# Patient Record
Sex: FEMALE | Race: WHITE | Employment: UNEMPLOYED | URBAN - METROPOLITAN AREA
[De-identification: names, ages, dates, MRNs, and addresses within clinical notes are randomized per-mention and may not be internally consistent; named-entity substitution may affect disease eponyms.]

---

## 2020-08-10 ENCOUNTER — TELEPHONE (OUTPATIENT)
Dept: ONCOLOGY | Age: 55
End: 2020-08-10

## 2020-08-11 ENCOUNTER — TELEPHONE (OUTPATIENT)
Dept: ONCOLOGY | Age: 55
End: 2020-08-11

## 2020-08-11 NOTE — TELEPHONE ENCOUNTER
Name: Mohsen Lock [facility]  : 1965  MRN: [id number]    Oncology Navigation- Initial Note:    Intake-  Contact Type: Telephone  Notes: Alexandra Hartmann [facility] , alerted writer received pt's records,, verified medical insurance, & scheduled Dr. Soy Ramos consult 2020 @ 0830. Spoke with pt updated on appt details & requested pt bring photo id/insurance card/medication list.  Pt verbalized understanding. Discussed potential barriers to care, pt denied any. Discussed community resources, offered referral to [facility], pt agreeable. Secure e-mail sent to [facility], notified of referral.  [facility] financial assistance application completed with pt, e-mailed to [employer]. [facility] written materials, [facility] post card, [facility] brochure, & writer's business card mailed to pt. Will continue to follow.     Electronically signed by Angelica Porter RN on 2020 at 8:41 AM

## 2020-08-14 ENCOUNTER — INITIAL CONSULT (OUTPATIENT)
Dept: ONCOLOGY | Age: 55
End: 2020-08-14
Payer: COMMERCIAL

## 2020-08-14 ENCOUNTER — TELEPHONE (OUTPATIENT)
Dept: ONCOLOGY | Age: 55
End: 2020-08-14

## 2020-08-14 VITALS
SYSTOLIC BLOOD PRESSURE: 139 MMHG | WEIGHT: 187.9 LBS | HEIGHT: 65 IN | HEART RATE: 89 BPM | DIASTOLIC BLOOD PRESSURE: 74 MMHG | TEMPERATURE: 97.7 F | BODY MASS INDEX: 31.31 KG/M2

## 2020-08-14 PROCEDURE — 99245 OFF/OP CONSLTJ NEW/EST HI 55: CPT | Performed by: INTERNAL MEDICINE

## 2020-08-14 PROCEDURE — 99201 HC NEW PT, E/M LEVEL 1: CPT | Performed by: INTERNAL MEDICINE

## 2020-08-14 RX ORDER — METFORMIN HYDROCHLORIDE 500 MG/1
1000 TABLET, EXTENDED RELEASE ORAL 2 TIMES DAILY
COMMUNITY
End: 2020-10-05 | Stop reason: SDUPTHER

## 2020-08-14 RX ORDER — NICOTINE POLACRILEX 4 MG/1
20 GUM, CHEWING ORAL NIGHTLY
COMMUNITY
End: 2020-10-05 | Stop reason: CLARIF

## 2020-08-14 RX ORDER — SERTRALINE HYDROCHLORIDE 100 MG/1
50 TABLET, FILM COATED ORAL DAILY
COMMUNITY
Start: 2020-08-08 | End: 2021-02-05 | Stop reason: SDUPTHER

## 2020-08-14 RX ORDER — ASPIRIN 81 MG/1
81 TABLET, CHEWABLE ORAL NIGHTLY
Status: ON HOLD | COMMUNITY
End: 2021-11-01 | Stop reason: HOSPADM

## 2020-08-14 RX ORDER — SIMVASTATIN 40 MG
40 TABLET ORAL NIGHTLY
COMMUNITY
End: 2021-01-27 | Stop reason: ALTCHOICE

## 2020-08-14 RX ORDER — HYDROCODONE BITARTRATE AND ACETAMINOPHEN 5; 325 MG/1; MG/1
1 TABLET ORAL EVERY 6 HOURS PRN
Qty: 56 TABLET | Refills: 0 | Status: SHIPPED | OUTPATIENT
Start: 2020-08-14 | End: 2020-08-28

## 2020-08-14 NOTE — PROGRESS NOTES
_           Ms. Rogelio Cedeño is a very pleasant 60 y.o. female with history of multiple co morbidities as listed. The patient seen in consultation for ovarian cancer with multiple recurrences. 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 She had surgical debulking and she had systemic treatment with Taxol and carboplatin for 6 cycles. Patient was in remission for about 2 years. She had a relapse in 2007 and treated with topotecan with good results. Patient relapsed again in 2008 and was treated with Taxol carboplatin. After 3 cycles of systemic chemotherapy she had problems with carboplatin so she finished 3 more cycles with Taxol. She did well again for 2 to 3 years until she had a relapse in 2012 and she had intraperitoneal chemotherapy treatment with Taxol. Patient was last seen by her oncologist in 2014 at which time she had relatively stable disease with normal tumor marker and no significant abnormal images. Patient moved to [place] after that and she was lost for oncology follow-ups. Patient was recently evaluated again here in [address] because of abdominal discomfort. Re imaging confirmed metastatic relapse. Biopsy confirmed ovarian cancer recurrence. Scan showed extensive intraabdominal and splenic involvement and lung mets. She has no symptoms at the present time. Patient denies smoking or alcohol drinking.l      PAST MEDICAL HISTORY: has a past medical history of Anemia, Cervical cancer ([facility] [address] Depression, Diabetes mellitus ([address] and GERD (gastroesophageal reflux disease). PAST SURGICAL HISTORY: has a past surgical history that includes Hysterectomy, total abdominal; Port Surgery; and Tonsillectomy.      CURRENT MEDICATIONS:  has a current medication list which includes the following prescription(s): metformin, simvastatin, sertraline, Vital signs: Blood pressure 139/74, pulse 89, temperature 97.7 °F (36.5 °C), temperature source Oral, height 5' 5\" (1.651 m), weight 187 lb 14.4 oz (85.2 kg). General appearance - well appearing, not in pain or distress  Mental status - good mood, alert and oriented  Eyes - pupils equal and reactive, extraocular eye movements intact  Ears - bilateral TM's and external ear canals normal  Nose - normal and patent, no erythema, discharge or polyps  Mouth - mucous membranes moist, pharynx normal without lesions  Neck - supple, no significant adenopathy  Lymphatics - no palpable lymphadenopathy, no hepatosplenomegaly  Chest - clear to auscultation, no wheezes, rales or rhonchi, symmetric air entry  Heart - normal rate, regular rhythm, normal S1, S2, no murmurs, rubs, clicks or gallops  Abdomen - soft, nontender, nondistended, no masses or organomegaly  Neurological - alert, oriented, normal speech, no focal findings or movement disorder noted  Musculoskeletal - no joint tenderness, deformity or swelling  Extremities - peripheral pulses normal, no pedal edema, no clubbing or cyanosis  Skin - normal coloration and turgor, no rashes, no suspicious skin lesions noted     Review of Diagnostic data:   No results found for: WBC, HGB, HCT, MCV, PLT    Chemistry    No results found for: NA, K, CL, CO2, BUN, CREATININE No results found for: CALCIUM, ALKPHOS, AST, ALT, BILITOT         IMPRESSION:   Recurrent ovarian cancer. Original diagnosis 2005 with multiple recurrences. Diffuse metastasis. PLAN: records from outside facilities were reviewed and discussed. For more than 60 minutes of face to face discussion, I explained to the patient the nature of ovarian cancer, staging, prognosis and treatment options. She had multiple recurrences over the last 15 years. We will do PET/CT scan staging evaluation. Discussed palliative treatment. Different regimens were discussed.   We will check tumor for microsatellite instability and will do genetic testing for BRCA1, 2. In the interim we will start treatment with Doxil. Benefits and side effects explained. We will do an Echo. Mediport by IR. Discussed diagnoses, medications, treatment, alternatives, potential side effects with the patient who indicated an understanding.

## 2020-08-14 NOTE — LETTER
[address] [facility] [address]  Phone: [phone]  Fax: [phone]    Alecia Felder MD        August 14, 2020     Patient: Jose Juan Hnuter [facility]   YOB: 1965   Date of Visit: 8/14/2020       To Whom It May Concern:    Ms Marisa Encinas was evaluated in my office and she can go back to work with no restrictions. If you have any questions or concerns, please don't hesitate to call.     Sincerely,        Alecia Felder MD

## 2020-08-14 NOTE — TELEPHONE ENCOUNTER
gia by IR-IR to contact pt for appt-pt informed, understood. Pet/CT scan-scheduled at [facility] on 8/20/2020, arrive at 1030.   Records from Dr Addis To, [facility] with [facility] (medical rec #[phone]), she stated the records will be faxed to [facility] within 24-48 hours once med release is obtained-release form faxed to [phone], confirmation rec'd  Further recommendations will follow.

## 2020-08-14 NOTE — PATIENT INSTRUCTIONS
mediport by IR  Pet/CT scan  Records from Dr Nicol Harp, [facility]   Further recommendations will follow

## 2020-08-17 PROBLEM — C56.9 MALIGNANT NEOPLASM OF OVARY (HCC): Status: ACTIVE | Noted: 2020-08-17

## 2020-08-20 ENCOUNTER — HOSPITAL ENCOUNTER (OUTPATIENT)
Dept: NUCLEAR MEDICINE | Age: 55
Discharge: HOME | End: 2020-08-22
Payer: COMMERCIAL

## 2020-08-20 VITALS — WEIGHT: 187 LBS | BODY MASS INDEX: 31.16 KG/M2 | HEIGHT: 65 IN

## 2020-08-20 LAB — GLUCOSE BLD-MCNC: 139 MG/DL (ref 65–105)

## 2020-08-20 PROCEDURE — 82947 ASSAY GLUCOSE BLOOD QUANT: CPT

## 2020-08-20 PROCEDURE — A9552 F18 FDG: HCPCS | Performed by: INTERNAL MEDICINE

## 2020-08-20 PROCEDURE — 78815 PET IMAGE W/CT SKULL-THIGH: CPT

## 2020-08-20 PROCEDURE — 3430000000 HC RX DIAGNOSTIC RADIOPHARMACEUTICAL: Performed by: INTERNAL MEDICINE

## 2020-08-20 PROCEDURE — 2580000003 HC RX 258: Performed by: INTERNAL MEDICINE

## 2020-08-20 RX ORDER — SODIUM CHLORIDE 0.9 % (FLUSH) 0.9 %
10 SYRINGE (ML) INJECTION PRN
Status: DISCONTINUED | OUTPATIENT
Start: 2020-08-20 | End: 2020-08-23 | Stop reason: HOSPADM

## 2020-08-20 RX ORDER — FLUDEOXYGLUCOSE F 18 200 MCI/ML
10 INJECTION, SOLUTION INTRAVENOUS
Status: COMPLETED | OUTPATIENT
Start: 2020-08-20 | End: 2020-08-20

## 2020-08-20 RX ADMIN — FLUDEOXYGLUCOSE F 18 11.49 MILLICURIE: 200 INJECTION, SOLUTION INTRAVENOUS at 10:58

## 2020-08-20 RX ADMIN — Medication 10 ML: at 10:58

## 2020-08-21 ENCOUNTER — PREP FOR PROCEDURE (OUTPATIENT)
Dept: GENERAL RADIOLOGY | Age: 55
End: 2020-08-21

## 2020-08-21 ENCOUNTER — TELEPHONE (OUTPATIENT)
Dept: ONCOLOGY | Age: 55
End: 2020-08-21

## 2020-08-21 ENCOUNTER — HOSPITAL ENCOUNTER (OUTPATIENT)
Dept: PREADMISSION TESTING | Age: 55
Setting detail: SPECIMEN
Discharge: HOME | End: 2020-08-25
Payer: COMMERCIAL

## 2020-08-21 PROBLEM — Z51.11 ENCOUNTER FOR ANTINEOPLASTIC CHEMOTHERAPY: Status: ACTIVE | Noted: 2020-08-21

## 2020-08-21 PROCEDURE — U0003 INFECTIOUS AGENT DETECTION BY NUCLEIC ACID (DNA OR RNA); SEVERE ACUTE RESPIRATORY SYNDROME CORONAVIRUS 2 (SARS-COV-2) (CORONAVIRUS DISEASE [COVID-19]), AMPLIFIED PROBE TECHNIQUE, MAKING USE OF HIGH THROUGHPUT TECHNOLOGIES AS DESCRIBED BY CMS-2020-01-R: HCPCS

## 2020-08-21 RX ORDER — SODIUM CHLORIDE 0.9 % (FLUSH) 0.9 %
10 SYRINGE (ML) INJECTION PRN
Status: CANCELLED | OUTPATIENT
Start: 2020-09-18

## 2020-08-21 RX ORDER — DIPHENHYDRAMINE HYDROCHLORIDE 50 MG/ML
50 INJECTION INTRAMUSCULAR; INTRAVENOUS ONCE
Status: CANCELLED | OUTPATIENT
Start: 2020-09-18

## 2020-08-21 RX ORDER — SODIUM CHLORIDE 0.9 % (FLUSH) 0.9 %
5 SYRINGE (ML) INJECTION PRN
Status: CANCELLED | OUTPATIENT
Start: 2020-09-18

## 2020-08-21 RX ORDER — SODIUM CHLORIDE 9 MG/ML
INJECTION, SOLUTION INTRAVENOUS CONTINUOUS
Status: CANCELLED | OUTPATIENT
Start: 2020-09-18

## 2020-08-21 RX ORDER — HEPARIN SODIUM (PORCINE) LOCK FLUSH IV SOLN 100 UNIT/ML 100 UNIT/ML
500 SOLUTION INTRAVENOUS PRN
Status: CANCELLED | OUTPATIENT
Start: 2020-09-18

## 2020-08-21 RX ORDER — METHYLPREDNISOLONE SODIUM SUCCINATE 125 MG/2ML
125 INJECTION, POWDER, LYOPHILIZED, FOR SOLUTION INTRAMUSCULAR; INTRAVENOUS ONCE
Status: CANCELLED | OUTPATIENT
Start: 2020-09-18

## 2020-08-21 RX ORDER — SODIUM CHLORIDE 9 MG/ML
INJECTION, SOLUTION INTRAVENOUS CONTINUOUS
Status: CANCELLED | OUTPATIENT
Start: 2020-08-21

## 2020-08-21 RX ORDER — DEXTROSE MONOHYDRATE 50 MG/ML
INJECTION, SOLUTION INTRAVENOUS ONCE
Status: CANCELLED | OUTPATIENT
Start: 2020-09-18

## 2020-08-21 RX ORDER — EPINEPHRINE 1 MG/ML
0.3 INJECTION, SOLUTION, CONCENTRATE INTRAVENOUS PRN
Status: CANCELLED | OUTPATIENT
Start: 2020-09-18

## 2020-08-21 NOTE — TELEPHONE ENCOUNTER
Name: Elvin Devries [facility]  : 1965  MRN: [id number]    Oncology Navigation Follow-Up Note    Contact Type:  Telephone  Notes: 2020 PET/CT scan results obtained, Dr. Yaquelin Pires updated on results. Dr. Yaquelin Pires stated will place orders for 2D echo, doxil, & genetic referral.  Dr. Yaquelin Pires stated already spoke with Malika Monae, [facility] genetic counselor, & alerted to pt's case. Dr. Yaquelin Pires requested writer update pt & may notify PET/CT scan results showing known cancer with two areas in spine with questionable uptake, no further testing r/t pt asymptomatic. Spoke with pt, updated on PET/CT scan results & tx plan. Instructed pt schedulers will call to coordinate 2D echo & once chemotherapy PA approved will schedule Dr. Yaquelin Pires f/u with C1 doxil. Instructed pt will receive call from Malika Monae to coordinate genetic eval/testing. Pt verbalized understanding & thanked writer. Inquired on [address] referrals, pt stated started receiving assistance from both community resources. Instructed pt may contact writer prn. Will continue to follow.     Electronically signed by Tom Vanegas RN on 2020 at 3:12 PM

## 2020-08-22 LAB — SARS-COV-2, NAA: NOT DETECTED

## 2020-08-24 ENCOUNTER — HOSPITAL ENCOUNTER (OUTPATIENT)
Dept: INTERVENTIONAL RADIOLOGY/VASCULAR | Age: 55
Discharge: HOME | End: 2020-08-26
Payer: COMMERCIAL

## 2020-08-24 VITALS
BODY MASS INDEX: 31.16 KG/M2 | HEIGHT: 65 IN | DIASTOLIC BLOOD PRESSURE: 73 MMHG | OXYGEN SATURATION: 97 % | SYSTOLIC BLOOD PRESSURE: 129 MMHG | WEIGHT: 187 LBS | RESPIRATION RATE: 18 BRPM | HEART RATE: 94 BPM | TEMPERATURE: 98.6 F

## 2020-08-24 LAB
INR BLD: 0.9
PARTIAL THROMBOPLASTIN TIME: 23.1 SEC (ref 20.5–30.5)
PLATELET # BLD: 550 K/UL (ref 138–453)
PROTHROMBIN TIME: 9.9 SEC (ref 9–12)

## 2020-08-24 PROCEDURE — 85049 AUTOMATED PLATELET COUNT: CPT

## 2020-08-24 PROCEDURE — 2709999900 HC NON-CHARGEABLE SUPPLY

## 2020-08-24 PROCEDURE — 6360000002 HC RX W HCPCS: Performed by: PHYSICIAN ASSISTANT

## 2020-08-24 PROCEDURE — 85730 THROMBOPLASTIN TIME PARTIAL: CPT

## 2020-08-24 PROCEDURE — 85610 PROTHROMBIN TIME: CPT

## 2020-08-24 PROCEDURE — 6360000002 HC RX W HCPCS: Performed by: RADIOLOGY

## 2020-08-24 PROCEDURE — 2580000003 HC RX 258: Performed by: PHYSICIAN ASSISTANT

## 2020-08-24 PROCEDURE — C1894 INTRO/SHEATH, NON-LASER: HCPCS

## 2020-08-24 PROCEDURE — 36582 REPLACE TUNNELED CV CATH: CPT | Performed by: RADIOLOGY

## 2020-08-24 PROCEDURE — 77001 FLUOROGUIDE FOR VEIN DEVICE: CPT | Performed by: RADIOLOGY

## 2020-08-24 PROCEDURE — 7100000010 HC PHASE II RECOVERY - FIRST 15 MIN

## 2020-08-24 PROCEDURE — C1769 GUIDE WIRE: HCPCS

## 2020-08-24 PROCEDURE — 7100000011 HC PHASE II RECOVERY - ADDTL 15 MIN

## 2020-08-24 RX ORDER — ACETAMINOPHEN 325 MG/1
650 TABLET ORAL EVERY 4 HOURS PRN
Status: DISCONTINUED | OUTPATIENT
Start: 2020-08-24 | End: 2020-08-27 | Stop reason: HOSPADM

## 2020-08-24 RX ORDER — FENTANYL CITRATE 50 UG/ML
INJECTION, SOLUTION INTRAMUSCULAR; INTRAVENOUS
Status: COMPLETED | OUTPATIENT
Start: 2020-08-24 | End: 2020-08-24

## 2020-08-24 RX ORDER — MIDAZOLAM HYDROCHLORIDE 1 MG/ML
INJECTION INTRAMUSCULAR; INTRAVENOUS
Status: COMPLETED | OUTPATIENT
Start: 2020-08-24 | End: 2020-08-24

## 2020-08-24 RX ORDER — SODIUM CHLORIDE 9 MG/ML
INJECTION, SOLUTION INTRAVENOUS CONTINUOUS
Status: DISCONTINUED | OUTPATIENT
Start: 2020-08-24 | End: 2020-08-27 | Stop reason: HOSPADM

## 2020-08-24 RX ORDER — HEPARIN SODIUM (PORCINE) LOCK FLUSH IV SOLN 100 UNIT/ML 100 UNIT/ML
SOLUTION INTRAVENOUS
Status: COMPLETED | OUTPATIENT
Start: 2020-08-24 | End: 2020-08-24

## 2020-08-24 RX ORDER — CEFAZOLIN SODIUM 1 G/50ML
1 INJECTION, SOLUTION INTRAVENOUS ONCE
Status: COMPLETED | OUTPATIENT
Start: 2020-08-24 | End: 2020-08-24

## 2020-08-24 RX ORDER — CEFAZOLIN SODIUM 1 G/50ML
1 INJECTION, SOLUTION INTRAVENOUS
Status: COMPLETED | OUTPATIENT
Start: 2020-08-24 | End: 2020-08-24

## 2020-08-24 RX ADMIN — FENTANYL CITRATE 50 MCG: 50 INJECTION INTRAMUSCULAR; INTRAVENOUS at 14:48

## 2020-08-24 RX ADMIN — SODIUM CHLORIDE, PRESERVATIVE FREE 5 ML: 5 INJECTION INTRAVENOUS at 14:12

## 2020-08-24 RX ADMIN — FENTANYL CITRATE 25 MCG: 50 INJECTION INTRAMUSCULAR; INTRAVENOUS at 14:22

## 2020-08-24 RX ADMIN — MIDAZOLAM HYDROCHLORIDE 0.5 MG: 1 INJECTION, SOLUTION INTRAMUSCULAR; INTRAVENOUS at 13:56

## 2020-08-24 RX ADMIN — FENTANYL CITRATE 50 MCG: 50 INJECTION INTRAMUSCULAR; INTRAVENOUS at 13:35

## 2020-08-24 RX ADMIN — CEFAZOLIN SODIUM 1 G: 1 INJECTION, SOLUTION INTRAVENOUS at 12:38

## 2020-08-24 RX ADMIN — CEFAZOLIN SODIUM 1 G: 1 INJECTION, SOLUTION INTRAVENOUS at 14:04

## 2020-08-24 RX ADMIN — MIDAZOLAM HYDROCHLORIDE 0.5 MG: 1 INJECTION, SOLUTION INTRAMUSCULAR; INTRAVENOUS at 14:23

## 2020-08-24 RX ADMIN — SODIUM CHLORIDE: 9 INJECTION, SOLUTION INTRAVENOUS at 12:38

## 2020-08-24 RX ADMIN — FENTANYL CITRATE 25 MCG: 50 INJECTION INTRAMUSCULAR; INTRAVENOUS at 13:56

## 2020-08-24 ASSESSMENT — PAIN SCALES - GENERAL
PAINLEVEL_OUTOF10: 0

## 2020-08-24 ASSESSMENT — PAIN - FUNCTIONAL ASSESSMENT: PAIN_FUNCTIONAL_ASSESSMENT: 0-10

## 2020-08-24 NOTE — PLAN OF CARE
Right chest port with skin glue noted. Pulses positive, skin pink , warm, and dry. No hematoma or drainage noted.

## 2020-08-24 NOTE — H&P
mellitus ([facility] [address] and GERD (gastroesophageal reflux disease). Past Surgical History   has a past surgical history that includes Hysterectomy, total abdominal; Port Surgery; and Tonsillectomy. Medications   Scheduled Meds:   ceFAZolin  1 g Intravenous On Call to OR     Current Outpatient Rx   Medication Sig Dispense Refill    aspirin 81 MG chewable tablet Take 81 mg by mouth nightly      omeprazole 20 MG EC tablet Take 20 mg by mouth nightly      metFORMIN (GLUCOPHAGE-XR) 500 MG extended release tablet Take 1,000 mg by mouth 2 times daily      simvastatin (ZOCOR) 40 MG tablet Take 40 mg by mouth nightly      sertraline (ZOLOFT) 100 MG tablet Take 50 mg by mouth daily       HYDROcodone-acetaminophen (NORCO) 5-325 MG per tablet Take 1 tablet by mouth every 6 hours as needed for Pain for up to 14 days. 56 tablet 0     Continuous Infusions:   sodium chloride       PRN Meds:. Allergies  has No Known Allergies. Family History    family history is not on file.     Family Status   Relation Name Status    Mother      Father           Social History  Social History     Socioeconomic History    Marital status: Single     Spouse name: Not on file    Number of children: Not on file    Years of education: Not on file    Highest education level: Not on file   Occupational History    Not on file   Social Needs    Financial resource strain: Not on file    Food insecurity     Worry: Not on file     Inability: Not on file    Transportation needs     Medical: Not on file     Non-medical: Not on file   Tobacco Use    Smoking status: Current Every Day Smoker     Packs/day: 1.00    Smokeless tobacco: Current User   Substance and Sexual Activity    Alcohol use: Not Currently    Drug use: Never    Sexual activity: Not on file   Lifestyle    Physical activity     Days per week: Not on file     Minutes per session: Not on file    Stress: Not on file   Relationships    Social connections Talks on phone: Not on file     Gets together: Not on file     Attends Tenriism service: Not on file     Active member of club or organization: Not on file     Attends meetings of clubs or organizations: Not on file     Relationship status: Not on file    Intimate partner violence     Fear of current or ex partner: Not on file     Emotionally abused: Not on file     Physically abused: Not on file     Forced sexual activity: Not on file   Other Topics Concern    Not on file   Social History Narrative    Not on file                 Hobbies:  Watch TV ,     OBJECTIVE:   VITALS:  vitals were not taken for this visit. CONSTITUTIONAL: Alert and oriented times 3, no acute distress and cooperative to examination. friendly and pleasant     SKIN: rash No    HEENT: Head is normocephalic, atraumatic. EOMI, PERRLA    Oral air way :slightly narrow Yes    NECK: neck supple, no lymphadenopathy noted, trachea midline and straight       2+ carotid, no bruit    LUNGS: Chest expands equally bilaterally upon respiration, no accessory muscle used. Ausculation reveals no adventitious breath sounds. CARDIOVASCULAR: \"Heart sounds are normal.  Regular rate and rhythm without murmur,    ABDOMEN: Bowel sounds are present in all four quadrants  , over weight     GENATALIA:Deferred. NEUROLOGIC: \"CN II-XII are grossly intact. EXTREMITIES: Pitting edema:  No,  Varicose veins: Yes     Dorsal pedal/posterior tibial pulses palpable: Yes         Strength:    Not tested      Patient Active Problem List   Diagnosis    Malignant neoplasm of ovary ([address]    Encounter for antineoplastic chemotherapy               IMPRESSIONS:   1.  ovarian cancer with multiple recurrences  2. does not have any pertinent problems on file.     [facility]  Electronically signed 8/24/2020 at 12:24 PM       Scheduled for:   Port placement    For chemo

## 2020-08-24 NOTE — BRIEF OP NOTE
Brief Postoperative Note for [facility]  YOB: 1965  [id number]    Pre-operative Diagnosis: Ovarian ca      Post-operative Diagnosis: Same    Procedure: Annika Ket 8 Fr Port Placement    Medication Given: fentanyl and midazolam    Anesthesia: 1 %Lidocaine, 1% Lidocaine w/ Epi    Surgeons/Assistants: Mirian Osman MD    Estimated Blood Loss: Minimal    Complications:retained cath fragment. Patient declined attempt for removal at this time. Previous port removed. New Xcela 8 Fr Port placement into the right IJ. Port flushed with heparin. Incision site closed with Vicryl sutures. Dressing applied. Vital signs were reviewed and were stable after the procedure. Patient tolerated the procedure well.     Electronically signed by Dev Gomez PA-C on 8/24/2020 at 3:05 PM

## 2020-08-24 NOTE — H&P (VIEW-ONLY)
History and Physical    Pt Name: Link Ponce  MRN: [id number]  YOB: 1965  Date of evaluation: 8/24/2020  Primary Care Physician: No primary care provider on file. Patient evaluated at the request of  Dr. Ritchie Lambert    Reason for evaluation:  ovarian cancer with multiple recurrences  SUBJECTIVE:   History of Chief Complaint:   According to oncology note in Aug/2020    Brent Santos is a 60 y.o. female,       Ms. Madhav Fagan is a very pleasant 60 y.o. female with history of multiple co morbidities as listed. The patient seen in consultation for ovarian cancer with multiple recurrences. 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 She had surgical debulking and she had systemic treatment with Taxol and carboplatin for 6 cycles. Patient was in remission for about 2 years. She had a relapse in 2007 and treated with topotecan with good results. Patient relapsed again in 2008 and was treated with Taxol carboplatin. After 3 cycles of systemic chemotherapy she had problems with carboplatin so she finished 3 more cycles with Taxol. She did well again for 2 to 3 years until she had a relapse in 2012 and she had intraperitoneal chemotherapy treatment with Taxol. Patient was last seen by her oncologist in 2014 at which time she had relatively stable disease with normal tumor marker and no significant abnormal images. Patient moved to [place] after that and she was lost for oncology follow-ups. Patient was recently evaluated again here in [facility] because of abdominal discomfort. Re imaging confirmed metastatic relapse. Biopsy confirmed ovarian cancer recurrence. Scan showed extensive intraabdominal and splenic involvement and lung mets. She has no symptoms at the present time.    Patient denies smoking or alcohol drinking.l          Past Medical History      has a past medical history of Anemia, Cervical cancer ([address] Depression, Diabetes Talks on phone: Not on file     Gets together: Not on file     Attends Episcopal service: Not on file     Active member of club or organization: Not on file     Attends meetings of clubs or organizations: Not on file     Relationship status: Not on file    Intimate partner violence     Fear of current or ex partner: Not on file     Emotionally abused: Not on file     Physically abused: Not on file     Forced sexual activity: Not on file   Other Topics Concern    Not on file   Social History Narrative    Not on file                 Hobbies:  Watch TV ,     OBJECTIVE:   VITALS:  vitals were not taken for this visit. CONSTITUTIONAL: Alert and oriented times 3, no acute distress and cooperative to examination. friendly and pleasant     SKIN: rash No    HEENT: Head is normocephalic, atraumatic. EOMI, PERRLA    Oral air way :slightly narrow Yes    NECK: neck supple, no lymphadenopathy noted, trachea midline and straight       2+ carotid, no bruit    LUNGS: Chest expands equally bilaterally upon respiration, no accessory muscle used. Ausculation reveals no adventitious breath sounds. CARDIOVASCULAR: \"Heart sounds are normal.  Regular rate and rhythm without murmur,    ABDOMEN: Bowel sounds are present in all four quadrants  , over weight     GENATALIA:Deferred. NEUROLOGIC: \"CN II-XII are grossly intact. EXTREMITIES: Pitting edema:  No,  Varicose veins: Yes     Dorsal pedal/posterior tibial pulses palpable: Yes         Strength:    Not tested      Patient Active Problem List   Diagnosis    Malignant neoplasm of ovary ([address]    Encounter for antineoplastic chemotherapy               IMPRESSIONS:   1.  ovarian cancer with multiple recurrences  2. does not have any pertinent problems on file.     [facility]  Electronically signed 8/24/2020 at 12:24 PM       Scheduled for:   Port placement    For chemo

## 2020-08-24 NOTE — POST SEDATION
Sedation Post Procedure Note    Patient Name: Yaniv Fleming   YOB: 1965  Room/Bed: Room/bed info not found  Medical Record Number: [id number]  Date: 8/24/2020   Time: 3:05 PM         Physicians/Assistants: Brittney Lamar MD    Procedure Performed:  Port    Post-Sedation Vital Signs:  Vitals:    08/24/20 1451   BP: (!) 141/65   Pulse: 74   Resp: 15   SpO2: 95%      Vital signs were reviewed and were stable after the procedure (see flow sheet for vitals)            Post-Sedation Exam: Pt remains stable           Complications: none    Electronically signed by JOANN Jones on 8/24/2020 at 3:05 PM

## 2020-08-24 NOTE — PRE SEDATION
Sedation Pre-Procedure Note    Patient Name: Jerry Gomez [facility]   YOB: 1965  Room/Bed: Room/bed info not found  Medical Record Number: [id number]  Date: 8/24/2020   Time: 1:28 PM       Indication:  Port    Consent: I have discussed with the patient and/or the patient representative the indication, alternatives, and the possible risks and/or complications of the planned procedure and the anesthesia methods. The patient and/or patient representative appear to understand and agree to proceed. Vital Signs:   Vitals:    08/24/20 1225   BP: 115/62   Pulse: 79   Resp: 18   SpO2: 96%       Past Medical History:   has a past medical history of Anemia, Cervical cancer ([facility] [address] Depression, Diabetes mellitus ([facility] [address] and GERD (gastroesophageal reflux disease). Past Surgical History:   has a past surgical history that includes Hysterectomy, total abdominal; Port Surgery; and Tonsillectomy. Medications:   Scheduled Meds:   Continuous Infusions:    sodium chloride 20 mL/hr at 08/24/20 1238     PRN Meds:   Home Meds:   Prior to Admission medications    Medication Sig Start Date End Date Taking? Authorizing Provider   aspirin 81 MG chewable tablet Take 81 mg by mouth nightly   Yes Historical Provider, MD   metFORMIN (GLUCOPHAGE-XR) 500 MG extended release tablet Take 1,000 mg by mouth 2 times daily   Yes Historical Provider, MD   simvastatin (ZOCOR) 40 MG tablet Take 40 mg by mouth nightly   Yes Historical Provider, MD   sertraline (ZOLOFT) 100 MG tablet Take 50 mg by mouth daily  8/8/20  Yes Historical Provider, MD   HYDROcodone-acetaminophen (NORCO) 5-325 MG per tablet Take 1 tablet by mouth every 6 hours as needed for Pain for up to 14 days.  8/14/20 8/28/20 Yes Nataly Hughes MD   omeprazole 20 MG EC tablet Take 20 mg by mouth nightly    Historical Provider, MD     Coumadin Use Last 7 Days:  no  Antiplatelet drug therapy use last 7 days: no  Other anticoagulant use last 7 days: no  Additional Medication Information:  See [facility]      Pre-Sedation Documentation and Exam:   I have personally completed a history, physical exam & review of systems for this patient (see notes).     Mallampati Airway Assessment:  Mallampati Class II - (soft palate, fauces & uvula are visible)    Prior History of Anesthesia Complications:   none    ASA Classification:  Class 2 - A normal healthy patient with mild systemic disease    Sedation/ Anesthesia Plan:   intravenous sedation    Medications Planned:   midazolam (Versed) intravenously and fentanyl intravenously    Patient is an appropriate candidate for plan of sedation: yes    Electronically signed by JOANN Bell on 8/24/2020 at 1:28 PM

## 2020-08-25 ENCOUNTER — TELEPHONE (OUTPATIENT)
Dept: ONCOLOGY | Age: 55
End: 2020-08-25

## 2020-08-25 NOTE — TELEPHONE ENCOUNTER
Name: Michael Song [facility]  : 1965  MRN: [id number]    Oncology Navigation Follow-Up Note    Contact Type:  Telephone  Notes: Jerry Sánchez, [facility] IR , alerted writer pt completed port removal & new port insertion yesterday. Jerry Sánchez stated piece of old port in vein, pt declined to stay for removal, scheduled 2020 for removal, & Dr. Biswas Citizen spoke with Dr. Nahun Forrest yesterday. Will continue to follow.     Electronically signed by Brady Nugent RN on 2020 at 1:36 PM

## 2020-08-28 ENCOUNTER — HOSPITAL ENCOUNTER (OUTPATIENT)
Dept: INTERVENTIONAL RADIOLOGY/VASCULAR | Age: 55
Discharge: HOME | End: 2020-08-30
Payer: COMMERCIAL

## 2020-08-28 VITALS
HEART RATE: 78 BPM | DIASTOLIC BLOOD PRESSURE: 62 MMHG | WEIGHT: 187 LBS | BODY MASS INDEX: 31.16 KG/M2 | TEMPERATURE: 97.6 F | SYSTOLIC BLOOD PRESSURE: 123 MMHG | OXYGEN SATURATION: 96 % | RESPIRATION RATE: 14 BRPM | HEIGHT: 65 IN

## 2020-08-28 PROCEDURE — C1894 INTRO/SHEATH, NON-LASER: HCPCS

## 2020-08-28 PROCEDURE — 76937 US GUIDE VASCULAR ACCESS: CPT

## 2020-08-28 PROCEDURE — 7100000010 HC PHASE II RECOVERY - FIRST 15 MIN

## 2020-08-28 PROCEDURE — 7100000011 HC PHASE II RECOVERY - ADDTL 15 MIN

## 2020-08-28 PROCEDURE — 6360000002 HC RX W HCPCS: Performed by: RADIOLOGY

## 2020-08-28 PROCEDURE — 36012 PLACE CATHETER IN VEIN: CPT | Performed by: RADIOLOGY

## 2020-08-28 PROCEDURE — C1773 RET DEV, INSERTABLE: HCPCS

## 2020-08-28 PROCEDURE — C1725 CATH, TRANSLUMIN NON-LASER: HCPCS

## 2020-08-28 PROCEDURE — 75827 VEIN X-RAY CHEST: CPT | Performed by: RADIOLOGY

## 2020-08-28 PROCEDURE — C1769 GUIDE WIRE: HCPCS

## 2020-08-28 PROCEDURE — 2709999900 HC NON-CHARGEABLE SUPPLY

## 2020-08-28 PROCEDURE — 37197 REMOVE INTRVAS FOREIGN BODY: CPT | Performed by: RADIOLOGY

## 2020-08-28 RX ORDER — FENTANYL CITRATE 50 UG/ML
INJECTION, SOLUTION INTRAMUSCULAR; INTRAVENOUS
Status: COMPLETED | OUTPATIENT
Start: 2020-08-28 | End: 2020-08-28

## 2020-08-28 RX ORDER — SODIUM CHLORIDE 9 MG/ML
INJECTION, SOLUTION INTRAVENOUS CONTINUOUS
Status: DISCONTINUED | OUTPATIENT
Start: 2020-08-28 | End: 2020-08-31 | Stop reason: HOSPADM

## 2020-08-28 RX ORDER — ACETAMINOPHEN 325 MG/1
650 TABLET ORAL EVERY 4 HOURS PRN
Status: DISCONTINUED | OUTPATIENT
Start: 2020-08-28 | End: 2020-08-31 | Stop reason: HOSPADM

## 2020-08-28 RX ADMIN — FENTANYL CITRATE 50 MCG: 50 INJECTION INTRAMUSCULAR; INTRAVENOUS at 08:50

## 2020-08-28 ASSESSMENT — PAIN SCALES - GENERAL
PAINLEVEL_OUTOF10: 3
PAINLEVEL_OUTOF10: 3
PAINLEVEL_OUTOF10: 6
PAINLEVEL_OUTOF10: 3

## 2020-08-28 ASSESSMENT — PAIN DESCRIPTION - LOCATION
LOCATION: BACK

## 2020-08-28 ASSESSMENT — PAIN DESCRIPTION - PAIN TYPE
TYPE: ACUTE PAIN
TYPE: CHRONIC PAIN
TYPE: CHRONIC PAIN

## 2020-08-28 ASSESSMENT — PAIN - FUNCTIONAL ASSESSMENT: PAIN_FUNCTIONAL_ASSESSMENT: 0-10

## 2020-08-28 NOTE — PROGRESS NOTES
Dime size amount red blood noted to right groin dressing; no hematoma or discoloration noted; right dp and pt pulse palpable.

## 2020-08-28 NOTE — PRE SEDATION
Sedation Pre-Procedure Note    Patient Name: Richard Monzon [facility]   YOB: 1965  Room/Bed: Room/bed info not found  Medical Record Number: [id number]  Date: 8/28/2020   Time: 9:12 AM       Indication:  Foreign body retrieval     Consent: I have discussed with the patient and/or the patient representative the indication, alternatives, and the possible risks and/or complications of the planned procedure and the anesthesia methods. The patient and/or patient representative appear to understand and agree to proceed. Vital Signs:   Vitals:    08/28/20 0905   BP: 139/83   Pulse: 73   Resp: 16   Temp:    SpO2: 99%       Past Medical History:   has a past medical history of Anemia, Cervical cancer ([facility] [address] Depression, Diabetes mellitus ([facility] [address] and GERD (gastroesophageal reflux disease). Past Surgical History:   has a past surgical history that includes Hysterectomy, total abdominal; Port Surgery; Tonsillectomy; and IR PORT PLACEMENT > 5 YEARS (8/24/2020). Medications:   Scheduled Meds:   Continuous Infusions:   PRN Meds:   Home Meds:   Prior to Admission medications    Medication Sig Start Date End Date Taking? Authorizing Provider   omeprazole 20 MG EC tablet Take 20 mg by mouth nightly   Yes Historical Provider, MD   metFORMIN (GLUCOPHAGE-XR) 500 MG extended release tablet Take 1,000 mg by mouth 2 times daily   Yes Historical Provider, MD   simvastatin (ZOCOR) 40 MG tablet Take 40 mg by mouth nightly   Yes Historical Provider, MD   sertraline (ZOLOFT) 100 MG tablet Take 50 mg by mouth daily  8/8/20  Yes Historical Provider, MD   HYDROcodone-acetaminophen (NORCO) 5-325 MG per tablet Take 1 tablet by mouth every 6 hours as needed for Pain for up to 14 days.  8/14/20 8/28/20 Yes Corrinne Mulders, MD   aspirin 81 MG chewable tablet Take 81 mg by mouth nightly    Historical Provider, MD     Coumadin Use Last 7 Days:  no  Antiplatelet drug therapy use last 7 days: no  Other anticoagulant use last 7 days: no  Additional Medication Information:  See [facility] rec      Pre-Sedation Documentation and Exam:   I have personally completed a history, physical exam & review of systems for this patient (see notes).     Mallampati Airway Assessment:  Mallampati Class II - (soft palate, fauces & uvula are visible)    Prior History of Anesthesia Complications:   none    ASA Classification:  Class 2 - A normal healthy patient with mild systemic disease    Sedation/ Anesthesia Plan:   intravenous sedation    Medications Planned:   midazolam (Versed) intravenously and fentanyl intravenously    Patient is an appropriate candidate for plan of sedation: yes    Electronically signed by JOANN Cortez on 8/28/2020 at 9:12 AM

## 2020-08-28 NOTE — BRIEF OP NOTE
Brief Postoperative Note    [facility]  YOB: 1965  [id number]    Pre-operative Diagnosis: Foreign body right IJ    Post-operative Diagnosis: Same    Procedure: Foreign body retrieval     Anesthesia: Local and Moderate Sedation    Surgeons/Assistants: Sue Tamayo MD    Estimated Blood Loss: less than 50     Complications: None    Specimens: Was Obtained:     Findings: Successful removal of right IJ foreign body. Approximately a 9 cm piece of catheter was removed in entirety from the right IJ. Pt tolerated well.     Electronically signed by JOANN Carlton on 8/28/2020 at 9:13 AM

## 2020-08-28 NOTE — POST SEDATION
Sedation Post Procedure Note    Patient Name: Maryana Guzman   YOB: 1965  Room/Bed: Room/bed info not found  Medical Record Number: [id number]  Date: 8/28/2020   Time: 9:13 AM         Physicians/Assistants:  Radha Beatty MD    Procedure Performed:  Foreign body retrieval     Post-Sedation Vital Signs:  Vitals:    08/28/20 0905   BP: 139/83   Pulse: 73   Resp: 16   Temp:    SpO2: 99%      Vital signs were reviewed and were stable after the procedure (see flow sheet for vitals)            Post-Sedation Exam: Pt remains stable           Complications: none    Electronically signed by JOANN Montgomery on 8/28/2020 at 9:13 AM

## 2020-08-31 ENCOUNTER — HOSPITAL ENCOUNTER (OUTPATIENT)
Dept: NON INVASIVE DIAGNOSTICS | Age: 55
Discharge: HOME | End: 2020-09-02
Payer: COMMERCIAL

## 2020-08-31 LAB
LV EF: 63 %
LVEF MODALITY: NORMAL

## 2020-08-31 PROCEDURE — 93306 TTE W/DOPPLER COMPLETE: CPT

## 2020-09-01 ENCOUNTER — TELEPHONE (OUTPATIENT)
Dept: ONCOLOGY | Age: 55
End: 2020-09-01

## 2020-09-01 NOTE — TELEPHONE ENCOUNTER
Name: Elvin Devries [facility]  : 1965  MRN: [id number]    Oncology Navigation Follow-Up Note    Contact Type:  Telephone  Notes: Upon review of Epic noted pt completed port exchange & 2D echo. Noted chemo PA pending. Archetypes message sent to [facility] , updated on pt, inquired on pending PA, & requested pre cert contacted. Will continue to follow.       Electronically signed by Tom Vanegas RN on 2020 at 8:21 AM

## 2020-09-03 ENCOUNTER — TELEPHONE (OUTPATIENT)
Dept: ONCOLOGY | Age: 55
End: 2020-09-03

## 2020-09-03 NOTE — TELEPHONE ENCOUNTER
Name: Charlie Moya [facility]  : 1965  MRN: [id number]    Oncology Navigation Follow-Up Note    Contact Type:  Telephone  Notes: Pt left VM inquiring on chemotherapy schedule. Spoke with Imer Brown, [facility] infusion , updated on VM & inquired on chemotherapy PA. Imer Brown stated PA pending & will contact [facility] pre cert now. Spoke with pt updated on conversation with Imer Brown. Will continue to follow.     Electronically signed by Luly Barriga RN on 9/3/2020 at 10:38 AM

## 2020-09-09 ENCOUNTER — TELEPHONE (OUTPATIENT)
Dept: ONCOLOGY | Age: 55
End: 2020-09-09

## 2020-09-09 RX ORDER — HEPARIN SODIUM (PORCINE) LOCK FLUSH IV SOLN 100 UNIT/ML 100 UNIT/ML
500 SOLUTION INTRAVENOUS PRN
Status: CANCELLED | OUTPATIENT
Start: 2020-11-13

## 2020-09-09 RX ORDER — DIPHENHYDRAMINE HYDROCHLORIDE 50 MG/ML
50 INJECTION INTRAMUSCULAR; INTRAVENOUS ONCE
Status: CANCELLED | OUTPATIENT
Start: 2020-11-13

## 2020-09-09 RX ORDER — SODIUM CHLORIDE 0.9 % (FLUSH) 0.9 %
5 SYRINGE (ML) INJECTION PRN
Status: CANCELLED | OUTPATIENT
Start: 2020-11-13

## 2020-09-09 RX ORDER — DEXTROSE MONOHYDRATE 50 MG/ML
INJECTION, SOLUTION INTRAVENOUS ONCE
Status: CANCELLED | OUTPATIENT
Start: 2020-11-13

## 2020-09-09 RX ORDER — EPINEPHRINE 1 MG/ML
0.3 INJECTION, SOLUTION, CONCENTRATE INTRAVENOUS PRN
Status: CANCELLED | OUTPATIENT
Start: 2020-11-13

## 2020-09-09 RX ORDER — SODIUM CHLORIDE 9 MG/ML
INJECTION, SOLUTION INTRAVENOUS CONTINUOUS
Status: CANCELLED | OUTPATIENT
Start: 2020-11-13

## 2020-09-09 RX ORDER — SODIUM CHLORIDE 0.9 % (FLUSH) 0.9 %
10 SYRINGE (ML) INJECTION PRN
Status: CANCELLED | OUTPATIENT
Start: 2020-11-13

## 2020-09-09 RX ORDER — METHYLPREDNISOLONE SODIUM SUCCINATE 125 MG/2ML
125 INJECTION, POWDER, LYOPHILIZED, FOR SOLUTION INTRAMUSCULAR; INTRAVENOUS ONCE
Status: CANCELLED | OUTPATIENT
Start: 2020-11-13

## 2020-09-09 NOTE — TELEPHONE ENCOUNTER
Fax received from Sreekanth Carmona, pre-cert, stating patient's Doxil is denied and requires using in combination with Avastin in members who haven't previously received Avastin. Writer informed Dr Arabella Choi of above to see if he wanted to perform an expedited appeal. Dr Arabella Choi stated he will place orders for Avastin with Doxil. Writer informed Sreekanth Carmona, pre-cert, of above and Sreekanth Carmona is to resubmit orders once Avastin is added.  Austyn Lloyd

## 2020-09-10 ENCOUNTER — TELEPHONE (OUTPATIENT)
Dept: ONCOLOGY | Age: 55
End: 2020-09-10

## 2020-09-10 NOTE — TELEPHONE ENCOUNTER
Name: Hortensia Sanchez [facility]  : 1965  MRN: [id number]    Oncology Navigation Follow-Up Note    Contact Type:  Telephone  Notes: Pt left  inquiring on chemotherapy start. Upon review of Epic chemotherapy PA denied r/t requires using doxil in combination w/Avastin, Dr. Shanda Molina updated on denial, may appeal, or may add avastin. Per documentation Dr. Shanda Molina stated will place orders for avastin. Pt updated on findings. Pt verbalized frustrations with insurance company. Support given & instructed pt writer will follow closely. Pt verbalized understanding & thanked writer. Will continue to follow.     Electronically signed by Jazlyn Medina RN on 9/10/2020 at 10:49 AM

## 2020-09-11 ENCOUNTER — TELEPHONE (OUTPATIENT)
Dept: ONCOLOGY | Age: 55
End: 2020-09-11

## 2020-09-14 ENCOUNTER — TELEPHONE (OUTPATIENT)
Dept: ONCOLOGY | Age: 55
End: 2020-09-14

## 2020-09-15 ENCOUNTER — TELEPHONE (OUTPATIENT)
Dept: ONCOLOGY | Age: 55
End: 2020-09-15

## 2020-09-15 NOTE — TELEPHONE ENCOUNTER
Zayda Leslie, pre-cert, stating tx is still pending and stated she will give us an update in the morning. Writer instructed Zayda Leslie to assure tx is marked as URGENT. Shay Florentino, nurse navigator, informed of above.  Dexter Baez

## 2020-09-15 NOTE — TELEPHONE ENCOUNTER
Name: Michaela Ramirez [facility]  : 1965  MRN: [id number]    Oncology Navigation Follow-Up Note    Contact Type:  Telephone  Notes: Spoke with pt notified chemotherapy PA pending, more information submitted to insurance company urgently, & hopeful for decision by end of day. Pt verbalized understanding. Will continue to follow.     Electronically signed by Dandre Munoz RN on 9/15/2020 at 3:17 PM

## 2020-09-16 ENCOUNTER — TELEPHONE (OUTPATIENT)
Dept: INFUSION THERAPY | Age: 55
End: 2020-09-16

## 2020-09-16 ENCOUNTER — TELEPHONE (OUTPATIENT)
Dept: ONCOLOGY | Age: 55
End: 2020-09-16

## 2020-09-16 NOTE — TELEPHONE ENCOUNTER
Late entry. At the end of the day on 9/15/20. Call received from Allen GRADY, stating that Doxil and Avastin need a peer review and stated to call [phone] to perform an expedited appeal with physician reviewer with episode of coverage ID [id number]. YSABEL also stated that updated information, with fax cover sheet including patient's name, , address, [employer] ID, Dr Arvizu Eastern name, NPI, both drugs along with dosages/method and duration of administration, copy of front and back of insurance card and diagnosis codes. Above information faxed to [phone] and marked URGENT. Confirmation fax received and information placed in pile to be scanned.  Aníbal Domingo

## 2020-09-16 NOTE — TELEPHONE ENCOUNTER
Chemotherapy orders received:    Ht=65 inches  Gn=597 lbs  BSA=1.97  Chemotherapy doses verified:  Avastin 15mg/kg = 1260 mg  Doxil 40 mg/m2 = 78 mg  Papi Rosas RN

## 2020-09-16 NOTE — TELEPHONE ENCOUNTER
Name: Tanisha Menendez [facility]  : 1965  MRN: [id number]    Oncology Navigation Follow-Up Note    Contact Type:  Telephone  Notes: Shari [address] [place] [address] triage nurse, alerted writer chemotherapy PA approved. Spoke with pt updated chemotherapy approved & will receive call from [facility] , with appt times for chemotherapy along with Dr. Loretta Julio f/u on 2020. Instructed pt may contact writer prn. Vostu message sent to [place] updated writer notified chemotherapy PA approved & conversation with pt. Will continue to follow.     Electronically signed by Tracy Deluca RN on 2020 at 8:46 AM

## 2020-09-16 NOTE — TELEPHONE ENCOUNTER
Chemo orders received, ht 65\", wt 187lbs, and bsa 1.97 verified. Dose of chemotherapy verified;   Avastin 15mg/kg= 1272mg  Doxil 40mg/m2= 78mg

## 2020-09-16 NOTE — TELEPHONE ENCOUNTER
Sarah Carbone, pre-cert, sent skype message stating's patient's chemo tx is approved. Writer informed Renee Corbin, nurse navigator, of above. [facility] to call patient to inform her of above and Alivia Tai, , to call patient to schedule f/u appt with Dr Zee Kenney this Friday and C1 tx following appt this Friday. Writer informed [facility], infusion charge RN, of above.  Vel Crenshaw

## 2020-09-18 ENCOUNTER — TELEPHONE (OUTPATIENT)
Dept: ONCOLOGY | Age: 55
End: 2020-09-18

## 2020-09-18 ENCOUNTER — HOSPITAL ENCOUNTER (OUTPATIENT)
Dept: INFUSION THERAPY | Age: 55
Discharge: HOME | End: 2020-09-18
Payer: COMMERCIAL

## 2020-09-18 ENCOUNTER — OFFICE VISIT (OUTPATIENT)
Dept: ONCOLOGY | Age: 55
End: 2020-09-18
Payer: COMMERCIAL

## 2020-09-18 VITALS
SYSTOLIC BLOOD PRESSURE: 103 MMHG | TEMPERATURE: 99 F | HEART RATE: 112 BPM | BODY MASS INDEX: 30.85 KG/M2 | WEIGHT: 185.4 LBS | DIASTOLIC BLOOD PRESSURE: 68 MMHG | RESPIRATION RATE: 18 BRPM

## 2020-09-18 DIAGNOSIS — C56.9 MALIGNANT NEOPLASM OF OVARY, UNSPECIFIED LATERALITY (HCC): Primary | ICD-10-CM

## 2020-09-18 LAB
ABSOLUTE EOS #: 0.11 K/UL (ref 0–0.4)
ABSOLUTE IMMATURE GRANULOCYTE: ABNORMAL K/UL (ref 0–0.3)
ABSOLUTE LYMPH #: 0.97 K/UL (ref 1–4.8)
ABSOLUTE MONO #: 0.54 K/UL (ref 0.1–0.8)
ALBUMIN SERPL-MCNC: 4.3 G/DL (ref 3.5–5.2)
ALBUMIN/GLOBULIN RATIO: 1.4 (ref 1–2.5)
ALP BLD-CCNC: 83 U/L (ref 35–104)
ALT SERPL-CCNC: 6 U/L (ref 5–33)
ANION GAP SERPL CALCULATED.3IONS-SCNC: 17 MMOL/L (ref 9–17)
AST SERPL-CCNC: 9 U/L
BASOPHILS # BLD: 0 % (ref 0–2)
BASOPHILS ABSOLUTE: 0 K/UL (ref 0–0.2)
BILIRUB SERPL-MCNC: 0.16 MG/DL (ref 0.3–1.2)
BUN BLDV-MCNC: 11 MG/DL (ref 6–20)
BUN/CREAT BLD: ABNORMAL (ref 9–20)
CA 125: 295 U/ML
CALCIUM SERPL-MCNC: 9.6 MG/DL (ref 8.6–10.4)
CHLORIDE BLD-SCNC: 101 MMOL/L (ref 98–107)
CO2: 21 MMOL/L (ref 20–31)
CREAT SERPL-MCNC: 0.52 MG/DL (ref 0.5–0.9)
DIFFERENTIAL TYPE: ABNORMAL
EOSINOPHILS RELATIVE PERCENT: 1 % (ref 1–4)
GFR AFRICAN AMERICAN: >60 ML/MIN
GFR NON-AFRICAN AMERICAN: >60 ML/MIN
GFR SERPL CREATININE-BSD FRML MDRD: ABNORMAL ML/MIN/{1.73_M2}
GFR SERPL CREATININE-BSD FRML MDRD: ABNORMAL ML/MIN/{1.73_M2}
GLUCOSE BLD-MCNC: 179 MG/DL (ref 70–99)
HCT VFR BLD CALC: 28.8 % (ref 36–46)
HEMOGLOBIN: 9 G/DL (ref 12–16)
IMMATURE GRANULOCYTES: ABNORMAL %
LYMPHOCYTES # BLD: 9 % (ref 24–44)
MCH RBC QN AUTO: 24.4 PG (ref 26–34)
MCHC RBC AUTO-ENTMCNC: 31.2 G/DL (ref 31–37)
MCV RBC AUTO: 78.1 FL (ref 80–100)
MONOCYTES # BLD: 5 % (ref 1–7)
MORPHOLOGY: ABNORMAL
MORPHOLOGY: ABNORMAL
NRBC AUTOMATED: ABNORMAL PER 100 WBC
PDW BLD-RTO: 23.5 % (ref 12.5–15.4)
PLATELET # BLD: 591 K/UL (ref 140–450)
PLATELET ESTIMATE: ABNORMAL
PMV BLD AUTO: 6.7 FL (ref 6–12)
POTASSIUM SERPL-SCNC: 4.2 MMOL/L (ref 3.7–5.3)
PROTEIN UA: NEGATIVE
RBC # BLD: 3.69 M/UL (ref 4–5.2)
RBC # BLD: ABNORMAL 10*6/UL
SEG NEUTROPHILS: 85 % (ref 36–66)
SEGMENTED NEUTROPHILS ABSOLUTE COUNT: 9.18 K/UL (ref 1.8–7.7)
SODIUM BLD-SCNC: 139 MMOL/L (ref 135–144)
TOTAL PROTEIN: 7.3 G/DL (ref 6.4–8.3)
WBC # BLD: 10.8 K/UL (ref 3.5–11)
WBC # BLD: ABNORMAL 10*3/UL

## 2020-09-18 PROCEDURE — 36591 DRAW BLOOD OFF VENOUS DEVICE: CPT

## 2020-09-18 PROCEDURE — 99211 OFF/OP EST MAY X REQ PHY/QHP: CPT | Performed by: INTERNAL MEDICINE

## 2020-09-18 PROCEDURE — 96417 CHEMO IV INFUS EACH ADDL SEQ: CPT

## 2020-09-18 PROCEDURE — 85025 COMPLETE CBC W/AUTO DIFF WBC: CPT

## 2020-09-18 PROCEDURE — 99214 OFFICE O/P EST MOD 30 MIN: CPT | Performed by: INTERNAL MEDICINE

## 2020-09-18 PROCEDURE — 96415 CHEMO IV INFUSION ADDL HR: CPT

## 2020-09-18 PROCEDURE — 6360000002 HC RX W HCPCS: Performed by: INTERNAL MEDICINE

## 2020-09-18 PROCEDURE — 96375 TX/PRO/DX INJ NEW DRUG ADDON: CPT

## 2020-09-18 PROCEDURE — 86304 IMMUNOASSAY TUMOR CA 125: CPT

## 2020-09-18 PROCEDURE — 81003 URINALYSIS AUTO W/O SCOPE: CPT

## 2020-09-18 PROCEDURE — 2580000003 HC RX 258: Performed by: INTERNAL MEDICINE

## 2020-09-18 PROCEDURE — 96413 CHEMO IV INFUSION 1 HR: CPT

## 2020-09-18 PROCEDURE — 80053 COMPREHEN METABOLIC PANEL: CPT

## 2020-09-18 RX ORDER — SODIUM CHLORIDE 0.9 % (FLUSH) 0.9 %
10 SYRINGE (ML) INJECTION PRN
Status: DISCONTINUED | OUTPATIENT
Start: 2020-09-18 | End: 2020-09-19 | Stop reason: HOSPADM

## 2020-09-18 RX ORDER — HEPARIN SODIUM (PORCINE) LOCK FLUSH IV SOLN 100 UNIT/ML 100 UNIT/ML
500 SOLUTION INTRAVENOUS PRN
Status: DISCONTINUED | OUTPATIENT
Start: 2020-09-18 | End: 2020-09-19 | Stop reason: HOSPADM

## 2020-09-18 RX ORDER — DEXAMETHASONE SODIUM PHOSPHATE 10 MG/ML
8 INJECTION INTRAMUSCULAR; INTRAVENOUS ONCE
Status: COMPLETED | OUTPATIENT
Start: 2020-09-18 | End: 2020-09-18

## 2020-09-18 RX ORDER — DEXTROSE MONOHYDRATE 50 MG/ML
INJECTION, SOLUTION INTRAVENOUS ONCE
Status: COMPLETED | OUTPATIENT
Start: 2020-09-18 | End: 2020-09-18

## 2020-09-18 RX ORDER — FERROUS SULFATE 325(65) MG
325 TABLET ORAL
Qty: 60 TABLET | Refills: 5 | Status: SHIPPED | OUTPATIENT
Start: 2020-09-18 | End: 2021-01-27 | Stop reason: ALTCHOICE

## 2020-09-18 RX ADMIN — Medication 8 MG: at 10:11

## 2020-09-18 RX ADMIN — BEVACIZUMAB 1300 MG: 400 INJECTION, SOLUTION INTRAVENOUS at 10:55

## 2020-09-18 RX ADMIN — Medication 10 ML: at 13:43

## 2020-09-18 RX ADMIN — DOXORUBICIN HYDROCHLORIDE 78 MG: 2 INJECTABLE, LIPOSOMAL INTRAVENOUS at 12:30

## 2020-09-18 RX ADMIN — DEXTROSE MONOHYDRATE: 50 INJECTION, SOLUTION INTRAVENOUS at 10:10

## 2020-09-18 RX ADMIN — HEPARIN 500 UNITS: 100 SYRINGE at 13:43

## 2020-09-18 RX ADMIN — Medication 10 ML: at 10:10

## 2020-09-18 NOTE — FLOWSHEET NOTE
Situation:  Writer encountered Ms. Garcia and Son, Will, who is also an oncology patient, in the waiting area of the medical oncology clinic. Later, writer briefly visited with Patient and Son in the infusion clinic, upon learning from the nurse that this is Patient's first treatment. Assessment:  Ms. Clearnce Jeans and Son were in the waiting area of the medical oncology clinic. Son, whom writer has met, greeted writer and introduced Patient, who smiled and acknowledged writer. Son acknowledged the support of his mother and gave thanks. Later in the infusion clinic, Pt and Son were together in the treatment cubicle. They were eating lunch. Pt was looking at her lap top. She acknowledged writer and reported that she was doing well. She responded briefly to writer's questions and smiled. Son smiled but did not engage in conversation. Intervention:  Writer provided supportive presence; affirmed Pt and Son; introduced herself and informed Pt that she is available or support; offered words of encouragement and support. Outcome:  Ms. Clearnce Jeans and Son acknowledged writer's words of support and expressed thanks. 09/18/20 1207   Encounter Summary   Services provided to: Patient and family together   Referral/Consult From: Rounding;Nurse   Support System Children   Continue Visiting   (9/18/20)   Complexity of Encounter Low   Length of Encounter 15 minutes   Routine   Type Initial   Assessment Calm; Approachable   Intervention Provided reading materials/devotional materials;Sustaining presence/ Ministry of presence   Outcome Coping;Expressed gratitude     Electronically signed by Anjali Hoff, Oncology Outpatient [facility], [address] [facility]  9/18/2020  12:08 PM

## 2020-09-18 NOTE — TELEPHONE ENCOUNTER
Pt was seen today by Dr. Millie Edge. Per AVS, pt is scheduled for f/u on 10-16-20 with tx to follow.  will be added on to lab work per [facility] in lab. Schedule to be given to pt by Guerita Rodriguez spoke to RN.

## 2020-09-18 NOTE — PROGRESS NOTES
Patient here for C1D1 Avastin Doxil. Labs reviewed. [facility] information given consent signed. She saw Dr. Navdeep Kuo today see his dictation. She tolerated treatment well and was discharged home with son in stable condition. She is due to return 10/16 for C2D1.

## 2020-09-20 PROBLEM — Z51.11 ENCOUNTER FOR ANTINEOPLASTIC CHEMOTHERAPY: Status: RESOLVED | Noted: 2020-08-21 | Resolved: 2020-09-20

## 2020-09-27 NOTE — PROGRESS NOTES
CP. No new complaints. She is to start palliative chemotherapy using Doxil/ Avastin. PAST MEDICAL HISTORY: has a past medical history of Anemia, Cervical cancer ([facility] [address] Depression, Diabetes mellitus ([facility] and GERD (gastroesophageal reflux disease). PAST SURGICAL HISTORY: has a past surgical history that includes Hysterectomy, total abdominal; Port Surgery; Tonsillectomy; and IR PORT PLACEMENT > 5 YEARS (8/24/2020). CURRENT MEDICATIONS:  has a current medication list which includes the following prescription(s): ferrous sulfate, aspirin, omeprazole, metformin, simvastatin, and sertraline. ALLERGIES:  has No Known Allergies. FAMILY HISTORY: Negative for any hematological or oncological conditions. SOCIAL HISTORY:  reports that she has been smoking. She has been smoking about 1.00 pack per day. She uses smokeless tobacco. She reports previous alcohol use. She reports that she does not use drugs. REVIEW OF SYSTEMS:     · General: No weakness or fatigue. No unanticipated weight loss or decreased appetite. No fever or chills. · Eyes: No blurred vision, eye pain or double vision. · Ears: No hearing problems or drainage. No tinnitus. · Throat: No sore throat, problems with swallowing or dysphagia. · Respiratory: No cough, sputum or hemoptysis. No shortness of breath. No pleuritic chest pain. · Cardiovascular: No chest pain, orthopnea or PND. No lower extremity edema. No palpitation. · Gastrointestinal: No problems with swallowing. No abdominal pain or bloating. No nausea or vomiting. No diarrhea or constipation. No GI bleeding. · Genitourinary: No dysuria, hematuria, frequency or urgency. · Musculoskeletal: No muscle aches or pains. No limitation of movement. No back pain. No gait disturbance, No joint complaints. · Dermatologic: No skin rashes or pruritus. No skin lesions or discolorations. · Psychiatric: No depression, anxiety, or stress or signs of schizophrenia.  No change in mood or affect. · Hematologic: No history of bleeding tendency. No bruises or ecchymosis. No history of clotting problems. · Infectious disease: No fever, chills or frequent infections. · Endocrine: No polydipsia or polyuria. No temperature intolerance. · Neurologic: No headaches or dizziness. No weakness or numbness of the extremities. No changes in balance, coordination,  memory, mentation, behavior. · Allergic/Immunologic: No nasal congestion or hives. No repeated infections. PHYSICAL EXAM:  The patient is not in acute distress. Vital signs: Blood pressure 103/68, pulse 112, temperature 99 °F (37.2 °C), temperature source Oral, resp. rate 18, weight 185 lb 6.4 oz (84.1 kg).      General appearance - well appearing, not in pain or distress  Mental status - good mood, alert and oriented  Eyes - pupils equal and reactive, extraocular eye movements intact  Ears - bilateral TM's and external ear canals normal  Nose - normal and patent, no erythema, discharge or polyps  Mouth - mucous membranes moist, pharynx normal without lesions  Neck - supple, no significant adenopathy  Lymphatics - no palpable lymphadenopathy, no hepatosplenomegaly  Chest - clear to auscultation, no wheezes, rales or rhonchi, symmetric air entry  Heart - normal rate, regular rhythm, normal S1, S2, no murmurs, rubs, clicks or gallops  Abdomen - soft, nontender, nondistended, no masses or organomegaly  Neurological - alert, oriented, normal speech, no focal findings or movement disorder noted  Musculoskeletal - no joint tenderness, deformity or swelling  Extremities - peripheral pulses normal, no pedal edema, no clubbing or cyanosis  Skin - normal coloration and turgor, no rashes, no suspicious skin lesions noted     Review of Diagnostic data:   Lab Results   Component Value Date    WBC 10.8 09/18/2020    HGB 9.0 (L) 09/18/2020    HCT 28.8 (L) 09/18/2020    MCV 78.1 (L) 09/18/2020     (H) 09/18/2020       Chemistry Component Value Date/Time     09/18/2020 0845    K 4.2 09/18/2020 0845     09/18/2020 0845    CO2 21 09/18/2020 0845    BUN 11 09/18/2020 0845    CREATININE 0.52 09/18/2020 0845        Component Value Date/Time    CALCIUM 9.6 09/18/2020 0845    ALKPHOS 83 09/18/2020 0845    AST 9 09/18/2020 0845    ALT 6 09/18/2020 0845    BILITOT 0.16 (L) 09/18/2020 0845            IMPRESSION:   Recurrent ovarian cancer. Original diagnosis 2005 with multiple recurrences. Diffuse metastasis. PLAN: records from outside facilities were reviewed and discussed. I explained to the patient the nature of ovarian cancer, staging, prognosis and treatment options. She had multiple recurrences over the last 15 years. We explained results of PET/CT scan staging evaluation. Discussed palliative treatment. Different regimens were discussed. We will start Doxil / Avastin. I explained the benefits and possible side effects related to chemotherapy, which may include but not limited to nausea, vomiting, hair loss, mouth sores, allergy, neuropathy, fever infection sepsis, anemia and thrombocytopenia. Monitor toxicity and response to treatment  Patient's questions were answered to the best of her satisfaction and she verbalized full understanding and agreement.

## 2020-09-29 ENCOUNTER — TELEPHONE (OUTPATIENT)
Dept: ONCOLOGY | Age: 55
End: 2020-09-29

## 2020-09-29 NOTE — TELEPHONE ENCOUNTER
Call received from patient's son, Christa Florence, stating that the patient fell this morning and was gray/pale/dusky in color. Christa Florence stated that the patient could not move and was puking up bright red blood. Christa Florence called 911 and the EMS transferred patient to [facility] stated he requested patient to go to a different hospital; however, EMS stated she had to go to [facility]. Writer informed Dr Flor Shaver of above.  Rosario Leiva

## 2020-09-29 NOTE — TELEPHONE ENCOUNTER
Call received from patient's son requesting records to be sent to [facility]. Writer called [facility] and spoke with Rosalio Flowers RN. Casandra informed of patient's history, last chemo tx details, recent lab results, etc. Progress note, chemo flow sheet, PET scan results and lab results faxed to [facility] at [phone]. Confirmation fax received.  Robert Zarco

## 2020-09-30 NOTE — TELEPHONE ENCOUNTER
Call received from [facility], [place] hem onc NP, stating that she is rounding on patient. [facility] stated that she wants to talk to Dr Kaycee Sosa about having a discussion with patient regarding referring her to Hospice. [facility] stated that the patient has a mass on her spleen and stated patient had an EGD that confirmed she has gastric varices. [facility] stated that the patient has received \"quite a few\" blood transfusions and stated that the patient's HGB is not increasing after the blood transfusions. Writer provided [facility] with Dr Salinas's cell phone number and instructed her to call him prior to having a Hospice discussion with patient. Writer called Dr Kaycee Sosa and informed him of above. Dr Kaycee Sosa stated that the patient is not Hospice appropriate and stated he will discuss this with [facility].  Marly Brink

## 2020-09-30 NOTE — TELEPHONE ENCOUNTER
Call received from patient's son, Bryson Figueroa, requesting Dr Bill Beckford to call him on his cell phone at [phone]. Writer informed Dr Bill Beckford of above.  Sofia Cargo

## 2020-10-02 RX ORDER — ALPRAZOLAM 0.5 MG/1
0.5 TABLET ORAL NIGHTLY PRN
Qty: 5 TABLET | Refills: 0 | Status: SHIPPED | OUTPATIENT
Start: 2020-10-02 | End: 2020-10-07

## 2020-10-05 ENCOUNTER — HOSPITAL ENCOUNTER (OUTPATIENT)
Age: 55
Discharge: HOME | End: 2020-10-05
Payer: COMMERCIAL

## 2020-10-05 ENCOUNTER — TELEPHONE (OUTPATIENT)
Dept: ONCOLOGY | Age: 55
End: 2020-10-05

## 2020-10-05 ENCOUNTER — OFFICE VISIT (OUTPATIENT)
Dept: ONCOLOGY | Age: 55
End: 2020-10-05
Payer: COMMERCIAL

## 2020-10-05 VITALS
HEART RATE: 103 BPM | BODY MASS INDEX: 31.27 KG/M2 | TEMPERATURE: 98.7 F | WEIGHT: 187.9 LBS | RESPIRATION RATE: 18 BRPM | DIASTOLIC BLOOD PRESSURE: 80 MMHG | SYSTOLIC BLOOD PRESSURE: 133 MMHG

## 2020-10-05 DIAGNOSIS — C56.9 MALIGNANT NEOPLASM OF OVARY, UNSPECIFIED LATERALITY (HCC): ICD-10-CM

## 2020-10-05 LAB
ABSOLUTE EOS #: 0.16 K/UL (ref 0–0.4)
ABSOLUTE IMMATURE GRANULOCYTE: ABNORMAL K/UL (ref 0–0.3)
ABSOLUTE LYMPH #: 1.22 K/UL (ref 1–4.8)
ABSOLUTE MONO #: 0.57 K/UL (ref 0.1–0.8)
ALBUMIN SERPL-MCNC: 4.2 G/DL (ref 3.5–5.2)
ALBUMIN/GLOBULIN RATIO: 1.6 (ref 1–2.5)
ALP BLD-CCNC: 95 U/L (ref 35–104)
ALT SERPL-CCNC: 6 U/L (ref 5–33)
ANION GAP SERPL CALCULATED.3IONS-SCNC: 13 MMOL/L (ref 9–17)
AST SERPL-CCNC: 10 U/L
BASOPHILS # BLD: 0 % (ref 0–2)
BASOPHILS ABSOLUTE: 0 K/UL (ref 0–0.2)
BILIRUB SERPL-MCNC: 0.15 MG/DL (ref 0.3–1.2)
BUN BLDV-MCNC: 10 MG/DL (ref 6–20)
BUN/CREAT BLD: ABNORMAL (ref 9–20)
CA 125: 223 U/ML
CALCIUM SERPL-MCNC: 9.6 MG/DL (ref 8.6–10.4)
CHLORIDE BLD-SCNC: 102 MMOL/L (ref 98–107)
CO2: 25 MMOL/L (ref 20–31)
CREAT SERPL-MCNC: 0.49 MG/DL (ref 0.5–0.9)
DIFFERENTIAL TYPE: ABNORMAL
EOSINOPHILS RELATIVE PERCENT: 2 % (ref 1–4)
GFR AFRICAN AMERICAN: >60 ML/MIN
GFR NON-AFRICAN AMERICAN: >60 ML/MIN
GFR SERPL CREATININE-BSD FRML MDRD: ABNORMAL ML/MIN/{1.73_M2}
GFR SERPL CREATININE-BSD FRML MDRD: ABNORMAL ML/MIN/{1.73_M2}
GLUCOSE BLD-MCNC: 176 MG/DL (ref 70–99)
HCT VFR BLD CALC: 31.7 % (ref 36–46)
HEMOGLOBIN: 9.8 G/DL (ref 12–16)
IMMATURE GRANULOCYTES: ABNORMAL %
LYMPHOCYTES # BLD: 15 % (ref 24–44)
MCH RBC QN AUTO: 27.9 PG (ref 26–34)
MCHC RBC AUTO-ENTMCNC: 30.9 G/DL (ref 31–37)
MCV RBC AUTO: 90.5 FL (ref 80–100)
MONOCYTES # BLD: 7 % (ref 1–7)
MORPHOLOGY: ABNORMAL
MORPHOLOGY: ABNORMAL
NRBC AUTOMATED: ABNORMAL PER 100 WBC
PDW BLD-RTO: 23.2 % (ref 12.5–15.4)
PLATELET # BLD: 422 K/UL (ref 140–450)
PLATELET ESTIMATE: ABNORMAL
PMV BLD AUTO: 8 FL (ref 6–12)
POTASSIUM SERPL-SCNC: 4.5 MMOL/L (ref 3.7–5.3)
RBC # BLD: 3.51 M/UL (ref 4–5.2)
RBC # BLD: ABNORMAL 10*6/UL
SEG NEUTROPHILS: 76 % (ref 36–66)
SEGMENTED NEUTROPHILS ABSOLUTE COUNT: 6.15 K/UL (ref 1.8–7.7)
SODIUM BLD-SCNC: 140 MMOL/L (ref 135–144)
TOTAL PROTEIN: 6.8 G/DL (ref 6.4–8.3)
WBC # BLD: 8.1 K/UL (ref 3.5–11)
WBC # BLD: ABNORMAL 10*3/UL

## 2020-10-05 PROCEDURE — 85025 COMPLETE CBC W/AUTO DIFF WBC: CPT

## 2020-10-05 PROCEDURE — 99211 OFF/OP EST MAY X REQ PHY/QHP: CPT | Performed by: INTERNAL MEDICINE

## 2020-10-05 PROCEDURE — 36415 COLL VENOUS BLD VENIPUNCTURE: CPT

## 2020-10-05 PROCEDURE — 99214 OFFICE O/P EST MOD 30 MIN: CPT | Performed by: INTERNAL MEDICINE

## 2020-10-05 PROCEDURE — 86304 IMMUNOASSAY TUMOR CA 125: CPT

## 2020-10-05 PROCEDURE — 80053 COMPREHEN METABOLIC PANEL: CPT

## 2020-10-05 RX ORDER — METFORMIN HYDROCHLORIDE 500 MG/1
1000 TABLET, EXTENDED RELEASE ORAL 2 TIMES DAILY
Qty: 30 TABLET | Refills: 2 | Status: SHIPPED | OUTPATIENT
Start: 2020-10-05 | End: 2021-01-04

## 2020-10-05 RX ORDER — PANTOPRAZOLE SODIUM 40 MG/1
40 TABLET, DELAYED RELEASE ORAL 2 TIMES DAILY
COMMUNITY
End: 2021-04-08 | Stop reason: SDUPTHER

## 2020-10-05 NOTE — TELEPHONE ENCOUNTER
Call received from patient stating she was recently d/c from [facility] and stated she went back to ER over the weekend. Patient stated ER wanted repeat CBC drawn today. Writer spoke with Dr Terry Pringle and he stated he wanted to see patient today for hospital f/u and lab work. Dr Terry Pringle stated he wants a cbc, cmp ca 125. Patient added to schedule today and stated she wants her labs drawn peripherally from her arm. Heather Rivera, informed of above.  Monica Bermudez

## 2020-10-05 NOTE — TELEPHONE ENCOUNTER
AVS from 10/5/20     RV 10/16/2020, MD visit and Doxil treatment     RV already scheduled for 10/16/20 @ 9:30am with chemo to follow    Pt was given AVS and appt schedule

## 2020-10-06 ENCOUNTER — TELEPHONE (OUTPATIENT)
Dept: ONCOLOGY | Age: 55
End: 2020-10-06

## 2020-10-06 NOTE — TELEPHONE ENCOUNTER
Name: Napoleon Rodriguez [facility]  : 1965  MRN: [id number]    Oncology Navigation Follow-Up Note    Contact Type:  Telephone  Notes: Spoke with pt for f/u call. Pt stated scheduled for [facility] tomorrow & will be admitted after procedure. Pt denied questions/concerns/issues. Instructed pt may contact writer prn. Will continue to follow.     Electronically signed by Sabina Chaudhry RN on 10/6/2020 at 11:46 AM

## 2020-10-06 NOTE — PROGRESS NOTES
_      Chief Complaint   Patient presents with    Follow-up     review status of disease    Other     feels off, but thinks from changes in medications    Numbness     feet     DIAGNOSIS:       Recurrent ovarian cancer. Original diagnosis 2005 with multiple recurrences. Diffuse metastasis. CURRENT THERAPY:         Doxil/ Avastin started 9/18/2020. Avastin will be continued due to recent GI bleeding. BRIEF CASE HISTORY:      Ms. Dinah Berumen is a very pleasant 60 y.o. female with history of multiple co morbidities as listed. The patient seen in consultation for ovarian cancer with multiple recurrences. 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 She had surgical debulking and she had systemic treatment with Taxol and carboplatin for 6 cycles. Patient was in remission for about 2 years. She had a relapse in 2007 and treated with topotecan with good results. Patient relapsed again in 2008 and was treated with Taxol carboplatin. After 3 cycles of systemic chemotherapy she had problems with carboplatin so she finished 3 more cycles with Taxol. She did well again for 2 to 3 years until she had a relapse in 2012 and she had intraperitoneal chemotherapy treatment with Taxol. Patient was last seen by her oncologist in 2014 at which time she had relatively stable disease with normal tumor marker and no significant abnormal images. Patient moved to [place] after that and she was lost for oncology follow-ups. Patient was recently evaluated again here in [facility] [place] because of abdominal discomfort. Re imaging confirmed metastatic relapse. Biopsy confirmed ovarian cancer recurrence. Scan showed extensive intraabdominal and splenic involvement and lung mets. She has no symptoms at the present time.    Patient denies smoking or alcohol drinking.l    INTERIM HISTORY: patient is seen for follow up recurrent ovarian cancer. Patient received 1 cycle of palliative chemotherapy using Doxil/ Avastin. Patient was hospitalized at [facility] last week because of severe GI bleeding. She had multiple blood transfusions. She had GI and vascular evaluation. She had evidence of intra-abdominal mass at the splenic area with GI infiltration. Plan for the patient to have embolization by vascular. Currently she feels better. No clear active bleeding. No melena or hematochezia. No hematemesis. PAST MEDICAL HISTORY: has a past medical history of Anemia, Cervical cancer ([facility] [address] Depression, Diabetes mellitus ([facility] [address] and GERD (gastroesophageal reflux disease). PAST SURGICAL HISTORY: has a past surgical history that includes Hysterectomy, total abdominal; Port Surgery; Tonsillectomy; and IR PORT PLACEMENT > 5 YEARS (8/24/2020). CURRENT MEDICATIONS:  has a current medication list which includes the following prescription(s): pantoprazole, metformin, alprazolam, ferrous sulfate, sertraline, aspirin, and simvastatin. ALLERGIES:  has No Known Allergies. FAMILY HISTORY: Negative for any hematological or oncological conditions. SOCIAL HISTORY:  reports that she has been smoking. She has been smoking about 1.00 pack per day. She uses smokeless tobacco. She reports previous alcohol use. She reports that she does not use drugs. REVIEW OF SYSTEMS:     · General: No weakness or fatigue. No unanticipated weight loss or decreased appetite. No fever or chills. · Eyes: No blurred vision, eye pain or double vision. · Ears: No hearing problems or drainage. No tinnitus. · Throat: No sore throat, problems with swallowing or dysphagia. · Respiratory: No cough, sputum or hemoptysis. No shortness of breath. No pleuritic chest pain. · Cardiovascular: No chest pain, orthopnea or PND. No lower extremity edema. No palpitation. · Gastrointestinal: As above. · Genitourinary: No dysuria, hematuria, frequency or urgency. · Musculoskeletal: No muscle aches or pains. No limitation of movement. No back pain. No gait disturbance, No joint complaints. · Dermatologic: No skin rashes or pruritus. No skin lesions or discolorations. · Psychiatric: No depression, anxiety, or stress or signs of schizophrenia. No change in mood or affect. · Hematologic: No history of bleeding tendency. No bruises or ecchymosis. No history of clotting problems. · Infectious disease: No fever, chills or frequent infections. · Endocrine: No polydipsia or polyuria. No temperature intolerance. · Neurologic: No headaches or dizziness. No weakness or numbness of the extremities. No changes in balance, coordination,  memory, mentation, behavior. · Allergic/Immunologic: No nasal congestion or hives. No repeated infections. PHYSICAL EXAM:  The patient is not in acute distress. Vital signs: Blood pressure 133/80, pulse 103, temperature 98.7 °F (37.1 °C), temperature source Oral, resp. rate 18, weight 187 lb 14.4 oz (85.2 kg).      General appearance - well appearing, not in pain or distress  Mental status - good mood, alert and oriented  Eyes - pupils equal and reactive, extraocular eye movements intact  Ears - bilateral TM's and external ear canals normal  Nose - normal and patent, no erythema, discharge or polyps  Mouth - mucous membranes moist, pharynx normal without lesions  Neck - supple, no significant adenopathy  Lymphatics - no palpable lymphadenopathy, no hepatosplenomegaly  Chest - clear to auscultation, no wheezes, rales or rhonchi, symmetric air entry  Heart - normal rate, regular rhythm, normal S1, S2, no murmurs, rubs, clicks or gallops  Abdomen - soft, nontender, nondistended, no masses or organomegaly  Neurological - alert, oriented, normal speech, no focal findings or movement disorder noted  Musculoskeletal - no joint tenderness, deformity or swelling  Extremities -

## 2020-10-16 ENCOUNTER — TELEPHONE (OUTPATIENT)
Dept: ONCOLOGY | Age: 55
End: 2020-10-16

## 2020-10-16 ENCOUNTER — OFFICE VISIT (OUTPATIENT)
Dept: ONCOLOGY | Age: 55
End: 2020-10-16
Payer: COMMERCIAL

## 2020-10-16 ENCOUNTER — HOSPITAL ENCOUNTER (OUTPATIENT)
Dept: INFUSION THERAPY | Age: 55
Discharge: HOME | End: 2020-10-16
Payer: COMMERCIAL

## 2020-10-16 VITALS
DIASTOLIC BLOOD PRESSURE: 87 MMHG | HEART RATE: 116 BPM | TEMPERATURE: 98.1 F | WEIGHT: 181.4 LBS | RESPIRATION RATE: 18 BRPM | SYSTOLIC BLOOD PRESSURE: 150 MMHG | BODY MASS INDEX: 30.19 KG/M2

## 2020-10-16 DIAGNOSIS — C56.9 MALIGNANT NEOPLASM OF OVARY, UNSPECIFIED LATERALITY (HCC): Primary | ICD-10-CM

## 2020-10-16 LAB
ABSOLUTE EOS #: 0.15 K/UL (ref 0–0.4)
ABSOLUTE IMMATURE GRANULOCYTE: ABNORMAL K/UL (ref 0–0.3)
ABSOLUTE LYMPH #: 1.18 K/UL (ref 1–4.8)
ABSOLUTE MONO #: 1.62 K/UL (ref 0.1–1.2)
ALBUMIN SERPL-MCNC: 3.4 G/DL (ref 3.5–5.2)
ALBUMIN/GLOBULIN RATIO: 0.9 (ref 1–2.5)
ALP BLD-CCNC: 272 U/L (ref 35–104)
ALT SERPL-CCNC: 11 U/L (ref 5–33)
ANION GAP SERPL CALCULATED.3IONS-SCNC: 15 MMOL/L (ref 9–17)
AST SERPL-CCNC: 8 U/L
BASOPHILS # BLD: 1 % (ref 0–2)
BASOPHILS ABSOLUTE: 0.15 K/UL (ref 0–0.2)
BILIRUB SERPL-MCNC: 0.14 MG/DL (ref 0.3–1.2)
BUN BLDV-MCNC: 6 MG/DL (ref 6–20)
BUN/CREAT BLD: ABNORMAL (ref 9–20)
CALCIUM SERPL-MCNC: 9.4 MG/DL (ref 8.6–10.4)
CHLORIDE BLD-SCNC: 101 MMOL/L (ref 98–107)
CO2: 24 MMOL/L (ref 20–31)
CREAT SERPL-MCNC: 0.48 MG/DL (ref 0.5–0.9)
DIFFERENTIAL TYPE: ABNORMAL
EOSINOPHILS RELATIVE PERCENT: 1 % (ref 1–4)
GFR AFRICAN AMERICAN: >60 ML/MIN
GFR NON-AFRICAN AMERICAN: >60 ML/MIN
GFR SERPL CREATININE-BSD FRML MDRD: ABNORMAL ML/MIN/{1.73_M2}
GFR SERPL CREATININE-BSD FRML MDRD: ABNORMAL ML/MIN/{1.73_M2}
GLUCOSE BLD-MCNC: 169 MG/DL (ref 70–99)
HCT VFR BLD CALC: 32.1 % (ref 36–46)
HEMOGLOBIN: 10.2 G/DL (ref 12–16)
IMMATURE GRANULOCYTES: ABNORMAL %
LYMPHOCYTES # BLD: 8 % (ref 24–44)
MCH RBC QN AUTO: 26.5 PG (ref 26–34)
MCHC RBC AUTO-ENTMCNC: 31.7 G/DL (ref 31–37)
MCV RBC AUTO: 83.6 FL (ref 80–100)
MONOCYTES # BLD: 11 % (ref 2–11)
MORPHOLOGY: ABNORMAL
NRBC AUTOMATED: ABNORMAL PER 100 WBC
PDW BLD-RTO: 22.9 % (ref 12.5–15.4)
PLATELET # BLD: 1384 K/UL (ref 140–450)
PLATELET ESTIMATE: ABNORMAL
PMV BLD AUTO: 7.1 FL (ref 6–12)
POTASSIUM SERPL-SCNC: 3.8 MMOL/L (ref 3.7–5.3)
RBC # BLD: 3.85 M/UL (ref 4–5.2)
RBC # BLD: ABNORMAL 10*6/UL
SEG NEUTROPHILS: 79 % (ref 36–66)
SEGMENTED NEUTROPHILS ABSOLUTE COUNT: 11.6 K/UL (ref 1.8–7.7)
SODIUM BLD-SCNC: 140 MMOL/L (ref 135–144)
TOTAL PROTEIN: 7.2 G/DL (ref 6.4–8.3)
WBC # BLD: 14.7 K/UL (ref 3.5–11)
WBC # BLD: ABNORMAL 10*3/UL

## 2020-10-16 PROCEDURE — 80053 COMPREHEN METABOLIC PANEL: CPT

## 2020-10-16 PROCEDURE — 99211 OFF/OP EST MAY X REQ PHY/QHP: CPT | Performed by: INTERNAL MEDICINE

## 2020-10-16 PROCEDURE — 6360000002 HC RX W HCPCS: Performed by: INTERNAL MEDICINE

## 2020-10-16 PROCEDURE — 99214 OFFICE O/P EST MOD 30 MIN: CPT | Performed by: INTERNAL MEDICINE

## 2020-10-16 PROCEDURE — 2580000003 HC RX 258: Performed by: INTERNAL MEDICINE

## 2020-10-16 PROCEDURE — 36591 DRAW BLOOD OFF VENOUS DEVICE: CPT

## 2020-10-16 PROCEDURE — 85025 COMPLETE CBC W/AUTO DIFF WBC: CPT

## 2020-10-16 RX ORDER — DIPHENHYDRAMINE HYDROCHLORIDE 50 MG/ML
50 INJECTION INTRAMUSCULAR; INTRAVENOUS ONCE
Status: CANCELLED | OUTPATIENT
Start: 2020-12-11

## 2020-10-16 RX ORDER — SODIUM CHLORIDE 0.9 % (FLUSH) 0.9 %
10 SYRINGE (ML) INJECTION PRN
Status: CANCELLED | OUTPATIENT
Start: 2020-10-16

## 2020-10-16 RX ORDER — SODIUM CHLORIDE 9 MG/ML
INJECTION, SOLUTION INTRAVENOUS CONTINUOUS
Status: CANCELLED | OUTPATIENT
Start: 2020-12-11

## 2020-10-16 RX ORDER — SODIUM CHLORIDE 0.9 % (FLUSH) 0.9 %
20 SYRINGE (ML) INJECTION PRN
Status: DISCONTINUED | OUTPATIENT
Start: 2020-10-16 | End: 2020-10-17 | Stop reason: HOSPADM

## 2020-10-16 RX ORDER — SODIUM CHLORIDE 0.9 % (FLUSH) 0.9 %
5 SYRINGE (ML) INJECTION PRN
Status: CANCELLED | OUTPATIENT
Start: 2020-12-11

## 2020-10-16 RX ORDER — HEPARIN SODIUM (PORCINE) LOCK FLUSH IV SOLN 100 UNIT/ML 100 UNIT/ML
500 SOLUTION INTRAVENOUS PRN
Status: DISCONTINUED | OUTPATIENT
Start: 2020-10-16 | End: 2020-10-17 | Stop reason: HOSPADM

## 2020-10-16 RX ORDER — SODIUM CHLORIDE 0.9 % (FLUSH) 0.9 %
20 SYRINGE (ML) INJECTION PRN
Status: CANCELLED | OUTPATIENT
Start: 2020-10-16

## 2020-10-16 RX ORDER — DEXTROSE MONOHYDRATE 50 MG/ML
INJECTION, SOLUTION INTRAVENOUS ONCE
Status: CANCELLED | OUTPATIENT
Start: 2020-12-11

## 2020-10-16 RX ORDER — SODIUM CHLORIDE 0.9 % (FLUSH) 0.9 %
10 SYRINGE (ML) INJECTION PRN
Status: CANCELLED | OUTPATIENT
Start: 2020-12-11

## 2020-10-16 RX ORDER — METHYLPREDNISOLONE SODIUM SUCCINATE 125 MG/2ML
125 INJECTION, POWDER, LYOPHILIZED, FOR SOLUTION INTRAMUSCULAR; INTRAVENOUS ONCE
Status: CANCELLED | OUTPATIENT
Start: 2020-12-11

## 2020-10-16 RX ORDER — HEPARIN SODIUM (PORCINE) LOCK FLUSH IV SOLN 100 UNIT/ML 100 UNIT/ML
500 SOLUTION INTRAVENOUS PRN
Status: CANCELLED | OUTPATIENT
Start: 2020-10-16

## 2020-10-16 RX ORDER — HEPARIN SODIUM (PORCINE) LOCK FLUSH IV SOLN 100 UNIT/ML 100 UNIT/ML
500 SOLUTION INTRAVENOUS PRN
Status: CANCELLED | OUTPATIENT
Start: 2020-12-11

## 2020-10-16 RX ORDER — OXYCODONE AND ACETAMINOPHEN 10; 325 MG/1; MG/1
1-2 TABLET ORAL EVERY 4 HOURS
COMMUNITY
Start: 2020-10-12 | End: 2020-12-30 | Stop reason: SDUPTHER

## 2020-10-16 RX ORDER — EPINEPHRINE 1 MG/ML
0.3 INJECTION, SOLUTION, CONCENTRATE INTRAVENOUS PRN
Status: CANCELLED | OUTPATIENT
Start: 2020-12-11

## 2020-10-16 RX ADMIN — HEPARIN 500 UNITS: 100 SYRINGE at 11:45

## 2020-10-16 RX ADMIN — SODIUM CHLORIDE, PRESERVATIVE FREE 20 ML: 5 INJECTION INTRAVENOUS at 11:45

## 2020-10-16 RX ADMIN — SODIUM CHLORIDE, PRESERVATIVE FREE 20 ML: 5 INJECTION INTRAVENOUS at 09:55

## 2020-10-16 NOTE — PROGRESS NOTES
Patient to be seen today 10/16/20 during treatment. Patient's treatment held and r/s for next Friday 10/23/20. I gave patient option to stay today and complete eval today or see her next week. She states she needs to go to work and would prefer to see her next week. I will submit blood work drawn today to [employer] and request to be held until patient's eval and consent is obtained next week.

## 2020-10-16 NOTE — TELEPHONE ENCOUNTER
Per avs, Please hold treatment today/infusion RN aware. Chemo next week/scheduled for 10/23/2020 @ 1000/Genetics to see pt during tx. RV 5 weeks, MD visit and chemo/Appt scheduled for 11/20/2020 @ 0930 with tx to follow @ 1000.

## 2020-10-16 NOTE — PROGRESS NOTES
_      Chief Complaint   Patient presents with    Follow-up     review status of disease    Nausea    Emesis    Diarrhea    Constipation    Fatigue    Numbness     feet    Anorexia    Pain     from procedure     DIAGNOSIS:       Recurrent ovarian cancer. Original diagnosis 2005 with multiple recurrences. Diffuse metastasis. CURRENT THERAPY:         Doxil/ Avastin started 9/18/2020. Avastin will be discontinued due to recent GI bleeding. Status post recent vascular embolization    BRIEF CASE HISTORY:      Ms. Leo Velásquez is a very pleasant 60 y.o. female with history of multiple co morbidities as listed. The patient seen in consultation for ovarian cancer with multiple recurrences. 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 She had surgical debulking and she had systemic treatment with Taxol and carboplatin for 6 cycles. Patient was in remission for about 2 years. She had a relapse in 2007 and treated with topotecan with good results. Patient relapsed again in 2008 and was treated with Taxol carboplatin. After 3 cycles of systemic chemotherapy she had problems with carboplatin so she finished 3 more cycles with Taxol. She did well again for 2 to 3 years until she had a relapse in 2012 and she had intraperitoneal chemotherapy treatment with Taxol. Patient was last seen by her oncologist in 2014 at which time she had relatively stable disease with normal tumor marker and no significant abnormal images. Patient moved to [place] after that and she was lost for oncology follow-ups. Patient was recently evaluated again here in [facility] because of abdominal discomfort. Re imaging confirmed metastatic relapse. Biopsy confirmed ovarian cancer recurrence. Scan showed extensive intraabdominal and splenic involvement and lung mets.  She has no symptoms at the present time. Patient denies smoking or alcohol drinking.l    INTERIM HISTORY:    patient is seen for follow up recurrent ovarian cancer. Patient received 1 cycle of palliative chemotherapy using Doxil/ Avastin. Patient was hospitalized at [facility] last week because of severe GI bleeding. She had multiple blood transfusions. She had GI and vascular evaluation. She had evidence of intra-abdominal mass at the splenic area with GI infiltration. Patient had embolization by vascular. Currently she feels better. No clear active bleeding. No melena or hematochezia. No hematemesis. PAST MEDICAL HISTORY: has a past medical history of Anemia, Cervical cancer ([facility] [address] Depression, Diabetes mellitus ([facility] [address] and GERD (gastroesophageal reflux disease). PAST SURGICAL HISTORY: has a past surgical history that includes Hysterectomy, total abdominal; Port Surgery; Tonsillectomy; and IR PORT PLACEMENT > 5 YEARS (8/24/2020). CURRENT MEDICATIONS:  has a current medication list which includes the following prescription(s): oxycodone-acetaminophen, pantoprazole, metformin, sertraline, ferrous sulfate, aspirin, and simvastatin, and the following Facility-Administered Medications: sodium chloride flush and heparin flush. ALLERGIES:  has No Known Allergies. FAMILY HISTORY: Negative for any hematological or oncological conditions. SOCIAL HISTORY:  reports that she has been smoking. She has been smoking about 1.00 pack per day. She uses smokeless tobacco. She reports previous alcohol use. She reports that she does not use drugs. REVIEW OF SYSTEMS:     · General: No weakness or fatigue. No unanticipated weight loss or decreased appetite. No fever or chills. · Eyes: No blurred vision, eye pain or double vision. · Ears: No hearing problems or drainage. No tinnitus. · Throat: No sore throat, problems with swallowing or dysphagia. · Respiratory: No cough, sputum or hemoptysis.  No shortness of breath. No pleuritic chest pain. · Cardiovascular: No chest pain, orthopnea or PND. No lower extremity edema. No palpitation. · Gastrointestinal: As above. · Genitourinary: No dysuria, hematuria, frequency or urgency. · Musculoskeletal: No muscle aches or pains. No limitation of movement. No back pain. No gait disturbance, No joint complaints. · Dermatologic: No skin rashes or pruritus. No skin lesions or discolorations. · Psychiatric: No depression, anxiety, or stress or signs of schizophrenia. No change in mood or affect. · Hematologic: No history of bleeding tendency. No bruises or ecchymosis. No history of clotting problems. · Infectious disease: No fever, chills or frequent infections. · Endocrine: No polydipsia or polyuria. No temperature intolerance. · Neurologic: No headaches or dizziness. No weakness or numbness of the extremities. No changes in balance, coordination,  memory, mentation, behavior. · Allergic/Immunologic: No nasal congestion or hives. No repeated infections. PHYSICAL EXAM:  The patient is not in acute distress. Vital signs: Blood pressure (!) 150/87, pulse 116, temperature 98.1 °F (36.7 °C), temperature source Oral, resp. rate 18, weight 181 lb 6.4 oz (82.3 kg).      General appearance - well appearing, not in pain or distress  Mental status - good mood, alert and oriented  Eyes - pupils equal and reactive, extraocular eye movements intact  Ears - bilateral TM's and external ear canals normal  Nose - normal and patent, no erythema, discharge or polyps  Mouth - mucous membranes moist, pharynx normal without lesions  Neck - supple, no significant adenopathy  Lymphatics - no palpable lymphadenopathy, no hepatosplenomegaly  Chest - clear to auscultation, no wheezes, rales or rhonchi, symmetric air entry  Heart - normal rate, regular rhythm, normal S1, S2, no murmurs, rubs, clicks or gallops  Abdomen - soft, nontender, nondistended, no masses or organomegaly  Neurological - alert, oriented, normal speech, no focal findings or movement disorder noted  Musculoskeletal - no joint tenderness, deformity or swelling  Extremities - peripheral pulses normal, no pedal edema, no clubbing or cyanosis  Skin - normal coloration and turgor, no rashes, no suspicious skin lesions noted     Review of Diagnostic data:   Lab Results   Component Value Date    WBC 14.7 (H) 10/16/2020    HGB 10.2 (L) 10/16/2020    HCT 32.1 (L) 10/16/2020    MCV 83.6 10/16/2020    PLT 1,384 (HH) 10/16/2020       Chemistry        Component Value Date/Time     10/16/2020 0955    K 3.8 10/16/2020 0955     10/16/2020 0955    CO2 24 10/16/2020 0955    BUN 6 10/16/2020 0955    CREATININE 0.48 (L) 10/16/2020 0955        Component Value Date/Time    CALCIUM 9.4 10/16/2020 0955    ALKPHOS 272 (H) 10/16/2020 0955    AST 8 10/16/2020 0955    ALT 11 10/16/2020 0955    BILITOT 0.14 (L) 10/16/2020 0955          Lab Results   Component Value Date     223 (H) 10/05/2020         IMPRESSION:   Recurrent ovarian cancer. Original diagnosis 2005 with multiple recurrences. Diffuse metastasis. Recent active intra-abdominal bleeding due to splenic mass with GI infiltration. Status post embolization    PLAN: records from outside facilities were reviewed and discussed. I explained to the patient the nature of ovarian cancer, staging, prognosis and treatment options. She had multiple recurrences over the last 15 years. We explained results of PET/CT scan staging evaluation. Discussed palliative treatment. Different regimens were discussed. We prescribed Doxil as single agent but insurance declined. Patient was started on Doxil and Avastin. She tolerated treatment fairly well. However due to recent episode of intra-abdominal bleeding we will discontinue Avastin. Next treatment will be with the use of Doxil. Patient had embolization by vascular last week. We discussed resuming treatment.   We will not resume Avastin but we will go ahead with Doxil. We will resume treatment next week. Monitor closely for any signs of bleeding. Will transfuse blood as needed. Monitor toxicity and response to treatment  Patient's questions were answered to the best of her satisfaction and she verbalized full understanding and agreement.

## 2020-10-16 NOTE — TELEPHONE ENCOUNTER
Name: Aneta Aguayo [facility]  : 1965  MRN: [id number]    Oncology Navigation Follow-Up Note    Contact Type:  Medical Oncology  Notes: Pt @ [facility] for Dr. Junior Sandoval f/u & tx. Met with pt & pt's son prior to f/u. Pt tearful, voiced anxiety r/t dx & prognosis, support given. Pt's son inquired on financial assistance for pt. Inquired on [facility] referrals. Pt stated only received 1 gas gift card from [facility] & using [facility] assistance. Instructed pt writer will contact Rayna, [facility], to update on appts & encouraged pt to contact [facility] in future with appts. Instructed pt & pt's son Hubert Leiva will contact Ori Mcintosh, [facility] , & request contact pt. Pt verbalized understanding, denied further questions/concerns, & thanked writer. Instructed pt may contact writer prn. Dr. Junior Sandoval completed f/u, tx held today & RS 10/23/2020. Attempted to contact Rayna, no answer, VM left updated on pt. Attempted to contact Ori Mcintosh, no answer, VM left updated on pt. Will continue to follow.     Electronically signed by Johann Tran RN on 10/16/2020 at 10:53 AM

## 2020-10-20 ENCOUNTER — TELEPHONE (OUTPATIENT)
Dept: ONCOLOGY | Age: 55
End: 2020-10-20

## 2020-10-20 NOTE — TELEPHONE ENCOUNTER
Call received from Damion Braswell at [facility] stating patient had a pedicure recently and has 3 swollen toes. Damion Braswell stated that the patient is requesting a rx for an antibiotic. Jonatan called patient and she stated she had a pedicure approximately 3 weeks ago and stated she thinks she might have infected toe nails. Writer instructed patient to contact PCP for further assessment and management. Patient stated she does not have a PCP. Writer instructed patient to go to an urgent care for further management. Writer spoke with Dr Elodia Rush and informed him of above. Dr Elodia Rush stated to have patient obtain a PCP and stated if patient cannot make it to urgent care by this Friday, to schedule a f/u appt with further assessment with him. Writer transferred call to Bill, , to schedule appt by this Friday as patient refused to go to urgent care. Patient hung up the phone and did not talk to Bill.  Shasta Espinoza

## 2020-10-21 ENCOUNTER — TELEPHONE (OUTPATIENT)
Dept: ONCOLOGY | Age: 55
End: 2020-10-21

## 2020-10-21 PROBLEM — R56.9 NEW ONSET SEIZURE (HCC): Status: ACTIVE | Noted: 2020-10-21

## 2020-10-22 ENCOUNTER — APPOINTMENT (OUTPATIENT)
Dept: INTERVENTIONAL RADIOLOGY/VASCULAR | Age: 55
DRG: 071 | End: 2020-10-22
Attending: INTERNAL MEDICINE
Payer: COMMERCIAL

## 2020-10-22 ENCOUNTER — TELEPHONE (OUTPATIENT)
Dept: ONCOLOGY | Age: 55
End: 2020-10-22

## 2020-10-22 ENCOUNTER — APPOINTMENT (OUTPATIENT)
Dept: MRI IMAGING | Age: 55
DRG: 071 | End: 2020-10-22
Attending: INTERNAL MEDICINE
Payer: COMMERCIAL

## 2020-10-22 ENCOUNTER — HOSPITAL ENCOUNTER (INPATIENT)
Age: 55
LOS: 7 days | Discharge: HOME HEALTH | DRG: 071 | End: 2020-10-29
Attending: INTERNAL MEDICINE | Admitting: INTERNAL MEDICINE
Payer: COMMERCIAL

## 2020-10-22 LAB
ABSOLUTE EOS #: 0 K/UL (ref 0–0.4)
ABSOLUTE IMMATURE GRANULOCYTE: 0.24 K/UL (ref 0–0.3)
ABSOLUTE LYMPH #: 2.42 K/UL (ref 1–4.8)
ABSOLUTE MONO #: 1.21 K/UL (ref 0.1–0.8)
ALBUMIN CSF: 129 MG/L (ref 70–350)
ALBUMIN INDEX: 39.1
ALBUMIN SERPL-MCNC: 3.1 G/DL (ref 3.5–5.2)
ALBUMIN SERPL-MCNC: 3.3 G/DL (ref 3.5–5.2)
ALBUMIN/GLOBULIN RATIO: 0.9 (ref 1–2.5)
ALP BLD-CCNC: 134 U/L (ref 35–104)
ALT SERPL-CCNC: 5 U/L (ref 5–33)
AMMONIA: 63 UMOL/L (ref 11–51)
ANION GAP SERPL CALCULATED.3IONS-SCNC: 12 MMOL/L (ref 9–17)
APPEARANCE CSF: CLEAR
AST SERPL-CCNC: 9 U/L
BASOPHILS # BLD: 0 % (ref 0–2)
BASOPHILS ABSOLUTE: 0 K/UL (ref 0–0.2)
BILIRUB SERPL-MCNC: <0.1 MG/DL (ref 0.3–1.2)
BUN BLDV-MCNC: 4 MG/DL (ref 6–20)
BUN/CREAT BLD: ABNORMAL (ref 9–20)
C-REACTIVE PROTEIN: 52.2 MG/L (ref 0–5)
CALCIUM SERPL-MCNC: 8.7 MG/DL (ref 8.6–10.4)
CHLORIDE BLD-SCNC: 100 MMOL/L (ref 98–107)
CO2: 20 MMOL/L (ref 20–31)
CREAT SERPL-MCNC: 0.4 MG/DL (ref 0.5–0.9)
DIFFERENTIAL TYPE: ABNORMAL
DIRECT EXAM: ABNORMAL
EOSINOPHILS RELATIVE PERCENT: 0 % (ref 1–4)
FOLATE: 11.6 NG/ML
GFR AFRICAN AMERICAN: >60 ML/MIN
GFR NON-AFRICAN AMERICAN: >60 ML/MIN
GFR SERPL CREATININE-BSD FRML MDRD: ABNORMAL ML/MIN/{1.73_M2}
GFR SERPL CREATININE-BSD FRML MDRD: ABNORMAL ML/MIN/{1.73_M2}
GLUCOSE BLD-MCNC: 107 MG/DL (ref 70–99)
GLUCOSE BLD-MCNC: 144 MG/DL (ref 65–105)
GLUCOSE, CSF: 78 MG/DL (ref 40–70)
HCT VFR BLD CALC: 32.4 % (ref 36.3–47.1)
HEMOGLOBIN: 10 G/DL (ref 11.9–15.1)
IGG CSF: 1.7 MG/DL (ref 0.5–7)
IGG INDEX CSF: 0.59
IGG SYNTHESIS RATE CSF: < 3.3 MG/24 H
IGG: 742 MG/DL (ref 700–1600)
IMMATURE GRANULOCYTES: 1 %
INR BLD: 1
LYMPHOCYTES # BLD: 10 % (ref 24–44)
Lab: ABNORMAL
MCH RBC QN AUTO: 25.8 PG (ref 25.2–33.5)
MCHC RBC AUTO-ENTMCNC: 30.9 G/DL (ref 28.4–34.8)
MCV RBC AUTO: 83.5 FL (ref 82.6–102.9)
MONOCYTES # BLD: 5 % (ref 1–7)
MORPHOLOGY: ABNORMAL
NRBC AUTOMATED: 0 PER 100 WBC
OLIGOCLONAL BANDS: ABNORMAL
PDW BLD-RTO: 21.2 % (ref 11.8–14.4)
PLATELET # BLD: 691 K/UL (ref 138–453)
PLATELET ESTIMATE: ABNORMAL
PMV BLD AUTO: 8.8 FL (ref 8.1–13.5)
POTASSIUM SERPL-SCNC: 3.4 MMOL/L (ref 3.7–5.3)
PROCALCITONIN: 0.06 NG/ML
PROTEIN CSF: 25.4 MG/DL (ref 15–45)
PROTHROMBIN TIME: 10.8 SEC (ref 9–12)
RBC # BLD: 3.88 M/UL (ref 3.95–5.11)
RBC # BLD: ABNORMAL 10*6/UL
RBC CSF: 0 /MM3
SEG NEUTROPHILS: 84 % (ref 36–66)
SEGMENTED NEUTROPHILS ABSOLUTE COUNT: 20.33 K/UL (ref 1.8–7.7)
SODIUM BLD-SCNC: 132 MMOL/L (ref 135–144)
SPECIMEN DESCRIPTION: ABNORMAL
SUPERNAT COLOR CSF: NORMAL
TOTAL PROTEIN: 6.5 G/DL (ref 6.4–8.3)
TSH SERPL DL<=0.05 MIU/L-ACNC: 0.93 MIU/L (ref 0.3–5)
TUBE NUMBER CSF: 3
VITAMIN B-12: 405 PG/ML (ref 232–1245)
VOLUME CSF: 5
WBC # BLD: 24.2 K/UL (ref 3.5–11.3)
WBC # BLD: ABNORMAL 10*3/UL
WBC CSF: 0 /MM3
XANTHOCHROMIA: NORMAL

## 2020-10-22 PROCEDURE — 99223 1ST HOSP IP/OBS HIGH 75: CPT | Performed by: INTERNAL MEDICINE

## 2020-10-22 PROCEDURE — 82140 ASSAY OF AMMONIA: CPT

## 2020-10-22 PROCEDURE — 2060000000 HC ICU INTERMEDIATE R&B

## 2020-10-22 PROCEDURE — 2580000003 HC RX 258: Performed by: NURSE PRACTITIONER

## 2020-10-22 PROCEDURE — APPSS60 APP SPLIT SHARED TIME 46-60 MINUTES: Performed by: NURSE PRACTITIONER

## 2020-10-22 PROCEDURE — 85610 PROTHROMBIN TIME: CPT

## 2020-10-22 PROCEDURE — 82042 OTHER SOURCE ALBUMIN QUAN EA: CPT

## 2020-10-22 PROCEDURE — 82040 ASSAY OF SERUM ALBUMIN: CPT

## 2020-10-22 PROCEDURE — 6360000002 HC RX W HCPCS: Performed by: FAMILY MEDICINE

## 2020-10-22 PROCEDURE — 94761 N-INVAS EAR/PLS OXIMETRY MLT: CPT

## 2020-10-22 PROCEDURE — 99223 1ST HOSP IP/OBS HIGH 75: CPT | Performed by: PSYCHIATRY & NEUROLOGY

## 2020-10-22 PROCEDURE — 83916 OLIGOCLONAL BANDS: CPT

## 2020-10-22 PROCEDURE — 2709999900 HC NON-CHARGEABLE SUPPLY

## 2020-10-22 PROCEDURE — 83873 ASSAY OF CSF PROTEIN: CPT

## 2020-10-22 PROCEDURE — 6370000000 HC RX 637 (ALT 250 FOR IP): Performed by: NURSE PRACTITIONER

## 2020-10-22 PROCEDURE — 009U3ZX DRAINAGE OF SPINAL CANAL, PERCUTANEOUS APPROACH, DIAGNOSTIC: ICD-10-PCS | Performed by: RADIOLOGY

## 2020-10-22 PROCEDURE — 85025 COMPLETE CBC W/AUTO DIFF WBC: CPT

## 2020-10-22 PROCEDURE — 95819 EEG AWAKE AND ASLEEP: CPT

## 2020-10-22 PROCEDURE — 82947 ASSAY GLUCOSE BLOOD QUANT: CPT

## 2020-10-22 PROCEDURE — 84157 ASSAY OF PROTEIN OTHER: CPT

## 2020-10-22 PROCEDURE — 36415 COLL VENOUS BLD VENIPUNCTURE: CPT

## 2020-10-22 PROCEDURE — 6360000002 HC RX W HCPCS: Performed by: NURSE PRACTITIONER

## 2020-10-22 PROCEDURE — 80053 COMPREHEN METABOLIC PANEL: CPT

## 2020-10-22 PROCEDURE — 82607 VITAMIN B-12: CPT

## 2020-10-22 PROCEDURE — 86592 SYPHILIS TEST NON-TREP QUAL: CPT

## 2020-10-22 PROCEDURE — 86255 FLUORESCENT ANTIBODY SCREEN: CPT

## 2020-10-22 PROCEDURE — 82945 GLUCOSE OTHER FLUID: CPT

## 2020-10-22 PROCEDURE — 89050 BODY FLUID CELL COUNT: CPT

## 2020-10-22 PROCEDURE — 80307 DRUG TEST PRSMV CHEM ANLYZR: CPT

## 2020-10-22 PROCEDURE — 86140 C-REACTIVE PROTEIN: CPT

## 2020-10-22 PROCEDURE — 2580000003 HC RX 258: Performed by: INTERNAL MEDICINE

## 2020-10-22 PROCEDURE — 82784 ASSAY IGA/IGD/IGG/IGM EACH: CPT

## 2020-10-22 PROCEDURE — 62328 DX LMBR SPI PNXR W/FLUOR/CT: CPT

## 2020-10-22 PROCEDURE — 84145 PROCALCITONIN (PCT): CPT

## 2020-10-22 PROCEDURE — 6360000002 HC RX W HCPCS: Performed by: INTERNAL MEDICINE

## 2020-10-22 PROCEDURE — 84443 ASSAY THYROID STIM HORMONE: CPT

## 2020-10-22 PROCEDURE — 82746 ASSAY OF FOLIC ACID SERUM: CPT

## 2020-10-22 PROCEDURE — 86341 ISLET CELL ANTIBODY: CPT

## 2020-10-22 PROCEDURE — 87327 CRYPTOCOCCUS NEOFORM AG IA: CPT

## 2020-10-22 PROCEDURE — 87205 SMEAR GRAM STAIN: CPT

## 2020-10-22 RX ORDER — LIDOCAINE HYDROCHLORIDE 20 MG/ML
5 INJECTION, SOLUTION INFILTRATION; PERINEURAL ONCE
Status: DISCONTINUED | OUTPATIENT
Start: 2020-10-22 | End: 2020-10-22 | Stop reason: SDUPTHER

## 2020-10-22 RX ORDER — ONDANSETRON 2 MG/ML
4 INJECTION INTRAMUSCULAR; INTRAVENOUS EVERY 6 HOURS PRN
Status: DISCONTINUED | OUTPATIENT
Start: 2020-10-22 | End: 2020-10-29 | Stop reason: HOSPADM

## 2020-10-22 RX ORDER — HYDROCODONE BITARTRATE AND ACETAMINOPHEN 5; 325 MG/1; MG/1
1 TABLET ORAL EVERY 6 HOURS PRN
COMMUNITY
End: 2021-01-15 | Stop reason: SDUPTHER

## 2020-10-22 RX ORDER — SODIUM CHLORIDE 0.9 % (FLUSH) 0.9 %
10 SYRINGE (ML) INJECTION PRN
Status: DISCONTINUED | OUTPATIENT
Start: 2020-10-22 | End: 2020-10-29 | Stop reason: HOSPADM

## 2020-10-22 RX ORDER — OXYCODONE HCL 10 MG/1
10 TABLET, FILM COATED, EXTENDED RELEASE ORAL EVERY 12 HOURS
COMMUNITY
End: 2020-12-30 | Stop reason: SDUPTHER

## 2020-10-22 RX ORDER — HALOPERIDOL 5 MG/ML
5 INJECTION INTRAMUSCULAR ONCE
Status: COMPLETED | OUTPATIENT
Start: 2020-10-22 | End: 2020-10-22

## 2020-10-22 RX ORDER — PROCHLORPERAZINE MALEATE 10 MG
10 TABLET ORAL EVERY 6 HOURS PRN
COMMUNITY
End: 2021-02-17 | Stop reason: ALTCHOICE

## 2020-10-22 RX ORDER — LORAZEPAM 2 MG/ML
2 INJECTION INTRAMUSCULAR
Status: ACTIVE | OUTPATIENT
Start: 2020-10-22 | End: 2020-10-22

## 2020-10-22 RX ORDER — ACETAMINOPHEN 325 MG/1
650 TABLET ORAL EVERY 4 HOURS PRN
Status: DISCONTINUED | OUTPATIENT
Start: 2020-10-22 | End: 2020-10-29 | Stop reason: HOSPADM

## 2020-10-22 RX ORDER — POLYETHYLENE GLYCOL 3350 17 G/17G
17 POWDER, FOR SOLUTION ORAL DAILY PRN
Status: DISCONTINUED | OUTPATIENT
Start: 2020-10-22 | End: 2020-10-29 | Stop reason: HOSPADM

## 2020-10-22 RX ORDER — LORAZEPAM 2 MG/ML
2 INJECTION INTRAMUSCULAR EVERY 4 HOURS PRN
Status: DISCONTINUED | OUTPATIENT
Start: 2020-10-22 | End: 2020-10-29 | Stop reason: HOSPADM

## 2020-10-22 RX ORDER — SODIUM CHLORIDE 9 MG/ML
INJECTION, SOLUTION INTRAVENOUS CONTINUOUS
Status: DISCONTINUED | OUTPATIENT
Start: 2020-10-22 | End: 2020-10-25

## 2020-10-22 RX ORDER — NICOTINE 21 MG/24HR
1 PATCH, TRANSDERMAL 24 HOURS TRANSDERMAL DAILY PRN
Status: DISCONTINUED | OUTPATIENT
Start: 2020-10-22 | End: 2020-10-29 | Stop reason: HOSPADM

## 2020-10-22 RX ORDER — MIDAZOLAM HYDROCHLORIDE 1 MG/ML
2 INJECTION INTRAMUSCULAR; INTRAVENOUS
Status: DISCONTINUED | OUTPATIENT
Start: 2020-10-22 | End: 2020-10-22

## 2020-10-22 RX ORDER — LORAZEPAM 2 MG/ML
2 INJECTION INTRAMUSCULAR ONCE
Status: COMPLETED | OUTPATIENT
Start: 2020-10-22 | End: 2020-10-22

## 2020-10-22 RX ORDER — ACETAMINOPHEN 650 MG/1
650 SUPPOSITORY RECTAL EVERY 6 HOURS PRN
Status: DISCONTINUED | OUTPATIENT
Start: 2020-10-22 | End: 2020-10-29 | Stop reason: HOSPADM

## 2020-10-22 RX ORDER — PROMETHAZINE HYDROCHLORIDE 12.5 MG/1
12.5 TABLET ORAL EVERY 6 HOURS PRN
Status: DISCONTINUED | OUTPATIENT
Start: 2020-10-22 | End: 2020-10-29 | Stop reason: HOSPADM

## 2020-10-22 RX ORDER — LIDOCAINE HYDROCHLORIDE 20 MG/ML
5 INJECTION, SOLUTION EPIDURAL; INFILTRATION; INTRACAUDAL; PERINEURAL ONCE
Status: DISCONTINUED | OUTPATIENT
Start: 2020-10-22 | End: 2020-10-29 | Stop reason: HOSPADM

## 2020-10-22 RX ORDER — TIZANIDINE 4 MG/1
4 TABLET ORAL EVERY 6 HOURS PRN
COMMUNITY
End: 2021-02-17 | Stop reason: ALTCHOICE

## 2020-10-22 RX ORDER — LEVETIRACETAM 5 MG/ML
500 INJECTION INTRAVASCULAR EVERY 12 HOURS
Status: DISCONTINUED | OUTPATIENT
Start: 2020-10-22 | End: 2020-10-23

## 2020-10-22 RX ORDER — HALOPERIDOL 5 MG/ML
5 INJECTION INTRAMUSCULAR ONCE
Status: DISCONTINUED | OUTPATIENT
Start: 2020-10-22 | End: 2020-10-22

## 2020-10-22 RX ORDER — ACETAMINOPHEN 325 MG/1
650 TABLET ORAL EVERY 6 HOURS PRN
Status: DISCONTINUED | OUTPATIENT
Start: 2020-10-22 | End: 2020-10-22 | Stop reason: DRUGHIGH

## 2020-10-22 RX ORDER — LORAZEPAM 2 MG/ML
2 INJECTION INTRAMUSCULAR ONCE
Status: DISCONTINUED | OUTPATIENT
Start: 2020-10-22 | End: 2020-10-25

## 2020-10-22 RX ORDER — OMEPRAZOLE 20 MG/1
20 CAPSULE, DELAYED RELEASE ORAL DAILY
COMMUNITY
End: 2021-01-27 | Stop reason: ALTCHOICE

## 2020-10-22 RX ORDER — SODIUM CHLORIDE 0.9 % (FLUSH) 0.9 %
10 SYRINGE (ML) INJECTION EVERY 12 HOURS SCHEDULED
Status: DISCONTINUED | OUTPATIENT
Start: 2020-10-22 | End: 2020-10-29 | Stop reason: HOSPADM

## 2020-10-22 RX ORDER — IBUPROFEN 200 MG
200 TABLET ORAL EVERY 6 HOURS PRN
COMMUNITY
End: 2021-02-05 | Stop reason: ALTCHOICE

## 2020-10-22 RX ADMIN — VANCOMYCIN HYDROCHLORIDE 1500 MG: 10 INJECTION, POWDER, LYOPHILIZED, FOR SOLUTION INTRAVENOUS at 17:02

## 2020-10-22 RX ADMIN — SODIUM CHLORIDE, PRESERVATIVE FREE 10 ML: 5 INJECTION INTRAVENOUS at 23:16

## 2020-10-22 RX ADMIN — CEFTRIAXONE SODIUM 2 G: 2 INJECTION, POWDER, FOR SOLUTION INTRAMUSCULAR; INTRAVENOUS at 14:59

## 2020-10-22 RX ADMIN — HALOPERIDOL LACTATE 5 MG: 5 INJECTION, SOLUTION INTRAMUSCULAR at 14:06

## 2020-10-22 RX ADMIN — LORAZEPAM 2 MG: 2 INJECTION INTRAMUSCULAR; INTRAVENOUS at 12:19

## 2020-10-22 RX ADMIN — SODIUM CHLORIDE: 9 INJECTION, SOLUTION INTRAVENOUS at 02:00

## 2020-10-22 RX ADMIN — SODIUM CHLORIDE: 9 INJECTION, SOLUTION INTRAVENOUS at 23:24

## 2020-10-22 RX ADMIN — ACYCLOVIR SODIUM 600 MG: 50 INJECTION, SOLUTION INTRAVENOUS at 03:00

## 2020-10-22 RX ADMIN — LEVETIRACETAM 500 MG: 5 INJECTION INTRAVENOUS at 02:00

## 2020-10-22 RX ADMIN — ACYCLOVIR SODIUM 600 MG: 50 INJECTION, SOLUTION INTRAVENOUS at 10:58

## 2020-10-22 RX ADMIN — ACYCLOVIR SODIUM 600 MG: 50 INJECTION, SOLUTION INTRAVENOUS at 18:47

## 2020-10-22 RX ADMIN — LEVETIRACETAM 500 MG: 5 INJECTION INTRAVENOUS at 16:18

## 2020-10-22 RX ADMIN — CEFTRIAXONE SODIUM 2 G: 2 INJECTION, POWDER, FOR SOLUTION INTRAMUSCULAR; INTRAVENOUS at 02:31

## 2020-10-22 RX ADMIN — ACETAMINOPHEN 650 MG: 650 SUPPOSITORY RECTAL at 03:53

## 2020-10-22 RX ADMIN — VANCOMYCIN HYDROCHLORIDE 1500 MG: 10 INJECTION, POWDER, LYOPHILIZED, FOR SOLUTION INTRAVENOUS at 04:45

## 2020-10-22 ASSESSMENT — PAIN SCALES - GENERAL
PAINLEVEL_OUTOF10: 0
PAINLEVEL_OUTOF10: 0

## 2020-10-22 ASSESSMENT — PAIN SCALES - WONG BAKER: WONGBAKER_NUMERICALRESPONSE: 0

## 2020-10-22 NOTE — TELEPHONE ENCOUNTER
Call received from Chanda Alejandra, patient's son, stating patient received pneumonia and flu vaccine she had a seizure and is now unconscious and admitted @ [address] [place] [address]. Writer called and informed Dr Tracy Sharp of above. Dr Tracy Sharp stated to inform Dr Cirilo Fitzpatrick of above as Dr Cirilo Fitzpatrick is covering [facility] today. Dr Cirilo Fitzpatrick informed of above and communicated understanding.  Breanna Campoverde

## 2020-10-22 NOTE — PROGRESS NOTES
Physical Therapy  DATE: 10/22/2020    NAME: Indu Jordan  MRN: [id number]   : 1965    Patient not seen this date for Physical Therapy due to:  [] Blood transfusion in progress  [] Hemodialysis  []  Patient Declined  [] Spine Precautions   [] Strict Bedrest  [] Surgery/ Procedure  [] Testing      [x] Other- RN states pt is not appropriate for therapy this date. Pt with pending MRI ordered which notes state is now a stat MRI.         [] PT being discontinued at this time. Patient independent. No further needs. [] PT being discontinued at this time as the patient has been transferred to palliative care. No further needs.     Omari Noguera, PT

## 2020-10-22 NOTE — CONSULTS
[facility] Neurology   IN-PATIENT SERVICE      NEUROLOGY CONSULT  NOTE            Date:   10/22/2020  Patient name:  Cesar Bates [facility]  Date of admission:  10/22/2020  YOB: 1965      Chief Complaint:     No chief complaint on file. Reason for Consult:      AMS and new onset seizure     History of Present Illness: The patient is a 60 y.o. female who presents with No chief complaint on file. and she is admitted to the hospital for the management of  AMS and new onset seizure. The patient was seen and examined and the chart was reviewed. History obtained from nursing staff, EMR, no family present at the bedside      58-year-old female with past medical history of malignant neoplasm of the ovary, DM, new onset seizure, anemia presented to [facility] facility 10/21/2020 with chief complaint of confusion after receiving pneumococcal influenza vaccine    Patient had episode of seizure in the ED, was loaded with Keppra 1000 mg.  CT head: Negative for acute intracranial abnormality  CTA head and neck: No LVO  Labs: WBC 26.9, hemoglobin 7.6, platelet 2582(BX)    Patient was started on acyclovir 10 mg/kg every 8, vancomycin, ceftriaxone and transferred to [facility] for further management. Of note:  Patient diagnosed with colon cancer in 2005 with intraperitoneal carcinomatosis patient follows hematology oncology Dr. Lien Huber as outpatient. Patient was recently seen in heme-onc clinic on 10/16/2020. He is currently receiving chemotherapy  Patient had a recent active intra-abdominal bleeding due to splenic mass with GI infiltration just post embolization. Past Medical History:     Past Medical History:   Diagnosis Date    Anemia     Bleeding 10/2020    intra-abdominal bleeding -due to splenic mass with GI infiltration.   Status post embolization    Cervical cancer ([facility] [address]     Depression     Diabetes mellitus (HCC)     GERD (gastroesophageal reflux disease)     Metastatic cancer ([facility] [address] 10/2020    extensive intraabdominal and splenic involvement and lung mets.  Ovarian cancer ([facility] [address]     low grade serous ovarian carcinoma    Post chemo evaluation     2007: Chemo via med onc (Dr. Larry Driscoll), 2008: Valeriano Harris due to rising CA-125, 2013: intraperitoneal chemo,12/2015: Ca125 - 25     Splenic lesion         Past Surgical History:     Past Surgical History:   Procedure Laterality Date    ABSCESS DRAINAGE  2013    Franca rectal    ANUS SURGERY      ANAL FISSURECTOMY    COLECTOMY  03/2013    ex lap, tumor debulking, transverse colectomy w reanastamosis, subgastric omentectomy, intraperitoneal port placement    HYSTERECTOMY, TOTAL ABDOMINAL      IR EMBOLIZATION HEMORRHAGE  10/05/2020    intra-abdominal bleeding -due to splenic mass with GI infiltration. Status post embolization    IR PORT PLACEMENT EQUAL OR GREATER THAN 5 YEARS  8/24/2020    IR PORT PLACEMENT EQUAL OR GREATER THAN 5 YEARS 8/24/2020 Yaniv Morgan MD [facility]    PORT SURGERY      IP Port    TONSILLECTOMY          Medications Prior to Admission:     Prior to Admission medications    Medication Sig Start Date End Date Taking? Authorizing Provider   oxyCODONE-acetaminophen (PERCOCET)  MG per tablet Take 1-2 tablets by mouth every 4 hours.  10/12/20   Historical Provider, MD   pantoprazole (PROTONIX) 40 MG tablet Take 40 mg by mouth 2 times daily    Historical Provider, MD   metFORMIN (GLUCOPHAGE-XR) 500 MG extended release tablet Take 2 tablets by mouth 2 times daily 10/5/20   Kiarra Hoyos MD   ferrous sulfate (IRON 325) 325 (65 Fe) MG tablet Take 1 tablet by mouth daily (with breakfast)  Patient not taking: Reported on 10/16/2020 9/18/20   Kiarra Hoyos MD   aspirin 81 MG chewable tablet Take 81 mg by mouth nightly    Historical Provider, MD   simvastatin (ZOCOR) 40 MG tablet Take 40 mg by mouth nightly    Historical Provider, MD   sertraline (ZOLOFT) 100 MG tablet Take 50 mg by mouth daily  8/8/20 Historical Provider, MD        Allergies:     Patient has no known allergies. Social History:     Tobacco:    reports that she has been smoking. She has been smoking about 1.00 pack per day. She uses smokeless tobacco.  Alcohol:      reports previous alcohol use. Drug Use:  reports no history of drug use. Family History:     Family History   Problem Relation Age of Onset    Alcohol Abuse Mother     Cirrhosis Mother        Review of Systems:       Unable to obtain review of systems     Physical Exam:   BP (!) 151/52   Pulse 96   Temp 99.4 °F (37.4 °C) (Oral)   Resp 23   Ht 5' 6\" (1.676 m)   Wt 181 lb 14.1 oz (82.5 kg)   SpO2 98%   BMI 29.36 kg/m²   Temp (24hrs), Av.4 °F (37.4 °C), Min:99.4 °F (37.4 °C), Max:99.4 °F (37.4 °C)        General examination:      General Appearance: Obtunded,  well appearing, and in no acute distress  HEENT: Normocephalic, atraumatic, moist mucus membranes  Neck: supple, no carotid bruits, (-) nuchal rigidity  Lungs:  Respirations unlabored, chest wall no deformity, BS normal  Cardiovascular: normal rate, regular rhythm  Abdomen: Soft, nontender, nondistended, normal bowel sounds  Skin: No gross lesions, rashes, bruising or bleeding on exposed skin area  Extremities:  peripheral pulses palpable, clubbing or edema  Psych: normal affect    NEUROLOGIC EXAMINATION    Mental status   Patient is obtunded, not following command,     Cranial nerves    II-XII grossly intact      Motor function  Moving all extremities      Sensory function withdraws to pain in b/l upper and lower extermites      Cerebellar  Not able to assess      Reflex function 2/4 symmetric throughout . Downgoing plantar response bilaterally. (-)Spear's sign bilaterally      Gait                   Not myles to assess              Diagnostics:      Laboratory Testing:  CBC: No results for input(s): WBC, HGB, PLT in the last 72 hours.   BMP:  No results for input(s): NA, K, CL, CO2, BUN, CREATININE, GLUCOSE in the last 72 hours. Lab Results   Component Value Date    ALT 11 10/16/2020    AST 8 10/16/2020    INR 0.9 08/24/2020       No results found for: PHENYTOIN, PHENYTOIN, VALPROATE, CBMZ      Imaging/Diagnostics:        EEG : Pending      CT head at [facility] facility: Negative for any acute intracranial abnormality      CTA head and neck at [facility]: No LVO      MRI Brain with and without contrast: Pending          Impression:         New onset seizure   Meningitis   Metabolic encephalopathy   Anemia   Diabetes mellitus   Ovarian cancer    Plan:    70-year-old female with past medical history of malignant neoplasm of the ovary, DM, new onset seizure, anemia presented to [facility] facility 10/21/2020 with chief complaint of confusion after receiving pneumococcal influenza vaccine       EEG   Keppra 500 mg twice daily   Acyclovir 10 mg/kg every 8   Ceftriaxone 2 g every 12   Vancomycin pharmacy dosing   CBC, BMP, ESR, lactate, pro-Spike, ammonia, UA   Seizure precaution   Fall precaution   Ativan 2 mg as needed for seizure lasting more than 3 minutes given repeat after 3 minutes please contact neuro if seizure persist.   Possible LP in a.m. IR guided.  MRI brain with without contrast   We will hold overnight Lovenox, EPC cuff for DVT prophylaxis, patient has low hemoglobin, low platelets.               Electronically signed by Luiz Humphrey MD on 10/22/2020 at 2:14 AM      Rubina Alvarado MD   PGY 2 Neurology Resident  10/22/2020 at 2:14 AM

## 2020-10-22 NOTE — PROGRESS NOTES
Primary and Neurology came to bedside to assess patient. Pt became febrile, both groups were notified. Temperature resolved after interventions. At this time, pt is tachypneic HR elevated does not respond to command per baseline assessment.

## 2020-10-22 NOTE — BRIEF OP NOTE
Brief Postoperative Note Lumbar Puncture    [facility]  YOB: 1965  [id number]    Pre-operative Diagnosis: New onset seizure    Post-operative Diagnosis: Same    Procedure: Fluoro guided Lumbar Puncture    Anesthesia: 1% Lidocaine    Surgeons/Assistants: Carolina High PA-C    Complications: None    EBL: Minimal    Specimens: Obtained and sent to lab    Fluoroscopy guided lumbar puncture. 20 gauge spinal needle. L2-L3. 8 ml clear CSF obtained. Dressing applied. Instructions given.     Electronically signed by JOANN Roche on 10/22/2020 at 2:44 PM

## 2020-10-22 NOTE — PROGRESS NOTES
Pt arrives to room for LP with Rico Ruggiero  Consent obtained via phone from alexandra [facility] and [place] RT and JM RT to bedside  Site prepped and draped  Access obtained and 8 mls of clear csf removed  Access removed and site covered with band aid    Return to floor with Osmin Sandra RN

## 2020-10-22 NOTE — H&P
Attending Supervising Physicians Attestation Statement  I have seen and examined [facility] and the key elements of all parts of the encounter have been performed by me. I agree with the assessment, plan and orders as documented by the Advanced Practice Provider. I discussed the findings and plans with the nurse practitioner and agree as documented in her note . See note of Sophia Mcconnell for more details. Additional Comments:   60 F presented with seizure episode. Patient currently lethargic, not cooperating with questions. Answering intermittently, disoriented to place. On chart review initially presented to [address] [place] [address] with confusion. Noted to have seizure episode, no prior history of seizures. Started on antibiotics, transferred to [facility] for further neurological workup.      On exam: responding intermittently, lethargic, no photophobia, no meningeal signs    Impression: New onset seizure, metabolic encephalopathy, DM, ovarian cancer    Plan:  - Neurology consult  - Check MRI  - Check EEG  - LP per IR  - Follow up CSF analysis  - Continue empiric antibiotics for now  - Seizure precautions   - Neuro checks  - Monitor glucose, continue insulin correction       Electronically signed by Nacho Claudio MD on 10/22/2020 at 9:17 AM

## 2020-10-22 NOTE — PROGRESS NOTES
Pt arrived on unit from [facility] . Report taken from [facility].

## 2020-10-22 NOTE — PLAN OF CARE
Problem: Falls - Risk of:  Goal: Will remain free from falls  Outcome: Met This Shift     Problem: Coping:  Goal: Ability to cope will improve  Outcome: Met This Shift  Goal: Ability to identify appropriate support needs will improve  Outcome: Met This Shift     No seizure like activity this shift. Remains free from injury. Safety precautions in place. Fall precatiouns in place. Pt assessed as a fall risk this shift. Remains free from falls and accidental injury at this time. Fall precautions in place, including falling star sign. Floor free from obstacles, and bed is locked and in lowest position. Adequate lighting provided. Pt encouraged to call before getting Out Of Bed for any need. Will continue to monitor needs during hourly rounding, and reinforce education on use of call light.

## 2020-10-22 NOTE — FLOWSHEET NOTE
10/22/20 1547   Encounter Summary   Services provided to: Family   Referral/Consult From: [address] [facility] [employer] None   Continue Visiting   (10/22/20)   Complexity of Encounter Low   Length of Encounter 15 minutes   Spiritual Assessment Completed Yes   Routine   Type Initial   Assessment Sleeping   Intervention Active listening;Explored feelings, thoughts, concerns;Nurtured hope;Prayer;Sustaining presence/ Ministry of presence; Discussed belief system/Worship practices/bernardo   Outcome Acceptance;Comfort;Expressed gratitude

## 2020-10-22 NOTE — PROGRESS NOTES
Pharmacy Note  Vancomycin Consult    López Pittman is a 60 y.o. female started on Vancomycin for possible CNS infection; consult received from Dr. Priscila Krueger to manage therapy. Also receiving the following antibiotics: ceftriaxone, acyclovir. Patient Active Problem List   Diagnosis    Malignant neoplasm of ovary ([facility]    [facility]    Type 2 diabetes mellitus without complication, without long-term current use of insulin ([facility]    Anemia    Splenic lesion    Ovarian cancer ([facility]       Allergies:  Patient has no known allergies. Temp max: 99.4 F  (37.4 C)    No results for input(s): BUN in the last 72 hours. No results for input(s): CREATININE in the last 72 hours. No results for input(s): WBC in the last 72 hours. No intake or output data in the 24 hours ending 10/22/20 0132    Culture Date      Source                       Results       Ht Readings from Last 1 Encounters:   10/22/20 5' 6\" (1.676 m)        Wt Readings from Last 1 Encounters:   10/22/20 181 lb 14.1 oz (82.5 kg)         Body mass index is 29.36 kg/m². Estimated Creatinine Clearance: 140 mL/min (A) (based on SCr of 0.48 mg/dL (L)). Goal Trough Level: 15-20 mcg/mL    Assessment/Plan:  Will initiate Vancomycin  1500 mg IV every 12 hours. Timing of trough level will be determined based on culture results, renal function, and clinical response. Thank you for the consult. Will continue to follow.

## 2020-10-22 NOTE — PROGRESS NOTES
Occupational Therapy Not Seen Note    DATE: 10/22/2020  Name: Akila Heaton [facility]  : 1965  MRN: [id number]    Patient not available for Occupational Therapy due to: Other: Not appropriate for OOB activity per RN.  Stat MRI ordered\    Next Scheduled Treatment: 10/23/2020    Electronically signed by SHRADDHA Abreu on 10/22/2020 at 2:13 PM

## 2020-10-22 NOTE — H&P
[facility]  Office: [address], DO, Miller Shirley, DO, Lelejose Mclean, DO, Leigh Braga, DO, Patricia Valladares MD, Holger Chance MD, Maryjane Paul MD, Nate Cuellar MD, Chantelle Muhammad MD, Juaquin Pérez MD, Dane Fairchild MD, Kristyn Higgins MD, Mena Dela Cruz MD, Sabiha Baez, DO, Gracie Goodman MD, Moira Arnold MD, Ishan Gould DO, Kayla Paul MD,  Quirino De Los Santos DO, Navjot Scanlon MD, Nabor Finn MD, Iván Murphy, CNP, [facility] OCALA DelGrosso, CNP, Lugene Nose, CNP, Aleksey Clark, CNS, Annabel Ellis, CNP, Alpha Grain, CNP, Peg Afsaneh, CNP, Cecilia Gotti, CNP, Andrew Douglas, CNP, Marsha De La Cruz PA-C, Garth Valdez, SAILAJA, Thiago Rm, CNP, Mary Alice Velzo, CNP, Jcarlos Farooq, CNP, Sung Jamison, CNP, Nikolas Man, CNP         [facility]    HISTORY AND PHYSICAL EXAMINATION            Date:   10/22/2020  Patient name:  [facility]  Date of admission:  10/22/2020 12:20 AM  MRN:   [id number]  Account:  [de-identified]  YOB: 1965  PCP:    No primary care provider on file. Room:   [address] [place] [address]  Code Status:    Full Code    Chief Complaint:   New onset seizure    History Obtained From:     electronic medical record, reason patient could not give history:  altered mental status    History of Present Illness:     Shara Mccain is a 60 y.o. Non-/non  female who presents with new onset seizure and is admitted to the hospital for the management of New onset seizure ([facility] Patient presented to [facility] with the concern of having confusion after receiving influenza and pneumonia pneumococcal vaccine. Per nursing report patient had an episode of a seizure in the emergency room at that time she was loaded with Keppra and given Ativan started on acyclovir 10 mg/kg every 8, vancomycin, ceftriaxone and transferred for further evaluation.     Upon arrival to [address] [facility] [address] [facility] she is difficult to arouse but responsive to noxious stimuli. She does not follow commands but moves all 4 extremities. She does not partake in the review of systems or HPI and there is no family present for communication. In review of the chart and care everywhere patient has had sent hospital stay at [place] [address] for an upper GI bleed and underwent embolization with interventional radiology for intra-abdominal bleeding due to a splenic mass with GI infiltration. She received approximately 5 units of blood for marked anemia. Past Medical History:     Past Medical History:   Diagnosis Date    Anemia     Bleeding 10/2020    intra-abdominal bleeding -due to splenic mass with GI infiltration. Status post embolization    Cervical cancer ([facility]     Depression     Diabetes mellitus ([facility]     GERD (gastroesophageal reflux disease)     Metastatic cancer ([facility] 10/2020    extensive intraabdominal and splenic involvement and lung mets.  Ovarian cancer ([facility]     low grade serous ovarian carcinoma    Post chemo evaluation     2007: Chemo via med onc (Dr. Lui Burrell), 2008: Marilou Laurenan due to rising CA-125, 2013: intraperitoneal chemo,12/2015: Ca125 - 25     Splenic lesion         Past Surgical History:     Past Surgical History:   Procedure Laterality Date    ABSCESS DRAINAGE  2013    Franca rectal    ANUS SURGERY      ANAL FISSURECTOMY    COLECTOMY  03/2013    ex lap, tumor debulking, transverse colectomy w reanastamosis, subgastric omentectomy, intraperitoneal port placement    HYSTERECTOMY, TOTAL ABDOMINAL      IR EMBOLIZATION HEMORRHAGE  10/05/2020    intra-abdominal bleeding -due to splenic mass with GI infiltration.   Status post embolization    IR PORT PLACEMENT EQUAL OR GREATER THAN 5 YEARS  8/24/2020    IR PORT PLACEMENT EQUAL OR GREATER THAN 5 YEARS 8/24/2020 Allison Heaton MD [facility]    PORT SURGERY      IP Port    TONSILLECTOMY          Medications Prior to Admission: Prior to Admission medications    Medication Sig Start Date End Date Taking? Authorizing Provider   oxyCODONE-acetaminophen (PERCOCET)  MG per tablet Take 1-2 tablets by mouth every 4 hours. 10/12/20   Historical Provider, MD   pantoprazole (PROTONIX) 40 MG tablet Take 40 mg by mouth 2 times daily    Historical Provider, MD   metFORMIN (GLUCOPHAGE-XR) 500 MG extended release tablet Take 2 tablets by mouth 2 times daily 10/5/20   Dmitry Hunter MD   ferrous sulfate (IRON 325) 325 (65 Fe) MG tablet Take 1 tablet by mouth daily (with breakfast)  Patient not taking: Reported on 10/16/2020 9/18/20   Dmitry Hunter MD   aspirin 81 MG chewable tablet Take 81 mg by mouth nightly    Historical Provider, MD   simvastatin (ZOCOR) 40 MG tablet Take 40 mg by mouth nightly    Historical Provider, MD   sertraline (ZOLOFT) 100 MG tablet Take 50 mg by mouth daily  20   Historical Provider, MD        Allergies:     Patient has no known allergies. Social History:     Tobacco:    reports that she has been smoking. She has been smoking about 1.00 pack per day. She uses smokeless tobacco.  Alcohol:      reports previous alcohol use. Drug Use:  reports no history of drug use. Family History:     Family History   Problem Relation Age of Onset    Alcohol Abuse Mother     Cirrhosis Mother        Review of Systems:     Positive and Negative as described in HPI. Unable to obtain secondary to patient's mentation    Physical Exam:   BP (!) 151/52   Pulse 96   Temp 99.4 °F (37.4 °C) (Oral)   Resp 23   Ht 5' 6\" (1.676 m)   Wt 181 lb 14.1 oz (82.5 kg)   SpO2 98%   BMI 29.36 kg/m²   Temp (24hrs), Av.4 °F (37.4 °C), Min:99.4 °F (37.4 °C), Max:99.4 °F (37.4 °C)    Recent Labs     10/22/20  0041   POCGLU 144*     No intake or output data in the 24 hours ending 10/22/20 0116    Physical Exam  Vitals signs and nursing note reviewed. Constitutional:       General: She is not in acute distress. Appearance: She is well-developed. She is not diaphoretic. HENT:      Head: Normocephalic and atraumatic. Comments: To the best of my exam with patient's poor cooperation     Right Ear: Hearing normal.      Left Ear: Hearing normal.      Nose: Nose normal. No rhinorrhea. Eyes:      General: Lids are normal.      Conjunctiva/sclera: Conjunctivae normal.      Right eye: Right conjunctiva is not injected. Left eye: Left conjunctiva is not injected. Pupils: Pupils are equal, round, and reactive to light. Pupils are equal.      Right eye: Pupil is reactive. Left eye: Pupil is reactive. Comments: Unable to assess EOM visual fields   Neck:      Musculoskeletal: Neck supple. Thyroid: No thyromegaly. Vascular: No carotid bruit. Trachea: Trachea normal. No tracheal deviation. Comments: Would not lie flat to assess meningeal signs  Cardiovascular:      Rate and Rhythm: Regular rhythm. Tachycardia present. Chest Wall: PMI is not displaced. Pulses: Normal pulses. Heart sounds: Normal heart sounds. No murmur. Pulmonary:      Effort: Pulmonary effort is normal. No respiratory distress. Breath sounds: No stridor. Examination of the right-middle field reveals wheezing and rhonchi. Examination of the right-lower field reveals wheezing and rhonchi. Wheezing and rhonchi present. No decreased breath sounds. Chest:      Comments: Very guarded unable to fully examine chest anatomy secondary to lying in a position  Abdominal:      General: Bowel sounds are normal. There is no distension. Palpations: Abdomen is soft. There is no mass. Tenderness: There is no abdominal tenderness. There is no guarding. Musculoskeletal:         General: No tenderness. Comments: She moves all 4 extremities spontaneously but not to command   Skin:     General: Skin is warm and dry. Findings: No erythema, lesion or rash.       Comments: No issues on exposed skin Neurological:      Mental Status: She is not disoriented. GCS: GCS eye subscore is 2. GCS verbal subscore is 2. GCS motor subscore is 3. Comments: Neurological status difficult to assess due to cooperation and lethargy  Left upper arm was difficult to get motor response or sensory response to pain however she moves it left lower extremity responsive with flexion to pain  Right upper and lower extremity responsive to pain   Psychiatric:         Behavior: Behavior is uncooperative and slowed.       Comments: Unable to assess           Investigations:      Laboratory Testing:    CBC auto differential (10/21/2020 5:25 PM EDT)  Only the most recent of 4 results within the time period is included.    CBC auto differential (10/21/2020 5:25 PM EDT)   Component Value Ref Range Performed At Pathologist Signature   White Blood Cells 26.9 (H) 4.0 - 11.0 X10E9/L [facility]     RBC count 2.91 (L) 3.80 - 5.20 X10E12/L [facility]     Hemoglobin 7.6 (L) 11.7 - 15.5 g/dL [facility]     Hematocrit 24.1 (L) 35 - 47 % [facility]     MCV 83 80 - 100 fL [facility]     MCH 26.0 (L) 27 - 34 pg [facility]     MCHC 31.3 (L) 32 - 36 g/dL [facility]     RDW 22.9 (H) 11.5 - 15.0 % [facility]     Platelets 9,357 (HH) 150 - 450 X10E9/L [facility]     MPV 6.6 (L) 7 - 12 fL [facility]     % neutrophils 87.0 % [facility]     % lymphocytes 7.1 % [facility]     % monocytes 4.7 % [facility]     % eosinophils 0.3 % [facility]     % Basophils 0.9 % [facility]     Neutrophils Absolute (A) 23.4 (H) 1.5 - 6.6 X10E9/L [facility]     Lymphocytes Absolute 1.9 1.0 - 3.5 X10E9/L [facility]     Monocytes Absolute 1.3 (H) 0 - 0.9 X10E9/L [facility]     Eosinophils Absolute 0.1 0.0 - 0.4 X10E9/L [facility]     Basophils Absolute 0.2 0.0 - 0.2 X10E9/L [facility]     Anisocytosis 2+ (A) NONE^NONE [facility]     Hypochromia 1+ (A) NONE^NONE [facility]     Polychromasia 1+ (A) NONE^NONE [facility]         Comprehensive metabolic panel (92/41/5212 5:25 PM EDT)  Only the most recent of 5 results within the time period is included.    Comprehensive metabolic panel (97/95/9940 5:25 PM EDT)   Component Value Ref Range Performed At Pathologist Signature   Sodium 143 134 - 146 mmol/L [address] [place] [address] LAB     Potassium, Bld 3.9 3.5 - 5.0 mmol/L [facility] MAIN LAB     Chloride 107 98 - 109 mmol/L [facility] LAB     CO2 24 22 - 32 mmol/L [facility] LAB     Anion gap 12 5 - 15 mmol/L [facility] LAB     BUN 7 5 - 23 mg/dL [facility] LAB     Creatinine 0.43Comment: METHOD TRACEABLE TO IDMS STANDARD 0.40 - 1.00 mg/dL [facility] LAB     Glucose 133 (H) 65 - 99 mg/dL [facility] LAB     Calcium 9.2 8.5 - 10.5 mg/dL [facility] LAB     Total Protein 7.2 6.0 - 8.0 g/dL [facility] LAB     Albumin 3.2 3.2 - 5.3 g/dL [facility] LAB     Alkaline Phosphatase 132 (H) 39 - 130 U/L [facility] LAB     AST 11 0 - 41 U/L [facility] LAB     ALT 9 0 - 31 U/L [address] [place] [address] LAB     Total bilirubin 0.4 0.3 - 1.2 mg/dL [address] LAB     GFR MDRD Non Af Amer >60 >59 ml/min/1.73sq.m [address] LAB     GFR MDRD Af Amer >60 >59 ml/min/1.73sq.m [address] LAB                  Troponin I (10/21/2020 5:25 PM EDT)  Troponin I (10/21/2020 5:25 PM EDT)   Component Value Ref Range Performed At Pathologist Signature   Troponin I <0.01 0.00 - 0.04 ng/mL [address] LAB       Troponin I (10/21/2020 5:25 PM EDT)   Specimen   Blood - Serum     Troponin I (10/21/2020 5:25 PM EDT)   Performing Organization Address City/State/ZIP Code Phone Number   [facility]   [address] [facility]  [address]  [facility], [address]       Back to top of Results       Magnesium (10/21/2020 5:25 PM EDT)  Only the most recent of 5 results within the time period is included.    Magnesium (10/21/2020 5:25 PM EDT)   Component Value Ref Range Performed At Pathologist Signature   Magnesium 1.7 (L) 1.8 - 2.6 mg/dL [address] LAB       Magnesium (10/21/2020 5:25 PM EDT)   Specimen   Blood - Serum     Magnesium (10/21/2020 5:25 PM EDT)   Performing Organization Address City/[facility]/ZIP Code Phone Number   [id number]       [facility] MAIN LAB   [address] [facility], [facility] [address]       Back to top of Results       Ammonia (10/21/2020 5:25 PM EDT)  Ammonia (10/21/2020 5:25 PM EDT)   Component Value Ref Range Performed At Pathologist Signature   Ammonia 15 11 - 35 umol/L [address] LAB       Ammonia (10/21/2020 5:25 PM EDT)   Specimen   Blood - Serum     Ammonia (10/21/2020 5:25 PM EDT)   Performing Organization Address City/[facility] Code Phone Number   [address] [facility] MAIN LAB   [address] [facility], [address] [place] [address] St       Back to top of Results       Ethanol (10/21/2020 5:25 PM EDT)  Ethanol (10/21/2020 5:25 PM EDT)   Component Value Ref Range Performed At Pathologist Signature   Ethanol Lvl <0.01  Comment:        This report is intended for use in clinical  monitoring or management of patients.  It is  not for use in legal or employment-related  testing. 0.00 - 0.08 g/dL [address]                              Imaging/Diagnostics:    CT angiogram of the brain ([place])  Findings:  CT ANGIOGRAM OF THE [place] [facility] [place]. There is no aneurysm or major vessel occlusion of the [place]. Flow is demonstrated within the vertebral arteries, basilar artery, and bilateral posterior cerebral arteries, middle cerebral arteries and anterior cerebral arteries. IMPRESSION:        1. Negative CT angiogram [place]. CTA carotids    Findings:  Within the limitations of CT angiography, the vessels are normal in course and caliber with no aneurysm, dissection, or occlusion. No stenosis of the internal carotid artery relative to the distal internal carotid artery diameter utilizing NASCET criteria.         Flow is demonstrated within bilateral vertebral arteries, and bilateral internal, external, and common carotid arteries. Bovine arch. Minimal atherosclerotic plaque the carotid bulbs. IMPRESSION:        1. Essentially negative CT angiogram cervical portion of the carotid arteries. Brain CT without contrast, no prior studies. FINDINGS: There is no hemorrhage. There is no focal swelling, edema, mass or mass effect. There is no hydrocephalus or extra-axial fluid accumulation and there is no evidence of an acute infarction. The calvarium is intact and the visualized paranasal sinuses mastoids and middle ears are clear. IMPRESSION: Negative brain CT. Assessment :      Hospital Problems           Last Modified POA    * (Principal) New onset seizure ([address] 10/22/2020 Yes    Malignant neoplasm of ovary ([address] 10/22/2020 Yes    Type 2 diabetes mellitus without complication, without long-term current use of insulin ([address] 10/22/2020 Yes    Anemia 10/22/2020 Yes    Splenic lesion 10/22/2020 Yes    Ovarian cancer ([address] 10/22/2020 Yes    Overview Signed 10/22/2020  1:05 AM by ESTRELLITA Germain CNP     low grade serous ovarian carcinoma               Plan:     Patient status inpatient in the Progressive Unit/Step down    1. Consultation to neurology,   2. Consideration for lumbar puncture with interventional radiology  3. Obtain MRI of the brain  4. Consider consultation to heme-onc  5. Consider consultation to palliative care  6. Obtain EEG  7. Continue with Keppra  8. Continue with acyclovir and ceftriaxone for the concern of meningitis  9. Seizure precautions  10. IV Ativan as needed for seizure lasting more than 3 minutes  11. Insulin sliding scale, hypoglycemia protocol  12. Monitor hemoglobin  13. GI prophylaxis  14.  DVT prophylaxis with EPC cuffs holding chemical anticoagulation at this time for the potential procedure of a lumbar puncture, will resume when neurology clears for chemical anticoagulation however will need close monitoring of platelets and H&H    Consultations:   IP CONSULT TO NEUROLOGY  PHARMACY TO DOSE VANCOMYCIN     Patient is admitted as inpatient status because of co-morbidities listed above, severity of signs and symptoms as outlined, requirement for current medical therapies and most importantly because of direct risk to patient if care not provided in a hospital setting. Expected length of stay > 48 hours. ESTRELLITA Paniagua - CNP  10/22/2020  1:16 AM    Copy sent to Dr. Ramos primary care provider on file.

## 2020-10-23 ENCOUNTER — INITIAL CONSULT (OUTPATIENT)
Dept: ONCOLOGY | Age: 55
End: 2020-10-23
Payer: COMMERCIAL

## 2020-10-23 ENCOUNTER — HOSPITAL ENCOUNTER (OUTPATIENT)
Dept: INFUSION THERAPY | Age: 55
Discharge: HOME | End: 2020-10-23
Payer: COMMERCIAL

## 2020-10-23 ENCOUNTER — APPOINTMENT (OUTPATIENT)
Dept: MRI IMAGING | Age: 55
DRG: 071 | End: 2020-10-23
Attending: INTERNAL MEDICINE
Payer: COMMERCIAL

## 2020-10-23 LAB
ALBUMIN SERPL-MCNC: 3.1 G/DL (ref 3.5–5.2)
ALBUMIN/GLOBULIN RATIO: 0.9 (ref 1–2.5)
ALP BLD-CCNC: 126 U/L (ref 35–104)
ALT SERPL-CCNC: 5 U/L (ref 5–33)
ANION GAP SERPL CALCULATED.3IONS-SCNC: 14 MMOL/L (ref 9–17)
AST SERPL-CCNC: 10 U/L
BILIRUB SERPL-MCNC: <0.1 MG/DL (ref 0.3–1.2)
BUN BLDV-MCNC: 4 MG/DL (ref 6–20)
BUN/CREAT BLD: ABNORMAL (ref 9–20)
CALCIUM SERPL-MCNC: 8.8 MG/DL (ref 8.6–10.4)
CHLORIDE BLD-SCNC: 100 MMOL/L (ref 98–107)
CO2: 22 MMOL/L (ref 20–31)
CREAT SERPL-MCNC: 0.4 MG/DL (ref 0.5–0.9)
CRYPTOCOCCAL ANTIGEN CSF: NEGATIVE
CRYPTOCOCCUS NEOFORMANS/GATTI CSF FILM ARR.: NOT DETECTED
CYTOMEGALOVIRUS (CMV) CSF FILM ARRAY: NOT DETECTED
ENTEROVIRUS CSF FILM ARRAY: NOT DETECTED
ESCHERICHIA COLI K1 CSF FILM ARRAY: NOT DETECTED
GFR AFRICAN AMERICAN: >60 ML/MIN
GFR NON-AFRICAN AMERICAN: >60 ML/MIN
GFR SERPL CREATININE-BSD FRML MDRD: ABNORMAL ML/MIN/{1.73_M2}
GFR SERPL CREATININE-BSD FRML MDRD: ABNORMAL ML/MIN/{1.73_M2}
GLUCOSE BLD-MCNC: 153 MG/DL (ref 70–99)
HAEMOPHILUS INFLUENZA CSF FILM ARRAY: NOT DETECTED
HCT VFR BLD CALC: 33.7 % (ref 36.3–47.1)
HEMOGLOBIN: 9.8 G/DL (ref 11.9–15.1)
HHV-6 (HERPESVIRUS 6) CSF FILM ARRAY: NOT DETECTED
HSV-1 CSF FILM ARRAY: NOT DETECTED
HSV-2 CSF FILM ARRAY: NOT DETECTED
INR BLD: 1
LISTERIA MONOCYTOGENES CSF FILM ARRAY: NOT DETECTED
MCH RBC QN AUTO: 25.3 PG (ref 25.2–33.5)
MCHC RBC AUTO-ENTMCNC: 29.1 G/DL (ref 28.4–34.8)
MCV RBC AUTO: 86.9 FL (ref 82.6–102.9)
NEISSERIA MENIGITIDIS CSF FILM ARRAY: NOT DETECTED
NRBC AUTOMATED: 0 PER 100 WBC
PARECHOVIRUS CSF FILM ARRAY: NOT DETECTED
PDW BLD-RTO: 21.1 % (ref 11.8–14.4)
PLATELET # BLD: 741 K/UL (ref 138–453)
PMV BLD AUTO: 9.1 FL (ref 8.1–13.5)
POTASSIUM SERPL-SCNC: 3.7 MMOL/L (ref 3.7–5.3)
PROTHROMBIN TIME: 11 SEC (ref 9–12)
RBC # BLD: 3.88 M/UL (ref 3.95–5.11)
SODIUM BLD-SCNC: 136 MMOL/L (ref 135–144)
SPECIMEN DESCRIPTION: NORMAL
STREPTOCOCCUS AGALACTIAE CSF FILM ARRAY: NOT DETECTED
STREPTOCOCCUS PNEUMONIAE CSF FILM ARRAY: NOT DETECTED
TOTAL PROTEIN: 6.7 G/DL (ref 6.4–8.3)
VANCOMYCIN TROUGH DATE LAST DOSE: NORMAL
VANCOMYCIN TROUGH DOSE AMOUNT: NORMAL
VANCOMYCIN TROUGH TIME LAST DOSE: NORMAL
VANCOMYCIN TROUGH: 11.9 UG/ML (ref 10–20)
VARICELLA-ZOSTER CSF FILM ARRAY: NOT DETECTED
WBC # BLD: 22 K/UL (ref 3.5–11.3)

## 2020-10-23 PROCEDURE — 6370000000 HC RX 637 (ALT 250 FOR IP): Performed by: PHYSICIAN ASSISTANT

## 2020-10-23 PROCEDURE — 6370000000 HC RX 637 (ALT 250 FOR IP): Performed by: FAMILY MEDICINE

## 2020-10-23 PROCEDURE — 36415 COLL VENOUS BLD VENIPUNCTURE: CPT

## 2020-10-23 PROCEDURE — 6370000000 HC RX 637 (ALT 250 FOR IP): Performed by: NURSE PRACTITIONER

## 2020-10-23 PROCEDURE — 95720 EEG PHY/QHP EA INCR W/VEEG: CPT | Performed by: PSYCHIATRY & NEUROLOGY

## 2020-10-23 PROCEDURE — 94761 N-INVAS EAR/PLS OXIMETRY MLT: CPT

## 2020-10-23 PROCEDURE — 2580000003 HC RX 258: Performed by: INTERNAL MEDICINE

## 2020-10-23 PROCEDURE — 87040 BLOOD CULTURE FOR BACTERIA: CPT

## 2020-10-23 PROCEDURE — 99232 SBSQ HOSP IP/OBS MODERATE 35: CPT | Performed by: INTERNAL MEDICINE

## 2020-10-23 PROCEDURE — 2580000003 HC RX 258: Performed by: NURSE PRACTITIONER

## 2020-10-23 PROCEDURE — 6360000002 HC RX W HCPCS: Performed by: NURSE PRACTITIONER

## 2020-10-23 PROCEDURE — 70551 MRI BRAIN STEM W/O DYE: CPT

## 2020-10-23 PROCEDURE — 87483 CNS DNA AMP PROBE TYPE 12-25: CPT

## 2020-10-23 PROCEDURE — 6360000002 HC RX W HCPCS: Performed by: FAMILY MEDICINE

## 2020-10-23 PROCEDURE — 6360000002 HC RX W HCPCS: Performed by: INTERNAL MEDICINE

## 2020-10-23 PROCEDURE — 99233 SBSQ HOSP IP/OBS HIGH 50: CPT | Performed by: PSYCHIATRY & NEUROLOGY

## 2020-10-23 PROCEDURE — 85610 PROTHROMBIN TIME: CPT

## 2020-10-23 PROCEDURE — 2060000000 HC ICU INTERMEDIATE R&B

## 2020-10-23 PROCEDURE — 6360000002 HC RX W HCPCS: Performed by: STUDENT IN AN ORGANIZED HEALTH CARE EDUCATION/TRAINING PROGRAM

## 2020-10-23 PROCEDURE — 99254 IP/OBS CNSLTJ NEW/EST MOD 60: CPT | Performed by: INTERNAL MEDICINE

## 2020-10-23 PROCEDURE — 80202 ASSAY OF VANCOMYCIN: CPT

## 2020-10-23 PROCEDURE — 85027 COMPLETE CBC AUTOMATED: CPT

## 2020-10-23 PROCEDURE — 80053 COMPREHEN METABOLIC PANEL: CPT

## 2020-10-23 RX ORDER — LORAZEPAM 2 MG/ML
0.5 INJECTION INTRAMUSCULAR ONCE
Status: DISCONTINUED | OUTPATIENT
Start: 2020-10-23 | End: 2020-10-25

## 2020-10-23 RX ORDER — LORAZEPAM 2 MG/ML
2 INJECTION INTRAMUSCULAR ONCE
Status: DISCONTINUED | OUTPATIENT
Start: 2020-10-23 | End: 2020-10-29 | Stop reason: HOSPADM

## 2020-10-23 RX ORDER — AMLODIPINE BESYLATE 5 MG/1
5 TABLET ORAL DAILY
Status: DISCONTINUED | OUTPATIENT
Start: 2020-10-23 | End: 2020-10-25

## 2020-10-23 RX ORDER — HALOPERIDOL 5 MG/ML
5 INJECTION INTRAMUSCULAR
Status: ACTIVE | OUTPATIENT
Start: 2020-10-23 | End: 2020-10-23

## 2020-10-23 RX ORDER — MIDAZOLAM HYDROCHLORIDE 1 MG/ML
2 INJECTION INTRAMUSCULAR; INTRAVENOUS
Status: COMPLETED | OUTPATIENT
Start: 2020-10-23 | End: 2020-10-23

## 2020-10-23 RX ORDER — LEVETIRACETAM 10 MG/ML
1000 INJECTION INTRAVASCULAR EVERY 12 HOURS
Status: DISCONTINUED | OUTPATIENT
Start: 2020-10-23 | End: 2020-10-25

## 2020-10-23 RX ORDER — LEVETIRACETAM 10 MG/ML
1000 INJECTION INTRAVASCULAR EVERY 12 HOURS
Status: DISCONTINUED | OUTPATIENT
Start: 2020-10-23 | End: 2020-10-23

## 2020-10-23 RX ORDER — LORAZEPAM 0.5 MG/1
0.5 TABLET ORAL ONCE
Status: COMPLETED | OUTPATIENT
Start: 2020-10-23 | End: 2020-10-23

## 2020-10-23 RX ORDER — LABETALOL HYDROCHLORIDE 5 MG/ML
5 INJECTION, SOLUTION INTRAVENOUS EVERY 6 HOURS PRN
Status: DISCONTINUED | OUTPATIENT
Start: 2020-10-23 | End: 2020-10-29 | Stop reason: HOSPADM

## 2020-10-23 RX ORDER — LORAZEPAM 2 MG/ML
0.5 INJECTION INTRAMUSCULAR ONCE
Status: COMPLETED | OUTPATIENT
Start: 2020-10-23 | End: 2020-10-23

## 2020-10-23 RX ORDER — KETOROLAC TROMETHAMINE 30 MG/ML
15 INJECTION, SOLUTION INTRAMUSCULAR; INTRAVENOUS ONCE
Status: COMPLETED | OUTPATIENT
Start: 2020-10-23 | End: 2020-10-23

## 2020-10-23 RX ADMIN — CEFTRIAXONE SODIUM 2 G: 2 INJECTION, POWDER, FOR SOLUTION INTRAMUSCULAR; INTRAVENOUS at 13:21

## 2020-10-23 RX ADMIN — CEFTRIAXONE SODIUM 2 G: 2 INJECTION, POWDER, FOR SOLUTION INTRAMUSCULAR; INTRAVENOUS at 01:30

## 2020-10-23 RX ADMIN — LEVETIRACETAM 1000 MG: 10 INJECTION INTRAVENOUS at 05:13

## 2020-10-23 RX ADMIN — LORAZEPAM 0.5 MG: 2 INJECTION INTRAMUSCULAR; INTRAVENOUS at 07:06

## 2020-10-23 RX ADMIN — MIDAZOLAM HYDROCHLORIDE 1 MG: 1 INJECTION, SOLUTION INTRAMUSCULAR; INTRAVENOUS at 10:13

## 2020-10-23 RX ADMIN — VANCOMYCIN HYDROCHLORIDE 1500 MG: 10 INJECTION, POWDER, LYOPHILIZED, FOR SOLUTION INTRAVENOUS at 15:30

## 2020-10-23 RX ADMIN — LORAZEPAM 0.5 MG: 0.5 TABLET ORAL at 21:20

## 2020-10-23 RX ADMIN — VANCOMYCIN HYDROCHLORIDE 1500 MG: 10 INJECTION, POWDER, LYOPHILIZED, FOR SOLUTION INTRAVENOUS at 02:02

## 2020-10-23 RX ADMIN — ACYCLOVIR SODIUM 600 MG: 50 INJECTION, SOLUTION INTRAVENOUS at 04:06

## 2020-10-23 RX ADMIN — ACYCLOVIR SODIUM 600 MG: 50 INJECTION, SOLUTION INTRAVENOUS at 11:53

## 2020-10-23 RX ADMIN — AMLODIPINE BESYLATE 5 MG: 5 TABLET ORAL at 17:47

## 2020-10-23 RX ADMIN — ACETAMINOPHEN 650 MG: 650 SUPPOSITORY RECTAL at 01:45

## 2020-10-23 RX ADMIN — ENOXAPARIN SODIUM 40 MG: 40 INJECTION SUBCUTANEOUS at 11:53

## 2020-10-23 RX ADMIN — LEVETIRACETAM 1000 MG: 10 INJECTION INTRAVENOUS at 17:25

## 2020-10-23 RX ADMIN — ACYCLOVIR SODIUM 600 MG: 50 INJECTION, SOLUTION INTRAVENOUS at 19:52

## 2020-10-23 RX ADMIN — KETOROLAC TROMETHAMINE 15 MG: 30 INJECTION, SOLUTION INTRAMUSCULAR; INTRAVENOUS at 05:38

## 2020-10-23 RX ADMIN — ACETAMINOPHEN 650 MG: 325 TABLET ORAL at 20:56

## 2020-10-23 RX ADMIN — SODIUM CHLORIDE, PRESERVATIVE FREE 10 ML: 5 INJECTION INTRAVENOUS at 11:54

## 2020-10-23 ASSESSMENT — PAIN SCALES - GENERAL: PAINLEVEL_OUTOF10: 3

## 2020-10-23 NOTE — PROGRESS NOTES
[facility]  Office: [address], DO, Ramiro Blackr, DO, Damion Narrow, DO, Rajan Greenfield Blood, DO, Jolanta Clark MD, Davis Vernon MD, Julio Jack MD, Rupinder Travis MD, Della Gonzalez MD, Ivett Toure MD, Phillip Romero MD, Bridget Cooper MD, Mena Aquino MD, Cari Beck, DO, Cristo Osei MD, Henry Morelos MD, Chester Rehman, DO, Ion Padilla MD,  Omkar Gusman, DO, Sonia Armas MD, Terry Crocker MD, Arabella Hill, [facility], [facility] Lamb, CNP, Jim Hendrix, CNP, Laura Councilman, CNS, Tyler Lr, CNP, Nichole Guaman, CNP, Leticia Lepe, CNP, Anny Cox, CNP, Dereje Govea, CNP, Delmar Reis PA-C, Opal Harrison, [facility] ([place], [place]), Sukumar Donnelly, CNP, Cheryle Child, CNP, Negro Starch, CNP, Belgica Copper, CNP, Blanka Patel, CNP         [facility]   [address] [facility] [facility]    Progress Note    10/23/2020    10:26 AM    Name:   Izzy Mohan  MRN:     [id number]     Acct:      [de-identified]   Room:   4478/6970-58   Day:  1  Admit Date:  10/22/2020 12:20 AM    PCP:   No primary care provider on file. Code Status:  Full Code    Subjective:     C/C: AMS     Interval History Status: improved. No issues overnight  Patient more awake/alert today  Oriented to person/place  Daughter at bedside, patient able to recognize daughter  MRI demonstrating CVA  Discussed with patient/family      Brief History:     60 F presented with seizure episode. Patient currently lethargic, not cooperating with questions. Answering intermittently, disoriented to place. On chart review initially presented to [facility] with confusion. Noted to have seizure episode, no prior history of seizures. Started on antibiotics, transferred to [facility] for further neurological workup.      Review of Systems:     Constitutional:  negative for chills, fevers, sweats  Respiratory:  negative for cough, dyspnea on exertion, shortness of breath  Cardiovascular:  negative for chest pain, chest pressure/discomfort  Gastrointestinal:  negative for abdominal pain, constipation, diarrhea, nausea, vomiting  Neurological:  negative for dizziness, headache    Medications: Allergies:  No Known Allergies    Current Meds:   Scheduled Meds:    levetiracetam  1,000 mg Intravenous Q12H    LORazepam  0.5 mg Intravenous Once    cefTRIAXone (ROCEPHIN) IV  2 g Intravenous Q12H    enoxaparin  40 mg Subcutaneous Daily    sodium chloride flush  10 mL Intravenous 2 times per day    vancomycin (VANCOCIN) intermittent dosing (placeholder)   Other RX Placeholder    vancomycin  1,500 mg Intravenous Q12H    acyclovir  10 mg/kg (Ideal) Intravenous Q8H    lidocaine PF  5 mL Intradermal Once    LORazepam  2 mg Intravenous Once     Continuous Infusions:    sodium chloride 75 mL/hr at 10/22/20 7864     PRN Meds: haloperidol lactate, [DISCONTINUED] acetaminophen **OR** acetaminophen, LORazepam, nicotine, polyethylene glycol, promethazine **OR** ondansetron, sodium chloride flush, acetaminophen    Data:     Past Medical History:   has a past medical history of Anemia, Bleeding, Cervical cancer ([facility] Depression, Diabetes mellitus ([facility] GERD (gastroesophageal reflux disease), Metastatic cancer ([facility] Ovarian cancer ([facility] Post chemo evaluation, and Splenic lesion. Social History:   reports that she has been smoking. She has been smoking about 1.00 pack per day. She uses smokeless tobacco. She reports previous alcohol use. She reports that she does not use drugs. Family History:   Family History   Problem Relation Age of Onset    Alcohol Abuse Mother     Cirrhosis Mother        Vitals:  BP (!) 137/55   Pulse 80   Temp 100.4 °F (38 °C) (Rectal)   Resp 18   Ht 5' 6\" (1.676 m)   Wt 192 lb 10.9 oz (87.4 kg)   SpO2 93%   BMI 31.10 kg/m²   Temp (24hrs), Av.4 °F (38 °C), Min:99.1 °F (37.3 °C), Max:101.7 °F (38.7 °C)    Recent Labs     10/22/20  0041   POCGLU 144*       I/O (24Hr):     Intake/Output Summary (Last 24 hours) at 10/23/2020 1026  Last data filed at 10/23/2020 0655  Gross per 24 hour   Intake 3257 ml   Output --   Net 3257 ml       Labs:  Hematology:  Recent Labs     10/22/20  0939 10/23/20  0559   WBC 24.2* 22.0*   RBC 3.88* 3.88*   HGB 10.0* 9.8*   HCT 32.4* 33.7*   MCV 83.5 86.9   MCH 25.8 25.3   MCHC 30.9 29.1   RDW 21.2* 21.1*   * 741*   MPV 8.8 9.1   CRP 52.2*  --    INR 1.0 1.0     Chemistry:  Recent Labs     10/22/20  0939 10/23/20  0559   * 136   K 3.4* 3.7    100   CO2 20 22   GLUCOSE 107* 153*   BUN 4* 4*   CREATININE 0.40* 0.40*   ANIONGAP 12 14   LABGLOM >60 >60   GFRAA >60 >60   CALCIUM 8.7 8.8     Recent Labs     10/22/20  0041 10/22/20  0939 10/22/20  1605 10/23/20  0559   PROT  --  6.5  --  6.7   LABALBU  --  3.1* 3.3* 3.1*   TSH  --  0.93  --   --    AST  --  9  --  10   ALT  --  5  --  5   ALKPHOS  --  134*  --  126*   BILITOT  --  <0.10*  --  <0.10*   AMMONIA  --  63*  --   --    POCGLU 144*  --   --   --      ABG:No results found for: POCPH, PHART, PH, POCPCO2, VPZ6ZUY, PCO2, POCPO2, PO2ART, PO2, POCHCO3, WMF2CYZ, HCO3, NBEA, PBEA, BEART, BE, THGBART, THB, HOJ8QEC, TJUD3MYW, K8MCOGAZ, O2SAT, FIO2  Lab Results   Component Value Date/Time    SPECIAL RIGHT HAND AMOUNT UNKNOWN 10/23/2020 01:08 AM    SPECIAL LT HAND AMOUNT UNKNOWN 10/23/2020 01:08 AM     Lab Results   Component Value Date/Time    CULTURE NO GROWTH 4 HOURS 10/23/2020 01:08 AM    CULTURE NO GROWTH 4 HOURS 10/23/2020 01:08 AM       Radiology:  Ir Lumbar Puncture For Diagnosis    Result Date: 10/22/2020  Successful fluoroscopic-guided lumbar puncture.      Mri Limited Brain    Result Date: 10/23/2020  Limited MRI of the brain demonstrates moderate acute ischemia involving both occipital lobes and left parietal lobe involving the cortices Small acute punctate infarct within the posterior left frontal lobe near the convexity Hyperintense FLAIR signal mildly within the cortices of both frontal lobes and posterior left temporal lobe may represent subacute ischemia or inflammatory process such as vasculitis.   Posterior reversible leukoencephalopathy is a possibility       Physical Examination:        General appearance:  alert, cooperative and no distress  Mental Status:  oriented to person, intermittently confused   Lungs:  clear to auscultation bilaterally, normal effort  Heart:  regular rate and rhythm  Abdomen:  soft, nontender, nondistended, normal bowel sounds  Extremities:  no edema, redness, tenderness in the calves  Skin:  no gross lesions, rashes, induration    Assessment:        Hospital Problems           Last Modified POA    * (Principal) New onset seizure ([address] 10/22/2020 Yes    Malignant neoplasm of ovary ([address] 10/22/2020 Yes    Type 2 diabetes mellitus without complication, without long-term current use of insulin ([address] 10/22/2020 Yes    Anemia 10/22/2020 Yes    Splenic lesion 10/22/2020 Yes    Ovarian cancer ([address] 10/22/2020 Yes    Overview Signed 10/22/2020  1:05 AM by ESTRELLITA Juan - CNP     low grade serous ovarian carcinoma         Acute encephalopathy 10/22/2020 Yes          Plan:        - Neurology consulted   - Check MRI - demonstrating CVA  - LTME per neurology  - LP per IR - negative   - Continue empiric antibiotics for now  - Seizure precautions   - Neuro checks  - Monitor glucose, continue insulin correction   - Discussed with daughter at bedside   - Discussed with Dr Brinton Landau, recommending oncology/ID evaluation       Roger Kenney MD  10/23/2020  10:26 AM

## 2020-10-23 NOTE — PROGRESS NOTES
[address]  Occupational Therapy Not Seen Note     Patient not available for Occupational Therapy due to:     [] Testing:     [] Hemodialysis     [] Blood Transfusion in Progress     []Refusal by Patient:      [] Surgery/Procedure:     [] Strict Bedrest     [] Sedation     [] Spine Precautions      [] Pt being transferred to palliative care at this time. Spoke with pt/family and OT services to be defered.     [] Pt independent with functional mobility and functional tasks. Pt with no OT acute care needs at this time, will defer OT eval.     [x] Other- cx, just given ativan and not appropriate for OT evaluation at this time.     Next Scheduled Treatment: 10/24/2020     Signature: ARTI More/JOAQUÍN

## 2020-10-23 NOTE — PROGRESS NOTES
Patient agitated and moving around a lot, patient removed pete needle from chest port and PIV. Patient currently has no IV access, Marshal Chapa CNP was notified of this and switched IV ativan to IM ativan 1x dose for agitation. Pt has also removed quite a few electrodes for LTME throughout the night. EEG was notified of this and replied for RN to wait to see if Neurology still wants it on patient before they reattach electrodes.

## 2020-10-23 NOTE — PLAN OF CARE
Problem: Skin Integrity:  Goal: Absence of new skin breakdown  Description: Absence of new skin breakdown  Outcome: Met This Shift     Problem: Falls - Risk of:  Goal: Will remain free from falls  Description: Will remain free from falls  Outcome: Met This Shift  Goal: Absence of physical injury  Description: Absence of physical injury  Outcome: Met This Shift     Problem: Physical Regulation:  Goal: Signs of adequate cerebral perfusion will increase  Description: Signs of adequate cerebral perfusion will increase  Outcome: Met This Shift     Problem: Safety:  Goal: Ability to remain free from injury will improve  Description: Ability to remain free from injury will improve  Outcome: Met This Shift     Problem: Coping:  Goal: Ability to cope will improve  Description: Ability to cope will improve  Outcome: Ongoing  Goal: Ability to identify appropriate support needs will improve  Description: Ability to identify appropriate support needs will improve  Outcome: Ongoing     Problem: Health Behavior:  Goal: Ability to manage health-related needs will improve  Description: Ability to manage health-related needs will improve  Outcome: Ongoing     Problem: Physical Regulation:  Goal: Ability to maintain a stable neurologic state will improve  Description: Ability to maintain a stable neurologic state will improve  Outcome: Ongoing     Problem: Self-Concept:  Goal: Level of anxiety will decrease  Description: Level of anxiety will decrease  Outcome: Ongoing     Problem: Skin Integrity:  Goal: Will show no infection signs and symptoms  Description: Will show no infection signs and symptoms  Outcome: Not Met This Shift     Problem: Self-Concept:  Goal: Ability to verbalize feelings about condition will improve  Description: Ability to verbalize feelings about condition will improve  Outcome: Not Met This Shift

## 2020-10-23 NOTE — CARE COORDINATION
Case Management Initial Discharge Plan  [facility],             Met with:patient to discuss discharge plans. Information verified: address, contacts, phone number, , insurance Yes    Emergency Contact/Next of Kin name & number: Daughter Ki Fernandez [phone]    PCP: No primary care provider on file. Date of last visit: Sees  [address]  Regularly     Insurance Provider: vashti    Discharge Planning    Living Arrangements:  Children   Support Systems:  [id number] has 1 stories  few stairs to climb to get into front door, 0stairs to climb to reach second floor  Location of bedroom/bathroom in home main    Patient able to perform ADL's:Independent    Current Services (outpatient & in home) none   DME equipment: none   DME provider: n/a    Receiving oral anticoagulation therapy? No    If indicated:   Physician managing anticoagulation treatment:   Where does patient obtain lab work for ATC treatment? Potential Assistance Needed:  N/A    Patient agreeable to home care: unsure  [place] of choice provided:  n/a    Prior SNF/Rehab Placement and Facility:   Agreeable to SNF/Rehab: No  [place] of choice provided: n/a     Evaluation: no    Expected Discharge date:       Patient expects to be discharged to: Follow Up Appointment: Best Day/ Time: Monday PM    Transportation provider: family  Transportation arrangements needed for discharge: Yes    Readmission Risk              Risk of Unplanned Readmission:        12             Does patient have a readmission risk score greater than 14?: No  If yes, follow-up appointment must be made within 7 days of discharge.      Goals of Care: safety       Discharge Plan: home          Electronically signed by Pinto RN on 10/23/20 at 5:13 PM EDT

## 2020-10-23 NOTE — CONSULTS
[facility] - Initial Consult Note  Today's Date and Time: 10/23/2020, 5:31 PM    Impression :   · New onset seizure  · Metabolic encephalopathy  · Hyponatremia  · Diabetes mellitus type 2  · Anemia  · Splenic lesion s/p IR embolization   · Ovarian cancer (2005) with multiple recurrences   · Metastatic ovarian cancer to abdomen, liver, lungs, spleen  · Leukocytosis   · No bacterial meningitis or viral encephalitis    Recommendations:   · Monitor off antibiotics  · D/c acyclovir    Medical Decision Making/Summary/Discussion:10/23/2020     · Patient with metastatic ovarian carcinoma  · Admitted post seizure  · Hyponatremic at that time. Hyponatremia since corrected  · Concern raised for infection because of leucocytosis, abnormalities in MRI. · The latter are probably vascular in nature, perhaps related to seizure. · CSF and clinical exam do not support bacterial meningitis or viral/parasitic encephalitis. · Will Monitor off antibiotics   Infection Control Recommendations   · [place] Precautions    Antimicrobial Stewardship Recommendations     · Discontinuation of therapy  Coordination of Outpatient Care:   · Estimated Length of IV antimicrobials:10-23-20  · Patient will need Midline Catheter Insertion: no  · Patient will need PICC line Insertion:no  · Patient will need: Home IV , [place],  SNF,  LTAC: TBD  · Patient will need outpatient wound care:No    Chief complaint/reason for consultation:   · Fever, leukocytosis     History of Present Illness:   Carmen Raymundo is a 60y.o.-year-old  female who was initially admitted on 10/22/2020. Patient seen at the request of Dr. Sima Hunter. INITIAL HISTORY:     Patient presented to [address] [place] [address] with concerns of confusion after receiving influenza and pneumonia pneumococcal vaccines. Per nursing report patient had an episode of a seizure in the emergency room at [facility].  At that time she was loaded with file     Minutes per session: Not on file    Stress: Not on file   Relationships    Social connections     Talks on phone: Not on file     Gets together: Not on file     Attends Church service: Not on file     Active member of club or organization: Not on file     Attends meetings of clubs or organizations: Not on file     Relationship status: Not on file    Intimate partner violence     Fear of current or ex partner: Not on file     Emotionally abused: Not on file     Physically abused: Not on file     Forced sexual activity: Not on file   Other Topics Concern    Not on file   Social History Narrative    Not on file       Family History:     Family History   Problem Relation Age of Onset    Alcohol Abuse Mother     Cirrhosis Mother         Allergies:   Patient has no known allergies. Review of Systems:   Constitutional: No fevers or chills. No systemic complaints  Head: No headaches  Eyes: No double vision or blurry vision. No conjunctival inflammation. ENT: No sore throat or runny nose. No hearing loss, tinnitus or vertigo. Cardiovascular: No chest pain or palpitations. No shortness of breath. No CONTRERAS  Lung: No shortness of breath or cough. No sputum production  Abdomen: No nausea, vomiting, diarrhea, or abdominal pain. No cramps. Genitourinary: No increased urinary frequency, or dysuria. No hematuria. No suprapubic or CVA pain  Musculoskeletal: No muscle aches or pains. No joint effusions, swelling or deformities  Hematologic: No bleeding or bruising. Neurologic: No headache, weakness, numbness, or tingling. Seizure x 1  Integument: No rash, no ulcers. Psychiatric: No depression. Endocrine: No polyuria, no polydipsia, no polyphagia.     Physical Examination :     Patient Vitals for the past 8 hrs:   BP Temp Temp src Pulse Resp   10/23/20 1156 (!) 163/70 98.2 °F (36.8 °C) Oral 87 19     General Appearance: Awake, alert, and in no apparent distress  Head:  Normocephalic, no trauma  Eyes: Pupils equal, round, reactive to light and accommodation; extraocular movements intact; sclera anicteric; conjunctivae pink. No embolic phenomena. ENT: Oropharynx clear, without erythema, exudate, or thrush. No tenderness of sinuses. Mouth/throat: mucosa pink and moist. No lesions. Dentition in good repair. Neck:Supple, without lymphadenopathy. Thyroid normal, No bruits. Pulmonary/Chest: Clear to auscultation, without wheezes, rales, or rhonchi. No dullness to percussion. Cardiovascular: Regular rate and rhythm without murmurs, rubs, or gallops. Abdomen: Soft, non tender. Bowel sounds normal. No organomegaly  All four Extremities: No cyanosis, clubbing, edema, or effusions. Neurologic: Able to move extremities, follows all commands  Skin: Warm and dry with good turgor. No signs of peripheral arterial or venous insufficiency. No ulcerations. No open wounds. Medical Decision Making -Laboratory:   I have independently reviewed/ordered the following labs:    CBC with Differential:   Recent Labs     10/22/20  0939 10/23/20  0559   WBC 24.2* 22.0*   HGB 10.0* 9.8*   HCT 32.4* 33.7*   * 741*   LYMPHOPCT 10*  --    MONOPCT 5  --      BMP:   Recent Labs     10/22/20  0939 10/23/20  0559   * 136   K 3.4* 3.7    100   CO2 20 22   BUN 4* 4*   CREATININE 0.40* 0.40*     Hepatic Function Panel:   Recent Labs     10/22/20  0939 10/22/20  1605 10/23/20  0559   PROT 6.5  --  6.7   LABALBU 3.1* 3.3* 3.1*   BILITOT <0.10*  --  <0.10*   ALKPHOS 134*  --  126*   ALT 5  --  5   AST 9  --  10     No results for input(s): RPR in the last 72 hours. No results for input(s): HIV in the last 72 hours. No results for input(s): BC in the last 72 hours.   Lab Results   Component Value Date    RBC 3.88 10/23/2020    WBC 22.0 10/23/2020     Lab Results   Component Value Date    CREATININE 0.40 10/23/2020    GLUCOSE 153 10/23/2020       Medical Decision Making-Imaging:     MRI OF THE BRAIN WITHOUT CONTRAST  10/22/2020 1:06 pm      TECHNIQUE:    Multiplanar multisequence MRI of the brain was performed without the    administration of intravenous contrast.         COMPARISON:    None.         HISTORY:    ORDERING SYSTEM PROVIDED HISTORY: new onset seizures, ovarian CA with diffuse    mets    TECHNOLOGIST PROVIDED HISTORY:    Pt given multiple doses of meds. Keeps moving. Best study possible at this    time. new onset seizures, ovarian CA with diffuse mets         FINDINGS:    INTRACRANIAL STRUCTURES/VENTRICLES: There is restricted diffusion identified    within the cortex of both occipital lobes and left parietal lobe compatible    with acute ischemia.  Small additional punctate infarct is identified within    the left frontal lobe near the convexity.         High FLAIR signal abnormality is identified within the cortices of both    frontal lobes and posterior left temporal lobe which could represent subacute    ischemia.  Mild chronic microvascular disease is noted within the    periventricular white matter.         No mass effect or midline shift. No evidence of an acute intracranial    hemorrhage.  The ventricles and sulci are normal in size and configuration.     The sellar/suprasellar regions appear unremarkable.  The normal signal voids    within the major intracranial vessels appear maintained.         ORBITS: The visualized portion of the orbits demonstrate no acute abnormality.         SINUSES: The visualized paranasal sinuses and mastoid air cells are well    aerated.         BONES/SOFT TISSUES: The bone marrow signal intensity appears normal. The soft    tissues demonstrate no acute abnormality.              Impression    Limited MRI of the brain demonstrates moderate acute ischemia involving both    occipital lobes and left parietal lobe involving the cortices.         Small acute punctate infarct within the posterior left frontal lobe near the    convexity.         Hyperintense FLAIR signal abnormality within the cortices of both frontal    lobes and posterior left temporal lobe may represent subacute ischemia or    inflammatory process such as vasculitis.  Posterior reversible    leukoencephalopathy is a possibility. Medical Decision [id number]:     Results for Jluis Knox (MRN [id number]) as of 10/23/2020 21:07   Ref. Range 10/22/2020 16:05   Albumin Index Latest Ref Range: <9.0  39.1 (H)   Albumin, CSF Latest Ref Range: 70 - 350 mg/L 129   Appearance, CSF Unknown CLEAR   Glucose, CSF Latest Ref Range: 40 - 70 mg/dL 78 (H)   IgG Index, CSF Latest Ref Range: <0.70  0.59   IgG Synthesis Rate, CSF Latest Ref Range: <3.3 mg/24 h < 3.3   IgG, CSF Latest Ref Range: 0.5 - 7.0 mg/dL 1.7   Oligo Bands Unknown Oligoclonal bands are performed at [facility] using isoelectric focusing and immunofixation. OLIGOCLONAL BANDING Unknown Rpt (A)   Protein, CSF Latest Ref Range: 15.0 - 45.0 mg/dL 25.4   Supernatant Color, CSF Unknown NOT REPORTED   Volume, CSF Unknown 5   RBC, CSF Latest Ref Range: 0 /mm3 0   WBC, CSF Latest Ref Range: <5 /mm3 0   Tube Number, CSF Unknown 3     Results for Jluis nKox (MRN [id number]) as of 10/23/2020 21:07   Ref. Range 10/5/2020 10:46    Latest Ref Range: <38 U/mL 223 (H)     Results for Jluis Knox (MRN [id number]) as of 10/23/2020 21:07   Ref.  Range 10/23/2020 01:26   HAEMOPHILUS INFLUENZA CSF FILM ARRAY Latest Ref Range: Not Detected  Not Detected   HHV-6 (HERPESVIRUS 6) CSF FILM ARRAY Latest Ref Range: Not Detected  Not Detected   HSV-1 CSF FILM ARRAY Latest Ref Range: Not Detected  Not Detected   HSV-2 CSF FILM ARRAY Latest Ref Range: Not Detected  Not Detected   PARECHOVIRUS CSF FILM ARRAY Latest Ref Range: Not Detected  Not Detected   ESCHERICHIA COLI K1 CSF FILM ARRAY Latest Ref Range: Not Detected  Not Detected   LISTERIA MONOCYTOGENES CSF FILM ARRAY Latest Ref Range: Not Detected  Not Detected   NEISSERIA MENIGITIDIS CSF FILM ARRAY Latest Ref Range: Not Detected  Not Detected   STREPTOCOCCUS AGALACTIAE CSF FILM ARRAY Latest Ref Range: Not Detected  Not Detected   STREPTOCOCCUS PNEUMONIAE CSF FILM ARRAY Latest Ref Range: Not Detected  Not Detected   CRYPTOCOCCUS NEOFORMANS/CARMEN CSF FILM ARR. Latest Ref Range: Not Detected  Not Detected       Medical Decision Making-Other:     Note:  · Labs, medications, radiologic studies were reviewed with personal review of films  · Large amounts of data were reviewed  · Discussed with nursing Staff, Discharge planner  · Infection Control and Prevention measures reviewed  · All prior entries were reviewed  · Administer medications as ordered  · Prognosis: Fair  · Discharge planning reviewed  · Follow up as outpatient. Thank you for allowing us to participate in the care of this patient. Please call with questions. [address]:    I have discussed the case, including pertinent history and exam findings with the residents and students. I have seen and examined the patient and the key elements of the encounter have been performed by me. I was present when the student obtained his information or examined the patient. I have reviewed the laboratory data, other diagnostic studies and discussed them with the residents. I have updated the medical record where necessary. I agree with the assessment, plan and orders as documented by the resident/ student.     Irish Nageotte, MD.      Pager: [phone] - Office: [phone]

## 2020-10-23 NOTE — PROCEDURES
LONG-TERM EEG-VIDEO 5656 [address]    Patient: Shaggy Burnette [facility]  Age: 60 y.o. MRN: [id number]    Referring Physician: Prem Humphries  History: The patient is a 60 y.o. female who presented breakthrough seizure/encephalopathy. This long-term video-EEG monitoring study was performed to determine the nature of the patient's clinical events. The patient is on neuroactive medications.    Shaggy Garcia   Current Facility-Administered Medications   Medication Dose Route Frequency Provider Last Rate Last Dose    levetiracetam (KEPPRA) 1000 mg/100 mL IVPB  1,000 mg Intravenous Q12H Betzy Bishop MD   Stopped at 10/23/20 0537    LORazepam (ATIVAN) injection 0.5 mg  0.5 mg Intramuscular Once Threnitesh Garcia APRN - CNP        0.9 % sodium chloride infusion   Intravenous Continuous Thresea Radha APRN - CNP 75 mL/hr at 10/22/20 2324      acetaminophen (TYLENOL) suppository 650 mg  650 mg Rectal Q6H PRN Orianasea Radha, APRN - CNP   650 mg at 10/23/20 0145    cefTRIAXone (ROCEPHIN) 2 g IVPB in D5W 50ml minibag  2 g Intravenous Q12H Thresemelyssa Garcia, APRN - CNP   Stopped at 10/23/20 0202    enoxaparin (LOVENOX) injection 40 mg  40 mg Subcutaneous Daily Erasmoa Radha, APRN - CNP   Stopped at 10/22/20 0900    LORazepam (ATIVAN) injection 2 mg  2 mg Intravenous Q4H PRN Mignon Garcia, APRN - CNP        nicotine (NICODERM CQ) 21 MG/24HR 1 patch  1 patch Transdermal Daily PRN Thresea Radha, APRN - CNP        polyethylene glycol (GLYCOLAX) packet 17 g  17 g Oral Daily PRN Erasmoa Radha, APRN - CNP        promethazine (PHENERGAN) tablet 12.5 mg  12.5 mg Oral Q6H PRN Orianasea Radha, APRN - CNP        Or    ondansetron [facility]) injection 4 mg  4 mg Intravenous Q6H PRN Orianasea Radha, APRN - CNP        sodium chloride flush 0.9 % injection 10 mL  10 mL Intravenous 2 times per day Thresea Radha, APRN - CNP   10 mL at 10/22/20 4142    activity conferred increased risk for focal onset seizures. Monitoring was continued in order to record the patient's typical events. The EKG channel revealed no abnormalities. Day 2 - 10/23/20, reviewed through 7:30 am    Interictal EEG Samples: Interictal EEG was unchanged from yesterday. Ictal EEG Recording / Patient Events: During this period the patient had no events or seizures. Summary: During this day of recording no events were recorded. The interictal EEG was abnormal due to diffuse polymorphic delta and theta slowing suggesting moderate to severe encephalopathy. Near continuous left central, parietal and temporal sharp wave discharges with fast activity conferred increased risk for focal onset seizures. Monitoring was continued in order to record the patient's typical events. The EKG channel revealed no abnormalities. Rema Grey MD  Diplomate, American Board of Psychiatry and Neurology  Diplomate, American Board of Clinical Neurophysiology  Diplomate, American Board of Epilepsy     Please note this is a preliminary report and updated daily. The final report will have a summary of behavior and electrographic findings with clinical correlation.

## 2020-10-23 NOTE — PROGRESS NOTES
NEUROLOGY INPATIENT PROGRESS NOTE    10/23/2020         Subjective: Juli Nolen is a  60 y.o. female admitted on 10/22/2020 with New onset seizure [facility]) [R56.9]    Briefly, this is a  60 y.o. female admitted on 10/22/2020 with past medical history of malignant neoplasm of ovary, diabetes, early onset seizure, anemia with acute encephalopathy and [facility] where he had a new onset generalized tonic-clonic seizure. And was given 4 mg Ativan and was loaded with 2 g of Keppra. She had leukocytosis of 26 and was febrile. She was given Vanco and Rocephin by ER. Patient was transferred to [facility] for further evaluation and management. Started on Keppra 500 mg twice daily . Patient had LP done. CA 9/18/20 295-->223  10/5/20  COVID-19 neg  Echo 1/20, EF 60-65%, concentric left ventricular hypertrophy. No significant valvular regurg or stenosis seen. Patient was hooked up to LTME that was consistent with left central, parietal and temporal sharp wave discharges with fast activity conferring an increased risk for focal onset seizures. Keppra was increased to 1 g twice daily. Today patient was seen and examined, patient is a total of 1 mg Ativan overnight for agitation, MRI brain limited was obtained due to patient's agitation, consistent with press . No current facility-administered medications on file prior to encounter. Current Outpatient Medications on File Prior to Encounter   Medication Sig Dispense Refill    omeprazole (PRILOSEC) 20 MG delayed release capsule Take 20 mg by mouth daily      HYDROcodone-acetaminophen (NORCO) 5-325 MG per tablet Take 1 tablet by mouth every 6 hours as needed for Pain.  oxyCODONE (OXYCONTIN) 10 MG extended release tablet Take 10 mg by mouth every 12 hours.       prochlorperazine (COMPAZINE) 10 MG tablet Take 10 mg by mouth every 6 hours as needed      tiZANidine (ZANAFLEX) 4 MG tablet Take 4 mg by mouth every 6 hours as needed      ibuprofen (ADVIL;MOTRIN) 200 MG tablet Take 200 mg by mouth every 6 hours as needed for Pain      oxyCODONE-acetaminophen (PERCOCET)  MG per tablet Take 1-2 tablets by mouth every 4 hours.  pantoprazole (PROTONIX) 40 MG tablet Take 40 mg by mouth 2 times daily      metFORMIN (GLUCOPHAGE-XR) 500 MG extended release tablet Take 2 tablets by mouth 2 times daily 30 tablet 2    ferrous sulfate (IRON 325) 325 (65 Fe) MG tablet Take 1 tablet by mouth daily (with breakfast) (Patient not taking: Reported on 10/16/2020) 60 tablet 5    aspirin 81 MG chewable tablet Take 81 mg by mouth nightly      simvastatin (ZOCOR) 40 MG tablet Take 40 mg by mouth nightly      sertraline (ZOLOFT) 100 MG tablet Take 50 mg by mouth daily          Allergies: Janelle Spivey has No Known Allergies. Past Medical History:   Diagnosis Date    Anemia     Bleeding 10/2020    intra-abdominal bleeding -due to splenic mass with GI infiltration. Status post embolization    [facility] [address]     Depression     Diabetes mellitus ([facility] [facility]     GERD (gastroesophageal reflux disease)     Metastatic cancer ([address] 10/2020    extensive intraabdominal and splenic involvement and lung mets.  Ovarian cancer ([facility] [address]     low grade serous ovarian carcinoma    Post chemo evaluation     2007: Chemo via med onc (Dr. Gilford Coats), 2008: Blase Shone due to rising CA-125, 2013: intraperitoneal chemo,12/2015: Ca125 - 25     Splenic lesion        Past Surgical History:   Procedure Laterality Date    ABSCESS DRAINAGE  2013    Franca rectal    ANUS SURGERY      ANAL FISSURECTOMY    COLECTOMY  03/2013    ex lap, tumor debulking, transverse colectomy w reanastamosis, subgastric omentectomy, intraperitoneal port placement    HYSTERECTOMY, TOTAL ABDOMINAL      IR EMBOLIZATION HEMORRHAGE  10/05/2020    intra-abdominal bleeding -due to splenic mass with GI infiltration.   Status post embolization [employer] interlock coils x7. mri condtional 3t ok, safe immediately post implant.     IR PORT PLACEMENT EQUAL OR GREATER THAN 5 YEARS  8/24/2020    IR PORT PLACEMENT EQUAL OR GREATER THAN 5 YEARS 8/24/2020 Paola Jaffe MD [facility]    PORT SURGERY      IP Port    TONSILLECTOMY         Medications:     levetiracetam  1,000 mg Intravenous Q12H    LORazepam  0.5 mg Intravenous Once    cefTRIAXone (ROCEPHIN) IV  2 g Intravenous Q12H    enoxaparin  40 mg Subcutaneous Daily    sodium chloride flush  10 mL Intravenous 2 times per day    vancomycin (VANCOCIN) intermittent dosing (placeholder)   Other RX Placeholder    vancomycin  1,500 mg Intravenous Q12H    acyclovir  10 mg/kg (Ideal) Intravenous Q8H    lidocaine PF  5 mL Intradermal Once    LORazepam  2 mg Intravenous Once     PRN Meds include: midazolam, [DISCONTINUED] acetaminophen **OR** acetaminophen, LORazepam, nicotine, polyethylene glycol, promethazine **OR** ondansetron, sodium chloride flush, acetaminophen    Objective:   BP (!) 137/55   Pulse 80   Temp 100.4 °F (38 °C) (Rectal)   Resp 18   Ht 5' 6\" (1.676 m)   Wt 192 lb 10.9 oz (87.4 kg)   SpO2 93%   BMI 31.10 kg/m²     Blood pressure range: Systolic (75RYC), GXX:912 , Min:134 , THD:405   ; Diastolic (36IZB), ELVIRA:74, Min:51, Max:99      ROS:  CONSTITUTIONAL: negative for fatigue and malaise   EYES: negative for double vision and photophobia    HEENT: negative for tinnitus and sore throat   RESPIRATORY: negative for cough, shortness of breath   CARDIOVASCULAR: negative for chest pain, palpitations, or syncope   GASTROINTESTINAL: negative for abdominal pain, nausea, vomiting, diarrhea, or constipation    GENITOURINARY: negative for incontinence or retention    MUSCULOSKELETAL: negative for neck or back pain, negative for extremity pain   NEUROLOGICAL: Negative for seizures, headaches, weakness, numbness, confusion, aphasia, dysarthria   PSYCHIATRIC: negative for agitation, hallucination, SI/HI   SKIN Negative for spontaneous contusions, rashes, or lesions        NEUROLOGIC EXAMINATION  GENERAL  Appears comfortable and in no distress, more awake and alert today   HEENT  NC/ AT   HEART  S1 and S2 heard; palpation of pulses: radial pulse    NECK  Supple and no bruits heard   MENTAL STATUS:  Alert, oriented, intact memory, no confusion, normal speech, normal language, no hallucination or delusion, more awake and alert today   CRANIAL NERVES: II     -      blurred vision  III,IV,VI -  PERR, EOMs full, no ptosis  V     -     Normal facial sensation   VII    -     Normal facial symmetry  VIII   -     Intact hearing   IX,X -     Symmetrical palate  XI    -     Symmetrical shoulder shrug  XII   -     Midline tongue, no atrophy    MOTOR FUNCTION: RUE: Significant for good strength of grade 5/5 in proximal and distal muscle groups   LUE: Significant for good strength of grade 5/5 in proximal and distal muscle groups   RLE: Significant for good strength of grade 5/5 in proximal and distal muscle groups   LLE: Significant for good strength of grade 5/5 in proximal and distal muscle groups      Normal bulk, normal tone and no involuntary movements, no tremor   SENSORY FUNCTION:  Normal touch, normal pin, normal vibration, normal proprioception   CEREBELLAR FUNCTION:  Intact fine motor control over upper limbs and lower limbs   REFLEX FUNCTION:  Symmetric in upper and lower extremities, no Babinski sign   STATION and GAIT Not tested     Data:    Lab Results:   CBC:   Recent Labs     10/22/20  0939 10/23/20  0559   WBC 24.2* 22.0*   HGB 10.0* 9.8*   * 741*     BMP:    Recent Labs     10/22/20  0939 10/23/20  0559   * 136   K 3.4* 3.7    100   CO2 20 22   BUN 4* 4*   CREATININE 0.40* 0.40*   GLUCOSE 107* 153*         Lab Results   Component Value Date    ALT 5 10/23/2020    AST 10 10/23/2020    TSH 0.93 10/22/2020    INR 1.0 10/23/2020    [id number] 405 10/22/2020       No results found for: PHENYTOIN, PHENYTOIN, VALPROATE, CBMZ    IMAGING  As above    Assessment and Plan  55-year-old female with acute encephalopathy likely PRESS initial SBP at outlying facility was 189. Follow-up MRI brain with contrast  Patient currently on Keppra 1 g twice daily,  Blood pressure 130s to 150s. Goal SBP less than 140. Started on Norvasc 5 mg. Labetalol 5 mg as needed for SBP greater than 140 hold for SBP less than 100. Please have been unremarkable. Currently on acyclovir, ceftriaxone and vancomycin. CSF paraneoplastic/encephalitis panel has been sent. Seizures precautions. PT OT. Thank you for this interesting consult.     Lacy Mendosa MD PGY-4 Neurology  10/23/2020  9:18 AM

## 2020-10-23 NOTE — PROGRESS NOTES
Pharmacy Vancomycin Consult     Vancomycin Day: 2  Current Dosin mg q 12 hrs. Temp max:  101.7    Recent Labs     10/22/20  0939 10/23/20  0559   BUN 4* 4*       Recent Labs     10/22/20  0939 10/23/20  0559   CREATININE 0.40* 0.40*       Recent Labs     10/22/20  0939 10/23/20  0559   WBC 24.2* 22.0*         Intake/Output Summary (Last 24 hours) at 10/23/2020 1557  Last data filed at 10/23/2020 1050  Gross per 24 hour   Intake 3257 ml   Output 600 ml   Net 2657 ml       Culture Date      Source                       Results  10/23/20              Blood x 2                    Pending    Ht Readings from Last 1 Encounters:   10/22/20 5' 6\" (1.676 m)        Wt Readings from Last 1 Encounters:   10/23/20 192 lb 10.9 oz (87.4 kg)         Body mass index is 31.1 kg/m². Estimated Creatinine Clearance: 173 mL/min (A) (based on SCr of 0.4 mg/dL (L)). Trough: 11.9  (drawn today Friday 10/23/20 at 14:06)    Assessment/Plan: Vancomycin trough is therapeutic. Keep same dose and frequency. Will continue to monitor and follow.   Matty Smith [facility]  10/23/2020  3:59 PM

## 2020-10-23 NOTE — PROGRESS NOTES
Physical Therapy  DATE: 10/23/2020    NAME: Vandana Quinn  MRN: [id number]   : 1965    Patient not seen this date for Physical Therapy due to:  [] Blood transfusion in progress  [] Hemodialysis  []  Patient Declined  [] Spine Precautions   [] Strict Bedrest  [] Surgery/ Procedure  [] Testing      [x] Other--per RN, not appropriate for PT eval. Pt was just given Ativan this AM.        [] PT being discontinued at this time. Patient independent. No further needs. [] PT being discontinued at this time as the patient has been transferred to palliative care. No further needs.     Dusty Hernandez, Student Physical Therapist

## 2020-10-24 ENCOUNTER — APPOINTMENT (OUTPATIENT)
Dept: GENERAL RADIOLOGY | Age: 55
DRG: 071 | End: 2020-10-24
Attending: INTERNAL MEDICINE
Payer: COMMERCIAL

## 2020-10-24 LAB
-: NORMAL
ABSOLUTE EOS #: 0.35 K/UL (ref 0–0.4)
ABSOLUTE IMMATURE GRANULOCYTE: 0 K/UL (ref 0–0.3)
ABSOLUTE LYMPH #: 1.23 K/UL (ref 1–4.8)
ABSOLUTE MONO #: 1.06 K/UL (ref 0.1–0.8)
AMORPHOUS: NORMAL
ANION GAP SERPL CALCULATED.3IONS-SCNC: 14 MMOL/L (ref 9–17)
BACTERIA: NORMAL
BASOPHILS # BLD: 1 % (ref 0–2)
BASOPHILS ABSOLUTE: 0.18 K/UL (ref 0–0.2)
BILIRUBIN URINE: NEGATIVE
BUN BLDV-MCNC: 6 MG/DL (ref 6–20)
BUN/CREAT BLD: ABNORMAL (ref 9–20)
CA 125: 237 U/ML
CALCIUM SERPL-MCNC: 9.2 MG/DL (ref 8.6–10.4)
CASTS UA: NORMAL /LPF (ref 0–8)
CHLORIDE BLD-SCNC: 104 MMOL/L (ref 98–107)
CO2: 22 MMOL/L (ref 20–31)
COLOR: YELLOW
COMMENT UA: ABNORMAL
CREAT SERPL-MCNC: 0.35 MG/DL (ref 0.5–0.9)
CRYSTALS, UA: NORMAL /HPF
DIFFERENTIAL TYPE: ABNORMAL
EOSINOPHILS RELATIVE PERCENT: 2 % (ref 1–4)
EPITHELIAL CELLS UA: NORMAL /HPF (ref 0–5)
GFR AFRICAN AMERICAN: >60 ML/MIN
GFR NON-AFRICAN AMERICAN: >60 ML/MIN
GFR SERPL CREATININE-BSD FRML MDRD: ABNORMAL ML/MIN/{1.73_M2}
GFR SERPL CREATININE-BSD FRML MDRD: ABNORMAL ML/MIN/{1.73_M2}
GLUCOSE BLD-MCNC: 124 MG/DL (ref 70–99)
GLUCOSE URINE: NEGATIVE
HCT VFR BLD CALC: 35.6 % (ref 36.3–47.1)
HEMOGLOBIN: 10.1 G/DL (ref 11.9–15.1)
IMMATURE GRANULOCYTES: 0 %
IRON SATURATION: 9 % (ref 20–55)
IRON: 20 UG/DL (ref 37–145)
KETONES, URINE: ABNORMAL
LEUKOCYTE ESTERASE, URINE: NEGATIVE
LYMPHOCYTES # BLD: 7 % (ref 24–44)
MCH RBC QN AUTO: 25 PG (ref 25.2–33.5)
MCHC RBC AUTO-ENTMCNC: 28.4 G/DL (ref 28.4–34.8)
MCV RBC AUTO: 88.1 FL (ref 82.6–102.9)
MONOCYTES # BLD: 6 % (ref 1–7)
MORPHOLOGY: ABNORMAL
MORPHOLOGY: ABNORMAL
MUCUS: NORMAL
NITRITE, URINE: NEGATIVE
NRBC AUTOMATED: 0 PER 100 WBC
OTHER OBSERVATIONS UA: NORMAL
PDW BLD-RTO: 21.2 % (ref 11.8–14.4)
PH UA: 6.5 (ref 5–8)
PLATELET # BLD: 876 K/UL (ref 138–453)
PLATELET ESTIMATE: ABNORMAL
PMV BLD AUTO: 9.3 FL (ref 8.1–13.5)
POTASSIUM SERPL-SCNC: 3.5 MMOL/L (ref 3.7–5.3)
PROTEIN UA: NEGATIVE
RBC # BLD: 4.04 M/UL (ref 3.95–5.11)
RBC # BLD: ABNORMAL 10*6/UL
RBC UA: NORMAL /HPF (ref 0–4)
RENAL EPITHELIAL, UA: NORMAL /HPF
SEG NEUTROPHILS: 84 % (ref 36–66)
SEGMENTED NEUTROPHILS ABSOLUTE COUNT: 14.78 K/UL (ref 1.8–7.7)
SODIUM BLD-SCNC: 140 MMOL/L (ref 135–144)
SPECIFIC GRAVITY UA: 1.03 (ref 1–1.03)
TOTAL IRON BINDING CAPACITY: 216 UG/DL (ref 250–450)
TRICHOMONAS: NORMAL
TURBIDITY: ABNORMAL
UNSATURATED IRON BINDING CAPACITY: 196 UG/DL (ref 112–347)
URINE HGB: NEGATIVE
UROBILINOGEN, URINE: NORMAL
WBC # BLD: 17.6 K/UL (ref 3.5–11.3)
WBC # BLD: ABNORMAL 10*3/UL
WBC UA: NORMAL /HPF (ref 0–5)
YEAST: NORMAL

## 2020-10-24 PROCEDURE — 2060000000 HC ICU INTERMEDIATE R&B

## 2020-10-24 PROCEDURE — 36415 COLL VENOUS BLD VENIPUNCTURE: CPT

## 2020-10-24 PROCEDURE — 80048 BASIC METABOLIC PNL TOTAL CA: CPT

## 2020-10-24 PROCEDURE — 95720 EEG PHY/QHP EA INCR W/VEEG: CPT | Performed by: PSYCHIATRY & NEUROLOGY

## 2020-10-24 PROCEDURE — 99232 SBSQ HOSP IP/OBS MODERATE 35: CPT | Performed by: INTERNAL MEDICINE

## 2020-10-24 PROCEDURE — 6360000002 HC RX W HCPCS: Performed by: NURSE PRACTITIONER

## 2020-10-24 PROCEDURE — 6360000002 HC RX W HCPCS: Performed by: STUDENT IN AN ORGANIZED HEALTH CARE EDUCATION/TRAINING PROGRAM

## 2020-10-24 PROCEDURE — 83540 ASSAY OF IRON: CPT

## 2020-10-24 PROCEDURE — 97530 THERAPEUTIC ACTIVITIES: CPT

## 2020-10-24 PROCEDURE — 85025 COMPLETE CBC W/AUTO DIFF WBC: CPT

## 2020-10-24 PROCEDURE — 71045 X-RAY EXAM CHEST 1 VIEW: CPT

## 2020-10-24 PROCEDURE — 81001 URINALYSIS AUTO W/SCOPE: CPT

## 2020-10-24 PROCEDURE — 94761 N-INVAS EAR/PLS OXIMETRY MLT: CPT

## 2020-10-24 PROCEDURE — 6370000000 HC RX 637 (ALT 250 FOR IP): Performed by: FAMILY MEDICINE

## 2020-10-24 PROCEDURE — 97163 PT EVAL HIGH COMPLEX 45 MIN: CPT

## 2020-10-24 PROCEDURE — 86304 IMMUNOASSAY TUMOR CA 125: CPT

## 2020-10-24 PROCEDURE — 83550 IRON BINDING TEST: CPT

## 2020-10-24 PROCEDURE — 6370000000 HC RX 637 (ALT 250 FOR IP): Performed by: NURSE PRACTITIONER

## 2020-10-24 PROCEDURE — 2580000003 HC RX 258: Performed by: STUDENT IN AN ORGANIZED HEALTH CARE EDUCATION/TRAINING PROGRAM

## 2020-10-24 PROCEDURE — 99254 IP/OBS CNSLTJ NEW/EST MOD 60: CPT | Performed by: INTERNAL MEDICINE

## 2020-10-24 PROCEDURE — 99233 SBSQ HOSP IP/OBS HIGH 50: CPT | Performed by: PSYCHIATRY & NEUROLOGY

## 2020-10-24 PROCEDURE — C9254 INJECTION, LACOSAMIDE: HCPCS | Performed by: STUDENT IN AN ORGANIZED HEALTH CARE EDUCATION/TRAINING PROGRAM

## 2020-10-24 PROCEDURE — 2580000003 HC RX 258: Performed by: NURSE PRACTITIONER

## 2020-10-24 PROCEDURE — 6370000000 HC RX 637 (ALT 250 FOR IP): Performed by: PHYSICIAN ASSISTANT

## 2020-10-24 PROCEDURE — 95708 EEG WO VID EA 12-26HR UNMNTR: CPT

## 2020-10-24 RX ORDER — HALOPERIDOL 5 MG/ML
1 INJECTION INTRAMUSCULAR
Status: ACTIVE | OUTPATIENT
Start: 2020-10-24 | End: 2020-10-24

## 2020-10-24 RX ORDER — LORAZEPAM 2 MG/ML
1 INJECTION INTRAMUSCULAR
Status: ACTIVE | OUTPATIENT
Start: 2020-10-24 | End: 2020-10-24

## 2020-10-24 RX ORDER — LOPERAMIDE HYDROCHLORIDE 2 MG/1
2 CAPSULE ORAL 4 TIMES DAILY PRN
Status: DISCONTINUED | OUTPATIENT
Start: 2020-10-24 | End: 2020-10-29 | Stop reason: HOSPADM

## 2020-10-24 RX ADMIN — ACETAMINOPHEN 650 MG: 325 TABLET ORAL at 20:52

## 2020-10-24 RX ADMIN — SODIUM CHLORIDE, PRESERVATIVE FREE 10 ML: 5 INJECTION INTRAVENOUS at 08:40

## 2020-10-24 RX ADMIN — LEVETIRACETAM 1000 MG: 10 INJECTION INTRAVENOUS at 16:35

## 2020-10-24 RX ADMIN — LOPERAMIDE HYDROCHLORIDE 2 MG: 2 CAPSULE ORAL at 21:50

## 2020-10-24 RX ADMIN — ENOXAPARIN SODIUM 40 MG: 40 INJECTION SUBCUTANEOUS at 08:38

## 2020-10-24 RX ADMIN — AMLODIPINE BESYLATE 5 MG: 5 TABLET ORAL at 08:39

## 2020-10-24 RX ADMIN — DEXTROSE MONOHYDRATE 100 MG: 50 INJECTION, SOLUTION INTRAVENOUS at 20:43

## 2020-10-24 RX ADMIN — DEXTROSE 200 MG: 50 INJECTION, SOLUTION INTRAVENOUS at 09:17

## 2020-10-24 RX ADMIN — SODIUM CHLORIDE, PRESERVATIVE FREE 10 ML: 5 INJECTION INTRAVENOUS at 20:43

## 2020-10-24 RX ADMIN — LEVETIRACETAM 1000 MG: 10 INJECTION INTRAVENOUS at 04:01

## 2020-10-24 RX ADMIN — ACETAMINOPHEN 650 MG: 325 TABLET ORAL at 08:38

## 2020-10-24 ASSESSMENT — PAIN SCALES - GENERAL
PAINLEVEL_OUTOF10: 5
PAINLEVEL_OUTOF10: 3

## 2020-10-24 ASSESSMENT — ENCOUNTER SYMPTOMS
ABDOMINAL DISTENTION: 0
COLOR CHANGE: 0
EYE DISCHARGE: 0
EYE ITCHING: 0
APNEA: 0

## 2020-10-24 NOTE — PROGRESS NOTES
[facility]   IN-PATIENT SERVICE      NEUROLOGY PROGRESS  NOTE            Date:   10/24/2020  Patient name:  Akila Heaton [facility]  Date of admission:  10/22/2020  YOB: 1965      Interval History:     Clinically patient appears improved. She is calm, awake, resting comfortably. Complaining of mild headache. There is some slight confusion but overall appropriate. LTME showed several focal subclinical seizures arising from the left posterior quadrant. Patient did not appear to have any clinical correlate. Started Vimpat this morning with 200 mg load followed by 100 mg twice daily. Blood pressures are controlled, -140s. Discussed with son, Will at bedside at length. History of Present Illness: The patient is a 60 y.o. female who presents with confusion, new onset seizure. The patient was seen and examined and the chart was reviewed. Patient was transferred here from [facility] ED for further work-up of new onset seizure. Patient had presented after abrupt episode of confusion that afternoon followed by generalized clonic seizure. She was given Ativan and loaded on Keppra and transferred here. Patient was covered with antibiotics for possible suspicion of CNS infection. Underwent lumbar puncture which did not reveal any evidence of infection. MRI of the brain showed suspicion for posterior reversible encephalopathy syndrome. LTM EEG showed lateralized periodic discharges coming from the left central, parietal, temporal regions. Patient's mentation began to improve 24 to 48 hours after admission. Past Medical History:     Past Medical History:   Diagnosis Date    Anemia     Bleeding 10/2020    intra-abdominal bleeding -due to splenic mass with GI infiltration.   Status post embolization    Cervical cancer ([address]     Depression     Diabetes mellitus ([address]     GERD (gastroesophageal reflux disease)     Metastatic cancer ([address] 10/2020    extensive intraabdominal hemisphere. The interictal EEG was abnormal due to diffuse polymorphic delta and theta slowing suggesting moderate to severe encephalopathy. Near continuous left central, parietal and temporal sharp wave discharges with fast activity c/w LPDs+F+R conferred increased risk for focal onset seizures. Monitoring was continued in order to record the patient's typical events. The EKG channel revealed no abnormalities. CT head: No acute process    CTA head and neck: No significant stenosis or occlusion    MRI brain: T2 flair hyperintensities in the bilateral occipital regions, left parietal and left frontal lobe. Questionable restricted diffusion associated with these lesions. Likely suggestive of posterior reversible encephalopathy syndrome (PRES). 2D echo (8/2020): EF 60 to 65%. LA normal size. I personally reviewed all of the above medications, clinical laboratory, imaging and other diagnostic tests. Impression:      1. Posterior reversible encephalopathy syndrome (PRES); etiology underlying immunosuppression from breast CA, in addition to recent spike in blood pressures with last hospitalization. Possible adverse effect of Doxol (current), Avastin (Discotinued). 2. Focal onset seizures arising from left posterior quadrant secondary to above  3. Bitemporal hemianopsia secondary to above  4. Leukocytosis; downtrending. Fever; resolved. No clear source of infection. 5. Ovarian cancer with metastasis    Plan:      Added Vimpat 200 mg IV load followed by 100 mg twice daily maintenance with findings of subclinical focal seizures. Maintain Keppra 1000 mg twice daily.    Continue LTME   Strict blood pressure control goal SBP < 140   Hold off MRI brain with contrast at this time   Will discuss with oncology regarding potential chemotherapy induced PRES and potential other options   Discussed at length with the son in addition to reviewing all images and treatment plan this morning.    Discussed with primary team, Dr. Alyse Schmitt We will follow        Electronically signed by Vaishali Smith DO on 10/24/2020 at 1:03 PM      Vaishali Smith, [facility]

## 2020-10-24 NOTE — PROGRESS NOTES
Physical Therapy    Facility/Department: [facility] NEURO  Initial Assessment    NAME: Dmitriy Reyes  : 1965  MRN: [id number]    Date of Service: 10/24/2020    Discharge Recommendations:  Patient would benefit from continued therapy after discharge      Assessment   Assessment: Pt is 61 y/o female with general weakness and decrease balance due to recent medical issues; will benefit from PT. Treatment Diagnosis: general weakness  Prognosis: Good;Fair  Decision Making: High Complexity  Patient Education: PT eval and POC  REQUIRES PT FOLLOW UP: Yes  Activity Tolerance  Activity Tolerance: Patient Tolerated treatment well       Patient Diagnosis(es): There were no encounter diagnoses. has a past medical history of Anemia, Bleeding, C[facility]ical [facility] ([facility] [address] Depression, Diabetes mellitus ([facility] [address] GERD (gastroesophageal reflux disease), Metastatic cancer ([facility] [address] Ovarian cancer ([facility] [address] Post chemo evaluation, and Splenic lesion. has a past surgical history that includes Hysterectomy, total abdominal; Port Surgery; Tonsillectomy; IR PORT PLACEMENT > 5 YEARS (2020); Anus surgery; Abscess Drainage (); colectomy (2013); and IR EMBOLIZATION HEMORRHAGE (10/05/2020). Restrictions  Restrictions/Precautions  Restrictions/Precautions: General Precautions, Fall Risk, Seizure     Vision/Hearing  Vision: Within Functional Limits  Hearing: Within functional limits       Subjective  General  Chart Reviewed: Yes  Response To Previous Treatment: Not applicable  Family / Caregiver Present: Yes  Follows Commands: Within Functional Limits  Subjective  Subjective: Feeling a bit better today than she had been.     Orientation  Orientation  Overall Orientation Status: Within Functional Limits     Social/Functional History  Social/Functional History  Lives With: Son(adult son)  Type of Home: Trailer  Home Layout: One level  Home Access: Stairs to enter with rails  Entrance Stairs - Number of Steps: 3  Entrance Stairs - Rails: Left  Bathroom Equipment: Grab bars in shower  Receives Help From: Family  ADL Assistance: Independent  Ambulation Assistance: Independent  Transfer Assistance: Independent  Active : Yes  Mode of Transportation: Car  Additional Comments: Pt states she has had about 3-4 falls this year; no DME. Works full time at [employer]. Cognition   Cognition  Overall Cognitive Status: WFL  Cognition Comment: Pt occasionally with increase confusion with multi-step commands and orientation questions    Objective  AROM RLE (degrees)  RLE AROM: WFL  AROM LLE (degrees)  LLE AROM : WFL     Strength RLE  Comment: grossly 4/5  Strength LLE  Comment: grossly 4/5     Bed mobility  Supine to Sit: Minimal assistance  Sit to Supine: Contact guard assistance     Transfers  Sit to Stand: Contact guard assistance  Stand to sit: Stand by assistance     Ambulation 1  Surface: level tile  Device: Hand-Held Assist  Assistance: Contact guard assistance  Distance: Pt able to side step to head of bed and march in place. Requires min A with feet together eyes closed and mod UE assist for SLS. Balance  Sitting - Static: Good  Sitting - Dynamic: Good  Standing - Static: Good  Standing - Dynamic: [address]  Times per week: 5-6 x week  Times per day: Daily  Current Treatment Recommendations: Strengthening, Neuromuscular Re-education, Home Exercise Program, Safety Education & Training, Balance Training, Endurance Training, Functional Mobility Training, Transfer Training, Gait Training, Stair training  Safety Devices  Type of devices:  All fall risk precautions in place      AM-PAC Score  AM-PAC Inpatient Mobility without Stair Climbing Raw Score : 15 (10/24/20 1430)  AM-PAC Inpatient without Stair Climbing T-Scale Score : 43.03 (10/24/20 1430)  Mobility Inpatient CMS 0-100% Score: 47.43 (10/24/20 1430)  Mobility Inpatient without Stair CMS G-Code Modifier : CK (10/24/20 1430)       Goals  Short term goals  Time Frame for Short term goals: 10 days  Short term goal 1: Pt to ambulate 150ft without AD and no LOB. Short term goal 2: Pt to tolerate 20-30 mins ther ex/act for improved strength and endurance with ADL's. Short term goal 3: Pt to demonstrate good dynamic standing balance for decrease fall risk. Short term goal 4: Pt to perform all bed mob, transfers, and gait independently.   Patient Goals   Patient goals : get better       Therapy Time   Individual Concurrent Group Co-treatment   Time In 1330         Time Out 1409         Minutes 44                 Lisa Gonzáles Neither, PT, DPT, CMPT

## 2020-10-24 NOTE — PROGRESS NOTES
[facility]  Office: [address], DO, Susi Hunter DO, Angela Beltran, DO, Kin Braga, DO, Sebastien Garcia MD, Laya Acevedo MD, Vilma Reid MD, Yas Orourke MD, Luiz Spencer MD, Jaylon Lamb MD, Zackary Venegas MD, Elizabeth Chaudhary MD, Mena Crocker MD, Alexandrea King DO, Paloma Powers MD, Lay Mcintosh MD, Tabatha Sykes DO, Jaren Jimenez MD,  Blane Hernandes DO, Pj Garcia MD, Sumi Lilly MD, Yoni Deleon, [facility], [facility], CNP, Nick Jimenez, CNP, Bill De Los Santos, CNS, Jhoana Kurtz, CNP, Claude Leep, CNP, Dorothy Barker, CNP, Betty Yates, CNP, Katharina Dickson, CNP, Sebas Rivera PA-C, Herminio Billingsley, [facility], Pooja Shukla, CNP, Marjorie Hewitt, CNP, Misbah Guthrie, CNP, Edgar Montilla, CNP, Paul Da Silva, [facility]   [address] [facility]    Progress Note    10/24/2020    9:26 AM    Name:   Alexandra Perez  MRN:     [id number]     Acct:      [de-identified]   Room:   [address] [place] [address] Day:  2  Admit Date:  10/22/2020 12:20 AM    PCP:   No primary care provider on file. Code Status:  Full Code    Subjective:     C/C: AMS     Interval History Status: improved. No issues overnight  More awake/alert today  Knows day/month born, does not know year  Oriented to person/place  Still intermittently slow to respond  No other complaints   Discussed with son at bedside  Discussed with Dr Kaleigh Gaspar     Brief History:     60 F presented with seizure episode. Patient currently lethargic, not cooperating with questions. Answering intermittently, disoriented to place. On chart review initially presented to [facility] with confusion. Noted to have seizure episode, no prior history of seizures. Started on antibiotics, transferred to [facility] for further neurological workup.      Review of Systems:     Constitutional:  negative for chills, fevers, sweats  Respiratory:  negative for cough, dyspnea on exertion, shortness of breath  Cardiovascular:  negative for chest pain, chest pressure/discomfort  Gastrointestinal:  negative for abdominal pain, constipation, diarrhea, nausea, vomiting  Neurological:  negative for dizziness, headache    Medications: Allergies:  No Known Allergies    Current Meds:   Scheduled Meds:    lacosamide (VIMPAT) IVPB  100 mg Intravenous BID    lacosamide (VIMPAT) IVPB  200 mg Intravenous Once    levetiracetam  1,000 mg Intravenous Q12H    LORazepam  0.5 mg Intravenous Once    amLODIPine  5 mg Oral Daily    LORazepam  2 mg Intravenous Once    enoxaparin  40 mg Subcutaneous Daily    sodium chloride flush  10 mL Intravenous 2 times per day    lidocaine PF  5 mL Intradermal Once    LORazepam  2 mg Intravenous Once     Continuous Infusions:    sodium chloride 75 mL/hr at 10/22/20 3544     PRN Meds: haloperidol lactate, LORazepam, labetalol, [DISCONTINUED] acetaminophen **OR** acetaminophen, LORazepam, nicotine, polyethylene glycol, promethazine **OR** ondansetron, sodium chloride flush, acetaminophen    Data:     Past Medical History:   has a past medical history of Anemia, Bleeding, Cervical cancer ([facility] Depression, Diabetes mellitus ([facility] [address] GERD (gastroesophageal reflux disease), Metastatic cancer ([facility] [address] [facility] Post chemo evaluation, and Splenic lesion. Social History:   reports that she has been smoking. She has been smoking about 1.00 pack per day. She uses smokeless tobacco. She reports previous alcohol use. She reports that she does not use drugs.      Family History:   Family History   Problem Relation Age of Onset    Alcohol Abuse Mother     Cirrhosis Mother        Vitals:  /68   Pulse 98   Temp 97.7 °F (36.5 °C) (Oral)   Resp 19   Ht 5' 6\" (1.676 m)   Wt 192 lb 10.9 oz (87.4 kg)   SpO2 95%   BMI 31.10 kg/m²   Temp (24hrs), Av.5 °F (36.9 °C), Min:97.7 °F (36.5 °C), Max:98.9 °F (37.2 °C)    Recent Labs     10/22/20  0041   POCGLU 144*       I/O (24Hr): Intake/Output Summary (Last 24 hours) at 10/24/2020 0926  Last data filed at 10/23/2020 1050  Gross per 24 hour   Intake --   Output 600 ml   Net -600 ml       Labs:  Hematology:  Recent Labs     10/22/20  0939 10/23/20  0559   WBC 24.2* 22.0*   RBC 3.88* 3.88*   HGB 10.0* 9.8*   HCT 32.4* 33.7*   MCV 83.5 86.9   MCH 25.8 25.3   MCHC 30.9 29.1   RDW 21.2* 21.1*   * 741*   MPV 8.8 9.1   CRP 52.2*  --    INR 1.0 1.0     Chemistry:  Recent Labs     10/22/20  0939 10/23/20  0559   * 136   K 3.4* 3.7    100   CO2 20 22   GLUCOSE 107* 153*   BUN 4* 4*   CREATININE 0.40* 0.40*   ANIONGAP 12 14   LABGLOM >60 >60   GFRAA >60 >60   CALCIUM 8.7 8.8     Recent Labs     10/22/20  0041 10/22/20  0939 10/22/20  1605 10/23/20  0559   PROT  --  6.5  --  6.7   LABALBU  --  3.1* 3.3* 3.1*   TSH  --  0.93  --   --    AST  --  9  --  10   ALT  --  5  --  5   ALKPHOS  --  134*  --  126*   BILITOT  --  <0.10*  --  <0.10*   AMMONIA  --  63*  --   --    POCGLU 144*  --   --   --      ABG:No results found for: POCPH, PHART, PH, POCPCO2, IXF9YBF, PCO2, POCPO2, PO2ART, PO2, POCHCO3, HYI5KAW, HCO3, NBEA, PBEA, BEART, BE, THGBART, THB, RVH3DAC, IEJX3VYM, R6FJWNLR, O2SAT, FIO2  Lab Results   Component Value Date/Time    SPECIAL RIGHT HAND AMOUNT UNKNOWN 10/23/2020 01:08 AM    SPECIAL LT HAND AMOUNT UNKNOWN 10/23/2020 01:08 AM     Lab Results   Component Value Date/Time    CULTURE NO GROWTH 1 DAY 10/23/2020 01:08 AM    CULTURE NO GROWTH 1 DAY 10/23/2020 01:08 AM       Radiology:  Ir Lumbar Puncture For Diagnosis    Result Date: 10/22/2020  Successful fluoroscopic-guided lumbar puncture.      Mri Limited Brain    Result Date: 10/23/2020  Limited MRI of the brain demonstrates moderate acute ischemia involving both occipital lobes and left parietal lobe involving the cortices Small acute punctate infarct within the posterior left frontal lobe near the convexity Hyperintense FLAIR signal mildly within the cortices of both frontal lobes and posterior left temporal lobe may represent subacute ischemia or inflammatory process such as vasculitis.   Posterior reversible leukoencephalopathy is a possibility       Physical Examination:        General appearance:  alert, cooperative and no distress  Mental Status:  oriented to person, intermittently confused   Lungs:  clear to auscultation bilaterally, normal effort  Heart:  regular rate and rhythm  Abdomen:  soft, nontender, nondistended, normal bowel sounds  Extremities:  no edema, redness, tenderness in the calves  Skin:  no gross lesions, rashes, induration    Assessment:        Hospital Problems           Last Modified POA    * (Principal) New onset seizure ([address] 10/22/2020 Yes    Malignant neoplasm of ovary ([address] 10/22/2020 Yes    Type 2 diabetes mellitus without complication, without long-term current use of insulin ([address] 10/22/2020 Yes    Anemia 10/22/2020 Yes    Splenic lesion 10/22/2020 Yes    Ovarian cancer ([address] 10/22/2020 Yes    Overview Signed 10/22/2020  1:05 AM by ESTRELLITA Mock - CNP     low grade serous ovarian carcinoma         Acute encephalopathy 10/22/2020 Yes    PRES (posterior reversible encephalopathy syndrome) 10/23/2020 Yes    Hemianopia, bitemporal 10/23/2020 Yes          Plan:        - Neurology consulted - continue LTME, added vimpat  - Check MRI - demonstrating CVA  - LTME per neurology  - LP per IR - negative   - Continue empiric antibiotics for now  - Seizure precautions   - Neuro checks  - Monitor glucose, continue insulin correction   - Discussed with Dr Lavonne Carlton, continue LTME, AED adjustments in progress  - Discussed with son at bedside       Echo Angel MD  10/24/2020  9:26 AM

## 2020-10-24 NOTE — PROCEDURES
LONG-TERM EEG-VIDEO [id number] [address]    Patient: [facility]  Age: 60 y.o. MRN: [id number]    Referring Physician: Dev Jones  History: The patient is a 60 y.o. female who presented breakthrough seizure/encephalopathy. This long-term video-EEG monitoring study was performed to determine the nature of the patient's clinical events. The patient is on neuroactive medications.    Soco Garcia   Current Facility-Administered Medications   Medication Dose Route Frequency Provider Last Rate Last Dose    haloperidol lactate (HALDOL) injection 1 mg  1 mg Intramuscular Once PRN Monserrat Berrios MD        LORazepam (ATIVAN) injection 1 mg  1 mg Intravenous Once PRN Monserrat Berrios MD        levetiracetam (KEPPRA) 1000 mg/100 mL IVPB  1,000 mg Intravenous Q12H Janak Arceo MD   Stopped at 10/24/20 0416    LORazepam (ATIVAN) injection 0.5 mg  0.5 mg Intravenous Once Rik Matamoros APRN - CNP        amLODIPine (NORVASC) tablet 5 mg  5 mg Oral Daily Tori Spann MD   5 mg at 10/23/20 1747    labetalol (NORMODYNE;TRANDATE) injection 5 mg  5 mg Intravenous Q6H PRN Tori Spann MD        LORazepam (ATIVAN) injection 2 mg  2 mg Intravenous Once Delonte Sullivan DO        0.9 % sodium chloride infusion   Intravenous Continuous Rik Matamoros APRN - CNP 75 mL/hr at 10/22/20 2324      acetaminophen (TYLENOL) suppository 650 mg  650 mg Rectal Q6H PRN Rik Shiloh, APRN - CNP   650 mg at 10/23/20 0145    enoxaparin (LOVENOX) injection 40 mg  40 mg Subcutaneous Daily Rik Shiloh, APRN - CNP   40 mg at 10/23/20 1153    LORazepam (ATIVAN) injection 2 mg  2 mg Intravenous Q4H PRN Rik Matamoros, APRN - CNP        nicotine (NICODERM CQ) 21 MG/24HR 1 patch  1 patch Transdermal Daily PRN Rik Shiloh, APRN - CNP        polyethylene glycol (GLYCOLAX) packet 17 g  17 g Oral Daily PRN Rik Shiloh, APRN - CNP        promethazine (PHENERGAN) tablet 12.5 mg  12.5 mg Oral Q6H PRN Carlena Pain, APRN - CNP        Or    ondansetron [facility]) injection 4 mg  4 mg Intravenous Q6H PRN Carlena Pain, APRN - CNP        sodium chloride flush 0.9 % injection 10 mL  10 mL Intravenous 2 times per day Carlena Pain, APRN - CNP   10 mL at 10/23/20 1154    sodium chloride flush 0.9 % injection 10 mL  10 mL Intravenous PRN Carlena Pain, APRN - CNP        lidocaine PF 2 % injection 5 mL  5 mL Intradermal Once Modesto Jordan MD        LORazepam (ATIVAN) injection 2 mg  2 mg Intravenous Once Julio Forrest MD        acetaminophen (TYLENOL) tablet 650 mg  650 mg Oral Q4H PRN JOANN Webb   650 mg at 10/23/20 2056     Technical Description: This is a 21-channel digital EEG recording with time-locked video. Electrodes were placed in accordance with the 10-20 International System of Electrode Placement. Single lead EKG monitoring was included. Baseline EEG Recording:  A formal baseline EEG recording was not obtained. Day 1 - 10/22/20, starting at 18:57    Interictal EEG Samples: The background activity of the right hemisphere consisted of 4 to 5 Hz of polymorphic delta and theta activity of 30 to 40 µV. The activity over the left hemisphere was slower consisted of 3 to 4 Hz of polymorphic delta activity of high amplitude. Frequent and near continuous left hemispheric sharp waves were seen maximal at C3, P3 and T5, often occurring in a periodic pattern with superimposed fast activity. The EKG channel revealed no abnormalities. Ictal EEG Recording / Patient Events: During this period the patient had no events or seizures. Summary: During this day of recording no events were recorded. The interictal EEG was abnormal due to diffuse polymorphic delta and theta slowing suggesting moderate to severe encephalopathy.   Near continuous left central, parietal and temporal sharp wave discharges with fast activity conferred increased risk for focal onset encephalopathy. Near continuous left central, parietal and temporal sharp wave discharges with fast activity c/w LPDs+F+R conferred increased risk for focal onset seizures. Monitoring was continued in order to record the patient's typical events. The EKG channel revealed no abnormalities. Kat Schaefer MD  Diplomate, American Board of Psychiatry and Neurology  Diplomate, American Board of Clinical Neurophysiology  Diplomate, American Board of Epilepsy     Please note this is a preliminary report and updated daily. The final report will have a summary of behavior and electrographic findings with clinical correlation.

## 2020-10-24 NOTE — FLOWSHEET NOTE
Esteban Ponce wrote up a referral for a  to come in between 9 AM and 11 AM Tomorrow 10-23-20, to discuses POA paperwork.         10/23/20 9859   Encounter Summary   Referral/Consult From: Multi-disciplinary team   Support System Children   Continue Visiting   (10/23/20)   Complexity of Encounter Low   Length of Encounter 15 minutes

## 2020-10-24 NOTE — PROGRESS NOTES
Yovani Kurtz, at bedside. Writer, son, and patient had a thorough conversation about the care plan that is established. Son and patient expressed that they would like a meeting tomorrow with hem/onc, primary, and neurology at the same time. The son would like to be present for this meeting and stated he would be here tomorrow between [address]. m. and 11a.m. In that time the patient would like to name the sonYovani, as her POA. Spiritual care is notified of the request.   Neuro, hem/onc, and primary were notified of the request for a meeting and given a time frame of the son's presence tomorrow. Son would like to be called for any change in care plan and bedside visit from doctor. Yovani Kurtz, is listed in emergency contacts and phone number is verified as [phone].

## 2020-10-24 NOTE — CONSULTS
Today's Date: 10/24/2020  Patient Name: Pavel Pinon  Date of admission: 10/22/2020 12:20 AM  Patient's age: 60 y.o., 1965  Admission Dx: New onset seizure ([facility] [address] [R56.9]    Reason for Consult: ovarian cancer   Requesting Physician: Treva Priest MD    CHIEF COMPLAINT:  No chief complaint on file. History Obtained From: Patient family and chart    HISTORY OF PRESENT ILLNESS:    Pavel Pinon is a 60 y.o.  female who is admitted to the hospital for altered mental status when she presented to [facility] and then subsequently transferred to [facility] for further care. She had limited MRI which showed possible acute stroke versus [place] ES and has been evaluated by neurology. Patient has been receiving chemotherapy for recurrent ovarian cancer with Dr. Ramírez Stephens as outpatient. She recently was started on Doxil and Avastin on 9/18. She received only 1 dose of Avastin. Brief oncologic history:  Patient follows with Dr. Ramírez Stephens as outpatient. DIAGNOSIS:       Recurrent ovarian cancer. Original diagnosis 2005 with multiple recurrences. Diffuse metastasis. CURRENT THERAPY:         Doxil/ Avastin started 9/18/2020. Avastin will be discontinued due to recent GI bleeding. Status post recent vascular embolization    Past Medical History:   has a past medical history of Anemia, Bleeding, Cervical cancer ([address] Depression, Diabetes mellitus ([address] GERD (gastroesophageal reflux disease), Metastatic cancer ([address] Ovarian cancer ([address] Post chemo evaluation, and Splenic lesion. Past Surgical History:   has a past surgical history that includes Hysterectomy, total abdominal; Port Surgery; Tonsillectomy; IR PORT PLACEMENT > 5 YEARS (8/24/2020); Anus surgery; Abscess Drainage (2013); colectomy (03/2013); and IR EMBOLIZATION HEMORRHAGE (10/05/2020). Medications:    Prior to Admission medications    Medication Sig Start Date End Date Taking?  Authorizing Provider   omeprazole (PRILOSEC) 20 MG delayed release capsule Take 20 mg by mouth daily   Yes Historical Provider, MD   HYDROcodone-acetaminophen (NORCO) 5-325 MG per tablet Take 1 tablet by mouth every 6 hours as needed for Pain. Yes Historical Provider, MD   oxyCODONE (OXYCONTIN) 10 MG extended release tablet Take 10 mg by mouth every 12 hours. Yes Historical Provider, MD   prochlorperazine (COMPAZINE) 10 MG tablet Take 10 mg by mouth every 6 hours as needed   Yes Historical Provider, MD   tiZANidine (ZANAFLEX) 4 MG tablet Take 4 mg by mouth every 6 hours as needed   Yes Historical Provider, MD   ibuprofen (ADVIL;MOTRIN) 200 MG tablet Take 200 mg by mouth every 6 hours as needed for Pain   Yes Historical Provider, MD   oxyCODONE-acetaminophen (PERCOCET)  MG per tablet Take 1-2 tablets by mouth every 4 hours.  10/12/20   Historical Provider, MD   pantoprazole (PROTONIX) 40 MG tablet Take 40 mg by mouth 2 times daily    Historical Provider, MD   metFORMIN (GLUCOPHAGE-XR) 500 MG extended release tablet Take 2 tablets by mouth 2 times daily 10/5/20   Bijal Quiñonez MD   ferrous sulfate (IRON 325) 325 (65 Fe) MG tablet Take 1 tablet by mouth daily (with breakfast)  Patient not taking: Reported on 10/16/2020 9/18/20   Bijal Quiñonez MD   aspirin 81 MG chewable tablet Take 81 mg by mouth nightly    Historical Provider, MD   simvastatin (ZOCOR) 40 MG tablet Take 40 mg by mouth nightly    Historical Provider, MD   sertraline (ZOLOFT) 100 MG tablet Take 50 mg by mouth daily  8/8/20   Historical Provider, MD     Current Facility-Administered Medications   Medication Dose Route Frequency Provider Last Rate Last Dose    haloperidol lactate (HALDOL) injection 1 mg  1 mg Intramuscular Once PRN Fay Barbosa MD        LORazepam (ATIVAN) injection 1 mg  1 mg Intravenous Once PRN Fay Barbosa MD        lacosamide (VIMPAT) 100 mg in dextrose 5 % 60 mL IVPB  100 mg Intravenous BID Paul Sexton MD        levetiracetam (KEPPRA) 1000 mg/100 mL IVPB  1,000 mg Intravenous Q12H Ashley Lemos MD   Stopped at 10/24/20 0416    LORazepam (ATIVAN) injection 0.5 mg  0.5 mg Intravenous Once Dallis Postin, APRN - CNP        amLODIPine (NORVASC) tablet 5 mg  5 mg Oral Daily Kev Peñaloza MD   5 mg at 10/24/20 0839    labetalol (NORMODYNE;TRANDATE) injection 5 mg  5 mg Intravenous Q6H PRN Kev Peñaloza MD        LORazepam (ATIVAN) injection 2 mg  2 mg Intravenous Once Delonte Sullivan DO        0.9 % sodium chloride infusion   Intravenous Continuous Dallis Postin, APRN - CNP 75 mL/hr at 10/22/20 2324      acetaminophen (TYLENOL) suppository 650 mg  650 mg Rectal Q6H PRN Dallis Postin, APRN - CNP   650 mg at 10/23/20 0145    enoxaparin (LOVENOX) injection 40 mg  40 mg Subcutaneous Daily Dallis Postin, APRN - CNP   40 mg at 10/24/20 8713    LORazepam (ATIVAN) injection 2 mg  2 mg Intravenous Q4H PRN Dallis Postin, APRN - CNP        nicotine (NICODERM CQ) 21 MG/24HR 1 patch  1 patch Transdermal Daily PRN Dallis Postin, APRN - CNP        polyethylene glycol (GLYCOLAX) packet 17 g  17 g Oral Daily PRN Dallis Postin, APRN - CNP        promethazine (PHENERGAN) tablet 12.5 mg  12.5 mg Oral Q6H PRN Dallis Postin, APRN - CNP        Or    ondansetron [facility]) injection 4 mg  4 mg Intravenous Q6H PRN Dallis Postin, APRN - CNP        sodium chloride flush 0.9 % injection 10 mL  10 mL Intravenous 2 times per day Dallis Postin, APRN - CNP   10 mL at 10/24/20 0840    sodium chloride flush 0.9 % injection 10 mL  10 mL Intravenous PRN Dallis Postin, APRN - CNP        lidocaine PF 2 % injection 5 mL  5 mL Intradermal Once Kev Peñaloza MD        LORazepam (ATIVAN) injection 2 mg  2 mg Intravenous Once Jonna Urbina MD        acetaminophen (TYLENOL) tablet 650 mg  650 mg Oral Q4H PRN Kate High, PA   650 mg at 10/24/20 4851       Allergies:  Patient has no known allergies. Social History:   reports that she has been smoking.  She has been smoking about 1.00 pack per day. She uses smokeless tobacco. She reports previous alcohol use. She reports that she does not use drugs. Family History: family history includes Alcohol Abuse in her mother; Cirrhosis in her mother. REVIEW OF SYSTEMS:    Constitutional: No fever or chills. No night sweats, no weight loss   Eyes: No eye discharge, double vision, or eye pain   HEENT: negative for sore mouth, sore throat, hoarseness and voice change   Respiratory: negative for cough , sputum, dyspnea, wheezing, hemoptysis, chest pain   Cardiovascular: negative for chest pain, dyspnea, palpitations, orthopnea, PND   Gastrointestinal: negative for nausea, vomiting, diarrhea, constipation, abdominal pain, Dysphagia, hematemesis and hematochezia   Genitourinary: negative for frequency, dysuria, nocturia, urinary incontinence, and hematuria   Integument: negative for rash, skin lesions, bruises.    Hematologic/Lymphatic: negative for easy bruising, bleeding, lymphadenopathy, or petechiae   Endocrine: negative for heat or cold intolerance,weight changes, change in bowel habits and hair loss   Musculoskeletal: negative for myalgias, arthralgias, pain, joint swelling,and bone pain   Neurological: negative for headaches, dizziness, seizures, weakness, numbness    PHYSICAL EXAM:      /62   Pulse 90   Temp 97.8 °F (36.6 °C) (Oral)   Resp 21   Ht 5' 6\" (1.676 m)   Wt 192 lb 10.9 oz (87.4 kg)   SpO2 96%   BMI 31.10 kg/m²    Temp (24hrs), Av.4 °F (36.9 °C), Min:97.7 °F (36.5 °C), Max:98.9 °F (37.2 °C)    General appearance - well appearing, no in pain or distress   Eyes - pupils equal and reactive, extraocular eye movements intact   Ears - bilateral TM's and external ear canals normal   Mouth - mucous membranes moist, pharynx normal without lesions   Neck - supple, no significant adenopathy   Lymphatics - no palpable lymphadenopathy, no hepatosplenomegaly   Chest - clear to auscultation, no wheezes, rales or rhonchi, symmetric air entry Heart - normal rate, regular rhythm, normal S1, S2, no murmurs  Abdomen - soft, nontender, nondistended, no masses or organomegaly   Neurological -awake slightly confused,, normal speech, no focal findings or movement disorder noted   Musculoskeletal - no joint tenderness, deformity or swelling   Extremities - peripheral pulses normal, no pedal edema, no clubbing or cyanosis   Skin - normal coloration and turgor, no rashes, no suspicious skin lesions noted ,    DATA:    Labs:   CBC:   Recent Labs     10/23/20  0559 10/24/20  0844   WBC 22.0* 17.6*   HGB 9.8* 10.1*   HCT 33.7* 35.6*   * 876*     BMP:   Recent Labs     10/23/20  0559 10/24/20  0844    140   K 3.7 3.5*   CO2 22 22   BUN 4* 6   CREATININE 0.40* 0.35*   LABGLOM >60 >60   GLUCOSE 153* 124*     PT/INR:   Recent Labs     10/22/20  0939 10/23/20  0559   PROTIME 10.8 11.0   INR 1.0 1.0       IMAGING DATA:  @IMG@   XR CHEST PORTABLE   Final Result   No acute findings. MRI LIMITED BRAIN   Final Result   Limited MRI of the brain demonstrates moderate acute ischemia involving both   occipital lobes and left parietal lobe involving the cortices. Small acute punctate infarct within the posterior left frontal lobe near the   convexity. Hyperintense FLAIR signal abnormality within the cortices of both frontal   lobes and posterior left temporal lobe may represent subacute ischemia or   inflammatory process such as vasculitis. Posterior reversible   leukoencephalopathy is a possibility. IR LUMBAR PUNCTURE FOR DIAGNOSIS   Final Result   Successful fluoroscopic-guided lumbar puncture.          MRI BRAIN W CONTRAST    (Results Pending)        Mri Limited Brain     Result Date: 10/23/2020  Limited MRI of the brain demonstrates moderate acute ischemia involving both occipital lobes and left parietal lobe involving the cortices Small acute punctate infarct within the posterior left frontal lobe near the convexity Hyperintense FLAIR signal mildly within the cortices of both frontal lobes and posterior left temporal lobe may represent subacute ischemia or inflammatory process such as vasculitis. Posterior reversible leukoencephalopathy is a possibility      Primary Problem  New onset seizure [facility])    Active Hospital Problems    Diagnosis Date Noted    PRES (posterior reversible encephalopathy syndrome) [I67.83]     Hemianopia, bitemporal [H53.47]     Type 2 diabetes mellitus without complication, without long-term current use of insulin ([address] [E11.9]     Anemia [D64.9]     Splenic lesion [D73.89]     Ovarian cancer ([address] [C56.9]     Acute encephalopathy [G93.40]     New onset seizure ([facility] [address] [R56.9] 10/21/2020    Malignant neoplasm of ovary ([facility] [C56.9] 08/17/2020     IMPRESSION:   1. AMS  2. Posterior reversible encephalopathy syndrome (PRES);   3. Recurrent ovarian cancer started on chemotherapy with single agent Doxil and Avastin on 9/18/2020. Patient has received only 1 cycle. Because of the recent GI bleed and vascular embolization Avastin was discontinued and second cycle of chemotherapy was scheduled on 10/23  4. Leukocytosis  5. Anemia thrombocytosis  6. RECOMMENDATIONS:  1. I reviewed the laboratory data, diagnosis, prognosis and treatment options  2. I discussed with patient and family  3. Avastin can be associated with press syndrome. This is less likely secondary to Doxil. Regardless there was a plan to hold off Avastin down the road  4. Once acute issues resolve she will resume therapy with Doxil as outpatient  5. She would need repeat MRI brain for follow-up in few weeks  6. No acute oncologic interventions indicated at this point  7. Patient will follow up with Dr. Al Karimi as outpatient after discharge  8. I had discussion with patient's family who were present with a video conference in patient's room      Discussed with patient and Nurse. Thank you for asking us to see this patient. Aryan Arreola, MD  Hematologist/Medical Oncologist    Cell: [phone]      This note is created with the assistance of a speech recognition program.  While intending to generate a document that actually reflects the content of the visit, the document can still have some errors including those of syntax and sound a like substitutions which may escape proof reading. It such instances, actual meaning can be extrapolated by contextual diversion.

## 2020-10-24 NOTE — PLAN OF CARE
Problem: Skin Integrity:  Goal: Absence of new skin breakdown  Description: Absence of new skin breakdown  Outcome: Met This Shift     Problem: Falls - Risk of:  Goal: Will remain free from falls  Description: Will remain free from falls  Outcome: Met This Shift  Goal: Absence of physical injury  Description: Absence of physical injury  Outcome: Met This Shift     Problem: Skin Integrity:  Goal: Will show no infection signs and symptoms  Description: Will show no infection signs and symptoms  Outcome: Ongoing

## 2020-10-25 LAB
ABSOLUTE EOS #: 0.18 K/UL (ref 0–0.44)
ABSOLUTE IMMATURE GRANULOCYTE: 0.18 K/UL (ref 0–0.3)
ABSOLUTE LYMPH #: 1.42 K/UL (ref 1.1–3.7)
ABSOLUTE MONO #: 1.42 K/UL (ref 0.1–1.2)
ANION GAP SERPL CALCULATED.3IONS-SCNC: 12 MMOL/L (ref 9–17)
BASOPHILS # BLD: 1 % (ref 0–2)
BASOPHILS ABSOLUTE: 0.18 K/UL (ref 0–0.2)
BUN BLDV-MCNC: 6 MG/DL (ref 6–20)
BUN/CREAT BLD: ABNORMAL (ref 9–20)
CALCIUM SERPL-MCNC: 8.4 MG/DL (ref 8.6–10.4)
CHLORIDE BLD-SCNC: 103 MMOL/L (ref 98–107)
CO2: 23 MMOL/L (ref 20–31)
CREAT SERPL-MCNC: 0.31 MG/DL (ref 0.5–0.9)
DIFFERENTIAL TYPE: ABNORMAL
EOSINOPHILS RELATIVE PERCENT: 1 % (ref 1–4)
GFR AFRICAN AMERICAN: >60 ML/MIN
GFR NON-AFRICAN AMERICAN: >60 ML/MIN
GFR SERPL CREATININE-BSD FRML MDRD: ABNORMAL ML/MIN/{1.73_M2}
GFR SERPL CREATININE-BSD FRML MDRD: ABNORMAL ML/MIN/{1.73_M2}
GLUCOSE BLD-MCNC: 128 MG/DL (ref 70–99)
HCT VFR BLD CALC: 33.3 % (ref 36.3–47.1)
HEMOGLOBIN: 9.5 G/DL (ref 11.9–15.1)
IMMATURE GRANULOCYTES: 1 %
LYMPHOCYTES # BLD: 8 % (ref 24–43)
MCH RBC QN AUTO: 25.1 PG (ref 25.2–33.5)
MCHC RBC AUTO-ENTMCNC: 28.5 G/DL (ref 28.4–34.8)
MCV RBC AUTO: 88.1 FL (ref 82.6–102.9)
MONOCYTES # BLD: 8 % (ref 3–12)
MORPHOLOGY: ABNORMAL
NRBC AUTOMATED: 0 PER 100 WBC
PDW BLD-RTO: 21 % (ref 11.8–14.4)
PLATELET # BLD: 887 K/UL (ref 138–453)
PLATELET ESTIMATE: ABNORMAL
PMV BLD AUTO: 8.9 FL (ref 8.1–13.5)
POTASSIUM SERPL-SCNC: 3.4 MMOL/L (ref 3.7–5.3)
RBC # BLD: 3.78 M/UL (ref 3.95–5.11)
RBC # BLD: ABNORMAL 10*6/UL
SEG NEUTROPHILS: 81 % (ref 36–65)
SEGMENTED NEUTROPHILS ABSOLUTE COUNT: 14.42 K/UL (ref 1.5–8.1)
SODIUM BLD-SCNC: 138 MMOL/L (ref 135–144)
WBC # BLD: 17.8 K/UL (ref 3.5–11.3)
WBC # BLD: ABNORMAL 10*3/UL

## 2020-10-25 PROCEDURE — 6370000000 HC RX 637 (ALT 250 FOR IP): Performed by: PHYSICIAN ASSISTANT

## 2020-10-25 PROCEDURE — 2580000003 HC RX 258: Performed by: STUDENT IN AN ORGANIZED HEALTH CARE EDUCATION/TRAINING PROGRAM

## 2020-10-25 PROCEDURE — 2060000000 HC ICU INTERMEDIATE R&B

## 2020-10-25 PROCEDURE — 2580000003 HC RX 258: Performed by: NURSE PRACTITIONER

## 2020-10-25 PROCEDURE — 99232 SBSQ HOSP IP/OBS MODERATE 35: CPT | Performed by: INTERNAL MEDICINE

## 2020-10-25 PROCEDURE — 80048 BASIC METABOLIC PNL TOTAL CA: CPT

## 2020-10-25 PROCEDURE — 6370000000 HC RX 637 (ALT 250 FOR IP): Performed by: FAMILY MEDICINE

## 2020-10-25 PROCEDURE — 95720 EEG PHY/QHP EA INCR W/VEEG: CPT | Performed by: PSYCHIATRY & NEUROLOGY

## 2020-10-25 PROCEDURE — 99232 SBSQ HOSP IP/OBS MODERATE 35: CPT | Performed by: PSYCHIATRY & NEUROLOGY

## 2020-10-25 PROCEDURE — C9254 INJECTION, LACOSAMIDE: HCPCS | Performed by: STUDENT IN AN ORGANIZED HEALTH CARE EDUCATION/TRAINING PROGRAM

## 2020-10-25 PROCEDURE — 6370000000 HC RX 637 (ALT 250 FOR IP): Performed by: NURSE PRACTITIONER

## 2020-10-25 PROCEDURE — 6360000002 HC RX W HCPCS: Performed by: STUDENT IN AN ORGANIZED HEALTH CARE EDUCATION/TRAINING PROGRAM

## 2020-10-25 PROCEDURE — 95708 EEG WO VID EA 12-26HR UNMNTR: CPT

## 2020-10-25 PROCEDURE — 36415 COLL VENOUS BLD VENIPUNCTURE: CPT

## 2020-10-25 PROCEDURE — 85025 COMPLETE CBC W/AUTO DIFF WBC: CPT

## 2020-10-25 PROCEDURE — 6360000002 HC RX W HCPCS: Performed by: NURSE PRACTITIONER

## 2020-10-25 PROCEDURE — 6370000000 HC RX 637 (ALT 250 FOR IP): Performed by: INTERNAL MEDICINE

## 2020-10-25 PROCEDURE — 94761 N-INVAS EAR/PLS OXIMETRY MLT: CPT

## 2020-10-25 RX ORDER — LORAZEPAM 0.5 MG/1
0.5 TABLET ORAL ONCE
Status: COMPLETED | OUTPATIENT
Start: 2020-10-25 | End: 2020-10-25

## 2020-10-25 RX ORDER — AMLODIPINE BESYLATE 5 MG/1
5 TABLET ORAL ONCE
Status: COMPLETED | OUTPATIENT
Start: 2020-10-25 | End: 2020-10-25

## 2020-10-25 RX ORDER — LACOSAMIDE 100 MG/1
200 TABLET ORAL 2 TIMES DAILY
Status: DISCONTINUED | OUTPATIENT
Start: 2020-10-25 | End: 2020-10-29 | Stop reason: HOSPADM

## 2020-10-25 RX ORDER — LEVETIRACETAM 500 MG/1
1500 TABLET ORAL 2 TIMES DAILY
Status: DISCONTINUED | OUTPATIENT
Start: 2020-10-25 | End: 2020-10-29 | Stop reason: HOSPADM

## 2020-10-25 RX ORDER — LACOSAMIDE 100 MG/1
100 TABLET ORAL ONCE
Status: COMPLETED | OUTPATIENT
Start: 2020-10-25 | End: 2020-10-25

## 2020-10-25 RX ORDER — LEVETIRACETAM 500 MG/1
500 TABLET ORAL ONCE
Status: COMPLETED | OUTPATIENT
Start: 2020-10-25 | End: 2020-10-25

## 2020-10-25 RX ORDER — AMLODIPINE BESYLATE 10 MG/1
10 TABLET ORAL DAILY
Status: DISCONTINUED | OUTPATIENT
Start: 2020-10-26 | End: 2020-10-29 | Stop reason: HOSPADM

## 2020-10-25 RX ORDER — POTASSIUM CHLORIDE 20 MEQ/1
40 TABLET, EXTENDED RELEASE ORAL PRN
Status: DISCONTINUED | OUTPATIENT
Start: 2020-10-25 | End: 2020-10-29 | Stop reason: HOSPADM

## 2020-10-25 RX ORDER — POTASSIUM CHLORIDE 7.45 MG/ML
10 INJECTION INTRAVENOUS PRN
Status: DISCONTINUED | OUTPATIENT
Start: 2020-10-25 | End: 2020-10-29 | Stop reason: HOSPADM

## 2020-10-25 RX ADMIN — ENOXAPARIN SODIUM 40 MG: 40 INJECTION SUBCUTANEOUS at 08:36

## 2020-10-25 RX ADMIN — DEXTROSE MONOHYDRATE 100 MG: 50 INJECTION, SOLUTION INTRAVENOUS at 08:36

## 2020-10-25 RX ADMIN — LEVETIRACETAM 500 MG: 500 TABLET, FILM COATED ORAL at 11:35

## 2020-10-25 RX ADMIN — LACOSAMIDE 200 MG: 100 TABLET, FILM COATED ORAL at 20:03

## 2020-10-25 RX ADMIN — ACETAMINOPHEN 650 MG: 325 TABLET ORAL at 10:59

## 2020-10-25 RX ADMIN — LORAZEPAM 0.5 MG: 0.5 TABLET ORAL at 20:03

## 2020-10-25 RX ADMIN — LEVETIRACETAM 1000 MG: 10 INJECTION INTRAVENOUS at 04:05

## 2020-10-25 RX ADMIN — LEVETIRACETAM 1500 MG: 500 TABLET, FILM COATED ORAL at 20:03

## 2020-10-25 RX ADMIN — LORAZEPAM 0.5 MG: 0.5 TABLET ORAL at 01:48

## 2020-10-25 RX ADMIN — AMLODIPINE BESYLATE 5 MG: 5 TABLET ORAL at 11:34

## 2020-10-25 RX ADMIN — LACOSAMIDE 100 MG: 100 TABLET, FILM COATED ORAL at 11:34

## 2020-10-25 RX ADMIN — AMLODIPINE BESYLATE 5 MG: 5 TABLET ORAL at 08:36

## 2020-10-25 RX ADMIN — SODIUM CHLORIDE, PRESERVATIVE FREE 10 ML: 5 INJECTION INTRAVENOUS at 20:06

## 2020-10-25 ASSESSMENT — PAIN SCALES - GENERAL: PAINLEVEL_OUTOF10: 5

## 2020-10-25 NOTE — PROGRESS NOTES
[facility]  Office: [address], DO, Julia Pérezer, DO, Gabby Manifold, DO, Pritesh Luna Blood, DO, Maddi Rangel MD, Dean Hinojosa MD, Moshe Nguyen MD, Casey Neff MD, Hany Aburto MD, Sarah Baig MD, Alison Phan MD, Erasmo Galvin MD, Mena Rodgers MD, Sonido Matthews, DO, Hedy Pinon MD, Geoff Bermudez MD, Patricia August DO, Evan Mccarthy MD,  Jaquan White DO, Norris Holter, MD, Annelise Lacy MD, Consuelo Dumont, CNP, [facility] Sadia, CNP, Neris Live, CNP, Ben Robles, CNS, Adis Stark, CNP, Saad Carmona, CNP, Liana Guzman, CNP, Chuy Byers, CNP, Sami Elena, CNP, Cristiano Fernández PA-C, Stan Becker DNP, Fanny Arias, CNP, Anu Cano, CNP, Lilian Chaudhary, CNP, Hunter Dewitt, CNP, Becky Reyes, CNP         [facility]   [address] [facility]    Progress Note    10/25/2020    8:51 AM    Name:   Erika Wade  MRN:     [id number]     Acct:      [de-identified]   Room:   4199/2365-50   Day:  3  Admit Date:  10/22/2020 12:20 AM    PCP:   No primary care provider on file. Code Status:  Full Code    Subjective:     C/C: AMS     Interval History Status: improved. No issues overnight  Mentation improved/stable  BP slightly elevated  Increased norvasc  No allergies per patient  No other complaints  Discussed with RN    Brief History:     60 F presented with seizure episode. Patient currently lethargic, not cooperating with questions. Answering intermittently, disoriented to place. On chart review initially presented to [address] [place] [address] with confusion. Noted to have seizure episode, no prior history of seizures. Started on antibiotics, transferred to [facility] for further neurological workup.      Review of Systems:     Constitutional:  negative for chills, fevers, sweats  Respiratory:  negative for cough, dyspnea on exertion, shortness of breath  Cardiovascular:  negative for chest pain, chest pressure/discomfort  Gastrointestinal:  negative for abdominal pain, constipation, diarrhea, nausea, vomiting  Neurological:  negative for dizziness, headache    Medications: Allergies:  No Known Allergies    Current Meds:   Scheduled Meds:    lacosamide (VIMPAT) IVPB  100 mg Intravenous BID    levetiracetam  1,000 mg Intravenous Q12H    amLODIPine  5 mg Oral Daily    LORazepam  2 mg Intravenous Once    enoxaparin  40 mg Subcutaneous Daily    sodium chloride flush  10 mL Intravenous 2 times per day    lidocaine PF  5 mL Intradermal Once     Continuous Infusions:    sodium chloride 75 mL/hr at 10/22/20 2324     PRN Meds: loperamide, labetalol, [DISCONTINUED] acetaminophen **OR** acetaminophen, LORazepam, nicotine, polyethylene glycol, promethazine **OR** ondansetron, sodium chloride flush, acetaminophen    Data:     Past Medical History:   has a past medical history of Anemia, Bleeding, Cervical cancer ([facility] [address] Depression, Diabetes mellitus ([facility] GERD (gastroesophageal reflux disease), Metastatic cancer ([facility] Ovarian cancer ([facility] Post chemo evaluation, and Splenic lesion. Social History:   reports that she has been smoking. She has been smoking about 1.00 pack per day. She uses smokeless tobacco. She reports previous alcohol use. She reports that she does not use drugs. Family History:   Family History   Problem Relation Age of Onset    Alcohol Abuse Mother     Cirrhosis Mother        Vitals:  BP (!) 166/74   Pulse 89   Temp 98.3 °F (36.8 °C) (Oral)   Resp 23   Ht 5' 6\" (1.676 m)   Wt 192 lb 10.9 oz (87.4 kg)   SpO2 96%   BMI 31.10 kg/m²   Temp (24hrs), Av °F (36.7 °C), Min:97.8 °F (36.6 °C), Max:98.3 °F (36.8 °C)    No results for input(s): POCGLU in the last 72 hours. I/O (24Hr):   No intake or output data in the 24 hours ending 10/25/20 0853    Labs:  Hematology:  Recent Labs     10/22/20  0939 10/23/20  0559 10/24/20  0844   WBC 24.2* 22.0* 17.6*   RBC 3.88* 3.88* 4.04   HGB 10.0* 9.8* 10.1*   HCT 32.4* 33.7* 35.6*   MCV 83.5 86.9 88.1   MCH 25.8 25.3 25.0*   MCHC 30.9 29.1 28.4   RDW 21.2* 21.1* 21.2*   * 741* 876*   MPV 8.8 9.1 9.3   CRP 52.2*  --   --    INR 1.0 1.0  --      Chemistry:  Recent Labs     10/22/20  0939 10/23/20  0559 10/24/20  0844   * 136 140   K 3.4* 3.7 3.5*    100 104   CO2 20 22 22   GLUCOSE 107* 153* 124*   BUN 4* 4* 6   CREATININE 0.40* 0.40* 0.35*   ANIONGAP 12 14 14   LABGLOM >60 >60 >60   GFRAA >60 >60 >60   CALCIUM 8.7 8.8 9.2     Recent Labs     10/22/20  0939 10/22/20  1605 10/23/20  0559   PROT 6.5  --  6.7   LABALBU 3.1* 3.3* 3.1*   TSH 0.93  --   --    AST 9  --  10   ALT 5  --  5   ALKPHOS 134*  --  126*   BILITOT <0.10*  --  <0.10*   AMMONIA 63*  --   --      ABG:No results found for: POCPH, PHART, PH, POCPCO2, FSA9GDC, PCO2, POCPO2, PO2ART, PO2, POCHCO3, UWQ8SAR, HCO3, NBEA, PBEA, BEART, BE, THGBART, THB, PZN0XBO, ZZAT9ENH, G6ZZWEOF, O2SAT, FIO2  Lab Results   Component Value Date/Time    SPECIAL RIGHT HAND AMOUNT UNKNOWN 10/23/2020 01:08 AM    SPECIAL LT HAND AMOUNT UNKNOWN 10/23/2020 01:08 AM     Lab Results   Component Value Date/Time    CULTURE NO GROWTH 2 DAYS 10/23/2020 01:08 AM    CULTURE NO GROWTH 2 DAYS 10/23/2020 01:08 AM       Radiology:  Ir Lumbar Puncture For Diagnosis    Result Date: 10/22/2020  Successful fluoroscopic-guided lumbar puncture. Mri Limited Brain    Result Date: 10/23/2020  Limited MRI of the brain demonstrates moderate acute ischemia involving both occipital lobes and left parietal lobe involving the cortices Small acute punctate infarct within the posterior left frontal lobe near the convexity Hyperintense FLAIR signal mildly within the cortices of both frontal lobes and posterior left temporal lobe may represent subacute ischemia or inflammatory process such as vasculitis.   Posterior reversible leukoencephalopathy is a possibility       Physical Examination:        General appearance:  alert, cooperative and no distress  Mental Status:  oriented to person, intermittently confused   Lungs:  clear to auscultation bilaterally, normal effort  Heart:  regular rate and rhythm  Abdomen:  soft, nontender, nondistended, normal bowel sounds  Extremities:  no edema, redness, tenderness in the calves  Skin:  no gross lesions, rashes, induration    Assessment:        Hospital Problems           Last Modified POA    * (Principal) New onset seizure ([address] 10/22/2020 Yes    Malignant neoplasm of ovary ([address] 10/22/2020 Yes    Type 2 diabetes mellitus without complication, without long-term current use of insulin ([address] 10/22/2020 Yes    Anemia 10/22/2020 Yes    Splenic lesion 10/22/2020 Yes    Ovarian cancer ([address] 10/22/2020 Yes    Overview Signed 10/22/2020  1:05 AM by ESTRELLITA Silva - CNP     low grade serous ovarian carcinoma         Acute encephalopathy 10/22/2020 Yes    PRES (posterior reversible encephalopathy syndrome) 10/23/2020 Yes    Hemianopia, bitemporal 10/23/2020 Yes          Plan:        - Neurology consulted - continue LTME, added vimpat  - Check MRI - demonstrating CVA - discussed with Dr Vahe Ch - more concerning for PRES  - LTME per neurology  - LP per IR - negative   - Continue empiric antibiotics for now  - Seizure precautions   - Neuro checks  - Monitor glucose, continue insulin correction   - Increased norvasc given elevated BP  - DC fluids  - Further AED adjustments, LTME per neurology       Maira Manzo MD  10/25/2020  8:51 AM

## 2020-10-25 NOTE — PROGRESS NOTES
NEUROLOGY INPATIENT PROGRESS NOTE    10/25/2020         Subjective: Janelle Spivey is a  60 y.o. female admitted on 10/22/2020 with New onset seizure [facility]) [R56.9]    Briefly, this is a  60 y.o. female admitted on 10/22/2020 as a transfer from [facility].  She initially presented there with concerns of confusion after receiving vaccinations (influenza and pneumococcal). While at [facility] she reportedly had an episode of seizure, generalized clonic in nature. She was given Ativan and loaded with Keppra. Initial lab work showed marked leukocytosis and patient was given antibiotics for suspected CNS infection. Upon arrival at [facility], patient continued to be lethargic and somnolent. No nuchal rigidity was initially noted however patient did have low-grade fever requiring Tylenol. Patient underwent an lumbar puncture which did not reveal any CNS infection. MRI brain had findings suspicious for PRES. Patient was started on LTME. ID and heme-onc consult obtained. Of note, patient has a history of ovarian cancer with initial diagnosis in 2005 with multiple recurrence. Follows with Dr. Mirna Menjivar as outpatient. She was recently started on Doxil and Avastin on 9/18/2020. She received 1 dose of Avastin. This was discontinued due to recent GI bleeding at [facility] for which patient underwent embolization by IR for intra-abdominal bleeding due to splenic mass with GI infiltration. She received approximately 5 units of PRBC. Per heme-onc consult note, Avastin can be associated with PRES less likely secondary to Doxil. Avastin was discontinued anyhow and plan at this time is to resume with Doxil once able as outpatient. Today, she reports no acute issues overnight. Slept more comfortably. Denies any seizure activity. Mentation has improved compared to initial presentation. No current facility-administered medications on file prior to encounter.       Current Outpatient Medications Splenic lesion        Past Surgical History:   Procedure Laterality Date    ABSCESS DRAINAGE  2013    Franca rectal    ANUS SURGERY      ANAL FISSURECTOMY    COLECTOMY  03/2013    ex lap, tumor debulking, transverse colectomy w reanastamosis, subgastric omentectomy, intraperitoneal port placement    HYSTERECTOMY, TOTAL ABDOMINAL      IR EMBOLIZATION HEMORRHAGE  10/05/2020    intra-abdominal bleeding -due to splenic mass with GI infiltration. Status post embolization [employer] interlock coils x7. mri condtional 3t ok, safe immediately post implant.  IR PORT PLACEMENT EQUAL OR GREATER THAN 5 YEARS  8/24/2020    IR PORT PLACEMENT EQUAL OR GREATER THAN 5 YEARS 8/24/2020 Gabby Leon MD [facility]    PORT SURGERY      IP Port    TONSILLECTOMY         Medications:     lacosamide (VIMPAT) IVPB  100 mg Intravenous BID    levetiracetam  1,000 mg Intravenous Q12H    amLODIPine  5 mg Oral Daily    LORazepam  2 mg Intravenous Once    enoxaparin  40 mg Subcutaneous Daily    sodium chloride flush  10 mL Intravenous 2 times per day    lidocaine PF  5 mL Intradermal Once     PRN Meds include: loperamide, labetalol, [DISCONTINUED] acetaminophen **OR** acetaminophen, LORazepam, nicotine, polyethylene glycol, promethazine **OR** ondansetron, sodium chloride flush, acetaminophen    Objective:   /62   Pulse 86   Temp 98.1 °F (36.7 °C) (Oral)   Resp 26   Ht 5' 6\" (1.676 m)   Wt 192 lb 10.9 oz (87.4 kg)   SpO2 96%   BMI 31.10 kg/m²     Blood pressure range: Systolic (84AFE), HCJ:689 , Min:127 , NTC:385   ; Diastolic (98BUL), NMP:59, Min:62, Max:67      ROS:  CONSTITUTIONAL: negative for fatigue and malaise   EYES: Positive for vision changes.  Negative for double vision and photophobia    HEENT: negative for tinnitus and sore throat   RESPIRATORY: negative for cough, shortness of breath   CARDIOVASCULAR: negative for chest pain, palpitations, or syncope   GASTROINTESTINAL: negative for abdominal pain, nausea, vomiting, diarrhea, or constipation    GENITOURINARY: negative for incontinence or retention    MUSCULOSKELETAL: negative for neck or back pain, negative for extremity pain   NEUROLOGICAL: Negative for seizures, headaches, weakness, numbness, confusion, aphasia, dysarthria   PSYCHIATRIC: negative for agitation, hallucination, SI/HI   SKIN Negative for spontaneous contusions, rashes, or lesions        NEUROLOGIC EXAMINATION  GENERAL  Appears comfortable and in no distress   HEENT  NC/ AT   HEART  S1 and S2 heard; palpation of pulses: radial pulse    NECK  Supple and no bruits heard   MENTAL STATUS:  Alert, oriented, intact memory, no confusion, normal speech, normal language, no hallucination or delusion   CRANIAL NERVES: II     -      Bitemporal hemianopsia  III,IV,VI -  PERR, EOMs full, no ptosis  V     -     Normal facial sensation   VII    -     Normal facial symmetry  VIII   -     Intact hearing   IX,X -     Symmetrical palate  XI    -     Symmetrical shoulder shrug  XII   -     Midline tongue, no atrophy    MOTOR FUNCTION: RUE: Significant for good strength of grade 5/5 in proximal and distal muscle groups   LUE: Significant for good strength of grade 5/5 in proximal and distal muscle groups   RLE: Significant for good strength of grade 5/5 in proximal and distal muscle groups   LLE: Significant for good strength of grade 5/5 in proximal and distal muscle groups      Normal bulk, normal tone and no involuntary movements, no tremor   SENSORY FUNCTION:  Normal touch, normal pin, normal vibration, normal proprioception   CEREBELLAR FUNCTION:  Intact fine motor control over upper limbs and lower limbs   REFLEX FUNCTION:  Symmetric in upper and lower extremities, no Babinski sign   STATION and GAIT  Not tested, connected with vEEG     Data:    Lab Results:   CBC:   Recent Labs     10/22/20  0939 10/23/20  0559 10/24/20  0844   WBC 24.2* 22.0* 17.6*   HGB 10.0* 9.8* 10.1*   * 741* 876* BMP:    Recent Labs     10/22/20  0939 10/23/20  0559 10/24/20  0844   * 136 140   K 3.4* 3.7 3.5*    100 104   CO2 20 22 22   BUN 4* 4* 6   CREATININE 0.40* 0.40* 0.35*   GLUCOSE 107* 153* 124*         Lab Results   Component Value Date    ALT 5 10/23/2020    AST 10 10/23/2020    TSH 0.93 10/22/2020    INR 1.0 10/23/2020    [id number] 405 10/22/2020       No results found for: PHENYTOIN, PHENYTOIN, VALPROATE, CBMZ    IMAGING:  \"Care Everywhere Tab\"  CT Head [facility]) - \"Negative brain CT. \"  CTA Head and Neck with contrast [facility]) -   \"Negative CT angiogram [place]. \"  \"Essentially negative CT angiogram cervical portion of the carotid arteries. \"    XR CHEST PORTABLE   Final Result   No acute findings. MRI LIMITED BRAIN   Final Result   Limited MRI of the brain demonstrates moderate acute ischemia involving both   occipital lobes and left parietal lobe involving the cortices. Small acute punctate infarct within the posterior left frontal lobe near the   convexity. Hyperintense FLAIR signal abnormality within the cortices of both frontal   lobes and posterior left temporal lobe may represent subacute ischemia or   inflammatory process such as vasculitis. Posterior reversible   leukoencephalopathy is a possibility. IR LUMBAR PUNCTURE FOR DIAGNOSIS   Final Result   Successful fluoroscopic-guided lumbar puncture. LTME started 10/22/20 @ 18:57 reviewed through 10/25/20 @ 07:30  See full report for complete details  Following is report from Day 4  Summary: During this day of recording no events were recorded. The interictal EEG was abnormal due to diffuse polymorphic delta and theta slowing suggesting moderate to severe encephalopathy. Near continuous left central, parietal and temporal sharp wave discharges with fast activity c/w LPDs+F+R conferred increased risk for focal onset seizures.  Monitoring was continued in order to record the patient's typical events. The EKG channel revealed no abnormalities.        DEVON DUBOSE MD  Diplomate, American Board of Psychiatry and Neurology  Diplomate, American Board of Clinical Neurophysiology  Diplomate, American Board of Epilepsy     Assessment and Plan  78-year-old female with recurrent ovarian cancer initially admitted for confusion and new onset seizures. Likely PRES (posterior reversible encephalopathy syndrome), secondary to immunosuppression, with a possible adverse effect more likely due to Avastin which has been already discontinued due to GI bleed per oncology note, with a lesser possibility secondary to Doxil. Heme/onc recommendations noted and appreciated. PRES also could be secondary to recent blood pressure elevations. Presently on amlodipine 5mg  Focal onset seizures secondary to PRES -continue LTME. Increase Keppra to 1.5 g twice daily and Vimpat to 200 mg twice daily. Changed to PO dosing. Seizure precautions  Leukocytosis with no identifiable source at this time. ID recommendations noted and appreciated. Will monitor off antibiotics at this time  DVT prophylaxis    Further recommendations may follow after discussing with attending physician.       Adelina Yanez MD   PGY-2 Neurology  10/25/2020  7:54 AM

## 2020-10-25 NOTE — PROGRESS NOTES
infiltration. She received approximately 5 units of blood for marked anemia. CURRENT EVALUATION: 10/24/2020    No fever or chills - no nausea, vomiting,   2 diarrhea episodes tonight not foul     abd spft   No events over night  WBC better 17  [employer] neg      Patient currently receiving Vancomycin, ceftriaxone, and acyclovir. She has no symptoms of bacterial meningitis or herpes encephalitis. Antimicrobials D/C. Labs, X rays reviewed: 10/24/2020    BUN: 4  Cr: 0.4    WBC: 24.2-->22 - 17  Hb: 10-->9.8  Plat: 691-->741     Cultures:  Urine:  ·   Blood:  · 10-23-20: no growth   Sputum :  ·   Wound:  ·   CSF:  · 10-23-20:  No growth   · Molecular/PCR of CSF 10-23-20: negative     Cryptococcal Antigen, CSF negative       I have personally reviewed the past medical history, past surgical history, medications, social history, and family history, and I have updated the database accordingly. Past Medical History:     Past Medical History:   Diagnosis Date    Anemia     Bleeding 10/2020    intra-abdominal bleeding -due to splenic mass with GI infiltration. Status post embolization    [facility]     Depression     Diabetes mellitus ([facility]     GERD (gastroesophageal reflux disease)     Metastatic cancer ([facility] 10/2020    extensive intraabdominal and splenic involvement and lung mets.     Ovarian cancer ([facility]     low grade serous ovarian carcinoma    Post chemo evaluation     2007: Chemo via med onc (Dr. Kendrick Wade), 2008: Ruby Osler due to rising CA-125, 2013: intraperitoneal chemo,12/2015: Ca125 - 25     Splenic lesion        Past Surgical  History:     Past Surgical History:   Procedure Laterality Date    ABSCESS DRAINAGE  2013    Franca rectal    ANUS SURGERY      ANAL FISSURECTOMY    COLECTOMY  03/2013    ex lap, tumor debulking, transverse colectomy w reanastamosis, subgastric omentectomy, intraperitoneal port placement    HYSTERECTOMY, TOTAL ABDOMINAL      IR EMBOLIZATION HEMORRHAGE  10/05/2020 intra-abdominal bleeding -due to splenic mass with GI infiltration. Status post embolization [employer] interlock coils x7. mri condtional 3t ok, safe immediately post implant.     IR PORT PLACEMENT EQUAL OR GREATER THAN 5 YEARS  8/24/2020    IR PORT PLACEMENT EQUAL OR GREATER THAN 5 YEARS 8/24/2020 Vilma Lazar MD [facility]    PORT SURGERY      IP Port    TONSILLECTOMY         Medications:      lacosamide (VIMPAT) IVPB  100 mg Intravenous BID    levetiracetam  1,000 mg Intravenous Q12H    LORazepam  0.5 mg Intravenous Once    amLODIPine  5 mg Oral Daily    LORazepam  2 mg Intravenous Once    enoxaparin  40 mg Subcutaneous Daily    sodium chloride flush  10 mL Intravenous 2 times per day    lidocaine PF  5 mL Intradermal Once    LORazepam  2 mg Intravenous Once       Social History:     Social History     Socioeconomic History    Marital status: Single     Spouse name: Not on file    Number of children: Not on file    Years of education: Not on file    Highest education level: Not on file   Occupational History    Not on file   Social Needs    Financial resource strain: Not on file    Food insecurity     Worry: Not on file     Inability: Not on file    Transportation needs     Medical: Not on file     Non-medical: Not on file   Tobacco Use    Smoking status: Current Every Day Smoker     Packs/day: 1.00    Smokeless tobacco: Current User   Substance and Sexual Activity    Alcohol use: Not Currently    Drug use: Never    Sexual activity: Not on file   Lifestyle    Physical activity     Days per week: Not on file     Minutes per session: Not on file    Stress: Not on file   Relationships    Social connections     Talks on phone: Not on file     Gets together: Not on file     Attends Congregation service: Not on file     Active member of club or organization: Not on file     Attends meetings of clubs or organizations: Not on file     Relationship status: Not on file   Abner Andres Intimate partner violence     Fear of current or ex partner: Not on file     Emotionally abused: Not on file     Physically abused: Not on file     Forced sexual activity: Not on file   Other Topics Concern    Not on file   Social History Narrative    Not on file       Family History:     Family History   Problem Relation Age of Onset    Alcohol Abuse Mother     Cirrhosis Mother         Allergies:   Patient has no known allergies. Review of Systems:   Review of Systems   Constitutional: Negative for activity change and appetite change. HENT: Negative for congestion. Eyes: Negative for discharge and itching. Respiratory: Negative for apnea. Cardiovascular: Negative for chest pain. Gastrointestinal: Negative for abdominal distention. Endocrine: Negative for polyuria. Genitourinary: Negative for difficulty urinating. Musculoskeletal: Negative for arthralgias. Skin: Negative for color change. Allergic/Immunologic: Negative for immunocompromised state. Neurological: Negative for dizziness. Hematological: Negative for adenopathy. Psychiatric/Behavioral: Negative for agitation. Physical Examination :     Patient Vitals for the past 8 hrs:   BP Temp Temp src Pulse Resp SpO2   10/24/20 2020 -- 97.9 °F (36.6 °C) Oral -- -- --   10/24/20 1727 130/67 97.8 °F (36.6 °C) Oral 90 21 93 %     Physical Exam  Constitutional:       General: She is not in acute distress. Appearance: Normal appearance. She is not ill-appearing. HENT:      Head: Normocephalic and atraumatic. Nose: Nose normal. No congestion. Mouth/Throat:      Mouth: Mucous membranes are moist.   Eyes:      General: No scleral icterus. Conjunctiva/sclera: Conjunctivae normal.      Pupils: Pupils are equal, round, and reactive to light. Neck:      Musculoskeletal: Neck supple. No neck rigidity. Cardiovascular:      Rate and Rhythm: Normal rate and regular rhythm. Heart sounds: Normal heart sounds.  No murmur. Pulmonary:      Effort: No respiratory distress. Breath sounds: Normal breath sounds. No wheezing. Abdominal:      General: There is no distension. Palpations: Abdomen is soft. Tenderness: There is no abdominal tenderness. Genitourinary:     Comments: Urine cristiano  Musculoskeletal:         General: No swelling or deformity. Skin:     General: Skin is dry. Coloration: Skin is not jaundiced. Neurological:      General: No focal deficit present. Mental Status: She is alert and oriented to person, place, and time. Cranial Nerves: No cranial nerve deficit. Psychiatric:         Mood and Affect: Mood normal.         Thought Content: Thought content normal.          Medical Decision Making -Laboratory:   I have independently reviewed/ordered the following labs:    CBC with Differential:   Recent Labs     10/22/20  0939 10/23/20  0559 10/24/20  0844   WBC 24.2* 22.0* 17.6*   HGB 10.0* 9.8* 10.1*   HCT 32.4* 33.7* 35.6*   * 741* 876*   LYMPHOPCT 10*  --  7*   MONOPCT 5  --  6     BMP:   Recent Labs     10/23/20  0559 10/24/20  0844    140   K 3.7 3.5*    104   CO2 22 22   BUN 4* 6   CREATININE 0.40* 0.35*     Hepatic Function Panel:   Recent Labs     10/22/20  0939 10/22/20  1605 10/23/20  0559   PROT 6.5  --  6.7   LABALBU 3.1* 3.3* 3.1*   BILITOT <0.10*  --  <0.10*   ALKPHOS 134*  --  126*   ALT 5  --  5   AST 9  --  10     No results for input(s): RPR in the last 72 hours. No results for input(s): HIV in the last 72 hours. No results for input(s): BC in the last 72 hours.   Lab Results   Component Value Date    MUCUS NOT REPORTED 10/24/2020    RBC 4.04 10/24/2020    TRICHOMONAS NOT REPORTED 10/24/2020    WBC 17.6 10/24/2020    YEAST NOT REPORTED 10/24/2020    TURBIDITY TURBID 10/24/2020     Lab Results   Component Value Date    CREATININE 0.35 10/24/2020    GLUCOSE 124 10/24/2020           Darek Montaño MD -

## 2020-10-25 NOTE — PROCEDURES
LONG-TERM EEG-VIDEO [id number] [address]    Patient: Soren Joseph [facility]  Age: 60 y.o. MRN: [id number]    Referring Physician: Ralph Clements  History: The patient is a 60 y.o. female who presented breakthrough seizure/encephalopathy. This long-term video-EEG monitoring study was performed to determine the nature of the patient's clinical events. The patient is on neuroactive medications.    Soren Joseph Massimoluna   Current Facility-Administered Medications   Medication Dose Route Frequency Provider Last Rate Last Dose    lacosamide (VIMPAT) 100 mg in dextrose 5 % 60 mL IVPB  100 mg Intravenous BID Johnathan Cabral MD   Stopped at 10/24/20 2113    loperamide (IMODIUM) capsule 2 mg  2 mg Oral 4x Daily PRN Rochele Locket, APRN - CNP   2 mg at 10/24/20 2150    levetiracetam (KEPPRA) 1000 mg/100 mL IVPB  1,000 mg Intravenous Q12H Hina Rehman MD   Stopped at 10/25/20 0420    amLODIPine (NORVASC) tablet 5 mg  5 mg Oral Daily Dylan Whiting MD   5 mg at 10/24/20 0839    labetalol (NORMODYNE;TRANDATE) injection 5 mg  5 mg Intravenous Q6H PRN Dylan Whiting MD        LORazepam (ATIVAN) injection 2 mg  2 mg Intravenous Once Delonte Chirri, DO        0.9 % sodium chloride infusion   Intravenous Continuous Frebaa Katjaxson, APRN - CNP 75 mL/hr at 10/22/20 2324      acetaminophen (TYLENOL) suppository 650 mg  650 mg Rectal Q6H PRN Fredda Katos, APRN - CNP   650 mg at 10/23/20 0145    enoxaparin (LOVENOX) injection 40 mg  40 mg Subcutaneous Daily Fredda Katos, APRN - CNP   40 mg at 10/24/20 3896    LORazepam (ATIVAN) injection 2 mg  2 mg Intravenous Q4H PRN Fredda Katjaxson, APRN - CNP        nicotine (NICODERM CQ) 21 MG/24HR 1 patch  1 patch Transdermal Daily PRN Fredda Katos, APRN - CNP        polyethylene glycol (GLYCOLAX) packet 17 g  17 g Oral Daily PRN Fredda Katos, APRN - CNP        promethazine (PHENERGAN) tablet 12.5 mg 12.5 mg Oral Q6H PRN Liberal Burner, APRN - CNP        Or    ondansetron [facility]) injection 4 mg  4 mg Intravenous Q6H PRN Viraj Burner, APRN - CNP        sodium chloride flush 0.9 % injection 10 mL  10 mL Intravenous 2 times per day Viraj Burner, APRN - CNP   10 mL at 10/24/20 2043    sodium chloride flush 0.9 % injection 10 mL  10 mL Intravenous PRN Viraj Burner, APRN - CNP        lidocaine PF 2 % injection 5 mL  5 mL Intradermal Once Rafat Junior MD        acetaminophen (TYLENOL) tablet 650 mg  650 mg Oral Q4H PRN [facility] Bowels, [place]   650 mg at 10/24/20 2052     Technical Description: This is a 21-channel digital EEG recording with time-locked video. Electrodes were placed in accordance with the 10-20 International System of Electrode Placement. Single lead EKG monitoring was included. Baseline EEG Recording:  A formal baseline EEG recording was not obtained. Day 1 - 10/22/20, starting at 18:57    Interictal EEG Samples: The background activity of the right hemisphere consisted of 4 to 5 Hz of polymorphic delta and theta activity of 30 to 40 µV. The activity over the left hemisphere was slower consisted of 3 to 4 Hz of polymorphic delta activity of high amplitude. Frequent and near continuous left hemispheric sharp waves were seen maximal at C3, P3 and T5, often occurring in a periodic pattern with superimposed fast activity. The EKG channel revealed no abnormalities. Ictal EEG Recording / Patient Events: During this period the patient had no events or seizures. Summary: During this day of recording no events were recorded. The interictal EEG was abnormal due to diffuse polymorphic delta and theta slowing suggesting moderate to severe encephalopathy. Near continuous left central, parietal and temporal sharp wave discharges with fast activity conferred increased risk for focal onset seizures. Monitoring was continued in order to record the patient's typical events.  The EKG channel revealed no abnormalities. Day 2 - 10 /23/20    Interictal EEG Samples: Interictal EEG was unchanged from yesterday. Ictal EEG Recording / Patient Events: During this period the patient had multiple subclinical seizures the seizures originated over the left posterior quadrant rhythmic 3 to 4 Hz of delta activity over left posterior quadrant which then evolved into higher amplitude 2-3 Hz delta activity involving left frontal and central leads. In a representative time epoch between 6 pm and 00:00, the patient had 9 seizures, lasting 15-25 seconds. Summary: During this day of recording the patient had multiple subclinical seizures originating over the left posterior quadrant with rhythmic 3-4 Hz of delta activity and then spreading across the left hemisphere. The interictal EEG was abnormal due to diffuse polymorphic delta and theta slowing suggesting moderate to severe encephalopathy. Near continuous left central, parietal and temporal sharp wave discharges with fast activity c/w LPDs+F+R conferred increased risk for focal onset seizures. Monitoring was continued in order to record the patient's typical events. The EKG channel revealed no abnormalities. Day 3 - 10/24/20    Interictal EEG Samples: Interictal EEG was unchanged from yesterday. Ictal EEG Recording / Patient Events: During this period the patient had following events    In a representative time epoch between 00:00 and 06:00 am, the patient had 10 seizures, lasting 15-25 seconds. In a representative time epoch between 06:00 and 12:00 pm, the patient had 4 seizures. In a representative time epoch between 12:00pm and 6:00 pm, the patient had 9 seizures.      In a representative time epoch between 6 pm and 12:00, the patient had 2 seizures, last seizure was 7:06 pm.    Summary: During this day of recording the patient had multiple subclinical seizures originating over the left posterior quadrant with rhythmic 3-4 Hz of delta activity and then spreading across the left hemisphere. The interictal EEG was abnormal due to diffuse polymorphic delta and theta slowing suggesting moderate to severe encephalopathy. Near continuous left central, parietal and temporal sharp wave discharges with fast activity c/w LPDs+F+R conferred increased risk for focal onset seizures. Monitoring was continued in order to record the patient's typical events. The EKG channel revealed no abnormalities. Day 4 - 10/25/20, reviewed through 7:30 am    Interictal EEG Samples: Interictal EEG was unchanged from yesterday. Ictal EEG Recording / Patient Events: During this period the patient had no events or seizures. Summary: During this day of recording no events were recorded. The interictal EEG was abnormal due to diffuse polymorphic delta and theta slowing suggesting moderate to severe encephalopathy. Near continuous left central, parietal and temporal sharp wave discharges with fast activity c/w LPDs+F+R conferred increased risk for focal onset seizures. Monitoring was continued in order to record the patient's typical events. The EKG channel revealed no abnormalities. Delfino Hunter MD  Diplomate, American Board of Psychiatry and Neurology  Diplomate, American Board of Clinical Neurophysiology  Diplomate, American Board of Epilepsy     Please note this is a preliminary report and updated daily. The final report will have a summary of behavior and electrographic findings with clinical correlation.

## 2020-10-26 LAB
ABSOLUTE EOS #: 0.34 K/UL (ref 0–0.44)
ABSOLUTE IMMATURE GRANULOCYTE: 0 K/UL (ref 0–0.3)
ABSOLUTE LYMPH #: 1.7 K/UL (ref 1.1–3.7)
ABSOLUTE MONO #: 1.36 K/UL (ref 0.1–1.2)
ANION GAP SERPL CALCULATED.3IONS-SCNC: 13 MMOL/L (ref 9–17)
BASOPHILS # BLD: 1 % (ref 0–2)
BASOPHILS ABSOLUTE: 0.17 K/UL (ref 0–0.2)
BUN BLDV-MCNC: 4 MG/DL (ref 6–20)
BUN/CREAT BLD: ABNORMAL (ref 9–20)
CALCIUM SERPL-MCNC: 8.9 MG/DL (ref 8.6–10.4)
CHLORIDE BLD-SCNC: 104 MMOL/L (ref 98–107)
CO2: 21 MMOL/L (ref 20–31)
CREAT SERPL-MCNC: 0.31 MG/DL (ref 0.5–0.9)
CSF ISOELECTRIC FOCUSING INTERPRETATION: NORMAL
DIFFERENTIAL TYPE: ABNORMAL
EOSINOPHILS RELATIVE PERCENT: 2 % (ref 1–4)
GFR AFRICAN AMERICAN: >60 ML/MIN
GFR NON-AFRICAN AMERICAN: >60 ML/MIN
GFR SERPL CREATININE-BSD FRML MDRD: ABNORMAL ML/MIN/{1.73_M2}
GFR SERPL CREATININE-BSD FRML MDRD: ABNORMAL ML/MIN/{1.73_M2}
GLUCOSE BLD-MCNC: 126 MG/DL (ref 70–99)
HCT VFR BLD CALC: 32.7 % (ref 36.3–47.1)
HEMOGLOBIN: 9.4 G/DL (ref 11.9–15.1)
IMMATURE GRANULOCYTES: 0 %
INTERVENTION: NORMAL
LYMPHOCYTES # BLD: 10 % (ref 24–43)
MCH RBC QN AUTO: 24.9 PG (ref 25.2–33.5)
MCHC RBC AUTO-ENTMCNC: 28.7 G/DL (ref 28.4–34.8)
MCV RBC AUTO: 86.5 FL (ref 82.6–102.9)
MONOCYTES # BLD: 8 % (ref 3–12)
MORPHOLOGY: ABNORMAL
MYELIN BASIC PROTEIN, CSF: 2.16 NG/ML (ref 0–5.5)
NRBC AUTOMATED: 0 PER 100 WBC
OLIGOCLONAL BANDS NUMBER: 0 BANDS (ref 0–1)
OLIGOCLONAL BANDS: NEGATIVE
PDW BLD-RTO: 21 % (ref 11.8–14.4)
PLATELET # BLD: 907 K/UL (ref 138–453)
PLATELET ESTIMATE: ABNORMAL
PMV BLD AUTO: 8.7 FL (ref 8.1–13.5)
POTASSIUM SERPL-SCNC: 3.5 MMOL/L (ref 3.7–5.3)
RBC # BLD: 3.78 M/UL (ref 3.95–5.11)
RBC # BLD: ABNORMAL 10*6/UL
SEG NEUTROPHILS: 79 % (ref 36–65)
SEGMENTED NEUTROPHILS ABSOLUTE COUNT: 13.43 K/UL (ref 1.5–8.1)
SODIUM BLD-SCNC: 138 MMOL/L (ref 135–144)
WBC # BLD: 17 K/UL (ref 3.5–11.3)
WBC # BLD: ABNORMAL 10*3/UL

## 2020-10-26 PROCEDURE — 2580000003 HC RX 258: Performed by: NURSE PRACTITIONER

## 2020-10-26 PROCEDURE — 6370000000 HC RX 637 (ALT 250 FOR IP): Performed by: FAMILY MEDICINE

## 2020-10-26 PROCEDURE — 85025 COMPLETE CBC W/AUTO DIFF WBC: CPT

## 2020-10-26 PROCEDURE — 2060000000 HC ICU INTERMEDIATE R&B

## 2020-10-26 PROCEDURE — 99232 SBSQ HOSP IP/OBS MODERATE 35: CPT | Performed by: INTERNAL MEDICINE

## 2020-10-26 PROCEDURE — 99233 SBSQ HOSP IP/OBS HIGH 50: CPT | Performed by: INTERNAL MEDICINE

## 2020-10-26 PROCEDURE — 6370000000 HC RX 637 (ALT 250 FOR IP): Performed by: PHYSICIAN ASSISTANT

## 2020-10-26 PROCEDURE — 36415 COLL VENOUS BLD VENIPUNCTURE: CPT

## 2020-10-26 PROCEDURE — 97116 GAIT TRAINING THERAPY: CPT

## 2020-10-26 PROCEDURE — 6370000000 HC RX 637 (ALT 250 FOR IP): Performed by: INTERNAL MEDICINE

## 2020-10-26 PROCEDURE — 97110 THERAPEUTIC EXERCISES: CPT

## 2020-10-26 PROCEDURE — 80048 BASIC METABOLIC PNL TOTAL CA: CPT

## 2020-10-26 PROCEDURE — 95720 EEG PHY/QHP EA INCR W/VEEG: CPT | Performed by: PSYCHIATRY & NEUROLOGY

## 2020-10-26 PROCEDURE — 99233 SBSQ HOSP IP/OBS HIGH 50: CPT | Performed by: PSYCHIATRY & NEUROLOGY

## 2020-10-26 PROCEDURE — 94761 N-INVAS EAR/PLS OXIMETRY MLT: CPT

## 2020-10-26 PROCEDURE — 6360000002 HC RX W HCPCS: Performed by: NURSE PRACTITIONER

## 2020-10-26 RX ADMIN — LEVETIRACETAM 1500 MG: 500 TABLET, FILM COATED ORAL at 20:11

## 2020-10-26 RX ADMIN — ENOXAPARIN SODIUM 40 MG: 40 INJECTION SUBCUTANEOUS at 08:52

## 2020-10-26 RX ADMIN — AMLODIPINE BESYLATE 10 MG: 10 TABLET ORAL at 08:52

## 2020-10-26 RX ADMIN — SODIUM CHLORIDE, PRESERVATIVE FREE 10 ML: 5 INJECTION INTRAVENOUS at 09:13

## 2020-10-26 RX ADMIN — ACETAMINOPHEN 650 MG: 325 TABLET ORAL at 02:41

## 2020-10-26 RX ADMIN — ACETAMINOPHEN 650 MG: 325 TABLET ORAL at 15:46

## 2020-10-26 RX ADMIN — LEVETIRACETAM 1500 MG: 500 TABLET, FILM COATED ORAL at 08:52

## 2020-10-26 RX ADMIN — LACOSAMIDE 200 MG: 100 TABLET, FILM COATED ORAL at 08:52

## 2020-10-26 RX ADMIN — ACETAMINOPHEN 650 MG: 325 TABLET ORAL at 20:11

## 2020-10-26 RX ADMIN — ACETAMINOPHEN 650 MG: 325 TABLET ORAL at 11:31

## 2020-10-26 RX ADMIN — LACOSAMIDE 200 MG: 100 TABLET, FILM COATED ORAL at 20:11

## 2020-10-26 RX ADMIN — SODIUM CHLORIDE, PRESERVATIVE FREE 10 ML: 5 INJECTION INTRAVENOUS at 20:11

## 2020-10-26 ASSESSMENT — PAIN DESCRIPTION - PAIN TYPE: TYPE: ACUTE PAIN

## 2020-10-26 ASSESSMENT — PAIN SCALES - GENERAL
PAINLEVEL_OUTOF10: 4
PAINLEVEL_OUTOF10: 7
PAINLEVEL_OUTOF10: 5

## 2020-10-26 ASSESSMENT — ENCOUNTER SYMPTOMS: TACHYPNEA: 1

## 2020-10-26 ASSESSMENT — PAIN DESCRIPTION - LOCATION: LOCATION: HEAD

## 2020-10-26 ASSESSMENT — PAIN SCALES - WONG BAKER: WONGBAKER_NUMERICALRESPONSE: 6

## 2020-10-26 ASSESSMENT — PAIN - FUNCTIONAL ASSESSMENT: PAIN_FUNCTIONAL_ASSESSMENT: PREVENTS OR INTERFERES SOME ACTIVE ACTIVITIES AND ADLS

## 2020-10-26 NOTE — PROCEDURES
[address]  [facility] 30        CONTINUOUS VIDEO EEG MONITORING           PATIENT: Sima Leon  MRN #: [id number]  DATE: 10/26/2020 at [address] [place] [address] to 10/27/2020 at 1:30PM  REFERRING PHYSICIAN:  Rachael Ervin MD     BRIEF HISTORY: Patient is a 60year old female who was admitted for new onset seizures. Workup found possible PRES vs stroke. This is a continuous LTME since 10/22/2020 from 6:57PM.     AEDs:  Vimpat 200mg BID; Keppra 1500mg BID; Ativan PRN      EEG DESCRIPTION: 21 EEG electrodes were placed according to International 10/20 System. Single EKG electrode was also placed. Video recording was time-locked with EEG recording. BASELINE: During maximal wakefulness, the background was in delta and theta frequency of 1-6Hz ranging between 10-50uV. There was no clear posterior dominant rhythm seen in first 1 hour of the recording. Spontaneous variability and reactivity to stimulation were present. Hyperventilation and photic stimulation were not performed. Single lead EKG showed regular, heart rate at 70s per minute. Day 1 - recording started on 10/26/2020 from 7AM   AEDs:  Vimpat 200mg BID; Keppra 1500mg BID; Ativan PRN    Interictal: Periodic sharp waves in bilateral parietal/occipital, more on left. There was continuous slow in 2-6Hz, at times, intermittent rhythmic slow, generalized in theta frequency. There were lead artifacts at T4, T6 from time to time. Posterior dominant was at [facility]. Ictal: at 7:18AM, patient was out of bed and camera, no EEG seizures, likely accidental button push. Button push at 12:14PM, 2:35PM, 10:45PM, 10:53PM, accidental    Summary: During above recoding period, there was seizure risk in both posterior head regions, left > right. There was evidence of moderate to severe diffuse encephalopathy. No EEG or clinical seizures were noted.       Day 2 - 10/27/2020 through 1:30PM  AEDs:  Vimpat 200mg BID; Keppra 1500mg BID;

## 2020-10-26 NOTE — PROGRESS NOTES
NEUROLOGY INPATIENT PROGRESS NOTE    10/26/2020         Subjective: Freda Ronquillo is a  60 y.o. female admitted on 10/22/2020 with New onset seizure [facility]) [R56.9]    Briefly, this is a  60 y.o. female admitted on 10/22/2020 as a transfer from [facility].  She initially presented there with concerns of confusion after receiving vaccinations (influenza and pneumococcal). While at [facility] she reportedly had an episode of seizure, generalized clonic in nature. She was given Ativan and loaded with Keppra. Initial lab work showed marked leukocytosis and patient was given antibiotics for suspected CNS infection. Upon arrival at [facility], patient continued to be lethargic and somnolent. No nuchal rigidity was initially noted however patient did have low-grade fever requiring Tylenol. Patient underwent an lumbar puncture which did not reveal any CNS infection. MRI brain had findings suspicious for PRES. Patient was started on LTME. ID and heme-onc consult obtained. Of note, patient has a history of ovarian cancer with initial diagnosis in 2005 with multiple recurrence. Follows with Dr. Raffi Freitas as outpatient. She was recently started on Doxil and Avastin on 9/18/2020. She received 1 dose of Avastin. This was discontinued due to recent GI bleeding at [facility] for which patient underwent embolization by IR for intra-abdominal bleeding due to splenic mass with GI infiltration. She received approximately 5 units of PRBC. Per heme-onc consult note, Avastin can be associated with PRES less likely secondary to Doxil. Avastin was discontinued anyhow and plan at this time is to resume with Doxil once able as outpatient. Today, she is reporting a headache. 6 out of 10. No hx of chronic headaches or migraines. Denies any seizure activity. Improved mentation. Vision is improving    No current facility-administered medications on file prior to encounter.       Current Outpatient Medications on File Prior to Encounter   Medication Sig Dispense Refill    omeprazole (PRILOSEC) 20 MG delayed release capsule Take 20 mg by mouth daily      HYDROcodone-acetaminophen (NORCO) 5-325 MG per tablet Take 1 tablet by mouth every 6 hours as needed for Pain.  oxyCODONE (OXYCONTIN) 10 MG extended release tablet Take 10 mg by mouth every 12 hours.  prochlorperazine (COMPAZINE) 10 MG tablet Take 10 mg by mouth every 6 hours as needed      tiZANidine (ZANAFLEX) 4 MG tablet Take 4 mg by mouth every 6 hours as needed      ibuprofen (ADVIL;MOTRIN) 200 MG tablet Take 200 mg by mouth every 6 hours as needed for Pain      oxyCODONE-acetaminophen (PERCOCET)  MG per tablet Take 1-2 tablets by mouth every 4 hours.  pantoprazole (PROTONIX) 40 MG tablet Take 40 mg by mouth 2 times daily      metFORMIN (GLUCOPHAGE-XR) 500 MG extended release tablet Take 2 tablets by mouth 2 times daily 30 tablet 2    ferrous sulfate (IRON 325) 325 (65 Fe) MG tablet Take 1 tablet by mouth daily (with breakfast) (Patient not taking: Reported on 10/16/2020) 60 tablet 5    aspirin 81 MG chewable tablet Take 81 mg by mouth nightly      simvastatin (ZOCOR) 40 MG tablet Take 40 mg by mouth nightly      sertraline (ZOLOFT) 100 MG tablet Take 50 mg by mouth daily          Allergies: Adele Phalen has No Known Allergies. Past Medical History:   Diagnosis Date    Anemia     Bleeding 10/2020    intra-abdominal bleeding -due to splenic mass with GI infiltration. Status post embolization    [facility] [facility]     Depression     Diabetes mellitus ([facility] [address] [facility]     GERD (gastroesophageal reflux disease)     Metastatic cancer ([facility] [address] 10/2020    extensive intraabdominal and splenic involvement and lung mets.     Ovarian cancer ([facility] [address]     low grade serous ovarian carcinoma    Post chemo evaluation     2007: Chemo via med onc (Dr. Joe Leyva), 2008: Chemo due to rising CA-125, 2013: intraperitoneal chemo,12/2015: Ca125 - 25  Splenic lesion        Past Surgical History:   Procedure Laterality Date    ABSCESS DRAINAGE  2013    Franca rectal    ANUS SURGERY      ANAL FISSURECTOMY    COLECTOMY  03/2013    ex lap, tumor debulking, transverse colectomy w reanastamosis, subgastric omentectomy, intraperitoneal port placement    HYSTERECTOMY, TOTAL ABDOMINAL      IR EMBOLIZATION HEMORRHAGE  10/05/2020    intra-abdominal bleeding -due to splenic mass with GI infiltration. Status post embolization [employer] interlock coils x7. mri condtional 3t ok, safe immediately post implant.  IR PORT PLACEMENT EQUAL OR GREATER THAN 5 YEARS  8/24/2020    IR PORT PLACEMENT EQUAL OR GREATER THAN 5 YEARS 8/24/2020 Rajni Irizarry MD [facility] PROCEDURES    PORT SURGERY      IP Port    TONSILLECTOMY         Medications:     amLODIPine  10 mg Oral Daily    levETIRAcetam  1,500 mg Oral BID    lacosamide  200 mg Oral BID    LORazepam  2 mg Intravenous Once    enoxaparin  40 mg Subcutaneous Daily    sodium chloride flush  10 mL Intravenous 2 times per day    lidocaine PF  5 mL Intradermal Once     PRN Meds include: potassium chloride **OR** potassium alternative oral replacement **OR** potassium chloride, loperamide, labetalol, [DISCONTINUED] acetaminophen **OR** acetaminophen, LORazepam, nicotine, polyethylene glycol, promethazine **OR** ondansetron, sodium chloride flush, acetaminophen    Objective:   BP (!) 142/74   Pulse 74   Temp 98 °F (36.7 °C) (Oral)   Resp 18   Ht 5' 6\" (1.676 m)   Wt 192 lb 10.9 oz (87.4 kg)   SpO2 92%   BMI 31.10 kg/m²     Blood pressure range: Systolic (17TBX), ZNO:852 , Min:142 , GRICELDA:172   ; Diastolic (51LOR), IJP:84, Min:68, Max:81      ROS:  CONSTITUTIONAL: negative for fatigue and malaise   EYES: Positive for vision changes.  Negative for double vision and photophobia    HEENT: negative for tinnitus and sore throat   RESPIRATORY: negative for cough, shortness of breath   CARDIOVASCULAR: negative for chest pain, palpitations, or syncope   GASTROINTESTINAL: negative for abdominal pain, nausea, vomiting, diarrhea, or constipation    GENITOURINARY: negative for incontinence or retention    MUSCULOSKELETAL: negative for neck or back pain, negative for extremity pain   NEUROLOGICAL: Negative for seizures, headaches, weakness, numbness, confusion, aphasia, dysarthria   PSYCHIATRIC: negative for agitation, hallucination, SI/HI   SKIN Negative for spontaneous contusions, rashes, or lesions        NEUROLOGIC EXAMINATION  GENERAL  Appears comfortable and in no distress   HEENT  NC/ AT   HEART  S1 and S2 heard; palpation of pulses: radial pulse    NECK  Supple and no bruits heard   MENTAL STATUS:  Alert, oriented, intact memory, no confusion, normal speech, normal language, no hallucination or delusion   CRANIAL NERVES: II     -      Bitemporal hemianopsia  III,IV,VI -  PERR, EOMs full, no ptosis  V     -     Normal facial sensation   VII    -     Normal facial symmetry  VIII   -     Intact hearing   IX,X -     Symmetrical palate  XI    -     Symmetrical shoulder shrug  XII   -     Midline tongue, no atrophy    MOTOR FUNCTION: RUE: Significant for good strength of grade 5/5 in proximal and distal muscle groups   LUE: Significant for good strength of grade 5/5 in proximal and distal muscle groups   RLE: Significant for good strength of grade 5/5 in proximal and distal muscle groups   LLE: Significant for good strength of grade 5/5 in proximal and distal muscle groups      Normal bulk, normal tone and no involuntary movements, no tremor   SENSORY FUNCTION:  Normal touch, normal pin, normal vibration, normal proprioception   CEREBELLAR FUNCTION:  Intact fine motor control over upper limbs and lower limbs   REFLEX FUNCTION:  Symmetric in upper and lower extremities, no Babinski sign   STATION and GAIT  Not tested, connected with vEEG     Data:    Lab Results:   CBC:   Recent Labs     10/24/20  0844 10/25/20  0836 10/26/20  0523   WBC 17.6* 17.8* 17.0*   HGB 10.1* 9.5* 9.4*   * 887* 907*     BMP:    Recent Labs     10/24/20  0844 10/25/20  0836 10/26/20  0523    138 138   K 3.5* 3.4* 3.5*    103 104   CO2 22 23 21   BUN 6 6 4*   CREATININE 0.35* 0.31* 0.31*   GLUCOSE 124* 128* 126*         Lab Results   Component Value Date    ALT 5 10/23/2020    AST 10 10/23/2020    TSH 0.93 10/22/2020    INR 1.0 10/23/2020    KBDUBARA99 405 10/22/2020       No results found for: PHENYTOIN, PHENYTOIN, VALPROATE, CBMZ    IMAGING:  \"Care Everywhere Tab\"  CT Head [facility]) - \"Negative brain CT. \"  CTA Head and Neck with contrast [facility]) -   \"Negative CT angiogram [place] [facility]. \"  \"Essentially negative CT angiogram cervical portion of the carotid arteries. \"    XR CHEST PORTABLE   Final Result   No acute findings. MRI LIMITED BRAIN   Final Result   Limited MRI of the brain demonstrates moderate acute ischemia involving both   occipital lobes and left parietal lobe involving the cortices. Small acute punctate infarct within the posterior left frontal lobe near the   convexity. Hyperintense FLAIR signal abnormality within the cortices of both frontal   lobes and posterior left temporal lobe may represent subacute ischemia or   inflammatory process such as vasculitis. Posterior reversible   leukoencephalopathy is a possibility. IR LUMBAR PUNCTURE FOR DIAGNOSIS   Final Result   Successful fluoroscopic-guided lumbar puncture. LTME started 10/22/20 @ 18:57 reviewed through 10/26/20 @ 07:00  See full report for complete details  Following is report from Day 5  Summary: During this day of recording no events were recorded. The interictal EEG was abnormal due to diffuse polymorphic delta and theta slowing suggesting moderate to severe encephalopathy.   Near continuous left central, parietal and temporal sharp wave discharges with fast activity c/w LPDs+F+R conferred increased risk for focal onset seizures. Monitoring was continued in order to record the patient's typical events. The EKG channel revealed no abnormalities.        DEVON DUBOSE MD  Diplomate, American Board of Psychiatry and Neurology  Diplomate, American Board of Clinical Neurophysiology  Diplomate, American Board of Epilepsy     Assessment and Plan  57-year-old female with recurrent ovarian cancer initially admitted for confusion and new onset seizures. Likely PRES (posterior reversible encephalopathy syndrome), secondary to immunosuppression, with a possible adverse effect more likely due to Avastin which has been already discontinued due to GI bleed per oncology note, with a lesser possibility secondary to Doxil. Heme/onc recommendations noted and appreciated. PRES also could be secondary to recent blood pressure elevations. Was on amlodipine 5mg and currently increased to amlodipine 10 mg  Focal onset seizures secondary to PRES -continue LTME. Increase Keppra to 1.5 g twice daily and Vimpat to 200 mg twice daily. Changed to PO dosing. Seizure precautions  Leukocytosis with no identifiable source at this time. ID recommendations noted and appreciated. Will monitor off antibiotics at this time  DVT prophylaxis    Further recommendations may follow after discussing with attending physician.       Avni Ernst MD   PGY-2 Neurology  10/26/2020  8:19 AM

## 2020-10-26 NOTE — PROCEDURES
LONG-TERM EEG-VIDEO [id number] [address]    Patient: Martha Newell [facility]  Age: 60 y.o. MRN: [id number]    Referring Physician: Danitza Liriano  History: The patient is a 60 y.o. female who presented breakthrough seizure/encephalopathy. This long-term video-EEG monitoring study was performed to determine the nature of the patient's clinical events. The patient is on neuroactive medications.    Martha Garcia   Current Facility-Administered Medications   Medication Dose Route Frequency Provider Last Rate Last Dose    amLODIPine (NORVASC) tablet 10 mg  10 mg Oral Daily Gautam Velazco MD        levETIRAcetam (KEPPRA) tablet 1,500 mg  1,500 mg Oral BID Ermias Carranza MD   1,500 mg at 10/25/20 2003    lacosamide (VIMPAT) tablet 200 mg  200 mg Oral BID Ermias Carranza MD   200 mg at 10/25/20 2003    potassium chloride (KLOR-CON M) extended release tablet 40 mEq  40 mEq Oral PRN Polo CHLOE AlcalaN - CNP        Or    potassium bicarb-citric acid (EFFER-K) effervescent tablet 40 mEq  40 mEq Oral PRN Polo Fredrick, APRN - CNP        Or    potassium chloride 10 mEq/100 mL IVPB (Peripheral Line)  10 mEq Intravenous PRN Polo Fredrick APRN - CNP        loperamide (IMODIUM) capsule 2 mg  2 mg Oral 4x Daily PRN Polo Fredrick, APRN - CNP   2 mg at 10/24/20 2150    labetalol (NORMODYNE;TRANDATE) injection 5 mg  5 mg Intravenous Q6H PRN Glendy Pitt MD        LORazepam (ATIVAN) injection 2 mg  2 mg Intravenous Once Delonte DO Laurie        acetaminophen (TYLENOL) suppository 650 mg  650 mg Rectal Q6H PRN ESTRELLITA Sanchez - CNP   650 mg at 10/23/20 0145    enoxaparin (LOVENOX) injection 40 mg  40 mg Subcutaneous Daily ESTRELLITA Sanchez - CNP   40 mg at 10/25/20 0836    LORazepam (ATIVAN) injection 2 mg  2 mg Intravenous Q4H PRN ESTRELLITA Sanchez - CNP        nicotine (Jonita Ana) 21 central, parietal and temporal sharp wave discharges with fast activity conferred increased risk for focal onset seizures. Monitoring was continued in order to record the patient's typical events. The EKG channel revealed no abnormalities. Day 2 - 10 /23/20    Interictal EEG Samples: Interictal EEG was unchanged from yesterday. Ictal EEG Recording / Patient Events: During this period the patient had multiple subclinical seizures the seizures originated over the left posterior quadrant rhythmic 3 to 4 Hz of delta activity over left posterior quadrant which then evolved into higher amplitude 2-3 Hz delta activity involving left frontal and central leads. In a representative time epoch between 6 pm and 00:00, the patient had 9 seizures, lasting 15-25 seconds. Summary: During this day of recording the patient had multiple subclinical seizures originating over the left posterior quadrant with rhythmic 3-4 Hz of delta activity and then spreading across the left hemisphere. The interictal EEG was abnormal due to diffuse polymorphic delta and theta slowing suggesting moderate to severe encephalopathy. Near continuous left central, parietal and temporal sharp wave discharges with fast activity c/w LPDs+F+R conferred increased risk for focal onset seizures. Monitoring was continued in order to record the patient's typical events. The EKG channel revealed no abnormalities. Day 3 - 10/24/20    Interictal EEG Samples: Interictal EEG was unchanged from yesterday. Ictal EEG Recording / Patient Events: During this period the patient had following events    In a representative time epoch between 00:00 and 06:00 am, the patient had 10 seizures, lasting 15-25 seconds. In a representative time epoch between 06:00 and 12:00 pm, the patient had 4 seizures. In a representative time epoch between 12:00pm and 6:00 pm, the patient had 9 seizures.      In a representative time epoch between 6 pm and 12:00, the patient had 2 seizures, last seizure was 7:06 pm.    Summary: During this day of recording the patient had multiple subclinical seizures originating over the left posterior quadrant with rhythmic 3-4 Hz of delta activity and then spreading across the left hemisphere. The interictal EEG was abnormal due to diffuse polymorphic delta and theta slowing suggesting moderate to severe encephalopathy. Near continuous left central, parietal and temporal sharp wave discharges with fast activity c/w LPDs+F+R conferred increased risk for focal onset seizures. Monitoring was continued in order to record the patient's typical events. The EKG channel revealed no abnormalities. Day 4 - 10/25/20    Interictal EEG Samples: Interictal EEG was unchanged from yesterday. Ictal EEG Recording / Patient Events: During this period the patient had no events or seizures. Summary: During this day of recording no events were recorded. The interictal EEG was abnormal due to diffuse polymorphic delta and theta slowing suggesting moderate to severe encephalopathy. Near continuous left central, parietal and temporal periodic sharp wave discharges with fast activity c/w LPDs+F+R conferred increased risk for focal onset seizures. Monitoring was continued in order to record the patient's typical events. The EKG channel revealed no abnormalities. Day 5 - 10/26/20, reviewed through 7 am. Further LTM read by my colleague Dr Grimaldo Fent    Interictal EEG Samples: Interictal EEG was unchanged from yesterday. Ictal EEG Recording / Patient Events: During this period the patient had no events or seizures. Summary: During this day of recording no events were recorded. The interictal EEG was abnormal due to diffuse polymorphic delta and theta slowing suggesting moderate to severe encephalopathy.   Near continuous left central, parietal and temporal periodic sharp wave discharges with fast activity c/w LPDs+F+R conferred increased risk for focal onset seizures. Monitoring was continued in order to record the patient's typical events. The EKG channel revealed no abnormalities.     Rebekah Greenwood MD  Diplomate, American Board of Psychiatry and Neurology  Diplomate, American Board of Clinical Neurophysiology  Diplomate, American Board of Epilepsy

## 2020-10-26 NOTE — PROGRESS NOTES
[facility] - Progress Note  Today's Date and Time: 10/26/2020, 8:23 AM    Impression :   · New onset seizure  · Metabolic encephalopathy  · Hyponatremia  · Diabetes mellitus type 2  · Anemia  · Splenic lesion s/p IR embolization   · Ovarian cancer (2005) with multiple recurrences   · Metastatic ovarian cancer to abdomen, liver, lungs, spleen  · Leukocytosis   · No bacterial meningitis or viral encephalitis    Recommendations:   · Monitor off antibiotics  · Acyclovir discontinued    Medical Decision Making/Summary/Discussion:10/26/2020     · Patient with metastatic ovarian carcinoma  · Admitted post seizure  · Hyponatremic at that time. Hyponatremia since corrected  · Concern raised for infection because of leucocytosis, abnormalities in MRI. · The latter are probably vascular in nature, suspecting PRES   · CSF and clinical exam do not support bacterial meningitis or viral/parasitic encephalitis. · Will Monitor off antibiotics     Infection Control Recommendations   · [place] Precautions    Antimicrobial Stewardship Recommendations     · Discontinuation of therapy  Coordination of Outpatient Care:   · Estimated Length of IV antimicrobials:10-23-20  · Patient will need Midline Catheter Insertion: no  · Patient will need PICC line Insertion:no  · Patient will need: Home IV , [place],  SNF,  LTAC: TBD  · Patient will need outpatient wound care:No    Chief complaint/reason for consultation:   · Fever, leukocytosis     History of Present Illness:   Dmitriy Reyes is a 60y.o.-year-old  female who was initially admitted on 10/22/2020. Patient seen at the request of Dr. Shi Mackey. INITIAL HISTORY:     Patient presented to [facility] with concerns of confusion after receiving influenza and pneumonia pneumococcal vaccines. Per nursing report patient had an episode of a seizure in the emergency room at [facility].  At that time she was loaded with Keppra and received approximately 5 units of blood for marked anemia. CURRENT EVALUATION: 10/26/2020    Afebrile   Hypertensive; 140-160s/60-80s    Patient seen and examined at bedside this morning, lying comfortably in bed. No acute overnight events. Reports that she is feeling a little better every day. Notes that her incoordination is improving. Complains of a frontal headache that started at some point this weekend. She notes that it is of a constant nature and 7/10 in pain. She notes that tylenol takes the edge off but that it doesn't provide her with much relief. Denies fevers, chills, nausea, vomiting, blurry vision. Leukocytosis continues to trend down. All cultures remain negative, no suspicion for meningitis. Labs, X rays reviewed: 10/26/2020    BUN: 4-->6-->4  Cr: 0.4-->0.31-->0.31    WBC: 24.2-->22-->17.8-->17  Hb: 10-->9.8-->9.5-->9.4  Plat: 691-->741-->887-->907    Cultures:  Urine:  ·   Blood:  · 10-23-20: no growth   Sputum :  ·   Wound:  ·   CSF:  · 10-23-20:  No growth   · Molecular/PCR of CSF 10-23-20: negative     Cryptococcal Antigen, CSF negative  Urinalysis 10-24-20:  Negative          Discussed with patient, RN, daughter and son (over the phone). I have personally reviewed the past medical history, past surgical history, medications, social history, and family history, and I have updated the database accordingly. Past Medical History:     Past Medical History:   Diagnosis Date    Anemia     Bleeding 10/2020    intra-abdominal bleeding -due to splenic mass with GI infiltration. Status post embolization    [facility] [address]     Depression     Diabetes mellitus ([address]     GERD (gastroesophageal reflux disease)     Metastatic cancer ([facility] [address] 10/2020    extensive intraabdominal and splenic involvement and lung mets.     Ovarian cancer ([facility] [address]     low grade serous ovarian carcinoma    Post chemo evaluation     2007: Chemo via med onc (Dr. Rena Siegel), 2008: Chemo due to rising file     Minutes per session: Not on file    Stress: Not on file   Relationships    Social connections     Talks on phone: Not on file     Gets together: Not on file     Attends Mandaeism service: Not on file     Active member of club or organization: Not on file     Attends meetings of clubs or organizations: Not on file     Relationship status: Not on file    Intimate partner violence     Fear of current or ex partner: Not on file     Emotionally abused: Not on file     Physically abused: Not on file     Forced sexual activity: Not on file   Other Topics Concern    Not on file   Social History Narrative    Not on file       Family History:     Family History   Problem Relation Age of Onset    Alcohol Abuse Mother     Cirrhosis Mother         Allergies:   Patient has no known allergies. Review of Systems:   Constitutional: No fevers or chills. No systemic complaints  Head: No headaches  Eyes: No double vision or blurry vision. No conjunctival inflammation. ENT: No sore throat or runny nose. No hearing loss, tinnitus or vertigo. Cardiovascular: No chest pain or palpitations. No shortness of breath. No CONTRERAS  Lung: No shortness of breath or cough. No sputum production  Abdomen: No nausea, vomiting, diarrhea, or abdominal pain. No cramps. Genitourinary: No increased urinary frequency, or dysuria. No hematuria. No suprapubic or CVA pain  Musculoskeletal: No muscle aches or pains. No joint effusions, swelling or deformities  Hematologic: No bleeding or bruising. Neurologic: Frontal headache. No weakness, numbness, or tingling. Seizure x 1  Integument: No rash, no ulcers. Psychiatric: No depression. Endocrine: No polyuria, no polydipsia, no polyphagia.     Physical Examination :     Patient Vitals for the past 8 hrs:   BP Temp Temp src Pulse Resp SpO2   10/26/20 0415 (!) 142/74 98 °F (36.7 °C) Oral 74 18 92 %     General Appearance: Awake, alert, and in no apparent distress  Head:  Normocephalic, no trauma  Eyes: Pupils equal, round, reactive to light and accommodation; extraocular movements intact; sclera anicteric; conjunctivae pink. No embolic phenomena. ENT: Oropharynx clear, without erythema, exudate, or thrush. No tenderness of sinuses. Mouth/throat: mucosa pink and moist. No lesions. Dentition in good repair. Neck:Supple, without lymphadenopathy. Thyroid normal, No bruits. Pulmonary/Chest: Clear to auscultation, without wheezes, rales, or rhonchi. No dullness to percussion. Cardiovascular: Regular rate and rhythm without murmurs, rubs, or gallops. Abdomen: Soft, non tender. Bowel sounds normal. No organomegaly  All four Extremities: No cyanosis, clubbing, edema, or effusions. Neurologic: Able to move extremities, follows all commands  Skin: Warm and dry with good turgor. No signs of peripheral arterial or venous insufficiency. No ulcerations. No open wounds. Medical Decision Making -Laboratory:   I have independently reviewed/ordered the following labs:    CBC with Differential:   Recent Labs     10/25/20  0836 10/26/20  0523   WBC 17.8* 17.0*   HGB 9.5* 9.4*   HCT 33.3* 32.7*   * 907*   LYMPHOPCT 8* 10*   MONOPCT 8 8     BMP:   Recent Labs     10/25/20  0836 10/26/20  0523    138   K 3.4* 3.5*    104   CO2 23 21   BUN 6 4*   CREATININE 0.31* 0.31*     Hepatic Function Panel:   No results for input(s): PROT, LABALBU, BILIDIR, IBILI, BILITOT, ALKPHOS, ALT, AST in the last 72 hours. No results for input(s): RPR in the last 72 hours. No results for input(s): HIV in the last 72 hours. No results for input(s): BC in the last 72 hours.   Lab Results   Component Value Date    MUCUS NOT REPORTED 10/24/2020    RBC 3.78 10/26/2020    TRICHOMONAS NOT REPORTED 10/24/2020    WBC 17.0 10/26/2020    YEAST NOT REPORTED 10/24/2020    TURBIDITY TURBID 10/24/2020     Lab Results   Component Value Date    CREATININE 0.31 10/26/2020    GLUCOSE 126 10/26/2020       Medical Decision Making-Imaging:     MRI OF THE BRAIN WITHOUT CONTRAST  10/22/2020 1:06 pm         TECHNIQUE:    Multiplanar multisequence MRI of the brain was performed without the    administration of intravenous contrast.         COMPARISON:    None.         HISTORY:    ORDERING SYSTEM PROVIDED HISTORY: new onset seizures, ovarian CA with diffuse    mets    TECHNOLOGIST PROVIDED HISTORY:    Pt given multiple doses of meds. Keeps moving. Best study possible at this    time. new onset seizures, ovarian CA with diffuse mets         FINDINGS:    INTRACRANIAL STRUCTURES/VENTRICLES: There is restricted diffusion identified    within the cortex of both occipital lobes and left parietal lobe compatible    with acute ischemia.  Small additional punctate infarct is identified within    the left frontal lobe near the convexity.         High FLAIR signal abnormality is identified within the cortices of both    frontal lobes and posterior left temporal lobe which could represent subacute    ischemia.  Mild chronic microvascular disease is noted within the    periventricular white matter.         No mass effect or midline shift. No evidence of an acute intracranial    hemorrhage.  The ventricles and sulci are normal in size and configuration.     The sellar/suprasellar regions appear unremarkable.  The normal signal voids    within the major intracranial vessels appear maintained.         ORBITS: The visualized portion of the orbits demonstrate no acute abnormality.         SINUSES: The visualized paranasal sinuses and mastoid air cells are well    aerated.         BONES/SOFT TISSUES: The bone marrow signal intensity appears normal. The soft    tissues demonstrate no acute abnormality.              Impression    Limited MRI of the brain demonstrates moderate acute ischemia involving both    occipital lobes and left parietal lobe involving the cortices.         Small acute punctate infarct within the posterior left frontal lobe near the convexity.         Hyperintense FLAIR signal abnormality within the cortices of both frontal    lobes and posterior left temporal lobe may represent subacute ischemia or    inflammatory process such as vasculitis.  Posterior reversible    leukoencephalopathy is a possibility. Medical Decision [id number]:     Results for Erika Banegas (MRN [id number]) as of 10/23/2020 21:07   Ref. Range 10/22/2020 16:05   Albumin Index Latest Ref Range: <9.0  39.1 (H)   Albumin, CSF Latest Ref Range: 70 - 350 mg/L 129   Appearance, CSF Unknown CLEAR   Glucose, CSF Latest Ref Range: 40 - 70 mg/dL 78 (H)   IgG Index, CSF Latest Ref Range: <0.70  0.59   IgG Synthesis Rate, CSF Latest Ref Range: <3.3 mg/24 h < 3.3   IgG, CSF Latest Ref Range: 0.5 - 7.0 mg/dL 1.7   Oligo Bands Unknown Oligoclonal bands are performed at [facility] using isoelectric focusing and immunofixation. OLIGOCLONAL BANDING Unknown Rpt (A)   Protein, CSF Latest Ref Range: 15.0 - 45.0 mg/dL 25.4   Supernatant Color, CSF Unknown NOT REPORTED   Volume, CSF Unknown 5   RBC, CSF Latest Ref Range: 0 /mm3 0   WBC, CSF Latest Ref Range: <5 /mm3 0   Tube Number, CSF Unknown 3     Results for Erika Banegas (MRN [id number]) as of 10/23/2020 21:07   Ref. Range 10/5/2020 10:46    Latest Ref Range: <38 U/mL 223 (H)     Results for Erika Banegas (MRN [id number]) as of 10/23/2020 21:07   Ref.  Range 10/23/2020 01:26   HAEMOPHILUS INFLUENZA CSF FILM ARRAY Latest Ref Range: Not Detected  Not Detected   HHV-6 (HERPESVIRUS 6) CSF FILM ARRAY Latest Ref Range: Not Detected  Not Detected   HSV-1 CSF FILM ARRAY Latest Ref Range: Not Detected  Not Detected   HSV-2 CSF FILM ARRAY Latest Ref Range: Not Detected  Not Detected   PARECHOVIRUS CSF FILM ARRAY Latest Ref Range: Not Detected  Not Detected   ESCHERICHIA COLI K1 CSF FILM ARRAY Latest Ref Range: Not Detected  Not Detected   LISTERIA MONOCYTOGENES CSF FILM ARRAY Latest Ref Range: Not Detected Not Detected   NEISSERIA MENIGITIDIS CSF FILM ARRAY Latest Ref Range: Not Detected  Not Detected   STREPTOCOCCUS AGALACTIAE CSF FILM ARRAY Latest Ref Range: Not Detected  Not Detected   STREPTOCOCCUS PNEUMONIAE CSF FILM ARRAY Latest Ref Range: Not Detected  Not Detected   CRYPTOCOCCUS NEOFORMANS/CARMEN CSF FILM ARR. Latest Ref Range: Not Detected  Not Detected       Medical Decision Making-Other:     Note:  · Labs, medications, radiologic studies were reviewed with personal review of films  · Large amounts of data were reviewed  · Discussed with nursing Staff, Discharge planner  · Infection Control and Prevention measures reviewed  · All prior entries were reviewed  · Administer medications as ordered  · Prognosis: Fair  · Discharge planning reviewed  · Follow up as outpatient. Thank you for allowing us to participate in the care of this patient. Please call with questions. [address]:    I have discussed the case, including pertinent history and exam findings with the residents and students. I have seen and examined the patient and the key elements of the encounter have been performed by me. I was present when the student obtained his information or examined the patient. I have reviewed the laboratory data, other diagnostic studies and discussed them with the residents. I have updated the medical record where necessary. I agree with the assessment, plan and orders as documented by the resident/ student.     Alberto Steel MD.      Pager: [phone] - Office: [phone]

## 2020-10-26 NOTE — PROGRESS NOTES
[facility]  Office: [address], DO, John León, DO, France Lopez, DO, Yobany Linette Blood, DO, Alee Soto MD, Yojana Leslie MD, Alena Bustos MD, Gualberto Mathis MD, Joni Menjivar MD, Rahel Lopez MD, Lucina Bliss MD, Ela Mcguire MD, Mena Desai MD, Charmaine Al, DO, Jerardo Horne MD, Christie Ellis MD, Jean-Paul Kennedy DO, Seema Lin MD,  Kaley Cross DO, Jt Richard MD, Radha Khan MD, Fletcher Rahman, CNP, [facility] Mccullough, CNP, Jaspal Howell, CNP, Franci Mask, CNS, Frieda Dassel, CNP, Katherine Llamas, CNP, Yamilet Luther, CNP, Pascual Severino, CNP, Alan Bryant, CNP, Lendel Nissen, PA-C, Migue Braga, DNP, Gato Estevez, CNP, Uma Lerma, CNP, Toma Jaramillo, CNP, Donna Marlow, CNP, Sebastián Small, CNP         [facility]   [address]    Progress Note    10/26/2020    9:51 AM    Name:   Marget Ormond  MRN:     [id number]     Acct:      [de-identified]   Room:   5275/9897-00   Day:  4  Admit Date:  10/22/2020 12:20 AM    PCP:   No primary care provider on file. Code Status:  Full Code    Subjective:     C/C: AMS     Interval History Status: improved. No issues overnight  Mentation stable  Remains on LTME  No other complaints     Brief History:     60 F presented with seizure episode. Patient currently lethargic, not cooperating with questions. Answering intermittently, disoriented to place. On chart review initially presented to [facility] with confusion. Noted to have seizure episode, no prior history of seizures. Started on antibiotics, transferred to [facility] for further neurological workup. MRI concerning for PRES syndrome. LP negative. Neurology consulted, started on LTME, demonstrating seizure activity. AEDs adjusted by neurology. Mentation improved.      Review of Systems:     Constitutional:  negative for chills, fevers, sweats  Respiratory:  negative for cough, dyspnea on exertion, shortness of breath  Cardiovascular:  negative for chest pain, chest pressure/discomfort  Gastrointestinal:  negative for abdominal pain, constipation, diarrhea, nausea, vomiting  Neurological:  negative for dizziness, headache    Medications: Allergies:  No Known Allergies    Current Meds:   Scheduled Meds:    amLODIPine  10 mg Oral Daily    levETIRAcetam  1,500 mg Oral BID    lacosamide  200 mg Oral BID    LORazepam  2 mg Intravenous Once    enoxaparin  40 mg Subcutaneous Daily    sodium chloride flush  10 mL Intravenous 2 times per day    lidocaine PF  5 mL Intradermal Once     Continuous Infusions:     PRN Meds: potassium chloride **OR** potassium alternative oral replacement **OR** potassium chloride, loperamide, labetalol, [DISCONTINUED] acetaminophen **OR** acetaminophen, LORazepam, nicotine, polyethylene glycol, promethazine **OR** ondansetron, sodium chloride flush, acetaminophen    Data:     Past Medical History:   has a past medical history of Anemia, Bleeding, Cervical cancer [address] [address] Depression, Diabetes mellitus [facility] GERD (gastroesophageal reflux disease), Metastatic cancer ([facility] Ovarian cancer ([facility] Post chemo evaluation, and Splenic lesion. Social History:   reports that she has been smoking. She has been smoking about 1.00 pack per day. She uses smokeless tobacco. She reports previous alcohol use. She reports that she does not use drugs. Family History:   Family History   Problem Relation Age of Onset    Alcohol Abuse Mother     Cirrhosis Mother        Vitals:  BP (!) 147/71   Pulse 88   Temp 96.8 °F (36 °C) (Oral)   Resp 25   Ht 5' 6\" (1.676 m)   Wt 192 lb 10.9 oz (87.4 kg)   SpO2 96%   BMI 31.10 kg/m²   Temp (24hrs), Av.5 °F (36.4 °C), Min:96.8 °F (36 °C), Max:98 °F (36.7 °C)    No results for input(s): POCGLU in the last 72 hours. I/O (24Hr):   No intake or output data in the 24 hours ending 10/26/20 0277    Labs:  Hematology:  Recent Labs     10/24/20  5720 10/25/20  0836 10/26/20  0523   WBC 17.6* 17.8* 17.0*   RBC 4.04 3.78* 3.78*   HGB 10.1* 9.5* 9.4*   HCT 35.6* 33.3* 32.7*   MCV 88.1 88.1 86.5   MCH 25.0* 25.1* 24.9*   MCHC 28.4 28.5 28.7   RDW 21.2* 21.0* 21.0*   * 887* 907*   MPV 9.3 8.9 8.7     Chemistry:  Recent Labs     10/24/20  0844 10/25/20  0836 10/26/20  0523    138 138   K 3.5* 3.4* 3.5*    103 104   CO2 22 23 21   GLUCOSE 124* 128* 126*   BUN 6 6 4*   CREATININE 0.35* 0.31* 0.31*   ANIONGAP 14 12 13   LABGLOM >60 >60 >60   GFRAA >60 >60 >60   CALCIUM 9.2 8.4* 8.9     No results for input(s): PROT, LABALBU, LABA1C, Z5IZRRQ, J9SQZMR, FT4, TSH, AST, ALT, LDH, GGT, ALKPHOS, LABGGT, BILITOT, BILIDIR, AMMONIA, AMYLASE, LIPASE, LACTATE, CHOL, HDL, LDLCHOLESTEROL, CHOLHDLRATIO, TRIG, VLDL, GJK88DA, PHENYTOIN, PHENYF, URICACID, POCGLU in the last 72 hours. ABG:No results found for: POCPH, PHART, PH, POCPCO2, HNS5JCH, PCO2, POCPO2, PO2ART, PO2, POCHCO3, CVA3DWS, HCO3, NBEA, PBEA, BEART, BE, THGBART, THB, YCR1UIM, XTBX4LOD, J4ZTPAPB, O2SAT, FIO2  Lab Results   Component Value Date/Time    SPECIAL RIGHT HAND AMOUNT UNKNOWN 10/23/2020 01:08 AM    SPECIAL LT HAND AMOUNT UNKNOWN 10/23/2020 01:08 AM     Lab Results   Component Value Date/Time    CULTURE NO GROWTH 3 DAYS 10/23/2020 01:08 AM    CULTURE NO GROWTH 3 DAYS 10/23/2020 01:08 AM       Radiology:  Ir Lumbar Puncture For Diagnosis    Result Date: 10/22/2020  Successful fluoroscopic-guided lumbar puncture. Mri Limited Brain    Result Date: 10/23/2020  Limited MRI of the brain demonstrates moderate acute ischemia involving both occipital lobes and left parietal lobe involving the cortices Small acute punctate infarct within the posterior left frontal lobe near the convexity Hyperintense FLAIR signal mildly within the cortices of both frontal lobes and posterior left temporal lobe may represent subacute ischemia or inflammatory process such as vasculitis.   Posterior reversible leukoencephalopathy is a possibility       Physical Examination:        General appearance:  alert, cooperative and no distress  Mental Status:  oriented to person, intermittently confused   Lungs:  clear to auscultation bilaterally, normal effort  Heart:  regular rate and rhythm  Abdomen:  soft, nontender, nondistended, normal bowel sounds  Extremities:  no edema, redness, tenderness in the calves  Skin:  no gross lesions, rashes, induration    Assessment:        Hospital Problems           Last Modified POA    * (Principal) New onset seizure ([address] 10/22/2020 Yes    Malignant neoplasm of ovary ([address] 10/22/2020 Yes    Type 2 diabetes mellitus without complication, without long-term current use of insulin ([address] 10/22/2020 Yes    Anemia 10/22/2020 Yes    Splenic lesion 10/22/2020 Yes    Ovarian cancer ([address] 10/22/2020 Yes    Overview Signed 10/22/2020  1:05 AM by ESTRELLITA Juan - CNP     low grade serous ovarian carcinoma         Acute encephalopathy 10/22/2020 Yes    PRES (posterior reversible encephalopathy syndrome) 10/23/2020 Yes    Hemianopia, bitemporal 10/23/2020 Yes          Plan:        - Vitals, labs, imaging, medications reviewed  - Neurology consulted - continue LTME, added vimpat  - Check MRI - demonstrating CVA - discussed with Dr Brinton Landau - more concerning for PRES  - LTME per neurology  - LP per IR - negative   - Seizure precautions, Neuro checks  - Monitor glucose, continue insulin correction   - Continue current BP medications   - Further AED adjustments, LTME per neurology       Loulou Chau MD  10/26/2020  9:51 AM

## 2020-10-26 NOTE — PROGRESS NOTES
Today's Date: 10/26/2020  Patient Name: Sima Leon  Date of admission: 10/22/2020 12:20 AM  Patient's age: 60 y.o., 1965  Admission Dx: New onset seizure ([address] [R56.9]    Reason for Consult: ovarian cancer   Requesting Physician: Ciro Pallas, MD    CHIEF COMPLAINT:  No chief complaint on file. SUBJECTIVE:    Patient seen and examined  NO headache   daughter at bedside  No NV  Feels slightly better      HISTORY OF PRESENT ILLNESS:    Sima Leon is a 60 y.o.  female who is admitted to the hospital for altered mental status when she presented to [facility] and then subsequently transferred to [facility] for further care. She had limited MRI which showed possible acute stroke versus GA ES and has been evaluated by neurology. Patient has been receiving chemotherapy for recurrent ovarian cancer with Dr. Gerhardt Bookbinder as outpatient. She recently was started on Doxil and Avastin on 9/18. She received only 1 dose of Avastin. Brief oncologic history:  Patient follows with Dr. Gerhardt Bookbinder as outpatient. DIAGNOSIS:       Recurrent ovarian cancer. Original diagnosis 2005 with multiple recurrences. Diffuse metastasis. CURRENT THERAPY:         Doxil/ Avastin started 9/18/2020. Avastin will be discontinued due to recent GI bleeding. Status post recent vascular embolization    Past Medical History:   has a past medical history of Anemia, Bleeding, Cervical cancer ([address] Depression, Diabetes mellitus ([address] GERD (gastroesophageal reflux disease), Metastatic cancer ([address] Ovarian cancer ([address] Post chemo evaluation, and Splenic lesion. Past Surgical History:   has a past surgical history that includes Hysterectomy, total abdominal; Port Surgery; Tonsillectomy; IR PORT PLACEMENT > 5 YEARS (8/24/2020); Anus surgery; Abscess Drainage (2013); colectomy (03/2013); and IR EMBOLIZATION HEMORRHAGE (10/05/2020).      Medications:    Prior to Admission medications    Medication Sig Start Date End Date Taking? Authorizing Provider   omeprazole (PRILOSEC) 20 MG delayed release capsule Take 20 mg by mouth daily   Yes Historical Provider, MD   HYDROcodone-acetaminophen (NORCO) 5-325 MG per tablet Take 1 tablet by mouth every 6 hours as needed for Pain. Yes Historical Provider, MD   oxyCODONE (OXYCONTIN) 10 MG extended release tablet Take 10 mg by mouth every 12 hours. Yes Historical Provider, MD   prochlorperazine (COMPAZINE) 10 MG tablet Take 10 mg by mouth every 6 hours as needed   Yes Historical Provider, MD   tiZANidine (ZANAFLEX) 4 MG tablet Take 4 mg by mouth every 6 hours as needed   Yes Historical Provider, MD   ibuprofen (ADVIL;MOTRIN) 200 MG tablet Take 200 mg by mouth every 6 hours as needed for Pain   Yes Historical Provider, MD   oxyCODONE-acetaminophen (PERCOCET)  MG per tablet Take 1-2 tablets by mouth every 4 hours.  10/12/20   Historical Provider, MD   pantoprazole (PROTONIX) 40 MG tablet Take 40 mg by mouth 2 times daily    Historical Provider, MD   metFORMIN (GLUCOPHAGE-XR) 500 MG extended release tablet Take 2 tablets by mouth 2 times daily 10/5/20   Grayson Zavala MD   ferrous sulfate (IRON 325) 325 (65 Fe) MG tablet Take 1 tablet by mouth daily (with breakfast)  Patient not taking: Reported on 10/16/2020 9/18/20   Grayson Zavala MD   aspirin 81 MG chewable tablet Take 81 mg by mouth nightly    Historical Provider, MD   simvastatin (ZOCOR) 40 MG tablet Take 40 mg by mouth nightly    Historical Provider, MD   sertraline (ZOLOFT) 100 MG tablet Take 50 mg by mouth daily  8/8/20   Historical Provider, MD     Current Facility-Administered Medications   Medication Dose Route Frequency Provider Last Rate Last Dose    amLODIPine (NORVASC) tablet 10 mg  10 mg Oral Daily Saima Menjivar MD   10 mg at 10/26/20 0852    levETIRAcetam (KEPPRA) tablet 1,500 mg  1,500 mg Oral BID Beto Montiel MD   1,500 mg at 10/26/20 0852    lacosamide (VIMPAT) tablet 200 mg  200 mg Oral BID Gema David Douglas MD   200 mg at 10/26/20 3323    potassium chloride (KLOR-CON M) extended release tablet 40 mEq  40 mEq Oral PRN Shayy Plush, APRN - CNP        Or    potassium bicarb-citric acid (EFFER-K) effervescent tablet 40 mEq  40 mEq Oral PRN [place] Plush, APRN - CNP        Or    potassium chloride 10 mEq/100 mL IVPB (Peripheral Line)  10 mEq Intravenous PRN [place] Plush, APRN - CNP        loperamide (IMODIUM) capsule 2 mg  2 mg Oral 4x Daily PRN [place] Plush, APRN - CNP   2 mg at 10/24/20 2150    labetalol (NORMODYNE;TRANDATE) injection 5 mg  5 mg Intravenous Q6H PRN Karin Plasencia MD        LORazepam (ATIVAN) injection 2 mg  2 mg Intravenous Once Delonte Sullivan DO        acetaminophen (TYLENOL) suppository 650 mg  650 mg Rectal Q6H PRN Dorcas Aurora, APRN - CNP   650 mg at 10/23/20 0145    enoxaparin (LOVENOX) injection 40 mg  40 mg Subcutaneous Daily Dorcas Amador, APRN - CNP   40 mg at 10/26/20 1577    LORazepam (ATIVAN) injection 2 mg  2 mg Intravenous Q4H PRN Dorcas Amador, APRN - CNP        nicotine (NICODERM CQ) 21 MG/24HR 1 patch  1 patch Transdermal Daily PRN Dorcas Aurora, APRN - CNP        polyethylene glycol (GLYCOLAX) packet 17 g  17 g Oral Daily PRN Dorcas Amador, APRN - CNP        promethazine (PHENERGAN) tablet 12.5 mg  12.5 mg Oral Q6H PRN Dorcas Aurora, APRN - CNP        Or    ondansetron [facility]) injection 4 mg  4 mg Intravenous Q6H PRN Dorcas Amador, APRN - CNP        sodium chloride flush 0.9 % injection 10 mL  10 mL Intravenous 2 times per day Dorcas Aurora, APRN - CNP   10 mL at 10/26/20 0913    sodium chloride flush 0.9 % injection 10 mL  10 mL Intravenous PRN Dorcas Aurora, APRN - CNP        lidocaine PF 2 % injection 5 mL  5 mL Intradermal Once Karin Plasencia MD        acetaminophen (TYLENOL) tablet 650 mg  650 mg Oral Q4H PRN JOANN Arevalo   650 mg at 10/26/20 7025       Allergies:  Patient has no known allergies.     Social History:   reports that she has been smoking. She has been smoking about 1.00 pack per day. She uses smokeless tobacco. She reports previous alcohol use. She reports that she does not use drugs. Family History: family history includes Alcohol Abuse in her mother; Cirrhosis in her mother. REVIEW OF SYSTEMS:    Constitutional: No fever or chills. No night sweats, no weight loss   Eyes: No eye discharge, double vision, or eye pain   HEENT: negative for sore mouth, sore throat, hoarseness and voice change   Respiratory: negative for cough , sputum, dyspnea, wheezing, hemoptysis, chest pain   Cardiovascular: negative for chest pain, dyspnea, palpitations, orthopnea, PND   Gastrointestinal: negative for nausea, vomiting, diarrhea, constipation, abdominal pain, Dysphagia, hematemesis and hematochezia   Genitourinary: negative for frequency, dysuria, nocturia, urinary incontinence, and hematuria   Integument: negative for rash, skin lesions, bruises.    Hematologic/Lymphatic: negative for easy bruising, bleeding, lymphadenopathy, or petechiae   Endocrine: negative for heat or cold intolerance,weight changes, change in bowel habits and hair loss   Musculoskeletal: negative for myalgias, arthralgias, pain, joint swelling,and bone pain   Neurological: negative for headaches, dizziness, seizures, weakness, numbness    PHYSICAL EXAM:      BP (!) 140/73   Pulse 95   Temp 97.8 °F (36.6 °C) (Axillary)   Resp 25   Ht 5' 6\" (1.676 m)   Wt 192 lb 10.9 oz (87.4 kg)   SpO2 97%   BMI 31.10 kg/m²    Temp (24hrs), Av.6 °F (36.4 °C), Min:96.8 °F (36 °C), Max:98 °F (36.7 °C)    General appearance - well appearing, no in pain or distress   Eyes - pupils equal and reactive, extraocular eye movements intact   Ears - bilateral TM's and external ear canals normal   Mouth - mucous membranes moist, pharynx normal without lesions   Neck - supple, no significant adenopathy   Lymphatics - no palpable lymphadenopathy, no hepatosplenomegaly   Chest - clear to auscultation, no wheezes, rales or rhonchi, symmetric air entry   Heart - normal rate, regular rhythm, normal S1, S2, no murmurs  Abdomen - soft, nontender, nondistended, no masses or organomegaly   Neurological -awake slightly confused,, normal speech, no focal findings or movement disorder noted   Musculoskeletal - no joint tenderness, deformity or swelling   Extremities - peripheral pulses normal, no pedal edema, no clubbing or cyanosis   Skin - normal coloration and turgor, no rashes, no suspicious skin lesions noted ,    DATA:    Labs:   CBC:   Recent Labs     10/25/20  0836 10/26/20  0523   WBC 17.8* 17.0*   HGB 9.5* 9.4*   HCT 33.3* 32.7*   * 907*     BMP:   Recent Labs     10/25/20  0836 10/26/20  0523    138   K 3.4* 3.5*   CO2 23 21   BUN 6 4*   CREATININE 0.31* 0.31*   LABGLOM >60 >60   GLUCOSE 128* 126*     PT/INR:   No results for input(s): PROTIME, INR in the last 72 hours. IMAGING DATA:  @IMG@   XR CHEST PORTABLE   Final Result   No acute findings. MRI LIMITED BRAIN   Final Result   Limited MRI of the brain demonstrates moderate acute ischemia involving both   occipital lobes and left parietal lobe involving the cortices. Small acute punctate infarct within the posterior left frontal lobe near the   convexity. Hyperintense FLAIR signal abnormality within the cortices of both frontal   lobes and posterior left temporal lobe may represent subacute ischemia or   inflammatory process such as vasculitis. Posterior reversible   leukoencephalopathy is a possibility.          IR LUMBAR PUNCTURE FOR DIAGNOSIS   Final Result   Successful fluoroscopic-guided lumbar puncture.              Mri Limited Brain     Result Date: 10/23/2020  Limited MRI of the brain demonstrates moderate acute ischemia involving both occipital lobes and left parietal lobe involving the cortices Small acute punctate infarct within the posterior left frontal lobe near the convexity Hyperintense FLAIR signal mildly within the cortices of both frontal lobes and posterior left temporal lobe may represent subacute ischemia or inflammatory process such as vasculitis. Posterior reversible leukoencephalopathy is a possibility      Primary Problem  New onset seizure [facility])    Active Hospital Problems    Diagnosis Date Noted    PRES (posterior reversible encephalopathy syndrome) [I67.83]     Hemianopia, bitemporal [H53.47]     Type 2 diabetes mellitus without complication, without long-term current use of insulin ([address] [E11.9]     Anemia [D64.9]     Splenic lesion [D73.89]     Ovarian cancer ([facility] [C56.9]     Acute encephalopathy [G93.40]     New onset seizure ([facility] [R56.9] 10/21/2020    Malignant neoplasm of ovary ([facility] [C56.9] 08/17/2020     IMPRESSION:   1. AMS  2. Posterior reversible encephalopathy syndrome (PRES);   3. Recurrent ovarian cancer started on chemotherapy with single agent Doxil and Avastin on 9/18/2020. Patient has received only 1 cycle. Because of the recent GI bleed and vascular embolization Avastin was discontinued and second cycle of chemotherapy was scheduled on 10/23  4. Leukocytosis  5. Anemia thrombocytosis  6. RECOMMENDATIONS:  1. I reviewed the laboratory data, diagnosis, prognosis and treatment options  2. I discussed with patient and family  3. Avastin can be associated with press syndrome. This is less likely secondary to Doxil. Regardless there was a plan to hold off Avastin down the road  4. Once acute issues resolve she will resume therapy with Doxil as outpatient  5. She would need repeat MRI brain for follow-up in few weeks  6. No acute oncologic interventions indicated at this point  7. Patient will follow up with Dr. Franklyn Barnard as outpatient after discharge  8. I had discussion with patient's family who were present with a video conference in patient's room      Discussed with patient and Nurse.       Thank you for asking us to see this patient. Aryan Lockwood MD  Hematologist/Medical Oncologist    Cell: [phone]      This note is created with the assistance of a speech recognition program.  While intending to generate a document that actually reflects the content of the visit, the document can still have some errors including those of syntax and sound a like substitutions which may escape proof reading. It such instances, actual meaning can be extrapolated by contextual diversion.

## 2020-10-26 NOTE — PROGRESS NOTES
Physical Therapy  Facility/Department: [facility] NEURO  Daily Treatment Note  NAME: Sly Fish  : 1965  MRN: [id number]    Date of Service: 10/26/2020    Discharge Recommendations:  Patient would benefit from continued therapy after discharge   PT Equipment Recommendations  Equipment Needed: No    Assessment   Body structures, Functions, Activity limitations: Decreased functional mobility ; Decreased ROM; Decreased strength;Decreased safe awareness;Decreased endurance;Decreased coordination;Decreased cognition  Assessment: At this time pt is high fall risk and woudl benefit from aggressive PT upon d/c. WIll continue to assess  Prognosis: Good  REQUIRES PT FOLLOW UP: Yes  Activity Tolerance  Activity Tolerance: Patient Tolerated treatment well     Patient Diagnosis(es): There were no encounter diagnoses. has a past medical history of Anemia, Bleeding, Cervical cancer ([facility] [address] Depression, Diabetes mellitus ([facility] [address] GERD (gastroesophageal reflux disease), Metastatic cancer ([facility] [address] Ovarian cancer ([facility] [address] Post chemo evaluation, and Splenic lesion. has a past surgical history that includes Hysterectomy, total abdominal; Port Surgery; Tonsillectomy; IR PORT PLACEMENT > 5 YEARS (2020); Anus surgery; Abscess Drainage (); colectomy (2013); and IR EMBOLIZATION HEMORRHAGE (10/05/2020). Restrictions  Restrictions/Precautions  Restrictions/Precautions: General Precautions, Fall Risk, Seizure  Required Braces or Orthoses?: No  Subjective   General  Response To Previous Treatment: Patient with no complaints from previous session. Family / Caregiver Present: Yes  Subjective  Subjective: Pt agreeable to PT.  Reports headache, slow to respond  Pain Screening  Patient Currently in Pain: Yes  Pain Assessment  Pain Assessment: Faces  Bautista-Baker Pain Rating: Hurts even more  Pain Type: Acute pain  Pain Location: Head  Functional Pain Assessment: Prevents or interferes some active activities and ADLs  Response to Pain Intervention: Patient Satisfied  Vital Signs  Patient Currently in Pain: Yes       Orientation  Orientation  Overall Orientation Status: Within Functional Limits  Cognition      Objective   Bed mobility  Rolling to Right: Contact guard assistance  Supine to Sit: Contact guard assistance  Sit to Supine: Contact guard assistance  Scooting: Contact guard assistance  Comment: somewhat impulsive  Transfers  Sit to Stand: Contact guard assistance(x 3 trials, from bed and toilet)  Stand to sit: Contact guard assistance  Ambulation  Ambulation?: Yes  Ambulation 1  Surface: level tile  Device: No Device  Assistance: Contact guard assistance;Minimal assistance  Quality of Gait: decreased safety awreness, lateral sway, scissoring, running into obstacles  Distance: 50 ft within rm  Comments: limited by LTME     Balance  Posture: Fair  Sitting - Static: Good  Sitting - Dynamic: Good  Standing - Static: Fair;+  Standing - Dynamic: Fair  Comments: unsupported    Exercise  Standing heel/toe raise, marching in place, mini squats x 10  Further ex deferred due to increased headache     Goals  Short term goals  Time Frame for Short term goals: 10 days  Short term goal 1: Pt to ambulate 150ft without AD and no LOB. Short term goal 2: Pt to tolerate 20-30 mins ther ex/act for improved strength and endurance with ADL's. Short term goal 3: Pt to demonstrate good dynamic standing balance for decrease fall risk. Short term goal 4: Pt to perform all bed mob, transfers, and gait independently. Patient Goals   Patient goals : get better    Plan    Plan  Times per week: 5-6 x week  Times per day: Daily  Current Treatment Recommendations: Strengthening, Neuromuscular Re-education, Home Exercise Program, Safety Education & Training, Balance Training, Endurance Training, Functional Mobility Training, Transfer Training, Gait Training, Stair training  Safety Devices  Type of devices:  All fall risk precautions in place, Bed alarm in place, Left in bed  Restraints  Initially in place: No     Therapy Time   Individual Concurrent Group Co-treatment   Time In 1110         Time Out 1135         Minutes 25         Timed Code Treatment Minutes: [address] [facility], [facility]

## 2020-10-26 NOTE — PLAN OF CARE
Problem: Skin Integrity:  Goal: Absence of new skin breakdown  Description: Absence of new skin breakdown  Outcome: Met This Shift     Problem: Falls - Risk of:  Goal: Will remain free from falls  Description: Will remain free from falls  Outcome: Met This Shift  Goal: Absence of physical injury  Description: Absence of physical injury  Outcome: Met This Shift     Problem: Skin Integrity:  Goal: Will show no infection signs and symptoms  Description: Will show no infection signs and symptoms  Outcome: Ongoing     Problem: Health Behavior:  Goal: Ability to manage health-related needs will improve  Description: Ability to manage health-related needs will improve  Outcome: Ongoing

## 2020-10-27 LAB
-: NORMAL
ABSOLUTE EOS #: 0.35 K/UL (ref 0–0.44)
ABSOLUTE IMMATURE GRANULOCYTE: 0.17 K/UL (ref 0–0.3)
ABSOLUTE LYMPH #: 1.39 K/UL (ref 1.1–3.7)
ABSOLUTE MONO #: 1.39 K/UL (ref 0.1–1.2)
ANION GAP SERPL CALCULATED.3IONS-SCNC: 11 MMOL/L (ref 9–17)
BASOPHILS # BLD: 1 % (ref 0–2)
BASOPHILS ABSOLUTE: 0.17 K/UL (ref 0–0.2)
BUN BLDV-MCNC: 6 MG/DL (ref 6–20)
BUN/CREAT BLD: ABNORMAL (ref 9–20)
CALCIUM SERPL-MCNC: 9.1 MG/DL (ref 8.6–10.4)
CHLORIDE BLD-SCNC: 104 MMOL/L (ref 98–107)
CO2: 23 MMOL/L (ref 20–31)
CREAT SERPL-MCNC: 0.34 MG/DL (ref 0.5–0.9)
DIFFERENTIAL TYPE: ABNORMAL
EOSINOPHILS RELATIVE PERCENT: 2 % (ref 1–4)
GFR AFRICAN AMERICAN: >60 ML/MIN
GFR NON-AFRICAN AMERICAN: >60 ML/MIN
GFR SERPL CREATININE-BSD FRML MDRD: ABNORMAL ML/MIN/{1.73_M2}
GFR SERPL CREATININE-BSD FRML MDRD: ABNORMAL ML/MIN/{1.73_M2}
GLUCOSE BLD-MCNC: 120 MG/DL (ref 65–105)
GLUCOSE BLD-MCNC: 121 MG/DL (ref 70–99)
GLUCOSE BLD-MCNC: 151 MG/DL (ref 65–105)
GLUCOSE BLD-MCNC: 85 MG/DL (ref 65–105)
HCT VFR BLD CALC: 31.5 % (ref 36.3–47.1)
HEMOGLOBIN: 9.6 G/DL (ref 11.9–15.1)
IMMATURE GRANULOCYTES: 1 %
LYMPHOCYTES # BLD: 8 % (ref 24–43)
MCH RBC QN AUTO: 25.3 PG (ref 25.2–33.5)
MCHC RBC AUTO-ENTMCNC: 30.5 G/DL (ref 28.4–34.8)
MCV RBC AUTO: 82.9 FL (ref 82.6–102.9)
MONOCYTES # BLD: 8 % (ref 3–12)
MORPHOLOGY: ABNORMAL
NRBC AUTOMATED: 0 PER 100 WBC
PDW BLD-RTO: 21.2 % (ref 11.8–14.4)
PLATELET # BLD: ABNORMAL K/UL (ref 138–453)
PLATELET ESTIMATE: ABNORMAL
PLATELET, FLUORESCENCE: 1080 K/UL (ref 138–453)
PLATELET, IMMATURE FRACTION: 1.3 % (ref 1.1–10.3)
PMV BLD AUTO: ABNORMAL FL (ref 8.1–13.5)
POTASSIUM SERPL-SCNC: 4.2 MMOL/L (ref 3.7–5.3)
RBC # BLD: 3.8 M/UL (ref 3.95–5.11)
RBC # BLD: ABNORMAL 10*6/UL
REASON FOR REJECTION: NORMAL
SEG NEUTROPHILS: 80 % (ref 36–65)
SEGMENTED NEUTROPHILS ABSOLUTE COUNT: 13.93 K/UL (ref 1.5–8.1)
SODIUM BLD-SCNC: 138 MMOL/L (ref 135–144)
VDRL CSF SCREEN: NONREACTIVE
WBC # BLD: 17.4 K/UL (ref 3.5–11.3)
WBC # BLD: ABNORMAL 10*3/UL
ZZ NTE CLEAN UP: ORDERED TEST: NORMAL
ZZ NTE WITH NAME CLEAN UP: SPECIMEN SOURCE: NORMAL

## 2020-10-27 PROCEDURE — 99232 SBSQ HOSP IP/OBS MODERATE 35: CPT | Performed by: INTERNAL MEDICINE

## 2020-10-27 PROCEDURE — 99232 SBSQ HOSP IP/OBS MODERATE 35: CPT | Performed by: PSYCHIATRY & NEUROLOGY

## 2020-10-27 PROCEDURE — 82947 ASSAY GLUCOSE BLOOD QUANT: CPT

## 2020-10-27 PROCEDURE — 95718 EEG PHYS/QHP 2-12 HR W/VEEG: CPT | Performed by: PSYCHIATRY & NEUROLOGY

## 2020-10-27 PROCEDURE — 6360000002 HC RX W HCPCS: Performed by: INTERNAL MEDICINE

## 2020-10-27 PROCEDURE — 97535 SELF CARE MNGMENT TRAINING: CPT

## 2020-10-27 PROCEDURE — 85025 COMPLETE CBC W/AUTO DIFF WBC: CPT

## 2020-10-27 PROCEDURE — 2580000003 HC RX 258: Performed by: INTERNAL MEDICINE

## 2020-10-27 PROCEDURE — 2580000003 HC RX 258: Performed by: NURSE PRACTITIONER

## 2020-10-27 PROCEDURE — 6370000000 HC RX 637 (ALT 250 FOR IP): Performed by: INTERNAL MEDICINE

## 2020-10-27 PROCEDURE — 6360000002 HC RX W HCPCS: Performed by: NURSE PRACTITIONER

## 2020-10-27 PROCEDURE — 97110 THERAPEUTIC EXERCISES: CPT

## 2020-10-27 PROCEDURE — 95714 VEEG EA 12-26 HR UNMNTR: CPT

## 2020-10-27 PROCEDURE — 97166 OT EVAL MOD COMPLEX 45 MIN: CPT

## 2020-10-27 PROCEDURE — 6370000000 HC RX 637 (ALT 250 FOR IP): Performed by: FAMILY MEDICINE

## 2020-10-27 PROCEDURE — 36415 COLL VENOUS BLD VENIPUNCTURE: CPT

## 2020-10-27 PROCEDURE — 95720 EEG PHY/QHP EA INCR W/VEEG: CPT | Performed by: PSYCHIATRY & NEUROLOGY

## 2020-10-27 PROCEDURE — 80048 BASIC METABOLIC PNL TOTAL CA: CPT

## 2020-10-27 PROCEDURE — 6370000000 HC RX 637 (ALT 250 FOR IP): Performed by: PHYSICIAN ASSISTANT

## 2020-10-27 PROCEDURE — 2060000000 HC ICU INTERMEDIATE R&B

## 2020-10-27 PROCEDURE — 97116 GAIT TRAINING THERAPY: CPT

## 2020-10-27 PROCEDURE — 85055 RETICULATED PLATELET ASSAY: CPT

## 2020-10-27 RX ADMIN — LEVETIRACETAM 1500 MG: 500 TABLET, FILM COATED ORAL at 21:39

## 2020-10-27 RX ADMIN — LACOSAMIDE 200 MG: 100 TABLET, FILM COATED ORAL at 09:14

## 2020-10-27 RX ADMIN — LACOSAMIDE 200 MG: 100 TABLET, FILM COATED ORAL at 21:39

## 2020-10-27 RX ADMIN — ACETAMINOPHEN 650 MG: 325 TABLET ORAL at 21:39

## 2020-10-27 RX ADMIN — SODIUM CHLORIDE, PRESERVATIVE FREE 10 ML: 5 INJECTION INTRAVENOUS at 21:42

## 2020-10-27 RX ADMIN — SODIUM CHLORIDE, PRESERVATIVE FREE 10 ML: 5 INJECTION INTRAVENOUS at 09:20

## 2020-10-27 RX ADMIN — AMLODIPINE BESYLATE 10 MG: 10 TABLET ORAL at 09:14

## 2020-10-27 RX ADMIN — ENOXAPARIN SODIUM 40 MG: 40 INJECTION SUBCUTANEOUS at 09:15

## 2020-10-27 RX ADMIN — LEVETIRACETAM 1500 MG: 500 TABLET, FILM COATED ORAL at 09:13

## 2020-10-27 RX ADMIN — SODIUM CHLORIDE 200 MG: 9 INJECTION, SOLUTION INTRAVENOUS at 14:35

## 2020-10-27 ASSESSMENT — PAIN SCALES - GENERAL
PAINLEVEL_OUTOF10: 5
PAINLEVEL_OUTOF10: 0

## 2020-10-27 NOTE — PROGRESS NOTES
and RN agreeable to PT. Pt alert in be upon arrival, reports fatigue from previous OT treatment. Pleasant and cooperative t/o treatment. General Comment  Comments: Pt left in bed with call light within reach, alarm activated and telesitter in use  Pain Screening  Patient Currently in Pain: Denies  Vital Signs  Patient Currently in Pain: Denies       Orientation  Orientation  Overall Orientation Status: Within Functional Limits  Cognition      Objective   Bed mobility  Rolling to Left: Stand by assistance  Rolling to Right: Stand by assistance  Supine to Sit: Stand by assistance  Sit to Supine: Stand by assistance  Scooting: Stand by assistance  Comment: mildly impulsive  Transfers  Sit to Stand: Contact guard assistance  Stand to sit: Contact guard assistance  Comment: STS to RW  Ambulation  Ambulation?: Yes  Ambulation 1  Surface: level tile  Device: Rolling Walker  Assistance: Contact guard assistance;Minimal assistance  Quality of Gait: decreased safety awareness, high reliance on RW, running into obstacles on R side ([place] for navigation of RW)  Gait Deviations: Slow Cynthia;Decreased step length;Decreased step height  Distance: 100ft  Comments: limited by decreased endurance  Stairs/Curb  Stairs?: No(will attempt next session)     Balance  Posture: Fair  Sitting - Static: Good  Sitting - Dynamic: Good  Standing - Static: Fair;+  Standing - Dynamic: Fair  Comments: standing balance assessed with RW  Exercises  Straight Leg Raise: 10x supine  Quad Sets: 10x supine  Gluteal Sets: 10x supine  Ankle Pumps: 10x supine  Comments: Issued HEP for seated and supine B LE exs, all questions answered at this time     Goals  Short term goals  Time Frame for Short term goals: 10 days  Short term goal 1: Pt to ambulate 150ft without AD and no LOB. Short term goal 2: Pt to tolerate 20-30 mins ther ex/act for improved strength and endurance with ADL's.   Short term goal 3: Pt to demonstrate good dynamic standing balance for decrease fall risk. Short term goal 4: Pt to perform all bed mob, transfers, and gait independently. Patient Goals   Patient goals : get better    Plan    Plan  Times per week: 5-6 x week  Times per day: Daily  Current Treatment Recommendations: Strengthening, Neuromuscular Re-education, Home Exercise Program, Safety Education & Training, Balance Training, Endurance Training, Functional Mobility Training, Transfer Training, Gait Training, Stair training  Safety Devices  Type of devices:  All fall risk precautions in place, Bed alarm in place, Left in bed, Gait belt, Telesitter in use, Call light within reach, Nurse notified  Restraints  Initially in place: No     Therapy Time   Individual Concurrent Group Co-treatment   Time In 1450         Time Out 1514         Minutes 24         Timed Code Treatment Minutes: [address] [employer] [place], [place]

## 2020-10-27 NOTE — PROGRESS NOTES
Today's Date: 10/27/2020  Patient Name: Radha Glasgow  Date of admission: 10/22/2020 12:20 AM  Patient's age: 60 y.o., 1965  Admission Dx: New onset seizure ([facility] [address] [R56.9]    Reason for Consult: ovarian cancer   Requesting Physician: Nica Albert MD    CHIEF COMPLAINT:  No chief complaint on file. SUBJECTIVE:    Patient seen and examined  NO headache  No NV  Feels slightly better  +confusion  Thrombocytosis  HISTORY OF PRESENT ILLNESS:    Radha Glasgow is a 60 y.o.  female who is admitted to the hospital for altered mental status when she presented to [facility] and then subsequently transferred to [facility] for further care. She had limited MRI which showed possible acute stroke versus AL ES and has been evaluated by neurology. Patient has been receiving chemotherapy for recurrent ovarian cancer with Dr. Michaelle Clark as outpatient. She recently was started on Doxil and Avastin on 9/18. She received only 1 dose of Avastin. Brief oncologic history:  Patient follows with Dr. Michaelle Clark as outpatient. DIAGNOSIS:       Recurrent ovarian cancer. Original diagnosis 2005 with multiple recurrences. Diffuse metastasis. CURRENT THERAPY:         Doxil/ Avastin started 9/18/2020. Avastin will be discontinued due to recent GI bleeding. Status post recent vascular embolization    Past Medical History:   has a past medical history of Anemia, Bleeding, Cervical cancer ([address] Depression, Diabetes mellitus ([address] GERD (gastroesophageal reflux disease), Metastatic cancer ([address] Ovarian cancer ([address] Post chemo evaluation, and Splenic lesion. Past Surgical History:   has a past surgical history that includes Hysterectomy, total abdominal; Port Surgery; Tonsillectomy; IR PORT PLACEMENT > 5 YEARS (8/24/2020); Anus surgery; Abscess Drainage (2013); colectomy (03/2013); and IR EMBOLIZATION HEMORRHAGE (10/05/2020).      Medications:    Prior to Admission medications    Medication Sig Start Date End Date Taking? Authorizing Provider   omeprazole (PRILOSEC) 20 MG delayed release capsule Take 20 mg by mouth daily   Yes Historical Provider, MD   HYDROcodone-acetaminophen (NORCO) 5-325 MG per tablet Take 1 tablet by mouth every 6 hours as needed for Pain. Yes Historical Provider, MD   oxyCODONE (OXYCONTIN) 10 MG extended release tablet Take 10 mg by mouth every 12 hours. Yes Historical Provider, MD   prochlorperazine (COMPAZINE) 10 MG tablet Take 10 mg by mouth every 6 hours as needed   Yes Historical Provider, MD   tiZANidine (ZANAFLEX) 4 MG tablet Take 4 mg by mouth every 6 hours as needed   Yes Historical Provider, MD   ibuprofen (ADVIL;MOTRIN) 200 MG tablet Take 200 mg by mouth every 6 hours as needed for Pain   Yes Historical Provider, MD   oxyCODONE-acetaminophen (PERCOCET)  MG per tablet Take 1-2 tablets by mouth every 4 hours.  10/12/20   Historical Provider, MD   pantoprazole (PROTONIX) 40 MG tablet Take 40 mg by mouth 2 times daily    Historical Provider, MD   metFORMIN (GLUCOPHAGE-XR) 500 MG extended release tablet Take 2 tablets by mouth 2 times daily 10/5/20   Blane Meckel, MD   ferrous sulfate (IRON 325) 325 (65 Fe) MG tablet Take 1 tablet by mouth daily (with breakfast)  Patient not taking: Reported on 10/16/2020 9/18/20   Blane Meckel, MD   aspirin 81 MG chewable tablet Take 81 mg by mouth nightly    Historical Provider, MD   simvastatin (ZOCOR) 40 MG tablet Take 40 mg by mouth nightly    Historical Provider, MD   sertraline (ZOLOFT) 100 MG tablet Take 50 mg by mouth daily  8/8/20   Historical Provider, MD     Current Facility-Administered Medications   Medication Dose Route Frequency Provider Last Rate Last Dose    amLODIPine (NORVASC) tablet 10 mg  10 mg Oral Daily Treva Priets MD   10 mg at 10/27/20 0914    levETIRAcetam (KEPPRA) tablet 1,500 mg  1,500 mg Oral BID Brynn Maxwell MD   1,500 mg at 10/27/20 0913    lacosamide (VIMPAT) tablet 200 mg  200 mg Oral BID Gema Yoselyn Mclaughlin MD   200 mg at 10/27/20 0914    potassium chloride (KLOR-CON M) extended release tablet 40 mEq  40 mEq Oral PRN Brad Linky, APRN - CNP        Or    potassium bicarb-citric acid (EFFER-K) effervescent tablet 40 mEq  40 mEq Oral PRN Brad Jansky, APRN - CNP        Or    potassium chloride 10 mEq/100 mL IVPB (Peripheral Line)  10 mEq Intravenous PRN Brad Jansky, APRN - CNP        loperamide (IMODIUM) capsule 2 mg  2 mg Oral 4x Daily PRN Brad Jansky, APRN - CNP   2 mg at 10/24/20 2150    labetalol (NORMODYNE;TRANDATE) injection 5 mg  5 mg Intravenous Q6H PRN Francisco Ibarra MD        LORazepam (ATIVAN) injection 2 mg  2 mg Intravenous Once Delonte Sullivan DO        acetaminophen (TYLENOL) suppository 650 mg  650 mg Rectal Q6H PRN Kristen Silvan, APRN - CNP   650 mg at 10/23/20 0145    enoxaparin (LOVENOX) injection 40 mg  40 mg Subcutaneous Daily Kristen Silvan, APRN - CNP   40 mg at 10/27/20 0915    LORazepam (ATIVAN) injection 2 mg  2 mg Intravenous Q4H PRN Rkisten Silvan, APRN - CNP        nicotine (NICODERM CQ) 21 MG/24HR 1 patch  1 patch Transdermal Daily PRN Kristen Silvan, APRN - CNP        polyethylene glycol (GLYCOLAX) packet 17 g  17 g Oral Daily PRN Kristen Silvan, APRN - CNP        promethazine (PHENERGAN) tablet 12.5 mg  12.5 mg Oral Q6H PRN Kristen Silvan, APRN - CNP        Or    ondansetron [facility]) injection 4 mg  4 mg Intravenous Q6H PRN Kristen Silvan, APRN - CNP        sodium chloride flush 0.9 % injection 10 mL  10 mL Intravenous 2 times per day Kristen Silvan, APRN - CNP   10 mL at 10/27/20 0920    sodium chloride flush 0.9 % injection 10 mL  10 mL Intravenous PRN Kristen Silvan, APRN - CNP        lidocaine PF 2 % injection 5 mL  5 mL Intradermal Once Francisco Ibarra MD        acetaminophen (TYLENOL) tablet 650 mg  650 mg Oral Q4H PRN [employer] [facility]   650 mg at 10/26/20 2011       Allergies:  Patient has no known allergies.     Social History:   reports that she has been smoking. She has been smoking about 1.00 pack per day. She uses smokeless tobacco. She reports previous alcohol use. She reports that she does not use drugs. Family History: family history includes Alcohol Abuse in her mother; Cirrhosis in her mother. REVIEW OF SYSTEMS:    Constitutional: No fever or chills. No night sweats, no weight loss   Eyes: No eye discharge, double vision, or eye pain   HEENT: negative for sore mouth, sore throat, hoarseness and voice change   Respiratory: negative for cough , sputum, dyspnea, wheezing, hemoptysis, chest pain   Cardiovascular: negative for chest pain, dyspnea, palpitations, orthopnea, PND   Gastrointestinal: negative for nausea, vomiting, diarrhea, constipation, abdominal pain, Dysphagia, hematemesis and hematochezia   Genitourinary: negative for frequency, dysuria, nocturia, urinary incontinence, and hematuria   Integument: negative for rash, skin lesions, bruises.    Hematologic/Lymphatic: negative for easy bruising, bleeding, lymphadenopathy, or petechiae   Endocrine: negative for heat or cold intolerance,weight changes, change in bowel habits and hair loss   Musculoskeletal: negative for myalgias, arthralgias, pain, joint swelling,and bone pain   Neurological: negative for headaches, dizziness, seizures, weakness, numbness    PHYSICAL EXAM:      /65   Pulse 87   Temp 98.5 °F (36.9 °C) (Oral)   Resp 19   Ht 5' 6\" (1.676 m)   Wt 192 lb 10.9 oz (87.4 kg)   SpO2 96%   BMI 31.10 kg/m²    Temp (24hrs), Av °F (36.7 °C), Min:97.6 °F (36.4 °C), Max:98.5 °F (36.9 °C)    General appearance - well appearing, no in pain or distress   Eyes - pupils equal and reactive, extraocular eye movements intact   Ears - bilateral TM's and external ear canals normal   Mouth - mucous membranes moist, pharynx normal without lesions   Neck - supple, no significant adenopathy   Lymphatics - no palpable lymphadenopathy, no hepatosplenomegaly Chest - clear to auscultation, no wheezes, rales or rhonchi, symmetric air entry   Heart - normal rate, regular rhythm, normal S1, S2, no murmurs  Abdomen - soft, nontender, nondistended, no masses or organomegaly   Neurological -awake slightly confused,, normal speech, no focal findings or movement disorder noted   Musculoskeletal - no joint tenderness, deformity or swelling   Extremities - peripheral pulses normal, no pedal edema, no clubbing or cyanosis   Skin - normal coloration and turgor, no rashes, no suspicious skin lesions noted ,    DATA:    Labs:   CBC:   Recent Labs     10/26/20  0523 10/27/20  0606   WBC 17.0* 17.4*   HGB 9.4* 9.6*   HCT 32.7* 31.5*   * See Reflexed IPF Result     BMP:   Recent Labs     10/26/20  0523 10/27/20  0817    138   K 3.5* 4.2   CO2 21 23   BUN 4* 6   CREATININE 0.31* 0.34*   LABGLOM >60 >60   GLUCOSE 126* 121*     PT/INR:   No results for input(s): PROTIME, INR in the last 72 hours. IMAGING DATA:  @IMG@   XR CHEST PORTABLE   Final Result   No acute findings. MRI LIMITED BRAIN   Final Result   Limited MRI of the brain demonstrates moderate acute ischemia involving both   occipital lobes and left parietal lobe involving the cortices. Small acute punctate infarct within the posterior left frontal lobe near the   convexity. Hyperintense FLAIR signal abnormality within the cortices of both frontal   lobes and posterior left temporal lobe may represent subacute ischemia or   inflammatory process such as vasculitis. Posterior reversible   leukoencephalopathy is a possibility.          IR LUMBAR PUNCTURE FOR DIAGNOSIS   Final Result   Successful fluoroscopic-guided lumbar puncture.              Mri Limited Brain     Result Date: 10/23/2020  Limited MRI of the brain demonstrates moderate acute ischemia involving both occipital lobes and left parietal lobe involving the cortices Small acute punctate infarct within the posterior left frontal lobe near the convexity Hyperintense FLAIR signal mildly within the cortices of both frontal lobes and posterior left temporal lobe may represent subacute ischemia or inflammatory process such as vasculitis. Posterior reversible leukoencephalopathy is a possibility      Primary Problem  New onset seizure ([facility] [address]    Active Hospital Problems    Diagnosis Date Noted    Encephalopathy [G93.40]     PRES (posterior reversible encephalopathy syndrome) [I67.83]     Hemianopia, bitemporal [H53.47]     Type 2 diabetes mellitus without complication, without long-term current use of insulin ([address] [E11.9]     Anemia [D64.9]     Splenic lesion [D73.89]     Ovarian cancer ([facility] [address] [C56.9]     Acute encephalopathy [G93.40]     New onset seizure ([facility] [address] [R56.9] 10/21/2020    Malignant neoplasm of ovary ([facility] [address] [C56.9] 08/17/2020     IMPRESSION:   1. AMS  2. Posterior reversible encephalopathy syndrome (PRES);   3. Recurrent ovarian cancer started on chemotherapy with single agent Doxil and Avastin on 9/18/2020. Patient has received only 1 cycle. Because of the recent GI bleed and vascular embolization Avastin was discontinued and second cycle of chemotherapy was scheduled on 10/23  4. Leukocytosis  5. Anemia   6. Thrombocytosis      RECOMMENDATIONS:  1. I reviewed the laboratory data, diagnosis, prognosis and treatment options  2. I discussed with patient and family  3. Avastin can be associated with press syndrome. This is less likely secondary to Doxil. Regardless there was a plan to hold off Avastin down the road  4. Once acute issues resolve she will resume therapy with Doxil as outpatient  5. She would need repeat MRI brain for follow-up in few weeks  6. No acute oncologic interventions indicated at this point  7. Her thrombocytosis most likely reactive and also secondary to her splenic embolization and iron deficiency. 8. I will start her on iron supplement  9.  Patient will follow up with Dr. Michaelle Clark as outpatient after discharge  10. I had discussion with patient's family who were present with a video conference in patient's room      Discussed with patient and Nurse. Thank you for asking us to see this patient. Aryan Mack MD  Hematologist/Medical Oncologist    Cell: [phone]      This note is created with the assistance of a speech recognition program.  While intending to generate a document that actually reflects the content of the visit, the document can still have some errors including those of syntax and sound a like substitutions which may escape proof reading. It such instances, actual meaning can be extrapolated by contextual diversion.

## 2020-10-27 NOTE — PROGRESS NOTES
[facility]  Office: [address], DO, Albert Monica, DO, Americo Metz, DO, Kerry Braga, DO, Thao Michael MD, Jacques Wheatley MD, Felecia Coombs MD, Willian Brooks MD, Bull Willis MD, Rosalina Estrada MD, Ayana Robbins MD, Coco Booker MD, Mena Saldivar MD, Ana Weinstein DO, Barb Box MD, Rolanda Stone MD, Yoana Nicole DO, Sarita Reese MD,  Nathanial Apgar, DO, Jazmine Méndez MD, Rosalia Harrison MD, Susan Rao, [facility], [facility], CNP, Natacha Conner, CNP, Syl Gannon, CNS, Irma Kidd, CNP, Gregory Aguayo, CNP, Oj Anderson, CNP, Alice Sanchez, CNP, Dorian Manning, CNP, August Diaz PA-C, Manfred Allen, [facility], Jitendra Ott, CNP, Luis Bains, CNP, Jose Chaudhry, CNP, Laura Parnell, CNP, Minoo Quinonez, [facility] [place], [place]   [address] [facility]    Progress Note    10/27/2020    11:50 AM    Name:   Padmini Alexandra  MRN:     [id number]     Acct:      [de-identified]   Room:   [address] [place] [address] Day:  5  Admit Date:  10/22/2020 12:20 AM    PCP:   No primary care provider on file. Code Status:  Full Code    Subjective:     C/C: confusion   Interval History Status: not changed. Patient seen and examined  Patient feels weak she is still confused   Patient denies fever chest pain shortness of breath  I am seeing the patient for  seizure     Brief History:     60 F presented with seizure episode. Patient currently lethargic, not cooperating with [place] intermittently, disoriented to place. On chart review initially presented to [facility] with confusion. Noted to have seizure episode, no prior history of seizures. Started on antibiotics, transferred to [facility] for further neurological workup.      MRI concerning for PRES syndrome. LP negative. Neurology consulted, started on LTME, demonstrating seizure activity. AEDs adjusted by neurology. Mentation improved. Medications:      Allergies:  No Known Allergies    Current Meds:   Scheduled Meds:    amLODIPine  10 mg Oral Daily    levETIRAcetam  1,500 mg Oral BID    lacosamide  200 mg Oral BID    LORazepam  2 mg Intravenous Once    enoxaparin  40 mg Subcutaneous Daily    sodium chloride flush  10 mL Intravenous 2 times per day    lidocaine PF  5 mL Intradermal Once     Continuous Infusions:   PRN Meds: potassium chloride **OR** potassium alternative oral replacement **OR** potassium chloride, loperamide, labetalol, [DISCONTINUED] acetaminophen **OR** acetaminophen, LORazepam, nicotine, polyethylene glycol, promethazine **OR** ondansetron, sodium chloride flush, acetaminophen    Data:     Past Medical History:   has a past medical history of Anemia, Bleeding, Cervical cancer ([facility] [address] Depression, [facility] GERD (gastroesophageal reflux disease), Metastatic [facility] Ovarian cancer ([facility] Post chemo evaluation, and Splenic lesion. Social History:   reports that she has been smoking. She has been smoking about 1.00 pack per day. She uses smokeless tobacco. She reports previous alcohol use. She reports that she does not use drugs. Family History:   Family History   Problem Relation Age of Onset    Alcohol Abuse Mother     Cirrhosis Mother        Vitals:  /65   Pulse 87   Temp 98.5 °F (36.9 °C) (Oral)   Resp 19   Ht 5' 6\" (1.676 m)   Wt 192 lb 10.9 oz (87.4 kg)   SpO2 96%   BMI 31.10 kg/m²   Temp (24hrs), Av °F (36.7 °C), Min:97.6 °F (36.4 °C), Max:98.5 °F (36.9 °C)    Recent Labs     10/27/20  0849   POCGLU 120*       I/O (24Hr):   No intake or output data in the 24 hours ending 10/27/20 1150    Labs:  Hematology:  Recent Labs     10/25/20  0836 10/26/20  0523 10/27/20  0606   WBC 17.8* 17.0* 17.4*   RBC 3.78* 3.78* 3.80*   HGB 9.5* 9.4* 9.6*   HCT 33.3* 32.7* 31.5*   MCV 88.1 86.5 82.9   MCH 25.1* 24.9* 25.3   MCHC 28.5 28.7 30.5   RDW 21.0* 21.0* 21.2*   * 907* See Reflexed IPF Result   MPV 8.9 8.7 NOT REPORTED Chemistry:  Recent Labs     10/25/20  0836 10/26/20  0523 10/27/20  0817    138 138   K 3.4* 3.5* 4.2    104 104   CO2 23 21 23   GLUCOSE 128* 126* 121*   BUN 6 4* 6   CREATININE 0.31* 0.31* 0.34*   ANIONGAP 12 13 11   LABGLOM >60 >60 >60   GFRAA >60 >60 >60   CALCIUM 8.4* 8.9 9.1     Recent Labs     10/27/20  0849   POCGLU 120*     ABG:No results found for: POCPH, PHART, PH, POCPCO2, RCU6MBH, PCO2, POCPO2, PO2ART, PO2, POCHCO3, NTB4UKA, HCO3, NBEA, PBEA, BEART, BE, THGBART, THB, JZL1XNS, HJXC3UQG, G0EKCHWR, O2SAT, FIO2  Lab Results   Component Value Date/Time    SPECIAL RIGHT HAND AMOUNT UNKNOWN 10/23/2020 01:08 AM    SPECIAL LT HAND AMOUNT UNKNOWN 10/23/2020 01:08 AM     Lab Results   Component Value Date/Time    CULTURE NO GROWTH 4 DAYS 10/23/2020 01:08 AM    CULTURE NO GROWTH 4 DAYS 10/23/2020 01:08 AM       Radiology:  [facility] Chest Portable    Result Date: 10/24/2020  No acute findings. Ir Lumbar Puncture For Diagnosis    Result Date: 10/22/2020  Successful fluoroscopic-guided lumbar puncture. Mri Limited Brain    Result Date: 10/23/2020  Limited MRI of the brain demonstrates moderate acute ischemia involving both occipital lobes and left parietal lobe involving the cortices. Small acute punctate infarct within the posterior left frontal lobe near the convexity. Hyperintense FLAIR signal abnormality within the cortices of both frontal lobes and posterior left temporal lobe may represent subacute ischemia or inflammatory process such as vasculitis. Posterior reversible leukoencephalopathy is a possibility.        Physical Examination:        General appearance:  alert  Lungs:  clear to auscultation bilaterally, normal effort  Heart:  regular rate and rhythm, no murmur  Abdomen:  soft, nontender, nondistended, normal bowel sounds, no masses, hepatomegaly, splenomegaly  Extremities:  no edema, redness, tenderness in the calves  Skin:  no gross lesions, rashes, induration    Assessment: Hospital Problems           Last Modified POA    * (Principal) New onset seizure ([address] 10/22/2020 Yes    Malignant neoplasm of ovary ([address] 10/22/2020 Yes    Type 2 diabetes mellitus without complication, without long-term current use of insulin ([address] 10/22/2020 Yes    Anemia 10/22/2020 Yes    Splenic lesion 10/22/2020 Yes    Ovarian cancer ([address] 10/22/2020 Yes    Overview Signed 10/22/2020  1:05 AM by ESTRELLITA Montoya CNP     low grade serous ovarian carcinoma         Acute encephalopathy 10/22/2020 Yes    PRES (posterior reversible encephalopathy syndrome) 10/23/2020 Yes    Hemianopia, bitemporal 10/23/2020 Yes    Encephalopathy 10/27/2020 Yes          Plan:        Principal Problem:    New onset seizure ([address] LTM EEG started on them but neurology following    Acute metabolic encephalopathy probably related to seizure and cancer medication  May need rehab ne evidence of infection as per ID   Active Problems:    Malignant neoplasm of ovary ([address] Follow up with hemonc       Type 2 diabetes mellitus without complication, without long-term current use of insulin (HCC)  ISS      Anemia SEC to cancer and chemothrerapy transfuse if hgb below 7      Splenic lesion s/p embolization       PRES (posterior reversible encephalopathy syndrome) sec to chemotherapy     Hemianopia, bitemporal neurology following       Thrombocytosis monitor asa      Hypokalemia replaced     Hedy Pinon MD  10/27/2020  11:50 AM

## 2020-10-27 NOTE — FLOWSHEET NOTE
Advance Care Planning     Advance Care Planning Activator (Inpatient)  Conversation Note      Date of ACP Conversation: 10/21/2020    Conversation Conducted with: Patient with Decision Making Capacity    ACP Activator: Damir Mercado    *When Decision Maker makes decisions on behalf of the incapacitated patient: Decision Maker is asked to consider and make decisions based on patient values, known preferences, or best interests. Health Care Decision Maker:     Current Designated Health Care Decision Maker:   Primary Decision Maker: Toy Farris - Child - [phone]    Secondary Decision Maker: Bernice Ambrose - Child - [phone]      Conversation Outcomes:  [x] ACP discussion completed  [] Existing advance directive reviewed with patient; no changes to patient's previously recorded wishes  [x] New Advance Directive completed  [] Portable Do Not Rescitate prepared for Provider review and signature  [] POLST/POST/MOLST/MOST prepared for Provider review and signature      Follow-up plan:    [] Schedule follow-up conversation to continue planning  [] Referred individual to Provider for additional questions/concerns   [] Advised patient/agent/surrogate to review completed ACP document and update if needed with changes in condition, patient preferences or care setting    [] This note routed to one or more involved healthcare providers          10/27/20 1950   Encounter Summary   Services provided to: Patient   Referral/Consult From: Multi-disciplinary team   Support System Children   Continue Visiting   (10/27/20)   Complexity of Encounter High   Length of Encounter 1 hour   Spiritual Assessment Completed Yes   Advance Care Planning Yes   Routine   Type Initial   Assessment Anxious; Fearful   Intervention Active listening;Explored feelings, thoughts, concerns;Explored coping resources;Nurtured hope   Outcome Expressed gratitude;Comfort; Less anxious, less agitated

## 2020-10-27 NOTE — PROGRESS NOTES
Occupational Therapy   Occupational Therapy Initial Assessment  Date: 10/27/2020   Patient Name: Pedrito Montanez  MRN: [id number]     : 1965    Date of Service: 10/27/2020    Discharge Recommendations: Further therapy recommended at discharge. Equipment recommendations listed below are based on what the patient would need if they were able to return to prior living arrangements at the time of discharge. OT Equipment Recommendations  Equipment Needed: Yes  Mobility Devices: ADL Assistive Devices  ADL Assistive Devices: Transfer Tub Bench    Assessment   Performance deficits / Impairments: Decreased functional mobility ; Decreased endurance;Decreased ADL status; Decreased high-level IADLs;Decreased safe awareness  Prognosis: Good  Decision Making: Medium Complexity  OT Education: OT Role;Plan of Care;Transfer Training  REQUIRES OT FOLLOW UP: Yes  Activity Tolerance  Activity Tolerance: Patient Tolerated treatment well;Patient limited by fatigue;Treatment limited secondary to decreased cognition  Safety Devices  Safety Devices in place: Yes  Type of devices: All fall risk precautions in place;Call light within reach;Gait belt;Left in bed;Bed alarm in place;Nurse notified  Restraints  Initially in place: No         Patient Diagnosis(es): There were no encounter diagnoses. has a past medical history of Anemia, Bleeding, Cervical cancer ([facility] Depression, Diabetes mellitus ([facility] GERD (gastroesophageal reflux disease), Metastatic cancer ([facility] Ovarian cancer ([facility] Post chemo evaluation, and Splenic lesion. has a past surgical history that includes Hysterectomy, total abdominal; Port Surgery; Tonsillectomy; IR PORT PLACEMENT > 5 YEARS (2020); Anus surgery; Abscess Drainage (); colectomy (2013); and IR EMBOLIZATION HEMORRHAGE (10/05/2020).          Restrictions  Restrictions/Precautions  Restrictions/Precautions: General Precautions, Fall Risk, Seizure  Required Braces or Orthoses?: No    Subjective   General  Patient assessed for rehabilitation services?: Yes  Family / Caregiver Present: Yes(Son)  Diagnosis: Seizure, AMS  Patient Currently in Pain: Denies  Pain Assessment  Pain Assessment: 0-10  Pain Level: 0  Vital Signs  Patient Currently in Pain: Denies  Social/Functional History  Social/Functional History  Lives With: Son(adult son)  Type of Home: Trailer  Home Layout: One level  Home Access: Stairs to enter with rails  Entrance Stairs - Number of Steps: 3  Entrance Stairs - Rails: Left  Bathroom Shower/Tub: Tub/Shower unit  Bathroom Toilet: Standard  Bathroom Equipment: Grab bars in shower  Bathroom Accessibility: Accessible  Receives Help From: Family  ADL Assistance: Independent  Homemaking Assistance: Independent  Homemaking Responsibilities: Yes  Ambulation Assistance: Independent  Transfer Assistance: Independent  Active : Yes  Mode of Transportation: Car  Occupation: Full time employment  Type of occupation: Pharmacy  Additional Comments: Pt states she has had about 3-4 falls this year; no DME. Objective   Vision: Within Functional Limits  Hearing: Within functional limits    Orientation  Overall Orientation Status: Within Functional Limits     Balance  Sitting Balance: Supervision  Standing Balance: Contact guard assistance  Standing Balance  Time: 10 min  Activity: Pt stood bedside, sinkside, in shower, and func mob to/from bathroom  Functional Mobility  Functional - Mobility Device: No device(HHA used)  Activity: To/from bathroom  Assist Level: Minimal assistance  Functional Mobility Comments: 1 significant LOB when stepping into shower, pt moving slow, CTA  Shower Transfers  Shower - Transfer Type: To and From  Shower - Technique: Ambulating  Shower Transfers: Minimal assistance  ADL  Feeding: Modified independent   Grooming: Supervision  UE Bathing: Stand by assistance; Increased time to complete;Verbal cueing  LE Bathing: Contact guard assistance; Increased time to complete;Verbal cueing  UE Dressing: Contact guard assistance; Increased time to complete  LE Dressing: Minimal assistance; Increased time to complete  Toileting: Minimal assistance; Increased time to complete  Additional Comments: Pt sitting for full ADL showering activity, some confusion noted, pt stood sinkside for oral care task, pt doffed/donned gown sitting on shower chair  Tone RUE  RUE Tone: Normotonic  Tone LUE  LUE Tone: Normotonic  Coordination  Movements Are Fluid And Coordinated: No  Coordination and Movement description: Decreased speed     Bed mobility  Supine to Sit: Stand by assistance  Sit to Supine: Stand by assistance  Scooting: Supervision  Comment: Pt supine in bed ada arrival/exit with alarm on, PTA entering room soon after OT session  Transfers  Sit to stand: Contact guard assistance  Stand to sit: Stand by assistance     Cognition  Overall Cognitive Status: Exceptions  Following Commands: Follows multistep commands with repitition; Follows multistep commands with increased time  Problem Solving: Assistance required to generate solutions;Assistance required to implement solutions;Assistance required to correct errors made;Assistance required to identify errors made  Insights: Decreased awareness of deficits  Cognition Comment: Pt confused in shower when placing bottles on railing instead of shelf, emotional at end of session        Sensation  Overall Sensation Status: WFL      LUE AROM (degrees)  LUE AROM : WFL  RUE AROM (degrees)  RUE AROM : WFL  LUE Strength  Gross LUE Strength: WFL  L Hand General: 5/5  RUE Strength  Gross RUE Strength: WFL  R Hand General: 5/5      Plan   Plan  Times per week: 3-5x        AM-PAC Score        AM-PAC Inpatient Daily Activity Raw Score: 18 (10/27/20 1628)  AM-PAC Inpatient ADL T-Scale Score : 38.66 (10/27/20 1628)  ADL Inpatient CMS 0-100% Score: 46.65 (10/27/20 1628)  ADL Inpatient CMS G-Code Modifier : CK (10/27/20 1628)    Goals  Short term goals  Time Frame for Short term goals: Pt will by discharge  Short term goal 1: demo good safety awareness during func mob around room using LRD and mod I  Short term goal 2: demo ADL UB/LB dressing/bathing activity with mod I and increased time  Short term goal 3: demo 6-step func activity with 1 vc only to address cognitive concerns  Short term goal 4: demo bending/reaching func activity while standing using LRD and mod I  Short term goal 5: demo EC/WS tech's during func activity around room with 1 vc only     Therapy Time   Individual Concurrent Group Co-treatment   Time In 1410         Time Out 1441         Minutes 31         Timed Code Treatment Minutes: 26 Minutes       Dorota Silva, OTR/L

## 2020-10-27 NOTE — CARE COORDINATION
Muna Jaimes called, phone number [phone]. He states that he knows his mom is going to need 24 hour care and she is totally against SNF, but after the next 1-2 weeks, he has to have his stem cells harvested and start chemotherapy. He asked for a SNF list, but not to share with his mom at this time. He asks that we keep encouraging her to go to SNF. SNF list can be obtained by CNM if son needs to review. 1750hrs. Met with pt. And states, \"I want my POA cancelled, I can take care of myself. I am not going even one day to a skilled nursing home. I don't like being told what I should do. \"   Discussed what type of support she has at home and she said she has some friends and home care may help. Home care list was left earlier with her and her son. [address] from  services calls and states that there is no POA in the medical chart now at [facility]. Marilyns. He will discuss with Shanell Toney, risk mng tomorrow. Will speak with pt. This evening about her concerns.

## 2020-10-27 NOTE — PROGRESS NOTES
NEUROLOGY INPATIENT PROGRESS NOTE    10/27/2020         Subjective: Geena Wills is a  60 y.o. female admitted on 10/22/2020 with New onset seizure [facility]) [R56.9]    Briefly, this is a  60 y.o. female admitted on 10/22/2020 as a transfer from [facility].  She initially presented there with concerns of confusion after receiving vaccinations (influenza and pneumococcal). While at [facility] she reportedly had an episode of seizure, generalized clonic in nature. She was given Ativan and loaded with Keppra. Initial lab work showed marked leukocytosis and patient was given antibiotics for suspected CNS infection. Upon arrival at [facility], patient continued to be lethargic and somnolent. No nuchal rigidity was initially noted however patient did have low-grade fever requiring Tylenol. Patient underwent an lumbar puncture which did not reveal any CNS infection. MRI brain had findings suspicious for PRES. Patient was started on LTME. ID and heme-onc consult obtained. Of note, patient has a history of ovarian cancer with initial diagnosis in 2005 with multiple recurrence. Follows with Dr. Tracy Sharp as outpatient. She was recently started on Doxil and Avastin on 9/18/2020. She received 1 dose of Avastin. This was discontinued due to recent GI bleeding at [facility] for which patient underwent embolization by IR for intra-abdominal bleeding due to splenic mass with GI infiltration. She received approximately 5 units of PRBC. Per heme-onc consult note, Avastin can be associated with PRES less likely secondary to Doxil. Avastin was discontinued anyhow and plan at this time is to resume with Doxil once able as outpatient. Today, she is reporting improvement in headache when compared to yesterday. Denies any seizure activity. Improved mentation. Blurry vision is getting better. No current facility-administered medications on file prior to encounter.       Current Outpatient Medications on File Prior to Encounter   Medication Sig Dispense Refill    omeprazole (PRILOSEC) 20 MG delayed release capsule Take 20 mg by mouth daily      HYDROcodone-acetaminophen (NORCO) 5-325 MG per tablet Take 1 tablet by mouth every 6 hours as needed for Pain.  oxyCODONE (OXYCONTIN) 10 MG extended release tablet Take 10 mg by mouth every 12 hours.  prochlorperazine (COMPAZINE) 10 MG tablet Take 10 mg by mouth every 6 hours as needed      tiZANidine (ZANAFLEX) 4 MG tablet Take 4 mg by mouth every 6 hours as needed      ibuprofen (ADVIL;MOTRIN) 200 MG tablet Take 200 mg by mouth every 6 hours as needed for Pain      oxyCODONE-acetaminophen (PERCOCET)  MG per tablet Take 1-2 tablets by mouth every 4 hours.  pantoprazole (PROTONIX) 40 MG tablet Take 40 mg by mouth 2 times daily      metFORMIN (GLUCOPHAGE-XR) 500 MG extended release tablet Take 2 tablets by mouth 2 times daily 30 tablet 2    ferrous sulfate (IRON 325) 325 (65 Fe) MG tablet Take 1 tablet by mouth daily (with breakfast) (Patient not taking: Reported on 10/16/2020) 60 tablet 5    aspirin 81 MG chewable tablet Take 81 mg by mouth nightly      simvastatin (ZOCOR) 40 MG tablet Take 40 mg by mouth nightly      sertraline (ZOLOFT) 100 MG tablet Take 50 mg by mouth daily          Allergies: [place]  has No Known Allergies. Past Medical History:   Diagnosis Date    Anemia     Bleeding 10/2020    intra-abdominal bleeding -due to splenic mass with GI infiltration. Status post embolization    [facility]ervical [facility]     Depression     Diabetes mellitus ([facility]     GERD (gastroesophageal reflux disease)     Metastatic cancer ([facility] 10/2020    extensive intraabdominal and splenic involvement and lung mets.     Ovarian cancer ([facility]     low grade serous ovarian carcinoma    Post chemo evaluation     2007: Chemo via med onc (Dr. Lui Burrell), 2008: Chemo due to rising CA-125, 2013: intraperitoneal chemo,12/2015: Ca125 - 25     Splenic lesion        Past Surgical History:   Procedure Laterality Date    ABSCESS DRAINAGE  2013    Franca rectal    ANUS SURGERY      ANAL FISSURECTOMY    COLECTOMY  03/2013    ex lap, tumor debulking, transverse colectomy w reanastamosis, subgastric omentectomy, intraperitoneal port placement    HYSTERECTOMY, TOTAL ABDOMINAL      IR EMBOLIZATION HEMORRHAGE  10/05/2020    intra-abdominal bleeding -due to splenic mass with GI infiltration. Status post embolization [employer] interlock coils x7. mri condtional 3t ok, safe immediately post implant.  IR PORT PLACEMENT EQUAL OR GREATER THAN 5 YEARS  8/24/2020    IR PORT PLACEMENT EQUAL OR GREATER THAN 5 YEARS 8/24/2020 Allison Heaton MD [facility]    PORT SURGERY      IP Port    TONSILLECTOMY         Medications:     amLODIPine  10 mg Oral Daily    levETIRAcetam  1,500 mg Oral BID    lacosamide  200 mg Oral BID    LORazepam  2 mg Intravenous Once    enoxaparin  40 mg Subcutaneous Daily    sodium chloride flush  10 mL Intravenous 2 times per day    lidocaine PF  5 mL Intradermal Once     PRN Meds include: potassium chloride **OR** potassium alternative oral replacement **OR** potassium chloride, loperamide, labetalol, [DISCONTINUED] acetaminophen **OR** acetaminophen, LORazepam, nicotine, polyethylene glycol, promethazine **OR** ondansetron, sodium chloride flush, acetaminophen    Objective:   /69   Pulse 87   Temp 97.6 °F (36.4 °C) (Oral)   Resp 24   Ht 5' 6\" (1.676 m)   Wt 192 lb 10.9 oz (87.4 kg)   SpO2 94%   BMI 31.10 kg/m²     Blood pressure range: Systolic (19RQP), TPO:445 , Min:134 , UVB:500   ; Diastolic (80BOC), BJS:29, Min:69, Max:81      ROS:  CONSTITUTIONAL: negative for fatigue and malaise   EYES: Positive for vision changes.  Negative for double vision and photophobia    HEENT: negative for tinnitus and sore throat   RESPIRATORY: negative for cough, shortness of breath   CARDIOVASCULAR: negative for chest pain, palpitations, or syncope   GASTROINTESTINAL: negative for abdominal pain, nausea, vomiting, diarrhea, or constipation    GENITOURINARY: negative for incontinence or retention    MUSCULOSKELETAL: negative for neck or back pain, negative for extremity pain   NEUROLOGICAL: Negative for seizures, headaches, weakness, numbness, confusion, aphasia, dysarthria   PSYCHIATRIC: negative for agitation, hallucination, SI/HI   SKIN Negative for spontaneous contusions, rashes, or lesions        NEUROLOGIC EXAMINATION  GENERAL  Appears comfortable and in no distress   HEENT  NC/ AT   HEART  S1 and S2 heard; palpation of pulses: radial pulse    NECK  Supple and no bruits heard   MENTAL STATUS:  Alert, oriented, intact memory, no confusion, normal speech, normal language, no hallucination or delusion   CRANIAL NERVES: II     -      Bitemporal hemianopsia  III,IV,VI -  PERR, EOMs full, no ptosis  V     -     Normal facial sensation   VII    -     Normal facial symmetry  VIII   -     Intact hearing   IX,X -     Symmetrical palate  XI    -     Symmetrical shoulder shrug  XII   -     Midline tongue, no atrophy    MOTOR FUNCTION: RUE: Significant for good strength of grade 5/5 in proximal and distal muscle groups   LUE: Significant for good strength of grade 5/5 in proximal and distal muscle groups   RLE: Significant for good strength of grade 5/5 in proximal and distal muscle groups   LLE: Significant for good strength of grade 5/5 in proximal and distal muscle groups      Normal bulk, normal tone and no involuntary movements, no tremor   SENSORY FUNCTION:  Normal touch, normal pin, normal vibration, normal proprioception   CEREBELLAR FUNCTION:  Intact fine motor control over upper limbs and lower limbs   REFLEX FUNCTION:  Symmetric in upper and lower extremities, no Babinski sign   STATION and GAIT  Not tested, connected with vEEG     Data:    Lab Results:   CBC:   Recent Labs     10/25/20  0836 10/26/20  0523 10/27/20  0606 WBC 17.8* 17.0* 17.4*   HGB 9.5* 9.4* 9.6*   * 907* See Reflexed IPF Result     BMP:    Recent Labs     10/24/20  0844 10/25/20  0836 10/26/20  0523    138 138   K 3.5* 3.4* 3.5*    103 104   CO2 22 23 21   BUN 6 6 4*   CREATININE 0.35* 0.31* 0.31*   GLUCOSE 124* 128* 126*         Lab Results   Component Value Date    ALT 5 10/23/2020    AST 10 10/23/2020    TSH 0.93 10/22/2020    INR 1.0 10/23/2020    [id number] 405 10/22/2020       No results found for: PHENYTOIN, PHENYTOIN, VALPROATE, CBMZ    IMAGING:  \"Care Everywhere Tab\"  CT Head [facility]) - \"Negative brain CT. \"  CTA Head and Neck with contrast [facility]) -   \"Negative CT angiogram [place] [facility]. \"  \"Essentially negative CT angiogram cervical portion of the carotid arteries. \"    XR CHEST PORTABLE   Final Result   No acute findings. MRI LIMITED BRAIN   Final Result   Limited MRI of the brain demonstrates moderate acute ischemia involving both   occipital lobes and left parietal lobe involving the cortices. Small acute punctate infarct within the posterior left frontal lobe near the   convexity. Hyperintense FLAIR signal abnormality within the cortices of both frontal   lobes and posterior left temporal lobe may represent subacute ischemia or   inflammatory process such as vasculitis. Posterior reversible   leukoencephalopathy is a possibility. IR LUMBAR PUNCTURE FOR DIAGNOSIS   Final Result   Successful fluoroscopic-guided lumbar puncture. LTME started 10/22/20 @ 18:57 reviewed through 10/26/20 @ 07:00  See full report for complete details  Following is report from Day 5  Summary: During this day of recording no events were recorded. The interictal EEG was abnormal due to diffuse polymorphic delta and theta slowing suggesting moderate to severe encephalopathy.   Near continuous left central, parietal and temporal sharp wave discharges with fast activity c/w LPDs+F+R conferred increased risk for focal onset seizures. Monitoring was continued in order to record the patient's typical events. The EKG channel revealed no abnormalities.        AHMED Willo Denver, MD  Diplomate, American Board of Psychiatry and Neurology  Diplomate, American Board of Clinical Neurophysiology  Diplomate, American Board of Epilepsy     LTME reading continued 10/26/2020 @ 7AM by Dr. Magali Middleton  See full report for complete details  Day 2 - 10/27/2020    AEDs:  Vimpat 200mg BID; Keppra 1500mg BID; Ativan PRN    Interictal: Periodic sharp waves in bilateral parietal/occipital, more on left. There was continuous slow in 2-6Hz, at times, suboptimal recording due to ample lead artifacts. Posterior dominant was at [facility].    Ictal:      Summary: During above recoding period, there was seizure risk in both posterior head regions, left > right. There was evidence of moderate to severe diffuse encephalopathy. Assessment and Plan  64-year-old female with recurrent ovarian cancer initially admitted for confusion and new onset seizures. Likely PRES (posterior reversible encephalopathy syndrome), secondary to immunosuppression, with a possible adverse effect more likely due to Avastin which has been already discontinued due to GI bleed per oncology note, with a lesser possibility secondary to Doxil. Heme/onc recommendations noted and appreciated. PRES also could be secondary to recent blood pressure elevations. Currently on amlodipine 10mg. Focal onset seizures secondary to PRES. Likely DC LTME today. Continue Keppra to 1.5 g twice daily and Vimpat to 200 mg twice daily. Seizure precautions  Leukocytosis with no identifiable source at this time. ID recommendations noted and appreciated. Will monitor off antibiotics at this time  DVT prophylaxis    Further recommendations may follow after discussing with attending physician.       Joli Angelucci, MD   PGY-2 Neurology  10/27/2020  8:02 AM

## 2020-10-27 NOTE — PLAN OF CARE
Problem: Skin Integrity:  Goal: Will show no infection signs and symptoms  10/27/2020 1529 by Aurora Malone RN  Outcome: Not Met This Shift  10/27/2020 0451 by Ani Turk RN  Outcome: Ongoing  Goal: Absence of new skin breakdown  10/27/2020 1529 by Aurora Malone RN  Outcome: Not Met This Shift  10/27/2020 0451 by Ani Turk RN  Outcome: Ongoing

## 2020-10-27 NOTE — PROGRESS NOTES
[facility] - Progress Note  Today's Date and Time: 10/27/2020, 8:09 AM    Impression :   · New onset seizure  · Metabolic encephalopathy  · Hyponatremia  · Diabetes mellitus type 2  · Anemia  · Splenic lesion s/p IR embolization   · Ovarian cancer (2005) with multiple recurrences   · Metastatic ovarian cancer to abdomen, liver, lungs, spleen  · Leukocytosis   · No bacterial meningitis or viral encephalitis  · Post-LP headache    Recommendations:   · Monitor off antibiotics  · Acyclovir discontinued    Medical Decision Making/Summary/Discussion:10/27/2020     · Patient with metastatic ovarian carcinoma  · Admitted post seizure  · Hyponatremic at that time. Hyponatremia since corrected  · Concern raised for infection because of leucocytosis, abnormalities in MRI. · The latter are probably vascular in nature, suspecting PRES   · CSF and clinical exam do not support bacterial meningitis or viral/parasitic encephalitis. · Will Monitor off antibiotics   · Frontal headache consistent with Post-LP headache    Infection Control Recommendations   · Tyler Precautions    Antimicrobial Stewardship Recommendations     · Discontinuation of therapy  Coordination of Outpatient Care:   · Estimated Length of IV antimicrobials:10-23-20  · Patient will need Midline Catheter Insertion: no  · Patient will need PICC line Insertion:no  · Patient will need: Home IV , [place],  SNF,  LTAC: TBD  · Patient will need outpatient wound care:No    Chief complaint/reason for consultation:   · Fever, leukocytosis     History of Present Illness:   Mario Alex is a 60y.o.-year-old  female who was initially admitted on 10/22/2020. Patient seen at the request of Dr. Lola Pablo. INITIAL HISTORY:     Patient presented to [facility] with concerns of confusion after receiving influenza and pneumonia pneumococcal vaccines.       Per nursing report patient had an episode of a seizure in the emergency room at [facility]. At that time she was loaded with Keppra and given Ativan. She also was started on acyclovir, vancomycin, ceftriaxone and transferred to [facility] for further evaluation. Upon arrival to [place] [address] she was difficult to arouse but responsive to noxious stimuli. Subsequently when she was examined on the Neurology unit she was alert, cogent, followed commands, was able to move all extremities and bend her neck to touch her chest without any problems. Ovarian carcinoma history:  Patient has history of ovarian cancer with multiple recurrences. 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 She underwent surgical debulking and systemic treatment with Taxol and carboplatin for 6 cycles. Patient was in remission for about 2 years. She had a relapse in 2007 and was treated with topotecan with good results. Patient relapsed again in 2008 and was treated with Taxol carboplatin. After 3 cycles of systemic chemotherapy she had problems with carboplatin so she finished 3 more cycles with Taxol. She did well again for 2 to 3 years until she had a relapse in 2012 and she had intraperitoneal chemotherapy treatment with Taxol. Patient was last seen by her oncologist in 2014 at which time she had relatively stable disease with normal tumor marker and no significant abnormal images. Patient moved to [place] after that and she was lost for oncology follow-ups. Patient was recently evaluated again here in [address] [place] [address] because of abdominal discomfort. Re-imaging confirmed metastatic relapse. Biopsy confirmed ovarian cancer recurrence. Scan showed extensive intraabdominal and splenic involvement and lung mets. Patient was receiving therapy with Doxil/Avastini as of 9-.      Recent Hospitalization:  Patient seen at [place] [address] for an upper GI bleed.  She underwent embolization with interventional radiology for intra-abdominal bleeding due to a splenic mass with GI infiltration. She received approximately 5 units of blood for marked anemia. CURRENT EVALUATION: 10/27/2020    Afebrile   VS stable     Patient seen and examined at bedside this morning, lying comfortably in bed. No acute overnight events. Reports feeling a little better this morning, frontal headache improved from yesterday. Denies fevers, chills, nausea, vomiting, or blurry vision. Leukocytosis stable at 17 range. All cultures remain negative, no suspicion for meningitis. Neurology continues to evaluate on LTME. Labs, X rays reviewed: 10/27/2020    BUN: 4-->6-->4-->6  Cr: 0.4-->0.31-->0.31-->0.34    WBC: 24.2-->22-->17.8-->17-->17.4  Hb: 10-->9.8-->9.5-->9.4-->9.6  Plat: 691-->741-->887-->907    Cultures:  Urine:  ·   Blood:  · 10-23-20: no growth   Sputum :  ·   Wound:  ·   CSF:  · 10-23-20:  No growth   · Molecular/PCR of CSF 10-23-20: negative     Cryptococcal Antigen, CSF negative  Urinalysis 10-24-20:  Negative          Discussed with patient, RN, daughter and son (over the phone). I have personally reviewed the past medical history, past surgical history, medications, social history, and family history, and I have updated the database accordingly. Past Medical History:     Past Medical History:   Diagnosis Date    Anemia     Bleeding 10/2020    intra-abdominal bleeding -due to splenic mass with GI infiltration. Status post embolization    Cervical cancer ([address]     Depression     Diabetes mellitus ([address]     GERD (gastroesophageal reflux disease)     [facility] 10/2020    extensive intraabdominal and splenic involvement and lung mets.     Ovarian cancer ([address]     low grade serous ovarian carcinoma    Post chemo evaluation     2007: Chemo via med onc (Dr. Yassine Sterling), 2008: Obey Zavala due to rising CA-125, 2013: intraperitoneal chemo,12/2015: Ca125 - 25     Splenic lesion        Past Surgical  History:     Past phone: Not on file     Gets together: Not on file     Attends [employer] service: Not on file     Active member of club or organization: Not on file     Attends meetings of clubs or organizations: Not on file     Relationship status: Not on file    Intimate partner violence     Fear of current or ex partner: Not on file     Emotionally abused: Not on file     Physically abused: Not on file     Forced sexual activity: Not on file   Other Topics Concern    Not on file   Social History Narrative    Not on file       Family History:     Family History   Problem Relation Age of Onset    Alcohol Abuse Mother     Cirrhosis Mother         Allergies:   Patient has no known allergies. Review of Systems:   Constitutional: No fevers or chills. No systemic complaints  Head: No headaches  Eyes: No double vision or blurry vision. No conjunctival inflammation. ENT: No sore throat or runny nose. No hearing loss, tinnitus or vertigo. Cardiovascular: No chest pain or palpitations. No shortness of breath. No CONTRERAS  Lung: No shortness of breath or cough. No sputum production  Abdomen: No nausea, vomiting, diarrhea, or abdominal pain. No cramps. Genitourinary: No increased urinary frequency, or dysuria. No hematuria. No suprapubic or CVA pain  Musculoskeletal: No muscle aches or pains. No joint effusions, swelling or deformities  Hematologic: No bleeding or bruising. Neurologic: Frontal headache. No weakness, numbness, or tingling. Seizure x 1  Integument: No rash, no ulcers. Psychiatric: No depression. Endocrine: No polyuria, no polydipsia, no polyphagia.     Physical Examination :     Patient Vitals for the past 8 hrs:   BP Temp Temp src Pulse Resp SpO2   10/27/20 0015 134/69 97.6 °F (36.4 °C) Oral 87 24 94 %     General Appearance: Awake, alert, and in no apparent distress  Head:  Normocephalic, no trauma  Eyes: Pupils equal, round, reactive to light and accommodation; extraocular movements intact; sclera anicteric; conjunctivae pink. No embolic phenomena. ENT: Oropharynx clear, without erythema, exudate, or thrush. No tenderness of sinuses. Mouth/throat: mucosa pink and moist. No lesions. Dentition in good repair. Neck:Supple, without lymphadenopathy. Thyroid normal, No bruits. Pulmonary/Chest: Clear to auscultation, without wheezes, rales, or rhonchi. No dullness to percussion. Cardiovascular: Regular rate and rhythm without murmurs, rubs, or gallops. Abdomen: Soft, non tender. Bowel sounds normal. No organomegaly  All four Extremities: No cyanosis, clubbing, edema, or effusions. Neurologic: Able to move extremities, follows all commands  Skin: Warm and dry with good turgor. No signs of peripheral arterial or venous insufficiency. No ulcerations. No open wounds. Medical Decision Making -Laboratory:   I have independently reviewed/ordered the following labs:    CBC with Differential:   Recent Labs     10/26/20  0523 10/27/20  0606   WBC 17.0* 17.4*   HGB 9.4* 9.6*   HCT 32.7* 31.5*   * See Reflexed IPF Result   LYMPHOPCT 10* 8*   MONOPCT 8 8     BMP:   Recent Labs     10/25/20  0836 10/26/20  0523    138   K 3.4* 3.5*    104   CO2 23 21   BUN 6 4*   CREATININE 0.31* 0.31*     Hepatic Function Panel:   No results for input(s): PROT, LABALBU, BILIDIR, IBILI, BILITOT, ALKPHOS, ALT, AST in the last 72 hours. No results for input(s): RPR in the last 72 hours. No results for input(s): HIV in the last 72 hours. No results for input(s): BC in the last 72 hours.   Lab Results   Component Value Date    MUCUS NOT REPORTED 10/24/2020    RBC 3.80 10/27/2020    TRICHOMONAS NOT REPORTED 10/24/2020    WBC 17.4 10/27/2020    YEAST NOT REPORTED 10/24/2020    TURBIDITY TURBID 10/24/2020     Lab Results   Component Value Date    CREATININE 0.31 10/26/2020    GLUCOSE 126 10/26/2020       Medical Decision Making-Imaging:     MRI OF THE BRAIN WITHOUT CONTRAST  10/22/2020 1:06 pm         TECHNIQUE:    Multiplanar multisequence MRI of the brain was performed without the    administration of intravenous contrast.         COMPARISON:    None.         HISTORY:    ORDERING SYSTEM PROVIDED HISTORY: new onset seizures, ovarian CA with diffuse    mets    TECHNOLOGIST PROVIDED HISTORY:    Pt given multiple doses of meds. Keeps moving. Best study possible at this    time. new onset seizures, ovarian CA with diffuse mets         FINDINGS:    INTRACRANIAL STRUCTURES/VENTRICLES: There is restricted diffusion identified    within the cortex of both occipital lobes and left parietal lobe compatible    with acute ischemia.  Small additional punctate infarct is identified within    the left frontal lobe near the convexity.         High FLAIR signal abnormality is identified within the cortices of both    frontal lobes and posterior left temporal lobe which could represent subacute    ischemia.  Mild chronic microvascular disease is noted within the    periventricular white matter.         No mass effect or midline shift. No evidence of an acute intracranial    hemorrhage.  The ventricles and sulci are normal in size and configuration.     The sellar/suprasellar regions appear unremarkable.  The normal signal voids    within the major intracranial vessels appear maintained.         ORBITS: The visualized portion of the orbits demonstrate no acute abnormality.         SINUSES: The visualized paranasal sinuses and mastoid air cells are well    aerated.         BONES/SOFT TISSUES: The bone marrow signal intensity appears normal. The soft    tissues demonstrate no acute abnormality.              Impression    Limited MRI of the brain demonstrates moderate acute ischemia involving both    occipital lobes and left parietal lobe involving the cortices.         Small acute punctate infarct within the posterior left frontal lobe near the    convexity.         Hyperintense FLAIR signal abnormality within the cortices of both frontal    lobes and posterior left temporal lobe may represent subacute ischemia or    inflammatory process such as vasculitis.  Posterior reversible    leukoencephalopathy is a possibility. Medical Decision Ozklvs-[id number]-Mkbma:     Results for Ayaka Montes (MRN [id number]) as of 10/23/2020 21:07   Ref. Range 10/22/2020 16:05   Albumin Index Latest Ref Range: <9.0  39.1 (H)   Albumin, CSF Latest Ref Range: 70 - 350 mg/L 129   Appearance, CSF Unknown CLEAR   Glucose, CSF Latest Ref Range: 40 - 70 mg/dL 78 (H)   IgG Index, CSF Latest Ref Range: <0.70  0.59   IgG Synthesis Rate, CSF Latest Ref Range: <3.3 mg/24 h < 3.3   IgG, CSF Latest Ref Range: 0.5 - 7.0 mg/dL 1.7   Oligo Bands Unknown Oligoclonal bands are performed at [facility] using isoelectric focusing and immunofixation. OLIGOCLONAL BANDING Unknown Rpt (A)   Protein, CSF Latest Ref Range: 15.0 - 45.0 mg/dL 25.4   Supernatant Color, CSF Unknown NOT REPORTED   Volume, CSF Unknown 5   RBC, CSF Latest Ref Range: 0 /mm3 0   WBC, CSF Latest Ref Range: <5 /mm3 0   Tube Number, CSF Unknown 3     Results for Ayaka Montes (MRN [id number]) as of 10/23/2020 21:07   Ref. Range 10/5/2020 10:46    Latest Ref Range: <38 U/mL 223 (H)     Results for Ayaka Montes (MRN [id number]) as of 10/23/2020 21:07   Ref.  Range 10/23/2020 01:26   HAEMOPHILUS INFLUENZA CSF FILM ARRAY Latest Ref Range: Not Detected  Not Detected   HHV-6 (HERPESVIRUS 6) CSF FILM ARRAY Latest Ref Range: Not Detected  Not Detected   HSV-1 CSF FILM ARRAY Latest Ref Range: Not Detected  Not Detected   HSV-2 CSF FILM ARRAY Latest Ref Range: Not Detected  Not Detected   PARECHOVIRUS CSF FILM ARRAY Latest Ref Range: Not Detected  Not Detected   ESCHERICHIA COLI K1 CSF FILM ARRAY Latest Ref Range: Not Detected  Not Detected   LISTERIA MONOCYTOGENES CSF FILM ARRAY Latest Ref Range: Not Detected  Not Detected   NEISSERIA MENIGITIDIS CSF FILM ARRAY Latest Ref Range: Not Detected  Not Detected STREPTOCOCCUS AGALACTIAE CSF FILM ARRAY Latest Ref Range: Not Detected  Not Detected   STREPTOCOCCUS PNEUMONIAE CSF FILM ARRAY Latest Ref Range: Not Detected  Not Detected   CRYPTOCOCCUS NEOFORMANS/CARMEN CSF FILM ARR. Latest Ref Range: Not Detected  Not Detected       Medical Decision Making-Other:     Note:  · Labs, medications, radiologic studies were reviewed with personal review of films  · Large amounts of data were reviewed  · Discussed with nursing Staff, Discharge planner  · Infection Control and Prevention measures reviewed  · All prior entries were reviewed  · Administer medications as ordered  · Prognosis: Fair  · Discharge planning reviewed  · Follow up as outpatient. Thank you for allowing us to participate in the care of this patient. Please call with questions. [address]:    I have discussed the case, including pertinent history and exam findings with the residents and students. I have seen and examined the patient and the key elements of the encounter have been performed by me. I was present when the student obtained his information or examined the patient. I have reviewed the laboratory data, other diagnostic studies and discussed them with the residents. I have updated the medical record where necessary. I agree with the assessment, plan and orders as documented by the resident/ student.     Nivia Sears MD.        Pager: [phone] - Office: [phone]

## 2020-10-27 NOTE — PROGRESS NOTES
Rosanne Peterson requires a bedside commode due to being confined to a single room, and is physically incapable of utilizing regular toilet facilities. Current body weight is Weight: 192 lb 10.9 oz (87.4 kg).

## 2020-10-27 NOTE — PROGRESS NOTES
Pavel Pinon requires a shower chair due to being confined to a single room, and is physically incapable of utilizing regular shower facilities.   Current body weight is Weight: 192 lb 10.9 oz (87.4 kg)

## 2020-10-27 NOTE — PROGRESS NOTES
Liliane Martel was evaluated today and a DME order was entered for a wheeled walker because she requires this to successfully complete daily living tasks of bathing, toileting, personal cares, ambulating, grooming and hygiene. A wheeled walker is necessary due to the patient's unsteady gait, upper body weakness, and inability to  an ambulation device; and she can ambulate only by pushing a walker instead of a lesser assistive device such as a cane, crutch, or standard walker. The need for this equipment was discussed with the patient and she understands and is in agreement.

## 2020-10-28 LAB
ABSOLUTE EOS #: 0.28 K/UL (ref 0–0.44)
ABSOLUTE IMMATURE GRANULOCYTE: 0 K/UL (ref 0–0.3)
ABSOLUTE LYMPH #: 1.26 K/UL (ref 1.1–3.7)
ABSOLUTE MONO #: 1.26 K/UL (ref 0.1–1.2)
ANION GAP SERPL CALCULATED.3IONS-SCNC: 14 MMOL/L (ref 9–17)
BASOPHILS # BLD: 1 % (ref 0–2)
BASOPHILS ABSOLUTE: 0.14 K/UL (ref 0–0.2)
BUN BLDV-MCNC: 7 MG/DL (ref 6–20)
BUN/CREAT BLD: ABNORMAL (ref 9–20)
CALCIUM SERPL-MCNC: 9.2 MG/DL (ref 8.6–10.4)
CHLORIDE BLD-SCNC: 105 MMOL/L (ref 98–107)
CO2: 21 MMOL/L (ref 20–31)
CREAT SERPL-MCNC: 0.26 MG/DL (ref 0.5–0.9)
DIFFERENTIAL TYPE: ABNORMAL
EOSINOPHILS RELATIVE PERCENT: 2 % (ref 1–4)
GFR AFRICAN AMERICAN: >60 ML/MIN
GFR NON-AFRICAN AMERICAN: >60 ML/MIN
GFR SERPL CREATININE-BSD FRML MDRD: ABNORMAL ML/MIN/{1.73_M2}
GFR SERPL CREATININE-BSD FRML MDRD: ABNORMAL ML/MIN/{1.73_M2}
GLUCOSE BLD-MCNC: 128 MG/DL (ref 70–99)
HCT VFR BLD CALC: 33 % (ref 36.3–47.1)
HEMOGLOBIN: 9.3 G/DL (ref 11.9–15.1)
IMMATURE GRANULOCYTES: 0 %
LYMPHOCYTES # BLD: 9 % (ref 24–43)
MCH RBC QN AUTO: 24.5 PG (ref 25.2–33.5)
MCHC RBC AUTO-ENTMCNC: 28.2 G/DL (ref 28.4–34.8)
MCV RBC AUTO: 86.8 FL (ref 82.6–102.9)
MONOCYTES # BLD: 9 % (ref 3–12)
MORPHOLOGY: ABNORMAL
NRBC AUTOMATED: 0 PER 100 WBC
PDW BLD-RTO: 21.2 % (ref 11.8–14.4)
PLATELET # BLD: ABNORMAL K/UL (ref 138–453)
PLATELET ESTIMATE: ABNORMAL
PLATELET, FLUORESCENCE: 1073 K/UL (ref 138–453)
PLATELET, IMMATURE FRACTION: 1.3 % (ref 1.1–10.3)
PMV BLD AUTO: ABNORMAL FL (ref 8.1–13.5)
POTASSIUM SERPL-SCNC: 3.6 MMOL/L (ref 3.7–5.3)
RBC # BLD: 3.8 M/UL (ref 3.95–5.11)
RBC # BLD: ABNORMAL 10*6/UL
SEG NEUTROPHILS: 79 % (ref 36–65)
SEGMENTED NEUTROPHILS ABSOLUTE COUNT: 11.06 K/UL (ref 1.5–8.1)
SODIUM BLD-SCNC: 140 MMOL/L (ref 135–144)
WBC # BLD: 14 K/UL (ref 3.5–11.3)
WBC # BLD: ABNORMAL 10*3/UL

## 2020-10-28 PROCEDURE — 6360000002 HC RX W HCPCS: Performed by: NURSE PRACTITIONER

## 2020-10-28 PROCEDURE — 2580000003 HC RX 258: Performed by: INTERNAL MEDICINE

## 2020-10-28 PROCEDURE — 2060000000 HC ICU INTERMEDIATE R&B

## 2020-10-28 PROCEDURE — 85025 COMPLETE CBC W/AUTO DIFF WBC: CPT

## 2020-10-28 PROCEDURE — 6370000000 HC RX 637 (ALT 250 FOR IP): Performed by: NURSE PRACTITIONER

## 2020-10-28 PROCEDURE — 80048 BASIC METABOLIC PNL TOTAL CA: CPT

## 2020-10-28 PROCEDURE — 6360000002 HC RX W HCPCS: Performed by: INTERNAL MEDICINE

## 2020-10-28 PROCEDURE — 85055 RETICULATED PLATELET ASSAY: CPT

## 2020-10-28 PROCEDURE — 99232 SBSQ HOSP IP/OBS MODERATE 35: CPT | Performed by: INTERNAL MEDICINE

## 2020-10-28 PROCEDURE — 99232 SBSQ HOSP IP/OBS MODERATE 35: CPT | Performed by: PSYCHIATRY & NEUROLOGY

## 2020-10-28 PROCEDURE — 6370000000 HC RX 637 (ALT 250 FOR IP): Performed by: INTERNAL MEDICINE

## 2020-10-28 PROCEDURE — 6370000000 HC RX 637 (ALT 250 FOR IP): Performed by: FAMILY MEDICINE

## 2020-10-28 PROCEDURE — 36415 COLL VENOUS BLD VENIPUNCTURE: CPT

## 2020-10-28 PROCEDURE — 6370000000 HC RX 637 (ALT 250 FOR IP): Performed by: PHYSICIAN ASSISTANT

## 2020-10-28 PROCEDURE — 2580000003 HC RX 258: Performed by: NURSE PRACTITIONER

## 2020-10-28 RX ORDER — LACOSAMIDE 200 MG/1
200 TABLET ORAL 2 TIMES DAILY
Qty: 60 TABLET | Refills: 2 | Status: SHIPPED | OUTPATIENT
Start: 2020-10-28 | End: 2020-10-28

## 2020-10-28 RX ORDER — LEVETIRACETAM 750 MG/1
1500 TABLET ORAL 2 TIMES DAILY
Qty: 120 TABLET | Refills: 3 | Status: SHIPPED | OUTPATIENT
Start: 2020-10-28 | End: 2020-10-28

## 2020-10-28 RX ORDER — LEVETIRACETAM 750 MG/1
1500 TABLET ORAL 2 TIMES DAILY
Qty: 120 TABLET | Refills: 2 | Status: SHIPPED | OUTPATIENT
Start: 2020-10-28 | End: 2021-03-26 | Stop reason: SDUPTHER

## 2020-10-28 RX ORDER — AMLODIPINE BESYLATE 10 MG/1
10 TABLET ORAL DAILY
Qty: 30 TABLET | Refills: 3 | Status: ON HOLD | OUTPATIENT
Start: 2020-10-29 | End: 2021-01-13 | Stop reason: HOSPADM

## 2020-10-28 RX ORDER — LACOSAMIDE 200 MG/1
200 TABLET ORAL 2 TIMES DAILY
Qty: 60 TABLET | Refills: 2 | Status: SHIPPED | OUTPATIENT
Start: 2020-10-28 | End: 2020-10-29 | Stop reason: CLARIF

## 2020-10-28 RX ORDER — POLYETHYLENE GLYCOL 3350 17 G/17G
17 POWDER, FOR SOLUTION ORAL DAILY PRN
Qty: 527 G | Refills: 1 | Status: SHIPPED | OUTPATIENT
Start: 2020-10-28 | End: 2020-11-27

## 2020-10-28 RX ORDER — LORAZEPAM 0.5 MG/1
0.5 TABLET ORAL ONCE
Status: COMPLETED | OUTPATIENT
Start: 2020-10-28 | End: 2020-10-28

## 2020-10-28 RX ADMIN — LEVETIRACETAM 1500 MG: 500 TABLET, FILM COATED ORAL at 09:24

## 2020-10-28 RX ADMIN — LORAZEPAM 0.5 MG: 0.5 TABLET ORAL at 21:14

## 2020-10-28 RX ADMIN — SODIUM CHLORIDE 200 MG: 9 INJECTION, SOLUTION INTRAVENOUS at 14:08

## 2020-10-28 RX ADMIN — SODIUM CHLORIDE, PRESERVATIVE FREE 10 ML: 5 INJECTION INTRAVENOUS at 21:18

## 2020-10-28 RX ADMIN — LEVETIRACETAM 1500 MG: 500 TABLET, FILM COATED ORAL at 21:17

## 2020-10-28 RX ADMIN — ACETAMINOPHEN 650 MG: 325 TABLET ORAL at 16:47

## 2020-10-28 RX ADMIN — ACETAMINOPHEN 650 MG: 325 TABLET ORAL at 01:44

## 2020-10-28 RX ADMIN — AMLODIPINE BESYLATE 10 MG: 10 TABLET ORAL at 09:24

## 2020-10-28 RX ADMIN — ENOXAPARIN SODIUM 40 MG: 40 INJECTION SUBCUTANEOUS at 09:24

## 2020-10-28 RX ADMIN — ACETAMINOPHEN 650 MG: 325 TABLET ORAL at 21:15

## 2020-10-28 RX ADMIN — SODIUM CHLORIDE, PRESERVATIVE FREE 10 ML: 5 INJECTION INTRAVENOUS at 09:24

## 2020-10-28 RX ADMIN — LACOSAMIDE 200 MG: 100 TABLET, FILM COATED ORAL at 21:16

## 2020-10-28 RX ADMIN — LACOSAMIDE 200 MG: 100 TABLET, FILM COATED ORAL at 09:24

## 2020-10-28 ASSESSMENT — PAIN SCALES - GENERAL
PAINLEVEL_OUTOF10: 0
PAINLEVEL_OUTOF10: 0
PAINLEVEL_OUTOF10: 3
PAINLEVEL_OUTOF10: 5
PAINLEVEL_OUTOF10: 5

## 2020-10-28 ASSESSMENT — PAIN SCALES - WONG BAKER
WONGBAKER_NUMERICALRESPONSE: 6

## 2020-10-28 NOTE — DISCHARGE INSTR - COC
Continuity of Care Form    Patient Name: Indu Jordan   :  1965  MRN:  [id number]    Admit date:  10/22/2020  Discharge date:  10/28/2020    Code Status Order: Full Code   Advance Directives:   [facility] Documentation       Date/Time Healthcare Directive Type of Healthcare Directive Copy in [facility] [address] Agent's Name Healthcare Agent's Phone Number    10/22/20 0035  No, patient does not have an advance directive for healthcare treatment -- -- -- -- --            Admitting Physician:  Lamont Brush MD  PCP: No primary care provider on file. Discharging Nurse: Diana Rajan Unit/Room#: 8079/0574-81  Discharging Unit Phone Number: [phone]    Emergency Contact:   Extended Emergency Contact Information  Primary Emergency Contact: Augusto Valentine  Address: n/a  Home Phone: [phone]  Work Phone: [phone]  Mobile Phone: [phone]  Relation: Child  Secondary Emergency Contact: Yovani Rodriguez  Address: [address] [place] [address], [address] [place] [address]  Home Phone: [phone]  Relation: Child    Past Surgical History:  Past Surgical History:   Procedure Laterality Date    ABSCESS DRAINAGE      Franca rectal    ANUS SURGERY      ANAL FISSURECTOMY    COLECTOMY  2013    ex lap, tumor debulking, transverse colectomy w reanastamosis, subgastric omentectomy, intraperitoneal port placement    HYSTERECTOMY, TOTAL ABDOMINAL      IR EMBOLIZATION HEMORRHAGE  10/05/2020    intra-abdominal bleeding -due to splenic mass with GI infiltration. Status post embolization [employer] interlock coils x7. mri condtional 3t ok, safe immediately post implant.  IR PORT PLACEMENT EQUAL OR GREATER THAN 5 YEARS  2020    IR PORT PLACEMENT EQUAL OR GREATER THAN 5 YEARS 2020 Delicia Conde MD [facility]ED[facility]    PORT SURGERY      IP Port    TONSILLECTOMY         Immunization History:      There is no immunization history on file for

## 2020-10-28 NOTE — PLAN OF CARE
Problem: Skin Integrity:  Goal: Will show no infection signs and symptoms  Description: Will show no infection signs and symptoms  10/28/2020 1247 by Lana Marion RN  Outcome: Met This Shift  10/28/2020 0512 by Twan Escamilla RN  Outcome: Ongoing  Goal: Absence of new skin breakdown  Description: Absence of new skin breakdown  10/28/2020 1247 by Lana Marion RN  Outcome: Met This Shift  10/28/2020 0512 by Twan Escamilla RN  Outcome: Met This Shift     Problem: Falls - Risk of:  Goal: Will remain free from falls  Description: Will remain free from falls  10/28/2020 1247 by Lana Marion RN  Outcome: Met This Shift  10/28/2020 0512 by Twan Escamilla RN  Outcome: Met This Shift  Goal: Absence of physical injury  Description: Absence of physical injury  10/28/2020 1247 by Lana Marion RN  Outcome: Met This Shift  10/28/2020 0512 by Twan Escamilla RN  Outcome: Met This Shift     Problem: Coping:  Goal: Ability to cope will improve  Description: Ability to cope will improve  Outcome: Met This Shift  Goal: Ability to identify appropriate support needs will improve  Description: Ability to identify appropriate support needs will improve  Outcome: Met This Shift     Problem: Health Behavior:  Goal: Ability to manage health-related needs will improve  Description: Ability to manage health-related needs will improve  Outcome: Met This Shift     Problem: Physical Regulation:  Goal: Signs of adequate cerebral perfusion will increase  Description: Signs of adequate cerebral perfusion will increase  Outcome: Met This Shift  Goal: Ability to maintain a stable neurologic state will improve  Description: Ability to maintain a stable neurologic state will improve  Outcome: Met This Shift     Problem: Safety:  Goal: Ability to remain free from injury will improve  Description: Ability to remain free from injury will improve  Outcome: Met This Shift     Problem: Self-Concept:  Goal: Level of anxiety will

## 2020-10-28 NOTE — PLAN OF CARE
Problem: Skin Integrity:  Goal: Will show no infection signs and symptoms  Description: Will show no infection signs and symptoms  10/28/2020 1247 by Lizet Cardenas RN  Outcome: Completed  10/28/2020 1247 by Lizet Cardenas RN  Outcome: Met This Shift  10/28/2020 0512 by Eduardo Miller RN  Outcome: Ongoing  Goal: Absence of new skin breakdown  Description: Absence of new skin breakdown  10/28/2020 1247 by Lizet Cardenas RN  Outcome: Completed  10/28/2020 1247 by Lizet Cardenas RN  Outcome: Met This Shift  10/28/2020 0512 by Eduardo Miller RN  Outcome: Met This Shift     Problem: Falls - Risk of:  Goal: Will remain free from falls  Description: Will remain free from falls  10/28/2020 1247 by Lizet Cardenas RN  Outcome: Completed  10/28/2020 1247 by Lizet Cardenas RN  Outcome: Met This Shift  10/28/2020 0512 by Eduardo Miller RN  Outcome: Met This Shift  Goal: Absence of physical injury  Description: Absence of physical injury  10/28/2020 1247 by Lizet Cardenas RN  Outcome: Completed  10/28/2020 1247 by Lizet Cardenas RN  Outcome: Met This Shift  10/28/2020 0512 by Eduardo Miller RN  Outcome: Met This Shift     Problem: Coping:  Goal: Ability to cope will improve  Description: Ability to cope will improve  10/28/2020 1247 by Lizet Cardenas RN  Outcome: Completed  10/28/2020 1247 by Lizet Cardenas RN  Outcome: Met This Shift  Goal: Ability to identify appropriate support needs will improve  Description: Ability to identify appropriate support needs will improve  10/28/2020 1247 by Lizet Cardenas RN  Outcome: Completed  10/28/2020 1247 by Lizet Cardenas RN  Outcome: Met This Shift     Problem: Health Behavior:  Goal: Ability to manage health-related needs will improve  Description: Ability to manage health-related needs will improve  10/28/2020 1247 by Lizet Cardenas RN  Outcome: Completed  10/28/2020 1247 by Lizet Cardenas RN  Outcome: Met This Shift     Problem: Physical Regulation:  Goal: Signs of adequate cerebral perfusion will increase  Description: Signs of adequate cerebral perfusion will increase  10/28/2020 1247 by Trang Wills RN  Outcome: Completed  10/28/2020 1247 by Trang Wills RN  Outcome: Met This Shift  Goal: Ability to maintain a stable neurologic state will improve  Description: Ability to maintain a stable neurologic state will improve  10/28/2020 1247 by Trang Wills RN  Outcome: Completed  10/28/2020 1247 by Trang Wills RN  Outcome: Met This Shift     Problem: Safety:  Goal: Ability to remain free from injury will improve  Description: Ability to remain free from injury will improve  10/28/2020 1247 by Trang Wills RN  Outcome: Completed  10/28/2020 1247 by Trang Wills RN  Outcome: Met This Shift     Problem: Self-Concept:  Goal: Level of anxiety will decrease  Description: Level of anxiety will decrease  10/28/2020 1247 by Trang Wills RN  Outcome: Completed  10/28/2020 1247 by Trang Wills RN  Outcome: Met This Shift  Goal: Ability to verbalize feelings about condition will improve  Description: Ability to verbalize feelings about condition will improve  10/28/2020 1247 by Trang Wills RN  Outcome: Completed  10/28/2020 1247 by Trang Wills RN  Outcome: Met This Shift     Problem: Musculor/Skeletal Functional Status  Goal: Highest potential functional level  10/28/2020 1247 by Trang Wills RN  Outcome: Completed  10/28/2020 1247 by Trang Wills RN  Outcome: Met This Shift  Goal: Absence of falls  10/28/2020 1247 by Trang Wills RN  Outcome: Completed  10/28/2020 1247 by Trang Wills RN  Outcome: Met This Shift     Problem: Pain:  Goal: Pain level will decrease  Description: Pain level will decrease  10/28/2020 1247 by Trang Wills RN  Outcome: Completed  10/28/2020 1247 by Trang Wills RN  Outcome: Met This Shift  Goal: Control of acute pain  Description: Control of acute pain  10/28/2020 1247 by Satya Damon RN  Outcome: Completed  10/28/2020 1247 by Satya Damon RN  Outcome: Met This Shift  Goal: Control of chronic pain  Description: Control of chronic pain  10/28/2020 1247 by Satya Damon RN  Outcome: Completed  10/28/2020 1247 by Satya Damon RN  Outcome: Met This Shift

## 2020-10-28 NOTE — PROGRESS NOTES
[facility] - Progress Note  Today's Date and Time: 10/28/2020, 8:14 AM    Impression :   · New onset seizure  · Metabolic encephalopathy  · Hyponatremia  · Diabetes mellitus type 2  · Anemia  · Splenic lesion s/p IR embolization   · Ovarian cancer (2005) with multiple recurrences   · Metastatic ovarian cancer to abdomen, liver, lungs, spleen  · Leukocytosis   · No bacterial meningitis or viral encephalitis  · Post-LP headache    Recommendations:   · Monitor off antibiotics  · Acyclovir discontinued    Medical Decision Making/Summary/Discussion:10/28/2020     · Patient with metastatic ovarian carcinoma  · Admitted post seizure  · Hyponatremic at that time. Hyponatremia since corrected  · Concern raised for infection because of leucocytosis, abnormalities in MRI. · The latter are probably vascular in nature, suspecting PRES   · CSF and clinical exam do not support bacterial meningitis or viral/parasitic encephalitis. · Will Monitor off antibiotics   · Frontal headache consistent with Post-LP headache    Infection Control Recommendations   · [place] Precautions    Antimicrobial Stewardship Recommendations   · Discontinuation of therapy  ·   Coordination of Outpatient Care:   · Estimated Length of IV antimicrobials: 10-23-20  · Patient will need Midline Catheter Insertion: no  · Patient will need PICC line Insertion: no  · Patient will need: Home IV , [place],  SNF,  LTAC: TBD  · Patient will need outpatient wound care: No    Chief complaint/reason for consultation:   · Fever, leukocytosis     History of Present Illness:   Alexandra Perez is a 60y.o.-year-old  female who was initially admitted on 10/22/2020. Patient seen at the request of Dr. Morris Alex. INITIAL HISTORY:   Patient presented to [facility] with concerns of confusion after receiving influenza and pneumonia pneumococcal vaccines.       Per nursing report patient had an episode of a seizure in the emergency room at [facility]. At that time she was loaded with Keppra and given Ativan. She also was started on acyclovir, vancomycin, ceftriaxone and transferred to [facility] for further evaluation. Upon arrival to [place] [address] she was difficult to arouse but responsive to noxious stimuli. Subsequently when she was examined on the Neurology unit she was alert, cogent, followed commands, was able to move all extremities and bend her neck to touch her chest without any problems. Ovarian carcinoma history:  Patient has history of ovarian cancer with multiple recurrences. 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 She underwent surgical debulking and systemic treatment with Taxol and carboplatin for 6 cycles. Patient was in remission for about 2 years. She had a relapse in 2007 and was treated with topotecan with good results. Patient relapsed again in 2008 and was treated with Taxol carboplatin. After 3 cycles of systemic chemotherapy she had problems with carboplatin so she finished 3 more cycles with Taxol. She did well again for 2 to 3 years until she had a relapse in 2012 and she had intraperitoneal chemotherapy treatment with Taxol. Patient was last seen by her oncologist in 2014 at which time she had relatively stable disease with normal tumor marker and no significant abnormal images. Patient moved to [place] after that and she was lost for oncology follow-ups. Patient was recently evaluated again here in [facility] because of abdominal discomfort. Re-imaging confirmed metastatic relapse. Biopsy confirmed ovarian cancer recurrence. Scan showed extensive intraabdominal and splenic involvement and lung mets. Patient was receiving therapy with Doxil/Avastini as of 9-.      Recent Hospitalization:  Patient seen at [place] [address] for an upper GI bleed.  She underwent embolization with interventional radiology for intra-abdominal bleeding due to a splenic mass with GI infiltration. She received approximately 5 units of blood for marked anemia. CURRENT EVALUATION: 10/28/2020    Afebrile   VS stable     Patient seen and examined at bedside this morning. No acute overnight events. Feels better with each passing day. Headache improved from yesterday. Coordination and blurry vision improving. Denies fevers, chills, nausea, vomiting. Leukocytosis continues to trend down. All cultures remain negative, no suspicion for meningitis. Neurology continues to evaluate on LTME. Labs, X rays reviewed: 10/28/2020    BUN: 4-->6-->4-->6-->7  Cr: 0.4-->0.31-->0.31-->0.34-->0.26    WBC: 24.2-->22-->17.8-->17-->17.4-->14   Hb: 10-->9.8-->9.5-->9.4-->9.6-->9.3  Plat: 691-->741-->887-->907    Cultures:  Urine:  ·   Blood:  · 10-23-20: no growth   Sputum :  ·   Wound:  ·   CSF:  · 10-23-20:  No growth   · Molecular/PCR of CSF 10-23-20: negative     Cryptococcal Antigen, CSF negative  Urinalysis 10-24-20:  Negative        Discussed with patient, RN, daughter and son (over the phone). I have personally reviewed the past medical history, past surgical history, medications, social history, and family history, and I have updated the database accordingly. Past Medical History:     Past Medical History:   Diagnosis Date    Anemia     Bleeding 10/2020    intra-abdominal bleeding -due to splenic mass with GI infiltration. Status post embolization    [facility] [address]     Depression     Diabetes mellitus [facility] [address]     GERD (gastroesophageal reflux disease)     Metastatic cancer ([facility] [address] 10/2020    extensive intraabdominal and splenic involvement and lung mets.     Ovarian cancer ([facility] [address]     low grade serous ovarian carcinoma    Post chemo evaluation     2007: Chemo via med onc (Dr. Benja Canela), 2008: Edel Gory due to rising CA-125, 2013: intraperitoneal chemo,12/2015: Ca125 - 25     Splenic lesion        Past Surgical  History: Past Surgical History:   Procedure Laterality Date    ABSCESS DRAINAGE  2013    Franca rectal    ANUS SURGERY      ANAL FISSURECTOMY    COLECTOMY  03/2013    ex lap, tumor debulking, transverse colectomy w reanastamosis, subgastric omentectomy, intraperitoneal port placement    HYSTERECTOMY, TOTAL ABDOMINAL      IR EMBOLIZATION HEMORRHAGE  10/05/2020    intra-abdominal bleeding -due to splenic mass with GI infiltration. Status post embolization [employer] interlock coils x7. mri condtional 3t ok, safe immediately post implant.     IR PORT PLACEMENT EQUAL OR GREATER THAN 5 YEARS  8/24/2020    IR PORT PLACEMENT EQUAL OR GREATER THAN 5 YEARS 8/24/2020 Sandra Colon MD [facility]    PORT SURGERY      IP Port    TONSILLECTOMY         Medications:      iron sucrose  200 mg Intravenous Q24H    amLODIPine  10 mg Oral Daily    levETIRAcetam  1,500 mg Oral BID    lacosamide  200 mg Oral BID    LORazepam  2 mg Intravenous Once    enoxaparin  40 mg Subcutaneous Daily    sodium chloride flush  10 mL Intravenous 2 times per day    lidocaine PF  5 mL Intradermal Once       Social History:     Social History     Socioeconomic History    Marital status: Single     Spouse name: Not on file    Number of children: Not on file    Years of education: Not on file    Highest education level: Not on file   Occupational History    Not on file   Social Needs    Financial resource strain: Not on file    Food insecurity     Worry: Not on file     Inability: Not on file    Transportation needs     Medical: Not on file     Non-medical: Not on file   Tobacco Use    Smoking status: Current Every Day Smoker     Packs/day: 1.00    Smokeless tobacco: Current User   Substance and Sexual Activity    Alcohol use: Not Currently    Drug use: Never    Sexual activity: Not on file   Lifestyle    Physical activity     Days per week: Not on file     Minutes per session: Not on file    Stress: Not on file Relationships    Social connections     Talks on phone: Not on file     Gets together: Not on file     Attends Quaker service: Not on file     Active member of club or organization: Not on file     Attends meetings of clubs or organizations: Not on file     Relationship status: Not on file    Intimate partner violence     Fear of current or ex partner: Not on file     Emotionally abused: Not on file     Physically abused: Not on file     Forced sexual activity: Not on file   Other Topics Concern    Not on file   Social History Narrative    Not on file       Family History:     Family History   Problem Relation Age of Onset    Alcohol Abuse Mother     Cirrhosis Mother         Allergies:   Patient has no known allergies. Review of Systems:   Constitutional: No fevers or chills. No systemic complaints  Head: No headaches  Eyes: No double vision or blurry vision. No conjunctival inflammation. ENT: No sore throat or runny nose. No hearing loss, tinnitus or vertigo. Cardiovascular: No chest pain or palpitations. No shortness of breath. No CONTRERAS  Lung: No shortness of breath or cough. No sputum production  Abdomen: No nausea, vomiting, diarrhea, or abdominal pain. No cramps. Genitourinary: No increased urinary frequency, or dysuria. No hematuria. No suprapubic or CVA pain  Musculoskeletal: No muscle aches or pains. No joint effusions, swelling or deformities  Hematologic: No bleeding or bruising. Neurologic: Frontal headache improved from yesterday. No weakness, numbness, or tingling. Seizure x 1  Integument: No rash, no ulcers. Psychiatric: No depression. Endocrine: No polyuria, no polydipsia, no polyphagia.     Physical Examination :     Patient Vitals for the past 8 hrs:   BP Temp Temp src Resp   10/28/20 0746 137/66 98.2 °F (36.8 °C) Oral 19     General Appearance: Awake, alert, and in no apparent distress  Head:  Normocephalic, no trauma  Eyes: Pupils equal, round, reactive to light and accommodation; extraocular movements intact; sclera anicteric; conjunctivae pink. No embolic phenomena. ENT: Oropharynx clear, without erythema, exudate, or thrush. No tenderness of sinuses. Mouth/throat: mucosa pink and moist. No lesions. Dentition in good repair. Neck:Supple, without lymphadenopathy. Thyroid normal, No bruits. Pulmonary/Chest: Clear to auscultation, without wheezes, rales, or rhonchi. No dullness to percussion. Cardiovascular: Regular rate and rhythm without murmurs, rubs, or gallops. Abdomen: Soft, non tender. Bowel sounds normal. No organomegaly  All four Extremities: No cyanosis, clubbing, edema, or effusions. Neurologic: Able to move extremities, follows all commands  Skin: Warm and dry with good turgor. No signs of peripheral arterial or venous insufficiency. No ulcerations. No open wounds. Medical Decision Making -Laboratory:   I have independently reviewed/ordered the following labs:    CBC with Differential:   Recent Labs     10/27/20  0606 10/28/20  0442   WBC 17.4* 14.0*   HGB 9.6* 9.3*   HCT 31.5* 33.0*   PLT See Reflexed IPF Result See Reflexed IPF Result   LYMPHOPCT 8* 9*   MONOPCT 8 9     BMP:   Recent Labs     10/27/20  0817 10/28/20  0442    140   K 4.2 3.6*    105   CO2 23 21   BUN 6 7   CREATININE 0.34* 0.26*     Hepatic Function Panel:   No results for input(s): PROT, LABALBU, BILIDIR, IBILI, BILITOT, ALKPHOS, ALT, AST in the last 72 hours. No results for input(s): RPR in the last 72 hours. No results for input(s): HIV in the last 72 hours. No results for input(s): BC in the last 72 hours.   Lab Results   Component Value Date    MUCUS NOT REPORTED 10/24/2020    RBC 3.80 10/28/2020    TRICHOMONAS NOT REPORTED 10/24/2020    WBC 14.0 10/28/2020    YEAST NOT REPORTED 10/24/2020    TURBIDITY TURBID 10/24/2020     Lab Results   Component Value Date    CREATININE 0.26 10/28/2020    GLUCOSE 128 10/28/2020       Medical Decision Making-Imaging:     MRI OF THE FLAIR signal abnormality within the cortices of both frontal    lobes and posterior left temporal lobe may represent subacute ischemia or    inflammatory process such as vasculitis.  Posterior reversible    leukoencephalopathy is a possibility. Medical Decision Lunkrw-Qyruvolk-[id number]:     Results for Little Johnson (MRN [id number]) as of 10/23/2020 21:07   Ref. Range 10/22/2020 16:05   Albumin Index Latest Ref Range: <9.0  39.1 (H)   Albumin, CSF Latest Ref Range: 70 - 350 mg/L 129   Appearance, CSF Unknown CLEAR   Glucose, CSF Latest Ref Range: 40 - 70 mg/dL 78 (H)   IgG Index, CSF Latest Ref Range: <0.70  0.59   IgG Synthesis Rate, CSF Latest Ref Range: <3.3 mg/24 h < 3.3   IgG, CSF Latest Ref Range: 0.5 - 7.0 mg/dL 1.7   Oligo Bands Unknown Oligoclonal bands are performed at [facility] using isoelectric focusing and immunofixation. OLIGOCLONAL BANDING Unknown Rpt (A)   Protein, CSF Latest Ref Range: 15.0 - 45.0 mg/dL 25.4   Supernatant Color, CSF Unknown NOT REPORTED   Volume, CSF Unknown 5   RBC, CSF Latest Ref Range: 0 /mm3 0   WBC, CSF Latest Ref Range: <5 /mm3 0   Tube Number, CSF Unknown 3     Results for Little Johnson (MRN [id number]) as of 10/23/2020 21:07   Ref. Range 10/5/2020 10:46    Latest Ref Range: <38 U/mL 223 (H)     Results for Little Johnson (MRN [id number]) as of 10/23/2020 21:07   Ref.  Range 10/23/2020 01:26   HAEMOPHILUS INFLUENZA CSF FILM ARRAY Latest Ref Range: Not Detected  Not Detected   HHV-6 (HERPESVIRUS 6) CSF FILM ARRAY Latest Ref Range: Not Detected  Not Detected   HSV-1 CSF FILM ARRAY Latest Ref Range: Not Detected  Not Detected   HSV-2 CSF FILM ARRAY Latest Ref Range: Not Detected  Not Detected   PARECHOVIRUS CSF FILM ARRAY Latest Ref Range: Not Detected  Not Detected   ESCHERICHIA COLI K1 CSF FILM ARRAY Latest Ref Range: Not Detected  Not Detected   LISTERIA MONOCYTOGENES CSF FILM ARRAY Latest Ref Range: Not Detected  Not Detected   NEISSERIA MENIGITIDIS CSF FILM ARRAY Latest Ref Range: Not Detected  Not Detected   STREPTOCOCCUS AGALACTIAE CSF FILM ARRAY Latest Ref Range: Not Detected  Not Detected   STREPTOCOCCUS PNEUMONIAE CSF FILM ARRAY Latest Ref Range: Not Detected  Not Detected   CRYPTOCOCCUS NEOFORMANS/CARMEN CSF FILM ARR. Latest Ref Range: Not Detected  Not Detected       Medical Decision Making-Other:     Note:  · Labs, medications, radiologic studies were reviewed with personal review of films  · Large amounts of data were reviewed  · Discussed with nursing Staff, Discharge planner  · Infection Control and Prevention measures reviewed  · All prior entries were reviewed  · Administer medications as ordered  · Prognosis: Fair  · Discharge planning reviewed  · Follow up as outpatient. Thank you for allowing us to participate in the care of this patient. Please call with questions. [address]:    I have discussed the case, including pertinent history and exam findings with the residents and students. I have seen and examined the patient and the key elements of the encounter have been performed by me. I was present when the student obtained his information or examined the patient. I have reviewed the laboratory data, other diagnostic studies and discussed them with the residents. I have updated the medical record where necessary. I agree with the assessment, plan and orders as documented by the resident/ student.     Jaycob Rai MD.      Pager: [phone] - Office: [phone]

## 2020-10-28 NOTE — CARE COORDINATION
Received call from [address] needs prior auth. Submitted Key# [id number] to cover my meds for Vimpat.

## 2020-10-28 NOTE — PROGRESS NOTES
amLODIPine (NORVASC) 10 MG tablet Take 1 tablet by mouth daily 10/29/20  Yes Gui Escobedo MD   polyethylene glycol (GLYCOLAX) 17 g packet Take 17 g by mouth daily as needed for Constipation 10/28/20 11/27/20 Yes Gui Escobedo MD   lacosamide (VIMPAT) 200 MG tablet Take 1 tablet by mouth 2 times daily for 90 days. 10/28/20 1/26/21 Yes Julio Forrest MD   levETIRAcetam (KEPPRA) 750 MG tablet Take 2 tablets by mouth 2 times daily 10/28/20  Yes Julio Forrest MD   omeprazole (PRILOSEC) 20 MG delayed release capsule Take 20 mg by mouth daily   Yes Historical Provider, MD   HYDROcodone-acetaminophen (NORCO) 5-325 MG per tablet Take 1 tablet by mouth every 6 hours as needed for Pain. Yes Historical Provider, MD   oxyCODONE (OXYCONTIN) 10 MG extended release tablet Take 10 mg by mouth every 12 hours. Yes Historical Provider, MD   prochlorperazine (COMPAZINE) 10 MG tablet Take 10 mg by mouth every 6 hours as needed   Yes Historical Provider, MD   tiZANidine (ZANAFLEX) 4 MG tablet Take 4 mg by mouth every 6 hours as needed   Yes Historical Provider, MD   ibuprofen (ADVIL;MOTRIN) 200 MG tablet Take 200 mg by mouth every 6 hours as needed for Pain   Yes Historical Provider, MD   oxyCODONE-acetaminophen (PERCOCET)  MG per tablet Take 1-2 tablets by mouth every 4 hours.  10/12/20   Historical Provider, MD   pantoprazole (PROTONIX) 40 MG tablet Take 40 mg by mouth 2 times daily    Historical Provider, MD   metFORMIN (GLUCOPHAGE-XR) 500 MG extended release tablet Take 2 tablets by mouth 2 times daily 10/5/20   Kiarra Hoyos MD   ferrous sulfate (IRON 325) 325 (65 Fe) MG tablet Take 1 tablet by mouth daily (with breakfast)  Patient not taking: Reported on 10/16/2020 9/18/20   Kiarra Hoyos MD   aspirin 81 MG chewable tablet Take 81 mg by mouth nightly    Historical Provider, MD   simvastatin (ZOCOR) 40 MG tablet Take 40 mg by mouth nightly    Historical Provider, MD   sertraline (ZOLOFT) 100 MG  ondansetron (ZOFRAN) injection 4 mg  4 mg Intravenous Q6H PRN Becerril Lanes, APRN - CNP        sodium chloride flush 0.9 % injection 10 mL  10 mL Intravenous 2 times per day Becerriller Lanes, APRN - CNP   10 mL at 10/28/20 0924    sodium chloride flush 0.9 % injection 10 mL  10 mL Intravenous PRN Becerril Lanes, APRN - CNP        lidocaine PF 2 % injection 5 mL  5 mL Intradermal Once Martinez Hilliard MD        acetaminophen (TYLENOL) tablet 650 mg  650 mg Oral Q4H PRN Baldwin Gum [employer], PA   650 mg at 10/28/20 1647       Allergies:  Patient has no known allergies. Social History:   reports that she has been smoking. She has been smoking about 1.00 pack per day. She uses smokeless tobacco. She reports previous alcohol use. She reports that she does not use drugs. Family History: family history includes Alcohol Abuse in her mother; Cirrhosis in her mother. REVIEW OF SYSTEMS:    Constitutional: No fever or chills. No night sweats, no weight loss   Eyes: No eye discharge, double vision, or eye pain   HEENT: negative for sore mouth, sore throat, hoarseness and voice change   Respiratory: negative for cough , sputum, dyspnea, wheezing, hemoptysis, chest pain   Cardiovascular: negative for chest pain, dyspnea, palpitations, orthopnea, PND   Gastrointestinal: negative for nausea, vomiting, diarrhea, constipation, abdominal pain, Dysphagia, hematemesis and hematochezia   Genitourinary: negative for frequency, dysuria, nocturia, urinary incontinence, and hematuria   Integument: negative for rash, skin lesions, bruises.    Hematologic/Lymphatic: negative for easy bruising, bleeding, lymphadenopathy, or petechiae   Endocrine: negative for heat or cold intolerance,weight changes, change in bowel habits and hair loss   Musculoskeletal: negative for myalgias, arthralgias, pain, joint swelling,and bone pain   Neurological: negative for headaches, dizziness, seizures, weakness, numbness    PHYSICAL EXAM:      /79   Pulse 83   Temp 97.3 °F (36.3 °C) (Oral)   Resp 18   Ht 5' 6\" (1.676 m)   Wt 192 lb 10.9 oz (87.4 kg)   SpO2 97%   BMI 31.10 kg/m²    Temp (24hrs), Av.6 °F (36.4 °C), Min:97.2 °F (36.2 °C), Max:98.2 °F (36.8 °C)    General appearance - well appearing, no in pain or distress   Eyes - pupils equal and reactive, extraocular eye movements intact   Ears - bilateral TM's and external ear canals normal   Mouth - mucous membranes moist, pharynx normal without lesions   Neck - supple, no significant adenopathy   Lymphatics - no palpable lymphadenopathy, no hepatosplenomegaly   Chest - clear to auscultation, no wheezes, rales or rhonchi, symmetric air entry   Heart - normal rate, regular rhythm, normal S1, S2, no murmurs  Abdomen - soft, nontender, nondistended, no masses or organomegaly   Neurological -awake slightly confused,, normal speech, no focal findings or movement disorder noted   Musculoskeletal - no joint tenderness, deformity or swelling   Extremities - peripheral pulses normal, no pedal edema, no clubbing or cyanosis   Skin - normal coloration and turgor, no rashes, no suspicious skin lesions noted ,    DATA:    Labs:   CBC:   Recent Labs     10/27/20  0606 10/28/20  044   WBC 17.4* 14.0*   HGB 9.6* 9.3*   HCT 31.5* 33.0*   PLT See Reflexed IPF Result See Reflexed IPF Result     BMP:   Recent Labs     10/27/20  0817 10/28/20  0442    140   K 4.2 3.6*   CO2 23 21   BUN 6 7   CREATININE 0.34* 0.26*   LABGLOM >60 >60   GLUCOSE 121* 128*     PT/INR:   No results for input(s): PROTIME, INR in the last 72 hours. IMAGING DATA:  @IMG@   XR CHEST PORTABLE   Final Result   No acute findings. MRI LIMITED BRAIN   Final Result   Limited MRI of the brain demonstrates moderate acute ischemia involving both   occipital lobes and left parietal lobe involving the cortices. Small acute punctate infarct within the posterior left frontal lobe near the   convexity.       Hyperintense FLAIR signal abnormality within the cortices of both frontal   lobes and posterior left temporal lobe may represent subacute ischemia or   inflammatory process such as vasculitis. Posterior reversible   leukoencephalopathy is a possibility. IR LUMBAR PUNCTURE FOR DIAGNOSIS   Final Result   Successful fluoroscopic-guided lumbar puncture.              Mri Limited Brain     Result Date: 10/23/2020  Limited MRI of the brain demonstrates moderate acute ischemia involving both occipital lobes and left parietal lobe involving the cortices Small acute punctate infarct within the posterior left frontal lobe near the convexity Hyperintense FLAIR signal mildly within the cortices of both frontal lobes and posterior left temporal lobe may represent subacute ischemia or inflammatory process such as vasculitis. Posterior reversible leukoencephalopathy is a possibility      Primary Problem  New onset seizure ([address]    Active Hospital Problems    Diagnosis Date Noted    Encephalopathy [G93.40]     PRES (posterior reversible encephalopathy syndrome) [I67.83]     Hemianopia, bitemporal [H53.47]     Type 2 diabetes mellitus without complication, without long-term current use of insulin ([address] [E11.9]     Anemia [D64.9]     Splenic lesion [D73.89]     Ovarian cancer ([address] [C56.9]     Acute encephalopathy [G93.40]     New onset seizure ([address] [R56.9] 10/21/2020    Malignant neoplasm of ovary ([address] [C56.9] 08/17/2020     IMPRESSION:   1. AMS  2. Posterior reversible encephalopathy syndrome (PRES);   3. Recurrent ovarian cancer started on chemotherapy with single agent Doxil and Avastin on 9/18/2020. Patient has received only 1 cycle. Because of the recent GI bleed and vascular embolization Avastin was discontinued and second cycle of chemotherapy was scheduled on 10/23  4. Leukocytosis  5. Anemia   6. Thrombocytosis      RECOMMENDATIONS:  1. I reviewed the laboratory data, diagnosis, prognosis and treatment options  2.  I discussed with patient and family  3. Avastin can be associated with press syndrome. This is less likely secondary to Doxil. Regardless there was a plan to hold off Avastin down the road  4. Once acute issues resolve she will resume therapy with Doxil as outpatient  5. She would need repeat MRI brain for follow-up in few weeks  6. No acute oncologic interventions indicated at this point  7. Her thrombocytosis most likely reactive and also secondary to her splenic embolization and iron deficiency. 8. On iron supplement  9. Patient will follow up with Dr. Al Karimi as outpatient after discharge in 1 to 2 weeks  10. I had discussion with patient's family who were present with a video conference in patient's room      Discussed with patient and Nurse. Thank you for asking us to see this patient. Aryan Garcia MD  Hematologist/Medical Oncologist    Cell: [phone]      This note is created with the assistance of a speech recognition program.  While intending to generate a document that actually reflects the content of the visit, the document can still have some errors including those of syntax and sound a like substitutions which may escape proof reading. It such instances, actual meaning can be extrapolated by contextual diversion.

## 2020-10-28 NOTE — PROGRESS NOTES
NEUROLOGY INPATIENT PROGRESS NOTE    10/28/2020         Subjective: Geena Wills is a  60 y.o. female admitted on 10/22/2020 with New onset seizure [facility]) [R56.9]    Briefly, this is a  60 y.o. female admitted on 10/22/2020 as a transfer from [facility].  She initially presented there with concerns of confusion after receiving vaccinations (influenza and pneumococcal). While at [facility] she reportedly had an episode of seizure, generalized clonic in nature. She was given Ativan and loaded with Keppra. Initial lab work showed marked leukocytosis and patient was given antibiotics for suspected CNS infection. Upon arrival at [facility], patient continued to be lethargic and somnolent. No nuchal rigidity was initially noted however patient did have low-grade fever requiring Tylenol. Patient underwent an lumbar puncture which did not reveal any CNS infection. MRI brain had findings suspicious for PRES. Patient was started on LTME. ID and heme-onc consult obtained. Of note, patient has a history of ovarian cancer with initial diagnosis in 2005 with multiple recurrence. Follows with Dr. Tracy Sharp as outpatient. She was recently started on Doxil and Avastin on 9/18/2020. She received 1 dose of Avastin. This was discontinued due to recent GI bleeding at [facility] for which patient underwent embolization by IR for intra-abdominal bleeding due to splenic mass with GI infiltration. She received approximately 5 units of PRBC. Per heme-onc consult note, Avastin can be associated with PRES less likely secondary to Doxil. Avastin was discontinued anyhow and plan at this time is to resume with Doxil once able as outpatient. Today, she presently does not have a headache. Denies any seizure activity. Improved mentation. Blurry vision is getting better. No current facility-administered medications on file prior to encounter.       Current Outpatient Medications on File Prior to Encounter Medication Sig Dispense Refill    omeprazole (PRILOSEC) 20 MG delayed release capsule Take 20 mg by mouth daily      HYDROcodone-acetaminophen (NORCO) 5-325 MG per tablet Take 1 tablet by mouth every 6 hours as needed for Pain.  oxyCODONE (OXYCONTIN) 10 MG extended release tablet Take 10 mg by mouth every 12 hours.  prochlorperazine (COMPAZINE) 10 MG tablet Take 10 mg by mouth every 6 hours as needed      tiZANidine (ZANAFLEX) 4 MG tablet Take 4 mg by mouth every 6 hours as needed      ibuprofen (ADVIL;MOTRIN) 200 MG tablet Take 200 mg by mouth every 6 hours as needed for Pain      oxyCODONE-acetaminophen (PERCOCET)  MG per tablet Take 1-2 tablets by mouth every 4 hours.  pantoprazole (PROTONIX) 40 MG tablet Take 40 mg by mouth 2 times daily      metFORMIN (GLUCOPHAGE-XR) 500 MG extended release tablet Take 2 tablets by mouth 2 times daily 30 tablet 2    ferrous sulfate (IRON 325) 325 (65 Fe) MG tablet Take 1 tablet by mouth daily (with breakfast) (Patient not taking: Reported on 10/16/2020) 60 tablet 5    aspirin 81 MG chewable tablet Take 81 mg by mouth nightly      simvastatin (ZOCOR) 40 MG tablet Take 40 mg by mouth nightly      sertraline (ZOLOFT) 100 MG tablet Take 50 mg by mouth daily          Allergies: Dmitriy Reyes has No Known Allergies. Past Medical History:   Diagnosis Date    Anemia     Bleeding 10/2020    intra-abdominal bleeding -due to splenic mass with GI infiltration. Status post embolization    [facility]     Depression     Diabetes mellitus ([facility]     GERD (gastroesophageal reflux disease)     Metastatic cancer ([facility] 10/2020    extensive intraabdominal and splenic involvement and lung mets.     Ovarian cancer ([facility]     low grade serous ovarian carcinoma    Post chemo evaluation     2007: Chemo via med onc (Dr. Vadim Fry), 2008: Chemo due to rising CA-125, 2013: intraperitoneal chemo,12/2015: Ca125 - 25     Splenic lesion        Past negative for chest pain, palpitations, or syncope   GASTROINTESTINAL: negative for abdominal pain, nausea, vomiting, diarrhea, or constipation    GENITOURINARY: negative for incontinence or retention    MUSCULOSKELETAL: negative for neck or back pain, negative for extremity pain   NEUROLOGICAL: Negative for seizures, headaches, weakness, numbness, confusion, aphasia, dysarthria   PSYCHIATRIC: negative for agitation, hallucination, SI/HI   SKIN Negative for spontaneous contusions, rashes, or lesions        NEUROLOGIC EXAMINATION  GENERAL  Appears comfortable and in no distress   HEENT  NC/ AT   HEART  S1 and S2 heard; palpation of pulses: radial pulse    NECK  Supple and no bruits heard   MENTAL STATUS:  Alert, oriented, intact memory, no confusion, normal speech, normal language, no hallucination or delusion   CRANIAL NERVES: II     -      Bitemporal hemianopsia  III,IV,VI -  PERR, EOMs full, no ptosis  V     -     Normal facial sensation   VII    -     Normal facial symmetry  VIII   -     Intact hearing   IX,X -     Symmetrical palate  XI    -     Symmetrical shoulder shrug  XII   -     Midline tongue, no atrophy    MOTOR FUNCTION: RUE: Significant for good strength of grade 5/5 in proximal and distal muscle groups   LUE: Significant for good strength of grade 5/5 in proximal and distal muscle groups   RLE: Significant for good strength of grade 5/5 in proximal and distal muscle groups   LLE: Significant for good strength of grade 5/5 in proximal and distal muscle groups      Normal bulk, normal tone and no involuntary movements, no tremor   SENSORY FUNCTION:  Normal touch, normal pin, normal vibration, normal proprioception   CEREBELLAR FUNCTION:  Intact fine motor control over upper limbs and lower limbs   REFLEX FUNCTION:  Symmetric in upper and lower extremities, no Babinski sign   STATION and GAIT  Not tested, connected with vEEG     Data:    Lab Results:   CBC:   Recent Labs     10/26/20  0523 10/27/20  0606 10/28/20  0442   WBC 17.0* 17.4* 14.0*   HGB 9.4* 9.6* 9.3*   * See Reflexed IPF Result See Reflexed IPF Result     BMP:    Recent Labs     10/26/20  0523 10/27/20  0817 10/28/20  0442    138 140   K 3.5* 4.2 3.6*    104 105   CO2 21 23 21   BUN 4* 6 7   CREATININE 0.31* 0.34* 0.26*   GLUCOSE 126* 121* 128*         Lab Results   Component Value Date    ALT 5 10/23/2020    AST 10 10/23/2020    TSH 0.93 10/22/2020    INR 1.0 10/23/2020    [id number] 405 10/22/2020       No results found for: PHENYTOIN, PHENYTOIN, VALPROATE, CBMZ    IMAGING:  \"Care Everywhere Tab\"  CT Head [address]) - \"Negative brain CT. \"  CTA Head and Neck with contrast [address]) -   \"Negative CT angiogram [facility]. \"  \"Essentially negative CT angiogram cervical portion of the carotid arteries. \"    XR CHEST PORTABLE   Final Result   No acute findings. MRI LIMITED BRAIN   Final Result   Limited MRI of the brain demonstrates moderate acute ischemia involving both   occipital lobes and left parietal lobe involving the cortices. Small acute punctate infarct within the posterior left frontal lobe near the   convexity. Hyperintense FLAIR signal abnormality within the cortices of both frontal   lobes and posterior left temporal lobe may represent subacute ischemia or   inflammatory process such as vasculitis. Posterior reversible   leukoencephalopathy is a possibility. IR LUMBAR PUNCTURE FOR DIAGNOSIS   Final Result   Successful fluoroscopic-guided lumbar puncture. LTME started 10/22/20 @ 18:57 reviewed through 10/27/20 @ 07:00  See full report for complete details  Following is report from Day 5  Summary: During this day of recording no events were recorded. The interictal EEG was abnormal due to diffuse polymorphic delta and theta slowing suggesting moderate to severe encephalopathy.   Near continuous left central, parietal and temporal sharp wave discharges with fast activity c/w LPDs+F+R conferred increased risk for focal onset seizures. Monitoring was continued in order to record the patient's typical events. The EKG channel revealed no abnormalities.        DEVON Gutierrez MD  Diplomate, American Board of Psychiatry and Neurology  Diplomate, American Board of Clinical Neurophysiology  Diplomate, American Board of Epilepsy     LTME reading continued 10/26/2020 @ 7AM through 10/27/2020 1:30PM by Dr. Leonardo Menendez  See full report for complete details  IMPRESSION  The patient underwent continuous video EEG monitoring from 10/26/2020 at [address] [place] [address] to 10/27/2020 at 1:30PM, which showed epileptogenicity in bilateral parietal/occipital and evidence of moderate to severe diffuse encephalopathy, no EEG or clinical seizures were noted.         Amaris Perez MD, [address] [place] [address], Neurology  Board Certified Epileptologist     Assessment and Plan  22-year-old female with recurrent ovarian cancer initially admitted for confusion and new onset seizures. Likely PRES (posterior reversible encephalopathy syndrome), secondary to immunosuppression, with a possible adverse effect more likely due to Avastin which has been already discontinued due to GI bleed per oncology note, with a lesser possibility secondary to Doxil. Heme/onc recommendations noted and appreciated. PRES also could be secondary to recent blood pressure elevations. Currently on amlodipine 10mg. Focal onset seizures secondary to PRES. Likely DC LTME today. Continue Keppra to 1.5 g twice daily and Vimpat to 200 mg twice daily. Seizure precautions  Leukocytosis with no identifiable source at this time. ID recommendations noted and appreciated. Will monitor off antibiotics at this time  DVT prophylaxis    Continue PT/OT    Further recommendations may follow after discussing with attending physician.       Macarena Manuel MD   PGY-2 Neurology  10/28/2020  7:54 AM

## 2020-10-28 NOTE — DISCHARGE SUMMARY
[facility]  Office: [address], DO, Daphney Decker, DO, Brett Patee, DO, Venessa Mickey Blood, DO, Josh Milian MD, Destiny Gutierrez MD, Robinson Moreno MD, Cari Gonzales MD, Lanre Farias MD, Oc Ramirez MD, Priscila Krueger MD, Johnathon Arguelles MD, Mena Montiel MD, Ana Lilia Rabago DO, Leona Girard MD, Patricia Rodas MD, Bianka Chino, DO, Jack Kam MD,  Latoya Rota, DO, Lisbet Quinonez MD, Rasheeda Iqbal MD, Mark Dillon, [facility], [facility] Lamb, CNP, Myla Jung, CNP, Cinthia Calles, CNS, Elvis Aguilera, CNP, Hyacinth Newsome, CNP, Matty Magana, CNP, Heddy Sacks, CNP, Jana Harris, CNP, Malika Gayle PA-C, Kesha Rai, [facility], Carol Morris, CNP, Marely Madera, CNP, Richard Watsonos, CNP, Jose Maria Dural, CNP, Alexander Comfort, [facility]   [address] [facility]    Discharge Summary     Patient ID: López Pittman  :  1965   MRN: [id number]     ACCOUNT:  [de-identified]   Patient's PCP: No primary care provider on file. Admit Date: 10/22/2020   Discharge Date: 10/28/2020     Length of Stay: 6  Code Status:  Full Code  Admitting Physician: Leona Girard MD  Discharge Physician: Leona Girard MD     Active Discharge Diagnoses:     Hospital Problem Lists:  Principal Problem:    New onset seizure [facility])  Active Problems:    Malignant neoplasm of ovary ([facility]    Type 2 diabetes mellitus without complication, without long-term current use of insulin ([facility]    Anemia    Splenic lesion    [facility]    Acute encephalopathy    PRES (posterior reversible encephalopathy syndrome)    Hemianopia, bitemporal    Encephalopathy  Resolved Problems:    * No resolved hospital problems.  *      Admission Condition:  poor     Discharged Condition: fair    Hospital Stay:     Hospital Course:  López Pittman is a 60 y.o. female who was admitted for the management of   New onset seizure [facility]) , presented to ER with No chief complaint on file. 60 F presented with seizure episode. Patient currently lethargic, not cooperating with Steve Kyae intermittently, disoriented to place. On chart review initially presented to [facility] with confusion. Noted to have seizure episode, no prior history of seizures. Started on antibiotics, transferred to [facility] for further neurological workup.      MRI concerning for PRES syndrome. LP negative. Neurology consulted, started on LTME, demonstrating seizure activity. AEDs adjusted by neurology.  Mentation improved.       Physical exam     Alert  Chest: Clear to auscultation bilateral  Abdomen: Nontender nondistended  CVS: S1-S2  Neurology: Moves extremity positive weakness    Principal Problem:    New onset seizure ([address] LTM EEG Keppra and Vimpat follow-up with neurology in 1 week discharge instruction regarding seizure was given     Acute metabolic encephalopathy probably related to seizure and cancer medication  no evidence of infection as per ID   Active Problems:    Malignant neoplasm of ovary (HCC) Follow up with hemon        Type 2 diabetes mellitus without complication, without long-term current use of insulin (HCC)  Continue home medication       Anemia SEC to cancer and chemothrerapy transfuse if hgb below 7       Splenic lesion s/p embolization        PRES (posterior reversible encephalopathy syndrome) sec to chemotherapy     Hemianopia, bitemporal neurology following   Need repeat MRI as outpatient     Thrombocytosis monitor asa status post iron replacement most likely multifactorial secondary to splenic embolization and iron deficiency        Hypokalemia replaced  CMP in 1 week      Significant therapeutic interventions:     Significant Diagnostic Studies:   Labs / Micro:  CBC:   Lab Results   Component Value Date    WBC 14.0 10/28/2020    RBC 3.80 10/28/2020    HGB 9.3 10/28/2020    HCT 33.0 10/28/2020    MCV 86.8 10/28/2020    MCH 24.5 10/28/2020    MCHC 28.2 10/28/2020 RDW 21.2 10/28/2020    PLT See Reflexed IPF Result 10/28/2020     BMP:    Lab Results   Component Value Date    GLUCOSE 128 10/28/2020     10/28/2020    K 3.6 10/28/2020     10/28/2020    CO2 21 10/28/2020    ANIONGAP 14 10/28/2020    BUN 7 10/28/2020    CREATININE 0.26 10/28/2020    BUNCRER NOT REPORTED 10/28/2020    CALCIUM 9.2 10/28/2020    LABGLOM >60 10/28/2020    GFRAA >60 10/28/2020    GFR      10/28/2020    GFR NOT REPORTED 10/28/2020     HFP:    Lab Results   Component Value Date    PROT 6.7 10/23/2020     CMP:    Lab Results   Component Value Date    GLUCOSE 128 10/28/2020     10/28/2020    K 3.6 10/28/2020     10/28/2020    CO2 21 10/28/2020    BUN 7 10/28/2020    CREATININE 0.26 10/28/2020    ANIONGAP 14 10/28/2020    ALKPHOS 126 10/23/2020    ALT 5 10/23/2020    AST 10 10/23/2020    BILITOT <0.10 10/23/2020    LABALBU 3.1 10/23/2020    ALBUMIN 0.9 10/23/2020    LABGLOM >60 10/28/2020    GFRAA >60 10/28/2020    GFR      10/28/2020    GFR NOT REPORTED 10/28/2020    PROT 6.7 10/23/2020    CALCIUM 9.2 10/28/2020     PT/INR:    Lab Results   Component Value Date    PROTIME 11.0 10/23/2020    INR 1.0 10/23/2020     PTT:   Lab Results   Component Value Date    APTT 23.1 08/24/2020     FLP:  No results found for: CHOL, TRIG, HDL  U/A:    Lab Results   Component Value Date    COLORU YELLOW 10/24/2020    TURBIDITY TURBID 10/24/2020    SPECGRAV 1.026 10/24/2020    HGBUR NEGATIVE 10/24/2020    PHUR 6.5 10/24/2020    PROTEINU NEGATIVE 10/24/2020    GLUCOSEU NEGATIVE 10/24/2020    KETUA TRACE 10/24/2020    BILIRUBINUR NEGATIVE 10/24/2020    UROBILINOGEN Normal 10/24/2020    NITRU NEGATIVE 10/24/2020    LEUKOCYTESUR NEGATIVE 10/24/2020     TSH:    Lab Results   Component Value Date    TSH 0.93 10/22/2020        Radiology:  Xr Chest Portable    Result Date: 10/24/2020  No acute findings.      Ir Lumbar Puncture For Diagnosis    Result Date: 10/22/2020  Successful fluoroscopic-guided lumbar puncture. Mri Limited Brain    Result Date: 10/23/2020  Limited MRI of the brain demonstrates moderate acute ischemia involving both occipital lobes and left parietal lobe involving the cortices. Small acute punctate infarct within the posterior left frontal lobe near the convexity. Hyperintense FLAIR signal abnormality within the cortices of both frontal lobes and posterior left temporal lobe may represent subacute ischemia or inflammatory process such as vasculitis. Posterior reversible leukoencephalopathy is a possibility. Consultations:    Consults:     Final Specialist Recommendations/Findings:   IP CONSULT TO NEUROLOGY  IP CONSULT TO INFECTIOUS DISEASES  IP CONSULT TO HEM/ONC      The patient was seen and examined on day of discharge and this discharge summary is in conjunction with any daily progress note from day of discharge. Discharge plan:     Disposition: Home    Physician Follow Up: Lizabeth Bray, [facility] [address]  [phone]    In 1 week      Frederick Whitley [address]  [phone]    In 1 week         Requiring Further Evaluation/Follow Up POST HOSPITALIZATION/Incidental Findings:     Diet: cardiac diet and diabetic diet    Activity: As tolerated    Instructions to Patient:     Discharge Medications:      Medication List      START taking these medications    amLODIPine 10 MG tablet  Commonly known as:  NORVASC  Take 1 tablet by mouth daily  Start taking on:  October 29, 2020     lacosamide 200 MG tablet  Commonly known as:  VIMPAT  Take 1 tablet by mouth 2 times daily for 90 days.      levETIRAcetam 750 MG tablet  Commonly known as:  KEPPRA  Take 2 tablets by mouth 2 times daily     polyethylene glycol 17 g packet  Commonly known as:  GLYCOLAX  Take 17 g by mouth daily as needed for Constipation        CONTINUE taking these medications    aspirin 81 MG chewable tablet     ferrous sulfate 325 (65 Fe) MG tablet  Commonly known as:  IRON 325  Take 1 tablet by mouth daily (with breakfast)     HYDROcodone-acetaminophen 5-325 MG per tablet  Commonly known as:  NORCO     ibuprofen 200 MG tablet  Commonly known as:  ADVIL;MOTRIN     metFORMIN 500 MG extended release tablet  Commonly known as:  GLUCOPHAGE-XR  Take 2 tablets by mouth 2 times daily     omeprazole 20 MG delayed release capsule  Commonly known as:  PRILOSEC     oxyCODONE 10 MG extended release tablet  Commonly known as:  OXYCONTIN     oxyCODONE-acetaminophen  MG per tablet  Commonly known as:  PERCOCET     prochlorperazine 10 MG tablet  Commonly known as:  COMPAZINE     Protonix 40 MG tablet  Generic drug:  pantoprazole     sertraline 100 MG tablet  Commonly known as:  ZOLOFT     simvastatin 40 MG tablet  Commonly known as:  ZOCOR     tiZANidine 4 MG tablet  Commonly known as:  Michael Troy           Where to Get Your Medications      These medications were sent to [address]  [place]  [address]    Phone:  [phone]   · amLODIPine 10 MG tablet  · lacosamide 200 MG tablet  · levETIRAcetam 750 MG tablet  · polyethylene glycol 17 g packet         No discharge procedures on file. Time Spent on discharge is  35 mins in patient examination, evaluation, counseling as well as medication reconciliation, prescriptions for required medications, discharge plan and follow up. Electronically signed by   Mateus Worley MD  10/28/2020  12:17 PM      Thank you Dr. Marc Barton primary care provider on file. for the opportunity to be involved in this patient's care.

## 2020-10-29 VITALS
TEMPERATURE: 97.8 F | HEIGHT: 66 IN | SYSTOLIC BLOOD PRESSURE: 113 MMHG | WEIGHT: 192.68 LBS | BODY MASS INDEX: 30.97 KG/M2 | HEART RATE: 86 BPM | RESPIRATION RATE: 20 BRPM | DIASTOLIC BLOOD PRESSURE: 46 MMHG | OXYGEN SATURATION: 95 %

## 2020-10-29 LAB
ABSOLUTE EOS #: 0.3 K/UL (ref 0–0.4)
ABSOLUTE IMMATURE GRANULOCYTE: 0 K/UL (ref 0–0.3)
ABSOLUTE LYMPH #: 1.18 K/UL (ref 1–4.8)
ABSOLUTE MONO #: 0.44 K/UL (ref 0.1–0.8)
ANION GAP SERPL CALCULATED.3IONS-SCNC: 13 MMOL/L (ref 9–17)
BASOPHILS # BLD: 0 % (ref 0–2)
BASOPHILS ABSOLUTE: 0 K/UL (ref 0–0.2)
BUN BLDV-MCNC: 7 MG/DL (ref 6–20)
BUN/CREAT BLD: ABNORMAL (ref 9–20)
CALCIUM SERPL-MCNC: 9.2 MG/DL (ref 8.6–10.4)
CHLORIDE BLD-SCNC: 103 MMOL/L (ref 98–107)
CO2: 22 MMOL/L (ref 20–31)
CREAT SERPL-MCNC: 0.27 MG/DL (ref 0.5–0.9)
CULTURE: NORMAL
CULTURE: NORMAL
DIFFERENTIAL TYPE: ABNORMAL
EOSINOPHILS RELATIVE PERCENT: 2 % (ref 1–4)
GFR AFRICAN AMERICAN: >60 ML/MIN
GFR NON-AFRICAN AMERICAN: >60 ML/MIN
GFR SERPL CREATININE-BSD FRML MDRD: ABNORMAL ML/MIN/{1.73_M2}
GFR SERPL CREATININE-BSD FRML MDRD: ABNORMAL ML/MIN/{1.73_M2}
GLUCOSE BLD-MCNC: 161 MG/DL (ref 70–99)
HCT VFR BLD CALC: 34.1 % (ref 36.3–47.1)
HEMOGLOBIN: 9.7 G/DL (ref 11.9–15.1)
IMMATURE GRANULOCYTES: 0 %
LYMPHOCYTES # BLD: 8 % (ref 24–44)
Lab: NORMAL
Lab: NORMAL
MCH RBC QN AUTO: 25.2 PG (ref 25.2–33.5)
MCHC RBC AUTO-ENTMCNC: 28.4 G/DL (ref 28.4–34.8)
MCV RBC AUTO: 88.6 FL (ref 82.6–102.9)
MONOCYTES # BLD: 3 % (ref 1–7)
MORPHOLOGY: ABNORMAL
NRBC AUTOMATED: 0 PER 100 WBC
PDW BLD-RTO: 21.6 % (ref 11.8–14.4)
PLATELET # BLD: ABNORMAL K/UL (ref 138–453)
PLATELET ESTIMATE: ABNORMAL
PLATELET, FLUORESCENCE: 1124 K/UL (ref 138–453)
PLATELET, IMMATURE FRACTION: 1.3 % (ref 1.1–10.3)
PMV BLD AUTO: ABNORMAL FL (ref 8.1–13.5)
POTASSIUM SERPL-SCNC: 3.6 MMOL/L (ref 3.7–5.3)
RBC # BLD: 3.85 M/UL (ref 3.95–5.11)
RBC # BLD: ABNORMAL 10*6/UL
SEG NEUTROPHILS: 87 % (ref 36–66)
SEGMENTED NEUTROPHILS ABSOLUTE COUNT: 12.88 K/UL (ref 1.8–7.7)
SODIUM BLD-SCNC: 138 MMOL/L (ref 135–144)
SPECIMEN DESCRIPTION: NORMAL
SPECIMEN DESCRIPTION: NORMAL
WBC # BLD: 14.8 K/UL (ref 3.5–11.3)
WBC # BLD: ABNORMAL 10*3/UL

## 2020-10-29 PROCEDURE — 6370000000 HC RX 637 (ALT 250 FOR IP): Performed by: FAMILY MEDICINE

## 2020-10-29 PROCEDURE — 36415 COLL VENOUS BLD VENIPUNCTURE: CPT

## 2020-10-29 PROCEDURE — 99232 SBSQ HOSP IP/OBS MODERATE 35: CPT | Performed by: PSYCHIATRY & NEUROLOGY

## 2020-10-29 PROCEDURE — 85025 COMPLETE CBC W/AUTO DIFF WBC: CPT

## 2020-10-29 PROCEDURE — 92523 SPEECH SOUND LANG COMPREHEN: CPT

## 2020-10-29 PROCEDURE — 99232 SBSQ HOSP IP/OBS MODERATE 35: CPT | Performed by: INTERNAL MEDICINE

## 2020-10-29 PROCEDURE — 99239 HOSP IP/OBS DSCHRG MGMT >30: CPT | Performed by: INTERNAL MEDICINE

## 2020-10-29 PROCEDURE — 80048 BASIC METABOLIC PNL TOTAL CA: CPT

## 2020-10-29 PROCEDURE — 6360000002 HC RX W HCPCS: Performed by: NURSE PRACTITIONER

## 2020-10-29 PROCEDURE — 6370000000 HC RX 637 (ALT 250 FOR IP): Performed by: PHYSICIAN ASSISTANT

## 2020-10-29 PROCEDURE — 6370000000 HC RX 637 (ALT 250 FOR IP): Performed by: INTERNAL MEDICINE

## 2020-10-29 PROCEDURE — 85055 RETICULATED PLATELET ASSAY: CPT

## 2020-10-29 RX ADMIN — ACETAMINOPHEN 650 MG: 325 TABLET ORAL at 03:19

## 2020-10-29 RX ADMIN — LEVETIRACETAM 1500 MG: 500 TABLET, FILM COATED ORAL at 09:29

## 2020-10-29 RX ADMIN — ENOXAPARIN SODIUM 40 MG: 40 INJECTION SUBCUTANEOUS at 09:29

## 2020-10-29 RX ADMIN — AMLODIPINE BESYLATE 10 MG: 10 TABLET ORAL at 09:29

## 2020-10-29 RX ADMIN — LACOSAMIDE 200 MG: 100 TABLET, FILM COATED ORAL at 09:29

## 2020-10-29 ASSESSMENT — PAIN SCALES - WONG BAKER
WONGBAKER_NUMERICALRESPONSE: 6

## 2020-10-29 ASSESSMENT — PAIN SCALES - GENERAL
PAINLEVEL_OUTOF10: 0
PAINLEVEL_OUTOF10: 5

## 2020-10-29 NOTE — DISCHARGE SUMMARY
[facility]  Office: [address], DO, Nahomi Jung, DO, Isrrael Oliveira DO, Lennox Mars Blood, DO, Biju Connolly MD, Corwin Abrams MD, Clarence Rocha MD, Delonte Harley MD, Brant Davis MD, Angelica Abbott MD, Saima Menjivar MD, Shahab Hooper MD, Mena Eubanks MD, Cindy Simeon DO, Austin Rosas MD, Daniel Mahmood MD, Carmel Ramos DO, Radha Palma MD,  Hayley Fink DO, Burna Cockayne, MD, Jennifer Leiva MD, Ulises [facility], [facility] Lamb, CNP, Noreen Rush, CNP, Paddy Duane, CNS, Gustavo Sampson, CNP, Paulina William, CNP, Bharti Pinon, CNP, Jennifer Diaz, CNP, Julisa Rodriguez, CNP, Richmond Welsh PA-C, Daisey Meigs, [facility], Nel Pyle, CNP, Jerrica Guevara, CNP, Katerin Coronado, CNP, Harrison Mejia, CNP, Tasneem Aquino, [facility]   [address]    Discharge Summary     Patient ID: Adele Phalen  :  1965   MRN: [id number]     ACCOUNT:  [de-identified]   Patient's PCP: No primary care provider on file. Admit Date: 10/22/2020   Discharge Date: 10/29/2020     Length of Stay: 7  Code Status:  Full Code  Admitting Physician: Austin Rosas MD  Discharge Physician: Austin Rosas MD     Active Discharge Diagnoses:     Hospital Problem Lists:  Principal Problem:    New onset seizure [facility])  Active Problems:    Malignant neoplasm of ovary ([facility]    Type 2 diabetes mellitus without complication, without long-term current use of insulin ([facility]    Anemia    Splenic lesion    [facility]    Acute encephalopathy    PRES (posterior reversible encephalopathy syndrome)    Hemianopia, bitemporal    Encephalopathy  Resolved Problems:    * No resolved hospital problems.  *      Admission Condition:  poor     Discharged Condition: fair    Hospital Stay:     Hospital Course:  Adele Phalen is a 60 y.o. female who was admitted for the management of   New onset seizure [facility]) , presented to ER with No chief complaint on file. 60 F presented with seizure episode. Patient currently lethargic, not cooperating with Ardell Perking intermittently, disoriented to place. On chart review initially presented to [facility] with confusion. Noted to have seizure episode, no prior history of seizures. Started on antibiotics, transferred to [facility] for further neurological workup.      MRI concerning for PRES syndrome. LP negative. Neurology consulted, started on LTME, demonstrating seizure activity. AEDs adjusted by neurology.  Mentation improved.       Physical exam     Alert  Chest: Clear to auscultation bilateral  Abdomen: Nontender nondistended  CVS: S1-S2  Neurology: Moves extremity positive weakness    Principal Problem:    New onset seizure ([address] LTM EEG Keppra   follow-up with neurology in 1 week discharge instruction regarding seizure was given  Vimpat was denied by insurance according to neurology okay to DC Vimpat continue Keppra readjust as outpatient    Acute metabolic encephalopathy probably related to seizure and cancer medication  no evidence of infection as per ID   Active Problems:    Malignant neoplasm of ovary ([address] Follow up with hemonc        Type 2 diabetes mellitus without complication, without long-term current use of insulin (HCC)  Continue home medication       Anemia SEC to cancer and chemothrerapy transfuse if hgb below 7       Splenic lesion s/p embolization        PRES (posterior reversible encephalopathy syndrome) sec to chemotherapy     Hemianopia, bitemporal neurology following   Need repeat MRI as outpatient     Thrombocytosis monitor asa status post iron replacement most likely multifactorial secondary to splenic embolization and iron deficiency        Hypokalemia replaced  CMP in 1 week      Significant therapeutic interventions:     Significant Diagnostic Studies:   Labs / Micro:  CBC:   Lab Results   Component Value Date    WBC 14.8 10/29/2020    RBC 3.85 10/29/2020    HGB 9.7 10/29/2020    HCT 34.1 10/29/2020    MCV 88.6 10/29/2020    MCH 25.2 10/29/2020    MCHC 28.4 10/29/2020    RDW 21.6 10/29/2020    PLT See Reflexed IPF Result 10/29/2020     BMP:    Lab Results   Component Value Date    GLUCOSE 161 10/29/2020     10/29/2020    K 3.6 10/29/2020     10/29/2020    CO2 22 10/29/2020    ANIONGAP 13 10/29/2020    BUN 7 10/29/2020    CREATININE 0.27 10/29/2020    BUNCRER NOT REPORTED 10/29/2020    CALCIUM 9.2 10/29/2020    LABGLOM >60 10/29/2020    GFRAA >60 10/29/2020    GFR      10/29/2020    GFR NOT REPORTED 10/29/2020     HFP:    Lab Results   Component Value Date    PROT 6.7 10/23/2020     CMP:    Lab Results   Component Value Date    GLUCOSE 161 10/29/2020     10/29/2020    K 3.6 10/29/2020     10/29/2020    CO2 22 10/29/2020    BUN 7 10/29/2020    CREATININE 0.27 10/29/2020    ANIONGAP 13 10/29/2020    ALKPHOS 126 10/23/2020    ALT 5 10/23/2020    AST 10 10/23/2020    BILITOT <0.10 10/23/2020    LABALBU 3.1 10/23/2020    ALBUMIN 0.9 10/23/2020    LABGLOM >60 10/29/2020    GFRAA >60 10/29/2020    GFR      10/29/2020    GFR NOT REPORTED 10/29/2020    PROT 6.7 10/23/2020    CALCIUM 9.2 10/29/2020     PT/INR:    Lab Results   Component Value Date    PROTIME 11.0 10/23/2020    INR 1.0 10/23/2020     PTT:   Lab Results   Component Value Date    APTT 23.1 08/24/2020     FLP:  No results found for: CHOL, TRIG, HDL  U/A:    Lab Results   Component Value Date    COLORU YELLOW 10/24/2020    TURBIDITY TURBID 10/24/2020    SPECGRAV 1.026 10/24/2020    HGBUR NEGATIVE 10/24/2020    PHUR 6.5 10/24/2020    PROTEINU NEGATIVE 10/24/2020    GLUCOSEU NEGATIVE 10/24/2020    KETUA TRACE 10/24/2020    BILIRUBINUR NEGATIVE 10/24/2020    UROBILINOGEN Normal 10/24/2020    NITRU NEGATIVE 10/24/2020    LEUKOCYTESUR NEGATIVE 10/24/2020     TSH:    Lab Results   Component Value Date    TSH 0.93 10/22/2020        Radiology:  Xr Chest Portable    Result Date: 10/24/2020  No acute findings. Ir Lumbar Puncture For Diagnosis    Result Date: 10/22/2020  Successful fluoroscopic-guided lumbar puncture. Mri Limited Brain    Result Date: 10/23/2020  Limited MRI of the brain demonstrates moderate acute ischemia involving both occipital lobes and left parietal lobe involving the cortices. Small acute punctate infarct within the posterior left frontal lobe near the convexity. Hyperintense FLAIR signal abnormality within the cortices of both frontal lobes and posterior left temporal lobe may represent subacute ischemia or inflammatory process such as vasculitis. Posterior reversible leukoencephalopathy is a possibility. Consultations:    Consults:     Final Specialist Recommendations/Findings:   IP CONSULT TO NEUROLOGY  IP CONSULT TO INFECTIOUS DISEASES  IP CONSULT TO HEM/ONC  IP CONSULT TO HOME CARE NEEDS      The patient was seen and examined on day of discharge and this discharge summary is in conjunction with any daily progress note from day of discharge. Discharge plan:     Disposition: Home    Physician Follow Up:      Lizabeth Bray MD  [facility]  [phone]    In 4 weeks  post hospital visit s/p [address], [place] [address]  [phone]    In 1 week         Requiring Further Evaluation/Follow Up POST HOSPITALIZATION/Incidental Findings:     Diet: cardiac diet and diabetic diet    Activity: As tolerated    Instructions to Patient:     Discharge Medications:      Medication List      START taking these medications    amLODIPine 10 MG tablet  Commonly known as:  NORVASC  Take 1 tablet by mouth daily     levETIRAcetam 750 MG tablet  Commonly known as:  KEPPRA  Take 2 tablets by mouth 2 times daily     polyethylene glycol 17 g packet  Commonly known as:  GLYCOLAX  Take 17 g by mouth daily as needed for Constipation        CONTINUE taking these medications    aspirin 81 MG chewable tablet     ferrous sulfate 325 (65 Fe) MG tablet  Commonly known as:  IRON 325  Take 1 tablet by mouth daily (with breakfast)     HYDROcodone-acetaminophen 5-325 MG per tablet  Commonly known as:  NORCO     ibuprofen 200 MG tablet  Commonly known as:  ADVIL;MOTRIN     metFORMIN 500 MG extended release tablet  Commonly known as:  GLUCOPHAGE-XR  Take 2 tablets by mouth 2 times daily     omeprazole 20 MG delayed release capsule  Commonly known as:  PRILOSEC     oxyCODONE 10 MG extended release tablet  Commonly known as:  OXYCONTIN     oxyCODONE-acetaminophen  MG per tablet  Commonly known as:  PERCOCET     prochlorperazine 10 MG tablet  Commonly known as:  COMPAZINE     Protonix 40 MG tablet  Generic drug:  pantoprazole     sertraline 100 MG tablet  Commonly known as:  ZOLOFT     simvastatin 40 MG tablet  Commonly known as:  ZOCOR     tiZANidine 4 MG tablet  Commonly known as:  Lelan Leghorn           Where to Get Your Medications      These medications [facility] to [facility] [facility], [address], [address] [address]    Phone:  [phone]   · amLODIPine 10 MG tablet  · polyethylene glycol 17 g packet     You can get these medications from any pharmacy    Bring a paper prescription for each of these medications  · levETIRAcetam 750 MG tablet         No discharge procedures on file. Time Spent on discharge is  35 mins in patient examination, evaluation, counseling as well as medication reconciliation, prescriptions for required medications, discharge plan and follow up. Electronically signed by   Shelli Burch MD  10/29/2020  10:43 AM      Thank you Dr. Nabil Douglas primary care provider on file. for the opportunity to be involved in this patient's care.

## 2020-10-29 NOTE — ACP (ADVANCE CARE PLANNING)
Checked cover My Meds website this AM to see if patients Vimpat will be covered. Request was denied. Denial states that patient must have tried 2 other types of medications for controlling seizures and failed both therapies. Will notify Burke Cardenas informs me that Per Dr. Dillan peng fine will add to [address] [place] and Remove Vimpat.

## 2020-10-29 NOTE — PROGRESS NOTES
the emergency room at [facility]. At that time she was loaded with Keppra and given Ativan. She also was started on acyclovir, vancomycin, ceftriaxone and transferred to [facility] for further evaluation. Upon arrival to [place] [address] she was difficult to arouse but responsive to noxious stimuli. Subsequently when she was examined on the Neurology unit she was alert, cogent, followed commands, was able to move all extremities and bend her neck to touch her chest without any problems. Ovarian carcinoma history:  Patient has history of ovarian cancer with multiple recurrences. 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 She underwent surgical debulking and systemic treatment with Taxol and carboplatin for 6 cycles. Patient was in remission for about 2 years. She had a relapse in 2007 and was treated with topotecan with good results. Patient relapsed again in 2008 and was treated with Taxol carboplatin. After 3 cycles of systemic chemotherapy she had problems with carboplatin so she finished 3 more cycles with Taxol. She did well again for 2 to 3 years until she had a relapse in 2012 and she had intraperitoneal chemotherapy treatment with Taxol. Patient was last seen by her oncologist in 2014 at which time she had relatively stable disease with normal tumor marker and no significant abnormal images. Patient moved to [place] after that and she was lost for oncology follow-ups. Patient was recently evaluated again here in [facility] because of abdominal discomfort. Re-imaging confirmed metastatic relapse. Biopsy confirmed ovarian cancer recurrence. Scan showed extensive intraabdominal and splenic involvement and lung mets. Patient was receiving therapy with Doxil/Avastini as of 9-.      Recent Hospitalization:  Patient seen at [place] [address] for an upper GI bleed.  She underwent embolization with interventional radiology for (Dr. Vadim Fry), 2008: Cally Cool due to rising CA-125, 2013: intraperitoneal chemo,12/2015: Ca125 - 25     Splenic lesion        Past Surgical  History:     Past Surgical History:   Procedure Laterality Date    ABSCESS DRAINAGE  2013    Franca rectal    ANUS SURGERY      ANAL FISSURECTOMY    COLECTOMY  03/2013    ex lap, tumor debulking, transverse colectomy w reanastamosis, subgastric omentectomy, intraperitoneal port placement    HYSTERECTOMY, TOTAL ABDOMINAL      IR EMBOLIZATION HEMORRHAGE  10/05/2020    intra-abdominal bleeding -due to splenic mass with GI infiltration. Status post embolization [employer] interlock coils x7. mri condtional 3t ok, safe immediately post implant.     IR PORT PLACEMENT EQUAL OR GREATER THAN 5 YEARS  8/24/2020    IR PORT PLACEMENT EQUAL OR GREATER THAN 5 YEARS 8/24/2020 Susi Perez MD [facility]    PORT SURGERY      IP Port    TONSILLECTOMY         Medications:      iron sucrose  200 mg Intravenous Q24H    amLODIPine  10 mg Oral Daily    levETIRAcetam  1,500 mg Oral BID    lacosamide  200 mg Oral BID    LORazepam  2 mg Intravenous Once    enoxaparin  40 mg Subcutaneous Daily    sodium chloride flush  10 mL Intravenous 2 times per day    lidocaine PF  5 mL Intradermal Once       Social History:     Social History     Socioeconomic History    Marital status: Single     Spouse name: Not on file    Number of children: Not on file    Years of education: Not on file    Highest education level: Not on file   Occupational History    Not on file   Social Needs    Financial resource strain: Not on file    Food insecurity     Worry: Not on file     Inability: Not on file    Transportation needs     Medical: Not on file     Non-medical: Not on file   Tobacco Use    Smoking status: Current Every Day Smoker     Packs/day: 1.00    Smokeless tobacco: Current User   Substance and Sexual Activity    Alcohol use: Not Currently    Drug use: Never    Sexual activity: Not on file   Lifestyle    Physical activity     Days per week: Not on file     Minutes per session: Not on file    Stress: Not on file   Relationships    Social connections     Talks on phone: Not on file     Gets together: Not on file     Attends [employer] service: Not on file     Active member of club or organization: Not on file     Attends meetings of clubs or organizations: Not on file     Relationship status: Not on file    Intimate partner violence     Fear of current or ex partner: Not on file     Emotionally abused: Not on file     Physically abused: Not on file     Forced sexual activity: Not on file   Other Topics Concern    Not on file   Social History Narrative    Not on file       Family History:     Family History   Problem Relation Age of Onset    Alcohol Abuse Mother     Cirrhosis Mother         Allergies:   Patient has no known allergies. Review of Systems:   Constitutional: No fevers or chills. No systemic complaints  Head: No headaches  Eyes: No double vision or blurry vision. No conjunctival inflammation. ENT: No sore throat or runny nose. No hearing loss, tinnitus or vertigo. Cardiovascular: No chest pain or palpitations. No shortness of breath. No CONTRERAS  Lung: No shortness of breath or cough. No sputum production  Abdomen: No nausea, vomiting, diarrhea, or abdominal pain. No cramps. Genitourinary: No increased urinary frequency, or dysuria. No hematuria. No suprapubic or CVA pain  Musculoskeletal: No muscle aches or pains. No joint effusions, swelling or deformities  Hematologic: No bleeding or bruising. Neurologic: Frontal headache improved from yesterday. No weakness, numbness, or tingling. Seizure x 1  Integument: No rash, no ulcers. Psychiatric: No depression. Endocrine: No polyuria, no polydipsia, no polyphagia.     Physical Examination :     Patient Vitals for the past 8 hrs:   Temp Temp src Pulse Resp   10/29/20 0400 98 °F (36.7 °C) Oral 79 16     General Appearance: Awake, alert, and in no apparent distress  Head:  Normocephalic, no trauma  Eyes: Pupils equal, round, reactive to light and accommodation; extraocular movements intact; sclera anicteric; conjunctivae pink. No embolic phenomena. ENT: Oropharynx clear, without erythema, exudate, or thrush. No tenderness of sinuses. Mouth/throat: mucosa pink and moist. No lesions. Dentition in good repair. Neck:Supple, without lymphadenopathy. Thyroid normal, No bruits. Pulmonary/Chest: Clear to auscultation, without wheezes, rales, or rhonchi. No dullness to percussion. Cardiovascular: Regular rate and rhythm without murmurs, rubs, or gallops. Abdomen: Soft, non tender. Bowel sounds normal. No organomegaly  All four Extremities: No cyanosis, clubbing, edema, or effusions. Neurologic: Able to move extremities, follows all commands  Skin: Warm and dry with good turgor. No signs of peripheral arterial or venous insufficiency. No ulcerations. No open wounds. Medical Decision Making -Laboratory:   I have independently reviewed/ordered the following labs:    CBC with Differential:   Recent Labs     10/28/20  0442 10/29/20  0447   WBC 14.0* 14.8*   HGB 9.3* 9.7*   HCT 33.0* 34.1*   PLT See Reflexed IPF Result See Reflexed IPF Result   LYMPHOPCT 9* 8*   MONOPCT 9 3     BMP:   Recent Labs     10/28/20  0442 10/29/20  0447    138   K 3.6* 3.6*    103   CO2 21 22   BUN 7 7   CREATININE 0.26* 0.27*     Hepatic Function Panel:   No results for input(s): PROT, LABALBU, BILIDIR, IBILI, BILITOT, ALKPHOS, ALT, AST in the last 72 hours. No results for input(s): RPR in the last 72 hours. No results for input(s): HIV in the last 72 hours. No results for input(s): BC in the last 72 hours.   Lab Results   Component Value Date    MUCUS NOT REPORTED 10/24/2020    RBC 3.85 10/29/2020    TRICHOMONAS NOT REPORTED 10/24/2020    WBC 14.8 10/29/2020    YEAST NOT REPORTED 10/24/2020    TURBIDITY TURBID 10/24/2020     Lab Results   Component Value Date    CREATININE 0.27 10/29/2020    GLUCOSE 161 10/29/2020       Medical Decision Making-Imaging:     MRI OF THE BRAIN WITHOUT CONTRAST  10/22/2020 1:06 pm         TECHNIQUE:    Multiplanar multisequence MRI of the brain was performed without the    administration of intravenous contrast.         COMPARISON:    None.         HISTORY:    ORDERING SYSTEM PROVIDED HISTORY: new onset seizures, ovarian CA with diffuse    mets    TECHNOLOGIST PROVIDED HISTORY:    Pt given multiple doses of meds. Keeps moving. Best study possible at this    time. new onset seizures, ovarian CA with diffuse mets         FINDINGS:    INTRACRANIAL STRUCTURES/VENTRICLES: There is restricted diffusion identified    within the cortex of both occipital lobes and left parietal lobe compatible    with acute ischemia.  Small additional punctate infarct is identified within    the left frontal lobe near the convexity.         High FLAIR signal abnormality is identified within the cortices of both    frontal lobes and posterior left temporal lobe which could represent subacute    ischemia.  Mild chronic microvascular disease is noted within the    periventricular white matter.         No mass effect or midline shift. No evidence of an acute intracranial    hemorrhage.  The ventricles and sulci are normal in size and configuration.     The sellar/suprasellar regions appear unremarkable.  The normal signal voids    within the major intracranial vessels appear maintained.         ORBITS: The visualized portion of the orbits demonstrate no acute abnormality.         SINUSES: The visualized paranasal sinuses and mastoid air cells are well    aerated.         BONES/SOFT TISSUES: The bone marrow signal intensity appears normal. The soft    tissues demonstrate no acute abnormality.              Impression    Limited MRI of the brain demonstrates moderate acute ischemia involving both    occipital lobes and left parietal lobe involving the cortices.         Small acute punctate infarct within the posterior left frontal lobe near the    convexity.         Hyperintense FLAIR signal abnormality within the cortices of both frontal    lobes and posterior left temporal lobe may represent subacute ischemia or    inflammatory process such as vasculitis.  Posterior reversible    leukoencephalopathy is a possibility. Medical Decision Amrgtz-Zjkzzsuv-Ohrea:     Results for Johnathan Albrecht (MRN [id number]) as of 10/23/2020 21:07   Ref. Range 10/22/2020 16:05   Albumin Index Latest Ref Range: <9.0  39.1 (H)   Albumin, CSF Latest Ref Range: 70 - 350 mg/L 129   Appearance, CSF Unknown CLEAR   Glucose, CSF Latest Ref Range: 40 - 70 mg/dL 78 (H)   IgG Index, CSF Latest Ref Range: <0.70  0.59   IgG Synthesis Rate, CSF Latest Ref Range: <3.3 mg/24 h < 3.3   IgG, CSF Latest Ref Range: 0.5 - 7.0 mg/dL 1.7   Oligo Bands Unknown Oligoclonal bands are performed at [address] using isoelectric focusing and immunofixation. OLIGOCLONAL BANDING Unknown Rpt (A)   Protein, CSF Latest Ref Range: 15.0 - 45.0 mg/dL 25.4   Supernatant Color, CSF Unknown NOT REPORTED   Volume, CSF Unknown 5   RBC, CSF Latest Ref Range: 0 /mm3 0   WBC, CSF Latest Ref Range: <5 /mm3 0   Tube Number, CSF Unknown 3     Results for Johnathan Albrecht (MRN [id number]) as of 10/23/2020 21:07   Ref. Range 10/5/2020 10:46    Latest Ref Range: <38 U/mL 223 (H)     Results for Johnathan Albrecht (MRN [id number]) as of 10/23/2020 21:07   Ref.  Range 10/23/2020 01:26   HAEMOPHILUS INFLUENZA CSF FILM ARRAY Latest Ref Range: Not Detected  Not Detected   HHV-6 (HERPESVIRUS 6) CSF FILM ARRAY Latest Ref Range: Not Detected  Not Detected   HSV-1 CSF FILM ARRAY Latest Ref Range: Not Detected  Not Detected   HSV-2 CSF FILM ARRAY Latest Ref Range: Not Detected  Not Detected   PARECHOVIRUS CSF FILM ARRAY Latest Ref Range: Not Detected  Not Detected   ESCHERICHIA COLI K1 CSF FILM ARRAY Latest Ref Range: Not Detected  Not Detected   LISTERIA MONOCYTOGENES CSF FILM ARRAY Latest Ref Range: Not Detected  Not Detected   NEISSERIA MENIGITIDIS CSF FILM ARRAY Latest Ref Range: Not Detected  Not Detected   STREPTOCOCCUS AGALACTIAE CSF FILM ARRAY Latest Ref Range: Not Detected  Not Detected   STREPTOCOCCUS PNEUMONIAE CSF FILM ARRAY Latest Ref Range: Not Detected  Not Detected   CRYPTOCOCCUS NEOFORMANS/CARMEN CSF FILM ARR. Latest Ref Range: Not Detected  Not Detected       Medical Decision Making-Other:     Note:  · Labs, medications, radiologic studies were reviewed with personal review of films  · Large amounts of data were reviewed  · Discussed with nursing Staff, Discharge planner  · Infection Control and Prevention measures reviewed  · All prior entries were reviewed  · Administer medications as ordered  · Prognosis: Fair  · Discharge planning reviewed  · Follow up as outpatient. Thank you for allowing us to participate in the care of this patient. Please call with questions. Jimenez Quinn     ATTESTATION:    I have discussed the case, including pertinent history and exam findings with the residents and students. I have seen and examined the patient and the key elements of the encounter have been performed by me. I was present when the student obtained his information or examined the patient. I have reviewed the laboratory data, other diagnostic studies and discussed them with the residents. I have updated the medical record where necessary. I agree with the assessment, plan and orders as documented by the resident/ student.     Kaycee Singh MD.      Pager: [phone] - Office: [phone]

## 2020-10-29 NOTE — PROGRESS NOTES
NEUROLOGY INPATIENT PROGRESS NOTE    10/29/2020         Subjective: Rosanne Peterson is a  60 y.o. female admitted on 10/22/2020 with New onset seizure [facility]) [R56.9]    Briefly, this is a  60 y.o. female admitted on 10/22/2020 as a transfer from [facility].  She initially presented there with concerns of confusion after receiving vaccinations (influenza and pneumococcal). While at [facility] she reportedly had an episode of seizure, generalized clonic in nature. She was given Ativan and loaded with Keppra. Initial lab work showed marked leukocytosis and patient was given antibiotics for suspected CNS infection. Upon arrival at [facility], patient continued to be lethargic and somnolent. No nuchal rigidity was initially noted however patient did have low-grade fever requiring Tylenol. Patient underwent an lumbar puncture which did not reveal any CNS infection. MRI brain had findings suspicious for PRES. Patient was started on LTME. ID and heme-onc consult obtained. Of note, patient has a history of ovarian cancer with initial diagnosis in 2005 with multiple recurrence. Follows with Dr. Hill Story as outpatient. She was recently started on Doxil and Avastin on 9/18/2020. She received 1 dose of Avastin. This was discontinued due to recent GI bleeding at [facility] for which patient underwent embolization by IR for intra-abdominal bleeding due to splenic mass with GI infiltration. She received approximately 5 units of PRBC. Per heme-onc consult note, Avastin can be associated with PRES less likely secondary to Doxil. Avastin was discontinued anyhow and plan at this time is to resume with Doxil once able as outpatient. Today, she presently does not have a headache. Denies any seizure activity. Improved mentation. Blurry vision is getting better. No acute events noted overnight. No current facility-administered medications on file prior to encounter.       Current Outpatient Medications on File Prior to Encounter   Medication Sig Dispense Refill    omeprazole (PRILOSEC) 20 MG delayed release capsule Take 20 mg by mouth daily      HYDROcodone-acetaminophen (NORCO) 5-325 MG per tablet Take 1 tablet by mouth every 6 hours as needed for Pain.  oxyCODONE (OXYCONTIN) 10 MG extended release tablet Take 10 mg by mouth every 12 hours.  prochlorperazine (COMPAZINE) 10 MG tablet Take 10 mg by mouth every 6 hours as needed      tiZANidine (ZANAFLEX) 4 MG tablet Take 4 mg by mouth every 6 hours as needed      ibuprofen (ADVIL;MOTRIN) 200 MG tablet Take 200 mg by mouth every 6 hours as needed for Pain      oxyCODONE-acetaminophen (PERCOCET)  MG per tablet Take 1-2 tablets by mouth every 4 hours.  pantoprazole (PROTONIX) 40 MG tablet Take 40 mg by mouth 2 times daily      metFORMIN (GLUCOPHAGE-XR) 500 MG extended release tablet Take 2 tablets by mouth 2 times daily 30 tablet 2    ferrous sulfate (IRON 325) 325 (65 Fe) MG tablet Take 1 tablet by mouth daily (with breakfast) (Patient not taking: Reported on 10/16/2020) 60 tablet 5    aspirin 81 MG chewable tablet Take 81 mg by mouth nightly      simvastatin (ZOCOR) 40 MG tablet Take 40 mg by mouth nightly      sertraline (ZOLOFT) 100 MG tablet Take 50 mg by mouth daily          Allergies: Prince Lester has No Known Allergies. Past Medical History:   Diagnosis Date    Anemia     Bleeding 10/2020    intra-abdominal bleeding -due to splenic mass with GI infiltration. Status post embolization    [facility]     Depression     Diabetes mellitus ([facility]     GERD (gastroesophageal reflux disease)     Metastatic cancer ([facility] 10/2020    extensive intraabdominal and splenic involvement and lung mets.     Ovarian cancer ([facility]     low grade serous ovarian carcinoma    Post chemo evaluation     2007: Chemo via med onc (Dr. Ana Penn), 2008: Chemo due to rising CA-125, 2013: intraperitoneal chemo,12/2015: Ca125 - 25  Splenic lesion        Past Surgical History:   Procedure Laterality Date    ABSCESS DRAINAGE  2013    Franca rectal    ANUS SURGERY      ANAL FISSURECTOMY    COLECTOMY  03/2013    ex lap, tumor debulking, transverse colectomy w reanastamosis, subgastric omentectomy, intraperitoneal port placement    HYSTERECTOMY, TOTAL ABDOMINAL      IR EMBOLIZATION HEMORRHAGE  10/05/2020    intra-abdominal bleeding -due to splenic mass with GI infiltration. Status post embolization [employer] interlock coils x7. mri condtional 3t ok, safe immediately post implant.  IR PORT PLACEMENT EQUAL OR GREATER THAN 5 YEARS  8/24/2020    IR PORT PLACEMENT EQUAL OR GREATER THAN 5 YEARS 8/24/2020 Jaspreet Hebert MD [facility]    PORT SURGERY      IP Port    TONSILLECTOMY         Medications:     iron sucrose  200 mg Intravenous Q24H    amLODIPine  10 mg Oral Daily    levETIRAcetam  1,500 mg Oral BID    lacosamide  200 mg Oral BID    LORazepam  2 mg Intravenous Once    enoxaparin  40 mg Subcutaneous Daily    sodium chloride flush  10 mL Intravenous 2 times per day    lidocaine PF  5 mL Intradermal Once     PRN Meds include: potassium chloride **OR** potassium alternative oral replacement **OR** potassium chloride, loperamide, labetalol, [DISCONTINUED] acetaminophen **OR** acetaminophen, LORazepam, nicotine, polyethylene glycol, promethazine **OR** ondansetron, sodium chloride flush, acetaminophen    Objective:   BP (!) 145/70   Pulse 79   Temp 98 °F (36.7 °C) (Oral)   Resp 16   Ht 5' 6\" (1.676 m)   Wt 192 lb 10.9 oz (87.4 kg)   SpO2 98%   BMI 31.10 kg/m²     Blood pressure range: Systolic (07FZN), MXN:887 , Min:135 , VSA:939   ; Diastolic (79QNX), YTS:54, Min:70, Max:79      ROS:  CONSTITUTIONAL: negative for fatigue and malaise   EYES: Positive for vision changes.  Negative for double vision and photophobia    HEENT: negative for tinnitus and sore throat   RESPIRATORY: negative for cough, shortness 10/27/20  0606 10/28/20  0442 10/29/20  0447   WBC 17.4* 14.0* 14.8*   HGB 9.6* 9.3* 9.7*   PLT See Reflexed IPF Result See Reflexed IPF Result See Reflexed IPF Result     BMP:    Recent Labs     10/27/20  0817 10/28/20  0442 10/29/20  0447    140 138   K 4.2 3.6* 3.6*    105 103   CO2 23 21 22   BUN 6 7 7   CREATININE 0.34* 0.26* 0.27*   GLUCOSE 121* 128* 161*         Lab Results   Component Value Date    ALT 5 10/23/2020    AST 10 10/23/2020    TSH 0.93 10/22/2020    INR 1.0 10/23/2020    [id number] 405 10/22/2020       No results found for: PHENYTOIN, PHENYTOIN, VALPROATE, CBMZ    IMAGING:  \"Care Everywhere Tab\"  CT Head [facility]) - \"Negative brain CT. \"  CTA Head and Neck with contrast [facility]) -   \"Negative CT angiogram [facility]. \"  \"Essentially negative CT angiogram cervical portion of the carotid arteries. \"    XR CHEST PORTABLE   Final Result   No acute findings. MRI LIMITED BRAIN   Final Result   Limited MRI of the brain demonstrates moderate acute ischemia involving both   occipital lobes and left parietal lobe involving the cortices. Small acute punctate infarct within the posterior left frontal lobe near the   convexity. Hyperintense FLAIR signal abnormality within the cortices of both frontal   lobes and posterior left temporal lobe may represent subacute ischemia or   inflammatory process such as vasculitis. Posterior reversible   leukoencephalopathy is a possibility. IR LUMBAR PUNCTURE FOR DIAGNOSIS   Final Result   Successful fluoroscopic-guided lumbar puncture. LTME started 10/22/20 @ 18:57 reviewed through 10/27/20 @ 07:00  See full report for complete details  Following is report from Day 5  Summary: During this day of recording no events were recorded. The interictal EEG was abnormal due to diffuse polymorphic delta and theta slowing suggesting moderate to severe encephalopathy.   Near continuous left central, parietal and temporal sharp wave discharges with fast activity c/w LPDs+F+R conferred increased risk for focal onset seizures. Monitoring was continued in order to record the patient's typical events. The EKG channel revealed no abnormalities.        DEVON Bartlett MD  Diplomate, American Board of Psychiatry and Neurology  Diplomate, American Board of Clinical Neurophysiology  Diplomate, American Board of Epilepsy     LTME reading continued 10/26/2020 @ 7AM through 10/27/2020 1:30PM by Dr. Agnes Ryan  See full report for complete details  IMPRESSION  The patient underwent continuous video EEG monitoring from 10/26/2020 at [id number] [place] [address] to 10/27/2020 at 1:30PM, which showed epileptogenicity in bilateral parietal/occipital and evidence of moderate to severe diffuse encephalopathy, no EEG or clinical seizures were noted.         Dorothea Becerril MD, [address] [place] [address], Neurology  Board Certified Epileptologist     Assessment and Plan  28-year-old female with recurrent ovarian cancer initially admitted for confusion and new onset seizures. Likely PRES (posterior reversible encephalopathy syndrome), secondary to immunosuppression, with a possible adverse effect more likely due to Avastin which has been already discontinued due to GI bleed per oncology note, with a lesser possibility secondary to Doxil. Heme/onc recommendations noted and appreciated. PRES also could be secondary to recent blood pressure elevations. Currently on amlodipine 10mg. Focal onset seizures secondary to PRES. Continue Keppra to 1.5 g twice daily and follow up outpatient. Insurance declined Vimpat 200 mg twice daily. Seizure precautions  Leukocytosis with no identifiable source at this time. ID recommendations noted and appreciated. Will monitor off antibiotics at this time  DVT prophylaxis    Walked 100ft with RW with PT. Stable for discharge from neurological standpoint.         Beto Montiel MD   PGY-2 Neurology  10/29/2020  7:55 AM

## 2020-10-29 NOTE — PROGRESS NOTES
rocky        Speech Language Pathology  Facility/Department: [facility] NEURO  Initial Speech/Language/Cognitive Assessment    NAME: [facility]  : 1965   MRN: [id number]  ADMISSION DATE: 10/22/2020  ADMITTING DIAGNOSIS: has Malignant neoplasm of ovary ([address] New onset seizure ([address] Type 2 diabetes mellitus without complication, without long-term current use of insulin ([address] Anemia; Splenic lesion; Ovarian cancer ([address] Acute encephalopathy; PRES (posterior reversible encephalopathy syndrome); Hemianopia, bitemporal; and Encephalopathy on their problem list.    Date of Eval: 10/29/2020   Evaluating Therapist: Gabrielle Cohen    Primary Complaint: Prince Lester is a 60 y.o. Non-/non  female who presents with new onset seizure and is admitted to the hospital for the management of New onset seizure ([facility] [address] [facility] Patient presented to [facility] with the concern of having confusion after receiving influenza and pneumonia pneumococcal vaccine. Per nursing report patient had an episode of a seizure in the emergency room at that time she was loaded with Keppra and given Ativan started on acyclovir 10 mg/kg every 8, vancomycin, ceftriaxone and transferred for further evaluation. Upon arrival to [place] [address] she is difficult to arouse but responsive to noxious stimuli. She does not follow commands but moves all 4 extremities. She does not partake in the review of systems or HPI and there is no family present for communication. Pain:  Pain Assessment  Pain Assessment: 0-10  Pain Level: 0  Bautista-Castellano Pain Rating: Hurts even more  Patient's Stated Pain Goal: No pain  Pain Type: Acute pain  Pain Location: Head  Functional Pain Assessment: Prevents or interferes some active activities and ADLs  Response to Pain Intervention: Patient Satisfied    Assessment: Pt presents with no apparent cognitive deficits at this time. No dysarthria noted, no oral motor deficits.  No further ST is recommended. Verbal education provided. Recommendations:  Requires SLP Intervention: No     D/C Recommendations: No therapy recommended at discharge. Plan:   Goals:      Patient/family involved in developing goals and treatment plan: yes    Subjective:  General  Chart Reviewed: Yes  Patient assessed for rehabilitation services?: Yes  Family / Caregiver Present: No  Social/Functional History  Lives With: Son  Vision  Vision: Impaired  Vision Exceptions: Wears glasses at all times  Hearing  Hearing: Within functional limits           Objective:     Oral/Motor  Oral Motor: Within functional limits                             Motor Speech  Motor Speech: Within Functional Limits         Cognition:      Orientation  Overall Orientation Status: Within Normal Limits  Attention  Attention: Within Functional Limits  Memory  Memory: Within Funtional Limits  Problem Solving  Problem Solving: Within Functional Limits  Abstract Reasoning  Abstract Reasoning: Within Functional Limits  Safety/Judgement  Safety/Judgement: Within Functional Limits   Word Associations: Within Functional Limits  Sequencing: Within Functional Limits  Word Generation: Within Functional Limits               Prognosis:  Speech Therapy Prognosis  Prognosis: Excellent  Individuals consulted  Consulted and agree with results and recommendations: Patient    Education:  Patient Education: yes  Patient Education Response: Verbalizes understanding          Therapy Time:   Individual Concurrent Group Co-treatment   Time In [phone]         Time Out 0943         Minutes 8               Completed by: Jayesh Hilario  Clinician    Cosigned By: Aravind Bill S.CCC/SLP      10/29/2020 11:16 AM

## 2020-10-30 ENCOUNTER — TELEPHONE (OUTPATIENT)
Dept: ONCOLOGY | Age: 55
End: 2020-10-30

## 2020-10-30 NOTE — TELEPHONE ENCOUNTER
Call received from patient's son requesting to schedule post hospital f/u appt. Appt scheduled for 11/6 @ 12:45pm with Dr Adelina Miller. Patient to have cbc/cmp drawn prior to appt. Constantino Deleon, , to schedule appt.  Maximiliano North

## 2020-11-01 LAB
SEND OUT REPORT: NORMAL
TEST NAME: NORMAL

## 2020-11-06 ENCOUNTER — TELEPHONE (OUTPATIENT)
Dept: ONCOLOGY | Age: 55
End: 2020-11-06

## 2020-11-06 ENCOUNTER — OFFICE VISIT (OUTPATIENT)
Dept: ONCOLOGY | Age: 55
End: 2020-11-06
Payer: COMMERCIAL

## 2020-11-06 ENCOUNTER — HOSPITAL ENCOUNTER (OUTPATIENT)
Dept: INFUSION THERAPY | Age: 55
Discharge: HOME | End: 2020-11-06
Payer: COMMERCIAL

## 2020-11-06 ENCOUNTER — HOSPITAL ENCOUNTER (OUTPATIENT)
Age: 55
Discharge: HOME | End: 2020-11-06
Payer: COMMERCIAL

## 2020-11-06 VITALS
WEIGHT: 168.7 LBS | TEMPERATURE: 98.4 F | BODY MASS INDEX: 27.23 KG/M2 | SYSTOLIC BLOOD PRESSURE: 122 MMHG | DIASTOLIC BLOOD PRESSURE: 68 MMHG | HEART RATE: 106 BPM

## 2020-11-06 DIAGNOSIS — C56.9 MALIGNANT NEOPLASM OF OVARY, UNSPECIFIED LATERALITY (HCC): ICD-10-CM

## 2020-11-06 LAB
ABSOLUTE EOS #: 0.37 K/UL (ref 0–0.4)
ABSOLUTE IMMATURE GRANULOCYTE: ABNORMAL K/UL (ref 0–0.3)
ABSOLUTE LYMPH #: 1.97 K/UL (ref 1–4.8)
ABSOLUTE MONO #: 0.86 K/UL (ref 0.1–0.8)
ALBUMIN SERPL-MCNC: 3.9 G/DL (ref 3.5–5.2)
ALBUMIN/GLOBULIN RATIO: 0.9 (ref 1–2.5)
ALP BLD-CCNC: 133 U/L (ref 35–104)
ALT SERPL-CCNC: 12 U/L (ref 5–33)
ANION GAP SERPL CALCULATED.3IONS-SCNC: 14 MMOL/L (ref 9–17)
AST SERPL-CCNC: 11 U/L
BASOPHILS # BLD: 1 % (ref 0–2)
BASOPHILS ABSOLUTE: 0.12 K/UL (ref 0–0.2)
BILIRUB SERPL-MCNC: 0.13 MG/DL (ref 0.3–1.2)
BUN BLDV-MCNC: 9 MG/DL (ref 6–20)
BUN/CREAT BLD: ABNORMAL (ref 9–20)
CALCIUM SERPL-MCNC: 10 MG/DL (ref 8.6–10.4)
CHLORIDE BLD-SCNC: 98 MMOL/L (ref 98–107)
CO2: 24 MMOL/L (ref 20–31)
CREAT SERPL-MCNC: 0.44 MG/DL (ref 0.5–0.9)
DIFFERENTIAL TYPE: ABNORMAL
EOSINOPHILS RELATIVE PERCENT: 3 % (ref 1–4)
GFR AFRICAN AMERICAN: >60 ML/MIN
GFR NON-AFRICAN AMERICAN: >60 ML/MIN
GFR SERPL CREATININE-BSD FRML MDRD: ABNORMAL ML/MIN/{1.73_M2}
GFR SERPL CREATININE-BSD FRML MDRD: ABNORMAL ML/MIN/{1.73_M2}
GLUCOSE BLD-MCNC: 119 MG/DL (ref 70–99)
HCT VFR BLD CALC: 34.6 % (ref 36–46)
HEMOGLOBIN: 10.6 G/DL (ref 12–16)
IMMATURE GRANULOCYTES: ABNORMAL %
LYMPHOCYTES # BLD: 16 % (ref 24–44)
MCH RBC QN AUTO: 24.5 PG (ref 26–34)
MCHC RBC AUTO-ENTMCNC: 30.7 G/DL (ref 31–37)
MCV RBC AUTO: 79.6 FL (ref 80–100)
MONOCYTES # BLD: 7 % (ref 1–7)
MORPHOLOGY: ABNORMAL
MORPHOLOGY: ABNORMAL
NRBC AUTOMATED: ABNORMAL PER 100 WBC
PDW BLD-RTO: 22 % (ref 12.5–15.4)
PLATELET # BLD: 1018 K/UL (ref 140–450)
PLATELET ESTIMATE: ABNORMAL
PMV BLD AUTO: 6.7 FL (ref 6–12)
POTASSIUM SERPL-SCNC: 4.3 MMOL/L (ref 3.7–5.3)
RBC # BLD: 4.34 M/UL (ref 4–5.2)
RBC # BLD: ABNORMAL 10*6/UL
SEG NEUTROPHILS: 73 % (ref 36–66)
SEGMENTED NEUTROPHILS ABSOLUTE COUNT: 8.98 K/UL (ref 1.8–7.7)
SODIUM BLD-SCNC: 136 MMOL/L (ref 135–144)
TOTAL PROTEIN: 8.1 G/DL (ref 6.4–8.3)
WBC # BLD: 12.3 K/UL (ref 3.5–11)
WBC # BLD: ABNORMAL 10*3/UL

## 2020-11-06 PROCEDURE — 80053 COMPREHEN METABOLIC PANEL: CPT

## 2020-11-06 PROCEDURE — 85025 COMPLETE CBC W/AUTO DIFF WBC: CPT

## 2020-11-06 PROCEDURE — 36415 COLL VENOUS BLD VENIPUNCTURE: CPT

## 2020-11-06 PROCEDURE — 36591 DRAW BLOOD OFF VENOUS DEVICE: CPT

## 2020-11-06 PROCEDURE — 99214 OFFICE O/P EST MOD 30 MIN: CPT | Performed by: INTERNAL MEDICINE

## 2020-11-06 PROCEDURE — 99211 OFF/OP EST MAY X REQ PHY/QHP: CPT | Performed by: INTERNAL MEDICINE

## 2020-11-13 ENCOUNTER — HOSPITAL ENCOUNTER (OUTPATIENT)
Dept: INFUSION THERAPY | Age: 55
Discharge: HOME | End: 2020-11-13
Payer: COMMERCIAL

## 2020-11-13 ENCOUNTER — TELEPHONE (OUTPATIENT)
Dept: ONCOLOGY | Age: 55
End: 2020-11-13

## 2020-11-13 VITALS
HEART RATE: 101 BPM | WEIGHT: 167.8 LBS | BODY MASS INDEX: 27.08 KG/M2 | TEMPERATURE: 97.7 F | RESPIRATION RATE: 18 BRPM | SYSTOLIC BLOOD PRESSURE: 119 MMHG | DIASTOLIC BLOOD PRESSURE: 80 MMHG

## 2020-11-13 DIAGNOSIS — C56.9 MALIGNANT NEOPLASM OF OVARY, UNSPECIFIED LATERALITY (HCC): Primary | ICD-10-CM

## 2020-11-13 LAB
ABSOLUTE EOS #: 0.13 K/UL (ref 0–0.4)
ABSOLUTE IMMATURE GRANULOCYTE: ABNORMAL K/UL (ref 0–0.3)
ABSOLUTE LYMPH #: 1.31 K/UL (ref 1–4.8)
ABSOLUTE MONO #: 0.92 K/UL (ref 0.1–1.2)
ALBUMIN SERPL-MCNC: 3.8 G/DL (ref 3.5–5.2)
ALBUMIN/GLOBULIN RATIO: 1 (ref 1–2.5)
ALP BLD-CCNC: 105 U/L (ref 35–104)
ALT SERPL-CCNC: 8 U/L (ref 5–33)
ANION GAP SERPL CALCULATED.3IONS-SCNC: 15 MMOL/L (ref 9–17)
AST SERPL-CCNC: 10 U/L
BASOPHILS # BLD: 2 % (ref 0–2)
BASOPHILS ABSOLUTE: 0.26 K/UL (ref 0–0.2)
BILIRUB SERPL-MCNC: <0.1 MG/DL (ref 0.3–1.2)
BUN BLDV-MCNC: 7 MG/DL (ref 6–20)
BUN/CREAT BLD: ABNORMAL (ref 9–20)
CALCIUM SERPL-MCNC: 9.6 MG/DL (ref 8.6–10.4)
CHLORIDE BLD-SCNC: 104 MMOL/L (ref 98–107)
CO2: 23 MMOL/L (ref 20–31)
CREAT SERPL-MCNC: 0.45 MG/DL (ref 0.5–0.9)
DIFFERENTIAL TYPE: ABNORMAL
EOSINOPHILS RELATIVE PERCENT: 1 % (ref 1–4)
GFR AFRICAN AMERICAN: >60 ML/MIN
GFR NON-AFRICAN AMERICAN: >60 ML/MIN
GFR SERPL CREATININE-BSD FRML MDRD: ABNORMAL ML/MIN/{1.73_M2}
GFR SERPL CREATININE-BSD FRML MDRD: ABNORMAL ML/MIN/{1.73_M2}
GLUCOSE BLD-MCNC: 190 MG/DL (ref 70–99)
HCT VFR BLD CALC: 34.4 % (ref 36–46)
HEMOGLOBIN: 10.9 G/DL (ref 12–16)
IMMATURE GRANULOCYTES: ABNORMAL %
LYMPHOCYTES # BLD: 10 % (ref 24–44)
MCH RBC QN AUTO: 24.7 PG (ref 26–34)
MCHC RBC AUTO-ENTMCNC: 31.8 G/DL (ref 31–37)
MCV RBC AUTO: 77.8 FL (ref 80–100)
MONOCYTES # BLD: 7 % (ref 2–11)
MORPHOLOGY: ABNORMAL
NRBC AUTOMATED: ABNORMAL PER 100 WBC
PDW BLD-RTO: 22.8 % (ref 12.5–15.4)
PLATELET # BLD: 937 K/UL (ref 140–450)
PLATELET ESTIMATE: ABNORMAL
PMV BLD AUTO: 6.5 FL (ref 6–12)
POTASSIUM SERPL-SCNC: 3.8 MMOL/L (ref 3.7–5.3)
RBC # BLD: 4.42 M/UL (ref 4–5.2)
RBC # BLD: ABNORMAL 10*6/UL
SEG NEUTROPHILS: 80 % (ref 36–66)
SEGMENTED NEUTROPHILS ABSOLUTE COUNT: 10.48 K/UL (ref 1.8–7.7)
SODIUM BLD-SCNC: 142 MMOL/L (ref 135–144)
TOTAL PROTEIN: 7.6 G/DL (ref 6.4–8.3)
WBC # BLD: 13.1 K/UL (ref 3.5–11)
WBC # BLD: ABNORMAL 10*3/UL

## 2020-11-13 PROCEDURE — 85025 COMPLETE CBC W/AUTO DIFF WBC: CPT

## 2020-11-13 PROCEDURE — 96375 TX/PRO/DX INJ NEW DRUG ADDON: CPT

## 2020-11-13 PROCEDURE — 36415 COLL VENOUS BLD VENIPUNCTURE: CPT

## 2020-11-13 PROCEDURE — 80053 COMPREHEN METABOLIC PANEL: CPT

## 2020-11-13 PROCEDURE — 2580000003 HC RX 258: Performed by: INTERNAL MEDICINE

## 2020-11-13 PROCEDURE — 6360000002 HC RX W HCPCS: Performed by: INTERNAL MEDICINE

## 2020-11-13 PROCEDURE — 96413 CHEMO IV INFUSION 1 HR: CPT

## 2020-11-13 RX ORDER — DEXTROSE MONOHYDRATE 50 MG/ML
INJECTION, SOLUTION INTRAVENOUS ONCE
Status: COMPLETED | OUTPATIENT
Start: 2020-11-13 | End: 2020-11-13

## 2020-11-13 RX ORDER — DEXAMETHASONE SODIUM PHOSPHATE 10 MG/ML
8 INJECTION, SOLUTION INTRAMUSCULAR; INTRAVENOUS ONCE
Status: COMPLETED | OUTPATIENT
Start: 2020-11-13 | End: 2020-11-13

## 2020-11-13 RX ORDER — SODIUM CHLORIDE 0.9 % (FLUSH) 0.9 %
10 SYRINGE (ML) INJECTION PRN
Status: DISCONTINUED | OUTPATIENT
Start: 2020-11-13 | End: 2020-11-14 | Stop reason: HOSPADM

## 2020-11-13 RX ORDER — HEPARIN SODIUM (PORCINE) LOCK FLUSH IV SOLN 100 UNIT/ML 100 UNIT/ML
500 SOLUTION INTRAVENOUS PRN
Status: DISCONTINUED | OUTPATIENT
Start: 2020-11-13 | End: 2020-11-14 | Stop reason: HOSPADM

## 2020-11-13 RX ADMIN — Medication 10 ML: at 10:20

## 2020-11-13 RX ADMIN — WATER: 1 INJECTION INTRAMUSCULAR; INTRAVENOUS; SUBCUTANEOUS at 10:28

## 2020-11-13 RX ADMIN — DOXORUBICIN HYDROCHLORIDE 76 MG: 2 INJECTABLE, LIPOSOMAL INTRAVENOUS at 12:56

## 2020-11-13 RX ADMIN — DEXAMETHASONE SODIUM PHOSPHATE 8 MG: 10 INJECTION, SOLUTION INTRAMUSCULAR; INTRAVENOUS at 12:35

## 2020-11-13 RX ADMIN — Medication: at 14:03

## 2020-11-13 RX ADMIN — Medication 10 ML: at 11:24

## 2020-11-13 RX ADMIN — ALTEPLASE 2 MG: 2.2 INJECTION, POWDER, LYOPHILIZED, FOR SOLUTION INTRAVENOUS at 10:27

## 2020-11-13 RX ADMIN — HEPARIN 500 UNITS: 100 SYRINGE at 14:03

## 2020-11-13 RX ADMIN — Medication 10 ML: at 10:21

## 2020-11-13 RX ADMIN — DEXTROSE MONOHYDRATE: 50 INJECTION, SOLUTION INTRAVENOUS at 12:35

## 2020-11-13 RX ADMIN — Medication 10 ML: at 10:23

## 2020-11-13 NOTE — PROGRESS NOTES
Patient here for Doxil C2D1. Scant blood return noted Lab called to draw blood. Actavase instilled for 60 minutes. Blood return noted. Labs reviewed. She tolerated treatment well and was discharged home in stable condition. She is due to return 12/11 for MD and C3D1.

## 2020-11-13 NOTE — TELEPHONE ENCOUNTER
Disability paperwork completed and signed by Dr. Elodia Rush. Pt requesting to take paperwork and fax on her own. Copies scanned into chart and original given to pt.

## 2020-11-15 NOTE — PROGRESS NOTES
_      Chief Complaint   Patient presents with    Follow-up     review status of disease    Follow-Up from Hospital     Patient was in [facility]     DIAGNOSIS:       Recurrent ovarian cancer. Original diagnosis 2005 with multiple recurrences. Diffuse metastasis. CURRENT THERAPY:         Doxil/ Avastin started 9/18/2020. Avastin will be discontinued due to recent GI bleeding. Status post recent vascular embolization  S/p seizure disorder. BRIEF CASE HISTORY:      Ms. Bill Gomez is a very pleasant 60 y.o. female with history of multiple co morbidities as listed. The patient seen in consultation for ovarian cancer with multiple recurrences. 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 She had surgical debulking and she had systemic treatment with Taxol and carboplatin for 6 cycles. Patient was in remission for about 2 years. She had a relapse in 2007 and treated with topotecan with good results. Patient relapsed again in 2008 and was treated with Taxol carboplatin. After 3 cycles of systemic chemotherapy she had problems with carboplatin so she finished 3 more cycles with Taxol. She did well again for 2 to 3 years until she had a relapse in 2012 and she had intraperitoneal chemotherapy treatment with Taxol. Patient was last seen by her oncologist in 2014 at which time she had relatively stable disease with normal tumor marker and no significant abnormal images. Patient moved to [place] after that and she was lost for oncology follow-ups. Patient was recently evaluated again here in [facility] because of abdominal discomfort. Re imaging confirmed metastatic relapse. Biopsy confirmed ovarian cancer recurrence. Scan showed extensive intraabdominal and splenic involvement and lung mets. She has no symptoms at the present time.    Patient denies fatigue. No unanticipated weight loss or decreased appetite. No fever or chills. · Eyes: No blurred vision, eye pain or double vision. · Ears: No hearing problems or drainage. No tinnitus. · Throat: No sore throat, problems with swallowing or dysphagia. · Respiratory: No cough, sputum or hemoptysis. No shortness of breath. No pleuritic chest pain. · Cardiovascular: No chest pain, orthopnea or PND. No lower extremity edema. No palpitation. · Gastrointestinal: As above. · Genitourinary: No dysuria, hematuria, frequency or urgency. · Musculoskeletal: No muscle aches or pains. No limitation of movement. No back pain. No gait disturbance, No joint complaints. · Dermatologic: No skin rashes or pruritus. No skin lesions or discolorations. · Psychiatric: No depression, anxiety, or stress or signs of schizophrenia. No change in mood or affect. · Hematologic: No history of bleeding tendency. No bruises or ecchymosis. No history of clotting problems. · Infectious disease: No fever, chills or frequent infections. · Endocrine: No polydipsia or polyuria. No temperature intolerance. · Neurologic: No headaches or dizziness. No weakness or numbness of the extremities. No changes in balance, coordination,  memory, mentation, behavior. · Allergic/Immunologic: No nasal congestion or hives. No repeated infections. PHYSICAL EXAM:  The patient is not in acute distress. Vital signs: Blood pressure 122/68, pulse 106, temperature 98.4 °F (36.9 °C), temperature source Oral, weight 168 lb 11.2 oz (76.5 kg).      General appearance - well appearing, not in pain or distress  Mental status - good mood, alert and oriented  Eyes - pupils equal and reactive, extraocular eye movements intact  Ears - bilateral TM's and external ear canals normal  Nose - normal and patent, no erythema, discharge or polyps  Mouth - mucous membranes moist, pharynx normal without lesions  Neck - supple, no significant adenopathy  Lymphatics - no palpable lymphadenopathy, no hepatosplenomegaly  Chest - clear to auscultation, no wheezes, rales or rhonchi, symmetric air entry  Heart - normal rate, regular rhythm, normal S1, S2, no murmurs, rubs, clicks or gallops  Abdomen - soft, nontender, nondistended, no masses or organomegaly  Neurological - alert, oriented, normal speech, no focal findings or movement disorder noted  Musculoskeletal - no joint tenderness, deformity or swelling  Extremities - peripheral pulses normal, no pedal edema, no clubbing or cyanosis  Skin - normal coloration and turgor, no rashes, no suspicious skin lesions noted     Review of Diagnostic data:   Lab Results   Component Value Date    WBC 13.1 (H) 11/13/2020    HGB 10.9 (L) 11/13/2020    HCT 34.4 (L) 11/13/2020    MCV 77.8 (L) 11/13/2020     (H) 11/13/2020       Chemistry        Component Value Date/Time     11/13/2020 1115    K 3.8 11/13/2020 1115     11/13/2020 1115    CO2 23 11/13/2020 1115    BUN 7 11/13/2020 1115    CREATININE 0.45 (L) 11/13/2020 1115        Component Value Date/Time    CALCIUM 9.6 11/13/2020 1115    ALKPHOS 105 (H) 11/13/2020 1115    AST 10 11/13/2020 1115    ALT 8 11/13/2020 1115    BILITOT <0.10 (L) 11/13/2020 1115          Lab Results   Component Value Date     237 (H) 10/24/2020         IMPRESSION:   Recurrent ovarian cancer. Original diagnosis 2005 with multiple recurrences. Diffuse metastasis. Recent active intra-abdominal bleeding due to splenic mass with GI infiltration. Status post embolization  S/p seizure disorder. PLAN:   Discussed palliative treatment. Different regimens were discussed. We prescribed Doxil as single agent but insurance declined. Patient was started on Doxil and Avastin. She tolerated treatment fairly well. However due to recent episode of intra-abdominal bleeding we will discontinue Avastin. Next treatment will be with the use of Doxil.   Patient had embolization by vascular. Reccent hospitalization for seizure. No brain mets. Seizures controlled. We discussed resuming treatment. We will not resume Avastin but we will go ahead with Doxil. We will resume treatment next week. Monitor closely for any signs of bleeding. Will transfuse blood as needed. Monitor toxicity and response to treatment  Patient's questions were answered to the best of her satisfaction and she verbalized full understanding and agreement.

## 2020-12-07 ENCOUNTER — TELEPHONE (OUTPATIENT)
Dept: ONCOLOGY | Age: 55
End: 2020-12-07

## 2020-12-07 NOTE — TELEPHONE ENCOUNTER
[facility]   Hereditary Cancer Risk Assessment     Name: Marcial PATEL [facility]  YOB: 1965  Date of Results Disclosure: 12/07/20      HISTORY   [facility] was seen for genetic counseling on 11/6/20 at the request of Ashwini Zhou MD due to her personal history of ovarian cancer. At that time, Ms. Garcia chose to pursue genetic testing via the Cancer[employer] Expanded + RNA gene panel. These results were discussed with Ms. Garcia on 12/07/20 via telephone. A summary of Ms. Garcia's results and recommendations are below. RESULTS  [employer] CancerNext-Expanded Panel + RNAinsight: NEGATIVE - NO CLINICALLY SIGNIFICANT MUTATIONS DETECTED   This panel included the analysis of 77 genes associated with hereditary cancer including: AIP, ALK, APC, AILYN, BAP1, BARD1, BLM, BMPR1A, BRCA1, BRCA2, BRIP1, CDC73, CDH1, CDK4, CDKN1B, CDKN2A, CHEK2, CTNNA1, DICER1, EGFR, EGLN1, EPCAM, FANCC, FH, FLCN, GALNT12, GREM1, HOXB13, KIF1B, KIT1, LZTR1, MAX, MEN1, MET, MITF, MLH1, MSH2, MSH3, MSH6, MUTYH, NBN, NF1, NF2, NTHL1, PALB2, PDGFRA, PHOX2B, PMS2, POLD1, POLE, POT1, EPWXD3F, PTCH1, PTEN, RAD51C, RAD51D, RB1, RECQL, RET, SDHA, SDHAF2, SDHB, SDHC, ,SDHD, SMAD4, SMARCA4, SMARCB1, SMARCE1, STK11, SUFU, JHNM281, TP53, TSC1, TSC2, VHL, and XRCC2. In addition, no clinically relevant aberrant RNA transcripts were detected in select genes. Please refer to genetic test report for technical details. We discussed that Ms. Garcia's negative test result greatly reduces the likelihood that her personal history of cancer is due to a hereditary mutation. It is possible that her personal history of cancer may be due to a gene for which testing was not performed or which has yet to be discovered. RECOMMENDATIONS  1) The outcome of Ms. Riveros genetic test results do not affect her current cancer treatment.  Ms. [facility] should continue cancer treatment and surveillance as directed by her physicians. 2) Ms. Garcia should continue general population cancer screening guidelines as directed by her physicians. RECOMMENDATIONS FOR FAMILY MEMBERS   1) Genetic testing is not recommended for Ms. Garcia's children based on her negative test results. However, this recommendation does not take into consideration any family history of cancer in their paternal family. Additionally, there is a low likelihood that the other cancers in Ms. Garcia's family are caused by a hereditary gene mutation. Therefore, based on the family history information provided by Ms. Garcia, genetic testing is not recommended for other family members at this time. 2) We encourage Ms. Garcia's relatives to discuss their family history of cancer with their physicians to determine the most appropriate cancer screening recommendations. SUMMARY & PLAN   1) Ms. Riveros genetic test results are negative meaning there were no clinically significant mutations detected in the 77 genes analyzed. 2) There are no changes in medical management for Ms. Garcia based on her negative genetic test results. 3) We encourage Ms. Garcia to contact us every 1-2 years to determine if there are any new genetic testing or research options available. 4) We encourage Ms. Garcia to contact us with updates to her personal and/or familys cancer history as this information may alter our assessment and/or recommendations. The [address] [place] [address] National Program would be glad to offer our assistance should you have any questions or concerns about this information. Please feel free to contact us at [phone]. Betty Reynaga.  Kobi Moreno MS, [facility]   Licensed Genetic Counselor         CC:  MD Jelena Lubin MD

## 2020-12-11 ENCOUNTER — TELEPHONE (OUTPATIENT)
Dept: ONCOLOGY | Age: 55
End: 2020-12-11

## 2020-12-11 ENCOUNTER — HOSPITAL ENCOUNTER (OUTPATIENT)
Dept: INFUSION THERAPY | Age: 55
End: 2020-12-11
Payer: COMMERCIAL

## 2020-12-11 ENCOUNTER — TELEPHONE (OUTPATIENT)
Dept: INFUSION THERAPY | Age: 55
End: 2020-12-11

## 2020-12-11 NOTE — TELEPHONE ENCOUNTER
Call from daughter to cancel today's tx and md visit as Ray Fearing is ill  Wants to make appt for next week  appts moved per request

## 2020-12-11 NOTE — TELEPHONE ENCOUNTER
Called and LVM for pt who missed both Dr and chemo appts  This am   LVM to call back and reschedule   Chuck Oglesby RN

## 2020-12-18 ENCOUNTER — HOSPITAL ENCOUNTER (OUTPATIENT)
Dept: INFUSION THERAPY | Age: 55
End: 2020-12-18
Payer: COMMERCIAL

## 2020-12-18 ENCOUNTER — TELEPHONE (OUTPATIENT)
Dept: INFUSION THERAPY | Age: 55
End: 2020-12-18

## 2020-12-21 ENCOUNTER — TELEPHONE (OUTPATIENT)
Dept: ONCOLOGY | Age: 55
End: 2020-12-21

## 2020-12-21 NOTE — TELEPHONE ENCOUNTER
Name: Loletta Boxer [facility]  : 1965  MRN: [id number]    Oncology Navigation Follow-Up Note    Contact Type:  Telephone  Notes: Upon review of chart noted pt no show for 2020 Dr. Danae New & tx, noted pt's daughter called stating pt ill & requesting rescheduled 2020. Noted pt no show for 2020 & [address] infusion nurse attempted to contact, VM left. Attempted to contact pt, no answer, VM left requesting call writer. Attempted to contact pt's daughter, Larry Garcia, no answer, VM left inquired on pt & requested call writer back. Will continue to follow.     Electronically signed by Tyra Shaikh RN on 2020 at 3:38 PM

## 2020-12-30 ENCOUNTER — TELEPHONE (OUTPATIENT)
Dept: ONCOLOGY | Age: 55
End: 2020-12-30

## 2020-12-30 RX ORDER — OXYCODONE AND ACETAMINOPHEN 10; 325 MG/1; MG/1
1 TABLET ORAL EVERY 4 HOURS
Qty: 42 TABLET | Refills: 0 | Status: ON HOLD | OUTPATIENT
Start: 2020-12-30 | End: 2021-01-13 | Stop reason: HOSPADM

## 2020-12-30 RX ORDER — OXYCODONE HCL 10 MG/1
10 TABLET, FILM COATED, EXTENDED RELEASE ORAL EVERY 12 HOURS
Qty: 14 TABLET | Refills: 0 | Status: ON HOLD | OUTPATIENT
Start: 2020-12-30 | End: 2021-01-13 | Stop reason: HOSPADM

## 2020-12-30 NOTE — TELEPHONE ENCOUNTER
Name: Vicky Sarah [facility]  : 1965  MRN: [id number]    Oncology Navigation Follow-Up Note    Contact Type:  Telephone  Notes: Attempted to contact pt for f/u call, no answer, VM left requested pt call writer back. [employer] message sent to [facility] , notified pt recently admitted @ [facility] & requested obtain records. Will continue to follow.     Electronically signed by Car Diaz RN on 2020 at 10:27 AM

## 2021-01-04 ENCOUNTER — APPOINTMENT (OUTPATIENT)
Dept: GENERAL RADIOLOGY | Facility: CLINIC | Age: 56
End: 2021-01-04
Payer: COMMERCIAL

## 2021-01-04 ENCOUNTER — APPOINTMENT (OUTPATIENT)
Dept: CT IMAGING | Facility: CLINIC | Age: 56
End: 2021-01-04
Payer: COMMERCIAL

## 2021-01-04 ENCOUNTER — APPOINTMENT (OUTPATIENT)
Dept: GENERAL RADIOLOGY | Age: 56
DRG: 871 | End: 2021-01-04
Payer: COMMERCIAL

## 2021-01-04 ENCOUNTER — HOSPITAL ENCOUNTER (INPATIENT)
Age: 56
LOS: 10 days | Discharge: HOME HEALTH | DRG: 871 | End: 2021-01-14
Attending: EMERGENCY MEDICINE | Admitting: INTERNAL MEDICINE
Payer: COMMERCIAL

## 2021-01-04 ENCOUNTER — TELEPHONE (OUTPATIENT)
Dept: ONCOLOGY | Age: 56
End: 2021-01-04

## 2021-01-04 ENCOUNTER — HOSPITAL ENCOUNTER (EMERGENCY)
Facility: CLINIC | Age: 56
Discharge: ANOTHER HOSPITAL | End: 2021-01-04
Attending: EMERGENCY MEDICINE
Payer: COMMERCIAL

## 2021-01-04 VITALS
WEIGHT: 165 LBS | SYSTOLIC BLOOD PRESSURE: 154 MMHG | TEMPERATURE: 97.5 F | RESPIRATION RATE: 12 BRPM | OXYGEN SATURATION: 100 % | BODY MASS INDEX: 29.23 KG/M2 | HEIGHT: 63 IN | HEART RATE: 87 BPM | DIASTOLIC BLOOD PRESSURE: 81 MMHG

## 2021-01-04 DIAGNOSIS — R56.9 SEIZURE (HCC): Primary | ICD-10-CM

## 2021-01-04 DIAGNOSIS — G40.901 STATUS EPILEPTICUS (HCC): Primary | ICD-10-CM

## 2021-01-04 DIAGNOSIS — R77.8 ELEVATED TROPONIN: ICD-10-CM

## 2021-01-04 DIAGNOSIS — R73.9 HYPERGLYCEMIA: ICD-10-CM

## 2021-01-04 DIAGNOSIS — R40.20 COMA, UNSPECIFIED COMA DEPTH (HCC): ICD-10-CM

## 2021-01-04 DIAGNOSIS — D73.3 SPLEEN, ABSCESS: ICD-10-CM

## 2021-01-04 DIAGNOSIS — R79.89 ELEVATED LACTIC ACID LEVEL: ICD-10-CM

## 2021-01-04 DIAGNOSIS — D72.829 LEUKOCYTOSIS, UNSPECIFIED TYPE: ICD-10-CM

## 2021-01-04 DIAGNOSIS — D73.3 SPLENIC ABSCESS: ICD-10-CM

## 2021-01-04 DIAGNOSIS — D73.89 SPLENIC LESION: ICD-10-CM

## 2021-01-04 LAB
ABSOLUTE EOS #: 0 K/UL (ref 0–0.4)
ABSOLUTE EOS #: 0.32 K/UL (ref 0–0.4)
ABSOLUTE IMMATURE GRANULOCYTE: 0 K/UL (ref 0–0.3)
ABSOLUTE IMMATURE GRANULOCYTE: ABNORMAL K/UL (ref 0–0.3)
ABSOLUTE LYMPH #: 0.94 K/UL (ref 1–4.8)
ABSOLUTE LYMPH #: 1.93 K/UL (ref 1–4.8)
ABSOLUTE MONO #: 0 K/UL (ref 0.1–0.8)
ABSOLUTE MONO #: 0.32 K/UL (ref 0.1–0.8)
ALBUMIN SERPL-MCNC: 3.1 G/DL (ref 3.5–5.2)
ALBUMIN/GLOBULIN RATIO: 0.8 (ref 1–2.5)
ALLEN TEST: ABNORMAL
ALP BLD-CCNC: 93 U/L (ref 35–104)
ALT SERPL-CCNC: <5 U/L (ref 5–33)
AMMONIA: 110 UMOL/L (ref 11–51)
ANION GAP SERPL CALCULATED.3IONS-SCNC: 16 MMOL/L (ref 9–17)
AST SERPL-CCNC: 24 U/L
BASOPHILS # BLD: 0 % (ref 0–2)
BASOPHILS # BLD: 1 % (ref 0–2)
BASOPHILS ABSOLUTE: 0 K/UL (ref 0–0.2)
BASOPHILS ABSOLUTE: 0.31 K/UL (ref 0–0.2)
BILIRUB SERPL-MCNC: 0.2 MG/DL (ref 0.3–1.2)
BILIRUBIN DIRECT: <0.08 MG/DL
BILIRUBIN URINE: NEGATIVE
BILIRUBIN URINE: NEGATIVE
BILIRUBIN, INDIRECT: ABNORMAL MG/DL (ref 0–1)
BUN BLDV-MCNC: 5 MG/DL (ref 6–20)
BUN/CREAT BLD: ABNORMAL (ref 9–20)
CALCIUM SERPL-MCNC: 8.7 MG/DL (ref 8.6–10.4)
CHLORIDE BLD-SCNC: 102 MMOL/L (ref 98–107)
CO2: 20 MMOL/L (ref 20–31)
COLOR: YELLOW
COLOR: YELLOW
COMMENT UA: ABNORMAL
COMMENT UA: NORMAL
CREAT SERPL-MCNC: 0.5 MG/DL (ref 0.5–0.9)
DIFFERENTIAL TYPE: ABNORMAL
DIFFERENTIAL TYPE: ABNORMAL
EOSINOPHILS RELATIVE PERCENT: 0 % (ref 1–4)
EOSINOPHILS RELATIVE PERCENT: 1 % (ref 1–4)
FIO2: 60
GFR AFRICAN AMERICAN: >60 ML/MIN
GFR NON-AFRICAN AMERICAN: >60 ML/MIN
GFR SERPL CREATININE-BSD FRML MDRD: ABNORMAL ML/MIN/{1.73_M2}
GFR SERPL CREATININE-BSD FRML MDRD: ABNORMAL ML/MIN/{1.73_M2}
GLOBULIN: ABNORMAL G/DL (ref 1.5–3.8)
GLUCOSE BLD-MCNC: 361 MG/DL (ref 70–99)
GLUCOSE URINE: ABNORMAL
GLUCOSE URINE: NEGATIVE
HCT VFR BLD CALC: 35.3 % (ref 36–46)
HCT VFR BLD CALC: 37.1 % (ref 36.3–47.1)
HEMOGLOBIN: 10.5 G/DL (ref 12–16)
HEMOGLOBIN: 10.7 G/DL (ref 11.9–15.1)
IMMATURE GRANULOCYTES: 0 %
IMMATURE GRANULOCYTES: ABNORMAL %
KEPPRA: 13 UG/ML
KETONES, URINE: ABNORMAL
KETONES, URINE: NEGATIVE
LACTATE DEHYDROGENASE: 212 U/L (ref 135–214)
LACTIC ACID, SEPSIS WHOLE BLOOD: 1.2 MMOL/L (ref 0.5–1.9)
LACTIC ACID, SEPSIS WHOLE BLOOD: ABNORMAL MMOL/L (ref 0.5–1.9)
LACTIC ACID, SEPSIS: 8.8 MMOL/L (ref 0.5–1.9)
LACTIC ACID, SEPSIS: NORMAL MMOL/L (ref 0.5–1.9)
LACTIC ACID, WHOLE BLOOD: 1.2 MMOL/L (ref 0.7–2.1)
LACTIC ACID: NORMAL MMOL/L
LEUKOCYTE ESTERASE, URINE: NEGATIVE
LEUKOCYTE ESTERASE, URINE: NEGATIVE
LYMPHOCYTES # BLD: 3 % (ref 24–44)
LYMPHOCYTES # BLD: 6 % (ref 24–44)
MCH RBC QN AUTO: 22.5 PG (ref 26–34)
MCH RBC QN AUTO: 22.9 PG (ref 25.2–33.5)
MCHC RBC AUTO-ENTMCNC: 28.8 G/DL (ref 28.4–34.8)
MCHC RBC AUTO-ENTMCNC: 29.6 G/DL (ref 31–37)
MCV RBC AUTO: 76 FL (ref 80–100)
MCV RBC AUTO: 79.3 FL (ref 82.6–102.9)
MODE: ABNORMAL
MONOCYTES # BLD: 0 % (ref 1–7)
MONOCYTES # BLD: 1 % (ref 1–7)
MORPHOLOGY: ABNORMAL
NEGATIVE BASE EXCESS, ART: ABNORMAL (ref 0–2)
NITRITE, URINE: NEGATIVE
NITRITE, URINE: NEGATIVE
NRBC AUTOMATED: 0 PER 100 WBC
NRBC AUTOMATED: ABNORMAL PER 100 WBC
O2 DEVICE/FLOW/%: ABNORMAL
PATIENT TEMP: ABNORMAL
PDW BLD-RTO: 24.9 % (ref 11.8–14.4)
PDW BLD-RTO: 25.3 % (ref 12.5–15.4)
PH UA: 6.5 (ref 5–8)
PH UA: 7.5 (ref 5–8)
PLATELET # BLD: 1142 K/UL (ref 140–450)
PLATELET # BLD: 956 K/UL (ref 138–453)
PLATELET ESTIMATE: ABNORMAL
PLATELET ESTIMATE: ABNORMAL
PMV BLD AUTO: 6.3 FL (ref 6–12)
PMV BLD AUTO: 8.3 FL (ref 8.1–13.5)
POC HCO3: 27.9 MMOL/L (ref 21–28)
POC O2 SATURATION: 100 % (ref 94–98)
POC PCO2 TEMP: ABNORMAL MM HG
POC PCO2: 41.4 MM HG (ref 35–48)
POC PH TEMP: ABNORMAL
POC PH: 7.44 (ref 7.35–7.45)
POC PO2 TEMP: ABNORMAL MM HG
POC PO2: 224.6 MM HG (ref 83–108)
POSITIVE BASE EXCESS, ART: 3 (ref 0–3)
POTASSIUM SERPL-SCNC: 3.5 MMOL/L (ref 3.7–5.3)
PROCALCITONIN: 0.85 NG/ML
PROTEIN UA: NEGATIVE
PROTEIN UA: NEGATIVE
RBC # BLD: 4.65 M/UL (ref 4–5.2)
RBC # BLD: 4.68 M/UL (ref 3.95–5.11)
RBC # BLD: ABNORMAL 10*6/UL
RBC # BLD: ABNORMAL 10*6/UL
SAMPLE SITE: ABNORMAL
SARS-COV-2, RAPID: NOT DETECTED
SARS-COV-2: NORMAL
SARS-COV-2: NORMAL
SEG NEUTROPHILS: 92 % (ref 36–66)
SEG NEUTROPHILS: 96 % (ref 36–66)
SEGMENTED NEUTROPHILS ABSOLUTE COUNT: 29.63 K/UL (ref 1.8–7.7)
SEGMENTED NEUTROPHILS ABSOLUTE COUNT: 30.15 K/UL (ref 1.8–7.7)
SODIUM BLD-SCNC: 138 MMOL/L (ref 135–144)
SOURCE: NORMAL
SPECIFIC GRAVITY UA: 1.01 (ref 1–1.03)
SPECIFIC GRAVITY UA: 1.02 (ref 1–1.03)
TCO2 (CALC), ART: 29 MMOL/L (ref 22–29)
TOTAL PROTEIN: 7 G/DL (ref 6.4–8.3)
TROPONIN INTERP: ABNORMAL
TROPONIN T: ABNORMAL NG/ML
TROPONIN, HIGH SENSITIVITY: 139 NG/L (ref 0–14)
TROPONIN, HIGH SENSITIVITY: 159 NG/L (ref 0–14)
TROPONIN, HIGH SENSITIVITY: 161 NG/L (ref 0–14)
TROPONIN, HIGH SENSITIVITY: 44 NG/L (ref 0–14)
TURBIDITY: CLEAR
TURBIDITY: CLEAR
URINE HGB: NEGATIVE
URINE HGB: NEGATIVE
UROBILINOGEN, URINE: NORMAL
UROBILINOGEN, URINE: NORMAL
WBC # BLD: 31.4 K/UL (ref 3.5–11.3)
WBC # BLD: 32.2 K/UL (ref 3.5–11)
WBC # BLD: ABNORMAL 10*3/UL
WBC # BLD: ABNORMAL 10*3/UL

## 2021-01-04 PROCEDURE — 2500000003 HC RX 250 WO HCPCS

## 2021-01-04 PROCEDURE — 87075 CULTR BACTERIA EXCEPT BLOOD: CPT

## 2021-01-04 PROCEDURE — 6360000002 HC RX W HCPCS: Performed by: NURSE PRACTITIONER

## 2021-01-04 PROCEDURE — 2000000003 HC NEURO ICU R&B

## 2021-01-04 PROCEDURE — 83605 ASSAY OF LACTIC ACID: CPT

## 2021-01-04 PROCEDURE — 5A1945Z RESPIRATORY VENTILATION, 24-96 CONSECUTIVE HOURS: ICD-10-PCS | Performed by: PSYCHIATRY & NEUROLOGY

## 2021-01-04 PROCEDURE — 87641 MR-STAPH DNA AMP PROBE: CPT

## 2021-01-04 PROCEDURE — 96375 TX/PRO/DX INJ NEW DRUG ADDON: CPT

## 2021-01-04 PROCEDURE — 81003 URINALYSIS AUTO W/O SCOPE: CPT

## 2021-01-04 PROCEDURE — 82803 BLOOD GASES ANY COMBINATION: CPT

## 2021-01-04 PROCEDURE — 99285 EMERGENCY DEPT VISIT HI MDM: CPT

## 2021-01-04 PROCEDURE — 88112 CYTOPATH CELL ENHANCE TECH: CPT

## 2021-01-04 PROCEDURE — 85025 COMPLETE CBC W/AUTO DIFF WBC: CPT

## 2021-01-04 PROCEDURE — 99254 IP/OBS CNSLTJ NEW/EST MOD 60: CPT | Performed by: INTERNAL MEDICINE

## 2021-01-04 PROCEDURE — 2580000003 HC RX 258: Performed by: NURSE PRACTITIONER

## 2021-01-04 PROCEDURE — 87070 CULTURE OTHR SPECIMN AEROBIC: CPT

## 2021-01-04 PROCEDURE — 6370000000 HC RX 637 (ALT 250 FOR IP): Performed by: NURSE PRACTITIONER

## 2021-01-04 PROCEDURE — 96365 THER/PROPH/DIAG IV INF INIT: CPT

## 2021-01-04 PROCEDURE — 74018 RADEX ABDOMEN 1 VIEW: CPT

## 2021-01-04 PROCEDURE — 95720 EEG PHY/QHP EA INCR W/VEEG: CPT | Performed by: PSYCHIATRY & NEUROLOGY

## 2021-01-04 PROCEDURE — 84145 PROCALCITONIN (PCT): CPT

## 2021-01-04 PROCEDURE — 95705 EEG W/O VID 2-12 HR UNMNTR: CPT

## 2021-01-04 PROCEDURE — 99292 CRITICAL CARE ADDL 30 MIN: CPT

## 2021-01-04 PROCEDURE — 82140 ASSAY OF AMMONIA: CPT

## 2021-01-04 PROCEDURE — 36415 COLL VENOUS BLD VENIPUNCTURE: CPT

## 2021-01-04 PROCEDURE — 94002 VENT MGMT INPAT INIT DAY: CPT

## 2021-01-04 PROCEDURE — 84484 ASSAY OF TROPONIN QUANT: CPT

## 2021-01-04 PROCEDURE — U0002 COVID-19 LAB TEST NON-CDC: HCPCS

## 2021-01-04 PROCEDURE — 2700000000 HC OXYGEN THERAPY PER DAY

## 2021-01-04 PROCEDURE — 6360000002 HC RX W HCPCS: Performed by: STUDENT IN AN ORGANIZED HEALTH CARE EDUCATION/TRAINING PROGRAM

## 2021-01-04 PROCEDURE — 94761 N-INVAS EAR/PLS OXIMETRY MLT: CPT

## 2021-01-04 PROCEDURE — 2500000003 HC RX 250 WO HCPCS: Performed by: EMERGENCY MEDICINE

## 2021-01-04 PROCEDURE — 2580000003 HC RX 258: Performed by: STUDENT IN AN ORGANIZED HEALTH CARE EDUCATION/TRAINING PROGRAM

## 2021-01-04 PROCEDURE — 83615 LACTATE (LD) (LDH) ENZYME: CPT

## 2021-01-04 PROCEDURE — 80076 HEPATIC FUNCTION PANEL: CPT

## 2021-01-04 PROCEDURE — 70450 CT HEAD/BRAIN W/O DYE: CPT

## 2021-01-04 PROCEDURE — 71045 X-RAY EXAM CHEST 1 VIEW: CPT

## 2021-01-04 PROCEDURE — 6360000002 HC RX W HCPCS: Performed by: EMERGENCY MEDICINE

## 2021-01-04 PROCEDURE — 88305 TISSUE EXAM BY PATHOLOGIST: CPT

## 2021-01-04 PROCEDURE — APPNB60 APP NON BILLABLE TIME 46-60 MINS: Performed by: NURSE PRACTITIONER

## 2021-01-04 PROCEDURE — 87205 SMEAR GRAM STAIN: CPT

## 2021-01-04 PROCEDURE — 93005 ELECTROCARDIOGRAM TRACING: CPT | Performed by: STUDENT IN AN ORGANIZED HEALTH CARE EDUCATION/TRAINING PROGRAM

## 2021-01-04 PROCEDURE — 2580000003 HC RX 258: Performed by: EMERGENCY MEDICINE

## 2021-01-04 PROCEDURE — 80048 BASIC METABOLIC PNL TOTAL CA: CPT

## 2021-01-04 PROCEDURE — 6360000002 HC RX W HCPCS

## 2021-01-04 PROCEDURE — 89051 BODY FLUID CELL COUNT: CPT

## 2021-01-04 PROCEDURE — 93005 ELECTROCARDIOGRAM TRACING: CPT | Performed by: EMERGENCY MEDICINE

## 2021-01-04 PROCEDURE — 80177 DRUG SCRN QUAN LEVETIRACETAM: CPT

## 2021-01-04 PROCEDURE — 99291 CRITICAL CARE FIRST HOUR: CPT

## 2021-01-04 PROCEDURE — C9113 INJ PANTOPRAZOLE SODIUM, VIA: HCPCS | Performed by: STUDENT IN AN ORGANIZED HEALTH CARE EDUCATION/TRAINING PROGRAM

## 2021-01-04 RX ORDER — ATORVASTATIN CALCIUM 20 MG/1
20 TABLET, FILM COATED ORAL DAILY
Status: DISCONTINUED | OUTPATIENT
Start: 2021-01-04 | End: 2021-01-12

## 2021-01-04 RX ORDER — HYDROCODONE BITARTRATE AND ACETAMINOPHEN 5; 325 MG/1; MG/1
1 TABLET ORAL EVERY 6 HOURS PRN
Status: DISCONTINUED | OUTPATIENT
Start: 2021-01-04 | End: 2021-01-08

## 2021-01-04 RX ORDER — PROMETHAZINE HYDROCHLORIDE 12.5 MG/1
12.5 TABLET ORAL EVERY 6 HOURS PRN
Status: DISCONTINUED | OUTPATIENT
Start: 2021-01-04 | End: 2021-01-14 | Stop reason: HOSPADM

## 2021-01-04 RX ORDER — SODIUM CHLORIDE 9 MG/ML
INJECTION, SOLUTION INTRAVENOUS CONTINUOUS
Status: DISCONTINUED | OUTPATIENT
Start: 2021-01-04 | End: 2021-01-12

## 2021-01-04 RX ORDER — ONDANSETRON 2 MG/ML
4 INJECTION INTRAMUSCULAR; INTRAVENOUS EVERY 6 HOURS PRN
Status: DISCONTINUED | OUTPATIENT
Start: 2021-01-04 | End: 2021-01-14 | Stop reason: HOSPADM

## 2021-01-04 RX ORDER — PANTOPRAZOLE SODIUM 40 MG/10ML
40 INJECTION, POWDER, LYOPHILIZED, FOR SOLUTION INTRAVENOUS 2 TIMES DAILY
Status: DISCONTINUED | OUTPATIENT
Start: 2021-01-04 | End: 2021-01-07

## 2021-01-04 RX ORDER — SUCCINYLCHOLINE/SOD CL,ISO/PF 100 MG/5ML
100 SYRINGE (ML) INTRAVENOUS ONCE
Status: COMPLETED | OUTPATIENT
Start: 2021-01-04 | End: 2021-01-04

## 2021-01-04 RX ORDER — PROPOFOL 10 MG/ML
INJECTION, EMULSION INTRAVENOUS
Status: COMPLETED
Start: 2021-01-04 | End: 2021-01-04

## 2021-01-04 RX ORDER — KETAMINE HCL IN NACL, ISO-OSM 100MG/10ML
SYRINGE (ML) INJECTION
Status: COMPLETED
Start: 2021-01-04 | End: 2021-01-04

## 2021-01-04 RX ORDER — LANOLIN ALCOHOL/MO/W.PET/CERES
325 CREAM (GRAM) TOPICAL
Status: DISCONTINUED | OUTPATIENT
Start: 2021-01-05 | End: 2021-01-14 | Stop reason: HOSPADM

## 2021-01-04 RX ORDER — ACETAMINOPHEN 325 MG/1
650 TABLET ORAL EVERY 6 HOURS PRN
Status: DISCONTINUED | OUTPATIENT
Start: 2021-01-04 | End: 2021-01-14 | Stop reason: HOSPADM

## 2021-01-04 RX ORDER — LEVETIRACETAM 15 MG/ML
1500 INJECTION INTRAVASCULAR EVERY 12 HOURS
Status: DISCONTINUED | OUTPATIENT
Start: 2021-01-05 | End: 2021-01-12

## 2021-01-04 RX ORDER — SODIUM CHLORIDE 0.9 % (FLUSH) 0.9 %
10 SYRINGE (ML) INJECTION EVERY 12 HOURS SCHEDULED
Status: DISCONTINUED | OUTPATIENT
Start: 2021-01-04 | End: 2021-01-14 | Stop reason: HOSPADM

## 2021-01-04 RX ORDER — KETAMINE HCL IN NACL, ISO-OSM 100MG/10ML
1 SYRINGE (ML) INJECTION ONCE
Status: COMPLETED | OUTPATIENT
Start: 2021-01-04 | End: 2021-01-04

## 2021-01-04 RX ORDER — PROPOFOL 10 MG/ML
10 INJECTION, EMULSION INTRAVENOUS
Status: DISCONTINUED | OUTPATIENT
Start: 2021-01-04 | End: 2021-01-07

## 2021-01-04 RX ORDER — POLYETHYLENE GLYCOL 3350 17 G/17G
17 POWDER, FOR SOLUTION ORAL DAILY PRN
Status: DISCONTINUED | OUTPATIENT
Start: 2021-01-04 | End: 2021-01-14 | Stop reason: HOSPADM

## 2021-01-04 RX ORDER — 0.9 % SODIUM CHLORIDE 0.9 %
1000 INTRAVENOUS SOLUTION INTRAVENOUS ONCE
Status: COMPLETED | OUTPATIENT
Start: 2021-01-04 | End: 2021-01-04

## 2021-01-04 RX ORDER — CHLORHEXIDINE GLUCONATE 0.12 MG/ML
15 RINSE ORAL 2 TIMES DAILY
Status: DISCONTINUED | OUTPATIENT
Start: 2021-01-04 | End: 2021-01-04

## 2021-01-04 RX ORDER — ACETAMINOPHEN 650 MG/1
650 SUPPOSITORY RECTAL EVERY 6 HOURS PRN
Status: DISCONTINUED | OUTPATIENT
Start: 2021-01-04 | End: 2021-01-14 | Stop reason: HOSPADM

## 2021-01-04 RX ORDER — KETAMINE HCL IN NACL, ISO-OSM 100MG/10ML
SYRINGE (ML) INJECTION
Status: DISCONTINUED
Start: 2021-01-04 | End: 2021-01-04 | Stop reason: HOSPADM

## 2021-01-04 RX ORDER — ETOMIDATE 2 MG/ML
0.3 INJECTION INTRAVENOUS ONCE
Status: COMPLETED | OUTPATIENT
Start: 2021-01-04 | End: 2021-01-04

## 2021-01-04 RX ORDER — SODIUM CHLORIDE, SODIUM LACTATE, POTASSIUM CHLORIDE, AND CALCIUM CHLORIDE .6; .31; .03; .02 G/100ML; G/100ML; G/100ML; G/100ML
500 INJECTION, SOLUTION INTRAVENOUS ONCE
Status: COMPLETED | OUTPATIENT
Start: 2021-01-04 | End: 2021-01-04

## 2021-01-04 RX ORDER — METFORMIN HYDROCHLORIDE 500 MG/1
TABLET, EXTENDED RELEASE ORAL
Qty: 30 TABLET | Refills: 0 | Status: SHIPPED | OUTPATIENT
Start: 2021-01-04 | End: 2021-01-25

## 2021-01-04 RX ORDER — SODIUM CHLORIDE 0.9 % (FLUSH) 0.9 %
10 SYRINGE (ML) INJECTION PRN
Status: DISCONTINUED | OUTPATIENT
Start: 2021-01-04 | End: 2021-01-14 | Stop reason: HOSPADM

## 2021-01-04 RX ORDER — LEVETIRACETAM 10 MG/ML
INJECTION INTRAVASCULAR
Status: COMPLETED
Start: 2021-01-04 | End: 2021-01-04

## 2021-01-04 RX ORDER — PROPOFOL 10 MG/ML
INJECTION, EMULSION INTRAVENOUS
Status: DISCONTINUED
Start: 2021-01-04 | End: 2021-01-04 | Stop reason: HOSPADM

## 2021-01-04 RX ORDER — PROPOFOL 10 MG/ML
10 INJECTION, EMULSION INTRAVENOUS ONCE
Status: COMPLETED | OUTPATIENT
Start: 2021-01-04 | End: 2021-01-04

## 2021-01-04 RX ORDER — SODIUM CHLORIDE 9 MG/ML
10 INJECTION INTRAVENOUS ONCE
Status: COMPLETED | OUTPATIENT
Start: 2021-01-04 | End: 2021-01-07

## 2021-01-04 RX ADMIN — VANCOMYCIN HYDROCHLORIDE 1250 MG: 10 INJECTION, POWDER, LYOPHILIZED, FOR SOLUTION INTRAVENOUS at 23:12

## 2021-01-04 RX ADMIN — PROPOFOL 50 MCG/KG/MIN: 10 INJECTION, EMULSION INTRAVENOUS at 16:09

## 2021-01-04 RX ADMIN — SODIUM CHLORIDE, POTASSIUM CHLORIDE, SODIUM LACTATE AND CALCIUM CHLORIDE 500 ML: 600; 310; 30; 20 INJECTION, SOLUTION INTRAVENOUS at 14:28

## 2021-01-04 RX ADMIN — LEVETIRACETAM 2250 MG: 100 INJECTION, SOLUTION INTRAVENOUS at 17:45

## 2021-01-04 RX ADMIN — SODIUM CHLORIDE, PRESERVATIVE FREE 10 ML: 5 INJECTION INTRAVENOUS at 22:03

## 2021-01-04 RX ADMIN — Medication 50 MG: at 14:06

## 2021-01-04 RX ADMIN — PROPOFOL 5 MCG/KG/MIN: 10 INJECTION, EMULSION INTRAVENOUS at 11:45

## 2021-01-04 RX ADMIN — SODIUM CHLORIDE 10 ML: 9 INJECTION, SOLUTION INTRAMUSCULAR; INTRAVENOUS; SUBCUTANEOUS at 23:35

## 2021-01-04 RX ADMIN — PANTOPRAZOLE SODIUM 40 MG: 40 INJECTION, POWDER, LYOPHILIZED, FOR SOLUTION INTRAVENOUS at 23:34

## 2021-01-04 RX ADMIN — SODIUM CHLORIDE 1000 ML: 9 INJECTION, SOLUTION INTRAVENOUS at 13:06

## 2021-01-04 RX ADMIN — SERTRALINE 50 MG: 50 TABLET, FILM COATED ORAL at 23:13

## 2021-01-04 RX ADMIN — PROPOFOL 70 MCG/KG/MIN: 10 INJECTION, EMULSION INTRAVENOUS at 23:09

## 2021-01-04 RX ADMIN — PROPOFOL 70 MCG/KG/MIN: 10 INJECTION, EMULSION INTRAVENOUS at 19:40

## 2021-01-04 RX ADMIN — Medication 100 MG: at 11:32

## 2021-01-04 RX ADMIN — Medication 74 MG: at 12:27

## 2021-01-04 RX ADMIN — VANCOMYCIN HYDROCHLORIDE 1000 MG: 1 INJECTION, POWDER, LYOPHILIZED, FOR SOLUTION INTRAVENOUS at 14:30

## 2021-01-04 RX ADMIN — SODIUM CHLORIDE: 9 INJECTION, SOLUTION INTRAVENOUS at 20:58

## 2021-01-04 RX ADMIN — PIPERACILLIN AND TAZOBACTAM 3.38 G: 3; .375 INJECTION, POWDER, FOR SOLUTION INTRAVENOUS at 22:01

## 2021-01-04 RX ADMIN — ETOMIDATE 22.4 MG: 2 INJECTION, SOLUTION INTRAVENOUS at 11:57

## 2021-01-04 RX ADMIN — SODIUM CHLORIDE 1000 ML: 9 INJECTION, SOLUTION INTRAVENOUS at 11:52

## 2021-01-04 RX ADMIN — ATORVASTATIN CALCIUM 20 MG: 20 TABLET, FILM COATED ORAL at 23:12

## 2021-01-04 RX ADMIN — PIPERACILLIN SODIUM AND TAZOBACTAM SODIUM 4.5 G: 4; .5 INJECTION, POWDER, LYOPHILIZED, FOR SOLUTION INTRAVENOUS at 13:19

## 2021-01-04 ASSESSMENT — PULMONARY FUNCTION TESTS
PIF_VALUE: 18
PIF_VALUE: 15

## 2021-01-04 NOTE — ED NOTES
Dr Salcedo Cons speaking to [facility] ER accepting Dr Mary Walsh, RN  01/04/21 [phone]

## 2021-01-04 NOTE — TELEPHONE ENCOUNTER
PA was denied. [employer] states preferred drug needs to be used first Malka Colbert ER capsule sprinkle, morphine ER tablet,extended release, or fentanyl transdermal patch). Called pharmacy and they states [address] cannot be used for this drug, and cash price is $75. They will place the RX on hold. Pt currently in the hospital now so will address if needed when she is d/c'd.

## 2021-01-04 NOTE — ED NOTES
Critical troponin of 139 received from lab. Resident notified.       Briseida Steven RN  01/04/21 4516

## 2021-01-04 NOTE — ED NOTES
PT RETURNED FROM CT with staff/on monitor - warmed blanket applied.  HOB elevated 30%     Nidia Montalvo RN  01/04/21 1749

## 2021-01-04 NOTE — ED NOTES
Neuro critical care aware  61 yo  Metastatic Ovarian CA  Status epilepticus  Intubated  On propofol  cultures and labs obtained  Got vancomycin and zosyn  Coming by mobile ICU       Damion Manuel RN  01/04/21 8762

## 2021-01-04 NOTE — ED PROVIDER NOTES
[address] [facility]     Emergency Department     Faculty Attestation    I performed a history and physical examination of the patient and discussed management with the resident. I have reviewed and agree with the residents findings including all diagnostic interpretations, and treatment plans as written. Any areas of disagreement are noted on the chart. I was personally present for the key portions of any procedures. I have documented in the chart those procedures where I was not present during the key portions. I have reviewed the emergency nurses triage note. I agree with the chief complaint, past medical history, past surgical history, allergies, medications, social and family history as documented unless otherwise noted below. Documentation of the HPI, Physical Exam and Medical Decision Making performed by sanju is based on my personal performance of the HPI, PE and MDM. For Physician Assistant/ Nurse Practitioner cases/documentation I have personally evaluated this patient and have completed at least one if not all key elements of the E/M (history, physical exam, and MDM). Additional findings are as noted. Patient brought in to [facility] emergency room nasal intubated due to status epilepticus. Patient has a history of seizures is on Keppra. Has a history of ovarian cancer with mets to her abdomen. She had an elevated white count, as well as an elevated lactic acid and she was given vancomycin and Zosyn at [facility] facility. Her head CT was done at [facility]. Nasal tube was exchanged for an ET tube and patient was transferred for further evaluation. On arrival patient is intubated, on sedation per neurology she does open eyes and track with her eyes but does not obey commands. She does withdraw to pain.   Plan for neuro critical care evaluation, load with Keppra, repeat lactate and basic labs and admission      EKG Interpretation Interpreted by me  EKG shows normal sinus rhythm, normal axis Q waves noted in the septal leads with T wave inversions noted, no depressions or ST elevations noted.   KS interval of 138 QRS of 64 and a QT corrected of 432      Thresea Osler, D.O, M.P.H  Attending Emergency Medicine Physician         Thresea Osler, DO  01/04/21 6117

## 2021-01-04 NOTE — ED NOTES
Pt arrives to ed via mobile life intubated from [facility] ER. Pt has propofol drip infusing at 50 mcg/kg/min. Vancomycin completed upon arrival to ed. Pt has LR #5 infusing at this time due to septic shock. Pt has NP airway in the right nare, NG to the left nare, wright catheter in place, NOEL drain to left chest. Fluids continued at 125 ml/hr. Pt is sedated, intubated with a 7.0 et 23@ the teeth. Pt sent for neurology to see to due new onset seizure per [place] [facility]. Pt has soft restraints in place upon arrival.       Pt originally presented to [facility] ED by EMS from home. Pt started having seizures at home, pt has history of metastatic ovarian cancer, currently receiving chemo, has a port to the right chest that is accessed. EMS attempted to nasally intubate pt and was unsuccessful. Pt had NP airway placed to right baird to control bleeding, per [place] [facility]. Pt was intubated at [facility] ER with ketamine, etomidate and succs. ET tube, wright, NG placed. Pt had ct scan started on IV antibiotics. Pt received zosyn, vancomycin infusion completed upon arrival to ED. Pt received 4 L LR pta with 5th L infusing upon arrival to ED, infusion slowed from WO. Pt being transferred for NICU, no bed available for direct admit.         José Luis Wolff RN  01/04/21 Emily 27 Malaika Boles  01/04/21 9627

## 2021-01-04 NOTE — H&P
Neuro ICU History & Physical    Patient Name: Carmen Garcia [facility]  Patient : 1965  Room/Bed:   Code Status: Full  Allergies: No Known Allergies    CHIEF COMPLAINT     Seizure    HPI    History Obtained From: EMR    The patient is a 60 y.o. female with history of DM, GERD, and metastatic ovarian cancer who presented as a transfer from [facility] ER for status epilepticus. Patient was found altered by family, upon EMS arrival, patient had witnessed seizure activity, was given Versed 4mg without cessation of seizure activity. Attempt at nasal intubation was unsuccessful, and patient was transported to [facility] ED. Per documentation, patient arrived to the outlying facility seizing. Intubation attempt with etomidate was unsuccessful, so succinylcholine was administered and patient was succuss fully intubated and started on Propofol. Once propofol started, clinical seizure activity resolved. Infectious workup sent for Marked leukocytosis and broad spectrum antibiotics, Vancomycin and zosyn started. She was also given 5L Lactated ringer for fluid resuscitation. Of note, patient presented in 2020 for new onset seizures. MRI concerning for PRES. LTME at that time showed bilateral parietal occipital sharps left more right, with 4 - 10 second seizures. CSF unremarkable. Discharged home on Keppra 1500 mg BID. Patient also had an admission at the beginning of 2020 at [facility] for severe GI bleeding requiring multiple blood transfusions with evidence of intra-abdominal mass at the splenic area with GI infiltration which was embolized with no recurrence. Has been following with oncology for treatment of ovarian cancer metastatic disease with extensive intraabdominal and lung mets. She has been treated with Doxil, although appears to have missed last two transfusion appts. On arrival to [facility], propofol at 50 mcg/kg/min, increased to 60 mcg/kg/min due to agitation and biting on tube. With sedation held, patient opens eyes and tracks. Does not follow commands, but moving bilateral upper extremities spontaneously. NG in left nares hooked to LIWS with large amount of dark brown fluid, looks like old blood, likely from epistaxis from traumatic intubation attempt. Right nares with nasopharyngeal airway in place which was removed without any bleeding. Several blood clots suctioned from ETT. Repeat CBC pending. No family at bedside. Admitted to ICU From: ER   Reason for ICU Admission: Status epilepticus       PATIENT HISTORY   Past Medical History:        Diagnosis Date    Anemia     Bleeding 10/2020    intra-abdominal bleeding -due to splenic mass with GI infiltration. Status post embolization    Cervical cancer ([address]     Depression     Diabetes mellitus ([address]     GERD (gastroesophageal reflux disease)     Metastatic cancer ([address] 10/2020    extensive intraabdominal and splenic involvement and lung mets.  Ovarian cancer ([address]     low grade serous ovarian carcinoma    Post chemo evaluation     2007: Chemo via med onc (Dr. Hetal Villatoro), 2008: Ronelle Violet due to rising CA-125, 2013: intraperitoneal chemo,12/2015: Ca125 - 25     Splenic lesion        Past Surgical History:        Procedure Laterality Date    ABSCESS DRAINAGE  2013    Franca rectal    ANUS SURGERY      ANAL FISSURECTOMY    COLECTOMY  03/2013    ex lap, tumor debulking, transverse colectomy w reanastamosis, subgastric omentectomy, intraperitoneal port placement    HYSTERECTOMY, TOTAL ABDOMINAL      IR EMBOLIZATION HEMORRHAGE  10/05/2020    intra-abdominal bleeding -due to splenic mass with GI infiltration. Status post embolization [employer] interlock coils x7. mri condtional 3t ok, safe immediately post implant.     IR PORT PLACEMENT EQUAL OR GREATER THAN 5 YEARS  8/24/2020 IR PORT PLACEMENT EQUAL OR GREATER THAN 5 YEARS 8/24/2020 Jossie Pressley MD [facility]    PORT SURGERY      IP Port    TONSILLECTOMY         Social History:   Social History     Socioeconomic History    Marital status: Single     Spouse name: Not on file    Number of children: Not on file    Years of education: Not on file    Highest education level: Not on file   Occupational History    Not on file   Social Needs    Financial resource strain: Not on file    Food insecurity     Worry: Not on file     Inability: Not on file    Transportation needs     Medical: Not on file     Non-medical: Not on file   Tobacco Use    Smoking status: Current Every Day Smoker     Packs/day: 1.00    Smokeless tobacco: Current User   Substance and Sexual Activity    Alcohol use: Not Currently    Drug use: Never    Sexual activity: Not on file   Lifestyle    Physical activity     Days per week: Not on file     Minutes per session: Not on file    Stress: Not on file   Relationships    Social connections     Talks on phone: Not on file     Gets together: Not on file     Attends Evangelical service: Not on file     Active member of club or organization: Not on file     Attends meetings of clubs or organizations: Not on file     Relationship status: Not on file    Intimate partner violence     Fear of current or ex partner: Not on file     Emotionally abused: Not on file     Physically abused: Not on file     Forced sexual activity: Not on file   Other Topics Concern    Not on file   Social History Narrative    Not on file       Family History:       Problem Relation Age of Onset    Alcohol Abuse Mother     Cirrhosis Mother        Allergies:    Patient has no known allergies. Medications Prior to Admission:    Not in a hospital admission.     Current Medications:  Current Facility-Administered Medications: levETIRAcetam (KEPPRA) 2,250 mg in sodium chloride 0.9 % 100 mL IVPB, 2,250 mg, Intravenous, Once chlorhexidine (PERIDEX) 0.12 % solution 15 mL, 15 mL, Mouth/Throat, BID  famotidine (PEPCID) injection 20 mg, 20 mg, Intravenous, BID  propofol injection, 10 mcg/kg/min, Intravenous, Titrated    REVIEW OF SYSTEMS     Unable to assess due to current condition    PHYSICAL EXAM:     BP (!) 162/102   Pulse 116   Temp 98.8 °F (37.1 °C) (Oral)   Resp 26   Ht 5' 6\" (1.676 m)   Wt 165 lb 5.5 oz (75 kg)   SpO2 100%   BMI 26.69 kg/m²     PHYSICAL EXAM:  CONSTITUTIONAL:  Intubated, sedation held. Opens eyes spontaneously, tracks appropriately. Does not follow commands. HEAD:  normocephalic   EYES:  PERRLA, EOMI.   ENT:  Dried blood noted around bilateral nares   LUNGS:  Equal air entry bilaterally; Left sided pig tail pleural drain in place   CARDIOVASCULAR:  normal s1 / s2   ABDOMEN:  Soft, no rigidity   NECK supple, symmetric    NEUROLOGIC:  Mental Status: Intubated, does not nod appropriately or follow commands             Cranial Nerves:    III: Pupils:  equal, round, reactive to light  III,IV,VI: Extra Ocular Movements: intact    Motor Exam:    Moves bilateral upper extremities purposefully and spontaneously. Withdraws bilateral lower extremities.       SKIN No obvious ecchymosis, rashes, or lesions        LABS AND IMAGING:     RECENT LABS:  CBC with Differential:    Lab Results   Component Value Date    WBC 32.2 01/04/2021    RBC 4.65 01/04/2021    HGB 10.5 01/04/2021    HCT 35.3 01/04/2021    PLT 1,142 01/04/2021    MCV 76.0 01/04/2021    MCH 22.5 01/04/2021    MCHC 29.6 01/04/2021    RDW 25.3 01/04/2021    LYMPHOPCT 6 01/04/2021    MONOPCT 1 01/04/2021    BASOPCT 0 01/04/2021    MONOSABS 0.32 01/04/2021    LYMPHSABS 1.93 01/04/2021    EOSABS 0.32 01/04/2021    BASOSABS 0.00 01/04/2021    DIFFTYPE NOT REPORTED 01/04/2021     BMP:    Lab Results   Component Value Date     01/04/2021    K 3.5 01/04/2021     01/04/2021    CO2 20 01/04/2021    BUN 5 01/04/2021    LABALBU 3.1 01/04/2021 - IVF: Normal saline @ 75 mL/hr  - Replace electrolytes PRN  - Daily BMP    GI/NUTRITION:  NUTRITION:  NPO  - Bowel regimen: Miralax prn  - GI prophylaxis: Pepcid 20 mg BID  - History of recent GI bleeding 10/2020 evidence of intra-abdominal mass at the splenic area with GI infiltration which was embolized with no recurrence.    - Monitor for concern of GI bleeding    ID:  - Afebrile  - Marked leukocytosis, WBC 31.4  - Blood cultures sent   - UA unremarkable  - Lactic acidosis, 8.8, continue to trend  - Follow up procalcitonin  - Continue broad spectrum antibiotics until cultures result  - Consider sending pleural fluid  - Continue to monitor for fevers  - Daily CBC    HEME:   - H&H 10.7/37.1; stable  - Chronic thrombocytosis, Platelets 807  - Hem/onc consult, appreciate recommendations  - Daily CBC    ENDOCRINE:  - Continue to monitor blood glucose, goal <180    PROPHYLAXIS:  Stress ulcer: H2 blocker    DVT PROPHYLAXIS:  - SCD sleeves - Thigh High   - No chemoprophylaxis anticoagulation at this time- held tonight due to epistaxis, once stable start Lovenox      DISPOSITION: Admit to ICU      ESTRELLITA Vega - [facility]  Neuro Critical Care Service   Pager [phone]  1/4/2021     5:04 PM

## 2021-01-04 NOTE — PROGRESS NOTES
I did not evaluate this patient or participate in their care. I erroneously signed-up for this patient and clicked on their chart.     Janine Nicholson, DO  Emergency Medicine Resident

## 2021-01-04 NOTE — ED NOTES
Dr Nicole Bring speaking on the phone with Dr. Maximiliano Leiva with critical care.       Eddie Hanna RN  01/04/21 [id number]

## 2021-01-04 NOTE — ED PROVIDER NOTES
[facility]  ED  Emergency Department Encounter  Emergency Medicine Resident     Pt Name: Santana Olson  MRN: [id number]  [place] 1965  Date of evaluation: 1/4/21  PCP:  No primary care provider on file. CHIEF COMPLAINT       Chief Complaint   Patient presents with    Seizures     [facility] [facility]       HISTORY OFPRESENT ILLNESS  (Location/Symptom, Timing/Onset, Context/Setting, Quality, Duration, Modifying Factors,Severity.)      Santana Olson is a 60 y. o.yo female history of ovarian cancer status post mets to brain and liver, was recently diagnosed with seizure on October 2020 who presents with seizure at home. When [facility] had decrease in mentation requiring intubation. CT head done was negative for bleed. Patient presented intubated sedated on propofol. Empirically started her on broad-spectrum antibiotics due to elevated white count of 35,000 and lactic acid 8.8, no obvious source of infection found. Chest x-ray shows mild to moderate edema and small left effusion. PAST MEDICAL / SURGICAL / SOCIAL / FAMILY HISTORY      has a past medical history of Anemia, Bleeding, Cervical cancer ([address] Depression, Diabetes mellitus ([address] GERD (gastroesophageal reflux disease), Metastatic cancer ([address] Ovarian cancer ([address] Post chemo evaluation, and Splenic lesion. has a past surgical history that includes Hysterectomy, total abdominal; Port Surgery; Tonsillectomy; IR PORT PLACEMENT > 5 YEARS (8/24/2020); Anus surgery; Abscess Drainage (2013); colectomy (03/2013); and IR EMBOLIZATION HEMORRHAGE (10/05/2020).      Social History     Socioeconomic History    Marital status: Single     Spouse name: Not on file    Number of children: Not on file    Years of education: Not on file    Highest education level: Not on file   Occupational History    Not on file   Social Needs    Financial resource strain: Not on file    Food insecurity Worry: Not on file     Inability: Not on file    Transportation needs     Medical: Not on file     Non-medical: Not on file   Tobacco Use    Smoking status: Current Every Day Smoker     Packs/day: 1.00    Smokeless tobacco: Current User   Substance and Sexual Activity    Alcohol use: Not Currently    Drug use: Never    Sexual activity: Not on file   Lifestyle    Physical activity     Days per week: Not on file     Minutes per session: Not on file    Stress: Not on file   Relationships    Social connections     Talks on phone: Not on file     Gets together: Not on file     Attends Gnosticist service: Not on file     Active member of club or organization: Not on file     Attends meetings of clubs or organizations: Not on file     Relationship status: Not on file    Intimate partner violence     Fear of current or ex partner: Not on file     Emotionally abused: Not on file     Physically abused: Not on file     Forced sexual activity: Not on file   Other Topics Concern    Not on file   Social History Narrative    Not on file       Family History   Problem Relation Age of Onset    Alcohol Abuse Mother     Cirrhosis Mother         Allergies:  Patient has no known allergies. Home Medications:  Prior to Admission medications    Medication Sig Start Date End Date Taking? Authorizing Provider   metFORMIN (GLUCOPHAGE-XR) 500 MG extended release tablet Take 2 tablets by mouth twice daily 1/4/21   Meera Calvert MD   oxyCODONE (OXYCONTIN) 10 MG extended release tablet Take 1 tablet by mouth every 12 hours for 7 days. 12/30/20 1/6/21  Himanshu Ozuna MD   oxyCODONE-acetaminophen (PERCOCET)  MG per tablet Take 1 tablet by mouth every 4 hours for 7 days.  12/30/20 1/6/21  Janak Newberry MD   amLODIPine (NORVASC) 10 MG tablet Take 1 tablet by mouth daily 10/29/20   Vasquez Grace MD   levETIRAcetam (KEPPRA) 750 MG tablet Take 2 tablets by mouth 2 times daily 10/28/20   Eden Black MD omeprazole (PRILOSEC) 20 MG delayed release capsule Take 20 mg by mouth daily    Historical Provider, MD   HYDROcodone-acetaminophen (NORCO) 5-325 MG per tablet Take 1 tablet by mouth every 6 hours as needed for Pain. Historical Provider, MD   prochlorperazine (COMPAZINE) 10 MG tablet Take 10 mg by mouth every 6 hours as needed    Historical Provider, MD   tiZANidine (ZANAFLEX) 4 MG tablet Take 4 mg by mouth every 6 hours as needed    Historical Provider, MD   ibuprofen (ADVIL;MOTRIN) 200 MG tablet Take 200 mg by mouth every 6 hours as needed for Pain    Historical Provider, MD   pantoprazole (PROTONIX) 40 MG tablet Take 40 mg by mouth 2 times daily    Historical Provider, MD   ferrous sulfate (IRON 325) 325 (65 Fe) MG tablet Take 1 tablet by mouth daily (with breakfast)  Patient not taking: Reported on 10/16/2020 9/18/20   Iftikhar Suarez MD   aspirin 81 MG chewable tablet Take 81 mg by mouth nightly    Historical Provider, MD   simvastatin (ZOCOR) 40 MG tablet Take 40 mg by mouth nightly    Historical Provider, MD   sertraline (ZOLOFT) 100 MG tablet Take 50 mg by mouth daily  8/8/20   Historical Provider, MD       REVIEW OFSYSTEMS    (2-9 systems for level 4, 10 or more for level 5)      Review of Systems   Unable to perform ROS: Intubated       PHYSICAL EXAM   (up to 7 for level 4, 8 or more forlevel 5)      INITIAL VITALS:   ED Triage Vitals   BP Temp Temp Source Pulse Resp SpO2 Height Weight   01/04/21 1624 01/04/21 1635 01/04/21 1635 01/04/21 1624 01/04/21 1624 01/04/21 1624 01/04/21 1635 01/04/21 1624   (!) 148/80 98.8 °F (37.1 °C) Oral 99 19 100 % 5' 6\" (1.676 m) 165 lb 5.5 oz (75 kg)       Physical Exam  Constitutional:       Appearance: She is normal weight. HENT:      Head: Normocephalic and atraumatic. No raccoon eyes or Ortega's sign.         Mouth/Throat:      Mouth: Mucous membranes are moist.   Eyes:      Conjunctiva/sclera: Conjunctivae normal. Pupils: Pupils are equal, round, and reactive to light. Neck:      Musculoskeletal: No neck rigidity. Cardiovascular:      Rate and Rhythm: Tachycardia present. Pulses:           Dorsalis pedis pulses are 2+ on the right side and 2+ on the left side. Heart sounds: No murmur. No friction rub. Abdominal:      General: There is no distension. Palpations: Abdomen is soft. There is no mass. Musculoskeletal:         General: No deformity. Right lower leg: No edema. Left lower leg: No edema. Skin:     General: Skin is warm. Capillary Refill: Capillary refill takes less than 2 seconds. Coloration: Skin is not jaundiced.          DIFFERENTIAL  DIAGNOSIS     PLAN (LABS / IMAGING / EKG):  Orders Placed This Encounter   Procedures    MRSA DNA Probe, Nasal    Culture, Body Fluid    Culture, Respiratory    Culture, Blood 1    Culture, Blood 1    Culture, Urine    XR CHEST PORTABLE    XR CHEST PORTABLE    MRI BRAIN W WO CONTRAST    XR ABDOMEN FOR NG/OG/NE TUBE PLACEMENT    Troponin    Lactic acid, plasma    CBC Auto Differential    Urinalysis Reflex to Culture    Troponin    Procalcitonin    Lactic Acid, Plasma    Lactate, Sepsis    Troponin    Body Fluid Cell Count with Differential    Cytology, Non-Gyn    Basic Metabolic Panel w/ Reflex to MG    Blood gas, arterial    Calcium, Ionized    CBC auto differential    Hepatic function panel    LACTATE DEHYDROGENASE, BODY FLUID    LACTATE DEHYDROGENASE    Magnesium    Troponin    Troponin    SPECIMEN REJECTION    CBC Auto Differential    PREVIOUS SPECIMEN    APTT    Urinalysis with Microscopic    Diet NPO Effective Now    Elevate Head of Bed    Spontaneous Awakening Trial (SAT)    Vital signs per unit routine    Daily weights    Intake and output    Place intermittent pneumatic compression device    Telemetry monitoring - continuous duration    Up with assistance  Misc nursing order (specify)    Insert indwelling urinary catheter    Discontinue indwelling urinary catheter when documented indications no longer apply per hospital policy    Full Code    Inpatient consult to Neurocritical care    Inpatient consult to Dietitian    Inpatient consult to Hem/Onc    Inpatient consult to Cardiology    Pharmacy to Dose: Vancomycin    Capnography    Pulse oximetry, continuous    Initiate Oxygen Therapy Protocol    Respiratory care evaluation only    Initiate RT Adult Mechanical Ventilation Protocol    Mechanical Ventilation with default initial settings    ABG draw    Pulse oximetry, continuous    Arterial Blood Gas, POC    Arterial Blood Gas, POC    POCT Glucose    EKG 12 Lead    EKG 12 Lead    EKG 12 Lead    EEG video monitoring    PATIENT STATUS (FROM ED OR OR/PROCEDURAL) Inpatient    Restraints non-violent or non-self-destructive    Seizure precautions       MEDICATIONS ORDERED:  Orders Placed This Encounter   Medications    levETIRAcetam (KEPPRA) 2,250 mg in sodium chloride 0.9 % 100 mL IVPB    levETIRAcetam in NaCl (KEPPRA) 1000 MG/100ML solution     TORY GONZALEZ: cabinet override    DISCONTD: chlorhexidine (PERIDEX) 0.12 % solution 15 mL    DISCONTD: famotidine (PEPCID) injection 20 mg    propofol injection    sodium chloride flush 0.9 % injection 10 mL    sodium chloride flush 0.9 % injection 10 mL    OR Linked Order Group     promethazine (PHENERGAN) tablet 12.5 mg     ondansetron (ZOFRAN) injection 4 mg    polyethylene glycol (GLYCOLAX) packet 17 g    OR Linked Order Group     acetaminophen (TYLENOL) tablet 650 mg     acetaminophen (TYLENOL) suppository 650 mg    0.9 % sodium chloride infusion    ferrous sulfate (FE TABS 325) EC tablet 325 mg    HYDROcodone-acetaminophen (NORCO) 5-325 MG per tablet 1 tablet    sertraline (ZOLOFT) tablet 50 mg    atorvastatin (LIPITOR) tablet 20 mg    levetiracetam (KEPPRA) 1500 mg/100 mL IVPB  vancomycin (VANCOCIN) 1250 mg in dextrose 5 % 250 mL IVPB     Order Specific Question:   Antimicrobial Indications     Answer:   Sepsis of Unknown Etiology    piperacillin-tazobactam (ZOSYN) 3.375 g in dextrose 5 % 50 mL IVPB extended infusion (mini-bag)     Order Specific Question:   Antimicrobial Indications     Answer:   Sepsis of Unknown Etiology    propofol 1000 MG/100ML injection     Tiki Pilar: cabinet override    AND Linked Order Group     pantoprazole (PROTONIX) injection 40 mg     sodium chloride (PF) 0.9 % injection 10 mL    vancomycin (VANCOCIN) intermittent dosing (placeholder)     Order Specific Question:   Antimicrobial Indications     Answer:   Sepsis of Unknown Etiology    heparin 25,000 units in dextrose 5% 250 mL infusion       DDX: seizures, intracranial metastasis, intracranial bleed     Initial MDM/Plan: 60 y.o. female who presents with seizure status post intubation. She is a direct admit to neuro critical care. Will repeat lactate, troponin, CBC.     DIAGNOSTIC RESULTS / EMERGENCYDEPARTMENT COURSE / MDM     LABS:  Labs Reviewed   TROPONIN - Abnormal; Notable for the following components:       Result Value    Troponin, High Sensitivity 139 (*)     All other components within normal limits   CBC WITH AUTO DIFFERENTIAL - Abnormal; Notable for the following components:    WBC 31.4 (*)     Hemoglobin 10.7 (*)     MCV 79.3 (*)     MCH 22.9 (*)     RDW 24.9 (*)     Platelets 903 (*)     Seg Neutrophils 96 (*)     Lymphocytes 3 (*)     Monocytes 0 (*)     Eosinophils % 0 (*)     Segs Absolute 30.15 (*)     Absolute Lymph # 0.94 (*)     Absolute Mono # 0.00 (*)     Basophils Absolute 0.31 (*)     All other components within normal limits   TROPONIN - Abnormal; Notable for the following components:    Troponin, High Sensitivity 159 (*)     All other components within normal limits   PROCALCITONIN - Abnormal; Notable for the following components:    Procalcitonin 0.85 (*) All other components within normal limits   TROPONIN - Abnormal; Notable for the following components:    Troponin, High Sensitivity 161 (*)     All other components within normal limits   BASIC METABOLIC PANEL W/ REFLEX TO MG FOR LOW K - Abnormal; Notable for the following components:    Glucose 118 (*)     BUN 2 (*)     CREATININE 0.21 (*)     Calcium 8.2 (*)     Sodium 132 (*)     Potassium 3.1 (*)     Chloride 96 (*)     All other components within normal limits   CALCIUM, IONIZED - Abnormal; Notable for the following components:    Calcium, Ion 0.96 (*)     All other components within normal limits   HEPATIC FUNCTION PANEL - Abnormal; Notable for the following components:    Alb 3.0 (*)     Total Bilirubin 0.18 (*)     Albumin/Globulin Ratio 0.9 (*)     All other components within normal limits   TROPONIN - Abnormal; Notable for the following components:    Troponin, High Sensitivity 159 (*)     All other components within normal limits   TROPONIN - Abnormal; Notable for the following components:    Troponin, High Sensitivity 169 (*)     All other components within normal limits   CBC WITH AUTO DIFFERENTIAL - Abnormal; Notable for the following components:    WBC 19.9 (*)     Hemoglobin 10.7 (*)     MCV 77.3 (*)     MCH 22.7 (*)     RDW 24.8 (*)     Platelets 922 (*)     Seg Neutrophils 77 (*)     Lymphocytes 15 (*)     Eosinophils % 0 (*)     Segs Absolute 15.32 (*)     Absolute Mono # 1.39 (*)     All other components within normal limits   ARTERIAL BLOOD GAS, POC - Abnormal; Notable for the following components:    POC PO2 224.6 (*)     POC O2  (*)     All other components within normal limits   ARTERIAL BLOOD GAS, POC - Abnormal; Notable for the following components:    POC HCO3 29.1 (*)     TCO2 (calc), Art 30 (*)     Positive Base Excess, Art 5 (*)     All other components within normal limits   POCT GLUCOSE - Abnormal; Notable for the following components:    POC Glucose 129 (*) All other components within normal limits   CULTURE, BODY FLUID   MRSA DNA PROBE, NASAL   CULTURE, RESPIRATORY   CULTURE, BLOOD 1   CULTURE, BLOOD 1   CULTURE, URINE   URINE RT REFLEX TO CULTURE   LACTIC ACID, PLASMA   LACTIC ACID, PLASMA   LACTATE, SEPSIS   BODY FLUID CELL COUNT WITH DIFFERENTIAL   LACTATE DEHYDROGENASE, BODY FLUID   LACTATE DEHYDROGENASE   MAGNESIUM   SPECIMEN REJECTION   CYTOLOGY, NON-GYN   LACTIC ACID, PLASMA   PREVIOUS SPECIMEN   LACTIC ACID, PLASMA   APTT   APTT   APTT   URINALYSIS WITH MICROSCOPIC         RADIOLOGY:  Ct Head Wo Contrast    Result Date: 1/4/2021 EXAMINATION: CT OF THE HEAD WITHOUT CONTRAST  1/4/2021 9:59 am TECHNIQUE: CT of the head was performed without the administration of intravenous contrast. Dose modulation, iterative reconstruction, and/or weight based adjustment of the mA/kV was utilized to reduce the radiation dose to as low as reasonably achievable. COMPARISON: 08/20/2020, 08/04/2020 HISTORY: ORDERING SYSTEM PROVIDED HISTORY: seizure TECHNOLOGIST PROVIDED HISTORY: seizure Reason for Exam: Seizure, LOC. Pt is intubated. Hx of ovarian CA. Acuity: Acute Type of Exam: Initial Relevant Medical/Surgical History: ovarian CA, DM FINDINGS: BRAIN/VENTRICLES: There is no acute intracranial hemorrhage, mass effect or midline shift. No abnormal extra-axial fluid collection. The gray-white differentiation is maintained without evidence of an acute infarct. Mild scattered regions of low attenuation within the periventricular, subcortical, and deep white matter, presumably sequelae of chronic microangiopathic ischemic white matter change, though ultimately age indeterminate in the absence of prior comparison. Mild volume loss with commensurate ventricular and sulcal prominence. There is no evidence of hydrocephalus. ORBITS: The visualized portion of the orbits demonstrate no acute abnormality. SINUSES: There are air-fluid levels with aerated secretions within the sphenoid sinuses and ethmoid air cells on a background of mild mucosal thickening. Small retention cysts are present within the partially imaged maxillary sinuses. Mastoid air cells are clear. SOFT TISSUES/SKULL:  Heterogeneous mineralization of the calvarium, clivus, and partially imaged upper cervical spine. No fracture. Superficial scalp soft tissues are without focal abnormality. Endotracheal and enteric tubes traverse the nasopharynx and oropharynx, only partially imaged. No acute intracranial hemorrhage, mass-effect, midline shift, or abnormal extra-axial collection. In the setting of known malignant neoplasm with seizure, contrast-enhanced brain MRI is recommended to further evaluate for intracranial metastatic disease. Minimal regions of low attenuation within the periventricular, subcortical, and deep white matter, presumably sequelae of chronic microangiopathic ischemic white matter change, though ultimately age indeterminate without prior comparison imaging. Recommend attention on follow-up. Heterogeneous mineralization is suggested within the calvarium, clivus, and partially imaged upper cervical spine, suspicious for osseous metastatic disease in the setting of a known primary neoplasm. Attention on follow-up MRI. Air-fluid levels with aerated secretions in the sphenoid sinuses and ethmoid air cells, which may relate to intubated status versus underlying pre-existing acute sinusitis.      Xr Chest Portable    Result Date: 1/5/2021  EXAMINATION: ONE XRAY VIEW OF THE CHEST 1/5/2021 6:11 am COMPARISON: January 4, 2021, chest exam HISTORY: ORDERING SYSTEM PROVIDED HISTORY: Intubated, eval for acute findings TECHNOLOGIST PROVIDED HISTORY: Intubated, eval for acute findings Reason for Exam: supine port Acuity: Unknown Type of Exam: Ongoing FINDINGS: Stable right IJ port catheter, endotracheal, nasogastric tubes and left basilar pigtail catheter without definite pneumothorax Stable cardiac silhouette Persistent mild streaky right basilar atelectasis and moderate left perihilar/left basilar opacity with possible mild vascular congestion     Stable chest Stable support lines Possible mild edema with moderate left perihilar, left basilar opacity related to combined pleuroparenchymal process     Xr Chest Portable    Result Date: 1/4/2021 EXAMINATION: ONE XRAY VIEW OF THE CHEST 1/4/2021 5:02 pm COMPARISON: January 4, 2021 HISTORY: ORDERING SYSTEM PROVIDED HISTORY: evaluate ETT position TECHNOLOGIST PROVIDED HISTORY: evaluate ETT position Reason for Exam: ett position FINDINGS: ET and enteric tubes unchanged. ET tube appears to terminate 42 mm above the alejandro. Right IJ MediPort unchanged. Small left effusion. Mild edema. Pigtail catheter left lung base. Metallic coils left upper quadrant of the abdomen. Heart and mediastinum normal.  Bony thorax intact. Subsegmental atelectasis right base. ET tube as above. Life support apparatus otherwise stable. Mild-to-moderate edema and small left effusion unchanged. Xr Chest Portable    Result Date: 1/4/2021  EXAMINATION: ONE XRAY VIEW OF THE CHEST 1/4/2021 11:38 am COMPARISON: 10/24/2020 HISTORY: ORDERING SYSTEM PROVIDED HISTORY: post intubation TECHNOLOGIST PROVIDED HISTORY: post intubation Reason for Exam: LOC. NG tube & ET tube placed Acuity: Acute Type of Exam: Initial FINDINGS: Endotracheal tube is in place with its tip terminating approximately 4.4 cm above the alejandro. Enteric tube courses below the left hemidiaphragm. A left basilar pigtail chest tube catheter noted in place. There is a residual small to moderate-sized left-sided effusion with adjacent airspace disease. Right hemithorax is clear. Vascular port terminates overlying the expected location of the SVC. No convincing evidence for pneumothorax. Vascular coiling identified within the upper abdomen. Small to moderate left pleural effusion with adjacent airspace disease. Left-sided chest tube terminates overlying the left lung base. Endotracheal and enteric tubes as above.      Xr Abdomen For Ng/og/ne Tube Placement    Result Date: 1/4/2021 EXAMINATION: ONE SUPINE XRAY VIEW(S) OF THE ABDOMEN 1/4/2021 10:28 pm COMPARISON: None. HISTORY: ORDERING SYSTEM PROVIDED HISTORY: Confirmation of course of NG/OG/NE tube and location of tip of tube TECHNOLOGIST PROVIDED HISTORY: Confirmation of course of NG/OG/NE tube and location of tip of tube Portable? ->Yes Reason for Exam: supine FINDINGS: Enteric tube in the stomach. Tubing left lower quadrant of the abdomen requires clinical correlation. 38 x 44 mm a radiodensity right lower quadrant. Nonspecific bowel gas pattern. Osseous structures normal.     Enteric tube in the stomach. Other incidental findings as above. EKG  EKG Interpretation    Interpreted by me    Rhythm: normal sinus   Rate: normal  Axis: normal  Ectopy: none  Conduction: normal  ST Segments: no acute change  T Waves: no acute change  Q Waves: none    Clinical Impression: Low voltage. No acute changes and normal EKG    All EKG's are interpreted by the Emergency Department Physicianwho either signs or Co-signs this chart in the absence of a cardiologist.    EMERGENCY DEPARTMENT COURSE:  ED Course as of Jan 05 0907   Mon Jan 04, 2021   1730 Elevated troponin at 139, from 44 at outlying facility, cardiology consulted, repeat EKG    [AN]   1744 Bedside echo demonstrates fluid around the heart. Diminish cardiac motion, but fluid around heart is not causing cardiac tamponade.     [AN]   8611 Spoke to cardiologist, would want to trend another troponin. If it is increasing, will then start heparin drip if no contraindication    [AN]   7790 Spoke with Pharmacy, okay to heparinize pt if trop is still uptrending    [AN]      ED Course User Index  [AN] Jennifer Vivas MD          PROCEDURES:  None    CONSULTS:  IP CONSULT TO NEUROCRITICAL CARE  IP CONSULT TO DIETITIAN  IP CONSULT TO HEM/ONC  IP CONSULT TO CARDIOLOGY  PHARMACY TO DOSE VANCOMYCIN    CRITICAL CARE:  Please see attending note    FINAL IMPRESSION      1.  Seizure ([address] DISPOSITION / PLAN     DISPOSITION Admitted 01/04/2021 05:03:30 PM      PATIENT REFERRED TO:  No follow-up provider specified.     DISCHARGE MEDICATIONS:  Current Discharge Medication List          Shannon Saul MD  Emergency Medicine Resident    (Please note that portions of this note were completed with a voice recognition program.Efforts were made to edit the dictations but occasionally words are mis-transcribed.)      Shannon Saul MD  Resident  01/05/21 [id number]

## 2021-01-04 NOTE — ED NOTES
[facility] FOR TRANSPORT REPORT TO [address] [place] [address] Dr Gregory Waterman, RN  01/04/21 6341

## 2021-01-04 NOTE — ED PROVIDER NOTES
[facility]  [facility]  Phone: [phone]        Pt Name: Macy Hall  MRN: [id number]  [place] 1965  Date of evaluation: 1/4/21      CHIEF COMPLAINT     Chief Complaint   Patient presents with    Loss of Consciousness         HISTORY OF PRESENT ILLNESS  (Location/Symptom, Timing/Onset, Context/Setting, Quality, Duration, Modifying Factors, Severity.)    Macy Hall is a 60 y.o. female who presents with altered mental status. This 59-year-old female is reported by EMS that she has cancer comes in with seizure she was given a total of 4 mg of Versed without relief of her seizures EMS attempted a nasal elevation but without success and brings her to our facility for evaluation no other information is available at this time      REVIEW OF SYSTEMS    (2-9 systems for level 4, 10 or more for level 5)     Review of Systems   Unable to perform ROS: Acuity of condition       PAST MEDICAL HISTORY    has a past medical history of Anemia, Bleeding, Cervical cancer ([address] Depression, Diabetes mellitus ([address] GERD (gastroesophageal reflux disease), Metastatic cancer ([address] Ovarian cancer ([address] Post chemo evaluation, and Splenic lesion. SURGICAL HISTORY      has a past surgical history that includes Hysterectomy, total abdominal; Port Surgery; Tonsillectomy; IR PORT PLACEMENT > 5 YEARS (8/24/2020); Anus surgery; Abscess Drainage (2013); colectomy (03/2013); and IR EMBOLIZATION HEMORRHAGE (10/05/2020). [address]       Discharge Medication List as of 1/4/2021  3:52 PM      CONTINUE these medications which have NOT CHANGED    Details   oxyCODONE (OXYCONTIN) 10 MG extended release tablet Take 1 tablet by mouth every 12 hours for 7 days. , Disp-14 tablet, R-0Normal      oxyCODONE-acetaminophen (PERCOCET)  MG per tablet Take 1 tablet by mouth every 4 hours for 7 days. , Disp-42 tablet, R-0Normal      amLODIPine (NORVASC) 10 MG tablet Take 1 tablet by mouth daily, Disp-30 tablet,R-3Normal      levETIRAcetam (KEPPRA) 750 MG tablet Take 2 tablets by mouth 2 times daily, Disp-120 tablet,R-2Print      omeprazole (PRILOSEC) 20 MG delayed release capsule Take 20 mg by mouth dailyHistorical Med      HYDROcodone-acetaminophen (NORCO) 5-325 MG per tablet Take 1 tablet by mouth every 6 hours as needed for Pain. Historical Med      prochlorperazine (COMPAZINE) 10 MG tablet Take 10 mg by mouth every 6 hours as neededHistorical Med      tiZANidine (ZANAFLEX) 4 MG tablet Take 4 mg by mouth every 6 hours as neededHistorical Med      ibuprofen (ADVIL;MOTRIN) 200 MG tablet Take 200 mg by mouth every 6 hours as needed for PainHistorical Med      pantoprazole (PROTONIX) 40 MG tablet Take 40 mg by mouth 2 times dailyHistorical Med      metFORMIN (GLUCOPHAGE-XR) 500 MG extended release tablet Take 2 tablets by mouth 2 times daily, Disp-30 tablet,R-2Normal      ferrous sulfate (IRON 325) 325 (65 Fe) MG tablet Take 1 tablet by mouth daily (with breakfast), Disp-60 tablet,R-5Normal      aspirin 81 MG chewable tablet Take 81 mg by mouth nightlyHistorical Med      simvastatin (ZOCOR) 40 MG tablet Take 40 mg by mouth nightlyHistorical Med      sertraline (ZOLOFT) 100 MG tablet Take 50 mg by mouth daily Historical Med             ALLERGIES     has No Known Allergies. FAMILY HISTORY     She indicated that her mother is . She indicated that her father is . family history includes Alcohol Abuse in her mother; Cirrhosis in her mother. SOCIAL HISTORY      reports that she has been smoking. She has been smoking about 1.00 pack per day. She uses smokeless tobacco. She reports previous alcohol use. She reports that she does not use drugs. PHYSICAL EXAM    (up to 7 for level 4, 8 or more for level 5)   INITIAL VITALS:  height is 5' 3\" (1.6 m) and weight is 74.8 kg (165 lb). Her axillary temperature is 97.5 °F (36.4 °C).  Her blood pressure is 154/81 (abnormal) and her pulse is 87. Her respiration is 12 and oxygen saturation is 100%. Physical Exam  Vitals signs and nursing note reviewed. Constitutional:       Comments: Patient is unresponsive to verbal and painful stimuli   HENT:      Head:      Comments: Patient is noted to have a nasopharyngeal airway in the right naris with blood surrounding this for EMS had attempted an intubation  Eyes:      Comments: Pupils are pinpoint bilateral   Neck:      Comments: Trachea is midline  Cardiovascular:      Rate and Rhythm: Regular rhythm. Tachycardia present. Pulmonary:      Comments: Patient has breath sounds bilateral with bag-valve-mask  Abdominal:      General: There is no distension. Musculoskeletal:      Comments: Patient does not respond to verbal or painful stimuli   Skin:     General: Skin is warm and dry. Neurological:      Comments: GCS of 3 with no response to verbal or painful stimuli eyes are closed the patient does not attempt to communicate patient appears to have seizure type activity         DIFFERENTIAL DIAGNOSIS/ MDM:     We will intubate the patient draw labs get a CT scan of the head chest x-ray    DIAGNOSTIC RESULTS     EKG: All EKG's are interpreted by the Emergency Department Physician who either signs or Co-signs this chart in the [address] a cardiologist.      Interpreted by No att. providers found     Rhythm: normal sinus   Rate: 95  Axis: 90  Ectopy: none  Conduction: normal  ST Segments: no acute change  T Waves: no acute change  Q Waves: none    Clinical Impression: normal sinus rhythm with no acute changes/normal EKG. No acute infarction/ischemia noted.       RADIOLOGY:  Non-plain film images such as CT, Ultrasound and MRI are read by the radiologist. Plain radiographic images are visualized and the radiologist interpretations are reviewed as follows:         Interpretation per the Radiologist below, if available at the time of this note:      Ct Head Wo Contrast    Result Date: 1/4/2021  EXAMINATION: CT OF THE HEAD WITHOUT CONTRAST  1/4/2021 9:59 am TECHNIQUE: CT of the head was performed without the administration of intravenous contrast. Dose modulation, iterative reconstruction, and/or weight based adjustment of the mA/kV was utilized to reduce the radiation dose to as low as reasonably achievable. COMPARISON: 08/20/2020, 08/04/2020 HISTORY: ORDERING SYSTEM PROVIDED HISTORY: seizure TECHNOLOGIST PROVIDED HISTORY: seizure Reason for Exam: Seizure, LOC. Pt is intubated. Hx of ovarian CA. Acuity: Acute Type of Exam: Initial Relevant Medical/Surgical History: ovarian CA, DM FINDINGS: BRAIN/VENTRICLES: There is no acute intracranial hemorrhage, mass effect or midline shift. No abnormal extra-axial fluid collection. The gray-white differentiation is maintained without evidence of an acute infarct. Mild scattered regions of low attenuation within the periventricular, subcortical, and deep white matter, presumably sequelae of chronic microangiopathic ischemic white matter change, though ultimately age indeterminate in the absence of prior comparison. Mild volume loss with commensurate ventricular and sulcal prominence. There is no evidence of hydrocephalus. ORBITS: The visualized portion of the orbits demonstrate no acute abnormality. SINUSES: There are air-fluid levels with aerated secretions within the sphenoid sinuses and ethmoid air cells on a background of mild mucosal thickening. Small retention cysts are present within the partially imaged maxillary sinuses. Mastoid air cells are clear. SOFT TISSUES/SKULL:  Heterogeneous mineralization of the calvarium, clivus, and partially imaged upper cervical spine. No fracture. Superficial scalp soft tissues are without focal abnormality. Endotracheal and enteric tubes traverse the nasopharynx and oropharynx, only partially imaged. No acute intracranial hemorrhage, mass-effect, midline shift, or abnormal extra-axial collection.   In the setting of known malignant neoplasm with seizure, contrast-enhanced brain MRI is recommended to further evaluate for intracranial metastatic disease. Minimal regions of low attenuation within the periventricular, subcortical, and deep white matter, presumably sequelae of chronic microangiopathic ischemic white matter change, though ultimately age indeterminate without prior comparison imaging. Recommend attention on follow-up. Heterogeneous mineralization is suggested within the calvarium, clivus, and partially imaged upper cervical spine, suspicious for osseous metastatic disease in the setting of a known primary neoplasm. Attention on follow-up MRI. Air-fluid levels with aerated secretions in the sphenoid sinuses and ethmoid air cells, which may relate to intubated status versus underlying pre-existing acute sinusitis. Xr Chest Portable    Result Date: 1/4/2021  EXAMINATION: ONE XRAY VIEW OF THE CHEST 1/4/2021 11:38 am COMPARISON: 10/24/2020 HISTORY: ORDERING SYSTEM PROVIDED HISTORY: post intubation TECHNOLOGIST PROVIDED HISTORY: post intubation Reason for Exam: LOC. NG tube & ET tube placed Acuity: Acute Type of Exam: Initial FINDINGS: Endotracheal tube is in place with its tip terminating approximately 4.4 cm above the alejandro. Enteric tube courses below the left hemidiaphragm. A left basilar pigtail chest tube catheter noted in place. There is a residual small to moderate-sized left-sided effusion with adjacent airspace disease. Right hemithorax is clear. Vascular port terminates overlying the expected location of the SVC. No convincing evidence for pneumothorax. Vascular coiling identified within the upper abdomen. Small to moderate left pleural effusion with adjacent airspace disease. Left-sided chest tube terminates overlying the left lung base. Endotracheal and enteric tubes as above.        LABS:  Results for orders placed or performed during the hospital encounter of 01/04/21   Basic Metabolic Panel   Result Value Ref Range    Glucose 361 (H) 70 - 99 mg/dL    BUN 5 (L) 6 - 20 mg/dL    CREATININE 0.50 0.50 - 0.90 mg/dL    Bun/Cre Ratio NOT REPORTED 9 - 20    Calcium 8.7 8.6 - 10.4 mg/dL    Sodium 138 135 - 144 mmol/L    Potassium 3.5 (L) 3.7 - 5.3 mmol/L    Chloride 102 98 - 107 mmol/L    CO2 20 20 - 31 mmol/L    Anion Gap 16 9 - 17 mmol/L    GFR Non-African American >60 >60 mL/min    GFR African American >60 >60 mL/min    GFR Comment          GFR Staging NOT REPORTED    CBC Auto Differential   Result Value Ref Range    WBC 32.2 (HH) 3.5 - 11.0 k/uL    RBC 4.65 4.0 - 5.2 m/uL    Hemoglobin 10.5 (L) 12.0 - 16.0 g/dL    Hematocrit 35.3 (L) 36 - 46 %    MCV 76.0 (L) 80 - 100 fL    MCH 22.5 (L) 26 - 34 pg    MCHC 29.6 (L) 31 - 37 g/dL    RDW 25.3 (H) 12.5 - 15.4 %    Platelets 6,547 (HH) 140 - 450 k/uL    MPV 6.3 6.0 - 12.0 fL    NRBC Automated NOT REPORTED per 100 WBC    Differential Type NOT REPORTED     Immature Granulocytes NOT REPORTED 0 %    Absolute Immature Granulocyte NOT REPORTED 0.00 - 0.30 k/uL    WBC Morphology NOT REPORTED     RBC Morphology NOT REPORTED     Platelet Estimate NOT REPORTED     Seg Neutrophils 92 (H) 36 - 66 %    Lymphocytes 6 (L) 24 - 44 %    Monocytes 1 1 - 7 %    Eosinophils % 1 1 - 4 %    Basophils 0 0 - 2 %    Segs Absolute 29.63 (H) 1.8 - 7.7 k/uL    Absolute Lymph # 1.93 1.0 - 4.8 k/uL    Absolute Mono # 0.32 0.1 - 0.8 k/uL    Absolute Eos # 0.32 0.0 - 0.4 k/uL    Basophils Absolute 0.00 0.0 - 0.2 k/uL    Morphology HYPOCHROMIA PRESENT     Morphology MICROCYTOSIS PRESENT     Morphology ANISOCYTOSIS PRESENT    Troponin   Result Value Ref Range    Troponin, High Sensitivity 44 (H) 0 - 14 ng/L    Troponin T NOT REPORTED <0.03 ng/mL    Troponin Interp NOT REPORTED    Urinalysis Reflex to Culture   Result Value Ref Range    Color, UA YELLOW YELLOW    Turbidity UA CLEAR CLEAR    Glucose, Ur 1+ (A) NEGATIVE    Bilirubin Urine NEGATIVE NEGATIVE    Ketones, Urine SMALL (A) NEGATIVE    Specific Gravity, UA 1.025 1.005 - 1.030    Urine Hgb NEGATIVE NEGATIVE    pH, UA 6.5 5.0 - 8.0    Protein, UA NEGATIVE NEGATIVE    Urobilinogen, Urine Normal Normal    Nitrite, Urine NEGATIVE NEGATIVE    Leukocyte Esterase, Urine NEGATIVE NEGATIVE    Urinalysis Comments       Microscopic exam not performed based on chemical results unless requested in original order. Urinalysis Comments          Urinalysis Comments       Utilizing a urinalysis as the only screening method to exclude a potential uropathogen can be unreliable in many patient populations. Rapid screening tests are less sensitive than culture and if UTI is a clinical possibility, culture should be considered despite a negative urinalysis. Lactate, Sepsis   Result Value Ref Range    Lactic Acid, Sepsis 8.8 (H) 0.5 - 1.9 mmol/L    Lactic Acid, Sepsis, Whole Blood NOT REPORTED 0.5 - 1.9 mmol/L   Hepatic Function Panel   Result Value Ref Range    Alb 3.1 (L) 3.5 - 5.2 g/dL    Alkaline Phosphatase 93 35 - 104 U/L    ALT <5 (L) 5 - 33 U/L    AST 24 <32 U/L    Total Bilirubin 0.20 (L) 0.3 - 1.2 mg/dL    Bilirubin, Direct <0.08 <0.31 mg/dL    Bilirubin, Indirect CANNOT BE CALCULATED 0.00 - 1.00 mg/dL    Total Protein 7.0 6.4 - 8.3 g/dL    Globulin NOT REPORTED 1.5 - 3.8 g/dL    Albumin/Globulin Ratio 0.8 (L) 1.0 - 2.5   Ammonia   Result Value Ref Range    Ammonia 110 (H) 11 - 51 umol/L   Levetiracetam Level   Result Value Ref Range    Levetiracetam Lvl 13 ug/mL   COVID-19    Specimen: Other   Result Value Ref Range    SARS-CoV-2          SARS-CoV-2, Rapid Not Detected Not Detected    Source . NASOPHARYNGEAL SWAB     SARS-CoV-2         EKG 12 Lead   Result Value Ref Range    Ventricular Rate 95 BPM    Atrial Rate 95 BPM    P-R Interval 158 ms    QRS Duration 86 ms    Q-T Interval 402 ms    QTc Calculation (Bazett) 505 ms    P Axis 70 degrees    R Axis 90 degrees    T Axis 51 degrees           EMERGENCY DEPARTMENT COURSE:   Vitals:    Vitals: 01/04/21 1423 01/04/21 1451 01/04/21 1454 01/04/21 1549   BP: (!) 145/91  (!) 154/81    Pulse: 91  87    Resp: 12  12    Temp:       TempSrc:       SpO2: 100%  100% 100%   Weight:       Height:  5' 3\" (1.6 m)       -------------------------  BP: (!) 154/81, Temp: 97.5 °F (36.4 °C), Pulse: 87, Resp: 12      RE-EVALUATION:  Sepsis Times and Checklist  Vital Signs: BP: (!) 154/81  Pulse: 87  Resp: 12  Temp: 97.5 °F (36.4 °C) SpO2: 100 %  SIRS (>2)   Temp > 38.3C or < 36C   HR > 90   RR > 20   WBC > 12 or < 4 or >10% bands  SIRS (>2) and confirmed or suspected source of infection = Sepsis    Intra - Abdominal Source  Mild to Moderate Severity = Ceftriaxone 1 gm IV plus Metronidazole 500 mg IV, if Penicillin allergy then Levofloxacin 500 mg IV  High Severity or High Risk = Zosyn 3.375 gm IV or Cefepime 2 gm IV plus Metronidazole 500 mg IV, if Penicillin allergy then Levofloxacin 500 mg IV    Meningitis  Ceftriaxone 2 gm IV plus Vancomycin 25 mg/kg IV, if Penicillin allergy then Levofloxacin 500 mg IV plus Vancomycin 25 mg/kg IV   If concern for viral meningitis then add Acyclovir 10 mg/kg IV using ideal body weight  If concern for Pneumococcal meningitis then Dexamethasone 10 mg IV prior to antibiotics  If concern for Listeria based on CSF gram stain then add Ampicillin 2 gm IV or Bactrim 5 mg/kg IV if severe Penicillin allergy    Urosepsis  Community acquired = Ceftriaxone 1 gm IV, if Penicillin allergy then Meropenem 1 gm IV  Hospital acquired = Zosyn 3.375 gm IV or Cefepime 1 gm IV, if Penicillin allergy then Meropenem 1 gm IV    Skin / Soft Tissue  Cellulitis / Erysipelas = Ceftriaxone 1 gm IV plus Vancomycin 15 mg/kg IV, if Penicillin allergy then Levofloxacin 500 gm IV  Necrotizing infection / Severe Cellulitis = Zosyn 3.375 gm IV plus Vancomycin 15 mg/kg IV, if Penicillin allergy then Clindamycin 900 mg IV plus Vancomycin 15 mg/kg IV plus Ciprofloxacin 500 mg IV    Respiratory  Community Acquired Pneumonia = Ceftriaxone 1 gm IV plus Azithromycin 500 mg IV, if Penicillin allergy then Levofloxacin 750 mg IV. Hospital Acquired Pneumonia = Zosyn 4.5 gm IV plus Vancomycin 15 mg/kg IV, if Penicillin allergy then Meropenem 1 gm IV plus Vancomycin 15 mg/kg IV    Unknown Source  Zosyn 3.375 gm IV plus Vancomycin 15 mg/kg IV, if Penicillin allergy then Meropenem 1 gm IV plus Vancomycin 15 mg/kg IV        CONSULTS:  Critical Care Dr. Shelia Dennis at [facility] [address]. [facility] is kind enough to admit the patient to their service. CRITICAL CARE: There was a high probability of clinically significant/life threatening deterioration in this patient's condition which required my urgent intervention. Total critical care time was 95 minutes. This excludes any time for separately reportable procedures.      PROCEDURES:  EMS appears to have attempted a nasal intubation but without success patient noted to have some bleeding from the naris patient was in status when she initially arrived patient already had an IV established she was given etomidate we did attempt to intubate with the etomidate but without success patient then is given succinylcholine and using a glide scope the patient is successfully intubated the patient is started on a propofol drip she had been given 4 mg of Versed by EMS we gave her 1 mg/kg of ketamine to augment the propofol the patient's blood pressure improved heart rate improved oxygen saturation improved oxygen saturation is at 100% heart rate has dropped into the 80s to 90s when she initially she arrived it was 140 end-tidal CO2 initially was in the 50s and now it is in the upper 30s to low 40s so we are improving on her ventilation status the patient noted to have a profoundly elevated white blood cell count profoundly elevated lactic acid she is given 3 L of fluid and we did start her empirically on Zosyn and vancomycin I did discuss the patient with critical care and they have accepted the patient there are no ICU beds currently available at [facility] so the option is made to transfer her from ED to ED I next discussed the patient with the ED attending at [address] [place] [address]. [facility] and they are kind enough to accept the patient in transfer  Condition on transfer is guarded family members are aware of the patient's critical status    FINAL IMPRESSION      1. Status epilepticus ([address]    2. Coma, unspecified coma depth (HCC)    3. Elevated lactic acid level    4. Hyperglycemia    5. Leukocytosis, unspecified type    6. Elevated troponin          DISPOSITION/PLAN   DISPOSITION        CONDITION ON DISPOSITION:   Guraded    PATIENT REFERRED TO:  No follow-up provider specified. DISCHARGE MEDICATIONS:  Discharge Medication List as of 1/4/2021  3:52 PM          (Please note that portions of this note were completed with a voicerecognition program.  Efforts were made to edit the dictations but occasionally words are mis-transcribed.)    Cerda MD, F.A.C.E.P.   Attending Emergency Medicine Physician       Gary Polanco MD  01/04/21 9974       Gary Polanco MD  01/05/21 5308

## 2021-01-04 NOTE — ED NOTES
Pt linens changed, fresh gown placed,no brief placed. EEG tech arrives to bedside.       Cecelia Lay RN  01/04/21 3853

## 2021-01-04 NOTE — ED NOTES
Bed: 22  Expected date:   Expected time:   Means of arrival:   Comments:  [address], RN  01/04/21 5056

## 2021-01-04 NOTE — ED NOTES
[facility] and en[facility] to [address].  ETT 23 CM  [facility], RN  01/04/21 8140

## 2021-01-04 NOTE — ED PROVIDER NOTES
[facility] HANDOFF       Handoff taken on the following patient from prior Attending Physician:  Pt Name: Diana Escalona  PCP:  No primary care provider on file. Attestation  I was available and discussed any additional care issues that arose and coordinated the management plans with the resident(s) caring for the patient during my duty period. Any areas of disagreement with resident's documentation of care or procedures are noted on the chart. I was personally present for the key portions of any/all procedures during my duty period. I have documented in the chart those procedures where I was not present during the key portions. CHIEF COMPLAINT       Chief Complaint   Patient presents with    Seizures     [place]         CURRENT MEDICATIONS     Previous Medications  Previous Medications    AMLODIPINE (NORVASC) 10 MG TABLET    Take 1 tablet by mouth daily    ASPIRIN 81 MG CHEWABLE TABLET    Take 81 mg by mouth nightly    FERROUS SULFATE (IRON 325) 325 (65 FE) MG TABLET    Take 1 tablet by mouth daily (with breakfast)    HYDROCODONE-ACETAMINOPHEN (NORCO) 5-325 MG PER TABLET    Take 1 tablet by mouth every 6 hours as needed for Pain. IBUPROFEN (ADVIL;MOTRIN) 200 MG TABLET    Take 200 mg by mouth every 6 hours as needed for Pain    LEVETIRACETAM (KEPPRA) 750 MG TABLET    Take 2 tablets by mouth 2 times daily    METFORMIN (GLUCOPHAGE-XR) 500 MG EXTENDED RELEASE TABLET    Take 2 tablets by mouth 2 times daily    OMEPRAZOLE (PRILOSEC) 20 MG DELAYED RELEASE CAPSULE    Take 20 mg by mouth daily    OXYCODONE (OXYCONTIN) 10 MG EXTENDED RELEASE TABLET    Take 1 tablet by mouth every 12 hours for 7 days. OXYCODONE-ACETAMINOPHEN (PERCOCET)  MG PER TABLET    Take 1 tablet by mouth every 4 hours for 7 days.     PANTOPRAZOLE (PROTONIX) 40 MG TABLET    Take 40 mg by mouth 2 times daily PROCHLORPERAZINE (COMPAZINE) 10 MG TABLET    Take 10 mg by mouth every 6 hours as needed    SERTRALINE (ZOLOFT) 100 MG TABLET    Take 50 mg by mouth daily     SIMVASTATIN (ZOCOR) 40 MG TABLET    Take 40 mg by mouth nightly    TIZANIDINE (ZANAFLEX) 4 MG TABLET    Take 4 mg by mouth every 6 hours as needed       Encounter Medications  Orders Placed This Encounter   Medications    levETIRAcetam (KEPPRA) 2,250 mg in sodium chloride 0.9 % 100 mL IVPB    levETIRAcetam in NaCl (KEPPRA) 1000 MG/100ML solution     TORY GONZALEZ: cabinet override    chlorhexidine (PERIDEX) 0.12 % solution 15 mL    famotidine (PEPCID) injection 20 mg    propofol injection       ALLERGIES     has No Known Allergies. RECENT VITALS:   Temp: 98.8 °F (37.1 °C),  Pulse: 98, Resp: 26, BP: 136/79    RADIOLOGY:   XR CHEST PORTABLE   Final Result   ET tube as above. Life support apparatus otherwise stable. Mild-to-moderate   edema and small left effusion unchanged.            EKG Interpretation    Interpreted by me    Rhythm: normal sinus   Rate: Tachycardia  Axis: normal  Ectopy: none  Conduction: normal  ST Segments: no acute change  T Waves: Diffuse flattening  Q Waves: Septal  Low voltage    Clinical Impression: Nonspecific, possible old anterior MI    LABS:  Labs Reviewed   TROPONIN - Abnormal; Notable for the following components:       Result Value    Troponin, High Sensitivity 139 (*)     All other components within normal limits   CBC WITH AUTO DIFFERENTIAL - Abnormal; Notable for the following components:    WBC 31.4 (*)     Hemoglobin 10.7 (*)     MCV 79.3 (*)     MCH 22.9 (*)     RDW 24.9 (*)     Platelets 441 (*)     Seg Neutrophils 96 (*)     Lymphocytes 3 (*)     Monocytes 0 (*)     Eosinophils % 0 (*)     Segs Absolute 30.15 (*)     Absolute Lymph # 0.94 (*)     Absolute Mono # 0.00 (*)     Basophils Absolute 0.31 (*)     All other components within normal limits ARTERIAL BLOOD GAS, POC - Abnormal; Notable for the following components:    POC PO2 224.6 (*)     POC O2  (*)     All other components within normal limits   CULTURE, RESPIRATORY   LACTATE, SEPSIS   URINE RT REFLEX TO CULTURE     Known metastatic ovarian cancer and seizure history. Status epilepticus. Intubated by prehospital nasotracheally, this was converted to orotracheal intubation at the outside hospital.  She was loaded with Keppra and transferred here. Here, tapering down on her sedation she has ability to follow commands. No evidence of persistent seizures. Neurology/neurosurgery consulted. PLAN/ TASKS OUTSTANDING       Admission to neuro ICU, awaiting bed. (Please note that portions of this note were completed with a voice recognition program.  Efforts were made to edit the dictations but occasionally words are mis-transcribed. )    Hart MD, F.A.C.E.P.   Attending Emergency Physician       Pierre Del Rosario MD  01/04/21 1514       Pierre Del Rosario MD  01/04/21 1255

## 2021-01-05 ENCOUNTER — APPOINTMENT (OUTPATIENT)
Dept: CT IMAGING | Age: 56
DRG: 871 | End: 2021-01-05
Payer: COMMERCIAL

## 2021-01-05 ENCOUNTER — APPOINTMENT (OUTPATIENT)
Dept: MRI IMAGING | Age: 56
DRG: 871 | End: 2021-01-05
Payer: COMMERCIAL

## 2021-01-05 ENCOUNTER — APPOINTMENT (OUTPATIENT)
Dept: GENERAL RADIOLOGY | Age: 56
DRG: 871 | End: 2021-01-05
Payer: COMMERCIAL

## 2021-01-05 LAB
-: ABNORMAL
-: NORMAL
ABSOLUTE EOS #: 0 K/UL (ref 0–0.4)
ABSOLUTE IMMATURE GRANULOCYTE: 0 K/UL (ref 0–0.3)
ABSOLUTE LYMPH #: 2.99 K/UL (ref 1–4.8)
ABSOLUTE MONO #: 1.39 K/UL (ref 0.1–0.8)
ALBUMIN SERPL-MCNC: 3 G/DL (ref 3.5–5.2)
ALBUMIN/GLOBULIN RATIO: 0.9 (ref 1–2.5)
ALLEN TEST: POSITIVE
ALP BLD-CCNC: 87 U/L (ref 35–104)
ALT SERPL-CCNC: 6 U/L (ref 5–33)
AMORPHOUS: ABNORMAL
ANION GAP SERPL CALCULATED.3IONS-SCNC: 14 MMOL/L (ref 9–17)
APPEARANCE FLUID: NORMAL
AST SERPL-CCNC: 24 U/L
BACTERIA: ABNORMAL
BASO FLUID: NORMAL %
BASOPHILS # BLD: 1 % (ref 0–2)
BASOPHILS ABSOLUTE: 0.2 K/UL (ref 0–0.2)
BILIRUB SERPL-MCNC: 0.18 MG/DL (ref 0.3–1.2)
BILIRUBIN DIRECT: 0.11 MG/DL
BILIRUBIN URINE: NEGATIVE
BILIRUBIN, INDIRECT: 0.07 MG/DL (ref 0–1)
BUN BLDV-MCNC: 2 MG/DL (ref 6–20)
BUN/CREAT BLD: ABNORMAL (ref 9–20)
CA 125: 409 U/ML
CALCIUM IONIZED: 0.96 MMOL/L (ref 1.13–1.33)
CALCIUM SERPL-MCNC: 8.2 MG/DL (ref 8.6–10.4)
CASE NUMBER:: NORMAL
CASTS UA: ABNORMAL /LPF (ref 0–8)
CHLORIDE BLD-SCNC: 96 MMOL/L (ref 98–107)
CO2: 22 MMOL/L (ref 20–31)
COLOR FLUID: NORMAL
COLOR: YELLOW
CREAT SERPL-MCNC: 0.21 MG/DL (ref 0.5–0.9)
CRYSTALS, UA: ABNORMAL /HPF
DIFFERENTIAL TYPE: ABNORMAL
EKG ATRIAL RATE: 115 BPM
EKG ATRIAL RATE: 116 BPM
EKG ATRIAL RATE: 90 BPM
EKG ATRIAL RATE: 95 BPM
EKG P AXIS: 55 DEGREES
EKG P AXIS: 70 DEGREES
EKG P AXIS: 72 DEGREES
EKG P AXIS: 77 DEGREES
EKG P-R INTERVAL: 154 MS
EKG P-R INTERVAL: 156 MS
EKG P-R INTERVAL: 158 MS
EKG P-R INTERVAL: 158 MS
EKG Q-T INTERVAL: 288 MS
EKG Q-T INTERVAL: 290 MS
EKG Q-T INTERVAL: 372 MS
EKG Q-T INTERVAL: 402 MS
EKG QRS DURATION: 82 MS
EKG QRS DURATION: 84 MS
EKG QRS DURATION: 86 MS
EKG QRS DURATION: 90 MS
EKG QTC CALCULATION (BAZETT): 400 MS
EKG QTC CALCULATION (BAZETT): 401 MS
EKG QTC CALCULATION (BAZETT): 455 MS
EKG QTC CALCULATION (BAZETT): 505 MS
EKG R AXIS: -5 DEGREES
EKG R AXIS: 1 DEGREES
EKG R AXIS: 52 DEGREES
EKG R AXIS: 90 DEGREES
EKG T AXIS: 51 DEGREES
EKG T AXIS: 57 DEGREES
EKG T AXIS: 62 DEGREES
EKG T AXIS: 81 DEGREES
EKG VENTRICULAR RATE: 115 BPM
EKG VENTRICULAR RATE: 116 BPM
EKG VENTRICULAR RATE: 90 BPM
EKG VENTRICULAR RATE: 95 BPM
EOSINOPHIL FLUID: NORMAL %
EOSINOPHILS RELATIVE PERCENT: 0 % (ref 1–4)
EPITHELIAL CELLS UA: ABNORMAL /HPF (ref 0–5)
FIO2: 30
FLUID DIFF COMMENT: NORMAL
GFR AFRICAN AMERICAN: >60 ML/MIN
GFR NON-AFRICAN AMERICAN: >60 ML/MIN
GFR SERPL CREATININE-BSD FRML MDRD: ABNORMAL ML/MIN/{1.73_M2}
GFR SERPL CREATININE-BSD FRML MDRD: ABNORMAL ML/MIN/{1.73_M2}
GLOBULIN: ABNORMAL G/DL (ref 1.5–3.8)
GLUCOSE BLD-MCNC: 118 MG/DL (ref 70–99)
GLUCOSE BLD-MCNC: 129 MG/DL (ref 74–100)
GLUCOSE URINE: NEGATIVE
HCT VFR BLD CALC: 36.4 % (ref 36.3–47.1)
HEMOGLOBIN: 10.7 G/DL (ref 11.9–15.1)
IMMATURE GRANULOCYTES: 0 %
KETONES, URINE: ABNORMAL
LACTATE DEHYDROGENASE, FLUID: NORMAL U/L
LACTIC ACID, WHOLE BLOOD: 1 MMOL/L (ref 0.7–2.1)
LACTIC ACID, WHOLE BLOOD: 1.3 MMOL/L (ref 0.7–2.1)
LACTIC ACID, WHOLE BLOOD: 1.5 MMOL/L (ref 0.7–2.1)
LACTIC ACID, WHOLE BLOOD: 1.6 MMOL/L (ref 0.7–2.1)
LACTIC ACID: NORMAL MMOL/L
LEUKOCYTE ESTERASE, URINE: NEGATIVE
LYMPHOCYTES # BLD: 15 % (ref 24–44)
LYMPHOCYTES, BODY FLUID: NORMAL %
MAGNESIUM: 1.6 MG/DL (ref 1.6–2.6)
MCH RBC QN AUTO: 22.7 PG (ref 25.2–33.5)
MCHC RBC AUTO-ENTMCNC: 29.4 G/DL (ref 28.4–34.8)
MCV RBC AUTO: 77.3 FL (ref 82.6–102.9)
MODE: ABNORMAL
MONOCYTE, FLUID: NORMAL %
MONOCYTES # BLD: 7 % (ref 1–7)
MORPHOLOGY: ABNORMAL
MORPHOLOGY: ABNORMAL
MRSA, DNA, NASAL: NORMAL
MUCUS: ABNORMAL
NEGATIVE BASE EXCESS, ART: ABNORMAL (ref 0–2)
NEUTROPHIL, FLUID: NORMAL %
NITRITE, URINE: NEGATIVE
NRBC AUTOMATED: 0 PER 100 WBC
O2 DEVICE/FLOW/%: ABNORMAL
OTHER CELLS FLUID: NORMAL %
OTHER OBSERVATIONS UA: ABNORMAL
PARTIAL THROMBOPLASTIN TIME: 22.8 SEC (ref 20.5–30.5)
PARTIAL THROMBOPLASTIN TIME: 23.2 SEC (ref 20.5–30.5)
PARTIAL THROMBOPLASTIN TIME: 24.4 SEC (ref 20.5–30.5)
PARTIAL THROMBOPLASTIN TIME: 26.3 SEC (ref 20.5–30.5)
PATIENT TEMP: ABNORMAL
PDW BLD-RTO: 24.8 % (ref 11.8–14.4)
PH UA: 6.5 (ref 5–8)
PLATELET # BLD: 952 K/UL (ref 138–453)
PLATELET ESTIMATE: ABNORMAL
PMV BLD AUTO: 8.3 FL (ref 8.1–13.5)
POC HCO3: 29.1 MMOL/L (ref 21–28)
POC O2 SATURATION: 98 % (ref 94–98)
POC PCO2 TEMP: ABNORMAL MM HG
POC PCO2: 42.2 MM HG (ref 35–48)
POC PH TEMP: ABNORMAL
POC PH: 7.45 (ref 7.35–7.45)
POC PO2 TEMP: ABNORMAL MM HG
POC PO2: 94.1 MM HG (ref 83–108)
POSITIVE BASE EXCESS, ART: 5 (ref 0–3)
POTASSIUM SERPL-SCNC: 3.1 MMOL/L (ref 3.7–5.3)
PROTEIN UA: NEGATIVE
RBC # BLD: 4.71 M/UL (ref 3.95–5.11)
RBC # BLD: ABNORMAL 10*6/UL
RBC FLUID: NORMAL /MM3
RBC UA: ABNORMAL /HPF (ref 0–4)
REASON FOR REJECTION: NORMAL
RENAL EPITHELIAL, UA: ABNORMAL /HPF
SAMPLE SITE: ABNORMAL
SEG NEUTROPHILS: 77 % (ref 36–66)
SEGMENTED NEUTROPHILS ABSOLUTE COUNT: 15.32 K/UL (ref 1.8–7.7)
SODIUM BLD-SCNC: 132 MMOL/L (ref 135–144)
SPECIFIC GRAVITY UA: 1.01 (ref 1–1.03)
SPECIMEN DESCRIPTION: NORMAL
SPECIMEN DESCRIPTION: NORMAL
SPECIMEN TYPE: NORMAL
SPECIMEN TYPE: NORMAL
TCO2 (CALC), ART: 30 MMOL/L (ref 22–29)
TOTAL PROTEIN: 6.5 G/DL (ref 6.4–8.3)
TRICHOMONAS: ABNORMAL
TROPONIN INTERP: ABNORMAL
TROPONIN INTERP: ABNORMAL
TROPONIN T: ABNORMAL NG/ML
TROPONIN T: ABNORMAL NG/ML
TROPONIN, HIGH SENSITIVITY: 159 NG/L (ref 0–14)
TROPONIN, HIGH SENSITIVITY: 169 NG/L (ref 0–14)
TURBIDITY: ABNORMAL
URINE HGB: ABNORMAL
UROBILINOGEN, URINE: NORMAL
WBC # BLD: 19.9 K/UL (ref 3.5–11.3)
WBC # BLD: ABNORMAL 10*3/UL
WBC FLUID: NORMAL /MM3
WBC UA: ABNORMAL /HPF (ref 0–5)
YEAST: ABNORMAL
ZZ NTE CLEAN UP: ORDERED TEST: NORMAL
ZZ NTE WITH NAME CLEAN UP: SPECIMEN SOURCE: NORMAL

## 2021-01-05 PROCEDURE — 6360000002 HC RX W HCPCS: Performed by: STUDENT IN AN ORGANIZED HEALTH CARE EDUCATION/TRAINING PROGRAM

## 2021-01-05 PROCEDURE — 2700000000 HC OXYGEN THERAPY PER DAY

## 2021-01-05 PROCEDURE — 6370000000 HC RX 637 (ALT 250 FOR IP): Performed by: STUDENT IN AN ORGANIZED HEALTH CARE EDUCATION/TRAINING PROGRAM

## 2021-01-05 PROCEDURE — 36415 COLL VENOUS BLD VENIPUNCTURE: CPT

## 2021-01-05 PROCEDURE — 87077 CULTURE AEROBIC IDENTIFY: CPT

## 2021-01-05 PROCEDURE — 94003 VENT MGMT INPAT SUBQ DAY: CPT

## 2021-01-05 PROCEDURE — 87086 URINE CULTURE/COLONY COUNT: CPT

## 2021-01-05 PROCEDURE — 6360000002 HC RX W HCPCS: Performed by: NURSE PRACTITIONER

## 2021-01-05 PROCEDURE — 95720 EEG PHY/QHP EA INCR W/VEEG: CPT | Performed by: PSYCHIATRY & NEUROLOGY

## 2021-01-05 PROCEDURE — 83605 ASSAY OF LACTIC ACID: CPT

## 2021-01-05 PROCEDURE — 6360000004 HC RX CONTRAST MEDICATION: Performed by: PSYCHIATRY & NEUROLOGY

## 2021-01-05 PROCEDURE — 95714 VEEG EA 12-26 HR UNMNTR: CPT

## 2021-01-05 PROCEDURE — 84484 ASSAY OF TROPONIN QUANT: CPT

## 2021-01-05 PROCEDURE — 94761 N-INVAS EAR/PLS OXIMETRY MLT: CPT

## 2021-01-05 PROCEDURE — 70553 MRI BRAIN STEM W/O & W/DYE: CPT

## 2021-01-05 PROCEDURE — 6360000004 HC RX CONTRAST MEDICATION: Performed by: NURSE PRACTITIONER

## 2021-01-05 PROCEDURE — 86304 IMMUNOASSAY TUMOR CA 125: CPT

## 2021-01-05 PROCEDURE — 2580000003 HC RX 258: Performed by: INTERNAL MEDICINE

## 2021-01-05 PROCEDURE — 80048 BASIC METABOLIC PNL TOTAL CA: CPT

## 2021-01-05 PROCEDURE — A9576 INJ PROHANCE MULTIPACK: HCPCS | Performed by: NURSE PRACTITIONER

## 2021-01-05 PROCEDURE — 82947 ASSAY GLUCOSE BLOOD QUANT: CPT

## 2021-01-05 PROCEDURE — 81001 URINALYSIS AUTO W/SCOPE: CPT

## 2021-01-05 PROCEDURE — 85730 THROMBOPLASTIN TIME PARTIAL: CPT

## 2021-01-05 PROCEDURE — 93005 ELECTROCARDIOGRAM TRACING: CPT | Performed by: STUDENT IN AN ORGANIZED HEALTH CARE EDUCATION/TRAINING PROGRAM

## 2021-01-05 PROCEDURE — 71260 CT THORAX DX C+: CPT

## 2021-01-05 PROCEDURE — 6360000002 HC RX W HCPCS: Performed by: INTERNAL MEDICINE

## 2021-01-05 PROCEDURE — 2000000003 HC NEURO ICU R&B

## 2021-01-05 PROCEDURE — 99291 CRITICAL CARE FIRST HOUR: CPT | Performed by: PSYCHIATRY & NEUROLOGY

## 2021-01-05 PROCEDURE — 85025 COMPLETE CBC W/AUTO DIFF WBC: CPT

## 2021-01-05 PROCEDURE — 87205 SMEAR GRAM STAIN: CPT

## 2021-01-05 PROCEDURE — 2580000003 HC RX 258: Performed by: NURSE PRACTITIONER

## 2021-01-05 PROCEDURE — 83735 ASSAY OF MAGNESIUM: CPT

## 2021-01-05 PROCEDURE — C9113 INJ PANTOPRAZOLE SODIUM, VIA: HCPCS | Performed by: STUDENT IN AN ORGANIZED HEALTH CARE EDUCATION/TRAINING PROGRAM

## 2021-01-05 PROCEDURE — 80076 HEPATIC FUNCTION PANEL: CPT

## 2021-01-05 PROCEDURE — 71045 X-RAY EXAM CHEST 1 VIEW: CPT

## 2021-01-05 PROCEDURE — 87040 BLOOD CULTURE FOR BACTERIA: CPT

## 2021-01-05 PROCEDURE — 82803 BLOOD GASES ANY COMBINATION: CPT

## 2021-01-05 PROCEDURE — 82330 ASSAY OF CALCIUM: CPT

## 2021-01-05 PROCEDURE — 6370000000 HC RX 637 (ALT 250 FOR IP): Performed by: NURSE PRACTITIONER

## 2021-01-05 PROCEDURE — 99255 IP/OBS CONSLTJ NEW/EST HI 80: CPT | Performed by: INTERNAL MEDICINE

## 2021-01-05 PROCEDURE — 36600 WITHDRAWAL OF ARTERIAL BLOOD: CPT

## 2021-01-05 PROCEDURE — 87186 SC STD MICRODIL/AGAR DIL: CPT

## 2021-01-05 PROCEDURE — 99232 SBSQ HOSP IP/OBS MODERATE 35: CPT | Performed by: INTERNAL MEDICINE

## 2021-01-05 RX ORDER — SODIUM CHLORIDE 0.9 % (FLUSH) 0.9 %
10 SYRINGE (ML) INJECTION PRN
Status: DISCONTINUED | OUTPATIENT
Start: 2021-01-05 | End: 2021-01-14 | Stop reason: HOSPADM

## 2021-01-05 RX ORDER — POTASSIUM CHLORIDE 7.45 MG/ML
10 INJECTION INTRAVENOUS
Status: COMPLETED | OUTPATIENT
Start: 2021-01-05 | End: 2021-01-05

## 2021-01-05 RX ORDER — MAGNESIUM SULFATE IN WATER 40 MG/ML
2 INJECTION, SOLUTION INTRAVENOUS ONCE
Status: COMPLETED | OUTPATIENT
Start: 2021-01-05 | End: 2021-01-05

## 2021-01-05 RX ORDER — HEPARIN SODIUM 10000 [USP'U]/100ML
12 INJECTION, SOLUTION INTRAVENOUS CONTINUOUS
Status: DISCONTINUED | OUTPATIENT
Start: 2021-01-05 | End: 2021-01-05

## 2021-01-05 RX ADMIN — PANTOPRAZOLE SODIUM 40 MG: 40 INJECTION, POWDER, LYOPHILIZED, FOR SOLUTION INTRAVENOUS at 08:18

## 2021-01-05 RX ADMIN — PIPERACILLIN AND TAZOBACTAM 3.38 G: 3; .375 INJECTION, POWDER, FOR SOLUTION INTRAVENOUS at 05:20

## 2021-01-05 RX ADMIN — MAGNESIUM SULFATE 2 G: 2 INJECTION INTRAVENOUS at 12:39

## 2021-01-05 RX ADMIN — GADOTERIDOL 14 ML: 279.3 INJECTION, SOLUTION INTRAVENOUS at 12:18

## 2021-01-05 RX ADMIN — POTASSIUM CHLORIDE 10 MEQ: 10 INJECTION, SOLUTION INTRAVENOUS at 13:53

## 2021-01-05 RX ADMIN — PROPOFOL 40 MCG/KG/MIN: 10 INJECTION, EMULSION INTRAVENOUS at 06:51

## 2021-01-05 RX ADMIN — PROPOFOL 60 MCG/KG/MIN: 10 INJECTION, EMULSION INTRAVENOUS at 02:24

## 2021-01-05 RX ADMIN — POTASSIUM BICARBONATE 40 MEQ: 782 TABLET, EFFERVESCENT ORAL at 12:39

## 2021-01-05 RX ADMIN — SODIUM CHLORIDE, PRESERVATIVE FREE 10 ML: 5 INJECTION INTRAVENOUS at 21:08

## 2021-01-05 RX ADMIN — PROPOFOL 50 MCG/KG/MIN: 10 INJECTION, EMULSION INTRAVENOUS at 16:46

## 2021-01-05 RX ADMIN — LEVETIRACETAM 1500 MG: 15 INJECTION INTRAVENOUS at 09:23

## 2021-01-05 RX ADMIN — PROPOFOL 50 MCG/KG/MIN: 10 INJECTION, EMULSION INTRAVENOUS at 12:53

## 2021-01-05 RX ADMIN — PANTOPRAZOLE SODIUM 40 MG: 40 INJECTION, POWDER, LYOPHILIZED, FOR SOLUTION INTRAVENOUS at 20:16

## 2021-01-05 RX ADMIN — MEROPENEM 1 G: 1 INJECTION, POWDER, FOR SOLUTION INTRAVENOUS at 17:29

## 2021-01-05 RX ADMIN — SERTRALINE 50 MG: 50 TABLET, FILM COATED ORAL at 08:17

## 2021-01-05 RX ADMIN — ATORVASTATIN CALCIUM 20 MG: 20 TABLET, FILM COATED ORAL at 08:17

## 2021-01-05 RX ADMIN — IOPAMIDOL 75 ML: 755 INJECTION, SOLUTION INTRAVENOUS at 15:51

## 2021-01-05 RX ADMIN — POTASSIUM CHLORIDE 10 MEQ: 10 INJECTION, SOLUTION INTRAVENOUS at 12:40

## 2021-01-05 RX ADMIN — IOHEXOL 50 ML: 240 INJECTION, SOLUTION INTRATHECAL; INTRAVASCULAR; INTRAVENOUS; ORAL at 14:27

## 2021-01-05 RX ADMIN — PIPERACILLIN AND TAZOBACTAM 3.38 G: 3; .375 INJECTION, POWDER, FOR SOLUTION INTRAVENOUS at 14:34

## 2021-01-05 RX ADMIN — VANCOMYCIN HYDROCHLORIDE 1250 MG: 10 INJECTION, POWDER, LYOPHILIZED, FOR SOLUTION INTRAVENOUS at 12:45

## 2021-01-05 RX ADMIN — LEVETIRACETAM 1500 MG: 15 INJECTION INTRAVENOUS at 20:57

## 2021-01-05 RX ADMIN — HEPARIN SODIUM AND DEXTROSE 12 UNITS/KG/HR: 10000; 5 INJECTION INTRAVENOUS at 08:08

## 2021-01-05 RX ADMIN — POTASSIUM CHLORIDE 10 MEQ: 10 INJECTION, SOLUTION INTRAVENOUS at 13:15

## 2021-01-05 RX ADMIN — POTASSIUM CHLORIDE 10 MEQ: 10 INJECTION, SOLUTION INTRAVENOUS at 16:42

## 2021-01-05 RX ADMIN — SODIUM CHLORIDE, PRESERVATIVE FREE 10 ML: 5 INJECTION INTRAVENOUS at 08:18

## 2021-01-05 RX ADMIN — PROPOFOL 50 MCG/KG/MIN: 10 INJECTION, EMULSION INTRAVENOUS at 20:53

## 2021-01-05 RX ADMIN — FERROUS SULFATE TAB EC 325 MG (65 MG FE EQUIVALENT) 325 MG: 325 (65 FE) TABLET DELAYED RESPONSE at 08:17

## 2021-01-05 ASSESSMENT — PULMONARY FUNCTION TESTS
PIF_VALUE: 11
PIF_VALUE: 39
PIF_VALUE: 11
PIF_VALUE: 14
PIF_VALUE: 15
PIF_VALUE: 11

## 2021-01-05 NOTE — PROGRESS NOTES
Patient admitted on Mechanical Ventilator Protocol. Patients height measured at 66\" for an IBW 59.3 kg. Patient placed on the ventilator on settings as charted on flowsheeet. Ventilator Bronchodilator assessment    Breath sounds: clear  Inspiratory Pressure: 16  Plateau Pressure: 14    Patient assessed at level 1          [x]    Bronchodilator Assessment    BRONCHODILATOR ASSESSMENT SCORE  Score 0 (Home) 1 2 3 4   Breath Sounds   []  Chronic Ventilator: Patient at baseline [x]  Mild Wheezes/ Clear []  Intermittent wheezes with good air entry []  Bilateral/unilateral wheezing with diminished air entry []  Insp/Exp wheeze and/or poor aeration   Ventilator Pressures   []  Chronic Ventilator [x]  Insp. Pressure less than 25 cm H20 []  Insp. Pressure less than 25 cm H20 []  Insp. Pressure exceeds 25 cm H20 []  Insp.  Pressure exceeds 30 cm H20   Plateau Pressure []  NA   [x]  Plateau Pressure less than 4  []  Plateau Pressure less than or equal to 5 []  Plateau Pressure greater than or equal to 6 []  Plateau Pressure greater than or equal to 8       Monserrat Peters  8:57 PM

## 2021-01-05 NOTE — PROGRESS NOTES
Daily Progress Note  Neuro Critical Care    Patient Name: Lynne Hamlin [facility]  Patient : 1965  Room/Bed: 0308/2499-76  Code Status: Full Code  Allergies: No Known Allergies    CHIEF COMPLAINT:      Status epilepticus     INTERVAL HISTORY    Initial Presentation (Admitted 2021): The patient is a 60 y.o. female with history of DM, GERD, and metastatic ovarian cancer who presented as a transfer from [facility] ER for status epilepticus. Patient was found altered by family, upon EMS arrival, patient had witnessed seizure activity, was given Versed 4mg without cessation of seizure activity. Attempt at nasal intubation was unsuccessful, and patient was transported to [facility] ED. Per documentation, patient arrived to the outlying facility seizing. Intubation attempt with etomidate was unsuccessful, so succinylcholine was administered and patient was succuss fully intubated and started on Propofol. Once propofol started, clinical seizure activity resolved. Infectious workup sent for Marked leukocytosis and broad spectrum antibiotics, Vancomycin and zosyn started. She was also given 5L Lactated ringer for fluid resuscitation.      Of note, patient presented in 2020 for new onset seizures. MRI concerning for PRES. LTME at that time showed bilateral parietal occipital sharps left more right, with 4 - 10 second seizures. CSF unremarkable. Discharged home on Keppra 1500 mg BID.      Patient also had an admission at the beginning of 2020 at [facility] for severe GI bleeding requiring multiple blood transfusions with evidence of intra-abdominal mass at the splenic area with GI infiltration which was embolized with no recurrence.      Has been following with oncology for treatment of ovarian cancer metastatic disease with extensive intraabdominal and lung mets. She has been treated with Doxil, although appears to have missed last two transfusion appts. [facility] Course:  On arrival to [facility], propofol at 50 mcg/kg/min, increased to 60 mcg/kg/min due to agitation and biting on tube. With sedation held, patient opens eyes and tracks. Does not follow commands, but moving bilateral upper extremities spontaneously. NG in left nares hooked to LIWS with large amount of dark brown fluid, looks like old blood, likely from epistaxis from traumatic intubation attempt. Right nares with nasopharyngeal airway in place which was removed without any bleeding. Several blood clots suctioned from ETT. CBC reveals leukocytosis 19.9. Last 24h:   Patient pleural catheter, which was implanted approximately 2 weeks ago tenderness, appears to be purulent per overnight resident, and LDH and cell count were obtained concerning for infection as there was over 400,000 WBCs, and a LDH of 13,000. Patient also has had uptrending troponins.   [facility]'s records reveal     CURRENT MEDICATIONS:  SCHEDULED MEDICATIONS:   sodium chloride flush  10 mL Intravenous 2 times per day    ferrous sulfate  325 mg Oral Daily with breakfast    sertraline  50 mg Oral Daily    atorvastatin  20 mg Oral Daily    levetiracetam  1,500 mg Intravenous Q12H    vancomycin  1,250 mg Intravenous Q12H    piperacillin-tazobactam  3.375 g Intravenous Q8H    pantoprazole  40 mg Intravenous BID    vancomycin (VANCOCIN) intermittent dosing (placeholder)   Other RX Placeholder     CONTINUOUS INFUSIONS:   propofol 40 mcg/kg/min (21 0600)    sodium chloride 75 mL/hr at 21     PRN MEDICATIONS:   sodium chloride flush, promethazine **OR** ondansetron, polyethylene glycol, acetaminophen **OR** acetaminophen, HYDROcodone-acetaminophen    VITALS:  Temperature Range: Temp: 97.8 °F (36.6 °C) Temp  Av.4 °F (36.9 °C)  Min: 97.5 °F (36.4 °C)  Max: 99.4 °F (37.4 °C)  BP Range: Systolic (64UGD), AZX:630 , Min:103 , YQI:245     Diastolic (98YRN), BOX:11, Min:58, Max:102 Pulse Range: Pulse  Av.1  Min: 87  Max: 144  Respiration Range: Resp  Av.7  Min: 10  Max: 26  Current Pulse Ox: SpO2: 99 %  24HR Pulse Ox Range: SpO2  Av.6 %  Min: 98 %  Max: 100 %  Patient Vitals for the past 12 hrs:   BP Temp Temp src Pulse Resp SpO2 Height Weight   21 0600 138/74   102 18      21 0500 124/69   93 18      21 0400 113/73 97.8 °F (36.6 °C) Oral 91 19 99 %     21 0340    93 19 99 %     21 0300 (!) 156/90   113 25      21 0200 (!) 143/81   102 23      21 0100 (!) 158/88   107 26      21 0000 (!) 155/86 98.6 °F (37 °C) Oral 109 26 98 %     21 2317    108 22 98 %     21 2300 (!) 159/83   112 25      21 2200 (!) 140/74   98 23      21 2130       5' 6\" (1.676 m) 165 lb (74.8 kg)   21 2100 139/72   97 24      215 (!) 147/70 99.4 °F (37.4 °C) Axillary 99 23 99 %     218    100 24 100 %     21 191 (!) 143/87   97 23 100 %     21 190 (!) 142/83   94 22 100 %       Estimated body mass index is 26.63 kg/m² as calculated from the following:    Height as of this encounter: 5' 6\" (1.676 m).     Weight as of this encounter: 165 lb (74.8 kg).  []<16 Severe malnutrition  []1616.99 Moderate malnutrition  []1718.49 Mild malnutrition  []18.524.9 Normal  [x]2529.9 Overweight (not obese)  []3034.9 Obese class 1 (Low Risk)  []3539.9 Obese class 2 (Moderate Risk)  []?40 Obese class 3 (High Risk)    RECENT LABS:   Lab Results   Component Value Date    WBC 19.9 (H) 2021    HGB 10.7 (L) 2021    HCT 36.4 2021     (H) 2021    ALT 6 2021    AST 24 2021     (L) 2021    K 3.1 (L) 2021    CL 96 (L) 2021    CREATININE 0.21 (L) 2021    BUN 2 (L) 2021    CO2 22 2021    TSH 0.93 10/22/2020    INR 1.0 10/23/2020     24 HOUR INTAKE/OUTPUT: Intake/Output Summary (Last 24 hours) at 1/5/2021 0648  Last data filed at 1/5/2021 0600  Gross per 24 hour   Intake 984.2 ml   Output 3815 ml   Net -2830.8 ml       IMAGING:   XR CHEST PORTABLE   Final Result   Stable chest      Stable support lines      Possible mild edema with moderate left perihilar, left basilar opacity   related to combined pleuroparenchymal process         XR ABDOMEN FOR NG/OG/NE TUBE PLACEMENT   Final Result   Enteric tube in the stomach. Other incidental findings as above. XR CHEST PORTABLE   Final Result   ET tube as above. Life support apparatus otherwise stable. Mild-to-moderate   edema and small left effusion unchanged. MRI BRAIN W WO CONTRAST    (Results Pending)   XR CHEST PORTABLE    (Results Pending)         Labs and Images reviewed with:  [x] Dr. Macie Albrecht. Sharon    [] Dr. Tex Davis  [] Dr. Domonique Devine  [] There are no new interval images to review. PHYSICAL EXAM       CONSTITUTIONAL:  Chronically ill appearing, GCS 7T. HEAD:  normocephalic, atraumatic    EYES:  4 mm b/l briskly reactive to light   ENT:  moist mucous membranes, intubated, NG in place   NECK:  supple, symmetric   LUNGS:  Equal air entry bilaterally   CARDIOVASCULAR:  normal s1 / s2, RRR, distal pulses intact   Chest Right port accessed with no erythema, induration, or warmth concerning for infection; left lower NOEL drain with light green/purulent discharge   ABDOMEN:  Soft, no rigidity   NEUROLOGIC:  Mental Status: Spontaneously moving all extremities, unresponsive to pain             Cranial Nerves:    III: Pupils:  equal, round, reactive to light  V: Corneal reflex:  completed.   intact  Positive gag, positive cough    Motor Exam:    Spontaneous moving all extremities    Sensory: Unresponsive to pain       DRAINS:  Left sided NOEL drain w/ 90 mL output- uncertain which cavity it is in    ASSESSMENT AND PLAN: This is a 60 y.o. female with history of DM, seizures, and metastatic ovarian cancer; presenting as a transfer from [facility] ED for status epilepticus, intubated and sedated with propofol.      Patient care will be discussed with attending, will reevaluate patient along with attending.        NEUROLOGIC:  - Imaging    - CT H () -no intracranial hemorrhage/mass-effect/midline shift, but does have allergies mineralization decannulation, clivus, and upper cervical spine suspicious for osseous metastatic disease. - Await MRI Brain  - AEDs    - hx of seizure and unclear break through seizure    - Loaded with 2.250 mg Keppra x1 and cont.  Keppra 1500 mg BID    - Vimpat not given at home due to insurance  - LTME   - diffuse theta waves and delta waves concerning for moderate encephalopathy with no epileptic waveforms  - seizure precautions  - Sedation   - Propofol    -to wean this afternoon for reexamination  - Goal SBP <160 mmHg  - Neuro checks per protocol    CARDIOVASCULAR:  - Goal SBP <160 mmHg  - Elevated troponin   44->139->159->161->159->169   - Cardiology consulted and appreciate recs   - Started low-dose intensity heparin drip with no bolus    -Due to concern from intracranial hemorrhage due to metastatic cancer  - Continue telemetry    PULMONARY:  - Vent Settings: PRVC 12/420/5/0 0.3  - Daily AB.447/42.2/94.1 ; PF ratio 314  - Pleural catheter fluid -exudative   - Cell count with 400 K WBC and 80 K RBC   - LDH of 13 K  - CXR - mild/moderate edema on the right side with unchanged small left base pleural effusion    RENAL/FLUID/ELECTROLYTE:  - BUN 14/ Creatinine 0.96  - Urine output 2.2 ml/kg/hr  - IVF: 75 mL/HR  - Replace electrolytes PRN  - Daily BMP    GI/NUTRITION:  NUTRITION:  Diet NPO Effective Now  - Bowel regimen: glycolax  - GI prophylaxis: protonix IV  - Hx of GI bleed 10/2020   -Intra-abdominal mass this pocket requiring embolizations -We will consult GI of concern for possible GI bleed due to starting low intensity heparin drip given history    ID:  - Tmax 37.4  - WBC 31.4->19.9  - Procalcitonin of 0.89  - ordered CT A/P with contrast  - Await pleural fluid/blood/urine CTX  - Infectious work up   - Drain fluid concerning for infection with white count of 400K and LDH of 13 K    - hx of splenic absecss   - Continue Vanco and Zosyn    -- add flagyl    - Consult infectious disease  - Continue to monitor for fevers  - Daily CBC    HEME:   - H&H 10.7/36.4  - Platelets 099  - Daily CBC    ENDOCRINE:  - Continue to monitor blood glucose, goal <180      OTHER:  - PT/OT/ST   - Code Status: Full code    PROPHYLAXIS:  Stress ulcer: H2 blocker    DVT PROPHYLAXIS:  - SCD sleeves - Thigh High   -Heparin       DISPOSITION:  [x] To remain ICU: We will continue to follow along. For any changes in exam or patient status please contact Neuro Critical Care.       Guido Carrel, DO  Neuro Critical Care  Pager [phone]  1/5/2021     6:48 AM

## 2021-01-05 NOTE — PROCEDURES
LONG-TERM EEG-VIDEO 5656 [address]    Patient: Kailee Lal [facility]  Age: 60 y.o. MRN: [id number]    Referring Physician: No ref. provider found  History: The patient is a 60 y.o. female who presented breakthrough seizure/encephalopathy. This long-term video-EEG monitoring study was performed to determine the nature of the patient's clinical events. The patient is on neuroactive medications. Kailee Lal [place]   Current Facility-Administered Medications   Medication Dose Route Frequency Provider Last Rate Last Admin    chlorhexidine (PERIDEX) 0.12 % solution 15 mL  15 mL Mouth/Throat BID Owen Lynn MD        famotidine (PEPCID) injection 20 mg  20 mg Intravenous BID Owen Encinas MD        propofol injection  10 mcg/kg/min Intravenous Titrated Owen Encinas MD 27 mL/hr at 01/04/21 1623 60 mcg/kg/min at 01/04/21 1623     Current Outpatient Medications   Medication Sig Dispense Refill    metFORMIN (GLUCOPHAGE-XR) 500 MG extended release tablet Take 2 tablets by mouth twice daily 30 tablet 0    oxyCODONE (OXYCONTIN) 10 MG extended release tablet Take 1 tablet by mouth every 12 hours for 7 days. 14 tablet 0    oxyCODONE-acetaminophen (PERCOCET)  MG per tablet Take 1 tablet by mouth every 4 hours for 7 days. 42 tablet 0    amLODIPine (NORVASC) 10 MG tablet Take 1 tablet by mouth daily 30 tablet 3    levETIRAcetam (KEPPRA) 750 MG tablet Take 2 tablets by mouth 2 times daily 120 tablet 2    omeprazole (PRILOSEC) 20 MG delayed release capsule Take 20 mg by mouth daily      HYDROcodone-acetaminophen (NORCO) 5-325 MG per tablet Take 1 tablet by mouth every 6 hours as needed for Pain.       prochlorperazine (COMPAZINE) 10 MG tablet Take 10 mg by mouth every 6 hours as needed      tiZANidine (ZANAFLEX) 4 MG tablet Take 4 mg by mouth every 6 hours as needed  ibuprofen (ADVIL;MOTRIN) 200 MG tablet Take 200 mg by mouth every 6 hours as needed for Pain      pantoprazole (PROTONIX) 40 MG tablet Take 40 mg by mouth 2 times daily      ferrous sulfate (IRON 325) 325 (65 Fe) MG tablet Take 1 tablet by mouth daily (with breakfast) (Patient not taking: Reported on 10/16/2020) 60 tablet 5    aspirin 81 MG chewable tablet Take 81 mg by mouth nightly      simvastatin (ZOCOR) 40 MG tablet Take 40 mg by mouth nightly      sertraline (ZOLOFT) 100 MG tablet Take 50 mg by mouth daily        Technical Description: This is a 21-channel digital EEG recording with time-locked video. Electrodes were placed in accordance with the 10-20 International System of Electrode Placement. Single lead EKG monitoring was included. Baseline EEG Recording:  A formal baseline EEG recording was not obtained. Day 1 - 1/4/21, starting at 18:32    Interictal EEG Samples: The background activity consisted of 6 to 7 Hz of polymorphic theta activity of 25 to 30 µV. There was poor anterior posterior amplitude gradient. There was no no asymmetry between the 2 hemispheres. No abnormal epileptiform activity was seen. During behavioral sleep, sleep spindles were seen symmetric over both hemispheres. The EKG channel revealed no abnormalities. Ictal EEG Recording / Patient Events: During this period the patient had no events or seizures. Summary: During this day of recording no events were recorded. The interictal EEG was abnormal due to diffuse polymorphic theta slowing suggesting moderate encephalopathy. No abnormal epileptiform activity was seen. Monitoring was continued in order to record the patient's typical events. The EKG channel revealed no abnormalities.     Noreen Spurling, MD  Diplomate, American Board of Psychiatry and Neurology  Diplomate, American Board of Clinical Neurophysiology  Diplomate, American Board of Epilepsy Please note this is a preliminary report and updated daily. The final report will have a summary of behavior and electrographic findings with clinical correlation.

## 2021-01-05 NOTE — ED PROVIDER NOTES
[address] [facility]  Emergency Department  Emergency Medicine Resident Sign-out     Care of Nikki Bryant was assumed from Dr. Gino Bahena and is being seen for Seizures ([place] transfer)  . The patient's initial evaluation and plan have been discussed with the prior provider who initially evaluated the patient.      EMERGENCY DEPARTMENT COURSE / MEDICAL DECISION MAKING:       MEDICATIONS GIVEN:  Orders Placed This Encounter   Medications    levETIRAcetam (KEPPRA) 2,250 mg in sodium chloride 0.9 % 100 mL IVPB    levETIRAcetam in NaCl (KEPPRA) 1000 MG/100ML solution     TORY GONZALEZ: cabinet override    DISCONTD: chlorhexidine (PERIDEX) 0.12 % solution 15 mL    DISCONTD: famotidine (PEPCID) injection 20 mg    propofol injection    sodium chloride flush 0.9 % injection 10 mL    sodium chloride flush 0.9 % injection 10 mL    OR Linked Order Group     promethazine (PHENERGAN) tablet 12.5 mg     ondansetron (ZOFRAN) injection 4 mg    polyethylene glycol (GLYCOLAX) packet 17 g    OR Linked Order Group     acetaminophen (TYLENOL) tablet 650 mg     acetaminophen (TYLENOL) suppository 650 mg    0.9 % sodium chloride infusion    ferrous sulfate (FE TABS 325) EC tablet 325 mg    HYDROcodone-acetaminophen (NORCO) 5-325 MG per tablet 1 tablet    sertraline (ZOLOFT) tablet 50 mg    atorvastatin (LIPITOR) tablet 20 mg    levetiracetam (KEPPRA) 1500 mg/100 mL IVPB    vancomycin (VANCOCIN) 1250 mg in dextrose 5 % 250 mL IVPB     Order Specific Question:   Antimicrobial Indications     Answer:   Sepsis of Unknown Etiology    piperacillin-tazobactam (ZOSYN) 3.375 g in dextrose 5 % 50 mL IVPB extended infusion (mini-bag)     Order Specific Question:   Antimicrobial Indications     Answer:   Sepsis of Unknown Etiology    propofol 1000 MG/100ML injection     Pilar Fairbanks: cabinet override    AND Linked Order Group     pantoprazole (PROTONIX) injection 40 mg No acute intracranial hemorrhage, mass-effect, midline shift, or abnormal extra-axial collection. In the setting of known malignant neoplasm with seizure, contrast-enhanced brain MRI is recommended to further evaluate for intracranial metastatic disease. Minimal regions of low attenuation within the periventricular, subcortical, and deep white matter, presumably sequelae of chronic microangiopathic ischemic white matter change, though ultimately age indeterminate without prior comparison imaging. Recommend attention on follow-up. Heterogeneous mineralization is suggested within the calvarium, clivus, and partially imaged upper cervical spine, suspicious for osseous metastatic disease in the setting of a known primary neoplasm. Attention on follow-up MRI. Air-fluid levels with aerated secretions in the sphenoid sinuses and ethmoid air cells, which may relate to intubated status versus underlying pre-existing acute sinusitis. Xr Chest Portable    Result Date: 1/4/2021  EXAMINATION: ONE XRAY VIEW OF THE CHEST 1/4/2021 5:02 pm COMPARISON: January 4, 2021 HISTORY: ORDERING SYSTEM PROVIDED HISTORY: evaluate ETT position TECHNOLOGIST PROVIDED HISTORY: evaluate ETT position Reason for Exam: ett position FINDINGS: ET and enteric tubes unchanged. ET tube appears to terminate 42 mm above the alejandro. Right IJ MediPort unchanged. Small left effusion. Mild edema. Pigtail catheter left lung base. Metallic coils left upper quadrant of the abdomen. Heart and mediastinum normal.  Bony thorax intact. Subsegmental atelectasis right base. ET tube as above. Life support apparatus otherwise stable. Mild-to-moderate edema and small left effusion unchanged.      Xr Chest Portable    Result Date: 1/4/2021 EXAMINATION: ONE XRAY VIEW OF THE CHEST 1/4/2021 11:38 am COMPARISON: 10/24/2020 HISTORY: ORDERING SYSTEM PROVIDED HISTORY: post intubation TECHNOLOGIST PROVIDED HISTORY: post intubation Reason for Exam: LOC. NG tube & ET tube placed Acuity: Acute Type of Exam: Initial FINDINGS: Endotracheal tube is in place with its tip terminating approximately 4.4 cm above the alejandro. Enteric tube courses below the left hemidiaphragm. A left basilar pigtail chest tube catheter noted in place. There is a residual small to moderate-sized left-sided effusion with adjacent airspace disease. Right hemithorax is clear. Vascular port terminates overlying the expected location of the SVC. No convincing evidence for pneumothorax. Vascular coiling identified within the upper abdomen. Small to moderate left pleural effusion with adjacent airspace disease. Left-sided chest tube terminates overlying the left lung base. Endotracheal and enteric tubes as above. RECENT VITALS:     Temp: 97.8 °F (36.6 °C),  Pulse: 91, Resp: 19, BP: 113/73, SpO2: 99 %    This patient is a 60 y.o. Female with seizures, altered mental status. Patient transferred from [facility], intubated prior to arrival to our emergency department. Patient with history of ovarian cancer with metastases. Patient found to have PRES. plan for admission to neuro ICU. Patient with elevated troponin 139. Awaiting repeat troponin and will start heparin drip if uptrending. Repeat troponin uptrending to 159. Spoke with cardiology who recommends starting heparin infusion if no contraindication. At this time patient was being transferred upstairs to neuro ICU. I spoke with neuro critical care resident who stated that he would discuss heparin infusion with his attending physician. OUTSTANDING TASKS / RECOMMENDATIONS:    1. Troponin, start heparin if necessary, awaiting bed     FINAL IMPRESSION:     1.  Seizure ([address]        DISPOSITION: DISPOSITION:  []  Discharge   []  Transfer -    [x]  Admission -  Neuro ICU   []  Against Medical Advice   []  Eloped   FOLLOW-UP: No follow-up provider specified.    DISCHARGE MEDICATIONS: Current Discharge Medication List             Birgit Raines DO  Emergency Medicine Resident  [facility]       Birgit Raines, [address] [place] [address]  Resident  01/05/21 [id number]

## 2021-01-05 NOTE — PROCEDURES
LONG-TERM EEG-VIDEO [id number] [address]    Patient: Carmen Garcia [facility]  Age: 60 y.o. MRN: [id number]    Referring Physician: No ref. provider found  History: The patient is a 60 y.o. female who presented breakthrough seizure/encephalopathy. This long-term video-EEG monitoring study was performed to determine the nature of the patient's clinical events. The patient is on neuroactive medications.    Carmen Garcia Serabertha   Current Facility-Administered Medications   Medication Dose Route Frequency Provider Last Rate Last Admin    propofol injection  10 mcg/kg/min Intravenous Titrated Owen Lynn MD 18 mL/hr at 01/05/21 0600 40 mcg/kg/min at 01/05/21 0600    sodium chloride flush 0.9 % injection 10 mL  10 mL Intravenous 2 times per day Lawjim Guerrero, APRN - CNP   10 mL at 01/04/21 2203    sodium chloride flush 0.9 % injection 10 mL  10 mL Intravenous PRN Ai Guerrero, APRN - CNP        promethazine (PHENERGAN) tablet 12.5 mg  12.5 mg Oral Q6H PRN Ai Guerrero, APRN - CNP        Or    ondansetron (ZOFRAN) injection 4 mg  4 mg Intravenous Q6H PRN Lawtamarane Shine, APRN - CNP        polyethylene glycol (GLYCOLAX) packet 17 g  17 g Oral Daily PRN Lawayne Shine, APRN - CNP        acetaminophen (TYLENOL) tablet 650 mg  650 mg Oral Q6H PRN Lucane Carloe, APRN - CNP        Or    acetaminophen (TYLENOL) suppository 650 mg  650 mg Rectal Q6H PRN Lawtamarane Shine, APRN - CNP        0.9 % sodium chloride infusion   Intravenous Continuous Lawayne Cesar, APRN - CNP 75 mL/hr at 01/04/21 2058 New Bag at 01/04/21 2058    ferrous sulfate (FE TABS 325) EC tablet 325 mg  325 mg Oral Daily with breakfast Ai Guerrero, APRN - CNP        HYDROcodone-acetaminophen (NORCO) 5-325 MG per tablet 1 tablet  1 tablet Oral Q6H PRN Ai Guerrero, APRN - CNP  sertraline (ZOLOFT) tablet 50 mg  50 mg Oral Daily Frederick Short, APRN - CNP   50 mg at 01/04/21 2313    atorvastatin (LIPITOR) tablet 20 mg  20 mg Oral Daily Frederick Short, APRN - CNP   20 mg at 01/04/21 2312    levetiracetam (KEPPRA) 1500 mg/100 mL IVPB  1,500 mg Intravenous Q12H Frederick Short, APRN - CNP        vancomycin (VANCOCIN) 1250 mg in dextrose 5 % 250 mL IVPB  1,250 mg Intravenous Q12H Frederick Short, APRN - CNP   Stopped at 01/05/21 0120    piperacillin-tazobactam (ZOSYN) 3.375 g in dextrose 5 % 50 mL IVPB extended infusion (mini-bag)  3.375 g Intravenous Q8H Frederick Short, APRN - CNP 12.5 mL/hr at 01/05/21 0520 3.375 g at 01/05/21 0520    pantoprazole (PROTONIX) injection 40 mg  40 mg Intravenous BID Jaymie Kim DO   40 mg at 01/04/21 2334    vancomycin (VANCOCIN) intermittent dosing (placeholder)   Other RX Placeholder Frederick Short, APRN - CNP         Technical Description: This is a 21-channel digital EEG recording with time-locked video. Electrodes were placed in accordance with the 10-20 International System of Electrode Placement. Single lead EKG monitoring was included. Baseline EEG Recording:  A formal baseline EEG recording was not obtained. Day 1 - 1/4/21, starting at 18:32    Interictal EEG Samples: The background activity consisted of 6 to 7 Hz of polymorphic theta activity of 25 to 30 µV. There was poor anterior posterior amplitude gradient. There was no asymmetry between the 2 hemispheres. No abnormal epileptiform activity was seen. During behavioral sleep, sleep spindles were seen symmetric over both hemispheres. The EKG channel revealed no abnormalities. Ictal EEG Recording / Patient Events: During this period the patient had no events or seizures.

## 2021-01-05 NOTE — ED NOTES
Dr. Logan Drilling at bedside to update family on results and plan of care.       Cliff Villarreal, RN  01/04/21 [id number]

## 2021-01-05 NOTE — PROGRESS NOTES
The transport originated from ED. Pt. was transported to NSICU. Assisting with the transport was Kamilah Porter RN. Appropriate devices were applied to monitor the patient's condition during transport. Patient transported  via 100% O2 via ventilator. Patient tolerated well. Report given to [facility], RRT, all questions answered. Pt received in NSICU by Jovany Crawley, RRT, all questions answered.        Payton Vallejo  8:53 PM

## 2021-01-05 NOTE — PROGRESS NOTES
Pharmacy Note  Vancomycin Consult    Hugo Hernández is a 60 y.o. female started on Vancomycin for sepsis; consult received from Marnie Garcia to manage therapy. Also receiving the following antibiotics: Zosyn. Patient Active Problem List   Diagnosis    Malignant neoplasm of ovary ([facility]    [facility]    Type 2 diabetes mellitus without complication, without long-term current use of insulin (HCC)    Anemia    Splenic lesion    Ovarian cancer ([facility]    Acute encephalopathy    PRES (posterior reversible encephalopathy syndrome)    Hemianopia, bitemporal    Encephalopathy    Status epilepticus ([facility]       Allergies:  Patient has no known allergies. Temp max: afebrile    Recent Labs     01/04/21  1225   BUN 5*       Recent Labs     01/04/21  1225   CREATININE 0.50       Recent Labs     01/04/21  1225 01/04/21  1635   WBC 32.2* 31.4*         Intake/Output Summary (Last 24 hours) at 1/4/2021 2128  Last data filed at 1/4/2021 1752  Gross per 24 hour   Intake    Output 1475 ml   Net -1475 ml       Culture Date      Source                       Results   Pending    Ht Readings from Last 1 Encounters:   01/04/21 5' 6\" (1.676 m)        Wt Readings from Last 1 Encounters:   01/04/21 165 lb 5.5 oz (75 kg)         Body mass index is 26.69 kg/m². Estimated Creatinine Clearance: 129 mL/min (based on SCr of 0.5 mg/dL). Goal Trough Level: 15-20 mcg/mL    Assessment/Plan:  Will initiate Vancomycin with 1250 mg IV every 12 hours. Timing of trough level will be determined based on culture results, renal function, and clinical response. Thank you for the consult. Will continue to follow. ERICA Hinton, PharmD  1/4/2021 9:29 PM

## 2021-01-05 NOTE — CONSULTS
[facility]   Consult Note         Today's Date: 1/5/2021  Patient Name: Deepak Whittington  Date of admission: 1/4/2021  4:09 PM  Patient's age: 60 y.o., 1965  Admission Dx: Status epilepticus ([facility] [G40.901]    Reason for Consult:  Cardiac evaluation    Requesting Physician: Shefali Cruz MD    REASON FOR CONSULT: Elevated troponins, low voltage EKG    History Obtained From:  Patient, chart, staff, records    HISTORY OF PRESENT ILLNESS:      The patient is a 60 y.o. female with PMH significant for diabetes mellitus, ovarian, cervical cancer with mets to the lungs and intra-abdominal stasis. She has been transferred from [facility] ED to [facility] for status epilepticus. She is currently intubated and sedated. Cardiology was consulted for elevated troponins and low voltage EKG. troponins increased from 44->159->161->159->169. KG showed sinus tachycardia with inverted P waves in V1. Past Medical History:   has a past medical history of Anemia, Bleeding, Cervical cancer ([address] Depression, Diabetes mellitus ([address] GERD (gastroesophageal reflux disease), Metastatic cancer ([address] Ovarian cancer ([address] Post chemo evaluation, and Splenic lesion. Past Surgical History:   has a past surgical history that includes Hysterectomy, total abdominal; Port Surgery; Tonsillectomy; IR PORT PLACEMENT > 5 YEARS (8/24/2020); Anus surgery; Abscess Drainage (2013); colectomy (03/2013); and IR EMBOLIZATION HEMORRHAGE (10/05/2020). Home Medications:    Prior to Admission medications    Medication Sig Start Date End Date Taking? Authorizing Provider   metFORMIN (GLUCOPHAGE-XR) 500 MG extended release tablet Take 2 tablets by mouth twice daily 1/4/21   Elaine Lopez MD   oxyCODONE (OXYCONTIN) 10 MG extended release tablet Take 1 tablet by mouth every 12 hours for 7 days.  12/30/20 1/6/21  Andrew Johnson MD oxyCODONE-acetaminophen (PERCOCET)  MG per tablet Take 1 tablet by mouth every 4 hours for 7 days. 12/30/20 1/6/21  Janak Newberry MD   amLODIPine (NORVASC) 10 MG tablet Take 1 tablet by mouth daily 10/29/20   Jack Temple MD   levETIRAcetam (KEPPRA) 750 MG tablet Take 2 tablets by mouth 2 times daily 10/28/20   Ramya Cardenas MD   omeprazole (PRILOSEC) 20 MG delayed release capsule Take 20 mg by mouth daily    Historical Provider, MD   HYDROcodone-acetaminophen (NORCO) 5-325 MG per tablet Take 1 tablet by mouth every 6 hours as needed for Pain.     Historical Provider, MD   prochlorperazine (COMPAZINE) 10 MG tablet Take 10 mg by mouth every 6 hours as needed    Historical Provider, MD   tiZANidine (ZANAFLEX) 4 MG tablet Take 4 mg by mouth every 6 hours as needed    Historical Provider, MD   ibuprofen (ADVIL;MOTRIN) 200 MG tablet Take 200 mg by mouth every 6 hours as needed for Pain    Historical Provider, MD   pantoprazole (PROTONIX) 40 MG tablet Take 40 mg by mouth 2 times daily    Historical Provider, MD   ferrous sulfate (IRON 325) 325 (65 Fe) MG tablet Take 1 tablet by mouth daily (with breakfast)  Patient not taking: Reported on 10/16/2020 9/18/20   Meghna Guillen MD   aspirin 81 MG chewable tablet Take 81 mg by mouth nightly    Historical Provider, MD   simvastatin (ZOCOR) 40 MG tablet Take 40 mg by mouth nightly    Historical Provider, MD   sertraline (ZOLOFT) 100 MG tablet Take 50 mg by mouth daily  8/8/20   Historical Provider, MD     sodium chloride flush, 10 mL, Intravenous, 2 times per day    ferrous sulfate, 325 mg, Oral, Daily with breakfast    sertraline, 50 mg, Oral, Daily    atorvastatin, 20 mg, Oral, Daily    levetiracetam, 1,500 mg, Intravenous, Q12H    vancomycin, 1,250 mg, Intravenous, Q12H    piperacillin-tazobactam, 3.375 g, Intravenous, Q8H    pantoprazole, 40 mg, Intravenous, BID **AND** [COMPLETED] sodium chloride (PF), 10 mL, Intravenous, Once   vancomycin (VANCOCIN) intermittent dosing (placeholder), , Other, RX Placeholder      Allergies:  Patient has no known allergies. Social History:   reports that she has been smoking. She has been smoking about 1.00 pack per day. She uses smokeless tobacco. She reports previous alcohol use. She reports that she does not use drugs. Family History: family history includes Alcohol Abuse in her mother; Cirrhosis in her mother. No h/o sudden cardiac death. REVIEW OF SYSTEMS:    · Constitutional: there has been no unanticipated weight loss. There's been No change in energy level, No change in activity level. · Eyes: No visual changes or diplopia. No scleral icterus. · ENT: No Headaches, hearing loss or vertigo. No mouth sores or sore throat. · Cardiovascular: per HPI  · Respiratory: per HPI  · Gastrointestinal: No abdominal pain, appetite loss, blood in stools. No change in bowel or bladder habits. · Genitourinary: No dysuria, trouble voiding, or hematuria. · Musculoskeletal:  No gait disturbance, No weakness or joint complaints. · Integumentary: No rash or pruritis. · Neurological: No headache, diplopia, change in muscle strength, numbness or tingling. No change in gait, balance, coordination, mood, affect, memory, mentation, behavior. · Psychiatric: No anxiety, or depression. · Endocrine: No temperature intolerance. No excessive thirst, fluid intake, or urination. No tremor. · Hematologic/Lymphatic: No abnormal bruising or bleeding, blood clots or swollen lymph nodes. · Allergic/Immunologic: No nasal congestion or hives. PHYSICAL EXAM:      BP (!) 144/83   Pulse 112   Temp 97.4 °F (36.3 °C) (Axillary)   Resp 22   Ht 5' 6\" (1.676 m)   Wt 165 lb (74.8 kg)   SpO2 99%   BMI 26.63 kg/m²    Constitutional and General Appearance: alert, cooperative, no distress and appears stated age  HEENT: PERRL, no cervical lymphadenopathy. No masses palpable.  Normal oral mucosa  Respiratory: · Normal excursion and expansion without use of accessory muscles  · Resp Auscultation: Good respiratory effort. No for increased work of breathing. On auscultation: clear to auscultation bilaterally  Cardiovascular:  · The apical impulse is not displaced  · Heart tones are crisp and normal. regular S1 and S2.  · Jugular venous pulsation Normal  · The carotid upstroke is normal in amplitude and contour without delay or bruit  · Peripheral pulses are symmetrical and full   Abdomen:   · No masses or tenderness  · Bowel sounds present  Extremities:  ·  No Cyanosis or Clubbing  ·  Lower extremity edema: No  ·  Skin: Warm and dry  Neurological:  · Alert and oriented. · Moves all extremities well  · No abnormalities of mood, affect, memory, mentation, or behavior are noted        EKG:    Date: 01/05/21  Reading: This tachycardia, inverted P waves in V1    LAST ECHO:  Date: 8/20  Findings Summary: Normal LV size, normal systolic function. LVEF 60 to 65%. No regional wall motion abnormalities. Mild concentric LVH. No valvular regurgitation or stenosis. LAST Stress Test:   None    LAST Cardiac Angiography:.  none      Labs:     CBC:   Recent Labs     01/04/21  1635 01/05/21  0526   WBC 31.4* 19.9*   HGB 10.7* 10.7*   HCT 37.1 36.4   * 952*     BMP:   Recent Labs     01/04/21  1225 01/05/21  0342    132*   K 3.5* 3.1*   CO2 20 22   BUN 5* 2*   CREATININE 0.50 0.21*   LABGLOM >60 >60   GLUCOSE 361* 118*     BNP: No results for input(s): BNP in the last 72 hours. PT/INR: No results for input(s): PROTIME, INR in the last 72 hours. APTT:No results for input(s): APTT in the last 72 hours. CARDIAC ENZYMES:No results for input(s): CKTOTAL, CKMB, CKMBINDEX, TROPONINI in the last 72 hours.   FASTING LIPID PANEL:No results found for: HDL, LDLDIRECT, LDLCALC, TRIG  LIVER PROFILE:  Recent Labs     01/04/21  1225 01/05/21  0342   AST 24 24   ALT <5* 6   LABALBU 3.1* 3.0*     Troponins: Invalid input(s): TROPONIN

## 2021-01-05 NOTE — PLAN OF CARE
Problem: OXYGENATION/RESPIRATORY FUNCTION  Goal: Patient will maintain patent airway  Outcome: Ongoing  Goal: Patient will achieve/maintain normal respiratory rate/effort  Description: Respiratory rate and effort will be within normal limits for the patient  Outcome: Ongoing     Problem: MECHANICAL VENTILATION  Goal: Patient will maintain patent airway  Outcome: Ongoing  Goal: Oral health is maintained or improved  Outcome: Ongoing  Goal: ET tube will be managed safely  Outcome: Ongoing  Goal: Ability to express needs and understand communication  Outcome: Ongoing  Goal: Mobility/activity is maintained at optimum level for patient  Outcome: Ongoing     Problem: SKIN INTEGRITY  Goal: Skin integrity is maintained or improved  Outcome: Ongoing     Problem: Restraint Use - Nonviolent/Non-Self-Destructive Behavior:  Goal: Absence of restraint indications  Description: Absence of restraint indications  Outcome: Ongoing  Goal: Absence of restraint-related injury  Description: Absence of restraint-related injury  Outcome: Ongoing     Problem: Coping:  Goal: Ability to cope will improve  Description: Ability to cope will improve  Outcome: Ongoing  Goal: Ability to identify appropriate support needs will improve  Description: Ability to identify appropriate support needs will improve  Outcome: Ongoing     Problem: Health Behavior:  Goal: Ability to manage health-related needs will improve  Description: Ability to manage health-related needs will improve  Outcome: Ongoing     Problem: Physical Regulation:  Goal: Signs of adequate cerebral perfusion will increase  Description: Signs of adequate cerebral perfusion will increase  Outcome: Ongoing  Goal: Ability to maintain a stable neurologic state will improve  Description: Ability to maintain a stable neurologic state will improve  Outcome: Ongoing     Problem: Safety:  Goal: Ability to remain free from injury will improve Description: Ability to remain free from injury will improve  Outcome: Ongoing     Problem: Self-Concept:  Goal: Level of anxiety will decrease  Description: Level of anxiety will decrease  Outcome: Ongoing  Goal: Ability to verbalize feelings about condition will improve  Description: Ability to verbalize feelings about condition will improve  Outcome: Ongoing

## 2021-01-05 NOTE — CONSULTS
[facility] -   Infectious diseases evaluation  admission date 1/4/2021    reason for consultation:   leukocytosis    Impression :   Current:  · splenic abscess - GNR - drained 12/2020  · Post spleen embolization for mass, 10/2020 STL  · ovarian metastatic cancer  · 1/5 - SZ and status epilepticus  · intubated  · bandemia  · Rash on ceftriaxone, [facility]  Discussion / summary of stay / plan of care   ·   Recommendations   · Keep meropenem till further data from ST on the cx of 12/22 and our final GNR ID  · Await repeat CT AP for better eval of the LUQ  · Disc w neuro ICU    Infection Control Recommendations   · [place] Precautions  · Contact Isolation     Antimicrobial Stewardship Recommendations   · Simplification of therapy  · Targeted therapy    Coordination ofOutpatient Care:   · Estimated Length of IV antimicrobials:  · Patient will need Midline / picc Catheter Insertion:   · Patient will need SNF:  · Patient will need outpatient wound care:     History of Present Illness:   Initial history:  Macy Hall is a 60y.o.-year-old female w ovarian CA came w status epilepticus, found down w SZ, intubated. Hx ovarian CA w mets to abd and lungs,  Recent GI bleed STL 10/2020, post embolization of the spleen for splenic mass. 12/2020 admitted w left splenic abscess and drained and sent recently on invanz [aspirate showed E. coli sensitive to ceftriaxone, Bacteroides, strep viridans. When the patient was given ceftriaxone he had a rash and allergy reaction, hence it was stopped and he was sent on Invanz]  - left pleural fluid aspirated was then neg-    At admission to [facility],  found w tarry fluid from the NGT and clots in ETT. possibly from nasal attempted intubation - cleared alter  On-  Still full code. Left spleen drainage purulent. And now cx w GNR  Leukocytosis noticed, we are called for opinion.     1/5 left perisplenic fluid aspirated and grew GNR Interval changes  1/5/2021   Patient Vitals for the past 8 hrs:   BP Temp Temp src Pulse Resp SpO2 Height   01/05/21 1400 117/65   105 21 98 %    01/05/21 1300 122/74   99 18 98 %    01/05/21 1237 129/74 98.2 °F (36.8 °C) Axillary 104 20 98 %    01/05/21 1100 123/69   98 18 98 %    01/05/21 1022       5' 6\" (1.676 m)   01/05/21 1000 121/75   100 18 98 %    01/05/21 0900 (!) 144/83   112 22 99 %    01/05/21 0800 132/67 97.4 °F (36.3 °C) Axillary 101 21 100 %    01/05/21 0752    105 20 98 %    01/05/21 0715 133/71 97.6 °F (36.4 °C) Axillary 100 18 98 %        Summary of relevant labs:  Labs:  WBC 32  Micro:  BC 1/5 x 2  U cx 1/5   Perisplenic fluid  1/5/21  · cx 1/5 GNR,   · LDH 86496  · RBC 39942  · WBC 236146    Imaging:  MRI brain   Impression:  1. No acute intracranial abnormality. Specifically, no acute infarction, mesial temporal sclerosis, or intracranial metastatic disease. 2. Mild parenchymal volume loss. Sequela of mild chronic microvascular   ischemic changes. CXR 1/5/21  Left basilar opacity and left perihilar infiukltrate        I have personally reviewed the past medical history, past surgical history, medications, social history, and family history, and I haveupdated the database accordingly. Allergies:   Patient has no known allergies. Review of Systems:     Review of Systems   Unable to perform ROS: Intubated       Physical Examination :       Physical Exam  Constitutional:       General: She is not in acute distress. Appearance: Normal appearance. She is ill-appearing. She is not toxic-appearing. HENT:      Head: Normocephalic and atraumatic. Nose: Nose normal.      Mouth/Throat:      Mouth: Mucous membranes are moist.   Eyes:      Conjunctiva/sclera: Conjunctivae normal.      Pupils: Pupils are equal, round, and reactive to light. Neck:      Musculoskeletal: No muscular tenderness.    Cardiovascular: Rate and Rhythm: Normal rate and regular rhythm. Heart sounds: Normal heart sounds. No murmur. Pulmonary:      Effort: No respiratory distress. Breath sounds: Normal breath sounds. Abdominal:      General: There is no distension. Palpations: Abdomen is soft. Comments: Left UQ  drain w pus   Genitourinary:     Comments: Urina cristiano  Musculoskeletal:         General: No swelling. Right lower leg: No edema. Left lower leg: No edema. Skin:     Coloration: Skin is not jaundiced or pale. Neurological:      Comments: intubated   Psychiatric:      Comments: On the vent         Past Medical History:     Past Medical History:   Diagnosis Date    Anemia     Bleeding 10/2020    intra-abdominal bleeding -due to splenic mass with GI infiltration. Status post embolization    Cervical cancer ([address]     Depression     Diabetes mellitus ([address]     GERD (gastroesophageal reflux disease)     Metastatic cancer ([address] 10/2020    extensive intraabdominal and splenic involvement and lung mets.  Ovarian cancer ([address]     low grade serous ovarian carcinoma    Post chemo evaluation     2007: Chemo via med onc (Dr. Rebekah Hart), 2008: Corinda Prom due to rising CA-125, 2013: intraperitoneal chemo,12/2015: Ca125 - 25     Splenic lesion        Past Surgical  History:     Past Surgical History:   Procedure Laterality Date    ABSCESS DRAINAGE  2013    Franca rectal    ANUS SURGERY      ANAL FISSURECTOMY    COLECTOMY  03/2013    ex lap, tumor debulking, transverse colectomy w reanastamosis, subgastric omentectomy, intraperitoneal port placement    HYSTERECTOMY, TOTAL ABDOMINAL      IR EMBOLIZATION HEMORRHAGE  10/05/2020    intra-abdominal bleeding -due to splenic mass with GI infiltration. Status post embolization [employer] interlock coils x7. mri condtional 3t ok, safe immediately post implant.     IR PORT PLACEMENT EQUAL OR GREATER THAN 5 YEARS  8/24/2020 IR PORT PLACEMENT EQUAL OR GREATER THAN 5 YEARS 8/24/2020 Siri Bella MD [facility] SPECIAL PROCEDURES    PORT SURGERY      IP Port    TONSILLECTOMY         Medications:      potassium bicarb-citric acid  40 mEq Oral Daily    sodium chloride flush  10 mL Intravenous 2 times per day    ferrous sulfate  325 mg Oral Daily with breakfast    sertraline  50 mg Oral Daily    atorvastatin  20 mg Oral Daily    levetiracetam  1,500 mg Intravenous Q12H    vancomycin  1,250 mg Intravenous Q12H    piperacillin-tazobactam  3.375 g Intravenous Q8H    pantoprazole  40 mg Intravenous BID    vancomycin (VANCOCIN) intermittent dosing (placeholder)   Other RX Placeholder       Social History:     Social History     Socioeconomic History    Marital status: Single     Spouse name: Not on file    Number of children: Not on file    Years of education: Not on file    Highest education level: Not on file   Occupational History    Not on file   Social Needs    Financial resource strain: Not on file    Food insecurity     Worry: Not on file     Inability: Not on file    Transportation needs     Medical: Not on file     Non-medical: Not on file   Tobacco Use    Smoking status: Current Every Day Smoker     Packs/day: 1.00    Smokeless tobacco: Current User   Substance and Sexual Activity    Alcohol use: Not Currently    Drug use: Never    Sexual activity: Not on file   Lifestyle    Physical activity     Days per week: Not on file     Minutes per session: Not on file    Stress: Not on file   Relationships    Social connections     Talks on phone: Not on file     Gets together: Not on file     Attends Mandaen service: Not on file     Active member of club or organization: Not on file     Attends meetings of clubs or organizations: Not on file     Relationship status: Not on file    Intimate partner violence     Fear of current or ex partner: Not on file     Emotionally abused: Not on file Physically abused: Not on file     Forced sexual activity: Not on file   Other Topics Concern    Not on file   Social History Narrative    Not on file       Family History:     Family History   Problem Relation Age of Onset    Alcohol Abuse Mother     Cirrhosis Mother       Medical Decision Making:   I have independently reviewed/ordered the following labs:    CBC with Differential:   Recent Labs     01/04/21  1635 01/05/21  0526   WBC 31.4* 19.9*   HGB 10.7* 10.7*   HCT 37.1 36.4   * 952*   LYMPHOPCT 3* 15*   MONOPCT 0* 7     BMP:  Recent Labs     01/04/21  1225 01/05/21  0342    132*   K 3.5* 3.1*    96*   CO2 20 22   BUN 5* 2*   CREATININE 0.50 0.21*   MG  --  1.6     Hepatic Function Panel:   Recent Labs     01/04/21  1225 01/05/21  0342   PROT 7.0 6.5   LABALBU 3.1* 3.0*   BILIDIR <0.08 0.11   IBILI CANNOT BE CALCULATED 0.07   BILITOT 0.20* 0.18*   ALKPHOS 93 87   ALT <5* 6   AST 24 24     No results for input(s): RPR in the last 72 hours. No results for input(s): HIV in the last 72 hours. No results for input(s): BC in the last 72 hours. Lab Results   Component Value Date    CREATININE 0.21 01/05/2021    GLUCOSE 118 01/05/2021       Detailed results: Thank you for allowing us to participate in the care of this patient. Please call with questions. This note is created with the assistance of a speech recognition program.  While intending to generate adocument that actually reflects the content of the visit, the document can still have some errors including those of syntax and sound a like substitutions which may escape proof reading. It such instances, actual meaningcan be extrapolated by contextual diversion.     Bo Munguia MD  Office: [phone]  Perfect serve / office [phone]

## 2021-01-05 NOTE — ED NOTES
Pt maintained on propofol infusion for sedation at this time. Pt resting comfortably at this time. Pt has continuous EEG in place at this time. Pt awaiting NICU bed placement. Will continue with plan of care.       Corey Velarde RN  01/04/21 0304

## 2021-01-05 NOTE — PROGRESS NOTES
[facility]  Documentation Note                Admission Dx: Status epilepticus ([facility] Davon Oropeza    Past Medical History:   has a past medical history of Anemia, Bleeding, Cervical cancer ([facility] Depression, Diabetes mellitus ([facility] GERD (gastroesophageal reflux disease), Metastatic cancer ([facility] Ovarian cancer ([facility] Post chemo evaluation, and Splenic lesion. Previous Testing:     ECHO 8/31/2020: EF 60-65%, mild LVH, no regurg/stenosis.      Previous office/hospital visit:   None     Stephanie Mckeon [facility]

## 2021-01-05 NOTE — PLAN OF CARE
TODAY:  01/05/2021    AWAKE & FOLLOWING COMMANDS:  [x] No   [] Yes    INTUBATED:   [] No   [x] Yes    SEDATION/ANALGESIA:    [x] Propofol gtt  [] Versed gtt  [] Ativan gtt   [] No Sedation      FEEDING: Able to take PO?   [x] No:  [] NPO for:  [] NG/OG [] PEG  Tube Feeds:      [] Yes:  Diet:   DVT Prophylaxis:  [x] Yes:  Heparin infusion         [] No rationale:     Stress Ulcer Prophylaxis: [x] Yes: Pepcid 20mg BID  [] Not indicated    VASOPRESSORS:  [x] No    [] Yes  [] Levophed [] Dopamine [] Vasopressin  [] Dobutamine [] Phenylephrine [] Epinephrine    CENTRAL LINES:  [x] No    [] Yes:      Drain: Splenic Drain    BROWNLEE CATHETER: [] No    [x] Yes:      Head of Bed: [x] Elevated:          [] Flat

## 2021-01-05 NOTE — PLAN OF CARE
Nutrition Problem #1: Inadequate oral intake  Intervention: Food and/or Nutrient Delivery: Continue NPO  Nutritional Goals: Meet at least 75% of estimated nutrient needs

## 2021-01-05 NOTE — PROGRESS NOTES
Comprehensive Nutrition Assessment    Type and Reason for Visit:  Initial, Consult(Automatic vent consult)    Nutrition Recommendations/Plan: If nutrition support desired, suggest Semi-elemental (Vital AF 1.2), goal of 45 mL/hr with current Propofol rate. TF will provide 1296 kcals (1771 kcals with additional cals from Propofol), 81 gm protein. Nutrition Assessment:  Admitted with sz activity. Currently intubated and sedated with Propofol. Noted hx of ovarian CA with intraabdominal and lung mets. Per EHR, pt with significant weight loss over past 3 months. Malnutrition Assessment:  Malnutrition Status:  Insufficient data    Context:  Chronic Illness     Findings of the 6 clinical characteristics of malnutrition:  Energy Intake:  Unable to assess  Weight Loss:  7 - Greater than 7.5% over 3 months     Body Fat Loss:  Unable to assess     Muscle Mass Loss:  Unable to assess    Fluid Accumulation:  1 - Mild Extremities   Strength:  Not Performed    Estimated Daily Nutrient Needs:  Energy (kcal):  23-25 kcal/kg = 1820-1137 kcals/day; Weight Used for Energy Requirements:  Admission     Protein (g):  1.3-1.5 gm/kg = 80-90 gm/day; Weight Used for Protein Requirements:  Ideal          Nutrition Related Findings:  Labs: Na+ 132 (L), K+ 3.1 (L)      Wounds:  None(rednes on coccyx noted)       Current Nutrition Therapies:    Diet NPO Effective Now  Additional Calorie Sources:   Propofol at 18 ml/hr = 475 kcals/day    Anthropometric Measures:  · Height: 5' 6\" (167.6 cm)  · Current Body Weight: 165 lb 5.5 oz (75 kg)   · Admission Body Weight: 165 lb 5.5 oz (75 kg)    · Usual Body Weight: 187 lb 13.3 oz (85.2 kg)(10/5/20 per EHR (noted wt fluctuations between ~180-190 lbs))     · Ideal Body Weight: 130 lbs; % Ideal Body Weight 127.2 %   · BMI: 26.7  · BMI Categories: Overweight (BMI 25.0-29. 9)       Nutrition Diagnosis: · Inadequate oral intake related to impaired respiratory function as evidenced by NPO or clear liquid status due to medical condition      Nutrition Interventions:   Food and/or Nutrient Delivery:  Continue NPO  Nutrition Education/Counseling:  No recommendation at this time   Coordination of Nutrition Care:  Continue to monitor while inpatient    Goals:  Meet at least 75% of estimated nutrient needs       Nutrition Monitoring and Evaluation:   Behavioral-Environmental Outcomes:  None Identified   Food/Nutrient Intake Outcomes:  Diet Advancement/Tolerance  Physical Signs/Symptoms Outcomes:  Weight, Biochemical Data, Nutrition Focused Physical Findings, Skin     Discharge Planning:     Too soon to determine     Electronically signed by Lexi Ledesma, MS, RD, LD on 1/5/21 at 10:34 AM EST    Contact: [phone]

## 2021-01-05 NOTE — PROGRESS NOTES
Writer spoke with Valerie Mera from [facility], pt's home healthcare agency, regarding patient admission. Will need order for homecare to resume when pt is discharged.    Juanita Orosco RN

## 2021-01-05 NOTE — FLOWSHEET NOTE
SPIRITUAL CARE DEPARTMENT - [address]  PROGRESS NOTE    Shift date: 1.4.2021  Shift day: Monday   Shift # 2    Room # 6091/8926-43   Name: Santana Olson            Age: 60 y.o. Gender: female          Mormonism: [address] of Judaism: Unknown    Referral: Routine Visit    Admit Date & Time: 1/4/2021  4:09 PM    PATIENT/EVENT DESCRIPTION:  Santana Olson is a 60 y.o. female who has a history of cancer      SPIRITUAL ASSESSMENT/INTERVENTION:   encountered patient's son who was tearful and anxious. Patient states, Clayton Garcia mom is not doing well and has cancer. \"  Patient states that he is in remission from cancer and his mother supported him during that time but now she has it again. Son is fearful that the cancer may not go into remission and that his mother is not going to do well. Son asked for prayer.  prayed with son and gave space for him to process he thoughts, feelings, and concerns. Son states, Amanda Irvin is my bernardo that got me through my cancer. \"  Son receptive to spiritual care and available for needs as they arise. SPIRITUAL CARE FOLLOW-UP PLAN:  Chaplains will remain available to offer spiritual and emotional support as needed. Electronically signed by Gregorio Albrecht on 1/4/2021 at 11:37 PM.  [facility]  [phone]       01/04/21 2045   Encounter Summary   Services provided to: Family   Referral/Consult From: [address]   (1.4.2021)   Complexity of Encounter High   Length of Encounter 30 minutes   Spiritual Assessment Completed Yes   Crisis   Type Emotional distress   Assessment Tearful; Anxious; Fearful   Intervention Active listening;Explored feelings, thoughts, concerns;Explored coping resources; Discussed illness/injury and it's impact;Prayer;Sustaining presence/ Ministry of presence Outcome Expressed gratitude;Expressed feelings/needs/concerns;Engaged in conversation     Electronically signed by Paola Carmona on 1/4/2021 at 11:37 PM

## 2021-01-05 NOTE — CARE COORDINATION
Case Management Initial Discharge Plan  [facility],             Met with: daughter via telephone to discuss discharge plans. Information verified: address, contacts, phone number, , insurance Yes    Emergency Contact/Next of Kin name & number: Yovani Garcia (son-POA) [phone]    PCP: Todd Posey Date of last visit: has not seen. Follows for home care    Insurance Provider: [employer] and [address]    Discharge Planning    Living Arrangements:  Children   Support Systems:  Family Members    Home has 1 stories  5 stairs to climb to get into front door, stairs to climb to reach second floor  Location of bedroom/bathroom in home     Patient able to perform ADL's:Independent    Current Services (outpatient & in home) [facility]  DME equipment:   DME provider:     Receiving oral anticoagulation therapy? No    If indicated:   Physician managing anticoagulation treatment:   Where does patient obtain lab work for ATC treatment? Potential Assistance Needed:       Patient agreeable to home care: Yes  Freedom of choice provided:  yes    Prior SNF/Rehab Placement and Facility:   Agreeable to SNF/Rehab: No  [place] of choice provided: n/a     Evaluation: no    Expected Discharge date:       Patient expects to be discharged to:  home with son and [facility]  Follow Up Appointment: Best Day/ Time:      Transportation provider:   Transportation arrangements needed for discharge: No. Son or daughter will provide transportation    Readmission Risk              Risk of Unplanned Readmission:        17             Does patient have a readmission risk score greater than 14?: Yes  If yes, follow-up appointment must be made within 7 days of discharge.      Goals of Care: increased comfort      Discharge Plan: home with son and [facility]          Electronically signed by Bairon Peñaloza RN on 21 at 4:37 PM EST

## 2021-01-05 NOTE — ED NOTES
Attempted report, unable to give report at this time.  Will call back     Tasha Dan RN  01/04/21 9453

## 2021-01-05 NOTE — PROGRESS NOTES
 Splenic lesion       Allergies: No Known Allergies   Assessment    1. Metastatic ovarian cancer. 2. Possibly progressive disease. Lytic lesions per CT head but no lesions appreciated on MRI brain. 3. Recurrent seizures. 4. Acute respiratory failure secondary to status epilepticus likely from metastasis from underlying malignancy. Plan     1. Supportive care. 2. She continues to be on LTME per neurology and has moderate encephalopathy. Agree with neurology work up. 3. Likely stage IV disease with guarded prognosis. Further work-up/therapeutic management only when patient is more stable. 4. We will follow. Tex Keenan M.D. Internal Medicine Resident , PGY-3  [facility]  01/05/21 8:00 AM    Attending Physician Statement  The patient was seen and examined during rounds, I have discussed the care of Michelle Burton, including pertinent history and exam findings with the resident. I have reviewed the key elements of all parts of the encounter with the resident. I agree with the assessment, and status of the problem list as documented. Additional assessment/ plan  Prognosis is guarded. Progressive disease  Will discuss goals of care with family.    Continue supportive care       Samson Atkinson MD  Hematology Oncology  [phone]  Electronically signed by Dinh Haskins MD on 1/5/2021 at 8:54 PM

## 2021-01-06 ENCOUNTER — APPOINTMENT (OUTPATIENT)
Dept: GENERAL RADIOLOGY | Age: 56
DRG: 871 | End: 2021-01-06
Payer: COMMERCIAL

## 2021-01-06 LAB
ABSOLUTE EOS #: 0.15 K/UL (ref 0–0.4)
ABSOLUTE IMMATURE GRANULOCYTE: 0.15 K/UL (ref 0–0.3)
ABSOLUTE LYMPH #: 2.09 K/UL (ref 1–4.8)
ABSOLUTE MONO #: 1.19 K/UL (ref 0.1–0.8)
ALBUMIN SERPL-MCNC: 2.8 G/DL (ref 3.5–5.2)
ALBUMIN/GLOBULIN RATIO: 0.9 (ref 1–2.5)
ALLEN TEST: POSITIVE
ALP BLD-CCNC: 82 U/L (ref 35–104)
ALT SERPL-CCNC: 5 U/L (ref 5–33)
ANION GAP SERPL CALCULATED.3IONS-SCNC: 10 MMOL/L (ref 9–17)
AST SERPL-CCNC: 17 U/L
BASOPHILS # BLD: 1 % (ref 0–2)
BASOPHILS ABSOLUTE: 0.15 K/UL (ref 0–0.2)
BILIRUB SERPL-MCNC: <0.1 MG/DL (ref 0.3–1.2)
BILIRUBIN DIRECT: 0.08 MG/DL
BILIRUBIN, INDIRECT: ABNORMAL MG/DL (ref 0–1)
BUN BLDV-MCNC: 3 MG/DL (ref 6–20)
BUN/CREAT BLD: ABNORMAL (ref 9–20)
CALCIUM IONIZED: 1.12 MMOL/L (ref 1.13–1.33)
CALCIUM SERPL-MCNC: 8.3 MG/DL (ref 8.6–10.4)
CHLORIDE BLD-SCNC: 108 MMOL/L (ref 98–107)
CO2: 25 MMOL/L (ref 20–31)
CREAT SERPL-MCNC: 0.31 MG/DL (ref 0.5–0.9)
CULTURE: NO GROWTH
DIFFERENTIAL TYPE: ABNORMAL
EOSINOPHILS RELATIVE PERCENT: 1 % (ref 1–4)
FIO2: 30
GFR AFRICAN AMERICAN: >60 ML/MIN
GFR NON-AFRICAN AMERICAN: >60 ML/MIN
GFR SERPL CREATININE-BSD FRML MDRD: ABNORMAL ML/MIN/{1.73_M2}
GFR SERPL CREATININE-BSD FRML MDRD: ABNORMAL ML/MIN/{1.73_M2}
GLOBULIN: ABNORMAL G/DL (ref 1.5–3.8)
GLUCOSE BLD-MCNC: 104 MG/DL (ref 70–99)
HCT VFR BLD CALC: 34.8 % (ref 36.3–47.1)
HEMOGLOBIN: 10.1 G/DL (ref 11.9–15.1)
IMMATURE GRANULOCYTES: 1 %
LACTIC ACID, WHOLE BLOOD: 0.9 MMOL/L (ref 0.7–2.1)
LACTIC ACID, WHOLE BLOOD: 1 MMOL/L (ref 0.7–2.1)
LACTIC ACID, WHOLE BLOOD: 1.2 MMOL/L (ref 0.7–2.1)
LACTIC ACID: NORMAL MMOL/L
LACTIC ACID: NORMAL MMOL/L
LV EF: 40 %
LVEF MODALITY: NORMAL
LYMPHOCYTES # BLD: 14 % (ref 24–44)
Lab: NORMAL
MAGNESIUM: 2.2 MG/DL (ref 1.6–2.6)
MCH RBC QN AUTO: 22.9 PG (ref 25.2–33.5)
MCHC RBC AUTO-ENTMCNC: 29 G/DL (ref 28.4–34.8)
MCV RBC AUTO: 78.7 FL (ref 82.6–102.9)
MODE: ABNORMAL
MONOCYTES # BLD: 8 % (ref 1–7)
MORPHOLOGY: ABNORMAL
NEGATIVE BASE EXCESS, ART: ABNORMAL (ref 0–2)
NRBC AUTOMATED: 0 PER 100 WBC
O2 DEVICE/FLOW/%: ABNORMAL
PARTIAL THROMBOPLASTIN TIME: 22.9 SEC (ref 20.5–30.5)
PARTIAL THROMBOPLASTIN TIME: 23.6 SEC (ref 20.5–30.5)
PARTIAL THROMBOPLASTIN TIME: 23.7 SEC (ref 20.5–30.5)
PARTIAL THROMBOPLASTIN TIME: 25.1 SEC (ref 20.5–30.5)
PATIENT TEMP: ABNORMAL
PDW BLD-RTO: 25.2 % (ref 11.8–14.4)
PLATELET # BLD: 812 K/UL (ref 138–453)
PLATELET ESTIMATE: ABNORMAL
PMV BLD AUTO: 8.4 FL (ref 8.1–13.5)
POC HCO3: 28.4 MMOL/L (ref 21–28)
POC O2 SATURATION: 98 % (ref 94–98)
POC PCO2 TEMP: ABNORMAL MM HG
POC PCO2: 43 MM HG (ref 35–48)
POC PH TEMP: ABNORMAL
POC PH: 7.43 (ref 7.35–7.45)
POC PO2 TEMP: ABNORMAL MM HG
POC PO2: 98.9 MM HG (ref 83–108)
POSITIVE BASE EXCESS, ART: 4 (ref 0–3)
POTASSIUM SERPL-SCNC: 3.3 MMOL/L (ref 3.7–5.3)
RBC # BLD: 4.42 M/UL (ref 3.95–5.11)
RBC # BLD: ABNORMAL 10*6/UL
SAMPLE SITE: ABNORMAL
SEG NEUTROPHILS: 75 % (ref 36–66)
SEGMENTED NEUTROPHILS ABSOLUTE COUNT: 11.17 K/UL (ref 1.8–7.7)
SODIUM BLD-SCNC: 143 MMOL/L (ref 135–144)
SPECIMEN DESCRIPTION: NORMAL
SURGICAL PATHOLOGY REPORT: NORMAL
TCO2 (CALC), ART: 30 MMOL/L (ref 22–29)
TOTAL PROTEIN: 5.9 G/DL (ref 6.4–8.3)
WBC # BLD: 14.9 K/UL (ref 3.5–11.3)
WBC # BLD: ABNORMAL 10*3/UL

## 2021-01-06 PROCEDURE — 2580000003 HC RX 258: Performed by: NURSE PRACTITIONER

## 2021-01-06 PROCEDURE — 80076 HEPATIC FUNCTION PANEL: CPT

## 2021-01-06 PROCEDURE — 2580000003 HC RX 258: Performed by: STUDENT IN AN ORGANIZED HEALTH CARE EDUCATION/TRAINING PROGRAM

## 2021-01-06 PROCEDURE — 80048 BASIC METABOLIC PNL TOTAL CA: CPT

## 2021-01-06 PROCEDURE — 83735 ASSAY OF MAGNESIUM: CPT

## 2021-01-06 PROCEDURE — 85730 THROMBOPLASTIN TIME PARTIAL: CPT

## 2021-01-06 PROCEDURE — 82330 ASSAY OF CALCIUM: CPT

## 2021-01-06 PROCEDURE — 6370000000 HC RX 637 (ALT 250 FOR IP): Performed by: NURSE PRACTITIONER

## 2021-01-06 PROCEDURE — 94761 N-INVAS EAR/PLS OXIMETRY MLT: CPT

## 2021-01-06 PROCEDURE — 94003 VENT MGMT INPAT SUBQ DAY: CPT

## 2021-01-06 PROCEDURE — 36600 WITHDRAWAL OF ARTERIAL BLOOD: CPT

## 2021-01-06 PROCEDURE — 2500000003 HC RX 250 WO HCPCS: Performed by: FAMILY MEDICINE

## 2021-01-06 PROCEDURE — 83605 ASSAY OF LACTIC ACID: CPT

## 2021-01-06 PROCEDURE — C9113 INJ PANTOPRAZOLE SODIUM, VIA: HCPCS | Performed by: STUDENT IN AN ORGANIZED HEALTH CARE EDUCATION/TRAINING PROGRAM

## 2021-01-06 PROCEDURE — 6360000002 HC RX W HCPCS: Performed by: STUDENT IN AN ORGANIZED HEALTH CARE EDUCATION/TRAINING PROGRAM

## 2021-01-06 PROCEDURE — 99233 SBSQ HOSP IP/OBS HIGH 50: CPT | Performed by: INTERNAL MEDICINE

## 2021-01-06 PROCEDURE — 85025 COMPLETE CBC W/AUTO DIFF WBC: CPT

## 2021-01-06 PROCEDURE — 6360000002 HC RX W HCPCS: Performed by: INTERNAL MEDICINE

## 2021-01-06 PROCEDURE — 2000000003 HC NEURO ICU R&B

## 2021-01-06 PROCEDURE — 99291 CRITICAL CARE FIRST HOUR: CPT | Performed by: PSYCHIATRY & NEUROLOGY

## 2021-01-06 PROCEDURE — 99232 SBSQ HOSP IP/OBS MODERATE 35: CPT | Performed by: INTERNAL MEDICINE

## 2021-01-06 PROCEDURE — 71045 X-RAY EXAM CHEST 1 VIEW: CPT

## 2021-01-06 PROCEDURE — 2580000003 HC RX 258: Performed by: INTERNAL MEDICINE

## 2021-01-06 PROCEDURE — 6370000000 HC RX 637 (ALT 250 FOR IP): Performed by: STUDENT IN AN ORGANIZED HEALTH CARE EDUCATION/TRAINING PROGRAM

## 2021-01-06 PROCEDURE — 6360000002 HC RX W HCPCS: Performed by: FAMILY MEDICINE

## 2021-01-06 PROCEDURE — 6360000002 HC RX W HCPCS: Performed by: NURSE PRACTITIONER

## 2021-01-06 PROCEDURE — 82803 BLOOD GASES ANY COMBINATION: CPT

## 2021-01-06 PROCEDURE — 2700000000 HC OXYGEN THERAPY PER DAY

## 2021-01-06 PROCEDURE — 93306 TTE W/DOPPLER COMPLETE: CPT

## 2021-01-06 PROCEDURE — 36415 COLL VENOUS BLD VENIPUNCTURE: CPT

## 2021-01-06 RX ORDER — FUROSEMIDE 10 MG/ML
10 INJECTION INTRAMUSCULAR; INTRAVENOUS ONCE
Status: COMPLETED | OUTPATIENT
Start: 2021-01-06 | End: 2021-01-06

## 2021-01-06 RX ORDER — CALCIUM GLUCONATE 20 MG/ML
1 INJECTION, SOLUTION INTRAVENOUS ONCE
Status: COMPLETED | OUTPATIENT
Start: 2021-01-06 | End: 2021-01-06

## 2021-01-06 RX ORDER — 0.9 % SODIUM CHLORIDE 0.9 %
500 INTRAVENOUS SOLUTION INTRAVENOUS ONCE
Status: COMPLETED | OUTPATIENT
Start: 2021-01-06 | End: 2021-01-06

## 2021-01-06 RX ADMIN — LEVETIRACETAM 1500 MG: 15 INJECTION INTRAVENOUS at 20:49

## 2021-01-06 RX ADMIN — SODIUM CHLORIDE, PRESERVATIVE FREE 10 ML: 5 INJECTION INTRAVENOUS at 20:49

## 2021-01-06 RX ADMIN — PANTOPRAZOLE SODIUM 40 MG: 40 INJECTION, POWDER, LYOPHILIZED, FOR SOLUTION INTRAVENOUS at 08:04

## 2021-01-06 RX ADMIN — ENOXAPARIN SODIUM 40 MG: 40 INJECTION SUBCUTANEOUS at 11:08

## 2021-01-06 RX ADMIN — SODIUM CHLORIDE 500 ML: 9 INJECTION, SOLUTION INTRAVENOUS at 12:59

## 2021-01-06 RX ADMIN — PANTOPRAZOLE SODIUM 40 MG: 40 INJECTION, POWDER, LYOPHILIZED, FOR SOLUTION INTRAVENOUS at 20:49

## 2021-01-06 RX ADMIN — PROPOFOL 35 MCG/KG/MIN: 10 INJECTION, EMULSION INTRAVENOUS at 11:08

## 2021-01-06 RX ADMIN — MEROPENEM 1 G: 1 INJECTION, POWDER, FOR SOLUTION INTRAVENOUS at 17:04

## 2021-01-06 RX ADMIN — POTASSIUM BICARBONATE 40 MEQ: 782 TABLET, EFFERVESCENT ORAL at 08:03

## 2021-01-06 RX ADMIN — FERROUS SULFATE TAB EC 325 MG (65 MG FE EQUIVALENT) 325 MG: 325 (65 FE) TABLET DELAYED RESPONSE at 08:04

## 2021-01-06 RX ADMIN — MEROPENEM 1 G: 1 INJECTION, POWDER, FOR SOLUTION INTRAVENOUS at 00:53

## 2021-01-06 RX ADMIN — SERTRALINE 50 MG: 50 TABLET, FILM COATED ORAL at 08:03

## 2021-01-06 RX ADMIN — CALCIUM GLUCONATE 1 G: 20 INJECTION, SOLUTION INTRAVENOUS at 11:09

## 2021-01-06 RX ADMIN — LEVETIRACETAM 1500 MG: 15 INJECTION INTRAVENOUS at 07:59

## 2021-01-06 RX ADMIN — FUROSEMIDE 10 MG: 10 INJECTION, SOLUTION INTRAMUSCULAR; INTRAVENOUS at 18:03

## 2021-01-06 RX ADMIN — SODIUM CHLORIDE: 9 INJECTION, SOLUTION INTRAVENOUS at 07:50

## 2021-01-06 RX ADMIN — ATORVASTATIN CALCIUM 20 MG: 20 TABLET, FILM COATED ORAL at 08:04

## 2021-01-06 RX ADMIN — MEROPENEM 1 G: 1 INJECTION, POWDER, FOR SOLUTION INTRAVENOUS at 08:17

## 2021-01-06 RX ADMIN — SODIUM CHLORIDE, PRESERVATIVE FREE 10 ML: 5 INJECTION INTRAVENOUS at 08:03

## 2021-01-06 ASSESSMENT — PULMONARY FUNCTION TESTS
PIF_VALUE: 12
PIF_VALUE: 11
PIF_VALUE: 13
PIF_VALUE: 11
PIF_VALUE: 17
PIF_VALUE: 11

## 2021-01-06 NOTE — PROGRESS NOTES
Order obtained for extubation. SpO2 of 99 on 30% FiO2. Patient extubated and placed on 2LPM  via nasal cannula. Post extubation SpO2 is 100% with HR  89 bpm and RR 18 breaths/min. Patient had strong cough that was non-productive. Extubation Well tolerated by patient. .   Breath Sounds: clear    Rolandoet Evelio   1:46 PM

## 2021-01-06 NOTE — PLAN OF CARE
Problem: OXYGENATION/RESPIRATORY FUNCTION  Goal: Patient will maintain patent airway  Outcome: Ongoing  Goal: Patient will achieve/maintain normal respiratory rate/effort  Description: Respiratory rate and effort will be within normal limits for the patient  Outcome: Ongoing     Problem: Coping:  Goal: Ability to cope will improve  Description: Ability to cope will improve  Outcome: Ongoing  Goal: Ability to identify appropriate support needs will improve  Description: Ability to identify appropriate support needs will improve  Outcome: Ongoing     Problem: Health Behavior:  Goal: Ability to manage health-related needs will improve  Description: Ability to manage health-related needs will improve  Outcome: Ongoing     Problem: Physical Regulation:  Goal: Signs of adequate cerebral perfusion will increase  Description: Signs of adequate cerebral perfusion will increase  Outcome: Ongoing  Goal: Ability to maintain a stable neurologic state will improve  Description: Ability to maintain a stable neurologic state will improve  Outcome: Ongoing     Problem: Safety:  Goal: Ability to remain free from injury will improve  Description: Ability to remain free from injury will improve  Outcome: Ongoing     Problem: Self-Concept:  Goal: Level of anxiety will decrease  Description: Level of anxiety will decrease  Outcome: Ongoing  Goal: Ability to verbalize feelings about condition will improve  Description: Ability to verbalize feelings about condition will improve  Outcome: Ongoing     Problem: Nutrition  Goal: Optimal nutrition therapy  Description: Nutrition Problem #1: Inadequate oral intake  Intervention: Food and/or Nutrient Delivery: Continue NPO  Nutritional Goals: Meet at least 75% of estimated nutrient needs     Outcome: Ongoing     Problem: Skin Integrity:  Goal: Will show no infection signs and symptoms  Description: Will show no infection signs and symptoms  Outcome: Ongoing  Goal: Absence of new skin breakdown

## 2021-01-06 NOTE — PROGRESS NOTES
Patient was admitted to the neuro ICU. She was continued on broad-spectrum antibiotics. ID consult was obtained. Antibiotics were switched to meropenem. Preliminary culture with gram-negative rods. General surgery was consulted with no acute surgical intervention at this time. Last 24h:   No acute events overnight. Patient able to follow commands.      CURRENT MEDICATIONS:  SCHEDULED MEDICATIONS:   potassium bicarb-citric acid  40 mEq Oral Daily    meropenem  1 g Intravenous Q8H    sodium chloride flush  10 mL Intravenous 2 times per day    ferrous sulfate  325 mg Oral Daily with breakfast    sertraline  50 mg Oral Daily    atorvastatin  20 mg Oral Daily    levetiracetam  1,500 mg Intravenous Q12H    pantoprazole  40 mg Intravenous BID     CONTINUOUS INFUSIONS:   propofol 35 mcg/kg/min (21 0214)    sodium chloride 100 mL/hr at 21 0133     PRN MEDICATIONS:   sodium chloride flush, sodium chloride flush, promethazine **OR** ondansetron, polyethylene glycol, acetaminophen **OR** acetaminophen, HYDROcodone-acetaminophen    VITALS:  Temperature Range: Temp: 97.6 °F (36.4 °C) Temp  Av.4 °F (36.9 °C)  Min: 97.4 °F (36.3 °C)  Max: 99.2 °F (37.3 °C)  BP Range: Systolic (90EBL), IYS:085 , Min:87 , FLT:358     Diastolic (59EHG), XZS:29, Min:58, Max:84    Pulse Range: Pulse  Av.2  Min: 81  Max: 112  Respiration Range: Resp  Av.8  Min: 13  Max: 29  Current Pulse Ox: SpO2: 98 %  24HR Pulse Ox Range: SpO2  Av.2 %  Min: 97 %  Max: 100 %  Patient Vitals for the past 12 hrs:   BP Temp Temp src Pulse Resp SpO2   21 0725  97.6 °F (36.4 °C) Axillary 86 15 98 %   21 0700 104/66   81 13 98 %   21 0600 101/65   81 18 99 %   21 0500 120/77   100 18 99 %   21 0430     16 99 %   21 0400 107/68 98.8 °F (37.1 °C)  93 18 99 %   21 0313    98 15 98 %   21 0300 (!)    99 15 98 %   21 0200    103 18 98 % 01/06/21 0100 88/61   111 18 98 %   01/06/21 0000 105/66 99.2 °F (37.3 °C)  105 18 98 %   01/05/21 2342    105 18 97 %   01/05/21 2300 99/66   107 20 98 %   01/05/21 2200 106/68   99 19 98 %   01/05/21 2100 104/61   112 20 98 %   01/05/21 2000 104/84 99 °F (37.2 °C)  104 20 98 %   01/05/21 1956    103 29 98 %   01/05/21 1946  97.7 °F (36.5 °C) Axillary        Estimated body mass index is 26.63 kg/m² as calculated from the following:    Height as of this encounter: 5' 6\" (1.676 m). Weight as of this encounter: 165 lb (74.8 kg).  []<16 Severe malnutrition  []1616.99 Moderate malnutrition  []1718.49 Mild malnutrition  []18.524.9 Normal  [x]2529.9 Overweight (not obese)  []3034.9 Obese class 1 (Low Risk)  []3539.9 Obese class 2 (Moderate Risk)  []?40 Obese class 3 (High Risk)    RECENT LABS:   Lab Results   Component Value Date    WBC 14.9 (H) 01/06/2021    HGB 10.1 (L) 01/06/2021    HCT 34.8 (L) 01/06/2021     (H) 01/06/2021    ALT 5 01/06/2021    AST 17 01/06/2021     01/06/2021    K 3.3 (L) 01/06/2021     (H) 01/06/2021    CREATININE 0.31 (L) 01/06/2021    BUN 3 (L) 01/06/2021    CO2 25 01/06/2021    TSH 0.93 10/22/2020    INR 1.0 10/23/2020     24 HOUR INTAKE/OUTPUT:    Intake/Output Summary (Last 24 hours) at 1/6/2021 0738  Last data filed at 1/6/2021 0700  Gross per 24 hour   Intake 3650 ml   Output 3150 ml   Net 500 ml       IMAGING:   XR CHEST PORTABLE   Final Result   Overall stable examination. Cardiomegaly and pulmonary vascular congestion. Small left pleural effusion versus pleural scarring. CT CHEST ABDOMEN PELVIS W CONTRAST   Final Result   Interval placement of a percutaneous pigtail catheter left splenic complex   mass. Mesenteric, retroperitoneal, and left inguinal pathologic lymphadenopathy   consistent with metastatic disease. Multiple pulmonary nodules consistent with metastatic disease. Prominent   right hilar lymph node. New left lower lobe consolidation and effusion. Diffuse bony blastic metastatic disease. MRI BRAIN W WO CONTRAST   Final Result   1. No acute intracranial abnormality. Specifically, no acute infarction,   mesial temporal sclerosis, or intracranial metastatic disease. 2. Mild parenchymal volume loss. Sequela of mild chronic microvascular   ischemic changes. XR CHEST PORTABLE   Final Result   Stable chest      Stable support lines      Possible mild edema with moderate left perihilar, left basilar opacity   related to combined pleuroparenchymal process         XR ABDOMEN FOR NG/OG/NE TUBE PLACEMENT   Final Result   Enteric tube in the stomach. Other incidental findings as above. XR CHEST PORTABLE   Final Result   ET tube as above. Life support apparatus otherwise stable. Mild-to-moderate   edema and small left effusion unchanged. XR CHEST PORTABLE    (Results Pending)         Labs and Images reviewed with:  [x] Dr. Isela Chappell. Sharon    [] Dr. Thelma Alexis  [] Dr. Thu Ryan  [] There are no new interval images to review. PHYSICAL EXAM       CONSTITUTIONAL:  Well developed, ill-appearing. Intubated/sedated   HEAD:  normocephalic, atraumatic    EYES:  PERRLA, EOMI.   ENT:  moist mucous membranes   NECK:  supple, symmetric   LUNGS:  Equal air entry bilaterally   CARDIOVASCULAR:  normal s1 / s2, RRR, distal pulses intact   ABDOMEN:  Soft, no rigidity   NEUROLOGIC:  Mental Status:  Opens eyes to voice, follows commands             Cranial Nerves:    Positive cough, gag, corneal    Motor Exam:    Moving all 4 extremities    Sensory:    Touch: Withdraws to pain    Deep Tendon Reflexes:    Right Bicep:  2+  Left Bicep:  2+  Right Knee:  2+  Left Knee:  2+    Plantar Response:  Right:  downgoing  Left:  downgoing    Gait : not tested       DRAINS:  [] There are no drains for Neuro Critical Care to monitor at this time.      ASSESSMENT AND PLAN: 12-year-old female with history of diabetes, seizures, metastatic ovarian cancer presented as a transfer from [place] ED for status epilepticus. NEUROLOGIC:  - Imaging: MRI Brain with no acute finding or evidence of metastatic disease  - AEDs Keppra 1500mg BID  - Goal SBP <160  - Neuro checks per protocol    CARDIOVASCULAR:  - Goal SBP <160  - Continue telemetry    PULMONARY:  - Vent Settings: PRVC  - Daily ABG: pH7.428/PCO2 43.0/.9/MBN242.4  - CXR stable, small left pleural effusion    RENAL/FLUID/ELECTROLYTE:  - BUN 3/ Creatinine 0.31  - Urine output 0.4 ml/kg/hr  - IVF: increased to 100cc/hr  - Replace electrolytes PRN  - Daily BMP    GI/NUTRITION:  NUTRITION:  Diet NPO, After Midnight  - Bowel regimen: glycolax prn  - GI prophylaxis: PPI    ID:  - Tmax 99.2  - WBC 14.9 <- 19.9  - on Meropenem  - ID on board  - Infectious work up in process  - Continue to monitor for fevers  - Daily CBC    HEME:   - H&H 10.1/34.8  - Platelets 322  - Daily CBC  - Heme/onc on board    ENDOCRINE:  - Continue to monitor blood glucose, goal <180    OTHER:  - PT/OT/ST   - Code Status: Full    PROPHYLAXIS:  Stress ulcer: PPI    DVT PROPHYLAXIS:  - SCD sleeves - Thigh High   - Lovenox    DISPOSITION:  [x] To remain ICU:  [] OK for out of ICU from Neuro Critical Care standpoint    We will continue to follow along. For any changes in exam or patient status please contact Neuro Critical Care.       Callie Wallace MD   Neurology PGY-2  Neuro Critical Care  1/6/2021     7:38 AM

## 2021-01-06 NOTE — CONSULTS
General Surgery:  Consult Note        PATIENT NAME: [facility]   YOB: 1965    ADMISSION DATE: 1/4/2021  4:09 PM     Admitting Provider: Dr Yassine Becerril Physician: Dr Amisha Leyva DATE: 1/5/2021    Chief Complaint: Status epilepticus    Consult Regarding: Splenic abscess    HISTORY OF PRESENT ILLNESS:  The patient is a 60 y.o. female with complicated medical history including metastatic ovarian cancer status post bilateral salpingo-oophorectomy, hysterectomy who presented as a transfer from [place] for status epilepticus. The patient was reportedly found altered by her family. She had witnessed seizure activity and required endotracheal intubation and sedation with propofol. Patient had recent MRI concerning for PRES and new seizure diagnosis. The patient was also recently admitted to [facility] with a GI bleed as well as splenic embolization. Patient subsequently developed splenic abscess requiring interventional radiology drainage. Patient was being treated with [employer] by infectious disease. At present, the patient is intubated sedated. Mildly tachycardic in the low 100s. Blood pressure stable not requiring any vasopressor support. Afebrile. Past Medical History:        Diagnosis Date    Anemia     Bleeding 10/2020    intra-abdominal bleeding -due to splenic mass with GI infiltration. Status post embolization    Cervical cancer ([address]     Depression     Diabetes mellitus ([address]     GERD (gastroesophageal reflux disease)     Metastatic cancer ([address] 10/2020    extensive intraabdominal and splenic involvement and lung mets.     Ovarian cancer ([address]     low grade serous ovarian carcinoma    Post chemo evaluation     2007: Chemo via med onc (Dr. Veto Humphries), 2008: [place] [facility] due to rising CA-125, 2013: intraperitoneal chemo,12/2015: Ca125 - 25     Splenic lesion        Past Surgical History:        Procedure Laterality Date    ABSCESS DRAINAGE  2013    Franca BMI 26.63 kg/m²   INTAKE/OUTPUT:     Intake/Output Summary (Last 24 hours) at 1/5/2021 1928  Last data filed at 1/5/2021 1900  Gross per 24 hour   Intake 3143.2 ml   Output 5285 ml   Net -2141.8 ml     CONSTITUTIONAL: Intubated and sedated  HEENT: Normocephalic/atraumatic  NECK:  Supple, symmetrical, trachea midline   CARDIOVASCULAR: Tachycardic rate, regular rhythm  LUNGS: Intubated, synchronous with vent  ABDOMEN: soft, nondistended, nontender, well healed midline laparotomy incision, LUQ IR drain with purulent fluid in bulb  MUSCULOSKELETAL: Passive ROM intact  NEUROLOGIC: Sedated  SKIN: No cyanosis, rashes, or edema noted    CBC with Differential:    Lab Results   Component Value Date    WBC 19.9 01/05/2021    RBC 4.71 01/05/2021    HGB 10.7 01/05/2021    HCT 36.4 01/05/2021     01/05/2021    MCV 77.3 01/05/2021    MCH 22.7 01/05/2021    MCHC 29.4 01/05/2021    RDW 24.8 01/05/2021    LYMPHOPCT 15 01/05/2021    MONOPCT 7 01/05/2021    BASOPCT 1 01/05/2021    MONOSABS 1.39 01/05/2021    LYMPHSABS 2.99 01/05/2021    EOSABS 0.00 01/05/2021    BASOSABS 0.20 01/05/2021    DIFFTYPE NOT REPORTED 01/05/2021     BMP:    Lab Results   Component Value Date     01/05/2021    K 3.1 01/05/2021    CL 96 01/05/2021    CO2 22 01/05/2021    BUN 2 01/05/2021    LABALBU 3.0 01/05/2021    CREATININE 0.21 01/05/2021    CALCIUM 8.2 01/05/2021    GFRAA >60 01/05/2021    LABGLOM >60 01/05/2021    GLUCOSE 118 01/05/2021       Pertinent Radiology:   Ct Head Wo Contrast    Result Date: 1/4/2021  EXAMINATION: CT OF THE HEAD WITHOUT CONTRAST  1/4/2021 9:59 am TECHNIQUE: CT of the head was performed without the administration of intravenous contrast. Dose modulation, iterative reconstruction, and/or weight based adjustment of the mA/kV was utilized to reduce the radiation dose to as low as reasonably achievable.  COMPARISON: 08/20/2020, 08/04/2020 HISTORY: ORDERING SYSTEM PROVIDED HISTORY: seizure TECHNOLOGIST PROVIDED HISTORY: seizure Reason for Exam: Seizure, LOC. Pt is intubated. Hx of ovarian CA. Acuity: Acute Type of Exam: Initial Relevant Medical/Surgical History: ovarian CA, DM FINDINGS: BRAIN/VENTRICLES: There is no acute intracranial hemorrhage, mass effect or midline shift. No abnormal extra-axial fluid collection. The gray-white differentiation is maintained without evidence of an acute infarct. Mild scattered regions of low attenuation within the periventricular, subcortical, and deep white matter, presumably sequelae of chronic microangiopathic ischemic white matter change, though ultimately age indeterminate in the absence of prior comparison. Mild volume loss with commensurate ventricular and sulcal prominence. There is no evidence of hydrocephalus. ORBITS: The visualized portion of the orbits demonstrate no acute abnormality. SINUSES: There are air-fluid levels with aerated secretions within the sphenoid sinuses and ethmoid air cells on a background of mild mucosal thickening. Small retention cysts are present within the partially imaged maxillary sinuses. Mastoid air cells are clear. SOFT TISSUES/SKULL:  Heterogeneous mineralization of the calvarium, clivus, and partially imaged upper cervical spine. No fracture. Superficial scalp soft tissues are without focal abnormality. Endotracheal and enteric tubes traverse the nasopharynx and oropharynx, only partially imaged. No acute intracranial hemorrhage, mass-effect, midline shift, or abnormal extra-axial collection. In the setting of known malignant neoplasm with seizure, contrast-enhanced brain MRI is recommended to further evaluate for intracranial metastatic disease. Minimal regions of low attenuation within the periventricular, subcortical, and deep white matter, presumably sequelae of chronic microangiopathic ischemic white matter change, though ultimately age indeterminate without prior comparison imaging. Recommend attention on follow-up.  Heterogeneous mineralization is suggested within the calvarium, clivus, and partially imaged upper cervical spine, suspicious for osseous metastatic disease in the setting of a known primary neoplasm. Attention on follow-up MRI. Air-fluid levels with aerated secretions in the sphenoid sinuses and ethmoid air cells, which may relate to intubated status versus underlying pre-existing acute sinusitis. Xr Chest Portable    Result Date: 1/5/2021  EXAMINATION: ONE XRAY VIEW OF THE CHEST 1/5/2021 6:11 am COMPARISON: January 4, 2021, chest exam HISTORY: ORDERING SYSTEM PROVIDED HISTORY: Intubated, eval for acute findings TECHNOLOGIST PROVIDED HISTORY: Intubated, eval for acute findings Reason for Exam: supine port Acuity: Unknown Type of Exam: Ongoing FINDINGS: Stable right IJ port catheter, endotracheal, nasogastric tubes and left basilar pigtail catheter without definite pneumothorax Stable cardiac silhouette Persistent mild streaky right basilar atelectasis and moderate left perihilar/left basilar opacity with possible mild vascular congestion     Stable chest Stable support lines Possible mild edema with moderate left perihilar, left basilar opacity related to combined pleuroparenchymal process     Xr Chest Portable    Result Date: 1/4/2021  EXAMINATION: ONE XRAY VIEW OF THE CHEST 1/4/2021 5:02 pm COMPARISON: January 4, 2021 HISTORY: ORDERING SYSTEM PROVIDED HISTORY: evaluate ETT position TECHNOLOGIST PROVIDED HISTORY: evaluate ETT position Reason for Exam: ett position FINDINGS: ET and enteric tubes unchanged. ET tube appears to terminate 42 mm above the alejandro. Right IJ MediPort unchanged. Small left effusion. Mild edema. Pigtail catheter left lung base. Metallic coils left upper quadrant of the abdomen. Heart and mediastinum normal.  Bony thorax intact. Subsegmental atelectasis right base. ET tube as above. Life support apparatus otherwise stable. Mild-to-moderate edema and small left effusion unchanged.      Maco Mater Chest Portable    Result Date: 1/4/2021  EXAMINATION: ONE XRAY VIEW OF THE CHEST 1/4/2021 11:38 am COMPARISON: 10/24/2020 HISTORY: ORDERING SYSTEM PROVIDED HISTORY: post intubation TECHNOLOGIST PROVIDED HISTORY: post intubation Reason for Exam: LOC. NG tube & ET tube placed Acuity: Acute Type of Exam: Initial FINDINGS: Endotracheal tube is in place with its tip terminating approximately 4.4 cm above the alejandro. Enteric tube courses below the left hemidiaphragm. A left basilar pigtail chest tube catheter noted in place. There is a residual small to moderate-sized left-sided effusion with adjacent airspace disease. Right hemithorax is clear. Vascular port terminates overlying the expected location of the SVC. No convincing evidence for pneumothorax. Vascular coiling identified within the upper abdomen. Small to moderate left pleural effusion with adjacent airspace disease. Left-sided chest tube terminates overlying the left lung base. Endotracheal and enteric tubes as above. Xr Abdomen For Ng/og/ne Tube Placement    Result Date: 1/4/2021  EXAMINATION: ONE SUPINE XRAY VIEW(S) OF THE ABDOMEN 1/4/2021 10:28 pm COMPARISON: None. HISTORY: ORDERING SYSTEM PROVIDED HISTORY: Confirmation of course of NG/OG/NE tube and location of tip of tube TECHNOLOGIST PROVIDED HISTORY: Confirmation of course of NG/OG/NE tube and location of tip of tube Portable? ->Yes Reason for Exam: supine FINDINGS: Enteric tube in the stomach. Tubing left lower quadrant of the abdomen requires clinical correlation. 38 x 44 mm a radiodensity right lower quadrant. Nonspecific bowel gas pattern. Osseous structures normal.     Enteric tube in the stomach. Other incidental findings as above.      Ct Chest Abdomen Pelvis W Contrast    Result Date: 1/5/2021  EXAMINATION: CT OF THE CHEST, ABDOMEN, AND PELVIS WITH CONTRAST 1/5/2021 3:42 pm TECHNIQUE: CT of the chest, abdomen and pelvis was performed with the administration of intravenous contrast. Multiplanar reformatted images are provided for review. Dose modulation, iterative reconstruction, and/or weight based adjustment of the mA/kV was utilized to reduce the radiation dose to as low as reasonably achievable. COMPARISON: PET-CT August 20, 2020 HISTORY: ORDERING SYSTEM PROVIDED HISTORY: concern for worsening splenic abscess TECHNOLOGIST PROVIDED HISTORY: concern for worsening splenic abscess Reason for Exam: concern for worsening splenic abscess Acuity: Unknown Type of Exam: Unknown FINDINGS: Chest: Mediastinum: ET and enteric tubes noted in appropriate position. Right internal jugular catheter terminates in the right atrium. Small pericardial effusion. Calcific coronary artery disease. Heart and great vessels otherwise unremarkable. Prominent right hilar lymphadenopathy noted measuring up to 17 mm. Mediastinal lymphadenopathy measuring up to 9 x 14 mm in the prevascular space. Lungs/pleura: Alessandro Fallow Moderate left effusion and left lower lobe airspace disease. 5 mm pulmonary nodule left upper lobe image number 41 3 mm pulmonary nodule left upper lobe image number 43 another 3 mm nodule noted on image 46. At least 3 pulmonary nodules noted on image 57 and 58 measuring up to 5 mm. 3 mm nodule noted near the fissure right lower lobe image 41.  3 mm nodule right upper lobe image number 27 2 mm nodule right upper lobe image number 49 3 by-4 mm nodule right middle lobe image number 57 5 mm nodule right middle lobe image 63. Multiple pulmonary nodules noted at the right lung base measuring up to 4 to 5 mm. Calcified mass anterior sulcus on the right appears stable measuring 14 x 26 mm. Soft Tissues/Bones: T11 and T12 vertebral body sclerotic lesions. Abdomen/Pelvis: Organs: Percutaneous catheter left anterior abdominal wall terminates in the pelvis. The liver,, pancreas, and adrenals appear normal.  There appears to be a left upper quadrant pigtail catheter which is new.   Splenic complex cystic mass possibly with coarse calcifications is difficult to assess due to beam hardening artifact from local metallic coils. Oral contrast is noted in the stomach and small bowel. Large gallstone. Kidneys appear normal. Decompressed with a Mercado catheter. GI/Bowel: The stomach,small bowel, and colon appear normal. There is increased stool throughout the colon. Oral contrast is noted in the small bowel and colon and stomach. Pelvis: There appear to be multiple new mesenteric nodules in the pelvis measuring up to 24 x 38 mm prominent left inguinal lymphadenopathy is noted. Left pelvic sidewall lymphadenopathy appears unchanged. Confluency masses measure up to 17 x 32 mm. Peritoneum/Retroperitoneum: Prominent heterogeneous confluency retroperitoneal lymphadenopathy measures up to 26 x 31 mm on the left and 16 x 36 mm on the right. Bones/Soft Tissues: Sclerotic lesions noted L1, L5 and right ischium which appear more conspicuous. The largest measures 8 mm in the right ischium. Interval placement of a percutaneous pigtail catheter left splenic complex mass. Mesenteric, retroperitoneal, and left inguinal pathologic lymphadenopathy consistent with metastatic disease. Multiple pulmonary nodules consistent with metastatic disease. Prominent right hilar lymph node. New left lower lobe consolidation and effusion. Diffuse bony blastic metastatic disease. Mri Brain W Wo Contrast    Result Date: 1/5/2021  EXAMINATION: MRI OF THE BRAIN WITHOUT AND WITH CONTRAST  1/5/2021 11:23 am TECHNIQUE: Multiplanar multisequence MRI of the head/brain was performed without and with the administration of intravenous contrast. COMPARISON: Head CT 01/04/2021 HISTORY: ORDERING SYSTEM PROVIDED HISTORY: seizure, history of metastatic cancer TECHNOLOGIST PROVIDED HISTORY: seizure, history of metastatic cancer Reason for Exam: seizure, hx of mets FINDINGS: INTRACRANIAL STRUCTURES/VENTRICLES: Motion degraded examination. No acute infarction.   No mass effect or midline shift. No acute intracranial hemorrhage. Mild parenchymal volume loss with enlargement of the ventricles and cerebral sulci. Minimal periventricular and subcortical white matter T2/FLAIR hyperintense signal.  The sellar/suprasellar regions appear unremarkable. The normal signal voids within the major intracranial vessels appear maintained. No abnormal focus of enhancement is seen within the brain. Coronal T2 and FLAIR sequences demonstrate symmetric signal intensity and volume in the hippocampi bilaterally. ORBITS: The visualized portion of the orbits demonstrate no acute abnormality. SINUSES: Mucosal thickening and fluid in the paranasal sinuses. Fluid in the mastoid air cells. BONES/SOFT TISSUES: The bone marrow signal intensity appears normal. The soft tissues demonstrate no acute abnormality. 1. No acute intracranial abnormality. Specifically, no acute infarction, mesial temporal sclerosis, or intracranial metastatic disease. 2. Mild parenchymal volume loss. Sequela of mild chronic microvascular ischemic changes. ASSESSMENT:  Active Hospital Problems    Diagnosis Date Noted    History of malignant neoplasm of ovary [Z85.43]     Status epilepticus ([address] [G40.901] 01/04/2021    Seizure ([address] [R56.9] 10/21/2020     78-year-old female with metastatic ovarian cancer, status epilepticus, recent splenic embolization and subsequent abscess formation with IR drain    Plan:  1. Continue medical management per primary  2. Suspect status epilepticus could be responsible for a degree of leukocytosis  3. Recommend repeat AM CBC  4. Continue abx per ID recs and fluid sensitivities  5. If concern for malfunctioning or malpositioned drain would recommend IR consultation and replacement or upsizing  6. No surgical intervention at this time  7.  General Surgery to sign off    Electronically signed by Twin Barnes MD  on 1/5/2021 at 7:28 PM

## 2021-01-06 NOTE — PROGRESS NOTES
[facility] -   Infectious diseases evaluation  admission date 1/4/2021    reason for consultation:   leukocytosis    Impression :   Current:  · splenic abscess - GNR - drained 12/2020  · Post spleen embolization for mass, 10/2020 STL  · ovarian metastatic cancer  · 1/5 - SZ and status epilepticus  · intubated  · bandemia  · Rash on ceftriaxone, [facility]  Discussion / summary of stay / plan of care   ·   Recommendations   · Keep meropenem for now-adjust to final culture from our hepatic spleen abscess culture  · Discussed with neurosurgery  · She remains full code, oncology on board    Infection Control Recommendations   · [place] Precautions  · Contact Isolation     Antimicrobial Stewardship Recommendations   · Simplification of therapy  · Targeted therapy    Coordination ofOutpatient Care:   · Estimated Length of IV antimicrobials:  · Patient will need Midline / picc Catheter Insertion:   · Patient will need SNF:  · Patient will need outpatient wound care:     History of Present Illness:   Initial history:  Jossie Servin is a 60y.o.-year-old female w ovarian CA came w status epilepticus, found down w SZ, intubated. Hx ovarian CA w mets to abd and lungs,  Recent GI bleed STL 10/2020, post embolization of the spleen for splenic mass. 12/2020 admitted w left splenic abscess and drained and sent recently on invanz [aspirate showed E. coli sensitive to ceftriaxone, Bacteroides, strep viridans. When the patient was given ceftriaxone he had a rash and allergy reaction, hence it was stopped and he was sent on Invanz]  - left pleural fluid aspirated was then neg-    At admission to [facility],  found w tarry fluid from the NGT and clots in ETT. possibly from nasal attempted intubation - cleared alter  On-  Still full code. Left spleen drainage purulent. And now cx w GNR  Leukocytosis noticed, we are called for opinion. 1/5 left perisplenic fluid aspirated and grew GNR          Interval changes  1/6/2021   Patient Vitals for the past 8 hrs:   BP Temp Temp src Pulse Resp SpO2   01/06/21 1140  98.1 °F (36.7 °C) Axillary 87 17 99 %   01/06/21 1102 114/72   91 16 99 %   01/06/21 1002 103/64   87 15 99 %   01/06/21 0902 103/70   93 17 99 %   01/06/21 0810    94 17 99 %   01/06/21 0801 116/68 97.6 °F (36.4 °C) Axillary 83 16 98 %   01/06/21 0725  97.6 °F (36.4 °C) Axillary 86 15 98 %   01/06/21 0700 104/66   81 13 98 %   01/06/21 0600 101/65   81 18 99 %   01/06/21 0500 120/77   100 18 99 %   01/06/21 0430     16 99 %     She is now alert appropriate she is on the ventilator, son on the bedside,  Drain with purulence, gram-negative rods nonlactose fermenting so far, pending  Continue meropenem which she is tolerating, no signs of sepsis  White count is improving  Oncology on board    Summary of relevant labs:  Labs:  WBC 32 -14  Micro:  BC 1/5 x 2  U cx 1/5   Perisplenic fluid  1/5/21  · cx 1/5 GNR,   · LDH 85952  · RBC 91645  · WBC 140296    1/4 sputum negative for malignancy    Imaging:  CT AP 1/5  Interval placement of a percutaneous pigtail catheter left splenic complex   mass. Mesenteric, retroperitoneal, and left inguinal pathologic lymphadenopathy   consistent with metastatic disease. Multiple pulmonary nodules consistent with metastatic disease.  Prominent   right hilar lymph node. New left lower lobe consolidation and effusion. Diffuse bony blastic metastatic disease. MRI brain   Impression:  1. No acute intracranial abnormality. Specifically, no acute infarction, mesial temporal sclerosis, or intracranial metastatic disease. 2. Mild parenchymal volume loss. Sequela of mild chronic microvascular   ischemic changes.      CXR 1/5/21  Left basilar opacity and left perihilar infiukltrate I have personally reviewed the past medical history, past surgical history, medications, social history, and family history, and I haveupdated the database accordingly. Allergies:   Ceftriaxone     Review of Systems:     Review of Systems   Unable to perform ROS: Intubated       Physical Examination :       Physical Exam  Constitutional:       General: She is not in acute distress. Appearance: Normal appearance. She is ill-appearing. She is not toxic-appearing. HENT:      Head: Normocephalic and atraumatic. Nose: Nose normal.      Mouth/Throat:      Mouth: Mucous membranes are moist.   Eyes:      Conjunctiva/sclera: Conjunctivae normal.      Pupils: Pupils are equal, round, and reactive to light. Neck:      Musculoskeletal: No muscular tenderness. Cardiovascular:      Rate and Rhythm: Normal rate and regular rhythm. Heart sounds: Normal heart sounds. No murmur. Pulmonary:      Effort: No respiratory distress. Breath sounds: Normal breath sounds. Abdominal:      General: There is no distension. Palpations: Abdomen is soft. Comments: Left UQ  drain w pus   Genitourinary:     Comments: Urina cristiano  Musculoskeletal:         General: No swelling. Right lower leg: No edema. Left lower leg: No edema. Skin:     Coloration: Skin is not jaundiced or pale. Neurological:      General: No focal deficit present. Mental Status: She is alert. Psychiatric:      Comments: Appropriate responsive on the vent         Past Medical History:     Past Medical History:   Diagnosis Date    Anemia     Bleeding 10/2020    intra-abdominal bleeding -due to splenic mass with GI infiltration. Status post embolization    Cervical cancer ([address]     Depression     Diabetes mellitus ([address]     GERD (gastroesophageal reflux disease)     Metastatic cancer ([address] 10/2020    extensive intraabdominal and splenic involvement and lung mets.     Ovarian cancer ([address] low grade serous ovarian carcinoma    Post chemo evaluation     2007: Chemo via med onc (Dr. Neo Stuart), 2008: Diana Gaston due to rising CA-125, 2013: intraperitoneal chemo,12/2015: Ca125 - 25     Splenic lesion        Past Surgical  History:     Past Surgical History:   Procedure Laterality Date    ABSCESS DRAINAGE  2013    Franca rectal    ANUS SURGERY      ANAL FISSURECTOMY    COLECTOMY  03/2013    ex lap, tumor debulking, transverse colectomy w reanastamosis, subgastric omentectomy, intraperitoneal port placement    HYSTERECTOMY, TOTAL ABDOMINAL      IR EMBOLIZATION HEMORRHAGE  10/05/2020    intra-abdominal bleeding -due to splenic mass with GI infiltration. Status post embolization [employer] interlock coils x7. mri condtional 3t ok, safe immediately post implant.     IR PORT PLACEMENT EQUAL OR GREATER THAN 5 YEARS  8/24/2020    IR PORT PLACEMENT EQUAL OR GREATER THAN 5 YEARS 8/24/2020 Ngozi Brown MD [facility]    PORT SURGERY      IP Port    TONSILLECTOMY         Medications:      calcium gluconate-NaCl  1 g Intravenous Once    enoxaparin  40 mg Subcutaneous Daily    potassium bicarb-citric acid  40 mEq Oral Daily    meropenem  1 g Intravenous Q8H    sodium chloride flush  10 mL Intravenous 2 times per day    ferrous sulfate  325 mg Oral Daily with breakfast    sertraline  50 mg Oral Daily    atorvastatin  20 mg Oral Daily    levetiracetam  1,500 mg Intravenous Q12H    pantoprazole  40 mg Intravenous BID       Social History:     Social History     Socioeconomic History    Marital status: Single     Spouse name: Not on file    Number of children: Not on file    Years of education: Not on file    Highest education level: Not on file   Occupational History    Not on file   Social Needs    Financial resource strain: Not on file    Food insecurity     Worry: Not on file     Inability: Not on file    Transportation needs     Medical: Not on file CREATININE 0.31 01/06/2021    GLUCOSE 104 01/06/2021       Detailed results: Thank you for allowing us to participate in the care of this patient. Please call with questions. This note is created with the assistance of a speech recognition program.  While intending to generate adocument that actually reflects the content of the visit, the document can still have some errors including those of syntax and sound a like substitutions which may escape proof reading. It such instances, actual meaningcan be extrapolated by contextual diversion.     Saeed Santana MD  Office: [phone]  Perfect serve / office [phone]

## 2021-01-06 NOTE — PROGRESS NOTES
[facility]   Progress Note                   Date:   1/6/2021  Patient name: Rex Mott [facility]  Date of admission:  1/4/2021  4:09 PM  MRN:   [id number]  YOB: 1965  PCP: No primary care provider on file. Reason for Admission: Status epilepticus ([address] [G40.901]    Subjective:       Clinical Changes / Abnormalities: Pt seen and examined in the room. Pt remains intubated. No CV issues overnight. Medications:   Scheduled Meds:   calcium gluconate-NaCl  1 g Intravenous Once    enoxaparin  40 mg Subcutaneous Daily    potassium bicarb-citric acid  40 mEq Oral Daily    meropenem  1 g Intravenous Q8H    sodium chloride flush  10 mL Intravenous 2 times per day    ferrous sulfate  325 mg Oral Daily with breakfast    sertraline  50 mg Oral Daily    atorvastatin  20 mg Oral Daily    levetiracetam  1,500 mg Intravenous Q12H    pantoprazole  40 mg Intravenous BID     Continuous Infusions:   propofol 35 mcg/kg/min (01/06/21 1108)    sodium chloride 100 mL/hr at 01/06/21 0750     CBC:   Recent Labs     01/04/21  1635 01/05/21  0526 01/06/21  0408   WBC 31.4* 19.9* 14.9*   HGB 10.7* 10.7* 10.1*   * 952* 812*     BMP:    Recent Labs     01/04/21  1225 01/05/21  0342 01/06/21  0408    132* 143   K 3.5* 3.1* 3.3*    96* 108*   CO2 20 22 25   BUN 5* 2* 3*   CREATININE 0.50 0.21* 0.31*   GLUCOSE 361* 118* 104*     Hepatic:   Recent Labs     01/04/21  1225 01/05/21  0342 01/06/21  0408   AST 24 24 17   ALT <5* 6 5   BILITOT 0.20* 0.18* <0.10*   ALKPHOS 93 87 82     Troponin:   Recent Labs     01/04/21  2127 01/04/21  2315 01/05/21  0526   TROPHS 161* 159* 169*     BNP: No results for input(s): BNP in the last 72 hours. Lipids: No results for input(s): CHOL, HDL in the last 72 hours. Invalid input(s): LDLCALCU  INR: No results for input(s): INR in the last 72 hours.     Objective: Vitals: /72   Pulse 87   Temp 98.1 °F (36.7 °C) (Axillary)   Resp 17   Ht 5' 6\" (1.676 m)   Wt 165 lb (74.8 kg)   SpO2 99%   BMI 26.63 kg/m²   General appearance: intubated and sedated   HEENT: Head: Normocephalic, no lesions, without obvious abnormality. Neck: no JVD, trachea midline, no adenopathy  Lungs: Clear to auscultation, on vent   Heart: Regular rate and rhythm, s1/s2 auscultated, no murmurs  Abdomen: soft, non-tender, bowel sounds active  Extremities: no edema  Neurologic: not done    EKG:    Date: 01/05/21  Reading: This tachycardia, inverted P waves in V1     LAST ECHO:  Date: 8/20  Findings Summary: Normal LV size, normal systolic function. LVEF 60 to 65%. No regional wall motion abnormalities. Mild concentric LVH. No valvular regurgitation or stenosis. Assessment / Acute Cardiac Problems:   1. NSTEMI type II MI   2. New onset seizure  3. Stage 4 ovarian CA     Patient Active Problem List:     Malignant neoplasm of ovary ([address]     Seizure ([address]     Type 2 diabetes mellitus without complication, without long-term current use of insulin (HCC)     Anemia     Splenic lesion     Ovarian cancer ([address]     Acute encephalopathy     PRES (posterior reversible encephalopathy syndrome)     Hemianopia, bitemporal     Encephalopathy     Status epilepticus ([address]     History of malignant neoplasm of ovary      Plan of Treatment:   1. NSTEMI, type II MI, demand ischemia. Trops flat. 2. Await ECHO results.     3. Keep k> 4 and Mag > 2     Electronically signed by ESTRELLITA Sarmiento CNP on 1/6/2021 at 12:00 PM  [id number] [place] [address] Rd.  [phone]

## 2021-01-06 NOTE — PROGRESS NOTES
Progress Note               Date:                           1/6/2021  Patient name:           Mary Garcia [facility]  Date of admission:  1/4/2021  4:09 PM  MRN:   [id number]  YOB: 1965  PCP:                           No primary care provider on file. Subjective:   Patient seen and chart reviewed. She is intubated and sedated. She is tachycardic at the time of evaluation. Vent settings are minimum. Her urine output is good. Her son is at the bedside. Case discussed with him in detail. He was updated regarding the findings of CT chest abdomen pelvis. He was tearful after receiving the update and the prognosis of his mother's condition. However he voiced complete understanding of the same. He wants to make all possible efforts for therapeutic management. It was informed to to him that the current goal of treatment is to keep his mother medically stable. Once the acute issues are  resolved then further therapeutic/palliative management can be considered. HPI in Brief:   The patient is a 60 y.o.  female who is admitted to the hospital for uncontrolled seizures. She is intubated. Seizures subsided. Pt is known to us with metastatic ovarian cancer on Doxil and avastin (recently held due to Gi bleeding, chemo was started 9/2020   She has been in a nd out of the hospital for recurrent seizures. brain imaging showed no mets  ( done at [facility]?)   Pt is intubated in ER.  No bleeding now LTME in place, no current seizures      Past Medical History:   has a past medical history of Anemia, Bleeding, Cervical cancer ([address] Depression, Diabetes mellitus ([address] GERD (gastroesophageal reflux disease), Metastatic cancer ([address] Ovarian cancer ([address] Post chemo evaluation, and Splenic lesion.    Past Surgical History:   has a past surgical history that includes Hysterectomy, total abdominal; Port Surgery; Tonsillectomy; IR PORT PLACEMENT > 5 YEARS (8/24/2020); Anus surgery; Abscess Drainage (2013); colectomy (03/2013); and IR EMBOLIZATION HEMORRHAGE (10/05/2020). Review of systems cannot be obtained as the patient is intubated and sedated. Medications:   Reviewed in Epic     Objective:   Vitals: /76   Pulse 92   Temp 98.8 °F (37.1 °C) (Oral)   Resp 18   Ht 5' 6\" (1.676 m)   Wt 165 lb (74.8 kg)   SpO2 98%   BMI 26.63 kg/m²       General appearance - intubated and sedated. Eyes - pupils equal and reactive,  Ears - bilateral TM's and external ear canals normal  Mouth - mucous membranes moist, ETT in place   Neck - supple, no significant adenopathy  Chest - scattered rhonchi   Heart - normal rate, regular rhythm, normal S1, S2,   Abdomen - soft, nontender, nondistended, no masses or organomegaly  Neurological - sedated and intubated. Extremities - peripheral pulses normal, no pedal edema, no clubbing or cyanosis  Skin - pale, no bleeding. Data:  No intake/output data recorded. In: 9889 [I.V.:1472; NG/GT:150]  Out: 412 [Urine:402; Drains:10]  CBC:   Recent Labs     01/04/21  1635 01/05/21  0526 01/06/21  0408   WBC 31.4* 19.9* 14.9*   HGB 10.7* 10.7* 10.1*   * 952* 812*     BMP:    Recent Labs     01/04/21  1225 01/05/21  0342 01/06/21  0408    132* 143   K 3.5* 3.1* 3.3*    96* 108*   CO2 20 22 25   BUN 5* 2* 3*   CREATININE 0.50 0.21* 0.31*   GLUCOSE 361* 118* 104*     Hepatic:   Recent Labs     01/04/21  1225 01/05/21  0342 01/06/21  0408   AST 24 24 17   ALT <5* 6 5   BILITOT 0.20* 0.18* <0.10*   ALKPHOS 93 87 82     INR: No results for input(s): INR in the last 72 hours.     IMAGING DATA:    Problem Lists:   Primary Problem:  <principal problem not specified>   Current Problems:  Active Hospital Problems    Diagnosis Date Noted  History of malignant neoplasm of ovary [Z85.43]     Status epilepticus ([address] [G40.901] 01/04/2021    Seizure ([address] [R56.9] 10/21/2020     PMH:  Past Medical History:   Diagnosis Date    Anemia     Bleeding 10/2020    intra-abdominal bleeding -due to splenic mass with GI infiltration. Status post embolization    Cervical cancer ([address]     Depression     Diabetes mellitus ([address]     GERD (gastroesophageal reflux disease)     Metastatic cancer ([address] 10/2020    extensive intraabdominal and splenic involvement and lung mets.  Ovarian cancer ([address]     low grade serous ovarian carcinoma    Post chemo evaluation     2007: Chemo via med onc (Dr. Maria G Vallejo), 2008: Hortensia Franc due to rising CA-125, 2013: intraperitoneal chemo,12/2015: Ca125 - 25     Splenic lesion       Allergies: Allergies   Allergen Reactions    Ceftriaxone      Had a rash on ceftriaxone December 2020, [facility]      Assessment    1. Metastatic ovarian cancer. 2. Stage IV disease with diffuse metastasis per CT. 3. Recurrent seizures. 4. Acute respiratory failure secondary to status epilepticus likely from metastasis from underlying malignancy. 5. Exudative pleural effusion. Plan     1. Supportive care. Agree with IV antibiotic therapy. 2. She continues to be on LTME per neurology and has moderate encephalopathy. Agree with neurology work up. 3. Stage IV disease with guarded prognosis. Further work-up/therapeutic management only when patient is more stable. 4. Goal of care would likely be palliative. However we will respect patient and family's wishes for any treatment keeping risks versus benefits in perspective. Discussion ongoing with the son. 5. We will follow. Kiara Cramer M.D.   Internal Medicine Resident , PGY-3  [address] [place] [address]  01/06/21 4:15 PM      Attending Physician Statement The patient was seen and examined during rounds, I have discussed the care of Karl Baca, including pertinent history and exam findings with the resident. I have reviewed the key elements of all parts of the encounter with the resident. I agree with the assessment, and status of the problem list as documented. Additional assessment/ plan  The patient is seen and evaluated. Long discussion with the son, I discussed the progression of her cancer. Case was discussed with neuro ICU, plan for extubation and supportive care. We will discuss  Future treatment if any after recovery from the acute illness.       Tabatha Beck MD  Hematology Oncology  [phone]  Electronically signed by Lupe López MD on 1/6/2021 at 7:15 PM

## 2021-01-06 NOTE — PLAN OF CARE
DATE: 01/06/21    AWAKE & FOLLOWING COMMANDS:  [] No   [x] Yes    INTUBATED:   [] No   [x] Yes    SEDATION/ANALGESIA:    [x] Propofol gtt  [] Precedex gtt [] Versed gtt   [] No Sedation or Not Applicable  Pain medications: N/A    VASOPRESSORS:  [x] No    [] Yes  [] Levophed [] Dopamine [] Vasopressin  [] Dobutamine [] Phenylephrine [] Epinephrine    CENTRAL LINES:  [x] No    [] Yes:     BROWNLEE CATHETER: [] No    [x] Yes:  Date placed: 1/4/20, Indication: N/A, plan to remove today post extubation    FEEDING: Able to take PO?   [x] No:  [x] NG/OG [] PEG  [x] NPO for: possible extubation  [] Tube Feeds    [] Yes:  Diet:     DVT Prophylaxis:  [x] Lovenox   [] Heparin subQ   [] Anticoagulation   [] Contraindicated secondary to:     Stress Ulcer Prophylaxis:  [x] PPI  [] H2 Receptor Blocker  [] Not indicated    Blood Glucose:  [x] Blood glucose <180 consistently  [] Insulin sliding scale   [] Home Medications addressed    HOB >30 Degrees:  [x] Yes  [] No, contraindicated due to: N/A    ESTRELLITA Amin - CNP

## 2021-01-06 NOTE — FLOWSHEET NOTE
Interaction took place with son outside of patient's room       Assessment: Welcomed by son initially, but preferred we spoke away from mother. Son and mother were approachable. Mother smiled each encounter with . Son, was very tearful, but able to express his feelings and concerns. Intervention:  provided active listening and sustained a presence of ministry while son expressed his concerns around his mother's initial diagnosis/illness, his own health carrion and his desire for her to be saved, when ready.  inquired of patient's bernardo background, patient does not have a particular Yazidi, but does believe in God (son is a [place]). Prayer desired by son,  prayed for mother's condition and overall trust in God, as well as for son and family. Inquired of support system, two daughters, only one local.  provided support and validated son's feelings as son stated \" I don't want to cry in front of her\". Outcome:Son expressed appreciation for prayer and support.  showed son to spiritual care office and reassured him of support available. Son appeared calm and less tearful. Son engaged as he shared future plans and trust in God. Plan: Patient, son and nurse have been made aware, the spiritual care and emotional support that is needed from chaplains is readily available. 01/06/21 1502   Encounter Summary   Services provided to: Patient; Family   Referral/Consult From: Family   Support System Children   Continue Visiting   (1/6/2021)   Complexity of Encounter High   Length of Encounter 30 minutes   Spiritual Assessment Completed Yes   Routine   Type Initial   Assessment Tearful;Spiritual struggle; Approachable   Intervention Active listening;Sustaining presence/ Ministry of presence;Prayer;Nurtured hope;Discussed belief system/Restoration practices/bernardo;Discussed illness/injury and it's impact Outcome Expressed gratitude;Engaged in conversation;Comfort;Expressed feelings/needs/concerns

## 2021-01-07 ENCOUNTER — APPOINTMENT (OUTPATIENT)
Dept: GENERAL RADIOLOGY | Age: 56
DRG: 871 | End: 2021-01-07
Payer: COMMERCIAL

## 2021-01-07 LAB
ABSOLUTE EOS #: 0.43 K/UL (ref 0–0.4)
ABSOLUTE IMMATURE GRANULOCYTE: 0 K/UL (ref 0–0.3)
ABSOLUTE LYMPH #: 2 K/UL (ref 1–4.8)
ABSOLUTE MONO #: 1 K/UL (ref 0.1–0.8)
ALBUMIN SERPL-MCNC: 2.9 G/DL (ref 3.5–5.2)
ALBUMIN/GLOBULIN RATIO: 0.9 (ref 1–2.5)
ALP BLD-CCNC: 83 U/L (ref 35–104)
ALT SERPL-CCNC: 5 U/L (ref 5–33)
ANION GAP SERPL CALCULATED.3IONS-SCNC: 12 MMOL/L (ref 9–17)
AST SERPL-CCNC: 12 U/L
BASOPHILS # BLD: 0 % (ref 0–2)
BASOPHILS ABSOLUTE: 0 K/UL (ref 0–0.2)
BILIRUB SERPL-MCNC: 0.25 MG/DL (ref 0.3–1.2)
BILIRUBIN DIRECT: 0.09 MG/DL
BILIRUBIN, INDIRECT: 0.16 MG/DL (ref 0–1)
BUN BLDV-MCNC: 4 MG/DL (ref 6–20)
BUN/CREAT BLD: ABNORMAL (ref 9–20)
CALCIUM IONIZED: 1.1 MMOL/L (ref 1.13–1.33)
CALCIUM SERPL-MCNC: 8.3 MG/DL (ref 8.6–10.4)
CHLORIDE BLD-SCNC: 107 MMOL/L (ref 98–107)
CO2: 24 MMOL/L (ref 20–31)
CREAT SERPL-MCNC: 0.31 MG/DL (ref 0.5–0.9)
CULTURE: ABNORMAL
DIFFERENTIAL TYPE: ABNORMAL
DIRECT EXAM: ABNORMAL
DIRECT EXAM: ABNORMAL
EOSINOPHILS RELATIVE PERCENT: 3 % (ref 1–4)
GFR AFRICAN AMERICAN: >60 ML/MIN
GFR NON-AFRICAN AMERICAN: >60 ML/MIN
GFR SERPL CREATININE-BSD FRML MDRD: ABNORMAL ML/MIN/{1.73_M2}
GFR SERPL CREATININE-BSD FRML MDRD: ABNORMAL ML/MIN/{1.73_M2}
GLOBULIN: ABNORMAL G/DL (ref 1.5–3.8)
GLUCOSE BLD-MCNC: 94 MG/DL (ref 70–99)
HCT VFR BLD CALC: 36.2 % (ref 36.3–47.1)
HEMOGLOBIN: 10.3 G/DL (ref 11.9–15.1)
IMMATURE GRANULOCYTES: 0 %
LYMPHOCYTES # BLD: 14 % (ref 24–44)
Lab: ABNORMAL
MAGNESIUM: 1.7 MG/DL (ref 1.6–2.6)
MCH RBC QN AUTO: 23 PG (ref 25.2–33.5)
MCHC RBC AUTO-ENTMCNC: 28.5 G/DL (ref 28.4–34.8)
MCV RBC AUTO: 81 FL (ref 82.6–102.9)
MONOCYTES # BLD: 7 % (ref 1–7)
MORPHOLOGY: ABNORMAL
NRBC AUTOMATED: 0 PER 100 WBC
PARTIAL THROMBOPLASTIN TIME: 24.5 SEC (ref 20.5–30.5)
PARTIAL THROMBOPLASTIN TIME: 25.5 SEC (ref 20.5–30.5)
PDW BLD-RTO: 25.4 % (ref 11.8–14.4)
PLATELET # BLD: 768 K/UL (ref 138–453)
PLATELET ESTIMATE: ABNORMAL
PMV BLD AUTO: 8.3 FL (ref 8.1–13.5)
POTASSIUM SERPL-SCNC: 3.3 MMOL/L (ref 3.7–5.3)
RBC # BLD: 4.47 M/UL (ref 3.95–5.11)
RBC # BLD: ABNORMAL 10*6/UL
SEG NEUTROPHILS: 76 % (ref 36–66)
SEGMENTED NEUTROPHILS ABSOLUTE COUNT: 10.87 K/UL (ref 1.8–7.7)
SODIUM BLD-SCNC: 143 MMOL/L (ref 135–144)
SPECIMEN DESCRIPTION: ABNORMAL
TOTAL PROTEIN: 6 G/DL (ref 6.4–8.3)
WBC # BLD: 14.3 K/UL (ref 3.5–11.3)
WBC # BLD: ABNORMAL 10*3/UL

## 2021-01-07 PROCEDURE — 6360000002 HC RX W HCPCS: Performed by: FAMILY MEDICINE

## 2021-01-07 PROCEDURE — 36415 COLL VENOUS BLD VENIPUNCTURE: CPT

## 2021-01-07 PROCEDURE — 85025 COMPLETE CBC W/AUTO DIFF WBC: CPT

## 2021-01-07 PROCEDURE — 71045 X-RAY EXAM CHEST 1 VIEW: CPT

## 2021-01-07 PROCEDURE — 99223 1ST HOSP IP/OBS HIGH 75: CPT | Performed by: INTERNAL MEDICINE

## 2021-01-07 PROCEDURE — 6370000000 HC RX 637 (ALT 250 FOR IP): Performed by: NURSE PRACTITIONER

## 2021-01-07 PROCEDURE — 80048 BASIC METABOLIC PNL TOTAL CA: CPT

## 2021-01-07 PROCEDURE — 80076 HEPATIC FUNCTION PANEL: CPT

## 2021-01-07 PROCEDURE — 2580000003 HC RX 258: Performed by: STUDENT IN AN ORGANIZED HEALTH CARE EDUCATION/TRAINING PROGRAM

## 2021-01-07 PROCEDURE — 6360000002 HC RX W HCPCS: Performed by: NURSE PRACTITIONER

## 2021-01-07 PROCEDURE — C9113 INJ PANTOPRAZOLE SODIUM, VIA: HCPCS | Performed by: STUDENT IN AN ORGANIZED HEALTH CARE EDUCATION/TRAINING PROGRAM

## 2021-01-07 PROCEDURE — 94761 N-INVAS EAR/PLS OXIMETRY MLT: CPT

## 2021-01-07 PROCEDURE — 6370000000 HC RX 637 (ALT 250 FOR IP): Performed by: STUDENT IN AN ORGANIZED HEALTH CARE EDUCATION/TRAINING PROGRAM

## 2021-01-07 PROCEDURE — 6370000000 HC RX 637 (ALT 250 FOR IP): Performed by: FAMILY MEDICINE

## 2021-01-07 PROCEDURE — 85730 THROMBOPLASTIN TIME PARTIAL: CPT

## 2021-01-07 PROCEDURE — 2060000000 HC ICU INTERMEDIATE R&B

## 2021-01-07 PROCEDURE — 83735 ASSAY OF MAGNESIUM: CPT

## 2021-01-07 PROCEDURE — 2580000003 HC RX 258

## 2021-01-07 PROCEDURE — 6360000002 HC RX W HCPCS: Performed by: STUDENT IN AN ORGANIZED HEALTH CARE EDUCATION/TRAINING PROGRAM

## 2021-01-07 PROCEDURE — 2580000003 HC RX 258: Performed by: NURSE PRACTITIONER

## 2021-01-07 PROCEDURE — 6360000002 HC RX W HCPCS: Performed by: INTERNAL MEDICINE

## 2021-01-07 PROCEDURE — 82330 ASSAY OF CALCIUM: CPT

## 2021-01-07 PROCEDURE — 99233 SBSQ HOSP IP/OBS HIGH 50: CPT | Performed by: PSYCHIATRY & NEUROLOGY

## 2021-01-07 PROCEDURE — 2580000003 HC RX 258: Performed by: INTERNAL MEDICINE

## 2021-01-07 PROCEDURE — 99232 SBSQ HOSP IP/OBS MODERATE 35: CPT | Performed by: INTERNAL MEDICINE

## 2021-01-07 RX ORDER — CALCIUM GLUCONATE 20 MG/ML
2 INJECTION, SOLUTION INTRAVENOUS ONCE
Status: COMPLETED | OUTPATIENT
Start: 2021-01-07 | End: 2021-01-07

## 2021-01-07 RX ORDER — SODIUM CHLORIDE 9 MG/ML
INJECTION INTRAVENOUS
Status: COMPLETED
Start: 2021-01-07 | End: 2021-01-07

## 2021-01-07 RX ORDER — PANTOPRAZOLE SODIUM 40 MG/1
40 TABLET, DELAYED RELEASE ORAL
Status: DISCONTINUED | OUTPATIENT
Start: 2021-01-07 | End: 2021-01-14 | Stop reason: HOSPADM

## 2021-01-07 RX ORDER — POTASSIUM CHLORIDE 7.45 MG/ML
10 INJECTION INTRAVENOUS
Status: COMPLETED | OUTPATIENT
Start: 2021-01-07 | End: 2021-01-07

## 2021-01-07 RX ORDER — 0.9 % SODIUM CHLORIDE 0.9 %
500 INTRAVENOUS SOLUTION INTRAVENOUS ONCE
Status: COMPLETED | OUTPATIENT
Start: 2021-01-07 | End: 2021-01-07

## 2021-01-07 RX ORDER — LOPERAMIDE HYDROCHLORIDE 2 MG/1
4 CAPSULE ORAL ONCE
Status: COMPLETED | OUTPATIENT
Start: 2021-01-07 | End: 2021-01-07

## 2021-01-07 RX ADMIN — POTASSIUM CHLORIDE 10 MEQ: 7.46 INJECTION, SOLUTION INTRAVENOUS at 14:33

## 2021-01-07 RX ADMIN — SODIUM CHLORIDE 10 ML: 9 INJECTION, SOLUTION INTRAMUSCULAR; INTRAVENOUS; SUBCUTANEOUS at 08:34

## 2021-01-07 RX ADMIN — POTASSIUM BICARBONATE 40 MEQ: 782 TABLET, EFFERVESCENT ORAL at 08:33

## 2021-01-07 RX ADMIN — PANTOPRAZOLE SODIUM 40 MG: 40 TABLET, DELAYED RELEASE ORAL at 15:46

## 2021-01-07 RX ADMIN — POTASSIUM CHLORIDE 10 MEQ: 7.46 INJECTION, SOLUTION INTRAVENOUS at 10:44

## 2021-01-07 RX ADMIN — MEROPENEM 1 G: 1 INJECTION, POWDER, FOR SOLUTION INTRAVENOUS at 08:50

## 2021-01-07 RX ADMIN — FERROUS SULFATE TAB EC 325 MG (65 MG FE EQUIVALENT) 325 MG: 325 (65 FE) TABLET DELAYED RESPONSE at 08:33

## 2021-01-07 RX ADMIN — LEVETIRACETAM 1500 MG: 15 INJECTION INTRAVENOUS at 21:18

## 2021-01-07 RX ADMIN — SODIUM CHLORIDE, PRESERVATIVE FREE 10 ML: 5 INJECTION INTRAVENOUS at 08:34

## 2021-01-07 RX ADMIN — PANTOPRAZOLE SODIUM 40 MG: 40 INJECTION, POWDER, LYOPHILIZED, FOR SOLUTION INTRAVENOUS at 08:33

## 2021-01-07 RX ADMIN — MEROPENEM 1 G: 1 INJECTION, POWDER, FOR SOLUTION INTRAVENOUS at 00:32

## 2021-01-07 RX ADMIN — SERTRALINE 50 MG: 50 TABLET, FILM COATED ORAL at 08:33

## 2021-01-07 RX ADMIN — ENOXAPARIN SODIUM 40 MG: 40 INJECTION SUBCUTANEOUS at 08:33

## 2021-01-07 RX ADMIN — ACETAMINOPHEN 650 MG: 325 TABLET ORAL at 21:18

## 2021-01-07 RX ADMIN — LEVETIRACETAM 1500 MG: 15 INJECTION INTRAVENOUS at 08:30

## 2021-01-07 RX ADMIN — LOPERAMIDE HYDROCHLORIDE 4 MG: 2 CAPSULE ORAL at 15:46

## 2021-01-07 RX ADMIN — MEROPENEM 1 G: 1 INJECTION, POWDER, FOR SOLUTION INTRAVENOUS at 15:46

## 2021-01-07 RX ADMIN — POTASSIUM CHLORIDE 10 MEQ: 7.46 INJECTION, SOLUTION INTRAVENOUS at 13:04

## 2021-01-07 RX ADMIN — POTASSIUM CHLORIDE 10 MEQ: 7.46 INJECTION, SOLUTION INTRAVENOUS at 11:59

## 2021-01-07 RX ADMIN — ATORVASTATIN CALCIUM 20 MG: 20 TABLET, FILM COATED ORAL at 08:33

## 2021-01-07 RX ADMIN — CALCIUM GLUCONATE 2 G: 20 INJECTION, SOLUTION INTRAVENOUS at 12:25

## 2021-01-07 RX ADMIN — SODIUM CHLORIDE 500 ML: 0.9 INJECTION, SOLUTION INTRAVENOUS at 01:42

## 2021-01-07 ASSESSMENT — PAIN SCALES - GENERAL: PAINLEVEL_OUTOF10: 6

## 2021-01-07 NOTE — PROGRESS NOTES
INR: No results for input(s): INR in the last 72 hours. Objective:   Vitals: BP (!) 128/52   Pulse 101   Temp 98.5 °F (36.9 °C) (Oral)   Resp 19   Ht 5' 6\" (1.676 m)   Wt 165 lb (74.8 kg)   SpO2 97%   BMI 26.63 kg/m²   General appearance: intubated and sedated   HEENT: Head: Normocephalic, no lesions, without obvious abnormality. Neck: no JVD, trachea midline, no adenopathy  Lungs: Clear to auscultation throughout, mild anterior rhonchi. No rales. NC oxygen    Heart: Regular rate and rhythm, s1/s2 auscultated, no murmurs. SR/ST  Abdomen: soft, non-tender, bowel sounds active  Extremities: no edema  Neurologic: not done    EKG:    Date: 01/05/21  Reading: This tachycardia, inverted P waves in V1     LAST ECHO:  Date: 8/20  Findings Summary: Normal LV size, normal systolic function. LVEF 60 to 65%. No regional wall motion abnormalities. Mild concentric LVH. No valvular regurgitation or stenosis. Echo 1/6/21  Summary  Left ventricle is normal in size. Global left ventricular systolic function is mild to moderately reduced. Estimated ejection fraction is 40 % . The apex and apical segments appear severely hypokinetic. The basal segments  appear to be functioning normal.  Abnormal global L. strain of -11 %. Mild left ventricular hypertrophy. Grade I (mild) left ventricular diastolic dysfunction. Mild aortic insufficiency. Trivial mitral regurgitation. Trivial tricuspid regurgitation. Small to mild circumferential pericardial effusion, with no  echocardiographic tamponade. Assessment / Acute Cardiac Problems:   1. NSTEMI    2. New onset seizure  3. Stage 4 ovarian CA   4. Hypokalemia  5. Hypomagnesium  6.  CMP with LVEF on recent echo down to 40% with significant WMA    Patient Active Problem List:     Malignant neoplasm of ovary (HCC)     Seizure ([address]     Type 2 diabetes mellitus without complication, without long-term current use of insulin (HCC)     Anemia     Splenic lesion

## 2021-01-07 NOTE — PROGRESS NOTES
Neuro critical care:     Metastatic ovarian cancer   Recent splenic embolization followed by splenic abscess and drain placed at [facility] last month   Purulent drainage -growing E coli, on meropenem and ID following , leucocytosis resolving   She came in with seizures. No seizures since admitted to neuro ICU, extubated 1/6/2021 and protecting airway well   Non focal exam, alert and conversing well   Mobilize out of bed and therapies today   Heme onc team following initial discussions with family/son were of phase 1 drug trial therapy versus hospice  NSTEMI-troponin elevation with echocardiogram finding of reduced EF to 40% and apical segmental hypokinesis/WMA, cardiology team following and have spoken with patient's son. Heparin can be reconsidered based on goals of care and heme-onc's further discussion with family  Step down status .  Transfer to medicine team   Kasie Quinonez MD

## 2021-01-07 NOTE — PROGRESS NOTES
Neuro Critical Care Sign Out to Internal Medicine      Date and time: 1/7/2021 2:47 PM  Patient's name:  eHctor Suggs Record Number: [id number]  Patient's account/billing number: [de-identified]  Patient's YOB: 1965  Age: 60 y.o. Date of Admission: 1/4/2021  4:09 PM  Length of stay during current admission: 3    Primary Care Physician: No primary care provider on file. Code Status: Full Code    Mode of physician to physician communication:        [x] Via telephone   [] In person     Date and time of sign-out: 1/7/2021 2:47 PM    Accepting Internal Medicine Resident: Dr. Eris Guerrero    Accepting Medicine Team: IM Team    Accepting Team's Attending: Dr. Eris Guerrero    Patient's current ICU Bed:  [id number]     Patient's assigned bed on floor:  TBA        [] Med-Surg Monitored [x] Step-down       [] Psychiatry ICU       [] Psych floor     Reason for ICU admission:     Seizure/SE    ICU course summary:     57-year-old female with history of diabetes, GERD, metastatic ovarian cancer who presented as a transfer from [facility] ER for status epilepticus. Patient was found altered by family and upon EMS arrival she was noted to have a witnessed seizure and was given 4 mg of Versed without cessation of seizures. There was an attempted nasal intubation which was unsuccessful and patient was transported to [facility] ED. Patient was intubated and started on propofol. Once propofol was started clinical seizure activity had resolved. Patient was noted to have marked leukocytosis and broad-spectrum antibiotics with Vanco and Zosyn were started. Patient was also given 5 L of LR for fluid resuscitation.     With regards to seizures, her initial diagnosis was in October 2020 where she was noted to have an MRI concerning for PRES.  she was discharged home on Keppra 1500 mg twice daily.    Patient also had a recent visit at [place] [address] where she will had a splenic abscess requiring splenic drain placed. Was noted to be on Invanz per records.     With regards to her ovarian cancer, she follows up with Dr. Alexandria Gray:   Patient arrived to [address] [place] [address] ED and was noted to be agitated and biting her ET tube. She is continued on propofol which was increased to 60 mics due to agitation. Patient was admitted to the neuro ICU. She was continued on broad-spectrum antibiotics. ID consult was obtained. Antibiotics were switched to meropenem. Preliminary culture with gram-negative rods. General surgery was consulted with no acute surgical intervention at this time. Cardiology consult was obtained for trop elevations. Initially started on heparin infusion then discontinued due to thought of elevation secondary to SE.  2D Echo revealed EF 40% and significant WMA. Cardiology following and recommended cath. Tolerated extubation and did well. Heme/onc on board for her metastatic ovarian ca. Doing well and stable for stepdown    Procedures during patient's ICU stay:         Current Vitals:     /72   Pulse 93   Temp 98.3 °F (36.8 °C) (Oral)   Resp 18   Ht 5' 6\" (1.676 m)   Wt 165 lb (74.8 kg)   SpO2 92%   BMI 26.63 kg/m²       Cultures:     Blood cultures:                 [] None drawn      [x] Negative             []  Positive (Details:  )  Urine Culture:                   [] None drawn      [] Negative             []  Positive (Details:  )  Sputum Culture:               [] None drawn       [] Negative             []  Positive (Details:  )   Endotracheal aspirate:     [] None drawn       [] Negative             []  Positive (Details:  )       Consults:     1. ID  2. Cardiology  3. Heme/onc  4.  Gen surg (signed off)    Assessment:     Patient Active Problem List    Diagnosis Date Noted    History of malignant neoplasm of ovary     Status epilepticus ([facility] 01/04/2021  Encephalopathy     PRES (posterior reversible encephalopathy syndrome)     Hemianopia, bitemporal     Type 2 diabetes mellitus without complication, without long-term current use of insulin (HCC)     Anemia     Splenic lesion     [facility]     Acute encephalopathy     Seizure ([facility] 10/21/2020    Malignant neoplasm of ovary ([facility] 08/17/2020       Additional assessment:    1. Sepsis secondary to splenic abscess decreasing seizure threshold    Recommended Follow-up:     1. Oncology follow up with regards to metastatic ovarian CA prognosis/tx  2. ID reccs with regards to splenic drain cx mgmt  3. Continue keppra 1500mg bid, stable from seizure standpoint  4. Follow up cardiology reccs for further work up of [address]        Above mentioned assessment and plan was discussed by me with the admitting medicine resident. The medicine team assigned to the patient by medicine admitting resident will be following up the patient from now onwards on the floor.      Brendon Diaz MD   Neurology PGY-2  Neuro Critical Care  1/7/2021, 2:47 PM

## 2021-01-07 NOTE — PLAN OF CARE
DATE: 01/07/21    AWAKE & FOLLOWING COMMANDS:  [] No   [x] Yes    INTUBATED:   [x] No   [] Yes    SEDATION/ANALGESIA:    [x] Propofol gtt  [] Precedex gtt [] Versed gtt   [] No Sedation or Not Applicable  Pain medications: N/A    VASOPRESSORS:  [x] No    [] Yes  [] Levophed [] Dopamine [] Vasopressin  [] Dobutamine [] Phenylephrine [] Epinephrine    CENTRAL LINES:  [x] No    [] Yes:     BROWNLEE CATHETER: [x] No    [] Yes:  Date placed: 1/4/20, Indication: N/A, Remove today    FEEDING: Able to take PO?  [] No:  [] NG/OG [] PEG  [] NPO for: possible extubation  [] Tube Feeds    [] Yes:  Diet: General Diet    DVT Prophylaxis:  [x] Lovenox   [] Heparin subQ   [] Anticoagulation   [] Contraindicated secondary to:     Stress Ulcer Prophylaxis:  [x] PPI  [] H2 Receptor Blocker  [] Not indicated    Blood Glucose:  [x] Blood glucose <180 consistently  [] Insulin sliding scale   [] Home Medications addressed    HOB >30 Degrees:  [x] Yes  [] No, contraindicated due to: N/A    ESTRELLITA Tellez - CNP

## 2021-01-07 NOTE — PLAN OF CARE
Problem: Coping:  Goal: Ability to cope will improve  Description: Ability to cope will improve  Outcome: Ongoing  Goal: Ability to identify appropriate support needs will improve  Description: Ability to identify appropriate support needs will improve  Outcome: Ongoing     Problem: Health Behavior:  Goal: Ability to manage health-related needs will improve  Description: Ability to manage health-related needs will improve  Outcome: Ongoing     Problem: Physical Regulation:  Goal: Signs of adequate cerebral perfusion will increase  Description: Signs of adequate cerebral perfusion will increase  Outcome: Ongoing  Goal: Ability to maintain a stable neurologic state will improve  Description: Ability to maintain a stable neurologic state will improve  Outcome: Ongoing     Problem: Safety:  Goal: Ability to remain free from injury will improve  Description: Ability to remain free from injury will improve  Outcome: Ongoing     Problem: Self-Concept:  Goal: Level of anxiety will decrease  Description: Level of anxiety will decrease  Outcome: Ongoing  Goal: Ability to verbalize feelings about condition will improve  Description: Ability to verbalize feelings about condition will improve  Outcome: Ongoing     Problem: Nutrition  Goal: Optimal nutrition therapy  Description: Nutrition Problem #1: Inadequate oral intake  Intervention: Food and/or Nutrient Delivery: Continue NPO  Nutritional Goals: Meet at least 75% of estimated nutrient needs     Outcome: Ongoing     Problem: Skin Integrity:  Goal: Will show no infection signs and symptoms  Description: Will show no infection signs and symptoms  1/7/2021 1718 by Berenice Lazar RN  Outcome: Ongoing  Goal: Absence of new skin breakdown  Description: Absence of new skin breakdown  1/7/2021 1718 by Berenice Lazar RN  Outcome: Ongoing  Problem: Falls - Risk of:  Goal: Will remain free from falls  Description: Will remain free from falls

## 2021-01-07 NOTE — PROGRESS NOTES
Comprehensive Nutrition Assessment    Type and Reason for Visit:  Initial, Consult(Automatic vent consult)    Nutrition Recommendations/Plan: Send chocolate Ensure Enlive ONS twice daily. Nutrition Assessment:  Pt extubated yesterday. Pt reports fair appetite and says she ate ~50% of breakfast. Reports slight decrease in appetite/PO intake x 1 month PTA. Pt agrees to try Ensure ONS. Malnutrition Assessment:  Malnutrition Status: Moderate malnutrition    Context:  Chronic Illness     Findings of the 6 clinical characteristics of malnutrition:  Energy Intake:  Mild decrease in energy intake (Comment)  Weight Loss:  7 - Greater than 7.5% over 3 months     Body Fat Loss:  1 - Mild body fat loss Triceps   Muscle Mass Loss:  Unable to assess    Fluid Accumulation:  1 - Mild Extremities   Strength:  Not Performed    Estimated Daily Nutrient Needs:  Energy (kcal):  25 kcal/kg = 2262-9611 kcals/day; Weight Used for Energy Requirements:  Admission  Protein (g):  1.3-1.5 gm/kg = 80-90 gm/day; Weight Used for Protein Requirements:  Ideal          Nutrition Related Findings:  Meds/labs reviewed      Wounds:  None(rednes on coccyx noted)       Current Nutrition Therapies:    DIET GENERAL; Anthropometric Measures:  · Height: 5' 6\" (167.6 cm)  · Current Body Weight: 165 lb 5.5 oz (75 kg)   · Admission Body Weight: 165 lb 5.5 oz (75 kg)    · Usual Body Weight: 187 lb 13.3 oz (85.2 kg)(10/5/20 per EHR (noted wt fluctuations between ~180-190 lbs))     · Ideal Body Weight: 130 lbs; % Ideal Body Weight 127.2 %   · BMI: 26.7  · BMI Categories: Overweight (BMI 25.0-29. 9)       Nutrition Diagnosis:   · Inadequate oral intake related to (appetite, current condition) as evidenced by (decreased PO intake PTA, hx of weight loss x 3 months)      Nutrition Interventions:   Food and/or Nutrient Delivery:  Continue Current Diet, Start Oral Nutrition Supplement  Nutrition Education/Counseling:  No recommendation at this time Coordination of Nutrition Care:  Continue to monitor while inpatient    Goals:  Meet at least 75% of estimated nutrient needs       Nutrition Monitoring and Evaluation:   Behavioral-Environmental Outcomes:  None Identified   Food/Nutrient Intake Outcomes:  Food and Nutrient Intake, Supplement Intake  Physical Signs/Symptoms Outcomes:  Weight, Biochemical Data, Nutrition Focused Physical Findings     Discharge Planning:     Too soon to determine     Electronically signed by Annamaria Ledesma MS, RD, LD on 1/7/21 at 12:04 PM EST    Contact: [phone]

## 2021-01-07 NOTE — H&P
Attending Physician Statement  I have discussed the case of sAaf Kennedy, including pertinent history and exam findings with the resident/fellow/medical student/NP/PA. I have seen and examined the patient and the key elements of the encounter have been performed by me. I agree with the assessment, plan and orders as documented by the resident/fellow/medical student/NP/PA  With changes made to the note as needed. Pt was seen during rounds. Review of Systems:   In addition to the pertinent positives and negatives as stated within HPI and the review of systems as documented in their notes, all other systems were reviewed when able to and are reported negative. Patient admitted to the internal medicine service as a transfer from neuro critical care  Patient primarily having seizures and admitted with status epilepticus. Patient was intubated and subsequently extubated. Patient also with diabetes and metastatic ovarian cancer which is progressive  Acute respiratory failure unspecified with hypoxemia and hypercarbia s/p extubation and currently needing 2 L oxygen by nasal cannula-continue supplemental oxygen    Status epilepticus. S/p extubation-continue neurology evaluation    Metastatic ovarian cancer, stage IV-continue supportive care. Patient with guarded prognosis per oncology. Likely will need palliative care. Oncology discussed with patient's son regarding level of care    Exudative pleural effusion, likely related to ovarian cancer metastasis-continue to monitor    Splenic abscess s/p drainage in December 2020-ID following patient    Type 2 diabetes mellitus-continue to monitor blood sugars and cover with insulin    Anemia secondary to malignancy and medications along with infection-continue to monitor.   Also check iron studies    Depression-resume home medications    Hypokalemia-supplement potassium    Leukocytosis, improving-continue to monitor Thrombocytosis, improving. Could partly be related to the infection process-continue to monitor    Chronic combined systolic and diastolic congestive heart failure-diuretics as needed.   If the blood pressure will tolerate, will consider starting ACE inhibitors    Small pericardial effusion-continue to monitor    On Lovenox for DVT prophylaxis  Daughter was updated        Pedro Luis Dahl  1/7/2021  4:11 PM

## 2021-01-07 NOTE — PROGRESS NOTES
Progress Note               Date:                           1/7/2021  Patient name:           Angelica Pfeiffer [facility]  Date of admission:  1/4/2021  4:09 PM  MRN:   [id number]  YOB: 1965  PCP:                           No primary care provider on file. Subjective:   Patient seen and chart reviewed. She has been extubated and tolerated it well. Currently on 2 L nasal cannula. Able to converse appropriately. Slightly depressed and tearful as she is aware of her medical condition. Her daughter is at her bedside today. Case discussed with them. They voiced understanding of the patient's medical condition. Will likely have further discussion regarding goals of care today. HPI in Brief:   The patient is a 60 y.o.  female who is admitted to the hospital for uncontrolled seizures. She is intubated. Seizures subsided. Pt is known to us with metastatic ovarian cancer on Doxil and avastin (recently held due to Gi bleeding, chemo was started 9/2020   She has been in a nd out of the hospital for recurrent seizures. brain imaging showed no mets  ( done at [facility]?)   Pt is intubated in ER. No bleeding now LTME in place, no current seizures      Past Medical History:   has a past medical history of Anemia, Bleeding, Cervical cancer ([address] Depression, Diabetes mellitus ([address] GERD (gastroesophageal reflux disease), Metastatic cancer ([address] Ovarian cancer ([address] Post chemo evaluation, and Splenic lesion.     Past Surgical History:   has a past surgical history that includes Hysterectomy, total abdominal; Port Surgery; Tonsillectomy; IR PORT PLACEMENT > 5 YEARS (8/24/2020); Anus surgery; Abscess Drainage (2013); colectomy (03/2013); and IR EMBOLIZATION HEMORRHAGE (10/05/2020). Review of systems cannot be obtained as the patient is intubated and sedated.        Medications:   Reviewed in Epic     Objective: Vitals: BP (!) 128/52   Pulse 101   Temp 98.5 °F (36.9 °C) (Oral)   Resp 19   Ht 5' 6\" (1.676 m)   Wt 165 lb (74.8 kg)   SpO2 97%   BMI 26.63 kg/m²       General appearance - intubated and sedated. Eyes - pupils equal and reactive,  Ears - bilateral TM's and external ear canals normal  Mouth - mucous membranes moist, ETT in place   Neck - supple, no significant adenopathy  Chest - scattered rhonchi   Heart - normal rate, regular rhythm, normal S1, S2,   Abdomen - soft, nontender, nondistended, no masses or organomegaly  Neurological - sedated and intubated. Extremities - peripheral pulses normal, no pedal edema, no clubbing or cyanosis  Skin - pale, no bleeding. Data:  I/O this shift:  In: 617 [P.O.:240; I.V.:377]  Out: 280 [Urine:280]  In: 3509 [P.O.:240; I.V.:3239; NG/GT:30]  Out: 4358 [Urine:1658; Drains:15]  CBC:   Recent Labs     01/05/21  0526 01/06/21  0408 01/07/21  0325   WBC 19.9* 14.9* 14.3*   HGB 10.7* 10.1* 10.3*   * 812* 768*     BMP:    Recent Labs     01/05/21  0342 01/06/21  0408 01/07/21  0325   * 143 143   K 3.1* 3.3* 3.3*   CL 96* 108* 107   CO2 22 25 24   BUN 2* 3* 4*   CREATININE 0.21* 0.31* 0.31*   GLUCOSE 118* 104* 94     Hepatic:   Recent Labs     01/05/21  0342 01/06/21  0408 01/07/21  0325   AST 24 17 12   ALT 6 5 5   BILITOT 0.18* <0.10* 0.25*   ALKPHOS 87 82 83     INR: No results for input(s): INR in the last 72 hours. IMAGING DATA:    Problem Lists:   Primary Problem:  <principal problem not specified>   Current Problems:  Active Hospital Problems    Diagnosis Date Noted    History of malignant neoplasm of ovary [Z85.43]     [facility] [G40.901] 01/04/2021    Seizure ([facility] [R56.9] 10/21/2020     PMH:  Past Medical History:   Diagnosis Date    Anemia     Bleeding 10/2020    intra-abdominal bleeding -due to splenic mass with GI infiltration.   Status post embolization    [facility]     Depression     [facility]  GERD (gastroesophageal reflux disease)     Metastatic cancer ([facility] 10/2020    extensive intraabdominal and splenic involvement and lung mets.  Ovarian cancer ([facility]     low grade serous ovarian carcinoma    Post chemo evaluation     2007: Chemo via med onc (Dr. Nirmal Garcia), 2008: Jelly Laci due to rising CA-125, 2013: intraperitoneal chemo,12/2015: Ca125 - 25     Splenic lesion       Allergies: Allergies   Allergen Reactions    Ceftriaxone      Had a rash on ceftriaxone December 2020, [facility]      Assessment    1. Metastatic ovarian cancer. 2. Stage IV disease with diffuse metastasis per CT. 3. Recurrent seizures. 4. Acute respiratory failure secondary to status epilepticus likely from metastasis from underlying malignancy. 5. Exudative pleural effusion. Plan     1. Supportive care. Agree with IV antibiotic therapy. 2. Stage IV disease with guarded prognosis. Will have goals of care discussion. 3. Goal of care would likely be palliative. However we will respect patient and family's wishes for any treatment keeping risks versus benefits in perspective. 4. We will follow. Mia Sanchez M.D. Internal Medicine Resident , PGY-3  [facility]  01/07/21 11:43 AM      Attending Physician Statement   I have discussed the care of [address] [place] [address], including pertinent history and exam findings with the resident. I have reviewed the key elements of all parts of the encounter with the resident. I have seen and examined the patient with the resident. I agree with the assessment and plan and status of the problem list as documented. Discussed with the patient and her family. Daughter at bedside and son on the phone. Clinically she is significantly better and seizures are controlled. Discussed further treatment for her ovarian cancer. Supportive care versus chemotherapy treatment. Patient and family are interested in more active treatment. This will be addressed further as outpatient based on her performance.                             [facility]

## 2021-01-07 NOTE — PROGRESS NOTES
Patient was admitted to the neuro ICU. She was continued on broad-spectrum antibiotics. ID consult was obtained. Antibiotics were switched to meropenem. Preliminary culture with gram-negative rods. General surgery was consulted with no acute surgical intervention at this time. 2021 - Doing better, tolerated PO intake. Following commands. Extubated successfully    Last 24h:   No acute events overnight. Had good UOP post lasix however required another bolus early this morning.       CURRENT MEDICATIONS:  SCHEDULED MEDICATIONS:   enoxaparin  40 mg Subcutaneous Daily    potassium bicarb-citric acid  40 mEq Oral Daily    meropenem  1 g Intravenous Q8H    sodium chloride flush  10 mL Intravenous 2 times per day    ferrous sulfate  325 mg Oral Daily with breakfast    sertraline  50 mg Oral Daily    atorvastatin  20 mg Oral Daily    levetiracetam  1,500 mg Intravenous Q12H    pantoprazole  40 mg Intravenous BID     CONTINUOUS INFUSIONS:   propofol Stopped (21 1300)    sodium chloride 100 mL/hr at 21 0750     PRN MEDICATIONS:   sodium chloride flush, sodium chloride flush, promethazine **OR** ondansetron, polyethylene glycol, acetaminophen **OR** acetaminophen, HYDROcodone-acetaminophen    VITALS:  Temperature Range: Temp: 98.7 °F (37.1 °C) Temp  Av.4 °F (36.9 °C)  Min: 97.6 °F (36.4 °C)  Max: 99 °F (37.2 °C)  BP Range: Systolic (90IZJ), MEF:608 , Min:98 , ETR:598     Diastolic (91TUK), DUV:35, Min:63, Max:81    Pulse Range: Pulse  Av.6  Min: 81  Max: 116  Respiration Range: Resp  Av.2  Min: 13  Max: 25  Current Pulse Ox: SpO2: 98 %  24HR Pulse Ox Range: SpO2  Av.2 %  Min: 92 %  Max: 100 %  Patient Vitals for the past 12 hrs:   BP Temp Pulse Resp SpO2   21 0600 132/81  86 15 98 %   21 0500 115/68  90 18 99 %   21 0420 123/67  87 22 100 %   21 0400  98.7 °F (37.1 °C) 84 17 100 %   21 0300 123/66  85 15 97 % 01/07/21 0200 126/73  97 19 97 %   01/07/21 0100 120/69  90 25 98 %   01/07/21 0000 115/66 99 °F (37.2 °C) 87 14 99 %   01/06/21 2300 123/73  86 17 98 %   01/06/21 2200   86 17 99 %   01/06/21 2100   92 17 99 %   01/06/21 2000 123/63 98.5 °F (36.9 °C) 110 22 98 %   01/06/21 1901 120/68  116 17 92 %     Estimated body mass index is 26.63 kg/m² as calculated from the following:    Height as of this encounter: 5' 6\" (1.676 m). Weight as of this encounter: 165 lb (74.8 kg).  []<16 Severe malnutrition  []1616.99 Moderate malnutrition  []1718.49 Mild malnutrition  []18.524.9 Normal  [x]2529.9 Overweight (not obese)  []3034.9 Obese class 1 (Low Risk)  []3539.9 Obese class 2 (Moderate Risk)  []?40 Obese class 3 (High Risk)    RECENT LABS:   Lab Results   Component Value Date    WBC 14.3 (H) 01/07/2021    HGB 10.3 (L) 01/07/2021    HCT 36.2 (L) 01/07/2021     (H) 01/07/2021    ALT 5 01/07/2021    AST 12 01/07/2021     01/07/2021    K 3.3 (L) 01/07/2021     01/07/2021    CREATININE 0.31 (L) 01/07/2021    BUN 4 (L) 01/07/2021    CO2 24 01/07/2021    TSH 0.93 10/22/2020    INR 1.0 10/23/2020     24 HOUR INTAKE/OUTPUT:    Intake/Output Summary (Last 24 hours) at 1/7/2021 0637  Last data filed at 1/7/2021 0600  Gross per 24 hour   Intake 3549 ml   Output 1525 ml   Net 2024 ml       IMAGING:   XR CHEST PORTABLE   Final Result   Overall stable examination. Cardiomegaly and pulmonary vascular congestion. Small left pleural effusion versus pleural scarring. CT CHEST ABDOMEN PELVIS W CONTRAST   Final Result   Interval placement of a percutaneous pigtail catheter left splenic complex   mass. Mesenteric, retroperitoneal, and left inguinal pathologic lymphadenopathy   consistent with metastatic disease. Multiple pulmonary nodules consistent with metastatic disease. Prominent   right hilar lymph node. New left lower lobe consolidation and effusion. Diffuse bony blastic metastatic disease. MRI BRAIN W WO CONTRAST   Final Result   1. No acute intracranial abnormality. Specifically, no acute infarction,   mesial temporal sclerosis, or intracranial metastatic disease. 2. Mild parenchymal volume loss. Sequela of mild chronic microvascular   ischemic changes. XR CHEST PORTABLE   Final Result   Stable chest      Stable support lines      Possible mild edema with moderate left perihilar, left basilar opacity   related to combined pleuroparenchymal process         XR ABDOMEN FOR NG/OG/NE TUBE PLACEMENT   Final Result   Enteric tube in the stomach. Other incidental findings as above. XR CHEST PORTABLE   Final Result   ET tube as above. Life support apparatus otherwise stable. Mild-to-moderate   edema and small left effusion unchanged. XR CHEST PORTABLE    (Results Pending)   XR CHEST PORTABLE    (Results Pending)         Labs and Images reviewed with:  [x] Dr. Rachele Larson. [place]    [] Dr. Migel Jules  [] Dr. Praveen Pérez  [] There are no new interval images to review. PHYSICAL EXAM       CONSTITUTIONAL:  Well developed, well nourished, alert and oriented x 3, in no acute distress. GCS 15. Nontoxic. No dysarthria. No aphasia.    HEAD:  normocephalic, atraumatic    EYES:  PERRLA, EOMI.   ENT:  moist mucous membranes   NECK:  supple, symmetric   LUNGS:  Equal air entry bilaterally   CARDIOVASCULAR:  normal s1 / s2, RRR, distal pulses intact   ABDOMEN:  Soft, no rigidity   NEUROLOGIC:  Mental Status:  A & O x3,awake             Cranial Nerves:    cranial nerves II-XII are grossly intact    Motor Exam:    Drift:  absent  Tone:  normal    Motor exam is symmetrical 5 out of 5 all extremities bilaterally    Sensory:    Touch:    Right Upper Extremity:  normal  Left Upper Extremity:  normal  Right Lower Extremity:  normal  Left Lower Extremity:  normal    Deep Tendon Reflexes:    Right Bicep:  2+  Left Bicep:  2+  Right Knee:  2+ Left Knee:  2+    Plantar Response:  Right:  downgoing  Left:  downgoing    Clonus:  N/A  Spear's:  N/A    Coordination/Dysmetria:  Finger to Nose:   Right:  normal  Left:  normal       Gait:  Not tested     DRAINS:  [] There are no drains for Neuro Critical Care to monitor at this time. ASSESSMENT AND PLAN:       43-year-old female with history of diabetes, seizures, metastatic ovarian cancer presented as a transfer from [facility] ED for status epilepticus. Also noted to septic secondary to splenic abscess causing seizure threshold to lower. NEUROLOGIC:  - Imaging: MRI Brain with no acute finding or evidence of metastatic disease  - AEDs Keppra 1500mg BID  - Goal SBP <160  - Neuro checks per protocol    CARDIOVASCULAR:  - Goal SBP <160  - Continue telemetry  - 2D Echo 1/6/2021 with EF 40% with significant WMA. Cardiology on board. PULMONARY:  - CXR stable, small left pleural effusion  - on RA  - IS    RENAL/FLUID/ELECTROLYTE:  - BUN 4/ Creatinine 0.31  - Urine output 370 last shift  - IVF: increased to 100cc/hr  - Replace electrolytes PRN  - Daily BMP    GI/NUTRITION:  NUTRITION:  DIET GENERAL;  - Bowel regimen: glycolax prn  - GI prophylaxis: PPI    ID:  - Tmax 99  - WBC 14.3 <- 14.9 <- 19.9  - on Meropenem  - ID on board  - Infectious work up in process  - Continue to monitor for fevers  - Daily CBC    HEME:   - H&H 10.3/36.2  - Platelets 115  - Daily CBC  - Heme/onc on board    ENDOCRINE:  - Continue to monitor blood glucose, goal <180    OTHER:  - PT/OT/ST   - Code Status: Full    PROPHYLAXIS:  Stress ulcer: PPI    DVT PROPHYLAXIS:  - SCD sleeves - Thigh High   - Lovenox    DISPOSITION:  [] To remain ICU:  [x] OK for out of ICU from Neuro Critical Care standpoint to stepdown    We will continue to follow along. For any changes in exam or patient status please contact Neuro Critical Care.       Eden Black MD   Neurology PGY-2  Neuro Critical Care  1/7/2021     6:37 AM

## 2021-01-08 ENCOUNTER — APPOINTMENT (OUTPATIENT)
Dept: GENERAL RADIOLOGY | Age: 56
DRG: 871 | End: 2021-01-08
Payer: COMMERCIAL

## 2021-01-08 LAB
ABSOLUTE EOS #: 0.25 K/UL (ref 0–0.44)
ABSOLUTE IMMATURE GRANULOCYTE: 0.13 K/UL (ref 0–0.3)
ABSOLUTE LYMPH #: 1.75 K/UL (ref 1.1–3.7)
ABSOLUTE MONO #: 1 K/UL (ref 0.1–1.2)
ALBUMIN SERPL-MCNC: 2.6 G/DL (ref 3.5–5.2)
ALBUMIN/GLOBULIN RATIO: 0.8 (ref 1–2.5)
ALP BLD-CCNC: 77 U/L (ref 35–104)
ALT SERPL-CCNC: <5 U/L (ref 5–33)
ANION GAP SERPL CALCULATED.3IONS-SCNC: 10 MMOL/L (ref 9–17)
AST SERPL-CCNC: 10 U/L
BASOPHILS # BLD: 1 % (ref 0–2)
BASOPHILS ABSOLUTE: 0.13 K/UL (ref 0–0.2)
BILIRUB SERPL-MCNC: 0.23 MG/DL (ref 0.3–1.2)
BILIRUBIN DIRECT: 0.09 MG/DL
BILIRUBIN, INDIRECT: 0.14 MG/DL (ref 0–1)
BUN BLDV-MCNC: 4 MG/DL (ref 6–20)
BUN/CREAT BLD: ABNORMAL (ref 9–20)
CALCIUM IONIZED: 1.01 MMOL/L (ref 1.13–1.33)
CALCIUM SERPL-MCNC: 8.2 MG/DL (ref 8.6–10.4)
CHLORIDE BLD-SCNC: 103 MMOL/L (ref 98–107)
CO2: 24 MMOL/L (ref 20–31)
CREAT SERPL-MCNC: 0.25 MG/DL (ref 0.5–0.9)
CULTURE: NORMAL
DIFFERENTIAL TYPE: ABNORMAL
DIRECT EXAM: NORMAL
EOSINOPHILS RELATIVE PERCENT: 2 % (ref 1–4)
GFR AFRICAN AMERICAN: >60 ML/MIN
GFR NON-AFRICAN AMERICAN: >60 ML/MIN
GFR SERPL CREATININE-BSD FRML MDRD: ABNORMAL ML/MIN/{1.73_M2}
GFR SERPL CREATININE-BSD FRML MDRD: ABNORMAL ML/MIN/{1.73_M2}
GLOBULIN: ABNORMAL G/DL (ref 1.5–3.8)
GLUCOSE BLD-MCNC: 125 MG/DL (ref 70–99)
HCT VFR BLD CALC: 35.8 % (ref 36.3–47.1)
HEMOGLOBIN: 10.2 G/DL (ref 11.9–15.1)
IMMATURE GRANULOCYTES: 1 %
LYMPHOCYTES # BLD: 14 % (ref 24–43)
Lab: NORMAL
MAGNESIUM: 1.5 MG/DL (ref 1.6–2.6)
MCH RBC QN AUTO: 23.1 PG (ref 25.2–33.5)
MCHC RBC AUTO-ENTMCNC: 28.5 G/DL (ref 28.4–34.8)
MCV RBC AUTO: 81.2 FL (ref 82.6–102.9)
MONOCYTES # BLD: 8 % (ref 3–12)
MORPHOLOGY: ABNORMAL
NRBC AUTOMATED: 0 PER 100 WBC
PDW BLD-RTO: 25.7 % (ref 11.8–14.4)
PLATELET # BLD: 738 K/UL (ref 138–453)
PLATELET ESTIMATE: ABNORMAL
PMV BLD AUTO: 8.8 FL (ref 8.1–13.5)
POTASSIUM SERPL-SCNC: 3.9 MMOL/L (ref 3.7–5.3)
RBC # BLD: 4.41 M/UL (ref 3.95–5.11)
RBC # BLD: ABNORMAL 10*6/UL
SEG NEUTROPHILS: 74 % (ref 36–65)
SEGMENTED NEUTROPHILS ABSOLUTE COUNT: 9.24 K/UL (ref 1.5–8.1)
SODIUM BLD-SCNC: 137 MMOL/L (ref 135–144)
SPECIMEN DESCRIPTION: NORMAL
TOTAL PROTEIN: 5.8 G/DL (ref 6.4–8.3)
WBC # BLD: 12.5 K/UL (ref 3.5–11.3)
WBC # BLD: ABNORMAL 10*3/UL

## 2021-01-08 PROCEDURE — 97166 OT EVAL MOD COMPLEX 45 MIN: CPT

## 2021-01-08 PROCEDURE — 99232 SBSQ HOSP IP/OBS MODERATE 35: CPT | Performed by: INTERNAL MEDICINE

## 2021-01-08 PROCEDURE — 6360000002 HC RX W HCPCS: Performed by: INTERNAL MEDICINE

## 2021-01-08 PROCEDURE — 6370000000 HC RX 637 (ALT 250 FOR IP): Performed by: NURSE PRACTITIONER

## 2021-01-08 PROCEDURE — 36415 COLL VENOUS BLD VENIPUNCTURE: CPT

## 2021-01-08 PROCEDURE — 83735 ASSAY OF MAGNESIUM: CPT

## 2021-01-08 PROCEDURE — 2580000003 HC RX 258: Performed by: NURSE PRACTITIONER

## 2021-01-08 PROCEDURE — 2580000003 HC RX 258: Performed by: INTERNAL MEDICINE

## 2021-01-08 PROCEDURE — 85025 COMPLETE CBC W/AUTO DIFF WBC: CPT

## 2021-01-08 PROCEDURE — 6370000000 HC RX 637 (ALT 250 FOR IP): Performed by: STUDENT IN AN ORGANIZED HEALTH CARE EDUCATION/TRAINING PROGRAM

## 2021-01-08 PROCEDURE — 82330 ASSAY OF CALCIUM: CPT

## 2021-01-08 PROCEDURE — 6360000002 HC RX W HCPCS: Performed by: NURSE PRACTITIONER

## 2021-01-08 PROCEDURE — 80048 BASIC METABOLIC PNL TOTAL CA: CPT

## 2021-01-08 PROCEDURE — 80076 HEPATIC FUNCTION PANEL: CPT

## 2021-01-08 PROCEDURE — 6360000002 HC RX W HCPCS: Performed by: STUDENT IN AN ORGANIZED HEALTH CARE EDUCATION/TRAINING PROGRAM

## 2021-01-08 PROCEDURE — 2060000000 HC ICU INTERMEDIATE R&B

## 2021-01-08 PROCEDURE — 97535 SELF CARE MNGMENT TRAINING: CPT

## 2021-01-08 PROCEDURE — 99233 SBSQ HOSP IP/OBS HIGH 50: CPT | Performed by: INTERNAL MEDICINE

## 2021-01-08 RX ORDER — MAGNESIUM SULFATE 1 G/100ML
1 INJECTION INTRAVENOUS PRN
Status: DISCONTINUED | OUTPATIENT
Start: 2021-01-08 | End: 2021-01-14 | Stop reason: HOSPADM

## 2021-01-08 RX ORDER — HYDROCODONE BITARTRATE AND ACETAMINOPHEN 5; 325 MG/1; MG/1
1 TABLET ORAL EVERY 4 HOURS PRN
Status: DISCONTINUED | OUTPATIENT
Start: 2021-01-08 | End: 2021-01-10

## 2021-01-08 RX ORDER — ASPIRIN 81 MG/1
81 TABLET, CHEWABLE ORAL DAILY
Status: DISCONTINUED | OUTPATIENT
Start: 2021-01-08 | End: 2021-01-14 | Stop reason: HOSPADM

## 2021-01-08 RX ORDER — METOPROLOL SUCCINATE 25 MG/1
25 TABLET, EXTENDED RELEASE ORAL DAILY
Status: DISCONTINUED | OUTPATIENT
Start: 2021-01-08 | End: 2021-01-14 | Stop reason: HOSPADM

## 2021-01-08 RX ORDER — MORPHINE SULFATE 2 MG/ML
1 INJECTION, SOLUTION INTRAMUSCULAR; INTRAVENOUS ONCE
Status: COMPLETED | OUTPATIENT
Start: 2021-01-08 | End: 2021-01-08

## 2021-01-08 RX ADMIN — HYDROCODONE BITARTRATE AND ACETAMINOPHEN 1 TABLET: 5; 325 TABLET ORAL at 23:03

## 2021-01-08 RX ADMIN — ATORVASTATIN CALCIUM 20 MG: 20 TABLET, FILM COATED ORAL at 09:01

## 2021-01-08 RX ADMIN — METOPROLOL SUCCINATE 25 MG: 25 TABLET, FILM COATED, EXTENDED RELEASE ORAL at 17:29

## 2021-01-08 RX ADMIN — MEROPENEM 1 G: 1 INJECTION, POWDER, FOR SOLUTION INTRAVENOUS at 01:29

## 2021-01-08 RX ADMIN — PANTOPRAZOLE SODIUM 40 MG: 40 TABLET, DELAYED RELEASE ORAL at 17:29

## 2021-01-08 RX ADMIN — FERROUS SULFATE TAB EC 325 MG (65 MG FE EQUIVALENT) 325 MG: 325 (65 FE) TABLET DELAYED RESPONSE at 09:01

## 2021-01-08 RX ADMIN — MORPHINE SULFATE 1 MG: 2 INJECTION, SOLUTION INTRAMUSCULAR; INTRAVENOUS at 23:03

## 2021-01-08 RX ADMIN — SODIUM CHLORIDE, PRESERVATIVE FREE 10 ML: 5 INJECTION INTRAVENOUS at 09:01

## 2021-01-08 RX ADMIN — HYDROCODONE BITARTRATE AND ACETAMINOPHEN 1 TABLET: 5; 325 TABLET ORAL at 09:15

## 2021-01-08 RX ADMIN — HYDROCODONE BITARTRATE AND ACETAMINOPHEN 1 TABLET: 5; 325 TABLET ORAL at 17:30

## 2021-01-08 RX ADMIN — MEROPENEM 1 G: 1 INJECTION, POWDER, FOR SOLUTION INTRAVENOUS at 17:45

## 2021-01-08 RX ADMIN — PANTOPRAZOLE SODIUM 40 MG: 40 TABLET, DELAYED RELEASE ORAL at 09:02

## 2021-01-08 RX ADMIN — LEVETIRACETAM 1500 MG: 15 INJECTION INTRAVENOUS at 11:00

## 2021-01-08 RX ADMIN — POTASSIUM BICARBONATE 40 MEQ: 782 TABLET, EFFERVESCENT ORAL at 09:01

## 2021-01-08 RX ADMIN — LEVETIRACETAM 1500 MG: 15 INJECTION INTRAVENOUS at 21:37

## 2021-01-08 RX ADMIN — SERTRALINE 50 MG: 50 TABLET, FILM COATED ORAL at 09:01

## 2021-01-08 RX ADMIN — ASPIRIN 81 MG: 81 TABLET, CHEWABLE ORAL at 17:30

## 2021-01-08 RX ADMIN — MEROPENEM 1 G: 1 INJECTION, POWDER, FOR SOLUTION INTRAVENOUS at 11:15

## 2021-01-08 RX ADMIN — ENOXAPARIN SODIUM 40 MG: 40 INJECTION SUBCUTANEOUS at 09:01

## 2021-01-08 ASSESSMENT — PAIN DESCRIPTION - LOCATION
LOCATION: BACK;BUTTOCKS
LOCATION: BUTTOCKS;RECTUM
LOCATION: BACK;BUTTOCKS

## 2021-01-08 ASSESSMENT — PAIN - FUNCTIONAL ASSESSMENT
PAIN_FUNCTIONAL_ASSESSMENT: PREVENTS OR INTERFERES SOME ACTIVE ACTIVITIES AND ADLS
PAIN_FUNCTIONAL_ASSESSMENT: PREVENTS OR INTERFERES SOME ACTIVE ACTIVITIES AND ADLS

## 2021-01-08 ASSESSMENT — PAIN DESCRIPTION - ORIENTATION
ORIENTATION: LOWER
ORIENTATION: LOWER

## 2021-01-08 ASSESSMENT — ENCOUNTER SYMPTOMS
COLOR CHANGE: 0
EYE DISCHARGE: 0
APNEA: 0
ABDOMINAL DISTENTION: 0

## 2021-01-08 ASSESSMENT — PAIN SCALES - GENERAL
PAINLEVEL_OUTOF10: 2
PAINLEVEL_OUTOF10: 6
PAINLEVEL_OUTOF10: 8
PAINLEVEL_OUTOF10: 7
PAINLEVEL_OUTOF10: 2

## 2021-01-08 ASSESSMENT — PAIN DESCRIPTION - PAIN TYPE
TYPE: ACUTE PAIN
TYPE: ACUTE PAIN

## 2021-01-08 ASSESSMENT — PAIN DESCRIPTION - DESCRIPTORS
DESCRIPTORS: ACHING;DISCOMFORT
DESCRIPTORS: ACHING;DISCOMFORT

## 2021-01-08 NOTE — PROGRESS NOTES
Physician Progress Note      Arcadio Lilly  CSN #:                  [id number]  :                       1965  ADMIT DATE:       2021 4:09 PM  [address] [place] DATE:  RESPONDING  PROVIDER #:        Felicia Carroll MD          QUERY TEXT:    Pt admitted with seizures. Pt noted to have SIRS/+ fluid cx. If possible,   please document in the progress notes and discharge summary if you are   evaluating and /or treating any of the following: The medical record reflects the following:  Risk Factors: recent splenic abscess, pleural drain  Clinical Indicators: pleural fluid cx + LACTOSE FERMENTING GRAM NEGATIVE RODS,   per ID awaiting final cultures from recent splenic abscess, blood cultures   pending, WBC 31.4, tachycardic 110's, RR 20's, procalcitonin 0.85, acute resp   failure  Treatment: IV Meropenem, ID and gen surg consults, CT abd, labs, cultures    Call if any questions. Thank you, Kirby Manning, [facility] [phone]  Options provided:  -- Sepsis, present on admission  -- No Sepsis, pleural infection and/or splenic abscess only  -- Sepsis was ruled out  -- Other - I will add my own diagnosis  -- Disagree - Not applicable / Not valid  -- Disagree - Clinically unable to determine / Unknown  -- Refer to Clinical Documentation Reviewer    PROVIDER RESPONSE TEXT:    This patient has sepsis which was present on admission.     Query created by: Bhupinder Branch on 2021 9:09 AM      Electronically signed by:  Felicia Carroll MD 2021 7:03 PM

## 2021-01-08 NOTE — PROGRESS NOTES
Physical Therapy  DATE: 2021    NAME: Myrtice Soulier  MRN: [id number]   : 1965    Patient not seen this date for Physical Therapy due to:  [] Blood transfusion in progress  [] Hemodialysis  [x] Patient Declined--had recently worked with OT, trying to eat breakfast; tearful d/t pain from rectal tube; check 21  [] Spine Precautions   [] Strict Bedrest  [] Surgery/ Procedure  [] Testing      [] Other        [] PT is being discontinued at this time. Patient independent. No further needs. [] PT is being discontinued at this time due to declining physical/ medical status. Therapy is not appropriate at this time.     Melinda Reina, PT

## 2021-01-08 NOTE — PROGRESS NOTES
[facility] -   Infectious diseases evaluation  admission date 1/4/2021    reason for consultation:   leukocytosis    Impression :   Current:  · splenic abscess - GNR - drained 12/2020  · Post spleen embolization for mass, 10/2020 [facility]  · ovarian metastatic cancer  · 1/5 - SZ and status epilepticus  · intubated  · bandemia  · Rash on ceftriaxone, [facility]  Discussion / summary of stay / plan of care   ·   Recommendations   · Keep meropenem for now-adjust to final culture from our hepatic spleen abscess culture  · Discussed with neurosurgery  · She remains full code, oncology on board    Infection Control Recommendations   · [place] Precautions  · Contact Isolation     Antimicrobial Stewardship Recommendations   · Simplification of therapy  · Targeted therapy    Coordination ofOutpatient Care:   · Estimated Length of IV antimicrobials:  · Patient will need Midline / picc Catheter Insertion:   · Patient will need SNF:  · Patient will need outpatient wound care:     History of Present Illness:   Initial history:  Zuleyma Notice is a 60y.o.-year-old female w ovarian CA came w status epilepticus, found down w SZ, intubated. Hx ovarian CA w mets to abd and lungs,  Recent GI bleed STL 10/2020, post embolization of the spleen for splenic mass. 12/2020 admitted w left splenic abscess and drained and sent recently on invanz [aspirate showed E. coli sensitive to ceftriaxone, Bacteroides, strep viridans. When the patient was given ceftriaxone he had a rash and allergy reaction, hence it was stopped and he was sent on Invanz]  - left pleural fluid aspirated was then neg-    At admission to [facility],  found w tarry fluid from the NGT and clots in ETT. possibly from nasal attempted intubation - cleared alter  On-  Still full code. Left spleen drainage purulent. And now cx w GNR  Leukocytosis noticed, we are called for opinion. 1/5 left perisplenic fluid aspirated and grew GNR    Interval changes  1/8/2021   Patient Vitals for the past 8 hrs:   BP Temp Temp src Pulse Resp SpO2   01/07/21 2100 114/70   98 21 97 %   01/07/21 2000 112/65 99 °F (37.2 °C) Oral 99 20 98 %   01/07/21 1900 116/69   103 21 97 %   01/07/21 1702 (!) 116/102   99 18 97 %   01/07/21 1630  98.9 °F (37.2 °C) Oral        Extubated 1/7  abd soft and lungs clear    Drain with purulence, E coli multiS and diohteroids  Continue meropenem which she is tolerating, no signs of sepsis  White count is improving  Oncology on board    Summary of relevant labs:  Labs:  WBC 32 -14  Micro:  BC 1/5 x 2  U cx 1/5   Perisplenic fluid  1/5/21  · cx 1/5 GNR,   · LDH 14237  · RBC 85471  · WBC 813088    1/4 sputum negative for malignancy    Imaging:  CT AP 1/5  Interval placement of a percutaneous pigtail catheter left splenic complex   mass. Mesenteric, retroperitoneal, and left inguinal pathologic lymphadenopathy   consistent with metastatic disease. Multiple pulmonary nodules consistent with metastatic disease.  Prominent   right hilar lymph node. New left lower lobe consolidation and effusion. Diffuse bony blastic metastatic disease. MRI brain   Impression:  1. No acute intracranial abnormality. Specifically, no acute infarction, mesial temporal sclerosis, or intracranial metastatic disease. 2. Mild parenchymal volume loss. Sequela of mild chronic microvascular   ischemic changes. CXR 1/5/21  Left basilar opacity and left perihilar infiukltrate        I have personally reviewed the past medical history, past surgical history, medications, social history, and family history, and I haveupdated the database accordingly. Allergies:   Ceftriaxone     Review of Systems:     Review of Systems   Constitutional: Positive for activity change. Negative for appetite change. HENT: Negative for congestion. Eyes: Negative for discharge. Respiratory: Negative for apnea. Cardiovascular: Negative for chest pain. Gastrointestinal: Negative for abdominal distention. Endocrine: Negative for heat intolerance. Genitourinary: Negative for dysuria. Musculoskeletal: Negative for arthralgias. Skin: Negative for color change. Allergic/Immunologic: Negative for immunocompromised state. Neurological: Negative for dizziness. Hematological: Negative for adenopathy. Psychiatric/Behavioral: Negative for agitation. Physical Examination :       Physical Exam  Constitutional:       General: She is not in acute distress. Appearance: Normal appearance. She is not ill-appearing or toxic-appearing. HENT:      Head: Normocephalic and atraumatic. Nose: Nose normal.      Mouth/Throat:      Mouth: Mucous membranes are moist.   Eyes:      Conjunctiva/sclera: Conjunctivae normal.      Pupils: Pupils are equal, round, and reactive to light. Neck:      Musculoskeletal: No muscular tenderness. Cardiovascular:      Rate and Rhythm: Normal rate and regular rhythm. Heart sounds: Normal heart sounds. No murmur. Pulmonary:      Effort: No respiratory distress. Breath sounds: Normal breath sounds. Abdominal:      General: There is no distension. Palpations: Abdomen is soft. Comments: Left UQ  drain w pus   Genitourinary:     Comments: Urina cristiano  Musculoskeletal:         General: No swelling. Right lower leg: No edema. Left lower leg: No edema. Skin:     Coloration: Skin is not jaundiced or pale. Findings: No bruising. Neurological:      General: No focal deficit present. Mental Status: She is alert and oriented to person, place, and time.    Psychiatric:         Mood and Affect: Mood normal.         Behavior: Behavior normal.      Comments: Appropriate          Past Medical History:     Past Medical History:   Diagnosis Date    Anemia     Bleeding 10/2020 intra-abdominal bleeding -due to splenic mass with GI infiltration. Status post embolization    [facility]     Depression     Diabetes mellitus ([facility]     GERD (gastroesophageal reflux disease)     Metastatic cancer ([facility] [address] [facility] 10/2020    extensive intraabdominal and splenic involvement and lung mets.  Ovarian cancer ([facility] [address] [facility]     low grade serous ovarian carcinoma    Post chemo evaluation     2007: Chemo via med onc (Dr. Odalys Bean), 2008: Sybil Caras due to rising CA-125, 2013: intraperitoneal chemo,12/2015: Ca125 - 25     Splenic lesion        Past Surgical  History:     Past Surgical History:   Procedure Laterality Date    ABSCESS DRAINAGE  2013    Franca rectal    ANUS SURGERY      ANAL FISSURECTOMY    COLECTOMY  03/2013    ex lap, tumor debulking, transverse colectomy w reanastamosis, subgastric omentectomy, intraperitoneal port placement    HYSTERECTOMY, TOTAL ABDOMINAL      IR EMBOLIZATION HEMORRHAGE  10/05/2020    intra-abdominal bleeding -due to splenic mass with GI infiltration. Status post embolization [employer] interlock coils x7. mri condtional 3t ok, safe immediately post implant.     IR PORT PLACEMENT EQUAL OR GREATER THAN 5 YEARS  8/24/2020    IR PORT PLACEMENT EQUAL OR GREATER THAN 5 YEARS 8/24/2020 Leonor Flor MD [facility] SPECIAL PROCEDURES    PORT SURGERY      IP Port    TONSILLECTOMY         Medications:      pantoprazole  40 mg Oral BID AC    enoxaparin  40 mg Subcutaneous Daily    potassium bicarb-citric acid  40 mEq Oral Daily    meropenem  1 g Intravenous Q8H    sodium chloride flush  10 mL Intravenous 2 times per day    ferrous sulfate  325 mg Oral Daily with breakfast    sertraline  50 mg Oral Daily    atorvastatin  20 mg Oral Daily    levetiracetam  1,500 mg Intravenous Q12H       Social History:     Social History     Socioeconomic History    Marital status: Single     Spouse name: Not on file    Number of children: Not on file  Years of education: Not on file    Highest education level: Not on file   Occupational History    Not on file   Social Needs    Financial resource strain: Not on file    Food insecurity     Worry: Not on file     Inability: Not on file    Transportation needs     Medical: Not on file     Non-medical: Not on file   Tobacco Use    Smoking status: Current Every Day Smoker     Packs/day: 1.00    Smokeless tobacco: Current User   Substance and Sexual Activity    Alcohol use: Not Currently    Drug use: Never    Sexual activity: Not on file   Lifestyle    Physical activity     Days per week: Not on file     Minutes per session: Not on file    Stress: Not on file   Relationships    Social connections     Talks on phone: Not on file     Gets together: Not on file     Attends Latter-day service: Not on file     Active member of club or organization: Not on file     Attends meetings of clubs or organizations: Not on file     Relationship status: Not on file    Intimate partner violence     Fear of current or ex partner: Not on file     Emotionally abused: Not on file     Physically abused: Not on file     Forced sexual activity: Not on file   Other Topics Concern    Not on file   Social History Narrative    Not on file       Family History:     Family History   Problem Relation Age of Onset    Alcohol Abuse Mother     Cirrhosis Mother       Medical Decision Making:   I have independently reviewed/ordered the following labs:    CBC with Differential:   Recent Labs     01/06/21  0408 01/07/21  0325   WBC 14.9* 14.3*   HGB 10.1* 10.3*   HCT 34.8* 36.2*   * 768*   LYMPHOPCT 14* 14*   MONOPCT 8* 7     BMP:  Recent Labs     01/06/21  0408 01/07/21  0325    143   K 3.3* 3.3*   * 107   CO2 25 24   BUN 3* 4*   CREATININE 0.31* 0.31*   MG 2.2 1.7     Hepatic Function Panel:   Recent Labs     01/06/21  0408 01/07/21  0325   PROT 5.9* 6.0*   LABALBU 2.8* 2.9*   BILIDIR 0.08 0.09 IBILI CANNOT BE CALCULATED 0.16   BILITOT <0.10* 0.25*   ALKPHOS 82 83   ALT 5 5   AST 17 12     No results for input(s): RPR in the last 72 hours. No results for input(s): HIV in the last 72 hours. No results for input(s): BC in the last 72 hours. Lab Results   Component Value Date    CREATININE 0.31 01/07/2021    GLUCOSE 94 01/07/2021       Detailed results: Thank you for allowing us to participate in the care of this patient. Please call with questions. This note is created with the assistance of a speech recognition program.  While intending to generate adocument that actually reflects the content of the visit, the document can still have some errors including those of syntax and sound a like substitutions which may escape proof reading. It such instances, actual meaningcan be extrapolated by contextual diversion.     Dawson Briggs MD  Office: [phone]  Perfect serve / office [phone]

## 2021-01-08 NOTE — PROGRESS NOTES
Progress Note               Date:                           1/8/2021  Patient name:           Louanna Gaucher [facility]  Date of admission:  1/4/2021  4:09 PM  MRN:   [id number]  YOB: 1965  PCP:                           No primary care provider on file. Subjective:   Patient seen and chart reviewed. She has been extubated and tolerated it well. Currently on 2 L nasal cannula. Able to converse appropriately. Slightly depressed and tearful as she is aware of her medical condition. Her daughter is at her bedside today. Case discussed with them. They voiced understanding of the patient's medical condition. Will likely have further discussion regarding goals of care today. HPI in Brief:   The patient is a 60 y.o.  female who is admitted to the hospital for uncontrolled seizures. She is intubated. Seizures subsided. Pt is known to us with metastatic ovarian cancer on Doxil and avastin (recently held due to Gi bleeding, chemo was started 9/2020   She has been in a nd out of the hospital for recurrent seizures. brain imaging showed no mets  ( done at [facility]?)   Pt is intubated in ER. No bleeding now LTME in place, no current seizures      Past Medical History:   has a past medical history of Anemia, Bleeding, Cervical cancer ([address] Depression, Diabetes mellitus ([address] GERD (gastroesophageal reflux disease), Metastatic cancer ([address] Ovarian cancer ([address] Post chemo evaluation, and Splenic lesion.     Past Surgical History:   has a past surgical history that includes Hysterectomy, total abdominal; Port Surgery; Tonsillectomy; IR PORT PLACEMENT > 5 YEARS (8/24/2020); Anus surgery; Abscess Drainage (2013); colectomy (03/2013); and IR EMBOLIZATION HEMORRHAGE (10/05/2020). Review of systems cannot be obtained as the patient is intubated and sedated.        Medications:   Reviewed in Epic     Objective: Vitals: /72   Pulse 94   Temp 98.8 °F (37.1 °C) (Oral)   Resp 17   Ht 5' 6\" (1.676 m)   Wt 165 lb (74.8 kg)   SpO2 100%   BMI 26.63 kg/m²       General appearance - intubated and sedated. Eyes - pupils equal and reactive,  Ears - bilateral TM's and external ear canals normal  Mouth - mucous membranes moist, ETT in place   Neck - supple, no significant adenopathy  Chest - scattered rhonchi   Heart - normal rate, regular rhythm, normal S1, S2,   Abdomen - soft, nontender, nondistended, no masses or organomegaly  Neurological - sedated and intubated. Extremities - peripheral pulses normal, no pedal edema, no clubbing or cyanosis  Skin - pale, no bleeding. Data:  No intake/output data recorded. No intake/output data recorded. CBC:   Recent Labs     01/06/21  0408 01/07/21  0325 01/08/21  0514   WBC 14.9* 14.3* 12.5*   HGB 10.1* 10.3* 10.2*   * 768* 738*     BMP:    Recent Labs     01/06/21  0408 01/07/21  0325 01/08/21  0514    143 137   K 3.3* 3.3* 3.9   * 107 103   CO2 25 24 24   BUN 3* 4* 4*   CREATININE 0.31* 0.31* 0.25*   GLUCOSE 104* 94 125*     Hepatic:   Recent Labs     01/06/21  0408 01/07/21  0325 01/08/21  0514   AST 17 12 10   ALT 5 5 <5*   BILITOT <0.10* 0.25* 0.23*   ALKPHOS 82 83 77     INR: No results for input(s): INR in the last 72 hours. IMAGING DATA:    Problem Lists:   Primary Problem:  <principal problem not specified>   Current Problems:  Active Hospital Problems    Diagnosis Date Noted    History of malignant neoplasm of ovary [Z85.43]     Status epilepticus ([address] [G40.901] 01/04/2021    Seizure ([facility] [R56.9] 10/21/2020     PMH:  Past Medical History:   Diagnosis Date    Anemia     Bleeding 10/2020    intra-abdominal bleeding -due to splenic mass with GI infiltration.   Status post embolization    Cervical cancer ([facility]     Depression     Diabetes mellitus ([facility]     GERD (gastroesophageal reflux disease)     Metastatic cancer ([facility] 10/2020 extensive intraabdominal and splenic involvement and lung mets.  Ovarian cancer ([facility]     low grade serous ovarian carcinoma    Post chemo evaluation     2007: Chemo via med onc (Dr. Shashi Ceja), 2008: Evelia Maria Fernanda due to rising CA-125, 2013: intraperitoneal chemo,12/2015: Ca125 - 25     Splenic lesion       Allergies: Allergies   Allergen Reactions    Ceftriaxone      Had a rash on ceftriaxone December 2020, [facility]      Assessment    1. Metastatic ovarian cancer. 2. Stage IV disease with diffuse metastasis per CT. 3. Recurrent seizures. 4. Acute respiratory failure secondary to status epilepticus likely from metastasis from underlying malignancy. 5. Exudative pleural effusion. Plan     1. Discussed with the patient and her family. 2. Clinically she is significantly better and seizures are controlled. Discussed further treatment for her ovarian cancer. Supportive care versus chemotherapy treatment. 3. Patient and family are interested in more active treatment. This will be addressed further as outpatient based on her performance. 4. We will follow. José Luis Quiroz M.D. Internal Medicine Resident , PGY-3  [address] [place] [address]  01/08/21 3:58 PM    Attending Physician Statement  The patient was seen and examined during rounds, I have discussed the care of Dwani Eubanks, including pertinent history and exam findings with the resident. I have reviewed the key elements of all parts of the encounter with the resident. I agree with the assessment, and status of the problem list as documented.    Additional assessment/ plan        Freya Garcia MD  Hematology Oncology  [phone]  Electronically signed by Cary Ball MD on 1/8/2021 at 4:12 PM

## 2021-01-08 NOTE — PLAN OF CARE
Problem: Safety:  Goal: Ability to remain free from injury will improve  Description: Ability to remain free from injury will improve  1/8/2021 0626 by Merlin Heys, RN  Outcome: Met This Shift

## 2021-01-08 NOTE — PROGRESS NOTES
Occupational Therapy   Occupational Therapy Initial Assessment  Date: 2021   Patient Name: Lauren Brennan  MRN: [id number]     : 1965    Date of Service: 2021    Discharge Recommendations:  Patient would benefit from continued therapy after discharge  OT Equipment Recommendations  Equipment Needed: Yes  Mobility Devices: [place]: Rolling    Assessment   Performance deficits / Impairments: Decreased functional mobility ; Decreased ADL status; Decreased safe awareness;Decreased cognition;Decreased balance;Decreased high-level IADLs  Assessment: Pt agreeable to OT eval this date. Pt completed bed mobility requiring Min A. Pt c/o buttocks pain throughout movement in bed d/t FMS. Pt ed on pain management and breathing tech with fair carryover. Pt completed LB dressing and toileting tasks this date, see below for level of assistance needed. At this time, pt to benefit from continued OT services to maximize safety, independence, and balance for ADL/IADL performance. Prognosis: Good  Decision Making: Medium Complexity  Patient Education: Pt ed on OT role, OT POC, safety awareness, RW use, pain , hand placement during transfers, and importance of continued OT. Pt verbalized good understanding  REQUIRES OT FOLLOW UP: Yes  Activity Tolerance  Activity Tolerance: Patient Tolerated treatment well;Patient limited by pain  Safety Devices  Safety Devices in place: Yes  Type of devices: Nurse notified; Left in bed;Gait belt;Call light within reach; Bed alarm in place(bed buddies in place, telesitter present)  Restraints  Initially in place: No           Patient Diagnosis(es): The encounter diagnosis was Seizure [facility]). has a past medical history of Anemia, Bleeding, Cervical cancer ([facility] Depression, Diabetes mellitus ([facility] GERD (gastroesophageal reflux disease), Metastatic cancer ([facility] Ovarian cancer ([facility] Post chemo evaluation, and Splenic lesion. has a past surgical history that includes Hysterectomy, total abdominal; Port Surgery; Tonsillectomy; IR PORT PLACEMENT > 5 YEARS (8/24/2020); Anus surgery; Abscess Drainage (2013); colectomy (03/2013); and IR EMBOLIZATION HEMORRHAGE (10/05/2020). Restrictions  Restrictions/Precautions  Restrictions/Precautions: Seizure, Fall Risk  Required Braces or Orthoses?: No  Position Activity Restriction  Other position/activity restrictions: up with assist; SBP <140; FMS    Subjective   General  Patient assessed for rehabilitation services?: Yes  Family / Caregiver Present: No  General Comment  Comments: RN ok'd pt for OT eval this date. Pt agreeable to session and pleasant/cooperative throughout   Patient Currently in Pain: Yes  Pain Assessment  Pain Assessment: 0-10  Pain Level: 7  Pain Type: Acute pain  Pain Location: Buttocks; Rectum  Pain Descriptors: Aching;Discomfort  Functional Pain Assessment: Prevents or interferes some active activities and ADLs  Response to Pain Intervention: Patient Satisfied  Pre Treatment Pain Screening  Intervention List: Patient able to continue with treatment  Vital Signs  Patient Currently in Pain: Yes     Social/Functional History  Social/Functional History  Lives With: Son(pt reports son is home most of the time)  Type of Home: Mobile home  Home Layout: One level  Home Access: Stairs to enter with rails  Entrance Stairs - Number of Steps: 3  Entrance Stairs - Rails: Left  Bathroom Shower/Tub: Tub/Shower unit  Bathroom Toilet: Standard  Bathroom Equipment: Shower chair  Home Equipment: (no DME use at baseline)  ADL Assistance: [address]: Independent  Homemaking Responsibilities: Yes(shared with son)  Ambulation Assistance: Independent  Transfer Assistance: Independent  Active : No  Patient's  Info: son  Occupation: On disability  Type of occupation: pt reports is on [address] [employer]; [employer] normally working full time Leisure & Hobbies: pt enjoys painting       Objective   Vision: Impaired  Vision Exceptions: Wears glasses at all times  Hearing: Within functional limits    Orientation  Overall Orientation Status: Within Functional Limits    Balance  Sitting Balance: Stand by assistance  Standing Balance: Contact guard assistance  Standing Balance  Time: ~3 minutes  Activity: toileting task, transfers  Functional Mobility  Functional - Mobility Device: Rolling Walker  Activity: To/from bathroom  Assist Level: Contact guard assistance  Functional Mobility Comments: 1 LOB requiring Min A to correct otherwise CGA with RW use for functional mobility to/from bathroom  Toilet Transfers  Toilet - Technique: Stand step  Equipment Used: Grab bars  Toilet Transfer: Contact guard assistance     ADL  Feeding: Independent;Setup(setup with breakfast at end of session; able to feed self independently)  Grooming: Contact guard assistance;Setup  UE Bathing: Minimal assistance;Setup; Increased time to complete  LE Bathing: Setup;Minimal assistance; Increased time to complete  UE Dressing: Minimal assistance;Setup; Increased time to complete  LE Dressing: Minimal assistance;Setup; Increased time to complete(pt donned B socks while supine in bed; unable to complete while seated EOB d/t pain in buttocks)  Toileting: Setup;Minimal assistance; Increased time to complete(pt completed toileting task this date with Min A for pericare and transfer with grab bar use)  Tone RUE  RUE Tone: Normotonic  Tone LUE  LUE Tone: Normotonic  Coordination  Movements Are Fluid And Coordinated: Yes    Bed mobility  Rolling to Left: Contact guard assistance  Rolling to Right: Contact guard assistance  Supine to Sit: Minimal assistance(Min A for trunk progression)  Sit to Supine: Minimal assistance(Min A for BLE progression)  Scooting: Minimal assistance  Transfers  Sit to stand: Contact guard assistance  Stand to sit: Contact guard assistance Transfer Comments: VCs for hand placement with fair carryover    Cognition  Overall Cognitive Status: Exceptions  Arousal/Alertness: Delayed responses to stimuli  Following Commands:  Follows one step commands with repetition  Safety Judgement: Decreased awareness of need for safety;Decreased awareness of need for assistance  Problem Solving: Assistance required to correct errors made  Insights: Decreased awareness of deficits  Initiation: Requires cues for some  Sequencing: Requires cues for some       Sensation  Overall Sensation Status: WFL(pt denies numbness/tingling at this time)        LUE AROM (degrees)  LUE AROM : WFL  Left Hand AROM (degrees)  Left Hand AROM: WFL  RUE AROM (degrees)  RUE AROM : WFL  Right Hand AROM (degrees)  Right Hand AROM: WFL  LUE Strength  Gross LUE Strength: WFL  L Hand General: 4+/5  LUE Strength Comment: grossly 4+/5  RUE Strength  Gross RUE Strength: WFL  R Hand General: 4+/5  RUE Strength Comment: grossly 4+/5                   Plan   Plan  Times per week: 3-5 x/wk  Current Treatment Recommendations: Balance Training, Functional Mobility Training, Endurance Training, Cognitive Reorientation, Pain Management, Safety Education & Training, Patient/Caregiver Education & Training, Equipment Evaluation, Education, & procurement, Self-Care / ADL, Home Management Training    AM-PAC Score        AM-[facility] Inpatient Daily Activity Raw Score: 19 (01/08/21 1449)  AM-PAC Inpatient ADL T-Scale Score : 40.22 (01/08/21 1449)  ADL Inpatient CMS 0-100% Score: 42.8 (01/08/21 1449)  ADL Inpatient CMS G-Code Modifier : CK (01/08/21 1449)    Goals  Short term goals  Time Frame for Short term goals: By discharge, pt will:  Short term goal 1: Demo Mod I with use of LRD PRN for functional transfers and functional mobility  Short term goal 2: Demo I with setup for UB ADLs and grooming tasks  Short term goal 3: Demo I with setup for LB ADLs and toileting tasks Short term goal 4: Demo 10+ minutes dynamic standing and reaching task to increase independence and safety with ADLs/IADLs  Short term goal 5: Demo ability to complete 3-step functional task to increase cognition for functional performance of ADLs/IADLs       Therapy Time   Individual Concurrent Group Co-treatment   Time In 0904         Time Out 0935         Minutes 31         Timed Code Treatment Minutes: 25 Minutes       Azul Guevara OTR/L

## 2021-01-08 NOTE — PROGRESS NOTES
[facility]  Internal Medicine Teaching Residency Program  Inpatient Daily Progress Note  ______________________________________________________________________________    Patient: Manfred [facility]  YOB: 1965   ODX:[id number]    Acct: [de-identified]     Room: [facility]43-  Admit date: 1/4/2021  Today's date: 01/08/21  Number of days in the hospital: 4    SUBJECTIVE   Admitting Diagnosis: <principal problem not specified>  CC: Seizures/status epilepticus  Pt examined at bedside. Chart & results reviewed. Vitals stable, afebrile  No new complaints overnight. WBC trending down  HB stable   Pleural fluid cultures growing E. Coli, diphtheroids. ID on board, currently on Meropenem.     ROS:  Constitutional:  negative for chills, fevers, sweats  Respiratory:  negative for cough, dyspnea on exertion, hemoptysis, shortness of breath, wheezing  Cardiovascular:  negative for chest pain, chest pressure/discomfort, lower extremity edema, palpitations  Gastrointestinal:  negative for abdominal pain, constipation, diarrhea, nausea, vomiting  Neurological:  negative for dizziness, headache  BRIEF HISTORY   49-year-old female with history of diabetes, GERD, metastatic ovarian cancer that is post bilateral salpingo-oophorectomy, hysterectomy who presented as a transfer from [facility] ER for status epilepticus.  Patient was found altered by family and upon EMS arrival she was noted to have a witnessed seizure and was given 4 mg of Versed without cessation of seizures. Herchel Ilia was an attempted nasal intubation which was unsuccessful and patient was transported to [facility] ED.  Patient was intubated and started on propofol.  Once propofol was started clinical seizure activity had resolved.  Patient was noted to have marked leukocytosis and broad-spectrum antibiotics with Vanco and Zosyn were started.  Patient was also given 5 L of LR for fluid resuscitation.    With regards to seizures, her initial diagnosis was in 2020 where she was noted to have an MRI concerning for PRES. She was discharged home on Keppra 1500 mg twice daily.     Patient also had a recent visit at [place] [address] for GI bleed where she  had splenic embolization and subsequent splenic abscess requiring drain placement.  Was noted to be on Invanz per records.     With regards to her ovarian cancer, she follows up with Dr. Rea Renee:   Patient arrived to [facility] ED and was noted to be agitated and biting her ET tube.  She was continued on propofol which was increased to 60 mics due to agitation. Patient was admitted to the neuro ICU.  She was continued on broad-spectrum antibiotics.  ID consult was obtained.  Antibiotics were switched to meropenem.  Preliminary culture with gram-negative rods.  General surgery was consulted with no acute surgical intervention at this time. Tolerated extubation and did well. Heme/onc on board for her metastatic ovarian ca.   Transferred to floors for further management.          OBJECTIVE     Vital Signs:  /77   Pulse 91   Temp 99.1 °F (37.3 °C) (Oral)   Resp 22   Ht 5' 6\" (1.676 m)   Wt 165 lb (74.8 kg)   SpO2 93%   BMI 26.63 kg/m²     Temp (24hrs), Av.7 °F (37.1 °C), Min:98.3 °F (36.8 °C), Max:99.1 °F (37.3 °C)    In: 617   Out: 30 [Drains:30]    Physical Exam:  General appearance - alert, in no distress  Mental status - alert, oriented to person, place, and time  Eyes - pupils equal and reactive, extraocular eye movements intact  Mouth - mucous membranes moist, pharynx normal without lesions  Neck - supple, no significant adenopathy  Chest - clear to auscultation, no wheezes  Heart - normal rate, regular rhythm, normal S1, S2  Abdomen - soft, nontender, nondistended  Neurological - alert, oriented, normal speech, no focal deficits   Extremities - peripheral pulses normal, no pedal edema Skin - normal coloration and turgor, no rashes    Medications:  Scheduled Medications:    pantoprazole  40 mg Oral BID AC    enoxaparin  40 mg Subcutaneous Daily    potassium bicarb-citric acid  40 mEq Oral Daily    meropenem  1 g Intravenous Q8H    sodium chloride flush  10 mL Intravenous 2 times per day    ferrous sulfate  325 mg Oral Daily with breakfast    sertraline  50 mg Oral Daily    atorvastatin  20 mg Oral Daily    levetiracetam  1,500 mg Intravenous Q12H     Continuous Infusions:    sodium chloride 100 mL/hr at 01/06/21 0750     PRN Medications    sodium chloride flush, 10 mL, PRN      sodium chloride flush, 10 mL, PRN      promethazine, 12.5 mg, Q6H PRN    Or      ondansetron, 4 mg, Q6H PRN      polyethylene glycol, 17 g, Daily PRN      acetaminophen, 650 mg, Q6H PRN    Or      acetaminophen, 650 mg, Q6H PRN      HYDROcodone-acetaminophen, 1 tablet, Q6H PRN        Diagnostic Labs:  CBC:   Recent Labs     01/06/21 0408 01/07/21  0325 01/08/21  0514   WBC 14.9* 14.3* 12.5*   RBC 4.42 4.47 4.41   HGB 10.1* 10.3* 10.2*   HCT 34.8* 36.2* 35.8*   MCV 78.7* 81.0* 81.2*   RDW 25.2* 25.4* 25.7*   * 768* 738*     BMP:   Recent Labs     01/06/21 0408 01/07/21  0325 01/08/21  0514    143 137   K 3.3* 3.3* 3.9   * 107 103   CO2 25 24 24   BUN 3* 4* 4*   CREATININE 0.31* 0.31* 0.25*     BNP: No results for input(s): BNP in the last 72 hours. PT/INR: No results for input(s): PROTIME, INR in the last 72 hours. APTT:   Recent Labs     01/06/21 2037 01/07/21 0325 01/07/21  1035   APTT 23.7 24.5 25.5     CARDIAC ENZYMES: No results for input(s): CKMB, CKMBINDEX, TROPONINI in the last 72 hours.     Invalid input(s): CKTOTAL;3  FASTING LIPID PANEL:No results found for: CHOL, HDL, TRIG  LIVER PROFILE:   Recent Labs     01/06/21  0408 01/07/21  0325 01/08/21  0514   AST 17 12 10   ALT 5 5 <5*   BILIDIR 0.08 0.09 0.09   BILITOT <0.10* 0.25* 0.23*   ALKPHOS 82 83 77      MICROBIOLOGY: Lab Results   Component Value Date/Time    CULTURE NO GROWTH 3 DAYS 01/05/2021 08:45 AM    CULTURE NO GROWTH 3 DAYS 01/05/2021 08:45 AM       Imaging:    Ct Head Wo Contrast    Result Date: 1/4/2021  No acute intracranial hemorrhage, mass-effect, midline shift, or abnormal extra-axial collection. In the setting of known malignant neoplasm with seizure, contrast-enhanced brain MRI is recommended to further evaluate for intracranial metastatic disease. Minimal regions of low attenuation within the periventricular, subcortical, and deep white matter, presumably sequelae of chronic microangiopathic ischemic white matter change, though ultimately age indeterminate without prior comparison imaging. Recommend attention on follow-up. Heterogeneous mineralization is suggested within the calvarium, clivus, and partially imaged upper cervical spine, suspicious for osseous metastatic disease in the setting of a known primary neoplasm. Attention on follow-up MRI. Air-fluid levels with aerated secretions in the sphenoid sinuses and ethmoid air cells, which may relate to intubated status versus underlying pre-existing acute sinusitis. Xr Chest Portable    Result Date: 1/7/2021  Support lines, as detailed Interval progression in now moderate vascular congestion Stable moderate left basilar opacity related to combined pleural-parenchymal process with slight progression in mild streaky right basilar atelectasis     Xr Chest Portable    Result Date: 1/6/2021  Overall stable examination. Cardiomegaly and pulmonary vascular congestion. Small left pleural effusion versus pleural scarring.      Xr Chest Portable    Result Date: 1/5/2021  Stable chest Stable support lines Possible mild edema with moderate left perihilar, left basilar opacity related to combined pleuroparenchymal process     Xr Chest Portable    Result Date: 1/4/2021

## 2021-01-08 NOTE — PROGRESS NOTES
[facility]   Progress Note                   Date:   1/8/2021  [facility]: [facility]  Date of admission:  1/4/2021  4:09 PM  MRN:   [id number]  YOB: 1965  PCP: No primary care provider on file. Reason for Admission: Status epilepticus ([address] [G40.901]    Subjective:       Clinical Changes / Abnormalities: Pt seen and examined in the room, moved to step-down. NO acute CV issues/concerns overnight. Labs, vitals, & tele reviewed. Denies CP or SOB. Other consult notes appreciated. Medications:   Scheduled Meds:   pantoprazole  40 mg Oral BID AC    enoxaparin  40 mg Subcutaneous Daily    potassium bicarb-citric acid  40 mEq Oral Daily    meropenem  1 g Intravenous Q8H    sodium chloride flush  10 mL Intravenous 2 times per day    ferrous sulfate  325 mg Oral Daily with breakfast    sertraline  50 mg Oral Daily    atorvastatin  20 mg Oral Daily    levetiracetam  1,500 mg Intravenous Q12H     Continuous Infusions:   sodium chloride 100 mL/hr at 01/06/21 0750     CBC:   Recent Labs     01/06/21  0408 01/07/21 0325 01/08/21  0514   WBC 14.9* 14.3* 12.5*   HGB 10.1* 10.3* 10.2*   * 768* 738*     BMP:    Recent Labs     01/06/21  0408 01/07/21  0325 01/08/21  0514    143 137   K 3.3* 3.3* 3.9   * 107 103   CO2 25 24 24   BUN 3* 4* 4*   CREATININE 0.31* 0.31* 0.25*   GLUCOSE 104* 94 125*     Hepatic:   Recent Labs     01/06/21  0408 01/07/21  0325 01/08/21  0514   AST 17 12 10   ALT 5 5 <5*   BILITOT <0.10* 0.25* 0.23*   ALKPHOS 82 83 77     Troponin:   No results for input(s): TROPHS in the last 72 hours. BNP: No results for input(s): BNP in the last 72 hours. Lipids: No results for input(s): CHOL, HDL in the last 72 hours. Invalid input(s): LDLCALCU  INR: No results for input(s): INR in the last 72 hours.     Objective: Vitals: /78   Pulse 97   Temp 98.7 °F (37.1 °C) (Oral)   Resp 22   Ht 5' 6\" (1.676 m)   Wt 165 lb (74.8 kg)   SpO2 92%   BMI 26.63 kg/m²   General appearance: intubated and sedated   HEENT: Head: Normocephalic, no lesions, without obvious abnormality. Neck: no JVD, trachea midline, no adenopathy  Lungs: Clear to auscultation throughout, mild anterior rhonchi. No rales. NC oxygen    Heart: Regular rate and rhythm, s1/s2 auscultated, no murmurs. SR/ST  Abdomen: soft, non-tender, bowel sounds active  Extremities: no edema  Neurologic: not done    EKG:    Date: 01/05/21  Reading: This tachycardia, inverted P waves in V1     LAST ECHO:  Date: 8/20  Findings Summary: Normal LV size, normal systolic function. LVEF 60 to 65%. No regional wall motion abnormalities. Mild concentric LVH. No valvular regurgitation or stenosis. Echo 1/6/21  Summary  Left ventricle is normal in size. Global left ventricular systolic function is mild to moderately reduced. Estimated ejection fraction is 40 % . The apex and apical segments appear severely hypokinetic. The basal segments  appear to be functioning normal.  Abnormal global L. strain of -11 %. Mild left ventricular hypertrophy. Grade I (mild) left ventricular diastolic dysfunction. Mild aortic insufficiency. Trivial mitral regurgitation. Trivial tricuspid regurgitation. Small to mild circumferential pericardial effusion, with no  echocardiographic tamponade. Assessment / Acute Cardiac Problems:   1. NSTEMI    2. New onset seizure  3. Stage 4 ovarian CA   4. Splenic abscess on IV ATB  5. Hypokalemia  6. Hypomagnesium  7.  CMP with LVEF on recent echo down to 40% with significant WMA    Patient Active Problem List:     Malignant neoplasm of ovary (HCC)     Seizure ([address]     Type 2 diabetes mellitus without complication, without long-term current use of insulin (HCC)     Anemia     Splenic lesion     Ovarian cancer ([address]     Acute encephalopathy PRES (posterior reversible encephalopathy syndrome)     Hemianopia, bitemporal     Encephalopathy     Status epilepticus ([facility] [facility]     History of malignant neoplasm of ovary      Plan of Treatment:   1. NSTEMI. Stable and without chest pain or SOB presently. Given reduced LVEF and new WMA will recommend cardiac cath for further evaluation. Discussed in detail with patient risk/benefit along with her newly identified cancer diagnosis. Questions/concerns addressed. She wishes to proceed \"if that is best for me. \" Will discuss with attending and will need neurology and ID clearance for possible cath on Monday. 2. Emotional support provided  3. Continue PO statin. Will add low dose BB & consider ACE in AM if BP stable. 4. Keep k> 4 and Mag > 2 . Replace Mg today.     Electronically signed by ESTRELLITA Benitez CNP on 1/8/2021 at 11:55 [address] [facility].  [phone]

## 2021-01-09 LAB
ABSOLUTE EOS #: 0.47 K/UL (ref 0–0.44)
ABSOLUTE IMMATURE GRANULOCYTE: 0.06 K/UL (ref 0–0.3)
ABSOLUTE LYMPH #: 2.31 K/UL (ref 1.1–3.7)
ABSOLUTE MONO #: 0.9 K/UL (ref 0.1–1.2)
ALBUMIN SERPL-MCNC: 2.6 G/DL (ref 3.5–5.2)
ALBUMIN/GLOBULIN RATIO: 0.8 (ref 1–2.5)
ALP BLD-CCNC: 77 U/L (ref 35–104)
ALT SERPL-CCNC: <5 U/L (ref 5–33)
ANION GAP SERPL CALCULATED.3IONS-SCNC: 10 MMOL/L (ref 9–17)
AST SERPL-CCNC: 9 U/L
BASOPHILS # BLD: 1 % (ref 0–2)
BASOPHILS ABSOLUTE: 0.11 K/UL (ref 0–0.2)
BILIRUB SERPL-MCNC: 0.18 MG/DL (ref 0.3–1.2)
BILIRUBIN DIRECT: <0.08 MG/DL
BILIRUBIN, INDIRECT: ABNORMAL MG/DL (ref 0–1)
BUN BLDV-MCNC: 8 MG/DL (ref 6–20)
BUN/CREAT BLD: ABNORMAL (ref 9–20)
CALCIUM IONIZED: 1.1 MMOL/L (ref 1.13–1.33)
CALCIUM SERPL-MCNC: 8.6 MG/DL (ref 8.6–10.4)
CHLORIDE BLD-SCNC: 103 MMOL/L (ref 98–107)
CO2: 27 MMOL/L (ref 20–31)
CREAT SERPL-MCNC: 0.26 MG/DL (ref 0.5–0.9)
DIFFERENTIAL TYPE: ABNORMAL
EOSINOPHILS RELATIVE PERCENT: 5 % (ref 1–4)
GFR AFRICAN AMERICAN: >60 ML/MIN
GFR NON-AFRICAN AMERICAN: >60 ML/MIN
GFR SERPL CREATININE-BSD FRML MDRD: ABNORMAL ML/MIN/{1.73_M2}
GFR SERPL CREATININE-BSD FRML MDRD: ABNORMAL ML/MIN/{1.73_M2}
GLOBULIN: ABNORMAL G/DL (ref 1.5–3.8)
GLUCOSE BLD-MCNC: 110 MG/DL (ref 70–99)
HCT VFR BLD CALC: 37.2 % (ref 36.3–47.1)
HEMOGLOBIN: 10.6 G/DL (ref 11.9–15.1)
IMMATURE GRANULOCYTES: 1 %
LYMPHOCYTES # BLD: 24 % (ref 24–43)
MAGNESIUM: 1.6 MG/DL (ref 1.6–2.6)
MCH RBC QN AUTO: 22.8 PG (ref 25.2–33.5)
MCHC RBC AUTO-ENTMCNC: 28.5 G/DL (ref 28.4–34.8)
MCV RBC AUTO: 80.2 FL (ref 82.6–102.9)
MONOCYTES # BLD: 9 % (ref 3–12)
NRBC AUTOMATED: 0 PER 100 WBC
PDW BLD-RTO: 26 % (ref 11.8–14.4)
PLATELET # BLD: 712 K/UL (ref 138–453)
PLATELET ESTIMATE: ABNORMAL
PMV BLD AUTO: 8.6 FL (ref 8.1–13.5)
POTASSIUM SERPL-SCNC: 3.6 MMOL/L (ref 3.7–5.3)
RBC # BLD: 4.64 M/UL (ref 3.95–5.11)
RBC # BLD: ABNORMAL 10*6/UL
SEG NEUTROPHILS: 61 % (ref 36–65)
SEGMENTED NEUTROPHILS ABSOLUTE COUNT: 5.98 K/UL (ref 1.5–8.1)
SODIUM BLD-SCNC: 140 MMOL/L (ref 135–144)
TOTAL PROTEIN: 5.8 G/DL (ref 6.4–8.3)
WBC # BLD: 9.8 K/UL (ref 3.5–11.3)
WBC # BLD: ABNORMAL 10*3/UL

## 2021-01-09 PROCEDURE — 2060000000 HC ICU INTERMEDIATE R&B

## 2021-01-09 PROCEDURE — 80076 HEPATIC FUNCTION PANEL: CPT

## 2021-01-09 PROCEDURE — 80048 BASIC METABOLIC PNL TOTAL CA: CPT

## 2021-01-09 PROCEDURE — 83036 HEMOGLOBIN GLYCOSYLATED A1C: CPT

## 2021-01-09 PROCEDURE — 6360000002 HC RX W HCPCS: Performed by: STUDENT IN AN ORGANIZED HEALTH CARE EDUCATION/TRAINING PROGRAM

## 2021-01-09 PROCEDURE — 6370000000 HC RX 637 (ALT 250 FOR IP): Performed by: STUDENT IN AN ORGANIZED HEALTH CARE EDUCATION/TRAINING PROGRAM

## 2021-01-09 PROCEDURE — 2580000003 HC RX 258: Performed by: NURSE PRACTITIONER

## 2021-01-09 PROCEDURE — 99232 SBSQ HOSP IP/OBS MODERATE 35: CPT | Performed by: INTERNAL MEDICINE

## 2021-01-09 PROCEDURE — 6360000002 HC RX W HCPCS: Performed by: NURSE PRACTITIONER

## 2021-01-09 PROCEDURE — 36415 COLL VENOUS BLD VENIPUNCTURE: CPT

## 2021-01-09 PROCEDURE — 6370000000 HC RX 637 (ALT 250 FOR IP): Performed by: NURSE PRACTITIONER

## 2021-01-09 PROCEDURE — 85025 COMPLETE CBC W/AUTO DIFF WBC: CPT

## 2021-01-09 PROCEDURE — 99233 SBSQ HOSP IP/OBS HIGH 50: CPT | Performed by: INTERNAL MEDICINE

## 2021-01-09 PROCEDURE — 82330 ASSAY OF CALCIUM: CPT

## 2021-01-09 PROCEDURE — 97530 THERAPEUTIC ACTIVITIES: CPT

## 2021-01-09 PROCEDURE — 97162 PT EVAL MOD COMPLEX 30 MIN: CPT

## 2021-01-09 PROCEDURE — 2580000003 HC RX 258: Performed by: INTERNAL MEDICINE

## 2021-01-09 PROCEDURE — 83735 ASSAY OF MAGNESIUM: CPT

## 2021-01-09 PROCEDURE — 6360000002 HC RX W HCPCS: Performed by: INTERNAL MEDICINE

## 2021-01-09 RX ORDER — LANOLIN ALCOHOL/MO/W.PET/CERES
3 CREAM (GRAM) TOPICAL NIGHTLY PRN
Status: DISCONTINUED | OUTPATIENT
Start: 2021-01-09 | End: 2021-01-14 | Stop reason: HOSPADM

## 2021-01-09 RX ORDER — FENTANYL CITRATE 50 UG/ML
25 INJECTION, SOLUTION INTRAMUSCULAR; INTRAVENOUS ONCE
Status: COMPLETED | OUTPATIENT
Start: 2021-01-09 | End: 2021-01-09

## 2021-01-09 RX ORDER — LANOLIN ALCOHOL/MO/W.PET/CERES
3 CREAM (GRAM) TOPICAL NIGHTLY PRN
Status: DISCONTINUED | OUTPATIENT
Start: 2021-01-10 | End: 2021-01-09 | Stop reason: CLARIF

## 2021-01-09 RX ORDER — LISINOPRIL 2.5 MG/1
2.5 TABLET ORAL DAILY
Status: DISCONTINUED | OUTPATIENT
Start: 2021-01-09 | End: 2021-01-14 | Stop reason: HOSPADM

## 2021-01-09 RX ADMIN — MEROPENEM 1 G: 1 INJECTION, POWDER, FOR SOLUTION INTRAVENOUS at 08:13

## 2021-01-09 RX ADMIN — FENTANYL CITRATE 25 MCG: 50 INJECTION, SOLUTION INTRAMUSCULAR; INTRAVENOUS at 21:33

## 2021-01-09 RX ADMIN — LEVETIRACETAM 1500 MG: 15 INJECTION INTRAVENOUS at 21:00

## 2021-01-09 RX ADMIN — LEVETIRACETAM 1500 MG: 15 INJECTION INTRAVENOUS at 10:54

## 2021-01-09 RX ADMIN — MEROPENEM 1 G: 1 INJECTION, POWDER, FOR SOLUTION INTRAVENOUS at 16:03

## 2021-01-09 RX ADMIN — METOPROLOL SUCCINATE 25 MG: 25 TABLET, FILM COATED, EXTENDED RELEASE ORAL at 08:17

## 2021-01-09 RX ADMIN — FERROUS SULFATE TAB EC 325 MG (65 MG FE EQUIVALENT) 325 MG: 325 (65 FE) TABLET DELAYED RESPONSE at 08:14

## 2021-01-09 RX ADMIN — SODIUM CHLORIDE, PRESERVATIVE FREE 10 ML: 5 INJECTION INTRAVENOUS at 08:18

## 2021-01-09 RX ADMIN — HYDROCODONE BITARTRATE AND ACETAMINOPHEN 1 TABLET: 5; 325 TABLET ORAL at 02:55

## 2021-01-09 RX ADMIN — ATORVASTATIN CALCIUM 20 MG: 20 TABLET, FILM COATED ORAL at 08:17

## 2021-01-09 RX ADMIN — HYDROCODONE BITARTRATE AND ACETAMINOPHEN 1 TABLET: 5; 325 TABLET ORAL at 08:29

## 2021-01-09 RX ADMIN — MAGNESIUM SULFATE HEPTAHYDRATE 1 G: 1 INJECTION, SOLUTION INTRAVENOUS at 14:15

## 2021-01-09 RX ADMIN — MAGNESIUM SULFATE HEPTAHYDRATE 1 G: 1 INJECTION, SOLUTION INTRAVENOUS at 13:00

## 2021-01-09 RX ADMIN — FENTANYL CITRATE 25 MCG: 50 INJECTION, SOLUTION INTRAMUSCULAR; INTRAVENOUS at 11:09

## 2021-01-09 RX ADMIN — HYDROCODONE BITARTRATE AND ACETAMINOPHEN 1 TABLET: 5; 325 TABLET ORAL at 13:16

## 2021-01-09 RX ADMIN — ASPIRIN 81 MG: 81 TABLET, CHEWABLE ORAL at 08:16

## 2021-01-09 RX ADMIN — LISINOPRIL 2.5 MG: 2.5 TABLET ORAL at 10:54

## 2021-01-09 RX ADMIN — SERTRALINE 50 MG: 50 TABLET, FILM COATED ORAL at 08:17

## 2021-01-09 RX ADMIN — POTASSIUM BICARBONATE 40 MEQ: 782 TABLET, EFFERVESCENT ORAL at 08:17

## 2021-01-09 RX ADMIN — Medication 3 MG: at 22:26

## 2021-01-09 RX ADMIN — PANTOPRAZOLE SODIUM 40 MG: 40 TABLET, DELAYED RELEASE ORAL at 08:09

## 2021-01-09 RX ADMIN — ENOXAPARIN SODIUM 40 MG: 40 INJECTION SUBCUTANEOUS at 08:17

## 2021-01-09 RX ADMIN — MEROPENEM 1 G: 1 INJECTION, POWDER, FOR SOLUTION INTRAVENOUS at 00:24

## 2021-01-09 RX ADMIN — PANTOPRAZOLE SODIUM 40 MG: 40 TABLET, DELAYED RELEASE ORAL at 16:03

## 2021-01-09 ASSESSMENT — ENCOUNTER SYMPTOMS
CHOKING: 0
COLOR CHANGE: 0
APNEA: 0
COUGH: 0
ABDOMINAL DISTENTION: 0
EYE DISCHARGE: 0

## 2021-01-09 ASSESSMENT — PAIN SCALES - GENERAL
PAINLEVEL_OUTOF10: 7
PAINLEVEL_OUTOF10: 5
PAINLEVEL_OUTOF10: 10
PAINLEVEL_OUTOF10: 7
PAINLEVEL_OUTOF10: 7
PAINLEVEL_OUTOF10: 5

## 2021-01-09 ASSESSMENT — PAIN DESCRIPTION - LOCATION
LOCATION: BACK;BUTTOCKS
LOCATION: BUTTOCKS
LOCATION: ABDOMEN;BACK;BUTTOCKS

## 2021-01-09 ASSESSMENT — PAIN - FUNCTIONAL ASSESSMENT
PAIN_FUNCTIONAL_ASSESSMENT: PREVENTS OR INTERFERES SOME ACTIVE ACTIVITIES AND ADLS
PAIN_FUNCTIONAL_ASSESSMENT: PREVENTS OR INTERFERES WITH ALL ACTIVE AND SOME PASSIVE ACTIVITIES

## 2021-01-09 ASSESSMENT — PAIN DESCRIPTION - PROGRESSION
CLINICAL_PROGRESSION: GRADUALLY WORSENING

## 2021-01-09 ASSESSMENT — PAIN DESCRIPTION - FREQUENCY: FREQUENCY: CONTINUOUS

## 2021-01-09 ASSESSMENT — PAIN DESCRIPTION - PAIN TYPE: TYPE: ACUTE PAIN;CHRONIC PAIN

## 2021-01-09 ASSESSMENT — PAIN DESCRIPTION - DESCRIPTORS
DESCRIPTORS: ACHING;DISCOMFORT
DESCRIPTORS: ACHING;CONSTANT

## 2021-01-09 NOTE — PROGRESS NOTES
[facility]   Progress Note                   Date:   1/9/2021  Patient name: Mary [facility]  Date of admission:  1/4/2021  4:09 PM  MRN:   [id number]  YOB: 1965  PCP: No primary care provider on file. Reason for Admission: Status epilepticus ([address] [G40.901]    Subjective:       Clinical Changes / Abnormalities: Patient seen and examined in room after discussion with RN. Denies chest pain or SOB. No CV concerns overnight. SR on tele HR 89    Medications:   Scheduled Meds:   lisinopril  2.5 mg Oral Daily    metoprolol succinate  25 mg Oral Daily    aspirin  81 mg Oral Daily    pantoprazole  40 mg Oral BID AC    enoxaparin  40 mg Subcutaneous Daily    potassium bicarb-citric acid  40 mEq Oral Daily    meropenem  1 g Intravenous Q8H    sodium chloride flush  10 mL Intravenous 2 times per day    ferrous sulfate  325 mg Oral Daily with breakfast    sertraline  50 mg Oral Daily    atorvastatin  20 mg Oral Daily    levetiracetam  1,500 mg Intravenous Q12H     Continuous Infusions:   sodium chloride 100 mL/hr at 01/06/21 0750     CBC:   Recent Labs     01/07/21  0325 01/08/21  0514 01/09/21  0630   WBC 14.3* 12.5* 9.8   HGB 10.3* 10.2* 10.6*   * 738* 712*     BMP:    Recent Labs     01/07/21  0325 01/08/21  0514 01/09/21  0630    137 140   K 3.3* 3.9 3.6*    103 103   CO2 24 24 27   BUN 4* 4* 8   CREATININE 0.31* 0.25* 0.26*   GLUCOSE 94 125* 110*     Hepatic:   Recent Labs     01/07/21  0325 01/08/21  0514 01/09/21  0630   AST 12 10 9   ALT 5 <5* <5*   BILITOT 0.25* 0.23* 0.18*   ALKPHOS 83 77 77     Troponin:   No results for input(s): TROPHS in the last 72 hours. BNP: No results for input(s): BNP in the last 72 hours. Lipids: No results for input(s): CHOL, HDL in the last 72 hours. Invalid input(s): LDLCALCU  INR: No results for input(s): INR in the last 72 hours.     Objective: Vitals: BP 97/67   Pulse 67   Temp 97.6 °F (36.4 °C) (Oral)   Resp 17   Ht 5' 6\" (1.676 m)   Wt 166 lb 7.2 oz (75.5 kg)   SpO2 97%   BMI 26.87 kg/m²   General appearance: intubated and sedated   HEENT: Head: Normocephalic, no lesions, without obvious abnormality. Neck: no JVD, trachea midline, no adenopathy  Lungs: Clear to auscultation throughout, mild anterior rhonchi. No rales. NC oxygen    Heart: Regular rate and rhythm, s1/s2 auscultated, no murmurs. SR/ST  Abdomen: soft, non-tender, bowel sounds active  Extremities: no edema  Neurologic: not done    EKG:    Date: 01/05/21  Reading: This tachycardia, inverted P waves in V1     LAST ECHO:  Date: 8/20  Findings Summary: Normal LV size, normal systolic function. LVEF 60 to 65%. No regional wall motion abnormalities. Mild concentric LVH. No valvular regurgitation or stenosis. Echo 1/6/21  Summary  Left ventricle is normal in size. Global left ventricular systolic function is mild to moderately reduced. Estimated ejection fraction is 40 % . The apex and apical segments appear severely hypokinetic. The basal segments  appear to be functioning normal.  Abnormal global L. strain of -11 %. Mild left ventricular hypertrophy. Grade I (mild) left ventricular diastolic dysfunction. Mild aortic insufficiency. Trivial mitral regurgitation. Trivial tricuspid regurgitation. Small to mild circumferential pericardial effusion, with no  echocardiographic tamponade. Assessment / Acute Cardiac Problems:   1. NSTEMI    2. New onset seizure  3. Stage 4 ovarian CA   4. Splenic abscess on IV ATB  5. Hypokalemia  6. Hypomagnesium  7.  CMP with LVEF on recent echo down to 40% with significant WMA    Patient Active Problem List:     Malignant neoplasm of ovary (HCC)     Seizure ([address]     Type 2 diabetes mellitus without complication, without long-term current use of insulin (HCC)     Anemia     Splenic lesion     Ovarian cancer ([address]     Acute encephalopathy PRES (posterior reversible encephalopathy syndrome)     Hemianopia, bitemporal     Encephalopathy     Status epilepticus ([facility]     History of malignant neoplasm of ovary      Plan of Treatment:   1. NSTEMI. Stable and without chest pain or SOB presently. Given reduced LVEF and new WMA will recommend cardiac cath for further evaluation. Will discuss with attending and will need neurology and ID clearance for possible cath on Monday. 2. Emotional support provided  3. Continue PO ASA, statin, low dose BB & ACE    4. Keep K > 4 and Mag > 2 . Replace K and Mg today.     Electronically signed by ESTRELLITA Kitchen NP on 1/9/2021 at 12:48 PM  [address]  [phone]

## 2021-01-09 NOTE — PROGRESS NOTES
[facility] -   Infectious diseases evaluation  admission date 1/4/2021    reason for consultation:   leukocytosis    Impression :   Current:  · splenic abscess  - drained 12/2020  · Post spleen embolization for mass, 10/2020 [facility]L - E coli bacteroides streptococcus  · STV cx E coli  · ovarian metastatic cancer  · 1/5 - SZ and status epilepticus  · intubated  · bandemia  · Rash on ceftriaxone, [facility]  Discussion / summary of stay / plan of care   ·   Recommendations   · Keep meropenem for spleen abscess to cover org of STL and the current  Coli  · Anticipate DC on invanz 1 g daily x 30 days  · Repeat US spleen at 2 weeks to ck if cleared  · Had rash to ceftriaxone  · She remains full code, oncology on board  · Chart reconsiled    Infection Control Recommendations   · [place] Precautions  · Contact Isolation     Antimicrobial Stewardship Recommendations   · Simplification of therapy  · Targeted therapy    Coordination ofOutpatient Care:   · Estimated Length of IV antimicrobials:  · Patient will need Midline / picc Catheter Insertion:   · Patient will need SNF:  · Patient will need outpatient wound care:     History of Present Illness:   Initial history:  Gama Arevalo is a 60y.o.-year-old female w ovarian CA came w status epilepticus, found down w SZ, intubated. Hx ovarian CA w mets to abd and lungs,  Recent GI bleed STL 10/2020, post embolization of the spleen for splenic mass. 12/2020 admitted w left splenic abscess and drained and sent recently on invanz [aspirate showed E. coli sensitive to ceftriaxone, Bacteroides, strep viridans. When the patient was given ceftriaxone he had a rash and allergy reaction, hence it was stopped and he was sent on Invanz]  - left pleural fluid aspirated was then neg-    At admission to [facility],  found w tarry fluid from the NGT and clots in ETT. possibly from nasal attempted intubation - cleared alter  On- Still full code. Left spleen drainage purulent. And now cx w GNR  Leukocytosis noticed, we are called for opinion. 1/5 left perisplenic fluid aspirated and grew GNR    Interval changes  1/8/2021   Patient Vitals for the past 8 hrs:   BP Temp Temp src Pulse Resp SpO2   01/08/21 2347 124/73 98.1 °F (36.7 °C) Oral 101 16 96 %   01/08/21 2000    99     01/08/21 1942 125/76 98.2 °F (36.8 °C) Oral 101 16 95 %   01/08/21 1724 132/78 98.7 °F (37.1 °C) Oral 105 23 100 %     Extubated 1/7  abd soft and lungs clear  Alert appropriate  Abd soft non tender    Drain with purulence, E coli multiS and diphteroids  Tolerating Meropenem  White count is improving  Oncology on board    Summary of relevant labs:  Labs:  WBC 32 -14- 12  Micro:  BC 1/5 x 2  U cx 1/5   Perisplenic fluid  1/5/21  · cx 1/5 GNR,   · LDH 03814  · RBC 82474  · WBC 985041    1/4 sputum negative for malignancy    Imaging:  CT AP 1/5  Interval placement of a percutaneous pigtail catheter left splenic complex   mass. Mesenteric, retroperitoneal, and left inguinal pathologic lymphadenopathy   consistent with metastatic disease. Multiple pulmonary nodules consistent with metastatic disease.  Prominent   right hilar lymph node. New left lower lobe consolidation and effusion. Diffuse bony blastic metastatic disease. MRI brain   Impression:  1. No acute intracranial abnormality. Specifically, no acute infarction, mesial temporal sclerosis, or intracranial metastatic disease. 2. Mild parenchymal volume loss. Sequela of mild chronic microvascular   ischemic changes. CXR 1/5/21  Left basilar opacity and left perihilar infiukltrate        I have personally reviewed the past medical history, past surgical history, medications, social history, and family history, and I haveupdated the database accordingly.       Allergies:   Ceftriaxone     Review of Systems:     Review of Systems Constitutional: Positive for activity change. Negative for appetite change. HENT: Negative for congestion. Eyes: Negative for discharge. Respiratory: Negative for apnea, cough and choking. Cardiovascular: Negative for chest pain. Gastrointestinal: Negative for abdominal distention. Endocrine: Negative for heat intolerance. Genitourinary: Negative for dysuria. Musculoskeletal: Negative for arthralgias. Skin: Negative for color change. Allergic/Immunologic: Negative for immunocompromised state. Neurological: Negative for dizziness. Hematological: Negative for adenopathy. Psychiatric/Behavioral: Negative for agitation. Physical Examination :       Physical Exam  Constitutional:       General: She is not in acute distress. Appearance: Normal appearance. She is not ill-appearing or toxic-appearing. HENT:      Head: Normocephalic and atraumatic. Nose: Nose normal.      Mouth/Throat:      Mouth: Mucous membranes are moist.   Eyes:      Conjunctiva/sclera: Conjunctivae normal.      Pupils: Pupils are equal, round, and reactive to light. Neck:      Musculoskeletal: No muscular tenderness. Cardiovascular:      Rate and Rhythm: Normal rate and regular rhythm. Heart sounds: Normal heart sounds. No murmur. Pulmonary:      Effort: No respiratory distress. Breath sounds: Normal breath sounds. Abdominal:      General: There is no distension. Palpations: Abdomen is soft. Comments: Left UQ  drain w pus   Genitourinary:     Comments: Urina cristiano  Musculoskeletal:         General: No swelling. Right lower leg: No edema. Left lower leg: No edema. Skin:     Coloration: Skin is not jaundiced or pale. Findings: No bruising, lesion or rash. Neurological:      General: No focal deficit present. Mental Status: She is alert and oriented to person, place, and time.    Psychiatric:         Mood and Affect: Mood normal.  atorvastatin  20 mg Oral Daily    levetiracetam  1,500 mg Intravenous Q12H       Social History:     Social History     Socioeconomic History    Marital status: Single     Spouse name: Not on file    Number of children: Not on file    Years of education: Not on file    Highest education level: Not on file   Occupational History    Not on file   Social Needs    Financial resource strain: Not on file    Food insecurity     Worry: Not on file     Inability: Not on file    Transportation needs     Medical: Not on file     Non-medical: Not on file   Tobacco Use    Smoking status: Current Every Day Smoker     Packs/day: 1.00    Smokeless tobacco: Current User   Substance and Sexual Activity    Alcohol use: Not Currently    Drug use: Never    Sexual activity: Not on file   Lifestyle    Physical activity     Days per week: Not on file     Minutes per session: Not on file    Stress: Not on file   Relationships    Social connections     Talks on phone: Not on file     Gets together: Not on file     Attends Christian service: Not on file     Active member of club or organization: Not on file     Attends meetings of clubs or organizations: Not on file     Relationship status: Not on file    Intimate partner violence     Fear of current or ex partner: Not on file     Emotionally abused: Not on file     Physically abused: Not on file     Forced sexual activity: Not on file   Other Topics Concern    Not on file   Social History Narrative    Not on file       Family History:     Family History   Problem Relation Age of Onset    Alcohol Abuse Mother    Jen Marcella Cirrhosis Mother       Medical Decision Making:   I have independently reviewed/ordered the following labs:    CBC with Differential:   Recent Labs     01/07/21  0325 01/08/21 0514   WBC 14.3* 12.5*   HGB 10.3* 10.2*   HCT 36.2* 35.8*   * 738*   LYMPHOPCT 14* 14*   MONOPCT 7 8     BMP:  Recent Labs     01/07/21  0325 01/08/21  0514    137 K 3.3* 3.9    103   CO2 24 24   BUN 4* 4*   CREATININE 0.31* 0.25*   MG 1.7 1.5*     Hepatic Function Panel:   Recent Labs     01/07/21  0325 01/08/21  0514   PROT 6.0* 5.8*   LABALBU 2.9* 2.6*   BILIDIR 0.09 0.09   IBILI 0.16 0.14   BILITOT 0.25* 0.23*   ALKPHOS 83 77   ALT 5 <5*   AST 12 10     No results for input(s): RPR in the last 72 hours. No results for input(s): HIV in the last 72 hours. No results for input(s): BC in the last 72 hours. Lab Results   Component Value Date    CREATININE 0.25 01/08/2021    GLUCOSE 125 01/08/2021       Detailed results: Thank you for allowing us to participate in the care of this patient. Please call with questions. This note is created with the assistance of a speech recognition program.  While intending to generate adocument that actually reflects the content of the visit, the document can still have some errors including those of syntax and sound a like substitutions which may escape proof reading. It such instances, actual meaningcan be extrapolated by contextual diversion.     Adriana Huang MD  Office: [phone]  Perfect serve / office [phone]

## 2021-01-09 NOTE — PROGRESS NOTES
Physical Therapy    Facility/Department: [facility] NEURO  Initial Assessment    NAME: Brook Salcedo  : 1965  MRN: [id number]    Date of Service: 2021    Discharge Recommendations:    Further therapy recommended at discharge. PT Equipment Recommendations  Equipment Needed: Yes  Mobility Devices: [place]: Rolling    Assessment   Body structures, Functions, Activity limitations: Decreased functional mobility ; Decreased posture;Decreased endurance;Decreased strength;Decreased balance; Increased pain  Assessment: Pt ambulated 3ft w/ RW CGA, pt's tolerance to functional mobility significantly limited due to pain this date. Pt would benefit from continued skilled physical therapy to address functional mobility deficits to return pt to prior level of independence. Prognosis: Good  Decision Making: Medium Complexity  PT Education: PT Role;Goals;Plan of Care  REQUIRES PT FOLLOW UP: Yes  Activity Tolerance  Activity Tolerance: Patient Tolerated treatment well       Patient Diagnosis(es): The primary encounter diagnosis was Seizure ([facility] [address] A diagnosis of Spleen, abscess was also pertinent to this visit. has a past medical history of Anemia, Bleeding, Cervical cancer ([facility] [address] Depression, Diabetes mellitus ([address] GERD (gastroesophageal reflux disease), Metastatic cancer ([address] Ovarian cancer ([facility] [address] Post chemo evaluation, and Splenic lesion. has a past surgical history that includes Hysterectomy, total abdominal; Port Surgery; Tonsillectomy; IR PORT PLACEMENT > 5 YEARS (2020); Anus surgery; Abscess Drainage (); colectomy (2013); and IR EMBOLIZATION HEMORRHAGE (10/05/2020).     Restrictions  Restrictions/Precautions  Restrictions/Precautions: Seizure, Fall Risk  Required Braces or Orthoses?: No  Position Activity Restriction  Other position/activity restrictions: up with assist; SBP <140; FMS  Vision/Hearing  Vision: Impaired  Vision Exceptions: Wears glasses at all times Hearing: Within functional limits     Subjective  General  Patient assessed for rehabilitation services?: Yes  Response To Previous Treatment: Not applicable  Family / Caregiver Present: No  Follows Commands: Within Functional Limits  Subjective  Subjective: RN and pt in agreement for PT eval; pt supine in bed upon PT arrival, pt very tearful throughout evaluation  Pain Screening  Patient Currently in Pain: Yes  Pain Assessment  Pain Assessment: 0-10  Pain Level: 7  Pain Type: Acute pain  Pain Location: Buttocks  Pain Descriptors: Discomfort  Non-Pharmaceutical Pain Intervention(s): Ambulation/Increased Activity;Repositioned  Response to Pain Intervention: Patient Satisfied  Multiple Pain Sites: No  Vital Signs  Patient Currently in Pain: Yes       Orientation  Orientation  Overall Orientation Status: Within Functional Limits  Social/Functional History  Social/Functional History  Lives With: Son(pt reports son is home most of the time)  Type of Home: Mobile home  Home Layout: One level  Home Access: Stairs to enter with rails  Entrance Stairs - Number of Steps: 3  Entrance Stairs - Rails: Left  Bathroom Shower/Tub: Tub/Shower unit  Bathroom Toilet: Standard  Bathroom Equipment: Shower chair  Home Equipment: (no DME use at baseline)  ADL Assistance: [address] [place] [address] Avenue: Independent  Homemaking Responsibilities: Yes(shared with son)  Ambulation Assistance: Independent  Transfer Assistance: Independent  Active : No  Patient's  Info: son  Occupation: On disability  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;  for pharmacy normally working full time  Leisure & Hobbies: pt enjoys painting  Additional Comments: Information obtained from OT assessment- pt became extremely tearful with assessment of social/ functional hx  Cognition   Cognition  Initiation: Requires cues for some  Sequencing: Requires cues for some    Objective  Joint Mobility  Spine: WFL  ROM RLE: WFL  ROM LLE: [facility] ROM RUE: WFL  ROM LUE: WFL  Strength RLE  Strength RLE: WFL  Strength LLE  Strength LLE: WFL  Strength RUE  Strength RUE: WFL  Strength LUE  Strength LUE: WFL     Sensation  Overall Sensation Status: WFL(pt denies numbness/tingling at this time)  Bed mobility  Supine to Sit: Contact guard assistance(Assist provided for trunk progression)  Sit to Supine: Minimal assistance(BLE progression)  Transfers  Sit to Stand: Contact guard assistance  Stand to sit: Contact guard assistance  Ambulation  Ambulation?: Yes  Ambulation 1  Surface: level tile  Device: Rolling Walker  Assistance: Contact guard assistance  Gait Deviations: Slow Cynthia; Shuffles  Distance: 3ft  Comments: Pt able to ambulate 3ft along to the [facility], pt unable to ambulate further due to reports pain due to FMS. RN aware.   Stairs/Curb  Stairs?: No     Balance  Posture: Good  Sitting - Static: Good;-  Sitting - Dynamic: Good;-  Standing - Static: Good;-  Standing - Dynamic: Fair;+  Comments: Standing balance assessed w/ RW; pt able to sit EOB SBA        Plan   Plan  Times per week: 5-6x/week  Current Treatment Recommendations: Strengthening, Transfer Training, Endurance Training, Patient/Caregiver Education & Training, ROM, Balance Training, Gait Training, Home Exercise Program, Functional Mobility Training, Stair training, Safety Education & Training  Safety Devices  Type of devices: Left in bed, Call light within reach, Gait belt, Nurse notified, Bed alarm in place, Patient at risk for falls  Restraints  Initially in place: Yes  Restraints: 4 bed rails and bed buddies    AM-PAC Score     AM-PAC Inpatient Mobility without Stair Climbing Raw Score : 17 (01/09/21 1314)  AM-PAC Inpatient without Stair Climbing T-Scale Score : 48.47 (01/09/21 1314)  Mobility Inpatient CMS 0-100% Score: 32.72 (01/09/21 1314)  Mobility Inpatient without Stair CMS G-Code Modifier : Quincy Drown (01/09/21 1314)       Goals  Short term goals  Time Frame for Short term goals: 14 Short term goal 1: Pt to perform bed mobility and functional transfers with supervision  Short term goal 2: Ambulate 150ft w/ RW SBA  Short term goal 3: Ascend/descend 3 stairs with L rail CGA  Short term goal 4: Demonstrate standing balane of good - to decrease fall risk  Patient Goals   Patient goals :  To go home       Therapy Time   Individual Concurrent Group Co-treatment   Time In 0920         Time Out 0945         Minutes 25         Timed Code Treatment Minutes: 23 Minutes       Mary Kate Reeder, PT

## 2021-01-09 NOTE — PROGRESS NOTES
[facility] -   Infectious diseases evaluation. Progress Note    admission date 1/4/2021    reason for consultation:   leukocytosis    Impression :   Current:  · Splenic abscess  - drained 12/2020  · Post spleen embolization for mass, 10/2020 STL - E coli bacteroides streptococcus  · STV cx E coli  · Ovarian metastatic cancer  · 1/5/21 - Seizures and status epilepticus  · intubated  · Bandemia  · Rash on ceftriaxone, [facility]  Discussion / summary of stay / plan of care   ·   Recommendations   · Keep meropenem for spleen abscess to cover org of STL and the current  E Coli  · Anticipate DC on invanz 1 g daily x 30 days  · Repeat US spleen at 2 weeks to ck if cleared  · Had rash with ceftriaxone  · She remains full code, oncology on board  · ID wise cleared for cardiac Cath at the discretion of Cardiology    Infection Control Recommendations   · Lyerly Precautions  · Contact Isolation     Antimicrobial Stewardship Recommendations   · Simplification of therapy  · Targeted therapy    Coordination ofOutpatient Care:   · Estimated Length of IV antimicrobials:  · Patient will need Midline / picc Catheter Insertion:   · Patient will need SNF:  · Patient will need outpatient wound care:     History of Present Illness:       INITIAL HISTORY:    Brook Salcedo is a 60y.o.-year-old female w ovarian CA who came w status epilepticus, found down w SZ, intubated. Hx ovarian CA w mets to abd and lungs,    Recent GI bleed STL 10/2020, post embolization of the spleen for splenic mass. 12/2020 admitted w left splenic abscess and drained and sent recently on invanz [aspirate showed E. coli sensitive to ceftriaxone, Bacteroides, strep viridans.   When the patient was given ceftriaxone he had a rash and allergy reaction, hence it was stopped and he was sent on Invanz]  - left pleural fluid aspirated was then neg- At admission to [facility],  found w tarry fluid from the NGT and clots in ETT. possibly from nasal attempted intubation - Resolved-  She is full code. Left spleen drainage purulent. E coli on culture  Leukocytosis noticed, we are called for opinion. 1/5 left perisplenic fluid aspirated and grew E coli    CURRENT EVALUATION :  1/9/2021   Patient Vitals for the past 8 hrs:   BP Temp Temp src Pulse Resp SpO2 Weight   01/09/21 1203 97/67 97.6 °F (36.4 °C) Oral 67 17 97 %    01/09/21 0737 129/80 98.7 °F (37.1 °C) Oral 92 19 95 %    01/09/21 0704       166 lb 7.2 oz (75.5 kg)       Afebrile  VS stable    Extubated 1/7  On room air  RR 17  O2 sat 97 %    Alert appropriate  Abd soft non tender    Drain with cloudy fluid: culture E coli multiS and diphteroids  Tolerating Meropenem  White count is improving  Oncology on board    Summary of relevant labs: 1/9/2021    Labs:  WBC 32 -14- 12-->9.8  Hb 10.6  Plat 712    Micro:  BC 1/5 x 2: No growth    U cx 1/5 : No growth     Perisplenic fluid  1/5/21  · cx 1/5 E coli  · LDH 87465  · RBC 79217  · WBC 729222    1/4 sputum negative for malignancy    Imaging:  CT AP 1/5  Interval placement of a percutaneous pigtail catheter left splenic complex   mass. Mesenteric, retroperitoneal, and left inguinal pathologic lymphadenopathy   consistent with metastatic disease. Multiple pulmonary nodules consistent with metastatic disease.  Prominent   right hilar lymph node. New left lower lobe consolidation and effusion. Diffuse bony blastic metastatic disease. MRI brain   Impression:  1. No acute intracranial abnormality. Specifically, no acute infarction, mesial temporal sclerosis, or intracranial metastatic disease. 2. Mild parenchymal volume loss. Sequela of mild chronic microvascular   ischemic changes.      CXR 1/5/21  Left basilar opacity and left perihilar infiukltrate I have personally reviewed the past medical history, past surgical history, medications, social history, and family history, and I haveupdated the database accordingly. Allergies:   Ceftriaxone     Review of Systems:     Review of Systems   Constitutional: Positive for activity change. Negative for appetite change. HENT: Negative for congestion. Eyes: Negative for discharge. Respiratory: Negative for apnea, cough and choking. Cardiovascular: Negative for chest pain. Gastrointestinal: Negative for abdominal distention. Endocrine: Negative for heat intolerance. Genitourinary: Negative for dysuria. Musculoskeletal: Negative for arthralgias. Skin: Negative for color change. Allergic/Immunologic: Negative for immunocompromised state. Neurological: Negative for dizziness. Hematological: Negative for adenopathy. Psychiatric/Behavioral: Negative for agitation. Physical Examination :       Physical Exam  Constitutional:       General: She is not in acute distress. Appearance: Normal appearance. She is not ill-appearing or toxic-appearing. HENT:      Head: Normocephalic and atraumatic. Nose: Nose normal.      Mouth/Throat:      Mouth: Mucous membranes are moist.   Eyes:      Conjunctiva/sclera: Conjunctivae normal.      Pupils: Pupils are equal, round, and reactive to light. Neck:      Musculoskeletal: No muscular tenderness. Cardiovascular:      Rate and Rhythm: Normal rate and regular rhythm. Heart sounds: Normal heart sounds. No murmur. Pulmonary:      Effort: No respiratory distress. Breath sounds: Normal breath sounds. Abdominal:      General: There is no distension. Palpations: Abdomen is soft. Comments: Left UQ  drain w pus   Genitourinary:     Comments: Urina cristiano  Musculoskeletal:         General: No swelling. Right lower leg: No edema. Left lower leg: No edema. Skin:     Coloration: Skin is not jaundiced or pale. Findings: No bruising, lesion or rash. Neurological:      General: No focal deficit present. Mental Status: She is alert and oriented to person, place, and time. Psychiatric:         Mood and Affect: Mood normal.         Behavior: Behavior normal.      Comments: Appropriate          Past Medical History:     Past Medical History:   Diagnosis Date    Anemia     Bleeding 10/2020    intra-abdominal bleeding -due to splenic mass with GI infiltration. Status post embolization    Cervical cancer ([facility] [address]     Depression     Diabetes mellitus ([facility] [address]     GERD (gastroesophageal reflux disease)     Metastatic cancer ([facility] [address] 10/2020    extensive intraabdominal and splenic involvement and lung mets.  Ovarian cancer ([facility] [address]     low grade serous ovarian carcinoma    Post chemo evaluation     2007: Chemo via med onc (Dr. Gina Renee), 2008: Jomar Amanda due to rising CA-125, 2013: intraperitoneal chemo,12/2015: Ca125 - 25     Splenic lesion        Past Surgical  History:     Past Surgical History:   Procedure Laterality Date    ABSCESS DRAINAGE  2013    Franca rectal    ANUS SURGERY      ANAL FISSURECTOMY    COLECTOMY  03/2013    ex lap, tumor debulking, transverse colectomy w reanastamosis, subgastric omentectomy, intraperitoneal port placement    HYSTERECTOMY, TOTAL ABDOMINAL      IR EMBOLIZATION HEMORRHAGE  10/05/2020    intra-abdominal bleeding -due to splenic mass with GI infiltration. Status post embolization [employer] interlock coils x7. mri condtional 3t ok, safe immediately post implant.     IR PORT PLACEMENT EQUAL OR GREATER THAN 5 YEARS  8/24/2020    IR PORT PLACEMENT EQUAL OR GREATER THAN 5 YEARS 8/24/2020 Miki Soliman MD [facility]    PORT SURGERY      IP Port    TONSILLECTOMY         Medications:      lisinopril  2.5 mg Oral Daily    metoprolol succinate  25 mg Oral Daily    aspirin  81 mg Oral Daily    pantoprazole  40 mg Oral BID AC    enoxaparin  40 mg Subcutaneous Daily  potassium bicarb-citric acid  40 mEq Oral Daily    meropenem  1 g Intravenous Q8H    sodium chloride flush  10 mL Intravenous 2 times per day    ferrous sulfate  325 mg Oral Daily with breakfast    sertraline  50 mg Oral Daily    atorvastatin  20 mg Oral Daily    levetiracetam  1,500 mg Intravenous Q12H       Social History:     Social History     Socioeconomic History    Marital status: Single     Spouse name: Not on file    Number of children: Not on file    Years of education: Not on file    Highest education level: Not on file   Occupational History    Not on file   Social Needs    Financial resource strain: Not on file    Food insecurity     Worry: Not on file     Inability: Not on file    Transportation needs     Medical: Not on file     Non-medical: Not on file   Tobacco Use    Smoking status: Current Every Day Smoker     Packs/day: 1.00    Smokeless tobacco: Current User   Substance and Sexual Activity    Alcohol use: Not Currently    Drug use: Never    Sexual activity: Not on file   Lifestyle    Physical activity     Days per week: Not on file     Minutes per session: Not on file    Stress: Not on file   Relationships    Social connections     Talks on phone: Not on file     Gets together: Not on file     Attends Taoist service: Not on file     Active member of club or organization: Not on file     Attends meetings of clubs or organizations: Not on file     Relationship status: Not on file    Intimate partner violence     Fear of current or ex partner: Not on file     Emotionally abused: Not on file     Physically abused: Not on file     Forced sexual activity: Not on file   Other Topics Concern    Not on file   Social History Narrative    Not on file       Family History:     Family History   Problem Relation Age of Onset    Alcohol Abuse Mother     Cirrhosis Mother       Medical Decision Making:   I have independently reviewed/ordered the following labs: CBC with Differential:   Recent Labs     01/08/21  0514 01/09/21  0630   WBC 12.5* 9.8   HGB 10.2* 10.6*   HCT 35.8* 37.2   * 712*   LYMPHOPCT 14* 24   MONOPCT 8 9     BMP:  Recent Labs     01/08/21  0514 01/09/21  0630    140   K 3.9 3.6*    103   CO2 24 27   BUN 4* 8   CREATININE 0.25* 0.26*   MG 1.5* 1.6     Hepatic Function Panel:   Recent Labs     01/08/21  0514 01/09/21  0630   PROT 5.8* 5.8*   LABALBU 2.6* 2.6*   BILIDIR 0.09 <0.08   IBILI 0.14 CANNOT BE CALCULATED   BILITOT 0.23* 0.18*   ALKPHOS 77 77   ALT <5* <5*   AST 10 9     No results for input(s): RPR in the last 72 hours. No results for input(s): HIV in the last 72 hours. No results for input(s): BC in the last 72 hours. Lab Results   Component Value Date    CREATININE 0.26 01/09/2021    GLUCOSE 110 01/09/2021       Detailed results: Thank you for allowing us to participate in the care of this patient. Please call with questions. This note is created with the assistance of a speech recognition program.  While intending to generate adocument that actually reflects the content of the visit, the document can still have some errors including those of syntax and sound a like substitutions which may escape proof reading. It such instances, actual meaningcan be extrapolated by contextual diversion.     Luh Mahmood MD  Office: [phone]  Perfect serve / office [phone]

## 2021-01-09 NOTE — PROGRESS NOTES
Progress Note               Date:                           1/9/2021  Patient name:           Xiomara Mendoza [facility]  Date of admission:  1/4/2021  4:09 PM  MRN:   [id number]  YOB: 1965  PCP:                           No primary care provider on file. Subjective:   Patient seen and chart reviewed. She has been extubated and tolerated it well. Currently on room air. Able to converse appropriately. Mood improved from yesterday. She is aware of her medical condition. Constitutional: No fever or chills. No night sweats, no weight loss   Eyes: No eye discharge, double vision, or eye pain   HEENT: negative for sore mouth, sore throat, hoarseness and voice change   Respiratory: negative for cough , sputum, dyspnea, wheezing, hemoptysis, chest pain   Cardiovascular: negative for chest pain, dyspnea, palpitations, orthopnea, PND   Gastrointestinal: negative for nausea, vomiting, diarrhea, constipation, abdominal pain, Dysphagia, hematemesis and hematochezia   Genitourinary: negative for frequency, dysuria, nocturia, urinary incontinence, and hematuria   Integument: negative for rash, skin lesions, bruises. Hematologic/Lymphatic: negative for easy bruising, bleeding, lymphadenopathy, or petechiae   Endocrine: negative for heat or cold intolerance,weight changes, change in bowel habits and hair loss   Musculoskeletal: Complains of back pain however has a rectal tube inserted. Neurological:  headache improved.  No motor or sensory deficit     HPI in Brief:   The patient is a 60 y.o.  female who is admitted to the hospital for uncontrolled seizures. She is intubated. Seizures subsided. Pt is known to us with metastatic ovarian cancer on Doxil and avastin (recently held due to Gi bleeding, chemo was started 9/2020   She has been in a nd out of the hospital for recurrent seizures.  brain imaging showed no mets  ( done at [facility]?) Pt is intubated in ER. No bleeding now LTME in place, no current seizures      Past Medical History:   has a past medical history of Anemia, Bleeding, Cervical cancer ([facility] Depression, Diabetes mellitus ([facility] [address] [facility] GERD (gastroesophageal reflux disease), Metastatic cancer ([facility] [address] Ovarian cancer ([facility] [address] Post chemo evaluation, and Splenic lesion.     Past Surgical History:   has a past surgical history that includes Hysterectomy, total abdominal; Port Surgery; Tonsillectomy; IR PORT PLACEMENT > 5 YEARS (8/24/2020); Anus surgery; Abscess Drainage (2013); colectomy (03/2013); and IR EMBOLIZATION HEMORRHAGE (10/05/2020). Medications:   Reviewed in Epic     Objective:   Vitals: BP 97/67   Pulse 67   Temp 97.6 °F (36.4 °C) (Oral)   Resp 17   Ht 5' 6\" (1.676 m)   Wt 166 lb 7.2 oz (75.5 kg)   SpO2 97%   BMI 26.87 kg/m²       General appearance -alert oriented. Able to converse appropriately. Off supplemental oxygen. Eyes - pupils equal and reactive,  Ears - bilateral TM's and external ear canals normal  Mouth - mucous membranes moist  Neck - supple, no significant adenopathy  Chest - scattered rhonchi   Heart - normal rate, regular rhythm, normal S1, S2,   Abdomen - soft, nontender, nondistended, no masses or organomegaly  Neurological - sedated and intubated. Extremities - peripheral pulses normal, no pedal edema, no clubbing or cyanosis  Skin - pale, no bleeding. Data:  No intake/output data recorded. In: 215 [P.O.:205;  I.V.:10]  Out: 100 [Urine:75; Drains:25]  CBC:   Recent Labs     01/07/21 0325 01/08/21  0514 01/09/21  0630   WBC 14.3* 12.5* 9.8   HGB 10.3* 10.2* 10.6*   * 738* 712*     BMP:    Recent Labs     01/07/21 0325 01/08/21  0514 01/09/21  0630    137 140   K 3.3* 3.9 3.6*    103 103   CO2 24 24 27   BUN 4* 4* 8   CREATININE 0.31* 0.25* 0.26*   GLUCOSE 94 125* 110*     Hepatic:   Recent Labs     01/07/21  0325 01/08/21  0514 01/09/21  0630   AST 12 10 9 ALT 5 <5* <5*   BILITOT 0.25* 0.23* 0.18*   ALKPHOS 83 77 77     INR: No results for input(s): INR in the last 72 hours. IMAGING DATA:    Problem Lists:   Primary Problem:  <principal problem not specified>   Current Problems:  Active Hospital Problems    Diagnosis Date Noted    History of malignant neoplasm of ovary [Z85.43]     Status epilepticus ([address] [G40.901] 01/04/2021    Seizure ([address] [R56.9] 10/21/2020     PMH:  Past Medical History:   Diagnosis Date    Anemia     Bleeding 10/2020    intra-abdominal bleeding -due to splenic mass with GI infiltration. Status post embolization    Cervical cancer ([address]     Depression     Diabetes mellitus ([address]     GERD (gastroesophageal reflux disease)     Metastatic cancer ([address] 10/2020    extensive intraabdominal and splenic involvement and lung mets.  Ovarian cancer ([address]     low grade serous ovarian carcinoma    Post chemo evaluation     2007: Chemo via med onc (Dr. Jean Claude Barbour), 2008: Hulon Fresh due to rising CA-125, 2013: intraperitoneal chemo,12/2015: Ca125 - 25     Splenic lesion       Allergies: Allergies   Allergen Reactions    Ceftriaxone      Had a rash on ceftriaxone December 2020, [facility]      Assessment    1. Metastatic ovarian cancer. 2. Stage IV disease with diffuse metastasis per CT. 3. Recurrent seizures. 4. Acute respiratory failure secondary to status epilepticus likely from metastasis from underlying malignancy. 5. Exudative pleural effusion. Plan     1. Discussed with the patient and her family. 2. Clinically she is significantly better and seizures are controlled. Discussed further treatment for her ovarian cancer. Supportive care versus chemotherapy treatment. 3. Patient and family are interested in more active treatment. Will respect patient's wishes. This will be addressed further as outpatient based on her performance. 4. We will follow. Tex Ewing M.D.   Internal Medicine Resident , PGY-3 [facility]  01/09/21 3:55 PM             Attending Physician Statement   I have discussed the care of Joe Cat, including pertinent history and exam findings with the resident. I have reviewed the key elements of all parts of the encounter with the resident. I have seen and examined the patient with the resident. I agree with the assessment and plan and status of the problem list as documented.                                [facility]

## 2021-01-09 NOTE — PROGRESS NOTES
Pt requested to have her Fecal Management System removed, due to pain. I messaged Internal Medicine Intern and her request was denied. I will continue to monitor the patient's pain, and medicate her appropriately.

## 2021-01-10 LAB
ABSOLUTE EOS #: 0.74 K/UL (ref 0–0.44)
ABSOLUTE IMMATURE GRANULOCYTE: 0.11 K/UL (ref 0–0.3)
ABSOLUTE LYMPH #: 1.89 K/UL (ref 1.1–3.7)
ABSOLUTE MONO #: 0.95 K/UL (ref 0.1–1.2)
ALBUMIN SERPL-MCNC: 2.7 G/DL (ref 3.5–5.2)
ALBUMIN/GLOBULIN RATIO: 0.9 (ref 1–2.5)
ALP BLD-CCNC: 68 U/L (ref 35–104)
ALT SERPL-CCNC: 5 U/L (ref 5–33)
ANION GAP SERPL CALCULATED.3IONS-SCNC: 11 MMOL/L (ref 9–17)
AST SERPL-CCNC: 10 U/L
BASOPHILS # BLD: 1 % (ref 0–2)
BASOPHILS ABSOLUTE: 0.11 K/UL (ref 0–0.2)
BILIRUB SERPL-MCNC: <0.1 MG/DL (ref 0.3–1.2)
BILIRUBIN DIRECT: <0.08 MG/DL
BILIRUBIN, INDIRECT: ABNORMAL MG/DL (ref 0–1)
BUN BLDV-MCNC: 10 MG/DL (ref 6–20)
BUN/CREAT BLD: ABNORMAL (ref 9–20)
CALCIUM IONIZED: 1.03 MMOL/L (ref 1.13–1.33)
CALCIUM SERPL-MCNC: 8.1 MG/DL (ref 8.6–10.4)
CHLORIDE BLD-SCNC: 102 MMOL/L (ref 98–107)
CO2: 21 MMOL/L (ref 20–31)
CREAT SERPL-MCNC: 0.32 MG/DL (ref 0.5–0.9)
DIFFERENTIAL TYPE: ABNORMAL
EOSINOPHILS RELATIVE PERCENT: 7 % (ref 1–4)
ESTIMATED AVERAGE GLUCOSE: 126 MG/DL
GFR AFRICAN AMERICAN: >60 ML/MIN
GFR NON-AFRICAN AMERICAN: >60 ML/MIN
GFR SERPL CREATININE-BSD FRML MDRD: ABNORMAL ML/MIN/{1.73_M2}
GFR SERPL CREATININE-BSD FRML MDRD: ABNORMAL ML/MIN/{1.73_M2}
GLOBULIN: ABNORMAL G/DL (ref 1.5–3.8)
GLUCOSE BLD-MCNC: 127 MG/DL (ref 70–99)
HBA1C MFR BLD: 6 % (ref 4–6)
HCT VFR BLD CALC: 35 % (ref 36.3–47.1)
HEMOGLOBIN: 9.9 G/DL (ref 11.9–15.1)
IMMATURE GRANULOCYTES: 1 %
LYMPHOCYTES # BLD: 18 % (ref 24–43)
MAGNESIUM: 1.9 MG/DL (ref 1.6–2.6)
MCH RBC QN AUTO: 23.1 PG (ref 25.2–33.5)
MCHC RBC AUTO-ENTMCNC: 28.3 G/DL (ref 28.4–34.8)
MCV RBC AUTO: 81.6 FL (ref 82.6–102.9)
MONOCYTES # BLD: 9 % (ref 3–12)
MORPHOLOGY: ABNORMAL
MORPHOLOGY: ABNORMAL
NRBC AUTOMATED: 0 PER 100 WBC
PDW BLD-RTO: 25.7 % (ref 11.8–14.4)
PLATELET # BLD: 633 K/UL (ref 138–453)
PLATELET ESTIMATE: ABNORMAL
PMV BLD AUTO: 8.6 FL (ref 8.1–13.5)
POTASSIUM SERPL-SCNC: 4.1 MMOL/L (ref 3.7–5.3)
RBC # BLD: 4.29 M/UL (ref 3.95–5.11)
RBC # BLD: ABNORMAL 10*6/UL
SEG NEUTROPHILS: 64 % (ref 36–65)
SEGMENTED NEUTROPHILS ABSOLUTE COUNT: 6.7 K/UL (ref 1.5–8.1)
SODIUM BLD-SCNC: 134 MMOL/L (ref 135–144)
TOTAL PROTEIN: 5.8 G/DL (ref 6.4–8.3)
WBC # BLD: 10.5 K/UL (ref 3.5–11.3)
WBC # BLD: ABNORMAL 10*3/UL

## 2021-01-10 PROCEDURE — 6360000002 HC RX W HCPCS: Performed by: INTERNAL MEDICINE

## 2021-01-10 PROCEDURE — 2580000003 HC RX 258: Performed by: INTERNAL MEDICINE

## 2021-01-10 PROCEDURE — 6370000000 HC RX 637 (ALT 250 FOR IP): Performed by: NURSE PRACTITIONER

## 2021-01-10 PROCEDURE — 83735 ASSAY OF MAGNESIUM: CPT

## 2021-01-10 PROCEDURE — 2580000003 HC RX 258: Performed by: NURSE PRACTITIONER

## 2021-01-10 PROCEDURE — 99233 SBSQ HOSP IP/OBS HIGH 50: CPT | Performed by: INTERNAL MEDICINE

## 2021-01-10 PROCEDURE — 80076 HEPATIC FUNCTION PANEL: CPT

## 2021-01-10 PROCEDURE — 2060000000 HC ICU INTERMEDIATE R&B

## 2021-01-10 PROCEDURE — 6370000000 HC RX 637 (ALT 250 FOR IP): Performed by: STUDENT IN AN ORGANIZED HEALTH CARE EDUCATION/TRAINING PROGRAM

## 2021-01-10 PROCEDURE — 36415 COLL VENOUS BLD VENIPUNCTURE: CPT

## 2021-01-10 PROCEDURE — 99232 SBSQ HOSP IP/OBS MODERATE 35: CPT | Performed by: INTERNAL MEDICINE

## 2021-01-10 PROCEDURE — 80048 BASIC METABOLIC PNL TOTAL CA: CPT

## 2021-01-10 PROCEDURE — 6360000002 HC RX W HCPCS: Performed by: NURSE PRACTITIONER

## 2021-01-10 PROCEDURE — 82330 ASSAY OF CALCIUM: CPT

## 2021-01-10 PROCEDURE — 85025 COMPLETE CBC W/AUTO DIFF WBC: CPT

## 2021-01-10 RX ORDER — LORAZEPAM 1 MG/1
1 TABLET ORAL ONCE
Status: COMPLETED | OUTPATIENT
Start: 2021-01-10 | End: 2021-01-10

## 2021-01-10 RX ORDER — HYDROCODONE BITARTRATE AND ACETAMINOPHEN 5; 325 MG/1; MG/1
2 TABLET ORAL EVERY 6 HOURS PRN
Status: DISCONTINUED | OUTPATIENT
Start: 2021-01-10 | End: 2021-01-14 | Stop reason: HOSPADM

## 2021-01-10 RX ADMIN — SODIUM CHLORIDE, PRESERVATIVE FREE 10 ML: 5 INJECTION INTRAVENOUS at 08:16

## 2021-01-10 RX ADMIN — LEVETIRACETAM 1500 MG: 15 INJECTION INTRAVENOUS at 12:44

## 2021-01-10 RX ADMIN — PANTOPRAZOLE SODIUM 40 MG: 40 TABLET, DELAYED RELEASE ORAL at 08:18

## 2021-01-10 RX ADMIN — METOPROLOL SUCCINATE 25 MG: 25 TABLET, FILM COATED, EXTENDED RELEASE ORAL at 08:18

## 2021-01-10 RX ADMIN — HYDROCODONE BITARTRATE AND ACETAMINOPHEN 2 TABLET: 5; 325 TABLET ORAL at 20:51

## 2021-01-10 RX ADMIN — ATORVASTATIN CALCIUM 20 MG: 20 TABLET, FILM COATED ORAL at 08:18

## 2021-01-10 RX ADMIN — HYDROCODONE BITARTRATE AND ACETAMINOPHEN 1 TABLET: 5; 325 TABLET ORAL at 08:25

## 2021-01-10 RX ADMIN — LEVETIRACETAM 1500 MG: 15 INJECTION INTRAVENOUS at 20:53

## 2021-01-10 RX ADMIN — ASPIRIN 81 MG: 81 TABLET, CHEWABLE ORAL at 08:19

## 2021-01-10 RX ADMIN — MEROPENEM 1 G: 1 INJECTION, POWDER, FOR SOLUTION INTRAVENOUS at 16:47

## 2021-01-10 RX ADMIN — MEROPENEM 1 G: 1 INJECTION, POWDER, FOR SOLUTION INTRAVENOUS at 08:15

## 2021-01-10 RX ADMIN — FERROUS SULFATE TAB EC 325 MG (65 MG FE EQUIVALENT) 325 MG: 325 (65 FE) TABLET DELAYED RESPONSE at 08:18

## 2021-01-10 RX ADMIN — LORAZEPAM 1 MG: 1 TABLET ORAL at 18:25

## 2021-01-10 RX ADMIN — LISINOPRIL 2.5 MG: 2.5 TABLET ORAL at 08:18

## 2021-01-10 RX ADMIN — ENOXAPARIN SODIUM 40 MG: 40 INJECTION SUBCUTANEOUS at 08:19

## 2021-01-10 RX ADMIN — HYDROCODONE BITARTRATE AND ACETAMINOPHEN 2 TABLET: 5; 325 TABLET ORAL at 16:42

## 2021-01-10 RX ADMIN — HYDROCODONE BITARTRATE AND ACETAMINOPHEN 1 TABLET: 5; 325 TABLET ORAL at 12:49

## 2021-01-10 RX ADMIN — SERTRALINE 50 MG: 50 TABLET, FILM COATED ORAL at 08:18

## 2021-01-10 RX ADMIN — MEROPENEM 1 G: 1 INJECTION, POWDER, FOR SOLUTION INTRAVENOUS at 00:46

## 2021-01-10 ASSESSMENT — PAIN SCALES - GENERAL
PAINLEVEL_OUTOF10: 9
PAINLEVEL_OUTOF10: 7
PAINLEVEL_OUTOF10: 9

## 2021-01-10 ASSESSMENT — PAIN DESCRIPTION - PROGRESSION
CLINICAL_PROGRESSION: GRADUALLY WORSENING

## 2021-01-10 NOTE — PROGRESS NOTES
Progress Note               Date:                           1/10/2021  Patient name:           Carmen Garcia [facility]  Date of admission:  1/4/2021  4:09 PM  MRN:   [id number]  YOB: 1965  PCP:                           No primary care provider on file. Subjective:   Patient was seen and examined. Clinically stable. No events overnight. No headaches. No seizures. No weakness or numbness of the extremities. No chest pain or shortness of breath. No fever. No other complaints. Constitutional: No fever or chills. No night sweats, no weight loss   Eyes: No eye discharge, double vision, or eye pain   HEENT: negative for sore mouth, sore throat, hoarseness and voice change   Respiratory: negative for cough , sputum, dyspnea, wheezing, hemoptysis, chest pain   Cardiovascular: negative for chest pain, dyspnea, palpitations, orthopnea, PND   Gastrointestinal: negative for nausea, vomiting, diarrhea, constipation, abdominal pain, Dysphagia, hematemesis and hematochezia   Genitourinary: negative for frequency, dysuria, nocturia, urinary incontinence, and hematuria   Integument: negative for rash, skin lesions, bruises. Hematologic/Lymphatic: negative for easy bruising, bleeding, lymphadenopathy, or petechiae   Endocrine: negative for heat or cold intolerance,weight changes, change in bowel habits and hair loss   Musculoskeletal: Complains of back pain however has a rectal tube inserted. Neurological:  headache improved.  No motor or sensory deficit     HPI in Brief:   The patient is a 60 y.o.  female who is admitted to the hospital for uncontrolled seizures. She is intubated. Seizures subsided. Pt is known to us with metastatic ovarian cancer on Doxil and avastin (recently held due to Gi bleeding, chemo was started 9/2020   She has been in a nd out of the hospital for recurrent seizures.  brain imaging showed no mets  ( done at [facility]?) Pt is intubated in ER. No bleeding now LTME in place, no current seizures      Past Medical History:   has a past medical history of Anemia, Bleeding, Cervical cancer ([address] Depression, Diabetes mellitus ([facility] [address] GERD (gastroesophageal reflux disease), Metastatic cancer ([facility] Ovarian cancer ([facility] Post chemo evaluation, and Splenic lesion.     Past Surgical History:   has a past surgical history that includes Hysterectomy, total abdominal; Port Surgery; Tonsillectomy; IR PORT PLACEMENT > 5 YEARS (8/24/2020); Anus surgery; Abscess Drainage (2013); colectomy (03/2013); and IR EMBOLIZATION HEMORRHAGE (10/05/2020). Medications:   Reviewed in Epic     Objective:   Vitals: /71   Pulse 89   Temp 97.2 °F (36.2 °C) (Oral)   Resp 18   Ht 5' 6\" (1.676 m)   Wt 166 lb 7.2 oz (75.5 kg)   SpO2 98%   BMI 26.87 kg/m²       General appearance -alert oriented. Able to converse appropriately. Off supplemental oxygen. Eyes - pupils equal and reactive,  Ears - bilateral TM's and external ear canals normal  Mouth - mucous membranes moist  Neck - supple, no significant adenopathy  Chest - scattered rhonchi   Heart - normal rate, regular rhythm, normal S1, S2,   Abdomen - soft, nontender, nondistended, no masses or organomegaly  Neurological - sedated and intubated. Extremities - peripheral pulses normal, no pedal edema, no clubbing or cyanosis  Skin - pale, no bleeding. Data:  No intake/output data recorded. No intake/output data recorded.   CBC:   Recent Labs     01/08/21  0514 01/09/21  0630 01/10/21  0535   WBC 12.5* 9.8 10.5   HGB 10.2* 10.6* 9.9*   * 712* 633*     BMP:    Recent Labs     01/08/21 0514 01/09/21  0630 01/10/21  0535    140 134*   K 3.9 3.6* 4.1    103 102   CO2 24 27 21   BUN 4* 8 10   CREATININE 0.25* 0.26* 0.32*   GLUCOSE 125* 110* 127*     Hepatic:   Recent Labs     01/08/21 0514 01/09/21  0630 01/10/21  0535   AST 10 9 10   ALT <5* <5* 5 BILITOT 0.23* 0.18* <0.10*   ALKPHOS 77 77 68     INR: No results for input(s): INR in the last 72 hours. IMAGING DATA:    Problem Lists:   Primary Problem:  <principal problem not specified>   Current Problems:  Active Hospital Problems    Diagnosis Date Noted    History of malignant neoplasm of ovary [Z85.43]     Status epilepticus ([address] [G40.901] 01/04/2021    Seizure ([address] [R56.9] 10/21/2020     PMH:  Past Medical History:   Diagnosis Date    Anemia     Bleeding 10/2020    intra-abdominal bleeding -due to splenic mass with GI infiltration. Status post embolization    Cervical cancer ([address]     Depression     Diabetes mellitus ([address]     GERD (gastroesophageal reflux disease)     Metastatic cancer ([address] 10/2020    extensive intraabdominal and splenic involvement and lung mets.  Ovarian cancer ([address]     low grade serous ovarian carcinoma    Post chemo evaluation     2007: Chemo via med onc (Dr. Tyler Anders), 2008: Melvin Yumi due to rising CA-125, 2013: intraperitoneal chemo,12/2015: Ca125 - 25     Splenic lesion       Allergies: Allergies   Allergen Reactions    Ceftriaxone      Had a rash on ceftriaxone December 2020, [facility]      Assessment    1. Metastatic ovarian cancer. 2. Stage IV disease with diffuse metastasis per CT. 3. Recurrent seizures. 4. Acute respiratory failure secondary to status epilepticus likely from metastasis from underlying malignancy. 5. Exudative pleural effusion. Plan     1. Discussed with the patient and her family. 2. Clinically she is significantly better and seizures are controlled. Discussed further treatment for her ovarian cancer. Supportive care versus chemotherapy treatment. 3. Patient and family are interested in more active treatment. Will respect patient's wishes. This will be addressed further as outpatient based on her performance. 4. Evaluated by cardiology for NSTEMI. Planning cardiac cath and possible stents placement. Explained to the patient the benefits and risks of proceeding with cath and possible stents. If stents are placed patient will need to have double antiplatelets therapy. Patient had recent problems with splenic hemorrhage back in October 2020. It has been stable since then. At the same time patient is maintained on Doxil chemotherapy. Echo showed compromised cardiac function with ejection fraction down to 40. This will preclude us from using further anthracyclines. 5. After lengthy discussion with the patient and her son at bedside patient agreed on cardiac cath and possible stents placement. Considering all variables we believe that would be the best approach. If still interested in further chemotherapy down the line we will consider nonanthracycline agent. So for the time being patient is clear for cardiac cath and use of double antiplatelet therapy. 6. We will follow. [address] [place] [address] [facility]                            This note is created with the assistance of a speech recognition program.  While intending to generate a document that actually reflects the content of the visit, the document can still have some errors including those of syntax and sound a like substitutions which may escape proof reading. It such instances, actual meaning can be extrapolated by contextual diversion.

## 2021-01-10 NOTE — PROGRESS NOTES
[facility]  Internal Medicine Teaching Residency Program  Inpatient Daily Progress Note  ______________________________________________________________________________    Patient: Alejandro Love [facility]  YOB: 1965   EFP:[id number]    Acct: [de-identified]     Room: [address] [place] [address]  Admit date: 1/4/2021  Today's date: 01/10/21  Number of days in the hospital: 6    SUBJECTIVE   Admitting Diagnosis: Status epilepticus  CC: Seizures  Pt examined at bedside. Chart & results reviewed. Vitals stable, afebrile  -Had no acute issues overnight  -Patient is currently on meropenem as per recommendations of the ID team, likely discharge on Invanz 1 g daily x 30 days  Possible cardiac cath tomorrow ,cleared by ID for same. ROS:  Constitutional:  negative for chills, fevers, sweats  Respiratory:  negative for cough, dyspnea on exertion, hemoptysis, shortness of breath, wheezing  Cardiovascular:  negative for chest pain, chest pressure/discomfort, lower extremity edema, palpitations  Gastrointestinal:  negative for abdominal pain, constipation, diarrhea, nausea, vomiting  Neurological:  negative for dizziness, headache  BRIEF HISTORY   28-year-old female with history of diabetes, GERD, metastatic ovarian cancer that is post bilateral salpingo-oophorectomy, hysterectomy who presented as a transfer from [place] ER for status epilepticus.  Patient was found altered by family and upon EMS arrival she was noted to have a witnessed seizure and was given 4 mg of Versed without cessation of seizures. Margaretann Corado was an attempted nasal intubation which was unsuccessful and patient was transported to [place] ED.  Patient was intubated and started on propofol.  Once propofol was started clinical seizure activity had resolved.  Patient was noted to have marked leukocytosis and broad-spectrum antibiotics with Vanco and Zosyn were started.  Patient was also given 5 L of LR for fluid resuscitation.    With regards to seizures, her initial diagnosis was in 2020 where she was noted to have an MRI concerning for PRES. She was discharged home on Keppra 1500 mg twice daily.     Patient also had a recent visit at [address] [place] [address] she  had splenic embolization and subsequent splenic abscess requiring drain placement.  Was noted to be on Invanz per records.     With regards to her ovarian cancer, she follows up with Dr. Rebeka Palacios:   [facility] and was noted to be agitated and biting her ET tube.  She was continued on propofol which was increased to 60 mics due to agitation. Patient was admitted to the neuro ICU.  She was continued on broad-spectrum antibiotics.  ID consult was obtained.  Antibiotics were switched to meropenem.  Preliminary culture with gram-negative rods.  General surgery was consulted with no acute surgical intervention at this time. Tolerated extubation and did well. Heme/onc on board for her metastatic ovarian ca. Transferred to floors for further management.     OBJECTIVE     Vital Signs:  /78   Pulse 101   Temp 97.7 °F (36.5 °C) (Oral)   Resp 22   Ht 5' 6\" (1.676 m)   Wt 166 lb 7.2 oz (75.5 kg)   SpO2 100%   BMI 26.87 kg/m²     Temp (24hrs), Av °F (36.7 °C), Min:97.6 °F (36.4 °C), Max:98.6 °F (37 °C)    In: 50   Out: -     Physical Exam:    General appearance - alert, in no distress  Mental status - alert, oriented to person, place, and time  Eyes - pupils equal and reactive, extraocular eye movements intact  Mouth - mucous membranes moist, pharynx normal without lesions  Neck - supple, no significant adenopathy  Chest - clear to auscultation, no wheezes  Heart - normal rate, regular rhythm, normal S1, S2  Abdomen - soft, nontender, nondistended  Neurological - alert, oriented, normal speech, no focal deficits   Extremities - peripheral pulses normal, no pedal edema Skin - normal coloration and turgor, no rashes     Medications:  Scheduled Medications:    lisinopril  2.5 mg Oral Daily    metoprolol succinate  25 mg Oral Daily    aspirin  81 mg Oral Daily    pantoprazole  40 mg Oral BID AC    enoxaparin  40 mg Subcutaneous Daily    potassium bicarb-citric acid  40 mEq Oral Daily    meropenem  1 g Intravenous Q8H    sodium chloride flush  10 mL Intravenous 2 times per day    ferrous sulfate  325 mg Oral Daily with breakfast    sertraline  50 mg Oral Daily    atorvastatin  20 mg Oral Daily    levetiracetam  1,500 mg Intravenous Q12H     Continuous Infusions:    sodium chloride 100 mL/hr at 01/06/21 0750     PRN Medications    melatonin, 3 mg, Nightly PRN      magnesium sulfate, 1 g, PRN      HYDROcodone-acetaminophen, 1 tablet, Q4H PRN      sodium chloride flush, 10 mL, PRN      sodium chloride flush, 10 mL, PRN      promethazine, 12.5 mg, Q6H PRN    Or      ondansetron, 4 mg, Q6H PRN      polyethylene glycol, 17 g, Daily PRN      acetaminophen, 650 mg, Q6H PRN    Or      acetaminophen, 650 mg, Q6H PRN        Diagnostic Labs:  CBC:   Recent Labs     01/08/21  0514 01/09/21  0630 01/10/21  0535   WBC 12.5* 9.8 10.5   RBC 4.41 4.64 4.29   HGB 10.2* 10.6* 9.9*   HCT 35.8* 37.2 35.0*   MCV 81.2* 80.2* 81.6*   RDW 25.7* 26.0* 25.7*   * 712* 633*     BMP:   Recent Labs     01/08/21  0514 01/09/21  0630 01/10/21  0535    140 134*   K 3.9 3.6* 4.1    103 102   CO2 24 27 21   BUN 4* 8 10   CREATININE 0.25* 0.26* 0.32*     BNP: No results for input(s): BNP in the last 72 hours. PT/INR: No results for input(s): PROTIME, INR in the last 72 hours. APTT:   No results for input(s): APTT in the last 72 hours. CARDIAC ENZYMES: No results for input(s): CKMB, CKMBINDEX, TROPONINI in the last 72 hours.     Invalid input(s): CKTOTAL;3  FASTING LIPID PANEL:No results found for: CHOL, HDL, TRIG  LIVER PROFILE:   Recent Labs     01/08/21  0514 01/09/21 0630 01/10/21  0535   AST 10 9 10   ALT <5* <5* 5   BILIDIR 0.09 <0.08 <0.08   BILITOT 0.23* 0.18* <0.10*   ALKPHOS 77 77 68      MICROBIOLOGY:   Lab Results   Component Value Date/Time    CULTURE NO GROWTH 5 DAYS 01/05/2021 08:45 AM    CULTURE NO GROWTH 5 DAYS 01/05/2021 08:45 AM       Imaging:    Ct Head Wo Contrast    Result Date: 1/4/2021  No acute intracranial hemorrhage, mass-effect, midline shift, or abnormal extra-axial collection. In the setting of known malignant neoplasm with seizure, contrast-enhanced brain MRI is recommended to further evaluate for intracranial metastatic disease. Minimal regions of low attenuation within the periventricular, subcortical, and deep white matter, presumably sequelae of chronic microangiopathic ischemic white matter change, though ultimately age indeterminate without prior comparison imaging. Recommend attention on follow-up. Heterogeneous mineralization is suggested within the calvarium, clivus, and partially imaged upper cervical spine, suspicious for osseous metastatic disease in the setting of a known primary neoplasm. Attention on follow-up MRI. Air-fluid levels with aerated secretions in the sphenoid sinuses and ethmoid air cells, which may relate to intubated status versus underlying pre-existing acute sinusitis. Xr Chest Portable    Result Date: 1/7/2021  Support lines, as detailed Interval progression in now moderate vascular congestion Stable moderate left basilar opacity related to combined pleural-parenchymal process with slight progression in mild streaky right basilar atelectasis     Xr Chest Portable    Result Date: 1/6/2021  Overall stable examination. Cardiomegaly and pulmonary vascular congestion. Small left pleural effusion versus pleural scarring.      Xr Chest Portable    Result Date: 1/5/2021  Stable chest Stable support lines Possible mild edema with moderate left perihilar, left basilar opacity related to combined pleuroparenchymal process I have discussed the care of Joe Cat, including pertinent history and exam findings with the resident. I have reviewed the key elements of all parts of the encounter with the resident. I have seen and examined the patient with the resident. I agree with the assessment and plan and status of the problem list as documented. I seen the patient during my round today and lab seen chart reviewed other notes seen. There is no new complaint she is hemodynamically stable and afebrile. Discussed with patient about the pleural fluid and I am not sure whether she had any thoracentesis in [facility], she did not have any thoracentesis done here and she does have loculated pleural effusion on the left side and will need thoracentesis and pleural fluid analysis and if it is infected and depending upon thoracentesis she may need pigtail catheter placement if fluid for analysis suggestive of empyema and significant fluid left after thoracentesis. Continue to monitor drainage from the splenic drain. Patient is likely going to have cardiac catheterization tomorrow as recent echo since admission shows wall motion abnormalities and EF of 41%. Discussed with patient and patient son has question regarding the need for cardiac catheterization and will ask cardiology to talk to the patient and the son regarding the need for procedure and risk and benefit of procedure. Continue with antiepileptic medication. Continue with meropenem. Please note that this chart was generated using voice recognition Dragon dictation software. Although every effort was made to ensure the accuracy of this automated transcription, some errors in transcription may have occurred.      Xavier Birch MD  1/10/2021 5:07 PM

## 2021-01-10 NOTE — PROGRESS NOTES
[facility]   Progress Note                   Date:   1/10/2021  Patient name: Louanna Gaucher [facility]  Date of admission:  1/4/2021  4:09 PM  MRN:   [id number]  YOB: 1965  PCP: No primary care provider on file. Reason for Admission: Status epilepticus ([address] [G40.901]    Subjective:       Clinical Changes / Abnormalities: Patient seen and examined in room after discussion with [facility]. Denies chest pain or SOB. No CV concerns overnight. SR on tele HR 80     Medications:   Scheduled Meds:   lisinopril  2.5 mg Oral Daily    metoprolol succinate  25 mg Oral Daily    aspirin  81 mg Oral Daily    pantoprazole  40 mg Oral BID AC    enoxaparin  40 mg Subcutaneous Daily    potassium bicarb-citric acid  40 mEq Oral Daily    meropenem  1 g Intravenous Q8H    sodium chloride flush  10 mL Intravenous 2 times per day    ferrous sulfate  325 mg Oral Daily with breakfast    sertraline  50 mg Oral Daily    atorvastatin  20 mg Oral Daily    levetiracetam  1,500 mg Intravenous Q12H     Continuous Infusions:   sodium chloride 100 mL/hr at 01/06/21 0750     CBC:   Recent Labs     01/08/21  0514 01/09/21  0630 01/10/21  0535   WBC 12.5* 9.8 10.5   HGB 10.2* 10.6* 9.9*   * 712* 633*     BMP:    Recent Labs     01/08/21  0514 01/09/21  0630 01/10/21  0535    140 134*   K 3.9 3.6* 4.1    103 102   CO2 24 27 21   BUN 4* 8 10   CREATININE 0.25* 0.26* 0.32*   GLUCOSE 125* 110* 127*     Hepatic:   Recent Labs     01/08/21  0514 01/09/21  0630 01/10/21  0535   AST 10 9 10   ALT <5* <5* 5   BILITOT 0.23* 0.18* <0.10*   ALKPHOS 77 77 68     Troponin:   No results for input(s): TROPHS in the last 72 hours. BNP: No results for input(s): BNP in the last 72 hours. Lipids: No results for input(s): CHOL, HDL in the last 72 hours. Invalid input(s): LDLCALCU  INR: No results for input(s): INR in the last 72 hours.     Objective: Vitals: /78   Pulse 101   Temp 97.7 °F (36.5 °C) (Oral)   Resp 22   Ht 5' 6\" (1.676 m)   Wt 166 lb 7.2 oz (75.5 kg)   SpO2 100%   BMI 26.87 kg/m²   General appearance: intubated and sedated   HEENT: Head: Normocephalic, no lesions, without obvious abnormality. Neck: no JVD, trachea midline, no adenopathy  Lungs: Clear to auscultation throughout, mild anterior rhonchi. No rales. NC oxygen    Heart: Regular rate and rhythm, s1/s2 auscultated, no murmurs. SR/ST  Abdomen: soft, non-tender, bowel sounds active  Extremities: no edema  Neurologic: not done    EKG:    Date: 01/05/21  Reading: This tachycardia, inverted P waves in V1     LAST ECHO:  Date: 8/20  Findings Summary: Normal LV size, normal systolic function. LVEF 60 to 65%. No regional wall motion abnormalities. Mild concentric LVH. No valvular regurgitation or stenosis. Echo 1/6/21  Summary  Left ventricle is normal in size. Global left ventricular systolic function is mild to moderately reduced. Estimated ejection fraction is 40 % . The apex and apical segments appear severely hypokinetic. The basal segments  appear to be functioning normal.  Abnormal global L. strain of -11 %. Mild left ventricular hypertrophy. Grade I (mild) left ventricular diastolic dysfunction. Mild aortic insufficiency. Trivial mitral regurgitation. Trivial tricuspid regurgitation. Small to mild circumferential pericardial effusion, with no  echocardiographic tamponade. Assessment / Acute Cardiac Problems:   1. NSTEMI    2. New onset seizure  3. Stage 4 ovarian CA   4. Splenic abscess on IV ATB  5. Hypokalemia  6. Hypomagnesium  7.  CMP with LVEF on recent echo down to 40% with significant WMA    Patient Active Problem List:     Malignant neoplasm of ovary (HCC)     Seizure ([address]     Type 2 diabetes mellitus without complication, without long-term current use of insulin (HCC)     Anemia     Splenic lesion     Ovarian cancer ([address]     Acute encephalopathy PRES (posterior reversible encephalopathy syndrome)     Hemianopia, bitemporal     Encephalopathy     Status epilepticus ([facility]     History of malignant neoplasm of ovary      Plan of Treatment:   1. NSTEMI. Stable and without chest pain or SOB presently. Given reduced LVEF and new WMA will recommend cardiac cath for further evaluation. Will discuss with attending. Will need neurology and Hematology/oncology clearance for DAPT if needed. Okay for CATH per ID. Possible cath on Monday. 2. Emotional support provided  3. Continue PO ASA, statin, low dose BB & ACE    4. Keep K > 4 and Mag > 2 . Stable.      Electronically signed by ESTRELLITA Ness NP on 1/10/2021 at 9:17 AM  [address]  [phone]

## 2021-01-11 LAB
ABSOLUTE EOS #: 0.98 K/UL (ref 0–0.44)
ABSOLUTE IMMATURE GRANULOCYTE: 0 K/UL (ref 0–0.3)
ABSOLUTE LYMPH #: 1.76 K/UL (ref 1.1–3.7)
ABSOLUTE MONO #: 0.88 K/UL (ref 0.1–1.2)
ALBUMIN SERPL-MCNC: 2.9 G/DL (ref 3.5–5.2)
ALBUMIN/GLOBULIN RATIO: 0.9 (ref 1–2.5)
ALP BLD-CCNC: 75 U/L (ref 35–104)
ALT SERPL-CCNC: 6 U/L (ref 5–33)
ANION GAP SERPL CALCULATED.3IONS-SCNC: 10 MMOL/L (ref 9–17)
AST SERPL-CCNC: 12 U/L
BASOPHILS # BLD: 1 % (ref 0–2)
BASOPHILS ABSOLUTE: 0.1 K/UL (ref 0–0.2)
BILIRUB SERPL-MCNC: <0.1 MG/DL (ref 0.3–1.2)
BILIRUBIN DIRECT: <0.08 MG/DL
BILIRUBIN, INDIRECT: ABNORMAL MG/DL (ref 0–1)
BUN BLDV-MCNC: 11 MG/DL (ref 6–20)
BUN/CREAT BLD: ABNORMAL (ref 9–20)
CALCIUM IONIZED: 1.1 MMOL/L (ref 1.13–1.33)
CALCIUM SERPL-MCNC: 8.7 MG/DL (ref 8.6–10.4)
CHLORIDE BLD-SCNC: 105 MMOL/L (ref 98–107)
CO2: 25 MMOL/L (ref 20–31)
CREAT SERPL-MCNC: 0.29 MG/DL (ref 0.5–0.9)
CULTURE: NORMAL
CULTURE: NORMAL
DIFFERENTIAL TYPE: ABNORMAL
EOSINOPHILS RELATIVE PERCENT: 10 % (ref 1–4)
GFR AFRICAN AMERICAN: >60 ML/MIN
GFR NON-AFRICAN AMERICAN: >60 ML/MIN
GFR SERPL CREATININE-BSD FRML MDRD: ABNORMAL ML/MIN/{1.73_M2}
GFR SERPL CREATININE-BSD FRML MDRD: ABNORMAL ML/MIN/{1.73_M2}
GLOBULIN: ABNORMAL G/DL (ref 1.5–3.8)
GLUCOSE BLD-MCNC: 105 MG/DL (ref 65–105)
GLUCOSE BLD-MCNC: 110 MG/DL (ref 65–105)
GLUCOSE BLD-MCNC: 131 MG/DL (ref 65–105)
GLUCOSE BLD-MCNC: 132 MG/DL (ref 70–99)
GLUCOSE BLD-MCNC: 143 MG/DL (ref 65–105)
HCT VFR BLD CALC: 35.3 % (ref 36.3–47.1)
HEMOGLOBIN: 10.4 G/DL (ref 11.9–15.1)
IMMATURE GRANULOCYTES: 0 %
LYMPHOCYTES # BLD: 18 % (ref 24–43)
Lab: NORMAL
Lab: NORMAL
MAGNESIUM: 1.9 MG/DL (ref 1.6–2.6)
MCH RBC QN AUTO: 23.5 PG (ref 25.2–33.5)
MCHC RBC AUTO-ENTMCNC: 29.5 G/DL (ref 28.4–34.8)
MCV RBC AUTO: 79.9 FL (ref 82.6–102.9)
MONOCYTES # BLD: 9 % (ref 3–12)
MORPHOLOGY: ABNORMAL
MORPHOLOGY: ABNORMAL
NRBC AUTOMATED: 0 PER 100 WBC
PDW BLD-RTO: 25.9 % (ref 11.8–14.4)
PLATELET # BLD: 676 K/UL (ref 138–453)
PLATELET ESTIMATE: ABNORMAL
PMV BLD AUTO: 8.8 FL (ref 8.1–13.5)
POTASSIUM SERPL-SCNC: 4.5 MMOL/L (ref 3.7–5.3)
RBC # BLD: 4.42 M/UL (ref 3.95–5.11)
RBC # BLD: ABNORMAL 10*6/UL
SEG NEUTROPHILS: 62 % (ref 36–65)
SEGMENTED NEUTROPHILS ABSOLUTE COUNT: 6.08 K/UL (ref 1.5–8.1)
SODIUM BLD-SCNC: 140 MMOL/L (ref 135–144)
SPECIMEN DESCRIPTION: NORMAL
SPECIMEN DESCRIPTION: NORMAL
TOTAL PROTEIN: 6 G/DL (ref 6.4–8.3)
WBC # BLD: 9.8 K/UL (ref 3.5–11.3)
WBC # BLD: ABNORMAL 10*3/UL

## 2021-01-11 PROCEDURE — 6370000000 HC RX 637 (ALT 250 FOR IP): Performed by: STUDENT IN AN ORGANIZED HEALTH CARE EDUCATION/TRAINING PROGRAM

## 2021-01-11 PROCEDURE — 85025 COMPLETE CBC W/AUTO DIFF WBC: CPT

## 2021-01-11 PROCEDURE — 99233 SBSQ HOSP IP/OBS HIGH 50: CPT | Performed by: INTERNAL MEDICINE

## 2021-01-11 PROCEDURE — 6360000002 HC RX W HCPCS: Performed by: NURSE PRACTITIONER

## 2021-01-11 PROCEDURE — 97116 GAIT TRAINING THERAPY: CPT

## 2021-01-11 PROCEDURE — 97535 SELF CARE MNGMENT TRAINING: CPT

## 2021-01-11 PROCEDURE — 82330 ASSAY OF CALCIUM: CPT

## 2021-01-11 PROCEDURE — 6360000002 HC RX W HCPCS: Performed by: INTERNAL MEDICINE

## 2021-01-11 PROCEDURE — 36415 COLL VENOUS BLD VENIPUNCTURE: CPT

## 2021-01-11 PROCEDURE — 99254 IP/OBS CNSLTJ NEW/EST MOD 60: CPT | Performed by: PSYCHIATRY & NEUROLOGY

## 2021-01-11 PROCEDURE — 83735 ASSAY OF MAGNESIUM: CPT

## 2021-01-11 PROCEDURE — 99232 SBSQ HOSP IP/OBS MODERATE 35: CPT | Performed by: INTERNAL MEDICINE

## 2021-01-11 PROCEDURE — 2060000000 HC ICU INTERMEDIATE R&B

## 2021-01-11 PROCEDURE — 82947 ASSAY GLUCOSE BLOOD QUANT: CPT

## 2021-01-11 PROCEDURE — 6370000000 HC RX 637 (ALT 250 FOR IP): Performed by: NURSE PRACTITIONER

## 2021-01-11 PROCEDURE — 80048 BASIC METABOLIC PNL TOTAL CA: CPT

## 2021-01-11 PROCEDURE — 97530 THERAPEUTIC ACTIVITIES: CPT

## 2021-01-11 PROCEDURE — 2580000003 HC RX 258: Performed by: INTERNAL MEDICINE

## 2021-01-11 PROCEDURE — 99211 OFF/OP EST MAY X REQ PHY/QHP: CPT

## 2021-01-11 PROCEDURE — 80076 HEPATIC FUNCTION PANEL: CPT

## 2021-01-11 RX ORDER — DIAPER,BRIEF,INFANT-TODD,DISP
EACH MISCELLANEOUS 2 TIMES DAILY
Status: DISCONTINUED | OUTPATIENT
Start: 2021-01-11 | End: 2021-01-14 | Stop reason: HOSPADM

## 2021-01-11 RX ADMIN — HYDROCODONE BITARTRATE AND ACETAMINOPHEN 2 TABLET: 5; 325 TABLET ORAL at 21:07

## 2021-01-11 RX ADMIN — ASPIRIN 81 MG: 81 TABLET, CHEWABLE ORAL at 08:38

## 2021-01-11 RX ADMIN — POTASSIUM BICARBONATE 40 MEQ: 782 TABLET, EFFERVESCENT ORAL at 08:38

## 2021-01-11 RX ADMIN — MEROPENEM 1 G: 1 INJECTION, POWDER, FOR SOLUTION INTRAVENOUS at 10:39

## 2021-01-11 RX ADMIN — HYDROCODONE BITARTRATE AND ACETAMINOPHEN 2 TABLET: 5; 325 TABLET ORAL at 08:38

## 2021-01-11 RX ADMIN — MEROPENEM 1 G: 1 INJECTION, POWDER, FOR SOLUTION INTRAVENOUS at 18:29

## 2021-01-11 RX ADMIN — ENOXAPARIN SODIUM 40 MG: 40 INJECTION SUBCUTANEOUS at 08:38

## 2021-01-11 RX ADMIN — FERROUS SULFATE TAB EC 325 MG (65 MG FE EQUIVALENT) 325 MG: 325 (65 FE) TABLET DELAYED RESPONSE at 08:39

## 2021-01-11 RX ADMIN — HYDROCORTISONE: 10 CREAM TOPICAL at 23:23

## 2021-01-11 RX ADMIN — LEVETIRACETAM 1500 MG: 15 INJECTION INTRAVENOUS at 21:05

## 2021-01-11 RX ADMIN — ATORVASTATIN CALCIUM 20 MG: 20 TABLET, FILM COATED ORAL at 08:39

## 2021-01-11 RX ADMIN — PANTOPRAZOLE SODIUM 40 MG: 40 TABLET, DELAYED RELEASE ORAL at 08:39

## 2021-01-11 RX ADMIN — HYDROCODONE BITARTRATE AND ACETAMINOPHEN 2 TABLET: 5; 325 TABLET ORAL at 14:52

## 2021-01-11 RX ADMIN — HYDROCODONE BITARTRATE AND ACETAMINOPHEN 2 TABLET: 5; 325 TABLET ORAL at 00:41

## 2021-01-11 RX ADMIN — Medication 3 MG: at 21:07

## 2021-01-11 RX ADMIN — SERTRALINE 50 MG: 50 TABLET, FILM COATED ORAL at 08:39

## 2021-01-11 RX ADMIN — LEVETIRACETAM 1500 MG: 15 INJECTION INTRAVENOUS at 08:38

## 2021-01-11 ASSESSMENT — PAIN DESCRIPTION - PROGRESSION
CLINICAL_PROGRESSION: GRADUALLY WORSENING

## 2021-01-11 ASSESSMENT — ENCOUNTER SYMPTOMS
APNEA: 0
STRIDOR: 0
COLOR CHANGE: 0
WHEEZING: 0
DIARRHEA: 0
COUGH: 0
EYE DISCHARGE: 0
VOMITING: 0
CHOKING: 0
ABDOMINAL DISTENTION: 0
ABDOMINAL PAIN: 0
RHINORRHEA: 0
SORE THROAT: 0
NAUSEA: 0
CONSTIPATION: 0
SHORTNESS OF BREATH: 0
TROUBLE SWALLOWING: 0

## 2021-01-11 ASSESSMENT — PAIN SCALES - WONG BAKER: WONGBAKER_NUMERICALRESPONSE: 0

## 2021-01-11 ASSESSMENT — PAIN DESCRIPTION - ORIENTATION: ORIENTATION: LOWER

## 2021-01-11 ASSESSMENT — PAIN SCALES - GENERAL
PAINLEVEL_OUTOF10: 7
PAINLEVEL_OUTOF10: 7

## 2021-01-11 ASSESSMENT — PAIN DESCRIPTION - LOCATION
LOCATION: BACK
LOCATION: BACK

## 2021-01-11 NOTE — DISCHARGE INSTR - COC
Continuity of Care Form    Patient Name: Santana Olson   :  1965  MRN:  [id number]    Admit date:  2021  Discharge date:  21    Code Status Order: Full Code   Advance Directives:      Admitting Physician:  Armando Adkins MD  PCP: No primary care provider on file. Discharging Nurse: Angel Potter Unit/Room#: 8296/4023-32  Discharging Unit Phone Number: [phone]    Emergency Contact:   Extended Emergency Contact Information  Primary Emergency Contact: Arnulfo Carrillo hipaa  Address: n/a  Home Phone: [phone]  Work Phone: [phone]  Mobile Phone: [phone]  Relation: Child  Secondary Emergency Contact: Yovani Garcia (son hipaa)  Address: [address] [facility] [address] [place] [address]  Home Phone: [phone]  Relation: Child    Past Surgical History:  Past Surgical History:   Procedure Laterality Date    ABSCESS DRAINAGE      Franca rectal    ANUS SURGERY      ANAL FISSURECTOMY    COLECTOMY  2013    ex lap, tumor debulking, transverse colectomy w reanastamosis, subgastric omentectomy, intraperitoneal port placement    HYSTERECTOMY, TOTAL ABDOMINAL      IR EMBOLIZATION HEMORRHAGE  10/05/2020    intra-abdominal bleeding -due to splenic mass with GI infiltration. Status post embolization [employer] interlock coils x7. mri condtional 3t ok, safe immediately post implant.  IR PORT PLACEMENT EQUAL OR GREATER THAN 5 YEARS  2020    IR PORT PLACEMENT EQUAL OR GREATER THAN 5 YEARS 2020 Jerica Koenig MD [facility]URES    PORT SURGERY      IP Port    TONSILLECTOMY         Immunization History: There is no immunization history on file for this patient.     Active Problems:  Patient Active Problem List   Diagnosis Code    Malignant neoplasm of ovary ([address] C56.9    Seizure ([address] R56.9    Type 2 diabetes mellitus without complication, without long-term current use of insulin (HCC) E11.9    Anemia D64.9    Splenic lesion D73.89  Ovarian cancer (HCC) C56.9    Acute encephalopathy G93.40    PRES (posterior reversible encephalopathy syndrome) I67.83    Hemianopia, bitemporal H53.47    Encephalopathy G93.40    Status epilepticus (HCC) G40.901    History of malignant neoplasm of ovary Z85.43       Isolation/Infection:   Isolation            No Isolation          Patient Infection Status       Infection Onset Added Last Indicated Last Indicated By Review Planned Expiration Resolved Resolved By    None active    Resolved    COVID-19 Rule Out 08/20/20 08/20/20 08/21/20 Covid-19 Ambulatory (Ordered)   08/22/20 Rule-Out Test Resulted            Nurse Assessment:  Last Vital Signs: BP (!) 104/56   Pulse 83   Temp 98.2 °F (36.8 °C) (Oral)   Resp 17   Ht 5' 6\" (1.676 m)   Wt 166 lb 7.2 oz (75.5 kg)   SpO2 96%   BMI 26.87 kg/m²     Last documented pain score (0-10 scale): Pain Level: 7  Last Weight:   Wt Readings from Last 1 Encounters:   01/09/21 166 lb 7.2 oz (75.5 kg)     Mental Status:  oriented and alert    IV Access:  - chemo/implanted port    Nursing Mobility/ADLs:  Walking   Independent  Transfer  Independent  Bathing  Independent  Dressing  Independent  Toileting  Independent  Feeding  Independent  Med Admin  Assisted  Med Delivery   whole    Wound Care Documentation and Therapy:        Elimination:  Continence:   · Bowel: Yes  · Bladder: Yes  Urinary Catheter: None   Colostomy/Ileostomy/Ileal Conduit: No    Date of Last BM: 1/12/21    Intake/Output Summary (Last 24 hours) at 1/11/2021 1030  Last data filed at 1/10/2021 2310  Gross per 24 hour   Intake    Output 75 ml   Net -75 ml     I/O last 3 completed shifts:  In: -   Out: 75 [Drains:75]    Safety Concerns: At Risk for Falls    Impairments/Disabilities:      None    Nutrition Therapy:  Current Nutrition Therapy:   - Oral Diet:  General    Routes of Feeding: Oral  Liquids:  Thin Liquids  Daily Fluid Restriction: no Last Modified Barium Swallow with Video (Video Swallowing Test): not done    Treatments at the Time of Hospital Discharge:   Respiratory Treatments: see MAR  Oxygen Therapy:  is not on home oxygen therapy. Ventilator:    - No ventilator support    Rehab Therapies: Physical Therapy and Occupational Therapy  Weight Bearing Status/Restrictions: No weight bearing restirctions  Other Medical Equipment (for information only, NOT a DME order):  n/a  Other Treatments: Follow-up with physician for NOEL drain management    Patient's personal belongings (please select all that are sent with patient):  patient belonging bag    RN SIGNATURE:  Electronically signed by Mary Kate Sharp RN on 1/14/2021 at 1:52 PM    CASE MANAGEMENT/SOCIAL WORK SECTION    Inpatient Status Date:     Readmission Risk Assessment Score:  Readmission Risk              Risk of Unplanned Readmission:        22           Discharging to Facility/ Agency   · Name: [facility]  · Address:  · Phone:[phone]  · Fax:    Dialysis Facility (if applicable)   · Name:  · Address:  · Dialysis Schedule:  · Phone:  · Fax:    / signature: Electronically signed by Mcgee RN on 1/14/21 at 5:03 PM EST    PHYSICIAN SECTION    Prognosis: Fair    Condition at Discharge: Stable    Rehab Potential (if transferring to Rehab): Good    Recommended Labs or Other Treatments After Discharge: cbc , bmp and CT as advised    Physician Certification: I certify the above information and transfer of Karl Baca  is necessary for the continuing treatment of the diagnosis listed and that she requires Home Care for greater 30 days.      Update Admission H&P: No change in H&P    PHYSICIAN SIGNATURE:  Electronically signed by Janna Arreola MD on 1/13/21 at 12:42 PM EST

## 2021-01-11 NOTE — PROGRESS NOTES
[facility] Wound Ostomy Continence Nurse  Consult Note       NAME:  Vandana May [facility]  MEDICAL RECORD NUMBER:  [id number]  AGE: 60 y.o. GENDER: female  : 1965  TODAY'S DATE:  2021    Subjective:     Reason for WOCN Evaluation and Assessment: \"sacral wounds\"      Chao Kidd is a 60 y.o. female referred by:   [x] Physician  [] Nursing  [] Other:     Risk factors[de-identified] incontinence of stool     Patient's daughter at bedside reports patient having frequent diarrhea. Patient is alert and mobile in her bed today. PAST MEDICAL HISTORY        Diagnosis Date    Anemia     Bleeding 10/2020    intra-abdominal bleeding -due to splenic mass with GI infiltration. Status post embolization    [facility]ical [facility] ([facility]     Depression     Diabetes mellitus ([facility]     GERD (gastroesophageal reflux disease)     Metastatic cancer ([facility] 10/2020    extensive intraabdominal and splenic involvement and lung mets.  Ovarian cancer ([facility]     low grade serous ovarian carcinoma    Post chemo evaluation     2007: Chemo via med onc (Dr. Rebekah Hart), 2008: Corinda Prom due to rising CA-125, 2013: intraperitoneal chemo,2015: Ca125 - 25     Splenic lesion        PAST SURGICAL HISTORY    Past Surgical History:   Procedure Laterality Date    ABSCESS DRAINAGE      Franca rectal    ANUS SURGERY      ANAL FISSURECTOMY    COLECTOMY  2013    ex lap, tumor debulking, transverse colectomy w reanastamosis, subgastric omentectomy, intraperitoneal port placement    HYSTERECTOMY, TOTAL ABDOMINAL      IR EMBOLIZATION HEMORRHAGE  10/05/2020    intra-abdominal bleeding -due to splenic mass with GI infiltration. Status post embolization [employer] interlock coils x7. mri condtional 3t ok, safe immediately post implant.     IR PORT PLACEMENT EQUAL OR GREATER THAN 5 YEARS  2020    IR PORT PLACEMENT EQUAL OR GREATER THAN 5 YEARS 2020 Faith Olguin MD [facility]    PORT SURGERY      IP Port  TONSILLECTOMY         FAMILY HISTORY    Family History   Problem Relation Age of Onset    Alcohol Abuse Mother     Cirrhosis Mother        SOCIAL HISTORY    Social History     Tobacco Use    Smoking status: Current Every Day Smoker     Packs/day: 1.00    Smokeless tobacco: Current User   Substance Use Topics    Alcohol use: Not Currently    Drug use: Never       ALLERGIES    Allergies   Allergen Reactions    Ceftriaxone      Had a rash on ceftriaxone 2020, [facility]           Objective:      BP (!) 93/57   Pulse 95   Temp 97.3 °F (36.3 °C) (Oral)   Resp 18   Ht 5' 6\" (1.676 m)   Wt 166 lb 7.2 oz (75.5 kg)   SpO2 94%   BMI 26.87 kg/m²   Can Risk Score: Can Scale Score: 17    LABS    CBC:   Lab Results   Component Value Date    WBC 9.8 2021    RBC 4.42 2021    HGB 10.4 2021     CMP:  Albumin:    Lab Results   Component Value Date    LABALBU 2.9 2021     PT/INR:    Lab Results   Component Value Date    PROTIME 11.0 10/23/2020    INR 1.0 10/23/2020     HgBA1c:    Lab Results   Component Value Date    LABA1C 6.0 2021     PTT: No components found for: LABPTT      Assessment:     Patient Active Problem List   Diagnosis    Malignant neoplasm of ovary ([address]    Seizure ([address]    Type 2 diabetes mellitus without complication, without long-term current use of insulin (HCC)    Anemia    Splenic lesion    Ovarian cancer ([address]    Acute encephalopathy    PRES (posterior reversible encephalopathy syndrome)    Hemianopia, bitemporal    Encephalopathy    Status epilepticus ([address]    History of malignant neoplasm of ovary       Measurements:  Linear superficial peel of the  cleft. Clean, pink. Reddened intact skin present in the gluteal folds and inner buttocks r/t moisture. Response to treatment:  Well tolerated by patient. Plan:     Plan of Care:     Turn every 2 hours  Float heels of of bed with pillows under calves

## 2021-01-11 NOTE — PROGRESS NOTES
Physical Therapy  Facility/Department: [facility] NEURO  Daily Treatment Note  NAME: Lauren Brennan  : 1965  MRN: [id number]    Date of Service: 2021    Discharge Recommendations:  Home with assist PRN        Assessment   Body structures, Functions, Activity limitations: Decreased functional mobility ; Decreased posture;Decreased endurance;Decreased strength;Decreased balance; Increased pain;Decreased coordination  Assessment: Much improved mobility. Steady gait without device, 200'. 2 goals met. Will continue for balance and stair training. PT Education: PT Role;Goals;Plan of Care;Transfer Training;Gait Training;General Safety; Functional Mobility Training  REQUIRES PT FOLLOW UP: Yes  Activity Tolerance  Activity Tolerance: Patient Tolerated treatment well     Patient Diagnosis(es): The primary encounter diagnosis was Seizure ([facility] Diagnoses of Spleen, abscess and Splenic lesion were also pertinent to this visit. has a past medical history of Anemia, Bleeding, Cervical cancer ([facility] Depression, Diabetes mellitus ([facility] GERD (gastroesophageal reflux disease), Metastatic cancer ([facility] Ovarian cancer ([facility] [address] [facility] Post chemo evaluation, and Splenic lesion. has a past surgical history that includes Hysterectomy, total abdominal; Port Surgery; Tonsillectomy; IR PORT PLACEMENT > 5 YEARS (2020); Anus surgery; Abscess Drainage (); colectomy (2013); and IR EMBOLIZATION HEMORRHAGE (10/05/2020).     Restrictions  Restrictions/Precautions  Restrictions/Precautions: Seizure, Fall Risk  Required Braces or Orthoses?: No  Position Activity Restriction  Other position/activity restrictions: up with assist; SBP <140;  Subjective   General  Chart Reviewed: Yes  Family / Caregiver Present: Yes(dtr)  Subjective  Subjective: RN and pt in agreement for PT; pt supine in bed upon PT arrival.  Pain Screening  Patient Currently in Pain: Denies  Vital Signs  Patient Currently in Pain: Denies            Cognition Cognition  Overall Cognitive Status: WFL  Objective   Bed mobility  Supine to Sit: Supervision  Sit to Supine: Supervision  Transfers  Sit to Stand: Supervision  Stand to sit: Supervision  Ambulation  Ambulation?: Yes  Ambulation 1  Surface: level tile  Device: No Device  Assistance: Stand by assistance  Gait Deviations: Slow Cynthia  Distance: 200'  Comments: Steady gait, denies pain. Balance  Standing - Static: Fair;+  Standing - Dynamic: Fair;+  Comments: Steady in standing without device. Goals  Short term goals  Time Frame for Short term goals: 15  Short term goal 1: Pt to perform bed mobility and functional transfers with supervision. STG 1 met. Short term goal 2: Ambulate 150ft w/ RW SBA. STG 2 met. Short term goal 3: Ascend/descend 3 stairs with L rail CGA  Short term goal 4: Demonstrate standing balane of good - to decrease fall risk  Patient Goals   Patient goals :  To go home    Plan    Plan  Times per week: 5-6x/week  Current Treatment Recommendations: Strengthening, Transfer Training, Endurance Training, Patient/Caregiver Education & Training, ROM, Balance Training, Gait Training, Home Exercise Program, Functional Mobility Training, Stair training, Safety Education & Training  Safety Devices  Type of devices: Left in bed, Call light within reach, Gait belt, Nurse notified, Patient at risk for falls, Telesitter in use  Restraints  Initially in place: Yes  Restraints: 4 bed rails and bed buddies for seizure precautions     Therapy Time   Individual Concurrent Group Co-treatment   Time In 1030         Time Out 1055         Minutes 25         Timed Code Treatment Minutes: Nael 1690, PT

## 2021-01-11 NOTE — PROGRESS NOTES
[facility] -   Infectious diseases evaluation. Progress Note     admission date 1/4/2021    reason for consultation:   leukocytosis    Impression :   Current:  · Splenic abscess  - drained 12/2020  · Post spleen embolization for mass, 10/2020 STL - E coli bacteroides streptococcus  · STV cx E coli  · Ovarian metastatic cancer  · 1/5/21 - Seizures and status epilepticus  · intubated  · Bandemia  · Rash on ceftriaxone, [facility]  Discussion / summary of stay / plan of care   ·   Recommendations   · Keep meropenem for spleen abscess to cover org of STL and the current  E Coli  · DC on Invanz 1 g daily x 30 days  · Repeat US spleen once the pus has dried  · She might need to keep longer the drain till full resolution of the splenic necrosis  · She remains full code, oncology on board    Infection Control Recommendations   · [facility] Precautions  · Contact Isolation     Antimicrobial Stewardship Recommendations   · Simplification of therapy  · Targeted therapy    Coordination ofOutpatient Care:   · Estimated Length of IV antimicrobials:  · Patient will need Midline / picc Catheter Insertion:   · Patient will need SNF:  · Patient will need outpatient wound care:     History of Present Illness:     INITIAL HISTORY:    Nikki Bryant is a 60y.o.-year-old female w ovarian CA who came w status epilepticus, found down w SZ, intubated. Hx ovarian CA w mets to abd and lungs,    Recent GI bleed STL 10/2020, post embolization of the spleen for splenic mass. 12/2020 admitted w left splenic abscess and drained and sent recently on invanz [aspirate showed E. coli sensitive to ceftriaxone, Bacteroides, strep viridans.   When the patient was given ceftriaxone he had a rash and allergy reaction, hence it was stopped and he was sent on Invanz]  - left pleural fluid aspirated was then neg- At admission to [facility],  found w tarry fluid from the NGT and clots in ETT. possibly from nasal attempted intubation - Resolved-  She is full code. Left spleen drainage purulent. E coli on culture  Leukocytosis noticed, we are called for opinion. 1/5 left perisplenic fluid aspirated and grew E coli    CURRENT EVALUATION :  1/11/2021   Patient Vitals for the past 8 hrs:   BP Temp Temp src Pulse Resp SpO2 Height   01/11/21 1241       5' 6\" (1.676 m)   01/11/21 1230 (!) 93/57 97.3 °F (36.3 °C) Oral 95 18 94 %    01/11/21 0836 (!) 98/58         01/11/21 0800 93/61 97.8 °F (36.6 °C) Oral 94 16 95 %      No acute episodes overnight  Patient is heme stable and afebrile   No headache, seizures, weakness, chest pain, or shortness of breath  Afebrile  VS stable    Extubated 1/7    Drain with cloudy fluid: culture E coli multiS and diphteroids  Tolerating Meropenem  White count is improving   Oncology on board    Summary of relevant labs: 1/11/2021    Labs:  WBC 32 -14- 12-->9.8  Hb 10.4  Plat 676    Micro:  BC 1/5 x 2: No growth    U cx 1/5 : No growth     Perisplenic fluid  1/5/21  · cx 1/5 E coli  · LDH 60699  · RBC 03884  · WBC 707324    1/4 sputum negative for malignancy    Imaging:  CT AP 1/5  Interval placement of a percutaneous pigtail catheter left splenic complex   mass. Mesenteric, retroperitoneal, and left inguinal pathologic lymphadenopathy   consistent with metastatic disease. Multiple pulmonary nodules consistent with metastatic disease.  Prominent   right hilar lymph node. New left lower lobe consolidation and effusion. Diffuse bony blastic metastatic disease. MRI brain   Impression:  1. No acute intracranial abnormality. Specifically, no acute infarction, mesial temporal sclerosis, or intracranial metastatic disease. 2. Mild parenchymal volume loss. Sequela of mild chronic microvascular   ischemic changes.      CXR 1/5/21  Left basilar opacity and left perihilar infiukltrate I have personally reviewed the past medical history, past surgical history, medications, social history, and family history, and I haveupdated the database accordingly. Allergies:   Ceftriaxone     Review of Systems:     Review of Systems   Constitutional: Positive for activity change. Negative for appetite change, chills, diaphoresis, fatigue and fever. HENT: Negative for congestion, drooling, rhinorrhea, sneezing, sore throat and trouble swallowing. Eyes: Negative for discharge and visual disturbance. Respiratory: Negative for apnea, cough, choking, shortness of breath, wheezing and stridor. Cardiovascular: Negative for chest pain, palpitations and leg swelling. Gastrointestinal: Negative for abdominal distention, abdominal pain, constipation, diarrhea, nausea and vomiting. Endocrine: Negative for heat intolerance, polydipsia, polyphagia and polyuria. Genitourinary: Negative for dysuria, flank pain, frequency, hematuria and urgency. Musculoskeletal: Negative for arthralgias, joint swelling and myalgias. Skin: Negative for color change, rash and wound. Allergic/Immunologic: Negative for immunocompromised state. Neurological: Negative for dizziness, syncope, weakness, light-headedness and headaches. Hematological: Negative for adenopathy. Psychiatric/Behavioral: Negative for agitation, confusion, self-injury and sleep disturbance. The patient is not nervous/anxious. Physical Examination :     Physical Exam  Constitutional:       General: She is not in acute distress. Appearance: Normal appearance. She is normal weight. She is not ill-appearing or toxic-appearing. HENT:      Head: Normocephalic and atraumatic. Nose: Nose normal.      Mouth/Throat:      Mouth: Mucous membranes are moist.      Pharynx: Oropharynx is clear. No oropharyngeal exudate or posterior oropharyngeal erythema. Eyes:      General: No scleral icterus. Conjunctiva/sclera: Conjunctivae normal.      Pupils: Pupils are equal, round, and reactive to light. Neck:      Musculoskeletal: Neck supple. No muscular tenderness. Cardiovascular:      Rate and Rhythm: Normal rate and regular rhythm. Pulses: Normal pulses. Heart sounds: Normal heart sounds. No murmur. Pulmonary:      Effort: No respiratory distress. Breath sounds: Normal breath sounds. No stridor. No wheezing. Abdominal:      General: There is no distension. Palpations: Abdomen is soft. Tenderness: There is no abdominal tenderness. There is no guarding or rebound. Comments: Left UQ  drain w pus   Genitourinary:     Comments: Urina cirstiano  Musculoskeletal: Normal range of motion. General: No swelling. Right lower leg: No edema. Left lower leg: No edema. Skin:     Coloration: Skin is not jaundiced or pale. Findings: No bruising, erythema, lesion or rash. Neurological:      General: No focal deficit present. Mental Status: She is alert and oriented to person, place, and time. Sensory: No sensory deficit. Coordination: Coordination normal.      Deep Tendon Reflexes: Reflexes normal.   Psychiatric:         Mood and Affect: Mood normal.         Behavior: Behavior normal.      Comments: Appropriate        Past Medical History:     Past Medical History:   Diagnosis Date    Anemia     Bleeding 10/2020    intra-abdominal bleeding -due to splenic mass with GI infiltration. Status post embolization    Cervical cancer ([address]     Depression     Diabetes mellitus ([address]     GERD (gastroesophageal reflux disease)     Met[address] [address] [address] 10/2020    extensive intraabdominal and splenic involvement and lung mets.     Ovarian cancer ([facility] [address]     low grade serous ovarian carcinoma    Post chemo evaluation     2007: Chemo via med onc (Dr. Nirmal Garcia), 2008: Chemo due to rising CA-125, 2013: intraperitoneal chemo,12/2015: Ca125 - 25  Splenic lesion        Past Surgical  History:     Past Surgical History:   Procedure Laterality Date    ABSCESS DRAINAGE  2013    Franca rectal    ANUS SURGERY      ANAL FISSURECTOMY    COLECTOMY  03/2013    ex lap, tumor debulking, transverse colectomy w reanastamosis, subgastric omentectomy, intraperitoneal port placement    HYSTERECTOMY, TOTAL ABDOMINAL      IR EMBOLIZATION HEMORRHAGE  10/05/2020    intra-abdominal bleeding -due to splenic mass with GI infiltration. Status post embolization [employer] interlock coils x7. mri condtional 3t ok, safe immediately post implant.     IR PORT PLACEMENT EQUAL OR GREATER THAN 5 YEARS  8/24/2020    IR PORT PLACEMENT EQUAL OR GREATER THAN 5 YEARS 8/24/2020 Miki Soliman MD [facility]    PORT SURGERY      IP Port    TONSILLECTOMY         Medications:      lisinopril  2.5 mg Oral Daily    metoprolol succinate  25 mg Oral Daily    aspirin  81 mg Oral Daily    pantoprazole  40 mg Oral BID AC    enoxaparin  40 mg Subcutaneous Daily    potassium bicarb-citric acid  40 mEq Oral Daily    meropenem  1 g Intravenous Q8H    sodium chloride flush  10 mL Intravenous 2 times per day    ferrous sulfate  325 mg Oral Daily with breakfast    sertraline  50 mg Oral Daily    atorvastatin  20 mg Oral Daily    levetiracetam  1,500 mg Intravenous Q12H       Social History:     Social History     Socioeconomic History    Marital status: Single     Spouse name: Not on file    Number of children: Not on file    Years of education: Not on file    Highest education level: Not on file   Occupational History    Not on file   Social Needs    Financial resource strain: Not on file    Food insecurity     Worry: Not on file     Inability: Not on file    Transportation needs     Medical: Not on file     Non-medical: Not on file   Tobacco Use    Smoking status: Current Every Day Smoker     Packs/day: 1.00    Smokeless tobacco: Current User Substance and Sexual Activity    Alcohol use: Not Currently    Drug use: Never    Sexual activity: Not on file   Lifestyle    Physical activity     Days per week: Not on file     Minutes per session: Not on file    Stress: Not on file   Relationships    Social connections     Talks on phone: Not on file     Gets together: Not on file     Attends [employer] service: Not on file     Active member of club or organization: Not on file     Attends meetings of clubs or organizations: Not on file     Relationship status: Not on file    Intimate partner violence     Fear of current or ex partner: Not on file     Emotionally abused: Not on file     Physically abused: Not on file     Forced sexual activity: Not on file   Other Topics Concern    Not on file   Social History Narrative    Not on file       Family History:     Family History   Problem Relation Age of Onset    Alcohol Abuse Mother    Orellana Cirrhosis Mother       Medical Decision Making:   I have independently reviewed/ordered the following labs:    CBC with Differential:   Recent Labs     01/10/21  0535 01/11/21  0938   WBC 10.5 9.8   HGB 9.9* 10.4*   HCT 35.0* 35.3*   * 676*   LYMPHOPCT 18* 18*   MONOPCT 9 9     BMP:  Recent Labs     01/10/21  0535 01/11/21  0938   * 140   K 4.1 4.5    105   CO2 21 25   BUN 10 11   CREATININE 0.32* 0.29*   MG 1.9 1.9     Hepatic Function Panel:   Recent Labs     01/10/21  0535 01/11/21  0938   PROT 5.8* 6.0*   LABALBU 2.7* 2.9*   BILIDIR <0.08 <0.08   IBILI CANNOT BE CALCULATED CANNOT BE CALCULATED   BILITOT <0.10* <0.10*   ALKPHOS 68 75   ALT 5 6   AST 10 12     No results for input(s): RPR in the last 72 hours. No results for input(s): HIV in the last 72 hours. No results for input(s): BC in the last 72 hours.   Lab Results   Component Value Date    CREATININE 0.29 01/11/2021    GLUCOSE 132 01/11/2021     Detailed results:

## 2021-01-11 NOTE — PLAN OF CARE
Problem: Coping:  Goal: Ability to cope will improve  Description: Ability to cope will improve  Outcome: Ongoing     Problem: Coping:  Goal: Ability to identify appropriate support needs will improve  Description: Ability to identify appropriate support needs will improve  Outcome: Ongoing

## 2021-01-11 NOTE — PROGRESS NOTES
[facility]  Internal Medicine Teaching Residency Program  Inpatient Daily Progress Note  ______________________________________________________________________________    Patient: Lurdes Cerda [facility]  YOB: 1965   SPJ:[id number]    Acct: [de-identified]     Room: [facility]  Admit date: 1/4/2021  Today's date: 01/11/21  Number of days in the hospital: 7    SUBJECTIVE   Admitting Diagnosis: Status epilepticus  CC: Seizures  Pt examined at bedside. Chart & results reviewed. Blood pressure soft 90s over 50s ,afebrile  -Had no acute issues overnight  -Patient is currently on meropenem for splenic abscess as per recommendations of the ID team, likely discharge on Invanz 1 g daily x 30 days  Possible cardiac cath, cleared by ID for same. May need thoracentesis to evaluate pleural fluid.     ROS:  Constitutional:  negative for chills, fevers, sweats  Respiratory:  negative for cough, dyspnea on exertion, hemoptysis, shortness of breath, wheezing  Cardiovascular:  negative for chest pain, chest pressure/discomfort, lower extremity edema, palpitations  Gastrointestinal:  negative for abdominal pain, constipation, diarrhea, nausea, vomiting  Neurological:  negative for dizziness, headache  BRIEF HISTORY 33-year-old female with history of diabetes, GERD, metastatic ovarian cancer that is post bilateral salpingo-oophorectomy, hysterectomy who presented as a transfer from [place] ER for status epilepticus. Maddi Jonas was found altered by family and upon EMS arrival she was noted to have a witnessed seizure and was given 4 mg of Versed without cessation of seizures. Gleda Rebekah was an attempted nasal intubation which was unsuccessful and patient was transported to [facility] ED.  Patient was intubated and started on propofol.  Once propofol was started clinical seizure activity had resolved.  Patient was noted to have marked leukocytosis and broad-spectrum antibiotics with Vanco and Zosyn were started.  Patient was also given 5 L of LR for fluid resuscitation.     With regards to seizures, her initial diagnosis was in October 2020 where she was noted to have an MRI concerning for PRES. She was discharged home on Keppra 1500 mg twice daily.     Patient also had a recent visit at [address] [place] [address] she  had splenic embolization and subsequent splenic abscess requiring drain placement.  Was noted to be on Invanz per records.     With regards to her ovarian cancer, she follows up with Dr. Liz Griffin:   Patient arrived to [facility]'s ED and was noted to be agitated and biting her ET tube.  She was continued on propofol which was increased to 60 mics due to agitation. Patient was admitted to the neuro ICU.  She was continued on broad-spectrum antibiotics.  ID consult was obtained.  Antibiotics were switched to meropenem.  Preliminary culture with gram-negative rods.  General surgery was consulted with no acute surgical intervention at this time. Tolerated extubation and did well. Heme/onc on board for her metastatic ovarian ca. Transferred to floors for further management.     OBJECTIVE     Vital Signs: BP (!) 98/58   Pulse 94   Temp 97.8 °F (36.6 °C) (Oral)   Resp 16   Ht 5' 6\" (1.676 m)   Wt 166 lb 7.2 oz (75.5 kg)   SpO2 95%   BMI 26.87 kg/m²     Temp (24hrs), Av.1 °F (36.7 °C), Min:97.2 °F (36.2 °C), Max:98.5 °F (36.9 °C)    In: -   Out: 75 [Drains:75]    Physical Exam:    General appearance - alert, in no distress  Mental status - alert, oriented to person, place, and time  Eyes - pupils equal and reactive, extraocular eye movements intact  Mouth - mucous membranes moist, pharynx normal without lesions  Neck - supple, no significant adenopathy  Chest - clear to auscultation, no wheezes  Heart - normal rate, regular rhythm, normal S1, S2  Abdomen - soft, nontender, nondistended  Neurological - alert, oriented, normal speech, no focal deficits   Extremities - peripheral pulses normal, no pedal edema  Skin - normal coloration and turgor, no rashes     Medications:  Scheduled Medications:    lisinopril  2.5 mg Oral Daily    metoprolol succinate  25 mg Oral Daily    aspirin  81 mg Oral Daily    pantoprazole  40 mg Oral BID AC    enoxaparin  40 mg Subcutaneous Daily    potassium bicarb-citric acid  40 mEq Oral Daily    meropenem  1 g Intravenous Q8H    sodium chloride flush  10 mL Intravenous 2 times per day    ferrous sulfate  325 mg Oral Daily with breakfast    sertraline  50 mg Oral Daily    atorvastatin  20 mg Oral Daily    levetiracetam  1,500 mg Intravenous Q12H     Continuous Infusions:    sodium chloride 100 mL/hr at 21 0750     PRN Medications    HYDROcodone-acetaminophen, 2 tablet, Q6H PRN      melatonin, 3 mg, Nightly PRN      magnesium sulfate, 1 g, PRN      sodium chloride flush, 10 mL, PRN      sodium chloride flush, 10 mL, PRN      promethazine, 12.5 mg, Q6H PRN    Or      ondansetron, 4 mg, Q6H PRN      polyethylene glycol, 17 g, Daily PRN      acetaminophen, 650 mg, Q6H PRN    Or      acetaminophen, 650 mg, Q6H PRN        Diagnostic Labs:  CBC:   Recent Labs 01/09/21  0630 01/10/21  0535 01/11/21  0938   WBC 9.8 10.5 9.8   RBC 4.64 4.29 4.42   HGB 10.6* 9.9* 10.4*   HCT 37.2 35.0* 35.3*   MCV 80.2* 81.6* 79.9*   RDW 26.0* 25.7* 25.9*   * 633* 676*     BMP:   Recent Labs     01/09/21  0630 01/10/21  0535 01/11/21  0938    134* 140   K 3.6* 4.1 4.5    102 105   CO2 27 21 25   BUN 8 10 11   CREATININE 0.26* 0.32* 0.29*     BNP: No results for input(s): BNP in the last 72 hours. PT/INR: No results for input(s): PROTIME, INR in the last 72 hours. APTT:   No results for input(s): APTT in the last 72 hours. CARDIAC ENZYMES: No results for input(s): CKMB, CKMBINDEX, TROPONINI in the last 72 hours.     Invalid input(s): CKTOTAL;3  FASTING LIPID PANEL:No results found for: CHOL, HDL, TRIG  LIVER PROFILE:   Recent Labs     01/09/21  0630 01/10/21  0535 01/11/21  0938   AST 9 10 12   ALT <5* 5 6   BILIDIR <0.08 <0.08 <0.08   BILITOT 0.18* <0.10* <0.10*   ALKPHOS 77 68 75      MICROBIOLOGY:   Lab Results   Component Value Date/Time    CULTURE NO GROWTH 5 DAYS 01/05/2021 08:45 AM    CULTURE NO GROWTH 5 DAYS 01/05/2021 08:45 AM       Imaging:    Ct Head Wo Contrast    Result Date: 1/4/2021 No acute intracranial hemorrhage, mass-effect, midline shift, or abnormal extra-axial collection. In the setting of known malignant neoplasm with seizure, contrast-enhanced brain MRI is recommended to further evaluate for intracranial metastatic disease. Minimal regions of low attenuation within the periventricular, subcortical, and deep white matter, presumably sequelae of chronic microangiopathic ischemic white matter change, though ultimately age indeterminate without prior comparison imaging. Recommend attention on follow-up. Heterogeneous mineralization is suggested within the calvarium, clivus, and partially imaged upper cervical spine, suspicious for osseous metastatic disease in the setting of a known primary neoplasm. Attention on follow-up MRI. Air-fluid levels with aerated secretions in the sphenoid sinuses and ethmoid air cells, which may relate to intubated status versus underlying pre-existing acute sinusitis. Xr Chest Portable    Result Date: 1/7/2021  Support lines, as detailed Interval progression in now moderate vascular congestion Stable moderate left basilar opacity related to combined pleural-parenchymal process with slight progression in mild streaky right basilar atelectasis     Xr Chest Portable    Result Date: 1/6/2021  Overall stable examination. Cardiomegaly and pulmonary vascular congestion. Small left pleural effusion versus pleural scarring. Xr Chest Portable    Result Date: 1/5/2021  Stable chest Stable support lines Possible mild edema with moderate left perihilar, left basilar opacity related to combined pleuroparenchymal process     Xr Chest Portable    Result Date: 1/4/2021  ET tube as above. Life support apparatus otherwise stable. Mild-to-moderate edema and small left effusion unchanged.      Xr Chest Portable    Result Date: 1/4/2021 Small to moderate left pleural effusion with adjacent airspace disease. Left-sided chest tube terminates overlying the left lung base. Endotracheal and enteric tubes as above. Xr Abdomen For Ng/og/ne Tube Placement    Result Date: 1/4/2021  Enteric tube in the stomach. Other incidental findings as above. Ct Chest Abdomen Pelvis W Contrast    Result Date: 1/5/2021  Interval placement of a percutaneous pigtail catheter left splenic complex mass. Mesenteric, retroperitoneal, and left inguinal pathologic lymphadenopathy consistent with metastatic disease. Multiple pulmonary nodules consistent with metastatic disease. Prominent right hilar lymph node. New left lower lobe consolidation and effusion. Diffuse bony blastic metastatic disease. Mri Brain W Wo Contrast    Result Date: 1/5/2021  1. No acute intracranial abnormality. Specifically, no acute infarction, mesial temporal sclerosis, or intracranial metastatic disease. 2. Mild parenchymal volume loss. Sequela of mild chronic microvascular ischemic changes. ASSESSMENT & PLAN     ASSESSMENT / PLAN:     Acute respiratory failure requiring intubation secondary to status epilepticus:  -currently extubated and saturating well on room air     Status epilepticus with breakthrough seizures:  -Patient is currently on intravenous Keppra. -Further recommendations as per Neurology.     Metastatic ovarian cancer, stage IV:  -continue supportive care.    -Patient with guarded prognosis per oncology.    Patient and family interested in more active treatment.  -Heme/onco want to follow the patient as an outpatient     Exudative pleural effusion, likely related to ovarian cancer metastasis:  -cultures growing E. Coli and diphtheroids- seems possible that the culture is actually from splenic drain and mistakenly labelled as from pleura. -ID on board.   -May consider thoracentesis to evaluate pleural fluid.     Splenic abscess s/p drainage in December 2020: -ID following patient, currently on meropenem.  -Anticipate DC on Invanz 1 g daily for 30 days.  -Surgery was consulted, no intervention at this time,   -Repeat ultrasound spleen at 2 weeks time to see if the abscess is cleared     NSTEMI:  -Cardiology on board, reduced EF 40 % on echo, on aspirin, Lipitor, BB, ACE  -Cardiac cath as per cardiology ,cleared by ID for same.   Will need neurology and Hematology/oncology clearance for DAPT if needed as per Cardiology    Chronic combined systolic and diastolic congestive heart failure:  -Echo done on 01/04/2021 showed EF of 41%  -Leola and apical segments hypokinesia  -Grade 1 diastolic dysfunction  -Trivial tricuspid regurgitation, trivial mitral regurgitation, small to mild circumferential pericardial effusions with no echocardiographic evidence of tamponade  - on ACE inhibItor and BB     Type 2 diabetes mellitus:  -continue to monitor blood sugars  - controlled for now, without insulin     Anemia secondary to malignancy and medications along with infection  -currently on iron tablets  -Monitor H&H daily     Sacral decubitus ulcers  -wound care consulted.     Hypokalemia:  -monitor and replace     Leukocytosis  - improving,continue to monitor     Thrombocytosis  -Could be related to splenic abscess/infection  -The platelet counts are 148 today  -Hem/Onco  On board  -continue to monitor     Small to mild pericardial effusion:  -Monitor    DVT prophylaxis: lovenox  Gastric prophylaxis: Protonix    PT/OT: Ongoing  Discharge Jacob Barros MD  Internal Medicine Resident, PGY-1  [facility];  [place], [place]  1/11/2021, 10:46 AM

## 2021-01-11 NOTE — PROGRESS NOTES
Progress Note               Date:                           1/11/2021  Patient name:           [facility]  Date of admission:  1/4/2021  4:09 PM  MRN:   [id number]  YOB: 1965  PCP:                           No primary care provider on file. Subjective:   Patient was seen and examined. Clinically stable. No events overnight. No headaches. No seizures. No weakness or numbness of the extremities. No chest pain or shortness of breath. No fever. No other complaints. Input from cardiology needed. Plan for cardiac cath for NSTEMI. Discussed with patient in detail with risks and benefits. She agrees with cath . No objection from Hem- Onc perspective. Constitutional: No fever or chills. No night sweats, no weight loss   Eyes: No eye discharge, double vision, or eye pain   HEENT: negative for sore mouth, sore throat, hoarseness and voice change   Respiratory: negative for cough , sputum, dyspnea, wheezing, hemoptysis, chest pain   Cardiovascular: negative for chest pain, dyspnea, palpitations, orthopnea, PND   Gastrointestinal: negative for nausea, vomiting, diarrhea, constipation, abdominal pain, Dysphagia, hematemesis and hematochezia   Genitourinary: negative for frequency, dysuria, nocturia, urinary incontinence, and hematuria   Integument: negative for rash, skin lesions, bruises. Hematologic/Lymphatic: negative for easy bruising, bleeding, lymphadenopathy, or petechiae   Endocrine: negative for heat or cold intolerance,weight changes, change in bowel habits and hair loss   Musculoskeletal: Complains of back pain however has a rectal tube inserted. Neurological:  headache improved.  No motor or sensory deficit     HPI in Brief:   The patient is a 60 y.o.  female who is admitted to the hospital for uncontrolled seizures. She is intubated. Seizures subsided. Pt is known to us with metastatic ovarian cancer on Doxil and avastin (recently held due to Gi bleeding, chemo was started 9/2020   She has been in a nd out of the hospital for recurrent seizures. brain imaging showed no mets  ( done at [facility]?)   Pt is intubated in ER. No bleeding now LTME in place, no current seizures      Past Medical History:   has a past medical history of Anemia, Bleeding, Cervical cancer ([facility] Depression, Diabetes mellitus ([facility] [address] GERD (gastroesophageal reflux disease), Metastatic cancer ([facility] [address] Ovarian cancer ([facility] [address] Post chemo evaluation, and Splenic lesion.     Past Surgical History:   has a past surgical history that includes Hysterectomy, total abdominal; Port Surgery; Tonsillectomy; IR PORT PLACEMENT > 5 YEARS (8/24/2020); Anus surgery; Abscess Drainage (2013); colectomy (03/2013); and IR EMBOLIZATION HEMORRHAGE (10/05/2020). Medications:   Reviewed in Epic     Objective:   Vitals: BP (!) 98/58   Pulse 94   Temp 97.8 °F (36.6 °C) (Oral)   Resp 16   Ht 5' 6\" (1.676 m)   Wt 166 lb 7.2 oz (75.5 kg)   SpO2 95%   BMI 26.87 kg/m²       General appearance -alert oriented. Able to converse appropriately. Off supplemental oxygen. Eyes - pupils equal and reactive,  Ears - bilateral TM's and external ear canals normal  Mouth - mucous membranes moist  Neck - supple, no significant adenopathy  Chest - scattered rhonchi   Heart - normal rate, regular rhythm, normal S1, S2,   Abdomen - soft, nontender, nondistended, no masses or organomegaly  Neurological - sedated and intubated. Extremities - peripheral pulses normal, no pedal edema, no clubbing or cyanosis  Skin - pale, no bleeding. Data:  No intake/output data recorded.   In: -   Out: 75 [Drains:75]  CBC:   Recent Labs     01/09/21  0630 01/10/21  0535 01/11/21  0938   WBC 9.8 10.5 9.8   HGB 10.6* 9.9* 10.4*   * 633* 676*     BMP:    Recent Labs     01/09/21  0630 01/10/21  0535 01/11/21  0938    134* 140 K 3.6* 4.1 4.5    102 105   CO2 27 21 25   BUN 8 10 11   CREATININE 0.26* 0.32* 0.29*   GLUCOSE 110* 127* 132*     Hepatic:   Recent Labs     01/09/21  0630 01/10/21  0535 01/11/21  0938   AST 9 10 12   ALT <5* 5 6   BILITOT 0.18* <0.10* <0.10*   ALKPHOS 77 68 75     INR: No results for input(s): INR in the last 72 hours. IMAGING DATA:    Problem Lists:   Primary Problem:  <principal problem not specified>   Current Problems:  Active Hospital Problems    Diagnosis Date Noted    History of malignant neoplasm of ovary [Z85.43]     Status epilepticus ([address] [G40.901] 01/04/2021    Seizure ([address] [R56.9] 10/21/2020     PMH:  Past Medical History:   Diagnosis Date    Anemia     Bleeding 10/2020    intra-abdominal bleeding -due to splenic mass with GI infiltration. Status post embolization    Cervical cancer ([address]     Depression     Diabetes mellitus ([address]     GERD (gastroesophageal reflux disease)     Metastatic cancer ([address] 10/2020    extensive intraabdominal and splenic involvement and lung mets.  Ovarian cancer ([address]     low grade serous ovarian carcinoma    Post chemo evaluation     2007: Chemo via med onc (Dr. Nirmal Garcia), 2008: Jelly Laci due to rising CA-125, 2013: intraperitoneal chemo,12/2015: Ca125 - 25     Splenic lesion       Allergies: Allergies   Allergen Reactions    Ceftriaxone      Had a rash on ceftriaxone December 2020, [facility]      Assessment    1. Metastatic ovarian cancer. 2. Stage IV disease with diffuse metastasis per CT. 3. Recurrent seizures. 4. Acute respiratory failure secondary to status epilepticus likely from metastasis from underlying malignancy. 5. Exudative pleural effusion. Plan     1. Discussed with the patient and her family. 2. Clinically she is significantly better and seizures are controlled. Discussed further treatment for her ovarian cancer. Supportive care versus chemotherapy treatment. 3. Patient and family are interested in more active treatment. Will respect patient's wishes. This will be addressed further as outpatient based on her performance. 4. Evaluated by cardiology for NSTEMI. Planning cardiac cath and possible stents placement. Explained to the patient the benefits and risks of proceeding with cath and possible stents. If stents are placed patient will need to have double antiplatelets therapy. Patient had recent problems with splenic hemorrhage back in October 2020. It has been stable since then. At the same time patient is maintained on Doxil chemotherapy. Echo showed compromised cardiac function with ejection fraction down to 40. This will preclude us from using further anthracyclines. 5. After lengthy discussion with the patient and her son at bedside patient agreed on cardiac cath and possible stents placement. Considering all variables we believe that would be the best approach. If still interested in further chemotherapy down the line we will consider nonanthracycline agent. So for the time being patient is clear for cardiac cath and use of double antiplatelet therapy. 6. We will follow. Paula Sandhu M.D. Internal Medicine Resident , PGY-3  [address] [place] [address]  01/11/21 12:25 PM      This note is created with the assistance of a speech recognition program.  While intending to generate a document that actually reflects the content of the visit, the document can still have some errors including those of syntax and sound a like substitutions which may escape proof reading. It such instances, actual meaning can be extrapolated by contextual diversion.                  Attending Physician Statement I have discussed the care of [address] [place] [address], including pertinent history and exam findings with the resident. I have reviewed the key elements of all parts of the encounter with the resident. I have seen and examined the patient with the resident. I agree with the assessment and plan and status of the problem list as documented. From hematology oncology perspective patient is clear for cardiac cath tomorrow with possible stenting and use of double antiplatelet therapy.                             [address] [facility]

## 2021-01-11 NOTE — PROGRESS NOTES
Subjective: Pt asked why she was recieving PT/OT at the hospital and the difference between, pt cooperative after explanation given demo good understanding. General Comment  Comments: RN ok'd pt for OT tx. Pt cooperative and pleasant throughout. Pain Assessment  Pain Assessment: Faces  Bautista-Castellano Pain Rating: No hurt   Orientation     Objective    ADL  Grooming: Stand by assistance(OT facilitated oral care standing sinkside)  LE Dressing: Stand by assistance(OT faciltiated donning socks sitting EOB)  Toileting: Setup;Minimal assistance; Increased time to complete(OT facilitated toileting at [address] [place] [address] for transfer to avoid fall; completing task at supervision)        Balance  Sitting Balance: Stand by assistance  Standing Balance: Stand by assistance  Standing Balance  Time: 3-4 min  Activity: toileting, ADLs  Functional Mobility  Functional - Mobility Device: No device  Activity: To/from bathroom  Assist Level: Stand by assistance  Toilet Transfers  Toilet - Technique: Ambulating  Equipment Used: Grab bars  Toilet Transfer: Minimal assistance  Toilet Transfers Comments: Min A to avoid fall  Bed mobility  Supine to Sit: Supervision  Sit to Supine: Supervision  Scooting: Supervision  Transfers  Sit to stand: Stand by assistance  Stand to sit: Stand by assistance                       Cognition  Overall Cognitive Status: WFL  Arousal/Alertness: Appropriate responses to stimuli  Following Commands:  Follows one step commands with repetition  Safety Judgement: Decreased awareness of need for safety;Decreased awareness of need for assistance  Problem Solving: Assistance required to correct errors made  Insights: Decreased awareness of deficits  Initiation: Requires cues for some  Sequencing: Requires cues for some     Plan   Plan  Times per week: 3-5 x/wk

## 2021-01-11 NOTE — PROGRESS NOTES
Comprehensive Nutrition Assessment    Type and Reason for Visit:  Reassess    Nutrition Recommendations/Plan: Continue Ensure Enlive ONS twice daily. PO intake as tolerated. Nutrition Assessment:  Pt reports eating ~75% of meals. Like chocolate Ensure and is drinking well (per pt report). Denies any N/V at present. Malnutrition Assessment:  Malnutrition Status: Moderate malnutrition    Context:  Chronic Illness     Findings of the 6 clinical characteristics of malnutrition:  Energy Intake:  Mild decrease in energy intake (Comment)  Weight Loss:  7 - Greater than 7.5% over 3 months     Body Fat Loss:  1 - Mild body fat loss Triceps   Muscle Mass Loss:  Unable to assess    Fluid Accumulation:  1 - Mild Extremities   Strength:  Not Performed    Estimated Daily Nutrient Needs:  Energy (kcal):  25 kcal/kg = 6030-4546 kcals/day; Weight Used for Energy Requirements:  Admission     Protein (g):  1.3-1.5 gm/kg = 80-90 gm/day; Weight Used for Protein Requirements:  Ideal          Nutrition Related Findings:  Meds/labs reviewed      Wounds:  None(rednes on coccyx noted)       Current Nutrition Therapies:    DIET GENERAL;  Dietary Nutrition Supplements: Standard High Calorie Oral Supplement    Anthropometric Measures:  · Height: 5' 6\" (167.6 cm)  · Current Body Weight: 166 lb 7.2 oz (75.5 kg)   · Admission Body Weight: 165 lb 5.5 oz (75 kg)    · Usual Body Weight: 187 lb 13.3 oz (85.2 kg)(10/5/20 per EHR (noted wt fluctuations between ~180-190 lbs))     · Ideal Body Weight: 130 lbs; % Ideal Body Weight 128 %   · BMI: 26.9  · BMI Categories: Overweight (BMI 25.0-29. 9)       Nutrition Diagnosis:   · Inadequate oral intake related to (appetite, current condition) as evidenced by (decreased PO intake PTA, hx of weight loss x 3 months)      Nutrition Interventions:   Food and/or Nutrient Delivery:  Continue Current Diet, Continue Oral Nutrition Supplement

## 2021-01-11 NOTE — PROGRESS NOTES
Vitals: BP (!) 98/58   Pulse 94   Temp 97.8 °F (36.6 °C) (Oral)   Resp 16   Ht 5' 6\" (1.676 m)   Wt 166 lb 7.2 oz (75.5 kg)   SpO2 95%   BMI 26.87 kg/m²   General appearance: intubated and sedated   HEENT: Head: Normocephalic, no lesions, without obvious abnormality. Neck: no JVD, trachea midline, no adenopathy  Lungs: Clear to auscultation throughout, mild anterior rhonchi. No rales. NC oxygen    Heart: Regular rate and rhythm, s1/s2 auscultated, no murmurs. SR/ST  Abdomen: soft, non-tender, bowel sounds active  Extremities: no edema  Neurologic: not done    EKG:    Date: 01/05/21  Reading: This tachycardia, inverted P waves in V1     LAST ECHO:  Date: 8/20  Findings Summary: Normal LV size, normal systolic function. LVEF 60 to 65%. No regional wall motion abnormalities. Mild concentric LVH. No valvular regurgitation or stenosis. Echo 1/6/21  Summary  Left ventricle is normal in size. Global left ventricular systolic function is mild to moderately reduced. Estimated ejection fraction is 40 % . The apex and apical segments appear severely hypokinetic. The basal segments  appear to be functioning normal.  Abnormal global L. strain of -11 %. Mild left ventricular hypertrophy. Grade I (mild) left ventricular diastolic dysfunction. Mild aortic insufficiency. Trivial mitral regurgitation. Trivial tricuspid regurgitation. Small to mild circumferential pericardial effusion, with no  echocardiographic tamponade. Assessment / Acute Cardiac Problems:   1. NSTEMI    2. New onset seizure  3. Stage 4 ovarian CA   4. Splenic abscess on IV ATB  5. Hypokalemia  6. Hypomagnesium  7.  CMP with LVEF on recent echo down to 40% with significant WMA    Patient Active Problem List:     Malignant neoplasm of ovary (HCC)     Seizure ([address]     Type 2 diabetes mellitus without complication, without long-term current use of insulin (HCC)     Anemia     Splenic lesion     Ovarian cancer ([address]     Acute encephalopathy PRES (posterior reversible encephalopathy syndrome)     Hemianopia, bitemporal     Encephalopathy     Status epilepticus ([facility]     History of malignant neoplasm of ovary      Plan of Treatment:   1. NSTEMI. Stable and without chest pain or SOB presently. Appreciate hematology and ID review and clearance for cath. Will verify with neurology and if cleared will plan for cath tomorrow. I have discussed risks (including but not limited to vascular injury, infection, hematoma, contrast induced kidney dysfunction, CVA and MI), benefits, alternatives in detail. All questions answered. Patient agrees to proceed. 2. Emotional support provided  3. Continue PO ASA, statin, low dose BB & ACE    4. Keep K > 4 and Mag > 2 . Stable.      Electronically signed by ESTRELLITA Blanc CNP on 1/11/2021 at 10:54 [id number].  [phone]

## 2021-01-12 ENCOUNTER — APPOINTMENT (OUTPATIENT)
Dept: CARDIAC CATH/INVASIVE PROCEDURES | Age: 56
DRG: 871 | End: 2021-01-12
Payer: COMMERCIAL

## 2021-01-12 LAB
ABSOLUTE EOS #: 0.89 K/UL (ref 0–0.44)
ABSOLUTE IMMATURE GRANULOCYTE: 0.09 K/UL (ref 0–0.3)
ABSOLUTE LYMPH #: 2.4 K/UL (ref 1.1–3.7)
ABSOLUTE MONO #: 0.8 K/UL (ref 0.1–1.2)
ALBUMIN SERPL-MCNC: 2.8 G/DL (ref 3.5–5.2)
ALBUMIN/GLOBULIN RATIO: 0.8 (ref 1–2.5)
ALP BLD-CCNC: 73 U/L (ref 35–104)
ALT SERPL-CCNC: 7 U/L (ref 5–33)
ANION GAP SERPL CALCULATED.3IONS-SCNC: 6 MMOL/L (ref 9–17)
AST SERPL-CCNC: 13 U/L
BASOPHILS # BLD: 1 % (ref 0–2)
BASOPHILS ABSOLUTE: 0.09 K/UL (ref 0–0.2)
BILIRUB SERPL-MCNC: <0.1 MG/DL (ref 0.3–1.2)
BILIRUBIN DIRECT: <0.08 MG/DL
BILIRUBIN, INDIRECT: ABNORMAL MG/DL (ref 0–1)
BUN BLDV-MCNC: 15 MG/DL (ref 6–20)
BUN/CREAT BLD: ABNORMAL (ref 9–20)
CALCIUM IONIZED: 1.1 MMOL/L (ref 1.13–1.33)
CALCIUM SERPL-MCNC: 8.8 MG/DL (ref 8.6–10.4)
CHLORIDE BLD-SCNC: 102 MMOL/L (ref 98–107)
CO2: 24 MMOL/L (ref 20–31)
CREAT SERPL-MCNC: 0.35 MG/DL (ref 0.5–0.9)
DIFFERENTIAL TYPE: ABNORMAL
EOSINOPHILS RELATIVE PERCENT: 10 % (ref 1–4)
GFR AFRICAN AMERICAN: >60 ML/MIN
GFR NON-AFRICAN AMERICAN: >60 ML/MIN
GFR SERPL CREATININE-BSD FRML MDRD: ABNORMAL ML/MIN/{1.73_M2}
GFR SERPL CREATININE-BSD FRML MDRD: ABNORMAL ML/MIN/{1.73_M2}
GLOBULIN: ABNORMAL G/DL (ref 1.5–3.8)
GLUCOSE BLD-MCNC: 107 MG/DL (ref 65–105)
GLUCOSE BLD-MCNC: 120 MG/DL (ref 70–99)
GLUCOSE BLD-MCNC: 128 MG/DL (ref 65–105)
GLUCOSE BLD-MCNC: 135 MG/DL (ref 65–105)
GLUCOSE BLD-MCNC: 97 MG/DL (ref 65–105)
HCT VFR BLD CALC: 34.7 % (ref 36.3–47.1)
HEMOGLOBIN: 9.9 G/DL (ref 11.9–15.1)
IMMATURE GRANULOCYTES: 1 %
LYMPHOCYTES # BLD: 27 % (ref 24–43)
MAGNESIUM: 2.5 MG/DL (ref 1.6–2.6)
MCH RBC QN AUTO: 23.1 PG (ref 25.2–33.5)
MCHC RBC AUTO-ENTMCNC: 28.5 G/DL (ref 28.4–34.8)
MCV RBC AUTO: 80.9 FL (ref 82.6–102.9)
MONOCYTES # BLD: 9 % (ref 3–12)
MORPHOLOGY: ABNORMAL
MORPHOLOGY: ABNORMAL
NRBC AUTOMATED: 0 PER 100 WBC
PDW BLD-RTO: 25.9 % (ref 11.8–14.4)
PLATELET # BLD: 654 K/UL (ref 138–453)
PLATELET ESTIMATE: ABNORMAL
PMV BLD AUTO: 8.9 FL (ref 8.1–13.5)
POTASSIUM SERPL-SCNC: 4.4 MMOL/L (ref 3.7–5.3)
RBC # BLD: 4.29 M/UL (ref 3.95–5.11)
RBC # BLD: ABNORMAL 10*6/UL
SEG NEUTROPHILS: 52 % (ref 36–65)
SEGMENTED NEUTROPHILS ABSOLUTE COUNT: 4.63 K/UL (ref 1.5–8.1)
SODIUM BLD-SCNC: 132 MMOL/L (ref 135–144)
TOTAL PROTEIN: 6.2 G/DL (ref 6.4–8.3)
WBC # BLD: 8.9 K/UL (ref 3.5–11.3)
WBC # BLD: ABNORMAL 10*3/UL

## 2021-01-12 PROCEDURE — 83735 ASSAY OF MAGNESIUM: CPT

## 2021-01-12 PROCEDURE — 87075 CULTR BACTERIA EXCEPT BLOOD: CPT

## 2021-01-12 PROCEDURE — 6370000000 HC RX 637 (ALT 250 FOR IP): Performed by: NURSE PRACTITIONER

## 2021-01-12 PROCEDURE — 6370000000 HC RX 637 (ALT 250 FOR IP): Performed by: STUDENT IN AN ORGANIZED HEALTH CARE EDUCATION/TRAINING PROGRAM

## 2021-01-12 PROCEDURE — 6370000000 HC RX 637 (ALT 250 FOR IP): Performed by: INTERNAL MEDICINE

## 2021-01-12 PROCEDURE — B2111ZZ FLUOROSCOPY OF MULTIPLE CORONARY ARTERIES USING LOW OSMOLAR CONTRAST: ICD-10-PCS | Performed by: INTERNAL MEDICINE

## 2021-01-12 PROCEDURE — C1887 CATHETER, GUIDING: HCPCS

## 2021-01-12 PROCEDURE — 89051 BODY FLUID CELL COUNT: CPT

## 2021-01-12 PROCEDURE — 6360000002 HC RX W HCPCS

## 2021-01-12 PROCEDURE — 87116 MYCOBACTERIA CULTURE: CPT

## 2021-01-12 PROCEDURE — 4A023N7 MEASUREMENT OF CARDIAC SAMPLING AND PRESSURE, LEFT HEART, PERCUTANEOUS APPROACH: ICD-10-PCS | Performed by: INTERNAL MEDICINE

## 2021-01-12 PROCEDURE — 2060000000 HC ICU INTERMEDIATE R&B

## 2021-01-12 PROCEDURE — 87102 FUNGUS ISOLATION CULTURE: CPT

## 2021-01-12 PROCEDURE — 36415 COLL VENOUS BLD VENIPUNCTURE: CPT

## 2021-01-12 PROCEDURE — 82947 ASSAY GLUCOSE BLOOD QUANT: CPT

## 2021-01-12 PROCEDURE — 83615 LACTATE (LD) (LDH) ENZYME: CPT

## 2021-01-12 PROCEDURE — 6370000000 HC RX 637 (ALT 250 FOR IP): Performed by: FAMILY MEDICINE

## 2021-01-12 PROCEDURE — 6360000004 HC RX CONTRAST MEDICATION

## 2021-01-12 PROCEDURE — 2580000003 HC RX 258: Performed by: STUDENT IN AN ORGANIZED HEALTH CARE EDUCATION/TRAINING PROGRAM

## 2021-01-12 PROCEDURE — 99232 SBSQ HOSP IP/OBS MODERATE 35: CPT | Performed by: PSYCHIATRY & NEUROLOGY

## 2021-01-12 PROCEDURE — 87070 CULTURE OTHR SPECIMN AEROBIC: CPT

## 2021-01-12 PROCEDURE — B2151ZZ FLUOROSCOPY OF LEFT HEART USING LOW OSMOLAR CONTRAST: ICD-10-PCS | Performed by: INTERNAL MEDICINE

## 2021-01-12 PROCEDURE — 6360000002 HC RX W HCPCS: Performed by: INTERNAL MEDICINE

## 2021-01-12 PROCEDURE — 99232 SBSQ HOSP IP/OBS MODERATE 35: CPT | Performed by: INTERNAL MEDICINE

## 2021-01-12 PROCEDURE — 88305 TISSUE EXAM BY PATHOLOGIST: CPT

## 2021-01-12 PROCEDURE — 80048 BASIC METABOLIC PNL TOTAL CA: CPT

## 2021-01-12 PROCEDURE — 87205 SMEAR GRAM STAIN: CPT

## 2021-01-12 PROCEDURE — 83986 ASSAY PH BODY FLUID NOS: CPT

## 2021-01-12 PROCEDURE — 2580000003 HC RX 258: Performed by: INTERNAL MEDICINE

## 2021-01-12 PROCEDURE — 85025 COMPLETE CBC W/AUTO DIFF WBC: CPT

## 2021-01-12 PROCEDURE — 2500000003 HC RX 250 WO HCPCS

## 2021-01-12 PROCEDURE — 82945 GLUCOSE OTHER FLUID: CPT

## 2021-01-12 PROCEDURE — 80076 HEPATIC FUNCTION PANEL: CPT

## 2021-01-12 PROCEDURE — C1894 INTRO/SHEATH, NON-LASER: HCPCS

## 2021-01-12 PROCEDURE — 82330 ASSAY OF CALCIUM: CPT

## 2021-01-12 PROCEDURE — 88112 CYTOPATH CELL ENHANCE TECH: CPT

## 2021-01-12 PROCEDURE — 93458 L HRT ARTERY/VENTRICLE ANGIO: CPT | Performed by: INTERNAL MEDICINE

## 2021-01-12 PROCEDURE — 87206 SMEAR FLUORESCENT/ACID STAI: CPT

## 2021-01-12 PROCEDURE — 99233 SBSQ HOSP IP/OBS HIGH 50: CPT | Performed by: INTERNAL MEDICINE

## 2021-01-12 PROCEDURE — 84157 ASSAY OF PROTEIN OTHER: CPT

## 2021-01-12 PROCEDURE — 2709999900 HC NON-CHARGEABLE SUPPLY

## 2021-01-12 PROCEDURE — C1769 GUIDE WIRE: HCPCS

## 2021-01-12 RX ORDER — SODIUM CHLORIDE 0.9 % (FLUSH) 0.9 %
10 SYRINGE (ML) INJECTION PRN
Status: DISCONTINUED | OUTPATIENT
Start: 2021-01-12 | End: 2021-01-14 | Stop reason: HOSPADM

## 2021-01-12 RX ORDER — ACETAMINOPHEN 325 MG/1
650 TABLET ORAL EVERY 4 HOURS PRN
Status: DISCONTINUED | OUTPATIENT
Start: 2021-01-12 | End: 2021-01-14 | Stop reason: HOSPADM

## 2021-01-12 RX ORDER — LEVETIRACETAM 500 MG/1
1500 TABLET ORAL EVERY 12 HOURS
Status: DISCONTINUED | OUTPATIENT
Start: 2021-01-12 | End: 2021-01-14 | Stop reason: HOSPADM

## 2021-01-12 RX ORDER — SODIUM CHLORIDE 0.9 % (FLUSH) 0.9 %
10 SYRINGE (ML) INJECTION EVERY 12 HOURS SCHEDULED
Status: DISCONTINUED | OUTPATIENT
Start: 2021-01-12 | End: 2021-01-14 | Stop reason: HOSPADM

## 2021-01-12 RX ORDER — ATORVASTATIN CALCIUM 40 MG/1
40 TABLET, FILM COATED ORAL DAILY
Status: DISCONTINUED | OUTPATIENT
Start: 2021-01-13 | End: 2021-01-14 | Stop reason: HOSPADM

## 2021-01-12 RX ORDER — LORAZEPAM 1 MG/1
1 TABLET ORAL ONCE
Status: COMPLETED | OUTPATIENT
Start: 2021-01-12 | End: 2021-01-12

## 2021-01-12 RX ADMIN — LISINOPRIL 2.5 MG: 2.5 TABLET ORAL at 09:21

## 2021-01-12 RX ADMIN — SERTRALINE 50 MG: 50 TABLET, FILM COATED ORAL at 09:20

## 2021-01-12 RX ADMIN — HYDROCODONE BITARTRATE AND ACETAMINOPHEN 2 TABLET: 5; 325 TABLET ORAL at 06:24

## 2021-01-12 RX ADMIN — POTASSIUM BICARBONATE 40 MEQ: 782 TABLET, EFFERVESCENT ORAL at 09:20

## 2021-01-12 RX ADMIN — FERROUS SULFATE TAB EC 325 MG (65 MG FE EQUIVALENT) 325 MG: 325 (65 FE) TABLET DELAYED RESPONSE at 09:23

## 2021-01-12 RX ADMIN — SODIUM CHLORIDE: 9 INJECTION, SOLUTION INTRAVENOUS at 09:39

## 2021-01-12 RX ADMIN — MEROPENEM 1 G: 1 INJECTION, POWDER, FOR SOLUTION INTRAVENOUS at 02:11

## 2021-01-12 RX ADMIN — HYDROCORTISONE: 10 CREAM TOPICAL at 21:23

## 2021-01-12 RX ADMIN — PANTOPRAZOLE SODIUM 40 MG: 40 TABLET, DELAYED RELEASE ORAL at 09:23

## 2021-01-12 RX ADMIN — LORAZEPAM 1 MG: 1 TABLET ORAL at 21:25

## 2021-01-12 RX ADMIN — PANTOPRAZOLE SODIUM 40 MG: 40 TABLET, DELAYED RELEASE ORAL at 17:49

## 2021-01-12 RX ADMIN — LEVETIRACETAM 1500 MG: 500 TABLET, FILM COATED ORAL at 21:25

## 2021-01-12 RX ADMIN — HYDROCODONE BITARTRATE AND ACETAMINOPHEN 2 TABLET: 5; 325 TABLET ORAL at 18:34

## 2021-01-12 RX ADMIN — ATORVASTATIN CALCIUM 20 MG: 20 TABLET, FILM COATED ORAL at 09:22

## 2021-01-12 RX ADMIN — LEVETIRACETAM 1500 MG: 500 TABLET, FILM COATED ORAL at 10:46

## 2021-01-12 RX ADMIN — Medication 3 MG: at 23:12

## 2021-01-12 RX ADMIN — MEROPENEM 1 G: 1 INJECTION, POWDER, FOR SOLUTION INTRAVENOUS at 17:46

## 2021-01-12 RX ADMIN — MEROPENEM 1 G: 1 INJECTION, POWDER, FOR SOLUTION INTRAVENOUS at 09:39

## 2021-01-12 ASSESSMENT — ENCOUNTER SYMPTOMS
CONSTIPATION: 0
PHOTOPHOBIA: 0
TROUBLE SWALLOWING: 0
VOMITING: 0
DIARRHEA: 0
WHEEZING: 0
COLOR CHANGE: 0
SORE THROAT: 0
RHINORRHEA: 0
ABDOMINAL PAIN: 0
CHOKING: 0
NAUSEA: 0
SHORTNESS OF BREATH: 0
CHEST TIGHTNESS: 0
ABDOMINAL DISTENTION: 0
STRIDOR: 0
COUGH: 0

## 2021-01-12 ASSESSMENT — PAIN DESCRIPTION - LOCATION: LOCATION: BACK

## 2021-01-12 ASSESSMENT — PAIN SCALES - GENERAL: PAINLEVEL_OUTOF10: 7

## 2021-01-12 ASSESSMENT — PAIN DESCRIPTION - ORIENTATION: ORIENTATION: LOWER

## 2021-01-12 ASSESSMENT — PAIN DESCRIPTION - DESCRIPTORS: DESCRIPTORS: DISCOMFORT

## 2021-01-12 NOTE — PROGRESS NOTES
Patient arrived from in-house bed, consent signed, all questions answered. Pt ready for procedure. Bed in low position, call light to reach with side rails up 2 of 2. Bilat groin areas clipped per She Lamas RN. No one at bedside with patient.

## 2021-01-12 NOTE — PROGRESS NOTES
[facility]   Progress Note                   Date:   1/12/2021  Patient name: Trupti Arguello [facility]  Date of admission:  1/4/2021  4:09 PM  MRN:   [id number]  YOB: 1965  PCP: No primary care provider on file. Reason for Admission: Status epilepticus ([address] [G40.901]    Subjective:       Clinical Changes / Abnormalities: Patient seen and examined. Denies chest pain or SOB. No CV concerns overnight. SR on tele. Labs, vitals, & tele reviewed. Medications:   Scheduled Meds:   levETIRAcetam  1,500 mg Oral Q12H    hydrocortisone   Topical BID    lisinopril  2.5 mg Oral Daily    metoprolol succinate  25 mg Oral Daily    aspirin  81 mg Oral Daily    pantoprazole  40 mg Oral BID AC    [Held by provider] enoxaparin  40 mg Subcutaneous Daily    potassium bicarb-citric acid  40 mEq Oral Daily    meropenem  1 g Intravenous Q8H    sodium chloride flush  10 mL Intravenous 2 times per day    ferrous sulfate  325 mg Oral Daily with breakfast    sertraline  50 mg Oral Daily    atorvastatin  20 mg Oral Daily     Continuous Infusions:    CBC:   Recent Labs     01/10/21  0535 01/11/21  0938 01/12/21  0441   WBC 10.5 9.8 8.9   HGB 9.9* 10.4* 9.9*   * 676* 654*     BMP:    Recent Labs     01/10/21  0535 01/11/21  0938 01/12/21  0441   * 140 132*   K 4.1 4.5 4.4    105 102   CO2 21 25 24   BUN 10 11 15   CREATININE 0.32* 0.29* 0.35*   GLUCOSE 127* 132* 120*     Hepatic:   Recent Labs     01/10/21  0535 01/11/21  0938 01/12/21  0441   AST 10 12 13   ALT 5 6 7   BILITOT <0.10* <0.10* <0.10*   ALKPHOS 68 75 73     Troponin:   No results for input(s): TROPHS in the last 72 hours. BNP: No results for input(s): BNP in the last 72 hours. Lipids: No results for input(s): CHOL, HDL in the last 72 hours. Invalid input(s): LDLCALCU  INR: No results for input(s): INR in the last 72 hours.     Objective:   Vitals: /67   Pulse 86   Temp 97.8 °F (36.6 °C) (Oral)   Resp 16 Ht 5' 6\" (1.676 m)   Wt 166 lb 7.2 oz (75.5 kg)   SpO2 94%   BMI 26.87 kg/m²   General appearance: intubated and sedated   HEENT: Head: Normocephalic, no lesions, without obvious abnormality. Neck: no JVD, trachea midline, no adenopathy  Lungs: Clear to auscultation throughout, mild anterior rhonchi. No rales. NC oxygen    Heart: Regular rate and rhythm, s1/s2 auscultated, no murmurs. SR/ST  Abdomen: soft, non-tender, bowel sounds active  Extremities: no edema  Neurologic: not done    EKG:    Date: 01/05/21  Reading: This tachycardia, inverted P waves in V1     LAST ECHO:  Date: 8/20  Findings Summary: Normal LV size, normal systolic function. LVEF 60 to 65%. No regional wall motion abnormalities. Mild concentric LVH. No valvular regurgitation or stenosis. Echo 1/6/21  Summary  Left ventricle is normal in size. Global left ventricular systolic function is mild to moderately reduced. Estimated ejection fraction is 40 % . The apex and apical segments appear severely hypokinetic. The basal segments  appear to be functioning normal.  Abnormal global L. strain of -11 %. Mild left ventricular hypertrophy. Grade I (mild) left ventricular diastolic dysfunction. Mild aortic insufficiency. Trivial mitral regurgitation. Trivial tricuspid regurgitation. Small to mild circumferential pericardial effusion, with no  echocardiographic tamponade. Assessment / Acute Cardiac Problems:   1. NSTEMI    2. New onset seizure  3. Stage 4 ovarian CA   4. Splenic abscess on IV ATB  5. Hypokalemia  6. Hypomagnesium  7.  CMP with LVEF on recent echo down to 40% with significant WMA    Patient Active Problem List:     Malignant neoplasm of ovary (HCC)     Seizure ([address]     Type 2 diabetes mellitus without complication, without long-term current use of insulin (HCC)     Anemia     Splenic lesion     Ovarian cancer ([address]     Acute encephalopathy     PRES (posterior reversible encephalopathy syndrome)     Hemianopia, bitemporal     Encephalopathy     Status epilepticus ([facility]     History of malignant neoplasm of ovary      Plan of Treatment:   1. NSTEMI. Stable and without chest pain or SOB presently. Appreciate hematology, ID, and neurology review and clearance for cath. Patient agrees to proceed with heart CATH. NPO and afebrile  2. Emotional support provided  3. Continue PO ASA, statin, low dose BB & ACE    4. Keep K > 4 and Mag > 2 . Stable.      Electronically signed by ESTRELLITA Bernal NP on 1/12/2021 at 12:42 PM  [address]  [phone]

## 2021-01-12 NOTE — PROGRESS NOTES
Occupational Therapy Not Seen Note    DATE: 2021  Name: Chao Kidd  : 1965  MRN: [id number]    Patient not available for Occupational Therapy due to:    Pt off the unit for testing.     Next Scheduled Treatment: 2021    Electronically signed by BAYRON Mahan on 2021 at 2:30 PM

## 2021-01-12 NOTE — OP NOTE
[facility]        Date:   1/12/2021  Patient name:  Vandana May [facility]  Date of admission:  1/4/2021  4:09 PM  MRN:   [id number]  YOB: 1965    CARDIAC CATHETERIZATION      Operators:    Laura Ellison MD    CV Fellow:  Francisco Zaragoza M.D. Procedure performed:       [x] Left Heart Catheterization. [] Graft Angiography. [x] Left Ventriculography. [] Right Heart Catheterization. [x] Coronary Angiography. [] Aortic Valve Studies. [] PCI:      [] Other:         Pre Procedure Conscious Sedation Data:    ASA Class:    [] I [x] II [] III [] IV    Mallampati Class:  [] I [x] II [] III [] IV        Indication:  [] STEMI      [] + Stress test  [] ACS      [] + EKG Changes  [] Non Q MI       [] Significant Risk Factors  [] Recurrent Angina             [] Diabetes Mellitus    [] New LBBB      [] Uncontrolled HTN. [x] CHF / Low EF changes     [] Abnormal CTA / Ca Score  [] Other:       Procedure:  Access:  [] Femoral  [x] Radial  artery       [x] Right  [] Left    After informed consent was obtained with explanation of the risks and benefits, patient was brought to the cath lab. The right wrist was prepped and draped in sterile fashion. 1% lidocaine was used for local block. The  right radial artery was cannulated with 6  Fr sheath with brisk arterial blood return. A premixed injection of Verapamil, Heparin and Nitrogycerin was injected through the side port. The side port was frequently flushed and aspirated with normal saline. Findings:  LMCA: Normal 0% stenosis.     LAD: Mild irregularities 10-20%. D1: Ostial 30-40% stenosis     LCx: Mild irregularities 10-20%.     RCA: Mild irregularities 10-20%. PDA: had mid stenosis 70% stenosis, small  vessel.      Coronary Tree      Dominance: Right    The LV gram was performed in the KNOWLES 30 position. LVEF: 50%.     Estimated blood loss: 5 ml    Conclusions:      Single vessel CAD (Small PDA)   Lower normal LV function Recommendations:  1. Medical Therapy. 2. Risk Factors Modification. 3. Post Cath Protocol        History and Risk Factors    [] Hypertension     [] Family history of CAD  [x] Hyperlipidemia     [] Cerebrovascular Disease   [] Prior MI       [] Peripheral Vascular disease   [] Prior PCI              [] Diabetes Mellitus    [] Left Main PCI. [] Currently on Dialysis. [] Prior CABG. [x] Currently smoker. [] Cardiac Arrest outside of healthcare facility. [] Yes    [x] No        Witnessed     [] Yes   [] No     Arrest after arrival of EMS  [] Yes   [] No     [] Cardiac Arrest at other Facility. [] Yes   [] No    Pre-Procedure Information. Heart Failure       [x] Yes    [x] No        Class  [] I      [x] II  [] III    [] IV. New Diagnosis    [x] Yes  [] No    HF Type      [x] Systolic   [] Diastolic          [] Unknown. Diagnostic Test:   EKG       [] Normal   [] Abnormal    New antiarrhythmia medications:    [] Yes   [] No   New onset atrial fibrillation / Flutter     [] Yes   [] No   ECG Abnormalities:      [] V. Fib   [] Karen V. Tach           [] NS V. T   [] New LBBB           [] T. Inv  []  ST dev > 0.5 mm         [] PVC's freq  [] PVC's infrequent    Stress Test Performed:   [] Yes    [x] No     Type:     [] Stress Echo   [] Exercise Stress Test (no imaging)      [] Stress Nuclear  [] Stress Imaging     Results   [] Negative   [] Positive        [] Indeterminate  [] Unavailable     If Positive/ Risk / Extent of Ischemia:       [] Low  [] Intermediate         [] High  [] Unavailable      Cardiac CTA Performed:  [] Yes    [x] No      Results   [] CAD   [] Non obstructive CAD      [] No CAD   [] Uncertain      [] Unknown   [] Structural Disease.      Pre Procedure Medications: [x] Yes    [] No         [x] ASA  [x] Beta Blockers      [] Nitrate  [] Ca Channel Blockers      [] Ranolazine  [] Statin       [] Plavix/Others antiplatelets      Yvonne Aldana MD  Fellow, Cardiovascular Diseases [facility]              I have reviewed the case / procedure with resident / fellow  I have examined the patient personally  Patient agree with treatment plan as discussed before, final arrangement based on my evaluation and exam.    Risk and benefit of procedure planned were explained in details. Procedure was performed by me personally, with all aspect of the procedure being done using standard protocol. Note was modified based on my own assessment and treatment.     Lissette Greene MD  [facility]

## 2021-01-12 NOTE — PROGRESS NOTES
[facility] ([facility]) Neurology Specialist  [address]  [place], [address] [facility]:  [phone] or [phone]  FAX:  [phone]            Brief history: Huang Mendez is a 60 y.o. old female admitted on 1/4/2021 with seizure        Subjective: No new neurological events overnight. Patient denies any new weakness, numbness, tingling or headache.      Objective: /61   Pulse 83   Temp 96.8 °F (36 °C) (Oral)   Resp 16   Ht 5' 6\" (1.676 m)   Wt 166 lb 7.2 oz (75.5 kg)   SpO2 94%   BMI 26.87 kg/m²       Medications:    levETIRAcetam  1,500 mg Oral Q12H    sodium chloride flush  10 mL Intravenous 2 times per day    hydrocortisone   Topical BID    lisinopril  2.5 mg Oral Daily    metoprolol succinate  25 mg Oral Daily    aspirin  81 mg Oral Daily    pantoprazole  40 mg Oral BID AC    [Held by provider] enoxaparin  40 mg Subcutaneous Daily    potassium bicarb-citric acid  40 mEq Oral Daily    meropenem  1 g Intravenous Q8H    sodium chloride flush  10 mL Intravenous 2 times per day    ferrous sulfate  325 mg Oral Daily with breakfast    sertraline  50 mg Oral Daily    atorvastatin  20 mg Oral Daily        Neurological examination:    Mental status   Alert and oriented; intact memory with no confusion, speech or language problems; no hallucinations or delusions     Cranial nerves   II - visual fields intact to confrontation                                                III, IV, VI  extra-ocular muscles full: no pupillary defect; no JOSE A, no nystagmus, no ptosis                                                                      V - normal facial sensation                                                               VII - normal facial symmetry                                                             VIII - intact hearing IX, X - symmetrical palate                                                                  XI - symmetrical shoulder shrug                                                       XII - midline tongue without atrophy or fasciculation     Motor function  Normal muscle bulk and tone; normal power 5/5, including fine motor movements     Sensory function Intact to touch, pin, vibration, proprioception     Cerebellar Intact fine motor movement. No involuntary movements or tremors     Reflex function Intact 2+ DTR and symmetric. Negative Babinski     Gait                  Not tested         No results found for: LDLCALC, LDLCHOLESTEROL, LDLDIRECT  No components found for: CHLPL  No results found for: TRIG  No results found for: HDL  No results found for: LDLCALC  No results found for: LABVLDL  Lab Results   Component Value Date    LABA1C 6.0 01/09/2021     Lab Results   Component Value Date     01/09/2021     Lab Results   Component Value Date    [id number] 405 10/22/2020      Neurological work up:  CT head  CTA head and neck  MRI brain on 5/20/2021 with no acute intracranial abnormality  MRI brain oct 2020    2 D echo     Assessment and Recommendations:   Breakthrough seizure secondary to medication noncompliance  Previous history of posterior reversible encephalopathy syndrome October 2020 with subsequent seizures and was supposed to take Keppra 1500 mg twice daily  Other comorbid conditions include history of having carcinoma    The patient did not have any further seizure. Recommend continued Keppra 1.5 g twice daily  Patient to have cardiac cath tomorrow  Patient to follow-up with neurology next 4 to 6 weeks  We will sign off. Please call with questions. Thank you for the consult.     Lupillo Blair MD  Neurology This note is created with the assistance of a speech-recognition program. While intending to generate a document that actually reflects the content of the visit, the document can still have some errors including those of syntax and sound a- like substitutions which may escape proofreading. In such instances, actual meaning can be extrapolated by contextual derivation.

## 2021-01-12 NOTE — PROGRESS NOTES
Progress Note               Date:                           1/12/2021  Patient name:           Mary Garcia [facility]  Date of admission:  1/4/2021  4:09 PM  MRN:   [id number]  YOB: 1965  PCP:                           No primary care provider on file. Subjective:   Patient was seen and examined. Clinically stable. No events overnight. No headaches. No seizures. No weakness or numbness of the extremities. Patient went for cardiac catheterization this a.m. Single-vessel CAD. Stable post procedure. No chest pain or shortness of breath. No fever. No other complaints. Review of systems :  Constitutional: No fever or chills. No night sweats, no weight loss   Eyes: No eye discharge, double vision, or eye pain   HEENT: negative for sore mouth, sore throat, hoarseness and voice change   Respiratory: negative for cough , sputum, dyspnea, wheezing, hemoptysis, chest pain   Cardiovascular: negative for chest pain, dyspnea, palpitations, orthopnea, PND   Gastrointestinal: negative for nausea, vomiting, diarrhea, constipation, abdominal pain, Dysphagia, hematemesis and hematochezia   Genitourinary: negative for frequency, dysuria, nocturia, urinary incontinence, and hematuria   Integument: negative for rash, skin lesions, bruises. Hematologic/Lymphatic: negative for easy bruising, bleeding, lymphadenopathy, or petechiae   Endocrine: negative for heat or cold intolerance,weight changes, change in bowel habits and hair loss   Musculoskeletal: Complains of back pain however has a rectal tube inserted. Neurological:  headache improved.  No motor or sensory deficit     HPI in Brief:   The patient is a 60 y.o.  female who is admitted to the hospital for uncontrolled seizures. She is intubated. Seizures subsided.   Pt is known to us with metastatic ovarian cancer on Doxil and avastin (recently held due to Gi bleeding, chemo was started 9/2020 She has been in a nd out of the hospital for recurrent seizures. brain imaging showed no mets  ( done at [facility]?)   Pt is intubated in ER. No bleeding now LTME in place, no current seizures      Past Medical History:   has a past medical history of Anemia, Bleeding, Cervical cancer ([facility] [address] Depression, Diabetes mellitus ([facility] [address] GERD (gastroesophageal reflux disease), Metastatic cancer ([facility] [address] Ovarian cancer ([facility] [address] Post chemo evaluation, and Splenic lesion.     Past Surgical History:   has a past surgical history that includes Hysterectomy, total abdominal; Port Surgery; Tonsillectomy; IR PORT PLACEMENT > 5 YEARS (8/24/2020); Anus surgery; Abscess Drainage (2013); colectomy (03/2013); and IR EMBOLIZATION HEMORRHAGE (10/05/2020). Medications:   Reviewed in Epic     Objective:   Vitals: /61   Pulse 83   Temp 96.8 °F (36 °C) (Oral)   Resp 16   Ht 5' 6\" (1.676 m)   Wt 166 lb 7.2 oz (75.5 kg)   SpO2 94%   BMI 26.87 kg/m²       General appearance -alert oriented. Able to converse appropriately. Off supplemental oxygen. Eyes - pupils equal and reactive,  Ears - bilateral TM's and external ear canals normal  Mouth - mucous membranes moist  Neck - supple, no significant adenopathy  Chest - scattered rhonchi   Heart - normal rate, regular rhythm, normal S1, S2,   Abdomen - soft, nontender, nondistended, no masses or organomegaly  Neurological - sedated and intubated. Extremities - peripheral pulses normal, no pedal edema, no clubbing or cyanosis  Skin - pale, no bleeding. Data:  No intake/output data recorded.   In: 200 [P.O.:100]  Out: -   CBC:   Recent Labs     01/10/21  0535 01/11/21  0938 01/12/21  0441   WBC 10.5 9.8 8.9   HGB 9.9* 10.4* 9.9*   * 676* 654*     BMP:    Recent Labs     01/10/21  0535 01/11/21  0938 01/12/21  0441   * 140 132*   K 4.1 4.5 4.4    105 102   CO2 21 25 24   BUN 10 11 15   CREATININE 0.32* 0.29* 0.35*   GLUCOSE 127* 132* 120*     Hepatic:   Recent Labs 01/10/21  0535 01/11/21  0938 01/12/21  0441   AST 10 12 13   ALT 5 6 7   BILITOT <0.10* <0.10* <0.10*   ALKPHOS 68 75 73     INR: No results for input(s): INR in the last 72 hours. IMAGING DATA:    Problem Lists:   Primary Problem:  <principal problem not specified>   Current Problems:  Active Hospital Problems    Diagnosis Date Noted    History of malignant neoplasm of ovary [Z85.43]     Status epilepticus ([facility] [address] [G40.901] 01/04/2021    Seizure ([address] [R56.9] 10/21/2020     PMH:  Past Medical History:   Diagnosis Date    Anemia     Bleeding 10/2020    intra-abdominal bleeding -due to splenic mass with GI infiltration. Status post embolization    Cervical cancer ([address]     Depression     Diabetes mellitus ([facility] [address]     GERD (gastroesophageal reflux disease)     Metastatic cancer ([facility] [address] 10/2020    extensive intraabdominal and splenic involvement and lung mets.  Ovarian cancer ([facility] [address]     low grade serous ovarian carcinoma    Post chemo evaluation     2007: Chemo via med onc (Dr. Gail Hunter), 2008: [place] [facility] due to rising CA-125, 2013: intraperitoneal chemo,12/2015: Ca125 - 25     Splenic lesion       Allergies: Allergies   Allergen Reactions    Ceftriaxone      Had a rash on ceftriaxone December 2020, [facility]      Assessment    1. Metastatic ovarian cancer. 2. Stage IV disease with diffuse metastasis per CT. 3. Recurrent seizures. 4. Acute respiratory failure secondary to status epilepticus likely from metastasis from underlying malignancy. 5. Exudative pleural effusion. Plan     1. Discussed with the patient and her family. 2. Clinically she is significantly better and seizures are controlled. Discussed further treatment for her ovarian cancer. Supportive care versus chemotherapy treatment. 3. Patient and family are interested in more active treatment. Will respect patient's wishes. This will be addressed further as outpatient based on her performance. 4. Status post cardiac catheterization for STEMI with nonobstructive CAD. No objection to use of DAPT. Discussed options of chemotherapy with her. We will follow her up outpatient and proceed with the same per patient's wishes. She will likely qualify for use of anthracycline agents as needed. 5. We will follow. Ashvin Lucas M.D. Internal Medicine Resident , PGY-3  [address] [place] [address]  01/12/21 5:42 PM      This note is created with the assistance of a speech recognition program.  While intending to generate a document that actually reflects the content of the visit, the document can still have some errors including those of syntax and sound a like substitutions which may escape proof reading. It such instances, actual meaning can be extrapolated by contextual diversion. Attending Physician Statement   I have discussed the care of Joe Cat, including pertinent history and exam findings with the resident. I have reviewed the key elements of all parts of the encounter with the resident. I have seen and examined the patient with the resident. I agree with the assessment and plan and status of the problem list as documented.                                [address] [facility]

## 2021-01-12 NOTE — PLAN OF CARE
Problem: Health Behavior:  Goal: Ability to manage health-related needs will improve  Description: Ability to manage health-related needs will improve  Outcome: Met This Shift     Problem: Physical Regulation:  Goal: Signs of adequate cerebral perfusion will increase  Description: Signs of adequate cerebral perfusion will increase  Outcome: Met This Shift  Goal: Ability to maintain a stable neurologic state will improve  Description: Ability to maintain a stable neurologic state will improve  Outcome: Met This Shift     Problem: Safety:  Goal: Ability to remain free from injury will improve  Description: Ability to remain free from injury will improve  Outcome: Met This Shift  Note: No seizure like activity this shift. Remains free from injury. Safety precautions in place. Fall precatiouns in place. Problem: Skin Integrity:  Goal: Will show no infection signs and symptoms  Description: Will show no infection signs and symptoms  Outcome: Met This Shift  Goal: Absence of new skin breakdown  Description: Absence of new skin breakdown  Outcome: Met This Shift     Problem: Falls - Risk of:  Goal: Will remain free from falls  Description: Will remain free from falls  1/12/2021 0552 by Dmitry Stein RN  Outcome: Met This Shift  Note: Patient remained free from injury. Patient verbalized understanding of need for the safety precautions. Demonstrates proper use of assistive devices. Bed remains in the lowest position. Call light remains within reach. [place] [employer] in use.     1/11/2021 1621 by Curtis Lagunas RN  Outcome: Met This Shift  Note: Fall risk precautions in place. Bed in lowest position with wheels locked, bed alarm in place and activated, non-skid socks on pt, fall risk id on pt, call light in reach, pt encouraged to call before getting out of bed and for any other needs or c/o.     Goal: Absence of physical injury  Description: Absence of physical injury  Outcome: Met This Shift     Problem: Pain: Goal: Pain level will decrease  Description: Pain level will decrease  Outcome: Ongoing  Note: Pain level assessment complete. Pt rated pain at 7/10. Pt educated on pain scale and control interventions. PRN pain medication given per pt request. Pt instructed to call out with new onset of pain or unrelieved pain. Will continue to monitor.      Goal: Control of acute pain  Description: Control of acute pain  Outcome: Ongoing

## 2021-01-12 NOTE — CONSULTS
[facility]  [facility], [address] [facility]  Ph: [phone] or [phone]  FAX: [phone]        Reason for consult: Breakthrough seizure    I had the pleasure of seeing your patient in neurology consultation for her symptoms. As you would recall Huang Mendez is a 60 y.o. yo female admitted on 1/4/2021. The patient was at her baseline until 1/4/2021 when she had a witnessed generalized tonic-clonic seizure. The patient has a past history of seizures since October 2020 secondary to posterior reversible encephalopathy syndrome and was supposed to be on Keppra 1.5 g twice daily. The patient states that he was noncompliant with the medication. Initially she was taken to [facility] ER with status epilepticus. The patient was intubated. She was subsequently intubated and has been transferred to the floor. Cardiology has been consulted who recommended cardiac cath. The patient has a past history of adenocarcinoma for which she is being seen by hematology oncology. Past Medical History:   Diagnosis Date    Anemia     Bleeding 10/2020    intra-abdominal bleeding -due to splenic mass with GI infiltration. Status post embolization    Cervical cancer ([address]     Depression     Diabetes mellitus ([address]     GERD (gastroesophageal reflux disease)     Metastatic cancer ([address] 10/2020    extensive intraabdominal and splenic involvement and lung mets.     Ovarian cancer ([address]     low grade serous ovarian carcinoma    Post chemo evaluation     2007: Chemo via med onc (Dr. Keven Espinosa), 2008: Jose Blunt due to rising CA-125, 2013: intraperitoneal chemo,12/2015: Ca125 - 25     Splenic lesion      Past Surgical History:   Procedure Laterality Date    ABSCESS DRAINAGE  2013    Franca rectal    ANUS SURGERY      ANAL FISSURECTOMY    COLECTOMY  03/2013    ex lap, tumor debulking, transverse colectomy w reanastamosis, subgastric omentectomy, intraperitoneal port placement  HYSTERECTOMY, TOTAL ABDOMINAL      IR EMBOLIZATION HEMORRHAGE  10/05/2020    intra-abdominal bleeding -due to splenic mass with GI infiltration. Status post embolization [employer] interlock coils x7. mri condtional 3t ok, safe immediately post implant.     IR PORT PLACEMENT EQUAL OR GREATER THAN 5 YEARS  8/24/2020    IR PORT PLACEMENT EQUAL OR GREATER THAN 5 YEARS 8/24/2020 Jossie Pressley MD [facility]    PORT SURGERY      IP Port    TONSILLECTOMY       Social History     Socioeconomic History    Marital status: Single     Spouse name: Not on file    Number of children: Not on file    Years of education: Not on file    Highest education level: Not on file   Occupational History    Not on file   Social Needs    Financial resource strain: Not on file    Food insecurity     Worry: Not on file     Inability: Not on file    Transportation needs     Medical: Not on file     Non-medical: Not on file   Tobacco Use    Smoking status: Current Every Day Smoker     Packs/day: 1.00    Smokeless tobacco: Current User   Substance and Sexual Activity    Alcohol use: Not Currently    Drug use: Never    Sexual activity: Not on file   Lifestyle    Physical activity     Days per week: Not on file     Minutes per session: Not on file    Stress: Not on file   Relationships    Social connections     Talks on phone: Not on file     Gets together: Not on file     Attends Judaism service: Not on file     Active member of club or organization: Not on file     Attends meetings of clubs or organizations: Not on file     Relationship status: Not on file    Intimate partner violence     Fear of current or ex partner: Not on file     Emotionally abused: Not on file     Physically abused: Not on file     Forced sexual activity: Not on file   Other Topics Concern    Not on file   Social History Narrative    Not on file     Family History   Problem Relation Age of Onset    Alcohol Abuse Mother  Cirrhosis Mother       Allergies   Allergen Reactions    Ceftriaxone      Had a rash on ceftriaxone December 2020, [place]      /69   Pulse 91   Temp 98.1 °F (36.7 °C) (Oral)   Resp 16   Ht 5' 6\" (1.676 m)   Wt 166 lb 7.2 oz (75.5 kg)   SpO2 96%   BMI 26.87 kg/m²      ROS:  Constitutional Negative for fever and chills   HEENT Negative for ear discharge, ear pain, nosebleed   Eyes Negative for photophobia, pain and discharge   Respiratory Negative for hemoptysis and sputum   Cardiovascular Negative for orthopnea, claudication and PND   Gastrointestinal Negative for abdominal pain, diarrhea, blood in stool   Musculoskeletal Negative for joint pain, negative for myalgia   Skin Negative for rash or itching   Endo/heme/allergies Negative for polydipsia, environmental allergy   Psychiatric/behavioral Negative for suicidal ideation.   Patient is not anxious   General examination:    Head: Normocephalic, atraumatic  Eyes: Extraocular movements intact  Lungs: Respirations unlabored, chest wall no deformity  ENT: Normal external ear canals, no sinus tenderness  Heart: Regular rate rhythm  Abdomen: No masses, tenderness  Extremities: No cyanosis or edema, 2+ pulses  Skin: Intact, normal skin color    Neurological examination:    Mental status   Alert and oriented; intact memory with no confusion, speech or language problems; no hallucinations or delusions     Cranial nerves   II - visual fields intact to confrontation                                                III, IV, VI  extra-ocular muscles full: no pupillary defect; no JOSE A, no nystagmus, no ptosis                                                                      V - normal facial sensation                                                               VII - normal facial symmetry                                                             VIII - intact hearing IX, X - symmetrical palate                                                                  XI - symmetrical shoulder shrug                                                       XII - midline tongue without atrophy or fasciculation     Motor function  Normal muscle bulk and tone; normal power 5/5, including fine motor movements     Sensory function Intact to touch, pin, vibration, proprioception     Cerebellar Intact fine motor movement. No involuntary movements or tremors     Reflex function Intact 2+ DTR and symmetric. Negative Babinski     Gait                  Not tested         No results found for: LDLCALC, LDLCHOLESTEROL, LDLDIRECT  No components found for: CHLPL  No results found for: TRIG  No results found for: HDL  No results found for: LDLCALC  No results found for: LABVLDL  Lab Results   Component Value Date    LABA1C 6.0 01/09/2021     Lab Results   Component Value Date     01/09/2021     Lab Results   Component Value Date    [id number] 405 10/22/2020      Neurological work up:  CT head  CTA head and neck  MRI brain on 5/20/2021 with no acute intracranial abnormality  MRI brain oct 2020    2 D echo     Assessment and Recommendations:   Breakthrough seizure secondary to medication noncompliance  Previous history of posterior reversible encephalopathy syndrome October 2020 with subsequent seizures and was supposed to take Keppra 1500 mg twice daily  Other comorbid conditions include history of having carcinoma    The patient did not have any further seizure. Recommend continued Keppra 1500 mg twice daily. We will change Keppra to p.o. Will discuss with infectious disease if meropenem can be changed to a different antibiotic due to history of seizures  No neurological contraindication for cardiac cath  We will follow.   Thank you for the consult      Teri Troy MD  Neurology This note is created with the assistance of a speech-recognition program. While intending to generate a document that actually reflects the content of the visit, the document can still have some errors including those of syntax and sound a- like substitutions which may escape proofreading. In such instances, actual meaning can be extrapolated by contextual derivation.

## 2021-01-12 NOTE — PROGRESS NOTES
[facility]  Internal Medicine Teaching Residency Program  Inpatient Daily Progress Note  ______________________________________________________________________________    [facility]  YOB: 1965   ZPU:[id number]    Acct: [de-identified]     Room: [address] [place] [address]  Admit date: 1/4/2021  Today's date: 01/12/21  Number of days in the hospital: 8    SUBJECTIVE   Admitting Diagnosis: Status epilepticus  CC: Seizures  Pt examined at bedside. Chart & results reviewed. Vitals stable, afebrile  -Had no acute issues overnight  -Patient is currently on meropenem for splenic abscess as per recommendations of the ID team, likely discharge on Invanz 1 g daily x 30 days  Cardiac cath today. May need thoracentesis to evaluate pleural fluid, will consult IR.     ROS:  Constitutional:  negative for chills, fevers, sweats  Respiratory:  negative for cough, dyspnea on exertion, hemoptysis, shortness of breath, wheezing  Cardiovascular:  negative for chest pain, chest pressure/discomfort, lower extremity edema, palpitations  Gastrointestinal:  negative for abdominal pain, constipation, diarrhea, nausea, vomiting  Neurological:  negative for dizziness, headache  BRIEF HISTORY 76-year-old female with history of diabetes, GERD, metastatic ovarian cancer that is post bilateral salpingo-oophorectomy, hysterectomy who presented as a transfer from [facility] ER for status epilepticus. Agustin Mclean was found altered by family and upon EMS arrival she was noted to have a witnessed seizure and was given 4 mg of Versed without cessation of seizures. Erik Ercia was an attempted nasal intubation which was unsuccessful and patient was transported to [facility] ED.  Patient was intubated and started on propofol.  Once propofol was started clinical seizure activity had resolved.  Patient was noted to have marked leukocytosis and broad-spectrum antibiotics with Vanco and Zosyn were started.  Patient was also given 5 L of LR for fluid resuscitation.     With regards to seizures, her initial diagnosis was in October 2020 where she was noted to have an MRI concerning for PRES. She was discharged home on Keppra 1500 mg twice daily.     Patient also had a recent visit at [address] [place] [address] she  had splenic embolization and subsequent splenic abscess requiring drain placement.  Was noted to be on Invanz per records.     With regards to her ovarian cancer, she follows up with [facility]:   [facility] was noted to be agitated and biting her ET tube.  She was continued on propofol which was increased to 60 mics due to agitation. Patient was admitted to the neuro ICU.  She was continued on broad-spectrum antibiotics.  ID consult was obtained.  Antibiotics were switched to meropenem.  Preliminary culture with gram-negative rods.  General surgery was consulted with no acute surgical intervention at this time. Tolerated extubation and did well. Heme/onc on board for her metastatic ovarian ca. Transferred to floors for further management.     OBJECTIVE     Vital Signs: /67   Pulse 86   Temp 97.8 °F (36.6 °C) (Oral)   Resp 16   Ht 5' 6\" (1.676 m)   Wt 166 lb 7.2 oz (75.5 kg)   SpO2 94%   BMI 26.87 kg/m²     Temp (24hrs), Av.2 °F (36.8 °C), Min:97.8 °F (36.6 °C), Max:98.7 °F (37.1 °C)    In: 500   Out: 40 [Drains:40]    Physical Exam:    General appearance - alert, in no distress  Mental status - alert, oriented to person, place, and time  Eyes - pupils equal and reactive, extraocular eye movements intact  Mouth - mucous membranes moist, pharynx normal without lesions  Neck - supple, no significant adenopathy  Chest - clear to auscultation, has minimal Rales bilaterally  Heart - normal rate, regular rhythm, normal S1, S2  Abdomen - soft, nontender, nondistended  Neurological - alert, oriented, normal speech, no focal deficits   Extremities - peripheral pulses normal, no pedal edema  Skin - normal coloration and turgor, no rashes     Medications:  Scheduled Medications:    levETIRAcetam  1,500 mg Oral Q12H    hydrocortisone   Topical BID    lisinopril  2.5 mg Oral Daily    metoprolol succinate  25 mg Oral Daily    aspirin  81 mg Oral Daily    pantoprazole  40 mg Oral BID AC    [Held by provider] enoxaparin  40 mg Subcutaneous Daily    potassium bicarb-citric acid  40 mEq Oral Daily    meropenem  1 g Intravenous Q8H    sodium chloride flush  10 mL Intravenous 2 times per day    ferrous sulfate  325 mg Oral Daily with breakfast    sertraline  50 mg Oral Daily    atorvastatin  20 mg Oral Daily     Continuous Infusions:     PRN Medications    HYDROcodone-acetaminophen, 2 tablet, Q6H PRN      melatonin, 3 mg, Nightly PRN      magnesium sulfate, 1 g, PRN      sodium chloride flush, 10 mL, PRN      sodium chloride flush, 10 mL, PRN      promethazine, 12.5 mg, Q6H PRN    Or      ondansetron, 4 mg, Q6H PRN      polyethylene glycol, 17 g, Daily PRN      acetaminophen, 650 mg, Q6H PRN    Or      acetaminophen, 650 mg, Q6H PRN        Diagnostic Labs: CBC:   Recent Labs     01/10/21  0535 01/11/21  0938 01/12/21  0441   WBC 10.5 9.8 8.9   RBC 4.29 4.42 4.29   HGB 9.9* 10.4* 9.9*   HCT 35.0* 35.3* 34.7*   MCV 81.6* 79.9* 80.9*   RDW 25.7* 25.9* 25.9*   * 676* 654*     BMP:   Recent Labs     01/10/21  0535 01/11/21  0938 01/12/21 0441   * 140 132*   K 4.1 4.5 4.4    105 102   CO2 21 25 24   BUN 10 11 15   CREATININE 0.32* 0.29* 0.35*     BNP: No results for input(s): BNP in the last 72 hours. PT/INR: No results for input(s): PROTIME, INR in the last 72 hours. APTT:   No results for input(s): APTT in the last 72 hours. CARDIAC ENZYMES: No results for input(s): CKMB, CKMBINDEX, TROPONINI in the last 72 hours.     Invalid input(s): CKTOTAL;3  FASTING LIPID PANEL:No results found for: CHOL, HDL, TRIG  LIVER PROFILE:   Recent Labs     01/10/21  0535 01/11/21  0938 01/12/21 0441   AST 10 12 13   ALT 5 6 7   BILIDIR <0.08 <0.08 <0.08   BILITOT <0.10* <0.10* <0.10*   ALKPHOS 68 75 73      MICROBIOLOGY:   Lab Results   Component Value Date/Time    CULTURE NO GROWTH 6 DAYS 01/05/2021 08:45 AM    CULTURE NO GROWTH 6 DAYS 01/05/2021 08:45 AM       Imaging:    Ct Head Wo Contrast    Result Date: 1/4/2021 No acute intracranial hemorrhage, mass-effect, midline shift, or abnormal extra-axial collection. In the setting of known malignant neoplasm with seizure, contrast-enhanced brain MRI is recommended to further evaluate for intracranial metastatic disease. Minimal regions of low attenuation within the periventricular, subcortical, and deep white matter, presumably sequelae of chronic microangiopathic ischemic white matter change, though ultimately age indeterminate without prior comparison imaging. Recommend attention on follow-up. Heterogeneous mineralization is suggested within the calvarium, clivus, and partially imaged upper cervical spine, suspicious for osseous metastatic disease in the setting of a known primary neoplasm. Attention on follow-up MRI. Air-fluid levels with aerated secretions in the sphenoid sinuses and ethmoid air cells, which may relate to intubated status versus underlying pre-existing acute sinusitis. Xr Chest Portable    Result Date: 1/7/2021  Support lines, as detailed Interval progression in now moderate vascular congestion Stable moderate left basilar opacity related to combined pleural-parenchymal process with slight progression in mild streaky right basilar atelectasis     Xr Chest Portable    Result Date: 1/6/2021  Overall stable examination. Cardiomegaly and pulmonary vascular congestion. Small left pleural effusion versus pleural scarring. Xr Chest Portable    Result Date: 1/5/2021  Stable chest Stable support lines Possible mild edema with moderate left perihilar, left basilar opacity related to combined pleuroparenchymal process     Xr Chest Portable    Result Date: 1/4/2021  ET tube as above. Life support apparatus otherwise stable. Mild-to-moderate edema and small left effusion unchanged.      Xr Chest Portable    Result Date: 1/4/2021

## 2021-01-12 NOTE — PROGRESS NOTES
Physical Therapy  DATE: 2021    NAME: Caterina Bolton  MRN: [id number]   : 1965    Patient not seen this date for Physical Therapy due to:  [] Blood transfusion in progress  [] Hemodialysis  [] Patient Declined  [] Spine Precautions   [] Strict Bedrest  [x] Surgery/ Procedure: Cardiac cath then IR  [] Testing      [] Other        [] PT is being discontinued at this time. Patient independent. No further needs. [] PT is being discontinued at this time due to declining physical/ medical status. Therapy is not appropriate at this time.     Zakiya Atkins, PT

## 2021-01-12 NOTE — PROGRESS NOTES
[facility] -   Infectious diseases evaluation. Progress Note     admission date 1/4/2021    reason for consultation:   Leukocytosis    Impression :   Current:  · Splenic abscess  - drained 12/2020  · Post spleen embolization for mass, 10/2020 STL - E coli bacteroides streptococcus  · STV cx E coli  · Ovarian metastatic cancer  · 1/5/21 - Seizures and status epilepticus  · intubated  · Bandemia  · Rash on ceftriaxone, [facility]  Recommendations   · Keep meropenem for spleen abscess to cover org of STL and the current  E Coli  · DC on Invanz 1 g daily x 30 days  · Repeat US spleen once the pus has dried  · She might need to keep longer the drain till full resolution of the splenic necrosis  · She remains full code, oncology on board    Infection Control Recommendations   · [place] Precautions  · Contact Isolation     Antimicrobial Stewardship Recommendations   · Simplification of therapy  · Targeted therapy    Coordination ofOutpatient Care:   · Estimated Length of IV antimicrobials:  · Patient will need Midline / picc Catheter Insertion:   · Patient will need SNF:  · Patient will need outpatient wound care:     History of Present Illness:     INITIAL HISTORY:    Dwain Eubanks is a 60y.o.-year-old female w ovarian CA who came w status epilepticus, found down w SZ, intubated. Hx ovarian CA w mets to abd and lungs,    Recent GI bleed STL 10/2020, post embolization of the spleen for splenic mass. 12/2020 admitted w left splenic abscess and drained and sent recently on invanz [aspirate showed E. coli sensitive to ceftriaxone, Bacteroides, strep viridans. When the patient was given ceftriaxone he had a rash and allergy reaction, hence it was stopped and he was sent on Invanz]  - left pleural fluid aspirated was then neg-    At admission to [facility],  found w tarry fluid from the NGT and clots in ETT. possibly from nasal attempted intubation - Resolved- She is full code. Left spleen drainage purulent. E coli on culture  Leukocytosis noticed, we are called for opinion. 1/5 left perisplenic fluid aspirated and grew E coli    CURRENT EVALUATION :  1/12/2021   Patient Vitals for the past 8 hrs:   BP Temp Temp src Pulse Resp   01/12/21 1101 112/67 97.8 °F (36.6 °C) Oral 86 16   01/12/21 0811 103/62 98 °F (36.7 °C) Oral 70 16     No acute episodes overnight  Patient is heme stable and afebrile   No headache, seizures, weakness, chest pain, or shortness of breath  Afebrile  VS stable  WBC count trending downward  Drain with cloudy fluid: culture E coli multiS and diphteroids  Tolerating Meropenem    Summary of relevant labs: 1/12/2021    Labs:  WBC 32 -14 -12 - 9.8 - 8.4  Hb 10.4 - 9.9   Plat 654    Micro:  BC 1/5 x 2: No growth    Urine cx 1/5 : No growth     Perisplenic fluid  1/5/21  · cx 1/5 E coli  · LDH 83635  · RBC 67947  · WBC 192806    1/4 sputum negative for malignancy    Imaging:  CT AP 1/5  Interval placement of a percutaneous pigtail catheter left splenic complex   mass. Mesenteric, retroperitoneal, and left inguinal pathologic lymphadenopathy   consistent with metastatic disease. Multiple pulmonary nodules consistent with metastatic disease.  Prominent   right hilar lymph node. New left lower lobe consolidation and effusion. Diffuse bony blastic metastatic disease          MRI brain   Impression:  1. No acute intracranial abnormality. Specifically, no acute infarction, mesial temporal sclerosis, or intracranial metastatic disease. 2. Mild parenchymal volume loss. Sequela of mild chronic microvascular   ischemic changes. CXR 1/5/21  Left basilar opacity and left perihilar infiltrate      I have personally reviewed the past medical history, past surgical history, medications, social history, and family history, and I haveupdated the database accordingly.     Allergies:   Ceftriaxone     Review of Systems:     Review of Systems Constitutional: Positive for activity change. Negative for appetite change, chills, diaphoresis, fatigue, fever and unexpected weight change. HENT: Negative for congestion, drooling, nosebleeds, rhinorrhea, sneezing, sore throat and trouble swallowing. Eyes: Negative for photophobia and visual disturbance. Respiratory: Negative for cough, choking, chest tightness, shortness of breath, wheezing and stridor. Cardiovascular: Negative for chest pain and leg swelling. Gastrointestinal: Negative for abdominal distention, abdominal pain, constipation, diarrhea, nausea and vomiting. Endocrine: Negative for heat intolerance, polydipsia, polyphagia and polyuria. Genitourinary: Negative for decreased urine volume, difficulty urinating, dysuria, flank pain (Some left sided discomfort), frequency, hematuria and urgency. Musculoskeletal: Negative for arthralgias, joint swelling and myalgias. Skin: Negative for color change, rash and wound. Allergic/Immunologic: Negative for immunocompromised state. Neurological: Negative for dizziness, syncope, weakness, light-headedness, numbness and headaches. Hematological: Negative for adenopathy. Psychiatric/Behavioral: Negative for agitation, confusion, self-injury and sleep disturbance. The patient is not nervous/anxious. Physical Examination :     Physical Exam  Constitutional:       General: She is not in acute distress. Appearance: Normal appearance. She is normal weight. She is not ill-appearing or toxic-appearing. HENT:      Head: Normocephalic and atraumatic. Nose: Nose normal.      Mouth/Throat:      Mouth: Mucous membranes are moist.      Pharynx: Oropharynx is clear. No oropharyngeal exudate or posterior oropharyngeal erythema. Eyes:      General: No scleral icterus. Conjunctiva/sclera: Conjunctivae normal.      Pupils: Pupils are equal, round, and reactive to light.    Neck: Musculoskeletal: Neck supple. No muscular tenderness. Cardiovascular:      Rate and Rhythm: Normal rate and regular rhythm. Pulses: Normal pulses. Heart sounds: Normal heart sounds. No murmur. Pulmonary:      Effort: No respiratory distress. Breath sounds: Normal breath sounds. No stridor. No wheezing. Abdominal:      General: There is no distension. Palpations: Abdomen is soft. Tenderness: There is no abdominal tenderness. There is no guarding or rebound. Comments: Left UQ  drain w pus   Genitourinary:     Comments: Urina cristiano  Musculoskeletal: Normal range of motion. General: No swelling. Right lower leg: No edema. Left lower leg: No edema. Skin:     Coloration: Skin is not jaundiced or pale. Findings: No bruising, erythema, lesion or rash. Neurological:      General: No focal deficit present. Mental Status: She is alert and oriented to person, place, and time. Sensory: No sensory deficit. Coordination: Coordination normal.      Deep Tendon Reflexes: Reflexes normal.   Psychiatric:         Mood and Affect: Mood normal.         Behavior: Behavior normal.      Comments: Appropriate        Past Medical History:     Past Medical History:   Diagnosis Date    Anemia     Bleeding 10/2020    intra-abdominal bleeding -due to splenic mass with GI infiltration. Status post embolization    [facility] [address] [address]     Depression     Diabetes mellitus ([address]     GERD (gastroesophageal reflux disease)     Metastatic cancer ([facility] [address] 10/2020    extensive intraabdominal and splenic involvement and lung mets.     Ovarian cancer ([facility] [address]     low grade serous ovarian carcinoma    Post chemo evaluation     2007: Chemo via med onc (Dr. Sabrina Banerjee), 2008: Myrene Hy due to rising CA-125, 2013: intraperitoneal chemo,12/2015: Ca125 - 25     Splenic lesion      Past Surgical  History:     Past Surgical History:   Procedure Laterality Date    ABSCESS DRAINAGE  2013  Sexual activity: Not on file   Lifestyle    Physical activity     Days per week: Not on file     Minutes per session: Not on file    Stress: Not on file   Relationships    Social connections     Talks on phone: Not on file     Gets together: Not on file     Attends Evangelical service: Not on file     Active member of club or organization: Not on file     Attends meetings of clubs or organizations: Not on file     Relationship status: Not on file    Intimate partner violence     Fear of current or ex partner: Not on file     Emotionally abused: Not on file     Physically abused: Not on file     Forced sexual activity: Not on file   Other Topics Concern    Not on file   Social History Narrative    Not on file     Family History:     Family History   Problem Relation Age of Onset    Alcohol Abuse Mother     Cirrhosis Mother       Medical Decision Making:   I have independently reviewed/ordered the following labs:    CBC with Differential:   Recent Labs     01/11/21  0938 01/12/21 0441   WBC 9.8 8.9   HGB 10.4* 9.9*   HCT 35.3* 34.7*   * 654*   LYMPHOPCT 18* 27   MONOPCT 9 9     BMP:  Recent Labs     01/11/21  0938 01/12/21  0441    132*   K 4.5 4.4    102   CO2 25 24   BUN 11 15   CREATININE 0.29* 0.35*   MG 1.9 2.5     Hepatic Function Panel:   Recent Labs     01/11/21  0938 01/12/21  0441   PROT 6.0* 6.2*   LABALBU 2.9* 2.8*   BILIDIR <0.08 <0.08   IBILI CANNOT BE CALCULATED CANNOT BE CALCULATED   BILITOT <0.10* <0.10*   ALKPHOS 75 73   ALT 6 7   AST 12 13     Lab Results   Component Value Date    CREATININE 0.35 01/12/2021    GLUCOSE 120 01/12/2021     Detailed results: Thank you for allowing us to participate in the care of this patient. Please call with questions. This note is created with the assistance of a speech recognition program.  While intending to generate adocument that actually reflects the content of the visit, the document can still have some errors including those of syntax and sound a like substitutions which may escape proof reading. It such instances, actual meaningcan be extrapolated by contextual diversion. Morro Mccullough MD  PGY-1, Internal Medicine Resident  [facility]  1/12/2021 2:56 PM

## 2021-01-13 ENCOUNTER — APPOINTMENT (OUTPATIENT)
Dept: ULTRASOUND IMAGING | Age: 56
DRG: 871 | End: 2021-01-13
Payer: COMMERCIAL

## 2021-01-13 ENCOUNTER — APPOINTMENT (OUTPATIENT)
Dept: GENERAL RADIOLOGY | Age: 56
DRG: 871 | End: 2021-01-13
Payer: COMMERCIAL

## 2021-01-13 PROBLEM — J90 PLEURAL EFFUSION: Status: ACTIVE | Noted: 2021-01-13

## 2021-01-13 LAB
ABSOLUTE EOS #: 0.82 K/UL (ref 0–0.44)
ABSOLUTE IMMATURE GRANULOCYTE: 0.03 K/UL (ref 0–0.3)
ABSOLUTE LYMPH #: 2.11 K/UL (ref 1.1–3.7)
ABSOLUTE MONO #: 0.8 K/UL (ref 0.1–1.2)
ALBUMIN SERPL-MCNC: 3.2 G/DL (ref 3.5–5.2)
ALBUMIN/GLOBULIN RATIO: 0.9 (ref 1–2.5)
ALP BLD-CCNC: 71 U/L (ref 35–104)
ALT SERPL-CCNC: 9 U/L (ref 5–33)
ANION GAP SERPL CALCULATED.3IONS-SCNC: 11 MMOL/L (ref 9–17)
AST SERPL-CCNC: 25 U/L
BASOPHILS # BLD: 1 % (ref 0–2)
BASOPHILS ABSOLUTE: 0.08 K/UL (ref 0–0.2)
BILIRUB SERPL-MCNC: <0.1 MG/DL (ref 0.3–1.2)
BILIRUBIN DIRECT: <0.08 MG/DL
BILIRUBIN, INDIRECT: ABNORMAL MG/DL (ref 0–1)
BUN BLDV-MCNC: 11 MG/DL (ref 6–20)
BUN/CREAT BLD: ABNORMAL (ref 9–20)
CALCIUM IONIZED: 1.14 MMOL/L (ref 1.13–1.33)
CALCIUM SERPL-MCNC: 9 MG/DL (ref 8.6–10.4)
CASE NUMBER:: NORMAL
CHLORIDE BLD-SCNC: 101 MMOL/L (ref 98–107)
CO2: 24 MMOL/L (ref 20–31)
CREAT SERPL-MCNC: 0.36 MG/DL (ref 0.5–0.9)
DIFFERENTIAL TYPE: ABNORMAL
DIRECT EXAM: NORMAL
EOSINOPHILS RELATIVE PERCENT: 11 % (ref 1–4)
GFR AFRICAN AMERICAN: >60 ML/MIN
GFR NON-AFRICAN AMERICAN: >60 ML/MIN
GFR SERPL CREATININE-BSD FRML MDRD: ABNORMAL ML/MIN/{1.73_M2}
GFR SERPL CREATININE-BSD FRML MDRD: ABNORMAL ML/MIN/{1.73_M2}
GLOBULIN: ABNORMAL G/DL (ref 1.5–3.8)
GLUCOSE BLD-MCNC: 115 MG/DL (ref 70–99)
GLUCOSE BLD-MCNC: 117 MG/DL (ref 65–105)
GLUCOSE BLD-MCNC: 133 MG/DL (ref 65–105)
GLUCOSE BLD-MCNC: 136 MG/DL (ref 65–105)
GLUCOSE BLD-MCNC: 97 MG/DL (ref 65–105)
GLUCOSE, FLUID: 118 MG/DL
HCT VFR BLD CALC: 34.4 % (ref 36.3–47.1)
HEMOGLOBIN: 10.2 G/DL (ref 11.9–15.1)
IMMATURE GRANULOCYTES: 0 %
LACTATE DEHYDROGENASE, FLUID: 121 U/L
LYMPHOCYTES # BLD: 28 % (ref 24–43)
Lab: NORMAL
MAGNESIUM: 1.9 MG/DL (ref 1.6–2.6)
MCH RBC QN AUTO: 23.1 PG (ref 25.2–33.5)
MCHC RBC AUTO-ENTMCNC: 29.7 G/DL (ref 28.4–34.8)
MCV RBC AUTO: 78 FL (ref 82.6–102.9)
MONOCYTES # BLD: 11 % (ref 3–12)
NRBC AUTOMATED: 0 PER 100 WBC
PDW BLD-RTO: 26.1 % (ref 11.8–14.4)
PH FLUID: 8
PLATELET # BLD: 568 K/UL (ref 138–453)
PLATELET ESTIMATE: ABNORMAL
PMV BLD AUTO: 9.9 FL (ref 8.1–13.5)
POTASSIUM SERPL-SCNC: 4.5 MMOL/L (ref 3.7–5.3)
RBC # BLD: 4.41 M/UL (ref 3.95–5.11)
RBC # BLD: ABNORMAL 10*6/UL
SEG NEUTROPHILS: 50 % (ref 36–65)
SEGMENTED NEUTROPHILS ABSOLUTE COUNT: 3.8 K/UL (ref 1.5–8.1)
SODIUM BLD-SCNC: 136 MMOL/L (ref 135–144)
SPECIMEN DESCRIPTION: NORMAL
SPECIMEN DESCRIPTION: NORMAL
SPECIMEN TYPE: NORMAL
TOTAL PROTEIN, BODY FLUID: 4.1 G/DL
TOTAL PROTEIN: 6.6 G/DL (ref 6.4–8.3)
WBC # BLD: 7.6 K/UL (ref 3.5–11.3)
WBC # BLD: ABNORMAL 10*3/UL

## 2021-01-13 PROCEDURE — 6370000000 HC RX 637 (ALT 250 FOR IP): Performed by: INTERNAL MEDICINE

## 2021-01-13 PROCEDURE — 99232 SBSQ HOSP IP/OBS MODERATE 35: CPT | Performed by: INTERNAL MEDICINE

## 2021-01-13 PROCEDURE — 85025 COMPLETE CBC W/AUTO DIFF WBC: CPT

## 2021-01-13 PROCEDURE — 0W9B3ZZ DRAINAGE OF LEFT PLEURAL CAVITY, PERCUTANEOUS APPROACH: ICD-10-PCS | Performed by: RADIOLOGY

## 2021-01-13 PROCEDURE — 6360000002 HC RX W HCPCS: Performed by: STUDENT IN AN ORGANIZED HEALTH CARE EDUCATION/TRAINING PROGRAM

## 2021-01-13 PROCEDURE — 2709999900 US THORACENTESIS

## 2021-01-13 PROCEDURE — 36415 COLL VENOUS BLD VENIPUNCTURE: CPT

## 2021-01-13 PROCEDURE — 83735 ASSAY OF MAGNESIUM: CPT

## 2021-01-13 PROCEDURE — 2580000003 HC RX 258: Performed by: INTERNAL MEDICINE

## 2021-01-13 PROCEDURE — 97116 GAIT TRAINING THERAPY: CPT

## 2021-01-13 PROCEDURE — 82330 ASSAY OF CALCIUM: CPT

## 2021-01-13 PROCEDURE — 2060000000 HC ICU INTERMEDIATE R&B

## 2021-01-13 PROCEDURE — 80048 BASIC METABOLIC PNL TOTAL CA: CPT

## 2021-01-13 PROCEDURE — 87015 SPECIMEN INFECT AGNT CONCNTJ: CPT

## 2021-01-13 PROCEDURE — 6360000002 HC RX W HCPCS: Performed by: INTERNAL MEDICINE

## 2021-01-13 PROCEDURE — 80076 HEPATIC FUNCTION PANEL: CPT

## 2021-01-13 PROCEDURE — 71045 X-RAY EXAM CHEST 1 VIEW: CPT

## 2021-01-13 PROCEDURE — 99233 SBSQ HOSP IP/OBS HIGH 50: CPT | Performed by: INTERNAL MEDICINE

## 2021-01-13 PROCEDURE — 82947 ASSAY GLUCOSE BLOOD QUANT: CPT

## 2021-01-13 RX ORDER — METOPROLOL SUCCINATE 25 MG/1
25 TABLET, EXTENDED RELEASE ORAL DAILY
Qty: 30 TABLET | Refills: 3 | Status: SHIPPED | OUTPATIENT
Start: 2021-01-14 | End: 2021-02-05 | Stop reason: SDUPTHER

## 2021-01-13 RX ORDER — LORAZEPAM 2 MG/ML
1 INJECTION INTRAMUSCULAR ONCE
Status: COMPLETED | OUTPATIENT
Start: 2021-01-13 | End: 2021-01-13

## 2021-01-13 RX ORDER — LISINOPRIL 2.5 MG/1
2.5 TABLET ORAL DAILY
Qty: 30 TABLET | Refills: 3 | Status: SHIPPED | OUTPATIENT
Start: 2021-01-14 | End: 2021-02-05 | Stop reason: SDUPTHER

## 2021-01-13 RX ADMIN — LORAZEPAM 1 MG: 2 INJECTION INTRAMUSCULAR; INTRAVENOUS at 21:18

## 2021-01-13 RX ADMIN — PANTOPRAZOLE SODIUM 40 MG: 40 TABLET, DELAYED RELEASE ORAL at 08:58

## 2021-01-13 RX ADMIN — SODIUM CHLORIDE, PRESERVATIVE FREE 10 ML: 5 INJECTION INTRAVENOUS at 08:58

## 2021-01-13 RX ADMIN — POTASSIUM BICARBONATE 40 MEQ: 782 TABLET, EFFERVESCENT ORAL at 08:59

## 2021-01-13 RX ADMIN — LEVETIRACETAM 1500 MG: 500 TABLET, FILM COATED ORAL at 08:59

## 2021-01-13 RX ADMIN — SODIUM CHLORIDE, PRESERVATIVE FREE 10 ML: 5 INJECTION INTRAVENOUS at 21:19

## 2021-01-13 RX ADMIN — MEROPENEM 1 G: 1 INJECTION, POWDER, FOR SOLUTION INTRAVENOUS at 18:22

## 2021-01-13 RX ADMIN — ASPIRIN 81 MG: 81 TABLET, CHEWABLE ORAL at 08:59

## 2021-01-13 RX ADMIN — METOPROLOL SUCCINATE 25 MG: 25 TABLET, FILM COATED, EXTENDED RELEASE ORAL at 08:58

## 2021-01-13 RX ADMIN — LISINOPRIL 2.5 MG: 2.5 TABLET ORAL at 08:58

## 2021-01-13 RX ADMIN — HYDROCODONE BITARTRATE AND ACETAMINOPHEN 2 TABLET: 5; 325 TABLET ORAL at 16:18

## 2021-01-13 RX ADMIN — MEROPENEM 1 G: 1 INJECTION, POWDER, FOR SOLUTION INTRAVENOUS at 02:05

## 2021-01-13 RX ADMIN — HYDROCORTISONE: 10 CREAM TOPICAL at 21:19

## 2021-01-13 RX ADMIN — FERROUS SULFATE TAB EC 325 MG (65 MG FE EQUIVALENT) 325 MG: 325 (65 FE) TABLET DELAYED RESPONSE at 08:59

## 2021-01-13 RX ADMIN — PANTOPRAZOLE SODIUM 40 MG: 40 TABLET, DELAYED RELEASE ORAL at 16:13

## 2021-01-13 RX ADMIN — SERTRALINE 50 MG: 50 TABLET, FILM COATED ORAL at 08:58

## 2021-01-13 RX ADMIN — LEVETIRACETAM 1500 MG: 500 TABLET, FILM COATED ORAL at 21:18

## 2021-01-13 RX ADMIN — HYDROCODONE BITARTRATE AND ACETAMINOPHEN 2 TABLET: 5; 325 TABLET ORAL at 09:03

## 2021-01-13 RX ADMIN — DESMOPRESSIN ACETATE 40 MG: 0.2 TABLET ORAL at 08:59

## 2021-01-13 RX ADMIN — HYDROCODONE BITARTRATE AND ACETAMINOPHEN 2 TABLET: 5; 325 TABLET ORAL at 00:56

## 2021-01-13 RX ADMIN — MEROPENEM 1 G: 1 INJECTION, POWDER, FOR SOLUTION INTRAVENOUS at 10:18

## 2021-01-13 ASSESSMENT — ENCOUNTER SYMPTOMS
CONSTIPATION: 0
SORE THROAT: 0
SHORTNESS OF BREATH: 0
VOMITING: 0
ABDOMINAL DISTENTION: 0
NAUSEA: 0
STRIDOR: 0
TROUBLE SWALLOWING: 0
RHINORRHEA: 0
CHOKING: 0
BACK PAIN: 0
DIARRHEA: 0
COUGH: 0
COLOR CHANGE: 0
WHEEZING: 0
CHEST TIGHTNESS: 0
ABDOMINAL PAIN: 0
PHOTOPHOBIA: 0

## 2021-01-13 ASSESSMENT — PAIN SCALES - GENERAL
PAINLEVEL_OUTOF10: 0
PAINLEVEL_OUTOF10: 7
PAINLEVEL_OUTOF10: 0
PAINLEVEL_OUTOF10: 5

## 2021-01-13 ASSESSMENT — PAIN DESCRIPTION - LOCATION: LOCATION: BACK

## 2021-01-13 ASSESSMENT — PAIN DESCRIPTION - PAIN TYPE: TYPE: ACUTE PAIN

## 2021-01-13 ASSESSMENT — PAIN DESCRIPTION - FREQUENCY: FREQUENCY: CONTINUOUS

## 2021-01-13 ASSESSMENT — PAIN DESCRIPTION - PROGRESSION: CLINICAL_PROGRESSION: NOT CHANGED

## 2021-01-13 NOTE — PROGRESS NOTES
[facility] -   Infectious diseases evaluation. Progress Note     admission date 1/4/2021    reason for consultation:   Leukocytosis    Impression :   Current:  · Splenic abscess  - drained 12/2020  · Post spleen embolization for mass, 10/2020 STL - E coli bacteroides streptococcus  · STV cx E coli  · R pleural effusion reactive to the spleen infarct  · Ovarian metastatic cancer  · 1/5/21 - Seizures and status epilepticus  · intubated  · Bandemia  · Rash on ceftriaxone, [facility]  Recommendations   Continue Meropenem for spleen abscess to cover org of STL and the current  E Coli  Invanz 1 g daily x 30 days on discharge  Repeat US spleen once the pus has dried  She might need to keep longer the drain till full resolution of the splenic necrosis  Monitor drain output   She remains full code, oncology on board  Pend thoracentesis pathlogy 1/13    Infection Control Recommendations   · [place] Precautions  · Contact Isolation     Antimicrobial Stewardship Recommendations   · Simplification of therapy  · Targeted therapy    Coordination ofOutpatient Care:   · Estimated Length of IV antimicrobials:  · Patient will need Midline / picc Catheter Insertion:   · Patient will need SNF:  · Patient will need outpatient wound care:     History of Present Illness:     INITIAL HISTORY:    Jossie Servin is a 60y.o.-year-old female w ovarian CA who came w status epilepticus, found down w SZ, intubated. Hx ovarian CA w mets to abd and lungs,    Recent GI bleed STL 10/2020, post embolization of the spleen for splenic mass. 12/2020 admitted w left splenic abscess and drained and sent recently on invanz [aspirate showed E. coli sensitive to ceftriaxone, Bacteroides, strep viridans.   When the patient was given ceftriaxone he had a rash and allergy reaction, hence it was stopped and he was sent on Invanz]  - left pleural fluid aspirated was then neg- At admission to [facility],  found w tarry fluid from the NGT and clots in ETT. possibly from nasal attempted intubation - Resolved-  She is full code. Left spleen drainage purulent. E coli on culture  Leukocytosis noticed, we are called for opinion. 1/5 left perisplenic fluid aspirated and grew E coli    CURRENT EVALUATION :  1/13/2021   Patient Vitals for the past 8 hrs:   BP Temp Temp src Pulse Resp SpO2   01/13/21 1142 (!) 92/52 98.6 °F (37 °C) Oral 87 17 96 %   01/13/21 0815 121/75     95 %   01/13/21 0759 118/62    20 97 %   01/13/21 0739 (!) 115/55 98.6 °F (37 °C) Oral 85 16 98 %       No acute episodes overnight   afebrile   No headache, seizures, weakness, chest pain, or shortness of breath  Patient had left sided thoracentesis 1/13  500 mL of yellow fluid removed, PH 8, LDH low, likely reactive    WBC count trending downward  Drain with cloudy fluid: culture E coli multiS and diphteroids           Summary of relevant labs: 1/13/2021    Labs:  WBC 32 -14 -12 - 9.8 - 8.4 - 7.6  Hb 10.4 - 9.9 - 10.2  Plat 654 - 568    Micro:  BC 1/5 x 2: No growth    Urine cx 1/5 : No growth     Perisplenic fluid  1/5/21  · cx 1/5 E coli  · LDH 70773  · RBC 69098  · WBC 150722    1/4 sputum negative for malignancy    Imaging:  CT AP 1/5  Interval placement of a percutaneous pigtail catheter left splenic complex   mass. Mesenteric, retroperitoneal, and left inguinal pathologic lymphadenopathy   consistent with metastatic disease. Multiple pulmonary nodules consistent with metastatic disease.  Prominent   right hilar lymph node. New left lower lobe consolidation and effusion. Diffuse bony blastic metastatic disease          MRI brain   Impression:  1. No acute intracranial abnormality. Specifically, no acute infarction, mesial temporal sclerosis, or intracranial metastatic disease. 2. Mild parenchymal volume loss. Sequela of mild chronic microvascular   ischemic changes.      CXR 1/5/21 Left basilar opacity and left perihilar infiltrate      I have personally reviewed the past medical history, past surgical history, medications, social history, and family history, and I haveupdated the database accordingly. Allergies:   Ceftriaxone     Review of Systems:     Review of Systems   Constitutional: Positive for activity change. Negative for appetite change, chills, diaphoresis, fatigue, fever and unexpected weight change. HENT: Negative for congestion, drooling, nosebleeds, rhinorrhea, sneezing, sore throat and trouble swallowing. Eyes: Negative for photophobia, redness and visual disturbance. Respiratory: Negative for cough, choking, chest tightness, shortness of breath, wheezing and stridor. Cardiovascular: Negative for chest pain and leg swelling. Gastrointestinal: Negative for abdominal distention, abdominal pain, constipation, diarrhea, nausea and vomiting. Endocrine: Negative for heat intolerance, polydipsia, polyphagia and polyuria. Genitourinary: Negative for decreased urine volume, difficulty urinating, dysuria, flank pain (Some left sided discomfort), frequency, hematuria, pelvic pain, urgency and vaginal pain. Musculoskeletal: Negative for arthralgias, back pain, joint swelling and myalgias. Skin: Negative for color change, rash and wound. Allergic/Immunologic: Negative for immunocompromised state. Neurological: Negative for dizziness, seizures, syncope, weakness, light-headedness, numbness and headaches. Hematological: Negative for adenopathy. Psychiatric/Behavioral: Negative for agitation, confusion, decreased concentration, self-injury and sleep disturbance. The patient is not nervous/anxious. Physical Examination :     Physical Exam  Constitutional:       General: She is not in acute distress. Appearance: Normal appearance. She is normal weight. She is not ill-appearing or toxic-appearing. HENT:      Head: Normocephalic and atraumatic. Nose: Nose normal. No congestion or rhinorrhea. Mouth/Throat:      Mouth: Mucous membranes are moist.      Pharynx: Oropharynx is clear. No oropharyngeal exudate or posterior oropharyngeal erythema. Eyes:      General: No scleral icterus. Conjunctiva/sclera: Conjunctivae normal.      Pupils: Pupils are equal, round, and reactive to light. Neck:      Musculoskeletal: Neck supple. No muscular tenderness. Cardiovascular:      Rate and Rhythm: Normal rate and regular rhythm. Pulses: Normal pulses. Heart sounds: Normal heart sounds. No murmur. No friction rub. No gallop. Pulmonary:      Effort: No respiratory distress. Breath sounds: Normal breath sounds. No stridor. No wheezing. Chest:      Chest wall: No tenderness. Abdominal:      General: There is no distension. Palpations: Abdomen is soft. There is no mass. Tenderness: There is no abdominal tenderness. There is no guarding or rebound. Comments: Left UQ  drain w pus   Genitourinary:     Comments: Urina cristiano  Musculoskeletal: Normal range of motion. General: No swelling. Right lower leg: No edema. Left lower leg: No edema. Skin:     Coloration: Skin is not jaundiced or pale. Findings: No bruising, erythema, lesion or rash. Neurological:      General: No focal deficit present. Mental Status: She is alert and oriented to person, place, and time. Cranial Nerves: No cranial nerve deficit. Sensory: No sensory deficit. Motor: No weakness. Coordination: Coordination normal.      Deep Tendon Reflexes: Reflexes normal.   Psychiatric:         Mood and Affect: Mood normal.         Behavior: Behavior normal.      Comments: Appropriate        Past Medical History:     Past Medical History:   Diagnosis Date    Anemia     Bleeding 10/2020    intra-abdominal bleeding -due to splenic mass with GI infiltration.   Status post embolization    [facility] [address] [address]  Depression     Diabetes mellitus ([facility] [address]     GERD (gastroesophageal reflux disease)     Metastatic cancer ([facility] [address] 10/2020    extensive intraabdominal and splenic involvement and lung mets.  Ovarian cancer ([facility] [address]     low grade serous ovarian carcinoma    Post chemo evaluation     2007: Chemo via med onc (Dr. Rebekah Hart), 2008: Corinda Prom due to rising CA-125, 2013: intraperitoneal chemo,12/2015: Ca125 - 25     Splenic lesion      Past Surgical  History:     Past Surgical History:   Procedure Laterality Date    ABSCESS DRAINAGE  2013    Franca rectal    ANUS SURGERY      ANAL FISSURECTOMY    COLECTOMY  03/2013    ex lap, tumor debulking, transverse colectomy w reanastamosis, subgastric omentectomy, intraperitoneal port placement    HYSTERECTOMY, TOTAL ABDOMINAL      IR EMBOLIZATION HEMORRHAGE  10/05/2020    intra-abdominal bleeding -due to splenic mass with GI infiltration. Status post embolization [employer] interlock coils x7. mri condtional 3t ok, safe immediately post implant.     IR PORT PLACEMENT EQUAL OR GREATER THAN 5 YEARS  8/24/2020    IR PORT PLACEMENT EQUAL OR GREATER THAN 5 YEARS 8/24/2020 Faith Olguin MD [facility] SPECIAL PROCEDURES    PORT SURGERY      IP Port    TONSILLECTOMY       Medications:      levETIRAcetam  1,500 mg Oral Q12H    sodium chloride flush  10 mL Intravenous 2 times per day    atorvastatin  40 mg Oral Daily    hydrocortisone   Topical BID    lisinopril  2.5 mg Oral Daily    metoprolol succinate  25 mg Oral Daily    aspirin  81 mg Oral Daily    pantoprazole  40 mg Oral BID AC    [Held by provider] enoxaparin  40 mg Subcutaneous Daily    potassium bicarb-citric acid  40 mEq Oral Daily    meropenem  1 g Intravenous Q8H    sodium chloride flush  10 mL Intravenous 2 times per day    ferrous sulfate  325 mg Oral Daily with breakfast    sertraline  50 mg Oral Daily     Social History:     Social History     Socioeconomic History    Marital status: Single Spouse name: Not on file    Number of children: Not on file    Years of education: Not on file    Highest education level: Not on file   Occupational History    Not on file   Social Needs    Financial resource strain: Not on file    Food insecurity     Worry: Not on file     Inability: Not on file    Transportation needs     Medical: Not on file     Non-medical: Not on file   Tobacco Use    Smoking status: Current Every Day Smoker     Packs/day: 1.00    Smokeless tobacco: Current User   Substance and Sexual Activity    Alcohol use: Not Currently    Drug use: Never    Sexual activity: Not on file   Lifestyle    Physical activity     Days per week: Not on file     Minutes per session: Not on file    Stress: Not on file   Relationships    Social connections     Talks on phone: Not on file     Gets together: Not on file     Attends Christianity service: Not on file     Active member of club or organization: Not on file     Attends meetings of clubs or organizations: Not on file     Relationship status: Not on file    Intimate partner violence     Fear of current or ex partner: Not on file     Emotionally abused: Not on file     Physically abused: Not on file     Forced sexual activity: Not on file   Other Topics Concern    Not on file   Social History Narrative    Not on file     Family History:     Family History   Problem Relation Age of Onset    Alcohol Abuse Mother    [employer] Cirrhosis Mother       Medical Decision Making:   I have independently reviewed/ordered the following labs:    CBC with Differential:   Recent Labs     01/12/21 0441 01/13/21  0600   WBC 8.9 7.6   HGB 9.9* 10.2*   HCT 34.7* 34.4*   * 568*   LYMPHOPCT 27 28   MONOPCT 9 11     BMP:  Recent Labs     01/12/21 0441 01/13/21  0600   * 136   K 4.4 4.5    101   CO2 24 24   BUN 15 11   CREATININE 0.35* 0.36*   MG 2.5 1.9     Hepatic Function Panel:   Recent Labs     01/12/21 0441 01/13/21  0600   PROT 6.2* 6.6 LABALBU 2.8* 3.2*   BILIDIR <0.08 <0.08   IBILI CANNOT BE CALCULATED CANNOT BE CALCULATED   BILITOT <0.10* <0.10*   ALKPHOS 73 71   ALT 7 9   AST 13 25     Lab Results   Component Value Date    CREATININE 0.36 01/13/2021    GLUCOSE 115 01/13/2021     Detailed results: Thank you for allowing us to participate in the care of this patient. Please call with questions. This note is created with the assistance of a speech recognition program.  While intending to generate adocument that actually reflects the content of the visit, the document can still have some errors including those of syntax and sound a like substitutions which may escape proof reading. It such instances, actual meaningcan be extrapolated by contextual diversion. Morro Chand MD  PGY-1, Internal Medicine Resident  [id number]  1/13/2021 1:53 PM       I have discussed the care of the patient, including pertinent history and exam findings,  with the resident. I have seen and examined the patient and the key elements of all parts of the encounter have been performed by me. I agree with the assessment, plan and orders as documented by the resident.     Anushka Ho, Infectious Diseases

## 2021-01-13 NOTE — PROGRESS NOTES
Physical Therapy  Facility/Department: [facility] NEURO  Daily Treatment Note  NAME: Huang Mendez  : 1965  MRN: [id number]    Date of Service: 2021    Discharge Recommendations:  Home with assist PRN        Assessment   Body structures, Functions, Activity limitations: Decreased functional mobility ; Decreased posture;Decreased endurance;Decreased strength;Decreased balance; Increased pain;Decreased coordination  Assessment: Steady gait without need for walker, 400'. Mild back pain. Will continue. PT Education: PT Role;Goals;Plan of Care;Transfer Training;Gait Training;General Safety; Functional Mobility Training  REQUIRES PT FOLLOW UP: Yes  Activity Tolerance  Activity Tolerance: Patient Tolerated treatment well     Patient Diagnosis(es): The primary encounter diagnosis was Seizure ([facility] [address] Diagnoses of Spleen, abscess and Splenic lesion were also pertinent to this visit. has a past medical history of Anemia, Bleeding, Cervical cancer ([facility] [address] Depression, Diabetes mellitus ([facility] [address] GERD (gastroesophageal reflux disease), Metastatic cancer ([facility] [address] Ovarian cancer ([facility] [address] Post chemo evaluation, and Splenic lesion. has a past surgical history that includes Hysterectomy, total abdominal; Port Surgery; Tonsillectomy; IR PORT PLACEMENT > 5 YEARS (2020); Anus surgery; Abscess Drainage (); colectomy (2013); and IR EMBOLIZATION HEMORRHAGE (10/05/2020). Restrictions  Restrictions/Precautions  Restrictions/Precautions: Seizure, Fall Risk  Required Braces or Orthoses?: No  Position Activity Restriction  Other position/activity restrictions: up with assist; SBP <140;  Subjective   General  Chart Reviewed: Yes  Family / Caregiver Present: No  Subjective  Subjective: RN and pt in agreement for PT; pt supine in bed upon PT arrival.  Pain Screening  Patient Currently in Pain: Yes  Pain Assessment  Pain Assessment: 0-10(Did not rate.   Pain in back)  Pain Location: Back  Vital Signs Patient Currently in Pain: Yes  Oxygen Therapy  SpO2: 97 %       Orientation x4     Cognition   Cognition  Overall Cognitive Status: WFL  Objective   Bed mobility  Supine to Sit: Supervision  Sit to Supine: Supervision  Transfers  Sit to Stand: Supervision  Stand to sit: Supervision  Ambulation  Ambulation?: Yes  Ambulation 1  Surface: level tile  Device: No Device  Assistance: Stand by assistance  Distance: 400'  Comments: Steady gait, mild back pain. Exercises  Comments: Exercises deferred due to back pain. Goals  Short term goals  Time Frame for Short term goals: 15  Short term goal 1: Pt to perform bed mobility and functional transfers with supervision. STG 1 met. Short term goal 2: Ambulate 150ft w/ RW SBA. STG 2 met. Short term goal 3: Ascend/descend 3 stairs with L rail CGA  Short term goal 4: Demonstrate standing balane of good - to decrease fall risk  Patient Goals   Patient goals :  To go home    Plan    Plan  Times per week: 5-6x/week  Current Treatment Recommendations: Strengthening, Transfer Training, Endurance Training, Patient/Caregiver Education & Training, Balance Training, Gait Training, Home Exercise Program, Functional Mobility Training, Stair training, Safety Education & Training  Safety Devices  Type of devices: Left in bed, Call light within reach, Gait belt, Nurse notified, Patient at risk for falls, All fall risk precautions in place  Restraints  Initially in place: Yes  Restraints: 4 bed rails and bed buddies for seizure precautions     Therapy Time   Individual Concurrent Group Co-treatment   Time In 1445         Time Out 1501         Minutes 16         Timed Code Treatment Minutes: [address], PT

## 2021-01-13 NOTE — BRIEF OP NOTE
Brief Postoperative Note for Thoracentesis    [facility]  YOB: 1965  [id number]    Pre-operative Diagnosis: left Pleural effusion      Post-operative Diagnosis: Same    Procedure: Ultrasound guided Thoracentesis     Anesthesia: 1% Lidocaine     Surgeons/Assistants: Kendy High PA-C    Complications: none    EBL: Minimal    Specimens: were obtained    Ultrasound guided left thoracentesis performed. 500 ml clear yellow fluid obtained. Dressing applied.       Electronically signed by JOANN Stephens on 1/13/2021 at 8:15 AM

## 2021-01-13 NOTE — PROGRESS NOTES
Patient here for ultrasound thoracentesis. Viola DAVID in room and consent signed. Ultrasound of  back done. Left back prepped, draped and numbed with lidocaine. Needle in without difficulty and 500 ml of yellow fluid drained. Needle out and dressing on. Post scan has been completed. Specimen to be sent to lab. Patient for post CXR. Patient tolerated well.  Discharged to room

## 2021-01-13 NOTE — CARE COORDINATION
Patient to go home on IV abx. Information sent to [facility].  Called Natali. She informs me that she will not be able to get benefits back today and that they can set up for tomorrow morning once benefits have been received.

## 2021-01-13 NOTE — PROGRESS NOTES
Progress Note               Date:                           1/13/2021  Patient name:           Xiomara Mendoza [facility]  Date of admission:  1/4/2021  4:09 PM  MRN:   [id number]  YOB: 1965  PCP:                           No primary care provider on file. Subjective:   Patient was seen and examined. Clinically stable. No events overnight. No headaches. No seizures. No weakness or numbness of the extremities. Patient went for cardiac catheterization this a.m. Single-vessel CAD. She got her thoracentesis this a.m.  500 ml drained out. Resting comfortably in the bed. Review of systems :  Constitutional: No fever or chills. No night sweats, no weight loss   Eyes: No eye discharge, double vision, or eye pain   HEENT: negative for sore mouth, sore throat, hoarseness and voice change   Respiratory: negative for cough , sputum, dyspnea, wheezing, hemoptysis, chest pain   Cardiovascular: negative for chest pain, dyspnea, palpitations, orthopnea, PND   Gastrointestinal: negative for nausea, vomiting, diarrhea, constipation, abdominal pain, Dysphagia, hematemesis and hematochezia   Genitourinary: negative for frequency, dysuria, nocturia, urinary incontinence, and hematuria   Integument: negative for rash, skin lesions, bruises. Hematologic/Lymphatic: negative for easy bruising, bleeding, lymphadenopathy, or petechiae   Endocrine: negative for heat or cold intolerance,weight changes, change in bowel habits and hair loss   Musculoskeletal: Complains of back pain however has a rectal tube inserted. Neurological:  headache improved.  No motor or sensory deficit     HPI in Brief:   The patient is a 60 y.o.  female who is admitted to the hospital for uncontrolled seizures. She is intubated. Seizures subsided.   Pt is known to us with metastatic ovarian cancer on Doxil and avastin (recently held due to Gi bleeding, chemo was started 9/2020 She has been in a nd out of the hospital for recurrent seizures. brain imaging showed no mets  ( done at [facility]?)   Pt is intubated in ER. No bleeding now LTME in place, no current seizures      Past Medical History:   has a past medical history of Anemia, Bleeding, Cervical cancer ([facility] [address] Depression, Diabetes mellitus ([facility] [address] GERD (gastroesophageal reflux disease), Metastatic cancer ([facility] [address] Ovarian cancer ([facility] [address] Post chemo evaluation, and Splenic lesion.     Past Surgical History:   has a past surgical history that includes Hysterectomy, total abdominal; Port Surgery; Tonsillectomy; IR PORT PLACEMENT > 5 YEARS (8/24/2020); Anus surgery; Abscess Drainage (2013); colectomy (03/2013); and IR EMBOLIZATION HEMORRHAGE (10/05/2020). Medications:   Reviewed in Epic     Objective:   Vitals: /75   Pulse 85   Temp 98.6 °F (37 °C) (Oral)   Resp 20   Ht 5' 6\" (1.676 m)   Wt 166 lb 7.2 oz (75.5 kg)   SpO2 95%   BMI 26.87 kg/m²       General appearance -alert oriented. Able to converse appropriately. Off supplemental oxygen. Eyes - pupils equal and reactive,  Ears - bilateral TM's and external ear canals normal  Mouth - mucous membranes moist  Neck - supple, no significant adenopathy  Chest - scattered rhonchi   Heart - normal rate, regular rhythm, normal S1, S2,   Abdomen - soft, nontender, nondistended, no masses or organomegaly  Neurological - sedated and intubated. Extremities - peripheral pulses normal, no pedal edema, no clubbing or cyanosis  Skin - pale, no bleeding. Data:  No intake/output data recorded. In: 1958 [P.O.:800;  I.V.:575]  Out: 70 [Drains:70]  CBC:   Recent Labs     01/11/21 0938 01/12/21 0441 01/13/21  0600   WBC 9.8 8.9 7.6   HGB 10.4* 9.9* 10.2*   * 654* 568*     BMP:    Recent Labs     01/11/21 0938 01/12/21 0441 01/13/21  0600    132* 136   K 4.5 4.4 4.5    102 101   CO2 25 24 24   BUN 11 15 11   CREATININE 0.29* 0.35* 0.36*   GLUCOSE 132* 120* 115* Hepatic:   Recent Labs     01/11/21  0938 01/12/21  0441 01/13/21  0600   AST 12 13 25   ALT 6 7 9   BILITOT <0.10* <0.10* <0.10*   ALKPHOS 75 73 71     INR: No results for input(s): INR in the last 72 hours. IMAGING DATA:    Problem Lists:   Primary Problem:  Status epilepticus ([address]   Current Problems:  Active Hospital Problems    Diagnosis Date Noted    Pleural effusion [J90] 01/13/2021    History of malignant neoplasm of ovary [Z85.43]     Status epilepticus ([address] [G40.901] 01/04/2021    Splenic abscess [D73.3]     Seizure ([address] [R56.9] 10/21/2020     PMH:  Past Medical History:   Diagnosis Date    Anemia     Bleeding 10/2020    intra-abdominal bleeding -due to splenic mass with GI infiltration. Status post embolization    Cervical cancer ([address]     Depression     Diabetes mellitus ([address]     GERD (gastroesophageal reflux disease)     Metastatic cancer ([address] 10/2020    extensive intraabdominal and splenic involvement and lung mets.  Ovarian cancer ([address]     low grade serous ovarian carcinoma    Post chemo evaluation     2007: Chemo via med onc (Dr. Gina Renee), 2008: [place] [facility] due to rising CA-125, 2013: intraperitoneal chemo,12/2015: Ca125 - 25     Splenic lesion       Allergies: Allergies   Allergen Reactions    Ceftriaxone      Had a rash on ceftriaxone December 2020, [facility]      Assessment    1. Metastatic ovarian cancer. 2. Stage IV disease with diffuse metastasis per CT. 3. Recurrent seizures. 4. Acute respiratory failure secondary to status epilepticus likely from metastasis from underlying malignancy. 5. Exudative pleural effusion. Plan     1. Discussed with the patient and her family. 2. No change in plan at this point of time. 3. Clinically she is significantly better and seizures are controlled. Discussed further treatment for her ovarian cancer. Supportive care versus chemotherapy treatment. 4. Patient and family are interested in more active treatment. Will respect patient's wishes. This will be addressed further as outpatient based on her performance. 5. Status post cardiac catheterization for STEMI with nonobstructive CAD. No objection to use of DAPT. Discussed options of chemotherapy with her. We will follow her up outpatient and proceed with the same per patient's wishes. She will likely qualify for use of anthracycline agents as needed. 6. We will follow. Beth Lezama M.D. Internal Medicine Resident , PGY-3  [address] [place] [address]  01/13/21 11:32 AM      This note is created with the assistance of a speech recognition program.  While intending to generate a document that actually reflects the content of the visit, the document can still have some errors including those of syntax and sound a like substitutions which may escape proof reading. It such instances, actual meaning can be extrapolated by contextual diversion. Attending Physician Statement   I have discussed the care of [address] [place] [address], including pertinent history and exam findings with the resident. I have reviewed the key elements of all parts of the encounter with the resident. I have seen and examined the patient with the resident. I agree with the assessment and plan and status of the problem list as documented.                                [facility]

## 2021-01-13 NOTE — PROGRESS NOTES
[facility]  Internal Medicine Teaching Residency Program  Inpatient Daily Progress Note  ______________________________________________________________________________    Patient: [facility]  YOB: 1965   STEFANIE:[id number]    Acct: [de-identified]     Room: [address] [place] [address]  Admit date: 1/4/2021  Today's date: 01/13/21  Number of days in the hospital: 9    SUBJECTIVE   Admitting Diagnosis: Status epilepticus  CC: Seizures    Pt examined at bedside. Chart & results reviewed. Vitals stable, afebrile. Had no acute issues overnight  Patient is currently on meropenem for splenic abscess as per recommendations of the ID team, likely discharge on Invanz 1 g daily x 30 days  S/p cath yesterday with non obstructive cath with single vessel CAD 70 % Stenosis. S/p  thoracentesis 1/13/20 with 500 ml  pleural fluid  Clear yellow drained and sent for cultures.     ROS:  Constitutional:  negative for chills, fevers, sweats  Respiratory:  negative for cough, dyspnea on exertion, hemoptysis, shortness of breath, wheezing  Cardiovascular:  negative for chest pain, chest pressure/discomfort, lower extremity edema, palpitations  Gastrointestinal:  negative for abdominal pain, constipation, diarrhea, nausea, vomiting  Neurological:  negative for dizziness, headache  BRIEF HISTORY 66-year-old female with history of diabetes, GERD, metastatic ovarian cancer that is post bilateral salpingo-oophorectomy, hysterectomy who presented as a transfer from [facility] ER for status epilepticus. Kathy Mcconnell was found altered by family and upon EMS arrival she was noted to have a witnessed seizure and was given 4 mg of Versed without cessation of seizures. Brandy Lazaro was an attempted nasal intubation which was unsuccessful and patient was transported to [facility] ED.  Patient was intubated and started on propofol.  Once propofol was started clinical seizure activity had resolved.  Patient was noted to have marked leukocytosis and broad-spectrum antibiotics with Vanco and Zosyn were started.  Patient was also given 5 L of LR for fluid resuscitation.     With regards to seizures, her initial diagnosis was in October 2020 where she was noted to have an MRI concerning for PRES. She was discharged home on Keppra 1500 mg twice daily.     Patient also had a recent visit at [address] [place] [address] she  had splenic embolization and subsequent splenic abscess requiring drain placement.  Was noted to be on Invanz per records.     With regards to her ovarian cancer, she follows up with Dr. Leeann Tinoco:   Patient arrived to [facility]'s ED and was noted to be agitated and biting her ET tube.  She was continued on propofol which was increased to 60 mics due to agitation. Patient was admitted to the neuro ICU.  She was continued on broad-spectrum antibiotics.  ID consult was obtained.  Antibiotics were switched to meropenem.  Preliminary culture with gram-negative rods.  General surgery was consulted with no acute surgical intervention at this time. Tolerated extubation and did well. Heme/onc on board for her metastatic ovarian ca. Transferred to floors for further management.     OBJECTIVE     Vital Signs: BP (!) 115/55   Pulse 70   Temp 98.6 °F (37 °C) (Oral)   Resp 16   Ht 5' 6\" (1.676 m)   Wt 166 lb 7.2 oz (75.5 kg)   SpO2 98%   BMI 26.87 kg/m²     Temp (24hrs), Av.8 °F (36.6 °C), Min:96.8 °F (36 °C), Max:98.6 °F (37 °C)    In: 1475   Out: 70 [Drains:70]    Physical Exam:    General appearance - alert, in no distress  Mental status - alert, oriented to person, place, and time  Eyes - pupils equal and reactive, extraocular eye movements intact  Mouth - mucous membranes moist, pharynx normal without lesions  Neck - supple, no significant adenopathy  Chest -   clear to auscultation, has minimal Rales bilaterally  Heart - normal rate, regular rhythm, normal S1, S2  Abdomen - soft, nontender, nondistended  Neurological - alert, oriented, normal speech, no focal deficits   Extremities - peripheral pulses normal, no pedal edema  Skin - normal coloration and turgor, no rashes     Medications:  Scheduled Medications:    levETIRAcetam  1,500 mg Oral Q12H    sodium chloride flush  10 mL Intravenous 2 times per day    atorvastatin  40 mg Oral Daily    hydrocortisone   Topical BID    lisinopril  2.5 mg Oral Daily    metoprolol succinate  25 mg Oral Daily    aspirin  81 mg Oral Daily    pantoprazole  40 mg Oral BID AC    [Held by provider] enoxaparin  40 mg Subcutaneous Daily    potassium bicarb-citric acid  40 mEq Oral Daily    meropenem  1 g Intravenous Q8H    sodium chloride flush  10 mL Intravenous 2 times per day    ferrous sulfate  325 mg Oral Daily with breakfast    sertraline  50 mg Oral Daily     Continuous Infusions:     PRN Medications    sodium chloride flush, 10 mL, PRN      acetaminophen, 650 mg, Q4H PRN      HYDROcodone-acetaminophen, 2 tablet, Q6H PRN      melatonin, 3 mg, Nightly PRN      magnesium sulfate, 1 g, PRN      sodium chloride flush, 10 mL, PRN      sodium chloride flush, 10 mL, PRN      promethazine, 12.5 mg, Q6H PRN    Or      ondansetron, 4 mg, Q6H PRN   polyethylene glycol, 17 g, Daily PRN      acetaminophen, 650 mg, Q6H PRN    Or      acetaminophen, 650 mg, Q6H PRN        Diagnostic Labs:  CBC:   Recent Labs     01/11/21  0938 01/12/21 0441 01/13/21  0600   WBC 9.8 8.9 7.6   RBC 4.42 4.29 4.41   HGB 10.4* 9.9* 10.2*   HCT 35.3* 34.7* 34.4*   MCV 79.9* 80.9* 78.0*   RDW 25.9* 25.9* 26.1*   * 654* 568*     BMP:   Recent Labs     01/11/21  0938 01/12/21 0441 01/13/21  0600    132* 136   K 4.5 4.4 4.5    102 101   CO2 25 24 24   BUN 11 15 11   CREATININE 0.29* 0.35* 0.36*     BNP: No results for input(s): BNP in the last 72 hours. PT/INR: No results for input(s): PROTIME, INR in the last 72 hours. APTT:   No results for input(s): APTT in the last 72 hours. CARDIAC ENZYMES: No results for input(s): CKMB, CKMBINDEX, TROPONINI in the last 72 hours.     Invalid input(s): CKTOTAL;3  FASTING LIPID PANEL:No results found for: CHOL, HDL, TRIG  LIVER PROFILE:   Recent Labs     01/11/21 0938 01/12/21 0441 01/13/21  0600   AST 12 13 25   ALT 6 7 9   BILIDIR <0.08 <0.08 <0.08   BILITOT <0.10* <0.10* <0.10*   ALKPHOS 75 73 71      MICROBIOLOGY:   Lab Results   Component Value Date/Time    CULTURE NO GROWTH 6 DAYS 01/05/2021 08:45 AM    CULTURE NO GROWTH 6 DAYS 01/05/2021 08:45 AM       Imaging:    Ct Head Wo Contrast    Result Date: 1/4/2021 Small to moderate left pleural effusion with adjacent airspace disease. Left-sided chest tube terminates overlying the left lung base. Endotracheal and enteric tubes as above. Xr Abdomen For Ng/og/ne Tube Placement    Result Date: 1/4/2021  Enteric tube in the stomach. Other incidental findings as above. Ct Chest Abdomen Pelvis W Contrast    Result Date: 1/5/2021  Interval placement of a percutaneous pigtail catheter left splenic complex mass. Mesenteric, retroperitoneal, and left inguinal pathologic lymphadenopathy consistent with metastatic disease. Multiple pulmonary nodules consistent with metastatic disease. Prominent right hilar lymph node. New left lower lobe consolidation and effusion. Diffuse bony blastic metastatic disease. Mri Brain W Wo Contrast    Result Date: 1/5/2021  1. No acute intracranial abnormality. Specifically, no acute infarction, mesial temporal sclerosis, or intracranial metastatic disease. 2. Mild parenchymal volume loss. Sequela of mild chronic microvascular ischemic changes. ASSESSMENT & PLAN     ASSESSMENT / PLAN:       Principal Problem:    Status epilepticus ([address]  Active Problems:    Seizure ([address]    Splenic abscess    History of malignant neoplasm of ovary    Pleural effusion  Resolved Problems:    * No resolved hospital problems. *        Status epilepticus   - continue on keppra 1.5 mg twice daily and op follow up with neurology in 4-6 weeks.     Acute respiratory failure requiring intubation secondary to status epilepticus:  -currently extubated and saturating well on room air     Status epilepticus with breakthrough seizures:  -Patient is currently on Keppra 1500 mg twice daily.  -Further recommendations as per Neurology.     Metastatic ovarian cancer, stage IV:  -continue supportive care.    -Patient with guarded prognosis per oncology.    Patient and family interested in more active treatment.  -Heme/onco want to follow the patient as an outpatient    Exudative pleural effusion, likely related to ovarian cancer metastasis:  -cultures growing E. Coli and diphtheroids- seems possible that the culture is actually from splenic drain and mistakenly labelled as from pleura. -ID on board. -S/P IR guided thoracocentesis, pending cultures. Continue on meropenem for now.     Splenic abscess s/p drainage in December 2020:  -ID following patient, currently on meropenem.  -Anticipate DC on Invanz 1 g daily for 30 days.  -Surgery was consulted, no intervention at this time.   -Repeat ultrasound spleen at 2 weeks time to see if the abscess is cleared.     NSTEMI:  -Cardiology on board, reduced EF 40 % on echo, on aspirin, Lipitor, BB, ACE  -S/P cath , cardiology on board started on lisinorpil 2.5 mg and metoprolol  Succinate 25 mg. Chronic combined systolic and diastolic congestive heart failure:  -Echo done on 01/04/2021 showed EF of 41%  - Continue on blood pressure control,     Type 2 diabetes mellitus:  -continue to monitor blood sugars  - controlled for now, without insulin     Anemia secondary to malignancy and medications along with infection  -currently on oral ferrous sulphate at home, Op management of Anemia with PCP.  -Monitor H&H daily.     Sacral decubitus ulcers  -wound care consulted. .     Hypokalemia:  -monitor and replace.     Leukocytosis- resolved  - improving,continue to monitor.     Thrombocytosis  -Could be related to splenic abscess/infection  -The platelet counts are 609-->985 today  -Hem/Onco  On board  -continue to monitor.     Small to mild pericardial effusion:  -Monitor    DVT prophylaxis: lovenox  Gastric prophylaxis: Protonix    PT/OT: Ongoing  Discharge Vero Zuniga MD  Internal Medicine Resident, PGY-1  [address], [place]  1/13/2021, 7:49 AM

## 2021-01-13 NOTE — PLAN OF CARE
Problem: Safety:  Goal: Ability to remain free from injury will improve  Description: Ability to remain free from injury will improve  1/12/2021 0552 by Kathie Plaza RN  Outcome: Met This Shift  Note: No seizure like activity this shift. Remains free from injury. Safety precautions in place. Fall precatiouns in place.

## 2021-01-13 NOTE — PROGRESS NOTES
[facility]   Progress Note                   Date:   1/13/2021  Patient name: [facility]  Date of admission:  1/4/2021  4:09 PM  MRN:   [id number]  YOB: 1965  PCP: No primary care provider on file. Reason for Admission: Status epilepticus ([facility] [address] [G40.901]    Subjective:       Clinical Changes / Abnormalities: Patient seen and examined. Denies chest pain or SOB. No CV concerns overnight. SR on tele. Labs, vitals, & tele reviewed. Medications:   Scheduled Meds:   levETIRAcetam  1,500 mg Oral Q12H    sodium chloride flush  10 mL Intravenous 2 times per day    atorvastatin  40 mg Oral Daily    hydrocortisone   Topical BID    lisinopril  2.5 mg Oral Daily    metoprolol succinate  25 mg Oral Daily    aspirin  81 mg Oral Daily    pantoprazole  40 mg Oral BID AC    [Held by provider] enoxaparin  40 mg Subcutaneous Daily    potassium bicarb-citric acid  40 mEq Oral Daily    meropenem  1 g Intravenous Q8H    sodium chloride flush  10 mL Intravenous 2 times per day    ferrous sulfate  325 mg Oral Daily with breakfast    sertraline  50 mg Oral Daily     Continuous Infusions:    CBC:   Recent Labs     01/11/21  0938 01/12/21  0441 01/13/21  0600   WBC 9.8 8.9 7.6   HGB 10.4* 9.9* 10.2*   * 654* 568*     BMP:    Recent Labs     01/11/21  0938 01/12/21  0441 01/13/21  0600    132* 136   K 4.5 4.4 4.5    102 101   CO2 25 24 24   BUN 11 15 11   CREATININE 0.29* 0.35* 0.36*   GLUCOSE 132* 120* 115*     Hepatic:   Recent Labs     01/11/21  0938 01/12/21  0441 01/13/21  0600   AST 12 13 25   ALT 6 7 9   BILITOT <0.10* <0.10* <0.10*   ALKPHOS 75 73 71     Troponin:   No results for input(s): TROPHS in the last 72 hours. BNP: No results for input(s): BNP in the last 72 hours. Lipids: No results for input(s): CHOL, HDL in the last 72 hours. Invalid input(s): LDLCALCU  INR: No results for input(s): INR in the last 72 hours.     Objective: Vitals: /75   Pulse 85   Temp 98.6 °F (37 °C) (Oral)   Resp 20   Ht 5' 6\" (1.676 m)   Wt 166 lb 7.2 oz (75.5 kg)   SpO2 95%   BMI 26.87 kg/m²   General appearance: intubated and sedated   HEENT: Head: Normocephalic, no lesions, without obvious abnormality. Neck: no JVD, trachea midline, no adenopathy  Lungs: Clear to auscultation throughout, mild anterior rhonchi. No rales. NC oxygen    Heart: Regular rate and rhythm, s1/s2 auscultated, no murmurs. SR/ST  Abdomen: soft, non-tender, bowel sounds active  Extremities: no edema  Neurologic: not done    DIAGNOSTIC DATA  CATH 1/12/2021  Findings:  LMCA: Normal 0% stenosis.     LAD: Mild irregularities 10-20%. D1: Ostial 30-40% stenosis     LCx: Mild irregularities 10-20%.     RCA: Mild irregularities 10-20%. PDA: had mid stenosis 70% stenosis, small  vessel.      Coronary Tree      Dominance: Right     The LV gram was performed in the KNOWLES 30 position. LVEF: 50%.    Estimated blood loss: 5 ml     Conclusions:      Single vessel CAD (Small PDA)   Lower normal LV function     Recommendations:  1. Medical Therapy. 2. Risk Factors Modification. 3. Post Cath Protocol      EKG:    Date: 01/05/21  Reading: This tachycardia, inverted P waves in V1     ECHO:  Date: 8/20  Findings Summary: Normal LV size, normal systolic function. LVEF 60 to 65%. No regional wall motion abnormalities. Mild concentric LVH. No valvular regurgitation or stenosis. Echo 1/6/21  Summary  Left ventricle is normal in size. Global left ventricular systolic function is mild to moderately reduced. Estimated ejection fraction is 40 % . The apex and apical segments appear severely hypokinetic. The basal segments  appear to be functioning normal.  Abnormal global L. strain of -11 %. Mild left ventricular hypertrophy. Grade I (mild) left ventricular diastolic dysfunction. Mild aortic insufficiency. Trivial mitral regurgitation. Trivial tricuspid regurgitation. Small to mild circumferential pericardial effusion, with no  echocardiographic tamponade. Assessment / Acute Cardiac Problems:   1. NSTEMI    2. New onset seizure  3. Stage 4 ovarian CA   4. Splenic abscess on IV ATB  5. Hypokalemia  6. Hypomagnesium  7. CMP with LVEF on recent echo down to 40% with significant WMA    Patient Active Problem List:     Malignant neoplasm of ovary (HCC)     Seizure ([address]     Type 2 diabetes mellitus without complication, without long-term current use of insulin (HCC)     Anemia     Splenic lesion     Ovarian cancer ([address]     Acute encephalopathy     PRES (posterior reversible encephalopathy syndrome)     Hemianopia, bitemporal     Encephalopathy     Status epilepticus ([address]     History of malignant neoplasm of ovary      Plan of Treatment:   1. NSTEMI. S/P CATH 1/12/2021 Medical treatment as above. Continue ASA, statin, BB, ACE. Discussed in detail with patient post cath POC including but not limited to medications, diet, exercise, right radial artery site care, and follow-up. Questions and concerns addressed. F/U in office in 2 weeks. 2. Keep K > 4 and Mag > 2 . Stable. 3. Pleural Effusion s/p thoracentesis this am with 500 ml obtained per IR. 4. Rest of care per primary service.       Electronically signed by ESTRELLITA Culp NP on 1/13/2021 at 10:52 AM  [address]  [phone]

## 2021-01-13 NOTE — PLAN OF CARE
Problem: Safety:  Goal: Ability to remain free from injury will improve  Description: Ability to remain free from injury will improve  Outcome: Met This Shift  Note: No seizure like activity this shift. Remains free from injury. Safety precautions in place. Fall precatiouns in place. Problem: Skin Integrity:  Goal: Will show no infection signs and symptoms  Description: Will show no infection signs and symptoms  Outcome: Met This Shift  Goal: Absence of new skin breakdown  Description: Absence of new skin breakdown  Outcome: Met This Shift     Problem: Falls - Risk of:  Goal: Will remain free from falls  Description: Will remain free from falls  1/13/2021 0349 by Lydia Mejía RN  Outcome: Met This Shift  Note: Patient remained free from injury. Patient verbalized understanding of need for the safety precautions. Demonstrates proper use of assistive devices. Bed remains in the lowest position. Call light remains within reach. [place] [facility] in use.     1/12/2021 1900 by Boogie Lucio RN  Outcome: Met This Shift  Goal: Absence of physical injury  Description: Absence of physical injury  1/13/2021 0349 by Lydia Mejía RN  Outcome: Met This Shift  1/12/2021 1900 by Boogie Lucio RN  Outcome: Met This Shift     Problem: Pain:  Goal: Pain level will decrease  Description: Pain level will decrease  Outcome: Ongoing  Note: Pain level assessment complete. Pt rated pain at 7/10. Pt educated on pain scale and control interventions. PRN pain medication given per pt request. Pt instructed to call out with new onset of pain or unrelieved pain. Will continue to monitor.      Goal: Control of acute pain  Description: Control of acute pain  Outcome: Ongoing  Goal: Control of chronic pain  Description: Control of chronic pain  Outcome: Ongoing

## 2021-01-14 ENCOUNTER — TELEPHONE (OUTPATIENT)
Dept: PULMONOLOGY | Age: 56
End: 2021-01-14

## 2021-01-14 VITALS
RESPIRATION RATE: 20 BRPM | TEMPERATURE: 98 F | BODY MASS INDEX: 26.75 KG/M2 | OXYGEN SATURATION: 96 % | SYSTOLIC BLOOD PRESSURE: 96 MMHG | HEART RATE: 75 BPM | WEIGHT: 166.45 LBS | DIASTOLIC BLOOD PRESSURE: 53 MMHG | HEIGHT: 66 IN

## 2021-01-14 LAB
ABSOLUTE EOS #: 0.77 K/UL (ref 0–0.44)
ABSOLUTE IMMATURE GRANULOCYTE: 0.1 K/UL (ref 0–0.3)
ABSOLUTE LYMPH #: 1.82 K/UL (ref 1.1–3.7)
ABSOLUTE MONO #: 0.86 K/UL (ref 0.1–1.2)
ALBUMIN SERPL-MCNC: 3.2 G/DL (ref 3.5–5.2)
ALBUMIN/GLOBULIN RATIO: 1 (ref 1–2.5)
ALP BLD-CCNC: 72 U/L (ref 35–104)
ALT SERPL-CCNC: 10 U/L (ref 5–33)
ANION GAP SERPL CALCULATED.3IONS-SCNC: 10 MMOL/L (ref 9–17)
APPEARANCE FLUID: NORMAL
AST SERPL-CCNC: 15 U/L
BASO FLUID: NORMAL %
BASOPHILS # BLD: 1 % (ref 0–2)
BASOPHILS ABSOLUTE: 0.1 K/UL (ref 0–0.2)
BILIRUB SERPL-MCNC: <0.1 MG/DL (ref 0.3–1.2)
BILIRUBIN DIRECT: <0.08 MG/DL
BILIRUBIN, INDIRECT: ABNORMAL MG/DL (ref 0–1)
BUN BLDV-MCNC: 15 MG/DL (ref 6–20)
BUN/CREAT BLD: ABNORMAL (ref 9–20)
CALCIUM IONIZED: 1.17 MMOL/L (ref 1.13–1.33)
CALCIUM SERPL-MCNC: 8.9 MG/DL (ref 8.6–10.4)
CHLORIDE BLD-SCNC: 102 MMOL/L (ref 98–107)
CO2: 25 MMOL/L (ref 20–31)
COLOR FLUID: NORMAL
CREAT SERPL-MCNC: 0.34 MG/DL (ref 0.5–0.9)
DIFFERENTIAL TYPE: ABNORMAL
EOSINOPHIL FLUID: NORMAL %
EOSINOPHILS RELATIVE PERCENT: 8 % (ref 1–4)
FLUID DIFF COMMENT: NORMAL
GFR AFRICAN AMERICAN: >60 ML/MIN
GFR NON-AFRICAN AMERICAN: >60 ML/MIN
GFR SERPL CREATININE-BSD FRML MDRD: ABNORMAL ML/MIN/{1.73_M2}
GFR SERPL CREATININE-BSD FRML MDRD: ABNORMAL ML/MIN/{1.73_M2}
GLOBULIN: ABNORMAL G/DL (ref 1.5–3.8)
GLUCOSE BLD-MCNC: 103 MG/DL (ref 65–105)
GLUCOSE BLD-MCNC: 113 MG/DL (ref 65–105)
GLUCOSE BLD-MCNC: 133 MG/DL (ref 70–99)
GLUCOSE BLD-MCNC: 144 MG/DL (ref 65–105)
HCT VFR BLD CALC: 33.5 % (ref 36.3–47.1)
HEMOGLOBIN: 9.8 G/DL (ref 11.9–15.1)
IMMATURE GRANULOCYTES: 1 %
LYMPHOCYTES # BLD: 19 % (ref 24–43)
LYMPHOCYTES, BODY FLUID: 38 %
MAGNESIUM: 1.8 MG/DL (ref 1.6–2.6)
MCH RBC QN AUTO: 23.2 PG (ref 25.2–33.5)
MCHC RBC AUTO-ENTMCNC: 29.3 G/DL (ref 28.4–34.8)
MCV RBC AUTO: 79.2 FL (ref 82.6–102.9)
MONOCYTE, FLUID: NORMAL %
MONOCYTES # BLD: 9 % (ref 3–12)
MORPHOLOGY: ABNORMAL
MORPHOLOGY: ABNORMAL
NEUTROPHIL, FLUID: 1 %
NRBC AUTOMATED: 0 PER 100 WBC
OTHER CELLS FLUID: NORMAL %
PDW BLD-RTO: 25.6 % (ref 11.8–14.4)
PLATELET # BLD: 662 K/UL (ref 138–453)
PLATELET ESTIMATE: ABNORMAL
PMV BLD AUTO: 8.9 FL (ref 8.1–13.5)
POTASSIUM SERPL-SCNC: 4.4 MMOL/L (ref 3.7–5.3)
RBC # BLD: 4.23 M/UL (ref 3.95–5.11)
RBC # BLD: ABNORMAL 10*6/UL
RBC FLUID: 4000 /MM3
SEG NEUTROPHILS: 62 % (ref 36–65)
SEGMENTED NEUTROPHILS ABSOLUTE COUNT: 5.95 K/UL (ref 1.5–8.1)
SODIUM BLD-SCNC: 137 MMOL/L (ref 135–144)
SPECIMEN TYPE: NORMAL
SURGICAL PATHOLOGY REPORT: NORMAL
TOTAL PROTEIN: 6.4 G/DL (ref 6.4–8.3)
WBC # BLD: 9.6 K/UL (ref 3.5–11.3)
WBC # BLD: ABNORMAL 10*3/UL
WBC FLUID: 641 /MM3

## 2021-01-14 PROCEDURE — 83735 ASSAY OF MAGNESIUM: CPT

## 2021-01-14 PROCEDURE — 6370000000 HC RX 637 (ALT 250 FOR IP): Performed by: INTERNAL MEDICINE

## 2021-01-14 PROCEDURE — 82947 ASSAY GLUCOSE BLOOD QUANT: CPT

## 2021-01-14 PROCEDURE — 2580000003 HC RX 258: Performed by: INTERNAL MEDICINE

## 2021-01-14 PROCEDURE — 6360000002 HC RX W HCPCS: Performed by: STUDENT IN AN ORGANIZED HEALTH CARE EDUCATION/TRAINING PROGRAM

## 2021-01-14 PROCEDURE — 80048 BASIC METABOLIC PNL TOTAL CA: CPT

## 2021-01-14 PROCEDURE — 80076 HEPATIC FUNCTION PANEL: CPT

## 2021-01-14 PROCEDURE — 99231 SBSQ HOSP IP/OBS SF/LOW 25: CPT | Performed by: INTERNAL MEDICINE

## 2021-01-14 PROCEDURE — 6360000002 HC RX W HCPCS: Performed by: INTERNAL MEDICINE

## 2021-01-14 PROCEDURE — 85025 COMPLETE CBC W/AUTO DIFF WBC: CPT

## 2021-01-14 PROCEDURE — 99232 SBSQ HOSP IP/OBS MODERATE 35: CPT | Performed by: INTERNAL MEDICINE

## 2021-01-14 PROCEDURE — 97110 THERAPEUTIC EXERCISES: CPT

## 2021-01-14 PROCEDURE — 36415 COLL VENOUS BLD VENIPUNCTURE: CPT

## 2021-01-14 PROCEDURE — 99233 SBSQ HOSP IP/OBS HIGH 50: CPT | Performed by: INTERNAL MEDICINE

## 2021-01-14 PROCEDURE — 82330 ASSAY OF CALCIUM: CPT

## 2021-01-14 PROCEDURE — 97116 GAIT TRAINING THERAPY: CPT

## 2021-01-14 RX ORDER — AMOXICILLIN AND CLAVULANATE POTASSIUM 875; 125 MG/1; MG/1
1 TABLET, FILM COATED ORAL EVERY 12 HOURS SCHEDULED
Qty: 60 TABLET | Refills: 0 | Status: SHIPPED | OUTPATIENT
Start: 2021-01-14 | End: 2021-02-13

## 2021-01-14 RX ORDER — AMOXICILLIN AND CLAVULANATE POTASSIUM 875; 125 MG/1; MG/1
1 TABLET, FILM COATED ORAL EVERY 12 HOURS SCHEDULED
Status: DISCONTINUED | OUTPATIENT
Start: 2021-01-14 | End: 2021-01-14 | Stop reason: HOSPADM

## 2021-01-14 RX ORDER — HEPARIN SODIUM (PORCINE) LOCK FLUSH IV SOLN 100 UNIT/ML 100 UNIT/ML
500 SOLUTION INTRAVENOUS PRN
Status: DISCONTINUED | OUTPATIENT
Start: 2021-01-14 | End: 2021-01-14 | Stop reason: HOSPADM

## 2021-01-14 RX ORDER — AMOXICILLIN AND CLAVULANATE POTASSIUM 875; 125 MG/1; MG/1
1 TABLET, FILM COATED ORAL EVERY 12 HOURS SCHEDULED
Status: DISCONTINUED | OUTPATIENT
Start: 2021-01-14 | End: 2021-01-14

## 2021-01-14 RX ADMIN — HYDROCODONE BITARTRATE AND ACETAMINOPHEN 2 TABLET: 5; 325 TABLET ORAL at 09:43

## 2021-01-14 RX ADMIN — PANTOPRAZOLE SODIUM 40 MG: 40 TABLET, DELAYED RELEASE ORAL at 15:02

## 2021-01-14 RX ADMIN — PANTOPRAZOLE SODIUM 40 MG: 40 TABLET, DELAYED RELEASE ORAL at 09:01

## 2021-01-14 RX ADMIN — MEROPENEM 1 G: 1 INJECTION, POWDER, FOR SOLUTION INTRAVENOUS at 02:00

## 2021-01-14 RX ADMIN — MEROPENEM 1 G: 1 INJECTION, POWDER, FOR SOLUTION INTRAVENOUS at 11:24

## 2021-01-14 RX ADMIN — SODIUM CHLORIDE, PRESERVATIVE FREE 10 ML: 5 INJECTION INTRAVENOUS at 09:07

## 2021-01-14 RX ADMIN — HEPARIN 500 UNITS: 100 SYRINGE at 17:50

## 2021-01-14 RX ADMIN — LEVETIRACETAM 1500 MG: 500 TABLET, FILM COATED ORAL at 09:01

## 2021-01-14 RX ADMIN — DESMOPRESSIN ACETATE 40 MG: 0.2 TABLET ORAL at 09:01

## 2021-01-14 RX ADMIN — FERROUS SULFATE TAB EC 325 MG (65 MG FE EQUIVALENT) 325 MG: 325 (65 FE) TABLET DELAYED RESPONSE at 09:01

## 2021-01-14 RX ADMIN — HYDROCORTISONE: 10 CREAM TOPICAL at 09:01

## 2021-01-14 RX ADMIN — SERTRALINE 50 MG: 50 TABLET, FILM COATED ORAL at 09:01

## 2021-01-14 RX ADMIN — AMOXICILLIN AND CLAVULANATE POTASSIUM 1 TABLET: 875; 125 TABLET, FILM COATED ORAL at 15:02

## 2021-01-14 RX ADMIN — ASPIRIN 81 MG: 81 TABLET, CHEWABLE ORAL at 09:01

## 2021-01-14 RX ADMIN — SODIUM CHLORIDE, PRESERVATIVE FREE 10 ML: 5 INJECTION INTRAVENOUS at 09:06

## 2021-01-14 RX ADMIN — POTASSIUM BICARBONATE 40 MEQ: 782 TABLET, EFFERVESCENT ORAL at 09:08

## 2021-01-14 ASSESSMENT — PAIN - FUNCTIONAL ASSESSMENT: PAIN_FUNCTIONAL_ASSESSMENT: ACTIVITIES ARE NOT PREVENTED

## 2021-01-14 ASSESSMENT — ENCOUNTER SYMPTOMS
TROUBLE SWALLOWING: 0
FACIAL SWELLING: 0
EYE REDNESS: 0
STRIDOR: 0
ABDOMINAL PAIN: 0
COUGH: 0
NAUSEA: 0
RHINORRHEA: 0
ABDOMINAL DISTENTION: 0
WHEEZING: 0
SHORTNESS OF BREATH: 0
SORE THROAT: 0
CONSTIPATION: 0
CHEST TIGHTNESS: 0
CHOKING: 0
BACK PAIN: 0
COLOR CHANGE: 0
VOMITING: 0
PHOTOPHOBIA: 0
DIARRHEA: 0

## 2021-01-14 ASSESSMENT — PAIN DESCRIPTION - ONSET: ONSET: ON-GOING

## 2021-01-14 ASSESSMENT — PAIN SCALES - GENERAL
PAINLEVEL_OUTOF10: 0
PAINLEVEL_OUTOF10: 4
PAINLEVEL_OUTOF10: 0
PAINLEVEL_OUTOF10: 7
PAINLEVEL_OUTOF10: 7

## 2021-01-14 ASSESSMENT — PAIN DESCRIPTION - LOCATION: LOCATION: BACK

## 2021-01-14 ASSESSMENT — PAIN DESCRIPTION - ORIENTATION
ORIENTATION: LOWER
ORIENTATION: LOWER

## 2021-01-14 ASSESSMENT — PAIN DESCRIPTION - FREQUENCY: FREQUENCY: CONTINUOUS

## 2021-01-14 NOTE — CARE COORDINATION
Received a call from Flakita at [facility].  She informs me that the patient has a $5000.00 deductable and a $6500.00 out of pocket expense, which she has already has used up $1668.00. Spoke to patient and she tells me that she will not be able to afford this medication. RN to notify Dr. Claus Fink    1700 Dr. Claus Fink changed IV to PO medications.   Patient discharged to home with [facility]

## 2021-01-14 NOTE — PROGRESS NOTES
Ride arrived to bedside. Patient finished dinner, she is dressed. Up to bathroom. Discharged to home. Wheelchair with Redmon Ball PCT to care.

## 2021-01-14 NOTE — PROGRESS NOTES
Physical Therapy  Facility/Department: [facility] NEURO  Daily Treatment Note  NAME: Macy Hall  : 1965  MRN: [id number]    Date of Service: 2021    Discharge Recommendations:  Further therapy recommended at discharge. Assessment   Body structures, Functions, Activity limitations: Decreased functional mobility ; Decreased posture;Decreased endurance;Decreased strength;Decreased balance; Increased pain;Decreased coordination  Assessment: Pt ambulated 400ft SBA with no AD and no LOB noted throughout. Would recommend continued physical therapy upon discharge. Prognosis: Good  PT Education: PT Role;Goals;Plan of Care;Transfer Training;Gait Training;General Safety; Functional Mobility Training  REQUIRES PT FOLLOW UP: Yes  Activity Tolerance  Activity Tolerance: Patient Tolerated treatment well     Patient Diagnosis(es): The primary encounter diagnosis was Seizure ([facility] [address] Diagnoses of Spleen, abscess and Splenic lesion were also pertinent to this visit. has a past medical history of Anemia, Bleeding, Cervical cancer ([address] Depression, Diabetes mellitus ([address] GERD (gastroesophageal reflux disease), Metastatic cancer ([address] Ovarian cancer ([address] Post chemo evaluation, and Splenic lesion. has a past surgical history that includes Hysterectomy, total abdominal; Port Surgery; Tonsillectomy; IR PORT PLACEMENT > 5 YEARS (2020); Anus surgery; Abscess Drainage (); colectomy (2013); and IR EMBOLIZATION HEMORRHAGE (10/05/2020). Restrictions  Restrictions/Precautions  Restrictions/Precautions: Seizure, Fall Risk  Required Braces or Orthoses?: No  Position Activity Restriction  Other position/activity restrictions: up with assist; SBP <140;  Subjective   General  Chart Reviewed: Yes  Response To Previous Treatment: Patient with no complaints from previous session.   Family / Caregiver Present: No  Subjective Subjective: RN and pt agreeable for PT this afternoon. Pt supine in bed upon arrival with c/o 7/10 pain in low back. Pain Screening  Patient Currently in Pain: Yes  Pain Assessment  Pain Assessment: 0-10  Pain Level: 7  Pain Type: Acute pain  Pain Location: Back  Pain Orientation: Lower  Vital Signs  Patient Currently in Pain: Yes       Orientation  Orientation  Overall Orientation Status: Within Functional Limits  Cognition   Cognition  Overall Cognitive Status: WFL  Objective   Bed mobility  Supine to Sit: Supervision  Sit to Supine: Supervision  Scooting: Supervision  Transfers  Sit to Stand: Supervision  Stand to sit: Supervision  Ambulation  Ambulation?: Yes  Ambulation 1  Surface: level tile  Device: No Device  Assistance: Stand by assistance  Gait Deviations: Slow Cynthia  Distance: 400ft  Comments: Steady gait, mild back pain. Stairs/Curb  Stairs?: No     Balance  Posture: Good  Sitting - Static: Good;-  Sitting - Dynamic: Good;-  Standing - Static: Fair;+  Standing - Dynamic: Fair;+  Comments: Steady in standing without device. Exercises:  Seated LE exercise program: Long Arc Quads, hip abduction/adduction, heel/toe raises, and marches. Reps: 10x each LE    Goals  Short term goals  Time Frame for Short term goals: 15  Short term goal 1: Pt to perform bed mobility and functional transfers with supervision. STG 1 met. Short term goal 2: Ambulate 150ft w/ RW SBA. STG 2 met. Short term goal 3: Ascend/descend 3 stairs with L rail CGA  Short term goal 4: Demonstrate standing balane of good - to decrease fall risk  Patient Goals   Patient goals :  To go home    Plan    Plan  Times per week: 5-6x/week  Current Treatment Recommendations: Strengthening, Transfer Training, Endurance Training, Patient/Caregiver Education & Training, Balance Training, Gait Training, Home Exercise Program, Functional Mobility Training, Stair training, Safety Education & Training  Safety Devices Type of devices: Left in bed, Call light within reach, Gait belt, Nurse notified, Patient at risk for falls, All fall risk precautions in place  Restraints  Initially in place: No  Restraints: 4 bed rails and bed buddies for seizure precautions     Therapy Time   Individual Concurrent Group Co-treatment   Time In 1435         Time Out 1458         Minutes 23         Timed Code Treatment Minutes: [address] [facility], [facility]

## 2021-01-14 NOTE — PROGRESS NOTES
Progress Note               Date:                           1/14/2021  Patient name:           Angelica Pfeiffer [facility]  Date of admission:  1/4/2021  4:09 PM  MRN:   [id number]  YOB: 1965  PCP:                           No primary care provider on file. Subjective:   Patient was seen and examined. Clinically stable. No events overnight. No headaches. No seizures. No weakness or numbness of the extremities. Patient went for cardiac catheterization this a.m. Single-vessel CAD. Resting comfortably in the bed. Review of systems :  Constitutional: No fever or chills. No night sweats, no weight loss   Eyes: No eye discharge, double vision, or eye pain   HEENT: negative for sore mouth, sore throat, hoarseness and voice change   Respiratory: negative for cough , sputum, dyspnea, wheezing, hemoptysis, chest pain   Cardiovascular: negative for chest pain, dyspnea, palpitations, orthopnea, PND   Gastrointestinal: negative for nausea, vomiting, diarrhea, constipation, abdominal pain, Dysphagia, hematemesis and hematochezia   Genitourinary: negative for frequency, dysuria, nocturia, urinary incontinence, and hematuria   Integument: negative for rash, skin lesions, bruises. Hematologic/Lymphatic: negative for easy bruising, bleeding, lymphadenopathy, or petechiae   Endocrine: negative for heat or cold intolerance,weight changes, change in bowel habits and hair loss   Musculoskeletal: Complains of back pain however has a rectal tube inserted. Neurological:  headache improved.  No motor or sensory deficit     HPI in Brief:   The patient is a 60 y.o.  female who is admitted to the hospital for uncontrolled seizures. She is intubated. Seizures subsided.   Pt is known to us with metastatic ovarian cancer on Doxil and avastin (recently held due to Gi bleeding, chemo was started 9/2020 She has been in a nd out of the hospital for recurrent seizures. brain imaging showed no mets  ( done at [facility]?)   Pt is intubated in ER. No bleeding now LTME in place, no current seizures      Past Medical History:   has a past medical history of Anemia, Bleeding, Cervical cancer ([facility] [address] Depression, Diabetes mellitus ([facility] [address] GERD (gastroesophageal reflux disease), Metastatic cancer ([facility] [address] Ovarian cancer ([facility] [address] Post chemo evaluation, and Splenic lesion.     Past Surgical History:   has a past surgical history that includes Hysterectomy, total abdominal; Port Surgery; Tonsillectomy; IR PORT PLACEMENT > 5 YEARS (8/24/2020); Anus surgery; Abscess Drainage (2013); colectomy (03/2013); and IR EMBOLIZATION HEMORRHAGE (10/05/2020). Medications:   Reviewed in Epic     Objective:   Vitals: BP (!) 96/53   Pulse 75   Temp 98 °F (36.7 °C) (Oral)   Resp 20   Ht 5' 6\" (1.676 m)   Wt 166 lb 7.2 oz (75.5 kg)   SpO2 96%   BMI 26.87 kg/m²       General appearance -alert oriented. Able to converse appropriately. Off supplemental oxygen. Eyes - pupils equal and reactive,  Ears - bilateral TM's and external ear canals normal  Mouth - mucous membranes moist  Neck - supple, no significant adenopathy  Chest - scattered rhonchi   Heart - normal rate, regular rhythm, normal S1, S2,   Abdomen - soft, nontender, nondistended, no masses or organomegaly  Neurological - sedated and intubated. Extremities - peripheral pulses normal, no pedal edema, no clubbing or cyanosis  Skin - pale, no bleeding. Data:  No intake/output data recorded.   In: 1060 [P.O.:960]  Out: 15 [Drains:15]  CBC:   Recent Labs     01/12/21 0441 01/13/21  0600 01/14/21  0508   WBC 8.9 7.6 9.6   HGB 9.9* 10.2* 9.8*   * 568* 662*     BMP:    Recent Labs     01/12/21 0441 01/13/21  0600 01/14/21  0508   * 136 137   K 4.4 4.5 4.4    101 102   CO2 24 24 25   BUN 15 11 15   CREATININE 0.35* 0.36* 0.34*   GLUCOSE 120* 115* 133*     Hepatic: Recent Labs     01/12/21  0441 01/13/21  0600 01/14/21  0508   AST 13 25 15   ALT 7 9 10   BILITOT <0.10* <0.10* <0.10*   ALKPHOS 73 71 72     INR: No results for input(s): INR in the last 72 hours. IMAGING DATA:    Problem Lists:   Primary Problem:  Status epilepticus [facility])   Current Problems:  Active Hospital Problems    Diagnosis Date Noted   Donte Danger [G32.581]    [facility] Pleural effusion [J90] 01/13/2021    History of malignant neoplasm of ovary [Z85.43]     Status epilepticus ([address] [G40.901] 01/04/2021    Splenic abscess [D73.3]     Seizure ([address] [R56.9] 10/21/2020     PMH:  Past Medical History:   Diagnosis Date    Anemia     Bleeding 10/2020    intra-abdominal bleeding -due to splenic mass with GI infiltration. Status post embolization    Cervical cancer ([address]     Depression     Diabetes mellitus ([address]     GERD (gastroesophageal reflux disease)     Metastatic cancer ([address] 10/2020    extensive intraabdominal and splenic involvement and lung mets.  Ovarian cancer ([address]     low grade serous ovarian carcinoma    Post chemo evaluation     2007: Chemo via med onc (Dr. Keven Espinosa), 2008: Jose Benitez due to rising CA-125, 2013: intraperitoneal chemo,12/2015: Ca125 - 25     Splenic lesion       Allergies: Allergies   Allergen Reactions    Ceftriaxone      Had a rash on ceftriaxone December 2020, [facility]      Assessment    1. Metastatic ovarian cancer. 2. Stage IV disease with diffuse metastasis per CT. 3. Recurrent seizures. 4. Acute respiratory failure secondary to status epilepticus likely from metastasis from underlying malignancy. 5. Exudative pleural effusion. Plan     1. Discussed with the patient and her family. 2. No change in plan at this point of time. 3. Electrolyte replacement as needed  4. Nutritional supplementation. 5. Clinically she is significantly better and seizures are controlled. Discussed further treatment for her ovarian cancer. Supportive care versus chemotherapy treatment. 6. Patient and family are interested in more active treatment. Will respect patient's wishes. This will be addressed further as outpatient based on her performance. 7. Status post cardiac catheterization for STEMI with nonobstructive CAD. No objection to use of DAPT. Discussed options of chemotherapy with her. We will follow her up outpatient and proceed with the same per patient's wishes. She will likely qualify for use of anthracycline agents as needed. 8. We will follow. Dora Avelar M.D. Internal Medicine Resident , PGY-3  [address] [place] [address]  01/14/21 5:17 PM  Attending Physician Statement  The patient was seen and examined during rounds, I have discussed the care of Myrtice Soulier, including pertinent history and exam findings with the resident. I have reviewed the key elements of all parts of the encounter with the resident. I agree with the assessment, and status of the problem list as documented. Additional assessment/ plan        May White MD  Hematology Oncology  [phone]  Electronically signed by Efren Mclaughlin MD on 1/14/2021 at 8:48 PM        This note is created with the assistance of a speech recognition program.  While intending to generate a document that actually reflects the content of the visit, the document can still have some errors including those of syntax and sound a like substitutions which may escape proof reading. It such instances, actual meaning can be extrapolated by contextual diversion.

## 2021-01-14 NOTE — TELEPHONE ENCOUNTER
----- Message from Rochelle Sousa sent at 1/14/2021  7:07 AM EST -----  Denice Mandel, please see message from Dr Alonso Dior and call to schedule. Thank you.   ----- Message -----  From: Brendan Sotelo MD  Sent: 1/13/2021   4:27 PM EST  To: [facility] Respiratory Spec Clinical Staff     Please have the patient follow-up with me in the office in 1 to 2 weeks. This can be done as a virtual visit.   Thank you

## 2021-01-14 NOTE — PROGRESS NOTES
[facility] -   Infectious diseases evaluation. Progress Note     admission date 1/4/2021    reason for consultation:   Leukocytosis    Impression :   Current:  · Splenic abscess  - drained 12/2020  · Post spleen embolization for mass, 10/2020 [facility] - E coli bacteroides streptococcus  · STV cx E coli  · R pleural effusion reactive to the spleen infarct  · Ovarian metastatic cancer  · 1/5/21 - Seizures and status epilepticus  · intubated  · Bandemia  · Rash on ceftriaxone, [facility]  Recommendations   · Continue Meropenem for spleen abscess to cover org of STL and the current  E Coli  · Invanz 1 g daily x 30 days on discharge  · Repeat US spleen once the pus has dried  · She might need to keep longer the drain till full resolution of the splenic necrosis  · Monitor drain output   · She remains full code, oncology on board  · Pend thoracentesis pathlogy 1/13 - Most likely Transudate and reactive  · Discharge planning per primary      Infection Control Recommendations   · [place] Precautions  · Contact Isolation     Antimicrobial Stewardship Recommendations   · Simplification of therapy  · Targeted therapy    Coordination ofOutpatient Care:   · Estimated Length of IV antimicrobials:  · Patient will need Midline / picc Catheter Insertion:   · Patient will need SNF:  · Patient will need outpatient wound care:     History of Present Illness:     INITIAL HISTORY:    Jossie Servin is a 60y.o.-year-old female w ovarian CA who came w status epilepticus, found down w SZ, intubated. Hx ovarian CA w mets to abd and lungs,    Recent GI bleed [facility] 10/2020, post embolization of the spleen for splenic mass. 12/2020 admitted w left splenic abscess and drained and sent recently on invanz [aspirate showed E. coli sensitive to ceftriaxone, Bacteroides, strep viridans. When the patient was given ceftriaxone he had a rash and allergy reaction, hence it was stopped and he was sent on Invanz]  - left pleural fluid aspirated was then neg-    At admission to [facility],  found w tarry fluid from the NGT and clots in ETT. possibly from nasal attempted intubation - Resolved-  She is full code. Left spleen drainage purulent. E coli on culture  Leukocytosis noticed, we are called for opinion. 1/5 left perisplenic fluid aspirated and grew E coli    CURRENT EVALUATION :  1/14/2021   Patient Vitals for the past 8 hrs:   BP Temp Temp src Pulse Resp SpO2   01/14/21 0907 (!) 98/56   97     01/14/21 0800    69     01/14/21 0742 113/64 97.5 °F (36.4 °C) Oral 72 18 97 %   01/14/21 0431 (!) 85/42 98.1 °F (36.7 °C) Oral  14 96 %     No acute episodes overnight  Patient is heme stable and afebrile   No headache, seizures, weakness, chest pain, or shortness of breath  Patient had left sided thoracentesis 1/13  500 mL of yellow fluid removed, PH 8, LDH low, likely reactive    WBC count trending downward  Drain with cloudy fluid: culture E coli multiS and diphteroids    Summary of relevant labs: 1/14/2021    Labs:  WBC 32 -14 -12 - 9.8 - 8.4 - 7.6 - 9.6  Hb 10.4 - 9.9 - 10.2 - 9.8  Plat 654 - 568 - 662  Creatine - 0.36 - 0.34    Micro:  BC 1/5 x 2: No growth    Urine cx 1/5 : No growth     Perisplenic fluid  1/5/21  · cx 1/5 E coli  · LDH 77255  · RBC 20773  · WBC 163329    1/4 sputum negative for malignancy    Thoracentesis from 1/13/2021 - 500 mL of yellow clear fluid removed  Most likely transudative  Body fluid culture negative 20 hours     Imaging:  CT AP 1/5  Interval placement of a percutaneous pigtail catheter left splenic complex   mass.    Mesenteric, retroperitoneal, and left inguinal pathologic lymphadenopathy consistent with metastatic disease. Multiple pulmonary nodules consistent with metastatic disease.  Prominent   right hilar lymph node. New left lower lobe consolidation and effusion. Diffuse bony blastic metastatic disease          MRI brain   Impression:  1. No acute intracranial abnormality. Specifically, no acute infarction, mesial temporal sclerosis, or intracranial metastatic disease. 2. Mild parenchymal volume loss. Sequela of mild chronic microvascular   ischemic changes. CXR 1/5/21  Left basilar opacity and left perihilar infiltrate      I have personally reviewed the past medical history, past surgical history, medications, social history, and family history, and I haveupdated the database accordingly. Allergies:   Ceftriaxone     Review of Systems:     Review of Systems   Constitutional: Positive for activity change. Negative for appetite change, chills, diaphoresis, fatigue, fever and unexpected weight change. HENT: Negative for congestion, drooling, facial swelling, hearing loss, nosebleeds, rhinorrhea, sneezing, sore throat and trouble swallowing. Eyes: Negative for photophobia and visual disturbance. Respiratory: Negative for cough, choking, chest tightness, shortness of breath, wheezing and stridor. Cardiovascular: Negative for chest pain, palpitations and leg swelling. Gastrointestinal: Negative for abdominal distention, abdominal pain, constipation, diarrhea, nausea and vomiting. Endocrine: Negative for heat intolerance, polydipsia, polyphagia and polyuria. Genitourinary: Negative for decreased urine volume, difficulty urinating, dyspareunia, dysuria, flank pain (Some left sided discomfort), frequency, hematuria, pelvic pain, urgency and vaginal pain. Musculoskeletal: Negative for arthralgias, back pain, joint swelling and myalgias. Skin: Negative for color change, pallor, rash and wound. Allergic/Immunologic: Negative for immunocompromised state. Neurological: Negative for dizziness, tremors, seizures, syncope, weakness, light-headedness, numbness and headaches. Hematological: Negative for adenopathy. Psychiatric/Behavioral: Negative for agitation, behavioral problems, confusion, decreased concentration, hallucinations, self-injury and sleep disturbance. The patient is not nervous/anxious. Physical Examination :     Physical Exam  Constitutional:       General: She is not in acute distress. Appearance: Normal appearance. She is normal weight. She is not ill-appearing, toxic-appearing or diaphoretic. HENT:      Head: Normocephalic and atraumatic. Nose: Nose normal. No congestion or rhinorrhea. Mouth/Throat:      Mouth: Mucous membranes are moist.      Pharynx: Oropharynx is clear. No oropharyngeal exudate or posterior oropharyngeal erythema. Eyes:      General: No scleral icterus. Conjunctiva/sclera: Conjunctivae normal.      Pupils: Pupils are equal, round, and reactive to light. Neck:      Musculoskeletal: Neck supple. No muscular tenderness. Vascular: No carotid bruit. Cardiovascular:      Rate and Rhythm: Normal rate and regular rhythm. Pulses: Normal pulses. Heart sounds: Normal heart sounds. No murmur. No friction rub. No gallop. Pulmonary:      Effort: No respiratory distress. Breath sounds: Normal breath sounds. No stridor. No wheezing or rales. Chest:      Chest wall: No tenderness. Abdominal:      General: There is no distension. Palpations: Abdomen is soft. There is no mass. Tenderness: There is no abdominal tenderness. There is no guarding or rebound. Hernia: No hernia is present. Comments: Left UQ  drain w pus   Genitourinary:     Comments: Urina cristiano  Musculoskeletal: Normal range of motion. General: No swelling or tenderness. Right lower leg: No edema. Left lower leg: No edema. Skin:     Coloration: Skin is not jaundiced or pale. Findings: No bruising, erythema, lesion or rash. Neurological:      General: No focal deficit present. Mental Status: She is alert and oriented to person, place, and time. Sensory: No sensory deficit. Coordination: Coordination normal.      Deep Tendon Reflexes: Reflexes normal.   Psychiatric:         Mood and Affect: Mood normal.         Behavior: Behavior normal.         Thought Content: Thought content normal.         Judgment: Judgment normal.      Comments: Appropriate        Past Medical History:     Past Medical History:   Diagnosis Date    Anemia     Bleeding 10/2020    intra-abdominal bleeding -due to splenic mass with GI infiltration. Status post embolization    [facility]     Depression     Diabetes mellitus ([facility] [facility]     GERD (gastroesophageal reflux disease)     Metastatic cancer ([facility] [address] 10/2020    extensive intraabdominal and splenic involvement and lung mets.  Ovarian cancer ([facility] [address]     low grade serous ovarian carcinoma    Post chemo evaluation     2007: Chemo via med onc (Dr. Reji Flynn), 2008: Jerral Fam due to rising CA-125, 2013: intraperitoneal chemo,12/2015: Ca125 - 25     Splenic lesion      Past Surgical  History:     Past Surgical History:   Procedure Laterality Date    ABSCESS DRAINAGE  2013    Franca rectal    ANUS SURGERY      ANAL FISSURECTOMY    COLECTOMY  03/2013    ex lap, tumor debulking, transverse colectomy w reanastamosis, subgastric omentectomy, intraperitoneal port placement    HYSTERECTOMY, TOTAL ABDOMINAL      IR EMBOLIZATION HEMORRHAGE  10/05/2020    intra-abdominal bleeding -due to splenic mass with GI infiltration. Status post embolization [employer] interlock coils x7. mri condtional 3t ok, safe immediately post implant.     IR PORT PLACEMENT EQUAL OR GREATER THAN 5 YEARS  8/24/2020    IR PORT PLACEMENT EQUAL OR GREATER THAN 5 YEARS 8/24/2020 MD SHAHRAM NavaZ SPECIAL PROCEDURES    PORT SURGERY      IP Port    TONSILLECTOMY Medications:      levETIRAcetam  1,500 mg Oral Q12H    sodium chloride flush  10 mL Intravenous 2 times per day    atorvastatin  40 mg Oral Daily    hydrocortisone   Topical BID    lisinopril  2.5 mg Oral Daily    metoprolol succinate  25 mg Oral Daily    aspirin  81 mg Oral Daily    pantoprazole  40 mg Oral BID AC    [Held by provider] enoxaparin  40 mg Subcutaneous Daily    potassium bicarb-citric acid  40 mEq Oral Daily    meropenem  1 g Intravenous Q8H    sodium chloride flush  10 mL Intravenous 2 times per day    ferrous sulfate  325 mg Oral Daily with breakfast    sertraline  50 mg Oral Daily     Social History:     Social History     Socioeconomic History    Marital status: Single     Spouse name: Not on file    Number of children: Not on file    Years of education: Not on file    Highest education level: Not on file   Occupational History    Not on file   Social Needs    Financial resource strain: Not on file    Food insecurity     Worry: Not on file     Inability: Not on file    Transportation needs     Medical: Not on file     Non-medical: Not on file   Tobacco Use    Smoking status: Current Every Day Smoker     Packs/day: 1.00    Smokeless tobacco: Current User   Substance and Sexual Activity    Alcohol use: Not Currently    Drug use: Never    Sexual activity: Not on file   Lifestyle    Physical activity     Days per week: Not on file     Minutes per session: Not on file    Stress: Not on file   Relationships    Social connections     Talks on phone: Not on file     Gets together: Not on file     Attends Nondenominational service: Not on file     Active member of club or organization: Not on file     Attends meetings of clubs or organizations: Not on file     Relationship status: Not on file    Intimate partner violence     Fear of current or ex partner: Not on file     Emotionally abused: Not on file     Physically abused: Not on file     Forced sexual activity: Not on file Other Topics Concern    Not on file   Social History Narrative    Not on file     Family History:     Family History   Problem Relation Age of Onset    Alcohol Abuse Mother     Cirrhosis Mother       Medical Decision Making:   I have independently reviewed/ordered the following labs:    CBC with Differential:   Recent Labs     01/13/21  0600 01/14/21  0508   WBC 7.6 9.6   HGB 10.2* 9.8*   HCT 34.4* 33.5*   * 662*   LYMPHOPCT 28 19*   MONOPCT 11 9     BMP:  Recent Labs     01/13/21  0600 01/14/21  0508    137   K 4.5 4.4    102   CO2 24 25   BUN 11 15   CREATININE 0.36* 0.34*   MG 1.9 1.8     Hepatic Function Panel:   Recent Labs     01/13/21  0600 01/14/21  0508   PROT 6.6 6.4   LABALBU 3.2* 3.2*   BILIDIR <0.08 <0.08   IBILI CANNOT BE CALCULATED CANNOT BE CALCULATED   BILITOT <0.10* <0.10*   ALKPHOS 71 72   ALT 9 10   AST 25 15     Lab Results   Component Value Date    CREATININE 0.34 01/14/2021    GLUCOSE 133 01/14/2021     Detailed results: Thank you for allowing us to participate in the care of this patient. Please call with questions. This note is created with the assistance of a speech recognition program.  While intending to generate adocument that actually reflects the content of the visit, the document can still have some errors including those of syntax and sound a like substitutions which may escape proof reading. It such instances, actual meaningcan be extrapolated by contextual diversion. Morro Mccullough MD  PGY-1, Internal Medicine Resident  [facility]  1/14/2021 11:55 AM

## 2021-01-14 NOTE — PROGRESS NOTES
Impression:  1. No acute intracranial abnormality. Specifically, no acute infarction, mesial temporal sclerosis, or intracranial metastatic disease. 2. Mild parenchymal volume loss. Sequela of mild chronic microvascular   ischemic changes. CXR 1/5/21  Left basilar opacity and left perihilar infiltrate      I have personally reviewed the past medical history, past surgical history, medications, social history, and family history, and I haveupdated the database accordingly. Allergies:   Ceftriaxone     Review of Systems:     Review of Systems   Constitutional: Positive for activity change. Negative for appetite change, chills, diaphoresis, fatigue, fever and unexpected weight change. HENT: Negative for congestion, drooling, nosebleeds, rhinorrhea, sneezing, sore throat and trouble swallowing. Eyes: Negative for photophobia, redness and visual disturbance. Respiratory: Negative for cough, choking, chest tightness, shortness of breath, wheezing and stridor. Cardiovascular: Negative for chest pain and leg swelling. Gastrointestinal: Negative for abdominal distention, abdominal pain, constipation, diarrhea, nausea and vomiting. Endocrine: Negative for heat intolerance, polydipsia, polyphagia and polyuria. Genitourinary: Negative for decreased urine volume, difficulty urinating, dysuria, flank pain (Some left sided discomfort), frequency, hematuria, pelvic pain, urgency and vaginal pain. Musculoskeletal: Negative for arthralgias, back pain, joint swelling and myalgias. Skin: Negative for color change, rash and wound. Allergic/Immunologic: Negative for immunocompromised state. Neurological: Negative for dizziness, seizures, syncope, weakness, light-headedness, numbness and headaches. Hematological: Negative for adenopathy. Psychiatric/Behavioral: Negative for agitation, confusion, decreased concentration, self-injury and sleep disturbance. The patient is not nervous/anxious. Physical Examination :     Physical Exam  Constitutional:       General: She is not in acute distress. Appearance: Normal appearance. She is normal weight. She is not ill-appearing or toxic-appearing. HENT:      Head: Normocephalic and atraumatic. Nose: Nose normal. No congestion or rhinorrhea. Mouth/Throat:      Mouth: Mucous membranes are moist.      Pharynx: Oropharynx is clear. No oropharyngeal exudate or posterior oropharyngeal erythema. Eyes:      General: No scleral icterus. Conjunctiva/sclera: Conjunctivae normal.      Pupils: Pupils are equal, round, and reactive to light. Neck:      Musculoskeletal: Neck supple. No muscular tenderness. Cardiovascular:      Rate and Rhythm: Normal rate and regular rhythm. Pulses: Normal pulses. Heart sounds: Normal heart sounds. No murmur. No friction rub. No gallop. Pulmonary:      Effort: No respiratory distress. Breath sounds: Normal breath sounds. No stridor. No wheezing. Chest:      Chest wall: No tenderness. Abdominal:      General: There is no distension. Palpations: Abdomen is soft. There is no mass. Tenderness: There is no abdominal tenderness. There is no guarding or rebound. Comments: Left UQ  drain w pus   Genitourinary:     Comments: Urina cristiano  Musculoskeletal: Normal range of motion. General: No swelling. Right lower leg: No edema. Left lower leg: No edema. Skin:     Coloration: Skin is not jaundiced or pale. Findings: No bruising, erythema, lesion or rash. Neurological:      General: No focal deficit present. Mental Status: She is alert and oriented to person, place, and time. Cranial Nerves: No cranial nerve deficit. Sensory: No sensory deficit. Motor: No weakness.       Coordination: Coordination normal.      Deep Tendon Reflexes: Reflexes normal.   Psychiatric:         Mood and Affect: Mood normal.         Behavior: Behavior normal.  potassium bicarb-citric acid  40 mEq Oral Daily    sodium chloride flush  10 mL Intravenous 2 times per day    ferrous sulfate  325 mg Oral Daily with breakfast    sertraline  50 mg Oral Daily     Social History:     Social History     Socioeconomic History    Marital status: Single     Spouse name: Not on file    Number of children: Not on file    Years of education: Not on file    Highest education level: Not on file   Occupational History    Not on file   Social Needs    Financial resource strain: Not on file    Food insecurity     Worry: Not on file     Inability: Not on file    Transportation needs     Medical: Not on file     Non-medical: Not on file   Tobacco Use    Smoking status: Current Every Day Smoker     Packs/day: 1.00    Smokeless tobacco: Current User   Substance and Sexual Activity    Alcohol use: Not Currently    Drug use: Never    Sexual activity: Not on file   Lifestyle    Physical activity     Days per week: Not on file     Minutes per session: Not on file    Stress: Not on file   Relationships    Social connections     Talks on phone: Not on file     Gets together: Not on file     Attends Judaism service: Not on file     Active member of club or organization: Not on file     Attends meetings of clubs or organizations: Not on file     Relationship status: Not on file    Intimate partner violence     Fear of current or ex partner: Not on file     Emotionally abused: Not on file     Physically abused: Not on file     Forced sexual activity: Not on file   Other Topics Concern    Not on file   Social History Narrative    Not on file     Family History:     Family History   Problem Relation Age of Onset    Alcohol Abuse Mother     Cirrhosis Mother       Medical Decision Making:   I have independently reviewed/ordered the following labs:    CBC with Differential:   Recent Labs     01/13/21  0600 01/14/21  0508   WBC 7.6 9.6   HGB 10.2* 9.8*   HCT 34.4* 33.5* * 662*   LYMPHOPCT 28 19*   MONOPCT 11 9     BMP:  Recent Labs     01/13/21  0600 01/14/21  0508    137   K 4.5 4.4    102   CO2 24 25   BUN 11 15   CREATININE 0.36* 0.34*   MG 1.9 1.8     Hepatic Function Panel:   Recent Labs     01/13/21  0600 01/14/21  0508   PROT 6.6 6.4   LABALBU 3.2* 3.2*   BILIDIR <0.08 <0.08   IBILI CANNOT BE CALCULATED CANNOT BE CALCULATED   BILITOT <0.10* <0.10*   ALKPHOS 71 72   ALT 9 10   AST 25 15     Lab Results   Component Value Date    CREATININE 0.34 01/14/2021    GLUCOSE 133 01/14/2021     Detailed results: Thank you for allowing us to participate in the care of this patient. Please call with questions. This note is created with the assistance of a speech recognition program.  While intending to generate adocument that actually reflects the content of the visit, the document can still have some errors including those of syntax and sound a like substitutions which may escape proof reading. It such instances, actual meaningcan be extrapolated by contextual diversion. Morro Chand MD  PGY-1, Internal Medicine Resident  [id number] [facility]  1/14/2021 4:46 PM       I have discussed the care of the patient, including pertinent history and exam findings,  with the resident. I have seen and examined the patient and the key elements of all parts of the encounter have been performed by me. I agree with the assessment, plan and orders as documented by the resident.     Anushka Ho, Infectious Diseases

## 2021-01-14 NOTE — PROGRESS NOTES
[facility]  Internal Medicine Teaching Residency Program  Inpatient Daily Progress Note  ______________________________________________________________________________    Patient: Darrel De La Cruz [facility]  YOB: 1965   WBL:[id number]    Acct: [de-identified]     Room: [id number]  Admit date: 1/4/2021  Today's date: 01/14/21  Number of days in the hospital: 10    SUBJECTIVE   Admitting Diagnosis: Status epilepticus  CC: Seizures    Pt examined at bedside. Chart & results reviewed. No acute events overnight  Vitals stable, afebrile. Had no acute issues overnight. Patient is currently on Gwenette Milling for splenic abscess as per recommendations of the ID team, likely discharge on Invanz 1 g daily x 30 days. S/p  thoracentesis 1/13/20 with 500 ml  pleural fluid  Clear yellow drained and sent for cultures. Pleural fluid studies showed LDH of 121, ph of 8, total protein of 4.1 and culture pending. S/p cath yesterday with non obstructive cath with single vessel CAD 70 % Stenosis. Will need Home health care and physical therapy. Discharge today.      ROS:  Constitutional:  negative for chills, fevers, sweats  Respiratory:  negative for cough, dyspnea on exertion, hemoptysis, shortness of breath, wheezing  Cardiovascular:  negative for chest pain, chest pressure/discomfort, lower extremity edema, palpitations  Gastrointestinal:  negative for abdominal pain, constipation, diarrhea, nausea, vomiting  Neurological:  negative for dizziness, headache  BRIEF HISTORY 40-year-old female with history of diabetes, GERD, metastatic ovarian cancer that is post bilateral salpingo-oophorectomy, hysterectomy who presented as a transfer from [place] ER for status epilepticus. Viktoria Leon was found altered by family and upon EMS arrival she was noted to have a witnessed seizure and was given 4 mg of Versed without cessation of seizures. Marixa Mash was an attempted nasal intubation which was unsuccessful and patient was transported to [place] ED.  Patient was intubated and started on propofol.  Once propofol was started clinical seizure activity had resolved.  Patient was noted to have marked leukocytosis and broad-spectrum antibiotics with Vanco and Zosyn were started.  Patient was also given 5 L of LR for fluid resuscitation.     With regards to seizures, her initial diagnosis was in October 2020 where she was noted to have an MRI concerning for PRES. She was discharged home on Keppra 1500 mg twice daily.     Patient also had a recent visit at [address] [place] [address] she  had splenic embolization and subsequent splenic abscess requiring drain placement.  Was noted to be on Invanz per records.     With regards to her ovarian cancer, she follows up with Dr. Grant Farr:   Patient [facility] ED and was noted to be agitated and biting her ET tube.  She was continued on propofol which was increased to 60 mics due to agitation. Patient was admitted to the neuro ICU.  She was continued on broad-spectrum antibiotics.  ID consult was obtained.  Antibiotics were switched to meropenem.  Preliminary culture with gram-negative rods.  General surgery was consulted with no acute surgical intervention at this time. Tolerated extubation and did well. Heme/onc on board for her metastatic ovarian ca. Transferred to floors for further management.     OBJECTIVE     Vital Signs: BP (!) 85/42   Pulse 87   Temp 98.1 °F (36.7 °C) (Oral)   Resp 14   Ht 5' 6\" (1.676 m)   Wt 166 lb 7.2 oz (75.5 kg)   SpO2 96%   BMI 26.87 kg/m²     Temp (24hrs), Av.5 °F (36.9 °C), Min:98.1 °F (36.7 °C), Max:98.7 °F (37.1 °C)    In: 450   Out: -     Physical Exam:    General appearance - alert, in no distress  Mental status - alert, oriented to person, place, and time  Eyes - pupils equal and reactive, extraocular eye movements intact  Mouth - mucous membranes moist, pharynx normal without lesions  Neck - supple, no significant adenopathy  Chest -   clear to auscultation, has minimal Rales bilaterally  Heart - normal rate, regular rhythm, normal S1, S2  Abdomen - soft, nontender, nondistended  Neurological - alert, oriented, normal speech, no focal deficits   Extremities - peripheral pulses normal, no pedal edema  Skin - normal coloration and turgor, no rashes     Medications:  Scheduled Medications:    levETIRAcetam  1,500 mg Oral Q12H    sodium chloride flush  10 mL Intravenous 2 times per day    atorvastatin  40 mg Oral Daily    hydrocortisone   Topical BID    lisinopril  2.5 mg Oral Daily    metoprolol succinate  25 mg Oral Daily    aspirin  81 mg Oral Daily    pantoprazole  40 mg Oral BID AC    [Held by provider] enoxaparin  40 mg Subcutaneous Daily    potassium bicarb-citric acid  40 mEq Oral Daily    meropenem  1 g Intravenous Q8H    sodium chloride flush  10 mL Intravenous 2 times per day    ferrous sulfate  325 mg Oral Daily with breakfast    sertraline  50 mg Oral Daily     Continuous Infusions:     PRN Medications    sodium chloride flush, 10 mL, PRN      acetaminophen, 650 mg, Q4H PRN      HYDROcodone-acetaminophen, 2 tablet, Q6H PRN      melatonin, 3 mg, Nightly PRN      magnesium sulfate, 1 g, PRN      sodium chloride flush, 10 mL, PRN      sodium chloride flush, 10 mL, PRN      promethazine, 12.5 mg, Q6H PRN    Or      ondansetron, 4 mg, Q6H PRN   polyethylene glycol, 17 g, Daily PRN      acetaminophen, 650 mg, Q6H PRN    Or      acetaminophen, 650 mg, Q6H PRN        Diagnostic Labs:  CBC:   Recent Labs     01/12/21  0441 01/13/21  0600 01/14/21  0508   WBC 8.9 7.6 9.6   RBC 4.29 4.41 4.23   HGB 9.9* 10.2* 9.8*   HCT 34.7* 34.4* 33.5*   MCV 80.9* 78.0* 79.2*   RDW 25.9* 26.1* 25.6*   * 568* 662*     BMP:   Recent Labs     01/11/21  0938 01/12/21  0441 01/13/21  0600    132* 136   K 4.5 4.4 4.5    102 101   CO2 25 24 24   BUN 11 15 11   CREATININE 0.29* 0.35* 0.36*     BNP: No results for input(s): BNP in the last 72 hours. PT/INR: No results for input(s): PROTIME, INR in the last 72 hours. APTT:   No results for input(s): APTT in the last 72 hours. CARDIAC ENZYMES: No results for input(s): CKMB, CKMBINDEX, TROPONINI in the last 72 hours.     Invalid input(s): CKTOTAL;3  FASTING LIPID PANEL:No results found for: CHOL, HDL, TRIG  LIVER PROFILE:   Recent Labs     01/11/21  0938 01/12/21 0441 01/13/21  0600   AST 12 13 25   ALT 6 7 9   BILIDIR <0.08 <0.08 <0.08   BILITOT <0.10* <0.10* <0.10*   ALKPHOS 75 73 71      MICROBIOLOGY:   Lab Results   Component Value Date/Time    CULTURE PENDING 01/13/2021 09:07 AM       Imaging:    Ct Head Wo Contrast    Result Date: 1/4/2021 No acute intracranial hemorrhage, mass-effect, midline shift, or abnormal extra-axial collection. In the setting of known malignant neoplasm with seizure, contrast-enhanced brain MRI is recommended to further evaluate for intracranial metastatic disease. Minimal regions of low attenuation within the periventricular, subcortical, and deep white matter, presumably sequelae of chronic microangiopathic ischemic white matter change, though ultimately age indeterminate without prior comparison imaging. Recommend attention on follow-up. Heterogeneous mineralization is suggested within the calvarium, clivus, and partially imaged upper cervical spine, suspicious for osseous metastatic disease in the setting of a known primary neoplasm. Attention on follow-up MRI. Air-fluid levels with aerated secretions in the sphenoid sinuses and ethmoid air cells, which may relate to intubated status versus underlying pre-existing acute sinusitis. Xr Chest Portable    Result Date: 1/7/2021  Support lines, as detailed Interval progression in now moderate vascular congestion Stable moderate left basilar opacity related to combined pleural-parenchymal process with slight progression in mild streaky right basilar atelectasis     Xr Chest Portable    Result Date: 1/6/2021  Overall stable examination. Cardiomegaly and pulmonary vascular congestion. Small left pleural effusion versus pleural scarring. Xr Chest Portable    Result Date: 1/5/2021  Stable chest Stable support lines Possible mild edema with moderate left perihilar, left basilar opacity related to combined pleuroparenchymal process     Xr Chest Portable    Result Date: 1/4/2021  ET tube as above. Life support apparatus otherwise stable. Mild-to-moderate edema and small left effusion unchanged.      Xr Chest Portable    Result Date: 1/4/2021 Small to moderate left pleural effusion with adjacent airspace disease. Left-sided chest tube terminates overlying the left lung base. Endotracheal and enteric tubes as above. Xr Abdomen For Ng/og/ne Tube Placement    Result Date: 1/4/2021  Enteric tube in the stomach. Other incidental findings as above. Ct Chest Abdomen Pelvis W Contrast    Result Date: 1/5/2021  Interval placement of a percutaneous pigtail catheter left splenic complex mass. Mesenteric, retroperitoneal, and left inguinal pathologic lymphadenopathy consistent with metastatic disease. Multiple pulmonary nodules consistent with metastatic disease. Prominent right hilar lymph node. New left lower lobe consolidation and effusion. Diffuse bony blastic metastatic disease. Mri Brain W Wo Contrast    Result Date: 1/5/2021  1. No acute intracranial abnormality. Specifically, no acute infarction, mesial temporal sclerosis, or intracranial metastatic disease. 2. Mild parenchymal volume loss. Sequela of mild chronic microvascular ischemic changes. ASSESSMENT & PLAN     ASSESSMENT / PLAN:       Principal Problem:    Status epilepticus ([address]  Active Problems:    Seizure ([address]    Splenic abscess    History of malignant neoplasm of ovary    Pleural effusion  Resolved Problems:    * No resolved hospital problems. *    Status epilepticus   - continue on keppra 1.5 mg twice daily and op follow up with neurology in 4-6 weeks.     Acute respiratory failure requiring intubation secondary to status epilepticus:  -currently extubated and saturating well on room air     Status epilepticus with breakthrough seizures:  -Patient is currently on Keppra 1500 mg twice daily.  -Further recommendations as per Neurology.     Metastatic ovarian cancer, stage IV:  -continue supportive care.    -Patient with guarded prognosis per oncology.    Patient and family interested in more active treatment.  -Heme/onco want to follow the patient as an outpatient

## 2021-01-15 DIAGNOSIS — C56.9 MALIGNANT NEOPLASM OF OVARY, UNSPECIFIED LATERALITY (HCC): Primary | ICD-10-CM

## 2021-01-15 DIAGNOSIS — F51.01 PRIMARY INSOMNIA: Primary | ICD-10-CM

## 2021-01-15 RX ORDER — HYDROCODONE BITARTRATE AND ACETAMINOPHEN 5; 325 MG/1; MG/1
1 TABLET ORAL EVERY 6 HOURS PRN
Qty: 120 TABLET | Refills: 0 | Status: ON HOLD | OUTPATIENT
Start: 2021-01-15 | End: 2021-01-31 | Stop reason: HOSPADM

## 2021-01-15 RX ORDER — ZOLPIDEM TARTRATE 5 MG/1
5 TABLET ORAL NIGHTLY PRN
Qty: 30 TABLET | Refills: 0 | Status: SHIPPED | OUTPATIENT
Start: 2021-01-15 | End: 2021-02-14

## 2021-01-15 NOTE — TELEPHONE ENCOUNTER
Will called and requested Norco for his mother. States she has back pain. Called and spoke to Dr Starr Sanchez and I will send this to him.

## 2021-01-18 ENCOUNTER — TELEPHONE (OUTPATIENT)
Dept: ONCOLOGY | Age: 56
End: 2021-01-18

## 2021-01-18 ENCOUNTER — TELEPHONE (OUTPATIENT)
Dept: PULMONOLOGY | Age: 56
End: 2021-01-18

## 2021-01-18 NOTE — TELEPHONE ENCOUNTER
Name: Lurdes Cerda [facility]  : 1965  MRN: [id number]    Oncology Navigation Follow-Up Note    Contact Type:  Telephone  Notes: Upon review of chart noted pt dc'd home & scheduled for Dr. Edilma Mccullough post hospital f/u . Will continue to follow.     Electronically signed by Frida Rehman RN on 2021 at 11:32 AM

## 2021-01-18 NOTE — TELEPHONE ENCOUNTER
----- Message from Rochelle Sousa sent at 1/18/2021  7:08 AM EST -----  Denice Mandel, please see message from Dr Alonso Dior and call to schedule. Please note follow up requested with Dr Mattie Calles as well. Thank you.   ----- Message -----  From: Brendan Sotelo MD  Sent: 1/16/2021   6:24 PM EST  To: [facility] Respiratory Spec Clinical Staff    Please have the patient follow-up with me as requested.   Thank  ----- Message -----  From: Brendan Sotelo MD  Sent: 1/14/2021   8:26 PM EST  To: Brendan Sotelo MD

## 2021-01-19 LAB
CULTURE: ABNORMAL
DIRECT EXAM: ABNORMAL
DIRECT EXAM: ABNORMAL
Lab: ABNORMAL
SPECIMEN DESCRIPTION: ABNORMAL

## 2021-01-20 NOTE — DISCHARGE SUMMARY
[address]     Department of Internal Medicine - Staff Internal Medicine Teaching Service    INPATIENT DISCHARGE SUMMARY      Patient Identification:  Huang Mendez is a 60 y.o. female. :  1965  MRN: [id number]     Acct: [de-identified]   PCP: No primary care provider on file. Admit Date:  2021  Discharge date and time: 2021  6:22 PM   Attending Provider: No att. providers found                                     [facility] Problem Lists:  Principal Problem:    Status epilepticus ([address]  Active Problems:    Seizure ([address]    Splenic abscess    History of malignant neoplasm of ovary    Pleural effusion    Bandemia  Resolved Problems:    * No resolved hospital problems. [facility] STAY     Brief Inpatient course:   Huang Mendez is a 60 y.o. female who was admitted for the management of Status epilepticus [facility]), presented to the emergency department  as a transfer from [facility] ER for status epilepticus. Patient was intubated and started on propofol.  Once propofol was started clinical seizure activity had resolved.  Patient was noted to have marked leukocytosis and broad-spectrum antibiotics with Vanco and Zosyn were started.  Patient was also given 5 L of LR for fluid resuscitation.   Patient also had a recent visit at [address] [place] [address], [place] [address] she  had splenic embolization and subsequent splenic abscess requiring drain placement.  Was noted to be on Invanz per records  With regards to her ovarian cancer, she follows up with Dr. Denis Leary. Patient was admitted to the neuro ICU.  She was continued on broad-spectrum antibiotics.  ID consult was obtained.  Antibiotics were switched to meropenem.  Preliminary culture with gram-negative rods.  General surgery was consulted with no acute surgical intervention at this time. Tolerated extubation and did well. Heme/onc on board for her metastatic ovarian ca. Transferred to floors for further management of   Status epilepticus  continued on keppra 1.5 mg twice daily and op follow up with neurology in 4-6 weeks. Acute respiratory failure requiring intubation secondary to status epilepticus: extubated and saturating well on room air and OP follow up. Metastatic ovarian cancer, stage IV:  Patient with guarded prognosis per oncology.  Patient and family interested in more active treatment. Heme/onco want to follow the patient as an outpatient Exudative pleural effusion, likely related to ovarian cancer metastasis: S/P IR guided thoracocentesis, pending cultures. ID was on board and was continued on Ertapenem & discharged on the same. Splenic abscess s/p drainage in December 2020:  Discharged on Invanz 1 g daily for 30 days. ,Surgery was consulted, no intervention at this time., Repeat ultrasound spleen at 2 weeks time to see if the abscess is cleared. NSTEMI:S/P cath ,reduced EF 40 % on echo, on aspirin, Lipitor, lisinorpil 2.5 mg and metoprolol  Succinate 25 mg. Chronic combined systolic and diastolic congestive heart failure: Echo done on 01/04/2021 showed EF of 41%, Continued on blood pressure control Anemia secondary to malignancy and medications along with infection oral ferrous sulphate at home, Op management of Anemia with PCP. Sacral decubitus ulcers wound care        Pt was discharged to home with home care.        Procedures/ Significant Interventions:      EEG monitoring,   Cardiac catheterization  Pleural tap    Consults:     Consults:     Final Specialist Recommendations/Findings:   IP CONSULT TO NEUROCRITICAL CARE  IP CONSULT TO DIETITIAN  IP CONSULT TO HEM/ONC  IP CONSULT TO CARDIOLOGY  IP CONSULT TO INFECTIOUS DISEASES  IP CONSULT TO GENERAL SURGERY  IP CONSULT TO HOME CARE NEEDS      Any Hospital Acquired Infections: none    Discharge Functional Status:  stable    DISCHARGE PLAN     Disposition: home Patient Instructions:   Discharge Medication List as of 1/15/2021 10:17 AM      START taking these medications    Details   amoxicillin-clavulanate (AUGMENTIN) 875-125 MG per tablet Take 1 tablet by mouth every 12 hours pls do the blood test weekly - if the urine becomes dark or the right upper abd start being sore, pls call DR MARTÍNEZ, Disp-60 tablet, R-0Normal      lisinopril (PRINIVIL;ZESTRIL) 2.5 MG tablet Take 1 tablet by mouth daily, Disp-30 tablet, R-3Normal      metoprolol succinate (TOPROL XL) 25 MG extended release tablet Take 1 tablet by mouth daily, Disp-30 tablet, R-3Normal         CONTINUE these medications which have NOT CHANGED    Details   metFORMIN (GLUCOPHAGE-XR) 500 MG extended release tablet Take 2 tablets by mouth twice daily, Disp-30 tablet, R-0Normal      levETIRAcetam (KEPPRA) 750 MG tablet Take 2 tablets by mouth 2 times daily, Disp-120 tablet,R-2Print      omeprazole (PRILOSEC) 20 MG delayed release capsule Take 20 mg by mouth dailyHistorical Med      prochlorperazine (COMPAZINE) 10 MG tablet Take 10 mg by mouth every 6 hours as neededHistorical Med      tiZANidine (ZANAFLEX) 4 MG tablet Take 4 mg by mouth every 6 hours as neededHistorical Med      ibuprofen (ADVIL;MOTRIN) 200 MG tablet Take 200 mg by mouth every 6 hours as needed for PainHistorical Med      HYDROcodone-acetaminophen (NORCO) 5-325 MG per tablet Take 1 tablet by mouth every 6 hours as needed for Pain. Historical Med      pantoprazole (PROTONIX) 40 MG tablet Take 40 mg by mouth 2 times dailyHistorical Med      ferrous sulfate (IRON 325) 325 (65 Fe) MG tablet Take 1 tablet by mouth daily (with breakfast), Disp-60 tablet,R-5Normal      aspirin 81 MG chewable tablet Take 81 mg by mouth nightlyHistorical Med      simvastatin (ZOCOR) 40 MG tablet Take 40 mg by mouth nightlyHistorical Med      sertraline (ZOLOFT) 100 MG tablet Take 50 mg by mouth daily Historical Med         STOP taking these medications ertapenem [facility]) infusion Comments:   Reason for Stopping:         oxyCODONE (OXYCONTIN) 10 MG extended release tablet Comments:   Reason for Stopping:         oxyCODONE-acetaminophen (PERCOCET)  MG per tablet Comments:   Reason for Stopping:         amLODIPine (NORVASC) 10 MG tablet Comments:   Reason for Stopping:               Activity: activity as tolerated    Diet: cardiac diet    Follow-up:    Cullen Lee MD  [address] [place] [address],  [address], [address]    Schedule an appointment as soon as possible for a visit in 3 weeks  infect [address] [place] [address],, [address] [place] [address]. [facility]  [phone]  On 1/26/2021  2:15 pm at F/u Cath at [facility]  [address] Mario Segovia MD  [address] [phone]    In 2 weeks      Valarie Sexton MD  [address]  [address]  [address]  [phone]    In 2 weeks        Patient Instructions:   Please follow up with PCP, pulmonology, heart and infectious disease doctor instructions given during discharge. Continue to take medications as prescribed. Continue to monitor drainage   May discontinue antibiotics early   CBC and Creatine weekly for 5 weeks    Follow up labs: CBC, BMP once a week for 5 days, LFT     Follow up imaging: CT abdomen and pelvis with IV contrast.       Note that over 30 minutes was spent in preparing discharge papers, discussing discharge with patient, medication review, etc.      Betty Hawkins MD,  Internal Medicine Resident, [facility];  [place], [place]  1/20/2021, 1:25 PM

## 2021-01-22 ENCOUNTER — VIRTUAL VISIT (OUTPATIENT)
Dept: ONCOLOGY | Age: 56
End: 2021-01-22
Payer: COMMERCIAL

## 2021-01-22 DIAGNOSIS — C56.9 MALIGNANT NEOPLASM OF OVARY, UNSPECIFIED LATERALITY (HCC): Primary | ICD-10-CM

## 2021-01-22 PROCEDURE — 99214 OFFICE O/P EST MOD 30 MIN: CPT | Performed by: INTERNAL MEDICINE

## 2021-01-22 PROCEDURE — 99211 OFF/OP EST MAY X REQ PHY/QHP: CPT | Performed by: INTERNAL MEDICINE

## 2021-01-25 ENCOUNTER — TELEPHONE (OUTPATIENT)
Dept: ONCOLOGY | Age: 56
End: 2021-01-25

## 2021-01-25 DIAGNOSIS — C56.9 MALIGNANT NEOPLASM OF OVARY, UNSPECIFIED LATERALITY (HCC): Primary | ICD-10-CM

## 2021-01-25 RX ORDER — METFORMIN HYDROCHLORIDE 500 MG/1
TABLET, EXTENDED RELEASE ORAL
Qty: 30 TABLET | Refills: 0 | Status: SHIPPED | OUTPATIENT
Start: 2021-01-25 | End: 2021-02-26 | Stop reason: SDUPTHER

## 2021-01-25 NOTE — TELEPHONE ENCOUNTER
AVS from 1.22.21     RV or virtual visit in 2 weeks    RVV scheduled 2/5/21 @ 3:45pm    Electronically signed by Davon Gomez on 1/25/2021 at 9:00 AM

## 2021-01-26 ENCOUNTER — HOSPITAL ENCOUNTER (OUTPATIENT)
Facility: CLINIC | Age: 56
Discharge: HOME | End: 2021-01-26
Payer: COMMERCIAL

## 2021-01-26 DIAGNOSIS — D73.3 SPLEEN, ABSCESS: ICD-10-CM

## 2021-01-26 DIAGNOSIS — D73.89 SPLENIC LESION: ICD-10-CM

## 2021-01-26 DIAGNOSIS — D73.3 SPLENIC ABSCESS: ICD-10-CM

## 2021-01-26 LAB
ABSOLUTE EOS #: 0.41 K/UL (ref 0–0.44)
ABSOLUTE IMMATURE GRANULOCYTE: 0.1 K/UL (ref 0–0.3)
ABSOLUTE LYMPH #: 2.47 K/UL (ref 1.1–3.7)
ABSOLUTE MONO #: 0.72 K/UL (ref 0.1–1.2)
ALBUMIN SERPL-MCNC: 4.1 G/DL (ref 3.5–5.2)
ALBUMIN/GLOBULIN RATIO: 1.1 (ref 1–2.5)
ALP BLD-CCNC: 82 U/L (ref 35–104)
ALT SERPL-CCNC: 14 U/L (ref 5–33)
AST SERPL-CCNC: 21 U/L
BASOPHILS # BLD: 1 % (ref 0–2)
BASOPHILS ABSOLUTE: 0.1 K/UL (ref 0–0.2)
BILIRUB SERPL-MCNC: 0.18 MG/DL (ref 0.3–1.2)
BILIRUBIN DIRECT: <0.08 MG/DL
BILIRUBIN, INDIRECT: ABNORMAL MG/DL (ref 0–1)
CREAT SERPL-MCNC: 0.43 MG/DL (ref 0.5–0.9)
DIFFERENTIAL TYPE: ABNORMAL
EOSINOPHILS RELATIVE PERCENT: 4 % (ref 1–4)
GFR AFRICAN AMERICAN: >60 ML/MIN
GFR NON-AFRICAN AMERICAN: >60 ML/MIN
GFR SERPL CREATININE-BSD FRML MDRD: ABNORMAL ML/MIN/{1.73_M2}
GFR SERPL CREATININE-BSD FRML MDRD: ABNORMAL ML/MIN/{1.73_M2}
GLOBULIN: ABNORMAL G/DL (ref 1.5–3.8)
HCT VFR BLD CALC: 41.4 % (ref 36.3–47.1)
HEMOGLOBIN: 12 G/DL (ref 11.9–15.1)
IMMATURE GRANULOCYTES: 1 %
LYMPHOCYTES # BLD: 24 % (ref 24–43)
MCH RBC QN AUTO: 23.7 PG (ref 25.2–33.5)
MCHC RBC AUTO-ENTMCNC: 29 G/DL (ref 28.4–34.8)
MCV RBC AUTO: 81.8 FL (ref 82.6–102.9)
MONOCYTES # BLD: 7 % (ref 3–12)
MORPHOLOGY: ABNORMAL
NRBC AUTOMATED: 0 PER 100 WBC
PDW BLD-RTO: 25.3 % (ref 11.8–14.4)
PLATELET # BLD: 679 K/UL (ref 138–453)
PLATELET ESTIMATE: ABNORMAL
PMV BLD AUTO: 10.7 FL (ref 8.1–13.5)
RBC # BLD: 5.06 M/UL (ref 3.95–5.11)
RBC # BLD: ABNORMAL 10*6/UL
SEG NEUTROPHILS: 63 % (ref 36–65)
SEGMENTED NEUTROPHILS ABSOLUTE COUNT: 6.5 K/UL (ref 1.5–8.1)
TOTAL PROTEIN: 7.8 G/DL (ref 6.4–8.3)
WBC # BLD: 10.3 K/UL (ref 3.5–11.3)
WBC # BLD: ABNORMAL 10*3/UL

## 2021-01-26 PROCEDURE — 80076 HEPATIC FUNCTION PANEL: CPT

## 2021-01-26 PROCEDURE — 85025 COMPLETE CBC W/AUTO DIFF WBC: CPT

## 2021-01-26 PROCEDURE — 82565 ASSAY OF CREATININE: CPT

## 2021-01-26 PROCEDURE — 36415 COLL VENOUS BLD VENIPUNCTURE: CPT

## 2021-01-27 ENCOUNTER — TELEMEDICINE (OUTPATIENT)
Dept: PULMONOLOGY | Age: 56
End: 2021-01-27
Payer: COMMERCIAL

## 2021-01-27 DIAGNOSIS — D73.3 SPLENIC ABSCESS: ICD-10-CM

## 2021-01-27 DIAGNOSIS — J90 PLEURAL EFFUSION: Primary | ICD-10-CM

## 2021-01-27 DIAGNOSIS — R56.9 SEIZURE (HCC): ICD-10-CM

## 2021-01-27 DIAGNOSIS — Z85.43 HISTORY OF MALIGNANT NEOPLASM OF OVARY: ICD-10-CM

## 2021-01-27 PROCEDURE — 99213 OFFICE O/P EST LOW 20 MIN: CPT | Performed by: INTERNAL MEDICINE

## 2021-01-27 RX ORDER — OXYCODONE HYDROCHLORIDE AND ACETAMINOPHEN 5; 325 MG/1; MG/1
TABLET ORAL
Status: ON HOLD | COMMUNITY
Start: 2020-12-24 | End: 2021-01-31 | Stop reason: HOSPADM

## 2021-01-28 ENCOUNTER — HOSPITAL ENCOUNTER (INPATIENT)
Age: 56
LOS: 3 days | Discharge: HOME | DRG: 543 | End: 2021-01-31
Attending: EMERGENCY MEDICINE | Admitting: INTERNAL MEDICINE
Payer: COMMERCIAL

## 2021-01-28 ENCOUNTER — APPOINTMENT (OUTPATIENT)
Dept: CT IMAGING | Age: 56
DRG: 543 | End: 2021-01-28
Payer: COMMERCIAL

## 2021-01-28 DIAGNOSIS — C56.9 MALIGNANT NEOPLASM OF OVARY, UNSPECIFIED LATERALITY (HCC): ICD-10-CM

## 2021-01-28 DIAGNOSIS — R11.2 NAUSEA AND VOMITING, INTRACTABILITY OF VOMITING NOT SPECIFIED, UNSPECIFIED VOMITING TYPE: ICD-10-CM

## 2021-01-28 DIAGNOSIS — C79.51 CANCER, METASTATIC TO BONE (HCC): ICD-10-CM

## 2021-01-28 DIAGNOSIS — M54.59 INTRACTABLE LOW BACK PAIN: Primary | ICD-10-CM

## 2021-01-28 DIAGNOSIS — C79.60 MALIGNANT NEOPLASM METASTATIC TO OVARY, UNSPECIFIED LATERALITY (HCC): ICD-10-CM

## 2021-01-28 PROBLEM — I10 ESSENTIAL HYPERTENSION: Status: ACTIVE | Noted: 2021-01-28

## 2021-01-28 LAB
ABSOLUTE EOS #: 0.4 K/UL (ref 0–0.44)
ABSOLUTE IMMATURE GRANULOCYTE: 0 K/UL (ref 0–0.3)
ABSOLUTE LYMPH #: 2.41 K/UL (ref 1.1–3.7)
ABSOLUTE MONO #: 0.8 K/UL (ref 0.1–1.2)
ANION GAP SERPL CALCULATED.3IONS-SCNC: 11 MMOL/L (ref 9–17)
BASOPHILS # BLD: 1 % (ref 0–2)
BASOPHILS ABSOLUTE: 0.13 K/UL (ref 0–0.2)
BUN BLDV-MCNC: 6 MG/DL (ref 6–20)
BUN/CREAT BLD: ABNORMAL (ref 9–20)
C-REACTIVE PROTEIN: 6.5 MG/L (ref 0–5)
CALCIUM SERPL-MCNC: 9.2 MG/DL (ref 8.6–10.4)
CHLORIDE BLD-SCNC: 103 MMOL/L (ref 98–107)
CO2: 25 MMOL/L (ref 20–31)
CREAT SERPL-MCNC: 0.33 MG/DL (ref 0.5–0.9)
DIFFERENTIAL TYPE: ABNORMAL
EOSINOPHILS RELATIVE PERCENT: 3 % (ref 1–4)
GFR AFRICAN AMERICAN: >60 ML/MIN
GFR NON-AFRICAN AMERICAN: >60 ML/MIN
GFR SERPL CREATININE-BSD FRML MDRD: ABNORMAL ML/MIN/{1.73_M2}
GFR SERPL CREATININE-BSD FRML MDRD: ABNORMAL ML/MIN/{1.73_M2}
GLUCOSE BLD-MCNC: 130 MG/DL (ref 70–99)
HCT VFR BLD CALC: 36 % (ref 36.3–47.1)
HEMOGLOBIN: 10.6 G/DL (ref 11.9–15.1)
IMMATURE GRANULOCYTES: 0 %
LACTIC ACID, WHOLE BLOOD: 1.5 MMOL/L (ref 0.7–2.1)
LACTIC ACID: NORMAL MMOL/L
LYMPHOCYTES # BLD: 18 % (ref 24–43)
MCH RBC QN AUTO: 24.7 PG (ref 25.2–33.5)
MCHC RBC AUTO-ENTMCNC: 29.4 G/DL (ref 28.4–34.8)
MCV RBC AUTO: 83.7 FL (ref 82.6–102.9)
MONOCYTES # BLD: 6 % (ref 3–12)
MORPHOLOGY: ABNORMAL
NRBC AUTOMATED: 0 PER 100 WBC
PDW BLD-RTO: 24.2 % (ref 11.8–14.4)
PLATELET # BLD: 628 K/UL (ref 138–453)
PLATELET ESTIMATE: ABNORMAL
PMV BLD AUTO: 9.6 FL (ref 8.1–13.5)
POTASSIUM SERPL-SCNC: 3.8 MMOL/L (ref 3.7–5.3)
RBC # BLD: 4.3 M/UL (ref 3.95–5.11)
RBC # BLD: ABNORMAL 10*6/UL
SEDIMENTATION RATE, ERYTHROCYTE: 70 MM (ref 0–30)
SEG NEUTROPHILS: 72 % (ref 36–65)
SEGMENTED NEUTROPHILS ABSOLUTE COUNT: 9.66 K/UL (ref 1.5–8.1)
SODIUM BLD-SCNC: 139 MMOL/L (ref 135–144)
WBC # BLD: 13.4 K/UL (ref 3.5–11.3)
WBC # BLD: ABNORMAL 10*3/UL

## 2021-01-28 PROCEDURE — 6360000002 HC RX W HCPCS

## 2021-01-28 PROCEDURE — 96376 TX/PRO/DX INJ SAME DRUG ADON: CPT

## 2021-01-28 PROCEDURE — 83605 ASSAY OF LACTIC ACID: CPT

## 2021-01-28 PROCEDURE — 1200000000 HC SEMI PRIVATE

## 2021-01-28 PROCEDURE — 87040 BLOOD CULTURE FOR BACTERIA: CPT

## 2021-01-28 PROCEDURE — 85652 RBC SED RATE AUTOMATED: CPT

## 2021-01-28 PROCEDURE — 6370000000 HC RX 637 (ALT 250 FOR IP): Performed by: STUDENT IN AN ORGANIZED HEALTH CARE EDUCATION/TRAINING PROGRAM

## 2021-01-28 PROCEDURE — 80048 BASIC METABOLIC PNL TOTAL CA: CPT

## 2021-01-28 PROCEDURE — 6360000002 HC RX W HCPCS: Performed by: STUDENT IN AN ORGANIZED HEALTH CARE EDUCATION/TRAINING PROGRAM

## 2021-01-28 PROCEDURE — 99285 EMERGENCY DEPT VISIT HI MDM: CPT

## 2021-01-28 PROCEDURE — 96375 TX/PRO/DX INJ NEW DRUG ADDON: CPT

## 2021-01-28 PROCEDURE — 74177 CT ABD & PELVIS W/CONTRAST: CPT

## 2021-01-28 PROCEDURE — 3209999900 CT LUMBAR SPINE TRAUMA RECONSTRUCTION

## 2021-01-28 PROCEDURE — 6360000004 HC RX CONTRAST MEDICATION: Performed by: STUDENT IN AN ORGANIZED HEALTH CARE EDUCATION/TRAINING PROGRAM

## 2021-01-28 PROCEDURE — 85025 COMPLETE CBC W/AUTO DIFF WBC: CPT

## 2021-01-28 PROCEDURE — 96374 THER/PROPH/DIAG INJ IV PUSH: CPT

## 2021-01-28 PROCEDURE — 86140 C-REACTIVE PROTEIN: CPT

## 2021-01-28 RX ORDER — SODIUM CHLORIDE 0.9 % (FLUSH) 0.9 %
10 SYRINGE (ML) INJECTION EVERY 12 HOURS SCHEDULED
Status: DISCONTINUED | OUTPATIENT
Start: 2021-01-29 | End: 2021-01-31 | Stop reason: HOSPADM

## 2021-01-28 RX ORDER — MORPHINE SULFATE 4 MG/ML
4 INJECTION, SOLUTION INTRAMUSCULAR; INTRAVENOUS ONCE
Status: COMPLETED | OUTPATIENT
Start: 2021-01-28 | End: 2021-01-28

## 2021-01-28 RX ORDER — ONDANSETRON 2 MG/ML
4 INJECTION INTRAMUSCULAR; INTRAVENOUS EVERY 6 HOURS PRN
Status: DISCONTINUED | OUTPATIENT
Start: 2021-01-28 | End: 2021-01-31 | Stop reason: HOSPADM

## 2021-01-28 RX ORDER — IBUPROFEN 400 MG/1
200 TABLET ORAL EVERY 6 HOURS PRN
Status: DISCONTINUED | OUTPATIENT
Start: 2021-01-28 | End: 2021-01-29

## 2021-01-28 RX ORDER — MORPHINE SULFATE 4 MG/ML
4 INJECTION, SOLUTION INTRAMUSCULAR; INTRAVENOUS EVERY 4 HOURS PRN
Status: DISCONTINUED | OUTPATIENT
Start: 2021-01-28 | End: 2021-01-29

## 2021-01-28 RX ORDER — LISINOPRIL 2.5 MG/1
2.5 TABLET ORAL DAILY
Status: DISCONTINUED | OUTPATIENT
Start: 2021-01-29 | End: 2021-01-31 | Stop reason: HOSPADM

## 2021-01-28 RX ORDER — ASPIRIN 81 MG/1
81 TABLET, CHEWABLE ORAL NIGHTLY
Status: DISCONTINUED | OUTPATIENT
Start: 2021-01-29 | End: 2021-01-31 | Stop reason: HOSPADM

## 2021-01-28 RX ORDER — ONDANSETRON 2 MG/ML
INJECTION INTRAMUSCULAR; INTRAVENOUS
Status: COMPLETED
Start: 2021-01-28 | End: 2021-01-28

## 2021-01-28 RX ORDER — ACETAMINOPHEN 650 MG/1
650 SUPPOSITORY RECTAL EVERY 6 HOURS PRN
Status: DISCONTINUED | OUTPATIENT
Start: 2021-01-28 | End: 2021-01-31 | Stop reason: HOSPADM

## 2021-01-28 RX ORDER — MORPHINE SULFATE 4 MG/ML
INJECTION, SOLUTION INTRAMUSCULAR; INTRAVENOUS
Status: DISPENSED
Start: 2021-01-28 | End: 2021-01-29

## 2021-01-28 RX ORDER — POLYETHYLENE GLYCOL 3350 17 G/17G
17 POWDER, FOR SOLUTION ORAL DAILY PRN
Status: DISCONTINUED | OUTPATIENT
Start: 2021-01-28 | End: 2021-01-31 | Stop reason: HOSPADM

## 2021-01-28 RX ORDER — METOPROLOL SUCCINATE 25 MG/1
25 TABLET, EXTENDED RELEASE ORAL DAILY
Status: DISCONTINUED | OUTPATIENT
Start: 2021-01-28 | End: 2021-01-31 | Stop reason: HOSPADM

## 2021-01-28 RX ORDER — METFORMIN HYDROCHLORIDE 500 MG/1
500 TABLET, EXTENDED RELEASE ORAL 2 TIMES DAILY WITH MEALS
Status: DISCONTINUED | OUTPATIENT
Start: 2021-01-29 | End: 2021-01-29

## 2021-01-28 RX ORDER — SODIUM CHLORIDE 0.9 % (FLUSH) 0.9 %
10 SYRINGE (ML) INJECTION PRN
Status: DISCONTINUED | OUTPATIENT
Start: 2021-01-28 | End: 2021-01-31 | Stop reason: HOSPADM

## 2021-01-28 RX ORDER — ACETAMINOPHEN 325 MG/1
650 TABLET ORAL EVERY 6 HOURS PRN
Status: DISCONTINUED | OUTPATIENT
Start: 2021-01-28 | End: 2021-01-31 | Stop reason: HOSPADM

## 2021-01-28 RX ORDER — ZOLPIDEM TARTRATE 5 MG/1
5 TABLET ORAL NIGHTLY PRN
Status: DISCONTINUED | OUTPATIENT
Start: 2021-01-29 | End: 2021-01-31 | Stop reason: HOSPADM

## 2021-01-28 RX ORDER — DIAZEPAM 5 MG/1
5 TABLET ORAL ONCE
Status: COMPLETED | OUTPATIENT
Start: 2021-01-28 | End: 2021-01-28

## 2021-01-28 RX ORDER — TIZANIDINE 4 MG/1
4 TABLET ORAL EVERY 6 HOURS PRN
Status: DISCONTINUED | OUTPATIENT
Start: 2021-01-28 | End: 2021-01-31 | Stop reason: HOSPADM

## 2021-01-28 RX ORDER — ONDANSETRON 2 MG/ML
4 INJECTION INTRAMUSCULAR; INTRAVENOUS ONCE
Status: COMPLETED | OUTPATIENT
Start: 2021-01-28 | End: 2021-01-28

## 2021-01-28 RX ORDER — ONDANSETRON 2 MG/ML
INJECTION INTRAMUSCULAR; INTRAVENOUS
Status: DISPENSED
Start: 2021-01-28 | End: 2021-01-29

## 2021-01-28 RX ORDER — HYDROCODONE BITARTRATE AND ACETAMINOPHEN 5; 325 MG/1; MG/1
1 TABLET ORAL EVERY 6 HOURS PRN
Status: DISCONTINUED | OUTPATIENT
Start: 2021-01-28 | End: 2021-01-30

## 2021-01-28 RX ORDER — PROMETHAZINE HYDROCHLORIDE 12.5 MG/1
12.5 TABLET ORAL EVERY 6 HOURS PRN
Status: DISCONTINUED | OUTPATIENT
Start: 2021-01-28 | End: 2021-01-29 | Stop reason: ALTCHOICE

## 2021-01-28 RX ORDER — LEVETIRACETAM 500 MG/1
1500 TABLET ORAL 2 TIMES DAILY
Status: DISCONTINUED | OUTPATIENT
Start: 2021-01-28 | End: 2021-01-31 | Stop reason: HOSPADM

## 2021-01-28 RX ORDER — MORPHINE SULFATE 4 MG/ML
2 INJECTION, SOLUTION INTRAMUSCULAR; INTRAVENOUS ONCE
Status: COMPLETED | OUTPATIENT
Start: 2021-01-28 | End: 2021-01-28

## 2021-01-28 RX ORDER — PANTOPRAZOLE SODIUM 40 MG/1
40 TABLET, DELAYED RELEASE ORAL 2 TIMES DAILY
Status: DISCONTINUED | OUTPATIENT
Start: 2021-01-29 | End: 2021-01-31 | Stop reason: HOSPADM

## 2021-01-28 RX ADMIN — ONDANSETRON 4 MG: 2 INJECTION INTRAMUSCULAR; INTRAVENOUS at 22:07

## 2021-01-28 RX ADMIN — MORPHINE SULFATE 2 MG: 4 INJECTION INTRAVENOUS at 21:14

## 2021-01-28 RX ADMIN — DIAZEPAM 5 MG: 5 TABLET ORAL at 22:32

## 2021-01-28 RX ADMIN — ONDANSETRON 4 MG: 2 INJECTION INTRAMUSCULAR; INTRAVENOUS at 21:15

## 2021-01-28 RX ADMIN — Medication 1 MG: at 22:02

## 2021-01-28 RX ADMIN — ONDANSETRON 4 MG: 2 INJECTION INTRAMUSCULAR; INTRAVENOUS at 20:59

## 2021-01-28 RX ADMIN — IOPAMIDOL 75 ML: 755 INJECTION, SOLUTION INTRAVENOUS at 22:01

## 2021-01-28 RX ADMIN — MORPHINE SULFATE 4 MG: 4 INJECTION INTRAVENOUS at 20:59

## 2021-01-28 RX ADMIN — HYDROMORPHONE HYDROCHLORIDE 1 MG: 1 INJECTION, SOLUTION INTRAMUSCULAR; INTRAVENOUS; SUBCUTANEOUS at 22:02

## 2021-01-28 ASSESSMENT — ENCOUNTER SYMPTOMS
COLOR CHANGE: 0
WHEEZING: 0
CONSTIPATION: 0
COUGH: 0
VOMITING: 0
DIARRHEA: 0
NAUSEA: 0
ABDOMINAL PAIN: 0
BACK PAIN: 1
SHORTNESS OF BREATH: 0
CHEST TIGHTNESS: 0

## 2021-01-28 ASSESSMENT — PAIN DESCRIPTION - LOCATION: LOCATION: BACK

## 2021-01-28 ASSESSMENT — PAIN DESCRIPTION - ORIENTATION: ORIENTATION: LEFT;RIGHT

## 2021-01-28 ASSESSMENT — PAIN DESCRIPTION - PAIN TYPE: TYPE: CHRONIC PAIN

## 2021-01-28 ASSESSMENT — PAIN SCALES - GENERAL: PAINLEVEL_OUTOF10: 10

## 2021-01-29 LAB
ALBUMIN SERPL-MCNC: 4.1 G/DL (ref 3.5–5.2)
ALBUMIN/GLOBULIN RATIO: 1.2 (ref 1–2.5)
ALP BLD-CCNC: 78 U/L (ref 35–104)
ALT SERPL-CCNC: 8 U/L (ref 5–33)
AST SERPL-CCNC: 12 U/L
BILIRUB SERPL-MCNC: 0.26 MG/DL (ref 0.3–1.2)
BILIRUBIN DIRECT: <0.08 MG/DL
BILIRUBIN, INDIRECT: ABNORMAL MG/DL (ref 0–1)
GLOBULIN: ABNORMAL G/DL (ref 1.5–3.8)
GLUCOSE BLD-MCNC: 118 MG/DL (ref 65–105)
GLUCOSE BLD-MCNC: 127 MG/DL (ref 65–105)
GLUCOSE BLD-MCNC: 90 MG/DL (ref 65–105)
GLUCOSE BLD-MCNC: 99 MG/DL (ref 65–105)
TOTAL PROTEIN: 7.6 G/DL (ref 6.4–8.3)

## 2021-01-29 PROCEDURE — 2580000003 HC RX 258: Performed by: STUDENT IN AN ORGANIZED HEALTH CARE EDUCATION/TRAINING PROGRAM

## 2021-01-29 PROCEDURE — 6360000002 HC RX W HCPCS: Performed by: STUDENT IN AN ORGANIZED HEALTH CARE EDUCATION/TRAINING PROGRAM

## 2021-01-29 PROCEDURE — 99254 IP/OBS CNSLTJ NEW/EST MOD 60: CPT | Performed by: INTERNAL MEDICINE

## 2021-01-29 PROCEDURE — 6370000000 HC RX 637 (ALT 250 FOR IP): Performed by: STUDENT IN AN ORGANIZED HEALTH CARE EDUCATION/TRAINING PROGRAM

## 2021-01-29 PROCEDURE — 97535 SELF CARE MNGMENT TRAINING: CPT | Performed by: OCCUPATIONAL THERAPIST

## 2021-01-29 PROCEDURE — 99255 IP/OBS CONSLTJ NEW/EST HI 80: CPT | Performed by: INTERNAL MEDICINE

## 2021-01-29 PROCEDURE — 99223 1ST HOSP IP/OBS HIGH 75: CPT | Performed by: INTERNAL MEDICINE

## 2021-01-29 PROCEDURE — 80076 HEPATIC FUNCTION PANEL: CPT

## 2021-01-29 PROCEDURE — 1200000000 HC SEMI PRIVATE

## 2021-01-29 PROCEDURE — 36415 COLL VENOUS BLD VENIPUNCTURE: CPT

## 2021-01-29 PROCEDURE — 6370000000 HC RX 637 (ALT 250 FOR IP): Performed by: INTERNAL MEDICINE

## 2021-01-29 PROCEDURE — 82947 ASSAY GLUCOSE BLOOD QUANT: CPT

## 2021-01-29 PROCEDURE — 97165 OT EVAL LOW COMPLEX 30 MIN: CPT | Performed by: OCCUPATIONAL THERAPIST

## 2021-01-29 RX ORDER — FENTANYL CITRATE 50 UG/ML
25 INJECTION, SOLUTION INTRAMUSCULAR; INTRAVENOUS
Status: DISCONTINUED | OUTPATIENT
Start: 2021-01-29 | End: 2021-01-31 | Stop reason: HOSPADM

## 2021-01-29 RX ORDER — DEXTROSE MONOHYDRATE 50 MG/ML
100 INJECTION, SOLUTION INTRAVENOUS PRN
Status: DISCONTINUED | OUTPATIENT
Start: 2021-01-29 | End: 2021-01-31 | Stop reason: HOSPADM

## 2021-01-29 RX ORDER — MORPHINE SULFATE 30 MG/1
30 TABLET, FILM COATED, EXTENDED RELEASE ORAL 2 TIMES DAILY
Status: DISCONTINUED | OUTPATIENT
Start: 2021-01-29 | End: 2021-01-30

## 2021-01-29 RX ORDER — NICOTINE POLACRILEX 4 MG
15 LOZENGE BUCCAL PRN
Status: DISCONTINUED | OUTPATIENT
Start: 2021-01-29 | End: 2021-01-31 | Stop reason: HOSPADM

## 2021-01-29 RX ORDER — AMOXICILLIN AND CLAVULANATE POTASSIUM 875; 125 MG/1; MG/1
1 TABLET, FILM COATED ORAL EVERY 12 HOURS SCHEDULED
Status: DISCONTINUED | OUTPATIENT
Start: 2021-01-29 | End: 2021-01-31 | Stop reason: HOSPADM

## 2021-01-29 RX ORDER — DEXTROSE MONOHYDRATE 25 G/50ML
12.5 INJECTION, SOLUTION INTRAVENOUS PRN
Status: DISCONTINUED | OUTPATIENT
Start: 2021-01-29 | End: 2021-01-31 | Stop reason: HOSPADM

## 2021-01-29 RX ORDER — PROMETHAZINE HYDROCHLORIDE 25 MG/ML
25 INJECTION, SOLUTION INTRAMUSCULAR; INTRAVENOUS EVERY 6 HOURS PRN
Status: DISCONTINUED | OUTPATIENT
Start: 2021-01-29 | End: 2021-01-31 | Stop reason: HOSPADM

## 2021-01-29 RX ORDER — OXYCODONE HYDROCHLORIDE AND ACETAMINOPHEN 5; 325 MG/1; MG/1
1 TABLET ORAL EVERY 6 HOURS PRN
Status: DISCONTINUED | OUTPATIENT
Start: 2021-01-29 | End: 2021-01-30

## 2021-01-29 RX ORDER — METOCLOPRAMIDE HYDROCHLORIDE 5 MG/ML
10 INJECTION INTRAMUSCULAR; INTRAVENOUS EVERY 6 HOURS PRN
Status: DISCONTINUED | OUTPATIENT
Start: 2021-01-29 | End: 2021-01-31 | Stop reason: HOSPADM

## 2021-01-29 RX ADMIN — MORPHINE SULFATE 4 MG: 4 INJECTION INTRAVENOUS at 06:36

## 2021-01-29 RX ADMIN — APIXABAN 10 MG: 5 TABLET, FILM COATED ORAL at 22:33

## 2021-01-29 RX ADMIN — ONDANSETRON 4 MG: 2 INJECTION INTRAMUSCULAR; INTRAVENOUS at 13:19

## 2021-01-29 RX ADMIN — ASPIRIN 81 MG: 81 TABLET, CHEWABLE ORAL at 20:54

## 2021-01-29 RX ADMIN — MORPHINE SULFATE 30 MG: 30 TABLET, EXTENDED RELEASE ORAL at 20:54

## 2021-01-29 RX ADMIN — SODIUM CHLORIDE, PRESERVATIVE FREE 10 ML: 5 INJECTION INTRAVENOUS at 19:55

## 2021-01-29 RX ADMIN — PANTOPRAZOLE SODIUM 40 MG: 40 TABLET, DELAYED RELEASE ORAL at 20:55

## 2021-01-29 RX ADMIN — OXYCODONE HYDROCHLORIDE AND ACETAMINOPHEN 1 TABLET: 5; 325 TABLET ORAL at 18:14

## 2021-01-29 RX ADMIN — LISINOPRIL 2.5 MG: 2.5 TABLET ORAL at 09:38

## 2021-01-29 RX ADMIN — LEVETIRACETAM 1500 MG: 500 TABLET, FILM COATED ORAL at 02:25

## 2021-01-29 RX ADMIN — METOPROLOL SUCCINATE 25 MG: 25 TABLET, FILM COATED, EXTENDED RELEASE ORAL at 09:39

## 2021-01-29 RX ADMIN — TIZANIDINE 4 MG: 4 TABLET ORAL at 22:02

## 2021-01-29 RX ADMIN — FENTANYL CITRATE 25 MCG: 50 INJECTION, SOLUTION INTRAMUSCULAR; INTRAVENOUS at 21:55

## 2021-01-29 RX ADMIN — ONDANSETRON 4 MG: 2 INJECTION INTRAMUSCULAR; INTRAVENOUS at 22:03

## 2021-01-29 RX ADMIN — HYDROMORPHONE HYDROCHLORIDE 0.5 MG: 1 INJECTION, SOLUTION INTRAMUSCULAR; INTRAVENOUS; SUBCUTANEOUS at 04:53

## 2021-01-29 RX ADMIN — APIXABAN 5 MG: 5 TABLET, FILM COATED ORAL at 10:05

## 2021-01-29 RX ADMIN — APIXABAN 5 MG: 5 TABLET, FILM COATED ORAL at 02:25

## 2021-01-29 RX ADMIN — Medication 10 ML: at 21:56

## 2021-01-29 RX ADMIN — ONDANSETRON 4 MG: 2 INJECTION INTRAMUSCULAR; INTRAVENOUS at 00:01

## 2021-01-29 RX ADMIN — FENTANYL CITRATE 25 MCG: 50 INJECTION, SOLUTION INTRAMUSCULAR; INTRAVENOUS at 13:20

## 2021-01-29 RX ADMIN — ONDANSETRON 4 MG: 2 INJECTION INTRAMUSCULAR; INTRAVENOUS at 06:36

## 2021-01-29 RX ADMIN — AMOXICILLIN AND CLAVULANATE POTASSIUM 1 TABLET: 875; 125 TABLET, FILM COATED ORAL at 21:55

## 2021-01-29 RX ADMIN — FENTANYL CITRATE 25 MCG: 50 INJECTION, SOLUTION INTRAMUSCULAR; INTRAVENOUS at 11:04

## 2021-01-29 RX ADMIN — AMOXICILLIN AND CLAVULANATE POTASSIUM 1 TABLET: 875; 125 TABLET, FILM COATED ORAL at 09:38

## 2021-01-29 RX ADMIN — TIZANIDINE 4 MG: 4 TABLET ORAL at 09:38

## 2021-01-29 RX ADMIN — TIZANIDINE 4 MG: 4 TABLET ORAL at 17:12

## 2021-01-29 RX ADMIN — HYDROMORPHONE HYDROCHLORIDE 0.5 MG: 1 INJECTION, SOLUTION INTRAMUSCULAR; INTRAVENOUS; SUBCUTANEOUS at 17:12

## 2021-01-29 RX ADMIN — FENTANYL CITRATE 25 MCG: 50 INJECTION, SOLUTION INTRAMUSCULAR; INTRAVENOUS at 19:55

## 2021-01-29 RX ADMIN — MORPHINE SULFATE 4 MG: 4 INJECTION INTRAVENOUS at 03:29

## 2021-01-29 RX ADMIN — MORPHINE SULFATE 30 MG: 30 TABLET, EXTENDED RELEASE ORAL at 09:38

## 2021-01-29 RX ADMIN — FENTANYL CITRATE 25 MCG: 50 INJECTION, SOLUTION INTRAMUSCULAR; INTRAVENOUS at 15:40

## 2021-01-29 RX ADMIN — HYDROMORPHONE HYDROCHLORIDE 0.5 MG: 1 INJECTION, SOLUTION INTRAMUSCULAR; INTRAVENOUS; SUBCUTANEOUS at 00:01

## 2021-01-29 RX ADMIN — PANTOPRAZOLE SODIUM 40 MG: 40 TABLET, DELAYED RELEASE ORAL at 09:38

## 2021-01-29 RX ADMIN — SERTRALINE 50 MG: 50 TABLET, FILM COATED ORAL at 09:38

## 2021-01-29 RX ADMIN — LEVETIRACETAM 1500 MG: 500 TABLET, FILM COATED ORAL at 20:54

## 2021-01-29 RX ADMIN — METOPROLOL SUCCINATE 25 MG: 25 TABLET, FILM COATED, EXTENDED RELEASE ORAL at 02:25

## 2021-01-29 RX ADMIN — METOCLOPRAMIDE 10 MG: 5 INJECTION, SOLUTION INTRAMUSCULAR; INTRAVENOUS at 00:29

## 2021-01-29 RX ADMIN — LEVETIRACETAM 1500 MG: 500 TABLET, FILM COATED ORAL at 10:05

## 2021-01-29 ASSESSMENT — PAIN SCALES - GENERAL
PAINLEVEL_OUTOF10: 7
PAINLEVEL_OUTOF10: 9
PAINLEVEL_OUTOF10: 9
PAINLEVEL_OUTOF10: 8
PAINLEVEL_OUTOF10: 9
PAINLEVEL_OUTOF10: 10

## 2021-01-29 ASSESSMENT — ENCOUNTER SYMPTOMS
RHINORRHEA: 0
SHORTNESS OF BREATH: 0
TROUBLE SWALLOWING: 0
CONSTIPATION: 0
VOMITING: 1
BACK PAIN: 1
NAUSEA: 1
COLOR CHANGE: 0
STRIDOR: 0
WHEEZING: 0
COUGH: 0
EYE REDNESS: 0

## 2021-01-29 ASSESSMENT — PAIN DESCRIPTION - PAIN TYPE
TYPE: CHRONIC PAIN
TYPE: CHRONIC PAIN

## 2021-01-29 ASSESSMENT — PAIN DESCRIPTION - LOCATION
LOCATION: BACK
LOCATION: ABDOMEN;BACK
LOCATION: ABDOMEN;BACK

## 2021-01-29 NOTE — ED NOTES
Pt able to stand with steady gait to bedside toilet. Pt returned to bed and placed back on the monitor. Pt reports that she feels a little drowsy after the dilaudid. Will continue to monitor.       Radha Veloz RN  01/28/21 3631

## 2021-01-29 NOTE — ED NOTES
Pt able to stand to bedside toilet with steady gait and no assistance. Pt back in bed and placed back on the monitor.       Samantha Powell RN  01/29/21 0988

## 2021-01-29 NOTE — ED PROVIDER NOTES
[facility]     Emergency Department     Faculty Note/ Attestation      Pt Name: Diana Escalona                                       MRN: [id number]  Álvarogfdebra 1965  Date of evaluation: 1/28/2021    Patients PCP:    No primary care provider on file. Attestation  I performed a history and physical examination of the patient and discussed management with the resident. I reviewed the residents note and agree with the documented findings and plan of care. Any areas of disagreement are noted on the chart. I was personally present for the key portions of any procedures. I have documented in the chart those procedures where I was not present during the key portions. I have reviewed the emergency nurses triage note. I agree with the chief complaint, past medical history, past surgical history, allergies, medications, social and family history as documented unless otherwise noted below. For Physician Assistant/ Nurse Practitioner cases/documentation I have personally evaluated this patient and have completed at least one if not all key elements of the E/M (history, physical exam, and MDM). Additional findings are as noted.       Initial Screens:             Vitals:    Vitals:    01/28/21 2017 01/28/21 2026   BP:  (!) 178/76   Pulse:  78   Resp:  16   Temp: 97 °F (36.1 °C)    TempSrc: Oral    SpO2:  99%   Weight:  161 lb (73 kg)   Height:  5' 5\" (1.651 m)       CHIEF COMPLAINT       Chief Complaint   Patient presents with    Back Pain    Nausea             DIAGNOSTIC RESULTS             RADIOLOGY:   CT ABDOMEN PELVIS W IV CONTRAST Additional Contrast? None    (Results Pending)   CT LUMBAR SPINE TRAUMA RECONSTRUCTION    (Results Pending)         LABS:  Labs Reviewed   CBC WITH AUTO DIFFERENTIAL   LACTIC ACID, PLASMA   BASIC METABOLIC PANEL W/ REFLEX TO MG FOR LOW K   C-REACTIVE PROTEIN   SEDIMENTATION RATE         EMERGENCY DEPARTMENT COURSE:     ------------------------- BP: (!) 178/76, Temp: 97 °F (36.1 °C), Pulse: 78, Resp: 16      Comments    Ovarian CA with mets  Recent splenic abscess  Sacral pain with nausea    CT shows improvement in splenic fluid collection, she does have bone and lung mets, no obvious abdominal or pelvic emergency. Admitted for symptom management.       (Please note that portions of this note were completed with a voice recognition program.  Efforts were made to edit the dictations but occasionally words are mis-transcribed.)      Hopper MD  Attending Emergency Physician         Macario Mcdonough MD  01/28/21 8752

## 2021-01-29 NOTE — CARE COORDINATION
Notified Saad at [facility] that pt has been admitted.  She stated to call with an estimated discharge date so they can plan for a date to resume care

## 2021-01-29 NOTE — CONSULTS
[facility] - Initial Consult Note  Today's Date and Time: 1/29/2021, 11:25 AM    Chief complaint/reason for consultation:   · Splenic abscess with pig tail catheter drainage   · Recent admission requiring IV Meropenem     Impression :   · Splenic abscess smaller- pig tail drainage - augmentin  · Pleural effusion  · Metastatic ovarian carcinoma   · Large retroperiton LN  · Osteolytic lumbar spine lesions  · Status Epilepticus  · Blood clot   · Type 2 Diabetes Mellitus     Recommendations:   · Continue Augmentin for splenic abscess  · Monitor LFTs  · Follow up on blood cultures  · Oncology on board  · Will follow     Infection Control Recommendations   · [place] Precautions    Antimicrobial Stewardship Recommendations   · Simplification of therapy  · Targeted therapy  · IV to oral conversion    Coordination of Outpatient Care:   · Estimated Length of IV antimicrobials:  · Patient will need Midline Catheter Insertion:   · Patient will need PICC line Insertion:    History of Present Illness:   Oly Diamond is a 60y.o.-year-old with a past medical history of metastatic ovarian carcinoma, splenic abscess, pleural effusion, and type 2 diabetes mellitus presented with neck pain of acute onset. She was most recently discharged from the hospital on 1/14/2021 after being admitted due to status epilepticus most likely secondary to her metastatic ovarian carcinoma. During that admission she developed pleural effusion secondary to the splenic abscess and was treated with IV meropenem. Due to the expenses she was discharged on augmentin as she could not afford the meropenem. She also complained of back pain radiating to her thigh in the ED. Ct of lumbar spine as well as CT of abdomen were performed and they were positive for metastatic lesions to the lumbar spine and pelvis with a decrease in size of the splenic abscess.       Labs, X rays reviewed: 1/29/2021  WBC: 13.4 Hb: 10.6  Plat: 628  BUN: 6  Cr: 0.33  CRP 6.5   ESR 70    Cultures:  Blood:  · Blood culture from 1/28 no growth 8 hours x 2     Imaging:  CT A+P with IV contrast from 1/28  1. Decrease in size of the splenic fluid collection/abscess. Pigtail catheter remains in place. 2. Slight decrease in size of the left pleural effusion with persistent moderate pericardial effusion. 3. Multiple abdominal and pelvic lymph nodes are again noted, some are increased in size while others have decreased. 4. Osseous and pulmonary metastases are again noted. 5. Cholelithiasis without CT evidence of cholecystitis. I have personally reviewed the past medical history, past surgical history, medications, social history, and family history, and I have updated the database accordingly. Past Medical History:     Past Medical History:   Diagnosis Date    Anemia     Bleeding 10/2020    intra-abdominal bleeding -due to splenic mass with GI infiltration. Status post embolization    [facility] [facility]     Depression     Diabetes mellitus ([facility] [facility]     GERD (gastroesophageal reflux disease)     Metastatic cancer ([facility] [address] 10/2020    extensive intraabdominal and splenic involvement and lung mets.     Ovarian cancer ([facility] [address]     low grade serous ovarian carcinoma    Post chemo evaluation     2007: Chemo via med onc (Dr. Sonali Everett), 2008: Shayy Rene due to rising CA-125, 2013: intraperitoneal chemo,12/2015: Ca125 - 25     Splenic lesion      Past Surgical  History:     Past Surgical History:   Procedure Laterality Date    ABSCESS DRAINAGE  2013    Franca rectal    ANUS SURGERY      ANAL FISSURECTOMY    COLECTOMY  03/2013    ex lap, tumor debulking, transverse colectomy w reanastamosis, subgastric omentectomy, intraperitoneal port placement    HYSTERECTOMY, TOTAL ABDOMINAL      IR EMBOLIZATION HEMORRHAGE  10/05/2020 intra-abdominal bleeding -due to splenic mass with GI infiltration. Status post embolization [employer] interlock coils x7. mri condtional 3t ok, safe immediately post implant.     IR PORT PLACEMENT EQUAL OR GREATER THAN 5 YEARS  8/24/2020    IR PORT PLACEMENT EQUAL OR GREATER THAN 5 YEARS 8/24/2020 Jerica Koenig MD [facility]    PORT SURGERY      IP Port    TONSILLECTOMY       Medications:      amoxicillin-clavulanate  1 tablet Oral 2 times per day    insulin lispro  0-18 Units Subcutaneous TID WC    insulin lispro  0-9 Units Subcutaneous Nightly    morphine  30 mg Oral BID    sodium chloride flush  10 mL Intravenous 2 times per day    apixaban  5 mg Oral BID    aspirin  81 mg Oral Nightly    levETIRAcetam  1,500 mg Oral BID    lisinopril  2.5 mg Oral Daily    metoprolol succinate  25 mg Oral Daily    pantoprazole  40 mg Oral BID    sertraline  50 mg Oral Daily    sodium chloride flush  10 mL Intravenous 2 times per day     Social History:     Social History     Socioeconomic History    Marital status: Single     Spouse name: Not on file    Number of children: Not on file    Years of education: Not on file    Highest education level: Not on file   Occupational History    Not on file   Social Needs    Financial resource strain: Not on file    Food insecurity     Worry: Not on file     Inability: Not on file   [employer] needs     Medical: Not on file     Non-medical: Not on file   Tobacco Use    Smoking status: Former Smoker     Packs/day: 1.00    Smokeless tobacco: Current User   Substance and Sexual Activity    Alcohol use: Not Currently    Drug use: Never    Sexual activity: Not on file   Lifestyle    Physical activity     Days per week: Not on file     Minutes per session: Not on file    Stress: Not on file   Relationships    Social connections     Talks on phone: Not on file     Gets together: Not on file     Attends [employer] service: Not on file Active member of club or organization: Not on file     Attends meetings of clubs or organizations: Not on file     Relationship status: Not on file    Intimate partner violence     Fear of current or ex partner: Not on file     Emotionally abused: Not on file     Physically abused: Not on file     Forced sexual activity: Not on file   Other Topics Concern    Not on file   Social History Narrative    Not on file     Family History:     Family History   Problem Relation Age of Onset    Alcohol Abuse Mother     Cirrhosis Mother       Allergies:   Ceftriaxone     Review of Systems:   Review of Systems   Constitutional: Negative for chills, fatigue and fever. HENT: Negative for congestion, rhinorrhea and trouble swallowing. Eyes: Negative for redness. Respiratory: Negative for cough, shortness of breath, wheezing and stridor. Cardiovascular: Negative for chest pain, palpitations and leg swelling. Gastrointestinal: Positive for nausea and vomiting. Negative for constipation. Endocrine: Negative for polyuria. Genitourinary: Positive for flank pain. Negative for dysuria and urgency. Musculoskeletal: Positive for back pain and neck pain. Negative for arthralgias and myalgias. Skin: Negative for color change. Allergic/Immunologic: Positive for immunocompromised state. Neurological: Negative for dizziness and light-headedness. Hematological: Negative for adenopathy. Psychiatric/Behavioral: Negative for confusion. The patient is not nervous/anxious.       Physical Examination :     Patient Vitals for the past 8 hrs:   BP Temp Temp src Pulse Resp SpO2 Weight   01/29/21 0926 122/62 98.1 °F (36.7 °C) Oral 67 16 95 %    01/29/21 0415 (!) 150/80 97 °F (36.1 °C) Oral 80 18 98 % 162 lb 7.7 oz (73.7 kg)   01/29/21 0334    82 17 97 %    01/29/21 0331 (!) 167/80   76  96 %      Physical Exam Constitutional: She is oriented to person, place, and time. She appears well-developed and well-nourished. No distress. HENT:   Head: Normocephalic and atraumatic. Eyes: Conjunctivae are normal. No scleral icterus. Neck: Neck supple. No tracheal deviation present. Cardiovascular: Exam reveals no gallop and no friction rub. No murmur heard. Pulmonary/Chest: Effort normal and breath sounds normal. No respiratory distress. She has no wheezes. She has no rales. She exhibits no tenderness. Abdominal: Soft. Bowel sounds are normal. She exhibits no distension and no mass. There is no abdominal tenderness. There is no rebound and no guarding. Genitourinary:    Genitourinary Comments: No wright     Musculoskeletal:         General: No tenderness or edema. Neurological: She is oriented to person, place, and time. No cranial nerve deficit. Skin: Skin is dry. She is not diaphoretic. No erythema. Psychiatric: She has a normal mood and affect.  Her behavior is normal. Judgment and thought content normal.      Medical Decision Making -Laboratory:   I have independently reviewed/ordered the following labs:    CBC with Differential:   Recent Labs     01/26/21  1601 01/28/21 2058   WBC 10.3 13.4*   HGB 12.0 10.6*   HCT 41.4 36.0*   * 628*   LYMPHOPCT 24 18*   MONOPCT 7 6     BMP:   Recent Labs     01/26/21  1601 01/28/21 2058   NA  --  139   K  --  3.8   CL  --  103   CO2  --  25   BUN  --  6   CREATININE 0.43* 0.33*     Hepatic Function Panel:   Recent Labs     01/26/21  1601   PROT 7.8   LABALBU 4.1   BILIDIR <0.08   IBILI CANNOT BE CALCULATED   BILITOT 0.18*   ALKPHOS 82   ALT 14   AST 21     Lab Results   Component Value Date    MUCUS NOT REPORTED 01/05/2021    RBC 4.30 01/28/2021    TRICHOMONAS NOT REPORTED 01/05/2021    WBC 13.4 01/28/2021    YEAST NOT REPORTED 01/05/2021    TURBIDITY CLOUDY 01/05/2021     Lab Results   Component Value Date    CREATININE 0.33 01/28/2021    GLUCOSE 130 01/28/2021 Note:  · Labs, medications, radiologic studies were reviewed with personal review of films  · Moderate Large amounts of data were reviewed  · Discussed with nursing Staff, Discharge planner  · Infection Control and Prevention measures reviewed  · All prior entries were reviewed  · Administer medications as ordered  · Prognosis: Good  · Discharge planning reviewed  · Follow up as outpatient. Thank you for allowing us to participate in the care of this patient. Please call with questions. Morro Sandra MD  PGY-1, Internal Medicine Resident  [facility]  1/29/2021 11:26 AM        I have discussed the care of the patient, including pertinent history and exam findings,  with the resident. I have seen and examined the patient and the key elements of all parts of the encounter have been performed by me. I agree with the assessment, plan and orders as documented by the resident.     Anushka Ho, Infectious Diseases

## 2021-01-29 NOTE — ED NOTES
Pt was requesting to take her Keppra at this time. I informed the pt that I was trying to get her nausea under control before I gave her oral meds. Pt understands but would like to try and take her meds at this time. Pt's meds taken with small sips of water. No active vomiting at this time.       Patrick Dougherty RN  01/29/21 0361

## 2021-01-29 NOTE — PROGRESS NOTES
She came into the ER today for evaluation of back pain which is radiating to her thigh. initial imaging ct lumbar and ct abdomen showed metastatic lesions in lumbar spine and pelvis, decreased in size of splenic fluid collection.     She has been admitted under internal medicine service for metastatic ovarian carcinoma with mild pleural effusion on the left lower lung. OBJECTIVE     Vital Signs:  BP (!) 150/80   Pulse 80   Temp 97 °F (36.1 °C) (Oral)   Resp 18   Ht 5' 5\" (1.651 m)   Wt 162 lb 7.7 oz (73.7 kg)   SpO2 98%   BMI 27.04 kg/m²     Temp (24hrs), Av °F (36.1 °C), Min:97 °F (36.1 °C), Max:97 °F (36.1 °C)    No intake/output data recorded. Physical Exam:    Constitutional: This is a well developed, well nourished, 25-29.9 - Overweight 60y.o. year old female who is alert, oriented, cooperative and in no apparent distress. Head:normocephalic and atraumatic. EENT:  PERRLA. No conjunctival injections. Septum was midline, mucosa was without erythema, exudates or cobblestoning. No thrush was noted. Neck: Supple without thyromegaly. No elevated JVP. Trachea was midline. Respiratory: Chest was symmetrical without dullness to percussion. Breath sounds bilaterally were clear to auscultation. There were no wheezes, rhonchi or rales. There is no intercostal retraction or use of accessory muscles. No egophony noted. Cardiovascular: Regular without murmur, clicks, gallops or rubs. Abdomen: Slightly rounded and soft without organomegaly. No rebound, rigidity or guarding was appreciated. Lymphatic: No lymphadenopathy. Musculoskeletal: Tenderness over the lumbar spine. Normal curvature of the spine. No gross muscle weakness. Extremities:  No lower extremity edema, ulcerations, tenderness, varicosities or erythema. Muscle size, tone and strength are normal.  No involuntary movements are noted. Skin:  Warm and dry. Good color, turgor and pigmentation. No lesions or scars. No cyanosis or clubbing  Neurological/Psychiatric: The patient's general behavior, level of consciousness, thought content and emotional status is normal.        Medications:  Scheduled Medications:    amoxicillin-clavulanate  1 tablet Oral 2 times per day    sodium chloride flush  10 mL Intravenous 2 times per day    apixaban  5 mg Oral BID    aspirin  81 mg Oral Nightly    levETIRAcetam  1,500 mg Oral BID    lisinopril  2.5 mg Oral Daily    metFORMIN  500 mg Oral BID WC    metoprolol succinate  25 mg Oral Daily    pantoprazole  40 mg Oral BID    sertraline  50 mg Oral Daily    sodium chloride flush  10 mL Intravenous 2 times per day     Continuous Infusions:   PRN Medications    metoclopramide, 10 mg, Q6H PRN      promethazine, 25 mg, Q6H PRN      sodium chloride flush, 10 mL, PRN      HYDROcodone-acetaminophen, 1 tablet, Q6H PRN      tiZANidine, 4 mg, Q6H PRN      zolpidem, 5 mg, Nightly PRN      sodium chloride flush, 10 mL, PRN      ondansetron, 4 mg, Q6H PRN      polyethylene glycol, 17 g, Daily PRN      acetaminophen, 650 mg, Q6H PRN    Or      acetaminophen, 650 mg, Q6H PRN      morphine, 4 mg, Q4H PRN        Diagnostic Labs:  CBC:   Recent Labs     01/26/21  1601 01/28/21 2058   WBC 10.3 13.4*   RBC 5.06 4.30   HGB 12.0 10.6*   HCT 41.4 36.0*   MCV 81.8* 83.7   RDW 25.3* 24.2*   * 628*     BMP:   Recent Labs     01/26/21  1601 01/28/21 2058   NA  --  139   K  --  3.8   CL  --  103   CO2  --  25   BUN  --  6   CREATININE 0.43* 0.33*     BNP: No results for input(s): BNP in the last 72 hours. PT/INR: No results for input(s): PROTIME, INR in the last 72 hours. APTT: No results for input(s): APTT in the last 72 hours. CARDIAC ENZYMES: No results for input(s): CKMB, CKMBINDEX, TROPONINI in the last 72 hours.     Invalid input(s): CKTOTAL;3  FASTING LIPID PANEL:No results found for: CHOL, HDL, TRIG LIVER PROFILE:   Recent Labs     01/26/21  1601   AST 21   ALT 14   BILIDIR <0.08   BILITOT 0.18*   ALKPHOS 82      MICROBIOLOGY:   Lab Results   Component Value Date/Time    CULTURE NO GROWTH 6 DAYS 01/13/2021 09:07 AM       Imaging:    Ct Abdomen Pelvis W Iv Contrast Additional Contrast? None    Result Date: 1/28/2021  1. Decrease in size of the splenic fluid collection/abscess. Pigtail catheter remains in place. 2. Slight decrease in size of the left pleural effusion with persistent moderate pericardial effusion. 3. Multiple abdominal and pelvic lymph nodes are again noted, some are increased in size while others have decreased. 4. Osseous and pulmonary metastases are again noted. 5. Cholelithiasis without CT evidence of cholecystitis. Ct Lumbar Spine Trauma Reconstruction    Result Date: 1/28/2021  Sclerotic osseous metastatic disease within the lumbar spine and pelvis. No pathologic fracture identified. ASSESSMENT & PLAN     ASSESSMENT / PLAN:     Principal Problem:    Cancer, metastatic to bone [facility])  Active Problems:    Malignant neoplasm of ovary (HCC)    Seizure (HCC)    Anemia    Splenic abscess    Ovarian cancer (HCC)    Pleural effusion    Essential hypertension  Resolved Problems:    * No resolved hospital problems.  *      Metastatic ovarian carcinoma : Patient due for follow-up with with heme-onc, will consult heme-onc.follow up on their input.     History of blood clot likely secondary to underlying malignancy continue on Eliquis 5 mg twice daily.     History of seizures: Continue on plan 1500 mg twice daily     Essential hypertension: Continue on lisinopril 2.5 mg,  metoprolol 25 mg     Osteolytic lesions on lumbar spine : Symptomatic management of pain with morphine and fentanyl     Splenic abscess: Pigtail catheter drainage in place, patient afebrile, will consult ID for starting IV meropenem, follow-up blood cultures    Pleural effusion: Mild effusions noted on left lower lobe, monitor for now     Diabetes mellitus type II borderline: A1c 6 continue on Metformin 500 mg twice daily, control blood glucose       DVT ppx: On Eliquis  GI ppx: Not indicated     PT/OT/SW: Consulted  Discharge Planning: Ongoing     Esteban Torres MD  Internal Medicine Resident, PGY- [facility];  [place], [place]  1/29/2021, 4:57 AM

## 2021-01-29 NOTE — CONSULTS
Today's Date: 1/29/2021  Patient Name: Brandy Renee  Date of admission: 1/28/2021  8:18 PM  Patient's age: 60 y.o., 1965  Admission Dx: Cancer, metastatic to bone [facility]) [C79.51]    Reason for Consult: management recommendations  Requesting Physician: Valarie Sexton MD    CHIEF COMPLAINT: Back pain. Abdominal pain. Metastatic ovarian cancer    History Obtained From:  patient, electronic medical record    HISTORY OF PRESENT ILLNESS:      The patient is a 60 y.o.  female who is admitted to the hospital for chief complaints of back pain and abdominal pain. Patient presented to the ER with chief complaints of back pain patient was recently discharged from the hospital on 21 January. She was admitted at that point with status epilepticus. Patient was also treated for splenic abscess for which she was treated with IV meropenem with pigtail catheter. Patient was discharged on Augmentin. Work-up done for evaluation of pulmonary pain and back pain showed metastatic lesion in the lumbar spine and decreased size of the splenic fluid. Patient has history of recurrent ovarian cancer originally diagnosed in the year 2005 with multiple recurrences. Patient has been treated with Doxil Avastin. Avastin was discontinued due to intra-abdominal bleeding. Patient also has history of seizure disorder. Work-up did not reveal any brain metastasis. Patient also recently had heart catheterization which did not show any critical stenosis. Patient was thought to be a candidate for anthracycline. Patient last chemotherapy was somewhere during October 2020. Past Medical History:   has a past medical history of Anemia, Bleeding, Cervical cancer ([address] Depression, Diabetes mellitus ([address] GERD (gastroesophageal reflux disease), Metastatic cancer ([address] Ovarian cancer ([address] Post chemo evaluation, and Splenic lesion. Past Surgical History:   has a past surgical history that includes Hysterectomy, total abdominal; Port Surgery; Tonsillectomy; IR PORT PLACEMENT > 5 YEARS (8/24/2020); Anus surgery; Abscess Drainage (2013); colectomy (03/2013); and IR EMBOLIZATION HEMORRHAGE (10/05/2020). Medications:    Prior to Admission medications    Medication Sig Start Date End Date Taking? Authorizing Provider   oxyCODONE-acetaminophen (PERCOCET) 5-325 MG per tablet TAKE 1 TO 2 TABLETS BY MOUTH EVERY 4 HOURS FOR 7 DAYS AS NEEDED . DO NOT EXCEED 8 PER 24 HOURS (IF NEEDED FOR BREAKTHROUGH PAIN) 12/24/20  Yes Historical Provider, MD   apixaban (ELIQUIS) 5 MG TABS tablet Take 10 mg by mouth 2 times daily 10 mg 2x daily for first 7 days then reduce to 5 mg 2x daily   Yes Historical Provider, MD   metFORMIN (GLUCOPHAGE-XR) 500 MG extended release tablet Take 2 tablets by mouth twice daily 1/25/21  Yes Terry Ganser, MD   zolpidem (AMBIEN) 5 MG tablet Take 1 tablet by mouth nightly as needed for Sleep for up to 30 days. 1/15/21 2/14/21 Yes Terry Ganser, MD   HYDROcodone-acetaminophen (NORCO) 5-325 MG per tablet Take 1 tablet by mouth every 6 hours as needed for Pain for up to 30 days.  1/15/21 2/14/21 Yes Terry Ganser, MD   amoxicillin-clavulanate (AUGMENTIN) 875-125 MG per tablet Take 1 tablet by mouth every 12 hours pls do the blood test weekly - if the urine becomes dark or the right upper abd start being sore, pls call DR MARTÍNEZ 1/14/21 2/13/21 Yes Cullen Lee MD   lisinopril (PRINIVIL;ZESTRIL) 2.5 MG tablet Take 1 tablet by mouth daily 1/14/21  Yes Shayne Salazar MD   metoprolol succinate (TOPROL XL) 25 MG extended release tablet Take 1 tablet by mouth daily 1/14/21  Yes Shayne Salazar MD   levETIRAcetam (KEPPRA) 750 MG tablet Take 2 tablets by mouth 2 times daily 10/28/20  Yes Jadyn Gambino MD   prochlorperazine (COMPAZINE) 10 MG tablet Take 10 mg by mouth every 6 hours as needed   Yes Historical Provider, MD tiZANidine (ZANAFLEX) 4 MG tablet Take 4 mg by mouth every 6 hours as needed   Yes Historical Provider, MD   ibuprofen (ADVIL;MOTRIN) 200 MG tablet Take 200 mg by mouth every 6 hours as needed for Pain   Yes Historical Provider, MD   pantoprazole (PROTONIX) 40 MG tablet Take 40 mg by mouth 2 times daily   Yes Historical Provider, MD   aspirin 81 MG chewable tablet Take 81 mg by mouth nightly   Yes Historical Provider, MD   sertraline (ZOLOFT) 100 MG tablet Take 50 mg by mouth daily  8/8/20  Yes Historical Provider, MD     Current Facility-Administered Medications   Medication Dose Route Frequency Provider Last Rate Last Admin    amoxicillin-clavulanate (AUGMENTIN) 875-125 MG per tablet 1 tablet  1 tablet Oral 2 times per day Ramond Phoenix, MD   1 tablet at 01/29/21 0938    metoclopramide (REGLAN) injection 10 mg  10 mg Intravenous Q6H PRN Issa Hernández MD   10 mg at 01/29/21 0029    promethazine (PHENERGAN) injection 25 mg  25 mg Intravenous Q6H PRN Issa Hernández MD        insulin lispro (HUMALOG) injection vial 0-18 Units  0-18 Units Subcutaneous TID WC Kemar Smith MD        insulin lispro (HUMALOG) injection vial 0-9 Units  0-9 Units Subcutaneous Nightly Kemar Smith MD        glucose (GLUTOSE) 40 % oral gel 15 g  15 g Oral PRN Kemar Smith MD        dextrose 50 % IV solution  12.5 g Intravenous PRN Kemar Smith MD        glucagon (rDNA) injection 1 mg  1 mg Intramuscular PRN Kemar Smith MD        dextrose 5 % solution  100 mL/hr Intravenous PRN Kemar Smith MD        morphine (MS CONTIN) extended release tablet 30 mg  30 mg Oral BID Madeleine Mata MD   30 mg at 01/29/21 0938    fentaNYL (SUBLIMAZE) injection 25 mcg  25 mcg Intravenous Q2H PRN Madeleine Mata MD   25 mcg at 01/29/21 1540    HYDROmorphone (DILAUDID) injection 0.5 mg  0.5 mg Intravenous Once Madeleine Mata MD  sodium chloride flush 0.9 % injection 10 mL  10 mL Intravenous 2 times per day Celestina Suresh DO        sodium chloride flush 0.9 % injection 10 mL  10 mL Intravenous PRN Celestina Suresh DO        apixaban (ELIQUIS) tablet 5 mg  5 mg Oral BID Christiano Alan MD   5 mg at 01/29/21 1005    aspirin chewable tablet 81 mg  81 mg Oral Nightly Christiano Alan MD        HYDROcodone-acetaminophen (NORCO) 5-325 MG per tablet 1 tablet  1 tablet Oral Q6H PRN Christiano Alan MD        levETIRAcetam (KEPPRA) tablet 1,500 mg  1,500 mg Oral BID Christiano Alan MD   1,500 mg at 01/29/21 1005    lisinopril (PRINIVIL;ZESTRIL) tablet 2.5 mg  2.5 mg Oral Daily Christiano Alan MD   2.5 mg at 01/29/21 5778    metoprolol succinate (TOPROL XL) extended release tablet 25 mg  25 mg Oral Daily Christiano Alan MD   25 mg at 01/29/21 0939    pantoprazole (PROTONIX) tablet 40 mg  40 mg Oral BID Christiano Alan MD   40 mg at 01/29/21 0938    sertraline (ZOLOFT) tablet 50 mg  50 mg Oral Daily Christiano Alan MD   50 mg at 01/29/21 0938    tiZANidine (ZANAFLEX) tablet 4 mg  4 mg Oral Q6H PRN Christiano Alan MD   4 mg at 01/29/21 8922    zolpidem (AMBIEN) tablet 5 mg  5 mg Oral Nightly PRN Christiano Alan MD        sodium chloride flush 0.9 % injection 10 mL  10 mL Intravenous 2 times per day Christiano Alan MD        sodium chloride flush 0.9 % injection 10 mL  10 mL Intravenous PRN Christiano Alan MD        ondansetron (ZOFRAN) injection 4 mg  4 mg Intravenous Q6H PRN Federico Howell MD   4 mg at 01/29/21 1319    polyethylene glycol (GLYCOLAX) packet 17 g  17 g Oral Daily PRN Christiano Alan MD        acetaminophen (TYLENOL) tablet 650 mg  650 mg Oral Q6H PRN Christiano Alan MD        Or    acetaminophen (TYLENOL) suppository 650 mg  650 mg Rectal Q6H PRN Christiano Alan MD           Allergies:  Ceftriaxone Social History:   reports that she has quit smoking. She smoked 1.00 pack per day. She uses smokeless tobacco. She reports previous alcohol use. She reports that she does not use drugs. Family History: family history includes Alcohol Abuse in her mother; Cirrhosis in her mother. REVIEW OF SYSTEMS:      Constitutional: No fever or chills. No night sweats, today of weight loss   Eyes: No eye discharge, double vision, or eye pain   HEENT: negative for sore mouth, sore throat, hoarseness and voice change   Respiratory: negative for cough , sputum, dyspnea, wheezing, hemoptysis, chest pain   Cardiovascular: negative for chest pain, dyspnea, palpitations, orthopnea, PND   Gastrointestinal: Positive abdominal pain  Genitourinary: negative for frequency, dysuria, nocturia, urinary incontinence, and hematuria   Integument: negative for rash, skin lesions, bruises. Hematologic/Lymphatic: negative for easy bruising, bleeding, lymphadenopathy, or petechiae   Endocrine: negative for heat or cold intolerance,weight changes, change in bowel habits and hair loss   Musculoskeletal: Back pain.   Neurological: negative for headaches, dizziness, seizures, weakness, numbness    PHYSICAL EXAM:        /62   Pulse 67   Temp 98.1 °F (36.7 °C) (Oral)   Resp 16   Ht 5' 5\" (1.651 m)   Wt 162 lb 7.7 oz (73.7 kg)   SpO2 95%   BMI 27.04 kg/m²    Temp (24hrs), Av.4 °F (36.3 °C), Min:97 °F (36.1 °C), Max:98.1 °F (36.7 °C)      General appearance - well appearing, no in pain or distress   Mental status - alert and cooperative   Eyes - pupils equal and reactive, extraocular eye movements intact   Ears - bilateral TM's and external ear canals normal   Mouth - mucous membranes moist, pharynx normal without lesions   Neck - supple, no significant adenopathy   Lymphatics - no palpable lymphadenopathy, no hepatosplenomegaly   Chest - clear to auscultation, no wheezes, rales or rhonchi, symmetric air entry Heart - normal rate, regular rhythm, normal S1, S2, no murmurs  Abdomen - soft, nontender, nondistended, no masses or organomegaly   Neurological - alert, oriented, normal speech, no focal findings or movement disorder noted   Musculoskeletal - no joint tenderness, deformity or swelling   Extremities - peripheral pulses normal, no pedal edema, no clubbing or cyanosis   Skin - normal coloration and turgor, no rashes, no suspicious skin lesions noted ,      DATA:      Labs:     Results for orders placed or performed during the hospital encounter of 01/28/21   Culture, Blood 1    Specimen: Blood   Result Value Ref Range    Specimen Description . BLOOD     Special Requests RT FA 15ML     Culture NO GROWTH 11 HOURS    Culture, Blood 1    Specimen: Blood   Result Value Ref Range    Specimen Description . BLOOD     Special Requests RT HAND 15ML     Culture NO GROWTH 11 HOURS    CBC Auto Differential   Result Value Ref Range    WBC 13.4 (H) 3.5 - 11.3 k/uL    RBC 4.30 3.95 - 5.11 m/uL    Hemoglobin 10.6 (L) 11.9 - 15.1 g/dL    Hematocrit 36.0 (L) 36.3 - 47.1 %    MCV 83.7 82.6 - 102.9 fL    MCH 24.7 (L) 25.2 - 33.5 pg    MCHC 29.4 28.4 - 34.8 g/dL    RDW 24.2 (H) 11.8 - 14.4 %    Platelets 531 (H) 727 - 453 k/uL    MPV 9.6 8.1 - 13.5 fL    NRBC Automated 0.0 0.0 per 100 WBC    Differential Type NOT REPORTED     WBC Morphology NOT REPORTED     RBC Morphology NOT REPORTED     Platelet Estimate NOT REPORTED     Immature Granulocytes 0 0 %    Seg Neutrophils 72 (H) 36 - 65 %    Lymphocytes 18 (L) 24 - 43 %    Monocytes 6 3 - 12 %    Eosinophils % 3 1 - 4 %    Basophils 1 0 - 2 %    Absolute Immature Granulocyte 0.00 0.00 - 0.30 k/uL    Segs Absolute 9.66 (H) 1.50 - 8.10 k/uL    Absolute Lymph # 2.41 1.10 - 3.70 k/uL    Absolute Mono # 0.80 0.10 - 1.20 k/uL    Absolute Eos # 0.40 0.00 - 0.44 k/uL    Basophils Absolute 0.13 0.00 - 0.20 k/uL    Morphology ANISOCYTOSIS PRESENT    Lactic Acid, Plasma   Result Value Ref Range Lactic Acid NOT REPORTED mmol/L    Lactic Acid, Whole Blood 1.5 0.7 - 2.1 mmol/L   Basic Metabolic Panel w/ Reflex to MG   Result Value Ref Range    Glucose 130 (H) 70 - 99 mg/dL    BUN 6 6 - 20 mg/dL    CREATININE 0.33 (L) 0.50 - 0.90 mg/dL    Bun/Cre Ratio NOT REPORTED 9 - 20    Calcium 9.2 8.6 - 10.4 mg/dL    Sodium 139 135 - 144 mmol/L    Potassium 3.8 3.7 - 5.3 mmol/L    Chloride 103 98 - 107 mmol/L    CO2 25 20 - 31 mmol/L    Anion Gap 11 9 - 17 mmol/L    GFR Non-African American >60 >60 mL/min    GFR African American >60 >60 mL/min    GFR Comment          GFR Staging NOT REPORTED    C-Reactive Protein   Result Value Ref Range    CRP 6.5 (H) 0.0 - 5.0 mg/L   Sedimentation Rate   Result Value Ref Range    Sed Rate 70 (H) 0 - 30 mm   HEPATIC FUNCTION PANEL   Result Value Ref Range    Albumin 4.1 3.5 - 5.2 g/dL    Alkaline Phosphatase 78 35 - 104 U/L    ALT 8 5 - 33 U/L    AST 12 <32 U/L    Total Bilirubin 0.26 (L) 0.3 - 1.2 mg/dL    Bilirubin, Direct <0.08 <0.31 mg/dL    Bilirubin, Indirect CANNOT BE CALCULATED 0.00 - 1.00 mg/dL    Total Protein 7.6 6.4 - 8.3 g/dL    Globulin NOT REPORTED 1.5 - 3.8 g/dL    Albumin/Globulin Ratio 1.2 1.0 - 2.5   POC Glucose Fingerstick   Result Value Ref Range    POC Glucose 127 (H) 65 - 105 mg/dL   POC Glucose Fingerstick   Result Value Ref Range    POC Glucose 90 65 - 105 mg/dL   POC Glucose Fingerstick   Result Value Ref Range    POC Glucose 118 (H) 65 - 105 mg/dL         IMAGING DATA:    Echo Complete 2d W Doppler W Color    Result Date: 1/6/2021 Transthoracic Echocardiography Report (TTE)  Patient Name Aranza Nazario      Date of Study               01/06/2021               [address] [facility] RUEDA   Date of      1965  Gender                      Female  Birth   Age          60 year(s)  Race                           Room Number  0522        Height:                     66 inch, 167.64 cm   Corporate ID [id number]    Weight:                     165 pounds, 74.8 kg  #   Patient Acct [de-identified]   BSA:          1.84 m^2      BMI:      26.63  #                                                              kg/m^2   MR #         [id number]     Sonographer                 Ju Fulton   Accession #  [id number]  Interpreting Physician      [address] [place] [address]   Fellow                   Referring Nurse                           Practitioner   Interpreting             Referring Physician         Marcie Harding  Type of Study   TTE procedure:2D Echocardiogram, M-Mode, Doppler, Color Doppler. Procedure Date Date: 01/06/2021 Start: 10:20 AM Study Location: [facility] Technical Quality: Good visualization Indications:Seizure. History / Tech. Comments: Procedure explained to patient. Study done bedside in Neuro ICU. Evaluate cardiac function Patient Status: Inpatient Height: 66 inches Weight: 165 pounds BSA: 1.84 m^2 BMI: 26.63 kg/m^2 CONCLUSIONS Summary Left ventricle is normal in size. Global left ventricular systolic function is mild to moderately reduced. Estimated ejection fraction is 40 % . The apex and apical segments appear severely hypokinetic. The basal segments appear to be functioning normal. Abnormal global L. strain of -11 %. Mild left ventricular hypertrophy. Grade I (mild) left ventricular diastolic dysfunction. Mild aortic insufficiency. Trivial mitral regurgitation. Trivial tricuspid regurgitation. Small to mild circumferential pericardial effusion, with no echocardiographic tamponade.  Signature ----------------------------------------------------------------------------  Electronically signed by Ju Fulton(Sonographer) on 01/06/2021  05:34 PM ---------------------------------------------------------------------------- ----------------------------------------------------------------------------  Electronically signed by Greg Underwood(Interpreting physician) on 01/06/2021  07:01 PM ---------------------------------------------------------------------------- FINDINGS Left Atrium Left atrium is normal in size. Left Ventricle Left ventricle is normal in size. Global left ventricular systolic function is mild to moderately reduced. Estimated ejection fraction is 40 % . Abnormal global L. strain of -11 %. Mild left ventricular hypertrophy. The apex and apical segments appear severely hypokinetic. The basal segments appear to be functioning normal. Grade I (mild) left ventricular diastolic dysfunction. Right Atrium Right atrium is normal in size. Right Ventricle Normal right ventricular size and function. Mitral Valve Normal mitral valve structure. Trivial mitral regurgitation. No mitral stenosis. Aortic Valve Aortic valve structure and function normal. Aortic valve is trileaflet. Mild aortic insufficiency. No aortic stenosis. Tricuspid Valve Normal tricuspid valve leaflets. Trivial tricuspid regurgitation. No tricuspid stenosis. Insignificant tricuspid regurgitation, unable to estimate RVSP. Pulmonic Valve Pulmonic valve is normal in structure and function. No pulmonic insufficiency. No evidence of pulmonic stenosis. Pericardial Effusion Small to mild circumferential pericardial effusion, with no echocardiographic tamponade. Miscellaneous Normal aortic root dimension. The ascending aorta is normal in size. E/E' average = 16.95. IVC normal diameter & inspiratory collapse indicating normal RA filling pressure .  M-mode / 2D Measurements & Calculations:   LVIDd:4.9 cm(3.7 - 5.6 cm)       Diastolic WUMJMF:06.51 ml LVIDs: 3.2 cm(2.2 - 4.0 cm)       Systolic ZODZWD:39.65 ml  IVSd:1.1 cm(0.6 - 1.1 cm)        Aortic Root:2.3 cm(2.0 - 3.7 cm)  LVPWd:1.1 cm(0.6 - 1.1 cm)       LA Dimension: 3.8 cm(1.9 - 4.0 cm)  Fractional Shortenin.69 %    LA volume/Index: 51.8 ml /28m^2  Calculated LVEF (%): 40.44 %     LVOT:1.9 cm                                   RVDd:2.7 cm   Mitral:                                 Aortic   Valve Area (P1/2-Time): 4.07 cm^2       Peak Velocity: 1.44 m/s  Peak E-Wave: 0.86 m/s                   Mean Velocity: 1.02 m/s  Peak A-Wave: 1.11 m/s                   Peak Gradient: 8.29 mmHg  E/A Ratio: 0.77                         Mean Gradient: 6 mmHg  Peak Gradient: 2.94 mmHg  Mean Gradient: 2 mmHg  Deceleration Time: 169 msec             Area (continuity): 1.96 cm^2  P1/2t: 54 msec                          AV VTI: 32 cm   Area (continuity): 2.26 cm^2  Mean Velocity: 0.68 m/s                                           Pulmonic:                                           Peak Velocity: 0.88 m/s                                          Peak Gradient: 3.11 mmHg  Diastology / Tissue Doppler Septal Wall E' velocity:0.05 m/s Septal Wall E/E':16.8 Lateral Wall E' velocity:0.05 m/s Lateral Wall E/E':17.1    Ct Head Wo Contrast    Result Date: 2021 EXAMINATION: CT OF THE HEAD WITHOUT CONTRAST  1/4/2021 9:59 am TECHNIQUE: CT of the head was performed without the administration of intravenous contrast. Dose modulation, iterative reconstruction, and/or weight based adjustment of the mA/kV was utilized to reduce the radiation dose to as low as reasonably achievable. COMPARISON: 08/20/2020, 08/04/2020 HISTORY: ORDERING SYSTEM PROVIDED HISTORY: seizure TECHNOLOGIST PROVIDED HISTORY: seizure Reason for Exam: Seizure, LOC. Pt is intubated. Hx of ovarian CA. Acuity: Acute Type of Exam: Initial Relevant Medical/Surgical History: ovarian CA, DM FINDINGS: BRAIN/VENTRICLES: There is no acute intracranial hemorrhage, mass effect or midline shift. No abnormal extra-axial fluid collection. The gray-white differentiation is maintained without evidence of an acute infarct. Mild scattered regions of low attenuation within the periventricular, subcortical, and deep white matter, presumably sequelae of chronic microangiopathic ischemic white matter change, though ultimately age indeterminate in the absence of prior comparison. Mild volume loss with commensurate ventricular and sulcal prominence. There is no evidence of hydrocephalus. ORBITS: The visualized portion of the orbits demonstrate no acute abnormality. SINUSES: There are air-fluid levels with aerated secretions within the sphenoid sinuses and ethmoid air cells on a background of mild mucosal thickening. Small retention cysts are present within the partially imaged maxillary sinuses. Mastoid air cells are clear. SOFT TISSUES/SKULL:  Heterogeneous mineralization of the calvarium, clivus, and partially imaged upper cervical spine. No fracture. Superficial scalp soft tissues are without focal abnormality. Endotracheal and enteric tubes traverse the nasopharynx and oropharynx, only partially imaged. EXAMINATION: CT OF THE ABDOMEN AND PELVIS WITH CONTRAST 1/28/2021 9:09 pm TECHNIQUE: CT of the abdomen and pelvis was performed with the administration of intravenous contrast. Multiplanar reformatted images are provided for review. Dose modulation, iterative reconstruction, and/or weight based adjustment of the mA/kV was utilized to reduce the radiation dose to as low as reasonably achievable. COMPARISON: 01/05/2021 HISTORY: ORDERING SYSTEM PROVIDED HISTORY: hx splenic abscess, n/v TECHNOLOGIST PROVIDED HISTORY: hx splenic abscess, n/v Reason for Exam: BACK PAIN, NAUSEA, HX OVARIAN CANCER Acuity: Acute Type of Exam: Initial FINDINGS: Lower Chest: The heart is enlarged with moderate pericardial effusion. Coronary artery atherosclerosis. Partially calcified soft tissue nodules within the right lower chest are unchanged. Small left pleural effusion with adjacent consolidation. The effusion is slightly decreased in the interval. There are innumerable pulmonary nodules. Organs: No acute abnormality within the liver. Cholelithiasis without pericholecystic fluid. Pigtail catheter is again noted within the spleen with decrease in size of the splenic fluid collection/abscess. Dystrophic calcifications within the spleen are again noted. Evaluation of the pancreas is limited due to streak artifact. No acute abnormality of the adrenal glands or kidneys. GI/Bowel: Small hiatal hernia. The stomach is partially distended. The small bowel is nondilated. The colon is nondilated with evidence of prior partial colonic resection. The appendix is normal in caliber. Pelvis: The bladder is partially distended without vesicular stone. Prior hysterectomy. Peritoneum/Retroperitoneum: No ascites or pneumoperitoneum. Atherosclerosis of the nondilated abdominal aorta. Left retroperitoneal lymph node has decreased in size in the interval, now measuring 2.8 x 2.3 cm (previously 3.1 x 2.6 cm).   The right retroperitoneal lymph node measures slightly larger at 4.7 x 1.8 cm (previously 3.6 x 1.6 cm). The midline pelvic lymph node is increased in size, now measuring 1.2 cm. The right pelvic soft tissue lesion is stable to minimally increased in size at 4.0 x 2.4 cm left iliac node is increased in size, now measuring 4.3 x 2.0 cm (previously 3.2 x 1.7 cm). Percutaneous catheter terminating in the left pelvis is unchanged. Bones/Soft Tissues: Sclerotic osseous lesions are consistent with metastases. 1. Decrease in size of the splenic fluid collection/abscess. Pigtail catheter remains in place. 2. Slight decrease in size of the left pleural effusion with persistent moderate pericardial effusion. 3. Multiple abdominal and pelvic lymph nodes are again noted, some are increased in size while others have decreased. 4. Osseous and pulmonary metastases are again noted. 5. Cholelithiasis without CT evidence of cholecystitis. Xr Chest Portable    Result Date: 1/13/2021  EXAMINATION: ONE X-RAY VIEW OF THE CHEST 1/13/2021 8:21 am COMPARISON: 01/07/2020 HISTORY: ORDERING SYSTEM PROVIDED HISTORY:  Follow up after thoracocenthesis TECHNOLOGIST PROVIDED HISTORY: Follow up after thoracocenthesis Reason for Exam: Uprt port chest FINDINGS: No convincing evidence of pneumothorax status post left thoracentesis. Right jugular chest port remains in place. Stable heart size with mild perihilar vascular prominence and indistinctness of pulmonary vessels suggesting mild perihilar edema, overall improved since the prior study. Faintly visualized drainage tube in the left upper quadrant of the abdomen. Small lung nodules are better shown on the recent CT. There is right hilar prominence/adenopathy. Mild left basilar opacity due to residual fluid and atelectasis. No acute osseous abnormality within limits of the exam.     No evidence of pneumothorax status post left thoracentesis.      Xr Chest Portable    Result Date: 1/7/2021 EXAMINATION: ONE XRAY VIEW OF THE CHEST 1/7/2021 6:31 am COMPARISON: January 6, 2021, chest examination HISTORY: ORDERING SYSTEM PROVIDED HISTORY: Intubated, eval for acute findings TECHNOLOGIST PROVIDED HISTORY: Intubated, eval for acute findings FINDINGS: Interval removal of endotracheal, and nasogastric tubes Stable right IJ port catheter/cardiac silhouette Expiratory exam with interval progression in now moderate diffuse prominence/indistinctness of the pulmonary vascularity/interstitial markings. Stable moderate left basilar opacity slight interval increase in mild streaky right basilar density     Support lines, as detailed Interval progression in now moderate vascular congestion Stable moderate left basilar opacity related to combined pleural-parenchymal process with slight progression in mild streaky right basilar atelectasis     Xr Chest Portable    Result Date: 1/6/2021  EXAMINATION: ONE XRAY VIEW OF THE CHEST 1/6/2021 6:28 am COMPARISON: 01/05/2021 HISTORY: ORDERING SYSTEM PROVIDED HISTORY: Intubated, eval for acute findings TECHNOLOGIST PROVIDED HISTORY: Intubated, eval for acute findings Reason for Exam: supine port Acuity: Unknown Type of Exam: Ongoing FINDINGS: Endotracheal tube terminates 4.7 cm above the alejandro. Enteric tube courses below left hemidiaphragm. Cardiac silhouette is mildly enlarged. Left basilar chest tube remains in essentially unchanged positioning. Stable appearing left-sided pleural effusion versus pleural/parenchymal scarring. Right basilar atelectasis/scarring. Vascular port terminates overlying the expected location of the SVC. Overall stable examination. Cardiomegaly and pulmonary vascular congestion. Small left pleural effusion versus pleural scarring.      Xr Chest Portable    Result Date: 1/5/2021 EXAMINATION: ONE XRAY VIEW OF THE CHEST 1/5/2021 6:11 am COMPARISON: January 4, 2021, chest exam HISTORY: ORDERING SYSTEM PROVIDED HISTORY: Intubated, eval for acute findings TECHNOLOGIST PROVIDED HISTORY: Intubated, eval for acute findings Reason for Exam: supine port Acuity: Unknown Type of Exam: Ongoing FINDINGS: Stable right IJ port catheter, endotracheal, nasogastric tubes and left basilar pigtail catheter without definite pneumothorax Stable cardiac silhouette Persistent mild streaky right basilar atelectasis and moderate left perihilar/left basilar opacity with possible mild vascular congestion     Stable chest Stable support lines Possible mild edema with moderate left perihilar, left basilar opacity related to combined pleuroparenchymal process     Xr Chest Portable    Result Date: 1/4/2021  EXAMINATION: ONE XRAY VIEW OF THE CHEST 1/4/2021 5:02 pm COMPARISON: January 4, 2021 HISTORY: ORDERING SYSTEM PROVIDED HISTORY: evaluate ETT position TECHNOLOGIST PROVIDED HISTORY: evaluate ETT position Reason for Exam: ett position FINDINGS: ET and enteric tubes unchanged. ET tube appears to terminate 42 mm above the alejandro. Right IJ MediPort unchanged. Small left effusion. Mild edema. Pigtail catheter left lung base. Metallic coils left upper quadrant of the abdomen. Heart and mediastinum normal.  Bony thorax intact. Subsegmental atelectasis right base. ET tube as above. Life support apparatus otherwise stable. Mild-to-moderate edema and small left effusion unchanged.      Xr Chest Portable    Result Date: 1/4/2021 EXAMINATION: ONE XRAY VIEW OF THE CHEST 1/4/2021 11:38 am COMPARISON: 10/24/2020 HISTORY: ORDERING SYSTEM PROVIDED HISTORY: post intubation TECHNOLOGIST PROVIDED HISTORY: post intubation Reason for Exam: LOC. NG tube & ET tube placed Acuity: Acute Type of Exam: Initial FINDINGS: Endotracheal tube is in place with its tip terminating approximately 4.4 cm above the alejandro. Enteric tube courses below the left hemidiaphragm. A left basilar pigtail chest tube catheter noted in place. There is a residual small to moderate-sized left-sided effusion with adjacent airspace disease. Right hemithorax is clear. Vascular port terminates overlying the expected location of the SVC. No convincing evidence for pneumothorax. Vascular coiling identified within the upper abdomen. Small to moderate left pleural effusion with adjacent airspace disease. Left-sided chest tube terminates overlying the left lung base. Endotracheal and enteric tubes as above. Us Thoracentesis Which Side Should The Procedure Be Performed?  Left    Result Date: 1/13/2021 PROCEDURE: ULTRASOUNDGUIDED left THORACENTESIS 1/13/2021 HISTORY: Left pleural effusion TECHNIQUE: This procedure was performed by Jenna Vela PA-C under indirect supervision of Dr. Clifford Olivera. While the patient was seated upright, after localizing, marking and anesthetizing the posterior left lower chest with one percent lidocaine, a 5 [employer] Linda Yueh needle was advanced under ultrasound guidance. Some sonogram image demonstrate safe tract access. The catheter was advanced and the needle was removed. The catheter was connected to a Vacutainer bottle and 500 mL of clear yellow fluidwas drained. Postprocedural ultrasound demonstrated minimal residual fluid. The catheter was removed and the site was dressed appropriately. A sample was sent for laboratory analysis. Estimated blood loss was minimum. The patient tolerated the procedure well and left the Department in stable condition. FINDINGS: A total of 500 mL of clear yellow fluid was removed. Successful ultrasound guided thoracentesis. Xr Abdomen For Ng/og/ne Tube Placement    Result Date: 1/4/2021  EXAMINATION: ONE SUPINE XRAY VIEW(S) OF THE ABDOMEN 1/4/2021 10:28 pm COMPARISON: None. HISTORY: ORDERING SYSTEM PROVIDED HISTORY: Confirmation of course of NG/OG/NE tube and location of tip of tube TECHNOLOGIST PROVIDED HISTORY: Confirmation of course of NG/OG/NE tube and location of tip of tube Portable? ->Yes Reason for Exam: supine FINDINGS: Enteric tube in the stomach. Tubing left lower quadrant of the abdomen requires clinical correlation. 38 x 44 mm a radiodensity right lower quadrant. Nonspecific bowel gas pattern. Osseous structures normal.     Enteric tube in the stomach. Other incidental findings as above.      Ct Chest Abdomen Pelvis W Contrast    Result Date: 1/5/2021 EXAMINATION: CT OF THE CHEST, ABDOMEN, AND PELVIS WITH CONTRAST 1/5/2021 3:42 pm TECHNIQUE: CT of the chest, abdomen and pelvis was performed with the administration of intravenous contrast. Multiplanar reformatted images are provided for review. Dose modulation, iterative reconstruction, and/or weight based adjustment of the mA/kV was utilized to reduce the radiation dose to as low as reasonably achievable. COMPARISON: PET-CT August 20, 2020 HISTORY: ORDERING SYSTEM PROVIDED HISTORY: concern for worsening splenic abscess TECHNOLOGIST PROVIDED HISTORY: concern for worsening splenic abscess Reason for Exam: concern for worsening splenic abscess Acuity: Unknown Type of Exam: Unknown FINDINGS: Chest: Mediastinum: ET and enteric tubes noted in appropriate position. Right internal jugular catheter terminates in the right atrium. Small pericardial effusion. Calcific coronary artery disease. Heart and great vessels otherwise unremarkable. Prominent right hilar lymphadenopathy noted measuring up to 17 mm. Mediastinal lymphadenopathy measuring up to 9 x 14 mm in the prevascular space. Lungs/pleura: Jonna Nutley Moderate left effusion and left lower lobe airspace disease. 5 mm pulmonary nodule left upper lobe image number 41 3 mm pulmonary nodule left upper lobe image number 43 another 3 mm nodule noted on image 46. At least 3 pulmonary nodules noted on image 57 and 58 measuring up to 5 mm. 3 mm nodule noted near the fissure right lower lobe image 41.  3 mm nodule right upper lobe image number 27 2 mm nodule right upper lobe image number 49 3 by-4 mm nodule right middle lobe image number 57 5 mm nodule right middle lobe image 63. Multiple pulmonary nodules noted at the right lung base measuring up to 4 to 5 mm. Calcified mass anterior sulcus on the right appears stable measuring 14 x 26 mm. Soft Tissues/Bones: T11 and T12 vertebral body sclerotic lesions.  Abdomen/Pelvis: Organs: Percutaneous catheter left anterior abdominal wall terminates in the pelvis. The liver,, pancreas, and adrenals appear normal.  There appears to be a left upper quadrant pigtail catheter which is new. Splenic complex cystic mass possibly with coarse calcifications is difficult to assess due to beam hardening artifact from local metallic coils. Oral contrast is noted in the stomach and small bowel. Large gallstone. Kidneys appear normal. Decompressed with a Mercado catheter. GI/Bowel: The stomach,small bowel, and colon appear normal. There is increased stool throughout the colon. Oral contrast is noted in the small bowel and colon and stomach. Pelvis: There appear to be multiple new mesenteric nodules in the pelvis measuring up to 24 x 38 mm prominent left inguinal lymphadenopathy is noted. Left pelvic sidewall lymphadenopathy appears unchanged. Confluency masses measure up to 17 x 32 mm. Peritoneum/Retroperitoneum: Prominent heterogeneous confluency retroperitoneal lymphadenopathy measures up to 26 x 31 mm on the left and 16 x 36 mm on the right. Bones/Soft Tissues: Sclerotic lesions noted L1, L5 and right ischium which appear more conspicuous. The largest measures 8 mm in the right ischium. Interval placement of a percutaneous pigtail catheter left splenic complex mass. Mesenteric, retroperitoneal, and left inguinal pathologic lymphadenopathy consistent with metastatic disease. Multiple pulmonary nodules consistent with metastatic disease. Prominent right hilar lymph node. New left lower lobe consolidation and effusion. Diffuse bony blastic metastatic disease.      Mri Brain W Wo Contrast    Result Date: 1/5/2021 EXAMINATION: MRI OF THE BRAIN WITHOUT AND WITH CONTRAST  1/5/2021 11:23 am TECHNIQUE: Multiplanar multisequence MRI of the head/brain was performed without and with the administration of intravenous contrast. COMPARISON: Head CT 01/04/2021 HISTORY: ORDERING SYSTEM PROVIDED HISTORY: seizure, history of metastatic cancer TECHNOLOGIST PROVIDED HISTORY: seizure, history of metastatic cancer Reason for Exam: seizure, hx of mets FINDINGS: INTRACRANIAL STRUCTURES/VENTRICLES: Motion degraded examination. No acute infarction. No mass effect or midline shift. No acute intracranial hemorrhage. Mild parenchymal volume loss with enlargement of the ventricles and cerebral sulci. Minimal periventricular and subcortical white matter T2/FLAIR hyperintense signal.  The sellar/suprasellar regions appear unremarkable. The normal signal voids within the major intracranial vessels appear maintained. No abnormal focus of enhancement is seen within the brain. Coronal T2 and FLAIR sequences demonstrate symmetric signal intensity and volume in the hippocampi bilaterally. ORBITS: The visualized portion of the orbits demonstrate no acute abnormality. SINUSES: Mucosal thickening and fluid in the paranasal sinuses. Fluid in the mastoid air cells. BONES/SOFT TISSUES: The bone marrow signal intensity appears normal. The soft tissues demonstrate no acute abnormality. 1. No acute intracranial abnormality. Specifically, no acute infarction, mesial temporal sclerosis, or intracranial metastatic disease. 2. Mild parenchymal volume loss. Sequela of mild chronic microvascular ischemic changes.      Ct Lumbar Spine Trauma Reconstruction    Result Date: 1/28/2021 EXAMINATION: CT OF THE LUMBAR SPINE WITHOUT CONTRAST  1/28/2021 TECHNIQUE: CT of the lumbar spine was performed without the administration of intravenous contrast. Multiplanar reformatted images are provided for review. Dose modulation, iterative reconstruction, and/or weight based adjustment of the mA/kV was utilized to reduce the radiation dose to as low as reasonably achievable. COMPARISON: None HISTORY: ORDERING SYSTEM PROVIDED HISTORY: METASTASES TECHNOLOGIST PROVIDED HISTORY: metastatic ovarian cancer, concern for lumbosacral fx Reason for Exam: BACK PAIN, R/O METS FINDINGS: BONES/ALIGNMENT: There are 5 non-rib-bearing, lumbar type vertebral bodies. There is sacralization of L5. There are sclerotic lesions within the T11, T12, L1, L2, and L4 vertebral bodies. There is also a sclerotic lesion in the right iliac bone. Vertebral body heights are maintained. No displaced fracture. No significant listhesis. DEGENERATIVE CHANGES: There is mild intervertebral disc height loss at L4-5. SOFT TISSUES/RETROPERITONEUM: Soft tissue findings are dictated separately. Sclerotic osseous metastatic disease within the lumbar spine and pelvis. No pathologic fracture identified. IMPRESSION:   Primary Problem  Cancer, metastatic to bone [facility])    Active Hospital Problems    Diagnosis Date Noted    Cancer, metastatic to bone [facility]) [C79.51] 01/28/2021    Essential hypertension [I10] 01/28/2021    Pleural effusion [J90] 01/13/2021    Anemia [D64.9]     Splenic abscess [D73.3]     Ovarian cancer ([facility] [address] [C56.9]     Seizure ([address] [R56.9] 10/21/2020    Malignant neoplasm of ovary ([address] [C56.9] 08/17/2020           RECOMMENDATIONS:  1. I personally reviewed results of lab work-up imaging studies and other relevant clinical data. 2. Reviewed treatment record.   Reviewed records from previous hospitalization

## 2021-01-29 NOTE — ED PROVIDER NOTES
[facility]  ED  Emergency Department Encounter  Emergency Medicine Resident     Pt Name: Rolando Chase  MRN: [id number]  [place] 1965  Date of evaluation: 1/28/21  PCP:  No primary care provider on file. CHIEF COMPLAINT       Chief Complaint   Patient presents with    Back Pain    Nausea       HISTORY OFPRESENT ILLNESS  (Location/Symptom, Timing/Onset, Context/Setting, Quality, Duration, Modifying Factors,Severity.)      Rolando Chase is a 32 y.o. female who presents with lower back pain and nausea. Patient has a history of back pain in the similar area, however states is much worse and was not improved with her Norco and Percocet at home. Patient has metastatic ovarian cancer with bony metastasis. Per daughter, there may be mets in this area. Patient did have recent admission for a splenic abscess, he currently has a drain in and is on oral Augmentin at home. Patient denies any other complaints other than this acute on chronic low back pain with some right lateral thigh pain. PAST MEDICAL / SURGICAL / SOCIAL / FAMILY HISTORY      has a past medical history of Anemia, Bleeding, Cervical cancer ([address] Depression, Diabetes mellitus ([address] GERD (gastroesophageal reflux disease), Metastatic cancer ([address] Ovarian cancer ([address] Post chemo evaluation, and Splenic lesion. has a past surgical history that includes Hysterectomy, total abdominal; Port Surgery; Tonsillectomy; IR PORT PLACEMENT > 5 YEARS (8/24/2020); Anus surgery; Abscess Drainage (2013); colectomy (03/2013); and IR EMBOLIZATION HEMORRHAGE (10/05/2020).      Social History     Socioeconomic History    Marital status: Single     Spouse name: Not on file    Number of children: Not on file    Years of education: Not on file    Highest education level: Not on file   Occupational History    Not on file   Social Needs    Financial resource strain: Not on file    Food insecurity     Worry: Not on file Inability: Not on file    Transportation needs     Medical: Not on file     Non-medical: Not on file   Tobacco Use    Smoking status: Former Smoker     Packs/day: 1.00    Smokeless tobacco: Current User   Substance and Sexual Activity    Alcohol use: Not Currently    Drug use: Never    Sexual activity: Not on file   Lifestyle    Physical activity     Days per week: Not on file     Minutes per session: Not on file    Stress: Not on file   Relationships    Social connections     Talks on phone: Not on file     Gets together: Not on file     Attends Jewish service: Not on file     Active member of club or organization: Not on file     Attends meetings of clubs or organizations: Not on file     Relationship status: Not on file    Intimate partner violence     Fear of current or ex partner: Not on file     Emotionally abused: Not on file     Physically abused: Not on file     Forced sexual activity: Not on file   Other Topics Concern    Not on file   Social History Narrative    Not on file       Family History   Problem Relation Age of Onset    Alcohol Abuse Mother     Cirrhosis Mother        Allergies:  Ceftriaxone    Home Medications:  Prior to Admission medications    Medication Sig Start Date End Date Taking? Authorizing Provider   oxyCODONE-acetaminophen (PERCOCET) 5-325 MG per tablet TAKE 1 TO 2 TABLETS BY MOUTH EVERY 4 HOURS FOR 7 DAYS AS NEEDED . DO NOT EXCEED 8 PER 24 HOURS (IF NEEDED FOR BREAKTHROUGH PAIN) 12/24/20  Yes Historical Provider, MD   apixaban (ELIQUIS) 5 MG TABS tablet Take 10 mg by mouth 2 times daily 10 mg 2x daily for first 7 days then reduce to 5 mg 2x daily   Yes Historical Provider, MD   metFORMIN (GLUCOPHAGE-XR) 500 MG extended release tablet Take 2 tablets by mouth twice daily 1/25/21  Yes Perla Ramos MD   zolpidem (AMBIEN) 5 MG tablet Take 1 tablet by mouth nightly as needed for Sleep for up to 30 days.  1/15/21 2/14/21 Yes Perla Ramos MD HYDROcodone-acetaminophen (NORCO) 5-325 MG per tablet Take 1 tablet by mouth every 6 hours as needed for Pain for up to 30 days. 1/15/21 2/14/21 Yes Julia Jolly MD   amoxicillin-clavulanate (AUGMENTIN) 875-125 MG per tablet Take 1 tablet by mouth every 12 hours pls do the blood test weekly - if the urine becomes dark or the right upper abd start being sore, pls call DR MARTÍNEZ 1/14/21 2/13/21 Yes Kieran Finn MD   lisinopril (PRINIVIL;ZESTRIL) 2.5 MG tablet Take 1 tablet by mouth daily 1/14/21  Yes Richard Haq MD   metoprolol succinate (TOPROL XL) 25 MG extended release tablet Take 1 tablet by mouth daily 1/14/21  Yes Richard Haq MD   levETIRAcetam (KEPPRA) 750 MG tablet Take 2 tablets by mouth 2 times daily 10/28/20  Yes Demarco Juárez MD   prochlorperazine (COMPAZINE) 10 MG tablet Take 10 mg by mouth every 6 hours as needed   Yes Historical Provider, MD   tiZANidine (ZANAFLEX) 4 MG tablet Take 4 mg by mouth every 6 hours as needed   Yes Historical Provider, MD   ibuprofen (ADVIL;MOTRIN) 200 MG tablet Take 200 mg by mouth every 6 hours as needed for Pain   Yes Historical Provider, MD   pantoprazole (PROTONIX) 40 MG tablet Take 40 mg by mouth 2 times daily   Yes Historical Provider, MD   aspirin 81 MG chewable tablet Take 81 mg by mouth nightly   Yes Historical Provider, MD   sertraline (ZOLOFT) 100 MG tablet Take 50 mg by mouth daily  8/8/20  Yes Historical Provider, MD       REVIEW OFSYSTEMS    (2-9 systems for level 4, 10 or more for level 5)      Review of Systems   Constitutional: Negative for chills, diaphoresis, fatigue and fever. Respiratory: Negative for cough, chest tightness, shortness of breath and wheezing. Cardiovascular: Negative for chest pain, palpitations and leg swelling. Gastrointestinal: Negative for abdominal pain, constipation, diarrhea, nausea and vomiting. Endocrine: Negative for polydipsia, polyphagia and polyuria. Genitourinary: Negative for difficulty urinating, dysuria and urgency. Musculoskeletal: Positive for back pain and myalgias (RLE, lateral thigh). Negative for arthralgias, neck pain and neck stiffness. Skin: Negative for color change, pallor and rash. Neurological: Negative for dizziness, weakness, light-headedness and headaches. PHYSICAL EXAM   (up to 7 for level 4, 8 or more forlevel 5)      INITIAL VITALS:   ED Triage Vitals   BP Temp Temp Source Pulse Resp SpO2 Height Weight   01/28/21 2026 01/28/21 2017 01/28/21 2017 01/28/21 2026 01/28/21 2026 01/28/21 2026 01/28/21 2026 01/28/21 2026   (!) 178/76 97 °F (36.1 °C) Oral 78 16 99 % 5' 5\" (1.651 m) 161 lb (73 kg)       Physical Exam  Vitals signs and nursing note reviewed. Constitutional:       General: She is not in acute distress. Appearance: She is well-developed. She is not diaphoretic. HENT:      Head: Normocephalic and atraumatic. Eyes:      General: No scleral icterus. Conjunctiva/sclera: Conjunctivae normal.      Pupils: Pupils are equal, round, and reactive to light. Neck:      Musculoskeletal: Normal range of motion and neck supple. Vascular: No JVD. Trachea: No tracheal deviation. Cardiovascular:      Rate and Rhythm: Normal rate and regular rhythm. Pulses: Normal pulses. Heart sounds: Normal heart sounds. No murmur. No friction rub. Pulmonary:      Effort: Pulmonary effort is normal. No respiratory distress. Breath sounds: Normal breath sounds. No wheezing. Chest:      Chest wall: No tenderness. Abdominal:      General: Abdomen is flat. Bowel sounds are normal. There is no distension. Palpations: Abdomen is soft. Tenderness: There is no abdominal tenderness. There is no guarding. Comments: NOEL drain left upper quadrant   Musculoskeletal:         General: Tenderness (Tenderness to palpation of the lower sacrum, tenderness palpation right lateral thigh) present.    Skin: General: Skin is warm and dry. Capillary Refill: Capillary refill takes less than 2 seconds. Coloration: Skin is not pale. Findings: No erythema. Neurological:      General: No focal deficit present. Mental Status: She is alert and oriented to person, place, and time. Mental status is at baseline. Cranial Nerves: No cranial nerve deficit. Sensory: No sensory deficit. Motor: No weakness.    Psychiatric:         Mood and Affect: Mood normal.         Behavior: Behavior normal.         DIFFERENTIAL  DIAGNOSIS     PLAN (LABS / IMAGING / EKG):  Orders Placed This Encounter   Procedures    Culture, Blood 1    Culture, Blood 1    CT ABDOMEN PELVIS W IV CONTRAST Additional Contrast? None    CT LUMBAR SPINE TRAUMA RECONSTRUCTION    CBC Auto Differential    Lactic Acid, Plasma    Basic Metabolic Panel w/ Reflex to MG    C-Reactive Protein    Sedimentation Rate    Basic Metabolic Panel w/ Reflex to MG    CBC auto differential    DIET GENERAL;    Vital signs per unit routine    Notify physician    Notify physician    Up with assistance    Place intermittent pneumatic compression device    Vital signs per unit routine    Notify physician    Daily weights    Intake and output    Up as tolerated    HYPOGLYCEMIA TREATMENT: blood glucose less than 50 mg/dL and patient  ALERT and TOLERATING PO    HYPOGLYCEMIA TREATMENT: blood glucose less than 70 mg/dL and patient ALERT and TOLERATING PO    HYPOGLYCEMIA TREATMENT: blood glucose less than 70 mg/dL and patient NOT ALERT or NPO    Full Code    Inpatient consult to Internal Medicine    Inpatient consult to Case Management    Inpatient consult to Infectious Diseases    Inpatient consult to Hem/Onc    OT eval and treat    PT evaluation and treat    Initiate Oxygen Therapy Protocol    POCT glucose    POCT Glucose    POC Glucose Fingerstick    PATIENT STATUS (FROM ED OR OR/PROCEDURAL) Inpatient MEDICATIONS ORDERED:  Orders Placed This Encounter   Medications    morphine injection 4 mg    ondansetron (ZOFRAN) injection 4 mg    iopamidol (ISOVUE-370) 76 % injection 75 mL    morphine injection 2 mg    ondansetron (ZOFRAN) injection 4 mg    HYDROmorphone (DILAUDID) injection 1 mg    ondansetron (ZOFRAN) injection 4 mg    ondansetron (ZOFRAN) 4 MG/2ML injection     [employer]: cabinet override    HYDROmorphone (DILAUDID) 1 MG/ML injection     [employer]: cabinet override    diazePAM (VALIUM) tablet 5 mg    DISCONTD: HYDROmorphone (DILAUDID) injection 1 mg    sodium chloride flush 0.9 % injection 10 mL    sodium chloride flush 0.9 % injection 10 mL    HYDROmorphone (DILAUDID) injection 0.5 mg    apixaban (ELIQUIS) tablet 5 mg    aspirin chewable tablet 81 mg    HYDROcodone-acetaminophen (NORCO) 5-325 MG per tablet 1 tablet    DISCONTD: ibuprofen (ADVIL;MOTRIN) tablet 200 mg    levETIRAcetam (KEPPRA) tablet 1,500 mg    lisinopril (PRINIVIL;ZESTRIL) tablet 2.5 mg    DISCONTD: metFORMIN (GLUCOPHAGE-XR) extended release tablet 500 mg    metoprolol succinate (TOPROL XL) extended release tablet 25 mg    pantoprazole (PROTONIX) tablet 40 mg    sertraline (ZOLOFT) tablet 50 mg    tiZANidine (ZANAFLEX) tablet 4 mg    zolpidem (AMBIEN) tablet 5 mg    sodium chloride flush 0.9 % injection 10 mL    sodium chloride flush 0.9 % injection 10 mL    DISCONTD: promethazine (PHENERGAN) tablet 12.5 mg    ondansetron (ZOFRAN) injection 4 mg    polyethylene glycol (GLYCOLAX) packet 17 g    OR Linked Order Group     acetaminophen (TYLENOL) tablet 650 mg     acetaminophen (TYLENOL) suppository 650 mg    DISCONTD: morphine injection 4 mg    DISCONTD: meropenem (MERREM) 1,000 mg in sodium chloride 0.9 % 100 mL IVPB (mini-bag)     Order Specific Question:   Antimicrobial Indications     Answer:   Intra-Abdominal Infection All other components within normal limits   BASIC METABOLIC PANEL W/ REFLEX TO MG FOR LOW K - Abnormal; Notable for the following components:    Glucose 130 (*)     CREATININE 0.33 (*)     All other components within normal limits   C-REACTIVE PROTEIN - Abnormal; Notable for the following components:    CRP 6.5 (*)     All other components within normal limits   SEDIMENTATION RATE - Abnormal; Notable for the following components:    Sed Rate 70 (*)     All other components within normal limits   POC GLUCOSE FINGERSTICK - Abnormal; Notable for the following components:    POC Glucose 127 (*)     All other components within normal limits   CULTURE, BLOOD 1   CULTURE, BLOOD 1   LACTIC ACID, PLASMA   BASIC METABOLIC PANEL W/ REFLEX TO MG FOR LOW K   CBC WITH AUTO DIFFERENTIAL   POCT GLUCOSE   POCT GLUCOSE   POCT GLUCOSE   POCT GLUCOSE         RADIOLOGY:  Ct Abdomen Pelvis W Iv Contrast Additional Contrast? None    Result Date: 1/28/2021 measures slightly larger at 4.7 x 1.8 cm (previously 3.6 x 1.6 cm). The midline pelvic lymph node is increased in size, now measuring 1.2 cm. The right pelvic soft tissue lesion is stable to minimally increased in size at 4.0 x 2.4 cm left iliac node is increased in size, now measuring 4.3 x 2.0 cm (previously 3.2 x 1.7 cm). Percutaneous catheter terminating in the left pelvis is unchanged. Bones/Soft Tissues: Sclerotic osseous lesions are consistent with metastases. 1. Decrease in size of the splenic fluid collection/abscess. Pigtail catheter remains in place. 2. Slight decrease in size of the left pleural effusion with persistent moderate pericardial effusion. 3. Multiple abdominal and pelvic lymph nodes are again noted, some are increased in size while others have decreased. 4. Osseous and pulmonary metastases are again noted. 5. Cholelithiasis without CT evidence of cholecystitis.      Ct Lumbar Spine Trauma Reconstruction    Result Date: 1/28/2021 EXAMINATION: CT OF THE LUMBAR SPINE WITHOUT CONTRAST  1/28/2021 TECHNIQUE: CT of the lumbar spine was performed without the administration of intravenous contrast. Multiplanar reformatted images are provided for review. Dose modulation, iterative reconstruction, and/or weight based adjustment of the mA/kV was utilized to reduce the radiation dose to as low as reasonably achievable. COMPARISON: None HISTORY: ORDERING SYSTEM PROVIDED HISTORY: METASTASES TECHNOLOGIST PROVIDED HISTORY: metastatic ovarian cancer, concern for lumbosacral fx Reason for Exam: BACK PAIN, R/O METS FINDINGS: BONES/ALIGNMENT: There are 5 non-rib-bearing, lumbar type vertebral bodies. There is sacralization of L5. There are sclerotic lesions within the T11, T12, L1, L2, and L4 vertebral bodies. There is also a sclerotic lesion in the right iliac bone. Vertebral body heights are maintained. No displaced fracture. No significant listhesis. DEGENERATIVE CHANGES: There is mild intervertebral disc height loss at L4-5. SOFT TISSUES/RETROPERITONEUM: Soft tissue findings are dictated separately. Sclerotic osseous metastatic disease within the lumbar spine and pelvis. No pathologic fracture identified. EKG      All EKG's are interpreted by the Emergency Department Physicianwho either signs or Co-signs this chart in the absence of a cardiologist.    EMERGENCY DEPARTMENT COURSE:  ED Course as of Jan 29 1156   Thu Jan 28, 2021   2125 Elevated CRP, elevated ESR   Sed Rate(!): 70 [JG]   2240 Patient notified of results, continues to have low back pain. Patient was given Dilaudid with some improvement. Will admit to medicine. [JG]   2254 Patient admitted to internal medicine.     [JG]      ED Course User Index  [JG] Janna Monroe, DO          PROCEDURES:  None    CONSULTS:  IP CONSULT TO INTERNAL MEDICINE  IP CONSULT TO CASE MANAGEMENT  IP CONSULT TO INFECTIOUS DISEASES  IP CONSULT TO HEM/ONC    CRITICAL CARE: Please see attending note    FINAL IMPRESSION      1. Intractable low back pain    2. 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, unspecified laterality ([facility] [address]    3. Nausea and vomiting, intractability of vomiting not specified, unspecified vomiting type          DISPOSITION / PLAN     DISPOSITION  Admitted to internal medicine 1/28/2021 at 10:50pm      PATIENT REFERRED TO:  No follow-up provider specified.     DISCHARGE MEDICATIONS:  Current Discharge Medication List          Parisa Vaughn DO  Emergency Medicine Resident    (Please note that portions of this note were completed with a voice recognition program.Efforts were made to edit the dictations but occasionally words are mis-transcribed.)     Parisa Vaughn DO  Resident  01/29/21 0216

## 2021-01-29 NOTE — PROGRESS NOTES
Physical Therapy  DATE: 2021    NAME: Azul Gilmore  MRN: [id number]   : 1965    Patient not seen this date for Physical Therapy due to:  [] Blood transfusion in progress  [] Hemodialysis  [x] Patient Declined--pt evaluated by OT this AM and dc'd d/t independence; pt denies PT needs, states she gets around \"fine\". Educated re: OOB/up to chair as much as possible to prevent loss of strength/mobility/independence. Pt indicated understanding. Defer PT eval per pt wishes.  [] Spine Precautions   [] Strict Bedrest  [] Surgery/ Procedure  [] Testing      [] Other        [] PT is being discontinued at this time. Patient independent. No further needs. [] PT is being discontinued at this time due to declining physical/ medical status. Therapy is not appropriate at this time.     Gorge Ramos, PT

## 2021-01-29 NOTE — ED NOTES
Pt still vomiting at this time. Will contact internal med about medications.       Samantha Powell RN  01/29/21 [id number]

## 2021-01-29 NOTE — ED PROVIDER NOTES
[facility] ED  Emergency Department  Emergency Medicine Resident Sign-out     Care of Tom Miller was assumed from Dr. Mana Leroy and is being seen for Back Pain and Nausea   . The patient's initial evaluation and plan have been discussed with the prior provider who initially evaluated the patient.      EMERGENCY DEPARTMENT COURSE / MEDICAL DECISION MAKING:       MEDICATIONS GIVEN:  Orders Placed This Encounter   Medications    morphine injection 4 mg    ondansetron (ZOFRAN) injection 4 mg    iopamidol (ISOVUE-370) 76 % injection 75 mL    morphine injection 2 mg    ondansetron (ZOFRAN) injection 4 mg    HYDROmorphone (DILAUDID) injection 1 mg    ondansetron (ZOFRAN) injection 4 mg    ondansetron (ZOFRAN) 4 MG/2ML injection     [employer]: cabinet override    HYDROmorphone (DILAUDID) 1 MG/ML injection     [employer]: cabinet override    diazePAM (VALIUM) tablet 5 mg    DISCONTD: HYDROmorphone (DILAUDID) injection 1 mg       LABS / RADIOLOGY:     Labs Reviewed   CBC WITH AUTO DIFFERENTIAL - Abnormal; Notable for the following components:       Result Value    WBC 13.4 (*)     Hemoglobin 10.6 (*)     Hematocrit 36.0 (*)     MCH 24.7 (*)     RDW 24.2 (*)     Platelets 038 (*)     Seg Neutrophils 72 (*)     Lymphocytes 18 (*)     Segs Absolute 9.66 (*)     All other components within normal limits   BASIC METABOLIC PANEL W/ REFLEX TO MG FOR LOW K - Abnormal; Notable for the following components:    Glucose 130 (*)     CREATININE 0.33 (*)     All other components within normal limits   C-REACTIVE PROTEIN - Abnormal; Notable for the following components:    CRP 6.5 (*)     All other components within normal limits   SEDIMENTATION RATE - Abnormal; Notable for the following components:    Sed Rate 70 (*)     All other components within normal limits   LACTIC ACID, PLASMA       CT ABDOMEN PELVIS W IV CONTRAST Additional Contrast? None   Final Result 1. Decrease in size of the splenic fluid collection/abscess. Pigtail   catheter remains in place. 2. Slight decrease in size of the left pleural effusion with persistent   moderate pericardial effusion. 3. Multiple abdominal and pelvic lymph nodes are again noted, some are   increased in size while others have decreased. 4. Osseous and pulmonary metastases are again noted. 5. Cholelithiasis without CT evidence of cholecystitis. CT LUMBAR SPINE TRAUMA RECONSTRUCTION   Final Result   Sclerotic osseous metastatic disease within the lumbar spine and pelvis. No   pathologic fracture identified. RECENT VITALS:     Temp: 97 °F (36.1 °C),  Pulse: 76, Resp: 15, BP: (!) 159/72, SpO2: 99 %    This patient is a 60 y.o. Female with intractable low back pain, patient has history of metastatic ovarian cancer, recently had position of her spleen, developed an abscess, has pigtail in place. Admitted for intractable pain, will likely need a palliative consult. Admitted to internal medicine earlier this month. Awaiting discussion with internal medicine    OUTSTANDING TASKS / RECOMMENDATIONS:      1. Await Bed placement       FINAL IMPRESSION:     1. Intractable low back pain    2. 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, unspecified laterality ([facility] [address]        DISPOSITION:       DISPOSITION:  []  Discharge   []  Transfer -    [x]  Admission -     []  Against Medical Advice   []  Eloped   FOLLOW-UP: No follow-up provider specified.    DISCHARGE MEDICATIONS: New Prescriptions    No medications on file          Jaspal Khalil DO  Emergency Medicine Resident  [id number] [place]  Resident  01/29/21 [id number]

## 2021-01-29 NOTE — H&P
[address]     Department of Internal Medicine - Staff Internal Medicine Teaching Service          ADMISSION NOTE/HISTORY AND PHYSICAL EXAMINATION   Date: 1/28/2021  Patient Name: Altaf Whatley  Date of admission: 1/28/2021  8:18 PM  YOB: 1965  PCP: No primary care provider on file. History Obtained From:  patient, electronic medical record    CHIEF COMPLAINT     Chief complaint: Back pain    HISTORY OF PRESENTING ILLNESS     The patient is a pleasant 60 y.o. female presents with a chief complaint of neck pain which is acute she was recently discharged from the hospital on 1/1421 after being admitted for status epilepticus with history of metastatic ovarian carcinoma. She developed pleural effusions secondary to splenic abscess for which she was treated with IV meropenem with pigtail catheter in place. She was discharged on Augmentin p.o. as she cannot afford IV meropenem. She has been following with heme-onc for metastatic ovarian carcinoma. She came into the ER today for evaluation of back pain which is radiating to her thigh. initial imaging ct lumbar and ct abdomen showed metastatic lesions in lumbar spine and pelvis, decreased in size of splenic fluid collection. She has been admitted under internal medicine service for metastatic ovarian carcinoma with mild pleural effusion on the left lower lung.       Review of Systems:  General ROS: Completed and except as mentioned above were negative   HEENT ROS: Completed and except as mentioned above were negative   Allergy and Immunology ROS:  Completed and except as mentioned above were negative  Hematological and Lymphatic ROS:  Completed and except as mentioned above were negative  Respiratory ROS:  Completed and except as mentioned above were negative  Cardiovascular ROS:  Completed and except as mentioned above were negative  Gastrointestinal ROS: Completed and except as mentioned above were negative Genito-Urinary ROS:  Completed and except as mentioned above were negative  Musculoskeletal ROS:  Completed and except as mentioned above were negative  Neurological ROS:  Completed and except as mentioned above were negative  Skin & Dermatological ROS:  Completed and except as mentioned above were negative  Psychological ROS:  Completed and except as mentioned above were negative    PAST MEDICAL HISTORY     Past Medical History:   Diagnosis Date    Anemia     Bleeding 10/2020    intra-abdominal bleeding -due to splenic mass with GI infiltration. Status post embolization    [facility]     Depression     Diabetes mellitus ([facility]     GERD (gastroesophageal reflux disease)     Metastatic cancer ([facility] [address] [facility] 10/2020    extensive intraabdominal and splenic involvement and lung mets.  Ovarian cancer ([facility] [address] [facility]     low grade serous ovarian carcinoma    Post chemo evaluation     2007: Chemo via med onc (Dr. Bolivar Haq), 2008: Leland Kroner due to rising CA-125, 2013: intraperitoneal chemo,12/2015: Ca125 - 25     Splenic lesion        PAST SURGICAL HISTORY     Past Surgical History:   Procedure Laterality Date    ABSCESS DRAINAGE  2013    Franca rectal    ANUS SURGERY      ANAL FISSURECTOMY    COLECTOMY  03/2013    ex lap, tumor debulking, transverse colectomy w reanastamosis, subgastric omentectomy, intraperitoneal port placement    HYSTERECTOMY, TOTAL ABDOMINAL      IR EMBOLIZATION HEMORRHAGE  10/05/2020    intra-abdominal bleeding -due to splenic mass with GI infiltration. Status post embolization [employer] interlock coils x7. mri condtional 3t ok, safe immediately post implant.     IR PORT PLACEMENT EQUAL OR GREATER THAN 5 YEARS  8/24/2020    IR PORT PLACEMENT EQUAL OR GREATER THAN 5 YEARS 8/24/2020 Dusty Grewal MD [facility]    PORT SURGERY      IP Port    TONSILLECTOMY         ALLERGIES     Ceftriaxone    MEDICATIONS PRIOR TO ADMISSION     Prior to Admission medications Medication Sig Start Date End Date Taking? Authorizing Provider   oxyCODONE-acetaminophen (PERCOCET) 5-325 MG per tablet TAKE 1 TO 2 TABLETS BY MOUTH EVERY 4 HOURS FOR 7 DAYS AS NEEDED . DO NOT EXCEED 8 PER 24 HOURS (IF NEEDED FOR BREAKTHROUGH PAIN) 12/24/20  Yes Historical Provider, MD   apixaban (ELIQUIS) 5 MG TABS tablet Take 10 mg by mouth 2 times daily 10 mg 2x daily for first 7 days then reduce to 5 mg 2x daily   Yes Historical Provider, MD   metFORMIN (GLUCOPHAGE-XR) 500 MG extended release tablet Take 2 tablets by mouth twice daily 1/25/21  Yes Dia Davis MD   zolpidem (AMBIEN) 5 MG tablet Take 1 tablet by mouth nightly as needed for Sleep for up to 30 days. 1/15/21 2/14/21 Yes Dia Davis MD   HYDROcodone-acetaminophen (NORCO) 5-325 MG per tablet Take 1 tablet by mouth every 6 hours as needed for Pain for up to 30 days.  1/15/21 2/14/21 Yes Dia Davis MD   amoxicillin-clavulanate (AUGMENTIN) 875-125 MG per tablet Take 1 tablet by mouth every 12 hours pls do the blood test weekly - if the urine becomes dark or the right upper abd start being sore, pls call DR MARTÍNEZ 1/14/21 2/13/21 Yes Santana Dumont MD   lisinopril (PRINIVIL;ZESTRIL) 2.5 MG tablet Take 1 tablet by mouth daily 1/14/21  Yes Laya Strong MD   metoprolol succinate (TOPROL XL) 25 MG extended release tablet Take 1 tablet by mouth daily 1/14/21  Yes Laya Strong MD   levETIRAcetam (KEPPRA) 750 MG tablet Take 2 tablets by mouth 2 times daily 10/28/20  Yes Fermin Brown MD   prochlorperazine (COMPAZINE) 10 MG tablet Take 10 mg by mouth every 6 hours as needed   Yes Historical Provider, MD   tiZANidine (ZANAFLEX) 4 MG tablet Take 4 mg by mouth every 6 hours as needed   Yes Historical Provider, MD   ibuprofen (ADVIL;MOTRIN) 200 MG tablet Take 200 mg by mouth every 6 hours as needed for Pain   Yes Historical Provider, MD   pantoprazole (PROTONIX) 40 MG tablet Take 40 mg by mouth 2 times daily   Yes Historical Provider, MD aspirin 81 MG chewable tablet Take 81 mg by mouth nightly   Yes Historical Provider, MD   sertraline (ZOLOFT) 100 MG tablet Take 50 mg by mouth daily  20  Yes Historical Provider, MD       SOCIAL HISTORY     Tobacco: History of smoking, 1 pack a day  Alcohol: Reports no history  Illicits: Reports no history of drug abuse  Occupation:     FAMILY HISTORY     Family History   Problem Relation Age of Onset    Alcohol Abuse Mother     Cirrhosis Mother        PHYSICAL EXAM     Vitals: BP (!) 183/88   Pulse 81   Temp 97 °F (36.1 °C) (Oral)   Resp 15   Ht 5' 5\" (1.651 m)   Wt 161 lb (73 kg)   SpO2 97%   BMI 26.79 kg/m²   Tmax: Temp (24hrs), Av °F (36.1 °C), Min:97 °F (36.1 °C), Max:97 °F (36.1 °C)    Last Body weight:   Wt Readings from Last 3 Encounters:   21 161 lb (73 kg)   21 166 lb 7.2 oz (75.5 kg)   21 165 lb (74.8 kg)     Body Mass Index : Body mass index is 26.79 kg/m². PHYSICAL EXAMINATION:  Constitutional: This is a well developed, well nourished, 25-29.9 - Overweight 60y.o. year old female who is alert, oriented, cooperative and in no apparent distress. Head:normocephalic and atraumatic. EENT:  PERRLA. No conjunctival injections. Septum was midline, mucosa was without erythema, exudates or cobblestoning. No thrush was noted. Neck: Supple without thyromegaly. No elevated JVP. Trachea was midline. Respiratory: Chest was symmetrical without dullness to percussion. Breath sounds bilaterally were clear to auscultation. There were no wheezes, rhonchi or rales. There is no intercostal retraction or use of accessory muscles. No egophony noted. Cardiovascular: Regular without murmur, clicks, gallops or rubs. Abdomen: Slightly rounded and soft without organomegaly. No rebound, rigidity or guarding was appreciated. Lymphatic: No lymphadenopathy. Musculoskeletal: Tenderness over the lumbar spine normal curvature of the spine. No gross muscle weakness. Extremities:  No lower extremity edema, ulcerations, tenderness, varicosities or erythema. Muscle size, tone and strength are normal.  No involuntary movements are noted. Skin:  Warm and dry. Good color, turgor and pigmentation. No lesions or scars.   No cyanosis or clubbing  Neurological/Psychiatric: The patient's general behavior, level of consciousness, thought content and emotional status is normal.          INVESTIGATIONS     Laboratory Testing:     Recent Results (from the past 24 hour(s))   CBC Auto Differential    Collection Time: 01/28/21  8:58 PM   Result Value Ref Range    WBC 13.4 (H) 3.5 - 11.3 k/uL    RBC 4.30 3.95 - 5.11 m/uL    Hemoglobin 10.6 (L) 11.9 - 15.1 g/dL    Hematocrit 36.0 (L) 36.3 - 47.1 %    MCV 83.7 82.6 - 102.9 fL    MCH 24.7 (L) 25.2 - 33.5 pg    MCHC 29.4 28.4 - 34.8 g/dL    RDW 24.2 (H) 11.8 - 14.4 %    Platelets 913 (H) 408 - 453 k/uL    MPV 9.6 8.1 - 13.5 fL    NRBC Automated 0.0 0.0 per 100 WBC    Differential Type NOT REPORTED     WBC Morphology NOT REPORTED     RBC Morphology NOT REPORTED     Platelet Estimate NOT REPORTED     Immature Granulocytes 0 0 %    Seg Neutrophils 72 (H) 36 - 65 %    Lymphocytes 18 (L) 24 - 43 %    Monocytes 6 3 - 12 %    Eosinophils % 3 1 - 4 %    Basophils 1 0 - 2 %    Absolute Immature Granulocyte 0.00 0.00 - 0.30 k/uL    Segs Absolute 9.66 (H) 1.50 - 8.10 k/uL    Absolute Lymph # 2.41 1.10 - 3.70 k/uL    Absolute Mono # 0.80 0.10 - 1.20 k/uL    Absolute Eos # 0.40 0.00 - 0.44 k/uL    Basophils Absolute 0.13 0.00 - 0.20 k/uL    Morphology ANISOCYTOSIS PRESENT    Lactic Acid, Plasma    Collection Time: 01/28/21  8:58 PM   Result Value Ref Range    Lactic Acid NOT REPORTED mmol/L    Lactic Acid, Whole Blood 1.5 0.7 - 2.1 mmol/L   Basic Metabolic Panel w/ Reflex to MG    Collection Time: 01/28/21  8:58 PM   Result Value Ref Range    Glucose 130 (H) 70 - 99 mg/dL    BUN 6 6 - 20 mg/dL    CREATININE 0.33 (L) 0.50 - 0.90 mg/dL Bun/Cre Ratio NOT REPORTED 9 - 20    Calcium 9.2 8.6 - 10.4 mg/dL    Sodium 139 135 - 144 mmol/L    Potassium 3.8 3.7 - 5.3 mmol/L    Chloride 103 98 - 107 mmol/L    CO2 25 20 - 31 mmol/L    Anion Gap 11 9 - 17 mmol/L    GFR Non-African American >60 >60 mL/min    GFR African American >60 >60 mL/min    GFR Comment          GFR Staging NOT REPORTED    C-Reactive Protein    Collection Time: 01/28/21  8:58 PM   Result Value Ref Range    CRP 6.5 (H) 0.0 - 5.0 mg/L   Sedimentation Rate    Collection Time: 01/28/21  8:58 PM   Result Value Ref Range    Sed Rate 70 (H) 0 - 30 mm       Imaging:   Ct Abdomen Pelvis W Iv Contrast Additional Contrast? None    Result Date: 1/28/2021  1. Decrease in size of the splenic fluid collection/abscess. Pigtail catheter remains in place. 2. Slight decrease in size of the left pleural effusion with persistent moderate pericardial effusion. 3. Multiple abdominal and pelvic lymph nodes are again noted, some are increased in size while others have decreased. 4. Osseous and pulmonary metastases are again noted. 5. Cholelithiasis without CT evidence of cholecystitis. Ct Lumbar Spine Trauma Reconstruction    Result Date: 1/28/2021  Sclerotic osseous metastatic disease within the lumbar spine and pelvis. No pathologic fracture identified. ASSESSMENT & PLAN     ASSESSMENT / PLAN:     IMPRESSION  This is a 60 y.o. female who presented with acute onset of back pain radiating to the thigh with history of metastatic ovarian carcinoma and found to have osteolytic metastatic lesions on lumbar spine on CT abdomen and CT lumbar spine. Patient admitted to inpatient status because of metastatic ovarian carcinoma, splenic abscess, left-sided pleural effusions.     Principal Problem:    Cancer, metastatic to bone [facility])  Active Problems:    Malignant neoplasm of ovary (HCC)    Seizure (HCC)    Anemia    Splenic abscess    Ovarian cancer (HCC)    Pleural effusion    Essential hypertension Resolved Problems:    * No resolved hospital problems. *      Metastatic ovarian carcinoma : Patient due for follow-up with with heme-onc, will consult heme-onc. History of blood clot likely secondary to underlying malignancy continue on Eliquis 5 mg twice daily. History of seizures: Continue on plan 1500 mg twice daily    Essential hypertension: Continue on lisinopril 2.5 mg,  metoprolol 25 mg    Osteolytic lesions on lumbar spine : Symptomatic management of pain with morphine 4 mg every 4, Percocet oral    Splenic abscess: Pigtail catheter drainage in place, patient afebrile, will consult ID for starting IV meropenem, follow-up blood cultures    Pleural effusion: Mild effusions noted on left lower lobe, monitor for now    Diabetes mellitus type II borderline: A1c 6 continue on Metformin 500 mg twice daily, control blood glucose    Goals of care: We will consult palliative care    DVT ppx: On Eliquis  GI ppx: Not indicated    PT/OT/SW: Consulted  Discharge Planning: Ongoing    Hugo Ambriz MD  Internal Medicine Resident, PGY-1  [address], [place]  1/28/2021, 11:20 PM

## 2021-01-29 NOTE — CARE COORDINATION
Case Management Initial Discharge Plan  [facility],             Met with:patient to discuss discharge plans. Information verified: address, contacts, phone number, , insurance Yes    Emergency Contact/Next of Kin name & number: Ebony Blake (daughter) [phone]    PCP: Benita Avery  Date of last visit: has not seen    Insurance Provider: [facility] and [employer]    Discharge Planning    Living Arrangements:  Children   Support Systems:  Family Members    Home has 2 stories  2 stairs to climb to get into front door, 1 flight stairs to climb to reach second floor  Location of bedroom/bathroom in home 1st    Patient able to perform ADL's:Independent    Current Services (outpatient & in home) [facility]  DME equipment: has walker and shower chair available but does not use  DME provider:     Receiving oral anticoagulation therapy? Yes Eliquis    If indicated:   Physician managing anticoagulation treatment:   Where does patient obtain lab work for ATC treatment? Potential Assistance Needed:       Patient agreeable to home care: Yes  Freedom of choice provided:  yes    Prior SNF/Rehab Placement and Facility:   Agreeable to SNF/Rehab: No  [place] of choice provided: n/a     Evaluation: no    Expected Discharge date:       Patient expects to be discharged to:  home with son and [facility]  Follow Up Appointment: Best Day/ Time:      Transportation provider: family  Transportation arrangements needed for discharge: No family will transport home    Readmission Risk              Risk of Unplanned Readmission:        26             Does patient have a readmission risk score greater than 14?: Yes  If yes, follow-up appointment must be made within 7 days of discharge. Goals of Care: increased comfort      Discharge Plan: home with son and [facility].  Will need follow up appt with Dr Shaylee Velasco          Electronically signed by Connie Donato RN on 21 at 12:31 PM EST

## 2021-01-29 NOTE — ED NOTES
Pt actively vomiting at this time and provided a new emesis basin. awaiting med orders to be verified by pharmacy. Pt's daughter left to go home at this time.      Trish Wright RN  01/28/21 1913

## 2021-01-30 LAB
ABSOLUTE EOS #: 0.6 K/UL (ref 0–0.4)
ABSOLUTE IMMATURE GRANULOCYTE: 0.09 K/UL (ref 0–0.3)
ABSOLUTE LYMPH #: 2.92 K/UL (ref 1–4.8)
ABSOLUTE MONO #: 0.77 K/UL (ref 0.1–0.8)
ANION GAP SERPL CALCULATED.3IONS-SCNC: 12 MMOL/L (ref 9–17)
BASOPHILS # BLD: 1 % (ref 0–2)
BASOPHILS ABSOLUTE: 0.09 K/UL (ref 0–0.2)
BUN BLDV-MCNC: 6 MG/DL (ref 6–20)
BUN/CREAT BLD: ABNORMAL (ref 9–20)
CALCIUM SERPL-MCNC: 9.3 MG/DL (ref 8.6–10.4)
CHLORIDE BLD-SCNC: 104 MMOL/L (ref 98–107)
CO2: 23 MMOL/L (ref 20–31)
CREAT SERPL-MCNC: 0.42 MG/DL (ref 0.5–0.9)
DIFFERENTIAL TYPE: ABNORMAL
EOSINOPHILS RELATIVE PERCENT: 7 % (ref 1–4)
GFR AFRICAN AMERICAN: >60 ML/MIN
GFR NON-AFRICAN AMERICAN: >60 ML/MIN
GFR SERPL CREATININE-BSD FRML MDRD: ABNORMAL ML/MIN/{1.73_M2}
GFR SERPL CREATININE-BSD FRML MDRD: ABNORMAL ML/MIN/{1.73_M2}
GLUCOSE BLD-MCNC: 100 MG/DL (ref 65–105)
GLUCOSE BLD-MCNC: 107 MG/DL (ref 65–105)
GLUCOSE BLD-MCNC: 108 MG/DL (ref 65–105)
GLUCOSE BLD-MCNC: 80 MG/DL (ref 65–105)
GLUCOSE BLD-MCNC: 91 MG/DL (ref 70–99)
HCT VFR BLD CALC: 33.2 % (ref 36.3–47.1)
HEMOGLOBIN: 9.7 G/DL (ref 11.9–15.1)
IMMATURE GRANULOCYTES: 1 %
LYMPHOCYTES # BLD: 34 % (ref 24–44)
MCH RBC QN AUTO: 24.4 PG (ref 25.2–33.5)
MCHC RBC AUTO-ENTMCNC: 29.2 G/DL (ref 28.4–34.8)
MCV RBC AUTO: 83.6 FL (ref 82.6–102.9)
MONOCYTES # BLD: 9 % (ref 1–7)
MORPHOLOGY: ABNORMAL
NRBC AUTOMATED: 0 PER 100 WBC
PDW BLD-RTO: 24.2 % (ref 11.8–14.4)
PLATELET # BLD: 588 K/UL (ref 138–453)
PLATELET ESTIMATE: ABNORMAL
PMV BLD AUTO: 9.4 FL (ref 8.1–13.5)
POTASSIUM SERPL-SCNC: 3.7 MMOL/L (ref 3.7–5.3)
RBC # BLD: 3.97 M/UL (ref 3.95–5.11)
RBC # BLD: ABNORMAL 10*6/UL
SEG NEUTROPHILS: 48 % (ref 36–66)
SEGMENTED NEUTROPHILS ABSOLUTE COUNT: 4.13 K/UL (ref 1.8–7.7)
SODIUM BLD-SCNC: 139 MMOL/L (ref 135–144)
WBC # BLD: 8.6 K/UL (ref 3.5–11.3)
WBC # BLD: ABNORMAL 10*3/UL

## 2021-01-30 PROCEDURE — 80048 BASIC METABOLIC PNL TOTAL CA: CPT

## 2021-01-30 PROCEDURE — 6360000002 HC RX W HCPCS: Performed by: STUDENT IN AN ORGANIZED HEALTH CARE EDUCATION/TRAINING PROGRAM

## 2021-01-30 PROCEDURE — 6370000000 HC RX 637 (ALT 250 FOR IP): Performed by: INTERNAL MEDICINE

## 2021-01-30 PROCEDURE — 99232 SBSQ HOSP IP/OBS MODERATE 35: CPT | Performed by: INTERNAL MEDICINE

## 2021-01-30 PROCEDURE — 82947 ASSAY GLUCOSE BLOOD QUANT: CPT

## 2021-01-30 PROCEDURE — 1200000000 HC SEMI PRIVATE

## 2021-01-30 PROCEDURE — 2580000003 HC RX 258: Performed by: STUDENT IN AN ORGANIZED HEALTH CARE EDUCATION/TRAINING PROGRAM

## 2021-01-30 PROCEDURE — 6370000000 HC RX 637 (ALT 250 FOR IP): Performed by: STUDENT IN AN ORGANIZED HEALTH CARE EDUCATION/TRAINING PROGRAM

## 2021-01-30 PROCEDURE — 85025 COMPLETE CBC W/AUTO DIFF WBC: CPT

## 2021-01-30 PROCEDURE — 36415 COLL VENOUS BLD VENIPUNCTURE: CPT

## 2021-01-30 RX ORDER — LORAZEPAM 1 MG/1
1 TABLET ORAL EVERY 6 HOURS PRN
Status: DISCONTINUED | OUTPATIENT
Start: 2021-01-30 | End: 2021-01-31 | Stop reason: HOSPADM

## 2021-01-30 RX ORDER — OXYCODONE HYDROCHLORIDE AND ACETAMINOPHEN 5; 325 MG/1; MG/1
2 TABLET ORAL EVERY 6 HOURS PRN
Status: DISCONTINUED | OUTPATIENT
Start: 2021-01-30 | End: 2021-01-31 | Stop reason: HOSPADM

## 2021-01-30 RX ORDER — FENTANYL 25 UG/H
1 PATCH TRANSDERMAL
Status: DISCONTINUED | OUTPATIENT
Start: 2021-01-30 | End: 2021-01-30

## 2021-01-30 RX ADMIN — ASPIRIN 81 MG: 81 TABLET, CHEWABLE ORAL at 21:31

## 2021-01-30 RX ADMIN — APIXABAN 10 MG: 5 TABLET, FILM COATED ORAL at 21:31

## 2021-01-30 RX ADMIN — FENTANYL CITRATE 25 MCG: 50 INJECTION, SOLUTION INTRAMUSCULAR; INTRAVENOUS at 19:27

## 2021-01-30 RX ADMIN — OXYCODONE HYDROCHLORIDE AND ACETAMINOPHEN 2 TABLET: 5; 325 TABLET ORAL at 11:57

## 2021-01-30 RX ADMIN — FENTANYL CITRATE 25 MCG: 50 INJECTION, SOLUTION INTRAMUSCULAR; INTRAVENOUS at 01:01

## 2021-01-30 RX ADMIN — AMOXICILLIN AND CLAVULANATE POTASSIUM 1 TABLET: 875; 125 TABLET, FILM COATED ORAL at 09:03

## 2021-01-30 RX ADMIN — AMOXICILLIN AND CLAVULANATE POTASSIUM 1 TABLET: 875; 125 TABLET, FILM COATED ORAL at 21:31

## 2021-01-30 RX ADMIN — MORPHINE SULFATE 45 MG: 30 TABLET, EXTENDED RELEASE ORAL at 21:29

## 2021-01-30 RX ADMIN — SODIUM CHLORIDE, PRESERVATIVE FREE 10 ML: 5 INJECTION INTRAVENOUS at 09:11

## 2021-01-30 RX ADMIN — FENTANYL CITRATE 25 MCG: 50 INJECTION, SOLUTION INTRAMUSCULAR; INTRAVENOUS at 05:00

## 2021-01-30 RX ADMIN — OXYCODONE HYDROCHLORIDE AND ACETAMINOPHEN 1 TABLET: 5; 325 TABLET ORAL at 06:03

## 2021-01-30 RX ADMIN — LORAZEPAM 1 MG: 1 TABLET ORAL at 22:44

## 2021-01-30 RX ADMIN — APIXABAN 10 MG: 5 TABLET, FILM COATED ORAL at 09:03

## 2021-01-30 RX ADMIN — LISINOPRIL 2.5 MG: 2.5 TABLET ORAL at 09:03

## 2021-01-30 RX ADMIN — PANTOPRAZOLE SODIUM 40 MG: 40 TABLET, DELAYED RELEASE ORAL at 09:03

## 2021-01-30 RX ADMIN — LEVETIRACETAM 1500 MG: 500 TABLET, FILM COATED ORAL at 09:04

## 2021-01-30 RX ADMIN — FENTANYL CITRATE 25 MCG: 50 INJECTION, SOLUTION INTRAMUSCULAR; INTRAVENOUS at 09:11

## 2021-01-30 RX ADMIN — FENTANYL CITRATE 25 MCG: 50 INJECTION, SOLUTION INTRAMUSCULAR; INTRAVENOUS at 14:12

## 2021-01-30 RX ADMIN — FENTANYL CITRATE 25 MCG: 50 INJECTION, SOLUTION INTRAMUSCULAR; INTRAVENOUS at 03:01

## 2021-01-30 RX ADMIN — FENTANYL CITRATE 25 MCG: 50 INJECTION, SOLUTION INTRAMUSCULAR; INTRAVENOUS at 17:16

## 2021-01-30 RX ADMIN — SERTRALINE 50 MG: 50 TABLET, FILM COATED ORAL at 09:03

## 2021-01-30 RX ADMIN — MORPHINE SULFATE 30 MG: 30 TABLET, EXTENDED RELEASE ORAL at 09:04

## 2021-01-30 RX ADMIN — OXYCODONE HYDROCHLORIDE AND ACETAMINOPHEN 2 TABLET: 5; 325 TABLET ORAL at 17:57

## 2021-01-30 RX ADMIN — FENTANYL CITRATE 25 MCG: 50 INJECTION, SOLUTION INTRAMUSCULAR; INTRAVENOUS at 11:06

## 2021-01-30 RX ADMIN — PANTOPRAZOLE SODIUM 40 MG: 40 TABLET, DELAYED RELEASE ORAL at 21:31

## 2021-01-30 RX ADMIN — LEVETIRACETAM 1500 MG: 500 TABLET, FILM COATED ORAL at 21:29

## 2021-01-30 RX ADMIN — FENTANYL CITRATE 25 MCG: 50 INJECTION, SOLUTION INTRAMUSCULAR; INTRAVENOUS at 22:36

## 2021-01-30 RX ADMIN — TIZANIDINE 4 MG: 4 TABLET ORAL at 16:25

## 2021-01-30 RX ADMIN — ONDANSETRON 4 MG: 2 INJECTION INTRAMUSCULAR; INTRAVENOUS at 22:44

## 2021-01-30 RX ADMIN — SODIUM CHLORIDE, PRESERVATIVE FREE 10 ML: 5 INJECTION INTRAVENOUS at 21:32

## 2021-01-30 RX ADMIN — ONDANSETRON 4 MG: 2 INJECTION INTRAMUSCULAR; INTRAVENOUS at 06:29

## 2021-01-30 RX ADMIN — OXYCODONE HYDROCHLORIDE AND ACETAMINOPHEN 1 TABLET: 5; 325 TABLET ORAL at 00:04

## 2021-01-30 RX ADMIN — METOPROLOL SUCCINATE 25 MG: 25 TABLET, FILM COATED, EXTENDED RELEASE ORAL at 09:03

## 2021-01-30 RX ADMIN — Medication 10 ML: at 03:02

## 2021-01-30 RX ADMIN — FENTANYL CITRATE 25 MCG: 50 INJECTION, SOLUTION INTRAMUSCULAR; INTRAVENOUS at 07:05

## 2021-01-30 RX ADMIN — Medication 10 ML: at 07:05

## 2021-01-30 ASSESSMENT — PAIN DESCRIPTION - PROGRESSION: CLINICAL_PROGRESSION: NOT CHANGED

## 2021-01-30 ASSESSMENT — ENCOUNTER SYMPTOMS
STRIDOR: 0
VOMITING: 1
RHINORRHEA: 0
SORE THROAT: 0
COUGH: 0
SHORTNESS OF BREATH: 0
ABDOMINAL PAIN: 0
COLOR CHANGE: 0
CONSTIPATION: 0
BACK PAIN: 1
EYE REDNESS: 0
TROUBLE SWALLOWING: 0
NAUSEA: 1
WHEEZING: 0

## 2021-01-30 ASSESSMENT — PAIN SCALES - GENERAL
PAINLEVEL_OUTOF10: 7
PAINLEVEL_OUTOF10: 8
PAINLEVEL_OUTOF10: 8
PAINLEVEL_OUTOF10: 7
PAINLEVEL_OUTOF10: 8
PAINLEVEL_OUTOF10: 7
PAINLEVEL_OUTOF10: 7
PAINLEVEL_OUTOF10: 8
PAINLEVEL_OUTOF10: 8

## 2021-01-30 ASSESSMENT — PAIN DESCRIPTION - PAIN TYPE
TYPE: CHRONIC PAIN

## 2021-01-30 ASSESSMENT — PAIN SCALES - WONG BAKER
WONGBAKER_NUMERICALRESPONSE: 0

## 2021-01-30 ASSESSMENT — PAIN DESCRIPTION - FREQUENCY
FREQUENCY: CONTINUOUS

## 2021-01-30 NOTE — PLAN OF CARE
Problem: Pain:  Goal: Pain level will decrease  Description: Pain level will decrease  1/30/2021 1512 by Rodri Thomas RN  Outcome: Ongoing  1/30/2021 0419 by Narda Grubbs RN  Outcome: Ongoing  Goal: Control of acute pain  Description: Control of acute pain  1/30/2021 1512 by Rodri Thomas RN  Outcome: Ongoing  1/30/2021 0419 by Narda Grubbs RN  Outcome: Ongoing  Goal: Control of chronic pain  Description: Control of chronic pain  Outcome: Ongoing     Problem: Falls - Risk of:  Goal: Will remain free from falls  Description: Will remain free from falls  1/30/2021 1512 by Rodri Thomas RN  Outcome: Ongoing  1/30/2021 0419 by Narda Grubbs RN  Outcome: Ongoing  Goal: Absence of physical injury  Description: Absence of physical injury  1/30/2021 1512 by Rodri Thomas RN  Outcome: Ongoing  1/30/2021 0419 by Narda Grubbs RN  Outcome: Ongoing

## 2021-01-30 NOTE — PROGRESS NOTES
Today's Date: 1/30/2021  Patient Name: Rosa Maria Thompson  Date of admission: 1/28/2021  8:18 PM  Patient's age: 60 y.o., 1965  Admission Dx: Cancer, metastatic to bone [facility]) [C79.51]    Reason for Consult: management recommendations  Requesting Physician: Lucho Jamison MD    CHIEF COMPLAINT: Back pain. Abdominal pain. Metastatic ovarian cancer    INTERIM HISTORY  The patient is seen and evaluated. She continues to complain of pain. She requires fentanyl every 2 hours. She is on MS Contin and Percocet. We talked about adjusting the dose to 10 mg. We also talked about adding Ativan. HISTORY OF PRESENT ILLNESS:      The patient is a 60 y.o.  female who is admitted to the hospital for chief complaints of back pain and abdominal pain. Patient presented to the ER with chief complaints of back pain patient was recently discharged from the hospital on 21 January. She was admitted at that point with status epilepticus. Patient was also treated for splenic abscess for which she was treated with IV meropenem with pigtail catheter. Patient was discharged on Augmentin. Work-up done for evaluation of pulmonary pain and back pain showed metastatic lesion in the lumbar spine and decreased size of the splenic fluid. Patient has history of recurrent ovarian cancer originally diagnosed in the year 2005 with multiple recurrences. Patient has been treated with Doxil Avastin. Avastin was discontinued due to intra-abdominal bleeding. Patient also has history of seizure disorder. Work-up did not reveal any brain metastasis. Patient also recently had heart catheterization which did not show any critical stenosis. Patient was thought to be a candidate for anthracycline. Patient last chemotherapy was somewhere during October 2020. Past Medical History:   has a past medical history of Anemia, Bleeding, Cervical cancer ([facility] [address] Depression, Diabetes mellitus ([facility] [address] GERD (gastroesophageal reflux disease), Metastatic cancer ([facility] [address] Ovarian cancer ([facility] [address] Post chemo evaluation, and Splenic lesion. Past Surgical History:   has a past surgical history that includes Hysterectomy, total abdominal; Port Surgery; Tonsillectomy; IR PORT PLACEMENT > 5 YEARS (8/24/2020); Anus surgery; Abscess Drainage (2013); colectomy (03/2013); and IR EMBOLIZATION HEMORRHAGE (10/05/2020). Medications:    Prior to Admission medications    Medication Sig Start Date End Date Taking? Authorizing Provider   oxyCODONE-acetaminophen (PERCOCET) 5-325 MG per tablet TAKE 1 TO 2 TABLETS BY MOUTH EVERY 4 HOURS FOR 7 DAYS AS NEEDED . DO NOT EXCEED 8 PER 24 HOURS (IF NEEDED FOR BREAKTHROUGH PAIN) 12/24/20  Yes Historical Provider, MD   apixaban (ELIQUIS) 5 MG TABS tablet Take 10 mg by mouth 2 times daily 10 mg 2x daily for first 7 days then reduce to 5 mg 2x daily   Yes Historical Provider, MD   metFORMIN (GLUCOPHAGE-XR) 500 MG extended release tablet Take 2 tablets by mouth twice daily 1/25/21  Yes Macarena Silver MD   zolpidem (AMBIEN) 5 MG tablet Take 1 tablet by mouth nightly as needed for Sleep for up to 30 days. 1/15/21 2/14/21 Yes Macarena Silver MD   HYDROcodone-acetaminophen (NORCO) 5-325 MG per tablet Take 1 tablet by mouth every 6 hours as needed for Pain for up to 30 days.  1/15/21 2/14/21 Yes Macarena Silver MD   amoxicillin-clavulanate (AUGMENTIN) 875-125 MG per tablet Take 1 tablet by mouth every 12 hours pls do the blood test weekly - if the urine becomes dark or the right upper abd start being sore, pls call DR MARTÍNEZ 1/14/21 2/13/21 Yes Dennard Skiff, MD   lisinopril (PRINIVIL;ZESTRIL) 2.5 MG tablet Take 1 tablet by mouth daily 1/14/21  Yes Jarred Savage MD  morphine (MS CONTIN) extended release tablet 30 mg  30 mg Oral BID Kenneth Allen MD   30 mg at 01/30/21 0904    fentaNYL (SUBLIMAZE) injection 25 mcg  25 mcg Intravenous Q2H PRN Kenneth Allen MD   25 mcg at 01/30/21 0911    oxyCODONE-acetaminophen (PERCOCET) 5-325 MG per tablet 1 tablet  1 tablet Oral Q6H PRN Chanda Galloway MD   1 tablet at 01/30/21 0603    apixaban (ELIQUIS) tablet 10 mg  10 mg Oral BID Madelin Brito MD   10 mg at 01/30/21 0849    Followed by   Heydi Mccauley ON 2/3/2021] apixaban (ELIQUIS) tablet 5 mg  5 mg Oral BID Madelin Brito MD        sodium chloride flush 0.9 % injection 10 mL  10 mL Intravenous 2 times per day Janna Monroe DO   10 mL at 01/29/21 2156    sodium chloride flush 0.9 % injection 10 mL  10 mL Intravenous PRN Janna Monroe DO        aspirin chewable tablet 81 mg  81 mg Oral Nightly Nico Hui MD   81 mg at 01/29/21 2054    HYDROcodone-acetaminophen ([place]) 5-325 MG per tablet 1 tablet  1 tablet Oral Q6H PRN Nico Hui MD        levETIRAcetam (KEPPRA) tablet 1,500 mg  1,500 mg Oral BID Nico Hui MD   1,500 mg at 01/30/21 0904    lisinopril (PRINIVIL;ZESTRIL) tablet 2.5 mg  2.5 mg Oral Daily Nico Hui MD   2.5 mg at 01/30/21 3442    metoprolol succinate (TOPROL XL) extended release tablet 25 mg  25 mg Oral Daily Nico Hui MD   25 mg at 01/30/21 0903    pantoprazole (PROTONIX) tablet 40 mg  40 mg Oral BID Nico Hui MD   40 mg at 01/30/21 0903    sertraline (ZOLOFT) tablet 50 mg  50 mg Oral Daily Nico Hui MD   50 mg at 01/30/21 0903    tiZANidine (ZANAFLEX) tablet 4 mg  4 mg Oral Q6H PRN Nico Hui MD   4 mg at 01/29/21 2202    zolpidem (AMBIEN) tablet 5 mg  5 mg Oral Nightly PRN Nico Hui MD        sodium chloride flush 0.9 % injection 10 mL  10 mL Intravenous 2 times per day Nico Hui MD   10 mL at 01/30/21 0968  sodium chloride flush 0.9 % injection 10 mL  10 mL Intravenous PRN Dawood Echevarria MD   10 mL at 01/30/21 0705    ondansetron (ZOFRAN) injection 4 mg  4 mg Intravenous Q6H PRN Dawood Echevarria MD   4 mg at 01/30/21 6595    polyethylene glycol (GLYCOLAX) packet 17 g  17 g Oral Daily PRN Dwaood Echevarria MD        acetaminophen (TYLENOL) tablet 650 mg  650 mg Oral Q6H PRN Dawood Echevarria MD        Or    acetaminophen (TYLENOL) suppository 650 mg  650 mg Rectal Q6H PRN Dawood Echevarria MD           Allergies:  Ceftriaxone    Social History:   reports that she has quit smoking. She smoked 1.00 pack per day. She uses smokeless tobacco. She reports previous alcohol use. She reports that she does not use drugs. Family History: family history includes Alcohol Abuse in her mother; Cirrhosis in her mother. REVIEW OF SYSTEMS:      Constitutional: No fever or chills. No night sweats, today of weight loss   Eyes: No eye discharge, double vision, or eye pain   HEENT: negative for sore mouth, sore throat, hoarseness and voice change   Respiratory: negative for cough , sputum, dyspnea, wheezing, hemoptysis, chest pain   Cardiovascular: negative for chest pain, dyspnea, palpitations, orthopnea, PND   Gastrointestinal: Positive abdominal pain  Genitourinary: negative for frequency, dysuria, nocturia, urinary incontinence, and hematuria   Integument: negative for rash, skin lesions, bruises. Hematologic/Lymphatic: negative for easy bruising, bleeding, lymphadenopathy, or petechiae   Endocrine: negative for heat or cold intolerance,weight changes, change in bowel habits and hair loss   Musculoskeletal: Back pain.   Neurological: negative for headaches, dizziness, seizures, weakness, numbness    PHYSICAL EXAM:        BP (!) 110/55   Pulse 63   Temp 98.4 °F (36.9 °C) (Oral)   Resp 14   Ht 5' 5\" (1.651 m)   Wt 162 lb 7.7 oz (73.7 kg)   SpO2 96%   BMI 27.04 kg/m² Temp (24hrs), Av.5 °F (36.9 °C), Min:98.4 °F (36.9 °C), Max:98.6 °F (37 °C)      General appearance - well appearing, no in pain or distress   Mental status - alert and cooperative   Eyes - pupils equal and reactive, extraocular eye movements intact   Ears - bilateral TM's and external ear canals normal   Mouth - mucous membranes moist, pharynx normal without lesions   Neck - supple, no significant adenopathy   Lymphatics - no palpable lymphadenopathy, no hepatosplenomegaly   Chest - clear to auscultation, no wheezes, rales or rhonchi, symmetric air entry   Heart - normal rate, regular rhythm, normal S1, S2, no murmurs  Abdomen - soft, nontender, nondistended, no masses or organomegaly   Neurological - alert, oriented, normal speech, no focal findings or movement disorder noted   Musculoskeletal - no joint tenderness, deformity or swelling   Extremities - peripheral pulses normal, no pedal edema, no clubbing or cyanosis   Skin - normal coloration and turgor, no rashes, no suspicious skin lesions noted ,      DATA:      Labs:     Results for orders placed or performed during the hospital encounter of 21   Culture, Blood 1    Specimen: Blood   Result Value Ref Range    Specimen Description . BLOOD     Special Requests RT FA 15ML     Culture NO GROWTH 1 DAY    Culture, Blood 1    Specimen: Blood   Result Value Ref Range    Specimen Description . BLOOD     Special Requests RT HAND 15ML     Culture NO GROWTH 1 DAY    CBC Auto Differential   Result Value Ref Range    WBC 13.4 (H) 3.5 - 11.3 k/uL    RBC 4.30 3.95 - 5.11 m/uL    Hemoglobin 10.6 (L) 11.9 - 15.1 g/dL    Hematocrit 36.0 (L) 36.3 - 47.1 %    MCV 83.7 82.6 - 102.9 fL    MCH 24.7 (L) 25.2 - 33.5 pg    MCHC 29.4 28.4 - 34.8 g/dL    RDW 24.2 (H) 11.8 - 14.4 %    Platelets 385 (H) 787 - 453 k/uL    MPV 9.6 8.1 - 13.5 fL    NRBC Automated 0.0 0.0 per 100 WBC    Differential Type NOT REPORTED     WBC Morphology NOT REPORTED     RBC Morphology NOT REPORTED Platelet Estimate NOT REPORTED     Immature Granulocytes 0 0 %    Seg Neutrophils 72 (H) 36 - 65 %    Lymphocytes 18 (L) 24 - 43 %    Monocytes 6 3 - 12 %    Eosinophils % 3 1 - 4 %    Basophils 1 0 - 2 %    Absolute Immature Granulocyte 0.00 0.00 - 0.30 k/uL    Segs Absolute 9.66 (H) 1.50 - 8.10 k/uL    Absolute Lymph # 2.41 1.10 - 3.70 k/uL    Absolute Mono # 0.80 0.10 - 1.20 k/uL    Absolute Eos # 0.40 0.00 - 0.44 k/uL    Basophils Absolute 0.13 0.00 - 0.20 k/uL    Morphology ANISOCYTOSIS PRESENT    Lactic Acid, Plasma   Result Value Ref Range    Lactic Acid NOT REPORTED mmol/L    Lactic Acid, Whole Blood 1.5 0.7 - 2.1 mmol/L   Basic Metabolic Panel w/ Reflex to MG   Result Value Ref Range    Glucose 130 (H) 70 - 99 mg/dL    BUN 6 6 - 20 mg/dL    CREATININE 0.33 (L) 0.50 - 0.90 mg/dL    Bun/Cre Ratio NOT REPORTED 9 - 20    Calcium 9.2 8.6 - 10.4 mg/dL    Sodium 139 135 - 144 mmol/L    Potassium 3.8 3.7 - 5.3 mmol/L    Chloride 103 98 - 107 mmol/L    CO2 25 20 - 31 mmol/L    Anion Gap 11 9 - 17 mmol/L    GFR Non-African American >60 >60 mL/min    GFR African American >60 >60 mL/min    GFR Comment          GFR Staging NOT REPORTED    C-Reactive Protein   Result Value Ref Range    CRP 6.5 (H) 0.0 - 5.0 mg/L   Sedimentation Rate   Result Value Ref Range    Sed Rate 70 (H) 0 - 30 mm   HEPATIC FUNCTION PANEL   Result Value Ref Range    Albumin 4.1 3.5 - 5.2 g/dL    Alkaline Phosphatase 78 35 - 104 U/L    ALT 8 5 - 33 U/L    AST 12 <32 U/L    Total Bilirubin 0.26 (L) 0.3 - 1.2 mg/dL    Bilirubin, Direct <0.08 <0.31 mg/dL    Bilirubin, Indirect CANNOT BE CALCULATED 0.00 - 1.00 mg/dL    Total Protein 7.6 6.4 - 8.3 g/dL    Globulin NOT REPORTED 1.5 - 3.8 g/dL    Albumin/Globulin Ratio 1.2 1.0 - 2.5   Basic Metabolic Panel w/ Reflex to MG   Result Value Ref Range    Glucose 91 70 - 99 mg/dL    BUN 6 6 - 20 mg/dL    CREATININE 0.42 (L) 0.50 - 0.90 mg/dL    Bun/Cre Ratio NOT REPORTED 9 - 20    Calcium 9.3 8.6 - 10.4 mg/dL Sodium 139 135 - 144 mmol/L    Potassium 3.7 3.7 - 5.3 mmol/L    Chloride 104 98 - 107 mmol/L    CO2 23 20 - 31 mmol/L    Anion Gap 12 9 - 17 mmol/L    GFR Non-African American >60 >60 mL/min    GFR African American >60 >60 mL/min    GFR Comment          GFR Staging NOT REPORTED    CBC auto differential   Result Value Ref Range    WBC 8.6 3.5 - 11.3 k/uL    RBC 3.97 3.95 - 5.11 m/uL    Hemoglobin 9.7 (L) 11.9 - 15.1 g/dL    Hematocrit 33.2 (L) 36.3 - 47.1 %    MCV 83.6 82.6 - 102.9 fL    MCH 24.4 (L) 25.2 - 33.5 pg    MCHC 29.2 28.4 - 34.8 g/dL    RDW 24.2 (H) 11.8 - 14.4 %    Platelets 965 (H) 824 - 453 k/uL    MPV 9.4 8.1 - 13.5 fL    NRBC Automated 0.0 0.0 per 100 WBC    Differential Type NOT REPORTED     WBC Morphology NOT REPORTED     RBC Morphology NOT REPORTED     Platelet Estimate NOT REPORTED     Immature Granulocytes 1 (H) 0 %    Seg Neutrophils 48 36 - 66 %    Lymphocytes 34 24 - 44 %    Monocytes 9 (H) 1 - 7 %    Eosinophils % 7 (H) 1 - 4 %    Basophils 1 0 - 2 %    Absolute Immature Granulocyte 0.09 0.00 - 0.30 k/uL    Segs Absolute 4.13 1.8 - 7.7 k/uL    Absolute Lymph # 2.92 1.0 - 4.8 k/uL    Absolute Mono # 0.77 0.1 - 0.8 k/uL    Absolute Eos # 0.60 (H) 0.0 - 0.4 k/uL    Basophils Absolute 0.09 0.0 - 0.2 k/uL    Morphology ANISOCYTOSIS PRESENT    POC Glucose Fingerstick   Result Value Ref Range    POC Glucose 127 (H) 65 - 105 mg/dL   POC Glucose Fingerstick   Result Value Ref Range    POC Glucose 90 65 - 105 mg/dL   POC Glucose Fingerstick   Result Value Ref Range    POC Glucose 118 (H) 65 - 105 mg/dL   POC Glucose Fingerstick   Result Value Ref Range    POC Glucose 99 65 - 105 mg/dL   POC Glucose Fingerstick   Result Value Ref Range    POC Glucose 107 (H) 65 - 105 mg/dL         IMAGING DATA:    Echo Complete 2d W Doppler W Color    Result Date: 1/6/2021 Transthoracic Echocardiography Report (TTE)  Patient Name Chino Crabtree      Date of Study               01/06/2021               [address] [facility] [address] RUEDA   Date of      1965  Gender                      Female  Birth   Age          60 year(s)  Race                           Room Number  0522        Height:                     66 inch, 167.64 cm   Corporate ID [id number]    Weight:                     165 pounds, 74.8 kg  #   Patient Acct [de-identified]   BSA:          1.84 m^2      BMI:      26.63  #                                                              kg/m^2   MR #         [id number]     Sonographer                 Ju Fulton   Accession #  [id number]  Interpreting Physician      [address] [place] [address]   Fellow                   Referring Nurse                           Practitioner   Interpreting             Referring Physician         Marcie Mendoza  Type of Study   TTE procedure:2D Echocardiogram, M-Mode, Doppler, Color Doppler. Procedure Date Date: 01/06/2021 Start: 10:20 AM Study Location: [facility] Technical Quality: Good visualization Indications:Seizure. History / Tech. Comments: Procedure explained to patient. Study done bedside in Neuro ICU. Evaluate cardiac function Patient Status: Inpatient Height: 66 inches Weight: 165 pounds BSA: 1.84 m^2 BMI: 26.63 kg/m^2 CONCLUSIONS Summary Left ventricle is normal in size. Global left ventricular systolic function is mild to moderately reduced. Estimated ejection fraction is 40 % . The apex and apical segments appear severely hypokinetic. The basal segments appear to be functioning normal. Abnormal global L. strain of -11 %. Mild left ventricular hypertrophy. Grade I (mild) left ventricular diastolic dysfunction. Mild aortic insufficiency. Trivial mitral regurgitation. Trivial tricuspid regurgitation. Small to mild circumferential pericardial effusion, with no echocardiographic tamponade.  Signature LVIDs: 3.2 cm(2.2 - 4.0 cm)       Systolic DBFUXU:99.83 ml  IVSd:1.1 cm(0.6 - 1.1 cm)        Aortic Root:2.3 cm(2.0 - 3.7 cm)  LVPWd:1.1 cm(0.6 - 1.1 cm)       LA Dimension: 3.8 cm(1.9 - 4.0 cm)  Fractional Shortenin.69 %    LA volume/Index: 51.8 ml /28m^2  Calculated LVEF (%): 40.44 %     LVOT:1.9 cm                                   RVDd:2.7 cm   Mitral:                                 Aortic   Valve Area (P1/2-Time): 4.07 cm^2       Peak Velocity: 1.44 m/s  Peak E-Wave: 0.86 m/s                   Mean Velocity: 1.02 m/s  Peak A-Wave: 1.11 m/s                   Peak Gradient: 8.29 mmHg  E/A Ratio: 0.77                         Mean Gradient: 6 mmHg  Peak Gradient: 2.94 mmHg  Mean Gradient: 2 mmHg  Deceleration Time: 169 msec             Area (continuity): 1.96 cm^2  P1/2t: 54 msec                          AV VTI: 32 cm   Area (continuity): 2.26 cm^2  Mean Velocity: 0.68 m/s                                           Pulmonic:                                           Peak Velocity: 0.88 m/s                                          Peak Gradient: 3.11 mmHg  Diastology / Tissue Doppler Septal Wall E' velocity:0.05 m/s Septal Wall E/E':16.8 Lateral Wall E' velocity:0.05 m/s Lateral Wall E/E':17.1    Ct Head Wo Contrast    Result Date: 2021 EXAMINATION: CT OF THE HEAD WITHOUT CONTRAST  1/4/2021 9:59 am TECHNIQUE: CT of the head was performed without the administration of intravenous contrast. Dose modulation, iterative reconstruction, and/or weight based adjustment of the mA/kV was utilized to reduce the radiation dose to as low as reasonably achievable. COMPARISON: 08/20/2020, 08/04/2020 HISTORY: ORDERING SYSTEM PROVIDED HISTORY: seizure TECHNOLOGIST PROVIDED HISTORY: seizure Reason for Exam: Seizure, LOC. Pt is intubated. Hx of ovarian CA. Acuity: Acute Type of Exam: Initial Relevant Medical/Surgical History: ovarian CA, DM FINDINGS: BRAIN/VENTRICLES: There is no acute intracranial hemorrhage, mass effect or midline shift. No abnormal extra-axial fluid collection. The gray-white differentiation is maintained without evidence of an acute infarct. Mild scattered regions of low attenuation within the periventricular, subcortical, and deep white matter, presumably sequelae of chronic microangiopathic ischemic white matter change, though ultimately age indeterminate in the absence of prior comparison. Mild volume loss with commensurate ventricular and sulcal prominence. There is no evidence of hydrocephalus. ORBITS: The visualized portion of the orbits demonstrate no acute abnormality. SINUSES: There are air-fluid levels with aerated secretions within the sphenoid sinuses and ethmoid air cells on a background of mild mucosal thickening. Small retention cysts are present within the partially imaged maxillary sinuses. Mastoid air cells are clear. SOFT TISSUES/SKULL:  Heterogeneous mineralization of the calvarium, clivus, and partially imaged upper cervical spine. No fracture. Superficial scalp soft tissues are without focal abnormality. Endotracheal and enteric tubes traverse the nasopharynx and oropharynx, only partially imaged. No acute intracranial hemorrhage, mass-effect, midline shift, or abnormal extra-axial collection. In the setting of known malignant neoplasm with seizure, contrast-enhanced brain MRI is recommended to further evaluate for intracranial metastatic disease. Minimal regions of low attenuation within the periventricular, subcortical, and deep white matter, presumably sequelae of chronic microangiopathic ischemic white matter change, though ultimately age indeterminate without prior comparison imaging. Recommend attention on follow-up. Heterogeneous mineralization is suggested within the calvarium, clivus, and partially imaged upper cervical spine, suspicious for osseous metastatic disease in the setting of a known primary neoplasm. Attention on follow-up MRI. Air-fluid levels with aerated secretions in the sphenoid sinuses and ethmoid air cells, which may relate to intubated status versus underlying pre-existing acute sinusitis.      Ct Abdomen Pelvis W Iv Contrast Additional Contrast? None    Result Date: 1/28/2021 EXAMINATION: CT OF THE ABDOMEN AND PELVIS WITH CONTRAST 1/28/2021 9:09 pm TECHNIQUE: CT of the abdomen and pelvis was performed with the administration of intravenous contrast. Multiplanar reformatted images are provided for review. Dose modulation, iterative reconstruction, and/or weight based adjustment of the mA/kV was utilized to reduce the radiation dose to as low as reasonably achievable. COMPARISON: 01/05/2021 HISTORY: ORDERING SYSTEM PROVIDED HISTORY: hx splenic abscess, n/v TECHNOLOGIST PROVIDED HISTORY: hx splenic abscess, n/v Reason for Exam: BACK PAIN, NAUSEA, HX OVARIAN CANCER Acuity: Acute Type of Exam: Initial FINDINGS: Lower Chest: The heart is enlarged with moderate pericardial effusion. Coronary artery atherosclerosis. Partially calcified soft tissue nodules within the right lower chest are unchanged. Small left pleural effusion with adjacent consolidation. The effusion is slightly decreased in the interval. There are innumerable pulmonary nodules. Organs: No acute abnormality within the liver. Cholelithiasis without pericholecystic fluid. Pigtail catheter is again noted within the spleen with decrease in size of the splenic fluid collection/abscess. Dystrophic calcifications within the spleen are again noted. Evaluation of the pancreas is limited due to streak artifact. No acute abnormality of the adrenal glands or kidneys. GI/Bowel: Small hiatal hernia. The stomach is partially distended. The small bowel is nondilated. The colon is nondilated with evidence of prior partial colonic resection. The appendix is normal in caliber. Pelvis: The bladder is partially distended without vesicular stone. Prior hysterectomy. Peritoneum/Retroperitoneum: No ascites or pneumoperitoneum. Atherosclerosis of the nondilated abdominal aorta. Left retroperitoneal lymph node has decreased in size in the interval, now measuring 2.8 x 2.3 cm (previously 3.1 x 2.6 cm).   The right retroperitoneal lymph node measures slightly larger at 4.7 x 1.8 cm (previously 3.6 x 1.6 cm). The midline pelvic lymph node is increased in size, now measuring 1.2 cm. The right pelvic soft tissue lesion is stable to minimally increased in size at 4.0 x 2.4 cm left iliac node is increased in size, now measuring 4.3 x 2.0 cm (previously 3.2 x 1.7 cm). Percutaneous catheter terminating in the left pelvis is unchanged. Bones/Soft Tissues: Sclerotic osseous lesions are consistent with metastases. 1. Decrease in size of the splenic fluid collection/abscess. Pigtail catheter remains in place. 2. Slight decrease in size of the left pleural effusion with persistent moderate pericardial effusion. 3. Multiple abdominal and pelvic lymph nodes are again noted, some are increased in size while others have decreased. 4. Osseous and pulmonary metastases are again noted. 5. Cholelithiasis without CT evidence of cholecystitis. Xr Chest Portable    Result Date: 1/13/2021  EXAMINATION: ONE X-RAY VIEW OF THE CHEST 1/13/2021 8:21 am COMPARISON: 01/07/2020 HISTORY: ORDERING SYSTEM PROVIDED HISTORY:  Follow up after thoracocenthesis TECHNOLOGIST PROVIDED HISTORY: Follow up after thoracocenthesis Reason for Exam: Uprt port chest FINDINGS: No convincing evidence of pneumothorax status post left thoracentesis. Right jugular chest port remains in place. Stable heart size with mild perihilar vascular prominence and indistinctness of pulmonary vessels suggesting mild perihilar edema, overall improved since the prior study. Faintly visualized drainage tube in the left upper quadrant of the abdomen. Small lung nodules are better shown on the recent CT. There is right hilar prominence/adenopathy. Mild left basilar opacity due to residual fluid and atelectasis. No acute osseous abnormality within limits of the exam.     No evidence of pneumothorax status post left thoracentesis.      Xr Chest Portable    Result Date: 1/7/2021 EXAMINATION: ONE XRAY VIEW OF THE CHEST 1/7/2021 6:31 am COMPARISON: January 6, 2021, chest examination HISTORY: ORDERING SYSTEM PROVIDED HISTORY: Intubated, eval for acute findings TECHNOLOGIST PROVIDED HISTORY: Intubated, eval for acute findings FINDINGS: Interval removal of endotracheal, and nasogastric tubes Stable right IJ port catheter/cardiac silhouette Expiratory exam with interval progression in now moderate diffuse prominence/indistinctness of the pulmonary vascularity/interstitial markings. Stable moderate left basilar opacity slight interval increase in mild streaky right basilar density     Support lines, as detailed Interval progression in now moderate vascular congestion Stable moderate left basilar opacity related to combined pleural-parenchymal process with slight progression in mild streaky right basilar atelectasis     Xr Chest Portable    Result Date: 1/6/2021  EXAMINATION: ONE XRAY VIEW OF THE CHEST 1/6/2021 6:28 am COMPARISON: 01/05/2021 HISTORY: ORDERING SYSTEM PROVIDED HISTORY: Intubated, eval for acute findings TECHNOLOGIST PROVIDED HISTORY: Intubated, eval for acute findings Reason for Exam: supine port Acuity: Unknown Type of Exam: Ongoing FINDINGS: Endotracheal tube terminates 4.7 cm above the alejandro. Enteric tube courses below left hemidiaphragm. Cardiac silhouette is mildly enlarged. Left basilar chest tube remains in essentially unchanged positioning. Stable appearing left-sided pleural effusion versus pleural/parenchymal scarring. Right basilar atelectasis/scarring. Vascular port terminates overlying the expected location of the SVC. Overall stable examination. Cardiomegaly and pulmonary vascular congestion. Small left pleural effusion versus pleural scarring.      Xr Chest Portable    Result Date: 1/5/2021 EXAMINATION: ONE XRAY VIEW OF THE CHEST 1/5/2021 6:11 am COMPARISON: January 4, 2021, chest exam HISTORY: ORDERING SYSTEM PROVIDED HISTORY: Intubated, eval for acute findings TECHNOLOGIST PROVIDED HISTORY: Intubated, eval for acute findings Reason for Exam: supine port Acuity: Unknown Type of Exam: Ongoing FINDINGS: Stable right IJ port catheter, endotracheal, nasogastric tubes and left basilar pigtail catheter without definite pneumothorax Stable cardiac silhouette Persistent mild streaky right basilar atelectasis and moderate left perihilar/left basilar opacity with possible mild vascular congestion     Stable chest Stable support lines Possible mild edema with moderate left perihilar, left basilar opacity related to combined pleuroparenchymal process     Xr Chest Portable    Result Date: 1/4/2021  EXAMINATION: ONE XRAY VIEW OF THE CHEST 1/4/2021 5:02 pm COMPARISON: January 4, 2021 HISTORY: ORDERING SYSTEM PROVIDED HISTORY: evaluate ETT position TECHNOLOGIST PROVIDED HISTORY: evaluate ETT position Reason for Exam: ett position FINDINGS: ET and enteric tubes unchanged. ET tube appears to terminate 42 mm above the alejandro. Right IJ MediPort unchanged. Small left effusion. Mild edema. Pigtail catheter left lung base. Metallic coils left upper quadrant of the abdomen. Heart and mediastinum normal.  Bony thorax intact. Subsegmental atelectasis right base. ET tube as above. Life support apparatus otherwise stable. Mild-to-moderate edema and small left effusion unchanged.      Xr Chest Portable    Result Date: 1/4/2021 EXAMINATION: ONE XRAY VIEW OF THE CHEST 1/4/2021 11:38 am COMPARISON: 10/24/2020 HISTORY: ORDERING SYSTEM PROVIDED HISTORY: post intubation TECHNOLOGIST PROVIDED HISTORY: post intubation Reason for Exam: LOC. NG tube & ET tube placed Acuity: Acute Type of Exam: Initial FINDINGS: Endotracheal tube is in place with its tip terminating approximately 4.4 cm above the alejandro. Enteric tube courses below the left hemidiaphragm. A left basilar pigtail chest tube catheter noted in place. There is a residual small to moderate-sized left-sided effusion with adjacent airspace disease. Right hemithorax is clear. Vascular port terminates overlying the expected location of the SVC. No convincing evidence for pneumothorax. Vascular coiling identified within the upper abdomen. Small to moderate left pleural effusion with adjacent airspace disease. Left-sided chest tube terminates overlying the left lung base. Endotracheal and enteric tubes as above. Us Thoracentesis Which Side Should The Procedure Be Performed?  Left    Result Date: 1/13/2021 PROCEDURE: ULTRASOUNDGUIDED left THORACENTESIS 1/13/2021 HISTORY: Left pleural effusion TECHNIQUE: This procedure was performed by Elvi Rodriguez PA-C under indirect supervision of Dr. Deb Cohen. While the patient was seated upright, after localizing, marking and anesthetizing the posterior left lower chest with one percent lidocaine, a 5 [employer] Linda Yueh needle was advanced under ultrasound guidance. Some sonogram image demonstrate safe tract access. The catheter was advanced and the needle was removed. The catheter was connected to a Vacutainer bottle and 500 mL of clear yellow fluidwas drained. Postprocedural ultrasound demonstrated minimal residual fluid. The catheter was removed and the site was dressed appropriately. A sample was sent for laboratory analysis. Estimated blood loss was minimum. The patient tolerated the procedure well and left the Department in stable condition. FINDINGS: A total of 500 mL of clear yellow fluid was removed. Successful ultrasound guided thoracentesis. Xr Abdomen For Ng/og/ne Tube Placement    Result Date: 1/4/2021  EXAMINATION: ONE SUPINE XRAY VIEW(S) OF THE ABDOMEN 1/4/2021 10:28 pm COMPARISON: None. HISTORY: ORDERING SYSTEM PROVIDED HISTORY: Confirmation of course of NG/OG/NE tube and location of tip of tube TECHNOLOGIST PROVIDED HISTORY: Confirmation of course of NG/OG/NE tube and location of tip of tube Portable? ->Yes Reason for Exam: supine FINDINGS: Enteric tube in the stomach. Tubing left lower quadrant of the abdomen requires clinical correlation. 38 x 44 mm a radiodensity right lower quadrant. Nonspecific bowel gas pattern. Osseous structures normal.     Enteric tube in the stomach. Other incidental findings as above.      Ct Chest Abdomen Pelvis W Contrast    Result Date: 1/5/2021 EXAMINATION: CT OF THE CHEST, ABDOMEN, AND PELVIS WITH CONTRAST 1/5/2021 3:42 pm TECHNIQUE: CT of the chest, abdomen and pelvis was performed with the administration of intravenous contrast. Multiplanar reformatted images are provided for review. Dose modulation, iterative reconstruction, and/or weight based adjustment of the mA/kV was utilized to reduce the radiation dose to as low as reasonably achievable. COMPARISON: PET-CT August 20, 2020 HISTORY: ORDERING SYSTEM PROVIDED HISTORY: concern for worsening splenic abscess TECHNOLOGIST PROVIDED HISTORY: concern for worsening splenic abscess Reason for Exam: concern for worsening splenic abscess Acuity: Unknown Type of Exam: Unknown FINDINGS: Chest: Mediastinum: ET and enteric tubes noted in appropriate position. Right internal jugular catheter terminates in the right atrium. Small pericardial effusion. Calcific coronary artery disease. Heart and great vessels otherwise unremarkable. Prominent right hilar lymphadenopathy noted measuring up to 17 mm. Mediastinal lymphadenopathy measuring up to 9 x 14 mm in the prevascular space. Lungs/pleura: Stann Cassis Moderate left effusion and left lower lobe airspace disease. 5 mm pulmonary nodule left upper lobe image number 41 3 mm pulmonary nodule left upper lobe image number 43 another 3 mm nodule noted on image 46. At least 3 pulmonary nodules noted on image 57 and 58 measuring up to 5 mm. 3 mm nodule noted near the fissure right lower lobe image 41.  3 mm nodule right upper lobe image number 27 2 mm nodule right upper lobe image number 49 3 by-4 mm nodule right middle lobe image number 57 5 mm nodule right middle lobe image 63. Multiple pulmonary nodules noted at the right lung base measuring up to 4 to 5 mm. Calcified mass anterior sulcus on the right appears stable measuring 14 x 26 mm. Soft Tissues/Bones: T11 and T12 vertebral body sclerotic lesions.  Abdomen/Pelvis: Organs: Percutaneous catheter left anterior abdominal wall terminates in the pelvis. The liver,, pancreas, and adrenals appear normal.  There appears to be a left upper quadrant pigtail catheter which is new. Splenic complex cystic mass possibly with coarse calcifications is difficult to assess due to beam hardening artifact from local metallic coils. Oral contrast is noted in the stomach and small bowel. Large gallstone. Kidneys appear normal. Decompressed with a Mercado catheter. GI/Bowel: The stomach,small bowel, and colon appear normal. There is increased stool throughout the colon. Oral contrast is noted in the small bowel and colon and stomach. Pelvis: There appear to be multiple new mesenteric nodules in the pelvis measuring up to 24 x 38 mm prominent left inguinal lymphadenopathy is noted. Left pelvic sidewall lymphadenopathy appears unchanged. Confluency masses measure up to 17 x 32 mm. Peritoneum/Retroperitoneum: Prominent heterogeneous confluency retroperitoneal lymphadenopathy measures up to 26 x 31 mm on the left and 16 x 36 mm on the right. Bones/Soft Tissues: Sclerotic lesions noted L1, L5 and right ischium which appear more conspicuous. The largest measures 8 mm in the right ischium. Interval placement of a percutaneous pigtail catheter left splenic complex mass. Mesenteric, retroperitoneal, and left inguinal pathologic lymphadenopathy consistent with metastatic disease. Multiple pulmonary nodules consistent with metastatic disease. Prominent right hilar lymph node. New left lower lobe consolidation and effusion. Diffuse bony blastic metastatic disease.      Mri Brain W Wo Contrast    Result Date: 1/5/2021 EXAMINATION: MRI OF THE BRAIN WITHOUT AND WITH CONTRAST  1/5/2021 11:23 am TECHNIQUE: Multiplanar multisequence MRI of the head/brain was performed without and with the administration of intravenous contrast. COMPARISON: Head CT 01/04/2021 HISTORY: ORDERING SYSTEM PROVIDED HISTORY: seizure, history of metastatic cancer TECHNOLOGIST PROVIDED HISTORY: seizure, history of metastatic cancer Reason for Exam: seizure, hx of mets FINDINGS: INTRACRANIAL STRUCTURES/VENTRICLES: Motion degraded examination. No acute infarction. No mass effect or midline shift. No acute intracranial hemorrhage. Mild parenchymal volume loss with enlargement of the ventricles and cerebral sulci. Minimal periventricular and subcortical white matter T2/FLAIR hyperintense signal.  The sellar/suprasellar regions appear unremarkable. The normal signal voids within the major intracranial vessels appear maintained. No abnormal focus of enhancement is seen within the brain. Coronal T2 and FLAIR sequences demonstrate symmetric signal intensity and volume in the hippocampi bilaterally. ORBITS: The visualized portion of the orbits demonstrate no acute abnormality. SINUSES: Mucosal thickening and fluid in the paranasal sinuses. Fluid in the mastoid air cells. BONES/SOFT TISSUES: The bone marrow signal intensity appears normal. The soft tissues demonstrate no acute abnormality. 1. No acute intracranial abnormality. Specifically, no acute infarction, mesial temporal sclerosis, or intracranial metastatic disease. 2. Mild parenchymal volume loss. Sequela of mild chronic microvascular ischemic changes.      Ct Lumbar Spine Trauma Reconstruction    Result Date: 1/28/2021 EXAMINATION: CT OF THE LUMBAR SPINE WITHOUT CONTRAST  1/28/2021 TECHNIQUE: CT of the lumbar spine was performed without the administration of intravenous contrast. Multiplanar reformatted images are provided for review. Dose modulation, iterative reconstruction, and/or weight based adjustment of the mA/kV was utilized to reduce the radiation dose to as low as reasonably achievable. COMPARISON: None HISTORY: ORDERING SYSTEM PROVIDED HISTORY: METASTASES TECHNOLOGIST PROVIDED HISTORY: metastatic ovarian cancer, concern for lumbosacral fx Reason for Exam: BACK PAIN, R/O METS FINDINGS: BONES/ALIGNMENT: There are 5 non-rib-bearing, lumbar type vertebral bodies. There is sacralization of L5. There are sclerotic lesions within the T11, T12, L1, L2, and L4 vertebral bodies. There is also a sclerotic lesion in the right iliac bone. Vertebral body heights are maintained. No displaced fracture. No significant listhesis. DEGENERATIVE CHANGES: There is mild intervertebral disc height loss at L4-5. SOFT TISSUES/RETROPERITONEUM: Soft tissue findings are dictated separately. Sclerotic osseous metastatic disease within the lumbar spine and pelvis. No pathologic fracture identified. IMPRESSION:   Primary Problem  Cancer, metastatic to bone [facility])    Active Hospital Problems    Diagnosis Date Noted    Intractable low back pain [M54.5]     Cancer, metastatic to bone ([address] [C79.51] 01/28/2021    Essential hypertension [I10] 01/28/2021    Pleural effusion [J90] 01/13/2021    Anemia [D64.9]     Splenic abscess [D73.3]     Ovarian cancer ([address] [C56.9]     Seizure ([address] [R56.9] 10/21/2020    Malignant neoplasm of ovary ([address] [C56.9] 08/17/2020           RECOMMENDATIONS:  1. Metastatic ovarian cancer:    2. Intractable low back pain :  Adjusting MS Contin to 30 mg and Percocet to 10/325 for breakthrough  Adding Ativan for anxiety  3. Splenic abscess:   4.  Seizure disorder: I would suggest to keep her overnight to clinic try to control her pain as she is on a lot of IV fentanyl. Okay to discharge tomorrow if Pain is  controlled with p.o. medication              This note is created with the assistance of a speech recognition program.  While intending to generate a document that actually reflects the content of the visit, the document can still have some errors including those of syntax and sound a like substitutions which may escape proof reading. It such instances, actual meaning can be extrapolated by contextual diversion.

## 2021-01-30 NOTE — PROGRESS NOTES
[facility] - Initial Consult Note  Today's Date and Time: 1/30/2021, 1:22 PM    Chief complaint/reason for consultation:   · Splenic abscess with pig tail catheter drainage   · Recent admission requiring IV Meropenem     Impression :   · Splenic abscess smaller- pig tail drainage - augmentin  · Pleural effusion  · Metastatic ovarian carcinoma   · Large retroperiton LN  · Osteolytic lumbar spine lesions  · Status Epilepticus  · Blood clot   · Type 2 Diabetes Mellitus     Recommendations:   · Continue Augmentin for splenic abscess  · Monitor LFTs - AST/ALT WNL   · Oncology on board - pain control and outpatient follow up   · Outpatient ID Follow up     Infection Control Recommendations   · Grant Precautions    Antimicrobial Stewardship Recommendations   · Simplification of therapy  · Targeted therapy  · IV to oral conversion    Coordination of Outpatient Care:   · Estimated Length of IV antimicrobials:  · Patient will need Midline Catheter Insertion:   · Patient will need PICC line Insertion:    History of Present Illness:   Marianna Montes De Oca is a 60y.o.-year-old with a past medical history of metastatic ovarian carcinoma, splenic abscess, pleural effusion, and type 2 diabetes mellitus presented with neck pain of acute onset. She was most recently discharged from the hospital on 1/14/2021 after being admitted due to status epilepticus most likely secondary to her metastatic ovarian carcinoma. During that admission she developed pleural effusion secondary to the splenic abscess and was treated with IV meropenem. Due to the expenses she was discharged on augmentin as she could not afford the meropenem. She also complained of back pain radiating to her thigh in the ED. Ct of lumbar spine as well as CT of abdomen were performed and they were positive for metastatic lesions to the lumbar spine and pelvis with a decrease in size of the splenic abscess. Interval History   No acute episodes overnight  Patient is heme stable and afebrile  Continue augmentin   AST 12   ALT 8  Oncology evaluated - pain control and outpatient follow up     Labs, X rays reviewed: 1/30/2021  WBC: 13.4 - 8.6  Hb: 10.6 - 9.7  Plat: 628 - 588  BUN: 6 - 6  Cr: 0.33 - 0.42  CRP 6.5   ESR 70    Cultures:  Blood:  · Blood culture from 1/28 x 2 no growth 1 day    Imaging:  CT A+P with IV contrast from 1/28  1. Decrease in size of the splenic fluid collection/abscess. Pigtail catheter remains in place. 2. Slight decrease in size of the left pleural effusion with persistent moderate pericardial effusion. 3. Multiple abdominal and pelvic lymph nodes are again noted, some are increased in size while others have decreased. 4. Osseous and pulmonary metastases are again noted. 5. Cholelithiasis without CT evidence of cholecystitis. I have personally reviewed the past medical history, past surgical history, medications, social history, and family history, and I have updated the database accordingly. Past Medical History:     Past Medical History:   Diagnosis Date    Anemia     Bleeding 10/2020    intra-abdominal bleeding -due to splenic mass with GI infiltration. Status post embolization    Cervical cancer ([address]     Depression     Diabetes mellitus ([address]     GERD (gastroesophageal reflux disease)     Metastatic cancer ([address] 10/2020    extensive intraabdominal and splenic involvement and lung mets.     Ovarian cancer ([address]     low grade serous ovarian carcinoma    Post chemo evaluation     2007: Chemo via med onc (Dr. Renu Dwyer), 2008: Tanika Door due to rising CA-125, 2013: intraperitoneal chemo,12/2015: Ca125 - 25     Splenic lesion      Past Surgical  History:     Past Surgical History:   Procedure Laterality Date    ABSCESS DRAINAGE  2013    Franca rectal    ANUS SURGERY      ANAL FISSURECTOMY    COLECTOMY  03/2013 ex lap, tumor debulking, transverse colectomy w reanastamosis, subgastric omentectomy, intraperitoneal port placement    HYSTERECTOMY, TOTAL ABDOMINAL      IR EMBOLIZATION HEMORRHAGE  10/05/2020    intra-abdominal bleeding -due to splenic mass with GI infiltration. Status post embolization [employer] interlock coils x7. mri condtional 3t ok, safe immediately post implant.     IR PORT PLACEMENT EQUAL OR GREATER THAN 5 YEARS  8/24/2020    IR PORT PLACEMENT EQUAL OR GREATER THAN 5 YEARS 8/24/2020 Yvette Vazquez MD [facility]    PORT SURGERY      IP Port    TONSILLECTOMY       Medications:      amoxicillin-clavulanate  1 tablet Oral 2 times per day    insulin lispro  0-18 Units Subcutaneous TID WC    insulin lispro  0-9 Units Subcutaneous Nightly    morphine  30 mg Oral BID    apixaban  10 mg Oral BID    Followed by   Christiano Dahl ON 2/3/2021] apixaban  5 mg Oral BID    sodium chloride flush  10 mL Intravenous 2 times per day    aspirin  81 mg Oral Nightly    levETIRAcetam  1,500 mg Oral BID    lisinopril  2.5 mg Oral Daily    metoprolol succinate  25 mg Oral Daily    pantoprazole  40 mg Oral BID    sertraline  50 mg Oral Daily    sodium chloride flush  10 mL Intravenous 2 times per day     Social History:     Social History     Socioeconomic History    Marital status: Single     Spouse name: Not on file    Number of children: Not on file    Years of education: Not on file    Highest education level: Not on file   Occupational History    Not on file   Social Needs    Financial resource strain: Not on file    Food insecurity     Worry: Not on file     Inability: Not on file    Transportation needs     Medical: Not on file     Non-medical: Not on file   Tobacco Use    Smoking status: Former Smoker     Packs/day: 1.00    Smokeless tobacco: Current User   Substance and Sexual Activity    Alcohol use: Not Currently    Drug use: Never    Sexual activity: Not on file Lifestyle    Physical activity     Days per week: Not on file     Minutes per session: Not on file    Stress: Not on file   Relationships    Social connections     Talks on phone: Not on file     Gets together: Not on file     Attends Hinduism service: Not on file     Active member of club or organization: Not on file     Attends meetings of clubs or organizations: Not on file     Relationship status: Not on file    Intimate partner violence     Fear of current or ex partner: Not on file     Emotionally abused: Not on file     Physically abused: Not on file     Forced sexual activity: Not on file   Other Topics Concern    Not on file   Social History Narrative    Not on file     Family History:     Family History   Problem Relation Age of Onset    Alcohol Abuse Mother     Cirrhosis Mother       Allergies:   Ceftriaxone     Review of Systems:   Review of Systems   Constitutional: Negative for appetite change, chills, fatigue, fever and unexpected weight change. HENT: Negative for congestion, rhinorrhea, sore throat and trouble swallowing. Eyes: Negative for redness. Respiratory: Negative for cough, shortness of breath, wheezing and stridor. Cardiovascular: Negative for chest pain, palpitations and leg swelling. Gastrointestinal: Positive for nausea and vomiting. Negative for abdominal pain and constipation. Endocrine: Negative for polyphagia and polyuria. Genitourinary: Positive for flank pain. Negative for dysuria, frequency and urgency. Musculoskeletal: Positive for back pain and neck pain. Negative for arthralgias and myalgias. Skin: Negative for color change, pallor, rash and wound. Allergic/Immunologic: Positive for immunocompromised state. Neurological: Negative for dizziness, tremors, light-headedness and headaches. Hematological: Negative for adenopathy. Psychiatric/Behavioral: Negative for confusion. The patient is not nervous/anxious.       Physical Examination : Patient Vitals for the past 8 hrs:   BP Temp Temp src Pulse Resp SpO2   01/30/21 1311 111/63   94 14 96 %   01/30/21 0900 (!) 110/55        01/30/21 0737  98.4 °F (36.9 °C) Oral 63 14 96 %     Physical Exam   Constitutional: She is oriented to person, place, and time. She appears well-developed and well-nourished. No distress. HENT:   Head: Normocephalic and atraumatic. Eyes: Conjunctivae are normal. No scleral icterus. Neck: Neck supple. No tracheal deviation present. Cardiovascular: Exam reveals no gallop and no friction rub. No murmur heard. Pulmonary/Chest: Effort normal and breath sounds normal. No respiratory distress. She has no wheezes. She has no rales. She exhibits no tenderness. Abdominal: Soft. Bowel sounds are normal. She exhibits no distension and no mass. There is no abdominal tenderness. There is no rebound and no guarding. Genitourinary:    Genitourinary Comments: No wright     Musculoskeletal:         General: No tenderness, deformity or edema. Neurological: She is oriented to person, place, and time. No cranial nerve deficit. Coordination normal.   Skin: Skin is dry. No rash noted. She is not diaphoretic. No erythema. No pallor. Psychiatric: She has a normal mood and affect.  Her behavior is normal. Judgment and thought content normal.      Medical Decision Making -Laboratory:   I have independently reviewed/ordered the following labs:    CBC with Differential:   Recent Labs     01/28/21 2058 01/30/21  0651   WBC 13.4* 8.6   HGB 10.6* 9.7*   HCT 36.0* 33.2*   * 588*   LYMPHOPCT 18* 34   MONOPCT 6 9*     BMP:   Recent Labs     01/28/21 2058 01/30/21  0651    139   K 3.8 3.7    104   CO2 25 23   BUN 6 6   CREATININE 0.33* 0.42*     Hepatic Function Panel:   Recent Labs     01/29/21  1401   PROT 7.6   LABALBU 4.1   BILIDIR <0.08   IBILI CANNOT BE CALCULATED   BILITOT 0.26*   ALKPHOS 78   ALT 8   AST 12     Lab Results   Component Value Date MUCUS NOT REPORTED 01/05/2021    RBC 3.97 01/30/2021    TRICHOMONAS NOT REPORTED 01/05/2021    WBC 8.6 01/30/2021    YEAST NOT REPORTED 01/05/2021    TURBIDITY CLOUDY 01/05/2021     Lab Results   Component Value Date    CREATININE 0.42 01/30/2021    GLUCOSE 91 01/30/2021     Note:  · Labs, medications, radiologic studies were reviewed with personal review of films  · Moderate Large amounts of data were reviewed  · Discussed with nursing Staff, Discharge planner  · Infection Control and Prevention measures reviewed  · All prior entries were reviewed  · Administer medications as ordered  · Prognosis: Good  · Discharge planning reviewed  · Follow up as outpatient. Thank you for allowing us to participate in the care of this patient. Please call with questions. Morro Cardenas MD  PGY-[id number], Internal Medicine Resident  [facility]  1/30/2021 1:22 PM       I have discussed the care of the patient, including pertinent history and exam findings,  with the resident. I have seen and examined the patient and the key elements of all parts of the encounter have been performed by me. I agree with the assessment, plan and orders as documented by the resident.     Anushka Ho, Infectious Diseases

## 2021-01-30 NOTE — PROGRESS NOTES
[facility]  Internal Medicine Teaching Residency Program  Inpatient Daily Progress Note  ______________________________________________________________________________    Patient: Brian Barraza  YOB: 1965   NQF:[id number]    Acct: [de-identified]     Room: [address] [place], [place] [address]  Admit date: 1/28/2021  Today's date: 01/30/21  Number of days in the hospital: 2    SUBJECTIVE   Admitting Diagnosis: Cancer, metastatic to bone ([address]  CC: Back pain and nausea    Pt examined at bedside. Chart & results reviewed. No acute events overnight. Complains of pain currently controlled on Morphine extended release and fentanyl 25 mcg  ID on board for splenic abscess continued on Augmentin  Heme onch onboard for Metastatic ovarian cancer  Discharge planning in progress. ROS:  Constitutional:  negative for chills, fevers, sweats  Respiratory:  negative for cough, dyspnea on exertion, hemoptysis, shortness of breath, wheezing  Cardiovascular:  negative for chest pain, chest pressure/discomfort, lower extremity edema, palpitations  Gastrointestinal:  negative for abdominal pain, constipation, diarrhea, nausea, vomiting  Neurological:  negative for dizziness, headache  BRIEF HISTORY     The patient is a pleasant 60 y.o. female presents with a chief complaint of neck pain which is acute she was recently discharged from the hospital on 1/1421 after being admitted for status epilepticus with history of metastatic ovarian carcinoma. She developed pleural effusions secondary to splenic abscess for which she was treated with IV meropenem with pigtail catheter in place. She was discharged on Augmentin p.o. as she cannot afford IV meropenem.   She has been following with heme-onc for metastatic ovarian carcinoma.    Skin:  Warm and dry. Good color, turgor and pigmentation. No lesions or scars.   No cyanosis or clubbing  Neurological/Psychiatric: The patient's general behavior, level of consciousness, thought content and emotional status is normal.        Medications:  Scheduled Medications:    amoxicillin-clavulanate  1 tablet Oral 2 times per day    insulin lispro  0-18 Units Subcutaneous TID WC    insulin lispro  0-9 Units Subcutaneous Nightly    morphine  30 mg Oral BID    apixaban  10 mg Oral BID    Followed by   Dossie  ON 2/3/2021] apixaban  5 mg Oral BID    sodium chloride flush  10 mL Intravenous 2 times per day    aspirin  81 mg Oral Nightly    levETIRAcetam  1,500 mg Oral BID    lisinopril  2.5 mg Oral Daily    metoprolol succinate  25 mg Oral Daily    pantoprazole  40 mg Oral BID    sertraline  50 mg Oral Daily    sodium chloride flush  10 mL Intravenous 2 times per day     Continuous Infusions:    dextrose       PRN Medications    oxyCODONE-acetaminophen, 2 tablet, Q6H PRN      LORazepam, 1 mg, Q6H PRN      metoclopramide, 10 mg, Q6H PRN      promethazine, 25 mg, Q6H PRN      glucose, 15 g, PRN      dextrose, 12.5 g, PRN      glucagon (rDNA), 1 mg, PRN      dextrose, 100 mL/hr, PRN      fentanNYL, 25 mcg, Q2H PRN      sodium chloride flush, 10 mL, PRN      tiZANidine, 4 mg, Q6H PRN      zolpidem, 5 mg, Nightly PRN      sodium chloride flush, 10 mL, PRN      ondansetron, 4 mg, Q6H PRN      polyethylene glycol, 17 g, Daily PRN      acetaminophen, 650 mg, Q6H PRN    Or      acetaminophen, 650 mg, Q6H PRN        Diagnostic Labs:  CBC:   Recent Labs     01/28/21 2058 01/30/21  0651   WBC 13.4* 8.6   RBC 4.30 3.97   HGB 10.6* 9.7*   HCT 36.0* 33.2*   MCV 83.7 83.6   RDW 24.2* 24.2*   * 588*     BMP:   Recent Labs     01/28/21 2058 01/30/21  0651    139   K 3.8 3.7    104   CO2 25 23   BUN 6 6   CREATININE 0.33* 0.42* BNP: No results for input(s): BNP in the last 72 hours. PT/INR: No results for input(s): PROTIME, INR in the last 72 hours. APTT: No results for input(s): APTT in the last 72 hours. CARDIAC ENZYMES: No results for input(s): CKMB, CKMBINDEX, TROPONINI in the last 72 hours. Invalid input(s): CKTOTAL;3  FASTING LIPID PANEL:No results found for: CHOL, HDL, TRIG  LIVER PROFILE:   Recent Labs     01/29/21  1401   AST 12   ALT 8   BILIDIR <0.08   BILITOT 0.26*   ALKPHOS 78      MICROBIOLOGY:   Lab Results   Component Value Date/Time    CULTURE NO GROWTH 1 DAY 01/28/2021 11:55 PM       Imaging:    Ct Abdomen Pelvis W Iv Contrast Additional Contrast? None    Result Date: 1/28/2021  1. Decrease in size of the splenic fluid collection/abscess. Pigtail catheter remains in place. 2. Slight decrease in size of the left pleural effusion with persistent moderate pericardial effusion. 3. Multiple abdominal and pelvic lymph nodes are again noted, some are increased in size while others have decreased. 4. Osseous and pulmonary metastases are again noted. 5. Cholelithiasis without CT evidence of cholecystitis. Ct Lumbar Spine Trauma Reconstruction    Result Date: 1/28/2021  Sclerotic osseous metastatic disease within the lumbar spine and pelvis. No pathologic fracture identified. ASSESSMENT & PLAN     ASSESSMENT / PLAN:     Principal Problem:    Cancer, metastatic to bone [facility])  Active Problems:    Malignant neoplasm of ovary (HCC)    Seizure (HCC)    Anemia    Splenic abscess    Ovarian cancer (HCC)    Pleural effusion    Essential hypertension    Intractable low back pain  Resolved Problems:    * No resolved hospital problems. *      Metastatic ovarian carcinoma : Heme onch on board will follow up on their input.     History of blood clot likely secondary to underlying malignancy continue on Eliquis 10 mg until 2/1/20, and 5 mg twice daily soon after.     History of seizures: Continue on plan 1500 mg twice daily.    Essential hypertension: Continue on lisinopril 2.5 mg,  metoprolol 25 mg     Osteolytic lesions on lumbar spine : Symptomatic management of pain with morphine and fentanyl.     Splenic abscess: Pigtail catheter drainage in place, patient afebrile,ID on board continue on augmentin, follow-up blood cultures     Pleural effusion: Mild effusions noted on left lower lobe, monitor for now     Diabetes mellitus type II borderline: HbA1c 6 continue on Metformin 500 mg twice daily, control blood glucose. DVT ppx: On Eliquis  GI ppx: Not indicated     PT/OT/SW: Consulted  Discharge Planning: Ongoing     María Sánchez MD  Internal Medicine Resident, PGY- [address] [facility] [facility] [address];  [facility]  1/30/2021, 12:51 PM

## 2021-01-31 VITALS
HEART RATE: 69 BPM | WEIGHT: 175 LBS | DIASTOLIC BLOOD PRESSURE: 47 MMHG | SYSTOLIC BLOOD PRESSURE: 111 MMHG | OXYGEN SATURATION: 94 % | BODY MASS INDEX: 29.16 KG/M2 | TEMPERATURE: 98.7 F | HEIGHT: 65 IN | RESPIRATION RATE: 14 BRPM

## 2021-01-31 LAB
ABSOLUTE EOS #: 0.5 K/UL (ref 0–0.4)
ABSOLUTE IMMATURE GRANULOCYTE: 0 K/UL (ref 0–0.3)
ABSOLUTE LYMPH #: 2.67 K/UL (ref 1–4.8)
ABSOLUTE MONO #: 1.29 K/UL (ref 0.1–0.8)
ANION GAP SERPL CALCULATED.3IONS-SCNC: 9 MMOL/L (ref 9–17)
BASOPHILS # BLD: 0 % (ref 0–2)
BASOPHILS ABSOLUTE: 0 K/UL (ref 0–0.2)
BUN BLDV-MCNC: 12 MG/DL (ref 6–20)
BUN/CREAT BLD: NORMAL (ref 9–20)
CALCIUM SERPL-MCNC: 8.6 MG/DL (ref 8.6–10.4)
CHLORIDE BLD-SCNC: 103 MMOL/L (ref 98–107)
CO2: 27 MMOL/L (ref 20–31)
CREAT SERPL-MCNC: 0.52 MG/DL (ref 0.5–0.9)
DIFFERENTIAL TYPE: ABNORMAL
EOSINOPHILS RELATIVE PERCENT: 5 % (ref 1–4)
GFR AFRICAN AMERICAN: >60 ML/MIN
GFR NON-AFRICAN AMERICAN: >60 ML/MIN
GFR SERPL CREATININE-BSD FRML MDRD: NORMAL ML/MIN/{1.73_M2}
GFR SERPL CREATININE-BSD FRML MDRD: NORMAL ML/MIN/{1.73_M2}
GLUCOSE BLD-MCNC: 122 MG/DL (ref 65–105)
GLUCOSE BLD-MCNC: 94 MG/DL (ref 65–105)
GLUCOSE BLD-MCNC: 96 MG/DL (ref 70–99)
HCT VFR BLD CALC: 33.6 % (ref 36.3–47.1)
HEMOGLOBIN: 9.7 G/DL (ref 11.9–15.1)
IMMATURE GRANULOCYTES: 0 %
LYMPHOCYTES # BLD: 27 % (ref 24–44)
MCH RBC QN AUTO: 24.3 PG (ref 25.2–33.5)
MCHC RBC AUTO-ENTMCNC: 28.9 G/DL (ref 28.4–34.8)
MCV RBC AUTO: 84 FL (ref 82.6–102.9)
MONOCYTES # BLD: 13 % (ref 1–7)
MORPHOLOGY: ABNORMAL
NRBC AUTOMATED: 0 PER 100 WBC
PDW BLD-RTO: 23.9 % (ref 11.8–14.4)
PLATELET # BLD: 570 K/UL (ref 138–453)
PLATELET ESTIMATE: ABNORMAL
PMV BLD AUTO: 9.7 FL (ref 8.1–13.5)
POTASSIUM SERPL-SCNC: 4 MMOL/L (ref 3.7–5.3)
RBC # BLD: 4 M/UL (ref 3.95–5.11)
RBC # BLD: ABNORMAL 10*6/UL
SEG NEUTROPHILS: 55 % (ref 36–66)
SEGMENTED NEUTROPHILS ABSOLUTE COUNT: 5.44 K/UL (ref 1.8–7.7)
SODIUM BLD-SCNC: 139 MMOL/L (ref 135–144)
WBC # BLD: 9.9 K/UL (ref 3.5–11.3)
WBC # BLD: ABNORMAL 10*3/UL

## 2021-01-31 PROCEDURE — 2580000003 HC RX 258: Performed by: STUDENT IN AN ORGANIZED HEALTH CARE EDUCATION/TRAINING PROGRAM

## 2021-01-31 PROCEDURE — 80048 BASIC METABOLIC PNL TOTAL CA: CPT

## 2021-01-31 PROCEDURE — 82947 ASSAY GLUCOSE BLOOD QUANT: CPT

## 2021-01-31 PROCEDURE — 6360000002 HC RX W HCPCS: Performed by: STUDENT IN AN ORGANIZED HEALTH CARE EDUCATION/TRAINING PROGRAM

## 2021-01-31 PROCEDURE — 36415 COLL VENOUS BLD VENIPUNCTURE: CPT

## 2021-01-31 PROCEDURE — 99239 HOSP IP/OBS DSCHRG MGMT >30: CPT | Performed by: INTERNAL MEDICINE

## 2021-01-31 PROCEDURE — 6370000000 HC RX 637 (ALT 250 FOR IP): Performed by: STUDENT IN AN ORGANIZED HEALTH CARE EDUCATION/TRAINING PROGRAM

## 2021-01-31 PROCEDURE — 85025 COMPLETE CBC W/AUTO DIFF WBC: CPT

## 2021-01-31 PROCEDURE — 6370000000 HC RX 637 (ALT 250 FOR IP): Performed by: INTERNAL MEDICINE

## 2021-01-31 PROCEDURE — 6360000002 HC RX W HCPCS: Performed by: INTERNAL MEDICINE

## 2021-01-31 RX ORDER — HEPARIN SODIUM (PORCINE) LOCK FLUSH IV SOLN 100 UNIT/ML 100 UNIT/ML
500 SOLUTION INTRAVENOUS PRN
Status: DISCONTINUED | OUTPATIENT
Start: 2021-01-31 | End: 2021-01-31 | Stop reason: HOSPADM

## 2021-01-31 RX ORDER — MORPHINE SULFATE 15 MG/1
45 TABLET, FILM COATED, EXTENDED RELEASE ORAL 2 TIMES DAILY
Qty: 30 TABLET | Refills: 0 | Status: SHIPPED | OUTPATIENT
Start: 2021-01-31 | End: 2021-01-31

## 2021-01-31 RX ORDER — OXYCODONE HYDROCHLORIDE AND ACETAMINOPHEN 5; 325 MG/1; MG/1
2 TABLET ORAL EVERY 6 HOURS PRN
Qty: 20 TABLET | Refills: 0 | Status: SHIPPED | OUTPATIENT
Start: 2021-01-31 | End: 2021-02-05 | Stop reason: SDUPTHER

## 2021-01-31 RX ORDER — MORPHINE SULFATE 30 MG/1
30 TABLET, FILM COATED, EXTENDED RELEASE ORAL 2 TIMES DAILY
Qty: 10 TABLET | Refills: 0 | Status: SHIPPED | OUTPATIENT
Start: 2021-01-31 | End: 2021-02-05 | Stop reason: SDUPTHER

## 2021-01-31 RX ORDER — MORPHINE SULFATE 15 MG/1
45 TABLET, FILM COATED, EXTENDED RELEASE ORAL 2 TIMES DAILY
Qty: 180 TABLET | Refills: 0 | Status: CANCELLED | OUTPATIENT
Start: 2021-01-31 | End: 2021-03-02

## 2021-01-31 RX ORDER — MORPHINE SULFATE 15 MG/1
45 TABLET, FILM COATED, EXTENDED RELEASE ORAL 2 TIMES DAILY
Qty: 180 TABLET | Refills: 0 | Status: SHIPPED | OUTPATIENT
Start: 2021-01-31 | End: 2021-01-31

## 2021-01-31 RX ORDER — MORPHINE SULFATE 15 MG/1
30 TABLET, FILM COATED, EXTENDED RELEASE ORAL 2 TIMES DAILY
Qty: 20 TABLET | Refills: 0 | Status: SHIPPED | OUTPATIENT
Start: 2021-01-31 | End: 2021-02-05

## 2021-01-31 RX ORDER — MORPHINE SULFATE 15 MG/1
15 TABLET, FILM COATED, EXTENDED RELEASE ORAL 2 TIMES DAILY
Qty: 10 TABLET | Refills: 0 | Status: SHIPPED | OUTPATIENT
Start: 2021-01-31 | End: 2021-02-05 | Stop reason: SDUPTHER

## 2021-01-31 RX ORDER — MORPHINE SULFATE 15 MG/1
30 TABLET, FILM COATED, EXTENDED RELEASE ORAL 2 TIMES DAILY
Qty: 20 TABLET | Refills: 0 | Status: SHIPPED | OUTPATIENT
Start: 2021-01-31 | End: 2021-01-31

## 2021-01-31 RX ADMIN — METOPROLOL SUCCINATE 25 MG: 25 TABLET, FILM COATED, EXTENDED RELEASE ORAL at 09:16

## 2021-01-31 RX ADMIN — APIXABAN 10 MG: 5 TABLET, FILM COATED ORAL at 09:16

## 2021-01-31 RX ADMIN — FENTANYL CITRATE 25 MCG: 50 INJECTION, SOLUTION INTRAMUSCULAR; INTRAVENOUS at 06:03

## 2021-01-31 RX ADMIN — FENTANYL CITRATE 25 MCG: 50 INJECTION, SOLUTION INTRAMUSCULAR; INTRAVENOUS at 03:07

## 2021-01-31 RX ADMIN — PANTOPRAZOLE SODIUM 40 MG: 40 TABLET, DELAYED RELEASE ORAL at 09:16

## 2021-01-31 RX ADMIN — LORAZEPAM 1 MG: 1 TABLET ORAL at 11:43

## 2021-01-31 RX ADMIN — LISINOPRIL 2.5 MG: 2.5 TABLET ORAL at 09:16

## 2021-01-31 RX ADMIN — MORPHINE SULFATE 45 MG: 30 TABLET, EXTENDED RELEASE ORAL at 09:17

## 2021-01-31 RX ADMIN — FENTANYL CITRATE 25 MCG: 50 INJECTION, SOLUTION INTRAMUSCULAR; INTRAVENOUS at 11:32

## 2021-01-31 RX ADMIN — HEPARIN 500 UNITS: 100 SYRINGE at 13:56

## 2021-01-31 RX ADMIN — OXYCODONE HYDROCHLORIDE AND ACETAMINOPHEN 2 TABLET: 5; 325 TABLET ORAL at 13:08

## 2021-01-31 RX ADMIN — FENTANYL CITRATE 25 MCG: 50 INJECTION, SOLUTION INTRAMUSCULAR; INTRAVENOUS at 13:51

## 2021-01-31 RX ADMIN — SODIUM CHLORIDE, PRESERVATIVE FREE 10 ML: 5 INJECTION INTRAVENOUS at 09:19

## 2021-01-31 RX ADMIN — TIZANIDINE 4 MG: 4 TABLET ORAL at 11:43

## 2021-01-31 RX ADMIN — LEVETIRACETAM 1500 MG: 500 TABLET, FILM COATED ORAL at 09:16

## 2021-01-31 RX ADMIN — AMOXICILLIN AND CLAVULANATE POTASSIUM 1 TABLET: 875; 125 TABLET, FILM COATED ORAL at 09:17

## 2021-01-31 RX ADMIN — FENTANYL CITRATE 25 MCG: 50 INJECTION, SOLUTION INTRAMUSCULAR; INTRAVENOUS at 09:25

## 2021-01-31 RX ADMIN — OXYCODONE HYDROCHLORIDE AND ACETAMINOPHEN 2 TABLET: 5; 325 TABLET ORAL at 07:18

## 2021-01-31 RX ADMIN — SERTRALINE 50 MG: 50 TABLET, FILM COATED ORAL at 09:16

## 2021-01-31 ASSESSMENT — PAIN DESCRIPTION - PROGRESSION
CLINICAL_PROGRESSION: NOT CHANGED

## 2021-01-31 ASSESSMENT — PAIN SCALES - WONG BAKER
WONGBAKER_NUMERICALRESPONSE: 0

## 2021-01-31 ASSESSMENT — PAIN SCALES - GENERAL
PAINLEVEL_OUTOF10: 8
PAINLEVEL_OUTOF10: 9
PAINLEVEL_OUTOF10: 8

## 2021-01-31 NOTE — PROGRESS NOTES
[facility]  Internal Medicine Teaching Residency Program  Inpatient Daily Progress Note  ______________________________________________________________________________    Patient: Tom Miller  YOB: 1965   [id number]:[id number]    Acct: [de-identified]     Room: [address] [place], [place] [id number]  Admit date: 1/28/2021  Today's date: 01/31/21  Number of days in the hospital: 3    SUBJECTIVE   Admitting Diagnosis: Cancer, metastatic to bone ([facility] [address]  CC: Back pain and nausea    Pt examined at bedside. Chart & results reviewed. -Patient is still complaining of pain which is 9/10 in intensity  -According to the patient it was well controlled yesterday due to her current medications but she could not get her medications timely overnight  -Got 9 doses of fentanyl 25 mcg IV, morphine 90 mg orally and 3 doses of Percocet 2 tablets. ROS:  Constitutional:  negative for chills, fevers, sweats  Respiratory:  negative for cough, dyspnea on exertion, hemoptysis, shortness of breath, wheezing  Cardiovascular:  negative for chest pain, chest pressure/discomfort, lower extremity edema, palpitations  Gastrointestinal:  negative for abdominal pain, constipation, diarrhea, nausea, vomiting  Neurological:  negative for dizziness, headache  BRIEF HISTORY     The patient is a pleasant 60 y.o. female presents with a chief complaint of neck pain which is acute she was recently discharged from the hospital on 1/1421 after being admitted for status epilepticus with history of metastatic ovarian carcinoma. She developed pleural effusions secondary to splenic abscess for which she was treated with IV meropenem with pigtail catheter in place. She was discharged on Augmentin p.o. as she cannot afford IV meropenem.   She has been following with heme-onc for metastatic ovarian carcinoma.    She came into the ER today for evaluation of back pain which is radiating to her thigh. initial imaging ct lumbar and ct abdomen showed metastatic lesions in lumbar spine and pelvis, decreased in size of splenic fluid collection.     She has been admitted under internal medicine service for metastatic ovarian carcinoma with mild pleural effusion on the left lower lung. OBJECTIVE     Vital Signs:  BP (!) 104/55   Pulse 63   Temp 97.2 °F (36.2 °C) (Oral)   Resp 14   Ht 5' 5\" (1.651 m)   Wt 175 lb (79.4 kg)   SpO2 96%   BMI 29.12 kg/m²     Temp (24hrs), Av.9 °F (36.6 °C), Min:97.2 °F (36.2 °C), Max:98.6 °F (37 °C)    In: 240   Out: 10 [Drains:10]    Physical Exam:    Constitutional: This is a well developed, well nourished, 25-29.9 - Overweight 60y.o. year old female who is alert, oriented, cooperative and in no apparent distress. Head:normocephalic and atraumatic. EENT:  PERRLA. No conjunctival injections. Septum was midline, mucosa was without erythema, exudates or cobblestoning. No thrush was noted. Neck: Supple without thyromegaly. No elevated JVP. Trachea was midline. Respiratory: Chest was symmetrical without dullness to percussion. Breath sounds bilaterally were clear to auscultation. There were no wheezes, rhonchi or rales. There is no intercostal retraction or use of accessory muscles. No egophony noted. Cardiovascular: Regular without murmur, clicks, gallops or rubs. Abdomen: Slightly rounded and soft without organomegaly. No rebound, rigidity or guarding was appreciated. Lymphatic: No lymphadenopathy. Musculoskeletal: Tenderness over the lumbar spine improving with pain medication. Normal curvature of the spine. No gross muscle weakness. Extremities:  No lower extremity edema, ulcerations, tenderness, varicosities or erythema. Muscle size, tone and strength are normal.  No involuntary movements are noted. Skin:  Warm and dry. Good color, turgor and pigmentation. No lesions or scars.   No cyanosis or clubbing  Neurological/Psychiatric: The patient's general behavior, level of consciousness, thought content and emotional status is normal.        Medications:  Scheduled Medications:    morphine  45 mg Oral BID    amoxicillin-clavulanate  1 tablet Oral 2 times per day    insulin lispro  0-18 Units Subcutaneous TID WC    insulin lispro  0-9 Units Subcutaneous Nightly    apixaban  10 mg Oral BID    Followed by   Brennan Mendez ON 2/3/2021] apixaban  5 mg Oral BID    sodium chloride flush  10 mL Intravenous 2 times per day    aspirin  81 mg Oral Nightly    levETIRAcetam  1,500 mg Oral BID    lisinopril  2.5 mg Oral Daily    metoprolol succinate  25 mg Oral Daily    pantoprazole  40 mg Oral BID    sertraline  50 mg Oral Daily    sodium chloride flush  10 mL Intravenous 2 times per day     Continuous Infusions:    dextrose       PRN Medications    oxyCODONE-acetaminophen, 2 tablet, Q6H PRN      LORazepam, 1 mg, Q6H PRN      metoclopramide, 10 mg, Q6H PRN      promethazine, 25 mg, Q6H PRN      glucose, 15 g, PRN      dextrose, 12.5 g, PRN      glucagon (rDNA), 1 mg, PRN      dextrose, 100 mL/hr, PRN      fentanNYL, 25 mcg, Q2H PRN      sodium chloride flush, 10 mL, PRN      tiZANidine, 4 mg, Q6H PRN      zolpidem, 5 mg, Nightly PRN      sodium chloride flush, 10 mL, PRN      ondansetron, 4 mg, Q6H PRN      polyethylene glycol, 17 g, Daily PRN      acetaminophen, 650 mg, Q6H PRN    Or      acetaminophen, 650 mg, Q6H PRN        Diagnostic Labs:  CBC:   Recent Labs     01/28/21 2058 01/30/21  0651 01/31/21  0608   WBC 13.4* 8.6 9.9   RBC 4.30 3.97 4.00   HGB 10.6* 9.7* 9.7*   HCT 36.0* 33.2* 33.6*   MCV 83.7 83.6 84.0   RDW 24.2* 24.2* 23.9*   * 588* 570*     BMP:   Recent Labs     01/28/21 2058 01/30/21 0651 01/31/21 0608    139 139   K 3.8 3.7 4.0    104 103   CO2 25 23 27 BUN 6 6 12   CREATININE 0.33* 0.42* 0.52     BNP: No results for input(s): BNP in the last 72 hours. PT/INR: No results for input(s): PROTIME, INR in the last 72 hours. APTT: No results for input(s): APTT in the last 72 hours. CARDIAC ENZYMES: No results for input(s): CKMB, CKMBINDEX, TROPONINI in the last 72 hours. Invalid input(s): CKTOTAL;3  FASTING LIPID PANEL:No results found for: CHOL, HDL, TRIG  LIVER PROFILE:   Recent Labs     01/29/21  1401   AST 12   ALT 8   BILIDIR <0.08   BILITOT 0.26*   ALKPHOS 78      MICROBIOLOGY:   Lab Results   Component Value Date/Time    CULTURE NO GROWTH 1 DAY 01/28/2021 11:55 PM       Imaging:    Ct Abdomen Pelvis W Iv Contrast Additional Contrast? None    Result Date: 1/28/2021  1. Decrease in size of the splenic fluid collection/abscess. Pigtail catheter remains in place. 2. Slight decrease in size of the left pleural effusion with persistent moderate pericardial effusion. 3. Multiple abdominal and pelvic lymph nodes are again noted, some are increased in size while others have decreased. 4. Osseous and pulmonary metastases are again noted. 5. Cholelithiasis without CT evidence of cholecystitis. Ct Lumbar Spine Trauma Reconstruction    Result Date: 1/28/2021  Sclerotic osseous metastatic disease within the lumbar spine and pelvis. No pathologic fracture identified. ASSESSMENT & PLAN     ASSESSMENT / PLAN:     Principal Problem:    Cancer, metastatic to bone [facility])  Active Problems:    Malignant neoplasm of ovary (HCC)    Seizure (HCC)    Anemia    Splenic abscess    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)    Pleural effusion    Essential hypertension    Intractable low back pain  Resolved Problems:    * No resolved hospital problems.  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 Metastatic ovarian carcinoma :  -Heme onch on board  -They are planning for chemotherapy after the resolution of the splenic abscess     History of blood clot likely secondary to underlying malignancy:  -Continue on Eliquis 10 mg until 2/1/20, and 5 mg twice daily soon after.     History of seizures:   -Continue on Keppra 1500 mg twice daily. Essential hypertension:  -Continue on lisinopril 2.5 mg,  metoprolol 25 mg  -Latest BP is 104/55     Splenic abscess:   -Pigtail catheter drainage in place  -Patient afebrile  -ID on board   -Continue on Augmentin  -Follow-up blood cultures     Pleural effusion:   -Patient have chronic left-sided pleural effusions which has decreased in size on new CT scan  Prediabetes:   HbA1c 6 continue on Metformin 500 mg twice daily, control blood glucose. DVT ppx: On Eliquis  GI ppx: Not indicated     PT/OT/SW: Consulted  Discharge Planning: Ongoing     Laura Espinosa MD  Internal Medicine Resident, PGY- [facility];  [place], [place]  1/31/2021, 8:35 AM

## 2021-01-31 NOTE — PROGRESS NOTES
2021    TELEHEALTH EVALUATION -- Audio/Visual (During JLESG-23 public health emergency)    HPI:    Jossie Servin (:  1965) has requested an audio/video evaluation for the following concern(s):    Follow up Ovarian cancer. DIAGNOSIS:       Recurrent ovarian cancer. Original diagnosis  with multiple recurrences. Diffuse metastasis. CURRENT THERAPY:         Doxil/ Avastin started 2020. Avastin will be discontinued due to recent GI bleeding. Status post recent vascular embolization  S/p seizure disorder. BRIEF CASE HISTORY:      Ms. Patricia Macias is a very pleasant 60 y.o. female with history of multiple co morbidities as listed. The patient seen in consultation for ovarian cancer with multiple recurrences. She was originally diagnosed in  with ovarian cancer with intraperitoneal carcinomatosis. She had surgical debulking and she had systemic treatment with Taxol and carboplatin for 6 cycles. Patient was in remission for about 2 years. She had a relapse in  and treated with topotecan with good results. Patient relapsed again in  and was treated with Taxol carboplatin. After 3 cycles of systemic chemotherapy she had problems with carboplatin so she finished 3 more cycles with Taxol. She did well again for 2 to 3 years until she had a relapse in  and she had intraperitoneal chemotherapy treatment with Taxol. Patient was last seen by her oncologist in  at which time she had relatively stable disease with normal tumor marker and no significant abnormal images. Patient moved to [place] after that and she was lost for oncology follow-ups. Patient was recently evaluated again here in [facility] because of abdominal discomfort. Re imaging confirmed metastatic relapse. Biopsy confirmed ovarian cancer recurrence. Scan showed extensive intraabdominal and splenic involvement and lung mets. She has no symptoms at the present time.    Patient denies smoking or alcohol drinking.l    INTERIM HISTORY:    patient is seen for follow up recurrent ovarian cancer. Patient received 1 cycle of palliative chemotherapy using Doxil/ Avastin. Patient was hospitalized at [facility] last week because of severe GI bleeding. She had multiple blood transfusions. She had GI and vascular evaluation. She had evidence of intra-abdominal mass at the splenic area with GI infiltration. Patient had embolization by vascular. Recently readmitted because of a seizure disorder. Images showed no brain mets. Currently seizures are controlled. She is undergoing rehab. Prior to Visit Medications    Medication Sig Taking? Authorizing Provider   zolpidem (AMBIEN) 5 MG tablet Take 1 tablet by mouth nightly as needed for Sleep for up to 30 days.  Yes Joshua Burris MD   amoxicillin-clavulanate (AUGMENTIN) 875-125 MG per tablet Take 1 tablet by mouth every 12 hours pls do the blood test weekly - if the urine becomes dark or the right upper abd start being sore, pls call DR MARTÍNEZ Yes Elvia Rodriguez MD   lisinopril (PRINIVIL;ZESTRIL) 2.5 MG tablet Take 1 tablet by mouth daily Yes Arminda Rivera MD   metoprolol succinate (TOPROL XL) 25 MG extended release tablet Take 1 tablet by mouth daily Yes Arminda Rivera MD   levETIRAcetam (KEPPRA) 750 MG tablet Take 2 tablets by mouth 2 times daily Yes Albertina Bellamy MD   prochlorperazine (COMPAZINE) 10 MG tablet Take 10 mg by mouth every 6 hours as needed Yes Historical Provider, MD   pantoprazole (PROTONIX) 40 MG tablet Take 40 mg by mouth 2 times daily Yes Historical Provider, MD   aspirin 81 MG chewable tablet Take 81 mg by mouth nightly Yes Historical Provider, MD   sertraline (ZOLOFT) 100 MG tablet Take 50 mg by mouth daily  Yes Historical Provider, MD   apixaban (ELIQUIS) 5 MG TABS tablet Take 2 tablets by mouth 2 times daily for 5 doses  Arminda Rivera MD   apixaban (ELIQUIS) 5 MG TABS tablet Take 1 tablet by mouth 2 times daily  Arminda Rivera MD   oxyCODONE-acetaminophen (PERCOCET) 5-325 MG per tablet Take 2 tablets by mouth every 6 hours as needed for Pain (breakthrough pain) for up to 7 days. Yousuf Lal MD   morphine (MS CONTIN) 15 MG extended release tablet Take 2 tablets by mouth 2 times daily for 5 days. Yousuf Lal MD   morphine (MS CONTIN) 15 MG extended release tablet Take 1 tablet by mouth 2 times daily for 5 days. Yousuf Lal MD   morphine (MS CONTIN) 30 MG extended release tablet Take 1 tablet by mouth 2 times daily for 5 days. Yousuf Lal MD   metFORMIN (GLUCOPHAGE-XR) 500 MG extended release tablet Take 2 tablets by mouth twice daily  Julia Jolly MD   tiZANidine (ZANAFLEX) 4 MG tablet Take 4 mg by mouth every 6 hours as needed  Historical Provider, MD   ibuprofen (ADVIL;MOTRIN) 200 MG tablet Take 200 mg by mouth every 6 hours as needed for Pain  Historical Provider, MD       Social History     Tobacco Use    Smoking status: Former Smoker     Packs/day: 1.00    Smokeless tobacco: Current User   Substance Use Topics    Alcohol use: Not Currently    Drug use: Never            PHYSICAL EXAMINATION:  [ INSTRUCTIONS:  \"[x]\" Indicates a positive item  \"[]\" Indicates a negative item  -- DELETE ALL ITEMS NOT EXAMINED]  Vital Signs: (As obtained by patient/caregiver or practitioner observation)    Blood pressure-  Heart rate-    Respiratory rate-    Temperature-  Pulse oximetry-     Constitutional: [x] Appears well-developed and well-nourished [x] No apparent distress      [] Abnormal-   Mental status  [] Alert and awake  [x] Oriented to person/place/time []Able to follow commands      Eyes:  EOM    [x]  Normal  [] Abnormal-  Sclera  [x]  Normal  [] Abnormal -         Discharge [x]  None visible  [] Abnormal -    HENT:   [x] Normocephalic, atraumatic.   [] Abnormal   [x] Mouth/Throat: Mucous membranes are moist.     External Ears [x] Normal  [] Abnormal-     Neck: [x] No visualized mass     Pulmonary/Chest: [x] Respiratory effort normal.  [x] No visualized signs of difficulty breathing or respiratory distress        [] Abnormal-      Musculoskeletal:   [x] Normal gait with no signs of ataxia         [x] Normal range of motion of neck        [] Abnormal-       Neurological:        [x] No Facial Asymmetry (Cranial nerve 7 motor function) (limited exam to video visit)          [x] No gaze palsy        [] Abnormal-         Skin:        [x] No significant exanthematous lesions or discoloration noted on facial skin         [] Abnormal-            Psychiatric:       [x] Normal Affect [x] No Hallucinations        [] Abnormal-     Other pertinent observable physical exam findings-     ASSESSMENT/PLAN:  1. Malignant neoplasm of ovary, unspecified laterality [facility])    Discussed with patient further care. She is undergoing rehab. Will re evaluate in 2 weeks for consideration of restarting chemotherapy. Patient's questions were answered to the best of her satisfaction and she verbalized full understanding and agreement. Santana Olson is a 60 y.o. female being evaluated by a Virtual Visit (video visit) encounter to address concerns as mentioned above. A caregiver was present when appropriate. Due to this being a TeleHealth encounter (During FOF-91 public health emergency), evaluation of the following organ systems was limited: Vitals/Constitutional/EENT/Resp/CV/GI//MS/Neuro/Skin/Heme-Lymph-Imm. Pursuant to the emergency declaration under the [address] [place] [address] authority and the [employer] and [employer] Act, this Virtual Visit was conducted with patient's (and/or legal guardian's) consent, to reduce the patient's risk of exposure to COVID-19 and provide necessary medical care.   The patient (and/or legal guardian) has also been advised to contact this office for worsening conditions or problems, and seek emergency medical treatment and/or call 911 if

## 2021-01-31 NOTE — DISCHARGE SUMMARY
[address]     Department of Internal Medicine - Staff Internal Medicine Teaching Service    INPATIENT DISCHARGE SUMMARY      Patient Identification:  Papo Villalobos is a 60 y.o. female. :  1965  MRN: [id number]     Acct: [de-identified]   PCP: No primary care provider on file. Admit Date:  2021  Discharge date and time: No discharge date for patient encounter. 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 *      HOSPITAL STAY     Brief Inpatient course:   Papo Villalobos is a pleasant 49-year-old female with background history of metastatic ovarian carcinoma, history of blood clots secondary to underlying malignancy, seizures, essential hypertension, splenic abscess, pleural effusion and prediabetes who was admitted to the hospital with a chief complaint of lower back pain which was radiating to her thigh. CT lumbar spine trauma reconstruction showed sclerotic osseous metastatic disease within the lumbar spine and pelvis and no pathological fracture was identified. Patient got MS Contin 45 mg twice daily, Percocet as needed and fentanyl as needed. The patient was also seen by the hematology oncology and infectious diseases. We are discharging the patient today home on MS Contin 45 mg twice daily and Percocet for breakthrough pain. Infectious disease suggested that the patient should continue the Augmentin as prescribed by them.   Hematology oncology will arrange outpatient appointment with radiation oncology for possible radiation to the bony metastasis for pain management. I discussed everything including pain medications with the patient. Patient discharged home in stable condition. procedures/ Significant Interventions:        Consults:     Consults:     Final Specialist Recommendations/Findings:   IP CONSULT TO INTERNAL MEDICINE  IP CONSULT TO CASE MANAGEMENT  IP CONSULT TO INFECTIOUS DISEASES  IP CONSULT TO HEM/ONC      Any Hospital Acquired Infections: none    Discharge Functional Status:  stable    DISCHARGE PLAN     Disposition: home    Patient Instructions:   Current Discharge Medication List      START taking these medications    Details   morphine (MS CONTIN) 15 MG extended release tablet Take 3 tablets by mouth 2 times daily for 5 days. Qty: 30 tablet, Refills: 0    Comments: Reduce doses taken as pain becomes manageable  Associated Diagnoses: Cancer, metastatic to bone ([facility] [address]         CONTINUE these medications which have CHANGED    Details   !! apixaban (ELIQUIS) 5 MG TABS tablet Take 2 tablets by mouth 2 times daily for 5 doses  Qty: 10 tablet, Refills: 0      !! apixaban (ELIQUIS) 5 MG TABS tablet Take 1 tablet by mouth 2 times daily  Qty: 60 tablet, Refills: 3      oxyCODONE-acetaminophen (PERCOCET) 5-325 MG per tablet Take 2 tablets by mouth every 6 hours as needed for Pain (breakthrough pain) for up to 7 days. Qty: 20 tablet, Refills: 0    Comments: Reduce doses taken as pain becomes manageable  Associated Diagnoses: Intractable low back pain; Cancer, metastatic to bone [facility])       ! ! - Potential duplicate medications found. Please discuss with provider.       CONTINUE these medications which have NOT CHANGED    Details   metFORMIN (GLUCOPHAGE-XR) 500 MG extended release tablet Take 2 tablets by mouth twice daily  Qty: 30 tablet, Refills: 0    Associated Diagnoses: Malignant neoplasm of ovary, unspecified laterality (HCC)      zolpidem (AMBIEN) 5 MG tablet Take 1 tablet by mouth nightly as needed for Sleep for up to 30 discharge  8. Please call us with any concerns    Follow up labs: Follow up imaging:    Note that over 30 minutes was spent in preparing discharge papers, discussing discharge with patient, medication review, etc.      Jossy Sandoval MD, MD  Internal Medicine Resident, PGY-1  [address], [place]  1/31/2021, 11:19 AM

## 2021-01-31 NOTE — PROGRESS NOTES
PULMONARY OP  PROGRESS NOTE  Oly Diamond is a 60 y.o. female being evaluated by a Virtual Visit (video visit) encounter to address concerns as mentioned above. A caregiver was present when appropriate. Due to this being a [facility]Health encounter ([facility]), evaluation of the following organ systems was limited: Vitals/Constitutional/EENT/Resp/CV/GI//MS/Neuro/Skin/Heme-Lymph-Imm. Pursuant to the emergency declaration under the [address] [place] [address] and the [employer] and [employer] Act, this Virtual Visit was conducted with patient's (and/or legal guardian's) consent, to reduce the patient's risk of exposure to COVID-19 and provide necessary medical care. The patient (and/or legal guardian) has also been advised to contact this office for worsening conditions or problems, and seek emergency medical treatment and/or call 911 if deemed necessary. Patient identification was verified at the start of the visit: Yes    Total time spent for this encounter: Not billed by time    Services were provided through a video synchronous discussion virtually to substitute for in-person clinic visit. Patient and provider were located at their individual homes. --Robert Olmos MD on 1/30/2021 at 11:05 PM    An electronic signature was used to authenticate this note. Patient:  [facility]  YOB: 1965    MRN: [id number]     Acct:        Pt seen and Chart reviewed. Mrs. Oly Diamond is here in followup for   1. Pleural effusion, left    2. History of malignant neoplasm of ovary    3. Splenic abscess    4. Seizure ([facility] [address]          Pt has not been hospitalizedor been to er since discharged from the hospital.    Has been using meds as recommended.   Denied any fever or chills  No chest pain or pressure  No shortness of breath or wheezing      Subjective:  Review of Systems Review of Systems -   General ROS: Completed and except as mentioned above were negative   Psychological ROS:  Completed and except as mentioned above were negative  Ophthalmic ROS:  Completed and except as mentioned above were negative  ENT ROS:  Completed and except as mentioned above were negative  Allergy and Immunology ROS:  Completed and except as mentioned above werenegative  Hematological and Lymphatic ROS:  Completed and except as mentioned above were negative  Endocrine ROS: Completed and except as mentioned above were negative  Respiratory ROS:  Completed and except asmentioned above were negative  Cardiovascular ROS:  Completed and except as mentioned above were negative  Gastrointestinal ROS: Completed and except as mentioned above were negative  Genito-Urinary ROS:  Completedand except as mentioned above were negative  Musculoskeletal ROS:  Completed and except as mentioned above were negative  Neurological ROS:  Completed and except as mentioned above were negative  Dermatological ROS:Completed and except as mentioned above were negative            Allergies: Allergies   Allergen Reactions    Ceftriaxone      Had a rash on ceftriaxone December 2020, [facility]       Medications:  No current outpatient medications on file. Objective:    Physical Exam:  Vitals: There were no vitals taken for this visit. Last 3 weights: Wt Readings from Last 3 Encounters:   01/29/21 162 lb 7.7 oz (73.7 kg)   01/09/21 166 lb 7.2 oz (75.5 kg)   01/04/21 165 lb (74.8 kg)     There is no height or weight on file to calculate BMI. Physical Examination:   PHYSICAL EXAMINATION:There were no vitals filed for this visit. Physical Exam        PHYSICAL EXAMINATION:  Due to this being a TeleHealth encounter, evaluation of the following organ systems is limited: Vitals/Constitutional/EENT/Resp/CV/GI//MS/Neuro/Skin/Heme-Lymph-Imm. Constitutional: [x] Appears well-developed and well-nourished.      [] Abnormal

## 2021-01-31 NOTE — DISCHARGE INSTR - COC
Continuity of Care Form    Patient Name: Zahira Jacobsen   :  1965  MRN:  [id number]    Admit date:  2021  Discharge date:  2021    Code Status Order: Full Code   Advance Directives:      Admitting Physician:  Brendan Sotelo MD  PCP: No primary care provider on file. Discharging Nurse: [facility] Unit/Room#: 3871/6481-46  Discharging Unit Phone Number: [phone]    Emergency Contact:   Extended Emergency Contact Information  Primary Emergency Contact: Annette Beltrán hipaa  Address: n/a  Home Phone: [phone]  Work Phone: [phone]  Mobile Phone: [phone]  Relation: Child  Secondary Emergency Contact: Yovani Garcia (son hipaa)  Address: [address] [facility], [address] [place] [address]  Home Phone: [phone]  Relation: Child    Past Surgical History:  Past Surgical History:   Procedure Laterality Date    ABSCESS DRAINAGE      Franca rectal    ANUS SURGERY      ANAL FISSURECTOMY    COLECTOMY  2013    ex lap, tumor debulking, transverse colectomy w reanastamosis, subgastric omentectomy, intraperitoneal port placement    HYSTERECTOMY, TOTAL ABDOMINAL      IR EMBOLIZATION HEMORRHAGE  10/05/2020    intra-abdominal bleeding -due to splenic mass with GI infiltration. Status post embolization [employer] interlock coils x7. mri condtional 3t ok, safe immediately post implant.     IR PORT PLACEMENT EQUAL OR GREATER THAN 5 YEARS  2020    IR PORT PLACEMENT EQUAL OR GREATER THAN 5 YEARS 2020 Lóen Emmanuel MD [facility] SPECIAL PROCEDURES    PORT SURGERY      IP Port    TONSILLECTOMY         Immunization History:   Immunization History   Administered Date(s) Administered    Influenza, Quadv, IM, PF (6 mo and older Fluzone, Flulaval, Fluarix, and 3 yrs and older Afluria) 10/21/2020    Pneumococcal Conjugate 13-valent (Calleen Fossa) 10/21/2020    Pneumococcal Polysaccharide ([id number]) 2010, 2020       Active Problems:  Patient Active Problem List Diagnosis Code    Malignant neoplasm of ovary (HCC) C56.9    Seizure ([facility] [address] R56.9    Type 2 diabetes mellitus without complication, without long-term current use of insulin (HCC) E11.9    Anemia D64.9    Splenic abscess D73.3    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) C79.60    Acute encephalopathy G93.40    PRES (posterior reversible encephalopathy syndrome) I67.83    Hemianopia, bitemporal H53.47    Encephalopathy G93.40    Status epilepticus ([facility] [address] G40.901    History of malignant neoplasm of ovary Z85.43    Pleural effusion J90    Bandemia D72.825    Cancer, metastatic to bone (HCC) C79.51    Essential hypertension I10    Intractable low back pain M54.5       Isolation/Infection:   Isolation            No Isolation          Patient Infection Status       Infection Onset Added Last Indicated Last Indicated By Review Planned Expiration Resolved Resolved By    None active    Resolved    COVID-19 Rule Out 08/20/20 08/20/20 08/21/20 Covid-19 Ambulatory (Ordered)   08/22/20 Rule-Out Test Resulted            Nurse Assessment:  Last Vital Signs: BP (!) 111/47   Pulse 69   Temp 98.7 °F (37.1 °C) (Oral)   Resp 14   Ht 5' 5\" (1.651 m)   Wt 175 lb (79.4 kg)   SpO2 94%   BMI 29.12 kg/m²     Last documented pain score (0-10 scale): Pain Level: 5  Last Weight:   Wt Readings from Last 1 Encounters:   01/31/21 175 lb (79.4 kg)     Mental Status:  oriented, alert, coherent, logical and thought processes intact    IV Access:  - None    Nursing Mobility/ADLs:  Walking   Independent  Transfer  Independent  Bathing  Independent  Dressing  Independent  Toileting  Independent  Feeding  Independent  Med Admin  Independent  Med Delivery   whole    Wound Care Documentation and Therapy:  Puncture 01/12/21 Wrist Anterior;Right (Active)   Wound Assessment Clean;Dry; Intact 01/14/21 0800   Franca-wound Assessment Clean;Dry; Intact 01/14/21 0800   Closure Other (Comment) 01/13/21 1600

## 2021-02-04 LAB
CULTURE: NORMAL
CULTURE: NORMAL
Lab: NORMAL
Lab: NORMAL
SPECIMEN DESCRIPTION: NORMAL
SPECIMEN DESCRIPTION: NORMAL

## 2021-02-05 ENCOUNTER — VIRTUAL VISIT (OUTPATIENT)
Dept: ONCOLOGY | Age: 56
End: 2021-02-05
Payer: COMMERCIAL

## 2021-02-05 ENCOUNTER — TELEPHONE (OUTPATIENT)
Dept: ONCOLOGY | Age: 56
End: 2021-02-05

## 2021-02-05 DIAGNOSIS — C79.60 MALIGNANT NEOPLASM METASTATIC TO OVARY, UNSPECIFIED LATERALITY (HCC): Primary | ICD-10-CM

## 2021-02-05 DIAGNOSIS — M54.59 INTRACTABLE LOW BACK PAIN: ICD-10-CM

## 2021-02-05 DIAGNOSIS — C79.51 CANCER, METASTATIC TO BONE (HCC): ICD-10-CM

## 2021-02-05 DIAGNOSIS — C56.9 MALIGNANT NEOPLASM OF OVARY, UNSPECIFIED LATERALITY (HCC): Primary | ICD-10-CM

## 2021-02-05 PROCEDURE — 99214 OFFICE O/P EST MOD 30 MIN: CPT | Performed by: INTERNAL MEDICINE

## 2021-02-05 RX ORDER — SODIUM CHLORIDE 9 MG/ML
INJECTION, SOLUTION INTRAVENOUS CONTINUOUS
Status: CANCELLED | OUTPATIENT
Start: 2021-02-26

## 2021-02-05 RX ORDER — DIPHENHYDRAMINE HYDROCHLORIDE 50 MG/ML
50 INJECTION INTRAMUSCULAR; INTRAVENOUS ONCE
Status: CANCELLED | OUTPATIENT
Start: 2021-02-26 | End: 2021-02-12

## 2021-02-05 RX ORDER — SODIUM CHLORIDE 0.9 % (FLUSH) 0.9 %
5 SYRINGE (ML) INJECTION PRN
Status: CANCELLED | OUTPATIENT
Start: 2021-02-26

## 2021-02-05 RX ORDER — SERTRALINE HYDROCHLORIDE 100 MG/1
100 TABLET, FILM COATED ORAL DAILY
Qty: 30 TABLET | Refills: 3 | Status: SHIPPED | OUTPATIENT
Start: 2021-02-05 | End: 2021-06-07 | Stop reason: SDUPTHER

## 2021-02-05 RX ORDER — METHYLPREDNISOLONE SODIUM SUCCINATE 125 MG/2ML
125 INJECTION, POWDER, LYOPHILIZED, FOR SOLUTION INTRAMUSCULAR; INTRAVENOUS ONCE
Status: CANCELLED | OUTPATIENT
Start: 2021-02-19 | End: 2021-02-05

## 2021-02-05 RX ORDER — SODIUM CHLORIDE 0.9 % (FLUSH) 0.9 %
10 SYRINGE (ML) INJECTION PRN
Status: CANCELLED | OUTPATIENT
Start: 2021-02-19

## 2021-02-05 RX ORDER — EPINEPHRINE 1 MG/ML
0.3 INJECTION, SOLUTION, CONCENTRATE INTRAVENOUS PRN
Status: CANCELLED | OUTPATIENT
Start: 2021-02-19

## 2021-02-05 RX ORDER — SODIUM CHLORIDE 0.9 % (FLUSH) 0.9 %
5 SYRINGE (ML) INJECTION PRN
Status: CANCELLED | OUTPATIENT
Start: 2021-02-19

## 2021-02-05 RX ORDER — LISINOPRIL 2.5 MG/1
2.5 TABLET ORAL DAILY
Qty: 30 TABLET | Refills: 3 | Status: ON HOLD
Start: 2021-02-05 | End: 2021-03-12 | Stop reason: HOSPADM

## 2021-02-05 RX ORDER — OXYCODONE HYDROCHLORIDE AND ACETAMINOPHEN 5; 325 MG/1; MG/1
1 TABLET ORAL EVERY 4 HOURS PRN
Qty: 180 TABLET | Refills: 0 | Status: SHIPPED | OUTPATIENT
Start: 2021-02-05 | End: 2021-03-08

## 2021-02-05 RX ORDER — METOPROLOL SUCCINATE 25 MG/1
25 TABLET, EXTENDED RELEASE ORAL DAILY
Qty: 30 TABLET | Refills: 3 | Status: SHIPPED | OUTPATIENT
Start: 2021-02-05 | End: 2021-06-07 | Stop reason: SDUPTHER

## 2021-02-05 RX ORDER — HEPARIN SODIUM (PORCINE) LOCK FLUSH IV SOLN 100 UNIT/ML 100 UNIT/ML
500 SOLUTION INTRAVENOUS PRN
Status: CANCELLED | OUTPATIENT
Start: 2021-02-26

## 2021-02-05 RX ORDER — SODIUM CHLORIDE 9 MG/ML
20 INJECTION, SOLUTION INTRAVENOUS ONCE
Status: CANCELLED | OUTPATIENT
Start: 2021-02-19 | End: 2021-02-05

## 2021-02-05 RX ORDER — SODIUM CHLORIDE 9 MG/ML
20 INJECTION, SOLUTION INTRAVENOUS ONCE
Status: CANCELLED | OUTPATIENT
Start: 2021-02-26 | End: 2021-02-12

## 2021-02-05 RX ORDER — SODIUM CHLORIDE 0.9 % (FLUSH) 0.9 %
10 SYRINGE (ML) INJECTION PRN
Status: CANCELLED | OUTPATIENT
Start: 2021-02-26

## 2021-02-05 RX ORDER — HEPARIN SODIUM (PORCINE) LOCK FLUSH IV SOLN 100 UNIT/ML 100 UNIT/ML
500 SOLUTION INTRAVENOUS PRN
Status: CANCELLED | OUTPATIENT
Start: 2021-02-19

## 2021-02-05 RX ORDER — EPINEPHRINE 1 MG/ML
0.3 INJECTION, SOLUTION, CONCENTRATE INTRAVENOUS PRN
Status: CANCELLED | OUTPATIENT
Start: 2021-02-26

## 2021-02-05 RX ORDER — MORPHINE SULFATE 15 MG/1
15 TABLET, FILM COATED, EXTENDED RELEASE ORAL 2 TIMES DAILY
Qty: 10 TABLET | Refills: 0 | Status: SHIPPED | OUTPATIENT
Start: 2021-02-05 | End: 2021-02-10 | Stop reason: SDUPTHER

## 2021-02-05 RX ORDER — SODIUM CHLORIDE 9 MG/ML
INJECTION, SOLUTION INTRAVENOUS CONTINUOUS
Status: CANCELLED | OUTPATIENT
Start: 2021-02-19

## 2021-02-05 RX ORDER — DIPHENHYDRAMINE HYDROCHLORIDE 50 MG/ML
50 INJECTION INTRAMUSCULAR; INTRAVENOUS ONCE
Status: CANCELLED | OUTPATIENT
Start: 2021-02-19 | End: 2021-02-05

## 2021-02-05 RX ORDER — METHYLPREDNISOLONE SODIUM SUCCINATE 125 MG/2ML
125 INJECTION, POWDER, LYOPHILIZED, FOR SOLUTION INTRAMUSCULAR; INTRAVENOUS ONCE
Status: CANCELLED | OUTPATIENT
Start: 2021-02-26 | End: 2021-02-12

## 2021-02-05 RX ORDER — MORPHINE SULFATE 30 MG/1
30 TABLET, FILM COATED, EXTENDED RELEASE ORAL 2 TIMES DAILY
Qty: 10 TABLET | Refills: 0 | Status: SHIPPED | OUTPATIENT
Start: 2021-02-05 | End: 2021-02-10 | Stop reason: SDUPTHER

## 2021-02-05 NOTE — PROGRESS NOTES
2021    TELEHEALTH EVALUATION -- Audio/Visual (During SELVK- public health emergency)    HPI:    Marianna Montes De Oca (:  1965) has requested an audio/video evaluation for the following concern(s):    Follow up Ovarian cancer. DIAGNOSIS:       Recurrent ovarian cancer. Original diagnosis  with multiple recurrences. Diffuse metastasis. CURRENT THERAPY:         Doxil/ Avastin started 2020. Avastin will be discontinued due to recent GI bleeding. Status post recent vascular embolization  S/p seizure disorder. BRIEF CASE HISTORY:      Ms. Andrei Lucio is a very pleasant 60 y.o. female with history of multiple co morbidities as listed. The patient seen in consultation for ovarian cancer with multiple recurrences. She was originally diagnosed in  with ovarian cancer with intraperitoneal carcinomatosis. She had surgical debulking and she had systemic treatment with Taxol and carboplatin for 6 cycles. Patient was in remission for about 2 years. She had a relapse in  and treated with topotecan with good results. Patient relapsed again in  and was treated with Taxol carboplatin. After 3 cycles of systemic chemotherapy she had problems with carboplatin so she finished 3 more cycles with Taxol. She did well again for 2 to 3 years until she had a relapse in  and she had intraperitoneal chemotherapy treatment with Taxol. Patient was last seen by her oncologist in  at which time she had relatively stable disease with normal tumor marker and no significant abnormal images. Patient moved to [place] [address] after that and she was lost for oncology follow-ups. Patient was recently evaluated again here in [facility] because of abdominal discomfort. Re imaging confirmed metastatic relapse. Biopsy confirmed ovarian cancer recurrence. Scan showed extensive intraabdominal and splenic involvement and lung mets. She has no symptoms at the present time.    Patient denies smoking or alcohol drinking.l    INTERIM HISTORY:    patient is seen for follow up recurrent ovarian cancer. Patient received 1 cycle of palliative chemotherapy using Doxil/ Avastin. Patient was hospitalized at [facility] last week because of severe GI bleeding. She had multiple blood transfusions. She had GI and vascular evaluation. She had evidence of intra-abdominal mass at the splenic area with GI infiltration. Patient had embolization by vascular. Patient had multiple hospitalizations for different problems. She was recently found to have leg DVT. Started on Eliquis. Swelling and pain subsided. Images also showed bone metastasis. Pain is controlled on current medications. Prior to Visit Medications    Medication Sig Taking? Authorizing Provider   sertraline (ZOLOFT) 100 MG tablet Take 1 tablet by mouth daily Yes Justus Bhatia MD   morphine (MS CONTIN) 15 MG extended release tablet Take 1 tablet by mouth 2 times daily for 5 days. Yes Luis Mclaughlin MD   morphine (MS CONTIN) 30 MG extended release tablet Take 1 tablet by mouth 2 times daily for 5 days. Yes Luis Mclaughlin MD   oxyCODONE-acetaminophen (PERCOCET) 5-325 MG per tablet Take 1 tablet by mouth every 4 hours as needed for Pain (breakthrough pain) for up to 30 days. Yes Luis Mclaughlin MD   lisinopril (PRINIVIL;ZESTRIL) 2.5 MG tablet Take 1 tablet by mouth daily Yes Luis Mclaughlin MD   metoprolol succinate (TOPROL XL) 25 MG extended release tablet Take 1 tablet by mouth daily Yes Luis Mclaughlin MD   apixaban (ELIQUIS) 5 MG TABS tablet Take 1 tablet by mouth 2 times daily Yes Keli Harris MD   morphine (MS CONTIN) 15 MG extended release tablet Take 2 tablets by mouth 2 times daily for 5 days.  Yes Keli Harris MD   metFORMIN (GLUCOPHAGE-XR) 500 MG extended release tablet Take 2 tablets by mouth twice daily Yes Luis Mclaughlin MD   zolpidem (AMBIEN) 5 MG tablet Take 1 tablet by mouth nightly as needed for Sleep for up to 30 days. Yes Nelli To MD   amoxicillin-clavulanate (AUGMENTIN) 875-125 MG per tablet Take 1 tablet by mouth every 12 hours pls do the blood test weekly - if the urine becomes dark or the right upper abd start being sore, pls call DR MARTÍNEZ Yes Angie Suárez, MD   levETIRAcetam (KEPPRA) 750 MG tablet Take 2 tablets by mouth 2 times daily Yes Myla Monsivais MD   prochlorperazine (COMPAZINE) 10 MG tablet Take 10 mg by mouth every 6 hours as needed Yes Historical Provider, MD   tiZANidine (ZANAFLEX) 4 MG tablet Take 4 mg by mouth every 6 hours as needed Yes Historical Provider, MD   pantoprazole (PROTONIX) 40 MG tablet Take 40 mg by mouth 2 times daily Yes Historical Provider, MD   aspirin 81 MG chewable tablet Take 81 mg by mouth nightly  Historical Provider, MD       Social History     Tobacco Use    Smoking status: Former Smoker     Packs/day: 1.00    Smokeless tobacco: Current User   Substance Use Topics    Alcohol use: Not Currently    Drug use: Never            PHYSICAL EXAMINATION:  [ INSTRUCTIONS:  \"[x]\" Indicates a positive item  \"[]\" Indicates a negative item  -- DELETE ALL ITEMS NOT EXAMINED]  Vital Signs: (As obtained by patient/caregiver or practitioner observation)    Blood pressure-  Heart rate-    Respiratory rate-    Temperature-  Pulse oximetry-     Constitutional: [x] Appears well-developed and well-nourished [x] No apparent distress      [] Abnormal-   Mental status  [] Alert and awake  [x] Oriented to person/place/time []Able to follow commands      Eyes:  EOM    [x]  Normal  [] Abnormal-  Sclera  [x]  Normal  [] Abnormal -         Discharge [x]  None visible  [] Abnormal -    HENT:   [x] Normocephalic, atraumatic.   [] Abnormal   [x] Mouth/Throat: Mucous membranes are moist.     External Ears [x] Normal  [] Abnormal-     Neck: [x] No visualized mass     Pulmonary/Chest: [x] Respiratory effort normal.  [x] No visualized signs of difficulty breathing or respiratory distress        [] Abnormal-      Musculoskeletal:   [x] Normal gait with no signs of ataxia         [x] Normal range of motion of neck        [] Abnormal-       Neurological:        [x] No Facial Asymmetry (Cranial nerve 7 motor function) (limited exam to video visit)          [x] No gaze palsy        [] Abnormal-         Skin:        [x] No significant exanthematous lesions or discoloration noted on facial skin         [] Abnormal-            Psychiatric:       [x] Normal Affect [x] No Hallucinations        [] Abnormal-     Other pertinent observable physical exam findings-     ASSESSMENT/PLAN:  1. Malignant neoplasm of ovary, unspecified laterality [facility])    Discussed with patient further care. She is improving clinically. She had multiple problems including progression of her cancer. She had 1 cycle of Doxil but also she had multiple cardiac problems during her recent hospitalizations. Discussed options of treatment. We will give gemcitabine. Explained benefits and side effects. She agreed. Pain is under control. We discussed possible need for palliative radiation therapy if she starts having significant uncontrolled pain. Patient's questions were answered to the best of her satisfaction and she verbalized full understanding and agreement. Jossie Servin is a 60 y.o. female being evaluated by a Virtual Visit (video visit) encounter to address concerns as mentioned above. A caregiver was present when appropriate. Due to this being a TeleHealth encounter (During ONO-71 public health emergency), evaluation of the following organ systems was limited: Vitals/Constitutional/EENT/Resp/CV/GI//MS/Neuro/Skin/Heme-Lymph-Imm.   Pursuant to the emergency declaration under the [address] [place] [address], [address] [place] [address] authority and the [employer] and [employer] Act, this Virtual Visit was conducted with patient's (and/or legal guardian's) consent, to reduce the patient's risk of exposure to COVID-19 and provide necessary medical care. The patient (and/or legal guardian) has also been advised to contact this office for worsening conditions or problems, and seek emergency medical treatment and/or call 911 if deemed necessary. Patient identification was verified at the start of the visit: Yes    Total time spent on this encounter: Not billed by time    Services were provided through a video synchronous discussion virtually to substitute for in-person clinic visit. Patient and provider were located at their individual homes. --Shelton Hollis MD on 2/5/2021 at 4:49 PM    An electronic signature was used to authenticate this note.

## 2021-02-05 NOTE — TELEPHONE ENCOUNTER
AVS from 2/5/21     Start Gemzar next Friday after insurance authorization  RV on Day 8 of chemo    AVS given to  to follow when precert comes back    Electronically signed by Davidson Priest on 2/5/2021 at 3:12 PM

## 2021-02-11 ENCOUNTER — HOSPITAL ENCOUNTER (OUTPATIENT)
Age: 56
Discharge: HOME | End: 2021-02-11
Payer: COMMERCIAL

## 2021-02-11 LAB
ABSOLUTE EOS #: 0.34 K/UL (ref 0–0.4)
ABSOLUTE IMMATURE GRANULOCYTE: 0 K/UL (ref 0–0.3)
ABSOLUTE LYMPH #: 2.49 K/UL (ref 1–4.8)
ABSOLUTE MONO #: 0.68 K/UL (ref 0.1–0.8)
ALBUMIN SERPL-MCNC: 4.1 G/DL (ref 3.5–5.2)
ALBUMIN/GLOBULIN RATIO: 1.4 (ref 1–2.5)
ALP BLD-CCNC: 68 U/L (ref 35–104)
ALT SERPL-CCNC: <5 U/L (ref 5–33)
AST SERPL-CCNC: 10 U/L
BASOPHILS # BLD: 0 % (ref 0–2)
BASOPHILS ABSOLUTE: 0 K/UL (ref 0–0.2)
BILIRUB SERPL-MCNC: <0.1 MG/DL (ref 0.3–1.2)
BILIRUBIN DIRECT: <0.08 MG/DL
BILIRUBIN, INDIRECT: ABNORMAL MG/DL (ref 0–1)
CREAT SERPL-MCNC: 0.44 MG/DL (ref 0.5–0.9)
DIFFERENTIAL TYPE: ABNORMAL
EOSINOPHILS RELATIVE PERCENT: 3 % (ref 1–4)
GFR AFRICAN AMERICAN: >60 ML/MIN
GFR NON-AFRICAN AMERICAN: >60 ML/MIN
GFR SERPL CREATININE-BSD FRML MDRD: ABNORMAL ML/MIN/{1.73_M2}
GFR SERPL CREATININE-BSD FRML MDRD: ABNORMAL ML/MIN/{1.73_M2}
GLOBULIN: ABNORMAL G/DL (ref 1.5–3.8)
HCT VFR BLD CALC: 30.1 % (ref 36.3–47.1)
HEMOGLOBIN: 8.8 G/DL (ref 11.9–15.1)
IMMATURE GRANULOCYTES: 0 %
LYMPHOCYTES # BLD: 22 % (ref 24–44)
MCH RBC QN AUTO: 24.8 PG (ref 25.2–33.5)
MCHC RBC AUTO-ENTMCNC: 29.2 G/DL (ref 28.4–34.8)
MCV RBC AUTO: 84.8 FL (ref 82.6–102.9)
MONOCYTES # BLD: 6 % (ref 1–7)
MORPHOLOGY: ABNORMAL
NRBC AUTOMATED: 0 PER 100 WBC
PDW BLD-RTO: 21.1 % (ref 11.8–14.4)
PLATELET # BLD: 703 K/UL (ref 138–453)
PLATELET ESTIMATE: ABNORMAL
PMV BLD AUTO: 8.7 FL (ref 8.1–13.5)
RBC # BLD: 3.55 M/UL (ref 3.95–5.11)
RBC # BLD: ABNORMAL 10*6/UL
SEG NEUTROPHILS: 69 % (ref 36–66)
SEGMENTED NEUTROPHILS ABSOLUTE COUNT: 7.79 K/UL (ref 1.8–7.7)
TOTAL PROTEIN: 7.1 G/DL (ref 6.4–8.3)
WBC # BLD: 11.3 K/UL (ref 3.5–11.3)
WBC # BLD: ABNORMAL 10*3/UL

## 2021-02-11 PROCEDURE — 85025 COMPLETE CBC W/AUTO DIFF WBC: CPT

## 2021-02-11 PROCEDURE — 36415 COLL VENOUS BLD VENIPUNCTURE: CPT

## 2021-02-11 PROCEDURE — 80076 HEPATIC FUNCTION PANEL: CPT

## 2021-02-11 PROCEDURE — 82565 ASSAY OF CREATININE: CPT

## 2021-02-15 ENCOUNTER — TELEPHONE (OUTPATIENT)
Dept: INFUSION THERAPY | Age: 56
End: 2021-02-15

## 2021-02-15 LAB
CULTURE: NORMAL
Lab: NORMAL
SPECIMEN DESCRIPTION: NORMAL

## 2021-02-15 NOTE — TELEPHONE ENCOUNTER
Chemotherapy orders received:    Ht=65 inches  Ug=448 lbs  BSA=1.91  Chemotherapy doses verified:  Gemzar 1000mg/m2 = 1906 mg   Hedy Encinas RN

## 2021-02-17 ENCOUNTER — OFFICE VISIT (OUTPATIENT)
Dept: INFECTIOUS DISEASES | Age: 56
End: 2021-02-17
Payer: COMMERCIAL

## 2021-02-17 VITALS
HEIGHT: 65 IN | DIASTOLIC BLOOD PRESSURE: 78 MMHG | BODY MASS INDEX: 26.66 KG/M2 | WEIGHT: 160 LBS | SYSTOLIC BLOOD PRESSURE: 122 MMHG | TEMPERATURE: 97.3 F

## 2021-02-17 DIAGNOSIS — K52.1 DIARRHEA DUE TO DRUG: ICD-10-CM

## 2021-02-17 DIAGNOSIS — D84.9 IMMUNOSUPPRESSED STATUS (HCC): ICD-10-CM

## 2021-02-17 DIAGNOSIS — D73.3 SPLEEN, ABSCESS: Primary | ICD-10-CM

## 2021-02-17 PROCEDURE — 99214 OFFICE O/P EST MOD 30 MIN: CPT | Performed by: INTERNAL MEDICINE

## 2021-02-17 RX ORDER — LACTOBACILLUS RHAMNOSUS GG 10B CELL
1 CAPSULE ORAL DAILY
Qty: 30 CAPSULE | Refills: 1 | Status: SHIPPED | OUTPATIENT
Start: 2021-02-17 | End: 2021-03-19

## 2021-02-17 ASSESSMENT — ENCOUNTER SYMPTOMS
ABDOMINAL DISTENTION: 0
APNEA: 0
EYE ITCHING: 0
DIARRHEA: 1
COLOR CHANGE: 0

## 2021-02-17 NOTE — PROGRESS NOTES
[facility] - Initial Consult Note  Today's Date: 2/17/2021    Impression :   STV 1/30/21  · Metastatic ovarian cancer - chemo to resume 2/20/21  · post spleen embolization for mets 9/2020  · 11/2020 TH -Splenic abscess with pig tail catheter drainage  - multi GI org and anaerobes    Recommendations   will stop Ab and watch drainage off the AB  And under the chemo and GCSF  If diarrhea worse, get a c diff/ call me  Start culturelle daily  If drainage worse or pain, call to resume amoxicillin  See in 4 weeks   Diagnosis Orders   1. Spleen, abscess  lactobacillus (CULTURELLE) capsule   2. Immunosuppressed status (HCC)  lactobacillus (CULTURELLE) capsule   3. Diarrhea due to drug  lactobacillus (CULTURELLE) capsule       Return in about 4 weeks (around 3/17/2021). History of Present Illness:   Aron Vazquez is a 60y.o.-year-old  female who presents with   Chief Complaint   Patient presents with    Follow-Up from [facility]. Vs      STV 1/30/21  · Splenic abscess smaller- pig tail drainage - augmentin  · Pleural effusion  · Metastatic ovarian carcinoma   ? Large retroperiton LN  · Osteolytic lumbar spine lesions  · Status Epilepticus  · Blood clot   · Type 2 Diabetes Mellitus    Plan:  · Continue Augmentin for splenic abscess - might take a long time to dry due to underlying necrotic spleen  · Monitor LFTs - AST/ALT WNL   · Oncology on board - pain control and outpatient follow up   · Outpatient ID Follow up       Visit 2/17/21   feels ok - tired- walking well  Just done w augmentin today  Diarrhea bad many x per day likely from augmentin,  Not C diff smelling, no cramps. Left splenic drain very small cloudy fluid - not flushing it.  Fluid is very thin  Will start chemo Friday w GCSF,     will stop Ab and watch drainage off the AB  And under the chemo and GCSF  If diarrhea worse, get a c diff/ call me  Start culturelle daily  If drainage worse or pain, call to resume CONTIN) 30 MG extended release tablet, Take 1 tablet by mouth 2 times daily for 30 days. , Disp: 60 tablet, Rfl: 0    sertraline (ZOLOFT) 100 MG tablet, Take 1 tablet by mouth daily, Disp: 30 tablet, Rfl: 3    oxyCODONE-acetaminophen (PERCOCET) 5-325 MG per tablet, Take 1 tablet by mouth every 4 hours as needed for Pain (breakthrough pain) for up to 30 days. , Disp: 180 tablet, Rfl: 0    lisinopril (PRINIVIL;ZESTRIL) 2.5 MG tablet, Take 1 tablet by mouth daily, Disp: 30 tablet, Rfl: 3    metoprolol succinate (TOPROL XL) 25 MG extended release tablet, Take 1 tablet by mouth daily, Disp: 30 tablet, Rfl: 3    apixaban (ELIQUIS) 5 MG TABS tablet, Take 1 tablet by mouth 2 times daily, Disp: 60 tablet, Rfl: 3    metFORMIN (GLUCOPHAGE-XR) 500 MG extended release tablet, Take 2 tablets by mouth twice daily, Disp: 30 tablet, Rfl: 0    levETIRAcetam (KEPPRA) 750 MG tablet, Take 2 tablets by mouth 2 times daily, Disp: 120 tablet, Rfl: 2    pantoprazole (PROTONIX) 40 MG tablet, Take 40 mg by mouth 2 times daily, Disp: , Rfl:     aspirin 81 MG chewable tablet, Take 81 mg by mouth nightly, Disp: , Rfl:       Social History:     Social History     Socioeconomic History    Marital status: Single     Spouse name: Not on file    Number of children: Not on file    Years of education: Not on file    Highest education level: Not on file   Occupational History    Not on file   Social Needs    Financial resource strain: Not on file    Food insecurity     Worry: Not on file     Inability: Not on file    Transportation needs     Medical: Not on file     Non-medical: Not on file   Tobacco Use    Smoking status: Current Every Day Smoker     Packs/day: 1.00     Types: Cigarettes    Smokeless tobacco: Current User   Substance and Sexual Activity    Alcohol use: Not Currently    Drug use: Never    Sexual activity: Not on file   Lifestyle    Physical activity     Days per week: Not on file     Minutes per session: Not on file  Stress: Not on file   Relationships    Social connections     Talks on phone: Not on file     Gets together: Not on file     Attends Jain service: Not on file     Active member of club or organization: Not on file     Attends meetings of clubs or organizations: Not on file     Relationship status: Not on file    Intimate partner violence     Fear of current or ex partner: Not on file     Emotionally abused: Not on file     Physically abused: Not on file     Forced sexual activity: Not on file   Other Topics Concern    Not on file   Social History Narrative    Not on file       Family History:     Family History   Problem Relation Age of Onset    Alcohol Abuse Mother     Cirrhosis Mother         Allergies:   Ceftriaxone     Review of Systems:   Review of Systems   Constitutional: Negative for activity change. HENT: Negative for congestion. Eyes: Negative for itching. Respiratory: Negative for apnea. Cardiovascular: Negative for chest pain. Gastrointestinal: Positive for diarrhea. Negative for abdominal distention. Endocrine: Negative for heat intolerance. Genitourinary: Negative for dysuria. Skin: Negative for color change. Allergic/Immunologic: Negative for immunocompromised state. Neurological: Negative for dizziness. Hematological: Negative for adenopathy. Psychiatric/Behavioral: Negative for agitation. Physical Examination :   Blood pressure 122/78, temperature 97.3 °F (36.3 °C), temperature source Temporal, height 5' 5\" (1.651 m), weight 160 lb (72.6 kg). Physical Exam  Constitutional:       General: She is not in acute distress. Appearance: Normal appearance. She is not ill-appearing. HENT:      Head: Normocephalic and atraumatic. Nose: Nose normal.      Mouth/Throat:      Mouth: Mucous membranes are moist.   Eyes:      Conjunctiva/sclera: Conjunctivae normal.   Neck:      Musculoskeletal: Neck supple. No neck rigidity.    Cardiovascular:      Rate and Rhythm: Normal rate and regular rhythm. Heart sounds: Normal heart sounds. No murmur. Pulmonary:      Effort: No respiratory distress. Breath sounds: Normal breath sounds. Abdominal:      General: There is no distension. Palpations: Abdomen is soft. There is no mass. Genitourinary:     Comments: No wright  Musculoskeletal:         General: No swelling or deformity. Skin:     General: Skin is dry. Coloration: Skin is not jaundiced. Neurological:      General: No focal deficit present. Mental Status: She is alert and oriented to person, place, and time. Cranial Nerves: No cranial nerve deficit. Psychiatric:         Mood and Affect: Mood normal.         Thought Content:  Thought content normal.           Medical Decision Making:   I have independently reviewed/ordered the following labs:    CBCwith Differential:   Lab Results   Component Value Date    WBC 11.3 02/11/2021    WBC 9.9 01/31/2021    HGB 8.8 02/11/2021    HGB 9.7 01/31/2021    HCT 30.1 02/11/2021    HCT 33.6 01/31/2021     02/11/2021     01/31/2021    LYMPHOPCT 22 02/11/2021    LYMPHOPCT 27 01/31/2021    MONOPCT 6 02/11/2021    MONOPCT 13 01/31/2021     BMP:  Lab Results   Component Value Date     01/31/2021     01/30/2021    K 4.0 01/31/2021    K 3.7 01/30/2021     01/31/2021     01/30/2021    CO2 27 01/31/2021    CO2 23 01/30/2021    BUN 12 01/31/2021    BUN 6 01/30/2021    CREATININE 0.44 02/11/2021    CREATININE 0.52 01/31/2021    MG 1.8 01/14/2021    MG 1.9 01/13/2021     Hepatic Function Panel:   Lab Results   Component Value Date    PROT 7.1 02/11/2021    PROT 7.6 01/29/2021    LABALBU 4.1 02/11/2021    LABALBU 4.1 01/29/2021    BILIDIR <0.08 02/11/2021    BILIDIR <0.08 01/29/2021    IBILI CANNOT BE CALCULATED 02/11/2021    IBILI CANNOT BE CALCULATED 01/29/2021    BILITOT <0.10 02/11/2021    BILITOT 0.26 01/29/2021    ALKPHOS 68 02/11/2021    ALKPHOS 78 01/29/2021    ALT <5 02/11/2021    ALT 8 01/29/2021    AST 10 02/11/2021    AST 12 01/29/2021     No results found for: RPR  No results found for: HIV  No results found for: [facility]  Lab Results   Component Value Date    MUCUS NOT REPORTED 01/05/2021    RBC 3.55 02/11/2021    TRICHOMONAS NOT REPORTED 01/05/2021    WBC 11.3 02/11/2021    YEAST NOT REPORTED 01/05/2021    TURBIDITY CLOUDY 01/05/2021     Lab Results   Component Value Date    CREATININE 0.44 02/11/2021    GLUCOSE 96 01/31/2021   you for allowing us to participate in the care of this patient. Please call with questions. Magi Peters MD  - Office: [phone]    Please note that this chart was generated using voice recognition Dragon dictation software. Although every effort was made to ensure the accuracy of this automated transcription, some errors in transcription mayhave occurred.

## 2021-02-19 ENCOUNTER — HOSPITAL ENCOUNTER (OUTPATIENT)
Dept: INFUSION THERAPY | Age: 56
Discharge: HOME | End: 2021-02-19
Payer: COMMERCIAL

## 2021-02-19 VITALS
HEART RATE: 94 BPM | SYSTOLIC BLOOD PRESSURE: 146 MMHG | RESPIRATION RATE: 18 BRPM | TEMPERATURE: 97.6 F | DIASTOLIC BLOOD PRESSURE: 79 MMHG | BODY MASS INDEX: 26.69 KG/M2 | WEIGHT: 160.4 LBS

## 2021-02-19 DIAGNOSIS — C56.9 MALIGNANT NEOPLASM OF OVARY, UNSPECIFIED LATERALITY (HCC): Primary | ICD-10-CM

## 2021-02-19 DIAGNOSIS — C79.51 CANCER, METASTATIC TO BONE (HCC): ICD-10-CM

## 2021-02-19 DIAGNOSIS — Z85.43 HISTORY OF MALIGNANT NEOPLASM OF OVARY: ICD-10-CM

## 2021-02-19 LAB
ABSOLUTE EOS #: 0.16 K/UL (ref 0–0.4)
ABSOLUTE IMMATURE GRANULOCYTE: ABNORMAL K/UL (ref 0–0.3)
ABSOLUTE LYMPH #: 1.94 K/UL (ref 1–4.8)
ABSOLUTE MONO #: 0.65 K/UL (ref 0.1–1.2)
ALBUMIN SERPL-MCNC: 3.8 G/DL (ref 3.5–5.2)
ALBUMIN/GLOBULIN RATIO: 1.4 (ref 1–2.5)
ALP BLD-CCNC: 81 U/L (ref 35–104)
ALT SERPL-CCNC: 18 U/L (ref 5–33)
ANION GAP SERPL CALCULATED.3IONS-SCNC: 11 MMOL/L (ref 9–17)
AST SERPL-CCNC: 23 U/L
BASOPHILS # BLD: 4 % (ref 0–2)
BASOPHILS ABSOLUTE: 0.32 K/UL (ref 0–0.2)
BILIRUB SERPL-MCNC: 0.23 MG/DL (ref 0.3–1.2)
BUN BLDV-MCNC: 7 MG/DL (ref 6–20)
BUN/CREAT BLD: ABNORMAL (ref 9–20)
CALCIUM SERPL-MCNC: 8.6 MG/DL (ref 8.6–10.4)
CHLORIDE BLD-SCNC: 101 MMOL/L (ref 98–107)
CO2: 22 MMOL/L (ref 20–31)
CREAT SERPL-MCNC: <0.4 MG/DL (ref 0.5–0.9)
DIFFERENTIAL TYPE: ABNORMAL
EOSINOPHILS RELATIVE PERCENT: 2 % (ref 1–4)
GFR AFRICAN AMERICAN: ABNORMAL ML/MIN
GFR NON-AFRICAN AMERICAN: ABNORMAL ML/MIN
GFR SERPL CREATININE-BSD FRML MDRD: ABNORMAL ML/MIN/{1.73_M2}
GFR SERPL CREATININE-BSD FRML MDRD: ABNORMAL ML/MIN/{1.73_M2}
GLUCOSE BLD-MCNC: 146 MG/DL (ref 70–99)
HCT VFR BLD CALC: 27.3 % (ref 36–46)
HEMOGLOBIN: 8.5 G/DL (ref 12–16)
IMMATURE GRANULOCYTES: ABNORMAL %
LYMPHOCYTES # BLD: 24 % (ref 24–44)
MCH RBC QN AUTO: 24.1 PG (ref 26–34)
MCHC RBC AUTO-ENTMCNC: 30.9 G/DL (ref 31–37)
MCV RBC AUTO: 77.8 FL (ref 80–100)
MONOCYTES # BLD: 8 % (ref 2–11)
MORPHOLOGY: ABNORMAL
NRBC AUTOMATED: ABNORMAL PER 100 WBC
PDW BLD-RTO: 21.9 % (ref 12.5–15.4)
PLATELET # BLD: 715 K/UL (ref 140–450)
PLATELET ESTIMATE: ABNORMAL
PMV BLD AUTO: 6.8 FL (ref 6–12)
POTASSIUM SERPL-SCNC: 3.6 MMOL/L (ref 3.7–5.3)
RBC # BLD: 3.51 M/UL (ref 4–5.2)
RBC # BLD: ABNORMAL 10*6/UL
SEG NEUTROPHILS: 62 % (ref 36–66)
SEGMENTED NEUTROPHILS ABSOLUTE COUNT: 5.03 K/UL (ref 1.8–7.7)
SODIUM BLD-SCNC: 134 MMOL/L (ref 135–144)
TOTAL PROTEIN: 6.5 G/DL (ref 6.4–8.3)
WBC # BLD: 8.1 K/UL (ref 3.5–11)
WBC # BLD: ABNORMAL 10*3/UL

## 2021-02-19 PROCEDURE — 80053 COMPREHEN METABOLIC PANEL: CPT

## 2021-02-19 PROCEDURE — 96375 TX/PRO/DX INJ NEW DRUG ADDON: CPT

## 2021-02-19 PROCEDURE — 96413 CHEMO IV INFUSION 1 HR: CPT

## 2021-02-19 PROCEDURE — 2580000003 HC RX 258: Performed by: INTERNAL MEDICINE

## 2021-02-19 PROCEDURE — 85025 COMPLETE CBC W/AUTO DIFF WBC: CPT

## 2021-02-19 PROCEDURE — 6360000002 HC RX W HCPCS: Performed by: INTERNAL MEDICINE

## 2021-02-19 RX ORDER — HEPARIN SODIUM (PORCINE) LOCK FLUSH IV SOLN 100 UNIT/ML 100 UNIT/ML
500 SOLUTION INTRAVENOUS PRN
Status: DISCONTINUED | OUTPATIENT
Start: 2021-02-19 | End: 2021-02-20 | Stop reason: HOSPADM

## 2021-02-19 RX ORDER — SODIUM CHLORIDE 9 MG/ML
20 INJECTION, SOLUTION INTRAVENOUS ONCE
Status: COMPLETED | OUTPATIENT
Start: 2021-02-19 | End: 2021-02-19

## 2021-02-19 RX ORDER — SODIUM CHLORIDE 0.9 % (FLUSH) 0.9 %
10 SYRINGE (ML) INJECTION PRN
Status: DISCONTINUED | OUTPATIENT
Start: 2021-02-19 | End: 2021-02-20 | Stop reason: HOSPADM

## 2021-02-19 RX ORDER — DEXAMETHASONE SODIUM PHOSPHATE 4 MG/ML
8 INJECTION, SOLUTION INTRA-ARTICULAR; INTRALESIONAL; INTRAMUSCULAR; INTRAVENOUS; SOFT TISSUE ONCE
Status: COMPLETED | OUTPATIENT
Start: 2021-02-19 | End: 2021-02-19

## 2021-02-19 RX ADMIN — GEMCITABINE 1910 MG: 38 INJECTION, SOLUTION INTRAVENOUS at 10:07

## 2021-02-19 RX ADMIN — SODIUM CHLORIDE, PRESERVATIVE FREE 10 ML: 5 INJECTION INTRAVENOUS at 09:03

## 2021-02-19 RX ADMIN — DEXAMETHASONE SODIUM PHOSPHATE 8 MG: 4 INJECTION, SOLUTION INTRAMUSCULAR; INTRAVENOUS at 09:51

## 2021-02-19 RX ADMIN — HEPARIN SODIUM (PORCINE) LOCK FLUSH IV SOLN 100 UNIT/ML 500 UNITS: 100 SOLUTION at 10:42

## 2021-02-19 RX ADMIN — SODIUM CHLORIDE 20 ML/HR: 9 INJECTION, SOLUTION INTRAVENOUS at 09:51

## 2021-02-19 RX ADMIN — SODIUM CHLORIDE, PRESERVATIVE FREE 10 ML: 5 INJECTION INTRAVENOUS at 10:42

## 2021-02-19 ASSESSMENT — PAIN DESCRIPTION - LOCATION: LOCATION: BACK

## 2021-02-19 ASSESSMENT — PAIN SCALES - GENERAL: PAINLEVEL_OUTOF10: 3

## 2021-02-26 ENCOUNTER — HOSPITAL ENCOUNTER (OUTPATIENT)
Dept: INFUSION THERAPY | Age: 56
Discharge: HOME | End: 2021-02-26
Payer: COMMERCIAL

## 2021-02-26 ENCOUNTER — TELEPHONE (OUTPATIENT)
Dept: ONCOLOGY | Age: 56
End: 2021-02-26

## 2021-02-26 ENCOUNTER — OFFICE VISIT (OUTPATIENT)
Dept: ONCOLOGY | Age: 56
End: 2021-02-26
Payer: COMMERCIAL

## 2021-02-26 VITALS
BODY MASS INDEX: 26.58 KG/M2 | WEIGHT: 159.7 LBS | DIASTOLIC BLOOD PRESSURE: 61 MMHG | TEMPERATURE: 98.7 F | RESPIRATION RATE: 16 BRPM | SYSTOLIC BLOOD PRESSURE: 109 MMHG | HEART RATE: 92 BPM

## 2021-02-26 DIAGNOSIS — Z85.43 HISTORY OF MALIGNANT NEOPLASM OF OVARY: ICD-10-CM

## 2021-02-26 DIAGNOSIS — C79.51 CANCER, METASTATIC TO BONE (HCC): ICD-10-CM

## 2021-02-26 DIAGNOSIS — C56.9 MALIGNANT NEOPLASM OF OVARY, UNSPECIFIED LATERALITY (HCC): Primary | ICD-10-CM

## 2021-02-26 LAB
ABSOLUTE EOS #: 0.09 K/UL (ref 0–0.4)
ABSOLUTE IMMATURE GRANULOCYTE: ABNORMAL K/UL (ref 0–0.3)
ABSOLUTE LYMPH #: 1.85 K/UL (ref 1–4.8)
ABSOLUTE MONO #: 0.34 K/UL (ref 0.1–1.2)
ALBUMIN SERPL-MCNC: 3.8 G/DL (ref 3.5–5.2)
ALBUMIN/GLOBULIN RATIO: 1.4 (ref 1–2.5)
ALP BLD-CCNC: 69 U/L (ref 35–104)
ALT SERPL-CCNC: 6 U/L (ref 5–33)
ANION GAP SERPL CALCULATED.3IONS-SCNC: 9 MMOL/L (ref 9–17)
AST SERPL-CCNC: 11 U/L
BASOPHILS # BLD: 1 % (ref 0–2)
BASOPHILS ABSOLUTE: 0.04 K/UL (ref 0–0.2)
BILIRUB SERPL-MCNC: 0.16 MG/DL (ref 0.3–1.2)
BUN BLDV-MCNC: 9 MG/DL (ref 6–20)
BUN/CREAT BLD: ABNORMAL (ref 9–20)
CALCIUM SERPL-MCNC: 8.9 MG/DL (ref 8.6–10.4)
CHLORIDE BLD-SCNC: 103 MMOL/L (ref 98–107)
CO2: 27 MMOL/L (ref 20–31)
CREAT SERPL-MCNC: <0.4 MG/DL (ref 0.5–0.9)
DIFFERENTIAL TYPE: ABNORMAL
EOSINOPHILS RELATIVE PERCENT: 2 % (ref 1–4)
GFR AFRICAN AMERICAN: ABNORMAL ML/MIN
GFR NON-AFRICAN AMERICAN: ABNORMAL ML/MIN
GFR SERPL CREATININE-BSD FRML MDRD: ABNORMAL ML/MIN/{1.73_M2}
GFR SERPL CREATININE-BSD FRML MDRD: ABNORMAL ML/MIN/{1.73_M2}
GLUCOSE BLD-MCNC: 108 MG/DL (ref 70–99)
HCT VFR BLD CALC: 24.3 % (ref 36–46)
HEMOGLOBIN: 7.4 G/DL (ref 12–16)
IMMATURE GRANULOCYTES: ABNORMAL %
LYMPHOCYTES # BLD: 43 % (ref 24–44)
MCH RBC QN AUTO: 23.4 PG (ref 26–34)
MCHC RBC AUTO-ENTMCNC: 30.2 G/DL (ref 31–37)
MCV RBC AUTO: 77.5 FL (ref 80–100)
MONOCYTES # BLD: 8 % (ref 2–11)
MORPHOLOGY: ABNORMAL
NRBC AUTOMATED: ABNORMAL PER 100 WBC
PDW BLD-RTO: 22.7 % (ref 12.5–15.4)
PLATELET # BLD: 449 K/UL (ref 140–450)
PLATELET ESTIMATE: ABNORMAL
PMV BLD AUTO: 6.3 FL (ref 6–12)
POTASSIUM SERPL-SCNC: 4.1 MMOL/L (ref 3.7–5.3)
RBC # BLD: 3.14 M/UL (ref 4–5.2)
RBC # BLD: ABNORMAL 10*6/UL
SEG NEUTROPHILS: 46 % (ref 36–66)
SEGMENTED NEUTROPHILS ABSOLUTE COUNT: 1.98 K/UL (ref 1.8–7.7)
SODIUM BLD-SCNC: 139 MMOL/L (ref 135–144)
TOTAL PROTEIN: 6.5 G/DL (ref 6.4–8.3)
WBC # BLD: 4.3 K/UL (ref 3.5–11)
WBC # BLD: ABNORMAL 10*3/UL

## 2021-02-26 PROCEDURE — 96375 TX/PRO/DX INJ NEW DRUG ADDON: CPT

## 2021-02-26 PROCEDURE — 99214 OFFICE O/P EST MOD 30 MIN: CPT | Performed by: INTERNAL MEDICINE

## 2021-02-26 PROCEDURE — 6360000002 HC RX W HCPCS: Performed by: INTERNAL MEDICINE

## 2021-02-26 PROCEDURE — 80053 COMPREHEN METABOLIC PANEL: CPT

## 2021-02-26 PROCEDURE — 99211 OFF/OP EST MAY X REQ PHY/QHP: CPT | Performed by: INTERNAL MEDICINE

## 2021-02-26 PROCEDURE — 2580000003 HC RX 258: Performed by: INTERNAL MEDICINE

## 2021-02-26 PROCEDURE — 96413 CHEMO IV INFUSION 1 HR: CPT

## 2021-02-26 PROCEDURE — 36591 DRAW BLOOD OFF VENOUS DEVICE: CPT

## 2021-02-26 PROCEDURE — 85025 COMPLETE CBC W/AUTO DIFF WBC: CPT

## 2021-02-26 RX ORDER — METHYLPREDNISOLONE SODIUM SUCCINATE 125 MG/2ML
125 INJECTION, POWDER, LYOPHILIZED, FOR SOLUTION INTRAMUSCULAR; INTRAVENOUS ONCE
Status: CANCELLED | OUTPATIENT
Start: 2021-03-19 | End: 2021-03-12

## 2021-02-26 RX ORDER — SODIUM CHLORIDE 9 MG/ML
20 INJECTION, SOLUTION INTRAVENOUS ONCE
Status: CANCELLED | OUTPATIENT
Start: 2021-03-19 | End: 2021-03-12

## 2021-02-26 RX ORDER — SODIUM CHLORIDE 9 MG/ML
INJECTION, SOLUTION INTRAVENOUS CONTINUOUS
Status: CANCELLED | OUTPATIENT
Start: 2021-03-26

## 2021-02-26 RX ORDER — METHYLPREDNISOLONE SODIUM SUCCINATE 125 MG/2ML
125 INJECTION, POWDER, LYOPHILIZED, FOR SOLUTION INTRAMUSCULAR; INTRAVENOUS ONCE
Status: CANCELLED | OUTPATIENT
Start: 2021-03-26 | End: 2021-03-19

## 2021-02-26 RX ORDER — SODIUM CHLORIDE 9 MG/ML
20 INJECTION, SOLUTION INTRAVENOUS ONCE
Status: CANCELLED | OUTPATIENT
Start: 2021-03-26 | End: 2021-03-19

## 2021-02-26 RX ORDER — EPINEPHRINE 1 MG/ML
0.3 INJECTION, SOLUTION, CONCENTRATE INTRAVENOUS PRN
Status: CANCELLED | OUTPATIENT
Start: 2021-03-19

## 2021-02-26 RX ORDER — METFORMIN HYDROCHLORIDE 500 MG/1
TABLET, EXTENDED RELEASE ORAL
Qty: 120 TABLET | Refills: 5 | Status: ON HOLD
Start: 2021-02-26 | End: 2021-06-23 | Stop reason: HOSPADM

## 2021-02-26 RX ORDER — SODIUM CHLORIDE 9 MG/ML
INJECTION, SOLUTION INTRAVENOUS CONTINUOUS
Status: CANCELLED | OUTPATIENT
Start: 2021-03-19

## 2021-02-26 RX ORDER — SODIUM CHLORIDE 0.9 % (FLUSH) 0.9 %
10 SYRINGE (ML) INJECTION PRN
Status: DISCONTINUED | OUTPATIENT
Start: 2021-02-26 | End: 2021-02-27 | Stop reason: HOSPADM

## 2021-02-26 RX ORDER — SODIUM CHLORIDE 0.9 % (FLUSH) 0.9 %
5 SYRINGE (ML) INJECTION PRN
Status: CANCELLED | OUTPATIENT
Start: 2021-03-26

## 2021-02-26 RX ORDER — HEPARIN SODIUM (PORCINE) LOCK FLUSH IV SOLN 100 UNIT/ML 100 UNIT/ML
500 SOLUTION INTRAVENOUS PRN
Status: CANCELLED | OUTPATIENT
Start: 2021-03-26

## 2021-02-26 RX ORDER — DIPHENHYDRAMINE HYDROCHLORIDE 50 MG/ML
50 INJECTION INTRAMUSCULAR; INTRAVENOUS ONCE
Status: CANCELLED | OUTPATIENT
Start: 2021-03-19 | End: 2021-03-12

## 2021-02-26 RX ORDER — EPINEPHRINE 1 MG/ML
0.3 INJECTION, SOLUTION, CONCENTRATE INTRAVENOUS PRN
Status: CANCELLED | OUTPATIENT
Start: 2021-03-26

## 2021-02-26 RX ORDER — HEPARIN SODIUM (PORCINE) LOCK FLUSH IV SOLN 100 UNIT/ML 100 UNIT/ML
500 SOLUTION INTRAVENOUS PRN
Status: DISCONTINUED | OUTPATIENT
Start: 2021-02-26 | End: 2021-02-27 | Stop reason: HOSPADM

## 2021-02-26 RX ORDER — HEPARIN SODIUM (PORCINE) LOCK FLUSH IV SOLN 100 UNIT/ML 100 UNIT/ML
500 SOLUTION INTRAVENOUS PRN
Status: CANCELLED | OUTPATIENT
Start: 2021-03-19

## 2021-02-26 RX ORDER — SODIUM CHLORIDE 0.9 % (FLUSH) 0.9 %
5 SYRINGE (ML) INJECTION PRN
Status: CANCELLED | OUTPATIENT
Start: 2021-03-19

## 2021-02-26 RX ORDER — SODIUM CHLORIDE 0.9 % (FLUSH) 0.9 %
10 SYRINGE (ML) INJECTION PRN
Status: CANCELLED | OUTPATIENT
Start: 2021-03-26

## 2021-02-26 RX ORDER — SODIUM CHLORIDE 9 MG/ML
20 INJECTION, SOLUTION INTRAVENOUS ONCE
Status: COMPLETED | OUTPATIENT
Start: 2021-02-26 | End: 2021-02-26

## 2021-02-26 RX ORDER — SODIUM CHLORIDE 0.9 % (FLUSH) 0.9 %
10 SYRINGE (ML) INJECTION PRN
Status: CANCELLED | OUTPATIENT
Start: 2021-03-19

## 2021-02-26 RX ORDER — DEXAMETHASONE SODIUM PHOSPHATE 4 MG/ML
8 INJECTION, SOLUTION INTRA-ARTICULAR; INTRALESIONAL; INTRAMUSCULAR; INTRAVENOUS; SOFT TISSUE ONCE
Status: COMPLETED | OUTPATIENT
Start: 2021-02-26 | End: 2021-02-26

## 2021-02-26 RX ORDER — DIPHENHYDRAMINE HYDROCHLORIDE 50 MG/ML
50 INJECTION INTRAMUSCULAR; INTRAVENOUS ONCE
Status: CANCELLED | OUTPATIENT
Start: 2021-03-26 | End: 2021-03-19

## 2021-02-26 RX ADMIN — GEMCITABINE 1910 MG: 38 INJECTION, SOLUTION INTRAVENOUS at 10:35

## 2021-02-26 RX ADMIN — DEXAMETHASONE SODIUM PHOSPHATE 8 MG: 4 INJECTION, SOLUTION INTRA-ARTICULAR; INTRALESIONAL; INTRAMUSCULAR; INTRAVENOUS; SOFT TISSUE at 09:51

## 2021-02-26 RX ADMIN — Medication 10 ML: at 09:44

## 2021-02-26 RX ADMIN — Medication 10 ML: at 11:09

## 2021-02-26 RX ADMIN — SODIUM CHLORIDE 20 ML/HR: 9 INJECTION, SOLUTION INTRAVENOUS at 09:44

## 2021-02-26 RX ADMIN — HEPARIN 500 UNITS: 100 SYRINGE at 11:09

## 2021-02-26 NOTE — TELEPHONE ENCOUNTER
AVS from 2/26/21    Proceed with chemotherapy as ordered after checking labs. RV 2 weeks.     Chemo as scheduled     RV scheduled 3/12/21 @ 8am w/ tx to follow    PT was given AVS and an appt schedule    Electronically signed by Flavio Frank on 2/26/2021 at 9:45 AM

## 2021-02-26 NOTE — FLOWSHEET NOTE
Writer encountered and visited briefly with Patient in the waiting area of the medical oncology clinic. Patient reported that she was \"tired\" but also doing well. She was joined by her son, Yovani, who is also a patient. Writer offered words of support and encouragement. Patient expressed gratitude. 02/26/21 [id number]   Encounter Summary   Services provided to: Patient   Referral/Consult From: [address] Visiting   (2/26/21)   Complexity of Encounter Low   Length of Encounter 15 minutes   Spiritual Assessment Completed Yes   Routine   Type Follow up   Assessment Calm; Approachable   Intervention Explored feelings, thoughts, concerns;Sustaining presence/ Ministry of presence   Outcome Expressed gratitude;Coping     Electronically signed by Neri Brown, Oncology Outpatient [facility], [address] [facility]  2/26/2021  9:07 AM

## 2021-02-26 NOTE — PROGRESS NOTES
Patient here for C1D8 Gemzar. Labs drawn from port and reviewed. Hgb 7.4 Dr. Sonny Rankin aware. She saw Dr. Sonny Rankin today see his dictation. She tolerated treatment well and was discharged home in stable condition. She is due to return 3/12 for C2D1.

## 2021-03-01 ENCOUNTER — TELEPHONE (OUTPATIENT)
Dept: ONCOLOGY | Age: 56
End: 2021-03-01

## 2021-03-01 LAB
CULTURE: NORMAL
DIRECT EXAM: NORMAL
Lab: NORMAL
SPECIMEN DESCRIPTION: NORMAL

## 2021-03-01 NOTE — TELEPHONE ENCOUNTER
Name: [facility]  : 1965  MRN: [id number]    Oncology Navigation Follow-Up Note    Contact Type:  Telephone  Notes: Spoke with pt for f/u call. Pt denied questions/concerns. Instructed pt may contact writer prn. Will continue to follow.     Electronically signed by Yadira Collins RN on 3/1/2021 at 12:01 PM

## 2021-03-07 DIAGNOSIS — C79.51 CANCER, METASTATIC TO BONE (HCC): ICD-10-CM

## 2021-03-07 DIAGNOSIS — M54.59 INTRACTABLE LOW BACK PAIN: ICD-10-CM

## 2021-03-07 NOTE — PROGRESS NOTES
smoking or alcohol drinking.l    INTERIM HISTORY:    patient is seen for follow up recurrent ovarian cancer. Patient received 1 cycle of palliative chemotherapy using Doxil/ Avastin. Patient was hospitalized at [facility] last week because of severe GI bleeding. She had multiple blood transfusions. She had GI and vascular evaluation. She had evidence of intra-abdominal mass at the splenic area with GI infiltration. Patient had embolization by vascular. Recently readmitted because of a seizure disorder. Images showed no brain mets. Currently seizures are controlled. Currently she feels better. No clear active bleeding. No melena or hematochezia. No hematemesis. Switched to Gemzar      PAST MEDICAL HISTORY: has a past medical history of Anemia, Bleeding, Cervical cancer ([facility] [address] Depression, Diabetes mellitus ([facility] [address] GERD (gastroesophageal reflux disease), Metastatic cancer ([facility] [address] Ovarian cancer ([facility] [address] Post chemo evaluation, and Splenic lesion. PAST SURGICAL HISTORY: has a past surgical history that includes Hysterectomy, total abdominal; Port Surgery; Tonsillectomy; IR PORT PLACEMENT > 5 YEARS (8/24/2020); Anus surgery; Abscess Drainage (2013); colectomy (03/2013); and IR EMBOLIZATION HEMORRHAGE (10/05/2020). CURRENT MEDICATIONS:  has a current medication list which includes the following prescription(s): metformin, lactobacillus, morphine, morphine, sertraline, oxycodone-acetaminophen, lisinopril, metoprolol succinate, levetiracetam, pantoprazole, and aspirin. ALLERGIES:  is allergic to ceftriaxone. FAMILY HISTORY: Negative for any hematological or oncological conditions. SOCIAL HISTORY:  reports that she has been smoking cigarettes. She has been smoking about 1.00 pack per day. She uses smokeless tobacco. She reports previous alcohol use. She reports that she does not use drugs. REVIEW OF SYSTEMS:     · General: No weakness or fatigue.  No unanticipated weight loss or decreased appetite. No fever or chills. · Eyes: No blurred vision, eye pain or double vision. · Ears: No hearing problems or drainage. No tinnitus. · Throat: No sore throat, problems with swallowing or dysphagia. · Respiratory: No cough, sputum or hemoptysis. No shortness of breath. No pleuritic chest pain. · Cardiovascular: No chest pain, orthopnea or PND. No lower extremity edema. No palpitation. · Gastrointestinal: As above. · Genitourinary: No dysuria, hematuria, frequency or urgency. · Musculoskeletal: No muscle aches or pains. No limitation of movement. No back pain. No gait disturbance, No joint complaints. · Dermatologic: No skin rashes or pruritus. No skin lesions or discolorations. · Psychiatric: No depression, anxiety, or stress or signs of schizophrenia. No change in mood or affect. · Hematologic: No history of bleeding tendency. No bruises or ecchymosis. No history of clotting problems. · Infectious disease: No fever, chills or frequent infections. · Endocrine: No polydipsia or polyuria. No temperature intolerance. · Neurologic: No headaches or dizziness. No weakness or numbness of the extremities. No changes in balance, coordination,  memory, mentation, behavior. · Allergic/Immunologic: No nasal congestion or hives. No repeated infections. PHYSICAL EXAM:  The patient is not in acute distress. Vital signs: Blood pressure 109/61, pulse 92, temperature 98.7 °F (37.1 °C), temperature source Oral, resp. rate 16, weight 159 lb 11.2 oz (72.4 kg).      General appearance - well appearing, not in pain or distress  Mental status - good mood, alert and oriented  Eyes - pupils equal and reactive, extraocular eye movements intact  Ears - bilateral TM's and external ear canals normal  Nose - normal and patent, no erythema, discharge or polyps  Mouth - mucous membranes moist, pharynx normal without lesions  Neck - supple, no significant adenopathy  Lymphatics - no palpable brain mets. Seizures controlled. We discussed resuming treatment. Discussed a different regimen. Patient agreed to Gemzar. benefits and side effects explained. I reviewed labs as stated above and discussed them with the patient. Labs are adequate for chemotherapy treatment. We will proceed with chemotherapy as planned with no adjustment. Patient was reminded about potential side effects to treatment. We will continue to monitor labs. Monitor toxicity and response to treatment  Patient's questions were answered to the best of her satisfaction and she verbalized full understanding and agreement.

## 2021-03-08 RX ORDER — OXYCODONE HYDROCHLORIDE AND ACETAMINOPHEN 5; 325 MG/1; MG/1
1 TABLET ORAL EVERY 4 HOURS PRN
Qty: 180 TABLET | Refills: 0 | Status: SHIPPED | OUTPATIENT
Start: 2021-03-08 | End: 2021-04-09 | Stop reason: SDUPTHER

## 2021-03-08 RX ORDER — MORPHINE SULFATE 30 MG/1
30 TABLET, FILM COATED, EXTENDED RELEASE ORAL 2 TIMES DAILY
Qty: 60 TABLET | Refills: 0 | Status: SHIPPED | OUTPATIENT
Start: 2021-03-08 | End: 2021-04-09 | Stop reason: SDUPTHER

## 2021-03-08 RX ORDER — MORPHINE SULFATE 15 MG/1
15 TABLET, FILM COATED, EXTENDED RELEASE ORAL 2 TIMES DAILY
Qty: 60 TABLET | Refills: 0 | Status: SHIPPED | OUTPATIENT
Start: 2021-03-08 | End: 2021-04-09 | Stop reason: SDUPTHER

## 2021-03-09 ENCOUNTER — HOSPITAL ENCOUNTER (INPATIENT)
Age: 56
LOS: 3 days | Discharge: HOME | DRG: 812 | End: 2021-03-12
Attending: EMERGENCY MEDICINE | Admitting: STUDENT IN AN ORGANIZED HEALTH CARE EDUCATION/TRAINING PROGRAM
Payer: COMMERCIAL

## 2021-03-09 ENCOUNTER — APPOINTMENT (OUTPATIENT)
Dept: CT IMAGING | Age: 56
DRG: 812 | End: 2021-03-09

## 2021-03-09 ENCOUNTER — APPOINTMENT (OUTPATIENT)
Dept: GENERAL RADIOLOGY | Age: 56
DRG: 812 | End: 2021-03-09

## 2021-03-09 DIAGNOSIS — C56.9 MALIGNANT NEOPLASM OF OVARY, UNSPECIFIED LATERALITY (HCC): ICD-10-CM

## 2021-03-09 DIAGNOSIS — R53.83 FATIGUE, UNSPECIFIED TYPE: Primary | ICD-10-CM

## 2021-03-09 DIAGNOSIS — D64.9 ANEMIA, UNSPECIFIED TYPE: ICD-10-CM

## 2021-03-09 LAB
ABSOLUTE EOS #: 0.1 K/UL (ref 0–0.4)
ABSOLUTE IMMATURE GRANULOCYTE: 0.1 K/UL (ref 0–0.3)
ABSOLUTE LYMPH #: 1.39 K/UL (ref 1–4.8)
ABSOLUTE MONO #: 1.49 K/UL (ref 0.1–0.8)
ALBUMIN SERPL-MCNC: 4 G/DL (ref 3.5–5.2)
ALBUMIN/GLOBULIN RATIO: 1.2 (ref 1–2.5)
ALP BLD-CCNC: 76 U/L (ref 35–104)
ALT SERPL-CCNC: 9 U/L (ref 5–33)
ANION GAP SERPL CALCULATED.3IONS-SCNC: 17 MMOL/L (ref 9–17)
AST SERPL-CCNC: 22 U/L
BASOPHILS # BLD: 0 % (ref 0–2)
BASOPHILS ABSOLUTE: 0 K/UL (ref 0–0.2)
BILIRUB SERPL-MCNC: <0.1 MG/DL (ref 0.3–1.2)
BUN BLDV-MCNC: 11 MG/DL (ref 6–20)
BUN/CREAT BLD: ABNORMAL (ref 9–20)
CALCIUM SERPL-MCNC: 9.2 MG/DL (ref 8.6–10.4)
CHLORIDE BLD-SCNC: 96 MMOL/L (ref 98–107)
CO2: 22 MMOL/L (ref 20–31)
CREAT SERPL-MCNC: 0.47 MG/DL (ref 0.5–0.9)
DIFFERENTIAL TYPE: ABNORMAL
EOSINOPHILS RELATIVE PERCENT: 1 % (ref 1–4)
GFR AFRICAN AMERICAN: >60 ML/MIN
GFR NON-AFRICAN AMERICAN: >60 ML/MIN
GFR SERPL CREATININE-BSD FRML MDRD: ABNORMAL ML/MIN/{1.73_M2}
GFR SERPL CREATININE-BSD FRML MDRD: ABNORMAL ML/MIN/{1.73_M2}
GLUCOSE BLD-MCNC: 108 MG/DL (ref 70–99)
GLUCOSE BLD-MCNC: 87 MG/DL (ref 65–105)
HCT VFR BLD CALC: 24.5 % (ref 36.3–47.1)
HEMOGLOBIN: 7.2 G/DL (ref 11.9–15.1)
IMMATURE GRANULOCYTES: 1 %
LYMPHOCYTES # BLD: 14 % (ref 24–44)
MCH RBC QN AUTO: 22.6 PG (ref 25.2–33.5)
MCHC RBC AUTO-ENTMCNC: 29.4 G/DL (ref 28.4–34.8)
MCV RBC AUTO: 76.8 FL (ref 82.6–102.9)
MONOCYTES # BLD: 15 % (ref 1–7)
MORPHOLOGY: ABNORMAL
NRBC AUTOMATED: 0.8 PER 100 WBC
NUCLEATED RED BLOOD CELLS: 1 PER 100 WBC
PDW BLD-RTO: 21.1 % (ref 11.8–14.4)
PLATELET # BLD: 761 K/UL (ref 138–453)
PLATELET ESTIMATE: ABNORMAL
PMV BLD AUTO: 10 FL (ref 8.1–13.5)
POTASSIUM SERPL-SCNC: 4.1 MMOL/L (ref 3.7–5.3)
RBC # BLD: 3.19 M/UL (ref 3.95–5.11)
RBC # BLD: ABNORMAL 10*6/UL
SEG NEUTROPHILS: 69 % (ref 36–66)
SEGMENTED NEUTROPHILS ABSOLUTE COUNT: 6.82 K/UL (ref 1.8–7.7)
SODIUM BLD-SCNC: 135 MMOL/L (ref 135–144)
TOTAL PROTEIN: 7.4 G/DL (ref 6.4–8.3)
TROPONIN INTERP: NORMAL
TROPONIN T: NORMAL NG/ML
TROPONIN, HIGH SENSITIVITY: 8 NG/L (ref 0–14)
WBC # BLD: 9.9 K/UL (ref 3.5–11.3)
WBC # BLD: ABNORMAL 10*3/UL

## 2021-03-09 PROCEDURE — 96374 THER/PROPH/DIAG INJ IV PUSH: CPT

## 2021-03-09 PROCEDURE — 85025 COMPLETE CBC W/AUTO DIFF WBC: CPT

## 2021-03-09 PROCEDURE — 71260 CT THORAX DX C+: CPT

## 2021-03-09 PROCEDURE — 93005 ELECTROCARDIOGRAM TRACING: CPT | Performed by: STUDENT IN AN ORGANIZED HEALTH CARE EDUCATION/TRAINING PROGRAM

## 2021-03-09 PROCEDURE — 86870 RBC ANTIBODY IDENTIFICATION: CPT

## 2021-03-09 PROCEDURE — 2580000003 HC RX 258: Performed by: NURSE PRACTITIONER

## 2021-03-09 PROCEDURE — 3209999900 CT THORACIC SPINE TRAUMA RECONSTRUCTION

## 2021-03-09 PROCEDURE — 6360000002 HC RX W HCPCS: Performed by: STUDENT IN AN ORGANIZED HEALTH CARE EDUCATION/TRAINING PROGRAM

## 2021-03-09 PROCEDURE — 86850 RBC ANTIBODY SCREEN: CPT

## 2021-03-09 PROCEDURE — 86901 BLOOD TYPING SEROLOGIC RH(D): CPT

## 2021-03-09 PROCEDURE — 3209999900 CT LUMBAR SPINE TRAUMA RECONSTRUCTION

## 2021-03-09 PROCEDURE — 96376 TX/PRO/DX INJ SAME DRUG ADON: CPT

## 2021-03-09 PROCEDURE — 80053 COMPREHEN METABOLIC PANEL: CPT

## 2021-03-09 PROCEDURE — 99222 1ST HOSP IP/OBS MODERATE 55: CPT | Performed by: NURSE PRACTITIONER

## 2021-03-09 PROCEDURE — 86920 COMPATIBILITY TEST SPIN: CPT

## 2021-03-09 PROCEDURE — 6370000000 HC RX 637 (ALT 250 FOR IP): Performed by: NURSE PRACTITIONER

## 2021-03-09 PROCEDURE — 86900 BLOOD TYPING SEROLOGIC ABO: CPT

## 2021-03-09 PROCEDURE — 70470 CT HEAD/BRAIN W/O & W/DYE: CPT

## 2021-03-09 PROCEDURE — 99285 EMERGENCY DEPT VISIT HI MDM: CPT

## 2021-03-09 PROCEDURE — 2580000003 HC RX 258: Performed by: STUDENT IN AN ORGANIZED HEALTH CARE EDUCATION/TRAINING PROGRAM

## 2021-03-09 PROCEDURE — 6360000004 HC RX CONTRAST MEDICATION: Performed by: STUDENT IN AN ORGANIZED HEALTH CARE EDUCATION/TRAINING PROGRAM

## 2021-03-09 PROCEDURE — 71045 X-RAY EXAM CHEST 1 VIEW: CPT

## 2021-03-09 PROCEDURE — 84443 ASSAY THYROID STIM HORMONE: CPT

## 2021-03-09 PROCEDURE — 84484 ASSAY OF TROPONIN QUANT: CPT

## 2021-03-09 PROCEDURE — 86880 COOMBS TEST DIRECT: CPT

## 2021-03-09 PROCEDURE — 96361 HYDRATE IV INFUSION ADD-ON: CPT

## 2021-03-09 PROCEDURE — 72125 CT NECK SPINE W/O DYE: CPT

## 2021-03-09 PROCEDURE — P9016 RBC LEUKOCYTES REDUCED: HCPCS

## 2021-03-09 PROCEDURE — 96375 TX/PRO/DX INJ NEW DRUG ADDON: CPT

## 2021-03-09 PROCEDURE — 1200000000 HC SEMI PRIVATE

## 2021-03-09 PROCEDURE — 36430 TRANSFUSION BLD/BLD COMPNT: CPT

## 2021-03-09 PROCEDURE — 82947 ASSAY GLUCOSE BLOOD QUANT: CPT

## 2021-03-09 RX ORDER — ONDANSETRON 2 MG/ML
4 INJECTION INTRAMUSCULAR; INTRAVENOUS ONCE
Status: COMPLETED | OUTPATIENT
Start: 2021-03-09 | End: 2021-03-09

## 2021-03-09 RX ORDER — LISINOPRIL 2.5 MG/1
2.5 TABLET ORAL DAILY
Status: DISCONTINUED | OUTPATIENT
Start: 2021-03-10 | End: 2021-03-12 | Stop reason: HOSPADM

## 2021-03-09 RX ORDER — SODIUM CHLORIDE 9 MG/ML
INJECTION, SOLUTION INTRAVENOUS CONTINUOUS
Status: DISCONTINUED | OUTPATIENT
Start: 2021-03-09 | End: 2021-03-12 | Stop reason: HOSPADM

## 2021-03-09 RX ORDER — SODIUM CHLORIDE 9 MG/ML
INJECTION, SOLUTION INTRAVENOUS PRN
Status: DISCONTINUED | OUTPATIENT
Start: 2021-03-09 | End: 2021-03-12 | Stop reason: HOSPADM

## 2021-03-09 RX ORDER — ATORVASTATIN CALCIUM 80 MG/1
40 TABLET, FILM COATED ORAL NIGHTLY
Status: DISCONTINUED | OUTPATIENT
Start: 2021-03-09 | End: 2021-03-12 | Stop reason: HOSPADM

## 2021-03-09 RX ORDER — MORPHINE SULFATE 15 MG/1
15 TABLET, FILM COATED, EXTENDED RELEASE ORAL 2 TIMES DAILY
Status: DISCONTINUED | OUTPATIENT
Start: 2021-03-09 | End: 2021-03-12 | Stop reason: HOSPADM

## 2021-03-09 RX ORDER — ONDANSETRON 2 MG/ML
4 INJECTION INTRAMUSCULAR; INTRAVENOUS EVERY 6 HOURS PRN
Status: DISCONTINUED | OUTPATIENT
Start: 2021-03-09 | End: 2021-03-12 | Stop reason: HOSPADM

## 2021-03-09 RX ORDER — PROMETHAZINE HYDROCHLORIDE 12.5 MG/1
12.5 TABLET ORAL EVERY 6 HOURS PRN
Status: DISCONTINUED | OUTPATIENT
Start: 2021-03-09 | End: 2021-03-12 | Stop reason: HOSPADM

## 2021-03-09 RX ORDER — SODIUM CHLORIDE 0.9 % (FLUSH) 0.9 %
10 SYRINGE (ML) INJECTION EVERY 12 HOURS SCHEDULED
Status: DISCONTINUED | OUTPATIENT
Start: 2021-03-09 | End: 2021-03-12 | Stop reason: HOSPADM

## 2021-03-09 RX ORDER — SODIUM CHLORIDE 0.9 % (FLUSH) 0.9 %
10 SYRINGE (ML) INJECTION PRN
Status: DISCONTINUED | OUTPATIENT
Start: 2021-03-09 | End: 2021-03-12 | Stop reason: HOSPADM

## 2021-03-09 RX ORDER — POTASSIUM CHLORIDE 20 MEQ/1
40 TABLET, EXTENDED RELEASE ORAL PRN
Status: DISCONTINUED | OUTPATIENT
Start: 2021-03-09 | End: 2021-03-12 | Stop reason: HOSPADM

## 2021-03-09 RX ORDER — POTASSIUM CHLORIDE 7.45 MG/ML
10 INJECTION INTRAVENOUS PRN
Status: DISCONTINUED | OUTPATIENT
Start: 2021-03-09 | End: 2021-03-12 | Stop reason: HOSPADM

## 2021-03-09 RX ORDER — NITROGLYCERIN 0.4 MG/1
0.4 TABLET SUBLINGUAL EVERY 5 MIN PRN
Status: DISCONTINUED | OUTPATIENT
Start: 2021-03-09 | End: 2021-03-12 | Stop reason: HOSPADM

## 2021-03-09 RX ORDER — ASPIRIN 81 MG/1
81 TABLET, CHEWABLE ORAL NIGHTLY
Status: DISCONTINUED | OUTPATIENT
Start: 2021-03-09 | End: 2021-03-12 | Stop reason: HOSPADM

## 2021-03-09 RX ORDER — ACETAMINOPHEN 325 MG/1
650 TABLET ORAL EVERY 6 HOURS PRN
Status: DISCONTINUED | OUTPATIENT
Start: 2021-03-09 | End: 2021-03-12 | Stop reason: HOSPADM

## 2021-03-09 RX ORDER — OXYCODONE HYDROCHLORIDE AND ACETAMINOPHEN 5; 325 MG/1; MG/1
1 TABLET ORAL EVERY 4 HOURS PRN
Status: DISCONTINUED | OUTPATIENT
Start: 2021-03-09 | End: 2021-03-12 | Stop reason: HOSPADM

## 2021-03-09 RX ORDER — 0.9 % SODIUM CHLORIDE 0.9 %
1000 INTRAVENOUS SOLUTION INTRAVENOUS ONCE
Status: COMPLETED | OUTPATIENT
Start: 2021-03-09 | End: 2021-03-09

## 2021-03-09 RX ORDER — MORPHINE SULFATE 30 MG/1
30 TABLET, FILM COATED, EXTENDED RELEASE ORAL 2 TIMES DAILY
Status: DISCONTINUED | OUTPATIENT
Start: 2021-03-09 | End: 2021-03-12 | Stop reason: HOSPADM

## 2021-03-09 RX ORDER — ACETAMINOPHEN 650 MG/1
650 SUPPOSITORY RECTAL EVERY 6 HOURS PRN
Status: DISCONTINUED | OUTPATIENT
Start: 2021-03-09 | End: 2021-03-12 | Stop reason: HOSPADM

## 2021-03-09 RX ORDER — LEVETIRACETAM 500 MG/1
1500 TABLET ORAL 2 TIMES DAILY
Status: DISCONTINUED | OUTPATIENT
Start: 2021-03-09 | End: 2021-03-12 | Stop reason: HOSPADM

## 2021-03-09 RX ORDER — DIPHENHYDRAMINE HYDROCHLORIDE 50 MG/ML
INJECTION INTRAMUSCULAR; INTRAVENOUS
Status: DISPENSED
Start: 2021-03-09 | End: 2021-03-10

## 2021-03-09 RX ORDER — LACTOBACILLUS RHAMNOSUS GG 10B CELL
1 CAPSULE ORAL DAILY
Status: DISCONTINUED | OUTPATIENT
Start: 2021-03-10 | End: 2021-03-12 | Stop reason: HOSPADM

## 2021-03-09 RX ORDER — PANTOPRAZOLE SODIUM 40 MG/1
40 TABLET, DELAYED RELEASE ORAL 2 TIMES DAILY
Status: DISCONTINUED | OUTPATIENT
Start: 2021-03-09 | End: 2021-03-12 | Stop reason: HOSPADM

## 2021-03-09 RX ORDER — MAGNESIUM SULFATE 1 G/100ML
1000 INJECTION INTRAVENOUS PRN
Status: DISCONTINUED | OUTPATIENT
Start: 2021-03-09 | End: 2021-03-12 | Stop reason: HOSPADM

## 2021-03-09 RX ORDER — NICOTINE POLACRILEX 4 MG
15 LOZENGE BUCCAL PRN
Status: DISCONTINUED | OUTPATIENT
Start: 2021-03-09 | End: 2021-03-12 | Stop reason: HOSPADM

## 2021-03-09 RX ORDER — DEXTROSE MONOHYDRATE 50 MG/ML
100 INJECTION, SOLUTION INTRAVENOUS PRN
Status: DISCONTINUED | OUTPATIENT
Start: 2021-03-09 | End: 2021-03-12 | Stop reason: HOSPADM

## 2021-03-09 RX ORDER — DEXTROSE MONOHYDRATE 25 G/50ML
12.5 INJECTION, SOLUTION INTRAVENOUS PRN
Status: DISCONTINUED | OUTPATIENT
Start: 2021-03-09 | End: 2021-03-12 | Stop reason: HOSPADM

## 2021-03-09 RX ORDER — METOPROLOL SUCCINATE 25 MG/1
25 TABLET, EXTENDED RELEASE ORAL DAILY
Status: DISCONTINUED | OUTPATIENT
Start: 2021-03-10 | End: 2021-03-12 | Stop reason: HOSPADM

## 2021-03-09 RX ORDER — DIPHENHYDRAMINE HYDROCHLORIDE 50 MG/ML
12.5 INJECTION INTRAMUSCULAR; INTRAVENOUS ONCE
Status: COMPLETED | OUTPATIENT
Start: 2021-03-09 | End: 2021-03-09

## 2021-03-09 RX ORDER — FENTANYL CITRATE 50 UG/ML
50 INJECTION, SOLUTION INTRAMUSCULAR; INTRAVENOUS ONCE
Status: COMPLETED | OUTPATIENT
Start: 2021-03-09 | End: 2021-03-09

## 2021-03-09 RX ORDER — MORPHINE SULFATE 4 MG/ML
4 INJECTION, SOLUTION INTRAMUSCULAR; INTRAVENOUS ONCE
Status: DISCONTINUED | OUTPATIENT
Start: 2021-03-09 | End: 2021-03-09

## 2021-03-09 RX ADMIN — Medication 12.5 MG: at 17:35

## 2021-03-09 RX ADMIN — ASPIRIN 81 MG: 81 TABLET, CHEWABLE ORAL at 22:58

## 2021-03-09 RX ADMIN — MORPHINE SULFATE 30 MG: 30 TABLET, EXTENDED RELEASE ORAL at 22:59

## 2021-03-09 RX ADMIN — ONDANSETRON 4 MG: 2 INJECTION INTRAMUSCULAR; INTRAVENOUS at 17:28

## 2021-03-09 RX ADMIN — MORPHINE SULFATE 15 MG: 30 TABLET, EXTENDED RELEASE ORAL at 22:57

## 2021-03-09 RX ADMIN — PANTOPRAZOLE SODIUM 40 MG: 40 TABLET, DELAYED RELEASE ORAL at 21:40

## 2021-03-09 RX ADMIN — IOPAMIDOL 100 ML: 755 INJECTION, SOLUTION INTRAVENOUS at 19:33

## 2021-03-09 RX ADMIN — FENTANYL CITRATE 50 MCG: 50 INJECTION, SOLUTION INTRAMUSCULAR; INTRAVENOUS at 18:48

## 2021-03-09 RX ADMIN — SODIUM CHLORIDE 1000 ML: 9 INJECTION, SOLUTION INTRAVENOUS at 17:28

## 2021-03-09 RX ADMIN — LEVETIRACETAM 1500 MG: 500 TABLET, FILM COATED ORAL at 21:40

## 2021-03-09 RX ADMIN — ATORVASTATIN CALCIUM 40 MG: 80 TABLET, FILM COATED ORAL at 22:58

## 2021-03-09 RX ADMIN — SODIUM CHLORIDE: 9 INJECTION, SOLUTION INTRAVENOUS at 22:59

## 2021-03-09 RX ADMIN — SODIUM CHLORIDE: 9 INJECTION, SOLUTION INTRAVENOUS at 23:56

## 2021-03-09 RX ADMIN — SODIUM CHLORIDE, PRESERVATIVE FREE 10 ML: 5 INJECTION INTRAVENOUS at 22:58

## 2021-03-09 RX ADMIN — FENTANYL CITRATE 50 MCG: 50 INJECTION, SOLUTION INTRAMUSCULAR; INTRAVENOUS at 17:28

## 2021-03-09 ASSESSMENT — ENCOUNTER SYMPTOMS
BLOOD IN STOOL: 0
DIARRHEA: 0
SHORTNESS OF BREATH: 0
STRIDOR: 0
WHEEZING: 0
NAUSEA: 1
ABDOMINAL PAIN: 0
BACK PAIN: 1
COUGH: 0
VOMITING: 0
SORE THROAT: 0
CONSTIPATION: 0

## 2021-03-09 ASSESSMENT — PAIN DESCRIPTION - PROGRESSION: CLINICAL_PROGRESSION: GRADUALLY WORSENING

## 2021-03-09 ASSESSMENT — PAIN SCALES - GENERAL
PAINLEVEL_OUTOF10: 7
PAINLEVEL_OUTOF10: 6

## 2021-03-09 ASSESSMENT — PAIN DESCRIPTION - ONSET: ONSET: PROGRESSIVE

## 2021-03-09 ASSESSMENT — PAIN DESCRIPTION - LOCATION
LOCATION: BACK
LOCATION: BACK

## 2021-03-09 ASSESSMENT — PAIN DESCRIPTION - DESCRIPTORS: DESCRIPTORS: ACHING

## 2021-03-09 ASSESSMENT — PAIN DESCRIPTION - ORIENTATION: ORIENTATION: LEFT

## 2021-03-09 ASSESSMENT — PAIN DESCRIPTION - PAIN TYPE: TYPE: ACUTE PAIN

## 2021-03-09 NOTE — ED NOTES
Dr. Kelly Miller at bedside. Pt with itching and redness to arm right after receiving fentanyl and zofran. Pt given iv benadryl. Denies shortness of breath. No airway involvement. Pt remains on full monitor.       Ana Thompson RN  03/09/21 [id number]

## 2021-03-09 NOTE — ED NOTES
Pt arrives via private auto with daughter. Pt currently undergoing chemo for ovarian cancer. Pt also with myra drain to upper right abdomen due to abscess on spleen. Pt with c/o fatigue x four days with decreased appetite and having upper back pain. Pt also with hx of low hemoglobin, last hgb was 7.0. pt alert and oriented but is feeling \"very run down. \" pt on po morphine and percocet at home for her pain.       Munir Rust RN  03/09/21 [id number]

## 2021-03-09 NOTE — ED PROVIDER NOTES
[facility]     Emergency Department     Faculty Note/ Attestation      Pt Name: Gama Arevalo                                       MRN: [id number]  [place] 1965  Date of evaluation: 3/9/2021    Patients PCP:    No primary care provider on file. Attestation  I performed a history and physical examination of the patient and discussed management with the resident. I reviewed the residents note and agree with the documented findings and plan of care. Any areas of disagreement are noted on the chart. I was personally present for the key portions of any procedures. I have documented in the chart those procedures where I was not present during the key portions. I have reviewed the emergency nurses triage note. I agree with the chief complaint, past medical history, past surgical history, allergies, medications, social and family history as documented unless otherwise noted below. For Physician Assistant/ Nurse Practitioner cases/documentation I have personally evaluated this patient and have completed at least one if not all key elements of the E/M (history, physical exam, and MDM). Additional findings are as noted. Initial Screens:             Vitals:    Vitals:    03/09/21 1644 03/09/21 1653 03/09/21 1707   BP:  (!) 94/58 (!) 105/56   Pulse:  108 99   Resp:  16 15   Temp: 97.1 °F (36.2 °C)     TempSrc: Infrared     SpO2:  94% 98%   Weight:  159 lb (72.1 kg) 159 lb (72.1 kg)   Height:  5' 5\" (1.651 m)        CHIEF COMPLAINT       Chief Complaint   Patient presents with    Fatigue     c/o weakness with heart pounding, pt states was told at her last chemo Tx hgb was 7, pt also c/o shoulder and back pain     Back Pain       The pt is a 59 YO F with ovarian cancer with metastesis. The pt has been on chemo therapy for several months at this point. The pt has metastesis to multiple locations. The pt feeling very fatigued and weaker than typical during the chemo therapy.   No

## 2021-03-09 NOTE — ED PROVIDER NOTES
[address] [place] [address] ED  Emergency Department Encounter  Emergency Medicine Resident     Pt Name: Asaf Kennedy  MRN: [id number]  [place] 1965  Date of evaluation: 3/9/21  PCP:  No primary care provider on file. CHIEF COMPLAINT       Chief Complaint   Patient presents with    Fatigue     c/o weakness with heart pounding, pt states was told at her last chemo Tx hgb was 7, pt also c/o shoulder and back pain     Back Pain       HISTORY OFPRESENT ILLNESS  (Location/Symptom, Timing/Onset, Context/Setting, Quality, Duration, Modifying Factors,Severity.)      Asaf Kennedy is a 60 y. o.yo female who presents with generalized body weakness. Patient has a history of ovarian cancer, on chemotherapy. Daughter lives in the room she states that due to chemotherapy, her mother's hemoglobin dropped significantly to where she needs transfusion. Last treatment was 2/26/2021. Patient states for period of 3 to 4 days she has been having body weakness, with left shoulder pain that radiates to her back. Denies any chest pain or shortness of breath. However she does have a history of DVT in which she is on Eliquis for, was diagnosed with DVT last month. States that she has had decreased appetite, with nausea but no vomiting or changes in her stool. Patient states that she has an appointment with her oncologist on Friday however but she is feeling way too weak to wait till Friday to see him. Patient denies any urinary dysuria or frequency. PAST MEDICAL / SURGICAL / SOCIAL / FAMILY HISTORY      has a past medical history of Anemia, Bleeding, Cervical cancer ([address] Depression, Diabetes mellitus ([address] GERD (gastroesophageal reflux disease), Metastatic cancer ([address] Ovarian cancer ([address] Post chemo evaluation, and Splenic lesion. has a past surgical history that includes Hysterectomy, total abdominal; Port Surgery; Tonsillectomy; IR PORT PLACEMENT > 5 YEARS (8/24/2020); Anus surgery;  Abscess Drainage (2013); colectomy (03/2013); and IR EMBOLIZATION HEMORRHAGE (10/05/2020). Social History     Socioeconomic History    Marital status: Single     Spouse name: Not on file    Number of children: Not on file    Years of education: Not on file    Highest education level: Not on file   Occupational History    Not on file   Social Needs    Financial resource strain: Not on file    Food insecurity     Worry: Not on file     Inability: Not on file    Transportation needs     Medical: Not on file     Non-medical: Not on file   Tobacco Use    Smoking status: Current Every Day Smoker     Packs/day: 1.00     Types: Cigarettes    Smokeless tobacco: Current User   Substance and Sexual Activity    Alcohol use: Not Currently    Drug use: Never    Sexual activity: Not on file   Lifestyle    Physical activity     Days per week: Not on file     Minutes per session: Not on file    Stress: Not on file   Relationships    Social connections     Talks on phone: Not on file     Gets together: Not on file     Attends Shinto service: Not on file     Active member of club or organization: Not on file     Attends meetings of clubs or organizations: Not on file     Relationship status: Not on file    Intimate partner violence     Fear of current or ex partner: Not on file     Emotionally abused: Not on file     Physically abused: Not on file     Forced sexual activity: Not on file   Other Topics Concern    Not on file   Social History Narrative    Not on file       Family History   Problem Relation Age of Onset    Alcohol Abuse Mother     Cirrhosis Mother         Allergies:  Ceftriaxone    Home Medications:  Prior to Admission medications    Medication Sig Start Date End Date Taking?  Authorizing Provider   oxyCODONE-acetaminophen (PERCOCET) 5-325 MG per tablet TAKE 1 TABLET BY MOUTH EVERY 4 HOURS AS NEEDED FOR PAIN (BREAKTHROUGH PAIN) FOR UP TO 30 DAYS. 3/8/21 4/7/21  Elaine Lopez MD   morphine (MS CONTIN) 15 4, 8 or more forlevel 5)      INITIAL VITALS:   ED Triage Vitals   BP Temp Temp Source Pulse Resp SpO2 Height Weight   03/09/21 1653 03/09/21 1644 03/09/21 1644 03/09/21 1653 03/09/21 1653 03/09/21 1653 03/09/21 1653 03/09/21 1653   (!) 94/58 97.1 °F (36.2 °C) Infrared 108 16 94 % 5' 5\" (1.651 m) 159 lb (72.1 kg)       Physical Exam  Constitutional:       Appearance: She is normal weight. She is ill-appearing. HENT:      Head: Normocephalic and atraumatic. Mouth/Throat:      Mouth: Mucous membranes are moist.   Eyes:      Extraocular Movements: Extraocular movements intact. Pupils: Pupils are equal, round, and reactive to light. Neck:      Musculoskeletal: No neck rigidity or muscular tenderness. Cardiovascular:      Rate and Rhythm: Regular rhythm. Tachycardia present. Pulmonary:      Effort: Pulmonary effort is normal. No respiratory distress. Breath sounds: Normal breath sounds. Abdominal:      General: There is no distension. Palpations: Abdomen is soft. Tenderness: There is no abdominal tenderness. Skin:     General: Skin is warm. Capillary Refill: Capillary refill takes less than 2 seconds. Coloration: Skin is pale. Neurological:      General: No focal deficit present. Mental Status: She is alert and oriented to person, place, and time.    Psychiatric:      Comments: Pt tearful in room         DIFFERENTIAL  DIAGNOSIS     PLAN (LABS / IMAGING / EKG):  Orders Placed This Encounter   Procedures    Culture, Urine    XR CHEST PORTABLE    CT CERVICAL SPINE WO CONTRAST    CT THORACIC SPINE TRAUMA RECONSTRUCTION    CT LUMBAR SPINE TRAUMA RECONSTRUCTION    CT CHEST ABDOMEN PELVIS W CONTRAST    CT HEAD W WO CONTRAST    CBC Auto Differential    Comprehensive Metabolic Panel w/ Reflex to MG    Troponin    Urinalysis with microscopic    Hemoglobin and Hematocrit, Blood, Post Transfusion    VITAL SIGNS PER TRANSFUSION PROTOCOL    Verify hospital blood Immature Granulocytes 1 (*)     Seg Neutrophils 69 (*)     Lymphocytes 14 (*)     Monocytes 15 (*)     nRBC 1 (*)     Absolute Mono # 1.49 (*)     All other components within normal limits   COMPREHENSIVE METABOLIC PANEL W/ REFLEX TO MG FOR LOW K - Abnormal; Notable for the following components:    Glucose 108 (*)     CREATININE 0.47 (*)     Chloride 96 (*)     Total Bilirubin <0.10 (*)     All other components within normal limits   CULTURE, URINE   TROPONIN   URINALYSIS WITH MICROSCOPIC   TYPE AND SCREEN   PREPARE RBC (CROSSMATCH)         RADIOLOGY:  Ct Head W Wo Contrast    Result Date: 3/9/2021  EXAMINATION: CT OF THE HEAD WITH AND WITHOUT CONTRAST  3/9/2021 7:22 pm TECHNIQUE: CT of the head/brain was performed without and with the administration of intravenous contrast. Multiplanar reformatted images are provided for review. Dose modulation, iterative reconstruction, and/or weight based adjustment of the mA/kV was utilized to reduce the radiation dose to as low as reasonably achievable. COMPARISON: None. HISTORY: ORDERING SYSTEM PROVIDED HISTORY: History ovarian cancer with mets, rule out mass TECHNOLOGIST PROVIDED HISTORY: History ovarian cancer with mets, rule out mass Reason for Exam: r/o mets; ovarian CA Acuity: Unknown Type of Exam: Unknown FINDINGS: BRAIN/VENTRICLES: There is no acute intracranial hemorrhage, mass effect or midline shift. No abnormal extra-axial fluid collection. The gray-white differentiation is maintained without evidence of an acute infarct. There is no evidence of hydrocephalus. No abnormal enhancement on postcontrast imaging. ORBITS: The visualized portion of the orbits demonstrate no acute abnormality. SINUSES: The visualized paranasal sinuses and mastoid air cells demonstrate no acute abnormality. SOFT TISSUES/SKULL:  No acute abnormality of the visualized skull or soft tissues. No acute intracranial abnormality.      Ct Cervical Spine Wo Contrast    Result Date: 3/9/2021 EXAMINATION: CT OF THE CERVICAL SPINE WITHOUT CONTRAST 3/9/2021 7:22 pm TECHNIQUE: CT of the cervical spine was performed without the administration of intravenous contrast. Multiplanar reformatted images are provided for review. Dose modulation, iterative reconstruction, and/or weight based adjustment of the mA/kV was utilized to reduce the radiation dose to as low as reasonably achievable. COMPARISON: None. HISTORY: ORDERING SYSTEM PROVIDED HISTORY: Worsening weakness, shoulder pain, rule out fracture TECHNOLOGIST PROVIDED HISTORY: Worsening weakness, shoulder pain, rule out fracture Decision Support Exception->Emergency Medical Condition (MA) Reason for Exam: pain Acuity: Unknown Type of Exam: Unknown FINDINGS: BONES/ALIGNMENT: There is no acute fracture or traumatic malalignment. There is straightening of the normal cervical lordosis. There are small lucent lesions within multiple cervical vertebral bodies. DEGENERATIVE CHANGES: Mild, multilevel degenerative changes of the cervical spine. SOFT TISSUES: There is no prevertebral soft tissue swelling. 1. No acute fracture. 2. Straightening of the normal cervical lordosis, which can be positional or related to muscle spasm. 3. Small lucent lesions within multiple cervical vertebral bodies are indeterminate. MRI with and without contrast may be useful further characterization. Xr Chest Portable    Result Date: 3/9/2021  EXAMINATION: ONE XRAY VIEW OF THE CHEST 3/9/2021 5:14 pm COMPARISON: None. HISTORY: ORDERING SYSTEM PROVIDED HISTORY: weakness TECHNOLOGIST PROVIDED HISTORY: weakness Reason for Exam: weakness FINDINGS: There is a pigtail catheter in the left upper quadrant. There is  vague density in the retrocardiac space. Possible small effusion versus infiltrate cannot be excluded. No evidence for pneumothorax. Vague density retrocardiac space of uncertain etiology. No evidence of pneumothorax.      Ct Lumbar Spine Trauma Reconstruction    Result Date: 3/9/2021  EXAMINATION: CT OF THE LUMBAR SPINE WITHOUT CONTRAST  3/9/2021 TECHNIQUE: CT of the lumbar spine was performed without the administration of intravenous contrast. Multiplanar reformatted images are provided for review. Dose modulation, iterative reconstruction, and/or weight based adjustment of the mA/kV was utilized to reduce the radiation dose to as low as reasonably achievable. COMPARISON: January 28, 2021 HISTORY: ORDERING SYSTEM PROVIDED HISTORY: Worsening weakness, shoulder pain, rule out fracture TECHNOLOGIST PROVIDED HISTORY: Worsening weakness, shoulder pain, rule out fracture Reason for Exam: r/o fx Acuity: Unknown Type of Exam: Unknown Relevant Medical/Surgical History: hx ovarian ca with mets FINDINGS: BONES/ALIGNMENT: There is stable alignment of the spine. The vertebral body heights are maintained. No osseous destructive lesion is seen. Stable appearance to blastic lesions at T12 the L1-L5 and L3. No evidence for fracture. DEGENERATIVE CHANGES: No significant degenerative changes of the lumbar spine. SOFT TISSUES/RETROPERITONEUM: No paraspinal mass is seen. Stable appearance CT lumbar spine when compared to prior study     Ct Thoracic Spine Trauma Reconstruction    Result Date: 3/9/2021  EXAMINATION: CT OF THE THORACIC SPINE WITHOUT CONTRAST  3/9/2021 7:23 pm: TECHNIQUE: CT of the thoracic spine was performed without the administration of intravenous contrast. Multiplanar reformatted images are provided for review. Dose modulation, iterative reconstruction, and/or weight based adjustment of the mA/kV was utilized to reduce the radiation dose to as low as reasonably achievable.  COMPARISON: January 28, 2021, January 5, 2021 HISTORY: ORDERING SYSTEM PROVIDED HISTORY: Worsening weakness, shoulder pain, rule out fracture TECHNOLOGIST PROVIDED HISTORY: Worsening weakness, shoulder pain, rule out fracture Reason for Exam: r/o fx Type of Exam: Unknown Relevant Medical/Surgical History: hx ovarian ca with mets FINDINGS: BONES/ALIGNMENT: There is normal alignment of the spine. The vertebral body heights are maintained. No osseous destructive lesion is seen. .  Stable appearance to blastic appearing lesions at L1 and T12. DEGENERATIVE CHANGES: No gross spinal canal stenosis or bony neural foraminal narrowing of the thoracic spine. SOFT TISSUES: No paraspinal mass is seen. Unremarkable CT of the thoracic spine. EKG  EKG Interpretation    Interpreted by me    Rhythm: normal sinus   Rate: normal  Axis: normal  Ectopy: none  Conduction: normal  ST Segments: no acute change  T Waves: no acute change  Q Waves: none    Clinical Impression: no acute changes and normal EKG    All EKG's are interpreted by the Emergency Department Physicianwho either signs or Co-signs this chart in the absence of a cardiologist.    EMERGENCY DEPARTMENT COURSE:  ED Course as of Mar 09 2006   Tue Mar 09, 2021   6111 Given ACS symptoms patient is presenting with, with a hemoglobin of 7.2, will transfuse 1 prbc. Patient is CT of right now. Will have discussion of benefits with patient. Plan to admit patient with either palliative consult and hospice consult.    [AN]   2000 CT cervical demonstrates small lucent lesions within multiple cervical vertebral bodies are  Indeterminate. CT thoracic/lumbar demonstrates stable appearance to blastic lesions at T12 the L1-L5 and L3. Awaiting CT chest abdomen pelvis. [facility] will admit patient.      [AN]      ED Course User Index  [AN] Mari Loredo MD          PROCEDURES:  None    CONSULTS:  IP CONSULT TO PALLIATIVE CARE  IP CONSULT TO HOSPITALIST    CRITICAL CARE:      FINAL IMPRESSION      1. Fatigue, unspecified type    2. Malignant neoplasm of ovary, unspecified laterality ([address]    3. Anemia, unspecified type          DISPOSITION / PLAN     DISPOSITION        PATIENT REFERRED TO:  No follow-up provider specified.     DISCHARGE MEDICATIONS:  New Prescriptions    No medications on file       Maricruz Bray MD  Emergency Medicine Resident    (Please note that portions of this note were completed with a voice recognition program.Efforts were made to edit the dictations but occasionally words are mis-transcribed.)        Maricruz Bray MD  Resident  03/09/21 2010

## 2021-03-10 LAB
-: NORMAL
ALBUMIN SERPL-MCNC: 3.3 G/DL (ref 3.5–5.2)
ALBUMIN/GLOBULIN RATIO: 1.1 (ref 1–2.5)
ALP BLD-CCNC: 66 U/L (ref 35–104)
ALT SERPL-CCNC: 7 U/L (ref 5–33)
AMORPHOUS: NORMAL
ANION GAP SERPL CALCULATED.3IONS-SCNC: 12 MMOL/L (ref 9–17)
AST SERPL-CCNC: 13 U/L
BACTERIA: NORMAL
BILIRUB SERPL-MCNC: 0.2 MG/DL (ref 0.3–1.2)
BILIRUBIN URINE: NEGATIVE
BUN BLDV-MCNC: 8 MG/DL (ref 6–20)
BUN/CREAT BLD: ABNORMAL (ref 9–20)
CALCIUM SERPL-MCNC: 8.5 MG/DL (ref 8.6–10.4)
CASTS UA: NORMAL /LPF (ref 0–8)
CHLORIDE BLD-SCNC: 104 MMOL/L (ref 98–107)
CHOLESTEROL/HDL RATIO: 4
CHOLESTEROL: 131 MG/DL
CO2: 23 MMOL/L (ref 20–31)
COLOR: YELLOW
CREAT SERPL-MCNC: 0.4 MG/DL (ref 0.5–0.9)
CRYSTALS, UA: NORMAL /HPF
DATE, STOOL #1: NORMAL
DATE, STOOL #2: NORMAL
DATE, STOOL #3: NORMAL
EKG ATRIAL RATE: 100 BPM
EKG ATRIAL RATE: 80 BPM
EKG P AXIS: 54 DEGREES
EKG P AXIS: 64 DEGREES
EKG P-R INTERVAL: 148 MS
EKG P-R INTERVAL: 180 MS
EKG Q-T INTERVAL: 310 MS
EKG Q-T INTERVAL: 402 MS
EKG QRS DURATION: 82 MS
EKG QRS DURATION: 84 MS
EKG QTC CALCULATION (BAZETT): 399 MS
EKG QTC CALCULATION (BAZETT): 463 MS
EKG R AXIS: 27 DEGREES
EKG R AXIS: 31 DEGREES
EKG T AXIS: 109 DEGREES
EKG T AXIS: 71 DEGREES
EKG VENTRICULAR RATE: 100 BPM
EKG VENTRICULAR RATE: 80 BPM
EPITHELIAL CELLS UA: NORMAL /HPF (ref 0–5)
ESTIMATED AVERAGE GLUCOSE: 126 MG/DL
GFR AFRICAN AMERICAN: >60 ML/MIN
GFR NON-AFRICAN AMERICAN: >60 ML/MIN
GFR SERPL CREATININE-BSD FRML MDRD: ABNORMAL ML/MIN/{1.73_M2}
GFR SERPL CREATININE-BSD FRML MDRD: ABNORMAL ML/MIN/{1.73_M2}
GLUCOSE BLD-MCNC: 125 MG/DL (ref 65–105)
GLUCOSE BLD-MCNC: 144 MG/DL (ref 65–105)
GLUCOSE BLD-MCNC: 75 MG/DL (ref 70–99)
GLUCOSE BLD-MCNC: 85 MG/DL (ref 65–105)
GLUCOSE BLD-MCNC: 96 MG/DL (ref 65–105)
GLUCOSE URINE: NEGATIVE
HBA1C MFR BLD: 6 % (ref 4–6)
HCT VFR BLD CALC: 24.7 % (ref 36.3–47.1)
HCT VFR BLD CALC: 25.6 % (ref 36.3–47.1)
HCT VFR BLD CALC: 25.7 % (ref 36.3–47.1)
HCT VFR BLD CALC: 26.4 % (ref 36.3–47.1)
HCT VFR BLD CALC: 27.1 % (ref 36.3–47.1)
HDLC SERPL-MCNC: 33 MG/DL
HEMOCCULT SP1 STL QL: NEGATIVE
HEMOCCULT SP2 STL QL: NORMAL
HEMOCCULT SP3 STL QL: NORMAL
HEMOGLOBIN: 7.1 G/DL (ref 11.9–15.1)
HEMOGLOBIN: 7.3 G/DL (ref 11.9–15.1)
HEMOGLOBIN: 7.4 G/DL (ref 11.9–15.1)
HEMOGLOBIN: 7.5 G/DL (ref 11.9–15.1)
HEMOGLOBIN: 7.6 G/DL (ref 11.9–15.1)
KETONES, URINE: NEGATIVE
LDL CHOLESTEROL: 69 MG/DL (ref 0–130)
LEUKOCYTE ESTERASE, URINE: NEGATIVE
MAGNESIUM: 1.9 MG/DL (ref 1.6–2.6)
MCH RBC QN AUTO: 23.9 PG (ref 25.2–33.5)
MCHC RBC AUTO-ENTMCNC: 29.2 G/DL (ref 28.4–34.8)
MCV RBC AUTO: 81.8 FL (ref 82.6–102.9)
MUCUS: NORMAL
NITRITE, URINE: NEGATIVE
NRBC AUTOMATED: 1 PER 100 WBC
OTHER OBSERVATIONS UA: NORMAL
PDW BLD-RTO: 20.1 % (ref 11.8–14.4)
PH UA: 6 (ref 5–8)
PLATELET # BLD: 769 K/UL (ref 138–453)
PMV BLD AUTO: 9.9 FL (ref 8.1–13.5)
POTASSIUM SERPL-SCNC: 4.2 MMOL/L (ref 3.7–5.3)
PROTEIN UA: NEGATIVE
RBC # BLD: 3.14 M/UL (ref 3.95–5.11)
RBC UA: NORMAL /HPF (ref 0–4)
RENAL EPITHELIAL, UA: NORMAL /HPF
SODIUM BLD-SCNC: 139 MMOL/L (ref 135–144)
SPECIFIC GRAVITY UA: 1.02 (ref 1–1.03)
TIME, STOOL #1: NORMAL
TIME, STOOL #2: NORMAL
TIME, STOOL #3: NORMAL
TOTAL PROTEIN: 6.4 G/DL (ref 6.4–8.3)
TRICHOMONAS: NORMAL
TRIGL SERPL-MCNC: 147 MG/DL
TROPONIN INTERP: NORMAL
TROPONIN T: NORMAL NG/ML
TROPONIN, HIGH SENSITIVITY: 10 NG/L (ref 0–14)
TSH SERPL DL<=0.05 MIU/L-ACNC: 2.93 MIU/L (ref 0.3–5)
TURBIDITY: CLEAR
URINE HGB: NEGATIVE
UROBILINOGEN, URINE: NORMAL
VLDLC SERPL CALC-MCNC: ABNORMAL MG/DL (ref 1–30)
WBC # BLD: 7.8 K/UL (ref 3.5–11.3)
WBC UA: NORMAL /HPF (ref 0–5)
YEAST: NORMAL

## 2021-03-10 PROCEDURE — 99255 IP/OBS CONSLTJ NEW/EST HI 80: CPT | Performed by: INTERNAL MEDICINE

## 2021-03-10 PROCEDURE — 83735 ASSAY OF MAGNESIUM: CPT

## 2021-03-10 PROCEDURE — 93010 ELECTROCARDIOGRAM REPORT: CPT | Performed by: INTERNAL MEDICINE

## 2021-03-10 PROCEDURE — G0328 FECAL BLOOD SCRN IMMUNOASSAY: HCPCS

## 2021-03-10 PROCEDURE — 84484 ASSAY OF TROPONIN QUANT: CPT

## 2021-03-10 PROCEDURE — 83036 HEMOGLOBIN GLYCOSYLATED A1C: CPT

## 2021-03-10 PROCEDURE — 94760 N-INVAS EAR/PLS OXIMETRY 1: CPT

## 2021-03-10 PROCEDURE — 82947 ASSAY GLUCOSE BLOOD QUANT: CPT

## 2021-03-10 PROCEDURE — 2580000003 HC RX 258: Performed by: STUDENT IN AN ORGANIZED HEALTH CARE EDUCATION/TRAINING PROGRAM

## 2021-03-10 PROCEDURE — 80053 COMPREHEN METABOLIC PANEL: CPT

## 2021-03-10 PROCEDURE — 2580000003 HC RX 258: Performed by: NURSE PRACTITIONER

## 2021-03-10 PROCEDURE — 93005 ELECTROCARDIOGRAM TRACING: CPT | Performed by: NURSE PRACTITIONER

## 2021-03-10 PROCEDURE — 87086 URINE CULTURE/COLONY COUNT: CPT

## 2021-03-10 PROCEDURE — 81001 URINALYSIS AUTO W/SCOPE: CPT

## 2021-03-10 PROCEDURE — 93970 EXTREMITY STUDY: CPT

## 2021-03-10 PROCEDURE — 80061 LIPID PANEL: CPT

## 2021-03-10 PROCEDURE — 85018 HEMOGLOBIN: CPT

## 2021-03-10 PROCEDURE — 99232 SBSQ HOSP IP/OBS MODERATE 35: CPT | Performed by: STUDENT IN AN ORGANIZED HEALTH CARE EDUCATION/TRAINING PROGRAM

## 2021-03-10 PROCEDURE — 36415 COLL VENOUS BLD VENIPUNCTURE: CPT

## 2021-03-10 PROCEDURE — 6370000000 HC RX 637 (ALT 250 FOR IP): Performed by: NURSE PRACTITIONER

## 2021-03-10 PROCEDURE — 85014 HEMATOCRIT: CPT

## 2021-03-10 PROCEDURE — 1200000000 HC SEMI PRIVATE

## 2021-03-10 PROCEDURE — 85027 COMPLETE CBC AUTOMATED: CPT

## 2021-03-10 RX ADMIN — ATORVASTATIN CALCIUM 40 MG: 80 TABLET, FILM COATED ORAL at 20:37

## 2021-03-10 RX ADMIN — PANTOPRAZOLE SODIUM 40 MG: 40 TABLET, DELAYED RELEASE ORAL at 08:49

## 2021-03-10 RX ADMIN — OXYCODONE HYDROCHLORIDE AND ACETAMINOPHEN 1 TABLET: 5; 325 TABLET ORAL at 16:19

## 2021-03-10 RX ADMIN — OXYCODONE HYDROCHLORIDE AND ACETAMINOPHEN 1 TABLET: 5; 325 TABLET ORAL at 22:04

## 2021-03-10 RX ADMIN — MORPHINE SULFATE 15 MG: 30 TABLET, EXTENDED RELEASE ORAL at 20:39

## 2021-03-10 RX ADMIN — SODIUM CHLORIDE 100 ML/HR: 9 INJECTION, SOLUTION INTRAVENOUS at 09:45

## 2021-03-10 RX ADMIN — SODIUM CHLORIDE: 9 INJECTION, SOLUTION INTRAVENOUS at 22:38

## 2021-03-10 RX ADMIN — OXYCODONE HYDROCHLORIDE AND ACETAMINOPHEN 1 TABLET: 5; 325 TABLET ORAL at 07:29

## 2021-03-10 RX ADMIN — LEVETIRACETAM 1500 MG: 500 TABLET, FILM COATED ORAL at 20:36

## 2021-03-10 RX ADMIN — SERTRALINE 100 MG: 50 TABLET, FILM COATED ORAL at 08:49

## 2021-03-10 RX ADMIN — Medication 1 CAPSULE: at 08:49

## 2021-03-10 RX ADMIN — ACETAMINOPHEN 650 MG: 325 TABLET ORAL at 00:07

## 2021-03-10 RX ADMIN — MORPHINE SULFATE 15 MG: 30 TABLET, EXTENDED RELEASE ORAL at 08:50

## 2021-03-10 RX ADMIN — OXYCODONE HYDROCHLORIDE AND ACETAMINOPHEN 1 TABLET: 5; 325 TABLET ORAL at 12:01

## 2021-03-10 RX ADMIN — PANTOPRAZOLE SODIUM 40 MG: 40 TABLET, DELAYED RELEASE ORAL at 20:38

## 2021-03-10 RX ADMIN — ASPIRIN 81 MG: 81 TABLET, CHEWABLE ORAL at 20:37

## 2021-03-10 RX ADMIN — MORPHINE SULFATE 30 MG: 30 TABLET, EXTENDED RELEASE ORAL at 08:49

## 2021-03-10 RX ADMIN — LEVETIRACETAM 1500 MG: 500 TABLET, FILM COATED ORAL at 08:48

## 2021-03-10 RX ADMIN — OXYCODONE HYDROCHLORIDE AND ACETAMINOPHEN 1 TABLET: 5; 325 TABLET ORAL at 01:39

## 2021-03-10 RX ADMIN — MORPHINE SULFATE 30 MG: 30 TABLET, EXTENDED RELEASE ORAL at 20:38

## 2021-03-10 RX ADMIN — INSULIN LISPRO 1 UNITS: 100 INJECTION, SOLUTION INTRAVENOUS; SUBCUTANEOUS at 20:39

## 2021-03-10 ASSESSMENT — PAIN DESCRIPTION - PROGRESSION
CLINICAL_PROGRESSION: NOT CHANGED

## 2021-03-10 ASSESSMENT — PAIN SCALES - GENERAL
PAINLEVEL_OUTOF10: 7
PAINLEVEL_OUTOF10: 4
PAINLEVEL_OUTOF10: 7
PAINLEVEL_OUTOF10: 5
PAINLEVEL_OUTOF10: 5
PAINLEVEL_OUTOF10: 7
PAINLEVEL_OUTOF10: 3
PAINLEVEL_OUTOF10: 3

## 2021-03-10 ASSESSMENT — ENCOUNTER SYMPTOMS
NAUSEA: 0
SHORTNESS OF BREATH: 0
CHEST TIGHTNESS: 0
ABDOMINAL PAIN: 0

## 2021-03-10 ASSESSMENT — PAIN DESCRIPTION - FREQUENCY
FREQUENCY: CONTINUOUS
FREQUENCY: INTERMITTENT

## 2021-03-10 ASSESSMENT — PAIN DESCRIPTION - ORIENTATION
ORIENTATION: MID
ORIENTATION: MID

## 2021-03-10 ASSESSMENT — PAIN DESCRIPTION - PAIN TYPE
TYPE: ACUTE PAIN
TYPE: ACUTE PAIN

## 2021-03-10 ASSESSMENT — PAIN DESCRIPTION - LOCATION: LOCATION: ABDOMEN

## 2021-03-10 NOTE — PROGRESS NOTES
[facility]  Office: [address], DO, Jurgen Velázquez, DO, Aysha Cedillo, DO, [facility], DO, Ruel Shin MD, Mayra Smith MD, Dunia Maldonado MD, Tan Moe MD, Fernando Atkins MD, Aayush Barnett MD, Juanita Mo MD, Ranjeet Prakash MD, Mena Loya MD, Lucas Pizarro, DO, Lyla Castleman, MD, German Garcia DO, Indra Carrington MD,  Libby Childers DO, Grayson Santizo MD, Estuardo Escalona MD, Nancy Reyez, [facility], [address], CNP, Homa Phelps, CNP, Cheli Odom, CNS, Jerry Kaminski, [facility], Angel Clements, [facility], Ousmane Hunter, CNP, Dayday Torres, CNP, Andrew Sandhu, CNP, Dayami Hampton PA-C, Brynn Kelly, [facility], Jd Santiago, CNP, Sara Cornelius, CNP, Gary Guzman, CNP, Ondina Grover, CNP, Kaitlin Schwab, CNP, Vaishali Leblanc, [address] [place] [address]    Progress Note    3/10/2021    4:13 PM    Name:   Brandy Renee  MRN:     [id number]     Acct:      [de-identified]   Room:   [address] [place] [address] Day:  1  Admit Date:  3/9/2021  4:56 PM    PCP:   No primary care provider on file. Code Status:  Full Code    Subjective:     C/C:   Chief Complaint   Patient presents with    Fatigue     c/o weakness with heart pounding, pt states was told at her last chemo Tx hgb was 7, pt also c/o shoulder and back pain     Back Pain     Interval History Status: not changed. Patient seen examined bedside. Hemoglobin stable however still low. Currently 7.3 on review patient generally around 10.4. Discussed with patient about DVT. States that she was found to have a DVT on her left side confirmed as outpatient. Was started on Eliquis in January of this year. Brief History:     52-year-old female with past medical history of recurrent ovarian cancer originally diagnosed in 2005 with multiple recurrences and diffuse metastasis presents with fatigue found to be anemic and was transfused with 1 unit of red blood cells.   Patient currently also being treated for DVT found on January 26, 2021 with Eliquis as outpatient. Has significant past medical history of intra-abdominal bleeding due to splenic mass status post coiling and embolization at outlying facility in October 2020. Review of Systems:     Constitutional:  negative for chills, fevers, sweats  Respiratory:  negative for cough, dyspnea on exertion, shortness of breath, wheezing  Cardiovascular:  negative for chest pain, chest pressure/discomfort, lower extremity edema, palpitations  Gastrointestinal:  negative for abdominal pain, constipation, diarrhea, nausea, vomiting  Neurological:  negative for dizziness, headache    Medications: Allergies:     Allergies   Allergen Reactions    Ceftriaxone      Had a rash on ceftriaxone December 2020, [facility]       Current Meds:   Scheduled Meds:    aspirin  81 mg Oral Nightly    lactobacillus  1 capsule Oral Daily    levETIRAcetam  1,500 mg Oral BID    lisinopril  2.5 mg Oral Daily    metoprolol succinate  25 mg Oral Daily    pantoprazole  40 mg Oral BID    sertraline  100 mg Oral Daily    sodium chloride flush  10 mL Intravenous 2 times per day    atorvastatin  40 mg Oral Nightly    insulin lispro  0-6 Units Subcutaneous TID WC    insulin lispro  0-3 Units Subcutaneous Nightly    morphine  15 mg Oral BID    morphine  30 mg Oral BID     Continuous Infusions:    sodium chloride      dextrose      sodium chloride 75 mL/hr at 03/10/21 1427     PRN Meds: sodium chloride, glucose, dextrose, glucagon (rDNA), dextrose, sodium chloride flush, promethazine **OR** ondansetron, acetaminophen **OR** acetaminophen, magnesium hydroxide, potassium chloride **OR** potassium alternative oral replacement **OR** potassium chloride, potassium chloride, magnesium sulfate, nitroGLYCERIN, oxyCODONE-acetaminophen    Data:     Past Medical History:   has a past medical history of Anemia, Bleeding, Cervical cancer ([facility] Depression, Diabetes mellitus ([facility] GERD (gastroesophageal reflux disease), Metastatic cancer ([facility] Post chemo evaluation, and Splenic lesion. Social History:   reports that she has been smoking cigarettes. She has been smoking about 1.00 pack per day. She uses smokeless tobacco. She reports previous alcohol use. She reports that she does not use drugs. Family History:   Family History   Problem Relation Age of Onset    Alcohol Abuse Mother     Cirrhosis Mother        Vitals:  BP (!) 98/55   Pulse 89   Temp 98.1 °F (36.7 °C) (Oral)   Resp 16   Ht 5' 5\" (1.651 m)   Wt 158 lb 8 oz (71.9 kg)   SpO2 95%   BMI 26.38 kg/m²   Temp (24hrs), Av.8 °F (37.1 °C), Min:97.1 °F (36.2 °C), Max:100.9 °F (38.3 °C)    Recent Labs     21  2211 03/10/21  0705 03/10/21  1138   POCGLU 87 85 125*       I/O (24Hr): Intake/Output Summary (Last 24 hours) at 3/10/2021 1613  Last data filed at 3/10/2021 0543  Gross per 24 hour   Intake 1317.67 ml   Output 660 ml   Net 657.67 ml       Labs:  Hematology:  Recent Labs     21  1743 21  1743 03/10/21  0440 03/10/21  0809 03/10/21  1402   WBC 9.9  --  7.8  --   --    RBC 3.19*  --  3.14*  --   --    HGB 7.2*   < > 7.5* 7.1* 7.3*   HCT 24.5*   < > 25.7* 24.7* 25.6*   MCV 76.8*  --  81.8*  --   --    MCH 22.6*  --  23.9*  --   --    MCHC 29.4  --  29.2  --   --    RDW 21.1*  --  20.1*  --   --    *  --  769*  --   --    MPV 10.0  --  9.9  --   --     < > = values in this interval not displayed.      Chemistry:  Recent Labs     21  1743 03/10/21  0218 03/10/21  0440     --  139   K 4.1  --  4.2   CL 96*  --  104   CO2 22  --  23   GLUCOSE 108*  --  75   BUN 11  --  8   CREATININE 0.47*  --  0.40*   MG  --   --  1.9   ANIONGAP 17  --  12   LABGLOM >60  --  >60   GFRAA >60  --  >60   CALCIUM 9.2  --  8.5*   TROPHS 8 10  --      Recent Labs     21  1743 21  2211 03/10/21  0218 03/10/21  0440 03/10/21  0705 03/10/21  1138 PROT 7.4  --   --  6.4  --   --    LABALBU 4.0  --   --  3.3*  --   --    LABA1C  --   --  6.0  --   --   --    TSH 2.93  --   --   --   --   --    AST 22  --   --  13  --   --    ALT 9  --   --  7  --   --    ALKPHOS 76  --   --  66  --   --    BILITOT <0.10*  --   --  0.20*  --   --    CHOL  --   --   --  131  --   --    HDL  --   --   --  33*  --   --    LDLCHOLESTEROL  --   --   --  69  --   --    CHOLHDLRATIO  --   --   --  4.0  --   --    TRIG  --   --   --  147  --   --    VLDL  --   --   --  NOT REPORTED  --   --    POCGLU  --  87  --   --  85 125*     ABG:  Lab Results   Component Value Date    POCPH 7.428 01/06/2021    POCPCO2 43.0 01/06/2021    POCPO2 98.9 01/06/2021    POCHCO3 28.4 01/06/2021    NBEA NOT REPORTED 01/06/2021    PBEA 4 01/06/2021    TGA7TFD 30 01/06/2021    FWJX7QRX 98 01/06/2021    FIO2 30.0 01/06/2021     Lab Results   Component Value Date/Time    SPECIAL RT HAND 15ML 01/28/2021 11:55 PM     Lab Results   Component Value Date/Time    CULTURE NO GROWTH 6 DAYS 01/28/2021 11:55 PM       Radiology:  Ct Head W Wo Contrast    Result Date: 3/9/2021  No acute intracranial abnormality. Ct Cervical Spine Wo Contrast    Result Date: 3/9/2021  1. No acute fracture. 2. Straightening of the normal cervical lordosis, which can be positional or related to muscle spasm. 3. Small lucent lesions within multiple cervical vertebral bodies are indeterminate. MRI with and without contrast may be useful further characterization. Xr Chest Portable    Result Date: 3/9/2021  Vague density retrocardiac space of uncertain etiology. No evidence of pneumothorax. Ct Chest Abdomen Pelvis W Contrast    Result Date: 3/9/2021  Interval increase in number of subcentimeter pulmonary nodules throughout the lungs bilaterally compatible with progressive metastatic disease. Resolving left lower lobe infiltrate and left pleural effusion. Small right pleural effusion Stable moderate pericardial effusion. . Stable mediastinal, hilar, mesenteric, retroperitoneal, pelvic and left inguinal lymphadenopathy compatible with metastatic disease. Stable osteoblastic lesions in the spine. Mildly improved left lower lobe infiltrate and left pleural effusion Stable pleural/extrapleural mass along the anterior right chest wall near the manubriosternal junction Mild ascites Evidence of constipation Stable pigtail catheter in the complex splenic mass. No other significant changes are seen compared to the previous exams     Ct Lumbar Spine Trauma Reconstruction    Result Date: 3/9/2021  Stable appearance CT lumbar spine when compared to prior study     Ct Thoracic Spine Trauma Reconstruction    Result Date: 3/9/2021  Unremarkable CT of the thoracic spine. Physical Examination:        General appearance: Chronically ill-appearing, cooperative  Mental Status:  oriented to person, place and time and normal affect  Lungs:  clear to auscultation bilaterally, normal effort  Heart:  regular rate and rhythm, no murmur  Abdomen:  soft, nontender, nondistended, normal bowel sounds  Extremities:  No edema, redness, tenderness in the calves  Skin:  no gross lesions, rashes, induration    Assessment:        Hospital Problems           Last Modified POA    * (Principal) Anemia 3/9/2021 Yes    Malignant neoplasm of ovary ([address] 3/9/2021 Yes    Seizure ([address] 3/9/2021 Yes    Type 2 diabetes mellitus without complication, without long-term current use of insulin ([address] 3/9/2021 Yes    Cancer, metastatic to bone ([address] 3/9/2021 Yes    Essential hypertension 3/9/2021 Yes          Plan:        1. Acute anemia secondary to Eliquis use as well as recent chemotherapy due to recurrent malignant neoplasm with metastasis and recurrences of ovarian cancer. Appreciate hematology oncology recommendations. Hold Eliquis, monitor hemoglobin hematocrit, transfuse if less than 7. Consult to vascular surgery for possible IVC filter  2. Hypertension.   Coronary artery disease. Aspirin 81 mg, Lipitor 40 mg, hold lisinopril as blood pressure is borderline continue Toprol-XL 25 mg,   3. chronic pain continue home medications of MS Contin  4. Seizure prophylaxis with Keppra  5. Depression. Continue Zoloft  6. Continue gentle IV hydration, consult to dietary. 7. Discussed with the patient as well as discussed with son who was on the phone during examination. We will try to obtain lower extremity Dopplers from outpatient. In the interim we will order new lower extremity Dopplers.     Bernadine Maya MD  3/10/2021  4:13 PM

## 2021-03-10 NOTE — ED PROVIDER NOTES
[facility] ED  Emergency Department  Senior Resident Attestation     Primary Care Physician  No primary care provider on file. I performed a history and physical examination of the patient and discussed management with the pita resident. I reviewed the pita residents note and agree with the documented findings and plan of care. Any areas of disagreement are noted on the chart. Case was then discussed with Faculty Attending Supervisor for additional medical management. PERTINENT ATTENDING PHYSICIAN COMMENTS:    HISTORY:   Hugo Hernández is a 60 y.o. female who  has a past medical history of Anemia, Bleeding (10/2020), Cervical cancer ([facility] Depression, Diabetes mellitus ([facility] GERD (gastroesophageal reflux disease), [facility] (10/2020), [facility]varian cancer ([facility] Post chemo evaluation, and Splenic lesion. and presents with complaint of generalized fatigue, palpitations, worsening back pain. Patient has significant past history of  metastatic ovarian cancer. Is on active palliative chemotherapy. She states she feels extremely weak and has had worsening low back pain and thoracic back pain. Denies any trauma.     PHYSICAL:   Temp: 99.4 °F (37.4 °C),  Pulse: 98, Resp: 19, BP: 98/61, SpO2: 96 %  Gen: Non-toxic, Afebrile  Neck: Supple  Cards: Regular rate and rhythm  Pulm: Lung sounds clear to auscultation  Abdomen: Soft, non-tender, non-distended  Skin: warm, dry  Extremities: pulses 2+ radial / dorsalis pedis, no clubbing, cyanosis, edema    IMPRESSION:   Anemia versus intracranial involvement versus intraspinal involvement    PLAN:   Extensive imaging, basic labs, possible admission    CRITICAL CARE TIME:    None    Aline Soria DO  Senior Resident Physician    (Please note that portions of this note were completed with a voice recognition program.  Efforts were made to edit the dictations but occasionally words are mis-transcribed.)       Aline Soria DO Resident  03/09/21 2410

## 2021-03-10 NOTE — FLOWSHEET NOTE
Assessment: Patient was awake and oriented when  visited. Family was not present at the time. However, patient did confirm she had strong family support. When asked how she was feeling, patient responded; \"a little bit better. \" Patient said she [facility] but not affiliated with any Buddhism. Intervention:  provided presence, offered support and prayed with patient. Outcome: Patient expressed appreciation for the visit and for the prayer said with her. Follow up visits recommended for more spiritual and emotional support.       03/10/21 1437   Encounter Summary   Services provided to: Patient   Support System Family members   Place of [address] [place] [address] Visiting   (03/10/2021)   Complexity of Encounter Moderate   Length of Encounter 15 minutes   Spiritual Assessment Completed Yes   Routine   Type Initial   Assessment Calm; Approachable; Hopeful   Intervention Active listening;Prayer;[place];Empowerment   Outcome Expressed gratitude   Spiritual/Buddhism   Type Spiritual support

## 2021-03-10 NOTE — H&P
[facility]  Office: [address], DO, Florencia Venegas, DO, Antonia Cervantes, DO, Jeancarlos Braga, DO, Yvon López MD, Piter Coombs MD, Shawna Bustos MD, Luis Be MD, Yue Dorado MD, Marilu Garcia MD, Leia Pierce MD, Naeem Vazquez MD, Mbonu Mable Nyhan, MD, Mely Salas DO, Alida Hills MD, Angel Rubio DO, Lauren Archibald MD,  Landon Evans DO, Aravind March MD, Ev Waterman MD, Sonny Simental, [facility], [facility] LUANA Lamb, CNP, Jordan Figueroa, CNP, Ruth Terrazas, CNS, Maddie Mishra, CNP, Baron Miranda, CNP, Latricia Suarez, CNP, Lizbet Dobbs, CNP, Consuelo Aguirre, CNP, Harley Santizo PA-C, Sameera Tesfaye, SAILAJA, Shahab Tristan, CNP, Juliano Daigle, CNP, Em Vazquez, CNP, Serafin Naylor, CNP, Rodrigo Wilson, CNP, Julián Lopes, CNP         [facility]    HISTORY AND PHYSICAL EXAMINATION            Date:   3/9/2021  Patient name:  [facility]  Date of admission:  3/9/2021  4:56 PM  MRN:   [id number]  Account:  [de-identified]  YOB: 1965  PCP:    No primary care provider on file. Room:   17/17  Code Status:    Prior    Chief Complaint:     Chief Complaint   Patient presents with    Fatigue     c/o weakness with heart pounding, pt states was told at her last chemo Tx hgb was 7, pt also c/o shoulder and back pain     Back Pain       History Obtained From:     patient, electronic medical record    History of Present Illness:     [address] [place] [address] is a 60 y.o. Non-/non  female history of DM, GERD, and metastatic ovarian cancer who presents with Fatigue (c/o weakness with heart pounding, pt states was told at her last chemo Tx hgb was 7, pt also c/o shoulder and back pain ) and Back Pain   and is admitted to the hospital for the management of anemia      Patient presented to the emergency room for the concern of generalized body weakness and malaise.  Patient states that for the last 3-4 days she has been having the weakness with associated left shoulder pain with radiation to the back. She denies overt chest pain or shortness of breath, fever or chills. There is associated decreased appetite and nausea with out emesis but no acute abdominal pain, or changes in the stool characteristic or color. No acute urinary symptoms such as frequency, urgency or dysuria. It was voiced to the emergency room staff by the daughter that she has h ad her hgb drom secondary to the chemotherapy that she is undergoing and the last treatment was 2/26/21. She is also on eliquis for DVT noted last month. The work up in the emergency room showed the metabolic panel essential within normal limits without electrolyte disturbances or kidney injury. Her cardiac marker of Hs troponin was normal. Her LFT was normal. Her hemogram was without acute leukocytosis, however there was an anemia with a hgb of 7.2/ hct of 24.5, plt 761. Last [facility]  2/26/21 - 7.4/24.3, and 8.5/27.3 on 2/19,   Imaging studies show Ct Head W Wo Contrast with No acute intracranial abnormality. Ct Lumbar Spine Trauma Reconstruction Stable appearance CT lumbar spine when compared to prior study     Ct Thoracic Spine Trauma Reconstruction Unremarkable CT of the thoracic spine. Ct Cervical Spine Wo Contrast 1. No acute fracture. 2. Straightening of the normal cervical lordosis, which can be positional or related to muscle spasm. 3. Small lucent lesions within multiple cervical vertebral bodies are indeterminate. MRI with and without contrast may be useful further characterization. Xr Chest Portable Vague density retrocardiac space of uncertain etiology. No evidence of pneumothorax. Ct Chest Abdomen Pelvis W Contrast Interval increase in number of subcentimeter pulmonary nodules throughout the lungs bilaterally compatible with progressive metastatic disease. Resolving left lower lobe infiltrate and left pleural effusion.  Small right pleural effusion reanastamosis, subgastric omentectomy, intraperitoneal port placement    HYSTERECTOMY, TOTAL ABDOMINAL      IR EMBOLIZATION HEMORRHAGE  10/05/2020    intra-abdominal bleeding -due to splenic mass with GI infiltration. Status post embolization [employer] interlock coils x7. mri condtional 3t ok, safe immediately post implant.  IR PORT PLACEMENT EQUAL OR GREATER THAN 5 YEARS  8/24/2020    IR PORT PLACEMENT EQUAL OR GREATER THAN 5 YEARS 8/24/2020 Avinash Alfaro MD [facility]    PORT SURGERY      IP Port    TONSILLECTOMY          Medications Prior to Admission:     Prior to Admission medications    Medication Sig Start Date End Date Taking?  Authorizing Provider   oxyCODONE-acetaminophen (PERCOCET) 5-325 MG per tablet TAKE 1 TABLET BY MOUTH EVERY 4 HOURS AS NEEDED FOR PAIN (BREAKTHROUGH PAIN) FOR UP TO 30 DAYS. 3/8/21 4/7/21  Jose Bhatia MD   morphine (MS CONTIN) 15 MG extended release tablet TAKE 1 TABLET BY MOUTH 2 TIMES DAILY FOR 30 DAYS. 3/8/21 4/7/21  Jose Bhatia MD   morphine (MS CONTIN) 30 MG extended release tablet TAKE 1 TABLET BY MOUTH 2 TIMES DAILY FOR 30 DAYS. 3/8/21 4/7/21  Luca King MD   metFORMIN (GLUCOPHAGE-XR) 500 MG extended release tablet Take 2 tablets by mouth twice daily 2/26/21   Luac King MD   lactobacillus (CULTURELLE) capsule Take 1 capsule by mouth daily 2/17/21 3/19/21  Lauren Mcintyre MD   sertraline (ZOLOFT) 100 MG tablet Take 1 tablet by mouth daily 2/5/21 3/7/21  Luca King MD   lisinopril (PRINIVIL;ZESTRIL) 2.5 MG tablet Take 1 tablet by mouth daily 2/5/21   Luca King MD   metoprolol succinate (TOPROL XL) 25 MG extended release tablet Take 1 tablet by mouth daily 2/5/21   Luca King MD   levETIRAcetam (KEPPRA) 750 MG tablet Take 2 tablets by mouth 2 times daily 10/28/20   Garett Boogie MD   pantoprazole (PROTONIX) 40 MG tablet Take 40 mg by mouth 2 times daily    Historical Provider, MD Hearing normal.      Left Ear: Hearing normal.      Nose: Nose normal. No rhinorrhea. Eyes:      General: Lids are normal.      Extraocular Movements:      Right eye: Normal extraocular motion. Left eye: Normal extraocular motion. Conjunctiva/sclera: Conjunctivae normal.      Right eye: Right conjunctiva is not injected. Left eye: Left conjunctiva is not injected. Pupils: Pupils are equal, round, and reactive to light. Pupils are equal.      Right eye: Pupil is reactive. Left eye: Pupil is reactive. Neck:      Musculoskeletal: Neck supple. Thyroid: No thyromegaly. Vascular: No carotid bruit. Trachea: Trachea and phonation normal. No tracheal deviation. Cardiovascular:      Rate and Rhythm: Normal rate and regular rhythm. Pulses: Normal pulses. Heart sounds: Normal heart sounds. No murmur. Pulmonary:      Effort: Pulmonary effort is normal. No respiratory distress. Breath sounds: Normal breath sounds. No stridor. No decreased breath sounds. Abdominal:      General: Bowel sounds are normal. There is no distension. Palpations: Abdomen is soft. There is no mass. Tenderness: There is no abdominal tenderness. There is no guarding. Musculoskeletal:         General: No tenderness. Skin:     General: Skin is warm and dry. Findings: No erythema, lesion or rash. Neurological:      Mental Status: She is alert and oriented to person, place, and time. She is not disoriented. Cranial Nerves: No cranial nerve deficit. Psychiatric:         Speech: Speech normal.         Behavior: Behavior normal. Behavior is cooperative.          Investigations:      Laboratory Testing:  Recent Results (from the past 24 hour(s))   CBC Auto Differential    Collection Time: 03/09/21  5:43 PM   Result Value Ref Range    WBC 9.9 3.5 - 11.3 k/uL    RBC 3.19 (L) 3.95 - 5.11 m/uL    Hemoglobin 7.2 (L) 11.9 - 15.1 g/dL    Hematocrit 24.5 (L) 36.3 - 47.1 %    MCV 76.8 5:47 PM   Result Value Ref Range    Expiration Date 03/12/2021,2359     Arm Band Number [id number]     ABO/Rh A POSITIVE     Antibody Screen POSITIVE     Antibody ID       The positive antibody screening test has been evaluated for specific antibody identification with a diagnostic antigen panel. The patient's sample is found to contain an antibody with no apparent specificity. Clinically significant alloantibodies to common red cell antigens are ruled out. AMBER IgG NEGATIVE     Unit Number [id number]     Product Code Leukocyte Reduced Red Cell     Unit Divison 00     Dispense Status ISSUED     Transfusion Status OK TO TRANSFUSE     Crossmatch Result COMPATIBLE        Imaging/Diagnostics:  Ct Head W Wo Contrast    Result Date: 3/9/2021  No acute intracranial abnormality. Ct Cervical Spine Wo Contrast    Result Date: 3/9/2021  1. No acute fracture. 2. Straightening of the normal cervical lordosis, which can be positional or related to muscle spasm. 3. Small lucent lesions within multiple cervical vertebral bodies are indeterminate. MRI with and without contrast may be useful further characterization. Xr Chest Portable    Result Date: 3/9/2021  Vague density retrocardiac space of uncertain etiology. No evidence of pneumothorax. Ct Chest Abdomen Pelvis W Contrast    Result Date: 3/9/2021  Interval increase in number of subcentimeter pulmonary nodules throughout the lungs bilaterally compatible with progressive metastatic disease. Resolving left lower lobe infiltrate and left pleural effusion. Small right pleural effusion Stable moderate pericardial effusion. . Stable mediastinal, hilar, mesenteric, retroperitoneal, pelvic and left inguinal lymphadenopathy compatible with metastatic disease. Stable osteoblastic lesions in the spine.  Mildly improved left lower lobe infiltrate and left pleural effusion Stable pleural/extrapleural mass along the anterior right chest wall near the manubriosternal junction Protonix 40 mg daily  7. DVT prophylaxis   EPC cuffs   Maximize ambulation as no chemical anticoagulation secondary to the anemia   Hold eliquis at this time as patient has already received 1 unit RBC       Consultations:   [address] HOSPITALIST     Patient is admitted as inpatient status because of co-morbidities listed above, severity of signs and symptoms as outlined, requirement for current medical therapies and most importantly because of direct risk to patient if care not provided in a hospital setting. Expected length of stay > 48 hours. Manav Spivey, ESTRELLITA - [place]  3/9/2021  9:01 PM    Attending Supervising Physician's [address] [place] [address] Rd Statement  I performed a history and physical examination on the patient and discussed the management with the nurse practitioner. I reviewed and agree with the findings and plan as documented in her note .     Electronically signed by Kayley Torres MD on 3/10/21 at 4:12 PM EST

## 2021-03-10 NOTE — CONSULTS
CONSULT NOTE                    Today's Date: 3/10/2021  Patient Name: Jossie Servin  Date of admission: 3/9/2021  4:56 PM  Patient's age: 60 y.o., 1965  Admission Dx: Anemia [D64.9]         CHIEF COMPLAINT: Tiredness    History Obtained From:  patient, electronic medical record    HISTORY OF PRESENT ILLNESS:      The patient is a 60 y.o.  female who is admitted to the hospital for anemia. PMH metastatic ovarian CA, DM, GERD, anemia from chemotherapy presented for concerns of weakness and malaise for past 4 days. She additionally has L shoulder pain radiating to back, decreased appetitie and nausea without vomiting or abd pain. Hgb was found to be 7.2 although was 7 after last chemotherapy treatment on 2/26/2021. CT head showed no acute intracranial abnormality. CT spine showed small lucent lesions within multiple cervical vertebral bodies. CT chest abdomen shows increase in number of subcentimeter pulmonary nodules throughout lung bilaterally compativile with progressive metastatic disease, resolving LLL infiltrate/effusion, but stable pericardial effusion, stable lymphadenopathy, stable osteoblastic lesions. Oncology consulted for management of anemia. Patient has history of recurrent ovarian cancer originally diagnosed in the year 2005 with multiple recurrences. Patient has been treated with Doxil Avastin. Avastin was discontinued due to intra-abdominal bleeding. Patient also has history of seizure disorder. Work-up did not reveal any brain metastasis. Patient also recently had heart catheterization which did not show any critical stenosis. Patient was thought to be a candidate for anthracycline. Past Medical History:   has a past medical history of Anemia, Bleeding, Cervical cancer ([address] Depression, Diabetes mellitus ([facility] GERD (gastroesophageal reflux disease), Metastatic cancer ([facility] Ovarian cancer ([facility] Post chemo evaluation, and Splenic lesion.     Past  magnesium hydroxide (MILK OF MAGNESIA) 400 MG/5ML suspension 30 mL  30 mL Oral Daily PRN Cinderella Hives, APRN - CNP        potassium chloride (KLOR-CON M) extended release tablet 40 mEq  40 mEq Oral PRN Cinderella Hives, APRN - CNP        Or    potassium bicarb-citric acid (EFFER-K) effervescent tablet 40 mEq  40 mEq Oral PRN Cinderella Hives, APRN - CNP        Or    potassium chloride 10 mEq/100 mL IVPB (Peripheral Line)  10 mEq Intravenous PRN Cinderella Hives, APRN - CNP        potassium chloride 10 mEq/100 mL IVPB (Peripheral Line)  10 mEq Intravenous PRN Cinderella Hives, APRN - CNP        magnesium sulfate 1000 mg in dextrose 5% 100 mL IVPB  1,000 mg Intravenous PRN Cinderella Hives, APRN - CNP        atorvastatin (LIPITOR) tablet 40 mg  40 mg Oral Nightly Cinderella Hives, APRN - CNP   40 mg at 03/09/21 2258    nitroGLYCERIN (NITROSTAT) SL tablet 0.4 mg  0.4 mg Sublingual Q5 Min PRN Cinderella Hives, APRN - CNP        0.9 % sodium chloride infusion   Intravenous Continuous Cinderella Hives, APRN -  mL/hr at 03/10/21 0945 100 mL/hr at 03/10/21 0945    insulin lispro (HUMALOG) injection vial 0-6 Units  0-6 Units Subcutaneous TID WC Cinderella Hives, APRN - CNP        insulin lispro (HUMALOG) injection vial 0-3 Units  0-3 Units Subcutaneous Nightly Cinderella Hives, APRN - CNP        morphine (MS CONTIN) extended release tablet 15 mg  15 mg Oral BID Cinderella Hives, APRN - CNP   15 mg at 03/10/21 0850    morphine (MS CONTIN) extended release tablet 30 mg  30 mg Oral BID Cinderella Hives, APRN - CNP   30 mg at 03/10/21 0849    oxyCODONE-acetaminophen (PERCOCET) 5-325 MG per tablet 1 tablet  1 tablet Oral Q4H PRN Cinderella Hives, APRN - CNP   1 tablet at 03/10/21 1566       Allergies:  Ceftriaxone    Social History:   reports that she has been smoking cigarettes.  She has been smoking about 1.00 pack per day. She uses smokeless tobacco. She reports previous alcohol use. She reports that she does not use drugs. Family History: family history includes Alcohol Abuse in her mother; Cirrhosis in her mother. REVIEW OF SYSTEMS:      Constitutional: No fever or chills. No night sweats, no weight loss. Positive for fatigue  Eyes: No eye discharge, double vision, or eye pain   HEENT: negative for sore mouth, sore throat, hoarseness and voice change   Respiratory: negative for cough , sputum, dyspnea, wheezing, hemoptysis, chest pain   Cardiovascular: negative for chest pain, dyspnea, palpitations, orthopnea, PND   Gastrointestinal: negative for nausea, vomiting, diarrhea, constipation, or  abdominal pain   Genitourinary: negative for frequency, dysuria, nocturia, urinary incontinence, and hematuria   Hematologic/Lymphatic: negative for easy bruising, bleeding, petechiae or lymphadenopathy  Endocrine: negative for heat or cold intolerance,   Musculoskeletal: negative for myalgias, arthralgias, pain, joint swelling,and bone pain   Neurological: negative for headaches, dizziness, seizures, weakness, or numbness  Integument: negative for rash, skin lesions, bruises.     PHYSICAL EXAM:        Vitals:  BP (!) 101/50   Pulse 80   Temp 97.9 °F (36.6 °C) (Oral)   Resp 18   Ht 5' 5\" (1.651 m)   Wt 158 lb 8 oz (71.9 kg)   SpO2 94%   BMI 26.38 kg/m²     General appearance - well appearing, not in pain or distress, not jaundiced   Mental status - alert and cooperative   Eyes - pupils equal and reactive, extraocular eye movements intact   Ears - bilateral TM's and external ear canals normal   Mouth - mucous membranes moist, pharynx normal without lesions,   Neck - supple, no palpable  adenopathy , trach midline   Lymphatics - no palpable lymphadenopathy, no hepatosplenomegaly   Chest - clear to auscultation, no wheezes, rales or rhonchi, symmetric air entry , normal chest expansion  Heart - normal rate, regular rhythm, normal S1, S2, no murmurs,  Abdomen - soft, nontender, nondistended, no masses or organomegaly   Neurological - alert, oriented, normal speech, no focal findings or movement disorder noted   Musculoskeletal - no joint tenderness, deformity or swelling   Extremities - peripheral pulses normal, no pedal edema, no clubbing or cyanosis   Skin - normal coloration and turgor, no rashes, no suspicious skin lesions noted ,   Port: site of port not red, no tenderness    DATA:      Labs:   [unfilled]    CBC:   Recent Labs     03/09/21  1743 03/09/21  1743 03/10/21  0440 03/10/21  0809   WBC 9.9  --  7.8  --    HGB 7.2*   < > 7.5* 7.1*   HCT 24.5*   < > 25.7* 24.7*   *  --  769*  --     < > = values in this interval not displayed. BMP:   Recent Labs     03/09/21  1743 03/10/21  0440    139   K 4.1 4.2   CO2 22 23   BUN 11 8   CREATININE 0.47* 0.40*   LABGLOM >60 >60   GLUCOSE 108* 75     PT/INR: No results for input(s): PROTIME, INR in the last 72 hours. APTT:No results for input(s): APTT in the last 72 hours.   LIVER PROFILE:  Recent Labs     03/09/21  1743 03/10/21  0440   AST 22 13   ALT 9 7   LABALBU 4.0 3.3*           IMPRESSION:      Patient Active Problem List   Diagnosis    Malignant neoplasm of ovary ([address]    Seizure ([address]    Type 2 diabetes mellitus without complication, without long-term current use of insulin (HCC)    Anemia    Splenic abscess    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 ([address]    Acute encephalopathy    PRES (posterior reversible encephalopathy syndrome)    Hemianopia, bitemporal    Encephalopathy    Status epilepticus ([address]    History of malignant neoplasm of ovary    Pleural effusion    Bandemia    Cancer, metastatic to bone ([address]    Essential hypertension    Intractable low back pain     Active Hospital Problems    Diagnosis Date Noted    Cancer, metastatic to bone ([address] [C79.51] 01/28/2021    Essential hypertension [I10] 01/28/2021    Anemia [D64.9]     Type 2 diabetes mellitus without complication, without long-term current use of insulin [facility] [E11.9]     Seizure ([facility] [R56.9] 10/21/2020    Malignant neoplasm of ovary ([facility] [C56.9] 08/17/2020     Interval increase in number of subcentimeter pulmonary nodules throughout the   lungs bilaterally compatible with progressive metastatic disease. Resolving   left lower lobe infiltrate and left pleural effusion. Small right pleural   effusion       Stable moderate pericardial effusion. .       Stable mediastinal, hilar, mesenteric, retroperitoneal, pelvic and left   inguinal lymphadenopathy compatible with metastatic disease.       Stable osteoblastic lesions in the spine.       Mildly improved left lower lobe infiltrate and left pleural effusion       Stable pleural/extrapleural mass along the anterior right chest wall near the   manubriosternal junction       Mild ascites       Evidence of constipation       Stable pigtail catheter in the complex splenic mass.       No other significant changes are seen compared to the previous exams       IMPRESSION:    1. Malignant ovarian cancer with metastasis to bone:  recurrent ovarian cancer originally diagnosed in the year 2005 with multiple recurrences. Patient has been treated with Doxil and Avastin. Avastin was discontinued due to intra-abdominal bleeding. Currently on gemcitabine treatment which has fewer side effects but does result in anemia  2. Metastatic disease to bone and lung: New lesions noted in lung, however no effect on breathing, other lesions appear stable at this time  3. Anemia: From gemcitabine, stable, no need for additional treatment at this time  4. DVT: On left, placed on Eliquis outpatient, unlikely cause of anemia at this time        RECOMMENDATIONS:  1.  Given stability of hemoglobin, no active bleed, symptoms minimal and mostly resolved, unlikely problem at this time, anemia is chronic and likely result of gemcitabine, will continue treatment as outpatient  2. From our standpoint okay to continue anticoagulation at this time      Discussed with patient and Nurse. Mirna Mccoy MD  PGY-3, Internal Medicine Resident  [address], [place], [place]      Attending Physician Statement   I have discussed the care of [address] [place] [address], including pertinent history and exam findings with the resident. I have reviewed the key elements of all parts of the encounter with the resident. I have seen and examined the patient with the resident. I agree with the assessment and plan and status of the problem list as documented. Records and labs and images were reviewed and discussed with the patient. Patient is known to me for management of recurrent ovarian cancer. Currently she is on systemic chemotherapy with gemcitabine. Anemia is related to chemotherapy-induced anemia with no evidence of active bleeding. Okay for transfusion to keep hemoglobin above 7. We will resume treatment upon discharge. She is scheduled for chemotherapy on Friday.                             [address] [facility]

## 2021-03-10 NOTE — PLAN OF CARE
Problem: Activity:  Goal: Fatigue will decrease  Description: Fatigue will decrease  Outcome: Ongoing  Goal: Ability to tolerate increased activity will improve  Description: Ability to tolerate increased activity will improve  Outcome: Ongoing  Goal: Ability to maintain optimal joint mobility will improve  Description: Ability to maintain optimal joint mobility will improve  Outcome: Ongoing     Problem:  Bowel/Gastric:  Goal: Ability to achieve a regular elimination pattern will improve  Description: Ability to achieve a regular elimination pattern will improve  Outcome: Ongoing     Problem: Cardiac:  Goal: Ability to maintain an adequate cardiac output will improve  Description: Ability to maintain an adequate cardiac output will improve  Outcome: Ongoing  Goal: Ability to maintain adequate ventilation will improve  Description: Ability to maintain adequate ventilation will improve  Outcome: Ongoing  Goal: Ability to achieve and maintain adequate cardiopulmonary perfusion will improve  Description: Ability to achieve and maintain adequate cardiopulmonary perfusion will improve  Outcome: Ongoing

## 2021-03-10 NOTE — PLAN OF CARE
Problem: Activity:  Goal: Fatigue will decrease  Description: Fatigue will decrease  3/10/2021 1523 by Jeanne Favre, RN  Outcome: Ongoing  3/10/2021 0627 by Juana Taveras RN  Outcome: Ongoing  Goal: Ability to tolerate increased activity will improve  Description: Ability to tolerate increased activity will improve  3/10/2021 1523 by Jeanne Favre, RN  Outcome: Ongoing  3/10/2021 0627 by Juana Taveras RN  Outcome: Ongoing  Goal: Ability to maintain optimal joint mobility will improve  Description: Ability to maintain optimal joint mobility will improve  3/10/2021 1523 by Jeanne Favre, RN  Outcome: Ongoing  3/10/2021 0627 by Juana Taveras RN  Outcome: Ongoing     Problem:  Bowel/Gastric:  Goal: Ability to achieve a regular elimination pattern will improve  Description: Ability to achieve a regular elimination pattern will improve  3/10/2021 1523 by Jeanne Favre, RN  Outcome: Ongoing  3/10/2021 0627 by Juana Taveras RN  Outcome: Ongoing     Problem: Cardiac:  Goal: Ability to maintain an adequate cardiac output will improve  Description: Ability to maintain an adequate cardiac output will improve  3/10/2021 1523 by Jeanne Favre, RN  Outcome: Ongoing  3/10/2021 0627 by Juana Taveras RN  Outcome: Ongoing  Goal: Ability to maintain adequate ventilation will improve  Description: Ability to maintain adequate ventilation will improve  3/10/2021 1523 by Jeanne Favre, RN  Outcome: Ongoing  3/10/2021 0627 by Juana Taveras RN  Outcome: Ongoing  Goal: Ability to achieve and maintain adequate cardiopulmonary perfusion will improve  Description: Ability to achieve and maintain adequate cardiopulmonary perfusion will improve  3/10/2021 1523 by Jeanne Favre, RN  Outcome: Ongoing  3/10/2021 0627 by Juana Taveras RN  Outcome: Ongoing     Problem: Coping:  Goal: Ability to adjust to condition or change in health will improve  Description: Ability to adjust to condition or change in health will improve  Outcome: Ongoing Goal: Communication of feelings regarding changes in body function or appearance will improve  Description: Communication of feelings regarding changes in body function or appearance will improve  Outcome: Ongoing     Problem: Health Behavior:  Goal: Identification of resources available to assist in meeting health care needs will improve  Description: Identification of resources available to assist in meeting health care needs will improve  Outcome: Ongoing     Problem: Nutritional:  Goal: Maintenance of adequate nutrition will improve  Description: Maintenance of adequate nutrition will improve  Outcome: Ongoing     Problem: Physical Regulation:  Goal: Signs and symptoms of infection will decrease  Description: Signs and symptoms of infection will decrease  Outcome: Ongoing  Goal: Will show no signs and symptoms of excessive bleeding  Description: Will show no signs and symptoms of excessive bleeding  Outcome: Ongoing  Goal: Complications related to the disease process, condition or treatment will be avoided or minimized  Description: Complications related to the disease process, condition or treatment will be avoided or minimized  Outcome: Ongoing     Problem: Safety:  Goal: Ability to remain free from injury will improve  Description: Ability to remain free from injury will improve  Outcome: Ongoing     Problem: Sensory:  Goal: Pain level will decrease  Description: Pain level will decrease  Outcome: Ongoing  Goal: General experience of comfort will improve  Description: General experience of comfort will improve  Outcome: Ongoing     Problem: Skin Integrity:  Goal: Skin integrity will improve  Description: Skin integrity will improve  Outcome: Ongoing  Goal: Signs of wound healing will improve  Description: Signs of wound healing will improve  Outcome: Ongoing     Problem: Tissue Perfusion:  Goal: Ability to maintain adequate tissue perfusion will improve  Description: Ability to maintain adequate tissue perfusion will improve  Outcome: Ongoing  Goal: Ability to maintain a stable neurologic state will improve  Description: Ability to maintain a stable neurologic state will improve  Outcome: Ongoing     Problem: Pain:  Goal: Pain level will decrease  Description: Pain level will decrease  Outcome: Ongoing  Goal: Control of acute pain  Description: Control of acute pain  Outcome: Ongoing  Goal: Control of chronic pain  Description: Control of chronic pain  Outcome: Ongoing

## 2021-03-10 NOTE — CONSULTS
Vascular Surgery  New Consult      Physician Requesting Consult:  Dr. Asiya Sterling    Reason for Consult:   Possible IVC filter placement    CC:   Anemia     HPI:     Marianna Montes De Oca is a 60 y.o. woman who was admitted to [address] [place] [address] for anemia requiring blood transfusion. She states that she came to the emergency department after feeling \"run down\" for several days. Patient has a past medical history of metastatic ovarian cancer for which she is undergoing chemo treatments, DM, GERD, and anemia. She underwent coil embolization of the splenic artery in October 2020 at [place] [address] by Dr. Satya Moe. Patient is known to have a DVT which was found outpatient in January 2021 and has been on home Eliquis. She states she has been compliant with Eliquis at home but has not received it since being in the hospital. She has no complaints of leg pain or discomfort today. Usually ambulates at home without assistance. States she is overall feeling ok today other than still feeling \"run down\". Past Medical History:   Diagnosis Date    Anemia     Bleeding 10/2020    intra-abdominal bleeding -due to splenic mass with GI infiltration. Status post embolization    Cervical cancer ([address]     Depression     Diabetes mellitus ([address]     GERD (gastroesophageal reflux disease)     Metastatic cancer ([facility] 10/2020    extensive intraabdominal and splenic involvement and lung mets.     Ovarian cancer ([address]     low grade serous ovarian carcinoma    Post chemo evaluation     2007: Chemo via med onc (Dr. Shira Irving), 2008: [facility] [place] due to rising CA-125, 2013: intraperitoneal chemo,12/2015: Ca125 - 25     Splenic lesion        Past Surgical History:   Procedure Laterality Date    ABSCESS DRAINAGE  2013    Franca rectal    ANUS SURGERY      ANAL FISSURECTOMY    COLECTOMY  03/2013    ex lap, tumor debulking, transverse colectomy w reanastamosis, subgastric omentectomy, intraperitoneal port placement    HYSTERECTOMY, TOTAL ABDOMINAL      IR EMBOLIZATION HEMORRHAGE  10/05/2020    intra-abdominal bleeding -due to splenic mass with GI infiltration. Status post embolization [employer] interlock coils x7. mri condtional 3t ok, safe immediately post implant.     IR PORT PLACEMENT EQUAL OR GREATER THAN 5 YEARS  8/24/2020    IR PORT PLACEMENT EQUAL OR GREATER THAN 5 YEARS 8/24/2020 Rj Catherine MD [facility]    PORT SURGERY      IP Port    TONSILLECTOMY         Family History   Problem Relation Age of Onset    Alcohol Abuse Mother     Cirrhosis Mother      Allergies:    Ceftriaxone     Current Facility-Administered Medications   Medication Dose Route Frequency Provider Last Rate Last Admin    0.9 % sodium chloride infusion   Intravenous PRN Assumpta Ritesh Kingsley MD        aspirin chewable tablet 81 mg  81 mg Oral Nightly ESTRELLITA Deleon - CNP   81 mg at 03/09/21 2258    lactobacillus (CULTURELLE) capsule 1 capsule  1 capsule Oral Daily ESTRELLITA Deleon - CNP   1 capsule at 03/10/21 0849    levETIRAcetam (KEPPRA) tablet 1,500 mg  1,500 mg Oral BID Damon Gonsalez APRN - CNP   1,500 mg at 03/10/21 0848    lisinopril (PRINIVIL;ZESTRIL) tablet 2.5 mg  2.5 mg Oral Daily Damon Gonsalez APRN - CNP   Stopped at 03/10/21 1013    metoprolol succinate (TOPROL XL) extended release tablet 25 mg  25 mg Oral Daily Damon Gonsalez APRN - CNP   Stopped at 03/10/21 0851    pantoprazole (PROTONIX) tablet 40 mg  40 mg Oral BID Damon Gonsalez APRN - CNP   40 mg at 03/10/21 0849    sertraline (ZOLOFT) tablet 100 mg  100 mg Oral Daily Damon Gonsalez APRN - CNP   100 mg at 03/10/21 0849    glucose (GLUTOSE) 40 % oral gel 15 g  15 g Oral PRN Damon Gonsalez APRN - CNP        dextrose 50 % IV solution  12.5 g Intravenous PRN Damon Gonsalez APRN - CNP        glucagon (rDNA) injection 1 mg  1 mg Intramuscular PRN Damon Gonsalez APRN - CNP lispro (HUMALOG) injection vial 0-6 Units  0-6 Units Subcutaneous TID WC Obdulia Leag, APRN - CNP        insulin lispro (HUMALOG) injection vial 0-3 Units  0-3 Units Subcutaneous Nightly Obdulia Mayes, APRN - CNP        morphine (MS CONTIN) extended release tablet 15 mg  15 mg Oral BID Obdulia Venug, APRN - CNP   15 mg at 03/10/21 0850    morphine (MS CONTIN) extended release tablet 30 mg  30 mg Oral BID Obdulia Venug, APRN - CNP   30 mg at 03/10/21 0849    oxyCODONE-acetaminophen (PERCOCET) 5-325 MG per tablet 1 tablet  1 tablet Oral Q4H PRN Obdulia Venug, APRN - CNP   1 tablet at 03/10/21 1201     Social Hx:    Tobacco:    reports that she has been smoking cigarettes. She has been smoking about 1.00 pack per day. She uses smokeless tobacco.  Alcohol:      reports previous alcohol use. Drug Use:  reports no history of drug use. ROS:  Review of Systems   Constitutional: Negative for chills and fever. Respiratory: Negative for chest tightness and shortness of breath. Cardiovascular: Negative for chest pain and leg swelling. Gastrointestinal: Negative for abdominal pain and nausea. Musculoskeletal: Negative for myalgias. Skin: Negative for rash. Neurological: Negative for dizziness, weakness and numbness. Physical Exam:  Vitals:  BP (!) 98/55   Pulse 89   Temp 98.1 °F (36.7 °C) (Oral)   Resp 16   Ht 5' 5\" (1.651 m)   Wt 158 lb 8 oz (71.9 kg)   SpO2 95%   BMI 26.38 kg/m²     Physical Exam  HENT:      Head: Normocephalic and atraumatic. Nose: Nose normal.   Eyes:      Extraocular Movements: Extraocular movements intact. Neck:      Musculoskeletal: Normal range of motion. Cardiovascular:      Rate and Rhythm: Normal rate. Pulses: Normal pulses. Comments: DP/PT pulses 2+   Pulmonary:      Effort: Pulmonary effort is normal. No respiratory distress. Abdominal:      General: Abdomen is flat.    Musculoskeletal:         General: No swelling. Right lower leg: No edema. Left lower leg: No edema. Comments: No pain with calf squeeze bilaterally. Skin:     General: Skin is warm. Capillary Refill: Capillary refill takes less than 2 seconds. Neurological:      General: No focal deficit present. Mental Status: She is alert and oriented to person, place, and time. Psychiatric:         Mood and Affect: Mood normal.       Imaging:    Ct Head W Wo Contrast    Result Date: 3/9/2021  No acute intracranial abnormality. Ct Cervical Spine Wo Contrast    Result Date: 3/9/2021  1. No acute fracture. 2. Straightening of the normal cervical lordosis, which can be positional or related to muscle spasm. 3. Small lucent lesions within multiple cervical vertebral bodies are indeterminate. MRI with and without contrast may be useful further characterization. Xr Chest Portable    Result Date: 3/9/2021  Vague density retrocardiac space of uncertain etiology. No evidence of pneumothorax. Ct Chest Abdomen Pelvis W Contrast    Result Date: 3/9/2021  Interval increase in number of subcentimeter pulmonary nodules throughout the lungs bilaterally compatible with progressive metastatic disease. Resolving left lower lobe infiltrate and left pleural effusion. Small right pleural effusion Stable moderate pericardial effusion. . Stable mediastinal, hilar, mesenteric, retroperitoneal, pelvic and left inguinal lymphadenopathy compatible with metastatic disease. Stable osteoblastic lesions in the spine. Mildly improved left lower lobe infiltrate and left pleural effusion Stable pleural/extrapleural mass along the anterior right chest wall near the manubriosternal junction Mild ascites Evidence of constipation Stable pigtail catheter in the complex splenic mass.  No other significant changes are seen compared to the previous exams     Ct Lumbar Spine Trauma Reconstruction    Result Date: 3/9/2021  Stable appearance CT lumbar spine when compared to prior study     Ct Thoracic Spine Trauma Reconstruction    Result Date: 3/9/2021  Unremarkable CT of the thoracic spine. Assessment and Plan:    Left lower extremity DVT   Vascular surgery consulted for possible placement of IVC filter   Will review doppler ultrasound   Patient unable to be on chemical anticoagulation secondary to anemia requiring blood transfusions     Electronically signed by Sanjana Quinteros DO on 3/10/21 at 3:18 PM EST    General Surgery Resident Statement/Note:  I have discussed the case, including pertinent history and exam findings with the above resident and have personally seen the patient.      Pt seen and examined at bedside. I performed both history and physical exam. Patient with poor prognosis and metastatic ovarian ca and anemia requiring transfusions. She has reported hx of LE DVT on eliquis which has been held. She is s/p splenic artery embolization. At this time, patient has very poor prognosis and is high risk for surgical intervention. Would not recommend IVC filter placement. OK to hold [facility] as she is requiring transfusions. Will follow up duplex imaging. Discussed with Dr Christina Ford.     Eulalia Moreno MD  General Surgery PGY3

## 2021-03-10 NOTE — ED PROVIDER NOTES
[facility] [address] [facility]  Emergency Department  Emergency Medicine Resident Sign-out     Care of Nikki Bryant was assumed from Dr. Maria Del Carmen Alan and is being seen for Fatigue (c/o weakness with heart pounding, pt states was told at her last chemo Tx hgb was 7, pt also c/o shoulder and back pain ) and Back Pain   . The patient's initial evaluation and plan have been discussed with the prior provider who initially evaluated the patient. EMERGENCY DEPARTMENT COURSE / MEDICAL DECISION MAKING:       MEDICATIONS GIVEN:  Orders Placed This Encounter   Medications    DISCONTD: morphine injection 4 mg    fentaNYL (SUBLIMAZE) injection 50 mcg    ondansetron (ZOFRAN) injection 4 mg    0.9 % sodium chloride bolus    diphenhydrAMINE (BENADRYL) injection 12.5 mg    diphenhydrAMINE (BENADRYL) 50 MG/ML injection     OSCAR TONEY: cabinet override    fentaNYL (SUBLIMAZE) injection 50 mcg    iopamidol (ISOVUE-370) 76 % injection 100 mL    0.9 % sodium chloride infusion       LABS / RADIOLOGY:     Labs Reviewed   CBC WITH AUTO DIFFERENTIAL - Abnormal; Notable for the following components:       Result Value    RBC 3.19 (*)     Hemoglobin 7.2 (*)     Hematocrit 24.5 (*)     MCV 76.8 (*)     MCH 22.6 (*)     RDW 21.1 (*)     Platelets 391 (*)     NRBC Automated 0.8 (*)     Immature Granulocytes 1 (*)     Seg Neutrophils 69 (*)     Lymphocytes 14 (*)     Monocytes 15 (*)     nRBC 1 (*)     Absolute Mono # 1.49 (*)     All other components within normal limits   COMPREHENSIVE METABOLIC PANEL W/ REFLEX TO MG FOR LOW K - Abnormal; Notable for the following components:    Glucose 108 (*)     CREATININE 0.47 (*)     Chloride 96 (*)     Total Bilirubin <0.10 (*)     All other components within normal limits   CULTURE, URINE   TROPONIN   URINALYSIS WITH MICROSCOPIC   TYPE AND SCREEN   PREPARE RBC (CROSSMATCH)       CT THORACIC SPINE TRAUMA RECONSTRUCTION   Final Result   Unremarkable CT of the thoracic spine.          CT LUMBAR SPINE TRAUMA RECONSTRUCTION   Final Result   Stable appearance CT lumbar spine when compared to prior study         CT CHEST ABDOMEN PELVIS W CONTRAST   Final Result   Interval increase in number of subcentimeter pulmonary nodules throughout the   lungs bilaterally compatible with progressive metastatic disease. Resolving   left lower lobe infiltrate and left pleural effusion. Small right pleural   effusion      Stable moderate pericardial effusion. .      Stable mediastinal, hilar, mesenteric, retroperitoneal, pelvic and left   inguinal lymphadenopathy compatible with metastatic disease. Stable osteoblastic lesions in the spine. Mildly improved left lower lobe infiltrate and left pleural effusion      Stable pleural/extrapleural mass along the anterior right chest wall near the   manubriosternal junction      Mild ascites      Evidence of constipation      Stable pigtail catheter in the complex splenic mass. No other significant changes are seen compared to the previous exams         CT CERVICAL SPINE WO CONTRAST   Final Result   1. No acute fracture. 2. Straightening of the normal cervical lordosis, which can be positional or   related to muscle spasm. 3. Small lucent lesions within multiple cervical vertebral bodies are   indeterminate. MRI with and without contrast may be useful further   characterization. CT HEAD W WO CONTRAST   Final Result   No acute intracranial abnormality. XR CHEST PORTABLE   Final Result   Vague density retrocardiac space of uncertain etiology. No evidence of   pneumothorax. RECENT VITALS:     Temp: 99.5 °F (37.5 °C),  Pulse: 95, Resp: 20, BP: (!) 116/59, SpO2: 94 %    This patient is a 60 y.o. Female with history of ovarian cancer with metastases to the lumbar and thoracic spine, lungs who presents with concerns for fatigue, anginal chest pain.   Patient decided metastases to the cervical spine with an MRI pending at this time, was found to be anemic at 7.2, currently being transfused. Patient is being transfusion at this time, plan is to be admitted with concerns for symptomatic anemia. OUTSTANDING TASKS / RECOMMENDATIONS:      1. Await Bed placement  2. FINAL IMPRESSION:     1. Fatigue, unspecified type    2. Malignant neoplasm of ovary, unspecified laterality ([facility]    3. Anemia, unspecified type        DISPOSITION:       DISPOSITION:  []  Discharge   []  Transfer -    [x]  Admission - intermed    []  Against Medical Advice   []  Eloped   FOLLOW-UP: No follow-up provider specified.    DISCHARGE MEDICATIONS: New Prescriptions    No medications on file          Kinsey Jung MD  Emergency Medicine Resident  Surendra Webber MD  Resident  03/10/21 8643

## 2021-03-11 PROBLEM — I82.412 ACUTE DEEP VEIN THROMBOSIS (DVT) OF FEMORAL VEIN OF LEFT LOWER EXTREMITY (HCC): Status: ACTIVE | Noted: 2021-03-11

## 2021-03-11 LAB
CULTURE: NORMAL
GLUCOSE BLD-MCNC: 128 MG/DL (ref 65–105)
GLUCOSE BLD-MCNC: 148 MG/DL (ref 65–105)
GLUCOSE BLD-MCNC: 98 MG/DL (ref 65–105)
HCT VFR BLD CALC: 23.8 % (ref 36.3–47.1)
HCT VFR BLD CALC: 25.8 % (ref 36.3–47.1)
HCT VFR BLD CALC: 29.2 % (ref 36.3–47.1)
HCT VFR BLD CALC: 29.6 % (ref 36.3–47.1)
HEMOGLOBIN: 6.8 G/DL (ref 11.9–15.1)
HEMOGLOBIN: 8.1 G/DL (ref 11.9–15.1)
HEMOGLOBIN: 8.7 G/DL (ref 11.9–15.1)
HEMOGLOBIN: 8.9 G/DL (ref 11.9–15.1)
Lab: NORMAL
SPECIMEN DESCRIPTION: NORMAL

## 2021-03-11 PROCEDURE — 85014 HEMATOCRIT: CPT

## 2021-03-11 PROCEDURE — P9016 RBC LEUKOCYTES REDUCED: HCPCS

## 2021-03-11 PROCEDURE — 76937 US GUIDE VASCULAR ACCESS: CPT

## 2021-03-11 PROCEDURE — 99232 SBSQ HOSP IP/OBS MODERATE 35: CPT | Performed by: INTERNAL MEDICINE

## 2021-03-11 PROCEDURE — 1200000000 HC SEMI PRIVATE

## 2021-03-11 PROCEDURE — 82947 ASSAY GLUCOSE BLOOD QUANT: CPT

## 2021-03-11 PROCEDURE — 6370000000 HC RX 637 (ALT 250 FOR IP): Performed by: NURSE PRACTITIONER

## 2021-03-11 PROCEDURE — 36415 COLL VENOUS BLD VENIPUNCTURE: CPT

## 2021-03-11 PROCEDURE — 85018 HEMOGLOBIN: CPT

## 2021-03-11 PROCEDURE — 86900 BLOOD TYPING SEROLOGIC ABO: CPT

## 2021-03-11 PROCEDURE — APPSS60 APP SPLIT SHARED TIME 46-60 MINUTES: Performed by: NURSE PRACTITIONER

## 2021-03-11 PROCEDURE — 99232 SBSQ HOSP IP/OBS MODERATE 35: CPT | Performed by: STUDENT IN AN ORGANIZED HEALTH CARE EDUCATION/TRAINING PROGRAM

## 2021-03-11 RX ORDER — SODIUM CHLORIDE 9 MG/ML
INJECTION, SOLUTION INTRAVENOUS PRN
Status: DISCONTINUED | OUTPATIENT
Start: 2021-03-11 | End: 2021-03-12 | Stop reason: HOSPADM

## 2021-03-11 RX ORDER — SENNA PLUS 8.6 MG/1
1 TABLET ORAL NIGHTLY
Status: DISCONTINUED | OUTPATIENT
Start: 2021-03-11 | End: 2021-03-12 | Stop reason: HOSPADM

## 2021-03-11 RX ADMIN — STANDARDIZED SENNA CONCENTRATE 8.6 MG: 8.6 TABLET ORAL at 21:03

## 2021-03-11 RX ADMIN — PANTOPRAZOLE SODIUM 40 MG: 40 TABLET, DELAYED RELEASE ORAL at 09:18

## 2021-03-11 RX ADMIN — Medication 1 CAPSULE: at 09:18

## 2021-03-11 RX ADMIN — SERTRALINE 100 MG: 50 TABLET, FILM COATED ORAL at 09:18

## 2021-03-11 RX ADMIN — INSULIN LISPRO 1 UNITS: 100 INJECTION, SOLUTION INTRAVENOUS; SUBCUTANEOUS at 21:04

## 2021-03-11 RX ADMIN — MORPHINE SULFATE 15 MG: 30 TABLET, EXTENDED RELEASE ORAL at 21:05

## 2021-03-11 RX ADMIN — MORPHINE SULFATE 30 MG: 30 TABLET, EXTENDED RELEASE ORAL at 09:19

## 2021-03-11 RX ADMIN — OXYCODONE HYDROCHLORIDE AND ACETAMINOPHEN 1 TABLET: 5; 325 TABLET ORAL at 19:57

## 2021-03-11 RX ADMIN — OXYCODONE HYDROCHLORIDE AND ACETAMINOPHEN 1 TABLET: 5; 325 TABLET ORAL at 07:22

## 2021-03-11 RX ADMIN — PANTOPRAZOLE SODIUM 40 MG: 40 TABLET, DELAYED RELEASE ORAL at 21:04

## 2021-03-11 RX ADMIN — OXYCODONE HYDROCHLORIDE AND ACETAMINOPHEN 1 TABLET: 5; 325 TABLET ORAL at 15:52

## 2021-03-11 RX ADMIN — OXYCODONE HYDROCHLORIDE AND ACETAMINOPHEN 1 TABLET: 5; 325 TABLET ORAL at 11:23

## 2021-03-11 RX ADMIN — ATORVASTATIN CALCIUM 40 MG: 80 TABLET, FILM COATED ORAL at 21:04

## 2021-03-11 RX ADMIN — ASPIRIN 81 MG: 81 TABLET, CHEWABLE ORAL at 21:04

## 2021-03-11 RX ADMIN — METOPROLOL SUCCINATE 25 MG: 25 TABLET, FILM COATED, EXTENDED RELEASE ORAL at 09:18

## 2021-03-11 RX ADMIN — LEVETIRACETAM 1500 MG: 500 TABLET, FILM COATED ORAL at 21:04

## 2021-03-11 RX ADMIN — OXYCODONE HYDROCHLORIDE AND ACETAMINOPHEN 1 TABLET: 5; 325 TABLET ORAL at 03:31

## 2021-03-11 RX ADMIN — MORPHINE SULFATE 30 MG: 30 TABLET, EXTENDED RELEASE ORAL at 21:09

## 2021-03-11 RX ADMIN — MORPHINE SULFATE 15 MG: 30 TABLET, EXTENDED RELEASE ORAL at 09:18

## 2021-03-11 RX ADMIN — LEVETIRACETAM 1500 MG: 500 TABLET, FILM COATED ORAL at 09:18

## 2021-03-11 RX ADMIN — INSULIN LISPRO 1 UNITS: 100 INJECTION, SOLUTION INTRAVENOUS; SUBCUTANEOUS at 17:48

## 2021-03-11 RX ADMIN — APIXABAN 5 MG: 5 TABLET, FILM COATED ORAL at 14:53

## 2021-03-11 ASSESSMENT — PAIN DESCRIPTION - PROGRESSION
CLINICAL_PROGRESSION: NOT CHANGED

## 2021-03-11 ASSESSMENT — PAIN SCALES - GENERAL
PAINLEVEL_OUTOF10: 8
PAINLEVEL_OUTOF10: 7

## 2021-03-11 NOTE — PROGRESS NOTES
[facility]  Office: [address], DO, Kimberly Coronado, DO, Bria Medley, DO, Seraoksana Smith Blood, DO, Janak Fernández MD, Evie Pacheco MD, Clemente Roque MD, Estrella White MD, Rema Hayward MD, Keon Waterman MD, Spike Ware MD, Fabian Griggs MD, Mena Blood MD, Alison Mason, DO, Kiara Myers MD, Marlon Peace, DO, Todd Mauricio MD,  Thiago Velazco DO, Sandro Baez MD, Ash Shafer MD, Vielka Quinones, [facility], [address], [facility], Rick Aquino, CNP, Francisco Waldron, CNS, Lathan Pallas, CNP, Rene Peterson, CNP, Vilma Caldwell, CNP, Mona Villarreal, CNP, Danae Cisneros, CNP, Paulo Dodge PA-C, Massimo Franco, [facility], Ayaan Macdonald, CNP, Tesha Larson, CNP, Daria Sweet, CNP, Leyda Amador, CNP, Darleen Freeman, CNP, [facility], [address] [place] [address]    Progress Note    3/11/2021    9:58 AM    Name:   Michelle Burton  MRN:     [id number]     Acct:      [de-identified]   Room:   [address] [place] [address] Day:  2  Admit Date:  3/9/2021  4:56 PM    PCP:   No primary care provider on file. Code Status:  Full Code    Subjective:     C/C:   Chief Complaint   Patient presents with    Fatigue     c/o weakness with heart pounding, pt states was told at her last chemo Tx hgb was 7, pt also c/o shoulder and back pain     Back Pain     Interval History Status: improved. Febrile overnight. T-max 100.8, otherwise VSS, on room air  1 unit packed red blood cells yesterday, hemoglobin stable and uptrending    Brief History:     57-year-old female with past medical history of recurrent ovarian cancer originally diagnosed in 2005 with multiple recurrences and diffuse metastasis presents with fatigue found to be anemic and was transfused with 1 unit of red blood cells. Patient currently also being treated for DVT found on January 26, 2021 with Eliquis as outpatient.   Has significant past medical history of intra-abdominal bleeding due to splenic mass status post coiling and embolization at outlying facility in October 2020. Review of Systems:     Constitutional:  negative for chills, fevers, sweats  Respiratory:  negative for cough, dyspnea on exertion, shortness of breath, wheezing  Cardiovascular:  negative for chest pain, chest pressure/discomfort, lower extremity edema, palpitations  Gastrointestinal:  negative for abdominal pain, constipation, diarrhea, nausea, vomiting  Neurological:  negative for dizziness, headache    Medications: Allergies:     Allergies   Allergen Reactions    Ceftriaxone      Had a rash on ceftriaxone December 2020, [facility]       Current Meds:   Scheduled Meds:    aspirin  81 mg Oral Nightly    lactobacillus  1 capsule Oral Daily    levETIRAcetam  1,500 mg Oral BID    [Held by provider] lisinopril  2.5 mg Oral Daily    metoprolol succinate  25 mg Oral Daily    pantoprazole  40 mg Oral BID    sertraline  100 mg Oral Daily    sodium chloride flush  10 mL Intravenous 2 times per day    atorvastatin  40 mg Oral Nightly    insulin lispro  0-6 Units Subcutaneous TID WC    insulin lispro  0-3 Units Subcutaneous Nightly    morphine  15 mg Oral BID    morphine  30 mg Oral BID     Continuous Infusions:    sodium chloride      sodium chloride      dextrose      sodium chloride 75 mL/hr at 03/10/21 2238     PRN Meds: sodium chloride, sodium chloride, glucose, dextrose, glucagon (rDNA), dextrose, sodium chloride flush, promethazine **OR** ondansetron, acetaminophen **OR** acetaminophen, magnesium hydroxide, potassium chloride **OR** potassium alternative oral replacement **OR** potassium chloride, potassium chloride, magnesium sulfate, nitroGLYCERIN, oxyCODONE-acetaminophen    Data:     Past Medical History:   has a past medical history of Anemia, Bleeding, Cervical cancer ([facility] [place] [address] Depression, Diabetes mellitus ([facility] [address] GERD (gastroesophageal reflux disease), Metastatic cancer ([facility] [address] Ovarian cancer [facility]), Post chemo evaluation, and Splenic lesion. Social History:   reports that she has been smoking cigarettes. She has been smoking about 1.00 pack per day. She uses smokeless tobacco. She reports previous alcohol use. She reports that she does not use drugs. Family History:   Family History   Problem Relation Age of Onset    Alcohol Abuse Mother     Cirrhosis Mother        Vitals:  BP (!) 123/54   Pulse 98   Temp 100.8 °F (38.2 °C) (Oral)   Resp 17   Ht 5' 5\" (1.651 m)   Wt 174 lb 3.2 oz (79 kg)   SpO2 98%   BMI 28.99 kg/m²   Temp (24hrs), Av.8 °F (37.1 °C), Min:98.1 °F (36.7 °C), Max:100.8 °F (38.2 °C)    Recent Labs     03/10/21  1138 03/10/21  1614 03/10/21  1949 21  0857   POCGLU 125* 96 144* 128*       I/O (24Hr): Intake/Output Summary (Last 24 hours) at 3/11/2021 0958  Last data filed at 3/11/2021 0645  Gross per 24 hour   Intake 1380 ml   Output 1558 ml   Net -178 ml       Labs:  Hematology:  Recent Labs     21  1743 21  1743 03/10/21  0440 03/10/21  0440 03/10/21  1934 21  0206 21  0827   WBC 9.9  --  7.8  --   --   --   --    RBC 3.19*  --  3.14*  --   --   --   --    HGB 7.2*   < > 7.5*   < > 7.4* 6.8* 8.7*   HCT 24.5*   < > 25.7*   < > 26.4* 23.8* 29.6*   MCV 76.8*  --  81.8*  --   --   --   --    MCH 22.6*  --  23.9*  --   --   --   --    MCHC 29.4  --  29.2  --   --   --   --    RDW 21.1*  --  20.1*  --   --   --   --    *  --  769*  --   --   --   --    MPV 10.0  --  9.9  --   --   --   --     < > = values in this interval not displayed.      Chemistry:  Recent Labs     03/09/21  1743 03/10/21  0218 03/10/21  0440     --  139   K 4.1  --  4.2   CL 96*  --  104   CO2 22  --  23   GLUCOSE 108*  --  75   BUN 11  --  8   CREATININE 0.47*  --  0.40*   MG  --   --  1.9   ANIONGAP 17  --  12   LABGLOM >60  --  >60   GFRAA >60  --  >60   CALCIUM 9.2  --  8.5*   TROPHS 8 10  --      Recent Labs     21  2211 03/10/21  6398 03/10/21  0440 03/10/21  0705 03/10/21  1138 03/10/21  1614 03/10/21  1949 03/11/21  0857   PROT 7.4  --   --  6.4  --   --   --   --   --    LABALBU 4.0  --   --  3.3*  --   --   --   --   --    LABA1C  --   --  6.0  --   --   --   --   --   --    TSH 2.93  --   --   --   --   --   --   --   --    AST 22  --   --  13  --   --   --   --   --    ALT 9  --   --  7  --   --   --   --   --    ALKPHOS 76  --   --  66  --   --   --   --   --    BILITOT <0.10*  --   --  0.20*  --   --   --   --   --    CHOL  --   --   --  131  --   --   --   --   --    HDL  --   --   --  33*  --   --   --   --   --    LDLCHOLESTEROL  --   --   --  69  --   --   --   --   --    CHOLHDLRATIO  --   --   --  4.0  --   --   --   --   --    TRIG  --   --   --  147  --   --   --   --   --    VLDL  --   --   --  NOT REPORTED  --   --   --   --   --    POCGLU  --  87  --   --  85 125* 96 144* 128*     ABG:  Lab Results   Component Value Date    POCPH 7.428 01/06/2021    POCPCO2 43.0 01/06/2021    POCPO2 98.9 01/06/2021    POCHCO3 28.4 01/06/2021    NBEA NOT REPORTED 01/06/2021    PBEA 4 01/06/2021    QTU6VNY 30 01/06/2021    HIMH2ZKV 98 01/06/2021    FIO2 30.0 01/06/2021     Lab Results   Component Value Date/Time    SPECIAL NOT REPORTED 03/10/2021 07:16 AM     Lab Results   Component Value Date/Time    CULTURE NO SIGNIFICANT GROWTH 03/10/2021 07:16 AM       Radiology:  Ct Head W Wo Contrast    Result Date: 3/9/2021  No acute intracranial abnormality. Ct Cervical Spine Wo Contrast    Result Date: 3/9/2021  1. No acute fracture. 2. Straightening of the normal cervical lordosis, which can be positional or related to muscle spasm. 3. Small lucent lesions within multiple cervical vertebral bodies are indeterminate. MRI with and without contrast may be useful further characterization. Xr Chest Portable    Result Date: 3/9/2021  Vague density retrocardiac space of uncertain etiology. No evidence of pneumothorax.      Ct Chest Abdomen Pelvis W Contrast    Result Date: 3/9/2021  Interval increase in number of subcentimeter pulmonary nodules throughout the lungs bilaterally compatible with progressive metastatic disease. Resolving left lower lobe infiltrate and left pleural effusion. Small right pleural effusion Stable moderate pericardial effusion. . Stable mediastinal, hilar, mesenteric, retroperitoneal, pelvic and left inguinal lymphadenopathy compatible with metastatic disease. Stable osteoblastic lesions in the spine. Mildly improved left lower lobe infiltrate and left pleural effusion Stable pleural/extrapleural mass along the anterior right chest wall near the manubriosternal junction Mild ascites Evidence of constipation Stable pigtail catheter in the complex splenic mass. No other significant changes are seen compared to the previous exams     Ct Lumbar Spine Trauma Reconstruction    Result Date: 3/9/2021  Stable appearance CT lumbar spine when compared to prior study     Ct Thoracic Spine Trauma Reconstruction    Result Date: 3/9/2021  Unremarkable CT of the thoracic spine.        Physical Examination:        General appearance:  alert, cooperative and no distress  Mental Status:  oriented to person, place and time and normal affect  Lungs:  clear to auscultation bilaterally, normal effort  Heart:  regular rate and rhythm, no murmur  Abdomen:  soft, nontender, nondistended, normal bowel sounds, no masses, hepatomegaly, splenomegaly  Extremities: Trace left lower extremity nonpitting edema, redness, tenderness in the calves  Skin:  no gross lesions, rashes, induration, port appreciated without access    Assessment:        Hospital Problems           Last Modified POA    * (Principal) Anemia 3/9/2021 Yes    Malignant neoplasm of ovary ([address] 3/9/2021 Yes    Seizure ([address] 3/9/2021 Yes    Type 2 diabetes mellitus without complication, without long-term current use of insulin ([address] 3/9/2021 Yes    Cancer, metastatic to bone ([address] 3/9/2021 Yes    Essential hypertension 3/9/2021 Yes    Fatigue 3/10/2021 Yes    Acute deep vein thrombosis (DVT) of femoral vein of left lower extremity ([address] 3/11/2021 Yes          Plan:        1. Anemia: Hemoglobin uptrending. Status post 1 unit packed red blood cells yesterday. Eliquis continues to be on hold  2. Acute left lower extremity DVT: External iliac and common femoral veins unable to fully anticoagulate secondary to #1. Vascular surgery consulted for IVC filter placement, patient not a candidate at this time due to high risk. Trial Eliquis again  3. Ovarian cancer metastatic spread to bone: Heme-onc following. 4. Essential hypertension: Currently stable. Continue metoprolol, lisinopril on hold  5. Diabetes mellitus type 2: A.m. blood sugar stable  6. Seizure: Without breakthrough seizure. Continue Keppra twice daily  7.  GI/DVT prophylaxis: Protonix, start Eliquis    ESTRELLITA Hinton NP  3/11/2021  9:58 AM

## 2021-03-11 NOTE — ADT AUTH CERT
Utilization Reviews       Chemotherapy - Care Day 2 (3/10/2021) by Austen Colbert RN       Review Status Review Entered   Completed 3/11/2021 12:49      Criteria Review      Care Day: 2 Care Date: 3/10/2021 Level of Care: Inpatient Floor    Guideline Day 2    Level Of Care    (X) Oncology unit or floor    3/11/2021 12:45 PM EST by Matt Vivas      ONC FLOOR    Clinical Status    ( ) * Hemodynamic stability    3/11/2021 12:45 PM EST by Matt Vivas      03/10/21 0501  98.6 (37)  15  75  95/48 97% RA  03/10/21 0730  97.9 (36.6)  16  82  90/37   03/10/21 1201  98.1 (36.7)  16  89  98/55   95% RA   03/10/21 1930  98.2 (36.8)  16  96  120/61 97% RA    ( ) * No evidence of dehydration    ( ) * Nausea and vomiting absent or reduced    (X) * Mental status at baseline or improved    ( ) * Pain absent or managed    3/11/2021 12:48 PM EST by Matt Vivas      PERCOCET 1 TAB X 5 DOSES  morphine (MS CONTIN) extended release tablet 30 mg BID  morphine (MS CONTIN) extended release tablet 15 mg BID    3/11/2021 12:45 PM EST by Matt Vivas      PAIN 5-7/10    ( ) * Infection absent or under adequate treatment    ( ) * Blood counts normal, stable, or improved    Routes    (X) IV fluids, medications    3/11/2021 12:49 PM EST by Matt Vivas      atorvastatin 40 mgOralNightly  insulin lispro 0-6 UnitsSubcutaneousTID WC  insulin lispro 0-3 UnitsSubcutaneousNightly    3/11/2021 12:48 PM EST by Matt Vivas      NS @ 75 ML/HR  aspirin 81 mgOralNightly  lactobacillus 1 capsuleOralDaily  levETIRAcetam 1,500 mgOralBID  lisinopril 2.5 mgOralDaily  metoprolol succinate 25 mgOralDaily  pantoprazole 40 mgOralBID  sertraline 100 mgOralDaily    (X) Diet as tolerated    3/11/2021 12:49 PM EST by Matt Vivas      ADA DIET    Interventions    (X) CBC and chemistries    3/11/2021 12:48 PM EST by Lupe Flynn Results for Malika Raymundo (MRN [id number]) as of 3/11/2021 12:45    3/10/2021 19:34  Hemoglobin Quant: 7.4 (L)  Hematocrit: 26.4 (L)    3/11/2021 02:06  Hemoglobin Quant: 6.8 (LL)  Hematocrit: 23.8 (L)    3/11/2021 12:45 PM EST by Cindy Bai Results for Hi Shipley (MRN [id number]) as of 3/11/2021 12:45    3/10/2021 08:09  Hemoglobin Quant: 7.1 (L)  Hematocrit: 24.7 (L)    3/10/2021 14:02  Hemoglobin Quant: 7.3 (L)  Hematocrit: 25.6 (L)    * Milestone   Additional Notes   3/10/21      ===HEMATOLOGY NOTE      The patient is Y 19 y.o.  female who is admitted to the hospital for anemia. PMH metastatic ovarian CA, DM, GERD, anemia from chemotherapy presented for concerns of weakness and malaise for past 4 days. She additionally has L shoulder pain radiating to back, decreased appetitie and nausea without vomiting or abd pain. Hgb was found to be 7.2 although was 7 after last chemotherapy treatment on 2/26/2021. CT head showed no acute intracranial abnormality. CT spine showed small lucent lesions within multiple cervical vertebral bodies. CT chest abdomen shows increase in number of subcentimeter pulmonary nodules throughout lung bilaterally compativile with progressive metastatic disease, resolving LLL infiltrate/effusion, but stable pericardial effusion, stable lymphadenopathy, stable osteoblastic lesions.  Oncology consulted for management of anemia.       IMPRESSION:     1. Malignant ovarian cancer with metastasis to bone:  recurrent ovarian cancer originally diagnosed in the year 2005 with multiple recurrences. Dione Campuzano has been treated with Doxil and Avastin.  Avastin was discontinued due to intra-abdominal bleeding.  Currently on gemcitabine treatment which has fewer side effects but does result in anemia   2. Metastatic disease to bone and lung: New lesions noted in lung, however no effect on breathing, other lesions appear stable at this time   3. Anemia: From gemcitabine, stable, no need for additional treatment at this time   4.  DVT: On left, placed on Eliquis outpatient, unlikely cause of anemia at this time               RECOMMENDATIONS:   1. Given stability of hemoglobin, no active bleed, symptoms minimal and mostly resolved, unlikely problem at this time, anemia is chronic and likely result of gemcitabine, will continue treatment as outpatient   2. From our standpoint okay to continue anticoagulation at this time           ===VASCULAR NOTE      Assessment and Plan:       Left lower extremity DVT   Vascular surgery consulted for possible placement of IVC filter    Will review doppler ultrasound    Patient unable to be on chemical anticoagulation secondary to anemia requiring blood transfusions           General Surgery Resident Statement/Note:   I have discussed the case, including pertinent history and exam findings with the above resident and have personally seen the patient.        Pt seen and examined at bedside.  I performed both history and physical exam. Patient with poor prognosis and metastatic ovarian ca and anemia requiring transfusions. She has reported hx of LE DVT on eliquis which has been held. She is s/p splenic artery embolization. At this time, patient has very poor prognosis and is high risk for surgical intervention. Would not recommend IVC filter placement. OK to hold [facility] [place] operated by [facility] as she is requiring transfusions. Will follow up duplex imaging. ==INTERNAL MED NOTE      C/C:       Chief Complaint   Patient presents with   · Fatigue       c/o weakness with heart pounding, pt states was told at her last chemo Tx hgb was 7, pt also c/o shoulder and back pain    · Back Pain       Interval History Status: not changed.        Patient seen examined bedside.  Hemoglobin stable however still low.  Currently 7.3 on review patient generally around 10. 4.  Discussed with patient about DVT.  States that she was found to have a DVT on her left side confirmed as outpatient.  Was started on Eliquis in January of this year      Assessment:             Hospital Problems            Last Modified POA   * (Principal) Anemia 3/9/2021 Yes     Malignant neoplasm of ovary ([facility] [address] 3/9/2021 Yes     Seizure ([facility] [address] 3/9/2021 Yes     Type 2 diabetes mellitus without complication, without long-term current use of insulin ([facility] [address] 3/9/2021 Yes     Cancer, metastatic to bone ([facility] [address] 3/9/2021 Yes     Essential hypertension 3/9/2021 Yes               Plan:          1. Acute anemia secondary to Eliquis use as well as recent chemotherapy due to recurrent malignant neoplasm with metastasis and recurrences of ovarian cancer.  Appreciate hematology oncology recommendations.  Hold Eliquis, monitor hemoglobin hematocrit, transfuse if less than 7.  Consult to vascular surgery for possible IVC filter   2. Hypertension.  Coronary artery disease.  Aspirin 81 mg, Lipitor 40 mg, hold lisinopril as blood pressure is borderline continue Toprol-XL 25 mg,    3. chronic pain continue home medications of MS Contin   4. Seizure prophylaxis with Keppra   5. Depression.  Continue Zoloft   6. Continue gentle IV hydration, consult to dietary. 7. Discussed with the patient as well as discussed with son who was on the phone during examination. Kandis Lemus will try to obtain lower extremity Dopplers from outpatient.  In the interim we will order new lower extremity Dopplers.          Results for Carney Spurling (MRN [id number]) as of 3/11/2021 12:45      3/10/2021 02:18   Troponin, High Sensitivity: 10      Hemoglobin A1C: 6.0   eAG (mg/dL): 126   Hemoglobin Quant: 7.6 (L)   Hematocrit: 27.1 (L)      3/10/2021 04:40   Sodium: 139   Potassium: 4.2   Chloride: 104   CO2: 23   BUN: 8   Creatinine: 0.40 (L)      Anion Gap: 12   GFR Non-: >60   GFR African American: >60   Magnesium: 1.9   Glucose: 75   Calcium: 8.5 (L)   Albumin/Globulin Ratio: 1.1   Total Protein: 6.4      Chol/HDL Ratio: 4.0   Cholesterol: 131   HDL Cholesterol: 33 (L)   LDL Cholesterol: 69   Triglycerides: 147      Albumin: 3.3 (L)   Alk Phos: 66   ALT: 7   AST: 13   Bilirubin: 0.20 (L) WBC: 7.8   RBC: 3.14 (L)   Hemoglobin Quant: 7.5 (L)   Hematocrit: 25.7 (L)   MCV: 81.8 (L)   MCH: 23.9 (L)   MCHC: 29.2   MPV: 9.9   RDW: 20.1 (H)   Platelet Count: 815 (H)         3/10/2021 05:00   Color, UA: YELLOW   Turbidity UA: CLEAR   Glucose, UA: NEGATIVE   Bilirubin, Urine: NEGATIVE   Ketones, Urine: NEGATIVE   Specific Gravity, UA: 1.024   pH, UA: 6.0   Protein, UA: NEGATIVE   Urobilinogen, Urine: Normal   Nitrite, Urine: NEGATIVE   Leukocyte Esterase, Urine: NEGATIVE   WBC, UA: None   RBC, UA: None   Epithelial Cells, UA: 0 TO 2      Urine Hgb: NEGATIVE      3/10/2021 05:37   EKG 12-LEAD: Rpt   EKG REPORT: Attch   Atrial Rate: 80   P Axis: 54   P-R Interval: 180   Q-T Interval: 402   QRS Duration: 84   QTc Calculation (Bazett): 463   R Axis: 31   T Axis: 71   Ventricular Rate: 80         3/10/2021 08:09   Hemoglobin Quant: 7.1 (L)   Hematocrit: 24.7 (L)         3/10/2021 14:02   Hemoglobin Quant: 7.3 (L)   Hematocrit: 25.6 (L)         3/10/2021 15:18   VL LOWER EXTREMITY BILATERAL VENOUS DUPLEX: Rpt   Summary        No evidence of superficial or deep venous thrombosis in the right lower    extremity.    Acute deep vein thrombosis of the left leg involving the external iliac    and common femoral veins.    Superficial thrombophlebitis of the lower extremity involving the left    greater saphenous vein.    Enlarged lymph nodes noted in the left groin.                3/10/2021 16:14   POC Glucose: 96      3/10/2021 19:34   Hemoglobin Quant: 7.4 (L)   Hematocrit: 26.4 (L)      3/10/2021 19:49   POC Glucose: 144 (H)            PA LETTER OF RECOMMENDATION by Javy Willams RN       Review Status Review Entered   In Primary 3/10/2021 15:35      Criteria Review           We recommend that the following pt's current hospitalization under Inpatient status is APPROPRIATE .                          Name: [facility]        : 1965        CSN: [id number]        INSURANCE: [employer]               60 F       Metastatic ovarian cancer       Fatigue       Anemia       Hemoglobin 7.                       Persistent hypotension        Episodes of tachypnea, tachycardia               PRBC transfusion                       MCG criteria applies Y               This chart was reviewed at 2:38 PM 3/10/2021               Kailee Echols MD        Physician Advisor        [facility]        CELL : [phone]

## 2021-03-11 NOTE — PROGRESS NOTES
Patient educated on need for stool sample. Patient states that she will let writer know when she can produce that. Patient refusing ambulation this evening and states that she is too tired.

## 2021-03-11 NOTE — PLAN OF CARE
Problem: Activity:  Goal: Fatigue will decrease  Description: Fatigue will decrease  3/11/2021 0518 by Yo West RN  Outcome: Ongoing  3/10/2021 1523 by Loreto Harper RN  Outcome: Ongoing  Goal: Ability to tolerate increased activity will improve  Description: Ability to tolerate increased activity will improve  3/11/2021 0518 by Yo West RN  Outcome: Ongoing  3/10/2021 1523 by Loreto Harper RN  Outcome: Ongoing  Goal: Ability to maintain optimal joint mobility will improve  Description: Ability to maintain optimal joint mobility will improve  3/11/2021 0518 by Yo West RN  Outcome: Ongoing  3/10/2021 1523 by Loreto Harper RN  Outcome: Ongoing     Problem:  Bowel/Gastric:  Goal: Ability to achieve a regular elimination pattern will improve  Description: Ability to achieve a regular elimination pattern will improve  3/11/2021 0518 by Yo West RN  Outcome: Ongoing  3/10/2021 1523 by Loreto Harper RN  Outcome: Ongoing     Problem: Cardiac:  Goal: Ability to maintain an adequate cardiac output will improve  Description: Ability to maintain an adequate cardiac output will improve  3/11/2021 0518 by Yo West RN  Outcome: Ongoing  3/10/2021 1523 by Loreto Harper RN  Outcome: Ongoing  Goal: Ability to maintain adequate ventilation will improve  Description: Ability to maintain adequate ventilation will improve  3/11/2021 0518 by Yo West RN  Outcome: Ongoing  3/10/2021 1523 by Loreto Harper RN  Outcome: Ongoing  Goal: Ability to achieve and maintain adequate cardiopulmonary perfusion will improve  Description: Ability to achieve and maintain adequate cardiopulmonary perfusion will improve  3/11/2021 0518 by Yo West RN  Outcome: Ongoing  3/10/2021 1523 by Loreto Harper RN  Outcome: Ongoing     Problem: Coping:  Goal: Ability to adjust to condition or change in health will improve  Description: Ability to adjust to condition or change in health will improve  3/11/2021 0518 by Hunter Gomez Leticia Serrano RN  Outcome: Ongoing  3/10/2021 1523 by Hilaria Jeffery RN  Outcome: Ongoing  Goal: Communication of feelings regarding changes in body function or appearance will improve  Description: Communication of feelings regarding changes in body function or appearance will improve  3/11/2021 0518 by Lary Mendoza RN  Outcome: Ongoing  3/10/2021 1523 by Hilaria Jeffery RN  Outcome: Ongoing     Problem: Health Behavior:  Goal: Identification of resources available to assist in meeting health care needs will improve  Description: Identification of resources available to assist in meeting health care needs will improve  3/11/2021 0518 by Lary Mendoza RN  Outcome: Ongoing  3/10/2021 1523 by Hilaria Jeffery RN  Outcome: Ongoing     Problem: Nutritional:  Goal: Maintenance of adequate nutrition will improve  Description: Maintenance of adequate nutrition will improve  3/11/2021 0518 by Lary Mendoza RN  Outcome: Ongoing  3/10/2021 1523 by Hilaria Jeffery RN  Outcome: Ongoing     Problem: Physical Regulation:  Goal: Signs and symptoms of infection will decrease  Description: Signs and symptoms of infection will decrease  3/11/2021 0518 by Lary Mendoza RN  Outcome: Ongoing  3/10/2021 1523 by Hilaria Jeffery RN  Outcome: Ongoing  Goal: Will show no signs and symptoms of excessive bleeding  Description: Will show no signs and symptoms of excessive bleeding  3/11/2021 0518 by Lary Mendoza RN  Outcome: Ongoing  3/10/2021 1523 by Hilaria Jeffery RN  Outcome: Ongoing  Goal: Complications related to the disease process, condition or treatment will be avoided or minimized  Description: Complications related to the disease process, condition or treatment will be avoided or minimized  3/11/2021 0518 by Lary Mendoza RN  Outcome: Ongoing  3/10/2021 1523 by Hilaria Jeffery RN  Outcome: Ongoing     Problem: Safety:  Goal: Ability to remain free from injury will improve  Description: Ability to remain free from injury will improve  3/11/2021 1753 by Chi John RN  Outcome: Ongoing  3/10/2021 1523 by Ricky Parnell RN  Outcome: Ongoing     Problem: Sensory:  Goal: Pain level will decrease  Description: Pain level will decrease  3/11/2021 0518 by Chi John RN  Outcome: Ongoing  3/10/2021 1523 by Ricky Parnell RN  Outcome: Ongoing  Goal: General experience of comfort will improve  Description: General experience of comfort will improve  3/11/2021 0518 by Chi John RN  Outcome: Ongoing  3/10/2021 1523 by Ricky Parnell RN  Outcome: Ongoing     Problem: Skin Integrity:  Goal: Skin integrity will improve  Description: Skin integrity will improve  3/11/2021 0518 by Chi John RN  Outcome: Ongoing  3/10/2021 1523 by Ricky Parnell RN  Outcome: Ongoing  Goal: Signs of wound healing will improve  Description: Signs of wound healing will improve  3/11/2021 0518 by Chi John RN  Outcome: Ongoing  3/10/2021 1523 by Ricky Parnell RN  Outcome: Ongoing

## 2021-03-11 NOTE — PROGRESS NOTES
PROGRESS NOTE                   Date:                           3/11/2021  Patient name:           Xiomara Mendoza [facility]  Date of admission:  3/9/2021  4:56 PM  MRN:   [id number]  YOB: 1965  PCP:                           No primary care provider on file. Subjective:       Patient seen and examined  No specific complaints at this time  Received blood transfusion  Febrile this morning, patient was reporting feeling hot, denies it being related temporally to transfusion time  Denies any productive cough, sputum, urinary symptoms      INITIAL HISTORY    The patient is a 60 y.o.  female who is admitted to the hospital for anemia. PMH metastatic ovarian CA, DM, GERD, anemia from chemotherapy presented for concerns of weakness and malaise for past 4 days. She additionally has L shoulder pain radiating to back, decreased appetitie and nausea without vomiting or abd pain. Hgb was found to be 7.2 although was 7 after last chemotherapy treatment on 2/26/2021. CT head showed no acute intracranial abnormality. CT spine showed small lucent lesions within multiple cervical vertebral bodies. CT chest abdomen shows increase in number of subcentimeter pulmonary nodules throughout lung bilaterally compativile with progressive metastatic disease, resolving LLL infiltrate/effusion, but stable pericardial effusion, stable lymphadenopathy, stable osteoblastic lesions.   Oncology consulted for management of anemia.     Patient has history of recurrent ovarian cancer originally diagnosed in the year 2005 with multiple recurrences. Margaret Little has been treated with Doxil Avastin. Debbie Dubonnet was discontinued due to intra-abdominal bleeding.  Patient also has history of seizure disorder.  Work-up did not reveal any brain metastasis.  Patient also recently had heart catheterization which did not show any critical stenosis.  Patient was thought to be a candidate for anthracycline.         Problem Lists:   Primary Problem:  Anemia   Current Problems:  Active Hospital Problems    Diagnosis Date Noted    Acute deep vein thrombosis (DVT) of femoral vein of left lower extremity (HCC) [I82.412] 03/11/2021    Fatigue [R53.83]     Cancer, metastatic to bone ([facility] [address] [C79.51] 01/28/2021    Essential hypertension [I10] 01/28/2021    Anemia [D64.9]     Type 2 diabetes mellitus without complication, without long-term current use of insulin ([facility] [address] [E11.9]     Seizure ([address] [R56.9] 10/21/2020    Malignant neoplasm of ovary ([address] [C56.9] 08/17/2020     PMH:  Past Medical History:   Diagnosis Date    Anemia     Bleeding 10/2020    intra-abdominal bleeding -due to splenic mass with GI infiltration. Status post embolization    Cervical cancer ([address]     Depression     Diabetes mellitus ([address]     GERD (gastroesophageal reflux disease)     Metastatic cancer ([facility] [address] 10/2020    extensive intraabdominal and splenic involvement and lung mets.  Ovarian cancer ([address]     low grade serous ovarian carcinoma    Post chemo evaluation     2007: Chemo via med onc (Dr. Doc Pham), 2008: Lionel [place] due to rising CA-125, 2013: intraperitoneal chemo,12/2015: Ca125 - 25     Splenic lesion       Allergies:    Allergies   Allergen Reactions    Ceftriaxone      Had a rash on ceftriaxone December 2020, [facility]        Medications:   Scheduled Meds:   senna  1 tablet Oral Nightly    apixaban  5 mg Oral BID    aspirin  81 mg Oral Nightly    lactobacillus  1 capsule Oral Daily    levETIRAcetam  1,500 mg Oral BID    [Held by provider] lisinopril  2.5 mg Oral Daily    metoprolol succinate  25 mg Oral Daily    pantoprazole  40 mg Oral BID    sertraline  100 mg Oral Daily    sodium chloride flush  10 mL Intravenous 2 times per day    atorvastatin  40 mg Oral Nightly    insulin lispro  0-6 Units Subcutaneous TID WC    insulin lispro  0-3 Units Subcutaneous Nightly    morphine  15 mg Oral BID    morphine  30 mg Oral BID     PRN Medications: sodium chloride, sodium chloride, glucose, dextrose, glucagon (rDNA), dextrose, sodium chloride flush, promethazine **OR** ondansetron, acetaminophen **OR** acetaminophen, magnesium hydroxide, potassium chloride **OR** potassium alternative oral replacement **OR** potassium chloride, potassium chloride, magnesium sulfate, nitroGLYCERIN, oxyCODONE-acetaminophen  Continuous Infusions:   sodium chloride      sodium chloride      dextrose      sodium chloride 75 mL/hr at 03/10/21 9600           Objective:     Vitals: BP (!) 123/54   Pulse 98   Temp 100.8 °F (38.2 °C) (Oral)   Resp 17   Ht 5' 5\" (1.651 m)   Wt 174 lb 3.2 oz (79 kg)   SpO2 98%   BMI 28.99 kg/m²     General appearance - well appearing, no in pain or distress   Mental status - alert and cooperative   Eyes - pupils equal and reactive, extraocular eye movements intact   Ears - bilateral TM's and external ear canals normal   Mouth - mucous membranes moist, pharynx normal without lesions   Neck - supple, no significant adenopathy   Lymphatics - no palpable lymphadenopathy, no hepatosplenomegaly   Chest - clear to auscultation, no wheezes, rales or rhonchi, symmetric air entry   Heart - normal rate, regular rhythm, normal S1, S2, no murmurs, rubs, clicks or gallops   Abdomen - soft, nontender, nondistended, no masses or organomegaly   Neurological - alert, oriented, normal speech, no focal findings or movement disorder noted   Musculoskeletal - no joint tenderness, deformity or swelling   Extremities - peripheral pulses normal, no pedal edema, no clubbing or cyanosis   Skin - normal coloration and turgor, no rashes, no suspicious skin lesions noted ,     Data:    No intake/output data recorded.   In: 1380 [P.O.:480; I.V.:900]  Out: 953 [Urine:950; Drains:3]    CBC:   Recent Labs     03/09/21  1743 03/09/21  1743 03/10/21  0440 03/10/21  0440 03/11/21  0206 03/11/21  0827 03/11/21  1348   WBC 9.9  --  7.8  --   --   --   --    HGB 7.2*   < > 7.5*   < > 6. 8* 8.7* 8.9*   *  --  769*  --   --   --   --     < > = values in this interval not displayed. BMP:    Recent Labs     03/09/21  1743 03/10/21  0440    139   K 4.1 4.2   CL 96* 104   CO2 22 23   BUN 11 8   CREATININE 0.47* 0.40*   GLUCOSE 108* 75     Hepatic:   Recent Labs     03/09/21  1743 03/10/21  0440   AST 22 13   ALT 9 7   BILITOT <0.10* 0.20*   ALKPHOS 76 66     INR: No results for input(s): INR in the last 72 hours. PTT:No results for input(s): PTT in the last 72 hours. Labs:  General Labs:  No results for input(s): LABIRON, TIBC, FERRITIN, [id number], FOLATE, OCCULTBLD in the last 72 hours. Interval increase in number of subcentimeter pulmonary nodules throughout the   lungs bilaterally compatible with progressive metastatic disease. Resolving   left lower lobe infiltrate and left pleural effusion. Small right pleural   effusion       Stable moderate pericardial effusion. .       Stable mediastinal, hilar, mesenteric, retroperitoneal, pelvic and left   inguinal lymphadenopathy compatible with metastatic disease.       Stable osteoblastic lesions in the spine.       Mildly improved left lower lobe infiltrate and left pleural effusion       Stable pleural/extrapleural mass along the anterior right chest wall near the   manubriosternal junction       Mild ascites       Evidence of constipation       Stable pigtail catheter in the complex splenic mass.       No other significant changes are seen compared to the previous exams          Assessment    1. Malignant ovarian cancer with metastasis to bone:  recurrent ovarian cancer originally diagnosed in the year 2005 with multiple recurrences. Rhoda Lipoma has been treated with Doxil and Avastin. Wayland Prudent was discontinued due to intra-abdominal bleeding. Currently on gemcitabine treatment which has fewer side effects but does result in anemia  2.  Metastatic disease to bone and lung: New lesions noted in lung, however no effect on breathing, other lesions appear stable at this time  3. Anemia: From gemcitabine, stable, no need for additional treatment at this time  4. DVT: On left, placed on Eliquis outpatient, unlikely cause of anemia at this time        Plan     1. Given stability of hemoglobin, no active bleed, symptoms minimal and mostly resolved, unlikely problem at this time, anemia is chronic and likely result of gemcitabine, will continue treatment as outpatient  2. From our standpoint okay to continue anticoagulation at this time                [facility]  Cell Phone [phone]  Attending Physician Statement  The patient was seen and examined during rounds, I have discussed the care of Brandy Renee, including pertinent history and exam findings with the resident. I have reviewed the key elements of all parts of the encounter with the resident. I agree with the assessment, and status of the problem list as documented.    Additional assessment/ plan        Nydia Jauregui MD  Hematology Oncology  [phone]  Electronically signed by Joey Curry MD on 3/11/2021 at 10:41 PM

## 2021-03-11 NOTE — PROGRESS NOTES
Vascular Surgery Progress Note            PATIENT NAME: Damari Bullard [facility]     TODAY'S DATE: 3/11/2021, 5:21 AM    SUBJECTIVE:    Pt seen and examined. No new concerns or complaints today. Vitals stable. Afebrile. Received 1 uPRBC overnight. Denies chest pain, shortness of breath, lower extremity pain       OBJECTIVE:   Vitals:  /61   Pulse 95   Temp 98.9 °F (37.2 °C) (Oral)   Resp 18   Ht 5' 5\" (1.651 m)   Wt 158 lb 8 oz (71.9 kg)   SpO2 94%   BMI 26.38 kg/m²      INTAKE/OUTPUT:      Intake/Output Summary (Last 24 hours) at 3/11/2021 0521  Last data filed at 3/10/2021 1804  Gross per 24 hour   Intake 931 ml   Output 1265 ml   Net -334 ml             General: AOx3, NAD  Lungs: Symmetrical chest expansion, no audible wheezing   Heart: Regular rate  Abdomen: Non-distended  Extremity: moves all extremities x4 without pain, No edema; Palpable DP and PT pulses bilaterally. Toes warm and well perfused.       Data:  CBC:   Lab Results   Component Value Date    WBC 7.8 03/10/2021    RBC 3.14 03/10/2021    HGB 6.8 03/11/2021    HCT 23.8 03/11/2021    MCV 81.8 03/10/2021    MCH 23.9 03/10/2021    MCHC 29.2 03/10/2021    RDW 20.1 03/10/2021     03/10/2021    MPV 9.9 03/10/2021     CBC with Differential:    Lab Results   Component Value Date    WBC 7.8 03/10/2021    RBC 3.14 03/10/2021    HGB 6.8 03/11/2021    HCT 23.8 03/11/2021     03/10/2021    MCV 81.8 03/10/2021    MCH 23.9 03/10/2021    MCHC 29.2 03/10/2021    RDW 20.1 03/10/2021    NRBC 1 03/09/2021    LYMPHOPCT 14 03/09/2021    MONOPCT 15 03/09/2021    BASOPCT 0 03/09/2021    MONOSABS 1.49 03/09/2021    LYMPHSABS 1.39 03/09/2021    EOSABS 0.10 03/09/2021    BASOSABS 0.00 03/09/2021    DIFFTYPE NOT REPORTED 03/09/2021     WBC:    Lab Results   Component Value Date    WBC 7.8 03/10/2021     CMP:    Lab Results   Component Value Date     03/10/2021    K 4.2 03/10/2021     03/10/2021    CO2 23 03/10/2021    BUN 8 03/10/2021 CREATININE 0.40 03/10/2021    GFRAA >60 03/10/2021    LABGLOM >60 03/10/2021    GLUCOSE 75 03/10/2021    PROT 6.4 03/10/2021    LABALBU 3.3 03/10/2021    CALCIUM 8.5 03/10/2021    BILITOT 0.20 03/10/2021    ALKPHOS 66 03/10/2021    AST 13 03/10/2021    ALT 7 03/10/2021     BMP:    Lab Results   Component Value Date     03/10/2021    K 4.2 03/10/2021     03/10/2021    CO2 23 03/10/2021    BUN 8 03/10/2021    LABALBU 3.3 03/10/2021    CREATININE 0.40 03/10/2021    CALCIUM 8.5 03/10/2021    GFRAA >60 03/10/2021    LABGLOM >60 03/10/2021    GLUCOSE 75 03/10/2021     Potassium:    Lab Results   Component Value Date    K 4.2 03/10/2021     Magnesium:    Lab Results   Component Value Date    MG 1.9 03/10/2021     Phosphorus:  No results found for: PHOS      ASSESSMENT   Patient Active Problem List   Diagnosis    Malignant ne[facility] of [facility] ([facility] [address]    Seizure ([facility] [address]    Type 2 diabetes mellitus without complication, without long-term current use of insulin (HCC)    Anemia    Splenic abscess    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)    Acute encephalopathy    PRES (posterior reversible encephalopathy syndrome)    Hemianopia, bitemporal    Encephalopathy    Status epilepticus (HCC)    History of malignant neoplasm of ovary    Pleural effusion    Bandemia    Cancer, metastatic to bone (HCC)    Essential hypertension    Intractable low back pain    Fatigue       1. Left lower extremity DVT     PLAN    1. Doppler results: Acute DVT of left leg involving external iliac and common femoral veins. Superficial thrombophlebitis of lower extremity involving left greater saphenous vein  2. No vascular surgery intervention at this time given high risk for surgery  3.  Will continue to monitor patient    Electronically signed by Yesenia Barry DO  on 3/11/2021 at 5:21 AM       General Surgery Resident Statement/Note:  I have discussed the case, including pertinent history and exam findings with the above resident and have personally seen the patient.      Pt seen and examined at bedside. I performed both history and physical exam. Stable. No complaints this AM. Did receive another transfusion overnight. Ideally patient should be on anticoagulation given the extent of DVT however her anemia and continued transfusion requirements necessitate holding AC. No plan for filter placement at this time.      Twin Barnes MD  General Surgery PGY3

## 2021-03-11 NOTE — PROGRESS NOTES
Comprehensive Nutrition Assessment    Type and Reason for Visit:  Initial, Consult(Poor PO)    Nutrition Recommendations/Plan: Continue current Carb Control diet. Will provide Ensure Enlive TID. Will monitor for updated weight. Nutrition Assessment:  Pt admitted to monitor/treat anemia - c/o fatigue and back pain. Presents with metastatic ovarian cancer. Last palliative chemo treatment was completed 2/26. PMHx of DM. RD consulted for poor PO. Using admit weight, Pt has lost 18% of weight in 5 months (since cancer dx in October) - Pt was aware of significant wt loss. Noted large discrepency from admission weight and today's weight (158 lbs to 174 lbs x 1 day) - Will monitor for updated weight. Pt reports no recent change in appetite or taste preferences. Pt reports consumption of 70% of meals. Pt requests Chocolate Ensures on tray - Had received them during last admission and has been drinking them at home. Per EMR pt has constipation and is receiving Culturelle. Pt has low energy today with little desire to ambulate per RN. Unable to clearly assess fat/muscle loss at time of visit. Will continue to monitor. Malnutrition Assessment:  Malnutrition Status:  Insufficient Data   Context:  Chronic Illness     Findings of the 6 clinical characteristics of malnutrition:  Energy Intake:  Mild decrease in energy intake (Comment)  Weight Loss:  7 - Greater than 7.5% over 3 months     Body Fat Loss:  Unable to assess     Muscle Mass Loss:  Unable to assess    Fluid Accumulation:  1 - Mild(Not related to malnutrition) Extremities(LLE)   Strength:   Not performed    Estimated Daily Nutrient Needs:  Energy (kcal):  25-30 kcal/kg = 9721-2963; Weight Used for Energy Requirements:  Current     Protein (g):  1.5-1.8 g/kg = 100-130 g protein/day; Weight Used for Protein Requirements:  Current(1.5-1.8)          Nutrition Related Findings:  Labs: Hemoglobin 8.7, Hematocrit 29.6%. Meds: Culturelle.       Wounds:  None Current Nutrition Therapies:    DIET CARB CONTROL; No Caffeine    Anthropometric Measures:  · Height: 5' 5\" (165.1 cm)  · Current Body Weight: 174 lb 3.2 oz (79 kg)   · Admission Body Weight: 158 lb 8.2 oz (71.9 kg)    · Usual Body Weight: 192 lb (87.1 kg)(10/22/2020)     · Ideal Body Weight: 125 lbs; % Ideal Body Weight 139.4 %   · BMI: 29  · BMI Categories: Overweight (BMI 25.0-29. 9)       Nutrition Diagnosis:   · Unintended weight loss related to (cancer diagnosis and treatment) as evidenced by (~18% weight loss in 5 months)    · Inadequate oral intake related to (Current condition, low energy) as evidenced by intake 51-75%, poor intake prior to admission(need for ONS)    Nutrition Interventions:   Food and/or Nutrient Delivery:  Continue Current Diet, Start Oral Nutrition Supplement(Chocolate Ensure Enlive TID)  Nutrition Education/Counseling:  No recommendation at this time   Coordination of Nutrition Care:  Continue to monitor while inpatient    Goals:  Pt to meet % of estimated nutrition needs. ~Nutr goal set. Nutrition Monitoring and Evaluation:   Food/Nutrient Intake Outcomes:  Food and Nutrient Intake, Supplement Intake  Physical Signs/Symptoms Outcomes:  Biochemical Data, GI Status, Nutrition Focused Physical Findings, Skin, Weight     Discharge Planning:     Too soon to determine     Electronically signed by Onel Singh on 3/11/21 at 10:45 AM EST    Contact: [phone]

## 2021-03-11 NOTE — PLAN OF CARE
Problem: Activity:  Goal: Fatigue will decrease  Description: Fatigue will decrease  3/11/2021 1648 by Abril Menon RN  Outcome: Ongoing  3/11/2021 0518 by Mj Jennings RN  Outcome: Ongoing  Goal: Ability to tolerate increased activity will improve  Description: Ability to tolerate increased activity will improve  3/11/2021 1648 by Abril Menon RN  Outcome: Ongoing  3/11/2021 0518 by Mj Jennings RN  Outcome: Ongoing  Goal: Ability to maintain optimal joint mobility will improve  Description: Ability to maintain optimal joint mobility will improve  3/11/2021 1648 by Abril Menon RN  Outcome: Ongoing  3/11/2021 0518 by Mj Jennings RN  Outcome: Ongoing     Problem:  Bowel/Gastric:  Goal: Ability to achieve a regular elimination pattern will improve  Description: Ability to achieve a regular elimination pattern will improve  3/11/2021 1648 by Abril Menon RN  Outcome: Ongoing  3/11/2021 0518 by Mj Jennings RN  Outcome: Ongoing     Problem: Cardiac:  Goal: Ability to maintain an adequate cardiac output will improve  Description: Ability to maintain an adequate cardiac output will improve  3/11/2021 1648 by Abril Menon RN  Outcome: Ongoing  3/11/2021 0518 by Mj Jennings RN  Outcome: Ongoing  Goal: Ability to maintain adequate ventilation will improve  Description: Ability to maintain adequate ventilation will improve  3/11/2021 1648 by Abril Menon RN  Outcome: Ongoing  3/11/2021 0518 by Mj Jennings RN  Outcome: Ongoing  Goal: Ability to achieve and maintain adequate cardiopulmonary perfusion will improve  Description: Ability to achieve and maintain adequate cardiopulmonary perfusion will improve  3/11/2021 1648 by Abril Menon RN  Outcome: Ongoing  3/11/2021 0518 by Mj Jennings RN  Outcome: Ongoing     Problem: Coping:  Goal: Ability to adjust to condition or change in health will improve  Description: Ability to adjust to condition or change in health will improve  3/11/2021 1648 by Tiny Moya Charl Boeck, RN  Outcome: Ongoing  3/11/2021 0518 by Maribel Sandoval RN  Outcome: Ongoing  Goal: Communication of feelings regarding changes in body function or appearance will improve  Description: Communication of feelings regarding changes in body function or appearance will improve  3/11/2021 1648 by Maite Fitzgerald RN  Outcome: Ongoing  3/11/2021 0518 by Maribel Sandoval RN  Outcome: Ongoing     Problem: Health Behavior:  Goal: Identification of resources available to assist in meeting health care needs will improve  Description: Identification of resources available to assist in meeting health care needs will improve  3/11/2021 1648 by Maite Fitzgerald RN  Outcome: Ongoing  3/11/2021 0518 by Maribel Sandoval RN  Outcome: Ongoing     Problem: Nutritional:  Goal: Maintenance of adequate nutrition will improve  Description: Maintenance of adequate nutrition will improve  3/11/2021 1648 by Maite Fitzgerald RN  Outcome: Ongoing  3/11/2021 0518 by Maribel Sandoval RN  Outcome: Ongoing     Problem: Physical Regulation:  Goal: Signs and symptoms of infection will decrease  Description: Signs and symptoms of infection will decrease  3/11/2021 1648 by Maite Fitzgerald RN  Outcome: Ongoing  3/11/2021 0518 by Maribel Sandoval RN  Outcome: Ongoing  Goal: Will show no signs and symptoms of excessive bleeding  Description: Will show no signs and symptoms of excessive bleeding  3/11/2021 1648 by Maite Fitzgerald RN  Outcome: Ongoing  3/11/2021 0518 by Maribel Sandoval RN  Outcome: Ongoing  Goal: Complications related to the disease process, condition or treatment will be avoided or minimized  Description: Complications related to the disease process, condition or treatment will be avoided or minimized  3/11/2021 1648 by Maite Fitzgerald RN  Outcome: Ongoing  3/11/2021 0518 by Maribel Sandoval RN  Outcome: Ongoing     Problem: Safety:  Goal: Ability to remain free from injury will improve  Description: Ability to remain free from injury will improve  3/11/2021 1648 by Liz Rhodes RN  Outcome: Ongoing  3/11/2021 0518 by Brandi Liz RN  Outcome: Ongoing     Problem: Sensory:  Goal: Pain level will decrease  Description: Pain level will decrease  3/11/2021 1648 by Liz Rhodes RN  Outcome: Ongoing  3/11/2021 0518 by Brandi Liz RN  Outcome: Ongoing  Goal: General experience of comfort will improve  Description: General experience of comfort will improve  3/11/2021 1648 by Liz Rhodes RN  Outcome: Ongoing  3/11/2021 0518 by Brandi Liz RN  Outcome: Ongoing     Problem: Skin Integrity:  Goal: Skin integrity will improve  Description: Skin integrity will improve  3/11/2021 1648 by Liz Rhodes RN  Outcome: Ongoing  3/11/2021 0518 by Brandi Liz RN  Outcome: Ongoing  Goal: Signs of wound healing will improve  Description: Signs of wound healing will improve  3/11/2021 1648 by Liz Rhodes RN  Outcome: Ongoing  3/11/2021 0518 by Brandi Liz RN  Outcome: Ongoing     Problem: Tissue Perfusion:  Goal: Ability to maintain adequate tissue perfusion will improve  Description: Ability to maintain adequate tissue perfusion will improve  3/11/2021 1648 by Liz Rhodes RN  Outcome: Ongoing  3/11/2021 0518 by Brandi Liz RN  Outcome: Ongoing  Goal: Ability to maintain a stable neurologic state will improve  Description: Ability to maintain a stable neurologic state will improve  3/11/2021 1648 by Liz Rhodes RN  Outcome: Ongoing  3/11/2021 0518 by Brandi Liz RN  Outcome: Ongoing     Problem: Pain:  Goal: Pain level will decrease  Description: Pain level will decrease  3/11/2021 1648 by Liz Rhodes RN  Outcome: Ongoing  3/11/2021 0518 by Brandi Liz RN  Outcome: Ongoing  Goal: Control of acute pain  Description: Control of acute pain  3/11/2021 1648 by Liz Rhodes RN  Outcome: Ongoing  3/11/2021 0518 by Brandi Liz RN  Outcome: Ongoing  Goal: Control of chronic pain  Description: Control of chronic pain  3/11/2021 1648 by Liz Rhodes RN  Outcome: Ongoing  3/11/2021 0518 by Екатерина Barron RN  Outcome: Ongoing     Problem: Nutrition  Goal: Optimal nutrition therapy  3/11/2021 1648 by Issa Fitzpatrick RN  Outcome: Ongoing  3/11/2021 1150 by Bryanna Sharma RD, LD  Outcome: Ongoing  Note: Nutrition Problem #1: Unintended weight loss  Intervention: Food and/or Nutrient Delivery: Continue Current Diet, Start Oral Nutrition Supplement(Chocolate Ensure Enlive TID)  Nutritional Goals: Pt to meet % of estimated nutrition needs.

## 2021-03-12 ENCOUNTER — HOSPITAL ENCOUNTER (OUTPATIENT)
Dept: INFUSION THERAPY | Age: 56
End: 2021-03-12
Payer: COMMERCIAL

## 2021-03-12 VITALS
OXYGEN SATURATION: 94 % | RESPIRATION RATE: 20 BRPM | SYSTOLIC BLOOD PRESSURE: 101 MMHG | BODY MASS INDEX: 29.02 KG/M2 | HEART RATE: 65 BPM | TEMPERATURE: 98.3 F | HEIGHT: 65 IN | DIASTOLIC BLOOD PRESSURE: 54 MMHG | WEIGHT: 174.2 LBS

## 2021-03-12 PROBLEM — I10 ESSENTIAL HYPERTENSION: Status: RESOLVED | Noted: 2021-01-28 | Resolved: 2021-03-12

## 2021-03-12 LAB
ABO/RH: NORMAL
ANION GAP SERPL CALCULATED.3IONS-SCNC: 11 MMOL/L (ref 9–17)
ANTIBODY IDENTIFICATION: NORMAL
ANTIBODY SCREEN: POSITIVE
ARM BAND NUMBER: NORMAL
BLD PROD TYP BPU: NORMAL
BLD PROD TYP BPU: NORMAL
BUN BLDV-MCNC: 7 MG/DL (ref 6–20)
BUN/CREAT BLD: ABNORMAL (ref 9–20)
CALCIUM SERPL-MCNC: 8.5 MG/DL (ref 8.6–10.4)
CHLORIDE BLD-SCNC: 106 MMOL/L (ref 98–107)
CO2: 23 MMOL/L (ref 20–31)
CREAT SERPL-MCNC: 0.32 MG/DL (ref 0.5–0.9)
CROSSMATCH RESULT: NORMAL
CROSSMATCH RESULT: NORMAL
DAT IGG: NEGATIVE
DISPENSE STATUS BLOOD BANK: NORMAL
DISPENSE STATUS BLOOD BANK: NORMAL
EXPIRATION DATE: NORMAL
GFR AFRICAN AMERICAN: >60 ML/MIN
GFR NON-AFRICAN AMERICAN: >60 ML/MIN
GFR SERPL CREATININE-BSD FRML MDRD: ABNORMAL ML/MIN/{1.73_M2}
GFR SERPL CREATININE-BSD FRML MDRD: ABNORMAL ML/MIN/{1.73_M2}
GLUCOSE BLD-MCNC: 103 MG/DL (ref 65–105)
GLUCOSE BLD-MCNC: 159 MG/DL (ref 65–105)
GLUCOSE BLD-MCNC: 168 MG/DL (ref 65–105)
GLUCOSE BLD-MCNC: 97 MG/DL (ref 70–99)
HCT VFR BLD CALC: 26.8 % (ref 36.3–47.1)
HCT VFR BLD CALC: 28.8 % (ref 36.3–47.1)
HEMOGLOBIN: 7.9 G/DL (ref 11.9–15.1)
HEMOGLOBIN: 8.2 G/DL (ref 11.9–15.1)
POTASSIUM SERPL-SCNC: 4 MMOL/L (ref 3.7–5.3)
SODIUM BLD-SCNC: 140 MMOL/L (ref 135–144)
TRANSFUSION STATUS: NORMAL
TRANSFUSION STATUS: NORMAL
UNIT DIVISION: 0
UNIT DIVISION: 0
UNIT NUMBER: NORMAL
UNIT NUMBER: NORMAL

## 2021-03-12 PROCEDURE — 82947 ASSAY GLUCOSE BLOOD QUANT: CPT

## 2021-03-12 PROCEDURE — 85018 HEMOGLOBIN: CPT

## 2021-03-12 PROCEDURE — 85014 HEMATOCRIT: CPT

## 2021-03-12 PROCEDURE — 6370000000 HC RX 637 (ALT 250 FOR IP): Performed by: NURSE PRACTITIONER

## 2021-03-12 PROCEDURE — 36415 COLL VENOUS BLD VENIPUNCTURE: CPT

## 2021-03-12 PROCEDURE — APPSS60 APP SPLIT SHARED TIME 46-60 MINUTES: Performed by: NURSE PRACTITIONER

## 2021-03-12 PROCEDURE — 80048 BASIC METABOLIC PNL TOTAL CA: CPT

## 2021-03-12 PROCEDURE — 99232 SBSQ HOSP IP/OBS MODERATE 35: CPT | Performed by: INTERNAL MEDICINE

## 2021-03-12 PROCEDURE — 99239 HOSP IP/OBS DSCHRG MGMT >30: CPT | Performed by: STUDENT IN AN ORGANIZED HEALTH CARE EDUCATION/TRAINING PROGRAM

## 2021-03-12 RX ORDER — LANOLIN ALCOHOL/MO/W.PET/CERES
325 CREAM (GRAM) TOPICAL
Qty: 30 TABLET | Refills: 0 | Status: ON HOLD | OUTPATIENT
Start: 2021-03-12 | End: 2021-10-31 | Stop reason: HOSPADM

## 2021-03-12 RX ORDER — SENNA PLUS 8.6 MG/1
1 TABLET ORAL NIGHTLY
Qty: 30 TABLET | Refills: 0 | Status: SHIPPED | OUTPATIENT
Start: 2021-03-12 | End: 2021-04-11

## 2021-03-12 RX ADMIN — OXYCODONE HYDROCHLORIDE AND ACETAMINOPHEN 1 TABLET: 5; 325 TABLET ORAL at 04:58

## 2021-03-12 RX ADMIN — APIXABAN 5 MG: 5 TABLET, FILM COATED ORAL at 02:57

## 2021-03-12 RX ADMIN — SERTRALINE 100 MG: 50 TABLET, FILM COATED ORAL at 08:18

## 2021-03-12 RX ADMIN — Medication 1 CAPSULE: at 08:19

## 2021-03-12 RX ADMIN — MORPHINE SULFATE 30 MG: 30 TABLET, EXTENDED RELEASE ORAL at 08:24

## 2021-03-12 RX ADMIN — MORPHINE SULFATE 15 MG: 30 TABLET, EXTENDED RELEASE ORAL at 08:19

## 2021-03-12 RX ADMIN — PANTOPRAZOLE SODIUM 40 MG: 40 TABLET, DELAYED RELEASE ORAL at 08:18

## 2021-03-12 RX ADMIN — OXYCODONE HYDROCHLORIDE AND ACETAMINOPHEN 1 TABLET: 5; 325 TABLET ORAL at 00:04

## 2021-03-12 RX ADMIN — OXYCODONE HYDROCHLORIDE AND ACETAMINOPHEN 1 TABLET: 5; 325 TABLET ORAL at 09:32

## 2021-03-12 RX ADMIN — LEVETIRACETAM 1500 MG: 500 TABLET, FILM COATED ORAL at 08:18

## 2021-03-12 ASSESSMENT — PAIN DESCRIPTION - PROGRESSION
CLINICAL_PROGRESSION: NOT CHANGED

## 2021-03-12 ASSESSMENT — PAIN SCALES - GENERAL
PAINLEVEL_OUTOF10: 6
PAINLEVEL_OUTOF10: 7

## 2021-03-12 NOTE — PROGRESS NOTES
[facility]  Office: [address], DO, Florencia Venegas, DO, Antonia Cervantes, DO, Jeancarlos Braga, DO, Yvon López MD, Piter Coombs MD, Shawna Bustos MD, Luis Be MD, Yue Dorado MD, Marilu Garcia MD, Leia Pierce MD, Naeem Vazquez MD, Mbonu Mable Nyhan, MD, Mely Salas DO, Alida Hills MD, Angel Rubio DO, Lauren Archibald MD,  Landon Evans DO, Aravind March MD, Ev Waterman MD, Sonny Simental, [facility], [address], CNP, Jordan Figueroa, CNP, Ruth Terrazas, CNS, Maddie Mishra, CNP, Baron Miranda, CNP, Latricia Suarez, CNP, Lizbet Dobbs, CNP, Consuelo Aguirre, CNP, Harley Santizo PA-C, Sameera Tesfaye, [facility], Shahab Tristan, CNP, Juliano Daigle, CNP, Em Vazquez, CNP, Serafin Naylor, CNP, Rodrigo Wilson, CNP, Julián Lopes, [address] [place] [address]    Progress Note    3/12/2021    8:30 AM    Name:   Phylicia Gotti  MRN:     [id number]     Acct:      [de-identified]   Room:   [facility]5424-20   Day:  3  Admit Date:  3/9/2021  4:56 PM    PCP:   No primary care provider on file. Code Status:  Full Code    Subjective:     C/C:   Chief Complaint   Patient presents with    Fatigue     c/o weakness with heart pounding, pt states was told at her last chemo Tx hgb was 7, pt also c/o shoulder and back pain     Back Pain     Interval History Status: improved. Afebrile overnight, VSS, on room air  Eliquis restarted for acute DVT treatment  Hemoglobin stable      Brief History:     57-year-old female with past medical history of recurrent ovarian cancer originally diagnosed in 2005 with multiple recurrences and diffuse metastasis presents with fatigue found to be anemic and was transfused with 1 unit of red blood cells. Patient currently also being treated for DVT found on January 26, 2021 with Eliquis as outpatient.   Has significant past medical history of intra-abdominal bleeding due to splenic mass status post coiling and embolization at outlying facility in October 2020. Review of Systems:     Constitutional:  negative for chills, fevers, sweats  Respiratory:  negative for cough, dyspnea on exertion, shortness of breath, wheezing  Cardiovascular:  negative for chest pain, chest pressure/discomfort, lower extremity edema, palpitations  Gastrointestinal:  negative for abdominal pain, constipation, diarrhea, nausea, vomiting  Neurological:  negative for dizziness, headache    Medications: Allergies:     Allergies   Allergen Reactions    Ceftriaxone      Had a rash on ceftriaxone December 2020, [place]       Current Meds:   Scheduled Meds:    senna  1 tablet Oral Nightly    apixaban  5 mg Oral BID    aspirin  81 mg Oral Nightly    lactobacillus  1 capsule Oral Daily    levETIRAcetam  1,500 mg Oral BID    [Held by provider] lisinopril  2.5 mg Oral Daily    metoprolol succinate  25 mg Oral Daily    pantoprazole  40 mg Oral BID    sertraline  100 mg Oral Daily    sodium chloride flush  10 mL Intravenous 2 times per day    atorvastatin  40 mg Oral Nightly    insulin lispro  0-6 Units Subcutaneous TID WC    insulin lispro  0-3 Units Subcutaneous Nightly    morphine  15 mg Oral BID    morphine  30 mg Oral BID     Continuous Infusions:    sodium chloride      sodium chloride      dextrose      sodium chloride 75 mL/hr at 03/10/21 2238     PRN Meds: sodium chloride, sodium chloride, glucose, dextrose, glucagon (rDNA), dextrose, sodium chloride flush, promethazine **OR** ondansetron, acetaminophen **OR** acetaminophen, magnesium hydroxide, potassium chloride **OR** potassium alternative oral replacement **OR** potassium chloride, potassium chloride, magnesium sulfate, nitroGLYCERIN, oxyCODONE-acetaminophen    Data:     Past Medical History:   has a past medical history of Anemia, Bleeding, Cervical cancer ([facility] [address] Depression, Diabetes mellitus ([facility] [address] GERD (gastroesophageal reflux disease), Metastatic cancer [facility]), Ovarian cancer ([address] Post chemo evaluation, and Splenic lesion. Social History:   reports that she has been smoking cigarettes. She has been smoking about 1.00 pack per day. She uses smokeless tobacco. She reports previous alcohol use. She reports that she does not use drugs. Family History:   Family History   Problem Relation Age of Onset    Alcohol Abuse Mother     Cirrhosis Mother        Vitals:  BP (!) 106/51   Pulse 88   Temp 98.7 °F (37.1 °C) (Oral)   Resp 16   Ht 5' 5\" (1.651 m)   Wt 174 lb 3.2 oz (79 kg)   SpO2 95%   BMI 28.99 kg/m²   Temp (24hrs), Av.4 °F (36.9 °C), Min:98.1 °F (36.7 °C), Max:98.7 °F (37.1 °C)    Recent Labs     21  1144 21  1611 21  1955 21  0731   POCGLU 98 148* 159* 103       I/O (24Hr): Intake/Output Summary (Last 24 hours) at 3/12/2021 0830  Last data filed at 3/12/2021 0438  Gross per 24 hour   Intake 1850 ml   Output    Net 1850 ml       Labs:  Hematology:  Recent Labs     21  1743 21  1743 03/10/21  0440 03/10/21  0440 21  1348 21  0118   WBC 9.9  --  7.8  --   --   --   --    RBC 3.19*  --  3.14*  --   --   --   --    HGB 7.2*   < > 7.5*   < > 8.9* 8.1* 7.9*   HCT 24.5*   < > 25.7*   < > 29.2* 25.8* 26.8*   MCV 76.8*  --  81.8*  --   --   --   --    MCH 22.6*  --  23.9*  --   --   --   --    MCHC 29.4  --  29.2  --   --   --   --    RDW 21.1*  --  20.1*  --   --   --   --    *  --  769*  --   --   --   --    MPV 10.0  --  9.9  --   --   --   --     < > = values in this interval not displayed.      Chemistry:  Recent Labs     03/09/21  1743 03/10/21  0218 03/10/21  0440     --  139   K 4.1  --  4.2   CL 96*  --  104   CO2 22  --  23   GLUCOSE 108*  --  75   BUN 11  --  8   CREATININE 0.47*  --  0.40*   MG  --   --  1.9   ANIONGAP 17  --  12   LABGLOM >60  --  >60   GFRAA >60  --  >60   CALCIUM 9.2  --  8.5*   TROPHS 8 10  --      Recent Labs     21 03/10/21  0218 03/10/21  0440 03/10/21  0440 03/10/21  1949 03/11/21  0857 03/11/21  1144 03/11/21  1611 03/11/21 1955 03/12/21  0731   PROT 7.4  --   --  6.4  --   --   --   --   --   --   --    LABALBU 4.0  --   --  3.3*  --   --   --   --   --   --   --    LABA1C  --   --  6.0  --   --   --   --   --   --   --   --    TSH 2.93  --   --   --   --   --   --   --   --   --   --    AST 22  --   --  13  --   --   --   --   --   --   --    ALT 9  --   --  7  --   --   --   --   --   --   --    ALKPHOS 76  --   --  66  --   --   --   --   --   --   --    BILITOT <0.10*  --   --  0.20*  --   --   --   --   --   --   --    CHOL  --   --   --  131  --   --   --   --   --   --   --    HDL  --   --   --  33*  --   --   --   --   --   --   --    LDLCHOLESTEROL  --   --   --  69  --   --   --   --   --   --   --    CHOLHDLRATIO  --   --   --  4.0  --   --   --   --   --   --   --    TRIG  --   --   --  147  --   --   --   --   --   --   --    VLDL  --   --   --  NOT REPORTED  --   --   --   --   --   --   --    POCGLU  --    < >  --   --    < > 144* 128* 98 148* 159* 103    < > = values in this interval not displayed. ABG:  Lab Results   Component Value Date    POCPH 7.428 01/06/2021    POCPCO2 43.0 01/06/2021    POCPO2 98.9 01/06/2021    POCHCO3 28.4 01/06/2021    NBEA NOT REPORTED 01/06/2021    PBEA 4 01/06/2021    GDO2RGJ 30 01/06/2021    AGRG3BQY 98 01/06/2021    FIO2 30.0 01/06/2021     Lab Results   Component Value Date/Time    SPECIAL NOT REPORTED 03/10/2021 07:16 AM     Lab Results   Component Value Date/Time    CULTURE NO SIGNIFICANT GROWTH 03/10/2021 07:16 AM       Radiology:  Ct Head W Wo Contrast    Result Date: 3/9/2021  No acute intracranial abnormality. Ct Cervical Spine Wo Contrast    Result Date: 3/9/2021  1. No acute fracture. 2. Straightening of the normal cervical lordosis, which can be positional or related to muscle spasm.  3. Small lucent lesions within multiple cervical vertebral bodies are indeterminate. MRI with and without contrast may be useful further characterization. Xr Chest Portable    Result Date: 3/9/2021  Vague density retrocardiac space of uncertain etiology. No evidence of pneumothorax. Ct Chest Abdomen Pelvis W Contrast    Result Date: 3/9/2021  Interval increase in number of subcentimeter pulmonary nodules throughout the lungs bilaterally compatible with progressive metastatic disease. Resolving left lower lobe infiltrate and left pleural effusion. Small right pleural effusion Stable moderate pericardial effusion. . Stable mediastinal, hilar, mesenteric, retroperitoneal, pelvic and left inguinal lymphadenopathy compatible with metastatic disease. Stable osteoblastic lesions in the spine. Mildly improved left lower lobe infiltrate and left pleural effusion Stable pleural/extrapleural mass along the anterior right chest wall near the manubriosternal junction Mild ascites Evidence of constipation Stable pigtail catheter in the complex splenic mass. No other significant changes are seen compared to the previous exams     Ct Lumbar Spine Trauma Reconstruction    Result Date: 3/9/2021  Stable appearance CT lumbar spine when compared to prior study     Ct Thoracic Spine Trauma Reconstruction    Result Date: 3/9/2021  Unremarkable CT of the thoracic spine.        Physical Examination:        General appearance:  alert, cooperative and no distress thin limbs, chronically ill-appearing  Mental Status:  oriented to person, place and time and normal affect  Lungs:  clear to auscultation bilaterally, normal effort  Heart:  regular rate and rhythm, no murmur  Abdomen:  soft, nontender, nondistended, normal bowel sounds, no masses, hepatomegaly, splenomegaly  Extremities: Trace left lower extremity nonpitting edema, redness, tenderness in the calves  Skin:  no gross lesions, rashes, induration, port appreciated without access    Assessment:        Hospital Problems           Last Modified POA * (Principal) Anemia 3/9/2021 Yes    Malignant neoplasm of ovary ([facility] [address] 3/9/2021 Yes    Seizure ([facility] [address] 3/9/2021 Yes    Type 2 diabetes mellitus without complication, without long-term current use of insulin ([facility] [address] 3/9/2021 Yes    Cancer, metastatic to bone ([facility] [address] 3/9/2021 Yes    Essential hypertension 3/9/2021 Yes    Fatigue 3/10/2021 Yes    Acute deep vein thrombosis (DVT) of femoral vein of left lower extremity ([facility] [address] 3/11/2021 Yes          Plan:        1. Anemia:   -Hemoglobin low but stable.   -Eliquis restarted  -Weekly H&H as outpatient x1 month    2. Acute left lower extremity DVT: External iliac and common femoral veins  - unable to fully anticoagulate secondary to #1.    -Vascular surgery consulted for IVC filter placement, patient not a candidate at this time due to high risk.  -Eliquis restarted on 3/11    3. Ovarian cancer metastatic spread to bone:   -Heme-onc following. 4. Essential hypertension: Currently stable. - Continue metoprolol, lisinopril on hold-we'll discontinue at discharge    5. Diabetes mellitus type 2: A.m. blood sugar stable    6. Seizure: Without breakthrough seizure.    -Continue Keppra twice daily    7.  GI/DVT prophylaxis: Protonix, start Eliquis    ESTRELLITA Vilchis NP  3/12/2021  8:30 AM

## 2021-03-12 NOTE — PROGRESS NOTES
PROGRESS NOTE                   Date:                           3/12/2021  Patient name:           Nikki Bryant  Date of admission:  3/9/2021  4:56 PM  MRN:   [id number]  YOB: 1965  PCP:                           Berta Kennedy MD          Subjective:       Patient seen and examined  No specific complaints at this time  Received blood transfusion  Febrile this morning, patient was reporting feeling hot, denies it being related temporally to transfusion time  Denies any productive cough, sputum, urinary symptoms      INITIAL HISTORY    The patient is a 60 y.o.  female who is admitted to the hospital for anemia. PMH metastatic ovarian CA, DM, GERD, anemia from chemotherapy presented for concerns of weakness and malaise for past 4 days. She additionally has L shoulder pain radiating to back, decreased appetitie and nausea without vomiting or abd pain. Hgb was found to be 7.2 although was 7 after last chemotherapy treatment on 2/26/2021. CT head showed no acute intracranial abnormality. CT spine showed small lucent lesions within multiple cervical vertebral bodies. CT chest abdomen shows increase in number of subcentimeter pulmonary nodules throughout lung bilaterally compativile with progressive metastatic disease, resolving LLL infiltrate/effusion, but stable pericardial effusion, stable lymphadenopathy, stable osteoblastic lesions.   Oncology consulted for management of anemia.     Patient has history of recurrent ovarian cancer originally diagnosed in the year 2005 with multiple recurrences. David Roper has been treated with Doxil Avastin. Finas Habermann was discontinued due to intra-abdominal bleeding.  Patient also has history of seizure disorder.  Work-up did not reveal any brain metastasis.  Patient also recently had heart catheterization which did not show any critical stenosis.  Patient was thought to be a candidate for anthracycline.         Problem Lists:   Primary Problem: wheezes, rales or rhonchi, symmetric air entry   Heart - normal rate, regular rhythm, normal S1, S2, no murmurs, rubs, clicks or gallops   Abdomen - soft, nontender, nondistended, no masses or organomegaly   Neurological - alert, oriented, normal speech, no focal findings or movement disorder noted   Musculoskeletal - no joint tenderness, deformity or swelling   Extremities - peripheral pulses normal, no pedal edema, no clubbing or cyanosis   Skin - normal coloration and turgor, no rashes, no suspicious skin lesions noted ,     Data:    No intake/output data recorded. In: 1504 [P.O.:750; I.V.:1100]  Out: -     CBC:   Recent Labs     03/10/21  0440 03/10/21  0440 03/11/21  2002 03/12/21  0118 03/12/21  0830   WBC 7.8  --   --   --   --    HGB 7.5*   < > 8.1* 7.9* 8.2*   *  --   --   --   --     < > = values in this interval not displayed. BMP:    Recent Labs     03/10/21  0440 03/12/21  0830    140   K 4.2 4.0    106   CO2 23 23   BUN 8 7   CREATININE 0.40* 0.32*   GLUCOSE 75 97     Hepatic:   Recent Labs     03/10/21  0440   AST 13   ALT 7   BILITOT 0.20*   ALKPHOS 66     INR: No results for input(s): INR in the last 72 hours. PTT:No results for input(s): PTT in the last 72 hours. Labs:  General Labs:  No results for input(s): LABIRON, TIBC, FERRITIN, [id number], FOLATE, OCCULTBLD in the last 72 hours. Interval increase in number of subcentimeter pulmonary nodules throughout the   lungs bilaterally compatible with progressive metastatic disease. Resolving   left lower lobe infiltrate and left pleural effusion. Small right pleural   effusion       Stable moderate pericardial effusion. .       Stable mediastinal, hilar, mesenteric, retroperitoneal, pelvic and left   inguinal lymphadenopathy compatible with metastatic disease.       Stable osteoblastic lesions in the spine.       Mildly improved left lower lobe infiltrate and left pleural effusion       Stable pleural/extrapleural mass along the anterior right chest wall near the   manubriosternal junction       Mild ascites       Evidence of constipation       Stable pigtail catheter in the complex splenic mass.       No other significant changes are seen compared to the previous exams          Assessment    1. Malignant ovarian cancer with metastasis to bone:  recurrent ovarian cancer originally diagnosed in the year 2005 with multiple recurrences. Rhoda Lipoma has been treated with Doxil and Avastin. [place] Prudent was discontinued due to intra-abdominal bleeding. Currently on gemcitabine treatment which has fewer side effects but does result in anemia  2. Metastatic disease to bone and lung: New lesions noted in lung, however no effect on breathing, other lesions appear stable at this time  3. Anemia: From gemcitabine, stable, no need for additional treatment at this time  4. DVT: On left, placed on Eliquis outpatient, unlikely cause of anemia at this time        Plan     1. Given stability of hemoglobin, no active bleed, symptoms minimal and mostly resolved, unlikely problem at this time, anemia is chronic and likely result of gemcitabine, will continue treatment as outpatient  2. From our standpoint okay to continue anticoagulation at this time                                    [facility]                            This note is created with the assistance of a speech recognition program.  While intending to generate a document that actually reflects the content of the visit, the document can still have some errors including those of syntax and sound a like substitutions which may escape proof reading. It such instances, actual meaning can be extrapolated by contextual diversion.

## 2021-03-12 NOTE — PROGRESS NOTES
Vascular Surgery Progress Note            PATIENT NAME: Luca Bonds [facility]     TODAY'S DATE: 3/12/2021, 5:34 AM    SUBJECTIVE:    Pt seen and examined. No new concerns or complaints today. Vitals stable. Afebrile. No blood products received overnight. Most recent hemoglobin 7.9. Denies chest pain, shortness of breath, and lower extremity pain. OBJECTIVE:   Vitals:  BP (!) 106/51   Pulse 88   Temp 98.7 °F (37.1 °C) (Oral)   Resp 16   Ht 5' 5\" (1.651 m)   Wt 174 lb 3.2 oz (79 kg)   SpO2 95%   BMI 28.99 kg/m²      INTAKE/OUTPUT:      Intake/Output Summary (Last 24 hours) at 3/12/2021 0534  Last data filed at 3/12/2021 5908  Gross per 24 hour   Intake 3230 ml   Output 953 ml   Net 2277 ml                 General: AOx3, NAD  Lungs: Symmetrical chest expansion, no audible wheezing  Heart: Regular rate   Abdomen: Non-distended  Extremity: moves all extremities x4 without pain, LLE swelling, mild. Palpable DP and PT pulses bilaterally. Toes warm and well perfused.       Data:  CBC:   Lab Results   Component Value Date    WBC 7.8 03/10/2021    RBC 3.14 03/10/2021    HGB 7.9 03/12/2021    HCT 26.8 03/12/2021    MCV 81.8 03/10/2021    MCH 23.9 03/10/2021    MCHC 29.2 03/10/2021    RDW 20.1 03/10/2021     03/10/2021    MPV 9.9 03/10/2021     CBC with Differential:    Lab Results   Component Value Date    WBC 7.8 03/10/2021    RBC 3.14 03/10/2021    HGB 7.9 03/12/2021    HCT 26.8 03/12/2021     03/10/2021    MCV 81.8 03/10/2021    MCH 23.9 03/10/2021    MCHC 29.2 03/10/2021    RDW 20.1 03/10/2021    NRBC 1 03/09/2021    LYMPHOPCT 14 03/09/2021    MONOPCT 15 03/09/2021    BASOPCT 0 03/09/2021    MONOSABS 1.49 03/09/2021    LYMPHSABS 1.39 03/09/2021    EOSABS 0.10 03/09/2021    BASOSABS 0.00 03/09/2021    DIFFTYPE NOT REPORTED 03/09/2021     WBC:    Lab Results   Component Value Date    WBC 7.8 03/10/2021     CMP:    Lab Results   Component Value Date     03/10/2021    K 4.2 03/10/2021  03/10/2021    CO2 23 03/10/2021    BUN 8 03/10/2021    CREATININE 0.40 03/10/2021    GFRAA >60 03/10/2021    LABGLOM >60 03/10/2021    GLUCOSE 75 03/10/2021    PROT 6.4 03/10/2021    LABALBU 3.3 03/10/2021    CALCIUM 8.5 03/10/2021    BILITOT 0.20 03/10/2021    ALKPHOS 66 03/10/2021    AST 13 03/10/2021    ALT 7 03/10/2021     BMP:    Lab Results   Component Value Date     03/10/2021    K 4.2 03/10/2021     03/10/2021    CO2 23 03/10/2021    BUN 8 03/10/2021    LABALBU 3.3 03/10/2021    CREATININE 0.40 03/10/2021    CALCIUM 8.5 03/10/2021    GFRAA >60 03/10/2021    LABGLOM >60 03/10/2021    GLUCOSE 75 03/10/2021     Calcium:    Lab Results   Component Value Date    CALCIUM 8.5 03/10/2021         ASSESSMENT   Patient Active Problem List   Diagnosis    Malignant neoplasm of ovary ([address]    Seizure ([address]    Type 2 diabetes mellitus without complication, without long-term current use of insulin (HCC)    Anemia    Splenic abscess    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 Left lower extremity DVT     PLAN    1. No vascular surgery intervention at this time given high risk for surgery   2. Ideally should be on anticoagulation once medically stable to receive   3. Will continue to monitor  4.  Please contact vascular resident with any questions or concerns         Electronically signed by Carol Tate DO  on 3/12/2021 at 5:34 AM      General Surgery Resident Statement/Note:  I have discussed the case, including pertinent history and exam findings with the above resident and have personally seen the patient.      Pt seen and examined at bedside.  I performed both history and physical exam. Stable. No complaints this AM. Hemo Onc OK with restarting AC.  No plan for filter placement at this time.      Ruiz Ramirez MD  General Surgery PGY3

## 2021-03-12 NOTE — CARE COORDINATION
Informed that patient is requesting skilled home care.     In the past she has utilized [facility], and is willing to resume care if the RN that came to her home in December/Jan.    Call placed to Saad at [facility] with the patient's request.    Guerrero Campoverde face sheet to [facility] [phone]

## 2021-03-12 NOTE — DISCHARGE INSTR - COC
Continuity of Care Form    Patient Name: Rosa Maria Thompson   :  1965  MRN:  [id number]    Admit date:  3/9/2021  Discharge date:  3/12/2021    Code Status Order: Full Code   Advance Directives:   [facility] Documentation       Date/Time Healthcare Directive Type of Healthcare Directive Copy in [address] [place] [address] [address] Agent's Name Healthcare Agent's Phone Number    03/10/21 0050  No, patient does not have an advance directive for healthcare treatment -- -- -- -- --            Admitting Physician:  Elida Cheatham MD  PCP: No primary care provider on file. Suzi Fowler MD PCP - General Oncology 2021 End  3/12/21   Phone: [phone]            Discharging Nurse: [facility] Unit/Room#: 7612/1768-77  Discharging Unit Phone Number: [phone]    Emergency Contact:   Extended Emergency Contact Information  Primary Emergency Contact: Dmitriy Faith hipaa  Address: n/a  Home Phone: [phone]  Work Phone: [phone]  Mobile Phone: [phone]  Relation: Child  Secondary Emergency Contact: Yovani Garcia (son hipaa)  Address: [address] [place] [address]  Home Phone: [phone]  Relation: Child    Past Surgical History:  Past Surgical History:   Procedure Laterality Date    ABSCESS DRAINAGE      Franca rectal    ANUS SURGERY      ANAL FISSURECTOMY    COLECTOMY  2013    ex lap, tumor debulking, transverse colectomy w reanastamosis, subgastric omentectomy, intraperitoneal port placement    HYSTERECTOMY, TOTAL ABDOMINAL      IR EMBOLIZATION HEMORRHAGE  10/05/2020    intra-abdominal bleeding -due to splenic mass with GI infiltration. Status post embolization [employer] interlock coils x7. mri condtional 3t ok, safe immediately post implant.     IR PORT PLACEMENT EQUAL OR GREATER THAN 5 YEARS  2020    IR PORT PLACEMENT EQUAL OR GREATER THAN 5 YEARS 2020 [facility] SPECIAL PROCEDURES    PORT SURGERY IP Port    TONSILLECTOMY         Immunization History:   Immunization History   Administered Date(s) Administered    Influenza, Quadv, IM, PF (6 mo and older Fluzone, Flulaval, Fluarix, and 3 yrs and older Afluria) 10/21/2020    Pneumococcal Conjugate 13-valent (Seda Orellana) 10/21/2020    Pneumococcal Polysaccharide ([id number]) 12/01/2010, 12/11/2020       Active Problems:  Patient Active Problem List   Diagnosis Code    Malignant neoplasm of [facility] ([facility] C56.9    Seizure ([address] R56.9    Type 2 diabetes mellitus without complication, without long-term current use of insulin ([address] E11.9    Anemia D64.9    Splenic abscess D73.3    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 ([address] C79.60    Acute encephalopathy G93.40    PRES (posterior reversible encephalopathy syndrome) I67.83    Hemianopia, bitemporal H53.47    Encephalopathy G93.40    Status epilepticus ([facility] G40.901    History of malignant neoplasm of ovary Z85.43    Pleural effusion J90    Bandemia D72.825    Cancer, metastatic to bone (HCC) C79.51    Intractable low back pain M54.5    Fatigue R53.83    Acute deep vein thrombosis (DVT) of femoral vein of left lower extremity (HCC) I82.412       Isolation/Infection:   Isolation            No Isolation          Patient Infection Status       Infection Onset Added Last Indicated Last Indicated By Review Planned Expiration Resolved Resolved By    None active    Resolved    COVID-19 Rule Out 08/20/20 08/20/20 08/21/20 Covid-19 Ambulatory (Ordered)   08/22/20 Rule-Out Test Resulted            Nurse Assessment:  Last Vital Signs: BP (!) 101/54   Pulse 65   Temp 98.3 °F (36.8 °C) (Oral)   Resp 20   Ht 5' 5\" (1.651 m)   Wt 174 lb 3.2 oz (79 kg)   SpO2 94%   BMI 28.99 kg/m²     Last documented pain score (0-10 scale): Pain Level: 7  Last Weight:   Wt Readings from Last 1 Encounters:   03/11/21 174 lb 3.2 oz (79 kg)     Mental Status:  oriented, alert, coherent and logical    IV Access:  - None Nursing Mobility/ADLs:  Walking   Independent  Transfer  Independent  Bathing  Independent  Dressing  Independent  Toileting  Independent  Feeding  Independent  Med [address] [place]  Med Delivery   whole    Wound Care Documentation and Therapy:  Puncture 01/12/21 Wrist Anterior;Right (Active)   Number of days: 58        Elimination:  Continence:   · Bowel: Yes  · Bladder: Yes  Urinary Catheter: None   Colostomy/Ileostomy/Ileal Conduit: No       Date of Last BM: unknown    Intake/Output Summary (Last 24 hours) at 3/12/2021 1129  Last data filed at 3/12/2021 0438  Gross per 24 hour   Intake 1850 ml   Output    Net 1850 ml     I/O last 3 completed shifts: In: 1045 [P.O.:750; I.V.:1100]  Out: -     Safety Concerns:     None    Impairments/Disabilities:      None    Nutrition Therapy:  Current Nutrition Therapy:   - Oral Diet:  General    Routes of Feeding: Oral  Liquids: No Restrictions  Daily Fluid Restriction: no  Last Modified Barium Swallow with Video (Video Swallowing Test): not done    Treatments at the Time of Hospital Discharge:   Respiratory Treatments: none  Oxygen Therapy:  is not on home oxygen therapy. Ventilator:    - No ventilator support    Rehab Therapies: none  Weight Bearing Status/Restrictions: No weight bearing restirctions  Other Medical Equipment (for information only, NOT a DME order):  none  Other Treatments: medication compliance and emotional support.     Patient's personal belongings (please select all that are sent with patient):  None    RN SIGNATURE:  Electronically signed by Jessica Benitez on 3/12/21 at 11:53 AM EST    CASE MANAGEMENT/SOCIAL WORK SECTION    Inpatient Status Date: ***    Readmission Risk Assessment Score:  Readmission Risk              Risk of Unplanned Readmission:        38           Discharging to Facility/ Agency   Name:   [facility] Care Details  [address] [place] [address] [address] [id number]       Phone: [phone]       Fax: [phone]        ·   · Address:  · Phone:  · Fax:    Dialysis Facility (if applicable)   · Name:  · Address:  · Dialysis Schedule:  · Phone:  · Fax:    / signature: Electronically signed by Edin Peñaloza RN on 3/12/21 at 2:05 PM EST    PHYSICIAN SECTION    Prognosis: Guarded    Condition at Discharge: Stable    Rehab Potential (if transferring to Rehab): Guarded    Recommended Labs or Other Treatments After Discharge: PT, OT, nursing evaluation and treatment, on eliquis for DVT, monitor H/H weekly. Follow up PCP, follow up oncology, follow up pulm as outpateint. Physician Certification: I certify the above information and transfer of Hugo Hernández  is necessary for the continuing treatment of the diagnosis listed and that she requires Home Care for less 30 days.      Update Admission H&P: No change in H&P    PHYSICIAN SIGNATURE:  Electronically signed by Faviola Castanon MD on 3/12/21 at 11:30 AM EST

## 2021-03-12 NOTE — PLAN OF CARE
Problem:  Activity:  Goal: Fatigue will decrease  Description: Fatigue will decrease  3/12/2021 0433 by Karthikeyan Maguire RN  Outcome: Ongoing  3/11/2021 1648 by Ashly Singletary RN  Outcome: Ongoing  Goal: Ability to tolerate increased activity will improve  Description: Ability to tolerate increased activity will improve  3/12/2021 0433 by Karthikeyan Maguire RN  Outcome: Ongoing  3/11/2021 1648 by Ashly Singletary RN  Outcome: Ongoing  Goal: Ability to maintain optimal joint mobility will improve  Description: Ability to maintain optimal joint mobility will improve  3/12/2021 0433 by Karthikeyan Maguire RN  Outcome: Ongoing  3/11/2021 1648 by Ashly Singletary RN  Outcome: Ongoing

## 2021-03-12 NOTE — DISCHARGE SUMMARY
LABGLOM >60 03/12/2021    GFRAA >60 03/12/2021    GFR      03/12/2021    GFR NOT REPORTED 03/12/2021     HFP:    Lab Results   Component Value Date    PROT 6.4 03/10/2021     CMP:    Lab Results   Component Value Date    GLUCOSE 97 03/12/2021     03/12/2021    K 4.0 03/12/2021     03/12/2021    CO2 23 03/12/2021    BUN 7 03/12/2021    CREATININE 0.32 03/12/2021    ANIONGAP 11 03/12/2021    ALKPHOS 66 03/10/2021    ALT 7 03/10/2021    AST 13 03/10/2021    BILITOT 0.20 03/10/2021    LABALBU 3.3 03/10/2021    ALBUMIN 1.1 03/10/2021    LABGLOM >60 03/12/2021    GFRAA >60 03/12/2021    GFR      03/12/2021    GFR NOT REPORTED 03/12/2021    PROT 6.4 03/10/2021    CALCIUM 8.5 03/12/2021     PT/INR:    Lab Results   Component Value Date    PROTIME 11.0 10/23/2020    INR 1.0 10/23/2020     PTT:   Lab Results   Component Value Date    APTT 25.5 01/07/2021     FLP:    Lab Results   Component Value Date    CHOL 131 03/10/2021    TRIG 147 03/10/2021    HDL 33 03/10/2021     U/A:    Lab Results   Component Value Date    COLORU YELLOW 03/10/2021    TURBIDITY CLEAR 03/10/2021    SPECGRAV 1.024 03/10/2021    HGBUR NEGATIVE 03/10/2021    PHUR 6.0 03/10/2021    PROTEINU NEGATIVE 03/10/2021    GLUCOSEU NEGATIVE 03/10/2021    KETUA NEGATIVE 03/10/2021    BILIRUBINUR NEGATIVE 03/10/2021    UROBILINOGEN Normal 03/10/2021    NITRU NEGATIVE 03/10/2021    LEUKOCYTESUR NEGATIVE 03/10/2021     TSH:    Lab Results   Component Value Date    TSH 2.93 03/09/2021        Radiology:  Ct Head W Wo Contrast    Result Date: 3/9/2021  No acute intracranial abnormality. Ct Cervical Spine Wo Contrast    Result Date: 3/9/2021  1. No acute fracture. 2. Straightening of the normal cervical lordosis, which can be positional or related to muscle spasm. 3. Small lucent lesions within multiple cervical vertebral bodies are indeterminate. MRI with and without contrast may be useful further characterization.      Xr Chest Portable    Result Date: 3/9/2021  Vague density retrocardiac space of uncertain etiology. No evidence of pneumothorax. Ct Chest Abdomen Pelvis W Contrast    Result Date: 3/9/2021  Interval increase in number of subcentimeter pulmonary nodules throughout the lungs bilaterally compatible with progressive metastatic disease. Resolving left lower lobe infiltrate and left pleural effusion. Small right pleural effusion Stable moderate pericardial effusion. . Stable mediastinal, hilar, mesenteric, retroperitoneal, pelvic and left inguinal lymphadenopathy compatible with metastatic disease. Stable osteoblastic lesions in the spine. Mildly improved left lower lobe infiltrate and left pleural effusion Stable pleural/extrapleural mass along the anterior right chest wall near the manubriosternal junction Mild ascites Evidence of constipation Stable pigtail catheter in the complex splenic mass. No other significant changes are seen compared to the previous exams     Ct Lumbar Spine Trauma Reconstruction    Result Date: 3/9/2021  Stable appearance CT lumbar spine when compared to prior study     Ct Thoracic Spine Trauma Reconstruction    Result Date: 3/9/2021  Unremarkable CT of the thoracic spine. Consultations:    Consults:     Final Specialist Recommendations/Findings:   IP CONSULT TO HOSPITALIST  IP CONSULT TO ONCOLOGY  IP CONSULT TO DIETITIAN  IP CONSULT TO VASCULAR SURGERY  IP CONSULT TO IV TEAM  IP CONSULT TO HOME CARE NEEDS      The patient was seen and examined on day of discharge and this discharge summary is in conjunction with any daily progress note from day of discharge.     Discharge plan:     Disposition: Home    Physician Follow Up:     Sandra Tong MD  [facility] [phone]    Call in 1 week  for follow up after hospitalization    Brendan Sotelo MD  [facility]  [address] [place] [address]  [phone]            Please follow up with your Primary care physician Requiring Further Evaluation/Follow Up POST HOSPITALIZATION/Incidental Findings: follow up lab work weekly as outpatient. If you need transfusion call heme/onc office to set up outpatient transfusion    Diet: regular diet    Activity: As tolerated    Instructions to Patient: see attached     Discharge Medications:      Medication List      START taking these medications    apixaban 5 MG Tabs tablet  Commonly known as: ELIQUIS  Take 1 tablet by mouth 2 times daily     ferrous sulfate 325 (65 Fe) MG EC tablet  Commonly known as: FE TABS 325  Take 1 tablet by mouth daily (with breakfast)     senna 8.6 MG tablet  Commonly known as: SENOKOT  Take 1 tablet by mouth nightly        CONTINUE taking these medications    aspirin 81 MG chewable tablet     lactobacillus capsule  Take 1 capsule by mouth daily     levETIRAcetam 750 MG tablet  Commonly known as: KEPPRA  Take 2 tablets by mouth 2 times daily     metFORMIN 500 MG extended release tablet  Commonly known as: GLUCOPHAGE-XR  Take 2 tablets by mouth twice daily     metoprolol succinate 25 MG extended release tablet  Commonly known as: TOPROL XL  Take 1 tablet by mouth daily     * morphine 15 MG extended release tablet  Commonly known as: MS CONTIN  TAKE 1 TABLET BY MOUTH 2 TIMES DAILY FOR 30 DAYS. * morphine 30 MG extended release tablet  Commonly known as: MS CONTIN  TAKE 1 TABLET BY MOUTH 2 TIMES DAILY FOR 30 DAYS. oxyCODONE-acetaminophen 5-325 MG per tablet  Commonly known as: PERCOCET  TAKE 1 TABLET BY MOUTH EVERY 4 HOURS AS NEEDED FOR PAIN (BREAKTHROUGH PAIN) FOR UP TO 30 DAYS. Protonix 40 MG tablet  Generic drug: pantoprazole     sertraline 100 MG tablet  Commonly known as: ZOLOFT  Take 1 tablet by mouth daily         * This list has 2 medication(s) that are the same as other medications prescribed for you. Read the directions carefully, and ask your doctor or other care provider to review them with you.             STOP taking these medications lisinopril 2.5 MG tablet  Commonly known as: PRINIVIL;ZESTRIL           Where to Get Your Medications      These medications were sent to [facility] [address] [facility], [address] [address]    Phone: [phone]   · apixaban 5 MG Tabs tablet  · ferrous sulfate 325 (65 Fe) MG EC tablet  · senna 8.6 MG tablet         Discharge Procedure Orders   Hemoglobin And Hematocrit, Blood   Standing Status: Future Standing Exp. Date: 03/12/22   Order Comments: Weekly for 1 month  Send results to Dr. Martin Wenceslao on discharge is  33 mins in patient examination, evaluation, counseling as well as medication reconciliation, prescriptions for required medications, discharge plan and follow up. Discussed with attending   Electronically signed by   ESTRELLITA Vilchis NP  3/12/2021  1:46 PM      Thank you Dr. Ramos primary care provider on file. for the opportunity to be involved in this patient's care.

## 2021-03-15 ENCOUNTER — OFFICE VISIT (OUTPATIENT)
Dept: ONCOLOGY | Age: 56
End: 2021-03-15
Payer: COMMERCIAL

## 2021-03-15 ENCOUNTER — TELEPHONE (OUTPATIENT)
Dept: ONCOLOGY | Age: 56
End: 2021-03-15

## 2021-03-15 VITALS
BODY MASS INDEX: 26.43 KG/M2 | TEMPERATURE: 98.5 F | RESPIRATION RATE: 16 BRPM | DIASTOLIC BLOOD PRESSURE: 72 MMHG | SYSTOLIC BLOOD PRESSURE: 134 MMHG | WEIGHT: 158.8 LBS | HEART RATE: 88 BPM

## 2021-03-15 DIAGNOSIS — D64.9 ANEMIA, UNSPECIFIED TYPE: ICD-10-CM

## 2021-03-15 DIAGNOSIS — C56.9 MALIGNANT NEOPLASM OF OVARY, UNSPECIFIED LATERALITY (HCC): Primary | ICD-10-CM

## 2021-03-15 DIAGNOSIS — C79.60 MALIGNANT NEOPLASM METASTATIC TO OVARY, UNSPECIFIED LATERALITY (HCC): ICD-10-CM

## 2021-03-15 PROCEDURE — 99214 OFFICE O/P EST MOD 30 MIN: CPT | Performed by: INTERNAL MEDICINE

## 2021-03-15 PROCEDURE — 99211 OFF/OP EST MAY X REQ PHY/QHP: CPT | Performed by: INTERNAL MEDICINE

## 2021-03-15 NOTE — PATIENT INSTRUCTIONS
Proceed with chemotherapy on Friday as ordered after checking labs. RV 1 week after treatment.  MD visit and chemo

## 2021-03-15 NOTE — TELEPHONE ENCOUNTER
AVS from 3/15/21     Proceed with chemotherapy on Friday as ordered after checking labs. RV 1 week after treatment.  MD visit and chemo    C2D1 scheduled Friday per AVS    RV scheduled 4/9/21 @ 9am w/ tx to follow    PT was given AVS and an appt schedule    Electronically signed by Abdirizak Coto on 3/15/2021 at 12:46 PM

## 2021-03-15 NOTE — PROGRESS NOTES
_      Chief Complaint   Patient presents with    Follow-up     review status of disease    Follow-Up from Hospital    Fatigue    Pain     back     DIAGNOSIS:       Recurrent ovarian cancer. Original diagnosis 2005 with multiple recurrences. Diffuse metastasis. CURRENT THERAPY:         Doxil/ Avastin started 9/18/2020. Avastin was discontinued due to recent GI bleeding. Status post recent vascular embolization  S/p seizure disorder. Gemzar started 2/26/2021. Interrupted due to recent hospitalization    BRIEF CASE HISTORY:      Ms. Avtar Clinton is a very pleasant 60 y.o. female with history of multiple co morbidities as listed. The patient seen in consultation for ovarian cancer with multiple recurrences. 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 She had surgical debulking and she had systemic treatment with Taxol and carboplatin for 6 cycles. Patient was in remission for about 2 years. She had a relapse in 2007 and treated with topotecan with good results. Patient relapsed again in 2008 and was treated with Taxol carboplatin. After 3 cycles of systemic chemotherapy she had problems with carboplatin so she finished 3 more cycles with Taxol. She did well again for 2 to 3 years until she had a relapse in 2012 and she had intraperitoneal chemotherapy treatment with Taxol. Patient was last seen by her oncologist in 2014 at which time she had relatively stable disease with normal tumor marker and no significant abnormal images. Patient moved to [place] after that and she was lost for oncology follow-ups. Patient was recently evaluated again here in [facility] because of abdominal discomfort. Re imaging confirmed metastatic relapse. Biopsy confirmed ovarian cancer recurrence. Scan showed extensive intraabdominal and splenic involvement and lung mets.  She has no symptoms at the present time. Patient denies smoking or alcohol drinking.l    INTERIM HISTORY:    patient is seen for follow up recurrent ovarian cancer. Patient received 1 cycle of palliative chemotherapy using Doxil/ Avastin. Patient was hospitalized at [facility] last week because of severe GI bleeding. She had multiple blood transfusions. She had GI and vascular evaluation. She had evidence of intra-abdominal mass at the splenic area with GI infiltration. Patient had embolization by vascular. Recently readmitted because of a seizure disorder. Images showed no brain mets. Currently seizures are controlled. Patient was hospitalized last week due to left leg DVT and anemia. She was treated accordingly. She received Eliquis. Tolerated well. Currently she feels better. No clear active bleeding. No melena or hematochezia. No hematemesis. Switched to Gemzar      PAST MEDICAL HISTORY: has a past medical history of Anemia, Bleeding, Cervical cancer ([facility] [address] Depression, Diabetes mellitus ([facility] [address] GERD (gastroesophageal reflux disease), Metastatic cancer ([facility] [address] Ovarian cancer ([facility] [address] Post chemo evaluation, and Splenic lesion. PAST SURGICAL HISTORY: has a past surgical history that includes Hysterectomy, total abdominal; Port Surgery; Tonsillectomy; IR PORT PLACEMENT > 5 YEARS (8/24/2020); Anus surgery; Abscess Drainage (2013); colectomy (03/2013); and IR EMBOLIZATION HEMORRHAGE (10/05/2020). CURRENT MEDICATIONS:  has a current medication list which includes the following prescription(s): apixaban, senna, ferrous sulfate, oxycodone-acetaminophen, morphine, morphine, metformin, lactobacillus, sertraline, metoprolol succinate, levetiracetam, pantoprazole, and aspirin. ALLERGIES:  is allergic to ceftriaxone. FAMILY HISTORY: Negative for any hematological or oncological conditions. SOCIAL HISTORY:  reports that she has been smoking cigarettes.  She has been smoking about 1.00 pack normal  Nose - normal and patent, no erythema, discharge or polyps  Mouth - mucous membranes moist, pharynx normal without lesions  Neck - supple, no significant adenopathy  Lymphatics - no palpable lymphadenopathy, no hepatosplenomegaly  Chest - clear to auscultation, no wheezes, rales or rhonchi, symmetric air entry  Heart - normal rate, regular rhythm, normal S1, S2, no murmurs, rubs, clicks or gallops  Abdomen - soft, nontender, nondistended, no masses or organomegaly  Neurological - alert, oriented, normal speech, no focal findings or movement disorder noted  Musculoskeletal - no joint tenderness, deformity or swelling  Extremities - peripheral pulses normal, no pedal edema, no clubbing or cyanosis  Skin - normal coloration and turgor, no rashes, no suspicious skin lesions noted     Review of Diagnostic data:   Lab Results   Component Value Date    WBC 7.8 03/10/2021    HGB 8.2 (L) 03/12/2021    HCT 28.8 (L) 03/12/2021    MCV 81.8 (L) 03/10/2021     (H) 03/10/2021       Chemistry        Component Value Date/Time     03/12/2021 0830    K 4.0 03/12/2021 0830     03/12/2021 0830    CO2 23 03/12/2021 0830    BUN 7 03/12/2021 0830    CREATININE 0.32 (L) 03/12/2021 0830        Component Value Date/Time    CALCIUM 8.5 (L) 03/12/2021 0830    ALKPHOS 66 03/10/2021 0440    AST 13 03/10/2021 0440    ALT 7 03/10/2021 0440    BILITOT 0.20 (L) 03/10/2021 0440          Lab Results   Component Value Date     409 (H) 01/05/2021         IMPRESSION:   Recurrent ovarian cancer. Original diagnosis 2005 with multiple recurrences. Diffuse metastasis. Recent active intra-abdominal bleeding due to splenic mass with GI infiltration. Status post embolization  S/p seizure disorder. PLAN:   Discussed palliative treatment. Different regimens were discussed. We prescribed Doxil as single agent but insurance declined. Patient was started on Doxil and Avastin. She tolerated treatment fairly well.   However due to recent episode of intra-abdominal bleeding we will discontinue Avastin. Next treatment will be with the use of Doxil. Patient had embolization by vascular. Reccent hospitalization for seizure. No brain mets. Seizures controlled. Hospitalized last week with left leg DVT and anemia. Hemoglobin is now stable. She received blood transfusion. No active bleeding. She is currently on Eliquis for DVT. Tolerated well with no side effects. We discussed resuming treatment. We will resume treatment on Friday. Reminded about possible side effects. She agreed. Monitor toxicity and response to treatment  Patient's questions were answered to the best of her satisfaction and she verbalized full understanding and agreement.

## 2021-03-17 ENCOUNTER — OFFICE VISIT (OUTPATIENT)
Dept: INFECTIOUS DISEASES | Age: 56
End: 2021-03-17
Payer: COMMERCIAL

## 2021-03-17 VITALS
WEIGHT: 155 LBS | HEIGHT: 65 IN | SYSTOLIC BLOOD PRESSURE: 121 MMHG | TEMPERATURE: 97.1 F | BODY MASS INDEX: 25.83 KG/M2 | DIASTOLIC BLOOD PRESSURE: 74 MMHG

## 2021-03-17 DIAGNOSIS — D73.3 SPLEEN, ABSCESS: Primary | ICD-10-CM

## 2021-03-17 PROCEDURE — 99213 OFFICE O/P EST LOW 20 MIN: CPT | Performed by: INTERNAL MEDICINE

## 2021-03-17 RX ORDER — AMOXICILLIN 500 MG/1
500 CAPSULE ORAL 3 TIMES DAILY
Qty: 30 CAPSULE | Refills: 0 | Status: SHIPPED | OUTPATIENT
Start: 2021-03-17 | End: 2021-03-27

## 2021-03-17 ASSESSMENT — ENCOUNTER SYMPTOMS
APNEA: 0
ABDOMINAL DISTENTION: 0
EYE ITCHING: 0
COLOR CHANGE: 0
DIARRHEA: 0

## 2021-03-17 NOTE — PATIENT INSTRUCTIONS
CALLED IR - Left voicemail asking them to call. Plan to scheduled US first- decide if drain can be removed, then removed in IR   told pt I will call her in AM     US spleen canceled - Scheduled CT ABD per [facility] radiologist.  Dr approved.  3/18/21   Gaby Byers

## 2021-03-17 NOTE — PROGRESS NOTES
not looking like pus        I have personally reviewed the past medical history, past surgical history, medications, social history, and family history, and I haveupdated the database accordingly. Past Medical History:     Past Medical History:   Diagnosis Date    Anemia     Bleeding 10/2020    intra-abdominal bleeding -due to splenic mass with GI infiltration. Status post embolization    Cervical cancer ([facility] [address]     Depression     Diabetes mellitus ([facility] [address]     GERD (gastroesophageal reflux disease)     Metastatic cancer ([facility] [address] 10/2020    extensive intraabdominal and splenic involvement and lung mets.  Ovarian cancer ([facility] [address]     low grade serous ovarian carcinoma    Post chemo evaluation     2007: Chemo via med onc (Dr. Gail Hunter), 2008: [place] [facility] due to rising CA-125, 2013: intraperitoneal chemo,12/2015: Ca125 - 25     Splenic lesion        Past Surgical  History:     Past Surgical History:   Procedure Laterality Date    ABSCESS DRAINAGE  2013    Franca rectal    ANUS SURGERY      ANAL FISSURECTOMY    COLECTOMY  03/2013    ex lap, tumor debulking, transverse colectomy w reanastamosis, subgastric omentectomy, intraperitoneal port placement    HYSTERECTOMY, TOTAL ABDOMINAL      IR EMBOLIZATION HEMORRHAGE  10/05/2020    intra-abdominal bleeding -due to splenic mass with GI infiltration. Status post embolization [employer] interlock coils x7. mri condtional 3t ok, safe immediately post implant.     IR PORT PLACEMENT EQUAL OR GREATER THAN 5 YEARS  8/24/2020    IR PORT PLACEMENT EQUAL OR GREATER THAN 5 YEARS 8/24/2020 Jerica Koenig MD [facility]    PORT SURGERY      IP Port    TONSILLECTOMY         Medications:     Current Outpatient Medications:     amoxicillin (AMOXIL) 500 MG capsule, Take 1 capsule by mouth 3 times daily for 10 days, Disp: 30 capsule, Rfl: 0    apixaban (ELIQUIS) 5 MG TABS tablet, Take 1 tablet by mouth 2 times daily, Disp: 60 tablet, Rfl: 0    senna (SENOKOT) 8.6 MG Smokeless tobacco: Current User   Substance and Sexual Activity    Alcohol use: Not Currently    Drug use: Never    Sexual activity: Not on file   Lifestyle    Physical activity     Days per week: Not on file     Minutes per session: Not on file    Stress: Not on file   Relationships    Social connections     Talks on phone: Not on file     Gets together: Not on file     Attends Judaism service: Not on file     Active member of club or organization: Not on file     Attends meetings of clubs or organizations: Not on file     Relationship status: Not on file    Intimate partner violence     Fear of current or ex partner: Not on file     Emotionally abused: Not on file     Physically abused: Not on file     Forced sexual activity: Not on file   Other Topics Concern    Not on file   Social History Narrative    Not on file       Family History:     Family History   Problem Relation Age of Onset    Alcohol Abuse Mother     Cirrhosis Mother         Allergies:   Ceftriaxone     Review of Systems:   Review of Systems   Constitutional: Positive for fatigue. Negative for activity change. HENT: Negative for congestion. Eyes: Negative for itching. Respiratory: Negative for apnea. Cardiovascular: Negative for chest pain. Gastrointestinal: Negative for abdominal distention and diarrhea (stools soft and no more diarrhea). Endocrine: Negative for heat intolerance. Genitourinary: Negative for dysuria. Musculoskeletal: Negative for arthralgias. Skin: Negative for color change. Allergic/Immunologic: Negative for immunocompromised state. Neurological: Positive for numbness. Negative for dizziness and light-headedness. Hematological: Negative for adenopathy. Psychiatric/Behavioral: Negative for agitation. Physical Examination :   Blood pressure 121/74, temperature 97.1 °F (36.2 °C), temperature source Temporal, height 5' 5\" (1.651 m), weight 155 lb (70.3 kg).     Physical Exam Constitutional:       General: She is not in acute distress. Appearance: Normal appearance. She is not ill-appearing. HENT:      Head: Normocephalic and atraumatic. Nose: Nose normal.      Mouth/Throat:      Mouth: Mucous membranes are moist.   Eyes:      Conjunctiva/sclera: Conjunctivae normal.   Neck:      Musculoskeletal: Neck supple. No neck rigidity. Cardiovascular:      Rate and Rhythm: Normal rate and regular rhythm. Heart sounds: Normal heart sounds. No murmur. No friction rub. Pulmonary:      Effort: No respiratory distress. Breath sounds: Normal breath sounds. Abdominal:      General: There is no distension. Palpations: Abdomen is soft. There is no mass. Genitourinary:     Comments: No wright  Musculoskeletal:         General: No swelling or deformity. Skin:     General: Skin is dry. Coloration: Skin is not jaundiced. Findings: No bruising. Neurological:      General: No focal deficit present. Mental Status: She is alert and oriented to person, place, and time. Cranial Nerves: No cranial nerve deficit. Psychiatric:         Mood and Affect: Mood normal.         Thought Content:  Thought content normal.           Medical Decision Making:   I have independently reviewed/ordered the following labs:    CBCwith Differential:   Lab Results   Component Value Date    WBC 7.8 03/10/2021    WBC 9.9 03/09/2021    HGB 8.2 03/12/2021    HGB 7.9 03/12/2021    HCT 28.8 03/12/2021    HCT 26.8 03/12/2021     03/10/2021     03/09/2021    LYMPHOPCT 14 03/09/2021    LYMPHOPCT 43 02/26/2021    MONOPCT 15 03/09/2021    MONOPCT 8 02/26/2021     BMP:  Lab Results   Component Value Date     03/12/2021     03/10/2021    K 4.0 03/12/2021    K 4.2 03/10/2021     03/12/2021     03/10/2021    CO2 23 03/12/2021    CO2 23 03/10/2021    BUN 7 03/12/2021    BUN 8 03/10/2021    CREATININE 0.32 03/12/2021    CREATININE 0.40 03/10/2021    MG 1.9 03/10/2021    MG 1.8 01/14/2021     Hepatic Function Panel:   Lab Results   Component Value Date    PROT 6.4 03/10/2021    PROT 7.4 03/09/2021    LABALBU 3.3 03/10/2021    LABALBU 4.0 03/09/2021    BILIDIR <0.08 02/11/2021    BILIDIR <0.08 01/29/2021    IBILI CANNOT BE CALCULATED 02/11/2021    IBILI CANNOT BE CALCULATED 01/29/2021    BILITOT 0.20 03/10/2021    BILITOT <0.10 03/09/2021    ALKPHOS 66 03/10/2021    ALKPHOS 76 03/09/2021    ALT 7 03/10/2021    ALT 9 03/09/2021    AST 13 03/10/2021    AST 22 03/09/2021     No results found for: RPR  No results found for: HIV  No results found for: [facility]  Lab Results   Component Value Date    MUCUS NOT REPORTED 03/10/2021    RBC 3.14 03/10/2021    TRICHOMONAS NOT REPORTED 03/10/2021    WBC 7.8 03/10/2021    YEAST NOT REPORTED 03/10/2021    TURBIDITY CLEAR 03/10/2021     Lab Results   Component Value Date    CREATININE 0.32 03/12/2021    GLUCOSE 97 03/12/2021   you for allowing us to participate in the care of this patient. Please call with questions. Jeannette Palacios MD  - Office: [phone]    Please note that this chart was generated using voice recognition Dragon dictation software. Although every effort was made to ensure the accuracy of this automated transcription, some errors in transcription mayhave occurred.

## 2021-03-18 ENCOUNTER — TELEPHONE (OUTPATIENT)
Dept: INFECTIOUS DISEASES | Age: 56
End: 2021-03-18

## 2021-03-18 DIAGNOSIS — D73.3 SPLENIC ABSCESS: Primary | ICD-10-CM

## 2021-03-18 NOTE — TELEPHONE ENCOUNTER
Faxed with note to call Dr Price Faust with CT finding prior to removal.   Provided Dr Price Faust cell number.

## 2021-03-18 NOTE — TELEPHONE ENCOUNTER
Per [facility] - they will not remove drain that another facility placed. Called pt - drain was placed by Sharad Hernández. [facility] - radiologist will not remove drain based on an ultrasound. Please order CT ABD -   [facility] WILL CALL PT TO SCHEDULE ONCE I FAX ORDER. [facility]     Dr Zo Tran checked recent Creatine - gave verbal approval for CT Abd order. Signed order and faxed to Sharad Hernández. lmom to inform pt of change and that Sharad Hernández will her.

## 2021-03-19 ENCOUNTER — TELEPHONE (OUTPATIENT)
Dept: ONCOLOGY | Age: 56
End: 2021-03-19

## 2021-03-19 ENCOUNTER — HOSPITAL ENCOUNTER (OUTPATIENT)
Dept: INFUSION THERAPY | Age: 56
Discharge: HOME | End: 2021-03-19

## 2021-03-19 VITALS
DIASTOLIC BLOOD PRESSURE: 74 MMHG | RESPIRATION RATE: 16 BRPM | BODY MASS INDEX: 25.96 KG/M2 | TEMPERATURE: 97.9 F | WEIGHT: 156 LBS | HEART RATE: 90 BPM | SYSTOLIC BLOOD PRESSURE: 119 MMHG

## 2021-03-19 DIAGNOSIS — C56.9 MALIGNANT NEOPLASM OF OVARY, UNSPECIFIED LATERALITY (HCC): Primary | ICD-10-CM

## 2021-03-19 DIAGNOSIS — Z85.43 HISTORY OF MALIGNANT NEOPLASM OF OVARY: ICD-10-CM

## 2021-03-19 DIAGNOSIS — C79.51 CANCER, METASTATIC TO BONE (HCC): ICD-10-CM

## 2021-03-19 LAB
ABSOLUTE EOS #: 0.2 K/UL (ref 0–0.4)
ABSOLUTE IMMATURE GRANULOCYTE: ABNORMAL K/UL (ref 0–0.3)
ABSOLUTE LYMPH #: 3.14 K/UL (ref 1–4.8)
ABSOLUTE MONO #: 0.98 K/UL (ref 0.1–0.8)
ALBUMIN SERPL-MCNC: 3.8 G/DL (ref 3.5–5.2)
ALBUMIN/GLOBULIN RATIO: 1.5 (ref 1–2.5)
ALP BLD-CCNC: 71 U/L (ref 35–104)
ALT SERPL-CCNC: 5 U/L (ref 5–33)
ANION GAP SERPL CALCULATED.3IONS-SCNC: 10 MMOL/L (ref 9–17)
AST SERPL-CCNC: 13 U/L
BASOPHILS # BLD: 0 % (ref 0–2)
BASOPHILS ABSOLUTE: 0 K/UL (ref 0–0.2)
BILIRUB SERPL-MCNC: 0.12 MG/DL (ref 0.3–1.2)
BUN BLDV-MCNC: 10 MG/DL (ref 6–20)
BUN/CREAT BLD: ABNORMAL (ref 9–20)
CALCIUM SERPL-MCNC: 8.5 MG/DL (ref 8.6–10.4)
CHLORIDE BLD-SCNC: 104 MMOL/L (ref 98–107)
CO2: 23 MMOL/L (ref 20–31)
CREAT SERPL-MCNC: <0.4 MG/DL (ref 0.5–0.9)
DIFFERENTIAL TYPE: ABNORMAL
EOSINOPHILS RELATIVE PERCENT: 2 % (ref 1–4)
GFR AFRICAN AMERICAN: ABNORMAL ML/MIN
GFR NON-AFRICAN AMERICAN: ABNORMAL ML/MIN
GFR SERPL CREATININE-BSD FRML MDRD: ABNORMAL ML/MIN/{1.73_M2}
GFR SERPL CREATININE-BSD FRML MDRD: ABNORMAL ML/MIN/{1.73_M2}
GLUCOSE BLD-MCNC: 116 MG/DL (ref 70–99)
HCT VFR BLD CALC: 29.6 % (ref 36–46)
HEMOGLOBIN: 9 G/DL (ref 12–16)
IMMATURE GRANULOCYTES: ABNORMAL %
LYMPHOCYTES # BLD: 32 % (ref 24–44)
MCH RBC QN AUTO: 23.9 PG (ref 26–34)
MCHC RBC AUTO-ENTMCNC: 30.3 G/DL (ref 31–37)
MCV RBC AUTO: 78.8 FL (ref 80–100)
MONOCYTES # BLD: 10 % (ref 1–7)
MORPHOLOGY: ABNORMAL
NRBC AUTOMATED: ABNORMAL PER 100 WBC
PDW BLD-RTO: 23.2 % (ref 12.5–15.4)
PLATELET # BLD: 1291 K/UL (ref 140–450)
PLATELET ESTIMATE: ABNORMAL
PMV BLD AUTO: 6.6 FL (ref 6–12)
POTASSIUM SERPL-SCNC: 4.1 MMOL/L (ref 3.7–5.3)
RBC # BLD: 3.76 M/UL (ref 4–5.2)
RBC # BLD: ABNORMAL 10*6/UL
SEG NEUTROPHILS: 56 % (ref 36–66)
SEGMENTED NEUTROPHILS ABSOLUTE COUNT: 5.48 K/UL (ref 1.8–7.7)
SODIUM BLD-SCNC: 137 MMOL/L (ref 135–144)
TOTAL PROTEIN: 6.3 G/DL (ref 6.4–8.3)
WBC # BLD: 9.8 K/UL (ref 3.5–11)
WBC # BLD: ABNORMAL 10*3/UL

## 2021-03-19 PROCEDURE — 96375 TX/PRO/DX INJ NEW DRUG ADDON: CPT

## 2021-03-19 PROCEDURE — 85025 COMPLETE CBC W/AUTO DIFF WBC: CPT

## 2021-03-19 PROCEDURE — 96413 CHEMO IV INFUSION 1 HR: CPT

## 2021-03-19 PROCEDURE — 80053 COMPREHEN METABOLIC PANEL: CPT

## 2021-03-19 PROCEDURE — 6360000002 HC RX W HCPCS: Performed by: INTERNAL MEDICINE

## 2021-03-19 PROCEDURE — 2580000003 HC RX 258: Performed by: INTERNAL MEDICINE

## 2021-03-19 PROCEDURE — 36591 DRAW BLOOD OFF VENOUS DEVICE: CPT

## 2021-03-19 RX ORDER — DEXAMETHASONE SODIUM PHOSPHATE 4 MG/ML
8 INJECTION, SOLUTION INTRA-ARTICULAR; INTRALESIONAL; INTRAMUSCULAR; INTRAVENOUS; SOFT TISSUE ONCE
Status: COMPLETED | OUTPATIENT
Start: 2021-03-19 | End: 2021-03-19

## 2021-03-19 RX ORDER — HEPARIN SODIUM (PORCINE) LOCK FLUSH IV SOLN 100 UNIT/ML 100 UNIT/ML
500 SOLUTION INTRAVENOUS PRN
Status: DISCONTINUED | OUTPATIENT
Start: 2021-03-19 | End: 2021-03-20 | Stop reason: HOSPADM

## 2021-03-19 RX ORDER — SODIUM CHLORIDE 9 MG/ML
20 INJECTION, SOLUTION INTRAVENOUS ONCE
Status: COMPLETED | OUTPATIENT
Start: 2021-03-19 | End: 2021-03-19

## 2021-03-19 RX ORDER — SODIUM CHLORIDE 0.9 % (FLUSH) 0.9 %
10 SYRINGE (ML) INJECTION PRN
Status: DISCONTINUED | OUTPATIENT
Start: 2021-03-19 | End: 2021-03-20 | Stop reason: HOSPADM

## 2021-03-19 RX ADMIN — SODIUM CHLORIDE, PRESERVATIVE FREE 10 ML: 5 INJECTION INTRAVENOUS at 11:19

## 2021-03-19 RX ADMIN — DEXAMETHASONE SODIUM PHOSPHATE 8 MG: 4 INJECTION, SOLUTION INTRA-ARTICULAR; INTRALESIONAL; INTRAMUSCULAR; INTRAVENOUS; SOFT TISSUE at 10:17

## 2021-03-19 RX ADMIN — SODIUM CHLORIDE 20 ML/HR: 9 INJECTION, SOLUTION INTRAVENOUS at 10:00

## 2021-03-19 RX ADMIN — HEPARIN 500 UNITS: 100 SYRINGE at 11:19

## 2021-03-19 RX ADMIN — SODIUM CHLORIDE, PRESERVATIVE FREE 10 ML: 5 INJECTION INTRAVENOUS at 09:19

## 2021-03-19 RX ADMIN — GEMCITABINE 1800 MG: 38 INJECTION, SOLUTION INTRAVENOUS at 10:42

## 2021-03-19 NOTE — TELEPHONE ENCOUNTER
Name: [facility]  : 1965  MRN: [id number]    Oncology Navigation Follow-Up Note    Contact Type:  Telephone  Notes: Pt @ [facility] for tx. Met with pt in infusion room. Pt denied questions/concerns. Instructed pt may contact writer prn. Will continue to follow.     Electronically signed by Yadira Collins RN on 3/19/2021 at 9:49 AM

## 2021-03-19 NOTE — FLOWSHEET NOTE
Situation:  Writer visited with Ms. Garcia and Daughter in the treatment cubicle. Assessment:  Ms. Benjamin Lind introduced her daughter. She reported that she was doing better after being in the hospital. She smiled and responded briefly as writer noted that she and Pt's son prayed for her and that she asked the  to visit her. Pt and Daughter expressed gratitude for the warmer weather. Intervention:  Writer provided supportive presence and explored Pt's coping and needs; offered words of encouragement and support; wished Pt well; asked Pt to tell her son she said hello. Outcome:  Pt and Daughter thanked writer. They told writer they would pass along her greetings to Pt's son, who is also a patient.        03/19/21 1228   Encounter Summary   Services provided to: Patient and family together   Referral/Consult From: [facility] Children;Family members   Continue Visiting   (3/19/21)   Complexity of Encounter Low   Length of Encounter 15 minutes   Routine   Type Follow up   Assessment Calm; Approachable;Coping   Intervention Active listening;Explored feelings, thoughts, concerns;Explored coping resources;Sustaining presence/ Ministry of presence   Outcome Expressed gratitude;Coping;Encouraged; Hopeful;Receptive     Electronically signed by Tom Mays, Oncology Outpatient [facility] [address], [address] [facility]  3/19/2021  12:30 PM

## 2021-03-19 NOTE — PROGRESS NOTES
[facility] Labs drawn from port and reviewed. Platelets 9389 Dr. Collazo Flank updated and ordered to proceed with treatment. She tolerated treatment well and was discharged home with family in stable condition. She is due to return 3/26 for C2D8.

## 2021-03-26 ENCOUNTER — HOSPITAL ENCOUNTER (OUTPATIENT)
Dept: INFUSION THERAPY | Age: 56
Discharge: HOME | End: 2021-03-26

## 2021-03-26 VITALS
RESPIRATION RATE: 16 BRPM | TEMPERATURE: 98.2 F | WEIGHT: 156 LBS | SYSTOLIC BLOOD PRESSURE: 123 MMHG | DIASTOLIC BLOOD PRESSURE: 72 MMHG | HEART RATE: 76 BPM | BODY MASS INDEX: 25.96 KG/M2

## 2021-03-26 DIAGNOSIS — C56.9 MALIGNANT NEOPLASM OF OVARY, UNSPECIFIED LATERALITY (HCC): Primary | ICD-10-CM

## 2021-03-26 DIAGNOSIS — C79.51 CANCER, METASTATIC TO BONE (HCC): ICD-10-CM

## 2021-03-26 DIAGNOSIS — Z85.43 HISTORY OF MALIGNANT NEOPLASM OF OVARY: ICD-10-CM

## 2021-03-26 LAB
ABSOLUTE EOS #: 0 K/UL (ref 0–0.4)
ABSOLUTE IMMATURE GRANULOCYTE: ABNORMAL K/UL (ref 0–0.3)
ABSOLUTE LYMPH #: 1.81 K/UL (ref 1–4.8)
ABSOLUTE MONO #: 0.64 K/UL (ref 0.1–0.8)
ALBUMIN SERPL-MCNC: 4 G/DL (ref 3.5–5.2)
ALBUMIN/GLOBULIN RATIO: 1.2 (ref 1–2.5)
ALP BLD-CCNC: 68 U/L (ref 35–104)
ALT SERPL-CCNC: 5 U/L (ref 5–33)
ANION GAP SERPL CALCULATED.3IONS-SCNC: 10 MMOL/L (ref 9–17)
AST SERPL-CCNC: 11 U/L
BASOPHILS # BLD: 0 % (ref 0–2)
BASOPHILS ABSOLUTE: 0 K/UL (ref 0–0.2)
BILIRUB SERPL-MCNC: 0.17 MG/DL (ref 0.3–1.2)
BUN BLDV-MCNC: 14 MG/DL (ref 6–20)
BUN/CREAT BLD: ABNORMAL (ref 9–20)
CALCIUM SERPL-MCNC: 9.2 MG/DL (ref 8.6–10.4)
CHLORIDE BLD-SCNC: 104 MMOL/L (ref 98–107)
CO2: 24 MMOL/L (ref 20–31)
CREAT SERPL-MCNC: <0.4 MG/DL (ref 0.5–0.9)
DIFFERENTIAL TYPE: ABNORMAL
EOSINOPHILS RELATIVE PERCENT: 0 % (ref 1–4)
GFR AFRICAN AMERICAN: ABNORMAL ML/MIN
GFR NON-AFRICAN AMERICAN: ABNORMAL ML/MIN
GFR SERPL CREATININE-BSD FRML MDRD: ABNORMAL ML/MIN/{1.73_M2}
GFR SERPL CREATININE-BSD FRML MDRD: ABNORMAL ML/MIN/{1.73_M2}
GLUCOSE BLD-MCNC: 113 MG/DL (ref 70–99)
HCT VFR BLD CALC: 27.8 % (ref 36–46)
HEMOGLOBIN: 8.6 G/DL (ref 12–16)
IMMATURE GRANULOCYTES: ABNORMAL %
LYMPHOCYTES # BLD: 37 % (ref 24–44)
MCH RBC QN AUTO: 23.8 PG (ref 26–34)
MCHC RBC AUTO-ENTMCNC: 30.9 G/DL (ref 31–37)
MCV RBC AUTO: 76.8 FL (ref 80–100)
MONOCYTES # BLD: 13 % (ref 1–7)
MORPHOLOGY: ABNORMAL
NRBC AUTOMATED: ABNORMAL PER 100 WBC
PDW BLD-RTO: 24.4 % (ref 12.5–15.4)
PLATELET # BLD: 923 K/UL (ref 140–450)
PLATELET ESTIMATE: ABNORMAL
PMV BLD AUTO: 6.4 FL (ref 6–12)
POTASSIUM SERPL-SCNC: 4.1 MMOL/L (ref 3.7–5.3)
RBC # BLD: 3.62 M/UL (ref 4–5.2)
RBC # BLD: ABNORMAL 10*6/UL
SEG NEUTROPHILS: 50 % (ref 36–66)
SEGMENTED NEUTROPHILS ABSOLUTE COUNT: 2.45 K/UL (ref 1.8–7.7)
SODIUM BLD-SCNC: 138 MMOL/L (ref 135–144)
TOTAL PROTEIN: 7.3 G/DL (ref 6.4–8.3)
WBC # BLD: 4.9 K/UL (ref 3.5–11)
WBC # BLD: ABNORMAL 10*3/UL

## 2021-03-26 PROCEDURE — 6360000002 HC RX W HCPCS: Performed by: INTERNAL MEDICINE

## 2021-03-26 PROCEDURE — 2580000003 HC RX 258: Performed by: INTERNAL MEDICINE

## 2021-03-26 PROCEDURE — 36591 DRAW BLOOD OFF VENOUS DEVICE: CPT

## 2021-03-26 PROCEDURE — 85025 COMPLETE CBC W/AUTO DIFF WBC: CPT

## 2021-03-26 PROCEDURE — 96413 CHEMO IV INFUSION 1 HR: CPT

## 2021-03-26 PROCEDURE — 96375 TX/PRO/DX INJ NEW DRUG ADDON: CPT

## 2021-03-26 PROCEDURE — 80053 COMPREHEN METABOLIC PANEL: CPT

## 2021-03-26 RX ORDER — HEPARIN SODIUM (PORCINE) LOCK FLUSH IV SOLN 100 UNIT/ML 100 UNIT/ML
500 SOLUTION INTRAVENOUS PRN
Status: DISCONTINUED | OUTPATIENT
Start: 2021-03-26 | End: 2021-03-27 | Stop reason: HOSPADM

## 2021-03-26 RX ORDER — DEXAMETHASONE SODIUM PHOSPHATE 4 MG/ML
8 INJECTION, SOLUTION INTRA-ARTICULAR; INTRALESIONAL; INTRAMUSCULAR; INTRAVENOUS; SOFT TISSUE ONCE
Status: COMPLETED | OUTPATIENT
Start: 2021-03-26 | End: 2021-03-26

## 2021-03-26 RX ORDER — SODIUM CHLORIDE 0.9 % (FLUSH) 0.9 %
10 SYRINGE (ML) INJECTION PRN
Status: DISCONTINUED | OUTPATIENT
Start: 2021-03-26 | End: 2021-03-27 | Stop reason: HOSPADM

## 2021-03-26 RX ORDER — LEVETIRACETAM 750 MG/1
750 TABLET ORAL 2 TIMES DAILY
Qty: 120 TABLET | Refills: 2 | Status: SHIPPED | OUTPATIENT
Start: 2021-03-26 | End: 2021-05-03 | Stop reason: SDUPTHER

## 2021-03-26 RX ORDER — SODIUM CHLORIDE 9 MG/ML
20 INJECTION, SOLUTION INTRAVENOUS ONCE
Status: COMPLETED | OUTPATIENT
Start: 2021-03-26 | End: 2021-03-26

## 2021-03-26 RX ADMIN — GEMCITABINE 1800 MG: 38 INJECTION, SOLUTION INTRAVENOUS at 12:23

## 2021-03-26 RX ADMIN — HEPARIN 500 UNITS: 100 SYRINGE at 13:07

## 2021-03-26 RX ADMIN — DEXAMETHASONE SODIUM PHOSPHATE 8 MG: 4 INJECTION, SOLUTION INTRA-ARTICULAR; INTRALESIONAL; INTRAMUSCULAR; INTRAVENOUS; SOFT TISSUE at 12:10

## 2021-03-26 RX ADMIN — SODIUM CHLORIDE, PRESERVATIVE FREE 10 ML: 5 INJECTION INTRAVENOUS at 13:06

## 2021-03-26 RX ADMIN — SODIUM CHLORIDE, PRESERVATIVE FREE 10 ML: 5 INJECTION INTRAVENOUS at 11:26

## 2021-03-26 RX ADMIN — SODIUM CHLORIDE 20 ML/HR: 9 INJECTION, SOLUTION INTRAVENOUS at 12:10

## 2021-03-26 NOTE — PROGRESS NOTES
Patient here for [facility] C2D8. Labs drawn from port and reviewed. She tolerated treatment well and was discharged home in stable condition with family. She is due to return 4/9 for MD visit and C3D1.

## 2021-04-02 ENCOUNTER — TELEPHONE (OUTPATIENT)
Dept: INFECTIOUS DISEASES | Age: 56
End: 2021-04-02

## 2021-04-02 NOTE — TELEPHONE ENCOUNTER
Pt called back:  [facility]  Id # [id number]   [facility]   [phone]

## 2021-04-08 ENCOUNTER — TELEPHONE (OUTPATIENT)
Dept: ONCOLOGY | Age: 56
End: 2021-04-08

## 2021-04-08 DIAGNOSIS — R11.2 NAUSEA AND VOMITING, INTRACTABILITY OF VOMITING NOT SPECIFIED, UNSPECIFIED VOMITING TYPE: ICD-10-CM

## 2021-04-08 DIAGNOSIS — C79.60 MALIGNANT NEOPLASM METASTATIC TO OVARY, UNSPECIFIED LATERALITY (HCC): Primary | ICD-10-CM

## 2021-04-08 RX ORDER — PANTOPRAZOLE SODIUM 40 MG/1
40 TABLET, DELAYED RELEASE ORAL 2 TIMES DAILY
Qty: 60 TABLET | Refills: 0 | Status: SHIPPED | OUTPATIENT
Start: 2021-04-08 | End: 2021-05-10 | Stop reason: SDUPTHER

## 2021-04-08 NOTE — TELEPHONE ENCOUNTER
ANALY KOVACS ASKING FOR REFILL ON PROTONIX. REFUSED PER DR MARK ON 4/7/2021    SPOKE WITH DR MARK NOT SURE WHY SCRIPT REFUSED, REPENDED TO MD AND CHANGED FROM 15 DAY SCRIPT TO 30 DAY.

## 2021-04-09 ENCOUNTER — HOSPITAL ENCOUNTER (OUTPATIENT)
Dept: INFUSION THERAPY | Age: 56
Discharge: HOME | End: 2021-04-09
Payer: COMMERCIAL

## 2021-04-09 ENCOUNTER — TELEPHONE (OUTPATIENT)
Dept: ONCOLOGY | Age: 56
End: 2021-04-09

## 2021-04-09 ENCOUNTER — OFFICE VISIT (OUTPATIENT)
Dept: ONCOLOGY | Age: 56
End: 2021-04-09
Payer: COMMERCIAL

## 2021-04-09 VITALS
HEART RATE: 104 BPM | WEIGHT: 159 LBS | BODY MASS INDEX: 26.46 KG/M2 | DIASTOLIC BLOOD PRESSURE: 46 MMHG | TEMPERATURE: 98.3 F | RESPIRATION RATE: 16 BRPM | SYSTOLIC BLOOD PRESSURE: 114 MMHG

## 2021-04-09 DIAGNOSIS — M54.59 INTRACTABLE LOW BACK PAIN: ICD-10-CM

## 2021-04-09 DIAGNOSIS — C79.60 MALIGNANT NEOPLASM METASTATIC TO OVARY, UNSPECIFIED LATERALITY (HCC): Primary | ICD-10-CM

## 2021-04-09 DIAGNOSIS — C79.60 MALIGNANT NEOPLASM METASTATIC TO OVARY, UNSPECIFIED LATERALITY (HCC): ICD-10-CM

## 2021-04-09 DIAGNOSIS — C56.9 MALIGNANT NEOPLASM OF OVARY, UNSPECIFIED LATERALITY (HCC): Primary | ICD-10-CM

## 2021-04-09 DIAGNOSIS — C79.51 CANCER, METASTATIC TO BONE (HCC): ICD-10-CM

## 2021-04-09 DIAGNOSIS — Z85.43 HISTORY OF MALIGNANT NEOPLASM OF OVARY: ICD-10-CM

## 2021-04-09 LAB
ABSOLUTE EOS #: 0.32 K/UL (ref 0–0.4)
ABSOLUTE IMMATURE GRANULOCYTE: ABNORMAL K/UL (ref 0–0.3)
ABSOLUTE LYMPH #: 1.73 K/UL (ref 1–4.8)
ABSOLUTE MONO #: 1.3 K/UL (ref 0.1–1.2)
ALBUMIN SERPL-MCNC: 4 G/DL (ref 3.5–5.2)
ALBUMIN/GLOBULIN RATIO: 1.4 (ref 1–2.5)
ALP BLD-CCNC: 66 U/L (ref 35–104)
ALT SERPL-CCNC: 5 U/L (ref 5–33)
ANION GAP SERPL CALCULATED.3IONS-SCNC: 10 MMOL/L (ref 9–17)
AST SERPL-CCNC: 11 U/L
BASOPHILS # BLD: 1 % (ref 0–2)
BASOPHILS ABSOLUTE: 0.11 K/UL (ref 0–0.2)
BILIRUB SERPL-MCNC: 0.13 MG/DL (ref 0.3–1.2)
BUN BLDV-MCNC: 9 MG/DL (ref 6–20)
BUN/CREAT BLD: ABNORMAL (ref 9–20)
CA 125: 331 U/ML
CALCIUM SERPL-MCNC: 8.5 MG/DL (ref 8.6–10.4)
CHLORIDE BLD-SCNC: 104 MMOL/L (ref 98–107)
CO2: 25 MMOL/L (ref 20–31)
CREAT SERPL-MCNC: 0.41 MG/DL (ref 0.5–0.9)
DIFFERENTIAL TYPE: ABNORMAL
EOSINOPHILS RELATIVE PERCENT: 3 % (ref 1–4)
GFR AFRICAN AMERICAN: >60 ML/MIN
GFR NON-AFRICAN AMERICAN: >60 ML/MIN
GFR SERPL CREATININE-BSD FRML MDRD: ABNORMAL ML/MIN/{1.73_M2}
GFR SERPL CREATININE-BSD FRML MDRD: ABNORMAL ML/MIN/{1.73_M2}
GLUCOSE BLD-MCNC: 113 MG/DL (ref 70–99)
HCT VFR BLD CALC: 24 % (ref 36–46)
HEMOGLOBIN: 6.6 G/DL (ref 12–16)
IMMATURE GRANULOCYTES: ABNORMAL %
LYMPHOCYTES # BLD: 16 % (ref 24–44)
MCH RBC QN AUTO: 22.1 PG (ref 26–34)
MCHC RBC AUTO-ENTMCNC: 27.5 G/DL (ref 31–37)
MCV RBC AUTO: 80.3 FL (ref 80–100)
MONOCYTES # BLD: 12 % (ref 2–11)
MORPHOLOGY: ABNORMAL
NRBC AUTOMATED: ABNORMAL PER 100 WBC
PDW BLD-RTO: 22.5 % (ref 12.5–15.4)
PLATELET # BLD: 817 K/UL (ref 140–450)
PLATELET ESTIMATE: ABNORMAL
PMV BLD AUTO: 9.3 FL (ref 8–14)
POTASSIUM SERPL-SCNC: 4.1 MMOL/L (ref 3.7–5.3)
RBC # BLD: 2.99 M/UL (ref 4–5.2)
RBC # BLD: ABNORMAL 10*6/UL
SEG NEUTROPHILS: 68 % (ref 36–66)
SEGMENTED NEUTROPHILS ABSOLUTE COUNT: 7.34 K/UL (ref 1.8–7.7)
SODIUM BLD-SCNC: 139 MMOL/L (ref 135–144)
TOTAL PROTEIN: 6.9 G/DL (ref 6.4–8.3)
WBC # BLD: 10.8 K/UL (ref 3.5–11)
WBC # BLD: ABNORMAL 10*3/UL

## 2021-04-09 PROCEDURE — 99214 OFFICE O/P EST MOD 30 MIN: CPT | Performed by: INTERNAL MEDICINE

## 2021-04-09 PROCEDURE — G8417 CALC BMI ABV UP PARAM F/U: HCPCS | Performed by: INTERNAL MEDICINE

## 2021-04-09 PROCEDURE — 3017F COLORECTAL CA SCREEN DOC REV: CPT | Performed by: INTERNAL MEDICINE

## 2021-04-09 PROCEDURE — 85025 COMPLETE CBC W/AUTO DIFF WBC: CPT

## 2021-04-09 PROCEDURE — 80053 COMPREHEN METABOLIC PANEL: CPT

## 2021-04-09 PROCEDURE — 4004F PT TOBACCO SCREEN RCVD TLK: CPT | Performed by: INTERNAL MEDICINE

## 2021-04-09 PROCEDURE — 96375 TX/PRO/DX INJ NEW DRUG ADDON: CPT

## 2021-04-09 PROCEDURE — 86304 IMMUNOASSAY TUMOR CA 125: CPT

## 2021-04-09 PROCEDURE — 36591 DRAW BLOOD OFF VENOUS DEVICE: CPT

## 2021-04-09 PROCEDURE — 96413 CHEMO IV INFUSION 1 HR: CPT

## 2021-04-09 PROCEDURE — 2580000003 HC RX 258: Performed by: INTERNAL MEDICINE

## 2021-04-09 PROCEDURE — 99211 OFF/OP EST MAY X REQ PHY/QHP: CPT | Performed by: INTERNAL MEDICINE

## 2021-04-09 PROCEDURE — 6360000002 HC RX W HCPCS: Performed by: INTERNAL MEDICINE

## 2021-04-09 PROCEDURE — G8427 DOCREV CUR MEDS BY ELIG CLIN: HCPCS | Performed by: INTERNAL MEDICINE

## 2021-04-09 PROCEDURE — 1111F DSCHRG MED/CURRENT MED MERGE: CPT | Performed by: INTERNAL MEDICINE

## 2021-04-09 RX ORDER — HEPARIN SODIUM (PORCINE) LOCK FLUSH IV SOLN 100 UNIT/ML 100 UNIT/ML
500 SOLUTION INTRAVENOUS PRN
Status: DISCONTINUED | OUTPATIENT
Start: 2021-04-09 | End: 2021-04-10 | Stop reason: HOSPADM

## 2021-04-09 RX ORDER — SODIUM CHLORIDE 9 MG/ML
20 INJECTION, SOLUTION INTRAVENOUS ONCE
Status: COMPLETED | OUTPATIENT
Start: 2021-04-09 | End: 2021-04-09

## 2021-04-09 RX ORDER — SODIUM CHLORIDE 0.9 % (FLUSH) 0.9 %
10 SYRINGE (ML) INJECTION PRN
Status: DISCONTINUED | OUTPATIENT
Start: 2021-04-09 | End: 2021-04-10 | Stop reason: HOSPADM

## 2021-04-09 RX ORDER — DEXAMETHASONE SODIUM PHOSPHATE 4 MG/ML
8 INJECTION, SOLUTION INTRA-ARTICULAR; INTRALESIONAL; INTRAMUSCULAR; INTRAVENOUS; SOFT TISSUE ONCE
Status: COMPLETED | OUTPATIENT
Start: 2021-04-09 | End: 2021-04-09

## 2021-04-09 RX ORDER — MORPHINE SULFATE 30 MG/1
30 TABLET, FILM COATED, EXTENDED RELEASE ORAL 2 TIMES DAILY
Qty: 60 TABLET | Refills: 0 | Status: SHIPPED | OUTPATIENT
Start: 2021-04-09 | End: 2021-05-12

## 2021-04-09 RX ORDER — MORPHINE SULFATE 15 MG/1
15 TABLET, FILM COATED, EXTENDED RELEASE ORAL 2 TIMES DAILY
Qty: 60 TABLET | Refills: 0 | Status: SHIPPED | OUTPATIENT
Start: 2021-04-09 | End: 2021-05-12

## 2021-04-09 RX ORDER — OXYCODONE HYDROCHLORIDE AND ACETAMINOPHEN 5; 325 MG/1; MG/1
1 TABLET ORAL EVERY 4 HOURS PRN
Qty: 180 TABLET | Refills: 0 | Status: SHIPPED | OUTPATIENT
Start: 2021-04-09 | End: 2021-05-19

## 2021-04-09 RX ADMIN — SODIUM CHLORIDE, PRESERVATIVE FREE 10 ML: 5 INJECTION INTRAVENOUS at 12:05

## 2021-04-09 RX ADMIN — DEXAMETHASONE SODIUM PHOSPHATE 8 MG: 4 INJECTION, SOLUTION INTRAMUSCULAR; INTRAVENOUS at 10:41

## 2021-04-09 RX ADMIN — SODIUM CHLORIDE, PRESERVATIVE FREE 10 ML: 5 INJECTION INTRAVENOUS at 09:26

## 2021-04-09 RX ADMIN — GEMCITABINE 1800 MG: 38 INJECTION, SOLUTION INTRAVENOUS at 11:27

## 2021-04-09 RX ADMIN — SODIUM CHLORIDE, PRESERVATIVE FREE 10 ML: 5 INJECTION INTRAVENOUS at 09:25

## 2021-04-09 RX ADMIN — HEPARIN 500 UNITS: 100 SYRINGE at 12:05

## 2021-04-09 RX ADMIN — SODIUM CHLORIDE, PRESERVATIVE FREE 10 ML: 5 INJECTION INTRAVENOUS at 12:04

## 2021-04-09 RX ADMIN — SODIUM CHLORIDE 20 ML/HR: 9 INJECTION, SOLUTION INTRAVENOUS at 10:38

## 2021-04-09 NOTE — PROGRESS NOTES
Pt here for C3D1 of chemotherapy. Labs drawn per triage. HGB 6.6. Dr Sonny Rankin reviewed labs at Dr visit and to proceed with chemotherapy. Tolerated very well.  2 units PRBC's ordered and pt does not want them until next week. Refused to have 1 unit today. Scheduled for scans on 4/12 and refused to come then. She will have her type and cross match done that day at [facility] prior to scans. Will return 4/13 for blood transfusion. Pt states she has some iron supplements at home and she plans on taking these. Reinforced that if she is not feeling well to go to the ER. Pt states this is not her first rodeo and understands to go to ER. Will return 4/13 for transfusion.

## 2021-04-09 NOTE — TELEPHONE ENCOUNTER
Sent message to Shilpa Barton in IR to confirm that patient could have type and cross done during procedure Monday, 4[facility]     Alley Harper confirmed that it can be done, Pillo Tran RN put orders in for type and cross    Transfusion scheduled for 4/13/21, waiting for orders to be put in    Electronically signed by eC Carmona on 4/9/2021 at 12:05 PM

## 2021-04-09 NOTE — TELEPHONE ENCOUNTER
AVS from 4/9/21     Chemo today  Blood transfusion 2 units PRBCs on Monday   RV 2-3 weeks  Please add  to today's labs     Tx today as scheduled    Tyx ok to be done Tuesday per Dr. Garry Lieberman as patient has procedure Monday at [facility]      [facility] notified to do type and cross Monday    RV scheduled 4/30/21 @ 9am     added to today's labs    PT was given AVS and an appt schedule    Electronically signed by Monica Sears on 4/9/2021 at 10:49 AM

## 2021-04-10 NOTE — PROGRESS NOTES
_      Chief Complaint   Patient presents with    Follow-up     review disease status/C.3D.1 tx today    Other     gas    Fatigue     DIAGNOSIS:       Recurrent ovarian cancer. Original diagnosis 2005 with multiple recurrences. Diffuse metastasis. CURRENT THERAPY:         Doxil/ Avastin started 9/18/2020. Avastin was discontinued due to recent GI bleeding. Status post recent vascular embolization  S/p seizure disorder. Gemzar started 2/26/2021. Interrupted due to recent hospitalization    BRIEF CASE HISTORY:      Ms. Emily Dsouza is a very pleasant 60 y.o. female with history of multiple co morbidities as listed. The patient seen in consultation for ovarian cancer with multiple recurrences. 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 She had surgical debulking and she had systemic treatment with Taxol and carboplatin for 6 cycles. Patient was in remission for about 2 years. She had a relapse in 2007 and treated with topotecan with good results. Patient relapsed again in 2008 and was treated with Taxol carboplatin. After 3 cycles of systemic chemotherapy she had problems with carboplatin so she finished 3 more cycles with Taxol. She did well again for 2 to 3 years until she had a relapse in 2012 and she had intraperitoneal chemotherapy treatment with Taxol. Patient was last seen by her oncologist in 2014 at which time she had relatively stable disease with normal tumor marker and no significant abnormal images. Patient moved to [place] after that and she was lost for oncology follow-ups. Patient was recently evaluated again here in [place] because of abdominal discomfort. Re imaging confirmed metastatic relapse. Biopsy confirmed ovarian cancer recurrence. Scan showed extensive intraabdominal and splenic involvement and lung mets.  She has no symptoms at the present time. Patient denies smoking or alcohol drinking.l    INTERIM HISTORY:    patient is seen for follow up recurrent ovarian cancer. Patient received 1 cycle of palliative chemotherapy using Doxil/ Avastin. Patient was hospitalized at [facility] last week because of severe GI bleeding. She had multiple blood transfusions. She had GI and vascular evaluation. She had evidence of intra-abdominal mass at the splenic area with GI infiltration. Patient had embolization by vascular. Recently readmitted because of a seizure disorder. Images showed no brain mets. Currently seizures are controlled. Patient was hospitalized last week due to left leg DVT and anemia. She was treated accordingly. She received Eliquis. Tolerated well. She is having increasing weakness and fatigue. Hemoglobin is low. No clear active bleeding. No melena or hematochezia. No hematemesis. Switched to Gemzar      PAST MEDICAL HISTORY: has a past medical history of Anemia, Bleeding, Cervical cancer ([facility] [address] Depression, Diabetes mellitus ([facility] [address] GERD (gastroesophageal reflux disease), Metastatic cancer ([facility] [address] Ovarian cancer ([facility] [address] Post chemo evaluation, and Splenic lesion. PAST SURGICAL HISTORY: has a past surgical history that includes Hysterectomy, total abdominal; Port Surgery; Tonsillectomy; IR PORT PLACEMENT > 5 YEARS (8/24/2020); Anus surgery; Abscess Drainage (2013); colectomy (03/2013); and IR EMBOLIZATION HEMORRHAGE (10/05/2020). CURRENT MEDICATIONS:  has a current medication list which includes the following prescription(s): oxycodone-acetaminophen, morphine, morphine, pantoprazole, levetiracetam, apixaban, senna, ferrous sulfate, metformin, sertraline, metoprolol succinate, and aspirin, and the following Facility-Administered Medications: heparin flush and sodium chloride flush. ALLERGIES:  is allergic to ceftriaxone. FAMILY HISTORY: Negative for any hematological or oncological conditions. SOCIAL HISTORY:  reports that she has been smoking cigarettes. She has been smoking about 1.00 pack per day. She uses smokeless tobacco. She reports previous alcohol use. She reports that she does not use drugs. REVIEW OF SYSTEMS:     · General: No weakness or fatigue. No unanticipated weight loss or decreased appetite. No fever or chills. · Eyes: No blurred vision, eye pain or double vision. · Ears: No hearing problems or drainage. No tinnitus. · Throat: No sore throat, problems with swallowing or dysphagia. · Respiratory: No cough, sputum or hemoptysis. No shortness of breath. No pleuritic chest pain. · Cardiovascular: No chest pain, orthopnea or PND. No lower extremity edema. No palpitation. · Gastrointestinal: As above. · Genitourinary: No dysuria, hematuria, frequency or urgency. · Musculoskeletal: No muscle aches or pains. No limitation of movement. No back pain. No gait disturbance, No joint complaints. · Dermatologic: No skin rashes or pruritus. No skin lesions or discolorations. · Psychiatric: No depression, anxiety, or stress or signs of schizophrenia. No change in mood or affect. · Hematologic: No history of bleeding tendency. No bruises or ecchymosis. No history of clotting problems. · Infectious disease: No fever, chills or frequent infections. · Endocrine: No polydipsia or polyuria. No temperature intolerance. · Neurologic: No headaches or dizziness. No weakness or numbness of the extremities. No changes in balance, coordination,  memory, mentation, behavior. · Allergic/Immunologic: No nasal congestion or hives. No repeated infections. PHYSICAL EXAM:  The patient is not in acute distress. Vital signs: Blood pressure (!) 114/46, pulse 104, temperature 98.3 °F (36.8 °C), resp. rate 16, weight 159 lb (72.1 kg).      General appearance - well appearing, not in pain or distress  Mental status - good mood, alert and oriented  Eyes - pupils equal and reactive,

## 2021-04-12 ENCOUNTER — HOSPITAL ENCOUNTER (OUTPATIENT)
Dept: CT IMAGING | Age: 56
Discharge: HOME | End: 2021-04-14
Payer: COMMERCIAL

## 2021-04-12 ENCOUNTER — HOSPITAL ENCOUNTER (OUTPATIENT)
Dept: INTERVENTIONAL RADIOLOGY/VASCULAR | Age: 56
Discharge: HOME | End: 2021-04-14
Payer: COMMERCIAL

## 2021-04-12 ENCOUNTER — TELEPHONE (OUTPATIENT)
Dept: INFECTIOUS DISEASES | Age: 56
End: 2021-04-12

## 2021-04-12 ENCOUNTER — HOSPITAL ENCOUNTER (OUTPATIENT)
Age: 56
Discharge: HOME | End: 2021-04-12
Payer: COMMERCIAL

## 2021-04-12 DIAGNOSIS — C79.60 MALIGNANT NEOPLASM METASTATIC TO OVARY, UNSPECIFIED LATERALITY (HCC): ICD-10-CM

## 2021-04-12 DIAGNOSIS — D73.3 SPLEEN, ABSCESS: ICD-10-CM

## 2021-04-12 DIAGNOSIS — D73.3 SPLENIC ABSCESS: ICD-10-CM

## 2021-04-12 PROCEDURE — 86900 BLOOD TYPING SEROLOGIC ABO: CPT

## 2021-04-12 PROCEDURE — 36415 COLL VENOUS BLD VENIPUNCTURE: CPT

## 2021-04-12 PROCEDURE — P9016 RBC LEUKOCYTES REDUCED: HCPCS

## 2021-04-12 PROCEDURE — 6360000004 HC RX CONTRAST MEDICATION: Performed by: INTERNAL MEDICINE

## 2021-04-12 PROCEDURE — 74160 CT ABDOMEN W/CONTRAST: CPT

## 2021-04-12 PROCEDURE — 86880 COOMBS TEST DIRECT: CPT

## 2021-04-12 PROCEDURE — 86870 RBC ANTIBODY IDENTIFICATION: CPT

## 2021-04-12 PROCEDURE — 86920 COMPATIBILITY TEST SPIN: CPT

## 2021-04-12 PROCEDURE — 86902 BLOOD TYPE ANTIGEN DONOR EA: CPT

## 2021-04-12 PROCEDURE — 86850 RBC ANTIBODY SCREEN: CPT

## 2021-04-12 PROCEDURE — 86901 BLOOD TYPING SEROLOGIC RH(D): CPT

## 2021-04-12 RX ADMIN — IOPAMIDOL 75 ML: 755 INJECTION, SOLUTION INTRAVENOUS at 09:28

## 2021-04-12 RX ADMIN — IOHEXOL 50 ML: 240 INJECTION, SOLUTION INTRATHECAL; INTRAVASCULAR; INTRAVENOUS; ORAL at 09:23

## 2021-04-12 NOTE — TELEPHONE ENCOUNTER
Pt called to update:  She completed CT today, however will need to return to get drain removed. Due to [facility] placing drain- Radiologist didn't feel comfortable pulling today w/o her being prepped and scheduled to stay for a few hours for evaluation- pt risks bleeding. They will obtain [address] [place] [address] records regarding drain placement and call her to reschedule.

## 2021-04-13 ENCOUNTER — HOSPITAL ENCOUNTER (OUTPATIENT)
Dept: INFUSION THERAPY | Age: 56
Discharge: HOME | End: 2021-04-13
Payer: COMMERCIAL

## 2021-04-13 VITALS
HEART RATE: 83 BPM | RESPIRATION RATE: 16 BRPM | DIASTOLIC BLOOD PRESSURE: 54 MMHG | TEMPERATURE: 98.7 F | SYSTOLIC BLOOD PRESSURE: 113 MMHG

## 2021-04-13 DIAGNOSIS — C56.9 MALIGNANT NEOPLASM OF OVARY, UNSPECIFIED LATERALITY (HCC): Primary | ICD-10-CM

## 2021-04-13 PROCEDURE — 6360000002 HC RX W HCPCS: Performed by: INTERNAL MEDICINE

## 2021-04-13 PROCEDURE — P9016 RBC LEUKOCYTES REDUCED: HCPCS

## 2021-04-13 PROCEDURE — 36430 TRANSFUSION BLD/BLD COMPNT: CPT

## 2021-04-13 PROCEDURE — 2580000003 HC RX 258: Performed by: INTERNAL MEDICINE

## 2021-04-13 RX ORDER — HEPARIN SODIUM (PORCINE) LOCK FLUSH IV SOLN 100 UNIT/ML 100 UNIT/ML
500 SOLUTION INTRAVENOUS PRN
Status: DISCONTINUED | OUTPATIENT
Start: 2021-04-13 | End: 2021-04-14 | Stop reason: HOSPADM

## 2021-04-13 RX ORDER — SODIUM CHLORIDE 0.9 % (FLUSH) 0.9 %
20 SYRINGE (ML) INJECTION PRN
Status: CANCELLED | OUTPATIENT
Start: 2021-04-13

## 2021-04-13 RX ORDER — SODIUM CHLORIDE 0.9 % (FLUSH) 0.9 %
10 SYRINGE (ML) INJECTION PRN
Status: DISCONTINUED | OUTPATIENT
Start: 2021-04-13 | End: 2021-04-14 | Stop reason: HOSPADM

## 2021-04-13 RX ORDER — SODIUM CHLORIDE 0.9 % (FLUSH) 0.9 %
10 SYRINGE (ML) INJECTION PRN
Status: CANCELLED | OUTPATIENT
Start: 2021-04-13

## 2021-04-13 RX ORDER — HEPARIN SODIUM (PORCINE) LOCK FLUSH IV SOLN 100 UNIT/ML 100 UNIT/ML
500 SOLUTION INTRAVENOUS PRN
Status: CANCELLED | OUTPATIENT
Start: 2021-04-13

## 2021-04-13 RX ORDER — SODIUM CHLORIDE 9 MG/ML
INJECTION, SOLUTION INTRAVENOUS PRN
Status: COMPLETED | OUTPATIENT
Start: 2021-04-13 | End: 2021-04-13

## 2021-04-13 RX ADMIN — SODIUM CHLORIDE: 9 INJECTION, SOLUTION INTRAVENOUS at 08:27

## 2021-04-13 RX ADMIN — SODIUM CHLORIDE, PRESERVATIVE FREE 10 ML: 5 INJECTION INTRAVENOUS at 08:27

## 2021-04-13 RX ADMIN — SODIUM CHLORIDE, PRESERVATIVE FREE 10 ML: 5 INJECTION INTRAVENOUS at 12:10

## 2021-04-13 RX ADMIN — Medication 500 UNITS: at 12:10

## 2021-04-15 ENCOUNTER — HOSPITAL ENCOUNTER (OUTPATIENT)
Dept: INTERVENTIONAL RADIOLOGY/VASCULAR | Age: 56
Discharge: HOME | End: 2021-04-17
Payer: COMMERCIAL

## 2021-04-15 VITALS
WEIGHT: 155 LBS | RESPIRATION RATE: 18 BRPM | BODY MASS INDEX: 25.83 KG/M2 | SYSTOLIC BLOOD PRESSURE: 135 MMHG | DIASTOLIC BLOOD PRESSURE: 63 MMHG | HEART RATE: 76 BPM | OXYGEN SATURATION: 95 % | HEIGHT: 65 IN

## 2021-04-15 PROCEDURE — 7100000011 HC PHASE II RECOVERY - ADDTL 15 MIN

## 2021-04-15 PROCEDURE — C1769 GUIDE WIRE: HCPCS

## 2021-04-15 PROCEDURE — 7100000010 HC PHASE II RECOVERY - FIRST 15 MIN

## 2021-04-15 PROCEDURE — 2709999900 HC NON-CHARGEABLE SUPPLY

## 2021-04-15 NOTE — H&P
History and Physical    Pt Name: Sharri Horvath  MRN: [id number]  YOB: 1965  Date of evaluation: 4/15/2021    SUBJECTIVE:   History of Chief Complaint:    Patient presents preprocedure for removal of drain. She has a history of recurrent ovarian cancer. She has been undergoing chemotherapy treatment. Patient says that there has not been much drainage from the drain more recently. She has been scheduled for removal of the drain. Past Medical History    has a past medical history of Anemia, Bleeding, Cervical cancer ([facility] [address] Depression, Diabetes mellitus ([address] GERD (gastroesophageal reflux disease), Metastatic cancer ([facility] [address] Ovarian cancer ([facility] [address] Post chemo evaluation, and Splenic lesion. Past Surgical History   has a past surgical history that includes Hysterectomy, total abdominal; Port Surgery; Tonsillectomy; IR PORT PLACEMENT > 5 YEARS (08/24/2020); Anus surgery; Abscess Drainage (2013); colectomy (03/2013); IR EMBOLIZATION HEMORRHAGE (10/05/2020); and Cardiac catheterization. Medications  Prior to Admission medications    Medication Sig Start Date End Date Taking? Authorizing Provider   oxyCODONE-acetaminophen (PERCOCET) 5-325 MG per tablet Take 1 tablet by mouth every 4 hours as needed for Pain (breakthrough pain) for up to 30 days. 4/9/21 5/9/21  Negro Marshall MD   morphine (MS CONTIN) 15 MG extended release tablet Take 1 tablet by mouth 2 times daily for 30 days. 4/9/21 5/9/21  Negro Marshall MD   morphine (MS CONTIN) 30 MG extended release tablet Take 1 tablet by mouth 2 times daily for 30 days.  4/9/21 5/9/21  Negro Marshall MD   pantoprazole (PROTONIX) 40 MG tablet Take 1 tablet by mouth 2 times daily 4/8/21   Negro Marshall MD   levETIRAcetam (KEPPRA) 750 MG tablet Take 1 tablet by mouth 2 times daily 3/26/21   Negro Marshall MD   ferrous sulfate (FE TABS 325) 325 (65 Fe) MG EC tablet Take 1 tablet by mouth daily (with breakfast) 3/12/21   ESTRELLITA Galvan - NP metFORMIN (GLUCOPHAGE-XR) 500 MG extended release tablet Take 2 tablets by mouth twice daily 2/26/21   Ainsley Batista MD   sertraline (ZOLOFT) 100 MG tablet Take 1 tablet by mouth daily 2/5/21 4/9/21  Ainsley Batista MD   metoprolol succinate (TOPROL XL) 25 MG extended release tablet Take 1 tablet by mouth daily 2/5/21   Ainsley Batista MD   aspirin 81 MG chewable tablet Take 81 mg by mouth nightly Pt not taking    Historical Provider, MD     Allergies  is allergic to ceftriaxone. Family History  family history includes Alcohol Abuse in her mother; Cirrhosis in her mother. Social History   reports that she has been smoking cigarettes. She has been smoking about 1.00 pack per day. She uses smokeless tobacco.  1 ppd for 40 years. reports previous alcohol use. reports no history of drug use. Marital Status single  Occupation none    OBJECTIVE:   VITALS:  height is 5' 5\" (1.651 m) and weight is 155 lb (70.3 kg). Her blood pressure is 124/68 and her pulse is 83. Her respiration is 18 and oxygen saturation is 96%. CONSTITUTIONAL:alert & oriented x 3, no acute distress. Pleasant. Thin. SKIN:  Warm and dry, no rashes on exposed areas of skin. Somewhat pale. HEAD:  Normocephalic, atraumatic   EYES: PERRL. EOMs intact. Wearing glasses. EARS:  Hearing grossly WNL. NOSE:  Nares patent. No rhinorrhea. MOUTH/THROAT:  benign  NECK:supple, no lymphadenopathy  LUNGS: Clear to auscultation bilaterally, no wheezes. CARDIOVASCULAR: Heart sounds are normal.  Regular rate and rhythm without murmur. ABDOMEN: soft, non tender, non distended. Drain present. EXTREMITIES: no edema bilateral lower extremities.     IMPRESSIONS:   1. drain present, recurrent ovarian cancer  2.  has a past medical history of Anemia, Bleeding (10/2020), Cervical cancer ([facility] [address] Depression, Diabetes mellitus ([facility] [address] GERD (gastroesophageal reflux disease), Metastatic cancer ([facility] [address] (10/2020), Ovarian cancer ([facility] [address] Post chemo evaluation, and Splenic lesion. PLANS:   1.  Removal of drain    VENANCIO CARTER PA-C  Electronically signed 4/15/2021 at 7:43 AM

## 2021-04-15 NOTE — PROGRESS NOTES
Drain removed  Per dr Gabrielle Muhammad site with dressing with opsite  Patient remains on monitors resting quiet denies pain

## 2021-04-16 ENCOUNTER — HOSPITAL ENCOUNTER (OUTPATIENT)
Dept: INFUSION THERAPY | Age: 56
Discharge: HOME | End: 2021-04-16
Payer: COMMERCIAL

## 2021-04-16 VITALS
TEMPERATURE: 98.2 F | WEIGHT: 160.2 LBS | RESPIRATION RATE: 16 BRPM | SYSTOLIC BLOOD PRESSURE: 128 MMHG | HEART RATE: 86 BPM | BODY MASS INDEX: 26.69 KG/M2 | HEIGHT: 65 IN | DIASTOLIC BLOOD PRESSURE: 70 MMHG

## 2021-04-16 DIAGNOSIS — C79.51 CANCER, METASTATIC TO BONE (HCC): ICD-10-CM

## 2021-04-16 DIAGNOSIS — C56.9 MALIGNANT NEOPLASM OF OVARY, UNSPECIFIED LATERALITY (HCC): Primary | ICD-10-CM

## 2021-04-16 DIAGNOSIS — Z85.43 HISTORY OF MALIGNANT NEOPLASM OF OVARY: ICD-10-CM

## 2021-04-16 LAB
ABO/RH: NORMAL
ABSOLUTE EOS #: 0.05 K/UL (ref 0–0.4)
ABSOLUTE IMMATURE GRANULOCYTE: ABNORMAL K/UL (ref 0–0.3)
ABSOLUTE LYMPH #: 1.3 K/UL (ref 1–4.8)
ABSOLUTE MONO #: 0.53 K/UL (ref 0.1–0.8)
ALBUMIN SERPL-MCNC: 3.9 G/DL (ref 3.5–5.2)
ALBUMIN/GLOBULIN RATIO: 1.6 (ref 1–2.5)
ALP BLD-CCNC: 60 U/L (ref 35–104)
ALT SERPL-CCNC: 5 U/L (ref 5–33)
ANION GAP SERPL CALCULATED.3IONS-SCNC: 9 MMOL/L (ref 9–17)
ANTIBODY IDENTIFICATION: NORMAL
ANTIBODY SCREEN: POSITIVE
ARM BAND NUMBER: NORMAL
AST SERPL-CCNC: 13 U/L
BASOPHILS # BLD: 1 % (ref 0–2)
BASOPHILS ABSOLUTE: 0.05 K/UL (ref 0–0.2)
BILIRUB SERPL-MCNC: 0.19 MG/DL (ref 0.3–1.2)
BLD PROD TYP BPU: NORMAL
BUN BLDV-MCNC: 8 MG/DL (ref 6–20)
BUN/CREAT BLD: ABNORMAL (ref 9–20)
CALCIUM SERPL-MCNC: 8.6 MG/DL (ref 8.6–10.4)
CHLORIDE BLD-SCNC: 102 MMOL/L (ref 98–107)
CO2: 26 MMOL/L (ref 20–31)
CREAT SERPL-MCNC: <0.4 MG/DL (ref 0.5–0.9)
CROSSMATCH RESULT: NORMAL
DAT IGG: POSITIVE
DIFFERENTIAL TYPE: ABNORMAL
DISPENSE STATUS BLOOD BANK: NORMAL
EOSINOPHILS RELATIVE PERCENT: 1 % (ref 1–4)
EXPIRATION DATE: NORMAL
GFR AFRICAN AMERICAN: ABNORMAL ML/MIN
GFR NON-AFRICAN AMERICAN: ABNORMAL ML/MIN
GFR SERPL CREATININE-BSD FRML MDRD: ABNORMAL ML/MIN/{1.73_M2}
GFR SERPL CREATININE-BSD FRML MDRD: ABNORMAL ML/MIN/{1.73_M2}
GLUCOSE BLD-MCNC: 134 MG/DL (ref 70–99)
HCT VFR BLD CALC: 26.4 % (ref 36–46)
HEMOGLOBIN: 8.2 G/DL (ref 12–16)
IMMATURE GRANULOCYTES: ABNORMAL %
LYMPHOCYTES # BLD: 27 % (ref 24–44)
MCH RBC QN AUTO: 24.3 PG (ref 26–34)
MCHC RBC AUTO-ENTMCNC: 31 G/DL (ref 31–37)
MCV RBC AUTO: 78.4 FL (ref 80–100)
MONOCYTES # BLD: 11 % (ref 1–7)
MORPHOLOGY: ABNORMAL
NRBC AUTOMATED: ABNORMAL PER 100 WBC
PDW BLD-RTO: 24.9 % (ref 12.5–15.4)
PLATELET # BLD: 765 K/UL (ref 140–450)
PLATELET ESTIMATE: ABNORMAL
PMV BLD AUTO: 6.5 FL (ref 6–12)
POTASSIUM SERPL-SCNC: 3.7 MMOL/L (ref 3.7–5.3)
RBC # BLD: 3.37 M/UL (ref 4–5.2)
RBC # BLD: ABNORMAL 10*6/UL
SEG NEUTROPHILS: 60 % (ref 36–66)
SEGMENTED NEUTROPHILS ABSOLUTE COUNT: 2.87 K/UL (ref 1.8–7.7)
SODIUM BLD-SCNC: 137 MMOL/L (ref 135–144)
TOTAL PROTEIN: 6.4 G/DL (ref 6.4–8.3)
TRANSFUSION STATUS: NORMAL
UNIT DIVISION: 0
UNIT NUMBER: NORMAL
WBC # BLD: 4.8 K/UL (ref 3.5–11)
WBC # BLD: ABNORMAL 10*3/UL

## 2021-04-16 PROCEDURE — 96413 CHEMO IV INFUSION 1 HR: CPT

## 2021-04-16 PROCEDURE — 96375 TX/PRO/DX INJ NEW DRUG ADDON: CPT

## 2021-04-16 PROCEDURE — 85025 COMPLETE CBC W/AUTO DIFF WBC: CPT

## 2021-04-16 PROCEDURE — 6360000002 HC RX W HCPCS: Performed by: INTERNAL MEDICINE

## 2021-04-16 PROCEDURE — 2580000003 HC RX 258: Performed by: INTERNAL MEDICINE

## 2021-04-16 PROCEDURE — 80053 COMPREHEN METABOLIC PANEL: CPT

## 2021-04-16 RX ORDER — SODIUM CHLORIDE 0.9 % (FLUSH) 0.9 %
5 SYRINGE (ML) INJECTION PRN
Status: CANCELLED | OUTPATIENT
Start: 2021-04-16

## 2021-04-16 RX ORDER — SODIUM CHLORIDE 9 MG/ML
20 INJECTION, SOLUTION INTRAVENOUS ONCE
Status: COMPLETED | OUTPATIENT
Start: 2021-04-16 | End: 2021-04-16

## 2021-04-16 RX ORDER — SODIUM CHLORIDE 0.9 % (FLUSH) 0.9 %
10 SYRINGE (ML) INJECTION PRN
Status: DISCONTINUED | OUTPATIENT
Start: 2021-04-16 | End: 2021-04-17 | Stop reason: HOSPADM

## 2021-04-16 RX ORDER — EPINEPHRINE 1 MG/ML
0.3 INJECTION, SOLUTION, CONCENTRATE INTRAVENOUS PRN
Status: CANCELLED | OUTPATIENT
Start: 2021-04-16

## 2021-04-16 RX ORDER — DEXAMETHASONE SODIUM PHOSPHATE 4 MG/ML
8 INJECTION, SOLUTION INTRA-ARTICULAR; INTRALESIONAL; INTRAMUSCULAR; INTRAVENOUS; SOFT TISSUE ONCE
Status: COMPLETED | OUTPATIENT
Start: 2021-04-16 | End: 2021-04-16

## 2021-04-16 RX ORDER — METHYLPREDNISOLONE SODIUM SUCCINATE 125 MG/2ML
125 INJECTION, POWDER, LYOPHILIZED, FOR SOLUTION INTRAMUSCULAR; INTRAVENOUS ONCE
Status: CANCELLED | OUTPATIENT
Start: 2021-04-16 | End: 2021-04-16

## 2021-04-16 RX ORDER — SODIUM CHLORIDE 9 MG/ML
INJECTION, SOLUTION INTRAVENOUS CONTINUOUS
Status: CANCELLED | OUTPATIENT
Start: 2021-04-16

## 2021-04-16 RX ORDER — DIPHENHYDRAMINE HYDROCHLORIDE 50 MG/ML
50 INJECTION INTRAMUSCULAR; INTRAVENOUS ONCE
Status: CANCELLED | OUTPATIENT
Start: 2021-04-16 | End: 2021-04-16

## 2021-04-16 RX ORDER — HEPARIN SODIUM (PORCINE) LOCK FLUSH IV SOLN 100 UNIT/ML 100 UNIT/ML
500 SOLUTION INTRAVENOUS PRN
Status: DISCONTINUED | OUTPATIENT
Start: 2021-04-16 | End: 2021-04-17 | Stop reason: HOSPADM

## 2021-04-16 RX ADMIN — SODIUM CHLORIDE, PRESERVATIVE FREE 10 ML: 5 INJECTION INTRAVENOUS at 12:13

## 2021-04-16 RX ADMIN — DEXAMETHASONE SODIUM PHOSPHATE 8 MG: 4 INJECTION, SOLUTION INTRAMUSCULAR; INTRAVENOUS at 11:08

## 2021-04-16 RX ADMIN — HEPARIN 500 UNITS: 100 SYRINGE at 12:13

## 2021-04-16 RX ADMIN — SODIUM CHLORIDE 20 ML/HR: 9 INJECTION, SOLUTION INTRAVENOUS at 11:08

## 2021-04-16 RX ADMIN — GEMCITABINE 1800 MG: 38 INJECTION, SOLUTION INTRAVENOUS at 11:34

## 2021-04-16 NOTE — FLOWSHEET NOTE
Writer encountered and visited briefly with Patient and Son while rounding the infusion clinic. Pt was looking at her phone. She smiled and reported that she was doing \"fine. \"     Serafin Trotter offered words of support and encouragement to Patient and Son.       04/16/21 1246   Encounter Summary   Services provided to: Patient and family together   Referral/Consult From: [address] [facility] Children;Family members   Continue Visiting   (4/16/21)   Complexity of Encounter Low   Length of Encounter 15 minutes   Routine   Type Follow up   Assessment Calm; Approachable;Coping   Intervention Sustaining presence/ Ministry of presence   Outcome Expressed gratitude     Electronically signed by Gray Taylor, Oncology Outpatient [address], [address] [facility] Radiation Oncology  4/16/2021  12:47 PM

## 2021-04-16 NOTE — PROGRESS NOTES
Pt here for C.3D8 Gemzar. Arrives ambulatory by self. Denies any new complaints. Labs drawn from port, results reviewed. Tx complete without incident. Pt d/c'd in stable condition. Returns 4/30/21 for follow up with Dr April Ac and C.4D1 Tahir.

## 2021-04-27 ENCOUNTER — TELEPHONE (OUTPATIENT)
Dept: ONCOLOGY | Age: 56
End: 2021-04-27

## 2021-04-27 NOTE — TELEPHONE ENCOUNTER
RECEIVED PHONE CALL FROM [facility]  THEY ARE CLARIFYING A KEPPRA SCRIPT FROM 03/26/21. PT STATES SHE TAKES 2 TABS TWICE PER DAY AND PREVIOUS SCRIPTS INDICATE THIS AS WELL. ON CURRENT SCRIPT #120 TAB FOR 30 DAY SUPPLY WAS WRITTEN HOWEVER IT STATES 1 PER DAY?     PLEASE CLARIFY

## 2021-04-27 NOTE — TELEPHONE ENCOUNTER
REVIEWED WITH DR Vasile Brand. HE POINTS OUT PT FOLLOWS IN [place]. HE IS THINKING HE ORDERED AS A COURTESY AND SHOULD HAVE HER NEUROLOGIST WRITE SCRIPT. SPOKE [facility] . SCRIPT WAS DISPENSED FOR #120. SHE WILL FLAG FOR FUTURE REFILLS TO GO TO NEUROLOGIST.

## 2021-04-30 ENCOUNTER — HOSPITAL ENCOUNTER (OUTPATIENT)
Dept: INFUSION THERAPY | Age: 56
End: 2021-04-30
Payer: COMMERCIAL

## 2021-05-03 RX ORDER — LEVETIRACETAM 750 MG/1
1500 TABLET ORAL 2 TIMES DAILY
Qty: 240 TABLET | Refills: 2 | Status: SHIPPED | OUTPATIENT
Start: 2021-05-03 | End: 2021-07-12 | Stop reason: SDUPTHER

## 2021-05-07 ENCOUNTER — HOSPITAL ENCOUNTER (OUTPATIENT)
Dept: INFUSION THERAPY | Age: 56
Discharge: HOME | End: 2021-05-07
Payer: COMMERCIAL

## 2021-05-07 ENCOUNTER — OFFICE VISIT (OUTPATIENT)
Dept: ONCOLOGY | Age: 56
End: 2021-05-07
Payer: COMMERCIAL

## 2021-05-07 ENCOUNTER — TELEPHONE (OUTPATIENT)
Dept: ONCOLOGY | Age: 56
End: 2021-05-07

## 2021-05-07 VITALS
HEART RATE: 91 BPM | DIASTOLIC BLOOD PRESSURE: 69 MMHG | BODY MASS INDEX: 26.31 KG/M2 | WEIGHT: 158.1 LBS | SYSTOLIC BLOOD PRESSURE: 106 MMHG | TEMPERATURE: 98.2 F

## 2021-05-07 DIAGNOSIS — C79.51 CANCER, METASTATIC TO BONE (HCC): ICD-10-CM

## 2021-05-07 DIAGNOSIS — C56.9 MALIGNANT NEOPLASM OF OVARY, UNSPECIFIED LATERALITY (HCC): Primary | ICD-10-CM

## 2021-05-07 DIAGNOSIS — T45.1X5A ANTINEOPLASTIC CHEMOTHERAPY INDUCED ANEMIA: ICD-10-CM

## 2021-05-07 DIAGNOSIS — Z85.43 HISTORY OF MALIGNANT NEOPLASM OF OVARY: ICD-10-CM

## 2021-05-07 DIAGNOSIS — D64.81 ANTINEOPLASTIC CHEMOTHERAPY INDUCED ANEMIA: ICD-10-CM

## 2021-05-07 LAB
ABSOLUTE EOS #: 0.3 K/UL (ref 0–0.4)
ABSOLUTE IMMATURE GRANULOCYTE: ABNORMAL K/UL (ref 0–0.3)
ABSOLUTE LYMPH #: 2.73 K/UL (ref 1–4.8)
ABSOLUTE MONO #: 1.01 K/UL (ref 0.1–1.2)
ALBUMIN SERPL-MCNC: 4 G/DL (ref 3.5–5.2)
ALBUMIN/GLOBULIN RATIO: 1.5 (ref 1–2.5)
ALP BLD-CCNC: 51 U/L (ref 35–104)
ALT SERPL-CCNC: <5 U/L (ref 5–33)
ANION GAP SERPL CALCULATED.3IONS-SCNC: 11 MMOL/L (ref 9–17)
AST SERPL-CCNC: 10 U/L
BASOPHILS # BLD: 2 % (ref 0–2)
BASOPHILS ABSOLUTE: 0.2 K/UL (ref 0–0.2)
BILIRUB SERPL-MCNC: 0.1 MG/DL (ref 0.3–1.2)
BUN BLDV-MCNC: 11 MG/DL (ref 6–20)
BUN/CREAT BLD: ABNORMAL (ref 9–20)
CA 125: 307 U/ML
CALCIUM SERPL-MCNC: 8.8 MG/DL (ref 8.6–10.4)
CHLORIDE BLD-SCNC: 101 MMOL/L (ref 98–107)
CO2: 26 MMOL/L (ref 20–31)
CREAT SERPL-MCNC: <0.4 MG/DL (ref 0.5–0.9)
DIFFERENTIAL TYPE: ABNORMAL
EOSINOPHILS RELATIVE PERCENT: 3 % (ref 1–4)
GFR AFRICAN AMERICAN: ABNORMAL ML/MIN
GFR NON-AFRICAN AMERICAN: ABNORMAL ML/MIN
GFR SERPL CREATININE-BSD FRML MDRD: ABNORMAL ML/MIN/{1.73_M2}
GFR SERPL CREATININE-BSD FRML MDRD: ABNORMAL ML/MIN/{1.73_M2}
GLUCOSE BLD-MCNC: 95 MG/DL (ref 70–99)
HCT VFR BLD CALC: 20.8 % (ref 36–46)
HEMOGLOBIN: 6 G/DL (ref 12–16)
IMMATURE GRANULOCYTES: ABNORMAL %
LYMPHOCYTES # BLD: 27 % (ref 24–44)
MCH RBC QN AUTO: 21.1 PG (ref 26–34)
MCHC RBC AUTO-ENTMCNC: 28.9 G/DL (ref 31–37)
MCV RBC AUTO: 72.8 FL (ref 80–100)
MONOCYTES # BLD: 10 % (ref 2–11)
MORPHOLOGY: ABNORMAL
NRBC AUTOMATED: ABNORMAL PER 100 WBC
PDW BLD-RTO: 27.8 % (ref 12.5–15.4)
PLATELET # BLD: 1211 K/UL (ref 140–450)
PLATELET ESTIMATE: ABNORMAL
PMV BLD AUTO: 6.6 FL (ref 6–12)
POTASSIUM SERPL-SCNC: 4.1 MMOL/L (ref 3.7–5.3)
PROTEIN UA: NEGATIVE
RBC # BLD: 2.86 M/UL (ref 4–5.2)
RBC # BLD: ABNORMAL 10*6/UL
SEG NEUTROPHILS: 58 % (ref 36–66)
SEGMENTED NEUTROPHILS ABSOLUTE COUNT: 5.86 K/UL (ref 1.8–7.7)
SODIUM BLD-SCNC: 138 MMOL/L (ref 135–144)
TOTAL PROTEIN: 6.6 G/DL (ref 6.4–8.3)
WBC # BLD: 10.1 K/UL (ref 3.5–11)
WBC # BLD: ABNORMAL 10*3/UL

## 2021-05-07 PROCEDURE — 36591 DRAW BLOOD OFF VENOUS DEVICE: CPT

## 2021-05-07 PROCEDURE — 86900 BLOOD TYPING SEROLOGIC ABO: CPT

## 2021-05-07 PROCEDURE — G8427 DOCREV CUR MEDS BY ELIG CLIN: HCPCS | Performed by: INTERNAL MEDICINE

## 2021-05-07 PROCEDURE — 2580000003 HC RX 258: Performed by: INTERNAL MEDICINE

## 2021-05-07 PROCEDURE — 86901 BLOOD TYPING SEROLOGIC RH(D): CPT

## 2021-05-07 PROCEDURE — 6360000002 HC RX W HCPCS: Performed by: INTERNAL MEDICINE

## 2021-05-07 PROCEDURE — 86880 COOMBS TEST DIRECT: CPT

## 2021-05-07 PROCEDURE — 99214 OFFICE O/P EST MOD 30 MIN: CPT | Performed by: INTERNAL MEDICINE

## 2021-05-07 PROCEDURE — 4004F PT TOBACCO SCREEN RCVD TLK: CPT | Performed by: INTERNAL MEDICINE

## 2021-05-07 PROCEDURE — 86920 COMPATIBILITY TEST SPIN: CPT

## 2021-05-07 PROCEDURE — 96413 CHEMO IV INFUSION 1 HR: CPT

## 2021-05-07 PROCEDURE — 80053 COMPREHEN METABOLIC PANEL: CPT

## 2021-05-07 PROCEDURE — P9016 RBC LEUKOCYTES REDUCED: HCPCS

## 2021-05-07 PROCEDURE — 81003 URINALYSIS AUTO W/O SCOPE: CPT

## 2021-05-07 PROCEDURE — 86850 RBC ANTIBODY SCREEN: CPT

## 2021-05-07 PROCEDURE — 85025 COMPLETE CBC W/AUTO DIFF WBC: CPT

## 2021-05-07 PROCEDURE — 99211 OFF/OP EST MAY X REQ PHY/QHP: CPT | Performed by: INTERNAL MEDICINE

## 2021-05-07 PROCEDURE — G8417 CALC BMI ABV UP PARAM F/U: HCPCS | Performed by: INTERNAL MEDICINE

## 2021-05-07 PROCEDURE — 86902 BLOOD TYPE ANTIGEN DONOR EA: CPT

## 2021-05-07 PROCEDURE — 36430 TRANSFUSION BLD/BLD COMPNT: CPT

## 2021-05-07 PROCEDURE — 96375 TX/PRO/DX INJ NEW DRUG ADDON: CPT

## 2021-05-07 PROCEDURE — 86304 IMMUNOASSAY TUMOR CA 125: CPT

## 2021-05-07 PROCEDURE — 86870 RBC ANTIBODY IDENTIFICATION: CPT

## 2021-05-07 PROCEDURE — 3017F COLORECTAL CA SCREEN DOC REV: CPT | Performed by: INTERNAL MEDICINE

## 2021-05-07 RX ORDER — SODIUM CHLORIDE 9 MG/ML
20 INJECTION, SOLUTION INTRAVENOUS ONCE
Status: CANCELLED | OUTPATIENT
Start: 2021-05-14 | End: 2021-05-07

## 2021-05-07 RX ORDER — SODIUM CHLORIDE 9 MG/ML
INJECTION, SOLUTION INTRAVENOUS CONTINUOUS
Status: CANCELLED | OUTPATIENT
Start: 2021-05-07

## 2021-05-07 RX ORDER — SODIUM CHLORIDE 9 MG/ML
20 INJECTION, SOLUTION INTRAVENOUS ONCE
Status: CANCELLED | OUTPATIENT
Start: 2021-05-07 | End: 2021-05-07

## 2021-05-07 RX ORDER — METHYLPREDNISOLONE SODIUM SUCCINATE 125 MG/2ML
125 INJECTION, POWDER, LYOPHILIZED, FOR SOLUTION INTRAMUSCULAR; INTRAVENOUS ONCE
Status: CANCELLED | OUTPATIENT
Start: 2021-05-14 | End: 2021-05-07

## 2021-05-07 RX ORDER — SODIUM CHLORIDE 9 MG/ML
20 INJECTION, SOLUTION INTRAVENOUS ONCE
Status: DISCONTINUED | OUTPATIENT
Start: 2021-05-07 | End: 2021-05-08 | Stop reason: HOSPADM

## 2021-05-07 RX ORDER — HEPARIN SODIUM (PORCINE) LOCK FLUSH IV SOLN 100 UNIT/ML 100 UNIT/ML
500 SOLUTION INTRAVENOUS PRN
Status: CANCELLED | OUTPATIENT
Start: 2021-05-14

## 2021-05-07 RX ORDER — EPINEPHRINE 1 MG/ML
0.3 INJECTION, SOLUTION, CONCENTRATE INTRAVENOUS PRN
Status: CANCELLED | OUTPATIENT
Start: 2021-05-14

## 2021-05-07 RX ORDER — SODIUM CHLORIDE 0.9 % (FLUSH) 0.9 %
5 SYRINGE (ML) INJECTION PRN
Status: CANCELLED | OUTPATIENT
Start: 2021-05-14

## 2021-05-07 RX ORDER — SODIUM CHLORIDE 0.9 % (FLUSH) 0.9 %
10 SYRINGE (ML) INJECTION PRN
Status: CANCELLED | OUTPATIENT
Start: 2021-05-14

## 2021-05-07 RX ORDER — SODIUM CHLORIDE 0.9 % (FLUSH) 0.9 %
5 SYRINGE (ML) INJECTION PRN
Status: CANCELLED | OUTPATIENT
Start: 2021-05-07

## 2021-05-07 RX ORDER — HEPARIN SODIUM (PORCINE) LOCK FLUSH IV SOLN 100 UNIT/ML 100 UNIT/ML
500 SOLUTION INTRAVENOUS PRN
Status: DISCONTINUED | OUTPATIENT
Start: 2021-05-07 | End: 2021-05-08 | Stop reason: HOSPADM

## 2021-05-07 RX ORDER — DIPHENHYDRAMINE HYDROCHLORIDE 50 MG/ML
50 INJECTION INTRAMUSCULAR; INTRAVENOUS ONCE
Status: CANCELLED | OUTPATIENT
Start: 2021-05-07 | End: 2021-05-07

## 2021-05-07 RX ORDER — SODIUM CHLORIDE 0.9 % (FLUSH) 0.9 %
10 SYRINGE (ML) INJECTION PRN
Status: CANCELLED | OUTPATIENT
Start: 2021-05-07

## 2021-05-07 RX ORDER — EPINEPHRINE 1 MG/ML
0.3 INJECTION, SOLUTION, CONCENTRATE INTRAVENOUS PRN
Status: CANCELLED | OUTPATIENT
Start: 2021-05-07

## 2021-05-07 RX ORDER — SODIUM CHLORIDE 9 MG/ML
INJECTION, SOLUTION INTRAVENOUS PRN
Status: DISCONTINUED | OUTPATIENT
Start: 2021-05-07 | End: 2021-05-07 | Stop reason: HOSPADM

## 2021-05-07 RX ORDER — DEXAMETHASONE SODIUM PHOSPHATE 4 MG/ML
8 INJECTION, SOLUTION INTRA-ARTICULAR; INTRALESIONAL; INTRAMUSCULAR; INTRAVENOUS; SOFT TISSUE ONCE
Status: COMPLETED | OUTPATIENT
Start: 2021-05-07 | End: 2021-05-07

## 2021-05-07 RX ORDER — METHYLPREDNISOLONE SODIUM SUCCINATE 125 MG/2ML
125 INJECTION, POWDER, LYOPHILIZED, FOR SOLUTION INTRAMUSCULAR; INTRAVENOUS ONCE
Status: CANCELLED | OUTPATIENT
Start: 2021-05-07 | End: 2021-05-07

## 2021-05-07 RX ORDER — DIPHENHYDRAMINE HYDROCHLORIDE 50 MG/ML
50 INJECTION INTRAMUSCULAR; INTRAVENOUS ONCE
Status: CANCELLED | OUTPATIENT
Start: 2021-05-14 | End: 2021-05-07

## 2021-05-07 RX ORDER — SODIUM CHLORIDE 0.9 % (FLUSH) 0.9 %
10 SYRINGE (ML) INJECTION PRN
Status: DISCONTINUED | OUTPATIENT
Start: 2021-05-07 | End: 2021-05-08 | Stop reason: HOSPADM

## 2021-05-07 RX ORDER — SODIUM CHLORIDE 9 MG/ML
INJECTION, SOLUTION INTRAVENOUS PRN
Status: CANCELLED | OUTPATIENT
Start: 2021-05-07

## 2021-05-07 RX ORDER — SODIUM CHLORIDE 9 MG/ML
INJECTION, SOLUTION INTRAVENOUS PRN
Status: COMPLETED | OUTPATIENT
Start: 2021-05-07 | End: 2021-05-07

## 2021-05-07 RX ORDER — HEPARIN SODIUM (PORCINE) LOCK FLUSH IV SOLN 100 UNIT/ML 100 UNIT/ML
500 SOLUTION INTRAVENOUS PRN
Status: CANCELLED | OUTPATIENT
Start: 2021-05-07

## 2021-05-07 RX ORDER — SODIUM CHLORIDE 9 MG/ML
INJECTION, SOLUTION INTRAVENOUS CONTINUOUS
Status: CANCELLED | OUTPATIENT
Start: 2021-05-14

## 2021-05-07 RX ADMIN — GEMCITABINE HYDROCHLORIDE 1800 MG: 200 INJECTION, SOLUTION INTRAVENOUS at 10:22

## 2021-05-07 RX ADMIN — DEXAMETHASONE SODIUM PHOSPHATE 8 MG: 4 INJECTION, SOLUTION INTRAMUSCULAR; INTRAVENOUS at 09:45

## 2021-05-07 RX ADMIN — SODIUM CHLORIDE, PRESERVATIVE FREE 10 ML: 5 INJECTION INTRAVENOUS at 09:13

## 2021-05-07 RX ADMIN — SODIUM CHLORIDE: 9 INJECTION, SOLUTION INTRAVENOUS at 09:13

## 2021-05-07 RX ADMIN — HEPARIN 500 UNITS: 100 SYRINGE at 10:56

## 2021-05-07 RX ADMIN — SODIUM CHLORIDE, PRESERVATIVE FREE 10 ML: 5 INJECTION INTRAVENOUS at 10:56

## 2021-05-07 NOTE — PROGRESS NOTES
Patient called and notified of blood transfusion scheduled tomorrow 5/8 10 am at [facility], she was told to keep her green blood band on, skip registration and go right to [address] , she verbalized understanding   Mariam Larry RN

## 2021-05-07 NOTE — TELEPHONE ENCOUNTER
AVS from 5/7/     Chemo today  Blood transfusion if Hb below 7  RV 2-3 weeks     Chemo today  Transfusion today  rv scheduled for 5/28/21 @ 8am with chemo to follow    Pt was given AVS and appt schedule

## 2021-05-09 LAB
ABO/RH: NORMAL
ANTIBODY IDENTIFICATION: NORMAL
ANTIBODY SCREEN: POSITIVE
ARM BAND NUMBER: NORMAL
BLD PROD TYP BPU: NORMAL
BLD PROD TYP BPU: NORMAL
CROSSMATCH RESULT: NORMAL
CROSSMATCH RESULT: NORMAL
DAT IGG: NEGATIVE
DISPENSE STATUS BLOOD BANK: NORMAL
DISPENSE STATUS BLOOD BANK: NORMAL
EXPIRATION DATE: NORMAL
TRANSFUSION STATUS: NORMAL
TRANSFUSION STATUS: NORMAL
UNIT DIVISION: 0
UNIT DIVISION: 0
UNIT NUMBER: NORMAL
UNIT NUMBER: NORMAL

## 2021-05-09 NOTE — PROGRESS NOTES
_      Chief Complaint   Patient presents with    Follow-up     review status of disease     DIAGNOSIS:       Recurrent ovarian cancer. Original diagnosis 2005 with multiple recurrences. Diffuse metastasis. CURRENT THERAPY:         Doxil/ Avastin started 9/18/2020. Avastin was discontinued due to recent GI bleeding. Status post recent vascular embolization  S/p seizure disorder. Gemzar started 2/26/2021. Interrupted due to recent hospitalization    BRIEF CASE HISTORY:      Ms. Jaswinder Sandoval is a very pleasant 60 y.o. female with history of multiple co morbidities as listed. The patient seen in consultation for ovarian cancer with multiple recurrences. 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 She had surgical debulking and she had systemic treatment with Taxol and carboplatin for 6 cycles. Patient was in remission for about 2 years. She had a relapse in 2007 and treated with topotecan with good results. Patient relapsed again in 2008 and was treated with Taxol carboplatin. After 3 cycles of systemic chemotherapy she had problems with carboplatin so she finished 3 more cycles with Taxol. She did well again for 2 to 3 years until she had a relapse in 2012 and she had intraperitoneal chemotherapy treatment with Taxol. Patient was last seen by her oncologist in 2014 at which time she had relatively stable disease with normal tumor marker and no significant abnormal images. Patient moved to [place] after that and she was lost for oncology follow-ups. Patient was recently evaluated again here in [place] because of abdominal discomfort. Re imaging confirmed metastatic relapse. Biopsy confirmed ovarian cancer recurrence. Scan showed extensive intraabdominal and splenic involvement and lung mets. She has no symptoms at the present time.    Patient denies smoking or alcohol drinking.l    INTERIM HISTORY:    patient is seen for follow up recurrent ovarian cancer. Patient received 1 cycle of palliative chemotherapy using Doxil/ Avastin. Patient was hospitalized at [facility] last week because of severe GI bleeding. She had multiple blood transfusions. She had GI and vascular evaluation. She had evidence of intra-abdominal mass at the splenic area with GI infiltration. Patient had embolization by vascular. Recently readmitted because of a seizure disorder. Images showed no brain mets. Currently seizures are controlled. Patient was hospitalized last week due to left leg DVT and anemia. She was treated accordingly. She received Eliquis. Tolerated well. Started on Gemzar. Tolerated well. She is having increasing weakness and fatigue. Hemoglobin is low. No clear active bleeding. No melena or hematochezia. No hematemesis. PAST MEDICAL HISTORY: has a past medical history of Anemia, Bleeding, Cervical cancer ([facility] [address] Depression, Diabetes mellitus ([facility] [address] GERD (gastroesophageal reflux disease), Hx of blood clots, Hypertension, Metastatic cancer ([facility] [address] Ovarian cancer ([facility] [address] Post chemo evaluation, and Splenic lesion. PAST SURGICAL HISTORY: has a past surgical history that includes Hysterectomy, total abdominal; Port Surgery; Tonsillectomy; IR PORT PLACEMENT > 5 YEARS (08/24/2020); Anus surgery; Abscess Drainage (2013); colectomy (03/2013); IR EMBOLIZATION HEMORRHAGE (10/05/2020); and Cardiac catheterization. CURRENT MEDICATIONS:  has a current medication list which includes the following prescription(s): levetiracetam, oxycodone-acetaminophen, morphine, morphine, pantoprazole, ferrous sulfate, metformin, sertraline, metoprolol succinate, and aspirin. ALLERGIES:  is allergic to ceftriaxone. FAMILY HISTORY: Negative for any hematological or oncological conditions. SOCIAL HISTORY:  reports that she has been smoking cigarettes.  She has been smoking about 1.00 pack per day. She uses smokeless tobacco. She reports previous alcohol use. She reports that she does not use drugs. REVIEW OF SYSTEMS:     · General: No weakness or fatigue. No unanticipated weight loss or decreased appetite. No fever or chills. · Eyes: No blurred vision, eye pain or double vision. · Ears: No hearing problems or drainage. No tinnitus. · Throat: No sore throat, problems with swallowing or dysphagia. · Respiratory: No cough, sputum or hemoptysis. No shortness of breath. No pleuritic chest pain. · Cardiovascular: No chest pain, orthopnea or PND. No lower extremity edema. No palpitation. · Gastrointestinal: As above. · Genitourinary: No dysuria, hematuria, frequency or urgency. · Musculoskeletal: No muscle aches or pains. No limitation of movement. No back pain. No gait disturbance, No joint complaints. · Dermatologic: No skin rashes or pruritus. No skin lesions or discolorations. · Psychiatric: No depression, anxiety, or stress or signs of schizophrenia. No change in mood or affect. · Hematologic: No history of bleeding tendency. No bruises or ecchymosis. No history of clotting problems. · Infectious disease: No fever, chills or frequent infections. · Endocrine: No polydipsia or polyuria. No temperature intolerance. · Neurologic: No headaches or dizziness. No weakness or numbness of the extremities. No changes in balance, coordination,  memory, mentation, behavior. · Allergic/Immunologic: No nasal congestion or hives. No repeated infections. PHYSICAL EXAM:  The patient is not in acute distress. Vital signs: Blood pressure 106/69, pulse 91, temperature 98.2 °F (36.8 °C), temperature source Oral, weight 158 lb 1.6 oz (71.7 kg).      General appearance - well appearing, not in pain or distress  Mental status - good mood, alert and oriented  Eyes - pupils equal and reactive, extraocular eye movements intact  Ears - bilateral TM's and external ear canals normal  Nose - normal and patent, no erythema, discharge or polyps  Mouth - mucous membranes moist, pharynx normal without lesions  Neck - supple, no significant adenopathy  Lymphatics - no palpable lymphadenopathy, no hepatosplenomegaly  Chest - clear to auscultation, no wheezes, rales or rhonchi, symmetric air entry  Heart - normal rate, regular rhythm, normal S1, S2, no murmurs, rubs, clicks or gallops  Abdomen - soft, nontender, nondistended, no masses or organomegaly  Neurological - alert, oriented, normal speech, no focal findings or movement disorder noted  Musculoskeletal - no joint tenderness, deformity or swelling  Extremities - peripheral pulses normal, no pedal edema, no clubbing or cyanosis  Skin - normal coloration and turgor, no rashes, no suspicious skin lesions noted     Review of Diagnostic data:   Lab Results   Component Value Date    WBC 10.1 05/07/2021    HGB 6.0 (LL) 05/07/2021    HCT 20.8 (L) 05/07/2021    MCV 72.8 (L) 05/07/2021    PLT 1,211 (HH) 05/07/2021       Chemistry        Component Value Date/Time     05/07/2021 0820    K 4.1 05/07/2021 0820     05/07/2021 0820    CO2 26 05/07/2021 0820    BUN 11 05/07/2021 0820    CREATININE <0.40 (L) 05/07/2021 0820        Component Value Date/Time    CALCIUM 8.8 05/07/2021 0820    ALKPHOS 51 05/07/2021 0820    AST 10 05/07/2021 0820    ALT <5 (L) 05/07/2021 0820    BILITOT 0.10 (L) 05/07/2021 0820          Lab Results   Component Value Date     307 (H) 05/07/2021         IMPRESSION:   Recurrent ovarian cancer. Original diagnosis 2005 with multiple recurrences. Diffuse metastasis. Recent active intra-abdominal bleeding due to splenic mass with GI infiltration. Status post embolization  S/p seizure disorder. PLAN:   Discussed palliative treatment. Different regimens were discussed. We prescribed Doxil as single agent but insurance declined. Patient was started on Doxil and Avastin.   She tolerated treatment fairly well. However due to recent episode of intra-abdominal bleeding we will discontinue Avastin. Next treatment will be with the use of Doxil. Patient had embolization by vascular. Reccent hospitalization for seizure. No brain mets. Seizures controlled. Hospitalized last week with left leg DVT and anemia. Hemoglobin is now stable. She received blood transfusion. No active bleeding. She is currently on Eliquis for DVT. Tolerated well with no side effects. We will give blood transfusion for symptomatic anemia. We will proceed with Gemzar chemotherapy today. Reminded about possible side effects. She agreed. Monitor toxicity and response to treatment  Patient's questions were answered to the best of her satisfaction and she verbalized full understanding and agreement.

## 2021-05-10 ENCOUNTER — TELEPHONE (OUTPATIENT)
Dept: INFUSION THERAPY | Age: 56
End: 2021-05-10

## 2021-05-10 DIAGNOSIS — C79.60 MALIGNANT NEOPLASM METASTATIC TO OVARY, UNSPECIFIED LATERALITY (HCC): ICD-10-CM

## 2021-05-10 DIAGNOSIS — R11.2 NAUSEA AND VOMITING, INTRACTABILITY OF VOMITING NOT SPECIFIED, UNSPECIFIED VOMITING TYPE: ICD-10-CM

## 2021-05-10 RX ORDER — PANTOPRAZOLE SODIUM 40 MG/1
40 TABLET, DELAYED RELEASE ORAL 2 TIMES DAILY
Qty: 60 TABLET | Refills: 0 | Status: ON HOLD
Start: 2021-05-10 | End: 2021-10-31 | Stop reason: HOSPADM

## 2021-05-11 ENCOUNTER — TELEPHONE (OUTPATIENT)
Dept: ONCOLOGY | Age: 56
End: 2021-05-11

## 2021-05-11 NOTE — TELEPHONE ENCOUNTER
Name: Denzel Coombs [facility]  : 1965  MRN: [id number]    Oncology Navigation Follow-Up Note    Contact Type:  Telephone  Notes: Spoke with pt for f/u call. Pt denied questions/concerns. Instructed pt may contact writer prn. Will continue to follow.     Electronically signed by Álvaro Moya RN on 2021 at 11:16 AM

## 2021-05-12 DIAGNOSIS — C79.51 CANCER, METASTATIC TO BONE (HCC): ICD-10-CM

## 2021-05-12 DIAGNOSIS — M54.59 INTRACTABLE LOW BACK PAIN: ICD-10-CM

## 2021-05-12 RX ORDER — MORPHINE SULFATE 15 MG/1
15 TABLET, FILM COATED, EXTENDED RELEASE ORAL 2 TIMES DAILY
Qty: 60 TABLET | Refills: 0 | Status: SHIPPED | OUTPATIENT
Start: 2021-05-12 | End: 2021-06-07 | Stop reason: SDUPTHER

## 2021-05-12 RX ORDER — MORPHINE SULFATE 30 MG/1
30 TABLET, FILM COATED, EXTENDED RELEASE ORAL 2 TIMES DAILY
Qty: 60 TABLET | Refills: 0 | Status: SHIPPED | OUTPATIENT
Start: 2021-05-12 | End: 2021-06-29 | Stop reason: SDUPTHER

## 2021-05-13 ENCOUNTER — TELEPHONE (OUTPATIENT)
Dept: ONCOLOGY | Age: 56
End: 2021-05-13

## 2021-05-13 NOTE — TELEPHONE ENCOUNTER
RECEIVED A  [facility] [phone] STATING THEY RECEIVED A SCRIPT FOR MORPHINE ER HOWEVER IN NEED OF A PRIOR AUTH. CONTACT NUMBER FOR PA 1 [phone] AND THEY WILL FAX FORM TOO.     UPON REVIEW PT FOLLOWS IN  [facility]    ROUTED NOTE

## 2021-05-14 ENCOUNTER — HOSPITAL ENCOUNTER (OUTPATIENT)
Dept: INFUSION THERAPY | Age: 56
Discharge: HOME | End: 2021-05-14
Payer: COMMERCIAL

## 2021-05-14 VITALS
TEMPERATURE: 98.9 F | RESPIRATION RATE: 18 BRPM | HEIGHT: 65 IN | SYSTOLIC BLOOD PRESSURE: 112 MMHG | WEIGHT: 159 LBS | DIASTOLIC BLOOD PRESSURE: 70 MMHG | HEART RATE: 109 BPM | BODY MASS INDEX: 26.49 KG/M2

## 2021-05-14 DIAGNOSIS — C56.9 MALIGNANT NEOPLASM OF OVARY, UNSPECIFIED LATERALITY (HCC): Primary | ICD-10-CM

## 2021-05-14 DIAGNOSIS — Z85.43 HISTORY OF MALIGNANT NEOPLASM OF OVARY: ICD-10-CM

## 2021-05-14 DIAGNOSIS — C79.51 CANCER, METASTATIC TO BONE (HCC): ICD-10-CM

## 2021-05-14 LAB
ABSOLUTE EOS #: 0.06 K/UL (ref 0–0.4)
ABSOLUTE IMMATURE GRANULOCYTE: ABNORMAL K/UL (ref 0–0.3)
ABSOLUTE LYMPH #: 1.98 K/UL (ref 1–4.8)
ABSOLUTE MONO #: 0.54 K/UL (ref 0.1–1.2)
ALBUMIN SERPL-MCNC: 4.1 G/DL (ref 3.5–5.2)
ALBUMIN/GLOBULIN RATIO: 1.6 (ref 1–2.5)
ALP BLD-CCNC: 59 U/L (ref 35–104)
ALT SERPL-CCNC: 7 U/L (ref 5–33)
ANION GAP SERPL CALCULATED.3IONS-SCNC: 11 MMOL/L (ref 9–17)
AST SERPL-CCNC: 11 U/L
BASOPHILS # BLD: 1 % (ref 0–2)
BASOPHILS ABSOLUTE: 0.06 K/UL (ref 0–0.2)
BILIRUB SERPL-MCNC: 0.16 MG/DL (ref 0.3–1.2)
BUN BLDV-MCNC: 9 MG/DL (ref 6–20)
BUN/CREAT BLD: ABNORMAL (ref 9–20)
CALCIUM SERPL-MCNC: 9 MG/DL (ref 8.6–10.4)
CHLORIDE BLD-SCNC: 102 MMOL/L (ref 98–107)
CO2: 26 MMOL/L (ref 20–31)
CREAT SERPL-MCNC: 0.4 MG/DL (ref 0.5–0.9)
DIFFERENTIAL TYPE: ABNORMAL
EOSINOPHILS RELATIVE PERCENT: 1 % (ref 1–4)
GFR AFRICAN AMERICAN: >60 ML/MIN
GFR NON-AFRICAN AMERICAN: >60 ML/MIN
GFR SERPL CREATININE-BSD FRML MDRD: ABNORMAL ML/MIN/{1.73_M2}
GFR SERPL CREATININE-BSD FRML MDRD: ABNORMAL ML/MIN/{1.73_M2}
GLUCOSE BLD-MCNC: 120 MG/DL (ref 70–99)
HCT VFR BLD CALC: 25.7 % (ref 36–46)
HEMOGLOBIN: 7.5 G/DL (ref 12–16)
IMMATURE GRANULOCYTES: ABNORMAL %
LYMPHOCYTES # BLD: 33 % (ref 24–44)
MCH RBC QN AUTO: 22.1 PG (ref 26–34)
MCHC RBC AUTO-ENTMCNC: 29.3 G/DL (ref 31–37)
MCV RBC AUTO: 75.5 FL (ref 80–100)
MONOCYTES # BLD: 9 % (ref 2–11)
MORPHOLOGY: ABNORMAL
NRBC AUTOMATED: ABNORMAL PER 100 WBC
PDW BLD-RTO: 29.3 % (ref 12.5–15.4)
PLATELET # BLD: 690 K/UL (ref 140–450)
PLATELET ESTIMATE: ABNORMAL
PMV BLD AUTO: 6.4 FL (ref 6–12)
POTASSIUM SERPL-SCNC: 4.2 MMOL/L (ref 3.7–5.3)
RBC # BLD: 3.41 M/UL (ref 4–5.2)
RBC # BLD: ABNORMAL 10*6/UL
SEG NEUTROPHILS: 56 % (ref 36–66)
SEGMENTED NEUTROPHILS ABSOLUTE COUNT: 3.36 K/UL (ref 1.8–7.7)
SODIUM BLD-SCNC: 139 MMOL/L (ref 135–144)
TOTAL PROTEIN: 6.7 G/DL (ref 6.4–8.3)
WBC # BLD: 6 K/UL (ref 3.5–11)
WBC # BLD: ABNORMAL 10*3/UL

## 2021-05-14 PROCEDURE — 80053 COMPREHEN METABOLIC PANEL: CPT

## 2021-05-14 PROCEDURE — 96413 CHEMO IV INFUSION 1 HR: CPT | Performed by: NURSE PRACTITIONER

## 2021-05-14 PROCEDURE — 96375 TX/PRO/DX INJ NEW DRUG ADDON: CPT | Performed by: NURSE PRACTITIONER

## 2021-05-14 PROCEDURE — 2580000003 HC RX 258: Performed by: INTERNAL MEDICINE

## 2021-05-14 PROCEDURE — 36591 DRAW BLOOD OFF VENOUS DEVICE: CPT | Performed by: NURSE PRACTITIONER

## 2021-05-14 PROCEDURE — 85025 COMPLETE CBC W/AUTO DIFF WBC: CPT

## 2021-05-14 PROCEDURE — 6360000002 HC RX W HCPCS: Performed by: INTERNAL MEDICINE

## 2021-05-14 RX ORDER — SODIUM CHLORIDE 0.9 % (FLUSH) 0.9 %
10 SYRINGE (ML) INJECTION PRN
Status: DISCONTINUED | OUTPATIENT
Start: 2021-05-14 | End: 2021-05-15 | Stop reason: HOSPADM

## 2021-05-14 RX ORDER — SODIUM CHLORIDE 9 MG/ML
20 INJECTION, SOLUTION INTRAVENOUS ONCE
Status: COMPLETED | OUTPATIENT
Start: 2021-05-14 | End: 2021-05-14

## 2021-05-14 RX ORDER — HEPARIN SODIUM (PORCINE) LOCK FLUSH IV SOLN 100 UNIT/ML 100 UNIT/ML
500 SOLUTION INTRAVENOUS PRN
Status: DISCONTINUED | OUTPATIENT
Start: 2021-05-14 | End: 2021-05-15 | Stop reason: HOSPADM

## 2021-05-14 RX ORDER — DEXAMETHASONE SODIUM PHOSPHATE 4 MG/ML
8 INJECTION, SOLUTION INTRA-ARTICULAR; INTRALESIONAL; INTRAMUSCULAR; INTRAVENOUS; SOFT TISSUE ONCE
Status: COMPLETED | OUTPATIENT
Start: 2021-05-14 | End: 2021-05-14

## 2021-05-14 RX ADMIN — DEXAMETHASONE SODIUM PHOSPHATE 8 MG: 4 INJECTION, SOLUTION INTRAMUSCULAR; INTRAVENOUS at 14:28

## 2021-05-14 RX ADMIN — SODIUM CHLORIDE, PRESERVATIVE FREE 10 ML: 5 INJECTION INTRAVENOUS at 15:33

## 2021-05-14 RX ADMIN — GEMCITABINE HYDROCHLORIDE 1800 MG: 1 INJECTION, SOLUTION INTRAVENOUS at 14:53

## 2021-05-14 RX ADMIN — SODIUM CHLORIDE, PRESERVATIVE FREE 10 ML: 5 INJECTION INTRAVENOUS at 13:25

## 2021-05-14 RX ADMIN — SODIUM CHLORIDE, PRESERVATIVE FREE 10 ML: 5 INJECTION INTRAVENOUS at 13:26

## 2021-05-14 RX ADMIN — HEPARIN 500 UNITS: 100 SYRINGE at 15:33

## 2021-05-14 RX ADMIN — SODIUM CHLORIDE 20 ML/HR: 9 INJECTION, SOLUTION INTRAVENOUS at 14:28

## 2021-05-14 NOTE — PROGRESS NOTES
Patient here for c4d8 gemzar. Patient c/o fatigue, no other complaints. Labs reviewed with Dr. Cirilo Freitas. Hgb  7.5. instructed to treat as ordered. No blood today, only if it drops to hgb <7.0. I verbalizeded understanding. Patient treated without incident.

## 2021-05-17 DIAGNOSIS — C79.51 CANCER, METASTATIC TO BONE (HCC): ICD-10-CM

## 2021-05-17 DIAGNOSIS — M54.59 INTRACTABLE LOW BACK PAIN: ICD-10-CM

## 2021-05-18 ENCOUNTER — TELEPHONE (OUTPATIENT)
Dept: INFUSION THERAPY | Age: 56
End: 2021-05-18

## 2021-05-18 NOTE — TELEPHONE ENCOUNTER
PA received for morphine. Attempted to do PA over the phone by Cassandra Dillard from [employer] stated that they needed a form. Form completed and faxed to [employer]. Fax # [phone]  Confirmation received.

## 2021-05-19 RX ORDER — OXYCODONE HYDROCHLORIDE AND ACETAMINOPHEN 5; 325 MG/1; MG/1
1 TABLET ORAL EVERY 4 HOURS PRN
Qty: 180 TABLET | Refills: 0 | Status: ON HOLD | OUTPATIENT
Start: 2021-05-19 | End: 2021-06-23

## 2021-05-20 NOTE — TELEPHONE ENCOUNTER
Writer called [facility] and spoke with tech. Tech stated that the PA for Morphine Sulfate was approved and stated rx is out for delivery today.  Alfredo Collado

## 2021-05-28 ENCOUNTER — HOSPITAL ENCOUNTER (OUTPATIENT)
Dept: INFUSION THERAPY | Age: 56
Discharge: HOME | End: 2021-05-28
Payer: COMMERCIAL

## 2021-05-28 ENCOUNTER — TELEPHONE (OUTPATIENT)
Dept: ONCOLOGY | Age: 56
End: 2021-05-28

## 2021-05-28 ENCOUNTER — OFFICE VISIT (OUTPATIENT)
Dept: ONCOLOGY | Age: 56
End: 2021-05-28
Payer: COMMERCIAL

## 2021-05-28 VITALS
DIASTOLIC BLOOD PRESSURE: 69 MMHG | HEART RATE: 88 BPM | BODY MASS INDEX: 26.71 KG/M2 | SYSTOLIC BLOOD PRESSURE: 110 MMHG | WEIGHT: 160.5 LBS | TEMPERATURE: 98.6 F | RESPIRATION RATE: 16 BRPM

## 2021-05-28 DIAGNOSIS — C56.9 MALIGNANT NEOPLASM OF OVARY, UNSPECIFIED LATERALITY (HCC): Primary | ICD-10-CM

## 2021-05-28 DIAGNOSIS — C79.51 CANCER, METASTATIC TO BONE (HCC): ICD-10-CM

## 2021-05-28 DIAGNOSIS — Z85.43 HISTORY OF MALIGNANT NEOPLASM OF OVARY: ICD-10-CM

## 2021-05-28 DIAGNOSIS — K90.9 IRON MALABSORPTION: ICD-10-CM

## 2021-05-28 DIAGNOSIS — D50.8 IRON DEFICIENCY ANEMIA SECONDARY TO INADEQUATE DIETARY IRON INTAKE: ICD-10-CM

## 2021-05-28 LAB
ABSOLUTE EOS #: 0.35 K/UL (ref 0–0.4)
ABSOLUTE IMMATURE GRANULOCYTE: ABNORMAL K/UL (ref 0–0.3)
ABSOLUTE LYMPH #: 2.9 K/UL (ref 1–4.8)
ABSOLUTE MONO #: 0.93 K/UL (ref 0.1–1.2)
ALBUMIN SERPL-MCNC: 4.1 G/DL (ref 3.5–5.2)
ALBUMIN/GLOBULIN RATIO: 1.7 (ref 1–2.5)
ALP BLD-CCNC: 53 U/L (ref 35–104)
ALT SERPL-CCNC: <5 U/L (ref 5–33)
ANION GAP SERPL CALCULATED.3IONS-SCNC: 10 MMOL/L (ref 9–17)
AST SERPL-CCNC: 10 U/L
BASOPHILS # BLD: 2 % (ref 0–2)
BASOPHILS ABSOLUTE: 0.23 K/UL (ref 0–0.2)
BILIRUB SERPL-MCNC: 0.13 MG/DL (ref 0.3–1.2)
BUN BLDV-MCNC: 13 MG/DL (ref 6–20)
BUN/CREAT BLD: ABNORMAL (ref 9–20)
CA 125: 292 U/ML
CALCIUM SERPL-MCNC: 8.6 MG/DL (ref 8.6–10.4)
CHLORIDE BLD-SCNC: 104 MMOL/L (ref 98–107)
CO2: 23 MMOL/L (ref 20–31)
CREAT SERPL-MCNC: 0.4 MG/DL (ref 0.5–0.9)
DIFFERENTIAL TYPE: ABNORMAL
EOSINOPHILS RELATIVE PERCENT: 3 % (ref 1–4)
GFR AFRICAN AMERICAN: >60 ML/MIN
GFR NON-AFRICAN AMERICAN: >60 ML/MIN
GFR SERPL CREATININE-BSD FRML MDRD: ABNORMAL ML/MIN/{1.73_M2}
GFR SERPL CREATININE-BSD FRML MDRD: ABNORMAL ML/MIN/{1.73_M2}
GLUCOSE BLD-MCNC: 138 MG/DL (ref 70–99)
HCT VFR BLD CALC: 25.2 % (ref 36–46)
HEMOGLOBIN: 7.1 G/DL (ref 12–16)
IMMATURE GRANULOCYTES: ABNORMAL %
LYMPHOCYTES # BLD: 25 % (ref 24–44)
MCH RBC QN AUTO: 20.8 PG (ref 26–34)
MCHC RBC AUTO-ENTMCNC: 28 G/DL (ref 31–37)
MCV RBC AUTO: 74.2 FL (ref 80–100)
MONOCYTES # BLD: 8 % (ref 2–11)
MORPHOLOGY: ABNORMAL
NRBC AUTOMATED: ABNORMAL PER 100 WBC
PDW BLD-RTO: 29.4 % (ref 12.5–15.4)
PLATELET # BLD: 1283 K/UL (ref 140–450)
PLATELET ESTIMATE: ABNORMAL
PMV BLD AUTO: 7 FL (ref 6–12)
POTASSIUM SERPL-SCNC: 4 MMOL/L (ref 3.7–5.3)
RBC # BLD: 3.39 M/UL (ref 4–5.2)
RBC # BLD: ABNORMAL 10*6/UL
SEG NEUTROPHILS: 62 % (ref 36–66)
SEGMENTED NEUTROPHILS ABSOLUTE COUNT: 7.19 K/UL (ref 1.8–7.7)
SODIUM BLD-SCNC: 137 MMOL/L (ref 135–144)
TOTAL PROTEIN: 6.5 G/DL (ref 6.4–8.3)
WBC # BLD: 11.6 K/UL (ref 3.5–11)
WBC # BLD: ABNORMAL 10*3/UL

## 2021-05-28 PROCEDURE — 2580000003 HC RX 258: Performed by: INTERNAL MEDICINE

## 2021-05-28 PROCEDURE — 96375 TX/PRO/DX INJ NEW DRUG ADDON: CPT | Performed by: NURSE PRACTITIONER

## 2021-05-28 PROCEDURE — 3017F COLORECTAL CA SCREEN DOC REV: CPT | Performed by: INTERNAL MEDICINE

## 2021-05-28 PROCEDURE — 99214 OFFICE O/P EST MOD 30 MIN: CPT | Performed by: INTERNAL MEDICINE

## 2021-05-28 PROCEDURE — G8427 DOCREV CUR MEDS BY ELIG CLIN: HCPCS | Performed by: INTERNAL MEDICINE

## 2021-05-28 PROCEDURE — 6360000002 HC RX W HCPCS: Performed by: INTERNAL MEDICINE

## 2021-05-28 PROCEDURE — 86850 RBC ANTIBODY SCREEN: CPT

## 2021-05-28 PROCEDURE — 86880 COOMBS TEST DIRECT: CPT

## 2021-05-28 PROCEDURE — G8417 CALC BMI ABV UP PARAM F/U: HCPCS | Performed by: INTERNAL MEDICINE

## 2021-05-28 PROCEDURE — 86870 RBC ANTIBODY IDENTIFICATION: CPT

## 2021-05-28 PROCEDURE — 36591 DRAW BLOOD OFF VENOUS DEVICE: CPT | Performed by: NURSE PRACTITIONER

## 2021-05-28 PROCEDURE — 4004F PT TOBACCO SCREEN RCVD TLK: CPT | Performed by: INTERNAL MEDICINE

## 2021-05-28 PROCEDURE — 86902 BLOOD TYPE ANTIGEN DONOR EA: CPT

## 2021-05-28 PROCEDURE — 86901 BLOOD TYPING SEROLOGIC RH(D): CPT

## 2021-05-28 PROCEDURE — 86920 COMPATIBILITY TEST SPIN: CPT

## 2021-05-28 PROCEDURE — 86304 IMMUNOASSAY TUMOR CA 125: CPT

## 2021-05-28 PROCEDURE — 86900 BLOOD TYPING SEROLOGIC ABO: CPT

## 2021-05-28 PROCEDURE — 99211 OFF/OP EST MAY X REQ PHY/QHP: CPT | Performed by: INTERNAL MEDICINE

## 2021-05-28 PROCEDURE — 96413 CHEMO IV INFUSION 1 HR: CPT | Performed by: NURSE PRACTITIONER

## 2021-05-28 PROCEDURE — P9016 RBC LEUKOCYTES REDUCED: HCPCS

## 2021-05-28 PROCEDURE — 80053 COMPREHEN METABOLIC PANEL: CPT

## 2021-05-28 PROCEDURE — 85025 COMPLETE CBC W/AUTO DIFF WBC: CPT

## 2021-05-28 RX ORDER — DIPHENHYDRAMINE HYDROCHLORIDE 50 MG/ML
50 INJECTION INTRAMUSCULAR; INTRAVENOUS ONCE
Status: CANCELLED | OUTPATIENT
Start: 2021-05-28 | End: 2021-05-28

## 2021-05-28 RX ORDER — SODIUM CHLORIDE 9 MG/ML
INJECTION, SOLUTION INTRAVENOUS CONTINUOUS
Status: CANCELLED | OUTPATIENT
Start: 2021-06-04

## 2021-05-28 RX ORDER — HEPARIN SODIUM (PORCINE) LOCK FLUSH IV SOLN 100 UNIT/ML 100 UNIT/ML
500 SOLUTION INTRAVENOUS PRN
Status: CANCELLED | OUTPATIENT
Start: 2021-05-28

## 2021-05-28 RX ORDER — METHYLPREDNISOLONE SODIUM SUCCINATE 125 MG/2ML
125 INJECTION, POWDER, LYOPHILIZED, FOR SOLUTION INTRAMUSCULAR; INTRAVENOUS ONCE
Status: CANCELLED | OUTPATIENT
Start: 2021-07-16 | End: 2021-06-18

## 2021-05-28 RX ORDER — HEPARIN SODIUM (PORCINE) LOCK FLUSH IV SOLN 100 UNIT/ML 100 UNIT/ML
500 SOLUTION INTRAVENOUS PRN
Status: CANCELLED | OUTPATIENT
Start: 2021-07-16

## 2021-05-28 RX ORDER — METHYLPREDNISOLONE SODIUM SUCCINATE 125 MG/2ML
125 INJECTION, POWDER, LYOPHILIZED, FOR SOLUTION INTRAMUSCULAR; INTRAVENOUS ONCE
Status: CANCELLED | OUTPATIENT
Start: 2021-05-28 | End: 2021-05-28

## 2021-05-28 RX ORDER — SODIUM CHLORIDE 9 MG/ML
INJECTION, SOLUTION INTRAVENOUS PRN
Status: CANCELLED | OUTPATIENT
Start: 2021-05-28

## 2021-05-28 RX ORDER — SODIUM CHLORIDE 9 MG/ML
INJECTION, SOLUTION INTRAVENOUS CONTINUOUS
Status: CANCELLED | OUTPATIENT
Start: 2021-05-28

## 2021-05-28 RX ORDER — EPINEPHRINE 1 MG/ML
0.3 INJECTION, SOLUTION, CONCENTRATE INTRAVENOUS PRN
Status: CANCELLED | OUTPATIENT
Start: 2021-05-28

## 2021-05-28 RX ORDER — METHYLPREDNISOLONE SODIUM SUCCINATE 125 MG/2ML
125 INJECTION, POWDER, LYOPHILIZED, FOR SOLUTION INTRAMUSCULAR; INTRAVENOUS ONCE
Status: CANCELLED | OUTPATIENT
Start: 2021-06-04 | End: 2021-06-04

## 2021-05-28 RX ORDER — DIPHENHYDRAMINE HYDROCHLORIDE 50 MG/ML
50 INJECTION INTRAMUSCULAR; INTRAVENOUS ONCE
Status: CANCELLED | OUTPATIENT
Start: 2021-07-23 | End: 2021-06-25

## 2021-05-28 RX ORDER — SODIUM CHLORIDE 9 MG/ML
INJECTION, SOLUTION INTRAVENOUS CONTINUOUS
Status: CANCELLED | OUTPATIENT
Start: 2021-07-23

## 2021-05-28 RX ORDER — SODIUM CHLORIDE 9 MG/ML
INJECTION, SOLUTION INTRAVENOUS CONTINUOUS
Status: CANCELLED | OUTPATIENT
Start: 2021-07-16

## 2021-05-28 RX ORDER — SODIUM CHLORIDE 9 MG/ML
25 INJECTION, SOLUTION INTRAVENOUS PRN
Status: CANCELLED | OUTPATIENT
Start: 2021-05-28

## 2021-05-28 RX ORDER — SODIUM CHLORIDE 9 MG/ML
20 INJECTION, SOLUTION INTRAVENOUS ONCE
Status: CANCELLED | OUTPATIENT
Start: 2021-05-28 | End: 2021-05-28

## 2021-05-28 RX ORDER — SODIUM CHLORIDE 9 MG/ML
20 INJECTION, SOLUTION INTRAVENOUS ONCE
Status: CANCELLED | OUTPATIENT
Start: 2021-06-04 | End: 2021-06-04

## 2021-05-28 RX ORDER — EPINEPHRINE 1 MG/ML
0.3 INJECTION, SOLUTION, CONCENTRATE INTRAVENOUS PRN
Status: CANCELLED | OUTPATIENT
Start: 2021-06-04

## 2021-05-28 RX ORDER — METHYLPREDNISOLONE SODIUM SUCCINATE 125 MG/2ML
125 INJECTION, POWDER, LYOPHILIZED, FOR SOLUTION INTRAMUSCULAR; INTRAVENOUS ONCE
Status: CANCELLED | OUTPATIENT
Start: 2021-07-23 | End: 2021-06-25

## 2021-05-28 RX ORDER — SODIUM CHLORIDE 0.9 % (FLUSH) 0.9 %
10 SYRINGE (ML) INJECTION PRN
Status: CANCELLED | OUTPATIENT
Start: 2021-05-28

## 2021-05-28 RX ORDER — SODIUM CHLORIDE 0.9 % (FLUSH) 0.9 %
10 SYRINGE (ML) INJECTION PRN
Status: CANCELLED | OUTPATIENT
Start: 2021-07-16

## 2021-05-28 RX ORDER — SODIUM CHLORIDE 0.9 % (FLUSH) 0.9 %
5 SYRINGE (ML) INJECTION PRN
Status: CANCELLED | OUTPATIENT
Start: 2021-05-28

## 2021-05-28 RX ORDER — SODIUM CHLORIDE 0.9 % (FLUSH) 0.9 %
5 SYRINGE (ML) INJECTION PRN
Status: CANCELLED | OUTPATIENT
Start: 2021-07-23

## 2021-05-28 RX ORDER — SODIUM CHLORIDE 9 MG/ML
20 INJECTION, SOLUTION INTRAVENOUS ONCE
Status: COMPLETED | OUTPATIENT
Start: 2021-05-28 | End: 2021-05-28

## 2021-05-28 RX ORDER — SODIUM CHLORIDE 0.9 % (FLUSH) 0.9 %
5 SYRINGE (ML) INJECTION PRN
Status: CANCELLED | OUTPATIENT
Start: 2021-06-04

## 2021-05-28 RX ORDER — SODIUM CHLORIDE 0.9 % (FLUSH) 0.9 %
5 SYRINGE (ML) INJECTION PRN
Status: CANCELLED | OUTPATIENT
Start: 2021-07-16

## 2021-05-28 RX ORDER — SODIUM CHLORIDE 9 MG/ML
20 INJECTION, SOLUTION INTRAVENOUS ONCE
Status: CANCELLED | OUTPATIENT
Start: 2021-07-16 | End: 2021-06-18

## 2021-05-28 RX ORDER — SODIUM CHLORIDE 9 MG/ML
20 INJECTION, SOLUTION INTRAVENOUS ONCE
Status: CANCELLED | OUTPATIENT
Start: 2021-07-23 | End: 2021-06-25

## 2021-05-28 RX ORDER — HEPARIN SODIUM (PORCINE) LOCK FLUSH IV SOLN 100 UNIT/ML 100 UNIT/ML
500 SOLUTION INTRAVENOUS PRN
Status: CANCELLED | OUTPATIENT
Start: 2021-06-04

## 2021-05-28 RX ORDER — HEPARIN SODIUM (PORCINE) LOCK FLUSH IV SOLN 100 UNIT/ML 100 UNIT/ML
500 SOLUTION INTRAVENOUS PRN
Status: CANCELLED | OUTPATIENT
Start: 2021-07-23

## 2021-05-28 RX ORDER — DEXAMETHASONE SODIUM PHOSPHATE 4 MG/ML
8 INJECTION, SOLUTION INTRA-ARTICULAR; INTRALESIONAL; INTRAMUSCULAR; INTRAVENOUS; SOFT TISSUE ONCE
Status: COMPLETED | OUTPATIENT
Start: 2021-05-28 | End: 2021-05-28

## 2021-05-28 RX ORDER — DIPHENHYDRAMINE HYDROCHLORIDE 50 MG/ML
50 INJECTION INTRAMUSCULAR; INTRAVENOUS ONCE
Status: CANCELLED | OUTPATIENT
Start: 2021-07-16 | End: 2021-06-18

## 2021-05-28 RX ORDER — SODIUM CHLORIDE 0.9 % (FLUSH) 0.9 %
10 SYRINGE (ML) INJECTION PRN
Status: CANCELLED | OUTPATIENT
Start: 2021-06-04

## 2021-05-28 RX ORDER — APIXABAN 5 MG/1
5 TABLET, FILM COATED ORAL 2 TIMES DAILY
COMMUNITY
Start: 2021-05-26 | End: 2022-04-08 | Stop reason: SDUPTHER

## 2021-05-28 RX ORDER — SODIUM CHLORIDE 0.9 % (FLUSH) 0.9 %
10 SYRINGE (ML) INJECTION PRN
Status: DISCONTINUED | OUTPATIENT
Start: 2021-05-28 | End: 2021-05-29 | Stop reason: HOSPADM

## 2021-05-28 RX ORDER — DIPHENHYDRAMINE HYDROCHLORIDE 50 MG/ML
50 INJECTION INTRAMUSCULAR; INTRAVENOUS ONCE
Status: CANCELLED | OUTPATIENT
Start: 2021-06-04 | End: 2021-06-04

## 2021-05-28 RX ORDER — EPINEPHRINE 1 MG/ML
0.3 INJECTION, SOLUTION, CONCENTRATE INTRAVENOUS PRN
Status: CANCELLED | OUTPATIENT
Start: 2021-07-16

## 2021-05-28 RX ORDER — SODIUM CHLORIDE 0.9 % (FLUSH) 0.9 %
10 SYRINGE (ML) INJECTION PRN
Status: CANCELLED | OUTPATIENT
Start: 2021-07-23

## 2021-05-28 RX ORDER — EPINEPHRINE 1 MG/ML
0.3 INJECTION, SOLUTION, CONCENTRATE INTRAVENOUS PRN
Status: CANCELLED | OUTPATIENT
Start: 2021-07-23

## 2021-05-28 RX ORDER — SODIUM CHLORIDE 0.9 % (FLUSH) 0.9 %
5-40 SYRINGE (ML) INJECTION PRN
Status: CANCELLED | OUTPATIENT
Start: 2021-05-28

## 2021-05-28 RX ORDER — HEPARIN SODIUM (PORCINE) LOCK FLUSH IV SOLN 100 UNIT/ML 100 UNIT/ML
500 SOLUTION INTRAVENOUS PRN
Status: DISCONTINUED | OUTPATIENT
Start: 2021-05-28 | End: 2021-05-29 | Stop reason: HOSPADM

## 2021-05-28 RX ADMIN — SODIUM CHLORIDE, PRESERVATIVE FREE 10 ML: 5 INJECTION INTRAVENOUS at 11:28

## 2021-05-28 RX ADMIN — HEPARIN 500 UNITS: 100 SYRINGE at 11:28

## 2021-05-28 RX ADMIN — SODIUM CHLORIDE, PRESERVATIVE FREE 10 ML: 5 INJECTION INTRAVENOUS at 09:19

## 2021-05-28 RX ADMIN — SODIUM CHLORIDE 20 ML/HR: 9 INJECTION, SOLUTION INTRAVENOUS at 10:20

## 2021-05-28 RX ADMIN — SODIUM CHLORIDE, PRESERVATIVE FREE 10 ML: 5 INJECTION INTRAVENOUS at 11:31

## 2021-05-28 RX ADMIN — GEMCITABINE 1800 MG: 38 INJECTION, SOLUTION INTRAVENOUS at 10:48

## 2021-05-28 RX ADMIN — DEXAMETHASONE SODIUM PHOSPHATE 8 MG: 4 INJECTION, SOLUTION INTRAMUSCULAR; INTRAVENOUS at 10:29

## 2021-05-28 RX ADMIN — HEPARIN 500 UNITS: 100 SYRINGE at 11:31

## 2021-05-28 NOTE — TELEPHONE ENCOUNTER
AVS from 5/28/21     Chemo today  Blood transfusion one unit soon  IV Iron infusion soon  Add  to today's labs  RV 2-3 weeks    Chemo today  Chela rn will set up blood transfusion for [facility] over the weekend  Iron will be put through precert  Spoke to lab and they will add ca 125 to todays labs  rv scheduled for 6/18/21 @ 8:30am with tx to follow    Pt was given AVS and appt schedule

## 2021-05-28 NOTE — FLOWSHEET NOTE
Writer encountered and visited briefly with Patient while rounding the infusion clinic. Writer gave Pt a blanket. Patient expressed thanks and declined additional needs. 05/28/21 1209   Encounter Summary   Services provided to: Patient   Referral/Consult From: [address] [facility];Family members   Continue Visiting   (5/28/21)   Complexity of Encounter Low   Length of Encounter 15 minutes   Routine   Type Follow up   Assessment Calm; Approachable   Intervention Sustaining presence/ Ministry of presence; Explored feelings, thoughts, concerns   Outcome Expressed gratitude     Electronically signed by Lesia Ruth, Oncology Outpatient [facility] 19, [address] [facility] Radiation Oncology  5/28/2021  12:11 PM

## 2021-05-28 NOTE — PATIENT INSTRUCTIONS
Chemo today  Blood transfusion one unit soon  IV Iron infusion soon  Add  to today's labs  RV 2-3 weeks

## 2021-05-28 NOTE — PROGRESS NOTES
Patient seen by Dr. Denny Laird today, see his notes for visit details. Labs reviewed by Dr. Deacon Van, he ordered to treat today as ordered and to give 1unit of PRBC. Type and screen sent to lab. He also ordered Iron, it has to go through pre-cert. Patient completed treatment without incident. Patient set up at [facility] [place]. 's for her blood transfusion tomorrow. Patient aware and stated she knows where to go. patient's port left accessed for blood transfusion tomorrow, per patient's request.  Patient stable and ambulatory at d/c. Patient notified of transfusion time per Cat Miranda at [facility]'s tomorrow. Patient verbalized understanding.

## 2021-05-29 ENCOUNTER — HOSPITAL ENCOUNTER (OUTPATIENT)
Dept: ONCOLOGY | Age: 56
Discharge: HOME | End: 2021-05-29
Attending: INTERNAL MEDICINE | Admitting: INTERNAL MEDICINE
Payer: COMMERCIAL

## 2021-05-29 VITALS
HEART RATE: 84 BPM | SYSTOLIC BLOOD PRESSURE: 110 MMHG | OXYGEN SATURATION: 97 % | RESPIRATION RATE: 18 BRPM | DIASTOLIC BLOOD PRESSURE: 60 MMHG | TEMPERATURE: 98 F

## 2021-05-29 PROBLEM — C56.9 OVARIAN CANCER (HCC): Status: ACTIVE | Noted: 2021-05-29

## 2021-05-29 PROCEDURE — 36430 TRANSFUSION BLD/BLD COMPNT: CPT

## 2021-05-29 PROCEDURE — P9016 RBC LEUKOCYTES REDUCED: HCPCS

## 2021-05-29 PROCEDURE — 6360000002 HC RX W HCPCS: Performed by: INTERNAL MEDICINE

## 2021-05-29 RX ORDER — SODIUM CHLORIDE 9 MG/ML
INJECTION, SOLUTION INTRAVENOUS PRN
Status: DISCONTINUED | OUTPATIENT
Start: 2021-05-29 | End: 2021-05-29 | Stop reason: HOSPADM

## 2021-05-29 RX ORDER — HEPARIN SODIUM (PORCINE) LOCK FLUSH IV SOLN 100 UNIT/ML 100 UNIT/ML
500 SOLUTION INTRAVENOUS PRN
Status: DISCONTINUED | OUTPATIENT
Start: 2021-05-29 | End: 2021-05-29 | Stop reason: HOSPADM

## 2021-05-29 RX ADMIN — HEPARIN 500 UNITS: 100 SYRINGE at 12:28

## 2021-05-30 LAB
ABO/RH: NORMAL
ANTIBODY IDENTIFICATION: NORMAL
ANTIBODY SCREEN: POSITIVE
ARM BAND NUMBER: NORMAL
BLD PROD TYP BPU: NORMAL
CROSSMATCH RESULT: NORMAL
DAT IGG: NEGATIVE
DISPENSE STATUS BLOOD BANK: NORMAL
EXPIRATION DATE: NORMAL
TRANSFUSION STATUS: NORMAL
UNIT DIVISION: 0
UNIT NUMBER: NORMAL

## 2021-05-30 NOTE — PROGRESS NOTES
_      Chief Complaint   Patient presents with    Follow-up     review status of disease    Gas    Fatigue    Numbness     feet sometimes    Pain     stomach cramps     DIAGNOSIS:       Recurrent ovarian cancer. Original diagnosis 2005 with multiple recurrences. Diffuse metastasis. CURRENT THERAPY:         Doxil/ Avastin started 9/18/2020. Avastin was discontinued due to recent GI bleeding. Status post recent vascular embolization  S/p seizure disorder. Gemzar started 2/26/2021. Interrupted due to recent hospitalization    BRIEF CASE HISTORY:      Ms. Issa Pozo is a very pleasant 60 y.o. female with history of multiple co morbidities as listed. The patient seen in consultation for ovarian cancer with multiple recurrences. 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 She had surgical debulking and she had systemic treatment with Taxol and carboplatin for 6 cycles. Patient was in remission for about 2 years. She had a relapse in 2007 and treated with topotecan with good results. Patient relapsed again in 2008 and was treated with Taxol carboplatin. After 3 cycles of systemic chemotherapy she had problems with carboplatin so she finished 3 more cycles with Taxol. She did well again for 2 to 3 years until she had a relapse in 2012 and she had intraperitoneal chemotherapy treatment with Taxol. Patient was last seen by her oncologist in 2014 at which time she had relatively stable disease with normal tumor marker and no significant abnormal images. Patient moved to [place] after that and she was lost for oncology follow-ups. Patient was recently evaluated again here in [facility] because of abdominal discomfort. Re imaging confirmed metastatic relapse. Biopsy confirmed ovarian cancer recurrence.  Scan showed extensive intraabdominal and splenic involvement and lung mets. She has no symptoms at the present time. Patient denies smoking or alcohol drinking.l    INTERIM HISTORY:    patient is seen for follow up recurrent ovarian cancer. Patient received 1 cycle of palliative chemotherapy using Doxil/ Avastin. Patient was hospitalized at [facility] last week because of severe GI bleeding. She had multiple blood transfusions. She had GI and vascular evaluation. She had evidence of intra-abdominal mass at the splenic area with GI infiltration. Patient had embolization by vascular. Recently readmitted because of a seizure disorder. Images showed no brain mets. Currently seizures are controlled. Patient was hospitalized last week due to left leg DVT and anemia. She was treated accordingly. She received Eliquis. Tolerated well. Started on Gemzar. Tolerated well. She is having increasing weakness and fatigue. Hemoglobin is low. No clear active bleeding. No melena or hematochezia. No hematemesis. PAST MEDICAL HISTORY: has a past medical history of Anemia, Bleeding, Cervical cancer ([address] Depression, Diabetes mellitus ([address] GERD (gastroesophageal reflux disease), Hx of blood clots, Hypertension, Metastatic cancer ([address] Ovarian cancer ([address] Post chemo evaluation, and Splenic lesion. PAST SURGICAL HISTORY: has a past surgical history that includes Hysterectomy, total abdominal; Port Surgery; Tonsillectomy; IR PORT PLACEMENT > 5 YEARS (08/24/2020); Anus surgery; Abscess Drainage (2013); colectomy (03/2013); IR EMBOLIZATION HEMORRHAGE (10/05/2020); and Cardiac catheterization. CURRENT MEDICATIONS:  has a current medication list which includes the following prescription(s): eliquis, oxycodone-acetaminophen, morphine, morphine, pantoprazole, levetiracetam, ferrous sulfate, metformin, sertraline, metoprolol succinate, and aspirin. ALLERGIES:  is allergic to ceftriaxone.     FAMILY HISTORY: Negative for any hematological or oncological conditions. SOCIAL HISTORY:  reports that she has been smoking cigarettes. She has been smoking about 1.00 pack per day. She uses smokeless tobacco. She reports previous alcohol use. She reports that she does not use drugs. REVIEW OF SYSTEMS:     · General: No weakness or fatigue. No unanticipated weight loss or decreased appetite. No fever or chills. · Eyes: No blurred vision, eye pain or double vision. · Ears: No hearing problems or drainage. No tinnitus. · Throat: No sore throat, problems with swallowing or dysphagia. · Respiratory: No cough, sputum or hemoptysis. No shortness of breath. No pleuritic chest pain. · Cardiovascular: No chest pain, orthopnea or PND. No lower extremity edema. No palpitation. · Gastrointestinal: As above. · Genitourinary: No dysuria, hematuria, frequency or urgency. · Musculoskeletal: No muscle aches or pains. No limitation of movement. No back pain. No gait disturbance, No joint complaints. · Dermatologic: No skin rashes or pruritus. No skin lesions or discolorations. · Psychiatric: No depression, anxiety, or stress or signs of schizophrenia. No change in mood or affect. · Hematologic: No history of bleeding tendency. No bruises or ecchymosis. No history of clotting problems. · Infectious disease: No fever, chills or frequent infections. · Endocrine: No polydipsia or polyuria. No temperature intolerance. · Neurologic: No headaches or dizziness. No weakness or numbness of the extremities. No changes in balance, coordination,  memory, mentation, behavior. · Allergic/Immunologic: No nasal congestion or hives. No repeated infections. PHYSICAL EXAM:  The patient is not in acute distress. Vital signs: Blood pressure 110/69, pulse 88, temperature 98.6 °F (37 °C), temperature source Oral, resp. rate 16, weight 160 lb 8 oz (72.8 kg).      General appearance - well appearing, not in pain or distress  Mental status - good mood, alert and oriented  Eyes - pupils equal and reactive, extraocular eye movements intact  Ears - bilateral TM's and external ear canals normal  Nose - normal and patent, no erythema, discharge or polyps  Mouth - mucous membranes moist, pharynx normal without lesions  Neck - supple, no significant adenopathy  Lymphatics - no palpable lymphadenopathy, no hepatosplenomegaly  Chest - clear to auscultation, no wheezes, rales or rhonchi, symmetric air entry  Heart - normal rate, regular rhythm, normal S1, S2, no murmurs, rubs, clicks or gallops  Abdomen - soft, nontender, nondistended, no masses or organomegaly  Neurological - alert, oriented, normal speech, no focal findings or movement disorder noted  Musculoskeletal - no joint tenderness, deformity or swelling  Extremities - peripheral pulses normal, no pedal edema, no clubbing or cyanosis  Skin - normal coloration and turgor, no rashes, no suspicious skin lesions noted     Review of Diagnostic data:   Lab Results   Component Value Date    WBC 11.6 (H) 05/28/2021    HGB 7.1 (L) 05/28/2021    HCT 25.2 (L) 05/28/2021    MCV 74.2 (L) 05/28/2021    PLT 1,283 (HH) 05/28/2021       Chemistry        Component Value Date/Time     05/28/2021 0817    K 4.0 05/28/2021 0817     05/28/2021 0817    CO2 23 05/28/2021 0817    BUN 13 05/28/2021 0817    CREATININE 0.40 (L) 05/28/2021 0817        Component Value Date/Time    CALCIUM 8.6 05/28/2021 0817    ALKPHOS 53 05/28/2021 0817    AST 10 05/28/2021 0817    ALT <5 (L) 05/28/2021 0817    BILITOT 0.13 (L) 05/28/2021 0817          Lab Results   Component Value Date     307 (H) 05/07/2021         IMPRESSION:   Recurrent ovarian cancer. Original diagnosis 2005 with multiple recurrences. Diffuse metastasis. Recent active intra-abdominal bleeding due to splenic mass with GI infiltration. Status post embolization  S/p seizure disorder. PLAN:   Discussed palliative treatment. Different regimens were discussed.   We prescribed Doxil as single agent but insurance declined. Patient was started on Doxil and Avastin. She tolerated treatment fairly well. However due to recent episode of intra-abdominal bleeding we will discontinue Avastin. Next treatment will be with the use of Doxil. Patient had embolization by vascular. Reccent hospitalization for seizure. No brain mets. Seizures controlled. Hospitalized last week with left leg DVT and anemia. Hemoglobin is now stable. She received blood transfusion. No active bleeding. She is currently on Eliquis for DVT. Tolerated well with no side effects. We will give blood transfusion for symptomatic anemia. We will proceed with Gemzar chemotherapy today. Reminded about possible side effects. She agreed. Monitor toxicity and response to treatment  Patient's questions were answered to the best of her satisfaction and she verbalized full understanding and agreement.

## 2021-05-30 NOTE — PROGRESS NOTES
[facility]  Internal Medicine Teaching Residency Program  Inpatient Daily Progress Note  ______________________________________________________________________________    Patient: Angelica Pfeiffer [facility]  YOB: 1965   UVW:[id number]    Acct: [de-identified]     Room: [id number]  Admit date: 1/4/2021  Today's date: 01/09/21  Number of days in the hospital: 5    SUBJECTIVE   Admitting Diagnosis: Status epilepticus  CC: Seizures  Pt examined at bedside. Chart & results reviewed.    -Patient is feeling better today.    -Had no acute issues overnight  -Vitals are stable /80, pulse 92, respiratory rate 19, temp  max 98.8 and oxygen saturation 95% on room air.  -Patient is currently on meropenem as per recommendations of the ID team    ROS:  Constitutional:  negative for chills, fevers, sweats  Respiratory:  negative for cough, dyspnea on exertion, hemoptysis, shortness of breath, wheezing  Cardiovascular:  negative for chest pain, chest pressure/discomfort, lower extremity edema, palpitations  Gastrointestinal:  negative for abdominal pain, constipation, diarrhea, nausea, vomiting  Neurological:  negative for dizziness, headache  BRIEF HISTORY 55-year-old female with history of diabetes, GERD, metastatic ovarian cancer that is post bilateral salpingo-oophorectomy, hysterectomy who presented as a transfer from [facility] ER for status epilepticus. Justin Carrasco was found altered by family and upon EMS arrival she was noted to have a witnessed seizure and was given 4 mg of Versed without cessation of seizures. Kaylin Stout was an attempted nasal intubation which was unsuccessful and patient was transported to [facility] ED.  Patient was intubated and started on propofol.  Once propofol was started clinical seizure activity had resolved.  Patient was noted to have marked leukocytosis and broad-spectrum antibiotics with Vanco and Zosyn were started.  Patient was also given 5 L of LR for fluid resuscitation.     With regards to seizures, her initial diagnosis was in October 2020 where she was noted to have an MRI concerning for PRES. She was discharged home on Keppra 1500 mg twice daily.     Patient also had a recent visit at [address] [place] [address] she  had splenic embolization and subsequent splenic abscess requiring drain placement.  Was noted to be on Invanz per records.     With regards to her ovarian cancer, she follows up with Dr. Angela Dodd:   Patient arrived to [facility]'s ED and was noted to be agitated and biting her ET tube.  She was continued on propofol which was increased to 60 mics due to agitation. Patient was admitted to the neuro ICU.  She was continued on broad-spectrum antibiotics.  ID consult was obtained.  Antibiotics were switched to meropenem.  Preliminary culture with gram-negative rods.  General surgery was consulted with no acute surgical intervention at this time. Tolerated extubation and did well. Heme/onc on board for her metastatic ovarian ca. Transferred to floors for further management.     OBJECTIVE     Vital Signs: /80   Pulse 92   Temp 98.7 °F (37.1 °C) (Oral)   Resp 19   Ht 5' 6\" (1.676 m)   Wt 166 lb 7.2 oz (75.5 kg)   SpO2 95%   BMI 26.87 kg/m²     Temp (24hrs), Av.4 °F (36.9 °C), Min:98 °F (36.7 °C), Max:98.8 °F (37.1 °C)    In: 35   Out: 25 [Drains:25]    Physical Exam:    General appearance - alert, in no distress  Mental status - alert, oriented to person, place, and time  Eyes - pupils equal and reactive, extraocular eye movements intact  Mouth - mucous membranes moist, pharynx normal without lesions  Neck - supple, no significant adenopathy  Chest - clear to auscultation, no wheezes  Heart - normal rate, regular rhythm, normal S1, S2  Abdomen - soft, nontender, nondistended  Neurological - alert, oriented, normal speech, no focal deficits   Extremities - peripheral pulses normal, no pedal edema  Skin - normal coloration and turgor, no rashes     Medications:  Scheduled Medications:    metoprolol succinate  25 mg Oral Daily    aspirin  81 mg Oral Daily    pantoprazole  40 mg Oral BID AC    enoxaparin  40 mg Subcutaneous Daily    potassium bicarb-citric acid  40 mEq Oral Daily    meropenem  1 g Intravenous Q8H    sodium chloride flush  10 mL Intravenous 2 times per day    ferrous sulfate  325 mg Oral Daily with breakfast    sertraline  50 mg Oral Daily    atorvastatin  20 mg Oral Daily    levetiracetam  1,500 mg Intravenous Q12H     Continuous Infusions:    sodium chloride 100 mL/hr at 21 0750     PRN Medications    magnesium sulfate, 1 g, PRN      HYDROcodone-acetaminophen, 1 tablet, Q4H PRN      sodium chloride flush, 10 mL, PRN      sodium chloride flush, 10 mL, PRN      promethazine, 12.5 mg, Q6H PRN    Or      ondansetron, 4 mg, Q6H PRN      polyethylene glycol, 17 g, Daily PRN      acetaminophen, 650 mg, Q6H PRN    Or      acetaminophen, 650 mg, Q6H PRN        Diagnostic Labs:  CBC:   Recent Labs     21  0325 21  0514 21  0630   WBC 14.3* 12.5* 9.8 RBC 4.47 4.41 4.64   HGB 10.3* 10.2* 10.6*   HCT 36.2* 35.8* 37.2   MCV 81.0* 81.2* 80.2*   RDW 25.4* 25.7* 26.0*   * 738* 712*     BMP:   Recent Labs     01/07/21 0325 01/08/21  0514 01/09/21  0630    137 140   K 3.3* 3.9 3.6*    103 103   CO2 24 24 27   BUN 4* 4* 8   CREATININE 0.31* 0.25* 0.26*     BNP: No results for input(s): BNP in the last 72 hours. PT/INR: No results for input(s): PROTIME, INR in the last 72 hours. APTT:   Recent Labs     01/06/21 2037 01/07/21 0325 01/07/21  1035   APTT 23.7 24.5 25.5     CARDIAC ENZYMES: No results for input(s): CKMB, CKMBINDEX, TROPONINI in the last 72 hours.     Invalid input(s): CKTOTAL;3  FASTING LIPID PANEL:No results found for: CHOL, HDL, TRIG  LIVER PROFILE:   Recent Labs     01/07/21 0325 01/08/21  0514 01/09/21  0630   AST 12 10 9   ALT 5 <5* <5*   BILIDIR 0.09 0.09 <0.08   BILITOT 0.25* 0.23* 0.18*   ALKPHOS 83 77 77      MICROBIOLOGY:   Lab Results   Component Value Date/Time    CULTURE NO GROWTH 4 DAYS 01/05/2021 08:45 AM    CULTURE NO GROWTH 4 DAYS 01/05/2021 08:45 AM       Imaging:    Ct Head Wo Contrast    Result Date: 1/4/2021 No acute intracranial hemorrhage, mass-effect, midline shift, or abnormal extra-axial collection. In the setting of known malignant neoplasm with seizure, contrast-enhanced brain MRI is recommended to further evaluate for intracranial metastatic disease. Minimal regions of low attenuation within the periventricular, subcortical, and deep white matter, presumably sequelae of chronic microangiopathic ischemic white matter change, though ultimately age indeterminate without prior comparison imaging. Recommend attention on follow-up. Heterogeneous mineralization is suggested within the calvarium, clivus, and partially imaged upper cervical spine, suspicious for osseous metastatic disease in the setting of a known primary neoplasm. Attention on follow-up MRI. Air-fluid levels with aerated secretions in the sphenoid sinuses and ethmoid air cells, which may relate to intubated status versus underlying pre-existing acute sinusitis. Xr Chest Portable    Result Date: 1/7/2021  Support lines, as detailed Interval progression in now moderate vascular congestion Stable moderate left basilar opacity related to combined pleural-parenchymal process with slight progression in mild streaky right basilar atelectasis     Xr Chest Portable    Result Date: 1/6/2021  Overall stable examination. Cardiomegaly and pulmonary vascular congestion. Small left pleural effusion versus pleural scarring. Xr Chest Portable    Result Date: 1/5/2021  Stable chest Stable support lines Possible mild edema with moderate left perihilar, left basilar opacity related to combined pleuroparenchymal process     Xr Chest Portable    Result Date: 1/4/2021  ET tube as above. Life support apparatus otherwise stable. Mild-to-moderate edema and small left effusion unchanged.      Xr Chest Portable    Result Date: 1/4/2021 -Anticipate DC on Invanz 1 g daily for 30 days     Splenic abscess s/p drainage in December 2020:  -ID following patient, currently on meropenem.    -Surgery was consulted, no intervention at this time,   -Repeat ultrasound spleen at 2 weeks time to see if the abscess is cleared     NSTEMI:  -Cardiology on board, reduced EF 40 % on echo,   -will likely need cardiac cath, but considering diagnosis of metastatic cancer and other comorbidities further recommendations to follow.       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     Hypokalemia:  -Potassium is 3.6 today  -Patient is on potassium bicarb citric acid effervescent tablets 40 mEq daily  -Monitor electrolytes daily     Leukocytosis  - improving,continue to monitor     Thrombocytosis  -Could be related to liver abscess/infection  -The platelet counts are 456 today  -Hem/Onco  On board  -continue to monitor     Chronic combined systolic and diastolic congestive heart failure:  -Echo done on 01/04/2021 showed EF of 41%  -[place] and apical segments hypokinesia  -Grade 1 diastolic dysfunction  -Trivial tricuspid regurgitation, trivial mitral regurgitation, small to mild circumferential pericardial effusions with no echocardiographic evidence of tamponade  -Monitor the patient  -Started the patient on lisinopril 2.5 mg and we can increase the dose depending on the blood pressure, renal functions and electrolytes     Small to mild pericardial effusion:  -Monitor    DVT prophylaxis: Enoxaparin 40 mg daily  Gastric prophylaxis: Pantoprazole    PT/OT: Ongoing  Discharge Julio C Crabtree MD Internal Medicine Resident, PGY-1  [facility]; [place], [place]  1/9/2021, 9:45 AM      Attending Physician Statement  I have discussed the care of Joe Cat, including pertinent history and exam findings with the resident. I have reviewed the key elements of all parts of the encounter with the resident. I have seen and examined the patient with the resident. I agree with the assessment and plan and status of the problem list as documented. I have seen the patient during my round today, have reviewed the chart, I have reviewed the CT scan, other lab data and fluid studies seen. Patient does have a NOEL drainage in place in splenic abscess area which has purulent discharge continued. She has E. coli and diphtheroid from the fluid it is not known what kind of fluid was sent on 01/05/2021 as no procedure note is available by interventional radiology or when she was in neuro ICU and I suspect that this is the fluid which was sent from NOEL drainage from splenic abscess. She had a CT scan on 01/05/2020 which shows loculated pleural effusion which is likely the results of his splenic abscess/subdiaphragmatic fluid collection. She is currently on meropenem clinically she is improving will need outpatient IV antibiotics therapy. If she never had a pleural fluid studies done she will need IR evaluation for possibly thoracentesis of loculated left pleural effusion. Continue with antiepileptic drug currently on Keppra. Seizure precaution to continue. Continue with DVT prophylaxis. Please note that this chart was generated using voice recognition Dragon dictation software. Although every effort was made to ensure the accuracy of this automated transcription, some errors in transcription may have occurred.       Patria Sargent MD  1/9/2021 12:55 PM musculoskeletal

## 2021-06-02 ENCOUNTER — TELEPHONE (OUTPATIENT)
Dept: INFUSION THERAPY | Age: 56
End: 2021-06-02

## 2021-06-02 DIAGNOSIS — C56.9 MALIGNANT NEOPLASM OF OVARY, UNSPECIFIED LATERALITY (HCC): Primary | ICD-10-CM

## 2021-06-02 NOTE — TELEPHONE ENCOUNTER
Per Emily Payment at [facility], iron, tibc, ferritin results needed for approval of Injectafer. Labs ordered and added to schedule for this Friday to be drawn.

## 2021-06-04 ENCOUNTER — HOSPITAL ENCOUNTER (OUTPATIENT)
Dept: INFUSION THERAPY | Age: 56
Discharge: HOME | End: 2021-06-04
Payer: COMMERCIAL

## 2021-06-04 VITALS
DIASTOLIC BLOOD PRESSURE: 72 MMHG | SYSTOLIC BLOOD PRESSURE: 120 MMHG | WEIGHT: 155 LBS | TEMPERATURE: 99 F | BODY MASS INDEX: 25.83 KG/M2 | HEART RATE: 92 BPM | RESPIRATION RATE: 16 BRPM | HEIGHT: 65 IN

## 2021-06-04 DIAGNOSIS — C79.51 CANCER, METASTATIC TO BONE (HCC): Primary | ICD-10-CM

## 2021-06-04 DIAGNOSIS — Z85.43 HISTORY OF MALIGNANT NEOPLASM OF OVARY: ICD-10-CM

## 2021-06-04 DIAGNOSIS — C56.9 MALIGNANT NEOPLASM OF OVARY, UNSPECIFIED LATERALITY (HCC): ICD-10-CM

## 2021-06-04 LAB
ABSOLUTE EOS #: 0.04 K/UL (ref 0–0.4)
ABSOLUTE IMMATURE GRANULOCYTE: ABNORMAL K/UL (ref 0–0.3)
ABSOLUTE LYMPH #: 1.52 K/UL (ref 1–4.8)
ABSOLUTE MONO #: 0.39 K/UL (ref 0.1–0.8)
ALBUMIN SERPL-MCNC: 3.9 G/DL (ref 3.5–5.2)
ALBUMIN/GLOBULIN RATIO: 1.6 (ref 1–2.5)
ALP BLD-CCNC: 57 U/L (ref 35–104)
ALT SERPL-CCNC: 8 U/L (ref 5–33)
ANION GAP SERPL CALCULATED.3IONS-SCNC: 10 MMOL/L (ref 9–17)
AST SERPL-CCNC: 23 U/L
BASOPHILS # BLD: 1 % (ref 0–2)
BASOPHILS ABSOLUTE: 0.04 K/UL (ref 0–0.2)
BILIRUB SERPL-MCNC: 0.17 MG/DL (ref 0.3–1.2)
BUN BLDV-MCNC: 8 MG/DL (ref 6–20)
BUN/CREAT BLD: ABNORMAL (ref 9–20)
CALCIUM SERPL-MCNC: 8.7 MG/DL (ref 8.6–10.4)
CHLORIDE BLD-SCNC: 103 MMOL/L (ref 98–107)
CO2: 24 MMOL/L (ref 20–31)
CREAT SERPL-MCNC: <0.4 MG/DL (ref 0.5–0.9)
DIFFERENTIAL TYPE: ABNORMAL
EOSINOPHILS RELATIVE PERCENT: 1 % (ref 1–4)
FERRITIN: 9 UG/L (ref 13–150)
GFR AFRICAN AMERICAN: ABNORMAL ML/MIN
GFR NON-AFRICAN AMERICAN: ABNORMAL ML/MIN
GFR SERPL CREATININE-BSD FRML MDRD: ABNORMAL ML/MIN/{1.73_M2}
GFR SERPL CREATININE-BSD FRML MDRD: ABNORMAL ML/MIN/{1.73_M2}
GLUCOSE BLD-MCNC: 105 MG/DL (ref 70–99)
HCT VFR BLD CALC: 27.3 % (ref 36–46)
HEMOGLOBIN: 8 G/DL (ref 12–16)
IMMATURE GRANULOCYTES: ABNORMAL %
IRON SATURATION: 5 % (ref 20–55)
IRON: 17 UG/DL (ref 37–145)
LYMPHOCYTES # BLD: 39 % (ref 24–44)
MCH RBC QN AUTO: 22.1 PG (ref 26–34)
MCHC RBC AUTO-ENTMCNC: 29.2 G/DL (ref 31–37)
MCV RBC AUTO: 75.5 FL (ref 80–100)
MONOCYTES # BLD: 10 % (ref 1–7)
MORPHOLOGY: ABNORMAL
NRBC AUTOMATED: ABNORMAL PER 100 WBC
PDW BLD-RTO: 31 % (ref 12.5–15.4)
PLATELET # BLD: 912 K/UL (ref 140–450)
PLATELET ESTIMATE: ABNORMAL
PMV BLD AUTO: 6.4 FL (ref 6–12)
POTASSIUM SERPL-SCNC: 4.2 MMOL/L (ref 3.7–5.3)
RBC # BLD: 3.61 M/UL (ref 4–5.2)
RBC # BLD: ABNORMAL 10*6/UL
SEG NEUTROPHILS: 49 % (ref 36–66)
SEGMENTED NEUTROPHILS ABSOLUTE COUNT: 1.91 K/UL (ref 1.8–7.7)
SODIUM BLD-SCNC: 137 MMOL/L (ref 135–144)
TOTAL IRON BINDING CAPACITY: 326 UG/DL (ref 250–450)
TOTAL PROTEIN: 6.3 G/DL (ref 6.4–8.3)
UNSATURATED IRON BINDING CAPACITY: 309 UG/DL (ref 112–347)
WBC # BLD: 3.9 K/UL (ref 3.5–11)
WBC # BLD: ABNORMAL 10*3/UL

## 2021-06-04 PROCEDURE — 36591 DRAW BLOOD OFF VENOUS DEVICE: CPT

## 2021-06-04 PROCEDURE — 83550 IRON BINDING TEST: CPT

## 2021-06-04 PROCEDURE — 2580000003 HC RX 258: Performed by: INTERNAL MEDICINE

## 2021-06-04 PROCEDURE — 96413 CHEMO IV INFUSION 1 HR: CPT

## 2021-06-04 PROCEDURE — 83540 ASSAY OF IRON: CPT

## 2021-06-04 PROCEDURE — 80053 COMPREHEN METABOLIC PANEL: CPT

## 2021-06-04 PROCEDURE — 85025 COMPLETE CBC W/AUTO DIFF WBC: CPT

## 2021-06-04 PROCEDURE — 96375 TX/PRO/DX INJ NEW DRUG ADDON: CPT

## 2021-06-04 PROCEDURE — 6360000002 HC RX W HCPCS: Performed by: INTERNAL MEDICINE

## 2021-06-04 PROCEDURE — 82728 ASSAY OF FERRITIN: CPT

## 2021-06-04 RX ORDER — DEXAMETHASONE SODIUM PHOSPHATE 4 MG/ML
8 INJECTION, SOLUTION INTRA-ARTICULAR; INTRALESIONAL; INTRAMUSCULAR; INTRAVENOUS; SOFT TISSUE ONCE
Status: COMPLETED | OUTPATIENT
Start: 2021-06-04 | End: 2021-06-04

## 2021-06-04 RX ORDER — SODIUM CHLORIDE 0.9 % (FLUSH) 0.9 %
10 SYRINGE (ML) INJECTION PRN
Status: DISCONTINUED | OUTPATIENT
Start: 2021-06-04 | End: 2021-06-05 | Stop reason: HOSPADM

## 2021-06-04 RX ORDER — SODIUM CHLORIDE 9 MG/ML
20 INJECTION, SOLUTION INTRAVENOUS ONCE
Status: COMPLETED | OUTPATIENT
Start: 2021-06-04 | End: 2021-06-04

## 2021-06-04 RX ORDER — HEPARIN SODIUM (PORCINE) LOCK FLUSH IV SOLN 100 UNIT/ML 100 UNIT/ML
500 SOLUTION INTRAVENOUS PRN
Status: DISCONTINUED | OUTPATIENT
Start: 2021-06-04 | End: 2021-06-05 | Stop reason: HOSPADM

## 2021-06-04 RX ADMIN — SODIUM CHLORIDE 20 ML/HR: 9 INJECTION, SOLUTION INTRAVENOUS at 10:13

## 2021-06-04 RX ADMIN — DEXAMETHASONE SODIUM PHOSPHATE 8 MG: 4 INJECTION, SOLUTION INTRAMUSCULAR; INTRAVENOUS at 10:30

## 2021-06-04 RX ADMIN — SODIUM CHLORIDE, PRESERVATIVE FREE 10 ML: 5 INJECTION INTRAVENOUS at 09:14

## 2021-06-04 RX ADMIN — GEMCITABINE 1800 MG: 38 INJECTION, SOLUTION INTRAVENOUS at 11:00

## 2021-06-04 RX ADMIN — SODIUM CHLORIDE, PRESERVATIVE FREE 10 ML: 5 INJECTION INTRAVENOUS at 11:36

## 2021-06-04 RX ADMIN — HEPARIN 500 UNITS: 100 SYRINGE at 11:36

## 2021-06-04 ASSESSMENT — PAIN DESCRIPTION - LOCATION: LOCATION: ABDOMEN

## 2021-06-04 ASSESSMENT — PAIN SCALES - GENERAL: PAINLEVEL_OUTOF10: 7

## 2021-06-04 ASSESSMENT — PAIN DESCRIPTION - ORIENTATION: ORIENTATION: LOWER

## 2021-06-04 ASSESSMENT — PAIN DESCRIPTION - PAIN TYPE: TYPE: CHRONIC PAIN

## 2021-06-04 NOTE — PROGRESS NOTES
Pt here for C.5D.8 Gemzar. Arrives ambulatory with daughter. C/o cont'd fatigue, occasional diarrhea, chronic lower abd pain rating \"7\" on pain scale. Instructed to use Immodium for diarrhea and notify office if does not help. Labs drawn from port, results reviewed. Tx complete without incident. Pt d/c'd in stable condition. Returns 6/18/21 for f/u with Dr. Davis Buckner and C.6D.1 tx.

## 2021-06-07 DIAGNOSIS — C79.51 CANCER, METASTATIC TO BONE (HCC): ICD-10-CM

## 2021-06-07 RX ORDER — SERTRALINE HYDROCHLORIDE 100 MG/1
100 TABLET, FILM COATED ORAL DAILY
Qty: 30 TABLET | Refills: 3 | Status: SHIPPED | OUTPATIENT
Start: 2021-06-07 | End: 2021-10-04

## 2021-06-07 RX ORDER — MORPHINE SULFATE 15 MG/1
15 TABLET, FILM COATED, EXTENDED RELEASE ORAL 2 TIMES DAILY
Qty: 60 TABLET | Refills: 0 | Status: SHIPPED | OUTPATIENT
Start: 2021-06-07 | End: 2021-07-07

## 2021-06-07 RX ORDER — METOPROLOL SUCCINATE 25 MG/1
25 TABLET, EXTENDED RELEASE ORAL DAILY
Qty: 30 TABLET | Refills: 3 | Status: ON HOLD
Start: 2021-06-07 | End: 2021-06-23 | Stop reason: HOSPADM

## 2021-06-11 RX ORDER — HEPARIN SODIUM (PORCINE) LOCK FLUSH IV SOLN 100 UNIT/ML 100 UNIT/ML
500 SOLUTION INTRAVENOUS PRN
Status: CANCELLED | OUTPATIENT
Start: 2021-06-25

## 2021-06-11 RX ORDER — SODIUM CHLORIDE 9 MG/ML
INJECTION, SOLUTION INTRAVENOUS CONTINUOUS
Status: CANCELLED | OUTPATIENT
Start: 2021-06-25

## 2021-06-11 RX ORDER — METHYLPREDNISOLONE SODIUM SUCCINATE 125 MG/2ML
125 INJECTION, POWDER, LYOPHILIZED, FOR SOLUTION INTRAMUSCULAR; INTRAVENOUS ONCE
Status: CANCELLED | OUTPATIENT
Start: 2021-06-25 | End: 2021-06-11

## 2021-06-11 RX ORDER — SODIUM CHLORIDE 0.9 % (FLUSH) 0.9 %
5-40 SYRINGE (ML) INJECTION PRN
Status: CANCELLED | OUTPATIENT
Start: 2021-06-25

## 2021-06-11 RX ORDER — DIPHENHYDRAMINE HYDROCHLORIDE 50 MG/ML
50 INJECTION INTRAMUSCULAR; INTRAVENOUS ONCE
Status: CANCELLED | OUTPATIENT
Start: 2021-06-25 | End: 2021-06-11

## 2021-06-11 RX ORDER — SODIUM CHLORIDE 9 MG/ML
25 INJECTION, SOLUTION INTRAVENOUS PRN
Status: CANCELLED | OUTPATIENT
Start: 2021-06-25

## 2021-06-11 RX ORDER — EPINEPHRINE 1 MG/ML
0.3 INJECTION, SOLUTION, CONCENTRATE INTRAVENOUS PRN
Status: CANCELLED | OUTPATIENT
Start: 2021-06-25

## 2021-06-14 ENCOUNTER — TELEPHONE (OUTPATIENT)
Dept: INFUSION THERAPY | Age: 56
End: 2021-06-14

## 2021-06-14 ENCOUNTER — TELEPHONE (OUTPATIENT)
Dept: ONCOLOGY | Age: 56
End: 2021-06-14

## 2021-06-14 NOTE — TELEPHONE ENCOUNTER
Pt had  addded on to labs 5/28 per Epic not resulted   Called and spoke with Dione Rubin in lab results in doo 292, will fax and scan into [address] [place] [address] in lab working to have results transferred to JAYA Mantilla

## 2021-06-14 NOTE — TELEPHONE ENCOUNTER
Pt's son, Yovani, called stating he spoke with Dr. Diya Simpson this morning while in his tx about pt. He states pt is going to [facility] this weekend and would like to cx tx Fri so she can \"feel good\". He states Dr. Diya Simpson was agreeable to this, but wanted to make appt for pt to have lab draw Wed to see if she needs blood prior to leaving for vacation. Writer discussed above with Dr. Diya Simpson; he states he did not discuss any of this with Will today, but is agreeable to what he wants. He states ok to cx tx Fri and for pt to come in for cbc Wed. Pt scheduled Wed at 0930 for port draw and scheduling will cx tx for Fri.

## 2021-06-16 ENCOUNTER — HOSPITAL ENCOUNTER (OUTPATIENT)
Dept: INFUSION THERAPY | Age: 56
Discharge: HOME | End: 2021-06-16
Payer: COMMERCIAL

## 2021-06-16 DIAGNOSIS — C56.9 MALIGNANT NEOPLASM OF OVARY, UNSPECIFIED LATERALITY (HCC): Primary | ICD-10-CM

## 2021-06-16 LAB
ABSOLUTE EOS #: 0.26 K/UL (ref 0–0.4)
ABSOLUTE IMMATURE GRANULOCYTE: ABNORMAL K/UL (ref 0–0.3)
ABSOLUTE LYMPH #: 2.02 K/UL (ref 1–4.8)
ABSOLUTE MONO #: 1.06 K/UL (ref 0.1–1.2)
BASOPHILS # BLD: 1 % (ref 0–2)
BASOPHILS ABSOLUTE: 0.09 K/UL (ref 0–0.2)
DIFFERENTIAL TYPE: ABNORMAL
EOSINOPHILS RELATIVE PERCENT: 3 % (ref 1–4)
HCT VFR BLD CALC: 28.6 % (ref 36–46)
HEMOGLOBIN: 8.3 G/DL (ref 12–16)
IMMATURE GRANULOCYTES: ABNORMAL %
LYMPHOCYTES # BLD: 23 % (ref 24–44)
MCH RBC QN AUTO: 21.9 PG (ref 26–34)
MCHC RBC AUTO-ENTMCNC: 29.1 G/DL (ref 31–37)
MCV RBC AUTO: 75.3 FL (ref 80–100)
MONOCYTES # BLD: 12 % (ref 2–11)
MORPHOLOGY: ABNORMAL
NRBC AUTOMATED: ABNORMAL PER 100 WBC
PDW BLD-RTO: 29.9 % (ref 12.5–15.4)
PLATELET # BLD: 671 K/UL (ref 140–450)
PLATELET ESTIMATE: ABNORMAL
PMV BLD AUTO: 7.1 FL (ref 6–12)
RBC # BLD: 3.8 M/UL (ref 4–5.2)
RBC # BLD: ABNORMAL 10*6/UL
SEG NEUTROPHILS: 61 % (ref 36–66)
SEGMENTED NEUTROPHILS ABSOLUTE COUNT: 5.37 K/UL (ref 1.8–7.7)
WBC # BLD: 8.8 K/UL (ref 3.5–11)
WBC # BLD: ABNORMAL 10*3/UL

## 2021-06-16 PROCEDURE — 85025 COMPLETE CBC W/AUTO DIFF WBC: CPT

## 2021-06-16 PROCEDURE — 36591 DRAW BLOOD OFF VENOUS DEVICE: CPT

## 2021-06-16 RX ORDER — HEPARIN SODIUM (PORCINE) LOCK FLUSH IV SOLN 100 UNIT/ML 100 UNIT/ML
500 SOLUTION INTRAVENOUS PRN
Status: CANCELLED | OUTPATIENT
Start: 2021-06-16

## 2021-06-16 RX ORDER — SODIUM CHLORIDE 0.9 % (FLUSH) 0.9 %
10 SYRINGE (ML) INJECTION PRN
Status: DISCONTINUED | OUTPATIENT
Start: 2021-06-16 | End: 2021-06-17 | Stop reason: HOSPADM

## 2021-06-16 RX ORDER — HEPARIN SODIUM (PORCINE) LOCK FLUSH IV SOLN 100 UNIT/ML 100 UNIT/ML
500 SOLUTION INTRAVENOUS PRN
Status: DISCONTINUED | OUTPATIENT
Start: 2021-06-16 | End: 2021-06-17 | Stop reason: HOSPADM

## 2021-06-16 RX ORDER — SODIUM CHLORIDE 0.9 % (FLUSH) 0.9 %
20 SYRINGE (ML) INJECTION PRN
Status: CANCELLED | OUTPATIENT
Start: 2021-06-16

## 2021-06-16 RX ORDER — SODIUM CHLORIDE 0.9 % (FLUSH) 0.9 %
10 SYRINGE (ML) INJECTION PRN
Status: CANCELLED | OUTPATIENT
Start: 2021-06-16

## 2021-06-18 ENCOUNTER — APPOINTMENT (OUTPATIENT)
Dept: MRI IMAGING | Age: 56
DRG: 100 | End: 2021-06-18
Payer: COMMERCIAL

## 2021-06-18 ENCOUNTER — APPOINTMENT (OUTPATIENT)
Dept: GENERAL RADIOLOGY | Age: 56
DRG: 100 | End: 2021-06-18
Payer: COMMERCIAL

## 2021-06-18 ENCOUNTER — APPOINTMENT (OUTPATIENT)
Dept: CT IMAGING | Age: 56
DRG: 100 | End: 2021-06-18
Payer: COMMERCIAL

## 2021-06-18 ENCOUNTER — HOSPITAL ENCOUNTER (INPATIENT)
Age: 56
LOS: 6 days | Discharge: HOME | DRG: 100 | End: 2021-06-24
Attending: EMERGENCY MEDICINE
Payer: COMMERCIAL

## 2021-06-18 DIAGNOSIS — G40.901 STATUS EPILEPTICUS (HCC): Primary | ICD-10-CM

## 2021-06-18 DIAGNOSIS — C79.51 CANCER, METASTATIC TO BONE (HCC): ICD-10-CM

## 2021-06-18 DIAGNOSIS — M54.59 INTRACTABLE LOW BACK PAIN: ICD-10-CM

## 2021-06-18 LAB
-: NORMAL
ABSOLUTE EOS #: 0.94 K/UL (ref 0–0.4)
ABSOLUTE IMMATURE GRANULOCYTE: 0 K/UL (ref 0–0.3)
ABSOLUTE LYMPH #: 4.21 K/UL (ref 1–4.8)
ABSOLUTE MONO #: 1.72 K/UL (ref 0.1–0.8)
ALBUMIN SERPL-MCNC: 4 G/DL (ref 3.5–5.2)
ALBUMIN/GLOBULIN RATIO: 1.5 (ref 1–2.5)
ALLEN TEST: ABNORMAL
ALLEN TEST: POSITIVE
ALP BLD-CCNC: 64 U/L (ref 35–104)
ALT SERPL-CCNC: 5 U/L (ref 5–33)
AMORPHOUS: NORMAL
ANION GAP SERPL CALCULATED.3IONS-SCNC: 12 MMOL/L (ref 9–17)
ANION GAP SERPL CALCULATED.3IONS-SCNC: 19 MMOL/L (ref 9–17)
ANION GAP: 18 MMOL/L (ref 7–16)
AST SERPL-CCNC: 12 U/L
BACTERIA: NORMAL
BASOPHILS # BLD: 1 % (ref 0–2)
BASOPHILS ABSOLUTE: 0.16 K/UL (ref 0–0.2)
BILIRUB SERPL-MCNC: <0.1 MG/DL (ref 0.3–1.2)
BILIRUBIN URINE: NEGATIVE
BUN BLDV-MCNC: 6 MG/DL (ref 6–20)
BUN BLDV-MCNC: 8 MG/DL (ref 6–20)
BUN/CREAT BLD: ABNORMAL (ref 9–20)
BUN/CREAT BLD: ABNORMAL (ref 9–20)
CALCIUM SERPL-MCNC: 8 MG/DL (ref 8.6–10.4)
CALCIUM SERPL-MCNC: 8.7 MG/DL (ref 8.6–10.4)
CASTS UA: NORMAL /LPF (ref 0–8)
CHLORIDE BLD-SCNC: 103 MMOL/L (ref 98–107)
CHLORIDE BLD-SCNC: 105 MMOL/L (ref 98–107)
CO2: 18 MMOL/L (ref 20–31)
CO2: 22 MMOL/L (ref 20–31)
COLOR: YELLOW
COMMENT UA: ABNORMAL
CREAT SERPL-MCNC: 0.24 MG/DL (ref 0.5–0.9)
CREAT SERPL-MCNC: 0.43 MG/DL (ref 0.5–0.9)
CRYSTALS, UA: NORMAL /HPF
DIFFERENTIAL TYPE: ABNORMAL
EOSINOPHILS RELATIVE PERCENT: 6 % (ref 1–4)
EPITHELIAL CELLS UA: NORMAL /HPF (ref 0–5)
FIO2: 50
FIO2: ABNORMAL
GFR AFRICAN AMERICAN: >60 ML/MIN
GFR AFRICAN AMERICAN: >60 ML/MIN
GFR NON-AFRICAN AMERICAN: >60 ML/MIN
GFR SERPL CREATININE-BSD FRML MDRD: >60 ML/MIN
GFR SERPL CREATININE-BSD FRML MDRD: ABNORMAL ML/MIN/{1.73_M2}
GFR SERPL CREATININE-BSD FRML MDRD: NORMAL ML/MIN/{1.73_M2}
GLUCOSE BLD-MCNC: 106 MG/DL (ref 70–99)
GLUCOSE BLD-MCNC: 159 MG/DL (ref 74–100)
GLUCOSE BLD-MCNC: 161 MG/DL (ref 70–99)
GLUCOSE URINE: NEGATIVE
HCO3 VENOUS: 16.6 MMOL/L (ref 22–29)
HCT VFR BLD CALC: 34.3 % (ref 36.3–47.1)
HEMOGLOBIN: 8.9 G/DL (ref 11.9–15.1)
IMMATURE GRANULOCYTES: 0 %
INR BLD: 0.9
KEPPRA: 23 UG/ML
KETONES, URINE: ABNORMAL
LACTIC ACID, WHOLE BLOOD: 1.4 MMOL/L (ref 0.7–2.1)
LACTIC ACID, WHOLE BLOOD: 12.1 MMOL/L (ref 0.7–2.1)
LACTIC ACID, WHOLE BLOOD: 2.3 MMOL/L (ref 0.7–2.1)
LACTIC ACID: ABNORMAL MMOL/L
LACTIC ACID: ABNORMAL MMOL/L
LACTIC ACID: NORMAL MMOL/L
LEUKOCYTE ESTERASE, URINE: NEGATIVE
LYMPHOCYTES # BLD: 27 % (ref 24–44)
MCH RBC QN AUTO: 21.5 PG (ref 25.2–33.5)
MCHC RBC AUTO-ENTMCNC: 25.9 G/DL (ref 28.4–34.8)
MCV RBC AUTO: 83.1 FL (ref 82.6–102.9)
MODE: ABNORMAL
MODE: ABNORMAL
MONOCYTES # BLD: 11 % (ref 1–7)
MORPHOLOGY: ABNORMAL
MUCUS: NORMAL
NEGATIVE BASE EXCESS, ART: 4 (ref 0–2)
NEGATIVE BASE EXCESS, VEN: 11 (ref 0–2)
NITRITE, URINE: NEGATIVE
NRBC AUTOMATED: 0 PER 100 WBC
NUCLEATED RED BLOOD CELLS: 1 PER 100 WBC
O2 DEVICE/FLOW/%: ABNORMAL
O2 DEVICE/FLOW/%: ABNORMAL
O2 SAT, VEN: 92 % (ref 60–85)
OTHER OBSERVATIONS UA: NORMAL
PARTIAL THROMBOPLASTIN TIME: 21 SEC (ref 20.5–30.5)
PATIENT TEMP: ABNORMAL
PATIENT TEMP: ABNORMAL
PCO2, VEN: 42.9 MM HG (ref 41–51)
PDW BLD-RTO: 28.4 % (ref 11.8–14.4)
PH UA: 5 (ref 5–8)
PH VENOUS: 7.2 (ref 7.32–7.43)
PLATELET # BLD: ABNORMAL K/UL (ref 138–453)
PLATELET ESTIMATE: ABNORMAL
PLATELET, FLUORESCENCE: 1382 K/UL (ref 138–453)
PLATELET, IMMATURE FRACTION: 2.3 % (ref 1.1–10.3)
PMV BLD AUTO: ABNORMAL FL (ref 8.1–13.5)
PO2, VEN: 77.5 MM HG (ref 30–50)
POC BUN: 8 MG/DL (ref 8–26)
POC CHLORIDE: 108 MMOL/L (ref 98–107)
POC CREATININE: 0.58 MG/DL (ref 0.51–1.19)
POC HCO3: 21.8 MMOL/L (ref 21–28)
POC HEMATOCRIT: 36 % (ref 36–46)
POC HEMOGLOBIN: 12.1 G/DL (ref 12–16)
POC IONIZED CALCIUM: 1.14 MMOL/L (ref 1.15–1.33)
POC LACTIC ACID: 9.98 MMOL/L (ref 0.56–1.39)
POC O2 SATURATION: 99 % (ref 94–98)
POC PCO2 TEMP: ABNORMAL MM HG
POC PCO2 TEMP: ABNORMAL MM HG
POC PCO2: 42.5 MM HG (ref 35–48)
POC PH TEMP: ABNORMAL
POC PH TEMP: ABNORMAL
POC PH: 7.32 (ref 7.35–7.45)
POC PO2 TEMP: ABNORMAL MM HG
POC PO2 TEMP: ABNORMAL MM HG
POC PO2: 173.9 MM HG (ref 83–108)
POC POTASSIUM: 3.6 MMOL/L (ref 3.5–4.5)
POC SODIUM: 143 MMOL/L (ref 138–146)
POC TCO2: 18 MMOL/L (ref 22–30)
POSITIVE BASE EXCESS, ART: ABNORMAL (ref 0–3)
POSITIVE BASE EXCESS, VEN: ABNORMAL (ref 0–3)
POTASSIUM SERPL-SCNC: 3.4 MMOL/L (ref 3.7–5.3)
POTASSIUM SERPL-SCNC: 3.9 MMOL/L (ref 3.7–5.3)
PROCALCITONIN: 0.05 NG/ML
PROTEIN UA: ABNORMAL
PROTHROMBIN TIME: 10.1 SEC (ref 9.1–12.3)
RBC # BLD: 4.13 M/UL (ref 3.95–5.11)
RBC # BLD: ABNORMAL 10*6/UL
RBC UA: NORMAL /HPF (ref 0–4)
RENAL EPITHELIAL, UA: NORMAL /HPF
SAMPLE SITE: ABNORMAL
SAMPLE SITE: ABNORMAL
SARS-COV-2, RAPID: NOT DETECTED
SEG NEUTROPHILS: 55 % (ref 36–66)
SEGMENTED NEUTROPHILS ABSOLUTE COUNT: 8.57 K/UL (ref 1.8–7.7)
SODIUM BLD-SCNC: 139 MMOL/L (ref 135–144)
SODIUM BLD-SCNC: 140 MMOL/L (ref 135–144)
SPECIFIC GRAVITY UA: 1.03 (ref 1–1.03)
SPECIMEN DESCRIPTION: NORMAL
TCO2 (CALC), ART: ABNORMAL MMOL/L (ref 22–29)
TOTAL CO2, VENOUS: ABNORMAL MMOL/L (ref 23–30)
TOTAL PROTEIN: 6.7 G/DL (ref 6.4–8.3)
TRICHOMONAS: NORMAL
TROPONIN INTERP: ABNORMAL
TROPONIN INTERP: NORMAL
TROPONIN T: ABNORMAL NG/ML
TROPONIN T: NORMAL NG/ML
TROPONIN, HIGH SENSITIVITY: 142 NG/L (ref 0–14)
TROPONIN, HIGH SENSITIVITY: 156 NG/L (ref 0–14)
TROPONIN, HIGH SENSITIVITY: 166 NG/L (ref 0–14)
TROPONIN, HIGH SENSITIVITY: 7 NG/L (ref 0–14)
TURBIDITY: CLEAR
URINE HGB: NEGATIVE
UROBILINOGEN, URINE: NORMAL
WBC # BLD: 15.6 K/UL (ref 3.5–11.3)
WBC # BLD: ABNORMAL 10*3/UL
WBC UA: NORMAL /HPF (ref 0–5)
YEAST: NORMAL

## 2021-06-18 PROCEDURE — 6360000004 HC RX CONTRAST MEDICATION: Performed by: STUDENT IN AN ORGANIZED HEALTH CARE EDUCATION/TRAINING PROGRAM

## 2021-06-18 PROCEDURE — 94761 N-INVAS EAR/PLS OXIMETRY MLT: CPT

## 2021-06-18 PROCEDURE — 87635 SARS-COV-2 COVID-19 AMP PRB: CPT

## 2021-06-18 PROCEDURE — 94002 VENT MGMT INPAT INIT DAY: CPT

## 2021-06-18 PROCEDURE — 70496 CT ANGIOGRAPHY HEAD: CPT

## 2021-06-18 PROCEDURE — 2580000003 HC RX 258: Performed by: STUDENT IN AN ORGANIZED HEALTH CARE EDUCATION/TRAINING PROGRAM

## 2021-06-18 PROCEDURE — 31500 INSERT EMERGENCY AIRWAY: CPT

## 2021-06-18 PROCEDURE — 96365 THER/PROPH/DIAG IV INF INIT: CPT

## 2021-06-18 PROCEDURE — 80177 DRUG SCRN QUAN LEVETIRACETAM: CPT

## 2021-06-18 PROCEDURE — 95711 VEEG 2-12 HR UNMONITORED: CPT

## 2021-06-18 PROCEDURE — 93005 ELECTROCARDIOGRAM TRACING: CPT | Performed by: STUDENT IN AN ORGANIZED HEALTH CARE EDUCATION/TRAINING PROGRAM

## 2021-06-18 PROCEDURE — 85730 THROMBOPLASTIN TIME PARTIAL: CPT

## 2021-06-18 PROCEDURE — 5A1945Z RESPIRATORY VENTILATION, 24-96 CONSECUTIVE HOURS: ICD-10-PCS | Performed by: INTERNAL MEDICINE

## 2021-06-18 PROCEDURE — 70450 CT HEAD/BRAIN W/O DYE: CPT

## 2021-06-18 PROCEDURE — 71045 X-RAY EXAM CHEST 1 VIEW: CPT

## 2021-06-18 PROCEDURE — 2500000003 HC RX 250 WO HCPCS

## 2021-06-18 PROCEDURE — 2000000003 HC NEURO ICU R&B

## 2021-06-18 PROCEDURE — 2700000000 HC OXYGEN THERAPY PER DAY

## 2021-06-18 PROCEDURE — 84520 ASSAY OF UREA NITROGEN: CPT

## 2021-06-18 PROCEDURE — 80053 COMPREHEN METABOLIC PANEL: CPT

## 2021-06-18 PROCEDURE — 85055 RETICULATED PLATELET ASSAY: CPT

## 2021-06-18 PROCEDURE — 84484 ASSAY OF TROPONIN QUANT: CPT

## 2021-06-18 PROCEDURE — 6360000002 HC RX W HCPCS

## 2021-06-18 PROCEDURE — 80051 ELECTROLYTE PANEL: CPT

## 2021-06-18 PROCEDURE — 82330 ASSAY OF CALCIUM: CPT

## 2021-06-18 PROCEDURE — 2500000003 HC RX 250 WO HCPCS: Performed by: EMERGENCY MEDICINE

## 2021-06-18 PROCEDURE — 99254 IP/OBS CNSLTJ NEW/EST MOD 60: CPT | Performed by: INTERNAL MEDICINE

## 2021-06-18 PROCEDURE — 99254 IP/OBS CNSLTJ NEW/EST MOD 60: CPT | Performed by: PSYCHIATRY & NEUROLOGY

## 2021-06-18 PROCEDURE — 85014 HEMATOCRIT: CPT

## 2021-06-18 PROCEDURE — 6360000002 HC RX W HCPCS: Performed by: STUDENT IN AN ORGANIZED HEALTH CARE EDUCATION/TRAINING PROGRAM

## 2021-06-18 PROCEDURE — 99291 CRITICAL CARE FIRST HOUR: CPT

## 2021-06-18 PROCEDURE — 85025 COMPLETE CBC W/AUTO DIFF WBC: CPT

## 2021-06-18 PROCEDURE — 96375 TX/PRO/DX INJ NEW DRUG ADDON: CPT

## 2021-06-18 PROCEDURE — 95714 VEEG EA 12-26 HR UNMNTR: CPT

## 2021-06-18 PROCEDURE — 80048 BASIC METABOLIC PNL TOTAL CA: CPT

## 2021-06-18 PROCEDURE — 82947 ASSAY GLUCOSE BLOOD QUANT: CPT

## 2021-06-18 PROCEDURE — 70551 MRI BRAIN STEM W/O DYE: CPT

## 2021-06-18 PROCEDURE — 82803 BLOOD GASES ANY COMBINATION: CPT

## 2021-06-18 PROCEDURE — 85610 PROTHROMBIN TIME: CPT

## 2021-06-18 PROCEDURE — 36600 WITHDRAWAL OF ARTERIAL BLOOD: CPT

## 2021-06-18 PROCEDURE — 0B21XEZ CHANGE ENDOTRACHEAL AIRWAY IN TRACHEA, EXTERNAL APPROACH: ICD-10-PCS | Performed by: STUDENT IN AN ORGANIZED HEALTH CARE EDUCATION/TRAINING PROGRAM

## 2021-06-18 PROCEDURE — 82565 ASSAY OF CREATININE: CPT

## 2021-06-18 PROCEDURE — 84145 PROCALCITONIN (PCT): CPT

## 2021-06-18 PROCEDURE — 83605 ASSAY OF LACTIC ACID: CPT

## 2021-06-18 PROCEDURE — 81001 URINALYSIS AUTO W/SCOPE: CPT

## 2021-06-18 RX ORDER — SODIUM CHLORIDE 9 MG/ML
25 INJECTION, SOLUTION INTRAVENOUS PRN
Status: DISCONTINUED | OUTPATIENT
Start: 2021-06-18 | End: 2021-06-24 | Stop reason: HOSPADM

## 2021-06-18 RX ORDER — 0.9 % SODIUM CHLORIDE 0.9 %
1000 INTRAVENOUS SOLUTION INTRAVENOUS ONCE
Status: COMPLETED | OUTPATIENT
Start: 2021-06-18 | End: 2021-06-18

## 2021-06-18 RX ORDER — PROPOFOL 10 MG/ML
INJECTION, EMULSION INTRAVENOUS
Status: COMPLETED
Start: 2021-06-18 | End: 2021-06-18

## 2021-06-18 RX ORDER — LEVETIRACETAM 5 MG/ML
500 INJECTION INTRAVASCULAR EVERY 12 HOURS
Status: DISCONTINUED | OUTPATIENT
Start: 2021-06-19 | End: 2021-06-19

## 2021-06-18 RX ORDER — LEVETIRACETAM 10 MG/ML
1000 INJECTION INTRAVASCULAR ONCE
Status: DISCONTINUED | OUTPATIENT
Start: 2021-06-18 | End: 2021-06-19

## 2021-06-18 RX ORDER — MIDAZOLAM HYDROCHLORIDE 2 MG/2ML
2 INJECTION, SOLUTION INTRAMUSCULAR; INTRAVENOUS ONCE
Status: COMPLETED | OUTPATIENT
Start: 2021-06-18 | End: 2021-06-18

## 2021-06-18 RX ORDER — FENTANYL CITRATE 50 UG/ML
50 INJECTION, SOLUTION INTRAMUSCULAR; INTRAVENOUS
Status: DISCONTINUED | OUTPATIENT
Start: 2021-06-18 | End: 2021-06-18

## 2021-06-18 RX ORDER — ONDANSETRON 4 MG/1
4 TABLET, ORALLY DISINTEGRATING ORAL EVERY 8 HOURS PRN
Status: DISCONTINUED | OUTPATIENT
Start: 2021-06-18 | End: 2021-06-18

## 2021-06-18 RX ORDER — SODIUM CHLORIDE 0.9 % (FLUSH) 0.9 %
5-40 SYRINGE (ML) INJECTION EVERY 12 HOURS SCHEDULED
Status: DISCONTINUED | OUTPATIENT
Start: 2021-06-18 | End: 2021-06-22

## 2021-06-18 RX ORDER — SODIUM CHLORIDE 0.9 % (FLUSH) 0.9 %
5-40 SYRINGE (ML) INJECTION PRN
Status: DISCONTINUED | OUTPATIENT
Start: 2021-06-18 | End: 2021-06-24 | Stop reason: HOSPADM

## 2021-06-18 RX ORDER — FENTANYL CITRATE 50 UG/ML
100 INJECTION, SOLUTION INTRAMUSCULAR; INTRAVENOUS
Status: DISCONTINUED | OUTPATIENT
Start: 2021-06-18 | End: 2021-06-24 | Stop reason: HOSPADM

## 2021-06-18 RX ORDER — ONDANSETRON 4 MG/1
4 TABLET, ORALLY DISINTEGRATING ORAL EVERY 8 HOURS PRN
Status: DISCONTINUED | OUTPATIENT
Start: 2021-06-18 | End: 2021-06-24 | Stop reason: HOSPADM

## 2021-06-18 RX ORDER — MIDAZOLAM HYDROCHLORIDE 5 MG/ML
INJECTION INTRAMUSCULAR; INTRAVENOUS
Status: DISPENSED
Start: 2021-06-18 | End: 2021-06-19

## 2021-06-18 RX ORDER — LORAZEPAM 2 MG/ML
INJECTION INTRAMUSCULAR
Status: COMPLETED
Start: 2021-06-18 | End: 2021-06-18

## 2021-06-18 RX ORDER — ASPIRIN 81 MG/1
324 TABLET, CHEWABLE ORAL ONCE
Status: DISCONTINUED | OUTPATIENT
Start: 2021-06-18 | End: 2021-06-24 | Stop reason: HOSPADM

## 2021-06-18 RX ORDER — LEVETIRACETAM 10 MG/ML
1000 INJECTION INTRAVASCULAR ONCE
Status: COMPLETED | OUTPATIENT
Start: 2021-06-18 | End: 2021-06-18

## 2021-06-18 RX ORDER — FENTANYL CITRATE 50 UG/ML
100 INJECTION, SOLUTION INTRAMUSCULAR; INTRAVENOUS ONCE
Status: COMPLETED | OUTPATIENT
Start: 2021-06-18 | End: 2021-06-18

## 2021-06-18 RX ORDER — POLYETHYLENE GLYCOL 3350 17 G/17G
17 POWDER, FOR SOLUTION ORAL DAILY PRN
Status: DISCONTINUED | OUTPATIENT
Start: 2021-06-18 | End: 2021-06-24 | Stop reason: HOSPADM

## 2021-06-18 RX ORDER — ONDANSETRON 2 MG/ML
4 INJECTION INTRAMUSCULAR; INTRAVENOUS EVERY 6 HOURS PRN
Status: DISCONTINUED | OUTPATIENT
Start: 2021-06-18 | End: 2021-06-24 | Stop reason: HOSPADM

## 2021-06-18 RX ORDER — ACETAMINOPHEN 325 MG/1
650 TABLET ORAL EVERY 6 HOURS PRN
Status: DISCONTINUED | OUTPATIENT
Start: 2021-06-18 | End: 2021-06-24 | Stop reason: HOSPADM

## 2021-06-18 RX ORDER — SODIUM CHLORIDE 0.9 % (FLUSH) 0.9 %
5-40 SYRINGE (ML) INJECTION PRN
Status: DISCONTINUED | OUTPATIENT
Start: 2021-06-18 | End: 2021-06-22

## 2021-06-18 RX ORDER — SODIUM CHLORIDE 0.9 % (FLUSH) 0.9 %
5-40 SYRINGE (ML) INJECTION EVERY 12 HOURS SCHEDULED
Status: DISCONTINUED | OUTPATIENT
Start: 2021-06-18 | End: 2021-06-24 | Stop reason: HOSPADM

## 2021-06-18 RX ORDER — PROPOFOL 10 MG/ML
5-50 INJECTION, EMULSION INTRAVENOUS
Status: DISCONTINUED | OUTPATIENT
Start: 2021-06-18 | End: 2021-06-21

## 2021-06-18 RX ORDER — SODIUM CHLORIDE, SODIUM LACTATE, POTASSIUM CHLORIDE, CALCIUM CHLORIDE 600; 310; 30; 20 MG/100ML; MG/100ML; MG/100ML; MG/100ML
INJECTION, SOLUTION INTRAVENOUS CONTINUOUS
Status: DISCONTINUED | OUTPATIENT
Start: 2021-06-18 | End: 2021-06-21

## 2021-06-18 RX ORDER — ONDANSETRON 2 MG/ML
4 INJECTION INTRAMUSCULAR; INTRAVENOUS EVERY 6 HOURS PRN
Status: DISCONTINUED | OUTPATIENT
Start: 2021-06-18 | End: 2021-06-18

## 2021-06-18 RX ORDER — CHLORHEXIDINE GLUCONATE 0.12 MG/ML
15 RINSE ORAL 2 TIMES DAILY
Status: DISCONTINUED | OUTPATIENT
Start: 2021-06-18 | End: 2021-06-19

## 2021-06-18 RX ORDER — ACETAMINOPHEN 325 MG/1
650 TABLET ORAL EVERY 4 HOURS PRN
Status: DISCONTINUED | OUTPATIENT
Start: 2021-06-18 | End: 2021-06-22

## 2021-06-18 RX ORDER — LORAZEPAM 2 MG/ML
INJECTION INTRAMUSCULAR
Status: DISPENSED
Start: 2021-06-18 | End: 2021-06-18

## 2021-06-18 RX ORDER — SODIUM CHLORIDE 9 MG/ML
25 INJECTION, SOLUTION INTRAVENOUS PRN
Status: DISCONTINUED | OUTPATIENT
Start: 2021-06-18 | End: 2021-06-22

## 2021-06-18 RX ORDER — ACETAMINOPHEN 650 MG/1
650 SUPPOSITORY RECTAL EVERY 6 HOURS PRN
Status: DISCONTINUED | OUTPATIENT
Start: 2021-06-18 | End: 2021-06-24 | Stop reason: HOSPADM

## 2021-06-18 RX ADMIN — FENTANYL CITRATE 100 MCG: 50 INJECTION, SOLUTION INTRAMUSCULAR; INTRAVENOUS at 14:38

## 2021-06-18 RX ADMIN — PROPOFOL 20 MCG/KG/MIN: 10 INJECTION, EMULSION INTRAVENOUS at 10:00

## 2021-06-18 RX ADMIN — FENTANYL CITRATE 100 MCG: 50 INJECTION, SOLUTION INTRAMUSCULAR; INTRAVENOUS at 17:09

## 2021-06-18 RX ADMIN — ENOXAPARIN SODIUM 40 MG: 40 INJECTION SUBCUTANEOUS at 12:52

## 2021-06-18 RX ADMIN — PROPOFOL 70 MCG/KG/MIN: 10 INJECTION, EMULSION INTRAVENOUS at 17:10

## 2021-06-18 RX ADMIN — PROPOFOL 40 MCG/KG/MIN: 10 INJECTION, EMULSION INTRAVENOUS at 13:00

## 2021-06-18 RX ADMIN — LORAZEPAM 2 MG: 2 INJECTION INTRAMUSCULAR at 09:42

## 2021-06-18 RX ADMIN — LEVETIRACETAM 1000 MG: 10 INJECTION INTRAVENOUS at 09:43

## 2021-06-18 RX ADMIN — FENTANYL CITRATE 100 MCG: 50 INJECTION, SOLUTION INTRAMUSCULAR; INTRAVENOUS at 13:30

## 2021-06-18 RX ADMIN — PROPOFOL 74.67 MCG/KG/MIN: 10 INJECTION, EMULSION INTRAVENOUS at 22:15

## 2021-06-18 RX ADMIN — Medication 5 MG/HR: at 22:14

## 2021-06-18 RX ADMIN — PROPOFOL 70 MCG/KG/MIN: 10 INJECTION, EMULSION INTRAVENOUS at 19:09

## 2021-06-18 RX ADMIN — FENTANYL CITRATE 50 MCG: 50 INJECTION, SOLUTION INTRAMUSCULAR; INTRAVENOUS at 11:48

## 2021-06-18 RX ADMIN — LORAZEPAM 2 MG: 2 INJECTION INTRAMUSCULAR at 09:59

## 2021-06-18 RX ADMIN — SODIUM CHLORIDE, POTASSIUM CHLORIDE, SODIUM LACTATE AND CALCIUM CHLORIDE: 600; 310; 30; 20 INJECTION, SOLUTION INTRAVENOUS at 12:52

## 2021-06-18 RX ADMIN — ONDANSETRON 4 MG: 2 INJECTION INTRAMUSCULAR; INTRAVENOUS at 14:14

## 2021-06-18 RX ADMIN — SODIUM CHLORIDE, PRESERVATIVE FREE 10 ML: 5 INJECTION INTRAVENOUS at 21:31

## 2021-06-18 RX ADMIN — FAMOTIDINE 20 MG: 10 INJECTION INTRAVENOUS at 21:27

## 2021-06-18 RX ADMIN — FENTANYL CITRATE 100 MCG: 50 INJECTION, SOLUTION INTRAMUSCULAR; INTRAVENOUS at 15:41

## 2021-06-18 RX ADMIN — FENTANYL CITRATE 100 MCG: 50 INJECTION, SOLUTION INTRAMUSCULAR; INTRAVENOUS at 19:10

## 2021-06-18 RX ADMIN — FENTANYL CITRATE 100 MCG: 50 INJECTION, SOLUTION INTRAMUSCULAR; INTRAVENOUS at 10:14

## 2021-06-18 RX ADMIN — FENTANYL CITRATE 50 MCG: 50 INJECTION, SOLUTION INTRAMUSCULAR; INTRAVENOUS at 12:50

## 2021-06-18 RX ADMIN — FENTANYL CITRATE 100 MCG: 50 INJECTION, SOLUTION INTRAMUSCULAR; INTRAVENOUS at 18:12

## 2021-06-18 RX ADMIN — SODIUM CHLORIDE 1000 ML: 9 INJECTION, SOLUTION INTRAVENOUS at 09:53

## 2021-06-18 RX ADMIN — FAMOTIDINE 20 MG: 10 INJECTION INTRAVENOUS at 12:51

## 2021-06-18 RX ADMIN — Medication 5 MG/HR: at 15:42

## 2021-06-18 RX ADMIN — IOPAMIDOL 90 ML: 755 INJECTION, SOLUTION INTRAVENOUS at 09:36

## 2021-06-18 RX ADMIN — MIDAZOLAM HYDROCHLORIDE 2 MG: 1 INJECTION, SOLUTION INTRAMUSCULAR; INTRAVENOUS at 14:15

## 2021-06-18 RX ADMIN — FENTANYL CITRATE 100 MCG: 50 INJECTION, SOLUTION INTRAMUSCULAR; INTRAVENOUS at 21:27

## 2021-06-18 ASSESSMENT — PULMONARY FUNCTION TESTS
PIF_VALUE: 14
PIF_VALUE: 13
PIF_VALUE: 9
PIF_VALUE: 16
PIF_VALUE: 16
PIF_VALUE: 12

## 2021-06-18 ASSESSMENT — PAIN SCALES - GENERAL
PAINLEVEL_OUTOF10: 0
PAINLEVEL_OUTOF10: 10
PAINLEVEL_OUTOF10: 4
PAINLEVEL_OUTOF10: 10
PAINLEVEL_OUTOF10: 10
PAINLEVEL_OUTOF10: 0
PAINLEVEL_OUTOF10: 10

## 2021-06-18 NOTE — ED NOTES
Pt arrives to ed via EMS from home, pt taken straight to ct scanner for cva.      Rivera Davila RN  06/18/21 [id number]

## 2021-06-18 NOTE — ED NOTES
Pt actively vomiting, pt cleaned up, given 4mg zofran. OG hooked to suction at this time. Pt moving around, given versed IVP.       Jacque Tejeda RN  06/18/21 0665

## 2021-06-18 NOTE — ED NOTES
Pt remains resting quietly on cart, nondistressed  VSS  EEG remains in place  Continues to wait for room assignment for admission  Son reports he is going home to shower and will return shortly    Will continue to monitor patient     Autumn Claudio RN  06/18/21 [id number]

## 2021-06-18 NOTE — ED NOTES
Seizure episode,  Ativan 2 mg given IVP  Ativan 4 mg given per Marivel Maya RN IVP, Per Dr Charu Stearns verbal orders      Jairon Pozo RN  06/18/21 3617

## 2021-06-18 NOTE — ED NOTES
Neuro critical care paged  Son concerned that patient is having decreased responses to sensations    Son states that patient is not having responses when he touches her L foot but responds when he touches R foot      Neuro critical care calls back, Doris Zavala speaks with resident and updates resident on son's concerns  Family updated on response from resident    Pt remains stable, VSS  [facility], [address] [place] [address]  06/18/21 [id number]

## 2021-06-18 NOTE — ED NOTES
Pt remains calm, intubated  Son remains present at bedside    Non distressed  VSS  Awaiting room assignment    Will continue to monitor and update     Leonardo Davis RN  06/18/21 0504

## 2021-06-18 NOTE — ED NOTES
Returned from MRI to room 24, son and ex  at bedside at this time     Rhina Roland, [facility]  06/18/21 1120

## 2021-06-18 NOTE — ED NOTES
Pt placed on cardiac monitor, BP cuff, and pulse ox. Alarms set.       Hermelinda Kwong RN  06/18/21 6794

## 2021-06-18 NOTE — FLOWSHEET NOTE
Encounter 45 minutes   Spiritual Assessment Completed Yes   Crisis   Type Stroke Alert   Assessment Approachable; Anxious; Tearful;Fearful; Angry; Shock   Intervention Active listening;Prayer;Sustaining presence/ Ministry of presence;Provide update during procedure   Outcome Expressed gratitude;Venting emotion; Tearful

## 2021-06-18 NOTE — ED NOTES
Writer and DORINA at bedside with patient  Patient noted to have intermittent apneic episodes.   Dr Luis Fernando Carranza and resident called to bedside     Intubation imminent      Stacey Carty RN  06/18/21 8766

## 2021-06-18 NOTE — ED NOTES
ET tube replacement successful  Pt tolerated well    Son remains present at bedside    Will continue to update accordingly      Cordell Briceno RN  06/18/21 [id number]

## 2021-06-18 NOTE — ED NOTES
Writer speaks with pt's son and daughter   Son updated on plan of care and awaiting room assignment in NICU    VSS  Non distressed  Patient remains on EEG  Will continue to monitor       Leonardo Davis RN  06/18/21 9086

## 2021-06-18 NOTE — PROGRESS NOTES
ETT tube cuff was ruptured, Tube exchanged over COOK catheter by Dr. Osei Milian with this RT and attending  Dr. Guillaume Otoole at bedside. Exchange went smoothly with a 7.5 being placed good breath sound bilaterally and optimum tidal volume being achieved. SPo2 100% .

## 2021-06-18 NOTE — CONSULTS
Department of Endovascular Neurosurgery  Resident Consult Note  Stroke Alert paged @ 910AM  ER Room # 24  Arrival to patient bedside @ 912AM        Reason for Consult:  Stroke alert   Requesting Physician:  Dr. Belgica Lei  Endovascular Neurosurgeon:   []Dr. Magali Felder  [x]Dr. Irlanda Moss  []Dr. Jolie Ross     History Obtained From:  family member - son, EMS, ED staff    CHIEF COMPLAINT:       Rt sided weakness and seizure activity    HISTORY OF PRESENT ILLNESS:       The patient is a 60 y.o. female with history of diabetes, GERD, metastatic ovarian cancer, prior history of seizures secondary to PRES, acute DVT of left leg from March 2021 presently on Eliquis who presents to the ED via EMS as a RACE alert. Last know well: 7:35AM per son, 6/18/2021    Per son, he was talking to his mother and something felt off. Patient became unresponsive and had right-sided weakness and right-sided gaze deviation. In route EMS noted initial tonic-clonic seizure. Followed by a second and was administered 2 mg of Versed. EMS also reported the patient had episode of V. tach that spontaneously converted back. Of note, patient does have a history of PRES and is on Keppra 1.5 g twice daily    On presentation:  BP: 157/103  BSL: 159    Prior to arrival patient was on  Antiplatelets/anticoagulants: eliquis  Statins: no    Smoking history: smoker  (1 ppd x many yrs)       PAST MEDICAL HISTORY :       Past Medical History:        Diagnosis Date    Anemia     Bleeding 10/2020    intra-abdominal bleeding -due to splenic mass with GI infiltration. Status post embolization    [facility]     Depression     Diabetes mellitus ([address]     GERD (gastroesophageal reflux disease)     Hx of blood clots     Hypertension     Metastatic cancer ([address] 10/2020    extensive intraabdominal and splenic involvement and lung mets.     Ovarian cancer ([address]     low grade serous ovarian carcinoma    Post chemo evaluation     2007: Chemo via med onc (Dr. Carmen Rangel), 2008: Chemo due to rising CA-125, 2013: intraperitoneal chemo,12/2015: Ca125 - 25     Splenic lesion        Past Surgical History:        Procedure Laterality Date    ABSCESS DRAINAGE  2013    Franca rectal    ANUS SURGERY      ANAL FISSURECTOMY    CARDIAC CATHETERIZATION      COLECTOMY  03/2013    ex lap, tumor debulking, transverse colectomy w reanastamosis, subgastric omentectomy, intraperitoneal port placement    HYSTERECTOMY, TOTAL ABDOMINAL      IR EMBOLIZATION HEMORRHAGE  10/05/2020    intra-abdominal bleeding -due to splenic mass with GI infiltration. Status post embolization [employer] interlock coils x7. mri condtional 3t ok, safe immediately post implant.  IR PORT PLACEMENT EQUAL OR GREATER THAN 5 YEARS  08/24/2020    IR PORT PLACEMENT EQUAL OR GREATER THAN 5 YEARS 8/24/2020 Nusrat Orr MD [facility]    PORT SURGERY      IP Port    TONSILLECTOMY         Social History:   Social History     Socioeconomic History    Marital status: Single     Spouse name: Not on file    Number of children: Not on file    Years of education: Not on file    Highest education level: Not on file   Occupational History    Not on file   Tobacco Use    Smoking status: Current Every Day Smoker     Packs/day: 1.00     Types: Cigarettes    Smokeless tobacco: Current User   Vaping Use    Vaping Use: Never used   Substance and Sexual Activity    Alcohol use: Not Currently    Drug use: Never    Sexual activity: Not on file   Other Topics Concern    Not on file   Social History Narrative    Not on file     Social Determinants of Health     Financial Resource Strain:     Difficulty of Paying Living Expenses:    Food Insecurity:     Worried About Running Out of Food in the Last Year:     Cierra of Food in the Last Year:    Transportation Needs:     Lack of Transportation (Medical):      Lack of Transportation (Non-Medical):    Physical Activity:     Days of Exercise per Week:     Minutes of Exercise per Session:    Stress:     Feeling of Stress :    Social Connections:     Frequency of Communication with Friends and Family:     Frequency of Social Gatherings with Friends and Family:     Attends [employer] Services:     Active Member of Clubs or Organizations:     Attends Club or Organization Meetings:     Marital Status:    Intimate Partner Violence:     Fear of Current or Ex-Partner:     Emotionally Abused:     Physically Abused:     Sexually Abused:        Family History:       Problem Relation Age of Onset    Alcohol Abuse Mother     Cirrhosis Mother        Allergies:  Ceftriaxone    Home Medications:  Prior to Admission medications    Medication Sig Start Date End Date Taking? Authorizing Provider   morphine (MS CONTIN) 15 MG extended release tablet Take 1 tablet by mouth 2 times daily for 30 days. 6/7/21 7/7/21  Yahaira Waterman MD   sertraline (ZOLOFT) 100 MG tablet Take 1 tablet by mouth daily 6/7/21 7/7/21  Yahaira Waterman MD   metoprolol succinate (TOPROL XL) 25 MG extended release tablet Take 1 tablet by mouth daily 6/7/21   Yahaira Waterman MD   ELIQUIS 5 MG TABS tablet Take 1 tablet by mouth daily 5/26/21   Historical Provider, MD   oxyCODONE-acetaminophen (PERCOCET) 5-325 MG per tablet TAKE 1 TABLET BY MOUTH EVERY 4 HOURS AS NEEDED FOR PAIN (BREAKTHROUGH PAIN) FOR UP TO 30 DAYS.  5/19/21 6/18/21  Yahaira Waterman MD   pantoprazole (PROTONIX) 40 MG tablet Take 1 tablet by mouth 2 times daily 5/10/21   Yahaira Waterman MD   levETIRAcetam (KEPPRA) 750 MG tablet Take 2 tablets by mouth 2 times daily 5/3/21   Yahaira Waterman MD   ferrous sulfate (FE TABS 325) 325 (65 Fe) MG EC tablet Take 1 tablet by mouth daily (with breakfast) 3/12/21   ESTRELLITA Ly NP   metFORMIN (GLUCOPHAGE-XR) 500 MG extended release tablet Take 2 tablets by mouth twice daily 2/26/21   Yahaira Waterman MD   aspirin 81 MG chewable tablet Take 81 mg by mouth nightly Pt not taking    Historical Provider, MD       Current Medications:   Current Facility-Administered Medications: iopamidol (ISOVUE-370) 76 % injection 90 mL, 90 mL, Intravenous, ONCE PRN  levETIRAcetam (KEPPRA) 1000 mg/100 mL IVPB, 1,000 mg, Intravenous, Once    REVIEW OF SYSTEMS:       Unable to obtain due to condition  pertinent mentioned in HPI    PHYSICAL EXAM:       BP (!) 157/103   Pulse 133   Resp 15   SpO2 99%     CONSTITUTIONAL:  Well developed, well nourished, lethargic, in no acute distress. nontoxic. No dysarthria, + aphasia. HEAD:  normocephalic, atraumatic    EYES:  PERRLA, Rt gaze   ENT:  moist mucous membranes   NECK:  supple, symmetric, no midline tenderness to palpation    BACK:  without midline tenderness, step-offs or deformities    LUNGS:  Equal air entry bilaterally   CARDIOVASCULAR:  normal s1 / s2   ABDOMEN:  Soft, no rigidity   NEUROLOGIC:  Mental Status:  Lethargic             Cranial Nerves:    cranial nerves II-XII are grossly intact    Motor Exam:    Drift:  present - RUE/RLE  Tone:  normal    Motor exam is 3 out of 5 right upper and right lower extremities (initially 0 and then withdrawal to pain)  Motor exam is 5 out of 5 left upper and left lower extremities    Sensory:    Touch:    Right Upper Extremity:  normal  Left Upper Extremity:  normal  Right Lower Extremity:  normal  Left Lower Extremity:  normal    Deep Tendon Reflexes:    Right Bicep:  2+  Left Bicep:  2+  Right Knee:  2+  Left Knee:  2+    Plantar Response:  Right:  downgoing  Left:  downgoing    Clonus:  N/A  Spear's:  N/A    Coordination/Dysmetria:  Finger to Nose:   Unable to assess  Dysdiadochokinesia:  N/A    Gait:  Not tested    INITIAL NIH STROKE SCALE:    Time Performed:  924 AM     1a. Level of consciousness:  2 - not alert, requires repeated stimulation to attend, or is obtunded and requires strong or painful stimulation to make movements (not stereotyped)   1b.   Level of consciousness questions:  2 - answers neither question correctly  1c. Level of consciousness questions:  2 - performs neither task correctly  2. Best Gaze:  2 - forced deviation, or total gaze paresis not overcome by oculocephalic maneuver  3. Visual:  0 - no visual loss  4. Facial Palsy:  0 - normal symmetric movement  5a. Motor left arm:  1 - drift, limb holds 90 (or 45) degrees but drifts down before full 10 seconds: does not hit bed  5b. Motor right arm:  2 - some effort against gravity, limb cannot get to or maintain (if cued) 90 (or 45) degrees, drifts down to bed, but has some effort against gravity   6a. Motor left le - drift; leg falls by the end of the 5 second period but does not hit bed  6b. Motor right le - some effort against gravity; leg falls to bed by 5 seconds but has some effort against gravity  7. Limb Ataxia:  1 - present in one limb  8. Sensory:  0 - normal; no sensory loss  9. Best Language:  2 - severe aphasia; all communication is through fragmentary expression; great need for inference, questioning, and guessing by the listener. Range of information that can be exchanged is limited; listener carries burden of communication. Examiner cannot identify materials provided from patient response. 10.  Dysarthria:  UN - intubated or other physical barrier  11.   Extinction and Inattention:  0 - no abnormality    TOTAL:  17     SKIN:  no rash      Modified Huntingdon Score Scale:     [] Zero: No symptoms at all   [] 1: No significant disability despite symptoms; able to carry out all usual duties and activities   [x] 2: Slight disability; unable to carry out all previous activities, but able to look after own affairs without assistance   [] 3:Moderate disability; requiring some help, but able to walk without assistance   [] 4: Moderately severe disability; unable to walk and attend to bodily needs without assistance   [] 5:Severe disability; bedridden, incontinent and requiring constant nursing care and Jim Blair MD   PGY 2 Neurology Resident  6/18/2021 at 9:14 AM

## 2021-06-18 NOTE — H&P
Food in the Last Year:    [address] in the Last Year:    Transportation Needs:     Lack of Transportation (Medical):  Lack of Transportation (Non-Medical):    Physical Activity:     Days of Exercise per Week:     Minutes of Exercise per Session:    Stress:     Feeling of Stress :    Social Connections:     Frequency of Communication with Friends and Family:     Frequency of Social Gatherings with Friends and Family:     Attends [employer] Services:     Active Member of Clubs or Organizations:     Attends Club or Organization Meetings:     Marital Status:    Intimate Partner Violence:     Fear of Current or Ex-Partner:     Emotionally Abused:     Physically Abused:     Sexually Abused:        Family History:       Problem Relation Age of Onset    Alcohol Abuse Mother     Cirrhosis Mother        Allergies:    Ceftriaxone    Medications Prior to Admission:    Not in a hospital admission. Current Medications:  Current Facility-Administered Medications: propofol 1000 MG/100ML injection, , ,   fentaNYL (SUBLIMAZE) injection 50 mcg, 50 mcg, Intravenous, Q1H PRN  chlorhexidine (PERIDEX) 0.12 % solution 15 mL, 15 mL, Mouth/Throat, BID  famotidine (PEPCID) injection 20 mg, 20 mg, Intravenous, BID  LORazepam (ATIVAN) 2 MG/ML injection, , ,     REVIEW OF SYSTEMS   Intubated.  Unable to obtain    PHYSICAL EXAM:     /65   Pulse 99   Resp 20   Ht 5' 2\" (1.575 m)   Wt 165 lb 5.5 oz (75 kg)   SpO2 100%   BMI 30.24 kg/m²     PHYSICAL EXAM:  GENERAL: Intubated, sedated on propofol   HENT: normocephalic , nose normal  EYES: no occular discharge, no scleral icterus  NECK: no JVD, no tracheal deviation  CV: Normal S1 S2  PULM / CHEST: Clear to auscultation bilaterally, equal chest rise  ABDOMEN: soft, no rigidity  MSK: no gross deformity  NEURO: Examination limited by propofol infusion at this time we will continue with propofol and obtain EEG to eval for any underlying subclinical seizures prior to weaning propofol and obtaining accurate neurologic examination  SKIN: no rash, no erythema, cap refill < 2 sec    LABS AND IMAGING:     RECENT LABS:  CBC with Differential:    Lab Results   Component Value Date    WBC 15.6 06/18/2021    RBC 4.13 06/18/2021    HGB 8.9 06/18/2021    HCT 34.3 06/18/2021    PLT See Reflexed IPF Result 06/18/2021    MCV 83.1 06/18/2021    MCH 21.5 06/18/2021    MCHC 25.9 06/18/2021    RDW 28.4 06/18/2021    NRBC 1 06/18/2021    LYMPHOPCT 27 06/18/2021    MONOPCT 11 06/18/2021    BASOPCT 1 06/18/2021    MONOSABS 1.72 06/18/2021    LYMPHSABS 4.21 06/18/2021    EOSABS 0.94 06/18/2021    BASOSABS 0.16 06/18/2021    DIFFTYPE NOT REPORTED 06/18/2021     BMP:    Lab Results   Component Value Date     06/18/2021    K 3.9 06/18/2021     06/18/2021    CO2 18 06/18/2021    BUN 8 06/18/2021    LABALBU 4.0 06/18/2021    CREATININE 0.43 06/18/2021    CALCIUM 8.7 06/18/2021    GFRAA >60 06/18/2021    LABGLOM >60 06/18/2021    GLUCOSE 161 06/18/2021       RADIOLOGY:   CT Head WO Contrast    Result Date: 6/18/2021  EXAMINATION: CT OF THE HEAD WITHOUT CONTRAST  6/18/2021 9:19 am TECHNIQUE: CT of the head was performed without the administration of intravenous contrast. Dose modulation, iterative reconstruction, and/or weight based adjustment of the mA/kV was utilized to reduce the radiation dose to as low as reasonably achievable. COMPARISON: 03/09/2021 HISTORY: ORDERING SYSTEM PROVIDED HISTORY: stroke TECHNOLOGIST PROVIDED HISTORY: stroke Decision Support Exception - unselect if not a suspected or confirmed emergency medical condition->Emergency Medical Condition (MA) FINDINGS: BRAIN/VENTRICLES: There is no acute infarct or acute intracranial hemorrhage present. There is no mass effect or midline shift present. There is no ventriculomegaly or abnormal extra-axial fluid collection present. ORBITS: Limited evaluation of the orbits is unremarkable.  SINUSES: The paranasal sinuses and mastoid air cells are clear. SOFT TISSUES/SKULL:  No lytic or blastic osseous lesions are identified. No acute intracranial process identified. The findings were sent to the Radiology Results [address] at 9:41 am on 6/18/2021to be communicated to the Stroke Neurology service. XR CHEST PORTABLE    Result Date: 6/18/2021  EXAMINATION: ONE XRAY VIEW OF THE CHEST 6/18/2021 10:22 am COMPARISON: AP chest from 03/09/2021; CT chest from 03/09/2021 HISTORY: ORDERING SYSTEM PROVIDED HISTORY: post intubation TECHNOLOGIST PROVIDED HISTORY: post intubation History of ovarian cancer and osseous metastases with previous splenic embolization. FINDINGS: ETT tip position satisfactory approximately 4 cm above the alejandro. Enteric tube tip and side hole project below left hemidiaphragm. Right IJ port in unchanged position, with good tip position in the upper right atrium. Coils splenic artery LUQ. Enlarged but stable appearing cardiac silhouette. Mediastinal structures appropriate for slight rotation to the left. Low lung volumes; mildly increased interstitial markings with scattered parenchymal densities and unchanged increased hilar shadows, L>R. Minimal unchanged blunting left costophrenic angle. No new pulmonary or right pleural abnormality. Scattered bony sclerotic foci, compatible with known Mets. No destructive lesion or obvious interval change. Support tubes satisfactory. Similar enlarged susan (known adenopathy), scattered parenchymal densities (known pulmonary metastases) without significant interval change; probable smaller left pleural effusion. CTA HEAD NECK W CONTRAST    Result Date: 6/18/2021  EXAMINATION: CTA OF THE HEAD AND NECK WITH CONTRAST 6/18/2021 9:19 am: TECHNIQUE: CTA of the head and neck was performed with the administration of intravenous contrast. Multiplanar reformatted images are provided for review. MIP images are provided for review.  Stenosis of the internal carotid arteries There is no mass effect or midline shift. There is no vascular malformation identified. 3D surface reformations were performed and concur with the above findings. 1. No large vessel occlusion, significant stenosis or cerebral aneurysm identified. 2. No significant arterial stenosis identified within the neck. 3. Several foci of gas lucency within the lower left neck. Correlation with any history of attempted left central venous catheter placement is recommended. MRI LIMITED BRAIN    Result Date: 6/18/2021  EXAMINATION: MRI OF THE BRAIN WITHOUT CONTRAST  6/18/2021 11:03 am TECHNIQUE: Multiplanar multisequence MRI of the brain was performed without the administration of intravenous contrast. COMPARISON: October 23, 2020 HISTORY: ORDERING SYSTEM PROVIDED HISTORY: acute right sided weakness, seizures, prior hx of PRES TECHNOLOGIST PROVIDED HISTORY: acute right sided weakness, seizures, prior hx of PRES Reason for Exam: cva, rt sided weakness FINDINGS: INTRACRANIAL STRUCTURES/VENTRICLES: There is no restricted diffusion to suggest acute infarct. No mass effect or midline shift. No evidence of an acute intracranial hemorrhage. The ventricles and sulci are normal in size and configuration. Scattered white matter changes in the subcortical white matter periventricular white matter present. There is no evidence for blood products on gradient imaging. The sellar/suprasellar regions appear unremarkable. The normal signal voids within the major intracranial vessels appear maintained. ORBITS: The visualized portion of the orbits demonstrate no acute abnormality. SINUSES: The visualized paranasal sinuses and mastoid air cells are well aerated. BONES/SOFT TISSUES: The bone marrow signal intensity appears normal. The soft tissues demonstrate no acute abnormality. Mild chronic white matter changes mild volume loss.  No evidence for restricted diffusion to suggest acute infarct           Labs and Images reviewed with:    [x] Ashley Rainey MD    [] Nitish Taylor MD  [] Ginny Funez MD  --[] there are no new interval images to review. ASSESSMENT AND PLAN:         ASSESSMENT: 60 yof status epilepticus, known history of brain cancer with metastasis recently diagnosed with press syndrome. Patient care will be discussed with attending, will reevaluate patient along with attending. PLAN/MEDICAL DECISION MAKING:    NEUROLOGIC:  - Imaging : cta head and neck-No large vessel occlusion, significant stenosis or cerebral aneurysm identified  Cth: No acute intracranial process identified. MRI: Mild chronic white matter changes mild volume loss. No evidence for restricted diffusion to suggest acute infarct   - AEDs with 1 g Keppra   Will add additional gram and continue 500mg BID dosing  - Goal SBP normotensive  - Neuro checks per protocol    CARDIOVASCULAR:  - Goal SBP normotensive  - Continue telemetry    PULMONARY:  Vent Information  Skin Assessment: Clean, dry, & intact  Suction Catheter Diameter: 14  Vent Type: Servo i  Vent Mode: PRVC  Vt Ordered: 400 mL  Rate Set: 16 bmp  FiO2 : 60 %  SpO2: 100 %  SpO2/FiO2 ratio: 166.67  Sensitivity: 5  PEEP/CPAP: 8  I Time/ I Time %: 0.9 s  Humidification Source: HME  Nitric Oxide/Epoprostenol In Use?: No  Additional Respiratory  Assessments  Pulse: 100  Resp: 23  SpO2: 100 %  Position: Supine  Humidification Source: HME  Oral Care Completed?: Yes  Oral Care: Mouth suctioned  Xr:   Support tubes satisfactory.  Similar enlarged susan (known adenopathy),   scattered parenchymal densities (known pulmonary metastases) without   significant interval change; probable smaller left pleural effusion.          RENAL/FLUID/ELECTROLYTE:  - BUN 8/ Creatinine 0.43  - IVF: LR at 100cc/hr  - Replace electrolytes PRN  - Daily BMP    GI/NUTRITION:  NUTRITION:  Diet NPO  - Bowel regimen: glycolax  - GI prophylaxis: pepcid bid    ID:  - Tmax; afebrile  - WBC 15.6  -fu procal  - Continue to monitor for fevers  - Daily CBC    HEME:   - H&H 8.9/34.3  - Platelets 8125   -known thrombocytosis   - Daily CBC    ENDOCRINE:  - Continue to monitor blood glucose, goal <180      OTHER:  - PT/OT/ST as tolerated/appropriate    PROPHYLAXIS:  Stress ulcer: H2 blocker    DVT PROPHYLAXIS:  lovenox 40    DISPOSITION: to ICU      Puja Polanco DO  Neuro Critical Care Service   Pager [phone]  6/18/2021     11:41 AM

## 2021-06-18 NOTE — ED NOTES
Pt waking up, moving around bed, biting down on tube. Pts propofol gtt increased at this time. Will notify admitting team for further orders at this time. Propofol gtt going at 70mcg/kg/min.       Gonzalez Rosas RN  06/18/21 [id number]

## 2021-06-18 NOTE — ED NOTES
Dr. Guillaume Otoole at bedside upon pt arrival to evaluate pt.  Danyel Chadwick RN, Blake Eng RN at bedside with neuro resident upon arrival to evaluate pt        Memo Mohan RN  06/18/21 8426

## 2021-06-18 NOTE — CONSULTS
Today's Date: 6/18/2021  Patient Name: Sharri Hrovath  Date of admission: 6/18/2021  9:17 AM  Patient's age: 60 y.o., 1965  Admission Dx: Status epilepticus ([address] [G40.901]    Reason for Consult: management recommendations  Requesting Physician: Ryan Carter MD    CHIEF COMPLAINT:      History Obtained From:  electronic medical record    HISTORY OF PRESENT ILLNESS:      The patient is a 60 y.o.  female who is admitted to the hospital for evaluation of unresponsiveness and concern regarding seizures. Patient admitted to the hospital due to concerns regarding seizures. Patient was found unresponsive by her son. Patient does have history of seizures has been on Keppra. Patient was also recently admitted to the neurology service for crest syndrome. Patient was noted to have a episode of seizure in route with ems. Patient also takes Eliquis for history of blood clots. In the ER patient was noted to have 2 more episodes of seizures. Patient MRI brain as well as CTA were unremarkable in regards to any acute event patient is currently being worked up by neurology for status epilepticus    Patient is known to our service. She has history of recurrent ovarian cancer. Original diagnosis was in 2005. Patient has been treated with Doxil Avastin in the past.  More recently she is being treated with gemcitabine patient last chemotherapy treatment was on 6/4/2021. Patient received cycle 5-day 8 of Gemzar. Lab work-up shows platelet count of 1.3  WBC count is 15 hemoglobin is 8.9. Recent iron studies show ferritin of 9. Patient is intubated at the time of evaluation and undergoing EEG. Past Medical History:   has a past medical history of Anemia, Bleeding, Cervical cancer ([address] Depression, Diabetes mellitus ([address] GERD (gastroesophageal reflux disease), Hx of blood clots, Hypertension, Metastatic cancer ([address] Ovarian cancer ([address] Post chemo evaluation, and Splenic lesion.     Past Surgical History:   has a past surgical history that includes Hysterectomy, total abdominal; Port Surgery; Tonsillectomy; IR PORT PLACEMENT > 5 YEARS (08/24/2020); Anus surgery; Abscess Drainage (2013); colectomy (03/2013); IR EMBOLIZATION HEMORRHAGE (10/05/2020); and Cardiac catheterization. Medications:    Prior to Admission medications    Medication Sig Start Date End Date Taking? Authorizing Provider   morphine (MS CONTIN) 15 MG extended release tablet Take 1 tablet by mouth 2 times daily for 30 days. 6/7/21 7/7/21  Ron Sarah MD   sertraline (ZOLOFT) 100 MG tablet Take 1 tablet by mouth daily 6/7/21 7/7/21  Ron Sarah, MD   metoprolol succinate (TOPROL XL) 25 MG extended release tablet Take 1 tablet by mouth daily 6/7/21   Felixral Route, MD   ELIQUIS 5 MG TABS tablet Take 1 tablet by mouth daily 5/26/21   Historical Provider, MD   oxyCODONE-acetaminophen (PERCOCET) 5-325 MG per tablet TAKE 1 TABLET BY MOUTH EVERY 4 HOURS AS NEEDED FOR PAIN (BREAKTHROUGH PAIN) FOR UP TO 30 DAYS.  5/19/21 6/18/21  Ron Sarah MD   pantoprazole (PROTONIX) 40 MG tablet Take 1 tablet by mouth 2 times daily 5/10/21   Felixral Tyrel, MD   levETIRAcetam (KEPPRA) 750 MG tablet Take 2 tablets by mouth 2 times daily 5/3/21   Ron Sarah MD   ferrous sulfate (FE TABS 325) 325 (65 Fe) MG EC tablet Take 1 tablet by mouth daily (with breakfast) 3/12/21   ESTRELLITA Vivas NP   metFORMIN (GLUCOPHAGE-XR) 500 MG extended release tablet Take 2 tablets by mouth twice daily 2/26/21   Ron Sarah MD   aspirin 81 MG chewable tablet Take 81 mg by mouth nightly Pt not taking    Historical Provider, MD     Current Facility-Administered Medications   Medication Dose Route Frequency Provider Last Rate Last Admin    chlorhexidine (PERIDEX) 0.12 % solution 15 mL  15 mL Mouth/Throat BID Ainsley Garrison MD        famotidine (PEPCID) injection 20 mg  20 mg Intravenous BID Ainsley Garrison MD   20 mg at 06/18/21 1251    LORazepam (ATIVAN) 2 MG/ML injection             propofol injection  5-50 mcg/kg/min Intravenous Titrated Lacy Marie MD 31.5 mL/hr at 06/18/21 1710 70 mcg/kg/min at 06/18/21 1710    sodium chloride flush 0.9 % injection 5-40 mL  5-40 mL Intravenous 2 times per day Lacy Marie MD        sodium chloride flush 0.9 % injection 5-40 mL  5-40 mL Intravenous PRN Lcay Marie MD        0.9 % sodium chloride infusion  25 mL Intravenous PRN Lacy Marie MD        acetaminophen (TYLENOL) tablet 650 mg  650 mg Oral Q4H PRN Lacy Marie MD        sodium chloride flush 0.9 % injection 5-40 mL  5-40 mL Intravenous 2 times per day Chris Constantine, DO        sodium chloride flush 0.9 % injection 5-40 mL  5-40 mL Intravenous PRN Chris Santanay, DO        0.9 % sodium chloride infusion  25 mL Intravenous PRN Chris Santanay, DO        enoxaparin (LOVENOX) injection 40 mg  40 mg Subcutaneous Daily Nick Frazier, DO   40 mg at 06/18/21 1252    ondansetron (ZOFRAN-ODT) disintegrating tablet 4 mg  4 mg Oral Q8H PRN Chris Constantine, DO        Or    ondansetron (ZOFRAN) injection 4 mg  4 mg Intravenous Q6H PRN Chris Constantine, DO   4 mg at 06/18/21 1414    polyethylene glycol (GLYCOLAX) packet 17 g  17 g Oral Daily PRN Chris Constantine, DO        acetaminophen (TYLENOL) tablet 650 mg  650 mg Oral Q6H PRN Chris Constantine, DO        Or    acetaminophen (TYLENOL) suppository 650 mg  650 mg Rectal Q6H PRN Chris Constantine, DO        lactated ringers infusion   Intravenous Continuous Chris Constantine,  mL/hr at 06/18/21 1252 New Bag at 06/18/21 1252    fentaNYL (SUBLIMAZE) injection 100 mcg  100 mcg Intravenous Q1H PRN Lacy Marie MD   100 mcg at 06/18/21 1812    levETIRAcetam (KEPPRA) 1000 mg/100 mL IVPB  1,000 mg Intravenous Once Chris Fitch, DO   Held at 06/18/21 1422    [START ON 6/19/2021] levETIRAcetam (KEPPRA) 500 mg/100 mL IVPB  500 mg Intravenous Q12H Chris Fitch, DO        midazolam HCl (VERSED) 10 MG/2ML injection             aspirin chewable tablet 324 mg  324 mg Oral Once Nick Frazier DO        midazolam (VERSED) 1 mg/mL in D5W infusion  1-10 mg/hr Intravenous Continuous Rickey Pickett DO 5 mL/hr at 06/18/21 1542 5 mg/hr at 06/18/21 1542     Current Outpatient Medications   Medication Sig Dispense Refill    morphine (MS CONTIN) 15 MG extended release tablet Take 1 tablet by mouth 2 times daily for 30 days. 60 tablet 0    sertraline (ZOLOFT) 100 MG tablet Take 1 tablet by mouth daily 30 tablet 3    metoprolol succinate (TOPROL XL) 25 MG extended release tablet Take 1 tablet by mouth daily 30 tablet 3    ELIQUIS 5 MG TABS tablet Take 1 tablet by mouth daily      oxyCODONE-acetaminophen (PERCOCET) 5-325 MG per tablet TAKE 1 TABLET BY MOUTH EVERY 4 HOURS AS NEEDED FOR PAIN (BREAKTHROUGH PAIN) FOR UP TO 30 DAYS. 180 tablet 0    pantoprazole (PROTONIX) 40 MG tablet Take 1 tablet by mouth 2 times daily 60 tablet 0    levETIRAcetam (KEPPRA) 750 MG tablet Take 2 tablets by mouth 2 times daily 240 tablet 2    ferrous sulfate (FE TABS 325) 325 (65 Fe) MG EC tablet Take 1 tablet by mouth daily (with breakfast) 30 tablet 0    metFORMIN (GLUCOPHAGE-XR) 500 MG extended release tablet Take 2 tablets by mouth twice daily 120 tablet 5    aspirin 81 MG chewable tablet Take 81 mg by mouth nightly Pt not taking         Allergies:  Ceftriaxone    Social History:   reports that she has been smoking cigarettes. She has been smoking about 1.00 pack per day. She uses smokeless tobacco. She reports previous alcohol use. She reports that she does not use drugs. Family History: family history includes Alcohol Abuse in her mother; Cirrhosis in her mother.     REVIEW OF SYSTEMS:      Unable to obtain due to altered mental status  PHYSICAL EXAM:        BP (!) 156/78   Pulse 102   Resp 25   Ht 5' 2\" (1.575 m)   Wt 165 lb 5.5 oz (75 kg)   SpO2 100%   BMI 30.24 kg/m²    No data recorded. General appearance ill-appearing. Intubated. Mental status -intubated and sedated  Eyes - pupils equal and reactive, extraocular eye movements intact   Ears - bilateral TM's and external ear canals normal   Mouth -orally intubated  Neck - supple, no significant adenopathy   Lymphatics - no palpable lymphadenopathy, no hepatosplenomegaly   Chest - clear to auscultation, no wheezes, rales or rhonchi, symmetric air entry   Heart - normal rate, regular rhythm, normal S1, S2, no murmurs  Abdomen - soft, nontender, nondistended, no masses or organomegaly   Neurological -intubated and sedated  Musculoskeletal - no joint tenderness, deformity or swelling   Extremities - peripheral pulses normal, no pedal edema, no clubbing or cyanosis   Skin - normal coloration and turgor, no rashes, no suspicious skin lesions noted ,      DATA:      Labs:     Results for orders placed or performed during the hospital encounter of 06/18/21   COVID-19, Rapid    Specimen: Nasopharyngeal Swab   Result Value Ref Range    Specimen Description . NASOPHARYNGEAL SWAB     SARS-CoV-2, Rapid Not Detected Not Detected   CBC Auto Differential   Result Value Ref Range    WBC 15.6 (H) 3.5 - 11.3 k/uL    RBC 4.13 3.95 - 5.11 m/uL    Hemoglobin 8.9 (L) 11.9 - 15.1 g/dL    Hematocrit 34.3 (L) 36.3 - 47.1 %    MCV 83.1 82.6 - 102.9 fL    MCH 21.5 (L) 25.2 - 33.5 pg    MCHC 25.9 (L) 28.4 - 34.8 g/dL    RDW 28.4 (H) 11.8 - 14.4 %    Platelets See Reflexed IPF Result 138 - 453 k/uL    MPV NOT REPORTED 8.1 - 13.5 fL    NRBC Automated 0.0 0.0 per 100 WBC    Differential Type NOT REPORTED     WBC Morphology NOT REPORTED     RBC Morphology NOT REPORTED     Platelet Estimate NOT REPORTED     Immature Granulocytes 0 0 %    Seg Neutrophils 55 36 - 66 %    Lymphocytes 27 24 - 44 %    Monocytes 11 (H) 1 - 7 %    Eosinophils % 6 (H) 1 - 4 %    Basophils 1 0 - 2 %    nRBC 1 (H) 0 per 100 WBC    Absolute Immature Granulocyte 0.00 0.00 - 0.30 k/uL    Segs Absolute 8.57 (H) 1.8 - 7.7 k/uL    Absolute Lymph # 4.21 1.0 - 4.8 k/uL    Absolute Mono # 1.72 (H) 0.1 - 0.8 k/uL    Absolute Eos # 0.94 (H) 0.0 - 0.4 k/uL    Basophils Absolute 0.16 0.0 - 0.2 k/uL    Morphology ANISOCYTOSIS PRESENT     Morphology HYPOCHROMIA PRESENT     Morphology 1+ POLYCHROMASIA     Morphology GIANT PLATELETS PRESENT    Comprehensive Metabolic Panel w/ Reflex to MG   Result Value Ref Range    Glucose 161 (H) 70 - 99 mg/dL    BUN 8 6 - 20 mg/dL    CREATININE 0.43 (L) 0.50 - 0.90 mg/dL    Bun/Cre Ratio NOT REPORTED 9 - 20    Calcium 8.7 8.6 - 10.4 mg/dL    Sodium 140 135 - 144 mmol/L    Potassium 3.9 3.7 - 5.3 mmol/L    Chloride 103 98 - 107 mmol/L    CO2 18 (L) 20 - 31 mmol/L    Anion Gap 19 (H) 9 - 17 mmol/L    Alkaline Phosphatase 64 35 - 104 U/L    ALT 5 5 - 33 U/L    AST 12 <32 U/L    Total Bilirubin <0.10 (L) 0.3 - 1.2 mg/dL    Total Protein 6.7 6.4 - 8.3 g/dL    Albumin 4.0 3.5 - 5.2 g/dL    Albumin/Globulin Ratio 1.5 1.0 - 2.5    GFR Non-African American >60 >60 mL/min    GFR African American >60 >60 mL/min    GFR Comment          GFR Staging NOT REPORTED    APTT   Result Value Ref Range    PTT 21.0 20.5 - 30.5 sec   Protime-INR   Result Value Ref Range    Protime 10.1 9.1 - 12.3 sec    INR 0.9    Troponin   Result Value Ref Range    Troponin, High Sensitivity 7 0 - 14 ng/L    Troponin T NOT REPORTED <0.03 ng/mL    Troponin Interp NOT REPORTED    Urinalysis Reflex to Culture    Specimen: Urine, clean catch   Result Value Ref Range    Color, UA YELLOW YELLOW    Turbidity UA CLEAR CLEAR    Glucose, Ur NEGATIVE NEGATIVE    Bilirubin Urine NEGATIVE NEGATIVE    Ketones, Urine TRACE (A) NEGATIVE    Specific Gravity, UA 1.035 (H) 1.005 - 1.030    Urine Hgb NEGATIVE NEGATIVE    pH, UA 5.0 5.0 - 8.0    Protein, UA TRACE (A) NEGATIVE    Urobilinogen, Urine Normal Normal    Nitrite, Urine NEGATIVE NEGATIVE    Leukocyte Esterase, Urine NEGATIVE NEGATIVE    Urinalysis Comments NOT REPORTED    Lactic mm Hg    pO2, Steffen 77.5 (H) 30 - 50 mm Hg    HCO3, Venous 16.6 (L) 22.0 - 29.0 mmol/L    Total CO2, Venous NOT REPORTED 23.0 - 30.0 mmol/L    Negative Base Excess, Steffen 11 (H) 0.0 - 2.0    Positive Base Excess, Steffen NOT REPORTED 0.0 - 3.0    O2 Sat, Steffen 92 (H) 60.0 - 85.0 %    O2 Device/Flow/% NOT REPORTED     Joe Test NOT REPORTED     Sample Site NOT REPORTED     Mode NOT REPORTED     FIO2 NOT REPORTED     Pt Temp NOT REPORTED     POC pH Temp NOT REPORTED     POC pCO2 Temp NOT REPORTED mm Hg    POC pO2 Temp NOT REPORTED mm Hg   Creatinine W/GFR Point of Care   Result Value Ref Range    POC Creatinine 0.58 0.51 - 1.19 mg/dL    GFR Comment >60 >60 mL/min    GFR Non-African American >60 >60 mL/min    GFR Comment         POCT urea (BUN)   Result Value Ref Range    POC BUN 8 8 - 26 mg/dL   Lactic Acid, POC   Result Value Ref Range    POC Lactic Acid 9.98 (H) 0.56 - 1.39 mmol/L   POCT Glucose   Result Value Ref Range    POC Glucose 159 (H) 74 - 100 mg/dL   Arterial Blood Gas, POC   Result Value Ref Range    POC pH 7.319 (L) 7.350 - 7.450    POC pCO2 42.5 35.0 - 48.0 mm Hg    POC PO2 173.9 (H) 83.0 - 108.0 mm Hg    POC HCO3 21.8 21.0 - 28.0 mmol/L    TCO2 (calc), Art NOT REPORTED 22.0 - 29.0 mmol/L    Negative Base Excess, Art 4 (H) 0.0 - 2.0    Positive Base Excess, Art NOT REPORTED 0.0 - 3.0    POC O2 SAT 99 (H) 94.0 - 98.0 %    O2 Device/Flow/% Adult Ventilator     Joe Test POSITIVE     Sample Site Left Radial Artery     Mode PRVC     FIO2 50.0     Pt Temp NOT REPORTED     POC pH Temp NOT REPORTED     POC pCO2 Temp NOT REPORTED mm Hg    POC pO2 Temp NOT REPORTED mm Hg   EKG 12 Lead   Result Value Ref Range    Ventricular Rate 129 BPM    Atrial Rate 129 BPM    P-R Interval 148 ms    QRS Duration 88 ms    Q-T Interval 316 ms    QTc Calculation (Bazett) 462 ms    P Axis 74 degrees    R Axis 39 degrees    T Axis 92 degrees   EKG 12 Lead   Result Value Ref Range    Ventricular Rate 98 BPM    Atrial Rate 98 BPM    P-R Interval 166 ms    QRS Duration 82 ms    Q-T Interval 362 ms    QTc Calculation (Bazett) 462 ms    P Axis 60 degrees    R Axis 26 degrees    T Axis 74 degrees         IMAGING DATA:    CT Head WO Contrast    Result Date: 6/18/2021  EXAMINATION: CT OF THE HEAD WITHOUT CONTRAST  6/18/2021 9:19 am TECHNIQUE: CT of the head was performed without the administration of intravenous contrast. Dose modulation, iterative reconstruction, and/or weight based adjustment of the mA/kV was utilized to reduce the radiation dose to as low as reasonably achievable. COMPARISON: 03/09/2021 HISTORY: ORDERING SYSTEM PROVIDED HISTORY: stroke TECHNOLOGIST PROVIDED HISTORY: stroke Decision Support Exception - unselect if not a suspected or confirmed emergency medical condition->Emergency Medical Condition (MA) FINDINGS: BRAIN/VENTRICLES: There is no acute infarct or acute intracranial hemorrhage present. There is no mass effect or midline shift present. There is no ventriculomegaly or abnormal extra-axial fluid collection present. ORBITS: Limited evaluation of the orbits is unremarkable. SINUSES: The paranasal sinuses and mastoid air cells are clear. SOFT TISSUES/SKULL:  No lytic or blastic osseous lesions are identified. No acute intracranial process identified. The findings were sent to the Radiology Results [id number] at 9:41 am on 6/18/2021to be communicated to the Stroke Neurology service. XR CHEST PORTABLE    Result Date: 6/18/2021  EXAMINATION: ONE XRAY VIEW OF THE CHEST 6/18/2021 10:22 am COMPARISON: AP chest from 03/09/2021; CT chest from 03/09/2021 HISTORY: ORDERING SYSTEM PROVIDED HISTORY: post intubation TECHNOLOGIST PROVIDED HISTORY: post intubation History of ovarian cancer and osseous metastases with previous splenic embolization. FINDINGS: ETT tip position satisfactory approximately 4 cm above the alejandro. Enteric tube tip and side hole project below left hemidiaphragm.   Right IJ port in unchanged position, with good tip position in the upper right atrium. Coils splenic artery LUQ. Enlarged but stable appearing cardiac silhouette. Mediastinal structures appropriate for slight rotation to the left. Low lung volumes; mildly increased interstitial markings with scattered parenchymal densities and unchanged increased hilar shadows, L>R. Minimal unchanged blunting left costophrenic angle. No new pulmonary or right pleural abnormality. Scattered bony sclerotic foci, compatible with known Mets. No destructive lesion or obvious interval change. Support tubes satisfactory. Similar enlarged susan (known adenopathy), scattered parenchymal densities (known pulmonary metastases) without significant interval change; probable smaller left pleural effusion. CTA HEAD NECK W CONTRAST    Result Date: 6/18/2021  EXAMINATION: CTA OF THE HEAD AND NECK WITH CONTRAST 6/18/2021 9:19 am: TECHNIQUE: CTA of the head and neck was performed with the administration of intravenous contrast. Multiplanar reformatted images are provided for review. MIP images are provided for review. Stenosis of the internal carotid arteries measured using NASCET criteria. Dose modulation, iterative reconstruction, and/or weight based adjustment of the mA/kV was utilized to reduce the radiation dose to as low as reasonably achievable. COMPARISON: CT brain earlier same day HISTORY: ORDERING SYSTEM PROVIDED HISTORY: stroke TECHNOLOGIST PROVIDED HISTORY: stroke Decision Support Exception - unselect if not a suspected or confirmed emergency medical condition->Emergency Medical Condition (MA) FINDINGS: CTA NECK: AORTIC ARCH/ARCH VESSELS: No dissection or arterial injury. No significant stenosis of the brachiocephalic or subclavian arteries. CAROTID ARTERIES: No dissection, arterial injury, or hemodynamically significant stenosis by NASCET criteria. VERTEBRAL ARTERIES: No dissection, arterial injury, or significant stenosis.  SOFT TISSUES: There are multiple foci of soft tissue gas within the lower left neck, likely related to prior central venous line attempt. A right chest Port-A-Cath is present. The lung apices are clear. There is no pathologically enlarged lymphadenopathy or soft tissue mass identified. BONES: No lytic or blastic osseous lesions are identified. There is a posterior disc osteophyte complex at C5-6 resulting in mild spinal canal stenosis and mild-to-moderate bilateral neural foraminal narrowing. 3D surface reformations were performed and concur with the above findings. CTA HEAD: ANTERIOR CIRCULATION: The intracranial segments of the internal carotid arteries are normal.  There is no significant stenosis or aneurysm identified. The anterior and middle cerebral arteries are normal in appearance. No significant stenosis or aneurysm is identified. POSTERIOR CIRCULATION: The distal vertebral arteries are normal in appearance. The basilar artery is normal.  No significant stenosis or aneurysm is identified. The posterior cerebral arteries are normal in appearance. OTHER: No dural venous sinus thrombosis on this non-dedicated study. BRAIN: There is no mass effect or midline shift. There is no vascular malformation identified. 3D surface reformations were performed and concur with the above findings. 1. No large vessel occlusion, significant stenosis or cerebral aneurysm identified. 2. No significant arterial stenosis identified within the neck. 3. Several foci of gas lucency within the lower left neck. Correlation with any history of attempted left central venous catheter placement is recommended.      MRI LIMITED BRAIN    Result Date: 6/18/2021  EXAMINATION: MRI OF THE BRAIN WITHOUT CONTRAST  6/18/2021 11:03 am TECHNIQUE: Multiplanar multisequence MRI of the brain was performed without the administration of intravenous contrast. COMPARISON: October 23, 2020 HISTORY: ORDERING SYSTEM PROVIDED HISTORY: acute right sided weakness, seizures, prior hx of PRES TECHNOLOGIST PROVIDED HISTORY: acute right sided weakness, seizures, prior hx of PRES Reason for Exam: cva, rt sided weakness FINDINGS: INTRACRANIAL STRUCTURES/VENTRICLES: There is no restricted diffusion to suggest acute infarct. No mass effect or midline shift. No evidence of an acute intracranial hemorrhage. The ventricles and sulci are normal in size and configuration. Scattered white matter changes in the subcortical white matter periventricular white matter present. There is no evidence for blood products on gradient imaging. The sellar/suprasellar regions appear unremarkable. The normal signal voids within the major intracranial vessels appear maintained. ORBITS: The visualized portion of the orbits demonstrate no acute abnormality. SINUSES: The visualized paranasal sinuses and mastoid air cells are well aerated. BONES/SOFT TISSUES: The bone marrow signal intensity appears normal. The soft tissues demonstrate no acute abnormality. Mild chronic white matter changes mild volume loss. No evidence for restricted diffusion to suggest acute infarct         IMPRESSION:   Primary Problem  <principal problem not specified>    Active Hospital Problems    Diagnosis Date Noted    Status epilepticus ([facility] [G40.901] 01/04/2021     Altered mental status  Recurrent ovarian cancer  Thrombocytosis  Iron deficiency      RECOMMENDATIONS:  1. I personally reviewed results of lab work-up imaging studies and other relevant clinical data. 2. IV iron. Check peripheral smear  3. Given acute illness hold chemo until patient recovered  4. Thrombocytosis likely reactive in nature iron deficiency also contributing  5. Patient altered mental status and status epilepticus currently being worked up by neurology. If no clear etiology of altered mental status revealed consider MRI brain with and without contrast  6. Continue symptomatic supportive care  7. We will continue to follow. Discussed with  Nurse. Thank you for asking us to see this patient. Saloni Newberry MD          This note is created with the assistance of a speech recognition program.  While intending to generate a document that actually reflects the content of the visit, the document can still have some errors including those of syntax and sound a like substitutions which may escape proof reading. It such instances, actual meaning can be extrapolated by contextual diversion.

## 2021-06-18 NOTE — ED NOTES
Patient to MRI with writer and RT  Pt stable       [facility]  06/18/21 [id number]

## 2021-06-18 NOTE — ED NOTES
Family updated on plan of care and processes  Patient resting on cart  VSS    Call light in reach  Family at [address] [place] [address]  06/18/21 [id number]

## 2021-06-18 NOTE — ED PROVIDER NOTES
120-130  Axis: normal  Ectopy: none  Conduction: normal  ST Segments: no acute change  T Waves: no acute change  Q Waves: none    EKG  Impression: sinus tachycardia    Penny Sanchez MD      Interpreted by me      CRITICAL CARE: There was a high probability of clinically significant/life threatening deterioration in this patient's condition which required my urgent intervention. Total critical care time was 42 minutes. This excludes any time for separately reportable procedures.      Earl Escaimlla MD, Janelle Mendez  Attending Emergency Physician         Penny Sanchez MD  06/18/21 0378

## 2021-06-18 NOTE — PROCEDURES
PROCEDURE NOTE - EMERGENCY INTUBATION    PATIENT NAME: Vicky Cooper [facility]  MEDICAL RECORD NO. [id number]  DATE: 6/18/2021  ATTENDING PHYSICIAN: Dr. Tiff Arguelles DIAGNOSIS:  Acute Respiratory Failure  POSTOPERATIVE DIAGNOSIS:  Same  PROCEDURE PERFORMED:  Emergency endotracheal intubation  PERFORMING PHYSICIAN: Connie Patiño DO    MEDICATIONS: etomidate intravenously and succinycholine intravenously    DISCUSSION:  Patricia Sinha is a 60y.o.-year-old female who requires intubation and ventilatory support due to airway protection and intermittent periods of apnea. The history and physical examination were reviewed and confirmed. CONSENT: The family members were counseled regarding the procedure, its indications, risks, potential complications and alternatives, and any questions were answered. Consent was obtained to proceed. PROCEDURE:  A timeout was initiated by the bedside nurse and was confirmed by those present. The patient was placed in the appropriate position. Cricoid pressure was utilized. Intubation was performed by direct laryngoscopy using a laryngoscope and an 8.0 cuffed endotracheal tube. The cuff was then inflated and the tube was secured appropriately at a distance of 24 cm to the dental ridge. Initial confirmation of placement included bilateral breath sounds, an end tidal CO2 detector, tube fogging, adequate chest rise and adequate pulse oximetry reading. A chest x-ray to verify correct placement of the tube has been ordered but is still pending. The patient tolerated the procedure well.      COMPLICATIONS:  None     Connie Patiño DO  10:11 AM, 6/18/21

## 2021-06-18 NOTE — ED NOTES
covid swab obtained, labeled and sent to lab. Pt moving all extremities at this time. Opening eyes. Will medicate patient accordingly. Kate Ross RN also at bedside.       Jovanni Espinoza RN  06/18/21 1493

## 2021-06-18 NOTE — ED NOTES
Bed: 24  Expected date:   Expected time:   Means of arrival:   Comments:   .[facility] [place], [place]  06/18/21 4935

## 2021-06-19 ENCOUNTER — APPOINTMENT (OUTPATIENT)
Dept: GENERAL RADIOLOGY | Age: 56
DRG: 100 | End: 2021-06-19
Payer: COMMERCIAL

## 2021-06-19 LAB
ABSOLUTE EOS #: 0 K/UL (ref 0–0.4)
ABSOLUTE IMMATURE GRANULOCYTE: 0 K/UL (ref 0–0.3)
ABSOLUTE LYMPH #: 1.78 K/UL (ref 1–4.8)
ABSOLUTE MONO #: 1.78 K/UL (ref 0.1–0.8)
ABSOLUTE RETIC #: 0.01 M/UL (ref 0.03–0.08)
ALBUMIN SERPL-MCNC: 3.1 G/DL (ref 3.5–5.2)
ALBUMIN/GLOBULIN RATIO: 1.2 (ref 1–2.5)
ALLEN TEST: POSITIVE
ALP BLD-CCNC: 55 U/L (ref 35–104)
ALT SERPL-CCNC: 5 U/L (ref 5–33)
ANION GAP SERPL CALCULATED.3IONS-SCNC: 9 MMOL/L (ref 9–17)
AST SERPL-CCNC: 19 U/L
BASOPHILS # BLD: 0 % (ref 0–2)
BASOPHILS ABSOLUTE: 0 K/UL (ref 0–0.2)
BILIRUB SERPL-MCNC: 0.16 MG/DL (ref 0.3–1.2)
BUN BLDV-MCNC: 4 MG/DL (ref 6–20)
BUN/CREAT BLD: ABNORMAL (ref 9–20)
CALCIUM IONIZED: 1.07 MMOL/L (ref 1.13–1.33)
CALCIUM SERPL-MCNC: 8.7 MG/DL (ref 8.6–10.4)
CHLORIDE BLD-SCNC: 102 MMOL/L (ref 98–107)
CO2: 26 MMOL/L (ref 20–31)
CREAT SERPL-MCNC: 0.23 MG/DL (ref 0.5–0.9)
DIFFERENTIAL TYPE: ABNORMAL
EKG ATRIAL RATE: 129 BPM
EKG ATRIAL RATE: 98 BPM
EKG P AXIS: 60 DEGREES
EKG P AXIS: 74 DEGREES
EKG P-R INTERVAL: 148 MS
EKG P-R INTERVAL: 166 MS
EKG Q-T INTERVAL: 316 MS
EKG Q-T INTERVAL: 362 MS
EKG QRS DURATION: 82 MS
EKG QRS DURATION: 88 MS
EKG QTC CALCULATION (BAZETT): 462 MS
EKG QTC CALCULATION (BAZETT): 462 MS
EKG R AXIS: 26 DEGREES
EKG R AXIS: 39 DEGREES
EKG T AXIS: 74 DEGREES
EKG T AXIS: 92 DEGREES
EKG VENTRICULAR RATE: 129 BPM
EKG VENTRICULAR RATE: 98 BPM
EOSINOPHILS RELATIVE PERCENT: 0 % (ref 1–4)
FERRITIN: 13 UG/L (ref 13–150)
FIO2: 30
GFR AFRICAN AMERICAN: >60 ML/MIN
GFR NON-AFRICAN AMERICAN: >60 ML/MIN
GFR SERPL CREATININE-BSD FRML MDRD: ABNORMAL ML/MIN/{1.73_M2}
GFR SERPL CREATININE-BSD FRML MDRD: ABNORMAL ML/MIN/{1.73_M2}
GLUCOSE BLD-MCNC: 102 MG/DL (ref 70–99)
HCT VFR BLD CALC: 28.6 % (ref 36.3–47.1)
HEMOGLOBIN: 7.7 G/DL (ref 11.9–15.1)
IMMATURE GRANULOCYTES: 0 %
IMMATURE RETIC FRACT: 7.6 % (ref 2.7–18.3)
LACTIC ACID, WHOLE BLOOD: 1 MMOL/L (ref 0.7–2.1)
LACTIC ACID, WHOLE BLOOD: 1 MMOL/L (ref 0.7–2.1)
LACTIC ACID, WHOLE BLOOD: 1.1 MMOL/L (ref 0.7–2.1)
LACTIC ACID, WHOLE BLOOD: 1.4 MMOL/L (ref 0.7–2.1)
LACTIC ACID: NORMAL MMOL/L
LYMPHOCYTES # BLD: 9 % (ref 24–44)
MAGNESIUM: 1.6 MG/DL (ref 1.6–2.6)
MCH RBC QN AUTO: 21.6 PG (ref 25.2–33.5)
MCHC RBC AUTO-ENTMCNC: 26.9 G/DL (ref 28.4–34.8)
MCV RBC AUTO: 80.3 FL (ref 82.6–102.9)
MODE: NORMAL
MONOCYTES # BLD: 9 % (ref 1–7)
MORPHOLOGY: ABNORMAL
NEGATIVE BASE EXCESS, ART: NORMAL (ref 0–2)
NRBC AUTOMATED: 0 PER 100 WBC
O2 DEVICE/FLOW/%: NORMAL
PATIENT TEMP: NORMAL
PDW BLD-RTO: 27.7 % (ref 11.8–14.4)
PHOSPHORUS: 3.8 MG/DL (ref 2.6–4.5)
PLATELET # BLD: ABNORMAL K/UL (ref 138–453)
PLATELET ESTIMATE: ABNORMAL
PLATELET, FLUORESCENCE: 1108 K/UL (ref 138–453)
PLATELET, IMMATURE FRACTION: 1.7 % (ref 1.1–10.3)
PMV BLD AUTO: ABNORMAL FL (ref 8.1–13.5)
POC HCO3: 28 MMOL/L (ref 21–28)
POC O2 SATURATION: 98 % (ref 94–98)
POC PCO2 TEMP: NORMAL MM HG
POC PCO2: 42.5 MM HG (ref 35–48)
POC PH TEMP: NORMAL
POC PH: 7.43 (ref 7.35–7.45)
POC PO2 TEMP: NORMAL MM HG
POC PO2: 98.7 MM HG (ref 83–108)
POSITIVE BASE EXCESS, ART: 3 (ref 0–3)
POTASSIUM SERPL-SCNC: 3.9 MMOL/L (ref 3.7–5.3)
PROCALCITONIN: 0.08 NG/ML
RBC # BLD: 3.56 M/UL (ref 3.95–5.11)
RBC # BLD: ABNORMAL 10*6/UL
RETIC %: 0.3 % (ref 0.5–1.9)
RETIC HEMOGLOBIN: 16.3 PG (ref 28.2–35.7)
SAMPLE SITE: NORMAL
SEG NEUTROPHILS: 82 % (ref 36–66)
SEGMENTED NEUTROPHILS ABSOLUTE COUNT: 16.24 K/UL (ref 1.8–7.7)
SODIUM BLD-SCNC: 137 MMOL/L (ref 135–144)
TCO2 (CALC), ART: NORMAL MMOL/L (ref 22–29)
TOTAL PROTEIN: 5.6 G/DL (ref 6.4–8.3)
TROPONIN INTERP: ABNORMAL
TROPONIN T: ABNORMAL NG/ML
TROPONIN, HIGH SENSITIVITY: 111 NG/L (ref 0–14)
TROPONIN, HIGH SENSITIVITY: 115 NG/L (ref 0–14)
TROPONIN, HIGH SENSITIVITY: 132 NG/L (ref 0–14)
WBC # BLD: 19.8 K/UL (ref 3.5–11.3)
WBC # BLD: ABNORMAL 10*3/UL

## 2021-06-19 PROCEDURE — 6370000000 HC RX 637 (ALT 250 FOR IP): Performed by: STUDENT IN AN ORGANIZED HEALTH CARE EDUCATION/TRAINING PROGRAM

## 2021-06-19 PROCEDURE — 95711 VEEG 2-12 HR UNMONITORED: CPT

## 2021-06-19 PROCEDURE — 85045 AUTOMATED RETICULOCYTE COUNT: CPT

## 2021-06-19 PROCEDURE — 94761 N-INVAS EAR/PLS OXIMETRY MLT: CPT

## 2021-06-19 PROCEDURE — 2500000003 HC RX 250 WO HCPCS: Performed by: EMERGENCY MEDICINE

## 2021-06-19 PROCEDURE — 83605 ASSAY OF LACTIC ACID: CPT

## 2021-06-19 PROCEDURE — 99232 SBSQ HOSP IP/OBS MODERATE 35: CPT | Performed by: INTERNAL MEDICINE

## 2021-06-19 PROCEDURE — 36415 COLL VENOUS BLD VENIPUNCTURE: CPT

## 2021-06-19 PROCEDURE — 2000000003 HC NEURO ICU R&B

## 2021-06-19 PROCEDURE — 6360000002 HC RX W HCPCS: Performed by: STUDENT IN AN ORGANIZED HEALTH CARE EDUCATION/TRAINING PROGRAM

## 2021-06-19 PROCEDURE — 82728 ASSAY OF FERRITIN: CPT

## 2021-06-19 PROCEDURE — 85055 RETICULATED PLATELET ASSAY: CPT

## 2021-06-19 PROCEDURE — 82803 BLOOD GASES ANY COMBINATION: CPT

## 2021-06-19 PROCEDURE — 94003 VENT MGMT INPAT SUBQ DAY: CPT

## 2021-06-19 PROCEDURE — 93010 ELECTROCARDIOGRAM REPORT: CPT | Performed by: INTERNAL MEDICINE

## 2021-06-19 PROCEDURE — 36600 WITHDRAWAL OF ARTERIAL BLOOD: CPT

## 2021-06-19 PROCEDURE — 6370000000 HC RX 637 (ALT 250 FOR IP): Performed by: EMERGENCY MEDICINE

## 2021-06-19 PROCEDURE — 84484 ASSAY OF TROPONIN QUANT: CPT

## 2021-06-19 PROCEDURE — 71045 X-RAY EXAM CHEST 1 VIEW: CPT

## 2021-06-19 PROCEDURE — 84145 PROCALCITONIN (PCT): CPT

## 2021-06-19 PROCEDURE — 6360000002 HC RX W HCPCS

## 2021-06-19 PROCEDURE — 82330 ASSAY OF CALCIUM: CPT

## 2021-06-19 PROCEDURE — 2700000000 HC OXYGEN THERAPY PER DAY

## 2021-06-19 PROCEDURE — 83735 ASSAY OF MAGNESIUM: CPT

## 2021-06-19 PROCEDURE — 95720 EEG PHY/QHP EA INCR W/VEEG: CPT | Performed by: PSYCHIATRY & NEUROLOGY

## 2021-06-19 PROCEDURE — 80053 COMPREHEN METABOLIC PANEL: CPT

## 2021-06-19 PROCEDURE — 85025 COMPLETE CBC W/AUTO DIFF WBC: CPT

## 2021-06-19 PROCEDURE — 2580000003 HC RX 258: Performed by: STUDENT IN AN ORGANIZED HEALTH CARE EDUCATION/TRAINING PROGRAM

## 2021-06-19 PROCEDURE — 99291 CRITICAL CARE FIRST HOUR: CPT | Performed by: PSYCHIATRY & NEUROLOGY

## 2021-06-19 PROCEDURE — 84100 ASSAY OF PHOSPHORUS: CPT

## 2021-06-19 RX ORDER — LEVETIRACETAM 500 MG/1
1000 TABLET ORAL 2 TIMES DAILY
Status: DISCONTINUED | OUTPATIENT
Start: 2021-06-19 | End: 2021-06-23

## 2021-06-19 RX ORDER — OXYCODONE HYDROCHLORIDE AND ACETAMINOPHEN 5; 325 MG/1; MG/1
1 TABLET ORAL EVERY 4 HOURS PRN
Status: DISCONTINUED | OUTPATIENT
Start: 2021-06-19 | End: 2021-06-24 | Stop reason: HOSPADM

## 2021-06-19 RX ORDER — PROPOFOL 10 MG/ML
INJECTION, EMULSION INTRAVENOUS
Status: COMPLETED
Start: 2021-06-19 | End: 2021-06-19

## 2021-06-19 RX ORDER — CALCIUM GLUCONATE 20 MG/ML
2000 INJECTION, SOLUTION INTRAVENOUS ONCE
Status: COMPLETED | OUTPATIENT
Start: 2021-06-19 | End: 2021-06-19

## 2021-06-19 RX ORDER — MORPHINE SULFATE 15 MG/1
15 TABLET, FILM COATED, EXTENDED RELEASE ORAL EVERY 12 HOURS SCHEDULED
Status: DISCONTINUED | OUTPATIENT
Start: 2021-06-19 | End: 2021-06-24 | Stop reason: HOSPADM

## 2021-06-19 RX ORDER — MAGNESIUM SULFATE IN WATER 40 MG/ML
2000 INJECTION, SOLUTION INTRAVENOUS ONCE
Status: COMPLETED | OUTPATIENT
Start: 2021-06-19 | End: 2021-06-19

## 2021-06-19 RX ADMIN — PROPOFOL 65 MCG/KG/MIN: 10 INJECTION, EMULSION INTRAVENOUS at 03:34

## 2021-06-19 RX ADMIN — MORPHINE SULFATE 15 MG: 15 TABLET, EXTENDED RELEASE ORAL at 20:48

## 2021-06-19 RX ADMIN — MORPHINE SULFATE 15 MG: 15 TABLET, EXTENDED RELEASE ORAL at 12:24

## 2021-06-19 RX ADMIN — OXYCODONE HYDROCHLORIDE AND ACETAMINOPHEN 1 TABLET: 5; 325 TABLET ORAL at 20:07

## 2021-06-19 RX ADMIN — POTASSIUM BICARBONATE 40 MEQ: 782 TABLET, EFFERVESCENT ORAL at 01:03

## 2021-06-19 RX ADMIN — FAMOTIDINE 20 MG: 10 INJECTION INTRAVENOUS at 09:11

## 2021-06-19 RX ADMIN — CHLORHEXIDINE GLUCONATE 15 ML: 1.2 RINSE ORAL at 09:11

## 2021-06-19 RX ADMIN — FENTANYL CITRATE 100 MCG: 50 INJECTION, SOLUTION INTRAMUSCULAR; INTRAVENOUS at 02:29

## 2021-06-19 RX ADMIN — MAGNESIUM SULFATE 2000 MG: 2 INJECTION INTRAVENOUS at 07:03

## 2021-06-19 RX ADMIN — LEVETIRACETAM 500 MG: 5 INJECTION INTRAVENOUS at 08:35

## 2021-06-19 RX ADMIN — IRON SUCROSE 300 MG: 20 INJECTION, SOLUTION INTRAVENOUS at 16:14

## 2021-06-19 RX ADMIN — APIXABAN 5 MG: 5 TABLET, FILM COATED ORAL at 13:53

## 2021-06-19 RX ADMIN — SODIUM CHLORIDE, PRESERVATIVE FREE 10 ML: 5 INJECTION INTRAVENOUS at 20:48

## 2021-06-19 RX ADMIN — FENTANYL CITRATE 100 MCG: 50 INJECTION, SOLUTION INTRAMUSCULAR; INTRAVENOUS at 05:51

## 2021-06-19 RX ADMIN — OXYCODONE HYDROCHLORIDE AND ACETAMINOPHEN 1 TABLET: 5; 325 TABLET ORAL at 14:23

## 2021-06-19 RX ADMIN — PROPOFOL 50 MCG/KG/MIN: 10 INJECTION, EMULSION INTRAVENOUS at 07:31

## 2021-06-19 RX ADMIN — PROPOFOL 75 MCG/KG/MIN: 10 INJECTION, EMULSION INTRAVENOUS at 01:02

## 2021-06-19 RX ADMIN — LEVETIRACETAM 1000 MG: 500 TABLET, FILM COATED ORAL at 20:48

## 2021-06-19 RX ADMIN — CALCIUM GLUCONATE 2000 MG: 20 INJECTION, SOLUTION INTRAVENOUS at 02:25

## 2021-06-19 RX ADMIN — FENTANYL CITRATE 100 MCG: 50 INJECTION, SOLUTION INTRAMUSCULAR; INTRAVENOUS at 03:59

## 2021-06-19 RX ADMIN — ENOXAPARIN SODIUM 40 MG: 40 INJECTION SUBCUTANEOUS at 09:11

## 2021-06-19 RX ADMIN — SERTRALINE 100 MG: 50 TABLET, FILM COATED ORAL at 12:24

## 2021-06-19 ASSESSMENT — PAIN SCALES - GENERAL
PAINLEVEL_OUTOF10: 6
PAINLEVEL_OUTOF10: 7
PAINLEVEL_OUTOF10: 5
PAINLEVEL_OUTOF10: 6
PAINLEVEL_OUTOF10: 7
PAINLEVEL_OUTOF10: 7

## 2021-06-19 ASSESSMENT — PULMONARY FUNCTION TESTS
PIF_VALUE: 28
PIF_VALUE: 11
PIF_VALUE: 11

## 2021-06-19 NOTE — PROGRESS NOTES
Physician Progress Note      Jame Cortez  CSN #:                  [id number]  :                       1965  ADMIT DATE:       2021 9:17 AM  DISCH DATE:  RESPONDING  PROVIDER #:        Ray Sotelo MD          QUERY TEXT:    Pt admitted with seizures  and  noted per ED documentation of episodes of   apnea. and was subsequently intubated . If possible, please document in   progress notes and discharge summary further specificity regarding the type   and acuity of respiratory failure: The medical record reflects the following:  Risk Factors: status epilepticus,  Clinical Indicators: per ED attending documentation of seizures requiring   multiple doses of IV ativan and observed to several apnea episodes and   intubated for impending respiratory failure  Treatment: mechanical ventilation    Please Call if any questions Thank you  Milton Neff RN BSN CCDS  Options provided:  -- Intubated for Acute respiratory failure with hypoxia  -- intubated for airway protection only  -- intubated for acute respiratory failure and airway protection  -- Other - I will add my own diagnosis  -- Disagree - Not applicable / Not valid  -- Disagree - Clinically unable to determine / Unknown  -- Refer to Clinical Documentation Reviewer    PROVIDER RESPONSE TEXT:    This patient was intubated for both acute respiratory failure and airway   protection    Query created by: Erik Clark on 2021 12:47 PM      QUERY TEXT:    Pt admitted with seizure. Pt noted to have documentation of history of   seizures due to PRES and also noted brain mets  . If possible, please document   in progress notes and discharge summary if you are evaluating and/or treating   any of the following:     The medical record reflects the following:  Risk Factors: metastatic ovarian cancer , history of htn and PRES  Clinical Indicators: per H&P noted status epilepticus with history of brain   cancer and seizures d/t PRES Treatment: monitoring, IV ativan, AED medication    Please Call if any questions Thank you  Florina Garrett RN BSN CCDS  Options provided:  -- Seizure due to brain neoplasm  -- Seizure due to PRES  -- Seizure due to medication noncompliance with history of epilepsy  -- Other - I will add my own diagnosis  -- Disagree - Not applicable / Not valid  -- Disagree - Clinically unable to determine / Unknown  -- Refer to Clinical Documentation Reviewer    PROVIDER RESPONSE TEXT:    Provider is clinically unable to determine a response to this query. Query created by:  Elbert Luna on 6/19/2021 12:50 PM      Electronically signed by:  Leona Weir MD 6/19/2021 2:34 PM

## 2021-06-19 NOTE — PLAN OF CARE
Problem: OXYGENATION/RESPIRATORY FUNCTION  Goal: Patient will maintain patent airway  6/19/2021 0839 by Tony Guallpa RCP  Outcome: Ongoing     Problem: OXYGENATION/RESPIRATORY FUNCTION  Goal: Patient will achieve/maintain normal respiratory rate/effort  Description: Respiratory rate and effort will be within normal limits for the patient  6/19/2021 0839 by Tony Guallpa RCP  Outcome: Ongoing     Problem: MECHANICAL VENTILATION  Goal: Patient will maintain patent airway  6/19/2021 0839 by Toyn Guallpa RCP  Outcome: Ongoing     Problem: MECHANICAL VENTILATION  Goal: Oral health is maintained or improved  6/19/2021 0839 by Tony Guallpa RCP  Outcome: Ongoing     Problem: MECHANICAL VENTILATION  Goal: ET tube will be managed safely  6/19/2021 0839 by Tony Guallpa RCP  Outcome: Ongoing     Problem: MECHANICAL VENTILATION  Goal: Ability to express needs and understand communication  6/19/2021 0839 by Tony Guallpa RCP  Outcome: Ongoing     Problem: MECHANICAL VENTILATION  Goal: Mobility/activity is maintained at optimum level for patient  6/19/2021 0839 by Tony Guallpa RCP  Outcome: Ongoing     Problem: SKIN INTEGRITY  Goal: Skin integrity is maintained or improved  6/19/2021 0839 by Tony Guallpa RCP  Outcome: Ongoing

## 2021-06-19 NOTE — PROCEDURES
LONG-TERM EEG-VIDEO [id number]    Patient: Sandy Phillips [facility]  Age: 60 y.o. MRN: [id number]    Referring Physician: No ref. provider found  History: The patient is a 60 y.o. female who presented breakthrough seizure/encephalopathy. This long-term video-EEG monitoring study was performed to determine the nature of the patient's clinical events. The patient is on neuroactive medications.    Sandy Phillips [place]   Current Facility-Administered Medications   Medication Dose Route Frequency Provider Last Rate Last Admin    magnesium sulfate 2000 mg in 50 mL IVPB premix  2,000 mg Intravenous Once Kaylee Body, DO 25 mL/hr at 06/19/21 0703 2,000 mg at 06/19/21 0703    chlorhexidine (PERIDEX) 0.12 % solution 15 mL  15 mL Mouth/Throat BID Ramakrishna Mccartney MD        famotidine (PEPCID) injection 20 mg  20 mg Intravenous BID Evelin Lopez MD   20 mg at 06/18/21 2127    propofol injection  5-50 mcg/kg/min Intravenous Titrated Malorie Corbett MD 22.5 mL/hr at 06/19/21 0600 50 mcg/kg/min at 06/19/21 0600    sodium chloride flush 0.9 % injection 5-40 mL  5-40 mL Intravenous 2 times per day Malorie Corbett MD   10 mL at 06/18/21 2131    sodium chloride flush 0.9 % injection 5-40 mL  5-40 mL Intravenous PRN Malorie Corbett MD        0.9 % sodium chloride infusion  25 mL Intravenous PRN Malorie Corbett MD        acetaminophen (TYLENOL) tablet 650 mg  650 mg Oral Q4H PRN Malorie Corbett MD        sodium chloride flush 0.9 % injection 5-40 mL  5-40 mL Intravenous 2 times per day Kaylee Body, DO        sodium chloride flush 0.9 % injection 5-40 mL  5-40 mL Intravenous PRN Kaylee Body, DO        0.9 % sodium chloride infusion  25 mL Intravenous PRN Kaylee Body, DO        enoxaparin (LOVENOX) injection 40 mg  40 mg Subcutaneous Daily Nick Frazier, DO   40 mg at 06/18/21 1252    ondansetron (ZOFRAN-ODT) disintegrating tablet 4 mg  4 mg Oral Q8H PRN Nathan Greening, DO        Or    ondansetron (ZOFRAN) injection 4 mg  4 mg Intravenous Q6H PRN Nathan Greening, DO   4 mg at 06/18/21 1414    polyethylene glycol (GLYCOLAX) packet 17 g  17 g Oral Daily PRN Nathan Greening, DO        acetaminophen (TYLENOL) tablet 650 mg  650 mg Oral Q6H PRN Nathan Greening, DO        Or    acetaminophen (TYLENOL) suppository 650 mg  650 mg Rectal Q6H PRN Nathan Greening, DO        lactated ringers infusion   Intravenous Continuous Nathan Greening,  mL/hr at 06/18/21 1252 New Bag at 06/18/21 1252    fentaNYL (SUBLIMAZE) injection 100 mcg  100 mcg Intravenous Q1H PRN Payal Mckeon MD   100 mcg at 06/19/21 0551    levETIRAcetam (KEPPRA) 1000 mg/100 mL IVPB  1,000 mg Intravenous Once Nathan Greening, DO   Held at 06/18/21 1422    levETIRAcetam (KEPPRA) 500 mg/100 mL IVPB  500 mg Intravenous Q12H Nathan Greening, DO        aspirin chewable tablet 324 mg  324 mg Oral Once Nick Frazier DO        midazolam (VERSED) 1 mg/mL in D5W infusion  1-10 mg/hr Intravenous Continuous Nathan Greening, DO 5 mL/hr at 06/19/21 0600 5 mg/hr at 06/19/21 0600     Technical Description: This is a 21-channel digital EEG recording with time-locked video. Electrodes were placed in accordance with the 10-20 International System of Electrode Placement. Single lead EKG monitoring was included. Baseline EEG Recording:  A formal baseline EEG recording was not obtained. Day 1 - 6/18/21, starting at 1653    Interictal EEG Samples: The background activity consisted of 6-7 Hz of polymorphic theta frequency of 20 to 20 µV. There was poor anterior posterior amplitude gradient. There was no asymmetry between the 2 hemispheres. Diffuse beta activity was seen on all the leads. During behavioral sleep, sleep spindles were seen over both hemispheres. Occasional periods of diffuse attenuation were seen.   The EKG channel revealed no

## 2021-06-19 NOTE — PROGRESS NOTES
TODAY:  6/19/2021       AWAKE & FOLLOWING COMMANDS:                   [] No               [x] Yes     INTUBATED:              [] No               [x] Yes     SEDATION/ANALGESIA:        [x] Propofol gtt             [] Versed gtt               [] Ativan gtt                [] No Sedation    Pain medications: apap, fentanyl prn     FEEDING: Able to take PO?  [] No:  [x] NPO for:     swallow eval            DVT Prophylaxis:  [x] Yes:    [] No rationale:      Stress Ulcer Prophylaxis: [x] Yes:   pepcid         [] Not indicated     VASOPRESSORS:    [x] No                           [] Yes     CENTRAL/ARTERIAL LINES:  [x] No               BROWNLEE CATHETER: [] No  yes placed 6/18                  DRAINS: [] No          [] Yes:     Head of Bed: [x] Elevated:          [] Flat     Glucose management: [x] Not indicated, consistently less than 180

## 2021-06-19 NOTE — PROGRESS NOTES
Order obtained for extubation. SpO2 of 100 on 30% FiO2. Patient extubated and placed on 4 liters/min via nasal cannula. Post extubation SpO2 is 100% with HR  104 bpm and RR 16 breaths/min. Patient had moderate cough that was non-productive. Extubation Well tolerated by patient. .   Breath Sounds: clear and diminished    KRYSTIN CHURCH RCP   12:07 PM

## 2021-06-19 NOTE — PLAN OF CARE
Problem: OXYGENATION/RESPIRATORY FUNCTION  Goal: Patient will maintain patent airway  6/19/2021 1133 by Mir Nassar RN  Outcome: Ongoing  6/19/2021 0839 by Layton Rubinstein, RCP  Outcome: Ongoing  6/19/2021 0117 by Deepak Dupree RN  Outcome: Ongoing  Goal: Patient will achieve/maintain normal respiratory rate/effort  Description: Respiratory rate and effort will be within normal limits for the patient  6/19/2021 1133 by Mir Nassar RN  Outcome: Ongoing  6/19/2021 0839 by Layton Rubinstein, RCP  Outcome: Ongoing  6/19/2021 0117 by Deepak Dupree RN  Outcome: Ongoing     Problem: MECHANICAL VENTILATION  Goal: Patient will maintain patent airway  6/19/2021 1133 by Mir Nassar RN  Outcome: Ongoing  6/19/2021 0839 by Layton Rubinstein, RCP  Outcome: Ongoing  6/19/2021 0117 by Deepak Dupree RN  Outcome: Ongoing  Goal: Oral health is maintained or improved  6/19/2021 1133 by Mir Nassar RN  Outcome: Ongoing  6/19/2021 0839 by Layton Rubinstein, RCP  Outcome: Ongoing  6/19/2021 0117 by Deepak Dupree RN  Outcome: Ongoing  Goal: ET tube will be managed safely  6/19/2021 1133 by Mir Nassar RN  Outcome: Ongoing  6/19/2021 0839 by Layton Rubinstein, RCP  Outcome: Ongoing  6/19/2021 0117 by Deepak Dupree RN  Outcome: Ongoing  Goal: Ability to express needs and understand communication  6/19/2021 1133 by Mir Nassar RN  Outcome: Ongoing  6/19/2021 0839 by Layton Rubinstein, RCP  Outcome: Ongoing  6/19/2021 0117 by Deepak Dupree RN  Outcome: Ongoing  Goal: Mobility/activity is maintained at optimum level for patient  6/19/2021 1133 by Mir Nassar RN  Outcome: Ongoing  6/19/2021 0839 by Layton Rubinstein, RCP  Outcome: Ongoing  6/19/2021 0117 by Deepak Dupree RN  Outcome: Ongoing     Problem: SKIN INTEGRITY  Goal: Skin integrity is maintained or improved  6/19/2021 1133 by Mir Nassar RN  Outcome: Ongoing  6/19/2021 0839 by Layton Rubinstein, RCP  Outcome: Ongoing  6/19/2021 0117 by Deepak Dupree RN Outcome: Ongoing     Problem: Skin Integrity:  Goal: Will show no infection signs and symptoms  Description: Will show no infection signs and symptoms  6/19/2021 1133 by Vita Velasquez RN  Outcome: Ongoing  6/19/2021 0117 by Joel Garibay RN  Outcome: Ongoing  Goal: Absence of new skin breakdown  Description: Absence of new skin breakdown  6/19/2021 1133 by Vita Velasquez RN  Outcome: Ongoing  6/19/2021 0117 by Joel Garibay RN  Outcome: Ongoing     Problem: Falls - Risk of:  Goal: Will remain free from falls  Description: Will remain free from falls  6/19/2021 1133 by Vita Velasquez RN  Outcome: Ongoing  6/19/2021 0117 by Joel Garibay RN  Outcome: Ongoing  Goal: Absence of physical injury  Description: Absence of physical injury  6/19/2021 1133 by Vita Velasquez RN  Outcome: Ongoing  6/19/2021 0117 by Joel Garibay RN  Outcome: Ongoing     Problem: Non-Violent Restraints  Goal: Removal from restraints as soon as assessed to be safe  6/19/2021 1133 by Vita Velasquez RN  Outcome: Not Met This Shift  6/19/2021 0117 by Joel Garibay RN  Outcome: Ongoing  Goal: No harm/injury to patient while restraints in use  6/19/2021 1133 by Vita Velasquez RN  Outcome: Met This Shift  6/19/2021 0117 by Joel Garibay RN  Outcome: Ongoing  Goal: Patient's dignity will be maintained  6/19/2021 1133 by Vita Velasquez RN  Outcome: Ongoing  6/19/2021 0117 by Joel Garibay RN  Outcome: Ongoing

## 2021-06-19 NOTE — PROGRESS NOTES
Daily Progress Note  Neuro Critical Care    Patient Name: Regan Cornelius [facility]  Patient : 1965  Room/Bed: 0530/0530-01  Code Status: full  Allergies: Allergies   Allergen Reactions    Ceftriaxone      Had a rash on ceftriaxone 2020, Chele       CHIEF COMPLAINT:     Status epilepticus     INTERVAL HISTORY    Johnna Morin a 60 y. o. female who presents with RACE alert.   last known well 8:35 AM per report of son at bedside. History provided by son at bedside patient was talking to the son who went to check on his mom at home because he \"felt something was off\". Per son May Renee has unable to respond to his qeustions. Son denies seizure-like activity, but patient does have history of seizures, is on Keppra and has been taking as prescribed. Zena Edmondson has history of metastatic ovarian cancer. Was recently admitted to the neurology service for PRES syndrome.  In route, EMS reports the patient had an episode ofseizure-like activity, administered 2 mg Versed with resolution of seizure-like activity. Patient has prescribed Eliquis for prophylaxis secondary to her high risk of venous thromboembolism secondary to known cancer. Report from son is that she has been compliant. In the emergency department patient had 2 episodes of generalized tonic-clonic like seizures each of which were aborted with 2 mg of Ativan patient was loaded with 1 g of Keppra she subsequently underwent an MRI after negative CT head and CTA head and neck the MRI was read as negative for any acute infarct.     Last 24h:    No acute events overnight.  100.4 temp this am     CURRENT MEDICATIONS:  SCHEDULED MEDICATIONS:   chlorhexidine  15 mL Mouth/Throat BID    famotidine (PEPCID) injection  20 mg Intravenous BID    sodium chloride flush  5-40 mL Intravenous 2 times per day    sodium chloride flush  5-40 mL Intravenous 2 times per day    enoxaparin  40 mg Subcutaneous Daily    levetiracetam  1,000 mg Intravenous Once    levetiracetam  500 mg Intravenous Q12H    aspirin  324 mg Oral Once     CONTINUOUS INFUSIONS:   propofol 60 mcg/kg/min (21 0400)    sodium chloride      sodium chloride      lactated ringers 100 mL/hr at 21 1252    midazolam 5 mg/hr (21 040)     PRN MEDICATIONS:   sodium chloride flush, sodium chloride, acetaminophen, sodium chloride flush, sodium chloride, ondansetron **OR** ondansetron, polyethylene glycol, acetaminophen **OR** acetaminophen, fentanNYL    VITALS:  Temperature Range: Temp: 99.5 °F (37.5 °C) Temp  Av.1 °F (37.3 °C)  Min: 98.8 °F (37.1 °C)  Max: 99.5 °F (37.5 °C)  BP Range: Systolic (62QHO), TRK:391 , Min:110 , NBO:038     Diastolic (95IHX), KKH:06, Min:59, Max:103    Pulse Range: Pulse  Av.6  Min: 87  Max: 133  Respiration Range: Resp  Av.7  Min: 15  Max: 31  Current Pulse Ox: SpO2: 99 %  24HR Pulse Ox Range: SpO2  Av.9 %  Min: 99 %  Max: 100 %  Patient Vitals for the past 12 hrs:   BP Temp Temp src Pulse Resp SpO2 Weight   21 0400 (!) 148/61 99.5 °F (37.5 °C) Oral 102 26 99 %    21 0345 (!) 157/73   101 25 99 %    21 0325    98 25 99 %    21 0300 (!) 145/65   95 20 99 %    21 0200 (!) 158/70 99.1 °F (37.3 °C) Oral 97 24 99 %    21 0130    96 24 100 %    21 0100 (!) 161/71   95 22 100 %    21 0000 (!) 153/72   94 22 100 %    21 2345 (!) 152/69   95 24 99 %    21 2330 (!) 154/67 98.8 °F (37.1 °C) Oral 98 23 100 % 163 lb (73.9 kg)   219    100 26 100 %    21 (!) 157/76   100 25 100 %    21 195 (!) 157/81   106 28 100 %    21 194 (!) 157/81   103 26 100 %    21 1935    104 26 100 %    21 190 (!) 158/80   104 26 100 %    21 1846 (!) 157/81   104 27 100 %    21 183 (!) 161/81   108 28 100 %    21 181 (!) 157/79   104 25 100 %      Estimated body mass index is 29.81 kg/m² as calculated from the following:    Height as of this encounter: 5' 2\" (1.575 m). Weight as of this encounter: 163 lb (73.9 kg).  []<16 Severe malnutrition  []1616.99 Moderate malnutrition  []1718.49 Mild malnutrition  []18.524.9 Normal  [x]2529.9 Overweight (not obese)  []3034.9 Obese class 1 (Low Risk)  []3539.9 Obese class 2 (Moderate Risk)  []?40 Obese class 3 (High Risk)    RECENT LABS:   Lab Results   Component Value Date    WBC 19.8 (H) 06/19/2021    HGB 7.7 (L) 06/19/2021    HCT 28.6 (L) 06/19/2021    PLT See Reflexed IPF Result 06/19/2021    CHOL 131 03/10/2021    TRIG 147 03/10/2021    HDL 33 (L) 03/10/2021    ALT 5 06/18/2021    AST 12 06/18/2021     06/18/2021    K 3.4 (L) 06/18/2021     06/18/2021    CREATININE 0.24 (L) 06/18/2021    BUN 6 06/18/2021    CO2 22 06/18/2021    TSH 2.93 03/09/2021    INR 0.9 06/18/2021    LABA1C 6.0 03/10/2021     24 HOUR INTAKE/OUTPUT:    Intake/Output Summary (Last 24 hours) at 6/19/2021 0546  Last data filed at 6/19/2021 0400  Gross per 24 hour   Intake    Output 950 ml   Net -950 ml     CT Head WO Contrast    Result Date: 6/18/2021  EXAMINATION: CT OF THE HEAD WITHOUT CONTRAST  6/18/2021 9:19 am TECHNIQUE: CT of the head was performed without the administration of intravenous contrast. Dose modulation, iterative reconstruction, and/or weight based adjustment of the mA/kV was utilized to reduce the radiation dose to as low as reasonably achievable. COMPARISON: 03/09/2021 HISTORY: ORDERING SYSTEM PROVIDED HISTORY: stroke TECHNOLOGIST PROVIDED HISTORY: stroke Decision Support Exception - unselect if not a suspected or confirmed emergency medical condition->Emergency Medical Condition (MA) FINDINGS: BRAIN/VENTRICLES: There is no acute infarct or acute intracranial hemorrhage present. There is no mass effect or midline shift present. There is no ventriculomegaly or abnormal extra-axial fluid collection present.  ORBITS: Limited evaluation of the orbits is unremarkable. SINUSES: The paranasal sinuses and mastoid air cells are clear. SOFT TISSUES/SKULL:  No lytic or blastic osseous lesions are identified. No acute intracranial process identified. The findings were sent to the Radiology Results [id number] at 9:41 am on 6/18/2021to be communicated to the Stroke Neurology service. XR CHEST PORTABLE    Result Date: 6/18/2021  EXAMINATION: ONE XRAY VIEW OF THE CHEST 6/18/2021 10:22 am COMPARISON: AP chest from 03/09/2021; CT chest from 03/09/2021 HISTORY: ORDERING SYSTEM PROVIDED HISTORY: post intubation TECHNOLOGIST PROVIDED HISTORY: post intubation History of ovarian cancer and osseous metastases with previous splenic embolization. FINDINGS: ETT tip position satisfactory approximately 4 cm above the alejandro. Enteric tube tip and side hole project below left hemidiaphragm. Right IJ port in unchanged position, with good tip position in the upper right atrium. Coils splenic artery LUQ. Enlarged but stable appearing cardiac silhouette. Mediastinal structures appropriate for slight rotation to the left. Low lung volumes; mildly increased interstitial markings with scattered parenchymal densities and unchanged increased hilar shadows, L>R. Minimal unchanged blunting left costophrenic angle. No new pulmonary or right pleural abnormality. Scattered bony sclerotic foci, compatible with known Mets. No destructive lesion or obvious interval change. Support tubes satisfactory. Similar enlarged susan (known adenopathy), scattered parenchymal densities (known pulmonary metastases) without significant interval change; probable smaller left pleural effusion. CTA HEAD NECK W CONTRAST    Result Date: 6/18/2021  EXAMINATION: CTA OF THE HEAD AND NECK WITH CONTRAST 6/18/2021 9:19 am: TECHNIQUE: CTA of the head and neck was performed with the administration of intravenous contrast. Multiplanar reformatted images are provided for review.   MIP images are provided for review. Stenosis of the internal carotid arteries measured using NASCET criteria. Dose modulation, iterative reconstruction, and/or weight based adjustment of the mA/kV was utilized to reduce the radiation dose to as low as reasonably achievable. COMPARISON: CT brain earlier same day HISTORY: ORDERING SYSTEM PROVIDED HISTORY: stroke TECHNOLOGIST PROVIDED HISTORY: stroke Decision Support Exception - unselect if not a suspected or confirmed emergency medical condition->Emergency Medical Condition (MA) FINDINGS: CTA NECK: AORTIC ARCH/ARCH VESSELS: No dissection or arterial injury. No significant stenosis of the brachiocephalic or subclavian arteries. CAROTID ARTERIES: No dissection, arterial injury, or hemodynamically significant stenosis by NASCET criteria. VERTEBRAL ARTERIES: No dissection, arterial injury, or significant stenosis. SOFT TISSUES: There are multiple foci of soft tissue gas within the lower left neck, likely related to prior central venous line attempt. A right chest Port-A-Cath is present. The lung apices are clear. There is no pathologically enlarged lymphadenopathy or soft tissue mass identified. BONES: No lytic or blastic osseous lesions are identified. There is a posterior disc osteophyte complex at C5-6 resulting in mild spinal canal stenosis and mild-to-moderate bilateral neural foraminal narrowing. 3D surface reformations were performed and concur with the above findings. CTA HEAD: ANTERIOR CIRCULATION: The intracranial segments of the internal carotid arteries are normal.  There is no significant stenosis or aneurysm identified. The anterior and middle cerebral arteries are normal in appearance. No significant stenosis or aneurysm is identified. POSTERIOR CIRCULATION: The distal vertebral arteries are normal in appearance. The basilar artery is normal.  No significant stenosis or aneurysm is identified. The posterior cerebral arteries are normal in appearance. restricted diffusion to suggest acute infarct     Labs and Images reviewed with:  [x] Dr. Omero Johnson. Sharon    [] Dr. Zen Mohr  [] Dr. Sharla Malone  [] There are no new interval images to review. PHYSICAL EXAM     GENERAL:  Well developed, well nourished, in no acute distress. Intubated on propofol 50  HENT: normocephalic , nose normal  EYES: no occular discharge, no scleral icterus, pupils sluggish  NECK: trachea midline  CV: Normal S1 S2, no MRG  PULM / CHEST: CTA Bilaterally all fields, no WRR  ABDOMEN: soft, no rigidity  MSK: no gross deformity  NEURO: moves all extremities, follows simple commands squeezes hands bilaterally  Intact gag, babinski downgoing L>R    SKIN: no rash, no erythema, cap refill < 2 sec  ---------------------------------------------------------------------------------------------------------------------------------------------------------------------------------------------------------------------------------------------    DRAINS:  [x] There are no drains for Neuro Critical Care to monitor at this time. ASSESSMENT AND PLAN:       ASSESSMENT: 60 yof status epilepticus, known history of brain cancer with metastasis recently diagnosed with press syndrome. NEUROLOGIC:  - Imaging : cta head and neck-No large vessel occlusion, significant stenosis or cerebral aneurysm identified  Cth: No acute intracranial process identified. MRI: Mild chronic white matter changes mild volume loss. No evidence for restricted diffusion to suggest acute infarct   - AEDs received 2grams in ED. Will continue 500mg BID dosing  - Goal SBP normotensive  - Neuro checks per protocol  -currently on propofol 60, versed off    CARDIOVASCULAR:  - Goal SBP normotensive  - Continue telemetry  -trops uptrending : [phone] no ekg changes likely secondary to demand in setting of seizures and anemia (hg 7.7).  Normal renal function  -continue trending trop  -lactic 1.0  -HR:  -120s-150s/60s-80s    PULMONARY:  Vent Information  $Ventilation: $Initial Day  Skin Assessment: Clean, dry, & intact  Suction Catheter Diameter: 14  Equipment ID: [id number]  Vent Type: Servo i  Vent Mode: PRVC  Vt Ordered: 400 mL  Rate Set: 16 bmp  FiO2 : 30 %  SpO2: 99 %  SpO2/FiO2 ratio: 330  Sensitivity: 5  PEEP/CPAP: 8  I Time/ I Time %: 0.8 s  Humidification Source: HME  Nitric Oxide/Epoprostenol In Use?: No  Additional Respiratory  Assessments  Pulse: 102  Resp: 26  SpO2: 99 %  End Tidal CO2: 30  Position: Semi-Jolley's  Humidification Source: HME  Oral Care Completed?: Yes  Oral Care: Lip moisturizer applied, Mouth moisturizer, Mouth suctioned  Subglottic Suction Done?: Yes   XR no acute process  AB.426/42.5/98.7/28  -consider trial of extubation today    RENAL/FLUID/ELECTROLYTE:  - BUN 4/ Creatinine 0.23  - Urine output 0.53 ml/kg/hr  - IVF: LR at 100  - Replace electrolytes PRN  - Daily BMP   -ma.6-->2g mag sulfate      GI/NUTRITION:  NUTRITION:  Diet NPO  - Bowel regimen: glycolax  - GI prophylaxis: pepcid    ID:  - Tmax 100.4 at 8a today  - WBC 19.8   -likely reactive from seizures and elevated in the setting of known malignancy  - Infectious work up none at this time  - Continue to monitor for fevers  - Daily CBC  -ua negative  -chest xray no pna  -procalcitonin negative  -lactic elevation has resolving currently 1.0    HEME:   - H&H 7.7/28.6  - Platelets 1462  - Daily CBC  -heme/onc following: recommending hold chemo, IV iron.   Check peripheral smear, Thrombocytosis likely reactive in nature iron deficiency also contributing      ENDOCRINE:  - Continue to monitor blood glucose, goal <180  - 102-161  -no insulin needs at this time    OTHER:  - PT/OT/ST when appropriate  - Code Status: full    PROPHYLAXIS:  Stress ulcer: H2 blocker    DVT PROPHYLAXIS:  - SCD sleeves - Thigh High   -lovenox 40    DISPOSITION:  [x] To remain ICU:   [] OK for out of ICU from Neuro Critical Care standpoint    We will continue to follow along. For any changes in exam or patient status please contact Neuro Critical Care.       Vee Sarmiento DO  Neuro Critical Care  Pager [phone]  6/19/2021     5:46 AM

## 2021-06-19 NOTE — CARE COORDINATION
Case Management Initial Discharge Plan  Sneha Buchanan,         Met with:patient or family member patient, son and daughter to discuss discharge plans. Information verified: address, contacts, phone number, , insurance Yes  Insurance Provider: Alyssa Pichardo    Emergency Contact/Next of Kin name & number: pt's sonShanda [phone], pt's daughter Catherine Fontana [phone]  Who are involved in patient's support system? children    PCP: Lashaun Valenzuela MD  Date of last visit: last Friday      Discharge Planning    Living Arrangements:  Ana Rosario has 1 story  4 stairs to climb to get into front door    Patient able to perform ADL's:Independent    Current Services (outpatient & in home) none  DME equipment: glucometer  DME provider: n/a    Is patient receiving oral anticoagulation therapy? Yes, Elequis, ASA 81mg daily    If indicated:   Physician managing anticoagulation treatment: n/a  Where does patient obtain lab work for ATC treatment? n/a      Potential Assistance Needed:  N/A    Patient agreeable to home care: No  [place] of choice provided:  n/a    Prior SNF/Rehab Placement and Facility: none  Agreeable to SNF/Rehab: No  [place] of choice provided: n/a     Evaluation: n/a    Expected Discharge date:  21    Patient expects to be discharged to:  Home    If home: is the family and/or caregiver wiling & able to provide support at home? yes  Who will be providing this support? Pt's children    Follow Up Appointment: Best Day/ Time:      Transportation provider: children  Transportation arrangements needed for discharge: No    Readmission Risk              Risk of Unplanned Readmission:  37             Does patient have a readmission risk score greater than 14?: Yes  If yes, follow-up appointment must be made within 7 days of discharge.      Goals of Care:       Educated pt and her children on transitional options, provided freedom of choice and are agreeable with plan      Discharge Plan: Home w/ son, family will transport, pt sees Dr. Napoleon Ryder        Electronically signed by Chiol Santos RN on 6/19/21 at 3:10 PM EDT

## 2021-06-19 NOTE — PLAN OF CARE
while restraints in use  Outcome: Ongoing  Goal: Patient's dignity will be maintained  Outcome: Ongoing

## 2021-06-19 NOTE — PROGRESS NOTES
Today's Date: 6/19/2021  Patient Name: Victoriano Older  Date of admission: 6/18/2021  9:17 AM  Patient's age: 60 y.o., 1965  Admission Dx: Status epilepticus ([address] [G40.901]    Reason for Consult: management recommendations  Requesting Physician: Steven Castleman, MD    CHIEF COMPLAINT:      INTERIM HISTORY  Seen and evaluated, she is just extubated, she is a little lethargic but otherwise feels well. No ongoing seizures    HISTORY OF PRESENT ILLNESS:      The patient is a 60 y.o.  female who is admitted to the hospital for evaluation of unresponsiveness and concern regarding seizures. Patient admitted to the hospital due to concerns regarding seizures. Patient was found unresponsive by her son. Patient does have history of seizures has been on Keppra. Patient was also recently admitted to the neurology service for crest syndrome. Patient was noted to have a episode of seizure in route with ems. Patient also takes Eliquis for history of blood clots. In the ER patient was noted to have 2 more episodes of seizures. Patient MRI brain as well as CTA were unremarkable in regards to any acute event patient is currently being worked up by neurology for status epilepticus    Patient is known to our service. She has history of recurrent ovarian cancer. Original diagnosis was in 2005. Patient has been treated with Doxil Avastin in the past.  More recently she is being treated with gemcitabine patient last chemotherapy treatment was on 6/4/2021. Patient received cycle 5-day 8 of Gemzar. Lab work-up shows platelet count of 1.3  WBC count is 15 hemoglobin is 8.9. Recent iron studies show ferritin of 9. Patient is intubated at the time of evaluation and undergoing EEG.     Past Medical History:   has a past medical history of Anemia, Bleeding, Cervical cancer ([address] Depression, Diabetes mellitus ([address] GERD (gastroesophageal reflux disease), Hx of blood clots, Hypertension, Metastatic cancer ([facility] Ovarian cancer ([facility] Post chemo evaluation, and Splenic lesion. Past Surgical History:   has a past surgical history that includes Hysterectomy, total abdominal; Port Surgery; Tonsillectomy; IR PORT PLACEMENT > 5 YEARS (08/24/2020); Anus surgery; Abscess Drainage (2013); colectomy (03/2013); IR EMBOLIZATION HEMORRHAGE (10/05/2020); and Cardiac catheterization. Medications:    Prior to Admission medications    Medication Sig Start Date End Date Taking? Authorizing Provider   morphine (MS CONTIN) 15 MG extended release tablet Take 1 tablet by mouth 2 times daily for 30 days.  6/7/21 7/7/21  Juli Duke MD   sertraline (ZOLOFT) 100 MG tablet Take 1 tablet by mouth daily 6/7/21 7/7/21  Juli Duke MD   metoprolol succinate (TOPROL XL) 25 MG extended release tablet Take 1 tablet by mouth daily 6/7/21   Juli Duke MD   ELIQUIS 5 MG TABS tablet Take 1 tablet by mouth daily 5/26/21   Historical Provider, MD   pantoprazole (PROTONIX) 40 MG tablet Take 1 tablet by mouth 2 times daily 5/10/21   Juli Duke MD   levETIRAcetam (KEPPRA) 750 MG tablet Take 2 tablets by mouth 2 times daily 5/3/21   Juli Duke MD   ferrous sulfate (FE TABS 325) 325 (65 Fe) MG EC tablet Take 1 tablet by mouth daily (with breakfast) 3/12/21   ESTRELLITA Mcelroy - NP   metFORMIN (GLUCOPHAGE-XR) 500 MG extended release tablet Take 2 tablets by mouth twice daily 2/26/21   Juli Duke MD   aspirin 81 MG chewable tablet Take 81 mg by mouth nightly Pt not taking    Historical Provider, MD     Current Facility-Administered Medications   Medication Dose Route Frequency Provider Last Rate Last Admin    sertraline (ZOLOFT) tablet 100 mg  100 mg Oral Daily Nick Frazier, DO   100 mg at 06/19/21 1224    morphine (MS CONTIN) extended release tablet 15 mg  15 mg Oral 2 times per day Iraida Mendoza, DO   15 mg at 06/19/21 1224    levETIRAcetam (KEPPRA) tablet 1,000 mg  1,000 mg Oral BID Nick Hawa Rosado, DO        apixaban (ELIQUIS) tablet 5 mg  5 mg Oral Daily Berthay Vereniceker, DO   5 mg at 06/19/21 1353    oxyCODONE-acetaminophen (PERCOCET) 5-325 MG per tablet 1 tablet  1 tablet Oral Q4H PRN Gary Whiting, DO   1 tablet at 06/19/21 1423    iron sucrose (VENOFER) 300 mg in sodium chloride 0.9 % 250 mL IVPB  300 mg Intravenous Q24H Gary Whiting, .7 mL/hr at 06/19/21 1614 300 mg at 06/19/21 1614    propofol injection  5-50 mcg/kg/min Intravenous Titrated Carroll Mcclure MD   Paused at 06/19/21 0839    sodium chloride flush 0.9 % injection 5-40 mL  5-40 mL Intravenous 2 times per day Carroll Mcclure MD   10 mL at 06/18/21 2131    sodium chloride flush 0.9 % injection 5-40 mL  5-40 mL Intravenous PRN Carroll Mcclure MD        0.9 % sodium chloride infusion  25 mL Intravenous PRN Carroll Mcclure MD        acetaminophen (TYLENOL) tablet 650 mg  650 mg Oral Q4H PRN Carroll Mcclure MD        sodium chloride flush 0.9 % injection 5-40 mL  5-40 mL Intravenous 2 times per day Gary Whiting, DO        sodium chloride flush 0.9 % injection 5-40 mL  5-40 mL Intravenous PRN Gary Whiting, DO        0.9 % sodium chloride infusion  25 mL Intravenous PRN Gary Whiting, DO        ondansetron (ZOFRAN-ODT) disintegrating tablet 4 mg  4 mg Oral Q8H PRN Gary Whiting, DO        Or    ondansetron (ZOFRAN) injection 4 mg  4 mg Intravenous Q6H PRN Gary Whiting, DO   4 mg at 06/18/21 1414    polyethylene glycol (GLYCOLAX) packet 17 g  17 g Oral Daily PRN Gary Whiting, DO        acetaminophen (TYLENOL) tablet 650 mg  650 mg Oral Q6H PRN Gary Whiting, DO        Or    acetaminophen (TYLENOL) suppository 650 mg  650 mg Rectal Q6H PRN Gary Slicker, DO        lactated ringers infusion   Intravenous Continuous Gary Whiting,  mL/hr at 06/18/21 1252 New Bag at 06/18/21 1252    fentaNYL (SUBLIMAZE) injection 100 mcg  100 mcg Intravenous Q1H PRN Carroll Mcclure MD   100 mcg at 06/19/21 8608  aspirin chewable tablet 324 mg  324 mg Oral Once Nick Ramos Munoz DO        midazolam (VERSED) 1 mg/mL in D5W infusion  1-10 mg/hr Intravenous Continuous Marciann-marie Smith DO   Paused at 21 [id number]       Allergies:  Ceftriaxone    Social History:   reports that she has been smoking cigarettes. She has been smoking about 1.00 pack per day. She uses smokeless tobacco. She reports previous alcohol use. She reports that she does not use drugs. Family History: family history includes Alcohol Abuse in her mother; Cirrhosis in her mother. REVIEW OF SYSTEMS:      Unable to obtain due to altered mental status  PHYSICAL EXAM:        BP (!) 135/47   Pulse 91   Temp 98.4 °F (36.9 °C) (Oral)   Resp 16   Ht 5' 2\" (1.575 m)   Wt 163 lb (73.9 kg)   SpO2 99%   BMI 29.81 kg/m²    Temp (24hrs), Av.2 °F (37.3 °C), Min:98.4 °F (36.9 °C), Max:100.4 °F (38 °C)      General appearance ill-appearing. Intubated. Mental status -intubated and sedated  Eyes - pupils equal and reactive, extraocular eye movements intact   Ears - bilateral TM's and external ear canals normal   Mouth -orally intubated  Neck - supple, no significant adenopathy   Lymphatics - no palpable lymphadenopathy, no hepatosplenomegaly   Chest - clear to auscultation, no wheezes, rales or rhonchi, symmetric air entry   Heart - normal rate, regular rhythm, normal S1, S2, no murmurs  Abdomen - soft, nontender, nondistended, no masses or organomegaly   Neurological -intubated and sedated  Musculoskeletal - no joint tenderness, deformity or swelling   Extremities - peripheral pulses normal, no pedal edema, no clubbing or cyanosis   Skin - normal coloration and turgor, no rashes, no suspicious skin lesions noted ,      DATA:      Labs:     Results for orders placed or performed during the hospital encounter of 21   COVID-19, Rapid    Specimen: Nasopharyngeal Swab   Result Value Ref Range    Specimen Description . NASOPHARYNGEAL SWAB     SARS-CoV-2, Rapid Not Detected Not Detected   CBC Auto Differential   Result Value Ref Range    WBC 15.6 (H) 3.5 - 11.3 k/uL    RBC 4.13 3.95 - 5.11 m/uL    Hemoglobin 8.9 (L) 11.9 - 15.1 g/dL    Hematocrit 34.3 (L) 36.3 - 47.1 %    MCV 83.1 82.6 - 102.9 fL    MCH 21.5 (L) 25.2 - 33.5 pg    MCHC 25.9 (L) 28.4 - 34.8 g/dL    RDW 28.4 (H) 11.8 - 14.4 %    Platelets See Reflexed IPF Result 138 - 453 k/uL    MPV NOT REPORTED 8.1 - 13.5 fL    NRBC Automated 0.0 0.0 per 100 WBC    Differential Type NOT REPORTED     WBC Morphology NOT REPORTED     RBC Morphology NOT REPORTED     Platelet Estimate NOT REPORTED     Immature Granulocytes 0 0 %    Seg Neutrophils 55 36 - 66 %    Lymphocytes 27 24 - 44 %    Monocytes 11 (H) 1 - 7 %    Eosinophils % 6 (H) 1 - 4 %    Basophils 1 0 - 2 %    nRBC 1 (H) 0 per 100 WBC    Absolute Immature Granulocyte 0.00 0.00 - 0.30 k/uL    Segs Absolute 8.57 (H) 1.8 - 7.7 k/uL    Absolute Lymph # 4.21 1.0 - 4.8 k/uL    Absolute Mono # 1.72 (H) 0.1 - 0.8 k/uL    Absolute Eos # 0.94 (H) 0.0 - 0.4 k/uL    Basophils Absolute 0.16 0.0 - 0.2 k/uL    Morphology ANISOCYTOSIS PRESENT     Morphology HYPOCHROMIA PRESENT     Morphology 1+ POLYCHROMASIA     Morphology GIANT PLATELETS PRESENT    Comprehensive Metabolic Panel w/ Reflex to MG   Result Value Ref Range    Glucose 161 (H) 70 - 99 mg/dL    BUN 8 6 - 20 mg/dL    CREATININE 0.43 (L) 0.50 - 0.90 mg/dL    Bun/Cre Ratio NOT REPORTED 9 - 20    Calcium 8.7 8.6 - 10.4 mg/dL    Sodium 140 135 - 144 mmol/L    Potassium 3.9 3.7 - 5.3 mmol/L    Chloride 103 98 - 107 mmol/L    CO2 18 (L) 20 - 31 mmol/L    Anion Gap 19 (H) 9 - 17 mmol/L    Alkaline Phosphatase 64 35 - 104 U/L    ALT 5 5 - 33 U/L    AST 12 <32 U/L    Total Bilirubin <0.10 (L) 0.3 - 1.2 mg/dL    Total Protein 6.7 6.4 - 8.3 g/dL    Albumin 4.0 3.5 - 5.2 g/dL    Albumin/Globulin Ratio 1.5 1.0 - 2.5    GFR Non-African American >60 >60 mL/min    GFR African American >60 >60 mL/min    GFR Comment          GFR Staging mmol/L    Lactic Acid, Whole Blood 1.4 0.7 - 2.1 mmol/L   Magnesium   Result Value Ref Range    Magnesium 1.6 1.6 - 2.6 mg/dL   Phosphorus   Result Value Ref Range    Phosphorus 3.8 2.6 - 4.5 mg/dL   Troponin   Result Value Ref Range    Troponin, High Sensitivity 115 (HH) 0 - 14 ng/L    Troponin T NOT REPORTED <0.03 ng/mL    Troponin Interp NOT REPORTED    Lactic Acid, Plasma   Result Value Ref Range    Lactic Acid NOT REPORTED mmol/L    Lactic Acid, Whole Blood 1.1 0.7 - 2.1 mmol/L   Troponin   Result Value Ref Range    Troponin, High Sensitivity 111 (HH) 0 - 14 ng/L    Troponin T NOT REPORTED <0.03 ng/mL    Troponin Interp NOT REPORTED    Immature Platelet Fraction   Result Value Ref Range    Platelet, Immature Fraction 1.7 1.1 - 10.3 %    Platelet, Fluorescence 1,108 (HH) 138 - 453 k/uL   Procalcitonin   Result Value Ref Range    Procalcitonin 0.08 <0.09 ng/mL   Reticulocytes   Result Value Ref Range    Retic % 0.3 (L) 0.5 - 1.9 %    Absolute Retic # 0.010 (L) 0.030 - 0.080 M/uL    Immature Retic Fract 7.600 2.7 - 18.3 %    Retic Hemoglobin 16.3 (L) 28.2 - 35.7 pg   Venous Blood Gas, POC   Result Value Ref Range    pH, Steffen 7.195 (LL) 7.320 - 7.430    pCO2, Steffen 42.9 41.0 - 51.0 mm Hg    pO2, Steffen 77.5 (H) 30 - 50 mm Hg    HCO3, Venous 16.6 (L) 22.0 - 29.0 mmol/L    Total CO2, Venous NOT REPORTED 23.0 - 30.0 mmol/L    Negative Base Excess, Steffen 11 (H) 0.0 - 2.0    Positive Base Excess, Steffen NOT REPORTED 0.0 - 3.0    O2 Sat, Steffen 92 (H) 60.0 - 85.0 %    O2 Device/Flow/% NOT REPORTED     Joe Test NOT REPORTED     Sample Site NOT REPORTED     Mode NOT REPORTED     FIO2 NOT REPORTED     Pt Temp NOT REPORTED     POC pH Temp NOT REPORTED     POC pCO2 Temp NOT REPORTED mm Hg    POC pO2 Temp NOT REPORTED mm Hg   Creatinine W/GFR Point of Care   Result Value Ref Range    POC Creatinine 0.58 0.51 - 1.19 mg/dL    GFR Comment >60 >60 mL/min    GFR Non-African American >60 >60 mL/min    GFR Comment         POCT urea (BUN) Result Value Ref Range    POC BUN 8 8 - 26 mg/dL   Lactic Acid, POC   Result Value Ref Range    POC Lactic Acid 9.98 (H) 0.56 - 1.39 mmol/L   POCT Glucose   Result Value Ref Range    POC Glucose 159 (H) 74 - 100 mg/dL   Arterial Blood Gas, POC   Result Value Ref Range    POC pH 7.319 (L) 7.350 - 7.450    POC pCO2 42.5 35.0 - 48.0 mm Hg    POC PO2 173.9 (H) 83.0 - 108.0 mm Hg    POC HCO3 21.8 21.0 - 28.0 mmol/L    TCO2 (calc), Art NOT REPORTED 22.0 - 29.0 mmol/L    Negative Base Excess, Art 4 (H) 0.0 - 2.0    Positive Base Excess, Art NOT REPORTED 0.0 - 3.0    POC O2 SAT 99 (H) 94.0 - 98.0 %    O2 Device/Flow/% Adult Ventilator     Joe Test POSITIVE     Sample Site Left Radial Artery     Mode PRVC     FIO2 50.0     Pt Temp NOT REPORTED     POC pH Temp NOT REPORTED     POC pCO2 Temp NOT REPORTED mm Hg    POC pO2 Temp NOT REPORTED mm Hg   Arterial Blood Gas, POC   Result Value Ref Range    POC pH 7.426 7.350 - 7.450    POC pCO2 42.5 35.0 - 48.0 mm Hg    POC PO2 98.7 83.0 - 108.0 mm Hg    POC HCO3 28.0 21.0 - 28.0 mmol/L    TCO2 (calc), Art NOT REPORTED 22.0 - 29.0 mmol/L    Negative Base Excess, Art NOT REPORTED 0.0 - 2.0    Positive Base Excess, Art 3 0.0 - 3.0    POC O2 SAT 98 94.0 - 98.0 %    O2 Device/Flow/% Adult Ventilator     Joe Test POSITIVE     Sample Site Left Radial Artery     Mode NOT REPORTED     FIO2 30.0     Pt Temp NOT REPORTED     POC pH Temp NOT REPORTED     POC pCO2 Temp NOT REPORTED mm Hg    POC pO2 Temp NOT REPORTED mm Hg   EKG 12 Lead   Result Value Ref Range    Ventricular Rate 129 BPM    Atrial Rate 129 BPM    P-R Interval 148 ms    QRS Duration 88 ms    Q-T Interval 316 ms    QTc Calculation (Bazett) 462 ms    P Axis 74 degrees    R Axis 39 degrees    T Axis 92 degrees   EKG 12 Lead   Result Value Ref Range    Ventricular Rate 98 BPM    Atrial Rate 98 BPM    P-R Interval 166 ms    QRS Duration 82 ms    Q-T Interval 362 ms    QTc Calculation (Bazett) 462 ms    P Axis 60 degrees    R Axis 26 degrees    T Axis 74 degrees         IMAGING DATA:    CT Head WO Contrast    Result Date: 6/18/2021  EXAMINATION: CT OF THE HEAD WITHOUT CONTRAST  6/18/2021 9:19 am TECHNIQUE: CT of the head was performed without the administration of intravenous contrast. Dose modulation, iterative reconstruction, and/or weight based adjustment of the mA/kV was utilized to reduce the radiation dose to as low as reasonably achievable. COMPARISON: 03/09/2021 HISTORY: ORDERING SYSTEM PROVIDED HISTORY: stroke TECHNOLOGIST PROVIDED HISTORY: stroke Decision Support Exception - unselect if not a suspected or confirmed emergency medical condition->Emergency Medical Condition (MA) FINDINGS: BRAIN/VENTRICLES: There is no acute infarct or acute intracranial hemorrhage present. There is no mass effect or midline shift present. There is no ventriculomegaly or abnormal extra-axial fluid collection present. ORBITS: Limited evaluation of the orbits is unremarkable. SINUSES: The paranasal sinuses and mastoid air cells are clear. SOFT TISSUES/SKULL:  No lytic or blastic osseous lesions are identified. No acute intracranial process identified. The findings were sent to the Radiology Results [id number] at 9:41 am on 6/18/2021to be communicated to the Stroke Neurology service. XR CHEST PORTABLE    Result Date: 6/18/2021  EXAMINATION: ONE XRAY VIEW OF THE CHEST 6/18/2021 10:22 am COMPARISON: AP chest from 03/09/2021; CT chest from 03/09/2021 HISTORY: ORDERING SYSTEM PROVIDED HISTORY: post intubation TECHNOLOGIST PROVIDED HISTORY: post intubation History of ovarian cancer and osseous metastases with previous splenic embolization. FINDINGS: ETT tip position satisfactory approximately 4 cm above the alejandro. Enteric tube tip and side hole project below left hemidiaphragm. Right IJ port in unchanged position, with good tip position in the upper right atrium. Coils splenic artery LUQ. Enlarged but stable appearing cardiac silhouette. Port-A-Cath is present. The lung apices are clear. There is no pathologically enlarged lymphadenopathy or soft tissue mass identified. BONES: No lytic or blastic osseous lesions are identified. There is a posterior disc osteophyte complex at C5-6 resulting in mild spinal canal stenosis and mild-to-moderate bilateral neural foraminal narrowing. 3D surface reformations were performed and concur with the above findings. CTA HEAD: ANTERIOR CIRCULATION: The intracranial segments of the internal carotid arteries are normal.  There is no significant stenosis or aneurysm identified. The anterior and middle cerebral arteries are normal in appearance. No significant stenosis or aneurysm is identified. POSTERIOR CIRCULATION: The distal vertebral arteries are normal in appearance. The basilar artery is normal.  No significant stenosis or aneurysm is identified. The posterior cerebral arteries are normal in appearance. OTHER: No dural venous sinus thrombosis on this non-dedicated study. BRAIN: There is no mass effect or midline shift. There is no vascular malformation identified. 3D surface reformations were performed and concur with the above findings. 1. No large vessel occlusion, significant stenosis or cerebral aneurysm identified. 2. No significant arterial stenosis identified within the neck. 3. Several foci of gas lucency within the lower left neck. Correlation with any history of attempted left central venous catheter placement is recommended.      MRI LIMITED BRAIN    Result Date: 6/18/2021  EXAMINATION: MRI OF THE BRAIN WITHOUT CONTRAST  6/18/2021 11:03 am TECHNIQUE: Multiplanar multisequence MRI of the brain was performed without the administration of intravenous contrast. COMPARISON: October 23, 2020 HISTORY: ORDERING SYSTEM PROVIDED HISTORY: acute right sided weakness, seizures, prior hx of PRES TECHNOLOGIST PROVIDED HISTORY: acute right sided weakness, seizures, prior hx of PRES Reason for Exam: cva, rt sided weakness FINDINGS: INTRACRANIAL STRUCTURES/VENTRICLES: There is no restricted diffusion to suggest acute infarct. No mass effect or midline shift. No evidence of an acute intracranial hemorrhage. The ventricles and sulci are normal in size and configuration. Scattered white matter changes in the subcortical white matter periventricular white matter present. There is no evidence for blood products on gradient imaging. The sellar/suprasellar regions appear unremarkable. The normal signal voids within the major intracranial vessels appear maintained. ORBITS: The visualized portion of the orbits demonstrate no acute abnormality. SINUSES: The visualized paranasal sinuses and mastoid air cells are well aerated. BONES/SOFT TISSUES: The bone marrow signal intensity appears normal. The soft tissues demonstrate no acute abnormality. Mild chronic white matter changes mild volume loss. No evidence for restricted diffusion to suggest acute infarct         IMPRESSION:   Primary Problem  <principal problem not specified>    Active Hospital Problems    Diagnosis Date Noted    [facility] [G40.901] 01/04/2021     Altered mental status  Recurrent ovarian cancer  Thrombocytosis  Iron deficiency      RECOMMENDATIONS:  1. I personally reviewed results of lab work-up imaging studies and other relevant clinical data. 2. Agree with adjustment of the dose of Keppra  3. Blood counts are stable. Anemia secondary to chemotherapy. And iron deficiency. We will plan to recheck the ferritin and offer IV iron as needed        Discussed with  Nurse. Thank you for asking us to see this patient. This note is created with the assistance of a speech recognition program.  While intending to generate a document that actually reflects the content of the visit, the document can still have some errors including those of syntax and sound a like substitutions which may escape proof reading.   It such

## 2021-06-20 ENCOUNTER — APPOINTMENT (OUTPATIENT)
Dept: GENERAL RADIOLOGY | Age: 56
DRG: 100 | End: 2021-06-20
Payer: COMMERCIAL

## 2021-06-20 PROBLEM — Z86.718 HISTORY OF DEEP VENOUS THROMBOSIS (DVT) OF DISTAL VEIN OF LEFT LOWER EXTREMITY: Status: ACTIVE | Noted: 2021-06-20

## 2021-06-20 LAB
ABSOLUTE EOS #: 0 K/UL (ref 0–0.4)
ABSOLUTE IMMATURE GRANULOCYTE: 0 K/UL (ref 0–0.3)
ABSOLUTE LYMPH #: 0.58 K/UL (ref 1–4.8)
ABSOLUTE MONO #: 1.45 K/UL (ref 0.1–0.8)
ALBUMIN SERPL-MCNC: 3.1 G/DL (ref 3.5–5.2)
ALBUMIN/GLOBULIN RATIO: 1.3 (ref 1–2.5)
ALP BLD-CCNC: 52 U/L (ref 35–104)
ALT SERPL-CCNC: 5 U/L (ref 5–33)
ANION GAP SERPL CALCULATED.3IONS-SCNC: 8 MMOL/L (ref 9–17)
AST SERPL-CCNC: 14 U/L
BASOPHILS # BLD: 1 % (ref 0–2)
BASOPHILS ABSOLUTE: 0.15 K/UL (ref 0–0.2)
BILIRUB SERPL-MCNC: 0.21 MG/DL (ref 0.3–1.2)
BLOOD BANK SPECIMEN: NORMAL
BUN BLDV-MCNC: 3 MG/DL (ref 6–20)
BUN/CREAT BLD: ABNORMAL (ref 9–20)
CALCIUM SERPL-MCNC: 8.2 MG/DL (ref 8.6–10.4)
CHLORIDE BLD-SCNC: 105 MMOL/L (ref 98–107)
CO2: 26 MMOL/L (ref 20–31)
CREAT SERPL-MCNC: 0.23 MG/DL (ref 0.5–0.9)
DIFFERENTIAL TYPE: ABNORMAL
EOSINOPHILS RELATIVE PERCENT: 0 % (ref 1–4)
GFR AFRICAN AMERICAN: >60 ML/MIN
GFR NON-AFRICAN AMERICAN: >60 ML/MIN
GFR SERPL CREATININE-BSD FRML MDRD: ABNORMAL ML/MIN/{1.73_M2}
GFR SERPL CREATININE-BSD FRML MDRD: ABNORMAL ML/MIN/{1.73_M2}
GLUCOSE BLD-MCNC: 82 MG/DL (ref 70–99)
HCT VFR BLD CALC: 25.7 % (ref 36.3–47.1)
HCT VFR BLD CALC: 26.7 % (ref 36.3–47.1)
HCT VFR BLD CALC: 28.7 % (ref 36.3–47.1)
HEMOGLOBIN: 6.8 G/DL (ref 11.9–15.1)
HEMOGLOBIN: 6.9 G/DL (ref 11.9–15.1)
HEMOGLOBIN: 8 G/DL (ref 11.9–15.1)
IMMATURE GRANULOCYTES: 0 %
LACTIC ACID, WHOLE BLOOD: 0.9 MMOL/L (ref 0.7–2.1)
LACTIC ACID, WHOLE BLOOD: 1.1 MMOL/L (ref 0.7–2.1)
LACTIC ACID, WHOLE BLOOD: 1.2 MMOL/L (ref 0.7–2.1)
LACTIC ACID: NORMAL MMOL/L
LYMPHOCYTES # BLD: 4 % (ref 24–44)
MAGNESIUM: 1.8 MG/DL (ref 1.6–2.6)
MCH RBC QN AUTO: 20.8 PG (ref 25.2–33.5)
MCHC RBC AUTO-ENTMCNC: 25.5 G/DL (ref 28.4–34.8)
MCV RBC AUTO: 81.7 FL (ref 82.6–102.9)
MONOCYTES # BLD: 10 % (ref 1–7)
MORPHOLOGY: ABNORMAL
NRBC AUTOMATED: 0 PER 100 WBC
PDW BLD-RTO: 27.9 % (ref 11.8–14.4)
PHOSPHORUS: 2.8 MG/DL (ref 2.6–4.5)
PLATELET # BLD: ABNORMAL K/UL (ref 138–453)
PLATELET ESTIMATE: ABNORMAL
PLATELET, FLUORESCENCE: 1109 K/UL (ref 138–453)
PLATELET, IMMATURE FRACTION: 1.4 % (ref 1.1–10.3)
PMV BLD AUTO: ABNORMAL FL (ref 8.1–13.5)
POTASSIUM SERPL-SCNC: 3.4 MMOL/L (ref 3.7–5.3)
RBC # BLD: 3.27 M/UL (ref 3.95–5.11)
RBC # BLD: ABNORMAL 10*6/UL
SEG NEUTROPHILS: 85 % (ref 36–66)
SEGMENTED NEUTROPHILS ABSOLUTE COUNT: 12.32 K/UL (ref 1.8–7.7)
SODIUM BLD-SCNC: 139 MMOL/L (ref 135–144)
TOTAL PROTEIN: 5.5 G/DL (ref 6.4–8.3)
WBC # BLD: 14.5 K/UL (ref 3.5–11.3)
WBC # BLD: ABNORMAL 10*3/UL

## 2021-06-20 PROCEDURE — 2580000003 HC RX 258: Performed by: STUDENT IN AN ORGANIZED HEALTH CARE EDUCATION/TRAINING PROGRAM

## 2021-06-20 PROCEDURE — 85014 HEMATOCRIT: CPT

## 2021-06-20 PROCEDURE — 85025 COMPLETE CBC W/AUTO DIFF WBC: CPT

## 2021-06-20 PROCEDURE — 6360000002 HC RX W HCPCS: Performed by: STUDENT IN AN ORGANIZED HEALTH CARE EDUCATION/TRAINING PROGRAM

## 2021-06-20 PROCEDURE — 99233 SBSQ HOSP IP/OBS HIGH 50: CPT | Performed by: PSYCHIATRY & NEUROLOGY

## 2021-06-20 PROCEDURE — 84100 ASSAY OF PHOSPHORUS: CPT

## 2021-06-20 PROCEDURE — 86850 RBC ANTIBODY SCREEN: CPT

## 2021-06-20 PROCEDURE — 36430 TRANSFUSION BLD/BLD COMPNT: CPT

## 2021-06-20 PROCEDURE — 94761 N-INVAS EAR/PLS OXIMETRY MLT: CPT

## 2021-06-20 PROCEDURE — 6370000000 HC RX 637 (ALT 250 FOR IP): Performed by: STUDENT IN AN ORGANIZED HEALTH CARE EDUCATION/TRAINING PROGRAM

## 2021-06-20 PROCEDURE — 85018 HEMOGLOBIN: CPT

## 2021-06-20 PROCEDURE — 99233 SBSQ HOSP IP/OBS HIGH 50: CPT | Performed by: INTERNAL MEDICINE

## 2021-06-20 PROCEDURE — 83605 ASSAY OF LACTIC ACID: CPT

## 2021-06-20 PROCEDURE — 86900 BLOOD TYPING SEROLOGIC ABO: CPT

## 2021-06-20 PROCEDURE — 85055 RETICULATED PLATELET ASSAY: CPT

## 2021-06-20 PROCEDURE — 80053 COMPREHEN METABOLIC PANEL: CPT

## 2021-06-20 PROCEDURE — 86920 COMPATIBILITY TEST SPIN: CPT

## 2021-06-20 PROCEDURE — 86901 BLOOD TYPING SEROLOGIC RH(D): CPT

## 2021-06-20 PROCEDURE — 86870 RBC ANTIBODY IDENTIFICATION: CPT

## 2021-06-20 PROCEDURE — 36415 COLL VENOUS BLD VENIPUNCTURE: CPT

## 2021-06-20 PROCEDURE — 99232 SBSQ HOSP IP/OBS MODERATE 35: CPT | Performed by: INTERNAL MEDICINE

## 2021-06-20 PROCEDURE — 2000000003 HC NEURO ICU R&B

## 2021-06-20 PROCEDURE — 83735 ASSAY OF MAGNESIUM: CPT

## 2021-06-20 PROCEDURE — P9016 RBC LEUKOCYTES REDUCED: HCPCS

## 2021-06-20 PROCEDURE — 86902 BLOOD TYPE ANTIGEN DONOR EA: CPT

## 2021-06-20 PROCEDURE — 86880 COOMBS TEST DIRECT: CPT

## 2021-06-20 RX ORDER — SODIUM CHLORIDE 9 MG/ML
INJECTION, SOLUTION INTRAVENOUS PRN
Status: DISCONTINUED | OUTPATIENT
Start: 2021-06-20 | End: 2021-06-24 | Stop reason: HOSPADM

## 2021-06-20 RX ORDER — POTASSIUM CHLORIDE 20 MEQ/1
40 TABLET, EXTENDED RELEASE ORAL ONCE
Status: COMPLETED | OUTPATIENT
Start: 2021-06-20 | End: 2021-06-20

## 2021-06-20 RX ORDER — SODIUM CHLORIDE 9 MG/ML
INJECTION, SOLUTION INTRAVENOUS PRN
Status: CANCELLED | OUTPATIENT
Start: 2021-06-20

## 2021-06-20 RX ADMIN — APIXABAN 5 MG: 5 TABLET, FILM COATED ORAL at 09:08

## 2021-06-20 RX ADMIN — LEVETIRACETAM 1000 MG: 500 TABLET, FILM COATED ORAL at 09:07

## 2021-06-20 RX ADMIN — SODIUM CHLORIDE, PRESERVATIVE FREE 10 ML: 5 INJECTION INTRAVENOUS at 09:09

## 2021-06-20 RX ADMIN — IRON SUCROSE 300 MG: 20 INJECTION, SOLUTION INTRAVENOUS at 14:28

## 2021-06-20 RX ADMIN — OXYCODONE HYDROCHLORIDE AND ACETAMINOPHEN 1 TABLET: 5; 325 TABLET ORAL at 14:20

## 2021-06-20 RX ADMIN — MORPHINE SULFATE 15 MG: 15 TABLET, EXTENDED RELEASE ORAL at 20:12

## 2021-06-20 RX ADMIN — ACETAMINOPHEN 650 MG: 325 TABLET ORAL at 19:37

## 2021-06-20 RX ADMIN — POTASSIUM CHLORIDE 40 MEQ: 1500 TABLET, EXTENDED RELEASE ORAL at 09:10

## 2021-06-20 RX ADMIN — LEVETIRACETAM 1000 MG: 500 TABLET, FILM COATED ORAL at 19:37

## 2021-06-20 RX ADMIN — SERTRALINE 100 MG: 50 TABLET, FILM COATED ORAL at 09:08

## 2021-06-20 RX ADMIN — OXYCODONE HYDROCHLORIDE AND ACETAMINOPHEN 1 TABLET: 5; 325 TABLET ORAL at 18:08

## 2021-06-20 RX ADMIN — SODIUM CHLORIDE, PRESERVATIVE FREE 10 ML: 5 INJECTION INTRAVENOUS at 09:07

## 2021-06-20 RX ADMIN — OXYCODONE HYDROCHLORIDE AND ACETAMINOPHEN 1 TABLET: 5; 325 TABLET ORAL at 23:12

## 2021-06-20 RX ADMIN — MORPHINE SULFATE 15 MG: 15 TABLET, EXTENDED RELEASE ORAL at 09:10

## 2021-06-20 RX ADMIN — OXYCODONE HYDROCHLORIDE AND ACETAMINOPHEN 1 TABLET: 5; 325 TABLET ORAL at 04:31

## 2021-06-20 ASSESSMENT — ENCOUNTER SYMPTOMS
VOMITING: 0
ABDOMINAL DISTENTION: 0
NAUSEA: 0
COUGH: 0
PHOTOPHOBIA: 0
CONSTIPATION: 1
WHEEZING: 0
BLOOD IN STOOL: 0
SHORTNESS OF BREATH: 0

## 2021-06-20 ASSESSMENT — PAIN DESCRIPTION - PAIN TYPE: TYPE: OTHER (COMMENT)

## 2021-06-20 ASSESSMENT — PAIN SCALES - GENERAL
PAINLEVEL_OUTOF10: 7
PAINLEVEL_OUTOF10: 8
PAINLEVEL_OUTOF10: 7

## 2021-06-20 ASSESSMENT — PAIN DESCRIPTION - LOCATION: LOCATION: THROAT

## 2021-06-20 NOTE — PROGRESS NOTES
Daily Progress Note  Neuro Critical Care    Patient Name: [facility]  Patient : 1965  Room/Bed: 0530/0530-01  Code Status: full  Allergies: Allergies   Allergen Reactions    Ceftriaxone      Had a rash on ceftriaxone 2020, [place]       CHIEF COMPLAINT:     Status epilepticus     INTERVAL HISTORY    Madhuri Bernal a 60 y. o. female who presents with RACE alert.   last known well 8:35 AM per report of son at bedside. History provided by son at bedside patient was talking to the son who went to check on his mom at home because he \"felt something was off\". Per son Bucky Mcgraw has unable to respond to his qeustions. Son denies seizure-like activity, but patient does have history of seizures, is on Keppra and has been taking as prescribed. Alex Orr has history of metastatic ovarian cancer. Was recently admitted to the neurology service for PRES syndrome.  In route, EMS reports the patient had an episode ofseizure-like activity, administered 2 mg Versed with resolution of seizure-like activity. Patient has prescribed Eliquis for prophylaxis secondary to her high risk of venous thromboembolism secondary to known cancer. Report from son is that she has been compliant. In the emergency department patient had 2 episodes of generalized tonic-clonic like seizures each of which were aborted with 2 mg of Ativan patient was loaded with 1 g of Keppra she subsequently underwent an MRI after negative CT head and CTA head and neck the MRI was read as negative for any acute infarct. Last 24h:    No acute events overnight.  100.4 temp this am     Last 24 H: Patient was stable overnight. On 3 L nasal cannula oxygen. Hemoglobin dropped to 6.8 most likely secondary to chemotherapy. Heme-onc following. 1 unit of PRBC being transfused today. Mild hypokalemia. Potassium p.o. replaced. Plan to transfer patient to internal medicine for severe anemia.   On exam, alert oriented and following (!) 134/51  89 17 99 %     Estimated body mass index is 29.81 kg/m² as calculated from the following:    Height as of this encounter: 5' 2\" (1.575 m). Weight as of this encounter: 163 lb (73.9 kg).  []<16 Severe malnutrition  []1616.99 Moderate malnutrition  []1718.49 Mild malnutrition  []18.524.9 Normal  [x]2529.9 Overweight (not obese)  []3034.9 Obese class 1 (Low Risk)  []3539.9 Obese class 2 (Moderate Risk)  []?40 Obese class 3 (High Risk)    RECENT LABS:   Lab Results   Component Value Date    WBC 14.5 (H) 06/20/2021    HGB 6.8 (LL) 06/20/2021    HCT 26.7 (L) 06/20/2021    PLT See Reflexed IPF Result 06/20/2021    CHOL 131 03/10/2021    TRIG 147 03/10/2021    HDL 33 (L) 03/10/2021    ALT 5 06/20/2021    AST 14 06/20/2021     06/20/2021    K 3.4 (L) 06/20/2021     06/20/2021    CREATININE 0.23 (L) 06/20/2021    BUN 3 (L) 06/20/2021    CO2 26 06/20/2021    TSH 2.93 03/09/2021    INR 0.9 06/18/2021    LABA1C 6.0 03/10/2021     24 HOUR INTAKE/OUTPUT:    Intake/Output Summary (Last 24 hours) at 6/20/2021 6185  Last data filed at 6/20/2021 0400  Gross per 24 hour   Intake 2899 ml   Output 2975 ml   Net -76 ml     CT Head WO Contrast    Result Date: 6/18/2021  EXAMINATION: CT OF THE HEAD WITHOUT CONTRAST  6/18/2021 9:19 am TECHNIQUE: CT of the head was performed without the administration of intravenous contrast. Dose modulation, iterative reconstruction, and/or weight based adjustment of the mA/kV was utilized to reduce the radiation dose to as low as reasonably achievable. COMPARISON: 03/09/2021 HISTORY: ORDERING SYSTEM PROVIDED HISTORY: stroke TECHNOLOGIST PROVIDED HISTORY: stroke Decision Support Exception - unselect if not a suspected or confirmed emergency medical condition->Emergency Medical Condition (MA) FINDINGS: BRAIN/VENTRICLES: There is no acute infarct or acute intracranial hemorrhage present. There is no mass effect or midline shift present.  There is no ventriculomegaly or abnormal extra-axial fluid collection present. ORBITS: Limited evaluation of the orbits is unremarkable. SINUSES: The paranasal sinuses and mastoid air cells are clear. SOFT TISSUES/SKULL:  No lytic or blastic osseous lesions are identified. No acute intracranial process identified. The findings were sent to the Radiology Results [id number] at 9:41 am on 6/18/2021to be communicated to the Stroke Neurology service. XR CHEST PORTABLE    Result Date: 6/18/2021  EXAMINATION: ONE XRAY VIEW OF THE CHEST 6/18/2021 10:22 am COMPARISON: AP chest from 03/09/2021; CT chest from 03/09/2021 HISTORY: ORDERING SYSTEM PROVIDED HISTORY: post intubation TECHNOLOGIST PROVIDED HISTORY: post intubation History of ovarian cancer and osseous metastases with previous splenic embolization. FINDINGS: ETT tip position satisfactory approximately 4 cm above the alejandro. Enteric tube tip and side hole project below left hemidiaphragm. Right IJ port in unchanged position, with good tip position in the upper right atrium. Coils splenic artery LUQ. Enlarged but stable appearing cardiac silhouette. Mediastinal structures appropriate for slight rotation to the left. Low lung volumes; mildly increased interstitial markings with scattered parenchymal densities and unchanged increased hilar shadows, L>R. Minimal unchanged blunting left costophrenic angle. No new pulmonary or right pleural abnormality. Scattered bony sclerotic foci, compatible with known Mets. No destructive lesion or obvious interval change. Support tubes satisfactory. Similar enlarged susan (known adenopathy), scattered parenchymal densities (known pulmonary metastases) without significant interval change; probable smaller left pleural effusion.      CTA HEAD NECK W CONTRAST    Result Date: 6/18/2021  EXAMINATION: CTA OF THE HEAD AND NECK WITH CONTRAST 6/18/2021 9:19 am: TECHNIQUE: CTA of the head and neck was performed with the administration of intravenous contrast. Multiplanar reformatted images are provided for review. MIP images are provided for review. Stenosis of the internal carotid arteries measured using NASCET criteria. Dose modulation, iterative reconstruction, and/or weight based adjustment of the mA/kV was utilized to reduce the radiation dose to as low as reasonably achievable. COMPARISON: CT brain earlier same day HISTORY: ORDERING SYSTEM PROVIDED HISTORY: stroke TECHNOLOGIST PROVIDED HISTORY: stroke Decision Support Exception - unselect if not a suspected or confirmed emergency medical condition->Emergency Medical Condition (MA) FINDINGS: CTA NECK: AORTIC ARCH/ARCH VESSELS: No dissection or arterial injury. No significant stenosis of the brachiocephalic or subclavian arteries. CAROTID ARTERIES: No dissection, arterial injury, or hemodynamically significant stenosis by NASCET criteria. VERTEBRAL ARTERIES: No dissection, arterial injury, or significant stenosis. SOFT TISSUES: There are multiple foci of soft tissue gas within the lower left neck, likely related to prior central venous line attempt. A right chest Port-A-Cath is present. The lung apices are clear. There is no pathologically enlarged lymphadenopathy or soft tissue mass identified. BONES: No lytic or blastic osseous lesions are identified. There is a posterior disc osteophyte complex at C5-6 resulting in mild spinal canal stenosis and mild-to-moderate bilateral neural foraminal narrowing. 3D surface reformations were performed and concur with the above findings. CTA HEAD: ANTERIOR CIRCULATION: The intracranial segments of the internal carotid arteries are normal.  There is no significant stenosis or aneurysm identified. The anterior and middle cerebral arteries are normal in appearance. No significant stenosis or aneurysm is identified. POSTERIOR CIRCULATION: The distal vertebral arteries are normal in appearance.   The basilar artery is normal.  No significant stenosis or aneurysm is identified. The posterior cerebral arteries are normal in appearance. OTHER: No dural venous sinus thrombosis on this non-dedicated study. BRAIN: There is no mass effect or midline shift. There is no vascular malformation identified. 3D surface reformations were performed and concur with the above findings. 1. No large vessel occlusion, significant stenosis or cerebral aneurysm identified. 2. No significant arterial stenosis identified within the neck. 3. Several foci of gas lucency within the lower left neck. Correlation with any history of attempted left central venous catheter placement is recommended. MRI LIMITED BRAIN    Result Date: 6/18/2021  EXAMINATION: MRI OF THE BRAIN WITHOUT CONTRAST  6/18/2021 11:03 am TECHNIQUE: Multiplanar multisequence MRI of the brain was performed without the administration of intravenous contrast. COMPARISON: October 23, 2020 HISTORY: ORDERING SYSTEM PROVIDED HISTORY: acute right sided weakness, seizures, prior hx of PRES TECHNOLOGIST PROVIDED HISTORY: acute right sided weakness, seizures, prior hx of PRES Reason for Exam: cva, rt sided weakness FINDINGS: INTRACRANIAL STRUCTURES/VENTRICLES: There is no restricted diffusion to suggest acute infarct. No mass effect or midline shift. No evidence of an acute intracranial hemorrhage. The ventricles and sulci are normal in size and configuration. Scattered white matter changes in the subcortical white matter periventricular white matter present. There is no evidence for blood products on gradient imaging. The sellar/suprasellar regions appear unremarkable. The normal signal voids within the major intracranial vessels appear maintained. ORBITS: The visualized portion of the orbits demonstrate no acute abnormality. SINUSES: The visualized paranasal sinuses and mastoid air cells are well aerated. BONES/SOFT TISSUES: The bone marrow signal intensity appears normal. The soft tissues demonstrate no acute abnormality.      Mild chronic white matter changes mild volume loss. No evidence for restricted diffusion to suggest acute infarct     Labs and Images reviewed with:  [x] Dr. Kristopher Herrera    [] Dr. Mary Diaz  [] Dr. Andrew Donahue  [] There are no new interval images to review. PHYSICAL EXAM     GENERAL:  Well developed, well nourished, in no acute distress. Alert awake and following commands  HENT: normocephalic , nose normal  EYES: EOM intact  NECK: trachea midline  CV: Normal S1 S2, no MRG  PULM / CHEST: CTA Bilaterally all fields, no WRR  ABDOMEN: soft, no rigidity  MSK: no gross deformity  NEURO: Moves all extremities, motor 5/5, sensory system intact. SKIN: no rash, no erythema, cap refill < 2 sec  ---------------------------------------------------------------------------------------------------------------------------------------------------------------------------------------------------------------------------------------------    DRAINS:  [x] There are no drains for Neuro Critical Care to monitor at this time. ASSESSMENT AND PLAN:       ASSESSMENT: 60 yof status epilepticus, known history of brain cancer with metastasis recently diagnosed with press syndrome. Severe anemia being trasnfused 1 unit. Heme onc following. Plan to stepdown to IM. NEUROLOGIC:  - Imaging : cta head and neck-No large vessel occlusion, significant stenosis or cerebral aneurysm identified  Cth: No acute intracranial process identified. MRI: Mild chronic white matter changes mild volume loss. No evidence for restricted diffusion to suggest acute infarct   - AEDs received 2grams in ED.  Will continue 1000 mg BID dosing.  - Goal SBP normotensive  - Neuro checks per protocol  -currently on propofol 60, versed off    CARDIOVASCULAR:  - Goal SBP normotensive  - Continue telemetry  -continue trending trop  -lactic 1.1  -HR:   -120s-150s/60s-80s    PULMONARY:  On room air    RENAL/FLUID/ELECTROLYTE:  - BUN 4/ Creatinine 0.23  - Urine output 0.53 ml/kg/hr.  -K+: 3.4.  P.o. 40 MEq potassium given  - IVF: LR at 100  - Replace electrolytes PRN  - Daily BMP   -ma.6-->2g mag sulfate      GI/NUTRITION:  NUTRITION:  ADULT DIET; Regular  - Bowel regimen: glycolax  - GI prophylaxis: pepcid    ID:  - Tmax 98.3  - WBC 14.5   -likely reactive from seizures and elevated in the setting of known malignancy  - Infectious work up none at this time  - Continue to monitor for fevers  - Daily CBC  -ua negative  -chest xray no pna  -procalcitonin negative  -lactic elevation has resolving currently 1.0    HEME:   - H&H 6.9/25.7  -1 unit PRBC given  - Platelets 155 from   - Daily CBC  -heme/onc following: Iron 300 mg every 24 hours for 3 days. ENDOCRINE:  - Continue to monitor blood glucose, goal <180  -no insulin needs at this time    OTHER:  - PT/OT/ST when appropriate  - Code Status: full    PROPHYLAXIS:  Stress ulcer: H2 blocker    DVT PROPHYLAXIS:  - SCD sleeves - Thigh High   -lovenox 40    DISPOSITION:  [] To remain ICU:   [x] OK for out of ICU from Neuro Critical Care standpoint    We will continue to follow along. For any changes in exam or patient status please contact Neuro Critical Care.       Leti Rich MD  Neuro Critical Care  Pager [phone]  2021     6:27 AM

## 2021-06-20 NOTE — PLAN OF CARE
Problem: SKIN INTEGRITY  Goal: Skin integrity is maintained or improved  6/20/2021 1715 by Huma Peace RN  Outcome: Met This Shift  Skin assessed for breakdown and redness, patient turned regularly, and heels elevated off of bed on pillows.

## 2021-06-20 NOTE — PROGRESS NOTES
Physical Therapy        Physical Therapy Cancel Note      DATE: 2021    NAME: Yonatan Koenig  MRN: [id number]   : 1965      Patient not seen this date for Physical Therapy due to:    Blood Transfusion:  hgb 6.9 with plan to transfuse. PT will check back as time allows or 21.        Electronically signed by Eben Santillan PT on 2021 at 11:07 AM

## 2021-06-20 NOTE — PROGRESS NOTES
Today's Date: 6/20/2021  Patient Name: Lakshmi Alejandro  Date of admission: 6/18/2021  9:17 AM  Patient's age: 60 y.o., 1965  Admission Dx: Status epilepticus ([facility] [address] [G40.901]    Reason for Consult: management recommendations  Requesting Physician: Jaren Velázquez MD    CHIEF COMPLAINT:      INTERIM HISTORY  Seen and evaluated,   She is alert and oriented, she is complaining of some dizziness especially when sits up  No seizures overnight    HISTORY OF PRESENT ILLNESS:      The patient is a 60 y.o.  female who is admitted to the hospital for evaluation of unresponsiveness and concern regarding seizures. Patient admitted to the hospital due to concerns regarding seizures. Patient was found unresponsive by her son. Patient does have history of seizures has been on Keppra. Patient was also recently admitted to the neurology service for crest syndrome. Patient was noted to have a episode of seizure in route with ems. Patient also takes Eliquis for history of blood clots. In the ER patient was noted to have 2 more episodes of seizures. Patient MRI brain as well as CTA were unremarkable in regards to any acute event patient is currently being worked up by neurology for status epilepticus    Patient is known to our service. She has history of recurrent ovarian cancer. Original diagnosis was in 2005. Patient has been treated with Doxil Avastin in the past.  More recently she is being treated with gemcitabine patient last chemotherapy treatment was on 6/4/2021. Patient received cycle 5-day 8 of Gemzar. Lab work-up shows platelet count of 1.3  WBC count is 15 hemoglobin is 8.9. Recent iron studies show ferritin of 9. Patient is intubated at the time of evaluation and undergoing EEG.     Past Medical History:   has a past medical history of Anemia, Bleeding, Cervical cancer ([address] Depression, Diabetes mellitus ([address] GERD (gastroesophageal reflux disease), Hx of blood clots, Hypertension, Metastatic cancer ([facility] [address] Ovarian cancer ([facility] [address] Post chemo evaluation, and Splenic lesion. Past Surgical History:   has a past surgical history that includes Hysterectomy, total abdominal; Port Surgery; Tonsillectomy; IR PORT PLACEMENT > 5 YEARS (08/24/2020); Anus surgery; Abscess Drainage (2013); colectomy (03/2013); IR EMBOLIZATION HEMORRHAGE (10/05/2020); and Cardiac catheterization. Medications:    Prior to Admission medications    Medication Sig Start Date End Date Taking? Authorizing Provider   morphine (MS CONTIN) 15 MG extended release tablet Take 1 tablet by mouth 2 times daily for 30 days.  6/7/21 7/7/21  Brigette Jiang MD   sertraline (ZOLOFT) 100 MG tablet Take 1 tablet by mouth daily 6/7/21 7/7/21  Brigette Jiang MD   metoprolol succinate (TOPROL XL) 25 MG extended release tablet Take 1 tablet by mouth daily 6/7/21   Brgiette Jiang MD   ELIQUIS 5 MG TABS tablet Take 1 tablet by mouth daily 5/26/21   Historical Provider, MD   pantoprazole (PROTONIX) 40 MG tablet Take 1 tablet by mouth 2 times daily 5/10/21   Brigette Jiang MD   levETIRAcetam (KEPPRA) 750 MG tablet Take 2 tablets by mouth 2 times daily 5/3/21   Brigette Jiang MD   ferrous sulfate (FE TABS 325) 325 (65 Fe) MG EC tablet Take 1 tablet by mouth daily (with breakfast) 3/12/21   ESTRELLITA Smith - NP   metFORMIN (GLUCOPHAGE-XR) 500 MG extended release tablet Take 2 tablets by mouth twice daily 2/26/21   Brigette Jiang MD   aspirin 81 MG chewable tablet Take 81 mg by mouth nightly Pt not taking    Historical Provider, MD     Current Facility-Administered Medications   Medication Dose Route Frequency Provider Last Rate Last Admin    0.9 % sodium chloride infusion   Intravenous PRN Jose Gould MD        sertraline (ZOLOFT) tablet 100 mg  100 mg Oral Daily Ron Card DO   100 mg at 06/20/21 0908    morphine (MS CONTIN) extended release tablet 15 mg  15 mg Oral 2 times per day Brandin Mejia Grattan, DO   15 mg at 06/20/21 0910    levETIRAcetam (KEPPRA) tablet 1,000 mg  1,000 mg Oral BID Clarance Oh, DO   1,000 mg at 06/20/21 9146    apixaban (ELIQUIS) tablet 5 mg  5 mg Oral Daily Clarance Oh, DO   5 mg at 06/20/21 0908    oxyCODONE-acetaminophen (PERCOCET) 5-325 MG per tablet 1 tablet  1 tablet Oral Q4H PRN Clarance Oh, DO   1 tablet at 06/20/21 0431    iron sucrose (VENOFER) 300 mg in sodium chloride 0.9 % 250 mL IVPB  300 mg Intravenous Q24H Clarance Oh, DO   Stopped at 06/19/21 2000    propofol injection  5-50 mcg/kg/min Intravenous Titrated Margarita Miller MD   Paused at 06/19/21 0839    sodium chloride flush 0.9 % injection 5-40 mL  5-40 mL Intravenous 2 times per day Margarita Miller MD   10 mL at 06/20/21 0907    sodium chloride flush 0.9 % injection 5-40 mL  5-40 mL Intravenous PRN Margarita Miller MD        0.9 % sodium chloride infusion  25 mL Intravenous PRN Margarita Miller MD        acetaminophen (TYLENOL) tablet 650 mg  650 mg Oral Q4H PRN Margarita Miller MD        sodium chloride flush 0.9 % injection 5-40 mL  5-40 mL Intravenous 2 times per day Clarance Oh, DO   10 mL at 06/20/21 0909    sodium chloride flush 0.9 % injection 5-40 mL  5-40 mL Intravenous PRN Clarance Oh, DO        0.9 % sodium chloride infusion  25 mL Intravenous PRN Clarance Oh, DO        ondansetron (ZOFRAN-ODT) disintegrating tablet 4 mg  4 mg Oral Q8H PRN Clarance Oh, DO        Or    ondansetron (ZOFRAN) injection 4 mg  4 mg Intravenous Q6H PRN Clarance Oh, DO   4 mg at 06/18/21 1414    polyethylene glycol (GLYCOLAX) packet 17 g  17 g Oral Daily PRN Clarance Oh, DO        acetaminophen (TYLENOL) tablet 650 mg  650 mg Oral Q6H PRN Clarance Oh, DO        Or    acetaminophen (TYLENOL) suppository 650 mg  650 mg Rectal Q6H PRN Clarance Oh, DO        lactated ringers infusion   Intravenous Continuous Clarance Oh,  mL/hr at 06/18/21 1252 New Bag at Rapid    Specimen: Nasopharyngeal Swab   Result Value Ref Range    Specimen Description . NASOPHARYNGEAL SWAB     SARS-CoV-2, Rapid Not Detected Not Detected   CBC Auto Differential   Result Value Ref Range    WBC 15.6 (H) 3.5 - 11.3 k/uL    RBC 4.13 3.95 - 5.11 m/uL    Hemoglobin 8.9 (L) 11.9 - 15.1 g/dL    Hematocrit 34.3 (L) 36.3 - 47.1 %    MCV 83.1 82.6 - 102.9 fL    MCH 21.5 (L) 25.2 - 33.5 pg    MCHC 25.9 (L) 28.4 - 34.8 g/dL    RDW 28.4 (H) 11.8 - 14.4 %    Platelets See Reflexed IPF Result 138 - 453 k/uL    MPV NOT REPORTED 8.1 - 13.5 fL    NRBC Automated 0.0 0.0 per 100 WBC    Differential Type NOT REPORTED     WBC Morphology NOT REPORTED     RBC Morphology NOT REPORTED     Platelet Estimate NOT REPORTED     Immature Granulocytes 0 0 %    Seg Neutrophils 55 36 - 66 %    Lymphocytes 27 24 - 44 %    Monocytes 11 (H) 1 - 7 %    Eosinophils % 6 (H) 1 - 4 %    Basophils 1 0 - 2 %    nRBC 1 (H) 0 per 100 WBC    Absolute Immature Granulocyte 0.00 0.00 - 0.30 k/uL    Segs Absolute 8.57 (H) 1.8 - 7.7 k/uL    Absolute Lymph # 4.21 1.0 - 4.8 k/uL    Absolute Mono # 1.72 (H) 0.1 - 0.8 k/uL    Absolute Eos # 0.94 (H) 0.0 - 0.4 k/uL    Basophils Absolute 0.16 0.0 - 0.2 k/uL    Morphology ANISOCYTOSIS PRESENT     Morphology HYPOCHROMIA PRESENT     Morphology 1+ POLYCHROMASIA     Morphology GIANT PLATELETS PRESENT    Comprehensive Metabolic Panel w/ Reflex to MG   Result Value Ref Range    Glucose 161 (H) 70 - 99 mg/dL    BUN 8 6 - 20 mg/dL    CREATININE 0.43 (L) 0.50 - 0.90 mg/dL    Bun/Cre Ratio NOT REPORTED 9 - 20    Calcium 8.7 8.6 - 10.4 mg/dL    Sodium 140 135 - 144 mmol/L    Potassium 3.9 3.7 - 5.3 mmol/L    Chloride 103 98 - 107 mmol/L    CO2 18 (L) 20 - 31 mmol/L    Anion Gap 19 (H) 9 - 17 mmol/L    Alkaline Phosphatase 64 35 - 104 U/L    ALT 5 5 - 33 U/L    AST 12 <32 U/L    Total Bilirubin <0.10 (L) 0.3 - 1.2 mg/dL    Total Protein 6.7 6.4 - 8.3 g/dL    Albumin 4.0 3.5 - 5.2 g/dL    Albumin/Globulin Ratio 1.5 1.0 - 2.5    GFR Non-African American >60 >60 mL/min    GFR African American >60 >60 mL/min    GFR Comment          GFR Staging NOT REPORTED    APTT   Result Value Ref Range    PTT 21.0 20.5 - 30.5 sec   Protime-INR   Result Value Ref Range    Protime 10.1 9.1 - 12.3 sec    INR 0.9    Troponin   Result Value Ref Range    Troponin, High Sensitivity 7 0 - 14 ng/L    Troponin T NOT REPORTED <0.03 ng/mL    Troponin Interp NOT REPORTED    Urinalysis Reflex to Culture    Specimen: Urine, clean catch   Result Value Ref Range    Color, UA YELLOW YELLOW    Turbidity UA CLEAR CLEAR    Glucose, Ur NEGATIVE NEGATIVE    Bilirubin Urine NEGATIVE NEGATIVE    Ketones, Urine TRACE (A) NEGATIVE    Specific Gravity, UA 1.035 (H) 1.005 - 1.030    Urine Hgb NEGATIVE NEGATIVE    pH, UA 5.0 5.0 - 8.0    Protein, UA TRACE (A) NEGATIVE    Urobilinogen, Urine Normal Normal    Nitrite, Urine NEGATIVE NEGATIVE    Leukocyte Esterase, Urine NEGATIVE NEGATIVE    Urinalysis Comments NOT REPORTED    Lactic Acid, Plasma   Result Value Ref Range    Lactic Acid NOT REPORTED mmol/L    Lactic Acid, Whole Blood 12.1 (H) 0.7 - 2.1 mmol/L   ELECTROLYTES PLUS   Result Value Ref Range    POC Sodium 143 138 - 146 mmol/L    POC Potassium 3.6 3.5 - 4.5 mmol/L    POC Chloride 108 (H) 98 - 107 mmol/L    POC TCO2 18 (L) 22 - 30 mmol/L    Anion Gap 18 (H) 7 - 16 mmol/L   Hemoglobin and hematocrit, blood   Result Value Ref Range    POC Hemoglobin 12.1 12.0 - 16.0 g/dL    POC Hematocrit 36 36 - 46 %   CALCIUM, IONIC (POC)   Result Value Ref Range    POC Ionized Calcium 1.14 (L) 1.15 - 1.33 mmol/L   Levetiracetam Level   Result Value Ref Range    Levetiracetam Lvl 23 ug/mL   Immature Platelet Fraction   Result Value Ref Range    Platelet, Immature Fraction 2.3 1.1 - 10.3 %    Platelet, Fluorescence 1,382 (HH) 138 - 453 k/uL   Troponin   Result Value Ref Range    Troponin, High Sensitivity 156 (HH) 0 - 14 ng/L    Troponin T NOT REPORTED <0.03 ng/mL    Troponin Interp Result Value Ref Range    WBC 19.8 (H) 3.5 - 11.3 k/uL    RBC 3.56 (L) 3.95 - 5.11 m/uL    Hemoglobin 7.7 (L) 11.9 - 15.1 g/dL    Hematocrit 28.6 (L) 36.3 - 47.1 %    MCV 80.3 (L) 82.6 - 102.9 fL    MCH 21.6 (L) 25.2 - 33.5 pg    MCHC 26.9 (L) 28.4 - 34.8 g/dL    RDW 27.7 (H) 11.8 - 14.4 %    Platelets See Reflexed IPF Result 138 - 453 k/uL    MPV NOT REPORTED 8.1 - 13.5 fL    NRBC Automated 0.0 0.0 per 100 WBC    Differential Type NOT REPORTED     WBC Morphology NOT REPORTED     RBC Morphology NOT REPORTED     Platelet Estimate NOT REPORTED     Immature Granulocytes 0 0 %    Seg Neutrophils 82 (H) 36 - 66 %    Lymphocytes 9 (L) 24 - 44 %    Monocytes 9 (H) 1 - 7 %    Eosinophils % 0 (L) 1 - 4 %    Basophils 0 0 - 2 %    Absolute Immature Granulocyte 0.00 0.00 - 0.30 k/uL    Segs Absolute 16.24 (H) 1.8 - 7.7 k/uL    Absolute Lymph # 1.78 1.0 - 4.8 k/uL    Absolute Mono # 1.78 (H) 0.1 - 0.8 k/uL    Absolute Eos # 0.00 0.0 - 0.4 k/uL    Basophils Absolute 0.00 0.0 - 0.2 k/uL    Morphology ANISOCYTOSIS PRESENT     Morphology MICROCYTOSIS PRESENT     Morphology HYPOCHROMIA PRESENT     Morphology 1+ POLYCHROMASIA    Comprehensive Metabolic Panel   Result Value Ref Range    Glucose 102 (H) 70 - 99 mg/dL    BUN 4 (L) 6 - 20 mg/dL    CREATININE 0.23 (L) 0.50 - 0.90 mg/dL    Bun/Cre Ratio NOT REPORTED 9 - 20    Calcium 8.7 8.6 - 10.4 mg/dL    Sodium 137 135 - 144 mmol/L    Potassium 3.9 3.7 - 5.3 mmol/L    Chloride 102 98 - 107 mmol/L    CO2 26 20 - 31 mmol/L    Anion Gap 9 9 - 17 mmol/L    Alkaline Phosphatase 55 35 - 104 U/L    ALT 5 5 - 33 U/L    AST 19 <32 U/L    Total Bilirubin 0.16 (L) 0.3 - 1.2 mg/dL    Total Protein 5.6 (L) 6.4 - 8.3 g/dL    Albumin 3.1 (L) 3.5 - 5.2 g/dL    Albumin/Globulin Ratio 1.2 1.0 - 2.5    GFR Non-African American >60 >60 mL/min    GFR African American >60 >60 mL/min    GFR Comment          GFR Staging NOT REPORTED    Lactic Acid, Plasma   Result Value Ref Range    Lactic Acid NOT REPORTED mmol/L    Lactic Acid, Whole Blood 1.0 0.7 - 2.1 mmol/L   Lactic Acid, Plasma   Result Value Ref Range    Lactic Acid NOT REPORTED mmol/L    Lactic Acid, Whole Blood 1.4 0.7 - 2.1 mmol/L   Magnesium   Result Value Ref Range    Magnesium 1.6 1.6 - 2.6 mg/dL   Phosphorus   Result Value Ref Range    Phosphorus 3.8 2.6 - 4.5 mg/dL   Troponin   Result Value Ref Range    Troponin, High Sensitivity 115 (HH) 0 - 14 ng/L    Troponin T NOT REPORTED <0.03 ng/mL    Troponin Interp NOT REPORTED    Lactic Acid, Plasma   Result Value Ref Range    Lactic Acid NOT REPORTED mmol/L    Lactic Acid, Whole Blood 1.1 0.7 - 2.1 mmol/L   Troponin   Result Value Ref Range    Troponin, High Sensitivity 111 (HH) 0 - 14 ng/L    Troponin T NOT REPORTED <0.03 ng/mL    Troponin Interp NOT REPORTED    Immature Platelet Fraction   Result Value Ref Range    Platelet, Immature Fraction 1.7 1.1 - 10.3 %    Platelet, Fluorescence 1,108 (HH) 138 - 453 k/uL   Lactic Acid, Plasma   Result Value Ref Range    Lactic Acid NOT REPORTED mmol/L    Lactic Acid, Whole Blood 1.0 0.7 - 2.1 mmol/L   Procalcitonin   Result Value Ref Range    Procalcitonin 0.08 <0.09 ng/mL   Reticulocytes   Result Value Ref Range    Retic % 0.3 (L) 0.5 - 1.9 %    Absolute Retic # 0.010 (L) 0.030 - 0.080 M/uL    Immature Retic Fract 7.600 2.7 - 18.3 %    Retic Hemoglobin 16.3 (L) 28.2 - 35.7 pg   Lactic Acid, Plasma   Result Value Ref Range    Lactic Acid NOT REPORTED mmol/L    Lactic Acid, Whole Blood 0.9 0.7 - 2.1 mmol/L   Lactic Acid, Plasma   Result Value Ref Range    Lactic Acid NOT REPORTED mmol/L    Lactic Acid, Whole Blood 1.2 0.7 - 2.1 mmol/L   Ferritin   Result Value Ref Range    Ferritin 13 13 - 150 ug/L   CBC auto differential   Result Value Ref Range    WBC 14.5 (H) 3.5 - 11.3 k/uL    RBC 3.27 (L) 3.95 - 5.11 m/uL    Hemoglobin 6.8 (LL) 11.9 - 15.1 g/dL    Hematocrit 26.7 (L) 36.3 - 47.1 %    MCV 81.7 (L) 82.6 - 102.9 fL    MCH 20.8 (L) 25.2 - 33.5 pg    MCHC 25.5 (L) 28.4 - 34.8 g/dL    RDW 27.9 (H) 11.8 - 14.4 %    Platelets See Reflexed IPF Result 138 - 453 k/uL    MPV NOT REPORTED 8.1 - 13.5 fL    NRBC Automated 0.0 0.0 per 100 WBC    Differential Type NOT REPORTED     WBC Morphology NOT REPORTED     RBC Morphology NOT REPORTED     Platelet Estimate NOT REPORTED     Immature Granulocytes 0 0 %    Seg Neutrophils 85 (H) 36 - 66 %    Lymphocytes 4 (L) 24 - 44 %    Monocytes 10 (H) 1 - 7 %    Eosinophils % 0 (L) 1 - 4 %    Basophils 1 0 - 2 %    Absolute Immature Granulocyte 0.00 0.00 - 0.30 k/uL    Segs Absolute 12.32 (H) 1.8 - 7.7 k/uL    Absolute Lymph # 0.58 (L) 1.0 - 4.8 k/uL    Absolute Mono # 1.45 (H) 0.1 - 0.8 k/uL    Absolute Eos # 0.00 0.0 - 0.4 k/uL    Basophils Absolute 0.15 0.0 - 0.2 k/uL    Morphology MICROCYTOSIS PRESENT     Morphology ANISOCYTOSIS PRESENT     Morphology HYPOCHROMIA PRESENT     Morphology 1+ POLYCHROMASIA    Comprehensive Metabolic Panel   Result Value Ref Range    Glucose 82 70 - 99 mg/dL    BUN 3 (L) 6 - 20 mg/dL    CREATININE 0.23 (L) 0.50 - 0.90 mg/dL    Bun/Cre Ratio NOT REPORTED 9 - 20    Calcium 8.2 (L) 8.6 - 10.4 mg/dL    Sodium 139 135 - 144 mmol/L    Potassium 3.4 (L) 3.7 - 5.3 mmol/L    Chloride 105 98 - 107 mmol/L    CO2 26 20 - 31 mmol/L    Anion Gap 8 (L) 9 - 17 mmol/L    Alkaline Phosphatase 52 35 - 104 U/L    ALT 5 5 - 33 U/L    AST 14 <32 U/L    Total Bilirubin 0.21 (L) 0.3 - 1.2 mg/dL    Total Protein 5.5 (L) 6.4 - 8.3 g/dL    Albumin 3.1 (L) 3.5 - 5.2 g/dL    Albumin/Globulin Ratio 1.3 1.0 - 2.5    GFR Non-African American >60 >60 mL/min    GFR African American >60 >60 mL/min    GFR Comment          GFR Staging NOT REPORTED    Magnesium   Result Value Ref Range    Magnesium 1.8 1.6 - 2.6 mg/dL   Phosphorus   Result Value Ref Range    Phosphorus 2.8 2.6 - 4.5 mg/dL   Lactic Acid, Plasma   Result Value Ref Range    Lactic Acid NOT REPORTED mmol/L    Lactic Acid, Whole Blood 1.1 0.7 - 2.1 mmol/L   Immature Platelet Fraction   Result Value Ref Range    Platelet, Immature Fraction 1.4 1.1 - 10.3 %    Platelet, Fluorescence 1,109 (HH) 138 - 453 k/uL   HEMOGLOBIN AND HEMATOCRIT, BLOOD   Result Value Ref Range    Hemoglobin 6.9 (LL) 11.9 - 15.1 g/dL    Hematocrit 25.7 (L) 36.3 - 47.1 %   Venous Blood Gas, POC   Result Value Ref Range    pH, Steffen 7.195 (LL) 7.320 - 7.430    pCO2, Steffen 42.9 41.0 - 51.0 mm Hg    pO2, Steffen 77.5 (H) 30 - 50 mm Hg    HCO3, Venous 16.6 (L) 22.0 - 29.0 mmol/L    Total CO2, Venous NOT REPORTED 23.0 - 30.0 mmol/L    Negative Base Excess, Steffen 11 (H) 0.0 - 2.0    Positive Base Excess, Steffen NOT REPORTED 0.0 - 3.0    O2 Sat, Steffen 92 (H) 60.0 - 85.0 %    O2 Device/Flow/% NOT REPORTED     Joe Test NOT REPORTED     Sample Site NOT REPORTED     Mode NOT REPORTED     FIO2 NOT REPORTED     Pt Temp NOT REPORTED     POC pH Temp NOT REPORTED     POC pCO2 Temp NOT REPORTED mm Hg    POC pO2 Temp NOT REPORTED mm Hg   Creatinine W/GFR Point of Care   Result Value Ref Range    POC Creatinine 0.58 0.51 - 1.19 mg/dL    GFR Comment >60 >60 mL/min    GFR Non-African American >60 >60 mL/min    GFR Comment         POCT urea (BUN)   Result Value Ref Range    POC BUN 8 8 - 26 mg/dL   Lactic Acid, POC   Result Value Ref Range    POC Lactic Acid 9.98 (H) 0.56 - 1.39 mmol/L   POCT Glucose   Result Value Ref Range    POC Glucose 159 (H) 74 - 100 mg/dL   Arterial Blood Gas, POC   Result Value Ref Range    POC pH 7.319 (L) 7.350 - 7.450    POC pCO2 42.5 35.0 - 48.0 mm Hg    POC PO2 173.9 (H) 83.0 - 108.0 mm Hg    POC HCO3 21.8 21.0 - 28.0 mmol/L    TCO2 (calc), Art NOT REPORTED 22.0 - 29.0 mmol/L    Negative Base Excess, Art 4 (H) 0.0 - 2.0    Positive Base Excess, Art NOT REPORTED 0.0 - 3.0    POC O2 SAT 99 (H) 94.0 - 98.0 %    O2 Device/Flow/% Adult Ventilator     Oje Test POSITIVE     Sample Site Left Radial Artery     Mode PRVC     FIO2 50.0     Pt Temp NOT REPORTED     POC pH Temp NOT REPORTED     POC pCO2 Temp NOT REPORTED mm Hg    POC pO2 Temp NOT REPORTED mm Hg   Arterial Blood Gas, POC   Result Value Ref Range    POC pH 7.426 7.350 - 7.450    POC pCO2 42.5 35.0 - 48.0 mm Hg    POC PO2 98.7 83.0 - 108.0 mm Hg    POC HCO3 28.0 21.0 - 28.0 mmol/L    TCO2 (calc), Art NOT REPORTED 22.0 - 29.0 mmol/L    Negative Base Excess, Art NOT REPORTED 0.0 - 2.0    Positive Base Excess, Art 3 0.0 - 3.0    POC O2 SAT 98 94.0 - 98.0 %    O2 Device/Flow/% Adult Ventilator     Joe Test POSITIVE     Sample Site Left Radial Artery     Mode NOT REPORTED     FIO2 30.0     Pt Temp NOT REPORTED     POC pH Temp NOT REPORTED     POC pCO2 Temp NOT REPORTED mm Hg    POC pO2 Temp NOT REPORTED mm Hg   EKG 12 Lead   Result Value Ref Range    Ventricular Rate 129 BPM    Atrial Rate 129 BPM    P-R Interval 148 ms    QRS Duration 88 ms    Q-T Interval 316 ms    QTc Calculation (Bazett) 462 ms    P Axis 74 degrees    R Axis 39 degrees    T Axis 92 degrees   EKG 12 Lead   Result Value Ref Range    Ventricular Rate 98 BPM    Atrial Rate 98 BPM    P-R Interval 166 ms    QRS Duration 82 ms    Q-T Interval 362 ms    QTc Calculation (Bazett) 462 ms    P Axis 60 degrees    R Axis 26 degrees    T Axis 74 degrees   TYPE AND SCREEN   Result Value Ref Range    Expiration Date 06/23/2021,2359     Arm Band Number [id number]     ABO/Rh A POSITIVE     Antibody Screen NOT TESTED     Antibody ID Anti-Kasia Present  PREVIOUSLY IDENTIFIED       AMBER IgG NEGATIVE     Unit Number [id number]     Product Code Leukocyte Reduced Red Cell     Unit Divison 00     Dispense Status ISSUED     Transfusion Status OK TO TRANSFUSE     Crossmatch Result COMPATIBLE     Unit Number [id number]     Product Code Leukocyte Reduced Red Cell     Unit Divison 00     Dispense Status ALLOCATED     Transfusion Status OK TO TRANSFUSE     Crossmatch Result COMPATIBLE    BLOOD BANK SPECIMEN   Result Value Ref Range    Blood Bank Specimen NOT REPORTED          IMAGING DATA:    CT Head WO Contrast    Result Date: 6/18/2021  EXAMINATION: CT OF THE HEAD WITHOUT CONTRAST  6/18/2021 9:19 am TECHNIQUE: CT of the head was performed without the administration of intravenous contrast. Dose modulation, iterative reconstruction, and/or weight based adjustment of the mA/kV was utilized to reduce the radiation dose to as low as reasonably achievable. COMPARISON: 03/09/2021 HISTORY: ORDERING SYSTEM PROVIDED HISTORY: stroke TECHNOLOGIST PROVIDED HISTORY: stroke Decision Support Exception - unselect if not a suspected or confirmed emergency medical condition->Emergency Medical Condition (MA) FINDINGS: BRAIN/VENTRICLES: There is no acute infarct or acute intracranial hemorrhage present. There is no mass effect or midline shift present. There is no ventriculomegaly or abnormal extra-axial fluid collection present. ORBITS: Limited evaluation of the orbits is unremarkable. SINUSES: The paranasal sinuses and mastoid air cells are clear. SOFT TISSUES/SKULL:  No lytic or blastic osseous lesions are identified. No acute intracranial process identified. The findings were sent to the Radiology Results [id number] at 9:41 am on 6/18/2021to be communicated to the Stroke Neurology service. XR CHEST PORTABLE    Result Date: 6/18/2021  EXAMINATION: ONE XRAY VIEW OF THE CHEST 6/18/2021 10:22 am COMPARISON: AP chest from 03/09/2021; CT chest from 03/09/2021 HISTORY: ORDERING SYSTEM PROVIDED HISTORY: post intubation TECHNOLOGIST PROVIDED HISTORY: post intubation History of ovarian cancer and osseous metastases with previous splenic embolization. FINDINGS: ETT tip position satisfactory approximately 4 cm above the alejandro. Enteric tube tip and side hole project below left hemidiaphragm. Right IJ port in unchanged position, with good tip position in the upper right atrium. Coils splenic artery LUQ. Enlarged but stable appearing cardiac silhouette. Mediastinal structures appropriate for slight rotation to the left.  Low lung volumes; mildly increased interstitial markings with scattered parenchymal densities and unchanged increased hilar shadows, L>R. Minimal unchanged blunting left costophrenic angle. No new pulmonary or right pleural abnormality. Scattered bony sclerotic foci, compatible with known Mets. No destructive lesion or obvious interval change. Support tubes satisfactory. Similar enlarged susan (known adenopathy), scattered parenchymal densities (known pulmonary metastases) without significant interval change; probable smaller left pleural effusion. CTA HEAD NECK W CONTRAST    Result Date: 6/18/2021  EXAMINATION: CTA OF THE HEAD AND NECK WITH CONTRAST 6/18/2021 9:19 am: TECHNIQUE: CTA of the head and neck was performed with the administration of intravenous contrast. Multiplanar reformatted images are provided for review. MIP images are provided for review. Stenosis of the internal carotid arteries measured using NASCET criteria. Dose modulation, iterative reconstruction, and/or weight based adjustment of the mA/kV was utilized to reduce the radiation dose to as low as reasonably achievable. COMPARISON: CT brain earlier same day HISTORY: ORDERING SYSTEM PROVIDED HISTORY: stroke TECHNOLOGIST PROVIDED HISTORY: stroke Decision Support Exception - unselect if not a suspected or confirmed emergency medical condition->Emergency Medical Condition (MA) FINDINGS: CTA NECK: AORTIC ARCH/ARCH VESSELS: No dissection or arterial injury. No significant stenosis of the brachiocephalic or subclavian arteries. CAROTID ARTERIES: No dissection, arterial injury, or hemodynamically significant stenosis by NASCET criteria. VERTEBRAL ARTERIES: No dissection, arterial injury, or significant stenosis. SOFT TISSUES: There are multiple foci of soft tissue gas within the lower left neck, likely related to prior central venous line attempt. A right chest Port-A-Cath is present. The lung apices are clear.   There is no pathologically enlarged lymphadenopathy or soft tissue mass identified. BONES: No lytic or blastic osseous lesions are identified. There is a posterior disc osteophyte complex at C5-6 resulting in mild spinal canal stenosis and mild-to-moderate bilateral neural foraminal narrowing. 3D surface reformations were performed and concur with the above findings. CTA HEAD: ANTERIOR CIRCULATION: The intracranial segments of the internal carotid arteries are normal.  There is no significant stenosis or aneurysm identified. The anterior and middle cerebral arteries are normal in appearance. No significant stenosis or aneurysm is identified. POSTERIOR CIRCULATION: The distal vertebral arteries are normal in appearance. The basilar artery is normal.  No significant stenosis or aneurysm is identified. The posterior cerebral arteries are normal in appearance. OTHER: No dural venous sinus thrombosis on this non-dedicated study. BRAIN: There is no mass effect or midline shift. There is no vascular malformation identified. 3D surface reformations were performed and concur with the above findings. 1. No large vessel occlusion, significant stenosis or cerebral aneurysm identified. 2. No significant arterial stenosis identified within the neck. 3. Several foci of gas lucency within the lower left neck. Correlation with any history of attempted left central venous catheter placement is recommended. MRI LIMITED BRAIN    Result Date: 6/18/2021  EXAMINATION: MRI OF THE BRAIN WITHOUT CONTRAST  6/18/2021 11:03 am TECHNIQUE: Multiplanar multisequence MRI of the brain was performed without the administration of intravenous contrast. COMPARISON: October 23, 2020 HISTORY: ORDERING SYSTEM PROVIDED HISTORY: acute right sided weakness, seizures, prior hx of PRES TECHNOLOGIST PROVIDED HISTORY: acute right sided weakness, seizures, prior hx of PRES Reason for Exam: cva, rt sided weakness FINDINGS: INTRACRANIAL STRUCTURES/VENTRICLES: There is no restricted diffusion to suggest acute infarct.  No

## 2021-06-20 NOTE — PROGRESS NOTES
[employer]  Occupational Therapy Not Seen Note    DATE: 2021    NAME: Lyn Mullins  MRN: [id number]   : 1965      Patient not seen this date for Occupational Therapy due to:    Blood Transfusion: Hg 6.9    Next Scheduled Treatment: Ck if appropriate      Electronically signed by Yang Mcgraw OT on 2021 at 11:01 AM

## 2021-06-20 NOTE — PROGRESS NOTES
[facility]  Office: [address], DO, Michela Giles, DO, Heydi Morel, DO, Arelibhakti Cleveland Blood, DO, Marilee Santos MD, Ashley Pedersen MD, Layne Martinez MD, Karely Bae MD, Evy Barber MD, Deven Zamudio MD, Patrice Lopez MD, Navya Sanders MD, Jared Sherwood, DO, Tasia Mejias MD, Diann Novoa DO, Kylah Wilhelm MD,  Bang Miles, DO, Huber Shepard MD, Deyanira Colunga MD, Carmine Mathis MD, Robin Turcios MD, Rosamaria Rodriguez, Leah Villasenor, CNP, Jorge Quevedo, CNP, Michele Samuels, CNS, Carla Majano, CNP, Simin Aburto, CNP, Pooja Bose, CNP, Shruti Shen, CNP, Sunday Thierry, CNP, TESFAYE HagerC, Joaquim Essex, [facility], Atul Mcfadden, CNP, Tray Velasquez, CNP, Simona Alvarado, CNP, [place] Lawanda, CNP, Pritesh Pena, CNP, Azeem Parra, [facility]    Progress Note    6/20/2021    4:17 PM    Name:   Sneha Buchanan  MRN:     [id number]     Acct:      [de-identified]   Room:   [facility] Day:  2  Admit Date:  6/18/2021  9:17 AM    PCP:   Lashaun Valenzuela MD  Code Status:  Full Code    Subjective:     C/C:   Chief Complaint   Patient presents with    Cerebrovascular Accident     brought in per EMS        Interval History Status: The patient has been seen for the assumption of care  No further seizures  Still quite weak  Doing fair with diet    Data Base Updates:  Hemoglobin 6.9Low Panic   Platelet, Fluorescence 1,109High Panic     Potassium3. 4Low     Lactic Acid, Whole Blood1.1       Brief History:     As documented in the medical record:  \"60year-old patient admitted with status epilepticus -multiple witnessed generalized tonic-clonic seizures, acute encephalopathy secondary to seizures    Of notepatient was diagnosed with PRES syndrome in February 2021 and was started on antiseizure medications.  She has history of metastatic ovarian cancer and is on chemotherapy and is being followed by heme-onc team.   Extubated 6/19 and protecting airway well   Continue increased keppra dose 1000 mg bid   Afebrile, leucocytosis down trending - 14 today   Heme onc team following - she has hx of thrombocytosis & anemia likely secondary to chemotherapy   On eliquis home dose for hx of femoral DVT - 03/2021\"     On 6/20 the patient's care was transitioned to the hospitalist service    Medications: Allergies: Allergies   Allergen Reactions    Ceftriaxone      Had a rash on ceftriaxone December 2020, [place]       Current Meds:   Scheduled Meds:    sertraline  100 mg Oral Daily    morphine  15 mg Oral 2 times per day    levETIRAcetam  1,000 mg Oral BID    apixaban  5 mg Oral Daily    iron sucrose  300 mg Intravenous Q24H    sodium chloride flush  5-40 mL Intravenous 2 times per day    sodium chloride flush  5-40 mL Intravenous 2 times per day    aspirin  324 mg Oral Once     Continuous Infusions:    sodium chloride      propofol Stopped (06/19/21 0839)    sodium chloride      sodium chloride      lactated ringers 100 mL/hr at 06/18/21 1252    midazolam Stopped (06/19/21 0839)     PRN Meds: sodium chloride, oxyCODONE-acetaminophen, sodium chloride flush, sodium chloride, acetaminophen, sodium chloride flush, sodium chloride, ondansetron **OR** ondansetron, polyethylene glycol, acetaminophen **OR** acetaminophen, fentanNYL    Data:     Past Medical History:   has a past medical history of Anemia, Bleeding, Cervical cancer ([facility] [address] Depression, Diabetes mellitus ([facility] [address] GERD (gastroesophageal reflux disease), Hx of blood clots, Hypertension, Metastatic cancer ([facility] [address] Ovarian cancer ([facility] [address] Post chemo evaluation, and Splenic lesion. Social History:   reports that she has been smoking cigarettes. She has been smoking about 1.00 pack per day. She uses smokeless tobacco. She reports previous alcohol use. She reports that she does not use drugs.      Family History:   Family History   Problem Relation Age of Onset    Alcohol Abuse Mother     Cirrhosis Mother        Review of Systems:     Review of Systems   Constitutional: Positive for activity change (Decreased), appetite change (Diminished) and fatigue (Poor exercise capacity). Negative for chills, diaphoresis and fever. HENT: Negative for congestion and nosebleeds. Eyes: Negative for photophobia and visual disturbance. Respiratory: Negative for cough, shortness of breath and wheezing. Cardiovascular: Negative for chest pain and palpitations. Gastrointestinal: Positive for constipation. Negative for abdominal distention, blood in stool, nausea and vomiting. Genitourinary: Negative for flank pain and hematuria. Musculoskeletal: Negative for arthralgias and myalgias. Skin: Negative for rash and wound. Neurological: Positive for seizures (No further seizure activity) and weakness (Generalized). Negative for dizziness and light-headedness. Physical Examination:        Physical Exam  Vitals reviewed. Constitutional:       General: She is not in acute distress. Appearance: She is ill-appearing. She is not diaphoretic. HENT:      Head: Normocephalic. Nose: Nose normal.   Eyes:      General: No scleral icterus. Conjunctiva/sclera: Conjunctivae normal.   Neck:      Trachea: No tracheal deviation. Cardiovascular:      Rate and Rhythm: Normal rate and regular rhythm. Pulmonary:      Effort: Pulmonary effort is normal. No respiratory distress. Breath sounds: Normal breath sounds. No wheezing or rales. Chest:      Chest wall: No tenderness. Abdominal:      General: Bowel sounds are normal. There is no distension. Palpations: Abdomen is soft. Tenderness: There is no abdominal tenderness. Musculoskeletal:         General: No tenderness. Cervical back: Neck supple. Skin:     General: Skin is warm and dry. Neurological:      General: No focal deficit present.       Comments: Having some 06/20/21  0353   PROT  --  6.7 5.6* 5.5*   LABALBU  --  4.0 3.1* 3.1*   AST  --  12 19 14   ALT  --  5 5 5   ALKPHOS  --  64 55 52   BILITOT  --  <0.10* 0.16* 0.21*   POCGLU 159*  --   --   --      ABG:  Lab Results   Component Value Date    POCPH 7.426 06/19/2021    POCPCO2 42.5 06/19/2021    POCPO2 98.7 06/19/2021    POCHCO3 28.0 06/19/2021    NBEA NOT REPORTED 06/19/2021    PBEA 3 06/19/2021    ZWR8CYD NOT REPORTED 06/19/2021    CDJD6IMH 98 06/19/2021    FIO2 30.0 06/19/2021     Lab Results   Component Value Date/Time    SPECIAL NOT REPORTED 03/10/2021 07:16 AM     Lab Results   Component Value Date/Time    CULTURE NO SIGNIFICANT GROWTH 03/10/2021 07:16 AM       Radiology:  CT Head WO Contrast    Result Date: 6/18/2021  No acute intracranial process identified. The findings were sent to the Radiology Results [id number] at 9:41 am on 6/18/2021to be communicated to the Stroke Neurology service. XR CHEST PORTABLE    Result Date: 6/19/2021  No significant change in chest findings. XR CHEST PORTABLE    Result Date: 6/18/2021  Support tubes satisfactory. Similar enlarged susan (known adenopathy), scattered parenchymal densities (known pulmonary metastases) without significant interval change; probable smaller left pleural effusion. CTA HEAD NECK W CONTRAST    Result Date: 6/18/2021  1. No large vessel occlusion, significant stenosis or cerebral aneurysm identified. 2. No significant arterial stenosis identified within the neck. 3. Several foci of gas lucency within the lower left neck. Correlation with any history of attempted left central venous catheter placement is recommended. MRI LIMITED BRAIN    Result Date: 6/18/2021  Mild chronic white matter changes mild volume loss.  No evidence for restricted diffusion to suggest acute infarct         Assessment:        Principal Problem:    Status epilepticus ([address]  Active Problems:    Malignant neoplasm of ovary (HCC)    Type 2 diabetes mellitus

## 2021-06-20 NOTE — PROGRESS NOTES
TODAY:  6/20/2021     AWAKE & FOLLOWING COMMANDS:                   [] No               [x] Yes     INTUBATED:              [x] No               [] Yes     SEDATION/ANALGESIA:        [] Propofol gtt             [] Versed gtt               [] Ativan gtt                [x] No Sedation    Pain medications: ms contin, percocet,      FEEDING: Able to take PO?  [] No:  [] NPO for:         yes general diet        DVT Prophylaxis:  [x] Yes:   eliquis [] No rationale:      Stress Ulcer Prophylaxis: [] Yes:              [x] Not indicated diet     VASOPRESSORS:    [x] No                           [] Yes     CENTRAL/ARTERIAL LINES:  [x] No               BROWNLEE CATHETER: [x] No                    DRAINS: [x] No          [] Yes:       Head of Bed: [x] Elevated:          [] Flat     Glucose management: [x] Not indicated, consistently less than 180

## 2021-06-21 ENCOUNTER — APPOINTMENT (OUTPATIENT)
Dept: GENERAL RADIOLOGY | Age: 56
DRG: 100 | End: 2021-06-21
Payer: COMMERCIAL

## 2021-06-21 LAB
-: NORMAL
ABSOLUTE EOS #: 0.89 K/UL (ref 0–0.44)
ABSOLUTE IMMATURE GRANULOCYTE: 0.15 K/UL (ref 0–0.3)
ABSOLUTE LYMPH #: 1.63 K/UL (ref 1.1–3.7)
ABSOLUTE MONO #: 1.92 K/UL (ref 0.1–1.2)
ALBUMIN SERPL-MCNC: 3 G/DL (ref 3.5–5.2)
ALBUMIN/GLOBULIN RATIO: 1.2 (ref 1–2.5)
ALP BLD-CCNC: 50 U/L (ref 35–104)
ALT SERPL-CCNC: <5 U/L (ref 5–33)
AMORPHOUS: NORMAL
ANION GAP SERPL CALCULATED.3IONS-SCNC: 7 MMOL/L (ref 9–17)
AST SERPL-CCNC: 10 U/L
BACTERIA: NORMAL
BASOPHILS # BLD: 1 % (ref 0–2)
BASOPHILS ABSOLUTE: 0.15 K/UL (ref 0–0.2)
BILIRUB SERPL-MCNC: <0.1 MG/DL (ref 0.3–1.2)
BILIRUBIN URINE: NEGATIVE
BUN BLDV-MCNC: 4 MG/DL (ref 6–20)
BUN/CREAT BLD: ABNORMAL (ref 9–20)
CALCIUM SERPL-MCNC: 8.4 MG/DL (ref 8.6–10.4)
CASTS UA: NORMAL /LPF (ref 0–8)
CHLORIDE BLD-SCNC: 106 MMOL/L (ref 98–107)
CO2: 27 MMOL/L (ref 20–31)
COLOR: YELLOW
CREAT SERPL-MCNC: 0.26 MG/DL (ref 0.5–0.9)
CRYSTALS, UA: NORMAL /HPF
DIFFERENTIAL TYPE: ABNORMAL
EOSINOPHILS RELATIVE PERCENT: 6 % (ref 1–4)
EPITHELIAL CELLS UA: NORMAL /HPF (ref 0–5)
GFR AFRICAN AMERICAN: >60 ML/MIN
GFR NON-AFRICAN AMERICAN: >60 ML/MIN
GFR SERPL CREATININE-BSD FRML MDRD: ABNORMAL ML/MIN/{1.73_M2}
GFR SERPL CREATININE-BSD FRML MDRD: ABNORMAL ML/MIN/{1.73_M2}
GLUCOSE BLD-MCNC: 84 MG/DL (ref 70–99)
GLUCOSE URINE: NEGATIVE
HCT VFR BLD CALC: 29.9 % (ref 36.3–47.1)
HEMOGLOBIN: 8 G/DL (ref 11.9–15.1)
IMMATURE GRANULOCYTES: 1 %
KETONES, URINE: NEGATIVE
LEUKOCYTE ESTERASE, URINE: NEGATIVE
LYMPHOCYTES # BLD: 11 % (ref 24–43)
MAGNESIUM: 2.2 MG/DL (ref 1.6–2.6)
MCH RBC QN AUTO: 22.2 PG (ref 25.2–33.5)
MCHC RBC AUTO-ENTMCNC: 26.8 G/DL (ref 28.4–34.8)
MCV RBC AUTO: 82.8 FL (ref 82.6–102.9)
MONOCYTES # BLD: 13 % (ref 3–12)
MORPHOLOGY: ABNORMAL
MUCUS: NORMAL
NITRITE, URINE: NEGATIVE
NRBC AUTOMATED: 0 PER 100 WBC
OTHER OBSERVATIONS UA: NORMAL
PATHOLOGIST REVIEW: NORMAL
PDW BLD-RTO: 25.6 % (ref 11.8–14.4)
PH UA: 8 (ref 5–8)
PHOSPHORUS: 2.7 MG/DL (ref 2.6–4.5)
PLATELET # BLD: ABNORMAL K/UL (ref 138–453)
PLATELET ESTIMATE: ABNORMAL
PLATELET, FLUORESCENCE: 1172 K/UL (ref 138–453)
PLATELET, IMMATURE FRACTION: 1.3 % (ref 1.1–10.3)
PMV BLD AUTO: ABNORMAL FL (ref 8.1–13.5)
POTASSIUM SERPL-SCNC: 3.7 MMOL/L (ref 3.7–5.3)
PROTEIN UA: NEGATIVE
RBC # BLD: 3.61 M/UL (ref 3.95–5.11)
RBC # BLD: ABNORMAL 10*6/UL
RBC UA: NORMAL /HPF (ref 0–4)
RENAL EPITHELIAL, UA: NORMAL /HPF
SEG NEUTROPHILS: 68 % (ref 36–65)
SEGMENTED NEUTROPHILS ABSOLUTE COUNT: 10.06 K/UL (ref 1.5–8.1)
SODIUM BLD-SCNC: 140 MMOL/L (ref 135–144)
SPECIFIC GRAVITY UA: 1.01 (ref 1–1.03)
SURGICAL PATHOLOGY REPORT: NORMAL
TOTAL PROTEIN: 5.6 G/DL (ref 6.4–8.3)
TRICHOMONAS: NORMAL
TURBIDITY: CLEAR
URINE HGB: NEGATIVE
UROBILINOGEN, URINE: NORMAL
WBC # BLD: 14.8 K/UL (ref 3.5–11.3)
WBC # BLD: ABNORMAL 10*3/UL
WBC UA: NORMAL /HPF (ref 0–5)
YEAST: NORMAL

## 2021-06-21 PROCEDURE — 87040 BLOOD CULTURE FOR BACTERIA: CPT

## 2021-06-21 PROCEDURE — 94761 N-INVAS EAR/PLS OXIMETRY MLT: CPT

## 2021-06-21 PROCEDURE — 85025 COMPLETE CBC W/AUTO DIFF WBC: CPT

## 2021-06-21 PROCEDURE — 81001 URINALYSIS AUTO W/SCOPE: CPT

## 2021-06-21 PROCEDURE — 99254 IP/OBS CNSLTJ NEW/EST MOD 60: CPT | Performed by: PSYCHIATRY & NEUROLOGY

## 2021-06-21 PROCEDURE — 80053 COMPREHEN METABOLIC PANEL: CPT

## 2021-06-21 PROCEDURE — 2580000003 HC RX 258: Performed by: STUDENT IN AN ORGANIZED HEALTH CARE EDUCATION/TRAINING PROGRAM

## 2021-06-21 PROCEDURE — 6370000000 HC RX 637 (ALT 250 FOR IP): Performed by: STUDENT IN AN ORGANIZED HEALTH CARE EDUCATION/TRAINING PROGRAM

## 2021-06-21 PROCEDURE — 99232 SBSQ HOSP IP/OBS MODERATE 35: CPT | Performed by: INTERNAL MEDICINE

## 2021-06-21 PROCEDURE — 71045 X-RAY EXAM CHEST 1 VIEW: CPT

## 2021-06-21 PROCEDURE — 93970 EXTREMITY STUDY: CPT

## 2021-06-21 PROCEDURE — 84100 ASSAY OF PHOSPHORUS: CPT

## 2021-06-21 PROCEDURE — 83735 ASSAY OF MAGNESIUM: CPT

## 2021-06-21 PROCEDURE — 99232 SBSQ HOSP IP/OBS MODERATE 35: CPT | Performed by: STUDENT IN AN ORGANIZED HEALTH CARE EDUCATION/TRAINING PROGRAM

## 2021-06-21 PROCEDURE — 2060000000 HC ICU INTERMEDIATE R&B

## 2021-06-21 PROCEDURE — 85055 RETICULATED PLATELET ASSAY: CPT

## 2021-06-21 PROCEDURE — 36415 COLL VENOUS BLD VENIPUNCTURE: CPT

## 2021-06-21 RX ORDER — LANOLIN ALCOHOL/MO/W.PET/CERES
325 CREAM (GRAM) TOPICAL
Status: DISCONTINUED | OUTPATIENT
Start: 2021-06-22 | End: 2021-06-24 | Stop reason: HOSPADM

## 2021-06-21 RX ORDER — AMOXICILLIN AND CLAVULANATE POTASSIUM 875; 125 MG/1; MG/1
1 TABLET, FILM COATED ORAL EVERY 12 HOURS SCHEDULED
Status: DISCONTINUED | OUTPATIENT
Start: 2021-06-21 | End: 2021-06-24 | Stop reason: HOSPADM

## 2021-06-21 RX ORDER — MECLIZINE HCL 12.5 MG/1
12.5 TABLET ORAL 3 TIMES DAILY PRN
Status: DISCONTINUED | OUTPATIENT
Start: 2021-06-21 | End: 2021-06-24 | Stop reason: HOSPADM

## 2021-06-21 RX ADMIN — MORPHINE SULFATE 15 MG: 15 TABLET, EXTENDED RELEASE ORAL at 08:00

## 2021-06-21 RX ADMIN — MECLIZINE HYDROCHLORIDE 12.5 MG: 12.5 TABLET, FILM COATED ORAL at 15:53

## 2021-06-21 RX ADMIN — SODIUM CHLORIDE, PRESERVATIVE FREE 5 ML: 5 INJECTION INTRAVENOUS at 20:54

## 2021-06-21 RX ADMIN — APIXABAN 5 MG: 5 TABLET, FILM COATED ORAL at 08:00

## 2021-06-21 RX ADMIN — SERTRALINE 100 MG: 50 TABLET, FILM COATED ORAL at 08:00

## 2021-06-21 RX ADMIN — OXYCODONE HYDROCHLORIDE AND ACETAMINOPHEN 1 TABLET: 5; 325 TABLET ORAL at 15:56

## 2021-06-21 RX ADMIN — SODIUM CHLORIDE, PRESERVATIVE FREE 10 ML: 5 INJECTION INTRAVENOUS at 08:02

## 2021-06-21 RX ADMIN — SODIUM CHLORIDE, PRESERVATIVE FREE 10 ML: 5 INJECTION INTRAVENOUS at 09:10

## 2021-06-21 RX ADMIN — AMOXICILLIN AND CLAVULANATE POTASSIUM 1 TABLET: 875; 125 TABLET, FILM COATED ORAL at 22:32

## 2021-06-21 RX ADMIN — OXYCODONE HYDROCHLORIDE AND ACETAMINOPHEN 1 TABLET: 5; 325 TABLET ORAL at 11:43

## 2021-06-21 RX ADMIN — OXYCODONE HYDROCHLORIDE AND ACETAMINOPHEN 1 TABLET: 5; 325 TABLET ORAL at 03:09

## 2021-06-21 RX ADMIN — LEVETIRACETAM 1000 MG: 500 TABLET, FILM COATED ORAL at 20:50

## 2021-06-21 RX ADMIN — AMOXICILLIN AND CLAVULANATE POTASSIUM 1 TABLET: 875; 125 TABLET, FILM COATED ORAL at 10:28

## 2021-06-21 RX ADMIN — LEVETIRACETAM 1000 MG: 500 TABLET, FILM COATED ORAL at 08:00

## 2021-06-21 RX ADMIN — MORPHINE SULFATE 15 MG: 15 TABLET, EXTENDED RELEASE ORAL at 20:50

## 2021-06-21 ASSESSMENT — PAIN SCALES - GENERAL
PAINLEVEL_OUTOF10: 7
PAINLEVEL_OUTOF10: 6
PAINLEVEL_OUTOF10: 8

## 2021-06-21 ASSESSMENT — PAIN DESCRIPTION - LOCATION: LOCATION: HEAD

## 2021-06-21 ASSESSMENT — PAIN DESCRIPTION - DESCRIPTORS: DESCRIPTORS: HEADACHE

## 2021-06-21 NOTE — PROGRESS NOTES
Comprehensive Nutrition Assessment    Type and Reason for Visit:  Reassess    Nutrition Recommendations/Plan:   - Continue Regular Diet as tolerated  - Start high calorie oral nutrition supplement (Ensure Enlive) and provide 3x/d  - Monitor/encourage PO intakes     Nutrition Assessment:  Patient c/o loss of appetite and eating 1-25% of meals, per EHR. Date of last BM unknown. Malnutrition Assessment:  Malnutrition Status: At risk for malnutrition     Context:  Acute Illness     Findings of the 6 clinical characteristics of malnutrition:  Energy Intake:  Mild decrease in energy intake   Weight Loss:  No significant weight loss     Body Fat Loss:  No significant body fat loss     Muscle Mass Loss:  No significant muscle mass loss    Fluid Accumulation:  No significant fluid accumulation     Strength:  Not Performed    Estimated Daily Nutrient Needs:  Energy (kcal):  6950-1415 kcal/d (23-25 kcal/kg); Weight Used for Energy Requirements:  Current (73.9 kg)     Protein (g):  65-75 g protein/d (1.3-1.5 g/kg); Weight Used for Protein Requirements:  Ideal (73.9 kg)        Fluid (ml/day):  9474-2940 mL fluid/d or per MD; Method Used for Fluid Requirements:  ml/Kg (25-30 mL)      Nutrition Related Findings:  Labs reviewed. Meds: Venofer. Date of last BM unknown. Wounds:  None       Current Nutrition Therapies:    ADULT DIET; Regular    Anthropometric Measures:  · Height: 5' 2\" (157.5 cm)  · Current Body Weight: 162 lb (73.5 kg)   · Admission Body Weight: 165 lb 5.5 oz (75 kg)    · Usual Body Weight:  160 lbs x past 6 months, per EHR     · Ideal Body Weight: 110 lbs; % Ideal Body Weight 147.3 %   · BMI: 29.6  · Adjusted Body Weight:  No Adjustment   · BMI Categories: Overweight (BMI 25.0-29. 9)       Nutrition Diagnosis:   · Inadequate oral intake related to current medical condition as evidenced by intake 0-25%    Nutrition Interventions:   Food and/or Nutrient Delivery:  Continue Current Diet, Start Oral Nutrition Supplement  Nutrition Education/Counseling:  No recommendation at this time   Coordination of Nutrition Care:  Continue to monitor while inpatient    Goals:  PO intake to meet >75% of estimated nutrition needs       Nutrition Monitoring and Evaluation:   Behavioral-Environmental Outcomes:  None Identified   Food/Nutrient Intake Outcomes:  Food and Nutrient Intake, Supplement Intake  Physical Signs/Symptoms Outcomes:  Biochemical Data, Constipation, Nutrition Focused Physical Findings, Skin, Weight     Discharge Planning:     Too soon to determine     Electronically signed by Shreya Ferreira RD, LD on 6/21/21 at 12:11 PM EDT    Contact: [phone]

## 2021-06-21 NOTE — PROGRESS NOTES
[facility]  Office: [address], DO, Jose G Hector, DO, Juliadra Devi, DO, Renetta Covert Blood, DO, Shana Lopez MD, Wm Sweeney MD, Isiah Cleveland MD, Susanna Pittman MD, Collette Denmark, MD, Dmitri Don MD, Dipti Aviles MD, Cecil Espinosa MD, Isa Petersen, DO, Savanna Osman MD, Shireen Fuentes, DO, Summer Schwartz MD,  Jennifer Quevedo, DO, Mary Pablo MD, Eliott Spatz, MD, Angelita Leon MD, Margo Mitchell MD, Pieter Galvan, [facility], [facility] Zainab, CNP, Reginald Camarena, CNP, Dayana Mendoza, CNS, Cherie Moya, CNP, Casandra Hart, CNP, Meghna Marquez, CNP, Fay Lacy, CNP, Pk Winters, CNP, TESFAYE JarrettC, Rissa Nava, [facility], Duane Abed, CNP, Veronika Joiner, CNP, Azalia Contreras, CNP, [place], CNP, Amol Cutler, CNP, Juanda Flow, [address] [place] [address]    Progress Note    6/21/2021    9:11 AM    Name:   Jeronimo Mckeon  MRN:     [id number]     Acct:      [de-identified]   Room:   [address] [place] [address] Day:  3  Admit Date:  6/18/2021  9:17 AM    PCP:   Basilia Moore MD  Code Status:  Full Code    Subjective:     C/C:   Chief Complaint   Patient presents with    Cerebrovascular Accident     brought in per EMS      Interval History Status: not changed. Patient seen and examined sitting in bed attempting to eat breakfast this morning. Patient stating that she still has some dizziness when moving around. Blood pressure overall stable, no fevers overnight, saturating well on room air. Patient having persistent leukocytosis, hemoglobin stable around 8.0, platelets remain elevated 1172 K /microliter    Brief History:     52-year-old female past medical history of recurrent ovarian cancer originally diagnosed in 2005, thrombocytosis, depression, prior history of DVT in March 2021, iron deficiency anemia presented on 6/18/2021 due to altered mental status.   Patient does have a history of [place] ES syndrome blood clots, Hypertension, Metastatic cancer ([facility] [address] Ovarian cancer ([facility] [address] Post chemo evaluation, and Splenic lesion. Social History:   reports that she has been smoking cigarettes. She has been smoking about 1.00 pack per day. She uses smokeless tobacco. She reports previous alcohol use. She reports that she does not use drugs. Family History:   Family History   Problem Relation Age of Onset    Alcohol Abuse Mother     Cirrhosis Mother        Vitals:  BP (!) 148/65   Pulse 85   Temp 98.9 °F (37.2 °C) (Oral)   Resp 18   Ht 5' 2\" (1.575 m)   Wt 163 lb (73.9 kg)   SpO2 94%   BMI 29.81 kg/m²   Temp (24hrs), Av.6 °F (37 °C), Min:98.2 °F (36.8 °C), Max:100 °F (37.8 °C)    Recent Labs     21  0927   POCGLU 159*       I/O (24Hr):     Intake/Output Summary (Last 24 hours) at 2021 0911  Last data filed at 2021 0800  Gross per 24 hour   Intake 3960 ml   Output 1000 ml   Net 2960 ml       Labs:  Hematology:  Recent Labs     21  0928 21  0503 21  0353 21  0630 21  1742 21  0449   WBC 15.6* 19.8* 14.5*  --   --  14.8*   RBC 4.13 3.56* 3.27*  --   --  3.61*   HGB 8.9* 7.7* 6.8* 6.9* 8.0* 8.0*   HCT 34.3* 28.6* 26.7* 25.7* 28.7* 29.9*   MCV 83.1 80.3* 81.7*  --   --  82.8   MCH 21.5* 21.6* 20.8*  --   --  22.2*   MCHC 25.9* 26.9* 25.5*  --   --  26.8*   RDW 28.4* 27.7* 27.9*  --   --  25.6*   PLT See Reflexed IPF Result See Reflexed IPF Result See Reflexed IPF Result  --   --  See Reflexed IPF Result   MPV NOT REPORTED NOT REPORTED NOT REPORTED  --   --  NOT REPORTED   INR 0.9  --   --   --   --   --      Chemistry:  Recent Labs     21  0000 21  0503 21  1102 21  1523 21  2352 21  0353 21  0939 21  0449   NA  --  137  --   --   --  139  --  140   K  --  3.9  --   --   --  3.4*  --  3.7   CL  --  102  --   --   --  105  --  106   CO2  --  26  --   --   --  26  --  27   GLUCOSE  --  102*  --   --   --  82  --  84 BUN  --  4*  --   --   --  3*  --  4*   CREATININE  --  0.23*  --   --   --  0.23*  --  0.26*   MG  --  1.6  --   --   --  1.8  --  2.2   ANIONGAP  --  9  --   --   --  8*  --  7*   LABGLOM  --  >60  --   --   --  >60  --  >60   GFRAA  --  >60  --   --   --  >60  --  >60   CALCIUM  --  8.7  --   --   --  8.2*  --  8.4*   CAION 1.07*  --   --   --   --   --   --   --    PHOS  --  3.8  --   --   --  2.8  --  2.7   TROPHS 132*  --  115* 111*  --   --   --   --    LACTACIDWB  --   --  1.4 1.1 0.9 1.2 1.1  --      Recent Labs     06/18/21  0927 06/19/21  0503 06/20/21  0353 06/21/21  0449   PROT  --  5.6* 5.5* 5.6*   LABALBU  --  3.1* 3.1* 3.0*   AST  --  19 14 10   ALT  --  5 5 <5*   ALKPHOS  --  55 52 50   BILITOT  --  0.16* 0.21* <0.10*   POCGLU 159*  --   --   --      ABG:  Lab Results   Component Value Date    POCPH 7.426 06/19/2021    POCPCO2 42.5 06/19/2021    POCPO2 98.7 06/19/2021    POCHCO3 28.0 06/19/2021    NBEA NOT REPORTED 06/19/2021    PBEA 3 06/19/2021    BDT4ABY NOT REPORTED 06/19/2021    KFPV9DPV 98 06/19/2021    FIO2 30.0 06/19/2021     Lab Results   Component Value Date/Time    SPECIAL NOT REPORTED 03/10/2021 07:16 AM     Lab Results   Component Value Date/Time    CULTURE NO SIGNIFICANT GROWTH 03/10/2021 07:16 AM       Radiology:  CT Head WO Contrast    Result Date: 6/18/2021  No acute intracranial process identified. The findings were sent to the Radiology Results [id number] at 9:41 am on 6/18/2021to be communicated to the Stroke Neurology service. XR CHEST PORTABLE    Result Date: 6/19/2021  No significant change in chest findings. XR CHEST PORTABLE    Result Date: 6/18/2021  Support tubes satisfactory. Similar enlarged susan (known adenopathy), scattered parenchymal densities (known pulmonary metastases) without significant interval change; probable smaller left pleural effusion. CTA HEAD NECK W CONTRAST    Result Date: 6/18/2021  1.  No large vessel occlusion, significant stenosis or cerebral aneurysm identified. 2. No significant arterial stenosis identified within the neck. 3. Several foci of gas lucency within the lower left neck. Correlation with any history of attempted left central venous catheter placement is recommended. MRI LIMITED BRAIN    Result Date: 6/18/2021  Mild chronic white matter changes mild volume loss. No evidence for restricted diffusion to suggest acute infarct       Physical Examination:        General appearance:  Alert, chronically ill appearing  Mental Status:  oriented to person, place and time and normal affect  Lungs:  Decreased breath sounds bilaterally, crackles on left  Heart:  regular rate and rhythm, no murmur  Abdomen:  soft, nontender, nondistended, normal bowel sounds, no masses, hepatomegaly, splenomegaly  Extremities:  no edema, redness, tenderness in the calves  Skin:  no gross lesions, rashes, induration    Assessment:        Hospital Problems         Last Modified POA    * (Principal) Status epilepticus ([address] 6/20/2021 Yes    Malignant neoplasm of ovary ([address] 6/20/2021 Yes    Type 2 diabetes mellitus without complication, without long-term current use of insulin ([address] 6/20/2021 Yes    Hypochromic anemia 6/20/2021 Yes    PRES (posterior reversible encephalopathy syndrome) 6/20/2021 Yes    History of ovarian cancer 6/20/2021 Yes    Thrombocytosis ([address] 6/20/2021 Yes    History of deep venous thrombosis (DVT) of distal vein of left lower extremity: On Eliquis 6/20/2021 Yes          Plan:        1. Status epilepticus with multiple witnessed generalized tonic-clonic seizures. Appreciate neuro critical care recommendations. Keppra dose 1000 mg twice daily. 2. Persistent leukocytosis, chest x-ray showing some signs of worsening atelectasis in the left side versus developing pneumonia. Started on Augmentin. Follow-up chest x-ray tomorrow. Incentive spirometry  3.  History of ovarian cancer with thrombocytosis as well as iron deficiency anemia requiring 1 unit of red blood cell transfusion 6/20/2021. Appreciate oncology recommendations. Did receive a dose of Venofer. 4. Continue home dose of Eliquis for DVT  5. Antivert PRN for diziness  6. PT/OT    Met with patient's 2 daughters in person, son was available by phone. Discussed concerns for chest x-ray and why antibiotics were started. Family asked questions about seizure medications and levels, I answered honestly that I was not sure and that they would have to ask neurology for clarification.       Harrietta Goldberg, MD  6/21/2021  9:11 AM

## 2021-06-21 NOTE — CONSULTS
[facility] Neurology   IN-PATIENT SERVICE      NEUROLOGY CONSULT  NOTE            Date:   6/21/2021  Patient name:  Misbah Mcfarland [facility]  Date of admission:  6/18/2021  YOB: 1965      Chief Complaint:     Chief Complaint   Patient presents with    Cerebrovascular Accident     brought in per EMS        Reason for Consult:      Status epilepticus    History of Present Illness:      The patient is a 60 y.o. 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,   In route by EMS patient had an episode of seizure-like activity administered 2 mg of Versed, resolution of seizure-like activity, patient was on Eliquis for prophylaxis secondary to her high risk of venous thrombosis secondary to known cancer,, patient in the emergency department had 2 episodes of generalized tonic-clonic seizures which aborted by 2 mg of Ativan patient was loaded with 1 g of Keppra and she subsequently underwent an MRI of the negative CT and CTA head and neck, MRI was also negative for any infarct, patient was intubated then after stabilizing her case patient got extubated on 3 L of nasal cannula oxygen, hemoglobin dropped to 6.8 secondary to chemotherapy, heme-onc is following, 1 unit of red blood cells has been administered, patient currently on 1000 g of Keppra twice daily,    There is a troponin elevation downtrending levels, EKG was unremarkable, patient is asymptomatic, elevated levels secondary to seizures, patient stabilized and transferred out of the ICU, under internal medicine primary team    Past Medical History:     Past Medical History:   Diagnosis Date    Anemia     Bleeding 10/2020 intra-abdominal bleeding -due to splenic mass with GI infiltration. Status post embolization    C[facility] ([facility] [address]     Depression     Diabetes mellitus ([facility] [address]     GERD (gastroesophageal reflux disease)     Hx of blood clots     Hypertension     Metastatic cancer ([facility] [address] 10/2020    extensive intraabdominal and splenic involvement and lung mets.  Ovarian cancer ([facility] [address]     low grade serous ovarian carcinoma    Post chemo evaluation     2007: Chemo via med onc (Dr. Dalton Lewis), 2008: Verle [facility] due to rising CA-125, 2013: intraperitoneal chemo,12/2015: Ca125 - 25     Splenic lesion         Past Surgical History:     Past Surgical History:   Procedure Laterality Date    ABSCESS DRAINAGE  2013    Franca rectal    ANUS SURGERY      ANAL FISSURECTOMY    CARDIAC CATHETERIZATION      COLECTOMY  03/2013    ex lap, tumor debulking, transverse colectomy w reanastamosis, subgastric omentectomy, intraperitoneal port placement    HYSTERECTOMY, TOTAL ABDOMINAL      IR EMBOLIZATION HEMORRHAGE  10/05/2020    intra-abdominal bleeding -due to splenic mass with GI infiltration. Status post embolization [employer] interlock coils x7. mri condtional 3t ok, safe immediately post implant.  IR PORT PLACEMENT EQUAL OR GREATER THAN 5 YEARS  08/24/2020    IR PORT PLACEMENT EQUAL OR GREATER THAN 5 YEARS 8/24/2020 Brenda Tomas MD [facility]    PORT SURGERY      IP Port    TONSILLECTOMY          Medications Prior to Admission:     Prior to Admission medications    Medication Sig Start Date End Date Taking? Authorizing Provider   morphine (MS CONTIN) 15 MG extended release tablet Take 1 tablet by mouth 2 times daily for 30 days.  6/7/21 7/7/21  Kaila George MD   sertraline (ZOLOFT) 100 MG tablet Take 1 tablet by mouth daily 6/7/21 7/7/21  Niels Bhatia MD   metoprolol succinate (TOPROL XL) 25 MG extended release tablet Take 1 tablet by mouth daily 6/7/21   Kaila George MD   ELIQUIS 5 MG TABS --  14.8*   HGB 7.7* 6.8* 6.9* 8.0* 8.0*   PLT See Reflexed IPF Result See Reflexed IPF Result  --   --  See Reflexed IPF Result     BMP:    Recent Labs     06/19/21  0503 06/20/21  0353 06/21/21  0449    139 140   K 3.9 3.4* 3.7    105 106   CO2 26 26 27   BUN 4* 3* 4*   CREATININE 0.23* 0.23* 0.26*   GLUCOSE 102* 82 84         Lab Results   Component Value Date    CHOL 131 03/10/2021    LDLCHOLESTEROL 69 03/10/2021    HDL 33 (L) 03/10/2021    TRIG 147 03/10/2021    ALT <5 (L) 06/21/2021    AST 10 06/21/2021    TSH 2.93 03/09/2021    INR 0.9 06/18/2021    LABA1C 6.0 03/10/2021    OIEZARXW54 405 10/22/2020       No results found for: PHENYTOIN, PHENYTOIN, VALPROATE, CBMZ      Imaging/Diagnostics:  CT Head WO Contrast    Result Date: 6/18/2021  EXAMINATION: CT OF THE HEAD WITHOUT CONTRAST  6/18/2021 9:19 am TECHNIQUE: CT of the head was performed without the administration of intravenous contrast. Dose modulation, iterative reconstruction, and/or weight based adjustment of the mA/kV was utilized to reduce the radiation dose to as low as reasonably achievable. COMPARISON: 03/09/2021 HISTORY: ORDERING SYSTEM PROVIDED HISTORY: stroke TECHNOLOGIST PROVIDED HISTORY: stroke Decision Support Exception - unselect if not a suspected or confirmed emergency medical condition->Emergency Medical Condition (MA) FINDINGS: BRAIN/VENTRICLES: There is no acute infarct or acute intracranial hemorrhage present. There is no mass effect or midline shift present. There is no ventriculomegaly or abnormal extra-axial fluid collection present. ORBITS: Limited evaluation of the orbits is unremarkable. SINUSES: The paranasal sinuses and mastoid air cells are clear. SOFT TISSUES/SKULL:  No lytic or blastic osseous lesions are identified. No acute intracranial process identified. The findings were sent to the Radiology Results [address] at 9:41 am on 6/18/2021to be communicated to the Stroke Neurology service.      XR CHEST PORTABLE    Result Date: 6/21/2021  EXAMINATION: ONE XRAY VIEW OF THE CHEST 6/21/2021 10:50 am COMPARISON: Previous chest x-ray from 06/19/2021 HISTORY: ORDERING SYSTEM PROVIDED HISTORY: PNA rule out TECHNOLOGIST PROVIDED HISTORY: PNA rule out Cervical cancer, diabetes, GERD and hypertension. FINDINGS: Overlying ECG monitor leads and gown snaps. Stable right IJ chemo port with unchanged tip position. Coils splenic artery. Enlarged but unchanged appearing cardiac silhouette with a somewhat left ventricular configuration. Mediastinal structures appropriate for slight rotation to the left. Elevated right hemidiaphragm with perhaps medial right basilar atelectasis; additional bibasilar atelectatic appearing changes, greater left lateral base. Minimal infiltrate at the left base possible. .  Mild unchanged blunting left lateral costophrenic sulcus. No Kerley lines. Bones unchanged. Left basilar patchy opacities, probably atelectatic with a small pleural effusion. Developing infiltrate at the left base should be considered. Elevated right hemidiaphragm with probable right medial basilar atelectasis. Cardiomegaly. Otherwise stable findings. RECOMMENDATION: Short-term follow-up chest x-ray. XR CHEST PORTABLE    Result Date: 6/19/2021  EXAMINATION: ONE XRAY VIEW OF THE CHEST 6/19/2021 6:49 am COMPARISON: Chest June 18, 2021. HISTORY: ORDERING SYSTEM PROVIDED HISTORY: intubated TECHNOLOGIST PROVIDED HISTORY: intubated FINDINGS: Right-sided port is in place. ET enteric tubes are in satisfactory positions. Presumed coil embolization is seen involving the left upper quadrant. Heart and mediastinal structures appear unchanged. Left lower lobe atelectasis with small left pleural effusion is seen and appears unchanged. The right lung appears relatively clear. No pneumothorax or free air. No significant change in chest findings.      XR CHEST PORTABLE    Result Date: 6/18/2021  EXAMINATION: ONE XRAY VIEW OF THE CHEST 6/18/2021 10:22 am COMPARISON: AP chest from 03/09/2021; CT chest from 03/09/2021 HISTORY: ORDERING SYSTEM PROVIDED HISTORY: post intubation TECHNOLOGIST PROVIDED HISTORY: post intubation History of ovarian cancer and osseous metastases with previous splenic embolization. FINDINGS: ETT tip position satisfactory approximately 4 cm above the alejandro. Enteric tube tip and side hole project below left hemidiaphragm. Right IJ port in unchanged position, with good tip position in the upper right atrium. Coils splenic artery LUQ. Enlarged but stable appearing cardiac silhouette. Mediastinal structures appropriate for slight rotation to the left. Low lung volumes; mildly increased interstitial markings with scattered parenchymal densities and unchanged increased hilar shadows, L>R. Minimal unchanged blunting left costophrenic angle. No new pulmonary or right pleural abnormality. Scattered bony sclerotic foci, compatible with known Mets. No destructive lesion or obvious interval change. Support tubes satisfactory. Similar enlarged susan (known adenopathy), scattered parenchymal densities (known pulmonary metastases) without significant interval change; probable smaller left pleural effusion. CTA HEAD NECK W CONTRAST    Result Date: 6/18/2021  EXAMINATION: CTA OF THE HEAD AND NECK WITH CONTRAST 6/18/2021 9:19 am: TECHNIQUE: CTA of the head and neck was performed with the administration of intravenous contrast. Multiplanar reformatted images are provided for review. MIP images are provided for review. Stenosis of the internal carotid arteries measured using NASCET criteria. Dose modulation, iterative reconstruction, and/or weight based adjustment of the mA/kV was utilized to reduce the radiation dose to as low as reasonably achievable.  COMPARISON: CT brain earlier same day HISTORY: ORDERING SYSTEM PROVIDED HISTORY: stroke TECHNOLOGIST PROVIDED HISTORY: stroke Decision Support Exception - unselect if not a suspected or confirmed emergency medical condition->Emergency Medical Condition (MA) FINDINGS: CTA NECK: AORTIC ARCH/ARCH VESSELS: No dissection or arterial injury. No significant stenosis of the brachiocephalic or subclavian arteries. CAROTID ARTERIES: No dissection, arterial injury, or hemodynamically significant stenosis by NASCET criteria. VERTEBRAL ARTERIES: No dissection, arterial injury, or significant stenosis. SOFT TISSUES: There are multiple foci of soft tissue gas within the lower left neck, likely related to prior central venous line attempt. A right chest Port-A-Cath is present. The lung apices are clear. There is no pathologically enlarged lymphadenopathy or soft tissue mass identified. BONES: No lytic or blastic osseous lesions are identified. There is a posterior disc osteophyte complex at C5-6 resulting in mild spinal canal stenosis and mild-to-moderate bilateral neural foraminal narrowing. 3D surface reformations were performed and concur with the above findings. CTA HEAD: ANTERIOR CIRCULATION: The intracranial segments of the internal carotid arteries are normal.  There is no significant stenosis or aneurysm identified. The anterior and middle cerebral arteries are normal in appearance. No significant stenosis or aneurysm is identified. POSTERIOR CIRCULATION: The distal vertebral arteries are normal in appearance. The basilar artery is normal.  No significant stenosis or aneurysm is identified. The posterior cerebral arteries are normal in appearance. OTHER: No dural venous sinus thrombosis on this non-dedicated study. BRAIN: There is no mass effect or midline shift. There is no vascular malformation identified. 3D surface reformations were performed and concur with the above findings. 1. No large vessel occlusion, significant stenosis or cerebral aneurysm identified. 2. No significant arterial stenosis identified within the neck.  3. Several foci of gas lucency within the lower left neck. Correlation with any history of attempted left central venous catheter placement is recommended. MRI LIMITED BRAIN    Result Date: 6/18/2021  EXAMINATION: MRI OF THE BRAIN WITHOUT CONTRAST  6/18/2021 11:03 am TECHNIQUE: Multiplanar multisequence MRI of the brain was performed without the administration of intravenous contrast. COMPARISON: October 23, 2020 HISTORY: ORDERING SYSTEM PROVIDED HISTORY: acute right sided weakness, seizures, prior hx of PRES TECHNOLOGIST PROVIDED HISTORY: acute right sided weakness, seizures, prior hx of PRES Reason for Exam: cva, rt sided weakness FINDINGS: INTRACRANIAL STRUCTURES/VENTRICLES: There is no restricted diffusion to suggest acute infarct. No mass effect or midline shift. No evidence of an acute intracranial hemorrhage. The ventricles and sulci are normal in size and configuration. Scattered white matter changes in the subcortical white matter periventricular white matter present. There is no evidence for blood products on gradient imaging. The sellar/suprasellar regions appear unremarkable. The normal signal voids within the major intracranial vessels appear maintained. ORBITS: The visualized portion of the orbits demonstrate no acute abnormality. SINUSES: The visualized paranasal sinuses and mastoid air cells are well aerated. BONES/SOFT TISSUES: The bone marrow signal intensity appears normal. The soft tissues demonstrate no acute abnormality. Mild chronic white matter changes mild volume loss.  No evidence for restricted diffusion to suggest acute infarct       Impression:      49-year-old female with status epilepticus known history of ovarian cancer with mental status is recently diagnosed with press syndrome, severe anemia transfuse 1 unit of RBC,    Plan:     -LTM anything on 6/19: Diffuse theta slowing and previous of diffuse attenuation suggesting mild to moderate encephalopathy,   -CTA head and neck: No LVO,  -CT head: No acute

## 2021-06-21 NOTE — PROGRESS NOTES
Physical Therapy        Physical Therapy Cancel Note      DATE: 2021    NAME: Sole Henry  MRN: [id number]   : 1965      Patient not seen this date for Physical Therapy due to:    Testing: dopplers ordered this AM. PT will await results and check back this PM as time allows.        Electronically signed by Joaquin Watson PT on 2021 at 8:46 AM

## 2021-06-21 NOTE — PROGRESS NOTES
Today's Date: 6/21/2021  Patient Name: Sneha Buchanan  Date of admission: 6/18/2021  9:17 AM  Patient's age: 60 y.o., 1965  Admission Dx: Status epilepticus ([facility] [address] [G40.901]    Reason for Consult: management recommendations  Requesting Physician: Kylah Wilhelm MD    CHIEF COMPLAINT:      INTERIM HISTORY  Seen and evaluated,   She is alert and oriented, she received meclizine for dizziness. No fever or chills    HISTORY OF PRESENT ILLNESS:      The patient is a 60 y.o.  female who is admitted to the hospital for evaluation of unresponsiveness and concern regarding seizures. Patient admitted to the hospital due to concerns regarding seizures. Patient was found unresponsive by her son. Patient does have history of seizures has been on Keppra. Patient was also recently admitted to the neurology service for crest syndrome. Patient was noted to have a episode of seizure in route with ems. Patient also takes Eliquis for history of blood clots. In the ER patient was noted to have 2 more episodes of seizures. Patient MRI brain as well as CTA were unremarkable in regards to any acute event patient is currently being worked up by neurology for status epilepticus    Patient is known to our service. She has history of recurrent ovarian cancer. Original diagnosis was in 2005. Patient has been treated with Doxil Avastin in the past.  More recently she is being treated with gemcitabine patient last chemotherapy treatment was on 6/4/2021. Patient received cycle 5-day 8 of Gemzar. Lab work-up shows platelet count of 1.3  WBC count is 15 hemoglobin is 8.9. Recent iron studies show ferritin of 9. Patient is intubated at the time of evaluation and undergoing EEG.     Past Medical History:   has a past medical history of Anemia, Bleeding, Cervical cancer ([address] Depression, Diabetes mellitus ([address] GERD (gastroesophageal reflux disease), Hx of blood clots, Hypertension, Metastatic cancer [facility]), Ovarian cancer ([facility] [address] Post chemo evaluation, and Splenic lesion. Past Surgical History:   has a past surgical history that includes Hysterectomy, total abdominal; Port Surgery; Tonsillectomy; IR PORT PLACEMENT > 5 YEARS (08/24/2020); Anus surgery; Abscess Drainage (2013); colectomy (03/2013); IR EMBOLIZATION HEMORRHAGE (10/05/2020); and Cardiac catheterization. Medications:    Prior to Admission medications    Medication Sig Start Date End Date Taking? Authorizing Provider   morphine (MS CONTIN) 15 MG extended release tablet Take 1 tablet by mouth 2 times daily for 30 days.  6/7/21 7/7/21  Hong Paredes MD   sertraline (ZOLOFT) 100 MG tablet Take 1 tablet by mouth daily 6/7/21 7/7/21  Hong Paredes MD   metoprolol succinate (TOPROL XL) 25 MG extended release tablet Take 1 tablet by mouth daily 6/7/21   Hong Paredes MD   ELIQUIS 5 MG TABS tablet Take 1 tablet by mouth daily 5/26/21   Historical Provider, MD   pantoprazole (PROTONIX) 40 MG tablet Take 1 tablet by mouth 2 times daily 5/10/21   Hong Paredes MD   levETIRAcetam (KEPPRA) 750 MG tablet Take 2 tablets by mouth 2 times daily 5/3/21   Hong Paredes MD   ferrous sulfate (FE TABS 325) 325 (65 Fe) MG EC tablet Take 1 tablet by mouth daily (with breakfast) 3/12/21   ESTRELLITA Dwyer NP   metFORMIN (GLUCOPHAGE-XR) 500 MG extended release tablet Take 2 tablets by mouth twice daily 2/26/21   Hong Pareeds MD   aspirin 81 MG chewable tablet Take 81 mg by mouth nightly Pt not taking    Historical Provider, MD     Current Facility-Administered Medications   Medication Dose Route Frequency Provider Last Rate Last Admin    amoxicillin-clavulanate (AUGMENTIN) 875-125 MG per tablet 1 tablet  1 tablet Oral 2 times per day Jennifer Franz MD   1 tablet at 06/21/21 1028    [START ON 6/22/2021] ferrous sulfate (FE TABS 325) EC tablet 325 mg  325 mg Oral Daily with breakfast Jennifer Franz MD        meclizine (ANTIVERT) tablet 12.5 mg  12.5 mg Oral TID PRN Bill Fiore MD   12.5 mg at 06/21/21 1553    0.9 % sodium chloride infusion   Intravenous PRN Se Bullard MD        sertraline (ZOLOFT) tablet 100 mg  100 mg Oral Daily Hector Danny, DO   100 mg at 06/21/21 0800    morphine (MS CONTIN) extended release tablet 15 mg  15 mg Oral 2 times per day Hector Danny, DO   15 mg at 06/21/21 0800    levETIRAcetam (KEPPRA) tablet 1,000 mg  1,000 mg Oral BID Hector Danny, DO   1,000 mg at 06/21/21 0800    apixaban (ELIQUIS) tablet 5 mg  5 mg Oral Daily Hector Danny, DO   5 mg at 06/21/21 0800    oxyCODONE-acetaminophen (PERCOCET) 5-325 MG per tablet 1 tablet  1 tablet Oral Q4H PRN Hector Danny, DO   1 tablet at 06/21/21 1556    sodium chloride flush 0.9 % injection 5-40 mL  5-40 mL Intravenous 2 times per day Bere Hewitt MD   10 mL at 06/21/21 0910    sodium chloride flush 0.9 % injection 5-40 mL  5-40 mL Intravenous PRN Bere Hewitt MD        0.9 % sodium chloride infusion  25 mL Intravenous PRN Bere Hewitt MD        acetaminophen (TYLENOL) tablet 650 mg  650 mg Oral Q4H PRN Bere Hewitt MD        sodium chloride flush 0.9 % injection 5-40 mL  5-40 mL Intravenous 2 times per day Hector Danny, DO   10 mL at 06/21/21 0802    sodium chloride flush 0.9 % injection 5-40 mL  5-40 mL Intravenous PRN Hector Danny, DO        0.9 % sodium chloride infusion  25 mL Intravenous PRN Hector Danny, DO        ondansetron (ZOFRAN-ODT) disintegrating tablet 4 mg  4 mg Oral Q8H PRN Hector Danny, DO        Or    ondansetron (ZOFRAN) injection 4 mg  4 mg Intravenous Q6H PRN Hector Danny, DO   4 mg at 06/18/21 1414    polyethylene glycol (GLYCOLAX) packet 17 g  17 g Oral Daily PRN Hector Danny, DO        acetaminophen (TYLENOL) tablet 650 mg  650 mg Oral Q6H PRN Hector Delgado DO   650 mg at 06/20/21 1937    Or    acetaminophen (TYLENOL) suppository 650 mg  650 mg Rectal Q6H PRN iMsael Null DO        fentaNYL (SUBLIMAZE) injection 100 mcg  100 mcg Intravenous Q1H PRN Janay Heaton MD   100 mcg at 21 0551    aspirin chewable tablet 324 mg  324 mg Oral Once Nick RiveraDO           Allergies:  Ceftriaxone    Social History:   reports that she has been smoking cigarettes. She has been smoking about 1.00 pack per day. She uses smokeless tobacco. She reports previous alcohol use. She reports that she does not use drugs. Family History: family history includes Alcohol Abuse in her mother; Cirrhosis in her mother. REVIEW OF SYSTEMS:      Unable to obtain due to altered mental status  PHYSICAL EXAM:        BP (!) 119/103   Pulse 83   Temp 98.1 °F (36.7 °C) (Oral)   Resp 17   Ht 5' 2\" (1.575 m)   Wt 163 lb (73.9 kg)   SpO2 95%   BMI 29.81 kg/m²    Temp (24hrs), Av.4 °F (36.9 °C), Min:98.1 °F (36.7 °C), Max:98.9 °F (37.2 °C)      General appearance ill-appearing. Intubated. Mental status -intubated and sedated  Eyes - pupils equal and reactive, extraocular eye movements intact   Ears - bilateral TM's and external ear canals normal   Mouth -orally intubated  Neck - supple, no significant adenopathy   Lymphatics - no palpable lymphadenopathy, no hepatosplenomegaly   Chest - clear to auscultation, no wheezes, rales or rhonchi, symmetric air entry   Heart - normal rate, regular rhythm, normal S1, S2, no murmurs  Abdomen - soft, nontender, nondistended, no masses or organomegaly   Neurological -intubated and sedated  Musculoskeletal - no joint tenderness, deformity or swelling   Extremities - peripheral pulses normal, no pedal edema, no clubbing or cyanosis   Skin - normal coloration and turgor, no rashes, no suspicious skin lesions noted ,      DATA:      Labs:     Results for orders placed or performed during the hospital encounter of 21   COVID-19, Rapid    Specimen: Nasopharyngeal Swab   Result Value Ref Range    Specimen Description . NASOPHARYNGEAL SWAB SARS-CoV-2, Rapid Not Detected Not Detected   Culture, Blood 1    Specimen: Blood   Result Value Ref Range    Specimen Description . BLOOD     Special Requests R HAND 20ML     Culture NO GROWTH 7 HOURS    Culture, Blood 1    Specimen: Blood   Result Value Ref Range    Specimen Description . BLOOD     Special Requests L HAND 20ML     Culture NO GROWTH 7 HOURS    CBC Auto Differential   Result Value Ref Range    WBC 15.6 (H) 3.5 - 11.3 k/uL    RBC 4.13 3.95 - 5.11 m/uL    Hemoglobin 8.9 (L) 11.9 - 15.1 g/dL    Hematocrit 34.3 (L) 36.3 - 47.1 %    MCV 83.1 82.6 - 102.9 fL    MCH 21.5 (L) 25.2 - 33.5 pg    MCHC 25.9 (L) 28.4 - 34.8 g/dL    RDW 28.4 (H) 11.8 - 14.4 %    Platelets See Reflexed IPF Result 138 - 453 k/uL    MPV NOT REPORTED 8.1 - 13.5 fL    NRBC Automated 0.0 0.0 per 100 WBC    Differential Type NOT REPORTED     WBC Morphology NOT REPORTED     RBC Morphology NOT REPORTED     Platelet Estimate NOT REPORTED     Immature Granulocytes 0 0 %    Seg Neutrophils 55 36 - 66 %    Lymphocytes 27 24 - 44 %    Monocytes 11 (H) 1 - 7 %    Eosinophils % 6 (H) 1 - 4 %    Basophils 1 0 - 2 %    nRBC 1 (H) 0 per 100 WBC    Absolute Immature Granulocyte 0.00 0.00 - 0.30 k/uL    Segs Absolute 8.57 (H) 1.8 - 7.7 k/uL    Absolute Lymph # 4.21 1.0 - 4.8 k/uL    Absolute Mono # 1.72 (H) 0.1 - 0.8 k/uL    Absolute Eos # 0.94 (H) 0.0 - 0.4 k/uL    Basophils Absolute 0.16 0.0 - 0.2 k/uL    Morphology ANISOCYTOSIS PRESENT     Morphology HYPOCHROMIA PRESENT     Morphology 1+ POLYCHROMASIA     Morphology GIANT PLATELETS PRESENT    Comprehensive Metabolic Panel w/ Reflex to MG   Result Value Ref Range    Glucose 161 (H) 70 - 99 mg/dL    BUN 8 6 - 20 mg/dL    CREATININE 0.43 (L) 0.50 - 0.90 mg/dL    Bun/Cre Ratio NOT REPORTED 9 - 20    Calcium 8.7 8.6 - 10.4 mg/dL    Sodium 140 135 - 144 mmol/L    Potassium 3.9 3.7 - 5.3 mmol/L    Chloride 103 98 - 107 mmol/L    CO2 18 (L) 20 - 31 mmol/L    Anion Gap 19 (H) 9 - 17 mmol/L    Alkaline Phosphatase 64 35 - 104 U/L    ALT 5 5 - 33 U/L    AST 12 <32 U/L    Total Bilirubin <0.10 (L) 0.3 - 1.2 mg/dL    Total Protein 6.7 6.4 - 8.3 g/dL    Albumin 4.0 3.5 - 5.2 g/dL    Albumin/Globulin Ratio 1.5 1.0 - 2.5    GFR Non-African American >60 >60 mL/min    GFR African American >60 >60 mL/min    GFR Comment          GFR Staging NOT REPORTED    APTT   Result Value Ref Range    PTT 21.0 20.5 - 30.5 sec   Protime-INR   Result Value Ref Range    Protime 10.1 9.1 - 12.3 sec    INR 0.9    Troponin   Result Value Ref Range    Troponin, High Sensitivity 7 0 - 14 ng/L    Troponin T NOT REPORTED <0.03 ng/mL    Troponin Interp NOT REPORTED    Urinalysis Reflex to Culture    Specimen: Urine, clean catch   Result Value Ref Range    Color, UA YELLOW YELLOW    Turbidity UA CLEAR CLEAR    Glucose, Ur NEGATIVE NEGATIVE    Bilirubin Urine NEGATIVE NEGATIVE    Ketones, Urine TRACE (A) NEGATIVE    Specific Gravity, UA 1.035 (H) 1.005 - 1.030    Urine Hgb NEGATIVE NEGATIVE    pH, UA 5.0 5.0 - 8.0    Protein, UA TRACE (A) NEGATIVE    Urobilinogen, Urine Normal Normal    Nitrite, Urine NEGATIVE NEGATIVE    Leukocyte Esterase, Urine NEGATIVE NEGATIVE    Urinalysis Comments NOT REPORTED    Lactic Acid, Plasma   Result Value Ref Range    Lactic Acid NOT REPORTED mmol/L    Lactic Acid, Whole Blood 12.1 (H) 0.7 - 2.1 mmol/L   ELECTROLYTES PLUS   Result Value Ref Range    POC Sodium 143 138 - 146 mmol/L    POC Potassium 3.6 3.5 - 4.5 mmol/L    POC Chloride 108 (H) 98 - 107 mmol/L    POC TCO2 18 (L) 22 - 30 mmol/L    Anion Gap 18 (H) 7 - 16 mmol/L   Hemoglobin and hematocrit, blood   Result Value Ref Range    POC Hemoglobin 12.1 12.0 - 16.0 g/dL    POC Hematocrit 36 36 - 46 %   CALCIUM, IONIC (POC)   Result Value Ref Range    POC Ionized Calcium 1.14 (L) 1.15 - 1.33 mmol/L   Levetiracetam Level   Result Value Ref Range    Levetiracetam Lvl 23 ug/mL   Immature Platelet Fraction   Result Value Ref Range    Platelet, Immature Fraction 2.3 1.1 - 10.3 %    Platelet, Fluorescence 1,382 (HH) 138 - 453 k/uL   Troponin   Result Value Ref Range    Troponin, High Sensitivity 156 (HH) 0 - 14 ng/L    Troponin T NOT REPORTED <0.03 ng/mL    Troponin Interp NOT REPORTED    Microscopic Urinalysis   Result Value Ref Range    -          WBC, UA None 0 - 5 /HPF    RBC, UA 0 TO 2 0 - 4 /HPF    Casts UA NOT REPORTED 0 - 8 /LPF    Crystals, UA NOT REPORTED None /HPF    Epithelial Cells UA 0 TO 2 0 - 5 /HPF    Renal Epithelial, UA NOT REPORTED 0 /HPF    Bacteria, UA NOT REPORTED None    Mucus, UA NOT REPORTED None    Trichomonas, UA NOT REPORTED None    Amorphous, UA NOT REPORTED None    Other Observations UA NOT REPORTED NOT REQ.     Yeast, UA NOT REPORTED None   Procalcitonin   Result Value Ref Range    Procalcitonin 0.05 <0.09 ng/mL   Lactic Acid, Plasma   Result Value Ref Range    Lactic Acid NOT REPORTED mmol/L    Lactic Acid, Whole Blood 2.3 (H) 0.7 - 2.1 mmol/L   Lactic Acid, Plasma   Result Value Ref Range    Lactic Acid NOT REPORTED mmol/L    Lactic Acid, Whole Blood 1.4 0.7 - 2.1 mmol/L   Troponin   Result Value Ref Range    Troponin, High Sensitivity 166 (HH) 0 - 14 ng/L    Troponin T NOT REPORTED <0.03 ng/mL    Troponin Interp NOT REPORTED    Troponin   Result Value Ref Range    Troponin, High Sensitivity 142 (HH) 0 - 14 ng/L    Troponin T NOT REPORTED <0.03 ng/mL    Troponin Interp NOT REPORTED    Basic Metabolic Panel   Result Value Ref Range    Glucose 106 (H) 70 - 99 mg/dL    BUN 6 6 - 20 mg/dL    CREATININE 0.24 (L) 0.50 - 0.90 mg/dL    Bun/Cre Ratio NOT REPORTED 9 - 20    Calcium 8.0 (L) 8.6 - 10.4 mg/dL    Sodium 139 135 - 144 mmol/L    Potassium 3.4 (L) 3.7 - 5.3 mmol/L    Chloride 105 98 - 107 mmol/L    CO2 22 20 - 31 mmol/L    Anion Gap 12 9 - 17 mmol/L    GFR Non-African American >60 >60 mL/min    GFR African American >60 >60 mL/min    GFR Comment          GFR Staging NOT REPORTED    Troponin   Result Value Ref Range    Troponin, High Sensitivity 132 (HH) 0 - 14 ng/L    Troponin T NOT REPORTED <0.03 ng/mL    Troponin Interp NOT REPORTED    CALCIUM, IONIZED   Result Value Ref Range    Calcium, Ion 1.07 (L) 1.13 - 1.33 mmol/L   CBC auto differential   Result Value Ref Range    WBC 19.8 (H) 3.5 - 11.3 k/uL    RBC 3.56 (L) 3.95 - 5.11 m/uL    Hemoglobin 7.7 (L) 11.9 - 15.1 g/dL    Hematocrit 28.6 (L) 36.3 - 47.1 %    MCV 80.3 (L) 82.6 - 102.9 fL    MCH 21.6 (L) 25.2 - 33.5 pg    MCHC 26.9 (L) 28.4 - 34.8 g/dL    RDW 27.7 (H) 11.8 - 14.4 %    Platelets See Reflexed IPF Result 138 - 453 k/uL    MPV NOT REPORTED 8.1 - 13.5 fL    NRBC Automated 0.0 0.0 per 100 WBC    Differential Type NOT REPORTED     WBC Morphology NOT REPORTED     RBC Morphology NOT REPORTED     Platelet Estimate NOT REPORTED     Immature Granulocytes 0 0 %    Seg Neutrophils 82 (H) 36 - 66 %    Lymphocytes 9 (L) 24 - 44 %    Monocytes 9 (H) 1 - 7 %    Eosinophils % 0 (L) 1 - 4 %    Basophils 0 0 - 2 %    Absolute Immature Granulocyte 0.00 0.00 - 0.30 k/uL    Segs Absolute 16.24 (H) 1.8 - 7.7 k/uL    Absolute Lymph # 1.78 1.0 - 4.8 k/uL    Absolute Mono # 1.78 (H) 0.1 - 0.8 k/uL    Absolute Eos # 0.00 0.0 - 0.4 k/uL    Basophils Absolute 0.00 0.0 - 0.2 k/uL    Morphology ANISOCYTOSIS PRESENT     Morphology MICROCYTOSIS PRESENT     Morphology HYPOCHROMIA PRESENT     Morphology 1+ POLYCHROMASIA    Comprehensive Metabolic Panel   Result Value Ref Range    Glucose 102 (H) 70 - 99 mg/dL    BUN 4 (L) 6 - 20 mg/dL    CREATININE 0.23 (L) 0.50 - 0.90 mg/dL    Bun/Cre Ratio NOT REPORTED 9 - 20    Calcium 8.7 8.6 - 10.4 mg/dL    Sodium 137 135 - 144 mmol/L    Potassium 3.9 3.7 - 5.3 mmol/L    Chloride 102 98 - 107 mmol/L    CO2 26 20 - 31 mmol/L    Anion Gap 9 9 - 17 mmol/L    Alkaline Phosphatase 55 35 - 104 U/L    ALT 5 5 - 33 U/L    AST 19 <32 U/L    Total Bilirubin 0.16 (L) 0.3 - 1.2 mg/dL    Total Protein 5.6 (L) 6.4 - 8.3 g/dL    Albumin 3.1 (L) 3.5 - 5.2 g/dL    Albumin/Globulin Ratio 1.2 1.0 - 2.5    GFR Non-African American >60 >60 mL/min    GFR African American >60 >60 mL/min    GFR Comment          GFR Staging NOT REPORTED    Lactic Acid, Plasma   Result Value Ref Range    Lactic Acid NOT REPORTED mmol/L    Lactic Acid, Whole Blood 1.0 0.7 - 2.1 mmol/L   Lactic Acid, Plasma   Result Value Ref Range    Lactic Acid NOT REPORTED mmol/L    Lactic Acid, Whole Blood 1.4 0.7 - 2.1 mmol/L   Magnesium   Result Value Ref Range    Magnesium 1.6 1.6 - 2.6 mg/dL   Phosphorus   Result Value Ref Range    Phosphorus 3.8 2.6 - 4.5 mg/dL   Troponin   Result Value Ref Range    Troponin, High Sensitivity 115 (HH) 0 - 14 ng/L    Troponin T NOT REPORTED <0.03 ng/mL    Troponin Interp NOT REPORTED    Lactic Acid, Plasma   Result Value Ref Range    Lactic Acid NOT REPORTED mmol/L    Lactic Acid, Whole Blood 1.1 0.7 - 2.1 mmol/L   Troponin   Result Value Ref Range    Troponin, High Sensitivity 111 (HH) 0 - 14 ng/L    Troponin T NOT REPORTED <0.03 ng/mL    Troponin Interp NOT REPORTED    Immature Platelet Fraction   Result Value Ref Range    Platelet, Immature Fraction 1.7 1.1 - 10.3 %    Platelet, Fluorescence 1,108 (HH) 138 - 453 k/uL   Lactic Acid, Plasma   Result Value Ref Range    Lactic Acid NOT REPORTED mmol/L    Lactic Acid, Whole Blood 1.0 0.7 - 2.1 mmol/L   Procalcitonin   Result Value Ref Range    Procalcitonin 0.08 <0.09 ng/mL   Path Review, Smear   Result Value Ref Range    Pathologist Review SEE REPORT    Reticulocytes   Result Value Ref Range    Retic % 0.3 (L) 0.5 - 1.9 %    Absolute Retic # 0.010 (L) 0.030 - 0.080 M/uL    Immature Retic Fract 7.600 2.7 - 18.3 %    Retic Hemoglobin 16.3 (L) 28.2 - 35.7 pg   Lactic Acid, Plasma   Result Value Ref Range    Lactic Acid NOT REPORTED mmol/L    Lactic Acid, Whole Blood 0.9 0.7 - 2.1 mmol/L   Lactic Acid, Plasma   Result Value Ref Range    Lactic Acid NOT REPORTED mmol/L    Lactic Acid, Whole Blood 1.2 0.7 - 2.1 mmol/L   Ferritin   Result Value Ref Range    Ferritin 13 13 - 150 ug/L   CBC auto differential   Result Value Ref Range    WBC 14.5 (H) 3.5 - 11.3 k/uL    RBC 3.27 (L) 3.95 - 5.11 m/uL    Hemoglobin 6.8 (LL) 11.9 - 15.1 g/dL    Hematocrit 26.7 (L) 36.3 - 47.1 %    MCV 81.7 (L) 82.6 - 102.9 fL    MCH 20.8 (L) 25.2 - 33.5 pg    MCHC 25.5 (L) 28.4 - 34.8 g/dL    RDW 27.9 (H) 11.8 - 14.4 %    Platelets See Reflexed IPF Result 138 - 453 k/uL    MPV NOT REPORTED 8.1 - 13.5 fL    NRBC Automated 0.0 0.0 per 100 WBC    Differential Type NOT REPORTED     WBC Morphology NOT REPORTED     RBC Morphology NOT REPORTED     Platelet Estimate NOT REPORTED     Immature Granulocytes 0 0 %    Seg Neutrophils 85 (H) 36 - 66 %    Lymphocytes 4 (L) 24 - 44 %    Monocytes 10 (H) 1 - 7 %    Eosinophils % 0 (L) 1 - 4 %    Basophils 1 0 - 2 %    Absolute Immature Granulocyte 0.00 0.00 - 0.30 k/uL    Segs Absolute 12.32 (H) 1.8 - 7.7 k/uL    Absolute Lymph # 0.58 (L) 1.0 - 4.8 k/uL    Absolute Mono # 1.45 (H) 0.1 - 0.8 k/uL    Absolute Eos # 0.00 0.0 - 0.4 k/uL    Basophils Absolute 0.15 0.0 - 0.2 k/uL    Morphology MICROCYTOSIS PRESENT     Morphology ANISOCYTOSIS PRESENT     Morphology HYPOCHROMIA PRESENT     Morphology 1+ POLYCHROMASIA    Comprehensive Metabolic Panel   Result Value Ref Range    Glucose 82 70 - 99 mg/dL    BUN 3 (L) 6 - 20 mg/dL    CREATININE 0.23 (L) 0.50 - 0.90 mg/dL    Bun/Cre Ratio NOT REPORTED 9 - 20    Calcium 8.2 (L) 8.6 - 10.4 mg/dL    Sodium 139 135 - 144 mmol/L    Potassium 3.4 (L) 3.7 - 5.3 mmol/L    Chloride 105 98 - 107 mmol/L    CO2 26 20 - 31 mmol/L    Anion Gap 8 (L) 9 - 17 mmol/L    Alkaline Phosphatase 52 35 - 104 U/L    ALT 5 5 - 33 U/L    AST 14 <32 U/L    Total Bilirubin 0.21 (L) 0.3 - 1.2 mg/dL    Total Protein 5.5 (L) 6.4 - 8.3 g/dL    Albumin 3.1 (L) 3.5 - 5.2 g/dL    Albumin/Globulin Ratio 1.3 1.0 - 2.5    GFR Non-African American >60 >60 mL/min    GFR African American >60 >60 mL/min    GFR Comment          GFR Staging NOT REPORTED    Magnesium   Result Value Ref CREATININE 0.26 (L) 0.50 - 0.90 mg/dL    Bun/Cre Ratio NOT REPORTED 9 - 20    Calcium 8.4 (L) 8.6 - 10.4 mg/dL    Sodium 140 135 - 144 mmol/L    Potassium 3.7 3.7 - 5.3 mmol/L    Chloride 106 98 - 107 mmol/L    CO2 27 20 - 31 mmol/L    Anion Gap 7 (L) 9 - 17 mmol/L    Alkaline Phosphatase 50 35 - 104 U/L    ALT <5 (L) 5 - 33 U/L    AST 10 <32 U/L    Total Bilirubin <0.10 (L) 0.3 - 1.2 mg/dL    Total Protein 5.6 (L) 6.4 - 8.3 g/dL    Albumin 3.0 (L) 3.5 - 5.2 g/dL    Albumin/Globulin Ratio 1.2 1.0 - 2.5    GFR Non-African American >60 >60 mL/min    GFR African American >60 >60 mL/min    GFR Comment          GFR Staging NOT REPORTED    Magnesium   Result Value Ref Range    Magnesium 2.2 1.6 - 2.6 mg/dL   Phosphorus   Result Value Ref Range    Phosphorus 2.7 2.6 - 4.5 mg/dL   Immature Platelet Fraction   Result Value Ref Range    Platelet, Immature Fraction 1.3 1.1 - 10.3 %    Platelet, Fluorescence 1,172 (HH) 138 - 453 k/uL   Surgical Pathology   Result Value Ref Range    Surgical Pathology Report       [id number]  [address] [place] [address]. [place], [address]  [phone]  Fax: [phone]  SURGICAL PATHOLOGY CONSULTATION    Patient Name: Ирина Noguera  MR#: [id number]  Specimen #[id number]    Procedures/Addenda  PERIPHERAL BLOOD REPORT     Date Ordered:     6/21/2021     Status:  Signed Out       Date Complete:     6/21/2021     By: Tanja Lees M.D. Date Reported:     6/21/2021       INTERPRETATION  PERIPHERAL BLOOD:  SEVERE MICROCYTIC ANEMIA WITH IRON STUDIES SUGGESTIVE OF IRON  DEFICIENCY ANEMIA. SEVERE THROMBOCYTOSIS.   THROMBOCYTOSIS  DIFFERENTIAL IS BROAD AND INCLUDES ACUTE BLOOD LOSS, IRON DEFICIENCY  ANEMIA, INFECTION AND OTHER INFLAMMATORY STATES, TRAUMA, MYELOID  NEOPLASM, OTHER MALIGNANCIES, ETC.    RESULTS-COMMENTS  PERIPHERAL BLOOD STUDY    CBC: Please see the electronic health record for CBC parameters ([id number], 6/19/2021, 5:03). PLATELETS: Platelets  show normal morphology. LEUKOCYTES: White blood cells show normal morphology. There are no  blasts. ERYTHROCYTES: Red blood cells show microcytosis, hypochromia,  anisocytosis. Doc Baldwin M.D. Source:  1: PERIPHERAL BLOOD FOR REVIEW BY PATHOLOGIST     URINALYSIS WITH MICROSCOPIC   Result Value Ref Range    Color, UA YELLOW YELLOW    Turbidity UA CLEAR CLEAR    Glucose, Ur NEGATIVE NEGATIVE    Bilirubin Urine NEGATIVE NEGATIVE    Ketones, Urine NEGATIVE NEGATIVE    Specific Gravity, UA 1.012 1.005 - 1.030    Urine Hgb NEGATIVE NEGATIVE    pH, UA 8.0 5.0 - 8.0    Protein, UA NEGATIVE NEGATIVE    Urobilinogen, Urine Normal Normal    Nitrite, Urine NEGATIVE NEGATIVE    Leukocyte Esterase, Urine NEGATIVE NEGATIVE    -          WBC, UA None 0 - 5 /HPF    RBC, UA 0 TO 2 0 - 4 /HPF    Casts UA NOT REPORTED 0 - 8 /LPF    Crystals, UA NOT REPORTED None /HPF    Epithelial Cells UA None 0 - 5 /HPF    Renal Epithelial, UA NOT REPORTED 0 /HPF    Bacteria, UA NOT REPORTED None    Mucus, UA NOT REPORTED None    Trichomonas, UA NOT REPORTED None    Amorphous, UA NOT REPORTED None    Other Observations UA NOT REPORTED NOT REQ.     Yeast, UA NOT REPORTED None   Venous Blood Gas, POC   Result Value Ref Range    pH, Steffen 7.195 (LL) 7.320 - 7.430    pCO2, Steffen 42.9 41.0 - 51.0 mm Hg    pO2, Steffen 77.5 (H) 30 - 50 mm Hg    HCO3, Venous 16.6 (L) 22.0 - 29.0 mmol/L    Total CO2, Venous NOT REPORTED 23.0 - 30.0 mmol/L    Negative Base Excess, Steffen 11 (H) 0.0 - 2.0    Positive Base Excess, Steffen NOT REPORTED 0.0 - 3.0    O2 Sat, Steffen 92 (H) 60.0 - 85.0 %    O2 Device/Flow/% NOT REPORTED     Joe Test NOT REPORTED     Sample Site NOT REPORTED     Mode NOT REPORTED     FIO2 NOT REPORTED     Pt Temp NOT REPORTED     POC pH Temp NOT REPORTED     POC pCO2 Temp NOT REPORTED mm Hg    POC pO2 Temp NOT REPORTED mm Hg   Creatinine W/GFR Point of Care   Result Value Ref Range    POC Creatinine 0.58 0.51 - 1.19 mg/dL    GFR Comment >60 >60 mL/min    GFR Non-African American >60 >60 mL/min    GFR Comment         POCT urea (BUN)   Result Value Ref Range    POC BUN 8 8 - 26 mg/dL   Lactic Acid, POC   Result Value Ref Range    POC Lactic Acid 9.98 (H) 0.56 - 1.39 mmol/L   POCT Glucose   Result Value Ref Range    POC Glucose 159 (H) 74 - 100 mg/dL   Arterial Blood Gas, POC   Result Value Ref Range    POC pH 7.319 (L) 7.350 - 7.450    POC pCO2 42.5 35.0 - 48.0 mm Hg    POC PO2 173.9 (H) 83.0 - 108.0 mm Hg    POC HCO3 21.8 21.0 - 28.0 mmol/L    TCO2 (calc), Art NOT REPORTED 22.0 - 29.0 mmol/L    Negative Base Excess, Art 4 (H) 0.0 - 2.0    Positive Base Excess, Art NOT REPORTED 0.0 - 3.0    POC O2 SAT 99 (H) 94.0 - 98.0 %    O2 Device/Flow/% Adult Ventilator     Joe Test POSITIVE     Sample Site Left Radial Artery     Mode PRVC     FIO2 50.0     Pt Temp NOT REPORTED     POC pH Temp NOT REPORTED     POC pCO2 Temp NOT REPORTED mm Hg    POC pO2 Temp NOT REPORTED mm Hg   Arterial Blood Gas, POC   Result Value Ref Range    POC pH 7.426 7.350 - 7.450    POC pCO2 42.5 35.0 - 48.0 mm Hg    POC PO2 98.7 83.0 - 108.0 mm Hg    POC HCO3 28.0 21.0 - 28.0 mmol/L    TCO2 (calc), Art NOT REPORTED 22.0 - 29.0 mmol/L    Negative Base Excess, Art NOT REPORTED 0.0 - 2.0    Positive Base Excess, Art 3 0.0 - 3.0    POC O2 SAT 98 94.0 - 98.0 %    O2 Device/Flow/% Adult Ventilator     Joe Test POSITIVE     Sample Site Left Radial Artery     Mode NOT REPORTED     FIO2 30.0     Pt Temp NOT REPORTED     POC pH Temp NOT REPORTED     POC pCO2 Temp NOT REPORTED mm Hg    POC pO2 Temp NOT REPORTED mm Hg   EKG 12 Lead   Result Value Ref Range    Ventricular Rate 129 BPM    Atrial Rate 129 BPM    P-R Interval 148 ms    QRS Duration 88 ms    Q-T Interval 316 ms    QTc Calculation (Bazett) 462 ms    P Axis 74 degrees    R Axis 39 degrees    T Axis 92 degrees   EKG 12 Lead   Result Value Ref Range    Ventricular Rate 98 BPM    Atrial Rate 98 BPM    P-R Interval 166 ms    QRS Duration 82 ms    Q-T Interval 362 ms    QTc Calculation (Bazett) 462 ms    P Axis 60 degrees    R Axis 26 degrees    T Axis 74 degrees   TYPE AND SCREEN   Result Value Ref Range    Expiration Date 06/23/2021,2359     Arm Band Number [id number]     ABO/Rh A POSITIVE     Antibody Screen NOT TESTED     Antibody ID Anti-[place] Present  PREVIOUSLY IDENTIFIED       AMBER IgG NEGATIVE     Unit Number [id number]     Product Code Leukocyte Reduced Red Cell     Unit Divison 00     Dispense Status TRANSFUSED     Transfusion Status OK TO TRANSFUSE     Crossmatch Result COMPATIBLE     Unit Number [id number]     Product Code Leukocyte Reduced Red Cell     Unit Divison 00     Dispense Status ALLOCATED     Transfusion Status OK TO TRANSFUSE     Crossmatch Result COMPATIBLE    BLOOD BANK SPECIMEN   Result Value Ref Range    Blood Bank Specimen NOT REPORTED          IMAGING DATA:    CT Head WO Contrast    Result Date: 6/18/2021  EXAMINATION: CT OF THE HEAD WITHOUT CONTRAST  6/18/2021 9:19 am TECHNIQUE: CT of the head was performed without the administration of intravenous contrast. Dose modulation, iterative reconstruction, and/or weight based adjustment of the mA/kV was utilized to reduce the radiation dose to as low as reasonably achievable. COMPARISON: 03/09/2021 HISTORY: ORDERING SYSTEM PROVIDED HISTORY: stroke TECHNOLOGIST PROVIDED HISTORY: stroke Decision Support Exception - unselect if not a suspected or confirmed emergency medical condition->Emergency Medical Condition (MA) FINDINGS: BRAIN/VENTRICLES: There is no acute infarct or acute intracranial hemorrhage present. There is no mass effect or midline shift present. There is no ventriculomegaly or abnormal extra-axial fluid collection present. ORBITS: Limited evaluation of the orbits is unremarkable. SINUSES: The paranasal sinuses and mastoid air cells are clear.  SOFT TISSUES/SKULL: No lytic or blastic osseous lesions are identified. No acute intracranial process identified. The findings were sent to the Radiology Results [address] at 9:41 am on 6/18/2021to be communicated to the Stroke Neurology service. XR CHEST PORTABLE    Result Date: 6/18/2021  EXAMINATION: ONE XRAY VIEW OF THE CHEST 6/18/2021 10:22 am COMPARISON: AP chest from 03/09/2021; CT chest from 03/09/2021 HISTORY: ORDERING SYSTEM PROVIDED HISTORY: post intubation TECHNOLOGIST PROVIDED HISTORY: post intubation History of ovarian cancer and osseous metastases with previous splenic embolization. FINDINGS: ETT tip position satisfactory approximately 4 cm above the alejandro. Enteric tube tip and side hole project below left hemidiaphragm. Right IJ port in unchanged position, with good tip position in the upper right atrium. Coils splenic artery LUQ. Enlarged but stable appearing cardiac silhouette. Mediastinal structures appropriate for slight rotation to the left. Low lung volumes; mildly increased interstitial markings with scattered parenchymal densities and unchanged increased hilar shadows, L>R. Minimal unchanged blunting left costophrenic angle. No new pulmonary or right pleural abnormality. Scattered bony sclerotic foci, compatible with known Mets. No destructive lesion or obvious interval change. Support tubes satisfactory. Similar enlarged susan (known adenopathy), scattered parenchymal densities (known pulmonary metastases) without significant interval change; probable smaller left pleural effusion. CTA HEAD NECK W CONTRAST    Result Date: 6/18/2021  EXAMINATION: CTA OF THE HEAD AND NECK WITH CONTRAST 6/18/2021 9:19 am: TECHNIQUE: CTA of the head and neck was performed with the administration of intravenous contrast. Multiplanar reformatted images are provided for review. MIP images are provided for review. Stenosis of the internal carotid arteries measured using NASCET criteria.  Dose modulation, iterative reconstruction, and/or weight based adjustment of the mA/kV was utilized to reduce the radiation dose to as low as reasonably achievable. COMPARISON: CT brain earlier same day HISTORY: ORDERING SYSTEM PROVIDED HISTORY: stroke TECHNOLOGIST PROVIDED HISTORY: stroke Decision Support Exception - unselect if not a suspected or confirmed emergency medical condition->Emergency Medical Condition (MA) FINDINGS: CTA NECK: AORTIC ARCH/ARCH VESSELS: No dissection or arterial injury. No significant stenosis of the brachiocephalic or subclavian arteries. CAROTID ARTERIES: No dissection, arterial injury, or hemodynamically significant stenosis by NASCET criteria. VERTEBRAL ARTERIES: No dissection, arterial injury, or significant stenosis. SOFT TISSUES: There are multiple foci of soft tissue gas within the lower left neck, likely related to prior central venous line attempt. A right chest Port-A-Cath is present. The lung apices are clear. There is no pathologically enlarged lymphadenopathy or soft tissue mass identified. BONES: No lytic or blastic osseous lesions are identified. There is a posterior disc osteophyte complex at C5-6 resulting in mild spinal canal stenosis and mild-to-moderate bilateral neural foraminal narrowing. 3D surface reformations were performed and concur with the above findings. CTA HEAD: ANTERIOR CIRCULATION: The intracranial segments of the internal carotid arteries are normal.  There is no significant stenosis or aneurysm identified. The anterior and middle cerebral arteries are normal in appearance. No significant stenosis or aneurysm is identified. POSTERIOR CIRCULATION: The distal vertebral arteries are normal in appearance. The basilar artery is normal.  No significant stenosis or aneurysm is identified. The posterior cerebral arteries are normal in appearance. OTHER: No dural venous sinus thrombosis on this non-dedicated study.  BRAIN: There is no mass effect or midline shift.  There is no vascular malformation identified. 3D surface reformations were performed and concur with the above findings. 1. No large vessel occlusion, significant stenosis or cerebral aneurysm identified. 2. No significant arterial stenosis identified within the neck. 3. Several foci of gas lucency within the lower left neck. Correlation with any history of attempted left central venous catheter placement is recommended. MRI LIMITED BRAIN    Result Date: 6/18/2021  EXAMINATION: MRI OF THE BRAIN WITHOUT CONTRAST  6/18/2021 11:03 am TECHNIQUE: Multiplanar multisequence MRI of the brain was performed without the administration of intravenous contrast. COMPARISON: October 23, 2020 HISTORY: ORDERING SYSTEM PROVIDED HISTORY: acute right sided weakness, seizures, prior hx of PRES TECHNOLOGIST PROVIDED HISTORY: acute right sided weakness, seizures, prior hx of PRES Reason for Exam: cva, rt sided weakness FINDINGS: INTRACRANIAL STRUCTURES/VENTRICLES: There is no restricted diffusion to suggest acute infarct. No mass effect or midline shift. No evidence of an acute intracranial hemorrhage. The ventricles and sulci are normal in size and configuration. Scattered white matter changes in the subcortical white matter periventricular white matter present. There is no evidence for blood products on gradient imaging. The sellar/suprasellar regions appear unremarkable. The normal signal voids within the major intracranial vessels appear maintained. ORBITS: The visualized portion of the orbits demonstrate no acute abnormality. SINUSES: The visualized paranasal sinuses and mastoid air cells are well aerated. BONES/SOFT TISSUES: The bone marrow signal intensity appears normal. The soft tissues demonstrate no acute abnormality. Mild chronic white matter changes mild volume loss.  No evidence for restricted diffusion to suggest acute infarct         IMPRESSION:   Primary Problem  Status epilepticus ([address]    Active Hospital Problems    Diagnosis Date Noted    History of deep venous thrombosis (DVT) of distal vein of left lower extremity: On Eliquis [Z86.718] 06/20/2021    Thrombocytosis ([facility] [D47.3]     History of ovarian cancer [Z85.43]     Status epilepticus ([address] [G40.901] 01/04/2021    PRES (posterior reversible encephalopathy syndrome) [I67.83]     Hypochromic anemia [D50.9]     Type 2 diabetes mellitus without complication, without long-term current use of insulin ([facility] [E11.9]     Malignant neoplasm of ovary ([facility] [C56.9] 08/17/2020     Altered mental status  Recurrent ovarian cancer  Thrombocytosis  Iron deficiency      RECOMMENDATIONS:  1. I personally reviewed results of lab work-up imaging studies and other relevant clinical data. 2. Continue current care per neurology  3. Hemoglobin improved after transfusion  4. Leukocytosis is reactive and so is thrombocytosis  5. Hold chemotherapy until she recovers        Discussed with  Nurse. Thank you for asking us to see this patient. This note is created with the assistance of a speech recognition program.  While intending to generate a document that actually reflects the content of the visit, the document can still have some errors including those of syntax and sound a like substitutions which may escape proof reading. It such instances, actual meaning can be extrapolated by contextual diversion.

## 2021-06-22 ENCOUNTER — APPOINTMENT (OUTPATIENT)
Dept: GENERAL RADIOLOGY | Age: 56
DRG: 100 | End: 2021-06-22
Payer: COMMERCIAL

## 2021-06-22 PROBLEM — I82.512 CHRONIC DEEP VEIN THROMBOSIS (DVT) OF FEMORAL VEIN OF LEFT LOWER EXTREMITY (HCC): Status: ACTIVE | Noted: 2021-03-11

## 2021-06-22 LAB
ABSOLUTE EOS #: 0.78 K/UL (ref 0–0.44)
ABSOLUTE IMMATURE GRANULOCYTE: 0.16 K/UL (ref 0–0.3)
ABSOLUTE LYMPH #: 2.34 K/UL (ref 1.1–3.7)
ABSOLUTE MONO #: 1.87 K/UL (ref 0.1–1.2)
ALBUMIN SERPL-MCNC: 3.1 G/DL (ref 3.5–5.2)
ALBUMIN/GLOBULIN RATIO: 1.2 (ref 1–2.5)
ALP BLD-CCNC: 51 U/L (ref 35–104)
ALT SERPL-CCNC: 5 U/L (ref 5–33)
ANION GAP SERPL CALCULATED.3IONS-SCNC: 12 MMOL/L (ref 9–17)
AST SERPL-CCNC: 25 U/L
BASOPHILS # BLD: 1 % (ref 0–2)
BASOPHILS ABSOLUTE: 0.16 K/UL (ref 0–0.2)
BILIRUB SERPL-MCNC: <0.1 MG/DL (ref 0.3–1.2)
BUN BLDV-MCNC: 8 MG/DL (ref 6–20)
BUN/CREAT BLD: ABNORMAL (ref 9–20)
CALCIUM SERPL-MCNC: 8.5 MG/DL (ref 8.6–10.4)
CHLORIDE BLD-SCNC: 106 MMOL/L (ref 98–107)
CO2: 24 MMOL/L (ref 20–31)
CREAT SERPL-MCNC: 0.32 MG/DL (ref 0.5–0.9)
DIFFERENTIAL TYPE: ABNORMAL
EOSINOPHILS RELATIVE PERCENT: 5 % (ref 1–4)
GFR AFRICAN AMERICAN: >60 ML/MIN
GFR NON-AFRICAN AMERICAN: >60 ML/MIN
GFR SERPL CREATININE-BSD FRML MDRD: ABNORMAL ML/MIN/{1.73_M2}
GFR SERPL CREATININE-BSD FRML MDRD: ABNORMAL ML/MIN/{1.73_M2}
GLUCOSE BLD-MCNC: 90 MG/DL (ref 70–99)
HCT VFR BLD CALC: 29.7 % (ref 36.3–47.1)
HEMOGLOBIN: 7.9 G/DL (ref 11.9–15.1)
IMMATURE GRANULOCYTES: 1 %
LV EF: 55 %
LVEF MODALITY: NORMAL
LYMPHOCYTES # BLD: 15 % (ref 24–43)
MAGNESIUM: 1.8 MG/DL (ref 1.6–2.6)
MCH RBC QN AUTO: 21.9 PG (ref 25.2–33.5)
MCHC RBC AUTO-ENTMCNC: 26.6 G/DL (ref 28.4–34.8)
MCV RBC AUTO: 82.5 FL (ref 82.6–102.9)
MONOCYTES # BLD: 12 % (ref 3–12)
MORPHOLOGY: ABNORMAL
NRBC AUTOMATED: 0 PER 100 WBC
PDW BLD-RTO: 26.8 % (ref 11.8–14.4)
PHOSPHORUS: 3.6 MG/DL (ref 2.6–4.5)
PLATELET # BLD: ABNORMAL K/UL (ref 138–453)
PLATELET ESTIMATE: ABNORMAL
PLATELET, FLUORESCENCE: 1225 K/UL (ref 138–453)
PLATELET, IMMATURE FRACTION: 1.2 % (ref 1.1–10.3)
PMV BLD AUTO: ABNORMAL FL (ref 8.1–13.5)
POTASSIUM SERPL-SCNC: 4 MMOL/L (ref 3.7–5.3)
RBC # BLD: 3.6 M/UL (ref 3.95–5.11)
RBC # BLD: ABNORMAL 10*6/UL
SEG NEUTROPHILS: 66 % (ref 36–65)
SEGMENTED NEUTROPHILS ABSOLUTE COUNT: 10.29 K/UL (ref 1.5–8.1)
SODIUM BLD-SCNC: 142 MMOL/L (ref 135–144)
TOTAL PROTEIN: 5.7 G/DL (ref 6.4–8.3)
WBC # BLD: 15.6 K/UL (ref 3.5–11.3)
WBC # BLD: ABNORMAL 10*3/UL

## 2021-06-22 PROCEDURE — 99232 SBSQ HOSP IP/OBS MODERATE 35: CPT | Performed by: PSYCHIATRY & NEUROLOGY

## 2021-06-22 PROCEDURE — 36415 COLL VENOUS BLD VENIPUNCTURE: CPT

## 2021-06-22 PROCEDURE — 71045 X-RAY EXAM CHEST 1 VIEW: CPT

## 2021-06-22 PROCEDURE — 2580000003 HC RX 258: Performed by: STUDENT IN AN ORGANIZED HEALTH CARE EDUCATION/TRAINING PROGRAM

## 2021-06-22 PROCEDURE — 84100 ASSAY OF PHOSPHORUS: CPT

## 2021-06-22 PROCEDURE — 99223 1ST HOSP IP/OBS HIGH 75: CPT | Performed by: FAMILY MEDICINE

## 2021-06-22 PROCEDURE — 83735 ASSAY OF MAGNESIUM: CPT

## 2021-06-22 PROCEDURE — 85025 COMPLETE CBC W/AUTO DIFF WBC: CPT

## 2021-06-22 PROCEDURE — 80053 COMPREHEN METABOLIC PANEL: CPT

## 2021-06-22 PROCEDURE — 6370000000 HC RX 637 (ALT 250 FOR IP): Performed by: INTERNAL MEDICINE

## 2021-06-22 PROCEDURE — 93306 TTE W/DOPPLER COMPLETE: CPT

## 2021-06-22 PROCEDURE — 99232 SBSQ HOSP IP/OBS MODERATE 35: CPT | Performed by: INTERNAL MEDICINE

## 2021-06-22 PROCEDURE — 97162 PT EVAL MOD COMPLEX 30 MIN: CPT

## 2021-06-22 PROCEDURE — 97116 GAIT TRAINING THERAPY: CPT

## 2021-06-22 PROCEDURE — 6370000000 HC RX 637 (ALT 250 FOR IP): Performed by: STUDENT IN AN ORGANIZED HEALTH CARE EDUCATION/TRAINING PROGRAM

## 2021-06-22 PROCEDURE — 2580000003 HC RX 258: Performed by: INTERNAL MEDICINE

## 2021-06-22 PROCEDURE — 85055 RETICULATED PLATELET ASSAY: CPT

## 2021-06-22 PROCEDURE — 2060000000 HC ICU INTERMEDIATE R&B

## 2021-06-22 RX ORDER — 0.9 % SODIUM CHLORIDE 0.9 %
500 INTRAVENOUS SOLUTION INTRAVENOUS ONCE
Status: COMPLETED | OUTPATIENT
Start: 2021-06-22 | End: 2021-06-22

## 2021-06-22 RX ORDER — FAMOTIDINE 20 MG/1
20 TABLET, FILM COATED ORAL 2 TIMES DAILY
Status: DISCONTINUED | OUTPATIENT
Start: 2021-06-22 | End: 2021-06-24 | Stop reason: HOSPADM

## 2021-06-22 RX ADMIN — OXYCODONE HYDROCHLORIDE AND ACETAMINOPHEN 1 TABLET: 5; 325 TABLET ORAL at 07:01

## 2021-06-22 RX ADMIN — MECLIZINE HYDROCHLORIDE 12.5 MG: 12.5 TABLET, FILM COATED ORAL at 09:06

## 2021-06-22 RX ADMIN — OXYCODONE HYDROCHLORIDE AND ACETAMINOPHEN 1 TABLET: 5; 325 TABLET ORAL at 00:57

## 2021-06-22 RX ADMIN — FERROUS SULFATE TAB EC 325 MG (65 MG FE EQUIVALENT) 325 MG: 325 (65 FE) TABLET DELAYED RESPONSE at 09:06

## 2021-06-22 RX ADMIN — LEVETIRACETAM 1000 MG: 500 TABLET, FILM COATED ORAL at 09:06

## 2021-06-22 RX ADMIN — SODIUM CHLORIDE, PRESERVATIVE FREE 5 ML: 5 INJECTION INTRAVENOUS at 21:00

## 2021-06-22 RX ADMIN — MORPHINE SULFATE 15 MG: 15 TABLET, EXTENDED RELEASE ORAL at 21:42

## 2021-06-22 RX ADMIN — MORPHINE SULFATE 15 MG: 15 TABLET, EXTENDED RELEASE ORAL at 09:10

## 2021-06-22 RX ADMIN — MECLIZINE HYDROCHLORIDE 12.5 MG: 12.5 TABLET, FILM COATED ORAL at 00:24

## 2021-06-22 RX ADMIN — LEVETIRACETAM 1000 MG: 500 TABLET, FILM COATED ORAL at 21:41

## 2021-06-22 RX ADMIN — SODIUM CHLORIDE, PRESERVATIVE FREE 10 ML: 5 INJECTION INTRAVENOUS at 09:10

## 2021-06-22 RX ADMIN — OXYCODONE HYDROCHLORIDE AND ACETAMINOPHEN 1 TABLET: 5; 325 TABLET ORAL at 21:42

## 2021-06-22 RX ADMIN — AMOXICILLIN AND CLAVULANATE POTASSIUM 1 TABLET: 875; 125 TABLET, FILM COATED ORAL at 21:41

## 2021-06-22 RX ADMIN — AMOXICILLIN AND CLAVULANATE POTASSIUM 1 TABLET: 875; 125 TABLET, FILM COATED ORAL at 09:06

## 2021-06-22 RX ADMIN — OXYCODONE HYDROCHLORIDE AND ACETAMINOPHEN 1 TABLET: 5; 325 TABLET ORAL at 13:13

## 2021-06-22 RX ADMIN — SODIUM CHLORIDE 500 ML: 9 INJECTION, SOLUTION INTRAVENOUS at 11:31

## 2021-06-22 RX ADMIN — APIXABAN 5 MG: 5 TABLET, FILM COATED ORAL at 09:06

## 2021-06-22 RX ADMIN — OXYCODONE HYDROCHLORIDE AND ACETAMINOPHEN 1 TABLET: 5; 325 TABLET ORAL at 17:26

## 2021-06-22 RX ADMIN — FAMOTIDINE 20 MG: 20 TABLET, FILM COATED ORAL at 21:41

## 2021-06-22 RX ADMIN — MECLIZINE HYDROCHLORIDE 12.5 MG: 12.5 TABLET, FILM COATED ORAL at 21:42

## 2021-06-22 RX ADMIN — SERTRALINE 100 MG: 50 TABLET, FILM COATED ORAL at 09:06

## 2021-06-22 ASSESSMENT — PAIN SCALES - GENERAL
PAINLEVEL_OUTOF10: 7
PAINLEVEL_OUTOF10: 3
PAINLEVEL_OUTOF10: 8
PAINLEVEL_OUTOF10: 10
PAINLEVEL_OUTOF10: 8

## 2021-06-22 NOTE — CARE COORDINATION
Son at [facility] desk asking about help at home for his mother. He informs me that he is not willing to send her to a facility. We discussed what HC can can provide. I informed him that if [facility] does not provide aide services that those can be paid for out of pocket. He is willing to pay out of pocket for Aides and palliative. Provided list of [facility]. agencies. He states he will look over the list and get back with me.

## 2021-06-22 NOTE — DISCHARGE INSTR - COC
Continuity of Care Form    Patient Name: Jeronimo Mckeon   :  1965  MRN:  [id number]    Admit date:  2021  Discharge date:  21    Code Status Order: Full Code   Advance Directives:      Admitting Physician:  Summer Schwartz MD  PCP: Kimmie Sanders MD    Discharging Nurse: Cristy Silvestre RN    [address] Unit/Room#: 2329/5555-99  Discharging Unit Phone Number: [phone]    Emergency Contact:   Extended Emergency Contact Information  Primary Emergency Contact: Yovani Garcia (son hipaa)  Address: [address] [facility] [address] [facility], [address] [place] [address]  Home Phone: [phone]  Relation: Child  Secondary Emergency Contact: Miriam Arredondo hipaa  Address: n/a  Home Phone: [phone]  Work Phone: [phone]  Mobile Phone: [phone]  Relation: Child    Past Surgical History:  Past Surgical History:   Procedure Laterality Date    ABSCESS DRAINAGE      Franca rectal    ANUS SURGERY      ANAL FISSURECTOMY    CARDIAC CATHETERIZATION      COLECTOMY  2013    ex lap, tumor debulking, transverse colectomy w reanastamosis, subgastric omentectomy, intraperitoneal port placement    HYSTERECTOMY, TOTAL ABDOMINAL      IR EMBOLIZATION HEMORRHAGE  10/05/2020    intra-abdominal bleeding -due to splenic mass with GI infiltration. Status post embolization [employer] interlock coils x7. mri condtional 3t ok, safe immediately post implant.     IR PORT PLACEMENT EQUAL OR GREATER THAN 5 YEARS  2020    IR PORT PLACEMENT EQUAL OR GREATER THAN 5 YEARS 2020 Bryan Akers MD [facility]ED[facility]    PORT SURGERY      IP Port    TONSILLECTOMY         Immunization History:   Immunization History   Administered Date(s) Administered    Influenza, Quadv, IM, PF (6 mo and older Fluzone, Flulaval, Fluarix, and 3 yrs and older Afluria) 10/21/2020    Pneumococcal Conjugate 13-valent ([id number]) 10/21/2020    Pneumococcal Polysaccharide ([id number]) 2010, 2020       Active Problems:  Patient Active Problem List   Diagnosis Code    Malignant neoplasm of ovary (HCC) C56.9    Seizure ([facility] R56.9    Type 2 diabetes mellitus without complication, without long-term current use of insulin (HCC) E11.9    Hypochromic anemia D50.9    Splenic abscess D73.3    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) C79.60    Acute encephalopathy G93.40    PRES (posterior reversible encephalopathy syndrome) I67.83    Hemianopia, bitemporal H53.47    Encephalopathy G93.40    Status epilepticus (HCC) G40.901    History of ovarian cancer Z85.43    Pleural effusion J90    Bandemia D72.825    Cancer, metastatic to bone (HCC) C79.51    Intractable low back pain M54.5    Fatigue R53.83    Chronic deep vein thrombosis (DVT) of femoral vein of left lower extremity (HCC) I82.512    Iron deficiency anemia secondary to inadequate dietary iron intake D50.8    Iron malabsorption K90.9    Ovarian cancer (HCC) C56.9    Thrombocytosis (HCC) D47.3    History of deep venous thrombosis (DVT) of distal vein of left lower extremity: On Eliquis Z86.718       Isolation/Infection:   Isolation            No Isolation          Patient Infection Status       Infection Onset Added Last Indicated Last Indicated By Review Planned Expiration Resolved Resolved By    None active    Resolved    COVID-19 Rule Out 08/20/20 08/20/20 08/21/20 Covid-19 Ambulatory (Ordered)   08/22/20 Rule-Out Test Resulted            Nurse Assessment:  Last Vital Signs: BP (!) 134/54   Pulse 91   Temp 98 °F (36.7 °C) (Oral)   Resp 14   Ht 5' 2\" (1.575 m)   Wt 163 lb (73.9 kg)   SpO2 93%   BMI 29.81 kg/m²     Last documented pain score (0-10 scale): Pain Level: 7  Last Weight:   Wt Readings from Last 1 Encounters:   06/18/21 163 lb (73.9 kg)     Mental Status:  oriented, alert, coherent, logical, thought processes intact and able to concentrate and follow conversation    IV Access:  - None    Nursing Mobility/ADLs:  Walking Independent  Transfer  Independent  Bathing  Independent  Dressing  Independent  Toileting  Independent  Feeding  Independent  Med [address]  Med Delivery   whole    Wound Care Documentation and Therapy:  Puncture 01/12/21 Wrist Anterior;Right (Active)   Number of days: 160        Elimination:  Continence:   · Bowel: Yes  · Bladder: Yes  Urinary Catheter: None   Colostomy/Ileostomy/Ileal Conduit: No       Date of Last BM:     Intake/Output Summary (Last 24 hours) at 6/22/2021 1000  Last data filed at 6/22/2021 0910  Gross per 24 hour   Intake 514 ml   Output    Net 514 ml     I/O last 3 completed shifts: In: 938 [I.V.:938]  Out: -     Safety Concerns:     None    Impairments/Disabilities:      None    Nutrition Therapy:  Current Nutrition Therapy:   - Oral Diet:  General    Routes of Feeding: Oral  Liquids: Thin Liquids  Daily Fluid Restriction: no  Last Modified Barium Swallow with Video (Video Swallowing Test): not done    Treatments at the Time of Hospital Discharge:   Respiratory Treatments: none  Oxygen Therapy:  is not on home oxygen therapy.   Ventilator:    - No ventilator support    Rehab Therapies: Physical Therapy and Occupational Therapy  Weight Bearing Status/Restrictions: No weight bearing restirctions  Other Medical Equipment (for information only, NOT a DME order):  none  Other Treatments: none    Patient's personal belongings (please select all that are sent with patient):  None    RN SIGNATURE:  Electronically signed by Aleisha Giron RN on 6/24/21 at 2:42 PM EDT    CASE MANAGEMENT/SOCIAL WORK SECTION    Inpatient Status Date: ***    Readmission Risk Assessment Score:  Readmission Risk              Risk of Unplanned Readmission:  42           Discharging to Facility/ Agency   · Name:   · Address:  · Phone:  · Fax:    Dialysis Facility (if applicable)   · Name:  · Address:  · Dialysis Schedule:  · Phone:  · Fax:    / signature: {Esignature:[id number]:::0}    PHYSICIAN SECTION    Prognosis: Fair    Condition at Discharge: Stable    Rehab Potential (if transferring to Rehab): Fair    Recommended Labs or Other Treatments After Discharge:     Physician Certification: I certify the above information and transfer of David Lopez  is necessary for the continuing treatment of the diagnosis listed and that she requires [address] for less 30 days.      Update Admission H&P: No change in H&P    PHYSICIAN SIGNATURE:  Electronically signed by Lupillo Victor MD on 6/22/21 at 10:01 AM EDT

## 2021-06-22 NOTE — PROGRESS NOTES
[facility]   [facility] [address]      Neurology progress note            Date:   6/22/2021  Patient name:  Sole Henry  Date of admission:  6/18/2021  YOB: 1965      Chief Complaint:     Chief Complaint   Patient presents with    Cerebrovascular Accident     brought in per EMS        Reason for Consult:      Status epilepticus    History of Present Illness:      The patient is a 60 y.o. 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,   In route by EMS patient had an episode of seizure-like activity administered 2 mg of Versed, resolution of seizure-like activity, patient was on Eliquis for prophylaxis secondary to her high risk of venous thrombosis secondary to known cancer,, patient in the emergency department had 2 episodes of generalized tonic-clonic seizures which aborted by 2 mg of Ativan patient was loaded with 1 g of Keppra and she subsequently underwent an MRI of the negative CT and CTA head and neck, MRI was also negative for any infarct, patient was intubated then after stabilizing her case patient got extubated on 3 L of nasal cannula oxygen, hemoglobin dropped to 6.8 secondary to chemotherapy, heme-onc is following, 1 unit of red blood cells has been administered, patient currently on 1000 g of Keppra twice daily,    There is a troponin elevation downtrending levels, EKG was unremarkable, patient is asymptomatic, elevated levels secondary to seizures, patient stabilized and transferred out of the ICU, under internal medicine primary team    Past Medical History:     Past Medical History:   Diagnosis Date    Anemia     Bleeding 10/2020 intra-abdominal bleeding -due to splenic mass with GI infiltration. Status post embolization    [facility] ([facility] [address]     Depression     Diabetes mellitus ([facility] [address]     GERD (gastroesophageal reflux disease)     Hx of blood clots     Hypertension     Metastatic cancer ([facility] [address] 10/2020    extensive intraabdominal and splenic involvement and lung mets.  Ovarian cancer ([facility] [address]     low grade serous ovarian carcinoma    Post chemo evaluation     2007: Chemo via med onc (Dr. Constantino Kasper), 2008: Dena Screws due to rising CA-125, 2013: intraperitoneal chemo,12/2015: Ca125 - 25     Splenic lesion         Past Surgical History:     Past Surgical History:   Procedure Laterality Date    ABSCESS DRAINAGE  2013    Franca rectal    ANUS SURGERY      ANAL FISSURECTOMY    CARDIAC CATHETERIZATION      COLECTOMY  03/2013    ex lap, tumor debulking, transverse colectomy w reanastamosis, subgastric omentectomy, intraperitoneal port placement    HYSTERECTOMY, TOTAL ABDOMINAL      IR EMBOLIZATION HEMORRHAGE  10/05/2020    intra-abdominal bleeding -due to splenic mass with GI infiltration. Status post embolization [employer] interlock coils x7. mri condtional 3t ok, safe immediately post implant.  IR PORT PLACEMENT EQUAL OR GREATER THAN 5 YEARS  08/24/2020    IR PORT PLACEMENT EQUAL OR GREATER THAN 5 YEARS 8/24/2020 Ainsley Pinon MD [facility]    PORT SURGERY      IP Port    TONSILLECTOMY          Medications Prior to Admission:     Prior to Admission medications    Medication Sig Start Date End Date Taking? Authorizing Provider   morphine (MS CONTIN) 15 MG extended release tablet Take 1 tablet by mouth 2 times daily for 30 days.  6/7/21 7/7/21  Lashaun Valenzuela MD   sertraline (ZOLOFT) 100 MG tablet Take 1 tablet by mouth daily 6/7/21 7/7/21  Mark Bhatia MD   metoprolol succinate (TOPROL XL) 25 MG extended release tablet Take 1 tablet by mouth daily 6/7/21   Lashaun Valenzuela MD   ELIQUIS 5 MG TABS tablet Take 1 tablet by mouth daily 21   Historical Provider, MD   pantoprazole (PROTONIX) 40 MG tablet Take 1 tablet by mouth 2 times daily 5/10/21   Vitaliy Trejo MD   levETIRAcetam (KEPPRA) 750 MG tablet Take 2 tablets by mouth 2 times daily 5/3/21   Vitaliy Trejo MD   ferrous sulfate (FE TABS 325) 325 (65 Fe) MG EC tablet Take 1 tablet by mouth daily (with breakfast) 3/12/21   Raghav Agustinper, APRN - NP   metFORMIN (GLUCOPHAGE-XR) 500 MG extended release tablet Take 2 tablets by mouth twice daily 21   Vitaliy Trejo MD   aspirin 81 MG chewable tablet Take 81 mg by mouth nightly Pt not taking    Historical Provider, MD        Allergies:     Ceftriaxone    Social History:     Tobacco:    reports that she has been smoking cigarettes. She has been smoking about 1.00 pack per day. She uses smokeless tobacco.  Alcohol:      reports previous alcohol use. Drug Use:  reports no history of drug use.     Family History:     Family History   Problem Relation Age of Onset    Alcohol Abuse Mother     Cirrhosis Mother        Review of Systems:       Constitutional Negative for fever and chills   HEENT Negative for ear discharge, ear pain, nosebleed   Eyes Negative for photophobia, pain and discharge   Respiratory Negative for hemoptysis and sputum   Cardiovascular Negative for orthopnea, claudication and PND   Gastrointestinal Negative for abdominal pain, diarrhea, blood in stool   Musculoskeletal Negative for joint pain, negative for myalgia   Skin Negative for rash or itching   hematology Negative for ecchymosis, anemia   Psychiatric Negative for suicidal ideation, anxiety, depression, hallucinations       Physical Exam:   BP (!) 129/50   Pulse 83   Temp 98.1 °F (36.7 °C) (Oral)   Resp 11   Ht 5' 2\" (1.575 m)   Wt 163 lb (73.9 kg)   SpO2 95%   BMI 29.81 kg/m²   Temp (24hrs), Av.1 °F (36.7 °C), Min:97.9 °F (36.6 °C), Max:98.3 °F (36.8 °C)        General examination:      General Appearance: alert, well appearing, and in no acute distress  HEENT: Normocephalic, atraumatic, moist mucus membranes  Neck: supple, no carotid bruits, (-) nuchal rigidity  Lungs:  Respirations unlabored, chest wall no deformity, BS normal  Cardiovascular: normal rate, regular rhythm  Abdomen: Soft, nontender, nondistended, normal bowel sounds  Skin: No gross lesions, rashes, bruising or bleeding on exposed skin area  Extremities:  peripheral pulses palpable, no cyanosis, clubbing or edema  Psych: normal affect      Neurological examination:      Mental status   Alert and oriented x 3; following all commands;   speech is fluent, no dysarthria, aphasia. Cranial nerves   II - visual fields intact to confrontation; pupils reactive  III, IV, VI  extraocular muscles intact; no JOSE A; no nystagmus; no ptosis   V - normal facial sensation                                                               VII - normal facial symmetry                                                             VIII - intact hearing                                                                             IX, X - symmetrical palate elevation                                               XI - symmetrical shoulder shrug                                                       XII - midline tongue without atrophy or fasciculation     Motor function  Strength:   5/5 RUE, 5/5 RLE  5/5 LUE, 5/5  LLE  Normal bulk and tone. Sensory function Intact to touch, pin, vibration, proprioception throughout     Cerebellar Intact finger-nose-finger testing. Intact heel-shin testing. No dysdiadochokinesia present. No tremors                        Reflex function 2/4 symmetric throughout . Downgoing plantar response bilaterally.  (-)Spear's sign bilaterally      Gait                  Not tested           Diagnostics:      Laboratory Testing:  CBC:   Recent Labs     06/20/21  0353 06/20/21  1742 06/21/21  0449 06/22/21  0414   WBC 14.5*  --  14.8* 15.6*   HGB 6. 8* 8.0* 8.0* 7.9*   PLT See Reflexed IPF Result  --  See Reflexed IPF Result See Reflexed IPF Result     BMP:    Recent Labs     06/20/21  0353 06/21/21  0449 06/22/21  0414    140 142   K 3.4* 3.7 4.0    106 106   CO2 26 27 24   BUN 3* 4* 8   CREATININE 0.23* 0.26* 0.32*   GLUCOSE 82 84 90         Lab Results   Component Value Date    CHOL 131 03/10/2021    LDLCHOLESTEROL 69 03/10/2021    HDL 33 (L) 03/10/2021    TRIG 147 03/10/2021    ALT 5 06/22/2021    AST 25 06/22/2021    TSH 2.93 03/09/2021    INR 0.9 06/18/2021    LABA1C 6.0 03/10/2021    [id number] 405 10/22/2020       No results found for: PHENYTOIN, PHENYTOIN, VALPROATE, CBMZ      Imaging/Diagnostics:  CT Head WO Contrast    Result Date: 6/18/2021  EXAMINATION: CT OF THE HEAD WITHOUT CONTRAST  6/18/2021 9:19 am TECHNIQUE: CT of the head was performed without the administration of intravenous contrast. Dose modulation, iterative reconstruction, and/or weight based adjustment of the mA/kV was utilized to reduce the radiation dose to as low as reasonably achievable. COMPARISON: 03/09/2021 HISTORY: ORDERING SYSTEM PROVIDED HISTORY: stroke TECHNOLOGIST PROVIDED HISTORY: stroke Decision Support Exception - unselect if not a suspected or confirmed emergency medical condition->Emergency Medical Condition (MA) FINDINGS: BRAIN/VENTRICLES: There is no acute infarct or acute intracranial hemorrhage present. There is no mass effect or midline shift present. There is no ventriculomegaly or abnormal extra-axial fluid collection present. ORBITS: Limited evaluation of the orbits is unremarkable. SINUSES: The paranasal sinuses and mastoid air cells are clear. SOFT TISSUES/SKULL:  No lytic or blastic osseous lesions are identified. No acute intracranial process identified. The findings were sent to the Radiology Results [id number] at 9:41 am on 6/18/2021to be communicated to the Stroke Neurology service.      XR CHEST PORTABLE    Result Date: 6/21/2021  EXAMINATION: ONE XRAY VIEW OF THE CHEST 6/21/2021 10:50 am COMPARISON: Previous chest x-ray from 06/19/2021 HISTORY: ORDERING SYSTEM PROVIDED HISTORY: PNA rule out TECHNOLOGIST PROVIDED HISTORY: PNA rule out Cervical cancer, diabetes, GERD and hypertension. FINDINGS: Overlying ECG monitor leads and gown snaps. Stable right IJ chemo port with unchanged tip position. Coils splenic artery. Enlarged but unchanged appearing cardiac silhouette with a somewhat left ventricular configuration. Mediastinal structures appropriate for slight rotation to the left. Elevated right hemidiaphragm with perhaps medial right basilar atelectasis; additional bibasilar atelectatic appearing changes, greater left lateral base. Minimal infiltrate at the left base possible. .  Mild unchanged blunting left lateral costophrenic sulcus. No Kerley lines. Bones unchanged. Left basilar patchy opacities, probably atelectatic with a small pleural effusion. Developing infiltrate at the left base should be considered. Elevated right hemidiaphragm with probable right medial basilar atelectasis. Cardiomegaly. Otherwise stable findings. RECOMMENDATION: Short-term follow-up chest x-ray. XR CHEST PORTABLE    Result Date: 6/19/2021  EXAMINATION: ONE XRAY VIEW OF THE CHEST 6/19/2021 6:49 am COMPARISON: Chest June 18, 2021. HISTORY: ORDERING SYSTEM PROVIDED HISTORY: intubated TECHNOLOGIST PROVIDED HISTORY: intubated FINDINGS: Right-sided port is in place. ET enteric tubes are in satisfactory positions. Presumed coil embolization is seen involving the left upper quadrant. Heart and mediastinal structures appear unchanged. Left lower lobe atelectasis with small left pleural effusion is seen and appears unchanged. The right lung appears relatively clear. No pneumothorax or free air. No significant change in chest findings.      XR CHEST PORTABLE    Result Date: 6/18/2021  EXAMINATION: ONE XRAY VIEW OF THE CHEST 6/18/2021 10:22 am COMPARISON: AP chest from 03/09/2021; CT chest from 03/09/2021 HISTORY: ORDERING SYSTEM PROVIDED HISTORY: post intubation TECHNOLOGIST PROVIDED HISTORY: post intubation History of ovarian cancer and osseous metastases with previous splenic embolization. FINDINGS: ETT tip position satisfactory approximately 4 cm above the alejandro. Enteric tube tip and side hole project below left hemidiaphragm. Right IJ port in unchanged position, with good tip position in the upper right atrium. Coils splenic artery LUQ. Enlarged but stable appearing cardiac silhouette. Mediastinal structures appropriate for slight rotation to the left. Low lung volumes; mildly increased interstitial markings with scattered parenchymal densities and unchanged increased hilar shadows, L>R. Minimal unchanged blunting left costophrenic angle. No new pulmonary or right pleural abnormality. Scattered bony sclerotic foci, compatible with known Mets. No destructive lesion or obvious interval change. Support tubes satisfactory. Similar enlarged susan (known adenopathy), scattered parenchymal densities (known pulmonary metastases) without significant interval change; probable smaller left pleural effusion. CTA HEAD NECK W CONTRAST    Result Date: 6/18/2021  EXAMINATION: CTA OF THE HEAD AND NECK WITH CONTRAST 6/18/2021 9:19 am: TECHNIQUE: CTA of the head and neck was performed with the administration of intravenous contrast. Multiplanar reformatted images are provided for review. MIP images are provided for review. Stenosis of the internal carotid arteries measured using NASCET criteria. Dose modulation, iterative reconstruction, and/or weight based adjustment of the mA/kV was utilized to reduce the radiation dose to as low as reasonably achievable.  COMPARISON: CT brain earlier same day HISTORY: ORDERING SYSTEM PROVIDED HISTORY: stroke TECHNOLOGIST PROVIDED HISTORY: stroke Decision Support Exception - unselect if not a suspected or Correlation with any history of attempted left central venous catheter placement is recommended. MRI LIMITED BRAIN    Result Date: 6/18/2021  EXAMINATION: MRI OF THE BRAIN WITHOUT CONTRAST  6/18/2021 11:03 am TECHNIQUE: Multiplanar multisequence MRI of the brain was performed without the administration of intravenous contrast. COMPARISON: October 23, 2020 HISTORY: ORDERING SYSTEM PROVIDED HISTORY: acute right sided weakness, seizures, prior hx of PRES TECHNOLOGIST PROVIDED HISTORY: acute right sided weakness, seizures, prior hx of PRES Reason for Exam: cva, rt sided weakness FINDINGS: INTRACRANIAL STRUCTURES/VENTRICLES: There is no restricted diffusion to suggest acute infarct. No mass effect or midline shift. No evidence of an acute intracranial hemorrhage. The ventricles and sulci are normal in size and configuration. Scattered white matter changes in the subcortical white matter periventricular white matter present. There is no evidence for blood products on gradient imaging. The sellar/suprasellar regions appear unremarkable. The normal signal voids within the major intracranial vessels appear maintained. ORBITS: The visualized portion of the orbits demonstrate no acute abnormality. SINUSES: The visualized paranasal sinuses and mastoid air cells are well aerated. BONES/SOFT TISSUES: The bone marrow signal intensity appears normal. The soft tissues demonstrate no acute abnormality. Mild chronic white matter changes mild volume loss.  No evidence for restricted diffusion to suggest acute infarct       Impression:      40-year-old female with status epilepticus known history of ovarian cancer with mental status is recently diagnosed with press syndrome, severe anemia transfuse 1 unit of RBC,    -History of seizure secondary to PRES  -History of ovarian carcinoma  -Encephalopathy: Improved    Plan:     -LTM anything on 6/19: Diffuse theta slowing and previous of diffuse attenuation suggesting mild to

## 2021-06-22 NOTE — PROGRESS NOTES
Physical Therapy    Facility/Department: [address]  Initial Assessment    NAME: Sharri Horvath  : 1965  MRN: [id number]  From H and P: 58-year-old patient admitted with status epilepticus -multiple witnessed generalized tonic-clonic seizures, acute encephalopathy secondary to seizures  Intubated in ER and started on mechanical ventilation  IV Keppra antiseizure medication loading dose  Stat CT CTA were completed and were unremarkable  Start MRI brain study with no diffusion restriction/no ischemic stroke   Of notepatient was diagnosed with PRES syndrome in 2021 and was started on antiseizure medications. She has history of metastatic ovarian cancer and is on chemotherapy and is being followed by heme-onc team.  Date of Service: 2021    Discharge Recommendations:  Patient would benefit from continued therapy after discharge   PT Equipment Recommendations  Equipment Needed: Yes  Mobility Devices: Canes  Cane: Straight Cane    Assessment   Body structures, Functions, Activity limitations: Decreased functional mobility ; Decreased balance  Assessment: Patient's symptoms are vague. Neither distinctly vertigo nor lightheadedness. Symptoms are provoked with both horizontal rolling both directions and with positional changes to gravity. During rolling, symptoms lasted approx 20 seconds both directions. Symptoms upon standing from bed were more vague, unable to be timed. Recommend patient get and use a straight cane. She verbalizes she has a walker. Patient will be treated for gait, stairs, and fall prevention strategies.   Prognosis: Good  Decision Making: Medium Complexity  Clinical Presentation: evolving  PT Education: Goals;Transfer Training;Functional Mobility Training;Disease Specific Education;General Safety;Plan of Care;Precautions;Gait Training;PT Role;Low Vision Education  REQUIRES PT FOLLOW UP: Yes  Activity Tolerance  Activity Tolerance: Patient limited by endurance       Patient Diagnosis(es): The encounter diagnosis was Status epilepticus ([facility] [address] has a past medical history of Anemia, Bleeding, Cervical cancer ([facility] [address] Depression, Diabetes mellitus ([facility] [address] GERD (gastroesophageal reflux disease), Hx of blood clots, Hypertension, Metastatic cancer ([facility] [address] Ovarian cancer ([facility] [address] Post chemo evaluation, and Splenic lesion. has a past surgical history that includes Hysterectomy, total abdominal; Port Surgery; Tonsillectomy; IR PORT PLACEMENT > 5 YEARS (08/24/2020); Anus surgery; Abscess Drainage (2013); colectomy (03/2013); IR EMBOLIZATION HEMORRHAGE (10/05/2020); and Cardiac catheterization. Restrictions  Position Activity Restriction  Other position/activity restrictions: Hgb low at 7.9. Has a left chronic DVT. On Eliquis. Vision/Hearing  Vision: Within Functional Limits (Denies double vision, blurriness)  Hearing: Within functional limits     Subjective  General  Chart Reviewed: Yes  Patient assessed for rehabilitation services?: Yes  Family / Caregiver Present: No  Follows Commands: Within Functional Limits  Pain Screening  Patient Currently in Pain: Denies  Vital Signs  Patient Currently in Pain: Denies       Orientation  Orientation  Overall Orientation Status: Within Functional Limits  Social/Functional History  Social/Functional History  Lives With: Son  Type of Home: House  Home Layout: Two level (Bedroom first floor, half bath first floor, full bath 2nd floor)  Home Access: Stairs to enter without rails  Entrance Stairs - Number of Steps: 4  ADL Assistance: Independent  Homemaking Assistance: Independent  Ambulation Assistance: Independent  Transfer Assistance: Independent  Additional Comments: Denies any recent falls. Reports limited gait distances recently due to \"heart races\" and shortness of breath.   Cognition   Cognition  Overall Cognitive Status: WFL    Objective     Observation/Palpation  Observation: She has an area of open skin on her right cheek that she thinks is from the oxygen mask. Strength RLE  Strength RLE: Exception  R Hip Flexion: 4+/5  R Hip Extension: 4+/5  R Ankle Dorsiflexion: 4+/5  R Ankle Plantar flexion: 4+/5  Strength LLE  Strength LLE: Exception  L Hip Flexion: 4+/5  L Hip Extension: 4+/5  L Ankle Dorsiflexion: 4+/5  L Ankle Plantar Flexion: 4+/5     Sensation  Overall Sensation Status: WFL  Bed mobility  Supine to Sit: Independent  Sit to Supine: Independent  Transfers  Sit to Stand: Stand by assistance  Stand to sit: Stand by assistance  Ambulation  Ambulation?: Yes  Ambulation 1  Surface: level tile  Device: No Device  Assistance: Contact guard assistance  Gait Deviations: Slow Cynthia  Distance: 90'  Comments: Declined further distance than 80' due to \"heart racing\". Upon rehooking telemetry, pulse at 105 bpm.  Denies a spinning sensation. Balance  Standing - Static: Fair  Standing - Dynamic: Fair  Comments: She was able to  Rhomberg without UE support, no need for additional assist, minimal sway only. With eyes closed 10 seconds, she has an increase in sway but did not lose balance enough to need assistance. She was given education on the role of vision for balance, safety in the shower when rinsing her face/head, use of good lighting at night to prevent worsened steadiness. Plan   Plan  Times per week: 5-6x/wk  Current Treatment Recommendations: Strengthening, Transfer Training, Endurance Training, Balance Training, Gait Training, Home Exercise Program, Functional Mobility Training, Stair training, Safety Education & Training, Positioning, Vestibular Rehab, Patient/Caregiver Education & Training, Equipment Evaluation, Education, & procurement  Safety Devices  Type of devices:  All fall risk precautions in place, Left in bed, Call light within reach, Gait belt, Nurse notified                        AM-PAC Score  AM-PAC Inpatient Mobility Raw Score : 23 (06/22/21 1027)  AM-PAC Inpatient T-Scale Score : 56.93 (06/22/21 1027)  Mobility

## 2021-06-22 NOTE — PLAN OF CARE
Problem: OXYGENATION/RESPIRATORY FUNCTION  Goal: Patient will maintain patent airway  Outcome: Ongoing  Goal: Patient will achieve/maintain normal respiratory rate/effort  Description: Respiratory rate and effort will be within normal limits for the patient  Outcome: Ongoing     Problem: SKIN INTEGRITY  Goal: Skin integrity is maintained or improved  Outcome: Ongoing     Problem: Skin Integrity:  Goal: Will show no infection signs and symptoms  Description: Will show no infection signs and symptoms  Outcome: Ongoing  Goal: Absence of new skin breakdown  Description: Absence of new skin breakdown  Outcome: Ongoing     Problem: Falls - Risk of:  Goal: Will remain free from falls  Description: Will remain free from falls  6/22/2021 0216 by Angie Brown RN  Outcome: Ongoing  6/21/2021 1522 by Sina Lopez RN  Outcome: Met This Shift  Note: Fall risk precautions in place. Bed in lowest position with wheels locked, bed alarm in place and activated, non-skid socks on pt, fall risk id on pt, call light in reach, pt encouraged to call before getting out of bed and for any other needs or c/o.     Goal: Absence of physical injury  Description: Absence of physical injury  Outcome: Ongoing     Problem: Nutrition  Goal: Optimal nutrition therapy  Outcome: Ongoing     Problem: Pain:  Goal: Pain level will decrease  Description: Pain level will decrease  Outcome: Ongoing  Goal: Control of acute pain  Description: Control of acute pain  Outcome: Ongoing  Goal: Control of chronic pain  Description: Control of chronic pain  Outcome: Ongoing

## 2021-06-22 NOTE — CONSULTS
ok, safe immediately post implant.  IR PORT PLACEMENT EQUAL OR GREATER THAN 5 YEARS  08/24/2020    IR PORT PLACEMENT EQUAL OR GREATER THAN 5 YEARS 8/24/2020 Niki Estrada MD [facility] SPECIAL PROCEDURES    PORT SURGERY      IP Port    TONSILLECTOMY         SOCIAL HISTORY  Social History     Tobacco Use    Smoking status: Current Every Day Smoker     Packs/day: 1.00     Types: Cigarettes    Smokeless tobacco: Current User   Vaping Use    Vaping Use: Never used   Substance Use Topics    Alcohol use: Not Currently    Drug use: Never       ALLERGIES  Allergies   Allergen Reactions    Ceftriaxone      Had a rash on ceftriaxone December 2020, [facility]         MEDICATIONS  Current Medications    famotidine  20 mg Oral BID    sodium chloride  500 mL Intravenous Once    amoxicillin-clavulanate  1 tablet Oral 2 times per day    ferrous sulfate  325 mg Oral Daily with breakfast    sertraline  100 mg Oral Daily    morphine  15 mg Oral 2 times per day    levETIRAcetam  1,000 mg Oral BID    apixaban  5 mg Oral Daily    sodium chloride flush  5-40 mL Intravenous 2 times per day    aspirin  324 mg Oral Once     meclizine, sodium chloride, oxyCODONE-acetaminophen, sodium chloride flush, sodium chloride, ondansetron **OR** ondansetron, polyethylene glycol, acetaminophen **OR** acetaminophen, fentanNYL  IV Drips/Infusions   sodium chloride      sodium chloride       Home Medications  No current facility-administered medications on file prior to encounter. Current Outpatient Medications on File Prior to Encounter   Medication Sig Dispense Refill    morphine (MS CONTIN) 15 MG extended release tablet Take 1 tablet by mouth 2 times daily for 30 days.  60 tablet 0    sertraline (ZOLOFT) 100 MG tablet Take 1 tablet by mouth daily 30 tablet 3    metoprolol succinate (TOPROL XL) 25 MG extended release tablet Take 1 tablet by mouth daily 30 tablet 3    ELIQUIS 5 MG TABS tablet Take 1 tablet by mouth daily  pantoprazole (PROTONIX) 40 MG tablet Take 1 tablet by mouth 2 times daily 60 tablet 0    levETIRAcetam (KEPPRA) 750 MG tablet Take 2 tablets by mouth 2 times daily 240 tablet 2    ferrous sulfate (FE TABS 325) 325 (65 Fe) MG EC tablet Take 1 tablet by mouth daily (with breakfast) 30 tablet 0    metFORMIN (GLUCOPHAGE-XR) 500 MG extended release tablet Take 2 tablets by mouth twice daily 120 tablet 5    aspirin 81 MG chewable tablet Take 81 mg by mouth nightly Pt not taking         Data         BP (!) 134/54   Pulse 91   Temp 98 °F (36.7 °C) (Oral)   Resp 14   Ht 5' 2\" (1.575 m)   Wt 163 lb (73.9 kg)   SpO2 93%   BMI 29.81 kg/m²     Wt Readings from Last 3 Encounters:   06/18/21 163 lb (73.9 kg)   06/04/21 155 lb (70.3 kg)   05/28/21 160 lb 8 oz (72.8 kg)        Code Status: Full Code     ADVANCED CARE PLANNING:  Patient has capacity for medical decisions: yes  Health Care Power of : yes  Living Will: no     Personal, Social, and Family History  Marital Status: single  Living situation: with family:  son  Importance of bernardo/Catholic/spiritual beliefs: [] Very [x] Somewhat [] Not   Psychological Distress: mild  Does patient understand diagnosis/treatment? yes  Does caregiver understand diagnosis/treatment? yes    Past Medical History:   Diagnosis Date    Anemia     Bleeding 10/2020    intra-abdominal bleeding -due to splenic mass with GI infiltration. Status post embolization    [facility]     Depression     Diabetes mellitus ([facility]     GERD (gastroesophageal reflux disease)     Hx of blood clots     Hypertension     Metastatic cancer ([facility] 10/2020    extensive intraabdominal and splenic involvement and lung mets.     Ovarian cancer ([facility]     low grade serous ovarian carcinoma    Post chemo evaluation     2007: Chemo via med onc (Dr. Marcus Harp), 2008: Trevino Din due to rising CA-125, 2013: intraperitoneal chemo,12/2015: Ca125 - 25     Splenic lesion          Family History Problem Relation Age of Onset    Alcohol Abuse Mother     Cirrhosis Mother        Social History     Tobacco Use    Smoking status: Current Every Day Smoker     Packs/day: 1.00     Types: Cigarettes    Smokeless tobacco: Current User   Vaping Use    Vaping Use: Never used   Substance Use Topics    Alcohol use: Not Currently    Drug use: Never           Assessment        REVIEW OF SYSTEMS  Constitutional: no fever, no chills or weight loss  Eyes: no eye pain or blurred vision  ENT: no hearing loss, congestion, or difficulty swallowing   Respiratory: no wheezing, chest tightness, or shortness of breath   Cardiovascular: no chest pain or pressure, no palpitations, no diaphoresis   Gastrointestinal: no nausea, vomiting,abdominal pain, diarrhea or constipation, no melena   Genitourinary: no dysuria, frequency, hematuria, or nocturia   Musculoskeletal: no myalgias or arthralgias, no back pain   Skin: no rashes or sores   Neurological: no focal weakness, numbness, tingling or headache, no seizures, + dizziness    PHYSICAL ASSESSMENT:  Constitutional: Alert and oriented to person, place, and time. Head: Normocephalic and atraumatic. Eyes: EOM are normal. Pupils are equal, round. Neck: Normal range of motion. Neck supple. No tracheal deviation present. Cardiovascular: Normal rate and regular rhythm, S1, S2, no murmur. Pulmonary/Chest: Effort normal and breath sounds normal. No rales or wheezes. Abdomen: Soft. No tenderness, not distended, no ascites, no organomegaly. Musculoskeletal: Normal range of motion. No edema lower ext. Neurological: Alert, awake, oriented, having commands  Skin: Normal turgor, no bleeding, no bruising. Palliative Performance Scale:  ___60%  Ambulation reduced; Significant disease; Can't do hobbies/housework; intake normal or reduced; occasional assist; LOC full/confusion  ___50%  Mainly sit/lie;  Extensive disease; Can't do any work; Considerable assist; intake normal or told that he wanted palliative care help for the patient at their home so that patient's symptoms can be managed and she does not have to come to the hospital for small medical issues  I explained to Mira Moss that  can help them in contacting palliative care facilities that provide palliative care at home  I met with Yumi and updated her regarding my conversation with patient's son Mira Moss and also told that family would like to have palliative care at home for the patient    I offered comfort and emotional support to the patient and family      [address] (ACP) Physician/NP/PA (Provider) Dorian Jarvis      Date of ACP Conversation: 6/18/2021    Conversation Conducted with:   Patient with Decision Making Capacity    Health Care Decision Maker:    Current Designated Health Care Decision Maker:   Patient's POA for health is patient's son Queenie Montero (son hipaa) - [phone] as per patient's POA paperwork for health    Secondary Decision Maker: Hector Oh, [address] - [phone]    Care Preferences:    Hospitalization: \"If your health worsens and it becomes clear that your chance of recovery is unlikely, what would your preference be regarding hospitalization? \"  Currently hospitalized      Ventilation: \"If you were in your present state of health and suddenly became very ill and were unable to breathe on your own, what would your preference be about the use of a ventilator (breathing machine) if it was available to you? \"    Full code as per patient's wishes    \"If your health worsens and it becomes clear that your chance of recovery is unlikely, what would your preference be about the use of a ventilator (breathing machine) if it was available to you? \"   Full code as per patient's wishes    Resuscitation:  \"CPR works best to restart the heart when there is a sudden event, like a heart attack, in someone who is otherwise healthy.  Unfortunately, CPR does not typically restart the heart for people who have serious health conditions or who are very sick. \"    \"In the event your heart stopped as a result of an underlying serious health condition, would you want attempts to be made to restart your heart (answer \"yes\" for attempt to resuscitate) or would you prefer a natural death (answer \"no\" for do not attempt to resuscitate)? \"   Full code as per patient's wishes      Conversation Outcomes / Follow-Up Plan:   Patient's POA for health is patient's son Dev Moore ([phone]) as per patient's POA paperwork for health    Patient's CODE STATUS remains as full code as per patient's wishes      Length of Voluntary ACP Conversation in minutes:  16 minutes       Jaylen Jackson MD          Education/support to staff  Education/support to family  Education/support to patient  Discharge planning/helping to coordinate care  Communications with primary service  Caregiver support/education  Code status clarified: Full Code  Code status clarified: [facility]  Code status clarified: [facility]  Other major issues     Principle Problem/Diagnosis:  Status epilepticus ([address]    Additional Assessments:   Principal Problem:    Status epilepticus ([address]  Active Problems:    Malignant neoplasm of ovary ([address]    Type 2 diabetes mellitus without complication, without long-term current use of insulin (HCC)    Hypochromic anemia    PRES (posterior reversible encephalopathy syndrome)    History of ovarian cancer    Chronic deep vein thrombosis (DVT) of femoral vein of left lower extremity (HCC)    Thrombocytosis ([address]    History of deep venous thrombosis (DVT) of distal vein of left lower extremity: On Eliquis      1- Symptom management/ pain control     Pain Assessment:  Pain is controlled with current analgesics. Medication(s) being used: acetaminophen, narcotic analgesics including MS Contin, fentanyl IV, Percocet.                Anxiety:  dizziness, fatigue                          Dyspnea:  none Fatigue:  Tiredness, weakness    We feel the patient symptoms are being controlled. her current regimen is reviewed by myself and discussed with the staff. We will continue to follow-up with the patient and family for further goals of care  We will continue to provide comfort and support to the patient and family    Patient's POA for health is patient's son Katalina Astudillo ([phone]) as per patient's POA paperwork for health    Patient's ACP note has been done by me    2- Goals of care evaluation   The patient goals of care are improve or maintain function/quality of life, accomplish a particular personal goal, spiritual needs, strengthening relationships, preserve independence/autonomy/control and support for family/caregiver   Goals of care discussed with:    [] Patient independently    [x] Patient and Family    [] Family or Healthcare DPOA independently    [] Unable to discuss with patient, family/DPOA not present    3- Code Status  Full Code    4- Other recommendations   - We will continue to provide comfort and support to the patient and the family  Please call with any palliative questions or concerns. Palliative Care Team is available via perfect serve or via phone. Palliative Care will continue to follow Ms. Garcia's care as needed. Thank you for allowing Palliative Care to participate in the care of Ms. Garcia . This note has been dictated by dragon, typing errors may be a possibility.     The total time I spent in seeing the patient, discussing goals of care, advanced directives, code status, greater than 50% time in counseling and other major issues was more than 80 minutes      Electronically signed by   Asher Rodriguez MD  Palliative Care Team  on 6/22/2021 at 10:47 AM    Palliative care office: [phone]

## 2021-06-22 NOTE — PROGRESS NOTES
Occupational [address]  Occupational Therapy Not Seen Note    DATE: 2021    NAME: Diandra Guerra  MRN: [id number]   : 1965      Patient not seen this date for Occupational Therapy due to:    Testing: Off floor for echo    Next Scheduled Treatment: Check      Electronically signed by Francis Guajardo OT on 2021 at 2:58 PM

## 2021-06-22 NOTE — PROGRESS NOTES
[facility]  Office: [address], DO, Marisa Evans, DO, Liz Lay, DO, Jerrica [facility] Blood, DO, Steve Caro MD, Lily Santos MD, Clark Palomares MD, Mary Hernandez MD, Gracie Claros MD, Zuleima Montes MD, Elysia Cohen MD, Santo Atkins MD, Pebbles Bryant, DO, Emil Bowen MD, Hannah Bush, DO, Harrietta Goldberg, MD,  Ariel Oakes, DO, Jessica Becerril MD, Klesie Cruz MD, Caesar Dorado MD, Jerry Alvarenga MD, Marine Patient, Santiago Mathews, CNP, Miguel Kang, CNP, August Camacho, CNS, Shyanne Comer, CNP, Derek Tapia, CNP, Chelsey Quinonez, CNP, Vandana Pritchard, CNP, Kee Khalil, CNP, TESFAYE PatelC, Tori Maier, [facility], Inge Milan, CNP, Jimenez Aragon, CNP, Pablo Olmos, CNP, Renee Baldwin, CNP, Kenneth Plush, CNP, Corrie Blair, [address] [place] [address]    Progress Note    6/22/2021    7:48 AM    Name:   David Lopez  MRN:     [id number]     Acct:      [de-identified]   Room:   [address] [place] [address] Day:  4  Admit Date:  6/18/2021  9:17 AM    PCP:   Elsa Jain MD  Code Status:  Full Code    Subjective:     C/C:   Chief Complaint   Patient presents with    Cerebrovascular Accident     brought in per EMS      Interval History Status: unchanged. Pt is c/o lightheadedness upon sitting up or standing. No cough or fevers, she has not received an incentive spirometer. Pain is controlled. She is eating pretty well. She is just getting up with PT today. She does c/o some mild dizziness or \"room spinning\" when she rolls on her sides in bed. Son just walked in her room and has many questions and concerns. Brief History:     Per my partner:  \"60year-old female past medical history of recurrent ovarian cancer originally diagnosed in 2005, thrombocytosis, depression, prior history of DVT in March 2021, iron deficiency anemia presented on 6/18/2021 due to altered mental status.   Patient does blood clots, Hypertension, Metastatic cancer ([facility] Ovarian cancer ([facility] Post chemo evaluation, and Splenic lesion. Social History:   reports that she has been smoking cigarettes. She has been smoking about 1.00 pack per day. She uses smokeless tobacco. She reports previous alcohol use. She reports that she does not use drugs. Family History:   Family History   Problem Relation Age of Onset    Alcohol Abuse Mother     Cirrhosis Mother        Vitals:  BP (!) 129/50   Pulse 83   Temp 98.1 °F (36.7 °C) (Oral)   Resp 11   Ht 5' 2\" (1.575 m)   Wt 163 lb (73.9 kg)   SpO2 95%   BMI 29.81 kg/m²   Temp (24hrs), Av.2 °F (36.8 °C), Min:97.9 °F (36.6 °C), Max:98.9 °F (37.2 °C)    No results for input(s): POCGLU in the last 72 hours. I/O (24Hr):     Intake/Output Summary (Last 24 hours) at 2021 0748  Last data filed at 2021 1600  Gross per 24 hour   Intake 938 ml   Output    Net 938 ml       Labs:  Hematology:  Recent Labs     21  0353 21  1742 21  0449 21  0414   WBC 14.5*  --  14.8* 15.6*   RBC 3.27*  --  3.61* 3.60*   HGB 6.8* 8.0* 8.0* 7.9*   HCT 26.7* 28.7* 29.9* 29.7*   MCV 81.7*  --  82.8 82.5*   MCH 20.8*  --  22.2* 21.9*   MCHC 25.5*  --  26.8* 26.6*   RDW 27.9*  --  25.6* 26.8*   PLT See Reflexed IPF Result  --  See Reflexed IPF Result See Reflexed IPF Result   MPV NOT REPORTED  --  NOT REPORTED NOT REPORTED     Chemistry:  Recent Labs     21  1102 21  1523 21  2352 21  0353 21  0939 21  0449 21  0414   NA  --   --   --  139  --  140 142   K  --   --   --  3.4*  --  3.7 4.0   CL  --   --   --  105  --  106 106   CO2  --   --   --  26  --  27 24   GLUCOSE  --   --   --  82  --  84 90   BUN  --   --   --  3*  --  4* 8   CREATININE  --   --   --  0.23*  --  0.26* 0.32*   MG  --   --   --  1.8  --  2.2 1.8   ANIONGAP  --   --   --  8*  --  7* 12   LABGLOM  --   --   --  >60  --  >60 >60   GFRAA  --   --   --  >60  --  >60 >60   CALCIUM  --   --   --  8.2*  --  8.4* 8.5*   PHOS  --   --   --  2.8  --  2.7 3.6   TROPHS 115* 111*  --   --   --   --   --    LACTACIDWB 1.4 1.1 0.9 1.2 1.1  --   --      Recent Labs     06/20/21  0353 06/21/21  0449 06/22/21  0414   PROT 5.5* 5.6* PENDING   LABALBU 3.1* 3.0* PENDING   AST 14 10 PENDING   ALT 5 <5* PENDING   ALKPHOS 52 50 PENDING   BILITOT 0.21* <0.10* PENDING     ABG:  Lab Results   Component Value Date    POCPH 7.426 06/19/2021    POCPCO2 42.5 06/19/2021    POCPO2 98.7 06/19/2021    POCHCO3 28.0 06/19/2021    NBEA NOT REPORTED 06/19/2021    PBEA 3 06/19/2021    RFB5IQR NOT REPORTED 06/19/2021    AFBM3BMD 98 06/19/2021    FIO2 30.0 06/19/2021     Lab Results   Component Value Date/Time    SPECIAL R HAND 20ML 06/21/2021 09:32 AM     Lab Results   Component Value Date/Time    CULTURE NO GROWTH 13 HOURS 06/21/2021 09:32 AM       Radiology:  CT Head WO Contrast    Result Date: 6/18/2021  No acute intracranial process identified. The findings were sent to the Radiology Results [address] at 9:41 am on 6/18/2021to be communicated to the Stroke Neurology service. XR CHEST PORTABLE    Result Date: 6/21/2021  Left basilar patchy opacities, probably atelectatic with a small pleural effusion. Developing infiltrate at the left base should be considered. Elevated right hemidiaphragm with probable right medial basilar atelectasis. Cardiomegaly. Otherwise stable findings. RECOMMENDATION: Short-term follow-up chest x-ray. XR CHEST PORTABLE    Result Date: 6/19/2021  No significant change in chest findings. XR CHEST PORTABLE    Result Date: 6/18/2021  Support tubes satisfactory. Similar enlarged susan (known adenopathy), scattered parenchymal densities (known pulmonary metastases) without significant interval change; probable smaller left pleural effusion. CTA HEAD NECK W CONTRAST    Result Date: 6/18/2021  1.  No large vessel occlusion, significant stenosis or cerebral aneurysm identified. 2. No significant arterial stenosis identified within the neck. 3. Several foci of gas lucency within the lower left neck. Correlation with any history of attempted left central venous catheter placement is recommended. MRI LIMITED BRAIN    Result Date: 6/18/2021  Mild chronic white matter changes mild volume loss. No evidence for restricted diffusion to suggest acute infarct       Physical Examination:        General appearance:  alert, cooperative and no distress  Mental Status:  oriented to person, place and time and normal affect  Lungs:  clear to auscultation bilaterally, normal effort  Heart:  regular rate and rhythm, no murmur  Abdomen:  soft, nontender, nondistended, normal bowel sounds, no masses, hepatomegaly, splenomegaly  Extremities:  no edema, redness, tenderness in the calves  Skin:  Left Facial eschar, healing    Assessment:        Hospital Problems         Last Modified POA    * (Principal) Status epilepticus ([address] 6/20/2021 Yes    Malignant neoplasm of ovary ([address] 6/20/2021 Yes    Type 2 diabetes mellitus without complication, without long-term current use of insulin ([address] 6/20/2021 Yes    Hypochromic anemia 6/20/2021 Yes    PRES (posterior reversible encephalopathy syndrome) 6/20/2021 Yes    History of ovarian cancer 6/20/2021 Yes    Thrombocytosis ([address] 6/20/2021 Yes    History of deep venous thrombosis (DVT) of distal vein of left lower extremity: On Eliquis 6/20/2021 Yes          Plan:        1. Status epilepticus- resolved. On Keppra 1000 mg BID per Neurology  2. Ovarian cancer- chemotherapy on hold per Hem/Onc, pain meds controlling pain. Will ask palliative care to assist in family support. Son is caregiver and very stressed with his mother having multiple issues in a short period of time  3. PRES  4. Anemia- iron deficiency- s/p 1 unit PRBCs, on iron supplement, monitor H/H  5. Thrombocytosis- on ASA, likely reactive  6. Chronic DVT left LE- on Eliquis  7.  DM2- last HbA1C: 6 in Jan 2021, on dietary supplements   8. Lightheadedness-  Check Orthostatic BP again, may need IVF or Tavo hose, try Meclizine prn  9. Left lung atelectasis- start incentive spirometer q2 hrs while awake, on Augmentin for suspected pneumonia, CXR negative except for small effusion and/or atelectasis. Son is insisting that she continue the Augmentin since her WBC count is still elevated  10. Elevated troponins- may be from seizures? Pt's son is worried about her heart, since hx EF 40% in Jan 2021. Will repeat another Echo per his wishes  6. Gi proph- start Pepcid BID  12. PT/OT    DC planning- Sade signed, Home with home PT/OT likely Wed.     Adriana Tapia MD  6/22/2021  7:48 AM

## 2021-06-22 NOTE — PLAN OF CARE
Problem: OXYGENATION/RESPIRATORY FUNCTION  Goal: Patient will maintain patent airway  6/22/2021 1606 by Dipti Garcia RN  Outcome: Ongoing  6/22/2021 0216 by Luis Hammer RN  Outcome: Ongoing  Goal: Patient will achieve/maintain normal respiratory rate/effort  Description: Respiratory rate and effort will be within normal limits for the patient  6/22/2021 1606 by Dipti Garcia RN  Outcome: Ongoing  6/22/2021 0216 by Luis Hammer RN  Outcome: Ongoing     Problem: SKIN INTEGRITY  Goal: Skin integrity is maintained or improved  6/22/2021 1606 by Dipti Garcia RN  Outcome: Ongoing  6/22/2021 0216 by Luis Hammer RN  Outcome: Ongoing     Problem: Skin Integrity:  Goal: Will show no infection signs and symptoms  Description: Will show no infection signs and symptoms  6/22/2021 1606 by Dipti Garcia RN  Outcome: Ongoing  6/22/2021 0216 by Luis Hammer RN  Outcome: Ongoing  Goal: Absence of new skin breakdown  Description: Absence of new skin breakdown  6/22/2021 1606 by Dipti Garcia RN  Outcome: Ongoing  6/22/2021 0216 by Luis Hammer RN  Outcome: Ongoing     Problem: Falls - Risk of:  Goal: Will remain free from falls  Description: Will remain free from falls  6/22/2021 1606 by Dipti Garcia RN  Outcome: Ongoing  6/22/2021 0216 by Luis Hammer RN  Outcome: Ongoing  Goal: Absence of physical injury  Description: Absence of physical injury  6/22/2021 1606 by Dipti Garcia RN  Outcome: Ongoing  6/22/2021 0216 by Luis Hammer RN  Outcome: Ongoing     Problem: Nutrition  Goal: Optimal nutrition therapy  6/22/2021 1606 by Dipti Garcia RN  Outcome: Ongoing  6/22/2021 0216 by Luis Hammer RN  Outcome: Ongoing     Problem: Pain:  Goal: Pain level will decrease  Description: Pain level will decrease  6/22/2021 1606 by Dipti Garcia RN  Outcome: Ongoing  6/22/2021 0216 by Luis Hammer RN  Outcome: Ongoing  Goal: Control of acute pain  Description: Control of acute pain  6/22/2021 1606 by Dipti Garcia RN Outcome: Ongoing  6/22/2021 0216 by Smamie Ruiz RN  Outcome: Ongoing  Goal: Control of chronic pain  Description: Control of chronic pain  6/22/2021 1606 by Gleen Alpers, RN  Outcome: Ongoing  6/22/2021 0216 by Sammie Ruiz RN  Outcome: Ongoing     Problem: IP BALANCE  Goal: BALANCE EDUCATION  Description: Educate patients on maintaining dynamic/static standing/sitting balance, with/without upper extremity support.   Outcome: Ongoing     Problem: IP MOBILITY  Goal: LTG - patient will ambulate household distance  Outcome: Ongoing

## 2021-06-22 NOTE — PROGRESS NOTES
Secure message sent to Dr. Nicky Varghese regarding the patients son, Yovani, requesting to speak to the neurologist.     Dr Nicky Varghese responded and stated \"Ok thanks. Will let doc caring for patient know. \"

## 2021-06-23 ENCOUNTER — APPOINTMENT (OUTPATIENT)
Dept: CT IMAGING | Age: 56
DRG: 100 | End: 2021-06-23
Payer: COMMERCIAL

## 2021-06-23 LAB
ANION GAP SERPL CALCULATED.3IONS-SCNC: 7 MMOL/L (ref 9–17)
BUN BLDV-MCNC: 13 MG/DL (ref 6–20)
BUN/CREAT BLD: ABNORMAL (ref 9–20)
CALCIUM SERPL-MCNC: 8.3 MG/DL (ref 8.6–10.4)
CHLORIDE BLD-SCNC: 106 MMOL/L (ref 98–107)
CO2: 28 MMOL/L (ref 20–31)
CREAT SERPL-MCNC: 0.37 MG/DL (ref 0.5–0.9)
GFR AFRICAN AMERICAN: >60 ML/MIN
GFR NON-AFRICAN AMERICAN: >60 ML/MIN
GFR SERPL CREATININE-BSD FRML MDRD: ABNORMAL ML/MIN/{1.73_M2}
GFR SERPL CREATININE-BSD FRML MDRD: ABNORMAL ML/MIN/{1.73_M2}
GLUCOSE BLD-MCNC: 104 MG/DL (ref 70–99)
HCT VFR BLD CALC: 31.5 % (ref 36.3–47.1)
HEMOGLOBIN: 8.5 G/DL (ref 11.9–15.1)
MAGNESIUM: 1.8 MG/DL (ref 1.6–2.6)
MCH RBC QN AUTO: 22.5 PG (ref 25.2–33.5)
MCHC RBC AUTO-ENTMCNC: 27 G/DL (ref 28.4–34.8)
MCV RBC AUTO: 83.6 FL (ref 82.6–102.9)
NRBC AUTOMATED: 0 PER 100 WBC
PDW BLD-RTO: 28.7 % (ref 11.8–14.4)
PLATELET # BLD: ABNORMAL K/UL (ref 138–453)
PLATELET, FLUORESCENCE: 1172 K/UL (ref 138–453)
PLATELET, IMMATURE FRACTION: 1.2 % (ref 1.1–10.3)
PMV BLD AUTO: ABNORMAL FL (ref 8.1–13.5)
POTASSIUM SERPL-SCNC: 4.1 MMOL/L (ref 3.7–5.3)
RBC # BLD: 3.77 M/UL (ref 3.95–5.11)
SODIUM BLD-SCNC: 141 MMOL/L (ref 135–144)
WBC # BLD: 15 K/UL (ref 3.5–11.3)

## 2021-06-23 PROCEDURE — 83735 ASSAY OF MAGNESIUM: CPT

## 2021-06-23 PROCEDURE — 85055 RETICULATED PLATELET ASSAY: CPT

## 2021-06-23 PROCEDURE — 36415 COLL VENOUS BLD VENIPUNCTURE: CPT

## 2021-06-23 PROCEDURE — 6370000000 HC RX 637 (ALT 250 FOR IP): Performed by: INTERNAL MEDICINE

## 2021-06-23 PROCEDURE — 99232 SBSQ HOSP IP/OBS MODERATE 35: CPT | Performed by: INTERNAL MEDICINE

## 2021-06-23 PROCEDURE — 6370000000 HC RX 637 (ALT 250 FOR IP): Performed by: STUDENT IN AN ORGANIZED HEALTH CARE EDUCATION/TRAINING PROGRAM

## 2021-06-23 PROCEDURE — 6360000004 HC RX CONTRAST MEDICATION: Performed by: INTERNAL MEDICINE

## 2021-06-23 PROCEDURE — 2060000000 HC ICU INTERMEDIATE R&B

## 2021-06-23 PROCEDURE — 2580000003 HC RX 258: Performed by: STUDENT IN AN ORGANIZED HEALTH CARE EDUCATION/TRAINING PROGRAM

## 2021-06-23 PROCEDURE — 99231 SBSQ HOSP IP/OBS SF/LOW 25: CPT | Performed by: INTERNAL MEDICINE

## 2021-06-23 PROCEDURE — 99233 SBSQ HOSP IP/OBS HIGH 50: CPT | Performed by: FAMILY MEDICINE

## 2021-06-23 PROCEDURE — 94761 N-INVAS EAR/PLS OXIMETRY MLT: CPT

## 2021-06-23 PROCEDURE — 74177 CT ABD & PELVIS W/CONTRAST: CPT

## 2021-06-23 PROCEDURE — 80048 BASIC METABOLIC PNL TOTAL CA: CPT

## 2021-06-23 PROCEDURE — 97535 SELF CARE MNGMENT TRAINING: CPT

## 2021-06-23 PROCEDURE — 85027 COMPLETE CBC AUTOMATED: CPT

## 2021-06-23 PROCEDURE — 99232 SBSQ HOSP IP/OBS MODERATE 35: CPT | Performed by: PSYCHIATRY & NEUROLOGY

## 2021-06-23 PROCEDURE — 6360000002 HC RX W HCPCS: Performed by: INTERNAL MEDICINE

## 2021-06-23 PROCEDURE — 97166 OT EVAL MOD COMPLEX 45 MIN: CPT

## 2021-06-23 RX ORDER — LAMOTRIGINE 25 MG/1
25 TABLET ORAL DAILY
Qty: 30 TABLET | Refills: 1 | Status: SHIPPED | OUTPATIENT
Start: 2021-06-24 | End: 2021-07-09 | Stop reason: SDUPTHER

## 2021-06-23 RX ORDER — AMOXICILLIN AND CLAVULANATE POTASSIUM 875; 125 MG/1; MG/1
1 TABLET, FILM COATED ORAL EVERY 12 HOURS SCHEDULED
Qty: 8 TABLET | Refills: 0 | Status: SHIPPED | OUTPATIENT
Start: 2021-06-23 | End: 2021-06-27

## 2021-06-23 RX ORDER — OXYCODONE HYDROCHLORIDE AND ACETAMINOPHEN 5; 325 MG/1; MG/1
1 TABLET ORAL EVERY 6 HOURS PRN
Qty: 20 TABLET | Refills: 0 | Status: SHIPPED | OUTPATIENT
Start: 2021-06-23 | End: 2021-06-28

## 2021-06-23 RX ORDER — ONDANSETRON 4 MG/1
4 TABLET, ORALLY DISINTEGRATING ORAL EVERY 8 HOURS PRN
Qty: 21 TABLET | Refills: 0 | Status: SHIPPED | OUTPATIENT
Start: 2021-06-23 | End: 2021-12-03 | Stop reason: SDUPTHER

## 2021-06-23 RX ORDER — LAMOTRIGINE 25 MG/1
25 TABLET ORAL DAILY
Status: DISCONTINUED | OUTPATIENT
Start: 2021-06-23 | End: 2021-06-24 | Stop reason: HOSPADM

## 2021-06-23 RX ORDER — LEVETIRACETAM 500 MG/1
1500 TABLET ORAL 2 TIMES DAILY
Status: DISCONTINUED | OUTPATIENT
Start: 2021-06-23 | End: 2021-06-24 | Stop reason: HOSPADM

## 2021-06-23 RX ORDER — MECLIZINE HCL 12.5 MG/1
12.5 TABLET ORAL 3 TIMES DAILY PRN
Qty: 30 TABLET | Refills: 0 | Status: SHIPPED | OUTPATIENT
Start: 2021-06-23 | End: 2021-07-03

## 2021-06-23 RX ORDER — HEPARIN SODIUM (PORCINE) LOCK FLUSH IV SOLN 100 UNIT/ML 100 UNIT/ML
500 SOLUTION INTRAVENOUS ONCE
Status: COMPLETED | OUTPATIENT
Start: 2021-06-23 | End: 2021-06-23

## 2021-06-23 RX ADMIN — SERTRALINE 100 MG: 50 TABLET, FILM COATED ORAL at 08:34

## 2021-06-23 RX ADMIN — FAMOTIDINE 20 MG: 20 TABLET, FILM COATED ORAL at 21:23

## 2021-06-23 RX ADMIN — AMOXICILLIN AND CLAVULANATE POTASSIUM 1 TABLET: 875; 125 TABLET, FILM COATED ORAL at 21:23

## 2021-06-23 RX ADMIN — APIXABAN 5 MG: 5 TABLET, FILM COATED ORAL at 08:34

## 2021-06-23 RX ADMIN — IOHEXOL 50 ML: 240 INJECTION, SOLUTION INTRATHECAL; INTRAVASCULAR; INTRAVENOUS; ORAL at 09:19

## 2021-06-23 RX ADMIN — MORPHINE SULFATE 15 MG: 15 TABLET, EXTENDED RELEASE ORAL at 08:34

## 2021-06-23 RX ADMIN — LAMOTRIGINE 25 MG: 25 TABLET ORAL at 10:49

## 2021-06-23 RX ADMIN — HEPARIN 500 UNITS: 100 SYRINGE at 17:01

## 2021-06-23 RX ADMIN — AMOXICILLIN AND CLAVULANATE POTASSIUM 1 TABLET: 875; 125 TABLET, FILM COATED ORAL at 08:34

## 2021-06-23 RX ADMIN — MORPHINE SULFATE 15 MG: 15 TABLET, EXTENDED RELEASE ORAL at 21:22

## 2021-06-23 RX ADMIN — MECLIZINE HYDROCHLORIDE 12.5 MG: 12.5 TABLET, FILM COATED ORAL at 06:21

## 2021-06-23 RX ADMIN — IOPAMIDOL 75 ML: 755 INJECTION, SOLUTION INTRAVENOUS at 12:31

## 2021-06-23 RX ADMIN — LEVETIRACETAM 1000 MG: 500 TABLET, FILM COATED ORAL at 08:34

## 2021-06-23 RX ADMIN — OXYCODONE HYDROCHLORIDE AND ACETAMINOPHEN 1 TABLET: 5; 325 TABLET ORAL at 05:30

## 2021-06-23 RX ADMIN — OXYCODONE HYDROCHLORIDE AND ACETAMINOPHEN 1 TABLET: 5; 325 TABLET ORAL at 13:12

## 2021-06-23 RX ADMIN — LEVETIRACETAM 1500 MG: 500 TABLET, FILM COATED ORAL at 21:22

## 2021-06-23 RX ADMIN — FAMOTIDINE 20 MG: 20 TABLET, FILM COATED ORAL at 08:34

## 2021-06-23 RX ADMIN — FERROUS SULFATE TAB EC 325 MG (65 MG FE EQUIVALENT) 325 MG: 325 (65 FE) TABLET DELAYED RESPONSE at 08:34

## 2021-06-23 RX ADMIN — OXYCODONE HYDROCHLORIDE AND ACETAMINOPHEN 1 TABLET: 5; 325 TABLET ORAL at 17:25

## 2021-06-23 RX ADMIN — MECLIZINE HYDROCHLORIDE 12.5 MG: 12.5 TABLET, FILM COATED ORAL at 18:38

## 2021-06-23 RX ADMIN — SODIUM CHLORIDE, PRESERVATIVE FREE 10 ML: 5 INJECTION INTRAVENOUS at 08:36

## 2021-06-23 ASSESSMENT — PAIN SCALES - GENERAL
PAINLEVEL_OUTOF10: 3
PAINLEVEL_OUTOF10: 4
PAINLEVEL_OUTOF10: 8
PAINLEVEL_OUTOF10: 7

## 2021-06-23 ASSESSMENT — PAIN DESCRIPTION - LOCATION: LOCATION: BACK

## 2021-06-23 ASSESSMENT — PAIN DESCRIPTION - ORIENTATION: ORIENTATION: LOWER

## 2021-06-23 ASSESSMENT — PAIN DESCRIPTION - PAIN TYPE: TYPE: CHRONIC PAIN

## 2021-06-23 NOTE — PROGRESS NOTES
Physical Therapy        Physical Therapy Cancel Note      DATE: 2021    NAME: Nicole Boudreaux  MRN: [id number]   : 1965      Patient not seen this date for Physical Therapy due to:    Patient Declined: pt refusal, pt reporting significant frustration this AM. RN aware. PT will check back 21.        Electronically signed by Shamika Gillette PT on 2021 at 11:10 AM

## 2021-06-23 NOTE — PROGRESS NOTES
Occupational Therapy   Occupational Therapy Initial Assessment  Date: 2021   Patient Name: Faviola Noe  MRN: [id number]     : 1965    Date of Service: 2021     Chief Complaint   Patient presents with    Cerebrovascular Accident     brought in per EMS      Copied from chart: 67-year-old female past medical history of recurrent ovarian cancer originally diagnosed in , thrombocytosis, depression, prior history of DVT in 2021, iron deficiency anemia presented on 2021 due to altered mental status.  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 Earnstine Delscar was intubated due to concern of status epilepticus and required airway protection on 2021. Earnstine Delude was extubated on 2021. Garold Inoue dose was increased to 1000 mg twice a day.  Patient has been followed by hematology oncology as well and also has been transfused 1 unit of PRBCs on 2021. \"     Discharge Recommendations:  Patient would benefit from continued therapy after discharge  OT Equipment Recommendations  Equipment Needed: Yes  Mobility Devices: ADL Assistive Devices  ADL Assistive Devices: Shower Chair with back;Grab Bars - shower    Assessment   Performance deficits / Impairments: Decreased functional mobility ; Decreased ADL status; Decreased endurance;Decreased high-level IADLs  Assessment: Pt demonstrated functional transfers, functional mobility and ADL tasks this date with SBA for safety. Pt limited this date by decreased endurance, fatiguing quickly throughout. Reports it has been increasingly difficult to complete ADL tasks without significant rest at home. P tis expected to benefit from skilled OT services during her acute hospitilization stay to address the above noted deficits to promote increased endurance and independence in ADLs, IADLs and functional mobility tasks.   Prognosis: Good  Decision Making: Medium Complexity  Patient Education: Pt educated on OT role, OT poc, activity promotion, energy conservation technique (use of rest breaks and pursed lip breathing), safety at home/home setup, DME-good return from pt and family  REQUIRES OT FOLLOW UP: Yes  Activity Tolerance  Activity Tolerance: Patient Tolerated treatment well;Patient limited by fatigue  Safety Devices  Safety Devices in place: Yes  Type of devices: Gait belt;Left in bed;Call light within reach;Nurse notified  Restraints  Initially in place: No           Patient Diagnosis(es): The primary encounter diagnosis was Status epilepticus ([facility] [address] Diagnoses of Intractable low back pain and Cancer, metastatic to bone [facility]) were also pertinent to this visit. has a past medical history of Anemia, Bleeding, Cervical cancer ([facility] [address] Depression, Diabetes mellitus ([facility] [address] GERD (gastroesophageal reflux disease), Hx of blood clots, Hypertension, Metastatic cancer ([facility] [address] Ovarian cancer ([facility] [address] Post chemo evaluation, and Splenic lesion. has a past surgical history that includes Hysterectomy, total abdominal; Port Surgery; Tonsillectomy; IR PORT PLACEMENT > 5 YEARS (08/24/2020); Anus surgery; Abscess Drainage (2013); colectomy (03/2013); IR EMBOLIZATION HEMORRHAGE (10/05/2020); and Cardiac catheterization. Restrictions  Restrictions/Precautions  Restrictions/Precautions: General Precautions  Required Braces or Orthoses?: No  Position Activity Restriction  Other position/activity restrictions: Up with assistance    Subjective   General  Patient assessed for rehabilitation services?: Yes  Family / Caregiver Present: No  General Comment  Comments: RN ok'd for OT eval this AM. Pt agreeable to session, pleasent/cooperative throughout.   Patient Currently in Pain: Yes  Pain Assessment  Pain Assessment: 0-10  Pain Level: 7  Pain Type: Chronic pain  Pain Location: Back  Pain Orientation: Lower  Non-Pharmaceutical Pain Intervention(s): Ambulation/Increased Activity  Response to Pain Intervention: Patient Satisfied  Vital Signs  Patient Currently in Pain: Yes     Social/Functional History  Social/Functional History  Lives With: Son  Type of Home: House  Home Layout: Two level  Home Access: Stairs to enter without rails  Entrance Stairs - Number of Steps: 4  Bathroom Shower/Tub: Tub/Shower unit, Shower chair without back  Bathroom Toilet: Standard  Bathroom Equipment:  (shower stool only, but pt and family reports they would feel safer with rails and a back to the chair)  Home Equipment: Standard walker (Has standard walker but does not use at baseline)  ADL Assistance: Needs assistance (Daughter assists with bathing, only for supervision/safety.)  Homemaking Assistance: Needs assistance (Son and 2 daughters assist to complete all household tasks)  Homemaking Responsibilities: No  Ambulation Assistance: Independent  Transfer Assistance: Independent  Active : No  Patient's  Info: Children drive  Leisure & Hobbies: be outside in nature  Additional Comments: Denies any recent falls. Reports limited gait distances recently due to \"heart races\" and shortness of breath. Objective   Vision: Impaired (Does not have to wear for close up)  Vision Exceptions: Wears glasses at all times  Hearing: Within functional limits    Orientation  Overall Orientation Status: Within Functional Limits     Balance  Sitting Balance: Supervision (Sitting EOB and on the toilet unsupported ~6 minutes)  Standing Balance: Stand by assistance  Standing Balance  Time: ~4 minutes  Activity: static standing EOB/toilet, standing for grooming tasks sinkside  Functional Mobility  Functional - Mobility Device: No device  Activity: To/from bathroom  Assist Level: Stand by assistance  Functional Mobility Comments: Pt ambulated EOB<>bathroom with SBA for safety. No LOB or unsteadiness; however, pt significantly fatigued after mobility back to bed. Pt reports having difficulty with endurance at home recently, often requiring rest breaks.   Toilet Transfers Toilet - Technique: Ambulating  Equipment Used: Standard toilet  Toilet Transfer: Stand by assistance  Toilet Transfers Comments: No use of grab bars to mimic homesetup  ADL  Feeding: Independent;Setup  Grooming: Stand by assistance;Setup (Pt stood sinkside with SBA for safety to complete oral hygiene and hand hygiene)  UE Bathing: Stand by assistance;Setup; Increased time to complete  LE Bathing: Stand by assistance;Setup; Increased time to complete  UE Dressing: Stand by assistance  LE Dressing: Stand by assistance  Toileting: Stand by assistance (Pt demonstrated toilet transfer, clothing management and pericare with SBA for safety)  Additional Comments: Pt limited this date by decreased endurance reporting significant fatigue after activities in bathroom.      Tone RUE  RUE Tone: Normotonic  Tone LUE  LUE Tone: Normotonic  Coordination  Movements Are Fluid And Coordinated: Yes     Bed mobility  Supine to Sit: Modified independent  Sit to Supine: Modified independent  Comment: HOB slightly elevated     Transfers  Sit to stand: Stand by assistance  Stand to sit: Stand by assistance  Transfer Comments: No AD     Cognition  Overall Cognitive Status: WFL        Sensation  Overall Sensation Status: WFL (Denies any numbness and tingling)        LUE AROM (degrees)  LUE AROM : WFL  Left Hand AROM (degrees)  Left Hand AROM: WFL  RUE AROM (degrees)  RUE AROM : WFL  Right Hand AROM (degrees)  Right Hand AROM: WFL  LUE Strength  Gross LUE Strength: WFL  L Hand General: 4+/5  LUE Strength Comment: Grossly 4+/5  RUE Strength  Gross RUE Strength: WFL  R Hand General: 4+/5  RUE Strength Comment: Grossly 4+/5                   Plan   Plan  Times per week: 2-3x/week  Current Treatment Recommendations: Safety Education & Training, Balance Training, Patient/Caregiver Education & Training, Self-Care / ADL, Functional Mobility Training, Home Management Training, Endurance Training           AM-PAC Score        AM-PAC Inpatient Daily Activity Raw Score: 19 (06/23/21 1540)  AM-PAC Inpatient ADL T-Scale Score : 40.22 (06/23/21 1540)  ADL Inpatient CMS 0-100% Score: 42.8 (06/23/21 1540)  ADL Inpatient CMS G-Code Modifier : CK (06/23/21 1540)    Goals  Short term goals  Time Frame for Short term goals: By discharge, pt will:  Short term goal 1: Demo functional transfers and functional mobility IND  Short term goal 2: Demo +15 minutes of standing tolerance during ADL/functional task with <1 minute restbreak PRN  Short term goal 3: Demo +25 minutes of activity tolerance with <2 1 minute restbreaks PRN to promote increased endurance for increased engagement in ADLs/IADLs  Short term goal 4: Demo use of energy conservation techniques as needed throughout all functional mobility/ADL tasks with 0 VCs  Short term goal 5: Demo ADLs with Mod I, use of AE/DME PRN       Therapy Time   Individual Concurrent Group Co-treatment   Time In 1413         Time Out 1436         Minutes 23         Timed Code Treatment Minutes: 15 Minutes       Ma Filter, OTR/L

## 2021-06-23 NOTE — PROGRESS NOTES
[facility]  Office: [address], DO, Michela Giles, DO, Heydi Morel, DO, Sampson Daltonwood Blood, DO, Marilee Santos MD, Ashley Pedersen MD, Layne Martinez MD, Karely Bae MD, Evy Barber MD, Deven Zamudio MD, Patrice Lopez MD, Navya Sanders MD, Jared Sherwood DO, Tasia Mejias MD, Diann Novoa DO, Kylah Wilhelm MD,  Bang Miles DO, Huber Shepard MD, Deyanira Colunga MD, Carmine Mathis MD, Robin Turcios MD, Rosamaria Rodriguez, Leah Villasenor, CNP, Jorge Quevedo, CNP, Michele Samuels, CNS, Carla Majano, CNP, Simin Aburto, CNP, Pooja Bose, CNP, Shruti Shen, CNP, Sunday Thierry, CNP, Cristela Mcguire PA-C, Joaquim Essex, [facility], Atul Mcfadden, CNP, Tray Velasquez, CNP, Simona Alvarado, CNP, Murtaugh Lawanda, CNP, Pritesh Pena, CNP, Azeem Parra, [address] [place] [address]    Progress Note    6/23/2021    11:37 AM    Name:   Sneha Buchanan  MRN:     [id number]     Acct:      [de-identified]   Room:   [address] [place] [address] Day:  5  Admit Date:  6/18/2021  9:17 AM    PCP:   Lisandro Orellana MD  Code Status:  Full Code    Subjective:     C/C:   Chief Complaint   Patient presents with    Cerebrovascular Accident     brought in per EMS      Interval History Status: unchanged. Pt is still c/o mild \"dizziness\" and lightheadedness. She has been up with PT/OT and doing well. She denies any cough. She is using the incentive spirometer now. Son and daughter and present. Son wants to know why her WBC is still 15. Brief History:     Per my partner:  \"60year-old female past medical history of recurrent ovarian cancer originally diagnosed in 2005, thrombocytosis, depression, prior history of DVT in March 2021, iron deficiency anemia presented on 6/18/2021 due to altered mental status. Patient does have a history of ID ES syndrome and has been on Keppra per EMR has been taking.   Patient was intubated due to concern of status epilepticus and required airway protection on 6/18/2021. Patient was extubated on 6/19/2021. Keppra dose was increased to 1000 mg twice a day. Patient has been followed by hematology oncology as well and also has been transfused 1 unit of PRBCs on 6/20/2021. \"    Review of Systems:     Constitutional:  negative for chills, fevers, sweats  Respiratory:  negative for cough, dyspnea on exertion, shortness of breath, wheezing  Cardiovascular:  negative for chest pain, chest pressure/discomfort, lower extremity edema, palpitations  Gastrointestinal:  negative for abdominal pain, constipation, diarrhea, nausea, vomiting  Neurological:  Positive for dizziness, no headache    Medications: Allergies: Allergies   Allergen Reactions    Ceftriaxone      Had a rash on ceftriaxone December 2020, [facility] [facility]       Current Meds:   Scheduled Meds:    lamoTRIgine  25 mg Oral Daily    levETIRAcetam  1,500 mg Oral BID    famotidine  20 mg Oral BID    amoxicillin-clavulanate  1 tablet Oral 2 times per day    ferrous sulfate  325 mg Oral Daily with breakfast    sertraline  100 mg Oral Daily    morphine  15 mg Oral 2 times per day    apixaban  5 mg Oral Daily    sodium chloride flush  5-40 mL Intravenous 2 times per day    aspirin  324 mg Oral Once     Continuous Infusions:    sodium chloride      sodium chloride       PRN Meds: meclizine, sodium chloride, oxyCODONE-acetaminophen, sodium chloride flush, sodium chloride, ondansetron **OR** ondansetron, polyethylene glycol, acetaminophen **OR** acetaminophen, fentanNYL    Data:     Past Medical History:   has a past medical history of Anemia, Bleeding, Cervical cancer ([facility] [address] Depression, Diabetes mellitus ([address] GERD (gastroesophageal reflux disease), Hx of blood clots, Hypertension, Metastatic cancer ([address] Ovarian cancer ([address] Post chemo evaluation, and Splenic lesion. Social History:   reports that she has been smoking cigarettes.  She has been smoking about 1.00 pack per day. She uses smokeless tobacco. She reports previous alcohol use. She reports that she does not use drugs. Family History:   Family History   Problem Relation Age of Onset    Alcohol Abuse Mother     Cirrhosis Mother        Vitals:  BP (!) 125/53   Pulse 74   Temp 98.7 °F (37.1 °C) (Oral)   Resp 16   Ht 5' 2\" (1.575 m)   Wt 160 lb 15 oz (73 kg)   SpO2 97%   BMI 29.44 kg/m²   Temp (24hrs), Av.9 °F (37.2 °C), Min:98.7 °F (37.1 °C), Max:99.1 °F (37.3 °C)    No results for input(s): POCGLU in the last 72 hours. I/O (24Hr):   No intake or output data in the 24 hours ending 21 1137    Labs:  Hematology:  Recent Labs     21  0501   WBC 14.8* 15.6* 15.0*   RBC 3.61* 3.60* 3.77*   HGB 8.0* 7.9* 8.5*   HCT 29.9* 29.7* 31.5*   MCV 82.8 82.5* 83.6   MCH 22.2* 21.9* 22.5*   MCHC 26.8* 26.6* 27.0*   RDW 25.6* 26.8* 28.7*   PLT See Reflexed IPF Result See Reflexed IPF Result See Reflexed IPF Result   MPV NOT REPORTED NOT REPORTED NOT REPORTED     Chemistry:  Recent Labs     21  0501    142 141   K 3.7 4.0 4.1    106 106   CO2 27 24 28   GLUCOSE 84 90 104*   BUN 4* 8 13   CREATININE 0.26* 0.32* 0.37*   MG 2.2 1.8 1.8   ANIONGAP 7* 12 7*   LABGLOM >60 >60 >60   GFRAA >60 >60 >60   CALCIUM 8.4* 8.5* 8.3*   PHOS 2.7 3.6  --      Recent Labs     21   PROT 5.6* 5.7*   LABALBU 3.0* 3.1*   AST 10 25   ALT <5* 5   ALKPHOS 50 51   BILITOT <0.10* <0.10*     ABG:  Lab Results   Component Value Date    POCPH 7.426 2021    POCPCO2 42.5 2021    POCPO2 98.7 2021    POCHCO3 28.0 2021    NBEA NOT REPORTED 2021    PBEA 3 2021    UGG1VLV NOT REPORTED 2021    VBTE6JRS 98 2021    FIO2 30.0 2021     Lab Results   Component Value Date/Time    SPECIAL R HAND 20ML 2021 09:32 AM     Lab Results   Component Value Date/Time    CULTURE NO GROWTH 2 DAYS 06/21/2021 09:32 AM       Radiology:  CT Head WO Contrast    Result Date: 6/18/2021  No acute intracranial process identified. The findings were sent to the Radiology Results [address] at 9:41 am on 6/18/2021to be communicated to the Stroke Neurology service. XR CHEST PORTABLE    Result Date: 6/21/2021  Left basilar patchy opacities, probably atelectatic with a small pleural effusion. Developing infiltrate at the left base should be considered. Elevated right hemidiaphragm with probable right medial basilar atelectasis. Cardiomegaly. Otherwise stable findings. RECOMMENDATION: Short-term follow-up chest x-ray. XR CHEST PORTABLE    Result Date: 6/19/2021  No significant change in chest findings. XR CHEST PORTABLE    Result Date: 6/18/2021  Support tubes satisfactory. Similar enlarged susan (known adenopathy), scattered parenchymal densities (known pulmonary metastases) without significant interval change; probable smaller left pleural effusion. CTA HEAD NECK W CONTRAST    Result Date: 6/18/2021  1. No large vessel occlusion, significant stenosis or cerebral aneurysm identified. 2. No significant arterial stenosis identified within the neck. 3. Several foci of gas lucency within the lower left neck. Correlation with any history of attempted left central venous catheter placement is recommended. MRI LIMITED BRAIN    Result Date: 6/18/2021  Mild chronic white matter changes mild volume loss.  No evidence for restricted diffusion to suggest acute infarct       Physical Examination:        General appearance:  alert, cooperative and no distress  Mental Status:  oriented to person, place and time and normal affect  Lungs:  clear to auscultation bilaterally, normal effort  Heart:  regular rate and rhythm, no murmur  Abdomen:  soft, nontender, nondistended, normal bowel sounds, no masses, hepatomegaly, splenomegaly  Extremities:  no edema, redness, tenderness in the calves  Skin: Left Facial eschar, healing    Assessment:        Hospital Problems         Last Modified POA    * (Principal) Status epilepticus ([address] 6/20/2021 Yes    Malignant neoplasm of ovary ([address] 6/20/2021 Yes    Type 2 diabetes mellitus without complication, without long-term current use of insulin ([address] 6/20/2021 Yes    Hypochromic anemia 6/20/2021 Yes    PRES (posterior reversible encephalopathy syndrome) 6/20/2021 Yes    History of ovarian cancer 6/20/2021 Yes    Chronic deep vein thrombosis (DVT) of femoral vein of left lower extremity ([address] 6/22/2021 Yes    Thrombocytosis ([address] 6/20/2021 Yes    History of deep venous thrombosis (DVT) of distal vein of left lower extremity: On Eliquis 6/20/2021 Yes    Pelvic mass 6/22/2021 Yes          Plan:        1. Status epilepticus- resolved. On Keppra 1000 mg BID and Lamictal per Neurology. Dw Dr. Lillian Mccartney who has cleared her for discharge  2. Ovarian cancer- chemotherapy on hold per Hem/Onc, pain meds controlling pain. Son spoke to Palliative care. CT abd/ pelvis ordered  3. PRES  4. Anemia- iron deficiency- s/p 1 unit PRBCs, on iron supplement, monitor H/H  5. Thrombocytosis- on ASA, likely reactive  6. Chronic DVT left LE- on Eliquis  7. DM2- last HbA1C: 6 in Jan 2021, on dietary supplements   8. Lightheadedness-  Tavo madrigal, try Meclizine prn, slowly improving  9. Left lung atelectasis- incentive spirometer q2 hrs while awake, on Augmentin for suspected pneumonia, CXR negative except for small effusion and/or atelectasis. Son is insisting that she continue the Augmentin since her WBC count is still elevated  10. Elevated troponins- may be from seizures? Echo- normal LV function, EF 55%  11. Gi proph- Pepcid BID  12.  PT/OT    DC planning- Sade signed, Home with home PT/OT later this afternoon or early Thursday morning    Dinah Martel MD  6/23/2021  11:37 AM

## 2021-06-23 NOTE — CARE COORDINATION
Spoke to patient who is A&O and daughter at bedside. Patient has agreed to [facility]. list provided for choice of agency. She states that she will look at list and let me know. Therapy was in room getting ready for work with patient. Once therapy is completed their therapy, will address palliative care as OP with patient.   Son [facility]) is asking referral, but will need to discuss with patient

## 2021-06-23 NOTE — PROGRESS NOTES
Palliative Care Progress Note    NAME:  Alexis Larsen [facility]  MEDICAL RECORD NUMBER:  [id number]  AGE: 60 y.o. GENDER: female  : 1965  TODAY'S DATE:  2021    Reason for Consult:  goals of care and emotional and family support  History of Present Illness     The patient is a 60 y.o. Non-/non  female who presents with Cerebrovascular Accident (brought in per EMS )      Referred to Palliative Care by  [x] Physician   [] Nursing  [] Family Request   [] Other:       She was admitted to the internal medicine service for Status epilepticus ([address] Marylene Kill. Her hospital course has been associated with dizziness the patient has a complicated medical history and has been hospitalized since 2021  9:17 AM.    OVERNIGHT EVENTS:  No acute events overnight  Patient hemodynamically stable  Afebrile     BP (!) 125/53   Pulse 74   Temp 98.7 °F (37.1 °C) (Oral)   Resp 16   Ht 5' 2\" (1.575 m)   Wt 160 lb 15 oz (73 kg)   SpO2 97%   BMI 29.44 kg/m²     Past Medical History:   Diagnosis Date    Anemia     Bleeding 10/2020    intra-abdominal bleeding -due to splenic mass with GI infiltration. Status post embolization    Cervical cancer ([address]     Depression     Diabetes mellitus ([address]     GERD (gastroesophageal reflux disease)     Hx of blood clots     Hypertension     Metastatic cancer ([address] 10/2020    extensive intraabdominal and splenic involvement and lung mets.     Ovarian cancer ([facility] [address]     low grade serous ovarian carcinoma    Post chemo evaluation     2007: Chemo via med onc (Dr. Audrey Kurtz), 2008: Kennieth Saliva due to rising CA-125, 2013: intraperitoneal chemo,2015: Ca125 - 25     Splenic lesion        Family History   Problem Relation Age of Onset    Alcohol Abuse Mother     Cirrhosis Mother        Social History     Tobacco Use    Smoking status: Current Every Day Smoker     Packs/day: 1.00     Types: Cigarettes    Smokeless tobacco: Current User   Vaping Use    Vaping Use: Never used Substance Use Topics    Alcohol use: Not Currently    Drug use: Never         Assessment        REVIEW OF SYSTEMS    []   UNABLE TO OBTAIN:     Constitutional:  []   Chills   [x]  Fatigue   []  Fevers   []  Malaise   []  Weight loss   [x] Other: Tiredness    Respiratory:   []  Cough    []  Shortness of breath    []  Chest pain    [] Other:     Cardiovascular:   []  Chest pain  []  Dyspnea    []  Exertional chest pressure/discomfort     [x] Fatigue      []  Palpitations    []  Syncope   [] Other:     Gastrointestinal:   []  Abdominal pain   []  Constipation    []  Diarrhea    []   Dysphagia   []  Reflux             []  Vomiting   [] Other:     Genitourinary:  []  Dysuria     []  Frequency   []  Hematuria   [] Nocturia   []  Urinary incontinence   [] Other:     Musculoskeletal:   [] Back pain    []  Muscle weakness   []  Myalgias    []  Neck pain   []  Stiff joints   []  Other:     Behavioral/Psych:   [] Anxiety    []  Depression     []  Mood swings   [] Other:     PHYSICAL ASSESSMENT:     General: [x]  Oriented x3      [] well appearing      [] Intubated      [] ill appearing      [] Other:    Mental Status: [x] normal mental status exam      [] drowsy      [] Confused      [] Other:     Cardiovascular: [x]  Regular rate/rhythm      [] Arrhythmia      [] Other:     Chest: [x] Effort normal      [] lungs clear      [] respiratory distress      [] Tachypnea      []  Other:    Abdomen: [] Soft/non-tender      [x]  Normal appearance      [] Distended      [] Ascites      [] Other:    Neurological: [x] Normal Speech      [] Normal Sensation      []  Deficits present:      Extremity:  [x] normal skin color/temp      [] clubbing/cyanosis      []  No edema      [] Other:     Palliative Performance Scale:  ___60%  Ambulation reduced; Significant disease; Can't do hobbies/housework; intake normal or reduced; occasional assist; LOC full/confusion  ___50%  Mainly sit/lie;  Extensive disease; Can't do any work; Considerable MD Ashley  Palliative Care Team  on 6/23/2021 at 12:31 PM    Palliative care office: [phone]

## 2021-06-23 NOTE — PLAN OF CARE
Problem: OXYGENATION/RESPIRATORY FUNCTION  Goal: Patient will maintain patent airway  Outcome: Ongoing  Goal: Patient will achieve/maintain normal respiratory rate/effort  Description: Respiratory rate and effort will be within normal limits for the patient  Outcome: Ongoing     Problem: SKIN INTEGRITY  Goal: Skin integrity is maintained or improved  Outcome: Ongoing     Problem: Skin Integrity:  Goal: Will show no infection signs and symptoms  Description: Will show no infection signs and symptoms  Outcome: Ongoing  Goal: Absence of new skin breakdown  Description: Absence of new skin breakdown  Outcome: Ongoing     Problem: Falls - Risk of:  Goal: Will remain free from falls  Description: Will remain free from falls  Outcome: Ongoing  Goal: Absence of physical injury  Description: Absence of physical injury  Outcome: Ongoing     Problem: Nutrition  Goal: Optimal nutrition therapy  Outcome: Ongoing     Problem: Pain:  Goal: Pain level will decrease  Description: Pain level will decrease  Outcome: Ongoing  Goal: Control of acute pain  Description: Control of acute pain  Outcome: Ongoing  Goal: Control of chronic pain  Description: Control of chronic pain  Outcome: Ongoing     Problem: IP BALANCE  Goal: BALANCE EDUCATION  Description: Educate patients on maintaining dynamic/static standing/sitting balance, with/without upper extremity support.   Outcome: Ongoing     Problem: IP MOBILITY  Goal: LTG - patient will ambulate household distance  Outcome: Ongoing

## 2021-06-23 NOTE — PROGRESS NOTES
[id number] [facility]   [place] [address]      Neurology progress note            Date:   6/23/2021  Patient name:  Ankit Balck  Date of admission:  6/18/2021  YOB: 1965      Chief Complaint:     Chief Complaint   Patient presents with    Cerebrovascular Accident     brought in per EMS        Reason for Consult:      Status epilepticus    History of Present Illness:      The patient is a 60 y.o. 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,   In route by EMS patient had an episode of seizure-like activity administered 2 mg of Versed, resolution of seizure-like activity, patient was on Eliquis for prophylaxis secondary to her high risk of venous thrombosis secondary to known cancer,, patient in the emergency department had 2 episodes of generalized tonic-clonic seizures which aborted by 2 mg of Ativan patient was loaded with 1 g of Keppra and she subsequently underwent an MRI of the negative CT and CTA head and neck, MRI was also negative for any infarct, patient was intubated then after stabilizing her case patient got extubated on 3 L of nasal cannula oxygen, hemoglobin dropped to 6.8 secondary to chemotherapy, heme-onc is following, 1 unit of red blood cells has been administered, patient currently on 1000 g of Keppra twice daily,    There is a troponin elevation downtrending levels, EKG was unremarkable, patient is asymptomatic, elevated levels secondary to seizures, patient stabilized and transferred out of the ICU, under internal medicine primary team    Past Medical History:     Past Medical History:   Diagnosis Date    Anemia     Bleeding 10/2020 intra-abdominal bleeding -due to splenic mass with GI infiltration. Status post embolization    [facility]     Depression     Diabetes mellitus ([facility] [facility]     GERD (gastroesophageal reflux disease)     Hx of blood clots     Hypertension     Metastatic cancer ([facility] [address] [facility] 10/2020    extensive intraabdominal and splenic involvement and lung mets.  Ovarian cancer ([facility] [address]     low grade serous ovarian carcinoma    Post chemo evaluation     2007: Chemo via med onc (Dr. Santo Wagner), 2008: Mardell Luis due to rising CA-125, 2013: intraperitoneal chemo,12/2015: Ca125 - 25     Splenic lesion         Past Surgical History:     Past Surgical History:   Procedure Laterality Date    ABSCESS DRAINAGE  2013    Franca rectal    ANUS SURGERY      ANAL FISSURECTOMY    CARDIAC CATHETERIZATION      COLECTOMY  03/2013    ex lap, tumor debulking, transverse colectomy w reanastamosis, subgastric omentectomy, intraperitoneal port placement    HYSTERECTOMY, TOTAL ABDOMINAL      IR EMBOLIZATION HEMORRHAGE  10/05/2020    intra-abdominal bleeding -due to splenic mass with GI infiltration. Status post embolization [employer] interlock coils x7. mri condtional 3t ok, safe immediately post implant.  IR PORT PLACEMENT EQUAL OR GREATER THAN 5 YEARS  08/24/2020    IR PORT PLACEMENT EQUAL OR GREATER THAN 5 YEARS 8/24/2020 Norris Ambrocio MD [facility]    PORT SURGERY      IP Port    TONSILLECTOMY          Medications Prior to Admission:     Prior to Admission medications    Medication Sig Start Date End Date Taking? Authorizing Provider   morphine (MS CONTIN) 15 MG extended release tablet Take 1 tablet by mouth 2 times daily for 30 days.  6/7/21 7/7/21  Emily Nevarez MD   sertraline (ZOLOFT) 100 MG tablet Take 1 tablet by mouth daily 6/7/21 7/7/21  Pascale Bhatia MD   metoprolol succinate (TOPROL XL) 25 MG extended release tablet Take 1 tablet by mouth daily 6/7/21   Emily Nevarez MD   ELIQUIS 5 MG TABS tablet Take 1 tablet by mouth daily 21   Historical Provider, MD   pantoprazole (PROTONIX) 40 MG tablet Take 1 tablet by mouth 2 times daily 5/10/21   Juli Duke MD   levETIRAcetam (KEPPRA) 750 MG tablet Take 2 tablets by mouth 2 times daily 5/3/21   Juli Duke MD   ferrous sulfate (FE TABS 325) 325 (65 Fe) MG EC tablet Take 1 tablet by mouth daily (with breakfast) 3/12/21   ESTRELLITA Mcelroy - NP   metFORMIN (GLUCOPHAGE-XR) 500 MG extended release tablet Take 2 tablets by mouth twice daily 21   Juli Duke MD   aspirin 81 MG chewable tablet Take 81 mg by mouth nightly Pt not taking    Historical Provider, MD        Allergies:     Ceftriaxone    Social History:     Tobacco:    reports that she has been smoking cigarettes. She has been smoking about 1.00 pack per day. She uses smokeless tobacco.  Alcohol:      reports previous alcohol use. Drug Use:  reports no history of drug use.     Family History:     Family History   Problem Relation Age of Onset    Alcohol Abuse Mother     Cirrhosis Mother        Review of Systems:       Constitutional Negative for fever and chills   HEENT Negative for ear discharge, ear pain, nosebleed   Eyes Negative for photophobia, pain and discharge   Respiratory Negative for hemoptysis and sputum   Cardiovascular Negative for orthopnea, claudication and PND   Gastrointestinal Negative for abdominal pain, diarrhea, blood in stool   Musculoskeletal Negative for joint pain, negative for myalgia   Skin Negative for rash or itching   hematology Negative for ecchymosis, anemia   Psychiatric Negative for suicidal ideation, anxiety, depression, hallucinations       Physical Exam:   BP (!) 125/53   Pulse 77   Temp 98.7 °F (37.1 °C) (Oral)   Resp 15   Ht 5' 2\" (1.575 m)   Wt 160 lb 15 oz (73 kg)   SpO2 96%   BMI 29.44 kg/m²   Temp (24hrs), Av.6 °F (37 °C), Min:98 °F (36.7 °C), Max:99.1 °F (37.3 °C)        General examination:      General Appearance: alert, well appearing, and in no acute distress  HEENT: Normocephalic, atraumatic, moist mucus membranes  Neck: supple, no carotid bruits, (-) nuchal rigidity  Lungs:  Respirations unlabored, chest wall no deformity, BS normal  Cardiovascular: normal rate, regular rhythm  Abdomen: Soft, nontender, nondistended, normal bowel sounds  Skin: No gross lesions, rashes, bruising or bleeding on exposed skin area  Extremities:  peripheral pulses palpable, no cyanosis, clubbing or edema  Psych: normal affect      Neurological examination:      Mental status   Alert and oriented x 3; following all commands;   speech is fluent, no dysarthria, aphasia. Cranial nerves   II - visual fields intact to confrontation; pupils reactive  III, IV, VI  extraocular muscles intact; no JOSE A; no nystagmus; no ptosis   V - normal facial sensation                                                               VII - normal facial symmetry                                                             VIII - intact hearing                                                                             IX, X - symmetrical palate elevation                                               XI - symmetrical shoulder shrug                                                       XII - midline tongue without atrophy or fasciculation     Motor function  Strength:   5/5 RUE, 5/5 RLE  5/5 LUE, 5/5  LLE  Normal bulk and tone. Sensory function Intact to touch, pin, vibration, proprioception throughout     Cerebellar Intact finger-nose-finger testing. Intact heel-shin testing. No dysdiadochokinesia present. No tremors                        Reflex function 2/4 symmetric throughout . Downgoing plantar response bilaterally.  (-)Spear's sign bilaterally      Gait                  Not tested           Diagnostics:      Laboratory Testing:  CBC:   Recent Labs     06/21/21  0449 06/22/21  0414 06/23/21  0501   WBC 14.8* 15.6* 15.0*   HGB 8.0* 7.9* 8.5*   PLT VIEW OF THE CHEST 6/21/2021 10:50 am COMPARISON: Previous chest x-ray from 06/19/2021 HISTORY: ORDERING SYSTEM PROVIDED HISTORY: PNA rule out TECHNOLOGIST PROVIDED HISTORY: PNA rule out Cervical cancer, diabetes, GERD and hypertension. FINDINGS: Overlying ECG monitor leads and gown snaps. Stable right IJ chemo port with unchanged tip position. Coils splenic artery. Enlarged but unchanged appearing cardiac silhouette with a somewhat left ventricular configuration. Mediastinal structures appropriate for slight rotation to the left. Elevated right hemidiaphragm with perhaps medial right basilar atelectasis; additional bibasilar atelectatic appearing changes, greater left lateral base. Minimal infiltrate at the left base possible. .  Mild unchanged blunting left lateral costophrenic sulcus. No Kerley lines. Bones unchanged. Left basilar patchy opacities, probably atelectatic with a small pleural effusion. Developing infiltrate at the left base should be considered. Elevated right hemidiaphragm with probable right medial basilar atelectasis. Cardiomegaly. Otherwise stable findings. RECOMMENDATION: Short-term follow-up chest x-ray. XR CHEST PORTABLE    Result Date: 6/19/2021  EXAMINATION: ONE XRAY VIEW OF THE CHEST 6/19/2021 6:49 am COMPARISON: Chest June 18, 2021. HISTORY: ORDERING SYSTEM PROVIDED HISTORY: intubated TECHNOLOGIST PROVIDED HISTORY: intubated FINDINGS: Right-sided port is in place. ET enteric tubes are in satisfactory positions. Presumed coil embolization is seen involving the left upper quadrant. Heart and mediastinal structures appear unchanged. Left lower lobe atelectasis with small left pleural effusion is seen and appears unchanged. The right lung appears relatively clear. No pneumothorax or free air. No significant change in chest findings.      XR CHEST PORTABLE    Result Date: 6/18/2021  EXAMINATION: ONE XRAY VIEW OF THE CHEST 6/18/2021 10:22 am COMPARISON: AP chest from condition->Emergency Medical Condition (MA) FINDINGS: CTA NECK: AORTIC ARCH/ARCH VESSELS: No dissection or arterial injury. No significant stenosis of the brachiocephalic or subclavian arteries. CAROTID ARTERIES: No dissection, arterial injury, or hemodynamically significant stenosis by NASCET criteria. VERTEBRAL ARTERIES: No dissection, arterial injury, or significant stenosis. SOFT TISSUES: There are multiple foci of soft tissue gas within the lower left neck, likely related to prior central venous line attempt. A right chest Port-A-Cath is present. The lung apices are clear. There is no pathologically enlarged lymphadenopathy or soft tissue mass identified. BONES: No lytic or blastic osseous lesions are identified. There is a posterior disc osteophyte complex at C5-6 resulting in mild spinal canal stenosis and mild-to-moderate bilateral neural foraminal narrowing. 3D surface reformations were performed and concur with the above findings. CTA HEAD: ANTERIOR CIRCULATION: The intracranial segments of the internal carotid arteries are normal.  There is no significant stenosis or aneurysm identified. The anterior and middle cerebral arteries are normal in appearance. No significant stenosis or aneurysm is identified. POSTERIOR CIRCULATION: The distal vertebral arteries are normal in appearance. The basilar artery is normal.  No significant stenosis or aneurysm is identified. The posterior cerebral arteries are normal in appearance. OTHER: No dural venous sinus thrombosis on this non-dedicated study. BRAIN: There is no mass effect or midline shift. There is no vascular malformation identified. 3D surface reformations were performed and concur with the above findings. 1. No large vessel occlusion, significant stenosis or cerebral aneurysm identified. 2. No significant arterial stenosis identified within the neck. 3. Several foci of gas lucency within the lower left neck.   Correlation with any history of

## 2021-06-23 NOTE — PROGRESS NOTES
Today's Date: 6/23/2021  Patient Name: Ruth Purdy  Date of admission: 6/18/2021  9:17 AM  Patient's age: 60 y.o., 1965  Admission Dx: Status epilepticus ([address] [G40.901]    Reason for Consult: management recommendations  Requesting Physician: Bill Fiore MD    CHIEF COMPLAINT:      SUBJECTIVE:  . The patient was seen and examined. She is awake and conscious and oriented. Continues to feel dizzy. No new complaints. Looking towards discharge  BRIEF CASE HISTORY:    The patient is a 60 y.o.  female who is admitted to the hospital for evaluation of unresponsiveness and concern regarding seizures. Patient admitted to the hospital due to concerns regarding seizures. Patient was found unresponsive by her son. Patient does have history of seizures has been on Keppra. Patient was also recently admitted to the neurology service for crest syndrome. Patient was noted to have a episode of seizure in route with ems. Patient also takes Eliquis for history of blood clots. In the ER patient was noted to have 2 more episodes of seizures. Patient MRI brain as well as CTA were unremarkable in regards to any acute event patient is currently being worked up by neurology for status epilepticus    Patient is known to our service. She has history of recurrent ovarian cancer. Original diagnosis was in 2005. Patient has been treated with Doxil Avastin in the past.  More recently she is being treated with gemcitabine patient last chemotherapy treatment was on 6/4/2021. Patient received cycle 5-day 8 of Gemzar. Lab work-up shows platelet count of 1.3  WBC count is 15 hemoglobin is 8.9. Recent iron studies show ferritin of 9. Patient is intubated at the time of evaluation and undergoing EEG.     Past Medical History:   has a past medical history of Anemia, Bleeding, Cervical cancer ([address] Depression, Diabetes mellitus ([address] GERD (gastroesophageal reflux disease), Hx of blood clots, Hypertension, Metastatic cancer ([facility] [address] Ovarian cancer ([facility] [address] Post chemo evaluation, and Splenic lesion. Past Surgical History:   has a past surgical history that includes Hysterectomy, total abdominal; Port Surgery; Tonsillectomy; IR PORT PLACEMENT > 5 YEARS (08/24/2020); Anus surgery; Abscess Drainage (2013); colectomy (03/2013); IR EMBOLIZATION HEMORRHAGE (10/05/2020); and Cardiac catheterization. Medications:    Prior to Admission medications    Medication Sig Start Date End Date Taking? Authorizing Provider   oxyCODONE-acetaminophen (PERCOCET) 5-325 MG per tablet Take 1 tablet by mouth every 6 hours as needed for Pain (breakthrough pain) for up to 5 days. 6/23/21 6/28/21 Yes Kenny Freeman MD   amoxicillin-clavulanate (AUGMENTIN) 875-125 MG per tablet Take 1 tablet by mouth every 12 hours for 4 days 6/23/21 6/27/21 Yes Kenny Freeman MD   lamoTRIgine (LAMICTAL) 25 MG tablet Take 1 tablet by mouth daily 6/24/21  Yes Kenny Freeman MD   meclizine (ANTIVERT) 12.5 MG tablet Take 1 tablet by mouth 3 times daily as needed for Dizziness 6/23/21 7/3/21 Yes Kenny Freeman MD   ondansetron (ZOFRAN-ODT) 4 MG disintegrating tablet Take 1 tablet by mouth every 8 hours as needed for Nausea or Vomiting 6/23/21  Yes Kenny Freeman MD   morphine (MS CONTIN) 15 MG extended release tablet Take 1 tablet by mouth 2 times daily for 30 days.  6/7/21 7/7/21  Ora Closs, MD   sertraline (ZOLOFT) 100 MG tablet Take 1 tablet by mouth daily 6/7/21 7/7/21  Ora Closs, MD   ELIQUIS 5 MG TABS tablet Take 1 tablet by mouth daily 5/26/21   Historical Provider, MD   pantoprazole (PROTONIX) 40 MG tablet Take 1 tablet by mouth 2 times daily 5/10/21   Ora Closs, MD   levETIRAcetam (KEPPRA) 750 MG tablet Take 2 tablets by mouth 2 times daily 5/3/21   Ora Closs, MD   ferrous sulfate (FE TABS 325) 325 (65 Fe) MG EC tablet Take 1 tablet by mouth daily (with breakfast) 3/12/21   ESTRELLITA Palm NP 21 1414    polyethylene glycol (GLYCOLAX) packet 17 g  17 g Oral Daily PRN Jacqualin Jameser, DO        acetaminophen (TYLENOL) tablet 650 mg  650 mg Oral Q6H PRN Jacqualin Heckler, DO   650 mg at 21 1937    Or    acetaminophen (TYLENOL) suppository 650 mg  650 mg Rectal Q6H PRN Jacqualin Kaya, DO        fentaNYL (SUBLIMAZE) injection 100 mcg  100 mcg Intravenous Q1H PRN Leonardo Spann MD   100 mcg at 21 0551    aspirin chewable tablet 324 mg  324 mg Oral Once Nick Valerio DO           Allergies:  Ceftriaxone    Social History:   reports that she has been smoking cigarettes. She has been smoking about 1.00 pack per day. She uses smokeless tobacco. She reports previous alcohol use. She reports that she does not use drugs. Family History: family history includes Alcohol Abuse in her mother; Cirrhosis in her mother. REVIEW OF SYSTEMS:      Unable to obtain due to altered mental status  PHYSICAL EXAM:        /76   Pulse 95   Temp 98.5 °F (36.9 °C) (Oral)   Resp 16   Ht 5' 2\" (1.575 m)   Wt 160 lb 15 oz (73 kg)   SpO2 95%   BMI 29.44 kg/m²    Temp (24hrs), Av.8 °F (37.1 °C), Min:98.5 °F (36.9 °C), Max:99.1 °F (37.3 °C)      General appearance ill-appearing. Intubated.   Mental status -intubated and sedated  Eyes - pupils equal and reactive, extraocular eye movements intact   Ears - bilateral TM's and external ear canals normal   Mouth -orally intubated  Neck - supple, no significant adenopathy   Lymphatics - no palpable lymphadenopathy, no hepatosplenomegaly   Chest - clear to auscultation, no wheezes, rales or rhonchi, symmetric air entry   Heart - normal rate, regular rhythm, normal S1, S2, no murmurs  Abdomen - soft, nontender, nondistended, no masses or organomegaly   Neurological -intubated and sedated  Musculoskeletal - no joint tenderness, deformity or swelling   Extremities - peripheral pulses normal, no pedal edema, no clubbing or cyanosis   Skin - normal coloration and turgor, no rashes, no suspicious skin lesions noted ,      DATA:      Labs:     Results for orders placed or performed during the hospital encounter of 06/18/21   COVID-19, Rapid    Specimen: Nasopharyngeal Swab   Result Value Ref Range    Specimen Description . NASOPHARYNGEAL SWAB     SARS-CoV-2, Rapid Not Detected Not Detected   Culture, Blood 1    Specimen: Blood   Result Value Ref Range    Specimen Description . BLOOD     Special Requests R HAND 20ML     Culture NO GROWTH 2 DAYS    Culture, Blood 1    Specimen: Blood   Result Value Ref Range    Specimen Description . BLOOD     Special Requests L HAND 20ML     Culture NO GROWTH 2 DAYS    CBC Auto Differential   Result Value Ref Range    WBC 15.6 (H) 3.5 - 11.3 k/uL    RBC 4.13 3.95 - 5.11 m/uL    Hemoglobin 8.9 (L) 11.9 - 15.1 g/dL    Hematocrit 34.3 (L) 36.3 - 47.1 %    MCV 83.1 82.6 - 102.9 fL    MCH 21.5 (L) 25.2 - 33.5 pg    MCHC 25.9 (L) 28.4 - 34.8 g/dL    RDW 28.4 (H) 11.8 - 14.4 %    Platelets See Reflexed IPF Result 138 - 453 k/uL    MPV NOT REPORTED 8.1 - 13.5 fL    NRBC Automated 0.0 0.0 per 100 WBC    Differential Type NOT REPORTED     WBC Morphology NOT REPORTED     RBC Morphology NOT REPORTED     Platelet Estimate NOT REPORTED     Immature Granulocytes 0 0 %    Seg Neutrophils 55 36 - 66 %    Lymphocytes 27 24 - 44 %    Monocytes 11 (H) 1 - 7 %    Eosinophils % 6 (H) 1 - 4 %    Basophils 1 0 - 2 %    nRBC 1 (H) 0 per 100 WBC    Absolute Immature Granulocyte 0.00 0.00 - 0.30 k/uL    Segs Absolute 8.57 (H) 1.8 - 7.7 k/uL    Absolute Lymph # 4.21 1.0 - 4.8 k/uL    Absolute Mono # 1.72 (H) 0.1 - 0.8 k/uL    Absolute Eos # 0.94 (H) 0.0 - 0.4 k/uL    Basophils Absolute 0.16 0.0 - 0.2 k/uL    Morphology ANISOCYTOSIS PRESENT     Morphology HYPOCHROMIA PRESENT     Morphology 1+ POLYCHROMASIA     Morphology GIANT PLATELETS PRESENT    Comprehensive Metabolic Panel w/ Reflex to MG   Result Value Ref Range    Glucose 161 (H) 70 - 99 mg/dL    BUN 8 6 - 20 mg/dL    CREATININE 0.43 (L) 0.50 - 0.90 mg/dL    Bun/Cre Ratio NOT REPORTED 9 - 20    Calcium 8.7 8.6 - 10.4 mg/dL    Sodium 140 135 - 144 mmol/L    Potassium 3.9 3.7 - 5.3 mmol/L    Chloride 103 98 - 107 mmol/L    CO2 18 (L) 20 - 31 mmol/L    Anion Gap 19 (H) 9 - 17 mmol/L    Alkaline Phosphatase 64 35 - 104 U/L    ALT 5 5 - 33 U/L    AST 12 <32 U/L    Total Bilirubin <0.10 (L) 0.3 - 1.2 mg/dL    Total Protein 6.7 6.4 - 8.3 g/dL    Albumin 4.0 3.5 - 5.2 g/dL    Albumin/Globulin Ratio 1.5 1.0 - 2.5    GFR Non-African American >60 >60 mL/min    GFR African American >60 >60 mL/min    GFR Comment          GFR Staging NOT REPORTED    APTT   Result Value Ref Range    PTT 21.0 20.5 - 30.5 sec   Protime-INR   Result Value Ref Range    Protime 10.1 9.1 - 12.3 sec    INR 0.9    Troponin   Result Value Ref Range    Troponin, High Sensitivity 7 0 - 14 ng/L    Troponin T NOT REPORTED <0.03 ng/mL    Troponin Interp NOT REPORTED    Urinalysis Reflex to Culture    Specimen: Urine, clean catch   Result Value Ref Range    Color, UA YELLOW YELLOW    Turbidity UA CLEAR CLEAR    Glucose, Ur NEGATIVE NEGATIVE    Bilirubin Urine NEGATIVE NEGATIVE    Ketones, Urine TRACE (A) NEGATIVE    Specific Gravity, UA 1.035 (H) 1.005 - 1.030    Urine Hgb NEGATIVE NEGATIVE    pH, UA 5.0 5.0 - 8.0    Protein, UA TRACE (A) NEGATIVE    Urobilinogen, Urine Normal Normal    Nitrite, Urine NEGATIVE NEGATIVE    Leukocyte Esterase, Urine NEGATIVE NEGATIVE    Urinalysis Comments NOT REPORTED    Lactic Acid, Plasma   Result Value Ref Range    Lactic Acid NOT REPORTED mmol/L    Lactic Acid, Whole Blood 12.1 (H) 0.7 - 2.1 mmol/L   ELECTROLYTES PLUS   Result Value Ref Range    POC Sodium 143 138 - 146 mmol/L    POC Potassium 3.6 3.5 - 4.5 mmol/L    POC Chloride 108 (H) 98 - 107 mmol/L    POC TCO2 18 (L) 22 - 30 mmol/L    Anion Gap 18 (H) 7 - 16 mmol/L   Hemoglobin and hematocrit, blood   Result Value Ref Range    POC Hemoglobin 12.1 12.0 - 16.0 g/dL    POC Hematocrit 36 36 - 46 %   CALCIUM, IONIC (POC)   Result Value Ref Range    POC Ionized Calcium 1.14 (L) 1.15 - 1.33 mmol/L   Levetiracetam Level   Result Value Ref Range    Levetiracetam Lvl 23 ug/mL   Immature Platelet Fraction   Result Value Ref Range    Platelet, Immature Fraction 2.3 1.1 - 10.3 %    Platelet, Fluorescence 1,382 (HH) 138 - 453 k/uL   Troponin   Result Value Ref Range    Troponin, High Sensitivity 156 (HH) 0 - 14 ng/L    Troponin T NOT REPORTED <0.03 ng/mL    Troponin Interp NOT REPORTED    Microscopic Urinalysis   Result Value Ref Range    -          WBC, UA None 0 - 5 /HPF    RBC, UA 0 TO 2 0 - 4 /HPF    Casts UA NOT REPORTED 0 - 8 /LPF    Crystals, UA NOT REPORTED None /HPF    Epithelial Cells UA 0 TO 2 0 - 5 /HPF    Renal Epithelial, UA NOT REPORTED 0 /HPF    Bacteria, UA NOT REPORTED None    Mucus, UA NOT REPORTED None    Trichomonas, UA NOT REPORTED None    Amorphous, UA NOT REPORTED None    Other Observations UA NOT REPORTED NOT REQ.     Yeast, UA NOT REPORTED None   Procalcitonin   Result Value Ref Range    Procalcitonin 0.05 <0.09 ng/mL   Lactic Acid, Plasma   Result Value Ref Range    Lactic Acid NOT REPORTED mmol/L    Lactic Acid, Whole Blood 2.3 (H) 0.7 - 2.1 mmol/L   Lactic Acid, Plasma   Result Value Ref Range    Lactic Acid NOT REPORTED mmol/L    Lactic Acid, Whole Blood 1.4 0.7 - 2.1 mmol/L   Troponin   Result Value Ref Range    Troponin, High Sensitivity 166 (HH) 0 - 14 ng/L    Troponin T NOT REPORTED <0.03 ng/mL    Troponin Interp NOT REPORTED    Troponin   Result Value Ref Range    Troponin, High Sensitivity 142 (HH) 0 - 14 ng/L    Troponin T NOT REPORTED <0.03 ng/mL    Troponin Interp NOT REPORTED    Basic Metabolic Panel   Result Value Ref Range    Glucose 106 (H) 70 - 99 mg/dL    BUN 6 6 - 20 mg/dL    CREATININE 0.24 (L) 0.50 - 0.90 mg/dL    Bun/Cre Ratio NOT REPORTED 9 - 20    Calcium 8.0 (L) 8.6 - 10.4 mg/dL    Sodium 139 135 - 144 mmol/L    Potassium 3.4 (L) 3.7 - 5.3 mmol/L    Chloride 105 98 - 107 mmol/L    CO2 22 20 - 31 mmol/L    Anion Gap 12 9 - 17 mmol/L    GFR Non-African American >60 >60 mL/min    GFR African American >60 >60 mL/min    GFR Comment          GFR Staging NOT REPORTED    Troponin   Result Value Ref Range    Troponin, High Sensitivity 132 (HH) 0 - 14 ng/L    Troponin T NOT REPORTED <0.03 ng/mL    Troponin Interp NOT REPORTED    CALCIUM, IONIZED   Result Value Ref Range    Calcium, Ion 1.07 (L) 1.13 - 1.33 mmol/L   CBC auto differential   Result Value Ref Range    WBC 19.8 (H) 3.5 - 11.3 k/uL    RBC 3.56 (L) 3.95 - 5.11 m/uL    Hemoglobin 7.7 (L) 11.9 - 15.1 g/dL    Hematocrit 28.6 (L) 36.3 - 47.1 %    MCV 80.3 (L) 82.6 - 102.9 fL    MCH 21.6 (L) 25.2 - 33.5 pg    MCHC 26.9 (L) 28.4 - 34.8 g/dL    RDW 27.7 (H) 11.8 - 14.4 %    Platelets See Reflexed IPF Result 138 - 453 k/uL    MPV NOT REPORTED 8.1 - 13.5 fL    NRBC Automated 0.0 0.0 per 100 WBC    Differential Type NOT REPORTED     WBC Morphology NOT REPORTED     RBC Morphology NOT REPORTED     Platelet Estimate NOT REPORTED     Immature Granulocytes 0 0 %    Seg Neutrophils 82 (H) 36 - 66 %    Lymphocytes 9 (L) 24 - 44 %    Monocytes 9 (H) 1 - 7 %    Eosinophils % 0 (L) 1 - 4 %    Basophils 0 0 - 2 %    Absolute Immature Granulocyte 0.00 0.00 - 0.30 k/uL    Segs Absolute 16.24 (H) 1.8 - 7.7 k/uL    Absolute Lymph # 1.78 1.0 - 4.8 k/uL    Absolute Mono # 1.78 (H) 0.1 - 0.8 k/uL    Absolute Eos # 0.00 0.0 - 0.4 k/uL    Basophils Absolute 0.00 0.0 - 0.2 k/uL    Morphology ANISOCYTOSIS PRESENT     Morphology MICROCYTOSIS PRESENT     Morphology HYPOCHROMIA PRESENT     Morphology 1+ POLYCHROMASIA    Comprehensive Metabolic Panel   Result Value Ref Range    Glucose 102 (H) 70 - 99 mg/dL    BUN 4 (L) 6 - 20 mg/dL    CREATININE 0.23 (L) 0.50 - 0.90 mg/dL    Bun/Cre Ratio NOT REPORTED 9 - 20    Calcium 8.7 8.6 - 10.4 mg/dL    Sodium 137 135 - 144 mmol/L    Potassium 3.9 3.7 - 5.3 mmol/L    Chloride 102 98 - 107 mmol/L CO2 26 20 - 31 mmol/L    Anion Gap 9 9 - 17 mmol/L    Alkaline Phosphatase 55 35 - 104 U/L    ALT 5 5 - 33 U/L    AST 19 <32 U/L    Total Bilirubin 0.16 (L) 0.3 - 1.2 mg/dL    Total Protein 5.6 (L) 6.4 - 8.3 g/dL    Albumin 3.1 (L) 3.5 - 5.2 g/dL    Albumin/Globulin Ratio 1.2 1.0 - 2.5    GFR Non-African American >60 >60 mL/min    GFR African American >60 >60 mL/min    GFR Comment          GFR Staging NOT REPORTED    Lactic Acid, Plasma   Result Value Ref Range    Lactic Acid NOT REPORTED mmol/L    Lactic Acid, Whole Blood 1.0 0.7 - 2.1 mmol/L   Lactic Acid, Plasma   Result Value Ref Range    Lactic Acid NOT REPORTED mmol/L    Lactic Acid, Whole Blood 1.4 0.7 - 2.1 mmol/L   Magnesium   Result Value Ref Range    Magnesium 1.6 1.6 - 2.6 mg/dL   Phosphorus   Result Value Ref Range    Phosphorus 3.8 2.6 - 4.5 mg/dL   Troponin   Result Value Ref Range    Troponin, High Sensitivity 115 (HH) 0 - 14 ng/L    Troponin T NOT REPORTED <0.03 ng/mL    Troponin Interp NOT REPORTED    Lactic Acid, Plasma   Result Value Ref Range    Lactic Acid NOT REPORTED mmol/L    Lactic Acid, Whole Blood 1.1 0.7 - 2.1 mmol/L   Troponin   Result Value Ref Range    Troponin, High Sensitivity 111 (HH) 0 - 14 ng/L    Troponin T NOT REPORTED <0.03 ng/mL    Troponin Interp NOT REPORTED    Immature Platelet Fraction   Result Value Ref Range    Platelet, Immature Fraction 1.7 1.1 - 10.3 %    Platelet, Fluorescence 1,108 (HH) 138 - 453 k/uL   Lactic Acid, Plasma   Result Value Ref Range    Lactic Acid NOT REPORTED mmol/L    Lactic Acid, Whole Blood 1.0 0.7 - 2.1 mmol/L   Procalcitonin   Result Value Ref Range    Procalcitonin 0.08 <0.09 ng/mL   Path Review, Smear   Result Value Ref Range    Pathologist Review SEE REPORT    Reticulocytes   Result Value Ref Range    Retic % 0.3 (L) 0.5 - 1.9 %    Absolute Retic # 0.010 (L) 0.030 - 0.080 M/uL    Immature Retic Fract 7.600 2.7 - 18.3 %    Retic Hemoglobin 16.3 (L) 28.2 - 35.7 pg   Lactic Acid, Plasma Result Value Ref Range    Lactic Acid NOT REPORTED mmol/L    Lactic Acid, Whole Blood 0.9 0.7 - 2.1 mmol/L   Lactic Acid, Plasma   Result Value Ref Range    Lactic Acid NOT REPORTED mmol/L    Lactic Acid, Whole Blood 1.2 0.7 - 2.1 mmol/L   Ferritin   Result Value Ref Range    Ferritin 13 13 - 150 ug/L   CBC auto differential   Result Value Ref Range    WBC 14.5 (H) 3.5 - 11.3 k/uL    RBC 3.27 (L) 3.95 - 5.11 m/uL    Hemoglobin 6.8 (LL) 11.9 - 15.1 g/dL    Hematocrit 26.7 (L) 36.3 - 47.1 %    MCV 81.7 (L) 82.6 - 102.9 fL    MCH 20.8 (L) 25.2 - 33.5 pg    MCHC 25.5 (L) 28.4 - 34.8 g/dL    RDW 27.9 (H) 11.8 - 14.4 %    Platelets See Reflexed IPF Result 138 - 453 k/uL    MPV NOT REPORTED 8.1 - 13.5 fL    NRBC Automated 0.0 0.0 per 100 WBC    Differential Type NOT REPORTED     WBC Morphology NOT REPORTED     RBC Morphology NOT REPORTED     Platelet Estimate NOT REPORTED     Immature Granulocytes 0 0 %    Seg Neutrophils 85 (H) 36 - 66 %    Lymphocytes 4 (L) 24 - 44 %    Monocytes 10 (H) 1 - 7 %    Eosinophils % 0 (L) 1 - 4 %    Basophils 1 0 - 2 %    Absolute Immature Granulocyte 0.00 0.00 - 0.30 k/uL    Segs Absolute 12.32 (H) 1.8 - 7.7 k/uL    Absolute Lymph # 0.58 (L) 1.0 - 4.8 k/uL    Absolute Mono # 1.45 (H) 0.1 - 0.8 k/uL    Absolute Eos # 0.00 0.0 - 0.4 k/uL    Basophils Absolute 0.15 0.0 - 0.2 k/uL    Morphology MICROCYTOSIS PRESENT     Morphology ANISOCYTOSIS PRESENT     Morphology HYPOCHROMIA PRESENT     Morphology 1+ POLYCHROMASIA    Comprehensive Metabolic Panel   Result Value Ref Range    Glucose 82 70 - 99 mg/dL    BUN 3 (L) 6 - 20 mg/dL    CREATININE 0.23 (L) 0.50 - 0.90 mg/dL    Bun/Cre Ratio NOT REPORTED 9 - 20    Calcium 8.2 (L) 8.6 - 10.4 mg/dL    Sodium 139 135 - 144 mmol/L    Potassium 3.4 (L) 3.7 - 5.3 mmol/L    Chloride 105 98 - 107 mmol/L    CO2 26 20 - 31 mmol/L    Anion Gap 8 (L) 9 - 17 mmol/L    Alkaline Phosphatase 52 35 - 104 U/L    ALT 5 5 - 33 U/L    AST 14 <32 U/L    Total Bilirubin 0.21 THROMBOCYTOSIS  DIFFERENTIAL IS BROAD AND INCLUDES ACUTE BLOOD LOSS, IRON DEFICIENCY  ANEMIA, INFECTION AND OTHER INFLAMMATORY STATES, TRAUMA, MYELOID  NEOPLASM, OTHER MALIGNANCIES, ETC.    RESULTS-COMMENTS  PERIPHERAL BLOOD STUDY    CBC: Please see the electronic health record for CBC parameters  ([id number], 6/19/2021, 5:03). PLATELETS: Platelets  show normal morphology. LEUKOCYTES: White blood cells show normal morphology. There are no  blasts. ERYTHROCYTES: Red blood cells show microcytosis, hypochromia,  anisocytosis. Kasi Lovelace M.D. Source:  1: PERIPHERAL BLOOD FOR REVIEW BY PATHOLOGIST     URINALYSIS WITH MICROSCOPIC   Result Value Ref Range    Color, UA YELLOW YELLOW    Turbidity UA CLEAR CLEAR    Glucose, Ur NEGATIVE NEGATIVE    Bilirubin Urine NEGATIVE NEGATIVE    Ketones, Urine NEGATIVE NEGATIVE    Specific Gravity, UA 1.012 1.005 - 1.030    Urine Hgb NEGATIVE NEGATIVE    pH, UA 8.0 5.0 - 8.0    Protein, UA NEGATIVE NEGATIVE    Urobilinogen, Urine Normal Normal    Nitrite, Urine NEGATIVE NEGATIVE    Leukocyte Esterase, Urine NEGATIVE NEGATIVE    -          WBC, UA None 0 - 5 /HPF    RBC, UA 0 TO 2 0 - 4 /HPF    Casts UA NOT REPORTED 0 - 8 /LPF    Crystals, UA NOT REPORTED None /HPF    Epithelial Cells UA None 0 - 5 /HPF    Renal Epithelial, UA NOT REPORTED 0 /HPF    Bacteria, UA NOT REPORTED None    Mucus, UA NOT REPORTED None    Trichomonas, UA NOT REPORTED None    Amorphous, UA NOT REPORTED None    Other Observations UA NOT REPORTED NOT REQ.     Yeast, UA NOT REPORTED None   CBC auto differential   Result Value Ref Range    WBC 15.6 (H) 3.5 - 11.3 k/uL    RBC 3.60 (L) 3.95 - 5.11 m/uL    Hemoglobin 7.9 (L) 11.9 - 15.1 g/dL    Hematocrit 29.7 (L) 36.3 - 47.1 %    MCV 82.5 (L) 82.6 - 102.9 fL    MCH 21.9 (L) 25.2 - 33.5 pg    MCHC 26.6 (L) 28.4 - 34.8 g/dL    RDW 26.8 (H) 11.8 - 14.4 %    Platelets See Reflexed IPF Result 138 - 453 k/uL MPV NOT REPORTED 8.1 - 13.5 fL    NRBC Automated 0.0 0.0 per 100 WBC    Differential Type NOT REPORTED     WBC Morphology NOT REPORTED     RBC Morphology NOT REPORTED     Platelet Estimate NOT REPORTED     Immature Granulocytes 1 (H) 0 %    Seg Neutrophils 66 (H) 36 - 65 %    Lymphocytes 15 (L) 24 - 43 %    Monocytes 12 3 - 12 %    Eosinophils % 5 (H) 1 - 4 %    Basophils 1 0 - 2 %    Absolute Immature Granulocyte 0.16 0.00 - 0.30 k/uL    Segs Absolute 10.29 (H) 1.50 - 8.10 k/uL    Absolute Lymph # 2.34 1.10 - 3.70 k/uL    Absolute Mono # 1.87 (H) 0.10 - 1.20 k/uL    Absolute Eos # 0.78 (H) 0.00 - 0.44 k/uL    Basophils Absolute 0.16 0.00 - 0.20 k/uL    Morphology ANISOCYTOSIS PRESENT     Morphology MICROCYTOSIS PRESENT     Morphology HYPOCHROMIA PRESENT     Morphology 1+ POLYCHROMASIA    Comprehensive Metabolic Panel   Result Value Ref Range    Glucose 90 70 - 99 mg/dL    BUN 8 6 - 20 mg/dL    CREATININE 0.32 (L) 0.50 - 0.90 mg/dL    Bun/Cre Ratio NOT REPORTED 9 - 20    Calcium 8.5 (L) 8.6 - 10.4 mg/dL    Sodium 142 135 - 144 mmol/L    Potassium 4.0 3.7 - 5.3 mmol/L    Chloride 106 98 - 107 mmol/L    CO2 24 20 - 31 mmol/L    Anion Gap 12 9 - 17 mmol/L    Alkaline Phosphatase 51 35 - 104 U/L    ALT 5 5 - 33 U/L    AST 25 <32 U/L    Total Bilirubin <0.10 (L) 0.3 - 1.2 mg/dL    Total Protein 5.7 (L) 6.4 - 8.3 g/dL    Albumin 3.1 (L) 3.5 - 5.2 g/dL    Albumin/Globulin Ratio 1.2 1.0 - 2.5    GFR Non-African American >60 >60 mL/min    GFR African American >60 >60 mL/min    GFR Comment          GFR Staging NOT REPORTED    Magnesium   Result Value Ref Range    Magnesium 1.8 1.6 - 2.6 mg/dL   Phosphorus   Result Value Ref Range    Phosphorus 3.6 2.6 - 4.5 mg/dL   Immature Platelet Fraction   Result Value Ref Range    Platelet, Immature Fraction 1.2 1.1 - 10.3 %    Platelet, Fluorescence 1,225 () 138 - 453 k/uL   Magnesium   Result Value Ref Range    Magnesium 1.8 1.6 - 2.6 mg/dL   CBC   Result Value Ref Range    WBC 15.0 (H) 3.5 - 11.3 k/uL    RBC 3.77 (L) 3.95 - 5.11 m/uL    Hemoglobin 8.5 (L) 11.9 - 15.1 g/dL    Hematocrit 31.5 (L) 36.3 - 47.1 %    MCV 83.6 82.6 - 102.9 fL    MCH 22.5 (L) 25.2 - 33.5 pg    MCHC 27.0 (L) 28.4 - 34.8 g/dL    RDW 28.7 (H) 11.8 - 14.4 %    Platelets See Reflexed IPF Result 138 - 453 k/uL    MPV NOT REPORTED 8.1 - 13.5 fL    NRBC Automated 0.0 0.0 per 100 WBC   BASIC METABOLIC PANEL   Result Value Ref Range    Glucose 104 (H) 70 - 99 mg/dL    BUN 13 6 - 20 mg/dL    CREATININE 0.37 (L) 0.50 - 0.90 mg/dL    Bun/Cre Ratio NOT REPORTED 9 - 20    Calcium 8.3 (L) 8.6 - 10.4 mg/dL    Sodium 141 135 - 144 mmol/L    Potassium 4.1 3.7 - 5.3 mmol/L    Chloride 106 98 - 107 mmol/L    CO2 28 20 - 31 mmol/L    Anion Gap 7 (L) 9 - 17 mmol/L    GFR Non-African American >60 >60 mL/min    GFR African American >60 >60 mL/min    GFR Comment          GFR Staging NOT REPORTED    Immature Platelet Fraction   Result Value Ref Range    Platelet, Immature Fraction 1.2 1.1 - 10.3 %    Platelet, Fluorescence 1,172 (HH) 138 - 453 k/uL   Venous Blood Gas, POC   Result Value Ref Range    pH, Steffen 7.195 (LL) 7.320 - 7.430    pCO2, Steffen 42.9 41.0 - 51.0 mm Hg    pO2, Steffen 77.5 (H) 30 - 50 mm Hg    HCO3, Venous 16.6 (L) 22.0 - 29.0 mmol/L    Total CO2, Venous NOT REPORTED 23.0 - 30.0 mmol/L    Negative Base Excess, Steffen 11 (H) 0.0 - 2.0    Positive Base Excess, Steffen NOT REPORTED 0.0 - 3.0    O2 Sat, Steffen 92 (H) 60.0 - 85.0 %    O2 Device/Flow/% NOT REPORTED     Joe Test NOT REPORTED     Sample Site NOT REPORTED     Mode NOT REPORTED     FIO2 NOT REPORTED     Pt Temp NOT REPORTED     POC pH Temp NOT REPORTED     POC pCO2 Temp NOT REPORTED mm Hg    POC pO2 Temp NOT REPORTED mm Hg   Creatinine W/GFR Point of Care   Result Value Ref Range    POC Creatinine 0.58 0.51 - 1.19 mg/dL    GFR Comment >60 >60 mL/min    GFR Non-African American >60 >60 mL/min    GFR Comment         POCT urea (BUN)   Result Value Ref Range    POC BUN 8 8 - 26 mg/dL Lactic Acid, POC   Result Value Ref Range    POC Lactic Acid 9.98 (H) 0.56 - 1.39 mmol/L   POCT Glucose   Result Value Ref Range    POC Glucose 159 (H) 74 - 100 mg/dL   Arterial Blood Gas, POC   Result Value Ref Range    POC pH 7.319 (L) 7.350 - 7.450    POC pCO2 42.5 35.0 - 48.0 mm Hg    POC PO2 173.9 (H) 83.0 - 108.0 mm Hg    POC HCO3 21.8 21.0 - 28.0 mmol/L    TCO2 (calc), Art NOT REPORTED 22.0 - 29.0 mmol/L    Negative Base Excess, Art 4 (H) 0.0 - 2.0    Positive Base Excess, Art NOT REPORTED 0.0 - 3.0    POC O2 SAT 99 (H) 94.0 - 98.0 %    O2 Device/Flow/% Adult Ventilator     Joe Test POSITIVE     Sample Site Left Radial Artery     Mode PRVC     FIO2 50.0     Pt Temp NOT REPORTED     POC pH Temp NOT REPORTED     POC pCO2 Temp NOT REPORTED mm Hg    POC pO2 Temp NOT REPORTED mm Hg   Arterial Blood Gas, POC   Result Value Ref Range    POC pH 7.426 7.350 - 7.450    POC pCO2 42.5 35.0 - 48.0 mm Hg    POC PO2 98.7 83.0 - 108.0 mm Hg    POC HCO3 28.0 21.0 - 28.0 mmol/L    TCO2 (calc), Art NOT REPORTED 22.0 - 29.0 mmol/L    Negative Base Excess, Art NOT REPORTED 0.0 - 2.0    Positive Base Excess, Art 3 0.0 - 3.0    POC O2 SAT 98 94.0 - 98.0 %    O2 Device/Flow/% Adult Ventilator     Joe Test POSITIVE     Sample Site Left Radial Artery     Mode NOT REPORTED     FIO2 30.0     Pt Temp NOT REPORTED     POC pH Temp NOT REPORTED     POC pCO2 Temp NOT REPORTED mm Hg    POC pO2 Temp NOT REPORTED mm Hg   EKG 12 Lead   Result Value Ref Range    Ventricular Rate 129 BPM    Atrial Rate 129 BPM    P-R Interval 148 ms    QRS Duration 88 ms    Q-T Interval 316 ms    QTc Calculation (Bazett) 462 ms    P Axis 74 degrees    R Axis 39 degrees    T Axis 92 degrees   EKG 12 Lead   Result Value Ref Range    Ventricular Rate 98 BPM    Atrial Rate 98 BPM    P-R Interval 166 ms    QRS Duration 82 ms    Q-T Interval 362 ms    QTc Calculation (Bazett) 462 ms    P Axis 60 degrees    R Axis 26 degrees    T Axis 74 degrees   TYPE AND SCREEN Result Value Ref Range    Expiration Date 06/23/2021,2359     Arm Band Number [id number]     ABO/Rh A POSITIVE     Antibody Screen NOT TESTED     Antibody ID Anti-Kasia Present  PREVIOUSLY IDENTIFIED       AMBER IgG NEGATIVE     Unit Number [id number]     Product Code Leukocyte Reduced Red Cell     Unit Divison 00     Dispense Status TRANSFUSED     Transfusion Status OK TO TRANSFUSE     Crossmatch Result COMPATIBLE     Unit Number [id number]     Product Code Leukocyte Reduced Red Cell     Unit Divison 00     Dispense Status ALLOCATED     Transfusion Status OK TO TRANSFUSE     Crossmatch Result COMPATIBLE    BLOOD BANK SPECIMEN   Result Value Ref Range    Blood Bank Specimen NOT REPORTED          IMAGING DATA:    CT Head WO Contrast    Result Date: 6/18/2021  EXAMINATION: CT OF THE HEAD WITHOUT CONTRAST  6/18/2021 9:19 am TECHNIQUE: CT of the head was performed without the administration of intravenous contrast. Dose modulation, iterative reconstruction, and/or weight based adjustment of the mA/kV was utilized to reduce the radiation dose to as low as reasonably achievable. COMPARISON: 03/09/2021 HISTORY: ORDERING SYSTEM PROVIDED HISTORY: stroke TECHNOLOGIST PROVIDED HISTORY: stroke Decision Support Exception - unselect if not a suspected or confirmed emergency medical condition->Emergency Medical Condition (MA) FINDINGS: BRAIN/VENTRICLES: There is no acute infarct or acute intracranial hemorrhage present. There is no mass effect or midline shift present. There is no ventriculomegaly or abnormal extra-axial fluid collection present. ORBITS: Limited evaluation of the orbits is unremarkable. SINUSES: The paranasal sinuses and mastoid air cells are clear. SOFT TISSUES/SKULL:  No lytic or blastic osseous lesions are identified. No acute intracranial process identified. The findings were sent to the Radiology Results [id number] at 9:41 am on 6/18/2021to be communicated to the Stroke Neurology service. XR CHEST PORTABLE    Result Date: 6/18/2021  EXAMINATION: ONE XRAY VIEW OF THE CHEST 6/18/2021 10:22 am COMPARISON: AP chest from 03/09/2021; CT chest from 03/09/2021 HISTORY: ORDERING SYSTEM PROVIDED HISTORY: post intubation TECHNOLOGIST PROVIDED HISTORY: post intubation History of ovarian cancer and osseous metastases with previous splenic embolization. FINDINGS: ETT tip position satisfactory approximately 4 cm above the alejandro. Enteric tube tip and side hole project below left hemidiaphragm. Right IJ port in unchanged position, with good tip position in the upper right atrium. Coils splenic artery LUQ. Enlarged but stable appearing cardiac silhouette. Mediastinal structures appropriate for slight rotation to the left. Low lung volumes; mildly increased interstitial markings with scattered parenchymal densities and unchanged increased hilar shadows, L>R. Minimal unchanged blunting left costophrenic angle. No new pulmonary or right pleural abnormality. Scattered bony sclerotic foci, compatible with known Mets. No destructive lesion or obvious interval change. Support tubes satisfactory. Similar enlarged susan (known adenopathy), scattered parenchymal densities (known pulmonary metastases) without significant interval change; probable smaller left pleural effusion. CTA HEAD NECK W CONTRAST    Result Date: 6/18/2021  EXAMINATION: CTA OF THE HEAD AND NECK WITH CONTRAST 6/18/2021 9:19 am: TECHNIQUE: CTA of the head and neck was performed with the administration of intravenous contrast. Multiplanar reformatted images are provided for review. MIP images are provided for review. Stenosis of the internal carotid arteries measured using NASCET criteria. Dose modulation, iterative reconstruction, and/or weight based adjustment of the mA/kV was utilized to reduce the radiation dose to as low as reasonably achievable.  COMPARISON: CT brain earlier same day HISTORY: ORDERING SYSTEM PROVIDED HISTORY: stroke TECHNOLOGIST PROVIDED HISTORY: stroke Decision Support Exception - unselect if not a suspected or confirmed emergency medical condition->Emergency Medical Condition (MA) FINDINGS: CTA NECK: AORTIC ARCH/ARCH VESSELS: No dissection or arterial injury. No significant stenosis of the brachiocephalic or subclavian arteries. CAROTID ARTERIES: No dissection, arterial injury, or hemodynamically significant stenosis by NASCET criteria. VERTEBRAL ARTERIES: No dissection, arterial injury, or significant stenosis. SOFT TISSUES: There are multiple foci of soft tissue gas within the lower left neck, likely related to prior central venous line attempt. A right chest Port-A-Cath is present. The lung apices are clear. There is no pathologically enlarged lymphadenopathy or soft tissue mass identified. BONES: No lytic or blastic osseous lesions are identified. There is a posterior disc osteophyte complex at C5-6 resulting in mild spinal canal stenosis and mild-to-moderate bilateral neural foraminal narrowing. 3D surface reformations were performed and concur with the above findings. CTA HEAD: ANTERIOR CIRCULATION: The intracranial segments of the internal carotid arteries are normal.  There is no significant stenosis or aneurysm identified. The anterior and middle cerebral arteries are normal in appearance. No significant stenosis or aneurysm is identified. POSTERIOR CIRCULATION: The distal vertebral arteries are normal in appearance. The basilar artery is normal.  No significant stenosis or aneurysm is identified. The posterior cerebral arteries are normal in appearance. OTHER: No dural venous sinus thrombosis on this non-dedicated study. BRAIN: There is no mass effect or midline shift. There is no vascular malformation identified. 3D surface reformations were performed and concur with the above findings. 1. No large vessel occlusion, significant stenosis or cerebral aneurysm identified.  2. No significant arterial thrombosis (DVT) of femoral vein of left lower extremity (HCC) [I82.512] 03/11/2021    History of ovarian cancer [Z85.43]     Status epilepticus ([facility] [address] [G40.901] 01/04/2021    PRES (posterior reversible encephalopathy syndrome) [I67.83]     Hypochromic anemia [D50.9]     Type 2 diabetes mellitus without complication, without long-term current use of insulin ([facility] [address] [E11.9]     Malignant neoplasm of ovary ([facility] [address] [C56.9] 08/17/2020     Altered mental status  Recurrent ovarian cancer  Thrombocytosis  Iron deficiency  Abdominal pain  Abdominal mass      RECOMMENDATIONS:  1. Clinically improving. No more seizure activities. 2. Continue current care per neurology  3. Hemoglobin improved after transfusion  4. Leukocytosis is reactive and so is thrombocytosis  5.  okay to discharge to SNF. Will resume chemotherapy as an outpatient                                This note is created with the assistance of a speech recognition program.  While intending to generate a document that actually reflects the content of the visit, the document can still have some errors including those of syntax and sound a like substitutions which may escape proof reading. It such instances, actual meaning can be extrapolated by contextual diversion. This note is created with the assistance of a speech recognition program.  While intending to generate a document that actually reflects the content of the visit, the document can still have some errors including those of syntax and sound a like substitutions which may escape proof reading. It such instances, actual meaning can be extrapolated by contextual diversion.

## 2021-06-23 NOTE — PROGRESS NOTES
Today's Date: 6/22/2021  Patient Name: Jeronimo Mckeon  Date of admission: 6/18/2021  9:17 AM  Patient's age: 60 y.o., 1965  Admission Dx: Status epilepticus ([facility] [address] [G40.901]    Reason for Consult: management recommendations  Requesting Physician: Summer Schwartz MD    CHIEF COMPLAINT:      SUBJECTIVE:  . The patient was seen and examined. She is awake and conscious and oriented. Continues to feel dizzy. Treatment being adjusted by neurology team.  No recent seizure activities. No nausea or vomiting. No headaches. BRIEF CASE HISTORY:    The patient is a 60 y.o.  female who is admitted to the hospital for evaluation of unresponsiveness and concern regarding seizures. Patient admitted to the hospital due to concerns regarding seizures. Patient was found unresponsive by her son. Patient does have history of seizures has been on Keppra. Patient was also recently admitted to the neurology service for crest syndrome. Patient was noted to have a episode of seizure in route with ems. Patient also takes Eliquis for history of blood clots. In the ER patient was noted to have 2 more episodes of seizures. Patient MRI brain as well as CTA were unremarkable in regards to any acute event patient is currently being worked up by neurology for status epilepticus    Patient is known to our service. She has history of recurrent ovarian cancer. Original diagnosis was in 2005. Patient has been treated with Doxil Avastin in the past.  More recently she is being treated with gemcitabine patient last chemotherapy treatment was on 6/4/2021. Patient received cycle 5-day 8 of Gemzar. Lab work-up shows platelet count of 1.3  WBC count is 15 hemoglobin is 8.9. Recent iron studies show ferritin of 9. Patient is intubated at the time of evaluation and undergoing EEG.     Past Medical History:   has a past medical history of Anemia, Bleeding, Cervical cancer ([facility] [address] Depression, Diabetes mellitus ([facility] [address] GERD (gastroesophageal reflux disease), Hx of blood clots, Hypertension, Metastatic cancer ([facility] [address] Ovarian cancer ([facility] [address] Post chemo evaluation, and Splenic lesion. Past Surgical History:   has a past surgical history that includes Hysterectomy, total abdominal; Port Surgery; Tonsillectomy; IR PORT PLACEMENT > 5 YEARS (08/24/2020); Anus surgery; Abscess Drainage (2013); colectomy (03/2013); IR EMBOLIZATION HEMORRHAGE (10/05/2020); and Cardiac catheterization. Medications:    Prior to Admission medications    Medication Sig Start Date End Date Taking? Authorizing Provider   morphine (MS CONTIN) 15 MG extended release tablet Take 1 tablet by mouth 2 times daily for 30 days.  6/7/21 7/7/21  Teodora Sam MD   sertraline (ZOLOFT) 100 MG tablet Take 1 tablet by mouth daily 6/7/21 7/7/21  Teodora Sam MD   metoprolol succinate (TOPROL XL) 25 MG extended release tablet Take 1 tablet by mouth daily 6/7/21   Teodora Sam MD   ELIQUIS 5 MG TABS tablet Take 1 tablet by mouth daily 5/26/21   Historical Provider, MD   pantoprazole (PROTONIX) 40 MG tablet Take 1 tablet by mouth 2 times daily 5/10/21   Teodora Sam MD   levETIRAcetam (KEPPRA) 750 MG tablet Take 2 tablets by mouth 2 times daily 5/3/21   Teodora Sam MD   ferrous sulfate (FE TABS 325) 325 (65 Fe) MG EC tablet Take 1 tablet by mouth daily (with breakfast) 3/12/21   ESTRELLITA Horton NP   metFORMIN (GLUCOPHAGE-XR) 500 MG extended release tablet Take 2 tablets by mouth twice daily 2/26/21   Teodora Sam MD   aspirin 81 MG chewable tablet Take 81 mg by mouth nightly Pt not taking    Historical Provider, MD     Current Facility-Administered Medications   Medication Dose Route Frequency Provider Last Rate Last Admin    famotidine (PEPCID) tablet 20 mg  20 mg Oral BID Mauricio Childers MD        amoxicillin-clavulanate (AUGMENTIN) 875-125 MG per tablet 1 tablet  1 tablet Oral 2 times per day Bull Taylor MD   1 tablet at 06/22/21 3459    ferrous sulfate (FE TABS 325) EC tablet 325 mg  325 mg Oral Daily with breakfast Gerardo Altman MD   325 mg at 06/22/21 0906    meclizine (ANTIVERT) tablet 12.5 mg  12.5 mg Oral TID PRN Gerardo Altman MD   12.5 mg at 06/22/21 0906    0.9 % sodium chloride infusion   Intravenous PRN Zane Lewis MD        sertraline (ZOLOFT) tablet 100 mg  100 mg Oral Daily Debi Mohan, DO   100 mg at 06/22/21 0906    morphine (MS CONTIN) extended release tablet 15 mg  15 mg Oral 2 times per day Debi Mohan DO   15 mg at 06/22/21 0910    levETIRAcetam (KEPPRA) tablet 1,000 mg  1,000 mg Oral BID Debi Mohan, DO   1,000 mg at 06/22/21 0906    apixaban (ELIQUIS) tablet 5 mg  5 mg Oral Daily Debi Mohan DO   5 mg at 06/22/21 0906    oxyCODONE-acetaminophen (PERCOCET) 5-325 MG per tablet 1 tablet  1 tablet Oral Q4H PRN Debi Mohan, DO   1 tablet at 06/22/21 1726    sodium chloride flush 0.9 % injection 5-40 mL  5-40 mL Intravenous 2 times per day Debi Mohan, DO   10 mL at 06/22/21 0910    sodium chloride flush 0.9 % injection 5-40 mL  5-40 mL Intravenous PRN Debi Mohan, DO        0.9 % sodium chloride infusion  25 mL Intravenous PRN Debi Mohan, DO        ondansetron (ZOFRAN-ODT) disintegrating tablet 4 mg  4 mg Oral Q8H PRN Debi Mohan, DO        Or    ondansetron (ZOFRAN) injection 4 mg  4 mg Intravenous Q6H PRN Debi Mohan, DO   4 mg at 06/18/21 1414    polyethylene glycol (GLYCOLAX) packet 17 g  17 g Oral Daily PRN Debi Mohan, DO        acetaminophen (TYLENOL) tablet 650 mg  650 mg Oral Q6H PRN Debi Mohan, DO   650 mg at 06/20/21 1937    Or    acetaminophen (TYLENOL) suppository 650 mg  650 mg Rectal Q6H PRN Debi Mohan DO        fentaNYL (SUBLIMAZE) injection 100 mcg  100 mcg Intravenous Q1H PRN Briseyda Schuler MD   100 mcg at 06/19/21 0551    aspirin chewable tablet 324 mg  324 mg Oral Once Debi Mohan DO Allergies:  Ceftriaxone    Social History:   reports that she has been smoking cigarettes. She has been smoking about 1.00 pack per day. She uses smokeless tobacco. She reports previous alcohol use. She reports that she does not use drugs. Family History: family history includes Alcohol Abuse in her mother; Cirrhosis in her mother. REVIEW OF SYSTEMS:      Unable to obtain due to altered mental status  PHYSICAL EXAM:        /61   Pulse 90   Temp 99.1 °F (37.3 °C) (Oral)   Resp 21   Ht 5' 2\" (1.575 m)   Wt 160 lb (72.6 kg)   SpO2 97%   BMI 29.26 kg/m²    Temp (24hrs), Av.4 °F (36.9 °C), Min:97.9 °F (36.6 °C), Max:99.1 °F (37.3 °C)      General appearance ill-appearing. Intubated. Mental status -intubated and sedated  Eyes - pupils equal and reactive, extraocular eye movements intact   Ears - bilateral TM's and external ear canals normal   Mouth -orally intubated  Neck - supple, no significant adenopathy   Lymphatics - no palpable lymphadenopathy, no hepatosplenomegaly   Chest - clear to auscultation, no wheezes, rales or rhonchi, symmetric air entry   Heart - normal rate, regular rhythm, normal S1, S2, no murmurs  Abdomen - soft, nontender, nondistended, no masses or organomegaly   Neurological -intubated and sedated  Musculoskeletal - no joint tenderness, deformity or swelling   Extremities - peripheral pulses normal, no pedal edema, no clubbing or cyanosis   Skin - normal coloration and turgor, no rashes, no suspicious skin lesions noted ,      DATA:      Labs:     Results for orders placed or performed during the hospital encounter of 21   COVID-19, Rapid    Specimen: Nasopharyngeal Swab   Result Value Ref Range    Specimen Description . NASOPHARYNGEAL SWAB     SARS-CoV-2, Rapid Not Detected Not Detected   Culture, Blood 1    Specimen: Blood   Result Value Ref Range    Specimen Description . BLOOD     Special Requests R HAND 20ML     Culture NO GROWTH 1 DAY    Culture, Blood 1 Specimen: Blood   Result Value Ref Range    Specimen Description . BLOOD     Special Requests L HAND 20ML     Culture NO GROWTH 1 DAY    CBC Auto Differential   Result Value Ref Range    WBC 15.6 (H) 3.5 - 11.3 k/uL    RBC 4.13 3.95 - 5.11 m/uL    Hemoglobin 8.9 (L) 11.9 - 15.1 g/dL    Hematocrit 34.3 (L) 36.3 - 47.1 %    MCV 83.1 82.6 - 102.9 fL    MCH 21.5 (L) 25.2 - 33.5 pg    MCHC 25.9 (L) 28.4 - 34.8 g/dL    RDW 28.4 (H) 11.8 - 14.4 %    Platelets See Reflexed IPF Result 138 - 453 k/uL    MPV NOT REPORTED 8.1 - 13.5 fL    NRBC Automated 0.0 0.0 per 100 WBC    Differential Type NOT REPORTED     WBC Morphology NOT REPORTED     RBC Morphology NOT REPORTED     Platelet Estimate NOT REPORTED     Immature Granulocytes 0 0 %    Seg Neutrophils 55 36 - 66 %    Lymphocytes 27 24 - 44 %    Monocytes 11 (H) 1 - 7 %    Eosinophils % 6 (H) 1 - 4 %    Basophils 1 0 - 2 %    nRBC 1 (H) 0 per 100 WBC    Absolute Immature Granulocyte 0.00 0.00 - 0.30 k/uL    Segs Absolute 8.57 (H) 1.8 - 7.7 k/uL    Absolute Lymph # 4.21 1.0 - 4.8 k/uL    Absolute Mono # 1.72 (H) 0.1 - 0.8 k/uL    Absolute Eos # 0.94 (H) 0.0 - 0.4 k/uL    Basophils Absolute 0.16 0.0 - 0.2 k/uL    Morphology ANISOCYTOSIS PRESENT     Morphology HYPOCHROMIA PRESENT     Morphology 1+ POLYCHROMASIA     Morphology GIANT PLATELETS PRESENT    Comprehensive Metabolic Panel w/ Reflex to MG   Result Value Ref Range    Glucose 161 (H) 70 - 99 mg/dL    BUN 8 6 - 20 mg/dL    CREATININE 0.43 (L) 0.50 - 0.90 mg/dL    Bun/Cre Ratio NOT REPORTED 9 - 20    Calcium 8.7 8.6 - 10.4 mg/dL    Sodium 140 135 - 144 mmol/L    Potassium 3.9 3.7 - 5.3 mmol/L    Chloride 103 98 - 107 mmol/L    CO2 18 (L) 20 - 31 mmol/L    Anion Gap 19 (H) 9 - 17 mmol/L    Alkaline Phosphatase 64 35 - 104 U/L    ALT 5 5 - 33 U/L    AST 12 <32 U/L    Total Bilirubin <0.10 (L) 0.3 - 1.2 mg/dL    Total Protein 6.7 6.4 - 8.3 g/dL    Albumin 4.0 3.5 - 5.2 g/dL    Albumin/Globulin Ratio 1.5 1.0 - 2.5    GFR Microscopic Urinalysis   Result Value Ref Range    -          WBC, UA None 0 - 5 /HPF    RBC, UA 0 TO 2 0 - 4 /HPF    Casts UA NOT REPORTED 0 - 8 /LPF    Crystals, UA NOT REPORTED None /HPF    Epithelial Cells UA 0 TO 2 0 - 5 /HPF    Renal Epithelial, UA NOT REPORTED 0 /HPF    Bacteria, UA NOT REPORTED None    Mucus, UA NOT REPORTED None    Trichomonas, UA NOT REPORTED None    Amorphous, UA NOT REPORTED None    Other Observations UA NOT REPORTED NOT REQ.     Yeast, UA NOT REPORTED None   Procalcitonin   Result Value Ref Range    Procalcitonin 0.05 <0.09 ng/mL   Lactic Acid, Plasma   Result Value Ref Range    Lactic Acid NOT REPORTED mmol/L    Lactic Acid, Whole Blood 2.3 (H) 0.7 - 2.1 mmol/L   Lactic Acid, Plasma   Result Value Ref Range    Lactic Acid NOT REPORTED mmol/L    Lactic Acid, Whole Blood 1.4 0.7 - 2.1 mmol/L   Troponin   Result Value Ref Range    Troponin, High Sensitivity 166 (HH) 0 - 14 ng/L    Troponin T NOT REPORTED <0.03 ng/mL    Troponin Interp NOT REPORTED    Troponin   Result Value Ref Range    Troponin, High Sensitivity 142 (HH) 0 - 14 ng/L    Troponin T NOT REPORTED <0.03 ng/mL    Troponin Interp NOT REPORTED    Basic Metabolic Panel   Result Value Ref Range    Glucose 106 (H) 70 - 99 mg/dL    BUN 6 6 - 20 mg/dL    CREATININE 0.24 (L) 0.50 - 0.90 mg/dL    Bun/Cre Ratio NOT REPORTED 9 - 20    Calcium 8.0 (L) 8.6 - 10.4 mg/dL    Sodium 139 135 - 144 mmol/L    Potassium 3.4 (L) 3.7 - 5.3 mmol/L    Chloride 105 98 - 107 mmol/L    CO2 22 20 - 31 mmol/L    Anion Gap 12 9 - 17 mmol/L    GFR Non-African American >60 >60 mL/min    GFR African American >60 >60 mL/min    GFR Comment          GFR Staging NOT REPORTED    Troponin   Result Value Ref Range    Troponin, High Sensitivity 132 (HH) 0 - 14 ng/L    Troponin T NOT REPORTED <0.03 ng/mL    Troponin Interp NOT REPORTED    CALCIUM, IONIZED   Result Value Ref Range    Calcium, Ion 1.07 (L) 1.13 - 1.33 mmol/L   CBC auto differential   Result Value Ref Range    WBC 19.8 (H) 3.5 - 11.3 k/uL    RBC 3.56 (L) 3.95 - 5.11 m/uL    Hemoglobin 7.7 (L) 11.9 - 15.1 g/dL    Hematocrit 28.6 (L) 36.3 - 47.1 %    MCV 80.3 (L) 82.6 - 102.9 fL    MCH 21.6 (L) 25.2 - 33.5 pg    MCHC 26.9 (L) 28.4 - 34.8 g/dL    RDW 27.7 (H) 11.8 - 14.4 %    Platelets See Reflexed IPF Result 138 - 453 k/uL    MPV NOT REPORTED 8.1 - 13.5 fL    NRBC Automated 0.0 0.0 per 100 WBC    Differential Type NOT REPORTED     WBC Morphology NOT REPORTED     RBC Morphology NOT REPORTED     Platelet Estimate NOT REPORTED     Immature Granulocytes 0 0 %    Seg Neutrophils 82 (H) 36 - 66 %    Lymphocytes 9 (L) 24 - 44 %    Monocytes 9 (H) 1 - 7 %    Eosinophils % 0 (L) 1 - 4 %    Basophils 0 0 - 2 %    Absolute Immature Granulocyte 0.00 0.00 - 0.30 k/uL    Segs Absolute 16.24 (H) 1.8 - 7.7 k/uL    Absolute Lymph # 1.78 1.0 - 4.8 k/uL    Absolute Mono # 1.78 (H) 0.1 - 0.8 k/uL    Absolute Eos # 0.00 0.0 - 0.4 k/uL    Basophils Absolute 0.00 0.0 - 0.2 k/uL    Morphology ANISOCYTOSIS PRESENT     Morphology MICROCYTOSIS PRESENT     Morphology HYPOCHROMIA PRESENT     Morphology 1+ POLYCHROMASIA    Comprehensive Metabolic Panel   Result Value Ref Range    Glucose 102 (H) 70 - 99 mg/dL    BUN 4 (L) 6 - 20 mg/dL    CREATININE 0.23 (L) 0.50 - 0.90 mg/dL    Bun/Cre Ratio NOT REPORTED 9 - 20    Calcium 8.7 8.6 - 10.4 mg/dL    Sodium 137 135 - 144 mmol/L    Potassium 3.9 3.7 - 5.3 mmol/L    Chloride 102 98 - 107 mmol/L    CO2 26 20 - 31 mmol/L    Anion Gap 9 9 - 17 mmol/L    Alkaline Phosphatase 55 35 - 104 U/L    ALT 5 5 - 33 U/L    AST 19 <32 U/L    Total Bilirubin 0.16 (L) 0.3 - 1.2 mg/dL    Total Protein 5.6 (L) 6.4 - 8.3 g/dL    Albumin 3.1 (L) 3.5 - 5.2 g/dL    Albumin/Globulin Ratio 1.2 1.0 - 2.5    GFR Non-African American >60 >60 mL/min    GFR African American >60 >60 mL/min    GFR Comment          GFR Staging NOT REPORTED    Lactic Acid, Plasma   Result Value Ref Range    Lactic Acid NOT REPORTED mmol/L    Lactic Acid, Whole Blood 1.0 0.7 - 2.1 mmol/L   Lactic Acid, Plasma   Result Value Ref Range    Lactic Acid NOT REPORTED mmol/L    Lactic Acid, Whole Blood 1.4 0.7 - 2.1 mmol/L   Magnesium   Result Value Ref Range    Magnesium 1.6 1.6 - 2.6 mg/dL   Phosphorus   Result Value Ref Range    Phosphorus 3.8 2.6 - 4.5 mg/dL   Troponin   Result Value Ref Range    Troponin, High Sensitivity 115 (HH) 0 - 14 ng/L    Troponin T NOT REPORTED <0.03 ng/mL    Troponin Interp NOT REPORTED    Lactic Acid, Plasma   Result Value Ref Range    Lactic Acid NOT REPORTED mmol/L    Lactic Acid, Whole Blood 1.1 0.7 - 2.1 mmol/L   Troponin   Result Value Ref Range    Troponin, High Sensitivity 111 (HH) 0 - 14 ng/L    Troponin T NOT REPORTED <0.03 ng/mL    Troponin Interp NOT REPORTED    Immature Platelet Fraction   Result Value Ref Range    Platelet, Immature Fraction 1.7 1.1 - 10.3 %    Platelet, Fluorescence 1,108 (HH) 138 - 453 k/uL   Lactic Acid, Plasma   Result Value Ref Range    Lactic Acid NOT REPORTED mmol/L    Lactic Acid, Whole Blood 1.0 0.7 - 2.1 mmol/L   Procalcitonin   Result Value Ref Range    Procalcitonin 0.08 <0.09 ng/mL   Path Review, Smear   Result Value Ref Range    Pathologist Review SEE REPORT    Reticulocytes   Result Value Ref Range    Retic % 0.3 (L) 0.5 - 1.9 %    Absolute Retic # 0.010 (L) 0.030 - 0.080 M/uL    Immature Retic Fract 7.600 2.7 - 18.3 %    Retic Hemoglobin 16.3 (L) 28.2 - 35.7 pg   Lactic Acid, Plasma   Result Value Ref Range    Lactic Acid NOT REPORTED mmol/L    Lactic Acid, Whole Blood 0.9 0.7 - 2.1 mmol/L   Lactic Acid, Plasma   Result Value Ref Range    Lactic Acid NOT REPORTED mmol/L    Lactic Acid, Whole Blood 1.2 0.7 - 2.1 mmol/L   Ferritin   Result Value Ref Range    Ferritin 13 13 - 150 ug/L   CBC auto differential   Result Value Ref Range    WBC 14.5 (H) 3.5 - 11.3 k/uL    RBC 3.27 (L) 3.95 - 5.11 m/uL    Hemoglobin 6.8 (LL) 11.9 - 15.1 g/dL    Hematocrit 26.7 (L) 36.3 - 47.1 %    MCV 81.7 (L) 82.6 - 1.1 0.7 - 2.1 mmol/L   Immature Platelet Fraction   Result Value Ref Range    Platelet, Immature Fraction 1.4 1.1 - 10.3 %    Platelet, Fluorescence 1,109 (HH) 138 - 453 k/uL   HEMOGLOBIN AND HEMATOCRIT, BLOOD   Result Value Ref Range    Hemoglobin 6.9 (LL) 11.9 - 15.1 g/dL    Hematocrit 25.7 (L) 36.3 - 47.1 %   HEMOGLOBIN AND HEMATOCRIT, BLOOD   Result Value Ref Range    Hemoglobin 8.0 (L) 11.9 - 15.1 g/dL    Hematocrit 28.7 (L) 36.3 - 47.1 %   CBC auto differential   Result Value Ref Range    WBC 14.8 (H) 3.5 - 11.3 k/uL    RBC 3.61 (L) 3.95 - 5.11 m/uL    Hemoglobin 8.0 (L) 11.9 - 15.1 g/dL    Hematocrit 29.9 (L) 36.3 - 47.1 %    MCV 82.8 82.6 - 102.9 fL    MCH 22.2 (L) 25.2 - 33.5 pg    MCHC 26.8 (L) 28.4 - 34.8 g/dL    RDW 25.6 (H) 11.8 - 14.4 %    Platelets See Reflexed IPF Result 138 - 453 k/uL    MPV NOT REPORTED 8.1 - 13.5 fL    NRBC Automated 0.0 0.0 per 100 WBC    Differential Type NOT REPORTED     WBC Morphology NOT REPORTED     RBC Morphology NOT REPORTED     Platelet Estimate NOT REPORTED     Immature Granulocytes 1 (H) 0 %    Seg Neutrophils 68 (H) 36 - 65 %    Lymphocytes 11 (L) 24 - 43 %    Monocytes 13 (H) 3 - 12 %    Eosinophils % 6 (H) 1 - 4 %    Basophils 1 0 - 2 %    Absolute Immature Granulocyte 0.15 0.00 - 0.30 k/uL    Segs Absolute 10.06 (H) 1.50 - 8.10 k/uL    Absolute Lymph # 1.63 1.10 - 3.70 k/uL    Absolute Mono # 1.92 (H) 0.10 - 1.20 k/uL    Absolute Eos # 0.89 (H) 0.00 - 0.44 k/uL    Basophils Absolute 0.15 0.00 - 0.20 k/uL    Morphology ANISOCYTOSIS PRESENT     Morphology MICROCYTOSIS PRESENT     Morphology HYPOCHROMIA PRESENT     Morphology 1+ POLYCHROMASIA    Comprehensive Metabolic Panel   Result Value Ref Range    Glucose 84 70 - 99 mg/dL    BUN 4 (L) 6 - 20 mg/dL    CREATININE 0.26 (L) 0.50 - 0.90 mg/dL    Bun/Cre Ratio NOT REPORTED 9 - 20    Calcium 8.4 (L) 8.6 - 10.4 mg/dL    Sodium 140 135 - 144 mmol/L    Potassium 3.7 3.7 - 5.3 mmol/L    Chloride 106 98 - 107 mmol/L    CO2 27 20 - 31 mmol/L    Anion Gap 7 (L) 9 - 17 mmol/L    Alkaline Phosphatase 50 35 - 104 U/L    ALT <5 (L) 5 - 33 U/L    AST 10 <32 U/L    Total Bilirubin <0.10 (L) 0.3 - 1.2 mg/dL    Total Protein 5.6 (L) 6.4 - 8.3 g/dL    Albumin 3.0 (L) 3.5 - 5.2 g/dL    Albumin/Globulin Ratio 1.2 1.0 - 2.5    GFR Non-African American >60 >60 mL/min    GFR African American >60 >60 mL/min    GFR Comment          GFR Staging NOT REPORTED    Magnesium   Result Value Ref Range    Magnesium 2.2 1.6 - 2.6 mg/dL   Phosphorus   Result Value Ref Range    Phosphorus 2.7 2.6 - 4.5 mg/dL   Immature Platelet Fraction   Result Value Ref Range    Platelet, Immature Fraction 1.3 1.1 - 10.3 %    Platelet, Fluorescence 1,172 (HH) 138 - 453 k/uL   Surgical Pathology   Result Value Ref Range    Surgical Pathology Report       [id number]  [address], [address]. [place], [address]  [phone]  Fax: [phone]  SURGICAL PATHOLOGY CONSULTATION    Patient Name: María Crisostomo  MR#: [id number]  Specimen #[id number]    Procedures/Addenda  PERIPHERAL BLOOD REPORT     Date Ordered:     6/21/2021     Status:  Signed Out       Date Complete:     6/21/2021     By: Consuelo Dutta M.D. Date Reported:     6/21/2021       INTERPRETATION  PERIPHERAL BLOOD:  SEVERE MICROCYTIC ANEMIA WITH IRON STUDIES SUGGESTIVE OF IRON  DEFICIENCY ANEMIA. SEVERE THROMBOCYTOSIS. THROMBOCYTOSIS  DIFFERENTIAL IS BROAD AND INCLUDES ACUTE BLOOD LOSS, IRON DEFICIENCY  ANEMIA, INFECTION AND OTHER INFLAMMATORY STATES, TRAUMA, MYELOID  NEOPLASM, OTHER MALIGNANCIES, ETC.    RESULTS-COMMENTS  PERIPHERAL BLOOD STUDY    CBC: Please see the electronic health record for CBC parameters  ([id number], 6/19/2021, 5:03). PLATELETS: Platelets  show normal morphology. LEUKOCYTES: White blood cells show normal morphology. There are no  blasts.     ERYTHROCYTES: Red blood cells show k/uL    Absolute Mono # 1.87 (H) 0.10 - 1.20 k/uL    Absolute Eos # 0.78 (H) 0.00 - 0.44 k/uL    Basophils Absolute 0.16 0.00 - 0.20 k/uL    Morphology ANISOCYTOSIS PRESENT     Morphology MICROCYTOSIS PRESENT     Morphology HYPOCHROMIA PRESENT     Morphology 1+ POLYCHROMASIA    Comprehensive Metabolic Panel   Result Value Ref Range    Glucose 90 70 - 99 mg/dL    BUN 8 6 - 20 mg/dL    CREATININE 0.32 (L) 0.50 - 0.90 mg/dL    Bun/Cre Ratio NOT REPORTED 9 - 20    Calcium 8.5 (L) 8.6 - 10.4 mg/dL    Sodium 142 135 - 144 mmol/L    Potassium 4.0 3.7 - 5.3 mmol/L    Chloride 106 98 - 107 mmol/L    CO2 24 20 - 31 mmol/L    Anion Gap 12 9 - 17 mmol/L    Alkaline Phosphatase 51 35 - 104 U/L    ALT 5 5 - 33 U/L    AST 25 <32 U/L    Total Bilirubin <0.10 (L) 0.3 - 1.2 mg/dL    Total Protein 5.7 (L) 6.4 - 8.3 g/dL    Albumin 3.1 (L) 3.5 - 5.2 g/dL    Albumin/Globulin Ratio 1.2 1.0 - 2.5    GFR Non-African American >60 >60 mL/min    GFR African American >60 >60 mL/min    GFR Comment          GFR Staging NOT REPORTED    Magnesium   Result Value Ref Range    Magnesium 1.8 1.6 - 2.6 mg/dL   Phosphorus   Result Value Ref Range    Phosphorus 3.6 2.6 - 4.5 mg/dL   Immature Platelet Fraction   Result Value Ref Range    Platelet, Immature Fraction 1.2 1.1 - 10.3 %    Platelet, Fluorescence 1,225 (HH) 138 - 453 k/uL   Venous Blood Gas, POC   Result Value Ref Range    pH, Steffen 7.195 (LL) 7.320 - 7.430    pCO2, Tseffen 42.9 41.0 - 51.0 mm Hg    pO2, Steffen 77.5 (H) 30 - 50 mm Hg    HCO3, Venous 16.6 (L) 22.0 - 29.0 mmol/L    Total CO2, Venous NOT REPORTED 23.0 - 30.0 mmol/L    Negative Base Excess, Steffen 11 (H) 0.0 - 2.0    Positive Base Excess, Steffen NOT REPORTED 0.0 - 3.0    O2 Sat, Steffen 92 (H) 60.0 - 85.0 %    O2 Device/Flow/% NOT REPORTED     Joe Test NOT REPORTED     Sample Site NOT REPORTED     Mode NOT REPORTED     FIO2 NOT REPORTED     Pt Temp NOT REPORTED     POC pH Temp NOT REPORTED     POC pCO2 Temp NOT REPORTED mm Hg    POC pO2 Temp NOT degrees   EKG 12 Lead   Result Value Ref Range    Ventricular Rate 98 BPM    Atrial Rate 98 BPM    P-R Interval 166 ms    QRS Duration 82 ms    Q-T Interval 362 ms    QTc Calculation (Bazett) 462 ms    P Axis 60 degrees    R Axis 26 degrees    T Axis 74 degrees   TYPE AND SCREEN   Result Value Ref Range    Expiration Date 06/23/2021,2359     Arm Band Number [id number]     ABO/Rh A POSITIVE     Antibody Screen NOT TESTED     Antibody ID Anti-[place] Present  PREVIOUSLY IDENTIFIED       AMBER IgG NEGATIVE     Unit Number [id number]     Product Code Leukocyte Reduced Red Cell     Unit Divison 00     Dispense Status TRANSFUSED     Transfusion Status OK TO TRANSFUSE     Crossmatch Result COMPATIBLE     Unit Number [id number]     Product Code Leukocyte Reduced Red Cell     Unit Divison 00     Dispense Status ALLOCATED     Transfusion Status OK TO TRANSFUSE     Crossmatch Result COMPATIBLE    BLOOD BANK SPECIMEN   Result Value Ref Range    Blood Bank Specimen NOT REPORTED          IMAGING DATA:    CT Head WO Contrast    Result Date: 6/18/2021  EXAMINATION: CT OF THE HEAD WITHOUT CONTRAST  6/18/2021 9:19 am TECHNIQUE: CT of the head was performed without the administration of intravenous contrast. Dose modulation, iterative reconstruction, and/or weight based adjustment of the mA/kV was utilized to reduce the radiation dose to as low as reasonably achievable. COMPARISON: 03/09/2021 HISTORY: ORDERING SYSTEM PROVIDED HISTORY: stroke TECHNOLOGIST PROVIDED HISTORY: stroke Decision Support Exception - unselect if not a suspected or confirmed emergency medical condition->Emergency Medical Condition (MA) FINDINGS: BRAIN/VENTRICLES: There is no acute infarct or acute intracranial hemorrhage present. There is no mass effect or midline shift present. There is no ventriculomegaly or abnormal extra-axial fluid collection present. ORBITS: Limited evaluation of the orbits is unremarkable.  SINUSES: The paranasal sinuses and mastoid air cells are clear. SOFT TISSUES/SKULL:  No lytic or blastic osseous lesions are identified. No acute intracranial process identified. The findings were sent to the Radiology Results [id number] at 9:41 am on 6/18/2021to be communicated to the Stroke Neurology service. XR CHEST PORTABLE    Result Date: 6/18/2021  EXAMINATION: ONE XRAY VIEW OF THE CHEST 6/18/2021 10:22 am COMPARISON: AP chest from 03/09/2021; CT chest from 03/09/2021 HISTORY: ORDERING SYSTEM PROVIDED HISTORY: post intubation TECHNOLOGIST PROVIDED HISTORY: post intubation History of ovarian cancer and osseous metastases with previous splenic embolization. FINDINGS: ETT tip position satisfactory approximately 4 cm above the alejandro. Enteric tube tip and side hole project below left hemidiaphragm. Right IJ port in unchanged position, with good tip position in the upper right atrium. Coils splenic artery LUQ. Enlarged but stable appearing cardiac silhouette. Mediastinal structures appropriate for slight rotation to the left. Low lung volumes; mildly increased interstitial markings with scattered parenchymal densities and unchanged increased hilar shadows, L>R. Minimal unchanged blunting left costophrenic angle. No new pulmonary or right pleural abnormality. Scattered bony sclerotic foci, compatible with known Mets. No destructive lesion or obvious interval change. Support tubes satisfactory. Similar enlarged susan (known adenopathy), scattered parenchymal densities (known pulmonary metastases) without significant interval change; probable smaller left pleural effusion. CTA HEAD NECK W CONTRAST    Result Date: 6/18/2021  EXAMINATION: CTA OF THE HEAD AND NECK WITH CONTRAST 6/18/2021 9:19 am: TECHNIQUE: CTA of the head and neck was performed with the administration of intravenous contrast. Multiplanar reformatted images are provided for review. MIP images are provided for review.  Stenosis of the internal carotid arteries measured using NASCET criteria. Dose modulation, iterative reconstruction, and/or weight based adjustment of the mA/kV was utilized to reduce the radiation dose to as low as reasonably achievable. COMPARISON: CT brain earlier same day HISTORY: ORDERING SYSTEM PROVIDED HISTORY: stroke TECHNOLOGIST PROVIDED HISTORY: stroke Decision Support Exception - unselect if not a suspected or confirmed emergency medical condition->Emergency Medical Condition (MA) FINDINGS: CTA NECK: AORTIC ARCH/ARCH VESSELS: No dissection or arterial injury. No significant stenosis of the brachiocephalic or subclavian arteries. CAROTID ARTERIES: No dissection, arterial injury, or hemodynamically significant stenosis by NASCET criteria. VERTEBRAL ARTERIES: No dissection, arterial injury, or significant stenosis. SOFT TISSUES: There are multiple foci of soft tissue gas within the lower left neck, likely related to prior central venous line attempt. A right chest Port-A-Cath is present. The lung apices are clear. There is no pathologically enlarged lymphadenopathy or soft tissue mass identified. BONES: No lytic or blastic osseous lesions are identified. There is a posterior disc osteophyte complex at C5-6 resulting in mild spinal canal stenosis and mild-to-moderate bilateral neural foraminal narrowing. 3D surface reformations were performed and concur with the above findings. CTA HEAD: ANTERIOR CIRCULATION: The intracranial segments of the internal carotid arteries are normal.  There is no significant stenosis or aneurysm identified. The anterior and middle cerebral arteries are normal in appearance. No significant stenosis or aneurysm is identified. POSTERIOR CIRCULATION: The distal vertebral arteries are normal in appearance. The basilar artery is normal.  No significant stenosis or aneurysm is identified. The posterior cerebral arteries are normal in appearance. OTHER: No dural venous sinus thrombosis on this non-dedicated study.  BRAIN: There is no mass effect or midline shift. There is no vascular malformation identified. 3D surface reformations were performed and concur with the above findings. 1. No large vessel occlusion, significant stenosis or cerebral aneurysm identified. 2. No significant arterial stenosis identified within the neck. 3. Several foci of gas lucency within the lower left neck. Correlation with any history of attempted left central venous catheter placement is recommended. MRI LIMITED BRAIN    Result Date: 6/18/2021  EXAMINATION: MRI OF THE BRAIN WITHOUT CONTRAST  6/18/2021 11:03 am TECHNIQUE: Multiplanar multisequence MRI of the brain was performed without the administration of intravenous contrast. COMPARISON: October 23, 2020 HISTORY: ORDERING SYSTEM PROVIDED HISTORY: acute right sided weakness, seizures, prior hx of PRES TECHNOLOGIST PROVIDED HISTORY: acute right sided weakness, seizures, prior hx of PRES Reason for Exam: cva, rt sided weakness FINDINGS: INTRACRANIAL STRUCTURES/VENTRICLES: There is no restricted diffusion to suggest acute infarct. No mass effect or midline shift. No evidence of an acute intracranial hemorrhage. The ventricles and sulci are normal in size and configuration. Scattered white matter changes in the subcortical white matter periventricular white matter present. There is no evidence for blood products on gradient imaging. The sellar/suprasellar regions appear unremarkable. The normal signal voids within the major intracranial vessels appear maintained. ORBITS: The visualized portion of the orbits demonstrate no acute abnormality. SINUSES: The visualized paranasal sinuses and mastoid air cells are well aerated. BONES/SOFT TISSUES: The bone marrow signal intensity appears normal. The soft tissues demonstrate no acute abnormality. Mild chronic white matter changes mild volume loss.  No evidence for restricted diffusion to suggest acute infarct         IMPRESSION:   Primary Problem  Status epilepticus [facility])    Active Hospital Problems    Diagnosis Date Noted    History of deep venous thrombosis (DVT) of distal vein of left lower extremity: On Eliquis [Z86.718] 06/20/2021    Thrombocytosis (HCC) [D47.3]     Chronic deep vein thrombosis (DVT) of femoral vein of left lower extremity ([facility] [address] [I82.512] 03/11/2021    History of ovarian cancer [Z85.43]     Status epilepticus ([facility] [address] [G40.901] 01/04/2021    PRES (posterior reversible encephalopathy syndrome) [I67.83]     Hypochromic anemia [D50.9]     Type 2 diabetes mellitus without complication, without long-term current use of insulin ([facility] [address] [E11.9]     Malignant neoplasm of ovary ([facility] [address] [C56.9] 08/17/2020     Altered mental status  Recurrent ovarian cancer  Thrombocytosis  Iron deficiency  Abdominal pain  Abdominal mass      RECOMMENDATIONS:  1. Clinically improving. No more seizure activities. 2. Continue current care per neurology  3. Hemoglobin improved after transfusion  4. Leukocytosis is reactive and so is thrombocytosis  5. Hold chemotherapy until she recovers  6. We will do CT scan of the abdomen to evaluate the palpable abdominal mass. [address] [facility] [place] [facility]                            This note is created with the assistance of a speech recognition program.  While intending to generate a document that actually reflects the content of the visit, the document can still have some errors including those of syntax and sound a like substitutions which may escape proof reading. It such instances, actual meaning can be extrapolated by contextual diversion. This note is created with the assistance of a speech recognition program.  While intending to generate a document that actually reflects the content of the visit, the document can still have some errors including those of syntax and sound a like substitutions which may escape proof reading.   It such instances, actual meaning can be extrapolated by contextual diversion.

## 2021-06-23 NOTE — PROGRESS NOTES
Port de-accessed and re-heparinized by [employer] per patient request and also in preparation for discharge this evening or early tomorrow morning.       Electronically signed by Lilliana Rodriguez RN on 6/23/2021 at 5:18 PM

## 2021-06-24 VITALS
BODY MASS INDEX: 29.62 KG/M2 | HEIGHT: 62 IN | RESPIRATION RATE: 16 BRPM | HEART RATE: 73 BPM | TEMPERATURE: 98 F | OXYGEN SATURATION: 99 % | DIASTOLIC BLOOD PRESSURE: 54 MMHG | SYSTOLIC BLOOD PRESSURE: 120 MMHG | WEIGHT: 160.94 LBS

## 2021-06-24 DIAGNOSIS — M54.59 INTRACTABLE LOW BACK PAIN: ICD-10-CM

## 2021-06-24 DIAGNOSIS — C79.51 CANCER, METASTATIC TO BONE (HCC): ICD-10-CM

## 2021-06-24 LAB
ABO/RH: NORMAL
ANION GAP SERPL CALCULATED.3IONS-SCNC: 11 MMOL/L (ref 9–17)
ANTIBODY IDENTIFICATION: NORMAL
ANTIBODY SCREEN: NORMAL
ARM BAND NUMBER: NORMAL
BLD PROD TYP BPU: NORMAL
BLD PROD TYP BPU: NORMAL
BUN BLDV-MCNC: 9 MG/DL (ref 6–20)
BUN/CREAT BLD: ABNORMAL (ref 9–20)
CALCIUM SERPL-MCNC: 8.4 MG/DL (ref 8.6–10.4)
CHLORIDE BLD-SCNC: 104 MMOL/L (ref 98–107)
CO2: 23 MMOL/L (ref 20–31)
CREAT SERPL-MCNC: 0.29 MG/DL (ref 0.5–0.9)
CROSSMATCH RESULT: NORMAL
CROSSMATCH RESULT: NORMAL
DAT IGG: NEGATIVE
DISPENSE STATUS BLOOD BANK: NORMAL
DISPENSE STATUS BLOOD BANK: NORMAL
EXPIRATION DATE: NORMAL
GFR AFRICAN AMERICAN: >60 ML/MIN
GFR NON-AFRICAN AMERICAN: >60 ML/MIN
GFR SERPL CREATININE-BSD FRML MDRD: ABNORMAL ML/MIN/{1.73_M2}
GFR SERPL CREATININE-BSD FRML MDRD: ABNORMAL ML/MIN/{1.73_M2}
GLUCOSE BLD-MCNC: 109 MG/DL (ref 70–99)
MAGNESIUM: 1.9 MG/DL (ref 1.6–2.6)
POTASSIUM SERPL-SCNC: 4.3 MMOL/L (ref 3.7–5.3)
SODIUM BLD-SCNC: 138 MMOL/L (ref 135–144)
TRANSFUSION STATUS: NORMAL
TRANSFUSION STATUS: NORMAL
UNIT DIVISION: 0
UNIT DIVISION: 0
UNIT NUMBER: NORMAL
UNIT NUMBER: NORMAL

## 2021-06-24 PROCEDURE — 6370000000 HC RX 637 (ALT 250 FOR IP): Performed by: STUDENT IN AN ORGANIZED HEALTH CARE EDUCATION/TRAINING PROGRAM

## 2021-06-24 PROCEDURE — 6370000000 HC RX 637 (ALT 250 FOR IP): Performed by: INTERNAL MEDICINE

## 2021-06-24 PROCEDURE — 36415 COLL VENOUS BLD VENIPUNCTURE: CPT

## 2021-06-24 PROCEDURE — 80048 BASIC METABOLIC PNL TOTAL CA: CPT

## 2021-06-24 PROCEDURE — 99232 SBSQ HOSP IP/OBS MODERATE 35: CPT | Performed by: PSYCHIATRY & NEUROLOGY

## 2021-06-24 PROCEDURE — 83735 ASSAY OF MAGNESIUM: CPT

## 2021-06-24 PROCEDURE — 99232 SBSQ HOSP IP/OBS MODERATE 35: CPT | Performed by: INTERNAL MEDICINE

## 2021-06-24 PROCEDURE — 99239 HOSP IP/OBS DSCHRG MGMT >30: CPT | Performed by: INTERNAL MEDICINE

## 2021-06-24 RX ADMIN — SERTRALINE 100 MG: 50 TABLET, FILM COATED ORAL at 08:59

## 2021-06-24 RX ADMIN — APIXABAN 5 MG: 5 TABLET, FILM COATED ORAL at 08:59

## 2021-06-24 RX ADMIN — LEVETIRACETAM 1500 MG: 500 TABLET, FILM COATED ORAL at 08:59

## 2021-06-24 RX ADMIN — OXYCODONE HYDROCHLORIDE AND ACETAMINOPHEN 1 TABLET: 5; 325 TABLET ORAL at 09:29

## 2021-06-24 RX ADMIN — FERROUS SULFATE TAB EC 325 MG (65 MG FE EQUIVALENT) 325 MG: 325 (65 FE) TABLET DELAYED RESPONSE at 08:59

## 2021-06-24 RX ADMIN — LAMOTRIGINE 25 MG: 25 TABLET ORAL at 08:59

## 2021-06-24 RX ADMIN — MORPHINE SULFATE 15 MG: 15 TABLET, EXTENDED RELEASE ORAL at 09:15

## 2021-06-24 RX ADMIN — AMOXICILLIN AND CLAVULANATE POTASSIUM 1 TABLET: 875; 125 TABLET, FILM COATED ORAL at 08:59

## 2021-06-24 RX ADMIN — MECLIZINE HYDROCHLORIDE 12.5 MG: 12.5 TABLET, FILM COATED ORAL at 03:41

## 2021-06-24 RX ADMIN — OXYCODONE HYDROCHLORIDE AND ACETAMINOPHEN 1 TABLET: 5; 325 TABLET ORAL at 03:41

## 2021-06-24 RX ADMIN — FAMOTIDINE 20 MG: 20 TABLET, FILM COATED ORAL at 08:59

## 2021-06-24 RX ADMIN — MECLIZINE HYDROCHLORIDE 12.5 MG: 12.5 TABLET, FILM COATED ORAL at 12:19

## 2021-06-24 ASSESSMENT — PAIN DESCRIPTION - PROGRESSION
CLINICAL_PROGRESSION: GRADUALLY IMPROVING
CLINICAL_PROGRESSION: NOT CHANGED

## 2021-06-24 ASSESSMENT — PAIN DESCRIPTION - LOCATION
LOCATION: BACK
LOCATION: BACK

## 2021-06-24 ASSESSMENT — PAIN SCALES - GENERAL
PAINLEVEL_OUTOF10: 8
PAINLEVEL_OUTOF10: 4
PAINLEVEL_OUTOF10: 4
PAINLEVEL_OUTOF10: 8

## 2021-06-24 ASSESSMENT — PAIN DESCRIPTION - ONSET
ONSET: ON-GOING
ONSET: ON-GOING

## 2021-06-24 ASSESSMENT — PAIN DESCRIPTION - DESCRIPTORS
DESCRIPTORS: PATIENT UNABLE TO DESCRIBE
DESCRIPTORS: PATIENT UNABLE TO DESCRIBE

## 2021-06-24 ASSESSMENT — PAIN DESCRIPTION - PAIN TYPE
TYPE: CHRONIC PAIN
TYPE: CHRONIC PAIN

## 2021-06-24 ASSESSMENT — PAIN DESCRIPTION - FREQUENCY
FREQUENCY: CONTINUOUS
FREQUENCY: CONTINUOUS

## 2021-06-24 ASSESSMENT — PAIN DESCRIPTION - ORIENTATION
ORIENTATION: LOWER
ORIENTATION: LOWER

## 2021-06-24 NOTE — PROGRESS NOTES
Today's Date: 6/24/2021  Patient Name: Angelita Jackson  Date of admission: 6/18/2021  9:17 AM  Patient's age: 60 y.o., 1965  Admission Dx: Status epilepticus ([facility] [address] [G40.901]    Reason for Consult: management recommendations  Requesting Physician: No admitting provider for patient encounter. CHIEF COMPLAINT:      SUBJECTIVE:  . The patient was seen and examined. She is awake and conscious and oriented. Continues to feel dizzy but her symptoms are improving   No nausea or vomiting   BRIEF CASE HISTORY:    The patient is a 60 y.o.  female who is admitted to the hospital for evaluation of unresponsiveness and concern regarding seizures. Patient admitted to the hospital due to concerns regarding seizures. Patient was found unresponsive by her son. Patient does have history of seizures has been on Keppra. Patient was also recently admitted to the neurology service for crest syndrome. Patient was noted to have a episode of seizure in route with ems. Patient also takes Eliquis for history of blood clots. In the ER patient was noted to have 2 more episodes of seizures. Patient MRI brain as well as CTA were unremarkable in regards to any acute event patient is currently being worked up by neurology for status epilepticus    Patient is known to our service. She has history of recurrent ovarian cancer. Original diagnosis was in 2005. Patient has been treated with Doxil Avastin in the past.  More recently she is being treated with gemcitabine patient last chemotherapy treatment was on 6/4/2021. Patient received cycle 5-day 8 of Gemzar. Lab work-up shows platelet count of 1.3  WBC count is 15 hemoglobin is 8.9. Recent iron studies show ferritin of 9. Patient is intubated at the time of evaluation and undergoing EEG.     Past Medical History:   has a past medical history of Anemia, Bleeding, Cervical cancer ([address] Depression, Diabetes mellitus ([address] GERD (gastroesophageal reflux disease), Hx of blood clots, Hypertension, Metastatic cancer ([facility] [address] Ovarian cancer ([facility] [address] Post chemo evaluation, and Splenic lesion. Past Surgical History:   has a past surgical history that includes Hysterectomy, total abdominal; Port Surgery; Tonsillectomy; IR PORT PLACEMENT > 5 YEARS (08/24/2020); Anus surgery; Abscess Drainage (2013); colectomy (03/2013); IR EMBOLIZATION HEMORRHAGE (10/05/2020); and Cardiac catheterization. Medications:    Prior to Admission medications    Medication Sig Start Date End Date Taking? Authorizing Provider   oxyCODONE-acetaminophen (PERCOCET) 5-325 MG per tablet Take 1 tablet by mouth every 6 hours as needed for Pain (breakthrough pain) for up to 5 days. 6/23/21 6/28/21 Yes Cande Agustin MD   amoxicillin-clavulanate (AUGMENTIN) 875-125 MG per tablet Take 1 tablet by mouth every 12 hours for 4 days 6/23/21 6/27/21 Yes Cande Agustin MD   lamoTRIgine (LAMICTAL) 25 MG tablet Take 1 tablet by mouth daily 6/24/21  Yes Cande Agustin MD   meclizine (ANTIVERT) 12.5 MG tablet Take 1 tablet by mouth 3 times daily as needed for Dizziness 6/23/21 7/3/21 Yes Cande Agustin MD   ondansetron (ZOFRAN-ODT) 4 MG disintegrating tablet Take 1 tablet by mouth every 8 hours as needed for Nausea or Vomiting 6/23/21  Yes Cande Agustin MD   morphine (MS CONTIN) 15 MG extended release tablet Take 1 tablet by mouth 2 times daily for 30 days.  6/7/21 7/7/21  Kaila George MD   sertraline (ZOLOFT) 100 MG tablet Take 1 tablet by mouth daily 6/7/21 7/7/21  Kaila George MD   ELIQUIS 5 MG TABS tablet Take 1 tablet by mouth daily 5/26/21   Historical Provider, MD   pantoprazole (PROTONIX) 40 MG tablet Take 1 tablet by mouth 2 times daily 5/10/21   Kaila George MD   levETIRAcetam (KEPPRA) 750 MG tablet Take 2 tablets by mouth 2 times daily 5/3/21   Kaila George MD   ferrous sulfate (FE TABS 325) 325 (65 Fe) MG EC tablet Take 1 tablet by mouth daily (with breakfast) 3/12/21 Moshe Gagnon, APRN - NP   aspirin 81 MG chewable tablet Take 81 mg by mouth nightly Pt not taking    Historical Provider, MD     Current Facility-Administered Medications   Medication Dose Route Frequency Provider Last Rate Last Admin    lamoTRIgine (LAMICTAL) tablet 25 mg  25 mg Oral Daily Joy Martinez MD   25 mg at 06/24/21 0859    levETIRAcetam (KEPPRA) tablet 1,500 mg  1,500 mg Oral BID Joy Martinez MD   1,500 mg at 06/24/21 0859    famotidine (PEPCID) tablet 20 mg  20 mg Oral BID Odilon Florez MD   20 mg at 06/24/21 0859    amoxicillin-clavulanate (AUGMENTIN) 875-125 MG per tablet 1 tablet  1 tablet Oral 2 times per day Aleksandar Jeter MD   1 tablet at 06/24/21 0859    ferrous sulfate (FE TABS 325) EC tablet 325 mg  325 mg Oral Daily with breakfast Aleksandar Jeter MD   325 mg at 06/24/21 0859    meclizine (ANTIVERT) tablet 12.5 mg  12.5 mg Oral TID PRN Aleksandar Jeter MD   12.5 mg at 06/24/21 0341    0.9 % sodium chloride infusion   Intravenous PRN Lisha Earl MD        sertraline (ZOLOFT) tablet 100 mg  100 mg Oral Daily [place] Betters, DO   100 mg at 06/24/21 0859    morphine (MS CONTIN) extended release tablet 15 mg  15 mg Oral 2 times per day Samantha Betters, DO   15 mg at 06/24/21 0915    apixaban (ELIQUIS) tablet 5 mg  5 mg Oral Daily Samantha Betters, DO   5 mg at 06/24/21 0859    oxyCODONE-acetaminophen (PERCOCET) 5-325 MG per tablet 1 tablet  1 tablet Oral Q4H PRN [place] Bet[place], DO   1 tablet at 06/24/21 0929    sodium chloride flush 0.9 % injection 5-40 mL  5-40 mL Intravenous 2 times per day [place] Betters, DO   10 mL at 06/23/21 0836    sodium chloride flush 0.9 % injection 5-40 mL  5-40 mL Intravenous PRN Samantha Betters, DO        0.9 % sodium chloride infusion  25 mL Intravenous PRN [place] Bet[place], DO        ondansetron (ZOFRAN-ODT) disintegrating tablet 4 mg  4 mg Oral Q8H PRN Samantha Betters, DO        Or    ondansetron (ZOFRAN) injection 4 mg  4 mg Intravenous Q6H PRN Erby Slicker, DO   4 mg at 21 1414    polyethylene glycol (GLYCOLAX) packet 17 g  17 g Oral Daily PRN Erby Slicker, DO        acetaminophen (TYLENOL) tablet 650 mg  650 mg Oral Q6H PRN Erby Slicker, DO   650 mg at 21 1937    Or    acetaminophen (TYLENOL) suppository 650 mg  650 mg Rectal Q6H PRN Erby Slicker, DO        fentaNYL (SUBLIMAZE) injection 100 mcg  100 mcg Intravenous Q1H PRN Carroll Mcclure MD   100 mcg at 21 0551    aspirin chewable tablet 324 mg  324 mg Oral Once Nick Hawa Rosado, DO           Allergies:  Ceftriaxone    Social History:   reports that she has been smoking cigarettes. She has been smoking about 1.00 pack per day. She uses smokeless tobacco. She reports previous alcohol use. She reports that she does not use drugs. Family History: family history includes Alcohol Abuse in her mother; Cirrhosis in her mother. REVIEW OF SYSTEMS:      Unable to obtain due to altered mental status  PHYSICAL EXAM:        /68   Pulse 90   Temp 98.1 °F (36.7 °C) (Oral)   Resp 16   Ht 5' 2\" (1.575 m)   Wt 160 lb 15 oz (73 kg)   SpO2 97%   BMI 29.44 kg/m²    Temp (24hrs), Av.2 °F (36.8 °C), Min:98 °F (36.7 °C), Max:98.5 °F (36.9 °C)      General appearance ill-appearing. Intubated.   Mental status -intubated and sedated  Eyes - pupils equal and reactive, extraocular eye movements intact   Ears - bilateral TM's and external ear canals normal   Mouth -orally intubated  Neck - supple, no significant adenopathy   Lymphatics - no palpable lymphadenopathy, no hepatosplenomegaly   Chest - clear to auscultation, no wheezes, rales or rhonchi, symmetric air entry   Heart - normal rate, regular rhythm, normal S1, S2, no murmurs  Abdomen - soft, nontender, nondistended, no masses or organomegaly   Neurological -intubated and sedated  Musculoskeletal - no joint tenderness, deformity or swelling   Extremities - peripheral pulses normal, no pedal edema, no clubbing or cyanosis   Skin - normal coloration and turgor, no rashes, no suspicious skin lesions noted ,      DATA:      Labs:     Results for orders placed or performed during the hospital encounter of 06/18/21   COVID-19, Rapid    Specimen: Nasopharyngeal Swab   Result Value Ref Range    Specimen Description . NASOPHARYNGEAL SWAB     SARS-CoV-2, Rapid Not Detected Not Detected   Culture, Blood 1    Specimen: Blood   Result Value Ref Range    Specimen Description . BLOOD     Special Requests R HAND 20ML     Culture NO GROWTH 3 DAYS    Culture, Blood 1    Specimen: Blood   Result Value Ref Range    Specimen Description . BLOOD     Special Requests L HAND 20ML     Culture NO GROWTH 3 DAYS    CBC Auto Differential   Result Value Ref Range    WBC 15.6 (H) 3.5 - 11.3 k/uL    RBC 4.13 3.95 - 5.11 m/uL    Hemoglobin 8.9 (L) 11.9 - 15.1 g/dL    Hematocrit 34.3 (L) 36.3 - 47.1 %    MCV 83.1 82.6 - 102.9 fL    MCH 21.5 (L) 25.2 - 33.5 pg    MCHC 25.9 (L) 28.4 - 34.8 g/dL    RDW 28.4 (H) 11.8 - 14.4 %    Platelets See Reflexed IPF Result 138 - 453 k/uL    MPV NOT REPORTED 8.1 - 13.5 fL    NRBC Automated 0.0 0.0 per 100 WBC    Differential Type NOT REPORTED     WBC Morphology NOT REPORTED     RBC Morphology NOT REPORTED     Platelet Estimate NOT REPORTED     Immature Granulocytes 0 0 %    Seg Neutrophils 55 36 - 66 %    Lymphocytes 27 24 - 44 %    Monocytes 11 (H) 1 - 7 %    Eosinophils % 6 (H) 1 - 4 %    Basophils 1 0 - 2 %    nRBC 1 (H) 0 per 100 WBC    Absolute Immature Granulocyte 0.00 0.00 - 0.30 k/uL    Segs Absolute 8.57 (H) 1.8 - 7.7 k/uL    Absolute Lymph # 4.21 1.0 - 4.8 k/uL    Absolute Mono # 1.72 (H) 0.1 - 0.8 k/uL    Absolute Eos # 0.94 (H) 0.0 - 0.4 k/uL    Basophils Absolute 0.16 0.0 - 0.2 k/uL    Morphology ANISOCYTOSIS PRESENT     Morphology HYPOCHROMIA PRESENT     Morphology 1+ POLYCHROMASIA     Morphology GIANT PLATELETS PRESENT    Comprehensive Metabolic Panel w/ Reflex to MG   Result Value Ref Range Glucose 161 (H) 70 - 99 mg/dL    BUN 8 6 - 20 mg/dL    CREATININE 0.43 (L) 0.50 - 0.90 mg/dL    Bun/Cre Ratio NOT REPORTED 9 - 20    Calcium 8.7 8.6 - 10.4 mg/dL    Sodium 140 135 - 144 mmol/L    Potassium 3.9 3.7 - 5.3 mmol/L    Chloride 103 98 - 107 mmol/L    CO2 18 (L) 20 - 31 mmol/L    Anion Gap 19 (H) 9 - 17 mmol/L    Alkaline Phosphatase 64 35 - 104 U/L    ALT 5 5 - 33 U/L    AST 12 <32 U/L    Total Bilirubin <0.10 (L) 0.3 - 1.2 mg/dL    Total Protein 6.7 6.4 - 8.3 g/dL    Albumin 4.0 3.5 - 5.2 g/dL    Albumin/Globulin Ratio 1.5 1.0 - 2.5    GFR Non-African American >60 >60 mL/min    GFR African American >60 >60 mL/min    GFR Comment          GFR Staging NOT REPORTED    APTT   Result Value Ref Range    PTT 21.0 20.5 - 30.5 sec   Protime-INR   Result Value Ref Range    Protime 10.1 9.1 - 12.3 sec    INR 0.9    Troponin   Result Value Ref Range    Troponin, High Sensitivity 7 0 - 14 ng/L    Troponin T NOT REPORTED <0.03 ng/mL    Troponin Interp NOT REPORTED    Urinalysis Reflex to Culture    Specimen: Urine, clean catch   Result Value Ref Range    Color, UA YELLOW YELLOW    Turbidity UA CLEAR CLEAR    Glucose, Ur NEGATIVE NEGATIVE    Bilirubin Urine NEGATIVE NEGATIVE    Ketones, Urine TRACE (A) NEGATIVE    Specific Gravity, UA 1.035 (H) 1.005 - 1.030    Urine Hgb NEGATIVE NEGATIVE    pH, UA 5.0 5.0 - 8.0    Protein, UA TRACE (A) NEGATIVE    Urobilinogen, Urine Normal Normal    Nitrite, Urine NEGATIVE NEGATIVE    Leukocyte Esterase, Urine NEGATIVE NEGATIVE    Urinalysis Comments NOT REPORTED    Lactic Acid, Plasma   Result Value Ref Range    Lactic Acid NOT REPORTED mmol/L    Lactic Acid, Whole Blood 12.1 (H) 0.7 - 2.1 mmol/L   ELECTROLYTES PLUS   Result Value Ref Range    POC Sodium 143 138 - 146 mmol/L    POC Potassium 3.6 3.5 - 4.5 mmol/L    POC Chloride 108 (H) 98 - 107 mmol/L    POC TCO2 18 (L) 22 - 30 mmol/L    Anion Gap 18 (H) 7 - 16 mmol/L   Hemoglobin and hematocrit, blood   Result Value Ref Range    POC Hemoglobin 12.1 12.0 - 16.0 g/dL    POC Hematocrit 36 36 - 46 %   CALCIUM, IONIC (POC)   Result Value Ref Range    POC Ionized Calcium 1.14 (L) 1.15 - 1.33 mmol/L   Levetiracetam Level   Result Value Ref Range    Levetiracetam Lvl 23 ug/mL   Immature Platelet Fraction   Result Value Ref Range    Platelet, Immature Fraction 2.3 1.1 - 10.3 %    Platelet, Fluorescence 1,382 (HH) 138 - 453 k/uL   Troponin   Result Value Ref Range    Troponin, High Sensitivity 156 (HH) 0 - 14 ng/L    Troponin T NOT REPORTED <0.03 ng/mL    Troponin Interp NOT REPORTED    Microscopic Urinalysis   Result Value Ref Range    -          WBC, UA None 0 - 5 /HPF    RBC, UA 0 TO 2 0 - 4 /HPF    Casts UA NOT REPORTED 0 - 8 /LPF    Crystals, UA NOT REPORTED None /HPF    Epithelial Cells UA 0 TO 2 0 - 5 /HPF    Renal Epithelial, UA NOT REPORTED 0 /HPF    Bacteria, UA NOT REPORTED None    Mucus, UA NOT REPORTED None    Trichomonas, UA NOT REPORTED None    Amorphous, UA NOT REPORTED None    Other Observations UA NOT REPORTED NOT REQ.     Yeast, UA NOT REPORTED None   Procalcitonin   Result Value Ref Range    Procalcitonin 0.05 <0.09 ng/mL   Lactic Acid, Plasma   Result Value Ref Range    Lactic Acid NOT REPORTED mmol/L    Lactic Acid, Whole Blood 2.3 (H) 0.7 - 2.1 mmol/L   Lactic Acid, Plasma   Result Value Ref Range    Lactic Acid NOT REPORTED mmol/L    Lactic Acid, Whole Blood 1.4 0.7 - 2.1 mmol/L   Troponin   Result Value Ref Range    Troponin, High Sensitivity 166 (HH) 0 - 14 ng/L    Troponin T NOT REPORTED <0.03 ng/mL    Troponin Interp NOT REPORTED    Troponin   Result Value Ref Range    Troponin, High Sensitivity 142 (HH) 0 - 14 ng/L    Troponin T NOT REPORTED <0.03 ng/mL    Troponin Interp NOT REPORTED    Basic Metabolic Panel   Result Value Ref Range    Glucose 106 (H) 70 - 99 mg/dL    BUN 6 6 - 20 mg/dL    CREATININE 0.24 (L) 0.50 - 0.90 mg/dL    Bun/Cre Ratio NOT REPORTED 9 - 20    Calcium 8.0 (L) 8.6 - 10.4 mg/dL    Sodium 139 135 - 144 mmol/L    Potassium 3.4 (L) 3.7 - 5.3 mmol/L    Chloride 105 98 - 107 mmol/L    CO2 22 20 - 31 mmol/L    Anion Gap 12 9 - 17 mmol/L    GFR Non-African American >60 >60 mL/min    GFR African American >60 >60 mL/min    GFR Comment          GFR Staging NOT REPORTED    Troponin   Result Value Ref Range    Troponin, High Sensitivity 132 (HH) 0 - 14 ng/L    Troponin T NOT REPORTED <0.03 ng/mL    Troponin Interp NOT REPORTED    CALCIUM, IONIZED   Result Value Ref Range    Calcium, Ion 1.07 (L) 1.13 - 1.33 mmol/L   CBC auto differential   Result Value Ref Range    WBC 19.8 (H) 3.5 - 11.3 k/uL    RBC 3.56 (L) 3.95 - 5.11 m/uL    Hemoglobin 7.7 (L) 11.9 - 15.1 g/dL    Hematocrit 28.6 (L) 36.3 - 47.1 %    MCV 80.3 (L) 82.6 - 102.9 fL    MCH 21.6 (L) 25.2 - 33.5 pg    MCHC 26.9 (L) 28.4 - 34.8 g/dL    RDW 27.7 (H) 11.8 - 14.4 %    Platelets See Reflexed IPF Result 138 - 453 k/uL    MPV NOT REPORTED 8.1 - 13.5 fL    NRBC Automated 0.0 0.0 per 100 WBC    Differential Type NOT REPORTED     WBC Morphology NOT REPORTED     RBC Morphology NOT REPORTED     Platelet Estimate NOT REPORTED     Immature Granulocytes 0 0 %    Seg Neutrophils 82 (H) 36 - 66 %    Lymphocytes 9 (L) 24 - 44 %    Monocytes 9 (H) 1 - 7 %    Eosinophils % 0 (L) 1 - 4 %    Basophils 0 0 - 2 %    Absolute Immature Granulocyte 0.00 0.00 - 0.30 k/uL    Segs Absolute 16.24 (H) 1.8 - 7.7 k/uL    Absolute Lymph # 1.78 1.0 - 4.8 k/uL    Absolute Mono # 1.78 (H) 0.1 - 0.8 k/uL    Absolute Eos # 0.00 0.0 - 0.4 k/uL    Basophils Absolute 0.00 0.0 - 0.2 k/uL    Morphology ANISOCYTOSIS PRESENT     Morphology MICROCYTOSIS PRESENT     Morphology HYPOCHROMIA PRESENT     Morphology 1+ POLYCHROMASIA    Comprehensive Metabolic Panel   Result Value Ref Range    Glucose 102 (H) 70 - 99 mg/dL    BUN 4 (L) 6 - 20 mg/dL    CREATININE 0.23 (L) 0.50 - 0.90 mg/dL    Bun/Cre Ratio NOT REPORTED 9 - 20    Calcium 8.7 8.6 - 10.4 mg/dL    Sodium 137 135 - 144 mmol/L    Potassium 3.9 3.7 - 5.3 mmol/L    Chloride 102 98 - 107 mmol/L    CO2 26 20 - 31 mmol/L    Anion Gap 9 9 - 17 mmol/L    Alkaline Phosphatase 55 35 - 104 U/L    ALT 5 5 - 33 U/L    AST 19 <32 U/L    Total Bilirubin 0.16 (L) 0.3 - 1.2 mg/dL    Total Protein 5.6 (L) 6.4 - 8.3 g/dL    Albumin 3.1 (L) 3.5 - 5.2 g/dL    Albumin/Globulin Ratio 1.2 1.0 - 2.5    GFR Non-African American >60 >60 mL/min    GFR African American >60 >60 mL/min    GFR Comment          GFR Staging NOT REPORTED    Lactic Acid, Plasma   Result Value Ref Range    Lactic Acid NOT REPORTED mmol/L    Lactic Acid, Whole Blood 1.0 0.7 - 2.1 mmol/L   Lactic Acid, Plasma   Result Value Ref Range    Lactic Acid NOT REPORTED mmol/L    Lactic Acid, Whole Blood 1.4 0.7 - 2.1 mmol/L   Magnesium   Result Value Ref Range    Magnesium 1.6 1.6 - 2.6 mg/dL   Phosphorus   Result Value Ref Range    Phosphorus 3.8 2.6 - 4.5 mg/dL   Troponin   Result Value Ref Range    Troponin, High Sensitivity 115 (HH) 0 - 14 ng/L    Troponin T NOT REPORTED <0.03 ng/mL    Troponin Interp NOT REPORTED    Lactic Acid, Plasma   Result Value Ref Range    Lactic Acid NOT REPORTED mmol/L    Lactic Acid, Whole Blood 1.1 0.7 - 2.1 mmol/L   Troponin   Result Value Ref Range    Troponin, High Sensitivity 111 (HH) 0 - 14 ng/L    Troponin T NOT REPORTED <0.03 ng/mL    Troponin Interp NOT REPORTED    Immature Platelet Fraction   Result Value Ref Range    Platelet, Immature Fraction 1.7 1.1 - 10.3 %    Platelet, Fluorescence 1,108 (HH) 138 - 453 k/uL   Lactic Acid, Plasma   Result Value Ref Range    Lactic Acid NOT REPORTED mmol/L    Lactic Acid, Whole Blood 1.0 0.7 - 2.1 mmol/L   Procalcitonin   Result Value Ref Range    Procalcitonin 0.08 <0.09 ng/mL   Path Review, Smear   Result Value Ref Range    Pathologist Review SEE REPORT    Reticulocytes   Result Value Ref Range    Retic % 0.3 (L) 0.5 - 1.9 %    Absolute Retic # 0.010 (L) 0.030 - 0.080 M/uL    Immature Retic Fract 7.600 2.7 - 18.3 %    Retic Hemoglobin 16.3 (L) 28.2 - 35.7 pg   Lactic Acid, Plasma   Result Value Ref Range    Lactic Acid NOT REPORTED mmol/L    Lactic Acid, Whole Blood 0.9 0.7 - 2.1 mmol/L   Lactic Acid, Plasma   Result Value Ref Range    Lactic Acid NOT REPORTED mmol/L    Lactic Acid, Whole Blood 1.2 0.7 - 2.1 mmol/L   Ferritin   Result Value Ref Range    Ferritin 13 13 - 150 ug/L   CBC auto differential   Result Value Ref Range    WBC 14.5 (H) 3.5 - 11.3 k/uL    RBC 3.27 (L) 3.95 - 5.11 m/uL    Hemoglobin 6.8 (LL) 11.9 - 15.1 g/dL    Hematocrit 26.7 (L) 36.3 - 47.1 %    MCV 81.7 (L) 82.6 - 102.9 fL    MCH 20.8 (L) 25.2 - 33.5 pg    MCHC 25.5 (L) 28.4 - 34.8 g/dL    RDW 27.9 (H) 11.8 - 14.4 %    Platelets See Reflexed IPF Result 138 - 453 k/uL    MPV NOT REPORTED 8.1 - 13.5 fL    NRBC Automated 0.0 0.0 per 100 WBC    Differential Type NOT REPORTED     WBC Morphology NOT REPORTED     RBC Morphology NOT REPORTED     Platelet Estimate NOT REPORTED     Immature Granulocytes 0 0 %    Seg Neutrophils 85 (H) 36 - 66 %    Lymphocytes 4 (L) 24 - 44 %    Monocytes 10 (H) 1 - 7 %    Eosinophils % 0 (L) 1 - 4 %    Basophils 1 0 - 2 %    Absolute Immature Granulocyte 0.00 0.00 - 0.30 k/uL    Segs Absolute 12.32 (H) 1.8 - 7.7 k/uL    Absolute Lymph # 0.58 (L) 1.0 - 4.8 k/uL    Absolute Mono # 1.45 (H) 0.1 - 0.8 k/uL    Absolute Eos # 0.00 0.0 - 0.4 k/uL    Basophils Absolute 0.15 0.0 - 0.2 k/uL    Morphology MICROCYTOSIS PRESENT     Morphology ANISOCYTOSIS PRESENT     Morphology HYPOCHROMIA PRESENT     Morphology 1+ POLYCHROMASIA    Comprehensive Metabolic Panel   Result Value Ref Range    Glucose 82 70 - 99 mg/dL    BUN 3 (L) 6 - 20 mg/dL    CREATININE 0.23 (L) 0.50 - 0.90 mg/dL    Bun/Cre Ratio NOT REPORTED 9 - 20    Calcium 8.2 (L) 8.6 - 10.4 mg/dL    Sodium 139 135 - 144 mmol/L    Potassium 3.4 (L) 3.7 - 5.3 mmol/L    Chloride 105 98 - 107 mmol/L    CO2 26 20 - 31 mmol/L    Anion Gap 8 (L) 9 - 17 mmol/L    Alkaline Phosphatase 52 35 - 104 U/L    ALT 5 5 - 33 U/L AST 14 <32 U/L    Total Bilirubin 0.21 (L) 0.3 - 1.2 mg/dL    Total Protein 5.5 (L) 6.4 - 8.3 g/dL    Albumin 3.1 (L) 3.5 - 5.2 g/dL    Albumin/Globulin Ratio 1.3 1.0 - 2.5    GFR Non-African American >60 >60 mL/min    GFR African American >60 >60 mL/min    GFR Comment          GFR Staging NOT REPORTED    Magnesium   Result Value Ref Range    Magnesium 1.8 1.6 - 2.6 mg/dL   Phosphorus   Result Value Ref Range    Phosphorus 2.8 2.6 - 4.5 mg/dL   Lactic Acid, Plasma   Result Value Ref Range    Lactic Acid NOT REPORTED mmol/L    Lactic Acid, Whole Blood 1.1 0.7 - 2.1 mmol/L   Immature Platelet Fraction   Result Value Ref Range    Platelet, Immature Fraction 1.4 1.1 - 10.3 %    Platelet, Fluorescence 1,109 (HH) 138 - 453 k/uL   HEMOGLOBIN AND HEMATOCRIT, BLOOD   Result Value Ref Range    Hemoglobin 6.9 (LL) 11.9 - 15.1 g/dL    Hematocrit 25.7 (L) 36.3 - 47.1 %   HEMOGLOBIN AND HEMATOCRIT, BLOOD   Result Value Ref Range    Hemoglobin 8.0 (L) 11.9 - 15.1 g/dL    Hematocrit 28.7 (L) 36.3 - 47.1 %   CBC auto differential   Result Value Ref Range    WBC 14.8 (H) 3.5 - 11.3 k/uL    RBC 3.61 (L) 3.95 - 5.11 m/uL    Hemoglobin 8.0 (L) 11.9 - 15.1 g/dL    Hematocrit 29.9 (L) 36.3 - 47.1 %    MCV 82.8 82.6 - 102.9 fL    MCH 22.2 (L) 25.2 - 33.5 pg    MCHC 26.8 (L) 28.4 - 34.8 g/dL    RDW 25.6 (H) 11.8 - 14.4 %    Platelets See Reflexed IPF Result 138 - 453 k/uL    MPV NOT REPORTED 8.1 - 13.5 fL    NRBC Automated 0.0 0.0 per 100 WBC    Differential Type NOT REPORTED     WBC Morphology NOT REPORTED     RBC Morphology NOT REPORTED     Platelet Estimate NOT REPORTED     Immature Granulocytes 1 (H) 0 %    Seg Neutrophils 68 (H) 36 - 65 %    Lymphocytes 11 (L) 24 - 43 %    Monocytes 13 (H) 3 - 12 %    Eosinophils % 6 (H) 1 - 4 %    Basophils 1 0 - 2 %    Absolute Immature Granulocyte 0.15 0.00 - 0.30 k/uL    Segs Absolute 10.06 (H) 1.50 - 8.10 k/uL    Absolute Lymph # 1.63 1.10 - 3.70 k/uL    Absolute Mono # 1.92 (H) 0.10 - 1.20 k/uL ANEMIA. SEVERE THROMBOCYTOSIS. THROMBOCYTOSIS  DIFFERENTIAL IS BROAD AND INCLUDES ACUTE BLOOD LOSS, IRON DEFICIENCY  ANEMIA, INFECTION AND OTHER INFLAMMATORY STATES, TRAUMA, MYELOID  NEOPLASM, OTHER MALIGNANCIES, ETC.    RESULTS-COMMENTS  PERIPHERAL BLOOD STUDY    CBC: Please see the electronic health record for CBC parameters  ([id number], 6/19/2021, 5:03). PLATELETS: Platelets  show normal morphology. LEUKOCYTES: White blood cells show normal morphology. There are no  blasts. ERYTHROCYTES: Red blood cells show microcytosis, hypochromia,  anisocytosis. Kavita Limon M.D. Source:  1: PERIPHERAL BLOOD FOR REVIEW BY PATHOLOGIST     URINALYSIS WITH MICROSCOPIC   Result Value Ref Range    Color, UA YELLOW YELLOW    Turbidity UA CLEAR CLEAR    Glucose, Ur NEGATIVE NEGATIVE    Bilirubin Urine NEGATIVE NEGATIVE    Ketones, Urine NEGATIVE NEGATIVE    Specific Gravity, UA 1.012 1.005 - 1.030    Urine Hgb NEGATIVE NEGATIVE    pH, UA 8.0 5.0 - 8.0    Protein, UA NEGATIVE NEGATIVE    Urobilinogen, Urine Normal Normal    Nitrite, Urine NEGATIVE NEGATIVE    Leukocyte Esterase, Urine NEGATIVE NEGATIVE    -          WBC, UA None 0 - 5 /HPF    RBC, UA 0 TO 2 0 - 4 /HPF    Casts UA NOT REPORTED 0 - 8 /LPF    Crystals, UA NOT REPORTED None /HPF    Epithelial Cells UA None 0 - 5 /HPF    Renal Epithelial, UA NOT REPORTED 0 /HPF    Bacteria, UA NOT REPORTED None    Mucus, UA NOT REPORTED None    Trichomonas, UA NOT REPORTED None    Amorphous, UA NOT REPORTED None    Other Observations UA NOT REPORTED NOT REQ.     Yeast, UA NOT REPORTED None   CBC auto differential   Result Value Ref Range    WBC 15.6 (H) 3.5 - 11.3 k/uL    RBC 3.60 (L) 3.95 - 5.11 m/uL    Hemoglobin 7.9 (L) 11.9 - 15.1 g/dL    Hematocrit 29.7 (L) 36.3 - 47.1 %    MCV 82.5 (L) 82.6 - 102.9 fL    MCH 21.9 (L) 25.2 - 33.5 pg    MCHC 26.6 (L) 28.4 - 34.8 g/dL    RDW 26.8 (H) 11.8 - 14.4 %    Platelets See Reflexed IPF Result 138 - 453 k/uL    MPV NOT REPORTED 8.1 - 13.5 fL    NRBC Automated 0.0 0.0 per 100 WBC    Differential Type NOT REPORTED     WBC Morphology NOT REPORTED     RBC Morphology NOT REPORTED     Platelet Estimate NOT REPORTED     Immature Granulocytes 1 (H) 0 %    Seg Neutrophils 66 (H) 36 - 65 %    Lymphocytes 15 (L) 24 - 43 %    Monocytes 12 3 - 12 %    Eosinophils % 5 (H) 1 - 4 %    Basophils 1 0 - 2 %    Absolute Immature Granulocyte 0.16 0.00 - 0.30 k/uL    Segs Absolute 10.29 (H) 1.50 - 8.10 k/uL    Absolute Lymph # 2.34 1.10 - 3.70 k/uL    Absolute Mono # 1.87 (H) 0.10 - 1.20 k/uL    Absolute Eos # 0.78 (H) 0.00 - 0.44 k/uL    Basophils Absolute 0.16 0.00 - 0.20 k/uL    Morphology ANISOCYTOSIS PRESENT     Morphology MICROCYTOSIS PRESENT     Morphology HYPOCHROMIA PRESENT     Morphology 1+ POLYCHROMASIA    Comprehensive Metabolic Panel   Result Value Ref Range    Glucose 90 70 - 99 mg/dL    BUN 8 6 - 20 mg/dL    CREATININE 0.32 (L) 0.50 - 0.90 mg/dL    Bun/Cre Ratio NOT REPORTED 9 - 20    Calcium 8.5 (L) 8.6 - 10.4 mg/dL    Sodium 142 135 - 144 mmol/L    Potassium 4.0 3.7 - 5.3 mmol/L    Chloride 106 98 - 107 mmol/L    CO2 24 20 - 31 mmol/L    Anion Gap 12 9 - 17 mmol/L    Alkaline Phosphatase 51 35 - 104 U/L    ALT 5 5 - 33 U/L    AST 25 <32 U/L    Total Bilirubin <0.10 (L) 0.3 - 1.2 mg/dL    Total Protein 5.7 (L) 6.4 - 8.3 g/dL    Albumin 3.1 (L) 3.5 - 5.2 g/dL    Albumin/Globulin Ratio 1.2 1.0 - 2.5    GFR Non-African American >60 >60 mL/min    GFR African American >60 >60 mL/min    GFR Comment          GFR Staging NOT REPORTED    Magnesium   Result Value Ref Range    Magnesium 1.8 1.6 - 2.6 mg/dL   Phosphorus   Result Value Ref Range    Phosphorus 3.6 2.6 - 4.5 mg/dL   Immature Platelet Fraction   Result Value Ref Range    Platelet, Immature Fraction 1.2 1.1 - 10.3 %    Platelet, Fluorescence 1,225 () 138 - 453 k/uL   Magnesium   Result Value Ref Range    Magnesium 1.8 1.6 - 2.6 mg/dL   CBC Result Value Ref Range    WBC 15.0 (H) 3.5 - 11.3 k/uL    RBC 3.77 (L) 3.95 - 5.11 m/uL    Hemoglobin 8.5 (L) 11.9 - 15.1 g/dL    Hematocrit 31.5 (L) 36.3 - 47.1 %    MCV 83.6 82.6 - 102.9 fL    MCH 22.5 (L) 25.2 - 33.5 pg    MCHC 27.0 (L) 28.4 - 34.8 g/dL    RDW 28.7 (H) 11.8 - 14.4 %    Platelets See Reflexed IPF Result 138 - 453 k/uL    MPV NOT REPORTED 8.1 - 13.5 fL    NRBC Automated 0.0 0.0 per 100 WBC   BASIC METABOLIC PANEL   Result Value Ref Range    Glucose 104 (H) 70 - 99 mg/dL    BUN 13 6 - 20 mg/dL    CREATININE 0.37 (L) 0.50 - 0.90 mg/dL    Bun/Cre Ratio NOT REPORTED 9 - 20    Calcium 8.3 (L) 8.6 - 10.4 mg/dL    Sodium 141 135 - 144 mmol/L    Potassium 4.1 3.7 - 5.3 mmol/L    Chloride 106 98 - 107 mmol/L    CO2 28 20 - 31 mmol/L    Anion Gap 7 (L) 9 - 17 mmol/L    GFR Non-African American >60 >60 mL/min    GFR African American >60 >60 mL/min    GFR Comment          GFR Staging NOT REPORTED    Immature Platelet Fraction   Result Value Ref Range    Platelet, Immature Fraction 1.2 1.1 - 10.3 %    Platelet, Fluorescence 1,172 (HH) 138 - 453 k/uL   Magnesium   Result Value Ref Range    Magnesium 1.9 1.6 - 2.6 mg/dL   Venous Blood Gas, POC   Result Value Ref Range    pH, Steffen 7.195 (LL) 7.320 - 7.430    pCO2, Steffen 42.9 41.0 - 51.0 mm Hg    pO2, Steffen 77.5 (H) 30 - 50 mm Hg    HCO3, Venous 16.6 (L) 22.0 - 29.0 mmol/L    Total CO2, Venous NOT REPORTED 23.0 - 30.0 mmol/L    Negative Base Excess, Steffen 11 (H) 0.0 - 2.0    Positive Base Excess, Steffen NOT REPORTED 0.0 - 3.0    O2 Sat, Steffen 92 (H) 60.0 - 85.0 %    O2 Device/Flow/% NOT REPORTED     Joe Test NOT REPORTED     Sample Site NOT REPORTED     Mode NOT REPORTED     FIO2 NOT REPORTED     Pt Temp NOT REPORTED     POC pH Temp NOT REPORTED     POC pCO2 Temp NOT REPORTED mm Hg    POC pO2 Temp NOT REPORTED mm Hg   Creatinine W/GFR Point of Care   Result Value Ref Range    POC Creatinine 0.58 0.51 - 1.19 mg/dL    GFR Comment >60 >60 mL/min    GFR Non- >60 >60 mL/min    GFR Comment         POCT urea (BUN)   Result Value Ref Range    POC BUN 8 8 - 26 mg/dL   Lactic Acid, POC   Result Value Ref Range    POC Lactic Acid 9.98 (H) 0.56 - 1.39 mmol/L   POCT Glucose   Result Value Ref Range    POC Glucose 159 (H) 74 - 100 mg/dL   Arterial Blood Gas, POC   Result Value Ref Range    POC pH 7.319 (L) 7.350 - 7.450    POC pCO2 42.5 35.0 - 48.0 mm Hg    POC PO2 173.9 (H) 83.0 - 108.0 mm Hg    POC HCO3 21.8 21.0 - 28.0 mmol/L    TCO2 (calc), Art NOT REPORTED 22.0 - 29.0 mmol/L    Negative Base Excess, Art 4 (H) 0.0 - 2.0    Positive Base Excess, Art NOT REPORTED 0.0 - 3.0    POC O2 SAT 99 (H) 94.0 - 98.0 %    O2 Device/Flow/% Adult Ventilator     Joe Test POSITIVE     Sample Site Left Radial Artery     Mode PRVC     FIO2 50.0     Pt Temp NOT REPORTED     POC pH Temp NOT REPORTED     POC pCO2 Temp NOT REPORTED mm Hg    POC pO2 Temp NOT REPORTED mm Hg   Arterial Blood Gas, POC   Result Value Ref Range    POC pH 7.426 7.350 - 7.450    POC pCO2 42.5 35.0 - 48.0 mm Hg    POC PO2 98.7 83.0 - 108.0 mm Hg    POC HCO3 28.0 21.0 - 28.0 mmol/L    TCO2 (calc), Art NOT REPORTED 22.0 - 29.0 mmol/L    Negative Base Excess, Art NOT REPORTED 0.0 - 2.0    Positive Base Excess, Art 3 0.0 - 3.0    POC O2 SAT 98 94.0 - 98.0 %    O2 Device/Flow/% Adult Ventilator     Joe Test POSITIVE     Sample Site Left Radial Artery     Mode NOT REPORTED     FIO2 30.0     Pt Temp NOT REPORTED     POC pH Temp NOT REPORTED     POC pCO2 Temp NOT REPORTED mm Hg    POC pO2 Temp NOT REPORTED mm Hg   EKG 12 Lead   Result Value Ref Range    Ventricular Rate 129 BPM    Atrial Rate 129 BPM    P-R Interval 148 ms    QRS Duration 88 ms    Q-T Interval 316 ms    QTc Calculation (Bazett) 462 ms    P Axis 74 degrees    R Axis 39 degrees    T Axis 92 degrees   EKG 12 Lead   Result Value Ref Range    Ventricular Rate 98 BPM    Atrial Rate 98 BPM    P-R Interval 166 ms    QRS Duration 82 ms    Q-T Interval 362 ms    QTc Calculation (Bazett) 462 ms    P Axis 60 degrees    R Axis 26 degrees    T Axis 74 degrees   TYPE AND SCREEN   Result Value Ref Range    Expiration Date 06/23/2021,2359     Arm Band Number BE [id number]     ABO/Rh A POSITIVE     Antibody Screen NOT TESTED     Antibody ID [place] Present  PREVIOUSLY IDENTIFIED       AMBER IgG NEGATIVE     Unit Number [id number]     Product Code Leukocyte Reduced Red Cell     Unit Divison 00     Dispense Status TRANSFUSED     Transfusion Status OK TO TRANSFUSE     Crossmatch Result COMPATIBLE     Unit Number [id number]     Product Code Leukocyte Reduced Red Cell     Unit Divison 00     Dispense Status REL FROM [facility]     Transfusion Status OK TO TRANSFUSE     Crossmatch Result COMPATIBLE    BLOOD BANK SPECIMEN   Result Value Ref Range    Blood Bank Specimen NOT REPORTED          IMAGING DATA:    CT Head WO Contrast    Result Date: 6/18/2021  EXAMINATION: CT OF THE HEAD WITHOUT CONTRAST  6/18/2021 9:19 am TECHNIQUE: CT of the head was performed without the administration of intravenous contrast. Dose modulation, iterative reconstruction, and/or weight based adjustment of the mA/kV was utilized to reduce the radiation dose to as low as reasonably achievable. COMPARISON: 03/09/2021 HISTORY: ORDERING SYSTEM PROVIDED HISTORY: stroke TECHNOLOGIST PROVIDED HISTORY: stroke Decision Support Exception - unselect if not a suspected or confirmed emergency medical condition->Emergency Medical Condition (MA) FINDINGS: BRAIN/VENTRICLES: There is no acute infarct or acute intracranial hemorrhage present. There is no mass effect or midline shift present. There is no ventriculomegaly or abnormal extra-axial fluid collection present. ORBITS: Limited evaluation of the orbits is unremarkable. SINUSES: The paranasal sinuses and mastoid air cells are clear. SOFT TISSUES/SKULL:  No lytic or blastic osseous lesions are identified. No acute intracranial process identified.  The findings were sent to the Radiology Results [id number] at 9:41 am on 6/18/2021to be communicated to the Stroke Neurology service. XR CHEST PORTABLE    Result Date: 6/18/2021  EXAMINATION: ONE XRAY VIEW OF THE CHEST 6/18/2021 10:22 am COMPARISON: AP chest from 03/09/2021; CT chest from 03/09/2021 HISTORY: ORDERING SYSTEM PROVIDED HISTORY: post intubation TECHNOLOGIST PROVIDED HISTORY: post intubation History of ovarian cancer and osseous metastases with previous splenic embolization. FINDINGS: ETT tip position satisfactory approximately 4 cm above the alejandro. Enteric tube tip and side hole project below left hemidiaphragm. Right IJ port in unchanged position, with good tip position in the upper right atrium. Coils splenic artery LUQ. Enlarged but stable appearing cardiac silhouette. Mediastinal structures appropriate for slight rotation to the left. Low lung volumes; mildly increased interstitial markings with scattered parenchymal densities and unchanged increased hilar shadows, L>R. Minimal unchanged blunting left costophrenic angle. No new pulmonary or right pleural abnormality. Scattered bony sclerotic foci, compatible with known Mets. No destructive lesion or obvious interval change. Support tubes satisfactory. Similar enlarged susan (known adenopathy), scattered parenchymal densities (known pulmonary metastases) without significant interval change; probable smaller left pleural effusion. CTA HEAD NECK W CONTRAST    Result Date: 6/18/2021  EXAMINATION: CTA OF THE HEAD AND NECK WITH CONTRAST 6/18/2021 9:19 am: TECHNIQUE: CTA of the head and neck was performed with the administration of intravenous contrast. Multiplanar reformatted images are provided for review. MIP images are provided for review. Stenosis of the internal carotid arteries measured using NASCET criteria.  Dose modulation, iterative reconstruction, and/or weight based adjustment of the mA/kV was utilized to reduce the radiation dose to as low No large vessel occlusion, significant stenosis or cerebral aneurysm identified. 2. No significant arterial stenosis identified within the neck. 3. Several foci of gas lucency within the lower left neck. Correlation with any history of attempted left central venous catheter placement is recommended. MRI LIMITED BRAIN    Result Date: 6/18/2021  EXAMINATION: MRI OF THE BRAIN WITHOUT CONTRAST  6/18/2021 11:03 am TECHNIQUE: Multiplanar multisequence MRI of the brain was performed without the administration of intravenous contrast. COMPARISON: October 23, 2020 HISTORY: ORDERING SYSTEM PROVIDED HISTORY: acute right sided weakness, seizures, prior hx of PRES TECHNOLOGIST PROVIDED HISTORY: acute right sided weakness, seizures, prior hx of PRES Reason for Exam: cva, rt sided weakness FINDINGS: INTRACRANIAL STRUCTURES/VENTRICLES: There is no restricted diffusion to suggest acute infarct. No mass effect or midline shift. No evidence of an acute intracranial hemorrhage. The ventricles and sulci are normal in size and configuration. Scattered white matter changes in the subcortical white matter periventricular white matter present. There is no evidence for blood products on gradient imaging. The sellar/suprasellar regions appear unremarkable. The normal signal voids within the major intracranial vessels appear maintained. ORBITS: The visualized portion of the orbits demonstrate no acute abnormality. SINUSES: The visualized paranasal sinuses and mastoid air cells are well aerated. BONES/SOFT TISSUES: The bone marrow signal intensity appears normal. The soft tissues demonstrate no acute abnormality. Mild chronic white matter changes mild volume loss.  No evidence for restricted diffusion to suggest acute infarct         IMPRESSION:   Primary Problem  Status epilepticus [facility])    Active Hospital Problems    Diagnosis Date Noted    Pelvic mass [R19.00]     History of deep venous thrombosis (DVT) of distal vein of left lower extremity: On Eliquis [Z86.718] 06/20/2021    Thrombocytosis (HCC) [D47.3]     Chronic deep vein thrombosis (DVT) of femoral vein of left lower extremity [facility] [I82.512] 03/11/2021    History of ovarian cancer [Z85.43]     Status epilepticus ([facility] [G40.901] 01/04/2021    PRES (posterior reversible encephalopathy syndrome) [I67.83]     Hypochromic anemia [D50.9]     Type 2 diabetes mellitus without complication, without long-term current use of insulin ([facility] [E11.9]     Malignant neoplasm of ovary (HCC) [C56.9] 08/17/2020     Altered mental status  Recurrent ovarian cancer  Thrombocytosis  Iron deficiency  Abdominal pain  Abdominal mass      RECOMMENDATIONS:  1. Clinically improving. No more seizure activities. Currently on Keppra and Lamotrigine   2. Continue current care per neurology  3. Hemoglobin improved after transfusion  4. Leukocytosis is reactive and so is thrombocytosis, I think it will take a while for counts to improve   5.  okay to discharge with home care, we will follow as outpatient   6. OK with palliative care consultation as outpatient       Nigel Stovall MD  Hematologist/Medical [id number] [facility] hematology oncology physicians                              This note is created with the assistance of a speech recognition program.  While intending to generate a document that actually reflects the content of the visit, the document can still have some errors including those of syntax and sound a like substitutions which may escape proof reading. It such instances, actual meaning can be extrapolated by contextual diversion. This note is created with the assistance of a speech recognition program.  While intending to generate a document that actually reflects the content of the visit, the document can still have some errors including those of syntax and sound a like substitutions which may escape proof reading.   It such instances, actual meaning can be extrapolated by contextual diversion.

## 2021-06-24 NOTE — PROGRESS NOTES
status epilepticus and required airway protection on 6/18/2021. Patient was extubated on 6/19/2021. Keppra dose was increased to 1000 mg twice a day. Patient has been followed by hematology oncology as well and also has been transfused 1 unit of PRBCs on 6/20/2021. \"    Review of Systems:     Constitutional:  negative for chills, fevers, sweats  Respiratory:  negative for cough, dyspnea on exertion, shortness of breath, wheezing  Cardiovascular:  negative for chest pain, chest pressure/discomfort, lower extremity edema, palpitations  Gastrointestinal:  negative for abdominal pain, constipation, diarrhea, nausea, vomiting  Neurological:  Positive for dizziness, no headache    Medications: Allergies: Allergies   Allergen Reactions    Ceftriaxone      Had a rash on ceftriaxone December 2020, [place]       Current Meds:   Scheduled Meds:    lamoTRIgine  25 mg Oral Daily    levETIRAcetam  1,500 mg Oral BID    famotidine  20 mg Oral BID    amoxicillin-clavulanate  1 tablet Oral 2 times per day    ferrous sulfate  325 mg Oral Daily with breakfast    sertraline  100 mg Oral Daily    morphine  15 mg Oral 2 times per day    apixaban  5 mg Oral Daily    sodium chloride flush  5-40 mL Intravenous 2 times per day    aspirin  324 mg Oral Once     Continuous Infusions:    sodium chloride      sodium chloride       PRN Meds: meclizine, sodium chloride, oxyCODONE-acetaminophen, sodium chloride flush, sodium chloride, ondansetron **OR** ondansetron, polyethylene glycol, acetaminophen **OR** acetaminophen, fentanNYL    Data:     Past Medical History:   has a past medical history of Anemia, Bleeding, Cervical cancer ([facility] Depression, Diabetes mellitus ([facility] GERD (gastroesophageal reflux disease), Hx of blood clots, Hypertension, Metastatic cancer ([facility] Ovarian cancer ([facility] Post chemo evaluation, and Splenic lesion. Social History:   reports that she has been smoking cigarettes.  She has been smoking about 1.00 pack per day. She uses smokeless tobacco. She reports previous alcohol use. She reports that she does not use drugs. Family History:   Family History   Problem Relation Age of Onset    Alcohol Abuse Mother     Cirrhosis Mother        Vitals:  /68   Pulse 90   Temp 98.1 °F (36.7 °C) (Oral)   Resp 16   Ht 5' 2\" (1.575 m)   Wt 160 lb 15 oz (73 kg)   SpO2 97%   BMI 29.44 kg/m²   Temp (24hrs), Av.2 °F (36.8 °C), Min:98 °F (36.7 °C), Max:98.5 °F (36.9 °C)    No results for input(s): POCGLU in the last 72 hours. I/O (24Hr):     Intake/Output Summary (Last 24 hours) at 2021 1220  Last data filed at 2021 0923  Gross per 24 hour   Intake    Output 500 ml   Net -500 ml       Labs:  Hematology:  Recent Labs     21  0501   WBC 15.6* 15.0*   RBC 3.60* 3.77*   HGB 7.9* 8.5*   HCT 29.7* 31.5*   MCV 82.5* 83.6   MCH 21.9* 22.5*   MCHC 26.6* 27.0*   RDW 26.8* 28.7*   PLT See Reflexed IPF Result See Reflexed IPF Result   MPV NOT REPORTED NOT REPORTED     Chemistry:  Recent Labs     21  0501 21  0452    141 138   K 4.0 4.1 4.3    106 104   CO2 24 28 23   GLUCOSE 90 104* 109*   BUN 8 13 9   CREATININE 0.32* 0.37* 0.29*   MG 1.8 1.8 1.9   ANIONGAP 12 7* 11   LABGLOM >60 >60 >60   GFRAA >60 >60 >60   CALCIUM 8.5* 8.3* 8.4*   PHOS 3.6  --   --      Recent Labs     21   PROT 5.7*   LABALBU 3.1*   AST 25   ALT 5   ALKPHOS 51   BILITOT <0.10*     ABG:  Lab Results   Component Value Date    POCPH 7.426 2021    POCPCO2 42.5 2021    POCPO2 98.7 2021    POCHCO3 28.0 2021    NBEA NOT REPORTED 2021    PBEA 3 2021    CDC4YSB NOT REPORTED 2021    UTPG0BHB 98 2021    FIO2 30.0 2021     Lab Results   Component Value Date/Time    SPECIAL R HAND 20ML 2021 09:32 AM     Lab Results   Component Value Date/Time    CULTURE NO GROWTH 3 DAYS 2021 09:32 AM       Radiology: CT Head WO Contrast    Result Date: 6/18/2021  No acute intracranial process identified. The findings were sent to the Radiology Results [id number] at 9:41 am on 6/18/2021to be communicated to the Stroke Neurology service. XR CHEST PORTABLE    Result Date: 6/21/2021  Left basilar patchy opacities, probably atelectatic with a small pleural effusion. Developing infiltrate at the left base should be considered. Elevated right hemidiaphragm with probable right medial basilar atelectasis. Cardiomegaly. Otherwise stable findings. RECOMMENDATION: Short-term follow-up chest x-ray. XR CHEST PORTABLE    Result Date: 6/19/2021  No significant change in chest findings. XR CHEST PORTABLE    Result Date: 6/18/2021  Support tubes satisfactory. Similar enlarged susan (known adenopathy), scattered parenchymal densities (known pulmonary metastases) without significant interval change; probable smaller left pleural effusion. CTA HEAD NECK W CONTRAST    Result Date: 6/18/2021  1. No large vessel occlusion, significant stenosis or cerebral aneurysm identified. 2. No significant arterial stenosis identified within the neck. 3. Several foci of gas lucency within the lower left neck. Correlation with any history of attempted left central venous catheter placement is recommended. MRI LIMITED BRAIN    Result Date: 6/18/2021  Mild chronic white matter changes mild volume loss.  No evidence for restricted diffusion to suggest acute infarct       Physical Examination:        General appearance:  alert, cooperative and no distress  Mental Status:  oriented to person, place and time and normal affect  Lungs:  clear to auscultation bilaterally, normal effort  Heart:  regular rate and rhythm, no murmur  Abdomen:  soft, nontender, nondistended, normal bowel sounds, no masses, hepatomegaly, splenomegaly  Extremities:  no edema, redness, tenderness in the calves  Skin:  Left Facial eschar, healing    Assessment:

## 2021-06-24 NOTE — CARE COORDINATION
Received call from [facility].; unable to accept patient d/t insurance. Received call for patient's RN regarding patient declining HC needs. CM spoke with Patient to educate her on benefits of HC. Patient states she \"doesn't care what she has\" and that she's \"going home and not waiting for anything. \" CM asked patient if CM is able to send a mass referral to [facility]. agencies accepting patients at this time; patient agreeable. 1510 Left  for [facility] at [facility] to follow up on Referral.   Spoke with Fernando Hanks at [facility]; unable to accept d/t insurance. 1530 call back from [facility]; patient is current with them and able to accept patient. CM left  patient to notify her of above.

## 2021-06-24 NOTE — PROGRESS NOTES
I informed pt that her first choice for [facility], [facility], had been declined by her insurance company, per our , and asked pt if she could choose another company instead. Pt has now now declined [facility] and stated she will \"deal with it when she gets out\". I told pt it is easier to set up [facility] while a patient in the hospital, she stated \"no that's ok\". Pt called son to picked up. Scripts sent to her home pharmacy, [facility], per her request. Will review d/c instructions.

## 2021-06-24 NOTE — PLAN OF CARE
Problem: OXYGENATION/RESPIRATORY FUNCTION  Goal: Patient will maintain patent airway  Outcome: Ongoing     Problem: OXYGENATION/RESPIRATORY FUNCTION  Goal: Patient will achieve/maintain normal respiratory rate/effort  Description: Respiratory rate and effort will be within normal limits for the patient  Outcome: Ongoing     Problem: SKIN INTEGRITY  Goal: Skin integrity is maintained or improved  Outcome: Ongoing     Problem: Skin Integrity:  Goal: Will show no infection signs and symptoms  Description: Will show no infection signs and symptoms  Outcome: Ongoing     Problem: Skin Integrity:  Goal: Absence of new skin breakdown  Description: Absence of new skin breakdown  Outcome: Ongoing     Problem: Falls - Risk of:  Goal: Will remain free from falls  Description: Will remain free from falls  Outcome: Ongoing     Problem: Falls - Risk of:  Goal: Absence of physical injury  Description: Absence of physical injury  Outcome: Ongoing     Problem: Nutrition  Goal: Optimal nutrition therapy  Outcome: Ongoing     Problem: Pain:  Goal: Pain level will decrease  Description: Pain level will decrease  Outcome: Ongoing     Problem: Pain:  Goal: Control of acute pain  Description: Control of acute pain  Outcome: Ongoing    Care plan in progress.

## 2021-06-24 NOTE — PROGRESS NOTES
[id number] [facility] Neurology   [place] [address]      Neurology progress note            Date:   6/24/2021  Patient name:  Diandra Guerra  Date of admission:  6/18/2021  YOB: 1965      Chief Complaint:     Chief Complaint   Patient presents with    Cerebrovascular Accident     brought in per EMS        Reason for Consult:      Status epilepticus    History of Present Illness:      The patient is a 60 y.o. 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,   In route by EMS patient had an episode of seizure-like activity administered 2 mg of Versed, resolution of seizure-like activity, patient was on Eliquis for prophylaxis secondary to her high risk of venous thrombosis secondary to known cancer,, patient in the emergency department had 2 episodes of generalized tonic-clonic seizures which aborted by 2 mg of Ativan patient was loaded with 1 g of Keppra and she subsequently underwent an MRI of the negative CT and CTA head and neck, MRI was also negative for any infarct, patient was intubated then after stabilizing her case patient got extubated on 3 L of nasal cannula oxygen, hemoglobin dropped to 6.8 secondary to chemotherapy, heme-onc is following, 1 unit of red blood cells has been administered, patient currently on 1000 g of Keppra twice daily,    There is a troponin elevation downtrending levels, EKG was unremarkable, patient is asymptomatic, elevated levels secondary to seizures, patient stabilized and transferred out of the ICU, under internal medicine primary team    Past Medical History:     Past Medical History:   Diagnosis Date    Anemia     Bleeding 10/2020 intra-abdominal bleeding -due to splenic mass with GI infiltration. Status post embolization    Cervical [facility] ([facility] [address]     Depression     Diabetes mellitus ([facility] [address]     GERD (gastroesophageal reflux disease)     Hx of blood clots     Hypertension     Metastatic cancer ([facility] [address] 10/2020    extensive intraabdominal and splenic involvement and lung mets.  Ovarian cancer ([facility] [address]     low grade serous ovarian carcinoma    Post chemo evaluation     2007: Chemo via med onc (Dr. Carmen Rangel), 2008: Almer Nephew due to rising CA-125, 2013: intraperitoneal chemo,12/2015: Ca125 - 25     Splenic lesion         Past Surgical History:     Past Surgical History:   Procedure Laterality Date    ABSCESS DRAINAGE  2013    Franca rectal    ANUS SURGERY      ANAL FISSURECTOMY    CARDIAC CATHETERIZATION      COLECTOMY  03/2013    ex lap, tumor debulking, transverse colectomy w reanastamosis, subgastric omentectomy, intraperitoneal port placement    HYSTERECTOMY, TOTAL ABDOMINAL      IR EMBOLIZATION HEMORRHAGE  10/05/2020    intra-abdominal bleeding -due to splenic mass with GI infiltration. Status post embolization [employer] interlock coils x7. mri condtional 3t ok, safe immediately post implant.  IR PORT PLACEMENT EQUAL OR GREATER THAN 5 YEARS  08/24/2020    IR PORT PLACEMENT EQUAL OR GREATER THAN 5 YEARS 8/24/2020 Sherry Mann MD [facility] SPECIAL PROCEDURES    PORT SURGERY      IP Port    TONSILLECTOMY          Medications Prior to Admission:     Prior to Admission medications    Medication Sig Start Date End Date Taking? Authorizing Provider   oxyCODONE-acetaminophen (PERCOCET) 5-325 MG per tablet Take 1 tablet by mouth every 6 hours as needed for Pain (breakthrough pain) for up to 5 days.  6/23/21 6/28/21 Yes Marie Eason MD   amoxicillin-clavulanate (AUGMENTIN) 875-125 MG per tablet Take 1 tablet by mouth every 12 hours for 4 days 6/23/21 6/27/21 Yes Marie Eason MD   lamoTRIgine (LAMICTAL) 25 MG tablet Take 1 tablet by mouth daily 6/24/21  Yes Alexandra Jamison MD   meclizine (ANTIVERT) 12.5 MG tablet Take 1 tablet by mouth 3 times daily as needed for Dizziness 6/23/21 7/3/21 Yes Alexandra Jamison MD   ondansetron (ZOFRAN-ODT) 4 MG disintegrating tablet Take 1 tablet by mouth every 8 hours as needed for Nausea or Vomiting 6/23/21  Yes Alexandra Jamison MD   morphine (MS CONTIN) 15 MG extended release tablet Take 1 tablet by mouth 2 times daily for 30 days. 6/7/21 7/7/21  Carolyne Tapia MD   sertraline (ZOLOFT) 100 MG tablet Take 1 tablet by mouth daily 6/7/21 7/7/21  Carolyne Tapia MD   ELIQUIS 5 MG TABS tablet Take 1 tablet by mouth daily 5/26/21   Historical Provider, MD   pantoprazole (PROTONIX) 40 MG tablet Take 1 tablet by mouth 2 times daily 5/10/21   Carolyne Tapia MD   levETIRAcetam (KEPPRA) 750 MG tablet Take 2 tablets by mouth 2 times daily 5/3/21   Carolyne Tapia MD   ferrous sulfate (FE TABS 325) 325 (65 Fe) MG EC tablet Take 1 tablet by mouth daily (with breakfast) 3/12/21   ESTRELLITA Shah NP   aspirin 81 MG chewable tablet Take 81 mg by mouth nightly Pt not taking    Historical Provider, MD        Allergies:     Ceftriaxone    Social History:     Tobacco:    reports that she has been smoking cigarettes. She has been smoking about 1.00 pack per day. She uses smokeless tobacco.  Alcohol:      reports previous alcohol use. Drug Use:  reports no history of drug use.     Family History:     Family History   Problem Relation Age of Onset    Alcohol Abuse Mother     Cirrhosis Mother        Review of Systems:       Constitutional Negative for fever and chills   HEENT Negative for ear discharge, ear pain, nosebleed   Eyes Negative for photophobia, pain and discharge   Respiratory Negative for hemoptysis and sputum   Cardiovascular Negative for orthopnea, claudication and PND   Gastrointestinal Negative for abdominal pain, diarrhea, blood in stool   Musculoskeletal Negative for joint pain, negative for myalgia   Skin Negative for rash or itching   hematology Negative for ecchymosis, anemia   Psychiatric Negative for suicidal ideation, anxiety, depression, hallucinations       Physical Exam:   BP (!) 116/56   Pulse 76   Temp 98 °F (36.7 °C)   Resp 14   Ht 5' 2\" (1.575 m)   Wt 160 lb 15 oz (73 kg)   SpO2 96%   BMI 29.44 kg/m²   Temp (24hrs), Av.3 °F (36.8 °C), Min:98 °F (36.7 °C), Max:98.5 °F (36.9 °C)        General examination:      General Appearance:  alert, well appearing, and in no acute distress  HEENT: Normocephalic, atraumatic, moist mucus membranes  Neck: supple, no carotid bruits, (-) nuchal rigidity  Lungs:  Respirations unlabored, chest wall no deformity, BS normal  Cardiovascular: normal rate, regular rhythm  Abdomen: Soft, nontender, nondistended, normal bowel sounds  Skin: No gross lesions, rashes, bruising or bleeding on exposed skin area  Extremities:  peripheral pulses palpable, no cyanosis, clubbing or edema  Psych: normal affect      Neurological examination:      Mental status   Alert and oriented x 3; following all commands;   speech is fluent, no dysarthria, aphasia. Cranial nerves   II - visual fields intact to confrontation; pupils reactive  III, IV, VI  extraocular muscles intact; no JOSE A; no nystagmus; no ptosis   V - normal facial sensation                                                               VII - normal facial symmetry                                                             VIII - intact hearing                                                                             IX, X - symmetrical palate elevation                                               XI - symmetrical shoulder shrug                                                       XII - midline tongue without atrophy or fasciculation     Motor function  Strength:   5/5 RUE, 5/5 RLE  5/5 LUE, 5/5  LLE  Normal bulk and tone.       Sensory function Intact to touch, pin, vibration, proprioception throughout     Cerebellar Intact finger-nose-finger testing. Intact heel-shin testing. No dysdiadochokinesia present. No tremors                        Reflex function 2/4 symmetric throughout . Downgoing plantar response bilaterally. (-)Spear's sign bilaterally      Gait                  Not tested           Diagnostics:      Laboratory Testing:  CBC:   Recent Labs     06/22/21  0414 06/23/21  0501   WBC 15.6* 15.0*   HGB 7.9* 8.5*   PLT See Reflexed IPF Result See Reflexed IPF Result     BMP:    Recent Labs     06/22/21  0414 06/23/21  0501    141   K 4.0 4.1    106   CO2 24 28   BUN 8 13   CREATININE 0.32* 0.37*   GLUCOSE 90 104*         Lab Results   Component Value Date    CHOL 131 03/10/2021    LDLCHOLESTEROL 69 03/10/2021    HDL 33 (L) 03/10/2021    TRIG 147 03/10/2021    ALT 5 06/22/2021    AST 25 06/22/2021    TSH 2.93 03/09/2021    INR 0.9 06/18/2021    LABA1C 6.0 03/10/2021    [id number] 405 10/22/2020       No results found for: PHENYTOIN, PHENYTOIN, VALPROATE, CBMZ      Imaging/Diagnostics:  CT Head WO Contrast    Result Date: 6/18/2021  EXAMINATION: CT OF THE HEAD WITHOUT CONTRAST  6/18/2021 9:19 am TECHNIQUE: CT of the head was performed without the administration of intravenous contrast. Dose modulation, iterative reconstruction, and/or weight based adjustment of the mA/kV was utilized to reduce the radiation dose to as low as reasonably achievable. COMPARISON: 03/09/2021 HISTORY: ORDERING SYSTEM PROVIDED HISTORY: stroke TECHNOLOGIST PROVIDED HISTORY: stroke Decision Support Exception - unselect if not a suspected or confirmed emergency medical condition->Emergency Medical Condition (MA) FINDINGS: BRAIN/VENTRICLES: There is no acute infarct or acute intracranial hemorrhage present. There is no mass effect or midline shift present. There is no ventriculomegaly or abnormal extra-axial fluid collection present. ORBITS: Limited evaluation of the orbits is unremarkable.  SINUSES: The paranasal sinuses and mastoid air cells are clear. SOFT TISSUES/SKULL:  No lytic or blastic osseous lesions are identified. No acute intracranial process identified. The findings were sent to the Radiology Results [id number] at 9:41 am on 6/18/2021to be communicated to the Stroke Neurology service. XR CHEST PORTABLE    Result Date: 6/21/2021  EXAMINATION: ONE XRAY VIEW OF THE CHEST 6/21/2021 10:50 am COMPARISON: Previous chest x-ray from 06/19/2021 HISTORY: ORDERING SYSTEM PROVIDED HISTORY: PNA rule out TECHNOLOGIST PROVIDED HISTORY: PNA rule out Cervical cancer, diabetes, GERD and hypertension. FINDINGS: Overlying ECG monitor leads and gown snaps. Stable right IJ chemo port with unchanged tip position. Coils splenic artery. Enlarged but unchanged appearing cardiac silhouette with a somewhat left ventricular configuration. Mediastinal structures appropriate for slight rotation to the left. Elevated right hemidiaphragm with perhaps medial right basilar atelectasis; additional bibasilar atelectatic appearing changes, greater left lateral base. Minimal infiltrate at the left base possible. .  Mild unchanged blunting left lateral costophrenic sulcus. No Kerley lines. Bones unchanged. Left basilar patchy opacities, probably atelectatic with a small pleural effusion. Developing infiltrate at the left base should be considered. Elevated right hemidiaphragm with probable right medial basilar atelectasis. Cardiomegaly. Otherwise stable findings. RECOMMENDATION: Short-term follow-up chest x-ray. XR CHEST PORTABLE    Result Date: 6/19/2021  EXAMINATION: ONE XRAY VIEW OF THE CHEST 6/19/2021 6:49 am COMPARISON: Chest June 18, 2021. HISTORY: ORDERING SYSTEM PROVIDED HISTORY: intubated TECHNOLOGIST PROVIDED HISTORY: intubated FINDINGS: Right-sided port is in place. ET enteric tubes are in satisfactory positions. Presumed coil embolization is seen involving the left upper quadrant.   Heart and mediastinal structures appear unchanged. Left lower lobe atelectasis with small left pleural effusion is seen and appears unchanged. The right lung appears relatively clear. No pneumothorax or free air. No significant change in chest findings. XR CHEST PORTABLE    Result Date: 6/18/2021  EXAMINATION: ONE XRAY VIEW OF THE CHEST 6/18/2021 10:22 am COMPARISON: AP chest from 03/09/2021; CT chest from 03/09/2021 HISTORY: ORDERING SYSTEM PROVIDED HISTORY: post intubation TECHNOLOGIST PROVIDED HISTORY: post intubation History of ovarian cancer and osseous metastases with previous splenic embolization. FINDINGS: ETT tip position satisfactory approximately 4 cm above the alejandro. Enteric tube tip and side hole project below left hemidiaphragm. Right IJ port in unchanged position, with good tip position in the upper right atrium. Coils splenic artery LUQ. Enlarged but stable appearing cardiac silhouette. Mediastinal structures appropriate for slight rotation to the left. Low lung volumes; mildly increased interstitial markings with scattered parenchymal densities and unchanged increased hilar shadows, L>R. Minimal unchanged blunting left costophrenic angle. No new pulmonary or right pleural abnormality. Scattered bony sclerotic foci, compatible with known Mets. No destructive lesion or obvious interval change. Support tubes satisfactory. Similar enlarged susan (known adenopathy), scattered parenchymal densities (known pulmonary metastases) without significant interval change; probable smaller left pleural effusion. CTA HEAD NECK W CONTRAST    Result Date: 6/18/2021  EXAMINATION: CTA OF THE HEAD AND NECK WITH CONTRAST 6/18/2021 9:19 am: TECHNIQUE: CTA of the head and neck was performed with the administration of intravenous contrast. Multiplanar reformatted images are provided for review. MIP images are provided for review. Stenosis of the internal carotid arteries measured using NASCET criteria. Dose modulation, iterative reconstruction, and/or weight based adjustment of the mA/kV was utilized to reduce the radiation dose to as low as reasonably achievable. COMPARISON: CT brain earlier same day HISTORY: ORDERING SYSTEM PROVIDED HISTORY: stroke TECHNOLOGIST PROVIDED HISTORY: stroke Decision Support Exception - unselect if not a suspected or confirmed emergency medical condition->Emergency Medical Condition (MA) FINDINGS: CTA NECK: AORTIC ARCH/ARCH VESSELS: No dissection or arterial injury. No significant stenosis of the brachiocephalic or subclavian arteries. CAROTID ARTERIES: No dissection, arterial injury, or hemodynamically significant stenosis by NASCET criteria. VERTEBRAL ARTERIES: No dissection, arterial injury, or significant stenosis. SOFT TISSUES: There are multiple foci of soft tissue gas within the lower left neck, likely related to prior central venous line attempt. A right chest Port-A-Cath is present. The lung apices are clear. There is no pathologically enlarged lymphadenopathy or soft tissue mass identified. BONES: No lytic or blastic osseous lesions are identified. There is a posterior disc osteophyte complex at C5-6 resulting in mild spinal canal stenosis and mild-to-moderate bilateral neural foraminal narrowing. 3D surface reformations were performed and concur with the above findings. CTA HEAD: ANTERIOR CIRCULATION: The intracranial segments of the internal carotid arteries are normal.  There is no significant stenosis or aneurysm identified. The anterior and middle cerebral arteries are normal in appearance. No significant stenosis or aneurysm is identified. POSTERIOR CIRCULATION: The distal vertebral arteries are normal in appearance. The basilar artery is normal.  No significant stenosis or aneurysm is identified. The posterior cerebral arteries are normal in appearance. OTHER: No dural venous sinus thrombosis on this non-dedicated study.  BRAIN: There is no mass effect or midline shift. There is no vascular malformation identified. 3D surface reformations were performed and concur with the above findings. 1. No large vessel occlusion, significant stenosis or cerebral aneurysm identified. 2. No significant arterial stenosis identified within the neck. 3. Several foci of gas lucency within the lower left neck. Correlation with any history of attempted left central venous catheter placement is recommended. MRI LIMITED BRAIN    Result Date: 6/18/2021  EXAMINATION: MRI OF THE BRAIN WITHOUT CONTRAST  6/18/2021 11:03 am TECHNIQUE: Multiplanar multisequence MRI of the brain was performed without the administration of intravenous contrast. COMPARISON: October 23, 2020 HISTORY: ORDERING SYSTEM PROVIDED HISTORY: acute right sided weakness, seizures, prior hx of PRES TECHNOLOGIST PROVIDED HISTORY: acute right sided weakness, seizures, prior hx of PRES Reason for Exam: cva, rt sided weakness FINDINGS: INTRACRANIAL STRUCTURES/VENTRICLES: There is no restricted diffusion to suggest acute infarct. No mass effect or midline shift. No evidence of an acute intracranial hemorrhage. The ventricles and sulci are normal in size and configuration. Scattered white matter changes in the subcortical white matter periventricular white matter present. There is no evidence for blood products on gradient imaging. The sellar/suprasellar regions appear unremarkable. The normal signal voids within the major intracranial vessels appear maintained. ORBITS: The visualized portion of the orbits demonstrate no acute abnormality. SINUSES: The visualized paranasal sinuses and mastoid air cells are well aerated. BONES/SOFT TISSUES: The bone marrow signal intensity appears normal. The soft tissues demonstrate no acute abnormality. Mild chronic white matter changes mild volume loss.  No evidence for restricted diffusion to suggest acute infarct       Impression:      57-year-old female with status epilepticus known history of ovarian cancer with mental status is recently diagnosed with press syndrome, severe anemia transfuse 1 unit of RBC,    -Breakthrough seizure  -History of seizure secondary to PRES  -History of ovarian carcinoma  -Encephalopathy: Improved    Plan:     -LTM anything on 6/19: Diffuse theta slowing and previous of diffuse attenuation suggesting mild to moderate encephalopathy,   -CTA head and neck: No LVO,  -CT head: No acute intracranial normality  -MRI brain W0: Mild chronic white matter changes with volume loss, no evidence of restricted diffusion to suggest acute infarct  -AEDs received 2 g in the ED,   -Patient will continue 1.5 g of Keppra twice daily,  -We will add Lamictal and titrated to 60 mg twice daily in addition to Keppra  -Lamictal titration: Week 1 and 2: 25 mg/day; Weeks 3 and 4: 50 mg/day; Week 5: 50 mg BID,   -We will sign off the case please note to staff to contact regarding any further questions,  -Patient will follow-up outpatient in 1 month  -No driving for 6 months seizure-free, no heavy missionary operating, swimming   -Lovenox 40 mg as a DVT prophylaxis dose  -PT/OT      Electronically signed by Felicia Yan MD on 6/24/2021 at MD [facility] [place]  22.  Neurology

## 2021-06-24 NOTE — PROGRESS NOTES
- symmetrical palate                                                                  XI - symmetrical shoulder shrug                                                       XII - midline tongue without atrophy or fasciculation     Motor function   no focal weakness   Sensory function  grossly intact both modalities   Cerebellar  no visible tremors   Reflex function Intact 2+ DTR and symmetric. 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 She is currently on Keppra 1500 mg twice daily and lamotrigine 25 mg a day with a plan to continue titrating up lamotrigine to a target dose of 100 mg twice daily. Patient to have CT abdomen as per hematology oncology. The patient reports slight dizziness which is gradually improving. Possible orthostatic hypotension. Outpatient follow-up with neurology next 4 to 6 weeks  We will follow while she is here          This note is created with the assistance of a speech-recognition program. While intending to generate a document that actually reflects the content of the visit, the document can still have some errors including those of syntax and sound a- like substitutions which may escape proofreading. In such instances, actual meaning can be extrapolated by contextual derivation.

## 2021-06-24 NOTE — PROGRESS NOTES
Comprehensive Nutrition Assessment    Type and Reason for Visit:  Reassess    Nutrition Recommendations/Plan:   1. Continue current Regular Diet  2. Continue High Calorie ONS TID  3. Will continue to monitor nutritional status/ plan of care    Nutrition Assessment:  Pt reports no problems with intake and appetite improving - consumed 100% of breakfast this morning. Pt drinkning Ensure when chocolate - will put pt preference in. Meds: ferrous sulfate. Labs: Glu 104 mg/dL, Ca 8.3 mg/dL. Last BM 6/21. Malnutrition Assessment:  Malnutrition Status: At risk for malnutrition (Comment)    Context:  Acute Illness     Findings of the 6 clinical characteristics of malnutrition:  Energy Intake:  Mild decrease in energy intake (Comment)  Weight Loss:  No significant weight loss     Body Fat Loss:  No significant body fat loss     Muscle Mass Loss:  No significant muscle mass loss    Fluid Accumulation:  No significant fluid accumulation     Strength:  Not Performed    Estimated Daily Nutrient Needs:  Energy (kcal):  5272-3856 kcal/d (23-25 kcal/kg); Weight Used for Energy Requirements:  Current (73.9 kg)     Protein (g):  65-75 g protein/d (1.3-1.5 g/kg); Weight Used for Protein Requirements:  Ideal (73.9 kg)       Fluid (ml/day):  0681-8235 mL fluid/d or per MD; Method Used for Fluid Requirements:  ml/Kg (25-30 mL)      Current Nutrition Therapies:    ADULT DIET; Regular  Adult Oral Nutrition Supplement; Standard High Calorie/High Protein Oral Supplement    Anthropometric Measures:  · Height: 5' 2\" (157.5 cm)  · Current Body Weight: 160 lb 15 oz (73 kg)   · Admission Body Weight: 165 lb 5.5 oz (75 kg)    · Usual Body Weight: 154 lb (69.9 kg) (per pt)     · Ideal Body Weight: 110 lbs; % Ideal Body Weight 147.3 %   · BMI: 29.4  · BMI Categories: Overweight (BMI 25.0-29. 9)       Nutrition Diagnosis:     · Inadequate oral intake (improving) related to  (current medical condition) as evidenced by intake 0-25% Nutrition Interventions:   Food and/or Nutrient Delivery:  Continue Current Diet, Continue Oral Nutrition Supplement  Nutrition Education/Counseling:  No recommendation at this time   Coordination of Nutrition Care:  Continue to monitor while inpatient    Goals:  PO intake to meet >75% of estimated nutrition needs       Nutrition Monitoring and Evaluation:   Behavioral-Environmental Outcomes:  None Identified   Food/Nutrient Intake Outcomes:  Food and Nutrient Intake, Supplement Intake  Physical Signs/Symptoms Outcomes:  Biochemical Data, Nutrition Focused Physical Findings, Weight, Skin     Discharge Planning:     Too soon to determine     Electronically signed by [place] Che[place] MS, RD, LD on 6/24/21 at 1:55 PM EDT    Contact: [phone] [facility] [address] [place] [address] [place]

## 2021-06-24 NOTE — DISCHARGE SUMMARY
Hospital Course:  Jeronimo Mckeon is a 60 y.o. female who was admitted for the management of   Status epilepticus ([facility] [address] , presented to ER with altered mental status. Per my partner:  \"60year-old female past medical history of recurrent ovarian cancer originally diagnosed in 2005, thrombocytosis, depression, prior history of DVT in March 2021, iron deficiency anemia presented on 6/18/2021 due to altered mental status.  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 Orie Antis was intubated due to concern of status epilepticus and required airway protection on 6/18/2021. Orie Antis was extubated on 6/19/2021. Christopher Aydlett dose was increased to 1500 mg twice a day. Orie Antis has been followed by hematology oncology as well and also has been transfused 1 unit of PRBCs on 6/20/2021. \"    A/ Plan  1. Status epilepticus- resolved. On Keppra 1500 mg BID and Lamictal per Neurology. Seizure precautions. MRI brain and CTA head/ neck negative for acute process. 2. Ovarian cancer- chemotherapy on hold per Hem/Onc, pain meds controlling pain. Palliative care now following pt. CT abd/ pelvis ordered  3. PRES  4. Anemia- iron deficiency- s/p 1 unit PRBCs, on iron supplement, Hb stable  5. Thrombocytosis- on ASA, likely reactive  6. Chronic DVT left LE- on Eliquis  7. DM2- last HbA1C: 6 in Jan 2021, on dietary supplements   8. Lightheadedness-  Tavo hose, Meclizine prn, slowly improving  9. Left lung atelectasis- incentive spirometer q2 hrs while awake, no fevers or cough. Okay to stop Augmentin, pt getting diarrhea from it  10. Elevated troponins- may be from seizures? Echo- normal LV function, EF 55%  11. Gi proph- Pepcid BID  12. PT/OT    Pt is agreeable to Home care. Son is very involved with her care. She will have help from her daughters too. Fu with Palliative care outpatient and Oncology.       Significant therapeutic interventions: see above    Significant Diagnostic Studies:       Radiology:  CT Head WO Contrast    Result Date: 6/18/2021  No acute intracranial process identified. The findings were sent to the Radiology Results [address] at 9:41 am on 6/18/2021to be communicated to the Stroke Neurology service. CT ABDOMEN PELVIS W IV CONTRAST Additional Contrast? Oral    Result Date: 6/23/2021  Apart from removal of the left upper quadrant drainage catheter. Centrally stable degree of metastatic disease in the abdomen and pelvis and visualized lung bases. XR CHEST PORTABLE    Result Date: 6/22/2021  Stable chest with question of mild left pleural effusion and/or some partial atelectasis within the left lower lung. XR CHEST PORTABLE    Result Date: 6/21/2021  Left basilar patchy opacities, probably atelectatic with a small pleural effusion. Developing infiltrate at the left base should be considered. Elevated right hemidiaphragm with probable right medial basilar atelectasis. Cardiomegaly. Otherwise stable findings. RECOMMENDATION: Short-term follow-up chest x-ray. XR CHEST PORTABLE    Result Date: 6/19/2021  No significant change in chest findings. XR CHEST PORTABLE    Result Date: 6/18/2021  Support tubes satisfactory. Similar enlarged susan (known adenopathy), scattered parenchymal densities (known pulmonary metastases) without significant interval change; probable smaller left pleural effusion. CTA HEAD NECK W CONTRAST    Result Date: 6/18/2021  1. No large vessel occlusion, significant stenosis or cerebral aneurysm identified. 2. No significant arterial stenosis identified within the neck. 3. Several foci of gas lucency within the lower left neck. Correlation with any history of attempted left central venous catheter placement is recommended. MRI LIMITED BRAIN    Result Date: 6/18/2021  Mild chronic white matter changes mild volume loss.  No evidence for restricted diffusion to suggest acute infarct       Consultations:    Consults: Final Specialist Recommendations/Findings:   IP CONSULT TO NEUROCRITICAL CARE  IP CONSULT TO DIETITIAN  IP CONSULT TO HEM/ONC  IP CONSULT TO NEUROLOGY  IP CONSULT TO PALLIATIVE CARE      The patient was seen and examined on day of discharge and this discharge summary is in conjunction with any daily progress note from day of discharge. On Day of Discharge 6/24/2021    ROS:  Constitutional:  negative for chills, fevers, sweats  Respiratory:  negative for cough, dyspnea on exertion, shortness of breath, wheezing  Cardiovascular:  negative for chest pain, chest pressure/discomfort, lower extremity edema, palpitations  Gastrointestinal:  negative for abdominal pain, constipation, diarrhea, nausea, vomiting  Neurological:  Positive for dizziness, no headache    PE:    General appearance:  alert, cooperative and no distress  Mental Status:  oriented to person, place and time and normal affect  Lungs:  clear to auscultation bilaterally, normal effort  Heart:  regular rate and rhythm, no murmur  Abdomen:  soft, nontender, nondistended, normal bowel sounds, no masses, hepatomegaly, splenomegaly  Extremities:  no edema, redness, tenderness in the calves  Skin:  Left Facial eschar, healing    Discharge plan:     Disposition: Home    Physician Follow Up:      Arian Lilly MD  [address] [place] [address]  MOB # [address] [facility]  [phone]    Schedule an appointment as soon as possible for a visit in 1 month      Carly [address] [address]  [phone]    Schedule an appointment as soon as possible for a visit in 1 week         Diet: regular diet    Activity: As tolerated    Instructions to Patient: Follow up with Palliative care and Hem/onc outpatient    Discharge Medications:      Medication List      START taking these medications    amoxicillin-clavulanate 875-125 MG per tablet  Commonly known as: AUGMENTIN  Take 1 tablet by mouth every 12 hours for 4 days     lamoTRIgine 25 MG tablet  Commonly known as: LAMICTAL  Take 1 tablet by mouth daily     meclizine 12.5 MG tablet  Commonly known as: ANTIVERT  Take 1 tablet by mouth 3 times daily as needed for Dizziness     ondansetron 4 MG disintegrating tablet  Commonly known as: ZOFRAN-ODT  Take 1 tablet by mouth every 8 hours as needed for Nausea or Vomiting        CHANGE how you take these medications    oxyCODONE-acetaminophen 5-325 MG per tablet  Commonly known as: PERCOCET  Take 1 tablet by mouth every 6 hours as needed for Pain (breakthrough pain) for up to 5 days. What changed: when to take this        CONTINUE taking these medications    aspirin 81 MG chewable tablet     Eliquis 5 MG Tabs tablet  Generic drug: apixaban     ferrous sulfate 325 (65 Fe) MG EC tablet  Commonly known as: FE TABS 325  Take 1 tablet by mouth daily (with breakfast)     levETIRAcetam 750 MG tablet  Commonly known as: KEPPRA  Take 2 tablets by mouth 2 times daily     morphine 15 MG extended release tablet  Commonly known as: MS CONTIN  Take 1 tablet by mouth 2 times daily for 30 days.      pantoprazole 40 MG tablet  Commonly known as: Protonix  Take 1 tablet by mouth 2 times daily     sertraline 100 MG tablet  Commonly known as: ZOLOFT  Take 1 tablet by mouth daily        STOP taking these medications    metFORMIN 500 MG extended release tablet  Commonly known as: GLUCOPHAGE-XR     metoprolol succinate 25 MG extended release tablet  Commonly known as: TOPROL XL           Where to Get Your Medications      These medications were sent to [facility] [address] [place] [address], [address] [place] [id number]    Phone: [phone]   · amoxicillin-clavulanate 875-125 MG per tablet  · lamoTRIgine 25 MG tablet  · meclizine 12.5 MG tablet  · ondansetron 4 MG disintegrating tablet     You can get these medications from any pharmacy    Bring a paper prescription for each of these medications  · oxyCODONE-acetaminophen 5-325 MG per tablet         No discharge procedures on file. Time Spent on discharge is  32 mins in patient examination, evaluation, counseling as well as medication reconciliation, prescriptions for required medications, discharge plan and follow up. Electronically signed by   Natali Monge MD  6/24/2021  2:42 PM      Thank you Dr. Jossy Rai MD for the opportunity to be involved in this patient's care.

## 2021-06-25 ENCOUNTER — HOSPITAL ENCOUNTER (OUTPATIENT)
Dept: INFUSION THERAPY | Age: 56
End: 2021-06-25
Payer: COMMERCIAL

## 2021-06-29 DIAGNOSIS — M54.59 INTRACTABLE LOW BACK PAIN: ICD-10-CM

## 2021-06-29 DIAGNOSIS — C79.51 CANCER, METASTATIC TO BONE (HCC): ICD-10-CM

## 2021-06-29 RX ORDER — MORPHINE SULFATE 30 MG/1
30 TABLET, FILM COATED, EXTENDED RELEASE ORAL 2 TIMES DAILY
Qty: 60 TABLET | Refills: 0 | Status: SHIPPED | OUTPATIENT
Start: 2021-06-29 | End: 2021-08-02

## 2021-06-29 RX ORDER — OXYCODONE HYDROCHLORIDE AND ACETAMINOPHEN 5; 325 MG/1; MG/1
1 TABLET ORAL EVERY 4 HOURS PRN
Qty: 180 TABLET | Refills: 0 | OUTPATIENT
Start: 2021-06-29 | End: 2021-07-29

## 2021-06-29 RX ORDER — MORPHINE SULFATE 30 MG/1
30 TABLET, FILM COATED, EXTENDED RELEASE ORAL 2 TIMES DAILY
Qty: 60 TABLET | Refills: 0 | OUTPATIENT
Start: 2021-06-29 | End: 2021-07-29

## 2021-06-29 RX ORDER — OXYCODONE HYDROCHLORIDE AND ACETAMINOPHEN 5; 325 MG/1; MG/1
1 TABLET ORAL EVERY 4 HOURS PRN
Qty: 180 TABLET | Refills: 0 | Status: SHIPPED | OUTPATIENT
Start: 2021-06-29 | End: 2021-07-26

## 2021-07-01 NOTE — TELEPHONE ENCOUNTER
PA complete for morphine and percocet. Notified pharmacy and they state pt picked this up yesterday.

## 2021-07-06 DIAGNOSIS — C79.51 CANCER, METASTATIC TO BONE (HCC): ICD-10-CM

## 2021-07-07 RX ORDER — MORPHINE SULFATE 15 MG/1
15 TABLET, FILM COATED, EXTENDED RELEASE ORAL 2 TIMES DAILY
Qty: 60 TABLET | Refills: 0 | Status: SHIPPED | OUTPATIENT
Start: 2021-07-07 | End: 2021-08-06

## 2021-07-09 ENCOUNTER — TELEPHONE (OUTPATIENT)
Dept: ONCOLOGY | Age: 56
End: 2021-07-09

## 2021-07-09 ENCOUNTER — OFFICE VISIT (OUTPATIENT)
Dept: ONCOLOGY | Age: 56
End: 2021-07-09
Payer: COMMERCIAL

## 2021-07-09 ENCOUNTER — HOSPITAL ENCOUNTER (OUTPATIENT)
Age: 56
Discharge: HOME | End: 2021-07-09
Payer: COMMERCIAL

## 2021-07-09 VITALS
SYSTOLIC BLOOD PRESSURE: 114 MMHG | TEMPERATURE: 98.6 F | RESPIRATION RATE: 18 BRPM | HEART RATE: 81 BPM | DIASTOLIC BLOOD PRESSURE: 71 MMHG | BODY MASS INDEX: 29.48 KG/M2 | WEIGHT: 161.2 LBS

## 2021-07-09 DIAGNOSIS — C56.9 MALIGNANT NEOPLASM OF OVARY, UNSPECIFIED LATERALITY (HCC): Primary | ICD-10-CM

## 2021-07-09 DIAGNOSIS — C56.9 MALIGNANT NEOPLASM OF OVARY, UNSPECIFIED LATERALITY (HCC): ICD-10-CM

## 2021-07-09 DIAGNOSIS — M54.59 INTRACTABLE LOW BACK PAIN: ICD-10-CM

## 2021-07-09 DIAGNOSIS — C79.51 CANCER, METASTATIC TO BONE (HCC): ICD-10-CM

## 2021-07-09 LAB
ABSOLUTE EOS #: 0.74 K/UL (ref 0–0.4)
ABSOLUTE IMMATURE GRANULOCYTE: ABNORMAL K/UL (ref 0–0.3)
ABSOLUTE LYMPH #: 1.67 K/UL (ref 1–4.8)
ABSOLUTE MONO #: 0.65 K/UL (ref 0.1–1.2)
ALBUMIN SERPL-MCNC: 4 G/DL (ref 3.5–5.2)
ALBUMIN/GLOBULIN RATIO: 1.4 (ref 1–2.5)
ALP BLD-CCNC: 73 U/L (ref 35–104)
ALT SERPL-CCNC: 9 U/L (ref 5–33)
ANION GAP SERPL CALCULATED.3IONS-SCNC: 10 MMOL/L (ref 9–17)
AST SERPL-CCNC: 13 U/L
BASOPHILS # BLD: 2 % (ref 0–2)
BASOPHILS ABSOLUTE: 0.19 K/UL (ref 0–0.2)
BILIRUB SERPL-MCNC: 0.16 MG/DL (ref 0.3–1.2)
BUN BLDV-MCNC: 8 MG/DL (ref 6–20)
BUN/CREAT BLD: ABNORMAL (ref 9–20)
CA 125: 388 U/ML
CALCIUM SERPL-MCNC: 9.2 MG/DL (ref 8.6–10.4)
CHLORIDE BLD-SCNC: 101 MMOL/L (ref 98–107)
CO2: 24 MMOL/L (ref 20–31)
CREAT SERPL-MCNC: 0.4 MG/DL (ref 0.5–0.9)
DIFFERENTIAL TYPE: ABNORMAL
EOSINOPHILS RELATIVE PERCENT: 8 % (ref 1–4)
GFR AFRICAN AMERICAN: >60 ML/MIN
GFR NON-AFRICAN AMERICAN: >60 ML/MIN
GFR SERPL CREATININE-BSD FRML MDRD: ABNORMAL ML/MIN/{1.73_M2}
GFR SERPL CREATININE-BSD FRML MDRD: ABNORMAL ML/MIN/{1.73_M2}
GLUCOSE BLD-MCNC: 97 MG/DL (ref 70–99)
HCT VFR BLD CALC: 36 % (ref 36–46)
HEMOGLOBIN: 11.2 G/DL (ref 12–16)
IMMATURE GRANULOCYTES: ABNORMAL %
LYMPHOCYTES # BLD: 18 % (ref 24–44)
MCH RBC QN AUTO: 25 PG (ref 26–34)
MCHC RBC AUTO-ENTMCNC: 31 G/DL (ref 31–37)
MCV RBC AUTO: 80.7 FL (ref 80–100)
MONOCYTES # BLD: 7 % (ref 2–11)
MORPHOLOGY: ABNORMAL
NRBC AUTOMATED: ABNORMAL PER 100 WBC
PDW BLD-RTO: 29.2 % (ref 12.5–15.4)
PLATELET # BLD: 469 K/UL (ref 140–450)
PLATELET ESTIMATE: ABNORMAL
PMV BLD AUTO: 6.9 FL (ref 6–12)
POTASSIUM SERPL-SCNC: 4.1 MMOL/L (ref 3.7–5.3)
RBC # BLD: 4.46 M/UL (ref 4–5.2)
RBC # BLD: ABNORMAL 10*6/UL
SEG NEUTROPHILS: 65 % (ref 36–66)
SEGMENTED NEUTROPHILS ABSOLUTE COUNT: 6.05 K/UL (ref 1.8–7.7)
SODIUM BLD-SCNC: 135 MMOL/L (ref 135–144)
TOTAL PROTEIN: 6.9 G/DL (ref 6.4–8.3)
WBC # BLD: 9.3 K/UL (ref 3.5–11)
WBC # BLD: ABNORMAL 10*3/UL

## 2021-07-09 PROCEDURE — G8427 DOCREV CUR MEDS BY ELIG CLIN: HCPCS | Performed by: INTERNAL MEDICINE

## 2021-07-09 PROCEDURE — 1111F DSCHRG MED/CURRENT MED MERGE: CPT | Performed by: INTERNAL MEDICINE

## 2021-07-09 PROCEDURE — 85025 COMPLETE CBC W/AUTO DIFF WBC: CPT

## 2021-07-09 PROCEDURE — 3017F COLORECTAL CA SCREEN DOC REV: CPT | Performed by: INTERNAL MEDICINE

## 2021-07-09 PROCEDURE — 80053 COMPREHEN METABOLIC PANEL: CPT

## 2021-07-09 PROCEDURE — 4004F PT TOBACCO SCREEN RCVD TLK: CPT | Performed by: INTERNAL MEDICINE

## 2021-07-09 PROCEDURE — G8417 CALC BMI ABV UP PARAM F/U: HCPCS | Performed by: INTERNAL MEDICINE

## 2021-07-09 PROCEDURE — 86304 IMMUNOASSAY TUMOR CA 125: CPT

## 2021-07-09 PROCEDURE — 99211 OFF/OP EST MAY X REQ PHY/QHP: CPT | Performed by: INTERNAL MEDICINE

## 2021-07-09 PROCEDURE — 99214 OFFICE O/P EST MOD 30 MIN: CPT | Performed by: INTERNAL MEDICINE

## 2021-07-09 RX ORDER — LAMOTRIGINE 25 MG/1
25 TABLET ORAL DAILY
Qty: 30 TABLET | Refills: 3 | Status: SHIPPED | OUTPATIENT
Start: 2021-07-09 | End: 2021-07-12

## 2021-07-09 NOTE — TELEPHONE ENCOUNTER
AVS from 7/9/21     Chemo next Friday and as per schedule.   RV 3-4 weeks       *chemo sched for [email or web address]  *rv is sched for [email or web address]    PT was given AVS and an appt schedule

## 2021-07-10 NOTE — PROGRESS NOTES
_      Chief Complaint   Patient presents with    Follow-up     hiospitalized     Seizures    Back Pain     DIAGNOSIS:       Recurrent ovarian cancer. Original diagnosis 2005 with multiple recurrences. Diffuse metastasis. CURRENT THERAPY:         Doxil/ Avastin started 9/18/2020. Avastin was discontinued due to recent GI bleeding. Status post recent vascular embolization  S/p seizure disorder. Gemzar started 2/26/2021. Interrupted due to recent hospitalization    BRIEF CASE HISTORY:      Ms. Erik Pena is a very pleasant 60 y.o. female with history of multiple co morbidities as listed. The patient seen in consultation for ovarian cancer with multiple recurrences. 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 She had surgical debulking and she had systemic treatment with Taxol and carboplatin for 6 cycles. Patient was in remission for about 2 years. She had a relapse in 2007 and treated with topotecan with good results. Patient relapsed again in 2008 and was treated with Taxol carboplatin. After 3 cycles of systemic chemotherapy she had problems with carboplatin so she finished 3 more cycles with Taxol. She did well again for 2 to 3 years until she had a relapse in 2012 and she had intraperitoneal chemotherapy treatment with Taxol. Patient was last seen by her oncologist in 2014 at which time she had relatively stable disease with normal tumor marker and no significant abnormal images. Patient moved to [place] after that and she was lost for oncology follow-ups. Patient was recently evaluated again here in [facility] because of abdominal discomfort. Re imaging confirmed metastatic relapse. Biopsy confirmed ovarian cancer recurrence. Scan showed extensive intraabdominal and splenic involvement and lung mets. She has no symptoms at the present time. Patient denies smoking or alcohol drinking.l    INTERIM HISTORY:    patient is seen for follow up recurrent ovarian cancer. Patient received 1 cycle of palliative chemotherapy using Doxil/ Avastin. Patient was hospitalized at [facility] last week because of severe GI bleeding. She had multiple blood transfusions. She had GI and vascular evaluation. She had evidence of intra-abdominal mass at the splenic area with GI infiltration. Patient had embolization by vascular. Recently readmitted because of a seizure disorder. Images showed no brain mets. Currently seizures are controlled. Patient was hospitalized due to left leg DVT and anemia. She was treated accordingly. She received Eliquis. Tolerated well. Admitted last week with seizures. Treatment adjusted. No more seizures activity. Started on Gemzar. Tolerated well. No melena or hematochezia. No hematemesis. PAST MEDICAL HISTORY: has a past medical history of Anemia, Bleeding, Cervical cancer ([facility] [address] Depression, Diabetes mellitus ([facility] [address] GERD (gastroesophageal reflux disease), Hx of blood clots, Hypertension, Metastatic cancer ([facility] [address] Ovarian cancer ([facility] [address] Post chemo evaluation, and Splenic lesion. PAST SURGICAL HISTORY: has a past surgical history that includes Hysterectomy, total abdominal; Port Surgery; Tonsillectomy; IR PORT PLACEMENT > 5 YEARS (08/24/2020); Anus surgery; Abscess Drainage (2013); colectomy (03/2013); IR EMBOLIZATION HEMORRHAGE (10/05/2020); and Cardiac catheterization. CURRENT MEDICATIONS:  has a current medication list which includes the following prescription(s): lamotrigine, morphine, oxycodone-acetaminophen, morphine, ondansetron, sertraline, eliquis, levetiracetam, aspirin, pantoprazole, and ferrous sulfate. ALLERGIES:  is allergic to ceftriaxone. FAMILY HISTORY: Negative for any hematological or oncological conditions. SOCIAL HISTORY:  reports that she has been smoking cigarettes.  She has been smoking about 1.00 pack per day. She uses smokeless tobacco. She reports previous alcohol use. She reports that she does not use drugs. REVIEW OF SYSTEMS:     · General: No weakness or fatigue. No unanticipated weight loss or decreased appetite. No fever or chills. · Eyes: No blurred vision, eye pain or double vision. · Ears: No hearing problems or drainage. No tinnitus. · Throat: No sore throat, problems with swallowing or dysphagia. · Respiratory: No cough, sputum or hemoptysis. No shortness of breath. No pleuritic chest pain. · Cardiovascular: No chest pain, orthopnea or PND. No lower extremity edema. No palpitation. · Gastrointestinal: As above. · Genitourinary: No dysuria, hematuria, frequency or urgency. · Musculoskeletal: No muscle aches or pains. No limitation of movement. No back pain. No gait disturbance, No joint complaints. · Dermatologic: No skin rashes or pruritus. No skin lesions or discolorations. · Psychiatric: No depression, anxiety, or stress or signs of schizophrenia. No change in mood or affect. · Hematologic: No history of bleeding tendency. No bruises or ecchymosis. No history of clotting problems. · Infectious disease: No fever, chills or frequent infections. · Endocrine: No polydipsia or polyuria. No temperature intolerance. · Neurologic: No headaches or dizziness. No weakness or numbness of the extremities. No changes in balance, coordination,  memory, mentation, behavior. · Allergic/Immunologic: No nasal congestion or hives. No repeated infections. PHYSICAL EXAM:  The patient is not in acute distress. Vital signs: Blood pressure 114/71, pulse 81, temperature 98.6 °F (37 °C), temperature source Oral, resp. rate 18, weight 161 lb 3.2 oz (73.1 kg).      General appearance - well appearing, not in pain or distress  Mental status - good mood, alert and oriented  Eyes - pupils equal and reactive, extraocular eye movements intact  Ears - bilateral TM's and external ear canals normal  Nose - normal and patent, no erythema, discharge or polyps  Mouth - mucous membranes moist, pharynx normal without lesions  Neck - supple, no significant adenopathy  Lymphatics - no palpable lymphadenopathy, no hepatosplenomegaly  Chest - clear to auscultation, no wheezes, rales or rhonchi, symmetric air entry  Heart - normal rate, regular rhythm, normal S1, S2, no murmurs, rubs, clicks or gallops  Abdomen - soft, nontender, nondistended, no masses or organomegaly  Neurological - alert, oriented, normal speech, no focal findings or movement disorder noted  Musculoskeletal - no joint tenderness, deformity or swelling  Extremities - peripheral pulses normal, no pedal edema, no clubbing or cyanosis  Skin - normal coloration and turgor, no rashes, no suspicious skin lesions noted     Review of Diagnostic data:   Lab Results   Component Value Date    WBC 9.3 07/09/2021    HGB 11.2 (L) 07/09/2021    HCT 36.0 07/09/2021    MCV 80.7 07/09/2021     (H) 07/09/2021       Chemistry        Component Value Date/Time     07/09/2021 1049    K 4.1 07/09/2021 1049     07/09/2021 1049    CO2 24 07/09/2021 1049    BUN 8 07/09/2021 1049    CREATININE 0.40 (L) 07/09/2021 1049        Component Value Date/Time    CALCIUM 9.2 07/09/2021 1049    ALKPHOS 73 07/09/2021 1049    AST 13 07/09/2021 1049    ALT 9 07/09/2021 1049    BILITOT 0.16 (L) 07/09/2021 1049          Lab Results   Component Value Date     388 (H) 07/09/2021         IMPRESSION:   Recurrent ovarian cancer. Original diagnosis 2005 with multiple recurrences. Diffuse metastasis. Recent active intra-abdominal bleeding due to splenic mass with GI infiltration. Status post embolization  S/p seizure disorder. PLAN:   Discussed palliative treatment. Different regimens were discussed. We prescribed Doxil as single agent but insurance declined. Patient was started on Doxil and Avastin. She tolerated treatment fairly well.

## 2021-07-12 ENCOUNTER — TELEPHONE (OUTPATIENT)
Dept: ONCOLOGY | Age: 56
End: 2021-07-12

## 2021-07-12 ENCOUNTER — OFFICE VISIT (OUTPATIENT)
Dept: NEUROLOGY | Age: 56
End: 2021-07-12
Payer: COMMERCIAL

## 2021-07-12 VITALS
DIASTOLIC BLOOD PRESSURE: 67 MMHG | WEIGHT: 161 LBS | OXYGEN SATURATION: 99 % | HEIGHT: 63 IN | BODY MASS INDEX: 28.53 KG/M2 | SYSTOLIC BLOOD PRESSURE: 107 MMHG | HEART RATE: 90 BPM

## 2021-07-12 DIAGNOSIS — R56.9 SEIZURE (HCC): Primary | ICD-10-CM

## 2021-07-12 DIAGNOSIS — F41.9 ANXIETY: ICD-10-CM

## 2021-07-12 PROCEDURE — 1111F DSCHRG MED/CURRENT MED MERGE: CPT | Performed by: STUDENT IN AN ORGANIZED HEALTH CARE EDUCATION/TRAINING PROGRAM

## 2021-07-12 PROCEDURE — 4004F PT TOBACCO SCREEN RCVD TLK: CPT | Performed by: STUDENT IN AN ORGANIZED HEALTH CARE EDUCATION/TRAINING PROGRAM

## 2021-07-12 PROCEDURE — 99213 OFFICE O/P EST LOW 20 MIN: CPT | Performed by: STUDENT IN AN ORGANIZED HEALTH CARE EDUCATION/TRAINING PROGRAM

## 2021-07-12 PROCEDURE — G8428 CUR MEDS NOT DOCUMENT: HCPCS | Performed by: STUDENT IN AN ORGANIZED HEALTH CARE EDUCATION/TRAINING PROGRAM

## 2021-07-12 PROCEDURE — G8417 CALC BMI ABV UP PARAM F/U: HCPCS | Performed by: STUDENT IN AN ORGANIZED HEALTH CARE EDUCATION/TRAINING PROGRAM

## 2021-07-12 PROCEDURE — 3017F COLORECTAL CA SCREEN DOC REV: CPT | Performed by: STUDENT IN AN ORGANIZED HEALTH CARE EDUCATION/TRAINING PROGRAM

## 2021-07-12 RX ORDER — LEVETIRACETAM 750 MG/1
1500 TABLET ORAL 2 TIMES DAILY
Qty: 240 TABLET | Refills: 2 | Status: SHIPPED | OUTPATIENT
Start: 2021-07-12 | End: 2022-02-09 | Stop reason: SDUPTHER

## 2021-07-12 RX ORDER — LAMOTRIGINE 25 MG/1
TABLET ORAL
Qty: 180 TABLET | Refills: 2 | Status: SHIPPED | OUTPATIENT
Start: 2021-07-12 | End: 2021-07-30 | Stop reason: SDUPTHER

## 2021-07-12 ASSESSMENT — ENCOUNTER SYMPTOMS
EYE REDNESS: 0
PHOTOPHOBIA: 0
CHOKING: 0
EYE PAIN: 0
COUGH: 0
ABDOMINAL PAIN: 0
ABDOMINAL DISTENTION: 0
APNEA: 0
CHEST TIGHTNESS: 0
BACK PAIN: 0

## 2021-07-12 NOTE — PROGRESS NOTES
[place] [address] [id number]  Dept: [phone]  Dept Fax: [phone]    NEUROLOGY NEW PATIENT NOTE       PATIENT NAME: Naveen Bhardwaj [facility]  PATIENT MRN: [id number]  PRIMARY CARE PHYSICIAN: Jeffery Lopez MD      HPI:      Padmini Alexandra is a 60 y.o. 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 The patient does have history of seizures and she is on Keppra. Per medical chart, the patient was confused, EMS was called and in route the patient had seizure-like activity. In the ED, the patient had another 2 generalized tonic-clonic seizures that was aborted by 2 mg of Ativan and was loaded with 1 g of Keppra. The patient underwent stat MRI of the brain which was unremarkable. CTA was unremarkable. Keppra was increased to 1500 mg twice daily along with starting lamotrigine 25 mg daily. LTME in [address] in 10/2020. Interval history  The patient denied any further seizures after June admission. The patient is complaining of severe anxiety, will refer her to psychiatrist.  We will continue 1500 mg of Keppra twice daily. The patient is on 25 mg daily of Lamictal, will increase the dose incrementally to 100 mg daily. PREVIOUS WORKUP:     Lab Results   Component Value Date    WBC 9.3 07/09/2021    HGB 11.2 (L) 07/09/2021    HCT 36.0 07/09/2021    MCV 80.7 07/09/2021     (H) 07/09/2021       Past Medical History:   Diagnosis Date    Anemia     Bleeding 10/2020    intra-abdominal bleeding -due to splenic mass with GI infiltration.   Status post embolization    Cervical cancer ([address]     Depression     Diabetes mellitus ([address]     GERD (gastroesophageal reflux disease)     Hx of blood clots     Hypertension     Metastatic cancer ([address] 10/2020    extensive intraabdominal and splenic involvement and lung mets.  Ovarian cancer ([facility] [address]     low grade serous ovarian carcinoma    Post chemo evaluation     2007: Chemo via med onc (Dr. Florencio Mcclure), 2008: Tonye Grade due to rising CA-125, 2013: intraperitoneal chemo,12/2015: Ca125 - 25     Splenic lesion         Past Surgical History:   Procedure Laterality Date    ABSCESS DRAINAGE  2013    Franca rectal    ANUS SURGERY      ANAL FISSURECTOMY    CARDIAC CATHETERIZATION      COLECTOMY  03/2013    ex lap, tumor debulking, transverse colectomy w reanastamosis, subgastric omentectomy, intraperitoneal port placement    HYSTERECTOMY, TOTAL ABDOMINAL      IR EMBOLIZATION HEMORRHAGE  10/05/2020    intra-abdominal bleeding -due to splenic mass with GI infiltration. Status post embolization [employer] interlock coils x7. mri condtional 3t ok, safe immediately post implant.     IR PORT PLACEMENT EQUAL OR GREATER THAN 5 YEARS  08/24/2020    IR PORT PLACEMENT EQUAL OR GREATER THAN 5 YEARS 8/24/2020 Yo Hull MD [facility]    PORT SURGERY      IP Port    TONSILLECTOMY          Social History     Socioeconomic History    Marital status: Single     Spouse name: Not on file    Number of children: Not on file    Years of education: Not on file    Highest education level: Not on file   Occupational History    Not on file   Tobacco Use    Smoking status: Current Every Day Smoker     Packs/day: 1.00     Types: Cigarettes    Smokeless tobacco: Current User   Vaping Use    Vaping Use: Never used   Substance and Sexual Activity    Alcohol use: Not Currently    Drug use: Never    Sexual activity: Not on file   Other Topics Concern    Not on file   Social History Narrative    Not on file     Social Determinants of Health     Financial Resource Strain:     Difficulty of Paying Living Expenses:    Food Insecurity:     Worried About Running Out of Food in the Last Year:     Cierra of Food in the Last Year:    Transportation Needs:     Lack of Transportation (Medical):  Lack of Transportation (Non-Medical):    Physical Activity:     Days of Exercise per Week:     Minutes of Exercise per Session:    Stress:     Feeling of Stress :    Social Connections:     Frequency of Communication with Friends and Family:     Frequency of Social Gatherings with Friends and Family:     Attends [employer] Services:     Active Member of Clubs or Organizations:     Attends Club or Organization Meetings:     Marital Status:    Intimate Partner Violence:     Fear of Current or Ex-Partner:     Emotionally Abused:     Physically Abused:     Sexually Abused:         Current Outpatient Medications   Medication Sig Dispense Refill    lamoTRIgine (LAMICTAL) 25 MG tablet Take 1 tablet by mouth daily 30 tablet 3    morphine (MS CONTIN) 15 MG extended release tablet TAKE 1 TABLET BY MOUTH 2 TIMES DAILY FOR 30 DAYS. 60 tablet 0    oxyCODONE-acetaminophen (PERCOCET) 5-325 MG per tablet Take 1 tablet by mouth every 4 hours as needed for Pain (breakthrough pain) for up to 30 days. 180 tablet 0    morphine (MS CONTIN) 30 MG extended release tablet Take 1 tablet by mouth 2 times daily for 30 days.  60 tablet 0    ondansetron (ZOFRAN-ODT) 4 MG disintegrating tablet Take 1 tablet by mouth every 8 hours as needed for Nausea or Vomiting 21 tablet 0    ELIQUIS 5 MG TABS tablet Take 1 tablet by mouth daily      levETIRAcetam (KEPPRA) 750 MG tablet Take 2 tablets by mouth 2 times daily 240 tablet 2    aspirin 81 MG chewable tablet Take 81 mg by mouth nightly Pt not taking      sertraline (ZOLOFT) 100 MG tablet Take 1 tablet by mouth daily 30 tablet 3    pantoprazole (PROTONIX) 40 MG tablet Take 1 tablet by mouth 2 times daily (Patient not taking: Reported on 7/9/2021) 60 tablet 0    ferrous sulfate (FE TABS 325) 325 (65 Fe) MG EC tablet Take 1 tablet by mouth daily (with breakfast) (Patient not taking: Reported on 7/9/2021) 30 tablet 0     No current facility-administered medications for this visit. Allergies   Allergen Reactions    Ceftriaxone      Had a rash on ceftriaxone December 2020, [facility]        REVIEW OF SYSTEMS:     Review of Systems   Constitutional: Negative for chills, diaphoresis, fatigue and fever. HENT: Negative for congestion, dental problem, drooling and ear discharge. Eyes: Negative for photophobia, pain, redness and visual disturbance. Respiratory: Negative for apnea, cough, choking and chest tightness. Cardiovascular: Negative for chest pain. Gastrointestinal: Negative for abdominal distention and abdominal pain. Endocrine: Negative for polyphagia and polyuria. Genitourinary: Negative for dysuria. Musculoskeletal: Negative for arthralgias and back pain. Neurological: Negative for dizziness, tremors, seizures, syncope, facial asymmetry, speech difficulty, weakness, light-headedness, numbness and headaches. Hematological: Negative for adenopathy. Psychiatric/Behavioral: Positive for agitation. VITALS  /67   Pulse 90   Ht 5' 3\" (1.6 m)   Wt 161 lb (73 kg)   SpO2 99%   BMI 28.52 kg/m²      PHYSICAL EXAMINATION:     Physical Exam   General appearance: cooperative  Skin: no rash or skin lesions.   HEENT: normocephalic  Optic Fundi: deferred  Neck: supple, no cervcical adenopathy or carotid bruit  Lungs: clear to auscultation  Heart: Regular rate and rhythm,   Peripheral pulses: radial pulses palpable  Abdominal: BS present, soft, NT, ND  Extremities: no edema    NEUROLOGICAL EXAMINATION:     GENERAL  Appears comfortable and in no distress   HEENT  NC/ AT   HEART  S1 and S2 heard; palpation of pulses: radial pulse    NECK  Supple and no bruits heard   MENTAL STATUS:  Alert, oriented, intact memory, no confusion, normal speech, normal language, no hallucination or delusion   CRANIAL NERVES: II     -      Visual fields intact to confrontation  III,IV,VI -  PERR, EOMs full, no ptosis  V -     Normal facial sensation   VII    -     Normal facial symmetry  VIII   -     Intact hearing   IX,X -     Symmetrical palate  XI    -     Symmetrical shoulder shrug  XII   -     Midline tongue, no atrophy    MOTOR FUNCTION: RUE: Significant for good strength of grade 5/5 in proximal and distal muscle groups   LUE: Significant for good strength of grade 5/5 in proximal and distal muscle groups   RLE: Significant for good strength of grade 5/5 in proximal and distal muscle groups   LLE: Significant for good strength of grade 5/5 in proximal and distal muscle groups      Normal bulk, normal tone and no involuntary movements, no tremor   SENSORY FUNCTION:  Normal touch, normal pin, normal vibration, normal proprioception   CEREBELLAR FUNCTION:  Intact fine motor control over upper limbs and lower limbs   REFLEX FUNCTION:  Symmetric in upper and lower extremities, no Babinski sign   STATION and GAIT  Normal gait and tandem station, normal tip toes and heel walking         ASSESSMENT:       Juli Nolen is a 60 y.o. 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  -Seizure precautions discussed with the patient, No driving for at least 6 months. No heavy lifting for at least 6 months  No bathing or swimming unsupervised  Avoid locking doors, it prevents some to help you if needed  Avoid stairs unsupervised  Avoid cooking unsupervised  -Follow-up with neurology clinic in 2 months. Ms. Katty Juárez received counseling on the following healthy behaviors: medical compliance, smoking cessation, blood pressure control, regular follow up with primary doctor.         Electronically signed by Francisca Alarcon MD on 7/12/2021 at 3:14 PM

## 2021-07-12 NOTE — TELEPHONE ENCOUNTER
Received auth for Ferrlecit from [employer]    Talked to Dr Elodia Rush to determine if the ferrlecit was supposed to be given with the gemzar    Per Dr Elodia Rush, hold off on ferrlecit since patient received 3 doses of venofer while in house, continue with gemzar as scheduled    Electronically signed by Hollie Sharp on 7/12/2021 at 11:05 AM

## 2021-07-16 ENCOUNTER — HOSPITAL ENCOUNTER (OUTPATIENT)
Dept: INFUSION THERAPY | Age: 56
Discharge: HOME | End: 2021-07-16
Payer: COMMERCIAL

## 2021-07-16 ENCOUNTER — TELEPHONE (OUTPATIENT)
Dept: ONCOLOGY | Age: 56
End: 2021-07-16

## 2021-07-16 VITALS
TEMPERATURE: 98.4 F | RESPIRATION RATE: 18 BRPM | SYSTOLIC BLOOD PRESSURE: 110 MMHG | DIASTOLIC BLOOD PRESSURE: 72 MMHG | HEART RATE: 82 BPM

## 2021-07-16 DIAGNOSIS — Z85.43 HISTORY OF OVARIAN CANCER: ICD-10-CM

## 2021-07-16 DIAGNOSIS — C56.9 MALIGNANT NEOPLASM OF OVARY, UNSPECIFIED LATERALITY (HCC): Primary | ICD-10-CM

## 2021-07-16 DIAGNOSIS — C79.51 CANCER, METASTATIC TO BONE (HCC): ICD-10-CM

## 2021-07-16 LAB
ABSOLUTE EOS #: 0.08 K/UL (ref 0–0.4)
ABSOLUTE IMMATURE GRANULOCYTE: ABNORMAL K/UL (ref 0–0.3)
ABSOLUTE LYMPH #: 1.43 K/UL (ref 1–4.8)
ABSOLUTE MONO #: 0.84 K/UL (ref 0.1–0.8)
ALBUMIN SERPL-MCNC: 4 G/DL (ref 3.5–5.2)
ALBUMIN/GLOBULIN RATIO: 1.5 (ref 1–2.5)
ALP BLD-CCNC: 70 U/L (ref 35–104)
ALT SERPL-CCNC: 7 U/L (ref 5–33)
ANION GAP SERPL CALCULATED.3IONS-SCNC: 13 MMOL/L (ref 9–17)
AST SERPL-CCNC: 11 U/L
BASOPHILS # BLD: 2 % (ref 0–2)
BASOPHILS ABSOLUTE: 0.17 K/UL (ref 0–0.2)
BILIRUB SERPL-MCNC: 0.11 MG/DL (ref 0.3–1.2)
BUN BLDV-MCNC: 8 MG/DL (ref 6–20)
BUN/CREAT BLD: ABNORMAL (ref 9–20)
CALCIUM SERPL-MCNC: 8.7 MG/DL (ref 8.6–10.4)
CHLORIDE BLD-SCNC: 104 MMOL/L (ref 98–107)
CO2: 23 MMOL/L (ref 20–31)
CREAT SERPL-MCNC: 0.41 MG/DL (ref 0.5–0.9)
DIFFERENTIAL TYPE: ABNORMAL
EOSINOPHILS RELATIVE PERCENT: 1 % (ref 1–4)
GFR AFRICAN AMERICAN: >60 ML/MIN
GFR NON-AFRICAN AMERICAN: >60 ML/MIN
GFR SERPL CREATININE-BSD FRML MDRD: ABNORMAL ML/MIN/{1.73_M2}
GFR SERPL CREATININE-BSD FRML MDRD: ABNORMAL ML/MIN/{1.73_M2}
GLUCOSE BLD-MCNC: 131 MG/DL (ref 70–99)
HCT VFR BLD CALC: 36.4 % (ref 36–46)
HEMOGLOBIN: 11.3 G/DL (ref 12–16)
IMMATURE GRANULOCYTES: ABNORMAL %
LYMPHOCYTES # BLD: 17 % (ref 24–44)
MCH RBC QN AUTO: 25 PG (ref 26–34)
MCHC RBC AUTO-ENTMCNC: 30.9 G/DL (ref 31–37)
MCV RBC AUTO: 80.8 FL (ref 80–100)
MONOCYTES # BLD: 10 % (ref 1–7)
MORPHOLOGY: ABNORMAL
NRBC AUTOMATED: ABNORMAL PER 100 WBC
PDW BLD-RTO: 26.6 % (ref 12.5–15.4)
PLATELET # BLD: 492 K/UL (ref 140–450)
PLATELET ESTIMATE: ABNORMAL
PMV BLD AUTO: 6.6 FL (ref 6–12)
POTASSIUM SERPL-SCNC: 4.1 MMOL/L (ref 3.7–5.3)
RBC # BLD: 4.5 M/UL (ref 4–5.2)
RBC # BLD: ABNORMAL 10*6/UL
SEG NEUTROPHILS: 70 % (ref 36–66)
SEGMENTED NEUTROPHILS ABSOLUTE COUNT: 5.88 K/UL (ref 1.8–7.7)
SODIUM BLD-SCNC: 140 MMOL/L (ref 135–144)
TOTAL PROTEIN: 6.6 G/DL (ref 6.4–8.3)
WBC # BLD: 8.4 K/UL (ref 3.5–11)
WBC # BLD: ABNORMAL 10*3/UL

## 2021-07-16 PROCEDURE — 6360000002 HC RX W HCPCS: Performed by: INTERNAL MEDICINE

## 2021-07-16 PROCEDURE — 2580000003 HC RX 258: Performed by: INTERNAL MEDICINE

## 2021-07-16 PROCEDURE — 36591 DRAW BLOOD OFF VENOUS DEVICE: CPT

## 2021-07-16 PROCEDURE — 85025 COMPLETE CBC W/AUTO DIFF WBC: CPT

## 2021-07-16 PROCEDURE — 80053 COMPREHEN METABOLIC PANEL: CPT

## 2021-07-16 PROCEDURE — 96375 TX/PRO/DX INJ NEW DRUG ADDON: CPT

## 2021-07-16 PROCEDURE — 96413 CHEMO IV INFUSION 1 HR: CPT

## 2021-07-16 RX ORDER — SODIUM CHLORIDE 0.9 % (FLUSH) 0.9 %
10 SYRINGE (ML) INJECTION PRN
Status: DISCONTINUED | OUTPATIENT
Start: 2021-07-16 | End: 2021-07-17 | Stop reason: HOSPADM

## 2021-07-16 RX ORDER — HEPARIN SODIUM (PORCINE) LOCK FLUSH IV SOLN 100 UNIT/ML 100 UNIT/ML
500 SOLUTION INTRAVENOUS PRN
Status: DISCONTINUED | OUTPATIENT
Start: 2021-07-16 | End: 2021-07-17 | Stop reason: HOSPADM

## 2021-07-16 RX ORDER — DEXAMETHASONE SODIUM PHOSPHATE 4 MG/ML
8 INJECTION, SOLUTION INTRA-ARTICULAR; INTRALESIONAL; INTRAMUSCULAR; INTRAVENOUS; SOFT TISSUE ONCE
Status: COMPLETED | OUTPATIENT
Start: 2021-07-16 | End: 2021-07-16

## 2021-07-16 RX ORDER — SODIUM CHLORIDE 9 MG/ML
20 INJECTION, SOLUTION INTRAVENOUS ONCE
Status: COMPLETED | OUTPATIENT
Start: 2021-07-16 | End: 2021-07-16

## 2021-07-16 RX ADMIN — SODIUM CHLORIDE, PRESERVATIVE FREE 10 ML: 5 INJECTION INTRAVENOUS at 13:11

## 2021-07-16 RX ADMIN — HEPARIN 500 UNITS: 100 SYRINGE at 13:11

## 2021-07-16 RX ADMIN — SODIUM CHLORIDE 20 ML/HR: 9 INJECTION, SOLUTION INTRAVENOUS at 11:57

## 2021-07-16 RX ADMIN — DEXAMETHASONE SODIUM PHOSPHATE 8 MG: 4 INJECTION, SOLUTION INTRAMUSCULAR; INTRAVENOUS at 11:59

## 2021-07-16 RX ADMIN — SODIUM CHLORIDE, PRESERVATIVE FREE 10 ML: 5 INJECTION INTRAVENOUS at 11:07

## 2021-07-16 RX ADMIN — SODIUM CHLORIDE, PRESERVATIVE FREE 10 ML: 5 INJECTION INTRAVENOUS at 11:06

## 2021-07-16 RX ADMIN — GEMCITABINE 1800 MG: 38 INJECTION, SOLUTION INTRAVENOUS at 12:27

## 2021-07-16 ASSESSMENT — PAIN DESCRIPTION - LOCATION: LOCATION: BACK

## 2021-07-16 ASSESSMENT — PAIN - FUNCTIONAL ASSESSMENT: PAIN_FUNCTIONAL_ASSESSMENT: PREVENTS OR INTERFERES SOME ACTIVE ACTIVITIES AND ADLS

## 2021-07-16 ASSESSMENT — PAIN DESCRIPTION - DESCRIPTORS: DESCRIPTORS: ACHING

## 2021-07-16 ASSESSMENT — PAIN DESCRIPTION - PAIN TYPE: TYPE: CHRONIC PAIN

## 2021-07-16 ASSESSMENT — PAIN DESCRIPTION - PROGRESSION: CLINICAL_PROGRESSION: NOT CHANGED

## 2021-07-16 ASSESSMENT — PAIN DESCRIPTION - ORIENTATION: ORIENTATION: LOWER

## 2021-07-16 ASSESSMENT — PAIN SCALES - GENERAL: PAINLEVEL_OUTOF10: 6

## 2021-07-16 ASSESSMENT — PAIN SCALES - WONG BAKER: WONGBAKER_NUMERICALRESPONSE: 6

## 2021-07-16 ASSESSMENT — PAIN DESCRIPTION - FREQUENCY: FREQUENCY: CONTINUOUS

## 2021-07-16 NOTE — PROGRESS NOTES
Pt here for C6D1. Denies any new complaints. Labs drawn from port and results reviewed. Pt was treated without incident and d/c'd in stable condition. Pt will return 7/23 for C6D8 and 8/6 for Dr visit and treatment.

## 2021-07-16 NOTE — TELEPHONE ENCOUNTER
Name: Miya Crsos [facility]  : 1965  MRN: [id number]    Oncology Navigation Follow-Up Note    Contact Type:  Medical Oncology  Notes: Pt @ [facility] for tx. Met with pt in infusion area. Pt denied questions/concerns. Instructed pt may contact writer prn. Will continue to follow.     Electronically signed by Alexandra Orta RN on 2021 at 11:28 AM

## 2021-07-23 ENCOUNTER — HOSPITAL ENCOUNTER (OUTPATIENT)
Dept: INFUSION THERAPY | Age: 56
Discharge: HOME | End: 2021-07-23
Payer: COMMERCIAL

## 2021-07-23 VITALS
HEART RATE: 89 BPM | BODY MASS INDEX: 26.82 KG/M2 | TEMPERATURE: 98.1 F | HEIGHT: 65 IN | WEIGHT: 161 LBS | SYSTOLIC BLOOD PRESSURE: 116 MMHG | RESPIRATION RATE: 16 BRPM | DIASTOLIC BLOOD PRESSURE: 52 MMHG

## 2021-07-23 DIAGNOSIS — C56.9 MALIGNANT NEOPLASM OF OVARY, UNSPECIFIED LATERALITY (HCC): Primary | ICD-10-CM

## 2021-07-23 DIAGNOSIS — C79.51 CANCER, METASTATIC TO BONE (HCC): ICD-10-CM

## 2021-07-23 DIAGNOSIS — Z85.43 HISTORY OF OVARIAN CANCER: ICD-10-CM

## 2021-07-23 LAB
ABSOLUTE EOS #: 0.14 K/UL (ref 0–0.4)
ABSOLUTE IMMATURE GRANULOCYTE: ABNORMAL K/UL (ref 0–0.3)
ABSOLUTE LYMPH #: 1.53 K/UL (ref 1–4.8)
ABSOLUTE MONO #: 0.27 K/UL (ref 0.1–1.2)
ALBUMIN SERPL-MCNC: 3.8 G/DL (ref 3.5–5.2)
ALBUMIN/GLOBULIN RATIO: 1.7 (ref 1–2.5)
ALP BLD-CCNC: 63 U/L (ref 35–104)
ALT SERPL-CCNC: <5 U/L (ref 5–33)
ANION GAP SERPL CALCULATED.3IONS-SCNC: 9 MMOL/L (ref 9–17)
AST SERPL-CCNC: 10 U/L
BASOPHILS # BLD: 2 % (ref 0–2)
BASOPHILS ABSOLUTE: 0.09 K/UL (ref 0–0.2)
BILIRUB SERPL-MCNC: 0.13 MG/DL (ref 0.3–1.2)
BUN BLDV-MCNC: 9 MG/DL (ref 6–20)
BUN/CREAT BLD: ABNORMAL (ref 9–20)
CALCIUM SERPL-MCNC: 8.6 MG/DL (ref 8.6–10.4)
CHLORIDE BLD-SCNC: 105 MMOL/L (ref 98–107)
CO2: 25 MMOL/L (ref 20–31)
CREAT SERPL-MCNC: <0.4 MG/DL (ref 0.5–0.9)
DIFFERENTIAL TYPE: ABNORMAL
EOSINOPHILS RELATIVE PERCENT: 3 % (ref 1–4)
GFR AFRICAN AMERICAN: ABNORMAL ML/MIN
GFR NON-AFRICAN AMERICAN: ABNORMAL ML/MIN
GFR SERPL CREATININE-BSD FRML MDRD: ABNORMAL ML/MIN/{1.73_M2}
GFR SERPL CREATININE-BSD FRML MDRD: ABNORMAL ML/MIN/{1.73_M2}
GLUCOSE BLD-MCNC: 94 MG/DL (ref 70–99)
HCT VFR BLD CALC: 33.6 % (ref 36–46)
HEMOGLOBIN: 10.3 G/DL (ref 12–16)
IMMATURE GRANULOCYTES: ABNORMAL %
LYMPHOCYTES # BLD: 34 % (ref 24–44)
MCH RBC QN AUTO: 25 PG (ref 26–34)
MCHC RBC AUTO-ENTMCNC: 30.5 G/DL (ref 31–37)
MCV RBC AUTO: 82 FL (ref 80–100)
MONOCYTES # BLD: 6 % (ref 2–11)
MORPHOLOGY: ABNORMAL
NRBC AUTOMATED: ABNORMAL PER 100 WBC
PDW BLD-RTO: 25.2 % (ref 12.5–15.4)
PLATELET # BLD: 385 K/UL (ref 140–450)
PLATELET ESTIMATE: ABNORMAL
PMV BLD AUTO: 6.4 FL (ref 6–12)
POTASSIUM SERPL-SCNC: 4 MMOL/L (ref 3.7–5.3)
RBC # BLD: 4.1 M/UL (ref 4–5.2)
RBC # BLD: ABNORMAL 10*6/UL
SEG NEUTROPHILS: 55 % (ref 36–66)
SEGMENTED NEUTROPHILS ABSOLUTE COUNT: 2.47 K/UL (ref 1.8–7.7)
SODIUM BLD-SCNC: 139 MMOL/L (ref 135–144)
TOTAL PROTEIN: 6.1 G/DL (ref 6.4–8.3)
WBC # BLD: 4.5 K/UL (ref 3.5–11)
WBC # BLD: ABNORMAL 10*3/UL

## 2021-07-23 PROCEDURE — 96375 TX/PRO/DX INJ NEW DRUG ADDON: CPT

## 2021-07-23 PROCEDURE — 36591 DRAW BLOOD OFF VENOUS DEVICE: CPT

## 2021-07-23 PROCEDURE — 96413 CHEMO IV INFUSION 1 HR: CPT

## 2021-07-23 PROCEDURE — 2580000003 HC RX 258: Performed by: INTERNAL MEDICINE

## 2021-07-23 PROCEDURE — 85025 COMPLETE CBC W/AUTO DIFF WBC: CPT

## 2021-07-23 PROCEDURE — 80053 COMPREHEN METABOLIC PANEL: CPT

## 2021-07-23 PROCEDURE — 6360000002 HC RX W HCPCS: Performed by: INTERNAL MEDICINE

## 2021-07-23 RX ORDER — SODIUM CHLORIDE 0.9 % (FLUSH) 0.9 %
10 SYRINGE (ML) INJECTION PRN
Status: DISCONTINUED | OUTPATIENT
Start: 2021-07-23 | End: 2021-07-24 | Stop reason: HOSPADM

## 2021-07-23 RX ORDER — HEPARIN SODIUM (PORCINE) LOCK FLUSH IV SOLN 100 UNIT/ML 100 UNIT/ML
500 SOLUTION INTRAVENOUS PRN
Status: DISCONTINUED | OUTPATIENT
Start: 2021-07-23 | End: 2021-07-24 | Stop reason: HOSPADM

## 2021-07-23 RX ORDER — SODIUM CHLORIDE 9 MG/ML
20 INJECTION, SOLUTION INTRAVENOUS ONCE
Status: COMPLETED | OUTPATIENT
Start: 2021-07-23 | End: 2021-07-23

## 2021-07-23 RX ORDER — DEXAMETHASONE SODIUM PHOSPHATE 4 MG/ML
8 INJECTION, SOLUTION INTRA-ARTICULAR; INTRALESIONAL; INTRAMUSCULAR; INTRAVENOUS; SOFT TISSUE ONCE
Status: COMPLETED | OUTPATIENT
Start: 2021-07-23 | End: 2021-07-23

## 2021-07-23 RX ADMIN — HEPARIN 500 UNITS: 100 SYRINGE at 11:25

## 2021-07-23 RX ADMIN — DEXAMETHASONE SODIUM PHOSPHATE 8 MG: 4 INJECTION, SOLUTION INTRAMUSCULAR; INTRAVENOUS at 10:07

## 2021-07-23 RX ADMIN — SODIUM CHLORIDE 20 ML/HR: 9 INJECTION, SOLUTION INTRAVENOUS at 10:07

## 2021-07-23 RX ADMIN — SODIUM CHLORIDE, PRESERVATIVE FREE 10 ML: 5 INJECTION INTRAVENOUS at 11:25

## 2021-07-23 RX ADMIN — SODIUM CHLORIDE, PRESERVATIVE FREE 10 ML: 5 INJECTION INTRAVENOUS at 09:15

## 2021-07-23 RX ADMIN — GEMCITABINE 1800 MG: 38 INJECTION, SOLUTION INTRAVENOUS at 10:50

## 2021-07-23 ASSESSMENT — PAIN DESCRIPTION - PAIN TYPE: TYPE: CHRONIC PAIN

## 2021-07-23 ASSESSMENT — PAIN DESCRIPTION - ORIENTATION: ORIENTATION: LOWER

## 2021-07-23 ASSESSMENT — PAIN DESCRIPTION - LOCATION: LOCATION: BACK

## 2021-07-23 ASSESSMENT — PAIN SCALES - GENERAL: PAINLEVEL_OUTOF10: 7

## 2021-07-23 NOTE — PROGRESS NOTES
Pt here for C.6D.8 Gemzar. Arrives ambulatory, accompanied by daughter. Denies any new complaints. Labs drawn from port, results reviewed. Tx complete without incident. Pt d/c'd in stable condition. Returns 8/6/21 for f/u with Dr. April Ac and C.7D.1 tx.

## 2021-07-26 DIAGNOSIS — M54.59 INTRACTABLE LOW BACK PAIN: ICD-10-CM

## 2021-07-26 DIAGNOSIS — C79.51 CANCER, METASTATIC TO BONE (HCC): ICD-10-CM

## 2021-07-26 RX ORDER — OXYCODONE HYDROCHLORIDE AND ACETAMINOPHEN 5; 325 MG/1; MG/1
TABLET ORAL
Qty: 180 TABLET | Refills: 0 | Status: SHIPPED | OUTPATIENT
Start: 2021-07-26 | End: 2021-08-26

## 2021-07-30 ENCOUNTER — TELEPHONE (OUTPATIENT)
Dept: NEUROSURGERY | Age: 56
End: 2021-07-30

## 2021-07-30 DIAGNOSIS — C79.51 CANCER, METASTATIC TO BONE (HCC): ICD-10-CM

## 2021-07-30 DIAGNOSIS — M54.59 INTRACTABLE LOW BACK PAIN: ICD-10-CM

## 2021-07-30 RX ORDER — LAMOTRIGINE 25 MG/1
TABLET ORAL
Qty: 180 TABLET | Refills: 1 | Status: SHIPPED | OUTPATIENT
Start: 2021-07-30 | End: 2021-10-05

## 2021-07-30 NOTE — TELEPHONE ENCOUNTER
Patient calling for refill of Lamictal.    Date of last fill: 07/12/21    Date of next follow up appointment: 09/13/21    Pt notified response time for refills is 24-48 hours

## 2021-08-02 ENCOUNTER — TELEPHONE (OUTPATIENT)
Dept: NEUROSURGERY | Age: 56
End: 2021-08-02

## 2021-08-02 DIAGNOSIS — C79.51 CANCER, METASTATIC TO BONE (HCC): ICD-10-CM

## 2021-08-02 RX ORDER — MORPHINE SULFATE 30 MG/1
TABLET, FILM COATED, EXTENDED RELEASE ORAL
Qty: 60 TABLET | Refills: 0 | Status: SHIPPED | OUTPATIENT
Start: 2021-08-02 | End: 2021-09-02

## 2021-08-02 NOTE — TELEPHONE ENCOUNTER
Patient thought she was told to increase to 200mg eventually but with how script is currently written for 3 tabs am/pm that would only be 150mg.  Patient wants to confirm dose amount

## 2021-08-06 ENCOUNTER — TELEPHONE (OUTPATIENT)
Dept: ONCOLOGY | Age: 56
End: 2021-08-06

## 2021-08-06 ENCOUNTER — OFFICE VISIT (OUTPATIENT)
Dept: ONCOLOGY | Age: 56
End: 2021-08-06
Payer: COMMERCIAL

## 2021-08-06 ENCOUNTER — HOSPITAL ENCOUNTER (OUTPATIENT)
Dept: INFUSION THERAPY | Age: 56
Discharge: HOME | End: 2021-08-06
Payer: COMMERCIAL

## 2021-08-06 VITALS
HEART RATE: 118 BPM | BODY MASS INDEX: 26.89 KG/M2 | SYSTOLIC BLOOD PRESSURE: 100 MMHG | DIASTOLIC BLOOD PRESSURE: 64 MMHG | WEIGHT: 161.6 LBS | TEMPERATURE: 99.6 F

## 2021-08-06 DIAGNOSIS — C79.51 CANCER, METASTATIC TO BONE (HCC): ICD-10-CM

## 2021-08-06 DIAGNOSIS — C56.9 MALIGNANT NEOPLASM OF OVARY, UNSPECIFIED LATERALITY (HCC): Primary | ICD-10-CM

## 2021-08-06 DIAGNOSIS — Z85.43 HISTORY OF OVARIAN CANCER: ICD-10-CM

## 2021-08-06 LAB
ABSOLUTE EOS #: 0.15 K/UL (ref 0–0.4)
ABSOLUTE IMMATURE GRANULOCYTE: ABNORMAL K/UL (ref 0–0.3)
ABSOLUTE LYMPH #: 1.65 K/UL (ref 1–4.8)
ABSOLUTE MONO #: 0.9 K/UL (ref 0.1–0.8)
ALBUMIN SERPL-MCNC: 3.8 G/DL (ref 3.5–5.2)
ALBUMIN/GLOBULIN RATIO: 1.5 (ref 1–2.5)
ALP BLD-CCNC: 71 U/L (ref 35–104)
ALT SERPL-CCNC: <5 U/L (ref 5–33)
ANION GAP SERPL CALCULATED.3IONS-SCNC: 12 MMOL/L (ref 9–17)
AST SERPL-CCNC: 8 U/L
BASOPHILS # BLD: 0 % (ref 0–2)
BASOPHILS ABSOLUTE: 0 K/UL (ref 0–0.2)
BILIRUB SERPL-MCNC: 0.12 MG/DL (ref 0.3–1.2)
BUN BLDV-MCNC: 12 MG/DL (ref 6–20)
BUN/CREAT BLD: ABNORMAL (ref 9–20)
CALCIUM SERPL-MCNC: 8.8 MG/DL (ref 8.6–10.4)
CHLORIDE BLD-SCNC: 104 MMOL/L (ref 98–107)
CO2: 24 MMOL/L (ref 20–31)
CREAT SERPL-MCNC: 0.48 MG/DL (ref 0.5–0.9)
DIFFERENTIAL TYPE: ABNORMAL
EOSINOPHILS RELATIVE PERCENT: 2 % (ref 1–4)
GFR AFRICAN AMERICAN: >60 ML/MIN
GFR NON-AFRICAN AMERICAN: >60 ML/MIN
GFR SERPL CREATININE-BSD FRML MDRD: ABNORMAL ML/MIN/{1.73_M2}
GFR SERPL CREATININE-BSD FRML MDRD: ABNORMAL ML/MIN/{1.73_M2}
GLUCOSE BLD-MCNC: 142 MG/DL (ref 70–99)
HCT VFR BLD CALC: 31.6 % (ref 36–46)
HEMOGLOBIN: 10 G/DL (ref 12–16)
IMMATURE GRANULOCYTES: ABNORMAL %
LYMPHOCYTES # BLD: 22 % (ref 24–44)
MCH RBC QN AUTO: 25.8 PG (ref 26–34)
MCHC RBC AUTO-ENTMCNC: 31.7 G/DL (ref 31–37)
MCV RBC AUTO: 81.3 FL (ref 80–100)
MONOCYTES # BLD: 12 % (ref 1–7)
MORPHOLOGY: ABNORMAL
MORPHOLOGY: ABNORMAL
NRBC AUTOMATED: ABNORMAL PER 100 WBC
PDW BLD-RTO: 23.9 % (ref 12.5–15.4)
PLATELET # BLD: 1167 K/UL (ref 140–450)
PLATELET ESTIMATE: ABNORMAL
PMV BLD AUTO: 8.9 FL (ref 8–14)
POTASSIUM SERPL-SCNC: 4.1 MMOL/L (ref 3.7–5.3)
RBC # BLD: 3.89 M/UL (ref 4–5.2)
RBC # BLD: ABNORMAL 10*6/UL
SEG NEUTROPHILS: 64 % (ref 36–66)
SEGMENTED NEUTROPHILS ABSOLUTE COUNT: 4.8 K/UL (ref 1.8–7.7)
SODIUM BLD-SCNC: 140 MMOL/L (ref 135–144)
TOTAL PROTEIN: 6.3 G/DL (ref 6.4–8.3)
WBC # BLD: 7.5 K/UL (ref 3.5–11)
WBC # BLD: ABNORMAL 10*3/UL

## 2021-08-06 PROCEDURE — 6360000002 HC RX W HCPCS: Performed by: INTERNAL MEDICINE

## 2021-08-06 PROCEDURE — 2580000003 HC RX 258: Performed by: INTERNAL MEDICINE

## 2021-08-06 PROCEDURE — 96375 TX/PRO/DX INJ NEW DRUG ADDON: CPT | Performed by: NURSE PRACTITIONER

## 2021-08-06 PROCEDURE — G8417 CALC BMI ABV UP PARAM F/U: HCPCS | Performed by: INTERNAL MEDICINE

## 2021-08-06 PROCEDURE — 96413 CHEMO IV INFUSION 1 HR: CPT | Performed by: NURSE PRACTITIONER

## 2021-08-06 PROCEDURE — G8428 CUR MEDS NOT DOCUMENT: HCPCS | Performed by: INTERNAL MEDICINE

## 2021-08-06 PROCEDURE — 99214 OFFICE O/P EST MOD 30 MIN: CPT | Performed by: INTERNAL MEDICINE

## 2021-08-06 PROCEDURE — 4004F PT TOBACCO SCREEN RCVD TLK: CPT | Performed by: INTERNAL MEDICINE

## 2021-08-06 PROCEDURE — 36591 DRAW BLOOD OFF VENOUS DEVICE: CPT | Performed by: NURSE PRACTITIONER

## 2021-08-06 PROCEDURE — 85025 COMPLETE CBC W/AUTO DIFF WBC: CPT

## 2021-08-06 PROCEDURE — 3017F COLORECTAL CA SCREEN DOC REV: CPT | Performed by: INTERNAL MEDICINE

## 2021-08-06 PROCEDURE — 80053 COMPREHEN METABOLIC PANEL: CPT

## 2021-08-06 RX ORDER — SODIUM CHLORIDE 0.9 % (FLUSH) 0.9 %
10 SYRINGE (ML) INJECTION PRN
Status: DISCONTINUED | OUTPATIENT
Start: 2021-08-06 | End: 2021-08-07 | Stop reason: HOSPADM

## 2021-08-06 RX ORDER — SODIUM CHLORIDE 0.9 % (FLUSH) 0.9 %
10 SYRINGE (ML) INJECTION PRN
Status: CANCELLED | OUTPATIENT
Start: 2021-09-17

## 2021-08-06 RX ORDER — HEPARIN SODIUM (PORCINE) LOCK FLUSH IV SOLN 100 UNIT/ML 100 UNIT/ML
500 SOLUTION INTRAVENOUS PRN
Status: CANCELLED | OUTPATIENT
Start: 2021-08-13

## 2021-08-06 RX ORDER — SODIUM CHLORIDE 9 MG/ML
20 INJECTION, SOLUTION INTRAVENOUS ONCE
Status: CANCELLED | OUTPATIENT
Start: 2021-08-13 | End: 2021-08-13

## 2021-08-06 RX ORDER — HEPARIN SODIUM (PORCINE) LOCK FLUSH IV SOLN 100 UNIT/ML 100 UNIT/ML
500 SOLUTION INTRAVENOUS PRN
Status: DISCONTINUED | OUTPATIENT
Start: 2021-08-06 | End: 2021-08-07 | Stop reason: HOSPADM

## 2021-08-06 RX ORDER — SODIUM CHLORIDE 9 MG/ML
INJECTION, SOLUTION INTRAVENOUS CONTINUOUS
Status: CANCELLED | OUTPATIENT
Start: 2021-09-17

## 2021-08-06 RX ORDER — METHYLPREDNISOLONE SODIUM SUCCINATE 125 MG/2ML
125 INJECTION, POWDER, LYOPHILIZED, FOR SOLUTION INTRAMUSCULAR; INTRAVENOUS ONCE
Status: CANCELLED | OUTPATIENT
Start: 2021-08-06 | End: 2021-08-06

## 2021-08-06 RX ORDER — METHYLPREDNISOLONE SODIUM SUCCINATE 125 MG/2ML
125 INJECTION, POWDER, LYOPHILIZED, FOR SOLUTION INTRAMUSCULAR; INTRAVENOUS ONCE
Status: CANCELLED | OUTPATIENT
Start: 2021-09-10 | End: 2021-08-27

## 2021-08-06 RX ORDER — SODIUM CHLORIDE 0.9 % (FLUSH) 0.9 %
5 SYRINGE (ML) INJECTION PRN
Status: CANCELLED | OUTPATIENT
Start: 2021-09-17

## 2021-08-06 RX ORDER — HEPARIN SODIUM (PORCINE) LOCK FLUSH IV SOLN 100 UNIT/ML 100 UNIT/ML
500 SOLUTION INTRAVENOUS PRN
Status: CANCELLED | OUTPATIENT
Start: 2021-09-10

## 2021-08-06 RX ORDER — MORPHINE SULFATE 15 MG/1
15 TABLET, FILM COATED, EXTENDED RELEASE ORAL 2 TIMES DAILY
Qty: 60 TABLET | Refills: 0 | Status: SHIPPED | OUTPATIENT
Start: 2021-08-06 | End: 2021-09-02

## 2021-08-06 RX ORDER — EPINEPHRINE 1 MG/ML
0.3 INJECTION, SOLUTION, CONCENTRATE INTRAVENOUS PRN
Status: CANCELLED | OUTPATIENT
Start: 2021-08-06

## 2021-08-06 RX ORDER — EPINEPHRINE 1 MG/ML
0.3 INJECTION, SOLUTION, CONCENTRATE INTRAVENOUS PRN
Status: CANCELLED | OUTPATIENT
Start: 2021-09-10

## 2021-08-06 RX ORDER — DIPHENHYDRAMINE HYDROCHLORIDE 50 MG/ML
50 INJECTION INTRAMUSCULAR; INTRAVENOUS ONCE
Status: CANCELLED | OUTPATIENT
Start: 2021-09-17 | End: 2021-09-03

## 2021-08-06 RX ORDER — SODIUM CHLORIDE 0.9 % (FLUSH) 0.9 %
10 SYRINGE (ML) INJECTION PRN
Status: CANCELLED | OUTPATIENT
Start: 2021-08-06

## 2021-08-06 RX ORDER — METHYLPREDNISOLONE SODIUM SUCCINATE 125 MG/2ML
125 INJECTION, POWDER, LYOPHILIZED, FOR SOLUTION INTRAMUSCULAR; INTRAVENOUS ONCE
Status: CANCELLED | OUTPATIENT
Start: 2021-08-13 | End: 2021-08-13

## 2021-08-06 RX ORDER — HEPARIN SODIUM (PORCINE) LOCK FLUSH IV SOLN 100 UNIT/ML 100 UNIT/ML
500 SOLUTION INTRAVENOUS PRN
Status: CANCELLED | OUTPATIENT
Start: 2021-09-17

## 2021-08-06 RX ORDER — DIPHENHYDRAMINE HYDROCHLORIDE 50 MG/ML
50 INJECTION INTRAMUSCULAR; INTRAVENOUS ONCE
Status: CANCELLED | OUTPATIENT
Start: 2021-09-10 | End: 2021-08-27

## 2021-08-06 RX ORDER — DEXAMETHASONE SODIUM PHOSPHATE 4 MG/ML
8 INJECTION, SOLUTION INTRA-ARTICULAR; INTRALESIONAL; INTRAMUSCULAR; INTRAVENOUS; SOFT TISSUE ONCE
Status: COMPLETED | OUTPATIENT
Start: 2021-08-06 | End: 2021-08-06

## 2021-08-06 RX ORDER — SODIUM CHLORIDE 0.9 % (FLUSH) 0.9 %
10 SYRINGE (ML) INJECTION PRN
Status: CANCELLED | OUTPATIENT
Start: 2021-08-13

## 2021-08-06 RX ORDER — SODIUM CHLORIDE 9 MG/ML
20 INJECTION, SOLUTION INTRAVENOUS ONCE
Status: CANCELLED | OUTPATIENT
Start: 2021-09-17 | End: 2021-09-03

## 2021-08-06 RX ORDER — SODIUM CHLORIDE 9 MG/ML
INJECTION, SOLUTION INTRAVENOUS CONTINUOUS
Status: CANCELLED | OUTPATIENT
Start: 2021-09-10

## 2021-08-06 RX ORDER — SODIUM CHLORIDE 9 MG/ML
INJECTION, SOLUTION INTRAVENOUS CONTINUOUS
Status: CANCELLED | OUTPATIENT
Start: 2021-08-06

## 2021-08-06 RX ORDER — SODIUM CHLORIDE 9 MG/ML
INJECTION, SOLUTION INTRAVENOUS CONTINUOUS
Status: CANCELLED | OUTPATIENT
Start: 2021-08-13

## 2021-08-06 RX ORDER — EPINEPHRINE 1 MG/ML
0.3 INJECTION, SOLUTION, CONCENTRATE INTRAVENOUS PRN
Status: CANCELLED | OUTPATIENT
Start: 2021-09-17

## 2021-08-06 RX ORDER — DIPHENHYDRAMINE HYDROCHLORIDE 50 MG/ML
50 INJECTION INTRAMUSCULAR; INTRAVENOUS ONCE
Status: CANCELLED | OUTPATIENT
Start: 2021-08-06 | End: 2021-08-06

## 2021-08-06 RX ORDER — METHYLPREDNISOLONE SODIUM SUCCINATE 125 MG/2ML
125 INJECTION, POWDER, LYOPHILIZED, FOR SOLUTION INTRAMUSCULAR; INTRAVENOUS ONCE
Status: CANCELLED | OUTPATIENT
Start: 2021-09-17 | End: 2021-09-03

## 2021-08-06 RX ORDER — SODIUM CHLORIDE 9 MG/ML
20 INJECTION, SOLUTION INTRAVENOUS ONCE
Status: CANCELLED | OUTPATIENT
Start: 2021-09-10 | End: 2021-08-27

## 2021-08-06 RX ORDER — HEPARIN SODIUM (PORCINE) LOCK FLUSH IV SOLN 100 UNIT/ML 100 UNIT/ML
500 SOLUTION INTRAVENOUS PRN
Status: CANCELLED | OUTPATIENT
Start: 2021-08-06

## 2021-08-06 RX ORDER — SODIUM CHLORIDE 9 MG/ML
20 INJECTION, SOLUTION INTRAVENOUS ONCE
Status: CANCELLED | OUTPATIENT
Start: 2021-08-06 | End: 2021-08-06

## 2021-08-06 RX ORDER — SODIUM CHLORIDE 0.9 % (FLUSH) 0.9 %
10 SYRINGE (ML) INJECTION PRN
Status: CANCELLED | OUTPATIENT
Start: 2021-09-10

## 2021-08-06 RX ORDER — SODIUM CHLORIDE 0.9 % (FLUSH) 0.9 %
5 SYRINGE (ML) INJECTION PRN
Status: CANCELLED | OUTPATIENT
Start: 2021-08-06

## 2021-08-06 RX ORDER — SODIUM CHLORIDE 0.9 % (FLUSH) 0.9 %
5 SYRINGE (ML) INJECTION PRN
Status: CANCELLED | OUTPATIENT
Start: 2021-08-13

## 2021-08-06 RX ORDER — EPINEPHRINE 1 MG/ML
0.3 INJECTION, SOLUTION, CONCENTRATE INTRAVENOUS PRN
Status: CANCELLED | OUTPATIENT
Start: 2021-08-13

## 2021-08-06 RX ORDER — DIPHENHYDRAMINE HYDROCHLORIDE 50 MG/ML
50 INJECTION INTRAMUSCULAR; INTRAVENOUS ONCE
Status: CANCELLED | OUTPATIENT
Start: 2021-08-13 | End: 2021-08-13

## 2021-08-06 RX ORDER — SODIUM CHLORIDE 0.9 % (FLUSH) 0.9 %
5 SYRINGE (ML) INJECTION PRN
Status: CANCELLED | OUTPATIENT
Start: 2021-09-10

## 2021-08-06 RX ORDER — SODIUM CHLORIDE 9 MG/ML
20 INJECTION, SOLUTION INTRAVENOUS ONCE
Status: DISCONTINUED | OUTPATIENT
Start: 2021-08-06 | End: 2021-08-07 | Stop reason: HOSPADM

## 2021-08-06 RX ADMIN — SODIUM CHLORIDE, PRESERVATIVE FREE 10 ML: 5 INJECTION INTRAVENOUS at 14:49

## 2021-08-06 RX ADMIN — DEXAMETHASONE SODIUM PHOSPHATE 8 MG: 4 INJECTION, SOLUTION INTRAMUSCULAR; INTRAVENOUS at 13:54

## 2021-08-06 RX ADMIN — HEPARIN 500 UNITS: 100 SYRINGE at 14:50

## 2021-08-06 RX ADMIN — SODIUM CHLORIDE 20 ML/HR: 9 INJECTION, SOLUTION INTRAVENOUS at 13:45

## 2021-08-06 RX ADMIN — GEMCITABINE HYDROCHLORIDE 1800 MG: 1 INJECTION, SOLUTION INTRAVENOUS at 14:11

## 2021-08-06 NOTE — PROGRESS NOTES
Patient seen by Dr. Don Reyna today, see his notes for visit details. Labs within treatable parameters. Chemo today as scheduled and ordered, clarified with Dr. Don Reyna. Patient completed her treatment without incident. Patient stable and ambulatory at d/c. Accompanied by her daughter.

## 2021-08-06 NOTE — TELEPHONE ENCOUNTER
AVS from 8/6/21     Chemo  as per schedule.   RV 3-4 weeks       *chemo as sched  *rv is sched for [email or web address] w/tx to follow    PT was given AVS and an appt schedule

## 2021-08-08 NOTE — PROGRESS NOTES
_      Chief Complaint   Patient presents with    Follow-up     DIAGNOSIS:       Recurrent ovarian cancer. Original diagnosis 2005 with multiple recurrences. Diffuse metastasis. CURRENT THERAPY:         Doxil/ Avastin started 9/18/2020. Avastin was discontinued due to recent GI bleeding. Status post recent vascular embolization  S/p seizure disorder. Gemzar started 2/26/2021. Interrupted due to recent hospitalization    BRIEF CASE HISTORY:      Ms. Gerardo Madrigal is a very pleasant 60 y.o. female with history of multiple co morbidities as listed. The patient seen in consultation for ovarian cancer with multiple recurrences. 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 She had surgical debulking and she had systemic treatment with Taxol and carboplatin for 6 cycles. Patient was in remission for about 2 years. She had a relapse in 2007 and treated with topotecan with good results. Patient relapsed again in 2008 and was treated with Taxol carboplatin. After 3 cycles of systemic chemotherapy she had problems with carboplatin so she finished 3 more cycles with Taxol. She did well again for 2 to 3 years until she had a relapse in 2012 and she had intraperitoneal chemotherapy treatment with Taxol. Patient was last seen by her oncologist in 2014 at which time she had relatively stable disease with normal tumor marker and no significant abnormal images. Patient moved to [place] after that and she was lost for oncology follow-ups. Patient was recently evaluated again here in [facility] because of abdominal discomfort. Re imaging confirmed metastatic relapse. Biopsy confirmed ovarian cancer recurrence. Scan showed extensive intraabdominal and splenic involvement and lung mets. She has no symptoms at the present time.    Patient denies smoking or alcohol drinking.l    INTERIM HISTORY:    patient is seen for follow up recurrent ovarian cancer. Patient received 1 cycle of palliative chemotherapy using Doxil/ Avastin. Patient was hospitalized at [facility] last week because of severe GI bleeding. She had multiple blood transfusions. She had GI and vascular evaluation. She had evidence of intra-abdominal mass at the splenic area with GI infiltration. Patient had embolization by vascular. Recently readmitted because of a seizure disorder. Images showed no brain mets. Currently seizures are controlled. Patient was hospitalized due to left leg DVT and anemia. She was treated accordingly. She received Eliquis. Tolerated well. Admitted last week with seizures. Treatment adjusted. No more seizures activity. Started on Gemzar. Tolerated well. No melena or hematochezia. No hematemesis. PAST MEDICAL HISTORY: has a past medical history of Anemia, Bleeding, Cervical [facility] [address] [address] Depression, Diabetes mellitus ([facility] [address] GERD (gastroesophageal reflux disease), Hx of blood clots, Hypertension, Metastatic cancer ([address] Ovarian cancer ([facility] [address] Post chemo evaluation, and Splenic lesion. PAST SURGICAL HISTORY: has a past surgical history that includes Hysterectomy, total abdominal; Port Surgery; Tonsillectomy; IR PORT PLACEMENT > 5 YEARS (08/24/2020); Anus surgery; Abscess Drainage (2013); colectomy (03/2013); IR EMBOLIZATION HEMORRHAGE (10/05/2020); and Cardiac catheterization. CURRENT MEDICATIONS:  has a current medication list which includes the following prescription(s): morphine, morphine, lamotrigine, oxycodone-acetaminophen, levetiracetam, ondansetron, sertraline, eliquis, pantoprazole, ferrous sulfate, and aspirin. ALLERGIES:  is allergic to ceftriaxone. FAMILY HISTORY: Negative for any hematological or oncological conditions. SOCIAL HISTORY:  reports that she has been smoking cigarettes. She has been smoking about 1.00 pack per day. She uses smokeless tobacco. She reports previous alcohol use. She reports that she does not use drugs. REVIEW OF SYSTEMS:     · General: No weakness or fatigue. No unanticipated weight loss or decreased appetite. No fever or chills. · Eyes: No blurred vision, eye pain or double vision. · Ears: No hearing problems or drainage. No tinnitus. · Throat: No sore throat, problems with swallowing or dysphagia. · Respiratory: No cough, sputum or hemoptysis. No shortness of breath. No pleuritic chest pain. · Cardiovascular: No chest pain, orthopnea or PND. No lower extremity edema. No palpitation. · Gastrointestinal: As above. · Genitourinary: No dysuria, hematuria, frequency or urgency. · Musculoskeletal: No muscle aches or pains. No limitation of movement. No back pain. No gait disturbance, No joint complaints. · Dermatologic: No skin rashes or pruritus. No skin lesions or discolorations. · Psychiatric: No depression, anxiety, or stress or signs of schizophrenia. No change in mood or affect. · Hematologic: No history of bleeding tendency. No bruises or ecchymosis. No history of clotting problems. · Infectious disease: No fever, chills or frequent infections. · Endocrine: No polydipsia or polyuria. No temperature intolerance. · Neurologic: No headaches or dizziness. No weakness or numbness of the extremities. No changes in balance, coordination,  memory, mentation, behavior. · Allergic/Immunologic: No nasal congestion or hives. No repeated infections. PHYSICAL EXAM:  The patient is not in acute distress. Vital signs: Blood pressure 100/64, pulse 118, temperature 99.6 °F (37.6 °C), weight 161 lb 9.6 oz (73.3 kg).      General appearance - well appearing, not in pain or distress  Mental status - good mood, alert and oriented  Eyes - pupils equal and reactive, extraocular eye movements intact  Ears - bilateral TM's and external ear canals normal  Nose - normal and patent, no erythema, discharge or polyps  Mouth - mucous membranes moist, pharynx normal without lesions  Neck - supple, no significant adenopathy  Lymphatics - no palpable lymphadenopathy, no hepatosplenomegaly  Chest - clear to auscultation, no wheezes, rales or rhonchi, symmetric air entry  Heart - normal rate, regular rhythm, normal S1, S2, no murmurs, rubs, clicks or gallops  Abdomen - soft, nontender, nondistended, no masses or organomegaly  Neurological - alert, oriented, normal speech, no focal findings or movement disorder noted  Musculoskeletal - no joint tenderness, deformity or swelling  Extremities - peripheral pulses normal, no pedal edema, no clubbing or cyanosis  Skin - normal coloration and turgor, no rashes, no suspicious skin lesions noted     Review of Diagnostic data:   Lab Results   Component Value Date    WBC 7.5 08/06/2021    HGB 10.0 (L) 08/06/2021    HCT 31.6 (L) 08/06/2021    MCV 81.3 08/06/2021    PLT 1,167 (HH) 08/06/2021       Chemistry        Component Value Date/Time     08/06/2021 1228    K 4.1 08/06/2021 1228     08/06/2021 1228    CO2 24 08/06/2021 1228    BUN 12 08/06/2021 1228    CREATININE 0.48 (L) 08/06/2021 1228        Component Value Date/Time    CALCIUM 8.8 08/06/2021 1228    ALKPHOS 71 08/06/2021 1228    AST 8 08/06/2021 1228    ALT <5 (L) 08/06/2021 1228    BILITOT 0.12 (L) 08/06/2021 1228          Lab Results   Component Value Date     388 (H) 07/09/2021         IMPRESSION:   Recurrent ovarian cancer. Original diagnosis 2005 with multiple recurrences. Diffuse metastasis. Recent active intra-abdominal bleeding due to splenic mass with GI infiltration. Status post embolization  S/p seizure disorder. PLAN:   Discussed palliative treatment. Different regimens were discussed. We prescribed Doxil as single agent but insurance declined. Patient was started on Doxil and Avastin. She tolerated treatment fairly well.   However due to recent episode of intra-abdominal bleeding we will discontinue Avastin. Next treatment will be with the use of Doxil. Patient had embolization by vascular. Reccent hospitalization for seizure. No brain mets. Seizures controlled. Hospitalized last week with left leg DVT and anemia. Hemoglobin is now stable. She received blood transfusion. No active bleeding. She is currently on Eliquis for DVT. Tolerated well with no side effects. We will give blood transfusion for symptomatic anemia. We will proceed with Gemzar chemotherapy today. Reminded about possible side effects. She agreed. Monitor toxicity and response to treatment  Patient's questions were answered to the best of her satisfaction and she verbalized full understanding and agreement.

## 2021-08-13 ENCOUNTER — HOSPITAL ENCOUNTER (OUTPATIENT)
Dept: INFUSION THERAPY | Age: 56
Discharge: HOME | End: 2021-08-13
Payer: COMMERCIAL

## 2021-08-13 VITALS
HEART RATE: 88 BPM | WEIGHT: 159.3 LBS | BODY MASS INDEX: 26.51 KG/M2 | TEMPERATURE: 98.2 F | RESPIRATION RATE: 16 BRPM | DIASTOLIC BLOOD PRESSURE: 66 MMHG | SYSTOLIC BLOOD PRESSURE: 113 MMHG

## 2021-08-13 DIAGNOSIS — C79.51 CANCER, METASTATIC TO BONE (HCC): ICD-10-CM

## 2021-08-13 DIAGNOSIS — C56.9 MALIGNANT NEOPLASM OF OVARY, UNSPECIFIED LATERALITY (HCC): Primary | ICD-10-CM

## 2021-08-13 DIAGNOSIS — Z85.43 HISTORY OF OVARIAN CANCER: ICD-10-CM

## 2021-08-13 LAB
ABSOLUTE EOS #: 0.06 K/UL (ref 0–0.4)
ABSOLUTE IMMATURE GRANULOCYTE: ABNORMAL K/UL (ref 0–0.3)
ABSOLUTE LYMPH #: 1.77 K/UL (ref 1–4.8)
ABSOLUTE MONO #: 0.49 K/UL (ref 0.1–1.2)
ALBUMIN SERPL-MCNC: 3.7 G/DL (ref 3.5–5.2)
ALBUMIN/GLOBULIN RATIO: 1.3 (ref 1–2.5)
ALP BLD-CCNC: 75 U/L (ref 35–104)
ALT SERPL-CCNC: 7 U/L (ref 5–33)
ANION GAP SERPL CALCULATED.3IONS-SCNC: 9 MMOL/L (ref 9–17)
AST SERPL-CCNC: 10 U/L
BASOPHILS # BLD: 2 % (ref 0–2)
BASOPHILS ABSOLUTE: 0.12 K/UL (ref 0–0.2)
BILIRUB SERPL-MCNC: <0.1 MG/DL (ref 0.3–1.2)
BUN BLDV-MCNC: 10 MG/DL (ref 6–20)
BUN/CREAT BLD: ABNORMAL (ref 9–20)
CALCIUM SERPL-MCNC: 8.8 MG/DL (ref 8.6–10.4)
CHLORIDE BLD-SCNC: 103 MMOL/L (ref 98–107)
CO2: 26 MMOL/L (ref 20–31)
CREAT SERPL-MCNC: 0.41 MG/DL (ref 0.5–0.9)
DIFFERENTIAL TYPE: ABNORMAL
EOSINOPHILS RELATIVE PERCENT: 1 % (ref 1–4)
GFR AFRICAN AMERICAN: >60 ML/MIN
GFR NON-AFRICAN AMERICAN: >60 ML/MIN
GFR SERPL CREATININE-BSD FRML MDRD: ABNORMAL ML/MIN/{1.73_M2}
GFR SERPL CREATININE-BSD FRML MDRD: ABNORMAL ML/MIN/{1.73_M2}
GLUCOSE BLD-MCNC: 107 MG/DL (ref 70–99)
HCT VFR BLD CALC: 30.5 % (ref 36–46)
HEMOGLOBIN: 9.5 G/DL (ref 12–16)
IMMATURE GRANULOCYTES: ABNORMAL %
LYMPHOCYTES # BLD: 29 % (ref 24–44)
MCH RBC QN AUTO: 25.2 PG (ref 26–34)
MCHC RBC AUTO-ENTMCNC: 31.3 G/DL (ref 31–37)
MCV RBC AUTO: 80.5 FL (ref 80–100)
MONOCYTES # BLD: 8 % (ref 2–11)
MORPHOLOGY: ABNORMAL
NRBC AUTOMATED: ABNORMAL PER 100 WBC
PDW BLD-RTO: 23 % (ref 12.5–15.4)
PLATELET # BLD: 988 K/UL (ref 140–450)
PLATELET ESTIMATE: ABNORMAL
PMV BLD AUTO: 6 FL (ref 6–12)
POTASSIUM SERPL-SCNC: 4.2 MMOL/L (ref 3.7–5.3)
RBC # BLD: 3.79 M/UL (ref 4–5.2)
RBC # BLD: ABNORMAL 10*6/UL
SEG NEUTROPHILS: 60 % (ref 36–66)
SEGMENTED NEUTROPHILS ABSOLUTE COUNT: 3.66 K/UL (ref 1.8–7.7)
SODIUM BLD-SCNC: 138 MMOL/L (ref 135–144)
TOTAL PROTEIN: 6.5 G/DL (ref 6.4–8.3)
WBC # BLD: 6.1 K/UL (ref 3.5–11)
WBC # BLD: ABNORMAL 10*3/UL

## 2021-08-13 PROCEDURE — 6360000002 HC RX W HCPCS: Performed by: INTERNAL MEDICINE

## 2021-08-13 PROCEDURE — 36591 DRAW BLOOD OFF VENOUS DEVICE: CPT

## 2021-08-13 PROCEDURE — 80053 COMPREHEN METABOLIC PANEL: CPT

## 2021-08-13 PROCEDURE — 96375 TX/PRO/DX INJ NEW DRUG ADDON: CPT

## 2021-08-13 PROCEDURE — 2580000003 HC RX 258: Performed by: INTERNAL MEDICINE

## 2021-08-13 PROCEDURE — 85025 COMPLETE CBC W/AUTO DIFF WBC: CPT

## 2021-08-13 PROCEDURE — 96413 CHEMO IV INFUSION 1 HR: CPT

## 2021-08-13 RX ORDER — HEPARIN SODIUM (PORCINE) LOCK FLUSH IV SOLN 100 UNIT/ML 100 UNIT/ML
500 SOLUTION INTRAVENOUS PRN
Status: DISCONTINUED | OUTPATIENT
Start: 2021-08-13 | End: 2021-08-14 | Stop reason: HOSPADM

## 2021-08-13 RX ORDER — SODIUM CHLORIDE 9 MG/ML
20 INJECTION, SOLUTION INTRAVENOUS ONCE
Status: COMPLETED | OUTPATIENT
Start: 2021-08-13 | End: 2021-08-13

## 2021-08-13 RX ORDER — DEXAMETHASONE SODIUM PHOSPHATE 4 MG/ML
8 INJECTION, SOLUTION INTRA-ARTICULAR; INTRALESIONAL; INTRAMUSCULAR; INTRAVENOUS; SOFT TISSUE ONCE
Status: COMPLETED | OUTPATIENT
Start: 2021-08-13 | End: 2021-08-13

## 2021-08-13 RX ORDER — SODIUM CHLORIDE 0.9 % (FLUSH) 0.9 %
10 SYRINGE (ML) INJECTION PRN
Status: DISCONTINUED | OUTPATIENT
Start: 2021-08-13 | End: 2021-08-14 | Stop reason: HOSPADM

## 2021-08-13 RX ADMIN — SODIUM CHLORIDE, PRESERVATIVE FREE 10 ML: 5 INJECTION INTRAVENOUS at 13:10

## 2021-08-13 RX ADMIN — GEMCITABINE 1800 MG: 38 INJECTION, SOLUTION INTRAVENOUS at 14:29

## 2021-08-13 RX ADMIN — DEXAMETHASONE SODIUM PHOSPHATE 8 MG: 4 INJECTION, SOLUTION INTRAMUSCULAR; INTRAVENOUS at 14:03

## 2021-08-13 RX ADMIN — SODIUM CHLORIDE 20 ML/HR: 9 INJECTION, SOLUTION INTRAVENOUS at 14:03

## 2021-08-13 RX ADMIN — SODIUM CHLORIDE, PRESERVATIVE FREE 10 ML: 5 INJECTION INTRAVENOUS at 15:09

## 2021-08-13 RX ADMIN — HEPARIN 500 UNITS: 100 SYRINGE at 15:09

## 2021-08-13 ASSESSMENT — PAIN DESCRIPTION - LOCATION: LOCATION: BACK

## 2021-08-13 ASSESSMENT — PAIN DESCRIPTION - ORIENTATION: ORIENTATION: LOWER

## 2021-08-13 ASSESSMENT — PAIN SCALES - GENERAL: PAINLEVEL_OUTOF10: 7

## 2021-08-13 ASSESSMENT — PAIN DESCRIPTION - PAIN TYPE: TYPE: CHRONIC PAIN

## 2021-08-13 NOTE — PROGRESS NOTES
Pt here for C7D8 Gemzar. Denies any new complaints. Labs drawn from port and results reviewed. Pt was treated without incident and d/c'd in stable condition. Pt will return on 8-27-21 for MD follow up and C8D1.

## 2021-08-25 DIAGNOSIS — M54.59 INTRACTABLE LOW BACK PAIN: ICD-10-CM

## 2021-08-25 DIAGNOSIS — C79.51 CANCER, METASTATIC TO BONE (HCC): ICD-10-CM

## 2021-08-26 RX ORDER — OXYCODONE HYDROCHLORIDE AND ACETAMINOPHEN 5; 325 MG/1; MG/1
TABLET ORAL
Qty: 180 TABLET | Refills: 0 | Status: SHIPPED | OUTPATIENT
Start: 2021-08-26 | End: 2021-09-23

## 2021-08-27 ENCOUNTER — HOSPITAL ENCOUNTER (OUTPATIENT)
Dept: INFUSION THERAPY | Age: 56
Discharge: HOME | End: 2021-08-27
Payer: COMMERCIAL

## 2021-08-27 ENCOUNTER — OFFICE VISIT (OUTPATIENT)
Dept: ONCOLOGY | Age: 56
End: 2021-08-27
Payer: COMMERCIAL

## 2021-08-27 ENCOUNTER — TELEPHONE (OUTPATIENT)
Dept: ONCOLOGY | Age: 56
End: 2021-08-27

## 2021-08-27 VITALS
HEART RATE: 120 BPM | WEIGHT: 159.7 LBS | SYSTOLIC BLOOD PRESSURE: 115 MMHG | BODY MASS INDEX: 26.58 KG/M2 | DIASTOLIC BLOOD PRESSURE: 71 MMHG | TEMPERATURE: 98.7 F

## 2021-08-27 DIAGNOSIS — C56.9 MALIGNANT NEOPLASM OF OVARY, UNSPECIFIED LATERALITY (HCC): ICD-10-CM

## 2021-08-27 DIAGNOSIS — C56.9 MALIGNANT NEOPLASM OF OVARY, UNSPECIFIED LATERALITY (HCC): Primary | ICD-10-CM

## 2021-08-27 LAB
ABSOLUTE EOS #: 0.46 K/UL (ref 0–0.4)
ABSOLUTE IMMATURE GRANULOCYTE: ABNORMAL K/UL (ref 0–0.3)
ABSOLUTE LYMPH #: 1.71 K/UL (ref 1–4.8)
ABSOLUTE MONO #: 1.25 K/UL (ref 0.1–1.2)
ALBUMIN SERPL-MCNC: 3.7 G/DL (ref 3.5–5.2)
ALBUMIN/GLOBULIN RATIO: 1.2 (ref 1–2.5)
ALP BLD-CCNC: 92 U/L (ref 35–104)
ALT SERPL-CCNC: 6 U/L (ref 5–33)
ANION GAP SERPL CALCULATED.3IONS-SCNC: 10 MMOL/L (ref 9–17)
AST SERPL-CCNC: 24 U/L
BASOPHILS # BLD: 1 % (ref 0–2)
BASOPHILS ABSOLUTE: 0.11 K/UL (ref 0–0.2)
BILIRUB SERPL-MCNC: <0.1 MG/DL (ref 0.3–1.2)
BUN BLDV-MCNC: 8 MG/DL (ref 6–20)
BUN/CREAT BLD: ABNORMAL (ref 9–20)
CALCIUM SERPL-MCNC: 8.8 MG/DL (ref 8.6–10.4)
CHLORIDE BLD-SCNC: 105 MMOL/L (ref 98–107)
CO2: 25 MMOL/L (ref 20–31)
CREAT SERPL-MCNC: 0.47 MG/DL (ref 0.5–0.9)
DIFFERENTIAL TYPE: ABNORMAL
EOSINOPHILS RELATIVE PERCENT: 4 % (ref 1–4)
GFR AFRICAN AMERICAN: >60 ML/MIN
GFR NON-AFRICAN AMERICAN: >60 ML/MIN
GFR SERPL CREATININE-BSD FRML MDRD: ABNORMAL ML/MIN/{1.73_M2}
GFR SERPL CREATININE-BSD FRML MDRD: ABNORMAL ML/MIN/{1.73_M2}
GLUCOSE BLD-MCNC: 103 MG/DL (ref 70–99)
HCT VFR BLD CALC: 32.3 % (ref 36–46)
HEMOGLOBIN: 9.8 G/DL (ref 12–16)
IMMATURE GRANULOCYTES: ABNORMAL %
LYMPHOCYTES # BLD: 15 % (ref 24–44)
MCH RBC QN AUTO: 24.4 PG (ref 26–34)
MCHC RBC AUTO-ENTMCNC: 30.5 G/DL (ref 31–37)
MCV RBC AUTO: 80.1 FL (ref 80–100)
MONOCYTES # BLD: 11 % (ref 2–11)
MORPHOLOGY: ABNORMAL
NRBC AUTOMATED: ABNORMAL PER 100 WBC
PDW BLD-RTO: 23.1 % (ref 12.5–15.4)
PLATELET # BLD: 936 K/UL (ref 140–450)
PLATELET ESTIMATE: ABNORMAL
PMV BLD AUTO: 6.9 FL (ref 6–12)
POTASSIUM SERPL-SCNC: 4.7 MMOL/L (ref 3.7–5.3)
RBC # BLD: 4.03 M/UL (ref 4–5.2)
RBC # BLD: ABNORMAL 10*6/UL
SEG NEUTROPHILS: 69 % (ref 36–66)
SEGMENTED NEUTROPHILS ABSOLUTE COUNT: 7.87 K/UL (ref 1.8–7.7)
SODIUM BLD-SCNC: 140 MMOL/L (ref 135–144)
TOTAL PROTEIN: 6.9 G/DL (ref 6.4–8.3)
WBC # BLD: 11.4 K/UL (ref 3.5–11)
WBC # BLD: ABNORMAL 10*3/UL

## 2021-08-27 PROCEDURE — 80053 COMPREHEN METABOLIC PANEL: CPT

## 2021-08-27 PROCEDURE — 99211 OFF/OP EST MAY X REQ PHY/QHP: CPT | Performed by: INTERNAL MEDICINE

## 2021-08-27 PROCEDURE — 86304 IMMUNOASSAY TUMOR CA 125: CPT

## 2021-08-27 PROCEDURE — G8427 DOCREV CUR MEDS BY ELIG CLIN: HCPCS | Performed by: INTERNAL MEDICINE

## 2021-08-27 PROCEDURE — 99214 OFFICE O/P EST MOD 30 MIN: CPT | Performed by: INTERNAL MEDICINE

## 2021-08-27 PROCEDURE — G8417 CALC BMI ABV UP PARAM F/U: HCPCS | Performed by: INTERNAL MEDICINE

## 2021-08-27 PROCEDURE — 85025 COMPLETE CBC W/AUTO DIFF WBC: CPT

## 2021-08-27 PROCEDURE — 36415 COLL VENOUS BLD VENIPUNCTURE: CPT

## 2021-08-27 PROCEDURE — 3017F COLORECTAL CA SCREEN DOC REV: CPT | Performed by: INTERNAL MEDICINE

## 2021-08-27 PROCEDURE — 4004F PT TOBACCO SCREEN RCVD TLK: CPT | Performed by: INTERNAL MEDICINE

## 2021-08-27 RX ORDER — DICYCLOMINE HCL 20 MG
20 TABLET ORAL 3 TIMES DAILY PRN
Qty: 40 TABLET | Refills: 1 | Status: ON HOLD
Start: 2021-08-27 | End: 2021-11-01 | Stop reason: HOSPADM

## 2021-08-27 NOTE — TELEPHONE ENCOUNTER
AVS from 8/27/21     Please add  to today's test  Hold chemo today  Possible chemo next week  RV will be determined.     Confirmed w/ phleb that ca125 can be added    tx held today, notified infusion & pharmacy    Schedule updated accordingly    Did not schedule RV as no information in regards to it is available at time of entry    PT was given AVS and an appt schedule    Electronically signed by Heavenly Oakley on 8/27/2021 at 1:43 PM

## 2021-09-02 DIAGNOSIS — M54.59 INTRACTABLE LOW BACK PAIN: ICD-10-CM

## 2021-09-02 DIAGNOSIS — C79.51 CANCER, METASTATIC TO BONE (HCC): ICD-10-CM

## 2021-09-02 RX ORDER — MORPHINE SULFATE 15 MG/1
TABLET, FILM COATED, EXTENDED RELEASE ORAL
Qty: 60 TABLET | Refills: 0 | Status: SHIPPED | OUTPATIENT
Start: 2021-09-02 | End: 2021-09-27

## 2021-09-02 RX ORDER — MORPHINE SULFATE 30 MG/1
TABLET, FILM COATED, EXTENDED RELEASE ORAL
Qty: 60 TABLET | Refills: 0 | Status: SHIPPED | OUTPATIENT
Start: 2021-09-02 | End: 2021-09-27

## 2021-09-03 ENCOUNTER — TELEPHONE (OUTPATIENT)
Dept: INFUSION THERAPY | Age: 56
End: 2021-09-03

## 2021-09-03 ENCOUNTER — HOSPITAL ENCOUNTER (OUTPATIENT)
Dept: INFUSION THERAPY | Age: 56
End: 2021-09-03
Payer: COMMERCIAL

## 2021-09-03 NOTE — TELEPHONE ENCOUNTER
Pt called requesting  level, no results in Epic   Clarified with Valorie Maldonado in lab 8/27  is 361   Pt called and notified   Wesley Sheppard RN

## 2021-09-03 NOTE — PROGRESS NOTES
at the present time. Patient denies smoking or alcohol drinking.l    INTERIM HISTORY:    patient is seen for follow up recurrent ovarian cancer. Patient received 1 cycle of palliative chemotherapy using Doxil/ Avastin. Patient was hospitalized at [facility] last week because of severe GI bleeding. She had multiple blood transfusions. She had GI and vascular evaluation. She had evidence of intra-abdominal mass at the splenic area with GI infiltration. Patient had embolization by vascular. Recently readmitted because of a seizure disorder. Images showed no brain mets. Currently seizures are controlled. Patient was hospitalized due to left leg DVT and anemia. She was treated accordingly. She received Eliquis. Tolerated well. Admitted last week with seizures. Treatment adjusted. No more seizures activity. Started on Gemzar. Tolerated well. No melena or hematochezia. No hematemesis. PAST MEDICAL HISTORY: has a past medical history of Anemia, Bleeding, Cervical cancer ([facility] [address] Depression, Diabetes mellitus ([facility] [address] GERD (gastroesophageal reflux disease), Hx of blood clots, Hypertension, Metastatic cancer ([facility] [address] Ovarian cancer ([facility] [address] Post chemo evaluation, and Splenic lesion. PAST SURGICAL HISTORY: has a past surgical history that includes Hysterectomy, total abdominal; Port Surgery; Tonsillectomy; IR PORT PLACEMENT > 5 YEARS (08/24/2020); Anus surgery; Abscess Drainage (2013); colectomy (03/2013); IR EMBOLIZATION HEMORRHAGE (10/05/2020); and Cardiac catheterization. CURRENT MEDICATIONS:  has a current medication list which includes the following prescription(s): dicyclomine, oxycodone-acetaminophen, lamotrigine, levetiracetam, ondansetron, eliquis, aspirin, morphine, morphine, sertraline, pantoprazole, and ferrous sulfate. ALLERGIES:  is allergic to ceftriaxone. FAMILY HISTORY: Negative for any hematological or oncological conditions.      SOCIAL HISTORY:  reports that she has been smoking cigarettes. She has been smoking about 1.00 pack per day. She uses smokeless tobacco. She reports previous alcohol use. She reports that she does not use drugs. REVIEW OF SYSTEMS:     · General: No weakness or fatigue. No unanticipated weight loss or decreased appetite. No fever or chills. · Eyes: No blurred vision, eye pain or double vision. · Ears: No hearing problems or drainage. No tinnitus. · Throat: No sore throat, problems with swallowing or dysphagia. · Respiratory: No cough, sputum or hemoptysis. No shortness of breath. No pleuritic chest pain. · Cardiovascular: No chest pain, orthopnea or PND. No lower extremity edema. No palpitation. · Gastrointestinal: As above. · Genitourinary: No dysuria, hematuria, frequency or urgency. · Musculoskeletal: No muscle aches or pains. No limitation of movement. No back pain. No gait disturbance, No joint complaints. · Dermatologic: No skin rashes or pruritus. No skin lesions or discolorations. · Psychiatric: No depression, anxiety, or stress or signs of schizophrenia. No change in mood or affect. · Hematologic: No history of bleeding tendency. No bruises or ecchymosis. No history of clotting problems. · Infectious disease: No fever, chills or frequent infections. · Endocrine: No polydipsia or polyuria. No temperature intolerance. · Neurologic: No headaches or dizziness. No weakness or numbness of the extremities. No changes in balance, coordination,  memory, mentation, behavior. · Allergic/Immunologic: No nasal congestion or hives. No repeated infections. PHYSICAL EXAM:  The patient is not in acute distress. Vital signs: Blood pressure 115/71, pulse 120, temperature 98.7 °F (37.1 °C), temperature source Oral, weight 159 lb 11.2 oz (72.4 kg).      General appearance - well appearing, not in pain or distress  Mental status - good mood, alert and oriented  Eyes - pupils equal and reactive, extraocular eye movements Avastin. She tolerated treatment fairly well. However due to recent episode of intra-abdominal bleeding we will discontinue Avastin. Next treatment will be with the use of Doxil. Patient had embolization by vascular. Reccent hospitalization for seizure. No brain mets. Seizures controlled. Hospitalized last week with left leg DVT and anemia. Hemoglobin is now stable. She received blood transfusion. No active bleeding. She is currently on Eliquis for DVT. Tolerated well with no side effects. We will give blood transfusion for symptomatic anemia. With patient's symptoms and upon her request we will hold Gemzar chemotherapy today. We will check the tumor marker today and if this is rising we will consider switching to different treatment. Otherwise treatment will be resumed next week. Reminded about possible side effects. She agreed. Monitor toxicity and response to treatment  Patient's questions were answered to the best of her satisfaction and she verbalized full understanding and agreement.

## 2021-09-10 ENCOUNTER — HOSPITAL ENCOUNTER (OUTPATIENT)
Dept: INFUSION THERAPY | Age: 56
Discharge: HOME | End: 2021-09-10
Payer: COMMERCIAL

## 2021-09-10 VITALS
SYSTOLIC BLOOD PRESSURE: 113 MMHG | WEIGHT: 160.6 LBS | BODY MASS INDEX: 26.73 KG/M2 | DIASTOLIC BLOOD PRESSURE: 67 MMHG | RESPIRATION RATE: 16 BRPM | TEMPERATURE: 98.1 F | HEART RATE: 93 BPM

## 2021-09-10 DIAGNOSIS — C56.9 MALIGNANT NEOPLASM OF OVARY, UNSPECIFIED LATERALITY (HCC): Primary | ICD-10-CM

## 2021-09-10 DIAGNOSIS — Z85.43 HISTORY OF OVARIAN CANCER: ICD-10-CM

## 2021-09-10 DIAGNOSIS — C79.51 CANCER, METASTATIC TO BONE (HCC): ICD-10-CM

## 2021-09-10 LAB
ABSOLUTE EOS #: 0.44 K/UL (ref 0–0.4)
ABSOLUTE IMMATURE GRANULOCYTE: ABNORMAL K/UL (ref 0–0.3)
ABSOLUTE LYMPH #: 2.48 K/UL (ref 1–4.8)
ABSOLUTE MONO #: 1.02 K/UL (ref 0.1–0.8)
ALBUMIN SERPL-MCNC: 3.5 G/DL (ref 3.5–5.2)
ALBUMIN/GLOBULIN RATIO: 1.1 (ref 1–2.5)
ALP BLD-CCNC: 74 U/L (ref 35–104)
ALT SERPL-CCNC: 7 U/L (ref 5–33)
ANION GAP SERPL CALCULATED.3IONS-SCNC: 11 MMOL/L (ref 9–17)
AST SERPL-CCNC: 12 U/L
BASOPHILS # BLD: 1 % (ref 0–2)
BASOPHILS ABSOLUTE: 0.15 K/UL (ref 0–0.2)
BILIRUB SERPL-MCNC: <0.1 MG/DL (ref 0.3–1.2)
BUN BLDV-MCNC: 7 MG/DL (ref 6–20)
BUN/CREAT BLD: ABNORMAL (ref 9–20)
CALCIUM SERPL-MCNC: 8.7 MG/DL (ref 8.6–10.4)
CHLORIDE BLD-SCNC: 103 MMOL/L (ref 98–107)
CO2: 25 MMOL/L (ref 20–31)
CREAT SERPL-MCNC: <0.4 MG/DL (ref 0.5–0.9)
DIFFERENTIAL TYPE: ABNORMAL
EOSINOPHILS RELATIVE PERCENT: 3 % (ref 1–4)
GFR AFRICAN AMERICAN: ABNORMAL ML/MIN
GFR NON-AFRICAN AMERICAN: ABNORMAL ML/MIN
GFR SERPL CREATININE-BSD FRML MDRD: ABNORMAL ML/MIN/{1.73_M2}
GFR SERPL CREATININE-BSD FRML MDRD: ABNORMAL ML/MIN/{1.73_M2}
GLUCOSE BLD-MCNC: 118 MG/DL (ref 70–99)
HCT VFR BLD CALC: 31 % (ref 36–46)
HEMOGLOBIN: 8.8 G/DL (ref 12–16)
IMMATURE GRANULOCYTES: ABNORMAL %
LYMPHOCYTES # BLD: 17 % (ref 24–44)
MCH RBC QN AUTO: 22.2 PG (ref 26–34)
MCHC RBC AUTO-ENTMCNC: 28.4 G/DL (ref 31–37)
MCV RBC AUTO: 78.3 FL (ref 80–100)
MONOCYTES # BLD: 7 % (ref 1–7)
MORPHOLOGY: ABNORMAL
NRBC AUTOMATED: ABNORMAL PER 100 WBC
PDW BLD-RTO: 23.2 % (ref 12.5–15.4)
PLATELET # BLD: 966 K/UL (ref 140–450)
PLATELET ESTIMATE: ABNORMAL
PMV BLD AUTO: 8.3 FL (ref 8–14)
POTASSIUM SERPL-SCNC: 4.1 MMOL/L (ref 3.7–5.3)
RBC # BLD: 3.96 M/UL (ref 4–5.2)
RBC # BLD: ABNORMAL 10*6/UL
SEG NEUTROPHILS: 72 % (ref 36–66)
SEGMENTED NEUTROPHILS ABSOLUTE COUNT: 10.51 K/UL (ref 1.8–7.7)
SODIUM BLD-SCNC: 139 MMOL/L (ref 135–144)
TOTAL PROTEIN: 6.6 G/DL (ref 6.4–8.3)
WBC # BLD: 14.6 K/UL (ref 3.5–11)
WBC # BLD: ABNORMAL 10*3/UL

## 2021-09-10 PROCEDURE — 96413 CHEMO IV INFUSION 1 HR: CPT

## 2021-09-10 PROCEDURE — 85025 COMPLETE CBC W/AUTO DIFF WBC: CPT

## 2021-09-10 PROCEDURE — 36591 DRAW BLOOD OFF VENOUS DEVICE: CPT

## 2021-09-10 PROCEDURE — 6360000002 HC RX W HCPCS: Performed by: INTERNAL MEDICINE

## 2021-09-10 PROCEDURE — 2580000003 HC RX 258: Performed by: INTERNAL MEDICINE

## 2021-09-10 PROCEDURE — 80053 COMPREHEN METABOLIC PANEL: CPT

## 2021-09-10 PROCEDURE — 96375 TX/PRO/DX INJ NEW DRUG ADDON: CPT

## 2021-09-10 RX ORDER — DEXAMETHASONE SODIUM PHOSPHATE 4 MG/ML
8 INJECTION, SOLUTION INTRA-ARTICULAR; INTRALESIONAL; INTRAMUSCULAR; INTRAVENOUS; SOFT TISSUE ONCE
Status: COMPLETED | OUTPATIENT
Start: 2021-09-10 | End: 2021-09-10

## 2021-09-10 RX ORDER — METHYLPREDNISOLONE SODIUM SUCCINATE 125 MG/2ML
125 INJECTION, POWDER, LYOPHILIZED, FOR SOLUTION INTRAMUSCULAR; INTRAVENOUS ONCE
Status: CANCELLED | OUTPATIENT
Start: 2021-11-05 | End: 2021-10-08

## 2021-09-10 RX ORDER — METHYLPREDNISOLONE SODIUM SUCCINATE 125 MG/2ML
125 INJECTION, POWDER, LYOPHILIZED, FOR SOLUTION INTRAMUSCULAR; INTRAVENOUS ONCE
Status: CANCELLED | OUTPATIENT
Start: 2021-10-08 | End: 2021-10-01

## 2021-09-10 RX ORDER — DEXAMETHASONE SODIUM PHOSPHATE 10 MG/ML
8 INJECTION INTRAMUSCULAR; INTRAVENOUS ONCE
Status: CANCELLED | OUTPATIENT
Start: 2021-11-05

## 2021-09-10 RX ORDER — SODIUM CHLORIDE 9 MG/ML
20 INJECTION, SOLUTION INTRAVENOUS ONCE
Status: CANCELLED | OUTPATIENT
Start: 2021-10-08 | End: 2021-10-01

## 2021-09-10 RX ORDER — SODIUM CHLORIDE 0.9 % (FLUSH) 0.9 %
10 SYRINGE (ML) INJECTION PRN
Status: CANCELLED | OUTPATIENT
Start: 2021-11-05

## 2021-09-10 RX ORDER — HEPARIN SODIUM (PORCINE) LOCK FLUSH IV SOLN 100 UNIT/ML 100 UNIT/ML
500 SOLUTION INTRAVENOUS PRN
Status: CANCELLED | OUTPATIENT
Start: 2021-10-08

## 2021-09-10 RX ORDER — EPINEPHRINE 1 MG/ML
0.3 INJECTION, SOLUTION, CONCENTRATE INTRAVENOUS PRN
Status: CANCELLED | OUTPATIENT
Start: 2021-11-05

## 2021-09-10 RX ORDER — SODIUM CHLORIDE 9 MG/ML
20 INJECTION, SOLUTION INTRAVENOUS ONCE
Status: COMPLETED | OUTPATIENT
Start: 2021-09-10 | End: 2021-09-10

## 2021-09-10 RX ORDER — SODIUM CHLORIDE 0.9 % (FLUSH) 0.9 %
10 SYRINGE (ML) INJECTION PRN
Status: DISCONTINUED | OUTPATIENT
Start: 2021-09-10 | End: 2021-09-11 | Stop reason: HOSPADM

## 2021-09-10 RX ORDER — SODIUM CHLORIDE 0.9 % (FLUSH) 0.9 %
10 SYRINGE (ML) INJECTION PRN
Status: CANCELLED | OUTPATIENT
Start: 2021-10-08

## 2021-09-10 RX ORDER — EPINEPHRINE 1 MG/ML
0.3 INJECTION, SOLUTION, CONCENTRATE INTRAVENOUS PRN
Status: CANCELLED | OUTPATIENT
Start: 2021-10-08

## 2021-09-10 RX ORDER — HEPARIN SODIUM (PORCINE) LOCK FLUSH IV SOLN 100 UNIT/ML 100 UNIT/ML
500 SOLUTION INTRAVENOUS PRN
Status: CANCELLED | OUTPATIENT
Start: 2021-11-05

## 2021-09-10 RX ORDER — DIPHENHYDRAMINE HYDROCHLORIDE 50 MG/ML
50 INJECTION INTRAMUSCULAR; INTRAVENOUS ONCE
Status: CANCELLED | OUTPATIENT
Start: 2021-10-08 | End: 2021-10-01

## 2021-09-10 RX ORDER — SODIUM CHLORIDE 9 MG/ML
INJECTION, SOLUTION INTRAVENOUS CONTINUOUS
Status: CANCELLED | OUTPATIENT
Start: 2021-11-05

## 2021-09-10 RX ORDER — SODIUM CHLORIDE 9 MG/ML
20 INJECTION, SOLUTION INTRAVENOUS ONCE
Status: CANCELLED | OUTPATIENT
Start: 2021-11-05 | End: 2021-10-08

## 2021-09-10 RX ORDER — DIPHENHYDRAMINE HYDROCHLORIDE 50 MG/ML
50 INJECTION INTRAMUSCULAR; INTRAVENOUS ONCE
Status: CANCELLED | OUTPATIENT
Start: 2021-11-05 | End: 2021-10-08

## 2021-09-10 RX ORDER — HEPARIN SODIUM (PORCINE) LOCK FLUSH IV SOLN 100 UNIT/ML 100 UNIT/ML
500 SOLUTION INTRAVENOUS PRN
Status: DISCONTINUED | OUTPATIENT
Start: 2021-09-10 | End: 2021-09-11 | Stop reason: HOSPADM

## 2021-09-10 RX ORDER — SODIUM CHLORIDE 0.9 % (FLUSH) 0.9 %
5 SYRINGE (ML) INJECTION PRN
Status: CANCELLED | OUTPATIENT
Start: 2021-10-08

## 2021-09-10 RX ORDER — DEXAMETHASONE SODIUM PHOSPHATE 10 MG/ML
8 INJECTION INTRAMUSCULAR; INTRAVENOUS ONCE
Status: CANCELLED | OUTPATIENT
Start: 2021-10-08

## 2021-09-10 RX ORDER — SODIUM CHLORIDE 0.9 % (FLUSH) 0.9 %
5 SYRINGE (ML) INJECTION PRN
Status: CANCELLED | OUTPATIENT
Start: 2021-11-05

## 2021-09-10 RX ORDER — SODIUM CHLORIDE 9 MG/ML
INJECTION, SOLUTION INTRAVENOUS CONTINUOUS
Status: CANCELLED | OUTPATIENT
Start: 2021-10-08

## 2021-09-10 RX ADMIN — SODIUM CHLORIDE, PRESERVATIVE FREE 10 ML: 5 INJECTION INTRAVENOUS at 13:01

## 2021-09-10 RX ADMIN — SODIUM CHLORIDE 20 ML/HR: 9 INJECTION, SOLUTION INTRAVENOUS at 13:56

## 2021-09-10 RX ADMIN — SODIUM CHLORIDE, PRESERVATIVE FREE 10 ML: 5 INJECTION INTRAVENOUS at 13:00

## 2021-09-10 RX ADMIN — HEPARIN 500 UNITS: 100 SYRINGE at 15:08

## 2021-09-10 RX ADMIN — DEXAMETHASONE SODIUM PHOSPHATE 8 MG: 4 INJECTION, SOLUTION INTRAMUSCULAR; INTRAVENOUS at 13:58

## 2021-09-10 RX ADMIN — GEMCITABINE 1800 MG: 38 INJECTION, SOLUTION INTRAVENOUS at 14:27

## 2021-09-10 RX ADMIN — SODIUM CHLORIDE, PRESERVATIVE FREE 10 ML: 5 INJECTION INTRAVENOUS at 15:08

## 2021-09-13 DIAGNOSIS — C79.60 MALIGNANT NEOPLASM METASTATIC TO OVARY, UNSPECIFIED LATERALITY (HCC): Primary | ICD-10-CM

## 2021-09-13 RX ORDER — AZITHROMYCIN 1 G
1 PACKET (EA) ORAL ONCE
Qty: 1 EACH | Refills: 0 | Status: CANCELLED | OUTPATIENT
Start: 2021-09-13 | End: 2021-09-13

## 2021-09-13 RX ORDER — AZITHROMYCIN 250 MG/1
250 TABLET, FILM COATED ORAL SEE ADMIN INSTRUCTIONS
Qty: 6 TABLET | Refills: 0 | Status: SHIPPED | OUTPATIENT
Start: 2021-09-13 | End: 2021-10-14 | Stop reason: SDUPTHER

## 2021-09-17 ENCOUNTER — OFFICE VISIT (OUTPATIENT)
Dept: ONCOLOGY | Age: 56
End: 2021-09-17
Payer: COMMERCIAL

## 2021-09-17 ENCOUNTER — TELEPHONE (OUTPATIENT)
Dept: ONCOLOGY | Age: 56
End: 2021-09-17

## 2021-09-17 ENCOUNTER — HOSPITAL ENCOUNTER (OUTPATIENT)
Dept: INFUSION THERAPY | Age: 56
Discharge: HOME | End: 2021-09-17
Payer: COMMERCIAL

## 2021-09-17 VITALS
DIASTOLIC BLOOD PRESSURE: 77 MMHG | HEART RATE: 99 BPM | SYSTOLIC BLOOD PRESSURE: 136 MMHG | BODY MASS INDEX: 26.04 KG/M2 | RESPIRATION RATE: 18 BRPM | TEMPERATURE: 98 F | WEIGHT: 156.5 LBS

## 2021-09-17 DIAGNOSIS — Z85.43 HISTORY OF OVARIAN CANCER: ICD-10-CM

## 2021-09-17 DIAGNOSIS — C79.51 CANCER, METASTATIC TO BONE (HCC): ICD-10-CM

## 2021-09-17 DIAGNOSIS — C56.9 MALIGNANT NEOPLASM OF OVARY, UNSPECIFIED LATERALITY (HCC): Primary | ICD-10-CM

## 2021-09-17 LAB
ABSOLUTE EOS #: 0.18 K/UL (ref 0–0.4)
ABSOLUTE IMMATURE GRANULOCYTE: ABNORMAL K/UL (ref 0–0.3)
ABSOLUTE LYMPH #: 2.07 K/UL (ref 1–4.8)
ABSOLUTE MONO #: 0.41 K/UL (ref 0.1–1.2)
ALBUMIN SERPL-MCNC: 3.9 G/DL (ref 3.5–5.2)
ALBUMIN/GLOBULIN RATIO: 1.2 (ref 1–2.5)
ALP BLD-CCNC: 70 U/L (ref 35–104)
ALT SERPL-CCNC: 7 U/L (ref 5–33)
ANION GAP SERPL CALCULATED.3IONS-SCNC: 11 MMOL/L (ref 9–17)
AST SERPL-CCNC: 11 U/L
BASOPHILS # BLD: 2 % (ref 0–2)
BASOPHILS ABSOLUTE: 0.12 K/UL (ref 0–0.2)
BILIRUB SERPL-MCNC: 0.16 MG/DL (ref 0.3–1.2)
BUN BLDV-MCNC: 7 MG/DL (ref 6–20)
BUN/CREAT BLD: ABNORMAL (ref 9–20)
CA 125: 369 U/ML
CALCIUM SERPL-MCNC: 9.1 MG/DL (ref 8.6–10.4)
CHLORIDE BLD-SCNC: 101 MMOL/L (ref 98–107)
CO2: 24 MMOL/L (ref 20–31)
CREAT SERPL-MCNC: <0.4 MG/DL (ref 0.5–0.9)
DIFFERENTIAL TYPE: ABNORMAL
EOSINOPHILS RELATIVE PERCENT: 3 % (ref 1–4)
GFR AFRICAN AMERICAN: ABNORMAL ML/MIN
GFR NON-AFRICAN AMERICAN: ABNORMAL ML/MIN
GFR SERPL CREATININE-BSD FRML MDRD: ABNORMAL ML/MIN/{1.73_M2}
GFR SERPL CREATININE-BSD FRML MDRD: ABNORMAL ML/MIN/{1.73_M2}
GLUCOSE BLD-MCNC: 123 MG/DL (ref 70–99)
HCT VFR BLD CALC: 29.4 % (ref 36–46)
HEMOGLOBIN: 8.8 G/DL (ref 12–16)
IMMATURE GRANULOCYTES: ABNORMAL %
LYMPHOCYTES # BLD: 35 % (ref 24–44)
MCH RBC QN AUTO: 22.4 PG (ref 26–34)
MCHC RBC AUTO-ENTMCNC: 30.1 G/DL (ref 31–37)
MCV RBC AUTO: 74.6 FL (ref 80–100)
MONOCYTES # BLD: 7 % (ref 2–11)
MORPHOLOGY: ABNORMAL
NRBC AUTOMATED: ABNORMAL PER 100 WBC
PDW BLD-RTO: 23.2 % (ref 12.5–15.4)
PLATELET # BLD: 559 K/UL (ref 140–450)
PLATELET ESTIMATE: ABNORMAL
PMV BLD AUTO: 6.5 FL (ref 6–12)
POTASSIUM SERPL-SCNC: 3.9 MMOL/L (ref 3.7–5.3)
PROTEIN UA: NEGATIVE
RBC # BLD: 3.94 M/UL (ref 4–5.2)
RBC # BLD: ABNORMAL 10*6/UL
SEG NEUTROPHILS: 53 % (ref 36–66)
SEGMENTED NEUTROPHILS ABSOLUTE COUNT: 3.12 K/UL (ref 1.8–7.7)
SODIUM BLD-SCNC: 136 MMOL/L (ref 135–144)
TOTAL PROTEIN: 7.2 G/DL (ref 6.4–8.3)
WBC # BLD: 5.9 K/UL (ref 3.5–11)
WBC # BLD: ABNORMAL 10*3/UL

## 2021-09-17 PROCEDURE — 96413 CHEMO IV INFUSION 1 HR: CPT

## 2021-09-17 PROCEDURE — 3017F COLORECTAL CA SCREEN DOC REV: CPT | Performed by: INTERNAL MEDICINE

## 2021-09-17 PROCEDURE — 6360000002 HC RX W HCPCS: Performed by: INTERNAL MEDICINE

## 2021-09-17 PROCEDURE — 80053 COMPREHEN METABOLIC PANEL: CPT

## 2021-09-17 PROCEDURE — 81003 URINALYSIS AUTO W/O SCOPE: CPT

## 2021-09-17 PROCEDURE — 99211 OFF/OP EST MAY X REQ PHY/QHP: CPT

## 2021-09-17 PROCEDURE — G8427 DOCREV CUR MEDS BY ELIG CLIN: HCPCS | Performed by: INTERNAL MEDICINE

## 2021-09-17 PROCEDURE — 99214 OFFICE O/P EST MOD 30 MIN: CPT | Performed by: INTERNAL MEDICINE

## 2021-09-17 PROCEDURE — 86304 IMMUNOASSAY TUMOR CA 125: CPT

## 2021-09-17 PROCEDURE — 85025 COMPLETE CBC W/AUTO DIFF WBC: CPT

## 2021-09-17 PROCEDURE — 36591 DRAW BLOOD OFF VENOUS DEVICE: CPT

## 2021-09-17 PROCEDURE — G8417 CALC BMI ABV UP PARAM F/U: HCPCS | Performed by: INTERNAL MEDICINE

## 2021-09-17 PROCEDURE — 2580000003 HC RX 258: Performed by: INTERNAL MEDICINE

## 2021-09-17 PROCEDURE — 96375 TX/PRO/DX INJ NEW DRUG ADDON: CPT

## 2021-09-17 PROCEDURE — 4004F PT TOBACCO SCREEN RCVD TLK: CPT | Performed by: INTERNAL MEDICINE

## 2021-09-17 RX ORDER — HEPARIN SODIUM (PORCINE) LOCK FLUSH IV SOLN 100 UNIT/ML 100 UNIT/ML
500 SOLUTION INTRAVENOUS PRN
Status: CANCELLED | OUTPATIENT
Start: 2021-11-12

## 2021-09-17 RX ORDER — METHYLPREDNISOLONE SODIUM SUCCINATE 125 MG/2ML
125 INJECTION, POWDER, LYOPHILIZED, FOR SOLUTION INTRAMUSCULAR; INTRAVENOUS ONCE
Status: CANCELLED | OUTPATIENT
Start: 2021-11-19 | End: 2021-10-29

## 2021-09-17 RX ORDER — SODIUM CHLORIDE 9 MG/ML
INJECTION, SOLUTION INTRAVENOUS CONTINUOUS
Status: CANCELLED | OUTPATIENT
Start: 2021-11-19

## 2021-09-17 RX ORDER — SODIUM CHLORIDE 0.9 % (FLUSH) 0.9 %
5 SYRINGE (ML) INJECTION PRN
Status: CANCELLED | OUTPATIENT
Start: 2021-11-12

## 2021-09-17 RX ORDER — EPINEPHRINE 1 MG/ML
0.3 INJECTION, SOLUTION, CONCENTRATE INTRAVENOUS PRN
Status: CANCELLED | OUTPATIENT
Start: 2021-11-19

## 2021-09-17 RX ORDER — SODIUM CHLORIDE 0.9 % (FLUSH) 0.9 %
10 SYRINGE (ML) INJECTION PRN
Status: DISCONTINUED | OUTPATIENT
Start: 2021-09-17 | End: 2021-09-18 | Stop reason: HOSPADM

## 2021-09-17 RX ORDER — DEXAMETHASONE SODIUM PHOSPHATE 4 MG/ML
8 INJECTION, SOLUTION INTRA-ARTICULAR; INTRALESIONAL; INTRAMUSCULAR; INTRAVENOUS; SOFT TISSUE ONCE
Status: COMPLETED | OUTPATIENT
Start: 2021-09-17 | End: 2021-09-17

## 2021-09-17 RX ORDER — DIPHENHYDRAMINE HYDROCHLORIDE 50 MG/ML
50 INJECTION INTRAMUSCULAR; INTRAVENOUS ONCE
Status: CANCELLED | OUTPATIENT
Start: 2021-11-12 | End: 2021-10-22

## 2021-09-17 RX ORDER — METHYLPREDNISOLONE SODIUM SUCCINATE 125 MG/2ML
125 INJECTION, POWDER, LYOPHILIZED, FOR SOLUTION INTRAMUSCULAR; INTRAVENOUS ONCE
Status: CANCELLED | OUTPATIENT
Start: 2021-11-12 | End: 2021-10-22

## 2021-09-17 RX ORDER — SODIUM CHLORIDE 0.9 % (FLUSH) 0.9 %
5 SYRINGE (ML) INJECTION PRN
Status: CANCELLED | OUTPATIENT
Start: 2021-11-19

## 2021-09-17 RX ORDER — SODIUM CHLORIDE 9 MG/ML
20 INJECTION, SOLUTION INTRAVENOUS ONCE
Status: CANCELLED | OUTPATIENT
Start: 2021-11-12 | End: 2021-10-22

## 2021-09-17 RX ORDER — HEPARIN SODIUM (PORCINE) LOCK FLUSH IV SOLN 100 UNIT/ML 100 UNIT/ML
500 SOLUTION INTRAVENOUS PRN
Status: DISCONTINUED | OUTPATIENT
Start: 2021-09-17 | End: 2021-09-18 | Stop reason: HOSPADM

## 2021-09-17 RX ORDER — SODIUM CHLORIDE 9 MG/ML
INJECTION, SOLUTION INTRAVENOUS CONTINUOUS
Status: CANCELLED | OUTPATIENT
Start: 2021-11-12

## 2021-09-17 RX ORDER — SODIUM CHLORIDE 9 MG/ML
20 INJECTION, SOLUTION INTRAVENOUS ONCE
Status: COMPLETED | OUTPATIENT
Start: 2021-09-17 | End: 2021-09-17

## 2021-09-17 RX ORDER — SODIUM CHLORIDE 0.9 % (FLUSH) 0.9 %
10 SYRINGE (ML) INJECTION PRN
Status: CANCELLED | OUTPATIENT
Start: 2021-11-12

## 2021-09-17 RX ORDER — SODIUM CHLORIDE 0.9 % (FLUSH) 0.9 %
10 SYRINGE (ML) INJECTION PRN
Status: CANCELLED | OUTPATIENT
Start: 2021-11-19

## 2021-09-17 RX ORDER — SODIUM CHLORIDE 9 MG/ML
20 INJECTION, SOLUTION INTRAVENOUS ONCE
Status: CANCELLED | OUTPATIENT
Start: 2021-11-19 | End: 2021-10-29

## 2021-09-17 RX ORDER — DIPHENHYDRAMINE HYDROCHLORIDE 50 MG/ML
50 INJECTION INTRAMUSCULAR; INTRAVENOUS ONCE
Status: CANCELLED | OUTPATIENT
Start: 2021-11-19 | End: 2021-10-29

## 2021-09-17 RX ORDER — HEPARIN SODIUM (PORCINE) LOCK FLUSH IV SOLN 100 UNIT/ML 100 UNIT/ML
500 SOLUTION INTRAVENOUS PRN
Status: CANCELLED | OUTPATIENT
Start: 2021-11-19

## 2021-09-17 RX ORDER — EPINEPHRINE 1 MG/ML
0.3 INJECTION, SOLUTION, CONCENTRATE INTRAVENOUS PRN
Status: CANCELLED | OUTPATIENT
Start: 2021-11-12

## 2021-09-17 RX ADMIN — SODIUM CHLORIDE 20 ML/HR: 9 INJECTION, SOLUTION INTRAVENOUS at 09:47

## 2021-09-17 RX ADMIN — SODIUM CHLORIDE, PRESERVATIVE FREE 10 ML: 5 INJECTION INTRAVENOUS at 08:50

## 2021-09-17 RX ADMIN — HEPARIN 500 UNITS: 100 SYRINGE at 11:12

## 2021-09-17 RX ADMIN — DEXAMETHASONE SODIUM PHOSPHATE 8 MG: 4 INJECTION, SOLUTION INTRAMUSCULAR; INTRAVENOUS at 09:50

## 2021-09-17 RX ADMIN — SODIUM CHLORIDE, PRESERVATIVE FREE 10 ML: 5 INJECTION INTRAVENOUS at 09:47

## 2021-09-17 RX ADMIN — GEMCITABINE 1800 MG: 38 INJECTION, SOLUTION INTRAVENOUS at 10:34

## 2021-09-17 RX ADMIN — SODIUM CHLORIDE, PRESERVATIVE FREE 10 ML: 5 INJECTION INTRAVENOUS at 11:12

## 2021-09-17 NOTE — PATIENT INSTRUCTIONS
PLEASE ADD  TO TODAY'S TESTS  PLEASE DO THE TEST   at the start of every cycle  CT abdomen and pelvis before RV  RV 2-3 weeks

## 2021-09-17 NOTE — PROGRESS NOTES
Rohit Call here for md visit and tx   Port accessed per karis nurse  Good blood return, labs reviewed   Continue treatment per md   Pre meds per orders see MAR  IV Gemzar over 30min as ordered see MAR   Pt tolerated well, port heparinized   Pt discharged to home

## 2021-09-17 NOTE — TELEPHONE ENCOUNTER
Name: Robyn Jonas [facility]  : 1965  MRN: [id number]    Oncology Navigation Follow-Up Note    Contact Type:  Medical Oncology  Notes: Pt @ [facility] for Dr. Pop Macedo f/u. Met with pt & pt's daughter prior to f/u. Pt noted tearful upon entering exam room. Pt stated son recently received bad news on recent biopsy, support given. Pt c/o insomnia, encouraged pt to discuss with Dr. Pop Macedo. Inquired on [facility] gas gift cards, pt stated has not been receiving. [facility] FAA completed with pt. Attempted to contact Rayna [facility], no answer, VM left updated on referral.  Instructed pt may contact writer prn. Will continue to follow.     Electronically signed by Kaila Douglass RN on 2021 at 9:21 AM

## 2021-09-17 NOTE — TELEPHONE ENCOUNTER
AVS from 9/17/21     PLEASE ADD  TO TODAY'S TESTS  PLEASE DO THE TEST   at the start of every cycle  CT abdomen and pelvis before RV  RV 2-3 weeks     Contacted main lab ([facility]) to add ca125 to labs and added to D1 of each cycle    RV scheduled 10/1/21 @ 9am    CT scheduled 9/24/21 @ 12:30pm    PT was given AVS and an appt schedule    Electronically signed by Yesenia Jackson on 9/17/2021 at 12:06 PM

## 2021-09-17 NOTE — PROGRESS NOTES
_      Chief Complaint   Patient presents with    Follow-up    Discuss Labs     DIAGNOSIS:       Recurrent ovarian cancer. Original diagnosis 2005 with multiple recurrences. Diffuse metastasis. CURRENT THERAPY:         Doxil/ Avastin started 9/18/2020. Avastin was discontinued due to recent GI bleeding. Status post recent vascular embolization  S/p seizure disorder. Gemzar started 2/26/2021. Interrupted due to recent hospitalization    BRIEF CASE HISTORY:      Ms. Duncan Adam is a very pleasant 60 y.o. female with history of multiple co morbidities as listed. The patient seen in consultation for ovarian cancer with multiple recurrences. 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 She had surgical debulking and she had systemic treatment with Taxol and carboplatin for 6 cycles. Patient was in remission for about 2 years. She had a relapse in 2007 and treated with topotecan with good results. Patient relapsed again in 2008 and was treated with Taxol carboplatin. After 3 cycles of systemic chemotherapy she had problems with carboplatin so she finished 3 more cycles with Taxol. She did well again for 2 to 3 years until she had a relapse in 2012 and she had intraperitoneal chemotherapy treatment with Taxol. Patient was last seen by her oncologist in 2014 at which time she had relatively stable disease with normal tumor marker and no significant abnormal images. Patient moved to [place] after that and she was lost for oncology follow-ups. Patient was recently evaluated again here in [place] because of abdominal discomfort. Re imaging confirmed metastatic relapse. Biopsy confirmed ovarian cancer recurrence. Scan showed extensive intraabdominal and splenic involvement and lung mets. She has no symptoms at the present time.    Patient denies smoking or alcohol drinking.l    INTERIM HISTORY:    patient is seen for follow up recurrent ovarian cancer. Patient received 1 cycle of palliative chemotherapy using Doxil/ Avastin. Patient was hospitalized at [facility] last week because of severe GI bleeding. She had multiple blood transfusions. She had GI and vascular evaluation. She had evidence of intra-abdominal mass at the splenic area with GI infiltration. Patient had embolization by vascular. Recently readmitted because of a seizure disorder. Images showed no brain mets. Currently seizures are controlled. Patient was hospitalized due to left leg DVT and anemia. She was treated accordingly. She received Eliquis. Tolerated well. Admitted last week with seizures. Treatment adjusted. No more seizures activity. Started on Gemzar. Tolerated well. No melena or hematochezia. No hematemesis. PAST MEDICAL HISTORY: has a past medical history of Anemia, Bleeding, Cervical cancer ([facility] [address] Depression, Diabetes mellitus ([facility] [address] GERD (gastroesophageal reflux disease), Hx of blood clots, Hypertension, Metastatic cancer ([place] [address] Ovarian cancer ([facility] [address] Post chemo evaluation, and Splenic lesion. PAST SURGICAL HISTORY: has a past surgical history that includes Hysterectomy, total abdominal; Port Surgery; Tonsillectomy; IR PORT PLACEMENT > 5 YEARS (08/24/2020); Anus surgery; Abscess Drainage (2013); colectomy (03/2013); IR EMBOLIZATION HEMORRHAGE (10/05/2020); and Cardiac catheterization.      CURRENT MEDICATIONS:  has a current medication list which includes the following prescription(s): azithromycin, morphine, morphine, oxycodone-acetaminophen, lamotrigine, levetiracetam, ondansetron, sertraline, eliquis, aspirin, dicyclomine, pantoprazole, and ferrous sulfate, and the following Facility-Administered Medications: sodium chloride, gemcitabine HCl (GEMZAR) 1,800 mg in sodium chloride 0.9 % 250 mL chemo IVPB, heparin flush, and sodium chloride flush.    ALLERGIES:  is allergic to ceftriaxone. FAMILY HISTORY: Negative for any hematological or oncological conditions. SOCIAL HISTORY:  reports that she has been smoking cigarettes. She has been smoking about 1.00 pack per day. She uses smokeless tobacco. She reports previous alcohol use. She reports that she does not use drugs. REVIEW OF SYSTEMS:     · General: No weakness or fatigue. No unanticipated weight loss or decreased appetite. No fever or chills. · Eyes: No blurred vision, eye pain or double vision. · Ears: No hearing problems or drainage. No tinnitus. · Throat: No sore throat, problems with swallowing or dysphagia. · Respiratory: No cough, sputum or hemoptysis. No shortness of breath. No pleuritic chest pain. · Cardiovascular: No chest pain, orthopnea or PND. No lower extremity edema. No palpitation. · Gastrointestinal: As above. · Genitourinary: No dysuria, hematuria, frequency or urgency. · Musculoskeletal: No muscle aches or pains. No limitation of movement. No back pain. No gait disturbance, No joint complaints. · Dermatologic: No skin rashes or pruritus. No skin lesions or discolorations. · Psychiatric: No depression, anxiety, or stress or signs of schizophrenia. No change in mood or affect. · Hematologic: No history of bleeding tendency. No bruises or ecchymosis. No history of clotting problems. · Infectious disease: No fever, chills or frequent infections. · Endocrine: No polydipsia or polyuria. No temperature intolerance. · Neurologic: No headaches or dizziness. No weakness or numbness of the extremities. No changes in balance, coordination,  memory, mentation, behavior. · Allergic/Immunologic: No nasal congestion or hives. No repeated infections. PHYSICAL EXAM:  The patient is not in acute distress. Vital signs: Blood pressure 136/77, pulse 99, temperature 98 °F (36.7 °C), temperature source Oral, resp. rate 18, weight 156 lb 8 oz (71 kg). General appearance - well appearing, not in pain or distress  Mental status - good mood, alert and oriented  Eyes - pupils equal and reactive, extraocular eye movements intact  Ears - bilateral TM's and external ear canals normal  Nose - normal and patent, no erythema, discharge or polyps  Mouth - mucous membranes moist, pharynx normal without lesions  Neck - supple, no significant adenopathy  Lymphatics - no palpable lymphadenopathy, no hepatosplenomegaly  Chest - clear to auscultation, no wheezes, rales or rhonchi, symmetric air entry  Heart - normal rate, regular rhythm, normal S1, S2, no murmurs, rubs, clicks or gallops  Abdomen - soft, nontender, nondistended, no masses or organomegaly  Neurological - alert, oriented, normal speech, no focal findings or movement disorder noted  Musculoskeletal - no joint tenderness, deformity or swelling  Extremities - peripheral pulses normal, no pedal edema, no clubbing or cyanosis  Skin - normal coloration and turgor, no rashes, no suspicious skin lesions noted     Review of Diagnostic data:   Lab Results   Component Value Date    WBC 5.9 09/17/2021    HGB 8.8 (L) 09/17/2021    HCT 29.4 (L) 09/17/2021    MCV 74.6 (L) 09/17/2021     (H) 09/17/2021       Chemistry        Component Value Date/Time     09/17/2021 0850    K 3.9 09/17/2021 0850     09/17/2021 0850    CO2 24 09/17/2021 0850    BUN 7 09/17/2021 0850    CREATININE <0.40 (L) 09/17/2021 0850        Component Value Date/Time    CALCIUM 9.1 09/17/2021 0850    ALKPHOS 70 09/17/2021 0850    AST 11 09/17/2021 0850    ALT 7 09/17/2021 0850    BILITOT 0.16 (L) 09/17/2021 0850          Lab Results   Component Value Date     388 (H) 07/09/2021         IMPRESSION:   Recurrent ovarian cancer. Original diagnosis 2005 with multiple recurrences. Diffuse metastasis. Recent active intra-abdominal bleeding due to splenic mass with GI infiltration. Status post embolization  S/p seizure disorder.      PLAN: Discussed palliative treatment. Different regimens were discussed. We prescribed Doxil as single agent but insurance declined. Patient was started on Doxil and Avastin. She tolerated treatment fairly well. However due to recent episode of intra-abdominal bleeding we will discontinue Avastin. Next treatment will be with the use of Doxil. Patient had embolization by vascular. Reccent hospitalization for seizure. No brain mets. Seizures controlled. Hospitalized last week with left leg DVT and anemia. Hemoglobin is now stable. She received blood transfusion. No active bleeding. She is currently on Eliquis for DVT. Tolerated well with no side effects. We will give blood transfusion for symptomatic anemia. With patient's symptoms and upon her request we will hold Gemzar chemotherapy today. We will check the tumor marker today and if this is rising we will consider switching to different treatment. Otherwise treatment will be resumed next week. Reminded about possible side effects. She agreed. Monitor toxicity and response to treatment. We will repeat tumor marker CA-125 and we will repeat CT scan soon. Patient's questions were answered to the best of her satisfaction and she verbalized full understanding and agreement.

## 2021-09-22 DIAGNOSIS — C79.51 CANCER, METASTATIC TO BONE (HCC): ICD-10-CM

## 2021-09-22 DIAGNOSIS — M54.59 INTRACTABLE LOW BACK PAIN: ICD-10-CM

## 2021-09-23 RX ORDER — OXYCODONE HYDROCHLORIDE AND ACETAMINOPHEN 5; 325 MG/1; MG/1
TABLET ORAL
Qty: 180 TABLET | Refills: 0 | Status: SHIPPED | OUTPATIENT
Start: 2021-09-23 | End: 2021-10-25

## 2021-09-27 DIAGNOSIS — C79.51 CANCER, METASTATIC TO BONE (HCC): ICD-10-CM

## 2021-09-27 DIAGNOSIS — M54.59 INTRACTABLE LOW BACK PAIN: ICD-10-CM

## 2021-09-27 RX ORDER — MORPHINE SULFATE 15 MG/1
TABLET, FILM COATED, EXTENDED RELEASE ORAL
Qty: 60 TABLET | Refills: 0 | Status: SHIPPED | OUTPATIENT
Start: 2021-09-27 | End: 2021-11-04

## 2021-09-27 RX ORDER — MORPHINE SULFATE 30 MG/1
TABLET, FILM COATED, EXTENDED RELEASE ORAL
Qty: 60 TABLET | Refills: 0 | Status: SHIPPED | OUTPATIENT
Start: 2021-09-27 | End: 2021-11-04

## 2021-09-28 ENCOUNTER — TELEPHONE (OUTPATIENT)
Dept: ONCOLOGY | Age: 56
End: 2021-09-28

## 2021-09-28 NOTE — TELEPHONE ENCOUNTER
Nelson SOLORZANO, RN  Navigator. Patient missed her CT Scan on 9/24/21. I called patient to offer assistance with rescheduling. I had to leave a voice mail. I left scheduling as well as my number. Rianna updated.

## 2021-10-04 RX ORDER — SERTRALINE HYDROCHLORIDE 100 MG/1
100 TABLET, FILM COATED ORAL DAILY
Qty: 30 TABLET | Refills: 3 | Status: SHIPPED | OUTPATIENT
Start: 2021-10-04 | End: 2022-03-21

## 2021-10-07 DIAGNOSIS — C56.9 MALIGNANT NEOPLASM OF OVARY, UNSPECIFIED LATERALITY (HCC): Primary | ICD-10-CM

## 2021-10-08 ENCOUNTER — HOSPITAL ENCOUNTER (OUTPATIENT)
Dept: INFUSION THERAPY | Age: 56
Discharge: HOME | End: 2021-10-08
Payer: COMMERCIAL

## 2021-10-08 VITALS
HEART RATE: 96 BPM | DIASTOLIC BLOOD PRESSURE: 75 MMHG | WEIGHT: 159.4 LBS | BODY MASS INDEX: 26.53 KG/M2 | SYSTOLIC BLOOD PRESSURE: 149 MMHG | TEMPERATURE: 97.8 F | RESPIRATION RATE: 18 BRPM

## 2021-10-08 DIAGNOSIS — C79.51 CANCER, METASTATIC TO BONE (HCC): ICD-10-CM

## 2021-10-08 DIAGNOSIS — Z85.43 HISTORY OF OVARIAN CANCER: ICD-10-CM

## 2021-10-08 DIAGNOSIS — C56.9 MALIGNANT NEOPLASM OF OVARY, UNSPECIFIED LATERALITY (HCC): Primary | ICD-10-CM

## 2021-10-08 LAB
ABSOLUTE EOS #: 0.19 K/UL (ref 0–0.4)
ABSOLUTE IMMATURE GRANULOCYTE: ABNORMAL K/UL (ref 0–0.3)
ABSOLUTE LYMPH #: 2.72 K/UL (ref 1–4.8)
ABSOLUTE MONO #: 0.87 K/UL (ref 0.1–1.2)
ALBUMIN SERPL-MCNC: 3.8 G/DL (ref 3.5–5.2)
ALBUMIN/GLOBULIN RATIO: 1.6 (ref 1–2.5)
ALP BLD-CCNC: 65 U/L (ref 35–104)
ALT SERPL-CCNC: <5 U/L (ref 5–33)
ANION GAP SERPL CALCULATED.3IONS-SCNC: 11 MMOL/L (ref 9–17)
AST SERPL-CCNC: 10 U/L
BASOPHILS # BLD: 2 % (ref 0–2)
BASOPHILS ABSOLUTE: 0.19 K/UL (ref 0–0.2)
BILIRUB SERPL-MCNC: 0.15 MG/DL (ref 0.3–1.2)
BUN BLDV-MCNC: 9 MG/DL (ref 6–20)
BUN/CREAT BLD: ABNORMAL (ref 9–20)
CA 125: 318 U/ML
CALCIUM SERPL-MCNC: 8.6 MG/DL (ref 8.6–10.4)
CHLORIDE BLD-SCNC: 104 MMOL/L (ref 98–107)
CO2: 23 MMOL/L (ref 20–31)
CREAT SERPL-MCNC: <0.4 MG/DL (ref 0.5–0.9)
DIFFERENTIAL TYPE: ABNORMAL
EOSINOPHILS RELATIVE PERCENT: 2 % (ref 1–4)
GFR AFRICAN AMERICAN: ABNORMAL ML/MIN
GFR NON-AFRICAN AMERICAN: ABNORMAL ML/MIN
GFR SERPL CREATININE-BSD FRML MDRD: ABNORMAL ML/MIN/{1.73_M2}
GFR SERPL CREATININE-BSD FRML MDRD: ABNORMAL ML/MIN/{1.73_M2}
GLUCOSE BLD-MCNC: 112 MG/DL (ref 70–99)
HCT VFR BLD CALC: 28.1 % (ref 36–46)
HEMOGLOBIN: 8.3 G/DL (ref 12–16)
IMMATURE GRANULOCYTES: ABNORMAL %
LYMPHOCYTES # BLD: 28 % (ref 24–44)
MCH RBC QN AUTO: 20.9 PG (ref 26–34)
MCHC RBC AUTO-ENTMCNC: 29.7 G/DL (ref 31–37)
MCV RBC AUTO: 70.5 FL (ref 80–100)
MONOCYTES # BLD: 9 % (ref 2–11)
MORPHOLOGY: ABNORMAL
NRBC AUTOMATED: ABNORMAL PER 100 WBC
PDW BLD-RTO: 22.7 % (ref 12.5–15.4)
PLATELET # BLD: 1182 K/UL (ref 140–450)
PLATELET ESTIMATE: ABNORMAL
PMV BLD AUTO: 6.5 FL (ref 6–12)
POTASSIUM SERPL-SCNC: 3.9 MMOL/L (ref 3.7–5.3)
RBC # BLD: 3.99 M/UL (ref 4–5.2)
RBC # BLD: ABNORMAL 10*6/UL
SEG NEUTROPHILS: 59 % (ref 36–66)
SEGMENTED NEUTROPHILS ABSOLUTE COUNT: 5.73 K/UL (ref 1.8–7.7)
SODIUM BLD-SCNC: 138 MMOL/L (ref 135–144)
TOTAL PROTEIN: 6.2 G/DL (ref 6.4–8.3)
WBC # BLD: 9.7 K/UL (ref 3.5–11)
WBC # BLD: ABNORMAL 10*3/UL

## 2021-10-08 PROCEDURE — 86304 IMMUNOASSAY TUMOR CA 125: CPT

## 2021-10-08 PROCEDURE — 2580000003 HC RX 258: Performed by: INTERNAL MEDICINE

## 2021-10-08 PROCEDURE — 96375 TX/PRO/DX INJ NEW DRUG ADDON: CPT

## 2021-10-08 PROCEDURE — 6360000002 HC RX W HCPCS: Performed by: INTERNAL MEDICINE

## 2021-10-08 PROCEDURE — 80053 COMPREHEN METABOLIC PANEL: CPT

## 2021-10-08 PROCEDURE — 36591 DRAW BLOOD OFF VENOUS DEVICE: CPT

## 2021-10-08 PROCEDURE — 85025 COMPLETE CBC W/AUTO DIFF WBC: CPT

## 2021-10-08 PROCEDURE — 96413 CHEMO IV INFUSION 1 HR: CPT

## 2021-10-08 RX ORDER — HEPARIN SODIUM (PORCINE) LOCK FLUSH IV SOLN 100 UNIT/ML 100 UNIT/ML
500 SOLUTION INTRAVENOUS PRN
Status: DISCONTINUED | OUTPATIENT
Start: 2021-10-08 | End: 2021-10-09 | Stop reason: HOSPADM

## 2021-10-08 RX ORDER — SODIUM CHLORIDE 9 MG/ML
20 INJECTION, SOLUTION INTRAVENOUS ONCE
Status: COMPLETED | OUTPATIENT
Start: 2021-10-08 | End: 2021-10-08

## 2021-10-08 RX ORDER — DEXAMETHASONE SODIUM PHOSPHATE 4 MG/ML
8 INJECTION, SOLUTION INTRA-ARTICULAR; INTRALESIONAL; INTRAMUSCULAR; INTRAVENOUS; SOFT TISSUE ONCE
Status: COMPLETED | OUTPATIENT
Start: 2021-10-08 | End: 2021-10-08

## 2021-10-08 RX ORDER — SODIUM CHLORIDE 0.9 % (FLUSH) 0.9 %
10 SYRINGE (ML) INJECTION PRN
Status: DISCONTINUED | OUTPATIENT
Start: 2021-10-08 | End: 2021-10-09 | Stop reason: HOSPADM

## 2021-10-08 RX ADMIN — DEXAMETHASONE SODIUM PHOSPHATE 8 MG: 4 INJECTION, SOLUTION INTRAMUSCULAR; INTRAVENOUS at 11:07

## 2021-10-08 RX ADMIN — SODIUM CHLORIDE, PRESERVATIVE FREE 10 ML: 5 INJECTION INTRAVENOUS at 12:13

## 2021-10-08 RX ADMIN — SODIUM CHLORIDE 20 ML/HR: 9 INJECTION, SOLUTION INTRAVENOUS at 11:07

## 2021-10-08 RX ADMIN — SODIUM CHLORIDE, PRESERVATIVE FREE 10 ML: 5 INJECTION INTRAVENOUS at 10:20

## 2021-10-08 RX ADMIN — GEMCITABINE 1800 MG: 38 INJECTION, SOLUTION INTRAVENOUS at 11:38

## 2021-10-08 RX ADMIN — HEPARIN SODIUM (PORCINE) LOCK FLUSH IV SOLN 100 UNIT/ML 500 UNITS: 100 SOLUTION at 12:13

## 2021-10-08 ASSESSMENT — PAIN SCALES - GENERAL: PAINLEVEL_OUTOF10: 8

## 2021-10-08 ASSESSMENT — PAIN DESCRIPTION - LOCATION: LOCATION: ABDOMEN

## 2021-10-08 NOTE — PROGRESS NOTES
Patient here for [phone]. Labs drawn from port and reviewed. Plt 1182  Dr. Meenu Bruno updated and ordered to proceed with treatment. Dr. Fang Gorves is on vacation. She tolerated treatment well and was discharged home in stable condition with family. She is due to return 10/15 for MD and C9D8 Gemzar.

## 2021-10-13 ENCOUNTER — TELEPHONE (OUTPATIENT)
Dept: INFUSION THERAPY | Age: 56
End: 2021-10-13

## 2021-10-13 NOTE — TELEPHONE ENCOUNTER
Patient called requesting antibiotics. She simply stated she did not feel well. I called her back to understand better what was going on. She informed me she has stomach pain. She thinks it could be an infection. I reminded her she see's the doctor on 10/15/21, this Friday. She stated she did not know if she would be good enough to come Friday. That he usually does not order antibiotics without seeing the patient. Labs work, examine and such. She stated she did not know how he could do anything in the office, to know it was an infection or not. I explained that I would talk to Dr. Dina Grey and call her back. She verbalized understanding. I called Dr. Mariano Swan, his mailbox was full. I then perfect served him.

## 2021-10-14 DIAGNOSIS — C79.60 MALIGNANT NEOPLASM METASTATIC TO OVARY, UNSPECIFIED LATERALITY (HCC): ICD-10-CM

## 2021-10-14 RX ORDER — AZITHROMYCIN 250 MG/1
250 TABLET, FILM COATED ORAL SEE ADMIN INSTRUCTIONS
Qty: 6 TABLET | Refills: 0 | Status: SHIPPED | OUTPATIENT
Start: 2021-10-14 | End: 2021-10-19

## 2021-10-14 NOTE — TELEPHONE ENCOUNTER
Dr. Mariano Swan responded via perfect serve. He ordered aother zpak. This was escribed to the patient's pharmacy. Patient notified.

## 2021-10-15 ENCOUNTER — HOSPITAL ENCOUNTER (OUTPATIENT)
Dept: INFUSION THERAPY | Age: 56
End: 2021-10-15
Payer: COMMERCIAL

## 2021-10-22 DIAGNOSIS — C79.51 CANCER, METASTATIC TO BONE (HCC): ICD-10-CM

## 2021-10-22 DIAGNOSIS — M54.59 INTRACTABLE LOW BACK PAIN: ICD-10-CM

## 2021-10-25 RX ORDER — OXYCODONE HYDROCHLORIDE AND ACETAMINOPHEN 5; 325 MG/1; MG/1
TABLET ORAL
Qty: 180 TABLET | Refills: 0 | Status: SHIPPED | OUTPATIENT
Start: 2021-10-25 | End: 2021-11-30

## 2021-10-28 ENCOUNTER — HOSPITAL ENCOUNTER (INPATIENT)
Age: 56
LOS: 4 days | Discharge: HOME | DRG: 755 | End: 2021-11-01
Attending: EMERGENCY MEDICINE | Admitting: INTERNAL MEDICINE
Payer: COMMERCIAL

## 2021-10-28 DIAGNOSIS — K59.00 CONSTIPATION, UNSPECIFIED CONSTIPATION TYPE: ICD-10-CM

## 2021-10-28 DIAGNOSIS — R10.9 ABDOMINAL PAIN, UNSPECIFIED ABDOMINAL LOCATION: ICD-10-CM

## 2021-10-28 DIAGNOSIS — D64.9 ANEMIA, UNSPECIFIED TYPE: Primary | ICD-10-CM

## 2021-10-28 DIAGNOSIS — G47.00 INSOMNIA, UNSPECIFIED TYPE: ICD-10-CM

## 2021-10-28 LAB
ABSOLUTE EOS #: 0.21 K/UL (ref 0–0.4)
ABSOLUTE IMMATURE GRANULOCYTE: 0 K/UL (ref 0–0.3)
ABSOLUTE LYMPH #: 1.26 K/UL (ref 1–4.8)
ABSOLUTE MONO #: 1.05 K/UL (ref 0.1–0.8)
ALBUMIN SERPL-MCNC: 3.4 G/DL (ref 3.5–5.2)
ALBUMIN/GLOBULIN RATIO: 1.5 (ref 1–2.5)
ALP BLD-CCNC: 58 U/L (ref 35–104)
ALT SERPL-CCNC: <5 U/L (ref 5–33)
ANION GAP SERPL CALCULATED.3IONS-SCNC: 11 MMOL/L (ref 9–17)
AST SERPL-CCNC: 7 U/L
BASOPHILS # BLD: 0 % (ref 0–2)
BASOPHILS ABSOLUTE: 0 K/UL (ref 0–0.2)
BILIRUB SERPL-MCNC: <0.1 MG/DL (ref 0.3–1.2)
BILIRUBIN URINE: NEGATIVE
BNP INTERPRETATION: NORMAL
BUN BLDV-MCNC: 13 MG/DL (ref 6–20)
BUN/CREAT BLD: ABNORMAL (ref 9–20)
CALCIUM SERPL-MCNC: 8.1 MG/DL (ref 8.6–10.4)
CHLORIDE BLD-SCNC: 102 MMOL/L (ref 98–107)
CO2: 23 MMOL/L (ref 20–31)
COLOR: YELLOW
COMMENT UA: ABNORMAL
CREAT SERPL-MCNC: 0.43 MG/DL (ref 0.5–0.9)
D-DIMER QUANTITATIVE: 0.18 MG/L FEU
DIFFERENTIAL TYPE: ABNORMAL
EOSINOPHILS RELATIVE PERCENT: 2 % (ref 1–4)
FERRITIN: 4 UG/L (ref 13–150)
GFR AFRICAN AMERICAN: >60 ML/MIN
GFR NON-AFRICAN AMERICAN: >60 ML/MIN
GFR SERPL CREATININE-BSD FRML MDRD: ABNORMAL ML/MIN/{1.73_M2}
GFR SERPL CREATININE-BSD FRML MDRD: ABNORMAL ML/MIN/{1.73_M2}
GLUCOSE BLD-MCNC: 96 MG/DL (ref 70–99)
GLUCOSE URINE: NEGATIVE
HCT VFR BLD CALC: 16.8 % (ref 36.3–47.1)
HCT VFR BLD CALC: 26 % (ref 36.3–47.1)
HCT VFR BLD CALC: 26.5 % (ref 36.3–47.1)
HEMOGLOBIN: 4.3 G/DL (ref 11.9–15.1)
HEMOGLOBIN: 7.1 G/DL (ref 11.9–15.1)
HEMOGLOBIN: 7.3 G/DL (ref 11.9–15.1)
IMMATURE GRANULOCYTES: 0 %
IRON SATURATION: 2 % (ref 20–55)
IRON: 7 UG/DL (ref 37–145)
KETONES, URINE: ABNORMAL
LEUKOCYTE ESTERASE, URINE: NEGATIVE
LIPASE: 9 U/L (ref 13–60)
LYMPHOCYTES # BLD: 12 % (ref 24–44)
MCH RBC QN AUTO: 18.8 PG (ref 25.2–33.5)
MCHC RBC AUTO-ENTMCNC: 25.6 G/DL (ref 28.4–34.8)
MCV RBC AUTO: 73.4 FL (ref 82.6–102.9)
MONOCYTES # BLD: 10 % (ref 1–7)
MORPHOLOGY: ABNORMAL
NITRITE, URINE: NEGATIVE
NRBC AUTOMATED: 0.3 PER 100 WBC
PDW BLD-RTO: 22.7 % (ref 11.8–14.4)
PH UA: 5 (ref 5–8)
PLATELET # BLD: 748 K/UL (ref 138–453)
PLATELET ESTIMATE: ABNORMAL
PMV BLD AUTO: 8.9 FL (ref 8.1–13.5)
POTASSIUM SERPL-SCNC: 3.9 MMOL/L (ref 3.7–5.3)
PRO-BNP: 230 PG/ML
PROTEIN UA: NEGATIVE
RBC # BLD: 2.29 M/UL (ref 3.95–5.11)
RBC # BLD: ABNORMAL 10*6/UL
SEG NEUTROPHILS: 76 % (ref 36–66)
SEGMENTED NEUTROPHILS ABSOLUTE COUNT: 7.98 K/UL (ref 1.8–7.7)
SODIUM BLD-SCNC: 136 MMOL/L (ref 135–144)
SPECIFIC GRAVITY UA: 1.02 (ref 1–1.03)
TOTAL IRON BINDING CAPACITY: 291 UG/DL (ref 250–450)
TOTAL PROTEIN: 5.6 G/DL (ref 6.4–8.3)
TROPONIN INTERP: NORMAL
TROPONIN T: NORMAL NG/ML
TROPONIN, HIGH SENSITIVITY: 7 NG/L (ref 0–14)
TURBIDITY: CLEAR
UNSATURATED IRON BINDING CAPACITY: 284 UG/DL (ref 112–347)
URINE HGB: NEGATIVE
UROBILINOGEN, URINE: NORMAL
WBC # BLD: 10.5 K/UL (ref 3.5–11.3)
WBC # BLD: ABNORMAL 10*3/UL

## 2021-10-28 PROCEDURE — 2580000003 HC RX 258: Performed by: STUDENT IN AN ORGANIZED HEALTH CARE EDUCATION/TRAINING PROGRAM

## 2021-10-28 PROCEDURE — 6370000000 HC RX 637 (ALT 250 FOR IP): Performed by: STUDENT IN AN ORGANIZED HEALTH CARE EDUCATION/TRAINING PROGRAM

## 2021-10-28 PROCEDURE — G0378 HOSPITAL OBSERVATION PER HR: HCPCS

## 2021-10-28 PROCEDURE — 6370000000 HC RX 637 (ALT 250 FOR IP): Performed by: NURSE PRACTITIONER

## 2021-10-28 PROCEDURE — C9113 INJ PANTOPRAZOLE SODIUM, VIA: HCPCS

## 2021-10-28 PROCEDURE — 6360000002 HC RX W HCPCS: Performed by: STUDENT IN AN ORGANIZED HEALTH CARE EDUCATION/TRAINING PROGRAM

## 2021-10-28 PROCEDURE — 2060000000 HC ICU INTERMEDIATE R&B

## 2021-10-28 PROCEDURE — 96365 THER/PROPH/DIAG IV INF INIT: CPT

## 2021-10-28 PROCEDURE — 83550 IRON BINDING TEST: CPT

## 2021-10-28 PROCEDURE — 93005 ELECTROCARDIOGRAM TRACING: CPT | Performed by: STUDENT IN AN ORGANIZED HEALTH CARE EDUCATION/TRAINING PROGRAM

## 2021-10-28 PROCEDURE — 36415 COLL VENOUS BLD VENIPUNCTURE: CPT

## 2021-10-28 PROCEDURE — 82728 ASSAY OF FERRITIN: CPT

## 2021-10-28 PROCEDURE — P9016 RBC LEUKOCYTES REDUCED: HCPCS

## 2021-10-28 PROCEDURE — 85025 COMPLETE CBC W/AUTO DIFF WBC: CPT

## 2021-10-28 PROCEDURE — 6360000002 HC RX W HCPCS

## 2021-10-28 PROCEDURE — 96375 TX/PRO/DX INJ NEW DRUG ADDON: CPT

## 2021-10-28 PROCEDURE — 96376 TX/PRO/DX INJ SAME DRUG ADON: CPT

## 2021-10-28 PROCEDURE — 86900 BLOOD TYPING SEROLOGIC ABO: CPT

## 2021-10-28 PROCEDURE — 84484 ASSAY OF TROPONIN QUANT: CPT

## 2021-10-28 PROCEDURE — 86880 COOMBS TEST DIRECT: CPT

## 2021-10-28 PROCEDURE — 86870 RBC ANTIBODY IDENTIFICATION: CPT

## 2021-10-28 PROCEDURE — 99285 EMERGENCY DEPT VISIT HI MDM: CPT

## 2021-10-28 PROCEDURE — 83540 ASSAY OF IRON: CPT

## 2021-10-28 PROCEDURE — 96374 THER/PROPH/DIAG INJ IV PUSH: CPT

## 2021-10-28 PROCEDURE — 2580000003 HC RX 258

## 2021-10-28 PROCEDURE — 83880 ASSAY OF NATRIURETIC PEPTIDE: CPT

## 2021-10-28 PROCEDURE — 81003 URINALYSIS AUTO W/O SCOPE: CPT

## 2021-10-28 PROCEDURE — 99222 1ST HOSP IP/OBS MODERATE 55: CPT | Performed by: INTERNAL MEDICINE

## 2021-10-28 PROCEDURE — 86850 RBC ANTIBODY SCREEN: CPT

## 2021-10-28 PROCEDURE — 83690 ASSAY OF LIPASE: CPT

## 2021-10-28 PROCEDURE — 85014 HEMATOCRIT: CPT

## 2021-10-28 PROCEDURE — 6370000000 HC RX 637 (ALT 250 FOR IP)

## 2021-10-28 PROCEDURE — 86901 BLOOD TYPING SEROLOGIC RH(D): CPT

## 2021-10-28 PROCEDURE — 96372 THER/PROPH/DIAG INJ SC/IM: CPT

## 2021-10-28 PROCEDURE — 80053 COMPREHEN METABOLIC PANEL: CPT

## 2021-10-28 PROCEDURE — 36430 TRANSFUSION BLD/BLD COMPNT: CPT

## 2021-10-28 PROCEDURE — 85018 HEMOGLOBIN: CPT

## 2021-10-28 PROCEDURE — 86920 COMPATIBILITY TEST SPIN: CPT

## 2021-10-28 PROCEDURE — 85379 FIBRIN DEGRADATION QUANT: CPT

## 2021-10-28 PROCEDURE — 86902 BLOOD TYPE ANTIGEN DONOR EA: CPT

## 2021-10-28 RX ORDER — LAMOTRIGINE 25 MG/1
75 TABLET ORAL 2 TIMES DAILY
Status: DISCONTINUED | OUTPATIENT
Start: 2021-10-28 | End: 2021-11-01 | Stop reason: HOSPADM

## 2021-10-28 RX ORDER — ACETAMINOPHEN 650 MG/1
650 SUPPOSITORY RECTAL EVERY 6 HOURS PRN
Status: DISCONTINUED | OUTPATIENT
Start: 2021-10-28 | End: 2021-11-01 | Stop reason: HOSPADM

## 2021-10-28 RX ORDER — OXYCODONE HYDROCHLORIDE AND ACETAMINOPHEN 5; 325 MG/1; MG/1
2 TABLET ORAL EVERY 4 HOURS PRN
Status: DISCONTINUED | OUTPATIENT
Start: 2021-10-28 | End: 2021-10-29

## 2021-10-28 RX ORDER — LEVETIRACETAM 500 MG/1
1500 TABLET ORAL 2 TIMES DAILY
Status: DISCONTINUED | OUTPATIENT
Start: 2021-10-28 | End: 2021-11-01 | Stop reason: HOSPADM

## 2021-10-28 RX ORDER — SODIUM CHLORIDE 9 MG/ML
INJECTION, SOLUTION INTRAVENOUS PRN
Status: DISCONTINUED | OUTPATIENT
Start: 2021-10-28 | End: 2021-11-01 | Stop reason: HOSPADM

## 2021-10-28 RX ORDER — ACETAMINOPHEN 325 MG/1
650 TABLET ORAL EVERY 6 HOURS PRN
Status: DISCONTINUED | OUTPATIENT
Start: 2021-10-28 | End: 2021-11-01 | Stop reason: HOSPADM

## 2021-10-28 RX ORDER — MORPHINE SULFATE 30 MG/1
30 TABLET ORAL 2 TIMES DAILY PRN
COMMUNITY
End: 2022-04-13 | Stop reason: SDUPTHER

## 2021-10-28 RX ORDER — SODIUM CHLORIDE 0.9 % (FLUSH) 0.9 %
5-40 SYRINGE (ML) INJECTION PRN
Status: DISCONTINUED | OUTPATIENT
Start: 2021-10-28 | End: 2021-11-01 | Stop reason: HOSPADM

## 2021-10-28 RX ORDER — SODIUM CHLORIDE 0.9 % (FLUSH) 0.9 %
5-40 SYRINGE (ML) INJECTION EVERY 12 HOURS SCHEDULED
Status: DISCONTINUED | OUTPATIENT
Start: 2021-10-28 | End: 2021-11-01 | Stop reason: HOSPADM

## 2021-10-28 RX ORDER — OXYCODONE HYDROCHLORIDE AND ACETAMINOPHEN 5; 325 MG/1; MG/1
1 TABLET ORAL EVERY 4 HOURS PRN
Status: DISCONTINUED | OUTPATIENT
Start: 2021-10-28 | End: 2021-10-29

## 2021-10-28 RX ORDER — SODIUM CHLORIDE 9 MG/ML
10 INJECTION INTRAVENOUS DAILY
Status: DISCONTINUED | OUTPATIENT
Start: 2021-10-28 | End: 2021-11-01 | Stop reason: HOSPADM

## 2021-10-28 RX ORDER — PANTOPRAZOLE SODIUM 40 MG/10ML
40 INJECTION, POWDER, LYOPHILIZED, FOR SOLUTION INTRAVENOUS DAILY
Status: DISCONTINUED | OUTPATIENT
Start: 2021-10-28 | End: 2021-11-01 | Stop reason: HOSPADM

## 2021-10-28 RX ORDER — FENTANYL CITRATE 50 UG/ML
50 INJECTION, SOLUTION INTRAMUSCULAR; INTRAVENOUS ONCE
Status: COMPLETED | OUTPATIENT
Start: 2021-10-28 | End: 2021-10-28

## 2021-10-28 RX ORDER — 0.9 % SODIUM CHLORIDE 0.9 %
1000 INTRAVENOUS SOLUTION INTRAVENOUS ONCE
Status: COMPLETED | OUTPATIENT
Start: 2021-10-28 | End: 2021-10-28

## 2021-10-28 RX ORDER — SODIUM CHLORIDE 9 MG/ML
25 INJECTION, SOLUTION INTRAVENOUS PRN
Status: DISCONTINUED | OUTPATIENT
Start: 2021-10-28 | End: 2021-11-01 | Stop reason: HOSPADM

## 2021-10-28 RX ORDER — ONDANSETRON 4 MG/1
4 TABLET, ORALLY DISINTEGRATING ORAL EVERY 8 HOURS PRN
Status: DISCONTINUED | OUTPATIENT
Start: 2021-10-28 | End: 2021-11-01 | Stop reason: HOSPADM

## 2021-10-28 RX ORDER — SENNA PLUS 8.6 MG/1
2 TABLET ORAL NIGHTLY
Status: DISCONTINUED | OUTPATIENT
Start: 2021-10-28 | End: 2021-11-01 | Stop reason: HOSPADM

## 2021-10-28 RX ORDER — ONDANSETRON 2 MG/ML
4 INJECTION INTRAMUSCULAR; INTRAVENOUS EVERY 6 HOURS PRN
Status: DISCONTINUED | OUTPATIENT
Start: 2021-10-28 | End: 2021-11-01 | Stop reason: HOSPADM

## 2021-10-28 RX ORDER — POLYETHYLENE GLYCOL 3350 17 G/17G
17 POWDER, FOR SOLUTION ORAL DAILY PRN
Status: DISCONTINUED | OUTPATIENT
Start: 2021-10-28 | End: 2021-11-01 | Stop reason: HOSPADM

## 2021-10-28 RX ADMIN — ONDANSETRON 4 MG: 2 INJECTION INTRAMUSCULAR; INTRAVENOUS at 20:54

## 2021-10-28 RX ADMIN — FENTANYL CITRATE 50 MCG: 50 INJECTION, SOLUTION INTRAMUSCULAR; INTRAVENOUS at 12:30

## 2021-10-28 RX ADMIN — FENTANYL CITRATE 50 MCG: 50 INJECTION, SOLUTION INTRAMUSCULAR; INTRAVENOUS at 16:02

## 2021-10-28 RX ADMIN — POLYETHYLENE GLYCOL 3350 17 G: 17 POWDER, FOR SOLUTION ORAL at 20:54

## 2021-10-28 RX ADMIN — PANTOPRAZOLE SODIUM 40 MG: 40 INJECTION, POWDER, FOR SOLUTION INTRAVENOUS at 16:02

## 2021-10-28 RX ADMIN — ENOXAPARIN SODIUM 70 MG: 80 INJECTION SUBCUTANEOUS at 20:53

## 2021-10-28 RX ADMIN — LEVETIRACETAM 1500 MG: 500 TABLET, FILM COATED ORAL at 20:53

## 2021-10-28 RX ADMIN — SODIUM CHLORIDE 1000 ML: 9 INJECTION, SOLUTION INTRAVENOUS at 12:30

## 2021-10-28 RX ADMIN — SODIUM CHLORIDE, PRESERVATIVE FREE 10 ML: 5 INJECTION INTRAVENOUS at 20:55

## 2021-10-28 RX ADMIN — IRON SUCROSE 300 MG: 20 INJECTION, SOLUTION INTRAVENOUS at 22:59

## 2021-10-28 RX ADMIN — LAMOTRIGINE 75 MG: 25 TABLET ORAL at 20:53

## 2021-10-28 RX ADMIN — OXYCODONE HYDROCHLORIDE AND ACETAMINOPHEN 2 TABLET: 5; 325 TABLET ORAL at 20:53

## 2021-10-28 RX ADMIN — SENNOSIDES 17.2 MG: 8.6 TABLET, FILM COATED ORAL at 23:30

## 2021-10-28 RX ADMIN — SODIUM CHLORIDE, PRESERVATIVE FREE 10 ML: 5 INJECTION INTRAVENOUS at 16:02

## 2021-10-28 ASSESSMENT — PAIN DESCRIPTION - PAIN TYPE
TYPE: ACUTE PAIN;CHRONIC PAIN
TYPE: ACUTE PAIN
TYPE: ACUTE PAIN

## 2021-10-28 ASSESSMENT — ENCOUNTER SYMPTOMS
SORE THROAT: 0
ALLERGIC/IMMUNOLOGIC NEGATIVE: 1
SHORTNESS OF BREATH: 0
CONSTIPATION: 0
WHEEZING: 0
ABDOMINAL PAIN: 0
SINUS PAIN: 0
CHEST TIGHTNESS: 0
COUGH: 0
NAUSEA: 0
BACK PAIN: 0
FACIAL SWELLING: 0
VOMITING: 0
COLOR CHANGE: 0
DIARRHEA: 0

## 2021-10-28 ASSESSMENT — PAIN SCALES - GENERAL
PAINLEVEL_OUTOF10: 9
PAINLEVEL_OUTOF10: 8
PAINLEVEL_OUTOF10: 8
PAINLEVEL_OUTOF10: 9
PAINLEVEL_OUTOF10: 7
PAINLEVEL_OUTOF10: 7
PAINLEVEL_OUTOF10: 5
PAINLEVEL_OUTOF10: 8
PAINLEVEL_OUTOF10: 5

## 2021-10-28 ASSESSMENT — PAIN DESCRIPTION - LOCATION
LOCATION: ABDOMEN
LOCATION: BACK
LOCATION: ABDOMEN;BACK

## 2021-10-28 ASSESSMENT — PAIN DESCRIPTION - PROGRESSION: CLINICAL_PROGRESSION: GRADUALLY IMPROVING

## 2021-10-28 ASSESSMENT — PAIN DESCRIPTION - DESCRIPTORS: DESCRIPTORS: ACHING

## 2021-10-28 NOTE — ED PROVIDER NOTES
[facility] ED     Emergency Department     Faculty Attestation        I performed a history and physical examination of the patient and discussed management with the resident. I reviewed the residents note and agree with the documented findings and plan of care. Any areas of disagreement are noted on the chart. I was personally present for the key portions of any procedures. I have documented in the chart those procedures where I was not present during the key portions. I have reviewed the emergency nurses triage note. I agree with the chief complaint, past medical history, past surgical history, allergies, medications, social and family history as documented unless otherwise noted below. For mid-level providers such as nurse practitioners as well as physicians assistants:    I have personally seen and evaluated the patient. I find the patient's history and physical exam are consistent with NP/PA documentation. I agree with the care provided, treatment rendered, disposition, & follow-up plan. Additional findings are as noted. Vital Signs: BP (!) 124/53   Pulse 96   Temp 97.3 °F (36.3 °C)   Resp 12   Wt 158 lb (71.7 kg)   SpO2 97%   BMI 26.29 kg/m²   PCP:  Zhou Taylor MD    Pertinent Comments:     Patient complains of weakness and fatigue. She has a history of ovarian cancer is on chemotherapy but missed her last 3 appointments. She states she feels very weak and fatigued and cannot ambulate without feel like her heart is pounding. She is no chest pain or shortness of breath. She states she feels like she did when she was anemic. She has no blood in her stool by is constipated. She is anticoagulated on Eliquis her last dose was this morning.   She is otherwise afebrile nontoxic exam hemodynamically stable resting comfortably no acute distress will check broad laboratory work-up, anticipate admission      Critical Care  None          Britney Lewis MD    Attending Emergency Medicine Physician              Elayne Padilla MD  10/28/21 [id number]

## 2021-10-28 NOTE — ED NOTES
Writer attempted IV access without success, Dr. Yobany Arce notified of needing US IV placement.      Alto Closs, RN  10/28/21 [id number]

## 2021-10-28 NOTE — ED NOTES
Writer Perfect Served admitting service regarding pain meds, waiting on response.      Nabila Maciel RN  10/28/21 [id number]

## 2021-10-28 NOTE — ED NOTES
Dr. Nelson Persaud at bedside performing rectal exam, writer at bedside for entire exam.  Hemoccult positive per Dr. Lawson Sandoval, RN  10/28/21 0097

## 2021-10-28 NOTE — H&P
[address]     Department of Internal Medicine - Staff Internal Medicine Teaching Service          ADMISSION NOTE/HISTORY AND PHYSICAL EXAMINATION   Date: 10/28/2021  Patient Name: Constantino Hernandez  Date of admission: 10/28/2021 10:54 AM  YOB: 1965  PCP: Ursula Mcfadden MD  History Obtained From: chart review and patient    CHIEF COMPLAINT     Chief complaint: Fatigue, weakness    HISTORY OF PRESENTING ILLNESS     The patient is a 60 y. o.female with PMH of recurrent ovarian cancer (initially diagnosed in 2005) receiving chemotherapy who presents to the ED today due to fatigue, weakness for the last couple of days. Patient states it is generalized all over her body, denies any nausea, vomiting. Patient states that she has episode of dizziness when getting up from the bed, but denies any history of fall or syncope. Patient denies any tingling, numbness, any slurred speech. Patient also states that she has been constipated and her last bowel movement was 4 to 5 days ago which is new for her. She also complains of intermittent SOB since all of this started. Patient never had a colonoscopy before, no family history of colon cancer. On arrival to the ED, patient heart rate was in the higher 90s, other vitals were stable and was saturating at 87% on room air so she was placed on 3 L NC. Pertinent labs were ordered which showed a hemoglobin of 4.3, Plt was above 700. Blood transfusion was ordered. When I went to evaluate the patient patient was lying comfortably in the bed, with 3 L nasal cannula. Patient denied headache, vision problems, nausea, vomiting, chest pain, cough, abdominal pain, changes in bowel or urinary habits, and swelling. Patient follows up with Dr. Sourav Guardado for her recurrent ovarian cancer, patient was on Doxil/Avastin which was started on 9/18/2020 which was discontinued due to GI bleed. Patient was started on Gemzar on 2/26/2021.   Patient did not receive it due to hospitalization. Recent Hospitalization:  -6/24: Was admitted for status epilepticus, stable on discharge  -3/12: Patient was admitted for anemia required 1 unit packed RBC. PMH:  -Recurrent ovarian cancer initially diagnosed in 2005 metastasis to the bone, receiving chemotherapy. -DVT (1/2021)-on Eliquis  -History of intra-abdominal bleed due to splenic mass s/p coiling and embolization 10/2020.  -Hypertension  -Seizure disorder      Social:  -Tobacco: 1 pack/day  -Alcohol: Negative  -Illicit Drug Use: Negative    Review of Systems:  Review of Systems   Constitutional: Negative for diaphoresis, fatigue and fever. HENT: Negative for facial swelling, hearing loss, sinus pain, sore throat and tinnitus. Respiratory: Negative for cough, chest tightness, shortness of breath and wheezing. Cardiovascular: Negative for chest pain, palpitations and leg swelling. Gastrointestinal: Negative for abdominal pain, constipation, diarrhea, nausea and vomiting. Endocrine: Negative. Genitourinary: Negative for difficulty urinating, dysuria, flank pain and hematuria. Musculoskeletal: Negative for back pain. Skin: Negative for color change. Allergic/Immunologic: Negative. Neurological: Positive for dizziness (when getting up the bed. ) and weakness. Negative for seizures and headaches. Hematological: Negative. Psychiatric/Behavioral: Negative for agitation, confusion, hallucinations and suicidal ideas. PAST MEDICAL HISTORY     Past Medical History:   Diagnosis Date    Anemia     Bleeding 10/2020    intra-abdominal bleeding -due to splenic mass with GI infiltration. Status post embolization    [facility] [address]     Depression     Diabetes mellitus ([facility] [address]     GERD (gastroesophageal reflux disease)     Hx of blood clots     Hypertension     Metastatic cancer ([facility] [address] 10/2020    extensive intraabdominal and splenic involvement and lung mets.     Ovarian cancer ([facility] [address]     low grade serous ovarian carcinoma    Post chemo evaluation     2007: Chemo via med onc (Dr. Thee Sawyer), 2008: [facility] due to rising CA-125, 2013: intraperitoneal chemo,12/2015: Ca125 - 25     Splenic lesion        PAST SURGICAL HISTORY     Past Surgical History:   Procedure Laterality Date    ABSCESS DRAINAGE  2013    Franca rectal    ANUS SURGERY      ANAL FISSURECTOMY    CARDIAC CATHETERIZATION      COLECTOMY  03/2013    ex lap, tumor debulking, transverse colectomy w reanastamosis, subgastric omentectomy, intraperitoneal port placement    HYSTERECTOMY, TOTAL ABDOMINAL      IR EMBOLIZATION HEMORRHAGE  10/05/2020    intra-abdominal bleeding -due to splenic mass with GI infiltration. Status post embolization [employer] interlock coils x7. mri condtional 3t ok, safe immediately post implant.  IR PORT PLACEMENT EQUAL OR GREATER THAN 5 YEARS  08/24/2020    IR PORT PLACEMENT EQUAL OR GREATER THAN 5 YEARS 8/24/2020 Cristóbal Nichols MD [facility]    PORT SURGERY      IP Port    TONSILLECTOMY         ALLERGIES     Ceftriaxone    MEDICATIONS PRIOR TO ADMISSION     Prior to Admission medications    Medication Sig Start Date End Date Taking?  Authorizing Provider   oxyCODONE-acetaminophen (PERCOCET) 5-325 MG per tablet TAKE 1 TABLET BY MOUTH EVERY 4 HOURS AS NEEDED FOR PAIN (BREAKTHROUGH PAIN) - REDUCE DOSES TAKEN AS PAIN BECOMES MANAGEABLE 10/25/21 11/24/21  Ursula Mcfadden MD   lamoTRIgine (LAMICTAL) 25 MG tablet TAKE 3 TABS IN IN THE MORNING AND 3 TABS IN IN THE EVENING 10/5/21   Jr Juarez MD   sertraline (ZOLOFT) 100 MG tablet TAKE 1 TABLET BY MOUTH DAILY 10/4/21 11/3/21  Ursula Mcfadden MD   dicyclomine (BENTYL) 20 MG tablet Take 1 tablet by mouth 3 times daily as needed (cramps)  Patient not taking: Reported on 9/17/2021 8/27/21   Ursula Mcfadden MD   levETIRAcetam (KEPPRA) 750 MG tablet Take 2 tablets by mouth 2 times daily 7/12/21   Mic Gil MD ondansetron (ZOFRAN-ODT) 4 MG disintegrating tablet Take 1 tablet by mouth every 8 hours as needed for Nausea or Vomiting 21   Fred Pérez MD   ELIQUIS 5 MG TABS tablet Take 5 mg by mouth 2 times daily  21   Historical Provider, MD   pantoprazole (PROTONIX) 40 MG tablet Take 1 tablet by mouth 2 times daily  Patient not taking: Reported on 2021 5/10/21   Rosy Kelley MD   ferrous sulfate (FE TABS 325) 325 (65 Fe) MG EC tablet Take 1 tablet by mouth daily (with breakfast)  Patient not taking: Reported on 2021 3/12/21   Felicia Isidro, APRN - NP   aspirin 81 MG chewable tablet Take 81 mg by mouth nightly Pt not taking    Historical Provider, MD       SOCIAL HISTORY     Social History     Tobacco Use    Smoking status: Current Every Day Smoker     Packs/day: 1.00     Types: Cigarettes    Smokeless tobacco: Current User   Vaping Use    Vaping Use: Never used   Substance Use Topics    Alcohol use: Not Currently    Drug use: Never         FAMILY HISTORY     Family History   Problem Relation Age of Onset    Alcohol Abuse Mother     Cirrhosis Mother        PHYSICAL EXAM     Vitals: BP (!) 123/52   Pulse 90   Temp 99.3 °F (37.4 °C) (Oral)   Resp 16   Wt 158 lb (71.7 kg)   SpO2 95%   BMI 26.29 kg/m²   Tmax: Temp (24hrs), Av.4 °F (36.9 °C), Min:97.3 °F (36.3 °C), Max:99.3 °F (37.4 °C)    Last Body weight:   Wt Readings from Last 3 Encounters:   10/28/21 158 lb (71.7 kg)   10/08/21 159 lb 6.4 oz (72.3 kg)   21 156 lb 8 oz (71 kg)     Body Mass Index : Body mass index is 26.29 kg/m². PHYSICAL EXAMINATION:  Physical Exam  Vitals reviewed. Constitutional:       General: She is awake. Appearance: Normal appearance. She is normal weight. HENT:      Head: Normocephalic. Eyes:      General: Lids are normal.      Pupils: Pupils are equal, round, and reactive to light. Cardiovascular:      Rate and Rhythm: Normal rate and regular rhythm. Pulses: Normal pulses. Heart sounds: Normal heart sounds. Pulmonary:      Effort: Pulmonary effort is normal.      Breath sounds: Normal breath sounds and air entry. Musculoskeletal:      Right lower leg: No swelling. Left lower leg: No swelling. Skin:     General: Skin is warm. Capillary Refill: Capillary refill takes less than 2 seconds. Neurological:      Mental Status: She is alert and oriented to person, place, and time. Psychiatric:         Attention and Perception: Attention normal.         Mood and Affect: Mood normal.         Speech: Speech normal.         Behavior: Behavior is cooperative. INVESTIGATIONS     Laboratory Testing:     Recent Results (from the past 24 hour(s))   Urinalysis Reflex to Culture    Collection Time: 10/28/21 12:22 PM    Specimen: Urine voided   Result Value Ref Range    Color, UA Yellow Yellow    Turbidity UA Clear Clear    Glucose, Ur NEGATIVE NEGATIVE    Bilirubin Urine NEGATIVE NEGATIVE    Ketones, Urine SMALL (A) NEGATIVE    Specific Gravity, UA 1.020 1.005 - 1.030    Urine Hgb NEGATIVE NEGATIVE    pH, UA 5.0 5.0 - 8.0    Protein, UA NEGATIVE NEGATIVE    Urobilinogen, Urine Normal Normal    Nitrite, Urine NEGATIVE NEGATIVE    Leukocyte Esterase, Urine NEGATIVE NEGATIVE    Urinalysis Comments       Microscopic exam not performed based on chemical results unless requested in original order.    CBC Auto Differential    Collection Time: 10/28/21 12:28 PM   Result Value Ref Range    WBC 10.5 3.5 - 11.3 k/uL    RBC 2.29 (L) 3.95 - 5.11 m/uL    Hemoglobin 4.3 (LL) 11.9 - 15.1 g/dL    Hematocrit 16.8 (L) 36.3 - 47.1 %    MCV 73.4 (L) 82.6 - 102.9 fL    MCH 18.8 (L) 25.2 - 33.5 pg    MCHC 25.6 (L) 28.4 - 34.8 g/dL    RDW 22.7 (H) 11.8 - 14.4 %    Platelets 470 (H) 356 - 453 k/uL    MPV 8.9 8.1 - 13.5 fL    NRBC Automated 0.3 (H) 0.0 per 100 WBC    Differential Type NOT REPORTED     WBC Morphology NOT REPORTED     RBC Morphology NOT REPORTED     Platelet Estimate NOT REPORTED Immature Granulocytes 0 0 %    Seg Neutrophils 76 (H) 36 - 66 %    Lymphocytes 12 (L) 24 - 44 %    Monocytes 10 (H) 1 - 7 %    Eosinophils % 2 1 - 4 %    Basophils 0 0 - 2 %    Absolute Immature Granulocyte 0.00 0.00 - 0.30 k/uL    Segs Absolute 7.98 (H) 1.8 - 7.7 k/uL    Absolute Lymph # 1.26 1.0 - 4.8 k/uL    Absolute Mono # 1.05 (H) 0.1 - 0.8 k/uL    Absolute Eos # 0.21 0.0 - 0.4 k/uL    Basophils Absolute 0.00 0.0 - 0.2 k/uL    Morphology ANISOCYTOSIS PRESENT     Morphology MICROCYTOSIS PRESENT     Morphology HYPOCHROMIA PRESENT     Morphology 1+ POLYCHROMASIA    Comprehensive Metabolic Panel w/ Reflex to MG    Collection Time: 10/28/21 12:28 PM   Result Value Ref Range    Glucose 96 70 - 99 mg/dL    BUN 13 6 - 20 mg/dL    CREATININE 0.43 (L) 0.50 - 0.90 mg/dL    Bun/Cre Ratio NOT REPORTED 9 - 20    Calcium 8.1 (L) 8.6 - 10.4 mg/dL    Sodium 136 135 - 144 mmol/L    Potassium 3.9 3.7 - 5.3 mmol/L    Chloride 102 98 - 107 mmol/L    CO2 23 20 - 31 mmol/L    Anion Gap 11 9 - 17 mmol/L    Alkaline Phosphatase 58 35 - 104 U/L    ALT <5 (L) 5 - 33 U/L    AST 7 <32 U/L    Total Bilirubin <0.10 (L) 0.3 - 1.2 mg/dL    Total Protein 5.6 (L) 6.4 - 8.3 g/dL    Albumin 3.4 (L) 3.5 - 5.2 g/dL    Albumin/Globulin Ratio 1.5 1.0 - 2.5    GFR Non-African American >60 >60 mL/min    GFR African American >60 >60 mL/min    GFR Comment          GFR Staging NOT REPORTED    Lipase    Collection Time: 10/28/21 12:28 PM   Result Value Ref Range    Lipase 9 (L) 13 - 60 U/L   D-Dimer, Quantitative    Collection Time: 10/28/21 12:28 PM   Result Value Ref Range    D-Dimer, Quant 0.18 mg/L FEU   Troponin    Collection Time: 10/28/21 12:28 PM   Result Value Ref Range    Troponin, High Sensitivity 7 0 - 14 ng/L    Troponin T NOT REPORTED <0.03 ng/mL    Troponin Interp NOT REPORTED    Brain Natriuretic Peptide    Collection Time: 10/28/21 12:28 PM   Result Value Ref Range    Pro- <300 pg/mL    BNP Interpretation NOT REPORTED    TYPE AND

## 2021-10-28 NOTE — ED NOTES
Pt arrived to ED alert and oriented x4. Pt c/o fatigue, weakness, abdominal pain, and constipation. Pt reports that she received Chemo, states she does treatment for 2 weeks and is \"off\" 1 week. Pt reports that she did not receive her recent Chemo treatment. Pt reports that she feels weak and tired more than usual, states that she is not sure whether her Hgb is low and is contributing to her fatigue. Pt reports lower abdominal pain that radiates into her back, states that she believes it is r/t her not having a bowel movement. Pt reports constipation x4 days, denies n/v/d. Pt denies having been around anyone suspected to have COVID-19 or anyone that has been sick, denies recent travel outside the [facility], [facility] or [address]. Pt placed on cardiac monitor, continuous pulse ox, and BP cuff. Pt placed on 3L via NC d/t SpO2 reading of 84% on RA with good pleth, reports intermittent SOB. RR even and unlabored. NAD noted. Whiteboard updated. Will continue with plan of care.      Chyu Hay, JAYA  10/28/21 [id number]

## 2021-10-28 NOTE — ED NOTES
Dr. Agata Felix at bedside to place US line and obtain lab work     Alejandro Montoya [facility]  10/28/21 [id number]

## 2021-10-28 NOTE — ED NOTES
Labeled blood specimens sent to lab via tube system.     [x] Lavender   [] on ice   [x] Blue   [x] Green/yellow  [] Green/black [] on ice  [] Pink  [] Red  [] Yellow  [] Blood Cultures      Kashif Weiss RN  10/28/21 [id number]

## 2021-10-28 NOTE — ED NOTES
Labeled blood specimens sent to lab via tube system.     [x] Lavender   [] on ice   [] Blue   [] Green/yellow  [] Green/black [] on ice  [] Pink  [] Red  [] Yellow  [] Blood Cultures      Bull Gallo RN  10/28/21 [id number]

## 2021-10-28 NOTE — ED PROVIDER NOTES
[facility] ED  Emergency Department Encounter  EmergencyMedicine Resident     Pt Name:Breanna Bean Cons  MRN: [id number]  Álvarogfdebra 1965  Date of evaluation: 10/28/21  PCP:  Bel Sheppard MD    [address] [place] [address]       Chief Complaint   Patient presents with    Fatigue    Extremity Weakness       HISTORY OF PRESENT ILLNESS  (Location/Symptom, Timing/Onset, Context/Setting, Quality, Duration, Modifying Factors, Severity.)      Yesenia Wood is a 60 y.o. female who presents with chief complaint fatigue, generalized weakness. Patient states she feels exactly the way she did when she was found to be anemic with hemoglobin 4 and requiring blood transfusions. Patient has a significant past medical history for ovarian cancer with mets. Had a total hysterectomy in 2015 when she was diagnosed with cancer. She is having ongoing chemotherapy however as this past several appointments. She has mets to the peritoneal cavity. Patient reports within the past year had bleeding from her spleen had IR embolization of the spleen. Last February she was found to have DVT and started on Eliquis. She is denying any emesis. She is a chronic pain patient, takes 45 mg morphine and Percocets as needed. Has acute on chronic constipation last bowel movement 4 days ago. Acute on chronic diffuse abdominal pain 7/10. States this is her typical chronic cancer pain and she feels bloated and constipated. Denies loose stools bloody stools tarry stools. PAST MEDICAL / SURGICAL / SOCIAL / FAMILY HISTORY      has a past medical history of Anemia, Bleeding, Cervical cancer ([address] Depression, Diabetes mellitus ([address] GERD (gastroesophageal reflux disease), Hx of blood clots, Hypertension, Metastatic cancer ([address] Ovarian cancer ([address] Post chemo evaluation, and Splenic lesion. has a past surgical history that includes Hysterectomy, total abdominal; Port Surgery;  Tonsillectomy; IR PORT PLACEMENT > 5 YEARS (08/24/2020); Anus surgery; Abscess Drainage (2013); colectomy (03/2013); IR EMBOLIZATION HEMORRHAGE (10/05/2020); and Cardiac catheterization. Social History     Socioeconomic History    Marital status: Single     Spouse name: Not on file    Number of children: Not on file    Years of education: Not on file    Highest education level: Not on file   Occupational History    Not on file   Tobacco Use    Smoking status: Current Every Day Smoker     Packs/day: 1.00     Types: Cigarettes    Smokeless tobacco: Current User   Vaping Use    Vaping Use: Never used   Substance and Sexual Activity    Alcohol use: Not Currently    Drug use: Never    Sexual activity: Not on file   Other Topics Concern    Not on file   Social History Narrative    Not on file     Social Determinants of Health     Financial Resource Strain:     Difficulty of Paying Living Expenses:    Food Insecurity:     Worried About Running Out of Food in the Last Year:     [address] in the Last Year:    Transportation Needs:     Lack of Transportation (Medical):  Lack of Transportation (Non-Medical):    Physical Activity:     Days of Exercise per Week:     Minutes of Exercise per Session:    Stress:     Feeling of Stress :    Social Connections:     Frequency of Communication with Friends and Family:     Frequency of Social Gatherings with Friends and Family:     Attends [employer] Services:     Active Member of Clubs or Organizations:     Attends Club or Organization Meetings:     Marital Status:    Intimate Partner Violence:     Fear of Current or Ex-Partner:     Emotionally Abused:     Physically Abused:     Sexually Abused:        Family History   Problem Relation Age of Onset    Alcohol Abuse Mother     Cirrhosis Mother        Allergies:  Ceftriaxone    Home Medications:  Prior to Admission medications    Medication Sig Start Date End Date Taking?  Authorizing Provider   oxyCODONE-acetaminophen (PERCOCET) 5-325 MG per tablet TAKE 1 TABLET BY MOUTH EVERY 4 HOURS AS NEEDED FOR PAIN (BREAKTHROUGH PAIN) - REDUCE DOSES TAKEN AS PAIN BECOMES MANAGEABLE 10/25/21 11/24/21  Claudell Morale, MD   lamoTRIgine (LAMICTAL) 25 MG tablet TAKE 3 TABS IN IN THE MORNING AND 3 TABS IN IN THE EVENING 10/5/21   Samer Crawford Kehr, MD   sertraline (ZOLOFT) 100 MG tablet TAKE 1 TABLET BY MOUTH DAILY 10/4/21 11/3/21  Claudell Morale, MD   dicyclomine (BENTYL) 20 MG tablet Take 1 tablet by mouth 3 times daily as needed (cramps)  Patient not taking: Reported on 9/17/2021 8/27/21   Claudell Morale, MD   levETIRAcetam (KEPPRA) 750 MG tablet Take 2 tablets by mouth 2 times daily 7/12/21   Samer Crawford Kehr, MD   ondansetron (ZOFRAN-ODT) 4 MG disintegrating tablet Take 1 tablet by mouth every 8 hours as needed for Nausea or Vomiting 6/23/21   Dejan Guajardo MD   ELIQUIS 5 MG TABS tablet Take 5 mg by mouth 2 times daily  5/26/21   Historical Provider, MD   pantoprazole (PROTONIX) 40 MG tablet Take 1 tablet by mouth 2 times daily  Patient not taking: Reported on 7/9/2021 5/10/21   Tamica Bhatia MD   ferrous sulfate (FE TABS 325) 325 (65 Fe) MG EC tablet Take 1 tablet by mouth daily (with breakfast)  Patient not taking: Reported on 7/9/2021 3/12/21   ESTRELLITA Contreras NP   aspirin 81 MG chewable tablet Take 81 mg by mouth nightly Pt not taking    Historical Provider, MD       REVIEW OF SYSTEMS    (2-9 systems for level 4, 10 or more for level 5)      Review of Systems   Constitutional: Positive for fatigue  HENT: Negative for congestion. Eyes: Negative for photophobia. Respiratory: Negative for shortness of breath. Cardiovascular: Negative for chest pain. Gastrointestinal: Positive for abdominal pain  Endocrine: Negative for polyuria. Genitourinary: Negative for dysuria. Musculoskeletal: Negative for arthralgias. Skin: Negative for color change. Allergic/Immunologic: Negative for immunocompromised state.    Neurological: Negative for dizziness. Hematological: Does not bruise/bleed easily. Psychiatric/Behavioral: Negative for agitation. PHYSICAL EXAM   (up to 7 for level 4, 8 or more for level 5)      INITIAL VITALS:   /60   Pulse 90   Temp 99.3 °F (37.4 °C) (Oral)   Resp 16   Wt 158 lb (71.7 kg)   SpO2 95%   BMI 26.29 kg/m²     Physical Exam  Constitutional:       General: Not in acute distress. Appearance: Pale appearance. Normal weight. Not toxic-appearing. HENT:      Head: Normocephalic and atraumatic. Nose: Nose normal.      Mouth/Throat: Mucous membranes are moist.  Uvula midline no oropharyngeal edema. Pharynx: Oropharynx is clear. Eyes:      Extraocular Movements: Extraocular movements intact. Conjunctiva/sclera: Conjunctivae normal.      Pupils: Pupils are equal, round, and reactive to light. Neck:      Musculoskeletal: Normal range of motion and neck supple. No neck rigidity. Cardiovascular:      Rate and Rhythm: Normal rate and regular rhythm. Pulses: Normal pulses. Heart sounds: Normal heart sounds. No murmur. Pulmonary:      Effort: Pulmonary effort is normal.      Breath sounds: Normal breath sounds. No wheezing. Abdominal:      General: Abdomen is flat. Bowel sounds are normal.      Tenderness: There is no abdominal tenderness. Musculoskeletal:     Normal range of motion. General: No swelling or tenderness. No LE edema    Skin:     General: Skin is warm. Capillary Refill: Capillary refill takes less than 2 seconds. Coloration: Skin is not jaundiced. Neurological:      General: No focal deficit present. Mental Status: Alert and oriented to person, place, and time. Mental status is at baseline. Motor: No weakness.        DIFFERENTIAL  DIAGNOSIS     PLAN (LABS / IMAGING / EKG):  Orders Placed This Encounter   Procedures    CBC Auto Differential    Comprehensive Metabolic Panel w/ Reflex to MG    Lipase    Urinalysis Reflex to Culture    D-Dimer, Quantitative    Troponin    Brain Natriuretic Peptide    Hemoglobin and Hematocrit, Blood, Post Transfusion    VITAL SIGNS PER TRANSFUSION PROTOCOL    Verify hospital blood consent form has been signed and witnessed   [address] Inpatient consult to Internal Medicine    EKG 12 Lead    TYPE AND SCREEN    PREPARE RBC (CROSSMATCH), 2 Units    Insert peripheral IV    PATIENT STATUS (FROM ED OR OR/PROCEDURAL) Inpatient       MEDICATIONS ORDERED:  Orders Placed This Encounter   Medications    0.9 % sodium chloride bolus    fentaNYL (SUBLIMAZE) injection 50 mcg    0.9 % sodium chloride infusion           DIAGNOSTIC RESULTS / EMERGENCY DEPARTMENT COURSE / MDM     LABS:  Results for orders placed or performed during the hospital encounter of 10/28/21   CBC Auto Differential   Result Value Ref Range    WBC 10.5 3.5 - 11.3 k/uL    RBC 2.29 (L) 3.95 - 5.11 m/uL    Hemoglobin 4.3 (LL) 11.9 - 15.1 g/dL    Hematocrit 16.8 (L) 36.3 - 47.1 %    MCV 73.4 (L) 82.6 - 102.9 fL    MCH 18.8 (L) 25.2 - 33.5 pg    MCHC 25.6 (L) 28.4 - 34.8 g/dL    RDW 22.7 (H) 11.8 - 14.4 %    Platelets 248 (H) 258 - 453 k/uL    MPV 8.9 8.1 - 13.5 fL    NRBC Automated 0.3 (H) 0.0 per 100 WBC    Differential Type NOT REPORTED     WBC Morphology NOT REPORTED     RBC Morphology NOT REPORTED     Platelet Estimate NOT REPORTED     Immature Granulocytes 0 0 %    Seg Neutrophils 76 (H) 36 - 66 %    Lymphocytes 12 (L) 24 - 44 %    Monocytes 10 (H) 1 - 7 %    Eosinophils % 2 1 - 4 %    Basophils 0 0 - 2 %    Absolute Immature Granulocyte 0.00 0.00 - 0.30 k/uL    Segs Absolute 7.98 (H) 1.8 - 7.7 k/uL    Absolute Lymph # 1.26 1.0 - 4.8 k/uL    Absolute Mono # 1.05 (H) 0.1 - 0.8 k/uL    Absolute Eos # 0.21 0.0 - 0.4 k/uL    Basophils Absolute 0.00 0.0 - 0.2 k/uL    Morphology ANISOCYTOSIS PRESENT     Morphology MICROCYTOSIS PRESENT     Morphology HYPOCHROMIA PRESENT     Morphology 1+ Screen NOT TESTED     Antibody ID Anti-[place] Present     AMBER IgG NEGATIVE     Unit Number [id number]     Product Code Leukocyte Reduced Red Cell     Unit Divison 00     Dispense Status ISSUED     Transfusion Status OK TO TRANSFUSE     Crossmatch Result COMPATIBLE     Unit Number [id number]     Product Code Leukocyte Reduced Red Cell     Unit Divison 00     Dispense Status ALLOCATED     Transfusion Status OK TO TRANSFUSE     Crossmatch Result COMPATIBLE          RADIOLOGY:  None    EKG  None    All EKG's are interpreted by the Emergency Department Physician who either signs or Co-signs this chart in the absence of a cardiologist.    EMERGENCY DEPARTMENT COURSE:  Patient breathing quietly and unlabored on room air. Speech is normal and speaking in full sentences without requiring to pause to take a breath. Physical exam largely unremarkable no abdominal tenderness to palpation rectal exam was positive for Hemoccult stool. Stool was firm and well formed no gross blood but microscopically positive. External nonbleeding hemorrhoid. Vital signs stable afebrile. Concern for constipation, worsening metastasis, anemia given past medical history of anemia requiring blood transfusion. Will do abdominal labs, troponin, lipase, D-dimer rule out PE given 15 subjective shortness of breath. , pain control, IV fluids    Troponin VII, D-dimer negative hemoglobin is 4, will do cross and match prepared for transfusion. Urinalysis negative. Will admit to internal medicine for anemia. Bedside FAST exam negative for free fluid    PROCEDURES:  None    CONSULTS:  IP CONSULT TO INTERNAL MEDICINE    CRITICAL CARE:  None    FINAL IMPRESSION      1. Anemia, unspecified type    2. Abdominal pain, unspecified abdominal location    3. Constipation, unspecified constipation type          DISPOSITION / PLAN     DISPOSITION Admitted 10/28/2021 01:41:25 PM      PATIENT REFERRED TO:  No follow-up provider specified.     DISCHARGE MEDICATIONS:  New Prescriptions    No medications on file       Cristiane Hebert MD  Emergency Medicine Resident    (Please note that portions of thisnote were completed with a voice recognition program.  Efforts were made to edit the dictations but occasionally words are mis-transcribed.)       Cristiane Hebert MD  Resident  10/28/21 [id number]

## 2021-10-28 NOTE — ED NOTES
Attempted to call report to 4B RN. Waqas BOBO RN, she was the only RN at the nurses station and writer could be placed on hold or call back. Writer to call back.      Traci Perez RN  10/28/21 [id number]

## 2021-10-29 ENCOUNTER — APPOINTMENT (OUTPATIENT)
Dept: CT IMAGING | Age: 56
DRG: 755 | End: 2021-10-29
Payer: COMMERCIAL

## 2021-10-29 LAB
ABSOLUTE EOS #: 0.49 K/UL (ref 0–0.4)
ABSOLUTE IMMATURE GRANULOCYTE: 0.12 K/UL (ref 0–0.3)
ABSOLUTE LYMPH #: 1.46 K/UL (ref 1–4.8)
ABSOLUTE MONO #: 1.1 K/UL (ref 0.1–0.8)
ALBUMIN SERPL-MCNC: 3.3 G/DL (ref 3.5–5.2)
ALBUMIN/GLOBULIN RATIO: 1.6 (ref 1–2.5)
ALP BLD-CCNC: 61 U/L (ref 35–104)
ALT SERPL-CCNC: <5 U/L (ref 5–33)
ANION GAP SERPL CALCULATED.3IONS-SCNC: 13 MMOL/L (ref 9–17)
AST SERPL-CCNC: 10 U/L
BASOPHILS # BLD: 0 % (ref 0–2)
BASOPHILS ABSOLUTE: 0 K/UL (ref 0–0.2)
BILIRUB SERPL-MCNC: 0.39 MG/DL (ref 0.3–1.2)
BUN BLDV-MCNC: 9 MG/DL (ref 6–20)
BUN/CREAT BLD: ABNORMAL (ref 9–20)
CALCIUM SERPL-MCNC: 8 MG/DL (ref 8.6–10.4)
CHLORIDE BLD-SCNC: 104 MMOL/L (ref 98–107)
CO2: 20 MMOL/L (ref 20–31)
CREAT SERPL-MCNC: 0.4 MG/DL (ref 0.5–0.9)
DIFFERENTIAL TYPE: ABNORMAL
EKG ATRIAL RATE: 97 BPM
EKG P AXIS: 58 DEGREES
EKG P-R INTERVAL: 160 MS
EKG Q-T INTERVAL: 380 MS
EKG QRS DURATION: 86 MS
EKG QTC CALCULATION (BAZETT): 482 MS
EKG T AXIS: 57 DEGREES
EKG VENTRICULAR RATE: 97 BPM
EOSINOPHILS RELATIVE PERCENT: 4 % (ref 1–4)
GFR AFRICAN AMERICAN: >60 ML/MIN
GFR NON-AFRICAN AMERICAN: >60 ML/MIN
GFR SERPL CREATININE-BSD FRML MDRD: ABNORMAL ML/MIN/{1.73_M2}
GFR SERPL CREATININE-BSD FRML MDRD: ABNORMAL ML/MIN/{1.73_M2}
GLUCOSE BLD-MCNC: 107 MG/DL (ref 70–99)
HCT VFR BLD CALC: 24.4 % (ref 36.3–47.1)
HCT VFR BLD CALC: 28.2 % (ref 36.3–47.1)
HCT VFR BLD CALC: 28.5 % (ref 36.3–47.1)
HCT VFR BLD CALC: 30.5 % (ref 36.3–47.1)
HEMOGLOBIN: 6.8 G/DL (ref 11.9–15.1)
HEMOGLOBIN: 8.1 G/DL (ref 11.9–15.1)
HEMOGLOBIN: 8.3 G/DL (ref 11.9–15.1)
HEMOGLOBIN: 8.3 G/DL (ref 11.9–15.1)
IMMATURE GRANULOCYTES: 1 %
LYMPHOCYTES # BLD: 12 % (ref 24–44)
MAGNESIUM: 1.9 MG/DL (ref 1.6–2.6)
MCH RBC QN AUTO: 22.6 PG (ref 25.2–33.5)
MCHC RBC AUTO-ENTMCNC: 28.7 G/DL (ref 28.4–34.8)
MCV RBC AUTO: 78.6 FL (ref 82.6–102.9)
MONOCYTES # BLD: 9 % (ref 1–7)
MORPHOLOGY: ABNORMAL
NRBC AUTOMATED: 0.8 PER 100 WBC
NUCLEATED RED BLOOD CELLS: 1 PER 100 WBC
PDW BLD-RTO: 22.6 % (ref 11.8–14.4)
PLATELET # BLD: 609 K/UL (ref 138–453)
PLATELET ESTIMATE: ABNORMAL
PMV BLD AUTO: 8.7 FL (ref 8.1–13.5)
POTASSIUM SERPL-SCNC: 3.5 MMOL/L (ref 3.7–5.3)
RBC # BLD: 3.59 M/UL (ref 3.95–5.11)
RBC # BLD: ABNORMAL 10*6/UL
SEG NEUTROPHILS: 74 % (ref 36–66)
SEGMENTED NEUTROPHILS ABSOLUTE COUNT: 9.03 K/UL (ref 1.8–7.7)
SODIUM BLD-SCNC: 137 MMOL/L (ref 135–144)
TOTAL PROTEIN: 5.4 G/DL (ref 6.4–8.3)
WBC # BLD: 12.2 K/UL (ref 3.5–11.3)
WBC # BLD: ABNORMAL 10*3/UL

## 2021-10-29 PROCEDURE — 86902 BLOOD TYPE ANTIGEN DONOR EA: CPT

## 2021-10-29 PROCEDURE — 86921 COMPATIBILITY TEST INCUBATE: CPT

## 2021-10-29 PROCEDURE — 83735 ASSAY OF MAGNESIUM: CPT

## 2021-10-29 PROCEDURE — 86922 COMPATIBILITY TEST ANTIGLOB: CPT

## 2021-10-29 PROCEDURE — 36430 TRANSFUSION BLD/BLD COMPNT: CPT

## 2021-10-29 PROCEDURE — 80053 COMPREHEN METABOLIC PANEL: CPT

## 2021-10-29 PROCEDURE — 6370000000 HC RX 637 (ALT 250 FOR IP): Performed by: NURSE PRACTITIONER

## 2021-10-29 PROCEDURE — 85014 HEMATOCRIT: CPT

## 2021-10-29 PROCEDURE — 74177 CT ABD & PELVIS W/CONTRAST: CPT

## 2021-10-29 PROCEDURE — 2700000000 HC OXYGEN THERAPY PER DAY

## 2021-10-29 PROCEDURE — 85025 COMPLETE CBC W/AUTO DIFF WBC: CPT

## 2021-10-29 PROCEDURE — 2060000000 HC ICU INTERMEDIATE R&B

## 2021-10-29 PROCEDURE — G0378 HOSPITAL OBSERVATION PER HR: HCPCS

## 2021-10-29 PROCEDURE — 6360000002 HC RX W HCPCS: Performed by: STUDENT IN AN ORGANIZED HEALTH CARE EDUCATION/TRAINING PROGRAM

## 2021-10-29 PROCEDURE — 96372 THER/PROPH/DIAG INJ SC/IM: CPT

## 2021-10-29 PROCEDURE — 6360000002 HC RX W HCPCS

## 2021-10-29 PROCEDURE — 36415 COLL VENOUS BLD VENIPUNCTURE: CPT

## 2021-10-29 PROCEDURE — 6370000000 HC RX 637 (ALT 250 FOR IP)

## 2021-10-29 PROCEDURE — 94761 N-INVAS EAR/PLS OXIMETRY MLT: CPT

## 2021-10-29 PROCEDURE — 2580000003 HC RX 258

## 2021-10-29 PROCEDURE — 2580000003 HC RX 258: Performed by: STUDENT IN AN ORGANIZED HEALTH CARE EDUCATION/TRAINING PROGRAM

## 2021-10-29 PROCEDURE — 86900 BLOOD TYPING SEROLOGIC ABO: CPT

## 2021-10-29 PROCEDURE — P9016 RBC LEUKOCYTES REDUCED: HCPCS

## 2021-10-29 PROCEDURE — 6370000000 HC RX 637 (ALT 250 FOR IP): Performed by: STUDENT IN AN ORGANIZED HEALTH CARE EDUCATION/TRAINING PROGRAM

## 2021-10-29 PROCEDURE — 96376 TX/PRO/DX INJ SAME DRUG ADON: CPT

## 2021-10-29 PROCEDURE — 99254 IP/OBS CNSLTJ NEW/EST MOD 60: CPT | Performed by: INTERNAL MEDICINE

## 2021-10-29 PROCEDURE — 96366 THER/PROPH/DIAG IV INF ADDON: CPT

## 2021-10-29 PROCEDURE — 6360000004 HC RX CONTRAST MEDICATION: Performed by: INTERNAL MEDICINE

## 2021-10-29 PROCEDURE — 6360000004 HC RX CONTRAST MEDICATION: Performed by: STUDENT IN AN ORGANIZED HEALTH CARE EDUCATION/TRAINING PROGRAM

## 2021-10-29 PROCEDURE — 99232 SBSQ HOSP IP/OBS MODERATE 35: CPT | Performed by: INTERNAL MEDICINE

## 2021-10-29 PROCEDURE — 96375 TX/PRO/DX INJ NEW DRUG ADDON: CPT

## 2021-10-29 PROCEDURE — 85018 HEMOGLOBIN: CPT

## 2021-10-29 PROCEDURE — C9113 INJ PANTOPRAZOLE SODIUM, VIA: HCPCS

## 2021-10-29 RX ORDER — FENTANYL CITRATE 50 UG/ML
25 INJECTION, SOLUTION INTRAMUSCULAR; INTRAVENOUS
Status: DISCONTINUED | OUTPATIENT
Start: 2021-10-29 | End: 2021-11-01 | Stop reason: HOSPADM

## 2021-10-29 RX ORDER — SODIUM CHLORIDE 9 MG/ML
INJECTION, SOLUTION INTRAVENOUS PRN
Status: DISCONTINUED | OUTPATIENT
Start: 2021-10-29 | End: 2021-11-01 | Stop reason: HOSPADM

## 2021-10-29 RX ORDER — OXYCODONE HYDROCHLORIDE AND ACETAMINOPHEN 5; 325 MG/1; MG/1
1 TABLET ORAL EVERY 4 HOURS PRN
Status: DISCONTINUED | OUTPATIENT
Start: 2021-10-29 | End: 2021-11-01 | Stop reason: HOSPADM

## 2021-10-29 RX ORDER — MORPHINE SULFATE 15 MG/1
30 TABLET, FILM COATED, EXTENDED RELEASE ORAL EVERY 12 HOURS SCHEDULED
Status: DISCONTINUED | OUTPATIENT
Start: 2021-10-29 | End: 2021-10-29

## 2021-10-29 RX ORDER — MORPHINE SULFATE 15 MG/1
30 TABLET ORAL 2 TIMES DAILY PRN
Status: DISCONTINUED | OUTPATIENT
Start: 2021-10-29 | End: 2021-10-29

## 2021-10-29 RX ORDER — POTASSIUM CHLORIDE 20 MEQ/1
20 TABLET, EXTENDED RELEASE ORAL ONCE
Status: COMPLETED | OUTPATIENT
Start: 2021-10-29 | End: 2021-10-29

## 2021-10-29 RX ORDER — FENTANYL CITRATE 50 UG/ML
50 INJECTION, SOLUTION INTRAMUSCULAR; INTRAVENOUS
Status: DISCONTINUED | OUTPATIENT
Start: 2021-10-29 | End: 2021-11-01 | Stop reason: HOSPADM

## 2021-10-29 RX ORDER — LORAZEPAM 2 MG/ML
0.5 INJECTION INTRAMUSCULAR ONCE
Status: COMPLETED | OUTPATIENT
Start: 2021-10-29 | End: 2021-10-29

## 2021-10-29 RX ORDER — MORPHINE SULFATE 15 MG/1
30 TABLET, FILM COATED, EXTENDED RELEASE ORAL EVERY 12 HOURS SCHEDULED
Status: DISCONTINUED | OUTPATIENT
Start: 2021-10-29 | End: 2021-11-01 | Stop reason: HOSPADM

## 2021-10-29 RX ORDER — BISACODYL 10 MG
10 SUPPOSITORY, RECTAL RECTAL DAILY PRN
Status: DISCONTINUED | OUTPATIENT
Start: 2021-10-29 | End: 2021-11-01 | Stop reason: HOSPADM

## 2021-10-29 RX ADMIN — FENTANYL CITRATE 50 MCG: 50 INJECTION, SOLUTION INTRAMUSCULAR; INTRAVENOUS at 22:56

## 2021-10-29 RX ADMIN — OXYCODONE HYDROCHLORIDE AND ACETAMINOPHEN 1 TABLET: 5; 325 TABLET ORAL at 05:09

## 2021-10-29 RX ADMIN — OXYCODONE HYDROCHLORIDE AND ACETAMINOPHEN 2 TABLET: 5; 325 TABLET ORAL at 01:05

## 2021-10-29 RX ADMIN — LAMOTRIGINE 75 MG: 25 TABLET ORAL at 20:01

## 2021-10-29 RX ADMIN — IOPAMIDOL 100 ML: 755 INJECTION, SOLUTION INTRAVENOUS at 15:32

## 2021-10-29 RX ADMIN — SENNOSIDES 17.2 MG: 8.6 TABLET, FILM COATED ORAL at 20:00

## 2021-10-29 RX ADMIN — LORAZEPAM 0.5 MG: 2 INJECTION INTRAMUSCULAR; INTRAVENOUS at 21:54

## 2021-10-29 RX ADMIN — ONDANSETRON 4 MG: 2 INJECTION INTRAMUSCULAR; INTRAVENOUS at 06:20

## 2021-10-29 RX ADMIN — LEVETIRACETAM 1500 MG: 500 TABLET, FILM COATED ORAL at 08:47

## 2021-10-29 RX ADMIN — SODIUM CHLORIDE, PRESERVATIVE FREE 10 ML: 5 INJECTION INTRAVENOUS at 08:43

## 2021-10-29 RX ADMIN — ONDANSETRON 4 MG: 2 INJECTION INTRAMUSCULAR; INTRAVENOUS at 11:43

## 2021-10-29 RX ADMIN — BISACODYL 10 MG: 10 SUPPOSITORY RECTAL at 08:47

## 2021-10-29 RX ADMIN — OXYCODONE HYDROCHLORIDE AND ACETAMINOPHEN 1 TABLET: 5; 325 TABLET ORAL at 21:55

## 2021-10-29 RX ADMIN — ENOXAPARIN SODIUM 70 MG: 80 INJECTION SUBCUTANEOUS at 08:48

## 2021-10-29 RX ADMIN — LAMOTRIGINE 75 MG: 25 TABLET ORAL at 08:48

## 2021-10-29 RX ADMIN — SODIUM CHLORIDE, PRESERVATIVE FREE 10 ML: 5 INJECTION INTRAVENOUS at 08:47

## 2021-10-29 RX ADMIN — ENOXAPARIN SODIUM 70 MG: 80 INJECTION SUBCUTANEOUS at 20:01

## 2021-10-29 RX ADMIN — PANTOPRAZOLE SODIUM 40 MG: 40 INJECTION, POWDER, FOR SOLUTION INTRAVENOUS at 08:47

## 2021-10-29 RX ADMIN — FENTANYL CITRATE 50 MCG: 50 INJECTION, SOLUTION INTRAMUSCULAR; INTRAVENOUS at 17:53

## 2021-10-29 RX ADMIN — FENTANYL CITRATE 50 MCG: 50 INJECTION, SOLUTION INTRAMUSCULAR; INTRAVENOUS at 20:00

## 2021-10-29 RX ADMIN — POLYETHYLENE GLYCOL 3350 17 G: 17 POWDER, FOR SOLUTION ORAL at 08:47

## 2021-10-29 RX ADMIN — POTASSIUM CHLORIDE 20 MEQ: 1500 TABLET, EXTENDED RELEASE ORAL at 11:55

## 2021-10-29 RX ADMIN — MORPHINE SULFATE 30 MG: 15 TABLET, EXTENDED RELEASE ORAL at 20:00

## 2021-10-29 RX ADMIN — OXYCODONE HYDROCHLORIDE AND ACETAMINOPHEN 1 TABLET: 5; 325 TABLET ORAL at 15:53

## 2021-10-29 RX ADMIN — FENTANYL CITRATE 50 MCG: 50 INJECTION, SOLUTION INTRAMUSCULAR; INTRAVENOUS at 15:50

## 2021-10-29 RX ADMIN — IOHEXOL 50 ML: 240 INJECTION, SOLUTION INTRATHECAL; INTRAVASCULAR; INTRAVENOUS; ORAL at 13:18

## 2021-10-29 RX ADMIN — MORPHINE SULFATE 30 MG: 15 TABLET, EXTENDED RELEASE ORAL at 03:11

## 2021-10-29 RX ADMIN — OXYCODONE HYDROCHLORIDE AND ACETAMINOPHEN 1 TABLET: 5; 325 TABLET ORAL at 09:45

## 2021-10-29 RX ADMIN — IRON SUCROSE 300 MG: 20 INJECTION, SOLUTION INTRAVENOUS at 18:40

## 2021-10-29 RX ADMIN — FENTANYL CITRATE 50 MCG: 50 INJECTION, SOLUTION INTRAMUSCULAR; INTRAVENOUS at 13:18

## 2021-10-29 RX ADMIN — LEVETIRACETAM 1500 MG: 500 TABLET, FILM COATED ORAL at 20:00

## 2021-10-29 ASSESSMENT — PAIN SCALES - GENERAL
PAINLEVEL_OUTOF10: 8
PAINLEVEL_OUTOF10: 6
PAINLEVEL_OUTOF10: 7
PAINLEVEL_OUTOF10: 1
PAINLEVEL_OUTOF10: 10
PAINLEVEL_OUTOF10: 10
PAINLEVEL_OUTOF10: 7
PAINLEVEL_OUTOF10: 9
PAINLEVEL_OUTOF10: 10
PAINLEVEL_OUTOF10: 4
PAINLEVEL_OUTOF10: 10
PAINLEVEL_OUTOF10: 2
PAINLEVEL_OUTOF10: 6
PAINLEVEL_OUTOF10: 10
PAINLEVEL_OUTOF10: 10
PAINLEVEL_OUTOF10: 1
PAINLEVEL_OUTOF10: 7
PAINLEVEL_OUTOF10: 8

## 2021-10-29 ASSESSMENT — PAIN DESCRIPTION - PROGRESSION

## 2021-10-29 ASSESSMENT — PAIN DESCRIPTION - LOCATION: LOCATION: BACK

## 2021-10-29 NOTE — PLAN OF CARE
Admitted for acute on chronic anemia likely secondary to ovarian carcinoma. Admission hemoglobin 4.3 s/p 2 units PRBC transfusion increased to 7.1. Early morning hemoglobin 6.81 more unit of PRBC ordered. Monitor has been consulted. Patient received Lovenox 1 mg/kg twice daily therapeutic dose. Eliquis kept on hold. Will ask heme-onc for anticoagulation recommendation. Patient has Hemoccult positive on stool, no signs of overt bleeding we will continue to monitor might consider GI evaluation if she has any overt bleeding later on. Was requesting her home dose of morphine. Patient is scheduled MS Contin 30 mg every 12 hours we will continue to monitor. Lab work-up this morning is pending    Rene Ramos MD  Internal Medicine Resident, PGY- [facility];  [place], [place]  10/29/2021, 6:45 AM

## 2021-10-29 NOTE — CONSULTS
_        Today's Date: 10/29/2021  Patient Name: Yvette Lombardo  Date of admission: 10/28/2021 10:54 AM  Patient's age: 60 y.o., 1965  Admission Dx: Anemia [D64.9]  Abdominal pain, unspecified abdominal location [R10.9]  Constipation, unspecified constipation type [K59.00]  Anemia, unspecified type [D64.9]      Requesting Physician: Jose Guadalupe Araujo DO    CHIEF COMPLAINT: Abdominal pain, fatigue    History Obtained From:  patient, electronic medical record    HISTORY OF PRESENT ILLNESS:      The patient is a 60 y.o. female with PMH of  -Recurrent metastatic ovarian carcinoma with intra-abdominal and peritoneal mets-on active chemotherapy  -Acute GI bleed s/p vascular embolization  -DVT-on Eliquis  -Chronic anemia. Who is admitted to the hospital for acute on chronic anemia. On admission, Hb was 4.3 s/p 2 units PRBC transfusion. Hb this morning 6.8, one 1 unit PRBC transfused. Patient is on chronic Eliquis for history of DVT. Hemoccult positive. No overt signs of bleeding. Has history of acute severe GI bleed requiring vascular embolization. 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 Underwent surgical debulking and was placed on systemic therapy with Taxol and carboplatin for 6 cycles. She was in remission for about 2 years and had a relapse of disease in 2007. Was treated with topotecan with good results. Patient relapsed again in 2008 and was placed on Taxol and carboplatin. After 3 cycles of systemic chemotherapy with Taxol and carboplatin,  she could not tolerate carboplatin and finished the therapy with 3 more cycles of Taxol. She did well again for 2 to 3 years until she had another relapse in 2012 and she had intraperitoneal chemotherapy treatment with Taxol for which responded well. She lost follow-up with her oncologist after 2014 and moved to [place].   She Historical Provider, MD   lamoTRIgine (LAMICTAL) 25 MG tablet TAKE 3 TABS IN IN THE MORNING AND 3 TABS IN IN THE EVENING 10/5/21  Yes Samer Eustaquio Closs, MD   sertraline (ZOLOFT) 100 MG tablet TAKE 1 TABLET BY MOUTH DAILY 10/4/21 11/3/21 Yes Darling Bhatia MD   levETIRAcetam (KEPPRA) 750 MG tablet Take 2 tablets by mouth 2 times daily 7/12/21  Yes Andrew Estrada MD   ELIQUIS 5 MG TABS tablet Take 5 mg by mouth 2 times daily  5/26/21  Yes Historical Provider, MD   oxyCODONE-acetaminophen (PERCOCET) 5-325 MG per tablet TAKE 1 TABLET BY MOUTH EVERY 4 HOURS AS NEEDED FOR PAIN (BREAKTHROUGH PAIN) - REDUCE DOSES TAKEN AS PAIN BECOMES MANAGEABLE 10/25/21 11/24/21  Frida Baldwin MD   dicyclomine (BENTYL) 20 MG tablet Take 1 tablet by mouth 3 times daily as needed (cramps)  Patient not taking: Reported on 9/17/2021 8/27/21   Frida Baldwin MD   ondansetron (ZOFRAN-ODT) 4 MG disintegrating tablet Take 1 tablet by mouth every 8 hours as needed for Nausea or Vomiting 6/23/21   Lluvia Almanzar MD   pantoprazole (PROTONIX) 40 MG tablet Take 1 tablet by mouth 2 times daily  Patient not taking: Reported on 7/9/2021 5/10/21   Frida Baldwin MD   ferrous sulfate (FE TABS 325) 325 (65 Fe) MG EC tablet Take 1 tablet by mouth daily (with breakfast)  Patient not taking: Reported on 7/9/2021 3/12/21   ESTRELLITA Yao NP   aspirin 81 MG chewable tablet Take 81 mg by mouth nightly Pt not taking    Historical Provider, MD     Current Facility-Administered Medications   Medication Dose Route Frequency Provider Last Rate Last Admin    oxyCODONE-acetaminophen (PERCOCET) 5-325 MG per tablet 1 tablet  1 tablet Oral Q4H PRN Nick Hair MD   1 tablet at 10/29/21 0945    morphine (MS CONTIN) extended release tablet 30 mg  30 mg Oral 2 times per day Nick Hair MD   30 mg at 10/29/21 0311    0.9 % sodium chloride infusion   IntraVENous PRN Maria Teresa Seay MD        bisacodyl (DULCOLAX) suppository 10 mg 10 mg Rectal Daily PRN Maria Teresa Steel MD   10 mg at 10/29/21 0847    0.9 % sodium chloride infusion   IntraVENous PRN Peri Wilhelm MD        sodium chloride flush 0.9 % injection 5-40 mL  5-40 mL IntraVENous 2 times per day Peri Wilhelm MD   10 mL at 10/29/21 0843    sodium chloride flush 0.9 % injection 5-40 mL  5-40 mL IntraVENous PRN Peri Wilhelm MD        0.9 % sodium chloride infusion  25 mL IntraVENous PRN Peri Wilhelm MD        ondansetron (ZOFRAN-ODT) disintegrating tablet 4 mg  4 mg Oral Q8H PRN Peri Wilhelm MD        Or    ondansetron (ZOFRAN) injection 4 mg  4 mg IntraVENous Q6H PRN Peri Wilhelm MD   4 mg at 10/29/21 1143    polyethylene glycol (GLYCOLAX) packet 17 g  17 g Oral Daily PRN Peri Wilhelm MD   17 g at 10/29/21 0847    acetaminophen (TYLENOL) tablet 650 mg  650 mg Oral Q6H PRN Peri Wilhelm MD        Or    acetaminophen (TYLENOL) suppository 650 mg  650 mg Rectal Q6H PRN Peri Wilhelm MD        pantoprazole (PROTONIX) injection 40 mg  40 mg IntraVENous Daily Peri Wilhelm MD   40 mg at 10/29/21 0847    And    sodium chloride (PF) 0.9 % injection 10 mL  10 mL IntraVENous Daily Peri Wilhelm MD   10 mL at 10/29/21 0847    levETIRAcetam (KEPPRA) tablet 1,500 mg  1,500 mg Oral BID Maria Teresa Steel MD   1,500 mg at 10/29/21 0847    lamoTRIgine (LAMICTAL) tablet 75 mg  75 mg Oral BID Maria Teresa Steel MD   75 mg at 10/29/21 0848    enoxaparin (LOVENOX) injection 70 mg  1 mg/kg SubCUTAneous BID Maria Teresa Steel MD   70 mg at 10/29/21 0848    senna (SENOKOT) tablet 17.2 mg  2 tablet Oral Nightly Pepitoliset Yan, APRN - CNP   17.2 mg at 10/28/21 2330       Allergies:  Ceftriaxone    REVIEW OF SYSTEMS:      · General: No weakness or fatigue. No unanticipated weight loss or decreased appetite. No fever or chills. · Eyes: No blurred vision, eye pain or double vision.    · Ears: No hearing problems or drainage. No tinnitus. · Throat: No sore throat, problems with swallowing or dysphagia. · Respiratory: No cough, sputum or hemoptysis. No shortness of breath. No pleuritic chest pain. · Cardiovascular: No chest pain, orthopnea or PND. No lower extremity edema. No palpitation. · Gastrointestinal: No problems with swallowing. No abdominal pain or bloating. No nausea or vomiting. No diarrhea or constipation. No GI bleeding. · Genitourinary: No dysuria, hematuria, frequency or urgency. · Musculoskeletal: No muscle aches or pains. No limitation of movement. No back pain. No gait disturbance, No joint complaints. · Dermatologic: No skin rashes or pruritus. No skin lesions or discolorations. · Psychiatric: No depression, anxiety, or stress or signs of schizophrenia. No change in mood or affect. · Hematologic: No history of bleeding tendency. No bruises or ecchymosis. No history of clotting problems. · Infectious disease: No fever, chills or frequent infections. · Endocrine: No polydipsia or polyuria. No temperature intolerance. · Neurologic: No headaches or dizziness. No weakness or numbness of the extremities. No changes in balance, coordination,  memory, mentation, behavior. · Allergic/Immunologic: No nasal congestion or hives. No repeated infections.        PHYSICAL EXAM:      /64   Pulse 89   Temp 98.2 °F (36.8 °C)   Resp 16   Ht 5' 5\" (1.651 m)   Wt 163 lb 5.8 oz (74.1 kg)   SpO2 98%   BMI 27.18 kg/m²    Temp (24hrs), Av.5 °F (36.9 °C), Min:97.3 °F (36.3 °C), Max:99.4 °F (37.4 °C)      General appearance - not in pain or distress  Mental status - alert and oriented  Eyes - pupils equal and reactive, extraocular eye movements intact  Ears - bilateral TM's and external ear canals normal  Nose - normal and patent, no erythema, discharge or polyps  Mouth - mucous membranes moist, pharynx normal without lesions  Neck - supple, no significant adenopathy  Lymphatics - no palpable lymphadenopathy, no hepatosplenomegaly  Chest - clear to auscultation, no wheezes, rales or rhonchi, symmetric air entry  Heart - normal rate, regular rhythm, normal S1, S2, no murmurs, rubs, clicks or gallops  Abdomen - soft, nontender, nondistended, no masses or organomegaly  Neurological - alert, oriented, normal speech, no focal findings or movement disorder noted  Musculoskeletal - no joint tenderness, deformity or swelling  Extremities - peripheral pulses normal, no pedal edema, no clubbing or cyanosis  Skin - normal coloration and turgor, no rashes, no suspicious skin lesions noted           DATA:      Labs:       CBC:   Recent Labs     10/28/21  1228 10/28/21  1707 10/29/21  0425 10/29/21  0927   WBC 10.5  --   --  12.2*   HGB 4.3*   < > 6.8* 8.1*   HCT 16.8*   < > 24.4* 28.2*   *  --   --  609*    < > = values in this interval not displayed. BMP:   Recent Labs     10/28/21  1228 10/29/21  0927    137   K 3.9 3.5*   CO2 23 20   BUN 13 9   CREATININE 0.43* 0.40*   LABGLOM >60 >60   GLUCOSE 96 107*     PT/INR: No results for input(s): PROTIME, INR in the last 72 hours. APTT:No results for input(s): APTT in the last 72 hours.   LIVER PROFILE:  Recent Labs     10/28/21  1228 10/29/21  0927   AST 7 10   ALT <5* <5*   LABALBU 3.4* 3.3*     ECHO Complete 2D W Doppler W Color  Transthoracic Echocardiography Report (TTE)     Patient Name Celia Mahajan      Date of Study               06/22/2021                [address] [place] [address]      Date of      1965  Gender                      Female   Birth      Age          60 year(s)  Race                              Room Number  0543        Height:                     62 inch, 157.48 cm      Corporate ID [id number]    Weight:                     163 pounds, 73.9 kg   #      Patient Acct [de-identified]   BSA:          1.75 m^2      BMI:      29.81   #                                                              kg/m^2      MR #         [id number]     Sonographer Piero Braggler      Accession #  [id number]  Interpreting Physician      Abilio Ta      Fellow                   Referring Nurse                            Practitioner      Interpreting             Referring Physician         Ruslan Munguia   Fellow     Type of Study      TTE procedure:2D Echocardiogram, M-Mode, Doppler, Color Doppler. Procedure Date  Date: 06/22/2021 Start: 02:52 PM    Study Location: [facility]  Technical Quality: Adequate visualization    Indications:Elevated troponin. History / Tech. Comments:  Procedure explained to patient. Smoker  Ovarian Cancer, metastatic to bone  DM    Patient Status: Inpatient    Height: 62 inches Weight: 163 pounds BSA: 1.75 m^2 BMI: 29.81 kg/m^2    HR: 96 bpm    CONCLUSIONS    Summary  Normal LV size , mildly increased wall thickness. No obvious wall motion abnormality seen. Normal LV systolic function with LVEF >55%. Normal RV size and function. LA appears dilated. RA appears normal in size. No obvious significant structural valvular abnormality noted. No significant valvular stenosis or regurgitation noted. Normal aortic root dimension. Moderate posterior pericardial effusion noted. IVC appears dilated, impaired inspiratory variation indicating elevated RA  filling pressure. Signature  ----------------------------------------------------------------------------   Electronically signed by Abilio Ta(Interpreting physician) on   06/23/2021 12:33 PM  ----------------------------------------------------------------------------    ----------------------------------------------------------------------------   Electronically signed by Soha Santana(Sonographer) on 06/22/2021 05:30 PM  ----------------------------------------------------------------------------  FINDINGS  Left Atrium  Left atrial dilatation. Inter-atrial septum is intact with no evidence for an atrial septal defect.   Left Ventricle  Left ventricle is normal Area (continuity): 1.95 cm^2   P1/2t: 49 msec                          AV VTI: 40.2 cm      Area (continuity): 3.29 cm^2   Mean Velocity: 0.89 m/s      Tricuspid:                              Pulmonic:      Peak TR Velocity: 1.91 m/s              Peak Velocity: 1.30 m/s   Peak TR Gradient: 14.5924 mmHg          Peak Gradient: 6.76 mmHg     Diastology / Tissue Doppler  Septal Wall E' velocity:0.06 m/s  Septal Wall E/E':18.4  Lateral Wall E' velocity:0.09 m/s  Lateral Wall E/E':11.2            IMPRESSION:    Primary Problem  Acute on chronic anemia    Active Hospital Problems    Diagnosis Date Noted    Acute on chronic anemia [D64.9] 10/28/2021    History of deep venous thrombosis (DVT) of distal vein of left lower extremity: On Eliquis [Z86.718] 06/20/2021    Anemia [D64.9]     Seizure ([facility] [address] [R56.9] 10/21/2020    Malignant neoplasm of ovary ([facility] [address] [C56.9] 08/17/2020       ASSESSMENT:  1. Acute on chronic severe iron deficiency anemia likely secondary to chronic blood loss  2. Recurrent metastatic ovarian carcinoma with extensive intra-abdominal and peritoneal mets-on active chemotherapy  3. History of acute GI bleed from abdominal mass near splenic area s/p vascular embolization  4. History of DVT-on Eliquis      RECOMMENDATIONS:  1. Medical records, labs and imaging reviewed and discussed with the patient. 2. S/p 3 units PRBC transfusion. Hemoglobin 8.2. Transfuse to keep Hb > 7.  3. Patient has severe iron deficiency. S/p 1 dose of IV iron infusion. Will order 2 more doses of  mg iron infusion. 4. CT abdomen pelvis reviewed, showed diffuse distention of colon without evidence of SBO. Patient will benefit from GI evaluation. 5. CT showed very mild increase in the size of the pulmonary nodule. Will get dedicated CT chest as outpatient. 6. Continue to hold anticoagulation until active bleed is ruled out. 7. We will follow with you.   8. Final recommendations after discussion with attending,  [place]. Discussed with patient and Nurse. Deshaun Sultana MD  Internal Medicine Resident, PGY-3  [facility]; [place], [place]  10/29/2021, 2:07 PM                 Attending Physician Statement   I have discussed the care of this patient, including pertinent history and exam findings, with the resident. I have seen and examined the patient and the key elements of all parts of the encounter have been performed by me. I agree with the assessment, plan and orders as documented by the resident and with changes made to the note. Hold off anticoagulation  GI evaluation  Transfuse PRBC if hemoglobin less than seven  IV iron infusion  Will delay chemotherapy until acute issue results    Tc Arreola MD  Hematology/Oncology    Cell: [phone]        This note is created with the assistance of a speech recognition program.  While intending to generate a document that actually reflects the content of the visit, the document can still have some errors including those of syntax and sound a like substitutions which may escape proof reading. It such instances, actual meaning can be extrapolated by contextual diversion.

## 2021-10-29 NOTE — PLAN OF CARE
Ongoing  Goal: Ability to verbalize follow-up procedures will improve  Description: Ability to verbalize follow-up procedures will improve  Outcome: Ongoing     Problem: Metabolic:  Goal: Ability to maintain appropriate glucose levels will improve  Description: Ability to maintain appropriate glucose levels will improve  Outcome: Ongoing     Problem: Nutritional:  Goal: Maintenance of adequate nutrition will improve  Description: Maintenance of adequate nutrition will improve  Outcome: Ongoing  Goal: Progress toward achieving an optimal weight will improve  Description: Progress toward achieving an optimal weight will improve  Outcome: Ongoing  Goal: Nutritional status will improve  Description: Nutritional status will improve  Outcome: Ongoing     Problem: Skin Integrity:  Goal: Risk for impaired skin integrity will decrease  Description: Risk for impaired skin integrity will decrease  Outcome: Ongoing     Problem: Tissue Perfusion:  Goal: Adequacy of tissue perfusion will improve  Description: Adequacy of tissue perfusion will improve  Outcome: Ongoing     Problem: Falls - Risk of:  Goal: Will remain free from falls  Description: Will remain free from falls  Outcome: Ongoing  Goal: Absence of physical injury  Description: Absence of physical injury  Outcome: Ongoing     Problem: Safety:  Goal: Ability to remain free from injury will improve  Description: Ability to remain free from injury will improve  Outcome: Ongoing

## 2021-10-29 NOTE — PROGRESS NOTES
[place]  Internal Medicine Teaching Residency Program  Inpatient Daily Progress Note  ______________________________________________________________________________    Patient: [facility]  YOB: 1965   XLV:[id number]    Acct: [de-identified]     Room: [facility]5181-15  Admit date: 10/28/2021  Today's date: 10/29/21  Number of days in the hospital: 1    SUBJECTIVE   CC: Fatigue and Extremity Weakness    Pt examined at bedside. Chart & results reviewed. - vitals stable, pt is saturating well on 3L  - labs reviewed   - no acute events overnight.   - Patient denies headache, vision problems, nausea, vomiting, chest pain, cough, abdominal pain, changes in bowel or urinary habits, and swelling. ROS:  General ROS: Completed and except as mentioned above were negative   HEENT ROS: Completed and except as mentioned above were negative   Allergy and Immunology ROS:  Completed and except as mentioned above were negative  Hematological and Lymphatic ROS:  Completed and except as mentioned above were negative  Respiratory ROS:  Completed and except as mentioned above were negative  Cardiovascular ROS:  Completed and except as mentioned above were negative  Gastrointestinal ROS: Completed and except as mentioned above were negative  Genito-Urinary ROS:  Completed and except as mentioned above were negative  Musculoskeletal ROS:  Completed and except as mentioned above were negative  Neurological ROS:  Completed and except as mentioned above were negative  Skin & Dermatological ROS:  Completed and except as mentioned above were negative  Psychological ROS:  Completed and except as mentioned above were negative  BRIEF HISTORY     he patient is a 60 y. o.female with PMH of recurrent ovarian cancer (initially diagnosed in 2005) receiving chemotherapy who presents to the ED today due to fatigue, weakness for the last couple of days.  Patient states it is generalized all over her body, denies any nausea, vomiting. Patient states that she has episode of dizziness when getting up from the bed, but denies any history of fall or syncope. Patient denies any tingling, numbness, any slurred speech. Patient also states that she has been constipated and her last bowel movement was 4 to 5 days ago which is new for her. She also complains of intermittent SOB since all of this started. Patient never had a colonoscopy before, no family history of colon cancer.     On arrival to the ED, patient heart rate was in the higher 90s, other vitals were stable and was saturating at 87% on room air so she was placed on 3 L NC. Pertinent labs were ordered which showed a hemoglobin of 4.3, Plt was above 700. Blood transfusion was ordered.      When I went to evaluate the patient patient was lying comfortably in the bed, with 3 L nasal cannula. Patient denied headache, vision problems, nausea, vomiting, chest pain, cough, abdominal pain, changes in bowel or urinary habits, and swelling.     Patient follows up with Dr. Sri Estevez for her recurrent ovarian cancer, patient was on Doxil/Avastin which was started on 9/18/2020 which was discontinued due to GI bleed. Patient was started on Gemzar on 2/26/2021. Patient did not receive it due to hospitalization.      Recent Hospitalization:  -6/24: Was admitted for status epilepticus, stable on discharge  -3/12: Patient was admitted for anemia required 1 unit packed RBC.     PMH:  -Recurrent ovarian cancer initially diagnosed in 2005 metastasis to the bone, receiving chemotherapy.   -DVT (1/2021)-on Eliquis  -History of intra-abdominal bleed due to splenic mass s/p coiling and embolization 10/2020.  -Hypertension  -Seizure disorder        Social:  -Tobacco: 1 pack/day  -Alcohol: Negative  -Illicit Drug Use: Negative    OBJECTIVE     Vital Signs:  BP (!) 141/64   Pulse 92   Temp 98.3 °F (36.8 °C) (Oral)   Resp 16   Ht 5' 5\" (1.651 m)   Wt 163 lb 5.8 oz (74.1 kg) SpO2 95%   BMI 27.18 kg/m²     Temp (24hrs), Av.8 °F (37.1 °C), Min:97.3 °F (36.3 °C), Max:99.4 °F (37.4 °C)    In: 1000   Out: -     Physical Exam:  Vitals reviewed. Constitutional:       General: She is awake. Appearance: Normal appearance. She is normal weight. HENT:      Head: Normocephalic. Eyes:      General: Lids are normal.      Pupils: Pupils are equal, round, and reactive to light. Cardiovascular:      Rate and Rhythm: Normal rate and regular rhythm. Pulses: Normal pulses. Heart sounds: Normal heart sounds. Pulmonary:      Effort: Pulmonary effort is normal.      Breath sounds: Normal breath sounds and air entry. Musculoskeletal:      Right lower leg: No swelling. Left lower leg: No swelling. Skin:     General: Skin is warm. Capillary Refill: Capillary refill takes less than 2 seconds. Neurological:      Mental Status: She is alert and oriented to person, place, and time.    Psychiatric:         Attention and Perception: Attention normal.         Mood and Affect: Mood normal.         Speech: Speech normal.         Behavior: Behavior is cooperative.         Medications:  Scheduled Medications:    morphine  30 mg Oral 2 times per day    sodium chloride flush  5-40 mL IntraVENous 2 times per day    pantoprazole  40 mg IntraVENous Daily    And    sodium chloride (PF)  10 mL IntraVENous Daily    levETIRAcetam  1,500 mg Oral BID    lamoTRIgine  75 mg Oral BID    enoxaparin  1 mg/kg SubCUTAneous BID    senna  2 tablet Oral Nightly     Continuous Infusions:    sodium chloride      sodium chloride       PRN MedicationsoxyCODONE-acetaminophen, 1 tablet, Q4H PRN  sodium chloride, , PRN  sodium chloride flush, 5-40 mL, PRN  sodium chloride, 25 mL, PRN  ondansetron, 4 mg, Q8H PRN   Or  ondansetron, 4 mg, Q6H PRN  polyethylene glycol, 17 g, Daily PRN  acetaminophen, 650 mg, Q6H PRN   Or  acetaminophen, 650 mg, Q6H PRN        Diagnostic Labs:  CBC:   Recent Labs 10/28/21  1228 10/28/21  1707 10/28/21  2145   WBC 10.5  --   --    RBC 2.29*  --   --    HGB 4.3* 7.3* 7.1*   HCT 16.8* 26.5* 26.0*   MCV 73.4*  --   --    RDW 22.7*  --   --    *  --   --      BMP:   Recent Labs     10/28/21  1228      K 3.9      CO2 23   BUN 13   CREATININE 0.43*     BNP: No results for input(s): BNP in the last 72 hours. PT/INR: No results for input(s): PROTIME, INR in the last 72 hours. APTT: No results for input(s): APTT in the last 72 hours. CARDIAC ENZYMES: No results for input(s): CKMB, CKMBINDEX, TROPONINI in the last 72 hours. Invalid input(s): CKTOTAL;3  FASTING LIPID PANEL:  Lab Results   Component Value Date    CHOL 131 03/10/2021    HDL 33 (L) 03/10/2021    TRIG 147 03/10/2021     LIVER PROFILE:   Recent Labs     10/28/21  1228   AST 7   ALT <5*   BILITOT <0.10*   ALKPHOS 58      MICROBIOLOGY:   Lab Results   Component Value Date/Time    CULTURE NO GROWTH 6 DAYS 06/21/2021 09:32 AM       Imaging:    No results found. ASSESSMENT & PLAN     IMPRESSION  This is a 60 y.o. female who presented with generalized fatigue and weakness and found to have decreased hemoglobin.  Patient admitted to inpatient status for the management of acute anemia.     Chronic anemia likely secondary to chemotherapy vs cancer  -Hemoglobin above 7  -Monitor H&H  -transfuse if Hb below 7  -FOBT positive  -resume diet  -IV Protonix  -no signs of overt bleeding, monitor for symptoms  -Iron 7, ferritin 4     History of seizure disorder  -continue home meds.      Essential hypertension  -Blood pressure WNL, will resume antihypertensive as needed    Constipation  -dulcolax suppository     History of DVT  -We will hold on Eliquis.  -Will start on lovenox  -monitor for overt bleeding     History of ovarian cancer metastatic to the bone  -Receiving chemotherapy  -Follows-up with Dr Ramiro Felix  -hem onc consulted    Thrombocytosis  -coag profile   - hem-onc consulted     DVT ppx: lovenox  GI ppx: protonix  Diet: Npo for now  Case Management: consulted    Disposition: Will stay inpatient till the Hb stabilizes.      Kiersten uBcio MD  PGY-1, Internal Medicine Resident  [facility]         10/29/2021, 2:45 AM

## 2021-10-30 LAB
ANION GAP SERPL CALCULATED.3IONS-SCNC: 13 MMOL/L (ref 9–17)
BUN BLDV-MCNC: 6 MG/DL (ref 6–20)
BUN/CREAT BLD: ABNORMAL (ref 9–20)
CALCIUM SERPL-MCNC: 8.2 MG/DL (ref 8.6–10.4)
CHLORIDE BLD-SCNC: 104 MMOL/L (ref 98–107)
CO2: 23 MMOL/L (ref 20–31)
CREAT SERPL-MCNC: 0.52 MG/DL (ref 0.5–0.9)
GFR AFRICAN AMERICAN: >60 ML/MIN
GFR NON-AFRICAN AMERICAN: >60 ML/MIN
GFR SERPL CREATININE-BSD FRML MDRD: ABNORMAL ML/MIN/{1.73_M2}
GFR SERPL CREATININE-BSD FRML MDRD: ABNORMAL ML/MIN/{1.73_M2}
GLUCOSE BLD-MCNC: 83 MG/DL (ref 70–99)
HCT VFR BLD CALC: 27.3 % (ref 36.3–47.1)
HCT VFR BLD CALC: 27.4 % (ref 36.3–47.1)
HCT VFR BLD CALC: 29.9 % (ref 36.3–47.1)
HCT VFR BLD CALC: 31.3 % (ref 36.3–47.1)
HEMOGLOBIN: 7.7 G/DL (ref 11.9–15.1)
HEMOGLOBIN: 7.8 G/DL (ref 11.9–15.1)
HEMOGLOBIN: 8.3 G/DL (ref 11.9–15.1)
HEMOGLOBIN: 8.6 G/DL (ref 11.9–15.1)
MAGNESIUM: 1.8 MG/DL (ref 1.6–2.6)
POTASSIUM SERPL-SCNC: 3.5 MMOL/L (ref 3.7–5.3)
SODIUM BLD-SCNC: 140 MMOL/L (ref 135–144)

## 2021-10-30 PROCEDURE — 6370000000 HC RX 637 (ALT 250 FOR IP): Performed by: STUDENT IN AN ORGANIZED HEALTH CARE EDUCATION/TRAINING PROGRAM

## 2021-10-30 PROCEDURE — 99232 SBSQ HOSP IP/OBS MODERATE 35: CPT | Performed by: INTERNAL MEDICINE

## 2021-10-30 PROCEDURE — 83735 ASSAY OF MAGNESIUM: CPT

## 2021-10-30 PROCEDURE — 85014 HEMATOCRIT: CPT

## 2021-10-30 PROCEDURE — 6360000002 HC RX W HCPCS

## 2021-10-30 PROCEDURE — 96366 THER/PROPH/DIAG IV INF ADDON: CPT

## 2021-10-30 PROCEDURE — 6370000000 HC RX 637 (ALT 250 FOR IP)

## 2021-10-30 PROCEDURE — 2580000003 HC RX 258: Performed by: STUDENT IN AN ORGANIZED HEALTH CARE EDUCATION/TRAINING PROGRAM

## 2021-10-30 PROCEDURE — 80048 BASIC METABOLIC PNL TOTAL CA: CPT

## 2021-10-30 PROCEDURE — G0378 HOSPITAL OBSERVATION PER HR: HCPCS

## 2021-10-30 PROCEDURE — 97530 THERAPEUTIC ACTIVITIES: CPT

## 2021-10-30 PROCEDURE — 97162 PT EVAL MOD COMPLEX 30 MIN: CPT

## 2021-10-30 PROCEDURE — 96376 TX/PRO/DX INJ SAME DRUG ADON: CPT

## 2021-10-30 PROCEDURE — 85018 HEMOGLOBIN: CPT

## 2021-10-30 PROCEDURE — 36415 COLL VENOUS BLD VENIPUNCTURE: CPT

## 2021-10-30 PROCEDURE — 96372 THER/PROPH/DIAG INJ SC/IM: CPT

## 2021-10-30 PROCEDURE — C9113 INJ PANTOPRAZOLE SODIUM, VIA: HCPCS

## 2021-10-30 PROCEDURE — 6370000000 HC RX 637 (ALT 250 FOR IP): Performed by: NURSE PRACTITIONER

## 2021-10-30 PROCEDURE — 6360000002 HC RX W HCPCS: Performed by: STUDENT IN AN ORGANIZED HEALTH CARE EDUCATION/TRAINING PROGRAM

## 2021-10-30 PROCEDURE — 2060000000 HC ICU INTERMEDIATE R&B

## 2021-10-30 PROCEDURE — 2580000003 HC RX 258

## 2021-10-30 PROCEDURE — 99222 1ST HOSP IP/OBS MODERATE 55: CPT | Performed by: INTERNAL MEDICINE

## 2021-10-30 RX ORDER — UREA 10 %
10 LOTION (ML) TOPICAL
Status: ACTIVE | OUTPATIENT
Start: 2021-10-30 | End: 2021-10-30

## 2021-10-30 RX ORDER — QUETIAPINE FUMARATE 25 MG/1
12.5 TABLET, FILM COATED ORAL ONCE
Status: DISCONTINUED | OUTPATIENT
Start: 2021-10-30 | End: 2021-11-01 | Stop reason: HOSPADM

## 2021-10-30 RX ADMIN — PANTOPRAZOLE SODIUM 40 MG: 40 INJECTION, POWDER, FOR SOLUTION INTRAVENOUS at 08:59

## 2021-10-30 RX ADMIN — FENTANYL CITRATE 50 MCG: 50 INJECTION, SOLUTION INTRAMUSCULAR; INTRAVENOUS at 01:56

## 2021-10-30 RX ADMIN — FENTANYL CITRATE 50 MCG: 50 INJECTION, SOLUTION INTRAMUSCULAR; INTRAVENOUS at 04:18

## 2021-10-30 RX ADMIN — FENTANYL CITRATE 50 MCG: 50 INJECTION, SOLUTION INTRAMUSCULAR; INTRAVENOUS at 17:27

## 2021-10-30 RX ADMIN — OXYCODONE HYDROCHLORIDE AND ACETAMINOPHEN 1 TABLET: 5; 325 TABLET ORAL at 01:57

## 2021-10-30 RX ADMIN — POLYETHYLENE GLYCOL 3350 17 G: 17 POWDER, FOR SOLUTION ORAL at 14:09

## 2021-10-30 RX ADMIN — LEVETIRACETAM 1500 MG: 500 TABLET, FILM COATED ORAL at 21:58

## 2021-10-30 RX ADMIN — MORPHINE SULFATE 30 MG: 15 TABLET, EXTENDED RELEASE ORAL at 09:00

## 2021-10-30 RX ADMIN — SENNOSIDES 17.2 MG: 8.6 TABLET, FILM COATED ORAL at 21:57

## 2021-10-30 RX ADMIN — SODIUM CHLORIDE, PRESERVATIVE FREE 10 ML: 5 INJECTION INTRAVENOUS at 09:00

## 2021-10-30 RX ADMIN — OXYCODONE HYDROCHLORIDE AND ACETAMINOPHEN 1 TABLET: 5; 325 TABLET ORAL at 06:41

## 2021-10-30 RX ADMIN — LAMOTRIGINE 75 MG: 25 TABLET ORAL at 21:58

## 2021-10-30 RX ADMIN — LAMOTRIGINE 75 MG: 25 TABLET ORAL at 08:58

## 2021-10-30 RX ADMIN — SODIUM CHLORIDE, PRESERVATIVE FREE 10 ML: 5 INJECTION INTRAVENOUS at 21:41

## 2021-10-30 RX ADMIN — ENOXAPARIN SODIUM 70 MG: 80 INJECTION SUBCUTANEOUS at 08:58

## 2021-10-30 RX ADMIN — MORPHINE SULFATE 30 MG: 15 TABLET, EXTENDED RELEASE ORAL at 21:58

## 2021-10-30 RX ADMIN — SODIUM CHLORIDE, PRESERVATIVE FREE 10 ML: 5 INJECTION INTRAVENOUS at 09:29

## 2021-10-30 RX ADMIN — OXYCODONE HYDROCHLORIDE AND ACETAMINOPHEN 1 TABLET: 5; 325 TABLET ORAL at 14:09

## 2021-10-30 RX ADMIN — LEVETIRACETAM 1500 MG: 500 TABLET, FILM COATED ORAL at 08:58

## 2021-10-30 RX ADMIN — SERTRALINE 100 MG: 50 TABLET, FILM COATED ORAL at 10:45

## 2021-10-30 RX ADMIN — ONDANSETRON 4 MG: 2 INJECTION INTRAMUSCULAR; INTRAVENOUS at 02:01

## 2021-10-30 RX ADMIN — FENTANYL CITRATE 50 MCG: 50 INJECTION, SOLUTION INTRAMUSCULAR; INTRAVENOUS at 21:41

## 2021-10-30 RX ADMIN — IRON SUCROSE 300 MG: 20 INJECTION, SOLUTION INTRAVENOUS at 21:51

## 2021-10-30 RX ADMIN — FENTANYL CITRATE 50 MCG: 50 INJECTION, SOLUTION INTRAMUSCULAR; INTRAVENOUS at 06:41

## 2021-10-30 ASSESSMENT — ENCOUNTER SYMPTOMS: TACHYPNEA: 1

## 2021-10-30 ASSESSMENT — PAIN DESCRIPTION - PROGRESSION

## 2021-10-30 ASSESSMENT — PAIN DESCRIPTION - DESCRIPTORS
DESCRIPTORS: ACHING
DESCRIPTORS: ACHING;DISCOMFORT

## 2021-10-30 ASSESSMENT — PAIN DESCRIPTION - ONSET
ONSET: ON-GOING
ONSET: ON-GOING

## 2021-10-30 ASSESSMENT — PAIN SCALES - GENERAL
PAINLEVEL_OUTOF10: 8
PAINLEVEL_OUTOF10: 8
PAINLEVEL_OUTOF10: 9
PAINLEVEL_OUTOF10: 9
PAINLEVEL_OUTOF10: 8
PAINLEVEL_OUTOF10: 8
PAINLEVEL_OUTOF10: 10
PAINLEVEL_OUTOF10: 6
PAINLEVEL_OUTOF10: 7
PAINLEVEL_OUTOF10: 9

## 2021-10-30 ASSESSMENT — PAIN DESCRIPTION - ORIENTATION
ORIENTATION: RIGHT;LEFT
ORIENTATION: ANTERIOR

## 2021-10-30 ASSESSMENT — PAIN DESCRIPTION - FREQUENCY
FREQUENCY: CONTINUOUS
FREQUENCY: CONTINUOUS

## 2021-10-30 ASSESSMENT — PAIN - FUNCTIONAL ASSESSMENT: PAIN_FUNCTIONAL_ASSESSMENT: ACTIVITIES ARE NOT PREVENTED

## 2021-10-30 ASSESSMENT — PAIN DESCRIPTION - LOCATION
LOCATION: ABDOMEN
LOCATION: ABDOMEN;BACK

## 2021-10-30 ASSESSMENT — PAIN DESCRIPTION - DIRECTION: RADIATING_TOWARDS: BACK

## 2021-10-30 ASSESSMENT — PAIN DESCRIPTION - PAIN TYPE
TYPE: ACUTE PAIN;CHRONIC PAIN
TYPE: ACUTE PAIN

## 2021-10-30 NOTE — PROGRESS NOTES
Educated patient on [employer] policies. Pt stated any and all information can be given to her children. Pt medication for pain and constipation and resting comfortably in bed, side rails up x 2, call light within reach and breathing evenly and unlabored.

## 2021-10-30 NOTE — CONSULTS
[facility]    GASTROENTEROLOGY CONSULT    Patient:   Heike Olson   :    1965   Facility:   [facility]   Date:    10/30/2021  Admission Dx:  Anemia [D64.9]  Abdominal pain, unspecified abdominal location [R10.9]  Constipation, unspecified constipation type [K59.00]  Anemia, unspecified type [D64.9]  Requesting physician: Hugo Singletary DO  Reason for consult:  Severe TRACIE, abnormal CT   CC : Weakness and fatigue    SUBJECTIVE     HISTORY OF PRESENT ILLNESS  This is a 60 y.o.   female who was admitted 10/28/2021 with Anemia [D64.9]  Abdominal pain, unspecified abdominal location [R10.9]  Constipation, unspecified constipation type [K59.00]  Anemia, unspecified type [D64.9]. We have been asked to see the patient in consultation by Hugo Singletary DO for severe TRACIE, Abnormal CT showing diffuse colonic distention and mucosal rectal thickening    49-year-old female with a history of recurrent metastatic ovarian carcinoma with intra-abdominal, peritoneal and lung mets previously on Taxol and carboplatin and currently on Gemzar, left leg DVT on Eliquis, diabetes, tobacco use disorder, GERD, colectomy , GI bleeding secondary to splenic mass s/p embolization  who presented to the ED with weakness and fatigue and found to have a hemoglobin of 4.3 g/dL and received a total of 3 units PRBC. CT abdomen and pelvis completed showing diffuse distention of the colon, possible narrowing with mucosal thickening at the rectum. Patient denies any overt signs of GI bleeding. She reports having a change in bowel habits following splenic embolization in . Patient reports she is typically constipated with large amount of flatus. This week her bowel habits have varied between loose and formed. She has tenesmus as well as occasional straining for defecation. Most recent EGD and colonoscopy completed as below.     Patient reports smoking 1 pack/day tobacco.  No alcohol or illicit drug use. Previous GI history:   COLONOSCOPY 08/06/2020 - [facility]    Impression:                   - Incomplete colonoscopy up to 30 cm from the                                 anal verge. High-grade stricture was noted at                                 30 cm from the anal verge likely from adhesions                                 due to previous abdominal surgeries.                               - Hemorrhoids found on perianal exam.                                 - The rectum is normal.                                 - No specimens collected. Recommendation:               - Return patient to hospital cage for ongoing                                 care.                                 - Resume previous diet.                                 - Continue present medications.                                 - Perform a computed tomographic (CT scan)                                 enterography at appointment to be scheduled.                                 - The findings and recommendations were                                 discussed with the patient.   [de-identified]                            I was present in the endoscopy room during the                                 entire procedure to supervise the fellow. Dr. Karlos Alberto,   8/6/2020 7:53:26 PM     EGD - [facility]     Impression:            - Normal upper third of esophagus, middle third                                 of esophagus and lower third of esophagus.                                 - No gross lesions in the stomach. Examination                                 of the gastric fundus was suboptimal because of                                 difficulty sedating the patient.   [de-identified]                            - Normal duodenal bulb, first portion of the                                 duodenum and second portion of the duodenum.                                 Biopsied.    Recommendation:               - Perform a colonoscopy today.                                 - Await pathology results. Dr. Meagan Neves,   8/6/2020 7:50:53 PM       OBJECTIVE:     PAST MEDICAL/SURGICAL HISTORY  Past Medical History:   Diagnosis Date    Anemia     Bleeding 10/2020    intra-abdominal bleeding -due to splenic mass with GI infiltration. Status post embolization    [facility] [facility]     Depression     Diabetes mellitus ([facility] [facility]     GERD (gastroesophageal reflux disease)     Hx of blood clots     Hypertension     Metastatic cancer ([facility] [address] 10/2020    extensive intraabdominal and splenic involvement and lung mets.  Ovarian cancer ([facility] [address]     low grade serous ovarian carcinoma    Post chemo evaluation     [facility] 2008: [place] [facility] due to rising CA-125, 2013: intraperitoneal chemo,12/2015: Ca125 - 25     Splenic lesion      Past Surgical History:   Procedure Laterality Date    ABSCESS DRAINAGE  2013    Franca rectal    ANUS SURGERY      ANAL FISSURECTOMY    CARDIAC CATHETERIZATION      COLECTOMY  03/2013    ex lap, tumor debulking, transverse colectomy w reanastamosis, subgastric omentectomy, intraperitoneal port placement    HYSTERECTOMY, TOTAL ABDOMINAL      IR EMBOLIZATION HEMORRHAGE  10/05/2020    intra-abdominal bleeding -due to splenic mass with GI infiltration. Status post embolization [employer] interlock coils x7. mri condtional 3t ok, safe immediately post implant.  IR PORT PLACEMENT EQUAL OR GREATER THAN 5 YEARS  08/24/2020    IR PORT PLACEMENT EQUAL OR GREATER THAN 5 YEARS 8/24/2020 [facility]    PORT SURGERY      IP Port    TONSILLECTOMY         ALLERGIES:  Allergies   Allergen Reactions    Ceftriaxone      Had a rash on ceftriaxone December 2020, Vlad 23:  Prior to Admission medications    Medication Sig Start Date End Date Taking?  Authorizing Provider   morphine (MSIR) 30 MG tablet Take 30 mg by mouth 2 times daily as needed for Pain.    Yes Historical Provider, MD   lamoTRIgine (LAMICTAL) 25 MG tablet TAKE 3 TABS IN IN THE MORNING AND 3 TABS IN IN THE EVENING 10/5/21  Yes Millie Oropeza MD   sertraline (ZOLOFT) 100 MG tablet TAKE 1 TABLET BY MOUTH DAILY 10/4/21 11/3/21 Yes Bel Sheppard MD   levETIRAcetam (KEPPRA) 750 MG tablet Take 2 tablets by mouth 2 times daily 7/12/21  Yes Millie Oropeza MD   ELIQUIS 5 MG TABS tablet Take 5 mg by mouth 2 times daily  5/26/21  Yes Historical Provider, MD   oxyCODONE-acetaminophen (PERCOCET) 5-325 MG per tablet TAKE 1 TABLET BY MOUTH EVERY 4 HOURS AS NEEDED FOR PAIN (BREAKTHROUGH PAIN) - REDUCE DOSES TAKEN AS PAIN BECOMES MANAGEABLE 10/25/21 11/24/21  Bel Sheppard MD   dicyclomine (BENTYL) 20 MG tablet Take 1 tablet by mouth 3 times daily as needed (cramps)  Patient not taking: Reported on 9/17/2021 8/27/21   Bel Sheppard MD   ondansetron (ZOFRAN-ODT) 4 MG disintegrating tablet Take 1 tablet by mouth every 8 hours as needed for Nausea or Vomiting 6/23/21   Tati Govea MD   pantoprazole (PROTONIX) 40 MG tablet Take 1 tablet by mouth 2 times daily  Patient not taking: Reported on 7/9/2021 5/10/21   Bel Sheppard MD   ferrous sulfate (FE TABS 325) 325 (65 Fe) MG EC tablet Take 1 tablet by mouth daily (with breakfast)  Patient not taking: Reported on 7/9/2021 3/12/21   ESTRELLITA Garcia - NP   aspirin 81 MG chewable tablet Take 81 mg by mouth nightly Pt not taking    Historical Provider, MD       CURRENT MEDICATIONS:  Scheduled Meds:   morphine  30 mg Oral 2 times per day    iron sucrose  300 mg IntraVENous Q24H    sodium chloride flush  5-40 mL IntraVENous 2 times per day    pantoprazole  40 mg IntraVENous Daily    And    sodium chloride (PF)  10 mL IntraVENous Daily    levETIRAcetam  1,500 mg Oral BID    lamoTRIgine  75 mg Oral BID    enoxaparin  1 mg/kg SubCUTAneous BID    senna  2 tablet Oral Nightly     Continuous Infusions:   sodium chloride      sodium chloride      sodium chloride       PRN Meds:oxyCODONE-acetaminophen, sodium chloride, bisacodyl, fentanNYL, fentanNYL, sodium chloride, sodium chloride flush, sodium chloride, ondansetron **OR** ondansetron, polyethylene glycol, acetaminophen **OR** acetaminophen    SOCIAL HISTORY:     Tobacco:   reports that she has been smoking cigarettes. She has been smoking about 1.00 pack per day. She uses smokeless tobacco.  Alcohol:   reports previous alcohol use. Illicit drugs:  reports no history of drug use. FAMILY HISTORY:     Family History   Problem Relation Age of Onset    Alcohol Abuse Mother     Cirrhosis Mother        REVIEW OF SYSTEMS:    Constitutional: No fever, no chills, no lethargy, + fatigue and weakness. HEENT:  No headache, otalgia, itchy eyes, nasal discharge or sore throat. Cardiac:  No chest pain, dyspnea, orthopnea or PND. Chest:   No cough, phlegm or wheezing. Abdomen:  No abdominal pain, nausea or vomiting. Neuro:  No focal weakness, abnormal movements or seizure like activity. Skin:   No rashes, no itching. :   No hematuria, no pyuria, no dysuria, no flank pain. Extremities:  No swelling or joint pains. ROS was otherwise negative except as mentioned in the [address] PHYSICAL EXAM:    BP (!) 120/57   Pulse 88   Temp 98.5 °F (36.9 °C) (Temporal)   Resp 19   Ht 5' 5\" (1.651 m)   Wt 163 lb 5.8 oz (74.1 kg)   SpO2 96%   BMI 27.18 kg/m²     GENERAL:  Chronically ill,  Pale, Well developed, Well nourished, No apparent distress  HEAD:   Normocephalic, Atraumatic  EENT:   EOMI, Sclera not icteric, Oropharynx moist   NECK:   Supple, Trachea midline  LUNGS:  CTA Bilaterally  HEART:  RRR, No murmur  ABDOMEN:   Soft, Nontender, Nondistended, BS WNL  EXT:   No clubbing. No cyanosis. No edema. SKIN:   No rashes. No jaundice. No stigmata of liver disease.     MUSC/SKEL:   Adequate muscle bulk for patient's age, No significant synovitis, No deformities  NEURO:  A&O x Three, CN II- XII grossly intact      LABS AND IMAGING:     CBC  Recent Labs     10/28/21  1228 10/28/21  1707 10/29/21  0927 10/29/21  0927 10/29/21  1547 10/29/21  2206 10/30/21  0312   WBC 10.5  --  12.2*  --   --   --   --    HGB 4.3*   < > 8.1*   < > 8.3* 8.3* 7.8*   HCT 16.8*   < > 28.2*   < > 30.5* 28.5* 27.4*   MCV 73.4*  --  78.6*  --   --   --   --    MCH 18.8*  --  22.6*  --   --   --   --    MCHC 25.6*  --  28.7  --   --   --   --    *  --  609*  --   --   --   --     < > = values in this interval not displayed. IMMATURE PLTs  Lab Results   Component Value Date    PLTFLUORE 1,172 06/23/2021    PLTFLUORE 1,225 06/22/2021    PLTFLUORE 1,172 06/21/2021       BMP  Recent Labs     10/28/21  1228 10/29/21  0927    137   K 3.9 3.5*    104   CO2 23 20   BUN 13 9   CREATININE 0.43* 0.40*   GLUCOSE 96 107*   CALCIUM 8.1* 8.0*       LFTS  Recent Labs     10/28/21  1228 10/29/21  0927   ALKPHOS 58 61   ALT <5* <5*   AST 7 10   PROT 5.6* 5.4*   BILITOT <0.10* 0.39   LABALBU 3.4* 3.3*       AMYLASE/LIPASE/AMMONIA  Recent Labs     10/28/21  1228   LIPASE 9*       PT/INR  No results for input(s): PROTIME, INR in the last 72 hours. ANEMIA STUDIES  Recent Labs     10/28/21  1228   IRON 7*   LABIRON 2*   TIBC 291   UIBC 284   FERRITIN 4*       IMAGING  CT ABDOMEN PELVIS W IV CONTRAST Additional Contrast? Radiologist Recommendation    Result Date: 10/29/2021  EXAMINATION: CT OF THE ABDOMEN AND PELVIS WITH CONTRAST 10/29/2021 3:29 pm TECHNIQUE: CT of the abdomen and pelvis was performed with the administration of intravenous contrast. Multiplanar reformatted images are provided for review. Dose modulation, iterative reconstruction, and/or weight based adjustment of the mA/kV was utilized to reduce the radiation dose to as low as reasonably achievable.  COMPARISON: CT from August 20, 2020 HISTORY: ORDERING SYSTEM PROVIDED HISTORY: Foolow up on ovarian carcinom TECHNOLOGIST PROVIDED HISTORY: Foolow up on ovarian carcinom Reason for Exam: Fatigue; Extremity Weakness Acuity: Unknown Type of Exam: Unknown FINDINGS: Lower Chest: Stable nodular densities in the medial right lung base and left lower lobe. These measure up to 16 mm. Also seen are perihilar nodular densities, also seen on exam from March 9, 2021, but measuring 16 mm versus 12 mm previously on August 20, 2020. No pleural effusion at the lung bases. Mild atelectasis. Organs: Liver: Normal appearance of the liver. Gallbladder: Large calcified gallstone in the gallbladder. No pericholecystic inflammatory changes or biliary ductal dilatation. Spleen: Heterogeneous calcifications near the spleen with postoperative clips near the splenic hilum, limiting evaluation. Partially calcified lesion at the inferior spleen, could be sequela of prior trauma. Pancreas: No peripancreatic inflammatory changes with the limitations of streak artifact. Adrenal Glands: No focal adrenal abnormalities identified. Kidneys: No hydronephrosis. GI/Bowel: Stomach: The stomach is nondistended. Small bowel: No evidence of small bowel obstruction. Colon: Large amount of stool in the colon. Mucosal thickening is possible at the rectum where there is mild wall thickening. Diffuse distention of the colon, particularly the transverse colon. Appendix: Normal appearance of the appendix. Pelvis: Redemonstration of calcified masses in the pelvis measuring up to 3.9 cm, not significantly changed from prior study. No free fluid in the pelvis. Partially distended urinary bladder. Peritoneum/Retroperitoneum: Atherosclerosis of the aorta. Mildly prominent left periaortic lymph nodes, could also be vascular in nature and unchanged or smaller than prior study. Mildly prominent left inguinal lymph nodes are unchanged. Postoperative findings at the ventral abdominal wall.  Bones/Soft Tissues: Multiple sclerotic foci throughout the spine concerning for osseous metastatic disease. This is stable compared to prior study. Diffuse distension of the colon. There is possible narrowing with mucosal thickening at the rectum. Underlying mucosal abnormality is not excluded. Recommend correlation with direct visualization. No evidence of small-bowel obstruction. Increased size of a left lower lobe pulmonary nodule. Of note are chronic pulmonary nodules in the lung bases. Findings are concerning for worsened metastatic disease in the chest.  Recommend correlation with dedicated CT chest when clinical condition allows. Otherwise no acute findings or significant change from prior study. Stable calcified masses in the pelvis, could be sequela of known or treated disease versus uterine fibroids. Stable osseous metastatic disease. IMPRESSION:     1. Acute on chronic anemia - likely multifactorial - denies any overt GI bleeding  2. Abnormal CT findings -colonic distention and possible narrowing with mucosal thickening at rectum. Likely secondary to previous colonoscopy findings in 2020 showing high grade stricture - suspected due to adhesions from previous abdominal surgery. Given hx of CA, can look for CRC  3. Recurrent metastatic ovarian carcinoma  4. History of DVT on long-term anticoagulation -Eliquis      Old records, labs and imaging reviewed. PLAN   1. Diet as tolerated. 2.  Tentatively plan for EGD and flex sigmoidoscopy  3. Monitor hemoglobin and hematocrit. Transfuse to keep hemoglobin greater than 7 g/dL or as clinically indicated    This plan was formulated in collaboration with Dr. Bill Reich  Thank you for allowing us to participate in the care of your patient. Electronically signed by: ESTRELLITA Duarte CNP on 10/30/2021 at 8:31 AM     Please note that this note was generated using a voice recognition dictation software.   Although every effort was made to ensure the accuracy of this automated transcription, some errors in transcription may have occurred.

## 2021-10-30 NOTE — PLAN OF CARE
health-related needs will improve  10/30/2021 0916 by Zbigniew Fierro RN  Outcome: Ongoing  10/30/2021 0327 by Lexi Francis RN  Outcome: Ongoing  Goal: Adoption of positive health habits will improve  10/30/2021 0916 by Zbigniew Fierro RN  Outcome: Ongoing  10/30/2021 0327 by Lexi Francis RN  Outcome: Ongoing  Goal: Identification of resources available to assist in meeting health care needs will improve  10/30/2021 0916 by Zbigniew Fierro RN  Outcome: Ongoing  10/30/2021 0327 by Lexi Francis RN  Outcome: Ongoing  Goal: Ability to verbalize follow-up procedures will improve  10/30/2021 0916 by Zbigniew Fierro RN  Outcome: Ongoing  10/30/2021 0327 by Lexi Francis RN  Outcome: Ongoing     Problem: Metabolic:  Goal: Ability to maintain appropriate glucose levels will improve  10/30/2021 0916 by Zbigniew Fierro RN  Outcome: Ongoing  10/30/2021 0327 by Lexi Francis RN  Outcome: Ongoing     Problem: Nutritional:  Goal: Maintenance of adequate nutrition will improve  10/30/2021 0916 by Zbigniew Fierro RN  Outcome: Ongoing  10/30/2021 0327 by Lexi Francis RN  Outcome: Ongoing  Goal: Progress toward achieving an optimal weight will improve  10/30/2021 0916 by Zbigniew Fierro RN  Outcome: Ongoing  10/30/2021 0327 by Lexi Francis RN  Outcome: Ongoing  Goal: Nutritional status will improve  10/30/2021 0916 by Zbigniew Fierro RN  Outcome: Ongoing  10/30/2021 0327 by Lexi Francis RN  Outcome: Ongoing     Problem: Physical Regulation:  Goal: Complications related to the disease process, condition or treatment will be avoided or minimized  10/30/2021 0916 by Zbigniew Fierro RN  Outcome: Ongoing  10/30/2021 0327 by Lexi Francis RN  Outcome: Ongoing  Goal: Diagnostic test results will improve  10/30/2021 0916 by Zbigniew Fierro RN  Outcome: Ongoing  10/30/2021 0327 by Lexi Francis RN  Outcome: Ongoing     Problem: Skin Integrity:  Goal: Risk for impaired skin integrity will decrease  10/30/2021 3745 by Kaylin Christianson RN  Outcome: Ongoing  10/30/2021 0327 by Cristian Cano RN  Outcome: Ongoing     Problem: Tissue Perfusion:  Goal: Adequacy of tissue perfusion will improve  10/30/2021 0916 by Kaylin Christianson RN  Outcome: Ongoing  10/30/2021 0327 by Cristian Cano RN  Outcome: Ongoing     Problem: Falls - Risk of:  Goal: Will remain free from falls  10/30/2021 0916 by Kaylin Christianson RN  Outcome: Ongoing  10/30/2021 0327 by Cristian Cano RN  Outcome: Ongoing  Goal: Absence of physical injury  10/30/2021 0916 by Kaylin Christianson RN  Outcome: Ongoing  10/30/2021 0327 by Cristian Cano RN  Outcome: Ongoing     Problem: Safety:  Goal: Ability to remain free from injury will improve  10/30/2021 0916 by Kaylin Christianson RN  Outcome: Ongoing  10/30/2021 0327 by Cristian Cano RN  Outcome: Ongoing     Problem: Sensory:  Goal: Ability to develop a pain control plan will improve  10/30/2021 0916 by Kaylin Christianson RN  Outcome: Ongoing  10/30/2021 0327 by Cristian Cano RN  Outcome: Ongoing

## 2021-10-30 NOTE — PROGRESS NOTES
_        Today's Date: 10/30/2021  Patient Name: Cassandra Hair  Date of admission: 10/28/2021 10:54 AM  Patient's age: 60 y.o., 1965  Admission Dx: Anemia [D64.9]  Abdominal pain, unspecified abdominal location [R10.9]  Constipation, unspecified constipation type [K59.00]  Anemia, unspecified type [D64.9]      Requesting Physician: Anita Singh DO    CHIEF COMPLAINT: Abdominal pain, fatigue    History Obtained From:  patient, electronic medical record    Interval history:    Patient seen and examined  Labs and vitals reviewed  Hemoglobin 8.6 stable since morning. Patient evaluated by GI. Recommending EGD and sigmoidoscopy  No acute event overnight. HISTORY OF PRESENT ILLNESS:      The patient is a 60 y.o. female with PMH of  -Recurrent metastatic ovarian carcinoma with intra-abdominal and peritoneal mets-on active chemotherapy  -Acute GI bleed s/p vascular embolization  -DVT-on Eliquis  -Chronic anemia. Who is admitted to the hospital for acute on chronic anemia. On admission, Hb was 4.3 s/p 2 units PRBC transfusion. Hb this morning 6.8, one 1 unit PRBC transfused. Patient is on chronic Eliquis for history of DVT. Hemoccult positive. No overt signs of bleeding. Has history of acute severe GI bleed requiring vascular embolization. 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 Underwent surgical debulking and was placed on systemic therapy with Taxol and carboplatin for 6 cycles. She was in remission for about 2 years and had a relapse of disease in 2007. Was treated with topotecan with good results. Patient relapsed again in 2008 and was placed on Taxol and carboplatin. After 3 cycles of systemic chemotherapy with Taxol and carboplatin,  she could not tolerate carboplatin and finished the therapy with 3 more cycles of Taxol.   She did well again for 2 to 3 years until she had another relapse in 2012 and she had intraperitoneal chemotherapy treatment with Taxol for which responded well. She lost follow-up with her oncologist after 2014 and moved to [place]. She presented with abdominal discomfort in 9/2020 and was found to have metastatic relapse with biopsy confirmed ovarian carcinoma. Scan showed extensive intra-abdominal and splenic involvement with lung mets. -Doxil/Avastin started 9/18/2020. After 1 cycle, Avastin discontinued due to GI bleed  -Intra-abdominal mass in splenic area with GI infiltration and acute GI bleed requiring multiple transfusions s/p vascular embolization  -Left leg DVT-placed on Eliquis. -Gemzar started 2/26/2021. On my evaluation:  Patient is awake alert and oriented x4. In severe painful distress. Hemodynamically stable. Afebrile. S/p 3 units PRBC transfused. Hemoglobin 8.1 this morning. MCV 73. Platelet count 874  Iron 7, ferritin 4, iron saturation 2, TIBC 291. Consistent with severe iron deficiency. CEA trending down. Past Medical History:   has a past medical history of Anemia, Bleeding, Cervical cancer ([address] Depression, Diabetes mellitus ([address] GERD (gastroesophageal reflux disease), Hx of blood clots, Hypertension, Metastatic cancer ([address] Ovarian cancer ([address] Post chemo evaluation, and Splenic lesion. Past Surgical History:   has a past surgical history that includes Hysterectomy, total abdominal; Port Surgery; Tonsillectomy; IR PORT PLACEMENT > 5 YEARS (08/24/2020); Anus surgery; Abscess Drainage (2013); colectomy (03/2013); IR EMBOLIZATION HEMORRHAGE (10/05/2020); and Cardiac catheterization. Family History: family history includes Alcohol Abuse in her mother; Cirrhosis in her mother. Social History:   reports that she has been smoking cigarettes. She has been smoking about 1.00 pack per day. She uses smokeless tobacco. She reports previous alcohol use. She reports that she does not use drugs. Medications:    Prior to Admission medications    Medication Sig Start Date End Date Taking? Authorizing Provider   morphine (MSIR) 30 MG tablet Take 30 mg by mouth 2 times daily as needed for Pain.    Yes Historical Provider, MD   lamoTRIgine (LAMICTAL) 25 MG tablet TAKE 3 TABS IN IN THE MORNING AND 3 TABS IN IN THE EVENING 10/5/21  Yes Millie Oropeza MD   sertraline (ZOLOFT) 100 MG tablet TAKE 1 TABLET BY MOUTH DAILY 10/4/21 11/3/21 Yes David Bhatia MD   levETIRAcetam (KEPPRA) 750 MG tablet Take 2 tablets by mouth 2 times daily 7/12/21  Yes Millie Oropeza MD   ELIQUIS 5 MG TABS tablet Take 5 mg by mouth 2 times daily  5/26/21  Yes Historical Provider, MD   oxyCODONE-acetaminophen (PERCOCET) 5-325 MG per tablet TAKE 1 TABLET BY MOUTH EVERY 4 HOURS AS NEEDED FOR PAIN (BREAKTHROUGH PAIN) - REDUCE DOSES TAKEN AS PAIN BECOMES MANAGEABLE 10/25/21 11/24/21  Bel Sheppard MD   dicyclomine (BENTYL) 20 MG tablet Take 1 tablet by mouth 3 times daily as needed (cramps)  Patient not taking: Reported on 9/17/2021 8/27/21   Bel Sheppard MD   ondansetron (ZOFRAN-ODT) 4 MG disintegrating tablet Take 1 tablet by mouth every 8 hours as needed for Nausea or Vomiting 6/23/21   Tati Govea MD   pantoprazole (PROTONIX) 40 MG tablet Take 1 tablet by mouth 2 times daily  Patient not taking: Reported on 7/9/2021 5/10/21   Bel Sheppard MD   ferrous sulfate (FE TABS 325) 325 (65 Fe) MG EC tablet Take 1 tablet by mouth daily (with breakfast)  Patient not taking: Reported on 7/9/2021 3/12/21   ESTRELLITA Garcia - NP   aspirin 81 MG chewable tablet Take 81 mg by mouth nightly Pt not taking    Historical Provider, MD     Current Facility-Administered Medications   Medication Dose Route Frequency Provider Last Rate Last Admin    sertraline (ZOLOFT) tablet 100 mg  100 mg Oral Daily Mackenzie Almanzar MD   100 mg at 10/30/21 1045    oxyCODONE-acetaminophen (PERCOCET) 5-325 MG per tablet 1 tablet  1 tablet Oral Q4H PRN Thalia Velasco MD   1 tablet at 10/30/21 0641    morphine (MS CONTIN) extended release tablet 30 mg  30 mg Oral 2 times per day Thalia Velasco MD   30 mg at 10/30/21 0900    0.9 % sodium chloride infusion   IntraVENous PRN Alonzo Swartz MD        bisacodyl (DULCOLAX) suppository 10 mg  10 mg Rectal Daily PRN Thalia Velasco MD   10 mg at 10/29/21 0847    fentaNYL (SUBLIMAZE) injection 25 mcg  25 mcg IntraVENous Q2H PRN Nesha Burnette MD        fentaNYL (SUBLIMAZE) injection 50 mcg  50 mcg IntraVENous Q2H PRN Nesha Burnette MD   50 mcg at 10/30/21 0641    iron sucrose (VENOFER) 300 mg in sodium chloride 0.9 % 250 mL IVPB  300 mg IntraVENous Q24H Nelson Kulkarni MD   Stopped at 10/29/21 2010    0.9 % sodium chloride infusion   IntraVENous PRN Alonzo Swartz MD        sodium chloride flush 0.9 % injection 5-40 mL  5-40 mL IntraVENous 2 times per day Alonzo Swartz MD   10 mL at 10/30/21 0900    sodium chloride flush 0.9 % injection 5-40 mL  5-40 mL IntraVENous PRN Alonzo Swartz MD        0.9 % sodium chloride infusion  25 mL IntraVENous PRN Alonzo Swartz MD        ondansetron (ZOFRAN-ODT) disintegrating tablet 4 mg  4 mg Oral Q8H PRN Alonzo Swartz MD        Or    ondansetron (ZOFRAN) injection 4 mg  4 mg IntraVENous Q6H PRN Alonzo Swartz MD   4 mg at 10/30/21 0201    polyethylene glycol (GLYCOLAX) packet 17 g  17 g Oral Daily PRN Alonzo Swartz MD   17 g at 10/29/21 0847    acetaminophen (TYLENOL) tablet 650 mg  650 mg Oral Q6H PRN Alonzo Swartz MD        Or    acetaminophen (TYLENOL) suppository 650 mg  650 mg Rectal Q6H PRN Alonzo Swartz MD        pantoprazole (PROTONIX) injection 40 mg  40 mg IntraVENous Daily Alonzo Swartz MD   40 mg at 10/30/21 0859    And    sodium chloride (PF) 0.9 % injection 10 mL  10 mL IntraVENous Daily Alonzo Swartz MD   10 mL at 10/30/21 0929    levETIRAcetam (KEPPRA) tablet 1,500 mg  1,500 mg Oral BID Porfirio Hartmann MD   1,500 mg at 10/30/21 0858    lamoTRIgine (LAMICTAL) tablet 75 mg  75 mg Oral BID Porfirio Hartmann MD   75 mg at 10/30/21 0858    [Held by provider] enoxaparin (LOVENOX) injection 70 mg  1 mg/kg SubCUTAneous BID Porfirio Hartmann MD   70 mg at 10/30/21 0858    senna (SENOKOT) tablet 17.2 mg  2 tablet Oral Nightly Andrea Doss APRN - CNP   17.2 mg at 10/29/21 2000       Allergies:  Ceftriaxone    REVIEW OF SYSTEMS:      · General: No weakness or fatigue. No unanticipated weight loss or decreased appetite. No fever or chills. · Eyes: No blurred vision, eye pain or double vision. · Ears: No hearing problems or drainage. No tinnitus. · Throat: No sore throat, problems with swallowing or dysphagia. · Respiratory: No cough, sputum or hemoptysis. No shortness of breath. No pleuritic chest pain. · Cardiovascular: No chest pain, orthopnea or PND. No lower extremity edema. No palpitation. · Gastrointestinal: No problems with swallowing. No abdominal pain or bloating. No nausea or vomiting. No diarrhea or constipation. No GI bleeding. · Genitourinary: No dysuria, hematuria, frequency or urgency. · Musculoskeletal: No muscle aches or pains. No limitation of movement. No back pain. No gait disturbance, No joint complaints. · Dermatologic: No skin rashes or pruritus. No skin lesions or discolorations. · Psychiatric: No depression, anxiety, or stress or signs of schizophrenia. No change in mood or affect. · Hematologic: No history of bleeding tendency. No bruises or ecchymosis. No history of clotting problems. · Infectious disease: No fever, chills or frequent infections. · Endocrine: No polydipsia or polyuria. No temperature intolerance. · Neurologic: No headaches or dizziness. No weakness or numbness of the extremities. No changes in balance, coordination,  memory, mentation, behavior. · Allergic/Immunologic: No nasal congestion or hives. No repeated infections. PHYSICAL EXAM:      BP (!) 116/55   Pulse 103   Temp 97.2 °F (36.2 °C) (Oral)   Resp (!) 32   Ht 5' 5\" (1.651 m)   Wt 163 lb 5.8 oz (74.1 kg)   SpO2 96%   BMI 27.18 kg/m²    Temp (24hrs), Av.2 °F (36.8 °C), Min:97.2 °F (36.2 °C), Max:98.6 °F (37 °C)      General appearance - not in pain or distress  Mental status - alert and oriented  Eyes - pupils equal and reactive, extraocular eye movements intact  Ears - bilateral TM's and external ear canals normal  Nose - normal and patent, no erythema, discharge or polyps  Mouth - mucous membranes moist, pharynx normal without lesions  Neck - supple, no significant adenopathy  Lymphatics - no palpable lymphadenopathy, no hepatosplenomegaly  Chest - clear to auscultation, no wheezes, rales or rhonchi, symmetric air entry  Heart - normal rate, regular rhythm, normal S1, S2, no murmurs, rubs, clicks or gallops  Abdomen - soft, nontender, nondistended, no masses or organomegaly  Neurological - alert, oriented, normal speech, no focal findings or movement disorder noted  Musculoskeletal - no joint tenderness, deformity or swelling  Extremities - peripheral pulses normal, no pedal edema, no clubbing or cyanosis  Skin - normal coloration and turgor, no rashes, no suspicious skin lesions noted           DATA:      Labs:       CBC:   Recent Labs     10/28/21  1228 10/28/21  1707 10/29/21  0927 10/29/21  1547 10/30/21  0312 10/30/21  0945   WBC 10.5  --  12.2*  --   --   --    HGB 4.3*   < > 8.1*   < > 7.8* 8.6*   HCT 16.8*   < > 28.2*   < > 27.4* 31.3*   *  --  609*  --   --   --     < > = values in this interval not displayed. BMP:   Recent Labs     10/29/21  0927 10/30/21  1124    140   K 3.5* 3.5*   CO2 20 23   BUN 9 6   CREATININE 0.40* 0.52   LABGLOM >60 >60   GLUCOSE 107* 83     PT/INR: No results for input(s): PROTIME, INR in the last 72 hours.   APTT:No results for input(s): APTT in the last 72 hours. LIVER PROFILE:  Recent Labs     10/28/21  1228 10/29/21  0927   AST 7 10   ALT <5* <5*   LABALBU 3.4* 3.3*     CT ABDOMEN PELVIS W IV CONTRAST Additional Contrast? Radiologist Recommendation  Narrative: EXAMINATION:  CT OF THE ABDOMEN AND PELVIS WITH CONTRAST 10/29/2021 3:29 pm    TECHNIQUE:  CT of the abdomen and pelvis was performed with the administration of  intravenous contrast. Multiplanar reformatted images are provided for review. Dose modulation, iterative reconstruction, and/or weight based adjustment of  the mA/kV was utilized to reduce the radiation dose to as low as reasonably  achievable. COMPARISON:  CT from August 20, 2020    HISTORY:  ORDERING SYSTEM PROVIDED HISTORY: [place]oolow up on ovarian carcinom  TECHNOLOGIST PROVIDED HISTORY:  [place]oolow up on ovarian carcinom    Reason for Exam: Fatigue; Extremity Weakness  Acuity: Unknown  Type of Exam: Unknown    FINDINGS:  Lower Chest: Stable nodular densities in the medial right lung base and left  lower lobe. These measure up to 16 mm. Also seen are perihilar nodular  densities, also seen on exam from March 9, 2021, but measuring 16 mm versus  12 mm previously on August 20, 2020. No pleural effusion at the lung bases. Mild atelectasis. Organs:    Liver: Normal appearance of the liver. Gallbladder: Large calcified gallstone in the gallbladder. No  pericholecystic inflammatory changes or biliary ductal dilatation. Spleen: Heterogeneous calcifications near the spleen with postoperative clips  near the splenic hilum, limiting evaluation. Partially calcified lesion at  the inferior spleen, could be sequela of prior trauma. Pancreas: No peripancreatic inflammatory changes with the limitations of  streak artifact. Adrenal Glands: No focal adrenal abnormalities identified. Kidneys: No hydronephrosis. GI/Bowel:    Stomach: The stomach is nondistended.     Small bowel: No evidence of small bowel obstruction. Colon: Large amount of stool in the colon. Mucosal thickening is possible at  the rectum where there is mild wall thickening. Diffuse distention of the  colon, particularly the transverse colon. Appendix: Normal appearance of the appendix. Pelvis: Redemonstration of calcified masses in the pelvis measuring up to 3.9  cm, not significantly changed from prior study. No free fluid in the pelvis. Partially distended urinary bladder. Peritoneum/Retroperitoneum: Atherosclerosis of the aorta. Mildly prominent  left periaortic lymph nodes, could also be vascular in nature and unchanged  or smaller than prior study. Mildly prominent left inguinal lymph nodes are  unchanged. Postoperative findings at the ventral abdominal wall. Bones/Soft Tissues: Multiple sclerotic foci throughout the spine concerning  for osseous metastatic disease. This is stable compared to prior study. Impression: Diffuse distension of the colon. There is possible narrowing with mucosal  thickening at the rectum. Underlying mucosal abnormality is not excluded. Recommend correlation with direct visualization. No evidence of small-bowel  obstruction. Increased size of a left lower lobe pulmonary nodule. Of note are chronic  pulmonary nodules in the lung bases. Findings are concerning for worsened  metastatic disease in the chest.  Recommend correlation with dedicated CT  chest when clinical condition allows. Otherwise no acute findings or significant change from prior study. Stable calcified masses in the pelvis, could be sequela of known or treated  disease versus uterine fibroids. Stable osseous metastatic disease.             IMPRESSION:    Primary Problem  Acute on chronic anemia    Active Hospital Problems    Diagnosis Date Noted    Acute on chronic anemia [D64.9] 10/28/2021    History of deep venous thrombosis (DVT) of distal vein of left lower extremity: On Eliquis [Z86.718] 06/20/2021    Anemia [D64.9]     Seizure [address] [R56.9] 10/21/2020    Malignant neoplasm of ovary ([facility] [C56.9] 08/17/2020       ASSESSMENT:  1. Acute on chronic severe iron deficiency anemia likely secondary to chronic blood loss  2. Recurrent metastatic ovarian carcinoma with extensive intra-abdominal and peritoneal mets-on active chemotherapy  3. History of acute GI bleed from abdominal mass near splenic area s/p vascular embolization  4. History of DVT-on Eliquis      RECOMMENDATIONS:  1. Medical records, labs and imaging reviewed and discussed with the patient. 2. Gynecological malignancies are associated with a high risk of venous thromboembolism however due to concerns regarding active GI bleed continue to hold off on anticoagulation or pharmacological DVT prophylaxis until GI  bleeding ruled out  3. Transfusion to keep hemoglobin above 7  4. No plans for in-house chemotherapy  5. Follow-up on GI evaluation  6. CT showed very mild increase in the size of the pulmonary nodule. Will get dedicated CT chest as outpatient. 7. We will follow with you. 8. Continue symptomatic and supportive care      Discussed with patient and Nurse. Patricia Montoya MD          This note is created with the assistance of a speech recognition program.  While intending to generate a document that actually reflects the content of the visit, the document can still have some errors including those of syntax and sound a like substitutions which may escape proof reading. It such instances, actual meaning can be extrapolated by contextual diversion.

## 2021-10-30 NOTE — PROGRESS NOTES
[place]  Internal Medicine Teaching Residency Program  Inpatient Daily Progress Note  ______________________________________________________________________________    Patient: Malka Lantigua [facility]  YOB: 1965   [id number]:[id number]    Acct: [de-identified]     Room: [id number]  Admit date: 10/28/2021  Today's date: 10/30/21  Number of days in the hospital: 2    SUBJECTIVE   CC: Fatigue and Extremity Weakness    Pt examined at bedside. Chart & results reviewed. - vitals stable, pt is saturating well on 4L, increased from 2L   - labs reviewed   - no acute events overnight.   - Patient denies headache, vision problems, nausea, vomiting, chest pain, cough, abdominal pain, changes in bowel or urinary habits, and swelling. ROS:  General ROS: Completed and except as mentioned above were negative   HEENT ROS: Completed and except as mentioned above were negative   Allergy and Immunology ROS:  Completed and except as mentioned above were negative  Hematological and Lymphatic ROS:  Completed and except as mentioned above were negative  Respiratory ROS:  Completed and except as mentioned above were negative  Cardiovascular ROS:  Completed and except as mentioned above were negative  Gastrointestinal ROS: Completed and except as mentioned above were negative  Genito-Urinary ROS:  Completed and except as mentioned above were negative  Musculoskeletal ROS:  Completed and except as mentioned above were negative  Neurological ROS:  Completed and except as mentioned above were negative  Skin & Dermatological ROS:  Completed and except as mentioned above were negative  Psychological ROS:  Completed and except as mentioned above were negative  BRIEF HISTORY     he patient is a 57 y. o.female with PMH of recurrent ovarian cancer (initially diagnosed in 2005) receiving chemotherapy who presents to the ED today due to fatigue, weakness for the last couple of days.  Patient states it 163 lb 5.8 oz (74.1 kg)   SpO2 96%   BMI 27.18 kg/m²     Temp (24hrs), Av.2 °F (36.8 °C), Min:97.8 °F (36.6 °C), Max:98.6 °F (37 °C)    In: 1235.1   Out: -     Physical Exam:  Vitals reviewed. Constitutional:       General: She is awake.      Appearance: Normal appearance. She is normal weight. HENT:      Head: Normocephalic. Eyes:      General: Lids are normal.      Pupils: Pupils are equal, round, and reactive to light. Cardiovascular:      Rate and Rhythm: Normal rate and regular rhythm.      Pulses: Normal pulses.      Heart sounds: Normal heart sounds. Pulmonary:      Effort: Pulmonary effort is normal.      Breath sounds: Normal breath sounds and air entry. Musculoskeletal:      Right lower leg: No swelling.      Left lower leg: No swelling. Skin:     General: Skin is warm.      Capillary Refill: Capillary refill takes less than 2 seconds. Neurological:      Mental Status: She is alert and oriented to person, place, and time. Psychiatric:         Attention and Perception: Attention normal. Samuel Furlough and Affect: Mood normal.         Speech: Speech normal.         Behavior: Behavior is cooperative.   Medications:  Scheduled Medications:    morphine  30 mg Oral 2 times per day    iron sucrose  300 mg IntraVENous Q24H    sodium chloride flush  5-40 mL IntraVENous 2 times per day    pantoprazole  40 mg IntraVENous Daily    And    sodium chloride (PF)  10 mL IntraVENous Daily    levETIRAcetam  1,500 mg Oral BID    lamoTRIgine  75 mg Oral BID    enoxaparin  1 mg/kg SubCUTAneous BID    senna  2 tablet Oral Nightly     Continuous Infusions:    sodium chloride      sodium chloride      sodium chloride       PRN MedicationsoxyCODONE-acetaminophen, 1 tablet, Q4H PRN  sodium chloride, , PRN  bisacodyl, 10 mg, Daily PRN  fentanNYL, 25 mcg, Q2H PRN  fentanNYL, 50 mcg, Q2H PRN  sodium chloride, , PRN  sodium chloride flush, 5-40 mL, PRN  sodium chloride, 25 mL, PRN  ondansetron, 4 mg, Q8H PRN   Or  ondansetron, 4 mg, Q6H PRN  polyethylene glycol, 17 g, Daily PRN  acetaminophen, 650 mg, Q6H PRN   Or  acetaminophen, 650 mg, Q6H PRN        Diagnostic Labs:  CBC:   Recent Labs     10/28/21  1228 10/28/21  1707 10/29/21  0927 10/29/21  0927 10/29/21  1547 10/29/21  2206 10/30/21  0312   WBC 10.5  --  12.2*  --   --   --   --    RBC 2.29*  --  3.59*  --   --   --   --    HGB 4.3*   < > 8.1*   < > 8.3* 8.3* 7.8*   HCT 16.8*   < > 28.2*   < > 30.5* 28.5* 27.4*   MCV 73.4*  --  78.6*  --   --   --   --    RDW 22.7*  --  22.6*  --   --   --   --    *  --  609*  --   --   --   --     < > = values in this interval not displayed. BMP:   Recent Labs     10/28/21  1228 10/29/21  0927    137   K 3.9 3.5*    104   CO2 23 20   BUN 13 9   CREATININE 0.43* 0.40*     BNP: No results for input(s): BNP in the last 72 hours. PT/INR: No results for input(s): PROTIME, INR in the last 72 hours. APTT: No results for input(s): APTT in the last 72 hours. CARDIAC ENZYMES: No results for input(s): CKMB, CKMBINDEX, TROPONINI in the last 72 hours. Invalid input(s): CKTOTAL;3  FASTING LIPID PANEL:  Lab Results   Component Value Date    CHOL 131 03/10/2021    HDL 33 (L) 03/10/2021    TRIG 147 03/10/2021     LIVER PROFILE:   Recent Labs     10/28/21  1228 10/29/21  0927   AST 7 10   ALT <5* <5*   BILITOT <0.10* 0.39   ALKPHOS 58 61      MICROBIOLOGY:   Lab Results   Component Value Date/Time    CULTURE NO GROWTH 6 DAYS 06/21/2021 09:32 AM       Imaging:    CT ABDOMEN PELVIS W IV CONTRAST Additional Contrast? Radiologist Recommendation    Result Date: 10/29/2021  Diffuse distension of the colon. There is possible narrowing with mucosal thickening at the rectum. Underlying mucosal abnormality is not excluded. Recommend correlation with direct visualization. No evidence of small-bowel obstruction. Increased size of a left lower lobe pulmonary nodule. Of note are chronic pulmonary nodules in the lung bases. Findings are concerning for worsened metastatic disease in the chest.  Recommend correlation with dedicated CT chest when clinical condition allows. Otherwise no acute findings or significant change from prior study. Stable calcified masses in the pelvis, could be sequela of known or treated disease versus uterine fibroids. Stable osseous metastatic disease. ASSESSMENT & PLAN     IMPRESSION  This is a 57 y. o. female who presented with generalized fatigue and weakness and found to have decreased hemoglobin. Patient admitted to inpatient status for the management of acute anemia.     Acute on Chronic anemia likely secondary to chemotherapy vs cancer  -Hemoglobin above 7  -Monitor H&H  -transfuse if Hb below 7  -FOBT positive  -resume diet  -IV Protonix  -no signs of overt bleeding, monitor for symptoms  -Iron 7, ferritin 4  -GI consulted     History of seizure disorder  -continue home meds.      Essential hypertension  -Blood pressure WNL, will resume antihypertensive as needed     Constipation  -dulcolax suppository  -improved     History of DVT  -We will hold on Eliquis.   -hold lovenox, awaiting hem onc recommendations  -monitor for overt bleeding     History of ovarian cancer metastatic to the bone  -Receiving chemotherapy as outpatient  -Follows-up with Dr Willian Headley  -hem onc consulted     Thrombocytosis  - coag profile   - hem-onc consulted     DVT ppx: hold lovenox for now, consulted hem onc for anticoagulation   GI ppx: protonix  Case Management: consulted     Disposition: Will stay inpatient till Hb stabilizes and after possible flex sig and EGD    Romelle Apgar, MD  PGY-1, Internal Medicine Resident  [facility]         10/30/2021, 7:21 AM

## 2021-10-30 NOTE — PLAN OF CARE
Problem: Pain:  Goal: Pain level will decrease  Description: Pain level will decrease  Outcome: Ongoing  Goal: Control of acute pain  Description: Control of acute pain  Outcome: Ongoing  Goal: Control of chronic pain  Description: Control of chronic pain  Outcome: Ongoing     Problem:  Activity:  Goal: Risk for activity intolerance will decrease  Description: Risk for activity intolerance will decrease  Outcome: Ongoing  Goal: Ability to tolerate increased activity will improve  Description: Ability to tolerate increased activity will improve  Outcome: Ongoing  Goal: Ability to implement measures to reduce episodes of fatigue will improve  Description: Ability to implement measures to reduce episodes of fatigue will improve  Outcome: Ongoing     Problem: Coping:  Goal: Ability to adjust to condition or change in health will improve  Description: Ability to adjust to condition or change in health will improve  Outcome: Ongoing  Goal: Ability to cope will improve  Description: Ability to cope will improve  Outcome: Ongoing     Problem: Fluid Volume:  Goal: Ability to maintain a balanced intake and output will improve  Description: Ability to maintain a balanced intake and output will improve  Outcome: Ongoing     Problem: Health Behavior:  Goal: Ability to identify and utilize available resources and services will improve  Description: Ability to identify and utilize available resources and services will improve  Outcome: Ongoing  Goal: Ability to manage health-related needs will improve  Description: Ability to manage health-related needs will improve  Outcome: Ongoing  Goal: Adoption of positive health habits will improve  Description: Adoption of positive health habits will improve  Outcome: Ongoing  Goal: Identification of resources available to assist in meeting health care needs will improve  Description: Identification of resources available to assist in meeting health care needs will improve  Outcome: control plan will improve  Outcome: Ongoing

## 2021-10-30 NOTE — PROGRESS NOTES
Physical Therapy    Facility/Department: [facility] 4B STEPDOWN  Initial Assessment    NAME: Cassandra Hair  : 1965  MRN: [id number]    Date of Service: 10/30/2021    Discharge Recommendations: Further therapy recommended at discharge. Chief Complaint   Patient presents with    Fatigue    Extremity Weakness     The patient is a 60 y. o.female with PMH of recurrent ovarian cancer (initially diagnosed in ) receiving chemotherapy who presents to the ED due to fatigue, weakness for the last couple of days. Patient states it is generalized all over her body, denies any nausea, vomiting. Patient states that she has episode of dizziness when getting up from the bed, but denies any history of fall or syncope. Patient denies any tingling, numbness, any slurred speech. She also complains of intermittent SOB since all of this started. Patient never had a colonoscopy before, no family history of colon cancer. PT Equipment Recommendations  Equipment Needed: No    Assessment   Body structures, Functions, Activity limitations: Decreased functional mobility ; Decreased balance;Decreased ADL status; Decreased high-level IADLs;Decreased endurance;Decreased strength  Assessment: Pt ambulates 200 ft no AD CGA. Pt should be safe to return to prior living situation with intermittent support as needed. pt would benefit from continued therapy to promote endurance, balance, and strengthening. Prognosis: Good  Decision Making: Medium Complexity  PT Education: Goals;PT Role;Plan of Care  Barriers to Learning: none  REQUIRES PT FOLLOW UP: Yes  Activity Tolerance  Activity Tolerance: Patient limited by endurance       Patient Diagnosis(es): The primary encounter diagnosis was Anemia, unspecified type. Diagnoses of Abdominal pain, unspecified abdominal location and Constipation, unspecified constipation type were also pertinent to this visit.      has a past medical history of Anemia, Bleeding, Cervical cancer ([facility] [address] Depression, Diabetes mellitus ([facility] [address] GERD (gastroesophageal reflux disease), Hx of blood clots, Hypertension, Metastatic cancer ([facility] [address] Ovarian cancer ([facility] [address] Post chemo evaluation, and Splenic lesion. has a past surgical history that includes Hysterectomy, total abdominal; Port Surgery; Tonsillectomy; IR PORT PLACEMENT > 5 YEARS (08/24/2020); Anus surgery; Abscess Drainage (2013); colectomy (03/2013); IR EMBOLIZATION HEMORRHAGE (10/05/2020); and Cardiac catheterization. Restrictions  Restrictions/Precautions  Restrictions/Precautions: Fall Risk  Required Braces or Orthoses?: No  Position Activity Restriction  Other position/activity restrictions: up w A  Vision/Hearing  Vision: Impaired  Vision Exceptions: Wears glasses for distance  Hearing: Within functional limits     Subjective  General  Patient assessed for rehabilitation services?: Yes  Response To Previous Treatment: Not applicable  Family / Caregiver Present: No  Follows Commands: Within Functional Limits  Subjective  Subjective: RN and pt agreeable to PT. pt agreeable and pleasant. pt supine in bed at start of session. Pain Screening  Patient Currently in Pain: Yes  Pain Assessment  Pain Assessment: 0-10  Pain Level: 6  Pain Type: Acute pain  Pain Location: Abdomen;Back  Pain Orientation: Right;Left  Pain Descriptors: Aching;Discomfort  Pain Frequency: Continuous  Pain Onset: On-going  Non-Pharmaceutical Pain Intervention(s): Repositioned; Ambulation/Increased Activity  Response to Pain Intervention: Patient Satisfied  Vital Signs  Patient Currently in Pain: Yes       Orientation  Orientation  Overall Orientation Status: Within Functional Limits  Social/Functional History  Social/Functional History  Lives With: Son  Type of Home: House  Home Layout: Two level, Performs ADL's on one level, Able to Live on Main level with bedroom/bathroom  Home Access: Stairs to enter with rails  Entrance Stairs - Number of Steps: 4  Entrance Stairs - Rails: None  Bathroom Shower/Tub: Tub/Shower unit  Bathroom Toilet: Standard  Bathroom Equipment: Grab bars in shower, Shower chair  Home Equipment: Rolling walker, 4 wheeled walker (did not use AD prior to admit)  Receives Help From: Family  ADL Assistance: Needs assistance (daughter assists with bathing)  Homemaking Assistance: Needs assistance (ex significant other completes)  Homemaking Responsibilities: No  Ambulation Assistance: Independent  Transfer Assistance: Independent  Active : No  Patient's  Info: daughter drives  Occupation: On disability  Leisure & Hobbies: [employer]  Additional Comments: Son not able to provide much assistance d/t his own health issues.  Daughter lives nearby and able to assist PRN  Cognition   Cognition  Overall Cognitive Status: WFL    Objective          Joint Mobility  Spine: WFL  ROM RLE: WFL  ROM LLE: WFL  ROM RUE: WFL  ROM LUE: WFL  Strength RLE  Strength RLE: Exception  R Hip Flexion: 4/5  R Knee Flexion: 4+/5  R Knee Extension: 4+/5  R Ankle Dorsiflexion: 5/5  R Ankle Plantar flexion: 4+/5  Strength LLE  Strength LLE: Exception  L Hip Flexion: 4/5  L Knee Flexion: 4+/5  L Knee Extension: 4+/5  L Ankle Dorsiflexion: 5/5  L Ankle Plantar Flexion: 4+/5  Strength RUE  Strength RUE: WFL  Strength LUE  Strength LUE: WFL  Tone RLE  RLE Tone: Normotonic  Tone LLE  LLE Tone: Normotonic  Motor Control  Gross Motor?: WFL  Sensation  Overall Sensation Status: WFL (pt denies n/t)  Bed mobility  Supine to Sit: Stand by assistance  Sit to Supine: Stand by assistance  Scooting: Stand by assistance  Comment: HOB elevated  Transfers  Sit to Stand: Stand by assistance  Stand to sit: Stand by assistance  Comment: no device used  Ambulation  Ambulation?: Yes  More Ambulation?: No  Ambulation 1  Surface: level tile  Device: No Device  Assistance: Contact guard assistance  Gait Deviations: Slow Cynthia;Decreased step length;Decreased step height;Deviated path  Distance: 200 ft  Comments: Pt ambulates with overall steady gait, but does tend to deviate towards R wall. Able to correct with verbal cues. Stairs/Curb  Stairs?: No     Balance  Posture: Good  Sitting - Static: Good  Sitting - Dynamic: Good;-  Standing - Static: Fair;+  Standing - Dynamic: Fair  Comments: Assesed without AD        Plan   Plan  Times per week: 5x  Current Treatment Recommendations: Strengthening, Transfer Training, Endurance Training, Neuromuscular Re-education, Patient/Caregiver Education & Training, ADL/Self-care Training, Equipment Evaluation, Education, & procurement, Balance Training, IADL Training, Gait Training, Home Exercise Program, Functional Mobility Training, Stair training, Safety Education & Training  Safety Devices  Type of devices:  All fall risk precautions in place, Gait belt, Call light within reach, Left in bed, Nurse notified                          AM-PAC Score  AM-PAC Inpatient Mobility Raw Score : 19 (10/30/21 1151)  AM-PAC Inpatient T-Scale Score : 45.44 (10/30/21 1151)  Mobility Inpatient CMS 0-100% Score: 41.77 (10/30/21 1151)  Mobility Inpatient CMS G-Code Modifier : CK (10/30/21 1151)          Goals  Short term goals  Time Frame for Short term goals: 6 visits  Short term goal 1: Complete transfers independently  Short term goal 2: Complete 300 ft of gait independently  Short term goal 3: Complete 4 steps no HR independently  Short term goal 4: Participate in 30 minutes of therapy to promote endurance       Therapy Time   Individual Concurrent Group Co-treatment   Time In 0806         Time Out 0834         Minutes 28         Timed Code Treatment Minutes: 2017 Evelyne Ozuna, PT

## 2021-10-31 LAB
ANION GAP SERPL CALCULATED.3IONS-SCNC: 12 MMOL/L (ref 9–17)
BUN BLDV-MCNC: 6 MG/DL (ref 6–20)
BUN/CREAT BLD: ABNORMAL (ref 9–20)
CALCIUM SERPL-MCNC: 8.2 MG/DL (ref 8.6–10.4)
CHLORIDE BLD-SCNC: 105 MMOL/L (ref 98–107)
CO2: 23 MMOL/L (ref 20–31)
CREAT SERPL-MCNC: 0.37 MG/DL (ref 0.5–0.9)
GFR AFRICAN AMERICAN: >60 ML/MIN
GFR NON-AFRICAN AMERICAN: >60 ML/MIN
GFR SERPL CREATININE-BSD FRML MDRD: ABNORMAL ML/MIN/{1.73_M2}
GFR SERPL CREATININE-BSD FRML MDRD: ABNORMAL ML/MIN/{1.73_M2}
GLUCOSE BLD-MCNC: 81 MG/DL (ref 70–99)
HCT VFR BLD CALC: 29.4 % (ref 36.3–47.1)
HCT VFR BLD CALC: 29.7 % (ref 36.3–47.1)
HEMOGLOBIN: 7.8 G/DL (ref 11.9–15.1)
HEMOGLOBIN: 8 G/DL (ref 11.9–15.1)
MAGNESIUM: 1.9 MG/DL (ref 1.6–2.6)
POTASSIUM SERPL-SCNC: 3.4 MMOL/L (ref 3.7–5.3)
SODIUM BLD-SCNC: 140 MMOL/L (ref 135–144)

## 2021-10-31 PROCEDURE — 99232 SBSQ HOSP IP/OBS MODERATE 35: CPT | Performed by: INTERNAL MEDICINE

## 2021-10-31 PROCEDURE — 2060000000 HC ICU INTERMEDIATE R&B

## 2021-10-31 PROCEDURE — APPSS30 APP SPLIT SHARED TIME 16-30 MINUTES: Performed by: INTERNAL MEDICINE

## 2021-10-31 PROCEDURE — 96376 TX/PRO/DX INJ SAME DRUG ADON: CPT

## 2021-10-31 PROCEDURE — 85018 HEMOGLOBIN: CPT

## 2021-10-31 PROCEDURE — G0378 HOSPITAL OBSERVATION PER HR: HCPCS

## 2021-10-31 PROCEDURE — 83735 ASSAY OF MAGNESIUM: CPT

## 2021-10-31 PROCEDURE — 6360000002 HC RX W HCPCS: Performed by: STUDENT IN AN ORGANIZED HEALTH CARE EDUCATION/TRAINING PROGRAM

## 2021-10-31 PROCEDURE — C9113 INJ PANTOPRAZOLE SODIUM, VIA: HCPCS

## 2021-10-31 PROCEDURE — 6370000000 HC RX 637 (ALT 250 FOR IP): Performed by: NURSE PRACTITIONER

## 2021-10-31 PROCEDURE — 2580000003 HC RX 258

## 2021-10-31 PROCEDURE — 6370000000 HC RX 637 (ALT 250 FOR IP): Performed by: STUDENT IN AN ORGANIZED HEALTH CARE EDUCATION/TRAINING PROGRAM

## 2021-10-31 PROCEDURE — 6360000002 HC RX W HCPCS

## 2021-10-31 PROCEDURE — 6370000000 HC RX 637 (ALT 250 FOR IP)

## 2021-10-31 PROCEDURE — 80048 BASIC METABOLIC PNL TOTAL CA: CPT

## 2021-10-31 PROCEDURE — 85014 HEMATOCRIT: CPT

## 2021-10-31 PROCEDURE — 36415 COLL VENOUS BLD VENIPUNCTURE: CPT

## 2021-10-31 RX ORDER — LORAZEPAM 0.5 MG/1
0.5 TABLET ORAL
Status: COMPLETED | OUTPATIENT
Start: 2021-10-31 | End: 2021-10-31

## 2021-10-31 RX ORDER — POTASSIUM CHLORIDE 20 MEQ/1
40 TABLET, EXTENDED RELEASE ORAL 2 TIMES DAILY WITH MEALS
Status: DISCONTINUED | OUTPATIENT
Start: 2021-10-31 | End: 2021-11-01 | Stop reason: HOSPADM

## 2021-10-31 RX ORDER — PANTOPRAZOLE SODIUM 40 MG/1
40 TABLET, DELAYED RELEASE ORAL DAILY
Qty: 60 TABLET | Refills: 0 | Status: SHIPPED | OUTPATIENT
Start: 2021-10-31 | End: 2022-01-03 | Stop reason: SDUPTHER

## 2021-10-31 RX ORDER — FERROUS SULFATE 325(65) MG
325 TABLET ORAL
Qty: 30 TABLET | Refills: 2 | Status: SHIPPED | OUTPATIENT
Start: 2021-10-31 | End: 2022-09-08

## 2021-10-31 RX ADMIN — LAMOTRIGINE 75 MG: 25 TABLET ORAL at 10:28

## 2021-10-31 RX ADMIN — FENTANYL CITRATE 50 MCG: 50 INJECTION, SOLUTION INTRAMUSCULAR; INTRAVENOUS at 14:07

## 2021-10-31 RX ADMIN — PANTOPRAZOLE SODIUM 40 MG: 40 INJECTION, POWDER, FOR SOLUTION INTRAVENOUS at 10:27

## 2021-10-31 RX ADMIN — FENTANYL CITRATE 25 MCG: 50 INJECTION, SOLUTION INTRAMUSCULAR; INTRAVENOUS at 08:19

## 2021-10-31 RX ADMIN — SODIUM CHLORIDE, PRESERVATIVE FREE 10 ML: 5 INJECTION INTRAVENOUS at 20:00

## 2021-10-31 RX ADMIN — LEVETIRACETAM 1500 MG: 500 TABLET, FILM COATED ORAL at 10:27

## 2021-10-31 RX ADMIN — SODIUM CHLORIDE, PRESERVATIVE FREE 10 ML: 5 INJECTION INTRAVENOUS at 10:28

## 2021-10-31 RX ADMIN — POTASSIUM CHLORIDE 40 MEQ: 1500 TABLET, EXTENDED RELEASE ORAL at 14:04

## 2021-10-31 RX ADMIN — FENTANYL CITRATE 50 MCG: 50 INJECTION, SOLUTION INTRAMUSCULAR; INTRAVENOUS at 23:16

## 2021-10-31 RX ADMIN — SODIUM CHLORIDE, PRESERVATIVE FREE 10 ML: 5 INJECTION INTRAVENOUS at 10:29

## 2021-10-31 RX ADMIN — SENNOSIDES 17.2 MG: 8.6 TABLET, FILM COATED ORAL at 19:59

## 2021-10-31 RX ADMIN — MORPHINE SULFATE 30 MG: 15 TABLET, EXTENDED RELEASE ORAL at 10:27

## 2021-10-31 RX ADMIN — FENTANYL CITRATE 50 MCG: 50 INJECTION, SOLUTION INTRAMUSCULAR; INTRAVENOUS at 00:37

## 2021-10-31 RX ADMIN — OXYCODONE HYDROCHLORIDE AND ACETAMINOPHEN 1 TABLET: 5; 325 TABLET ORAL at 11:57

## 2021-10-31 RX ADMIN — FENTANYL CITRATE 50 MCG: 50 INJECTION, SOLUTION INTRAMUSCULAR; INTRAVENOUS at 19:59

## 2021-10-31 RX ADMIN — LAMOTRIGINE 75 MG: 25 TABLET ORAL at 19:59

## 2021-10-31 RX ADMIN — OXYCODONE HYDROCHLORIDE AND ACETAMINOPHEN 1 TABLET: 5; 325 TABLET ORAL at 16:45

## 2021-10-31 RX ADMIN — OXYCODONE HYDROCHLORIDE AND ACETAMINOPHEN 1 TABLET: 5; 325 TABLET ORAL at 01:26

## 2021-10-31 RX ADMIN — FENTANYL CITRATE 50 MCG: 50 INJECTION, SOLUTION INTRAMUSCULAR; INTRAVENOUS at 03:12

## 2021-10-31 RX ADMIN — MORPHINE SULFATE 30 MG: 15 TABLET, EXTENDED RELEASE ORAL at 20:00

## 2021-10-31 RX ADMIN — LORAZEPAM 0.5 MG: 0.5 TABLET ORAL at 23:16

## 2021-10-31 RX ADMIN — LEVETIRACETAM 1500 MG: 500 TABLET, FILM COATED ORAL at 19:59

## 2021-10-31 RX ADMIN — SERTRALINE 100 MG: 50 TABLET, FILM COATED ORAL at 10:29

## 2021-10-31 RX ADMIN — POTASSIUM CHLORIDE 40 MEQ: 1500 TABLET, EXTENDED RELEASE ORAL at 16:56

## 2021-10-31 ASSESSMENT — PAIN DESCRIPTION - PAIN TYPE
TYPE: ACUTE PAIN;CHRONIC PAIN
TYPE: ACUTE PAIN

## 2021-10-31 ASSESSMENT — PAIN DESCRIPTION - LOCATION
LOCATION: ABDOMEN;BACK
LOCATION: ABDOMEN;BACK

## 2021-10-31 ASSESSMENT — PAIN SCALES - GENERAL
PAINLEVEL_OUTOF10: 4
PAINLEVEL_OUTOF10: 8
PAINLEVEL_OUTOF10: 8
PAINLEVEL_OUTOF10: 4
PAINLEVEL_OUTOF10: 7
PAINLEVEL_OUTOF10: 8
PAINLEVEL_OUTOF10: 4
PAINLEVEL_OUTOF10: 8
PAINLEVEL_OUTOF10: 7
PAINLEVEL_OUTOF10: 7
PAINLEVEL_OUTOF10: 8
PAINLEVEL_OUTOF10: 8
PAINLEVEL_OUTOF10: 9
PAINLEVEL_OUTOF10: 8

## 2021-10-31 ASSESSMENT — PAIN DESCRIPTION - PROGRESSION

## 2021-10-31 ASSESSMENT — PAIN DESCRIPTION - ONSET: ONSET: ON-GOING

## 2021-10-31 NOTE — PLAN OF CARE
Problem: Pain:  Goal: Pain level will decrease  Description: Pain level will decrease  Outcome: Ongoing     Problem: Pain:  Goal: Control of acute pain  Description: Control of acute pain  Outcome: Not Met This Shift     Problem: Pain:  Goal: Control of chronic pain  Description: Control of chronic pain  Outcome: Ongoing     Problem: Activity:  Goal: Risk for activity intolerance will decrease  Description: Risk for activity intolerance will decrease  Outcome: Met This Shift     Problem: Activity:  Goal: Ability to tolerate increased activity will improve  Description: Ability to tolerate increased activity will improve  Outcome: Met This Shift     Problem:  Activity:  Goal: Ability to implement measures to reduce episodes of fatigue will improve  Description: Ability to implement measures to reduce episodes of fatigue will improve  Outcome: Met This Shift     Problem: Coping:  Goal: Ability to adjust to condition or change in health will improve  Description: Ability to adjust to condition or change in health will improve  Outcome: Met This Shift     Problem: Fluid Volume:  Goal: Ability to maintain a balanced intake and output will improve  Description: Ability to maintain a balanced intake and output will improve  Outcome: Met This Shift     Problem: Health Behavior:  Goal: Ability to identify and utilize available resources and services will improve  Description: Ability to identify and utilize available resources and services will improve  Outcome: Met This Shift     Problem: Health Behavior:  Goal: Ability to manage health-related needs will improve  Description: Ability to manage health-related needs will improve  Outcome: Met This Shift     Problem: Falls - Risk of:  Goal: Will remain free from falls  Description: Will remain free from falls  Outcome: Met This Shift     Problem: Falls - Risk of:  Goal: Absence of physical injury  Description: Absence of physical injury  Outcome: Met This Shift

## 2021-10-31 NOTE — PROGRESS NOTES
Today's Date: 10/31/2021  Patient Name: Brady Fischer  Date of admission: 10/28/2021 10:54 AM  Patient's age: 60 y.o., 1965  Admission Dx: Anemia [D64.9]  Abdominal pain, unspecified abdominal location [R10.9]  Constipation, unspecified constipation type [K59.00]  Anemia, unspecified type [D64.9]      Requesting Physician: Soo Mullins DO    CHIEF COMPLAINT: Abdominal pain, fatigue    History Obtained From:  patient, electronic medical record    Interval history:  No acute events overnight. Afebrile, hemodynamically stable  Hypokalemia potassium 3.4  Hemoglobin stable 7.8 this morning. Iron studies suggestive of iron deficiency anemia with iron 7, iron saturation 2, ferritin 4, TIBC 291. EGD and flex sig per GI. HISTORY OF PRESENT ILLNESS:      The patient is a 60 y.o. female with PMH of  -Recurrent metastatic ovarian carcinoma with intra-abdominal and peritoneal mets-on active chemotherapy  -Acute GI bleed s/p vascular embolization  -DVT-on Eliquis  -Chronic anemia. Chief complaint-generalized weakness. Who is admitted to the hospital for acute on chronic anemia. On admission, Hb was 4.3 s/p 2 units PRBC transfusion. Hb this morning 6.8, one 1 unit PRBC transfused. Patient is on chronic Eliquis for history of DVT. Hemoccult positive. No overt signs of bleeding. Has history of acute severe GI bleed requiring vascular embolization. 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 Underwent surgical debulking and was placed on systemic therapy with Taxol and carboplatin for 6 cycles. She was in remission for about 2 years and had a relapse of disease in 2007. Was treated with topotecan with good results. Patient relapsed again in 2008 and was placed on Taxol and carboplatin.   After 3 cycles of systemic chemotherapy with Taxol and carboplatin,  she could not tolerate carboplatin and finished the therapy with 3 more cycles of Taxol. She did well again for 2 to 3 years until she had another relapse in 2012 and she had intraperitoneal chemotherapy treatment with Taxol for which responded well. She lost follow-up with her oncologist after 2014 and moved to [place]. She presented with abdominal discomfort in 9/2020 and was found to have metastatic relapse with biopsy confirmed ovarian carcinoma. Scan showed extensive intra-abdominal and splenic involvement with lung mets. -Doxil/Avastin started 9/18/2020. After 1 cycle, Avastin discontinued due to GI bleed  -Intra-abdominal mass in splenic area with GI infiltration and acute GI bleed requiring multiple transfusions s/p vascular embolization  -Left leg DVT-placed on Eliquis. -Gemzar started 2/26/2021. On my evaluation:  Patient is awake alert and oriented x4. In severe painful distress. Hemodynamically stable. Afebrile. S/p 3 units PRBC transfused. Hemoglobin 8.1 this morning. MCV 73. Platelet count 360  Iron 7, ferritin 4, iron saturation 2, TIBC 291. Consistent with severe iron deficiency. CEA trending down. Past Medical History:   has a past medical history of Anemia, Bleeding, Cervical cancer ([address] Depression, Diabetes mellitus ([address] GERD (gastroesophageal reflux disease), Hx of blood clots, Hypertension, Metastatic cancer ([address] Ovarian cancer ([address] Post chemo evaluation, and Splenic lesion. Past Surgical History:   has a past surgical history that includes Hysterectomy, total abdominal; Port Surgery; Tonsillectomy; IR PORT PLACEMENT > 5 YEARS (08/24/2020); Anus surgery; Abscess Drainage (2013); colectomy (03/2013); IR EMBOLIZATION HEMORRHAGE (10/05/2020); and Cardiac catheterization. Family History: family history includes Alcohol Abuse in her mother; Cirrhosis in her mother. Social History:   reports that she has been smoking cigarettes. She has been smoking about 1.00 pack per day.  She uses smokeless tobacco. She reports previous alcohol use. She reports that she does not use drugs. Medications:    Prior to Admission medications    Medication Sig Start Date End Date Taking? Authorizing Provider   morphine (MSIR) 30 MG tablet Take 30 mg by mouth 2 times daily as needed for Pain.    Yes Historical Provider, MD   lamoTRIgine (LAMICTAL) 25 MG tablet TAKE 3 TABS IN IN THE MORNING AND 3 TABS IN IN THE EVENING 10/5/21  Yes Jr Kemp MD   sertraline (ZOLOFT) 100 MG tablet TAKE 1 TABLET BY MOUTH DAILY 10/4/21 11/3/21 Yes Solitario Bhatia MD   levETIRAcetam (KEPPRA) 750 MG tablet Take 2 tablets by mouth 2 times daily 7/12/21  Yes Kathy Esquivel MD   ELIQUIS 5 MG TABS tablet Take 5 mg by mouth 2 times daily  5/26/21  Yes Historical Provider, MD   oxyCODONE-acetaminophen (PERCOCET) 5-325 MG per tablet TAKE 1 TABLET BY MOUTH EVERY 4 HOURS AS NEEDED FOR PAIN (BREAKTHROUGH PAIN) - REDUCE DOSES TAKEN AS PAIN BECOMES MANAGEABLE 10/25/21 11/24/21  Rosy Kelley MD   dicyclomine (BENTYL) 20 MG tablet Take 1 tablet by mouth 3 times daily as needed (cramps)  Patient not taking: Reported on 9/17/2021 8/27/21   Rosy Kelley MD   ondansetron (ZOFRAN-ODT) 4 MG disintegrating tablet Take 1 tablet by mouth every 8 hours as needed for Nausea or Vomiting 6/23/21   Fred Pérez MD   pantoprazole (PROTONIX) 40 MG tablet Take 1 tablet by mouth 2 times daily  Patient not taking: Reported on 7/9/2021 5/10/21   Rosy Kelley MD   ferrous sulfate (FE TABS 325) 325 (65 Fe) MG EC tablet Take 1 tablet by mouth daily (with breakfast)  Patient not taking: Reported on 7/9/2021 3/12/21   ESTRELLITA Flores - NP   aspirin 81 MG chewable tablet Take 81 mg by mouth nightly Pt not taking    Historical Provider, MD     Current Facility-Administered Medications   Medication Dose Route Frequency Provider Last Rate Last Admin    potassium chloride (KLOR-CON M) extended release tablet 40 mEq  40 mEq Oral BID WC Miles Brown MD        sertraline (ZOLOFT) tablet 100 mg  100 mg Oral Daily Miles Brown MD   100 mg at 10/31/21 1029    QUEtiapine (SEROQUEL) tablet 12.5 mg  12.5 mg Oral Once Marcella Sequeira MD        oxyCODONE-acetaminophen (PERCOCET) 5-325 MG per tablet 1 tablet  1 tablet Oral Q4H PRN Pablo Valladares MD   1 tablet at 10/31/21 0126    morphine (MS CONTIN) extended release tablet 30 mg  30 mg Oral 2 times per day Pablo Valladares MD   30 mg at 10/31/21 1027    0.9 % sodium chloride infusion   IntraVENous PRN Miles Brown MD        bisacodyl (DULCOLAX) suppository 10 mg  10 mg Rectal Daily PRN Pablo Valladares MD   10 mg at 10/29/21 0847    fentaNYL (SUBLIMAZE) injection 25 mcg  25 mcg IntraVENous Q2H PRN Davis Perez MD   25 mcg at 10/31/21 0819    fentaNYL (SUBLIMAZE) injection 50 mcg  50 mcg IntraVENous Q2H PRN Davis Perez MD   50 mcg at 10/31/21 0312    0.9 % sodium chloride infusion   IntraVENous PRN Miles Brown MD        sodium chloride flush 0.9 % injection 5-40 mL  5-40 mL IntraVENous 2 times per day Miles Brown MD   10 mL at 10/31/21 1028    sodium chloride flush 0.9 % injection 5-40 mL  5-40 mL IntraVENous PRN Miles Brown MD        0.9 % sodium chloride infusion  25 mL IntraVENous PRN Miles Brown MD        ondansetron (ZOFRAN-ODT) disintegrating tablet 4 mg  4 mg Oral Q8H PRN Miles Brown MD        Or    ondansetron (ZOFRAN) injection 4 mg  4 mg IntraVENous Q6H PRN Miles Brown MD   4 mg at 10/30/21 0201    polyethylene glycol (GLYCOLAX) packet 17 g  17 g Oral Daily PRN Miles Brown MD   17 g at 10/30/21 1409    acetaminophen (TYLENOL) tablet 650 mg  650 mg Oral Q6H PRN Miles Brown MD        Or    acetaminophen (TYLENOL) suppository 650 mg  650 mg Rectal Q6H PRN Miles Brown MD        pantoprazole (PROTONIX) injection 40 mg  40 mg IntraVENous Daily Miles Brown MD   40 mg at 10/31/21 1027    And    sodium chloride (PF) 0.9 % injection 10 mL  10 mL IntraVENous Daily Jacobo Seaman MD   10 mL at 10/31/21 1029    levETIRAcetam (KEPPRA) tablet 1,500 mg  1,500 mg Oral BID Marlon Conde MD   1,500 mg at 10/31/21 1027    lamoTRIgine (LAMICTAL) tablet 75 mg  75 mg Oral BID Marlon Conde MD   75 mg at 10/31/21 1028    [Held by provider] enoxaparin (LOVENOX) injection 70 mg  1 mg/kg SubCUTAneous BID Marlon Conde MD   70 mg at 10/30/21 0858    senna (SENOKOT) tablet 17.2 mg  2 tablet Oral Nightly ESTRELLITA Obando CNP   17.2 mg at 10/30/21 2157       Allergies:  Ceftriaxone    REVIEW OF SYSTEMS:      · General: No weakness or fatigue. No unanticipated weight loss or decreased appetite. No fever or chills. · Eyes: No blurred vision, eye pain or double vision. · Ears: No hearing problems or drainage. No tinnitus. · Throat: No sore throat, problems with swallowing or dysphagia. · Respiratory: No cough, sputum or hemoptysis. No shortness of breath. No pleuritic chest pain. · Cardiovascular: No chest pain, orthopnea or PND. No lower extremity edema. No palpitation. · Gastrointestinal: No problems with swallowing. No abdominal pain or bloating. No nausea or vomiting. No diarrhea or constipation. No GI bleeding. · Genitourinary: No dysuria, hematuria, frequency or urgency. · Musculoskeletal: No muscle aches or pains. No limitation of movement. No back pain. No gait disturbance, No joint complaints. · Dermatologic: No skin rashes or pruritus. No skin lesions or discolorations. · Psychiatric: No depression, anxiety, or stress or signs of schizophrenia. No change in mood or affect. · Hematologic: No history of bleeding tendency. No bruises or ecchymosis. No history of clotting problems. · Infectious disease: No fever, chills or frequent infections. · Endocrine: No polydipsia or polyuria. No temperature intolerance.   · Neurologic: No headaches or dizziness. No weakness or numbness of the extremities. No changes in balance, coordination,  memory, mentation, behavior. · Allergic/Immunologic: No nasal congestion or hives. No repeated infections. PHYSICAL EXAM:      /63   Pulse 82   Temp 98.1 °F (36.7 °C) (Oral)   Resp 12   Ht 5' 5\" (1.651 m)   Wt 163 lb 5.8 oz (74.1 kg)   SpO2 96%   BMI 27.18 kg/m²    Temp (24hrs), Av.5 °F (36.9 °C), Min:98.1 °F (36.7 °C), Max:99.1 °F (37.3 °C)      General appearance -not in pain or distress  Mental status -alert and awake and oriented. Eyes - pupils equal and reactive, extraocular eye movements intact  Ears - bilateral TM's and external ear canals normal  Nose -normal and patent, no erythema, discharge or polyps  Mouth - mucous membranes moist, pharynx normal without lesions  Neck - supple, no significant adenopathy  Lymphatics - no palpable lymphadenopathy, no hepatosplenomegaly  Chest -clear on auscultation. No wheezes, rales or rhonchi, symmetric air entry  Heart - normal rate, regular rhythm, normal S1, S2, no murmurs, rubs, clicks or gallops  Abdomen -soft, nontender, nondistended, no masses or organomegaly  Neurological - alert, oriented, normal speech, no focal findings or movement disorder noted  Musculoskeletal - no joint tenderness, deformity or swelling  Extremities - peripheral pulses normal, no pedal edema, no clubbing or cyanosis  Skin - normal coloration and turgor, no rashes, no suspicious skin lesions noted     Labs:     CBC:   Recent Labs     10/28/21  1228 10/28/21  1707 10/29/21  0927 10/29/21  1547 10/30/21  2255 10/31/21  0711   WBC 10.5  --  12.2*  --   --   --    HGB 4.3*   < > 8.1*   < > 7.7* 7.8*   HCT 16.8*   < > 28.2*   < > 27.3* 29.4*   *  --  609*  --   --   --     < > = values in this interval not displayed.      BMP:   Recent Labs     10/30/21  1124 10/31/21  0711    140   K 3.5* 3.4*   CO2 23 23   BUN 6 6   CREATININE 0.52 0.37*   LABGLOM >60 >60   GLUCOSE 83 81     PT/INR: No results for input(s): PROTIME, INR in the last 72 hours. APTT:No results for input(s): APTT in the last 72 hours. LIVER PROFILE:  Recent Labs     10/28/21  1228 10/29/21  0927   AST 7 10   ALT <5* <5*   LABALBU 3.4* 3.3*     CT ABDOMEN PELVIS W IV CONTRAST Additional Contrast? Radiologist Recommendation  Narrative: EXAMINATION:  CT OF THE ABDOMEN AND PELVIS WITH CONTRAST 10/29/2021 3:29 pm    TECHNIQUE:  CT of the abdomen and pelvis was performed with the administration of  intravenous contrast. Multiplanar reformatted images are provided for review. Dose modulation, iterative reconstruction, and/or weight based adjustment of  the mA/kV was utilized to reduce the radiation dose to as low as reasonably  achievable. COMPARISON:  CT from August 20, 2020    HISTORY:  ORDERING SYSTEM PROVIDED HISTORY: [place]oolow up on ovarian carcinom  TECHNOLOGIST PROVIDED HISTORY:  [place]oolow up on ovarian carcinom    Reason for Exam: Fatigue; Extremity Weakness  Acuity: Unknown  Type of Exam: Unknown    FINDINGS:  Lower Chest: Stable nodular densities in the medial right lung base and left  lower lobe. These measure up to 16 mm. Also seen are perihilar nodular  densities, also seen on exam from March 9, 2021, but measuring 16 mm versus  12 mm previously on August 20, 2020. No pleural effusion at the lung bases. Mild atelectasis. Organs:    Liver: Normal appearance of the liver. Gallbladder: Large calcified gallstone in the gallbladder. No  pericholecystic inflammatory changes or biliary ductal dilatation. Spleen: Heterogeneous calcifications near the spleen with postoperative clips  near the splenic hilum, limiting evaluation. Partially calcified lesion at  the inferior spleen, could be sequela of prior trauma. Pancreas: No peripancreatic inflammatory changes with the limitations of  streak artifact. Adrenal Glands: No focal adrenal abnormalities identified.     Kidneys: No hydronephrosis. GI/Bowel:    Stomach: The stomach is nondistended. Small bowel: No evidence of small bowel obstruction. Colon: Large amount of stool in the colon. Mucosal thickening is possible at  the rectum where there is mild wall thickening. Diffuse distention of the  colon, particularly the transverse colon. Appendix: Normal appearance of the appendix. Pelvis: Redemonstration of calcified masses in the pelvis measuring up to 3.9  cm, not significantly changed from prior study. No free fluid in the pelvis. Partially distended urinary bladder. Peritoneum/Retroperitoneum: Atherosclerosis of the aorta. Mildly prominent  left periaortic lymph nodes, could also be vascular in nature and unchanged  or smaller than prior study. Mildly prominent left inguinal lymph nodes are  unchanged. Postoperative findings at the ventral abdominal wall. Bones/Soft Tissues: Multiple sclerotic foci throughout the spine concerning  for osseous metastatic disease. This is stable compared to prior study. Impression: Diffuse distension of the colon. There is possible narrowing with mucosal  thickening at the rectum. Underlying mucosal abnormality is not excluded. Recommend correlation with direct visualization. No evidence of small-bowel  obstruction. Increased size of a left lower lobe pulmonary nodule. Of note are chronic  pulmonary nodules in the lung bases. Findings are concerning for worsened  metastatic disease in the chest.  Recommend correlation with dedicated CT  chest when clinical condition allows. Otherwise no acute findings or significant change from prior study. Stable calcified masses in the pelvis, could be sequela of known or treated  disease versus uterine fibroids. Stable osseous metastatic disease.     IMPRESSION:    Primary Problem  Acute on chronic anemia    Active Hospital Problems    Diagnosis Date Noted    Acute on chronic anemia [D64.9] 10/28/2021    History of deep venous thrombosis (DVT) of distal vein of left lower extremity: On Eliquis [Z86.718] 06/20/2021    Anemia [D64.9]     Se[address] [address] [R56.9] 10/21/2020    Malignant neoplasm of ovary ([facility] [address] [C56.9] 08/17/2020       ASSESSMENT:  1. Acute on chronic severe iron deficiency anemia likely secondary to chronic blood loss  2. Recurrent metastatic ovarian carcinoma with extensive intra-abdominal and peritoneal mets-on active chemotherapy  3. History of acute GI bleed from abdominal mass near splenic area s/p vascular embolization  4. History of DVT-on Eliquis      RECOMMENDATIONS:  1. Medical records, labs and imaging reviewed and discussed with the patient. 2. Gynecological malignancies are associated with a high risk of venous thromboembolism however due to concerns regarding active GI bleed continue to hold off on anticoagulation or pharmacological DVT prophylaxis until GI  bleeding ruled out. No plan for flexible sigmoidoscopy and endoscopy per GI.  3. Transfusion to keep hemoglobin above 7  4. No plans for in-house chemotherapy  5. CT showed very mild increase in the size of the pulmonary nodule. Will get dedicated CT chest as outpatient. 6. We will follow with you. 7. Continue symptomatic and supportive care      Discussed with patient and Nurse. Shanita Barrios  Internal Medicine Resident  [address]  10/31/2021 11:30 AM      Attending Physician Statement  The patient was seen and examined during rounds, I have discussed the care of Wan Acosta, including pertinent history and exam findings with the resident. I have reviewed the key elements of all parts of the encounter with the resident. I agree with the assessment, and status of the problem list as documented. Additional assessment/ plan    Discussed with medicine team.  Per discussion medicine team checked with GI team and they are okay with restarting anticoagulation.   Okay to restart Eliquis     Electronically signed by Ramses Toney MD    on 10/31/2021 at 8:13 PM              This note is created with the assistance of a speech recognition program.  While intending to generate a document that actually reflects the content of the visit, the document can still have some errors including those of syntax and sound a like substitutions which may escape proof reading. It such instances, actual meaning can be extrapolated by contextual diversion.

## 2021-10-31 NOTE — PROGRESS NOTES
[place]  Internal Medicine Teaching Residency Program  Inpatient Daily Progress Note  ______________________________________________________________________________    Patient: Andrew [facility]  YOB: 1965   FHF:[id number]    Acct: [de-identified]     Room: [facility]  Admit date: 10/28/2021  Today's date: 10/31/21  Number of days in the hospital: 3    SUBJECTIVE   CC: Fatigue and Extremity Weakness    Pt examined at bedside. Chart & results reviewed. - vitals stable  - labs reviewed   - no acute events overnight.   - Patient denies headache, vision problems, nausea, vomiting, chest pain, cough, abdominal pain, changes in bowel or urinary habits, and swelling. ROS:  General ROS: Completed and except as mentioned above were negative   HEENT ROS: Completed and except as mentioned above were negative   Allergy and Immunology ROS:  Completed and except as mentioned above were negative  Hematological and Lymphatic ROS:  Completed and except as mentioned above were negative  Respiratory ROS:  Completed and except as mentioned above were negative  Cardiovascular ROS:  Completed and except as mentioned above were negative  Gastrointestinal ROS: Completed and except as mentioned above were negative  Genito-Urinary ROS:  Completed and except as mentioned above were negative  Musculoskeletal ROS:  Completed and except as mentioned above were negative  Neurological ROS:  Completed and except as mentioned above were negative  Skin & Dermatological ROS:  Completed and except as mentioned above were negative  Psychological ROS:  Completed and except as mentioned above were negative  BRIEF HISTORY   The patient is a 57 y. o.female with PMH of recurrent ovarian cancer (initially diagnosed in 2005) receiving chemotherapy who presents to the ED today due to fatigue, weakness for the last couple of days.  Patient states it is generalized all over her body, denies any nausea, vomiting. Patient states that she has episode of dizziness when getting up from the bed, but denies any history of fall or syncope. Patient denies any tingling, numbness, any slurred speech. Patient also states that she has been constipated and her last bowel movement was 4 to 5 days ago which is new for her. She also complains of intermittent SOB since all of this started. Patient never had a colonoscopy before, no family history of colon cancer.     On arrival to the ED, patient heart rate was in the higher 90s, other vitals were stable and was saturating at 87% on room air so she was placed on 3 L NC. Pertinent labs were ordered which showed a hemoglobin of 4.3, Plt was above 700. Blood transfusion was ordered.      When I went to evaluate the patient patient was lying comfortably in the bed, with 3 L nasal cannula. Patient denied headache, vision problems, nausea, vomiting, chest pain, cough, abdominal pain, changes in bowel or urinary habits, and swelling.     Patient follows up with Dr. Burke Mcdowell for her recurrent ovarian cancer, patient was on Doxil/Avastin which was started on 9/18/2020 which was discontinued due to GI bleed.  Patient was started on Gemzar on 2/26/2021. Mattie Dawson did not receive it due to hospitalization.      Recent Hospitalization:  -6/24: Was admitted for status epilepticus, stable on discharge  -3/12: Patient was admitted for anemia required 1 unit packed RBC.     PMH:  -Recurrent ovarian cancer initially diagnosed in 2005 metastasis to the bone, receiving chemotherapy.   -DVT (1/2021)-on Eliquis  -History of intra-abdominal bleed due to splenic mass s/p coiling and embolization 10/2020.  -Hypertension  -Seizure disorder        Social:  -Tobacco: 1 pack/day  -Alcohol: Negative  -Illicit Drug Use: Negative    OBJECTIVE     Vital Signs:  BP (!) 107/55   Pulse 82   Temp 98.1 °F (36.7 °C) (Oral)   Resp 12   Ht 5' 5\" (1.651 m)   Wt 163 lb 5.8 oz (74.1 kg)   SpO2 96%   BMI 27.18 kg/m²     Temp (24hrs), Av.3 °F (36.8 °C), Min:97.2 °F (36.2 °C), Max:99.1 °F (37.3 °C)    No intake/output data recorded. Physical Exam:  Vitals reviewed. Constitutional:       General: She is awake.      Appearance: Normal appearance. She is normal weight. HENT:      Head: Normocephalic. Eyes:      General: Lids are normal.      Pupils: Pupils are equal, round, and reactive to light. Cardiovascular:      Rate and Rhythm: Normal rate and regular rhythm.      Pulses: Normal pulses.      Heart sounds: Normal heart sounds. Pulmonary:      Effort: Pulmonary effort is normal.      Breath sounds: Normal breath sounds and air entry. Musculoskeletal:      Right lower leg: No swelling.      Left lower leg: No swelling. Skin:     General: Skin is warm.      Capillary Refill: Capillary refill takes less than 2 seconds. Neurological:      Mental Status: She is alert and oriented to person, place, and time. Psychiatric:         Attention and Perception: Attention normal. Lurdes Pae and Affect: Mood normal.         Speech: Speech normal.         Behavior: Behavior is cooperative.     Medications:  Scheduled Medications:    sertraline  100 mg Oral Daily    QUEtiapine  12.5 mg Oral Once    morphine  30 mg Oral 2 times per day    sodium chloride flush  5-40 mL IntraVENous 2 times per day    pantoprazole  40 mg IntraVENous Daily    And    sodium chloride (PF)  10 mL IntraVENous Daily    levETIRAcetam  1,500 mg Oral BID    lamoTRIgine  75 mg Oral BID    [Held by provider] enoxaparin  1 mg/kg SubCUTAneous BID    senna  2 tablet Oral Nightly     Continuous Infusions:    sodium chloride      sodium chloride      sodium chloride       PRN MedicationsoxyCODONE-acetaminophen, 1 tablet, Q4H PRN  sodium chloride, , PRN  bisacodyl, 10 mg, Daily PRN  fentanNYL, 25 mcg, Q2H PRN  fentanNYL, 50 mcg, Q2H PRN  sodium chloride, , PRN  sodium chloride flush, 5-40 mL, PRN  sodium chloride, 25 mL, PRN  ondansetron, 4 mg, Q8H PRN   Or  ondansetron, 4 mg, Q6H PRN  polyethylene glycol, 17 g, Daily PRN  acetaminophen, 650 mg, Q6H PRN   Or  acetaminophen, 650 mg, Q6H PRN        Diagnostic Labs:  CBC:   Recent Labs     10/28/21  1228 10/28/21  1707 10/29/21  0927 10/29/21  1547 10/30/21  0945 10/30/21  1624 10/30/21  2255   WBC 10.5  --  12.2*  --   --   --   --    RBC 2.29*  --  3.59*  --   --   --   --    HGB 4.3*   < > 8.1*   < > 8.6* 8.3* 7.7*   HCT 16.8*   < > 28.2*   < > 31.3* 29.9* 27.3*   MCV 73.4*  --  78.6*  --   --   --   --    RDW 22.7*  --  22.6*  --   --   --   --    *  --  609*  --   --   --   --     < > = values in this interval not displayed. BMP:   Recent Labs     10/28/21  1228 10/29/21  0927 10/30/21  1124    137 140   K 3.9 3.5* 3.5*    104 104   CO2 23 20 23   BUN 13 9 6   CREATININE 0.43* 0.40* 0.52     BNP: No results for input(s): BNP in the last 72 hours. PT/INR: No results for input(s): PROTIME, INR in the last 72 hours. APTT: No results for input(s): APTT in the last 72 hours. CARDIAC ENZYMES: No results for input(s): CKMB, CKMBINDEX, TROPONINI in the last 72 hours. Invalid input(s): CKTOTAL;3  FASTING LIPID PANEL:  Lab Results   Component Value Date    CHOL 131 03/10/2021    HDL 33 (L) 03/10/2021    TRIG 147 03/10/2021     LIVER PROFILE:   Recent Labs     10/28/21  1228 10/29/21  0927   AST 7 10   ALT <5* <5*   BILITOT <0.10* 0.39   ALKPHOS 58 61      MICROBIOLOGY:   Lab Results   Component Value Date/Time    CULTURE NO GROWTH 6 DAYS 06/21/2021 09:32 AM       Imaging:    CT ABDOMEN PELVIS W IV CONTRAST Additional Contrast? Radiologist Recommendation    Result Date: 10/29/2021  Diffuse distension of the colon. There is possible narrowing with mucosal thickening at the rectum. Underlying mucosal abnormality is not excluded. Recommend correlation with direct visualization. No evidence of small-bowel obstruction.  Increased size of a left lower lobe pulmonary nodule. Of note are chronic pulmonary nodules in the lung bases. Findings are concerning for worsened metastatic disease in the chest.  Recommend correlation with dedicated CT chest when clinical condition allows. Otherwise no acute findings or significant change from prior study. Stable calcified masses in the pelvis, could be sequela of known or treated disease versus uterine fibroids. Stable osseous metastatic disease. ASSESSMENT & PLAN        IMPRESSION  This is a 57 y. o. female who presented with generalized fatigue and weakness and found to have decreased hemoglobin.  Patient admitted to inpatient status for the management of acute anemia.     Acute on Chronic anemia likely secondary to chemotherapy vs cancer  -Hb stable  -Monitor H&H  -transfuse if Hb below 7  -FOBT was  positive  -IV Protonix  -no signs of overt bleeding, monitor for symptoms  -GI consulted, appreciate their recommendations     History of seizure disorder  -continue home meds.      Essential hypertension  -Blood pressure WNL, will resume antihypertensive as needed     Constipation  -dulcolax suppository  -improved     History of DVT  -hold off on anticoags   -monitor for overt bleeding     History of ovarian cancer metastatic to the bone  -Receiving chemotherapy as outpatient  -Follows-up with Dr Chatman [place]  -hem onc consulted     Thrombocytosis  -coag profile   -hem-onc consulted     DVT ppx: hold off on anticoagulation  GI ppx: protonix  Case Management: consulted     Disposition: Will stay inpatient till Hb stabilizes and after possible flex sig and EGD    Kiersten Bucio MD  PGY-1, Internal Medicine Resident  [facility]         10/31/2021, 7:36 AM

## 2021-10-31 NOTE — PROGRESS NOTES
10/29/21  5449 10/29/21  1547 10/30/21  0312 10/30/21  0945 10/30/21  1624 10/30/21  2255 10/31/21  0711   WBC 12.2*  --   --   --   --   --   --    HGB 8.1*   < > 7.8* 8.6* 8.3* 7.7* 7.8*   HCT 28.2*   < > 27.4* 31.3* 29.9* 27.3* 29.4*   MCV 78.6*  --   --   --   --   --   --    MCHC 28.7  --   --   --   --   --   --    RDW 22.6*  --   --   --   --   --   --    *  --   --   --   --   --   --     < > = values in this interval not displayed. Immature PLTs   Lab Results   Component Value Date    PLTFLUORE 1,172 06/23/2021    PLTFLUORE 1,225 06/22/2021    PLTFLUORE 1,172 06/21/2021       ANEMIA STUDIES  No results for input(s): LABIRON, TIBC, IRON, FERRITIN, UHQRYVEQ24, FOLATE, OCCULTBLD in the last 72 hours. BMP  Recent Labs     10/29/21  0927 10/30/21  1124 10/31/21  0711    140 140   K 3.5* 3.5* 3.4*    104 105   CO2 20 23 23   BUN 9 6 6   CREATININE 0.40* 0.52 0.37*   GLUCOSE 107* 83 81   CALCIUM 8.0* 8.2* 8.2*       LFTS  Recent Labs     10/29/21  0927   ALKPHOS 61   ALT <5*   AST 10   BILITOT 0.39   LABALBU 3.3*       AMYLASE/LIPASE/AMMONIA  No results for input(s): AMYLASE, LIPASE, AMMONIA in the last 72 hours.     Acute Hepatitis Panel   No results found for: HEPBSAG, HEPCAB, HEPBIGM, HEPAIGM    HCV Genotype   No results found for: HEPATITISCGENOTYPE    HCV Quantitative   No results found for: HCVQNT    LIVER WORK UP:    AFP  No results found for: AFP    Alpha 1 antitrypsin   No results found for: A1A    Anti - Liver/Kidney Ab  No results found for: LIVER-KIDNEYMICROSOMALAB    GAGE  No results found for: GAGE    AMA  No results found for: MITOAB    ASMA  No results found for: SMOOTHMUSCAB    Ceruloplasmin  No results found for: CERULOPLSM    Celiac panel  No results found for: TISSTRNTIIGG, TTGIGA, IGA    IgG  Lab Results   Component Value Date     10/22/2020       IgM  No results found for: IGM    GGT   No results found for: LABGGT    PT/INR  No results for input(s): PROTIME, INR in the last 72 hours. Cancer Markers:  CEA:  No results for input(s): CEA in the last 72 hours. Ca 125:  No results for input(s):  in the last 72 hours. Ca 19-9:   No results for input(s):  in the last 72 hours. AFP: No results for input(s): AFP in the last 72 hours. Lactic acid:No results for input(s): LACTACIDWB in the last 72 hours. Radiology Review:    CT ABDOMEN PELVIS W IV CONTRAST Additional Contrast? Radiologist Recommendation    Result Date: 10/29/2021  EXAMINATION: CT OF THE ABDOMEN AND PELVIS WITH CONTRAST 10/29/2021 3:29 pm TECHNIQUE: CT of the abdomen and pelvis was performed with the administration of intravenous contrast. Multiplanar reformatted images are provided for review. Dose modulation, iterative reconstruction, and/or weight based adjustment of the mA/kV was utilized to reduce the radiation dose to as low as reasonably achievable. COMPARISON: CT from August 20, 2020 HISTORY: ORDERING SYSTEM PROVIDED HISTORY: Foolow up on ovarian carcinom TECHNOLOGIST PROVIDED HISTORY: Foolow up on ovarian carcinom Reason for Exam: Fatigue; Extremity Weakness Acuity: Unknown Type of Exam: Unknown FINDINGS: Lower Chest: Stable nodular densities in the medial right lung base and left lower lobe. These measure up to 16 mm. Also seen are perihilar nodular densities, also seen on exam from March 9, 2021, but measuring 16 mm versus 12 mm previously on August 20, 2020. No pleural effusion at the lung bases. Mild atelectasis. Organs: Liver: Normal appearance of the liver. Gallbladder: Large calcified gallstone in the gallbladder. No pericholecystic inflammatory changes or biliary ductal dilatation. Spleen: Heterogeneous calcifications near the spleen with postoperative clips near the splenic hilum, limiting evaluation. Partially calcified lesion at the inferior spleen, could be sequela of prior trauma.  Pancreas: No peripancreatic inflammatory changes with the limitations of streak artifact. Adrenal Glands: No focal adrenal abnormalities identified. Kidneys: No hydronephrosis. GI/Bowel: Stomach: The stomach is nondistended. Small bowel: No evidence of small bowel obstruction. Colon: Large amount of stool in the colon. Mucosal thickening is possible at the rectum where there is mild wall thickening. Diffuse distention of the colon, particularly the transverse colon. Appendix: Normal appearance of the appendix. Pelvis: Redemonstration of calcified masses in the pelvis measuring up to 3.9 cm, not significantly changed from prior study. No free fluid in the pelvis. Partially distended urinary bladder. Peritoneum/Retroperitoneum: Atherosclerosis of the aorta. Mildly prominent left periaortic lymph nodes, could also be vascular in nature and unchanged or smaller than prior study. Mildly prominent left inguinal lymph nodes are unchanged. Postoperative findings at the ventral abdominal wall. Bones/Soft Tissues: Multiple sclerotic foci throughout the spine concerning for osseous metastatic disease. This is stable compared to prior study. Diffuse distension of the colon. There is possible narrowing with mucosal thickening at the rectum. Underlying mucosal abnormality is not excluded. Recommend correlation with direct visualization. No evidence of small-bowel obstruction. Increased size of a left lower lobe pulmonary nodule. Of note are chronic pulmonary nodules in the lung bases. Findings are concerning for worsened metastatic disease in the chest.  Recommend correlation with dedicated CT chest when clinical condition allows. Otherwise no acute findings or significant change from prior study. Stable calcified masses in the pelvis, could be sequela of known or treated disease versus uterine fibroids. Stable osseous metastatic disease.          ENDOSCOPY     Principal Problem:    Acute on chronic anemia  Active Problems:    Malignant neoplasm of ovary (HCC)    Seizure (HCC)    Anemia History of deep venous thrombosis (DVT) of distal vein of left lower extremity: On Eliquis  Resolved Problems:    * No resolved hospital problems. *       GI Assessment:    1. Acute on chronic anemia - likely multifactorial - denies any overt GI bleeding  2. Abnormal CT findings -colonic distention and possible narrowing with mucosal thickening at rectum. Likely secondary to previous colonoscopy findings in 2020 showing high grade stricture - suspected due to adhesions from previous abdominal surgery. Given hx of CA, can look for CRC  3. Recurrent metastatic ovarian carcinoma  4. History of DVT on long-term anticoagulation -Eliquis           Plan of care:  1. Diet as tolerated  2. No plans to repeat EGD and/or flex sigmoidoscopy given findings on EGD and colonoscopy in 2020. Plan discussed with patient who is in agreement. 3. Effective laxation -start Movantik for opioid-induced constipation  4. Okay to be discharged from GI perspective   5. GI will sign off        This plan was formulated in collaboration with   Please feel free to contact me with any questions or concerns. Thank you for allowing me to participate in the care of your patient. ESTRELLITA Schumacher - CNP on 10/31/2021 at 1:47 PM   [facility]    Please note that this note was generated using a voice recognition dictation software. Although every effort was made to ensure the accuracy of this automated transcription, some errors in transcription may have occurred.

## 2021-11-01 VITALS
BODY MASS INDEX: 27.22 KG/M2 | SYSTOLIC BLOOD PRESSURE: 130 MMHG | OXYGEN SATURATION: 100 % | HEART RATE: 74 BPM | WEIGHT: 163.36 LBS | HEIGHT: 65 IN | DIASTOLIC BLOOD PRESSURE: 59 MMHG | TEMPERATURE: 97.7 F | RESPIRATION RATE: 13 BRPM

## 2021-11-01 LAB
ABO/RH: NORMAL
ANION GAP SERPL CALCULATED.3IONS-SCNC: 9 MMOL/L (ref 9–17)
ANTIBODY IDENTIFICATION: NORMAL
ANTIBODY SCREEN: NORMAL
ARM BAND NUMBER: NORMAL
BLD PROD TYP BPU: NORMAL
BUN BLDV-MCNC: 6 MG/DL (ref 6–20)
BUN/CREAT BLD: ABNORMAL (ref 9–20)
CALCIUM SERPL-MCNC: 8.2 MG/DL (ref 8.6–10.4)
CHLORIDE BLD-SCNC: 105 MMOL/L (ref 98–107)
CO2: 24 MMOL/L (ref 20–31)
CREAT SERPL-MCNC: 0.39 MG/DL (ref 0.5–0.9)
CROSSMATCH RESULT: NORMAL
DAT IGG: NEGATIVE
DISPENSE STATUS BLOOD BANK: NORMAL
EXPIRATION DATE: NORMAL
GFR AFRICAN AMERICAN: >60 ML/MIN
GFR NON-AFRICAN AMERICAN: >60 ML/MIN
GFR SERPL CREATININE-BSD FRML MDRD: ABNORMAL ML/MIN/{1.73_M2}
GFR SERPL CREATININE-BSD FRML MDRD: ABNORMAL ML/MIN/{1.73_M2}
GLUCOSE BLD-MCNC: 95 MG/DL (ref 70–99)
HCT VFR BLD CALC: 30.4 % (ref 36.3–47.1)
HEMOGLOBIN: 8.3 G/DL (ref 11.9–15.1)
POTASSIUM SERPL-SCNC: 4.2 MMOL/L (ref 3.7–5.3)
SODIUM BLD-SCNC: 138 MMOL/L (ref 135–144)
TRANSFUSION STATUS: NORMAL
UNIT DIVISION: 0
UNIT NUMBER: NORMAL

## 2021-11-01 PROCEDURE — 6360000002 HC RX W HCPCS: Performed by: STUDENT IN AN ORGANIZED HEALTH CARE EDUCATION/TRAINING PROGRAM

## 2021-11-01 PROCEDURE — 85018 HEMOGLOBIN: CPT

## 2021-11-01 PROCEDURE — 36415 COLL VENOUS BLD VENIPUNCTURE: CPT

## 2021-11-01 PROCEDURE — 6360000002 HC RX W HCPCS

## 2021-11-01 PROCEDURE — 80048 BASIC METABOLIC PNL TOTAL CA: CPT

## 2021-11-01 PROCEDURE — 6370000000 HC RX 637 (ALT 250 FOR IP)

## 2021-11-01 PROCEDURE — 96376 TX/PRO/DX INJ SAME DRUG ADON: CPT

## 2021-11-01 PROCEDURE — 85014 HEMATOCRIT: CPT

## 2021-11-01 PROCEDURE — 6370000000 HC RX 637 (ALT 250 FOR IP): Performed by: INTERNAL MEDICINE

## 2021-11-01 PROCEDURE — 2580000003 HC RX 258

## 2021-11-01 PROCEDURE — 99239 HOSP IP/OBS DSCHRG MGMT >30: CPT | Performed by: INTERNAL MEDICINE

## 2021-11-01 PROCEDURE — 6370000000 HC RX 637 (ALT 250 FOR IP): Performed by: STUDENT IN AN ORGANIZED HEALTH CARE EDUCATION/TRAINING PROGRAM

## 2021-11-01 PROCEDURE — G0378 HOSPITAL OBSERVATION PER HR: HCPCS

## 2021-11-01 PROCEDURE — C9113 INJ PANTOPRAZOLE SODIUM, VIA: HCPCS

## 2021-11-01 RX ORDER — HEPARIN SODIUM,PORCINE 10 UNIT/ML
3 VIAL (ML) INTRAVENOUS PRN
Status: DISCONTINUED | OUTPATIENT
Start: 2021-11-01 | End: 2021-11-01

## 2021-11-01 RX ORDER — HEPARIN SODIUM (PORCINE) LOCK FLUSH IV SOLN 100 UNIT/ML 100 UNIT/ML
100 SOLUTION INTRAVENOUS PRN
Status: DISCONTINUED | OUTPATIENT
Start: 2021-11-01 | End: 2021-11-01 | Stop reason: HOSPADM

## 2021-11-01 RX ORDER — LORAZEPAM 0.5 MG/1
0.5 TABLET ORAL EVERY 8 HOURS PRN
Qty: 10 TABLET | Refills: 0 | Status: SHIPPED | OUTPATIENT
Start: 2021-11-01 | End: 2021-11-05 | Stop reason: SDUPTHER

## 2021-11-01 RX ADMIN — FENTANYL CITRATE 50 MCG: 50 INJECTION, SOLUTION INTRAMUSCULAR; INTRAVENOUS at 05:36

## 2021-11-01 RX ADMIN — LAMOTRIGINE 75 MG: 25 TABLET ORAL at 07:50

## 2021-11-01 RX ADMIN — HEPARIN 100 UNITS: 100 SYRINGE at 12:50

## 2021-11-01 RX ADMIN — POTASSIUM CHLORIDE 40 MEQ: 1500 TABLET, EXTENDED RELEASE ORAL at 07:50

## 2021-11-01 RX ADMIN — LEVETIRACETAM 1500 MG: 500 TABLET, FILM COATED ORAL at 07:50

## 2021-11-01 RX ADMIN — SODIUM CHLORIDE, PRESERVATIVE FREE 10 ML: 5 INJECTION INTRAVENOUS at 07:51

## 2021-11-01 RX ADMIN — NALOXEGOL OXALATE 12.5 MG: 12.5 TABLET, FILM COATED ORAL at 09:13

## 2021-11-01 RX ADMIN — SERTRALINE 100 MG: 50 TABLET, FILM COATED ORAL at 07:50

## 2021-11-01 RX ADMIN — PANTOPRAZOLE SODIUM 40 MG: 40 INJECTION, POWDER, FOR SOLUTION INTRAVENOUS at 07:51

## 2021-11-01 RX ADMIN — FENTANYL CITRATE 50 MCG: 50 INJECTION, SOLUTION INTRAMUSCULAR; INTRAVENOUS at 09:13

## 2021-11-01 RX ADMIN — MORPHINE SULFATE 30 MG: 15 TABLET, EXTENDED RELEASE ORAL at 07:50

## 2021-11-01 ASSESSMENT — PAIN SCALES - GENERAL
PAINLEVEL_OUTOF10: 6
PAINLEVEL_OUTOF10: 8
PAINLEVEL_OUTOF10: 7
PAINLEVEL_OUTOF10: 7

## 2021-11-01 ASSESSMENT — PAIN DESCRIPTION - PROGRESSION

## 2021-11-01 ASSESSMENT — PAIN DESCRIPTION - PAIN TYPE: TYPE: ACUTE PAIN

## 2021-11-01 ASSESSMENT — PAIN DESCRIPTION - ORIENTATION: ORIENTATION: LOWER

## 2021-11-01 ASSESSMENT — PAIN DESCRIPTION - LOCATION: LOCATION: BACK

## 2021-11-01 NOTE — PROGRESS NOTES
CLINICAL PHARMACY NOTE: MEDS TO BEDS    Total # of Prescriptions Filled: 3   The following medications were delivered to the patient:  · Lorazepam  · Ferosul  · protonix    Additional Documentation:

## 2021-11-01 NOTE — CARE COORDINATION
Attending Supervising Physicians Attestation Statement  I have seen and examined Pushpa Fishman [facility] and the key elements of all parts of the encounter have been performed by me. I agree with the assessment, plan and orders as documented by the Advanced Practice Provider. I discussed the findings and plans with the resident physician and agree as documented in his note  with these exceptions. Additional Comments:   1. Microcytic anemialikely from chronic blood loss, normotensive. Check ferritin. May benefit from IV iron, initial hemoglobin of 4.3. Patient states previously was due to chemotherapy, appreciate recommendations from hematology  2. Ovarian canceroutpatient chemotherapy with Dr. Sherry Santiago, previously was started on Avastin and Doxil which resulted in a GI bleed, started on Gemzar. Consult oncology for further recommendations  3. Plan: Monitor hemoglobin overnight, if stable possible discharge next 24 to 48 hours. Check iron studies and replace with IV iron if needed. Patient has a FOBT that is positive but does have history of hemorrhoids, if there is concern for new GI bleed will consult gastroenterology, for now we'll continue to monitor.     Electronically signed by Elvin Grant DO on 10/28/2021 at 7:15 PM
Call from pharmacy for prior off for d/c medication       naloxegol (MOVANTIK) 12.5 MG TABS tablet [[id number]]     Order Details  Dose: 12.5 mg Route: Oral Frequency: EVERY MORNING   Dispense Quantity: 30 tablet Refills: 1          Sig: Take 1 tablet by mouth every morning         Start Date: 11/01/21 End Date: --   Written Date: 10/31/21 Expiration Date: 10/31/22     Key: [id number]      Prior auth sent via Cover My Meds
Transitional Planning    Reviewed PT/OT notes. Spoke with pt. She does not feel that she needs home care anymore at this time.  No needs from case management
for PT/OT evals          Electronically signed by Kai Peter RN on 10/29/21 at 8:58 AM EDT

## 2021-11-01 NOTE — PROGRESS NOTES
Today's Date: 11/1/2021  Patient Name: Aida Govea  Date of admission: 10/28/2021 10:54 AM  Patient's age: 60 y.o., 1965  Admission Dx: Anemia [D64.9]  Abdominal pain, unspecified abdominal location [R10.9]  Constipation, unspecified constipation type [K59.00]  Anemia, unspecified type [D64.9]      Requesting Physician: Sabas Forrest DO    CHIEF COMPLAINT: Abdominal pain, fatigue    History Obtained From:  patient, electronic medical record    Interval history:  No acute events overnight. Afebrile, hemodynamically stable. VSS  Hypokalemia potassium 3.4  Hemoglobin stable 8.3  Iron studies suggestive of iron deficiency anemia with iron 7, iron saturation 2, ferritin 4, TIBC 291. No EGD or flex sig this admission per GI. HISTORY OF PRESENT ILLNESS:      The patient is a 60 y.o. female with PMH of  -Recurrent metastatic ovarian carcinoma with intra-abdominal and peritoneal mets-on active chemotherapy  -Acute GI bleed s/p vascular embolization  -DVT-on Eliquis  -Chronic anemia. Chief complaint-generalized weakness. Who is admitted to the hospital for acute on chronic anemia. On admission, Hb was 4.3 s/p 2 units PRBC transfusion. Hb this morning 6.8, one 1 unit PRBC transfused. Patient is on chronic Eliquis for history of DVT. Hemoccult positive. No overt signs of bleeding. Has history of acute severe GI bleed requiring vascular embolization. 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 Underwent surgical debulking and was placed on systemic therapy with Taxol and carboplatin for 6 cycles. She was in remission for about 2 years and had a relapse of disease in 2007. Was treated with topotecan with good results. Patient relapsed again in 2008 and was placed on Taxol and carboplatin.   After 3 cycles of systemic chemotherapy with Taxol and carboplatin,  she could not tolerate carboplatin and finished the therapy with 3 more cycles of Taxol. She did well again for 2 to 3 years until she had another relapse in 2012 and she had intraperitoneal chemotherapy treatment with Taxol for which responded well. She lost follow-up with her oncologist after 2014 and moved to [place]. She presented with abdominal discomfort in 9/2020 and was found to have metastatic relapse with biopsy confirmed ovarian carcinoma. Scan showed extensive intra-abdominal and splenic involvement with lung mets. -Doxil/Avastin started 9/18/2020. After 1 cycle, Avastin discontinued due to GI bleed  -Intra-abdominal mass in splenic area with GI infiltration and acute GI bleed requiring multiple transfusions s/p vascular embolization  -Left leg DVT-placed on Eliquis. -Gemzar started 2/26/2021. On my evaluation:  Patient is awake alert and oriented x4. In severe painful distress. Hemodynamically stable. Afebrile. S/p 3 units PRBC transfused. Hemoglobin 8.1 this morning. MCV 73. Platelet count 134  Iron 7, ferritin 4, iron saturation 2, TIBC 291. Consistent with severe iron deficiency. CEA trending down. Past Medical History:   has a past medical history of Anemia, Bleeding, Cervical cancer ([address] Depression, Diabetes mellitus ([address] GERD (gastroesophageal reflux disease), Hx of blood clots, Hypertension, Metastatic cancer ([address] Ovarian cancer ([address] Post chemo evaluation, and Splenic lesion. Past Surgical History:   has a past surgical history that includes Hysterectomy, total abdominal; Port Surgery; Tonsillectomy; IR PORT PLACEMENT > 5 YEARS (08/24/2020); Anus surgery; Abscess Drainage (2013); colectomy (03/2013); IR EMBOLIZATION HEMORRHAGE (10/05/2020); and Cardiac catheterization. Family History: family history includes Alcohol Abuse in her mother; Cirrhosis in her mother. Social History:   reports that she has been smoking cigarettes. She has been smoking about 1.00 pack per day.  She uses smokeless tobacco. She reports previous alcohol use. She reports that she does not use drugs. Medications:    Prior to Admission medications    Medication Sig Start Date End Date Taking? Authorizing Provider   LORazepam (ATIVAN) 0.5 MG tablet Take 1 tablet by mouth every 8 hours as needed for Anxiety for up to 10 doses. 11/1/21 11/7/21 Yes Primo Lara MD   naloxegol (MOVANTIK) 12.5 MG TABS tablet Take 1 tablet by mouth every morning 11/1/21  Yes Primo Lara MD   pantoprazole (PROTONIX) 40 MG tablet Take 1 tablet by mouth daily 10/31/21  Yes Primo Lara MD   ferrous sulfate (IRON 325) 325 (65 Fe) MG tablet Take 1 tablet by mouth daily (with breakfast) 10/31/21  Yes Primo Lara MD   morphine (MSIR) 30 MG tablet Take 30 mg by mouth 2 times daily as needed for Pain.    Yes Historical Provider, MD   lamoTRIgine (LAMICTAL) 25 MG tablet TAKE 3 TABS IN IN THE MORNING AND 3 TABS IN IN THE EVENING 10/5/21  Yes Jr Ortiz MD   sertraline (ZOLOFT) 100 MG tablet TAKE 1 TABLET BY MOUTH DAILY 10/4/21 11/3/21 Yes Mark Bhatia MD   levETIRAcetam (KEPPRA) 750 MG tablet Take 2 tablets by mouth 2 times daily 7/12/21  Yes Eber Nesbitt MD   ELIQUIS 5 MG TABS tablet Take 5 mg by mouth 2 times daily  5/26/21  Yes Historical Provider, MD   oxyCODONE-acetaminophen (PERCOCET) 5-325 MG per tablet TAKE 1 TABLET BY MOUTH EVERY 4 HOURS AS NEEDED FOR PAIN (BREAKTHROUGH PAIN) - REDUCE DOSES TAKEN AS PAIN BECOMES MANAGEABLE 10/25/21 11/24/21  Aurora Brooks MD   ondansetron (ZOFRAN-ODT) 4 MG disintegrating tablet Take 1 tablet by mouth every 8 hours as needed for Nausea or Vomiting 6/23/21   Amber Rothman MD     Current Facility-Administered Medications   Medication Dose Route Frequency Provider Last Rate Last Admin    heparin flush (PF) 10 UNIT/ML injection 30 Units  3 mL IntraVENous PRN Primo Lara MD        potassium chloride (KLOR-CON M) extended release tablet 40 mEq  40 mEq Oral BID  Maribeth Vogt, MD   40 mEq at 11/01/21 0750    naloxegol (MOVANTIK) tablet 12.5 mg  12.5 mg Oral QAM ESTRELLITA Johnson - CNP   12.5 mg at 11/01/21 0913    sertraline (ZOLOFT) tablet 100 mg  100 mg Oral Daily Aida Looney MD   100 mg at 11/01/21 0750    QUEtiapine (SEROQUEL) tablet 12.5 mg  12.5 mg Oral Once Michele Galvez MD        oxyCODONE-acetaminophen (PERCOCET) 5-325 MG per tablet 1 tablet  1 tablet Oral Q4H PRN Savanna Albright MD   1 tablet at 10/31/21 1645    morphine (MS CONTIN) extended release tablet 30 mg  30 mg Oral 2 times per day Savanna Albright MD   30 mg at 11/01/21 0750    0.9 % sodium chloride infusion   IntraVENous PRN Aida Looney MD        bisacodyl (DULCOLAX) suppository 10 mg  10 mg Rectal Daily PRN Savanna Albright MD   10 mg at 10/29/21 0847    fentaNYL (SUBLIMAZE) injection 25 mcg  25 mcg IntraVENous Q2H PRN Philip Bishop MD   25 mcg at 10/31/21 0819    fentaNYL (SUBLIMAZE) injection 50 mcg  50 mcg IntraVENous Q2H PRN Philip Bishop MD   50 mcg at 11/01/21 0913    0.9 % sodium chloride infusion   IntraVENous PRN Aida Looney MD        sodium chloride flush 0.9 % injection 5-40 mL  5-40 mL IntraVENous 2 times per day Aida Looney MD   10 mL at 11/01/21 0751    sodium chloride flush 0.9 % injection 5-40 mL  5-40 mL IntraVENous PRN Aida Looney MD        0.9 % sodium chloride infusion  25 mL IntraVENous PRN Aida Looney MD        ondansetron (ZOFRAN-ODT) disintegrating tablet 4 mg  4 mg Oral Q8H PRN Aida Looney MD        Or    ondansetron (ZOFRAN) injection 4 mg  4 mg IntraVENous Q6H PRN Aida Looney MD   4 mg at 10/30/21 0201    polyethylene glycol (GLYCOLAX) packet 17 g  17 g Oral Daily PRN Aida Looney MD   17 g at 10/30/21 1409    acetaminophen (TYLENOL) tablet 650 mg  650 mg Oral Q6H PRN Aida Looney MD        Or    acetaminophen (TYLENOL) suppository 650 mg  650 mg Rectal Q6H PRN Aida Looney MD pantoprazole (PROTONIX) injection 40 mg  40 mg IntraVENous Daily Claudia Fisher MD   40 mg at 11/01/21 0751    And    sodium chloride (PF) 0.9 % injection 10 mL  10 mL IntraVENous Daily Claudia Fisher MD   10 mL at 11/01/21 0751    levETIRAcetam (KEPPRA) tablet 1,500 mg  1,500 mg Oral BID Craig Alvarenga MD   1,500 mg at 11/01/21 0750    lamoTRIgine (LAMICTAL) tablet 75 mg  75 mg Oral BID Craig Alvarenga MD   75 mg at 11/01/21 0750    [Held by provider] enoxaparin (LOVENOX) injection 70 mg  1 mg/kg SubCUTAneous BID Craig Alvarenga MD   70 mg at 10/30/21 0858    senna (SENOKOT) tablet 17.2 mg  2 tablet Oral Nightly Rhina NataliiaESTRELLITA - CNP   17.2 mg at 10/31/21 1959       Allergies:  Ceftriaxone    REVIEW OF SYSTEMS:      General: No weakness or fatigue. No unanticipated weight loss or decreased appetite. No fever or chills. Eyes: No blurred vision, eye pain or double vision. Ears: No hearing problems or drainage. No tinnitus. Throat: No sore throat, problems with swallowing or dysphagia. Respiratory: No cough, sputum or hemoptysis. No shortness of breath. No pleuritic chest pain. Cardiovascular: No chest pain, orthopnea or PND. No lower extremity edema. No palpitation. Gastrointestinal: No problems with swallowing. No abdominal pain or bloating. No nausea or vomiting. No diarrhea or constipation. No GI bleeding. Genitourinary: No dysuria, hematuria, frequency or urgency. Musculoskeletal: No muscle aches or pains. No limitation of movement. No back pain. No gait disturbance, No joint complaints. Dermatologic: No skin rashes or pruritus. No skin lesions or discolorations. Psychiatric: No depression, anxiety, or stress or signs of schizophrenia. No change in mood or affect. Hematologic: No history of bleeding tendency. No bruises or ecchymosis. No history of clotting problems. Infectious disease: No fever, chills or frequent infections.    Endocrine: No polydipsia or polyuria. No temperature intolerance. Neurologic: No headaches or dizziness. No weakness or numbness of the extremities. No changes in balance, coordination,  memory, mentation, behavior. Allergic/Immunologic: No nasal congestion or hives. No repeated infections. PHYSICAL EXAM:      BP (!) 130/59   Pulse 74   Temp 97.7 °F (36.5 °C) (Temporal)   Resp 13   Ht 5' 5\" (1.651 m)   Wt 163 lb 5.8 oz (74.1 kg)   SpO2 100%   BMI 27.18 kg/m²    Temp (24hrs), Av.8 °F (36.6 °C), Min:97.7 °F (36.5 °C), Max:97.9 °F (36.6 °C)      General appearance -not in pain or distress, resting comfortable in bed  Mental status -alert and awake and oriented. Eyes - pupils equal and reactive, extraocular eye movements intact  Ears - bilateral TM's and external ear canals normal  Nose -normal and patent, no erythema, discharge or polyps  Mouth - mucous membranes moist, pharynx normal without lesions  Neck - supple, no significant adenopathy  Lymphatics - no palpable lymphadenopathy, no hepatosplenomegaly  Chest -clear on auscultation. No wheezes, rales or rhonchi, symmetric air entry  Heart - normal rate, regular rhythm, normal S1, S2, no murmurs, rubs, clicks or gallops  Abdomen -soft, nontender, nondistended, no masses or organomegaly  Neurological - alert, oriented, normal speech, no focal findings or movement disorder noted  Musculoskeletal - no joint tenderness, deformity or swelling  Extremities - peripheral pulses normal, no pedal edema, no clubbing or cyanosis  Skin - normal coloration and turgor, no rashes, no suspicious skin lesions noted     Labs:     CBC:   Recent Labs     10/31/21  1403 21  0649   HGB 8.0* 8.3*   HCT 29.7* 30.4*     BMP:   Recent Labs     10/31/21  0711 21  0649    138   K 3.4* 4.2   CO2 23 24   BUN 6 6   CREATININE 0.37* 0.39*   LABGLOM >60 >60   GLUCOSE 81 95     PT/INR: No results for input(s): PROTIME, INR in the last 72 hours. APTT:No results for input(s):  APTT in the last 72 hours. LIVER PROFILE:  No results for input(s): AST, ALT, LABALBU in the last 72 hours. CT ABDOMEN PELVIS W IV CONTRAST Additional Contrast? Radiologist Recommendation  Narrative: EXAMINATION:  CT OF THE ABDOMEN AND PELVIS WITH CONTRAST 10/29/2021 3:29 pm    TECHNIQUE:  CT of the abdomen and pelvis was performed with the administration of  intravenous contrast. Multiplanar reformatted images are provided for review. Dose modulation, iterative reconstruction, and/or weight based adjustment of  the mA/kV was utilized to reduce the radiation dose to as low as reasonably  achievable. COMPARISON:  CT from August 20, 2020    HISTORY:  ORDERING SYSTEM PROVIDED HISTORY: [place]ow up on ovarian carcinom  TECHNOLOGIST PROVIDED HISTORY:  [place]oolow up on ovarian carcinom    Reason for Exam: Fatigue; Extremity Weakness  Acuity: Unknown  Type of Exam: Unknown    FINDINGS:  Lower Chest: Stable nodular densities in the medial right lung base and left  lower lobe. These measure up to 16 mm. Also seen are perihilar nodular  densities, also seen on exam from March 9, 2021, but measuring 16 mm versus  12 mm previously on August 20, 2020. No pleural effusion at the lung bases. Mild atelectasis. Organs:    Liver: Normal appearance of the liver. Gallbladder: Large calcified gallstone in the gallbladder. No  pericholecystic inflammatory changes or biliary ductal dilatation. Spleen: Heterogeneous calcifications near the spleen with postoperative clips  near the splenic hilum, limiting evaluation. Partially calcified lesion at  the inferior spleen, could be sequela of prior trauma. Pancreas: No peripancreatic inflammatory changes with the limitations of  streak artifact. Adrenal Glands: No focal adrenal abnormalities identified. Kidneys: No hydronephrosis. GI/Bowel:    Stomach: The stomach is nondistended. Small bowel: No evidence of small bowel obstruction. Colon: Large amount of stool in the colon. Mucosal thickening is possible at  the rectum where there is mild wall thickening. Diffuse distention of the  colon, particularly the transverse colon. Appendix: Normal appearance of the appendix. Pelvis: Redemonstration of calcified masses in the pelvis measuring up to 3.9  cm, not significantly changed from prior study. No free fluid in the pelvis. Partially distended urinary bladder. Peritoneum/Retroperitoneum: Atherosclerosis of the aorta. Mildly prominent  left periaortic lymph nodes, could also be vascular in nature and unchanged  or smaller than prior study. Mildly prominent left inguinal lymph nodes are  unchanged. Postoperative findings at the ventral abdominal wall. Bones/Soft Tissues: Multiple sclerotic foci throughout the spine concerning  for osseous metastatic disease. This is stable compared to prior study. Impression: Diffuse distension of the colon. There is possible narrowing with mucosal  thickening at the rectum. Underlying mucosal abnormality is not excluded. Recommend correlation with direct visualization. No evidence of small-bowel  obstruction. Increased size of a left lower lobe pulmonary nodule. Of note are chronic  pulmonary nodules in the lung bases. Findings are concerning for worsened  metastatic disease in the chest.  Recommend correlation with dedicated CT  chest when clinical condition allows. Otherwise no acute findings or significant change from prior study. Stable calcified masses in the pelvis, could be sequela of known or treated  disease versus uterine fibroids. Stable osseous metastatic disease.     IMPRESSION:    Primary Problem  Acute on chronic anemia    Active Hospital Problems    Diagnosis Date Noted    Acute on chronic anemia [D64.9] 10/28/2021    History of deep venous thrombosis (DVT) of distal vein of left lower extremity: On Eliquis [Z86.718] 06/20/2021    Anemia [D64.9]     Seizure ([address] [R56.9] 10/21/2020    Malignant neoplasm of ovary ([facility] [address] [C56.9] 08/17/2020       ASSESSMENT:  Acute on chronic severe iron deficiency anemia likely secondary to chronic blood loss  Recurrent metastatic ovarian carcinoma with extensive intra-abdominal and peritoneal mets-on active chemotherapy  History of acute GI bleed from abdominal mass near splenic area s/p vascular embolization  History of DVT-on Eliquis      RECOMMENDATIONS:  Medical records, labs and imaging reviewed and discussed with the patient. Gynecological malignancies are associated with a high risk of venous thromboembolism however due to concerns regarding active GI bleed continue to hold off on anticoagulation or pharmacological DVT prophylaxis until GI  bleeding ruled out. No plan for flexible sigmoidoscopy and endoscopy per GI. Transfusion to keep hemoglobin above 7  No plans for in-house chemotherapy  CT showed very mild increase in the size of the pulmonary nodule. Will get dedicated CT chest as outpatient. We will follow with you. Continue symptomatic and supportive care  GI ok with restarting eliquis as per the discussion yesterday. Ok to restart eliquis      Discussed with patient and Nurse. [address]  Internal Medicine Resident  Charlene Toure  11/1/2021 11:25 AM    Attending Physician Statement  The patient was seen and examined during rounds, I have discussed the care of Aida Govea, including pertinent history and exam findings with the resident. I have reviewed the key elements of all parts of the encounter with the resident. I agree with the assessment, and status of the problem list as documented.    Additional assessment/ plan       Electronically signed by   Julissa Lozano MD    on 11/10/2021 at 5:46 PM        This note is created with the assistance of a speech recognition program.  While intending to generate a document that actually reflects the content of the visit, the document can still have some errors including those of syntax and sound a like substitutions which may escape proof reading. It such instances, actual meaning can be extrapolated by contextual diversion.

## 2021-11-01 NOTE — PLAN OF CARE
Problem: Pain:  Goal: Pain level will decrease  Description: Pain level will decrease  Outcome: Ongoing     Problem: Pain:  Goal: Control of acute pain  Description: Control of acute pain  Outcome: Ongoing     Problem: Pain:  Goal: Control of chronic pain  Description: Control of chronic pain  Outcome: Ongoing     Problem: Activity:  Goal: Ability to tolerate increased activity will improve  Description: Ability to tolerate increased activity will improve  Outcome: Met This Shift     Problem:  Activity:  Goal: Ability to implement measures to reduce episodes of fatigue will improve  Description: Ability to implement measures to reduce episodes of fatigue will improve  Outcome: Met This Shift     Problem: Coping:  Goal: Ability to adjust to condition or change in health will improve  Description: Ability to adjust to condition or change in health will improve  Outcome: Met This Shift     Problem: Fluid Volume:  Goal: Ability to maintain a balanced intake and output will improve  Description: Ability to maintain a balanced intake and output will improve  Outcome: Met This Shift

## 2021-11-01 NOTE — PLAN OF CARE
Problem: Pain:  Goal: Pain level will decrease  Description: Pain level will decrease  11/1/2021 1235 by Sonali Oropeza RN  Outcome: Completed  11/1/2021 0515 by Nela Morejon RN  Outcome: Ongoing  Goal: Control of acute pain  Description: Control of acute pain  11/1/2021 1235 by Sonali Oropeza RN  Outcome: Completed  11/1/2021 0515 by Nela Morejon RN  Outcome: Ongoing  Goal: Control of chronic pain  Description: Control of chronic pain  11/1/2021 1235 by Sonali Oropeza RN  Outcome: Completed  11/1/2021 0515 by Nela Morejon RN  Outcome: Ongoing     Problem:  Activity:  Goal: Risk for activity intolerance will decrease  Description: Risk for activity intolerance will decrease  11/1/2021 1235 by Sonali Oropeza RN  Outcome: Completed  11/1/2021 0515 by Nela Morejon RN  Outcome: Met This Shift  Goal: Ability to tolerate increased activity will improve  Description: Ability to tolerate increased activity will improve  11/1/2021 1235 by Sonali Oropeza RN  Outcome: Completed  11/1/2021 0515 by Nela Morejon RN  Outcome: Met This Shift  Goal: Ability to implement measures to reduce episodes of fatigue will improve  Description: Ability to implement measures to reduce episodes of fatigue will improve  11/1/2021 1235 by Sonali Oropeza RN  Outcome: Completed  11/1/2021 0515 by Nela Morejon RN  Outcome: Met This Shift     Problem: Coping:  Goal: Ability to adjust to condition or change in health will improve  Description: Ability to adjust to condition or change in health will improve  11/1/2021 1235 by Sonali Oropeza RN  Outcome: Completed  11/1/2021 0515 by Nela Morejon RN  Outcome: Met This Shift  Goal: Ability to cope will improve  Description: Ability to cope will improve  Outcome: Completed     Problem: Fluid Volume:  Goal: Ability to maintain a balanced intake and output will improve  Description: Ability to maintain a balanced intake and output will improve  11/1/2021 1235 by Sonali Oropeza RN  Outcome: Completed  11/1/2021 0515 by Jorge Zeng RN  Outcome: Met This Shift     Problem: Health Behavior:  Goal: Ability to identify and utilize available resources and services will improve  Description: Ability to identify and utilize available resources and services will improve  Outcome: Completed  Goal: Ability to manage health-related needs will improve  Description: Ability to manage health-related needs will improve  Outcome: Completed  Goal: Adoption of positive health habits will improve  Description: Adoption of positive health habits will improve  Outcome: Completed  Goal: Identification of resources available to assist in meeting health care needs will improve  Description: Identification of resources available to assist in meeting health care needs will improve  Outcome: Completed  Goal: Ability to verbalize follow-up procedures will improve  Description: Ability to verbalize follow-up procedures will improve  Outcome: Completed     Problem: Metabolic:  Goal: Ability to maintain appropriate glucose levels will improve  Description: Ability to maintain appropriate glucose levels will improve  Outcome: Completed     Problem: Nutritional:  Goal: Maintenance of adequate nutrition will improve  Description: Maintenance of adequate nutrition will improve  Outcome: Completed  Goal: Progress toward achieving an optimal weight will improve  Description: Progress toward achieving an optimal weight will improve  Outcome: Completed  Goal: Nutritional status will improve  Description: Nutritional status will improve  Outcome: Completed     Problem: Physical Regulation:  Goal: Complications related to the disease process, condition or treatment will be avoided or minimized  Description: Complications related to the disease process, condition or treatment will be avoided or minimized  Outcome: Completed  Goal: Diagnostic test results will improve  Description: Diagnostic test results will improve  Outcome: Completed Problem: Skin Integrity:  Goal: Risk for impaired skin integrity will decrease  Description: Risk for impaired skin integrity will decrease  Outcome: Completed     Problem: Tissue Perfusion:  Goal: Adequacy of tissue perfusion will improve  Description: Adequacy of tissue perfusion will improve  Outcome: Completed     Problem: Falls - Risk of:  Goal: Will remain free from falls  Description: Will remain free from falls  Outcome: Completed  Goal: Absence of physical injury  Description: Absence of physical injury  Outcome: Completed     Problem: Safety:  Goal: Ability to remain free from injury will improve  Description: Ability to remain free from injury will improve  Outcome: Completed     Problem: Sensory:  Goal: Ability to develop a pain control plan will improve  Description: Ability to develop a pain control plan will improve  Outcome: Completed

## 2021-11-01 NOTE — PROGRESS NOTES
[facility]  Internal Medicine Teaching Residency Program  Inpatient Daily Progress Note  ______________________________________________________________________________    Patient: Cassandra Hair  YOB: 1965   RUW:[id number]    Acct: [de-identified]     Room: [address] [place] [address]  Admit date: 10/28/2021  Today's date: 11/01/21  Number of days in the hospital: 4    SUBJECTIVE   CC: Fatigue and Extremity Weakness    No issues overnight  No complaints  Hgb stable       ROS:  General ROS: Completed and except as mentioned above were negative   HEENT ROS: Completed and except as mentioned above were negative   Allergy and Immunology ROS:  Completed and except as mentioned above were negative  Hematological and Lymphatic ROS:  Completed and except as mentioned above were negative  Respiratory ROS:  Completed and except as mentioned above were negative  Cardiovascular ROS:  Completed and except as mentioned above were negative  Gastrointestinal ROS: Completed and except as mentioned above were negative  Genito-Urinary ROS:  Completed and except as mentioned above were negative  Musculoskeletal ROS:  Completed and except as mentioned above were negative  Neurological ROS:  Completed and except as mentioned above were negative  Skin & Dermatological ROS:  Completed and except as mentioned above were negative  Psychological ROS:  Completed and except as mentioned above were negative  BRIEF HISTORY   The patient is a 57 y. o.female with PMH of recurrent ovarian cancer (initially diagnosed in 2005) receiving chemotherapy who presents to the ED today due to fatigue, weakness for the last couple of days. Patient states it is generalized all over her body, denies any nausea, vomiting. Patient states that she has episode of dizziness when getting up from the bed, but denies any history of fall or syncope.  Patient denies any tingling, numbness, any slurred speech. Patient also states that she has been constipated and her last bowel movement was 4 to 5 days ago which is new for her. She also complains of intermittent SOB since all of this started. Patient never had a colonoscopy before, no family history of colon cancer.     On arrival to the ED, patient heart rate was in the higher 90s, other vitals were stable and was saturating at 87% on room air so she was placed on 3 L NC. Pertinent labs were ordered which showed a hemoglobin of 4.3, Plt was above 700. Blood transfusion was ordered.      When I went to evaluate the patient patient was lying comfortably in the bed, with 3 L nasal cannula. Patient denied headache, vision problems, nausea, vomiting, chest pain, cough, abdominal pain, changes in bowel or urinary habits, and swelling.     Patient follows up with Dr. Emily Zelaya for her recurrent ovarian cancer, patient was on Doxil/Avastin which was started on 2020 which was discontinued due to GI bleed.  Patient was started on Gemzar on 2021. Augie Dillon did not receive it due to hospitalization.      Recent Hospitalization:  -: Was admitted for status epilepticus, stable on discharge  -3/12: Patient was admitted for anemia required 1 unit packed RBC.     PMH:  -Recurrent ovarian cancer initially diagnosed in  metastasis to the bone, receiving chemotherapy. -DVT (2021)-on Eliquis  -History of intra-abdominal bleed due to splenic mass s/p coiling and embolization 10/2020.  -Hypertension  -Seizure disorder        Social:  -Tobacco: 1 pack/day  -Alcohol: Negative  -Illicit Drug Use: Negative    OBJECTIVE     Vital Signs:  BP (!) 130/59   Pulse 74   Temp 97.7 °F (36.5 °C) (Temporal)   Resp 13   Ht 5' 5\" (1.651 m)   Wt 163 lb 5.8 oz (74.1 kg)   SpO2 100%   BMI 27.18 kg/m²     Temp (24hrs), Av.8 °F (36.6 °C), Min:97.7 °F (36.5 °C), Max:97.9 °F (36.6 °C)    No intake/output data recorded. Physical Exam:  Vitals reviewed. Constitutional:       General: She is awake.    Appearance: Normal appearance. She is normal weight. HENT:      Head: Normocephalic. Eyes:      General: Lids are normal.      Pupils: Pupils are equal, round, and reactive to light. Cardiovascular:      Rate and Rhythm: Normal rate and regular rhythm.      Pulses: Normal pulses.      Heart sounds: Normal heart sounds. Pulmonary:      Effort: Pulmonary effort is normal.      Breath sounds: Normal breath sounds and air entry. Musculoskeletal:      Right lower leg: No swelling.      Left lower leg: No swelling. Skin:     General: Skin is warm.      Capillary Refill: Capillary refill takes less than 2 seconds. Neurological:      Mental Status: She is alert and oriented to person, place, and time. Psychiatric:         Attention and Perception: Attention normal. Luvenia Both and Affect: Mood normal.         Speech: Speech normal.         Behavior: Behavior is cooperative. Medications:  Scheduled Medications:     Continuous Infusions:     PRN Medications    Diagnostic Labs:  CBC:   Recent Labs     10/31/21  0711 10/31/21  1403 11/01/21  0649   HGB 7.8* 8.0* 8.3*   HCT 29.4* 29.7* 30.4*     BMP:   Recent Labs     10/30/21  1124 10/31/21  0711 11/01/21  0649    140 138   K 3.5* 3.4* 4.2    105 105   CO2 23 23 24   BUN 6 6 6   CREATININE 0.52 0.37* 0.39*     BNP: No results for input(s): BNP in the last 72 hours. PT/INR: No results for input(s): PROTIME, INR in the last 72 hours. APTT: No results for input(s): APTT in the last 72 hours. CARDIAC ENZYMES: No results for input(s): CKMB, CKMBINDEX, TROPONINI in the last 72 hours. Invalid input(s): CKTOTAL;3  FASTING LIPID PANEL:  Lab Results   Component Value Date    CHOL 131 03/10/2021    HDL 33 (L) 03/10/2021    TRIG 147 03/10/2021     LIVER PROFILE:   No results for input(s): AST, ALT, ALB, BILIDIR, BILITOT, ALKPHOS in the last 72 hours.    MICROBIOLOGY:   Lab Results   Component Value Date/Time    CULTURE NO GROWTH 6 DAYS 06/21/2021 09:32 AM       Imaging:    CT ABDOMEN PELVIS W IV CONTRAST Additional Contrast? Radiologist Recommendation    Result Date: 10/29/2021  Diffuse distension of the colon. There is possible narrowing with mucosal thickening at the rectum. Underlying mucosal abnormality is not excluded. Recommend correlation with direct visualization. No evidence of small-bowel obstruction. Increased size of a left lower lobe pulmonary nodule. Of note are chronic pulmonary nodules in the lung bases. Findings are concerning for worsened metastatic disease in the chest.  Recommend correlation with dedicated CT chest when clinical condition allows. Otherwise no acute findings or significant change from prior study. Stable calcified masses in the pelvis, could be sequela of known or treated disease versus uterine fibroids. Stable osseous metastatic disease. ASSESSMENT & PLAN        IMPRESSION  This is a 57 y. o. female who presented with generalized fatigue and weakness and found to have decreased hemoglobin. Patient admitted to inpatient status for the management of acute anemia.     Acute on Chronic anemia likely secondary to chemotherapy vs cancer  -Hb stable  -Monitor H&H  -transfuse if Hb below 7  -FOBT was  positive  -IV Protonix  -no signs of overt bleeding, monitor for symptoms  -GI consulted, appreciate their recommendations - no intervention      History of seizure disorder  -continue home meds.      Essential hypertension  -Blood pressure WNL, will resume antihypertensive as needed     Constipation  -dulcolax suppository  -improved     History of DVT  -hold off on anticoags   -monitor for overt bleeding     History of ovarian cancer metastatic to the bone  -Receiving chemotherapy as outpatient  -Follows-up with Dr Adolfo Braga  -hem onc consulted     Thrombocytosis  -coag profile   -hem-onc consulted     DVT ppx: hold off on anticoagulation  GI ppx: protonix  Case Management: consulted     Disposition: DC today.  DC time > 30 min     Electronically signed by Jose F Hall MD on 11/1/2021 at 6:01 PM

## 2021-11-02 DIAGNOSIS — C79.51 CANCER, METASTATIC TO BONE (HCC): ICD-10-CM

## 2021-11-02 DIAGNOSIS — M54.59 INTRACTABLE LOW BACK PAIN: ICD-10-CM

## 2021-11-03 NOTE — DISCHARGE SUMMARY
bone, receiving chemotherapy. -DVT (1/2021)-on Eliquis  -History of intra-abdominal bleed due to splenic mass s/p coiling and embolization 10/2020.  -Hypertension  -Seizure disorder    Procedures/ Significant Interventions:    none    Consults:     Consults:     Final Specialist Recommendations/Findings:   IP CONSULT TO INTERNAL MEDICINE  IP CONSULT TO CASE MANAGEMENT  IP CONSULT TO HEM/ONC  IP CONSULT TO HOME CARE NEEDS  IP CONSULT TO GI      Any Hospital Acquired Infections: none    Discharge Functional Status:  stable    DISCHARGE PLAN     Disposition: home    Patient Instructions:   Discharge Medication List as of 11/1/2021 11:14 AM      START taking these medications    Details   LORazepam (ATIVAN) 0.5 MG tablet Take 1 tablet by mouth every 8 hours as needed for Anxiety for up to 10 doses. , Disp-10 tablet, R-0Print      naloxegol (MOVANTIK) 12.5 MG TABS tablet Take 1 tablet by mouth every morning, Disp-30 tablet, R-1Normal      ferrous sulfate (IRON 325) 325 (65 Fe) MG tablet Take 1 tablet by mouth daily (with breakfast), Disp-30 tablet, R-2Normal         CONTINUE these medications which have CHANGED    Details   pantoprazole (PROTONIX) 40 MG tablet Take 1 tablet by mouth daily, Disp-60 tablet, R-0Normal         CONTINUE these medications which have NOT CHANGED    Details   morphine (MSIR) 30 MG tablet Take 30 mg by mouth 2 times daily as needed for Pain. Historical Med      oxyCODONE-acetaminophen (PERCOCET) 5-325 MG per tablet TAKE 1 TABLET BY MOUTH EVERY 4 HOURS AS NEEDED FOR PAIN (BREAKTHROUGH PAIN) - REDUCE DOSES TAKEN AS PAIN BECOMES MANAGEABLE, Disp-180 tablet, R-0Normal      lamoTRIgine (LAMICTAL) 25 MG tablet TAKE 3 TABS IN IN THE MORNING AND 3 TABS IN IN THE EVENING, Disp-180 tablet, R-2Normal      sertraline (ZOLOFT) 100 MG tablet TAKE 1 TABLET BY MOUTH DAILY, Disp-30 tablet, R-3Normal      levETIRAcetam (KEPPRA) 750 MG tablet Take 2 tablets by mouth 2 times daily, Disp-240 tablet, R-2Normal ondansetron (ZOFRAN-ODT) 4 MG disintegrating tablet Take 1 tablet by mouth every 8 hours as needed for Nausea or Vomiting, Disp-21 tablet, R-0Normal      ELIQUIS 5 MG TABS tablet Take 5 mg by mouth 2 times daily , DAWHistorical Med         STOP taking these medications       dicyclomine (BENTYL) 20 MG tablet Comments:   Reason for Stopping:         ferrous sulfate (FE TABS 325) 325 (65 Fe) MG EC tablet Comments:   Reason for Stopping:         aspirin 81 MG chewable tablet Comments:   Reason for Stopping:               Activity: activity as tolerated    Diet: regular diet    Follow-up:    Viri Diego MD  [address]  [phone]    In 2 weeks  post hospital follow up for Ovarian cancer and anemia       Patient Instructions:   Please follow-up with your oncology within 2 weeks of discharge for the ovarian cancer and anemia.     Please continue to take iron pills and Protonix daily. Continue rest of your medication as advised.     Please continue to take your blood thinner which is Eliquis.     If you notice any blood in the stool then stop Eliquis and contact your family physician or go to the nearby emergency department in case of deonte blood in the stool along with dizziness/lightheadedness. Note that over 30 minutes was spent in preparing discharge papers, discussing discharge with patient, medication review, etc.      Edwardo Monae MD  Internal Medicine Resident, PGY-1  [facility];  [place], [place]  11/3/2021, 9:23 AM

## 2021-11-04 RX ORDER — MORPHINE SULFATE 30 MG/1
TABLET, FILM COATED, EXTENDED RELEASE ORAL
Qty: 60 TABLET | Refills: 0 | Status: SHIPPED | OUTPATIENT
Start: 2021-11-04 | End: 2021-12-02

## 2021-11-04 RX ORDER — MORPHINE SULFATE 15 MG/1
TABLET, FILM COATED, EXTENDED RELEASE ORAL
Qty: 60 TABLET | Refills: 0 | Status: SHIPPED | OUTPATIENT
Start: 2021-11-04 | End: 2021-12-02

## 2021-11-05 ENCOUNTER — HOSPITAL ENCOUNTER (OUTPATIENT)
Dept: INFUSION THERAPY | Age: 56
Discharge: HOME | End: 2021-11-05
Payer: COMMERCIAL

## 2021-11-05 ENCOUNTER — OFFICE VISIT (OUTPATIENT)
Dept: ONCOLOGY | Age: 56
End: 2021-11-05
Payer: COMMERCIAL

## 2021-11-05 ENCOUNTER — TELEPHONE (OUTPATIENT)
Dept: ONCOLOGY | Age: 56
End: 2021-11-05

## 2021-11-05 VITALS
TEMPERATURE: 98.1 F | RESPIRATION RATE: 16 BRPM | SYSTOLIC BLOOD PRESSURE: 107 MMHG | HEART RATE: 98 BPM | DIASTOLIC BLOOD PRESSURE: 69 MMHG

## 2021-11-05 VITALS — BODY MASS INDEX: 26.29 KG/M2 | WEIGHT: 158 LBS

## 2021-11-05 DIAGNOSIS — C56.9 MALIGNANT NEOPLASM OF OVARY, UNSPECIFIED LATERALITY (HCC): Primary | ICD-10-CM

## 2021-11-05 DIAGNOSIS — C79.60 MALIGNANT NEOPLASM METASTATIC TO OVARY, UNSPECIFIED LATERALITY (HCC): ICD-10-CM

## 2021-11-05 DIAGNOSIS — K90.9 IRON MALABSORPTION: ICD-10-CM

## 2021-11-05 DIAGNOSIS — G47.00 INSOMNIA, UNSPECIFIED TYPE: ICD-10-CM

## 2021-11-05 DIAGNOSIS — Z85.43 HISTORY OF OVARIAN CANCER: ICD-10-CM

## 2021-11-05 DIAGNOSIS — C79.51 CANCER, METASTATIC TO BONE (HCC): ICD-10-CM

## 2021-11-05 LAB
ABSOLUTE EOS #: 0.52 K/UL (ref 0–0.4)
ABSOLUTE IMMATURE GRANULOCYTE: ABNORMAL K/UL (ref 0–0.3)
ABSOLUTE LYMPH #: 2.49 K/UL (ref 1–4.8)
ABSOLUTE MONO #: 0.66 K/UL (ref 0.1–1.2)
ALBUMIN SERPL-MCNC: 4.2 G/DL (ref 3.5–5.2)
ALBUMIN/GLOBULIN RATIO: 1.8 (ref 1–2.5)
ALP BLD-CCNC: 66 U/L (ref 35–104)
ALT SERPL-CCNC: 7 U/L (ref 5–33)
ANION GAP SERPL CALCULATED.3IONS-SCNC: 13 MMOL/L (ref 9–17)
AST SERPL-CCNC: 13 U/L
BASOPHILS # BLD: 1 % (ref 0–2)
BASOPHILS ABSOLUTE: 0.13 K/UL (ref 0–0.2)
BILIRUB SERPL-MCNC: 0.19 MG/DL (ref 0.3–1.2)
BUN BLDV-MCNC: 12 MG/DL (ref 6–20)
BUN/CREAT BLD: ABNORMAL (ref 9–20)
CA 125: 316 U/ML
CALCIUM SERPL-MCNC: 8.7 MG/DL (ref 8.6–10.4)
CHLORIDE BLD-SCNC: 102 MMOL/L (ref 98–107)
CO2: 23 MMOL/L (ref 20–31)
CREAT SERPL-MCNC: 0.41 MG/DL (ref 0.5–0.9)
DIFFERENTIAL TYPE: ABNORMAL
EOSINOPHILS RELATIVE PERCENT: 4 % (ref 1–4)
GFR AFRICAN AMERICAN: >60 ML/MIN
GFR NON-AFRICAN AMERICAN: >60 ML/MIN
GFR SERPL CREATININE-BSD FRML MDRD: ABNORMAL ML/MIN/{1.73_M2}
GFR SERPL CREATININE-BSD FRML MDRD: ABNORMAL ML/MIN/{1.73_M2}
GLUCOSE BLD-MCNC: 149 MG/DL (ref 70–99)
HCT VFR BLD CALC: 34 % (ref 36–46)
HEMOGLOBIN: 10.2 G/DL (ref 12–16)
IMMATURE GRANULOCYTES: ABNORMAL %
LYMPHOCYTES # BLD: 19 % (ref 24–44)
MCH RBC QN AUTO: 24.7 PG (ref 26–34)
MCHC RBC AUTO-ENTMCNC: 30.1 G/DL (ref 31–37)
MCV RBC AUTO: 82.2 FL (ref 80–100)
MONOCYTES # BLD: 5 % (ref 2–11)
MORPHOLOGY: ABNORMAL
NRBC AUTOMATED: ABNORMAL PER 100 WBC
PDW BLD-RTO: 35.2 % (ref 12.5–15.4)
PLATELET # BLD: 781 K/UL (ref 140–450)
PLATELET ESTIMATE: ABNORMAL
PMV BLD AUTO: 6.7 FL (ref 6–12)
POTASSIUM SERPL-SCNC: 4.2 MMOL/L (ref 3.7–5.3)
RBC # BLD: 4.14 M/UL (ref 4–5.2)
RBC # BLD: ABNORMAL 10*6/UL
SEG NEUTROPHILS: 71 % (ref 36–66)
SEGMENTED NEUTROPHILS ABSOLUTE COUNT: 9.3 K/UL (ref 1.8–7.7)
SODIUM BLD-SCNC: 138 MMOL/L (ref 135–144)
TOTAL PROTEIN: 6.5 G/DL (ref 6.4–8.3)
WBC # BLD: 13.1 K/UL (ref 3.5–11)
WBC # BLD: ABNORMAL 10*3/UL

## 2021-11-05 PROCEDURE — 1111F DSCHRG MED/CURRENT MED MERGE: CPT | Performed by: INTERNAL MEDICINE

## 2021-11-05 PROCEDURE — 96375 TX/PRO/DX INJ NEW DRUG ADDON: CPT

## 2021-11-05 PROCEDURE — 99211 OFF/OP EST MAY X REQ PHY/QHP: CPT | Performed by: INTERNAL MEDICINE

## 2021-11-05 PROCEDURE — 36591 DRAW BLOOD OFF VENOUS DEVICE: CPT

## 2021-11-05 PROCEDURE — 80053 COMPREHEN METABOLIC PANEL: CPT

## 2021-11-05 PROCEDURE — 86304 IMMUNOASSAY TUMOR CA 125: CPT

## 2021-11-05 PROCEDURE — G8484 FLU IMMUNIZE NO ADMIN: HCPCS | Performed by: INTERNAL MEDICINE

## 2021-11-05 PROCEDURE — G8427 DOCREV CUR MEDS BY ELIG CLIN: HCPCS | Performed by: INTERNAL MEDICINE

## 2021-11-05 PROCEDURE — 4004F PT TOBACCO SCREEN RCVD TLK: CPT | Performed by: INTERNAL MEDICINE

## 2021-11-05 PROCEDURE — 96413 CHEMO IV INFUSION 1 HR: CPT

## 2021-11-05 PROCEDURE — 2580000003 HC RX 258: Performed by: INTERNAL MEDICINE

## 2021-11-05 PROCEDURE — 6360000002 HC RX W HCPCS: Performed by: INTERNAL MEDICINE

## 2021-11-05 PROCEDURE — 99214 OFFICE O/P EST MOD 30 MIN: CPT | Performed by: INTERNAL MEDICINE

## 2021-11-05 PROCEDURE — 3017F COLORECTAL CA SCREEN DOC REV: CPT | Performed by: INTERNAL MEDICINE

## 2021-11-05 PROCEDURE — 85025 COMPLETE CBC W/AUTO DIFF WBC: CPT

## 2021-11-05 PROCEDURE — G8417 CALC BMI ABV UP PARAM F/U: HCPCS | Performed by: INTERNAL MEDICINE

## 2021-11-05 RX ORDER — SODIUM CHLORIDE 9 MG/ML
20 INJECTION, SOLUTION INTRAVENOUS ONCE
Status: COMPLETED | OUTPATIENT
Start: 2021-11-05 | End: 2021-11-05

## 2021-11-05 RX ORDER — SODIUM CHLORIDE 0.9 % (FLUSH) 0.9 %
10 SYRINGE (ML) INJECTION PRN
Status: DISCONTINUED | OUTPATIENT
Start: 2021-11-05 | End: 2021-11-06 | Stop reason: HOSPADM

## 2021-11-05 RX ORDER — LORAZEPAM 1 MG/1
1 TABLET ORAL EVERY 8 HOURS PRN
Qty: 90 TABLET | Refills: 0 | Status: SHIPPED | OUTPATIENT
Start: 2021-11-05 | End: 2022-01-07 | Stop reason: SDUPTHER

## 2021-11-05 RX ORDER — HEPARIN SODIUM (PORCINE) LOCK FLUSH IV SOLN 100 UNIT/ML 100 UNIT/ML
500 SOLUTION INTRAVENOUS PRN
Status: DISCONTINUED | OUTPATIENT
Start: 2021-11-05 | End: 2021-11-06 | Stop reason: HOSPADM

## 2021-11-05 RX ORDER — DEXAMETHASONE SODIUM PHOSPHATE 4 MG/ML
8 INJECTION, SOLUTION INTRA-ARTICULAR; INTRALESIONAL; INTRAMUSCULAR; INTRAVENOUS; SOFT TISSUE ONCE
Status: COMPLETED | OUTPATIENT
Start: 2021-11-05 | End: 2021-11-05

## 2021-11-05 NOTE — TELEPHONE ENCOUNTER
Name: Hayley [facility]  : 1965  MRN: [id number]    Oncology Navigation Follow-Up Note    Contact Type:  Medical Oncology  Notes: Pt @ [facility] for Dr. Jas Grewal f/u & tx. Met with pt after f/u completed. Pt denied questions/concerns. Instructed pt may contact writer prn. Will continue to follow.     Electronically signed by Madelyn Jules RN on 2021 at 9:41 AM

## 2021-11-05 NOTE — PROGRESS NOTES
Patient arrived for [facility] collected and reviewed   Seen by Dr Kateryna Baird prior   Tolerated w/o incident   Return 11/12  Herminio Cedillo RN

## 2021-11-05 NOTE — TELEPHONE ENCOUNTER
AVS from 11/5/21     at the start of every cycle  Proceed with chemo  RV 2-4 weeks    ca125 added to D1 of every cycle    Tx today as scheduled    RV scheduled 12/3/21 @ 9am    PT was given AVS and an appt schedule    Electronically signed by Carol Ayala on 11/5/2021 at 3:39 PM

## 2021-11-07 NOTE — PROGRESS NOTES
_      Chief Complaint   Patient presents with    Follow-up     hospitalized, hgb was 4.3     Pain     back and stomach     Fatigue     does not sleep at night     Constipation     DIAGNOSIS:       Recurrent ovarian cancer. Original diagnosis 2005 with multiple recurrences. Diffuse metastasis. CURRENT THERAPY:         Doxil/ Avastin started 9/18/2020. Avastin was discontinued due to recent GI bleeding. Status post recent vascular embolization  S/p seizure disorder. Gemzar started 2/26/2021. Interrupted due to hospitalization and problem with compliance. BRIEF CASE HISTORY:      Ms. Miles Gray is a very pleasant 60 y.o. female with history of multiple co morbidities as listed. The patient seen in consultation for ovarian cancer with multiple recurrences. 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 She had surgical debulking and she had systemic treatment with Taxol and carboplatin for 6 cycles. Patient was in remission for about 2 years. She had a relapse in 2007 and treated with topotecan with good results. Patient relapsed again in 2008 and was treated with Taxol carboplatin. After 3 cycles of systemic chemotherapy she had problems with carboplatin so she finished 3 more cycles with Taxol. She did well again for 2 to 3 years until she had a relapse in 2012 and she had intraperitoneal chemotherapy treatment with Taxol. Patient was last seen by her oncologist in 2014 at which time she had relatively stable disease with normal tumor marker and no significant abnormal images. Patient moved to [place] after that and she was lost for oncology follow-ups. Patient was recently evaluated again here in [facility] because of abdominal discomfort. Re imaging confirmed metastatic relapse. Biopsy confirmed ovarian cancer recurrence.  Scan showed extensive ferrous sulfate. ALLERGIES:  is allergic to ceftriaxone. FAMILY HISTORY: Negative for any hematological or oncological conditions. SOCIAL HISTORY:  reports that she has been smoking cigarettes. She has been smoking about 1.00 pack per day. She uses smokeless tobacco. She reports previous alcohol use. She reports that she does not use drugs. REVIEW OF SYSTEMS:     · General: No weakness or fatigue. No unanticipated weight loss or decreased appetite. No fever or chills. · Eyes: No blurred vision, eye pain or double vision. · Ears: No hearing problems or drainage. No tinnitus. · Throat: No sore throat, problems with swallowing or dysphagia. · Respiratory: No cough, sputum or hemoptysis. No shortness of breath. No pleuritic chest pain. · Cardiovascular: No chest pain, orthopnea or PND. No lower extremity edema. No palpitation. · Gastrointestinal: As above. · Genitourinary: No dysuria, hematuria, frequency or urgency. · Musculoskeletal: No muscle aches or pains. No limitation of movement. No back pain. No gait disturbance, No joint complaints. · Dermatologic: No skin rashes or pruritus. No skin lesions or discolorations. · Psychiatric: No depression, anxiety, or stress or signs of schizophrenia. No change in mood or affect. · Hematologic: No history of bleeding tendency. No bruises or ecchymosis. No history of clotting problems. · Infectious disease: No fever, chills or frequent infections. · Endocrine: No polydipsia or polyuria. No temperature intolerance. · Neurologic: No headaches or dizziness. No weakness or numbness of the extremities. No changes in balance, coordination,  memory, mentation, behavior. · Allergic/Immunologic: No nasal congestion or hives. No repeated infections. PHYSICAL EXAM:  The patient is not in acute distress. Vital signs: Blood pressure 107/69, pulse 98, temperature 98.1 °F (36.7 °C), temperature source Oral, resp. rate 16.      General appearance - well appearing, not in pain or distress  Mental status - good mood, alert and oriented  Eyes - pupils equal and reactive, extraocular eye movements intact  Ears - bilateral TM's and external ear canals normal  Nose - normal and patent, no erythema, discharge or polyps  Mouth - mucous membranes moist, pharynx normal without lesions  Neck - supple, no significant adenopathy  Lymphatics - no palpable lymphadenopathy, no hepatosplenomegaly  Chest - clear to auscultation, no wheezes, rales or rhonchi, symmetric air entry  Heart - normal rate, regular rhythm, normal S1, S2, no murmurs, rubs, clicks or gallops  Abdomen - soft, nontender, nondistended, no masses or organomegaly  Neurological - alert, oriented, normal speech, no focal findings or movement disorder noted  Musculoskeletal - no joint tenderness, deformity or swelling  Extremities - peripheral pulses normal, no pedal edema, no clubbing or cyanosis  Skin - normal coloration and turgor, no rashes, no suspicious skin lesions noted     Review of Diagnostic data:   Lab Results   Component Value Date    WBC 13.1 (H) 11/05/2021    HGB 10.2 (L) 11/05/2021    HCT 34.0 (L) 11/05/2021    MCV 82.2 11/05/2021     (H) 11/05/2021       Chemistry        Component Value Date/Time     11/05/2021 0848    K 4.2 11/05/2021 0848     11/05/2021 0848    CO2 23 11/05/2021 0848    BUN 12 11/05/2021 0848    CREATININE 0.41 (L) 11/05/2021 0848        Component Value Date/Time    CALCIUM 8.7 11/05/2021 0848    ALKPHOS 66 11/05/2021 0848    AST 13 11/05/2021 0848    ALT 7 11/05/2021 0848    BILITOT 0.19 (L) 11/05/2021 0848          Lab Results   Component Value Date     316 (H) 11/05/2021         IMPRESSION:   Recurrent ovarian cancer. Original diagnosis 2005 with multiple recurrences. Diffuse metastasis. Recent active intra-abdominal bleeding due to splenic mass with GI infiltration. Status post embolization  S/p seizure disorder.      PLAN: Discussed palliative treatment. Different regimens were discussed. We prescribed Doxil as single agent but insurance declined. Patient was started on Doxil and Avastin. She tolerated treatment fairly well. However due to recent episode of intra-abdominal bleeding we will discontinue Avastin. Next treatment will be with the use of Doxil. Patient had embolization by vascular. s/p hospitalization for seizure. No brain mets. Seizures controlled. Hospitalized last week with left leg DVT and anemia. Hemoglobin is now stable. She received blood transfusion. No active bleeding. She is currently on Eliquis for DVT. Tolerated well with no side effects. S/p hospitalization for severe anemia. S/p blood transfusion. We will monitor labs and give blood transfusion for symptomatic anemia. Will proceed with chemo. She agreed. Monitor toxicity and response to treatment. We will repeat tumor marker CA-125   Patient's questions were answered to the best of her satisfaction and she verbalized full understanding and agreement.

## 2021-11-09 ENCOUNTER — TELEPHONE (OUTPATIENT)
Dept: INFUSION THERAPY | Age: 56
End: 2021-11-09

## 2021-11-09 NOTE — TELEPHONE ENCOUNTER
"Chief Complaint   Patient presents with     A.D.H.D     Referral     back specialist      Health Maintenance     PHQ     Vaginal Problem       Initial /62 (BP Location: Right arm, Patient Position: Chair, Cuff Size: Adult Regular)  Pulse 88  Temp 97.1  F (36.2  C) (Oral)  Ht 5' 8\" (1.727 m)  Wt 164 lb (74.4 kg)  LMP 01/03/2014  SpO2 98%  BMI 24.94 kg/m2 Estimated body mass index is 24.94 kg/(m^2) as calculated from the following:    Height as of this encounter: 5' 8\" (1.727 m).    Weight as of this encounter: 164 lb (74.4 kg).  Medication Reconciliation: complete    " Patient called requesting a PA for Movantik, we did not order the Movantik. Therefore we did not receive a PA. I will talk to Dr. Colt Mitchell about ordering this medication or a similar medication. I updated the patient. She stated she was told that this was already taken care of. I explained that I did not see any notes regarding the Movantik or a PA for it. That I would route this message to Dr. Colt Mitchell. She verbalized understanding.

## 2021-11-12 ENCOUNTER — HOSPITAL ENCOUNTER (OUTPATIENT)
Dept: INFUSION THERAPY | Age: 56
Discharge: HOME | End: 2021-11-12
Payer: COMMERCIAL

## 2021-11-12 VITALS
BODY MASS INDEX: 26.79 KG/M2 | RESPIRATION RATE: 18 BRPM | DIASTOLIC BLOOD PRESSURE: 62 MMHG | HEART RATE: 93 BPM | SYSTOLIC BLOOD PRESSURE: 136 MMHG | WEIGHT: 161 LBS | TEMPERATURE: 98.5 F

## 2021-11-12 DIAGNOSIS — C56.9 MALIGNANT NEOPLASM OF OVARY, UNSPECIFIED LATERALITY (HCC): Primary | ICD-10-CM

## 2021-11-12 DIAGNOSIS — C79.51 CANCER, METASTATIC TO BONE (HCC): ICD-10-CM

## 2021-11-12 DIAGNOSIS — Z85.43 HISTORY OF OVARIAN CANCER: ICD-10-CM

## 2021-11-12 LAB
ABSOLUTE EOS #: 0.22 K/UL (ref 0–0.4)
ABSOLUTE IMMATURE GRANULOCYTE: ABNORMAL K/UL (ref 0–0.3)
ABSOLUTE LYMPH #: 1.44 K/UL (ref 1–4.8)
ABSOLUTE MONO #: 0.26 K/UL (ref 0.1–0.8)
ALBUMIN SERPL-MCNC: 4.1 G/DL (ref 3.5–5.2)
ALBUMIN/GLOBULIN RATIO: 1.8 (ref 1–2.5)
ALP BLD-CCNC: 66 U/L (ref 35–104)
ALT SERPL-CCNC: 11 U/L (ref 5–33)
ANION GAP SERPL CALCULATED.3IONS-SCNC: 11 MMOL/L (ref 9–17)
AST SERPL-CCNC: 14 U/L
BASOPHILS # BLD: 1 % (ref 0–2)
BASOPHILS ABSOLUTE: 0.04 K/UL (ref 0–0.2)
BILIRUB SERPL-MCNC: 0.1 MG/DL (ref 0.3–1.2)
BUN BLDV-MCNC: 9 MG/DL (ref 6–20)
BUN/CREAT BLD: ABNORMAL (ref 9–20)
CALCIUM SERPL-MCNC: 9.1 MG/DL (ref 8.6–10.4)
CHLORIDE BLD-SCNC: 103 MMOL/L (ref 98–107)
CO2: 25 MMOL/L (ref 20–31)
CREAT SERPL-MCNC: <0.4 MG/DL (ref 0.5–0.9)
DIFFERENTIAL TYPE: ABNORMAL
EOSINOPHILS RELATIVE PERCENT: 6 % (ref 1–4)
GFR AFRICAN AMERICAN: ABNORMAL ML/MIN
GFR NON-AFRICAN AMERICAN: ABNORMAL ML/MIN
GFR SERPL CREATININE-BSD FRML MDRD: ABNORMAL ML/MIN/{1.73_M2}
GFR SERPL CREATININE-BSD FRML MDRD: ABNORMAL ML/MIN/{1.73_M2}
GLUCOSE BLD-MCNC: 113 MG/DL (ref 70–99)
HCT VFR BLD CALC: 33.4 % (ref 36–46)
HEMOGLOBIN: 10.3 G/DL (ref 12–16)
IMMATURE GRANULOCYTES: ABNORMAL %
LYMPHOCYTES # BLD: 39 % (ref 24–44)
MCH RBC QN AUTO: 25.9 PG (ref 26–34)
MCHC RBC AUTO-ENTMCNC: 30.9 G/DL (ref 31–37)
MCV RBC AUTO: 83.9 FL (ref 80–100)
MONOCYTES # BLD: 7 % (ref 1–7)
MORPHOLOGY: ABNORMAL
NRBC AUTOMATED: ABNORMAL PER 100 WBC
PDW BLD-RTO: 33.6 % (ref 12.5–15.4)
PLATELET # BLD: 494 K/UL (ref 140–450)
PLATELET ESTIMATE: ABNORMAL
PMV BLD AUTO: 6.3 FL (ref 6–12)
POTASSIUM SERPL-SCNC: 3.9 MMOL/L (ref 3.7–5.3)
RBC # BLD: 3.99 M/UL (ref 4–5.2)
RBC # BLD: ABNORMAL 10*6/UL
SEG NEUTROPHILS: 47 % (ref 36–66)
SEGMENTED NEUTROPHILS ABSOLUTE COUNT: 1.74 K/UL (ref 1.8–7.7)
SODIUM BLD-SCNC: 139 MMOL/L (ref 135–144)
TOTAL PROTEIN: 6.4 G/DL (ref 6.4–8.3)
WBC # BLD: 3.7 K/UL (ref 3.5–11)
WBC # BLD: ABNORMAL 10*3/UL

## 2021-11-12 PROCEDURE — 96413 CHEMO IV INFUSION 1 HR: CPT

## 2021-11-12 PROCEDURE — 2580000003 HC RX 258: Performed by: INTERNAL MEDICINE

## 2021-11-12 PROCEDURE — 6360000002 HC RX W HCPCS: Performed by: INTERNAL MEDICINE

## 2021-11-12 PROCEDURE — 85025 COMPLETE CBC W/AUTO DIFF WBC: CPT

## 2021-11-12 PROCEDURE — 96375 TX/PRO/DX INJ NEW DRUG ADDON: CPT

## 2021-11-12 PROCEDURE — 36591 DRAW BLOOD OFF VENOUS DEVICE: CPT

## 2021-11-12 PROCEDURE — 80053 COMPREHEN METABOLIC PANEL: CPT

## 2021-11-12 RX ORDER — SODIUM CHLORIDE 0.9 % (FLUSH) 0.9 %
10 SYRINGE (ML) INJECTION PRN
Status: DISCONTINUED | OUTPATIENT
Start: 2021-11-12 | End: 2021-11-13 | Stop reason: HOSPADM

## 2021-11-12 RX ORDER — SODIUM CHLORIDE 9 MG/ML
20 INJECTION, SOLUTION INTRAVENOUS ONCE
Status: COMPLETED | OUTPATIENT
Start: 2021-11-12 | End: 2021-11-12

## 2021-11-12 RX ORDER — DEXAMETHASONE SODIUM PHOSPHATE 4 MG/ML
8 INJECTION, SOLUTION INTRA-ARTICULAR; INTRALESIONAL; INTRAMUSCULAR; INTRAVENOUS; SOFT TISSUE ONCE
Status: COMPLETED | OUTPATIENT
Start: 2021-11-12 | End: 2021-11-12

## 2021-11-12 RX ORDER — HEPARIN SODIUM (PORCINE) LOCK FLUSH IV SOLN 100 UNIT/ML 100 UNIT/ML
500 SOLUTION INTRAVENOUS PRN
Status: DISCONTINUED | OUTPATIENT
Start: 2021-11-12 | End: 2021-11-13 | Stop reason: HOSPADM

## 2021-11-12 RX ADMIN — SODIUM CHLORIDE, PRESERVATIVE FREE 10 ML: 5 INJECTION INTRAVENOUS at 11:36

## 2021-11-12 RX ADMIN — GEMCITABINE 1800 MG: 38 INJECTION, SOLUTION INTRAVENOUS at 11:01

## 2021-11-12 RX ADMIN — HEPARIN 500 UNITS: 100 SYRINGE at 11:37

## 2021-11-12 RX ADMIN — DEXAMETHASONE SODIUM PHOSPHATE 8 MG: 4 INJECTION, SOLUTION INTRAMUSCULAR; INTRAVENOUS at 10:24

## 2021-11-12 RX ADMIN — SODIUM CHLORIDE 20 ML/HR: 9 INJECTION, SOLUTION INTRAVENOUS at 10:22

## 2021-11-12 RX ADMIN — SODIUM CHLORIDE, PRESERVATIVE FREE 10 ML: 5 INJECTION INTRAVENOUS at 09:31

## 2021-11-12 RX ADMIN — SODIUM CHLORIDE, PRESERVATIVE FREE 10 ML: 5 INJECTION INTRAVENOUS at 09:30

## 2021-11-12 NOTE — PROGRESS NOTES
Patient arrived for [place] [id number] collected and reviewed   Tolerated w/o incident   Return 12/3  Macho Arguelles RN     Pt request for Dr Karol Johnson to fill yoginik RX left on clipboard on his desk

## 2021-11-15 DIAGNOSIS — C56.9 MALIGNANT NEOPLASM OF OVARY, UNSPECIFIED LATERALITY (HCC): Primary | ICD-10-CM

## 2021-11-15 DIAGNOSIS — C56.9 MALIGNANT NEOPLASM OF OVARY, UNSPECIFIED LATERALITY (HCC): ICD-10-CM

## 2021-11-15 NOTE — TELEPHONE ENCOUNTER
Clarified with Dr Rancho Simental if he is ok with ordering the Movantik so our office can do the PA. He is ok with ordering. [address] updated and we will do PA and update them once we know of results.

## 2021-11-16 ENCOUNTER — TELEPHONE (OUTPATIENT)
Dept: INFUSION THERAPY | Age: 56
End: 2021-11-16

## 2021-11-16 NOTE — TELEPHONE ENCOUNTER
PA requested rec'd via fax for [address] [place] [address]. Spoke with [employer] rep and was notified PA must be completed and faxed, unable to obtain PA over phone. PA form printed from [employer] and faxed with confirmation. Form left in pending bin for triage RN to f/u.

## 2021-11-19 NOTE — TELEPHONE ENCOUNTER
PA denied d/t medications not being prescribed. Writer spoke with Alejo Lucas at [employer] and she states the medications that were tried and failed need to either be prescribed to the pt and not purchased OTC, or have documentation with the exact medications tried faxed to them. She states MD can also writer letter of medical necessity for Movantik and send with originally submitted PA. Letter created and faxed with PA form to [employer] with confirmation.

## 2021-11-26 DIAGNOSIS — M54.59 INTRACTABLE LOW BACK PAIN: ICD-10-CM

## 2021-11-26 DIAGNOSIS — C79.51 CANCER, METASTATIC TO BONE (HCC): ICD-10-CM

## 2021-11-30 RX ORDER — OXYCODONE HYDROCHLORIDE AND ACETAMINOPHEN 5; 325 MG/1; MG/1
TABLET ORAL
Qty: 180 TABLET | Refills: 0 | Status: SHIPPED | OUTPATIENT
Start: 2021-11-30 | End: 2021-12-28

## 2021-12-01 ENCOUNTER — TELEPHONE (OUTPATIENT)
Dept: INFUSION THERAPY | Age: 56
End: 2021-12-01

## 2021-12-01 DIAGNOSIS — C79.51 CANCER, METASTATIC TO BONE (HCC): ICD-10-CM

## 2021-12-01 DIAGNOSIS — M54.59 INTRACTABLE LOW BACK PAIN: ICD-10-CM

## 2021-12-01 NOTE — TELEPHONE ENCOUNTER
Patient called in to see if her disability paperwork has been signed   Writer unable to find   Pt states it through [place] and her contact is Liam Klinefelter [phone], attempted to call Liam Klinefelter to have forms re faxed, EBONI Kimball RN

## 2021-12-02 RX ORDER — MORPHINE SULFATE 15 MG/1
TABLET, FILM COATED, EXTENDED RELEASE ORAL
Qty: 60 TABLET | Refills: 0 | Status: SHIPPED | OUTPATIENT
Start: 2021-12-02 | End: 2021-12-28 | Stop reason: SDUPTHER

## 2021-12-02 RX ORDER — MORPHINE SULFATE 30 MG/1
TABLET, FILM COATED, EXTENDED RELEASE ORAL
Qty: 60 TABLET | Refills: 0 | Status: SHIPPED | OUTPATIENT
Start: 2021-12-02 | End: 2021-12-28 | Stop reason: SDUPTHER

## 2021-12-03 ENCOUNTER — HOSPITAL ENCOUNTER (OUTPATIENT)
Dept: INFUSION THERAPY | Age: 56
Discharge: HOME | End: 2021-12-03
Payer: COMMERCIAL

## 2021-12-03 ENCOUNTER — TELEPHONE (OUTPATIENT)
Dept: ONCOLOGY | Age: 56
End: 2021-12-03

## 2021-12-03 ENCOUNTER — OFFICE VISIT (OUTPATIENT)
Dept: ONCOLOGY | Age: 56
End: 2021-12-03
Payer: COMMERCIAL

## 2021-12-03 VITALS
HEART RATE: 81 BPM | DIASTOLIC BLOOD PRESSURE: 77 MMHG | TEMPERATURE: 98.1 F | BODY MASS INDEX: 26.92 KG/M2 | SYSTOLIC BLOOD PRESSURE: 125 MMHG | WEIGHT: 161.8 LBS

## 2021-12-03 DIAGNOSIS — C56.9 MALIGNANT NEOPLASM OF OVARY, UNSPECIFIED LATERALITY (HCC): Primary | ICD-10-CM

## 2021-12-03 DIAGNOSIS — Z85.43 HISTORY OF OVARIAN CANCER: ICD-10-CM

## 2021-12-03 DIAGNOSIS — C79.51 CANCER, METASTATIC TO BONE (HCC): ICD-10-CM

## 2021-12-03 LAB
ABSOLUTE EOS #: 0.31 K/UL (ref 0–0.4)
ABSOLUTE IMMATURE GRANULOCYTE: ABNORMAL K/UL (ref 0–0.3)
ABSOLUTE LYMPH #: 1.33 K/UL (ref 1–4.8)
ABSOLUTE MONO #: 0.78 K/UL (ref 0.1–1.2)
ALBUMIN SERPL-MCNC: 3.7 G/DL (ref 3.5–5.2)
ALBUMIN/GLOBULIN RATIO: 1.6 (ref 1–2.5)
ALP BLD-CCNC: 73 U/L (ref 35–104)
ALT SERPL-CCNC: 5 U/L (ref 5–33)
ANION GAP SERPL CALCULATED.3IONS-SCNC: 9 MMOL/L (ref 9–17)
AST SERPL-CCNC: 9 U/L
BASOPHILS # BLD: 2 % (ref 0–2)
BASOPHILS ABSOLUTE: 0.16 K/UL (ref 0–0.2)
BILIRUB SERPL-MCNC: 0.13 MG/DL (ref 0.3–1.2)
BUN BLDV-MCNC: 9 MG/DL (ref 6–20)
BUN/CREAT BLD: ABNORMAL (ref 9–20)
CA 125: 297 U/ML
CALCIUM SERPL-MCNC: 8.7 MG/DL (ref 8.6–10.4)
CHLORIDE BLD-SCNC: 106 MMOL/L (ref 98–107)
CO2: 26 MMOL/L (ref 20–31)
CREAT SERPL-MCNC: 0.52 MG/DL (ref 0.5–0.9)
DIFFERENTIAL TYPE: ABNORMAL
EOSINOPHILS RELATIVE PERCENT: 4 % (ref 1–4)
GFR AFRICAN AMERICAN: >60 ML/MIN
GFR NON-AFRICAN AMERICAN: >60 ML/MIN
GFR SERPL CREATININE-BSD FRML MDRD: ABNORMAL ML/MIN/{1.73_M2}
GFR SERPL CREATININE-BSD FRML MDRD: ABNORMAL ML/MIN/{1.73_M2}
GLUCOSE BLD-MCNC: 95 MG/DL (ref 70–99)
HCT VFR BLD CALC: 36.6 % (ref 36–46)
HEMOGLOBIN: 11.4 G/DL (ref 12–16)
IMMATURE GRANULOCYTES: ABNORMAL %
LYMPHOCYTES # BLD: 17 % (ref 24–44)
MCH RBC QN AUTO: 27.9 PG (ref 26–34)
MCHC RBC AUTO-ENTMCNC: 31.2 G/DL (ref 31–37)
MCV RBC AUTO: 89.4 FL (ref 80–100)
MONOCYTES # BLD: 10 % (ref 2–11)
MORPHOLOGY: ABNORMAL
NRBC AUTOMATED: ABNORMAL PER 100 WBC
PDW BLD-RTO: 27.6 % (ref 12.5–15.4)
PLATELET # BLD: 812 K/UL (ref 140–450)
PLATELET ESTIMATE: ABNORMAL
PMV BLD AUTO: 6.7 FL (ref 6–12)
POTASSIUM SERPL-SCNC: 3.9 MMOL/L (ref 3.7–5.3)
RBC # BLD: 4.1 M/UL (ref 4–5.2)
RBC # BLD: ABNORMAL 10*6/UL
SEG NEUTROPHILS: 67 % (ref 36–66)
SEGMENTED NEUTROPHILS ABSOLUTE COUNT: 5.22 K/UL (ref 1.8–7.7)
SODIUM BLD-SCNC: 141 MMOL/L (ref 135–144)
TOTAL PROTEIN: 6 G/DL (ref 6.4–8.3)
WBC # BLD: 7.8 K/UL (ref 3.5–11)
WBC # BLD: ABNORMAL 10*3/UL

## 2021-12-03 PROCEDURE — 2580000003 HC RX 258: Performed by: INTERNAL MEDICINE

## 2021-12-03 PROCEDURE — 86304 IMMUNOASSAY TUMOR CA 125: CPT

## 2021-12-03 PROCEDURE — 99214 OFFICE O/P EST MOD 30 MIN: CPT | Performed by: INTERNAL MEDICINE

## 2021-12-03 PROCEDURE — 96413 CHEMO IV INFUSION 1 HR: CPT

## 2021-12-03 PROCEDURE — 6360000002 HC RX W HCPCS: Performed by: INTERNAL MEDICINE

## 2021-12-03 PROCEDURE — 4004F PT TOBACCO SCREEN RCVD TLK: CPT | Performed by: INTERNAL MEDICINE

## 2021-12-03 PROCEDURE — 96375 TX/PRO/DX INJ NEW DRUG ADDON: CPT

## 2021-12-03 PROCEDURE — G8484 FLU IMMUNIZE NO ADMIN: HCPCS | Performed by: INTERNAL MEDICINE

## 2021-12-03 PROCEDURE — 99211 OFF/OP EST MAY X REQ PHY/QHP: CPT | Performed by: INTERNAL MEDICINE

## 2021-12-03 PROCEDURE — 85025 COMPLETE CBC W/AUTO DIFF WBC: CPT

## 2021-12-03 PROCEDURE — 36591 DRAW BLOOD OFF VENOUS DEVICE: CPT

## 2021-12-03 PROCEDURE — G8417 CALC BMI ABV UP PARAM F/U: HCPCS | Performed by: INTERNAL MEDICINE

## 2021-12-03 PROCEDURE — 3017F COLORECTAL CA SCREEN DOC REV: CPT | Performed by: INTERNAL MEDICINE

## 2021-12-03 PROCEDURE — 80053 COMPREHEN METABOLIC PANEL: CPT

## 2021-12-03 PROCEDURE — G8427 DOCREV CUR MEDS BY ELIG CLIN: HCPCS | Performed by: INTERNAL MEDICINE

## 2021-12-03 RX ORDER — SODIUM CHLORIDE 0.9 % (FLUSH) 0.9 %
5 SYRINGE (ML) INJECTION PRN
Status: CANCELLED | OUTPATIENT
Start: 2021-12-10

## 2021-12-03 RX ORDER — SODIUM CHLORIDE 0.9 % (FLUSH) 0.9 %
10 SYRINGE (ML) INJECTION PRN
Status: CANCELLED | OUTPATIENT
Start: 2021-12-10

## 2021-12-03 RX ORDER — METHYLPREDNISOLONE SODIUM SUCCINATE 125 MG/2ML
125 INJECTION, POWDER, LYOPHILIZED, FOR SOLUTION INTRAMUSCULAR; INTRAVENOUS ONCE
Status: CANCELLED | OUTPATIENT
Start: 2021-12-27 | End: 2021-12-24

## 2021-12-03 RX ORDER — SODIUM CHLORIDE 0.9 % (FLUSH) 0.9 %
5 SYRINGE (ML) INJECTION PRN
Status: CANCELLED | OUTPATIENT
Start: 2021-12-03

## 2021-12-03 RX ORDER — SODIUM CHLORIDE 0.9 % (FLUSH) 0.9 %
5 SYRINGE (ML) INJECTION PRN
Status: CANCELLED | OUTPATIENT
Start: 2022-01-21

## 2021-12-03 RX ORDER — HEPARIN SODIUM (PORCINE) LOCK FLUSH IV SOLN 100 UNIT/ML 100 UNIT/ML
500 SOLUTION INTRAVENOUS PRN
Status: CANCELLED | OUTPATIENT
Start: 2022-01-21

## 2021-12-03 RX ORDER — SODIUM CHLORIDE 0.9 % (FLUSH) 0.9 %
5 SYRINGE (ML) INJECTION PRN
Status: CANCELLED | OUTPATIENT
Start: 2021-12-27

## 2021-12-03 RX ORDER — DEXAMETHASONE SODIUM PHOSPHATE 10 MG/ML
8 INJECTION INTRAMUSCULAR; INTRAVENOUS ONCE
Status: COMPLETED | OUTPATIENT
Start: 2021-12-03 | End: 2021-12-03

## 2021-12-03 RX ORDER — SODIUM CHLORIDE 9 MG/ML
INJECTION, SOLUTION INTRAVENOUS CONTINUOUS
Status: CANCELLED | OUTPATIENT
Start: 2022-01-21

## 2021-12-03 RX ORDER — SODIUM CHLORIDE 0.9 % (FLUSH) 0.9 %
5 SYRINGE (ML) INJECTION PRN
Status: CANCELLED | OUTPATIENT
Start: 2022-01-07

## 2021-12-03 RX ORDER — HEPARIN SODIUM (PORCINE) LOCK FLUSH IV SOLN 100 UNIT/ML 100 UNIT/ML
500 SOLUTION INTRAVENOUS PRN
Status: DISCONTINUED | OUTPATIENT
Start: 2021-12-03 | End: 2021-12-04 | Stop reason: HOSPADM

## 2021-12-03 RX ORDER — DIPHENHYDRAMINE HYDROCHLORIDE 50 MG/ML
50 INJECTION INTRAMUSCULAR; INTRAVENOUS ONCE
Status: CANCELLED | OUTPATIENT
Start: 2021-12-27 | End: 2021-12-24

## 2021-12-03 RX ORDER — HEPARIN SODIUM (PORCINE) LOCK FLUSH IV SOLN 100 UNIT/ML 100 UNIT/ML
500 SOLUTION INTRAVENOUS PRN
Status: CANCELLED | OUTPATIENT
Start: 2021-12-27

## 2021-12-03 RX ORDER — EPINEPHRINE 1 MG/ML
0.3 INJECTION, SOLUTION, CONCENTRATE INTRAVENOUS PRN
Status: CANCELLED | OUTPATIENT
Start: 2022-01-28

## 2021-12-03 RX ORDER — SODIUM CHLORIDE 9 MG/ML
INJECTION, SOLUTION INTRAVENOUS CONTINUOUS
Status: CANCELLED | OUTPATIENT
Start: 2021-12-27

## 2021-12-03 RX ORDER — SODIUM CHLORIDE 9 MG/ML
20 INJECTION, SOLUTION INTRAVENOUS ONCE
Status: COMPLETED | OUTPATIENT
Start: 2021-12-03 | End: 2021-12-03

## 2021-12-03 RX ORDER — METHYLPREDNISOLONE SODIUM SUCCINATE 125 MG/2ML
125 INJECTION, POWDER, LYOPHILIZED, FOR SOLUTION INTRAMUSCULAR; INTRAVENOUS ONCE
Status: CANCELLED | OUTPATIENT
Start: 2022-01-07 | End: 2021-12-31

## 2021-12-03 RX ORDER — DIPHENHYDRAMINE HYDROCHLORIDE 50 MG/ML
50 INJECTION INTRAMUSCULAR; INTRAVENOUS ONCE
Status: CANCELLED | OUTPATIENT
Start: 2022-01-28 | End: 2022-01-21

## 2021-12-03 RX ORDER — EPINEPHRINE 1 MG/ML
0.3 INJECTION, SOLUTION, CONCENTRATE INTRAVENOUS PRN
Status: CANCELLED | OUTPATIENT
Start: 2021-12-03

## 2021-12-03 RX ORDER — SODIUM CHLORIDE 0.9 % (FLUSH) 0.9 %
10 SYRINGE (ML) INJECTION PRN
Status: DISCONTINUED | OUTPATIENT
Start: 2021-12-03 | End: 2021-12-04 | Stop reason: HOSPADM

## 2021-12-03 RX ORDER — METHYLPREDNISOLONE SODIUM SUCCINATE 125 MG/2ML
125 INJECTION, POWDER, LYOPHILIZED, FOR SOLUTION INTRAMUSCULAR; INTRAVENOUS ONCE
Status: CANCELLED | OUTPATIENT
Start: 2022-01-21 | End: 2022-01-14

## 2021-12-03 RX ORDER — METHYLPREDNISOLONE SODIUM SUCCINATE 125 MG/2ML
125 INJECTION, POWDER, LYOPHILIZED, FOR SOLUTION INTRAMUSCULAR; INTRAVENOUS ONCE
Status: CANCELLED | OUTPATIENT
Start: 2021-12-10 | End: 2021-12-10

## 2021-12-03 RX ORDER — DIPHENHYDRAMINE HYDROCHLORIDE 50 MG/ML
50 INJECTION INTRAMUSCULAR; INTRAVENOUS ONCE
Status: CANCELLED | OUTPATIENT
Start: 2022-01-21 | End: 2022-01-14

## 2021-12-03 RX ORDER — SODIUM CHLORIDE 0.9 % (FLUSH) 0.9 %
10 SYRINGE (ML) INJECTION PRN
Status: CANCELLED | OUTPATIENT
Start: 2021-12-27

## 2021-12-03 RX ORDER — SODIUM CHLORIDE 9 MG/ML
20 INJECTION, SOLUTION INTRAVENOUS ONCE
Status: CANCELLED | OUTPATIENT
Start: 2022-01-21 | End: 2022-01-14

## 2021-12-03 RX ORDER — SODIUM CHLORIDE 9 MG/ML
INJECTION, SOLUTION INTRAVENOUS CONTINUOUS
Status: CANCELLED | OUTPATIENT
Start: 2021-12-10

## 2021-12-03 RX ORDER — SODIUM CHLORIDE 9 MG/ML
20 INJECTION, SOLUTION INTRAVENOUS ONCE
Status: CANCELLED | OUTPATIENT
Start: 2021-12-03 | End: 2021-12-03

## 2021-12-03 RX ORDER — METHYLPREDNISOLONE SODIUM SUCCINATE 125 MG/2ML
125 INJECTION, POWDER, LYOPHILIZED, FOR SOLUTION INTRAMUSCULAR; INTRAVENOUS ONCE
Status: CANCELLED | OUTPATIENT
Start: 2022-01-28 | End: 2022-01-21

## 2021-12-03 RX ORDER — ONDANSETRON 4 MG/1
4 TABLET, ORALLY DISINTEGRATING ORAL EVERY 8 HOURS PRN
Qty: 21 TABLET | Refills: 0 | Status: SHIPPED | OUTPATIENT
Start: 2021-12-03 | End: 2022-03-09 | Stop reason: SDUPTHER

## 2021-12-03 RX ORDER — SODIUM CHLORIDE 9 MG/ML
20 INJECTION, SOLUTION INTRAVENOUS ONCE
Status: CANCELLED | OUTPATIENT
Start: 2022-01-28 | End: 2022-01-21

## 2021-12-03 RX ORDER — SODIUM CHLORIDE 0.9 % (FLUSH) 0.9 %
10 SYRINGE (ML) INJECTION PRN
Status: CANCELLED | OUTPATIENT
Start: 2021-12-03

## 2021-12-03 RX ORDER — SODIUM CHLORIDE 9 MG/ML
INJECTION, SOLUTION INTRAVENOUS CONTINUOUS
Status: CANCELLED | OUTPATIENT
Start: 2021-12-03

## 2021-12-03 RX ORDER — SODIUM CHLORIDE 9 MG/ML
20 INJECTION, SOLUTION INTRAVENOUS ONCE
Status: CANCELLED | OUTPATIENT
Start: 2021-12-10 | End: 2021-12-10

## 2021-12-03 RX ORDER — SODIUM CHLORIDE 0.9 % (FLUSH) 0.9 %
10 SYRINGE (ML) INJECTION PRN
Status: CANCELLED | OUTPATIENT
Start: 2022-01-21

## 2021-12-03 RX ORDER — SODIUM CHLORIDE 0.9 % (FLUSH) 0.9 %
10 SYRINGE (ML) INJECTION PRN
Status: CANCELLED | OUTPATIENT
Start: 2022-01-28

## 2021-12-03 RX ORDER — SODIUM CHLORIDE 0.9 % (FLUSH) 0.9 %
10 SYRINGE (ML) INJECTION PRN
Status: CANCELLED | OUTPATIENT
Start: 2022-01-07

## 2021-12-03 RX ORDER — EPINEPHRINE 1 MG/ML
0.3 INJECTION, SOLUTION, CONCENTRATE INTRAVENOUS PRN
Status: CANCELLED | OUTPATIENT
Start: 2022-01-21

## 2021-12-03 RX ORDER — DIPHENHYDRAMINE HYDROCHLORIDE 50 MG/ML
50 INJECTION INTRAMUSCULAR; INTRAVENOUS ONCE
Status: CANCELLED | OUTPATIENT
Start: 2021-12-03 | End: 2021-12-03

## 2021-12-03 RX ORDER — HEPARIN SODIUM (PORCINE) LOCK FLUSH IV SOLN 100 UNIT/ML 100 UNIT/ML
500 SOLUTION INTRAVENOUS PRN
Status: CANCELLED | OUTPATIENT
Start: 2021-12-10

## 2021-12-03 RX ORDER — SODIUM CHLORIDE 0.9 % (FLUSH) 0.9 %
5 SYRINGE (ML) INJECTION PRN
Status: CANCELLED | OUTPATIENT
Start: 2022-01-28

## 2021-12-03 RX ORDER — EPINEPHRINE 1 MG/ML
0.3 INJECTION, SOLUTION, CONCENTRATE INTRAVENOUS PRN
Status: CANCELLED | OUTPATIENT
Start: 2022-01-07

## 2021-12-03 RX ORDER — SODIUM CHLORIDE 9 MG/ML
INJECTION, SOLUTION INTRAVENOUS CONTINUOUS
Status: CANCELLED | OUTPATIENT
Start: 2022-01-07

## 2021-12-03 RX ORDER — METHYLPREDNISOLONE SODIUM SUCCINATE 125 MG/2ML
125 INJECTION, POWDER, LYOPHILIZED, FOR SOLUTION INTRAMUSCULAR; INTRAVENOUS ONCE
Status: CANCELLED | OUTPATIENT
Start: 2021-12-03 | End: 2021-12-03

## 2021-12-03 RX ORDER — HEPARIN SODIUM (PORCINE) LOCK FLUSH IV SOLN 100 UNIT/ML 100 UNIT/ML
500 SOLUTION INTRAVENOUS PRN
Status: CANCELLED | OUTPATIENT
Start: 2022-01-28

## 2021-12-03 RX ORDER — HEPARIN SODIUM (PORCINE) LOCK FLUSH IV SOLN 100 UNIT/ML 100 UNIT/ML
500 SOLUTION INTRAVENOUS PRN
Status: CANCELLED | OUTPATIENT
Start: 2021-12-03

## 2021-12-03 RX ORDER — SODIUM CHLORIDE 9 MG/ML
20 INJECTION, SOLUTION INTRAVENOUS ONCE
Status: CANCELLED | OUTPATIENT
Start: 2022-01-07 | End: 2021-12-31

## 2021-12-03 RX ORDER — HEPARIN SODIUM (PORCINE) LOCK FLUSH IV SOLN 100 UNIT/ML 100 UNIT/ML
500 SOLUTION INTRAVENOUS PRN
Status: CANCELLED | OUTPATIENT
Start: 2022-01-07

## 2021-12-03 RX ORDER — EPINEPHRINE 1 MG/ML
0.3 INJECTION, SOLUTION, CONCENTRATE INTRAVENOUS PRN
Status: CANCELLED | OUTPATIENT
Start: 2021-12-27

## 2021-12-03 RX ORDER — DIPHENHYDRAMINE HYDROCHLORIDE 50 MG/ML
50 INJECTION INTRAMUSCULAR; INTRAVENOUS ONCE
Status: CANCELLED | OUTPATIENT
Start: 2021-12-10 | End: 2021-12-10

## 2021-12-03 RX ORDER — SODIUM CHLORIDE 9 MG/ML
INJECTION, SOLUTION INTRAVENOUS CONTINUOUS
Status: CANCELLED | OUTPATIENT
Start: 2022-01-28

## 2021-12-03 RX ORDER — DIPHENHYDRAMINE HYDROCHLORIDE 50 MG/ML
50 INJECTION INTRAMUSCULAR; INTRAVENOUS ONCE
Status: CANCELLED | OUTPATIENT
Start: 2022-01-07 | End: 2021-12-31

## 2021-12-03 RX ORDER — EPINEPHRINE 1 MG/ML
0.3 INJECTION, SOLUTION, CONCENTRATE INTRAVENOUS PRN
Status: CANCELLED | OUTPATIENT
Start: 2021-12-10

## 2021-12-03 RX ORDER — SODIUM CHLORIDE 9 MG/ML
20 INJECTION, SOLUTION INTRAVENOUS ONCE
Status: CANCELLED | OUTPATIENT
Start: 2021-12-27 | End: 2021-12-24

## 2021-12-03 RX ADMIN — DEXAMETHASONE SODIUM PHOSPHATE 8 MG: 10 INJECTION INTRAMUSCULAR; INTRAVENOUS at 10:44

## 2021-12-03 RX ADMIN — SODIUM CHLORIDE, PRESERVATIVE FREE 10 ML: 5 INJECTION INTRAVENOUS at 11:39

## 2021-12-03 RX ADMIN — HEPARIN 500 UNITS: 100 SYRINGE at 11:39

## 2021-12-03 RX ADMIN — GEMCITABINE 1800 MG: 38 INJECTION, SOLUTION INTRAVENOUS at 11:01

## 2021-12-03 RX ADMIN — SODIUM CHLORIDE 20 ML/HR: 9 INJECTION, SOLUTION INTRAVENOUS at 10:43

## 2021-12-03 NOTE — TELEPHONE ENCOUNTER
Disability paperwork faxed to [employer] [employer] [facility] at [phone] with confirmation.

## 2021-12-03 NOTE — TELEPHONE ENCOUNTER
Name: Wilfredo Pyle [facility]  : 1965  MRN: [id number]    Oncology Navigation Follow-Up Note    Contact Type:  Medical Oncology  Notes: Pt @ [facility] for Dr. Corby Call f/u & tx. Met with pt & pt's daughter prior to f/u. Pt denied questions/concerns. Instructed pt may contact writer prn. Will continue to follow.     Electronically signed by Dung Edmond RN on 12/3/2021 at 9:33 AM

## 2021-12-03 NOTE — PROGRESS NOTES
Pt here for C11D1 Gemzar. Pt seen by Dr Panfilo Fofana prior to treatment, refer to his note. Labs drawn from port and results reviewed. Pt was treated without incident and d/c'd in stable condition. Pt will return on 12-10-21 for C11D8.

## 2021-12-03 NOTE — PROGRESS NOTES
_      Chief Complaint   Patient presents with    Follow-up    Pain     back at 7-8    Constipation    Fatigue     improved     DIAGNOSIS:       Recurrent ovarian cancer. Original diagnosis 2005 with multiple recurrences. Diffuse metastasis. CURRENT THERAPY:         Doxil/ Avastin started 9/18/2020. Avastin was discontinued due to recent GI bleeding. Status post recent vascular embolization  S/p seizure disorder. Gemzar started 2/26/2021. Interrupted due to hospitalization and problem with compliance. BRIEF CASE HISTORY:      Ms. Starla Haynes is a very pleasant 60 y.o. female with history of multiple co morbidities as listed. The patient seen in consultation for ovarian cancer with multiple recurrences. 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 She had surgical debulking and she had systemic treatment with Taxol and carboplatin for 6 cycles. Patient was in remission for about 2 years. She had a relapse in 2007 and treated with topotecan with good results. Patient relapsed again in 2008 and was treated with Taxol carboplatin. After 3 cycles of systemic chemotherapy she had problems with carboplatin so she finished 3 more cycles with Taxol. She did well again for 2 to 3 years until she had a relapse in 2012 and she had intraperitoneal chemotherapy treatment with Taxol. Patient was last seen by her oncologist in 2014 at which time she had relatively stable disease with normal tumor marker and no significant abnormal images. Patient moved to [place] after that and she was lost for oncology follow-ups. Patient was recently evaluated again here in [facility] because of abdominal discomfort. Re imaging confirmed metastatic relapse. Biopsy confirmed ovarian cancer recurrence. Scan showed extensive intraabdominal and splenic involvement and lung mets. sertraline, levetiracetam, eliquis, and morphine, and the following Facility-Administered Medications: sodium chloride flush and heparin flush. ALLERGIES:  is allergic to ceftriaxone. FAMILY HISTORY: Negative for any hematological or oncological conditions. SOCIAL HISTORY:  reports that she has been smoking cigarettes. She has been smoking about 1.00 pack per day. She uses smokeless tobacco. She reports previous alcohol use. She reports that she does not use drugs. REVIEW OF SYSTEMS:     · General: No weakness or fatigue. No unanticipated weight loss or decreased appetite. No fever or chills. · Eyes: No blurred vision, eye pain or double vision. · Ears: No hearing problems or drainage. No tinnitus. · Throat: No sore throat, problems with swallowing or dysphagia. · Respiratory: No cough, sputum or hemoptysis. No shortness of breath. No pleuritic chest pain. · Cardiovascular: No chest pain, orthopnea or PND. No lower extremity edema. No palpitation. · Gastrointestinal: As above. · Genitourinary: No dysuria, hematuria, frequency or urgency. · Musculoskeletal: No muscle aches or pains. No limitation of movement. No back pain. No gait disturbance, No joint complaints. · Dermatologic: No skin rashes or pruritus. No skin lesions or discolorations. · Psychiatric: No depression, anxiety, or stress or signs of schizophrenia. No change in mood or affect. · Hematologic: No history of bleeding tendency. No bruises or ecchymosis. No history of clotting problems. · Infectious disease: No fever, chills or frequent infections. · Endocrine: No polydipsia or polyuria. No temperature intolerance. · Neurologic: No headaches or dizziness. No weakness or numbness of the extremities. No changes in balance, coordination,  memory, mentation, behavior. · Allergic/Immunologic: No nasal congestion or hives. No repeated infections. PHYSICAL EXAM:  The patient is not in acute distress.   Vital signs: Blood pressure 125/77, pulse 81, temperature 98.1 °F (36.7 °C), temperature source Oral, weight 161 lb 12.8 oz (73.4 kg). General appearance - well appearing, not in pain or distress  Mental status - good mood, alert and oriented  Eyes - pupils equal and reactive, extraocular eye movements intact  Ears - bilateral TM's and external ear canals normal  Nose - normal and patent, no erythema, discharge or polyps  Mouth - mucous membranes moist, pharynx normal without lesions  Neck - supple, no significant adenopathy  Lymphatics - no palpable lymphadenopathy, no hepatosplenomegaly  Chest - clear to auscultation, no wheezes, rales or rhonchi, symmetric air entry  Heart - normal rate, regular rhythm, normal S1, S2, no murmurs, rubs, clicks or gallops  Abdomen - soft, nontender, nondistended, no masses or organomegaly  Neurological - alert, oriented, normal speech, no focal findings or movement disorder noted  Musculoskeletal - no joint tenderness, deformity or swelling  Extremities - peripheral pulses normal, no pedal edema, no clubbing or cyanosis  Skin - normal coloration and turgor, no rashes, no suspicious skin lesions noted     Review of Diagnostic data:   Lab Results   Component Value Date    WBC 7.8 12/03/2021    HGB 11.4 (L) 12/03/2021    HCT 36.6 12/03/2021    MCV 89.4 12/03/2021     (H) 12/03/2021       Chemistry        Component Value Date/Time     12/03/2021 0918    K 3.9 12/03/2021 0918     12/03/2021 0918    CO2 26 12/03/2021 0918    BUN 9 12/03/2021 0918    CREATININE 0.52 12/03/2021 0918        Component Value Date/Time    CALCIUM 8.7 12/03/2021 0918    ALKPHOS 73 12/03/2021 0918    AST 9 12/03/2021 0918    ALT 5 12/03/2021 0918    BILITOT 0.13 (L) 12/03/2021 0918          Lab Results   Component Value Date     297 (H) 12/03/2021         IMPRESSION:   Recurrent ovarian cancer. Original diagnosis 2005 with multiple recurrences. Diffuse metastasis.    Recent active intra-abdominal bleeding due to splenic mass with GI infiltration. Status post embolization  S/p seizure disorder. PLAN:   Discussed palliative treatment. Different regimens were discussed. We prescribed Doxil as single agent but insurance declined. Patient was started on Doxil and Avastin. She tolerated treatment fairly well. However due to recent episode of intra-abdominal bleeding we will discontinue Avastin. Next treatment will be with the use of Doxil. Patient had embolization by vascular. s/p hospitalization for seizure. No brain mets. Seizures controlled. Hospitalized last week with left leg DVT and anemia. Hemoglobin is now stable. She received blood transfusion. No active bleeding. She is currently on Eliquis for DVT. Tolerated well with no side effects. S/p hospitalization for severe anemia. S/p blood transfusion. Currently patient is relatively stable and tolerating chemotherapy fairly well. Repeated tumor marker today showed some drop down to 297. I will continue on treatment until progression or intolerable side effects. We will monitor labs and give blood transfusion for symptomatic anemia. Patient's questions were answered to the best of her satisfaction and she verbalized full understanding and agreement.

## 2021-12-10 ENCOUNTER — HOSPITAL ENCOUNTER (OUTPATIENT)
Dept: INFUSION THERAPY | Age: 56
Discharge: HOME | End: 2021-12-10
Payer: COMMERCIAL

## 2021-12-10 VITALS
DIASTOLIC BLOOD PRESSURE: 78 MMHG | RESPIRATION RATE: 16 BRPM | SYSTOLIC BLOOD PRESSURE: 146 MMHG | HEART RATE: 93 BPM | WEIGHT: 163.8 LBS | BODY MASS INDEX: 27.26 KG/M2 | TEMPERATURE: 98.8 F

## 2021-12-10 DIAGNOSIS — C56.9 MALIGNANT NEOPLASM OF OVARY, UNSPECIFIED LATERALITY (HCC): Primary | ICD-10-CM

## 2021-12-10 DIAGNOSIS — C79.51 CANCER, METASTATIC TO BONE (HCC): ICD-10-CM

## 2021-12-10 DIAGNOSIS — Z85.43 HISTORY OF OVARIAN CANCER: ICD-10-CM

## 2021-12-10 LAB
ABSOLUTE EOS #: 0.1 K/UL (ref 0–0.4)
ABSOLUTE IMMATURE GRANULOCYTE: ABNORMAL K/UL (ref 0–0.3)
ABSOLUTE LYMPH #: 1.2 K/UL (ref 1–4.8)
ABSOLUTE MONO #: 0.43 K/UL (ref 0.1–1.2)
ALBUMIN SERPL-MCNC: 3.8 G/DL (ref 3.5–5.2)
ALBUMIN/GLOBULIN RATIO: 1.7 (ref 1–2.5)
ALP BLD-CCNC: 68 U/L (ref 35–104)
ALT SERPL-CCNC: 7 U/L (ref 5–33)
ANION GAP SERPL CALCULATED.3IONS-SCNC: 9 MMOL/L (ref 9–17)
AST SERPL-CCNC: 10 U/L
BASOPHILS # BLD: 2 % (ref 0–2)
BASOPHILS ABSOLUTE: 0.1 K/UL (ref 0–0.2)
BILIRUB SERPL-MCNC: 0.1 MG/DL (ref 0.3–1.2)
BUN BLDV-MCNC: 10 MG/DL (ref 6–20)
BUN/CREAT BLD: ABNORMAL (ref 9–20)
CALCIUM SERPL-MCNC: 8.5 MG/DL (ref 8.6–10.4)
CHLORIDE BLD-SCNC: 105 MMOL/L (ref 98–107)
CO2: 26 MMOL/L (ref 20–31)
CREAT SERPL-MCNC: 0.47 MG/DL (ref 0.5–0.9)
DIFFERENTIAL TYPE: ABNORMAL
EOSINOPHILS RELATIVE PERCENT: 2 % (ref 1–4)
GFR AFRICAN AMERICAN: >60 ML/MIN
GFR NON-AFRICAN AMERICAN: >60 ML/MIN
GFR SERPL CREATININE-BSD FRML MDRD: ABNORMAL ML/MIN/{1.73_M2}
GFR SERPL CREATININE-BSD FRML MDRD: ABNORMAL ML/MIN/{1.73_M2}
GLUCOSE BLD-MCNC: 106 MG/DL (ref 70–99)
HCT VFR BLD CALC: 35.1 % (ref 36–46)
HEMOGLOBIN: 11.2 G/DL (ref 12–16)
IMMATURE GRANULOCYTES: ABNORMAL %
LYMPHOCYTES # BLD: 25 % (ref 24–44)
MCH RBC QN AUTO: 28.6 PG (ref 26–34)
MCHC RBC AUTO-ENTMCNC: 32 G/DL (ref 31–37)
MCV RBC AUTO: 89.2 FL (ref 80–100)
MONOCYTES # BLD: 9 % (ref 2–11)
MORPHOLOGY: ABNORMAL
MORPHOLOGY: ABNORMAL
NRBC AUTOMATED: ABNORMAL PER 100 WBC
PDW BLD-RTO: 24.3 % (ref 12.5–15.4)
PLATELET # BLD: 491 K/UL (ref 140–450)
PLATELET ESTIMATE: ABNORMAL
PMV BLD AUTO: 6.9 FL (ref 6–12)
POTASSIUM SERPL-SCNC: 3.8 MMOL/L (ref 3.7–5.3)
RBC # BLD: 3.93 M/UL (ref 4–5.2)
RBC # BLD: ABNORMAL 10*6/UL
SEG NEUTROPHILS: 62 % (ref 36–66)
SEGMENTED NEUTROPHILS ABSOLUTE COUNT: 2.97 K/UL (ref 1.8–7.7)
SODIUM BLD-SCNC: 140 MMOL/L (ref 135–144)
TOTAL PROTEIN: 6 G/DL (ref 6.4–8.3)
WBC # BLD: 4.8 K/UL (ref 3.5–11)
WBC # BLD: ABNORMAL 10*3/UL

## 2021-12-10 PROCEDURE — 85025 COMPLETE CBC W/AUTO DIFF WBC: CPT

## 2021-12-10 PROCEDURE — 6360000002 HC RX W HCPCS: Performed by: INTERNAL MEDICINE

## 2021-12-10 PROCEDURE — 2580000003 HC RX 258: Performed by: INTERNAL MEDICINE

## 2021-12-10 PROCEDURE — 36591 DRAW BLOOD OFF VENOUS DEVICE: CPT

## 2021-12-10 PROCEDURE — 96375 TX/PRO/DX INJ NEW DRUG ADDON: CPT

## 2021-12-10 PROCEDURE — 80053 COMPREHEN METABOLIC PANEL: CPT

## 2021-12-10 PROCEDURE — 96413 CHEMO IV INFUSION 1 HR: CPT

## 2021-12-10 RX ORDER — AZITHROMYCIN 250 MG/1
250 TABLET, FILM COATED ORAL SEE ADMIN INSTRUCTIONS
Qty: 6 TABLET | Refills: 0 | Status: SHIPPED | OUTPATIENT
Start: 2021-12-10 | End: 2021-12-15

## 2021-12-10 RX ORDER — HEPARIN SODIUM (PORCINE) LOCK FLUSH IV SOLN 100 UNIT/ML 100 UNIT/ML
500 SOLUTION INTRAVENOUS PRN
Status: DISCONTINUED | OUTPATIENT
Start: 2021-12-10 | End: 2021-12-11 | Stop reason: HOSPADM

## 2021-12-10 RX ORDER — SODIUM CHLORIDE 9 MG/ML
20 INJECTION, SOLUTION INTRAVENOUS ONCE
Status: COMPLETED | OUTPATIENT
Start: 2021-12-10 | End: 2021-12-10

## 2021-12-10 RX ORDER — DEXAMETHASONE SODIUM PHOSPHATE 4 MG/ML
8 INJECTION, SOLUTION INTRA-ARTICULAR; INTRALESIONAL; INTRAMUSCULAR; INTRAVENOUS; SOFT TISSUE ONCE
Status: COMPLETED | OUTPATIENT
Start: 2021-12-10 | End: 2021-12-10

## 2021-12-10 RX ORDER — SODIUM CHLORIDE 0.9 % (FLUSH) 0.9 %
10 SYRINGE (ML) INJECTION PRN
Status: DISCONTINUED | OUTPATIENT
Start: 2021-12-10 | End: 2021-12-11 | Stop reason: HOSPADM

## 2021-12-10 RX ADMIN — SODIUM CHLORIDE, PRESERVATIVE FREE 10 ML: 5 INJECTION INTRAVENOUS at 12:22

## 2021-12-10 RX ADMIN — HEPARIN 500 UNITS: 100 SYRINGE at 12:23

## 2021-12-10 RX ADMIN — SODIUM CHLORIDE 20 ML/HR: 9 INJECTION, SOLUTION INTRAVENOUS at 11:16

## 2021-12-10 RX ADMIN — DEXAMETHASONE SODIUM PHOSPHATE 8 MG: 4 INJECTION, SOLUTION INTRAMUSCULAR; INTRAVENOUS at 11:16

## 2021-12-10 RX ADMIN — SODIUM CHLORIDE, PRESERVATIVE FREE 10 ML: 5 INJECTION INTRAVENOUS at 10:25

## 2021-12-10 RX ADMIN — GEMCITABINE 1800 MG: 38 INJECTION, SOLUTION INTRAVENOUS at 11:47

## 2021-12-10 NOTE — PROGRESS NOTES
Patient arrived for [facility] collected and reviewed   Pt states she \"broke\"her right fifth toe digit last Friday, states was black and blue now red, showed writer slight redness noted to top of foot only not toes, pt requesting antibiotic (zpack) discussed with Dr Kateryna Baird ok to call into pts pharmacy   Tolerated w/o incident   Return 12/27 tx 1/3 dr Herminio Cedillo, RN

## 2021-12-27 ENCOUNTER — HOSPITAL ENCOUNTER (OUTPATIENT)
Dept: INFUSION THERAPY | Age: 56
Discharge: HOME | End: 2021-12-27
Payer: COMMERCIAL

## 2021-12-27 VITALS
HEART RATE: 85 BPM | BODY MASS INDEX: 27.32 KG/M2 | DIASTOLIC BLOOD PRESSURE: 70 MMHG | TEMPERATURE: 98.5 F | RESPIRATION RATE: 18 BRPM | SYSTOLIC BLOOD PRESSURE: 157 MMHG | WEIGHT: 164.2 LBS

## 2021-12-27 DIAGNOSIS — C79.51 CANCER, METASTATIC TO BONE (HCC): ICD-10-CM

## 2021-12-27 DIAGNOSIS — Z85.43 HISTORY OF OVARIAN CANCER: ICD-10-CM

## 2021-12-27 DIAGNOSIS — M54.59 INTRACTABLE LOW BACK PAIN: ICD-10-CM

## 2021-12-27 DIAGNOSIS — C56.9 MALIGNANT NEOPLASM OF OVARY, UNSPECIFIED LATERALITY (HCC): Primary | ICD-10-CM

## 2021-12-27 LAB
ABSOLUTE EOS #: 0.44 K/UL (ref 0–0.4)
ABSOLUTE IMMATURE GRANULOCYTE: ABNORMAL K/UL (ref 0–0.3)
ABSOLUTE LYMPH #: 1.24 K/UL (ref 1–4.8)
ABSOLUTE MONO #: 0.66 K/UL (ref 0.1–0.8)
ALBUMIN SERPL-MCNC: 3.8 G/DL (ref 3.5–5.2)
ALBUMIN/GLOBULIN RATIO: 1.5 (ref 1–2.5)
ALP BLD-CCNC: 68 U/L (ref 35–104)
ALT SERPL-CCNC: 6 U/L (ref 5–33)
ANION GAP SERPL CALCULATED.3IONS-SCNC: 10 MMOL/L (ref 9–17)
AST SERPL-CCNC: 12 U/L
BASOPHILS # BLD: 2 % (ref 0–2)
BASOPHILS ABSOLUTE: 0.15 K/UL (ref 0–0.2)
BILIRUB SERPL-MCNC: <0.1 MG/DL (ref 0.3–1.2)
BUN BLDV-MCNC: 10 MG/DL (ref 6–20)
BUN/CREAT BLD: ABNORMAL (ref 9–20)
CA 125: 333 U/ML
CALCIUM SERPL-MCNC: 8.6 MG/DL (ref 8.6–10.4)
CHLORIDE BLD-SCNC: 102 MMOL/L (ref 98–107)
CO2: 24 MMOL/L (ref 20–31)
CREAT SERPL-MCNC: 0.45 MG/DL (ref 0.5–0.9)
DIFFERENTIAL TYPE: ABNORMAL
EOSINOPHILS RELATIVE PERCENT: 6 % (ref 1–4)
GFR AFRICAN AMERICAN: >60 ML/MIN
GFR NON-AFRICAN AMERICAN: >60 ML/MIN
GFR SERPL CREATININE-BSD FRML MDRD: ABNORMAL ML/MIN/{1.73_M2}
GFR SERPL CREATININE-BSD FRML MDRD: ABNORMAL ML/MIN/{1.73_M2}
GLUCOSE BLD-MCNC: 98 MG/DL (ref 70–99)
HCT VFR BLD CALC: 34.3 % (ref 36–46)
HEMOGLOBIN: 10.7 G/DL (ref 12–16)
IMMATURE GRANULOCYTES: ABNORMAL %
LYMPHOCYTES # BLD: 17 % (ref 24–44)
MCH RBC QN AUTO: 28.8 PG (ref 26–34)
MCHC RBC AUTO-ENTMCNC: 31.2 G/DL (ref 31–37)
MCV RBC AUTO: 92 FL (ref 80–100)
MONOCYTES # BLD: 9 % (ref 1–7)
MORPHOLOGY: ABNORMAL
MORPHOLOGY: ABNORMAL
NRBC AUTOMATED: ABNORMAL PER 100 WBC
PDW BLD-RTO: 19.3 % (ref 12.5–15.4)
PLATELET # BLD: 1218 K/UL (ref 140–450)
PLATELET ESTIMATE: ABNORMAL
PMV BLD AUTO: 6.8 FL (ref 6–12)
POTASSIUM SERPL-SCNC: 3.8 MMOL/L (ref 3.7–5.3)
RBC # BLD: 3.73 M/UL (ref 4–5.2)
RBC # BLD: ABNORMAL 10*6/UL
SEG NEUTROPHILS: 66 % (ref 36–66)
SEGMENTED NEUTROPHILS ABSOLUTE COUNT: 4.81 K/UL (ref 1.8–7.7)
SODIUM BLD-SCNC: 136 MMOL/L (ref 135–144)
TOTAL PROTEIN: 6.3 G/DL (ref 6.4–8.3)
WBC # BLD: 7.3 K/UL (ref 3.5–11)
WBC # BLD: ABNORMAL 10*3/UL

## 2021-12-27 PROCEDURE — 96413 CHEMO IV INFUSION 1 HR: CPT

## 2021-12-27 PROCEDURE — 86304 IMMUNOASSAY TUMOR CA 125: CPT

## 2021-12-27 PROCEDURE — 96375 TX/PRO/DX INJ NEW DRUG ADDON: CPT

## 2021-12-27 PROCEDURE — 85025 COMPLETE CBC W/AUTO DIFF WBC: CPT

## 2021-12-27 PROCEDURE — 6360000002 HC RX W HCPCS: Performed by: INTERNAL MEDICINE

## 2021-12-27 PROCEDURE — 2580000003 HC RX 258: Performed by: INTERNAL MEDICINE

## 2021-12-27 PROCEDURE — 36591 DRAW BLOOD OFF VENOUS DEVICE: CPT

## 2021-12-27 PROCEDURE — 80053 COMPREHEN METABOLIC PANEL: CPT

## 2021-12-27 RX ORDER — DEXAMETHASONE SODIUM PHOSPHATE 10 MG/ML
8 INJECTION INTRAMUSCULAR; INTRAVENOUS ONCE
Status: COMPLETED | OUTPATIENT
Start: 2021-12-27 | End: 2021-12-27

## 2021-12-27 RX ORDER — SODIUM CHLORIDE 0.9 % (FLUSH) 0.9 %
10 SYRINGE (ML) INJECTION PRN
Status: DISCONTINUED | OUTPATIENT
Start: 2021-12-27 | End: 2021-12-28 | Stop reason: HOSPADM

## 2021-12-27 RX ORDER — HEPARIN SODIUM (PORCINE) LOCK FLUSH IV SOLN 100 UNIT/ML 100 UNIT/ML
500 SOLUTION INTRAVENOUS PRN
Status: DISCONTINUED | OUTPATIENT
Start: 2021-12-27 | End: 2021-12-28 | Stop reason: HOSPADM

## 2021-12-27 RX ORDER — SODIUM CHLORIDE 9 MG/ML
20 INJECTION, SOLUTION INTRAVENOUS ONCE
Status: COMPLETED | OUTPATIENT
Start: 2021-12-27 | End: 2021-12-27

## 2021-12-27 RX ADMIN — SODIUM CHLORIDE, PRESERVATIVE FREE 10 ML: 5 INJECTION INTRAVENOUS at 15:14

## 2021-12-27 RX ADMIN — HEPARIN 500 UNITS: 100 SYRINGE at 15:14

## 2021-12-27 RX ADMIN — DEXAMETHASONE SODIUM PHOSPHATE 8 MG: 10 INJECTION INTRAMUSCULAR; INTRAVENOUS at 13:58

## 2021-12-27 RX ADMIN — SODIUM CHLORIDE, PRESERVATIVE FREE 10 ML: 5 INJECTION INTRAVENOUS at 13:10

## 2021-12-27 RX ADMIN — SODIUM CHLORIDE, PRESERVATIVE FREE 10 ML: 5 INJECTION INTRAVENOUS at 13:11

## 2021-12-27 RX ADMIN — GEMCITABINE 1800 MG: 38 INJECTION, SOLUTION INTRAVENOUS at 14:34

## 2021-12-27 RX ADMIN — SODIUM CHLORIDE 20 ML/HR: 9 INJECTION, SOLUTION INTRAVENOUS at 13:56

## 2021-12-27 NOTE — PROGRESS NOTES
Patient arrived for [address] collected and reviewed   Tolerated w/o incident   Return 1/3  Christine Mullins RN

## 2021-12-28 DIAGNOSIS — M54.59 INTRACTABLE LOW BACK PAIN: ICD-10-CM

## 2021-12-28 DIAGNOSIS — C79.51 CANCER, METASTATIC TO BONE (HCC): ICD-10-CM

## 2021-12-28 RX ORDER — OXYCODONE HYDROCHLORIDE AND ACETAMINOPHEN 5; 325 MG/1; MG/1
TABLET ORAL
Qty: 180 TABLET | Refills: 0 | OUTPATIENT
Start: 2021-12-28 | End: 2022-01-27

## 2021-12-28 RX ORDER — OXYCODONE HYDROCHLORIDE AND ACETAMINOPHEN 5; 325 MG/1; MG/1
TABLET ORAL
Qty: 180 TABLET | Refills: 0 | Status: SHIPPED | OUTPATIENT
Start: 2021-12-28 | End: 2022-01-27

## 2021-12-28 RX ORDER — MORPHINE SULFATE 15 MG/1
TABLET, FILM COATED, EXTENDED RELEASE ORAL
Qty: 60 TABLET | Refills: 0 | OUTPATIENT
Start: 2021-12-28 | End: 2022-01-27

## 2021-12-28 RX ORDER — MORPHINE SULFATE 30 MG/1
TABLET, FILM COATED, EXTENDED RELEASE ORAL
Qty: 60 TABLET | Refills: 0 | OUTPATIENT
Start: 2021-12-28 | End: 2022-01-27

## 2022-01-01 RX ORDER — MORPHINE SULFATE 15 MG/1
15 TABLET, FILM COATED, EXTENDED RELEASE ORAL 2 TIMES DAILY
Qty: 60 TABLET | Refills: 0 | Status: SHIPPED | OUTPATIENT
Start: 2022-01-01 | End: 2022-01-27

## 2022-01-01 RX ORDER — MORPHINE SULFATE 30 MG/1
30 TABLET, FILM COATED, EXTENDED RELEASE ORAL 2 TIMES DAILY
Qty: 60 TABLET | Refills: 0 | Status: SHIPPED | OUTPATIENT
Start: 2022-01-01 | End: 2022-01-27

## 2022-01-03 RX ORDER — PANTOPRAZOLE SODIUM 40 MG/1
40 TABLET, DELAYED RELEASE ORAL DAILY
Qty: 60 TABLET | Refills: 0 | Status: SHIPPED | OUTPATIENT
Start: 2022-01-03 | End: 2022-03-17

## 2022-01-07 ENCOUNTER — OFFICE VISIT (OUTPATIENT)
Dept: ONCOLOGY | Age: 57
End: 2022-01-07
Payer: COMMERCIAL

## 2022-01-07 ENCOUNTER — HOSPITAL ENCOUNTER (OUTPATIENT)
Dept: INFUSION THERAPY | Age: 57
Discharge: HOME | End: 2022-01-07
Payer: COMMERCIAL

## 2022-01-07 VITALS
WEIGHT: 161.4 LBS | RESPIRATION RATE: 18 BRPM | TEMPERATURE: 98.7 F | SYSTOLIC BLOOD PRESSURE: 152 MMHG | HEART RATE: 96 BPM | BODY MASS INDEX: 26.86 KG/M2 | DIASTOLIC BLOOD PRESSURE: 73 MMHG

## 2022-01-07 VITALS
TEMPERATURE: 98.7 F | BODY MASS INDEX: 26.79 KG/M2 | DIASTOLIC BLOOD PRESSURE: 73 MMHG | HEART RATE: 96 BPM | WEIGHT: 161 LBS | SYSTOLIC BLOOD PRESSURE: 152 MMHG | RESPIRATION RATE: 18 BRPM

## 2022-01-07 DIAGNOSIS — Z85.43 HISTORY OF OVARIAN CANCER: ICD-10-CM

## 2022-01-07 DIAGNOSIS — C56.9 MALIGNANT NEOPLASM OF OVARY, UNSPECIFIED LATERALITY (HCC): Primary | ICD-10-CM

## 2022-01-07 DIAGNOSIS — F32.A DEPRESSION, UNSPECIFIED DEPRESSION TYPE: ICD-10-CM

## 2022-01-07 DIAGNOSIS — C79.51 CANCER, METASTATIC TO BONE (HCC): ICD-10-CM

## 2022-01-07 DIAGNOSIS — G47.00 INSOMNIA, UNSPECIFIED TYPE: ICD-10-CM

## 2022-01-07 LAB
ABSOLUTE EOS #: 0.17 K/UL (ref 0–0.4)
ABSOLUTE IMMATURE GRANULOCYTE: ABNORMAL K/UL (ref 0–0.3)
ABSOLUTE LYMPH #: 1.74 K/UL (ref 1–4.8)
ABSOLUTE MONO #: 0.83 K/UL (ref 0.1–1.2)
ALBUMIN SERPL-MCNC: 4 G/DL (ref 3.5–5.2)
ALBUMIN/GLOBULIN RATIO: 1.6 (ref 1–2.5)
ALP BLD-CCNC: 67 U/L (ref 35–104)
ALT SERPL-CCNC: 7 U/L (ref 5–33)
ANION GAP SERPL CALCULATED.3IONS-SCNC: 9 MMOL/L (ref 9–17)
AST SERPL-CCNC: 12 U/L
BASOPHILS # BLD: 1 % (ref 0–2)
BASOPHILS ABSOLUTE: 0.08 K/UL (ref 0–0.2)
BILIRUB SERPL-MCNC: 0.1 MG/DL (ref 0.3–1.2)
BUN BLDV-MCNC: 10 MG/DL (ref 6–20)
BUN/CREAT BLD: ABNORMAL (ref 9–20)
CALCIUM SERPL-MCNC: 8.7 MG/DL (ref 8.6–10.4)
CHLORIDE BLD-SCNC: 102 MMOL/L (ref 98–107)
CO2: 24 MMOL/L (ref 20–31)
CREAT SERPL-MCNC: 0.48 MG/DL (ref 0.5–0.9)
DIFFERENTIAL TYPE: ABNORMAL
EOSINOPHILS RELATIVE PERCENT: 2 % (ref 1–4)
GFR AFRICAN AMERICAN: >60 ML/MIN
GFR NON-AFRICAN AMERICAN: >60 ML/MIN
GFR SERPL CREATININE-BSD FRML MDRD: ABNORMAL ML/MIN/{1.73_M2}
GFR SERPL CREATININE-BSD FRML MDRD: ABNORMAL ML/MIN/{1.73_M2}
GLUCOSE BLD-MCNC: 104 MG/DL (ref 70–99)
HCT VFR BLD CALC: 33.2 % (ref 36–46)
HEMOGLOBIN: 10.6 G/DL (ref 12–16)
IMMATURE GRANULOCYTES: ABNORMAL %
LYMPHOCYTES # BLD: 21 % (ref 24–44)
MCH RBC QN AUTO: 29.8 PG (ref 26–34)
MCHC RBC AUTO-ENTMCNC: 32.1 G/DL (ref 31–37)
MCV RBC AUTO: 93 FL (ref 80–100)
MONOCYTES # BLD: 10 % (ref 2–11)
MORPHOLOGY: ABNORMAL
MORPHOLOGY: ABNORMAL
NRBC AUTOMATED: ABNORMAL PER 100 WBC
PDW BLD-RTO: 18.2 % (ref 12.5–15.4)
PLATELET # BLD: 483 K/UL (ref 140–450)
PLATELET ESTIMATE: ABNORMAL
PMV BLD AUTO: 6.5 FL (ref 6–12)
POTASSIUM SERPL-SCNC: 4 MMOL/L (ref 3.7–5.3)
RBC # BLD: 3.57 M/UL (ref 4–5.2)
RBC # BLD: ABNORMAL 10*6/UL
SEG NEUTROPHILS: 66 % (ref 36–66)
SEGMENTED NEUTROPHILS ABSOLUTE COUNT: 5.48 K/UL (ref 1.8–7.7)
SODIUM BLD-SCNC: 135 MMOL/L (ref 135–144)
TOTAL PROTEIN: 6.5 G/DL (ref 6.4–8.3)
WBC # BLD: 8.3 K/UL (ref 3.5–11)
WBC # BLD: ABNORMAL 10*3/UL

## 2022-01-07 PROCEDURE — 80053 COMPREHEN METABOLIC PANEL: CPT

## 2022-01-07 PROCEDURE — 99214 OFFICE O/P EST MOD 30 MIN: CPT | Performed by: INTERNAL MEDICINE

## 2022-01-07 PROCEDURE — 96375 TX/PRO/DX INJ NEW DRUG ADDON: CPT

## 2022-01-07 PROCEDURE — 6360000002 HC RX W HCPCS: Performed by: INTERNAL MEDICINE

## 2022-01-07 PROCEDURE — G8417 CALC BMI ABV UP PARAM F/U: HCPCS | Performed by: INTERNAL MEDICINE

## 2022-01-07 PROCEDURE — 2580000003 HC RX 258: Performed by: INTERNAL MEDICINE

## 2022-01-07 PROCEDURE — 3017F COLORECTAL CA SCREEN DOC REV: CPT | Performed by: INTERNAL MEDICINE

## 2022-01-07 PROCEDURE — G8484 FLU IMMUNIZE NO ADMIN: HCPCS | Performed by: INTERNAL MEDICINE

## 2022-01-07 PROCEDURE — 96413 CHEMO IV INFUSION 1 HR: CPT

## 2022-01-07 PROCEDURE — 85025 COMPLETE CBC W/AUTO DIFF WBC: CPT

## 2022-01-07 PROCEDURE — 36591 DRAW BLOOD OFF VENOUS DEVICE: CPT

## 2022-01-07 PROCEDURE — 4004F PT TOBACCO SCREEN RCVD TLK: CPT | Performed by: INTERNAL MEDICINE

## 2022-01-07 PROCEDURE — G8427 DOCREV CUR MEDS BY ELIG CLIN: HCPCS | Performed by: INTERNAL MEDICINE

## 2022-01-07 RX ORDER — LORAZEPAM 1 MG/1
1 TABLET ORAL EVERY 8 HOURS PRN
Qty: 90 TABLET | Refills: 0 | Status: SHIPPED | OUTPATIENT
Start: 2022-01-07 | End: 2022-04-08 | Stop reason: SDUPTHER

## 2022-01-07 RX ORDER — DIPHENHYDRAMINE HYDROCHLORIDE 50 MG/ML
50 INJECTION INTRAMUSCULAR; INTRAVENOUS ONCE
Status: CANCELLED | OUTPATIENT
Start: 2022-02-25 | End: 2022-02-18

## 2022-01-07 RX ORDER — SODIUM CHLORIDE 9 MG/ML
INJECTION, SOLUTION INTRAVENOUS CONTINUOUS
Status: CANCELLED | OUTPATIENT
Start: 2022-02-18

## 2022-01-07 RX ORDER — SODIUM CHLORIDE 9 MG/ML
20 INJECTION, SOLUTION INTRAVENOUS ONCE
Status: CANCELLED | OUTPATIENT
Start: 2022-02-25 | End: 2022-02-18

## 2022-01-07 RX ORDER — DEXAMETHASONE SODIUM PHOSPHATE 10 MG/ML
8 INJECTION INTRAMUSCULAR; INTRAVENOUS ONCE
Status: COMPLETED | OUTPATIENT
Start: 2022-01-07 | End: 2022-01-07

## 2022-01-07 RX ORDER — SODIUM CHLORIDE 9 MG/ML
20 INJECTION, SOLUTION INTRAVENOUS ONCE
Status: COMPLETED | OUTPATIENT
Start: 2022-01-07 | End: 2022-01-07

## 2022-01-07 RX ORDER — EPINEPHRINE 1 MG/ML
0.3 INJECTION, SOLUTION, CONCENTRATE INTRAVENOUS PRN
Status: CANCELLED | OUTPATIENT
Start: 2022-02-18

## 2022-01-07 RX ORDER — SODIUM CHLORIDE 0.9 % (FLUSH) 0.9 %
10 SYRINGE (ML) INJECTION PRN
Status: CANCELLED | OUTPATIENT
Start: 2022-02-25

## 2022-01-07 RX ORDER — SODIUM CHLORIDE 0.9 % (FLUSH) 0.9 %
5 SYRINGE (ML) INJECTION PRN
Status: CANCELLED | OUTPATIENT
Start: 2022-02-18

## 2022-01-07 RX ORDER — SODIUM CHLORIDE 0.9 % (FLUSH) 0.9 %
5 SYRINGE (ML) INJECTION PRN
Status: CANCELLED | OUTPATIENT
Start: 2022-02-25

## 2022-01-07 RX ORDER — SODIUM CHLORIDE 9 MG/ML
INJECTION, SOLUTION INTRAVENOUS CONTINUOUS
Status: CANCELLED | OUTPATIENT
Start: 2022-02-25

## 2022-01-07 RX ORDER — EPINEPHRINE 1 MG/ML
0.3 INJECTION, SOLUTION, CONCENTRATE INTRAVENOUS PRN
Status: CANCELLED | OUTPATIENT
Start: 2022-02-25

## 2022-01-07 RX ORDER — SODIUM CHLORIDE 9 MG/ML
20 INJECTION, SOLUTION INTRAVENOUS ONCE
Status: CANCELLED | OUTPATIENT
Start: 2022-02-18 | End: 2022-02-11

## 2022-01-07 RX ORDER — SODIUM CHLORIDE 0.9 % (FLUSH) 0.9 %
10 SYRINGE (ML) INJECTION PRN
Status: CANCELLED | OUTPATIENT
Start: 2022-02-18

## 2022-01-07 RX ORDER — HEPARIN SODIUM (PORCINE) LOCK FLUSH IV SOLN 100 UNIT/ML 100 UNIT/ML
500 SOLUTION INTRAVENOUS PRN
Status: CANCELLED | OUTPATIENT
Start: 2022-02-18

## 2022-01-07 RX ORDER — SODIUM CHLORIDE 0.9 % (FLUSH) 0.9 %
10 SYRINGE (ML) INJECTION PRN
Status: DISCONTINUED | OUTPATIENT
Start: 2022-01-07 | End: 2022-01-08 | Stop reason: HOSPADM

## 2022-01-07 RX ORDER — METHYLPREDNISOLONE SODIUM SUCCINATE 125 MG/2ML
125 INJECTION, POWDER, LYOPHILIZED, FOR SOLUTION INTRAMUSCULAR; INTRAVENOUS ONCE
Status: CANCELLED | OUTPATIENT
Start: 2022-02-25 | End: 2022-02-18

## 2022-01-07 RX ORDER — METHYLPREDNISOLONE SODIUM SUCCINATE 125 MG/2ML
125 INJECTION, POWDER, LYOPHILIZED, FOR SOLUTION INTRAMUSCULAR; INTRAVENOUS ONCE
Status: CANCELLED | OUTPATIENT
Start: 2022-02-18 | End: 2022-02-11

## 2022-01-07 RX ORDER — HEPARIN SODIUM (PORCINE) LOCK FLUSH IV SOLN 100 UNIT/ML 100 UNIT/ML
500 SOLUTION INTRAVENOUS PRN
Status: DISCONTINUED | OUTPATIENT
Start: 2022-01-07 | End: 2022-01-08 | Stop reason: HOSPADM

## 2022-01-07 RX ORDER — HEPARIN SODIUM (PORCINE) LOCK FLUSH IV SOLN 100 UNIT/ML 100 UNIT/ML
500 SOLUTION INTRAVENOUS PRN
Status: CANCELLED | OUTPATIENT
Start: 2022-02-25

## 2022-01-07 RX ORDER — DIPHENHYDRAMINE HYDROCHLORIDE 50 MG/ML
50 INJECTION INTRAMUSCULAR; INTRAVENOUS ONCE
Status: CANCELLED | OUTPATIENT
Start: 2022-02-18 | End: 2022-02-11

## 2022-01-07 RX ADMIN — GEMCITABINE 1800 MG: 38 INJECTION, SOLUTION INTRAVENOUS at 15:19

## 2022-01-07 RX ADMIN — SODIUM CHLORIDE 20 ML/HR: 9 INJECTION, SOLUTION INTRAVENOUS at 15:05

## 2022-01-07 RX ADMIN — DEXAMETHASONE SODIUM PHOSPHATE 8 MG: 10 INJECTION INTRAMUSCULAR; INTRAVENOUS at 15:06

## 2022-01-07 NOTE — PROGRESS NOTES
Pt here for C12D8 Gemzar. Denies any new complaints. Labs drawn from port and results reviewed. Pt was treated without incident and d/c'd in stable condition. Pt will return on 1-21-22 for MD corrales/stefany and C13D1.

## 2022-01-07 NOTE — PATIENT INSTRUCTIONS
Ativan Refill   at the start of every cycle  Proceed with chemo  RV February  Refer to Psychiatry for depression

## 2022-01-09 NOTE — PROGRESS NOTES
_      Chief Complaint   Patient presents with    Other     review of disease     DIAGNOSIS:       Recurrent ovarian cancer. Original diagnosis 2005 with multiple recurrences. Diffuse metastasis. CURRENT THERAPY:         Doxil/ Avastin started 9/18/2020. Avastin was discontinued due to recent GI bleeding. Status post recent vascular embolization  S/p seizure disorder. Gemzar started 2/26/2021. Interrupted due to hospitalization and problem with compliance. BRIEF CASE HISTORY:      Ms. Opal Kang is a very pleasant 60 y.o. female with history of multiple co morbidities as listed. The patient seen in consultation for ovarian cancer with multiple recurrences. 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 She had surgical debulking and she had systemic treatment with Taxol and carboplatin for 6 cycles. Patient was in remission for about 2 years. She had a relapse in 2007 and treated with topotecan with good results. Patient relapsed again in 2008 and was treated with Taxol carboplatin. After 3 cycles of systemic chemotherapy she had problems with carboplatin so she finished 3 more cycles with Taxol. She did well again for 2 to 3 years until she had a relapse in 2012 and she had intraperitoneal chemotherapy treatment with Taxol. Patient was last seen by her oncologist in 2014 at which time she had relatively stable disease with normal tumor marker and no significant abnormal images. Patient moved to [place] after that and she was lost for oncology follow-ups. Patient was recently evaluated again here in [facility] because of abdominal discomfort. Re imaging confirmed metastatic relapse. Biopsy confirmed ovarian cancer recurrence. Scan showed extensive intraabdominal and splenic involvement and lung mets. She has no symptoms at the present time.    Patient denies smoking or alcohol drinking.l    INTERIM HISTORY:    patient is seen for follow up recurrent ovarian cancer. Started on Gemzar. Tolerated well. No melena or hematochezia. No hematemesis. Currently stable. No recent seizure activities. Labs are stable as well. No severe anemia. No active bleeding. Chemotherapy is tolerated fairly well. Patient c/o depression. No suicidal ideation. PAST MEDICAL HISTORY: has a past medical history of Anemia, Bleeding, Cervical cancer ([facility] [address] Depression, Diabetes mellitus ([facility] [address] GERD (gastroesophageal reflux disease), Hx of blood clots, Hypertension, Metastatic cancer ([facility] [address] Ovarian cancer ([facility] [address] Post chemo evaluation, and Splenic lesion. PAST SURGICAL HISTORY: has a past surgical history that includes Hysterectomy, total abdominal; Port Surgery; Tonsillectomy; IR PORT PLACEMENT > 5 YEARS (08/24/2020); Anus surgery; Abscess Drainage (2013); colectomy (03/2013); IR EMBOLIZATION HEMORRHAGE (10/05/2020); and Cardiac catheterization. CURRENT MEDICATIONS:  has a current medication list which includes the following prescription(s): lorazepam, pantoprazole, morphine, morphine, oxycodone-acetaminophen, ondansetron, naloxegol, ferrous sulfate, morphine, lamotrigine, sertraline, levetiracetam, and eliquis. ALLERGIES:  is allergic to ceftriaxone. FAMILY HISTORY: Negative for any hematological or oncological conditions. SOCIAL HISTORY:  reports that she has been smoking cigarettes. She has been smoking about 1.00 pack per day. She uses smokeless tobacco. She reports previous alcohol use. She reports that she does not use drugs. REVIEW OF SYSTEMS:     · General: No weakness or fatigue. No unanticipated weight loss or decreased appetite. No fever or chills. · Eyes: No blurred vision, eye pain or double vision. · Ears: No hearing problems or drainage. No tinnitus. · Throat: No sore throat, problems with swallowing or dysphagia.    · Respiratory: No cough, sputum or hemoptysis. No shortness of breath. No pleuritic chest pain. · Cardiovascular: No chest pain, orthopnea or PND. No lower extremity edema. No palpitation. · Gastrointestinal: As above. · Genitourinary: No dysuria, hematuria, frequency or urgency. · Musculoskeletal: No muscle aches or pains. No limitation of movement. No back pain. No gait disturbance, No joint complaints. · Dermatologic: No skin rashes or pruritus. No skin lesions or discolorations. · Psychiatric: No depression, anxiety, or stress or signs of schizophrenia. No change in mood or affect. · Hematologic: No history of bleeding tendency. No bruises or ecchymosis. No history of clotting problems. · Infectious disease: No fever, chills or frequent infections. · Endocrine: No polydipsia or polyuria. No temperature intolerance. · Neurologic: No headaches or dizziness. No weakness or numbness of the extremities. No changes in balance, coordination,  memory, mentation, behavior. · Allergic/Immunologic: No nasal congestion or hives. No repeated infections. PHYSICAL EXAM:  The patient is not in acute distress. Vital signs: Blood pressure (!) 152/73, pulse 96, temperature 98.7 °F (37.1 °C), temperature source Oral, resp. rate 18, weight 161 lb (73 kg).      General appearance - well appearing, not in pain or distress  Mental status - good mood, alert and oriented  Eyes - pupils equal and reactive, extraocular eye movements intact  Ears - bilateral TM's and external ear canals normal  Nose - normal and patent, no erythema, discharge or polyps  Mouth - mucous membranes moist, pharynx normal without lesions  Neck - supple, no significant adenopathy  Lymphatics - no palpable lymphadenopathy, no hepatosplenomegaly  Chest - clear to auscultation, no wheezes, rales or rhonchi, symmetric air entry  Heart - normal rate, regular rhythm, normal S1, S2, no murmurs, rubs, clicks or gallops  Abdomen - soft, nontender, nondistended, no masses or organomegaly  Neurological - alert, oriented, normal speech, no focal findings or movement disorder noted  Musculoskeletal - no joint tenderness, deformity or swelling  Extremities - peripheral pulses normal, no pedal edema, no clubbing or cyanosis  Skin - normal coloration and turgor, no rashes, no suspicious skin lesions noted     Review of Diagnostic data:   Lab Results   Component Value Date    WBC 8.3 01/07/2022    HGB 10.6 (L) 01/07/2022    HCT 33.2 (L) 01/07/2022    MCV 93.0 01/07/2022     (H) 01/07/2022       Chemistry        Component Value Date/Time     01/07/2022 1408    K 4.0 01/07/2022 1408     01/07/2022 1408    CO2 24 01/07/2022 1408    BUN 10 01/07/2022 1408    CREATININE 0.48 (L) 01/07/2022 1408        Component Value Date/Time    CALCIUM 8.7 01/07/2022 1408    ALKPHOS 67 01/07/2022 1408    AST 12 01/07/2022 1408    ALT 7 01/07/2022 1408    BILITOT 0.10 (L) 01/07/2022 1408          Lab Results   Component Value Date     333 (H) 12/27/2021         IMPRESSION:   Recurrent ovarian cancer. Original diagnosis 2005 with multiple recurrences. Diffuse metastasis. Recent active intra-abdominal bleeding due to splenic mass with GI infiltration. Status post embolization  S/p seizure disorder. PLAN:   Discussed palliative treatment. Different regimens were discussed. We prescribed Doxil as single agent but insurance declined. Patient was started on Doxil and Avastin. She tolerated treatment fairly well. However due to recent episode of intra-abdominal bleeding we will discontinue Avastin. Next treatment will be with the use of Doxil. Patient had embolization by vascular. s/p hospitalization for seizure. No brain mets. Seizures controlled. Hospitalized last week with left leg DVT and anemia. Hemoglobin is now stable. She received blood transfusion. No active bleeding. She is currently on Eliquis for DVT.   Tolerated well with no side effects. S/p hospitalization for severe anemia. S/p blood transfusion. Currently patient is relatively stable and tolerating chemotherapy fairly well. Repeated tumor marker today showed stable marker. I will continue on treatment until progression or intolerable side effects. Will refer to psychiatry for management of depression. Patient's questions were answered to the best of her satisfaction and she verbalized full understanding and agreement.

## 2022-01-10 ENCOUNTER — TELEPHONE (OUTPATIENT)
Dept: ONCOLOGY | Age: 57
End: 2022-01-10

## 2022-01-10 NOTE — TELEPHONE ENCOUNTER
AVS from 1/7/22    Ativan Refill   at the start of every cycle  Proceed with chemo  RV February  Refer to Psychiatry for depression    Pt to  rx at pharmacy on file    Labs confirmed for next cycle    Tx today as scheduled    RV scheduled 2/11/22    Dr Jorge Luis Espinosa office to contact patient for referral    PT was given AVS and an appt schedule    Electronically signed by Mor Dustin on 1/10/2022 at 8:30 AM

## 2022-01-14 DIAGNOSIS — C56.9 MALIGNANT NEOPLASM OF OVARY, UNSPECIFIED LATERALITY (HCC): Primary | ICD-10-CM

## 2022-01-14 RX ORDER — AZITHROMYCIN 250 MG/1
250 TABLET, FILM COATED ORAL SEE ADMIN INSTRUCTIONS
Qty: 6 TABLET | Refills: 0 | Status: SHIPPED | OUTPATIENT
Start: 2022-01-14 | End: 2022-01-19

## 2022-01-14 NOTE — TELEPHONE ENCOUNTER
Pt states she has an upper respiratory infection. She states she has a slight/dry cough, congestion, and green/yellow sputum. She denies any fever. Discussed with Dr. Fermin Villasenor and he states ok to send Shauna Mitchell. RX sent and pt notified.

## 2022-01-21 ENCOUNTER — HOSPITAL ENCOUNTER (OUTPATIENT)
Dept: INFUSION THERAPY | Age: 57
Discharge: HOME | End: 2022-01-21
Payer: COMMERCIAL

## 2022-01-21 VITALS
TEMPERATURE: 98 F | SYSTOLIC BLOOD PRESSURE: 147 MMHG | RESPIRATION RATE: 18 BRPM | WEIGHT: 162.4 LBS | HEART RATE: 96 BPM | BODY MASS INDEX: 27.02 KG/M2 | DIASTOLIC BLOOD PRESSURE: 75 MMHG

## 2022-01-21 DIAGNOSIS — C79.51 CANCER, METASTATIC TO BONE (HCC): ICD-10-CM

## 2022-01-21 DIAGNOSIS — Z85.43 HISTORY OF OVARIAN CANCER: ICD-10-CM

## 2022-01-21 DIAGNOSIS — C56.9 MALIGNANT NEOPLASM OF OVARY, UNSPECIFIED LATERALITY (HCC): Primary | ICD-10-CM

## 2022-01-21 LAB
ABSOLUTE EOS #: 0.5 K/UL (ref 0–0.4)
ABSOLUTE IMMATURE GRANULOCYTE: ABNORMAL K/UL (ref 0–0.3)
ABSOLUTE LYMPH #: 1.4 K/UL (ref 1–4.8)
ABSOLUTE MONO #: 0.9 K/UL (ref 0.1–1.2)
ALBUMIN SERPL-MCNC: 3.5 G/DL (ref 3.5–5.2)
ALBUMIN/GLOBULIN RATIO: 1.5 (ref 1–2.5)
ALP BLD-CCNC: 62 U/L (ref 35–104)
ALT SERPL-CCNC: <5 U/L (ref 5–33)
ANION GAP SERPL CALCULATED.3IONS-SCNC: 7 MMOL/L (ref 9–17)
AST SERPL-CCNC: 12 U/L
BASOPHILS # BLD: 1 % (ref 0–2)
BASOPHILS ABSOLUTE: 0.1 K/UL (ref 0–0.2)
BILIRUB SERPL-MCNC: <0.1 MG/DL (ref 0.3–1.2)
BUN BLDV-MCNC: 7 MG/DL (ref 6–20)
BUN/CREAT BLD: ABNORMAL (ref 9–20)
CA 125: 291 U/ML
CALCIUM SERPL-MCNC: 8 MG/DL (ref 8.6–10.4)
CHLORIDE BLD-SCNC: 107 MMOL/L (ref 98–107)
CO2: 24 MMOL/L (ref 20–31)
CREAT SERPL-MCNC: 0.43 MG/DL (ref 0.5–0.9)
DIFFERENTIAL TYPE: ABNORMAL
EOSINOPHILS RELATIVE PERCENT: 6 % (ref 1–4)
GFR AFRICAN AMERICAN: >60 ML/MIN
GFR NON-AFRICAN AMERICAN: >60 ML/MIN
GFR SERPL CREATININE-BSD FRML MDRD: ABNORMAL ML/MIN/{1.73_M2}
GFR SERPL CREATININE-BSD FRML MDRD: ABNORMAL ML/MIN/{1.73_M2}
GLUCOSE BLD-MCNC: 114 MG/DL (ref 70–99)
HCT VFR BLD CALC: 32.5 % (ref 36–46)
HEMOGLOBIN: 10.4 G/DL (ref 12–16)
IMMATURE GRANULOCYTES: ABNORMAL %
LYMPHOCYTES # BLD: 16 % (ref 24–44)
MCH RBC QN AUTO: 29.4 PG (ref 26–34)
MCHC RBC AUTO-ENTMCNC: 32 G/DL (ref 31–37)
MCV RBC AUTO: 91.7 FL (ref 80–100)
MONOCYTES # BLD: 9 % (ref 2–11)
NRBC AUTOMATED: ABNORMAL PER 100 WBC
PDW BLD-RTO: 18.1 % (ref 12.5–15.4)
PLATELET # BLD: 847 K/UL (ref 140–450)
PLATELET ESTIMATE: ABNORMAL
PMV BLD AUTO: 7 FL (ref 6–12)
POTASSIUM SERPL-SCNC: 3.5 MMOL/L (ref 3.7–5.3)
RBC # BLD: 3.54 M/UL (ref 4–5.2)
RBC # BLD: ABNORMAL 10*6/UL
SEG NEUTROPHILS: 68 % (ref 36–66)
SEGMENTED NEUTROPHILS ABSOLUTE COUNT: 6.2 K/UL (ref 1.8–7.7)
SODIUM BLD-SCNC: 138 MMOL/L (ref 135–144)
TOTAL PROTEIN: 5.9 G/DL (ref 6.4–8.3)
WBC # BLD: 9.1 K/UL (ref 3.5–11)
WBC # BLD: ABNORMAL 10*3/UL

## 2022-01-21 PROCEDURE — 80053 COMPREHEN METABOLIC PANEL: CPT

## 2022-01-21 PROCEDURE — 85025 COMPLETE CBC W/AUTO DIFF WBC: CPT

## 2022-01-21 PROCEDURE — 6360000002 HC RX W HCPCS: Performed by: INTERNAL MEDICINE

## 2022-01-21 PROCEDURE — 6370000000 HC RX 637 (ALT 250 FOR IP): Performed by: INTERNAL MEDICINE

## 2022-01-21 PROCEDURE — 96413 CHEMO IV INFUSION 1 HR: CPT

## 2022-01-21 PROCEDURE — 36591 DRAW BLOOD OFF VENOUS DEVICE: CPT

## 2022-01-21 PROCEDURE — 96375 TX/PRO/DX INJ NEW DRUG ADDON: CPT

## 2022-01-21 PROCEDURE — 2580000003 HC RX 258: Performed by: INTERNAL MEDICINE

## 2022-01-21 PROCEDURE — 86304 IMMUNOASSAY TUMOR CA 125: CPT

## 2022-01-21 RX ORDER — SODIUM CHLORIDE 0.9 % (FLUSH) 0.9 %
10 SYRINGE (ML) INJECTION PRN
Status: DISCONTINUED | OUTPATIENT
Start: 2022-01-21 | End: 2022-01-22 | Stop reason: HOSPADM

## 2022-01-21 RX ORDER — HEPARIN SODIUM (PORCINE) LOCK FLUSH IV SOLN 100 UNIT/ML 100 UNIT/ML
500 SOLUTION INTRAVENOUS PRN
Status: DISCONTINUED | OUTPATIENT
Start: 2022-01-21 | End: 2022-01-22 | Stop reason: HOSPADM

## 2022-01-21 RX ORDER — SODIUM CHLORIDE 9 MG/ML
20 INJECTION, SOLUTION INTRAVENOUS ONCE
Status: DISCONTINUED | OUTPATIENT
Start: 2022-01-21 | End: 2022-01-22 | Stop reason: HOSPADM

## 2022-01-21 RX ORDER — POTASSIUM CHLORIDE 20 MEQ/1
40 TABLET, EXTENDED RELEASE ORAL ONCE
Status: COMPLETED | OUTPATIENT
Start: 2022-01-21 | End: 2022-01-21

## 2022-01-21 RX ORDER — DEXAMETHASONE SODIUM PHOSPHATE 4 MG/ML
8 INJECTION, SOLUTION INTRA-ARTICULAR; INTRALESIONAL; INTRAMUSCULAR; INTRAVENOUS; SOFT TISSUE ONCE
Status: COMPLETED | OUTPATIENT
Start: 2022-01-21 | End: 2022-01-21

## 2022-01-21 RX ADMIN — HEPARIN 500 UNITS: 100 SYRINGE at 12:26

## 2022-01-21 RX ADMIN — SODIUM CHLORIDE, PRESERVATIVE FREE 10 ML: 5 INJECTION INTRAVENOUS at 12:26

## 2022-01-21 RX ADMIN — DEXAMETHASONE SODIUM PHOSPHATE 8 MG: 4 INJECTION, SOLUTION INTRAMUSCULAR; INTRAVENOUS at 11:09

## 2022-01-21 RX ADMIN — SODIUM CHLORIDE 20 ML/HR: 9 INJECTION, SOLUTION INTRAVENOUS at 11:08

## 2022-01-21 RX ADMIN — GEMCITABINE 1800 MG: 38 INJECTION, SOLUTION INTRAVENOUS at 11:42

## 2022-01-21 RX ADMIN — SODIUM CHLORIDE, PRESERVATIVE FREE 10 ML: 5 INJECTION INTRAVENOUS at 10:34

## 2022-01-21 RX ADMIN — POTASSIUM CHLORIDE 40 MEQ: 1500 TABLET, EXTENDED RELEASE ORAL at 11:09

## 2022-01-21 ASSESSMENT — PAIN DESCRIPTION - LOCATION: LOCATION: ABDOMEN;BACK

## 2022-01-21 ASSESSMENT — PAIN SCALES - GENERAL: PAINLEVEL_OUTOF10: 7

## 2022-01-21 NOTE — PROGRESS NOTES
Patient here for C13D1 Gemzar. Labs drawn from port and reviewed. K+ 3.5 replaced per sliding scale in pharmacy. Patient tearful but denies any issues at this time. She tolerated treatment well and was discharged home in stable condition. She is due to return 1/28 for C13D8 Gemzar.

## 2022-01-27 DIAGNOSIS — C79.51 CANCER, METASTATIC TO BONE (HCC): ICD-10-CM

## 2022-01-27 DIAGNOSIS — M54.59 INTRACTABLE LOW BACK PAIN: ICD-10-CM

## 2022-01-27 RX ORDER — OXYCODONE HYDROCHLORIDE AND ACETAMINOPHEN 5; 325 MG/1; MG/1
TABLET ORAL
Qty: 180 TABLET | Refills: 0 | Status: ON HOLD | OUTPATIENT
Start: 2022-01-27 | End: 2022-02-28

## 2022-01-27 RX ORDER — MORPHINE SULFATE 30 MG/1
30 TABLET, FILM COATED, EXTENDED RELEASE ORAL 2 TIMES DAILY
Qty: 60 TABLET | Refills: 0 | Status: ON HOLD
Start: 2022-01-27 | End: 2022-02-28 | Stop reason: HOSPADM

## 2022-01-27 RX ORDER — MORPHINE SULFATE 15 MG/1
15 TABLET, FILM COATED, EXTENDED RELEASE ORAL 2 TIMES DAILY
Qty: 60 TABLET | Refills: 0 | Status: ON HOLD
Start: 2022-01-27 | End: 2022-02-28 | Stop reason: HOSPADM

## 2022-01-28 ENCOUNTER — HOSPITAL ENCOUNTER (OUTPATIENT)
Dept: INFUSION THERAPY | Age: 57
Discharge: HOME | End: 2022-01-28
Payer: COMMERCIAL

## 2022-01-28 VITALS
TEMPERATURE: 98.3 F | HEART RATE: 79 BPM | SYSTOLIC BLOOD PRESSURE: 151 MMHG | DIASTOLIC BLOOD PRESSURE: 92 MMHG | RESPIRATION RATE: 18 BRPM | BODY MASS INDEX: 27.46 KG/M2 | WEIGHT: 165 LBS

## 2022-01-28 DIAGNOSIS — Z85.43 HISTORY OF OVARIAN CANCER: ICD-10-CM

## 2022-01-28 DIAGNOSIS — C56.9 MALIGNANT NEOPLASM OF OVARY, UNSPECIFIED LATERALITY (HCC): Primary | ICD-10-CM

## 2022-01-28 DIAGNOSIS — C79.51 CANCER, METASTATIC TO BONE (HCC): ICD-10-CM

## 2022-01-28 LAB
ABSOLUTE EOS #: 0.07 K/UL (ref 0–0.4)
ABSOLUTE IMMATURE GRANULOCYTE: ABNORMAL K/UL (ref 0–0.3)
ABSOLUTE LYMPH #: 1.26 K/UL (ref 1–4.8)
ABSOLUTE MONO #: 0.44 K/UL (ref 0.1–0.8)
ALBUMIN SERPL-MCNC: 3.8 G/DL (ref 3.5–5.2)
ALBUMIN/GLOBULIN RATIO: 1.5 (ref 1–2.5)
ALP BLD-CCNC: 69 U/L (ref 35–104)
ALT SERPL-CCNC: 9 U/L (ref 5–33)
ANION GAP SERPL CALCULATED.3IONS-SCNC: 11 MMOL/L (ref 9–17)
AST SERPL-CCNC: 17 U/L
BASOPHILS # BLD: 1 % (ref 0–2)
BASOPHILS ABSOLUTE: 0.03 K/UL (ref 0–0.2)
BILIRUB SERPL-MCNC: 0.12 MG/DL (ref 0.3–1.2)
BUN BLDV-MCNC: 7 MG/DL (ref 6–20)
BUN/CREAT BLD: ABNORMAL (ref 9–20)
CALCIUM SERPL-MCNC: 8.8 MG/DL (ref 8.6–10.4)
CHLORIDE BLD-SCNC: 104 MMOL/L (ref 98–107)
CO2: 24 MMOL/L (ref 20–31)
CREAT SERPL-MCNC: <0.4 MG/DL (ref 0.5–0.9)
DIFFERENTIAL TYPE: ABNORMAL
EOSINOPHILS RELATIVE PERCENT: 2 % (ref 1–4)
GFR AFRICAN AMERICAN: ABNORMAL ML/MIN
GFR NON-AFRICAN AMERICAN: ABNORMAL ML/MIN
GFR SERPL CREATININE-BSD FRML MDRD: ABNORMAL ML/MIN/{1.73_M2}
GFR SERPL CREATININE-BSD FRML MDRD: ABNORMAL ML/MIN/{1.73_M2}
GLUCOSE BLD-MCNC: 100 MG/DL (ref 70–99)
HCT VFR BLD CALC: 33 % (ref 36–46)
HEMOGLOBIN: 10.8 G/DL (ref 12–16)
IMMATURE GRANULOCYTES: ABNORMAL %
LYMPHOCYTES # BLD: 37 % (ref 24–44)
MCH RBC QN AUTO: 29.5 PG (ref 26–34)
MCHC RBC AUTO-ENTMCNC: 32.6 G/DL (ref 31–37)
MCV RBC AUTO: 90.7 FL (ref 80–100)
MONOCYTES # BLD: 13 % (ref 1–7)
MORPHOLOGY: ABNORMAL
NRBC AUTOMATED: ABNORMAL PER 100 WBC
PDW BLD-RTO: 17.6 % (ref 12.5–15.4)
PLATELET # BLD: 650 K/UL (ref 140–450)
PLATELET ESTIMATE: ABNORMAL
PMV BLD AUTO: 6.4 FL (ref 6–12)
POTASSIUM SERPL-SCNC: 3.9 MMOL/L (ref 3.7–5.3)
RBC # BLD: 3.64 M/UL (ref 4–5.2)
RBC # BLD: ABNORMAL 10*6/UL
SEG NEUTROPHILS: 47 % (ref 36–66)
SEGMENTED NEUTROPHILS ABSOLUTE COUNT: 1.6 K/UL (ref 1.8–7.7)
SODIUM BLD-SCNC: 139 MMOL/L (ref 135–144)
TOTAL PROTEIN: 6.3 G/DL (ref 6.4–8.3)
WBC # BLD: 3.4 K/UL (ref 3.5–11)
WBC # BLD: ABNORMAL 10*3/UL

## 2022-01-28 PROCEDURE — 2580000003 HC RX 258: Performed by: INTERNAL MEDICINE

## 2022-01-28 PROCEDURE — 96413 CHEMO IV INFUSION 1 HR: CPT

## 2022-01-28 PROCEDURE — 36591 DRAW BLOOD OFF VENOUS DEVICE: CPT

## 2022-01-28 PROCEDURE — 85025 COMPLETE CBC W/AUTO DIFF WBC: CPT

## 2022-01-28 PROCEDURE — 96375 TX/PRO/DX INJ NEW DRUG ADDON: CPT

## 2022-01-28 PROCEDURE — 6360000002 HC RX W HCPCS: Performed by: INTERNAL MEDICINE

## 2022-01-28 PROCEDURE — 80053 COMPREHEN METABOLIC PANEL: CPT

## 2022-01-28 RX ORDER — SODIUM CHLORIDE 0.9 % (FLUSH) 0.9 %
10 SYRINGE (ML) INJECTION PRN
Status: DISCONTINUED | OUTPATIENT
Start: 2022-01-28 | End: 2022-01-29 | Stop reason: HOSPADM

## 2022-01-28 RX ORDER — SODIUM CHLORIDE 9 MG/ML
20 INJECTION, SOLUTION INTRAVENOUS ONCE
Status: COMPLETED | OUTPATIENT
Start: 2022-01-28 | End: 2022-01-28

## 2022-01-28 RX ORDER — HEPARIN SODIUM (PORCINE) LOCK FLUSH IV SOLN 100 UNIT/ML 100 UNIT/ML
500 SOLUTION INTRAVENOUS PRN
Status: DISCONTINUED | OUTPATIENT
Start: 2022-01-28 | End: 2022-01-29 | Stop reason: HOSPADM

## 2022-01-28 RX ORDER — DEXAMETHASONE SODIUM PHOSPHATE 4 MG/ML
8 INJECTION, SOLUTION INTRA-ARTICULAR; INTRALESIONAL; INTRAMUSCULAR; INTRAVENOUS; SOFT TISSUE ONCE
Status: COMPLETED | OUTPATIENT
Start: 2022-01-28 | End: 2022-01-28

## 2022-01-28 RX ADMIN — SODIUM CHLORIDE, PRESERVATIVE FREE 10 ML: 5 INJECTION INTRAVENOUS at 15:16

## 2022-01-28 RX ADMIN — SODIUM CHLORIDE 20 ML/HR: 9 INJECTION, SOLUTION INTRAVENOUS at 14:27

## 2022-01-28 RX ADMIN — HEPARIN 500 UNITS: 100 SYRINGE at 15:16

## 2022-01-28 RX ADMIN — SODIUM CHLORIDE, PRESERVATIVE FREE 10 ML: 5 INJECTION INTRAVENOUS at 13:19

## 2022-01-28 RX ADMIN — DEXAMETHASONE SODIUM PHOSPHATE 8 MG: 4 INJECTION, SOLUTION INTRAMUSCULAR; INTRAVENOUS at 14:26

## 2022-01-28 RX ADMIN — GEMCITABINE 1800 MG: 38 INJECTION, SOLUTION INTRAVENOUS at 14:37

## 2022-01-28 ASSESSMENT — PAIN DESCRIPTION - LOCATION: LOCATION: ABDOMEN;BACK

## 2022-01-28 ASSESSMENT — PAIN SCALES - GENERAL: PAINLEVEL_OUTOF10: 7

## 2022-01-28 NOTE — PROGRESS NOTES
Patient here for C13D8 Gemzar. Labs drawn from port and reviewed. Denies any new issues at this time. She tolerated treatment well and was discharged home in stable condition. She is due to return 2/11 for C14D1 and MD visit.

## 2022-01-28 NOTE — FLOWSHEET NOTE
SPIRITUAL CARE PROGRESS NOTE: Outpatient Oncology at [facility]    Spiritual Assessment: Ms. Robert Metcalf was in the treatment cubicle of the infusion clinic. She smiled and greeted writer. She reported how she was doing. She acknowledged the support of her son who is also going through treatment. She expressed gratitude that writer stopped and asked how she was doing. Intervention: Writer provided supportive presence and explored Pt's coping and needs; offered words of encouragement. Outcome: Patient thanked Meagan Gutierres. Plan: Chaplains will remain available to provide emotional and spiritual support as needed. 01/28/22 1445   Encounter Summary   Services provided to: Patient   Referral/Consult From: [facility];Family members   Continue Visiting   (1/28/22)   Complexity of Encounter Low   Length of Encounter 15 minutes   Routine   Type Follow up   Assessment Calm; Approachable   Intervention Active listening;Explored feelings, thoughts, concerns;Explored coping resources;Sustaining presence/ Ministry of presence   Outcome Expressed gratitude;Coping     Electronically signed by Remigio Taylor, Oncology Outpatient [address], [address] [facility] Radiation Oncology  1/28/2022  2:46 PM

## 2022-02-01 ENCOUNTER — TELEPHONE (OUTPATIENT)
Dept: ONCOLOGY | Age: 57
End: 2022-02-01

## 2022-02-01 DIAGNOSIS — F32.A DEPRESSION, UNSPECIFIED DEPRESSION TYPE: ICD-10-CM

## 2022-02-01 DIAGNOSIS — C56.9 MALIGNANT NEOPLASM OF OVARY, UNSPECIFIED LATERALITY (HCC): Primary | ICD-10-CM

## 2022-02-01 NOTE — TELEPHONE ENCOUNTER
Name: Nya Hull [facility]  : 1965  MRN: [id number]    Oncology Navigation Follow-Up Note    Contact Type:  Telephone  Notes: Spoke with pt for f/u call. Pt denied questions/concerns. Inquired on referral to Dr. Lani Rodriguez, psychiatry. Pt stated has not been contacted by Dr. Inna santos. Instructed pt will contact Dr. Inna santos to confirm referral received. Spoke with Ignacia Guardado., [facility] oncology navigation, updated on pt & requested contact Dr. Inna santos. Will continue to follow.     Electronically signed by Leda Ramesh RN on 2022 at 2:12 PM

## 2022-02-01 NOTE — TELEPHONE ENCOUNTER
Luis Felipe Putnam. I called Dr. Trinidad Parents office and spoke with Maryjane Durbin. Simona states that they did not receive referral. She asked me patient's insurance information and they are out of network with [facility]. I called patient to notify her that Dr. Avtar Bauer is out of network and to contact [facility] for a list of providers that are in network. I asked that patient call Uma Harden or myself for assistance, questions, or concerns. I left both of our phone numbers. Rianna updated.

## 2022-02-02 NOTE — TELEPHONE ENCOUNTER
Patient called back after hours and left voice mail stating that she found 8 providers in network with her insurance that per the insurance had virtual visit options which is what she prefers. I called patient back this morning and got her voice mail. I left message for patient to call me back with the information or she can email me. I left the number for the navigation office as well as my [place] email address.

## 2022-02-02 NOTE — TELEPHONE ENCOUNTER
Patient sent me a list of in network providers. I have scanned them in and attached to this encounter.

## 2022-02-07 NOTE — TELEPHONE ENCOUNTER
I spoke with Dr. April Ac and he is ok with referral to any provider on patient's insurance. I called and spoke to patient, she wants to try sending the referral to the office in [place] first. [facility]. REferral faxed to Leti Gupta CNP.

## 2022-02-09 ENCOUNTER — OFFICE VISIT (OUTPATIENT)
Dept: NEUROLOGY | Age: 57
End: 2022-02-09
Payer: COMMERCIAL

## 2022-02-09 VITALS
RESPIRATION RATE: 16 BRPM | HEART RATE: 84 BPM | DIASTOLIC BLOOD PRESSURE: 72 MMHG | TEMPERATURE: 97.6 F | SYSTOLIC BLOOD PRESSURE: 129 MMHG

## 2022-02-09 DIAGNOSIS — R56.9 SEIZURE (HCC): Primary | ICD-10-CM

## 2022-02-09 PROCEDURE — 4004F PT TOBACCO SCREEN RCVD TLK: CPT | Performed by: PSYCHIATRY & NEUROLOGY

## 2022-02-09 PROCEDURE — G8484 FLU IMMUNIZE NO ADMIN: HCPCS | Performed by: PSYCHIATRY & NEUROLOGY

## 2022-02-09 PROCEDURE — G8417 CALC BMI ABV UP PARAM F/U: HCPCS | Performed by: PSYCHIATRY & NEUROLOGY

## 2022-02-09 PROCEDURE — 3017F COLORECTAL CA SCREEN DOC REV: CPT | Performed by: PSYCHIATRY & NEUROLOGY

## 2022-02-09 PROCEDURE — 99213 OFFICE O/P EST LOW 20 MIN: CPT | Performed by: PSYCHIATRY & NEUROLOGY

## 2022-02-09 PROCEDURE — G8427 DOCREV CUR MEDS BY ELIG CLIN: HCPCS | Performed by: PSYCHIATRY & NEUROLOGY

## 2022-02-09 RX ORDER — LEVETIRACETAM 750 MG/1
1500 TABLET ORAL 2 TIMES DAILY
Qty: 240 TABLET | Refills: 2 | Status: ON HOLD
Start: 2022-02-09 | End: 2022-02-28 | Stop reason: HOSPADM

## 2022-02-09 RX ORDER — LAMOTRIGINE 25 MG/1
TABLET ORAL
Qty: 180 TABLET | Refills: 2 | Status: ON HOLD
Start: 2022-02-09 | End: 2022-02-28 | Stop reason: HOSPADM

## 2022-02-09 ASSESSMENT — ENCOUNTER SYMPTOMS
ABDOMINAL DISTENTION: 0
APNEA: 0
CHEST TIGHTNESS: 0
BACK PAIN: 0
PHOTOPHOBIA: 0
ABDOMINAL PAIN: 0
CHOKING: 0
COUGH: 0
EYE PAIN: 0
EYE REDNESS: 0

## 2022-02-09 NOTE — PROGRESS NOTES
[place] [address] [id number]  Dept: [phone]  Dept Fax: [phone]    NEUROLOGY NEW PATIENT NOTE       PATIENT NAME: Chato Mott [facility]  PATIENT MRN: [id number]  PRIMARY CARE PHYSICIAN: Lelo Uriostegui MD      HPI:      Stew Noguera is a 60 y.o. 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 The patient does have history of seizures and she is on Keppra. Per medical chart, the patient was confused, EMS was called and in route the patient had seizure-like activity. In the ED, the patient had another 2 generalized tonic-clonic seizures that was aborted by 2 mg of Ativan and was loaded with 1 g of Keppra. The patient underwent stat MRI of the brain which was unremarkable. CTA was unremarkable. Keppra was increased to 1500 mg twice daily along with starting lamotrigine 25 mg daily. LTME in [address] in 10/2020. Interval history  The patient denied any further seizures after June admission. The patient is complaining of severe anxiety, will refer her to psychiatrist.  We will continue 1500 mg of Keppra twice daily. The patient is on 25 mg daily of Lamictal, will increase the dose incrementally to 100 mg daily. Interval hx  The Patient was seen and examined at bedside  Is vitally stable alert oriented x3  The patient stated that no further seizures since July. On keppra and Lamictal with no SE. PREVIOUS WORKUP:     Lab Results   Component Value Date    WBC 3.4 (L) 01/28/2022    HGB 10.8 (L) 01/28/2022    HCT 33.0 (L) 01/28/2022    MCV 90.7 01/28/2022     (H) 01/28/2022       Past Medical History:   Diagnosis Date    Anemia     Bleeding 10/2020    intra-abdominal bleeding -due to splenic mass with GI infiltration.   Status post embolization    [facility]ical [facility] ([facility] [address]     Depression     Diabetes mellitus ([facility] [facility]     GERD (gastroesophageal reflux disease)     Hx of blood clots     Hypertension     Metastatic cancer ([facility] [address] 10/2020    extensive intraabdominal and splenic involvement and lung mets.  [facility]     low grade serous ovarian carcinoma    Post chemo evaluation     2007: Chemo via med onc (Dr. Corey Harrington), 2008: Ani Oakley due to rising CA-125, 2013: intraperitoneal chemo,12/2015: Ca125 - 25     Splenic lesion         Past Surgical History:   Procedure Laterality Date    ABSCESS DRAINAGE  2013    Franca rectal    ANUS SURGERY      ANAL FISSURECTOMY    CARDIAC CATHETERIZATION      COLECTOMY  03/2013    ex lap, tumor debulking, transverse colectomy w reanastamosis, subgastric omentectomy, intraperitoneal port placement    HYSTERECTOMY, TOTAL ABDOMINAL      IR EMBOLIZATION HEMORRHAGE  10/05/2020    intra-abdominal bleeding -due to splenic mass with GI infiltration. Status post embolization [employer] interlock coils x7. mri condtional 3t ok, safe immediately post implant.     IR PORT PLACEMENT EQUAL OR GREATER THAN 5 YEARS  08/24/2020    IR PORT PLACEMENT EQUAL OR GREATER THAN 5 YEARS 8/24/2020 Onel Crisostomo MD [facility]    PORT SURGERY      IP Port    TONSILLECTOMY          Social History     Socioeconomic History    Marital status: Single     Spouse name: Not on file    Number of children: Not on file    Years of education: Not on file    Highest education level: Not on file   Occupational History    Not on file   Tobacco Use    Smoking status: Current Every Day Smoker     Packs/day: 1.00     Types: Cigarettes    Smokeless tobacco: Current User   Vaping Use    Vaping Use: Never used   Substance and Sexual Activity    Alcohol use: Not Currently    Drug use: Never    Sexual activity: Not on file   Other Topics Concern    Not on file   Social History Narrative    Not on file     Social Determinants of Health     Financial Resource (MOVANTIK) 12.5 MG TABS tablet Take 1 tablet by mouth every morning 30 tablet 1    ferrous sulfate (IRON 325) 325 (65 Fe) MG tablet Take 1 tablet by mouth daily (with breakfast) 30 tablet 2    morphine (MSIR) 30 MG tablet Take 30 mg by mouth 2 times daily as needed for Pain.  lamoTRIgine (LAMICTAL) 25 MG tablet TAKE 3 TABS IN IN THE MORNING AND 3 TABS IN IN THE EVENING 180 tablet 2    sertraline (ZOLOFT) 100 MG tablet TAKE 1 TABLET BY MOUTH DAILY 30 tablet 3    levETIRAcetam (KEPPRA) 750 MG tablet Take 2 tablets by mouth 2 times daily 240 tablet 2    ELIQUIS 5 MG TABS tablet Take 5 mg by mouth 2 times daily        No current facility-administered medications for this visit. Allergies   Allergen Reactions    Ceftriaxone      Had a rash on ceftriaxone December 2020, [facility]        REVIEW OF SYSTEMS:     Review of Systems   Constitutional: Negative for chills, diaphoresis, fatigue and fever. HENT: Negative for congestion, dental problem, drooling and ear discharge. Eyes: Negative for photophobia, pain, redness and visual disturbance. Respiratory: Negative for apnea, cough, choking and chest tightness. Cardiovascular: Negative for chest pain. Gastrointestinal: Negative for abdominal distention and abdominal pain. Endocrine: Negative for polyphagia and polyuria. Genitourinary: Negative for dysuria. Musculoskeletal: Negative for arthralgias and back pain. Neurological: Negative for dizziness, tremors, seizures, syncope, facial asymmetry, speech difficulty, weakness, light-headedness, numbness and headaches. Hematological: Negative for adenopathy. Psychiatric/Behavioral: Positive for agitation. no agitation     VITALS  /72   Pulse 84   Temp 97.6 °F (36.4 °C)   Resp 16      PHYSICAL EXAMINATION:     Physical Exam   General appearance: cooperative  Skin: no rash or skin lesions.   HEENT: normocephalic  Optic Fundi: deferred  Neck: supple, no cervcical adenopathy or carotid bruit  Lungs: clear to auscultation  Heart: Regular rate and rhythm,   Peripheral pulses: radial pulses palpable  Abdominal: BS present, soft, NT, ND  Extremities: no edema    NEUROLOGICAL EXAMINATION:     GENERAL  Appears comfortable and in no distress   HEENT  NC/ AT   HEART  S1 and S2 heard; palpation of pulses: radial pulse    NECK  Supple and no bruits heard   MENTAL STATUS:  Alert, oriented, intact memory, no confusion, normal speech, normal language, no hallucination or delusion   CRANIAL NERVES: II     -      Visual fields intact to confrontation  III,IV,VI -  PERR, EOMs full, no ptosis  V     -     Normal facial sensation   VII    -     Normal facial symmetry  VIII   -     Intact hearing   IX,X -     Symmetrical palate  XI    -     Symmetrical shoulder shrug  XII   -     Midline tongue, no atrophy    MOTOR FUNCTION: RUE: Significant for good strength of grade 5/5 in proximal and distal muscle groups   LUE: Significant for good strength of grade 5/5 in proximal and distal muscle groups   RLE: Significant for good strength of grade 5/5 in proximal and distal muscle groups   LLE: Significant for good strength of grade 5/5 in proximal and distal muscle groups      Normal bulk, normal tone and no involuntary movements, no tremor   SENSORY FUNCTION:  Normal touch, normal pin, normal vibration, normal proprioception   CEREBELLAR FUNCTION:  Intact fine motor control over upper limbs and lower limbs   REFLEX FUNCTION:  Symmetric in upper and lower extremities, no Babinski sign   STATION and GAIT  Normal gait and tandem station, normal tip toes and heel walking         ASSESSMENT:       Rafaela Severance is a 60 y.o. 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     Ultimately in October with bilateral PLEDs  MRI in October with press syndrome      PLAN: -Continue Keppra 1500 mg twice daily  -Cont  Lamictal 75 mg twice daily.  -Seizure precautions discussed with the patient, No driving for at least 6 months.  -Follow-up with neurology clinic in 6 months. Ms. Adam Griffith received counseling on the following healthy behaviors: medical compliance, smoking cessation, blood pressure control, regular follow up with primary doctor.         Electronically signed by Melonie Malagon MD on 2/9/2022 at 3:36 PM

## 2022-02-10 ENCOUNTER — TELEPHONE (OUTPATIENT)
Dept: NEUROLOGY | Age: 57
End: 2022-02-10

## 2022-02-10 DIAGNOSIS — R56.9 SEIZURE (HCC): Primary | ICD-10-CM

## 2022-02-10 RX ORDER — DIAZEPAM 20 MG/4ML
20 GEL RECTAL
Qty: 2 EACH | Refills: 0 | Status: SHIPPED
Start: 2022-02-10 | End: 2022-02-18

## 2022-02-10 NOTE — TELEPHONE ENCOUNTER
Pharmacy called regarding Diazepam (nasel spray) the cost is 600 dollars per spray. Her insurance will not cover it. Please write for a different form of this medication.

## 2022-02-11 ENCOUNTER — HOSPITAL ENCOUNTER (OUTPATIENT)
Dept: INFUSION THERAPY | Age: 57
End: 2022-02-11

## 2022-02-14 ENCOUNTER — TELEPHONE (OUTPATIENT)
Dept: ONCOLOGY | Age: 57
End: 2022-02-14

## 2022-02-14 NOTE — TELEPHONE ENCOUNTER
Called to follow up on referral to Becky Felton [facility]. Left voice mail to check on status of referral. Nivia Arceo updated.

## 2022-02-18 ENCOUNTER — HOSPITAL ENCOUNTER (OUTPATIENT)
Dept: INFUSION THERAPY | Age: 57
Discharge: HOME | End: 2022-02-18
Payer: COMMERCIAL

## 2022-02-18 ENCOUNTER — TELEPHONE (OUTPATIENT)
Dept: ONCOLOGY | Age: 57
End: 2022-02-18

## 2022-02-18 ENCOUNTER — OFFICE VISIT (OUTPATIENT)
Dept: ONCOLOGY | Age: 57
End: 2022-02-18
Payer: COMMERCIAL

## 2022-02-18 VITALS
BODY MASS INDEX: 27.37 KG/M2 | TEMPERATURE: 97.8 F | RESPIRATION RATE: 16 BRPM | SYSTOLIC BLOOD PRESSURE: 135 MMHG | HEART RATE: 87 BPM | DIASTOLIC BLOOD PRESSURE: 76 MMHG | WEIGHT: 164.5 LBS

## 2022-02-18 DIAGNOSIS — C79.51 CANCER, METASTATIC TO BONE (HCC): ICD-10-CM

## 2022-02-18 DIAGNOSIS — C56.9 MALIGNANT NEOPLASM OF OVARY, UNSPECIFIED LATERALITY (HCC): Primary | ICD-10-CM

## 2022-02-18 DIAGNOSIS — F32.A DEPRESSION, UNSPECIFIED DEPRESSION TYPE: ICD-10-CM

## 2022-02-18 DIAGNOSIS — C79.60 MALIGNANT NEOPLASM METASTATIC TO OVARY, UNSPECIFIED LATERALITY (HCC): Primary | ICD-10-CM

## 2022-02-18 DIAGNOSIS — Z85.43 HISTORY OF OVARIAN CANCER: ICD-10-CM

## 2022-02-18 DIAGNOSIS — G47.00 INSOMNIA, UNSPECIFIED TYPE: ICD-10-CM

## 2022-02-18 LAB
ABSOLUTE EOS #: 0.07 K/UL (ref 0–0.4)
ABSOLUTE IMMATURE GRANULOCYTE: ABNORMAL K/UL (ref 0–0.3)
ABSOLUTE LYMPH #: 1.02 K/UL (ref 1–4.8)
ABSOLUTE MONO #: 0.68 K/UL (ref 0.1–0.8)
ALBUMIN SERPL-MCNC: 4 G/DL (ref 3.5–5.2)
ALBUMIN/GLOBULIN RATIO: 1.5 (ref 1–2.5)
ALP BLD-CCNC: 68 U/L (ref 35–104)
ALT SERPL-CCNC: <5 U/L (ref 5–33)
ANION GAP SERPL CALCULATED.3IONS-SCNC: 11 MMOL/L (ref 9–17)
AST SERPL-CCNC: 12 U/L
BASOPHILS # BLD: 1 % (ref 0–2)
BASOPHILS ABSOLUTE: 0.07 K/UL (ref 0–0.2)
BILIRUB SERPL-MCNC: 0.17 MG/DL (ref 0.3–1.2)
BUN BLDV-MCNC: 9 MG/DL (ref 6–20)
BUN/CREAT BLD: ABNORMAL (ref 9–20)
CA 125: 335 U/ML
CALCIUM SERPL-MCNC: 9.2 MG/DL (ref 8.6–10.4)
CHLORIDE BLD-SCNC: 104 MMOL/L (ref 98–107)
CO2: 25 MMOL/L (ref 20–31)
CREAT SERPL-MCNC: 0.47 MG/DL (ref 0.5–0.9)
DIFFERENTIAL TYPE: ABNORMAL
EOSINOPHILS RELATIVE PERCENT: 1 % (ref 1–4)
GFR AFRICAN AMERICAN: >60 ML/MIN
GFR NON-AFRICAN AMERICAN: >60 ML/MIN
GFR SERPL CREATININE-BSD FRML MDRD: ABNORMAL ML/MIN/{1.73_M2}
GFR SERPL CREATININE-BSD FRML MDRD: ABNORMAL ML/MIN/{1.73_M2}
GLUCOSE BLD-MCNC: 99 MG/DL (ref 70–99)
HCT VFR BLD CALC: 37 % (ref 36–46)
HEMOGLOBIN: 12 G/DL (ref 12–16)
IMMATURE GRANULOCYTES: ABNORMAL %
LYMPHOCYTES # BLD: 15 % (ref 24–44)
MCH RBC QN AUTO: 29.6 PG (ref 26–34)
MCHC RBC AUTO-ENTMCNC: 32.4 G/DL (ref 31–37)
MCV RBC AUTO: 91.4 FL (ref 80–100)
MONOCYTES # BLD: 10 % (ref 1–7)
MORPHOLOGY: NORMAL
NRBC AUTOMATED: ABNORMAL PER 100 WBC
PDW BLD-RTO: 18.9 % (ref 12.5–15.4)
PLATELET # BLD: 856 K/UL (ref 140–450)
PLATELET ESTIMATE: ABNORMAL
PMV BLD AUTO: 6.7 FL (ref 6–12)
POTASSIUM SERPL-SCNC: 3.6 MMOL/L (ref 3.7–5.3)
RBC # BLD: 4.05 M/UL (ref 4–5.2)
RBC # BLD: ABNORMAL 10*6/UL
SEG NEUTROPHILS: 73 % (ref 36–66)
SEGMENTED NEUTROPHILS ABSOLUTE COUNT: 4.96 K/UL (ref 1.8–7.7)
SODIUM BLD-SCNC: 140 MMOL/L (ref 135–144)
TOTAL PROTEIN: 6.7 G/DL (ref 6.4–8.3)
WBC # BLD: 6.8 K/UL (ref 3.5–11)
WBC # BLD: ABNORMAL 10*3/UL

## 2022-02-18 PROCEDURE — 6360000002 HC RX W HCPCS: Performed by: INTERNAL MEDICINE

## 2022-02-18 PROCEDURE — 85025 COMPLETE CBC W/AUTO DIFF WBC: CPT

## 2022-02-18 PROCEDURE — 86304 IMMUNOASSAY TUMOR CA 125: CPT

## 2022-02-18 PROCEDURE — G8428 CUR MEDS NOT DOCUMENT: HCPCS | Performed by: INTERNAL MEDICINE

## 2022-02-18 PROCEDURE — 80053 COMPREHEN METABOLIC PANEL: CPT

## 2022-02-18 PROCEDURE — 96375 TX/PRO/DX INJ NEW DRUG ADDON: CPT

## 2022-02-18 PROCEDURE — 36591 DRAW BLOOD OFF VENOUS DEVICE: CPT

## 2022-02-18 PROCEDURE — 99211 OFF/OP EST MAY X REQ PHY/QHP: CPT | Performed by: INTERNAL MEDICINE

## 2022-02-18 PROCEDURE — 96413 CHEMO IV INFUSION 1 HR: CPT

## 2022-02-18 PROCEDURE — 2580000003 HC RX 258: Performed by: INTERNAL MEDICINE

## 2022-02-18 PROCEDURE — 3017F COLORECTAL CA SCREEN DOC REV: CPT | Performed by: INTERNAL MEDICINE

## 2022-02-18 PROCEDURE — G8484 FLU IMMUNIZE NO ADMIN: HCPCS | Performed by: INTERNAL MEDICINE

## 2022-02-18 PROCEDURE — 4004F PT TOBACCO SCREEN RCVD TLK: CPT | Performed by: INTERNAL MEDICINE

## 2022-02-18 PROCEDURE — G8417 CALC BMI ABV UP PARAM F/U: HCPCS | Performed by: INTERNAL MEDICINE

## 2022-02-18 PROCEDURE — 99214 OFFICE O/P EST MOD 30 MIN: CPT | Performed by: INTERNAL MEDICINE

## 2022-02-18 RX ORDER — DEXAMETHASONE SODIUM PHOSPHATE 4 MG/ML
8 INJECTION, SOLUTION INTRA-ARTICULAR; INTRALESIONAL; INTRAMUSCULAR; INTRAVENOUS; SOFT TISSUE ONCE
Status: COMPLETED | OUTPATIENT
Start: 2022-02-18 | End: 2022-02-18

## 2022-02-18 RX ORDER — POLYETHYLENE GLYCOL 3350 17 G/17G
17 POWDER, FOR SOLUTION ORAL DAILY PRN
Qty: 527 G | Refills: 1 | Status: SHIPPED | OUTPATIENT
Start: 2022-02-18 | End: 2022-03-20

## 2022-02-18 RX ORDER — SODIUM CHLORIDE 9 MG/ML
20 INJECTION, SOLUTION INTRAVENOUS ONCE
Status: COMPLETED | OUTPATIENT
Start: 2022-02-18 | End: 2022-02-18

## 2022-02-18 RX ORDER — LORAZEPAM 1 MG/1
1 TABLET ORAL EVERY 8 HOURS PRN
Qty: 90 TABLET | Refills: 0 | Status: SHIPPED | OUTPATIENT
Start: 2022-02-18 | End: 2022-04-02

## 2022-02-18 RX ORDER — SODIUM CHLORIDE 0.9 % (FLUSH) 0.9 %
10 SYRINGE (ML) INJECTION PRN
Status: DISCONTINUED | OUTPATIENT
Start: 2022-02-18 | End: 2022-02-19 | Stop reason: HOSPADM

## 2022-02-18 RX ORDER — HEPARIN SODIUM (PORCINE) LOCK FLUSH IV SOLN 100 UNIT/ML 100 UNIT/ML
500 SOLUTION INTRAVENOUS PRN
Status: DISCONTINUED | OUTPATIENT
Start: 2022-02-18 | End: 2022-02-19 | Stop reason: HOSPADM

## 2022-02-18 RX ADMIN — GEMCITABINE 1800 MG: 38 INJECTION, SOLUTION INTRAVENOUS at 11:38

## 2022-02-18 RX ADMIN — SODIUM CHLORIDE 20 ML/HR: 9 INJECTION, SOLUTION INTRAVENOUS at 11:09

## 2022-02-18 RX ADMIN — SODIUM CHLORIDE, PRESERVATIVE FREE 10 ML: 5 INJECTION INTRAVENOUS at 11:09

## 2022-02-18 RX ADMIN — DEXAMETHASONE SODIUM PHOSPHATE 8 MG: 4 INJECTION, SOLUTION INTRAMUSCULAR; INTRAVENOUS at 11:11

## 2022-02-18 RX ADMIN — SODIUM CHLORIDE, PRESERVATIVE FREE 10 ML: 5 INJECTION INTRAVENOUS at 12:21

## 2022-02-18 RX ADMIN — HEPARIN 500 UNITS: 100 SYRINGE at 12:21

## 2022-02-18 NOTE — PROGRESS NOTES
Patient here for C14D1 Gemzar. Labs reviewed. She saw Dr. Kateryna Baird today see his dictation. She tolerated treatment well and was discharged home in stable condition. She is due to return 2/25 for C14D8 Gemzar.

## 2022-02-18 NOTE — TELEPHONE ENCOUNTER
Continue treatment  RV 4-5 weeks  CT scans before Rv    Tx today as scheduled    RV scheduled 3/18/22 @ 12pm    CT scheduled 3/9/22 @ 2pm    Referral placed by Obdulia hudson for phychosocial counseling, no specific office listed, will confirm with navigator as to location or any additional records needed    PT was given AVS and an appt schedule    Electronically signed by Arsen Dorado on 2/18/2022 at 11:17 AM

## 2022-02-20 ENCOUNTER — APPOINTMENT (OUTPATIENT)
Dept: CT IMAGING | Age: 57
DRG: 388 | End: 2022-02-20
Payer: COMMERCIAL

## 2022-02-20 ENCOUNTER — APPOINTMENT (OUTPATIENT)
Dept: GENERAL RADIOLOGY | Age: 57
DRG: 388 | End: 2022-02-20
Payer: COMMERCIAL

## 2022-02-20 ENCOUNTER — HOSPITAL ENCOUNTER (INPATIENT)
Age: 57
LOS: 8 days | Discharge: HOME | DRG: 388 | End: 2022-02-28
Attending: EMERGENCY MEDICINE | Admitting: INTERNAL MEDICINE
Payer: COMMERCIAL

## 2022-02-20 DIAGNOSIS — E86.0 DEHYDRATION: ICD-10-CM

## 2022-02-20 DIAGNOSIS — R41.0 CONFUSION: Primary | ICD-10-CM

## 2022-02-20 DIAGNOSIS — C79.51 CANCER, METASTATIC TO BONE (HCC): ICD-10-CM

## 2022-02-20 DIAGNOSIS — M54.59 INTRACTABLE LOW BACK PAIN: ICD-10-CM

## 2022-02-20 DIAGNOSIS — R53.83 FATIGUE, UNSPECIFIED TYPE: ICD-10-CM

## 2022-02-20 DIAGNOSIS — R09.02 HYPOXIA: ICD-10-CM

## 2022-02-20 PROBLEM — R41.82 AMS (ALTERED MENTAL STATUS): Status: ACTIVE | Noted: 2022-02-20

## 2022-02-20 LAB
-: ABNORMAL
ABSOLUTE EOS #: 0 K/UL (ref 0–0.4)
ABSOLUTE IMMATURE GRANULOCYTE: 0 K/UL (ref 0–0.3)
ABSOLUTE LYMPH #: 0.2 K/UL (ref 1–4.8)
ABSOLUTE MONO #: 2.2 K/UL (ref 0.1–0.8)
ALBUMIN SERPL-MCNC: 4.2 G/DL (ref 3.5–5.2)
ALBUMIN/GLOBULIN RATIO: 1.7 (ref 1–2.5)
ALP BLD-CCNC: 65 U/L (ref 35–104)
ALT SERPL-CCNC: 7 U/L (ref 5–33)
AMORPHOUS: ABNORMAL
ANION GAP SERPL CALCULATED.3IONS-SCNC: 17 MMOL/L (ref 9–17)
AST SERPL-CCNC: 12 U/L
BACTERIA: ABNORMAL
BASOPHILS # BLD: 0 % (ref 0–2)
BASOPHILS ABSOLUTE: 0 K/UL (ref 0–0.2)
BILIRUB SERPL-MCNC: 0.16 MG/DL (ref 0.3–1.2)
BILIRUBIN URINE: NEGATIVE
BNP INTERPRETATION: ABNORMAL
BUN BLDV-MCNC: 13 MG/DL (ref 6–20)
BUN/CREAT BLD: ABNORMAL (ref 9–20)
CALCIUM SERPL-MCNC: 9 MG/DL (ref 8.6–10.4)
CASTS UA: ABNORMAL /LPF (ref 0–8)
CHLORIDE BLD-SCNC: 103 MMOL/L (ref 98–107)
CO2: 22 MMOL/L (ref 20–31)
COLOR: YELLOW
CREAT SERPL-MCNC: 0.47 MG/DL (ref 0.5–0.9)
CRYSTALS, UA: ABNORMAL /HPF
DIFFERENTIAL TYPE: ABNORMAL
EOSINOPHILS RELATIVE PERCENT: 0 % (ref 1–4)
EPITHELIAL CELLS UA: ABNORMAL /HPF (ref 0–5)
GFR AFRICAN AMERICAN: >60 ML/MIN
GFR NON-AFRICAN AMERICAN: >60 ML/MIN
GFR SERPL CREATININE-BSD FRML MDRD: ABNORMAL ML/MIN/{1.73_M2}
GFR SERPL CREATININE-BSD FRML MDRD: ABNORMAL ML/MIN/{1.73_M2}
GLUCOSE BLD-MCNC: 150 MG/DL (ref 70–99)
GLUCOSE URINE: ABNORMAL
HCT VFR BLD CALC: 39.5 % (ref 36.3–47.1)
HEMOGLOBIN: 11.9 G/DL (ref 11.9–15.1)
IMMATURE GRANULOCYTES: 0 %
KEPPRA: 22 UG/ML
KEPPRA: 4 UG/ML
KETONES, URINE: NEGATIVE
LACTIC ACID, SEPSIS WHOLE BLOOD: 1.5 MMOL/L (ref 0.5–1.9)
LACTIC ACID, SEPSIS WHOLE BLOOD: 2.8 MMOL/L (ref 0.5–1.9)
LACTIC ACID, SEPSIS: ABNORMAL MMOL/L (ref 0.5–1.9)
LACTIC ACID, SEPSIS: NORMAL MMOL/L (ref 0.5–1.9)
LAMOTRIGINE LEVEL: 1.3 UG/ML (ref 3–15)
LAMOTRIGINE LEVEL: 1.3 UG/ML (ref 3–15)
LEUKOCYTE ESTERASE, URINE: NEGATIVE
LIPASE: 13 U/L (ref 13–60)
LYMPHOCYTES # BLD: 1 % (ref 24–44)
MAGNESIUM: 1.8 MG/DL (ref 1.6–2.6)
MCH RBC QN AUTO: 29.2 PG (ref 25.2–33.5)
MCHC RBC AUTO-ENTMCNC: 30.1 G/DL (ref 28.4–34.8)
MCV RBC AUTO: 96.8 FL (ref 82.6–102.9)
MONOCYTES # BLD: 11 % (ref 1–7)
MORPHOLOGY: ABNORMAL
MUCUS: ABNORMAL
NITRITE, URINE: NEGATIVE
NRBC AUTOMATED: 0 PER 100 WBC
OTHER OBSERVATIONS UA: ABNORMAL
PDW BLD-RTO: 17.7 % (ref 11.8–14.4)
PH UA: 5.5 (ref 5–8)
PLATELET # BLD: 788 K/UL (ref 138–453)
PLATELET ESTIMATE: ABNORMAL
PMV BLD AUTO: 9.3 FL (ref 8.1–13.5)
POTASSIUM SERPL-SCNC: 3.4 MMOL/L (ref 3.7–5.3)
PRO-BNP: 328 PG/ML
PROLACTIN: 4.52 UG/L (ref 4.79–23.3)
PROTEIN UA: NEGATIVE
RBC # BLD: 4.08 M/UL (ref 3.95–5.11)
RBC # BLD: ABNORMAL 10*6/UL
RBC UA: ABNORMAL /HPF (ref 0–4)
RENAL EPITHELIAL, UA: ABNORMAL /HPF
SEG NEUTROPHILS: 88 % (ref 36–66)
SEGMENTED NEUTROPHILS ABSOLUTE COUNT: 17.6 K/UL (ref 1.8–7.7)
SODIUM BLD-SCNC: 142 MMOL/L (ref 135–144)
SPECIFIC GRAVITY UA: 1.02 (ref 1–1.03)
TOTAL PROTEIN: 6.7 G/DL (ref 6.4–8.3)
TRICHOMONAS: ABNORMAL
TROPONIN INTERP: ABNORMAL
TROPONIN T: ABNORMAL NG/ML
TROPONIN, HIGH SENSITIVITY: 17 NG/L (ref 0–14)
TROPONIN, HIGH SENSITIVITY: 51 NG/L (ref 0–14)
TROPONIN, HIGH SENSITIVITY: 57 NG/L (ref 0–14)
TURBIDITY: ABNORMAL
URINE HGB: NEGATIVE
UROBILINOGEN, URINE: NORMAL
WBC # BLD: 20 K/UL (ref 3.5–11.3)
WBC # BLD: ABNORMAL 10*3/UL
WBC UA: ABNORMAL /HPF (ref 0–5)
YEAST: ABNORMAL

## 2022-02-20 PROCEDURE — 2580000003 HC RX 258: Performed by: STUDENT IN AN ORGANIZED HEALTH CARE EDUCATION/TRAINING PROGRAM

## 2022-02-20 PROCEDURE — 70450 CT HEAD/BRAIN W/O DYE: CPT

## 2022-02-20 PROCEDURE — 74018 RADEX ABDOMEN 1 VIEW: CPT

## 2022-02-20 PROCEDURE — 87088 URINE BACTERIA CULTURE: CPT

## 2022-02-20 PROCEDURE — 71260 CT THORAX DX C+: CPT

## 2022-02-20 PROCEDURE — 6360000002 HC RX W HCPCS: Performed by: EMERGENCY MEDICINE

## 2022-02-20 PROCEDURE — 6360000002 HC RX W HCPCS: Performed by: STUDENT IN AN ORGANIZED HEALTH CARE EDUCATION/TRAINING PROGRAM

## 2022-02-20 PROCEDURE — 81001 URINALYSIS AUTO W/SCOPE: CPT

## 2022-02-20 PROCEDURE — 84146 ASSAY OF PROLACTIN: CPT

## 2022-02-20 PROCEDURE — 80053 COMPREHEN METABOLIC PANEL: CPT

## 2022-02-20 PROCEDURE — 6360000004 HC RX CONTRAST MEDICATION: Performed by: STUDENT IN AN ORGANIZED HEALTH CARE EDUCATION/TRAINING PROGRAM

## 2022-02-20 PROCEDURE — 83690 ASSAY OF LIPASE: CPT

## 2022-02-20 PROCEDURE — 6370000000 HC RX 637 (ALT 250 FOR IP): Performed by: STUDENT IN AN ORGANIZED HEALTH CARE EDUCATION/TRAINING PROGRAM

## 2022-02-20 PROCEDURE — 1200000000 HC SEMI PRIVATE

## 2022-02-20 PROCEDURE — 96375 TX/PRO/DX INJ NEW DRUG ADDON: CPT

## 2022-02-20 PROCEDURE — 83605 ASSAY OF LACTIC ACID: CPT

## 2022-02-20 PROCEDURE — 80177 DRUG SCRN QUAN LEVETIRACETAM: CPT

## 2022-02-20 PROCEDURE — 84484 ASSAY OF TROPONIN QUANT: CPT

## 2022-02-20 PROCEDURE — 96365 THER/PROPH/DIAG IV INF INIT: CPT

## 2022-02-20 PROCEDURE — 85025 COMPLETE CBC W/AUTO DIFF WBC: CPT

## 2022-02-20 PROCEDURE — 83735 ASSAY OF MAGNESIUM: CPT

## 2022-02-20 PROCEDURE — 71045 X-RAY EXAM CHEST 1 VIEW: CPT

## 2022-02-20 PROCEDURE — 99223 1ST HOSP IP/OBS HIGH 75: CPT | Performed by: INTERNAL MEDICINE

## 2022-02-20 PROCEDURE — 93005 ELECTROCARDIOGRAM TRACING: CPT | Performed by: STUDENT IN AN ORGANIZED HEALTH CARE EDUCATION/TRAINING PROGRAM

## 2022-02-20 PROCEDURE — 87040 BLOOD CULTURE FOR BACTERIA: CPT

## 2022-02-20 PROCEDURE — 87086 URINE CULTURE/COLONY COUNT: CPT

## 2022-02-20 PROCEDURE — 96372 THER/PROPH/DIAG INJ SC/IM: CPT

## 2022-02-20 PROCEDURE — 36415 COLL VENOUS BLD VENIPUNCTURE: CPT

## 2022-02-20 PROCEDURE — 83880 ASSAY OF NATRIURETIC PEPTIDE: CPT

## 2022-02-20 PROCEDURE — 87186 SC STD MICRODIL/AGAR DIL: CPT

## 2022-02-20 PROCEDURE — 80175 DRUG SCREEN QUAN LAMOTRIGINE: CPT

## 2022-02-20 PROCEDURE — 99285 EMERGENCY DEPT VISIT HI MDM: CPT

## 2022-02-20 RX ORDER — MAGNESIUM SULFATE IN WATER 40 MG/ML
2000 INJECTION, SOLUTION INTRAVENOUS ONCE
Status: COMPLETED | OUTPATIENT
Start: 2022-02-20 | End: 2022-02-21

## 2022-02-20 RX ORDER — POTASSIUM CHLORIDE 20 MEQ/1
40 TABLET, EXTENDED RELEASE ORAL PRN
Status: DISCONTINUED | OUTPATIENT
Start: 2022-02-20 | End: 2022-02-28 | Stop reason: HOSPADM

## 2022-02-20 RX ORDER — PROMETHAZINE HYDROCHLORIDE 12.5 MG/1
12.5 TABLET ORAL EVERY 6 HOURS PRN
Status: DISCONTINUED | OUTPATIENT
Start: 2022-02-20 | End: 2022-02-28 | Stop reason: HOSPADM

## 2022-02-20 RX ORDER — LABETALOL HYDROCHLORIDE 5 MG/ML
10 INJECTION, SOLUTION INTRAVENOUS EVERY 6 HOURS PRN
Status: DISCONTINUED | OUTPATIENT
Start: 2022-02-20 | End: 2022-02-23

## 2022-02-20 RX ORDER — LAMOTRIGINE 25 MG/1
75 TABLET ORAL 2 TIMES DAILY
Status: DISCONTINUED | OUTPATIENT
Start: 2022-02-21 | End: 2022-02-22

## 2022-02-20 RX ORDER — SODIUM CHLORIDE 9 MG/ML
25 INJECTION, SOLUTION INTRAVENOUS PRN
Status: DISCONTINUED | OUTPATIENT
Start: 2022-02-20 | End: 2022-02-28 | Stop reason: HOSPADM

## 2022-02-20 RX ORDER — LAMOTRIGINE 100 MG/1
100 TABLET ORAL ONCE
Status: COMPLETED | OUTPATIENT
Start: 2022-02-20 | End: 2022-02-20

## 2022-02-20 RX ORDER — ONDANSETRON 4 MG/1
4 TABLET, ORALLY DISINTEGRATING ORAL EVERY 8 HOURS PRN
Status: DISCONTINUED | OUTPATIENT
Start: 2022-02-20 | End: 2022-02-21

## 2022-02-20 RX ORDER — ONDANSETRON 2 MG/ML
INJECTION INTRAMUSCULAR; INTRAVENOUS
Status: DISCONTINUED
Start: 2022-02-20 | End: 2022-02-21

## 2022-02-20 RX ORDER — SODIUM CHLORIDE 9 MG/ML
10 INJECTION INTRAVENOUS DAILY
Status: DISCONTINUED | OUTPATIENT
Start: 2022-02-21 | End: 2022-02-28 | Stop reason: HOSPADM

## 2022-02-20 RX ORDER — SODIUM CHLORIDE 0.9 % (FLUSH) 0.9 %
5-40 SYRINGE (ML) INJECTION EVERY 12 HOURS SCHEDULED
Status: DISCONTINUED | OUTPATIENT
Start: 2022-02-20 | End: 2022-02-28 | Stop reason: HOSPADM

## 2022-02-20 RX ORDER — ONDANSETRON 2 MG/ML
4 INJECTION INTRAMUSCULAR; INTRAVENOUS ONCE
Status: COMPLETED | OUTPATIENT
Start: 2022-02-20 | End: 2022-02-20

## 2022-02-20 RX ORDER — PROMETHAZINE HYDROCHLORIDE 25 MG/ML
12.5 INJECTION, SOLUTION INTRAMUSCULAR; INTRAVENOUS ONCE
Status: COMPLETED | OUTPATIENT
Start: 2022-02-20 | End: 2022-02-20

## 2022-02-20 RX ORDER — ONDANSETRON 2 MG/ML
4 INJECTION INTRAMUSCULAR; INTRAVENOUS EVERY 6 HOURS PRN
Status: DISCONTINUED | OUTPATIENT
Start: 2022-02-20 | End: 2022-02-28 | Stop reason: HOSPADM

## 2022-02-20 RX ORDER — LEVETIRACETAM 15 MG/ML
1500 INJECTION INTRAVASCULAR EVERY 12 HOURS
Status: DISCONTINUED | OUTPATIENT
Start: 2022-02-20 | End: 2022-02-25

## 2022-02-20 RX ORDER — SODIUM CHLORIDE 0.9 % (FLUSH) 0.9 %
5-40 SYRINGE (ML) INJECTION PRN
Status: DISCONTINUED | OUTPATIENT
Start: 2022-02-20 | End: 2022-02-28 | Stop reason: HOSPADM

## 2022-02-20 RX ORDER — SODIUM CHLORIDE 9 MG/ML
INJECTION, SOLUTION INTRAVENOUS CONTINUOUS
Status: DISCONTINUED | OUTPATIENT
Start: 2022-02-20 | End: 2022-02-26

## 2022-02-20 RX ORDER — ONDANSETRON 2 MG/ML
4 INJECTION INTRAMUSCULAR; INTRAVENOUS EVERY 6 HOURS PRN
Status: DISCONTINUED | OUTPATIENT
Start: 2022-02-20 | End: 2022-02-20

## 2022-02-20 RX ORDER — POTASSIUM CHLORIDE 7.45 MG/ML
40 INJECTION INTRAVENOUS EVERY 6 HOURS
Status: COMPLETED | OUTPATIENT
Start: 2022-02-20 | End: 2022-02-21

## 2022-02-20 RX ORDER — POLYETHYLENE GLYCOL 3350 17 G/17G
17 POWDER, FOR SOLUTION ORAL DAILY PRN
Status: DISCONTINUED | OUTPATIENT
Start: 2022-02-20 | End: 2022-02-21

## 2022-02-20 RX ORDER — ACETAMINOPHEN 325 MG/1
650 TABLET ORAL EVERY 6 HOURS PRN
Status: DISCONTINUED | OUTPATIENT
Start: 2022-02-20 | End: 2022-02-28 | Stop reason: HOSPADM

## 2022-02-20 RX ORDER — 0.9 % SODIUM CHLORIDE 0.9 %
1000 INTRAVENOUS SOLUTION INTRAVENOUS ONCE
Status: COMPLETED | OUTPATIENT
Start: 2022-02-20 | End: 2022-02-20

## 2022-02-20 RX ORDER — ACETAMINOPHEN 650 MG/1
650 SUPPOSITORY RECTAL EVERY 6 HOURS PRN
Status: DISCONTINUED | OUTPATIENT
Start: 2022-02-20 | End: 2022-02-28 | Stop reason: HOSPADM

## 2022-02-20 RX ORDER — POTASSIUM CHLORIDE 7.45 MG/ML
10 INJECTION INTRAVENOUS PRN
Status: DISCONTINUED | OUTPATIENT
Start: 2022-02-20 | End: 2022-02-28 | Stop reason: HOSPADM

## 2022-02-20 RX ORDER — PANTOPRAZOLE SODIUM 40 MG/10ML
40 INJECTION, POWDER, LYOPHILIZED, FOR SOLUTION INTRAVENOUS DAILY
Status: DISCONTINUED | OUTPATIENT
Start: 2022-02-21 | End: 2022-02-28 | Stop reason: HOSPADM

## 2022-02-20 RX ORDER — MORPHINE SULFATE 4 MG/ML
1 INJECTION, SOLUTION INTRAMUSCULAR; INTRAVENOUS EVERY 4 HOURS PRN
Status: DISCONTINUED | OUTPATIENT
Start: 2022-02-20 | End: 2022-02-28 | Stop reason: HOSPADM

## 2022-02-20 RX ORDER — ACETAMINOPHEN 325 MG/1
650 TABLET ORAL EVERY 4 HOURS PRN
Status: DISCONTINUED | OUTPATIENT
Start: 2022-02-20 | End: 2022-02-20 | Stop reason: SDUPTHER

## 2022-02-20 RX ADMIN — ONDANSETRON 4 MG: 2 INJECTION, SOLUTION INTRAMUSCULAR; INTRAVENOUS at 10:27

## 2022-02-20 RX ADMIN — SODIUM CHLORIDE 1000 ML: 9 INJECTION, SOLUTION INTRAVENOUS at 10:27

## 2022-02-20 RX ADMIN — MAGNESIUM SULFATE 2000 MG: 2 INJECTION INTRAVENOUS at 23:58

## 2022-02-20 RX ADMIN — IOPAMIDOL 75 ML: 755 INJECTION, SOLUTION INTRAVENOUS at 13:56

## 2022-02-20 RX ADMIN — SODIUM CHLORIDE: 9 INJECTION, SOLUTION INTRAVENOUS at 23:14

## 2022-02-20 RX ADMIN — MORPHINE SULFATE 1 MG: 4 INJECTION INTRAVENOUS at 23:04

## 2022-02-20 RX ADMIN — LEVETIRACETAM 1500 MG: 15 INJECTION INTRAVENOUS at 23:25

## 2022-02-20 RX ADMIN — ONDANSETRON 4 MG: 2 INJECTION INTRAMUSCULAR; INTRAVENOUS at 20:20

## 2022-02-20 RX ADMIN — PROMETHAZINE HYDROCHLORIDE 12.5 MG: 25 INJECTION INTRAMUSCULAR; INTRAVENOUS at 14:13

## 2022-02-20 RX ADMIN — LAMOTRIGINE 100 MG: 100 TABLET ORAL at 14:15

## 2022-02-20 RX ADMIN — SODIUM CHLORIDE, PRESERVATIVE FREE 10 ML: 5 INJECTION INTRAVENOUS at 20:23

## 2022-02-20 RX ADMIN — PIPERACILLIN AND TAZOBACTAM 4500 MG: 4; .5 INJECTION, POWDER, LYOPHILIZED, FOR SOLUTION INTRAVENOUS; PARENTERAL at 11:48

## 2022-02-20 RX ADMIN — Medication 1750 MG: at 12:35

## 2022-02-20 RX ADMIN — SODIUM CHLORIDE, PRESERVATIVE FREE 10 ML: 5 INJECTION INTRAVENOUS at 23:06

## 2022-02-20 RX ADMIN — PROMETHAZINE HYDROCHLORIDE 12.5 MG: 12.5 TABLET ORAL at 23:05

## 2022-02-20 ASSESSMENT — ENCOUNTER SYMPTOMS
NAUSEA: 1
VOMITING: 1
BACK PAIN: 0
SHORTNESS OF BREATH: 0
ABDOMINAL PAIN: 0

## 2022-02-20 ASSESSMENT — PAIN SCALES - GENERAL
PAINLEVEL_OUTOF10: 8
PAINLEVEL_OUTOF10: 6

## 2022-02-20 ASSESSMENT — PAIN DESCRIPTION - DESCRIPTORS: DESCRIPTORS: CRAMPING

## 2022-02-20 ASSESSMENT — PAIN DESCRIPTION - LOCATION: LOCATION: ABDOMEN

## 2022-02-20 NOTE — ED NOTES
Patient assited onto bedpan, tolerated well    The following labs were labeled with patient stickers & tubed to lab;    []Lavender   []On Ice  []Blue  [x]Green/ Yellow  [x]Green/ Black [x]On Ice  []Pink  [x]Red  []Yellow    []COVID-19 Swab []Rapid  []COVID-19 Swab []PCR    []Urine Sample  []Pelvic Cultures    [x]Blood Cultures x2 completed, sent to [address] [place] [address]  02/20/22 9100

## 2022-02-20 NOTE — ED NOTES
Verenice Albright MD at bedside for evaluation.       Daughter arrives at [facility]  02/20/22 3761

## 2022-02-20 NOTE — ED NOTES
Dr Jennifer Case at bedside speaking with patient at this time regarding radiology results   Patient remains resting on cart, calm  Nondistressed  GCS=15    Internal med paged, called back and spoke with writer  Internal med reports they will be down in ER for eval shortly and orders to be put in     Will continue to monitor  Call light in reach     Aisha León RN  02/20/22 [id number]

## 2022-02-20 NOTE — Clinical Note
Discharge Plan[de-identified] Home with Office Follow-up   Telemetry/Cardiac Monitoring Required?: Yes   Bed request comments: CHICO Lawrence

## 2022-02-20 NOTE — ED PROVIDER NOTES
Smoker     Packs/day: 1.00     Types: Cigarettes    Smokeless tobacco: Current User   Vaping Use    Vaping Use: Never used   Substance and Sexual Activity    Alcohol use: Not Currently    Drug use: Never    Sexual activity: Not on file   Other Topics Concern    Not on file   Social History Narrative    Not on file     Social Determinants of Health     Financial Resource Strain:     Difficulty of Paying Living Expenses: Not on file   Food Insecurity:     Worried About Running Out of Food in the Last Year: Not on file    Cierra of Food in the Last Year: Not on file   Transportation Needs:     Lack of Transportation (Medical): Not on file    Lack of Transportation (Non-Medical): Not on file   Physical Activity:     Days of Exercise per Week: Not on file    Minutes of Exercise per Session: Not on file   Stress:     Feeling of Stress : Not on file   Social Connections:     Frequency of Communication with Friends and Family: Not on file    Frequency of Social Gatherings with Friends and Family: Not on file    Attends Christian Services: Not on file    Active Member of [address] [place] [address] or Organizations: Not on file    Attends Club or Organization Meetings: Not on file    Marital Status: Not on file   Intimate Partner Violence:     Fear of Current or Ex-Partner: Not on file    Emotionally Abused: Not on file    Physically Abused: Not on file    Sexually Abused: Not on file   Housing Stability:     Unable to Pay for Housing in the Last Year: Not on file    Number of [place] in the Last Year: Not on file    Unstable Housing in the Last Year: Not on file       Family History   Problem Relation Age of Onset    Alcohol Abuse Mother     Cirrhosis Mother         Allergies:  Ceftriaxone    Home Medications:  Prior to Admission medications    Medication Sig Start Date End Date Taking?  Authorizing Provider   LORazepam (ATIVAN) 1 MG tablet Take 1 tablet by mouth every 8 hours as needed for Anxiety for up to 30 (2-9 systems for level 4, 10 or more for level 5)      Review of Systems   Constitutional: Positive for chills and fatigue. Negative for diaphoresis and fever. HENT: Negative for congestion. Eyes: Negative for visual disturbance. Respiratory: Negative for shortness of breath. Cardiovascular: Negative for chest pain. Gastrointestinal: Positive for nausea and vomiting. Negative for abdominal pain. Endocrine: Negative for polyuria. Genitourinary: Negative for dysuria. Musculoskeletal: Negative for back pain. Skin: Negative for wound. Neurological: Positive for weakness. Negative for headaches. Psychiatric/Behavioral: Negative for confusion. PHYSICAL EXAM   (up to 7 for level 4, 8 or more forlevel 5)      ED TRIAGE VITALS BP: (!) 178/84, Temp: 98.1 °F (36.7 °C), Pulse: 93, Resp: 17, SpO2: 97 %    Vitals:    02/20/22 1411 02/20/22 1416 02/20/22 1432 02/20/22 1455   BP: (!) 167/82 (!) 177/82 (!) 189/91    Pulse: 103 95 102 92   Resp: 13 12 15 14   Temp:       SpO2: 98% 98% 98% 98%       Physical Exam  Constitutional:       General: She is not in acute distress. Appearance: She is ill-appearing. HENT:      Head: Normocephalic and atraumatic. Nose: Nose normal.   Eyes:      Pupils: Pupils are equal, round, and reactive to light. Cardiovascular:      Rate and Rhythm: Regular rhythm. Tachycardia present. Heart sounds: No murmur heard. Pulmonary:      Effort: No respiratory distress. Abdominal:      Tenderness: There is no abdominal tenderness. Musculoskeletal:         General: No tenderness. Normal range of motion. Cervical back: Normal range of motion and neck supple. Skin:     General: Skin is warm and dry. Capillary Refill: Capillary refill takes less than 2 seconds. Findings: No erythema or rash. Neurological:      Mental Status: She is confused. GCS: GCS eye subscore is 4. GCS verbal subscore is 4. GCS motor subscore is 6.       Sensory: No sensory deficit. Deep Tendon Reflexes: Reflexes normal.   Psychiatric:         Behavior: Behavior normal.         DIFFERENTIAL  DIAGNOSIS     PLAN (LABS / IMAGING / EKG):  Orders Placed This Encounter   Procedures    Culture, Urine    Culture, Blood 2    Culture, Blood 1    CT Head WO Contrast    XR CHEST PORTABLE    CT CHEST PULMONARY EMBOLISM W CONTRAST    CBC with Auto Differential    Comprehensive Metabolic Panel w/ Reflex to MG    Lipase    Troponin    Urinalysis with Microscopic    Lactate, Sepsis    Lamotrigine Level    Levetiracetam Level    Troponin    Magnesium    Brain Natriuretic Peptide    Telemetry monitoring - 72 hour duration    Inpatient consult to Cardiology    Inpatient consult to Oncology    Inpatient consult to Internal Medicine    EKG 12 Lead    EKG 12 Lead    TYPE AND SCREEN    Saline lock IV    Insert peripheral IV    ADMIT TO INPATIENT       MEDICATIONS ORDERED:  Orders Placed This Encounter   Medications    ondansetron (ZOFRAN) injection 4 mg    0.9 % sodium chloride bolus    piperacillin-tazobactam (ZOSYN) 4,500 mg in dextrose 5 % 100 mL IVPB (mini-bag)     Order Specific Question:   Antimicrobial Indications     Answer: Other     Order Specific Question:   Other Abx Indication     Answer: Suspected Sepsis of Skin or Soft Tissue Origin    vancomycin (VANCOCIN) 1750 mg in sodium chloride 0.9 % 500 mL IVPB     Order Specific Question:   Antimicrobial Indications     Answer: Other     Order Specific Question:   Other Abx Indication     Answer:    Suspected Sepsis of Skin or Soft Tissue Origin    promethazine (PHENERGAN) injection 12.5 mg    lamoTRIgine (LAMICTAL) tablet 100 mg    iopamidol (ISOVUE-370) 76 % injection 75 mL       DDX:     Nausea secondary to chemotherapy, mets to the brain, altered mental status, infection, hypoxia, pulmonary embolism, mets to the lungs, electrolyte abnormality,    Initial MDM/Plan: 60 y.o. female who presents with nausea vomiting    History of active ovarian metastatic cancer  Getting chemo  Last day after chemo was 3 days ago  Here with fatigue, slight confusion history of seizure disorder on Keppra and Lamictal  Lamictal levels below threshold, given dose of Lipitor here in the emergency department  According to daughter patient had multiple seizures overnight, recorded with 90 can  CT head unremarkable for new metastasis  Admitting service informed and instructed to get neurology on for inpatient evaluation  Hypoxic on arrival started on 2 L of nasal cannula  CT chest unremarkable for pulmonary embolism however there was interstitial edema which could explain acute hypoxic state  Patient also has leukocytosis on arrival, hematology consulted, patient was on steroids however unable to rule out infectious source there is no pneumonia on x-ray there is no infection on urine however will cover patient with broad-spectrum antibiotics pending blood cultures  EKG unremarkable  Troponins up from baseline, uptrending  Cardiology consulted for evaluation, likely type II  Dehydration likely a component, IV fluids    Disposition:  Admitted to internal medicine    DIAGNOSTIC RESULTS / Consuelo Black COURSE / MDM     LABS:  Results for orders placed or performed during the hospital encounter of 02/20/22   Culture, Blood 2    Specimen: Blood   Result Value Ref Range    Specimen Description . BLOOD     Special Requests L HAND 2ML     Culture NO GROWTH <24 HRS    Culture, Blood 1    Specimen: Blood   Result Value Ref Range    Specimen Description . BLOOD     Special Requests R HAND 20ML     Culture NO GROWTH <24 HRS    CBC with Auto Differential   Result Value Ref Range    WBC 20.0 (H) 3.5 - 11.3 k/uL    RBC 4.08 3.95 - 5.11 m/uL    Hemoglobin 11.9 11.9 - 15.1 g/dL    Hematocrit 39.5 36.3 - 47.1 %    MCV 96.8 82.6 - 102.9 fL    MCH 29.2 25.2 - 33.5 pg    MCHC 30.1 28.4 - 34.8 g/dL    RDW 17.7 (H) 11.8 - 14.4 %    Platelets 147 (H) 100 - 453 k/uL    MPV 9.3 8.1 - 13.5 fL    NRBC Automated 0.0 0.0 per 100 WBC    Differential Type NOT REPORTED     WBC Morphology NOT REPORTED     RBC Morphology NOT REPORTED     Platelet Estimate NOT REPORTED     Immature Granulocytes 0 0 %    Seg Neutrophils 88 (H) 36 - 66 %    Lymphocytes 1 (L) 24 - 44 %    Monocytes 11 (H) 1 - 7 %    Eosinophils % 0 (L) 1 - 4 %    Basophils 0 0 - 2 %    Absolute Immature Granulocyte 0.00 0.00 - 0.30 k/uL    Segs Absolute 17.60 (H) 1.8 - 7.7 k/uL    Absolute Lymph # 0.20 (L) 1.0 - 4.8 k/uL    Absolute Mono # 2.20 (H) 0.1 - 0.8 k/uL    Absolute Eos # 0.00 0.0 - 0.4 k/uL    Basophils Absolute 0.00 0.0 - 0.2 k/uL    Morphology ANISOCYTOSIS PRESENT    Comprehensive Metabolic Panel w/ Reflex to MG   Result Value Ref Range    Glucose 150 (H) 70 - 99 mg/dL    BUN 13 6 - 20 mg/dL    CREATININE 0.47 (L) 0.50 - 0.90 mg/dL    Bun/Cre Ratio NOT REPORTED 9 - 20    Calcium 9.0 8.6 - 10.4 mg/dL    Sodium 142 135 - 144 mmol/L    Potassium 3.4 (L) 3.7 - 5.3 mmol/L    Chloride 103 98 - 107 mmol/L    CO2 22 20 - 31 mmol/L    Anion Gap 17 9 - 17 mmol/L    Alkaline Phosphatase 65 35 - 104 U/L    ALT 7 5 - 33 U/L    AST 12 <32 U/L    Total Bilirubin 0.16 (L) 0.3 - 1.2 mg/dL    Total Protein 6.7 6.4 - 8.3 g/dL    Albumin 4.2 3.5 - 5.2 g/dL    Albumin/Globulin Ratio 1.7 1.0 - 2.5    GFR Non-African American >60 >60 mL/min    GFR African American >60 >60 mL/min    GFR Comment          GFR Staging NOT REPORTED    Lipase   Result Value Ref Range    Lipase 13 13 - 60 U/L   Troponin   Result Value Ref Range    Troponin, High Sensitivity 51 (H) 0 - 14 ng/L    Troponin T NOT REPORTED <0.03 ng/mL    Troponin Interp NOT REPORTED    Urinalysis with Microscopic   Result Value Ref Range    Color, UA Yellow Yellow    Turbidity UA Turbid (A) Clear    Glucose, Ur 2+ (A) NEGATIVE    Bilirubin Urine NEGATIVE NEGATIVE    Ketones, Urine NEGATIVE NEGATIVE    Specific Gravity, UA 1.017 1.005 - 1.030    Urine Hgb NEGATIVE NEGATIVE    pH, UA 5.5 5.0 - 8.0    Protein, UA NEGATIVE NEGATIVE    Urobilinogen, Urine Normal Normal    Nitrite, Urine NEGATIVE NEGATIVE    Leukocyte Esterase, Urine NEGATIVE NEGATIVE    -          WBC, UA None 0 - 5 /HPF    RBC, UA 0 TO 2 0 - 4 /HPF    Casts UA NOT REPORTED 0 - 8 /LPF    Crystals, UA NOT REPORTED None /HPF    Epithelial Cells UA 0 TO 2 0 - 5 /HPF    Renal Epithelial, UA NOT REPORTED 0 /HPF    Bacteria, UA NOT REPORTED None    Mucus, UA NOT REPORTED None    Trichomonas, UA NOT REPORTED None    Amorphous, UA NOT REPORTED None    Other Observations UA NOT REPORTED NOT REQ. Yeast, UA NOT REPORTED None   Lactate, Sepsis   Result Value Ref Range    Lactic Acid, Sepsis NOT REPORTED 0.5 - 1.9 mmol/L    Lactic Acid, Sepsis, Whole Blood 2.8 (H) 0.5 - 1.9 mmol/L   Lactate, Sepsis   Result Value Ref Range    Lactic Acid, Sepsis NOT REPORTED 0.5 - 1.9 mmol/L    Lactic Acid, Sepsis, Whole Blood 1.5 0.5 - 1.9 mmol/L   Lamotrigine Level   Result Value Ref Range    Lamotrigine Lvl 1.3 (L) 3.0 - 15.0 ug/mL   Levetiracetam Level   Result Value Ref Range    Levetiracetam Lvl 22 ug/mL   Troponin   Result Value Ref Range    Troponin, High Sensitivity 57 (HH) 0 - 14 ng/L    Troponin T NOT REPORTED <0.03 ng/mL    Troponin Interp NOT REPORTED    Magnesium   Result Value Ref Range    Magnesium 1.8 1.6 - 2.6 mg/dL   Brain Natriuretic Peptide   Result Value Ref Range    Pro- (H) <300 pg/mL    BNP Interpretation NOT REPORTED        RADIOLOGY:  CT CHEST PULMONARY EMBOLISM W CONTRAST   Final Result   1. No evidence for acute pulmonary embolism. 2.  Bilateral pulmonary nodules, thoracic lymphadenopathy, soft tissue   calcifications in the chest and upper abdomen and sclerotic bone lesions   again demonstrated, as described previously, which may be related to the   patient's history of malignancy. 3.  Small pericardial effusion. Trace left pleural effusion.       4.  Mild septal thickening suggestive of interstitial edema. CT Head WO Contrast   Preliminary Result   No acute intracranial abnormality. XR CHEST PORTABLE   Final Result   Chronic findings in the chest without acute airspace disease identified. EKG  No ST segment elevations or depressions seen on EKG    All EKG's are interpreted by the Emergency Department Physicianwho either signs or Co-signs this chart in the absence of a cardiologist.    EMERGENCY DEPARTMENT COURSE:  ED Course as of 02/20/22 1516   Sun Feb 20, 2022   1007 Patient seen and assessed in the emergency department no acute respiratory cardiovascular distress. Patient here with nausea vomiting has history of ovarian cancer with mets, states that she does follow oncology last chemotherapy was a week ago, states this is her usual nausea and vomiting, however she does appear more somnolent, has no focal deficits states she has no headache but feels really weak. Denies fevers or chills traumatic injuries, abdominal pain . Denies chest pain or shortness of breath. Denies rashes, fevers, pelvic symptoms, vaginal symptoms. Has dysuria back pain. [PS]   1046 WBC(!): 20.0 [PS]   1046 Pulse: 97 [PS]   1051 Pulse: 100 [PS]   1105 Troponin, High Sensitivity(!): 51 [PS]   1148 Lactic Acid, Sepsis, Whole Blood(!): 2.8 [PS]   1203 Lamotrigine Lvl(!): 1.3 [PS]   1203 SpO2(S): 90 %  Desat  On 2L NC  Eval for PE  On eliquis 5mg  Active cancer   [PS]   1207 Creatinine(!): 0.47 [PS]   1207 Potassium(!): 3.4 [PS]   1239 Troponin, High Sensitivity(!!): 57 [PS]   1243 D/w cards [PS]   0493 D/w hematology   [PS]   9973 Discussed with the daughter who told me that there is a camera in her room while she sleeps and recorded multiple events of seizure activity throughout the night there is some question as patient has not been taking her medication appropriately.  [PS]   1320 Lactic Acid, Sepsis, Whole Blood: 1.5 [PS]      ED Course User Index  [PS] Matt Means MD PROCEDURES:  None    CONSULTS:  IP CONSULT TO CARDIOLOGY  IP CONSULT TO ONCOLOGY  IP CONSULT TO INTERNAL MEDICINE    CRITICAL CARE:  Please see attending note    FINAL IMPRESSION      1. Confusion    2. Fatigue, unspecified type    3. Dehydration    4. Hypoxia          DISPOSITION / PLAN     DISPOSITION Admitted 02/20/2022 02:36:16 PM       PATIENT REFERRED TO:  No follow-up provider specified.     DISCHARGE MEDICATIONS:  New Prescriptions    No medications on file       Loyda Veronica MD  Emergency Medicine Resident    (Please note that portions of this note were completed with a voice recognition program.Efforts were made to edit the dictations but occasionally words are mis-transcribed.)       Loyda Veronica MD  Resident  02/20/22 2533

## 2022-02-20 NOTE — ED PROVIDER NOTES
[facility] ED  eMERGENCY dEPARTMENT eNCOUnter   Attending Attestation     Pt Name: Acacia Brannon  MRN: [id number]  Álvarogfdebra 1965  Date of evaluation: 2/20/22       Acacia Brannon is a 60 y.o. female who presents with No chief complaint on file. History: Patient presents with nausea vomiting and fatigue. Patient has history of cancer. Patient says she has been feeling confused as well. Exam: Heart rate and rhythm are regular. Lungs are clear to auscultation bilaterally. Abdomen is soft, nontender. Patient is awake and alert and acting appropriately. Concern for metastasis. Will get labs and CT of the head. We will treat the patient's nausea and vomiting. Plan for admission. EKG shows normal sinus rhythm with a rate of 90 beats minute. Normal axis. No ST elevation or depression. No blocks arrhythmias. T waves are high. Nonspecific EKG    I performed a history and physical examination of the patient and discussed management with the resident. I reviewed the residents note and agree with the documented findings and plan of care. Any areas of disagreement are noted on the chart. I was personally present for the key portions of any procedures. I have documented in the chart those procedures where I was not present during the key portions. I have personally reviewed all images and agree with the resident's interpretation. I have reviewed the emergency nurses triage note. I agree with the chief complaint, past medical history, past surgical history, allergies, medications, social and family history as documented unless otherwise noted below. Documentation of the HPI, Physical Exam and Medical Decision Making performed by medical students or scribes is based on my personal performance of the HPI, PE and MDM.  For Phys Assistant/ Nurse Practitioner cases/documentation I have had a face to face evaluation of this patient and have completed at least one if not all key

## 2022-02-20 NOTE — ED NOTES
Patient into er per ems from home today for N/V/Weakness  Patient currently being treated for ovarian cancer and receiving chemo tx's  Patient appears weak, able to follow commands, slow to respond    Placed on cardiac monitor  EKG completed  IV established, blood labs collected    GCS=15     Nondistressed, VS STABLE  Patient updated on plan of care and processes  Denies complaints     [address] [facility] Lizz Beckwith RN  02/20/22 1652

## 2022-02-20 NOTE — ED NOTES
Bed: 28  Expected date:   Expected time:   Means of arrival:   Comments:  [facility] JAYA  02/20/22 9224

## 2022-02-20 NOTE — ED NOTES
Assisted patient onto bed pan to urinate, tolerated well  Urine collected, sent to lab  Blankets applied for comfort    Patient states that she continues to have +Nausea     Bishop Shelby RN  02/20/22 3090

## 2022-02-20 NOTE — ED NOTES
Patient assisted onto bed pan, states that she needs to have BM  Pt remains resting on cart, appears diaphoretic, more alert at this time  Franca wick removed while using bed pan  Patient requests to sit on bedpan for a little bit    Call light in reach  Will check in on patient in a few moments    Patients son calls for update, speaks with son     Esaw Kussmaul, RN  02/20/22 6256

## 2022-02-20 NOTE — ED NOTES
Franca wick applied to patient for comfort and frequent urination  Full, clean linen set applied for comfort  Repositioned on cart for comfort  Family member remains present at bedside  Call light in reach    VSS  Will continue to monitor and update  Awaiting room assignment for admission       Eileen Mcfadden RN  02/20/22 7181

## 2022-02-20 NOTE — ED NOTES
Cool washcloth placed on patients forehead for comfort  Updated that we continue to wait for room assignment   VSS, charted  Water provided for comfort    Call light in place, will continue to monitor       Maurice Dowd RN  02/20/22 3825

## 2022-02-20 NOTE — ED NOTES
Patient assisted off bedpan  Daughter remains present at bedside with patient  VSS    Call light in reach  Will continue to monitor      Rivka Vaca RN  02/20/22 3337

## 2022-02-21 ENCOUNTER — APPOINTMENT (OUTPATIENT)
Dept: INTERVENTIONAL RADIOLOGY/VASCULAR | Age: 57
DRG: 388 | End: 2022-02-21
Payer: COMMERCIAL

## 2022-02-21 PROBLEM — E87.6 HYPOKALEMIA: Status: ACTIVE | Noted: 2022-02-21

## 2022-02-21 PROBLEM — J96.01 ACUTE RESPIRATORY FAILURE WITH HYPOXIA (HCC): Status: ACTIVE | Noted: 2022-02-21

## 2022-02-21 PROBLEM — E11.9 DIABETES MELLITUS (HCC): Status: ACTIVE | Noted: 2022-02-21

## 2022-02-21 PROBLEM — I82.512 CHRONIC EMBOLISM AND THROMBOSIS OF LEFT FEMORAL VEIN (HCC): Status: ACTIVE | Noted: 2022-02-02

## 2022-02-21 PROBLEM — K56.7 ILEUS (HCC): Status: ACTIVE | Noted: 2022-02-21

## 2022-02-21 PROBLEM — F33.41 RECURRENT MAJOR DEPRESSIVE DISORDER, IN PARTIAL REMISSION (HCC): Status: ACTIVE | Noted: 2022-01-08

## 2022-02-21 PROBLEM — R79.89 ELEVATED TROPONIN: Status: ACTIVE | Noted: 2022-02-21

## 2022-02-21 PROBLEM — E87.20 LACTIC ACIDOSIS: Status: ACTIVE | Noted: 2022-02-21

## 2022-02-21 PROBLEM — F41.9 ANXIETY: Status: ACTIVE | Noted: 2022-01-08

## 2022-02-21 PROBLEM — I31.39 PERICARDIAL EFFUSION: Status: ACTIVE | Noted: 2022-02-21

## 2022-02-21 PROBLEM — K21.9 GASTROESOPHAGEAL REFLUX DISEASE: Status: ACTIVE | Noted: 2022-01-08

## 2022-02-21 PROBLEM — R77.8 ELEVATED TROPONIN: Status: ACTIVE | Noted: 2022-02-21

## 2022-02-21 LAB
ABSOLUTE EOS #: <0.03 K/UL (ref 0–0.44)
ABSOLUTE IMMATURE GRANULOCYTE: 0.08 K/UL (ref 0–0.3)
ABSOLUTE LYMPH #: 0.78 K/UL (ref 1.1–3.7)
ABSOLUTE MONO #: 0.38 K/UL (ref 0.1–1.2)
AMPHETAMINE SCREEN URINE: NEGATIVE
ANION GAP SERPL CALCULATED.3IONS-SCNC: 12 MMOL/L (ref 9–17)
BARBITURATE SCREEN URINE: NEGATIVE
BASOPHILS # BLD: 0 % (ref 0–2)
BASOPHILS ABSOLUTE: 0.05 K/UL (ref 0–0.2)
BENZODIAZEPINE SCREEN, URINE: NEGATIVE
BUN BLDV-MCNC: 8 MG/DL (ref 6–20)
BUN/CREAT BLD: ABNORMAL (ref 9–20)
BUPRENORPHINE URINE: ABNORMAL
CALCIUM SERPL-MCNC: 8.9 MG/DL (ref 8.6–10.4)
CANNABINOID SCREEN URINE: NEGATIVE
CHLORIDE BLD-SCNC: 103 MMOL/L (ref 98–107)
CO2: 22 MMOL/L (ref 20–31)
COCAINE METABOLITE, URINE: NEGATIVE
CREAT SERPL-MCNC: 0.39 MG/DL (ref 0.5–0.9)
DIFFERENTIAL TYPE: ABNORMAL
EKG ATRIAL RATE: 90 BPM
EKG ATRIAL RATE: 99 BPM
EKG P AXIS: 45 DEGREES
EKG P AXIS: 49 DEGREES
EKG P-R INTERVAL: 162 MS
EKG P-R INTERVAL: 172 MS
EKG Q-T INTERVAL: 364 MS
EKG Q-T INTERVAL: 386 MS
EKG QRS DURATION: 90 MS
EKG QRS DURATION: 92 MS
EKG QTC CALCULATION (BAZETT): 467 MS
EKG QTC CALCULATION (BAZETT): 472 MS
EKG R AXIS: -5 DEGREES
EKG R AXIS: 87 DEGREES
EKG T AXIS: 36 DEGREES
EKG T AXIS: 55 DEGREES
EKG VENTRICULAR RATE: 90 BPM
EKG VENTRICULAR RATE: 99 BPM
EOSINOPHILS RELATIVE PERCENT: 0 % (ref 1–4)
GFR AFRICAN AMERICAN: >60 ML/MIN
GFR NON-AFRICAN AMERICAN: >60 ML/MIN
GFR SERPL CREATININE-BSD FRML MDRD: ABNORMAL ML/MIN/{1.73_M2}
GFR SERPL CREATININE-BSD FRML MDRD: ABNORMAL ML/MIN/{1.73_M2}
GLUCOSE BLD-MCNC: 109 MG/DL (ref 70–99)
HCT VFR BLD CALC: 33.7 % (ref 36.3–47.1)
HEMOGLOBIN: 10.5 G/DL (ref 11.9–15.1)
IMMATURE GRANULOCYTES: 1 %
LYMPHOCYTES # BLD: 6 % (ref 24–43)
MCH RBC QN AUTO: 29.5 PG (ref 25.2–33.5)
MCHC RBC AUTO-ENTMCNC: 31.2 G/DL (ref 28.4–34.8)
MCV RBC AUTO: 94.7 FL (ref 82.6–102.9)
MDMA URINE: ABNORMAL
METHADONE SCREEN, URINE: NEGATIVE
METHAMPHETAMINE, URINE: ABNORMAL
MONOCYTES # BLD: 3 % (ref 3–12)
MYOGLOBIN: 62 NG/ML (ref 25–58)
NRBC AUTOMATED: 0 PER 100 WBC
OPIATES, URINE: POSITIVE
OXYCODONE SCREEN URINE: NEGATIVE
PDW BLD-RTO: 17.4 % (ref 11.8–14.4)
PHENCYCLIDINE, URINE: NEGATIVE
PLATELET # BLD: 609 K/UL (ref 138–453)
PLATELET ESTIMATE: ABNORMAL
PMV BLD AUTO: 9.4 FL (ref 8.1–13.5)
POTASSIUM SERPL-SCNC: 3.6 MMOL/L (ref 3.7–5.3)
PROCALCITONIN: 0.25 NG/ML
PROPOXYPHENE, URINE: ABNORMAL
RBC # BLD: 3.56 M/UL (ref 3.95–5.11)
RBC # BLD: ABNORMAL 10*6/UL
SEG NEUTROPHILS: 91 % (ref 36–65)
SEGMENTED NEUTROPHILS ABSOLUTE COUNT: 12.77 K/UL (ref 1.5–8.1)
SODIUM BLD-SCNC: 137 MMOL/L (ref 135–144)
TEST INFORMATION: ABNORMAL
TOTAL CK: 160 U/L (ref 26–192)
TRICYCLIC ANTIDEPRESSANTS, UR: ABNORMAL
TROPONIN INTERP: ABNORMAL
TROPONIN INTERP: NORMAL
TROPONIN T: ABNORMAL NG/ML
TROPONIN T: NORMAL NG/ML
TROPONIN, HIGH SENSITIVITY: 14 NG/L (ref 0–14)
TROPONIN, HIGH SENSITIVITY: 16 NG/L (ref 0–14)
WBC # BLD: 14.1 K/UL (ref 3.5–11.3)
WBC # BLD: ABNORMAL 10*3/UL

## 2022-02-21 PROCEDURE — 93010 ELECTROCARDIOGRAM REPORT: CPT | Performed by: INTERNAL MEDICINE

## 2022-02-21 PROCEDURE — 84484 ASSAY OF TROPONIN QUANT: CPT

## 2022-02-21 PROCEDURE — 43752 NASAL/OROGASTRIC W/TUBE PLMT: CPT

## 2022-02-21 PROCEDURE — 99255 IP/OBS CONSLTJ NEW/EST HI 80: CPT | Performed by: INTERNAL MEDICINE

## 2022-02-21 PROCEDURE — 1200000000 HC SEMI PRIVATE

## 2022-02-21 PROCEDURE — 99254 IP/OBS CNSLTJ NEW/EST MOD 60: CPT | Performed by: PSYCHIATRY & NEUROLOGY

## 2022-02-21 PROCEDURE — 83874 ASSAY OF MYOGLOBIN: CPT

## 2022-02-21 PROCEDURE — 80048 BASIC METABOLIC PNL TOTAL CA: CPT

## 2022-02-21 PROCEDURE — 82550 ASSAY OF CK (CPK): CPT

## 2022-02-21 PROCEDURE — 84145 PROCALCITONIN (PCT): CPT

## 2022-02-21 PROCEDURE — 0DH67UZ INSERTION OF FEEDING DEVICE INTO STOMACH, VIA NATURAL OR ARTIFICIAL OPENING: ICD-10-PCS | Performed by: STUDENT IN AN ORGANIZED HEALTH CARE EDUCATION/TRAINING PROGRAM

## 2022-02-21 PROCEDURE — 6370000000 HC RX 637 (ALT 250 FOR IP): Performed by: STUDENT IN AN ORGANIZED HEALTH CARE EDUCATION/TRAINING PROGRAM

## 2022-02-21 PROCEDURE — 85025 COMPLETE CBC W/AUTO DIFF WBC: CPT

## 2022-02-21 PROCEDURE — 36415 COLL VENOUS BLD VENIPUNCTURE: CPT

## 2022-02-21 PROCEDURE — C9113 INJ PANTOPRAZOLE SODIUM, VIA: HCPCS | Performed by: STUDENT IN AN ORGANIZED HEALTH CARE EDUCATION/TRAINING PROGRAM

## 2022-02-21 PROCEDURE — 6360000002 HC RX W HCPCS: Performed by: STUDENT IN AN ORGANIZED HEALTH CARE EDUCATION/TRAINING PROGRAM

## 2022-02-21 PROCEDURE — 2709999900 HC NON-CHARGEABLE SUPPLY

## 2022-02-21 PROCEDURE — 2580000003 HC RX 258: Performed by: STUDENT IN AN ORGANIZED HEALTH CARE EDUCATION/TRAINING PROGRAM

## 2022-02-21 PROCEDURE — 80307 DRUG TEST PRSMV CHEM ANLYZR: CPT

## 2022-02-21 PROCEDURE — 99232 SBSQ HOSP IP/OBS MODERATE 35: CPT | Performed by: INTERNAL MEDICINE

## 2022-02-21 RX ORDER — ALBUTEROL SULFATE 2.5 MG/3ML
2.5 SOLUTION RESPIRATORY (INHALATION)
Status: DISCONTINUED | OUTPATIENT
Start: 2022-02-21 | End: 2022-02-27

## 2022-02-21 RX ORDER — OXYMETAZOLINE HYDROCHLORIDE 0.05 G/100ML
2 SPRAY NASAL 2 TIMES DAILY
Status: DISCONTINUED | OUTPATIENT
Start: 2022-02-21 | End: 2022-02-23

## 2022-02-21 RX ORDER — LORAZEPAM 2 MG/ML
0.5 INJECTION INTRAMUSCULAR
Status: COMPLETED | OUTPATIENT
Start: 2022-02-21 | End: 2022-02-22

## 2022-02-21 RX ORDER — BISACODYL 10 MG
10 SUPPOSITORY, RECTAL RECTAL DAILY PRN
Status: DISCONTINUED | OUTPATIENT
Start: 2022-02-21 | End: 2022-02-28 | Stop reason: HOSPADM

## 2022-02-21 RX ORDER — LORAZEPAM 1 MG/1
1 TABLET ORAL EVERY 8 HOURS PRN
Status: DISCONTINUED | OUTPATIENT
Start: 2022-02-21 | End: 2022-02-21

## 2022-02-21 RX ADMIN — MORPHINE SULFATE 1 MG: 4 INJECTION INTRAVENOUS at 04:41

## 2022-02-21 RX ADMIN — MORPHINE SULFATE 1 MG: 4 INJECTION INTRAVENOUS at 18:23

## 2022-02-21 RX ADMIN — MORPHINE SULFATE 1 MG: 4 INJECTION INTRAVENOUS at 14:21

## 2022-02-21 RX ADMIN — AMPICILLIN AND SULBACTAM 3000 MG: 1; 2 INJECTION, POWDER, FOR SOLUTION INTRAMUSCULAR; INTRAVENOUS at 16:54

## 2022-02-21 RX ADMIN — ENOXAPARIN SODIUM 40 MG: 100 INJECTION SUBCUTANEOUS at 22:30

## 2022-02-21 RX ADMIN — POTASSIUM CHLORIDE 40 MEQ: 7.46 INJECTION, SOLUTION INTRAVENOUS at 02:06

## 2022-02-21 RX ADMIN — SODIUM CHLORIDE, PRESERVATIVE FREE 10 ML: 5 INJECTION INTRAVENOUS at 10:10

## 2022-02-21 RX ADMIN — LEVETIRACETAM 1500 MG: 15 INJECTION INTRAVENOUS at 23:27

## 2022-02-21 RX ADMIN — LEVETIRACETAM 1500 MG: 15 INJECTION INTRAVENOUS at 12:13

## 2022-02-21 RX ADMIN — ONDANSETRON 4 MG: 2 INJECTION INTRAMUSCULAR; INTRAVENOUS at 10:13

## 2022-02-21 RX ADMIN — POTASSIUM CHLORIDE 40 MEQ: 7.46 INJECTION, SOLUTION INTRAVENOUS at 06:44

## 2022-02-21 RX ADMIN — ONDANSETRON 4 MG: 2 INJECTION INTRAMUSCULAR; INTRAVENOUS at 04:11

## 2022-02-21 RX ADMIN — Medication 2 SPRAY: at 10:13

## 2022-02-21 RX ADMIN — PANTOPRAZOLE SODIUM 40 MG: 40 INJECTION, POWDER, FOR SOLUTION INTRAVENOUS at 10:03

## 2022-02-21 RX ADMIN — ONDANSETRON 4 MG: 2 INJECTION INTRAMUSCULAR; INTRAVENOUS at 16:50

## 2022-02-21 RX ADMIN — SODIUM CHLORIDE: 9 INJECTION, SOLUTION INTRAVENOUS at 14:23

## 2022-02-21 RX ADMIN — MORPHINE SULFATE 1 MG: 4 INJECTION INTRAVENOUS at 22:25

## 2022-02-21 RX ADMIN — AMPICILLIN AND SULBACTAM 3000 MG: 1; 2 INJECTION, POWDER, FOR SOLUTION INTRAMUSCULAR; INTRAVENOUS at 23:52

## 2022-02-21 RX ADMIN — ONDANSETRON 4 MG: 2 INJECTION INTRAMUSCULAR; INTRAVENOUS at 22:25

## 2022-02-21 RX ADMIN — AMPICILLIN AND SULBACTAM 3000 MG: 1; 2 INJECTION, POWDER, FOR SOLUTION INTRAMUSCULAR; INTRAVENOUS at 10:10

## 2022-02-21 RX ADMIN — MORPHINE SULFATE 1 MG: 4 INJECTION INTRAVENOUS at 10:03

## 2022-02-21 ASSESSMENT — PAIN DESCRIPTION - PAIN TYPE: TYPE: ACUTE PAIN

## 2022-02-21 ASSESSMENT — PAIN SCALES - GENERAL
PAINLEVEL_OUTOF10: 8
PAINLEVEL_OUTOF10: 7

## 2022-02-21 ASSESSMENT — PAIN DESCRIPTION - ORIENTATION: ORIENTATION: RIGHT;LEFT;MID

## 2022-02-21 ASSESSMENT — PAIN DESCRIPTION - PROGRESSION
CLINICAL_PROGRESSION: NOT CHANGED

## 2022-02-21 ASSESSMENT — PAIN DESCRIPTION - DESCRIPTORS: DESCRIPTORS: CRAMPING

## 2022-02-21 ASSESSMENT — PAIN DESCRIPTION - ONSET: ONSET: ON-GOING

## 2022-02-21 ASSESSMENT — PAIN DESCRIPTION - LOCATION: LOCATION: ABDOMEN

## 2022-02-21 ASSESSMENT — PAIN DESCRIPTION - FREQUENCY: FREQUENCY: CONTINUOUS

## 2022-02-21 NOTE — PLAN OF CARE
Problem: Pain:  Goal: Pain level will decrease  Description: Pain level will decrease  2/21/2022 1749 by Ld Benjamin RN  Outcome: Ongoing  2/21/2022 0426 by Robert Cortes RN  Outcome: Ongoing  Goal: Control of acute pain  Description: Control of acute pain  2/21/2022 1749 by Ld Benjamin RN  Outcome: Ongoing  2/21/2022 0426 by Robert Cortes RN  Outcome: Ongoing  Goal: Control of chronic pain  Description: Control of chronic pain  2/21/2022 1749 by Ld Benjamin RN  Outcome: Ongoing  2/21/2022 0426 by Robert Cortes RN  Outcome: Ongoing     Problem: Nausea/Vomiting:  Goal: Absence of nausea/vomiting  Description: Absence of nausea/vomiting  2/21/2022 1749 by Ld Benjamin RN  Outcome: Ongoing  2/21/2022 0426 by Robert Cortes RN  Outcome: Ongoing  Goal: Able to drink  Description: Able to drink  2/21/2022 1749 by Ld Benjamin RN  Outcome: Ongoing  2/21/2022 0426 by Robert Cortes RN  Outcome: Ongoing  Goal: Able to eat  Description: Able to eat  2/21/2022 1749 by Ld Benjamin RN  Outcome: Ongoing  2/21/2022 0426 by Robert Cortes RN  Outcome: Ongoing  Goal: Ability to achieve adequate nutritional intake will improve  Description: Ability to achieve adequate nutritional intake will improve  2/21/2022 1749 by Ld Benjamin RN  Outcome: Ongoing  2/21/2022 0426 by Robert Cortes RN  Outcome: Ongoing     Problem: Infection - Central Venous Catheter-Associated Bloodstream Infection:  Goal: Will show no infection signs and symptoms  Description: Will show no infection signs and symptoms  2/21/2022 1749 by Ld Benjamin RN  Outcome: Ongoing  2/21/2022 0426 by Robert Cortes RN  Outcome: Ongoing

## 2022-02-21 NOTE — BRIEF OP NOTE
Brief Postoperative Note for NGT placement    [facility]  YOB: 1965  [id number]    Pre-operative Diagnosis: illeus    Post-operative Diagnosis: Same    Procedure: Fluoroscopy Guided NGT Placement     Anesthesia: None     Surgeons/Assistants: Nils Anderson PA-C    Complications: None    EBL: None    Specimens: Were Not Obtained    Description:    Successful placement of 12 Portuguese [place] sump  nasogastric tube through the left nostril. Both nostrils attempted with 16 fr tube without success. Nasogatric tube secured at 74 cm. Placement confirmed with administration of air bolus under fluoroscopy. Okay to use NGT.     Electronically signed by JOANN Soria on 2/21/2022 at 8:36 AM

## 2022-02-21 NOTE — PROGRESS NOTES
_      Chief Complaint   Patient presents with    Follow-up    Abdominal Pain    Nausea     intermittent    Fatigue     DIAGNOSIS:       Recurrent ovarian cancer. Original diagnosis 2005 with multiple recurrences. Diffuse metastasis. CURRENT THERAPY:         Doxil/ Avastin started 9/18/2020. Avastin was discontinued due to recent GI bleeding. Status post recent vascular embolization  S/p seizure disorder. Gemzar started 2/26/2021. Interrupted due to hospitalization and problem with compliance. BRIEF CASE HISTORY:      Ms. Joana Eldridge is a very pleasant 60 y.o. female with history of multiple co morbidities as listed. The patient seen in consultation for ovarian cancer with multiple recurrences. 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 She had surgical debulking and she had systemic treatment with Taxol and carboplatin for 6 cycles. Patient was in remission for about 2 years. She had a relapse in 2007 and treated with topotecan with good results. Patient relapsed again in 2008 and was treated with Taxol carboplatin. After 3 cycles of systemic chemotherapy she had problems with carboplatin so she finished 3 more cycles with Taxol. She did well again for 2 to 3 years until she had a relapse in 2012 and she had intraperitoneal chemotherapy treatment with Taxol. Patient was last seen by her oncologist in 2014 at which time she had relatively stable disease with normal tumor marker and no significant abnormal images. Patient moved to [place] after that and she was lost for oncology follow-ups. Patient was recently evaluated again here in [facility] because of abdominal discomfort. Re imaging confirmed metastatic relapse. Biopsy confirmed ovarian cancer recurrence. Scan showed extensive intraabdominal and splenic involvement and lung mets.  She has no symptoms at the present time. Patient denies smoking or alcohol drinking.l    INTERIM HISTORY:    patient is seen for follow up recurrent ovarian cancer. Started on Gemzar. Tolerated well. No melena or hematochezia. No hematemesis. Currently stable. No recent seizure activities. Labs are stable as well. No severe anemia. No active bleeding. Chemotherapy is tolerated fairly well. PAST MEDICAL HISTORY: has a past medical history of Anemia, Bleeding, Cervical cancer ([facility] [address] Depression, Diabetes mellitus ([facility] [address] GERD (gastroesophageal reflux disease), Hx of blood clots, Hypertension, Metastatic cancer ([facility] [address] Ovarian cancer ([facility] [address] Post chemo evaluation, and Splenic lesion. PAST SURGICAL HISTORY: has a past surgical history that includes Hysterectomy, total abdominal; Port Surgery; Tonsillectomy; IR PORT PLACEMENT > 5 YEARS (08/24/2020); Anus surgery; Abscess Drainage (2013); colectomy (03/2013); IR EMBOLIZATION HEMORRHAGE (10/05/2020); and Cardiac catheterization. CURRENT MEDICATIONS:  has a current medication list which includes the following prescription(s): lorazepam, polyethylene glycol, lamotrigine, levetiracetam, morphine, morphine, oxycodone-acetaminophen, pantoprazole, ondansetron, ferrous sulfate, morphine, sertraline, eliquis, diazepam, and naloxegol, and the following Facility-Administered Medications: sodium chloride flush, sodium chloride flush, sodium chloride, ondansetron **OR** [DISCONTINUED] ondansetron, sodium chloride flush, sodium chloride flush, sodium chloride, polyethylene glycol, acetaminophen **OR** acetaminophen, sodium chloride, potassium chloride **OR** potassium bicarb-citric acid **OR** potassium chloride, [START ON 2/21/2022] lamotrigine, levetiracetam, morphine (pf), [START ON 2/21/2022] pantoprazole **AND** [START ON 2/21/2022] sodium chloride (pf), promethazine **OR** ondansetron, apixaban, labetalol, and potassium chloridemagnesium sulfate.     ALLERGIES:  is allergic to ceftriaxone. FAMILY HISTORY: Negative for any hematological or oncological conditions. SOCIAL HISTORY:  reports that she has been smoking cigarettes. She has been smoking about 1.00 pack per day. She uses smokeless tobacco. She reports previous alcohol use. She reports that she does not use drugs. REVIEW OF SYSTEMS:     · General: No weakness or fatigue. No unanticipated weight loss or decreased appetite. No fever or chills. · Eyes: No blurred vision, eye pain or double vision. · Ears: No hearing problems or drainage. No tinnitus. · Throat: No sore throat, problems with swallowing or dysphagia. · Respiratory: No cough, sputum or hemoptysis. No shortness of breath. No pleuritic chest pain. · Cardiovascular: No chest pain, orthopnea or PND. No lower extremity edema. No palpitation. · Gastrointestinal: As above. · Genitourinary: No dysuria, hematuria, frequency or urgency. · Musculoskeletal: No muscle aches or pains. No limitation of movement. No back pain. No gait disturbance, No joint complaints. · Dermatologic: No skin rashes or pruritus. No skin lesions or discolorations. · Psychiatric: No depression, anxiety, or stress or signs of schizophrenia. No change in mood or affect. · Hematologic: No history of bleeding tendency. No bruises or ecchymosis. No history of clotting problems. · Infectious disease: No fever, chills or frequent infections. · Endocrine: No polydipsia or polyuria. No temperature intolerance. · Neurologic: No headaches or dizziness. No weakness or numbness of the extremities. No changes in balance, coordination,  memory, mentation, behavior. · Allergic/Immunologic: No nasal congestion or hives. No repeated infections. PHYSICAL EXAM:  The patient is not in acute distress. Vital signs: Blood pressure 135/76, pulse 87, temperature 97.8 °F (36.6 °C), temperature source Oral, resp. rate 16, weight 164 lb 8 oz (74.6 kg).      General appearance - well appearing, not in pain or distress  Mental status - good mood, alert and oriented  Eyes - pupils equal and reactive, extraocular eye movements intact  Ears - bilateral TM's and external ear canals normal  Nose - normal and patent, no erythema, discharge or polyps  Mouth - mucous membranes moist, pharynx normal without lesions  Neck - supple, no significant adenopathy  Lymphatics - no palpable lymphadenopathy, no hepatosplenomegaly  Chest - clear to auscultation, no wheezes, rales or rhonchi, symmetric air entry  Heart - normal rate, regular rhythm, normal S1, S2, no murmurs, rubs, clicks or gallops  Abdomen - soft, nontender, nondistended, no masses or organomegaly  Neurological - alert, oriented, normal speech, no focal findings or movement disorder noted  Musculoskeletal - no joint tenderness, deformity or swelling  Extremities - peripheral pulses normal, no pedal edema, no clubbing or cyanosis  Skin - normal coloration and turgor, no rashes, no suspicious skin lesions noted     Review of Diagnostic data:   Lab Results   Component Value Date    WBC 20.0 (H) 02/20/2022    HGB 11.9 02/20/2022    HCT 39.5 02/20/2022    MCV 96.8 02/20/2022     (H) 02/20/2022       Chemistry        Component Value Date/Time     02/20/2022 1026    K 3.4 (L) 02/20/2022 1026     02/20/2022 1026    CO2 22 02/20/2022 1026    BUN 13 02/20/2022 1026    CREATININE 0.47 (L) 02/20/2022 1026        Component Value Date/Time    CALCIUM 9.0 02/20/2022 1026    ALKPHOS 65 02/20/2022 1026    AST 12 02/20/2022 1026    ALT 7 02/20/2022 1026    BILITOT 0.16 (L) 02/20/2022 1026          Lab Results   Component Value Date     335 (H) 02/18/2022         IMPRESSION:   Recurrent ovarian cancer. Original diagnosis 2005 with multiple recurrences. Diffuse metastasis. Recent active intra-abdominal bleeding due to splenic mass with GI infiltration. Status post embolization  S/p seizure disorder.      PLAN:   Discussed palliative treatment. Different regimens were discussed. We prescribed Doxil as single agent but insurance declined. Patient was started on Doxil and Avastin. She tolerated treatment fairly well. However due to recent episode of intra-abdominal bleeding we will discontinue Avastin. Next treatment will be with the use of Doxil. Patient had embolization by vascular. s/p hospitalization for seizure. No brain mets. Seizures controlled. Hospitalized last week with left leg DVT and anemia. Hemoglobin is now stable. She received blood transfusion. No active bleeding. She is currently on Eliquis for DVT. Tolerated well with no side effects. S/p hospitalization for severe anemia. S/p blood transfusion. Currently patient is relatively stable and tolerating chemotherapy fairly well. Repeated tumor marker today showed stable marker. I will continue on treatment until progression or intolerable side effects. CT scan before RV. She was referred to psychiatry for management of depression. Patient's questions were answered to the best of her satisfaction and she verbalized full understanding and agreement.

## 2022-02-21 NOTE — ED NOTES
Shift hand off given to [facility]  Patient stable, non distressed    Patient to be transported to [facility]  02/20/22 4504

## 2022-02-21 NOTE — PROGRESS NOTES
level 4.52  Keppra levels within normal limits  Lamictal levels 1.3  Patient had leukocytosis 20, hemoglobin 11.9 stable, platelets 272. Blood cultures and urine cultures were sent  UA unremarkable with 2+ glucosuria but no leukocyte esterase or bacteriuria or WBCs. CT chest for PE was negative, no concerns for pneumonia, showed pericardial effusion which was present on previous CTs as well  CT head was negative  X-ray chest was unremarkable  Abdominal x-ray showed ileus  EKG was unremarkable with 467 QTC  Patient received 1 L fluid bolus in the ER and started on fluids. Patient was transferred to medicine service for further management. Assessment    Principal Problem:    Dehydration  Active Problems:    Seizure ([address]    Type 2 diabetes mellitus without complication, without long-term current use of insulin (HCC)    Cancer, metastatic to bone (HCC)    Fatigue    Chronic deep vein thrombosis (DVT) of femoral vein of left lower extremity (HCC)    Ovarian cancer (HCC)    AMS (altered mental status)    Lactic acidosis    Ileus (HCC)    Elevated troponin    Pericardial effusion    Hypokalemia    Acute respiratory failure with hypoxia (HCC)  Resolved Problems:    * No resolved hospital problems. *    Plan     1. Encephalopathy-multifactorial, dehydration, poor oral intake,? Postictal given subtherapeutic levels, recent Gemzar use. We will rehydrate the patient, get EEG and neuro recommendations. 2. ?  Seizure disorder-continue antiepileptics, neuro to follow, follow-up on CK and myoglobin, Unasyn given leukocytosis and tachycardia for possible aspiration pneumonia. Urine cultures were negative. No other source of infection possible at this point. We will follow up on blood cultures. 3. Acute hypoxic respiratory failure-wean off oxygen as tolerated. Follow-up on chest x-ray. 4. CTh showed pericardial effusion, will get echo.   5. Ileus on abdominal x-ray-unable to insert NG, patient currently n.p.o. IR consult for NG placement. Patient will also need CT abdomen. Continue IV fluids and replace electrolytes as needed. 6. Recent DVT-on Eliquis at home-due to n.p.o. status patient started on Lovenox 40 twice daily    Nutrition/Diet:  N.p.o.      DVT Prophylaxis:  Already on Lovenox full dose for recent DVT.         Vivi Moyer MD  Internal Medicine Resident  [id number] [facility]  2/21/2022 6:33 AM

## 2022-02-21 NOTE — H&P
[facility] Neurology   [address]    Neurology Consult Note            Date:   2/21/2022  Patient name:  Becca Puente  Date of admission:  2/20/2022  9:53 AM  MRN:   [id number]  Account:  [de-identified]  YOB: 1965  PCP:    Lottie Kayser, MD  Room:   6939/4396-09  Code Status:    Full Code    Chief Complaint:     Chief Complaint   Patient presents with    Altered Mental Status    Nausea & Vomiting       History Obtained From:   Patient    History of Present Illness: The patient is a 60 y.o. female with significant past medical history of ovarian cancer with metastasis s/p chemo and radiation therapy, seizure disorder on Lamictal and Keppra who presents with nausea, vomiting and AMS. 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 She had surgical debulking and she had systemic treatment with Taxol and carboplatin for 6 cycles. Patient was in remission for about 2 years. She had a relapse in 2007 and treated with topotecan with good results. Patient relapsed again in 2008 and was treated with Taxol carboplatin. After 3 cycles of systemic chemotherapy she had problems with carboplatin so she finished 3 more cycles with Taxol. She did well again for 2 to 3 years until she had a relapse in 2012 and she had intraperitoneal chemotherapy treatment with Taxol. Patient was last seen by her oncologist in 2014 at which time she had relatively stable disease with normal tumor marker and no significant abnormal images. Patient moved to [place] after that and she was lost for oncology follow-ups. Patient states that she came back to [place], [place] 3 years ago and has been following with an oncologist since. Patient states her last chemo therapy was last week on February 18th, 2022. Re imaging confirmed metastatic relapse. Biopsy confirmed ovarian cancer recurrence.  Scan showed extensive intraabdominal and splenic involvement and lung mets. CT brain was unremarkable on 2/20/22. Upon my evaluation, patient is alert and oriented x3, follows commands and has a non-focal neurological examination.  and Myoglobin 62. Urine culture positive for E. Coli, patient currently on Unasyn. Past Medical History:     Past Medical History:   Diagnosis Date    Anemia     Bleeding 10/2020    intra-abdominal bleeding -due to splenic mass with GI infiltration. Status post embolization    [facility] [address]     Depression     Diabetes mellitus ([facility] [address]     GERD (gastroesophageal reflux disease)     Hx of blood clots     Hypertension     Metastatic cancer ([facility] [address] 10/2020    extensive intraabdominal and splenic involvement and lung mets.  Ovarian cancer ([facility] [address]     low grade serous ovarian carcinoma    Post chemo evaluation     2007: Chemo via med onc (Dr. Elvis Bailey), 2008: Umberto Nims due to rising CA-125, 2013: intraperitoneal chemo,12/2015: Ca125 - 25     Splenic lesion         Past Surgical History:     Past Surgical History:   Procedure Laterality Date    ABSCESS DRAINAGE  2013    Franca rectal    ANUS SURGERY      ANAL FISSURECTOMY    CARDIAC CATHETERIZATION      COLECTOMY  03/2013    ex lap, tumor debulking, transverse colectomy w reanastamosis, subgastric omentectomy, intraperitoneal port placement    HYSTERECTOMY, TOTAL ABDOMINAL      IR EMBOLIZATION HEMORRHAGE  10/05/2020    intra-abdominal bleeding -due to splenic mass with GI infiltration. Status post embolization [employer] interlock coils x7. mri condtional 3t ok, safe immediately post implant.  IR PORT PLACEMENT EQUAL OR GREATER THAN 5 YEARS  08/24/2020    IR PORT PLACEMENT EQUAL OR GREATER THAN 5 YEARS 8/24/2020 Gala Gutierrez MD [facility]    PORT SURGERY      IP Port    TONSILLECTOMY          Medications Prior to Admission:     Prior to Admission medications    Medication Sig Start Date End Date Taking?  Authorizing Provider LORazepam (ATIVAN) 1 MG tablet Take 1 tablet by mouth every 8 hours as needed for Anxiety for up to 30 doses. 2/18/22 4/2/22  Juli Barbosa MD   polyethylene glycol (MIRALAX) 17 g packet Take 17 g by mouth daily as needed for Constipation 2/18/22 3/20/22  Juli Barbosa MD   lamoTRIgine (LAMICTAL) 25 MG tablet TAKE 3 TABS IN IN THE MORNING AND 3 TABS IN IN THE EVENING 2/9/22   Jr Odell MD   levETIRAcetam (KEPPRA) 750 MG tablet Take 2 tablets by mouth 2 times daily 2/9/22   Julio César Rajan MD   diazePAM 5 MG/0.1ML LIQD 1 puff by Nasal route as needed (seizures)  Patient not taking: Reported on 2/18/2022 2/9/22   Julio César Rajan MD   morphine (MS CONTIN) 30 MG extended release tablet TAKE 1 TABLET BY MOUTH 2 TIMES DAILY FOR 30 DAYS. 1/27/22 2/26/22  Jr Bhatia MD   morphine (MS CONTIN) 15 MG extended release tablet TAKE 1 TABLET BY MOUTH 2 TIMES DAILY FOR 30 DAYS. 1/27/22 2/26/22  Juli Barbosa MD   oxyCODONE-acetaminophen (PERCOCET) 5-325 MG per tablet TAKE 1 TABLET BY MOUTH EVERY 4 HOURS AS NEEDED FOR PAIN (BREAKTHROUGH PAIN) - REDUCE DOSES TAKEN AS PAIN BECOMES MANAGEABLE 1/27/22 2/26/22  Juli Barbosa MD   pantoprazole (PROTONIX) 40 MG tablet Take 1 tablet by mouth daily 1/3/22   Juli Barbosa MD   ondansetron (ZOFRAN-ODT) 4 MG disintegrating tablet Take 1 tablet by mouth every 8 hours as needed for Nausea or Vomiting 12/3/21   Juli Barbosa MD   naloxegol (MOVANTIK) 12.5 MG TABS tablet Take 1 tablet by mouth every morning  Patient not taking: Reported on 2/18/2022 11/15/21   Juli Barbosa MD   ferrous sulfate (IRON 325) 325 (65 Fe) MG tablet Take 1 tablet by mouth daily (with breakfast) 10/31/21   Van Humphries MD   morphine (MSIR) 30 MG tablet Take 30 mg by mouth 2 times daily as needed for Pain.     Historical Provider, MD   sertraline (ZOLOFT) 100 MG tablet TAKE 1 TABLET BY MOUTH DAILY 10/4/21 2/18/22  Juli Barbosa MD   ELIQUIS 5 MG TABS tablet Take 5 mg by mouth 2 times daily  21   Historical Provider, MD        Allergies:     Ceftriaxone    Social History:     Tobacco:    reports that she has been smoking cigarettes. She has been smoking about 1.00 pack per day. She uses smokeless tobacco.  Alcohol:      reports previous alcohol use. Drug Use:  reports no history of drug use. Family History:     Family History   Problem Relation Age of Onset    Alcohol Abuse Mother     Cirrhosis Mother        Review of Systems:     Review of Systems      Physical Exam:   BP (!) 137/45   Pulse 68   Temp 99.2 °F (37.3 °C) (Oral)   Resp 16   SpO2 98%   Temp (24hrs), Av °F (37.2 °C), Min:98.8 °F (37.1 °C), Max:99.2 °F (37.3 °C)    No results for input(s): POCGLU in the last 72 hours. Intake/Output Summary (Last 24 hours) at 2022 1412  Last data filed at 2022 0558  Gross per 24 hour   Intake 807.14 ml   Output    Net 807.14 ml         Neurologic Exam     Mental Status   Oriented to person, place, and time. Cranial Nerves   Cranial nerves II through XII intact. Motor Exam     Strength   Strength 5/5 throughout.      Sensory Exam   Light touch normal.       GENERAL  Appears comfortable and in no distress   HEENT  NC/ AT   HEART  S1 and S2 heard; palpation of pulses: radial pulse    NECK  Supple and no bruits heard   MENTAL STATUS:  Alert, oriented, intact memory, no confusion, normal speech, normal language, no hallucination or delusion   CRANIAL NERVES: II     -      Visual fields intact to confrontation  III,IV,VI -  PERR, EOMs full, no ptosis  V     -     Normal facial sensation   VII    -     Normal facial symmetry  VIII   -     Intact hearing   IX,X -     Symmetrical palate  XI    -     Symmetrical shoulder shrug  XII   -     Midline tongue, no atrophy    MOTOR FUNCTION: RUE: Significant for good strength of grade 5/5 in proximal and distal muscle groups   LUE: Significant for good strength of grade 5/5 in proximal and distal muscle groups   RLE: Significant for good strength of grade 5/5 in proximal and distal muscle groups   LLE: Significant for good strength of grade 5/5 in proximal and distal muscle groups      Normal bulk, normal tone and no involuntary movements, no tremor   SENSORY FUNCTION:  Normal touch, normal pinprick, normal vibration, normal proprioception   CEREBELLAR FUNCTION:  Intact fine motor control over upper limbs and lower limbs   REFLEX FUNCTION:  Symmetric in upper and lower extremities, no Babinski sign   STATION and GAIT Not accessed     Imaging:   CT Head WO Contrast     Result Date: 2/20/2022  No acute intracranial abnormality.      Investigations:      Laboratory Testing:  Recent Results (from the past 24 hour(s))   Lamotrigine Level    Collection Time: 02/20/22 10:48 PM   Result Value Ref Range    Lamotrigine Lvl 1.3 (L) 3.0 - 15.0 ug/mL   Levetiracetam Level    Collection Time: 02/20/22 10:48 PM   Result Value Ref Range    Levetiracetam Lvl 4 ug/mL   Troponin    Collection Time: 02/20/22 10:48 PM   Result Value Ref Range    Troponin, High Sensitivity 17 (H) 0 - 14 ng/L    Troponin T NOT REPORTED <0.03 ng/mL    Troponin Interp NOT REPORTED    Prolactin    Collection Time: 02/20/22 10:48 PM   Result Value Ref Range    Prolactin 4.52 (L) 4.79 - 23.30 ug/L   DRUG SCREEN MULTI URINE    Collection Time: 02/21/22  5:58 AM   Result Value Ref Range    Amphetamine Screen, Ur NEGATIVE NEGATIVE    Barbiturate Screen, Ur NEGATIVE NEGATIVE    Benzodiazepine Screen, Urine NEGATIVE NEGATIVE    Cocaine Metabolite, Urine NEGATIVE NEGATIVE    Methadone Screen, Urine NEGATIVE NEGATIVE    Opiates, Urine POSITIVE (A) NEGATIVE    Phencyclidine, Urine NEGATIVE NEGATIVE    Propoxyphene, Urine NOT REPORTED NEGATIVE    Cannabinoid Scrn, Ur NEGATIVE NEGATIVE    Oxycodone Screen, Ur NEGATIVE NEGATIVE    Methamphetamine, Urine NOT REPORTED NEGATIVE    Tricyclic Antidepressants, Urine NOT REPORTED NEGATIVE    MDMA, Urine NOT REPORTED NEGATIVE    Buprenorphine Urine NOT REPORTED NEGATIVE    Test Information       Assay provides medical screening only. The absence of expected drug(s) and/or metabolite(s) may indicate diluted or adulterated urine, limitations of testing or timing of collection.    Basic Metabolic Panel w/ Reflex to MG    Collection Time: 02/21/22  6:10 AM   Result Value Ref Range    Glucose 109 (H) 70 - 99 mg/dL    BUN 8 6 - 20 mg/dL    CREATININE 0.39 (L) 0.50 - 0.90 mg/dL    Bun/Cre Ratio NOT REPORTED 9 - 20    Calcium 8.9 8.6 - 10.4 mg/dL    Sodium 137 135 - 144 mmol/L    Potassium 3.6 (L) 3.7 - 5.3 mmol/L    Chloride 103 98 - 107 mmol/L    CO2 22 20 - 31 mmol/L    Anion Gap 12 9 - 17 mmol/L    GFR Non-African American >60 >60 mL/min    GFR African American >60 >60 mL/min    GFR Comment          GFR Staging NOT REPORTED    CBC with Auto Differential    Collection Time: 02/21/22  6:10 AM   Result Value Ref Range    WBC 14.1 (H) 3.5 - 11.3 k/uL    RBC 3.56 (L) 3.95 - 5.11 m/uL    Hemoglobin 10.5 (L) 11.9 - 15.1 g/dL    Hematocrit 33.7 (L) 36.3 - 47.1 %    MCV 94.7 82.6 - 102.9 fL    MCH 29.5 25.2 - 33.5 pg    MCHC 31.2 28.4 - 34.8 g/dL    RDW 17.4 (H) 11.8 - 14.4 %    Platelets 764 (H) 692 - 453 k/uL    MPV 9.4 8.1 - 13.5 fL    NRBC Automated 0.0 0.0 per 100 WBC    Differential Type NOT REPORTED     WBC Morphology NOT REPORTED     RBC Morphology ANISOCYTOSIS PRESENT     Platelet Estimate NOT REPORTED     Seg Neutrophils 91 (H) 36 - 65 %    Lymphocytes 6 (L) 24 - 43 %    Monocytes 3 3 - 12 %    Eosinophils % 0 (L) 1 - 4 %    Basophils 0 0 - 2 %    Immature Granulocytes 1 (H) 0 %    Segs Absolute 12.77 (H) 1.50 - 8.10 k/uL    Absolute Lymph # 0.78 (L) 1.10 - 3.70 k/uL    Absolute Mono # 0.38 0.10 - 1.20 k/uL    Absolute Eos # <0.03 0.00 - 0.44 k/uL    Basophils Absolute 0.05 0.00 - 0.20 k/uL    Absolute Immature Granulocyte 0.08 0.00 - 0.30 k/uL   Procalcitonin    Collection Time: 02/21/22  6:10 AM   Result Value Ref Range Procalcitonin 0.25 (H) <0.09 ng/mL   Troponin    Collection Time: 02/21/22  6:10 AM   Result Value Ref Range    Troponin, High Sensitivity 16 (H) 0 - 14 ng/L    Troponin T NOT REPORTED <0.03 ng/mL    Troponin Interp NOT REPORTED    CK    Collection Time: 02/21/22  6:10 AM   Result Value Ref Range    Total  26 - 192 U/L   Myoglobin, Serum    Collection Time: 02/21/22  6:10 AM   Result Value Ref Range    Myoglobin 62 (H) 25 - 58 ng/mL   Troponin    Collection Time: 02/21/22 10:30 AM   Result Value Ref Range    Troponin, High Sensitivity 14 0 - 14 ng/L    Troponin T NOT REPORTED <0.03 ng/mL    Troponin Interp NOT REPORTED          Assessment :      Primary Problem  Dehydration    Active Hospital Problems    Diagnosis Date Noted    Lactic acidosis [E87.2] 02/21/2022    Ileus ([facility] [address] [K56.7] 02/21/2022    Elevated troponin [R77.8] 02/21/2022    Pericardial effusion [I31.3] 02/21/2022    Hypokalemia [E87.6] 02/21/2022    Acute respiratory failure with hypoxia (HCC) [J96.01] 02/21/2022    Dehydration [E86.0] 02/20/2022    AMS (altered mental status) [R41.82] 02/20/2022    Ovarian cancer ([facility] [address] [C56.9] 05/29/2021    Chronic deep vein thrombosis (DVT) of femoral vein of left lower extremity (HCC) [I82.512] 03/11/2021    Fatigue [R53.83]     Cancer, metastatic to bone ([facility] [address] [C79.51] 01/28/2021    Type 2 diabetes mellitus without complication, without long-term current use of insulin ([address] [E11.9]     Seizure ([facility] [address] [R56.9] 10/21/2020       Patient is a 60 y.o. Non- / non  female  with a PMH of ovarian cancer with metastasis post chemotherapy/radiotherapy couple of days ago  presented with N/V, chills, fatigue and altered mental status. Patient's urine culture was positive for E coli, currently on Unasyn. Patient admitted to inpatient status for further managament. AMS resolved. Plan:     1. Dehydration: Likely secondary to emesis. IVF 0.9% NACL infusion at 75 ml/hr  2.    Seizures: Resume her home Lamictal and Keppra. EEG ordered. Lamictal level 1.3 on 2/20/22 and Keppra level 4 on 2/20/22. 3.   Urine culture positive for E coli, continue Unasyn  4. Neuro checks: if patient becomes confused will do MRI Brain w contrast to rule out brain mets  5. Neurology will continue to follow patient        Consultations:   IP [id number] TO INTERNAL MEDICINE  IP CONSULT TO CASE MANAGEMENT  IP CONSULT TO NEUROLOGY      Follow-up further recommendations after discussing the case with attending  The plan was discussed with the patient, patient's family and the medical staff. Patient is admitted as inpatient status because of co-morbidities listed above, severity of signs and symptoms as outlined, requirement for current medical therapies and most importantly because of direct risk to patient if care not provided in a hospital setting.     Maricruz Reynolds  2/21/2022  2:12 PM    Copy sent to Dr. Abel Pfeiffer MD

## 2022-02-21 NOTE — PLAN OF CARE
Problem: Pain:  Goal: Pain level will decrease  Description: Pain level will decrease  Outcome: Ongoing  Goal: Control of acute pain  Description: Control of acute pain  Outcome: Ongoing  Goal: Control of chronic pain  Description: Control of chronic pain  Outcome: Ongoing     Problem: Nausea/Vomiting:  Goal: Absence of nausea/vomiting  Description: Absence of nausea/vomiting  Outcome: Ongoing  Goal: Able to drink  Description: Able to drink  Outcome: Ongoing  Goal: Able to eat  Description: Able to eat  Outcome: Ongoing  Goal: Ability to achieve adequate nutritional intake will improve  Description: Ability to achieve adequate nutritional intake will improve  Outcome: Ongoing     Problem: Infection - Central Venous Catheter-Associated Bloodstream Infection:  Goal: Will show no infection signs and symptoms  Description: Will show no infection signs and symptoms  Outcome: Ongoing

## 2022-02-21 NOTE — FLOWSHEET NOTE
Assessment: Patient is a 60 y.o. female who was admitted to the hospital due to [place] and altered mental status. \" Patient was in hospital bed, appearing uncomfortable, when  visited. Intervention:  visited patient per initial rounding visits.  introduced herself to patient and inquired about her symptoms. Patient indicated that she remains \"nauseous. \"  provided support and reassurance to patient.  provided hospitality to patient and a cold wash cloth for her face and neck. Patient thanked  for visit and care. Outcome: Patient was receptive of 's visit and support. Plan: Chaplains can make follow-up visit, per request. Cuco Solomon can be reached 24/7 via [employer].     Beth Garcia     02/20/22 2142   Encounter Summary   Services provided to: Patient   Referral/Consult From: Rounding   Continue Visiting   (2/20/2022)   Complexity of Encounter Low   Length of Encounter 15 minutes   Spiritual Assessment Completed Yes   Routine   Type Initial   Spiritual/Advent   Type Spiritual support   Assessment Approachable;Helplessness   Intervention Sustaining presence/ Ministry of presence   Outcome Receptive

## 2022-02-21 NOTE — PROGRESS NOTES
[facility]  Internal Medicine Teaching Residency Program  Inpatient Daily Progress Note  ______________________________________________________________________________    Patient: Bhavya Garcia [facility]  YOB: 1965   [facility]:[id number]    Acct: [de-identified]     Room: [facility]  Admit date: 2/20/2022  Today's date: 02/21/22  Number of days in the hospital: 1    SUBJECTIVE   Admitting Diagnosis: Dehydration  CC: Altered mental status, Nausea and vomiting. Pt examined at bedside. Chart & results reviewed. Overnight there was a trouble with NG tube placement for ileus as patient was bleeding through the nose. IR was consulted and she had a successful placement of NG tube. This a.m. patient is afebrile and hemodynamically stable although hypertensive with /80 mmHg on labetalol IV every 6 hours as needed. ROS:  Constitutional:  negative for chills, fevers, sweats  Respiratory:  negative for cough, dyspnea on exertion, hemoptysis, shortness of breath, wheezing  Cardiovascular:  negative for chest pain, chest pressure/discomfort, lower extremity edema, palpitations  Gastrointestinal:  negative for abdominal pain, constipation, diarrhea, nausea, vomiting  Neurological:  negative for dizziness, headache  BRIEF HISTORY     Patient came in with abdominal pain, nausea and vomiting which started yesterday and is getting. 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 Patient currently has extensive intra-abdominal and splenic and lung involvement from ovarian cancer. Patient with recent intra-abdominal bleeding due to splenic mass with GI infiltration s/p embolization. Patient also has seizure disorder. She is currently on palliative treatment with Doxil. Patient recently had a DVT last week for which she was started on Eliquis. Patient's last chemotherapy was on 2022 when she received Gemzar for chemotherapy  Nausea, vomiting, diarrhea, mouth sores, drowsiness, muscle aches are some of the known side effects of Gemzar. See H&P of admitting/intern resident for more details.      PMH/Home meds:  · Metastatic ovarian cancer with mets to liverpalliative chemotherapy  · Seizure disorder-Lamictal and Keppra  · Type 2 diabetes  · DVT on Eliquis  · ? Depression  · Anemia on iron supplements  · Protonix  · Percocet for pain     ER Course  On arrival patient afebrile, hypertensive with blood pressure between 498I to 978S systolic, on 2 L of oxygen via nasal cannula. Patient does not use any home oxygen. Patient's lab at time of admission showed sodium 142, potassium 3.4, bicarb 22, creatinine 0.47, glucose 150, lactic acid 2.8 which improved to 1.5  Patient's proBNP 328, troponins 51-57-17  LFTs normal  Blood glucose 115  Prolactin level 4.52  Keppra levels within normal limits  Lamictal levels 1.3  Patient had leukocytosis 20, hemoglobin 11.9 stable, platelets 475. Blood cultures and urine cultures were sent  UA unremarkable with 2+ glucosuria but no leukocyte esterase or bacteriuria or WBCs. CT chest for PE was negative, no concerns for pneumonia, showed pericardial effusion which was present on previous CTs as well  CT head was negative  X-ray chest was unremarkable  Abdominal x-ray showed ileus  EKG was unremarkable with 467 QTC  Patient received 1 L fluid bolus in the ER and started on fluids. Patient was transferred to medicine service for further management.     OBJECTIVE     Vital Signs:  BP (!) 137/45   Pulse 68   Temp 99.2 °F (37.3 °C) (Oral)   Resp 16   SpO2 98%     Temp (24hrs), Av °F (37.2 °C), Min:98.8 °F (37.1 °C), Max:99.2 °F (37.3 °C)    In: 807.1   Out: -     Physical Exam:  Constitutional: This is a well developed, well nourished, 25-29.9 - Overweight 60y.o. year old female who is alert, oriented, cooperative but in apparent distress. Head:normocephalic and atraumatic. EENT:  PERRLA. No conjunctival injections. Septum was midline, mucosa was without erythema, exudates or cobblestoning. No thrush was noted. Neck: Supple without thyromegaly. No elevated JVP. Trachea was midline. Respiratory: Chest was symmetrical without dullness to percussion. Breath sounds bilaterally were clear to auscultation. There were no wheezes, rhonchi or rales. There is no intercostal retraction or use of accessory muscles. No egophony noted. Cardiovascular: Regular without murmur, clicks, gallops or rubs. Abdomen: Normal bowel sounds. Slightly rounded and soft without organomegaly. Lymphatic: No lymphadenopathy. Musculoskeletal: Normal curvature of the spine. No gross muscle weakness. Extremities:  No lower extremity edema, ulcerations, tenderness, varicosities or erythema. Muscle size, tone and strength are normal.  No involuntary movements are noted. Skin:  Warm and dry. Good color, turgor and pigmentation. No lesions or scars.   No cyanosis or clubbing  Neurological/Psychiatric: The patient's general behavior, level of consciousness, thought content and emotional status is normal.       Medications:  Scheduled Medications:    enoxaparin  40 mg SubCUTAneous BID    oxymetazoline  2 spray Each Nostril BID    ampicillin-sulbactam  3,000 mg IntraVENous Q6H    sodium chloride flush  5-40 mL IntraVENous 2 times per day    sodium chloride flush  5-40 mL IntraVENous 2 times per day    lamoTRIgine  75 mg Oral BID    levetiracetam  1,500 mg IntraVENous Q12H    pantoprazole  40 mg IntraVENous Daily    And    sodium chloride (PF)  10 mL IntraVENous Daily     Continuous Infusions:    sodium chloride      sodium chloride      sodium chloride 75 mL/hr at 02/21/22 0425     PRN Medicationsbisacodyl, 10 mg, Daily PRN  albuterol, 2.5 mg, As Directed RT PRN  sodium chloride flush, 5-40 mL, PRN  sodium chloride, 25 mL, PRN  sodium chloride flush, 5-40 mL, PRN  sodium chloride, 25 mL, PRN  acetaminophen, 650 mg, Q6H PRN   Or  acetaminophen, 650 mg, Q6H PRN  potassium chloride, 40 mEq, PRN   Or  potassium alternative oral replacement, 40 mEq, PRN   Or  potassium chloride, 10 mEq, PRN  morphine, 1 mg, Q4H PRN  promethazine, 12.5 mg, Q6H PRN   Or  ondansetron, 4 mg, Q6H PRN  labetalol, 10 mg, Q6H PRN        Diagnostic Labs:  CBC:   Recent Labs     02/20/22  1026 02/21/22  0610   WBC 20.0* 14.1*   RBC 4.08 3.56*   HGB 11.9 10.5*   HCT 39.5 33.7*   MCV 96.8 94.7   RDW 17.7* 17.4*   * 609*     BMP:   Recent Labs     02/20/22  1026 02/21/22  0610    137   K 3.4* 3.6*    103   CO2 22 22   BUN 13 8   CREATININE 0.47* 0.39*     BNP: No results for input(s): BNP in the last 72 hours. PT/INR: No results for input(s): PROTIME, INR in the last 72 hours. APTT: No results for input(s): APTT in the last 72 hours. CARDIAC ENZYMES: No results for input(s): CKMB, CKMBINDEX, TROPONINI in the last 72 hours. Invalid input(s): CKTOTAL;3  FASTING LIPID PANEL:  Lab Results   Component Value Date    CHOL 131 03/10/2021    HDL 33 (L) 03/10/2021    TRIG 147 03/10/2021     LIVER PROFILE:   Recent Labs     02/20/22  1026   AST 12   ALT 7   BILITOT 0.16*   ALKPHOS 65      MICROBIOLOGY:   Lab Results   Component Value Date/Time    CULTURE NO GROWTH 1 DAY 02/20/2022 11:20 AM       Imaging:    XR ABDOMEN (KUB) (SINGLE AP VIEW)    Result Date: 2/20/2022  Ileus. Probable gallstone. Catheter left abdomen and pelvis requires clinical correlation. CT Head WO Contrast    Result Date: 2/20/2022  No acute intracranial abnormality. XR CHEST PORTABLE    Result Date: 2/20/2022  Chronic findings in the chest without acute airspace disease identified. CT CHEST PULMONARY EMBOLISM W CONTRAST    Result Date: 2/20/2022  1. No evidence for acute pulmonary embolism.  2.  Bilateral pulmonary nodules, thoracic lymphadenopathy, soft tissue calcifications in the chest and upper abdomen and sclerotic bone lesions again demonstrated, as described previously, which may be related to the patient's history of malignancy. 3.  Small pericardial effusion. Trace left pleural effusion. 4.  Mild septal thickening suggestive of interstitial edema. ASSESSMENT & PLAN     ASSESSMENT / PLAN:     Principal Problem:    Dehydration  Active Problems:    Seizure ([address]    Type 2 diabetes mellitus without complication, without long-term current use of insulin (HCC)    Cancer, metastatic to bone (HCC)    Fatigue    Chronic deep vein thrombosis (DVT) of femoral vein of left lower extremity (HCC)    Ovarian cancer (HCC)    AMS (altered mental status)    Lactic acidosis    Ileus (HCC)    Elevated troponin    Pericardial effusion    Hypokalemia    Acute respiratory failure with hypoxia (HCC)  Resolved Problems:    * No resolved hospital problems. *    IMPRESSION  This is a 60 y.o. female with a PMH of ovarian cancer with metastasis post chemotherapy/radiotherapy couple of days ago  presented with N/V, chills, fatigue, weakess and altered mental status. Patient also has elevated troponins for which cardiology is on board and new leukocytosis for which hematology is following. Patient admitted to inpatient status for further managament. 1.  Ileus:   X-ray KUB with impression of probable gallstone, catheter left abdomin and pelvis. Plan is to keep NPO with IVF dehydration along with NG tube. Although patient had nasal bleeding so ordered oxymetazoline nasal spray. NG tube reattempt again after consulting with IR tomorrow. CT abdomen pelvis with and without contrast ordered in the a.m, follow-up. Needs general surgery consult. 2.  Dehydration:  Likely secondary to emesis. IVF 0.9% NACL infusion at 75 ml/hr. 3. Seizures:   Resume her home Lamictal and Keppra. EEG ordered. Consult neurology.   4. Type 2 diabetes mellitus without complication, without long-term current use of insulin ([facility] [address]? Glucose 150-->109. Follow BG. Hypoglycemia protocol. 5. Leukocytosis:   Pancultures ordered. Leukocytosis secondary to UTI with E. coli given that she has dysuria. Procalcitonin 0.25. With low likelihood of sepsis ruled out. .  Follow-up pancultures. Received Vanco and Zosyn which are discontinued. She is started on Unasyn 3000 mg IV every 6 hours  6 . History of hypertension:   Placed on IV labetalol every 6 hours as needed. 7.  Hypokalemia:   Replace to keep K > 4.  8.  Acute respiratory failure with hypoxia with unclear etiology:   Not on oxygen at home. Supplemental oxygen as needed and wean down as tolerated. Follow-up on chest x-ray and echo. Incentive spirometry and RT aerosol protocol. Albuterol nebulizer 2.5 mg as needed for wheezing. 9.  Lactic acidosis with probable sepsis:   Lactic acid was 2.8 which trended down to 1.5. Continue IVF. 10.  Chronic DVT of femoral vein of lower extremity:   Patient is on Eliquis at home which is continued.     DVT ppx: Eliquis  GI ppx: Protonix     PT/OT/SW: Consulted  Discharge Planning: Case management consulted. Taj Child MD  Internal Medicine Resident, PGY-1  [facility]; [place], [place]  2/21/2022, 12:58 PM   Attending Physician Statement  I have discussed the care of Joe Cat, including pertinent history and exam findings,  with the resident. I have seen and examined the patient and the key elements of all parts of the encounter have been performed by me. I agree with the assessment, plan and orders as documented by the resident with additions . Treatment plan Discussed with nursing staff in detail , all questions answered . Electronically signed by Chip Sheikh MD on   2/21/22 at 3:50 PM EST    Please note that this chart was generated using voice recognition Dragon dictation software.   Although every effort was made to ensure the accuracy of this automated transcription, some errors in transcription may have occurred.

## 2022-02-21 NOTE — H&P
Scratch that scratch that  [facility]     Department of Internal Medicine - Staff Internal Medicine Teaching Service          ADMISSION NOTE/HISTORY AND PHYSICAL EXAMINATION   Date: 2/21/2022  Patient Name: Anabel Crenshaw  Date of admission: 2/20/2022  9:53 AM  YOB: 1965  PCP: Candice Ornelas MD  History Obtained From:  patient, electronic medical record    CHIEF COMPLAINT     Chief complaint: Altered mental status, Nausea and vomiting. HISTORY OF PRESENTING ILLNESS     The patient is a pleasant 60 y.o. female with a PMH significant for cervical cancer with metastasis s/p chemotherapy/radiotherapy a couple of days ago presents with a chief complaint of nausea/vomiting and altered mental status. She does follow with oncology outpatient and her last chemotherapy was a week ago. In the ER she appeared somnolent with no focal deficits. She denied headache, fever, chills, traumatic injuries, abdominal pain, chest pain or shortness of breath. She denied rashes, pelvic symptoms or vaginal symptoms. She did complain of back pain and dysuria.      Review of Systems:  General ROS: Completed and except as mentioned above were negative   HEENT ROS: Completed and except as mentioned above were negative   Allergy and Immunology ROS:  Completed and except as mentioned above were negative  Hematological and Lymphatic ROS:  Completed and except as mentioned above were negative  Respiratory ROS:  Completed and except as mentioned above were negative  Cardiovascular ROS:  Completed and except as mentioned above were negative  Gastrointestinal ROS: Completed and except as mentioned above were negative  Genito-Urinary ROS:  Completed and except as mentioned above were negative  Musculoskeletal ROS:  Completed and except as mentioned above were negative  Neurological ROS:  Completed and except as mentioned above were negative  Skin & Dermatological ROS:  Completed and except as mentioned above were negative  Psychological ROS:  Completed and except as mentioned above were negative    PAST MEDICAL HISTORY     Past Medical History:   Diagnosis Date    Anemia     Bleeding 10/2020    intra-abdominal bleeding -due to splenic mass with GI infiltration. Status post embolization    [facility]     Depression     Diabetes mellitus ([facility] [facility]     GERD (gastroesophageal reflux disease)     Hx of blood clots     Hypertension     Metastatic cancer ([facility] [address] 10/2020    extensive intraabdominal and splenic involvement and lung mets.  Ovarian cancer ([facility] [address]     low grade serous ovarian carcinoma    Post chemo evaluation     2007: Chemo via med onc (Dr. Diego Foote), 2008: Raf Aye due to rising CA-125, 2013: intraperitoneal chemo,12/2015: Ca125 - 25     Splenic lesion        PAST SURGICAL HISTORY     Past Surgical History:   Procedure Laterality Date    ABSCESS DRAINAGE  2013    Franca rectal    ANUS SURGERY      ANAL FISSURECTOMY    CARDIAC CATHETERIZATION      COLECTOMY  03/2013    ex lap, tumor debulking, transverse colectomy w reanastamosis, subgastric omentectomy, intraperitoneal port placement    HYSTERECTOMY, TOTAL ABDOMINAL      IR EMBOLIZATION HEMORRHAGE  10/05/2020    intra-abdominal bleeding -due to splenic mass with GI infiltration. Status post embolization [employer] interlock coils x7. mri condtional 3t ok, safe immediately post implant.  IR PORT PLACEMENT EQUAL OR GREATER THAN 5 YEARS  08/24/2020    IR PORT PLACEMENT EQUAL OR GREATER THAN 5 YEARS 8/24/2020 Colin Frausto MD [facility] SPECIAL PROCEDURES    PORT SURGERY      IP Port    TONSILLECTOMY         ALLERGIES     Ceftriaxone    MEDICATIONS PRIOR TO ADMISSION     Prior to Admission medications    Medication Sig Start Date End Date Taking? Authorizing Provider   LORazepam (ATIVAN) 1 MG tablet Take 1 tablet by mouth every 8 hours as needed for Anxiety for up to 30 doses.  2/18/22 4/2/22  Josy Guzman MD polyethylene glycol (MIRALAX) 17 g packet Take 17 g by mouth daily as needed for Constipation 2/18/22 3/20/22  Angie Gambino MD   lamoTRIgine (LAMICTAL) 25 MG tablet TAKE 3 TABS IN IN THE MORNING AND 3 TABS IN IN THE EVENING 2/9/22   Jr Tillman MD   levETIRAcetam (KEPPRA) 750 MG tablet Take 2 tablets by mouth 2 times daily 2/9/22   Melonie Malagon MD   diazePAM 5 MG/0.1ML LIQD 1 puff by Nasal route as needed (seizures)  Patient not taking: Reported on 2/18/2022 2/9/22   Melonie Malagon MD   morphine (MS CONTIN) 30 MG extended release tablet TAKE 1 TABLET BY MOUTH 2 TIMES DAILY FOR 30 DAYS. 1/27/22 2/26/22  Minus Vijaya Bhatia MD   morphine (MS CONTIN) 15 MG extended release tablet TAKE 1 TABLET BY MOUTH 2 TIMES DAILY FOR 30 DAYS. 1/27/22 2/26/22  Angie Gambino MD   oxyCODONE-acetaminophen (PERCOCET) 5-325 MG per tablet TAKE 1 TABLET BY MOUTH EVERY 4 HOURS AS NEEDED FOR PAIN (BREAKTHROUGH PAIN) - REDUCE DOSES TAKEN AS PAIN BECOMES MANAGEABLE 1/27/22 2/26/22  Angie Gambino MD   pantoprazole (PROTONIX) 40 MG tablet Take 1 tablet by mouth daily 1/3/22   Angie Gambino MD   ondansetron (ZOFRAN-ODT) 4 MG disintegrating tablet Take 1 tablet by mouth every 8 hours as needed for Nausea or Vomiting 12/3/21   Angie Gambino MD   naloxegol (MOVANTIK) 12.5 MG TABS tablet Take 1 tablet by mouth every morning  Patient not taking: Reported on 2/18/2022 11/15/21   Angie Gambino MD   ferrous sulfate (IRON 325) 325 (65 Fe) MG tablet Take 1 tablet by mouth daily (with breakfast) 10/31/21   Ayaka Alicia MD   morphine (MSIR) 30 MG tablet Take 30 mg by mouth 2 times daily as needed for Pain.     Historical Provider, MD   sertraline (ZOLOFT) 100 MG tablet TAKE 1 TABLET BY MOUTH DAILY 10/4/21 2/18/22  Angie Gambino MD   ELIQUIS 5 MG TABS tablet Take 5 mg by mouth 2 times daily  5/26/21   Historical Provider, MD       SOCIAL HISTORY     Tobacco: Current every day smoker 1PPD  Alcohol: Not currently  Illicits: Never   Occupation:     FAMILY HISTORY     Family History   Problem Relation Age of Onset    Alcohol Abuse Mother     Cirrhosis Mother        PHYSICAL EXAM     Vitals: BP (!) 176/80   Pulse 86   Temp 98.8 °F (37.1 °C) (Axillary)   Resp 16   SpO2 98%   Tmax: Temp (24hrs), Av.7 °F (37.1 °C), Min:98.1 °F (36.7 °C), Max:99.1 °F (37.3 °C)    Last Body weight:   Wt Readings from Last 3 Encounters:   22 164 lb 8 oz (74.6 kg)   22 165 lb (74.8 kg)   22 162 lb 6.4 oz (73.7 kg)     Body Mass Index : There is no height or weight on file to calculate BMI. PHYSICAL EXAMINATION:  Constitutional: This is a well developed, well nourished, 25-29.9 - Overweight 60y.o. year old female who is alert, oriented, cooperative and in apparent distress. Head:normocephalic and atraumatic. EENT:  PERRLA. No conjunctival injections. Septum was midline, mucosa was without erythema, exudates or cobblestoning. No thrush was noted. Neck: Supple without thyromegaly. No elevated JVP. Trachea was midline. Respiratory: Chest was symmetrical without dullness to percussion. Breath sounds bilaterally were clear to auscultation. There were no wheezes, rhonchi or rales. There is no intercostal retraction or use of accessory muscles. No egophony noted. Cardiovascular: Regular without murmur, clicks, gallops or rubs. Abdomen: Slightly rounded and soft without organomegaly. No rebound, rigidity or guarding was appreciated. Lymphatic: No lymphadenopathy. Musculoskeletal: Normal curvature of the spine. No gross muscle weakness. Extremities:  No lower extremity edema, ulcerations, tenderness, varicosities or erythema. Muscle size, tone and strength are normal.  No involuntary movements are noted. Skin:  Warm and dry. Good color, turgor and pigmentation. No lesions or scars. No cyanosis or clubbing  Neurological/Psychiatric: The patient is weak and confused. INVESTIGATIONS     Laboratory Testing:     Recent Results (from the past 24 hour(s))   EKG 12 Lead    Collection Time: 02/20/22  9:59 AM   Result Value Ref Range    Ventricular Rate 90 BPM    Atrial Rate 90 BPM    P-R Interval 162 ms    QRS Duration 90 ms    Q-T Interval 386 ms    QTc Calculation (Bazett) 472 ms    P Axis 49 degrees    R Axis 87 degrees    T Axis 55 degrees   CBC with Auto Differential    Collection Time: 02/20/22 10:26 AM   Result Value Ref Range    WBC 20.0 (H) 3.5 - 11.3 k/uL    RBC 4.08 3.95 - 5.11 m/uL    Hemoglobin 11.9 11.9 - 15.1 g/dL    Hematocrit 39.5 36.3 - 47.1 %    MCV 96.8 82.6 - 102.9 fL    MCH 29.2 25.2 - 33.5 pg    MCHC 30.1 28.4 - 34.8 g/dL    RDW 17.7 (H) 11.8 - 14.4 %    Platelets 624 (H) 928 - 453 k/uL    MPV 9.3 8.1 - 13.5 fL    NRBC Automated 0.0 0.0 per 100 WBC    Differential Type NOT REPORTED     WBC Morphology NOT REPORTED     RBC Morphology NOT REPORTED     Platelet Estimate NOT REPORTED     Immature Granulocytes 0 0 %    Seg Neutrophils 88 (H) 36 - 66 %    Lymphocytes 1 (L) 24 - 44 %    Monocytes 11 (H) 1 - 7 %    Eosinophils % 0 (L) 1 - 4 %    Basophils 0 0 - 2 %    Absolute Immature Granulocyte 0.00 0.00 - 0.30 k/uL    Segs Absolute 17.60 (H) 1.8 - 7.7 k/uL    Absolute Lymph # 0.20 (L) 1.0 - 4.8 k/uL    Absolute Mono # 2.20 (H) 0.1 - 0.8 k/uL    Absolute Eos # 0.00 0.0 - 0.4 k/uL    Basophils Absolute 0.00 0.0 - 0.2 k/uL    Morphology ANISOCYTOSIS PRESENT    Comprehensive Metabolic Panel w/ Reflex to MG    Collection Time: 02/20/22 10:26 AM   Result Value Ref Range    Glucose 150 (H) 70 - 99 mg/dL    BUN 13 6 - 20 mg/dL    CREATININE 0.47 (L) 0.50 - 0.90 mg/dL    Bun/Cre Ratio NOT REPORTED 9 - 20    Calcium 9.0 8.6 - 10.4 mg/dL    Sodium 142 135 - 144 mmol/L    Potassium 3.4 (L) 3.7 - 5.3 mmol/L    Chloride 103 98 - 107 mmol/L    CO2 22 20 - 31 mmol/L    Anion Gap 17 9 - 17 mmol/L    Alkaline Phosphatase 65 35 - 104 U/L    ALT 7 5 - 33 U/L    AST 12 <32 U/L Total Bilirubin 0.16 (L) 0.3 - 1.2 mg/dL    Total Protein 6.7 6.4 - 8.3 g/dL    Albumin 4.2 3.5 - 5.2 g/dL    Albumin/Globulin Ratio 1.7 1.0 - 2.5    GFR Non-African American >60 >60 mL/min    GFR African American >60 >60 mL/min    GFR Comment          GFR Staging NOT REPORTED    Lipase    Collection Time: 02/20/22 10:26 AM   Result Value Ref Range    Lipase 13 13 - 60 U/L   Troponin    Collection Time: 02/20/22 10:26 AM   Result Value Ref Range    Troponin, High Sensitivity 51 (H) 0 - 14 ng/L    Troponin T NOT REPORTED <0.03 ng/mL    Troponin Interp NOT REPORTED    Magnesium    Collection Time: 02/20/22 10:26 AM   Result Value Ref Range    Magnesium 1.8 1.6 - 2.6 mg/dL   Urinalysis with Microscopic    Collection Time: 02/20/22 10:40 AM   Result Value Ref Range    Color, UA Yellow Yellow    Turbidity UA Turbid (A) Clear    Glucose, Ur 2+ (A) NEGATIVE    Bilirubin Urine NEGATIVE NEGATIVE    Ketones, Urine NEGATIVE NEGATIVE    Specific Gravity, UA 1.017 1.005 - 1.030    Urine Hgb NEGATIVE NEGATIVE    pH, UA 5.5 5.0 - 8.0    Protein, UA NEGATIVE NEGATIVE    Urobilinogen, Urine Normal Normal    Nitrite, Urine NEGATIVE NEGATIVE    Leukocyte Esterase, Urine NEGATIVE NEGATIVE    -          WBC, UA None 0 - 5 /HPF    RBC, UA 0 TO 2 0 - 4 /HPF    Casts UA NOT REPORTED 0 - 8 /LPF    Crystals, UA NOT REPORTED None /HPF    Epithelial Cells UA 0 TO 2 0 - 5 /HPF    Renal Epithelial, UA NOT REPORTED 0 /HPF    Bacteria, UA NOT REPORTED None    Mucus, UA NOT REPORTED None    Trichomonas, UA NOT REPORTED None    Amorphous, UA NOT REPORTED None    Other Observations UA NOT REPORTED NOT REQ. Yeast, UA NOT REPORTED None   Culture, Blood 1    Collection Time: 02/20/22 11:05 AM    Specimen: Blood   Result Value Ref Range    Specimen Description . BLOOD     Special Requests R HAND 20ML     Culture NO GROWTH 12 HOURS    Lactate, Sepsis    Collection Time: 02/20/22 11:14 AM   Result Value Ref Range    Lactic Acid, Sepsis NOT REPORTED 0.5 - 1.9 mmol/L    Lactic Acid, Sepsis, Whole Blood 2.8 (H) 0.5 - 1.9 mmol/L   Lamotrigine Level    Collection Time: 02/20/22 11:14 AM   Result Value Ref Range    Lamotrigine Lvl 1.3 (L) 3.0 - 15.0 ug/mL   Levetiracetam Level    Collection Time: 02/20/22 11:14 AM   Result Value Ref Range    Levetiracetam Lvl 22 ug/mL   Troponin    Collection Time: 02/20/22 11:16 AM   Result Value Ref Range    Troponin, High Sensitivity 57 (HH) 0 - 14 ng/L    Troponin T NOT REPORTED <0.03 ng/mL    Troponin Interp NOT REPORTED    Brain Natriuretic Peptide    Collection Time: 02/20/22 11:16 AM   Result Value Ref Range    Pro- (H) <300 pg/mL    BNP Interpretation NOT REPORTED    Culture, Blood 2    Collection Time: 02/20/22 11:20 AM    Specimen: Blood   Result Value Ref Range    Specimen Description . BLOOD     Special Requests L HAND 2ML     Culture NO GROWTH 12 HOURS    EKG 12 Lead    Collection Time: 02/20/22  1:01 PM   Result Value Ref Range    Ventricular Rate 99 BPM    Atrial Rate 99 BPM    P-R Interval 172 ms    QRS Duration 92 ms    Q-T Interval 364 ms    QTc Calculation (Bazett) 467 ms    P Axis 45 degrees    R Axis -5 degrees    T Axis 36 degrees   Lactate, Sepsis    Collection Time: 02/20/22  1:03 PM   Result Value Ref Range    Lactic Acid, Sepsis NOT REPORTED 0.5 - 1.9 mmol/L    Lactic Acid, Sepsis, Whole Blood 1.5 0.5 - 1.9 mmol/L   Lamotrigine Level    Collection Time: 02/20/22 10:48 PM   Result Value Ref Range    Lamotrigine Lvl 1.3 (L) 3.0 - 15.0 ug/mL   Levetiracetam Level    Collection Time: 02/20/22 10:48 PM   Result Value Ref Range    Levetiracetam Lvl 4 ug/mL   Troponin    Collection Time: 02/20/22 10:48 PM   Result Value Ref Range    Troponin, High Sensitivity 17 (H) 0 - 14 ng/L    Troponin T NOT REPORTED <0.03 ng/mL    Troponin Interp NOT REPORTED    Prolactin    Collection Time: 02/20/22 10:48 PM   Result Value Ref Range    Prolactin 4.52 (L) 4.79 - 23.30 ug/L       Imaging:   CT Head WO Contrast    Result Date: 2/20/2022  No acute intracranial abnormality. XR CHEST PORTABLE    Result Date: 2/20/2022  Chronic findings in the chest without acute airspace disease identified. CT CHEST PULMONARY EMBOLISM W CONTRAST    Result Date: 2/20/2022  1. No evidence for acute pulmonary embolism. 2.  Bilateral pulmonary nodules, thoracic lymphadenopathy, soft tissue calcifications in the chest and upper abdomen and sclerotic bone lesions again demonstrated, as described previously, which may be related to the patient's history of malignancy. 3.  Small pericardial effusion. Trace left pleural effusion. 4.  Mild septal thickening suggestive of interstitial edema. ASSESSMENT & PLAN     ASSESSMENT / PLAN:     Principal Problem:    Dehydration  Active Problems:    Seizure ([address]    Type 2 diabetes mellitus without complication, without long-term current use of insulin (HCC)    Cancer, metastatic to bone (HCC)    Fatigue    Chronic deep vein thrombosis (DVT) of femoral vein of left lower extremity (HCC)    Ovarian cancer (HCC)    AMS (altered mental status)    Lactic acidosis    Ileus (HCC)    Elevated troponin    Pericardial effusion    Hypokalemia    Acute respiratory failure with hypoxia (HCC)  Resolved Problems:    * No resolved hospital problems. *    IMPRESSION  This is a 60 y.o. female with a PMH of ovarian cancer with metastasis post chemotherapy/radiotherapy couple of days ago  presented with N/V, chills, fatigue, weakess and altered mental status. Patient also has elevated troponins for which cardiology is on board and new leukocytosis for which hematology is following. Patient admitted to inpatient status for further managament. 1.  Ileus: X-ray KUB with impression of probable gallstone. Catheter left abdominal and pelvis. Plan is to keep NPO with IVF dehydration along with NG tube. Although patient had nasal bleeding so ordered oxymetazoline nasal spray.   NG tube reattempt again after consulting with IR tomorrow. CT abdomen pelvis with and without contrast ordered in the a.m, follow-up. Needs general surgery consult. 2.  Dehydration: Likely secondary to emesis. IVF 0.9% NACL infusion at 75 ml/hr. 3. Seizures: Resume her home Lamictal and Keppra. EEG ordered. Consult neurology. 4. Type 2 diabetes mellitus without complication, without long-term current use of insulin ([address]? Glucose 150. Follow BG. Hypoglycemia protocol. 5. Leukocytosis: Likely secondary to UTI given that she has dysuria. Pro calcitonin to rule out sepsis. Follow-up pancultures. We will consult ID. Yes but  6 . History of hypertension: Placed on IV labetalol every 6 hours as needed. 7.  Hypokalemia: Replace to keep K > 4.  8.  Acute respiratory failure with hypoxia with unclear etiology: Not on oxygen at home. Supplemental oxygen as needed and wean down as tolerated. Follow-up on chest x-ray and echo. Incentive spirometry and RT aerosol protocol. Albuterol nebulizer 2.5 mg as needed for wheezing. 9.  Lactic acidosis with probable sepsis: Lactic acid was 2.8 which trended down to 1.5. Continue IVF. 10.  Chronic DVT of femoral vein of lower extremity: Patient is on Eliquis at home which is continued. DVT ppx: Eliquis  GI ppx: Protonix    PT/OT/SW: Consulted  Discharge Planning: Case management consulted. Lewis Powell MD  Internal Medicine Resident, PGY-1  [facility]; [place], [place]  2/21/2022, 1:57 AM   Attending Physician Statement  I have discussed the care of [address] [place], [place] [address]S [place], including pertinent history and exam findings,  with the resident. I have seen and examined the patient and the key elements of all parts of the encounter have been performed by me. I agree with the assessment, plan and orders as documented by the resident with additions . Treatment plan Discussed with nursing staff in detail , all questions answered .    Electronically signed by Troy Nieves MD on   2/21/22 at 3:48 PM EST    Please note that this chart was generated using voice recognition Dragon dictation software. Although every effort was made to ensure the accuracy of this automated transcription, some errors in transcription may have occurred.

## 2022-02-21 NOTE — PROGRESS NOTES
Occupational [address]  Occupational Therapy Not Seen Note    DATE: 2022    NAME: Gianni Gonzalez  MRN: [id number]   : 1965      Patient not seen this date for Occupational Therapy due to:    Patient Declined: Pt politely declined occupational therapy services this AM d/t significant pain and nausea per patient. Pt extensively encouraged and educated on importance and value of EOB/OOB activity with therapy; however, pt continued to decline. Pt requested OT checks back tomorrow.      Next Scheduled Treatment: Check back as able     Electronically signed by Odell rOona OT on 2022 at 10:46 AM

## 2022-02-21 NOTE — PROGRESS NOTES
VANIA CARMICHAEL, [facility]. Dehydration [E86.0]  Confusion [R41.0]  Hypoxia [R09.02]  Fatigue, unspecified type [R53.83]  AMS (altered mental status) [R41.82] . Vitals:    02/21/22 1049   BP: (!) 137/45   Pulse: 68   Resp: 16   Temp: 99.2 °F (37.3 °C)   SpO2: 98%   . Patients home meds are   Prior to Admission medications    Medication Sig Start Date End Date Taking? Authorizing Provider   LORazepam (ATIVAN) 1 MG tablet Take 1 tablet by mouth every 8 hours as needed for Anxiety for up to 30 doses. 2/18/22 4/2/22  Josy Guzman MD   polyethylene glycol (MIRALAX) 17 g packet Take 17 g by mouth daily as needed for Constipation 2/18/22 3/20/22  Josy Guzman MD   lamoTRIgine (LAMICTAL) 25 MG tablet TAKE 3 TABS IN IN THE MORNING AND 3 TABS IN IN THE EVENING 2/9/22   Jr Swan MD   levETIRAcetam (KEPPRA) 750 MG tablet Take 2 tablets by mouth 2 times daily 2/9/22   Kina Flowers MD   diazePAM 5 MG/0.1ML LIQD 1 puff by Nasal route as needed (seizures)  Patient not taking: Reported on 2/18/2022 2/9/22   Kina Flowers MD   morphine (MS CONTIN) 30 MG extended release tablet TAKE 1 TABLET BY MOUTH 2 TIMES DAILY FOR 30 DAYS. 1/27/22 2/26/22  Sarahi Bhatia MD   morphine (MS CONTIN) 15 MG extended release tablet TAKE 1 TABLET BY MOUTH 2 TIMES DAILY FOR 30 DAYS.  1/27/22 2/26/22  Josy Guzman MD   oxyCODONE-acetaminophen (PERCOCET) 5-325 MG per tablet TAKE 1 TABLET BY MOUTH EVERY 4 HOURS AS NEEDED FOR PAIN (BREAKTHROUGH PAIN) - REDUCE DOSES TAKEN AS PAIN BECOMES MANAGEABLE 1/27/22 2/26/22  Josy Guzman MD   pantoprazole (PROTONIX) 40 MG tablet Take 1 tablet by mouth daily 1/3/22   Josy Guzman MD   ondansetron (ZOFRAN-ODT) 4 MG disintegrating tablet Take 1 tablet by mouth every 8 hours as needed for Nausea or Vomiting 12/3/21   Josy Guzman MD   naloxegol (MOVANTIK) 12.5 MG TABS tablet Take 1 tablet by mouth every morning  Patient not taking: Reported on 2/18/2022 11/15/21   Abby Humphries MD   ferrous sulfate (IRON 325) 325 (65 Fe) MG tablet Take 1 tablet by mouth daily (with breakfast) 10/31/21   Terry Watkins MD   morphine (MSIR) 30 MG tablet Take 30 mg by mouth 2 times daily as needed for Pain.     Historical Provider, MD   sertraline (ZOLOFT) 100 MG tablet TAKE 1 TABLET BY MOUTH DAILY 10/4/21 2/18/22  Abby Humphries MD   ELIQUIS 5 MG TABS tablet Take 5 mg by mouth 2 times daily  5/26/21   Historical Provider, MD   .  Recent Surgical History: None = 0     Assessment     RR 18  Breath Sounds: clear t/o      Bronchodilator assessment at level  1    []    Bronchodilator Assessment  BRONCHODILATOR ASSESSMENT SCORE  Score 0 1 2 3 4 5   Breath Sounds   []  Patient Baseline [x]  No Wheeze good aeration []  Faint, scattered wheezing, good aeration []  Expiratory Wheezing and or moderately diminished []  Insp/Exp wheeze and/or very diminished []  Insp/Exp and/ or marked distress   Respiratory Rate   []  Patient Baseline [x]  Less than 20 []  Less than 20 []  20-25 []  Greater than 25 []  Greater than 25   Peak flow % of Pred or PB []  NA   []  Greater than 90%  []  81-90% []  71-80% []  Less than or equal to 70%  or unable to perform []  Unable due to Respiratory Distress   Dyspnea re []  Patient Baseline [x]  No SOB []  No SOB []  SOB on exertion []  SOB min activity []  At rest/acute   e FEV% Predicted       []  NA []  Above 69%  []  Unable []  Above 60-69%  []  Unable []  Above 50-59%  []  Unable []  Above 35-49%  []  Unable []  Less than 35%  []  Unable            VANIA CARMICHAEL RCP  11:06 AM                            FEMALE                                  MALE                            FEV1 Predicted Normal Values                        FEV1 Predicted Normal Values          Age                                     Height in Feet and Inches       Age                                     Height in Feet and Inches       4' 11\" 5' 1\" 5' 3\" 5' 5\" 5' 7\" 5' 9\" 5' 11\" 6' 1\"  4' 11\" 5' 1\" 5' 3\" 5' 5\" 5' 7\" 5' 9\" 5' 11\" 6' 1\"   42 - 45 2.49 2.66 2.84 3.03 3.22 3.42 3.62 3.83 42 - 45 2.82 3.03 3.26 3.49 3.72 3.96 4.22 4.47   46 - 49 2.40 2.57 2.76 2.94 3.14 3.33 3.54 3.75 46 - 49 2.70 2.92 3.14 3.37 3.61 3.85 4.10 4.36   50 - 53 2.31 2.48 2.66 2.85 3.04 3.24 3.45 3.66 50 - 53 2.58 2.80 3.02 3.25 3.49 3.73 3.98 4.24   54 - 57 2.21 2.38 2.57 2.75 2.95 3.14 3.35 3.56 54 - 57 2.46 2.67 2.89 3.12 3.36 3.60 3.85 4.11   58 - 61 2.10 2.28 2.46 2.65 2.84 3.04 3.24 3.45 58 - 61 2.32 2.54 2.76 2.99 3.23 3.47 3.72 3.98   62 - 65 1.99 2.17 2.35 2.54 2.73 2.93 3.13 3.34 62 - 65 2.19 2.40 2.62 2.85 3.09 3.33 3.58 3.84   66 - 69 1.88 2.05 2.23 2.42 2.61 2.81 3.02 3.23 66 - 69 2.04 2.26 2.48 2.71 2.95 3.19 3.44 3.70   70+ 1.82 1.99 2.17 2.36 2.55 2.75 2.95 3.16 70+ 1.97 2.19 2.41 2.64 2.87 3.12 3.37 3.62             Predicted Peak Expiratory Flow Rate                                       Height (in)  Female       Height (in) Male           Age 64 62 64 63 57 71 78 74 Age            55 428 348 312 706 296 508 862 535  52 71 74 36 06 56 90 12 95   25 337 352 366 381 396 411 426 441 25 447 476 505 533 562 591 619 648 677   30 329 344 359 374 389 404 419 434 30 437 466 494 523 552 580 609 638 667   35 322 337 351 366 381 396 411 426 35 426 455 484 512 541 570 598 627 657   40 314 329 344 359 374 389 404 419 40 416 445 473 502 531 559 588 617 647   45 307 322 336 351 366 381 396 411 45 405 434 463 491 520 549 577 606 636   50 299 314 329 344 359 374 389 404 50 395 424 452 481 510 538 567 596 625   55 292 307 321 336 351 366 381 396 55 384 413 442 470 499 528 556 585 615   60 284 299 314 329 344 359 374 389 60 374 403 431 460 489 517 546 575 605   65 277 292 306 321 336 351 366 381 65 363 392 421 449 478 507 535 564 594   70 269 284 299 314 329 344 359 374 70 353 382 410 439 468 496 525 554 583   75 261 274 289 305 319 334 348 364 75 344 372 400 429 458 487 515 544 573   80 253 266 282 296 312 327 342 356 80 335 362 390 419 459 898 010 583 126

## 2022-02-21 NOTE — PROGRESS NOTES
Pt arrives to room for placement of NGT  JR DAVID and SM RT to bedside  12F sump placed to LN without difficulty  Tolerated well  Secured at  74cm With holister  Return to floor

## 2022-02-21 NOTE — CONSULTS
Today's Date: 2/21/2022  Patient Name: Rafaela Severance  Date of admission: 2/20/2022  9:53 AM  Patient's age: 60 y.o., 1965  Admission Dx: Dehydration [E86.0]  Confusion [R41.0]  Hypoxia [R09.02]  Fatigue, unspecified type [R53.83]  AMS (altered mental status) [R41.82]    Reason for Consult: management recommendations  Requesting Physician: Yordan Wright MD    CHIEF COMPLAINT: Nausea vomiting abdominal pain. History Obtained From:  patient, electronic medical record    HISTORY OF PRESENT ILLNESS:      The patient is a 60 y.o.  female who is admitted to the hospital for chief complains abdominal pain nausea vomiting which started yesterday. Patient's oncologic history is as below. Patient also recently had a DVT for which she has been started on Eliquis. Patient had a KUB done which shows findings of ileus. Patient last CT abdomen pelvis from 10/29. Patient is also on antiseizure medication. Patient is admitted with above complaints. Underwent placement of NG tube per IR. Patient is well-known to our practice. She has history of recurrent ovarian cancer. Patient is currently on cytotoxic therapy using Gemzar and has been tolerating it well. Patient last Ca1 25 was relatively stable. Patient CT chest from 2/20/2022 shows bilateral lung nodules thoracic adenopathy and sclerotic bony lesions      Past Medical History:   has a past medical history of Anemia, Bleeding, Cervical cancer ([address] Depression, Diabetes mellitus ([address] GERD (gastroesophageal reflux disease), Hx of blood clots, Hypertension, Metastatic cancer ([address] Ovarian cancer ([address] Post chemo evaluation, and Splenic lesion. Past Surgical History:   has a past surgical history that includes Hysterectomy, total abdominal; Port Surgery; Tonsillectomy; IR PORT PLACEMENT > 5 YEARS (08/24/2020); Anus surgery; Abscess Drainage (2013); colectomy (03/2013);  IR EMBOLIZATION HEMORRHAGE (10/05/2020); and Cardiac catheterization. Medications:    Prior to Admission medications    Medication Sig Start Date End Date Taking? Authorizing Provider   LORazepam (ATIVAN) 1 MG tablet Take 1 tablet by mouth every 8 hours as needed for Anxiety for up to 30 doses. 2/18/22 4/2/22  Chris Lima MD   polyethylene glycol (MIRALAX) 17 g packet Take 17 g by mouth daily as needed for Constipation 2/18/22 3/20/22  Chris Lima MD   lamoTRIgine (LAMICTAL) 25 MG tablet TAKE 3 TABS IN IN THE MORNING AND 3 TABS IN IN THE EVENING 2/9/22   Jr Bush MD   levETIRAcetam (KEPPRA) 750 MG tablet Take 2 tablets by mouth 2 times daily 2/9/22   Bess Pena MD   diazePAM 5 MG/0.1ML LIQD 1 puff by Nasal route as needed (seizures)  Patient not taking: Reported on 2/18/2022 2/9/22   Bess Pena MD   morphine (MS CONTIN) 30 MG extended release tablet TAKE 1 TABLET BY MOUTH 2 TIMES DAILY FOR 30 DAYS. 1/27/22 2/26/22  Colton Bhatia MD   morphine (MS CONTIN) 15 MG extended release tablet TAKE 1 TABLET BY MOUTH 2 TIMES DAILY FOR 30 DAYS. 1/27/22 2/26/22  Chris Lima MD   oxyCODONE-acetaminophen (PERCOCET) 5-325 MG per tablet TAKE 1 TABLET BY MOUTH EVERY 4 HOURS AS NEEDED FOR PAIN (BREAKTHROUGH PAIN) - REDUCE DOSES TAKEN AS PAIN BECOMES MANAGEABLE 1/27/22 2/26/22  Chris Lima MD   pantoprazole (PROTONIX) 40 MG tablet Take 1 tablet by mouth daily 1/3/22   Chris Lima MD   ondansetron (ZOFRAN-ODT) 4 MG disintegrating tablet Take 1 tablet by mouth every 8 hours as needed for Nausea or Vomiting 12/3/21   Chris Liam MD   naloxegol (MOVANTIK) 12.5 MG TABS tablet Take 1 tablet by mouth every morning  Patient not taking: Reported on 2/18/2022 11/15/21   Chris Lima MD   ferrous sulfate (IRON 325) 325 (65 Fe) MG tablet Take 1 tablet by mouth daily (with breakfast) 10/31/21   Patricia Mcdaniel MD   morphine (MSIR) 30 MG tablet Take 30 mg by mouth 2 times daily as needed for Pain.     Historical Provider, MD   sertraline (ZOLOFT) 100 MG tablet TAKE 1 TABLET BY MOUTH DAILY 10/4/21 2/18/22  Fang Patel MD   ELIQUIS 5 MG TABS tablet Take 5 mg by mouth 2 times daily  5/26/21   Historical Provider, MD     Current Facility-Administered Medications   Medication Dose Route Frequency Provider Last Rate Last Admin    bisacodyl (DULCOLAX) suppository 10 mg  10 mg Rectal Daily PRN Raina Servin MD        enoxaparin (LOVENOX) injection 40 mg  40 mg SubCUTAneous BID Raina Servin MD        oxymetazoline (AFRIN) 0.05 % nasal spray 2 spray  2 spray Each Nostril BID Raina Servin MD   2 spray at 02/21/22 1013    albuterol (PROVENTIL) nebulizer solution 2.5 mg  2.5 mg Nebulization As Directed RT PRN Raina Servin MD        ampicillin-sulbactam (UNASYN) 3000 mg ivpb minibag  3,000 mg IntraVENous Q6H Raina Servin  mL/hr at 02/21/22 1654 3,000 mg at 02/21/22 1654    LORazepam (ATIVAN) tablet 1 mg  1 mg Oral Q8H PRN Deborah Alberto MD        sodium chloride flush 0.9 % injection 5-40 mL  5-40 mL IntraVENous 2 times per day Elvi Ridanish, DO   10 mL at 02/20/22 2023    sodium chloride flush 0.9 % injection 5-40 mL  5-40 mL IntraVENous PRN Elvi Brown, DO        0.9 % sodium chloride infusion  25 mL IntraVENous PRN Elvi Brown, DO        sodium chloride flush 0.9 % injection 5-40 mL  5-40 mL IntraVENous 2 times per day Raina Servin MD   10 mL at 02/20/22 2306    sodium chloride flush 0.9 % injection 5-40 mL  5-40 mL IntraVENous PRN Raina Servin MD        0.9 % sodium chloride infusion  25 mL IntraVENous PRN Raina Servin MD        acetaminophen (TYLENOL) tablet 650 mg  650 mg Oral Q6H PRN Raina Servin MD        Or    acetaminophen (TYLENOL) suppository 650 mg  650 mg Rectal Q6H PRN Raina Servin MD        0.9 % sodium chloride infusion   IntraVENous Continuous Raina Servin MD 75 mL/hr at 02/21/22 1423 New Bag at 02/21/22 1423    potassium chloride (KLOR-CON M) extended release tablet 40 mEq  40 mEq Oral PRN Raina Servin MD        Or    potassium bicarb-citric acid (EFFER-K) effervescent tablet 40 mEq  40 mEq Oral PRN Raina Servin MD        Or    potassium chloride 10 mEq/100 mL IVPB (Peripheral Line)  10 mEq IntraVENous PRN Raina Servin MD        lamoTRIgine (LAMICTAL) tablet 75 mg  75 mg Oral BID Raina Servin MD        levETIRAcetam (KEPPRA) 1500 mg/100 mL IVPB  1,500 mg IntraVENous Q12H Raina Servin MD   Stopped at 02/21/22 1316    morphine (PF) injection 1 mg  1 mg IntraVENous Q4H PRN Raina Servin MD   1 mg at 02/21/22 1421    pantoprazole (PROTONIX) injection 40 mg  40 mg IntraVENous Daily Raina Servin MD   40 mg at 02/21/22 1003    And    sodium chloride (PF) 0.9 % injection 10 mL  10 mL IntraVENous Daily Raina Servin MD   10 mL at 02/21/22 1010    promethazine (PHENERGAN) tablet 12.5 mg  12.5 mg Oral Q6H PRN Raina Servin MD   12.5 mg at 02/20/22 2305    Or    ondansetron (ZOFRAN) injection 4 mg  4 mg IntraVENous Q6H PRN Raina Servin MD   4 mg at 02/21/22 1650    labetalol (NORMODYNE;TRANDATE) injection 10 mg  10 mg IntraVENous Q6H PRN Raina Servin MD           Allergies:  Ceftriaxone    Social History:   reports that she has been smoking cigarettes. She has been smoking about 1.00 pack per day. She uses smokeless tobacco. She reports previous alcohol use. She reports that she does not use drugs. Family History: family history includes Alcohol Abuse in her mother; Cirrhosis in her mother. REVIEW OF SYSTEMS:      Constitutional: No fever or chills. No night sweats, no weight loss   Eyes: No eye discharge, double vision, or eye pain   HEENT: negative for sore mouth, sore throat, hoarseness and voice change   Respiratory: negative for cough , sputum, dyspnea, wheezing, hemoptysis, chest pain   Cardiovascular: negative for chest pain, dyspnea, palpitations, orthopnea, PND   Gastrointestinal: Positive for nausea vomiting.   Genitourinary: negative for frequency, dysuria, nocturia, urinary incontinence, and hematuria   Integument: negative for rash, skin lesions, bruises. Hematologic/Lymphatic: negative for easy bruising, bleeding, lymphadenopathy, or petechiae   Endocrine: negative for heat or cold intolerance,weight changes, change in bowel habits and hair loss   Musculoskeletal: negative for myalgias, arthralgias, pain, joint swelling,and bone pain   Neurological: negative for headaches, dizziness, seizures, weakness, numbness    PHYSICAL EXAM:        BP (!) 137/45   Pulse 68   Temp 99.2 °F (37.3 °C) (Oral)   Resp 16   SpO2 98%    Temp (24hrs), Av °F (37.2 °C), Min:98.8 °F (37.1 °C), Max:99.2 °F (37.3 °C)      General appearance -sick appearing, no in pain or distress   Mental status - alert and cooperative   Eyes - pupils equal and reactive, extraocular eye movements intact   Ears - bilateral TM's and external ear canals normal   Mouth - mucous membranes moist, pharynx normal without lesions. NG tube in place  Neck - supple, no significant adenopathy   Lymphatics - no palpable lymphadenopathy, no hepatosplenomegaly   Chest - clear to auscultation, no wheezes, rales or rhonchi, symmetric air entry   Heart - normal rate, regular rhythm, normal S1, S2, no murmurs  Abdomen - soft, nontender, nondistended, no masses or organomegaly   Neurological - alert, oriented, normal speech, no focal findings or movement disorder noted   Musculoskeletal - no joint tenderness, deformity or swelling   Extremities - peripheral pulses normal, no pedal edema, no clubbing or cyanosis   Skin - normal coloration and turgor, no rashes, no suspicious skin lesions noted ,      DATA:      Labs:     Results for orders placed or performed during the hospital encounter of 22   Culture, Urine    Specimen: Urine, clean catch   Result Value Ref Range    Specimen Description . CLEAN CATCH URINE     Special Requests NOT REPORTED     Culture ESCHERICHIA COLI 10 to 50,000 CFU/ML (A)    Culture, Blood 2    Specimen: Blood   Result Value Ref Range    Specimen Description . BLOOD     Special Requests L HAND 2ML     Culture NO GROWTH 1 DAY    Culture, Blood 1    Specimen: Blood   Result Value Ref Range    Specimen Description . BLOOD     Special Requests R HAND 20ML     Culture NO GROWTH 1 DAY    CBC with Auto Differential   Result Value Ref Range    WBC 20.0 (H) 3.5 - 11.3 k/uL    RBC 4.08 3.95 - 5.11 m/uL    Hemoglobin 11.9 11.9 - 15.1 g/dL    Hematocrit 39.5 36.3 - 47.1 %    MCV 96.8 82.6 - 102.9 fL    MCH 29.2 25.2 - 33.5 pg    MCHC 30.1 28.4 - 34.8 g/dL    RDW 17.7 (H) 11.8 - 14.4 %    Platelets 408 (H) 003 - 453 k/uL    MPV 9.3 8.1 - 13.5 fL    NRBC Automated 0.0 0.0 per 100 WBC    Differential Type NOT REPORTED     WBC Morphology NOT REPORTED     RBC Morphology NOT REPORTED     Platelet Estimate NOT REPORTED     Immature Granulocytes 0 0 %    Seg Neutrophils 88 (H) 36 - 66 %    Lymphocytes 1 (L) 24 - 44 %    Monocytes 11 (H) 1 - 7 %    Eosinophils % 0 (L) 1 - 4 %    Basophils 0 0 - 2 %    Absolute Immature Granulocyte 0.00 0.00 - 0.30 k/uL    Segs Absolute 17.60 (H) 1.8 - 7.7 k/uL    Absolute Lymph # 0.20 (L) 1.0 - 4.8 k/uL    Absolute Mono # 2.20 (H) 0.1 - 0.8 k/uL    Absolute Eos # 0.00 0.0 - 0.4 k/uL    Basophils Absolute 0.00 0.0 - 0.2 k/uL    Morphology ANISOCYTOSIS PRESENT    Comprehensive Metabolic Panel w/ Reflex to MG   Result Value Ref Range    Glucose 150 (H) 70 - 99 mg/dL    BUN 13 6 - 20 mg/dL    CREATININE 0.47 (L) 0.50 - 0.90 mg/dL    Bun/Cre Ratio NOT REPORTED 9 - 20    Calcium 9.0 8.6 - 10.4 mg/dL    Sodium 142 135 - 144 mmol/L    Potassium 3.4 (L) 3.7 - 5.3 mmol/L    Chloride 103 98 - 107 mmol/L    CO2 22 20 - 31 mmol/L    Anion Gap 17 9 - 17 mmol/L    Alkaline Phosphatase 65 35 - 104 U/L    ALT 7 5 - 33 U/L    AST 12 <32 U/L    Total Bilirubin 0.16 (L) 0.3 - 1.2 mg/dL    Total Protein 6.7 6.4 - 8.3 g/dL    Albumin 4.2 3.5 - 5.2 g/dL    Albumin/Globulin Ratio 1.7 1.0 - 2.5    GFR Non-African American >60 >60 mL/min    GFR  >60 >60 mL/min    GFR Comment          GFR Staging NOT REPORTED    Lipase   Result Value Ref Range    Lipase 13 13 - 60 U/L   Troponin   Result Value Ref Range    Troponin, High Sensitivity 51 (H) 0 - 14 ng/L    Troponin T NOT REPORTED <0.03 ng/mL    Troponin Interp NOT REPORTED    Urinalysis with Microscopic   Result Value Ref Range    Color, UA Yellow Yellow    Turbidity UA Turbid (A) Clear    Glucose, Ur 2+ (A) NEGATIVE    Bilirubin Urine NEGATIVE NEGATIVE    Ketones, Urine NEGATIVE NEGATIVE    Specific Gravity, UA 1.017 1.005 - 1.030    Urine Hgb NEGATIVE NEGATIVE    pH, UA 5.5 5.0 - 8.0    Protein, UA NEGATIVE NEGATIVE    Urobilinogen, Urine Normal Normal    Nitrite, Urine NEGATIVE NEGATIVE    Leukocyte Esterase, Urine NEGATIVE NEGATIVE    -          WBC, UA None 0 - 5 /HPF    RBC, UA 0 TO 2 0 - 4 /HPF    Casts UA NOT REPORTED 0 - 8 /LPF    Crystals, UA NOT REPORTED None /HPF    Epithelial Cells UA 0 TO 2 0 - 5 /HPF    Renal Epithelial, UA NOT REPORTED 0 /HPF    Bacteria, UA NOT REPORTED None    Mucus, UA NOT REPORTED None    Trichomonas, UA NOT REPORTED None    Amorphous, UA NOT REPORTED None    Other Observations UA NOT REPORTED NOT REQ.     Yeast, UA NOT REPORTED None   Lactate, Sepsis   Result Value Ref Range    Lactic Acid, Sepsis NOT REPORTED 0.5 - 1.9 mmol/L    Lactic Acid, Sepsis, Whole Blood 2.8 (H) 0.5 - 1.9 mmol/L   Lactate, Sepsis   Result Value Ref Range    Lactic Acid, Sepsis NOT REPORTED 0.5 - 1.9 mmol/L    Lactic Acid, Sepsis, Whole Blood 1.5 0.5 - 1.9 mmol/L   Lamotrigine Level   Result Value Ref Range    Lamotrigine Lvl 1.3 (L) 3.0 - 15.0 ug/mL   Levetiracetam Level   Result Value Ref Range    Levetiracetam Lvl 22 ug/mL   Troponin   Result Value Ref Range    Troponin, High Sensitivity 57 (HH) 0 - 14 ng/L    Troponin T NOT REPORTED <0.03 ng/mL    Troponin Interp NOT REPORTED    Magnesium   Result Value Ref Range    Magnesium 1.8 1.6 - 2.6 mg/dL   Brain Natriuretic Peptide Result Value Ref Range    Pro- (H) <300 pg/mL    BNP Interpretation NOT REPORTED    Basic Metabolic Panel w/ Reflex to MG   Result Value Ref Range    Glucose 109 (H) 70 - 99 mg/dL    BUN 8 6 - 20 mg/dL    CREATININE 0.39 (L) 0.50 - 0.90 mg/dL    Bun/Cre Ratio NOT REPORTED 9 - 20    Calcium 8.9 8.6 - 10.4 mg/dL    Sodium 137 135 - 144 mmol/L    Potassium 3.6 (L) 3.7 - 5.3 mmol/L    Chloride 103 98 - 107 mmol/L    CO2 22 20 - 31 mmol/L    Anion Gap 12 9 - 17 mmol/L    GFR Non-African American >60 >60 mL/min    GFR African American >60 >60 mL/min    GFR Comment          GFR Staging NOT REPORTED    CBC with Auto Differential   Result Value Ref Range    WBC 14.1 (H) 3.5 - 11.3 k/uL    RBC 3.56 (L) 3.95 - 5.11 m/uL    Hemoglobin 10.5 (L) 11.9 - 15.1 g/dL    Hematocrit 33.7 (L) 36.3 - 47.1 %    MCV 94.7 82.6 - 102.9 fL    MCH 29.5 25.2 - 33.5 pg    MCHC 31.2 28.4 - 34.8 g/dL    RDW 17.4 (H) 11.8 - 14.4 %    Platelets 585 (H) 017 - 453 k/uL    MPV 9.4 8.1 - 13.5 fL    NRBC Automated 0.0 0.0 per 100 WBC    Differential Type NOT REPORTED     WBC Morphology NOT REPORTED     RBC Morphology ANISOCYTOSIS PRESENT     Platelet Estimate NOT REPORTED     Seg Neutrophils 91 (H) 36 - 65 %    Lymphocytes 6 (L) 24 - 43 %    Monocytes 3 3 - 12 %    Eosinophils % 0 (L) 1 - 4 %    Basophils 0 0 - 2 %    Immature Granulocytes 1 (H) 0 %    Segs Absolute 12.77 (H) 1.50 - 8.10 k/uL    Absolute Lymph # 0.78 (L) 1.10 - 3.70 k/uL    Absolute Mono # 0.38 0.10 - 1.20 k/uL    Absolute Eos # <0.03 0.00 - 0.44 k/uL    Basophils Absolute 0.05 0.00 - 0.20 k/uL    Absolute Immature Granulocyte 0.08 0.00 - 0.30 k/uL   Lamotrigine Level   Result Value Ref Range    Lamotrigine Lvl 1.3 (L) 3.0 - 15.0 ug/mL   Levetiracetam Level   Result Value Ref Range    Levetiracetam Lvl 4 ug/mL   DRUG SCREEN MULTI URINE   Result Value Ref Range    Amphetamine Screen, Ur NEGATIVE NEGATIVE    Barbiturate Screen, Ur NEGATIVE NEGATIVE    Benzodiazepine Screen, Urine NEGATIVE NEGATIVE    Cocaine Metabolite, Urine NEGATIVE NEGATIVE    Methadone Screen, Urine NEGATIVE NEGATIVE    Opiates, Urine POSITIVE (A) NEGATIVE    Phencyclidine, Urine NEGATIVE NEGATIVE    Propoxyphene, Urine NOT REPORTED NEGATIVE    Cannabinoid Scrn, Ur NEGATIVE NEGATIVE    Oxycodone Screen, Ur NEGATIVE NEGATIVE    Methamphetamine, Urine NOT REPORTED NEGATIVE    Tricyclic Antidepressants, Urine NOT REPORTED NEGATIVE    MDMA, Urine NOT REPORTED NEGATIVE    Buprenorphine Urine NOT REPORTED NEGATIVE    Test Information       Assay provides medical screening only. The absence of expected drug(s) and/or metabolite(s) may indicate diluted or adulterated urine, limitations of testing or timing of collection.    Troponin   Result Value Ref Range    Troponin, High Sensitivity 17 (H) 0 - 14 ng/L    Troponin T NOT REPORTED <0.03 ng/mL    Troponin Interp NOT REPORTED    Troponin   Result Value Ref Range    Troponin, High Sensitivity 14 0 - 14 ng/L    Troponin T NOT REPORTED <0.03 ng/mL    Troponin Interp NOT REPORTED    Prolactin   Result Value Ref Range    Prolactin 4.52 (L) 4.79 - 23.30 ug/L   Procalcitonin   Result Value Ref Range    Procalcitonin 0.25 (H) <0.09 ng/mL   Troponin   Result Value Ref Range    Troponin, High Sensitivity 16 (H) 0 - 14 ng/L    Troponin T NOT REPORTED <0.03 ng/mL    Troponin Interp NOT REPORTED    CK   Result Value Ref Range    Total  26 - 192 U/L   Myoglobin, Serum   Result Value Ref Range    Myoglobin 62 (H) 25 - 58 ng/mL   EKG 12 Lead   Result Value Ref Range    Ventricular Rate 90 BPM    Atrial Rate 90 BPM    P-R Interval 162 ms    QRS Duration 90 ms    Q-T Interval 386 ms    QTc Calculation (Bazett) 472 ms    P Axis 49 degrees    R Axis 87 degrees    T Axis 55 degrees   EKG 12 Lead   Result Value Ref Range    Ventricular Rate 99 BPM    Atrial Rate 99 BPM    P-R Interval 172 ms    QRS Duration 92 ms    Q-T Interval 364 ms    QTc Calculation (Bazett) 467 ms    P Axis 45 degrees    R Axis -5 degrees    T Axis 36 degrees         IMAGING DATA:    XR ABDOMEN (KUB) (SINGLE AP VIEW)    Result Date: 2/20/2022  EXAMINATION: ONE SUPINE XRAY VIEW(S) OF THE ABDOMEN 2/20/2022 11:18 pm COMPARISON: January 4, 2021 HISTORY: ORDERING SYSTEM PROVIDED HISTORY: rule out ileus TECHNOLOGIST PROVIDED HISTORY: rule out ileus Reason for Exam: supine,nausea,vomiting FINDINGS: 37 mm calcified lesion right upper quadrant may represent a gallstone. New metallic coils noted in the left upper quadrant. A catheter is noted extending from the left upper quadrant to the pelvis and appears unchanged. Gas-filled loops of small large bowel. Increased stool. Osseous structures normal.  Intravenous contrast is noted in the bladder. Ileus. Probable gallstone. Catheter left abdomen and pelvis requires clinical correlation. CT Head WO Contrast    Result Date: 2/20/2022  EXAMINATION: CT OF THE HEAD WITHOUT CONTRAST  2/20/2022 1:44 pm TECHNIQUE: CT of the head was performed without the administration of intravenous contrast. Dose modulation, iterative reconstruction, and/or weight based adjustment of the mA/kV was utilized to reduce the radiation dose to as low as reasonably achievable. COMPARISON: 06/18/2021, MRI 06/18/2021 HISTORY: ORDERING SYSTEM PROVIDED HISTORY:  Eavl for mets TECHNOLOGIST PROVIDED HISTORY: Eavl for mets Decision Support Exception - unselect if not a suspected or confirmed emergency medical condition->Emergency Medical Condition (MA) Reason for Exam:  Eval for mets FINDINGS: BRAIN/VENTRICLES: There is no acute intracranial hemorrhage, mass effect or midline shift. No abnormal extra-axial fluid collection. The gray-white differentiation is maintained without evidence of an acute infarct. There is no evidence of hydrocephalus. Subtle low attenuation in the deep white matter which is nonspecific likely due to chronic small vessel ischemia. Overall appearance is similar to the prior study.   CT with contrast or MRI would be more sensitive to evaluate for metastatic disease. ORBITS: The visualized portion of the orbits demonstrate no acute abnormality. SINUSES: The visualized paranasal sinuses demonstrate no acute abnormality. Tiny retention cyst versus focal mucosal thickening left maxillary sinus. Minimal fluid/opacification left mastoid air cells. SOFT TISSUES/SKULL:  No acute abnormality of the visualized skull or soft tissues. Similar small right temporal flat skin lesion. No acute intracranial abnormality. XR CHEST PORTABLE    Result Date: 2/20/2022  EXAMINATION: ONE XRAY VIEW OF THE CHEST 2/20/2022 11:51 am COMPARISON: 06/22/2021, 06/21/2021 HISTORY: ORDERING SYSTEM PROVIDED HISTORY: eval for pmn TECHNOLOGIST PROVIDED HISTORY: eval for pmn FINDINGS: The cardiomediastinal silhouette is unchanged in appearance. Right internal jugular port in place. Generalized interstitial prominence again noted. There is no consolidation, pneumothorax, or evidence of edema. No effusion is appreciated. The osseous structures are unchanged in appearance. Vascular coil pack in the left upper quadrant. Chronic findings in the chest without acute airspace disease identified. CT CHEST PULMONARY EMBOLISM W CONTRAST    Result Date: 2/20/2022  EXAMINATION: CTA OF THE CHEST 2/20/2022 1:48 pm TECHNIQUE: CTA of the chest was performed after the administration of intravenous contrast.  Multiplanar reformatted images are provided for review. MIP images are provided for review. Dose modulation, iterative reconstruction, and/or weight based adjustment of the mA/kV was utilized to reduce the radiation dose to as low as reasonably achievable. COMPARISON: CT chest 03/09/2021. CT abdomen and pelvis 10/29/2021.  HISTORY: ORDERING SYSTEM PROVIDED HISTORY: eval for pe TECHNOLOGIST PROVIDED HISTORY: eval for pe Decision Support Exception - unselect if not a suspected or confirmed emergency medical condition->Emergency Medical Condition (MA) Reason for Exam: eval for pe FINDINGS: Pulmonary Arteries: Pulmonary arteries are adequately opacified for evaluation. No evidence of intraluminal filling defect to suggest pulmonary embolism. Main pulmonary artery is normal in caliber. Mediastinum: Mildly enlarged confluent hilar lymph nodes and lymphatic tissue in the AP window and subcarinal regions again demonstrated. Small pericardial effusion, similar in appearance. There is no acute abnormality of the thoracic aorta. Right internal jugular port in place. Lungs/pleura: Respiratory motion artifact noted. Mild septal thickening. Persistent nodular opacity in the left lower lobe on axial image 63 measuring up to 2.6 cm. Scattered ground-glass nodules are again demonstrated bilaterally. The amount of left pleural fluid has diminished in the interval with trace amount remaining. Soft tissue calcification near the supra Vancleve IVC again demonstrated. Upper Abdomen: Coarse calcification near the medial and inferior aspect of the spleen partially visualized, as seen previously. Dense metallic artifact corresponding to metallic coil pack near the spleen again demonstrated. Cholelithiasis. Soft Tissues/Bones: Blastic metastatic deposits predominantly in the lower thoracic and upper lumbar spine again demonstrated. 1.  No evidence for acute pulmonary embolism. 2.  Bilateral pulmonary nodules, thoracic lymphadenopathy, soft tissue calcifications in the chest and upper abdomen and sclerotic bone lesions again demonstrated, as described previously, which may be related to the patient's history of malignancy. 3.  Small pericardial effusion. Trace left pleural effusion. 4.  Mild septal thickening suggestive of interstitial edema.      IR PLACE NG TUBE BY DR Tima Toure    Result Date: 2/21/2022  PROCEDURE: XR PLACE NASOGASTRIC TUBE PHYS 2/21/2022 HISTORY: ORDERING SYSTEM PROVIDED HISTORY: ileus TECHNOLOGIST PROVIDED HISTORY: ileus Ileus CONTRAST: None SEDATION: None FLUOROSCOPY DOSE AND TYPE OR TIME AND EXPOSURES: Fluoro time 0.7 minutes  cGy cm 2 DESCRIPTION OF PROCEDURE: This procedure was performed by Leti DAVID under indirect supervision of . Salt[place] protocol was observed. An attempt was made to pass a 12 [place] sump through right and left nostrils without success due to stricture in the nasal cavity. Under fluoroscopic guidance, through the left nostril, a 12 [place] sump nasogastric tube was negotiated into the stomach. With catheter manipulation, the tip of the catheter was manipulated into the distal stomach/proximal duodenum. Air bolus administration confirmed satisfactory tip position. The catheter was secured appropriately at 74 cm. Estimated blood loss was 0 mL. The patient tolerated the procedure well. Successful placement of nasogastric tube. Okay to use.          IMPRESSION:   Primary Problem  Dehydration    Active Hospital Problems    Diagnosis Date Noted    Lactic acidosis [E87.2] 02/21/2022    Ileus ([address] [K56.7] 02/21/2022    Elevated troponin [R77.8] 02/21/2022    Pericardial effusion [I31.3] 02/21/2022    Hypokalemia [E87.6] 02/21/2022    Acute respiratory failure with hypoxia (HCC) [J96.01] 02/21/2022    Dehydration [E86.0] 02/20/2022    AMS (altered mental status) [R41.82] 02/20/2022    Ovarian cancer ([address] [C56.9] 05/29/2021    Chronic deep vein thrombosis (DVT) of femoral vein of left lower extremity (HCC) [I82.512] 03/11/2021    Fatigue [R53.83]     Cancer, metastatic to bone [facility]) [C79.51] 01/28/2021    Type 2 diabetes mellitus without complication, without long-term current use of insulin ([address] [E11.9]     Seizure ([address] [R56.9] 10/21/2020       Recurrent ovarian cancer chemotherapy using gemcitabine with stable CA-125  Abdominal pain nausea vomiting  Ileus per KUB  Seizure disorder  DVT of femoral vein on anticoagulation using Eliquis      RECOMMENDATIONS:  1. I personally reviewed results of lab work-up imaging studies and other relevant clinical data. 2. Obtain CT abdomen pelvis with oral and IV contrast to assess disease status  3. Continue symptomatic supportive care  4. Reviewed wound and expectations. Discussed natural history of colon cancer  5. Further recommendation depending upon CT scan results. Discussed with patient and Nurse. Thank you for asking us to see this patient. Sharmaine Newberry MD          This note is created with the assistance of a speech recognition program.  While intending to generate a document that actually reflects the content of the visit, the document can still have some errors including those of syntax and sound a like substitutions which may escape proof reading. It such instances, actual meaning can be extrapolated by contextual diversion.

## 2022-02-22 ENCOUNTER — APPOINTMENT (OUTPATIENT)
Dept: MRI IMAGING | Age: 57
DRG: 388 | End: 2022-02-22
Payer: COMMERCIAL

## 2022-02-22 ENCOUNTER — APPOINTMENT (OUTPATIENT)
Dept: INTERVENTIONAL RADIOLOGY/VASCULAR | Age: 57
DRG: 388 | End: 2022-02-22
Payer: COMMERCIAL

## 2022-02-22 ENCOUNTER — APPOINTMENT (OUTPATIENT)
Dept: GENERAL RADIOLOGY | Age: 57
DRG: 388 | End: 2022-02-22
Payer: COMMERCIAL

## 2022-02-22 ENCOUNTER — APPOINTMENT (OUTPATIENT)
Dept: CT IMAGING | Age: 57
DRG: 388 | End: 2022-02-22
Payer: COMMERCIAL

## 2022-02-22 LAB
ABSOLUTE EOS #: <0.03 K/UL (ref 0–0.44)
ABSOLUTE IMMATURE GRANULOCYTE: 0.07 K/UL (ref 0–0.3)
ABSOLUTE LYMPH #: 0.92 K/UL (ref 1.1–3.7)
ABSOLUTE MONO #: 0.15 K/UL (ref 0.1–1.2)
ANION GAP SERPL CALCULATED.3IONS-SCNC: 14 MMOL/L (ref 9–17)
BASOPHILS # BLD: 1 % (ref 0–2)
BASOPHILS ABSOLUTE: 0.07 K/UL (ref 0–0.2)
BUN BLDV-MCNC: 8 MG/DL (ref 6–20)
CALCIUM SERPL-MCNC: 9.1 MG/DL (ref 8.6–10.4)
CHLORIDE BLD-SCNC: 100 MMOL/L (ref 98–107)
CO2: 23 MMOL/L (ref 20–31)
CREAT SERPL-MCNC: 0.35 MG/DL (ref 0.5–0.9)
CULTURE: NORMAL
EOSINOPHILS RELATIVE PERCENT: 0 % (ref 1–4)
FERRITIN: 202 UG/L (ref 13–150)
FOLATE: 14.4 NG/ML
GFR AFRICAN AMERICAN: >60 ML/MIN
GFR NON-AFRICAN AMERICAN: >60 ML/MIN
GFR SERPL CREATININE-BSD FRML MDRD: ABNORMAL ML/MIN/{1.73_M2}
GLUCOSE BLD-MCNC: 103 MG/DL (ref 70–99)
GLUCOSE BLD-MCNC: 173 MG/DL (ref 65–105)
HCT VFR BLD CALC: 38.7 % (ref 36.3–47.1)
HEMOGLOBIN: 11.2 G/DL (ref 11.9–15.1)
IMMATURE GRANULOCYTES: 1 %
IRON SATURATION: 18 % (ref 20–55)
IRON: 46 UG/DL (ref 37–145)
LV EF: 55 %
LVEF MODALITY: NORMAL
LYMPHOCYTES # BLD: 7 % (ref 24–43)
MAGNESIUM: 1.7 MG/DL (ref 1.6–2.6)
MCH RBC QN AUTO: 28.9 PG (ref 25.2–33.5)
MCHC RBC AUTO-ENTMCNC: 28.9 G/DL (ref 28.4–34.8)
MCV RBC AUTO: 99.7 FL (ref 82.6–102.9)
MONOCYTES # BLD: 1 % (ref 3–12)
NRBC AUTOMATED: 0 PER 100 WBC
PDW BLD-RTO: 17.3 % (ref 11.8–14.4)
PLATELET # BLD: 551 K/UL (ref 138–453)
PMV BLD AUTO: 9.6 FL (ref 8.1–13.5)
POTASSIUM SERPL-SCNC: 3.6 MMOL/L (ref 3.7–5.3)
RBC # BLD: 3.88 M/UL (ref 3.95–5.11)
RBC # BLD: ABNORMAL 10*6/UL
SEG NEUTROPHILS: 91 % (ref 36–65)
SEGMENTED NEUTROPHILS ABSOLUTE COUNT: 13.01 K/UL (ref 1.5–8.1)
SODIUM BLD-SCNC: 137 MMOL/L (ref 135–144)
SPECIMEN DESCRIPTION: NORMAL
TOTAL IRON BINDING CAPACITY: 256 UG/DL (ref 250–450)
UNSATURATED IRON BINDING CAPACITY: 210 UG/DL (ref 112–347)
VITAMIN B-12: 313 PG/ML (ref 232–1245)
WBC # BLD: 14.2 K/UL (ref 3.5–11.3)

## 2022-02-22 PROCEDURE — 2500000003 HC RX 250 WO HCPCS: Performed by: STUDENT IN AN ORGANIZED HEALTH CARE EDUCATION/TRAINING PROGRAM

## 2022-02-22 PROCEDURE — 82607 VITAMIN B-12: CPT

## 2022-02-22 PROCEDURE — 6370000000 HC RX 637 (ALT 250 FOR IP): Performed by: STUDENT IN AN ORGANIZED HEALTH CARE EDUCATION/TRAINING PROGRAM

## 2022-02-22 PROCEDURE — A4216 STERILE WATER/SALINE, 10 ML: HCPCS | Performed by: STUDENT IN AN ORGANIZED HEALTH CARE EDUCATION/TRAINING PROGRAM

## 2022-02-22 PROCEDURE — 95714 VEEG EA 12-26 HR UNMNTR: CPT

## 2022-02-22 PROCEDURE — 1200000000 HC SEMI PRIVATE

## 2022-02-22 PROCEDURE — 6360000002 HC RX W HCPCS: Performed by: STUDENT IN AN ORGANIZED HEALTH CARE EDUCATION/TRAINING PROGRAM

## 2022-02-22 PROCEDURE — 82746 ASSAY OF FOLIC ACID SERUM: CPT

## 2022-02-22 PROCEDURE — 83735 ASSAY OF MAGNESIUM: CPT

## 2022-02-22 PROCEDURE — C9113 INJ PANTOPRAZOLE SODIUM, VIA: HCPCS | Performed by: STUDENT IN AN ORGANIZED HEALTH CARE EDUCATION/TRAINING PROGRAM

## 2022-02-22 PROCEDURE — 85025 COMPLETE CBC W/AUTO DIFF WBC: CPT

## 2022-02-22 PROCEDURE — 2580000003 HC RX 258: Performed by: STUDENT IN AN ORGANIZED HEALTH CARE EDUCATION/TRAINING PROGRAM

## 2022-02-22 PROCEDURE — 99232 SBSQ HOSP IP/OBS MODERATE 35: CPT | Performed by: INTERNAL MEDICINE

## 2022-02-22 PROCEDURE — 6360000004 HC RX CONTRAST MEDICATION: Performed by: INTERNAL MEDICINE

## 2022-02-22 PROCEDURE — 74177 CT ABD & PELVIS W/CONTRAST: CPT

## 2022-02-22 PROCEDURE — 99233 SBSQ HOSP IP/OBS HIGH 50: CPT | Performed by: PSYCHIATRY & NEUROLOGY

## 2022-02-22 PROCEDURE — 83550 IRON BINDING TEST: CPT

## 2022-02-22 PROCEDURE — 93308 TTE F-UP OR LMTD: CPT

## 2022-02-22 PROCEDURE — 95816 EEG AWAKE AND DROWSY: CPT | Performed by: PSYCHIATRY & NEUROLOGY

## 2022-02-22 PROCEDURE — 74018 RADEX ABDOMEN 1 VIEW: CPT

## 2022-02-22 PROCEDURE — 36415 COLL VENOUS BLD VENIPUNCTURE: CPT

## 2022-02-22 PROCEDURE — 82728 ASSAY OF FERRITIN: CPT

## 2022-02-22 PROCEDURE — 95700 EEG CONT REC W/VID EEG TECH: CPT

## 2022-02-22 PROCEDURE — 83540 ASSAY OF IRON: CPT

## 2022-02-22 PROCEDURE — 95819 EEG AWAKE AND ASLEEP: CPT

## 2022-02-22 PROCEDURE — 80048 BASIC METABOLIC PNL TOTAL CA: CPT

## 2022-02-22 PROCEDURE — 82947 ASSAY GLUCOSE BLOOD QUANT: CPT

## 2022-02-22 PROCEDURE — 6360000002 HC RX W HCPCS

## 2022-02-22 RX ORDER — LORAZEPAM 2 MG/ML
1 INJECTION INTRAMUSCULAR ONCE
Status: DISCONTINUED | OUTPATIENT
Start: 2022-02-22 | End: 2022-02-22

## 2022-02-22 RX ORDER — MAGNESIUM SULFATE IN WATER 40 MG/ML
2000 INJECTION, SOLUTION INTRAVENOUS ONCE
Status: COMPLETED | OUTPATIENT
Start: 2022-02-22 | End: 2022-02-22

## 2022-02-22 RX ORDER — LAMOTRIGINE 100 MG/1
100 TABLET ORAL 2 TIMES DAILY
Status: DISCONTINUED | OUTPATIENT
Start: 2022-02-22 | End: 2022-02-25

## 2022-02-22 RX ORDER — POTASSIUM CHLORIDE 7.45 MG/ML
40 INJECTION INTRAVENOUS EVERY 6 HOURS
Status: DISCONTINUED | OUTPATIENT
Start: 2022-02-22 | End: 2022-02-22

## 2022-02-22 RX ORDER — LAMOTRIGINE 25 MG/1
25 TABLET ORAL ONCE
Status: COMPLETED | OUTPATIENT
Start: 2022-02-22 | End: 2022-02-22

## 2022-02-22 RX ORDER — LEVETIRACETAM 10 MG/ML
1000 INJECTION INTRAVASCULAR ONCE
Status: COMPLETED | OUTPATIENT
Start: 2022-02-22 | End: 2022-02-22

## 2022-02-22 RX ORDER — POTASSIUM CHLORIDE 7.45 MG/ML
10 INJECTION INTRAVENOUS
Status: ACTIVE | OUTPATIENT
Start: 2022-02-22 | End: 2022-02-22

## 2022-02-22 RX ORDER — LEVETIRACETAM 5 MG/ML
500 INJECTION INTRAVASCULAR ONCE
Status: COMPLETED | OUTPATIENT
Start: 2022-02-22 | End: 2022-02-22

## 2022-02-22 RX ORDER — LORAZEPAM 2 MG/ML
1 INJECTION INTRAMUSCULAR
Status: ACTIVE | OUTPATIENT
Start: 2022-02-22 | End: 2022-02-22

## 2022-02-22 RX ORDER — LORAZEPAM 2 MG/ML
INJECTION INTRAMUSCULAR
Status: COMPLETED
Start: 2022-02-22 | End: 2022-02-22

## 2022-02-22 RX ADMIN — LORAZEPAM 0.5 MG: 2 INJECTION INTRAMUSCULAR at 03:52

## 2022-02-22 RX ADMIN — LEVETIRACETAM 1500 MG: 15 INJECTION INTRAVENOUS at 12:50

## 2022-02-22 RX ADMIN — LEVETIRACETAM 1000 MG: 10 INJECTION INTRAVENOUS at 04:03

## 2022-02-22 RX ADMIN — AMPICILLIN AND SULBACTAM 3000 MG: 1; 2 INJECTION, POWDER, FOR SOLUTION INTRAMUSCULAR; INTRAVENOUS at 18:03

## 2022-02-22 RX ADMIN — POTASSIUM CHLORIDE 10 MEQ: 7.46 INJECTION, SOLUTION INTRAVENOUS at 14:31

## 2022-02-22 RX ADMIN — LAMOTRIGINE 75 MG: 25 TABLET ORAL at 16:25

## 2022-02-22 RX ADMIN — LAMOTRIGINE 25 MG: 25 TABLET ORAL at 21:30

## 2022-02-22 RX ADMIN — MAGNESIUM SULFATE 2000 MG: 2 INJECTION INTRAVENOUS at 13:10

## 2022-02-22 RX ADMIN — Medication 2 SPRAY: at 09:20

## 2022-02-22 RX ADMIN — SODIUM CHLORIDE: 9 INJECTION, SOLUTION INTRAVENOUS at 06:09

## 2022-02-22 RX ADMIN — SODIUM CHLORIDE, PRESERVATIVE FREE 10 ML: 5 INJECTION INTRAVENOUS at 09:18

## 2022-02-22 RX ADMIN — SODIUM CHLORIDE, PRESERVATIVE FREE 10 ML: 5 INJECTION INTRAVENOUS at 09:34

## 2022-02-22 RX ADMIN — LEVETIRACETAM 1500 MG: 15 INJECTION INTRAVENOUS at 22:59

## 2022-02-22 RX ADMIN — IOPAMIDOL 75 ML: 755 INJECTION, SOLUTION INTRAVENOUS at 22:10

## 2022-02-22 RX ADMIN — POTASSIUM CHLORIDE 10 MEQ: 7.46 INJECTION, SOLUTION INTRAVENOUS at 15:41

## 2022-02-22 RX ADMIN — LEVETIRACETAM 500 MG: 5 INJECTION INTRAVENOUS at 19:23

## 2022-02-22 RX ADMIN — Medication 10 MG: at 17:03

## 2022-02-22 RX ADMIN — AMPICILLIN AND SULBACTAM 3000 MG: 1; 2 INJECTION, POWDER, FOR SOLUTION INTRAMUSCULAR; INTRAVENOUS at 05:23

## 2022-02-22 RX ADMIN — ENOXAPARIN SODIUM 40 MG: 100 INJECTION SUBCUTANEOUS at 22:31

## 2022-02-22 RX ADMIN — LAMOTRIGINE 100 MG: 100 TABLET ORAL at 21:29

## 2022-02-22 RX ADMIN — ENOXAPARIN SODIUM 40 MG: 100 INJECTION SUBCUTANEOUS at 13:50

## 2022-02-22 RX ADMIN — AMPICILLIN AND SULBACTAM 3000 MG: 1; 2 INJECTION, POWDER, FOR SOLUTION INTRAMUSCULAR; INTRAVENOUS at 13:22

## 2022-02-22 RX ADMIN — POTASSIUM CHLORIDE 10 MEQ: 7.46 INJECTION, SOLUTION INTRAVENOUS at 16:45

## 2022-02-22 RX ADMIN — ACETAMINOPHEN 650 MG: 325 TABLET ORAL at 16:25

## 2022-02-22 RX ADMIN — IOHEXOL 50 ML: 240 INJECTION, SOLUTION INTRATHECAL; INTRAVASCULAR; INTRAVENOUS; ORAL at 20:19

## 2022-02-22 RX ADMIN — POTASSIUM CHLORIDE 10 MEQ: 7.46 INJECTION, SOLUTION INTRAVENOUS at 09:22

## 2022-02-22 RX ADMIN — Medication 2 SPRAY: at 21:29

## 2022-02-22 RX ADMIN — PANTOPRAZOLE SODIUM 40 MG: 40 INJECTION, POWDER, FOR SOLUTION INTRAVENOUS at 09:18

## 2022-02-22 ASSESSMENT — PAIN SCALES - GENERAL
PAINLEVEL_OUTOF10: 0
PAINLEVEL_OUTOF10: 0

## 2022-02-22 NOTE — PROGRESS NOTES
Physician Progress Note      Patricia Arthur  CSN #:                  [id number]  :                       1965  ADMIT DATE:       2022 9:53 AM  DISCH DATE:  RESPONDING  PROVIDER #:        Rose Cerda MD          QUERY TEXT:    Pt admitted with dehydration and has encephalopathy documented. If possible,   please document in progress notes and discharge summary further specificity   regarding the type of encephalopathy:    The medical record reflects the following:  Risk Factors: dehydration, sz disorder, on chemo, UTI documented in H&P  Clinical Indicators: per H&P \"Leukocytosis: Likely secondary to UTI given that   she has dysuria\" and \" Lactic acidosis with probable sepsis\", per  prog   note \"Encephalopathy-multifactorial, dehydration, poor oral intake,? Postictal given subtherapeutic levels\" and \"Unasyn given leukocytosis and   tachycardia for possible aspiration pneumonia\", WBC 20, lactic acid 2.8,   procalcitonin 0.25, UA neg urine cx shows E Coli 10 to 50,000 CFU  Treatment: IV fluids, Unasyn, Zosyn, Vanco, Keppra, Lamictal, supplemental O2,   CT chest, labs, cultures    Thank you, Merrill Alpers, FIDENCIO  email - [email or web address]  cell- [phone]  office hours M-F-7A-3P  Options provided:  -- Metabolic encephalopathy  -- Septic encephalopathy  -- Toxic encephalopathy  -- Toxic metabolic encephalopathy  -- Other - I will add my own diagnosis  -- Disagree - Not applicable / Not valid  -- Disagree - Clinically unable to determine / Unknown  -- Refer to Clinical Documentation Reviewer    PROVIDER RESPONSE TEXT:    This patient has toxic metabolic encephalopathy.     Query created by: Mauro Sicard on 2022 9:58 AM      Electronically signed by:  Rose Cerda MD 2022 7:39 AM

## 2022-02-22 NOTE — PROGRESS NOTES
[facility])  Occupational Therapy Not Seen Note    DATE: 2022    NAME: Osmin Iverson  MRN: [id number]   : 1965      Patient not seen this date for Occupational Therapy due to:    Patient is not appropriate for active participation in OT evaluation/treatment at this time d/t inconsistently following commands     Next Scheduled Treatment: check back 2022    Electronically signed by SHRADDHA Kolb on 2022 at 1:10 PM

## 2022-02-22 NOTE — FLOWSHEET NOTE
[facility] [address]  PROGRESS NOTE    Shift date:2/20/2022  Shift day: Monday   Shift # 3    Room # 9808/0261-75   Name: Victoriano Older            Age: 60 y.o. Gender: female          Roman Catholic:    Place of Buddhist:     Referral: Rapid Response    Admit Date & Time: 2/20/2022  9:53 AM    PATIENT/EVENT DESCRIPTION:  Victoriano Older is a 60 y.o. female in room 438.  responded to Rapid response. SPIRITUAL ASSESSMENT/INTERVENTION:  Sumanth Da Silva was ministry of presence.  prepared to contact patient's family per instructions from medical staff.  asked to contact patient's family. .  Calls made to patient's son Will [facility] [phone] no response, calls made to patient's daughters Andrew Payne [phone] and daughter Casey Alert [phone] no responses. Patient's nurse stated he would follow up and leave voice messages. SPIRITUAL CARE FOLLOW-UP PLAN:  Chaplains will remain available to offer spiritual and emotional support as needed. Electronically signed by Daphnie Bo      02/22/22 8362   Encounter Summary   Services provided to: Patient; Family   Referral/Consult From: Multi-disciplinary team   Support System Children   Continue Visiting   (2/22/2022)   Complexity of Encounter High   Length of Encounter 45 minutes   Spiritual Assessment Completed Yes   Crisis   Type Rapid response   Assessment Approachable   Intervention Sustaining presence/ Ministry of presence   Outcome Did not respond   , on 2/22/2022 at [address] [place] [address]  [phone]

## 2022-02-22 NOTE — PROCEDURES
EEG REPORT       Patient: Tania Tobin Age: 60 y.o. MRN: [id number]    Date: 2/20/2022  Referring Provider: No ref. provider found    History: This routine 30 minute scalp EEG was recorded with video- monitoring for a 60 y.o. Waldemar Nguyễn female who presented with encephalopathy. This EEG was performed to evaluate for focal and epileptiform abnormalities.      Jennifer Garcia   Current Facility-Administered Medications   Medication Dose Route Frequency Provider Last Rate Last Admin    iohexol (OMNIPAQUE 240) injection 50 mL  50 mL Oral ONCE PRN Saul Kenney MD        potassium chloride 10 mEq/100 mL IVPB (Peripheral Line)  10 mEq IntraVENous Q1H Linda Hardin  mL/hr at 02/22/22 1541 10 mEq at 02/22/22 1541    LORazepam (ATIVAN) injection 1 mg  1 mg IntraVENous Once PRN Rodrigue Marquez MD        bisacodyl (DULCOLAX) suppository 10 mg  10 mg Rectal Daily PRN Cliff Terry MD        enoxaparin (LOVENOX) injection 40 mg  40 mg SubCUTAneous BID Cliff Terry MD   40 mg at 02/22/22 1350    oxymetazoline (AFRIN) 0.05 % nasal spray 2 spray  2 spray Each Nostril BID Cliff Terry MD   2 spray at 02/22/22 0920    albuterol (PROVENTIL) nebulizer solution 2.5 mg  2.5 mg Nebulization As Directed RT PRN Cliff Terry MD        ampicillin-sulbactam (UNASYN) 3000 mg ivpb minibag  3,000 mg IntraVENous Q6H Cliff Terry MD   Stopped at 02/22/22 1354    sodium chloride flush 0.9 % injection 5-40 mL  5-40 mL IntraVENous 2 times per day Raghu Fails, DO   10 mL at 02/20/22 2023    sodium chloride flush 0.9 % injection 5-40 mL  5-40 mL IntraVENous PRN Raghu Fails, DO        0.9 % sodium chloride infusion  25 mL IntraVENous PRN Raghu Fails, DO        sodium chloride flush 0.9 % injection 5-40 mL  5-40 mL IntraVENous 2 times per day Cliff Terry MD   10 mL at 02/20/22 2306    sodium chloride flush 0.9 % injection 5-40 mL  5-40 mL IntraVENous PRN Cliff Terry MD   10 mL at 02/22/22 0918    0.9 % sodium chloride infusion 25 mL IntraVENous PRN Dylan Vargas MD        acetaminophen (TYLENOL) tablet 650 mg  650 mg Oral Q6H PRN Dylan Vargas MD   650 mg at 02/22/22 1625    Or    acetaminophen (TYLENOL) suppository 650 mg  650 mg Rectal Q6H PRN Dylan Vargas MD        0.9 % sodium chloride infusion   IntraVENous Continuous Dylan Vargas MD 75 mL/hr at 02/22/22 0609 New Bag at 02/22/22 0609    potassium chloride (KLOR-CON M) extended release tablet 40 mEq  40 mEq Oral PRN Dylan Vargas MD        Or    potassium bicarb-citric acid (EFFER-K) effervescent tablet 40 mEq  40 mEq Oral PRN Dylan Vargas MD        Or    potassium chloride 10 mEq/100 mL IVPB (Peripheral Line)  10 mEq IntraVENous PRN Dylan Vargas MD        lamoTRIgine (LAMICTAL) tablet 75 mg  75 mg Oral BID Dylan Vargas MD   75 mg at 02/22/22 1625    levETIRAcetam (KEPPRA) 1500 mg/100 mL IVPB  1,500 mg IntraVENous Q12H Dylan Vargas MD   Stopped at 02/22/22 1312    morphine (PF) injection 1 mg  1 mg IntraVENous Q4H PRN Dylan Vargas MD   1 mg at 02/21/22 2225    pantoprazole (PROTONIX) injection 40 mg  40 mg IntraVENous Daily Dylan Vargas MD   40 mg at 02/22/22 7246    And    sodium chloride (PF) 0.9 % injection 10 mL  10 mL IntraVENous Daily Dylan Vargas MD   10 mL at 02/22/22 0934    promethazine (PHENERGAN) tablet 12.5 mg  12.5 mg Oral Q6H PRN Dylan Vargas MD   12.5 mg at 02/20/22 2305    Or    ondansetron (ZOFRAN) injection 4 mg  4 mg IntraVENous Q6H PRN Dylan Vargas MD   4 mg at 02/21/22 2225    labetalol (NORMODYNE;TRANDATE) injection 10 mg  10 mg IntraVENous Q6H PRN Dylan Vargas MD           Technical Description: This is a 21 channel digital EEG recording with time-locked video. Electrodes were placed in accordance with the 10-20 International System of Electrode Placement. Single lead EKG monitoring as well as temporal electrodes were included. The patient was not sleep deprived. This recording was obtained during wakefulness. EEG Description:  The background activity over the right hemisphere consisted of 5 to 6 Hz of polymorphic theta activity of 50 to 60 µV. Activity over the left hemisphere consisted of slightly higher amplitude of 80-90 µV of 3 to 4 Hz of polymorphic delta slowing. At times, activity over the left hemisphere appeared causing rhythmic with higher amplitudes 2 to 3 Hz of delta activity seen over the left frontotemporal leads, waxing and waning without clear evolution. Sleep architecture was not seen. Photic stimulation was not performed. Hyperventilation was not performed. The EKG channel demonstrated a normal sinus rhythm. Interpretation  This EEG was abnormal due to disorganized and slow background in polymorphic theta frequency. The activity over the left hemisphere had slightly higher amplitude and had runs of semirhythmic left frontotemporal delta activity. Clinical correlation  This EEG was abnormal. Disorganized and slow background suggested mild to moderate encephalopathy of non specific etiology. Runs of semirhythmic of delta activity over the left frontotemporal leads were not clearly epileptic but a prolonged EEG may be recommended if clinically indicated.     Maddy Swan MD  Diplomate, American Board of Psychiatry and Neurology  Diplomate, American Board of Clinical Neurophysiology  Diplomate, American Board of Epilepsy

## 2022-02-22 NOTE — PROGRESS NOTES
[place]  Internal Medicine Teaching Residency Program  Inpatient Daily Progress Note  ______________________________________________________________________________    Patient: Ok Harden [facility]  YOB: 1965   QRS:[id number]    Acct: [de-identified]     Room: [address] [place] [address]  Admit date: 2/20/2022  Today's date: 02/22/22  Number of days in the hospital: 2    SUBJECTIVE   Admitting Diagnosis: Dehydration  CC: Altered mental status, Nausea and vomiting. Pt seen and examined  Had seizure yesterday- MRI brain with and without contrast today per neuro      ROS:  Unable to obtain due to patient's mentation. BRIEF HISTORY     Patient came in with abdominal pain, nausea and vomiting which started yesterday and is getting. 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 Patient currently has extensive intra-abdominal and splenic and lung involvement from ovarian cancer. Patient with recent intra-abdominal bleeding due to splenic mass with GI infiltration s/p embolization. Patient also has seizure disorder. She is currently on palliative treatment with Doxil. Patient recently had a DVT last week for which she was started on Eliquis. Patient's last chemotherapy was on 2/18/2022 when she received Gemzar for chemotherapy  Nausea, vomiting, diarrhea, mouth sores, drowsiness, muscle aches are some of the known side effects of Gemzar. See H&P of admitting/intern resident for more details.      PMH/Home meds:  · Metastatic ovarian cancer with mets to liverpalliative chemotherapy  · Seizure disorder-Lamictal and Keppra  · Type 2 diabetes  · DVT on Eliquis  · ?   Depression  · Anemia on iron supplements  · Protonix  · Percocet for pain     ER Course  On arrival patient afebrile, hypertensive with blood pressure between 563I to 895N systolic, on 2 L of oxygen via nasal cannula. Patient does not use any home oxygen. Patient's lab at time of admission showed sodium 142, potassium 3.4, bicarb 22, creatinine 0.47, glucose 150, lactic acid 2.8 which improved to 1.5  Patient's proBNP 328, troponins 51-57-17  LFTs normal  Blood glucose 115  Prolactin level 4.52  Keppra levels within normal limits  Lamictal levels 1.3  Patient had leukocytosis 20, hemoglobin 11.9 stable, platelets 001. Blood cultures and urine cultures were sent  UA unremarkable with 2+ glucosuria but no leukocyte esterase or bacteriuria or WBCs. CT chest for PE was negative, no concerns for pneumonia, showed pericardial effusion which was present on previous CTs as well  CT head was negative  X-ray chest was unremarkable  Abdominal x-ray showed ileus  EKG was unremarkable with 467 QTC  Patient received 1 L fluid bolus in the ER and started on fluids. Patient was transferred to medicine service for further management. OBJECTIVE     Vital Signs:  BP (!) 136/59   Pulse 84   Temp 98.2 °F (36.8 °C) (Oral)   Resp 16   Wt 168 lb (76.2 kg)   SpO2 95%   BMI 27.96 kg/m²     Temp (24hrs), Av.7 °F (37.1 °C), Min:98.2 °F (36.8 °C), Max:99.2 °F (37.3 °C)    In: -   Out: 200     Physical Exam:  General:  Awake but confused  HEENT: Atraumatic, normocephalic, no throat congestion, moist mucosa. Eyes:   PERRLA  Neck:   Supple  Chest:   Clear on auscultation. no rales or ronchi  Cardiac:  S1 S2 RR, no gallops, murmur or rubs. Abdomen: Soft, non-tender, no masses or organomegaly, BS present. :   No suprapubic or flank tenderness. Neuro:  Unable to assess, no focal neurological deficit  SKIN:  No rashes, good skin turgor. Extremities:  No edema.     Medications:  Scheduled Medications:    potassium chloride  40 mEq IntraVENous Q6H    magnesium sulfate  2,000 mg IntraVENous Once    enoxaparin  40 mg SubCUTAneous BID    oxymetazoline  2 spray Each Nostril BID    ampicillin-sulbactam  3,000 mg IntraVENous Q6H    sodium chloride flush  5-40 mL IntraVENous 2 times per day    sodium chloride flush  5-40 mL IntraVENous 2 times per day    lamoTRIgine  75 mg Oral BID    levetiracetam  1,500 mg IntraVENous Q12H    pantoprazole  40 mg IntraVENous Daily    And    sodium chloride (PF)  10 mL IntraVENous Daily     Continuous Infusions:    sodium chloride      sodium chloride      sodium chloride 75 mL/hr at 02/22/22 0609     PRN Medicationsbisacodyl, 10 mg, Daily PRN  albuterol, 2.5 mg, As Directed RT PRN  sodium chloride flush, 5-40 mL, PRN  sodium chloride, 25 mL, PRN  sodium chloride flush, 5-40 mL, PRN  sodium chloride, 25 mL, PRN  acetaminophen, 650 mg, Q6H PRN   Or  acetaminophen, 650 mg, Q6H PRN  potassium chloride, 40 mEq, PRN   Or  potassium alternative oral replacement, 40 mEq, PRN   Or  potassium chloride, 10 mEq, PRN  morphine, 1 mg, Q4H PRN  promethazine, 12.5 mg, Q6H PRN   Or  ondansetron, 4 mg, Q6H PRN  labetalol, 10 mg, Q6H PRN        Diagnostic Labs:  CBC:   Recent Labs     02/20/22  1026 02/21/22  0610 02/22/22  0637   WBC 20.0* 14.1* 14.2*   RBC 4.08 3.56* 3.88*   HGB 11.9 10.5* 11.2*   HCT 39.5 33.7* 38.7   MCV 96.8 94.7 99.7   RDW 17.7* 17.4* 17.3*   * 609* 551*     BMP:   Recent Labs     02/20/22  1026 02/21/22  0610 02/22/22  0637    137 137   K 3.4* 3.6* 3.6*    103 100   CO2 22 22 23   BUN 13 8 8   CREATININE 0.47* 0.39* 0.35*     BNP: No results for input(s): BNP in the last 72 hours. PT/INR: No results for input(s): PROTIME, INR in the last 72 hours. APTT: No results for input(s): APTT in the last 72 hours. CARDIAC ENZYMES: No results for input(s): CKMB, CKMBINDEX, TROPONINI in the last 72 hours.     Invalid input(s): CKTOTAL;3  FASTING LIPID PANEL:  Lab Results   Component Value Date    CHOL 131 03/10/2021    HDL 33 (L) 03/10/2021    TRIG 147 03/10/2021     LIVER PROFILE:   Recent Labs     02/20/22  1026   AST 12   ALT 7   BILITOT 0.16*   ALKPHOS 65 MICROBIOLOGY:   Lab Results   Component Value Date/Time    CULTURE NO GROWTH 1 DAY 02/20/2022 11:20 AM       Imaging:    XR ABDOMEN (KUB) (SINGLE AP VIEW)    Result Date: 2/20/2022  Ileus. Probable gallstone. Catheter left abdomen and pelvis requires clinical correlation. CT Head WO Contrast    Result Date: 2/20/2022  No acute intracranial abnormality. XR CHEST PORTABLE    Result Date: 2/20/2022  Chronic findings in the chest without acute airspace disease identified. CT CHEST PULMONARY EMBOLISM W CONTRAST    Result Date: 2/20/2022  1. No evidence for acute pulmonary embolism. 2.  Bilateral pulmonary nodules, thoracic lymphadenopathy, soft tissue calcifications in the chest and upper abdomen and sclerotic bone lesions again demonstrated, as described previously, which may be related to the patient's history of malignancy. 3.  Small pericardial effusion. Trace left pleural effusion. 4.  Mild septal thickening suggestive of interstitial edema. ASSESSMENT & PLAN     ASSESSMENT / PLAN:     Principal Problem:    Dehydration  Active Problems:    Seizure ([address]    Type 2 diabetes mellitus without complication, without long-term current use of insulin (HCC)    Cancer, metastatic to bone (HCC)    Fatigue    Chronic deep vein thrombosis (DVT) of femoral vein of left lower extremity (HCC)    Ovarian cancer (HCC)    AMS (altered mental status)    Lactic acidosis    Ileus (HCC)    Elevated troponin    Pericardial effusion    Hypokalemia    Acute respiratory failure with hypoxia (HCC)  Resolved Problems:    * No resolved hospital problems. *    IMPRESSION  This is a 60 y.o. female with a PMH of ovarian cancer with metastasis post chemotherapy/radiotherapy couple of days ago  presented with N/V, chills, fatigue, weakess and altered mental status. Patient also has elevated troponins for which cardiology is on board and new leukocytosis for which hematology is following.   Patient admitted to inpatient status for further managament. 1.  Ileus:   Keep NPO with IVF dehydration along with NG tube. NG to suction with 200 ml out. Potassium replaced. CT abdomen pending    2. Dehydration:  · Likely secondary to emesis. Continue 0.9% NACL infusion at 75 ml/hr. 3. Seizures:   · Resume her home Lamictal and Keppra. EEG ordered. Neuro following. MRI brain pending. Change in mentation likely due to being postictal.  We will continue to monitor. 4. Type 2 diabetes mellitus without complication, without long-term current use of insulin (HCC)  · POC glucose checks    5. Leukocytosis:   · Seizure related likely. Will continue to monitor    6 . Elevated blood pressure  · Likely due to pain and emesis    7. Hypokalemia:   · Likely cause of ileus    8. Acute respiratory failure with hypoxia with unclear etiology:   · Unclear etiology. Will continue to monitor    9. Lactic acidosis- resolved. 10.  Chronic DVT of femoral vein of lower extremity:   Full dose lovenox as patient is NPO  Will resume eliquis once tolerating oral feeds     DVT ppx: already on full dose lovenox  GI ppx: Protonix     PT/OT/SW: Consulted  Discharge Planning: Case management consulted. [facility]  Internal Medicine Resident  [facility]  2/22/2022 7:51 AM   Attending Physician Statement  I have discussed the care of Joe Cat, including pertinent history and exam findings,  with the resident. I have seen and examined the patient and the key elements of all parts of the encounter have been performed by me. I agree with the assessment, plan and orders as documented by the resident with additions . Treatment plan Discussed with nursing staff in detail , all questions answered . Electronically signed by Nusrat Thompson MD on   2/22/22 at 4:08 PM EST    Please note that this chart was generated using voice recognition Dragon dictation software.   Although every effort was made to ensure the accuracy of this automated transcription, some errors in transcription may have occurred.

## 2022-02-22 NOTE — PLAN OF CARE
Rapid response at 3: 39 AM.    Writer went bedside to evaluate the patient, [facility] at bedside. RN taking care of the patient will go to her back patient not on room became stiff and started seizing. As per chart patient has history of seizure disorder on Lamictal 75 mg twice daily and Keppra 1500 mg IV every 12. RN reported that patient had poor oral intake. Patient fluoroscopy-guided NG tube placement yesterday by IR after multiple failed attempts by nursing staff. RN reported that the NG came out during the day shift and patient did not receive her p.o. medications including her antiseizure medications(Lamictal). Neurology is following the patient, neurology or neuro crit resident came bedside to evaluate the patient. Since patient was seizing it was difficult to get blood pressure readings, RN reported that her heart rate is in the 130s. Patient received 2 g of Ativan and 1 g Keppra. POC glucose 173. After receiving Keppra patient was stable, repeat blood pressure was 130/ 69 with heart rate in 70s. Patient has history of recurrent ovarian cancer currently on prophylactic therapy for above now, on Eliquis for recently diagnosed DVT which was held as patient was n.p.o. yesterday. Lovenox 40 mg Twice daily.  tried to contact patient's son and 2 daughters, one of them answered the call. Left voicemail. Patient currently stable, not seizing any more, doesn't need ICU care. Plan discussed with neurology resident and RN. We will continue to follow.     Yulia Nuñez MD  Internal Medicine Resident, PGY-2  [facility]         2/22/2022, 4:37 AM

## 2022-02-22 NOTE — PROGRESS NOTES
At approximately 345am, patient screamed out and then started to tense up and convulse and had a seizure. Rapid response called. Patient positioned on their side. O2 and Suction applied to patient. 2mg IV ativan given and the seizure subsided. Vitals rechecked and patient is now stable. IV Keppra ordered and given. Patient is now resting comfortably. Patient's son notified and updated on situation, all questions answered. Will continue to monitor.

## 2022-02-22 NOTE — PROGRESS NOTES
Physical Therapy        Physical Therapy Cancel Note      DATE: 2022    NAME: Tejinder Monae  MRN: [id number]   : 1965      Patient not seen this date for Physical Therapy due to: Other: Pt now following minimal to no commands.  ck       Electronically signed by Tanisha Alexis PT on  at 12:10 PM

## 2022-02-22 NOTE — PROGRESS NOTES
Today's Date: 2/22/2022  Patient Name: Shahida Armas  Date of admission: 2/20/2022  9:53 AM  Patient's age: 60 y.o., 1965  Admission Dx: Dehydration [E86.0]  Confusion [R41.0]  Hypoxia [R09.02]  Fatigue, unspecified type [R53.83]  AMS (altered mental status) [R41.82]    Reason for Consult: management recommendations  Requesting Physician: Panda Hecrules MD    CHIEF COMPLAINT: Nausea vomiting abdominal pain. History Obtained From:  patient, electronic medical record    Interval history:    Patient seen and examined  Labs and vitals reviewed  Patient mentation deteriorated she is not able to give any meaningful history  Neurology team consulted  Awaiting CT abdomen pelvis          HISTORY OF PRESENT ILLNESS:      The patient is a 60 y.o.  female who is admitted to the hospital for chief complains abdominal pain nausea vomiting which started yesterday. Patient's oncologic history is as below. Patient also recently had a DVT for which she has been started on Eliquis. Patient had a KUB done which shows findings of ileus. Patient last CT abdomen pelvis from 10/29. Patient is also on antiseizure medication. Patient is admitted with above complaints. Underwent placement of NG tube per IR. Patient is well-known to our practice. She has history of recurrent ovarian cancer. Patient is currently on cytotoxic therapy using Gemzar and has been tolerating it well. Patient last Ca1 25 was relatively stable. Patient CT chest from 2/20/2022 shows bilateral lung nodules thoracic adenopathy and sclerotic bony lesions      Past Medical History:   has a past medical history of Anemia, Bleeding, Cervical cancer ([address] Depression, Diabetes mellitus ([address] GERD (gastroesophageal reflux disease), Hx of blood clots, Hypertension, Metastatic cancer ([address] Ovarian cancer ([address] Post chemo evaluation, and Splenic lesion.     Past Surgical History:   has a past surgical history that includes Hysterectomy, total abdominal; Port Surgery; Tonsillectomy; IR PORT PLACEMENT > 5 YEARS (08/24/2020); Anus surgery; Abscess Drainage (2013); colectomy (03/2013); IR EMBOLIZATION HEMORRHAGE (10/05/2020); and Cardiac catheterization. Medications:    Prior to Admission medications    Medication Sig Start Date End Date Taking? Authorizing Provider   LORazepam (ATIVAN) 1 MG tablet Take 1 tablet by mouth every 8 hours as needed for Anxiety for up to 30 doses. 2/18/22 4/2/22  Kayla Allan MD   polyethylene glycol (MIRALAX) 17 g packet Take 17 g by mouth daily as needed for Constipation 2/18/22 3/20/22  Kayla Allan MD   lamoTRIgine (LAMICTAL) 25 MG tablet TAKE 3 TABS IN IN THE MORNING AND 3 TABS IN IN THE EVENING 2/9/22   Samer Sara Chino MD   levETIRAcetam (KEPPRA) 750 MG tablet Take 2 tablets by mouth 2 times daily 2/9/22   Sebas Howard MD   diazePAM 5 MG/0.1ML LIQD 1 puff by Nasal route as needed (seizures)  Patient not taking: Reported on 2/18/2022 2/9/22   Sebas Howard MD   morphine (MS CONTIN) 30 MG extended release tablet TAKE 1 TABLET BY MOUTH 2 TIMES DAILY FOR 30 DAYS. 1/27/22 2/26/22  Dmitriy Bhatia MD   morphine (MS CONTIN) 15 MG extended release tablet TAKE 1 TABLET BY MOUTH 2 TIMES DAILY FOR 30 DAYS.  1/27/22 2/26/22  Kayla Allan MD   oxyCODONE-acetaminophen (PERCOCET) 5-325 MG per tablet TAKE 1 TABLET BY MOUTH EVERY 4 HOURS AS NEEDED FOR PAIN (BREAKTHROUGH PAIN) - REDUCE DOSES TAKEN AS PAIN BECOMES MANAGEABLE 1/27/22 2/26/22  Kayla Allan MD   pantoprazole (PROTONIX) 40 MG tablet Take 1 tablet by mouth daily 1/3/22   Kayla Allan MD   ondansetron (ZOFRAN-ODT) 4 MG disintegrating tablet Take 1 tablet by mouth every 8 hours as needed for Nausea or Vomiting 12/3/21   Kayla Allan MD   naloxegol (MOVANTIK) 12.5 MG TABS tablet Take 1 tablet by mouth every morning  Patient not taking: Reported on 2/18/2022 11/15/21   Kayla lAlan MD   ferrous sulfate (IRON 325) 325 (65 Fe) MG tablet Take 1 tablet by mouth daily (with breakfast) 10/31/21   Clotilde Gil MD   morphine (MSIR) 30 MG tablet Take 30 mg by mouth 2 times daily as needed for Pain.     Historical Provider, MD   sertraline (ZOLOFT) 100 MG tablet TAKE 1 TABLET BY MOUTH DAILY 10/4/21 2/18/22  Meenu Ruggiero MD   ELIQUIS 5 MG TABS tablet Take 5 mg by mouth 2 times daily  5/26/21   Historical Provider, MD     Current Facility-Administered Medications   Medication Dose Route Frequency Provider Last Rate Last Admin    magnesium sulfate 2000 mg in 50 mL IVPB premix  2,000 mg IntraVENous Once Simona Phipps MD 25 mL/hr at 02/22/22 1310 2,000 mg at 02/22/22 1310    iohexol (OMNIPAQUE 240) injection 50 mL  50 mL Oral ONCE PRN Meenu Ruggiero MD        potassium chloride 10 mEq/100 mL IVPB (Peripheral Line)  10 mEq IntraVENous Q1H Jose Clemons MD        LORazepam (ATIVAN) injection 1 mg  1 mg IntraVENous Once PRN Amie Todd MD        bisacodyl (DULCOLAX) suppository 10 mg  10 mg Rectal Daily PRN Simona Phipps MD        enoxaparin (LOVENOX) injection 40 mg  40 mg SubCUTAneous BID Simona Phipps MD   40 mg at 02/22/22 1350    oxymetazoline (AFRIN) 0.05 % nasal spray 2 spray  2 spray Each Nostril BID Simona Phipps MD   2 spray at 02/22/22 0920    albuterol (PROVENTIL) nebulizer solution 2.5 mg  2.5 mg Nebulization As Directed RT PRN Simona Phipps MD        ampicillin-sulbactam (UNASYN) 3000 mg ivpb minibag  3,000 mg IntraVENous Q6H Simona Phipps MD   Stopped at 02/22/22 1354    sodium chloride flush 0.9 % injection 5-40 mL  5-40 mL IntraVENous 2 times per day Harjinder Ok, DO   10 mL at 02/20/22 2023    sodium chloride flush 0.9 % injection 5-40 mL  5-40 mL IntraVENous PRN Harjinder Ok, DO        0.9 % sodium chloride infusion  25 mL IntraVENous PRN Harjidner Ok, DO        sodium chloride flush 0.9 % injection 5-40 mL  5-40 mL IntraVENous 2 times per day Simona Phipps MD   10 mL at 02/20/22 3467    sodium chloride flush 0.9 % injection 5-40 mL  5-40 mL IntraVENous PRN Vivi Moyer MD   10 mL at 02/22/22 0918    0.9 % sodium chloride infusion  25 mL IntraVENous PRN Vivi Moyer MD        acetaminophen (TYLENOL) tablet 650 mg  650 mg Oral Q6H PRN Vivi Moyer MD        Or    acetaminophen (TYLENOL) suppository 650 mg  650 mg Rectal Q6H PRN Vivi Moyer MD        0.9 % sodium chloride infusion   IntraVENous Continuous Vivi Moyer MD 75 mL/hr at 02/22/22 0609 New Bag at 02/22/22 0609    potassium chloride (KLOR-CON M) extended release tablet 40 mEq  40 mEq Oral PRN Vivi Moyer MD        Or    potassium bicarb-citric acid (EFFER-K) effervescent tablet 40 mEq  40 mEq Oral PRN Vivi Moyer MD        Or    potassium chloride 10 mEq/100 mL IVPB (Peripheral Line)  10 mEq IntraVENous PRN Vivi Moyer MD        lamoTRIgine (LAMICTAL) tablet 75 mg  75 mg Oral BID Vivi Moyer MD        levETIRAcetam (KEPPRA) 1500 mg/100 mL IVPB  1,500 mg IntraVENous Q12H Vivi Moyer MD   Stopped at 02/22/22 1312    morphine (PF) injection 1 mg  1 mg IntraVENous Q4H PRN Vivi Moyer MD   1 mg at 02/21/22 2225    pantoprazole (PROTONIX) injection 40 mg  40 mg IntraVENous Daily Vivi Moyer MD   40 mg at 02/22/22 0097    And    sodium chloride (PF) 0.9 % injection 10 mL  10 mL IntraVENous Daily Vivi Moyer MD   10 mL at 02/22/22 0934    promethazine (PHENERGAN) tablet 12.5 mg  12.5 mg Oral Q6H PRN Vivi Moyer MD   12.5 mg at 02/20/22 2305    Or    ondansetron (ZOFRAN) injection 4 mg  4 mg IntraVENous Q6H PRN Vivi Moyer MD   4 mg at 02/21/22 2225    labetalol (NORMODYNE;TRANDATE) injection 10 mg  10 mg IntraVENous Q6H PRN Vivi Moyer MD           Allergies:  Ceftriaxone    Social History:   reports that she has been smoking cigarettes. She has been smoking about 1.00 pack per day. She uses smokeless tobacco. She reports previous alcohol use. She reports that she does not use drugs.      Family History: family history includes Alcohol Abuse in her mother; Cirrhosis in her mother. REVIEW OF SYSTEMS:      Patient not able to give any meaningful history due to altered mental status    PHYSICAL EXAM:        BP (!) 169/66   Pulse 88   Temp 98.6 °F (37 °C) (Axillary)   Resp 16   Wt 168 lb (76.2 kg)   SpO2 98%   BMI 27.96 kg/m²    Temp (24hrs), Av.5 °F (36.9 °C), Min:98.2 °F (36.8 °C), Max:98.8 °F (37.1 °C)      General appearance -sick appearing, no in pain or distress   Mental status -confused  Eyes - pupils equal and reactive, extraocular eye movements intact   Ears - bilateral TM's and external ear canals normal   Mouth - mucous membranes moist, pharynx normal without lesions. NG tube in place  Neck - supple, no significant adenopathy   Lymphatics - no palpable lymphadenopathy, no hepatosplenomegaly   Chest - clear to auscultation, no wheezes, rales or rhonchi, symmetric air entry   Heart - normal rate, regular rhythm, normal S1, S2, no murmurs  Abdomen - soft, nontender, nondistended, no masses or organomegaly   Neurological -confused and not following commands  Musculoskeletal - no joint tenderness, deformity or swelling   Extremities - peripheral pulses normal, no pedal edema, no clubbing or cyanosis   Skin - normal coloration and turgor, no rashes, no suspicious skin lesions noted ,      DATA:      Labs:     Results for orders placed or performed during the hospital encounter of 22   Culture, Urine    Specimen: Urine, clean catch   Result Value Ref Range    Specimen Description . CLEAN CATCH URINE     Culture ESCHERICHIA COLI 10 to 50,000 CFU/ML        Susceptibility    Escherichia coli - BACTERIAL SUSCEPTIBILITY PANEL TAQUERIA     ampicillin <=2 Sensitive      aztreonam <=1 Sensitive      ceFAZolin* <=4 Sensitive       * Cefazolin sensitivity results can be used to predict the effectiveness of oral cephalosporins (eg.  Cephalexin) in uncomplicated Urinary Tract Infections due to E. coli, K. pneumoniae, and P. mirabilis cefTRIAXone <=1 Sensitive      ciprofloxacin <=0.25 Sensitive      Confirmatory Extended Spectrum Beta-Lactamase NEGATIVE Negative      gentamicin <=1 Sensitive      nitrofurantoin <=16 Sensitive      tobramycin <=1 Sensitive      trimethoprim-sulfamethoxazole <=20 Sensitive      piperacillin-tazobactam <=4 Sensitive    Culture, Blood 2    Specimen: Blood   Result Value Ref Range    Specimen Description . BLOOD     Special Requests L HAND 2ML     Culture NO GROWTH 2 DAYS    Culture, Blood 1    Specimen: Blood   Result Value Ref Range    Specimen Description . BLOOD     Special Requests R HAND 20ML     Culture NO GROWTH 2 DAYS    CBC with Auto Differential   Result Value Ref Range    WBC 20.0 (H) 3.5 - 11.3 k/uL    RBC 4.08 3.95 - 5.11 m/uL    Hemoglobin 11.9 11.9 - 15.1 g/dL    Hematocrit 39.5 36.3 - 47.1 %    MCV 96.8 82.6 - 102.9 fL    MCH 29.2 25.2 - 33.5 pg    MCHC 30.1 28.4 - 34.8 g/dL    RDW 17.7 (H) 11.8 - 14.4 %    Platelets 283 (H) 888 - 453 k/uL    MPV 9.3 8.1 - 13.5 fL    NRBC Automated 0.0 0.0 per 100 WBC    Differential Type NOT REPORTED     WBC Morphology NOT REPORTED     RBC Morphology NOT REPORTED     Platelet Estimate NOT REPORTED     Immature Granulocytes 0 0 %    Seg Neutrophils 88 (H) 36 - 66 %    Lymphocytes 1 (L) 24 - 44 %    Monocytes 11 (H) 1 - 7 %    Eosinophils % 0 (L) 1 - 4 %    Basophils 0 0 - 2 %    Absolute Immature Granulocyte 0.00 0.00 - 0.30 k/uL    Segs Absolute 17.60 (H) 1.8 - 7.7 k/uL    Absolute Lymph # 0.20 (L) 1.0 - 4.8 k/uL    Absolute Mono # 2.20 (H) 0.1 - 0.8 k/uL    Absolute Eos # 0.00 0.0 - 0.4 k/uL    Basophils Absolute 0.00 0.0 - 0.2 k/uL    Morphology ANISOCYTOSIS PRESENT    Comprehensive Metabolic Panel w/ Reflex to MG   Result Value Ref Range    Glucose 150 (H) 70 - 99 mg/dL    BUN 13 6 - 20 mg/dL    CREATININE 0.47 (L) 0.50 - 0.90 mg/dL    Bun/Cre Ratio NOT REPORTED 9 - 20    Calcium 9.0 8.6 - 10.4 mg/dL    Sodium 142 135 - 144 mmol/L    Potassium 3.4 (L) 3.7 - 5.3 mmol/L    Chloride 103 98 - 107 mmol/L    CO2 22 20 - 31 mmol/L    Anion Gap 17 9 - 17 mmol/L    Alkaline Phosphatase 65 35 - 104 U/L    ALT 7 5 - 33 U/L    AST 12 <32 U/L    Total Bilirubin 0.16 (L) 0.3 - 1.2 mg/dL    Total Protein 6.7 6.4 - 8.3 g/dL    Albumin 4.2 3.5 - 5.2 g/dL    Albumin/Globulin Ratio 1.7 1.0 - 2.5    GFR Non-African American >60 >60 mL/min    GFR African American >60 >60 mL/min    GFR Comment          GFR Staging NOT REPORTED    Lipase   Result Value Ref Range    Lipase 13 13 - 60 U/L   Troponin   Result Value Ref Range    Troponin, High Sensitivity 51 (H) 0 - 14 ng/L    Troponin T NOT REPORTED <0.03 ng/mL    Troponin Interp NOT REPORTED    Urinalysis with Microscopic   Result Value Ref Range    Color, UA Yellow Yellow    Turbidity UA Turbid (A) Clear    Glucose, Ur 2+ (A) NEGATIVE    Bilirubin Urine NEGATIVE NEGATIVE    Ketones, Urine NEGATIVE NEGATIVE    Specific Gravity, UA 1.017 1.005 - 1.030    Urine Hgb NEGATIVE NEGATIVE    pH, UA 5.5 5.0 - 8.0    Protein, UA NEGATIVE NEGATIVE    Urobilinogen, Urine Normal Normal    Nitrite, Urine NEGATIVE NEGATIVE    Leukocyte Esterase, Urine NEGATIVE NEGATIVE    -          WBC, UA None 0 - 5 /HPF    RBC, UA 0 TO 2 0 - 4 /HPF    Casts UA NOT REPORTED 0 - 8 /LPF    Crystals, UA NOT REPORTED None /HPF    Epithelial Cells UA 0 TO 2 0 - 5 /HPF    Renal Epithelial, UA NOT REPORTED 0 /HPF    Bacteria, UA NOT REPORTED None    Mucus, UA NOT REPORTED None    Trichomonas, UA NOT REPORTED None    Amorphous, UA NOT REPORTED None    Other Observations UA NOT REPORTED NOT REQ.     Yeast, UA NOT REPORTED None   Lactate, Sepsis   Result Value Ref Range    Lactic Acid, Sepsis NOT REPORTED 0.5 - 1.9 mmol/L    Lactic Acid, Sepsis, Whole Blood 2.8 (H) 0.5 - 1.9 mmol/L   Lactate, Sepsis   Result Value Ref Range    Lactic Acid, Sepsis NOT REPORTED 0.5 - 1.9 mmol/L    Lactic Acid, Sepsis, Whole Blood 1.5 0.5 - 1.9 mmol/L   Lamotrigine Level   Result Value Ref Range Lamotrigine Lvl 1.3 (L) 3.0 - 15.0 ug/mL   Levetiracetam Level   Result Value Ref Range    Levetiracetam Lvl 22 ug/mL   Troponin   Result Value Ref Range    Troponin, High Sensitivity 57 (HH) 0 - 14 ng/L    Troponin T NOT REPORTED <0.03 ng/mL    Troponin Interp NOT REPORTED    Magnesium   Result Value Ref Range    Magnesium 1.8 1.6 - 2.6 mg/dL   Brain Natriuretic Peptide   Result Value Ref Range    Pro- (H) <300 pg/mL    BNP Interpretation NOT REPORTED    Basic Metabolic Panel w/ Reflex to MG   Result Value Ref Range    Glucose 109 (H) 70 - 99 mg/dL    BUN 8 6 - 20 mg/dL    CREATININE 0.39 (L) 0.50 - 0.90 mg/dL    Bun/Cre Ratio NOT REPORTED 9 - 20    Calcium 8.9 8.6 - 10.4 mg/dL    Sodium 137 135 - 144 mmol/L    Potassium 3.6 (L) 3.7 - 5.3 mmol/L    Chloride 103 98 - 107 mmol/L    CO2 22 20 - 31 mmol/L    Anion Gap 12 9 - 17 mmol/L    GFR Non-African American >60 >60 mL/min    GFR African American >60 >60 mL/min    GFR Comment          GFR Staging NOT REPORTED    CBC with Auto Differential   Result Value Ref Range    WBC 14.1 (H) 3.5 - 11.3 k/uL    RBC 3.56 (L) 3.95 - 5.11 m/uL    Hemoglobin 10.5 (L) 11.9 - 15.1 g/dL    Hematocrit 33.7 (L) 36.3 - 47.1 %    MCV 94.7 82.6 - 102.9 fL    MCH 29.5 25.2 - 33.5 pg    MCHC 31.2 28.4 - 34.8 g/dL    RDW 17.4 (H) 11.8 - 14.4 %    Platelets 212 (H) 770 - 453 k/uL    MPV 9.4 8.1 - 13.5 fL    NRBC Automated 0.0 0.0 per 100 WBC    Differential Type NOT REPORTED     WBC Morphology NOT REPORTED     RBC Morphology ANISOCYTOSIS PRESENT     Platelet Estimate NOT REPORTED     Seg Neutrophils 91 (H) 36 - 65 %    Lymphocytes 6 (L) 24 - 43 %    Monocytes 3 3 - 12 %    Eosinophils % 0 (L) 1 - 4 %    Basophils 0 0 - 2 %    Immature Granulocytes 1 (H) 0 %    Segs Absolute 12.77 (H) 1.50 - 8.10 k/uL    Absolute Lymph # 0.78 (L) 1.10 - 3.70 k/uL    Absolute Mono # 0.38 0.10 - 1.20 k/uL    Absolute Eos # <0.03 0.00 - 0.44 k/uL    Basophils Absolute 0.05 0.00 - 0.20 k/uL    Absolute Immature Granulocyte 0.08 0.00 - 0.30 k/uL   Lamotrigine Level   Result Value Ref Range    Lamotrigine Lvl 1.3 (L) 3.0 - 15.0 ug/mL   Levetiracetam Level   Result Value Ref Range    Levetiracetam Lvl 4 ug/mL   DRUG SCREEN MULTI URINE   Result Value Ref Range    Amphetamine Screen, Ur NEGATIVE NEGATIVE    Barbiturate Screen, Ur NEGATIVE NEGATIVE    Benzodiazepine Screen, Urine NEGATIVE NEGATIVE    Cocaine Metabolite, Urine NEGATIVE NEGATIVE    Methadone Screen, Urine NEGATIVE NEGATIVE    Opiates, Urine POSITIVE (A) NEGATIVE    Phencyclidine, Urine NEGATIVE NEGATIVE    Propoxyphene, Urine NOT REPORTED NEGATIVE    Cannabinoid Scrn, Ur NEGATIVE NEGATIVE    Oxycodone Screen, Ur NEGATIVE NEGATIVE    Methamphetamine, Urine NOT REPORTED NEGATIVE    Tricyclic Antidepressants, Urine NOT REPORTED NEGATIVE    MDMA, Urine NOT REPORTED NEGATIVE    Buprenorphine Urine NOT REPORTED NEGATIVE    Test Information       Assay provides medical screening only. The absence of expected drug(s) and/or metabolite(s) may indicate diluted or adulterated urine, limitations of testing or timing of collection.    Troponin   Result Value Ref Range    Troponin, High Sensitivity 17 (H) 0 - 14 ng/L    Troponin T NOT REPORTED <0.03 ng/mL    Troponin Interp NOT REPORTED    Troponin   Result Value Ref Range    Troponin, High Sensitivity 14 0 - 14 ng/L    Troponin T NOT REPORTED <0.03 ng/mL    Troponin Interp NOT REPORTED    Prolactin   Result Value Ref Range    Prolactin 4.52 (L) 4.79 - 23.30 ug/L   Procalcitonin   Result Value Ref Range    Procalcitonin 0.25 (H) <0.09 ng/mL   Troponin   Result Value Ref Range    Troponin, High Sensitivity 16 (H) 0 - 14 ng/L    Troponin T NOT REPORTED <0.03 ng/mL    Troponin Interp NOT REPORTED    CK   Result Value Ref Range    Total  26 - 192 U/L   Myoglobin, Serum   Result Value Ref Range    Myoglobin 62 (H) 25 - 58 ng/mL   Basic Metabolic Panel w/ Reflex to MG   Result Value Ref Range    Glucose 103 (H) 70 - 99 mg/dL    BUN 8 6 - 20 mg/dL    CREATININE 0.35 (L) 0.50 - 0.90 mg/dL    Calcium 9.1 8.6 - 10.4 mg/dL    Sodium 137 135 - 144 mmol/L    Potassium 3.6 (L) 3.7 - 5.3 mmol/L    Chloride 100 98 - 107 mmol/L    CO2 23 20 - 31 mmol/L    Anion Gap 14 9 - 17 mmol/L    GFR Non-African American >60 >60 mL/min    GFR African American >60 >60 mL/min    GFR Comment         CBC with Auto Differential   Result Value Ref Range    WBC 14.2 (H) 3.5 - 11.3 k/uL    RBC 3.88 (L) 3.95 - 5.11 m/uL    Hemoglobin 11.2 (L) 11.9 - 15.1 g/dL    Hematocrit 38.7 36.3 - 47.1 %    MCV 99.7 82.6 - 102.9 fL    MCH 28.9 25.2 - 33.5 pg    MCHC 28.9 28.4 - 34.8 g/dL    RDW 17.3 (H) 11.8 - 14.4 %    Platelets 894 (H) 038 - 453 k/uL    MPV 9.6 8.1 - 13.5 fL    NRBC Automated 0.0 0.0 per 100 WBC    RBC Morphology ANISOCYTOSIS PRESENT     Seg Neutrophils 91 (H) 36 - 65 %    Lymphocytes 7 (L) 24 - 43 %    Monocytes 1 (L) 3 - 12 %    Eosinophils % 0 (L) 1 - 4 %    Basophils 1 0 - 2 %    Immature Granulocytes 1 (H) 0 %    Segs Absolute 13.01 (H) 1.50 - 8.10 k/uL    Absolute Lymph # 0.92 (L) 1.10 - 3.70 k/uL    Absolute Mono # 0.15 0.10 - 1.20 k/uL    Absolute Eos # <0.03 0.00 - 0.44 k/uL    Basophils Absolute 0.07 0.00 - 0.20 k/uL    Absolute Immature Granulocyte 0.07 0.00 - 0.30 k/uL   Ferritin   Result Value Ref Range    Ferritin 202 (H) 13 - 150 ug/L   Iron and TIBC   Result Value Ref Range    Iron 46 37 - 145 ug/dL    TIBC 256 250 - 450 ug/dL    Iron Saturation 18 (L) 20 - 55 %    UIBC 210 112 - 347 ug/dL   Vitamin B12 & Folate   Result Value Ref Range    Vitamin B-12 313 232 - 1245 pg/mL    Folate 14.4 >4.8 ng/mL   Magnesium   Result Value Ref Range    Magnesium 1.7 1.6 - 2.6 mg/dL   POC Glucose Fingerstick   Result Value Ref Range    POC Glucose 173 (H) 65 - 105 mg/dL   EKG 12 Lead   Result Value Ref Range    Ventricular Rate 90 BPM    Atrial Rate 90 BPM    P-R Interval 162 ms    QRS Duration 90 ms    Q-T Interval 386 ms    QTc Calculation (Bazett) 472 ms    P Axis 49 degrees    R Axis 87 degrees    T Axis 55 degrees   EKG 12 Lead   Result Value Ref Range    Ventricular Rate 99 BPM    Atrial Rate 99 BPM    P-R Interval 172 ms    QRS Duration 92 ms    Q-T Interval 364 ms    QTc Calculation (Bazett) 467 ms    P Axis 45 degrees    R Axis -5 degrees    T Axis 36 degrees         IMAGING DATA:    XR ABDOMEN (KUB) (SINGLE AP VIEW)    Result Date: 2/20/2022  EXAMINATION: ONE SUPINE XRAY VIEW(S) OF THE ABDOMEN 2/20/2022 11:18 pm COMPARISON: January 4, 2021 HISTORY: ORDERING SYSTEM PROVIDED HISTORY: rule out ileus TECHNOLOGIST PROVIDED HISTORY: rule out ileus Reason for Exam: supine,nausea,vomiting FINDINGS: 37 mm calcified lesion right upper quadrant may represent a gallstone. New metallic coils noted in the left upper quadrant. A catheter is noted extending from the left upper quadrant to the pelvis and appears unchanged. Gas-filled loops of small large bowel. Increased stool. Osseous structures normal.  Intravenous contrast is noted in the bladder. Ileus. Probable gallstone. Catheter left abdomen and pelvis requires clinical correlation. CT Head WO Contrast    Result Date: 2/20/2022  EXAMINATION: CT OF THE HEAD WITHOUT CONTRAST  2/20/2022 1:44 pm TECHNIQUE: CT of the head was performed without the administration of intravenous contrast. Dose modulation, iterative reconstruction, and/or weight based adjustment of the mA/kV was utilized to reduce the radiation dose to as low as reasonably achievable. COMPARISON: 06/18/2021, MRI 06/18/2021 HISTORY: ORDERING SYSTEM PROVIDED HISTORY:  Eavl for mets TECHNOLOGIST PROVIDED HISTORY: Eavl for mets Decision Support Exception - unselect if not a suspected or confirmed emergency medical condition->Emergency Medical Condition (MA) Reason for Exam:  Eval for mets FINDINGS: BRAIN/VENTRICLES: There is no acute intracranial hemorrhage, mass effect or midline shift. No abnormal extra-axial fluid collection.   The gray-white differentiation is maintained without evidence of an acute infarct. There is no evidence of hydrocephalus. Subtle low attenuation in the deep white matter which is nonspecific likely due to chronic small vessel ischemia. Overall appearance is similar to the prior study. CT with contrast or MRI would be more sensitive to evaluate for metastatic disease. ORBITS: The visualized portion of the orbits demonstrate no acute abnormality. SINUSES: The visualized paranasal sinuses demonstrate no acute abnormality. Tiny retention cyst versus focal mucosal thickening left maxillary sinus. Minimal fluid/opacification left mastoid air cells. SOFT TISSUES/SKULL:  No acute abnormality of the visualized skull or soft tissues. Similar small right temporal flat skin lesion. No acute intracranial abnormality. XR CHEST PORTABLE    Result Date: 2/20/2022  EXAMINATION: ONE XRAY VIEW OF THE CHEST 2/20/2022 11:51 am COMPARISON: 06/22/2021, 06/21/2021 HISTORY: ORDERING SYSTEM PROVIDED HISTORY: eval for pmn TECHNOLOGIST PROVIDED HISTORY: eval for pmn FINDINGS: The cardiomediastinal silhouette is unchanged in appearance. Right internal jugular port in place. Generalized interstitial prominence again noted. There is no consolidation, pneumothorax, or evidence of edema. No effusion is appreciated. The osseous structures are unchanged in appearance. Vascular coil pack in the left upper quadrant. Chronic findings in the chest without acute airspace disease identified. CT CHEST PULMONARY EMBOLISM W CONTRAST    Result Date: 2/20/2022  EXAMINATION: CTA OF THE CHEST 2/20/2022 1:48 pm TECHNIQUE: CTA of the chest was performed after the administration of intravenous contrast.  Multiplanar reformatted images are provided for review. MIP images are provided for review. Dose modulation, iterative reconstruction, and/or weight based adjustment of the mA/kV was utilized to reduce the radiation dose to as low as reasonably achievable. COMPARISON: CT chest 03/09/2021. CT abdomen and pelvis 10/29/2021. HISTORY: ORDERING SYSTEM PROVIDED HISTORY: eval for pe TECHNOLOGIST PROVIDED HISTORY: eval for pe Decision Support Exception - unselect if not a suspected or confirmed emergency medical condition->Emergency Medical Condition (MA) Reason for Exam: eval for pe FINDINGS: Pulmonary Arteries: Pulmonary arteries are adequately opacified for evaluation. No evidence of intraluminal filling defect to suggest pulmonary embolism. Main pulmonary artery is normal in caliber. Mediastinum: Mildly enlarged confluent hilar lymph nodes and lymphatic tissue in the AP window and subcarinal regions again demonstrated. Small pericardial effusion, similar in appearance. There is no acute abnormality of the thoracic aorta. Right internal jugular port in place. Lungs/pleura: Respiratory motion artifact noted. Mild septal thickening. Persistent nodular opacity in the left lower lobe on axial image 63 measuring up to 2.6 cm. Scattered ground-glass nodules are again demonstrated bilaterally. The amount of left pleural fluid has diminished in the interval with trace amount remaining. Soft tissue calcification near the supra [place] IVC again demonstrated. Upper Abdomen: Coarse calcification near the medial and inferior aspect of the spleen partially visualized, as seen previously. Dense metallic artifact corresponding to metallic coil pack near the spleen again demonstrated. Cholelithiasis. Soft Tissues/Bones: Blastic metastatic deposits predominantly in the lower thoracic and upper lumbar spine again demonstrated. 1.  No evidence for acute pulmonary embolism. 2.  Bilateral pulmonary nodules, thoracic lymphadenopathy, soft tissue calcifications in the chest and upper abdomen and sclerotic bone lesions again demonstrated, as described previously, which may be related to the patient's history of malignancy. 3.  Small pericardial effusion.   Trace left pleural effusion. 4.  Mild septal thickening suggestive of interstitial edema. IR PLACE NG TUBE BY DR Tc Leggett    Result Date: 2/21/2022  PROCEDURE: XR PLACE NASOGASTRIC TUBE PHYS 2/21/2022 HISTORY: ORDERING SYSTEM PROVIDED HISTORY: ileus TECHNOLOGIST PROVIDED HISTORY: ileus Ileus CONTRAST: None SEDATION: None FLUOROSCOPY DOSE AND TYPE OR TIME AND EXPOSURES: Fluoro time 0.7 minutes  cGy cm 2 DESCRIPTION OF PROCEDURE: This procedure was performed by Viraj DAVID under indirect supervision of . [place] protocol was observed. An attempt was made to pass a 12 [place] sump through right and left nostrils without success due to stricture in the nasal cavity. Under fluoroscopic guidance, through the left nostril, a 12 [place] sump nasogastric tube was negotiated into the stomach. With catheter manipulation, the tip of the catheter was manipulated into the distal stomach/proximal duodenum. Air bolus administration confirmed satisfactory tip position. The catheter was secured appropriately at 74 cm. Estimated blood loss was 0 mL. The patient tolerated the procedure well. Successful placement of nasogastric tube. Okay to use.          IMPRESSION:   Primary Problem  Dehydration    Active Hospital Problems    Diagnosis Date Noted    Acute metabolic encephalopathy [K40.17]     Confusion [R41.0]     Urinary tract infection without hematuria [N39.0]     Seizure disorder ([address] [G40.909]     Lactic acidosis [E87.2] 02/21/2022    Ileus ([address] [K56.7] 02/21/2022    Elevated troponin [R77.8] 02/21/2022    Pericardial effusion [I31.3] 02/21/2022    Hypokalemia [E87.6] 02/21/2022    Acute respiratory failure with hypoxia ([address] [J96.01] 02/21/2022    Dehydration [E86.0] 02/20/2022    AMS (altered mental status) [R41.82] 02/20/2022    Ovarian cancer ([address] [C56.9] 05/29/2021    Chronic deep vein thrombosis (DVT) of femoral vein of left lower extremity (HCC) [I82.512] 03/11/2021    Fatigue [R53.83]     Cancer, metastatic to bone [facility]) [C79.51] 01/28/2021    Type 2 diabetes mellitus without complication, without long-term current use of insulin ([facility] [E11.9]     Seizure ([facility] [R56.9] 10/21/2020       Recurrent ovarian cancer chemotherapy using gemcitabine with stable CA-125  Abdominal pain nausea vomiting  Ileus per KUB  Seizure disorder  DVT of femoral vein on anticoagulation using Eliquis      RECOMMENDATIONS:  1. I personally reviewed results of lab work-up imaging studies and other relevant clinical data. 2. Awaiting CT abdomen pelvis results  3. CA-125 has been added stable  4. Continue symptomatic supportive care  5. Follow-up with neurology recommendations for further assessment of altered mental status. 6. Ovarian cancer metastasizes in contiguous fashion unlikely to metastasis to the brain but cannot be completely ruled out. Follow-up on MRI brain          Discussed with patient and Nurse. Thank you for asking us to see this patient. Amy Newberry MD          This note is created with the assistance of a speech recognition program.  While intending to generate a document that actually reflects the content of the visit, the document can still have some errors including those of syntax and sound a like substitutions which may escape proof reading. It such instances, actual meaning can be extrapolated by contextual diversion.

## 2022-02-22 NOTE — PLAN OF CARE
Problem: Pain:  Goal: Pain level will decrease  Description: Pain level will decrease  2/22/2022 0556 by Stephani Beanvides RN  Outcome: Ongoing  2/21/2022 1749 by Lorine Nissen, RN  Outcome: Ongoing  Goal: Control of acute pain  Description: Control of acute pain  2/22/2022 0556 by Stephani Benavides RN  Outcome: Ongoing  2/21/2022 1749 by Lorine Nissen, RN  Outcome: Ongoing  Goal: Control of chronic pain  Description: Control of chronic pain  2/22/2022 0556 by Stephani Benavides RN  Outcome: Ongoing  2/21/2022 1749 by Lorine Nissen, RN  Outcome: Ongoing

## 2022-02-22 NOTE — CONSULTS
splenic involvement and lung mets. CT brain was unremarkable on 2/20/22. Upon my evaluation, patient is alert and oriented x3, follows commands and has a non-focal neurological examination.  and Myoglobin 62. Urine culture positive for E. Coli, patient currently on Unasyn. Past Medical History:     Past Medical History:   Diagnosis Date    Anemia     Bleeding 10/2020    intra-abdominal bleeding -due to splenic mass with GI infiltration. Status post embolization    [facility]     Depression     Diabetes mellitus ([facility]     GERD (gastroesophageal reflux disease)     Hx of blood clots     Hypertension     Metastatic cancer ([facility] 10/2020    extensive intraabdominal and splenic involvement and lung mets.  Ovarian cancer ([facility]     low grade serous ovarian carcinoma    Post chemo evaluation     2007: Chemo via med onc (Dr. Ginette Grover), 2008: Yolande Karely due to rising CA-125, 2013: intraperitoneal chemo,12/2015: Ca125 - 25     Splenic lesion         Past Surgical History:     Past Surgical History:   Procedure Laterality Date    ABSCESS DRAINAGE  2013    Franca rectal    ANUS SURGERY      ANAL FISSURECTOMY    CARDIAC CATHETERIZATION      COLECTOMY  03/2013    ex lap, tumor debulking, transverse colectomy w reanastamosis, subgastric omentectomy, intraperitoneal port placement    HYSTERECTOMY, TOTAL ABDOMINAL      IR EMBOLIZATION HEMORRHAGE  10/05/2020    intra-abdominal bleeding -due to splenic mass with GI infiltration. Status post embolization [employer] interlock coils x7. mri condtional 3t ok, safe immediately post implant.  IR PORT PLACEMENT EQUAL OR GREATER THAN 5 YEARS  08/24/2020    IR PORT PLACEMENT EQUAL OR GREATER THAN 5 YEARS 8/24/2020 Dayton Aquino MD [facility] SPECIAL PROCEDURES    PORT SURGERY      IP Port    TONSILLECTOMY          Medications Prior to Admission:     Prior to Admission medications    Medication Sig Start Date End Date Taking?  Authorizing Provider LORazepam (ATIVAN) 1 MG tablet Take 1 tablet by mouth every 8 hours as needed for Anxiety for up to 30 doses. 2/18/22 4/2/22  Ravinder Bradley MD   polyethylene glycol (MIRALAX) 17 g packet Take 17 g by mouth daily as needed for Constipation 2/18/22 3/20/22  Ravinder Bradley MD   lamoTRIgine (LAMICTAL) 25 MG tablet TAKE 3 TABS IN IN THE MORNING AND 3 TABS IN IN THE EVENING 2/9/22   Jr Carson MD   levETIRAcetam (KEPPRA) 750 MG tablet Take 2 tablets by mouth 2 times daily 2/9/22   Ortega Elizabeth MD   diazePAM 5 MG/0.1ML LIQD 1 puff by Nasal route as needed (seizures)  Patient not taking: Reported on 2/18/2022 2/9/22   Ortega Elizabeth MD   morphine (MS CONTIN) 30 MG extended release tablet TAKE 1 TABLET BY MOUTH 2 TIMES DAILY FOR 30 DAYS. 1/27/22 2/26/22  Santino Bhatia MD   morphine (MS CONTIN) 15 MG extended release tablet TAKE 1 TABLET BY MOUTH 2 TIMES DAILY FOR 30 DAYS. 1/27/22 2/26/22  Ravinder Bradley MD   oxyCODONE-acetaminophen (PERCOCET) 5-325 MG per tablet TAKE 1 TABLET BY MOUTH EVERY 4 HOURS AS NEEDED FOR PAIN (BREAKTHROUGH PAIN) - REDUCE DOSES TAKEN AS PAIN BECOMES MANAGEABLE 1/27/22 2/26/22  Ravinder Bradley MD   pantoprazole (PROTONIX) 40 MG tablet Take 1 tablet by mouth daily 1/3/22   Ravinder Bradley MD   ondansetron (ZOFRAN-ODT) 4 MG disintegrating tablet Take 1 tablet by mouth every 8 hours as needed for Nausea or Vomiting 12/3/21   Ravinder Bradley MD   naloxegol (MOVANTIK) 12.5 MG TABS tablet Take 1 tablet by mouth every morning  Patient not taking: Reported on 2/18/2022 11/15/21   Ravinder Bradley MD   ferrous sulfate (IRON 325) 325 (65 Fe) MG tablet Take 1 tablet by mouth daily (with breakfast) 10/31/21   Madeline Medrano MD   morphine (MSIR) 30 MG tablet Take 30 mg by mouth 2 times daily as needed for Pain.     Historical Provider, MD   sertraline (ZOLOFT) 100 MG tablet TAKE 1 TABLET BY MOUTH DAILY 10/4/21 2/18/22  Ravinder Bradley MD   ELIQUIS 5 MG TABS tablet Take 5 mg by mouth 2 times daily  21   Historical Provider, MD        Allergies:     Ceftriaxone    Social History:     Tobacco:    reports that she has been smoking cigarettes. She has been smoking about 1.00 pack per day. She uses smokeless tobacco.  Alcohol:      reports previous alcohol use. Drug Use:  reports no history of drug use. Family History:     Family History   Problem Relation Age of Onset    Alcohol Abuse Mother     Cirrhosis Mother        Review of Systems:     Review of Systems      Physical Exam:   BP (!) 137/45   Pulse 68   Temp 99.2 °F (37.3 °C) (Oral)   Resp 16   SpO2 98%   Temp (24hrs), Av °F (37.2 °C), Min:98.8 °F (37.1 °C), Max:99.2 °F (37.3 °C)    No results for input(s): POCGLU in the last 72 hours. Intake/Output Summary (Last 24 hours) at 2022 1412  Last data filed at 2022 0558  Gross per 24 hour   Intake 807.14 ml   Output --   Net 807.14 ml         Neurologic Exam     Mental Status   Oriented to person, place, and time. Cranial Nerves   Cranial nerves II through XII intact. Motor Exam     Strength   Strength 5/5 throughout.      Sensory Exam   Light touch normal.       GENERAL  Appears comfortable and in no distress   HEENT  NC/ AT   HEART  S1 and S2 heard; palpation of pulses: radial pulse    NECK  Supple and no bruits heard   MENTAL STATUS:  Alert, oriented, intact memory, no confusion, normal speech, normal language, no hallucination or delusion   CRANIAL NERVES: II     -      Visual fields intact to confrontation  III,IV,VI -  PERR, EOMs full, no ptosis  V     -     Normal facial sensation   VII    -     Normal facial symmetry  VIII   -     Intact hearing   IX,X -     Symmetrical palate  XI    -     Symmetrical shoulder shrug  XII   -     Midline tongue, no atrophy    MOTOR FUNCTION: RUE: Significant for good strength of grade 5/5 in proximal and distal muscle groups   LUE: Significant for good strength of grade 5/5 in proximal and distal muscle groups   RLE: Significant for good strength of grade 5/5 in proximal and distal muscle groups   LLE: Significant for good strength of grade 5/5 in proximal and distal muscle groups      Normal bulk, normal tone and no involuntary movements, no tremor   SENSORY FUNCTION:  Normal touch, normal pinprick, normal vibration, normal proprioception   CEREBELLAR FUNCTION:  Intact fine motor control over upper limbs and lower limbs   REFLEX FUNCTION:  Symmetric in upper and lower extremities, no Babinski sign   STATION and GAIT Not accessed     Imaging:   CT Head WO Contrast     Result Date: 2/20/2022  No acute intracranial abnormality.      Investigations:      Laboratory Testing:  Recent Results (from the past 24 hour(s))   Lamotrigine Level    Collection Time: 02/20/22 10:48 PM   Result Value Ref Range    Lamotrigine Lvl 1.3 (L) 3.0 - 15.0 ug/mL   Levetiracetam Level    Collection Time: 02/20/22 10:48 PM   Result Value Ref Range    Levetiracetam Lvl 4 ug/mL   Troponin    Collection Time: 02/20/22 10:48 PM   Result Value Ref Range    Troponin, High Sensitivity 17 (H) 0 - 14 ng/L    Troponin T NOT REPORTED <0.03 ng/mL    Troponin Interp NOT REPORTED    Prolactin    Collection Time: 02/20/22 10:48 PM   Result Value Ref Range    Prolactin 4.52 (L) 4.79 - 23.30 ug/L   DRUG SCREEN MULTI URINE    Collection Time: 02/21/22  5:58 AM   Result Value Ref Range    Amphetamine Screen, Ur NEGATIVE NEGATIVE    Barbiturate Screen, Ur NEGATIVE NEGATIVE    Benzodiazepine Screen, Urine NEGATIVE NEGATIVE    Cocaine Metabolite, Urine NEGATIVE NEGATIVE    Methadone Screen, Urine NEGATIVE NEGATIVE    Opiates, Urine POSITIVE (A) NEGATIVE    Phencyclidine, Urine NEGATIVE NEGATIVE    Propoxyphene, Urine NOT REPORTED NEGATIVE    Cannabinoid Scrn, Ur NEGATIVE NEGATIVE    Oxycodone Screen, Ur NEGATIVE NEGATIVE    Methamphetamine, Urine NOT REPORTED NEGATIVE    Tricyclic Antidepressants, Urine NOT REPORTED NEGATIVE    MDMA, Urine NOT REPORTED NEGATIVE    Buprenorphine Urine NOT REPORTED NEGATIVE    Test Information       Assay provides medical screening only. The absence of expected drug(s) and/or metabolite(s) may indicate diluted or adulterated urine, limitations of testing or timing of collection.    Basic Metabolic Panel w/ Reflex to MG    Collection Time: 02/21/22  6:10 AM   Result Value Ref Range    Glucose 109 (H) 70 - 99 mg/dL    BUN 8 6 - 20 mg/dL    CREATININE 0.39 (L) 0.50 - 0.90 mg/dL    Bun/Cre Ratio NOT REPORTED 9 - 20    Calcium 8.9 8.6 - 10.4 mg/dL    Sodium 137 135 - 144 mmol/L    Potassium 3.6 (L) 3.7 - 5.3 mmol/L    Chloride 103 98 - 107 mmol/L    CO2 22 20 - 31 mmol/L    Anion Gap 12 9 - 17 mmol/L    GFR Non-African American >60 >60 mL/min    GFR African American >60 >60 mL/min    GFR Comment          GFR Staging NOT REPORTED    CBC with Auto Differential    Collection Time: 02/21/22  6:10 AM   Result Value Ref Range    WBC 14.1 (H) 3.5 - 11.3 k/uL    RBC 3.56 (L) 3.95 - 5.11 m/uL    Hemoglobin 10.5 (L) 11.9 - 15.1 g/dL    Hematocrit 33.7 (L) 36.3 - 47.1 %    MCV 94.7 82.6 - 102.9 fL    MCH 29.5 25.2 - 33.5 pg    MCHC 31.2 28.4 - 34.8 g/dL    RDW 17.4 (H) 11.8 - 14.4 %    Platelets 934 (H) 167 - 453 k/uL    MPV 9.4 8.1 - 13.5 fL    NRBC Automated 0.0 0.0 per 100 WBC    Differential Type NOT REPORTED     WBC Morphology NOT REPORTED     RBC Morphology ANISOCYTOSIS PRESENT     Platelet Estimate NOT REPORTED     Seg Neutrophils 91 (H) 36 - 65 %    Lymphocytes 6 (L) 24 - 43 %    Monocytes 3 3 - 12 %    Eosinophils % 0 (L) 1 - 4 %    Basophils 0 0 - 2 %    Immature Granulocytes 1 (H) 0 %    Segs Absolute 12.77 (H) 1.50 - 8.10 k/uL    Absolute Lymph # 0.78 (L) 1.10 - 3.70 k/uL    Absolute Mono # 0.38 0.10 - 1.20 k/uL    Absolute Eos # <0.03 0.00 - 0.44 k/uL    Basophils Absolute 0.05 0.00 - 0.20 k/uL    Absolute Immature Granulocyte 0.08 0.00 - 0.30 k/uL   Procalcitonin    Collection Time: 02/21/22  6:10 AM   Result Value Ref Range Procalcitonin 0.25 (H) <0.09 ng/mL   Troponin    Collection Time: 02/21/22  6:10 AM   Result Value Ref Range    Troponin, High Sensitivity 16 (H) 0 - 14 ng/L    Troponin T NOT REPORTED <0.03 ng/mL    Troponin Interp NOT REPORTED    CK    Collection Time: 02/21/22  6:10 AM   Result Value Ref Range    Total  26 - 192 U/L   Myoglobin, Serum    Collection Time: 02/21/22  6:10 AM   Result Value Ref Range    Myoglobin 62 (H) 25 - 58 ng/mL   Troponin    Collection Time: 02/21/22 10:30 AM   Result Value Ref Range    Troponin, High Sensitivity 14 0 - 14 ng/L    Troponin T NOT REPORTED <0.03 ng/mL    Troponin Interp NOT REPORTED          Assessment :      Primary Problem  Dehydration    Active Hospital Problems    Diagnosis Date Noted    Lactic acidosis [E87.2] 02/21/2022    Ileus ([facility] [K56.7] 02/21/2022    Elevated troponin [R77.8] 02/21/2022    Pericardial effusion [I31.3] 02/21/2022    Hypokalemia [E87.6] 02/21/2022    Acute respiratory failure with hypoxia (HCC) [J96.01] 02/21/2022    Dehydration [E86.0] 02/20/2022    AMS (altered mental status) [R41.82] 02/20/2022    Ovarian cancer ([facility] [C56.9] 05/29/2021    Chronic deep vein thrombosis (DVT) of femoral vein of left lower extremity (HCC) [I82.512] 03/11/2021    Fatigue [R53.83]     Cancer, metastatic to bone ([facility] [C79.51] 01/28/2021    Type 2 diabetes mellitus without complication, without long-term current use of insulin ([address] [E11.9]     Seizure ([facility] [R56.9] 10/21/2020       Patient is a 60 y.o. Non- / non  female  with a PMH of ovarian cancer with metastasis post chemotherapy/radiotherapy couple of days ago  presented with N/V, chills, fatigue and altered mental status. Patient's urine culture was positive for E coli, currently on Unasyn. Patient admitted to inpatient status for further managament. AMS resolved. Plan:     Dehydration: Likely secondary to emesis. IVF 0.9% NACL infusion at 75 ml/hr  2.    Seizures: Resume her

## 2022-02-22 NOTE — PROGRESS NOTES
[facility] Neurology   [address] [place] [address]    Progress Note             Date:   2/22/2022  Patient name:  Zuleima Reeves  Date of admission:  2/20/2022  9:53 AM  MRN:   [id number]  Account:  [de-identified]  YOB: 1965  PCP:    Tylor Alas MD  Room:   [address] [place] [address]  Code Status:    Full Code    Chief Complaint:     Chief Complaint   Patient presents with    Altered Mental Status    Nausea & Vomiting     Interval hx: The patient was seen and examined at bedside. Is vitally stable, alert and oriented x 0. Overnight patient was confused and agitated. She removed her NG tube last night and was not given Lamictal yesteday. At 3:45 am patient had generalized stiffness with convulsions in all 4 extremities, was given 2mg ativan resulting in cessation of seizure activity. Keppra 1g added once. Patient is currently on 1500mg Keppra BID and was advised to resume Lamictal. MRI Brain and EEG scheduled for today. Brief History of Present Illness:      The patient is a 60 y.o. female with significant past medical history of ovarian cancer with metastasis s/p chemo and radiation therapy, seizure disorder on Lamictal and Keppra who presents with nausea, vomiting and AMS.      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  She had surgical debulking and she had systemic treatment with Taxol and carboplatin for 6 cycles.  Patient was in remission for about 2 years. Natali Shelby had a relapse in 2007 and treated with topotecan with good results.  Patient relapsed again in 2008 and was treated with Taxol carboplatin.  After 3 cycles of systemic chemotherapy she had problems with carboplatin so she finished 3 more cycles with Taxol.  She did well again for 2 to 3 years until she had a relapse in 2012 and she had intraperitoneal chemotherapy treatment with Taxol.  Patient was last seen by her oncologist in 2014 at which time she had relatively stable disease with normal tumor marker and no significant abnormal images.  Patient moved to [place] after that and she was lost for oncology follow-ups. Evelyn Pablo states that she came back to [place], [place] 3 years ago and has been following with an oncologist since. Patient states her last chemo therapy was last week on February 18th, 2022. Re imaging confirmed metastatic relapse. Biopsy confirmed ovarian cancer recurrence. Scan showed extensive intraabdominal and splenic involvement and lung mets. CT brain was unremarkable on 2/20/22.     Upon my evaluation, patient is aphasic, alert, not oriented or following commands. Past Medical History:     Past Medical History:   Diagnosis Date    Anemia     Bleeding 10/2020    intra-abdominal bleeding -due to splenic mass with GI infiltration. Status post embolization    Cervical cancer ([address]     Depression     Diabetes mellitus ([address]     GERD (gastroesophageal reflux disease)     Hx of blood clots     Hypertension     Metastatic cancer ([address] 10/2020    extensive intraabdominal and splenic involvement and lung mets.  Ovarian cancer ([address]     low grade serous ovarian carcinoma    Post chemo evaluation     2007: Chemo via med onc (Dr. Saira Alejandra), 2008: Lopez Maul due to rising CA-125, 2013: intraperitoneal chemo,12/2015: Ca125 - 25     Splenic lesion         Past Surgical History:     Past Surgical History:   Procedure Laterality Date    ABSCESS DRAINAGE  2013    Franca rectal    ANUS SURGERY      ANAL FISSURECTOMY    CARDIAC CATHETERIZATION      COLECTOMY  03/2013    ex lap, tumor debulking, transverse colectomy w reanastamosis, subgastric omentectomy, intraperitoneal port placement    HYSTERECTOMY, TOTAL ABDOMINAL      IR EMBOLIZATION HEMORRHAGE  10/05/2020    intra-abdominal bleeding -due to splenic mass with GI infiltration. Status post embolization [employer] interlock coils x7. mri condtional 3t ok, safe immediately post implant.     IR PORT PLACEMENT EQUAL OR GREATER THAN 5 YEARS  08/24/2020    IR PORT PLACEMENT EQUAL OR GREATER THAN 5 YEARS 8/24/2020 MD SHAHRAM OlivaresZ SPECIAL PROCEDURES    PORT SURGERY      IP Port    TONSILLECTOMY          Medications Prior to Admission:     Prior to Admission medications    Medication Sig Start Date End Date Taking? Authorizing Provider   LORazepam (ATIVAN) 1 MG tablet Take 1 tablet by mouth every 8 hours as needed for Anxiety for up to 30 doses. 2/18/22 4/2/22  Cliff Mart MD   polyethylene glycol (MIRALAX) 17 g packet Take 17 g by mouth daily as needed for Constipation 2/18/22 3/20/22  Cliff Mart MD   lamoTRIgine (LAMICTAL) 25 MG tablet TAKE 3 TABS IN IN THE MORNING AND 3 TABS IN IN THE EVENING 2/9/22   Jr Gonzales MD   levETIRAcetam (KEPPRA) 750 MG tablet Take 2 tablets by mouth 2 times daily 2/9/22   Demi Parra MD   diazePAM 5 MG/0.1ML LIQD 1 puff by Nasal route as needed (seizures)  Patient not taking: Reported on 2/18/2022 2/9/22   Demi Parra MD   morphine (MS CONTIN) 30 MG extended release tablet TAKE 1 TABLET BY MOUTH 2 TIMES DAILY FOR 30 DAYS. 1/27/22 2/26/22  Charmaine Bhatia MD   morphine (MS CONTIN) 15 MG extended release tablet TAKE 1 TABLET BY MOUTH 2 TIMES DAILY FOR 30 DAYS.  1/27/22 2/26/22  Cliff Mart MD   oxyCODONE-acetaminophen (PERCOCET) 5-325 MG per tablet TAKE 1 TABLET BY MOUTH EVERY 4 HOURS AS NEEDED FOR PAIN (BREAKTHROUGH PAIN) - REDUCE DOSES TAKEN AS PAIN BECOMES MANAGEABLE 1/27/22 2/26/22  Cliff Mart MD   pantoprazole (PROTONIX) 40 MG tablet Take 1 tablet by mouth daily 1/3/22   Cliff Mart MD   ondansetron (ZOFRAN-ODT) 4 MG disintegrating tablet Take 1 tablet by mouth every 8 hours as needed for Nausea or Vomiting 12/3/21   Cliff Matr MD   naloxegol (MOVANTIK) 12.5 MG TABS tablet Take 1 tablet by mouth every morning  Patient not taking: Reported on 2/18/2022 11/15/21   Cliff Mart MD ferrous sulfate (IRON 325) 325 (65 Fe) MG tablet Take 1 tablet by mouth daily (with breakfast) 10/31/21   Prema Storey MD   morphine (MSIR) 30 MG tablet Take 30 mg by mouth 2 times daily as needed for Pain. Historical Provider, MD   sertraline (ZOLOFT) 100 MG tablet TAKE 1 TABLET BY MOUTH DAILY 10/4/21 2/18/22  Lyubov Zamora MD   ELIQUIS 5 MG TABS tablet Take 5 mg by mouth 2 times daily  21   Historical Provider, MD        Allergies:     Ceftriaxone    Social History:     Tobacco:    reports that she has been smoking cigarettes. She has been smoking about 1.00 pack per day. She uses smokeless tobacco.  Alcohol:      reports previous alcohol use. Drug Use:  reports no history of drug use.     Family History:     Family History   Problem Relation Age of Onset    Alcohol Abuse Mother     Cirrhosis Mother        Review of Systems:     Review of Systems    Physical Exam:   BP (!) 169/66   Pulse 88   Temp 98.6 °F (37 °C) (Axillary)   Resp 16   Wt 168 lb (76.2 kg)   SpO2 98%   BMI 27.96 kg/m²   Temp (24hrs), Av.7 °F (37.1 °C), Min:98.2 °F (36.8 °C), Max:99.2 °F (37.3 °C)    Recent Labs     22  0405   POCGLU 173*       Intake/Output Summary (Last 24 hours) at 2022 4832  Last data filed at 2022 2200  Gross per 24 hour   Intake --   Output 200 ml   Net -200 ml         Neurologic Exam     GENERAL  Appears uncomfortable and in distress   HEENT  NC/ AT   HEART  S1 and S2 heard; palpation of pulses: radial pulse    NECK  Supple and no bruits heard   MENTAL STATUS:  Alert, NOT oriented, confusion, aphasic but no disarthria, no hallucination or delusion   CRANIAL NERVES: II     -      Visual fields intact to confrontation  III,IV,VI -  PERR, EOMs full, no ptosis  V     -     Normal facial sensation   VII    -     Normal facial symmetry  VIII   -     Intact hearing   IX,X -     Symmetrical palate  XI    -     Symmetrical shoulder shrug  XII   -     Midline tongue, no atrophy    MOTOR FUNCTION: RUE: Significant for good strength of grade 5/5 in proximal and distal muscle groups   LUE: Significant for good strength of grade 5/5 in proximal and distal muscle groups   RLE: Significant for good strength of grade 5/5 in proximal and distal muscle groups   LLE: Significant for good strength of grade 5/5 in proximal and distal muscle groups      Normal bulk, normal tone and no involuntary movements, no tremor   SENSORY FUNCTION:  Unable to access due to patient AMS   CEREBELLAR FUNCTION:  Unable to access due to patient AMS   REFLEX FUNCTION:  Unable to access due to patient AMS   STATION and GAIT  Unable to access due to patient AMS       Investigations:      Laboratory Testing:  Recent Results (from the past 24 hour(s))   Troponin    Collection Time: 02/21/22 10:30 AM   Result Value Ref Range    Troponin, High Sensitivity 14 0 - 14 ng/L    Troponin T NOT REPORTED <0.03 ng/mL    Troponin Interp NOT REPORTED    POC Glucose Fingerstick    Collection Time: 02/22/22  4:05 AM   Result Value Ref Range    POC Glucose 173 (H) 65 - 105 mg/dL   Basic Metabolic Panel w/ Reflex to MG    Collection Time: 02/22/22  6:37 AM   Result Value Ref Range    Glucose 103 (H) 70 - 99 mg/dL    BUN 8 6 - 20 mg/dL    CREATININE 0.35 (L) 0.50 - 0.90 mg/dL    Calcium 9.1 8.6 - 10.4 mg/dL    Sodium 137 135 - 144 mmol/L    Potassium 3.6 (L) 3.7 - 5.3 mmol/L    Chloride 100 98 - 107 mmol/L    CO2 23 20 - 31 mmol/L    Anion Gap 14 9 - 17 mmol/L    GFR Non-African American >60 >60 mL/min    GFR African American >60 >60 mL/min    GFR Comment         CBC with Auto Differential    Collection Time: 02/22/22  6:37 AM   Result Value Ref Range    WBC 14.2 (H) 3.5 - 11.3 k/uL    RBC 3.88 (L) 3.95 - 5.11 m/uL    Hemoglobin 11.2 (L) 11.9 - 15.1 g/dL    Hematocrit 38.7 36.3 - 47.1 %    MCV 99.7 82.6 - 102.9 fL    MCH 28.9 25.2 - 33.5 pg    MCHC 28.9 28.4 - 34.8 g/dL    RDW 17.3 (H) 11.8 - 14.4 %    Platelets 386 (H) 043 - 453 k/uL    MPV 9.6 8.1 - 13.5 fL    NRBC Automated 0.0 0.0 per 100 WBC    RBC Morphology ANISOCYTOSIS PRESENT     Seg Neutrophils 91 (H) 36 - 65 %    Lymphocytes 7 (L) 24 - 43 %    Monocytes 1 (L) 3 - 12 %    Eosinophils % 0 (L) 1 - 4 %    Basophils 1 0 - 2 %    Immature Granulocytes 1 (H) 0 %    Segs Absolute 13.01 (H) 1.50 - 8.10 k/uL    Absolute Lymph # 0.92 (L) 1.10 - 3.70 k/uL    Absolute Mono # 0.15 0.10 - 1.20 k/uL    Absolute Eos # <0.03 0.00 - 0.44 k/uL    Basophils Absolute 0.07 0.00 - 0.20 k/uL    Absolute Immature Granulocyte 0.07 0.00 - 0.30 k/uL   Magnesium    Collection Time: 02/22/22  6:37 AM   Result Value Ref Range    Magnesium 1.7 1.6 - 2.6 mg/dL     Recent Labs     02/22/22  0637   WBC 14.2*   RBC 3.88*   HGB 11.2*   HCT 38.7   MCV 99.7   MCH 28.9   MCHC 28.9   RDW 17.3*   *   MPV 9.6     Recent Labs     02/20/22  1026 02/21/22  0610 02/22/22  0637      < > 137   K 3.4*   < > 3.6*      < > 100   CO2 22   < > 23   BUN 13   < > 8   CREATININE 0.47*   < > 0.35*   GLUCOSE 150*   < > 103*   CALCIUM 9.0   < > 9.1   PROT 6.7  --   --    LABALBU 4.2  --   --    BILITOT 0.16*  --   --    ALKPHOS 65  --   --    AST 12  --   --    ALT 7  --   --     < > = values in this interval not displayed.      Hemoglobin A1C   Date Value Ref Range Status   03/10/2021 6.0 4.0 - 6.0 % Final       Assessment :      Primary Problem  Dehydration    Active Hospital Problems    Diagnosis Date Noted    Lactic acidosis [E87.2] 02/21/2022    Ileus ([address] [K56.7] 02/21/2022    Elevated troponin [R77.8] 02/21/2022    Pericardial effusion [I31.3] 02/21/2022    Hypokalemia [E87.6] 02/21/2022    Acute respiratory failure with hypoxia (HCC) [J96.01] 02/21/2022    Dehydration [E86.0] 02/20/2022    AMS (altered mental status) [R41.82] 02/20/2022    Ovarian cancer ([facility] [C56.9] 05/29/2021    Chronic deep vein thrombosis (DVT) of femoral vein of left lower extremity ([facility] [I82.512] 03/11/2021    Fatigue [R53.83]     Cancer, metastatic to bone [facility]) [C79.51] 01/28/2021    Type 2 diabetes mellitus without complication, without long-term current use of insulin ([facility] [E11.9]     Seizure ([facility] [R56.9] 10/21/2020       Patient is a 60 y.o. Non- / non  female  with a PMH of ovarian cancer with metastasis post chemotherapy/radiotherapy couple of days ago presented with N/V, chills, fatigue and altered mental status.  Patient's urine culture was positive for E coli, currently on Unasyn. Patient admitted to inpatient status for further management. Patient had seizure O/N and was loaded with 2mg Ativan and 1g Keppra. Lamictal was held yesterday as patient not tolerating oral feeds. MRI Brain and EEG to be done today.       Plan:     1. Switch Lamictal from 75mg to 100mg BID and extra 25mg Keppra tonight. Then continue Keppra 1500 mg BID  2. Continue Unasyn for ongoing UTI  3. MRI Brain W WO Contrast scheduled for today, patient to be given Ativan for agitation  4. EEG done today with signal abnormalities. LTME to be started today  5. Tylenol or toradol 15mg PRN for pain  6. Neuro checks  7. Neurology will continue following patient    Follow-up further recommendations after discussing the case with attending  The plan was discussed with the patient, patient's family and the medical staff. Consultations:   IP CONSULT TO ONCOLOGY  IP CONSULT TO INTERNAL MEDICINE  IP CONSULT TO CASE MANAGEMENT  IP CONSULT TO NEUROLOGY  IP CONSULT TO INTERVENTIONAL RADIOLOGY    Patient is admitted as inpatient status because of co-morbidities listed above, severity of signs and symptoms as outlined, requirement for current medical therapies and most importantly because of direct risk to patient if care not provided in a hospital setting.     Genaro Bernal  2/22/2022  9:23 AM    Copy sent to Dr. Faby Michele MD

## 2022-02-22 NOTE — CARE COORDINATION
Responded to  Rapid Response as there was a concurrent rapid response for this care team assigned to this patient. Per nursing report the patient screamed out tensed up and started to have convulsions involving all 4 extremities    Upon my arrival, staff at beside, attempting vitals. Airway patient, assisted to side lying position     2 mg IV ativan given 0348 with cessation of the seizure like activity. I did perfect serve Neurology resident to alert him of the seizure activity and current progress of ativan. At 0373 Neuro ICU to bedside, updated by nursing at 350 hand off given to Dr Stefanie Cooks - departed patient's room.        Jimenez Aragon DNP, ACAG-NP, FNP

## 2022-02-22 NOTE — PLAN OF CARE
Overnight: At 3am, RN contacted for patient being confused mildly agitated. On arrival, patient was sitting in the bed spontaneously moving his right upper and lower extremity. Disoriented. Advised to resume Lamictal and follow-up MRI brain in the morning. At 3:30 AM, rapid response was called at patient's pain and had generalized stiffening with convulsions in all 4 extremities. 2 mg Ativan given resulting in cessation of seizure activity. Keppra 1 g added once. Patient is currently on 1500 mg twice daily Keppra. Plan to perform MRI brain with and without contrast in the morning.

## 2022-02-23 ENCOUNTER — APPOINTMENT (OUTPATIENT)
Dept: MRI IMAGING | Age: 57
DRG: 388 | End: 2022-02-23
Payer: COMMERCIAL

## 2022-02-23 LAB
ABSOLUTE EOS #: <0.03 K/UL (ref 0–0.44)
ABSOLUTE IMMATURE GRANULOCYTE: 0.03 K/UL (ref 0–0.3)
ABSOLUTE LYMPH #: 1.04 K/UL (ref 1.1–3.7)
ABSOLUTE MONO #: 0.16 K/UL (ref 0.1–1.2)
ANION GAP SERPL CALCULATED.3IONS-SCNC: 14 MMOL/L (ref 9–17)
BASOPHILS # BLD: 1 % (ref 0–2)
BASOPHILS ABSOLUTE: 0.08 K/UL (ref 0–0.2)
BUN BLDV-MCNC: 7 MG/DL (ref 6–20)
CALCIUM SERPL-MCNC: 8.5 MG/DL (ref 8.6–10.4)
CHLORIDE BLD-SCNC: 103 MMOL/L (ref 98–107)
CO2: 21 MMOL/L (ref 20–31)
CREAT SERPL-MCNC: 0.33 MG/DL (ref 0.5–0.9)
EOSINOPHILS RELATIVE PERCENT: 0 % (ref 1–4)
GFR AFRICAN AMERICAN: >60 ML/MIN
GFR NON-AFRICAN AMERICAN: >60 ML/MIN
GFR SERPL CREATININE-BSD FRML MDRD: ABNORMAL ML/MIN/{1.73_M2}
GLUCOSE BLD-MCNC: 89 MG/DL (ref 70–99)
HCT VFR BLD CALC: 34.2 % (ref 36.3–47.1)
HEMOGLOBIN: 10.1 G/DL (ref 11.9–15.1)
IMMATURE GRANULOCYTES: 0 %
LAMOTRIGINE LEVEL: 1.5 UG/ML (ref 3–15)
LYMPHOCYTES # BLD: 12 % (ref 24–43)
MAGNESIUM: 1.9 MG/DL (ref 1.6–2.6)
MAGNESIUM: 1.9 MG/DL (ref 1.6–2.6)
MCH RBC QN AUTO: 28.5 PG (ref 25.2–33.5)
MCHC RBC AUTO-ENTMCNC: 29.5 G/DL (ref 28.4–34.8)
MCV RBC AUTO: 96.6 FL (ref 82.6–102.9)
MONOCYTES # BLD: 2 % (ref 3–12)
NRBC AUTOMATED: 0 PER 100 WBC
PDW BLD-RTO: 17 % (ref 11.8–14.4)
PLATELET # BLD: 493 K/UL (ref 138–453)
PMV BLD AUTO: 9.6 FL (ref 8.1–13.5)
POTASSIUM SERPL-SCNC: 3.4 MMOL/L (ref 3.7–5.3)
POTASSIUM SERPL-SCNC: 3.9 MMOL/L (ref 3.7–5.3)
RBC # BLD: 3.54 M/UL (ref 3.95–5.11)
RBC # BLD: ABNORMAL 10*6/UL
SEG NEUTROPHILS: 85 % (ref 36–65)
SEGMENTED NEUTROPHILS ABSOLUTE COUNT: 7.47 K/UL (ref 1.5–8.1)
SODIUM BLD-SCNC: 138 MMOL/L (ref 135–144)
WBC # BLD: 8.8 K/UL (ref 3.5–11.3)

## 2022-02-23 PROCEDURE — 1200000000 HC SEMI PRIVATE

## 2022-02-23 PROCEDURE — 85025 COMPLETE CBC W/AUTO DIFF WBC: CPT

## 2022-02-23 PROCEDURE — 95720 EEG PHY/QHP EA INCR W/VEEG: CPT | Performed by: PSYCHIATRY & NEUROLOGY

## 2022-02-23 PROCEDURE — 6360000002 HC RX W HCPCS: Performed by: STUDENT IN AN ORGANIZED HEALTH CARE EDUCATION/TRAINING PROGRAM

## 2022-02-23 PROCEDURE — 36415 COLL VENOUS BLD VENIPUNCTURE: CPT

## 2022-02-23 PROCEDURE — 2500000003 HC RX 250 WO HCPCS: Performed by: STUDENT IN AN ORGANIZED HEALTH CARE EDUCATION/TRAINING PROGRAM

## 2022-02-23 PROCEDURE — 99232 SBSQ HOSP IP/OBS MODERATE 35: CPT | Performed by: INTERNAL MEDICINE

## 2022-02-23 PROCEDURE — 6370000000 HC RX 637 (ALT 250 FOR IP): Performed by: STUDENT IN AN ORGANIZED HEALTH CARE EDUCATION/TRAINING PROGRAM

## 2022-02-23 PROCEDURE — 99232 SBSQ HOSP IP/OBS MODERATE 35: CPT | Performed by: PSYCHIATRY & NEUROLOGY

## 2022-02-23 PROCEDURE — C9113 INJ PANTOPRAZOLE SODIUM, VIA: HCPCS | Performed by: STUDENT IN AN ORGANIZED HEALTH CARE EDUCATION/TRAINING PROGRAM

## 2022-02-23 PROCEDURE — 84132 ASSAY OF SERUM POTASSIUM: CPT

## 2022-02-23 PROCEDURE — 83735 ASSAY OF MAGNESIUM: CPT

## 2022-02-23 PROCEDURE — 2580000003 HC RX 258: Performed by: STUDENT IN AN ORGANIZED HEALTH CARE EDUCATION/TRAINING PROGRAM

## 2022-02-23 PROCEDURE — 80175 DRUG SCREEN QUAN LAMOTRIGINE: CPT

## 2022-02-23 PROCEDURE — 80048 BASIC METABOLIC PNL TOTAL CA: CPT

## 2022-02-23 PROCEDURE — 95711 VEEG 2-12 HR UNMONITORED: CPT

## 2022-02-23 RX ORDER — OLANZAPINE 10 MG/1
5 INJECTION, POWDER, LYOPHILIZED, FOR SOLUTION INTRAMUSCULAR ONCE
Status: COMPLETED | OUTPATIENT
Start: 2022-02-23 | End: 2022-02-23

## 2022-02-23 RX ORDER — LORAZEPAM 2 MG/ML
2 INJECTION INTRAMUSCULAR ONCE
Status: DISCONTINUED | OUTPATIENT
Start: 2022-02-23 | End: 2022-02-23

## 2022-02-23 RX ORDER — MAGNESIUM SULFATE IN WATER 40 MG/ML
2000 INJECTION, SOLUTION INTRAVENOUS ONCE
Status: COMPLETED | OUTPATIENT
Start: 2022-02-23 | End: 2022-02-23

## 2022-02-23 RX ORDER — POTASSIUM CHLORIDE 7.45 MG/ML
10 INJECTION INTRAVENOUS
Status: COMPLETED | OUTPATIENT
Start: 2022-02-23 | End: 2022-02-23

## 2022-02-23 RX ORDER — HYDRALAZINE HYDROCHLORIDE 20 MG/ML
10 INJECTION INTRAMUSCULAR; INTRAVENOUS EVERY 6 HOURS PRN
Status: DISCONTINUED | OUTPATIENT
Start: 2022-02-23 | End: 2022-02-28 | Stop reason: HOSPADM

## 2022-02-23 RX ORDER — LORAZEPAM 2 MG/ML
0.5 INJECTION INTRAMUSCULAR ONCE
Status: COMPLETED | OUTPATIENT
Start: 2022-02-23 | End: 2022-02-23

## 2022-02-23 RX ORDER — LABETALOL HYDROCHLORIDE 5 MG/ML
10 INJECTION, SOLUTION INTRAVENOUS EVERY 6 HOURS PRN
Status: DISCONTINUED | OUTPATIENT
Start: 2022-02-23 | End: 2022-02-28 | Stop reason: HOSPADM

## 2022-02-23 RX ORDER — LORAZEPAM 0.5 MG/1
0.5 TABLET ORAL EVERY 4 HOURS PRN
Status: DISCONTINUED | OUTPATIENT
Start: 2022-02-23 | End: 2022-02-27

## 2022-02-23 RX ORDER — HALOPERIDOL 5 MG/ML
2 INJECTION INTRAMUSCULAR ONCE
Status: COMPLETED | OUTPATIENT
Start: 2022-02-23 | End: 2022-02-23

## 2022-02-23 RX ADMIN — POTASSIUM CHLORIDE 10 MEQ: 10 INJECTION, SOLUTION INTRAVENOUS at 12:41

## 2022-02-23 RX ADMIN — OLANZAPINE 5 MG: 10 INJECTION, POWDER, FOR SOLUTION INTRAMUSCULAR at 12:49

## 2022-02-23 RX ADMIN — WATER 2.1 ML: 1 INJECTION INTRAMUSCULAR; INTRAVENOUS; SUBCUTANEOUS at 13:00

## 2022-02-23 RX ADMIN — POTASSIUM CHLORIDE 10 MEQ: 10 INJECTION, SOLUTION INTRAVENOUS at 14:34

## 2022-02-23 RX ADMIN — POTASSIUM CHLORIDE 10 MEQ: 10 INJECTION, SOLUTION INTRAVENOUS at 09:05

## 2022-02-23 RX ADMIN — SODIUM CHLORIDE: 9 INJECTION, SOLUTION INTRAVENOUS at 09:04

## 2022-02-23 RX ADMIN — POTASSIUM CHLORIDE 10 MEQ: 10 INJECTION, SOLUTION INTRAVENOUS at 13:35

## 2022-02-23 RX ADMIN — AMPICILLIN AND SULBACTAM 3000 MG: 1; 2 INJECTION, POWDER, FOR SOLUTION INTRAMUSCULAR; INTRAVENOUS at 01:05

## 2022-02-23 RX ADMIN — ENOXAPARIN SODIUM 40 MG: 100 INJECTION SUBCUTANEOUS at 09:00

## 2022-02-23 RX ADMIN — SODIUM CHLORIDE: 9 INJECTION, SOLUTION INTRAVENOUS at 01:50

## 2022-02-23 RX ADMIN — WATER 2.1 ML: 1 INJECTION INTRAMUSCULAR; INTRAVENOUS; SUBCUTANEOUS at 20:40

## 2022-02-23 RX ADMIN — MAGNESIUM SULFATE 2000 MG: 2 INJECTION INTRAVENOUS at 09:48

## 2022-02-23 RX ADMIN — HALOPERIDOL LACTATE 2 MG: 5 INJECTION, SOLUTION INTRAMUSCULAR at 15:46

## 2022-02-23 RX ADMIN — LAMOTRIGINE 100 MG: 100 TABLET ORAL at 08:59

## 2022-02-23 RX ADMIN — AMPICILLIN AND SULBACTAM 3000 MG: 1; 2 INJECTION, POWDER, FOR SOLUTION INTRAMUSCULAR; INTRAVENOUS at 06:44

## 2022-02-23 RX ADMIN — PANTOPRAZOLE SODIUM 40 MG: 40 INJECTION, POWDER, FOR SOLUTION INTRAVENOUS at 09:00

## 2022-02-23 RX ADMIN — POTASSIUM CHLORIDE 10 MEQ: 10 INJECTION, SOLUTION INTRAVENOUS at 15:48

## 2022-02-23 RX ADMIN — OLANZAPINE 5 MG: 10 INJECTION, POWDER, LYOPHILIZED, FOR SOLUTION INTRAMUSCULAR at 20:40

## 2022-02-23 RX ADMIN — LEVETIRACETAM 1500 MG: 15 INJECTION INTRAVENOUS at 11:11

## 2022-02-23 RX ADMIN — LORAZEPAM 0.5 MG: 2 INJECTION INTRAMUSCULAR at 18:16

## 2022-02-23 RX ADMIN — POTASSIUM CHLORIDE 10 MEQ: 10 INJECTION, SOLUTION INTRAVENOUS at 11:11

## 2022-02-23 RX ADMIN — POTASSIUM CHLORIDE 10 MEQ: 10 INJECTION, SOLUTION INTRAVENOUS at 10:12

## 2022-02-23 RX ADMIN — AMPICILLIN AND SULBACTAM 3000 MG: 1; 2 INJECTION, POWDER, FOR SOLUTION INTRAMUSCULAR; INTRAVENOUS at 19:34

## 2022-02-23 RX ADMIN — AMPICILLIN AND SULBACTAM 3000 MG: 1; 2 INJECTION, POWDER, FOR SOLUTION INTRAMUSCULAR; INTRAVENOUS at 12:31

## 2022-02-23 RX ADMIN — POTASSIUM CHLORIDE 10 MEQ: 10 INJECTION, SOLUTION INTRAVENOUS at 17:02

## 2022-02-23 RX ADMIN — LORAZEPAM 0.5 MG: 0.5 TABLET ORAL at 15:13

## 2022-02-23 RX ADMIN — SODIUM CHLORIDE, PRESERVATIVE FREE 10 ML: 5 INJECTION INTRAVENOUS at 09:00

## 2022-02-23 ASSESSMENT — PAIN SCALES - GENERAL: PAINLEVEL_OUTOF10: 0

## 2022-02-23 NOTE — PROGRESS NOTES
[facility]  Internal Medicine Teaching Residency Program  Inpatient Daily Progress Note  ______________________________________________________________________________    Patient: Keon Arredondo [facility]  YOB: 1965   OOW:[id number]    Acct: [de-identified]     Room: 90/5620-33  Admit date: 2/20/2022  Today's date: 02/23/22  Number of days in the hospital: 3    SUBJECTIVE   Admitting Diagnosis: Dehydration  CC: Altered mental status, Nausea and vomiting. Pt seen and examined. Patient is currently on LTM EEG and overnight patient was waking and screaming. Apparently patient is having auditory hallucinations. Sitter at bedside. No seizure event recorder. Patient had a temperature of 100.4F for which Tylenol was given. Patient has elevated blood pressure with /60 mmHg for which she is on labetalol and hydralazine. Patient is saturating on room air. Had seizure day before yesterday- MRI brain with and without contrast today per neuro. ROS:  Unable to obtain due to patient's mentation. BRIEF HISTORY     The patient is a 49-year-old female with significant past medical history of ovarian cancer with metastasis s/p chemo and radiotherapy, seizure disorder on Lamictal and Keppra who presents with altered mental status, nausea and vomiting. 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 She had surgical debulking and systemic treatment with Taxol and carboplatin for 6 cycles. She was in remission for about 2 years. She had a relapse in 2007 and was treated with topotecan with good results. Patient relapsed again in 2008 and was treated with Taxol and carboplatin. After 3 cycles of systemic chemotherapy she had problems with carboplatin so she finished 3 more cycles with Taxol.   She did well again for 2 to 3 years and then she had a relapse in 2012 and had intraperitoneal chemotherapy treatment with Taxol. Patient was last seen by her oncologist in 2014 at which time she had relatively stable disease with normal tumor marker and no significant abnormal images. Patient moved to [place] after that and she was lost for oncology follow-ups. Patient states that she came back to [facility] 3 years ago and has been following with an oncologist since. Patient states her last chemotherapy was last week on February 18 2, 2022. She received Gemzar for chemotherapy and nausea, vomiting, diarrhea, mouth sores, drowsiness, muscle aches are some of the known side effects of Gemzar. Reimaging confirmed metastatic relapse and biopsy confirmed ovarian cancer recurrence. Scan showed extensive intra-abdominal and splenic involvement and lung metastasis. CT brain was unremarkable on 2/20/2022. Patient currently has extensive intra-abdominal, splenic and lung involvement from ovarian cancer. Patient with recent intra-abdominal bleeding due to splenic mass with GI infiltration s/p embolization. She is currently on palliative treatment with Doxil. Patient recently had a DVT last week for which she was started on Eliquis. Patient's last chemotherapy was on 2/18/2022 when she received Gemzar for chemotherapy       PMH/Home meds:  Metastatic ovarian cancer with mets to liver, spleen and lungs - palliative chemotherapy  Seizure disorder-Lamictal and Keppra  Type 2 diabetes  DVT on Eliquis  Depression  Anemia on iron supplements  Protonix  Percocet for pain     ER Course  On arrival patient afebrile, hypertensive with blood pressure between 172U to 886A systolic, on 2 L of oxygen via nasal cannula. Patient does not use any home oxygen.   Patient's lab at time of admission showed sodium 142, potassium 3.4, bicarb 22, creatinine 0.47, glucose 150, lactic acid 2.8 which improved to 1.5  Patient's proBNP 328, troponins 51-57-17  LFTs normal  Blood glucose 115  Prolactin level 4.52  Keppra levels within normal limits  Lamictal levels 1.3  Patient had leukocytosis 20, hemoglobin 11.9 stable, platelets 729. Blood cultures and urine cultures were sent  UA unremarkable with 2+ glucosuria but no leukocyte esterase or bacteriuria or WBCs. CT chest for PE was negative, no concerns for pneumonia, showed pericardial effusion which was present on previous CTs as well  CT head was negative  X-ray chest was unremarkable  Abdominal x-ray showed ileus  EKG was unremarkable with 467 QTC  Patient received 1 L fluid bolus in the ER and started on fluids. Patient was transferred to medicine service for further management. OBJECTIVE     Vital Signs:  BP (!) 157/60   Pulse 52   Temp 98.6 °F (37 °C) (Oral)   Resp 16   Wt 168 lb (76.2 kg)   SpO2 94%   BMI 27.96 kg/m²     Temp (24hrs), Av.1 °F (37.3 °C), Min:98.3 °F (36.8 °C), Max:100.4 °F (38 °C)    In: -   Out: 950 [Urine:950]    Physical Exam:  General:  Awake but confused  HEENT: Atraumatic, normocephalic, no throat congestion, moist mucosa. Eyes:   PERRLA  Neck:   Supple  Chest:   Clear on auscultation. no rales or ronchi  Cardiac:  S1 S2 RR, no gallops, murmur or rubs. Abdomen: Soft, non-tender, no masses or organomegaly, BS present. :   No suprapubic or flank tenderness. Neuro:  Unable to assess, no focal neurological deficit  SKIN:  No rashes, good skin turgor. Extremities:  No edema.     Medications:  Scheduled Medications:    potassium chloride  10 mEq IntraVENous Q1H    lamoTRIgine  100 mg Oral BID    enoxaparin  40 mg SubCUTAneous BID    ampicillin-sulbactam  3,000 mg IntraVENous Q6H    sodium chloride flush  5-40 mL IntraVENous 2 times per day    sodium chloride flush  5-40 mL IntraVENous 2 times per day    levetiracetam  1,500 mg IntraVENous Q12H    pantoprazole  40 mg IntraVENous Daily    And    sodium chloride (PF)  10 mL IntraVENous Daily     Continuous Infusions:    sodium chloride      sodium chloride      sodium chloride 75 mL/hr at 22 0904     PRN Medicationslabetalol, 10 mg, Q6H PRN   Or  hydrALAZINE, 10 mg, Q6H PRN  bisacodyl, 10 mg, Daily PRN  albuterol, 2.5 mg, As Directed RT PRN  sodium chloride flush, 5-40 mL, PRN  sodium chloride, 25 mL, PRN  sodium chloride flush, 5-40 mL, PRN  sodium chloride, 25 mL, PRN  acetaminophen, 650 mg, Q6H PRN   Or  acetaminophen, 650 mg, Q6H PRN  potassium chloride, 40 mEq, PRN   Or  potassium alternative oral replacement, 40 mEq, PRN   Or  potassium chloride, 10 mEq, PRN  morphine, 1 mg, Q4H PRN  promethazine, 12.5 mg, Q6H PRN   Or  ondansetron, 4 mg, Q6H PRN        Diagnostic Labs:  CBC:   Recent Labs     02/21/22  0610 02/22/22  0637 02/23/22  0541   WBC 14.1* 14.2* 8.8   RBC 3.56* 3.88* 3.54*   HGB 10.5* 11.2* 10.1*   HCT 33.7* 38.7 34.2*   MCV 94.7 99.7 96.6   RDW 17.4* 17.3* 17.0*   * 551* 493*     BMP:   Recent Labs     02/21/22  0610 02/22/22  0637 02/23/22  0541    137 138   K 3.6* 3.6* 3.4*    100 103   CO2 22 23 21   BUN 8 8 7   CREATININE 0.39* 0.35* 0.33*     BNP: No results for input(s): BNP in the last 72 hours. PT/INR: No results for input(s): PROTIME, INR in the last 72 hours. APTT: No results for input(s): APTT in the last 72 hours. CARDIAC ENZYMES: No results for input(s): CKMB, CKMBINDEX, TROPONINI in the last 72 hours. Invalid input(s): CKTOTAL;3  FASTING LIPID PANEL:  Lab Results   Component Value Date    CHOL 131 03/10/2021    HDL 33 (L) 03/10/2021    TRIG 147 03/10/2021     LIVER PROFILE:   No results for input(s): AST, ALT, ALB, BILIDIR, BILITOT, ALKPHOS in the last 72 hours. MICROBIOLOGY:   Lab Results   Component Value Date/Time    CULTURE NO GROWTH 3 DAYS 02/20/2022 11:20 AM       Imaging:    XR ABDOMEN (KUB) (SINGLE AP VIEW)    Result Date: 2/20/2022  Ileus. Probable gallstone. Catheter left abdomen and pelvis requires clinical correlation. CT Head WO Contrast    Result Date: 2/20/2022  No acute intracranial abnormality.      XR CHEST PORTABLE    Result Date: 2/20/2022  Chronic findings in the chest without acute airspace disease identified. CT CHEST PULMONARY EMBOLISM W CONTRAST    Result Date: 2/20/2022  1. No evidence for acute pulmonary embolism. 2.  Bilateral pulmonary nodules, thoracic lymphadenopathy, soft tissue calcifications in the chest and upper abdomen and sclerotic bone lesions again demonstrated, as described previously, which may be related to the patient's history of malignancy. 3.  Small pericardial effusion. Trace left pleural effusion. 4.  Mild septal thickening suggestive of interstitial edema. CT abdomen from 2/22/2022  Impression   1. Disease progression with increased size of bilateral pulmonary metastases,   slightly increased size of right retrocrural and left inguinal nannette   metastases, and possible slight increase in size of peritoneal metastases. Abdominal and pelvic nannette metastases and skeletal metastases appear   unchanged. 2. Persistent trace bilateral pleural effusions and small pericardial   effusion, likely malignant in etiology. 3. Worsened stricturing of the mid sigmoid colon with no obstruction at this   time.  This may be a sequela of prior treatment to the area.    4. The gastric tube has been retracted with tip now at the gastroesophageal   junction and the sideport not included.  Recommend advancement             ASSESSMENT & PLAN     ASSESSMENT / PLAN:     Principal Problem:    Dehydration  Active Problems:    Seizure ([address]    Type 2 diabetes mellitus without complication, without long-term current use of insulin (HCC)    Encephalopathy acute    Cancer, metastatic to bone (HCC)    Fatigue    Chronic deep vein thrombosis (DVT) of femoral vein of left lower extremity (HCC)    Ovarian cancer (HCC)    AMS (altered mental status)    Lactic acidosis    Ileus (HCC)    Elevated troponin    Pericardial effusion    Hypokalemia    Acute respiratory failure with hypoxia (HCC)    Acute metabolic encephalopathy    Confusion Urinary tract infection without hematuria    Seizure disorder (HCC)    Breakthrough seizure ([facility]  Resolved Problems:    * No resolved hospital problems. *    IMPRESSION  This is a 60 y.o. female with a PMH of ovarian cancer with metastasis post chemotherapy/radiotherapy couple of days ago  presented with N/V, chills, fatigue, weakess and altered mental status. Patient also has elevated troponins for which cardiology is on board and new leukocytosis for which hematology is following. Patient admitted to inpatient status for further managament. 1. Dehydration:  Likely secondary to emesis. Continue 0.9% NACL infusion at 75 ml/hr. 2.  Ileus:   Keep NPO with IVF dehydration along with NG tube. Please advance NG tube. Potassium replaced. 3. Seizures:   Resume her home Lamictal and Keppra. EEG ordered. Neurology following. Currently on LTM EEG. No seizures recorded. MRI brain pending. Change in mentation likely due to being postictal.  We will continue to monitor. 4. Type 2 diabetes mellitus without complication, without long-term current use of insulin ([facility])    Currently blood glucose under acceptable levels. We will continue to monitor. 5. Leukocytosis:   Was likely secondary to UTI for which patient is on Unasyn. WBC count trended down. 6 . Elevated blood pressure  Likely due to pain and emesis. On IV labetalol and IV hydralazine every 6 hours as needed. 7.  Hypokalemia:   Likely cause of ileus. We will replace potassium. 8.  Acute respiratory failure with hypoxia with unclear etiology:   Likely secondary to pulmonary metastasis. She is currently saturating well above 92% on room air. Will continue to monitor. 9.  Lactic acidosis- resolved. 10.  Chronic DVT of femoral vein of lower extremity:   Full dose lovenox as patient is NPO  Will resume eliquis once tolerating oral feeds.     11.  Recurrent ovarian cancer with extensive metastasis  Chemotherapy using gemcitabine with stable CA-125 and with side effects abdominal pain nausea and vomiting. Heme-onc on board. Appreciate recommendations. Follow-up MRI brain to rule out metastasis to the brain.     DVT ppx: already on full dose lovenox  GI ppx: Protonix     PT/OT/SW: Consulted  Discharge Planning: Case management consulted. Cheli Marlow MD  PGY-1, Internal Medicine Resident  KOK[facility] [address] 21 2/23/2022, 1:49 PM   Attending Physician Statement  I have discussed the care of Joe Cat, including pertinent history and exam findings,  with the resident. I have seen and examined the patient and the key elements of all parts of the encounter have been performed by me. I agree with the assessment, plan and orders as documented by the resident with additions . Treatment plan Discussed with nursing staff in detail , all questions answered . Electronically signed by Gualberto Cooper MD on   2/23/22 at 9:21 PM EST    Please note that this chart was generated using voice recognition Dragon dictation software. Although every effort was made to ensure the accuracy of this automated transcription, some errors in transcription may have occurred.

## 2022-02-23 NOTE — PLAN OF CARE
Attempted to scan patient for 2nd time with ativan and immobilization devices utilized. - No change. Patient still moving. RN notified prior to patient returning to the floor.

## 2022-02-23 NOTE — PROGRESS NOTES
Physical Therapy        Physical Therapy Cancel Note      DATE: 2022    NAME: [address] [place] [address]  MRN: [id number]   : 1965      Patient not seen this date for Physical Therapy due to: Other: upon arrival to room, pt yelling out, sitter present and attempting to calm/redirect pt. Not appropriate for PT evaluation at this time. PT will check back as time allows.       Electronically signed by Madeleine Martínez PT on 2022 at 9:35 AM

## 2022-02-23 NOTE — CARE COORDINATION
Case Management Initial Discharge Plan  [facility],             Met with:patient and daughter to discuss discharge plans. Information verified: address, contacts, phone number, , insurance Yes  Insurance Provider: Rupinder Kwong    Emergency Contact/Next of Kin name & number: Ignacio Mcdaniel [phone]  Who are involved in patient's support system? Daughter    PCP: Mel Oakes MD  Date of last visit: 22      Discharge Planning    Living Arrangements:   Lives with son     Home has 1 stories    Location of bedroom/bathroom in home main    Patient able to perform ADL's:Independent    Current Services (outpatient & in home) None   DME equipment: None  DME provider: None    Is patient receiving oral anticoagulation therapy? Yes, Eliquis    If indicated:   Physician managing anticoagulation treatment: daughter was not sure, possibly Dr. Gail Emmanuel  Where does patient obtain lab work for ATC treatment? n/a      Potential Assistance Needed:   Home care, list provided    Patient agreeable to home care: Yes  Freedom of choice provided:  yes    Prior SNF/Rehab Placement and Facility: None  Agreeable to SNF/Rehab: No, daughter stated this has happened before post seizure and her mom always goes back to \"normal\" where she is able to do everything for herself and is not confused. She fells she will get better after a rest in the hospital and her mom would be upset at being put in a nursing home even for a short time. Freedom of choice provided: yes     Evaluation: no    Expected Discharge date:       Patient expects to be discharged to:   Home with home care    If home: is the family and/or caregiver wiling & able to provide support at home? yes  Who will be providing this support?  Daughter when she is not working    Follow Up Appointment: Best Day/ Time:      Transportation provider: Daughter  Transportation arrangements needed for discharge: No    Readmission Risk Risk of Unplanned Readmission:  30             Does patient have a readmission risk score greater than 14?: Yes  If yes, follow-up appointment must be made within 7 days of discharge.      Goals of Care: Safe discharge plan,   Educated patient on transitional options, provided freedom of choice      Educated patient on transitional options, provided freedom of choice and are agreeable with plan      Discharge Plan: Home with homecare, daughter to transport          Electronically signed by Latricia Amador RN on 2/23/22 at 3:10 PM EST

## 2022-02-23 NOTE — PROGRESS NOTES
[facility]   [facility]    Progress Note             Date:   2/23/2022  Patient name:  Johanna Fitch  Date of admission:  2/20/2022  9:53 AM  MRN:   [id number]  Account:  [de-identified]  YOB: 1965  PCP:    Abel Pfeiffer MD  Room:   9976/4957-31  Code Status:    Full Code    Chief Complaint:     Chief Complaint   Patient presents with    Altered Mental Status    Nausea & Vomiting     Interval hx: The patient was seen and examined at bedside. Is vitally stable, alert and oriented x 0. Patient when woke up stated stop the noise while there was no noise . Auditory hallucinations . Sitter at bedside  No event or seizure recorded   Patient on keppra 1500 bid , lamictal 100 bid       Brief History of Present Illness:      The patient is a 60 y.o. female with significant past medical history of ovarian cancer with metastasis s/p chemo and radiation therapy, seizure disorder on Lamictal and Keppra who presents with nausea, vomiting and AMS.      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 Constantine Balbuena had surgical debulking and she had systemic treatment with Taxol and carboplatin for 6 cycles.  Patient was in remission for about 2 years. Constantine Balbuena had a relapse in 2007 and treated with topotecan with good results.  Patient relapsed again in 2008 and was treated with Taxol carboplatin.  After 3 cycles of systemic chemotherapy she had problems with carboplatin so she finished 3 more cycles with Taxol.  She did well again for 2 to 3 years until she had a relapse in 2012 and she had intraperitoneal chemotherapy treatment with Taxol.  Patient was last seen by her oncologist in 2014 at which time she had relatively stable disease with normal tumor marker and no significant abnormal images.  Patient moved to [place] after that and she was lost for oncology follow-ups. Rafaela Hernandez states that she came back to [place], [place] 3 years ago and has been following with an oncologist since. Patient states her last chemo therapy was last week on February 18th, 2022. Re imaging confirmed metastatic relapse. Biopsy confirmed ovarian cancer recurrence. Scan showed extensive intraabdominal and splenic involvement and lung mets. CT brain was unremarkable on 2/20/22.     Upon my evaluation, patient is aphasic, alert, not oriented or following commands. Past Medical History:     Past Medical History:   Diagnosis Date    Anemia     Bleeding 10/2020    intra-abdominal bleeding -due to splenic mass with GI infiltration. Status post embolization    Cervical cancer ([address]     Depression     Diabetes mellitus ([address]     GERD (gastroesophageal reflux disease)     Hx of blood clots     Hypertension     Metastatic cancer ([address] 10/2020    extensive intraabdominal and splenic involvement and lung mets.  Ovarian cancer ([address]     low grade serous ovarian carcinoma    Post chemo evaluation     2007: Chemo via med onc (Dr. Ovidio Sexton), 2008: [place] Colon due to rising CA-125, 2013: intraperitoneal chemo,12/2015: Ca125 - 25     Splenic lesion         Past Surgical History:     Past Surgical History:   Procedure Laterality Date    ABSCESS DRAINAGE  2013    Franca rectal    ANUS SURGERY      ANAL FISSURECTOMY    CARDIAC CATHETERIZATION      COLECTOMY  03/2013    ex lap, tumor debulking, transverse colectomy w reanastamosis, subgastric omentectomy, intraperitoneal port placement    HYSTERECTOMY, TOTAL ABDOMINAL      IR EMBOLIZATION HEMORRHAGE  10/05/2020    intra-abdominal bleeding -due to splenic mass with GI infiltration. Status post embolization [employer] interlock coils x7. mri condtional 3t ok, safe immediately post implant.     IR PORT PLACEMENT EQUAL OR GREATER THAN 5 YEARS  08/24/2020    IR PORT PLACEMENT EQUAL OR GREATER THAN 5 YEARS 8/24/2020 Yonatan Strong MD [facility]    PORT SURGERY      IP Port    TONSILLECTOMY Medications Prior to Admission:     Prior to Admission medications    Medication Sig Start Date End Date Taking? Authorizing Provider   LORazepam (ATIVAN) 1 MG tablet Take 1 tablet by mouth every 8 hours as needed for Anxiety for up to 30 doses. 2/18/22 4/2/22  Tylor Alas MD   polyethylene glycol (MIRALAX) 17 g packet Take 17 g by mouth daily as needed for Constipation 2/18/22 3/20/22  Tylor Alas MD   lamoTRIgine (LAMICTAL) 25 MG tablet TAKE 3 TABS IN IN THE MORNING AND 3 TABS IN IN THE EVENING 2/9/22   Marar Tierney Babin MD   levETIRAcetam (KEPPRA) 750 MG tablet Take 2 tablets by mouth 2 times daily 2/9/22   Staci Farris MD   diazePAM 5 MG/0.1ML LIQD 1 puff by Nasal route as needed (seizures)  Patient not taking: Reported on 2/18/2022 2/9/22   Staci Farris MD   morphine (MS CONTIN) 30 MG extended release tablet TAKE 1 TABLET BY MOUTH 2 TIMES DAILY FOR 30 DAYS. 1/27/22 2/26/22  [facility] MD Sriram   morphine (MS CONTIN) 15 MG extended release tablet TAKE 1 TABLET BY MOUTH 2 TIMES DAILY FOR 30 DAYS. 1/27/22 2/26/22  Tylor Alas MD   oxyCODONE-acetaminophen (PERCOCET) 5-325 MG per tablet TAKE 1 TABLET BY MOUTH EVERY 4 HOURS AS NEEDED FOR PAIN (BREAKTHROUGH PAIN) - REDUCE DOSES TAKEN AS PAIN BECOMES MANAGEABLE 1/27/22 2/26/22  Tylor Alas MD   pantoprazole (PROTONIX) 40 MG tablet Take 1 tablet by mouth daily 1/3/22   Tylor Alas MD   ondansetron (ZOFRAN-ODT) 4 MG disintegrating tablet Take 1 tablet by mouth every 8 hours as needed for Nausea or Vomiting 12/3/21   Tylor Alas MD   naloxegol (MOVANTIK) 12.5 MG TABS tablet Take 1 tablet by mouth every morning  Patient not taking: Reported on 2/18/2022 11/15/21   Tylor Alas MD   ferrous sulfate (IRON 325) 325 (65 Fe) MG tablet Take 1 tablet by mouth daily (with breakfast) 10/31/21   Nunu Osei MD   morphine (MSIR) 30 MG tablet Take 30 mg by mouth 2 times daily as needed for Pain. Historical Provider, MD   sertraline (ZOLOFT) 100 MG tablet TAKE 1 TABLET BY MOUTH DAILY 10/4/21 2/18/22  Chris Lima MD   ELIQUIS 5 MG TABS tablet Take 5 mg by mouth 2 times daily  21   Historical Provider, MD        Allergies:     Ceftriaxone    Social History:     Tobacco:    reports that she has been smoking cigarettes. She has been smoking about 1.00 pack per day. She uses smokeless tobacco.  Alcohol:      reports previous alcohol use. Drug Use:  reports no history of drug use.     Family History:     Family History   Problem Relation Age of Onset    Alcohol Abuse Mother     Cirrhosis Mother        Review of Systems:     Review of Systems   Unable to perform ROS: Acuity of condition   patient when awaken from sleep.started having auditory and visual hallucination    Physical Exam:   BP (!) 157/60   Pulse 52   Temp 98.6 °F (37 °C) (Oral)   Resp 16   Wt 168 lb (76.2 kg)   SpO2 94%   BMI 27.96 kg/m²   Temp (24hrs), Av.1 °F (37.3 °C), Min:98.3 °F (36.8 °C), Max:100.4 °F (38 °C)    Recent Labs     22  0405   POCGLU 173*       Intake/Output Summary (Last 24 hours) at 2022 1203  Last data filed at 2022 1645  Gross per 24 hour   Intake --   Output 950 ml   Net -950 ml         Neurologic Exam     couldnot be performed as patient was aggressive once awaken from sleep                                                 Investigations:      Laboratory Testing:  Recent Results (from the past 24 hour(s))   Basic Metabolic Panel w/ Reflex to MG    Collection Time: 22  5:41 AM   Result Value Ref Range    Glucose 89 70 - 99 mg/dL    BUN 7 6 - 20 mg/dL    CREATININE 0.33 (L) 0.50 - 0.90 mg/dL    Calcium 8.5 (L) 8.6 - 10.4 mg/dL    Sodium 138 135 - 144 mmol/L    Potassium 3.4 (L) 3.7 - 5.3 mmol/L    Chloride 103 98 - 107 mmol/L    CO2 21 20 - 31 mmol/L    Anion Gap 14 9 - 17 mmol/L    GFR Non-African American >60 >60 mL/min    GFR African American >60 >60 mL/min    GFR Comment CBC with Auto Differential    Collection Time: 02/23/22  5:41 AM   Result Value Ref Range    WBC 8.8 3.5 - 11.3 k/uL    RBC 3.54 (L) 3.95 - 5.11 m/uL    Hemoglobin 10.1 (L) 11.9 - 15.1 g/dL    Hematocrit 34.2 (L) 36.3 - 47.1 %    MCV 96.6 82.6 - 102.9 fL    MCH 28.5 25.2 - 33.5 pg    MCHC 29.5 28.4 - 34.8 g/dL    RDW 17.0 (H) 11.8 - 14.4 %    Platelets 202 (H) 079 - 453 k/uL    MPV 9.6 8.1 - 13.5 fL    NRBC Automated 0.0 0.0 per 100 WBC    RBC Morphology ANISOCYTOSIS PRESENT     Seg Neutrophils 85 (H) 36 - 65 %    Lymphocytes 12 (L) 24 - 43 %    Monocytes 2 (L) 3 - 12 %    Eosinophils % 0 (L) 1 - 4 %    Basophils 1 0 - 2 %    Immature Granulocytes 0 0 %    Segs Absolute 7.47 1.50 - 8.10 k/uL    Absolute Lymph # 1.04 (L) 1.10 - 3.70 k/uL    Absolute Mono # 0.16 0.10 - 1.20 k/uL    Absolute Eos # <0.03 0.00 - 0.44 k/uL    Basophils Absolute 0.08 0.00 - 0.20 k/uL    Absolute Immature Granulocyte 0.03 0.00 - 0.30 k/uL   Magnesium    Collection Time: 02/23/22  5:41 AM   Result Value Ref Range    Magnesium 1.9 1.6 - 2.6 mg/dL     Recent Labs     02/23/22  0541   WBC 8.8   RBC 3.54*   HGB 10.1*   HCT 34.2*   MCV 96.6   MCH 28.5   MCHC 29.5   RDW 17.0*   *   MPV 9.6     Recent Labs     02/23/22  0541      K 3.4*      CO2 21   BUN 7   CREATININE 0.33*   GLUCOSE 89   CALCIUM 8.5*     Hemoglobin A1C   Date Value Ref Range Status   03/10/2021 6.0 4.0 - 6.0 % Final       Assessment :      Primary Problem  Dehydration    Active Hospital Problems    Diagnosis Date Noted    Acute metabolic encephalopathy [M90.48]     Confusion [R41.0]     Urinary tract infection without hematuria [N39.0]     Seizure disorder ([address] [G40.909]     Breakthrough seizure ([address] [G40.919]     Lactic acidosis [E87.2] 02/21/2022    Ileus ([address] [K56.7] 02/21/2022    Elevated troponin [R77.8] 02/21/2022    Pericardial effusion [I31.3] 02/21/2022    Hypokalemia [E87.6] 02/21/2022    Acute respiratory failure with hypoxia (HCC) [J96.01] 02/21/2022    Dehydration [E86.0] 02/20/2022    AMS (altered mental status) [R41.82] 02/20/2022    Ovarian cancer ([facility] [C56.9] 05/29/2021    Chronic deep vein thrombosis (DVT) of femoral vein of left lower extremity ([address] [I82.512] 03/11/2021    Fatigue [R53.83]     Cancer, metastatic to bone ([facility] [C79.51] 01/28/2021    Encephalopathy acute [G93.40]     Type 2 diabetes mellitus without complication, without long-term current use of insulin ([facility] [E11.9]     Seizure ([facility] [R56.9] 10/21/2020       Patient is a 60 y.o. Non- / non  female  with a PMH of ovarian cancer with metastasis post chemotherapy/radiotherapy couple of days ago presented with N/V, chills, fatigue and altered mental status.  Patient's urine culture was positive for E coli, currently on Unasyn. Patient admitted to inpatient status for further management. Patient had seizure O/N and was loaded with 2mg Ativan and 1g Keppra. Lamictal was held yesterday as patient not tolerating oral feeds. MRI Brain and EEG to be done today.       Plan:     1.continue current regimen  ,100 mg bid lamictal and 1500 keppra bid   2. Continue Unasyn for ongoing UTI  3. MRI Brain W WO Contrast scheduled for today   4. LTME hooked   5. Tylenol or toradol 15mg PRN for pain  6. Neuro checks per protocol   7. Neurology will continue following patient    Follow-up further recommendations after discussing the case with attending  The plan was discussed with the patient, patient's family and the medical staff. Consultations:   IP CONSULT TO ONCOLOGY  IP CONSULT TO INTERNAL MEDICINE  IP CONSULT TO CASE MANAGEMENT  IP CONSULT TO NEUROLOGY    Patient is admitted as inpatient status because of co-morbidities listed above, severity of signs and symptoms as outlined, requirement for current medical therapies and most importantly because of direct risk to patient if care not provided in a hospital setting.     Marsha Siddiqi MD  Internal Medicine Resident, PGY2   [address] [place] [address], [id number]  2/23/2022,12:08 PM

## 2022-02-23 NOTE — PROGRESS NOTES
CT of abd was on hold today until NG tube could be placed for oral contrast. Once patient had NG tube and KUB verified placement the patient had already been started on continuous EEG. Per EEG tech patient was unable to come off monitoring because the patient did not have CT or MRI wires on. This RN notified CT. Oral contrast was not given to the patient.

## 2022-02-23 NOTE — FLOWSHEET NOTE
followed up with request to see pt. From [address] at [facility]. Pt. Is in bed, lethargic and disoriented. Two staff members are in her room and  introduces herself but pt. Is very sleepy and  reports that she will return at a later time when pt. Is feeling up to visit. Pt. Resting in comfort.      02/23/22 1546   Encounter Summary   Services provided to: Patient   Referral/Consult From: Other    Support System Children   Continue Visiting   (02/23/2022)   Complexity of Encounter Low   Length of Encounter 15 minutes   Spiritual Assessment Completed Yes   Routine   Type Follow up   Assessment Coping   Intervention Nurtured hope;Sustaining presence/ Ministry of presence   Outcome Engaged in conversation;Coping;Encouraged

## 2022-02-23 NOTE — PROGRESS NOTES
Today's Date: 2/23/2022  Patient Name: Margret Lutz  Date of admission: 2/20/2022  9:53 AM  Patient's age: 60 y.o., 1965  Admission Dx: Dehydration [E86.0]  Confusion [R41.0]  Hypoxia [R09.02]  Fatigue, unspecified type [R53.83]  AMS (altered mental status) [R41.82]    Reason for Consult: management recommendations  Requesting Physician: Eric Romero MD    CHIEF COMPLAINT: Nausea vomiting abdominal pain. History Obtained From:  patient, electronic medical record    Interval history:    Patient seen and examined  Labs and vitals reviewed  Patient remains confused unable to give any meaningful history  No other interval event per nurse  CT scans show increase in size of pulmonary metastasis. Also shows increase in size of peritoneal metastasis. Worsening stricture of the sigmoid colon. But no obstruction      HISTORY OF PRESENT ILLNESS:      The patient is a 60 y.o.  female who is admitted to the hospital for chief complains abdominal pain nausea vomiting which started yesterday. Patient's oncologic history is as below. Patient also recently had a DVT for which she has been started on Eliquis. Patient had a KUB done which shows findings of ileus. Patient last CT abdomen pelvis from 10/29. Patient is also on antiseizure medication. Patient is admitted with above complaints. Underwent placement of NG tube per IR. Patient is well-known to our practice. She has history of recurrent ovarian cancer. Patient is currently on cytotoxic therapy using Gemzar and has been tolerating it well. Patient last Ca1 25 was relatively stable.   Patient CT chest from 2/20/2022 shows bilateral lung nodules thoracic adenopathy and sclerotic bony lesions      Past Medical History:   has a past medical history of Anemia, Bleeding, Cervical cancer ([address] Depression, Diabetes mellitus ([address] GERD (gastroesophageal reflux disease), Hx of blood clots, Hypertension, Metastatic cancer ([address] Ovarian cancer [facility]), Post chemo evaluation, and Splenic lesion. Past Surgical History:   has a past surgical history that includes Hysterectomy, total abdominal; Port Surgery; Tonsillectomy; IR PORT PLACEMENT > 5 YEARS (08/24/2020); Anus surgery; Abscess Drainage (2013); colectomy (03/2013); IR EMBOLIZATION HEMORRHAGE (10/05/2020); and Cardiac catheterization. Medications:    Prior to Admission medications    Medication Sig Start Date End Date Taking? Authorizing Provider   LORazepam (ATIVAN) 1 MG tablet Take 1 tablet by mouth every 8 hours as needed for Anxiety for up to 30 doses. 2/18/22 4/2/22  Evert Ferraro MD   polyethylene glycol (MIRALAX) 17 g packet Take 17 g by mouth daily as needed for Constipation 2/18/22 3/20/22  Evert Ferraro MD   lamoTRIgine (LAMICTAL) 25 MG tablet TAKE 3 TABS IN IN THE MORNING AND 3 TABS IN IN THE EVENING 2/9/22   Marar Daniel Verma MD   levETIRAcetam (KEPPRA) 750 MG tablet Take 2 tablets by mouth 2 times daily 2/9/22   Marianne Murphy MD   diazePAM 5 MG/0.1ML LIQD 1 puff by Nasal route as needed (seizures)  Patient not taking: Reported on 2/18/2022 2/9/22   Marianne Murphy MD   morphine (MS CONTIN) 30 MG extended release tablet TAKE 1 TABLET BY MOUTH 2 TIMES DAILY FOR 30 DAYS. 1/27/22 2/26/22  Evalina Schwab Al-Khalili, MD   morphine (MS CONTIN) 15 MG extended release tablet TAKE 1 TABLET BY MOUTH 2 TIMES DAILY FOR 30 DAYS.  1/27/22 2/26/22  Evert Ferraro MD   oxyCODONE-acetaminophen (PERCOCET) 5-325 MG per tablet TAKE 1 TABLET BY MOUTH EVERY 4 HOURS AS NEEDED FOR PAIN (BREAKTHROUGH PAIN) - REDUCE DOSES TAKEN AS PAIN BECOMES MANAGEABLE 1/27/22 2/26/22  Evert Ferraro MD   pantoprazole (PROTONIX) 40 MG tablet Take 1 tablet by mouth daily 1/3/22   Evert Ferraro MD   ondansetron (ZOFRAN-ODT) 4 MG disintegrating tablet Take 1 tablet by mouth every 8 hours as needed for Nausea or Vomiting 12/3/21   Evert Ferraro MD   naloxegol (MOVANTIK) 12.5 MG TABS tablet Take 1 tablet by mouth every morning  Patient not taking: Reported on 2/18/2022 11/15/21   Cliff Mart MD   ferrous sulfate (IRON 325) 325 (65 Fe) MG tablet Take 1 tablet by mouth daily (with breakfast) 10/31/21   Brit Higgins MD   morphine (MSIR) 30 MG tablet Take 30 mg by mouth 2 times daily as needed for Pain.     Historical Provider, MD   sertraline (ZOLOFT) 100 MG tablet TAKE 1 TABLET BY MOUTH DAILY 10/4/21 2/18/22  Cliff Mart MD   ELIQUIS 5 MG TABS tablet Take 5 mg by mouth 2 times daily  5/26/21   Historical Provider, MD     Current Facility-Administered Medications   Medication Dose Route Frequency Provider Last Rate Last Admin    labetalol (NORMODYNE;TRANDATE) injection 10 mg  10 mg IntraVENous Q6H PRN Radha Rai MD        Or    hydrALAZINE (APRESOLINE) injection 10 mg  10 mg IntraVENous Q6H PRN Radha Rai MD        LORazepam (ATIVAN) tablet 0.5 mg  0.5 mg Oral Q4H PRN Radha Rai MD   0.5 mg at 02/23/22 1513    lamoTRIgine (LAMICTAL) tablet 100 mg  100 mg Oral BID Justyn Mccain MD   100 mg at 02/23/22 0859    bisacodyl (DULCOLAX) suppository 10 mg  10 mg Rectal Daily PRN Radha Rai MD        enoxaparin (LOVENOX) injection 40 mg  40 mg SubCUTAneous BID Radha Rai MD   40 mg at 02/23/22 0900    albuterol (PROVENTIL) nebulizer solution 2.5 mg  2.5 mg Nebulization As Directed RT PRN Radha Rai MD        ampicillin-sulbactam (UNASYN) 3000 mg ivpb minibag  3,000 mg IntraVENous Q6H Radha Rai MD   Stopped at 02/23/22 1310    sodium chloride flush 0.9 % injection 5-40 mL  5-40 mL IntraVENous 2 times per day Gloria Pinch, DO   10 mL at 02/20/22 2023    sodium chloride flush 0.9 % injection 5-40 mL  5-40 mL IntraVENous PRN Gloria Pinch, DO        0.9 % sodium chloride infusion  25 mL IntraVENous PRN Gloira Pinch, DO        sodium chloride flush 0.9 % injection 5-40 mL  5-40 mL IntraVENous 2 times per day Radha Rai MD   10 mL at 02/20/22 6646    sodium chloride flush 0.9 % injection 5-40 mL  5-40 mL IntraVENous PRN Sophia Baldwin MD   10 mL at 02/22/22 0918    0.9 % sodium chloride infusion  25 mL IntraVENous PRN Sophia Baldwin MD        acetaminophen (TYLENOL) tablet 650 mg  650 mg Oral Q6H PRN Sophia Baldwin MD   650 mg at 02/22/22 1625    Or    acetaminophen (TYLENOL) suppository 650 mg  650 mg Rectal Q6H PRN Sophia Baldwin MD        0.9 % sodium chloride infusion   IntraVENous Continuous Sophia Baldwin MD 75 mL/hr at 02/23/22 0904 New Bag at 02/23/22 0904    potassium chloride (KLOR-CON M) extended release tablet 40 mEq  40 mEq Oral PRN Sohpia Baldwin MD        Or    potassium bicarb-citric acid (EFFER-K) effervescent tablet 40 mEq  40 mEq Oral PRN Sophia Baldwin MD        Or    potassium chloride 10 mEq/100 mL IVPB (Peripheral Line)  10 mEq IntraVENous PRN Sophia Baldwin MD        levETIRAcetam (KEPPRA) 1500 mg/100 mL IVPB  1,500 mg IntraVENous Q12H Sophia Baldwin MD   Stopped at 02/23/22 1131    morphine (PF) injection 1 mg  1 mg IntraVENous Q4H PRN Sophia Baldwin MD   1 mg at 02/21/22 2225    pantoprazole (PROTONIX) injection 40 mg  40 mg IntraVENous Daily Sophia Baldwin MD   40 mg at 02/23/22 0900    And    sodium chloride (PF) 0.9 % injection 10 mL  10 mL IntraVENous Daily Sophia Baldwin MD   10 mL at 02/23/22 0900    promethazine (PHENERGAN) tablet 12.5 mg  12.5 mg Oral Q6H PRN Sophia Baldwin MD   12.5 mg at 02/20/22 2305    Or    ondansetron (ZOFRAN) injection 4 mg  4 mg IntraVENous Q6H PRN Sophia Baldwin MD   4 mg at 02/21/22 2225       Allergies:  Ceftriaxone    Social History:   reports that she has been smoking cigarettes. She has been smoking about 1.00 pack per day. She uses smokeless tobacco. She reports previous alcohol use. She reports that she does not use drugs. Family History: family history includes Alcohol Abuse in her mother; Cirrhosis in her mother.     REVIEW OF SYSTEMS:      Patient not able to give any meaningful history due to altered mental status    PHYSICAL EXAM: BP (!) 157/60   Pulse 52   Temp 98.6 °F (37 °C) (Oral)   Resp 16   Wt 168 lb (76.2 kg)   SpO2 94%   BMI 27.96 kg/m²    Temp (24hrs), Av.5 °F (36.9 °C), Min:98.3 °F (36.8 °C), Max:98.6 °F (37 °C)      General appearance -sick appearing, no in pain or distress   Mental status -confused  Eyes - pupils equal and reactive, extraocular eye movements intact   Ears - bilateral TM's and external ear canals normal   Mouth - mucous membranes moist, pharynx normal without lesions. NG tube in place  Neck - supple, no significant adenopathy   Lymphatics - no palpable lymphadenopathy, no hepatosplenomegaly   Chest - clear to auscultation, no wheezes, rales or rhonchi, symmetric air entry   Heart - normal rate, regular rhythm, normal S1, S2, no murmurs  Abdomen - soft, nontender, nondistended, no masses or organomegaly   Neurological -confused and not following commands  Musculoskeletal - no joint tenderness, deformity or swelling   Extremities - peripheral pulses normal, no pedal edema, no clubbing or cyanosis   Skin - normal coloration and turgor, no rashes, no suspicious skin lesions noted ,      DATA:      Labs:     Results for orders placed or performed during the hospital encounter of 22   Culture, Urine    Specimen: Urine, clean catch   Result Value Ref Range    Specimen Description . CLEAN CATCH URINE     Culture ESCHERICHIA COLI 10 to 50,000 CFU/ML        Susceptibility    Escherichia coli - BACTERIAL SUSCEPTIBILITY PANEL TAQUERIA     ampicillin <=2 Sensitive      aztreonam <=1 Sensitive      ceFAZolin* <=4 Sensitive       * Cefazolin sensitivity results can be used to predict the effectiveness of oral cephalosporins (eg.  Cephalexin) in uncomplicated Urinary Tract Infections due to E. coli, K. pneumoniae, and P. mirabilis     cefTRIAXone <=1 Sensitive      ciprofloxacin <=0.25 Sensitive      Confirmatory Extended Spectrum Beta-Lactamase NEGATIVE Negative      gentamicin <=1 Sensitive      nitrofurantoin <=16 Sensitive      tobramycin <=1 Sensitive      trimethoprim-sulfamethoxazole <=20 Sensitive      piperacillin-tazobactam <=4 Sensitive    Culture, Blood 2    Specimen: Blood   Result Value Ref Range    Specimen Description . BLOOD     Special Requests L HAND 2ML     Culture NO GROWTH 3 DAYS    Culture, Blood 1    Specimen: Blood   Result Value Ref Range    Specimen Description . BLOOD     Special Requests R HAND 20ML     Culture NO GROWTH 3 DAYS    CBC with Auto Differential   Result Value Ref Range    WBC 20.0 (H) 3.5 - 11.3 k/uL    RBC 4.08 3.95 - 5.11 m/uL    Hemoglobin 11.9 11.9 - 15.1 g/dL    Hematocrit 39.5 36.3 - 47.1 %    MCV 96.8 82.6 - 102.9 fL    MCH 29.2 25.2 - 33.5 pg    MCHC 30.1 28.4 - 34.8 g/dL    RDW 17.7 (H) 11.8 - 14.4 %    Platelets 844 (H) 563 - 453 k/uL    MPV 9.3 8.1 - 13.5 fL    NRBC Automated 0.0 0.0 per 100 WBC    Differential Type NOT REPORTED     WBC Morphology NOT REPORTED     RBC Morphology NOT REPORTED     Platelet Estimate NOT REPORTED     Immature Granulocytes 0 0 %    Seg Neutrophils 88 (H) 36 - 66 %    Lymphocytes 1 (L) 24 - 44 %    Monocytes 11 (H) 1 - 7 %    Eosinophils % 0 (L) 1 - 4 %    Basophils 0 0 - 2 %    Absolute Immature Granulocyte 0.00 0.00 - 0.30 k/uL    Segs Absolute 17.60 (H) 1.8 - 7.7 k/uL    Absolute Lymph # 0.20 (L) 1.0 - 4.8 k/uL    Absolute Mono # 2.20 (H) 0.1 - 0.8 k/uL    Absolute Eos # 0.00 0.0 - 0.4 k/uL    Basophils Absolute 0.00 0.0 - 0.2 k/uL    Morphology ANISOCYTOSIS PRESENT    Comprehensive Metabolic Panel w/ Reflex to MG   Result Value Ref Range    Glucose 150 (H) 70 - 99 mg/dL    BUN 13 6 - 20 mg/dL    CREATININE 0.47 (L) 0.50 - 0.90 mg/dL    Bun/Cre Ratio NOT REPORTED 9 - 20    Calcium 9.0 8.6 - 10.4 mg/dL    Sodium 142 135 - 144 mmol/L    Potassium 3.4 (L) 3.7 - 5.3 mmol/L    Chloride 103 98 - 107 mmol/L    CO2 22 20 - 31 mmol/L    Anion Gap 17 9 - 17 mmol/L    Alkaline Phosphatase 65 35 - 104 U/L    ALT 7 5 - 33 U/L    AST 12 <32 U/L    Total Bilirubin 0.16 (L) 0.3 - 1.2 mg/dL    Total Protein 6.7 6.4 - 8.3 g/dL    Albumin 4.2 3.5 - 5.2 g/dL    Albumin/Globulin Ratio 1.7 1.0 - 2.5    GFR Non-African American >60 >60 mL/min    GFR African American >60 >60 mL/min    GFR Comment          GFR Staging NOT REPORTED    Lipase   Result Value Ref Range    Lipase 13 13 - 60 U/L   Troponin   Result Value Ref Range    Troponin, High Sensitivity 51 (H) 0 - 14 ng/L    Troponin T NOT REPORTED <0.03 ng/mL    Troponin Interp NOT REPORTED    Urinalysis with Microscopic   Result Value Ref Range    Color, UA Yellow Yellow    Turbidity UA Turbid (A) Clear    Glucose, Ur 2+ (A) NEGATIVE    Bilirubin Urine NEGATIVE NEGATIVE    Ketones, Urine NEGATIVE NEGATIVE    Specific Gravity, UA 1.017 1.005 - 1.030    Urine Hgb NEGATIVE NEGATIVE    pH, UA 5.5 5.0 - 8.0    Protein, UA NEGATIVE NEGATIVE    Urobilinogen, Urine Normal Normal    Nitrite, Urine NEGATIVE NEGATIVE    Leukocyte Esterase, Urine NEGATIVE NEGATIVE    -          WBC, UA None 0 - 5 /HPF    RBC, UA 0 TO 2 0 - 4 /HPF    Casts UA NOT REPORTED 0 - 8 /LPF    Crystals, UA NOT REPORTED None /HPF    Epithelial Cells UA 0 TO 2 0 - 5 /HPF    Renal Epithelial, UA NOT REPORTED 0 /HPF    Bacteria, UA NOT REPORTED None    Mucus, UA NOT REPORTED None    Trichomonas, UA NOT REPORTED None    Amorphous, UA NOT REPORTED None    Other Observations UA NOT REPORTED NOT REQ.     Yeast, UA NOT REPORTED None   Lactate, Sepsis   Result Value Ref Range    Lactic Acid, Sepsis NOT REPORTED 0.5 - 1.9 mmol/L    Lactic Acid, Sepsis, Whole Blood 2.8 (H) 0.5 - 1.9 mmol/L   Lactate, Sepsis   Result Value Ref Range    Lactic Acid, Sepsis NOT REPORTED 0.5 - 1.9 mmol/L    Lactic Acid, Sepsis, Whole Blood 1.5 0.5 - 1.9 mmol/L   Lamotrigine Level   Result Value Ref Range    Lamotrigine Lvl 1.3 (L) 3.0 - 15.0 ug/mL   Levetiracetam Level   Result Value Ref Range    Levetiracetam Lvl 22 ug/mL   Troponin   Result Value Ref Range    Troponin, High Sensitivity 57 (HH) 0 - 14 ng/L    Troponin T NOT REPORTED <0.03 ng/mL    Troponin Interp NOT REPORTED    Magnesium   Result Value Ref Range    Magnesium 1.8 1.6 - 2.6 mg/dL   Brain Natriuretic Peptide   Result Value Ref Range    Pro- (H) <300 pg/mL    BNP Interpretation NOT REPORTED    Basic Metabolic Panel w/ Reflex to MG   Result Value Ref Range    Glucose 109 (H) 70 - 99 mg/dL    BUN 8 6 - 20 mg/dL    CREATININE 0.39 (L) 0.50 - 0.90 mg/dL    Bun/Cre Ratio NOT REPORTED 9 - 20    Calcium 8.9 8.6 - 10.4 mg/dL    Sodium 137 135 - 144 mmol/L    Potassium 3.6 (L) 3.7 - 5.3 mmol/L    Chloride 103 98 - 107 mmol/L    CO2 22 20 - 31 mmol/L    Anion Gap 12 9 - 17 mmol/L    GFR Non-African American >60 >60 mL/min    GFR African American >60 >60 mL/min    GFR Comment          GFR Staging NOT REPORTED    CBC with Auto Differential   Result Value Ref Range    WBC 14.1 (H) 3.5 - 11.3 k/uL    RBC 3.56 (L) 3.95 - 5.11 m/uL    Hemoglobin 10.5 (L) 11.9 - 15.1 g/dL    Hematocrit 33.7 (L) 36.3 - 47.1 %    MCV 94.7 82.6 - 102.9 fL    MCH 29.5 25.2 - 33.5 pg    MCHC 31.2 28.4 - 34.8 g/dL    RDW 17.4 (H) 11.8 - 14.4 %    Platelets 134 (H) 048 - 453 k/uL    MPV 9.4 8.1 - 13.5 fL    NRBC Automated 0.0 0.0 per 100 WBC    Differential Type NOT REPORTED     WBC Morphology NOT REPORTED     RBC Morphology ANISOCYTOSIS PRESENT     Platelet Estimate NOT REPORTED     Seg Neutrophils 91 (H) 36 - 65 %    Lymphocytes 6 (L) 24 - 43 %    Monocytes 3 3 - 12 %    Eosinophils % 0 (L) 1 - 4 %    Basophils 0 0 - 2 %    Immature Granulocytes 1 (H) 0 %    Segs Absolute 12.77 (H) 1.50 - 8.10 k/uL    Absolute Lymph # 0.78 (L) 1.10 - 3.70 k/uL    Absolute Mono # 0.38 0.10 - 1.20 k/uL    Absolute Eos # <0.03 0.00 - 0.44 k/uL    Basophils Absolute 0.05 0.00 - 0.20 k/uL    Absolute Immature Granulocyte 0.08 0.00 - 0.30 k/uL   Lamotrigine Level   Result Value Ref Range    Lamotrigine Lvl 1.3 (L) 3.0 - 15.0 ug/mL   Levetiracetam Level   Result Value Ref Range Levetiracetam Lvl 4 ug/mL   DRUG SCREEN MULTI URINE   Result Value Ref Range    Amphetamine Screen, Ur NEGATIVE NEGATIVE    Barbiturate Screen, Ur NEGATIVE NEGATIVE    Benzodiazepine Screen, Urine NEGATIVE NEGATIVE    Cocaine Metabolite, Urine NEGATIVE NEGATIVE    Methadone Screen, Urine NEGATIVE NEGATIVE    Opiates, Urine POSITIVE (A) NEGATIVE    Phencyclidine, Urine NEGATIVE NEGATIVE    Propoxyphene, Urine NOT REPORTED NEGATIVE    Cannabinoid Scrn, Ur NEGATIVE NEGATIVE    Oxycodone Screen, Ur NEGATIVE NEGATIVE    Methamphetamine, Urine NOT REPORTED NEGATIVE    Tricyclic Antidepressants, Urine NOT REPORTED NEGATIVE    MDMA, Urine NOT REPORTED NEGATIVE    Buprenorphine Urine NOT REPORTED NEGATIVE    Test Information       Assay provides medical screening only. The absence of expected drug(s) and/or metabolite(s) may indicate diluted or adulterated urine, limitations of testing or timing of collection.    Troponin   Result Value Ref Range    Troponin, High Sensitivity 17 (H) 0 - 14 ng/L    Troponin T NOT REPORTED <0.03 ng/mL    Troponin Interp NOT REPORTED    Troponin   Result Value Ref Range    Troponin, High Sensitivity 14 0 - 14 ng/L    Troponin T NOT REPORTED <0.03 ng/mL    Troponin Interp NOT REPORTED    Prolactin   Result Value Ref Range    Prolactin 4.52 (L) 4.79 - 23.30 ug/L   Procalcitonin   Result Value Ref Range    Procalcitonin 0.25 (H) <0.09 ng/mL   Troponin   Result Value Ref Range    Troponin, High Sensitivity 16 (H) 0 - 14 ng/L    Troponin T NOT REPORTED <0.03 ng/mL    Troponin Interp NOT REPORTED    CK   Result Value Ref Range    Total  26 - 192 U/L   Myoglobin, Serum   Result Value Ref Range    Myoglobin 62 (H) 25 - 58 ng/mL   Basic Metabolic Panel w/ Reflex to MG   Result Value Ref Range    Glucose 103 (H) 70 - 99 mg/dL    BUN 8 6 - 20 mg/dL    CREATININE 0.35 (L) 0.50 - 0.90 mg/dL    Calcium 9.1 8.6 - 10.4 mg/dL    Sodium 137 135 - 144 mmol/L    Potassium 3.6 (L) 3.7 - 5.3 mmol/L    Chloride 100 98 - 107 mmol/L    CO2 23 20 - 31 mmol/L    Anion Gap 14 9 - 17 mmol/L    GFR Non-African American >60 >60 mL/min    GFR African American >60 >60 mL/min    GFR Comment         CBC with Auto Differential   Result Value Ref Range    WBC 14.2 (H) 3.5 - 11.3 k/uL    RBC 3.88 (L) 3.95 - 5.11 m/uL    Hemoglobin 11.2 (L) 11.9 - 15.1 g/dL    Hematocrit 38.7 36.3 - 47.1 %    MCV 99.7 82.6 - 102.9 fL    MCH 28.9 25.2 - 33.5 pg    MCHC 28.9 28.4 - 34.8 g/dL    RDW 17.3 (H) 11.8 - 14.4 %    Platelets 759 (H) 911 - 453 k/uL    MPV 9.6 8.1 - 13.5 fL    NRBC Automated 0.0 0.0 per 100 WBC    RBC Morphology ANISOCYTOSIS PRESENT     Seg Neutrophils 91 (H) 36 - 65 %    Lymphocytes 7 (L) 24 - 43 %    Monocytes 1 (L) 3 - 12 %    Eosinophils % 0 (L) 1 - 4 %    Basophils 1 0 - 2 %    Immature Granulocytes 1 (H) 0 %    Segs Absolute 13.01 (H) 1.50 - 8.10 k/uL    Absolute Lymph # 0.92 (L) 1.10 - 3.70 k/uL    Absolute Mono # 0.15 0.10 - 1.20 k/uL    Absolute Eos # <0.03 0.00 - 0.44 k/uL    Basophils Absolute 0.07 0.00 - 0.20 k/uL    Absolute Immature Granulocyte 0.07 0.00 - 0.30 k/uL   Ferritin   Result Value Ref Range    Ferritin 202 (H) 13 - 150 ug/L   Iron and TIBC   Result Value Ref Range    Iron 46 37 - 145 ug/dL    TIBC 256 250 - 450 ug/dL    Iron Saturation 18 (L) 20 - 55 %    UIBC 210 112 - 347 ug/dL   Vitamin B12 & Folate   Result Value Ref Range    Vitamin B-12 313 232 - 1245 pg/mL    Folate 14.4 >4.8 ng/mL   Magnesium   Result Value Ref Range    Magnesium 1.7 1.6 - 2.6 mg/dL   Basic Metabolic Panel w/ Reflex to MG   Result Value Ref Range    Glucose 89 70 - 99 mg/dL    BUN 7 6 - 20 mg/dL    CREATININE 0.33 (L) 0.50 - 0.90 mg/dL    Calcium 8.5 (L) 8.6 - 10.4 mg/dL    Sodium 138 135 - 144 mmol/L    Potassium 3.4 (L) 3.7 - 5.3 mmol/L    Chloride 103 98 - 107 mmol/L    CO2 21 20 - 31 mmol/L    Anion Gap 14 9 - 17 mmol/L    GFR Non-African American >60 >60 mL/min    GFR African American >60 >60 mL/min    GFR Comment         CBC with Auto Differential   Result Value Ref Range    WBC 8.8 3.5 - 11.3 k/uL    RBC 3.54 (L) 3.95 - 5.11 m/uL    Hemoglobin 10.1 (L) 11.9 - 15.1 g/dL    Hematocrit 34.2 (L) 36.3 - 47.1 %    MCV 96.6 82.6 - 102.9 fL    MCH 28.5 25.2 - 33.5 pg    MCHC 29.5 28.4 - 34.8 g/dL    RDW 17.0 (H) 11.8 - 14.4 %    Platelets 612 (H) 138 - 453 k/uL    MPV 9.6 8.1 - 13.5 fL    NRBC Automated 0.0 0.0 per 100 WBC    RBC Morphology ANISOCYTOSIS PRESENT     Seg Neutrophils 85 (H) 36 - 65 %    Lymphocytes 12 (L) 24 - 43 %    Monocytes 2 (L) 3 - 12 %    Eosinophils % 0 (L) 1 - 4 %    Basophils 1 0 - 2 %    Immature Granulocytes 0 0 %    Segs Absolute 7.47 1.50 - 8.10 k/uL    Absolute Lymph # 1.04 (L) 1.10 - 3.70 k/uL    Absolute Mono # 0.16 0.10 - 1.20 k/uL    Absolute Eos # <0.03 0.00 - 0.44 k/uL    Basophils Absolute 0.08 0.00 - 0.20 k/uL    Absolute Immature Granulocyte 0.03 0.00 - 0.30 k/uL   Magnesium   Result Value Ref Range    Magnesium 1.9 1.6 - 2.6 mg/dL   POC Glucose Fingerstick   Result Value Ref Range    POC Glucose 173 (H) 65 - 105 mg/dL   EKG 12 Lead   Result Value Ref Range    Ventricular Rate 90 BPM    Atrial Rate 90 BPM    P-R Interval 162 ms    QRS Duration 90 ms    Q-T Interval 386 ms    QTc Calculation (Bazett) 472 ms    P Axis 49 degrees    R Axis 87 degrees    T Axis 55 degrees   EKG 12 Lead   Result Value Ref Range    Ventricular Rate 99 BPM    Atrial Rate 99 BPM    P-R Interval 172 ms    QRS Duration 92 ms    Q-T Interval 364 ms    QTc Calculation (Bazett) 467 ms    P Axis 45 degrees    R Axis -5 degrees    T Axis 36 degrees         IMAGING DATA:    XR ABDOMEN (KUB) (SINGLE AP VIEW)    Result Date: 2/20/2022  EXAMINATION: ONE SUPINE XRAY VIEW(S) OF THE ABDOMEN 2/20/2022 11:18 pm COMPARISON: January 4, 2021 HISTORY: ORDERING SYSTEM PROVIDED HISTORY: rule out ileus TECHNOLOGIST PROVIDED HISTORY: rule out ileus Reason for Exam: supine,nausea,vomiting FINDINGS: 37 mm calcified lesion right upper quadrant may represent a gallstone. New metallic coils noted in the left upper quadrant. A catheter is noted extending from the left upper quadrant to the pelvis and appears unchanged. Gas-filled loops of small large bowel. Increased stool. Osseous structures normal.  Intravenous contrast is noted in the bladder. Ileus. Probable gallstone. Catheter left abdomen and pelvis requires clinical correlation. CT Head WO Contrast    Result Date: 2/20/2022  EXAMINATION: CT OF THE HEAD WITHOUT CONTRAST  2/20/2022 1:44 pm TECHNIQUE: CT of the head was performed without the administration of intravenous contrast. Dose modulation, iterative reconstruction, and/or weight based adjustment of the mA/kV was utilized to reduce the radiation dose to as low as reasonably achievable. COMPARISON: 06/18/2021, MRI 06/18/2021 HISTORY: ORDERING SYSTEM PROVIDED HISTORY:  Eavl for mets TECHNOLOGIST PROVIDED HISTORY: Eavl for mets Decision Support Exception - unselect if not a suspected or confirmed emergency medical condition->Emergency Medical Condition (MA) Reason for Exam:  Eval for mets FINDINGS: BRAIN/VENTRICLES: There is no acute intracranial hemorrhage, mass effect or midline shift. No abnormal extra-axial fluid collection. The gray-white differentiation is maintained without evidence of an acute infarct. There is no evidence of hydrocephalus. Subtle low attenuation in the deep white matter which is nonspecific likely due to chronic small vessel ischemia. Overall appearance is similar to the prior study. CT with contrast or MRI would be more sensitive to evaluate for metastatic disease. ORBITS: The visualized portion of the orbits demonstrate no acute abnormality. SINUSES: The visualized paranasal sinuses demonstrate no acute abnormality. Tiny retention cyst versus focal mucosal thickening left maxillary sinus. Minimal fluid/opacification left mastoid air cells.  SOFT TISSUES/SKULL:  No acute abnormality of the visualized skull or soft tissues. Similar small right temporal flat skin lesion. No acute intracranial abnormality. XR CHEST PORTABLE    Result Date: 2/20/2022  EXAMINATION: ONE XRAY VIEW OF THE CHEST 2/20/2022 11:51 am COMPARISON: 06/22/2021, 06/21/2021 HISTORY: ORDERING SYSTEM PROVIDED HISTORY: eval for pmn TECHNOLOGIST PROVIDED HISTORY: eval for pmn FINDINGS: The cardiomediastinal silhouette is unchanged in appearance. Right internal jugular port in place. Generalized interstitial prominence again noted. There is no consolidation, pneumothorax, or evidence of edema. No effusion is appreciated. The osseous structures are unchanged in appearance. Vascular coil pack in the left upper quadrant. Chronic findings in the chest without acute airspace disease identified. CT CHEST PULMONARY EMBOLISM W CONTRAST    Result Date: 2/20/2022  EXAMINATION: CTA OF THE CHEST 2/20/2022 1:48 pm TECHNIQUE: CTA of the chest was performed after the administration of intravenous contrast.  Multiplanar reformatted images are provided for review. MIP images are provided for review. Dose modulation, iterative reconstruction, and/or weight based adjustment of the mA/kV was utilized to reduce the radiation dose to as low as reasonably achievable. COMPARISON: CT chest 03/09/2021. CT abdomen and pelvis 10/29/2021. HISTORY: ORDERING SYSTEM PROVIDED HISTORY: eval for pe TECHNOLOGIST PROVIDED HISTORY: eval for pe Decision Support Exception - unselect if not a suspected or confirmed emergency medical condition->Emergency Medical Condition (MA) Reason for Exam: eval for pe FINDINGS: Pulmonary Arteries: Pulmonary arteries are adequately opacified for evaluation. No evidence of intraluminal filling defect to suggest pulmonary embolism. Main pulmonary artery is normal in caliber. Mediastinum: Mildly enlarged confluent hilar lymph nodes and lymphatic tissue in the AP window and subcarinal regions again demonstrated.   Small pericardial effusion, similar in appearance. There is no acute abnormality of the thoracic aorta. Right internal jugular port in place. Lungs/pleura: Respiratory motion artifact noted. Mild septal thickening. Persistent nodular opacity in the left lower lobe on axial image 63 measuring up to 2.6 cm. Scattered ground-glass nodules are again demonstrated bilaterally. The amount of left pleural fluid has diminished in the interval with trace amount remaining. Soft tissue calcification near the supra [place] IVC again demonstrated. Upper Abdomen: Coarse calcification near the medial and inferior aspect of the spleen partially visualized, as seen previously. Dense metallic artifact corresponding to metallic coil pack near the spleen again demonstrated. Cholelithiasis. Soft Tissues/Bones: Blastic metastatic deposits predominantly in the lower thoracic and upper lumbar spine again demonstrated. 1.  No evidence for acute pulmonary embolism. 2.  Bilateral pulmonary nodules, thoracic lymphadenopathy, soft tissue calcifications in the chest and upper abdomen and sclerotic bone lesions again demonstrated, as described previously, which may be related to the patient's history of malignancy. 3.  Small pericardial effusion. Trace left pleural effusion. 4.  Mild septal thickening suggestive of interstitial edema. IR PLACE NG TUBE BY DR Tima Toure    Result Date: 2/21/2022  PROCEDURE: XR PLACE NASOGASTRIC TUBE PHYS 2/21/2022 HISTORY: ORDERING SYSTEM PROVIDED HISTORY: ileus TECHNOLOGIST PROVIDED HISTORY: ileus Ileus CONTRAST: None SEDATION: None FLUOROSCOPY DOSE AND TYPE OR TIME AND EXPOSURES: Fluoro time 0.7 minutes  cGy cm 2 DESCRIPTION OF PROCEDURE: This procedure was performed by Yovani DAVID under indirect supervision of . Roark protocol was observed. An attempt was made to pass a 12 [place] sump through right and left nostrils without success due to stricture in the nasal cavity.   Under fluoroscopic guidance, through the left nostril, a 12 [place] sump nasogastric tube was negotiated into the stomach. With catheter manipulation, the tip of the catheter was manipulated into the distal stomach/proximal duodenum. Air bolus administration confirmed satisfactory tip position. The catheter was secured appropriately at 74 cm. Estimated blood loss was 0 mL. The patient tolerated the procedure well. Successful placement of nasogastric tube. Okay to use. IMPRESSION:   Primary Problem  Dehydration    Active Hospital Problems    Diagnosis Date Noted    Acute metabolic encephalopathy [Y07.69]     Confusion [R41.0]     Urinary tract infection without hematuria [N39.0]     Seizure disorder ([address] [G40.909]     Breakthrough seizure ([address] [G40.919]     Lactic acidosis [E87.2] 02/21/2022    Ileus ([address] [K56.7] 02/21/2022    Elevated troponin [R77.8] 02/21/2022    Pericardial effusion [I31.3] 02/21/2022    Hypokalemia [E87.6] 02/21/2022    Acute respiratory failure with hypoxia (HCC) [J96.01] 02/21/2022    Dehydration [E86.0] 02/20/2022    AMS (altered mental status) [R41.82] 02/20/2022    Ovarian cancer ([address] [C56.9] 05/29/2021    Chronic deep vein thrombosis (DVT) of femoral vein of left lower extremity ([address] [I82.512] 03/11/2021    Fatigue [R53.83]     Cancer, metastatic to bone ([address] [C79.51] 01/28/2021    Encephalopathy acute [G93.40]     Type 2 diabetes mellitus without complication, without long-term current use of insulin ([address] [E11.9]     Seizure ([address] [R56.9] 10/21/2020       Recurrent ovarian cancer chemotherapy using gemcitabine with stable CA-125  Abdominal pain nausea vomiting  Ileus per KUB  Seizure disorder  DVT of femoral vein on anticoagulation using Eliquis      RECOMMENDATIONS:  1. I personally reviewed results of lab work-up imaging studies and other relevant clinical data. 2. Discussed results of CT abdomen pelvis with patient's daughter at bedside.   CT scan showed disease progression  3. Awaiting MRI brain  4. Patient remains confused. 5. Continue symptomatic and supportive care  6. We will follow. Discussed with patient and Nurse. Thank you for asking us to see this patient. Amy Newberry MD          This note is created with the assistance of a speech recognition program.  While intending to generate a document that actually reflects the content of the visit, the document can still have some errors including those of syntax and sound a like substitutions which may escape proof reading. It such instances, actual meaning can be extrapolated by contextual diversion.

## 2022-02-23 NOTE — PROGRESS NOTES
[facility])  Occupational Therapy Not Seen Note    DATE: 2022    NAME: Preeti Carey  MRN: [id number]   : 1965      Patient not seen this date for Occupational Therapy due to:    Patient is not appropriate for active participation in OT evaluation/treatment at this time d/t agitated, shouting upon arrival, not appropriate.      Next Scheduled Treatment: Ck      Electronically signed by Geeta Singletary OT on 2022 at 10:46 AM

## 2022-02-23 NOTE — PROCEDURES
LONG-TERM EEG-VIDEO [address]    [facility]  Age: 60 y.o. MRN: [id number]    Referring Physician: No ref. provider found  History: The patient is a 60 y.o. female who presented breakthrough seizure/encephalopathy. This long-term video-EEG monitoring study was performed to determine the nature of the patient's clinical events. The patient is on neuroactive medications.    [facility]   Current Facility-Administered Medications   Medication Dose Route Frequency Provider Last Rate Last Admin    potassium chloride 10 mEq/100 mL IVPB (Peripheral Line)  10 mEq IntraVENous Q1H Reatha Libman,  mL/hr at 02/23/22 0905 10 mEq at 02/23/22 0905    magnesium sulfate 2000 mg in 50 mL IVPB premix  2,000 mg IntraVENous Once Reatha Libman, MD        labetalol (NORMODYNE;TRANDATE) injection 10 mg  10 mg IntraVENous Q6H PRN Reatha Libman, MD        Or    hydrALAZINE (APRESOLINE) injection 10 mg  10 mg IntraVENous Q6H PRN Reatha Libman, MD        lamoTRIgine (LAMICTAL) tablet 100 mg  100 mg Oral BID Jossie Booth MD   100 mg at 02/23/22 0859    bisacodyl (DULCOLAX) suppository 10 mg  10 mg Rectal Daily PRN Reatha Libman, MD        enoxaparin (LOVENOX) injection 40 mg  40 mg SubCUTAneous BID Reatha Libman, MD   40 mg at 02/23/22 0900    albuterol (PROVENTIL) nebulizer solution 2.5 mg  2.5 mg Nebulization As Directed RT PRN Reatha Libman, MD        ampicillin-sulbactam (UNASYN) 3000 mg ivpb minibag  3,000 mg IntraVENous Q6H Reatha Libman, MD   Stopped at 02/23/22 0758    sodium chloride flush 0.9 % injection 5-40 mL  5-40 mL IntraVENous 2 times per day Odell Carnes, DO   10 mL at 02/20/22 2023    sodium chloride flush 0.9 % injection 5-40 mL  5-40 mL IntraVENous PRN Odell Craftar, DO        0.9 % sodium chloride infusion  25 mL IntraVENous PRN Odell Craftar, DO        sodium chloride flush 0.9 % injection 5-40 mL 5-40 mL IntraVENous 2 times per day Nicolasa Dominguez MD   10 mL at 02/20/22 2306    sodium chloride flush 0.9 % injection 5-40 mL  5-40 mL IntraVENous PRN Nicolasa Dominguez MD   10 mL at 02/22/22 0918    0.9 % sodium chloride infusion  25 mL IntraVENous PRN Nicolasa Dominguez MD        acetaminophen (TYLENOL) tablet 650 mg  650 mg Oral Q6H PRN Nicolasa Dominguez MD   650 mg at 02/22/22 1625    Or    acetaminophen (TYLENOL) suppository 650 mg  650 mg Rectal Q6H PRN Nicolasa Dominguez MD        0.9 % sodium chloride infusion   IntraVENous Continuous Nicolasa Dominguez MD 75 mL/hr at 02/23/22 0904 New Bag at 02/23/22 0904    potassium chloride (KLOR-CON M) extended release tablet 40 mEq  40 mEq Oral PRN Nicolasa Dominguez MD        Or    potassium bicarb-citric acid (EFFER-K) effervescent tablet 40 mEq  40 mEq Oral PRN Nicolasa Dominguez MD        Or    potassium chloride 10 mEq/100 mL IVPB (Peripheral Line)  10 mEq IntraVENous PRN Nicolasa Dominguez MD        levETIRAcetam (KEPPRA) 1500 mg/100 mL IVPB  1,500 mg IntraVENous Q12H Nicolasa Dominguez MD   Stopped at 02/22/22 2325    morphine (PF) injection 1 mg  1 mg IntraVENous Q4H PRN Nicolasa Dominguez MD   1 mg at 02/21/22 2225    pantoprazole (PROTONIX) injection 40 mg  40 mg IntraVENous Daily Nicolasa Dominguez MD   40 mg at 02/23/22 0900    And    sodium chloride (PF) 0.9 % injection 10 mL  10 mL IntraVENous Daily Nicolasa Dominguez MD   10 mL at 02/23/22 0900    promethazine (PHENERGAN) tablet 12.5 mg  12.5 mg Oral Q6H PRN Nicolasa Dominguez MD   12.5 mg at 02/20/22 2305    Or    ondansetron (ZOFRAN) injection 4 mg  4 mg IntraVENous Q6H PRN Nicolasa Dominguez MD   4 mg at 02/21/22 2225     Technical Description: This is a 21-channel digital EEG recording with time-locked video. Electrodes were placed in accordance with the 10-20 International System of Electrode Placement. Single lead EKG monitoring was included. Baseline EEG Recording:  A formal baseline EEG recording was not obtained.      Day 1 - 2/23/22, starting at 15:25    Interictal EEG Samples: In the alert state, the posterior background rhythm showed 6 to 7 Hz of polymorphic theta activity of 25 to 30 µV. Frequent left occipital temporal sharp waves 50 to 70 µV were seen, often occurring in a semi-periodic pattern with underlying rhythmicity consistent with LPD's plus R. During behavioral sleep, sleep spindles were seen symmetric over both hemispheres. The EKG channel revealed no abnormalities. Ictal EEG Recording / Patient Events: During this period the patient had no events or seizures. Summary: During this day of recording no events were recorded. The interictal EEG was abnormal due to diffuse polymorphic theta slowing suggesting mild encephalopathy. Frequent left occipital temporal lateralized periodic discharges with underlying rhythmicity (LPDs + R) conferred an increased risk for focal onset seizures. Monitoring was continued in order to record the patient's typical events. The EKG channel revealed no abnormalities. Day 2 - 2/23/22, reviewed through 7:30 am    Interictal EEG Samples: Interictal EEG was unchanged from yesterday. Ictal EEG Recording / Patient Events: During this period the patient had no events or seizures. Summary: During this day of recording no events were recorded. The interictal EEG was abnormal due to diffuse polymorphic theta slowing suggesting mild encephalopathy. Frequent left occipital temporal lateralized periodic discharges with underlying rhythmicity (LPDs + R) conferred an increased risk for focal onset seizures. Monitoring was continued in order to record the patient's typical events. The EKG channel revealed no abnormalities. Colton Young MD  Diplomate, American Board of Psychiatry and Neurology  Diplomate, American Board of Clinical Neurophysiology  Diplomate, American Board of Epilepsy       Please note this is a preliminary report and updated daily.  The final report will have a summary of behavior and electrographic findings with clinical correlation.

## 2022-02-24 ENCOUNTER — APPOINTMENT (OUTPATIENT)
Dept: MRI IMAGING | Age: 57
DRG: 388 | End: 2022-02-24
Payer: COMMERCIAL

## 2022-02-24 ENCOUNTER — APPOINTMENT (OUTPATIENT)
Dept: GENERAL RADIOLOGY | Age: 57
DRG: 388 | End: 2022-02-24
Payer: COMMERCIAL

## 2022-02-24 LAB
ABSOLUTE EOS #: 0.04 K/UL (ref 0–0.44)
ABSOLUTE IMMATURE GRANULOCYTE: <0.03 K/UL (ref 0–0.3)
ABSOLUTE LYMPH #: 1.29 K/UL (ref 1.1–3.7)
ABSOLUTE MONO #: 0.32 K/UL (ref 0.1–1.2)
ANION GAP SERPL CALCULATED.3IONS-SCNC: 15 MMOL/L (ref 9–17)
BASOPHILS # BLD: 1 % (ref 0–2)
BASOPHILS ABSOLUTE: 0.08 K/UL (ref 0–0.2)
BUN BLDV-MCNC: 7 MG/DL (ref 6–20)
CALCIUM SERPL-MCNC: 9 MG/DL (ref 8.6–10.4)
CHLORIDE BLD-SCNC: 101 MMOL/L (ref 98–107)
CO2: 22 MMOL/L (ref 20–31)
CREAT SERPL-MCNC: 0.35 MG/DL (ref 0.5–0.9)
EOSINOPHILS RELATIVE PERCENT: 1 % (ref 1–4)
GFR AFRICAN AMERICAN: >60 ML/MIN
GFR NON-AFRICAN AMERICAN: >60 ML/MIN
GFR SERPL CREATININE-BSD FRML MDRD: ABNORMAL ML/MIN/{1.73_M2}
GLUCOSE BLD-MCNC: 69 MG/DL (ref 70–99)
GLUCOSE BLD-MCNC: 71 MG/DL (ref 65–105)
GLUCOSE BLD-MCNC: 74 MG/DL (ref 65–105)
GLUCOSE BLD-MCNC: 79 MG/DL (ref 65–105)
GLUCOSE BLD-MCNC: 96 MG/DL (ref 65–105)
HCT VFR BLD CALC: 36.8 % (ref 36.3–47.1)
HEMOGLOBIN: 11.1 G/DL (ref 11.9–15.1)
IMMATURE GRANULOCYTES: 0 %
LYMPHOCYTES # BLD: 19 % (ref 24–43)
MCH RBC QN AUTO: 29 PG (ref 25.2–33.5)
MCHC RBC AUTO-ENTMCNC: 30.2 G/DL (ref 28.4–34.8)
MCV RBC AUTO: 96.1 FL (ref 82.6–102.9)
MONOCYTES # BLD: 5 % (ref 3–12)
NRBC AUTOMATED: 0.3 PER 100 WBC
PDW BLD-RTO: 16.7 % (ref 11.8–14.4)
PLATELET # BLD: 454 K/UL (ref 138–453)
PMV BLD AUTO: 9.7 FL (ref 8.1–13.5)
POTASSIUM SERPL-SCNC: 3.7 MMOL/L (ref 3.7–5.3)
RBC # BLD: 3.83 M/UL (ref 3.95–5.11)
RBC # BLD: ABNORMAL 10*6/UL
SEG NEUTROPHILS: 74 % (ref 36–65)
SEGMENTED NEUTROPHILS ABSOLUTE COUNT: 4.98 K/UL (ref 1.5–8.1)
SODIUM BLD-SCNC: 138 MMOL/L (ref 135–144)
WBC # BLD: 6.7 K/UL (ref 3.5–11.3)

## 2022-02-24 PROCEDURE — 36415 COLL VENOUS BLD VENIPUNCTURE: CPT

## 2022-02-24 PROCEDURE — 2580000003 HC RX 258: Performed by: STUDENT IN AN ORGANIZED HEALTH CARE EDUCATION/TRAINING PROGRAM

## 2022-02-24 PROCEDURE — 70553 MRI BRAIN STEM W/O & W/DYE: CPT

## 2022-02-24 PROCEDURE — 99232 SBSQ HOSP IP/OBS MODERATE 35: CPT | Performed by: INTERNAL MEDICINE

## 2022-02-24 PROCEDURE — 97162 PT EVAL MOD COMPLEX 30 MIN: CPT

## 2022-02-24 PROCEDURE — 1200000000 HC SEMI PRIVATE

## 2022-02-24 PROCEDURE — 99232 SBSQ HOSP IP/OBS MODERATE 35: CPT | Performed by: PSYCHIATRY & NEUROLOGY

## 2022-02-24 PROCEDURE — 2500000003 HC RX 250 WO HCPCS: Performed by: STUDENT IN AN ORGANIZED HEALTH CARE EDUCATION/TRAINING PROGRAM

## 2022-02-24 PROCEDURE — 6360000002 HC RX W HCPCS: Performed by: STUDENT IN AN ORGANIZED HEALTH CARE EDUCATION/TRAINING PROGRAM

## 2022-02-24 PROCEDURE — 97535 SELF CARE MNGMENT TRAINING: CPT

## 2022-02-24 PROCEDURE — 6370000000 HC RX 637 (ALT 250 FOR IP): Performed by: STUDENT IN AN ORGANIZED HEALTH CARE EDUCATION/TRAINING PROGRAM

## 2022-02-24 PROCEDURE — 6360000004 HC RX CONTRAST MEDICATION: Performed by: STUDENT IN AN ORGANIZED HEALTH CARE EDUCATION/TRAINING PROGRAM

## 2022-02-24 PROCEDURE — 6370000000 HC RX 637 (ALT 250 FOR IP)

## 2022-02-24 PROCEDURE — C9113 INJ PANTOPRAZOLE SODIUM, VIA: HCPCS | Performed by: STUDENT IN AN ORGANIZED HEALTH CARE EDUCATION/TRAINING PROGRAM

## 2022-02-24 PROCEDURE — 92610 EVALUATE SWALLOWING FUNCTION: CPT

## 2022-02-24 PROCEDURE — 97166 OT EVAL MOD COMPLEX 45 MIN: CPT

## 2022-02-24 PROCEDURE — 93005 ELECTROCARDIOGRAM TRACING: CPT | Performed by: STUDENT IN AN ORGANIZED HEALTH CARE EDUCATION/TRAINING PROGRAM

## 2022-02-24 PROCEDURE — 74018 RADEX ABDOMEN 1 VIEW: CPT

## 2022-02-24 PROCEDURE — A9576 INJ PROHANCE MULTIPACK: HCPCS | Performed by: STUDENT IN AN ORGANIZED HEALTH CARE EDUCATION/TRAINING PROGRAM

## 2022-02-24 PROCEDURE — 97530 THERAPEUTIC ACTIVITIES: CPT

## 2022-02-24 PROCEDURE — 6360000002 HC RX W HCPCS

## 2022-02-24 PROCEDURE — 82947 ASSAY GLUCOSE BLOOD QUANT: CPT

## 2022-02-24 PROCEDURE — 80048 BASIC METABOLIC PNL TOTAL CA: CPT

## 2022-02-24 PROCEDURE — 85025 COMPLETE CBC W/AUTO DIFF WBC: CPT

## 2022-02-24 RX ORDER — LORAZEPAM 2 MG/ML
0.5 INJECTION INTRAMUSCULAR ONCE
Status: COMPLETED | OUTPATIENT
Start: 2022-02-24 | End: 2022-02-24

## 2022-02-24 RX ORDER — DEXTROSE MONOHYDRATE 50 MG/ML
100 INJECTION, SOLUTION INTRAVENOUS PRN
Status: DISCONTINUED | OUTPATIENT
Start: 2022-02-24 | End: 2022-02-28 | Stop reason: HOSPADM

## 2022-02-24 RX ORDER — NICOTINE POLACRILEX 4 MG
15 LOZENGE BUCCAL PRN
Status: DISCONTINUED | OUTPATIENT
Start: 2022-02-24 | End: 2022-02-28 | Stop reason: HOSPADM

## 2022-02-24 RX ORDER — DEXTROSE MONOHYDRATE 25 G/50ML
25 INJECTION, SOLUTION INTRAVENOUS ONCE
Status: COMPLETED | OUTPATIENT
Start: 2022-02-24 | End: 2022-02-24

## 2022-02-24 RX ORDER — DEXTROSE MONOHYDRATE 25 G/50ML
12.5 INJECTION, SOLUTION INTRAVENOUS PRN
Status: DISCONTINUED | OUTPATIENT
Start: 2022-02-24 | End: 2022-02-28 | Stop reason: HOSPADM

## 2022-02-24 RX ORDER — POTASSIUM CHLORIDE 7.45 MG/ML
30 INJECTION INTRAVENOUS ONCE
Status: COMPLETED | OUTPATIENT
Start: 2022-02-24 | End: 2022-02-24

## 2022-02-24 RX ORDER — HALOPERIDOL 5 MG/ML
1 INJECTION INTRAMUSCULAR
Status: COMPLETED | OUTPATIENT
Start: 2022-02-24 | End: 2022-02-24

## 2022-02-24 RX ORDER — MAGNESIUM SULFATE 1 G/100ML
1000 INJECTION INTRAVENOUS ONCE
Status: COMPLETED | OUTPATIENT
Start: 2022-02-24 | End: 2022-02-24

## 2022-02-24 RX ADMIN — AMPICILLIN AND SULBACTAM 3000 MG: 1; 2 INJECTION, POWDER, FOR SOLUTION INTRAMUSCULAR; INTRAVENOUS at 06:29

## 2022-02-24 RX ADMIN — LAMOTRIGINE 100 MG: 100 TABLET ORAL at 23:19

## 2022-02-24 RX ADMIN — SODIUM CHLORIDE, PRESERVATIVE FREE 10 ML: 5 INJECTION INTRAVENOUS at 22:12

## 2022-02-24 RX ADMIN — PANTOPRAZOLE SODIUM 40 MG: 40 INJECTION, POWDER, FOR SOLUTION INTRAVENOUS at 08:01

## 2022-02-24 RX ADMIN — AMPICILLIN AND SULBACTAM 3000 MG: 1; 2 INJECTION, POWDER, FOR SOLUTION INTRAMUSCULAR; INTRAVENOUS at 13:21

## 2022-02-24 RX ADMIN — AMPICILLIN AND SULBACTAM 3000 MG: 1; 2 INJECTION, POWDER, FOR SOLUTION INTRAMUSCULAR; INTRAVENOUS at 22:07

## 2022-02-24 RX ADMIN — ENOXAPARIN SODIUM 40 MG: 100 INJECTION SUBCUTANEOUS at 00:01

## 2022-02-24 RX ADMIN — SODIUM CHLORIDE, PRESERVATIVE FREE 10 ML: 5 INJECTION INTRAVENOUS at 07:59

## 2022-02-24 RX ADMIN — HALOPERIDOL LACTATE 1 MG: 5 INJECTION, SOLUTION INTRAMUSCULAR at 17:17

## 2022-02-24 RX ADMIN — SODIUM CHLORIDE: 9 INJECTION, SOLUTION INTRAVENOUS at 01:08

## 2022-02-24 RX ADMIN — LAMOTRIGINE 100 MG: 100 TABLET ORAL at 00:01

## 2022-02-24 RX ADMIN — LORAZEPAM 0.5 MG: 2 INJECTION INTRAMUSCULAR; INTRAVENOUS at 22:03

## 2022-02-24 RX ADMIN — GADOTERIDOL 15 ML: 279.3 INJECTION, SOLUTION INTRAVENOUS at 17:55

## 2022-02-24 RX ADMIN — LEVETIRACETAM 1500 MG: 15 INJECTION INTRAVENOUS at 22:10

## 2022-02-24 RX ADMIN — POTASSIUM CHLORIDE 30 MEQ: 7.46 INJECTION, SOLUTION INTRAVENOUS at 12:20

## 2022-02-24 RX ADMIN — LEVETIRACETAM 1500 MG: 15 INJECTION INTRAVENOUS at 10:01

## 2022-02-24 RX ADMIN — ENOXAPARIN SODIUM 40 MG: 100 INJECTION SUBCUTANEOUS at 22:11

## 2022-02-24 RX ADMIN — ENOXAPARIN SODIUM 40 MG: 100 INJECTION SUBCUTANEOUS at 08:01

## 2022-02-24 RX ADMIN — Medication 25 G: at 08:02

## 2022-02-24 RX ADMIN — LORAZEPAM 0.5 MG: 0.5 TABLET ORAL at 16:52

## 2022-02-24 RX ADMIN — AMPICILLIN AND SULBACTAM 3000 MG: 1; 2 INJECTION, POWDER, FOR SOLUTION INTRAMUSCULAR; INTRAVENOUS at 02:24

## 2022-02-24 RX ADMIN — LEVETIRACETAM 1500 MG: 15 INJECTION INTRAVENOUS at 01:09

## 2022-02-24 RX ADMIN — MAGNESIUM SULFATE HEPTAHYDRATE 1000 MG: 1 INJECTION, SOLUTION INTRAVENOUS at 11:59

## 2022-02-24 RX ADMIN — MORPHINE SULFATE 1 MG: 4 INJECTION INTRAVENOUS at 22:03

## 2022-02-24 RX ADMIN — SODIUM CHLORIDE, PRESERVATIVE FREE 10 ML: 5 INJECTION INTRAVENOUS at 08:00

## 2022-02-24 ASSESSMENT — PAIN SCALES - GENERAL
PAINLEVEL_OUTOF10: 0
PAINLEVEL_OUTOF10: 7

## 2022-02-24 NOTE — PROGRESS NOTES
Physical Therapy    Facility/Department: [facility]  Initial Assessment    NAME: Zuleima Reeves  : 1965  MRN: [id number]    Date of Service: 2022  Chief Complaint   Patient presents with    Altered Mental Status    Nausea & Vomiting     Discharge Recommendations:  Patient would benefit from continued therapy after discharge   Assessment   Body structures, Functions, Activity limitations: Decreased functional mobility ; Decreased strength;Decreased endurance  Assessment: The pt ambulated 25 ft with min handheld assist. She had poor balance with mobilization. She was inconsistent infollowing commands. She could benefit from a continuation of PT for gait and strengthening following her DC  Prognosis: Good  Decision Making: Medium Complexity  PT Education: Goals;PT Role;Plan of Care  REQUIRES PT FOLLOW UP: Yes  Activity Tolerance  Activity Tolerance: Patient limited by endurance; Patient limited by fatigue       Patient Diagnosis(es): The primary encounter diagnosis was Confusion. Diagnoses of Fatigue, unspecified type, Dehydration, and Hypoxia were also pertinent to this visit. has a past medical history of Anemia, Bleeding, Cervical cancer ([facility] Depression, Diabetes mellitus ([address] GERD (gastroesophageal reflux disease), Hx of blood clots, Hypertension, Metastatic cancer ([facility] Ovarian cancer ([facility] Post chemo evaluation, and Splenic lesion. has a past surgical history that includes Hysterectomy, total abdominal; Port Surgery; Tonsillectomy; IR PORT PLACEMENT > 5 YEARS (2020); Anus surgery; Abscess Drainage (); colectomy (2013); IR EMBOLIZATION HEMORRHAGE (10/05/2020); and Cardiac catheterization. Restrictions  Restrictions/Precautions  Restrictions/Precautions: Seizure  Position Activity Restriction  Other position/activity restrictions:  Up with assist  Vision/Hearing  Vision: Within Functional Limits  Hearing: Within functional limits     Subjective  General  Patient assessed for rehabilitation services?: Yes  Response To Previous Treatment: Not applicable  Family / Caregiver Present: Yes (sitter present)  Follows Commands: Impaired (Pt inconsistent in following commands)  Subjective  Subjective: RN gordon davis , Patient reluctant to participate  Pain Screening  Patient Currently in Pain: No  Pain Assessment  Pain Assessment: 0-10  Pain Level: 0  Vital Signs  Patient Currently in Pain: No     Orientation  Orientation  Overall Orientation Status: Impaired  Orientation Level: Oriented to person;Disoriented to situation;Disoriented to place; Disoriented to time  Social/Functional History  Social/Functional History  Lives With: Son  Type of Home: House  Home Layout: Two level  Home Access: Stairs to enter without rails  Entrance Stairs - Number of Steps: 4  Additional Comments: Unable to get any additional prior info from pt or chart  Cognition      Objective     AROM RLE (degrees)  RLE AROM: WFL  AROM LLE (degrees)  LLE AROM : WFL  AROM RUE (degrees)  RUE AROM : WFL  AROM LUE (degrees)  LUE AROM : WFL  Strength RLE  Strength RLE: WFL  Strength LLE  Strength LLE: WFL  Strength RUE  Strength RUE: WFL  Strength LUE  Strength LUE: WFL     Sensation  Overall Sensation Status: WFL  Bed mobility  Supine to Sit: Moderate assistance  Sit to Supine: Moderate assistance  Scooting: Moderate assistance  Transfers  Sit to Stand: Moderate Assistance  Stand to sit: Moderate Assistance  Ambulation  Ambulation?: Yes  Ambulation 1  Surface: level tile  Device: Hand-Held Assist  Gait Deviations: Staggers; Slow [place]; Increased TAWANDA  Distance: amb 25 ft with min handheld assist     Balance  Posture: Good  Sitting - Static: Fair  Sitting - Dynamic: Poor  Standing - Static: Poor  Standing - Dynamic: Poor      Plan   Plan  Times per week: 5-6x wk  Current Treatment Recommendations: Strengthening,Functional Mobility Training,Transfer Training,Gait Training,Cognitive Reorientation,Safety Education & Training,Balance Training,Endurance Training,Stair training  Safety Devices  Type of devices: Nurse notified,Left in bed,Call light within reach,Sitter present    G-Code     OutComes Score              AM-PAC Score  AM-PAC Inpatient Mobility Raw Score : 16 (02/24/22 1532)  AM-PAC Inpatient T-Scale Score : 40.78 (02/24/22 1532)  Mobility Inpatient CMS 0-100% Score: 54.16 (02/24/22 1532)  Mobility Inpatient CMS G-Code Modifier : CK (02/24/22 1532)     Goals  Short term goals  Time Frame for Short term goals: 10 visits  Short term goal 1: transfers with SBA  Short term goal 2: amb 150 ft with a RW x SBA  Short term goal 3: ascend/descend 4steps with SBA  Short term goal 4: 20 min exercise program x SBA       Therapy Time   Individual Concurrent Group Co-treatment   Time In 1403         Time Out 1423         Minutes 20             1 of 135 [address] AURELIO Diaz

## 2022-02-24 NOTE — PROGRESS NOTES
Occupational Therapy   Occupational Therapy Initial Assessment  Date: 2022   Patient Name: Anabel Crenshaw  MRN: [id number]     : 1965    Date of Service: 2022     Chief Complaint   Patient presents with    Altered Mental Status    Nausea & Vomiting       Discharge Recommendations:  Patient would benefit from continued therapy after discharge Pt is currently unsafe to return to their prior living arrangements without 24/7 assist/supervision to safely engage in all aspects of ADLs, IADLs and functional mobility tasks. OT Equipment Recommendations  Equipment Needed: Yes  Mobility Devices: ADL Assistive Devices  ADL Assistive Devices: Grab Bars - shower    Assessment   Performance deficits / Impairments: Decreased functional mobility ; Decreased ADL status; Decreased endurance;Decreased high-level IADLs;Decreased cognition;Decreased safe awareness;Decreased balance  Assessment: Pt supine in bed upon writer's arrival and agreeable to session with max encouragement. Pt completed supine to sit transfer EOB with Mod A for initiation d/t cognition. Pt completed sit<>stand transfers with Min A x 2 and functional mobility with Min A x 2. Multidirectional swaying noted decreasing pt's balance. Required max verbal/tactile cues for cognition. Pt completed grooming tasks with SBA, requiring verbal cues d/t cognition. Pt extremely fixated on her cellphone requiring max redirection throughout entire session. Pt is expected to require skilled OT services during their acute hospitalization stay to address the above noted deficits through skilled occupational therapy intervention for promotion of increased independence throughout ADLs, IADLs and functional mobility tasks. Pt is currently unsafe to return to their prior living arrangements without 24/7 assist/supervision to safely engage in all aspects of ADLs, IADLs and functional mobility tasks.    Prognosis: Good  Decision Making: Medium Complexity  Patient Education: Pt educated on OT role, OT POC, activity promotion, energy conservation, transfer training-fair to poor return  Barriers to Learning: decreased cognition  REQUIRES OT FOLLOW UP: Yes  Activity Tolerance  Activity Tolerance: Treatment limited secondary to decreased cognition  Safety Devices  Safety Devices in place: Yes  Type of devices: Gait belt;Patient at risk for falls; Bed alarm in place;Call light within reach; Left in bed;Nurse notified  Restraints  Initially in place: No           Patient Diagnosis(es): The primary encounter diagnosis was Confusion. Diagnoses of Fatigue, unspecified type, Dehydration, and Hypoxia were also pertinent to this visit. has a past medical history of Anemia, Bleeding, Cervical cancer ([facility] [address] Depression, Diabetes mellitus ([facility] [address] GERD (gastroesophageal reflux disease), Hx of blood clots, Hypertension, Metastatic cancer ([facility] [address] Ovarian cancer ([facility] [address] Post chemo evaluation, and Splenic lesion. has a past surgical history that includes Hysterectomy, total abdominal; Port Surgery; Tonsillectomy; IR PORT PLACEMENT > 5 YEARS (08/24/2020); Anus surgery; Abscess Drainage (2013); colectomy (03/2013); IR EMBOLIZATION HEMORRHAGE (10/05/2020); and Cardiac catheterization. Restrictions  Restrictions/Precautions  Restrictions/Precautions: Seizure,Fall Risk  Required Braces or Orthoses?: No  Position Activity Restriction  Other position/activity restrictions: Up with assist ;medical history of ovarian cancer with metastasis s/p chemo and radiation therapy; Seizure-like activity 2/23AM-daugther stated pt does present different for 4-5 days post sx typically. Subjective   General  Patient assessed for rehabilitation services?: Yes  Family / Caregiver Present: No  General Comment  Comments: RN ok'd for OT/PT eval this PM. Pt agreeable to session, pleasent/cooperative throughout.   Patient Currently in Pain: Denies  Pain Assessment  Pain Assessment: 0-10  Pain Level: 0    Social/Functional History  Social/Functional History  Lives With: Son  Type of Home: House  Home Layout: Two level  Home Access: Stairs to enter without rails  Entrance Stairs - Number of Steps: 4  Bathroom Shower/Tub: Tub/Shower unit,Shower chair without back  Bathroom Toilet: Standard  Bathroom Equipment: Shower chair  Home Equipment: Standard walker  ADL Assistance: Needs assistance (from last OT eval from prior admission daughter was asssiting with bathing only)  Homemaking Assistance: Needs assistance  Homemaking Responsibilities: No (Son assists with all bathing)  Ambulation Assistance: Independent  Transfer Assistance: Independent  Active : No  Patient's  Info: daughter drives  Leisure & Hobbies: be outside in nature  Additional Comments: Information collected above from pt's chart. Unable to obtain d/t pt's cognition and daughter had to leave. All information collected above is from pt's therapy eval from prior [facility] June of 2021. Objective   Vision:  (Unable to formally assess d/t cognition)  Hearing:  (Unable to formally assess d/t cognition)    Orientation  Overall Orientation Status: Impaired  Orientation Level: Oriented to person;Disoriented to situation;Disoriented to place; Disoriented to time     Balance  Sitting Balance: Stand by assistance (Seated EOB unsupported for ~8 minutes)  Standing Balance: Minimal assistance  Standing Balance  Time: 2 min  Activity: static standing  Comment: decreased balance, multidirectional swaying    Functional Mobility  Functional - Mobility Device: No device  Activity: To/from bathroom  Assist Level: Minimal assistance (x2)  Functional Mobility Comments: Min A x 2; Hand held assist x 2; Decreased balance and decreased cognition requiring max verbal cues for navigation, sequencing, initiation. Pt motivated by writer placing phone accross room to retrieve d/t being fixated on phone.     ADL  Feeding: Minimal assistance;Setup;Verbal cueing; Increased time to complete  Grooming: Verbal cueing;Setup; Increased time to complete;Stand by assistance (Pt required SBA for washing face and combing hair. Cues for initiation, requiring writer to place phone out of sight.)  UE Bathing: Minimal assistance;Setup;Verbal cueing; Increased time to complete  LE Bathing: Moderate assistance; Increased time to complete;Setup;Verbal cueing  UE Dressing: Minimal assistance;Setup;Verbal cueing; Increased time to complete  LE Dressing: Moderate assistance; Increased time to complete;Setup;Verbal cueing  Toileting: Moderate assistance;Setup; Increased time to complete  Additional Comments: Pt very fixated on cellphone w/ multiple attempts to redirect. Pt declined further ADLs. Pt became tearful stating \"I don't want to spend my last moments with you\". Sitter reported that pt has been thinking it is her last day to live. Pt provided theraputic listening and emotional support with good return. Tone RUE  RUE Tone: Normotonic  Tone LUE  LUE Tone: Normotonic  Quality of Movement Other  Comment: Unable to formally assess d/t cognition     Bed mobility  Supine to Sit: Moderate assistance (Assist for initiation of LE and trunk progression)  Sit to Supine: Moderate assistance  Scooting: Minimal assistance  Comment: Max encouragment; HOB elevated; Limited by cognition     Transfers  Sit to stand: Minimal assistance;2 Person assistance  Stand to sit: Minimal assistance;2 Person assistance  Transfer Comments: Min A x 2 (Hand Held Assistance); HOB elevated     Cognition  Overall Cognitive Status: Exceptions  Arousal/Alertness: Delayed responses to stimuli  Following Commands: Follows one step commands with increased time; Follows one step commands with repetition; Inconsistently follows commands  Attention Span: Difficulty dividing attention; Difficulty attending to directions; Attends with cues to redirect  Safety Judgement: Decreased awareness of need for assistance;Decreased awareness of need for safety  Problem Solving: Decreased awareness of errors  Insights: Not aware of deficits  Initiation: Requires cues for all  Sequencing: Requires cues for all  Cognition Comment: Pt upon arrival was continously asking for writer's and daughter to repeat her last name and spell. Pt extremely fixated on her cell phone throughout the entire session. Daughter present for first 2 minutes of session, but had to leave. Daughter reported seizure like episode 2/23 AM. States that when her mom has a seizure her behavior/cognition changes for 4-5 days and then she typically returns to baseline.         Sensation  Overall Sensation Status:  (SMILEY d/t pt's cognition)        LUE AROM (degrees)  LUE AROM : WFL  LUE General AROM: Appears WFL throughout session, able to reach over head, unable to formally assess d/t cognition and not following directions for formal assessment  Left Hand AROM (degrees)  Left Hand AROM: WFL  RUE AROM (degrees)  RUE AROM : WFL  RUE General AROM: Appears WFL throughout session, able to reach over head, unable to formally assess d/t cognition and not following directions for formal assessment  Right Hand AROM (degrees)  Right Hand AROM: WFL  LUE Strength  LUE Strength Comment: Unable to formally assess  RUE Strength  Gross RUE Strength: WFL  RUE Strength Comment: Unable to formally assess                   Plan   Plan  Times per week: 2-4x/wk  Current Treatment Recommendations: Safety Education & Training,Balance Training,Patient/Caregiver Education & Training,Self-Care / ADL,Functional Mobility Training,Home Management Training,Endurance Training,Cognitive/Perceptual Training,Cognitive Reorientation      AM-PAC Score        AM-PAC Inpatient Daily Activity Raw Score: 15 (02/24/22 1712)  AM-PAC Inpatient ADL T-Scale Score : 34.69 (02/24/22 1712)  ADL Inpatient CMS 0-100% Score: 56.46 (02/24/22 1712)  ADL Inpatient CMS G-Code Modifier : CK (02/24/22 1712)    Goals  Short term goals  Time Frame for Short term goals: By discharge, pt will:  Short term goal 1: Demo bed mobility supine<>sit with CGA and <2 VCs to promote increased engagement in ADLs  Short term goal 2: Demo functional sit<>stand transfers and functional mobility   with CGA and LRD PRN  Short term goal 3: Demo +6 minutes of dynamic standing balance with CGA to promote increased engagement in ADLs  Short term goal 4: Demo 2 step commands with 80% accuracy throughout all functional tasks for promotion of increased cognition during ADLs  Short term goal 5: Demo UB ADLs with SBA, setup and use of AE PRN  Short term goal 6: Demo LB ADLs with CGA, setup and use of DME PRN  Short term goal 7: Maintain attention to functional/ADL activity for 3 minutes w/ <2 verbal cues for redirection to task       Therapy Time   Individual Concurrent Group Co-treatment   Time In 1401         Time Out 1421         Minutes 20         Timed Code Treatment Minutes: 8 Minutes       Nils Wilson OTR/L

## 2022-02-24 NOTE — PROGRESS NOTES
[place]  Internal Medicine Teaching Residency Program  Inpatient Daily Progress Note  ______________________________________________________________________________    Patient: Richard West [facility]  YOB: 1965   YNT:[id number]    Acct: [de-identified]     Room: [facility]9/9184-75  Admit date: 2/20/2022  Today's date: 02/24/22  Number of days in the hospital: 4    SUBJECTIVE   Admitting Diagnosis: Dehydration  CC: Altered mental status, Nausea and vomiting. Pt seen and examined. Patient has pulled out her NG tube and is off of LTM E.  Sitter at bedside. No seizures observed overnight. MRI was not taken as patient could not cooperate with. Patient is unable to understand or respond to commands. Patient has elevated blood pressure with /59 mmHg for which she is on labetalol and hydralazine. Patient is saturating 98 % on room air. ROS:  Unable to obtain due to patient's mentation. BRIEF HISTORY     The patient is a 22-year-old female with significant past medical history of ovarian cancer with metastasis s/p chemo and radiotherapy, seizure disorder on Lamictal and Keppra who presents with altered mental status, nausea and vomiting. 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 She had surgical debulking and systemic treatment with Taxol and carboplatin for 6 cycles. She was in remission for about 2 years. She had a relapse in 2007 and was treated with topotecan with good results. Patient relapsed again in 2008 and was treated with Taxol and carboplatin. After 3 cycles of systemic chemotherapy she had problems with carboplatin so she finished 3 more cycles with Taxol. She did well again for 2 to 3 years and then she had a relapse in 2012 and had intraperitoneal chemotherapy treatment with Taxol.   Patient was last seen by her oncologist in 2014 at which time she had relatively stable disease with normal tumor marker and no significant abnormal images. Patient moved to [place] after that and she was lost for oncology follow-ups. Patient states that she came back to [facility] 3 years ago and has been following with an oncologist since. Patient states her last chemotherapy was last week on February 18 2, 2022. She received Gemzar for chemotherapy and nausea, vomiting, diarrhea, mouth sores, drowsiness, muscle aches are some of the known side effects of Gemzar. Reimaging confirmed metastatic relapse and biopsy confirmed ovarian cancer recurrence. Scan showed extensive intra-abdominal and splenic involvement and lung metastasis. CT brain was unremarkable on 2/20/2022. Patient currently has extensive intra-abdominal, splenic and lung involvement from ovarian cancer. Patient with recent intra-abdominal bleeding due to splenic mass with GI infiltration s/p embolization. She is currently on palliative treatment with Doxil. Patient recently had a DVT last week for which she was started on Eliquis. Patient's last chemotherapy was on 2/18/2022 when she received Gemzar for chemotherapy       PMH/Home meds:  Metastatic ovarian cancer with mets to liver, spleen and lungs - palliative chemotherapy  Seizure disorder-Lamictal and Keppra  Type 2 diabetes  DVT on Eliquis  Depression  Anemia on iron supplements  Protonix  Percocet for pain     ER Course  On arrival patient afebrile, hypertensive with blood pressure between 225J to 147L systolic, on 2 L of oxygen via nasal cannula. Patient does not use any home oxygen. Patient's lab at time of admission showed sodium 142, potassium 3.4, bicarb 22, creatinine 0.47, glucose 150, lactic acid 2.8 which improved to 1.5  Patient's proBNP 328, troponins 51-57-17  LFTs normal  Blood glucose 115  Prolactin level 4.52  Keppra levels within normal limits  Lamictal levels 1.3  Patient had leukocytosis 20, hemoglobin 11.9 stable, platelets 689.   Blood cultures and urine cultures were sent  UA unremarkable with 2+ glucosuria but no leukocyte esterase or bacteriuria or WBCs. CT chest for PE was negative, no concerns for pneumonia, showed pericardial effusion which was present on previous CTs as well  CT head was negative  X-ray chest was unremarkable  Abdominal x-ray showed ileus  EKG was unremarkable with 467 QTC  Patient received 1 L fluid bolus in the ER and started on fluids. Patient was transferred to medicine service for further management. OBJECTIVE     Vital Signs:  BP (!) 154/59   Pulse 68   Temp 98.7 °F (37.1 °C) (Oral)   Resp 16   Wt 168 lb (76.2 kg)   SpO2 98%   BMI 27.96 kg/m²     Temp (24hrs), Av.5 °F (36.9 °C), Min:98.2 °F (36.8 °C), Max:98.7 °F (37.1 °C)    In: -   Out: 274 [Urine:300]    Physical Exam:  General:  Awake but confused. HEENT: Atraumatic, normocephalic, no throat congestion, moist mucosa. Hearing loss noted. Eyes:   PERRLA  Neck:   Supple  Chest:   Clear on auscultation. no rales or ronchi  Cardiac:  S1 S2 RR, no gallops, murmur or rubs. Abdomen: Soft, non-tender, no masses or organomegaly. Bowel sounds diminished. :   No suprapubic or flank tenderness. Neuro:  Unable to assess, no focal neurological deficit  SKIN:  No rashes, good skin turgor. Extremities:  No edema.     Medications:  Scheduled Medications:    lamoTRIgine  100 mg Oral BID    enoxaparin  40 mg SubCUTAneous BID    ampicillin-sulbactam  3,000 mg IntraVENous Q6H    sodium chloride flush  5-40 mL IntraVENous 2 times per day    sodium chloride flush  5-40 mL IntraVENous 2 times per day    levetiracetam  1,500 mg IntraVENous Q12H    pantoprazole  40 mg IntraVENous Daily    And    sodium chloride (PF)  10 mL IntraVENous Daily     Continuous Infusions:    sodium chloride      sodium chloride      sodium chloride 75 mL/hr at 22 0108     PRN Medicationslabetalol, 10 mg, Q6H PRN   Or  hydrALAZINE, 10 mg, Q6H PRN  LORazepam, 0.5 mg, Q4H PRN  bisacodyl, 10 mg, Daily PRN  albuterol, 2.5 mg, As Directed RT PRN  sodium chloride flush, 5-40 mL, PRN  sodium chloride, 25 mL, PRN  sodium chloride flush, 5-40 mL, PRN  sodium chloride, 25 mL, PRN  acetaminophen, 650 mg, Q6H PRN   Or  acetaminophen, 650 mg, Q6H PRN  potassium chloride, 40 mEq, PRN   Or  potassium alternative oral replacement, 40 mEq, PRN   Or  potassium chloride, 10 mEq, PRN  morphine, 1 mg, Q4H PRN  promethazine, 12.5 mg, Q6H PRN   Or  ondansetron, 4 mg, Q6H PRN        Diagnostic Labs:  CBC:   Recent Labs     02/22/22  0637 02/23/22  0541 02/24/22 0521   WBC 14.2* 8.8 6.7   RBC 3.88* 3.54* 3.83*   HGB 11.2* 10.1* 11.1*   HCT 38.7 34.2* 36.8   MCV 99.7 96.6 96.1   RDW 17.3* 17.0* 16.7*   * 493* 454*     BMP:   Recent Labs     02/22/22  0637 02/22/22  0637 02/23/22  0541 02/23/22 2050 02/24/22 0521     --  138  --  138   K 3.6*   < > 3.4* 3.9 3.7     --  103  --  101   CO2 23  --  21  --  22   BUN 8  --  7  --  7   CREATININE 0.35*  --  0.33*  --  0.35*    < > = values in this interval not displayed. BNP: No results for input(s): BNP in the last 72 hours. PT/INR: No results for input(s): PROTIME, INR in the last 72 hours. APTT: No results for input(s): APTT in the last 72 hours. CARDIAC ENZYMES: No results for input(s): CKMB, CKMBINDEX, TROPONINI in the last 72 hours. Invalid input(s): CKTOTAL;3  FASTING LIPID PANEL:  Lab Results   Component Value Date    CHOL 131 03/10/2021    HDL 33 (L) 03/10/2021    TRIG 147 03/10/2021     LIVER PROFILE:   No results for input(s): AST, ALT, ALB, BILIDIR, BILITOT, ALKPHOS in the last 72 hours. MICROBIOLOGY:   Lab Results   Component Value Date/Time    CULTURE NO GROWTH 3 DAYS 02/20/2022 11:20 AM       Imaging:    XR ABDOMEN (KUB) (SINGLE AP VIEW)    Result Date: 2/20/2022  Ileus. Probable gallstone. Catheter left abdomen and pelvis requires clinical correlation.      CT Head WO Contrast    Result Date: 2/20/2022  No acute intracranial abnormality. XR CHEST PORTABLE    Result Date: 2/20/2022  Chronic findings in the chest without acute airspace disease identified. CT CHEST PULMONARY EMBOLISM W CONTRAST    Result Date: 2/20/2022  1. No evidence for acute pulmonary embolism. 2.  Bilateral pulmonary nodules, thoracic lymphadenopathy, soft tissue calcifications in the chest and upper abdomen and sclerotic bone lesions again demonstrated, as described previously, which may be related to the patient's history of malignancy. 3.  Small pericardial effusion. Trace left pleural effusion. 4.  Mild septal thickening suggestive of interstitial edema. CT abdomen from 2/22/2022  Impression   1. Disease progression with increased size of bilateral pulmonary metastases,   slightly increased size of right retrocrural and left inguinal nannette   metastases, and possible slight increase in size of peritoneal metastases. Abdominal and pelvic nannette metastases and skeletal metastases appear   unchanged. 2. Persistent trace bilateral pleural effusions and small pericardial   effusion, likely malignant in etiology. 3. Worsened stricturing of the mid sigmoid colon with no obstruction at this   time.  This may be a sequela of prior treatment to the area.    4. The gastric tube has been retracted with tip now at the gastroesophageal   junction and the sideport not included.  Recommend advancement             ASSESSMENT & PLAN     ASSESSMENT / PLAN:     Principal Problem:    Dehydration  Active Problems:    Seizure ([address]    Type 2 diabetes mellitus without complication, without long-term current use of insulin (HCC)    Encephalopathy acute    Cancer, metastatic to bone (HCC)    Fatigue    Chronic deep vein thrombosis (DVT) of femoral vein of left lower extremity (HCC)    Ovarian cancer (HCC)    AMS (altered mental status)    Lactic acidosis    Ileus (HCC)    Elevated troponin    Pericardial effusion    Hypokalemia    Acute respiratory failure with hypoxia ([facility] [address]    Acute metabolic encephalopathy    Confusion    Urinary tract infection without hematuria    Seizure disorder (HCC)    Breakthrough seizure ([facility] [address]  Resolved Problems:    * No resolved hospital problems. *    IMPRESSION  This is a 60 y.o. female with a PMH of ovarian cancer with metastasis post chemotherapy/radiotherapy couple of days ago  presented with N/V, chills, fatigue, weakess and altered mental status. Patient also has elevated troponins for which cardiology is on board and new leukocytosis for which hematology is following. Patient admitted to inpatient status for further managament. 1. Dehydration:  Likely secondary to emesis. Continue 0.9% NACL infusion at 75 ml/hr. 2.  Ileus:   Keep NPO with IVF dehydration along with NG tube. Potassium replaced. 3. Seizures:   Resume her home Lamictal and Keppra. EEG ordered. Neurology following. Was on LTM EEG. No seizures recorded. MRI brain pending. Change in mentation likely due to being postictal.  We will continue to monitor. 4. Type 2 diabetes mellitus without complication, without long-term current use of insulin ([facility])    Currently blood glucose under acceptable levels. We will continue to monitor. 5. Leukocytosis:   Was likely secondary to UTI for which patient is on Unasyn. WBC count trended down. 6 . Elevated blood pressure  Likely due to pain and emesis. On IV labetalol and IV hydralazine every 6 hours as needed. 7.  Hypokalemia:   Likely cause of ileus. We will replace potassium. 8.  Acute respiratory failure with hypoxia with unclear etiology:   Likely secondary to pulmonary metastasis. She is currently saturating well above 92% on room air. Will continue to monitor. 9.  Lactic acidosis- resolved. 10.  Chronic DVT of femoral vein of lower extremity:   Full dose lovenox as patient is NPO  Will resume eliquis once tolerating oral feeds.     11.  Recurrent ovarian cancer with extensive metastasis  Chemotherapy using gemcitabine with stable CA-125 and with side effects abdominal pain nausea and vomiting. Heme-onc on board. Appreciate recommendations. Follow-up MRI brain to rule out metastasis to the brain.     DVT ppx: already on full dose lovenox  GI ppx: Protonix     PT/OT/SW: Consulted  Discharge Planning: Case management consulted. Inés Marcano MD  PGY-1, Internal Medicine Resident  R [address]ona 21 2/24/2022, 10:42 AM   Attending Physician Statement  I have discussed the care of Joe Cat, including pertinent history and exam findings,  with the resident. I have seen and examined the patient and the key elements of all parts of the encounter have been performed by me. I agree with the assessment, plan and orders as documented by the resident with additions . Treatment plan Discussed with nursing staff in detail , all questions answered . Electronically signed by Keisha Torres MD on   2/24/22 at 9:32 PM EST    Please note that this chart was generated using voice recognition Dragon dictation software. Although every effort was made to ensure the accuracy of this automated transcription, some errors in transcription may have occurred.

## 2022-02-24 NOTE — PROGRESS NOTES
Today's Date: 2/24/2022  Patient Name: Mesha Rose  Date of admission: 2/20/2022  9:53 AM  Patient's age: 59 y.o., 1965  Admission Dx: Dehydration [E86.0]  Confusion [R41.0]  Hypoxia [R09.02]  Fatigue, unspecified type [R53.83]  AMS (altered mental status) [R41.82]    Reason for Consult: management recommendations  Requesting Physician: Neftali Durán MD    CHIEF COMPLAINT: Nausea vomiting abdominal pain. History Obtained From:  patient, electronic medical record    Interval history:    Patient seen and examined  Labs and vitals reviewed  Mentation improved but still not back to baseline. Awaiting brain MRI. Patient remains confused  Again pulled out NG tube. HISTORY OF PRESENT ILLNESS:      The patient is a 59 y.o.  female who is admitted to the hospital for chief complains abdominal pain nausea vomiting which started yesterday. Patient's oncologic history is as below. Patient also recently had a DVT for which she has been started on Eliquis. Patient had a KUB done which shows findings of ileus. Patient last CT abdomen pelvis from 10/29. Patient is also on antiseizure medication. Patient is admitted with above complaints. Underwent placement of NG tube per IR. Patient is well-known to our practice. She has history of recurrent ovarian cancer. Patient is currently on cytotoxic therapy using Gemzar and has been tolerating it well. Patient last Ca1 25 was relatively stable. Patient CT chest from 2/20/2022 shows bilateral lung nodules thoracic adenopathy and sclerotic bony lesions      Past Medical History:   has a past medical history of Anemia, Bleeding, Cervical cancer ([address] Depression, Diabetes mellitus ([address] GERD (gastroesophageal reflux disease), Hx of blood clots, Hypertension, Metastatic cancer ([address] Ovarian cancer ([address] Post chemo evaluation, and Splenic lesion.     Past Surgical History:   has a past surgical history that includes Hysterectomy, total abdominal; Port Surgery; Tonsillectomy; IR PORT PLACEMENT > 5 YEARS (08/24/2020); Anus surgery; Abscess Drainage (2013); colectomy (03/2013); IR EMBOLIZATION HEMORRHAGE (10/05/2020); and Cardiac catheterization. Medications:    Prior to Admission medications    Medication Sig Start Date End Date Taking? Authorizing Provider   LORazepam (ATIVAN) 1 MG tablet Take 1 tablet by mouth every 8 hours as needed for Anxiety for up to 30 doses. 2/18/22 4/2/22  Adin Tristan MD   polyethylene glycol (MIRALAX) 17 g packet Take 17 g by mouth daily as needed for Constipation 2/18/22 3/20/22  Adin Tristan MD   lamoTRIgine (LAMICTAL) 25 MG tablet TAKE 3 TABS IN IN THE MORNING AND 3 TABS IN IN THE EVENING 2/9/22   Jr Fabian MD   levETIRAcetam (KEPPRA) 750 MG tablet Take 2 tablets by mouth 2 times daily 2/9/22   Gallo Mcgraw MD   diazePAM 5 MG/0.1ML LIQD 1 puff by Nasal route as needed (seizures)  Patient not taking: Reported on 2/18/2022 2/9/22   Gallo Mcgraw MD   morphine (MS CONTIN) 30 MG extended release tablet TAKE 1 TABLET BY MOUTH 2 TIMES DAILY FOR 30 DAYS. 1/27/22 2/26/22  Flor Bhatia MD   morphine (MS CONTIN) 15 MG extended release tablet TAKE 1 TABLET BY MOUTH 2 TIMES DAILY FOR 30 DAYS.  1/27/22 2/26/22  Adin Tristan MD   oxyCODONE-acetaminophen (PERCOCET) 5-325 MG per tablet TAKE 1 TABLET BY MOUTH EVERY 4 HOURS AS NEEDED FOR PAIN (BREAKTHROUGH PAIN) - REDUCE DOSES TAKEN AS PAIN BECOMES MANAGEABLE 1/27/22 2/26/22  Adin Tristan MD   pantoprazole (PROTONIX) 40 MG tablet Take 1 tablet by mouth daily 1/3/22   Adin Tristan MD   ondansetron (ZOFRAN-ODT) 4 MG disintegrating tablet Take 1 tablet by mouth every 8 hours as needed for Nausea or Vomiting 12/3/21   Adin Tristan MD   naloxegol (MOVANTIK) 12.5 MG TABS tablet Take 1 tablet by mouth every morning  Patient not taking: Reported on 2/18/2022 11/15/21   Adin Tristan MD   ferrous sulfate (IRON 325) 325 (65 Fe) MG tablet Take 1 tablet by mouth daily (with breakfast) 10/31/21   Anupam Solorzano MD   morphine (MSIR) 30 MG tablet Take 30 mg by mouth 2 times daily as needed for Pain.     Historical Provider, MD   sertraline (ZOLOFT) 100 MG tablet TAKE 1 TABLET BY MOUTH DAILY 10/4/21 2/18/22  Mel Oakes MD   ELIQUIS 5 MG TABS tablet Take 5 mg by mouth 2 times daily  5/26/21   Historical Provider, MD     Current Facility-Administered Medications   Medication Dose Route Frequency Provider Last Rate Last Admin    haloperidol lactate (HALDOL) injection 1 mg  1 mg IntraVENous Once PRN Ayla Fitzgerald MD        glucose (GLUTOSE) 40 % oral gel 15 g  15 g Oral PRN Ayla Fitzgerald MD        dextrose 50 % IV solution  12.5 g IntraVENous PRN Ayla Fitzgerald MD        glucagon (rDNA) injection 1 mg  1 mg IntraMUSCular PRN Ayla Fitzgerald MD        dextrose 5 % solution  100 mL/hr IntraVENous PRN Ayla Fitzgerald MD        nicotine (NICODERM CQ) 7 MG/24HR 1 patch  1 patch TransDERmal Daily Valerie Quiroga MD        labetalol (NORMODYNE;TRANDATE) injection 10 mg  10 mg IntraVENous Q6H PRN Vivi Moyer MD        Or    hydrALAZINE (APRESOLINE) injection 10 mg  10 mg IntraVENous Q6H PRN Vivi Moyer MD        LORazepam (ATIVAN) tablet 0.5 mg  0.5 mg Oral Q4H PRN Vivi Moyer MD   0.5 mg at 02/24/22 1652    lamoTRIgine (LAMICTAL) tablet 100 mg  100 mg Oral BID Zeinab Vivas MD   100 mg at 02/24/22 0001    bisacodyl (DULCOLAX) suppository 10 mg  10 mg Rectal Daily PRN Vivi Moyer MD        enoxaparin (LOVENOX) injection 40 mg  40 mg SubCUTAneous BID Vivi Moyer MD   40 mg at 02/24/22 0801    albuterol (PROVENTIL) nebulizer solution 2.5 mg  2.5 mg Nebulization As Directed RT PRN Vivi Moyer MD        ampicillin-sulbactam (UNASYN) 3000 mg ivpb minibag  3,000 mg IntraVENous Q6H Vivi Moyer MD   Stopped at 02/24/22 1430    sodium chloride flush 0.9 % injection 5-40 mL  5-40 mL IntraVENous 2 times per day Jaclyn Mccarthy Nova, DO   10 mL at 02/24/22 0800    sodium chloride flush 0.9 % injection 5-40 mL  5-40 mL IntraVENous PRN Marti Go, DO        0.9 % sodium chloride infusion  25 mL IntraVENous PRN Marti Go, DO        sodium chloride flush 0.9 % injection 5-40 mL  5-40 mL IntraVENous 2 times per day Mame Figueroa MD   10 mL at 02/24/22 0759    sodium chloride flush 0.9 % injection 5-40 mL  5-40 mL IntraVENous PRN Mame Figueroa MD   10 mL at 02/22/22 0918    0.9 % sodium chloride infusion  25 mL IntraVENous PRN Mame Figueroa MD        acetaminophen (TYLENOL) tablet 650 mg  650 mg Oral Q6H PRN Mame Figueroa MD   650 mg at 02/22/22 1625    Or    acetaminophen (TYLENOL) suppository 650 mg  650 mg Rectal Q6H PRN Mame Figueroa MD        0.9 % sodium chloride infusion   IntraVENous Continuous Mame Figueroa MD 75 mL/hr at 02/24/22 0108 New Bag at 02/24/22 0108    potassium chloride (KLOR-CON M) extended release tablet 40 mEq  40 mEq Oral PRN Mame Figueroa MD        Or    potassium bicarb-citric acid (EFFER-K) effervescent tablet 40 mEq  40 mEq Oral PRN Mame Figueroa MD        Or    potassium chloride 10 mEq/100 mL IVPB (Peripheral Line)  10 mEq IntraVENous PRN Mame Figueroa MD        levETIRAcetam (KEPPRA) 1500 mg/100 mL IVPB  1,500 mg IntraVENous Q12H Mame Figueroa MD   Stopped at 02/24/22 1035    morphine (PF) injection 1 mg  1 mg IntraVENous Q4H PRN Mame Figueroa MD   1 mg at 02/21/22 2225    pantoprazole (PROTONIX) injection 40 mg  40 mg IntraVENous Daily Mame Figueroa MD   40 mg at 02/24/22 0801    And    sodium chloride (PF) 0.9 % injection 10 mL  10 mL IntraVENous Daily Mame Figueroa MD   10 mL at 02/24/22 0759    promethazine (PHENERGAN) tablet 12.5 mg  12.5 mg Oral Q6H PRN Mame Figueroa MD   12.5 mg at 02/20/22 2305    Or    ondansetron (ZOFRAN) injection 4 mg  4 mg IntraVENous Q6H PRN Mame Figueroa MD   4 mg at 02/21/22 9423       Allergies:  Ceftriaxone    Social History:   reports that she has been smoking cigarettes.  She has been smoking about 1.00 pack per day. She uses smokeless tobacco. She reports previous alcohol use. She reports that she does not use drugs. Family History: family history includes Alcohol Abuse in her mother; Cirrhosis in her mother. REVIEW OF SYSTEMS:      Patient not able to give any meaningful history due to altered mental status    PHYSICAL EXAM:        /68   Pulse 77   Temp 98.4 °F (36.9 °C) (Oral)   Resp 16   Wt 168 lb (76.2 kg)   SpO2 98%   BMI 27.96 kg/m²    Temp (24hrs), Av.3 °F (36.8 °C), Min:97.8 °F (36.6 °C), Max:98.7 °F (37.1 °C)      General appearance -sick appearing, no in pain or distress   Mental status -confused  Eyes - pupils equal and reactive, extraocular eye movements intact   Ears - bilateral TM's and external ear canals normal   Mouth - mucous membranes moist, pharynx normal without lesions. NG tube in place  Neck - supple, no significant adenopathy   Lymphatics - no palpable lymphadenopathy, no hepatosplenomegaly   Chest - clear to auscultation, no wheezes, rales or rhonchi, symmetric air entry   Heart - normal rate, regular rhythm, normal S1, S2, no murmurs  Abdomen - soft, nontender, nondistended, no masses or organomegaly   Neurological -confused and not following commands  Musculoskeletal - no joint tenderness, deformity or swelling   Extremities - peripheral pulses normal, no pedal edema, no clubbing or cyanosis   Skin - normal coloration and turgor, no rashes, no suspicious skin lesions noted ,      DATA:      Labs:     Results for orders placed or performed during the hospital encounter of 22   Culture, Urine    Specimen: Urine, clean catch   Result Value Ref Range    Specimen Description . CLEAN CATCH URINE     Culture ESCHERICHIA COLI 10 to 50,000 CFU/ML        Susceptibility    Escherichia coli - BACTERIAL SUSCEPTIBILITY PANEL TAQUERIA     ampicillin <=2 Sensitive      aztreonam <=1 Sensitive      ceFAZolin* <=4 Sensitive       * Cefazolin sensitivity results can be used to predict the effectiveness of oral cephalosporins (eg. Cephalexin) in uncomplicated Urinary Tract Infections due to E. coli, K. pneumoniae, and P. mirabilis     cefTRIAXone <=1 Sensitive      ciprofloxacin <=0.25 Sensitive      Confirmatory Extended Spectrum Beta-Lactamase NEGATIVE Negative      gentamicin <=1 Sensitive      nitrofurantoin <=16 Sensitive      tobramycin <=1 Sensitive      trimethoprim-sulfamethoxazole <=20 Sensitive      piperacillin-tazobactam <=4 Sensitive    Culture, Blood 2    Specimen: Blood   Result Value Ref Range    Specimen Description . BLOOD     Special Requests L HAND 2ML     Culture NO GROWTH 4 DAYS    Culture, Blood 1    Specimen: Blood   Result Value Ref Range    Specimen Description . BLOOD     Special Requests R HAND 20ML     Culture NO GROWTH 4 DAYS    CBC with Auto Differential   Result Value Ref Range    WBC 20.0 (H) 3.5 - 11.3 k/uL    RBC 4.08 3.95 - 5.11 m/uL    Hemoglobin 11.9 11.9 - 15.1 g/dL    Hematocrit 39.5 36.3 - 47.1 %    MCV 96.8 82.6 - 102.9 fL    MCH 29.2 25.2 - 33.5 pg    MCHC 30.1 28.4 - 34.8 g/dL    RDW 17.7 (H) 11.8 - 14.4 %    Platelets 112 (H) 481 - 453 k/uL    MPV 9.3 8.1 - 13.5 fL    NRBC Automated 0.0 0.0 per 100 WBC    Differential Type NOT REPORTED     WBC Morphology NOT REPORTED     RBC Morphology NOT REPORTED     Platelet Estimate NOT REPORTED     Immature Granulocytes 0 0 %    Seg Neutrophils 88 (H) 36 - 66 %    Lymphocytes 1 (L) 24 - 44 %    Monocytes 11 (H) 1 - 7 %    Eosinophils % 0 (L) 1 - 4 %    Basophils 0 0 - 2 %    Absolute Immature Granulocyte 0.00 0.00 - 0.30 k/uL    Segs Absolute 17.60 (H) 1.8 - 7.7 k/uL    Absolute Lymph # 0.20 (L) 1.0 - 4.8 k/uL    Absolute Mono # 2.20 (H) 0.1 - 0.8 k/uL    Absolute Eos # 0.00 0.0 - 0.4 k/uL    Basophils Absolute 0.00 0.0 - 0.2 k/uL    Morphology ANISOCYTOSIS PRESENT    Comprehensive Metabolic Panel w/ Reflex to MG   Result Value Ref Range    Glucose 150 (H) 70 - 99 mg/dL    BUN 13 6 - 20 mg/dL    CREATININE 0.47 (L) 0.50 - 0.90 mg/dL    Bun/Cre Ratio NOT REPORTED 9 - 20    Calcium 9.0 8.6 - 10.4 mg/dL    Sodium 142 135 - 144 mmol/L    Potassium 3.4 (L) 3.7 - 5.3 mmol/L    Chloride 103 98 - 107 mmol/L    CO2 22 20 - 31 mmol/L    Anion Gap 17 9 - 17 mmol/L    Alkaline Phosphatase 65 35 - 104 U/L    ALT 7 5 - 33 U/L    AST 12 <32 U/L    Total Bilirubin 0.16 (L) 0.3 - 1.2 mg/dL    Total Protein 6.7 6.4 - 8.3 g/dL    Albumin 4.2 3.5 - 5.2 g/dL    Albumin/Globulin Ratio 1.7 1.0 - 2.5    GFR Non-African American >60 >60 mL/min    GFR African American >60 >60 mL/min    GFR Comment          GFR Staging NOT REPORTED    Lipase   Result Value Ref Range    Lipase 13 13 - 60 U/L   Troponin   Result Value Ref Range    Troponin, High Sensitivity 51 (H) 0 - 14 ng/L    Troponin T NOT REPORTED <0.03 ng/mL    Troponin Interp NOT REPORTED    Urinalysis with Microscopic   Result Value Ref Range    Color, UA Yellow Yellow    Turbidity UA Turbid (A) Clear    Glucose, Ur 2+ (A) NEGATIVE    Bilirubin Urine NEGATIVE NEGATIVE    Ketones, Urine NEGATIVE NEGATIVE    Specific Gravity, UA 1.017 1.005 - 1.030    Urine Hgb NEGATIVE NEGATIVE    pH, UA 5.5 5.0 - 8.0    Protein, UA NEGATIVE NEGATIVE    Urobilinogen, Urine Normal Normal    Nitrite, Urine NEGATIVE NEGATIVE    Leukocyte Esterase, Urine NEGATIVE NEGATIVE    -          WBC, UA None 0 - 5 /HPF    RBC, UA 0 TO 2 0 - 4 /HPF    Casts UA NOT REPORTED 0 - 8 /LPF    Crystals, UA NOT REPORTED None /HPF    Epithelial Cells UA 0 TO 2 0 - 5 /HPF    Renal Epithelial, UA NOT REPORTED 0 /HPF    Bacteria, UA NOT REPORTED None    Mucus, UA NOT REPORTED None    Trichomonas, UA NOT REPORTED None    Amorphous, UA NOT REPORTED None    Other Observations UA NOT REPORTED NOT REQ.     Yeast, UA NOT REPORTED None   Lactate, Sepsis   Result Value Ref Range    Lactic Acid, Sepsis NOT REPORTED 0.5 - 1.9 mmol/L    Lactic Acid, Sepsis, Whole Blood 2.8 (H) 0.5 - 1.9 mmol/L   Lactate, Sepsis   Result Value Ref Range    Lactic Acid, Sepsis NOT REPORTED 0.5 - 1.9 mmol/L    Lactic Acid, Sepsis, Whole Blood 1.5 0.5 - 1.9 mmol/L   Lamotrigine Level   Result Value Ref Range    Lamotrigine Lvl 1.3 (L) 3.0 - 15.0 ug/mL   Levetiracetam Level   Result Value Ref Range    Levetiracetam Lvl 22 ug/mL   Troponin   Result Value Ref Range    Troponin, High Sensitivity 57 (HH) 0 - 14 ng/L    Troponin T NOT REPORTED <0.03 ng/mL    Troponin Interp NOT REPORTED    Magnesium   Result Value Ref Range    Magnesium 1.8 1.6 - 2.6 mg/dL   Brain Natriuretic Peptide   Result Value Ref Range    Pro- (H) <300 pg/mL    BNP Interpretation NOT REPORTED    Basic Metabolic Panel w/ Reflex to MG   Result Value Ref Range    Glucose 109 (H) 70 - 99 mg/dL    BUN 8 6 - 20 mg/dL    CREATININE 0.39 (L) 0.50 - 0.90 mg/dL    Bun/Cre Ratio NOT REPORTED 9 - 20    Calcium 8.9 8.6 - 10.4 mg/dL    Sodium 137 135 - 144 mmol/L    Potassium 3.6 (L) 3.7 - 5.3 mmol/L    Chloride 103 98 - 107 mmol/L    CO2 22 20 - 31 mmol/L    Anion Gap 12 9 - 17 mmol/L    GFR Non-African American >60 >60 mL/min    GFR African American >60 >60 mL/min    GFR Comment          GFR Staging NOT REPORTED    CBC with Auto Differential   Result Value Ref Range    WBC 14.1 (H) 3.5 - 11.3 k/uL    RBC 3.56 (L) 3.95 - 5.11 m/uL    Hemoglobin 10.5 (L) 11.9 - 15.1 g/dL    Hematocrit 33.7 (L) 36.3 - 47.1 %    MCV 94.7 82.6 - 102.9 fL    MCH 29.5 25.2 - 33.5 pg    MCHC 31.2 28.4 - 34.8 g/dL    RDW 17.4 (H) 11.8 - 14.4 %    Platelets 885 (H) 911 - 453 k/uL    MPV 9.4 8.1 - 13.5 fL    NRBC Automated 0.0 0.0 per 100 WBC    Differential Type NOT REPORTED     WBC Morphology NOT REPORTED     RBC Morphology ANISOCYTOSIS PRESENT     Platelet Estimate NOT REPORTED     Seg Neutrophils 91 (H) 36 - 65 %    Lymphocytes 6 (L) 24 - 43 %    Monocytes 3 3 - 12 %    Eosinophils % 0 (L) 1 - 4 %    Basophils 0 0 - 2 %    Immature Granulocytes 1 (H) 0 %    Segs Absolute 12.77 (H) 1.50 - 8.10 k/uL    Absolute Lymph # 0.78 (L) 1.10 - 3.70 k/uL    Absolute Mono # 0.38 0.10 - 1.20 k/uL    Absolute Eos # <0.03 0.00 - 0.44 k/uL    Basophils Absolute 0.05 0.00 - 0.20 k/uL    Absolute Immature Granulocyte 0.08 0.00 - 0.30 k/uL   Lamotrigine Level   Result Value Ref Range    Lamotrigine Lvl 1.3 (L) 3.0 - 15.0 ug/mL   Levetiracetam Level   Result Value Ref Range    Levetiracetam Lvl 4 ug/mL   DRUG SCREEN MULTI URINE   Result Value Ref Range    Amphetamine Screen, Ur NEGATIVE NEGATIVE    Barbiturate Screen, Ur NEGATIVE NEGATIVE    Benzodiazepine Screen, Urine NEGATIVE NEGATIVE    Cocaine Metabolite, Urine NEGATIVE NEGATIVE    Methadone Screen, Urine NEGATIVE NEGATIVE    Opiates, Urine POSITIVE (A) NEGATIVE    Phencyclidine, Urine NEGATIVE NEGATIVE    Propoxyphene, Urine NOT REPORTED NEGATIVE    Cannabinoid Scrn, Ur NEGATIVE NEGATIVE    Oxycodone Screen, Ur NEGATIVE NEGATIVE    Methamphetamine, Urine NOT REPORTED NEGATIVE    Tricyclic Antidepressants, Urine NOT REPORTED NEGATIVE    MDMA, Urine NOT REPORTED NEGATIVE    Buprenorphine Urine NOT REPORTED NEGATIVE    Test Information       Assay provides medical screening only. The absence of expected drug(s) and/or metabolite(s) may indicate diluted or adulterated urine, limitations of testing or timing of collection.    Troponin   Result Value Ref Range    Troponin, High Sensitivity 17 (H) 0 - 14 ng/L    Troponin T NOT REPORTED <0.03 ng/mL    Troponin Interp NOT REPORTED    Troponin   Result Value Ref Range    Troponin, High Sensitivity 14 0 - 14 ng/L    Troponin T NOT REPORTED <0.03 ng/mL    Troponin Interp NOT REPORTED    Prolactin   Result Value Ref Range    Prolactin 4.52 (L) 4.79 - 23.30 ug/L   Procalcitonin   Result Value Ref Range    Procalcitonin 0.25 (H) <0.09 ng/mL   Troponin   Result Value Ref Range    Troponin, High Sensitivity 16 (H) 0 - 14 ng/L    Troponin T NOT REPORTED <0.03 ng/mL    Troponin Interp NOT REPORTED    CK   Result Value Ref Range    Total  26 - 192 U/L   Myoglobin, Serum   Result Value Ref Range    Myoglobin 62 (H) 25 - 58 ng/mL   Basic Metabolic Panel w/ Reflex to MG   Result Value Ref Range    Glucose 103 (H) 70 - 99 mg/dL    BUN 8 6 - 20 mg/dL    CREATININE 0.35 (L) 0.50 - 0.90 mg/dL    Calcium 9.1 8.6 - 10.4 mg/dL    Sodium 137 135 - 144 mmol/L    Potassium 3.6 (L) 3.7 - 5.3 mmol/L    Chloride 100 98 - 107 mmol/L    CO2 23 20 - 31 mmol/L    Anion Gap 14 9 - 17 mmol/L    GFR Non-African American >60 >60 mL/min    GFR African American >60 >60 mL/min    GFR Comment         CBC with Auto Differential   Result Value Ref Range    WBC 14.2 (H) 3.5 - 11.3 k/uL    RBC 3.88 (L) 3.95 - 5.11 m/uL    Hemoglobin 11.2 (L) 11.9 - 15.1 g/dL    Hematocrit 38.7 36.3 - 47.1 %    MCV 99.7 82.6 - 102.9 fL    MCH 28.9 25.2 - 33.5 pg    MCHC 28.9 28.4 - 34.8 g/dL    RDW 17.3 (H) 11.8 - 14.4 %    Platelets 392 (H) 631 - 453 k/uL    MPV 9.6 8.1 - 13.5 fL    NRBC Automated 0.0 0.0 per 100 WBC    RBC Morphology ANISOCYTOSIS PRESENT     Seg Neutrophils 91 (H) 36 - 65 %    Lymphocytes 7 (L) 24 - 43 %    Monocytes 1 (L) 3 - 12 %    Eosinophils % 0 (L) 1 - 4 %    Basophils 1 0 - 2 %    Immature Granulocytes 1 (H) 0 %    Segs Absolute 13.01 (H) 1.50 - 8.10 k/uL    Absolute Lymph # 0.92 (L) 1.10 - 3.70 k/uL    Absolute Mono # 0.15 0.10 - 1.20 k/uL    Absolute Eos # <0.03 0.00 - 0.44 k/uL    Basophils Absolute 0.07 0.00 - 0.20 k/uL    Absolute Immature Granulocyte 0.07 0.00 - 0.30 k/uL   Ferritin   Result Value Ref Range    Ferritin 202 (H) 13 - 150 ug/L   Iron and TIBC   Result Value Ref Range    Iron 46 37 - 145 ug/dL    TIBC 256 250 - 450 ug/dL    Iron Saturation 18 (L) 20 - 55 %    UIBC 210 112 - 347 ug/dL   Vitamin B12 & Folate   Result Value Ref Range    Vitamin B-12 313 232 - 1245 pg/mL    Folate 14.4 >4.8 ng/mL   Magnesium   Result Value Ref Range    Magnesium 1.7 1.6 - 2.6 mg/dL   Basic Metabolic Panel w/ Reflex to MG   Result Value Ref Range    Glucose 89 70 - 99 mg/dL    BUN 7 6 >60 >60 mL/min    GFR Comment         CBC with Auto Differential   Result Value Ref Range    WBC 6.7 3.5 - 11.3 k/uL    RBC 3.83 (L) 3.95 - 5.11 m/uL    Hemoglobin 11.1 (L) 11.9 - 15.1 g/dL    Hematocrit 36.8 36.3 - 47.1 %    MCV 96.1 82.6 - 102.9 fL    MCH 29.0 25.2 - 33.5 pg    MCHC 30.2 28.4 - 34.8 g/dL    RDW 16.7 (H) 11.8 - 14.4 %    Platelets 515 (H) 354 - 453 k/uL    MPV 9.7 8.1 - 13.5 fL    NRBC Automated 0.3 (H) 0.0 per 100 WBC    Seg Neutrophils 74 (H) 36 - 65 %    Lymphocytes 19 (L) 24 - 43 %    Monocytes 5 3 - 12 %    Eosinophils % 1 1 - 4 %    Basophils 1 0 - 2 %    Immature Granulocytes 0 0 %    Segs Absolute 4.98 1.50 - 8.10 k/uL    Absolute Lymph # 1.29 1.10 - 3.70 k/uL    Absolute Mono # 0.32 0.10 - 1.20 k/uL    Absolute Eos # 0.04 0.00 - 0.44 k/uL    Basophils Absolute 0.08 0.00 - 0.20 k/uL    Absolute Immature Granulocyte <0.03 0.00 - 0.30 k/uL    RBC Morphology ANISOCYTOSIS PRESENT    POC Glucose Fingerstick   Result Value Ref Range    POC Glucose 173 (H) 65 - 105 mg/dL   POC Glucose Fingerstick   Result Value Ref Range    POC Glucose 79 65 - 105 mg/dL   POC Glucose Fingerstick   Result Value Ref Range    POC Glucose 71 65 - 105 mg/dL   POC Glucose Fingerstick   Result Value Ref Range    POC Glucose 74 65 - 105 mg/dL   EKG 12 Lead   Result Value Ref Range    Ventricular Rate 90 BPM    Atrial Rate 90 BPM    P-R Interval 162 ms    QRS Duration 90 ms    Q-T Interval 386 ms    QTc Calculation (Bazett) 472 ms    P Axis 49 degrees    R Axis 87 degrees    T Axis 55 degrees   EKG 12 Lead   Result Value Ref Range    Ventricular Rate 99 BPM    Atrial Rate 99 BPM    P-R Interval 172 ms    QRS Duration 92 ms    Q-T Interval 364 ms    QTc Calculation (Bazett) 467 ms    P Axis 45 degrees    R Axis -5 degrees    T Axis 36 degrees   EKG 12 Lead   Result Value Ref Range    Ventricular Rate 64 BPM    Atrial Rate 64 BPM    P-R Interval 152 ms    QRS Duration 88 ms    Q-T Interval 454 ms    QTc Calculation (Bazett) 468 ms    P Axis 42 degrees    R Axis 28 degrees    T Axis 46 degrees         IMAGING DATA:    XR ABDOMEN (KUB) (SINGLE AP VIEW)    Result Date: 2/20/2022  EXAMINATION: ONE SUPINE XRAY VIEW(S) OF THE ABDOMEN 2/20/2022 11:18 pm COMPARISON: January 4, 2021 HISTORY: ORDERING SYSTEM PROVIDED HISTORY: rule out ileus TECHNOLOGIST PROVIDED HISTORY: rule out ileus Reason for Exam: supine,nausea,vomiting FINDINGS: 37 mm calcified lesion right upper quadrant may represent a gallstone. New metallic coils noted in the left upper quadrant. A catheter is noted extending from the left upper quadrant to the pelvis and appears unchanged. Gas-filled loops of small large bowel. Increased stool. Osseous structures normal.  Intravenous contrast is noted in the bladder. Ileus. Probable gallstone. Catheter left abdomen and pelvis requires clinical correlation. CT Head WO Contrast    Result Date: 2/20/2022  EXAMINATION: CT OF THE HEAD WITHOUT CONTRAST  2/20/2022 1:44 pm TECHNIQUE: CT of the head was performed without the administration of intravenous contrast. Dose modulation, iterative reconstruction, and/or weight based adjustment of the mA/kV was utilized to reduce the radiation dose to as low as reasonably achievable. COMPARISON: 06/18/2021, MRI 06/18/2021 HISTORY: ORDERING SYSTEM PROVIDED HISTORY:  [employer] for mets TECHNOLOGIST PROVIDED HISTORY: [employer] for mets Decision Support Exception - unselect if not a suspected or confirmed emergency medical condition->Emergency Medical Condition (MA) Reason for Exam:  Eval for mets FINDINGS: BRAIN/VENTRICLES: There is no acute intracranial hemorrhage, mass effect or midline shift. No abnormal extra-axial fluid collection. The gray-white differentiation is maintained without evidence of an acute infarct. There is no evidence of hydrocephalus. Subtle low attenuation in the deep white matter which is nonspecific likely due to chronic small vessel ischemia.   Overall appearance is similar to the prior study. CT with contrast or MRI would be more sensitive to evaluate for metastatic disease. ORBITS: The visualized portion of the orbits demonstrate no acute abnormality. SINUSES: The visualized paranasal sinuses demonstrate no acute abnormality. Tiny retention cyst versus focal mucosal thickening left maxillary sinus. Minimal fluid/opacification left mastoid air cells. SOFT TISSUES/SKULL:  No acute abnormality of the visualized skull or soft tissues. Similar small right temporal flat skin lesion. No acute intracranial abnormality. XR CHEST PORTABLE    Result Date: 2/20/2022  EXAMINATION: ONE XRAY VIEW OF THE CHEST 2/20/2022 11:51 am COMPARISON: 06/22/2021, 06/21/2021 HISTORY: ORDERING SYSTEM PROVIDED HISTORY: eval for pmn TECHNOLOGIST PROVIDED HISTORY: eval for pmn FINDINGS: The cardiomediastinal silhouette is unchanged in appearance. Right internal jugular port in place. Generalized interstitial prominence again noted. There is no consolidation, pneumothorax, or evidence of edema. No effusion is appreciated. The osseous structures are unchanged in appearance. Vascular coil pack in the left upper quadrant. Chronic findings in the chest without acute airspace disease identified. CT CHEST PULMONARY EMBOLISM W CONTRAST    Result Date: 2/20/2022  EXAMINATION: CTA OF THE CHEST 2/20/2022 1:48 pm TECHNIQUE: CTA of the chest was performed after the administration of intravenous contrast.  Multiplanar reformatted images are provided for review. MIP images are provided for review. Dose modulation, iterative reconstruction, and/or weight based adjustment of the mA/kV was utilized to reduce the radiation dose to as low as reasonably achievable. COMPARISON: CT chest 03/09/2021. CT abdomen and pelvis 10/29/2021.  HISTORY: ORDERING SYSTEM PROVIDED HISTORY: eval for pe TECHNOLOGIST PROVIDED HISTORY: eval for pe Decision Support Exception - unselect if not a suspected or confirmed emergency medical condition->Emergency Medical Condition (MA) Reason for Exam: eval for pe FINDINGS: Pulmonary Arteries: Pulmonary arteries are adequately opacified for evaluation. No evidence of intraluminal filling defect to suggest pulmonary embolism. Main pulmonary artery is normal in caliber. Mediastinum: Mildly enlarged confluent hilar lymph nodes and lymphatic tissue in the AP window and subcarinal regions again demonstrated. Small pericardial effusion, similar in appearance. There is no acute abnormality of the thoracic aorta. Right internal jugular port in place. Lungs/pleura: Respiratory motion artifact noted. Mild septal thickening. Persistent nodular opacity in the left lower lobe on axial image 63 measuring up to 2.6 cm. Scattered ground-glass nodules are again demonstrated bilaterally. The amount of left pleural fluid has diminished in the interval with trace amount remaining. Soft tissue calcification near the supra [place] IVC again demonstrated. Upper Abdomen: Coarse calcification near the medial and inferior aspect of the spleen partially visualized, as seen previously. Dense metallic artifact corresponding to metallic coil pack near the spleen again demonstrated. Cholelithiasis. Soft Tissues/Bones: Blastic metastatic deposits predominantly in the lower thoracic and upper lumbar spine again demonstrated. 1.  No evidence for acute pulmonary embolism. 2.  Bilateral pulmonary nodules, thoracic lymphadenopathy, soft tissue calcifications in the chest and upper abdomen and sclerotic bone lesions again demonstrated, as described previously, which may be related to the patient's history of malignancy. 3.  Small pericardial effusion. Trace left pleural effusion. 4.  Mild septal thickening suggestive of interstitial edema.      IR PLACE NG TUBE BY DR Kirstie Ambrose    Result Date: 2/21/2022  PROCEDURE: XR PLACE NASOGASTRIC TUBE PHYS 2/21/2022 HISTORY: ORDERING SYSTEM PROVIDED HISTORY: ileus TECHNOLOGIST PROVIDED HISTORY: ileus Ileus CONTRAST: None SEDATION: None FLUOROSCOPY DOSE AND TYPE OR TIME AND EXPOSURES: Fluoro time 0.7 minutes  cGy cm 2 DESCRIPTION OF PROCEDURE: This procedure was performed by Rah DAVID under indirect supervision of . [place] protocol was observed. An attempt was made to pass a 12 [place] sump through right and left nostrils without success due to stricture in the nasal cavity. Under fluoroscopic guidance, through the left nostril, a 12 [place] sump nasogastric tube was negotiated into the stomach. With catheter manipulation, the tip of the catheter was manipulated into the distal stomach/proximal duodenum. Air bolus administration confirmed satisfactory tip position. The catheter was secured appropriately at 74 cm. Estimated blood loss was 0 mL. The patient tolerated the procedure well. Successful placement of nasogastric tube. Okay to use.          IMPRESSION:   Primary Problem  Dehydration    Active Hospital Problems    Diagnosis Date Noted    Acute metabolic encephalopathy [N76.24]     Confusion [R41.0]     Urinary tract infection without hematuria [N39.0]     Seizure disorder ([address] [G40.909]     Breakthrough seizure ([address] [G40.919]     Lactic acidosis [E87.2] 02/21/2022    Ileus ([address] [K56.7] 02/21/2022    Elevated troponin [R77.8] 02/21/2022    Pericardial effusion [I31.3] 02/21/2022    Hypokalemia [E87.6] 02/21/2022    Acute respiratory failure with hypoxia (HCC) [J96.01] 02/21/2022    Dehydration [E86.0] 02/20/2022    AMS (altered mental status) [R41.82] 02/20/2022    Ovarian cancer ([address] [C56.9] 05/29/2021    Chronic deep vein thrombosis (DVT) of femoral vein of left lower extremity ([address] [I82.512] 03/11/2021    Fatigue [R53.83]     Cancer, metastatic to bone ([address] [C79.51] 01/28/2021    Encephalopathy acute [G93.40]     Type 2 diabetes mellitus without complication, without long-term current use of insulin ([address] [E11.9]     Seizure ([facility] [address] [R56.9] 10/21/2020       Recurrent ovarian cancer chemotherapy using gemcitabine with stable CA-125  Abdominal pain nausea vomiting  Ileus per KUB  Seizure disorder  DVT of femoral vein on anticoagulation using Eliquis      RECOMMENDATIONS:  1. I personally reviewed results of lab work-up imaging studies and other relevant clinical data. 2. Mentation improved but still not back to baseline. I did not discuss results of CT scan finding which showed disease progression I do not believe patient will be able to comprehend on the results at this point. Patient daughter was updated on the CT scan findings  3. Continue symptomatic supportive care  4. Follow-up on brain MRI results. 5. No plans for chemotherapy in house  6. We will follow. Discussed with patient and Nurse. Thank you for asking us to see this patient. Amanda Newberry MD          This note is created with the assistance of a speech recognition program.  While intending to generate a document that actually reflects the content of the visit, the document can still have some errors including those of syntax and sound a like substitutions which may escape proof reading. It such instances, actual meaning can be extrapolated by contextual diversion.

## 2022-02-24 NOTE — PROGRESS NOTES
Speech Language Pathology  Facility/Department: [facility]   CLINICAL BEDSIDE SWALLOW EVALUATION    NAME: Flaco Castañeda [facility]  : 1965  MRN: [id number]    ADMISSION DATE: 2022  ADMITTING DIAGNOSIS: has Malignant neoplasm of ovary ([address] Seizure ([address] Type 2 diabetes mellitus without complication, without long-term current use of insulin ([address] Anemia; Splenic abscess; 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 [facility]); Acute encephalopathy; PRES (posterior reversible encephalopathy syndrome); Hemianopia, bitemporal; Encephalopathy acute; Status epilepticus ([address] History of ovarian cancer; Pleural effusion; Bandemia; Cancer, metastatic to bone [facility]); Intractable low back pain; Fatigue; Chronic deep vein thrombosis (DVT) of femoral vein of left lower extremity ([address] Iron deficiency anemia secondary to inadequate dietary iron intake; Iron malabsorption; Ovarian cancer ([address] Thrombocytosis; History of deep venous thrombosis (DVT) of distal vein of left lower extremity: On Eliquis; Pelvic mass; Acute on chronic anemia; Dehydration; AMS (altered mental status); Lactic acidosis; Anxiety; 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 ([address] Diabetes mellitus ([address] Gastroesophageal reflux disease; Recurrent major depressive disorder, in partial remission ([address] Ileus ([address] Elevated troponin; Pericardial effusion; Hypokalemia; Acute respiratory failure with hypoxia ([address] Acute metabolic encephalopathy; Confusion; Urinary tract infection without hematuria; Seizure disorder ([address] and Breakthrough seizure ([address] on their problem list.    Recent Chest Xray/CT of Chest:   No acute intracranial abnormality. Current Diet level:   NPO    Primary Complaint    The patient is a 60 y.o. female with significant past medical history of ovarian cancer with metastasis s/p chemo and radiation therapy, seizure disorder on Lamictal and Keppra who presents with nausea, vomiting and AMS.       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 She had surgical debulking and she had systemic treatment with Taxol and carboplatin for 6 cycles. Patient was in remission for about 2 years. She had a relapse in 2007 and treated with topotecan with good results. Patient relapsed again in 2008 and was treated with Taxol carboplatin. After 3 cycles of systemic chemotherapy she had problems with carboplatin so she finished 3 more cycles with Taxol. She did well again for 2 to 3 years until she had a relapse in 2012 and she had intraperitoneal chemotherapy treatment with Taxol. Patient was last seen by her oncologist in 2014 at which time she had relatively stable disease with normal tumor marker and no significant abnormal images. Patient moved to [place] after that and she was lost for oncology follow-ups. Patient states that she came back to [place], [place] 3 years ago and has been following with an oncologist since. Patient states her last chemo therapy was last week on February 18th, 2022. Re imaging confirmed metastatic relapse. Biopsy confirmed ovarian cancer recurrence. Scan showed extensive intraabdominal and splenic involvement and lung mets. CT brain was unremarkable on 2/20/22. Pain:  Pain Assessment  Pain Assessment: 0-10  Pain Level: 0    Reason for Referral  Petr Rivera was referred for a bedside swallow evaluation to assess the efficiency of her swallow function, identify signs and symptoms of aspiration and make recommendations regarding safe dietary consistencies, effective compensatory strategies, and safe eating environment. Impression      Patient presents with probable safe swallow for Dysphagia Pureed (Dysphagia I) with thin liquids as evidenced by no overt s/s of aspiration noted with consistencies tested. Note pt w/ confusion. Per daughter, this typically lasts several days following seizure. Pt. With decreased bolus/safety awareness with solids. Pt expectorated soft solid bolus. Recommend small sips and bites, only feed when alert and awake and upright at 90 degrees for all PO intake. Recommend close monitoring for overt/clinical s/s of aspiration and D/C PO intake and complete Modified Barium Swallow Study should they occur. ST to reassess for potential advancement of solids a BS as appropriate. Results and recommendations reported to RN. Dysphagia Outcome Severity Scale: Level 5: Mild dysphagia- Distant supervision. May need one diet consistency restricted     Treatment Plan  Requires SLP Intervention: Yes  Duration/Frequency of Treatment: 3-5x weekly  D/C Recommendations: Further therapy recommended at discharge. Recommended Diet and Intervention  Diet Solids Recommendation: Dysphagia Pureed (Dysphagia I)  Liquid Consistency Recommendation: Thin     Recommendations: Assist feed  Therapeutic Interventions: Diet tolerance monitoring      Treatment/Goals  Dysphagia Goals: The patient will tolerate recommended diet without observed clinical signs of aspiration    General  Chart Reviewed: Yes           Vision/Hearing  Vision  Vision: Within Functional Limits  Hearing  Hearing: Within functional limits    Oral Motor Deficits  Oral/Motor  Oral Motor: Within functional limits    Oral Phase Dysfunction  Oral Phase  Oral Phase: WFL  Oral Phase  Oral Phase - Comment: Extended mastication and oral manipulation. Pt voluntarily expectorates soft solid bolus. Pt initially attempts to drink dry solid. Pt demonstrates decreased bolus awareness/ awareness of consistencies. Pt w/ decreased saftey awareness w/ solids . Indicators of Pharyngeal Phase Dysfunction   Pharyngeal Phase   Pharyngeal: No overt s/s of aspiration noted w/ consistencies tested.     Prognosis  Prognosis  Prognosis for safe diet advancement: fair  Individuals consulted  Consulted and agree with results and recommendations: Patient    Education  Patient Education: yes  Patient Education Response: Verbalizes understanding             Therapy Time  SLP Individual Minutes  Time In: 0130  Time Out: 1660  Minutes: 20        Completed by: Flavio Cosby  Clinician    Angelita MALDONADO S.CCC/SLP

## 2022-02-24 NOTE — PROGRESS NOTES
[facility]   [facility]    Progress Note             Date:   2/24/2022  Patient name:  Martha Zuniga  Date of admission:  2/20/2022  9:53 AM  MRN:   [id number]  Account:  [de-identified]  YOB: 1965  PCP:    Kaila George MD  Room:   4424/1547-85  Code Status:    Full Code    Chief Complaint:     Chief Complaint   Patient presents with    Altered Mental Status    Nausea & Vomiting     Interval hx: The patient was seen and examined at bedside. Is vitally stable, alert and oriented x 0. Still having auditory hallucinations  Mri could not be done because patient was agitated   No event or seizure recorded but demonstrated diffuse slwoing waves suggesting mild encephalopathy   Patient on keppra 1500 bid , lamictal 100 bid       Brief History of Present Illness: The patient is a 60 y.o. female with significant past medical history of ovarian cancer with metastasis s/p chemo and radiation therapy, seizure disorder on Lamictal and Keppra who presents with nausea, vomiting and AMS.      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  She had surgical debulking and she had systemic treatment with Taxol and carboplatin for 6 cycles.  Patient was in remission for about 2 years. Cesar Werner had a relapse in 2007 and treated with topotecan with good results.  Patient relapsed again in 2008 and was treated with Taxol carboplatin.  After 3 cycles of systemic chemotherapy she had problems with carboplatin so she finished 3 more cycles with Taxol.  She did well again for 2 to 3 years until she had a relapse in 2012 and she had intraperitoneal chemotherapy treatment with Taxol.  Patient was last seen by her oncologist in 2014 at which time she had relatively stable disease with normal tumor marker and no significant abnormal images.  Patient moved to [place] after that and she was lost for oncology follow-ups.  Patient states that she came back to [place], [place] 3 years ago and has been following with an oncologist since. Patient states her last chemo therapy was last week on February 18th, 2022. Re imaging confirmed metastatic relapse. Biopsy confirmed ovarian cancer recurrence. Scan showed extensive intraabdominal and splenic involvement and lung mets. CT brain was unremarkable on 2/20/22.     Upon my evaluation, patient is aphasic, alert, not oriented or following commands. Past Medical History:     Past Medical History:   Diagnosis Date    Anemia     Bleeding 10/2020    intra-abdominal bleeding -due to splenic mass with GI infiltration. Status post embolization    [facility] ([facility]     Depression     Diabetes mellitus ([facility]     GERD (gastroesophageal reflux disease)     Hx of blood clots     Hypertension     Metastatic cancer ([facility] 10/2020    extensive intraabdominal and splenic involvement and lung mets.  Ovarian cancer ([facility]     low grade serous ovarian carcinoma    Post chemo evaluation     2007: Chemo via med onc (Dr. Ginette Grover), 2008: Yolande Karely due to rising CA-125, 2013: intraperitoneal chemo,12/2015: Ca125 - 25     Splenic lesion         Past Surgical History:     Past Surgical History:   Procedure Laterality Date    ABSCESS DRAINAGE  2013    Franca rectal    ANUS SURGERY      ANAL FISSURECTOMY    CARDIAC CATHETERIZATION      COLECTOMY  03/2013    ex lap, tumor debulking, transverse colectomy w reanastamosis, subgastric omentectomy, intraperitoneal port placement    HYSTERECTOMY, TOTAL ABDOMINAL      IR EMBOLIZATION HEMORRHAGE  10/05/2020    intra-abdominal bleeding -due to splenic mass with GI infiltration. Status post embolization [employer] interlock coils x7. mri condtional 3t ok, safe immediately post implant.     IR PORT PLACEMENT EQUAL OR GREATER THAN 5 YEARS  08/24/2020    IR PORT PLACEMENT EQUAL OR GREATER THAN 5 YEARS 8/24/2020 Dayton Aquino MD [facility] SPECIAL PROCEDURES    PORT SURGERY      IP Port    TONSILLECTOMY          Medications Prior to Admission:     Prior to Admission medications    Medication Sig Start Date End Date Taking? Authorizing Provider   LORazepam (ATIVAN) 1 MG tablet Take 1 tablet by mouth every 8 hours as needed for Anxiety for up to 30 doses. 2/18/22 4/2/22  Mel Oakes MD   polyethylene glycol (MIRALAX) 17 g packet Take 17 g by mouth daily as needed for Constipation 2/18/22 3/20/22  Mel Oakes MD   lamoTRIgine (LAMICTAL) 25 MG tablet TAKE 3 TABS IN IN THE MORNING AND 3 TABS IN IN THE EVENING 2/9/22   Jr Ortiz MD   levETIRAcetam (KEPPRA) 750 MG tablet Take 2 tablets by mouth 2 times daily 2/9/22   Alex Bueno MD   diazePAM 5 MG/0.1ML LIQD 1 puff by Nasal route as needed (seizures)  Patient not taking: Reported on 2/18/2022 2/9/22   Alex Bueno MD   morphine (MS CONTIN) 30 MG extended release tablet TAKE 1 TABLET BY MOUTH 2 TIMES DAILY FOR 30 DAYS. 1/27/22 2/26/22  Manoj Bhatia MD   morphine (MS CONTIN) 15 MG extended release tablet TAKE 1 TABLET BY MOUTH 2 TIMES DAILY FOR 30 DAYS.  1/27/22 2/26/22  Mel Oakes MD   oxyCODONE-acetaminophen (PERCOCET) 5-325 MG per tablet TAKE 1 TABLET BY MOUTH EVERY 4 HOURS AS NEEDED FOR PAIN (BREAKTHROUGH PAIN) - REDUCE DOSES TAKEN AS PAIN BECOMES MANAGEABLE 1/27/22 2/26/22  Mel Oakes MD   pantoprazole (PROTONIX) 40 MG tablet Take 1 tablet by mouth daily 1/3/22   Mel Oakes MD   ondansetron (ZOFRAN-ODT) 4 MG disintegrating tablet Take 1 tablet by mouth every 8 hours as needed for Nausea or Vomiting 12/3/21   Mel Oakes MD   naloxegol (MOVANTIK) 12.5 MG TABS tablet Take 1 tablet by mouth every morning  Patient not taking: Reported on 2/18/2022 11/15/21   Mel Oakes MD   ferrous sulfate (IRON 325) 325 (65 Fe) MG tablet Take 1 tablet by mouth daily (with breakfast) 10/31/21   Anupam Solorzano MD   morphine (MSIR) 30 MG tablet Take 30 mg by mouth 2 times daily as needed for Pain. Historical Provider, MD   sertraline (ZOLOFT) 100 MG tablet TAKE 1 TABLET BY MOUTH DAILY 10/4/21 2/18/22  Lyubov Zamora MD   ELIQUIS 5 MG TABS tablet Take 5 mg by mouth 2 times daily  21   Historical Provider, MD        Allergies:     Ceftriaxone    Social History:     Tobacco:    reports that she has been smoking cigarettes. She has been smoking about 1.00 pack per day. She uses smokeless tobacco.  Alcohol:      reports previous alcohol use. Drug Use:  reports no history of drug use.     Family History:     Family History   Problem Relation Age of Onset    Alcohol Abuse Mother     Cirrhosis Mother        Review of Systems:     Review of Systems   Unable to perform ROS: Acuity of condition   patient when awaken from sleep.started having auditory and visual hallucination    Physical Exam:   BP (!) 154/59   Pulse 68   Temp 98.7 °F (37.1 °C) (Oral)   Resp 16   Wt 168 lb (76.2 kg)   SpO2 98%   BMI 27.96 kg/m²   Temp (24hrs), Av.5 °F (36.9 °C), Min:98.2 °F (36.8 °C), Max:98.7 °F (37.1 °C)    Recent Labs     22  0405 22  0650   POCGLU 173* 79       Intake/Output Summary (Last 24 hours) at 2022 1118  Last data filed at 2022 0120  Gross per 24 hour   Intake --   Output 475 ml   Net -475 ml         Neurologic Exam     couldnot be performed as patient was aggressive once awaken from sleep                                                 Investigations:      Laboratory Testing:  Recent Results (from the past 24 hour(s))   Potassium    Collection Time: 22  8:50 PM   Result Value Ref Range    Potassium 3.9 3.7 - 5.3 mmol/L   Magnesium    Collection Time: 22  8:50 PM   Result Value Ref Range    Magnesium 1.9 1.6 - 2.6 mg/dL   Lamotrigine Level    Collection Time: 22  8:50 PM   Result Value Ref Range    Lamotrigine Lvl 1.5 (L) 3.0 - 15.0 ug/mL   Basic Metabolic Panel w/ Reflex to MG    Collection Time: 22  5:21 AM Result Value Ref Range    Glucose 69 (L) 70 - 99 mg/dL    BUN 7 6 - 20 mg/dL    CREATININE 0.35 (L) 0.50 - 0.90 mg/dL    Calcium 9.0 8.6 - 10.4 mg/dL    Sodium 138 135 - 144 mmol/L    Potassium 3.7 3.7 - 5.3 mmol/L    Chloride 101 98 - 107 mmol/L    CO2 22 20 - 31 mmol/L    Anion Gap 15 9 - 17 mmol/L    GFR Non-African American >60 >60 mL/min    GFR African American >60 >60 mL/min    GFR Comment         CBC with Auto Differential    Collection Time: 02/24/22  5:21 AM   Result Value Ref Range    WBC 6.7 3.5 - 11.3 k/uL    RBC 3.83 (L) 3.95 - 5.11 m/uL    Hemoglobin 11.1 (L) 11.9 - 15.1 g/dL    Hematocrit 36.8 36.3 - 47.1 %    MCV 96.1 82.6 - 102.9 fL    MCH 29.0 25.2 - 33.5 pg    MCHC 30.2 28.4 - 34.8 g/dL    RDW 16.7 (H) 11.8 - 14.4 %    Platelets 256 (H) 410 - 453 k/uL    MPV 9.7 8.1 - 13.5 fL    NRBC Automated 0.3 (H) 0.0 per 100 WBC    Seg Neutrophils 74 (H) 36 - 65 %    Lymphocytes 19 (L) 24 - 43 %    Monocytes 5 3 - 12 %    Eosinophils % 1 1 - 4 %    Basophils 1 0 - 2 %    Immature Granulocytes 0 0 %    Segs Absolute 4.98 1.50 - 8.10 k/uL    Absolute Lymph # 1.29 1.10 - 3.70 k/uL    Absolute Mono # 0.32 0.10 - 1.20 k/uL    Absolute Eos # 0.04 0.00 - 0.44 k/uL    Basophils Absolute 0.08 0.00 - 0.20 k/uL    Absolute Immature Granulocyte <0.03 0.00 - 0.30 k/uL    RBC Morphology ANISOCYTOSIS PRESENT    POC Glucose Fingerstick    Collection Time: 02/24/22  6:50 AM   Result Value Ref Range    POC Glucose 79 65 - 105 mg/dL     Recent Labs     02/24/22  0521   WBC 6.7   RBC 3.83*   HGB 11.1*   HCT 36.8   MCV 96.1   MCH 29.0   MCHC 30.2   RDW 16.7*   *   MPV 9.7     Recent Labs     02/24/22  0521      K 3.7      CO2 22   BUN 7   CREATININE 0.35*   GLUCOSE 69*   CALCIUM 9.0     Hemoglobin A1C   Date Value Ref Range Status   03/10/2021 6.0 4.0 - 6.0 % Final       Assessment :      Primary Problem  Dehydration    Active Hospital Problems    Diagnosis Date Noted    Acute metabolic encephalopathy [G93.41]     Confusion [R41.0]     Urinary tract infection without hematuria [N39.0]     Seizure disorder ([address] [G40.909]     Breakthrough seizure ([address] [G40.919]     Lactic acidosis [E87.2] 02/21/2022    Ileus ([address] [K56.7] 02/21/2022    Elevated troponin [R77.8] 02/21/2022    Pericardial effusion [I31.3] 02/21/2022    Hypokalemia [E87.6] 02/21/2022    Acute respiratory failure with hypoxia (HCC) [J96.01] 02/21/2022    Dehydration [E86.0] 02/20/2022    AMS (altered mental status) [R41.82] 02/20/2022    Ovarian cancer ([facility] [C56.9] 05/29/2021    Chronic deep vein thrombosis (DVT) of femoral vein of left lower extremity ([facility] [I82.512] 03/11/2021    Fatigue [R53.83]     Cancer, metastatic to bone ([facility] [C79.51] 01/28/2021    Encephalopathy acute [G93.40]     Type 2 diabetes mellitus without complication, without long-term current use of insulin ([address] [E11.9]     Seizure ([facility] [R56.9] 10/21/2020       Patient is a 60 y.o. Non- / non  female  with a PMH of ovarian cancer with metastasis post chemotherapy/radiotherapy couple of days ago presented with N/V, chills, fatigue and altered mental status.  Patient's urine culture was positive for E coli, currently on Unasyn. Patient admitted to inpatient status for further management. Patient had seizure O/N and was loaded with 2mg Ativan and 1g Keppra. Lamictal was held yesterday as patient not tolerating oral feeds. MRI Brain and EEG to be done today.       Plan:     1.continue current regimen  ,100 mg bid lamictal and 1500 keppra bid   2. Continue Unasyn for ongoing UTI  3. MRI Brain W WO Contrast scheduled but could not be done because patient agitated   5. Tylenol or toradol 15mg PRN for pain  6. Neuro checks per protocol   7. Neurology will continue following patient    Follow-up further recommendations after discussing the case with attending  The plan was discussed with the patient, patient's family and the medical staff. Consultations:   IP CONSULT TO ONCOLOGY  IP CONSULT TO INTERNAL MEDICINE  IP CONSULT TO CASE MANAGEMENT  IP CONSULT TO NEUROLOGY  IP CONSULT TO INTERVENTIONAL RADIOLOGY    Patient is admitted as inpatient status because of co-morbidities listed above, severity of signs and symptoms as outlined, requirement for current medical therapies and most importantly because of direct risk to patient if care not provided in a hospital setting.     Gisela Schofield MD  Internal Medicine Resident, PGY2   [facility] 21 2/24/2022,11:18 AM

## 2022-02-25 ENCOUNTER — HOSPITAL ENCOUNTER (OUTPATIENT)
Dept: INFUSION THERAPY | Age: 57
End: 2022-02-25

## 2022-02-25 LAB
ABSOLUTE EOS #: 0.05 K/UL (ref 0–0.44)
ABSOLUTE IMMATURE GRANULOCYTE: <0.03 K/UL (ref 0–0.3)
ABSOLUTE LYMPH #: 1.02 K/UL (ref 1.1–3.7)
ABSOLUTE MONO #: 0.51 K/UL (ref 0.1–1.2)
ANION GAP SERPL CALCULATED.3IONS-SCNC: 14 MMOL/L (ref 9–17)
BASOPHILS # BLD: 2 % (ref 0–2)
BASOPHILS ABSOLUTE: 0.08 K/UL (ref 0–0.2)
BUN BLDV-MCNC: 8 MG/DL (ref 6–20)
CALCIUM SERPL-MCNC: 8.9 MG/DL (ref 8.6–10.4)
CHLORIDE BLD-SCNC: 104 MMOL/L (ref 98–107)
CO2: 21 MMOL/L (ref 20–31)
CREAT SERPL-MCNC: 0.46 MG/DL (ref 0.5–0.9)
CULTURE: NORMAL
CULTURE: NORMAL
EKG ATRIAL RATE: 64 BPM
EKG P AXIS: 42 DEGREES
EKG P-R INTERVAL: 152 MS
EKG Q-T INTERVAL: 454 MS
EKG QRS DURATION: 88 MS
EKG QTC CALCULATION (BAZETT): 468 MS
EKG R AXIS: 28 DEGREES
EKG T AXIS: 46 DEGREES
EKG VENTRICULAR RATE: 64 BPM
EOSINOPHILS RELATIVE PERCENT: 1 % (ref 1–4)
GFR AFRICAN AMERICAN: >60 ML/MIN
GFR NON-AFRICAN AMERICAN: >60 ML/MIN
GFR SERPL CREATININE-BSD FRML MDRD: ABNORMAL ML/MIN/{1.73_M2}
GLUCOSE BLD-MCNC: 121 MG/DL (ref 65–105)
GLUCOSE BLD-MCNC: 132 MG/DL (ref 65–105)
GLUCOSE BLD-MCNC: 76 MG/DL (ref 65–105)
GLUCOSE BLD-MCNC: 79 MG/DL (ref 70–99)
HCT VFR BLD CALC: 37.7 % (ref 36.3–47.1)
HEMOGLOBIN: 11.2 G/DL (ref 11.9–15.1)
IMMATURE GRANULOCYTES: 1 %
KEPPRA: 15 UG/ML
LYMPHOCYTES # BLD: 29 % (ref 24–43)
Lab: NORMAL
Lab: NORMAL
MAGNESIUM: 1.8 MG/DL (ref 1.6–2.6)
MCH RBC QN AUTO: 28.1 PG (ref 25.2–33.5)
MCHC RBC AUTO-ENTMCNC: 29.7 G/DL (ref 28.4–34.8)
MCV RBC AUTO: 94.5 FL (ref 82.6–102.9)
MONOCYTES # BLD: 14 % (ref 3–12)
NRBC AUTOMATED: 0 PER 100 WBC
PDW BLD-RTO: 16.5 % (ref 11.8–14.4)
PLATELET # BLD: 428 K/UL (ref 138–453)
PMV BLD AUTO: 9.7 FL (ref 8.1–13.5)
POTASSIUM SERPL-SCNC: 3.4 MMOL/L (ref 3.7–5.3)
RBC # BLD: 3.99 M/UL (ref 3.95–5.11)
RBC # BLD: ABNORMAL 10*6/UL
SEG NEUTROPHILS: 52 % (ref 36–65)
SEGMENTED NEUTROPHILS ABSOLUTE COUNT: 1.85 K/UL (ref 1.5–8.1)
SODIUM BLD-SCNC: 139 MMOL/L (ref 135–144)
SPECIMEN DESCRIPTION: NORMAL
SPECIMEN DESCRIPTION: NORMAL
WBC # BLD: 3.5 K/UL (ref 3.5–11.3)

## 2022-02-25 PROCEDURE — 6370000000 HC RX 637 (ALT 250 FOR IP): Performed by: STUDENT IN AN ORGANIZED HEALTH CARE EDUCATION/TRAINING PROGRAM

## 2022-02-25 PROCEDURE — 2580000003 HC RX 258: Performed by: STUDENT IN AN ORGANIZED HEALTH CARE EDUCATION/TRAINING PROGRAM

## 2022-02-25 PROCEDURE — 99232 SBSQ HOSP IP/OBS MODERATE 35: CPT | Performed by: INTERNAL MEDICINE

## 2022-02-25 PROCEDURE — 6360000002 HC RX W HCPCS: Performed by: STUDENT IN AN ORGANIZED HEALTH CARE EDUCATION/TRAINING PROGRAM

## 2022-02-25 PROCEDURE — 6370000000 HC RX 637 (ALT 250 FOR IP)

## 2022-02-25 PROCEDURE — 80048 BASIC METABOLIC PNL TOTAL CA: CPT

## 2022-02-25 PROCEDURE — 99232 SBSQ HOSP IP/OBS MODERATE 35: CPT | Performed by: PSYCHIATRY & NEUROLOGY

## 2022-02-25 PROCEDURE — 85025 COMPLETE CBC W/AUTO DIFF WBC: CPT

## 2022-02-25 PROCEDURE — 97530 THERAPEUTIC ACTIVITIES: CPT

## 2022-02-25 PROCEDURE — 80177 DRUG SCRN QUAN LEVETIRACETAM: CPT

## 2022-02-25 PROCEDURE — 36415 COLL VENOUS BLD VENIPUNCTURE: CPT

## 2022-02-25 PROCEDURE — C9113 INJ PANTOPRAZOLE SODIUM, VIA: HCPCS | Performed by: STUDENT IN AN ORGANIZED HEALTH CARE EDUCATION/TRAINING PROGRAM

## 2022-02-25 PROCEDURE — 82947 ASSAY GLUCOSE BLOOD QUANT: CPT

## 2022-02-25 PROCEDURE — 1200000000 HC SEMI PRIVATE

## 2022-02-25 PROCEDURE — 83735 ASSAY OF MAGNESIUM: CPT

## 2022-02-25 PROCEDURE — 97110 THERAPEUTIC EXERCISES: CPT

## 2022-02-25 PROCEDURE — 97116 GAIT TRAINING THERAPY: CPT

## 2022-02-25 RX ORDER — LEVETIRACETAM 10 MG/ML
1000 INJECTION INTRAVASCULAR EVERY 12 HOURS
Status: DISCONTINUED | OUTPATIENT
Start: 2022-02-25 | End: 2022-02-27

## 2022-02-25 RX ORDER — QUETIAPINE FUMARATE 25 MG/1
50 TABLET, FILM COATED ORAL NIGHTLY
Status: DISCONTINUED | OUTPATIENT
Start: 2022-02-25 | End: 2022-02-26

## 2022-02-25 RX ORDER — CHOLECALCIFEROL (VITAMIN D3) 125 MCG
5 CAPSULE ORAL NIGHTLY PRN
Status: DISCONTINUED | OUTPATIENT
Start: 2022-02-25 | End: 2022-02-28 | Stop reason: HOSPADM

## 2022-02-25 RX ADMIN — AMPICILLIN AND SULBACTAM 3000 MG: 1; 2 INJECTION, POWDER, FOR SOLUTION INTRAMUSCULAR; INTRAVENOUS at 15:12

## 2022-02-25 RX ADMIN — QUETIAPINE FUMARATE 50 MG: 25 TABLET ORAL at 20:50

## 2022-02-25 RX ADMIN — SODIUM CHLORIDE, PRESERVATIVE FREE 10 ML: 5 INJECTION INTRAVENOUS at 10:49

## 2022-02-25 RX ADMIN — SODIUM CHLORIDE: 9 INJECTION, SOLUTION INTRAVENOUS at 20:54

## 2022-02-25 RX ADMIN — SODIUM CHLORIDE, PRESERVATIVE FREE 10 ML: 5 INJECTION INTRAVENOUS at 10:50

## 2022-02-25 RX ADMIN — LEVETIRACETAM 1000 MG: 10 INJECTION INTRAVENOUS at 11:32

## 2022-02-25 RX ADMIN — ENOXAPARIN SODIUM 40 MG: 100 INJECTION SUBCUTANEOUS at 10:44

## 2022-02-25 RX ADMIN — AMPICILLIN AND SULBACTAM 3000 MG: 1; 2 INJECTION, POWDER, FOR SOLUTION INTRAMUSCULAR; INTRAVENOUS at 10:55

## 2022-02-25 RX ADMIN — AMPICILLIN AND SULBACTAM 3000 MG: 1; 2 INJECTION, POWDER, FOR SOLUTION INTRAMUSCULAR; INTRAVENOUS at 19:32

## 2022-02-25 RX ADMIN — POTASSIUM CHLORIDE 40 MEQ: 1500 TABLET, EXTENDED RELEASE ORAL at 19:30

## 2022-02-25 RX ADMIN — ENOXAPARIN SODIUM 40 MG: 100 INJECTION SUBCUTANEOUS at 20:50

## 2022-02-25 RX ADMIN — LAMOTRIGINE 125 MG: 100 TABLET ORAL at 20:49

## 2022-02-25 RX ADMIN — LORAZEPAM 0.5 MG: 0.5 TABLET ORAL at 08:57

## 2022-02-25 RX ADMIN — LEVETIRACETAM 1000 MG: 10 INJECTION INTRAVENOUS at 23:04

## 2022-02-25 RX ADMIN — LAMOTRIGINE 125 MG: 100 TABLET ORAL at 14:08

## 2022-02-25 RX ADMIN — LORAZEPAM 0.5 MG: 0.5 TABLET ORAL at 12:44

## 2022-02-25 RX ADMIN — PANTOPRAZOLE SODIUM 40 MG: 40 INJECTION, POWDER, FOR SOLUTION INTRAVENOUS at 10:51

## 2022-02-25 RX ADMIN — AMPICILLIN AND SULBACTAM 3000 MG: 1; 2 INJECTION, POWDER, FOR SOLUTION INTRAMUSCULAR; INTRAVENOUS at 01:42

## 2022-02-25 ASSESSMENT — ENCOUNTER SYMPTOMS
SHORTNESS OF BREATH: 0
WHEEZING: 0
NAUSEA: 0
DIARRHEA: 0
COUGH: 0
VOMITING: 0
ABDOMINAL DISTENTION: 0
ABDOMINAL PAIN: 0
CHEST TIGHTNESS: 0

## 2022-02-25 ASSESSMENT — PAIN SCALES - GENERAL: PAINLEVEL_OUTOF10: 0

## 2022-02-25 NOTE — CARE COORDINATION
Transitional Planning:  Called POA son Will to discuss discharge plan. Explained that mother would need 24 hour care. Son states he has already filed a complaint due to pt not getting her seizure meds. Unwilling to discuss any discharge plan. CM tried to discuss home care as a list was provided but son not willing to discuss. States my mother just needs time to rest and her brain will be back to it's old self. CM called daughter Astrid Riley who states [facility] but left it in pt's room and forgot about it. She states she will up tomorrow. CM informed her to ask for CM so we can work on transitional planning.

## 2022-02-25 NOTE — PROGRESS NOTES
[facility]   [facility]    Progress Note             Date:   2/25/2022  Patient name:  Carrie Hunter  Date of admission:  2/20/2022  9:53 AM  MRN:   [id number]  Account:  [de-identified]  YOB: 1965  PCP:    Capo Pablo MD  Room:   7438/6504-20  Code Status:    Full Code    Chief Complaint:     Chief Complaint   Patient presents with    Altered Mental Status    Nausea & Vomiting       Interval hx: The patient was seen and examined at bedside. Is vitally stable, alert and oriented x 2. No acute events overnight. The patient stated that she has had no more seizures. MRI brain negative for acute abnormality, EEG was abnormal due to diffuse polymorphic theta slowing suggesting mild encephalopathy. Frequent left occipital temporal lateralized periodic discharges with underlying rhythmicity (LPDs + R) conferred an increased risk for focal onset seizures. Patient agitated, did not sleep well last night. Nurse reports patient having behavioral issues on and off. Possible KEppra side effect, will reduce dose to 1000 bid and inc lamictal 125 mg bid     Brief History of Present Illness: The patient is a 60 y.o. female with significant past medical history of ovarian cancer with metastasis s/p chemo and radiation therapy, seizure disorder on Lamictal and Keppra who presents with nausea, vomiting and AMS. 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 She had surgical debulking and she had systemic treatment with Taxol and carboplatin for 6 cycles. Patient was in remission for about 2 years. She had a relapse in 2007 and treated with topotecan with good results. Patient relapsed again in 2008 and was treated with Taxol carboplatin. After 3 cycles of systemic chemotherapy she had problems with carboplatin so she finished 3 more cycles with Taxol.   She did well again for 2 to 3 years until she had a relapse in 2012 and she had intraperitoneal chemotherapy treatment with Taxol. Patient was last seen by her oncologist in 2014 at which time she had relatively stable disease with normal tumor marker and no significant abnormal images. Patient moved to [place] after that and she was lost for oncology follow-ups. Patient states that she came back to [place], [place] 3 years ago and has been following with an oncologist since. Patient states her last chemo therapy was last week on February 18th, 2022. Re imaging confirmed metastatic relapse. Biopsy confirmed ovarian cancer recurrence. Scan showed extensive intraabdominal and splenic involvement and lung mets. CT brain was unremarkable on 2/20/22. Past Medical History:     Past Medical History:   Diagnosis Date    Anemia     Bleeding 10/2020    intra-abdominal bleeding -due to splenic mass with GI infiltration. Status post embolization    Cervical cancer ([address]     Depression     Diabetes mellitus ([address]     GERD (gastroesophageal reflux disease)     Hx of blood clots     Hypertension     Metast[facility] ([facility] 10/2020    extensive intraabdominal and splenic involvement and lung mets.  Ovarian cancer ([address]     low grade serous ovarian carcinoma    Post chemo evaluation     2007: Chemo via med onc (Dr. Audrey Kurtz), 2008: Kennieth Saliva due to rising CA-125, 2013: intraperitoneal chemo,12/2015: Ca125 - 25     Splenic lesion         Past Surgical History:     Past Surgical History:   Procedure Laterality Date    ABSCESS DRAINAGE  2013    Franca rectal    ANUS SURGERY      ANAL FISSURECTOMY    CARDIAC CATHETERIZATION      COLECTOMY  03/2013    ex lap, tumor debulking, transverse colectomy w reanastamosis, subgastric omentectomy, intraperitoneal port placement    HYSTERECTOMY, TOTAL ABDOMINAL      IR EMBOLIZATION HEMORRHAGE  10/05/2020    intra-abdominal bleeding -due to splenic mass with GI infiltration.   Status post embolization Neotropix interlock coils x7. mri condtional 3t ok, safe immediately post implant.  IR PORT PLACEMENT EQUAL OR GREATER THAN 5 YEARS  08/24/2020    IR PORT PLACEMENT EQUAL OR GREATER THAN 5 YEARS 8/24/2020 Gala Gutierrez MD [facility]    PORT SURGERY      IP Port    TONSILLECTOMY          Medications Prior to Admission:     Prior to Admission medications    Medication Sig Start Date End Date Taking? Authorizing Provider   LORazepam (ATIVAN) 1 MG tablet Take 1 tablet by mouth every 8 hours as needed for Anxiety for up to 30 doses. 2/18/22 4/2/22  Elisabet Matute MD   polyethylene glycol (MIRALAX) 17 g packet Take 17 g by mouth daily as needed for Constipation 2/18/22 3/20/22  Elisabet Matute MD   lamoTRIgine (LAMICTAL) 25 MG tablet TAKE 3 TABS IN IN THE MORNING AND 3 TABS IN IN THE EVENING 2/9/22   Jr Morfin MD   levETIRAcetam (KEPPRA) 750 MG tablet Take 2 tablets by mouth 2 times daily 2/9/22   Hector Parnell MD   diazePAM 5 MG/0.1ML LIQD 1 puff by Nasal route as needed (seizures)  Patient not taking: Reported on 2/18/2022 2/9/22   Hector Parnell MD   morphine (MS CONTIN) 30 MG extended release tablet TAKE 1 TABLET BY MOUTH 2 TIMES DAILY FOR 30 DAYS. 1/27/22 2/26/22  Ac Bhatia MD   morphine (MS CONTIN) 15 MG extended release tablet TAKE 1 TABLET BY MOUTH 2 TIMES DAILY FOR 30 DAYS.  1/27/22 2/26/22  Elisabet Matute MD   oxyCODONE-acetaminophen (PERCOCET) 5-325 MG per tablet TAKE 1 TABLET BY MOUTH EVERY 4 HOURS AS NEEDED FOR PAIN (BREAKTHROUGH PAIN) - REDUCE DOSES TAKEN AS PAIN BECOMES MANAGEABLE 1/27/22 2/26/22  Elisabet Matute MD   pantoprazole (PROTONIX) 40 MG tablet Take 1 tablet by mouth daily 1/3/22   Elisabet Matute MD   ondansetron (ZOFRAN-ODT) 4 MG disintegrating tablet Take 1 tablet by mouth every 8 hours as needed for Nausea or Vomiting 12/3/21   Elisabet Matute MD   naloxegol (MOVANTIK) 12.5 MG TABS tablet Take 1 tablet by mouth every morning  Patient not taking: Reported on 2022 11/15/21   Traci Dorsey MD   ferrous sulfate (IRON 325) 325 (65 Fe) MG tablet Take 1 tablet by mouth daily (with breakfast) 10/31/21   Rianna Lr MD   morphine (MSIR) 30 MG tablet Take 30 mg by mouth 2 times daily as needed for Pain. Historical Provider, MD   sertraline (ZOLOFT) 100 MG tablet TAKE 1 TABLET BY MOUTH DAILY 10/4/21 2/18/22  Traci Dorsey MD   ELIQUIS 5 MG TABS tablet Take 5 mg by mouth 2 times daily  21   Historical Provider, MD        Allergies:     Ceftriaxone    Social History:     Tobacco:    reports that she has been smoking cigarettes. She has been smoking about 1.00 pack per day. She uses smokeless tobacco.  Alcohol:      reports previous alcohol use. Drug Use:  reports no history of drug use. Family History:     Family History   Problem Relation Age of Onset    Alcohol Abuse Mother     Cirrhosis Mother        Review of Systems:     Review of Systems   Constitutional: Negative for activity change, appetite change, chills and fever. HENT: Negative for congestion. Respiratory: Negative for cough, chest tightness, shortness of breath and wheezing. Cardiovascular: Negative for chest pain and leg swelling. Gastrointestinal: Negative for abdominal distention, abdominal pain, diarrhea, nausea and vomiting. Genitourinary: Negative for dysuria and flank pain. Skin: Negative for rash. Neurological: Negative for dizziness, weakness, numbness and headaches. Psychiatric/Behavioral: Positive for agitation and behavioral problems. Negative for confusion and sleep disturbance.        Physical Exam:   BP (!) 160/80   Pulse 64   Temp 97.4 °F (36.3 °C) (Oral)   Resp 12   Wt 163 lb 12.8 oz (74.3 kg)   SpO2 98%   BMI 27.26 kg/m²   Temp (24hrs), Av.8 °F (36.6 °C), Min:97.4 °F (36.3 °C), Max:98.4 °F (36.9 °C)    Recent Labs     22  1515 22  1615 22  2216 22  0812   POCGLU 71 74 96 76 Intake/Output Summary (Last 24 hours) at 2/25/2022 1148  Last data filed at 2/25/2022 1761  Gross per 24 hour   Intake --   Output 150 ml   Net -150 ml         Neurologic Exam     GENERAL  Appears comfortable and in no distress   HEENT  NC/ AT   HEART  S1 and S2 heard; palpation of pulses: radial pulse    NECK  Supple and no bruits heard   MENTAL STATUS:  Alert, oriented, intact memory, no confusion, normal speech, normal language, no hallucination or delusion   CRANIAL NERVES: II     -      Visual fields intact to confrontation  III,IV,VI -  PERR, EOMs full, no ptosis  V     -     Normal facial sensation   VII    -     Normal facial symmetry  VIII   -     Intact hearing   IX,X -     Symmetrical palate  XI    -     Symmetrical shoulder shrug  XII   -     Midline tongue, no atrophy    MOTOR FUNCTION: RUE: Significant for good strength of grade 5/5 in proximal and distal muscle groups   LUE: Significant for good strength of grade 5/5 in proximal and distal muscle groups   RLE: Significant for good strength of grade 5/5 in proximal and distal muscle groups   LLE: Significant for good strength of grade 5/5 in proximal and distal muscle groups      Normal bulk, normal tone and no involuntary movements, no tremor   SENSORY FUNCTION:  Normal touch, normal pinprick, normal vibration, normal proprioception   CEREBELLAR FUNCTION:  Intact fine motor control over upper limbs and lower limbs   REFLEX FUNCTION:  Symmetric in upper and lower extremities, no Babinski sign   STATION and GAIT  Normal gait and tandem station, normal tip toes and heel walking       Investigations:      Laboratory Testing:  Recent Results (from the past 24 hour(s))   EKG 12 Lead    Collection Time: 02/24/22 12:49 PM   Result Value Ref Range    Ventricular Rate 64 BPM    Atrial Rate 64 BPM    P-R Interval 152 ms    QRS Duration 88 ms    Q-T Interval 454 ms    QTc Calculation (Bazett) 468 ms    P Axis 42 degrees    R Axis 28 degrees    T Axis 46 degrees   POC Glucose Fingerstick    Collection Time: 02/24/22  3:15 PM   Result Value Ref Range    POC Glucose 71 65 - 105 mg/dL   POC Glucose Fingerstick    Collection Time: 02/24/22  4:15 PM   Result Value Ref Range    POC Glucose 74 65 - 105 mg/dL   POC Glucose Fingerstick    Collection Time: 02/24/22 10:16 PM   Result Value Ref Range    POC Glucose 96 65 - 105 mg/dL   Basic Metabolic Panel w/ Reflex to MG    Collection Time: 02/25/22  6:10 AM   Result Value Ref Range    Glucose 79 70 - 99 mg/dL    BUN 8 6 - 20 mg/dL    CREATININE 0.46 (L) 0.50 - 0.90 mg/dL    Calcium 8.9 8.6 - 10.4 mg/dL    Sodium 139 135 - 144 mmol/L    Potassium 3.4 (L) 3.7 - 5.3 mmol/L    Chloride 104 98 - 107 mmol/L    CO2 21 20 - 31 mmol/L    Anion Gap 14 9 - 17 mmol/L    GFR Non-African American >60 >60 mL/min    GFR African American >60 >60 mL/min    GFR Comment         CBC with Auto Differential    Collection Time: 02/25/22  6:10 AM   Result Value Ref Range    WBC 3.5 3.5 - 11.3 k/uL    RBC 3.99 3.95 - 5.11 m/uL    Hemoglobin 11.2 (L) 11.9 - 15.1 g/dL    Hematocrit 37.7 36.3 - 47.1 %    MCV 94.5 82.6 - 102.9 fL    MCH 28.1 25.2 - 33.5 pg    MCHC 29.7 28.4 - 34.8 g/dL    RDW 16.5 (H) 11.8 - 14.4 %    Platelets 425 089 - 222 k/uL    MPV 9.7 8.1 - 13.5 fL    NRBC Automated 0.0 0.0 per 100 WBC    RBC Morphology ANISOCYTOSIS PRESENT     Seg Neutrophils 52 36 - 65 %    Lymphocytes 29 24 - 43 %    Monocytes 14 (H) 3 - 12 %    Eosinophils % 1 1 - 4 %    Basophils 2 0 - 2 %    Immature Granulocytes 1 (H) 0 %    Segs Absolute 1.85 1.50 - 8.10 k/uL    Absolute Lymph # 1.02 (L) 1.10 - 3.70 k/uL    Absolute Mono # 0.51 0.10 - 1.20 k/uL    Absolute Eos # 0.05 0.00 - 0.44 k/uL    Basophils Absolute 0.08 0.00 - 0.20 k/uL    Absolute Immature Granulocyte <0.03 0.00 - 0.30 k/uL   Magnesium    Collection Time: 02/25/22  6:10 AM   Result Value Ref Range    Magnesium 1.8 1.6 - 2.6 mg/dL   POC Glucose Fingerstick    Collection Time: 02/25/22  8:12 AM Result Value Ref Range    POC Glucose 76 65 - 105 mg/dL     Recent Labs     02/25/22  0610   WBC 3.5   RBC 3.99   HGB 11.2*   HCT 37.7   MCV 94.5   MCH 28.1   MCHC 29.7   RDW 16.5*      MPV 9.7     Recent Labs     02/25/22  0610      K 3.4*      CO2 21   BUN 8   CREATININE 0.46*   GLUCOSE 79   CALCIUM 8.9     Hemoglobin A1C   Date Value Ref Range Status   03/10/2021 6.0 4.0 - 6.0 % Final       Assessment :      Primary Problem  Dehydration    Active Hospital Problems    Diagnosis Date Noted    Acute metabolic encephalopathy [T88.24]     Confusion [R41.0]     Urinary tract infection without hematuria [N39.0]     Seizure disorder ([address] [G40.909]     Breakthrough seizure ([address] [G40.919]     Lactic acidosis [E87.2] 02/21/2022    Ileus ([address] [K56.7] 02/21/2022    Elevated troponin [R77.8] 02/21/2022    Pericardial effusion [I31.3] 02/21/2022    Hypokalemia [E87.6] 02/21/2022    Acute respiratory failure with hypoxia (HCC) [J96.01] 02/21/2022    Dehydration [E86.0] 02/20/2022    AMS (altered mental status) [R41.82] 02/20/2022    Ovarian cancer ([address] [C56.9] 05/29/2021    Chronic deep vein thrombosis (DVT) of femoral vein of left lower extremity (HCC) [I82.512] 03/11/2021    Fatigue [R53.83]     Cancer, metastatic to bone ([address] [C79.51] 01/28/2021    Encephalopathy acute [G93.40]     Type 2 diabetes mellitus without complication, without long-term current use of insulin ([address] [E11.9]     Seizure ([address] [R56.9] 10/21/2020       Patient is a 60 y.o. Non- / non  female  with a PMH of ovarian cancer with metastasis post chemotherapy/radiotherapy couple of days ago presented with N/V, chills, fatigue and altered mental status. Patient's urine culture was positive for E coli, currently on Unasyn. Patient admitted to inpatient status for further management. Patient had seizure O/N and was loaded with 2mg Ativan and 1g Keppra.  Patient agitated with Keppra, dc to 1000 bid and inc Lamictal 125 mg bid        Plan:       Inc Lamictal to 125 mg bid, decrease Keppra to 1000 bid as patient is developing agitation- possible Keppra side effects   Check keppra levels   Continue Unasyn for UTI   MRI brain w/wo- no acute change       Follow-up further recommendations after discussing the case with attending  The plan was discussed with the patient, patient's family and the medical staff. Consultations:   IP CONSULT TO ONCOLOGY  IP CONSULT TO INTERNAL MEDICINE  IP CONSULT TO CASE MANAGEMENT  IP CONSULT TO NEUROLOGY    Patient is admitted as inpatient status because of co-morbidities listed above, severity of signs and symptoms as outlined, requirement for current medical therapies and most importantly because of direct risk to patient if care not provided in a hospital setting.     Yuniel Morrell MD  2/25/2022  11:48 AM    Copy sent to Dr. Fang Patel MD

## 2022-02-25 NOTE — PLAN OF CARE
Updated the patient's son about patient's condition.      [address]  Internal Medicine Resident  [facility]  2/25/2022 4:18 PM

## 2022-02-25 NOTE — PROGRESS NOTES
Physical Therapy  Facility/Department: [facility] ONC/MED SURG  Daily Treatment Note  NAME: Margret Lutz  : 1965  MRN: [id number]    Date of Service: 2022    Discharge Recommendations:  Patient would benefit from continued therapy after discharge   PT Equipment Recommendations  Equipment Needed: No    Assessment   Body structures, Functions, Activity limitations: Decreased functional mobility ; Decreased strength;Decreased endurance  Assessment: Pt requires CGA for STS transfer. Pt sat EOB for 8 mins with CGA with no LOB noted. Pt ambulated 150 ft. in the burris with CGA using a RW. Pt requires frequent verbal cues to stay on task. Pt is inconsistent with following commands. Pt would benefit from continued PT following discharge to address functional deficits. Prognosis: Good  Decision Making: Medium Complexity  PT Education: Goals;PT Role;Plan of Care  REQUIRES PT FOLLOW UP: Yes  Activity Tolerance  Activity Tolerance: Patient limited by endurance; Patient limited by fatigue     Patient Diagnosis(es): The primary encounter diagnosis was Confusion. Diagnoses of Fatigue, unspecified type, Dehydration, and Hypoxia were also pertinent to this visit. has a past medical history of Anemia, Bleeding, Cervical cancer ([address] Depression, Diabetes mellitus ([address] GERD (gastroesophageal reflux disease), Hx of blood clots, Hypertension, Metastatic cancer ([address] Ovarian cancer ([address] Post chemo evaluation, and Splenic lesion. has a past surgical history that includes Hysterectomy, total abdominal; Port Surgery; Tonsillectomy; IR PORT PLACEMENT > 5 YEARS (2020); Anus surgery; Abscess Drainage (); colectomy (2013); IR EMBOLIZATION HEMORRHAGE (10/05/2020); and Cardiac catheterization. Restrictions  Restrictions/Precautions  Restrictions/Precautions: Seizure,Fall Risk  Required Braces or Orthoses?: No  Position Activity Restriction  Other position/activity restrictions:  Up with assist ;medical history of ovarian cancer with metastasis s/p chemo and radiation therapy; Seizure-like activity 2/23AM-daugther stated pt does present different for 4-5 days post sx typically. Subjective   General  Chart Reviewed: Yes  Response To Previous Treatment: Patient with no complaints from previous session. Family / Caregiver Present: No  Subjective  Subjective: Pt and RN agreeable to PT. Pain Screening  Patient Currently in Pain: Denies  Vital Signs  Patient Currently in Pain: Denies       Orientation  Orientation  Overall Orientation Status: Impaired  Orientation Level: Oriented to person;Disoriented to situation;Disoriented to place; Disoriented to time  Cognition   Cognition  Overall Cognitive Status: Exceptions  Arousal/Alertness: Delayed responses to stimuli  Following Commands: Follows one step commands with increased time; Follows one step commands with repetition; Inconsistently follows commands  Attention Span: Difficulty dividing attention; Difficulty attending to directions; Attends with cues to redirect  Safety Judgement: Decreased awareness of need for assistance;Decreased awareness of need for safety  Problem Solving: Decreased awareness of errors  Insights: Not aware of deficits  Initiation: Requires cues for all  Sequencing: Requires cues for all  Objective   Bed mobility  Supine to Sit: Unable to assess  Sit to Supine: Unable to assess  Scooting: Contact guard assistance  Transfers  Sit to Stand: Contact guard assistance  Stand to sit: Contact guard assistance  Ambulation  Ambulation?: Yes  More Ambulation?: No  Ambulation 1  Surface: level tile  Device: Rolling Walker  Assistance: Contact guard assistance  Gait Deviations: Staggers; Slow Cynthia; Increased TAWANDA  Distance: 150 ft. Stairs/Curb  Stairs?: No     Balance  Posture: Good  Sitting - Static: Fair  Sitting - Dynamic: Poor  Standing - Static: Poor  Standing - Dynamic: Poor  Comments: Assessed with RW.   Exercises  Hip Flexion: x10 BLE  Hip Abduction: x10 BLE  Knee Long Arc Quad: x10 BLE  Ankle Pumps: x10 BLE                                                   AM-PAC Score  AM-PAC Inpatient Mobility Raw Score : 16 (02/25/22 1112)  AM-PAC Inpatient T-Scale Score : 40.78 (02/25/22 1112)  Mobility Inpatient CMS 0-100% Score: 54.16 (02/25/22 1112)  Mobility Inpatient CMS G-Code Modifier : CK (02/25/22 1112)          Goals  Short term goals  Time Frame for Short term goals: 10 visits  Short term goal 1: transfers with SBA  Short term goal 2: amb 150 ft with a RW x SBA  Short term goal 3: ascend/descend 4steps with SBA  Short term goal 4: 20 min exercise program x SBA    Plan    Plan  Times per week: 5-6x wk  Times per day: Daily  Current Treatment Recommendations: Strengthening,Functional Mobility Training,Transfer Training,Gait Training,Cognitive Reorientation,Safety Education & Training,Balance Training,Endurance Training,Stair training  Safety Devices  Type of devices: Nurse notified,Left in bed,Call light within reach,Sitter present  Restraints  Initially in place: No     Therapy Time   Individual Concurrent Group Co-treatment   Time In 1005         Time Out 1030         Minutes 25         Timed Code Treatment Minutes: [address], [facility]

## 2022-02-25 NOTE — PROGRESS NOTES
[facility]  Internal Medicine Teaching Residency Program  Inpatient Daily Progress Note  ______________________________________________________________________________    Patient: Keon Arredondo [facility]  YOB: 1965   TE    Acct: [de-identified]     Room: 74[facility]6297-77  Admit date: 2022  Today's date: 22  Number of days in the hospital: 5    SUBJECTIVE   Admitting Diagnosis: Dehydration  CC: Altered mental status, Nausea and vomiting. Pt seen and examined. MRI done and it did not show any mets, infarcts or hemorrhages. Patient has pulled out her NG tube and is off of LTM E.  Sitter at bedside. No seizures observed overnight. Patient is unable to understand or respond to commands. Patient has elevated blood pressure with /80 mmHg for which she is on labetalol and hydralazine. Patient is saturating 98 % on room air. ROS:  Unable to obtain due to patient's mentation. BRIEF HISTORY     The patient is a 68-year-old female with significant past medical history of ovarian cancer with metastasis s/p chemo and radiotherapy, seizure disorder on Lamictal and Keppra who presents with altered mental status, nausea and vomiting. 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 She had surgical debulking and systemic treatment with Taxol and carboplatin for 6 cycles. She was in remission for about 2 years. She had a relapse in  and was treated with topotecan with good results. Patient relapsed again in  and was treated with Taxol and carboplatin. After 3 cycles of systemic chemotherapy she had problems with carboplatin so she finished 3 more cycles with Taxol. She did well again for 2 to 3 years and then she had a relapse in  and had intraperitoneal chemotherapy treatment with Taxol.   Patient was last seen by her oncologist in  at which time she had relatively stable disease with normal tumor marker and no significant abnormal images. Patient moved to [place] after that and she was lost for oncology follow-ups. Patient states that she came back to [place] 3 years ago and has been following with an oncologist since. Patient states her last chemotherapy was last week on February 18 2, 2022. She received Gemzar for chemotherapy and nausea, vomiting, diarrhea, mouth sores, drowsiness, muscle aches are some of the known side effects of Gemzar. Reimaging confirmed metastatic relapse and biopsy confirmed ovarian cancer recurrence. Scan showed extensive intra-abdominal and splenic involvement and lung metastasis. CT brain was unremarkable on 2/20/2022. Patient currently has extensive intra-abdominal, splenic and lung involvement from ovarian cancer. Patient with recent intra-abdominal bleeding due to splenic mass with GI infiltration s/p embolization. She is currently on palliative treatment with Doxil. Patient recently had a DVT last week for which she was started on Eliquis. Patient's last chemotherapy was on 2/18/2022 when she received Gemzar for chemotherapy       PMH/Home meds:  Metastatic ovarian cancer with mets to liver, spleen and lungs - palliative chemotherapy  Seizure disorder-Lamictal and Keppra  Type 2 diabetes  DVT on Eliquis  Depression  Anemia on iron supplements  Protonix  Percocet for pain     ER Course  On arrival patient afebrile, hypertensive with blood pressure between 030I to 864U systolic, on 2 L of oxygen via nasal cannula. Patient does not use any home oxygen. Patient's lab at time of admission showed sodium 142, potassium 3.4, bicarb 22, creatinine 0.47, glucose 150, lactic acid 2.8 which improved to 1.5  Patient's proBNP 328, troponins 51-57-17  LFTs normal  Blood glucose 115  Prolactin level 4.52  Keppra levels within normal limits  Lamictal levels 1.3  Patient had leukocytosis 20, hemoglobin 11.9 stable, platelets 918.   Blood cultures and urine cultures were sent  UA unremarkable with 2+ glucosuria but no leukocyte esterase or bacteriuria or WBCs. CT chest for PE was negative, no concerns for pneumonia, showed pericardial effusion which was present on previous CTs as well  CT head was negative  X-ray chest was unremarkable  Abdominal x-ray showed ileus  EKG was unremarkable with 467 QTC  Patient received 1 L fluid bolus in the ER and started on fluids. Patient was transferred to medicine service for further management. OBJECTIVE     Vital Signs:  BP (!) 160/80   Pulse 64   Temp 97.4 °F (36.3 °C) (Oral)   Resp 12   Wt 163 lb 12.8 oz (74.3 kg)   SpO2 98%   BMI 27.26 kg/m²     Temp (24hrs), Av.8 °F (36.6 °C), Min:97.4 °F (36.3 °C), Max:98.4 °F (36.9 °C)    In: -   Out: 150 [Urine:150]    Physical Exam:  General:  Awake but confused. HEENT: Atraumatic, normocephalic, no throat congestion, moist mucosa. Hearing loss noted. Eyes:   PERRLA  Neck:   Supple  Chest:   Clear on auscultation. no rales or ronchi  Cardiac:  S1 S2 RR, no gallops or rubs. Systolic murmur heard. Abdomen: Soft, non-tender, no masses or organomegaly. Bowel sounds diminished. :   No suprapubic or flank tenderness. Neuro:  Unable to assess, no focal neurological deficit  SKIN:  No rashes, good skin turgor. Extremities:  No edema.     Medications:  Scheduled Medications:    lamoTRIgine  125 mg Oral BID    levetiracetam  1,000 mg IntraVENous Q12H    nicotine  1 patch TransDERmal Daily    enoxaparin  40 mg SubCUTAneous BID    ampicillin-sulbactam  3,000 mg IntraVENous Q6H    sodium chloride flush  5-40 mL IntraVENous 2 times per day    sodium chloride flush  5-40 mL IntraVENous 2 times per day    pantoprazole  40 mg IntraVENous Daily    And    sodium chloride (PF)  10 mL IntraVENous Daily     Continuous Infusions:    dextrose      sodium chloride      sodium chloride      sodium chloride 75 mL/hr at 22 0108     PRN Medicationsglucose, 15 g, PRN  dextrose, Probable gallstone. Catheter left abdomen and pelvis requires clinical correlation. CT Head WO Contrast    Result Date: 2/20/2022  No acute intracranial abnormality. XR CHEST PORTABLE    Result Date: 2/20/2022  Chronic findings in the chest without acute airspace disease identified. CT CHEST PULMONARY EMBOLISM W CONTRAST    Result Date: 2/20/2022  1. No evidence for acute pulmonary embolism. 2.  Bilateral pulmonary nodules, thoracic lymphadenopathy, soft tissue calcifications in the chest and upper abdomen and sclerotic bone lesions again demonstrated, as described previously, which may be related to the patient's history of malignancy. 3.  Small pericardial effusion. Trace left pleural effusion. 4.  Mild septal thickening suggestive of interstitial edema. CT abdomen from 2/22/2022  Impression   1. Disease progression with increased size of bilateral pulmonary metastases,   slightly increased size of right retrocrural and left inguinal nannette   metastases, and possible slight increase in size of peritoneal metastases. Abdominal and pelvic nannette metastases and skeletal metastases appear   unchanged. 2. Persistent trace bilateral pleural effusions and small pericardial   effusion, likely malignant in etiology. 3. Worsened stricturing of the mid sigmoid colon with no obstruction at this   time.  This may be a sequela of prior treatment to the area.    4. The gastric tube has been retracted with tip now at the gastroesophageal   junction and the sideport not included.  Recommend advancement             ASSESSMENT & PLAN     ASSESSMENT / PLAN:     Principal Problem:    Dehydration  Active Problems:    Seizure ([address]    Type 2 diabetes mellitus without complication, without long-term current use of insulin (HCC)    Encephalopathy acute    Cancer, metastatic to bone (HCC)    Fatigue    Chronic deep vein thrombosis (DVT) of femoral vein of left lower extremity (HCC)    Ovarian cancer ([address]    AMS (altered mental status)    Lactic acidosis    Ileus (HCC)    Elevated troponin    Pericardial effusion    Hypokalemia    Acute respiratory failure with hypoxia (HCC)    Acute metabolic encephalopathy    Confusion    Urinary tract infection without hematuria    Seizure disorder (HCC)    Breakthrough seizure ([address]  Resolved Problems:    * No resolved hospital problems. *    IMPRESSION  This is a 60 y.o. female with a PMH of ovarian cancer with metastasis post chemotherapy/radiotherapy couple of days ago  presented with N/V, chills, fatigue, weakess and altered mental status. Patient also has elevated troponins for which cardiology is on board and new leukocytosis for which hematology is following. Patient admitted to inpatient status for further managament. 1. Dehydration:  Likely secondary to emesis. Continue 0.9% NACL infusion at 75 ml/hr. 2.  Ileus:   Keep NPO with IVF dehydration along with NG tube. Potassium replaced. 3. Seizures:   Resume her home Lamictal and Keppra. EEG ordered. Neurology following. Was on LTM EEG. No seizures recorded. MRI brain pending. Change in mentation likely due to being postictal.  We will continue to monitor. 4. Type 2 diabetes mellitus without complication, without long-term current use of insulin ([facility])    Currently blood glucose under acceptable levels. We will continue to monitor. 5. Leukocytosis:   Was likely secondary to UTI for which patient is on Unasyn. WBC count trended down. 6 . Elevated blood pressure  Likely due to pain and emesis. On IV labetalol and IV hydralazine every 6 hours as needed. 7.  Hypokalemia:   Likely cause of ileus. We will replace potassium. 8.  Acute respiratory failure with hypoxia with unclear etiology:   Likely secondary to pulmonary metastasis. She is currently saturating well above 92% on room air. Will continue to monitor. 9.  Lactic acidosis- resolved.     10.  Chronic DVT of femoral vein of lower extremity: Full dose lovenox. 11.  Recurrent ovarian cancer with extensive metastasis  Chemotherapy using gemcitabine with stable CA-125 and with side effects abdominal pain nausea and vomiting. Heme-onc on board. Appreciate recommendations. MRI brain shows no metastasis.     DVT ppx: already on full dose lovenox  GI ppx: Protonix     PT/OT/SW: Consulted  Discharge Planning: Case management consulted. Lawanda Luz MD  PGY-1, Internal Medicine Resident  [facility], [address] [place] [address]         2/25/2022, 10:52 AM   Attending Physician Statement  I have discussed the care of Joe Cat, including pertinent history and exam findings,  with the resident. I have seen and examined the patient and the key elements of all parts of the encounter have been performed by me. I agree with the assessment, plan and orders as documented by the resident with additions . Treatment plan Discussed with nursing staff in detail , all questions answered . Electronically signed by Lane Velasco MD on   2/25/22 at 1:11 PM EST    Please note that this chart was generated using voice recognition Dragon dictation software. Although every effort was made to ensure the accuracy of this automated transcription, some errors in transcription may have occurred.

## 2022-02-25 NOTE — PROGRESS NOTES
Today's Date: 2/25/2022  Patient Name: Adalid Celis  Date of admission: 2/20/2022  9:53 AM  Patient's age: 59 y.o., 1965  Admission Dx: Dehydration [E86.0]  Confusion [R41.0]  Hypoxia [R09.02]  Fatigue, unspecified type [R53.83]  AMS (altered mental status) [R41.82]    Reason for Consult: management recommendations  Requesting Physician: Selene Alex MD    CHIEF COMPLAINT: Nausea vomiting abdominal pain. History Obtained From:  patient, electronic medical record    Interval history:    Patient seen and examined  Labs and vitals reviewed  Mentation improving gradually  MRI brain does not show any metastasis  No other event per nurse      HISTORY OF PRESENT ILLNESS:      The patient is a 59 y.o.  female who is admitted to the hospital for chief complains abdominal pain nausea vomiting which started yesterday. Patient's oncologic history is as below. Patient also recently had a DVT for which she has been started on Eliquis. Patient had a KUB done which shows findings of ileus. Patient last CT abdomen pelvis from 10/29. Patient is also on antiseizure medication. Patient is admitted with above complaints. Underwent placement of NG tube per IR. Patient is well-known to our practice. She has history of recurrent ovarian cancer. Patient is currently on cytotoxic therapy using Gemzar and has been tolerating it well. Patient last Ca1 25 was relatively stable. Patient CT chest from 2/20/2022 shows bilateral lung nodules thoracic adenopathy and sclerotic bony lesions      Past Medical History:   has a past medical history of Anemia, Bleeding, Cervical cancer ([address] Depression, Diabetes mellitus ([address] GERD (gastroesophageal reflux disease), Hx of blood clots, Hypertension, Metastatic cancer ([address] Ovarian cancer ([address] Post chemo evaluation, and Splenic lesion.     Past Surgical History:   has a past surgical history that includes Hysterectomy, total abdominal; Port Surgery; Tonsillectomy; IR PORT PLACEMENT > 5 YEARS (08/24/2020); Anus surgery; Abscess Drainage (2013); colectomy (03/2013); IR EMBOLIZATION HEMORRHAGE (10/05/2020); and Cardiac catheterization. Medications:    Prior to Admission medications    Medication Sig Start Date End Date Taking? Authorizing Provider   LORazepam (ATIVAN) 1 MG tablet Take 1 tablet by mouth every 8 hours as needed for Anxiety for up to 30 doses. 2/18/22 4/2/22  Angie Gambino MD   polyethylene glycol (MIRALAX) 17 g packet Take 17 g by mouth daily as needed for Constipation 2/18/22 3/20/22  Angie Gambino MD   lamoTRIgine (LAMICTAL) 25 MG tablet TAKE 3 TABS IN IN THE MORNING AND 3 TABS IN IN THE EVENING 2/9/22   Jr Tillman MD   levETIRAcetam (KEPPRA) 750 MG tablet Take 2 tablets by mouth 2 times daily 2/9/22   Melonie Malagon MD   diazePAM 5 MG/0.1ML LIQD 1 puff by Nasal route as needed (seizures)  Patient not taking: Reported on 2/18/2022 2/9/22   Melonie Malagon MD   morphine (MS CONTIN) 30 MG extended release tablet TAKE 1 TABLET BY MOUTH 2 TIMES DAILY FOR 30 DAYS. 1/27/22 2/26/22  Minus Vijaya Bhatia MD   morphine (MS CONTIN) 15 MG extended release tablet TAKE 1 TABLET BY MOUTH 2 TIMES DAILY FOR 30 DAYS.  1/27/22 2/26/22  Angie Gambino MD   oxyCODONE-acetaminophen (PERCOCET) 5-325 MG per tablet TAKE 1 TABLET BY MOUTH EVERY 4 HOURS AS NEEDED FOR PAIN (BREAKTHROUGH PAIN) - REDUCE DOSES TAKEN AS PAIN BECOMES MANAGEABLE 1/27/22 2/26/22  Angie Gambino MD   pantoprazole (PROTONIX) 40 MG tablet Take 1 tablet by mouth daily 1/3/22   Angie Gambino MD   ondansetron (ZOFRAN-ODT) 4 MG disintegrating tablet Take 1 tablet by mouth every 8 hours as needed for Nausea or Vomiting 12/3/21   Angie Gambino MD   naloxegol (MOVANTIK) 12.5 MG TABS tablet Take 1 tablet by mouth every morning  Patient not taking: Reported on 2/18/2022 11/15/21   Angie Gambino MD   ferrous sulfate (IRON 325) 325 (65 Fe) MG tablet Take 1 tablet by mouth daily (with breakfast) 10/31/21   Rosa Velasquez MD   morphine (MSIR) 30 MG tablet Take 30 mg by mouth 2 times daily as needed for Pain.     Historical Provider, MD   sertraline (ZOLOFT) 100 MG tablet TAKE 1 TABLET BY MOUTH DAILY 10/4/21 2/18/22  Barbara Perkins MD   ELIQUIS 5 MG TABS tablet Take 5 mg by mouth 2 times daily  5/26/21   Historical Provider, MD     Current Facility-Administered Medications   Medication Dose Route Frequency Provider Last Rate Last Admin    levETIRAcetam (KEPPRA) 1000 mg/100 mL IVPB  1,000 mg IntraVENous Q12H Leoncio Quan MD   Stopped at 02/25/22 1205    lamoTRIgine (LAMICTAL) tablet 125 mg  125 mg Oral BID May MD Delfino   125 mg at 02/25/22 1408    QUEtiapine (SEROQUEL) tablet 50 mg  50 mg Oral Nightly May MD Delfino        glucose (GLUTOSE) 40 % oral gel 15 g  15 g Oral PRN Brianne Pendleton MD        dextrose 50 % IV solution  12.5 g IntraVENous PRN Brianne Pendleton MD        glucagon (rDNA) injection 1 mg  1 mg IntraMUSCular PRN Brianne Pendleton MD        dextrose 5 % solution  100 mL/hr IntraVENous PRN Brianne Pendleton MD        nicotine (NICODERM CQ) 7 MG/24HR 1 patch  1 patch TransDERmal Daily Maraul MD Roc   1 patch at 02/25/22 1045    labetalol (NORMODYNE;TRANDATE) injection 10 mg  10 mg IntraVENous Q6H PRN Krystle Saleh MD        Or    hydrALAZINE (APRESOLINE) injection 10 mg  10 mg IntraVENous Q6H PRN Krystle Saleh MD        LORazepam (ATIVAN) tablet 0.5 mg  0.5 mg Oral Q4H PRN Krystle Saleh MD   0.5 mg at 02/25/22 1244    bisacodyl (DULCOLAX) suppository 10 mg  10 mg Rectal Daily PRN Krystle Saleh MD        enoxaparin (LOVENOX) injection 40 mg  40 mg SubCUTAneous BID Krystle Saleh MD   40 mg at 02/25/22 1044    albuterol (PROVENTIL) nebulizer solution 2.5 mg  2.5 mg Nebulization As Directed RT PRN Krystle Saleh MD        ampicillin-sulbactam (UNASYN) 3000 mg ivpb minibag  3,000 mg IntraVENous Q6H May MD Delfino 200 mL/hr at 02/25/22 1512 3,000 mg at 02/25/22 1512    sodium chloride flush 0.9 % injection 5-40 mL  5-40 mL IntraVENous 2 times per day [place] Manifold, DO   10 mL at 02/25/22 1050    sodium chloride flush 0.9 % injection 5-40 mL  5-40 mL IntraVENous PRN [place] Manifold, DO        0.9 % sodium chloride infusion  25 mL IntraVENous PRN [place] Manifold, DO        sodium chloride flush 0.9 % injection 5-40 mL  5-40 mL IntraVENous 2 times per day Elsa Cortés MD   10 mL at 02/25/22 1050    sodium chloride flush 0.9 % injection 5-40 mL  5-40 mL IntraVENous PRN Elsa Cortés MD   10 mL at 02/22/22 0918    0.9 % sodium chloride infusion  25 mL IntraVENous PRN Elsa Cortés MD        acetaminophen (TYLENOL) tablet 650 mg  650 mg Oral Q6H PRN Elsa Cortés MD   650 mg at 02/22/22 1625    Or    acetaminophen (TYLENOL) suppository 650 mg  650 mg Rectal Q6H PRN Elsa Cortés MD        0.9 % sodium chloride infusion   IntraVENous Continuous Elsa Cortés MD 75 mL/hr at 02/24/22 0108 New Bag at 02/24/22 0108    potassium chloride (KLOR-CON M) extended release tablet 40 mEq  40 mEq Oral PRN Elsa Cortés MD        Or    potassium bicarb-citric acid (EFFER-K) effervescent tablet 40 mEq  40 mEq Oral PRN Elsa Cortés MD        Or    potassium chloride 10 mEq/100 mL IVPB (Peripheral Line)  10 mEq IntraVENous PRN Elsa Cortés MD        morphine (PF) injection 1 mg  1 mg IntraVENous Q4H PRN Elsa Cortés MD   1 mg at 02/24/22 2203    pantoprazole (PROTONIX) injection 40 mg  40 mg IntraVENous Daily Elsa Cortés MD   40 mg at 02/25/22 1051    And    sodium chloride (PF) 0.9 % injection 10 mL  10 mL IntraVENous Daily Elsa Cortés MD   10 mL at 02/25/22 1049    promethazine (PHENERGAN) tablet 12.5 mg  12.5 mg Oral Q6H PRN Elsa Cortés MD   12.5 mg at 02/20/22 2305    Or    ondansetron (ZOFRAN) injection 4 mg  4 mg IntraVENous Q6H PRN Elsa Cortés MD   4 mg at 02/21/22 8551       Allergies:  Ceftriaxone    Social History:   reports that she has been smoking cigarettes.  She has been smoking about 1.00 pack per day. She uses smokeless tobacco. She reports previous alcohol use. She reports that she does not use drugs. Family History: family history includes Alcohol Abuse in her mother; Cirrhosis in her mother. REVIEW OF SYSTEMS:      Patient not able to give any meaningful history due to altered mental status    PHYSICAL EXAM:        BP (!) 160/80   Pulse 72   Temp 99.4 °F (37.4 °C) (Oral)   Resp 14   Wt 163 lb 12.8 oz (74.3 kg)   SpO2 97%   BMI 27.26 kg/m²    Temp (24hrs), Av.1 °F (36.7 °C), Min:97.4 °F (36.3 °C), Max:99.4 °F (37.4 °C)      General appearance -sick appearing, no in pain or distress   Mental status -confused  Eyes - pupils equal and reactive, extraocular eye movements intact   Ears - bilateral TM's and external ear canals normal   Mouth - mucous membranes moist, pharynx normal without lesions. NG tube in place  Neck - supple, no significant adenopathy   Lymphatics - no palpable lymphadenopathy, no hepatosplenomegaly   Chest - clear to auscultation, no wheezes, rales or rhonchi, symmetric air entry   Heart - normal rate, regular rhythm, normal S1, S2, no murmurs  Abdomen - soft, nontender, nondistended, no masses or organomegaly   Neurological -confused and not following commands  Musculoskeletal - no joint tenderness, deformity or swelling   Extremities - peripheral pulses normal, no pedal edema, no clubbing or cyanosis   Skin - normal coloration and turgor, no rashes, no suspicious skin lesions noted ,      DATA:      Labs:     Results for orders placed or performed during the hospital encounter of 22   Culture, Urine    Specimen: Urine, clean catch   Result Value Ref Range    Specimen Description . CLEAN CATCH URINE     Culture ESCHERICHIA COLI 10 to 50,000 CFU/ML        Susceptibility    Escherichia coli - BACTERIAL SUSCEPTIBILITY PANEL TAQUERIA     ampicillin <=2 Sensitive      aztreonam <=1 Sensitive      ceFAZolin* <=4 Sensitive       * Cefazolin sensitivity results can be used to predict the effectiveness of oral cephalosporins (eg. Cephalexin) in uncomplicated Urinary Tract Infections due to E. coli, K. pneumoniae, and P. mirabilis     cefTRIAXone <=1 Sensitive      ciprofloxacin <=0.25 Sensitive      Confirmatory Extended Spectrum Beta-Lactamase NEGATIVE Negative      gentamicin <=1 Sensitive      nitrofurantoin <=16 Sensitive      tobramycin <=1 Sensitive      trimethoprim-sulfamethoxazole <=20 Sensitive      piperacillin-tazobactam <=4 Sensitive    Culture, Blood 2    Specimen: Blood   Result Value Ref Range    Specimen Description . BLOOD     Special Requests L HAND 2ML     Culture NO GROWTH 5 DAYS    Culture, Blood 1    Specimen: Blood   Result Value Ref Range    Specimen Description . BLOOD     Special Requests R HAND 20ML     Culture NO GROWTH 5 DAYS    CBC with Auto Differential   Result Value Ref Range    WBC 20.0 (H) 3.5 - 11.3 k/uL    RBC 4.08 3.95 - 5.11 m/uL    Hemoglobin 11.9 11.9 - 15.1 g/dL    Hematocrit 39.5 36.3 - 47.1 %    MCV 96.8 82.6 - 102.9 fL    MCH 29.2 25.2 - 33.5 pg    MCHC 30.1 28.4 - 34.8 g/dL    RDW 17.7 (H) 11.8 - 14.4 %    Platelets 725 (H) 201 - 453 k/uL    MPV 9.3 8.1 - 13.5 fL    NRBC Automated 0.0 0.0 per 100 WBC    Differential Type NOT REPORTED     WBC Morphology NOT REPORTED     RBC Morphology NOT REPORTED     Platelet Estimate NOT REPORTED     Immature Granulocytes 0 0 %    Seg Neutrophils 88 (H) 36 - 66 %    Lymphocytes 1 (L) 24 - 44 %    Monocytes 11 (H) 1 - 7 %    Eosinophils % 0 (L) 1 - 4 %    Basophils 0 0 - 2 %    Absolute Immature Granulocyte 0.00 0.00 - 0.30 k/uL    Segs Absolute 17.60 (H) 1.8 - 7.7 k/uL    Absolute Lymph # 0.20 (L) 1.0 - 4.8 k/uL    Absolute Mono # 2.20 (H) 0.1 - 0.8 k/uL    Absolute Eos # 0.00 0.0 - 0.4 k/uL    Basophils Absolute 0.00 0.0 - 0.2 k/uL    Morphology ANISOCYTOSIS PRESENT    Comprehensive Metabolic Panel w/ Reflex to MG   Result Value Ref Range    Glucose 150 (H) 70 - 99 mg/dL BUN 13 6 - 20 mg/dL    CREATININE 0.47 (L) 0.50 - 0.90 mg/dL    Bun/Cre Ratio NOT REPORTED 9 - 20    Calcium 9.0 8.6 - 10.4 mg/dL    Sodium 142 135 - 144 mmol/L    Potassium 3.4 (L) 3.7 - 5.3 mmol/L    Chloride 103 98 - 107 mmol/L    CO2 22 20 - 31 mmol/L    Anion Gap 17 9 - 17 mmol/L    Alkaline Phosphatase 65 35 - 104 U/L    ALT 7 5 - 33 U/L    AST 12 <32 U/L    Total Bilirubin 0.16 (L) 0.3 - 1.2 mg/dL    Total Protein 6.7 6.4 - 8.3 g/dL    Albumin 4.2 3.5 - 5.2 g/dL    Albumin/Globulin Ratio 1.7 1.0 - 2.5    GFR Non-African American >60 >60 mL/min    GFR African American >60 >60 mL/min    GFR Comment          GFR Staging NOT REPORTED    Lipase   Result Value Ref Range    Lipase 13 13 - 60 U/L   Troponin   Result Value Ref Range    Troponin, High Sensitivity 51 (H) 0 - 14 ng/L    Troponin T NOT REPORTED <0.03 ng/mL    Troponin Interp NOT REPORTED    Urinalysis with Microscopic   Result Value Ref Range    Color, UA Yellow Yellow    Turbidity UA Turbid (A) Clear    Glucose, Ur 2+ (A) NEGATIVE    Bilirubin Urine NEGATIVE NEGATIVE    Ketones, Urine NEGATIVE NEGATIVE    Specific Gravity, UA 1.017 1.005 - 1.030    Urine Hgb NEGATIVE NEGATIVE    pH, UA 5.5 5.0 - 8.0    Protein, UA NEGATIVE NEGATIVE    Urobilinogen, Urine Normal Normal    Nitrite, Urine NEGATIVE NEGATIVE    Leukocyte Esterase, Urine NEGATIVE NEGATIVE    -          WBC, UA None 0 - 5 /HPF    RBC, UA 0 TO 2 0 - 4 /HPF    Casts UA NOT REPORTED 0 - 8 /LPF    Crystals, UA NOT REPORTED None /HPF    Epithelial Cells UA 0 TO 2 0 - 5 /HPF    Renal Epithelial, UA NOT REPORTED 0 /HPF    Bacteria, UA NOT REPORTED None    Mucus, UA NOT REPORTED None    Trichomonas, UA NOT REPORTED None    Amorphous, UA NOT REPORTED None    Other Observations UA NOT REPORTED NOT REQ.     Yeast, UA NOT REPORTED None   Lactate, Sepsis   Result Value Ref Range    Lactic Acid, Sepsis NOT REPORTED 0.5 - 1.9 mmol/L    Lactic Acid, Sepsis, Whole Blood 2.8 (H) 0.5 - 1.9 mmol/L   Lactate, Sepsis Result Value Ref Range    Lactic Acid, Sepsis NOT REPORTED 0.5 - 1.9 mmol/L    Lactic Acid, Sepsis, Whole Blood 1.5 0.5 - 1.9 mmol/L   Lamotrigine Level   Result Value Ref Range    Lamotrigine Lvl 1.3 (L) 3.0 - 15.0 ug/mL   Levetiracetam Level   Result Value Ref Range    Levetiracetam Lvl 22 ug/mL   Troponin   Result Value Ref Range    Troponin, High Sensitivity 57 (HH) 0 - 14 ng/L    Troponin T NOT REPORTED <0.03 ng/mL    Troponin Interp NOT REPORTED    Magnesium   Result Value Ref Range    Magnesium 1.8 1.6 - 2.6 mg/dL   Brain Natriuretic Peptide   Result Value Ref Range    Pro- (H) <300 pg/mL    BNP Interpretation NOT REPORTED    Basic Metabolic Panel w/ Reflex to MG   Result Value Ref Range    Glucose 109 (H) 70 - 99 mg/dL    BUN 8 6 - 20 mg/dL    CREATININE 0.39 (L) 0.50 - 0.90 mg/dL    Bun/Cre Ratio NOT REPORTED 9 - 20    Calcium 8.9 8.6 - 10.4 mg/dL    Sodium 137 135 - 144 mmol/L    Potassium 3.6 (L) 3.7 - 5.3 mmol/L    Chloride 103 98 - 107 mmol/L    CO2 22 20 - 31 mmol/L    Anion Gap 12 9 - 17 mmol/L    GFR Non-African American >60 >60 mL/min    GFR African American >60 >60 mL/min    GFR Comment          GFR Staging NOT REPORTED    CBC with Auto Differential   Result Value Ref Range    WBC 14.1 (H) 3.5 - 11.3 k/uL    RBC 3.56 (L) 3.95 - 5.11 m/uL    Hemoglobin 10.5 (L) 11.9 - 15.1 g/dL    Hematocrit 33.7 (L) 36.3 - 47.1 %    MCV 94.7 82.6 - 102.9 fL    MCH 29.5 25.2 - 33.5 pg    MCHC 31.2 28.4 - 34.8 g/dL    RDW 17.4 (H) 11.8 - 14.4 %    Platelets 675 (H) 180 - 453 k/uL    MPV 9.4 8.1 - 13.5 fL    NRBC Automated 0.0 0.0 per 100 WBC    Differential Type NOT REPORTED     WBC Morphology NOT REPORTED     RBC Morphology ANISOCYTOSIS PRESENT     Platelet Estimate NOT REPORTED     Seg Neutrophils 91 (H) 36 - 65 %    Lymphocytes 6 (L) 24 - 43 %    Monocytes 3 3 - 12 %    Eosinophils % 0 (L) 1 - 4 %    Basophils 0 0 - 2 %    Immature Granulocytes 1 (H) 0 %    Segs Absolute 12.77 (H) 1.50 - 8.10 k/uL Absolute Lymph # 0.78 (L) 1.10 - 3.70 k/uL    Absolute Mono # 0.38 0.10 - 1.20 k/uL    Absolute Eos # <0.03 0.00 - 0.44 k/uL    Basophils Absolute 0.05 0.00 - 0.20 k/uL    Absolute Immature Granulocyte 0.08 0.00 - 0.30 k/uL   Lamotrigine Level   Result Value Ref Range    Lamotrigine Lvl 1.3 (L) 3.0 - 15.0 ug/mL   Levetiracetam Level   Result Value Ref Range    Levetiracetam Lvl 4 ug/mL   DRUG SCREEN MULTI URINE   Result Value Ref Range    Amphetamine Screen, Ur NEGATIVE NEGATIVE    Barbiturate Screen, Ur NEGATIVE NEGATIVE    Benzodiazepine Screen, Urine NEGATIVE NEGATIVE    Cocaine Metabolite, Urine NEGATIVE NEGATIVE    Methadone Screen, Urine NEGATIVE NEGATIVE    Opiates, Urine POSITIVE (A) NEGATIVE    Phencyclidine, Urine NEGATIVE NEGATIVE    Propoxyphene, Urine NOT REPORTED NEGATIVE    Cannabinoid Scrn, Ur NEGATIVE NEGATIVE    Oxycodone Screen, Ur NEGATIVE NEGATIVE    Methamphetamine, Urine NOT REPORTED NEGATIVE    Tricyclic Antidepressants, Urine NOT REPORTED NEGATIVE    MDMA, Urine NOT REPORTED NEGATIVE    Buprenorphine Urine NOT REPORTED NEGATIVE    Test Information       Assay provides medical screening only. The absence of expected drug(s) and/or metabolite(s) may indicate diluted or adulterated urine, limitations of testing or timing of collection.    Troponin   Result Value Ref Range    Troponin, High Sensitivity 17 (H) 0 - 14 ng/L    Troponin T NOT REPORTED <0.03 ng/mL    Troponin Interp NOT REPORTED    Troponin   Result Value Ref Range    Troponin, High Sensitivity 14 0 - 14 ng/L    Troponin T NOT REPORTED <0.03 ng/mL    Troponin Interp NOT REPORTED    Prolactin   Result Value Ref Range    Prolactin 4.52 (L) 4.79 - 23.30 ug/L   Procalcitonin   Result Value Ref Range    Procalcitonin 0.25 (H) <0.09 ng/mL   Troponin   Result Value Ref Range    Troponin, High Sensitivity 16 (H) 0 - 14 ng/L    Troponin T NOT REPORTED <0.03 ng/mL    Troponin Interp NOT REPORTED    CK   Result Value Ref Range    Total  26 - 192 U/L   Myoglobin, Serum   Result Value Ref Range    Myoglobin 62 (H) 25 - 58 ng/mL   Basic Metabolic Panel w/ Reflex to MG   Result Value Ref Range    Glucose 103 (H) 70 - 99 mg/dL    BUN 8 6 - 20 mg/dL    CREATININE 0.35 (L) 0.50 - 0.90 mg/dL    Calcium 9.1 8.6 - 10.4 mg/dL    Sodium 137 135 - 144 mmol/L    Potassium 3.6 (L) 3.7 - 5.3 mmol/L    Chloride 100 98 - 107 mmol/L    CO2 23 20 - 31 mmol/L    Anion Gap 14 9 - 17 mmol/L    GFR Non-African American >60 >60 mL/min    GFR African American >60 >60 mL/min    GFR Comment         CBC with Auto Differential   Result Value Ref Range    WBC 14.2 (H) 3.5 - 11.3 k/uL    RBC 3.88 (L) 3.95 - 5.11 m/uL    Hemoglobin 11.2 (L) 11.9 - 15.1 g/dL    Hematocrit 38.7 36.3 - 47.1 %    MCV 99.7 82.6 - 102.9 fL    MCH 28.9 25.2 - 33.5 pg    MCHC 28.9 28.4 - 34.8 g/dL    RDW 17.3 (H) 11.8 - 14.4 %    Platelets 239 (H) 588 - 453 k/uL    MPV 9.6 8.1 - 13.5 fL    NRBC Automated 0.0 0.0 per 100 WBC    RBC Morphology ANISOCYTOSIS PRESENT     Seg Neutrophils 91 (H) 36 - 65 %    Lymphocytes 7 (L) 24 - 43 %    Monocytes 1 (L) 3 - 12 %    Eosinophils % 0 (L) 1 - 4 %    Basophils 1 0 - 2 %    Immature Granulocytes 1 (H) 0 %    Segs Absolute 13.01 (H) 1.50 - 8.10 k/uL    Absolute Lymph # 0.92 (L) 1.10 - 3.70 k/uL    Absolute Mono # 0.15 0.10 - 1.20 k/uL    Absolute Eos # <0.03 0.00 - 0.44 k/uL    Basophils Absolute 0.07 0.00 - 0.20 k/uL    Absolute Immature Granulocyte 0.07 0.00 - 0.30 k/uL   Ferritin   Result Value Ref Range    Ferritin 202 (H) 13 - 150 ug/L   Iron and TIBC   Result Value Ref Range    Iron 46 37 - 145 ug/dL    TIBC 256 250 - 450 ug/dL    Iron Saturation 18 (L) 20 - 55 %    UIBC 210 112 - 347 ug/dL   Vitamin B12 & Folate   Result Value Ref Range    Vitamin B-12 313 232 - 1245 pg/mL    Folate 14.4 >4.8 ng/mL   Magnesium   Result Value Ref Range    Magnesium 1.7 1.6 - 2.6 mg/dL   Basic Metabolic Panel w/ Reflex to MG   Result Value Ref Range    Glucose 89 70 - 99 mg/dL BUN 7 6 - 20 mg/dL    CREATININE 0.33 (L) 0.50 - 0.90 mg/dL    Calcium 8.5 (L) 8.6 - 10.4 mg/dL    Sodium 138 135 - 144 mmol/L    Potassium 3.4 (L) 3.7 - 5.3 mmol/L    Chloride 103 98 - 107 mmol/L    CO2 21 20 - 31 mmol/L    Anion Gap 14 9 - 17 mmol/L    GFR Non-African American >60 >60 mL/min    GFR African American >60 >60 mL/min    GFR Comment         CBC with Auto Differential   Result Value Ref Range    WBC 8.8 3.5 - 11.3 k/uL    RBC 3.54 (L) 3.95 - 5.11 m/uL    Hemoglobin 10.1 (L) 11.9 - 15.1 g/dL    Hematocrit 34.2 (L) 36.3 - 47.1 %    MCV 96.6 82.6 - 102.9 fL    MCH 28.5 25.2 - 33.5 pg    MCHC 29.5 28.4 - 34.8 g/dL    RDW 17.0 (H) 11.8 - 14.4 %    Platelets 380 (H) 814 - 453 k/uL    MPV 9.6 8.1 - 13.5 fL    NRBC Automated 0.0 0.0 per 100 WBC    RBC Morphology ANISOCYTOSIS PRESENT     Seg Neutrophils 85 (H) 36 - 65 %    Lymphocytes 12 (L) 24 - 43 %    Monocytes 2 (L) 3 - 12 %    Eosinophils % 0 (L) 1 - 4 %    Basophils 1 0 - 2 %    Immature Granulocytes 0 0 %    Segs Absolute 7.47 1.50 - 8.10 k/uL    Absolute Lymph # 1.04 (L) 1.10 - 3.70 k/uL    Absolute Mono # 0.16 0.10 - 1.20 k/uL    Absolute Eos # <0.03 0.00 - 0.44 k/uL    Basophils Absolute 0.08 0.00 - 0.20 k/uL    Absolute Immature Granulocyte 0.03 0.00 - 0.30 k/uL   Magnesium   Result Value Ref Range    Magnesium 1.9 1.6 - 2.6 mg/dL   Potassium   Result Value Ref Range    Potassium 3.9 3.7 - 5.3 mmol/L   Magnesium   Result Value Ref Range    Magnesium 1.9 1.6 - 2.6 mg/dL   Lamotrigine Level   Result Value Ref Range    Lamotrigine Lvl 1.5 (L) 3.0 - 15.0 ug/mL   Basic Metabolic Panel w/ Reflex to MG   Result Value Ref Range    Glucose 69 (L) 70 - 99 mg/dL    BUN 7 6 - 20 mg/dL    CREATININE 0.35 (L) 0.50 - 0.90 mg/dL    Calcium 9.0 8.6 - 10.4 mg/dL    Sodium 138 135 - 144 mmol/L    Potassium 3.7 3.7 - 5.3 mmol/L    Chloride 101 98 - 107 mmol/L    CO2 22 20 - 31 mmol/L    Anion Gap 15 9 - 17 mmol/L    GFR Non-African American >60 >60 mL/min    GFR African American >60 >60 mL/min    GFR Comment         CBC with Auto Differential   Result Value Ref Range    WBC 6.7 3.5 - 11.3 k/uL    RBC 3.83 (L) 3.95 - 5.11 m/uL    Hemoglobin 11.1 (L) 11.9 - 15.1 g/dL    Hematocrit 36.8 36.3 - 47.1 %    MCV 96.1 82.6 - 102.9 fL    MCH 29.0 25.2 - 33.5 pg    MCHC 30.2 28.4 - 34.8 g/dL    RDW 16.7 (H) 11.8 - 14.4 %    Platelets 208 (H) 588 - 453 k/uL    MPV 9.7 8.1 - 13.5 fL    NRBC Automated 0.3 (H) 0.0 per 100 WBC    Seg Neutrophils 74 (H) 36 - 65 %    Lymphocytes 19 (L) 24 - 43 %    Monocytes 5 3 - 12 %    Eosinophils % 1 1 - 4 %    Basophils 1 0 - 2 %    Immature Granulocytes 0 0 %    Segs Absolute 4.98 1.50 - 8.10 k/uL    Absolute Lymph # 1.29 1.10 - 3.70 k/uL    Absolute Mono # 0.32 0.10 - 1.20 k/uL    Absolute Eos # 0.04 0.00 - 0.44 k/uL    Basophils Absolute 0.08 0.00 - 0.20 k/uL    Absolute Immature Granulocyte <0.03 0.00 - 0.30 k/uL    RBC Morphology ANISOCYTOSIS PRESENT    Basic Metabolic Panel w/ Reflex to MG   Result Value Ref Range    Glucose 79 70 - 99 mg/dL    BUN 8 6 - 20 mg/dL    CREATININE 0.46 (L) 0.50 - 0.90 mg/dL    Calcium 8.9 8.6 - 10.4 mg/dL    Sodium 139 135 - 144 mmol/L    Potassium 3.4 (L) 3.7 - 5.3 mmol/L    Chloride 104 98 - 107 mmol/L    CO2 21 20 - 31 mmol/L    Anion Gap 14 9 - 17 mmol/L    GFR Non-African American >60 >60 mL/min    GFR African American >60 >60 mL/min    GFR Comment         CBC with Auto Differential   Result Value Ref Range    WBC 3.5 3.5 - 11.3 k/uL    RBC 3.99 3.95 - 5.11 m/uL    Hemoglobin 11.2 (L) 11.9 - 15.1 g/dL    Hematocrit 37.7 36.3 - 47.1 %    MCV 94.5 82.6 - 102.9 fL    MCH 28.1 25.2 - 33.5 pg    MCHC 29.7 28.4 - 34.8 g/dL    RDW 16.5 (H) 11.8 - 14.4 %    Platelets 052 117 - 390 k/uL    MPV 9.7 8.1 - 13.5 fL    NRBC Automated 0.0 0.0 per 100 WBC    RBC Morphology ANISOCYTOSIS PRESENT     Seg Neutrophils 52 36 - 65 %    Lymphocytes 29 24 - 43 %    Monocytes 14 (H) 3 - 12 %    Eosinophils % 1 1 - 4 %    Basophils 2 0 - 2 % Immature Granulocytes 1 (H) 0 %    Segs Absolute 1.85 1.50 - 8.10 k/uL    Absolute Lymph # 1.02 (L) 1.10 - 3.70 k/uL    Absolute Mono # 0.51 0.10 - 1.20 k/uL    Absolute Eos # 0.05 0.00 - 0.44 k/uL    Basophils Absolute 0.08 0.00 - 0.20 k/uL    Absolute Immature Granulocyte <0.03 0.00 - 0.30 k/uL   Magnesium   Result Value Ref Range    Magnesium 1.8 1.6 - 2.6 mg/dL   Levetiracetam Level   Result Value Ref Range    Levetiracetam Lvl 15 ug/mL   POC Glucose Fingerstick   Result Value Ref Range    POC Glucose 173 (H) 65 - 105 mg/dL   POC Glucose Fingerstick   Result Value Ref Range    POC Glucose 79 65 - 105 mg/dL   POC Glucose Fingerstick   Result Value Ref Range    POC Glucose 71 65 - 105 mg/dL   POC Glucose Fingerstick   Result Value Ref Range    POC Glucose 74 65 - 105 mg/dL   POC Glucose Fingerstick   Result Value Ref Range    POC Glucose 96 65 - 105 mg/dL   POC Glucose Fingerstick   Result Value Ref Range    POC Glucose 76 65 - 105 mg/dL   POC Glucose Fingerstick   Result Value Ref Range    POC Glucose 132 (H) 65 - 105 mg/dL   EKG 12 Lead   Result Value Ref Range    Ventricular Rate 90 BPM    Atrial Rate 90 BPM    P-R Interval 162 ms    QRS Duration 90 ms    Q-T Interval 386 ms    QTc Calculation (Bazett) 472 ms    P Axis 49 degrees    R Axis 87 degrees    T Axis 55 degrees   EKG 12 Lead   Result Value Ref Range    Ventricular Rate 99 BPM    Atrial Rate 99 BPM    P-R Interval 172 ms    QRS Duration 92 ms    Q-T Interval 364 ms    QTc Calculation (Bazett) 467 ms    P Axis 45 degrees    R Axis -5 degrees    T Axis 36 degrees   EKG 12 Lead   Result Value Ref Range    Ventricular Rate 64 BPM    Atrial Rate 64 BPM    P-R Interval 152 ms    QRS Duration 88 ms    Q-T Interval 454 ms    QTc Calculation (Bazett) 468 ms    P Axis 42 degrees    R Axis 28 degrees    T Axis 46 degrees         IMAGING DATA:    XR ABDOMEN (KUB) (SINGLE AP VIEW)    Result Date: 2/20/2022  EXAMINATION: ONE SUPINE XRAY VIEW(S) OF THE ABDOMEN 2/20/2022 11:18 pm COMPARISON: January 4, 2021 HISTORY: ORDERING SYSTEM PROVIDED HISTORY: rule out ileus TECHNOLOGIST PROVIDED HISTORY: rule out ileus Reason for Exam: supine,nausea,vomiting FINDINGS: 37 mm calcified lesion right upper quadrant may represent a gallstone. New metallic coils noted in the left upper quadrant. A catheter is noted extending from the left upper quadrant to the pelvis and appears unchanged. Gas-filled loops of small large bowel. Increased stool. Osseous structures normal.  Intravenous contrast is noted in the bladder. Ileus. Probable gallstone. Catheter left abdomen and pelvis requires clinical correlation. CT Head WO Contrast    Result Date: 2/20/2022  EXAMINATION: CT OF THE HEAD WITHOUT CONTRAST  2/20/2022 1:44 pm TECHNIQUE: CT of the head was performed without the administration of intravenous contrast. Dose modulation, iterative reconstruction, and/or weight based adjustment of the mA/kV was utilized to reduce the radiation dose to as low as reasonably achievable. COMPARISON: 06/18/2021, MRI 06/18/2021 HISTORY: ORDERING SYSTEM PROVIDED HISTORY:  Eavl for mets TECHNOLOGIST PROVIDED HISTORY: Eavl for mets Decision Support Exception - unselect if not a suspected or confirmed emergency medical condition->Emergency Medical Condition (MA) Reason for Exam:  Eval for mets FINDINGS: BRAIN/VENTRICLES: There is no acute intracranial hemorrhage, mass effect or midline shift. No abnormal extra-axial fluid collection. The gray-white differentiation is maintained without evidence of an acute infarct. There is no evidence of hydrocephalus. Subtle low attenuation in the deep white matter which is nonspecific likely due to chronic small vessel ischemia. Overall appearance is similar to the prior study. CT with contrast or MRI would be more sensitive to evaluate for metastatic disease. ORBITS: The visualized portion of the orbits demonstrate no acute abnormality.  SINUSES: The visualized paranasal sinuses demonstrate no acute abnormality. Tiny retention cyst versus focal mucosal thickening left maxillary sinus. Minimal fluid/opacification left mastoid air cells. SOFT TISSUES/SKULL:  No acute abnormality of the visualized skull or soft tissues. Similar small right temporal flat skin lesion. No acute intracranial abnormality. XR CHEST PORTABLE    Result Date: 2/20/2022  EXAMINATION: ONE XRAY VIEW OF THE CHEST 2/20/2022 11:51 am COMPARISON: 06/22/2021, 06/21/2021 HISTORY: ORDERING SYSTEM PROVIDED HISTORY: eval for pmn TECHNOLOGIST PROVIDED HISTORY: eval for pmn FINDINGS: The cardiomediastinal silhouette is unchanged in appearance. Right internal jugular port in place. Generalized interstitial prominence again noted. There is no consolidation, pneumothorax, or evidence of edema. No effusion is appreciated. The osseous structures are unchanged in appearance. Vascular coil pack in the left upper quadrant. Chronic findings in the chest without acute airspace disease identified. CT CHEST PULMONARY EMBOLISM W CONTRAST    Result Date: 2/20/2022  EXAMINATION: CTA OF THE CHEST 2/20/2022 1:48 pm TECHNIQUE: CTA of the chest was performed after the administration of intravenous contrast.  Multiplanar reformatted images are provided for review. MIP images are provided for review. Dose modulation, iterative reconstruction, and/or weight based adjustment of the mA/kV was utilized to reduce the radiation dose to as low as reasonably achievable. COMPARISON: CT chest 03/09/2021. CT abdomen and pelvis 10/29/2021. HISTORY: ORDERING SYSTEM PROVIDED HISTORY: eval for pe TECHNOLOGIST PROVIDED HISTORY: eval for pe Decision Support Exception - unselect if not a suspected or confirmed emergency medical condition->Emergency Medical Condition (MA) Reason for Exam: eval for pe FINDINGS: Pulmonary Arteries: Pulmonary arteries are adequately opacified for evaluation.   No evidence of intraluminal filling defect to suggest pulmonary embolism. Main pulmonary artery is normal in caliber. Mediastinum: Mildly enlarged confluent hilar lymph nodes and lymphatic tissue in the AP window and subcarinal regions again demonstrated. Small pericardial effusion, similar in appearance. There is no acute abnormality of the thoracic aorta. Right internal jugular port in place. Lungs/pleura: Respiratory motion artifact noted. Mild septal thickening. Persistent nodular opacity in the left lower lobe on axial image 63 measuring up to 2.6 cm. Scattered ground-glass nodules are again demonstrated bilaterally. The amount of left pleural fluid has diminished in the interval with trace amount remaining. Soft tissue calcification near the supra [place] IVC again demonstrated. Upper Abdomen: Coarse calcification near the medial and inferior aspect of the spleen partially visualized, as seen previously. Dense metallic artifact corresponding to metallic coil pack near the spleen again demonstrated. Cholelithiasis. Soft Tissues/Bones: Blastic metastatic deposits predominantly in the lower thoracic and upper lumbar spine again demonstrated. 1.  No evidence for acute pulmonary embolism. 2.  Bilateral pulmonary nodules, thoracic lymphadenopathy, soft tissue calcifications in the chest and upper abdomen and sclerotic bone lesions again demonstrated, as described previously, which may be related to the patient's history of malignancy. 3.  Small pericardial effusion. Trace left pleural effusion. 4.  Mild septal thickening suggestive of interstitial edema.      IR PLACE NG TUBE BY DR Tima Toure    Result Date: 2/21/2022  PROCEDURE: XR PLACE NASOGASTRIC TUBE PHYS 2/21/2022 HISTORY: ORDERING SYSTEM PROVIDED HISTORY: ileus TECHNOLOGIST PROVIDED HISTORY: ileus Ileus CONTRAST: None SEDATION: None FLUOROSCOPY DOSE AND TYPE OR TIME AND EXPOSURES: Fluoro time 0.7 minutes  cGy cm 2 DESCRIPTION OF PROCEDURE: This procedure was performed by Noble DAVID under indirect supervision of . Buckner protocol was observed. An attempt was made to pass a 12 [place] sump through right and left nostrils without success due to stricture in the nasal cavity. Under fluoroscopic guidance, through the left nostril, a 12 [place] sump nasogastric tube was negotiated into the stomach. With catheter manipulation, the tip of the catheter was manipulated into the distal stomach/proximal duodenum. Air bolus administration confirmed satisfactory tip position. The catheter was secured appropriately at 74 cm. Estimated blood loss was 0 mL. The patient tolerated the procedure well. Successful placement of nasogastric tube. Okay to use.          IMPRESSION:   Primary Problem  Dehydration    Active Hospital Problems    Diagnosis Date Noted    Acute metabolic encephalopathy [Q79.19]     Confusion [R41.0]     Urinary tract infection without hematuria [N39.0]     Seizure disorder ([address] [G40.909]     Breakthrough seizure ([address] [G40.919]     Lactic acidosis [E87.2] 02/21/2022    Ileus ([address] [K56.7] 02/21/2022    Elevated troponin [R77.8] 02/21/2022    Pericardial effusion [I31.3] 02/21/2022    Hypokalemia [E87.6] 02/21/2022    Acute respiratory failure with hypoxia (HCC) [J96.01] 02/21/2022    Dehydration [E86.0] 02/20/2022    AMS (altered mental status) [R41.82] 02/20/2022    Ovarian cancer ([address] [C56.9] 05/29/2021    Chronic deep vein thrombosis (DVT) of femoral vein of left lower extremity ([address] [I82.512] 03/11/2021    Fatigue [R53.83]     Cancer, metastatic to bone ([address] [C79.51] 01/28/2021    Encephalopathy acute [G93.40]     Type 2 diabetes mellitus without complication, without long-term current use of insulin ([address] [E11.9]     Seizure ([address] [R56.9] 10/21/2020       Recurrent ovarian cancer chemotherapy using gemcitabine with stable CA-125  Abdominal pain nausea vomiting  Ileus per KUB  Seizure disorder  DVT of femoral vein on

## 2022-02-25 NOTE — PROGRESS NOTES
Occupational Therapy  Facility/Department: [facility]  Daily Treatment Note  NAME: Laura Botello  : 1965  MRN: [id number]    Date of Service: 2022    Discharge Recommendations:  Patient would benefit from continued therapy after discharge Pt is currently unsafe to return to their prior living arrangements without 24/7 assist/supervision to safely engage in all aspects of ADLs, IADLs and functional mobility tasks. OT Equipment Recommendations  Equipment Needed: Yes  ADL Assistive Devices: Grab Bars - shower    Assessment   Performance deficits / Impairments: Decreased functional mobility ; Decreased ADL status; Decreased endurance;Decreased high-level IADLs;Decreased cognition;Decreased safe awareness;Decreased balance  Assessment: Pt standing with staff upon arrival, pt agitated and demonstrating confusion, attempting to leave unit. OT took over and provided min hand held assist to maintain balance. Max encouragement and education provided on importance of staying in room and returning to bed with poor return. OT retreived phone d/t it calming pt down. Pt then was fixated on phone but was willing to safely return EOB with Min A for stand to sit transfer. Once EOB refused to engage in further ADLs/functional tasks. Pt returned to supine with SBA. Pt is expected to require skilled OT services during their acute hospitalization stay to address the above noted deficits through skilled occupational therapy intervention for promotion of increased independence throughout ADLs, IADLs and functional mobility tasks. Pt is currently unsafe to return to their prior living arrangements without 24/7 assist/supervision to safely engage in all aspects of ADLs, IADLs and functional mobility tasks.    Prognosis: Good  Decision Making: Medium Complexity  Patient Education: Pt educated on OT role, OT POC, activity promotion, safety, transfer training-poor return  Barriers to Learning: decreased cognition  REQUIRES OT FOLLOW UP: Yes  Activity Tolerance  Activity Tolerance: Treatment limited secondary to decreased cognition  Safety Devices  Safety Devices in place: Yes  Type of devices: Gait belt;Patient at risk for falls; Bed alarm in place;Call light within reach; Left in bed;Nurse notified  Restraints  Initially in place: No         Patient Diagnosis(es): The primary encounter diagnosis was Confusion. Diagnoses of Fatigue, unspecified type, Dehydration, and Hypoxia were also pertinent to this visit. has a past medical history of Anemia, Bleeding, Cervical cancer ([facility] Depression, Diabetes mellitus ([address] GERD (gastroesophageal reflux disease), Hx of blood clots, Hypertension, Metastatic cancer ([facility] [address] Ovarian cancer ([facility] [address] Post chemo evaluation, and Splenic lesion. has a past surgical history that includes Hysterectomy, total abdominal; Port Surgery; Tonsillectomy; IR PORT PLACEMENT > 5 YEARS (08/24/2020); Anus surgery; Abscess Drainage (2013); colectomy (03/2013); IR EMBOLIZATION HEMORRHAGE (10/05/2020); and Cardiac catheterization. Restrictions  Restrictions/Precautions  Restrictions/Precautions: Seizure,Fall Risk  Required Braces or Orthoses?: No  Position Activity Restriction  Other position/activity restrictions: Up with assist ;medical history of ovarian cancer with metastasis s/p chemo and radiation therapy; Seizure-like activity 2/23AM-daugther stated pt does present different for 4-5 days post sx typically. Subjective   General  Patient assessed for rehabilitation services?: Yes  Family / Caregiver Present: No  General Comment  Comments: RN staff having significant difficulty getting pt to return to bed, pt attempting to leave room. OT arrived to assist pt, RN ok'd. Pt cooperative throughout with max encouragement, redirection and education.   Vital Signs  Patient Currently in Pain: Denies     Orientation  Orientation  Overall Orientation Status: Impaired  Orientation Level: Oriented to person;Disoriented to situation;Disoriented to place; Disoriented to time     Objective    ADL  Additional Comments: Pt declining any functional/ADL tasks offered and encouraged to complete despite max verbal/tacticle cues and redirection. Pt extremely fixated on phone. Becoming very agitated when disturbed to attempt to engage pt in ADL. Balance  Sitting Balance: Stand by assistance (Seated EOB unsupported for ~4 minutes. Once pt was safely seated, pt fixated on cellphone. Attempted to engage in functional tasks but pt not attending to writer's suggestions and directions.)  Standing Balance: Minimal assistance  Standing Balance  Time: 5 min  Activity: static standing  Comment: decreased balance, hand held assist with BUEs, pt significantly agitated standing wanting to leave unit    Functional Mobility  Functional - Mobility Device: No device  Activity: Other  Assist Level: Minimal assistance  Functional Mobility Comments: Min hand held assist doorway of room to EOB, decreased balance and safety awareness    Bed mobility  Supine to Sit:  (Pt standing with Aide and RN upon arrival, pt attempting to leave room and verbally showing frustration.)  Sit to Supine: Contact guard assistance;Stand by assistance (Required max verbal encouragement to return to supine position w/ significant time for pt to initiate. Motivated by writer placing phone on tray table at the other side of bed in which pt had to climb into bed to then reach for phone.)  Comment: Max encouragement/redirection; HOB elevated ~30; limited by decreased cognition     Transfers  Sit to stand:  (Pt standing upon arrival)  Stand to sit:  (Min A to initiate descent onto bed; hand held assist)        Cognition  Overall Cognitive Status: Exceptions  Arousal/Alertness: Delayed responses to stimuli  Following Commands: Follows one step commands with increased time; Follows one step commands with repetition; Inconsistently follows commands  Attention Span: Maintain attention to functional/ADL activity for 3 minutes w/ <2 verbal cues for redirection to task       Therapy Time   Individual Concurrent Group Co-treatment   Time In 0850         Time Out 0901         Minutes 11         Timed Code Treatment Minutes: [address], OTR/L

## 2022-02-26 ENCOUNTER — APPOINTMENT (OUTPATIENT)
Dept: GENERAL RADIOLOGY | Age: 57
DRG: 388 | End: 2022-02-26
Payer: COMMERCIAL

## 2022-02-26 LAB
ABSOLUTE EOS #: 0.13 K/UL (ref 0–0.44)
ABSOLUTE IMMATURE GRANULOCYTE: 0.03 K/UL (ref 0–0.3)
ABSOLUTE LYMPH #: 1.02 K/UL (ref 1.1–3.7)
ABSOLUTE MONO #: 0.48 K/UL (ref 0.1–1.2)
ANION GAP SERPL CALCULATED.3IONS-SCNC: 10 MMOL/L (ref 9–17)
BASOPHILS # BLD: 2 % (ref 0–2)
BASOPHILS ABSOLUTE: 0.08 K/UL (ref 0–0.2)
BUN BLDV-MCNC: 7 MG/DL (ref 6–20)
CALCIUM SERPL-MCNC: 8.9 MG/DL (ref 8.6–10.4)
CHLORIDE BLD-SCNC: 107 MMOL/L (ref 98–107)
CO2: 25 MMOL/L (ref 20–31)
CREAT SERPL-MCNC: 0.39 MG/DL (ref 0.5–0.9)
EOSINOPHILS RELATIVE PERCENT: 3 % (ref 1–4)
GFR AFRICAN AMERICAN: >60 ML/MIN
GFR NON-AFRICAN AMERICAN: >60 ML/MIN
GFR SERPL CREATININE-BSD FRML MDRD: ABNORMAL ML/MIN/{1.73_M2}
GLUCOSE BLD-MCNC: 140 MG/DL (ref 65–105)
GLUCOSE BLD-MCNC: 83 MG/DL (ref 70–99)
GLUCOSE BLD-MCNC: 92 MG/DL (ref 65–105)
HCT VFR BLD CALC: 35.7 % (ref 36.3–47.1)
HEMOGLOBIN: 10.9 G/DL (ref 11.9–15.1)
IMMATURE GRANULOCYTES: 1 %
LYMPHOCYTES # BLD: 20 % (ref 24–43)
MCH RBC QN AUTO: 29.3 PG (ref 25.2–33.5)
MCHC RBC AUTO-ENTMCNC: 30.5 G/DL (ref 28.4–34.8)
MCV RBC AUTO: 96 FL (ref 82.6–102.9)
MONOCYTES # BLD: 10 % (ref 3–12)
NRBC AUTOMATED: 0 PER 100 WBC
PDW BLD-RTO: 17.3 % (ref 11.8–14.4)
PLATELET # BLD: 356 K/UL (ref 138–453)
PMV BLD AUTO: 9.7 FL (ref 8.1–13.5)
POTASSIUM SERPL-SCNC: 4.2 MMOL/L (ref 3.7–5.3)
RBC # BLD: 3.72 M/UL (ref 3.95–5.11)
RBC # BLD: ABNORMAL 10*6/UL
SEG NEUTROPHILS: 64 % (ref 36–65)
SEGMENTED NEUTROPHILS ABSOLUTE COUNT: 3.25 K/UL (ref 1.5–8.1)
SODIUM BLD-SCNC: 142 MMOL/L (ref 135–144)
WBC # BLD: 5 K/UL (ref 3.5–11.3)

## 2022-02-26 PROCEDURE — 2580000003 HC RX 258: Performed by: STUDENT IN AN ORGANIZED HEALTH CARE EDUCATION/TRAINING PROGRAM

## 2022-02-26 PROCEDURE — 6360000002 HC RX W HCPCS: Performed by: STUDENT IN AN ORGANIZED HEALTH CARE EDUCATION/TRAINING PROGRAM

## 2022-02-26 PROCEDURE — 6370000000 HC RX 637 (ALT 250 FOR IP)

## 2022-02-26 PROCEDURE — 85025 COMPLETE CBC W/AUTO DIFF WBC: CPT

## 2022-02-26 PROCEDURE — 99232 SBSQ HOSP IP/OBS MODERATE 35: CPT | Performed by: PSYCHIATRY & NEUROLOGY

## 2022-02-26 PROCEDURE — C9113 INJ PANTOPRAZOLE SODIUM, VIA: HCPCS | Performed by: STUDENT IN AN ORGANIZED HEALTH CARE EDUCATION/TRAINING PROGRAM

## 2022-02-26 PROCEDURE — 1200000000 HC SEMI PRIVATE

## 2022-02-26 PROCEDURE — 80048 BASIC METABOLIC PNL TOTAL CA: CPT

## 2022-02-26 PROCEDURE — 6370000000 HC RX 637 (ALT 250 FOR IP): Performed by: STUDENT IN AN ORGANIZED HEALTH CARE EDUCATION/TRAINING PROGRAM

## 2022-02-26 PROCEDURE — 82947 ASSAY GLUCOSE BLOOD QUANT: CPT

## 2022-02-26 PROCEDURE — 99232 SBSQ HOSP IP/OBS MODERATE 35: CPT | Performed by: INTERNAL MEDICINE

## 2022-02-26 PROCEDURE — 36415 COLL VENOUS BLD VENIPUNCTURE: CPT

## 2022-02-26 PROCEDURE — 71045 X-RAY EXAM CHEST 1 VIEW: CPT

## 2022-02-26 RX ORDER — LEVOFLOXACIN 500 MG/1
500 TABLET, FILM COATED ORAL DAILY
Status: DISCONTINUED | OUTPATIENT
Start: 2022-02-26 | End: 2022-02-26

## 2022-02-26 RX ORDER — LORAZEPAM 0.5 MG/1
0.5 TABLET ORAL ONCE
Status: COMPLETED | OUTPATIENT
Start: 2022-02-26 | End: 2022-02-26

## 2022-02-26 RX ORDER — LORAZEPAM 1 MG/1
1 TABLET ORAL NIGHTLY PRN
Status: DISCONTINUED | OUTPATIENT
Start: 2022-02-26 | End: 2022-02-26

## 2022-02-26 RX ADMIN — ENOXAPARIN SODIUM 40 MG: 100 INJECTION SUBCUTANEOUS at 08:55

## 2022-02-26 RX ADMIN — LORAZEPAM 0.5 MG: 0.5 TABLET ORAL at 21:25

## 2022-02-26 RX ADMIN — LEVOFLOXACIN 500 MG: 500 TABLET, FILM COATED ORAL at 11:02

## 2022-02-26 RX ADMIN — MORPHINE SULFATE 1 MG: 4 INJECTION INTRAVENOUS at 08:54

## 2022-02-26 RX ADMIN — SODIUM CHLORIDE, PRESERVATIVE FREE 10 ML: 5 INJECTION INTRAVENOUS at 08:55

## 2022-02-26 RX ADMIN — SODIUM CHLORIDE, PRESERVATIVE FREE 10 ML: 5 INJECTION INTRAVENOUS at 09:00

## 2022-02-26 RX ADMIN — LEVETIRACETAM 1000 MG: 10 INJECTION INTRAVENOUS at 11:40

## 2022-02-26 RX ADMIN — ENOXAPARIN SODIUM 40 MG: 100 INJECTION SUBCUTANEOUS at 21:25

## 2022-02-26 RX ADMIN — LAMOTRIGINE 125 MG: 100 TABLET ORAL at 21:26

## 2022-02-26 RX ADMIN — AMPICILLIN AND SULBACTAM 3000 MG: 1; 2 INJECTION, POWDER, FOR SOLUTION INTRAMUSCULAR; INTRAVENOUS at 01:00

## 2022-02-26 RX ADMIN — MORPHINE SULFATE 1 MG: 4 INJECTION INTRAVENOUS at 15:37

## 2022-02-26 RX ADMIN — AMPICILLIN AND SULBACTAM 3000 MG: 1; 2 INJECTION, POWDER, FOR SOLUTION INTRAMUSCULAR; INTRAVENOUS at 06:50

## 2022-02-26 RX ADMIN — LAMOTRIGINE 125 MG: 100 TABLET ORAL at 08:55

## 2022-02-26 RX ADMIN — SODIUM CHLORIDE, PRESERVATIVE FREE 10 ML: 5 INJECTION INTRAVENOUS at 21:26

## 2022-02-26 RX ADMIN — MORPHINE SULFATE 1 MG: 4 INJECTION INTRAVENOUS at 21:25

## 2022-02-26 RX ADMIN — LEVETIRACETAM 1000 MG: 10 INJECTION INTRAVENOUS at 23:22

## 2022-02-26 RX ADMIN — PANTOPRAZOLE SODIUM 40 MG: 40 INJECTION, POWDER, FOR SOLUTION INTRAVENOUS at 08:55

## 2022-02-26 ASSESSMENT — PAIN SCALES - GENERAL
PAINLEVEL_OUTOF10: 9

## 2022-02-26 ASSESSMENT — PAIN DESCRIPTION - PAIN TYPE: TYPE: ACUTE PAIN

## 2022-02-26 ASSESSMENT — PAIN DESCRIPTION - LOCATION: LOCATION: ABDOMEN

## 2022-02-26 NOTE — PROGRESS NOTES
Nutrition Assessment     Type and Reason for Visit: Initial (Length of stay)    Nutrition Recommendations/Plan: Continue pureed diet/thin liquids per SLP recommendation. Continue Ensure ONS. Nutrition Assessment:  Pt initially admitted with AMS, N/V (x 1 day PTA per EHR). Previously had NGT in place d/t ileus, but pt removed on 2/24. Pt passed BSSE with pureed solids and thin liquids. Mental status improving per RN. Pt states her appetite is good. Ate 100% of breakfast. Drinking Ensure ONS. Weight stable PTA. Nutrition Related Findings: meds/labs reviewed      Current Nutrition Therapies:    ADULT DIET; Dysphagia - Pureed  ADULT ORAL NUTRITION SUPPLEMENT; Breakfast, Lunch, Dinner; Standard High Calorie/High Protein Oral Supplement    Anthropometric Measures:  · Height: 5' 5\" (165.1 cm)  · Current Body Wt: 163 lb 12.8 oz (74.3 kg)   · BMI: 27.3    Nutrition Diagnosis:   No nutrition diagnosis at this time     Nutrition Interventions:   Food and/or Nutrient Delivery:  Continue Current Diet,Continue Oral Nutrition Supplement  Nutrition Education/Counseling:  No recommendation at this time   Coordination of Nutrition Care:  No recommendation at this time    Goals:  Meet at least 50% of estimated nutrition needs       Nutrition Monitoring and Evaluation:   Behavioral-Environmental Outcomes:  None Identified   Food/Nutrient Intake Outcomes:  Food and Nutrient Intake,Supplement Intake  Physical Signs/Symptoms Outcomes:  None Identified     Discharge Planning:     Too soon to determine     Electronically signed by Usha Silva MS, RD, LD on 2/26/22 at 10:57 AM EST    Contact: [phone]

## 2022-02-26 NOTE — PLAN OF CARE
Problem: Skin Integrity:  Goal: Absence of new skin breakdown  Description: Absence of new skin breakdown  2/26/2022 0542 by Jolanta Corbin, RN  Outcome: Met This Shift  2/26/2022 0542 by Jolanta Corbin, RN  Outcome: Met This Shift  2/26/2022 0540 by Jolanta Corbin RN  Outcome: Met This Shift     Problem: Falls - Risk of:  Goal: Will remain free from falls  Description: Will remain free from falls  2/26/2022 0542 by Jolanta Corbin, RN  Outcome: Met This Shift  2/26/2022 0542 by Jolanta Corbin, RN  Outcome: Met This Shift  2/26/2022 0540 by Jolanta Corbin, RN  Outcome: Met This Shift  Goal: Absence of physical injury  Description: Absence of physical injury  2/26/2022 0542 by Jolanta Corbin, RN  Outcome: Met This Shift  2/26/2022 0542 by Jolanta Corbin, RN  Outcome: Met This Shift  2/26/2022 0540 by Jolanta Corbin, RN  Outcome: Met This Shift     Problem: Pain:  Goal: Pain level will decrease  Description: Pain level will decrease  2/26/2022 0542 by Jolanta Corbin, RN  Outcome: Ongoing  2/26/2022 0542 by Jolanta Corbin, RN  Outcome: Ongoing  2/26/2022 0540 by Jolanta Corbin RN  Outcome: Ongoing  Goal: Control of acute pain  Description: Control of acute pain  2/26/2022 0542 by Jolanta Corbin, RN  Outcome: Ongoing  2/26/2022 0542 by Jolanta Corbin, RN  Outcome: Ongoing  2/26/2022 0540 by Jolanta Corbin RN  Outcome: Ongoing  Goal: Control of chronic pain  Description: Control of chronic pain  2/26/2022 0542 by Jolanta Corbin, RN  Outcome: Ongoing  2/26/2022 0542 by Jolanta Corbin, RN  Outcome: Ongoing  2/26/2022 0540 by Jolanta Corbin RN  Outcome: Ongoing     Problem: Nausea/Vomiting:  Goal: Absence of nausea/vomiting  Description: Absence of nausea/vomiting  2/26/2022 0542 by Jolanta Corbin, RN  Outcome: Ongoing  2/26/2022 0542 by Jolanta Corbin, RN  Outcome: Ongoing  2/26/2022 0540 by Jolanta Corbin RN  Outcome: Ongoing  Goal: Able to drink  Description: Able to drink  2/26/2022 0542 by Jolanta Corbin RN  Outcome: Ongoing  2/26/2022 0542 by Alanna Cardenas RN  Outcome: Ongoing  2/26/2022 0540 by Alanna Cardenas RN  Outcome: Ongoing  Goal: Able to eat  Description: Able to eat  2/26/2022 0542 by Alanna Cardenas RN  Outcome: Ongoing  2/26/2022 0542 by Alanna Cardenas RN  Outcome: Ongoing  2/26/2022 0540 by Alanna Cardenas RN  Outcome: Ongoing  Goal: Ability to achieve adequate nutritional intake will improve  Description: Ability to achieve adequate nutritional intake will improve  2/26/2022 0542 by Alanna Cardenas RN  Outcome: Ongoing  2/26/2022 0542 by Alanna Cardenas RN  Outcome: Ongoing  2/26/2022 0540 by Alanna Cardenas RN  Outcome: Ongoing     Problem: Infection - Central Venous Catheter-Associated Bloodstream Infection:  Goal: Will show no infection signs and symptoms  Description: Will show no infection signs and symptoms  2/26/2022 0542 by Alanna Cardenas RN  Outcome: Ongoing  2/26/2022 0542 by Alanna Cardenas RN  Outcome: Ongoing  2/26/2022 0540 by Alanna Cardenas RN  Outcome: Ongoing     Problem: Skin Integrity:  Goal: Will show no infection signs and symptoms  Description: Will show no infection signs and symptoms  2/26/2022 0542 by Alanna Cardenas RN  Outcome: Ongoing  2/26/2022 0542 by Alanna Cardenas RN  Outcome: Ongoing  2/26/2022 0540 by Alanna Cardenas RN  Outcome: Ongoing     Problem: Mental Status - Impaired:  Goal: Mental status will improve  Description: Mental status will improve  Outcome: Ongoing

## 2022-02-26 NOTE — PROGRESS NOTES
[facility]  Internal Medicine Teaching Residency Program  Inpatient Daily Progress Note  ______________________________________________________________________________    Patient: Nya Hull [facility]  YOB: 1965   [id number]    Acct: [de-identified]     Room: [address] [place] [address]  Admit date: 2/20/2022  Today's date: 02/26/22  Number of days in the hospital: 6    SUBJECTIVE   Admitting Diagnosis: Dehydration  CC: Altered mental status, Nausea and vomiting. Patient was seen and examined at bedside. Chart and results reviewed. She has auditory and visual hallucination overnight. Was stable in the morning AAO x4. Blood pressure is 140/50. Pulse eighty. Saturating on 2 L nasal cannula. Labs reviewed and evaluated. Leukocytosis resolved. Stop Seroquel and started on Ativan and melatonin nightly. ROS:  Constitutional:  negative for chills, fevers, sweats  Respiratory:  negative for cough, hemoptysis, wheezing  Cardiovascular:  negative for chest pain, chest pressure/discomfort, lower extremity edema, palpitations  Gastrointestinal:  negative for abdominal pain, constipation, diarrhea, nausea, vomiting  Neurological: Positive for auditory and visual hallucination.      BRIEF HISTORY     The patient is a 57-year-old female with significant past medical history of ovarian cancer with metastasis s/p chemo and radiotherapy, seizure disorder on Lamictal and Keppra who presents with altered mental status, nausea and vomiting. 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 She had surgical debulking and systemic treatment with Taxol and carboplatin for 6 cycles. She was in remission for about 2 years. She had a relapse in 2007 and was treated with topotecan with good results. Patient relapsed again in 2008 and was treated with Taxol and carboplatin.   After 3 cycles of systemic chemotherapy she had problems with carboplatin so she finished 3 more cycles with Taxol. She did well again for 2 to 3 years and then she had a relapse in 2012 and had intraperitoneal chemotherapy treatment with Taxol. Patient was last seen by her oncologist in 2014 at which time she had relatively stable disease with normal tumor marker and no significant abnormal images. Patient moved to [place] after that and she was lost for oncology follow-ups. Patient states that she came back to [facility] 3 years ago and has been following with an oncologist since. Patient states her last chemotherapy was last week on February 18 2, 2022. She received Gemzar for chemotherapy and nausea, vomiting, diarrhea, mouth sores, drowsiness, muscle aches are some of the known side effects of Gemzar. Reimaging confirmed metastatic relapse and biopsy confirmed ovarian cancer recurrence. Scan showed extensive intra-abdominal and splenic involvement and lung metastasis. CT brain was unremarkable on 2/20/2022. Patient currently has extensive intra-abdominal, splenic and lung involvement from ovarian cancer. Patient with recent intra-abdominal bleeding due to splenic mass with GI infiltration s/p embolization. She is currently on palliative treatment with Doxil. Patient recently had a DVT last week for which she was started on Eliquis. Patient's last chemotherapy was on 2/18/2022 when she received Gemzar for chemotherapy       PMH/Home meds:  Metastatic ovarian cancer with mets to liver, spleen and lungs - palliative chemotherapy  Seizure disorder-Lamictal and Keppra  Type 2 diabetes  DVT on Eliquis  Depression  Anemia on iron supplements  Protonix  Percocet for pain     ER Course  On arrival patient afebrile, hypertensive with blood pressure between 649W to 362D systolic, on 2 L of oxygen via nasal cannula. Patient does not use any home oxygen.   Patient's lab at time of admission showed sodium 142, potassium 3.4, bicarb 22, creatinine 0.47, glucose 150, lactic acid 2.8 which improved to 1.5  Patient's proBNP 328, troponins 51-57-17  LFTs normal  Blood glucose 115  Prolactin level 4.52  Keppra levels within normal limits  Lamictal levels 1.3  Patient had leukocytosis 20, hemoglobin 11.9 stable, platelets 186. Blood cultures and urine cultures were sent  UA unremarkable with 2+ glucosuria but no leukocyte esterase or bacteriuria or WBCs. CT chest for PE was negative, no concerns for pneumonia, showed pericardial effusion which was present on previous CTs as well  CT head was negative  X-ray chest was unremarkable  Abdominal x-ray showed ileus  EKG was unremarkable with 467 QTC  Patient received 1 L fluid bolus in the ER and started on fluids. Patient was transferred to medicine service for further management. OBJECTIVE     Vital Signs:  /62   Pulse 67   Temp 98.4 °F (36.9 °C) (Oral)   Resp 16   Ht 5' 5\" (1.651 m)   Wt 163 lb 12.8 oz (74.3 kg)   SpO2 97%   BMI 27.26 kg/m²     Temp (24hrs), Av.6 °F (37 °C), Min:98.1 °F (36.7 °C), Max:99.4 °F (37.4 °C)    In: 1234   Out: -     Physical Exam:  General:  Awake but confused. HEENT: Atraumatic, normocephalic, no throat congestion, moist mucosa. Hearing loss noted. Eyes:   PERRLA  Neck:   Supple  Chest:   Clear on auscultation. no rales or ronchi  Cardiac:  S1 S2 RR, no gallops or rubs. Systolic murmur heard. Abdomen: Soft, non-tender, no masses or organomegaly. Bowel sounds diminished. :   No suprapubic or flank tenderness. Neuro:  Unable to assess, no focal neurological deficit  SKIN:  No rashes, good skin turgor. Extremities:  No edema.     Medications:  Scheduled Medications:    levetiracetam  1,000 mg IntraVENous Q12H    lamoTRIgine  125 mg Oral BID    nicotine  1 patch TransDERmal Daily    enoxaparin  40 mg SubCUTAneous BID    sodium chloride flush  5-40 mL IntraVENous 2 times per day    sodium chloride flush  5-40 mL IntraVENous 2 times per day    pantoprazole  40 mg IntraVENous Daily    And    sodium chloride (PF)  10 mL IntraVENous Daily     Continuous Infusions:    dextrose      sodium chloride      sodium chloride       PRN Medicationsmelatonin, 5 mg, Nightly PRN  glucose, 15 g, PRN  dextrose, 12.5 g, PRN  glucagon (rDNA), 1 mg, PRN  dextrose, 100 mL/hr, PRN  labetalol, 10 mg, Q6H PRN   Or  hydrALAZINE, 10 mg, Q6H PRN  LORazepam, 0.5 mg, Q4H PRN  bisacodyl, 10 mg, Daily PRN  albuterol, 2.5 mg, As Directed RT PRN  sodium chloride flush, 5-40 mL, PRN  sodium chloride, 25 mL, PRN  sodium chloride flush, 5-40 mL, PRN  sodium chloride, 25 mL, PRN  acetaminophen, 650 mg, Q6H PRN   Or  acetaminophen, 650 mg, Q6H PRN  potassium chloride, 40 mEq, PRN   Or  potassium alternative oral replacement, 40 mEq, PRN   Or  potassium chloride, 10 mEq, PRN  morphine, 1 mg, Q4H PRN  promethazine, 12.5 mg, Q6H PRN   Or  ondansetron, 4 mg, Q6H PRN        Diagnostic Labs:  CBC:   Recent Labs     02/24/22  0521 02/25/22  0610 02/26/22  0721   WBC 6.7 3.5 5.0   RBC 3.83* 3.99 3.72*   HGB 11.1* 11.2* 10.9*   HCT 36.8 37.7 35.7*   MCV 96.1 94.5 96.0   RDW 16.7* 16.5* 17.3*   * 428 356     BMP:   Recent Labs     02/24/22  0521 02/25/22  0610 02/26/22  0721    139 142   K 3.7 3.4* 4.2    104 107   CO2 22 21 25   BUN 7 8 7   CREATININE 0.35* 0.46* 0.39*     BNP: No results for input(s): BNP in the last 72 hours. PT/INR: No results for input(s): PROTIME, INR in the last 72 hours. APTT: No results for input(s): APTT in the last 72 hours. CARDIAC ENZYMES: No results for input(s): CKMB, CKMBINDEX, TROPONINI in the last 72 hours. Invalid input(s): CKTOTAL;3  FASTING LIPID PANEL:  Lab Results   Component Value Date    CHOL 131 03/10/2021    HDL 33 (L) 03/10/2021    TRIG 147 03/10/2021     LIVER PROFILE:   No results for input(s): AST, ALT, ALB, BILIDIR, BILITOT, ALKPHOS in the last 72 hours.    MICROBIOLOGY:   Lab Results   Component Value Date/Time    CULTURE NO GROWTH 5 DAYS 02/20/2022 11:20 AM Imaging:    XR ABDOMEN (KUB) (SINGLE AP VIEW)    Result Date: 2/20/2022  Ileus. Probable gallstone. Catheter left abdomen and pelvis requires clinical correlation. CT Head WO Contrast    Result Date: 2/20/2022  No acute intracranial abnormality. XR CHEST PORTABLE    Result Date: 2/20/2022  Chronic findings in the chest without acute airspace disease identified. CT CHEST PULMONARY EMBOLISM W CONTRAST    Result Date: 2/20/2022  1. No evidence for acute pulmonary embolism. 2.  Bilateral pulmonary nodules, thoracic lymphadenopathy, soft tissue calcifications in the chest and upper abdomen and sclerotic bone lesions again demonstrated, as described previously, which may be related to the patient's history of malignancy. 3.  Small pericardial effusion. Trace left pleural effusion. 4.  Mild septal thickening suggestive of interstitial edema. CT abdomen from 2/22/2022  Impression   1. Disease progression with increased size of bilateral pulmonary metastases,   slightly increased size of right retrocrural and left inguinal nannette   metastases, and possible slight increase in size of peritoneal metastases. Abdominal and pelvic nannette metastases and skeletal metastases appear   unchanged. 2. Persistent trace bilateral pleural effusions and small pericardial   effusion, likely malignant in etiology. 3. Worsened stricturing of the mid sigmoid colon with no obstruction at this   time.  This may be a sequela of prior treatment to the area.    4. The gastric tube has been retracted with tip now at the gastroesophageal   junction and the sideport not included.  Recommend advancement             ASSESSMENT & PLAN     ASSESSMENT / PLAN:     Principal Problem:    Dehydration  Active Problems:    Seizure ([facility] [address]    Type 2 diabetes mellitus without complication, without long-term current use of insulin (HCC)    Encephalopathy acute    Cancer, metastatic to bone (HCC)    Fatigue    Chronic deep vein thrombosis (DVT) of femoral vein of left lower extremity (HCC)    Ovarian cancer ([address]    AMS (altered mental status)    Lactic acidosis    Ileus (HCC)    Elevated troponin    Pericardial effusion    Hypokalemia    Acute respiratory failure with hypoxia (HCC)    Acute metabolic encephalopathy    Confusion    Urinary tract infection without hematuria    Seizure disorder (HCC)    Breakthrough seizure ([address]  Resolved Problems:    * No resolved hospital problems. *    IMPRESSION  This is a 60 y.o. female with a PMH of ovarian cancer with metastasis post chemotherapy/radiotherapy couple of days ago  presented with N/V, chills, fatigue, weakess and altered mental status. Patient also has elevated troponins for which cardiology is on board and new leukocytosis for which hematology is following. Patient admitted to inpatient status for further managament. Acute metabolic encephalopathy: Resolving  Secondary to dehydration versus electrolyte abnormalities versus UTI.  UTI is treated with Unasyn, completed 5 days of course. Leukocytosis resolved. Urine culture grew E. coli sensitive to Unasyn. Dehydration has resolved with IV fluid and electrolytes are replaced. Still having auditory visual hallucination. Could be related to Seroquel she received. Seroquel is discontinued and home Ativan is resumed. Neurology on board and antiseizure medication readjusted. Seizures:   MRI does not show acute abnormality. EEG showed diffuse polymorphic theta slowing. Frequent left occipital temporal lateralized periodic discharges noted. Neurology on board and recommend Keppra dose adjusted to 1000 mg bid orally due to behavioral changes that could be related to keppra. Lamictal 125 mg bid   keppra levels therapeutic level at 15 (nl 6-46)    Recurrent ovarian cancer with extensive metastasis  Chemotherapy using gemcitabine with stable CA-125 and with side effects abdominal pain nausea and vomiting. Heme-onc on board.   Appreciate recommendations. MRI brain shows no metastasis. Chronic DVT of femoral vein of lower extremity:   Full dose lovenox. Hypokalemia: Resolved    Ileus:   Secondary to hypokalemia-replaced. NG tube is removed and ileus is resolved. Acute respiratory failure with hypoxia with unclear etiology: Resolved. Likely secondary to pulmonary metastasis. She is currently saturating well above 92% on room air. Will continue to monitor. Lactic acidosis- resolved. DVT ppx: already on full dose lovenox  GI ppx: Protonix     PT/OT/SW: Consulted  Discharge Planning: Case management consulted.     Nevaeh Cooper MD  Internal Medicine Resident, PGY-1  [address]  2/57/0132,5:74 PM

## 2022-02-26 NOTE — PLAN OF CARE
Problem: Pain:  Goal: Pain level will decrease  Description: Pain level will decrease  2/26/2022 1811 by Juli Wylie, RN  Outcome: Ongoing  2/26/2022 0542 by Garon Plants, RN  Outcome: Ongoing  2/26/2022 0542 by Garon Plants, RN  Outcome: Ongoing  2/26/2022 0540 by Garon Shaquille, RN  Outcome: Ongoing  Goal: Control of acute pain  Description: Control of acute pain  2/26/2022 1811 by Juli Wylie, RN  Outcome: Ongoing  2/26/2022 0542 by Garon Plants, RN  Outcome: Ongoing  2/26/2022 0542 by Garon Plants, RN  Outcome: Ongoing  2/26/2022 0540 by Maxon Shaquille, RN  Outcome: Ongoing  Goal: Control of chronic pain  Description: Control of chronic pain  2/26/2022 1811 by Juli Wylie, RN  Outcome: Ongoing  2/26/2022 0542 by Garon Shaquille, RN  Outcome: Ongoing  2/26/2022 0542 by Maxon Shaquille, RN  Outcome: Ongoing  2/26/2022 0540 by Jolanta Corbin, RN  Outcome: Ongoing     Problem: Nausea/Vomiting:  Goal: Absence of nausea/vomiting  Description: Absence of nausea/vomiting  2/26/2022 1811 by Juli Wylie, RN  Outcome: Ongoing  2/26/2022 0542 by Jolanta Corbin, RN  Outcome: Ongoing  2/26/2022 0542 by Maxon Shaquille, RN  Outcome: Ongoing  2/26/2022 0540 by Maxon Shaquille, RN  Outcome: Ongoing  Goal: Able to drink  Description: Able to drink  2/26/2022 1811 by Juli Wylie, RN  Outcome: Ongoing  2/26/2022 0542 by Garon Shaquille, RN  Outcome: Ongoing  2/26/2022 0542 by Maxon Shaquille, RN  Outcome: Ongoing  2/26/2022 0540 by Jolanta Corbin, RN  Outcome: Ongoing  Goal: Able to eat  Description: Able to eat  2/26/2022 1811 by Juli Wylie, RN  Outcome: Ongoing  2/26/2022 0542 by Garon Shaquille, RN  Outcome: Ongoing  2/26/2022 0542 by Garon Shaquille, RN  Outcome: Ongoing  2/26/2022 0540 by Jolanta Corbin, RN  Outcome: Ongoing  Goal: Ability to achieve adequate nutritional intake will improve  Description: Ability to achieve adequate nutritional intake will improve  2/26/2022 1811 by Juli Wylie RN  Outcome: Ongoing  2/26/2022 3904 by Tone Welsh RN  Outcome: Ongoing  2/26/2022 0542 by Tone Welsh RN  Outcome: Ongoing  2/26/2022 0540 by Tone Welsh RN  Outcome: Ongoing     Problem: Infection - Central Venous Catheter-Associated Bloodstream Infection:  Goal: Will show no infection signs and symptoms  Description: Will show no infection signs and symptoms  2/26/2022 1811 by Leanna Echols RN  Outcome: Ongoing  2/26/2022 0542 by Tone Welsh RN  Outcome: Ongoing  2/26/2022 0542 by Tone Welsh RN  Outcome: Ongoing  2/26/2022 0540 by Tone Welsh RN  Outcome: Ongoing     Problem: Skin Integrity:  Goal: Will show no infection signs and symptoms  Description: Will show no infection signs and symptoms  2/26/2022 1811 by Leanna Echols RN  Outcome: Ongoing  2/26/2022 0542 by Tone Welsh RN  Outcome: Ongoing  2/26/2022 0542 by Tone Welsh RN  Outcome: Ongoing  2/26/2022 0540 by Tone Welsh RN  Outcome: Ongoing  Goal: Absence of new skin breakdown  Description: Absence of new skin breakdown  2/26/2022 1811 by Leanna Echols RN  Outcome: Ongoing  2/26/2022 0542 by Tone Welsh RN  Outcome: Met This Shift  2/26/2022 0542 by Tone Welsh RN  Outcome: Met This Shift  2/26/2022 0540 by Tone Welsh RN  Outcome: Met This Shift     Problem: Falls - Risk of:  Goal: Will remain free from falls  Description: Will remain free from falls  2/26/2022 1811 by Leanna Echols RN  Outcome: Ongoing  2/26/2022 0542 by Tone Welsh RN  Outcome: Met This Shift  2/26/2022 0542 by Tone Welsh RN  Outcome: Met This Shift  2/26/2022 0540 by Tone Welsh RN  Outcome: Met This Shift  Goal: Absence of physical injury  Description: Absence of physical injury  2/26/2022 1811 by Leanna Echols RN  Outcome: Ongoing  2/26/2022 0542 by Tone Welsh RN  Outcome: Met This Shift  2/26/2022 0542 by Tone Welsh RN  Outcome: Met This Shift  2/26/2022 0540 by Tone Welsh RN  Outcome: Met This Shift     Problem: Musculor/Skeletal Functional Status  Goal: Highest potential functional level  Outcome: Ongoing     Problem: Mental Status - Impaired:  Goal: Mental status will improve  Description: Mental status will improve  2/26/2022 1811 by Isaiah Shipman RN  Outcome: Ongoing  2/26/2022 0542 by Ashlyn Martinez RN  Outcome: Ongoing

## 2022-02-26 NOTE — PROGRESS NOTES
Today's Date: 2/26/2022  Patient Name: Migdalia Kumar  Date of admission: 2/20/2022  9:53 AM  Patient's age: 59 y.o., 1965  Admission Dx: Dehydration [E86.0]  Confusion [R41.0]  Hypoxia [R09.02]  Fatigue, unspecified type [R53.83]  AMS (altered mental status) [R41.82]    Reason for Consult: management recommendations  Requesting Physician: Lewis Fleming MD    CHIEF COMPLAINT: Nausea vomiting abdominal pain. History Obtained From:  patient, electronic medical record    Interval history:    Patient seen and examined  Labs and vitals reviewed  Patient is much more awake and alert. She is able to carry out a conversation  X-ray does not show any acute abnormality. Hemoglobin 10.9 today. HISTORY OF PRESENT ILLNESS:      The patient is a 59 y.o.  female who is admitted to the hospital for chief complains abdominal pain nausea vomiting which started yesterday. Patient's oncologic history is as below. Patient also recently had a DVT for which she has been started on Eliquis. Patient had a KUB done which shows findings of ileus. Patient last CT abdomen pelvis from 10/29. Patient is also on antiseizure medication. Patient is admitted with above complaints. Underwent placement of NG tube per IR. Patient is well-known to our practice. She has history of recurrent ovarian cancer. Patient is currently on cytotoxic therapy using Gemzar and has been tolerating it well. Patient last Ca1 25 was relatively stable. Patient CT chest from 2/20/2022 shows bilateral lung nodules thoracic adenopathy and sclerotic bony lesions      Past Medical History:   has a past medical history of Anemia, Bleeding, Cervical cancer ([address] Depression, Diabetes mellitus ([address] GERD (gastroesophageal reflux disease), Hx of blood clots, Hypertension, Metastatic cancer ([address] Ovarian cancer ([address] Post chemo evaluation, and Splenic lesion.     Past Surgical History:   has a past surgical history that includes Hysterectomy, total abdominal; Port Surgery; Tonsillectomy; IR PORT PLACEMENT > 5 YEARS (08/24/2020); Anus surgery; Abscess Drainage (2013); colectomy (03/2013); IR EMBOLIZATION HEMORRHAGE (10/05/2020); and Cardiac catheterization. Medications:    Prior to Admission medications    Medication Sig Start Date End Date Taking? Authorizing Provider   LORazepam (ATIVAN) 1 MG tablet Take 1 tablet by mouth every 8 hours as needed for Anxiety for up to 30 doses. 2/18/22 4/2/22  Lottie Kayser, MD   polyethylene glycol (MIRALAX) 17 g packet Take 17 g by mouth daily as needed for Constipation 2/18/22 3/20/22  Lottie Kayser, MD   lamoTRIgine (LAMICTAL) 25 MG tablet TAKE 3 TABS IN IN THE MORNING AND 3 TABS IN IN THE EVENING 2/9/22   Jr Raya MD   levETIRAcetam (KEPPRA) 750 MG tablet Take 2 tablets by mouth 2 times daily 2/9/22   Maryanne Hanson MD   diazePAM 5 MG/0.1ML LIQD 1 puff by Nasal route as needed (seizures)  Patient not taking: Reported on 2/18/2022 2/9/22   Maryanne Hanson MD   morphine (MS CONTIN) 30 MG extended release tablet TAKE 1 TABLET BY MOUTH 2 TIMES DAILY FOR 30 DAYS. 1/27/22 2/26/22  Ortiz Bhatia MD   morphine (MS CONTIN) 15 MG extended release tablet TAKE 1 TABLET BY MOUTH 2 TIMES DAILY FOR 30 DAYS.  1/27/22 2/26/22  Lottie Kayser, MD   oxyCODONE-acetaminophen (PERCOCET) 5-325 MG per tablet TAKE 1 TABLET BY MOUTH EVERY 4 HOURS AS NEEDED FOR PAIN (BREAKTHROUGH PAIN) - REDUCE DOSES TAKEN AS PAIN BECOMES MANAGEABLE 1/27/22 2/26/22  Lottie Kayser, MD   pantoprazole (PROTONIX) 40 MG tablet Take 1 tablet by mouth daily 1/3/22   Lottie Kayser, MD   ondansetron (ZOFRAN-ODT) 4 MG disintegrating tablet Take 1 tablet by mouth every 8 hours as needed for Nausea or Vomiting 12/3/21   Lottie Kayser, MD   naloxegol (MOVANTIK) 12.5 MG TABS tablet Take 1 tablet by mouth every morning  Patient not taking: Reported on 2/18/2022 11/15/21   Lottie Kayser, MD ferrous sulfate (IRON 325) 325 (65 Fe) MG tablet Take 1 tablet by mouth daily (with breakfast) 10/31/21   Clotilde Gil MD   morphine (MSIR) 30 MG tablet Take 30 mg by mouth 2 times daily as needed for Pain.     Historical Provider, MD   sertraline (ZOLOFT) 100 MG tablet TAKE 1 TABLET BY MOUTH DAILY 10/4/21 2/18/22  Meenu Ruggiero MD   ELIQUIS 5 MG TABS tablet Take 5 mg by mouth 2 times daily  5/26/21   Historical Provider, MD     Current Facility-Administered Medications   Medication Dose Route Frequency Provider Last Rate Last Admin    levETIRAcetam (KEPPRA) 1000 mg/100 mL IVPB  1,000 mg IntraVENous Q12H Amrit Adames  mL/hr at 02/26/22 1140 1,000 mg at 02/26/22 1140    lamoTRIgine (LAMICTAL) tablet 125 mg  125 mg Oral BID Simona Phipps MD   125 mg at 02/26/22 0855    melatonin tablet 5 mg  5 mg Oral Nightly PRN Theodore Fisher MD        glucose (GLUTOSE) 40 % oral gel 15 g  15 g Oral PRN Jose Clemons MD        dextrose 50 % IV solution  12.5 g IntraVENous PRN Jose Clemons MD        glucagon (rDNA) injection 1 mg  1 mg IntraMUSCular PRN Jose Clemons MD        dextrose 5 % solution  100 mL/hr IntraVENous PRN Jose Clemons MD        nicotine (NICODERM CQ) 7 MG/24HR 1 patch  1 patch TransDERmal Daily Lisa Soto MD   1 patch at 02/26/22 0900    labetalol (NORMODYNE;TRANDATE) injection 10 mg  10 mg IntraVENous Q6H PRN Simona Phipps MD        Or    hydrALAZINE (APRESOLINE) injection 10 mg  10 mg IntraVENous Q6H PRN Simona Phipps MD        LORazepam (ATIVAN) tablet 0.5 mg  0.5 mg Oral Q4H PRN Simona Phipps MD   0.5 mg at 02/25/22 1244    bisacodyl (DULCOLAX) suppository 10 mg  10 mg Rectal Daily PRN Simona Phipps MD        enoxaparin (LOVENOX) injection 40 mg  40 mg SubCUTAneous BID Simona Phipps MD   40 mg at 02/26/22 0855    albuterol (PROVENTIL) nebulizer solution 2.5 mg  2.5 mg Nebulization As Directed RT PRN Flogloria Phipps MD        sodium chloride flush 0.9 % injection 5-40 mL  5-40 mL IntraVENous 2 times per day Carron Solum, DO   10 mL at 02/25/22 1050    sodium chloride flush 0.9 % injection 5-40 mL  5-40 mL IntraVENous PRN Carron Solum, DO        0.9 % sodium chloride infusion  25 mL IntraVENous PRN Carron Solum, DO        sodium chloride flush 0.9 % injection 5-40 mL  5-40 mL IntraVENous 2 times per day Rubi Spears MD   10 mL at 02/26/22 0855    sodium chloride flush 0.9 % injection 5-40 mL  5-40 mL IntraVENous PRN Rubi Spears MD   10 mL at 02/22/22 0918    0.9 % sodium chloride infusion  25 mL IntraVENous PRN Rubi Spears MD        acetaminophen (TYLENOL) tablet 650 mg  650 mg Oral Q6H PRN Rubi Spears MD   650 mg at 02/22/22 1625    Or    acetaminophen (TYLENOL) suppository 650 mg  650 mg Rectal Q6H PRN Rubi Spears MD        potassium chloride (KLOR-CON M) extended release tablet 40 mEq  40 mEq Oral PRN Rubi Spears MD   40 mEq at 02/25/22 1930    Or    potassium bicarb-citric acid (EFFER-K) effervescent tablet 40 mEq  40 mEq Oral PRN Rubi Spears MD        Or    potassium chloride 10 mEq/100 mL IVPB (Peripheral Line)  10 mEq IntraVENous PRN Rubi Spears MD        morphine (PF) injection 1 mg  1 mg IntraVENous Q4H PRN Rubi Spears MD   1 mg at 02/26/22 0854    pantoprazole (PROTONIX) injection 40 mg  40 mg IntraVENous Daily Rubi Spears MD   40 mg at 02/26/22 0855    And    sodium chloride (PF) 0.9 % injection 10 mL  10 mL IntraVENous Daily Rubi Spears MD   10 mL at 02/26/22 0900    promethazine (PHENERGAN) tablet 12.5 mg  12.5 mg Oral Q6H PRN Rubi Spears MD   12.5 mg at 02/20/22 2305    Or    ondansetron (ZOFRAN) injection 4 mg  4 mg IntraVENous Q6H PRN Rubi Spears MD   4 mg at 02/21/22 2225       Allergies:  Ceftriaxone    Social History:   reports that she has been smoking cigarettes. She has been smoking about 1.00 pack per day. She uses smokeless tobacco. She reports previous alcohol use. She reports that she does not use drugs.      Family History: family history includes Alcohol Abuse in her mother; Cirrhosis in her mother. REVIEW OF SYSTEMS:      Patient not able to give any meaningful history due to altered mental status    PHYSICAL EXAM:        /62   Pulse 67   Temp 98.4 °F (36.9 °C) (Oral)   Resp 16   Ht 5' 5\" (1.651 m)   Wt 163 lb 12.8 oz (74.3 kg)   SpO2 97%   BMI 27.26 kg/m²    Temp (24hrs), Av.6 °F (37 °C), Min:98.1 °F (36.7 °C), Max:99.4 °F (37.4 °C)      General appearance -sick appearing, no in pain or distress   Mental status -confused  Eyes - pupils equal and reactive, extraocular eye movements intact   Ears - bilateral TM's and external ear canals normal   Mouth - mucous membranes moist, pharynx normal without lesions. NG tube in place  Neck - supple, no significant adenopathy   Lymphatics - no palpable lymphadenopathy, no hepatosplenomegaly   Chest - clear to auscultation, no wheezes, rales or rhonchi, symmetric air entry   Heart - normal rate, regular rhythm, normal S1, S2, no murmurs  Abdomen - soft, nontender, nondistended, no masses or organomegaly   Neurological -confused and not following commands  Musculoskeletal - no joint tenderness, deformity or swelling   Extremities - peripheral pulses normal, no pedal edema, no clubbing or cyanosis   Skin - normal coloration and turgor, no rashes, no suspicious skin lesions noted ,      DATA:      Labs:     Results for orders placed or performed during the hospital encounter of 22   Culture, Urine    Specimen: Urine, clean catch   Result Value Ref Range    Specimen Description . CLEAN CATCH URINE     Culture ESCHERICHIA COLI 10 to 50,000 CFU/ML        Susceptibility    Escherichia coli - BACTERIAL SUSCEPTIBILITY PANEL TAQUERIA     ampicillin <=2 Sensitive      aztreonam <=1 Sensitive      ceFAZolin* <=4 Sensitive       * Cefazolin sensitivity results can be used to predict the effectiveness of oral cephalosporins (eg.  Cephalexin) in uncomplicated Urinary Tract Infections due to E. coli, K. pneumoniae, and P. mirabilis     cefTRIAXone <=1 Sensitive      ciprofloxacin <=0.25 Sensitive      Confirmatory Extended Spectrum Beta-Lactamase NEGATIVE Negative      gentamicin <=1 Sensitive      nitrofurantoin <=16 Sensitive      tobramycin <=1 Sensitive      trimethoprim-sulfamethoxazole <=20 Sensitive      piperacillin-tazobactam <=4 Sensitive    Culture, Blood 2    Specimen: Blood   Result Value Ref Range    Specimen Description . BLOOD     Special Requests L HAND 2ML     Culture NO GROWTH 5 DAYS    Culture, Blood 1    Specimen: Blood   Result Value Ref Range    Specimen Description . BLOOD     Special Requests R HAND 20ML     Culture NO GROWTH 5 DAYS    CBC with Auto Differential   Result Value Ref Range    WBC 20.0 (H) 3.5 - 11.3 k/uL    RBC 4.08 3.95 - 5.11 m/uL    Hemoglobin 11.9 11.9 - 15.1 g/dL    Hematocrit 39.5 36.3 - 47.1 %    MCV 96.8 82.6 - 102.9 fL    MCH 29.2 25.2 - 33.5 pg    MCHC 30.1 28.4 - 34.8 g/dL    RDW 17.7 (H) 11.8 - 14.4 %    Platelets 659 (H) 452 - 453 k/uL    MPV 9.3 8.1 - 13.5 fL    NRBC Automated 0.0 0.0 per 100 WBC    Differential Type NOT REPORTED     WBC Morphology NOT REPORTED     RBC Morphology NOT REPORTED     Platelet Estimate NOT REPORTED     Immature Granulocytes 0 0 %    Seg Neutrophils 88 (H) 36 - 66 %    Lymphocytes 1 (L) 24 - 44 %    Monocytes 11 (H) 1 - 7 %    Eosinophils % 0 (L) 1 - 4 %    Basophils 0 0 - 2 %    Absolute Immature Granulocyte 0.00 0.00 - 0.30 k/uL    Segs Absolute 17.60 (H) 1.8 - 7.7 k/uL    Absolute Lymph # 0.20 (L) 1.0 - 4.8 k/uL    Absolute Mono # 2.20 (H) 0.1 - 0.8 k/uL    Absolute Eos # 0.00 0.0 - 0.4 k/uL    Basophils Absolute 0.00 0.0 - 0.2 k/uL    Morphology ANISOCYTOSIS PRESENT    Comprehensive Metabolic Panel w/ Reflex to MG   Result Value Ref Range    Glucose 150 (H) 70 - 99 mg/dL    BUN 13 6 - 20 mg/dL    CREATININE 0.47 (L) 0.50 - 0.90 mg/dL    Bun/Cre Ratio NOT REPORTED 9 - 20    Calcium 9.0 8.6 - 10.4 mg/dL    Sodium 142 135 - 144 mmol/L Potassium 3.4 (L) 3.7 - 5.3 mmol/L    Chloride 103 98 - 107 mmol/L    CO2 22 20 - 31 mmol/L    Anion Gap 17 9 - 17 mmol/L    Alkaline Phosphatase 65 35 - 104 U/L    ALT 7 5 - 33 U/L    AST 12 <32 U/L    Total Bilirubin 0.16 (L) 0.3 - 1.2 mg/dL    Total Protein 6.7 6.4 - 8.3 g/dL    Albumin 4.2 3.5 - 5.2 g/dL    Albumin/Globulin Ratio 1.7 1.0 - 2.5    GFR Non-African American >60 >60 mL/min    GFR African American >60 >60 mL/min    GFR Comment          GFR Staging NOT REPORTED    Lipase   Result Value Ref Range    Lipase 13 13 - 60 U/L   Troponin   Result Value Ref Range    Troponin, High Sensitivity 51 (H) 0 - 14 ng/L    Troponin T NOT REPORTED <0.03 ng/mL    Troponin Interp NOT REPORTED    Urinalysis with Microscopic   Result Value Ref Range    Color, UA Yellow Yellow    Turbidity UA Turbid (A) Clear    Glucose, Ur 2+ (A) NEGATIVE    Bilirubin Urine NEGATIVE NEGATIVE    Ketones, Urine NEGATIVE NEGATIVE    Specific Gravity, UA 1.017 1.005 - 1.030    Urine Hgb NEGATIVE NEGATIVE    pH, UA 5.5 5.0 - 8.0    Protein, UA NEGATIVE NEGATIVE    Urobilinogen, Urine Normal Normal    Nitrite, Urine NEGATIVE NEGATIVE    Leukocyte Esterase, Urine NEGATIVE NEGATIVE    -          WBC, UA None 0 - 5 /HPF    RBC, UA 0 TO 2 0 - 4 /HPF    Casts UA NOT REPORTED 0 - 8 /LPF    Crystals, UA NOT REPORTED None /HPF    Epithelial Cells UA 0 TO 2 0 - 5 /HPF    Renal Epithelial, UA NOT REPORTED 0 /HPF    Bacteria, UA NOT REPORTED None    Mucus, UA NOT REPORTED None    Trichomonas, UA NOT REPORTED None    Amorphous, UA NOT REPORTED None    Other Observations UA NOT REPORTED NOT REQ.     Yeast, UA NOT REPORTED None   Lactate, Sepsis   Result Value Ref Range    Lactic Acid, Sepsis NOT REPORTED 0.5 - 1.9 mmol/L    Lactic Acid, Sepsis, Whole Blood 2.8 (H) 0.5 - 1.9 mmol/L   Lactate, Sepsis   Result Value Ref Range    Lactic Acid, Sepsis NOT REPORTED 0.5 - 1.9 mmol/L    Lactic Acid, Sepsis, Whole Blood 1.5 0.5 - 1.9 mmol/L   Lamotrigine Level Result Value Ref Range    Lamotrigine Lvl 1.3 (L) 3.0 - 15.0 ug/mL   Levetiracetam Level   Result Value Ref Range    Levetiracetam Lvl 22 ug/mL   Troponin   Result Value Ref Range    Troponin, High Sensitivity 57 (HH) 0 - 14 ng/L    Troponin T NOT REPORTED <0.03 ng/mL    Troponin Interp NOT REPORTED    Magnesium   Result Value Ref Range    Magnesium 1.8 1.6 - 2.6 mg/dL   Brain Natriuretic Peptide   Result Value Ref Range    Pro- (H) <300 pg/mL    BNP Interpretation NOT REPORTED    Basic Metabolic Panel w/ Reflex to MG   Result Value Ref Range    Glucose 109 (H) 70 - 99 mg/dL    BUN 8 6 - 20 mg/dL    CREATININE 0.39 (L) 0.50 - 0.90 mg/dL    Bun/Cre Ratio NOT REPORTED 9 - 20    Calcium 8.9 8.6 - 10.4 mg/dL    Sodium 137 135 - 144 mmol/L    Potassium 3.6 (L) 3.7 - 5.3 mmol/L    Chloride 103 98 - 107 mmol/L    CO2 22 20 - 31 mmol/L    Anion Gap 12 9 - 17 mmol/L    GFR Non-African American >60 >60 mL/min    GFR African American >60 >60 mL/min    GFR Comment          GFR Staging NOT REPORTED    CBC with Auto Differential   Result Value Ref Range    WBC 14.1 (H) 3.5 - 11.3 k/uL    RBC 3.56 (L) 3.95 - 5.11 m/uL    Hemoglobin 10.5 (L) 11.9 - 15.1 g/dL    Hematocrit 33.7 (L) 36.3 - 47.1 %    MCV 94.7 82.6 - 102.9 fL    MCH 29.5 25.2 - 33.5 pg    MCHC 31.2 28.4 - 34.8 g/dL    RDW 17.4 (H) 11.8 - 14.4 %    Platelets 149 (H) 662 - 453 k/uL    MPV 9.4 8.1 - 13.5 fL    NRBC Automated 0.0 0.0 per 100 WBC    Differential Type NOT REPORTED     WBC Morphology NOT REPORTED     RBC Morphology ANISOCYTOSIS PRESENT     Platelet Estimate NOT REPORTED     Seg Neutrophils 91 (H) 36 - 65 %    Lymphocytes 6 (L) 24 - 43 %    Monocytes 3 3 - 12 %    Eosinophils % 0 (L) 1 - 4 %    Basophils 0 0 - 2 %    Immature Granulocytes 1 (H) 0 %    Segs Absolute 12.77 (H) 1.50 - 8.10 k/uL    Absolute Lymph # 0.78 (L) 1.10 - 3.70 k/uL    Absolute Mono # 0.38 0.10 - 1.20 k/uL    Absolute Eos # <0.03 0.00 - 0.44 k/uL    Basophils Absolute 0.05 0.00 - Glucose 103 (H) 70 - 99 mg/dL    BUN 8 6 - 20 mg/dL    CREATININE 0.35 (L) 0.50 - 0.90 mg/dL    Calcium 9.1 8.6 - 10.4 mg/dL    Sodium 137 135 - 144 mmol/L    Potassium 3.6 (L) 3.7 - 5.3 mmol/L    Chloride 100 98 - 107 mmol/L    CO2 23 20 - 31 mmol/L    Anion Gap 14 9 - 17 mmol/L    GFR Non-African American >60 >60 mL/min    GFR African American >60 >60 mL/min    GFR Comment         CBC with Auto Differential   Result Value Ref Range    WBC 14.2 (H) 3.5 - 11.3 k/uL    RBC 3.88 (L) 3.95 - 5.11 m/uL    Hemoglobin 11.2 (L) 11.9 - 15.1 g/dL    Hematocrit 38.7 36.3 - 47.1 %    MCV 99.7 82.6 - 102.9 fL    MCH 28.9 25.2 - 33.5 pg    MCHC 28.9 28.4 - 34.8 g/dL    RDW 17.3 (H) 11.8 - 14.4 %    Platelets 101 (H) 628 - 453 k/uL    MPV 9.6 8.1 - 13.5 fL    NRBC Automated 0.0 0.0 per 100 WBC    RBC Morphology ANISOCYTOSIS PRESENT     Seg Neutrophils 91 (H) 36 - 65 %    Lymphocytes 7 (L) 24 - 43 %    Monocytes 1 (L) 3 - 12 %    Eosinophils % 0 (L) 1 - 4 %    Basophils 1 0 - 2 %    Immature Granulocytes 1 (H) 0 %    Segs Absolute 13.01 (H) 1.50 - 8.10 k/uL    Absolute Lymph # 0.92 (L) 1.10 - 3.70 k/uL    Absolute Mono # 0.15 0.10 - 1.20 k/uL    Absolute Eos # <0.03 0.00 - 0.44 k/uL    Basophils Absolute 0.07 0.00 - 0.20 k/uL    Absolute Immature Granulocyte 0.07 0.00 - 0.30 k/uL   Ferritin   Result Value Ref Range    Ferritin 202 (H) 13 - 150 ug/L   Iron and TIBC   Result Value Ref Range    Iron 46 37 - 145 ug/dL    TIBC 256 250 - 450 ug/dL    Iron Saturation 18 (L) 20 - 55 %    UIBC 210 112 - 347 ug/dL   Vitamin B12 & Folate   Result Value Ref Range    Vitamin B-12 313 232 - 1245 pg/mL    Folate 14.4 >4.8 ng/mL   Magnesium   Result Value Ref Range    Magnesium 1.7 1.6 - 2.6 mg/dL   Basic Metabolic Panel w/ Reflex to MG   Result Value Ref Range    Glucose 89 70 - 99 mg/dL    BUN 7 6 - 20 mg/dL    CREATININE 0.33 (L) 0.50 - 0.90 mg/dL    Calcium 8.5 (L) 8.6 - 10.4 mg/dL    Sodium 138 135 - 144 mmol/L    Potassium 3.4 (L) 3.7 - 5.3 mmol/L    Chloride 103 98 - 107 mmol/L    CO2 21 20 - 31 mmol/L    Anion Gap 14 9 - 17 mmol/L    GFR Non-African American >60 >60 mL/min    GFR African American >60 >60 mL/min    GFR Comment         CBC with Auto Differential   Result Value Ref Range    WBC 8.8 3.5 - 11.3 k/uL    RBC 3.54 (L) 3.95 - 5.11 m/uL    Hemoglobin 10.1 (L) 11.9 - 15.1 g/dL    Hematocrit 34.2 (L) 36.3 - 47.1 %    MCV 96.6 82.6 - 102.9 fL    MCH 28.5 25.2 - 33.5 pg    MCHC 29.5 28.4 - 34.8 g/dL    RDW 17.0 (H) 11.8 - 14.4 %    Platelets 608 (H) 277 - 453 k/uL    MPV 9.6 8.1 - 13.5 fL    NRBC Automated 0.0 0.0 per 100 WBC    RBC Morphology ANISOCYTOSIS PRESENT     Seg Neutrophils 85 (H) 36 - 65 %    Lymphocytes 12 (L) 24 - 43 %    Monocytes 2 (L) 3 - 12 %    Eosinophils % 0 (L) 1 - 4 %    Basophils 1 0 - 2 %    Immature Granulocytes 0 0 %    Segs Absolute 7.47 1.50 - 8.10 k/uL    Absolute Lymph # 1.04 (L) 1.10 - 3.70 k/uL    Absolute Mono # 0.16 0.10 - 1.20 k/uL    Absolute Eos # <0.03 0.00 - 0.44 k/uL    Basophils Absolute 0.08 0.00 - 0.20 k/uL    Absolute Immature Granulocyte 0.03 0.00 - 0.30 k/uL   Magnesium   Result Value Ref Range    Magnesium 1.9 1.6 - 2.6 mg/dL   Potassium   Result Value Ref Range    Potassium 3.9 3.7 - 5.3 mmol/L   Magnesium   Result Value Ref Range    Magnesium 1.9 1.6 - 2.6 mg/dL   Lamotrigine Level   Result Value Ref Range    Lamotrigine Lvl 1.5 (L) 3.0 - 15.0 ug/mL   Basic Metabolic Panel w/ Reflex to MG   Result Value Ref Range    Glucose 69 (L) 70 - 99 mg/dL    BUN 7 6 - 20 mg/dL    CREATININE 0.35 (L) 0.50 - 0.90 mg/dL    Calcium 9.0 8.6 - 10.4 mg/dL    Sodium 138 135 - 144 mmol/L    Potassium 3.7 3.7 - 5.3 mmol/L    Chloride 101 98 - 107 mmol/L    CO2 22 20 - 31 mmol/L    Anion Gap 15 9 - 17 mmol/L    GFR Non-African American >60 >60 mL/min    GFR African American >60 >60 mL/min    GFR Comment         CBC with Auto Differential   Result Value Ref Range    WBC 6.7 3.5 - 11.3 k/uL    RBC 3.83 (L) 3.95 - 5.11 m/uL Hemoglobin 11.1 (L) 11.9 - 15.1 g/dL    Hematocrit 36.8 36.3 - 47.1 %    MCV 96.1 82.6 - 102.9 fL    MCH 29.0 25.2 - 33.5 pg    MCHC 30.2 28.4 - 34.8 g/dL    RDW 16.7 (H) 11.8 - 14.4 %    Platelets 238 (H) 221 - 453 k/uL    MPV 9.7 8.1 - 13.5 fL    NRBC Automated 0.3 (H) 0.0 per 100 WBC    Seg Neutrophils 74 (H) 36 - 65 %    Lymphocytes 19 (L) 24 - 43 %    Monocytes 5 3 - 12 %    Eosinophils % 1 1 - 4 %    Basophils 1 0 - 2 %    Immature Granulocytes 0 0 %    Segs Absolute 4.98 1.50 - 8.10 k/uL    Absolute Lymph # 1.29 1.10 - 3.70 k/uL    Absolute Mono # 0.32 0.10 - 1.20 k/uL    Absolute Eos # 0.04 0.00 - 0.44 k/uL    Basophils Absolute 0.08 0.00 - 0.20 k/uL    Absolute Immature Granulocyte <0.03 0.00 - 0.30 k/uL    RBC Morphology ANISOCYTOSIS PRESENT    Basic Metabolic Panel w/ Reflex to MG   Result Value Ref Range    Glucose 79 70 - 99 mg/dL    BUN 8 6 - 20 mg/dL    CREATININE 0.46 (L) 0.50 - 0.90 mg/dL    Calcium 8.9 8.6 - 10.4 mg/dL    Sodium 139 135 - 144 mmol/L    Potassium 3.4 (L) 3.7 - 5.3 mmol/L    Chloride 104 98 - 107 mmol/L    CO2 21 20 - 31 mmol/L    Anion Gap 14 9 - 17 mmol/L    GFR Non-African American >60 >60 mL/min    GFR African American >60 >60 mL/min    GFR Comment         CBC with Auto Differential   Result Value Ref Range    WBC 3.5 3.5 - 11.3 k/uL    RBC 3.99 3.95 - 5.11 m/uL    Hemoglobin 11.2 (L) 11.9 - 15.1 g/dL    Hematocrit 37.7 36.3 - 47.1 %    MCV 94.5 82.6 - 102.9 fL    MCH 28.1 25.2 - 33.5 pg    MCHC 29.7 28.4 - 34.8 g/dL    RDW 16.5 (H) 11.8 - 14.4 %    Platelets 025 231 - 862 k/uL    MPV 9.7 8.1 - 13.5 fL    NRBC Automated 0.0 0.0 per 100 WBC    RBC Morphology ANISOCYTOSIS PRESENT     Seg Neutrophils 52 36 - 65 %    Lymphocytes 29 24 - 43 %    Monocytes 14 (H) 3 - 12 %    Eosinophils % 1 1 - 4 %    Basophils 2 0 - 2 %    Immature Granulocytes 1 (H) 0 %    Segs Absolute 1.85 1.50 - 8.10 k/uL    Absolute Lymph # 1.02 (L) 1.10 - 3.70 k/uL    Absolute Mono # 0.51 0.10 - 1.20 k/uL Absolute Eos # 0.05 0.00 - 0.44 k/uL    Basophils Absolute 0.08 0.00 - 0.20 k/uL    Absolute Immature Granulocyte <0.03 0.00 - 0.30 k/uL   Magnesium   Result Value Ref Range    Magnesium 1.8 1.6 - 2.6 mg/dL   Levetiracetam Level   Result Value Ref Range    Levetiracetam Lvl 15 ug/mL   Basic Metabolic Panel w/ Reflex to MG   Result Value Ref Range    Glucose 83 70 - 99 mg/dL    BUN 7 6 - 20 mg/dL    CREATININE 0.39 (L) 0.50 - 0.90 mg/dL    Calcium 8.9 8.6 - 10.4 mg/dL    Sodium 142 135 - 144 mmol/L    Potassium 4.2 3.7 - 5.3 mmol/L    Chloride 107 98 - 107 mmol/L    CO2 25 20 - 31 mmol/L    Anion Gap 10 9 - 17 mmol/L    GFR Non-African American >60 >60 mL/min    GFR African American >60 >60 mL/min    GFR Comment         CBC with Auto Differential   Result Value Ref Range    WBC 5.0 3.5 - 11.3 k/uL    RBC 3.72 (L) 3.95 - 5.11 m/uL    Hemoglobin 10.9 (L) 11.9 - 15.1 g/dL    Hematocrit 35.7 (L) 36.3 - 47.1 %    MCV 96.0 82.6 - 102.9 fL    MCH 29.3 25.2 - 33.5 pg    MCHC 30.5 28.4 - 34.8 g/dL    RDW 17.3 (H) 11.8 - 14.4 %    Platelets 054 824 - 955 k/uL    MPV 9.7 8.1 - 13.5 fL    NRBC Automated 0.0 0.0 per 100 WBC    Seg Neutrophils 64 36 - 65 %    Lymphocytes 20 (L) 24 - 43 %    Monocytes 10 3 - 12 %    Eosinophils % 3 1 - 4 %    Basophils 2 0 - 2 %    Immature Granulocytes 1 (H) 0 %    Segs Absolute 3.25 1.50 - 8.10 k/uL    Absolute Lymph # 1.02 (L) 1.10 - 3.70 k/uL    Absolute Mono # 0.48 0.10 - 1.20 k/uL    Absolute Eos # 0.13 0.00 - 0.44 k/uL    Basophils Absolute 0.08 0.00 - 0.20 k/uL    Absolute Immature Granulocyte 0.03 0.00 - 0.30 k/uL    RBC Morphology ANISOCYTOSIS PRESENT    POC Glucose Fingerstick   Result Value Ref Range    POC Glucose 173 (H) 65 - 105 mg/dL   POC Glucose Fingerstick   Result Value Ref Range    POC Glucose 79 65 - 105 mg/dL   POC Glucose Fingerstick   Result Value Ref Range    POC Glucose 71 65 - 105 mg/dL   POC Glucose Fingerstick   Result Value Ref Range    POC Glucose 74 65 - 105 mg/dL POC Glucose Fingerstick   Result Value Ref Range    POC Glucose 96 65 - 105 mg/dL   POC Glucose Fingerstick   Result Value Ref Range    POC Glucose 76 65 - 105 mg/dL   POC Glucose Fingerstick   Result Value Ref Range    POC Glucose 132 (H) 65 - 105 mg/dL   POC Glucose Fingerstick   Result Value Ref Range    POC Glucose 121 (H) 65 - 105 mg/dL   POC Glucose Fingerstick   Result Value Ref Range    POC Glucose 92 65 - 105 mg/dL   EKG 12 Lead   Result Value Ref Range    Ventricular Rate 90 BPM    Atrial Rate 90 BPM    P-R Interval 162 ms    QRS Duration 90 ms    Q-T Interval 386 ms    QTc Calculation (Bazett) 472 ms    P Axis 49 degrees    R Axis 87 degrees    T Axis 55 degrees   EKG 12 Lead   Result Value Ref Range    Ventricular Rate 99 BPM    Atrial Rate 99 BPM    P-R Interval 172 ms    QRS Duration 92 ms    Q-T Interval 364 ms    QTc Calculation (Bazett) 467 ms    P Axis 45 degrees    R Axis -5 degrees    T Axis 36 degrees   EKG 12 Lead   Result Value Ref Range    Ventricular Rate 64 BPM    Atrial Rate 64 BPM    P-R Interval 152 ms    QRS Duration 88 ms    Q-T Interval 454 ms    QTc Calculation (Bazett) 468 ms    P Axis 42 degrees    R Axis 28 degrees    T Axis 46 degrees         IMAGING DATA:    XR ABDOMEN (KUB) (SINGLE AP VIEW)    Result Date: 2/20/2022  EXAMINATION: ONE SUPINE XRAY VIEW(S) OF THE ABDOMEN 2/20/2022 11:18 pm COMPARISON: January 4, 2021 HISTORY: ORDERING SYSTEM PROVIDED HISTORY: rule out ileus TECHNOLOGIST PROVIDED HISTORY: rule out ileus Reason for Exam: supine,nausea,vomiting FINDINGS: 37 mm calcified lesion right upper quadrant may represent a gallstone. New metallic coils noted in the left upper quadrant. A catheter is noted extending from the left upper quadrant to the pelvis and appears unchanged. Gas-filled loops of small large bowel. Increased stool. Osseous structures normal.  Intravenous contrast is noted in the bladder. Ileus. Probable gallstone.   Catheter left abdomen and pelvis requires clinical correlation. CT Head WO Contrast    Result Date: 2/20/2022  EXAMINATION: CT OF THE HEAD WITHOUT CONTRAST  2/20/2022 1:44 pm TECHNIQUE: CT of the head was performed without the administration of intravenous contrast. Dose modulation, iterative reconstruction, and/or weight based adjustment of the mA/kV was utilized to reduce the radiation dose to as low as reasonably achievable. COMPARISON: 06/18/2021, MRI 06/18/2021 HISTORY: ORDERING SYSTEM PROVIDED HISTORY:  Eavl for mets TECHNOLOGIST PROVIDED HISTORY: Eavl for mets Decision Support Exception - unselect if not a suspected or confirmed emergency medical condition->Emergency Medical Condition (MA) Reason for Exam:  Eval for mets FINDINGS: BRAIN/VENTRICLES: There is no acute intracranial hemorrhage, mass effect or midline shift. No abnormal extra-axial fluid collection. The gray-white differentiation is maintained without evidence of an acute infarct. There is no evidence of hydrocephalus. Subtle low attenuation in the deep white matter which is nonspecific likely due to chronic small vessel ischemia. Overall appearance is similar to the prior study. CT with contrast or MRI would be more sensitive to evaluate for metastatic disease. ORBITS: The visualized portion of the orbits demonstrate no acute abnormality. SINUSES: The visualized paranasal sinuses demonstrate no acute abnormality. Tiny retention cyst versus focal mucosal thickening left maxillary sinus. Minimal fluid/opacification left mastoid air cells. SOFT TISSUES/SKULL:  No acute abnormality of the visualized skull or soft tissues. Similar small right temporal flat skin lesion. No acute intracranial abnormality.      XR CHEST PORTABLE    Result Date: 2/20/2022  EXAMINATION: ONE XRAY VIEW OF THE CHEST 2/20/2022 11:51 am COMPARISON: 06/22/2021, 06/21/2021 HISTORY: ORDERING SYSTEM PROVIDED HISTORY: eval for pmn TECHNOLOGIST PROVIDED HISTORY: eval for pmn FINDINGS: The cardiomediastinal silhouette is unchanged in appearance. Right internal jugular port in place. Generalized interstitial prominence again noted. There is no consolidation, pneumothorax, or evidence of edema. No effusion is appreciated. The osseous structures are unchanged in appearance. Vascular coil pack in the left upper quadrant. Chronic findings in the chest without acute airspace disease identified. CT CHEST PULMONARY EMBOLISM W CONTRAST    Result Date: 2/20/2022  EXAMINATION: CTA OF THE CHEST 2/20/2022 1:48 pm TECHNIQUE: CTA of the chest was performed after the administration of intravenous contrast.  Multiplanar reformatted images are provided for review. MIP images are provided for review. Dose modulation, iterative reconstruction, and/or weight based adjustment of the mA/kV was utilized to reduce the radiation dose to as low as reasonably achievable. COMPARISON: CT chest 03/09/2021. CT abdomen and pelvis 10/29/2021. HISTORY: ORDERING SYSTEM PROVIDED HISTORY: eval for pe TECHNOLOGIST PROVIDED HISTORY: eval for pe Decision Support Exception - unselect if not a suspected or confirmed emergency medical condition->Emergency Medical Condition (MA) Reason for Exam: eval for pe FINDINGS: Pulmonary Arteries: Pulmonary arteries are adequately opacified for evaluation. No evidence of intraluminal filling defect to suggest pulmonary embolism. Main pulmonary artery is normal in caliber. Mediastinum: Mildly enlarged confluent hilar lymph nodes and lymphatic tissue in the AP window and subcarinal regions again demonstrated. Small pericardial effusion, similar in appearance. There is no acute abnormality of the thoracic aorta. Right internal jugular port in place. Lungs/pleura: Respiratory motion artifact noted. Mild septal thickening. Persistent nodular opacity in the left lower lobe on axial image 63 measuring up to 2.6 cm. Scattered ground-glass nodules are again demonstrated bilaterally.   The amount of left pleural fluid has diminished in the interval with trace amount remaining. Soft tissue calcification near the supra [place]der IVC again demonstrated. Upper Abdomen: Coarse calcification near the medial and inferior aspect of the spleen partially visualized, as seen previously. Dense metallic artifact corresponding to metallic coil pack near the spleen again demonstrated. Cholelithiasis. Soft Tissues/Bones: Blastic metastatic deposits predominantly in the lower thoracic and upper lumbar spine again demonstrated. 1.  No evidence for acute pulmonary embolism. 2.  Bilateral pulmonary nodules, thoracic lymphadenopathy, soft tissue calcifications in the chest and upper abdomen and sclerotic bone lesions again demonstrated, as described previously, which may be related to the patient's history of malignancy. 3.  Small pericardial effusion. Trace left pleural effusion. 4.  Mild septal thickening suggestive of interstitial edema. IR PLACE NG TUBE BY DR Herminia Schneider    Result Date: 2/21/2022  PROCEDURE: XR PLACE NASOGASTRIC TUBE PHYS 2/21/2022 HISTORY: ORDERING SYSTEM PROVIDED HISTORY: ileus TECHNOLOGIST PROVIDED HISTORY: ileus Ileus CONTRAST: None SEDATION: None FLUOROSCOPY DOSE AND TYPE OR TIME AND EXPOSURES: Fluoro time 0.7 minutes  cGy cm 2 DESCRIPTION OF PROCEDURE: This procedure was performed by Giovanny DAVID under indirect supervision of . [place] protocol was observed. An attempt was made to pass a 12 [place] sump through right and left nostrils without success due to stricture in the nasal cavity. Under fluoroscopic guidance, through the left nostril, a 12 [place] sump nasogastric tube was negotiated into the stomach. With catheter manipulation, the tip of the catheter was manipulated into the distal stomach/proximal duodenum. Air bolus administration confirmed satisfactory tip position. The catheter was secured appropriately at 74 cm. Estimated blood loss was 0 mL.  The patient tolerated the procedure well. Successful placement of nasogastric tube. Okay to use. IMPRESSION:   Primary Problem  Dehydration    Active Hospital Problems    Diagnosis Date Noted    Acute metabolic encephalopathy [E96.49]     Confusion [R41.0]     Urinary tract infection without hematuria [N39.0]     Seizure disorder ([address] [G40.909]     Breakthrough seizure ([address] [G40.919]     Lactic acidosis [E87.2] 02/21/2022    Ileus ([address] [K56.7] 02/21/2022    Elevated troponin [R77.8] 02/21/2022    Pericardial effusion [I31.3] 02/21/2022    Hypokalemia [E87.6] 02/21/2022    Acute respiratory failure with hypoxia (HCC) [J96.01] 02/21/2022    Dehydration [E86.0] 02/20/2022    AMS (altered mental status) [R41.82] 02/20/2022    Ovarian cancer ([address] [C56.9] 05/29/2021    Chronic deep vein thrombosis (DVT) of femoral vein of left lower extremity ([address] [I82.512] 03/11/2021    Fatigue [R53.83]     Cancer, metastatic to bone ([address] [C79.51] 01/28/2021    Encephalopathy acute [G93.40]     Type 2 diabetes mellitus without complication, without long-term current use of insulin ([address] [E11.9]     Seizure ([address] [R56.9] 10/21/2020       Recurrent ovarian cancer chemotherapy using gemcitabine with stable CA-125  Abdominal pain nausea vomiting  Ileus per KUB  Seizure disorder  DVT of femoral vein on anticoagulation using Eliquis      RECOMMENDATIONS:  1. I personally reviewed results of lab work-up imaging studies and other relevant clinical data. 2. Mentation much improved  3. Discussed results of CT scan which show slight increase in size of lymph node concerning for progression. This will be further addressed by patient's primary oncologist.  Reviewed goals and expectations. Patient wishes to pursue further treatment  4. Continue symptomatic supportive care  5. No plans for chemotherapy in house  6. We will follow. Discussed with patient and Nurse.       Thank you for asking us to see this patient. Link Newberry MD          This note is created with the assistance of a speech recognition program.  While intending to generate a document that actually reflects the content of the visit, the document can still have some errors including those of syntax and sound a like substitutions which may escape proof reading. It such instances, actual meaning can be extrapolated by contextual diversion.

## 2022-02-26 NOTE — PROGRESS NOTES
5-40 mL IntraVENous 2 times per day    pantoprazole  40 mg IntraVENous Daily    And    sodium chloride (PF)  10 mL IntraVENous Daily       Past Medical History:   Diagnosis Date    Anemia     Bleeding 10/2020    intra-abdominal bleeding -due to splenic mass with GI infiltration. Status post embolization    [facility]     Depression     Diabetes mellitus ([facility]     GERD (gastroesophageal reflux disease)     Hx of blood clots     Hypertension     Metastatic cancer ([facility] 10/2020    extensive intraabdominal and splenic involvement and lung mets.  Ovarian cancer ([facility]     low grade serous ovarian carcinoma    Post chemo evaluation     2007: Chemo via med onc (Dr. Corey Harrington), 2008: [place] [place] due to rising CA-125, 2013: intraperitoneal chemo,12/2015: Ca125 - 25     Splenic lesion        Past Surgical History:   Procedure Laterality Date    ABSCESS DRAINAGE  2013    Franca rectal    ANUS SURGERY      ANAL FISSURECTOMY    CARDIAC CATHETERIZATION      COLECTOMY  03/2013    ex lap, tumor debulking, transverse colectomy w reanastamosis, subgastric omentectomy, intraperitoneal port placement    HYSTERECTOMY, TOTAL ABDOMINAL      IR EMBOLIZATION HEMORRHAGE  10/05/2020    intra-abdominal bleeding -due to splenic mass with GI infiltration. Status post embolization [employer] interlock coils x7. mri condtional 3t ok, safe immediately post implant.  IR PORT PLACEMENT EQUAL OR GREATER THAN 5 YEARS  08/24/2020    IR PORT PLACEMENT EQUAL OR GREATER THAN 5 YEARS 8/24/2020 Onel Crisostomo MD [facility]    PORT SURGERY      IP Port    TONSILLECTOMY         Objective:     PHYSICAL EXAM:      Blood pressure 136/62, pulse 67, temperature 98.4 °F (36.9 °C), temperature source Oral, resp. rate 16, weight 163 lb 12.8 oz (74.3 kg), SpO2 97 %. General Examination    General Resting comfortably in bed   Head Normocephalic, without obvious abnormality   Neck Supple, symmetrical. Good ROM.  No midline or paraspinal tenderness. Lungs Respirations unlabored, no wheezing   Chest Wall No deformity   Heart RRR, no murmur   Abdomen Soft. Non-tender, non-distended   Extremities No cyanosis or edema or warmth. Pulses 2+ and symmetric   Skin: Skin  turgor normal, no rashes or lesions         Mental status  Speech Alert. Oriented to person, place, and time. Speech is fluent without paraphasic errors  Good repetition and naming  Can do 1 step, 2 step, and cross-body commands  Can spell world backwards. Language appropriate. No hallucinations or delusions. No SI/HI. Cranial nerves   II - VFF, visual threat intact  III, IV, VI - extra-ocular muscles full. No nystagmus. Pupils symmetric and responsive.    V - sensation symmetric         VII -  No facial droop or asymmetric NLF  VIII - intact hearing to conversational tone          IX, X - symmetrical palate elevation   XI - 5/5 strength symmetric  XII - tongue midline   Motor function  Strength: grossly 5/5 in b/l              Deltoid, biceps, triceps, wrist flexion, wrist extension             Hip flexion/extension, knee flexion/extension, plantar flexion  Bulk: grossly normal no atrophy  Tone: symmetric b/l arms and legs  Abnormal movements: No abnormal movements or tremor   Sensory function Symmetric to touch in all extremities bilaterally   Cerebellar No dysmetria or dysdiadochocinesia    Reflex function DTR:        2+ b/l symmetric in biceps, brachioradialis, patellar, calcaneal  Babinski b/l plantar downgoing   Gait                  Not assessed       Investigations:      Laboratory Testing:  Recent Results (from the past 24 hour(s))   Levetiracetam Level    Collection Time: 02/25/22 10:58 AM   Result Value Ref Range    Levetiracetam Lvl 15 ug/mL   POC Glucose Fingerstick    Collection Time: 02/25/22 12:01 PM   Result Value Ref Range    POC Glucose 132 (H) 65 - 105 mg/dL   POC Glucose Fingerstick    Collection Time: 02/25/22  7:16 PM   Result Value Ref Range POC Glucose 121 (H) 65 - 105 mg/dL   POC Glucose Fingerstick    Collection Time: 02/26/22  6:22 AM   Result Value Ref Range    POC Glucose 92 65 - 105 mg/dL   Basic Metabolic Panel w/ Reflex to MG    Collection Time: 02/26/22  7:21 AM   Result Value Ref Range    Glucose 83 70 - 99 mg/dL    BUN 7 6 - 20 mg/dL    CREATININE 0.39 (L) 0.50 - 0.90 mg/dL    Calcium 8.9 8.6 - 10.4 mg/dL    Sodium 142 135 - 144 mmol/L    Potassium 4.2 3.7 - 5.3 mmol/L    Chloride 107 98 - 107 mmol/L    CO2 25 20 - 31 mmol/L    Anion Gap 10 9 - 17 mmol/L    GFR Non-African American >60 >60 mL/min    GFR African American >60 >60 mL/min    GFR Comment         CBC with Auto Differential    Collection Time: 02/26/22  7:21 AM   Result Value Ref Range    WBC 5.0 3.5 - 11.3 k/uL    RBC 3.72 (L) 3.95 - 5.11 m/uL    Hemoglobin 10.9 (L) 11.9 - 15.1 g/dL    Hematocrit 35.7 (L) 36.3 - 47.1 %    MCV 96.0 82.6 - 102.9 fL    MCH 29.3 25.2 - 33.5 pg    MCHC 30.5 28.4 - 34.8 g/dL    RDW 17.3 (H) 11.8 - 14.4 %    Platelets 723 239 - 479 k/uL    MPV 9.7 8.1 - 13.5 fL    NRBC Automated 0.0 0.0 per 100 WBC    Seg Neutrophils 64 36 - 65 %    Lymphocytes 20 (L) 24 - 43 %    Monocytes 10 3 - 12 %    Eosinophils % 3 1 - 4 %    Basophils 2 0 - 2 %    Immature Granulocytes 1 (H) 0 %    Segs Absolute 3.25 1.50 - 8.10 k/uL    Absolute Lymph # 1.02 (L) 1.10 - 3.70 k/uL    Absolute Mono # 0.48 0.10 - 1.20 k/uL    Absolute Eos # 0.13 0.00 - 0.44 k/uL    Basophils Absolute 0.08 0.00 - 0.20 k/uL    Absolute Immature Granulocyte 0.03 0.00 - 0.30 k/uL    RBC Morphology ANISOCYTOSIS PRESENT        Imaging/Diagnostics:  ECHO Complete 2D W Doppler W Color    Result Date: 2/22/2022  Transthoracic Echocardiography Report (TTE)  Patient Name Skip Dillno      Date of Study               02/22/2022               [facility]   Date of      1965  Gender                      Female  Birth   Age          60 year(s)  Race                           Room Number  4059 Height:                     66 inch, 167.64 cm   Corporate ID [id number]    Weight:                     150 pounds, 68 kg  #   Patient Acct [de-identified]   BSA:          1.77 m^2      BMI:       24.21  #                                                               kg/m^2   MR #         [id number]     Sonographer                 Leticia Yuen   Accession #  [id number]  Interpreting Physician      Wesley Kirk   Fellow                   Referring Nurse                           Practitioner   Interpreting             Referring Physician         Maria Luisa Monet MD  Fellow  Type of Study   TTE procedure:2D Echocardiogram, Limited Echo. Procedure Date Date: 02/22/2022 Start: 12:11 PM Study Location: [facility] Technical Quality: Fair visualization Indications:Pericardial effusion, Altered mental status and Elevated troponin. History / Tech. Comments: Procedure explained to patient. Limited echo to re-assess pericardial effusion. Patient Status: Inpatient Height: 66 inches Weight: 150 pounds BSA: 1.77 m^2 BMI: 24.21 kg/m^2 CONCLUSIONS Summary Limited study Global left ventricular systolic function is normal. Estimated ejection fraction is 55 % . Small to moderate pericardial effusion seen mostly posteriorly, somewhat improved from last study in 06/2021. No echo signs of tamponade. Signature ----------------------------------------------------------------------------  Electronically signed by Ju Brunson(Sonographer) on 02/22/2022  12:42 PM ---------------------------------------------------------------------------- ----------------------------------------------------------------------------  Electronically signed by Sunil KirkInterpreting physician) on  02/22/2022 01:03 PM ---------------------------------------------------------------------------- FINDINGS Left Ventricle Global left ventricular systolic function is normal. Estimated ejection fraction is 55 % .  Calculated EF via 3D Heart Model is 55 %. Right Ventricle Right ventricular function appears normal . Pericardial Effusion Small to moderate pericardial effusion seen mostly posteriorly. Miscellaneous IVC normal diameter & inspiratory collapse indicating normal RA filling pressure . XR ABDOMEN (KUB) (SINGLE AP VIEW)    Result Date: 2/24/2022  EXAMINATION: ONE SUPINE XRAY VIEW(S) OF THE ABDOMEN 2/24/2022 11:35 am COMPARISON: 02/22/2022 HISTORY: ORDERING SYSTEM PROVIDED HISTORY: F/U Ileus TECHNOLOGIST PROVIDED HISTORY: F/U Ileus Reason for Exam: port supine FINDINGS: The bowel gas pattern is unremarkable. There is no significant bowel distension. Contrast is noted in the colon. There is a pelvic catheter in place. Embolization coils are noted in the left upper quadrant. There is a peripherally calcified gallstone in the the right upper quadrant. No change from prior examination. Bowel gas pattern is unremarkable. Other incidental findings as described above. XR ABDOMEN (KUB) (SINGLE AP VIEW)    Result Date: 2/20/2022  EXAMINATION: ONE SUPINE XRAY VIEW(S) OF THE ABDOMEN 2/20/2022 11:18 pm COMPARISON: January 4, 2021 HISTORY: ORDERING SYSTEM PROVIDED HISTORY: rule out ileus TECHNOLOGIST PROVIDED HISTORY: rule out ileus Reason for Exam: supine,nausea,vomiting FINDINGS: 37 mm calcified lesion right upper quadrant may represent a gallstone. New metallic coils noted in the left upper quadrant. A catheter is noted extending from the left upper quadrant to the pelvis and appears unchanged. Gas-filled loops of small large bowel. Increased stool. Osseous structures normal.  Intravenous contrast is noted in the bladder. Ileus. Probable gallstone. Catheter left abdomen and pelvis requires clinical correlation.      CT Head WO Contrast    Result Date: 2/20/2022  EXAMINATION: CT OF THE HEAD WITHOUT CONTRAST  2/20/2022 1:44 pm TECHNIQUE: CT of the head was performed without the administration of intravenous contrast. Dose modulation, iterative reconstruction, and/or weight based adjustment of the mA/kV was utilized to reduce the radiation dose to as low as reasonably achievable. COMPARISON: 06/18/2021, MRI 06/18/2021 HISTORY: ORDERING SYSTEM PROVIDED HISTORY:  [place]avl for mets TECHNOLOGIST PROVIDED HISTORY: Eavl for mets Decision Support Exception - unselect if not a suspected or confirmed emergency medical condition->Emergency Medical Condition (MA) Reason for Exam:  Eval for mets FINDINGS: BRAIN/VENTRICLES: There is no acute intracranial hemorrhage, mass effect or midline shift. No abnormal extra-axial fluid collection. The gray-white differentiation is maintained without evidence of an acute infarct. There is no evidence of hydrocephalus. Subtle low attenuation in the deep white matter which is nonspecific likely due to chronic small vessel ischemia. Overall appearance is similar to the prior study. CT with contrast or MRI would be more sensitive to evaluate for metastatic disease. ORBITS: The visualized portion of the orbits demonstrate no acute abnormality. SINUSES: The visualized paranasal sinuses demonstrate no acute abnormality. Tiny retention cyst versus focal mucosal thickening left maxillary sinus. Minimal fluid/opacification left mastoid air cells. SOFT TISSUES/SKULL:  No acute abnormality of the visualized skull or soft tissues. Similar small right temporal flat skin lesion. No acute intracranial abnormality. CT ABDOMEN PELVIS W IV CONTRAST Additional Contrast? Oral    Result Date: 2/22/2022  EXAMINATION: CT OF THE ABDOMEN AND PELVIS WITH CONTRAST 2/22/2022 9:41 pm TECHNIQUE: CT of the abdomen and pelvis was performed with the administration of intravenous contrast. Multiplanar reformatted images are provided for review. Dose modulation, iterative reconstruction, and/or weight based adjustment of the mA/kV was utilized to reduce the radiation dose to as low as reasonably achievable.  COMPARISON: Abdomen and pelvis CTs dating to 03/09/2021, PET/CT 08/20/2020 HISTORY: ORDERING SYSTEM PROVIDED HISTORY: assess disease status TECHNOLOGIST PROVIDED HISTORY: Compare to previous scan and comment on disease status assess disease status Reason for Exam: assess disease status, Altered Mental Status; Nausea Vomiting FINDINGS: Lack of intra-abdominal fat, partially limiting evaluation of the peritoneal cavity. LOWER CHEST:  Increased size of a 3.3 cm x 1.9 cm noncalcified solid mass in the posterior basilar segment of the left lower lobe (2.6 cm x 1.5 cm on 10/29/2021). Increased size of additional noncalcified solid nodules scattered throughout both lung bases. Normal heart size. Persistent trace bilateral pleural effusions and mild pericardial effusion. No significant change in partially calcified right pleural lesions; for reference, 2.1 cm x 1.2 cm lesion near the right cardiophrenic angle (2.3 cm x 1.3 cm on 10/29/2021). No definite pericardial nodularity. Increased size of a 1.1 cm x 0.9 cm right retrocrural lymph node (0.9 cm x 0.7 cm 10/20/2021). ORGANS:  Cholelithiasis without findings of acute cholecystitis. No intrahepatic nor extrahepatic biliary dilation. Mild to moderate pancreatic atrophy. Mild splenic atrophy with abnormal contour due to scarring. Normal liver, adrenal glands, and kidneys GI/BOWEL:  Gastric tube tip at the gastroesophageal junction with the sideport not included but likely in the mid thoracic esophagus. Increased luminal narrowing of the mid sigmoid colon along the left pelvic sidewall. Otherwise normal course and caliber of the stomach, small bowel, colon, and rectum without obstruction. Normal appendix. PELVIS:  Surgically absent uterus and ovaries. Normal urinary bladder. Laxity of the pelvic floor musculature. PERITONEUM/RETROPERITONEUM:  Changes related to coil embolization of the splenic artery. Moderate systemic atherosclerosis.   No significant change in partially calcified retroperitoneal and bilateral pelvic lymph nodes; for reference, 3.1 cm x 2.3 cm right external iliac node (3.1 cm x 2.4 cm on 10/29/2021). No free intraperitoneal fluid nor gas. At most slight increase in size of partially calcified peritoneal lesions; for reference, 4.7 cm x 4.1 cm lesion in the central pelvis (4.2 cm x 4.0 cm on 10/29/2021). Unchanged peritoneal port catheter with the hub in the left upper quadrant and the tip along the right pelvic sidewall. SOFT TISSUES/BONES:  Closed midline laparotomy incision. Foci of gas in the subcutaneous tissues of the bilateral anterior abdominal wall likely related to medicinal injections. Increased size of a mildly enlarged 2.7 cm x 2.0 cm left inguinal lymph node with abnormal morphology (2.2 cm x 1.6 cm on 10/29/2021). No right inguinal lymphadenopathy. No significant change scattered blastic lesions in the axial skeleton. No acute fracture. 1. Disease progression with increased size of bilateral pulmonary metastases, slightly increased size of right retrocrural and left inguinal nannette metastases, and possible slight increase in size of peritoneal metastases. Abdominal and pelvic nannette metastases and skeletal metastases appear unchanged. 2. Persistent trace bilateral pleural effusions and small pericardial effusion, likely malignant in etiology. 3. Worsened stricturing of the mid sigmoid colon with no obstruction at this time. This may be a sequela of prior treatment to the area. 4. The gastric tube has been retracted with tip now at the gastroesophageal junction and the sideport not included. Recommend advancement     XR CHEST PORTABLE    Result Date: 2/20/2022  EXAMINATION: ONE XRAY VIEW OF THE CHEST 2/20/2022 11:51 am COMPARISON: 06/22/2021, 06/21/2021 HISTORY: ORDERING SYSTEM PROVIDED HISTORY: eval for pmn TECHNOLOGIST PROVIDED HISTORY: eval for pmn FINDINGS: The cardiomediastinal silhouette is unchanged in appearance.  Right internal jugular port in tissue calcification near the supra [place] IVC again demonstrated. Upper Abdomen: Coarse calcification near the medial and inferior aspect of the spleen partially visualized, as seen previously. Dense metallic artifact corresponding to metallic coil pack near the spleen again demonstrated. Cholelithiasis. Soft Tissues/Bones: Blastic metastatic deposits predominantly in the lower thoracic and upper lumbar spine again demonstrated. 1.  No evidence for acute pulmonary embolism. 2.  Bilateral pulmonary nodules, thoracic lymphadenopathy, soft tissue calcifications in the chest and upper abdomen and sclerotic bone lesions again demonstrated, as described previously, which may be related to the patient's history of malignancy. 3.  Small pericardial effusion. Trace left pleural effusion. 4.  Mild septal thickening suggestive of interstitial edema. XR ABDOMEN FOR NG/OG/NE TUBE PLACEMENT    Result Date: 2/22/2022  EXAMINATION: ONE SUPINE XRAY VIEW(S) OF THE ABDOMEN 2/22/2022 3:26 pm COMPARISON: 02/20/2022 HISTORY: NG tube placement. FINDINGS: Enteric tube tip is within the proximal stomach with side port several cm below the gastroesophageal junction. Embolization coils noted within the left upper quadrant. A large calcified density in the right upper quadrant is unchanged. Cholecystectomy clips noted. Partially visualized catheter along the left lower quadrant. Enteric tube is appropriately positioned. IR PLACE NG TUBE BY DR Rosalina Naylor    Result Date: 2/21/2022  PROCEDURE: XR PLACE NASOGASTRIC TUBE PHYS 2/21/2022 HISTORY: ORDERING SYSTEM PROVIDED HISTORY: ileus TECHNOLOGIST PROVIDED HISTORY: ileus Ileus CONTRAST: None SEDATION: None FLUOROSCOPY DOSE AND TYPE OR TIME AND EXPOSURES: Fluoro time 0.7 minutes  cGy cm 2 DESCRIPTION OF PROCEDURE: This procedure was performed by Irasema DAVID under indirect supervision of . [address] protocol was observed.   An attempt was made to pass a 16 [place] sump through right and left nostrils without success due to stricture in the nasal cavity. Under fluoroscopic guidance, through the left nostril, a 12 [place] sump nasogastric tube was negotiated into the stomach. With catheter manipulation, the tip of the catheter was manipulated into the distal stomach/proximal duodenum. Air bolus administration confirmed satisfactory tip position. The catheter was secured appropriately at 74 cm. Estimated blood loss was 0 mL. The patient tolerated the procedure well. Successful placement of nasogastric tube. Okay to use. MRI BRAIN W WO CONTRAST    Result Date: 2/24/2022  EXAMINATION: MRI OF THE BRAIN WITHOUT AND WITH CONTRAST  2/22/2022 10:37 am TECHNIQUE: Multiplanar multisequence MRI of the head/brain was performed without and with the administration of intravenous contrast. COMPARISON: None. HISTORY: ORDERING SYSTEM PROVIDED HISTORY: AMS. Rule out mets TECHNOLOGIST PROVIDED HISTORY: AMS. Rule out mets Reason for Exam: AMS. Rule out mets FINDINGS: 5 mm focus of increased signal on DWI images is noted within the medial aspect of right thalamus. There is no discrete corresponding signal abnormality on T2/FLAIR images. Finding is likely artifactual.  Acute lacunar infarction is less likely. There is no acute territorial infarction, intracranial hemorrhage, or intracranial mass lesion. No mass effect, midline shift, or extra-axial collection is noted. No focus of abnormal enhancement. There are mild nonspecific foci of periventricular and subcortical cerebral white matter T2/FLAIR hyperintensity, most likely representing chronic microangiopathic disease in this age group. The brain parenchyma is otherwise normal. The pituitary gland is normal in appearance. The cerebellar tonsils are in normal position. The ventricles, sulci, and cisterns are prominent suggestive of generalized volume loss. The intracranial flow voids are preserved.  The globes and orbits are within normal limits. The visualized extracranial structures including paranasal sinuses and mastoid air cells are unremarkable. No focus of abnormal enhancement to suggest intracranial metastatic disease. 5 mm focus of increased signal on DWI images is noted within the medial aspect of right thalamus. There is no discrete corresponding signal abnormality on T2/FLAIR images. Finding is likely artifactual. Acute lacunar infarction is less likely. There is no acute infarction, intracranial hemorrhage, or intracranial mass lesion. Mild chronic microangiopathic ischemic disease. Mild generalized volume loss. The findings were sent to the Radiology Results [address] at 6:55 pm on 2/24/2022to be communicated to a licensed caregiver.  RECOMMENDATIONS: Unavailable       Assessment & Differential Dx:      Primary Problem  Dehydration    Active Hospital Problems    Diagnosis Date Noted    Acute metabolic encephalopathy [Z22.39]     Confusion [R41.0]     Urinary tract infection without hematuria [N39.0]     Seizure disorder ([address] [G40.909]     Breakthrough seizure ([address] [G40.919]     Lactic acidosis [E87.2] 02/21/2022    Ileus ([address] [K56.7] 02/21/2022    Elevated troponin [R77.8] 02/21/2022    Pericardial effusion [I31.3] 02/21/2022    Hypokalemia [E87.6] 02/21/2022    Acute respiratory failure with hypoxia (HCC) [J96.01] 02/21/2022    Dehydration [E86.0] 02/20/2022    AMS (altered mental status) [R41.82] 02/20/2022    Ovarian cancer ([address] [C56.9] 05/29/2021    Chronic deep vein thrombosis (DVT) of femoral vein of left lower extremity ([address] [I82.512] 03/11/2021    Fatigue [R53.83]     Cancer, metastatic to bone ([address] [C79.51] 01/28/2021    Encephalopathy acute [G93.40]     Type 2 diabetes mellitus without complication, without long-term current use of insulin ([address] [E11.9]     Seizure ([address] [R56.9] 10/21/2020     Encephalopathy could be toxic related to UTI, if no improvement, would consider paraneoplastic encephalopathy.      Plan:     EEG and brain MRI reviewed  Keppra dose adjusted to 1000 mg bid orally due to behavioral changes that could be related to keppra  Lamictal 125 mg bid   keppra levels therapeutic level at 15 (nl 6-46)         Gary Moreno MD, MD, 2/26/2022 10:04 AM

## 2022-02-27 LAB
ABSOLUTE EOS #: 0.37 K/UL (ref 0–0.44)
ABSOLUTE IMMATURE GRANULOCYTE: 0.05 K/UL (ref 0–0.3)
ABSOLUTE LYMPH #: 1.25 K/UL (ref 1.1–3.7)
ABSOLUTE MONO #: 0.6 K/UL (ref 0.1–1.2)
ANION GAP SERPL CALCULATED.3IONS-SCNC: 12 MMOL/L (ref 9–17)
BASOPHILS # BLD: 1 % (ref 0–2)
BASOPHILS ABSOLUTE: 0.06 K/UL (ref 0–0.2)
BUN BLDV-MCNC: 9 MG/DL (ref 6–20)
CALCIUM SERPL-MCNC: 9.2 MG/DL (ref 8.6–10.4)
CHLORIDE BLD-SCNC: 105 MMOL/L (ref 98–107)
CO2: 25 MMOL/L (ref 20–31)
CREAT SERPL-MCNC: 0.38 MG/DL (ref 0.5–0.9)
EOSINOPHILS RELATIVE PERCENT: 4 % (ref 1–4)
GFR AFRICAN AMERICAN: >60 ML/MIN
GFR NON-AFRICAN AMERICAN: >60 ML/MIN
GFR SERPL CREATININE-BSD FRML MDRD: ABNORMAL ML/MIN/{1.73_M2}
GLUCOSE BLD-MCNC: 103 MG/DL (ref 65–105)
GLUCOSE BLD-MCNC: 127 MG/DL (ref 65–105)
GLUCOSE BLD-MCNC: 172 MG/DL (ref 65–105)
GLUCOSE BLD-MCNC: 85 MG/DL (ref 70–99)
GLUCOSE BLD-MCNC: 90 MG/DL (ref 65–105)
HCT VFR BLD CALC: 36.8 % (ref 36.3–47.1)
HEMOGLOBIN: 11.1 G/DL (ref 11.9–15.1)
IMMATURE GRANULOCYTES: 1 %
LYMPHOCYTES # BLD: 15 % (ref 24–43)
MCH RBC QN AUTO: 29.1 PG (ref 25.2–33.5)
MCHC RBC AUTO-ENTMCNC: 30.2 G/DL (ref 28.4–34.8)
MCV RBC AUTO: 96.3 FL (ref 82.6–102.9)
MONOCYTES # BLD: 7 % (ref 3–12)
NRBC AUTOMATED: 0 PER 100 WBC
PDW BLD-RTO: 17.5 % (ref 11.8–14.4)
PLATELET # BLD: 297 K/UL (ref 138–453)
PMV BLD AUTO: 9.5 FL (ref 8.1–13.5)
POTASSIUM SERPL-SCNC: 4 MMOL/L (ref 3.7–5.3)
RBC # BLD: 3.82 M/UL (ref 3.95–5.11)
RBC # BLD: ABNORMAL 10*6/UL
SEG NEUTROPHILS: 73 % (ref 36–65)
SEGMENTED NEUTROPHILS ABSOLUTE COUNT: 6.24 K/UL (ref 1.5–8.1)
SODIUM BLD-SCNC: 142 MMOL/L (ref 135–144)
WBC # BLD: 8.6 K/UL (ref 3.5–11.3)

## 2022-02-27 PROCEDURE — 36415 COLL VENOUS BLD VENIPUNCTURE: CPT

## 2022-02-27 PROCEDURE — 2580000003 HC RX 258: Performed by: STUDENT IN AN ORGANIZED HEALTH CARE EDUCATION/TRAINING PROGRAM

## 2022-02-27 PROCEDURE — 99232 SBSQ HOSP IP/OBS MODERATE 35: CPT | Performed by: PSYCHIATRY & NEUROLOGY

## 2022-02-27 PROCEDURE — 6370000000 HC RX 637 (ALT 250 FOR IP)

## 2022-02-27 PROCEDURE — C9113 INJ PANTOPRAZOLE SODIUM, VIA: HCPCS | Performed by: STUDENT IN AN ORGANIZED HEALTH CARE EDUCATION/TRAINING PROGRAM

## 2022-02-27 PROCEDURE — 97110 THERAPEUTIC EXERCISES: CPT

## 2022-02-27 PROCEDURE — 6370000000 HC RX 637 (ALT 250 FOR IP): Performed by: STUDENT IN AN ORGANIZED HEALTH CARE EDUCATION/TRAINING PROGRAM

## 2022-02-27 PROCEDURE — 99232 SBSQ HOSP IP/OBS MODERATE 35: CPT | Performed by: INTERNAL MEDICINE

## 2022-02-27 PROCEDURE — 97530 THERAPEUTIC ACTIVITIES: CPT

## 2022-02-27 PROCEDURE — 80048 BASIC METABOLIC PNL TOTAL CA: CPT

## 2022-02-27 PROCEDURE — 85025 COMPLETE CBC W/AUTO DIFF WBC: CPT

## 2022-02-27 PROCEDURE — A4216 STERILE WATER/SALINE, 10 ML: HCPCS | Performed by: STUDENT IN AN ORGANIZED HEALTH CARE EDUCATION/TRAINING PROGRAM

## 2022-02-27 PROCEDURE — 1200000000 HC SEMI PRIVATE

## 2022-02-27 PROCEDURE — 6360000002 HC RX W HCPCS: Performed by: STUDENT IN AN ORGANIZED HEALTH CARE EDUCATION/TRAINING PROGRAM

## 2022-02-27 RX ORDER — LEVETIRACETAM 500 MG/1
1000 TABLET ORAL 2 TIMES DAILY
Status: DISCONTINUED | OUTPATIENT
Start: 2022-02-27 | End: 2022-02-28 | Stop reason: HOSPADM

## 2022-02-27 RX ORDER — LORAZEPAM 1 MG/1
1 TABLET ORAL EVERY 6 HOURS PRN
Status: DISCONTINUED | OUTPATIENT
Start: 2022-02-27 | End: 2022-02-28 | Stop reason: HOSPADM

## 2022-02-27 RX ORDER — QUETIAPINE FUMARATE 25 MG/1
50 TABLET, FILM COATED ORAL NIGHTLY
Status: DISCONTINUED | OUTPATIENT
Start: 2022-02-27 | End: 2022-02-28

## 2022-02-27 RX ORDER — HYDROCORTISONE 25 MG/G
CREAM TOPICAL 2 TIMES DAILY PRN
Status: DISCONTINUED | OUTPATIENT
Start: 2022-02-27 | End: 2022-02-28 | Stop reason: HOSPADM

## 2022-02-27 RX ADMIN — MORPHINE SULFATE 1 MG: 4 INJECTION INTRAVENOUS at 08:18

## 2022-02-27 RX ADMIN — MORPHINE SULFATE 1 MG: 4 INJECTION INTRAVENOUS at 18:49

## 2022-02-27 RX ADMIN — APIXABAN 5 MG: 5 TABLET, FILM COATED ORAL at 20:50

## 2022-02-27 RX ADMIN — LAMOTRIGINE 125 MG: 100 TABLET ORAL at 20:50

## 2022-02-27 RX ADMIN — SODIUM CHLORIDE, PRESERVATIVE FREE 10 ML: 5 INJECTION INTRAVENOUS at 10:32

## 2022-02-27 RX ADMIN — LAMOTRIGINE 125 MG: 100 TABLET ORAL at 08:34

## 2022-02-27 RX ADMIN — LEVETIRACETAM 1000 MG: 500 TABLET, FILM COATED ORAL at 20:50

## 2022-02-27 RX ADMIN — QUETIAPINE FUMARATE 50 MG: 25 TABLET ORAL at 20:51

## 2022-02-27 RX ADMIN — SODIUM CHLORIDE, PRESERVATIVE FREE 10 ML: 5 INJECTION INTRAVENOUS at 08:31

## 2022-02-27 RX ADMIN — SODIUM CHLORIDE, PRESERVATIVE FREE 10 ML: 5 INJECTION INTRAVENOUS at 20:51

## 2022-02-27 RX ADMIN — SODIUM CHLORIDE, PRESERVATIVE FREE 10 ML: 5 INJECTION INTRAVENOUS at 08:32

## 2022-02-27 RX ADMIN — LEVETIRACETAM 1000 MG: 500 TABLET, FILM COATED ORAL at 10:26

## 2022-02-27 RX ADMIN — HYDROCORTISONE 2.5%: 25 CREAM TOPICAL at 20:51

## 2022-02-27 RX ADMIN — PANTOPRAZOLE SODIUM 40 MG: 40 INJECTION, POWDER, FOR SOLUTION INTRAVENOUS at 10:26

## 2022-02-27 RX ADMIN — APIXABAN 5 MG: 5 TABLET, FILM COATED ORAL at 10:26

## 2022-02-27 RX ADMIN — MORPHINE SULFATE 1 MG: 4 INJECTION INTRAVENOUS at 03:43

## 2022-02-27 RX ADMIN — MORPHINE SULFATE 1 MG: 4 INJECTION INTRAVENOUS at 14:14

## 2022-02-27 RX ADMIN — MORPHINE SULFATE 1 MG: 4 INJECTION INTRAVENOUS at 23:05

## 2022-02-27 ASSESSMENT — ENCOUNTER SYMPTOMS
DIARRHEA: 0
CHEST TIGHTNESS: 0
WHEEZING: 0
NAUSEA: 0
ABDOMINAL DISTENTION: 0
VOMITING: 0
SHORTNESS OF BREATH: 0
COUGH: 0
ABDOMINAL PAIN: 0

## 2022-02-27 ASSESSMENT — PAIN SCALES - GENERAL
PAINLEVEL_OUTOF10: 7
PAINLEVEL_OUTOF10: 2
PAINLEVEL_OUTOF10: 7
PAINLEVEL_OUTOF10: 7
PAINLEVEL_OUTOF10: 9
PAINLEVEL_OUTOF10: 8
PAINLEVEL_OUTOF10: 4

## 2022-02-27 ASSESSMENT — PAIN DESCRIPTION - PAIN TYPE: TYPE: ACUTE PAIN

## 2022-02-27 ASSESSMENT — PAIN DESCRIPTION - LOCATION: LOCATION: HEAD

## 2022-02-27 NOTE — PLAN OF CARE
Problem: Pain:  Goal: Pain level will decrease  Description: Pain level will decrease  2/27/2022 0517 by Judy Cabrera RN  Outcome: Ongoing  2/26/2022 1811 by Ariela Raya RN  Outcome: Ongoing  Goal: Control of acute pain  Description: Control of acute pain  2/27/2022 0517 by Judy Cabrera RN  Outcome: Ongoing  2/26/2022 1811 by Ariela Raya RN  Outcome: Ongoing  Goal: Control of chronic pain  Description: Control of chronic pain  2/27/2022 0517 by Judy Cabrera RN  Outcome: Ongoing  2/26/2022 1811 by Ariela Raya RN  Outcome: Ongoing     Problem: Nausea/Vomiting:  Goal: Absence of nausea/vomiting  Description: Absence of nausea/vomiting  2/27/2022 0517 by Judy Cabrera RN  Outcome: Ongoing  2/26/2022 1811 by Ariela Raya RN  Outcome: Ongoing  Goal: Able to drink  Description: Able to drink  2/27/2022 0517 by Judy Cabrera RN  Outcome: Ongoing  2/26/2022 1811 by Ariela Raya RN  Outcome: Ongoing  Goal: Able to eat  Description: Able to eat  2/27/2022 0517 by Judy Cabrera RN  Outcome: Ongoing  2/26/2022 1811 by Ariela Raya RN  Outcome: Ongoing  Goal: Ability to achieve adequate nutritional intake will improve  Description: Ability to achieve adequate nutritional intake will improve  2/27/2022 0517 by Judy Cabrera RN  Outcome: Ongoing  2/26/2022 1811 by Ariela Raya RN  Outcome: Ongoing     Problem: Infection - Central Venous Catheter-Associated Bloodstream Infection:  Goal: Will show no infection signs and symptoms  Description: Will show no infection signs and symptoms  2/27/2022 0517 by Judy Cabrera RN  Outcome: Ongoing  2/26/2022 1811 by Ariela Raya RN  Outcome: Ongoing     Problem: Skin Integrity:  Goal: Will show no infection signs and symptoms  Description: Will show no infection signs and symptoms  2/27/2022 0517 by Judy Cabrera RN  Outcome: Ongoing  2/26/2022 1811 by Ariela Raya RN  Outcome: Ongoing  Goal: Absence of new skin breakdown  Description: Absence of new skin breakdown  2/27/2022 0517 by Romain Mccartney RN  Outcome: Ongoing  2/26/2022 1811 by Isaiah Shipman RN  Outcome: Ongoing     Problem: Falls - Risk of:  Goal: Will remain free from falls  Description: Will remain free from falls  2/27/2022 0517 by Romain Mccartney RN  Outcome: Ongoing  2/26/2022 1811 by Isaiah Shipman RN  Outcome: Ongoing  Goal: Absence of physical injury  Description: Absence of physical injury  2/27/2022 0517 by Romain Mccartney RN  Outcome: Ongoing  2/26/2022 1811 by Isaiah Shipman RN  Outcome: Ongoing     Problem: Musculor/Skeletal Functional Status  Goal: Highest potential functional level  2/27/2022 0517 by Romain Mccartney RN  Outcome: Ongoing  2/26/2022 1811 by Isaiah Shipman RN  Outcome: Ongoing     Problem: Mental Status - Impaired:  Goal: Mental status will improve  Description: Mental status will improve  2/27/2022 0517 by Romain Mccartney RN  Outcome: Ongoing  2/26/2022 1811 by Isaiah Shipman RN  Outcome: Ongoing

## 2022-02-27 NOTE — PROGRESS NOTES
Physical Therapy  Facility/Department: [facility] ONC/MED SURG  Daily Treatment Note  NAME: David Lopez  : 1965  MRN: [id number]    Date of Service: 2022    Discharge Recommendations:  Patient would benefit from continued therapy after discharge   PT Equipment Recommendations  Equipment Needed: No  Other: Pt has RW at home    Assessment   Body structures, Functions, Activity limitations: Decreased functional mobility ; Decreased strength;Decreased endurance  Assessment: Pt able to ambulate 250ft with RW and SBA without LOB noted, as well as completed standing exercise with assist of RW for UE with only one seated rest break between. Pt will benefit from continued acute PT to further increase overall endurance and strength. Prognosis: Good  PT Education: Goals;PT Role;Plan of Care  REQUIRES PT FOLLOW UP: Yes     Patient Diagnosis(es): The primary encounter diagnosis was Confusion. Diagnoses of Fatigue, unspecified type, Dehydration, and Hypoxia were also pertinent to this visit. has a past medical history of Anemia, Bleeding, Cervical cancer ([facility] [address] Depression, Diabetes mellitus ([address] GERD (gastroesophageal reflux disease), Hx of blood clots, Hypertension, Metastatic cancer ([facility] [address] Ovarian cancer ([address] Post chemo evaluation, and Splenic lesion. has a past surgical history that includes Hysterectomy, total abdominal; Port Surgery; Tonsillectomy; IR PORT PLACEMENT > 5 YEARS (2020); Anus surgery; Abscess Drainage (); colectomy (2013); IR EMBOLIZATION HEMORRHAGE (10/05/2020); and Cardiac catheterization. Restrictions  Restrictions/Precautions  Restrictions/Precautions: Seizure,Fall Risk  Required Braces or Orthoses?: No  Position Activity Restriction  Other position/activity restrictions: Up with assist ;medical history of ovarian cancer with metastasis s/p chemo and radiation therapy;  Seizure-like activity -daugther stated pt does present different for 4-5 days post sx typically. Subjective   General  Chart Reviewed: Yes  Response To Previous Treatment: Patient with no complaints from previous session. Family / Caregiver Present: Yes (dtr)  Subjective  Subjective: Pt resting in bed and eager to ambulate this date. States to having some low back pain but recently recieved pain medication which has helped. Pain Screening  Patient Currently in Pain: Yes  Pain Assessment  Pain Assessment: 0-10  Pain Level: 2  Response to Pain Intervention: Patient Satisfied  Vital Signs  Patient Currently in Pain: Yes       Orientation  Orientation  Overall Orientation Status: Within Normal Limits  Cognition      Objective   Bed mobility  Supine to Sit: Stand by assistance  Sit to Supine: Stand by assistance  Transfers  Sit to Stand: Contact guard assistance  Stand to sit: Contact guard assistance  Comment: Cues for safe hand placement  Ambulation  Ambulation?: Yes  Ambulation 1  Surface: level tile  Device: Rolling Walker  Assistance: Stand by assistance  Quality of Gait: Decreased gerald however no LOB noted  Distance: 250ft, 100ft with seated rest break between     Balance  Posture: Good  Sitting - Static: Good  Sitting - Dynamic: Good  Standing - Static: Good;-  Standing - Dynamic: Fair;+  Comments: Assessed with RW. Exercises  Comments: Standing exercise program: Heel/toe raises, hip flexion, hip abduction, mini squats, hip extension, and hamstring curls.  Reps: 10x AROM with seated rest break between                          G-Code     OutComes Score                                                     AM-PAC Score     AM-PAC Inpatient Mobility without Stair Climbing Raw Score : 18 (02/27/22 1514)  AM-PAC Inpatient without Stair Climbing T-Scale Score : 51.97 (02/27/22 1514)  Mobility Inpatient CMS 0-100% Score: 23.26 (02/27/22 1514)  Mobility Inpatient without Stair CMS G-Code Modifier : CJ (02/27/22 1514)       Goals  Short term goals  Time Frame for Short term goals: 10 visits  Short term goal 1: transfers with SBA  Short term goal 2: amb 150 ft with a RW x SBA  Short term goal 3: ascend/descend 4steps with SBA  Short term goal 4: 20 min exercise program x SBA    Plan    Plan  Times per week: 5-6x wk  Times per day: Daily  Current Treatment Recommendations: Strengthening,Functional Mobility Training,Transfer Training,Gait Training,Cognitive Reorientation,Safety Education & Training,Balance Training,Endurance Training,Stair training  Safety Devices  Type of devices: Call light within reach,Left in bed,Gait belt,Nurse notified  Restraints  Initially in place: No     Therapy Time   Individual Concurrent Group Co-treatment   Time In 1442         Time Out 1510         Minutes 1551 [address]

## 2022-02-27 NOTE — PROGRESS NOTES
[facility]  Internal Medicine Teaching Residency Program  Inpatient Daily Progress Note  ______________________________________________________________________________    Patient: Francie Paulino [facility]  YOB: 1965   SMT:[id number]    Acct: [de-identified]     Room: [address] [place] [address]  Admit date: 2/20/2022  Today's date: 02/27/22  Number of days in the hospital: 7    SUBJECTIVE   Admitting Diagnosis: Dehydration  CC: Altered mental status, Nausea and vomiting. Patient was seen and evaluated at bedside, no acute events overnight, hemodynamically stable. She continued to have hallucinations but is otherwise awake, alert and oriented to time, place and person. Labs and imaging reviewed,  BMP and CBC unremarkable. ROS:  Constitutional:  negative for chills, fevers, sweats  Respiratory:  negative for cough, hemoptysis, wheezing  Cardiovascular:  negative for chest pain, chest pressure/discomfort, lower extremity edema, palpitations  Gastrointestinal:  negative for abdominal pain, constipation, diarrhea, nausea, vomiting  Neurological: Positive for auditory and visual hallucination.      BRIEF HISTORY     The patient is a 52-year-old female with significant past medical history of ovarian cancer with metastasis s/p chemo and radiotherapy, seizure disorder on Lamictal and Keppra who presents with altered mental status, nausea and vomiting. 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 She had surgical debulking and systemic treatment with Taxol and carboplatin for 6 cycles. She was in remission for about 2 years. She had a relapse in 2007 and was treated with topotecan with good results. Patient relapsed again in 2008 and was treated with Taxol and carboplatin. After 3 cycles of systemic chemotherapy she had problems with carboplatin so she finished 3 more cycles with Taxol.   She did well again for 2 to 3 years and then she had a relapse in 2012 and had intraperitoneal chemotherapy treatment with Taxol. Patient was last seen by her oncologist in 2014 at which time she had relatively stable disease with normal tumor marker and no significant abnormal images. Patient moved to [place] after that and she was lost for oncology follow-ups. Patient states that she came back to [place] 3 years ago and has been following with an oncologist since. Patient states her last chemotherapy was last week on February 18 2, 2022. She received Gemzar for chemotherapy and nausea, vomiting, diarrhea, mouth sores, drowsiness, muscle aches are some of the known side effects of Gemzar. Reimaging confirmed metastatic relapse and biopsy confirmed ovarian cancer recurrence. Scan showed extensive intra-abdominal and splenic involvement and lung metastasis. CT brain was unremarkable on 2/20/2022. Patient currently has extensive intra-abdominal, splenic and lung involvement from ovarian cancer. Patient with recent intra-abdominal bleeding due to splenic mass with GI infiltration s/p embolization. She is currently on palliative treatment with Doxil. Patient recently had a DVT last week for which she was started on Eliquis. Patient's last chemotherapy was on 2/18/2022 when she received Gemzar for chemotherapy       PMH/Home meds:  Metastatic ovarian cancer with mets to liver, spleen and lungs - palliative chemotherapy  Seizure disorder-Lamictal and Keppra  Type 2 diabetes  DVT on Eliquis  Depression  Anemia on iron supplements  Protonix  Percocet for pain     ER Course  On arrival patient afebrile, hypertensive with blood pressure between 465E to 403J systolic, on 2 L of oxygen via nasal cannula. Patient does not use any home oxygen.   Patient's lab at time of admission showed sodium 142, potassium 3.4, bicarb 22, creatinine 0.47, glucose 150, lactic acid 2.8 which improved to 1.5  Patient's proBNP 328, troponins 51-57-17  LFTs normal  Blood glucose 115  Prolactin level 4.52  Keppra levels within normal limits  Lamictal levels 1.3  Patient had leukocytosis 20, hemoglobin 11.9 stable, platelets 188. Blood cultures and urine cultures were sent  UA unremarkable with 2+ glucosuria but no leukocyte esterase or bacteriuria or WBCs. CT chest for PE was negative, no concerns for pneumonia, showed pericardial effusion which was present on previous CTs as well  CT head was negative  X-ray chest was unremarkable  Abdominal x-ray showed ileus  EKG was unremarkable with 467 QTC  Patient received 1 L fluid bolus in the ER and started on fluids. Patient was transferred to medicine service for further management. OBJECTIVE     Vital Signs:  BP (!) 151/71   Pulse 74   Temp 98.6 °F (37 °C) (Oral)   Resp 18   Ht 5' 5\" (1.651 m)   Wt 163 lb 12.8 oz (74.3 kg)   SpO2 98%   BMI 27.26 kg/m²     Temp (24hrs), Av.5 °F (36.9 °C), Min:98.4 °F (36.9 °C), Max:98.6 °F (37 °C)    In: 240   Out:  [Urine:]    Physical Exam:  General:  Awake but confused. HEENT: Atraumatic, normocephalic, no throat congestion, moist mucosa. Hearing loss noted. Eyes:   PERRLA  Neck:   Supple  Chest:   Clear on auscultation. no rales or ronchi  Cardiac:  S1 S2 RR, no gallops or rubs. Systolic murmur heard. Abdomen: Soft, non-tender, no masses or organomegaly. Bowel sounds diminished. :   No suprapubic or flank tenderness. Neuro:  Unable to assess, no focal neurological deficit  SKIN:  No rashes, good skin turgor. Extremities:  No edema.     Medications:  Scheduled Medications:    levetiracetam  1,000 mg IntraVENous Q12H    lamoTRIgine  125 mg Oral BID    nicotine  1 patch TransDERmal Daily    enoxaparin  40 mg SubCUTAneous BID    sodium chloride flush  5-40 mL IntraVENous 2 times per day    sodium chloride flush  5-40 mL IntraVENous 2 times per day    pantoprazole  40 mg IntraVENous Daily    And    sodium chloride (PF)  10 mL IntraVENous Daily Continuous Infusions:    dextrose      sodium chloride      sodium chloride       PRN Medicationsmelatonin, 5 mg, Nightly PRN  glucose, 15 g, PRN  dextrose, 12.5 g, PRN  glucagon (rDNA), 1 mg, PRN  dextrose, 100 mL/hr, PRN  labetalol, 10 mg, Q6H PRN   Or  hydrALAZINE, 10 mg, Q6H PRN  LORazepam, 0.5 mg, Q4H PRN  bisacodyl, 10 mg, Daily PRN  albuterol, 2.5 mg, As Directed RT PRN  sodium chloride flush, 5-40 mL, PRN  sodium chloride, 25 mL, PRN  sodium chloride flush, 5-40 mL, PRN  sodium chloride, 25 mL, PRN  acetaminophen, 650 mg, Q6H PRN   Or  acetaminophen, 650 mg, Q6H PRN  potassium chloride, 40 mEq, PRN   Or  potassium alternative oral replacement, 40 mEq, PRN   Or  potassium chloride, 10 mEq, PRN  morphine, 1 mg, Q4H PRN  promethazine, 12.5 mg, Q6H PRN   Or  ondansetron, 4 mg, Q6H PRN        Diagnostic Labs:  CBC:   Recent Labs     02/25/22  0610 02/26/22  0721 02/27/22  0611   WBC 3.5 5.0 8.6   RBC 3.99 3.72* 3.82*   HGB 11.2* 10.9* 11.1*   HCT 37.7 35.7* 36.8   MCV 94.5 96.0 96.3   RDW 16.5* 17.3* 17.5*    356 297     BMP:   Recent Labs     02/25/22  0610 02/26/22  0721 02/27/22  0611    142 142   K 3.4* 4.2 4.0    107 105   CO2 21 25 25   BUN 8 7 9   CREATININE 0.46* 0.39* 0.38*     BNP: No results for input(s): BNP in the last 72 hours. PT/INR: No results for input(s): PROTIME, INR in the last 72 hours. APTT: No results for input(s): APTT in the last 72 hours. CARDIAC ENZYMES: No results for input(s): CKMB, CKMBINDEX, TROPONINI in the last 72 hours. Invalid input(s): CKTOTAL;3  FASTING LIPID PANEL:  Lab Results   Component Value Date    CHOL 131 03/10/2021    HDL 33 (L) 03/10/2021    TRIG 147 03/10/2021     LIVER PROFILE:   No results for input(s): AST, ALT, ALB, BILIDIR, BILITOT, ALKPHOS in the last 72 hours.    MICROBIOLOGY:   Lab Results   Component Value Date/Time    CULTURE NO GROWTH 5 DAYS 02/20/2022 11:20 AM       Imaging:    XR ABDOMEN (KUB) (SINGLE AP VIEW)    Result Date: 2/20/2022  Ileus. Probable gallstone. Catheter left abdomen and pelvis requires clinical correlation. CT Head WO Contrast    Result Date: 2/20/2022  No acute intracranial abnormality. XR CHEST PORTABLE    Result Date: 2/20/2022  Chronic findings in the chest without acute airspace disease identified. CT CHEST PULMONARY EMBOLISM W CONTRAST    Result Date: 2/20/2022  1. No evidence for acute pulmonary embolism. 2.  Bilateral pulmonary nodules, thoracic lymphadenopathy, soft tissue calcifications in the chest and upper abdomen and sclerotic bone lesions again demonstrated, as described previously, which may be related to the patient's history of malignancy. 3.  Small pericardial effusion. Trace left pleural effusion. 4.  Mild septal thickening suggestive of interstitial edema. CT abdomen from 2/22/2022  Impression   1. Disease progression with increased size of bilateral pulmonary metastases,   slightly increased size of right retrocrural and left inguinal nannette   metastases, and possible slight increase in size of peritoneal metastases. Abdominal and pelvic nannette metastases and skeletal metastases appear   unchanged. 2. Persistent trace bilateral pleural effusions and small pericardial   effusion, likely malignant in etiology. 3. Worsened stricturing of the mid sigmoid colon with no obstruction at this   time.  This may be a sequela of prior treatment to the area.    4. The gastric tube has been retracted with tip now at the gastroesophageal   junction and the sideport not included.  Recommend advancement             ASSESSMENT & PLAN     ASSESSMENT / PLAN:     Principal Problem:    Dehydration  Active Problems:    Seizure ([address]    Type 2 diabetes mellitus without complication, without long-term current use of insulin (HCC)    Encephalopathy acute    Cancer, metastatic to bone (HCC)    Fatigue    Chronic deep vein thrombosis (DVT) of femoral vein of left lower extremity (HCC)    Ovarian cancer ([facility] [address]    AMS (altered mental status)    Lactic acidosis    Ileus (HCC)    Elevated troponin    Pericardial effusion    Hypokalemia    Acute respiratory failure with hypoxia (HCC)    Acute metabolic encephalopathy    Confusion    Urinary tract infection without hematuria    Seizure disorder (HCC)    Breakthrough seizure ([facility] [address]  Resolved Problems:    * No resolved hospital problems. *    IMPRESSION  This is a 60 y.o. female with a PMH of ovarian cancer with metastasis post chemotherapy/radiotherapy couple of days ago  presented with N/V, chills, fatigue, weakess and altered mental status. Patient also has elevated troponins for which cardiology is on board and new leukocytosis for which hematology is following. Patient admitted to inpatient status for further managament. 1. Acute metabolic encephalopathy: Resolved  - Secondary to dehydration versus electrolyte abnormalities versus UTI vs seizures. - MRI brain negative for new metastasis or stroke. - Completed Unasyn and received iv lfuids, currently on pureed diet as per Speech recommendations. Follow recommendations for diet.   - Has auditory and visual hallucinations, plan to consult Psychiatry. - Continue seroquel 25 mg nightly. Continue PRN ativan and MS contin. - Follow up Neurology recommendations. 2. Seizures:   - MRI does not show acute abnormality. EEG showed Frequent left occipital temporal lateralized periodic discharges. - Continue Keppra dose adjusted to 1000 mg bid orally and Lamictal 125 mg bid   - Follow up Neurology recommendations. 3. Recurrent ovarian cancer with extensive metastasis  - Chemotherapy using gemcitabine with stable CA-125 and with side effects abdominal pain nausea and vomiting. Heme-onc on board. Appreciate recommendations. MRI brain shows no metastasis. No chemotherapy IP. 4. Chronic DVT of femoral vein of lower extremity:   - Continue eliquis. 5. Hypokalemia: Resolved    6.  Ileus: Secondary to hypokalemia-replaced. NG tube is removed and ileus is resolved. 7. Acute respiratory failure with hypoxia with unclear etiology: Resolved. Likely secondary to pulmonary metastasis. She is currently saturating well above 92% on room air. Will continue to monitor. 8. Lactic acidosis- resolved. DVT ppx: on Eliquis  GI ppx: Protonix     PT/OT/SW: Consulted  Discharge Planning: Case management consulted.     Hever Staley MD      Department of Internal Medicine  [facility]         2/27/2022, 9:39 AM

## 2022-02-27 NOTE — PLAN OF CARE
Problem: Pain:  Goal: Pain level will decrease  Description: Pain level will decrease  2/27/2022 1820 by Leena Garcia RN  Outcome: Ongoing  2/27/2022 0517 by Chantal Mobley RN  Outcome: Ongoing  Goal: Control of acute pain  Description: Control of acute pain  2/27/2022 1820 by Leena Garcia RN  Outcome: Ongoing  2/27/2022 0517 by Chantal Mobley RN  Outcome: Ongoing  Goal: Control of chronic pain  Description: Control of chronic pain  2/27/2022 1820 by Leena Garcia RN  Outcome: Ongoing  2/27/2022 0517 by Chantal Mobley RN  Outcome: Ongoing     Problem: Nausea/Vomiting:  Goal: Absence of nausea/vomiting  Description: Absence of nausea/vomiting  2/27/2022 1820 by Leena Garcia RN  Outcome: Ongoing  2/27/2022 0517 by Chantal Mobley RN  Outcome: Ongoing  Goal: Able to drink  Description: Able to drink  2/27/2022 1820 by Leena Garcia RN  Outcome: Ongoing  2/27/2022 0517 by Chantal Mobley RN  Outcome: Ongoing  Goal: Able to eat  Description: Able to eat  2/27/2022 1820 by Leena Garcia RN  Outcome: Ongoing  2/27/2022 0517 by Chantal Mobley RN  Outcome: Ongoing  Goal: Ability to achieve adequate nutritional intake will improve  Description: Ability to achieve adequate nutritional intake will improve  2/27/2022 1820 by Leena Garcia RN  Outcome: Ongoing  2/27/2022 0517 by Chantal Mobley RN  Outcome: Ongoing     Problem: Infection - Central Venous Catheter-Associated Bloodstream Infection:  Goal: Will show no infection signs and symptoms  Description: Will show no infection signs and symptoms  2/27/2022 1820 by Leena Garcia RN  Outcome: Ongoing  2/27/2022 0517 by Chantal Mobley RN  Outcome: Ongoing     Problem: Skin Integrity:  Goal: Will show no infection signs and symptoms  Description: Will show no infection signs and symptoms  2/27/2022 1820 by Leena Garcia RN  Outcome: Ongoing  2/27/2022 0517 by Chantal Mobley RN  Outcome: Ongoing  Goal: Absence of new skin breakdown  Description: Absence of new skin breakdown  2/27/2022 1820 by Leena Garcia RN  Outcome: Ongoing  2/27/2022 0517 by Chantal Mobley RN  Outcome: Ongoing     Problem: Falls - Risk of:  Goal: Will remain free from falls  Description: Will remain free from falls  2/27/2022 1820 by Leena Garcia RN  Outcome: Ongoing  2/27/2022 0517 by Chantal Mobley RN  Outcome: Ongoing  Goal: Absence of physical injury  Description: Absence of physical injury  2/27/2022 1820 by Leena Garcia RN  Outcome: Ongoing  2/27/2022 0517 by Chantal Mobley RN  Outcome: Ongoing     Problem: Musculor/Skeletal Functional Status  Goal: Highest potential functional level  2/27/2022 1820 by Leena Garcia RN  Outcome: Ongoing  2/27/2022 0517 by Chantal Mobley RN  Outcome: Ongoing     Problem: Mental Status - Impaired:  Goal: Mental status will improve  Description: Mental status will improve  2/27/2022 1820 by Leena Garcia RN  Outcome: Ongoing  2/27/2022 0517 by Chantal Mobley RN  Outcome: Ongoing

## 2022-02-27 NOTE — PROGRESS NOTES
Today's Date: 2/27/2022  Patient Name: David Lopez  Date of admission: 2/20/2022  9:53 AM  Patient's age: 59 y.o., 1965  Admission Dx: Dehydration [E86.0]  Confusion [R41.0]  Hypoxia [R09.02]  Fatigue, unspecified type [R53.83]  AMS (altered mental status) [R41.82]    Reason for Consult: management recommendations  Requesting Physician: Anca Cochran MD    CHIEF COMPLAINT: Nausea vomiting abdominal pain. History Obtained From:  patient, electronic medical record    Interval history:    Patient seen and examined  Labs and vitals reviewed  Mentation is much improved  Denies new complaint  Labs are stable. HISTORY OF PRESENT ILLNESS:      The patient is a 59 y.o.  female who is admitted to the hospital for chief complains abdominal pain nausea vomiting which started yesterday. Patient's oncologic history is as below. Patient also recently had a DVT for which she has been started on Eliquis. Patient had a KUB done which shows findings of ileus. Patient last CT abdomen pelvis from 10/29. Patient is also on antiseizure medication. Patient is admitted with above complaints. Underwent placement of NG tube per IR. Patient is well-known to our practice. She has history of recurrent ovarian cancer. Patient is currently on cytotoxic therapy using Gemzar and has been tolerating it well. Patient last Ca1 25 was relatively stable. Patient CT chest from 2/20/2022 shows bilateral lung nodules thoracic adenopathy and sclerotic bony lesions      Past Medical History:   has a past medical history of Anemia, Bleeding, Cervical cancer ([address] Depression, Diabetes mellitus ([address] GERD (gastroesophageal reflux disease), Hx of blood clots, Hypertension, Metastatic cancer ([address] Ovarian cancer ([address] Post chemo evaluation, and Splenic lesion. Past Surgical History:   has a past surgical history that includes Hysterectomy, total abdominal; Port Surgery;  Tonsillectomy; IR PORT PLACEMENT > 5 YEARS (08/24/2020); Anus surgery; Abscess Drainage (2013); colectomy (03/2013); IR EMBOLIZATION HEMORRHAGE (10/05/2020); and Cardiac catheterization. Medications:    Prior to Admission medications    Medication Sig Start Date End Date Taking? Authorizing Provider   LORazepam (ATIVAN) 1 MG tablet Take 1 tablet by mouth every 8 hours as needed for Anxiety for up to 30 doses. 2/18/22 4/2/22  Abel Pfeiffer MD   polyethylene glycol (MIRALAX) 17 g packet Take 17 g by mouth daily as needed for Constipation 2/18/22 3/20/22  Abel Pfeiffer MD   lamoTRIgine (LAMICTAL) 25 MG tablet TAKE 3 TABS IN IN THE MORNING AND 3 TABS IN IN THE EVENING 2/9/22   Jr Hart MD   levETIRAcetam (KEPPRA) 750 MG tablet Take 2 tablets by mouth 2 times daily 2/9/22   Renee Suarez MD   diazePAM 5 MG/0.1ML LIQD 1 puff by Nasal route as needed (seizures)  Patient not taking: Reported on 2/18/2022 2/9/22   Renee Suarez MD   pantoprazole (PROTONIX) 40 MG tablet Take 1 tablet by mouth daily 1/3/22   Abel Pfeiffer MD   ondansetron (ZOFRAN-ODT) 4 MG disintegrating tablet Take 1 tablet by mouth every 8 hours as needed for Nausea or Vomiting 12/3/21   Abel Pfeiffer MD   naloxegol (MOVANTIK) 12.5 MG TABS tablet Take 1 tablet by mouth every morning  Patient not taking: Reported on 2/18/2022 11/15/21   Abel Pfeiffer MD   ferrous sulfate (IRON 325) 325 (65 Fe) MG tablet Take 1 tablet by mouth daily (with breakfast) 10/31/21   Gina Santos MD   morphine (MSIR) 30 MG tablet Take 30 mg by mouth 2 times daily as needed for Pain.     Historical Provider, MD   sertraline (ZOLOFT) 100 MG tablet TAKE 1 TABLET BY MOUTH DAILY 10/4/21 2/18/22  Abel Pfeiffer MD   ELIQUIS 5 MG TABS tablet Take 5 mg by mouth 2 times daily  5/26/21   Historical Provider, MD     Current Facility-Administered Medications   Medication Dose Route Frequency Provider Last Rate Last Admin    apixaban (ELIQUIS) tablet 5 mg  5 mg Oral BID Janae Sharp MD   5 mg at 02/27/22 1026    LORazepam (ATIVAN) tablet 1 mg  1 mg Oral Q6H PRN Janae Sharp MD        levETIRAcetam (KEPPRA) tablet 1,000 mg  1,000 mg Oral BID Linwood Spencer MD   1,000 mg at 02/27/22 1026    lamoTRIgine (LAMICTAL) tablet 125 mg  125 mg Oral BID Sophia Baldwin MD   125 mg at 02/27/22 0834    melatonin tablet 5 mg  5 mg Oral Nightly PRN Logan Hess MD        glucose (GLUTOSE) 40 % oral gel 15 g  15 g Oral PRN Janae Sharp MD        dextrose 50 % IV solution  12.5 g IntraVENous PRN Janae Sharp MD        glucagon (rDNA) injection 1 mg  1 mg IntraMUSCular PRN Janae Sharp MD        dextrose 5 % solution  100 mL/hr IntraVENous PRN Janae Sharp MD        nicotine (NICODERM CQ) 7 MG/24HR 1 patch  1 patch TransDERmal Daily Rosy Mera MD   1 patch at 02/27/22 0834    labetalol (NORMODYNE;TRANDATE) injection 10 mg  10 mg IntraVENous Q6H PRN Sophia Baldwin MD        Or    hydrALAZINE (APRESOLINE) injection 10 mg  10 mg IntraVENous Q6H PRN Sophia Baldwin MD        bisacodyl (DULCOLAX) suppository 10 mg  10 mg Rectal Daily PRN Sophia Baldwin MD        albuterol (PROVENTIL) nebulizer solution 2.5 mg  2.5 mg Nebulization As Directed RT PRN Sophia Baldwin MD        sodium chloride flush 0.9 % injection 5-40 mL  5-40 mL IntraVENous 2 times per day Noble Rana, DO   10 mL at 02/27/22 0248    sodium chloride flush 0.9 % injection 5-40 mL  5-40 mL IntraVENous PRN Noble Rana, DO        0.9 % sodium chloride infusion  25 mL IntraVENous PRN Noble Rana, DO        sodium chloride flush 0.9 % injection 5-40 mL  5-40 mL IntraVENous 2 times per day Sophia Baldwin MD   10 mL at 02/27/22 0831    sodium chloride flush 0.9 % injection 5-40 mL  5-40 mL IntraVENous PRN Sophia Baldwin MD   10 mL at 02/22/22 0918    0.9 % sodium chloride infusion  25 mL IntraVENous PRN Sophia Baldwin MD        acetaminophen (TYLENOL) tablet 650 mg  650 mg Oral Q6H PRN Sophia Baldwin MD   650 mg at 22 1625    Or    acetaminophen (TYLENOL) suppository 650 mg  650 mg Rectal Q6H PRN Sophia Baldwin MD        potassium chloride (KLOR-CON M) extended release tablet 40 mEq  40 mEq Oral PRN Sophia Baldwin MD   40 mEq at 22 1930    Or    potassium bicarb-citric acid (EFFER-K) effervescent tablet 40 mEq  40 mEq Oral PRN Sophia Baldwin MD        Or    potassium chloride 10 mEq/100 mL IVPB (Peripheral Line)  10 mEq IntraVENous PRN Sophia Baldwin MD        morphine (PF) injection 1 mg  1 mg IntraVENous Q4H PRN Sophia Baldwin MD   1 mg at 22 0818    pantoprazole (PROTONIX) injection 40 mg  40 mg IntraVENous Daily Sophia Baldwin MD   40 mg at 22 1026    And    sodium chloride (PF) 0.9 % injection 10 mL  10 mL IntraVENous Daily Sophia Baldwin MD   10 mL at 22 1032    promethazine (PHENERGAN) tablet 12.5 mg  12.5 mg Oral Q6H PRN Sophia Baldwin MD   12.5 mg at 22 2305    Or    ondansetron (ZOFRAN) injection 4 mg  4 mg IntraVENous Q6H PRN Sophia Baldwin MD   4 mg at 22 2225       Allergies:  Ceftriaxone    Social History:   reports that she has been smoking cigarettes. She has been smoking about 1.00 pack per day. She uses smokeless tobacco. She reports previous alcohol use. She reports that she does not use drugs. Family History: family history includes Alcohol Abuse in her mother; Cirrhosis in her mother.     REVIEW OF SYSTEMS:      Patient not able to give any meaningful history due to altered mental status    PHYSICAL EXAM:        BP (!) 149/64   Pulse 60   Temp 98.2 °F (36.8 °C) (Oral)   Resp 18   Ht 5' 5\" (1.651 m)   Wt 163 lb 12.8 oz (74.3 kg)   SpO2 100%   BMI 27.26 kg/m²    Temp (24hrs), Av.4 °F (36.9 °C), Min:98.2 °F (36.8 °C), Max:98.6 °F (37 °C)      General appearance -sick appearing, no in pain or distress   Mental status -confused  Eyes - pupils equal and reactive, extraocular eye movements intact   Ears - bilateral TM's and external ear canals normal   Mouth - mucous membranes moist, pharynx normal without lesions. NG tube in place  Neck - supple, no significant adenopathy   Lymphatics - no palpable lymphadenopathy, no hepatosplenomegaly   Chest - clear to auscultation, no wheezes, rales or rhonchi, symmetric air entry   Heart - normal rate, regular rhythm, normal S1, S2, no murmurs  Abdomen - soft, nontender, nondistended, no masses or organomegaly   Neurological -confused and not following commands  Musculoskeletal - no joint tenderness, deformity or swelling   Extremities - peripheral pulses normal, no pedal edema, no clubbing or cyanosis   Skin - normal coloration and turgor, no rashes, no suspicious skin lesions noted ,      DATA:      Labs:     Results for orders placed or performed during the hospital encounter of 02/20/22   Culture, Urine    Specimen: Urine, clean catch   Result Value Ref Range    Specimen Description . CLEAN CATCH URINE     Culture ESCHERICHIA COLI 10 to 50,000 CFU/ML        Susceptibility    Escherichia coli - BACTERIAL SUSCEPTIBILITY PANEL TAQUERIA     ampicillin <=2 Sensitive      aztreonam <=1 Sensitive      ceFAZolin* <=4 Sensitive       * Cefazolin sensitivity results can be used to predict the effectiveness of oral cephalosporins (eg. Cephalexin) in uncomplicated Urinary Tract Infections due to E. coli, K. pneumoniae, and P. mirabilis     cefTRIAXone <=1 Sensitive      ciprofloxacin <=0.25 Sensitive      Confirmatory Extended Spectrum Beta-Lactamase NEGATIVE Negative      gentamicin <=1 Sensitive      nitrofurantoin <=16 Sensitive      tobramycin <=1 Sensitive      trimethoprim-sulfamethoxazole <=20 Sensitive      piperacillin-tazobactam <=4 Sensitive    Culture, Blood 2    Specimen: Blood   Result Value Ref Range    Specimen Description . BLOOD     Special Requests L HAND 2ML     Culture NO GROWTH 5 DAYS    Culture, Blood 1    Specimen: Blood   Result Value Ref Range    Specimen Description . BLOOD     Special Requests R HAND 20ML     Culture NO GROWTH 5 DAYS    CBC with Auto Differential   Result Value Ref Range    WBC 20.0 (H) 3.5 - 11.3 k/uL    RBC 4.08 3.95 - 5.11 m/uL    Hemoglobin 11.9 11.9 - 15.1 g/dL    Hematocrit 39.5 36.3 - 47.1 %    MCV 96.8 82.6 - 102.9 fL    MCH 29.2 25.2 - 33.5 pg    MCHC 30.1 28.4 - 34.8 g/dL    RDW 17.7 (H) 11.8 - 14.4 %    Platelets 518 (H) 513 - 453 k/uL    MPV 9.3 8.1 - 13.5 fL    NRBC Automated 0.0 0.0 per 100 WBC    Differential Type NOT REPORTED     WBC Morphology NOT REPORTED     RBC Morphology NOT REPORTED     Platelet Estimate NOT REPORTED     Immature Granulocytes 0 0 %    Seg Neutrophils 88 (H) 36 - 66 %    Lymphocytes 1 (L) 24 - 44 %    Monocytes 11 (H) 1 - 7 %    Eosinophils % 0 (L) 1 - 4 %    Basophils 0 0 - 2 %    Absolute Immature Granulocyte 0.00 0.00 - 0.30 k/uL    Segs Absolute 17.60 (H) 1.8 - 7.7 k/uL    Absolute Lymph # 0.20 (L) 1.0 - 4.8 k/uL    Absolute Mono # 2.20 (H) 0.1 - 0.8 k/uL    Absolute Eos # 0.00 0.0 - 0.4 k/uL    Basophils Absolute 0.00 0.0 - 0.2 k/uL    Morphology ANISOCYTOSIS PRESENT    Comprehensive Metabolic Panel w/ Reflex to MG   Result Value Ref Range    Glucose 150 (H) 70 - 99 mg/dL    BUN 13 6 - 20 mg/dL    CREATININE 0.47 (L) 0.50 - 0.90 mg/dL    Bun/Cre Ratio NOT REPORTED 9 - 20    Calcium 9.0 8.6 - 10.4 mg/dL    Sodium 142 135 - 144 mmol/L    Potassium 3.4 (L) 3.7 - 5.3 mmol/L    Chloride 103 98 - 107 mmol/L    CO2 22 20 - 31 mmol/L    Anion Gap 17 9 - 17 mmol/L    Alkaline Phosphatase 65 35 - 104 U/L    ALT 7 5 - 33 U/L    AST 12 <32 U/L    Total Bilirubin 0.16 (L) 0.3 - 1.2 mg/dL    Total Protein 6.7 6.4 - 8.3 g/dL    Albumin 4.2 3.5 - 5.2 g/dL    Albumin/Globulin Ratio 1.7 1.0 - 2.5    GFR Non-African American >60 >60 mL/min    GFR African American >60 >60 mL/min    GFR Comment          GFR Staging NOT REPORTED    Lipase   Result Value Ref Range    Lipase 13 13 - 60 U/L   Troponin   Result Value Ref Range    Troponin, High Sensitivity 51 (H) 0 - 14 ng/L    Troponin T NOT REPORTED <0.03 ng/mL    Troponin Interp NOT REPORTED    Urinalysis with Microscopic   Result Value Ref Range    Color, UA Yellow Yellow    Turbidity UA Turbid (A) Clear    Glucose, Ur 2+ (A) NEGATIVE    Bilirubin Urine NEGATIVE NEGATIVE    Ketones, Urine NEGATIVE NEGATIVE    Specific Gravity, UA 1.017 1.005 - 1.030    Urine Hgb NEGATIVE NEGATIVE    pH, UA 5.5 5.0 - 8.0    Protein, UA NEGATIVE NEGATIVE    Urobilinogen, Urine Normal Normal    Nitrite, Urine NEGATIVE NEGATIVE    Leukocyte Esterase, Urine NEGATIVE NEGATIVE    -          WBC, UA None 0 - 5 /HPF    RBC, UA 0 TO 2 0 - 4 /HPF    Casts UA NOT REPORTED 0 - 8 /LPF    Crystals, UA NOT REPORTED None /HPF    Epithelial Cells UA 0 TO 2 0 - 5 /HPF    Renal Epithelial, UA NOT REPORTED 0 /HPF    Bacteria, UA NOT REPORTED None    Mucus, UA NOT REPORTED None    Trichomonas, UA NOT REPORTED None    Amorphous, UA NOT REPORTED None    Other Observations UA NOT REPORTED NOT REQ.     Yeast, UA NOT REPORTED None   Lactate, Sepsis   Result Value Ref Range    Lactic Acid, Sepsis NOT REPORTED 0.5 - 1.9 mmol/L    Lactic Acid, Sepsis, Whole Blood 2.8 (H) 0.5 - 1.9 mmol/L   Lactate, Sepsis   Result Value Ref Range    Lactic Acid, Sepsis NOT REPORTED 0.5 - 1.9 mmol/L    Lactic Acid, Sepsis, Whole Blood 1.5 0.5 - 1.9 mmol/L   Lamotrigine Level   Result Value Ref Range    Lamotrigine Lvl 1.3 (L) 3.0 - 15.0 ug/mL   Levetiracetam Level   Result Value Ref Range    Levetiracetam Lvl 22 ug/mL   Troponin   Result Value Ref Range    Troponin, High Sensitivity 57 (HH) 0 - 14 ng/L    Troponin T NOT REPORTED <0.03 ng/mL    Troponin Interp NOT REPORTED    Magnesium   Result Value Ref Range    Magnesium 1.8 1.6 - 2.6 mg/dL   Brain Natriuretic Peptide   Result Value Ref Range    Pro- (H) <300 pg/mL    BNP Interpretation NOT REPORTED    Basic Metabolic Panel w/ Reflex to MG   Result Value Ref Range    Glucose 109 (H) 70 - 99 mg/dL    BUN 8 6 - 20 mg/dL    CREATININE 0.39 (L) 0.50 - 0.90 mg/dL Bun/Cre Ratio NOT REPORTED 9 - 20    Calcium 8.9 8.6 - 10.4 mg/dL    Sodium 137 135 - 144 mmol/L    Potassium 3.6 (L) 3.7 - 5.3 mmol/L    Chloride 103 98 - 107 mmol/L    CO2 22 20 - 31 mmol/L    Anion Gap 12 9 - 17 mmol/L    GFR Non-African American >60 >60 mL/min    GFR African American >60 >60 mL/min    GFR Comment          GFR Staging NOT REPORTED    CBC with Auto Differential   Result Value Ref Range    WBC 14.1 (H) 3.5 - 11.3 k/uL    RBC 3.56 (L) 3.95 - 5.11 m/uL    Hemoglobin 10.5 (L) 11.9 - 15.1 g/dL    Hematocrit 33.7 (L) 36.3 - 47.1 %    MCV 94.7 82.6 - 102.9 fL    MCH 29.5 25.2 - 33.5 pg    MCHC 31.2 28.4 - 34.8 g/dL    RDW 17.4 (H) 11.8 - 14.4 %    Platelets 654 (H) 421 - 453 k/uL    MPV 9.4 8.1 - 13.5 fL    NRBC Automated 0.0 0.0 per 100 WBC    Differential Type NOT REPORTED     WBC Morphology NOT REPORTED     RBC Morphology ANISOCYTOSIS PRESENT     Platelet Estimate NOT REPORTED     Seg Neutrophils 91 (H) 36 - 65 %    Lymphocytes 6 (L) 24 - 43 %    Monocytes 3 3 - 12 %    Eosinophils % 0 (L) 1 - 4 %    Basophils 0 0 - 2 %    Immature Granulocytes 1 (H) 0 %    Segs Absolute 12.77 (H) 1.50 - 8.10 k/uL    Absolute Lymph # 0.78 (L) 1.10 - 3.70 k/uL    Absolute Mono # 0.38 0.10 - 1.20 k/uL    Absolute Eos # <0.03 0.00 - 0.44 k/uL    Basophils Absolute 0.05 0.00 - 0.20 k/uL    Absolute Immature Granulocyte 0.08 0.00 - 0.30 k/uL   Lamotrigine Level   Result Value Ref Range    Lamotrigine Lvl 1.3 (L) 3.0 - 15.0 ug/mL   Levetiracetam Level   Result Value Ref Range    Levetiracetam Lvl 4 ug/mL   DRUG SCREEN MULTI URINE   Result Value Ref Range    Amphetamine Screen, Ur NEGATIVE NEGATIVE    Barbiturate Screen, Ur NEGATIVE NEGATIVE    Benzodiazepine Screen, Urine NEGATIVE NEGATIVE    Cocaine Metabolite, Urine NEGATIVE NEGATIVE    Methadone Screen, Urine NEGATIVE NEGATIVE    Opiates, Urine POSITIVE (A) NEGATIVE    Phencyclidine, Urine NEGATIVE NEGATIVE    Propoxyphene, Urine NOT REPORTED NEGATIVE    Cannabinoid Scrn, Ur NEGATIVE NEGATIVE    Oxycodone Screen, Ur NEGATIVE NEGATIVE    Methamphetamine, Urine NOT REPORTED NEGATIVE    Tricyclic Antidepressants, Urine NOT REPORTED NEGATIVE    MDMA, Urine NOT REPORTED NEGATIVE    Buprenorphine Urine NOT REPORTED NEGATIVE    Test Information       Assay provides medical screening only. The absence of expected drug(s) and/or metabolite(s) may indicate diluted or adulterated urine, limitations of testing or timing of collection.    Troponin   Result Value Ref Range    Troponin, High Sensitivity 17 (H) 0 - 14 ng/L    Troponin T NOT REPORTED <0.03 ng/mL    Troponin Interp NOT REPORTED    Troponin   Result Value Ref Range    Troponin, High Sensitivity 14 0 - 14 ng/L    Troponin T NOT REPORTED <0.03 ng/mL    Troponin Interp NOT REPORTED    Prolactin   Result Value Ref Range    Prolactin 4.52 (L) 4.79 - 23.30 ug/L   Procalcitonin   Result Value Ref Range    Procalcitonin 0.25 (H) <0.09 ng/mL   Troponin   Result Value Ref Range    Troponin, High Sensitivity 16 (H) 0 - 14 ng/L    Troponin T NOT REPORTED <0.03 ng/mL    Troponin Interp NOT REPORTED    CK   Result Value Ref Range    Total  26 - 192 U/L   Myoglobin, Serum   Result Value Ref Range    Myoglobin 62 (H) 25 - 58 ng/mL   Basic Metabolic Panel w/ Reflex to MG   Result Value Ref Range    Glucose 103 (H) 70 - 99 mg/dL    BUN 8 6 - 20 mg/dL    CREATININE 0.35 (L) 0.50 - 0.90 mg/dL    Calcium 9.1 8.6 - 10.4 mg/dL    Sodium 137 135 - 144 mmol/L    Potassium 3.6 (L) 3.7 - 5.3 mmol/L    Chloride 100 98 - 107 mmol/L    CO2 23 20 - 31 mmol/L    Anion Gap 14 9 - 17 mmol/L    GFR Non-African American >60 >60 mL/min    GFR African American >60 >60 mL/min    GFR Comment         CBC with Auto Differential   Result Value Ref Range    WBC 14.2 (H) 3.5 - 11.3 k/uL    RBC 3.88 (L) 3.95 - 5.11 m/uL    Hemoglobin 11.2 (L) 11.9 - 15.1 g/dL    Hematocrit 38.7 36.3 - 47.1 %    MCV 99.7 82.6 - 102.9 fL    MCH 28.9 25.2 - 33.5 pg    MCHC 28.9 28.4 - 34.8 g/dL    RDW 17.3 (H) 11.8 - 14.4 %    Platelets 730 (H) 433 - 453 k/uL    MPV 9.6 8.1 - 13.5 fL    NRBC Automated 0.0 0.0 per 100 WBC    RBC Morphology ANISOCYTOSIS PRESENT     Seg Neutrophils 91 (H) 36 - 65 %    Lymphocytes 7 (L) 24 - 43 %    Monocytes 1 (L) 3 - 12 %    Eosinophils % 0 (L) 1 - 4 %    Basophils 1 0 - 2 %    Immature Granulocytes 1 (H) 0 %    Segs Absolute 13.01 (H) 1.50 - 8.10 k/uL    Absolute Lymph # 0.92 (L) 1.10 - 3.70 k/uL    Absolute Mono # 0.15 0.10 - 1.20 k/uL    Absolute Eos # <0.03 0.00 - 0.44 k/uL    Basophils Absolute 0.07 0.00 - 0.20 k/uL    Absolute Immature Granulocyte 0.07 0.00 - 0.30 k/uL   Ferritin   Result Value Ref Range    Ferritin 202 (H) 13 - 150 ug/L   Iron and TIBC   Result Value Ref Range    Iron 46 37 - 145 ug/dL    TIBC 256 250 - 450 ug/dL    Iron Saturation 18 (L) 20 - 55 %    UIBC 210 112 - 347 ug/dL   Vitamin B12 & Folate   Result Value Ref Range    Vitamin B-12 313 232 - 1245 pg/mL    Folate 14.4 >4.8 ng/mL   Magnesium   Result Value Ref Range    Magnesium 1.7 1.6 - 2.6 mg/dL   Basic Metabolic Panel w/ Reflex to MG   Result Value Ref Range    Glucose 89 70 - 99 mg/dL    BUN 7 6 - 20 mg/dL    CREATININE 0.33 (L) 0.50 - 0.90 mg/dL    Calcium 8.5 (L) 8.6 - 10.4 mg/dL    Sodium 138 135 - 144 mmol/L    Potassium 3.4 (L) 3.7 - 5.3 mmol/L    Chloride 103 98 - 107 mmol/L    CO2 21 20 - 31 mmol/L    Anion Gap 14 9 - 17 mmol/L    GFR Non-African American >60 >60 mL/min    GFR African American >60 >60 mL/min    GFR Comment         CBC with Auto Differential   Result Value Ref Range    WBC 8.8 3.5 - 11.3 k/uL    RBC 3.54 (L) 3.95 - 5.11 m/uL    Hemoglobin 10.1 (L) 11.9 - 15.1 g/dL    Hematocrit 34.2 (L) 36.3 - 47.1 %    MCV 96.6 82.6 - 102.9 fL    MCH 28.5 25.2 - 33.5 pg    MCHC 29.5 28.4 - 34.8 g/dL    RDW 17.0 (H) 11.8 - 14.4 %    Platelets 935 (H) 285 - 453 k/uL    MPV 9.6 8.1 - 13.5 fL    NRBC Automated 0.0 0.0 per 100 WBC    RBC Morphology ANISOCYTOSIS PRESENT     Seg 0. 08 0.00 - 0.20 k/uL    Absolute Immature Granulocyte <0.03 0.00 - 0.30 k/uL    RBC Morphology ANISOCYTOSIS PRESENT    Basic Metabolic Panel w/ Reflex to MG   Result Value Ref Range    Glucose 79 70 - 99 mg/dL    BUN 8 6 - 20 mg/dL    CREATININE 0.46 (L) 0.50 - 0.90 mg/dL    Calcium 8.9 8.6 - 10.4 mg/dL    Sodium 139 135 - 144 mmol/L    Potassium 3.4 (L) 3.7 - 5.3 mmol/L    Chloride 104 98 - 107 mmol/L    CO2 21 20 - 31 mmol/L    Anion Gap 14 9 - 17 mmol/L    GFR Non-African American >60 >60 mL/min    GFR African American >60 >60 mL/min    GFR Comment         CBC with Auto Differential   Result Value Ref Range    WBC 3.5 3.5 - 11.3 k/uL    RBC 3.99 3.95 - 5.11 m/uL    Hemoglobin 11.2 (L) 11.9 - 15.1 g/dL    Hematocrit 37.7 36.3 - 47.1 %    MCV 94.5 82.6 - 102.9 fL    MCH 28.1 25.2 - 33.5 pg    MCHC 29.7 28.4 - 34.8 g/dL    RDW 16.5 (H) 11.8 - 14.4 %    Platelets 492 186 - 136 k/uL    MPV 9.7 8.1 - 13.5 fL    NRBC Automated 0.0 0.0 per 100 WBC    RBC Morphology ANISOCYTOSIS PRESENT     Seg Neutrophils 52 36 - 65 %    Lymphocytes 29 24 - 43 %    Monocytes 14 (H) 3 - 12 %    Eosinophils % 1 1 - 4 %    Basophils 2 0 - 2 %    Immature Granulocytes 1 (H) 0 %    Segs Absolute 1.85 1.50 - 8.10 k/uL    Absolute Lymph # 1.02 (L) 1.10 - 3.70 k/uL    Absolute Mono # 0.51 0.10 - 1.20 k/uL    Absolute Eos # 0.05 0.00 - 0.44 k/uL    Basophils Absolute 0.08 0.00 - 0.20 k/uL    Absolute Immature Granulocyte <0.03 0.00 - 0.30 k/uL   Magnesium   Result Value Ref Range    Magnesium 1.8 1.6 - 2.6 mg/dL   Levetiracetam Level   Result Value Ref Range    Levetiracetam Lvl 15 ug/mL   Basic Metabolic Panel w/ Reflex to MG   Result Value Ref Range    Glucose 83 70 - 99 mg/dL    BUN 7 6 - 20 mg/dL    CREATININE 0.39 (L) 0.50 - 0.90 mg/dL    Calcium 8.9 8.6 - 10.4 mg/dL    Sodium 142 135 - 144 mmol/L    Potassium 4.2 3.7 - 5.3 mmol/L    Chloride 107 98 - 107 mmol/L    CO2 25 20 - 31 mmol/L    Anion Gap 10 9 - 17 mmol/L    GFR Non-African American >60 >60 mL/min    GFR African American >60 >60 mL/min    GFR Comment         CBC with Auto Differential   Result Value Ref Range    WBC 5.0 3.5 - 11.3 k/uL    RBC 3.72 (L) 3.95 - 5.11 m/uL    Hemoglobin 10.9 (L) 11.9 - 15.1 g/dL    Hematocrit 35.7 (L) 36.3 - 47.1 %    MCV 96.0 82.6 - 102.9 fL    MCH 29.3 25.2 - 33.5 pg    MCHC 30.5 28.4 - 34.8 g/dL    RDW 17.3 (H) 11.8 - 14.4 %    Platelets 782 184 - 302 k/uL    MPV 9.7 8.1 - 13.5 fL    NRBC Automated 0.0 0.0 per 100 WBC    Seg Neutrophils 64 36 - 65 %    Lymphocytes 20 (L) 24 - 43 %    Monocytes 10 3 - 12 %    Eosinophils % 3 1 - 4 %    Basophils 2 0 - 2 %    Immature Granulocytes 1 (H) 0 %    Segs Absolute 3.25 1.50 - 8.10 k/uL    Absolute Lymph # 1.02 (L) 1.10 - 3.70 k/uL    Absolute Mono # 0.48 0.10 - 1.20 k/uL    Absolute Eos # 0.13 0.00 - 0.44 k/uL    Basophils Absolute 0.08 0.00 - 0.20 k/uL    Absolute Immature Granulocyte 0.03 0.00 - 0.30 k/uL    RBC Morphology ANISOCYTOSIS PRESENT    Basic Metabolic Panel w/ Reflex to MG   Result Value Ref Range    Glucose 85 70 - 99 mg/dL    BUN 9 6 - 20 mg/dL    CREATININE 0.38 (L) 0.50 - 0.90 mg/dL    Calcium 9.2 8.6 - 10.4 mg/dL    Sodium 142 135 - 144 mmol/L    Potassium 4.0 3.7 - 5.3 mmol/L    Chloride 105 98 - 107 mmol/L    CO2 25 20 - 31 mmol/L    Anion Gap 12 9 - 17 mmol/L    GFR Non-African American >60 >60 mL/min    GFR African American >60 >60 mL/min    GFR Comment         CBC with Auto Differential   Result Value Ref Range    WBC 8.6 3.5 - 11.3 k/uL    RBC 3.82 (L) 3.95 - 5.11 m/uL    Hemoglobin 11.1 (L) 11.9 - 15.1 g/dL    Hematocrit 36.8 36.3 - 47.1 %    MCV 96.3 82.6 - 102.9 fL    MCH 29.1 25.2 - 33.5 pg    MCHC 30.2 28.4 - 34.8 g/dL    RDW 17.5 (H) 11.8 - 14.4 %    Platelets 850 969 - 196 k/uL    MPV 9.5 8.1 - 13.5 fL    NRBC Automated 0.0 0.0 per 100 WBC    RBC Morphology ANISOCYTOSIS PRESENT     Seg Neutrophils 73 (H) 36 - 65 %    Lymphocytes 15 (L) 24 - 43 %    Monocytes 7 3 - 12 % Eosinophils % 4 1 - 4 %    Basophils 1 0 - 2 %    Immature Granulocytes 1 (H) 0 %    Segs Absolute 6.24 1.50 - 8.10 k/uL    Absolute Lymph # 1.25 1.10 - 3.70 k/uL    Absolute Mono # 0.60 0.10 - 1.20 k/uL    Absolute Eos # 0.37 0.00 - 0.44 k/uL    Basophils Absolute 0.06 0.00 - 0.20 k/uL    Absolute Immature Granulocyte 0.05 0.00 - 0.30 k/uL   POC Glucose Fingerstick   Result Value Ref Range    POC Glucose 173 (H) 65 - 105 mg/dL   POC Glucose Fingerstick   Result Value Ref Range    POC Glucose 79 65 - 105 mg/dL   POC Glucose Fingerstick   Result Value Ref Range    POC Glucose 71 65 - 105 mg/dL   POC Glucose Fingerstick   Result Value Ref Range    POC Glucose 74 65 - 105 mg/dL   POC Glucose Fingerstick   Result Value Ref Range    POC Glucose 96 65 - 105 mg/dL   POC Glucose Fingerstick   Result Value Ref Range    POC Glucose 76 65 - 105 mg/dL   POC Glucose Fingerstick   Result Value Ref Range    POC Glucose 132 (H) 65 - 105 mg/dL   POC Glucose Fingerstick   Result Value Ref Range    POC Glucose 121 (H) 65 - 105 mg/dL   POC Glucose Fingerstick   Result Value Ref Range    POC Glucose 92 65 - 105 mg/dL   POC Glucose Fingerstick   Result Value Ref Range    POC Glucose 140 (H) 65 - 105 mg/dL   POC Glucose Fingerstick   Result Value Ref Range    POC Glucose 90 65 - 105 mg/dL   POC Glucose Fingerstick   Result Value Ref Range    POC Glucose 172 (H) 65 - 105 mg/dL   EKG 12 Lead   Result Value Ref Range    Ventricular Rate 90 BPM    Atrial Rate 90 BPM    P-R Interval 162 ms    QRS Duration 90 ms    Q-T Interval 386 ms    QTc Calculation (Bazett) 472 ms    P Axis 49 degrees    R Axis 87 degrees    T Axis 55 degrees   EKG 12 Lead   Result Value Ref Range    Ventricular Rate 99 BPM    Atrial Rate 99 BPM    P-R Interval 172 ms    QRS Duration 92 ms    Q-T Interval 364 ms    QTc Calculation (Bazett) 467 ms    P Axis 45 degrees    R Axis -5 degrees    T Axis 36 degrees   EKG 12 Lead   Result Value Ref Range    Ventricular Rate 64 BPM Atrial Rate 64 BPM    P-R Interval 152 ms    QRS Duration 88 ms    Q-T Interval 454 ms    QTc Calculation (Bazett) 468 ms    P Axis 42 degrees    R Axis 28 degrees    T Axis 46 degrees         IMAGING DATA:    XR ABDOMEN (KUB) (SINGLE AP VIEW)    Result Date: 2/20/2022  EXAMINATION: ONE SUPINE XRAY VIEW(S) OF THE ABDOMEN 2/20/2022 11:18 pm COMPARISON: January 4, 2021 HISTORY: ORDERING SYSTEM PROVIDED HISTORY: rule out ileus TECHNOLOGIST PROVIDED HISTORY: rule out ileus Reason for Exam: supine,nausea,vomiting FINDINGS: 37 mm calcified lesion right upper quadrant may represent a gallstone. New metallic coils noted in the left upper quadrant. A catheter is noted extending from the left upper quadrant to the pelvis and appears unchanged. Gas-filled loops of small large bowel. Increased stool. Osseous structures normal.  Intravenous contrast is noted in the bladder. Ileus. Probable gallstone. Catheter left abdomen and pelvis requires clinical correlation. CT Head WO Contrast    Result Date: 2/20/2022  EXAMINATION: CT OF THE HEAD WITHOUT CONTRAST  2/20/2022 1:44 pm TECHNIQUE: CT of the head was performed without the administration of intravenous contrast. Dose modulation, iterative reconstruction, and/or weight based adjustment of the mA/kV was utilized to reduce the radiation dose to as low as reasonably achievable. COMPARISON: 06/18/2021, MRI 06/18/2021 HISTORY: ORDERING SYSTEM PROVIDED HISTORY:  Eavl for mets TECHNOLOGIST PROVIDED HISTORY: Eavl for mets Decision Support Exception - unselect if not a suspected or confirmed emergency medical condition->Emergency Medical Condition (MA) Reason for Exam:  Eval for mets FINDINGS: BRAIN/VENTRICLES: There is no acute intracranial hemorrhage, mass effect or midline shift. No abnormal extra-axial fluid collection. The gray-white differentiation is maintained without evidence of an acute infarct. There is no evidence of hydrocephalus.  Subtle low attenuation in the deep white matter which is nonspecific likely due to chronic small vessel ischemia. Overall appearance is similar to the prior study. CT with contrast or MRI would be more sensitive to evaluate for metastatic disease. ORBITS: The visualized portion of the orbits demonstrate no acute abnormality. SINUSES: The visualized paranasal sinuses demonstrate no acute abnormality. Tiny retention cyst versus focal mucosal thickening left maxillary sinus. Minimal fluid/opacification left mastoid air cells. SOFT TISSUES/SKULL:  No acute abnormality of the visualized skull or soft tissues. Similar small right temporal flat skin lesion. No acute intracranial abnormality. XR CHEST PORTABLE    Result Date: 2/20/2022  EXAMINATION: ONE XRAY VIEW OF THE CHEST 2/20/2022 11:51 am COMPARISON: 06/22/2021, 06/21/2021 HISTORY: ORDERING SYSTEM PROVIDED HISTORY: eval for pmn TECHNOLOGIST PROVIDED HISTORY: eval for pmn FINDINGS: The cardiomediastinal silhouette is unchanged in appearance. Right internal jugular port in place. Generalized interstitial prominence again noted. There is no consolidation, pneumothorax, or evidence of edema. No effusion is appreciated. The osseous structures are unchanged in appearance. Vascular coil pack in the left upper quadrant. Chronic findings in the chest without acute airspace disease identified. CT CHEST PULMONARY EMBOLISM W CONTRAST    Result Date: 2/20/2022  EXAMINATION: CTA OF THE CHEST 2/20/2022 1:48 pm TECHNIQUE: CTA of the chest was performed after the administration of intravenous contrast.  Multiplanar reformatted images are provided for review. MIP images are provided for review. Dose modulation, iterative reconstruction, and/or weight based adjustment of the mA/kV was utilized to reduce the radiation dose to as low as reasonably achievable. COMPARISON: CT chest 03/09/2021. CT abdomen and pelvis 10/29/2021.  HISTORY: ORDERING SYSTEM PROVIDED HISTORY: eval for pe TECHNOLOGIST PROVIDED HISTORY: eval for pe Decision Support Exception - unselect if not a suspected or confirmed emergency medical condition->Emergency Medical Condition (MA) Reason for Exam: eval for pe FINDINGS: Pulmonary Arteries: Pulmonary arteries are adequately opacified for evaluation. No evidence of intraluminal filling defect to suggest pulmonary embolism. Main pulmonary artery is normal in caliber. Mediastinum: Mildly enlarged confluent hilar lymph nodes and lymphatic tissue in the AP window and subcarinal regions again demonstrated. Small pericardial effusion, similar in appearance. There is no acute abnormality of the thoracic aorta. Right internal jugular port in place. Lungs/pleura: Respiratory motion artifact noted. Mild septal thickening. Persistent nodular opacity in the left lower lobe on axial image 63 measuring up to 2.6 cm. Scattered ground-glass nodules are again demonstrated bilaterally. The amount of left pleural fluid has diminished in the interval with trace amount remaining. Soft tissue calcification near the supra [place] IVC again demonstrated. Upper Abdomen: Coarse calcification near the medial and inferior aspect of the spleen partially visualized, as seen previously. Dense metallic artifact corresponding to metallic coil pack near the spleen again demonstrated. Cholelithiasis. Soft Tissues/Bones: Blastic metastatic deposits predominantly in the lower thoracic and upper lumbar spine again demonstrated. 1.  No evidence for acute pulmonary embolism. 2.  Bilateral pulmonary nodules, thoracic lymphadenopathy, soft tissue calcifications in the chest and upper abdomen and sclerotic bone lesions again demonstrated, as described previously, which may be related to the patient's history of malignancy. 3.  Small pericardial effusion. Trace left pleural effusion. 4.  Mild septal thickening suggestive of interstitial edema.      IR PLACE NG TUBE BY DR Cortez Purdy    Result Date: 2/21/2022  PROCEDURE: XR PLACE NASOGASTRIC TUBE PHYS 2/21/2022 HISTORY: ORDERING SYSTEM PROVIDED HISTORY: ileus TECHNOLOGIST PROVIDED HISTORY: ileus Ileus CONTRAST: None SEDATION: None FLUOROSCOPY DOSE AND TYPE OR TIME AND EXPOSURES: Fluoro time 0.7 minutes  cGy cm 2 DESCRIPTION OF PROCEDURE: This procedure was performed by Yair DAVID under indirect supervision of . Vilas protocol was observed. An attempt was made to pass a 12 [place] sump through right and left nostrils without success due to stricture in the nasal cavity. Under fluoroscopic guidance, through the left nostril, a 12 [place] sump nasogastric tube was negotiated into the stomach. With catheter manipulation, the tip of the catheter was manipulated into the distal stomach/proximal duodenum. Air bolus administration confirmed satisfactory tip position. The catheter was secured appropriately at 74 cm. Estimated blood loss was 0 mL. The patient tolerated the procedure well. Successful placement of nasogastric tube. Okay to use.          IMPRESSION:   Primary Problem  Dehydration    Active Hospital Problems    Diagnosis Date Noted    Acute metabolic encephalopathy [M58.27]     Confusion [R41.0]     Urinary tract infection without hematuria [N39.0]     Seizure disorder ([address] [G40.909]     Breakthrough seizure ([address] [G40.919]     Lactic acidosis [E87.2] 02/21/2022    Ileus ([address] [K56.7] 02/21/2022    Elevated troponin [R77.8] 02/21/2022    Pericardial effusion [I31.3] 02/21/2022    Hypokalemia [E87.6] 02/21/2022    Acute respiratory failure with hypoxia ([address] [J96.01] 02/21/2022    Dehydration [E86.0] 02/20/2022    AMS (altered mental status) [R41.82] 02/20/2022    Ovarian cancer ([address] [C56.9] 05/29/2021    Chronic deep vein thrombosis (DVT) of femoral vein of left lower extremity ([address] [I82.512] 03/11/2021    Fatigue [R53.83]     Cancer, metastatic to bone ([address] [C79.51] 01/28/2021    Encephalopathy acute [G93.40]     Type 2 diabetes mellitus without complication, without long-term current use of insulin ([facility] [E11.9]     Seizure ([address] [R56.9] 10/21/2020       Recurrent ovarian cancer chemotherapy using gemcitabine with stable CA-125  Abdominal pain nausea vomiting  Ileus per KUB  Seizure disorder  DVT of femoral vein on anticoagulation using Eliquis      RECOMMENDATIONS:  1. I personally reviewed results of lab work-up imaging studies and other relevant clinical data. 2. Mentation is much improved  3. Discussed results of CT scan which show slight increase in size of lymph node concerning for progression. This will be further addressed by patient's primary oncologist.  Reviewed goals and expectations. Patient wishes to pursue further treatment  4. Continue symptomatic supportive care  5. No plans for chemotherapy in house  6. Okay to discharge from medical oncology standpoint with outpatient follow-up with Dr. Ashleigh Felton              Discussed with patient and Nurse. Thank you for asking us to see this patient. Martha Newberry MD          This note is created with the assistance of a speech recognition program.  While intending to generate a document that actually reflects the content of the visit, the document can still have some errors including those of syntax and sound a like substitutions which may escape proof reading. It such instances, actual meaning can be extrapolated by contextual diversion.

## 2022-02-27 NOTE — PROGRESS NOTES
Bedside swallow eval performed at bedside. Pt did well without any s/s of difficulty swallowing or aspirating.   Will advance pureed diet to regular diet as Instructed by Dr. Cary Hearn with the med team. Will continue to monitor

## 2022-02-27 NOTE — RT PROTOCOL NOTE
The Respiratory Therapy Department completed the Respiratory Care evaluation. No therapy is indicated at this time. The reason therapy is not indicated is due to the following:     [x] Patient does not meet indications for therapy. [] Patient has returned to their home regimen.    [] Patient frequency has changed to prn, nursing to assess and call if needed  . Respiratory Care will not see this patient further unless otherwise requested. Please call the respiratory care charge therapist with questions or concerns at extension [phone].  Thank you for using the Respiratory Therapy Protocol Program.    CHRISTOPHER ARAIZA RCP   12:44 PM

## 2022-02-27 NOTE — PROGRESS NOTES
[facility]   [address] [place] [address]    Progress Note             Date:   2/27/2022  Patient name:  Patricia Sinha  Date of admission:  2/20/2022  9:53 AM  MRN:   [id number]  Account:  [de-identified]  YOB: 1965  PCP:    Abby Humphries MD  Room:   [address] [place] [address]  Code Status:    Full Code    Chief Complaint:     Chief Complaint   Patient presents with    Altered Mental Status    Nausea & Vomiting       Interval hx: The patient was seen and examined at bedside. Is vitally stable, alert and oriented x 3. No acute events overnight. Patient states that she did not sleep well. Patient did not receive Seroquel 50 mg yesterday night. Seroquel reordered. Can continue upon discharge. Patient has not had any further seizures. Brief History of Present Illness: The patient is a 60 y.o. female with significant past medical history of ovarian cancer with metastasis s/p chemo and radiation therapy, seizure disorder on Lamictal and Keppra who presents with nausea, vomiting and AMS. 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 She had surgical debulking and she had systemic treatment with Taxol and carboplatin for 6 cycles. Patient was in remission for about 2 years. She had a relapse in 2007 and treated with topotecan with good results. Patient relapsed again in 2008 and was treated with Taxol carboplatin. After 3 cycles of systemic chemotherapy she had problems with carboplatin so she finished 3 more cycles with Taxol. She did well again for 2 to 3 years until she had a relapse in 2012 and she had intraperitoneal chemotherapy treatment with Taxol. Patient was last seen by her oncologist in 2014 at which time she had relatively stable disease with normal tumor marker and no significant abnormal images. Patient moved to [place] after that and she was lost for oncology follow-ups.   Patient states that she came back to [place], [place] 3 years ago and has been following with an oncologist since. Patient states her last chemo therapy was last week on February 18th, 2022. Re imaging confirmed metastatic relapse. Biopsy confirmed ovarian cancer recurrence. Scan showed extensive intraabdominal and splenic involvement and lung mets. CT brain was unremarkable on 2/20/22. Past Medical History:     Past Medical History:   Diagnosis Date    Anemia     Bleeding 10/2020    intra-abdominal bleeding -due to splenic mass with GI infiltration. Status post embolization    Cervical cancer ([address]     Depression     Diabetes mellitus ([address]     GERD (gastroesophageal reflux disease)     Hx of blood clots     Hypertension     Metast[facility] cancer [facility] 10/2020    extensive intraabdominal and splenic involvement and lung mets.  Ovarian cancer ([facility]     low grade serous ovarian carcinoma    Post chemo evaluation     2007: Chemo via med onc (Dr. Ruben Dsouza), 2008: Charlie Denny due to rising CA-125, 2013: intraperitoneal chemo,12/2015: Ca125 - 25     Splenic lesion         Past Surgical History:     Past Surgical History:   Procedure Laterality Date    ABSCESS DRAINAGE  2013    Franca rectal    ANUS SURGERY      ANAL FISSURECTOMY    CARDIAC CATHETERIZATION      COLECTOMY  03/2013    ex lap, tumor debulking, transverse colectomy w reanastamosis, subgastric omentectomy, intraperitoneal port placement    HYSTERECTOMY, TOTAL ABDOMINAL      IR EMBOLIZATION HEMORRHAGE  10/05/2020    intra-abdominal bleeding -due to splenic mass with GI infiltration. Status post embolization [employer] interlock coils x7. mri condtional 3t ok, safe immediately post implant.     IR PORT PLACEMENT EQUAL OR GREATER THAN 5 YEARS  08/24/2020    IR PORT PLACEMENT EQUAL OR GREATER THAN 5 YEARS 8/24/2020 Nicolasa Oden MD [facility]    PORT SURGERY      IP Port    TONSILLECTOMY          Medications Prior to Admission:     Prior to Admission medications    Medication Sig Start Date End Date Taking? Authorizing Provider   LORazepam (ATIVAN) 1 MG tablet Take 1 tablet by mouth every 8 hours as needed for Anxiety for up to 30 doses. 2/18/22 4/2/22  Kaila George MD   polyethylene glycol (MIRALAX) 17 g packet Take 17 g by mouth daily as needed for Constipation 2/18/22 3/20/22  Kaila George MD   lamoTRIgine (LAMICTAL) 25 MG tablet TAKE 3 TABS IN IN THE MORNING AND 3 TABS IN IN THE EVENING 2/9/22   Jr Lantigua MD   levETIRAcetam (KEPPRA) 750 MG tablet Take 2 tablets by mouth 2 times daily 2/9/22   Yimi Maurice MD   diazePAM 5 MG/0.1ML LIQD 1 puff by Nasal route as needed (seizures)  Patient not taking: Reported on 2/18/2022 2/9/22   Yimi Maurice MD   pantoprazole (PROTONIX) 40 MG tablet Take 1 tablet by mouth daily 1/3/22   Kaila George MD   ondansetron (ZOFRAN-ODT) 4 MG disintegrating tablet Take 1 tablet by mouth every 8 hours as needed for Nausea or Vomiting 12/3/21   Kaila George MD   naloxegol (MOVANTIK) 12.5 MG TABS tablet Take 1 tablet by mouth every morning  Patient not taking: Reported on 2/18/2022 11/15/21   Kaila George MD   ferrous sulfate (IRON 325) 325 (65 Fe) MG tablet Take 1 tablet by mouth daily (with breakfast) 10/31/21   Yessenia Ramos MD   morphine (MSIR) 30 MG tablet Take 30 mg by mouth 2 times daily as needed for Pain. Historical Provider, MD   sertraline (ZOLOFT) 100 MG tablet TAKE 1 TABLET BY MOUTH DAILY 10/4/21 2/18/22  Kaila George MD   ELIQUIS 5 MG TABS tablet Take 5 mg by mouth 2 times daily  5/26/21   Historical Provider, MD        Allergies:     Ceftriaxone    Social History:     Tobacco:    reports that she has been smoking cigarettes. She has been smoking about 1.00 pack per day. She uses smokeless tobacco.  Alcohol:      reports previous alcohol use. Drug Use:  reports no history of drug use.     Family History:     Family History   Problem Relation Age of Onset    Alcohol Abuse Mother     Cirrhosis Mother        Review of Systems:     Review of Systems   Constitutional: Negative for activity change, appetite change, chills and fever. HENT: Negative for congestion. Respiratory: Negative for cough, chest tightness, shortness of breath and wheezing. Cardiovascular: Negative for chest pain and leg swelling. Gastrointestinal: Negative for abdominal distention, abdominal pain, diarrhea, nausea and vomiting. Genitourinary: Negative for dysuria and flank pain. Skin: Negative for rash. Neurological: Negative for dizziness, weakness, numbness and headaches. Psychiatric/Behavioral: Positive for sleep disturbance. Negative for agitation, behavioral problems and confusion.        Physical Exam:   /62   Pulse 66   Temp 98.6 °F (37 °C) (Oral)   Resp 16   Ht 5' 5\" (1.651 m)   Wt 163 lb 12.8 oz (74.3 kg)   SpO2 98%   BMI 27.26 kg/m²   Temp (24hrs), Av.5 °F (36.9 °C), Min:98.2 °F (36.8 °C), Max:98.6 °F (37 °C)    Recent Labs     22  0622 22  1208 22  0742   POCGLU 92 140* 90 172*       Intake/Output Summary (Last 24 hours) at 2022 1348  Last data filed at 2022 0741  Gross per 24 hour   Intake 480 ml   Output 2050 ml   Net -1570 ml         Neurologic Exam     GENERAL  Appears comfortable and in no distress   HEENT  NC/ AT   HEART  S1 and S2 heard; palpation of pulses: radial pulse    NECK  Supple and no bruits heard   MENTAL STATUS:  Alert, oriented, intact memory, no confusion, normal speech, normal language, no hallucination or delusion   CRANIAL NERVES: II     -      Visual fields intact to confrontation  III,IV,VI -  PERR, EOMs full, no ptosis  V     -     Normal facial sensation   VII    -     Normal facial symmetry  VIII   -     Intact hearing   IX,X -     Symmetrical palate  XI    -     Symmetrical shoulder shrug  XII   -     Midline tongue, no atrophy    MOTOR FUNCTION: RUE: Significant for good strength of grade 5/5 in proximal and distal muscle groups   LUE: Significant for good strength of grade 5/5 in proximal and distal muscle groups   RLE: Significant for good strength of grade 5/5 in proximal and distal muscle groups   LLE: Significant for good strength of grade 5/5 in proximal and distal muscle groups      Normal bulk, normal tone and no involuntary movements, no tremor   SENSORY FUNCTION:  Normal touch, normal pinprick, normal vibration, normal proprioception   CEREBELLAR FUNCTION:  Intact fine motor control over upper limbs and lower limbs   REFLEX FUNCTION:  Symmetric in upper and lower extremities, no Babinski sign   STATION and GAIT  Normal gait and tandem station, normal tip toes and heel walking       Investigations:      Laboratory Testing:  Recent Results (from the past 24 hour(s))   POC Glucose Fingerstick    Collection Time: 02/26/22  8:31 PM   Result Value Ref Range    POC Glucose 90 65 - 105 mg/dL   Basic Metabolic Panel w/ Reflex to MG    Collection Time: 02/27/22  6:11 AM   Result Value Ref Range    Glucose 85 70 - 99 mg/dL    BUN 9 6 - 20 mg/dL    CREATININE 0.38 (L) 0.50 - 0.90 mg/dL    Calcium 9.2 8.6 - 10.4 mg/dL    Sodium 142 135 - 144 mmol/L    Potassium 4.0 3.7 - 5.3 mmol/L    Chloride 105 98 - 107 mmol/L    CO2 25 20 - 31 mmol/L    Anion Gap 12 9 - 17 mmol/L    GFR Non-African American >60 >60 mL/min    GFR African American >60 >60 mL/min    GFR Comment         CBC with Auto Differential    Collection Time: 02/27/22  6:11 AM   Result Value Ref Range    WBC 8.6 3.5 - 11.3 k/uL    RBC 3.82 (L) 3.95 - 5.11 m/uL    Hemoglobin 11.1 (L) 11.9 - 15.1 g/dL    Hematocrit 36.8 36.3 - 47.1 %    MCV 96.3 82.6 - 102.9 fL    MCH 29.1 25.2 - 33.5 pg    MCHC 30.2 28.4 - 34.8 g/dL    RDW 17.5 (H) 11.8 - 14.4 %    Platelets 251 473 - 209 k/uL    MPV 9.5 8.1 - 13.5 fL    NRBC Automated 0.0 0.0 per 100 WBC    RBC Morphology ANISOCYTOSIS PRESENT     Seg Neutrophils 73 (H) 36 - 65 %    Lymphocytes 15 (L) 24 - 43 %    Monocytes 7 3 - 12 %    Eosinophils % 4 1 - 4 %    Basophils 1 0 - 2 %    Immature Granulocytes 1 (H) 0 %    Segs Absolute 6.24 1.50 - 8.10 k/uL    Absolute Lymph # 1.25 1.10 - 3.70 k/uL    Absolute Mono # 0.60 0.10 - 1.20 k/uL    Absolute Eos # 0.37 0.00 - 0.44 k/uL    Basophils Absolute 0.06 0.00 - 0.20 k/uL    Absolute Immature Granulocyte 0.05 0.00 - 0.30 k/uL   POC Glucose Fingerstick    Collection Time: 02/27/22  7:42 AM   Result Value Ref Range    POC Glucose 172 (H) 65 - 105 mg/dL     Recent Labs     02/27/22  0611   WBC 8.6   RBC 3.82*   HGB 11.1*   HCT 36.8   MCV 96.3   MCH 29.1   MCHC 30.2   RDW 17.5*      MPV 9.5     Recent Labs     02/27/22  0611      K 4.0      CO2 25   BUN 9   CREATININE 0.38*   GLUCOSE 85   CALCIUM 9.2     Hemoglobin A1C   Date Value Ref Range Status   03/10/2021 6.0 4.0 - 6.0 % Final       Assessment :      Primary Problem  Dehydration    Active Hospital Problems    Diagnosis Date Noted    Acute metabolic encephalopathy [I18.43]     Confusion [R41.0]     Urinary tract infection without hematuria [N39.0]     Seizure disorder ([facility] [G40.909]     Breakthrough seizure ([facility] [G40.919]     Lactic acidosis [E87.2] 02/21/2022    Ileus ([facility] [K56.7] 02/21/2022    Elevated troponin [R77.8] 02/21/2022    Pericardial effusion [I31.3] 02/21/2022    Hypokalemia [E87.6] 02/21/2022    Acute respiratory failure with hypoxia (HCC) [J96.01] 02/21/2022    Dehydration [E86.0] 02/20/2022    AMS (altered mental status) [R41.82] 02/20/2022    Ovarian cancer ([facility] [C56.9] 05/29/2021    Chronic deep vein thrombosis (DVT) of femoral vein of left lower extremity (HCC) [I82.512] 03/11/2021    Fatigue [R53.83]     Cancer, metastatic to bone ([address] [C79.51] 01/28/2021    Encephalopathy acute [G93.40]     Type 2 diabetes mellitus without complication, without long-term current use of insulin ([address] [E11.9]     Seizure ([address] [R56.9] 10/21/2020       Patient is a 60 y.o. Non- / non  female  with a PMH of ovarian cancer with metastasis post chemotherapy/radiotherapy couple of days ago presented with N/V, chills, fatigue and altered mental status. Patient's urine culture was positive for E coli, currently on Unasyn. Patient admitted to inpatient status for further management. Patient had seizure O/N and was loaded with 2mg Ativan and 1g Keppra. Patient agitated with Keppra, dc to 1000 bid and inc Lamictal 125 mg bid     Impression:   1. Seizures  2. Ovarian CA        Plan:       Cont Lamictal to 125 mg bid, Keppra 1 g bid on discharge   Keppra level 15 on 2/25   LTME: left occipital temporal sharps, LPDs  MRI brain w/wo- no acute change   Cont Seroquel 50 mg nightly    Neurology signing off, please feel free to contact us with any further questions. Follow up in clinic 4-6 weeks       Follow-up further recommendations after discussing the case with attending  The plan was discussed with the patient, patient's family and the medical staff. Consultations:   IP CONSULT TO ONCOLOGY  IP CONSULT TO INTERNAL MEDICINE  IP CONSULT TO CASE MANAGEMENT  IP CONSULT TO NEUROLOGY  IP CONSULT TO PSYCHIATRY    Patient is admitted as inpatient status because of co-morbidities listed above, severity of signs and symptoms as outlined, requirement for current medical therapies and most importantly because of direct risk to patient if care not provided in a hospital setting.     Inder Deras MD  2/27/2022  1:48 PM    Copy sent to Dr. Mel Oakes MD

## 2022-02-28 VITALS
HEIGHT: 65 IN | SYSTOLIC BLOOD PRESSURE: 142 MMHG | OXYGEN SATURATION: 94 % | RESPIRATION RATE: 16 BRPM | TEMPERATURE: 97.4 F | HEART RATE: 78 BPM | WEIGHT: 163.8 LBS | DIASTOLIC BLOOD PRESSURE: 64 MMHG | BODY MASS INDEX: 27.29 KG/M2

## 2022-02-28 LAB
ABSOLUTE EOS #: 0.4 K/UL (ref 0–0.44)
ABSOLUTE IMMATURE GRANULOCYTE: 0.08 K/UL (ref 0–0.3)
ABSOLUTE LYMPH #: 1.22 K/UL (ref 1.1–3.7)
ABSOLUTE MONO #: 0.9 K/UL (ref 0.1–1.2)
ANION GAP SERPL CALCULATED.3IONS-SCNC: 11 MMOL/L (ref 9–17)
BASOPHILS # BLD: 1 % (ref 0–2)
BASOPHILS ABSOLUTE: 0.06 K/UL (ref 0–0.2)
BUN BLDV-MCNC: 17 MG/DL (ref 6–20)
CALCIUM SERPL-MCNC: 8.9 MG/DL (ref 8.6–10.4)
CHLORIDE BLD-SCNC: 106 MMOL/L (ref 98–107)
CO2: 24 MMOL/L (ref 20–31)
CREAT SERPL-MCNC: 0.5 MG/DL (ref 0.5–0.9)
EOSINOPHILS RELATIVE PERCENT: 5 % (ref 1–4)
GFR AFRICAN AMERICAN: >60 ML/MIN
GFR NON-AFRICAN AMERICAN: >60 ML/MIN
GFR SERPL CREATININE-BSD FRML MDRD: ABNORMAL ML/MIN/{1.73_M2}
GLUCOSE BLD-MCNC: 107 MG/DL (ref 65–105)
GLUCOSE BLD-MCNC: 110 MG/DL (ref 70–99)
GLUCOSE BLD-MCNC: 97 MG/DL (ref 65–105)
GLUCOSE BLD-MCNC: 98 MG/DL (ref 65–105)
HCT VFR BLD CALC: 36.3 % (ref 36.3–47.1)
HEMOGLOBIN: 10.7 G/DL (ref 11.9–15.1)
IMMATURE GRANULOCYTES: 1 %
LYMPHOCYTES # BLD: 14 % (ref 24–43)
MCH RBC QN AUTO: 29.2 PG (ref 25.2–33.5)
MCHC RBC AUTO-ENTMCNC: 29.5 G/DL (ref 28.4–34.8)
MCV RBC AUTO: 98.9 FL (ref 82.6–102.9)
MONOCYTES # BLD: 10 % (ref 3–12)
NRBC AUTOMATED: 0 PER 100 WBC
PDW BLD-RTO: 17.7 % (ref 11.8–14.4)
PLATELET # BLD: 281 K/UL (ref 138–453)
PMV BLD AUTO: 9.7 FL (ref 8.1–13.5)
POTASSIUM SERPL-SCNC: 4.5 MMOL/L (ref 3.7–5.3)
RBC # BLD: 3.67 M/UL (ref 3.95–5.11)
RBC # BLD: ABNORMAL 10*6/UL
SEG NEUTROPHILS: 69 % (ref 36–65)
SEGMENTED NEUTROPHILS ABSOLUTE COUNT: 6.19 K/UL (ref 1.5–8.1)
SODIUM BLD-SCNC: 141 MMOL/L (ref 135–144)
WBC # BLD: 8.9 K/UL (ref 3.5–11.3)

## 2022-02-28 PROCEDURE — 99238 HOSP IP/OBS DSCHRG MGMT 30/<: CPT | Performed by: INTERNAL MEDICINE

## 2022-02-28 PROCEDURE — 6370000000 HC RX 637 (ALT 250 FOR IP)

## 2022-02-28 PROCEDURE — 6370000000 HC RX 637 (ALT 250 FOR IP): Performed by: STUDENT IN AN ORGANIZED HEALTH CARE EDUCATION/TRAINING PROGRAM

## 2022-02-28 PROCEDURE — 6360000002 HC RX W HCPCS

## 2022-02-28 PROCEDURE — 6360000002 HC RX W HCPCS: Performed by: STUDENT IN AN ORGANIZED HEALTH CARE EDUCATION/TRAINING PROGRAM

## 2022-02-28 PROCEDURE — C9113 INJ PANTOPRAZOLE SODIUM, VIA: HCPCS | Performed by: STUDENT IN AN ORGANIZED HEALTH CARE EDUCATION/TRAINING PROGRAM

## 2022-02-28 PROCEDURE — 82947 ASSAY GLUCOSE BLOOD QUANT: CPT

## 2022-02-28 PROCEDURE — 36415 COLL VENOUS BLD VENIPUNCTURE: CPT

## 2022-02-28 PROCEDURE — 90792 PSYCH DIAG EVAL W/MED SRVCS: CPT | Performed by: PSYCHIATRY & NEUROLOGY

## 2022-02-28 PROCEDURE — 85025 COMPLETE CBC W/AUTO DIFF WBC: CPT

## 2022-02-28 PROCEDURE — 80048 BASIC METABOLIC PNL TOTAL CA: CPT

## 2022-02-28 PROCEDURE — 2580000003 HC RX 258: Performed by: STUDENT IN AN ORGANIZED HEALTH CARE EDUCATION/TRAINING PROGRAM

## 2022-02-28 RX ORDER — LAMOTRIGINE 25 MG/1
TABLET ORAL
Qty: 30 TABLET | Refills: 3 | Status: SHIPPED | OUTPATIENT
Start: 2022-02-28 | End: 2022-02-28

## 2022-02-28 RX ORDER — OXYCODONE HYDROCHLORIDE AND ACETAMINOPHEN 5; 325 MG/1; MG/1
1 TABLET ORAL EVERY 8 HOURS PRN
Qty: 90 TABLET | Refills: 0 | Status: SHIPPED | OUTPATIENT
Start: 2022-02-28 | End: 2022-03-16

## 2022-02-28 RX ORDER — QUETIAPINE FUMARATE 25 MG/1
25 TABLET, FILM COATED ORAL NIGHTLY
Qty: 60 TABLET | Refills: 1 | Status: SHIPPED | OUTPATIENT
Start: 2022-02-28 | End: 2022-02-28 | Stop reason: HOSPADM

## 2022-02-28 RX ORDER — QUETIAPINE FUMARATE 25 MG/1
75 TABLET, FILM COATED ORAL NIGHTLY
Status: DISCONTINUED | OUTPATIENT
Start: 2022-02-28 | End: 2022-02-28 | Stop reason: HOSPADM

## 2022-02-28 RX ORDER — QUETIAPINE FUMARATE 25 MG/1
75 TABLET, FILM COATED ORAL NIGHTLY
Qty: 60 TABLET | Refills: 3 | Status: SHIPPED | OUTPATIENT
Start: 2022-02-28 | End: 2022-02-28

## 2022-02-28 RX ORDER — LEVETIRACETAM 1000 MG/1
1000 TABLET ORAL 2 TIMES DAILY
Qty: 60 TABLET | Refills: 3 | Status: SHIPPED | OUTPATIENT
Start: 2022-02-28 | End: 2022-09-09

## 2022-02-28 RX ORDER — CHOLECALCIFEROL (VITAMIN D3) 125 MCG
5 CAPSULE ORAL NIGHTLY PRN
Qty: 30 TABLET | Refills: 0 | Status: ON HOLD | OUTPATIENT
Start: 2022-02-28 | End: 2022-06-08 | Stop reason: HOSPADM

## 2022-02-28 RX ORDER — QUETIAPINE FUMARATE 25 MG/1
50 TABLET, FILM COATED ORAL NIGHTLY
Qty: 60 TABLET | Refills: 3 | Status: SHIPPED | OUTPATIENT
Start: 2022-02-28 | End: 2022-03-16 | Stop reason: SDUPTHER

## 2022-02-28 RX ORDER — HEPARIN SODIUM (PORCINE) LOCK FLUSH IV SOLN 100 UNIT/ML 100 UNIT/ML
500 SOLUTION INTRAVENOUS PRN
Status: DISCONTINUED | OUTPATIENT
Start: 2022-02-28 | End: 2022-02-28 | Stop reason: HOSPADM

## 2022-02-28 RX ORDER — LAMOTRIGINE 25 MG/1
125 TABLET ORAL 2 TIMES DAILY
Qty: 30 TABLET | Refills: 3 | Status: SHIPPED | OUTPATIENT
Start: 2022-02-28 | End: 2022-02-28

## 2022-02-28 RX ORDER — HYDROCORTISONE 25 MG/G
CREAM TOPICAL
Qty: 1 EACH | Refills: 1 | Status: SHIPPED | OUTPATIENT
Start: 2022-02-28

## 2022-02-28 RX ORDER — LAMOTRIGINE 25 MG/1
150 TABLET ORAL 2 TIMES DAILY
Qty: 180 TABLET | Refills: 3 | Status: SHIPPED | OUTPATIENT
Start: 2022-02-28 | End: 2022-02-28 | Stop reason: HOSPADM

## 2022-02-28 RX ORDER — HYDROCORTISONE 25 MG/G
CREAM TOPICAL
Qty: 1 EACH | Refills: 1 | Status: SHIPPED | OUTPATIENT
Start: 2022-02-28 | End: 2022-02-28

## 2022-02-28 RX ORDER — LAMOTRIGINE 25 MG/1
125 TABLET ORAL 2 TIMES DAILY
Qty: 300 TABLET | Refills: 3 | Status: ON HOLD | OUTPATIENT
Start: 2022-02-28 | End: 2022-06-08 | Stop reason: SDUPTHER

## 2022-02-28 RX ORDER — QUETIAPINE FUMARATE 25 MG/1
50 TABLET, FILM COATED ORAL NIGHTLY
Qty: 60 TABLET | Refills: 3 | Status: SHIPPED | OUTPATIENT
Start: 2022-02-28 | End: 2022-02-28 | Stop reason: HOSPADM

## 2022-02-28 RX ORDER — QUETIAPINE FUMARATE 25 MG/1
TABLET, FILM COATED ORAL
Qty: 60 TABLET | Refills: 3 | Status: SHIPPED | OUTPATIENT
Start: 2022-02-28 | End: 2022-02-28 | Stop reason: HOSPADM

## 2022-02-28 RX ADMIN — PANTOPRAZOLE SODIUM 40 MG: 40 INJECTION, POWDER, FOR SOLUTION INTRAVENOUS at 08:38

## 2022-02-28 RX ADMIN — HEPARIN 500 UNITS: 100 SYRINGE at 17:54

## 2022-02-28 RX ADMIN — LEVETIRACETAM 1000 MG: 500 TABLET, FILM COATED ORAL at 08:38

## 2022-02-28 RX ADMIN — MORPHINE SULFATE 1 MG: 4 INJECTION INTRAVENOUS at 05:48

## 2022-02-28 RX ADMIN — LAMOTRIGINE 125 MG: 100 TABLET ORAL at 08:38

## 2022-02-28 RX ADMIN — MORPHINE SULFATE 1 MG: 4 INJECTION INTRAVENOUS at 11:14

## 2022-02-28 RX ADMIN — LORAZEPAM 1 MG: 1 TABLET ORAL at 18:16

## 2022-02-28 RX ADMIN — ONDANSETRON 4 MG: 2 INJECTION INTRAMUSCULAR; INTRAVENOUS at 17:52

## 2022-02-28 RX ADMIN — HYDROCORTISONE 2.5%: 25 CREAM TOPICAL at 12:03

## 2022-02-28 RX ADMIN — APIXABAN 5 MG: 5 TABLET, FILM COATED ORAL at 08:37

## 2022-02-28 RX ADMIN — MORPHINE SULFATE 1 MG: 4 INJECTION INTRAVENOUS at 15:04

## 2022-02-28 RX ADMIN — SODIUM CHLORIDE, PRESERVATIVE FREE 10 ML: 5 INJECTION INTRAVENOUS at 08:40

## 2022-02-28 ASSESSMENT — PAIN SCALES - GENERAL
PAINLEVEL_OUTOF10: 7
PAINLEVEL_OUTOF10: 8
PAINLEVEL_OUTOF10: 9

## 2022-02-28 NOTE — CONSULTS
Department of Psychiatry  Behavioral Health Consult    REASON FOR CONSULT: Auditory and visual hallucinations    CONSULTING PHYSICIAN: Dr. Alexandra Schneider   History obtained from: Patient and chart. HISTORY OF PRESENT ILLNESS:    The patient is a 59 y.o. female with significant past psychiatric history of depression and anxiety and a medical history significant for ovarian cancer with mets. The patient has been receiving chemotherapy and radiotherapy. She had presented with nausea vomiting and altered mental status. The patient was seen using telehealth equipment. She was able to engage in the interview and answer my questions though she was slow in her speech. She was oriented to time place and person. Her thought process was linear and coherent. She denies any delusions or paranoia. She has been experiencing auditory hallucinations  - Music, different noises. Can hear people's voices even after nobody in the room. She has experienced visual hallucinations 2 days ago. She was seeing animals in her room. The patient has a history of seizures. Says seizure happened after the first chemo infusion. She was put on Seizure medications. When she was admitted she didn't get her seizure meds and ended up having a seizure.     -Lost youngest son 3 years ago. Other son was diagnosed with cancer. Patient was tearful talking about these. She has been treated with antidepressants and has found it beneficial.  Patient denies any history of isela.    -Has been sleeping better with Seroquel. The patient is currently receiving care for the above psychiatric illness.       Psychiatric Review of Systems        ·    Obsessions and Compulsions: Denies    ·    Isela or Hypomania: Denies  ·    Hallucinations: Denies  ·    Panic Attacks:  Denies  ·    Delusions:  Denies  ·    Phobias:  Denies  ·    Trauma: Denies      Substance Abuse History:  ETOH: Denies alcohol abuse  Marijuana: denies  Opiates: denies  Other Drugs: denies. Past Psychiatric History:  Prior Diagnosis: Depression and anxiety. Hospitalization: no  Hx of Suicidal Attempts: no  Hx of violence:  no  Lorazepam and Zoloft were prescribed. Personal History: Lives with son. Gets disability. Was a  for a pharmacy. Past Medical History:        Diagnosis Date    Anemia     Bleeding 10/2020    intra-abdominal bleeding -due to splenic mass with GI infiltration. Status post embolization    [facility]     Depression     Diabetes mellitus ([facility]     GERD (gastroesophageal reflux disease)     Hx of blood clots     Hypertension     Metastatic cancer ([facility] [address] 10/2020    extensive intraabdominal and splenic involvement and lung mets.  Ovarian cancer ([facility] [address]     low grade serous ovarian carcinoma    Post chemo evaluation     2007: Chemo via med onc (Dr. Tony Santos), 2008: Randall Moreno due to rising CA-125, 2013: intraperitoneal chemo,12/2015: Ca125 - 25     Splenic lesion        Past Surgical History:        Procedure Laterality Date    ABSCESS DRAINAGE  2013    Franca rectal    ANUS SURGERY      ANAL FISSURECTOMY    CARDIAC CATHETERIZATION      COLECTOMY  03/2013    ex lap, tumor debulking, transverse colectomy w reanastamosis, subgastric omentectomy, intraperitoneal port placement    HYSTERECTOMY, TOTAL ABDOMINAL      IR EMBOLIZATION HEMORRHAGE  10/05/2020    intra-abdominal bleeding -due to splenic mass with GI infiltration. Status post embolization [employer] interlock coils x7. mri condtional 3t ok, safe immediately post implant.     IR PORT PLACEMENT EQUAL OR GREATER THAN 5 YEARS  08/24/2020    IR PORT PLACEMENT EQUAL OR GREATER THAN 5 YEARS 8/24/2020 Jacquie Deras MD [facility]    PORT SURGERY      IP Port    TONSILLECTOMY           Medications Prior to Admission:   Medications Prior to Admission: LORazepam (ATIVAN) 1 MG tablet, Take 1 tablet by mouth every 8 hours as needed for Anxiety for up to 30 doses.  polyethylene glycol (MIRALAX) 17 g packet, Take 17 g by mouth daily as needed for Constipation  lamoTRIgine (LAMICTAL) 25 MG tablet, TAKE 3 TABS IN IN THE MORNING AND 3 TABS IN IN THE EVENING  levETIRAcetam (KEPPRA) 750 MG tablet, Take 2 tablets by mouth 2 times daily  diazePAM 5 MG/0.1ML LIQD, 1 puff by Nasal route as needed (seizures) (Patient not taking: Reported on 2022)  [] morphine (MS CONTIN) 30 MG extended release tablet, TAKE 1 TABLET BY MOUTH 2 TIMES DAILY FOR 30 DAYS. [] morphine (MS CONTIN) 15 MG extended release tablet, TAKE 1 TABLET BY MOUTH 2 TIMES DAILY FOR 30 DAYS. [] oxyCODONE-acetaminophen (PERCOCET) 5-325 MG per tablet, TAKE 1 TABLET BY MOUTH EVERY 4 HOURS AS NEEDED FOR PAIN (BREAKTHROUGH PAIN) - REDUCE DOSES TAKEN AS PAIN BECOMES MANAGEABLE  pantoprazole (PROTONIX) 40 MG tablet, Take 1 tablet by mouth daily  ondansetron (ZOFRAN-ODT) 4 MG disintegrating tablet, Take 1 tablet by mouth every 8 hours as needed for Nausea or Vomiting  naloxegol (MOVANTIK) 12.5 MG TABS tablet, Take 1 tablet by mouth every morning (Patient not taking: Reported on 2022)  ferrous sulfate (IRON 325) 325 (65 Fe) MG tablet, Take 1 tablet by mouth daily (with breakfast)  morphine (MSIR) 30 MG tablet, Take 30 mg by mouth 2 times daily as needed for Pain.   sertraline (ZOLOFT) 100 MG tablet, TAKE 1 TABLET BY MOUTH DAILY  ELIQUIS 5 MG TABS tablet, Take 5 mg by mouth 2 times daily     Allergies:  Ceftriaxone    FAMILY/SOCIAL HISTORY:  Family History   Problem Relation Age of Onset    Alcohol Abuse Mother     Cirrhosis Mother      Social History     Socioeconomic History    Marital status: Single     Spouse name: Not on file    Number of children: Not on file    Years of education: Not on file    Highest education level: Not on file   Occupational History    Not on file   Tobacco Use    Smoking status: Current Every Day Smoker     Packs/day: 1.00     Types: Cigarettes    Smokeless tobacco: Current User   Vaping Use    Vaping Use: Never used   Substance and Sexual Activity    Alcohol use: Not Currently    Drug use: Never    Sexual activity: Not on file   Other Topics Concern    Not on file   Social History Narrative    Not on file     Social Determinants of Health     Financial Resource Strain:     Difficulty of Paying Living Expenses: Not on file   Food Insecurity:     Worried About Running Out of Food in the Last Year: Not on file    Cierra of Food in the Last Year: Not on file   Transportation Needs:     Lack of Transportation (Medical): Not on file    Lack of Transportation (Non-Medical):  Not on file   Physical Activity:     Days of Exercise per Week: Not on file    Minutes of Exercise per Session: Not on file   Stress:     Feeling of Stress : Not on file   Social Connections:     Frequency of Communication with Friends and Family: Not on file    Frequency of Social Gatherings with Friends and Family: Not on file    Attends Confucianism Services: Not on file    Active Member of Clubs or Organizations: Not on file    Attends Club or Organization Meetings: Not on file    Marital Status: Not on file   Intimate Partner Violence:     Fear of Current or Ex-Partner: Not on file    Emotionally Abused: Not on file    Physically Abused: Not on file    Sexually Abused: Not on file   Housing Stability:     Unable to Pay for Housing in the Last Year: Not on file    Number of [place] in the Last Year: Not on file    Unstable Housing in the Last Year: Not on file       REVIEW OF SYSTEMS    Constitutional: [] fever  [] chills  [] weight loss  []weakness [] Other:  Eyes:  [] photophobia  [] discharge [] acuity change   [] Diplopia   [] Other:  HENT:  [] sore throat  [] ear pain [] Tinnitus   [] Other  Respiratory:  [] Cough  [] Shortness of breath   [] Sputum   [] Other:   Cardiac: []Chest pain   []Palpitations []Edema  []PND  [] Other:  GI:  []Abdominal pain   []Nausea []Vomiting  []Diarrhea  [] Other:  :  [] Dysuria   []Frequency  []Hematuria  []Discharge  [] Other:  Possible Pregnancy: []Yes   []No   LMP:   Musculoskeletal:  []Back pain  []Neck pain  []Recent Injury   Skin:  []Rash  [] Itching  [] Other:  Neurologic:  [] Headache  [] Focal weakness  [] Sensory changes []Other:  Endocrine:  [] Polyuria  [] Polydipsia  [] Hair Loss  [] Other:  Lymphatic:   [] Swollen glands   Psychiatric:  As per HPI      All other systems negative except as marked or mentioned/indicated in the HPI. Aleksandr Vickers PHYSICAL EXAM:  Vitals:  BP (!) 142/64   Pulse 78   Temp 97.4 °F (36.3 °C) (Axillary)   Resp 16   Ht 5' 5\" (1.651 m)   Wt 163 lb 12.8 oz (74.3 kg)   SpO2 94%   BMI 27.26 kg/m²      Neuro Exam:     Involuntary Movements: No    Mental Status Examination:    Level of consciousness:  within normal limits   Appearance:  Hospital attire  Behavior/Motor:  no abnormalities noted  Attitude toward examiner:  cooperative and attentive  Speech:  spontaneous, normal rate and normal volume   Mood: anxious and depressed  Affect:  mood congruent  Thought processes:  linear, goal directed and coherent   Thought content:  Suicidal Ideation:  denies suicidal ideation  Delusions:  no evidence of delusions  Perceptual Disturbance:  denies any perceptual disturbance  Cognition:  oriented to person, place, and time   Concentration intact  Memory intact  Insight fair   Judgement fair   Fund of Knowledge adequate        LABS: REVIEWED TODAY:  Recent Labs     02/26/22  0721 02/27/22  0611 02/28/22  0605   WBC 5.0 8.6 8.9   HGB 10.9* 11.1* 10.7*    297 281     Recent Labs     02/26/22  0721 02/27/22  0611 02/28/22  0605    142 141   K 4.2 4.0 4.5    105 106   CO2 25 25 24   BUN 7 9 17   CREATININE 0.39* 0.38* 0.50   GLUCOSE 83 85 110*     No results for input(s): BILITOT, ALKPHOS, AST, ALT in the last 72 hours.   Lab Results   Component Value Date    PEGGYGARCÍA NEGATIVE 02/21/2022    LABBENZ NEGATIVE 02/21/2022    LABMETH NEGATIVE 02/21/2022    PPXUR NOT REPORTED 02/21/2022     Lab Results   Component Value Date    TSH 2.93 03/09/2021     No results found for: LITHIUM  No results found for: VALPROATE, CBMZ  No results found for: LITHIUM, VALPROATE    FURTHER LABS ORDERED :      Radiology   ECHO Complete 2D W Doppler W Color    Result Date: 2/22/2022  Transthoracic Echocardiography Report (TTE)  Patient Name Luisito Reilly      Date of Study               02/22/2022               [facility]   Date of      1965  Gender                      Female  Birth   Age          60 year(s)  Race                           Room Number  0468        Height:                     66 inch, 167.64 cm   Corporate ID [id number]    Weight:                     150 pounds, 68 kg  #   Patient Acct [de-identified]   BSA:          1.77 m^2      BMI:       24.21  #                                                               kg/m^2   MR #         [id number]     Sonographer                 Brandin Paula   Accession #  [id number]  Interpreting Physician      Wesley Kirk   Fellow                   Referring Nurse                           Practitioner   Interpreting             Referring Physician         Yony Peres MD  Fellow  Type of Study   TTE procedure:2D Echocardiogram, Limited Echo. Procedure Date Date: 02/22/2022 Start: 12:11 PM Study Location: [facility] Technical Quality: Fair visualization Indications:Pericardial effusion, Altered mental status and Elevated troponin. History / Tech. Comments: Procedure explained to patient. Limited echo to re-assess pericardial effusion. Patient Status: Inpatient Height: 66 inches Weight: 150 pounds BSA: 1.77 m^2 BMI: 24.21 kg/m^2 CONCLUSIONS Summary Limited study Global left ventricular systolic function is normal. Estimated ejection fraction is 55 % . Small to moderate pericardial effusion seen mostly posteriorly, somewhat improved from last study in 06/2021.  No echo signs of tamponade. Signature ----------------------------------------------------------------------------  Electronically signed by Ju Suresh(Sonographer) on 02/22/2022  12:42 PM ---------------------------------------------------------------------------- ----------------------------------------------------------------------------  Electronically signed by Wesley Kirk(Interpreting physician) on  02/22/2022 01:03 PM ---------------------------------------------------------------------------- FINDINGS Left Ventricle Global left ventricular systolic function is normal. Estimated ejection fraction is 55 % . Calculated EF via 3D Heart Model is 55 %. Right Ventricle Right ventricular function appears normal . Pericardial Effusion Small to moderate pericardial effusion seen mostly posteriorly. Miscellaneous IVC normal diameter & inspiratory collapse indicating normal RA filling pressure . XR ABDOMEN (KUB) (SINGLE AP VIEW)    Result Date: 2/24/2022  EXAMINATION: ONE SUPINE XRAY VIEW(S) OF THE ABDOMEN 2/24/2022 11:35 am COMPARISON: 02/22/2022 HISTORY: ORDERING SYSTEM PROVIDED HISTORY: F/U Ileus TECHNOLOGIST PROVIDED HISTORY: F/U Ileus Reason for Exam: port supine FINDINGS: The bowel gas pattern is unremarkable. There is no significant bowel distension. Contrast is noted in the colon. There is a pelvic catheter in place. Embolization coils are noted in the left upper quadrant. There is a peripherally calcified gallstone in the the right upper quadrant. No change from prior examination. Bowel gas pattern is unremarkable. Other incidental findings as described above.      XR ABDOMEN (KUB) (SINGLE AP VIEW)    Result Date: 2/20/2022  EXAMINATION: ONE SUPINE XRAY VIEW(S) OF THE ABDOMEN 2/20/2022 11:18 pm COMPARISON: January 4, 2021 HISTORY: ORDERING SYSTEM PROVIDED HISTORY: rule out ileus TECHNOLOGIST PROVIDED HISTORY: rule out ileus Reason for Exam: supine,nausea,vomiting FINDINGS: 37 mm acute abnormality of the visualized skull or soft tissues. Similar small right temporal flat skin lesion. No acute intracranial abnormality. CT ABDOMEN PELVIS W IV CONTRAST Additional Contrast? Oral    Result Date: 2/22/2022  EXAMINATION: CT OF THE ABDOMEN AND PELVIS WITH CONTRAST 2/22/2022 9:41 pm TECHNIQUE: CT of the abdomen and pelvis was performed with the administration of intravenous contrast. Multiplanar reformatted images are provided for review. Dose modulation, iterative reconstruction, and/or weight based adjustment of the mA/kV was utilized to reduce the radiation dose to as low as reasonably achievable. COMPARISON: Abdomen and pelvis CTs dating to 03/09/2021, PET/CT 08/20/2020 HISTORY: ORDERING SYSTEM PROVIDED HISTORY: assess disease status TECHNOLOGIST PROVIDED HISTORY: Compare to previous scan and comment on disease status assess disease status Reason for Exam: assess disease status, Altered Mental Status; Nausea Vomiting FINDINGS: Lack of intra-abdominal fat, partially limiting evaluation of the peritoneal cavity. LOWER CHEST:  Increased size of a 3.3 cm x 1.9 cm noncalcified solid mass in the posterior basilar segment of the left lower lobe (2.6 cm x 1.5 cm on 10/29/2021). Increased size of additional noncalcified solid nodules scattered throughout both lung bases. Normal heart size. Persistent trace bilateral pleural effusions and mild pericardial effusion. No significant change in partially calcified right pleural lesions; for reference, 2.1 cm x 1.2 cm lesion near the right cardiophrenic angle (2.3 cm x 1.3 cm on 10/29/2021). No definite pericardial nodularity. Increased size of a 1.1 cm x 0.9 cm right retrocrural lymph node (0.9 cm x 0.7 cm 10/20/2021). ORGANS:  Cholelithiasis without findings of acute cholecystitis. No intrahepatic nor extrahepatic biliary dilation. Mild to moderate pancreatic atrophy. Mild splenic atrophy with abnormal contour due to scarring.   Normal liver, adrenal glands, and kidneys GI/BOWEL:  Gastric tube tip at the gastroesophageal junction with the sideport not included but likely in the mid thoracic esophagus. Increased luminal narrowing of the mid sigmoid colon along the left pelvic sidewall. Otherwise normal course and caliber of the stomach, small bowel, colon, and rectum without obstruction. Normal appendix. PELVIS:  Surgically absent uterus and ovaries. Normal urinary bladder. Laxity of the pelvic floor musculature. PERITONEUM/RETROPERITONEUM:  Changes related to coil embolization of the splenic artery. Moderate systemic atherosclerosis. No significant change in partially calcified retroperitoneal and bilateral pelvic lymph nodes; for reference, 3.1 cm x 2.3 cm right external iliac node (3.1 cm x 2.4 cm on 10/29/2021). No free intraperitoneal fluid nor gas. At most slight increase in size of partially calcified peritoneal lesions; for reference, 4.7 cm x 4.1 cm lesion in the central pelvis (4.2 cm x 4.0 cm on 10/29/2021). Unchanged peritoneal port catheter with the hub in the left upper quadrant and the tip along the right pelvic sidewall. SOFT TISSUES/BONES:  Closed midline laparotomy incision. Foci of gas in the subcutaneous tissues of the bilateral anterior abdominal wall likely related to medicinal injections. Increased size of a mildly enlarged 2.7 cm x 2.0 cm left inguinal lymph node with abnormal morphology (2.2 cm x 1.6 cm on 10/29/2021). No right inguinal lymphadenopathy. No significant change scattered blastic lesions in the axial skeleton. No acute fracture. 1. Disease progression with increased size of bilateral pulmonary metastases, slightly increased size of right retrocrural and left inguinal nannette metastases, and possible slight increase in size of peritoneal metastases. Abdominal and pelvic nannette metastases and skeletal metastases appear unchanged.  2. Persistent trace bilateral pleural effusions and small pericardial chest 03/09/2021. CT abdomen and pelvis 10/29/2021. HISTORY: ORDERING SYSTEM PROVIDED HISTORY: eval for pe TECHNOLOGIST PROVIDED HISTORY: eval for pe Decision Support Exception - unselect if not a suspected or confirmed emergency medical condition->Emergency Medical Condition (MA) Reason for Exam: eval for pe FINDINGS: Pulmonary Arteries: Pulmonary arteries are adequately opacified for evaluation. No evidence of intraluminal filling defect to suggest pulmonary embolism. Main pulmonary artery is normal in caliber. Mediastinum: Mildly enlarged confluent hilar lymph nodes and lymphatic tissue in the AP window and subcarinal regions again demonstrated. Small pericardial effusion, similar in appearance. There is no acute abnormality of the thoracic aorta. Right internal jugular port in place. Lungs/pleura: Respiratory motion artifact noted. Mild septal thickening. Persistent nodular opacity in the left lower lobe on axial image 63 measuring up to 2.6 cm. Scattered ground-glass nodules are again demonstrated bilaterally. The amount of left pleural fluid has diminished in the interval with trace amount remaining. Soft tissue calcification near the supra [place] IVC again demonstrated. Upper Abdomen: Coarse calcification near the medial and inferior aspect of the spleen partially visualized, as seen previously. Dense metallic artifact corresponding to metallic coil pack near the spleen again demonstrated. Cholelithiasis. Soft Tissues/Bones: Blastic metastatic deposits predominantly in the lower thoracic and upper lumbar spine again demonstrated. 1.  No evidence for acute pulmonary embolism. 2.  Bilateral pulmonary nodules, thoracic lymphadenopathy, soft tissue calcifications in the chest and upper abdomen and sclerotic bone lesions again demonstrated, as described previously, which may be related to the patient's history of malignancy. 3.  Small pericardial effusion. Trace left pleural effusion.  4.  Mild septal thickening suggestive of interstitial edema. XR ABDOMEN FOR NG/OG/NE TUBE PLACEMENT    Result Date: 2/22/2022  EXAMINATION: ONE SUPINE XRAY VIEW(S) OF THE ABDOMEN 2/22/2022 3:26 pm COMPARISON: 02/20/2022 HISTORY: NG tube placement. FINDINGS: Enteric tube tip is within the proximal stomach with side port several cm below the gastroesophageal junction. Embolization coils noted within the left upper quadrant. A large calcified density in the right upper quadrant is unchanged. Cholecystectomy clips noted. Partially visualized catheter along the left lower quadrant. Enteric tube is appropriately positioned. IR PLACE NG TUBE BY DR Sarah Deluca    Result Date: 2/21/2022  PROCEDURE: XR PLACE NASOGASTRIC TUBE PHYS 2/21/2022 HISTORY: ORDERING SYSTEM PROVIDED HISTORY: ileus TECHNOLOGIST PROVIDED HISTORY: ileus Ileus CONTRAST: None SEDATION: None FLUOROSCOPY DOSE AND TYPE OR TIME AND EXPOSURES: Fluoro time 0.7 minutes  cGy cm 2 DESCRIPTION OF PROCEDURE: This procedure was performed by Anthony DAVID under indirect supervision of . Conway protocol was observed. An attempt was made to pass a 12 [place] sump through right and left nostrils without success due to stricture in the nasal cavity. Under fluoroscopic guidance, through the left nostril, a 12 [place] sump nasogastric tube was negotiated into the stomach. With catheter manipulation, the tip of the catheter was manipulated into the distal stomach/proximal duodenum. Air bolus administration confirmed satisfactory tip position. The catheter was secured appropriately at 74 cm. Estimated blood loss was 0 mL. The patient tolerated the procedure well. Successful placement of nasogastric tube. Okay to use.      MRI BRAIN W WO CONTRAST    Result Date: 2/24/2022  EXAMINATION: MRI OF THE BRAIN WITHOUT AND WITH CONTRAST  2/22/2022 10:37 am TECHNIQUE: Multiplanar multisequence MRI of the head/brain was performed without and with the administration of intravenous contrast. COMPARISON: None. HISTORY: ORDERING SYSTEM PROVIDED HISTORY: AMS. Rule out mets TECHNOLOGIST PROVIDED HISTORY: AMS. Rule out mets Reason for Exam: AMS. Rule out mets FINDINGS: 5 mm focus of increased signal on DWI images is noted within the medial aspect of right thalamus. There is no discrete corresponding signal abnormality on T2/FLAIR images. Finding is likely artifactual.  Acute lacunar infarction is less likely. There is no acute territorial infarction, intracranial hemorrhage, or intracranial mass lesion. No mass effect, midline shift, or extra-axial collection is noted. No focus of abnormal enhancement. There are mild nonspecific foci of periventricular and subcortical cerebral white matter T2/FLAIR hyperintensity, most likely representing chronic microangiopathic disease in this age group. The brain parenchyma is otherwise normal. The pituitary gland is normal in appearance. The cerebellar tonsils are in normal position. The ventricles, sulci, and cisterns are prominent suggestive of generalized volume loss. The intracranial flow voids are preserved. The globes and orbits are within normal limits. The visualized extracranial structures including paranasal sinuses and mastoid air cells are unremarkable. No focus of abnormal enhancement to suggest intracranial metastatic disease. 5 mm focus of increased signal on DWI images is noted within the medial aspect of right thalamus. There is no discrete corresponding signal abnormality on T2/FLAIR images. Finding is likely artifactual. Acute lacunar infarction is less likely. There is no acute infarction, intracranial hemorrhage, or intracranial mass lesion. Mild chronic microangiopathic ischemic disease. Mild generalized volume loss. The findings were sent to the Radiology Results [address] at 6:55 pm on 2/24/2022to be communicated to a licensed caregiver.  RECOMMENDATIONS: Unavailable       EKG: qtc is 468ms      DIAGNOSIS:    MDD, recurrent, severe  Lactic acidosis  Delirium due to medical care      RISK ASSESSMENT:  low risk of suicide or harm to others        RECOMMENDATIONS    Risk Management:  routine:  no special precautions necessary    Medications:   Continue Zoloft 100 mg daily. Seroquel increased to 75 mg at nighttime  Discussed with the treating physician/ team about the patient and treatment plan  Reviewed the chart    Discussed with the patient risk, benefit, alternative and common side effects for the  proposed medication treatment. Patient is consenting to the treatment. Thanks for the consult. Please call me if needed. Migdalia Kumar is a 59 y.o. female being evaluated by a Virtual Visit (video visit) encounter to address concerns as mentioned above. A caregiver was present in the room along with the patient. Pursuant to the emergency declaration under the [address] [place] [address] authority and the [employer] Act, this Virtual Visit was conducted with patient's (and/or legal guardian's) consent, to reduce the patient's risk of exposure to COVID-19 and provide necessary medical care. Services were provided through a video synchronous discussion virtually to substitute for in-person visit by provider. Patient is present at [place]  and I am physically present at my office in [place], [place]     --Sukumar Weaver MD on 2/28/2022 at 2:03 PM    An electronic signature was used to authenticate this note. **This report has been created using voice recognition software. It may contain minor errors which are inherent in voice recognition technology. **

## 2022-02-28 NOTE — PLAN OF CARE
Problem: Pain:  Goal: Pain level will decrease  Description: Pain level will decrease  Outcome: Completed  Goal: Control of acute pain  Description: Control of acute pain  Outcome: Completed  Goal: Control of chronic pain  Description: Control of chronic pain  Outcome: Completed     Problem: Nausea/Vomiting:  Goal: Absence of nausea/vomiting  Description: Absence of nausea/vomiting  Outcome: Completed  Goal: Able to drink  Description: Able to drink  Outcome: Completed  Goal: Able to eat  Description: Able to eat  Outcome: Completed  Goal: Ability to achieve adequate nutritional intake will improve  Description: Ability to achieve adequate nutritional intake will improve  Outcome: Completed     Problem: Infection - Central Venous Catheter-Associated Bloodstream Infection:  Goal: Will show no infection signs and symptoms  Description: Will show no infection signs and symptoms  Outcome: Completed     Problem: Skin Integrity:  Goal: Will show no infection signs and symptoms  Description: Will show no infection signs and symptoms  Outcome: Completed  Goal: Absence of new skin breakdown  Description: Absence of new skin breakdown  Outcome: Completed     Problem: Falls - Risk of:  Goal: Will remain free from falls  Description: Will remain free from falls  Outcome: Completed  Goal: Absence of physical injury  Description: Absence of physical injury  Outcome: Completed     Problem: Musculor/Skeletal Functional Status  Goal: Highest potential functional level  Outcome: Completed     Problem: Mental Status - Impaired:  Goal: Mental status will improve  Description: Mental status will improve  Outcome: Completed

## 2022-02-28 NOTE — PROGRESS NOTES
CLINICAL PHARMACY NOTE: MEDS TO BEDS    Total # of Prescriptions Filled: 6   The following medications were delivered to the patient:  · Melatonin  · keppra  · Lamictal  · Hydrocortisone  · Seroquel  · percocet    Additional Documentation:

## 2022-02-28 NOTE — PROGRESS NOTES
and had intraperitoneal chemotherapy treatment with Taxol. Patient was last seen by her oncologist in  at which time she had relatively stable disease with normal tumor marker and no significant abnormal images. Patient moved to [place] after that and she was lost for oncology follow-ups. Patient states that she came back to [place] 3 years ago and has been following with an oncologist since. Patient states her last chemotherapy was last week on . She received Gemzar for chemotherapy and nausea, vomiting, diarrhea, mouth sores, drowsiness, muscle aches are some of the known side effects of Gemzar.     Reimaging confirmed metastatic relapse and biopsy confirmed ovarian cancer recurrence. Scan showed extensive intra-abdominal and splenic involvement and lung metastasis. CT brain was unremarkable on 2022. Patient currently has extensive intra-abdominal, splenic and lung involvement from ovarian cancer.  Patient with recent intra-abdominal bleeding due to splenic mass with GI infiltration s/p embolization. She is currently on palliative treatment with Doxil. Patient recently had a DVT last week for which she was started on Eliquis.  Patient's last chemotherapy was on 2022 when she received Gemzar for chemotherapy. Patient is doing well, improved during hospital stay. No seizure episodes since admission. Neurology recommends outpatient follow-up. Outpatient follow-up with heme oncology. Patient had auditory and visual hallucinations. Psychiatry is consulted.   Awaiting the recommendations      OBJECTIVE     Vital Signs:  BP (!) 131/56   Pulse 75   Temp 98.4 °F (36.9 °C) (Oral)   Resp 16   Ht 5' 5\" (1.651 m)   Wt 163 lb 12.8 oz (74.3 kg)   SpO2 95%   BMI 27.26 kg/m²     Temp (24hrs), Av.4 °F (36.9 °C), Min:98.2 °F (36.8 °C), Max:98.6 °F (37 °C)    In: 480   Out: 2700 [Urine:2700]    Physical Exam:  Constitutional:alert, oriented, cooperative and in no PRN  sodium chloride, 25 mL, PRN  acetaminophen, 650 mg, Q6H PRN   Or  acetaminophen, 650 mg, Q6H PRN  potassium chloride, 40 mEq, PRN   Or  potassium alternative oral replacement, 40 mEq, PRN   Or  potassium chloride, 10 mEq, PRN  morphine, 1 mg, Q4H PRN  promethazine, 12.5 mg, Q6H PRN   Or  ondansetron, 4 mg, Q6H PRN        Diagnostic Labs:  CBC:   Recent Labs     02/26/22  0721 02/27/22  0611 02/28/22  0605   WBC 5.0 8.6 8.9   RBC 3.72* 3.82* 3.67*   HGB 10.9* 11.1* 10.7*   HCT 35.7* 36.8 36.3   MCV 96.0 96.3 98.9   RDW 17.3* 17.5* 17.7*    297 281     BMP:   Recent Labs     02/26/22  0721 02/27/22  0611 02/28/22  0605    142 141   K 4.2 4.0 4.5    105 106   CO2 25 25 24   BUN 7 9 17   CREATININE 0.39* 0.38* 0.50     BNP: No results for input(s): BNP in the last 72 hours. PT/INR: No results for input(s): PROTIME, INR in the last 72 hours. APTT: No results for input(s): APTT in the last 72 hours. CARDIAC ENZYMES: No results for input(s): CKMB, CKMBINDEX, TROPONINI in the last 72 hours. Invalid input(s): CKTOTAL;3  FASTING LIPID PANEL:  Lab Results   Component Value Date    CHOL 131 03/10/2021    HDL 33 (L) 03/10/2021    TRIG 147 03/10/2021     LIVER PROFILE: No results for input(s): AST, ALT, ALB, BILIDIR, BILITOT, ALKPHOS in the last 72 hours. MICROBIOLOGY:   Lab Results   Component Value Date/Time    CULTURE NO GROWTH 5 DAYS 02/20/2022 11:20 AM       Imaging:    XR ABDOMEN (KUB) (SINGLE AP VIEW)    Result Date: 2/24/2022  No change from prior examination. Bowel gas pattern is unremarkable. Other incidental findings as described above. CT ABDOMEN PELVIS W IV CONTRAST Additional Contrast? Oral    Result Date: 2/22/2022  1. Disease progression with increased size of bilateral pulmonary metastases, slightly increased size of right retrocrural and left inguinal nannette metastases, and possible slight increase in size of peritoneal metastases.  Abdominal and pelvic nannette metastases and skeletal metastases appear unchanged. 2. Persistent trace bilateral pleural effusions and small pericardial effusion, likely malignant in etiology. 3. Worsened stricturing of the mid sigmoid colon with no obstruction at this time. This may be a sequela of prior treatment to the area. 4. The gastric tube has been retracted with tip now at the gastroesophageal junction and the sideport not included. Recommend advancement     XR CHEST PORTABLE    Result Date: 2/26/2022  No definite pulmonary infiltration or edema. XR ABDOMEN FOR NG/OG/NE TUBE PLACEMENT    Result Date: 2/22/2022  Enteric tube is appropriately positioned. IR PLACE NG TUBE BY DR Marcus Horton    Result Date: 2/21/2022  Successful placement of nasogastric tube. Okay to use. MRI BRAIN W WO CONTRAST    Result Date: 2/24/2022  No focus of abnormal enhancement to suggest intracranial metastatic disease. 5 mm focus of increased signal on DWI images is noted within the medial aspect of right thalamus. There is no discrete corresponding signal abnormality on T2/FLAIR images. Finding is likely artifactual. Acute lacunar infarction is less likely. There is no acute infarction, intracranial hemorrhage, or intracranial mass lesion. Mild chronic microangiopathic ischemic disease. Mild generalized volume loss. The findings were sent to the Radiology Results [address] at 6:55 pm on 2/24/2022to be communicated to a licensed caregiver. RECOMMENDATIONS: Unavailable       ASSESSMENT & PLAN     IMPRESSION  This is a 57 y. o. female with a PMH of ovarian cancer with metastasis post chemotherapy/radiotherapy couple of days ago  presented with N/V, chills, fatigue, weakess and altered mental status.  Patient also has elevated troponins for which cardiology is on board and new leukocytosis for which hematology is following.  Patient admitted to inpatient status for further managament.      1.  Acute metabolic encephalopathy: Resolved  - Secondary to dehydration versus electrolyte abnormalities versus UTI vs seizures. - MRI brain negative for new metastasis or stroke. - Completed Unasyn and received iv lfuids, currently on pureed diet as per Speech recommendations. Follow recommendations for diet.   - Has auditory and visual hallucinations, follow psychiatry recommendation  - Continue seroquel 25 mg nightly. Continue PRN ativan and MS contin.    -Outpatient follow-up with neurology     2. Seizures:   - MRI does not show acute abnormality. EEG showed Frequent left occipital temporal lateralized periodic discharges. - Continue Keppra dose adjusted to 1000 mg bid orally and Lamictal 125 mg bid   -Outpatient follow-up with neurology     3. Recurrent ovarian cancer with extensive metastasis  - Chemotherapy using gemcitabine with stable CA-125 and with side effects abdominal pain nausea and vomiting. MRI brain shows no metastasis. No chemotherapy IP. Heme oncology recommended outpatient follow-up     4. Chronic DVT of femoral vein of lower extremity:   - Continue eliquis.       5. Hypokalemia: Resolved      6. Ileus:  Resolved     7. Acute respiratory failure with hypoxia with unclear etiology:  Improved. on NC 1 L       DVT ppx: on Eliquis  GI ppx: Protonix     PT/OT/SW: Consulted  Discharge Planning: Case management consulted    Lluvia Monae MD  Internal Medicine Resident, PGY-1  [address], [place]  2/28/2022, 6:56 AM   Attending Physician Statement  I have discussed the care of [address] [place], [place] [address], including pertinent history and exam findings,  with the resident. I have seen and examined the patient and the key elements of all parts of the encounter have been performed by me. I agree with the assessment, plan and orders as documented by the resident with additions . Treatment plan Discussed with nursing staff in detail , all questions answered .    Electronically signed by Narinder Logan MD on   2/28/22 at 10:02 PM EST    Please note that this chart was generated using voice recognition Dragon dictation software. Although every effort was made to ensure the accuracy of this automated transcription, some errors in transcription may have occurred.

## 2022-03-03 NOTE — DISCHARGE SUMMARY
[address]     Department of Internal Medicine - Staff Internal Medicine Teaching Service    INPATIENT DISCHARGE SUMMARY      Patient Identification:  Marian Moore is a 59 y.o. female. :  1965  MRN: [id number]     Acct: [de-identified]   PCP: Elisabet Matute MD  Admit Date:  2022  Discharge date and time: 2022  6:24 PM   Attending Provider: No att. providers found                                     [address] Problem Lists:  Principal Problem:    Dehydration  Active Problems:    Seizure ([address]    Type 2 diabetes mellitus without complication, without long-term current use of insulin (HCC)    Encephalopathy acute    Cancer, metastatic to bone (HCC)    Fatigue    Chronic deep vein thrombosis (DVT) of femoral vein of left lower extremity (HCC)    Ovarian cancer (HCC)    AMS (altered mental status)    Lactic acidosis    Ileus (HCC)    Elevated troponin    Pericardial effusion    Hypokalemia    Acute respiratory failure with hypoxia (HCC)    Acute metabolic encephalopathy    Confusion    Urinary tract infection without hematuria    Seizure disorder ([address]    Breakthrough seizure ([address]  Resolved Problems:    * No resolved hospital problems. [facility] STAY     The patient is a 23-year-old female with significant past medical history of ovarian cancer with metastasis s/p chemo and radiotherapy, seizure disorder on Lamictal and Keppra who presents with altered mental status, nausea and vomiting.   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  She had surgical debulking and systemic treatment with Taxol and carboplatin for 6 cycles.  Received chemotherapy/radiotherapy couple of days ago. Patient also has elevated troponins for which cardiology is on board and new leukocytosis for which hematology is following.  Patient admitted to inpatient status for further managament of following conditions. metastasis post chemotherapy/radiotherapy couple of days ago  presented with N/V, chills, fatigue, weakess and altered mental status.  Patient also has elevated troponins for which cardiology is on board and new leukocytosis for which hematology is following.  Patient admitted to inpatient status for further managament.   1. Acute metabolic encephalopathy:  - Secondary to dehydration versus electrolyte abnormalities versus UTI vs seizures. - MRI brain negative for new metastasis or stroke. - Completed Unasyn and received iv lfuids.   - Has auditory and visual hallucinations,   - Continue seroquel 25 mg nightly. - Continue Keppra dose adjusted to 1000 mg bid orally and Lamictal 125 mg bid   -Outpatient follow-up with neurology   3. Recurrent ovarian cancer with extensive metastasis.   Heme oncology recommended outpatient follow-up  4. Chronic DVT of femoral vein of lower extremity:   - Continue eliquis.     6. Ileus:  Resolved  7. Acute respiratory failure with hypoxia with unclear resolved       Procedures/ Significant Interventions:    none    Consults:     Consults:     Final Specialist Recommendations/Findings:   IP CONSULT TO ONCOLOGY  IP CONSULT TO INTERNAL MEDICINE  IP CONSULT TO CASE MANAGEMENT  IP CONSULT TO PSYCHIATRY      Any Hospital Acquired Infections: none    Discharge Functional Status:  stable    DISCHARGE PLAN     Disposition:home    Patient Instructions:   Discharge Medication List as of 2/28/2022  4:28 PM      START taking these medications    Details   melatonin 5 MG TABS tablet Take 1 tablet by mouth nightly as needed (sleep), Disp-30 tablet, R-0Normal      QUEtiapine (SEROQUEL) 25 MG tablet Take 3 tablets by mouth nightly, Disp-60 tablet, R-3Normal      hydrocortisone (ANUSOL-HC) 2.5 % CREA rectal cream Apply on affected area, Disp-1 each, R-1, Normal         CONTINUE these medications which have CHANGED    Details   oxyCODONE-acetaminophen (PERCOCET) 5-325 MG per tablet Take 1 tablet by mouth every 8 hours as needed for Pain for up to 30 days. , Disp-90 tablet, R-0Print      levETIRAcetam (KEPPRA) 1000 MG tablet Take 1 tablet by mouth 2 times daily, Disp-60 tablet, R-3Normal      lamoTRIgine (LAMICTAL) 25 MG tablet Take 5 tablets by mouth 2 times daily, Disp-30 tablet, R-3Normal         CONTINUE these medications which have NOT CHANGED    Details   LORazepam (ATIVAN) 1 MG tablet Take 1 tablet by mouth every 8 hours as needed for Anxiety for up to 30 doses. , Disp-90 tablet, R-0Normal      polyethylene glycol (MIRALAX) 17 g packet Take 17 g by mouth daily as needed for Constipation, Disp-527 g, R-1Normal      pantoprazole (PROTONIX) 40 MG tablet Take 1 tablet by mouth daily, Disp-60 tablet, R-0Normal      ondansetron (ZOFRAN-ODT) 4 MG disintegrating tablet Take 1 tablet by mouth every 8 hours as needed for Nausea or Vomiting, Disp-21 tablet, R-0Normal      ferrous sulfate (IRON 325) 325 (65 Fe) MG tablet Take 1 tablet by mouth daily (with breakfast), Disp-30 tablet, R-2Normal      morphine (MSIR) 30 MG tablet Take 30 mg by mouth 2 times daily as needed for Pain. Historical Med      sertraline (ZOLOFT) 100 MG tablet TAKE 1 TABLET BY MOUTH DAILY, Disp-30 tablet, R-3Normal      ELIQUIS 5 MG TABS tablet Take 5 mg by mouth 2 times daily , DAWHistorical Med         STOP taking these medications       diazePAM 5 MG/0.1ML LIQD Comments:   Reason for Stopping:         morphine (MS CONTIN) 30 MG extended release tablet Comments:   Reason for Stopping:         morphine (MS CONTIN) 15 MG extended release tablet Comments:   Reason for Stopping:         naloxegol (MOVANTIK) 12.5 MG TABS tablet Comments:   Reason for Stopping:               Activity: as tolerated    Diet: regular diet    Follow-up:    Abel Pfeiffer MD  [address]  [phone]    Today  ca ovary on chemo    [facility]  [facility]  [phone]    history of seizures, on antiseizers liudmila LIU/ [address]  [address] [facility] [id number] [phone]  In 2 weeks  hospital follow up      Patient Instructions:   Continue with taking Seroquel 75 mg at night   Continue taking your Zoloft 100 daily  Continue on Lamictal and Keppra, we will increased dose  Follow-up with heme-onc as outpatient  Follow-up with primary care physician within 2 weeks  Follow-up with neurology as outpatient  Return to the ED for worsening of symptoms  Follow up labs: none  Follow up imaging: none    Note that over 30 minutes was spent in preparing discharge papers, discussing discharge with patient, medication review, etc.      Zainab Kasper MD, MD  Internal Medicine Resident, PGY-1  Jess[facility] Webber;  [facility]  3/2/2022, 8:10 PM

## 2022-03-04 ENCOUNTER — TELEPHONE (OUTPATIENT)
Dept: ONCOLOGY | Age: 57
End: 2022-03-04

## 2022-03-04 ENCOUNTER — OFFICE VISIT (OUTPATIENT)
Dept: ONCOLOGY | Age: 57
End: 2022-03-04
Payer: COMMERCIAL

## 2022-03-04 VITALS
TEMPERATURE: 98.5 F | WEIGHT: 166.6 LBS | RESPIRATION RATE: 16 BRPM | BODY MASS INDEX: 27.72 KG/M2 | DIASTOLIC BLOOD PRESSURE: 77 MMHG | HEART RATE: 78 BPM | SYSTOLIC BLOOD PRESSURE: 138 MMHG

## 2022-03-04 DIAGNOSIS — C79.60 MALIGNANT NEOPLASM METASTATIC TO OVARY, UNSPECIFIED LATERALITY (HCC): Primary | ICD-10-CM

## 2022-03-04 PROCEDURE — 1111F DSCHRG MED/CURRENT MED MERGE: CPT | Performed by: INTERNAL MEDICINE

## 2022-03-04 PROCEDURE — 4004F PT TOBACCO SCREEN RCVD TLK: CPT | Performed by: INTERNAL MEDICINE

## 2022-03-04 PROCEDURE — 99214 OFFICE O/P EST MOD 30 MIN: CPT | Performed by: INTERNAL MEDICINE

## 2022-03-04 PROCEDURE — G8417 CALC BMI ABV UP PARAM F/U: HCPCS | Performed by: INTERNAL MEDICINE

## 2022-03-04 PROCEDURE — 3017F COLORECTAL CA SCREEN DOC REV: CPT | Performed by: INTERNAL MEDICINE

## 2022-03-04 PROCEDURE — 99211 OFF/OP EST MAY X REQ PHY/QHP: CPT | Performed by: INTERNAL MEDICINE

## 2022-03-04 PROCEDURE — G8484 FLU IMMUNIZE NO ADMIN: HCPCS | Performed by: INTERNAL MEDICINE

## 2022-03-04 PROCEDURE — G8427 DOCREV CUR MEDS BY ELIG CLIN: HCPCS | Performed by: INTERNAL MEDICINE

## 2022-03-04 NOTE — TELEPHONE ENCOUNTER
AVS from 3/4/22     Add Carboplatin  RV 4-6 weeks         Carbo added, pt will be scheduled once precert comes back per md   rv will be made once chemo is scheduled    Pt was given AVS and appt schedule

## 2022-03-04 NOTE — TELEPHONE ENCOUNTER
met with patient after Oncology appointment to discuss scheduling counseling appointment.  Patient requesting 3/15 at 1:00pm.

## 2022-03-09 RX ORDER — ONDANSETRON 4 MG/1
4 TABLET, ORALLY DISINTEGRATING ORAL EVERY 8 HOURS PRN
Qty: 60 TABLET | Refills: 2 | Status: SHIPPED | OUTPATIENT
Start: 2022-03-09 | End: 2022-05-13 | Stop reason: SDUPTHER

## 2022-03-10 RX ORDER — DIPHENHYDRAMINE HYDROCHLORIDE 50 MG/ML
50 INJECTION INTRAMUSCULAR; INTRAVENOUS ONCE
Status: CANCELLED | OUTPATIENT
Start: 2022-03-25 | End: 2022-03-18

## 2022-03-10 RX ORDER — EPINEPHRINE 1 MG/ML
0.3 INJECTION, SOLUTION, CONCENTRATE INTRAVENOUS PRN
Status: CANCELLED | OUTPATIENT
Start: 2022-04-15

## 2022-03-10 RX ORDER — DIPHENHYDRAMINE HYDROCHLORIDE 50 MG/ML
50 INJECTION INTRAMUSCULAR; INTRAVENOUS ONCE
Status: CANCELLED | OUTPATIENT
Start: 2022-03-18 | End: 2022-03-11

## 2022-03-10 RX ORDER — EPINEPHRINE 1 MG/ML
0.3 INJECTION, SOLUTION, CONCENTRATE INTRAVENOUS PRN
Status: CANCELLED | OUTPATIENT
Start: 2022-03-18

## 2022-03-10 RX ORDER — METHYLPREDNISOLONE SODIUM SUCCINATE 125 MG/2ML
125 INJECTION, POWDER, LYOPHILIZED, FOR SOLUTION INTRAMUSCULAR; INTRAVENOUS ONCE
Status: CANCELLED | OUTPATIENT
Start: 2022-03-18 | End: 2022-03-11

## 2022-03-10 RX ORDER — SODIUM CHLORIDE 0.9 % (FLUSH) 0.9 %
5 SYRINGE (ML) INJECTION PRN
Status: CANCELLED | OUTPATIENT
Start: 2022-04-08

## 2022-03-10 RX ORDER — SODIUM CHLORIDE 9 MG/ML
20 INJECTION, SOLUTION INTRAVENOUS ONCE
Status: CANCELLED | OUTPATIENT
Start: 2022-04-15 | End: 2022-04-08

## 2022-03-10 RX ORDER — HEPARIN SODIUM (PORCINE) LOCK FLUSH IV SOLN 100 UNIT/ML 100 UNIT/ML
500 SOLUTION INTRAVENOUS PRN
Status: CANCELLED | OUTPATIENT
Start: 2022-03-25

## 2022-03-10 RX ORDER — SODIUM CHLORIDE 9 MG/ML
20 INJECTION, SOLUTION INTRAVENOUS ONCE
Status: CANCELLED | OUTPATIENT
Start: 2022-03-18 | End: 2022-03-11

## 2022-03-10 RX ORDER — SODIUM CHLORIDE 0.9 % (FLUSH) 0.9 %
5 SYRINGE (ML) INJECTION PRN
Status: CANCELLED | OUTPATIENT
Start: 2022-03-18

## 2022-03-10 RX ORDER — HEPARIN SODIUM (PORCINE) LOCK FLUSH IV SOLN 100 UNIT/ML 100 UNIT/ML
500 SOLUTION INTRAVENOUS PRN
Status: CANCELLED | OUTPATIENT
Start: 2022-04-15

## 2022-03-10 RX ORDER — HEPARIN SODIUM (PORCINE) LOCK FLUSH IV SOLN 100 UNIT/ML 100 UNIT/ML
500 SOLUTION INTRAVENOUS PRN
Status: CANCELLED | OUTPATIENT
Start: 2022-03-18

## 2022-03-10 RX ORDER — SODIUM CHLORIDE 9 MG/ML
20 INJECTION, SOLUTION INTRAVENOUS ONCE
Status: CANCELLED | OUTPATIENT
Start: 2022-03-25 | End: 2022-03-18

## 2022-03-10 RX ORDER — SODIUM CHLORIDE 0.9 % (FLUSH) 0.9 %
10 SYRINGE (ML) INJECTION PRN
Status: CANCELLED | OUTPATIENT
Start: 2022-04-08

## 2022-03-10 RX ORDER — HEPARIN SODIUM (PORCINE) LOCK FLUSH IV SOLN 100 UNIT/ML 100 UNIT/ML
500 SOLUTION INTRAVENOUS PRN
Status: CANCELLED | OUTPATIENT
Start: 2022-04-08

## 2022-03-10 RX ORDER — DIPHENHYDRAMINE HYDROCHLORIDE 50 MG/ML
50 INJECTION INTRAMUSCULAR; INTRAVENOUS ONCE
Status: CANCELLED | OUTPATIENT
Start: 2022-04-15 | End: 2022-04-08

## 2022-03-10 RX ORDER — EPINEPHRINE 1 MG/ML
0.3 INJECTION, SOLUTION, CONCENTRATE INTRAVENOUS PRN
Status: CANCELLED | OUTPATIENT
Start: 2022-03-25

## 2022-03-10 RX ORDER — DIPHENHYDRAMINE HYDROCHLORIDE 50 MG/ML
50 INJECTION INTRAMUSCULAR; INTRAVENOUS ONCE
Status: CANCELLED | OUTPATIENT
Start: 2022-04-08 | End: 2022-04-01

## 2022-03-10 RX ORDER — SODIUM CHLORIDE 9 MG/ML
INJECTION, SOLUTION INTRAVENOUS CONTINUOUS
Status: CANCELLED | OUTPATIENT
Start: 2022-04-08

## 2022-03-10 RX ORDER — SODIUM CHLORIDE 9 MG/ML
INJECTION, SOLUTION INTRAVENOUS CONTINUOUS
Status: CANCELLED | OUTPATIENT
Start: 2022-03-18

## 2022-03-10 RX ORDER — SODIUM CHLORIDE 9 MG/ML
INJECTION, SOLUTION INTRAVENOUS CONTINUOUS
Status: CANCELLED | OUTPATIENT
Start: 2022-03-25

## 2022-03-10 RX ORDER — METHYLPREDNISOLONE SODIUM SUCCINATE 125 MG/2ML
125 INJECTION, POWDER, LYOPHILIZED, FOR SOLUTION INTRAMUSCULAR; INTRAVENOUS ONCE
Status: CANCELLED | OUTPATIENT
Start: 2022-03-25 | End: 2022-03-18

## 2022-03-10 RX ORDER — EPINEPHRINE 1 MG/ML
0.3 INJECTION, SOLUTION, CONCENTRATE INTRAVENOUS PRN
Status: CANCELLED | OUTPATIENT
Start: 2022-04-08

## 2022-03-10 RX ORDER — SODIUM CHLORIDE 9 MG/ML
20 INJECTION, SOLUTION INTRAVENOUS ONCE
Status: CANCELLED | OUTPATIENT
Start: 2022-04-08 | End: 2022-04-01

## 2022-03-10 RX ORDER — SODIUM CHLORIDE 0.9 % (FLUSH) 0.9 %
10 SYRINGE (ML) INJECTION PRN
Status: CANCELLED | OUTPATIENT
Start: 2022-03-25

## 2022-03-10 RX ORDER — METHYLPREDNISOLONE SODIUM SUCCINATE 125 MG/2ML
125 INJECTION, POWDER, LYOPHILIZED, FOR SOLUTION INTRAMUSCULAR; INTRAVENOUS ONCE
Status: CANCELLED | OUTPATIENT
Start: 2022-04-15 | End: 2022-04-08

## 2022-03-10 RX ORDER — METHYLPREDNISOLONE SODIUM SUCCINATE 125 MG/2ML
125 INJECTION, POWDER, LYOPHILIZED, FOR SOLUTION INTRAMUSCULAR; INTRAVENOUS ONCE
Status: CANCELLED | OUTPATIENT
Start: 2022-04-08 | End: 2022-04-01

## 2022-03-10 RX ORDER — SODIUM CHLORIDE 0.9 % (FLUSH) 0.9 %
10 SYRINGE (ML) INJECTION PRN
Status: CANCELLED | OUTPATIENT
Start: 2022-03-18

## 2022-03-10 RX ORDER — SODIUM CHLORIDE 0.9 % (FLUSH) 0.9 %
10 SYRINGE (ML) INJECTION PRN
Status: CANCELLED | OUTPATIENT
Start: 2022-04-15

## 2022-03-10 RX ORDER — SODIUM CHLORIDE 9 MG/ML
INJECTION, SOLUTION INTRAVENOUS CONTINUOUS
Status: CANCELLED | OUTPATIENT
Start: 2022-04-15

## 2022-03-10 RX ORDER — SODIUM CHLORIDE 0.9 % (FLUSH) 0.9 %
5 SYRINGE (ML) INJECTION PRN
Status: CANCELLED | OUTPATIENT
Start: 2022-04-15

## 2022-03-10 RX ORDER — SODIUM CHLORIDE 0.9 % (FLUSH) 0.9 %
5 SYRINGE (ML) INJECTION PRN
Status: CANCELLED | OUTPATIENT
Start: 2022-03-25

## 2022-03-10 NOTE — PROGRESS NOTES
_      Chief Complaint   Patient presents with    Follow-up     d/c from [facility] on 2-28-22 for UTI    Results     Ct scan     Other     don/t feel 100% due to new med     Pain     back pain      DIAGNOSIS:       Recurrent ovarian cancer. Original diagnosis 2005 with multiple recurrences. Diffuse metastasis. CURRENT THERAPY:         Doxil/ Avastin started 9/18/2020. Avastin was discontinued due to recent GI bleeding. Status post recent vascular embolization  S/p seizure disorder. Gemzar started 2/26/2021. Interrupted due to hospitalization and problem with compliance. Evidence of disease progression on CT scan 2/22/22  Add Carboplatin to Gemzar March 2022      BRIEF CASE HISTORY:      Ms. Milana Tesfaye is a very pleasant 59 y.o. female with history of multiple co morbidities as listed. The patient seen in consultation for ovarian cancer with multiple recurrences. 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 She had surgical debulking and she had systemic treatment with Taxol and carboplatin for 6 cycles. Patient was in remission for about 2 years. She had a relapse in 2007 and treated with topotecan with good results. Patient relapsed again in 2008 and was treated with Taxol carboplatin. After 3 cycles of systemic chemotherapy she had problems with carboplatin so she finished 3 more cycles with Taxol. She did well again for 2 to 3 years until she had a relapse in 2012 and she had intraperitoneal chemotherapy treatment with Taxol. Patient was last seen by her oncologist in 2014 at which time she had relatively stable disease with normal tumor marker and no significant abnormal images. Patient moved to [place] after that and she was lost for oncology follow-ups. Patient was recently evaluated again here in [facility] because of abdominal discomfort.  Re imaging confirmed metastatic relapse. Biopsy confirmed ovarian cancer recurrence. Scan showed extensive intraabdominal and splenic involvement and lung mets. She has no symptoms at the present time. Patient denies smoking or alcohol drinking.l    INTERIM HISTORY:    patient is seen for follow up recurrent ovarian cancer. Started on Gemzar. Tolerated well. No melena or hematochezia. No hematemesis. Currently stable. No recent seizure activities. Labs are stable as well. No severe anemia. No active bleeding. Chemotherapy is tolerated fairly well. PAST MEDICAL HISTORY: has a past medical history of Anemia, Bleeding, Cervical cancer ([facility] [address] Depression, Diabetes mellitus ([facility] [address] GERD (gastroesophageal reflux disease), Hx of blood clots, Hypertension, Metastatic cancer ([facility] [address] Ovarian cancer ([facility] [address] Post chemo evaluation, and Splenic lesion. PAST SURGICAL HISTORY: has a past surgical history that includes Hysterectomy, total abdominal; Port Surgery; Tonsillectomy; IR PORT PLACEMENT > 5 YEARS (08/24/2020); Anus surgery; Abscess Drainage (2013); colectomy (03/2013); IR EMBOLIZATION HEMORRHAGE (10/05/2020); and Cardiac catheterization. CURRENT MEDICATIONS:  has a current medication list which includes the following prescription(s): ondansetron, oxycodone-acetaminophen, levetiracetam, melatonin, hydrocortisone, lamotrigine, quetiapine, lorazepam, polyethylene glycol, pantoprazole, ferrous sulfate, morphine, sertraline, and eliquis. ALLERGIES:  is allergic to ceftriaxone. FAMILY HISTORY: Negative for any hematological or oncological conditions. SOCIAL HISTORY:  reports that she has been smoking cigarettes. She has been smoking about 1.00 pack per day. She uses smokeless tobacco. She reports previous alcohol use. She reports that she does not use drugs. REVIEW OF SYSTEMS:     · General: No weakness or fatigue. No unanticipated weight loss or decreased appetite. No fever or chills.    · Eyes: No blurred vision, eye pain or double vision. · Ears: No hearing problems or drainage. No tinnitus. · Throat: No sore throat, problems with swallowing or dysphagia. · Respiratory: No cough, sputum or hemoptysis. No shortness of breath. No pleuritic chest pain. · Cardiovascular: No chest pain, orthopnea or PND. No lower extremity edema. No palpitation. · Gastrointestinal: As above. · Genitourinary: No dysuria, hematuria, frequency or urgency. · Musculoskeletal: No muscle aches or pains. No limitation of movement. No back pain. No gait disturbance, No joint complaints. · Dermatologic: No skin rashes or pruritus. No skin lesions or discolorations. · Psychiatric: No depression, anxiety, or stress or signs of schizophrenia. No change in mood or affect. · Hematologic: No history of bleeding tendency. No bruises or ecchymosis. No history of clotting problems. · Infectious disease: No fever, chills or frequent infections. · Endocrine: No polydipsia or polyuria. No temperature intolerance. · Neurologic: No headaches or dizziness. No weakness or numbness of the extremities. No changes in balance, coordination,  memory, mentation, behavior. · Allergic/Immunologic: No nasal congestion or hives. No repeated infections. PHYSICAL EXAM:  The patient is not in acute distress. Vital signs: Blood pressure 138/77, pulse 78, temperature 98.5 °F (36.9 °C), temperature source Oral, resp. rate 16, weight 166 lb 9.6 oz (75.6 kg).      General appearance - well appearing, not in pain or distress  Mental status - good mood, alert and oriented  Eyes - pupils equal and reactive, extraocular eye movements intact  Ears - bilateral TM's and external ear canals normal  Nose - normal and patent, no erythema, discharge or polyps  Mouth - mucous membranes moist, pharynx normal without lesions  Neck - supple, no significant adenopathy  Lymphatics - no palpable lymphadenopathy, no hepatosplenomegaly  Chest - clear to auscultation, no wheezes, rales or rhonchi, symmetric air entry  Heart - normal rate, regular rhythm, normal S1, S2, no murmurs, rubs, clicks or gallops  Abdomen - soft, nontender, nondistended, no masses or organomegaly  Neurological - alert, oriented, normal speech, no focal findings or movement disorder noted  Musculoskeletal - no joint tenderness, deformity or swelling  Extremities - peripheral pulses normal, no pedal edema, no clubbing or cyanosis  Skin - normal coloration and turgor, no rashes, no suspicious skin lesions noted     Review of Diagnostic data:   Lab Results   Component Value Date    WBC 8.9 02/28/2022    HGB 10.7 (L) 02/28/2022    HCT 36.3 02/28/2022    MCV 98.9 02/28/2022     02/28/2022       Chemistry        Component Value Date/Time     02/28/2022 0605    K 4.5 02/28/2022 0605     02/28/2022 0605    CO2 24 02/28/2022 0605    BUN 17 02/28/2022 0605    CREATININE 0.50 02/28/2022 0605        Component Value Date/Time    CALCIUM 8.9 02/28/2022 0605    ALKPHOS 65 02/20/2022 1026    AST 12 02/20/2022 1026    ALT 7 02/20/2022 1026    BILITOT 0.16 (L) 02/20/2022 1026          Lab Results   Component Value Date     335 (H) 02/18/2022         IMPRESSION:   Recurrent ovarian cancer. Original diagnosis 2005 with multiple recurrences. Diffuse metastasis. Recent active intra-abdominal bleeding due to splenic mass with GI infiltration. Status post embolization  S/p seizure disorder. PLAN:   S/p multiple hospitalization for different problems as above. CT scan showed evidence of progressive disease. Currently patient is relatively stable and tolerating chemotherapy fairly well. Repeated tumor marker showed rising marker. CT scan showed progressive disease. Discussed options. We will add Carboplatin. Continue adjusted dose Gemzar. I will continue on treatment until progression or intolerable side effects. She was referred to psychiatry for management of depression. Patient's questions were answered to the best of her satisfaction and she verbalized full understanding and agreement.

## 2022-03-11 RX ORDER — CIPROFLOXACIN 500 MG/1
500 TABLET, FILM COATED ORAL 2 TIMES DAILY
Qty: 14 TABLET | Refills: 0 | Status: SHIPPED | OUTPATIENT
Start: 2022-03-11 | End: 2022-03-18

## 2022-03-11 NOTE — TELEPHONE ENCOUNTER
Pt called in c/o UTI. She states there is burning with urination and she has urgency/frequency. Discussed with Dr. Tenzin Oliver and he states to escribe cipro 500 BID x7 days. RX sent and pt notified.

## 2022-03-15 ENCOUNTER — TELEPHONE (OUTPATIENT)
Dept: INFUSION THERAPY | Age: 57
End: 2022-03-15

## 2022-03-16 DIAGNOSIS — C79.51 CANCER, METASTATIC TO BONE (HCC): ICD-10-CM

## 2022-03-16 DIAGNOSIS — M54.59 INTRACTABLE LOW BACK PAIN: ICD-10-CM

## 2022-03-16 RX ORDER — FERROUS SULFATE 325(65) MG
TABLET ORAL
Qty: 30 TABLET | Refills: 2 | OUTPATIENT
Start: 2022-03-16

## 2022-03-17 ENCOUNTER — TELEPHONE (OUTPATIENT)
Dept: ONCOLOGY | Age: 57
End: 2022-03-17

## 2022-03-17 RX ORDER — MORPHINE SULFATE 30 MG/1
30 TABLET, FILM COATED, EXTENDED RELEASE ORAL 2 TIMES DAILY
Qty: 60 TABLET | Refills: 0 | Status: SHIPPED | OUTPATIENT
Start: 2022-03-17 | End: 2022-04-13

## 2022-03-17 RX ORDER — OXYCODONE HYDROCHLORIDE AND ACETAMINOPHEN 5; 325 MG/1; MG/1
TABLET ORAL
Qty: 180 TABLET | Refills: 0 | Status: SHIPPED | OUTPATIENT
Start: 2022-03-17 | End: 2022-04-17

## 2022-03-17 RX ORDER — PANTOPRAZOLE SODIUM 40 MG/1
40 TABLET, DELAYED RELEASE ORAL DAILY
Qty: 60 TABLET | Refills: 0 | Status: SHIPPED | OUTPATIENT
Start: 2022-03-17 | End: 2022-05-09

## 2022-03-17 RX ORDER — QUETIAPINE FUMARATE 25 MG/1
TABLET, FILM COATED ORAL
Qty: 90 TABLET | Refills: 0 | Status: SHIPPED | OUTPATIENT
Start: 2022-03-17 | End: 2022-04-13

## 2022-03-17 NOTE — TELEPHONE ENCOUNTER
called patient to reschedule cancelled appointment. Message left encouraging patient to call at her earliest convenience.

## 2022-03-18 ENCOUNTER — HOSPITAL ENCOUNTER (OUTPATIENT)
Dept: INFUSION THERAPY | Age: 57
Discharge: HOME | End: 2022-03-18
Payer: COMMERCIAL

## 2022-03-18 VITALS
WEIGHT: 166 LBS | RESPIRATION RATE: 16 BRPM | TEMPERATURE: 98.8 F | DIASTOLIC BLOOD PRESSURE: 79 MMHG | BODY MASS INDEX: 27.62 KG/M2 | HEART RATE: 103 BPM | SYSTOLIC BLOOD PRESSURE: 151 MMHG

## 2022-03-18 DIAGNOSIS — Z85.43 HISTORY OF OVARIAN CANCER: ICD-10-CM

## 2022-03-18 DIAGNOSIS — C79.51 CANCER, METASTATIC TO BONE (HCC): ICD-10-CM

## 2022-03-18 DIAGNOSIS — C56.9 MALIGNANT NEOPLASM OF OVARY, UNSPECIFIED LATERALITY (HCC): Primary | ICD-10-CM

## 2022-03-18 LAB
ABSOLUTE EOS #: 0.4 K/UL (ref 0–0.4)
ABSOLUTE LYMPH #: 1 K/UL (ref 1–4.8)
ABSOLUTE MONO #: 0.5 K/UL (ref 0.1–1.2)
ALBUMIN SERPL-MCNC: 4.1 G/DL (ref 3.5–5.2)
ALBUMIN/GLOBULIN RATIO: 1.4 (ref 1–2.5)
ALP BLD-CCNC: 76 U/L (ref 35–104)
ALT SERPL-CCNC: 7 U/L (ref 5–33)
ANION GAP SERPL CALCULATED.3IONS-SCNC: 12 MMOL/L (ref 9–17)
AST SERPL-CCNC: 10 U/L
BASOPHILS # BLD: 1 % (ref 0–2)
BASOPHILS ABSOLUTE: 0.1 K/UL (ref 0–0.2)
BILIRUB SERPL-MCNC: 0.14 MG/DL (ref 0.3–1.2)
BUN BLDV-MCNC: 10 MG/DL (ref 6–20)
CA 125: 420 U/ML
CALCIUM SERPL-MCNC: 9 MG/DL (ref 8.6–10.4)
CHLORIDE BLD-SCNC: 103 MMOL/L (ref 98–107)
CO2: 25 MMOL/L (ref 20–31)
CREAT SERPL-MCNC: 0.54 MG/DL (ref 0.5–0.9)
EOSINOPHILS RELATIVE PERCENT: 5 % (ref 1–4)
GFR AFRICAN AMERICAN: >60 ML/MIN
GFR NON-AFRICAN AMERICAN: >60 ML/MIN
GFR SERPL CREATININE-BSD FRML MDRD: ABNORMAL ML/MIN/{1.73_M2}
GLUCOSE BLD-MCNC: 133 MG/DL (ref 70–99)
HCT VFR BLD CALC: 36.6 % (ref 36–46)
HEMOGLOBIN: 12 G/DL (ref 12–16)
LYMPHOCYTES # BLD: 12 % (ref 24–44)
MCH RBC QN AUTO: 29.8 PG (ref 26–34)
MCHC RBC AUTO-ENTMCNC: 32.8 G/DL (ref 31–37)
MCV RBC AUTO: 90.9 FL (ref 80–100)
MONOCYTES # BLD: 6 % (ref 2–11)
PDW BLD-RTO: 17.5 % (ref 12.5–15.4)
PLATELET # BLD: 581 K/UL (ref 140–450)
PMV BLD AUTO: 7.3 FL (ref 6–12)
POTASSIUM SERPL-SCNC: 3.9 MMOL/L (ref 3.7–5.3)
RBC # BLD: 4.02 M/UL (ref 4–5.2)
SEG NEUTROPHILS: 76 % (ref 36–66)
SEGMENTED NEUTROPHILS ABSOLUTE COUNT: 6.5 K/UL (ref 1.8–7.7)
SODIUM BLD-SCNC: 140 MMOL/L (ref 135–144)
TOTAL PROTEIN: 7 G/DL (ref 6.4–8.3)
WBC # BLD: 8.5 K/UL (ref 3.5–11)

## 2022-03-18 PROCEDURE — 2580000003 HC RX 258: Performed by: INTERNAL MEDICINE

## 2022-03-18 PROCEDURE — 86304 IMMUNOASSAY TUMOR CA 125: CPT

## 2022-03-18 PROCEDURE — 96413 CHEMO IV INFUSION 1 HR: CPT

## 2022-03-18 PROCEDURE — 80053 COMPREHEN METABOLIC PANEL: CPT

## 2022-03-18 PROCEDURE — 96367 TX/PROPH/DG ADDL SEQ IV INF: CPT

## 2022-03-18 PROCEDURE — 96417 CHEMO IV INFUS EACH ADDL SEQ: CPT

## 2022-03-18 PROCEDURE — 85025 COMPLETE CBC W/AUTO DIFF WBC: CPT

## 2022-03-18 PROCEDURE — 6360000002 HC RX W HCPCS: Performed by: INTERNAL MEDICINE

## 2022-03-18 PROCEDURE — 96375 TX/PRO/DX INJ NEW DRUG ADDON: CPT

## 2022-03-18 RX ORDER — PALONOSETRON 0.05 MG/ML
0.25 INJECTION, SOLUTION INTRAVENOUS ONCE
Status: COMPLETED | OUTPATIENT
Start: 2022-03-18 | End: 2022-03-18

## 2022-03-18 RX ORDER — SODIUM CHLORIDE 9 MG/ML
20 INJECTION, SOLUTION INTRAVENOUS ONCE
Status: COMPLETED | OUTPATIENT
Start: 2022-03-18 | End: 2022-03-18

## 2022-03-18 RX ORDER — DEXAMETHASONE SODIUM PHOSPHATE 10 MG/ML
10 INJECTION INTRAMUSCULAR; INTRAVENOUS ONCE
Status: COMPLETED | OUTPATIENT
Start: 2022-03-18 | End: 2022-03-18

## 2022-03-18 RX ORDER — SODIUM CHLORIDE 0.9 % (FLUSH) 0.9 %
10 SYRINGE (ML) INJECTION PRN
Status: DISCONTINUED | OUTPATIENT
Start: 2022-03-18 | End: 2022-03-19 | Stop reason: HOSPADM

## 2022-03-18 RX ORDER — HEPARIN SODIUM (PORCINE) LOCK FLUSH IV SOLN 100 UNIT/ML 100 UNIT/ML
500 SOLUTION INTRAVENOUS PRN
Status: DISCONTINUED | OUTPATIENT
Start: 2022-03-18 | End: 2022-03-19 | Stop reason: HOSPADM

## 2022-03-18 RX ADMIN — CARBOPLATIN 450 MG: 10 INJECTION INTRAVENOUS at 12:39

## 2022-03-18 RX ADMIN — SODIUM CHLORIDE 200 ML/HR: 9 INJECTION, SOLUTION INTRAVENOUS at 11:10

## 2022-03-18 RX ADMIN — HEPARIN 500 UNITS: 100 SYRINGE at 13:49

## 2022-03-18 RX ADMIN — FOSAPREPITANT DIMEGLUMINE 150 MG: 150 INJECTION, POWDER, LYOPHILIZED, FOR SOLUTION INTRAVENOUS at 11:24

## 2022-03-18 RX ADMIN — DEXAMETHASONE SODIUM PHOSPHATE 10 MG: 10 INJECTION INTRAMUSCULAR; INTRAVENOUS at 11:11

## 2022-03-18 RX ADMIN — GEMCITABINE 1800 MG: 38 INJECTION, SOLUTION INTRAVENOUS at 12:02

## 2022-03-18 RX ADMIN — SODIUM CHLORIDE, PRESERVATIVE FREE 10 ML: 5 INJECTION INTRAVENOUS at 13:49

## 2022-03-18 RX ADMIN — PALONOSETRON 0.25 MG: 0.05 INJECTION, SOLUTION INTRAVENOUS at 11:10

## 2022-03-18 RX ADMIN — SODIUM CHLORIDE, PRESERVATIVE FREE 10 ML: 5 INJECTION INTRAVENOUS at 10:23

## 2022-03-18 ASSESSMENT — PAIN DESCRIPTION - LOCATION: LOCATION: BACK

## 2022-03-18 ASSESSMENT — PAIN DESCRIPTION - PAIN TYPE: TYPE: CHRONIC PAIN

## 2022-03-18 ASSESSMENT — PAIN SCALES - GENERAL: PAINLEVEL_OUTOF10: 8

## 2022-03-18 ASSESSMENT — PAIN DESCRIPTION - ORIENTATION: ORIENTATION: LOWER

## 2022-03-18 NOTE — PROGRESS NOTES
Spoke to Dr. Laura Quiroga via telephone. He states he looked back at history and patient had an intolerance to his understanding and not a reaction to the medication. AUC of 4 to be given today because of that intolerance. Kerrie Johnson,   PharmD  3/18/2022 8:25 AM

## 2022-03-21 RX ORDER — SERTRALINE HYDROCHLORIDE 100 MG/1
100 TABLET, FILM COATED ORAL DAILY
Qty: 30 TABLET | Refills: 4 | Status: SHIPPED | OUTPATIENT
Start: 2022-03-21 | End: 2022-09-21

## 2022-03-22 PROBLEM — E86.0 DEHYDRATION: Status: RESOLVED | Noted: 2022-02-20 | Resolved: 2022-03-22

## 2022-03-23 PROBLEM — R79.89 ELEVATED TROPONIN: Status: RESOLVED | Noted: 2022-02-21 | Resolved: 2022-03-23

## 2022-03-23 PROBLEM — R77.8 ELEVATED TROPONIN: Status: RESOLVED | Noted: 2022-02-21 | Resolved: 2022-03-23

## 2022-03-24 DIAGNOSIS — C56.9 MALIGNANT NEOPLASM OF OVARY, UNSPECIFIED LATERALITY (HCC): Primary | ICD-10-CM

## 2022-03-25 ENCOUNTER — HOSPITAL ENCOUNTER (OUTPATIENT)
Dept: INFUSION THERAPY | Age: 57
Discharge: HOME | End: 2022-03-25
Payer: COMMERCIAL

## 2022-03-25 VITALS
HEART RATE: 87 BPM | DIASTOLIC BLOOD PRESSURE: 75 MMHG | BODY MASS INDEX: 27.56 KG/M2 | WEIGHT: 165.6 LBS | SYSTOLIC BLOOD PRESSURE: 169 MMHG | RESPIRATION RATE: 16 BRPM | TEMPERATURE: 97.6 F

## 2022-03-25 DIAGNOSIS — C79.51 CANCER, METASTATIC TO BONE (HCC): Primary | ICD-10-CM

## 2022-03-25 DIAGNOSIS — C56.9 MALIGNANT NEOPLASM OF OVARY, UNSPECIFIED LATERALITY (HCC): ICD-10-CM

## 2022-03-25 DIAGNOSIS — Z85.43 HISTORY OF OVARIAN CANCER: ICD-10-CM

## 2022-03-25 LAB
ABSOLUTE EOS #: 0.1 K/UL (ref 0–0.4)
ABSOLUTE LYMPH #: 1.1 K/UL (ref 1–4.8)
ABSOLUTE MONO #: 0.1 K/UL (ref 0.1–1.2)
ALBUMIN SERPL-MCNC: 4.2 G/DL (ref 3.5–5.2)
ALBUMIN/GLOBULIN RATIO: 1.6 (ref 1–2.5)
ALP BLD-CCNC: 75 U/L (ref 35–104)
ALT SERPL-CCNC: 11 U/L (ref 5–33)
ANION GAP SERPL CALCULATED.3IONS-SCNC: 12 MMOL/L (ref 9–17)
AST SERPL-CCNC: 14 U/L
BASOPHILS # BLD: 2 % (ref 0–2)
BASOPHILS ABSOLUTE: 0.1 K/UL (ref 0–0.2)
BILIRUB SERPL-MCNC: 0.16 MG/DL (ref 0.3–1.2)
BUN BLDV-MCNC: 10 MG/DL (ref 6–20)
CALCIUM SERPL-MCNC: 9 MG/DL (ref 8.6–10.4)
CHLORIDE BLD-SCNC: 105 MMOL/L (ref 98–107)
CO2: 25 MMOL/L (ref 20–31)
CREAT SERPL-MCNC: <0.4 MG/DL (ref 0.5–0.9)
EOSINOPHILS RELATIVE PERCENT: 4 % (ref 1–4)
GFR AFRICAN AMERICAN: ABNORMAL ML/MIN
GFR NON-AFRICAN AMERICAN: ABNORMAL ML/MIN
GFR SERPL CREATININE-BSD FRML MDRD: ABNORMAL ML/MIN/{1.73_M2}
GLUCOSE BLD-MCNC: 104 MG/DL (ref 70–99)
HCT VFR BLD CALC: 35.3 % (ref 36–46)
HEMOGLOBIN: 11.3 G/DL (ref 12–16)
LYMPHOCYTES # BLD: 40 % (ref 24–44)
MCH RBC QN AUTO: 29.2 PG (ref 26–34)
MCHC RBC AUTO-ENTMCNC: 32 G/DL (ref 31–37)
MCV RBC AUTO: 91.1 FL (ref 80–100)
MONOCYTES # BLD: 3 % (ref 2–11)
PDW BLD-RTO: 16.5 % (ref 12.5–15.4)
PLATELET # BLD: 363 K/UL (ref 140–450)
PMV BLD AUTO: 7 FL (ref 6–12)
POTASSIUM SERPL-SCNC: 3.8 MMOL/L (ref 3.7–5.3)
RBC # BLD: 3.88 M/UL (ref 4–5.2)
SEG NEUTROPHILS: 51 % (ref 36–66)
SEGMENTED NEUTROPHILS ABSOLUTE COUNT: 1.3 K/UL (ref 1.8–7.7)
SODIUM BLD-SCNC: 142 MMOL/L (ref 135–144)
TOTAL PROTEIN: 6.8 G/DL (ref 6.4–8.3)
WBC # BLD: 2.6 K/UL (ref 3.5–11)

## 2022-03-25 PROCEDURE — 96413 CHEMO IV INFUSION 1 HR: CPT

## 2022-03-25 PROCEDURE — 85025 COMPLETE CBC W/AUTO DIFF WBC: CPT

## 2022-03-25 PROCEDURE — 36591 DRAW BLOOD OFF VENOUS DEVICE: CPT

## 2022-03-25 PROCEDURE — 6360000002 HC RX W HCPCS: Performed by: INTERNAL MEDICINE

## 2022-03-25 PROCEDURE — 2580000003 HC RX 258: Performed by: INTERNAL MEDICINE

## 2022-03-25 PROCEDURE — 96375 TX/PRO/DX INJ NEW DRUG ADDON: CPT

## 2022-03-25 PROCEDURE — 80053 COMPREHEN METABOLIC PANEL: CPT

## 2022-03-25 RX ORDER — SODIUM CHLORIDE 0.9 % (FLUSH) 0.9 %
10 SYRINGE (ML) INJECTION PRN
Status: DISCONTINUED | OUTPATIENT
Start: 2022-03-25 | End: 2022-03-26 | Stop reason: HOSPADM

## 2022-03-25 RX ORDER — DEXAMETHASONE SODIUM PHOSPHATE 4 MG/ML
8 INJECTION, SOLUTION INTRA-ARTICULAR; INTRALESIONAL; INTRAMUSCULAR; INTRAVENOUS; SOFT TISSUE ONCE
Status: COMPLETED | OUTPATIENT
Start: 2022-03-25 | End: 2022-03-25

## 2022-03-25 RX ORDER — HEPARIN SODIUM (PORCINE) LOCK FLUSH IV SOLN 100 UNIT/ML 100 UNIT/ML
500 SOLUTION INTRAVENOUS PRN
Status: DISCONTINUED | OUTPATIENT
Start: 2022-03-25 | End: 2022-03-26 | Stop reason: HOSPADM

## 2022-03-25 RX ORDER — SODIUM CHLORIDE 9 MG/ML
20 INJECTION, SOLUTION INTRAVENOUS ONCE
Status: COMPLETED | OUTPATIENT
Start: 2022-03-25 | End: 2022-03-25

## 2022-03-25 RX ADMIN — SODIUM CHLORIDE, PRESERVATIVE FREE 10 ML: 5 INJECTION INTRAVENOUS at 11:24

## 2022-03-25 RX ADMIN — SODIUM CHLORIDE 20 ML/HR: 9 INJECTION, SOLUTION INTRAVENOUS at 10:24

## 2022-03-25 RX ADMIN — DEXAMETHASONE SODIUM PHOSPHATE 8 MG: 4 INJECTION, SOLUTION INTRAMUSCULAR; INTRAVENOUS at 10:25

## 2022-03-25 RX ADMIN — Medication 500 UNITS: at 11:24

## 2022-03-25 RX ADMIN — GEMCITABINE 1800 MG: 38 INJECTION, SOLUTION INTRAVENOUS at 10:41

## 2022-03-25 RX ADMIN — SODIUM CHLORIDE, PRESERVATIVE FREE 10 ML: 5 INJECTION INTRAVENOUS at 09:22

## 2022-03-25 RX ADMIN — SODIUM CHLORIDE, PRESERVATIVE FREE 10 ML: 5 INJECTION INTRAVENOUS at 09:20

## 2022-03-25 ASSESSMENT — PAIN DESCRIPTION - ORIENTATION: ORIENTATION: LOWER

## 2022-03-25 ASSESSMENT — PAIN DESCRIPTION - PAIN TYPE: TYPE: CHRONIC PAIN

## 2022-03-25 ASSESSMENT — PAIN DESCRIPTION - LOCATION: LOCATION: BACK

## 2022-03-25 ASSESSMENT — PAIN SCALES - GENERAL: PAINLEVEL_OUTOF10: 8

## 2022-03-25 NOTE — PROGRESS NOTES
Patient arrived for C15D8 gemzar. Labs drawn and reviewed w/ Dr Evette Swartz. Ok to treat w/ abs segs 1.3 today. Tolerated w/o incident and left in stable condition. Returns 4/8 . visit and tx.

## 2022-03-28 ENCOUNTER — TELEPHONE (OUTPATIENT)
Dept: ONCOLOGY | Age: 57
End: 2022-03-28

## 2022-04-06 NOTE — PROGRESS NOTES
[address] [place] [address]     Department of Internal Medicine - Staff Internal Medicine Service   ICU PATIENT TRANSFER NOTE        Patient:  Alejandro Love [facility]  YOB: 1965  MRN: [id number]     Acct: [de-identified]     Admit date: 1/4/2021    Code Status:-  Full code     Reason for ICU Admission:-Seizures/status epilepticus    SUPPORT DEVICES: [] Ventilator [] BIPAP  [x] Nasal Cannula [] Room Air    Consultations:- [x] Cardiology [] Nephrology  [x] Hemo onco  [] GI                               [x] ID [] ENT  [] Rheum [] Endo   []Physiotherapy                                 Others:-  Gen surg    NUTRITION:  [] NPO [] Tube Feeding (Specify: ) [] TPN  [x] PO    Central Lines:- [x] No   [] Yes           If yes - Days/Date of Insertion. Pt seen,examined and Chart reviewed. ICU COURSE:    78-year-old female with history of diabetes, GERD, metastatic ovarian cancer that is post bilateral salpingo-oophorectomy, hysterectomy who presented as a transfer from [facility] ER for status epilepticus. Candis Chaparro was found altered by family and upon EMS arrival she was noted to have a witnessed seizure and was given 4 mg of Versed without cessation of seizures. Elke Corado was an attempted nasal intubation which was unsuccessful and patient was transported to [facility] ED.  Patient was intubated and started on propofol.  Once propofol was started clinical seizure activity had resolved.  Patient was noted to have marked leukocytosis and broad-spectrum antibiotics with Vanco and Zosyn were started.  Patient was also given 5 L of LR for fluid resuscitation.     With regards to seizures, her initial diagnosis was in October 2020 where she was noted to have an MRI concerning for PRES.  She was discharged home on Keppra 1500 mg twice daily.    Patient also had a recent visit at [facility] for GI bleed where she  had splenic embolization and subsequent splenic abscess requiring drain placement.  Was noted to be on Invanz per records.     With regards to her ovarian cancer, she follows up with Dr. Maria Dolores Webber:   Patient arrived to [facility] ED and was noted to be agitated and biting her ET tube.  She was continued on propofol which was increased to 60 mics due to agitation. Patient was admitted to the neuro ICU.  She was continued on broad-spectrum antibiotics.  ID consult was obtained.  Antibiotics were switched to meropenem.  Preliminary culture with gram-negative rods.  General surgery was consulted with no acute surgical intervention at this time. Tolerated extubation and did well. Heme/onc on board for her metastatic ovarian ca. Transferred to floors for further management. Physical Exam:   Vitals: /70   Pulse 98   Temp 99 °F (37.2 °C) (Oral)   Resp 21   Ht 5' 6\" (1.676 m)   Wt 165 lb (74.8 kg)   SpO2 97%   BMI 26.63 kg/m²   24 hour intake/output:    Intake/Output Summary (Last 24 hours) at 1/7/2021 2325  Last data filed at 1/7/2021 1600  Gross per 24 hour   Intake 2685 ml   Output 695 ml   Net 1990 ml     Last 3 weights:   Wt [place] from Last 3 Encounters:   01/04/21 165 lb (74.8 kg)   01/04/21 165 lb (74.8 kg)   11/13/20 167 lb 12.8 oz (76.1 kg)       General appearance - alert, in no distress  Mental status - alert, oriented to person, place, and time  Eyes - pupils equal and reactive, extraocular eye movements intact  Mouth - mucous membranes moist, pharynx normal without lesions  Neck - supple, no significant adenopathy  Chest - clear to auscultation, no wheezes  Heart - normal rate, regular rhythm, normal S1, S2  Abdomen - soft, nontender, nondistended  Neurological - alert, oriented, normal speech, no focal deficits   Extremities - peripheral pulses normal, no pedal edema Skin - normal coloration and turgor, no rashes      Medications:Current Inpatient  Scheduled Meds:   pantoprazole  40 mg Oral BID AC    enoxaparin  40 mg Subcutaneous Daily    potassium bicarb-citric acid  40 mEq Oral Daily    meropenem  1 g Intravenous Q8H    sodium chloride flush  10 mL Intravenous 2 times per day    ferrous sulfate  325 mg Oral Daily with breakfast    sertraline  50 mg Oral Daily    atorvastatin  20 mg Oral Daily    levetiracetam  1,500 mg Intravenous Q12H     Continuous Infusions:   sodium chloride 100 mL/hr at 01/06/21 0750     PRN Meds:sodium chloride flush, sodium chloride flush, promethazine **OR** ondansetron, polyethylene glycol, acetaminophen **OR** acetaminophen, HYDROcodone-acetaminophen    Objective:    CBC:   Recent Labs     01/05/21  0526 01/06/21  0408 01/07/21  0325   WBC 19.9* 14.9* 14.3*   HGB 10.7* 10.1* 10.3*   * 812* 768*     BMP:    Recent Labs     01/05/21  0342 01/06/21  0408 01/07/21  0325   * 143 143   K 3.1* 3.3* 3.3*   CL 96* 108* 107   CO2 22 25 24   BUN 2* 3* 4*   CREATININE 0.21* 0.31* 0.31*   GLUCOSE 118* 104* 94     Calcium:  Recent Labs     01/07/21  0325   CALCIUM 8.3*     Ionized Calcium:No results for input(s): IONCA in the last 72 hours. Magnesium:  Recent Labs     01/07/21  0325   MG 1.7     Phosphorus:No results for input(s): PHOS in the last 72 hours. BNP:No results for input(s): BNP in the last 72 hours. Glucose:  Recent Labs     01/05/21  0342   POCGLU 129*     HgbA1C: No results for input(s): LABA1C in the last 72 hours. INR: No results for input(s): INR in the last 72 hours.   Hepatic:   Recent Labs     01/07/21  0325   ALKPHOS 83   ALT 5   AST 12   PROT 6.0*   BILITOT 0.25*   BILIDIR 0.09   LABALBU 2.9*     Amylase and Lipase:  Recent Labs     01/06/21  0930   LACTA NOT REPORTED     Lactic Acid:   Recent Labs     01/06/21  0930   LACTA NOT REPORTED NSTEMI-Cardiology on board, reduced EF 40 % on echo, will likely need cardiac cath, but considering diagnosis of metastatic cancer and other comorbidities further recommendations to follow.       Type 2 diabetes mellitus-continue to monitor blood sugars.     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 Currently on iron tablets. Sacral decubitus ulcers-wound care consulted. Hypokalemia-monitor and replace as needed     Leukocytosis- improving,continue to monitor     Thrombocytosis- improving.   Could partly be related to the infection process-continue to monitor     Chronic combined systolic and diastolic congestive heart failure-diuretics as tolerated, consider starting ACE inhibitors.     Small pericardial effusion-continue to monitor     On Lovenox for DVT prophylaxis  Protonix for GI prophylaxis      OT/PT-ongoing  Discharge planning-ongoing      Carmina Delgado MD             Department of Internal Medicine  [address] [facility]           1/7/2021, 11:25 PM 4 = No assist / stand by assistance

## 2022-04-08 ENCOUNTER — TELEPHONE (OUTPATIENT)
Dept: ONCOLOGY | Age: 57
End: 2022-04-08

## 2022-04-08 ENCOUNTER — HOSPITAL ENCOUNTER (OUTPATIENT)
Dept: INFUSION THERAPY | Age: 57
Discharge: HOME | End: 2022-04-08
Payer: COMMERCIAL

## 2022-04-08 ENCOUNTER — OFFICE VISIT (OUTPATIENT)
Dept: ONCOLOGY | Age: 57
End: 2022-04-08
Payer: COMMERCIAL

## 2022-04-08 VITALS
RESPIRATION RATE: 16 BRPM | DIASTOLIC BLOOD PRESSURE: 72 MMHG | SYSTOLIC BLOOD PRESSURE: 115 MMHG | WEIGHT: 170.9 LBS | BODY MASS INDEX: 28.44 KG/M2 | HEART RATE: 111 BPM | TEMPERATURE: 97.1 F

## 2022-04-08 DIAGNOSIS — Z85.43 HISTORY OF OVARIAN CANCER: ICD-10-CM

## 2022-04-08 DIAGNOSIS — G47.00 INSOMNIA, UNSPECIFIED TYPE: ICD-10-CM

## 2022-04-08 DIAGNOSIS — C79.60 MALIGNANT NEOPLASM METASTATIC TO OVARY, UNSPECIFIED LATERALITY (HCC): Primary | ICD-10-CM

## 2022-04-08 DIAGNOSIS — C79.51 CANCER, METASTATIC TO BONE (HCC): ICD-10-CM

## 2022-04-08 DIAGNOSIS — C56.9 MALIGNANT NEOPLASM OF OVARY, UNSPECIFIED LATERALITY (HCC): Primary | ICD-10-CM

## 2022-04-08 LAB
ABSOLUTE EOS #: 0.16 K/UL (ref 0–0.4)
ABSOLUTE LYMPH #: 1.09 K/UL (ref 1–4.8)
ABSOLUTE MONO #: 0.98 K/UL (ref 0.1–0.8)
ALBUMIN SERPL-MCNC: 3.4 G/DL (ref 3.5–5.2)
ALBUMIN/GLOBULIN RATIO: 1.3 (ref 1–2.5)
ALP BLD-CCNC: 94 U/L (ref 35–104)
ALT SERPL-CCNC: 6 U/L (ref 5–33)
ANION GAP SERPL CALCULATED.3IONS-SCNC: 12 MMOL/L (ref 9–17)
AST SERPL-CCNC: 9 U/L
BASOPHILS # BLD: 0 % (ref 0–2)
BASOPHILS ABSOLUTE: 0 K/UL (ref 0–0.2)
BILIRUB SERPL-MCNC: 0.12 MG/DL (ref 0.3–1.2)
BUN BLDV-MCNC: 9 MG/DL (ref 6–20)
CA 125: 362 U/ML
CALCIUM SERPL-MCNC: 8.2 MG/DL (ref 8.6–10.4)
CHLORIDE BLD-SCNC: 101 MMOL/L (ref 98–107)
CO2: 24 MMOL/L (ref 20–31)
CREAT SERPL-MCNC: 0.48 MG/DL (ref 0.5–0.9)
EOSINOPHILS RELATIVE PERCENT: 4 % (ref 1–4)
GFR AFRICAN AMERICAN: >60 ML/MIN
GFR NON-AFRICAN AMERICAN: >60 ML/MIN
GFR SERPL CREATININE-BSD FRML MDRD: ABNORMAL ML/MIN/{1.73_M2}
GLUCOSE BLD-MCNC: 155 MG/DL (ref 70–99)
HCT VFR BLD CALC: 29.5 % (ref 36–46)
HEMOGLOBIN: 9.4 G/DL (ref 12–16)
LYMPHOCYTES # BLD: 28 % (ref 24–44)
MCH RBC QN AUTO: 29 PG (ref 26–34)
MCHC RBC AUTO-ENTMCNC: 31.9 G/DL (ref 31–37)
MCV RBC AUTO: 91.1 FL (ref 80–100)
MONOCYTES # BLD: 25 % (ref 1–7)
MORPHOLOGY: ABNORMAL
MORPHOLOGY: ABNORMAL
PDW BLD-RTO: 17.7 % (ref 12.5–15.4)
PLATELET # BLD: 1179 K/UL (ref 140–450)
PMV BLD AUTO: 7.2 FL (ref 6–12)
POTASSIUM SERPL-SCNC: 3.6 MMOL/L (ref 3.7–5.3)
RBC # BLD: 3.24 M/UL (ref 4–5.2)
SEG NEUTROPHILS: 43 % (ref 36–66)
SEGMENTED NEUTROPHILS ABSOLUTE COUNT: 1.67 K/UL (ref 1.8–7.7)
SODIUM BLD-SCNC: 137 MMOL/L (ref 135–144)
TOTAL PROTEIN: 6 G/DL (ref 6.4–8.3)
WBC # BLD: 3.9 K/UL (ref 3.5–11)

## 2022-04-08 PROCEDURE — 96367 TX/PROPH/DG ADDL SEQ IV INF: CPT

## 2022-04-08 PROCEDURE — G8417 CALC BMI ABV UP PARAM F/U: HCPCS | Performed by: INTERNAL MEDICINE

## 2022-04-08 PROCEDURE — 96375 TX/PRO/DX INJ NEW DRUG ADDON: CPT

## 2022-04-08 PROCEDURE — 85025 COMPLETE CBC W/AUTO DIFF WBC: CPT

## 2022-04-08 PROCEDURE — 36591 DRAW BLOOD OFF VENOUS DEVICE: CPT

## 2022-04-08 PROCEDURE — G8427 DOCREV CUR MEDS BY ELIG CLIN: HCPCS | Performed by: INTERNAL MEDICINE

## 2022-04-08 PROCEDURE — 3017F COLORECTAL CA SCREEN DOC REV: CPT | Performed by: INTERNAL MEDICINE

## 2022-04-08 PROCEDURE — 4004F PT TOBACCO SCREEN RCVD TLK: CPT | Performed by: INTERNAL MEDICINE

## 2022-04-08 PROCEDURE — 99211 OFF/OP EST MAY X REQ PHY/QHP: CPT | Performed by: INTERNAL MEDICINE

## 2022-04-08 PROCEDURE — 96417 CHEMO IV INFUS EACH ADDL SEQ: CPT

## 2022-04-08 PROCEDURE — 2580000003 HC RX 258: Performed by: INTERNAL MEDICINE

## 2022-04-08 PROCEDURE — 96413 CHEMO IV INFUSION 1 HR: CPT

## 2022-04-08 PROCEDURE — 86304 IMMUNOASSAY TUMOR CA 125: CPT

## 2022-04-08 PROCEDURE — 6360000002 HC RX W HCPCS: Performed by: INTERNAL MEDICINE

## 2022-04-08 PROCEDURE — 80053 COMPREHEN METABOLIC PANEL: CPT

## 2022-04-08 PROCEDURE — 99214 OFFICE O/P EST MOD 30 MIN: CPT | Performed by: INTERNAL MEDICINE

## 2022-04-08 RX ORDER — DEXAMETHASONE SODIUM PHOSPHATE 10 MG/ML
10 INJECTION INTRAMUSCULAR; INTRAVENOUS ONCE
Status: COMPLETED | OUTPATIENT
Start: 2022-04-08 | End: 2022-04-08

## 2022-04-08 RX ORDER — HEPARIN SODIUM (PORCINE) LOCK FLUSH IV SOLN 100 UNIT/ML 100 UNIT/ML
500 SOLUTION INTRAVENOUS PRN
Status: DISCONTINUED | OUTPATIENT
Start: 2022-04-08 | End: 2022-04-09 | Stop reason: HOSPADM

## 2022-04-08 RX ORDER — SODIUM CHLORIDE 0.9 % (FLUSH) 0.9 %
10 SYRINGE (ML) INJECTION PRN
Status: DISCONTINUED | OUTPATIENT
Start: 2022-04-08 | End: 2022-04-09 | Stop reason: HOSPADM

## 2022-04-08 RX ORDER — LORAZEPAM 1 MG/1
1 TABLET ORAL EVERY 8 HOURS PRN
Qty: 90 TABLET | Refills: 0 | Status: SHIPPED | OUTPATIENT
Start: 2022-04-08 | End: 2022-05-13 | Stop reason: SDUPTHER

## 2022-04-08 RX ORDER — PALONOSETRON 0.05 MG/ML
0.25 INJECTION, SOLUTION INTRAVENOUS ONCE
Status: COMPLETED | OUTPATIENT
Start: 2022-04-08 | End: 2022-04-08

## 2022-04-08 RX ORDER — SODIUM CHLORIDE 9 MG/ML
20 INJECTION, SOLUTION INTRAVENOUS ONCE
Status: COMPLETED | OUTPATIENT
Start: 2022-04-08 | End: 2022-04-08

## 2022-04-08 RX ADMIN — SODIUM CHLORIDE, PRESERVATIVE FREE 10 ML: 5 INJECTION INTRAVENOUS at 09:13

## 2022-04-08 RX ADMIN — SODIUM CHLORIDE, PRESERVATIVE FREE 10 ML: 5 INJECTION INTRAVENOUS at 09:12

## 2022-04-08 RX ADMIN — PALONOSETRON HYDROCHLORIDE 0.25 MG: 0.25 INJECTION, SOLUTION INTRAVENOUS at 09:26

## 2022-04-08 RX ADMIN — CARBOPLATIN 460 MG: 10 INJECTION INTRAVENOUS at 10:48

## 2022-04-08 RX ADMIN — DEXAMETHASONE SODIUM PHOSPHATE 10 MG: 10 INJECTION INTRAMUSCULAR; INTRAVENOUS at 09:27

## 2022-04-08 RX ADMIN — FOSAPREPITANT 150 MG: 150 INJECTION, POWDER, LYOPHILIZED, FOR SOLUTION INTRAVENOUS at 09:31

## 2022-04-08 RX ADMIN — Medication 500 UNITS: at 11:51

## 2022-04-08 RX ADMIN — SODIUM CHLORIDE, PRESERVATIVE FREE 10 ML: 5 INJECTION INTRAVENOUS at 11:51

## 2022-04-08 RX ADMIN — GEMCITABINE 1800 MG: 38 INJECTION, SOLUTION INTRAVENOUS at 10:09

## 2022-04-08 RX ADMIN — SODIUM CHLORIDE 20 ML/HR: 9 INJECTION, SOLUTION INTRAVENOUS at 09:26

## 2022-04-08 NOTE — PROGRESS NOTES
Patient here for [address] [place]. Seen by Dr. Adrien Lyons today. Tolerated w/o incident and left in stable condition. Returns 4/15.

## 2022-04-08 NOTE — TELEPHONE ENCOUNTER
Please add  to todays labs  Proceed with chemo as ordered  RV 6-8 weeks    Confirmed phleb to add ca125     Tx today as scheduled    RV scheduled 6/3/22 @ 9am    PT was given AVS and an appt schedule    Electronically signed by Soheila Mccauley on 4/8/2022 at 9:55 AM

## 2022-04-11 ENCOUNTER — TELEPHONE (OUTPATIENT)
Dept: ONCOLOGY | Age: 57
End: 2022-04-11

## 2022-04-11 DIAGNOSIS — C79.51 CANCER, METASTATIC TO BONE (HCC): ICD-10-CM

## 2022-04-11 DIAGNOSIS — M54.59 INTRACTABLE LOW BACK PAIN: ICD-10-CM

## 2022-04-11 NOTE — TELEPHONE ENCOUNTER
Name: [facility]  : 1965  MRN: [id number]    Oncology Navigation Follow-Up Note    Contact Type:  Telephone  Notes: Attempted to contact pt for f/u call, no answer, VM left instructed pt may contact writer prn. Will continue to follow.     Electronically signed by Rigoberto Person RN on 2022 at 11:58 AM

## 2022-04-13 ENCOUNTER — OFFICE VISIT (OUTPATIENT)
Dept: PRIMARY CARE CLINIC | Age: 57
End: 2022-04-13
Payer: COMMERCIAL

## 2022-04-13 VITALS
RESPIRATION RATE: 15 BRPM | WEIGHT: 165.4 LBS | HEART RATE: 98 BPM | HEIGHT: 65 IN | BODY MASS INDEX: 27.56 KG/M2 | DIASTOLIC BLOOD PRESSURE: 62 MMHG | OXYGEN SATURATION: 98 % | SYSTOLIC BLOOD PRESSURE: 122 MMHG

## 2022-04-13 DIAGNOSIS — Z13.9 ENCOUNTER FOR SCREENING INVOLVING SOCIAL DETERMINANTS OF HEALTH (SDOH): ICD-10-CM

## 2022-04-13 DIAGNOSIS — R05.9 COUGH: ICD-10-CM

## 2022-04-13 DIAGNOSIS — K59.03 DRUG-INDUCED CONSTIPATION: ICD-10-CM

## 2022-04-13 DIAGNOSIS — C56.9 MALIGNANT NEOPLASM OF OVARY, UNSPECIFIED LATERALITY (HCC): ICD-10-CM

## 2022-04-13 DIAGNOSIS — F32.A DEPRESSION, UNSPECIFIED DEPRESSION TYPE: ICD-10-CM

## 2022-04-13 DIAGNOSIS — G40.909 SEIZURE DISORDER (HCC): ICD-10-CM

## 2022-04-13 DIAGNOSIS — G47.00 INSOMNIA, UNSPECIFIED TYPE: ICD-10-CM

## 2022-04-13 DIAGNOSIS — E11.9 TYPE 2 DIABETES MELLITUS WITHOUT COMPLICATION, WITHOUT LONG-TERM CURRENT USE OF INSULIN (HCC): Primary | ICD-10-CM

## 2022-04-13 DIAGNOSIS — R10.12 LEFT UPPER QUADRANT ABDOMINAL PAIN: ICD-10-CM

## 2022-04-13 LAB — HBA1C MFR BLD: 5.2 %

## 2022-04-13 PROCEDURE — 2022F DILAT RTA XM EVC RTNOPTHY: CPT | Performed by: FAMILY MEDICINE

## 2022-04-13 PROCEDURE — 99214 OFFICE O/P EST MOD 30 MIN: CPT | Performed by: FAMILY MEDICINE

## 2022-04-13 PROCEDURE — 83036 HEMOGLOBIN GLYCOSYLATED A1C: CPT | Performed by: FAMILY MEDICINE

## 2022-04-13 PROCEDURE — 4004F PT TOBACCO SCREEN RCVD TLK: CPT | Performed by: FAMILY MEDICINE

## 2022-04-13 PROCEDURE — 3017F COLORECTAL CA SCREEN DOC REV: CPT | Performed by: FAMILY MEDICINE

## 2022-04-13 PROCEDURE — 3044F HG A1C LEVEL LT 7.0%: CPT | Performed by: FAMILY MEDICINE

## 2022-04-13 PROCEDURE — G8427 DOCREV CUR MEDS BY ELIG CLIN: HCPCS | Performed by: FAMILY MEDICINE

## 2022-04-13 PROCEDURE — G8417 CALC BMI ABV UP PARAM F/U: HCPCS | Performed by: FAMILY MEDICINE

## 2022-04-13 RX ORDER — QUETIAPINE FUMARATE 25 MG/1
TABLET, FILM COATED ORAL
Qty: 30 TABLET | Refills: 0 | Status: SHIPPED | OUTPATIENT
Start: 2022-04-13 | End: 2022-05-09

## 2022-04-13 RX ORDER — QUETIAPINE FUMARATE 50 MG/1
TABLET, FILM COATED ORAL
Qty: 30 TABLET | Refills: 0 | Status: ON HOLD
Start: 2022-04-13 | End: 2022-06-08 | Stop reason: HOSPADM

## 2022-04-13 RX ORDER — AZITHROMYCIN 250 MG/1
250 TABLET, FILM COATED ORAL SEE ADMIN INSTRUCTIONS
Qty: 6 TABLET | Refills: 0 | Status: SHIPPED | OUTPATIENT
Start: 2022-04-13 | End: 2022-04-18

## 2022-04-13 RX ORDER — MORPHINE SULFATE 30 MG/1
30 TABLET, FILM COATED, EXTENDED RELEASE ORAL 2 TIMES DAILY
Qty: 60 TABLET | Refills: 0 | Status: SHIPPED | OUTPATIENT
Start: 2022-04-13 | End: 2022-05-19

## 2022-04-13 RX ORDER — DOCUSATE SODIUM 100 MG/1
100 CAPSULE, LIQUID FILLED ORAL 2 TIMES DAILY
Qty: 60 CAPSULE | Refills: 0 | Status: SHIPPED | OUTPATIENT
Start: 2022-04-13 | End: 2022-05-13

## 2022-04-13 RX ORDER — BENZONATATE 100 MG/1
100 CAPSULE ORAL 3 TIMES DAILY PRN
Qty: 30 CAPSULE | Refills: 1 | Status: SHIPPED | OUTPATIENT
Start: 2022-04-13

## 2022-04-13 RX ORDER — CHOLECALCIFEROL (VITAMIN D3) 125 MCG
5 CAPSULE ORAL DAILY
Qty: 30 TABLET | Refills: 3 | Status: SHIPPED | OUTPATIENT
Start: 2022-04-13

## 2022-04-13 SDOH — ECONOMIC STABILITY: FOOD INSECURITY: WITHIN THE PAST 12 MONTHS, THE FOOD YOU BOUGHT JUST DIDN'T LAST AND YOU DIDN'T HAVE MONEY TO GET MORE.: SOMETIMES TRUE

## 2022-04-13 SDOH — ECONOMIC STABILITY: FOOD INSECURITY: WITHIN THE PAST 12 MONTHS, YOU WORRIED THAT YOUR FOOD WOULD RUN OUT BEFORE YOU GOT MONEY TO BUY MORE.: OFTEN TRUE

## 2022-04-13 ASSESSMENT — PATIENT HEALTH QUESTIONNAIRE - PHQ9
5. POOR APPETITE OR OVEREATING: 2
SUM OF ALL RESPONSES TO PHQ QUESTIONS 1-9: 15
SUM OF ALL RESPONSES TO PHQ9 QUESTIONS 1 & 2: 6
SUM OF ALL RESPONSES TO PHQ QUESTIONS 1-9: 15
8. MOVING OR SPEAKING SO SLOWLY THAT OTHER PEOPLE COULD HAVE NOTICED. OR THE OPPOSITE, BEING SO FIGETY OR RESTLESS THAT YOU HAVE BEEN MOVING AROUND A LOT MORE THAN USUAL: 0
4. FEELING TIRED OR HAVING LITTLE ENERGY: 3
1. LITTLE INTEREST OR PLEASURE IN DOING THINGS: 3
10. IF YOU CHECKED OFF ANY PROBLEMS, HOW DIFFICULT HAVE THESE PROBLEMS MADE IT FOR YOU TO DO YOUR WORK, TAKE CARE OF THINGS AT HOME, OR GET ALONG WITH OTHER PEOPLE: 0
3. TROUBLE FALLING OR STAYING ASLEEP: 3
6. FEELING BAD ABOUT YOURSELF - OR THAT YOU ARE A FAILURE OR HAVE LET YOURSELF OR YOUR FAMILY DOWN: 0
2. FEELING DOWN, DEPRESSED OR HOPELESS: 3
9. THOUGHTS THAT YOU WOULD BE BETTER OFF DEAD, OR OF HURTING YOURSELF: 0
SUM OF ALL RESPONSES TO PHQ QUESTIONS 1-9: 15
7. TROUBLE CONCENTRATING ON THINGS, SUCH AS READING THE NEWSPAPER OR WATCHING TELEVISION: 1
SUM OF ALL RESPONSES TO PHQ QUESTIONS 1-9: 15

## 2022-04-13 ASSESSMENT — SOCIAL DETERMINANTS OF HEALTH (SDOH): HOW HARD IS IT FOR YOU TO PAY FOR THE VERY BASICS LIKE FOOD, HOUSING, MEDICAL CARE, AND HEATING?: SOMEWHAT HARD

## 2022-04-13 NOTE — PROGRESS NOTES
[facility] ([place], [place]) PRIMARY CARE  [address]   [address]  [address] [address]  Dept: [phone]  Dept Fax: [phone]    Prince Lester is a 59 y.o. female who presents today for her medical conditions/complaints as noted below. Prince Lester is c/o of  Chief Complaint   Patient presents with    New Patient     Establish Care; DM       HPI:     HPI   Pt here to establish care with new pcp    She has ovarian cancer and has been following with oncology and getting chemotherapy treatments. She has nausea , zofran helps with this. Does have a left sided upper and lower abdominal discomfort for the past few months. Does correlate a lot to when she is constipated. She has not had a BM for three days. Had ct abd/pelvis 2/22/22 that showed stricturing of mid sigmoid colon with no obstruction  And metastasis noted left inguinal nannette area as well and other areas. She is on pain medication due to her cancer. Diabetic- a1c today is 5.2.- was on metformin in past.   She is feeling down and depressed but denies any suicidal ideation. She is open to seeing a therapist.     Has seizure hx - follows with neurology stable on her medications    Does note a cough for the past week, no shortness of breath or tightness. She also has insomnia as well. Hemoglobin A1C (%)   Date Value   04/13/2022 5.2   03/10/2021 6.0   01/09/2021 6.0             ( goal A1C is < 7)   No results found for: LABMICR  LDL Cholesterol (mg/dL)   Date Value   03/10/2021 69       (goal LDL is <100)   AST (U/L)   Date Value   04/15/2022 25     ALT (U/L)   Date Value   04/15/2022 23     BUN (mg/dL)   Date Value   04/15/2022 8     BP Readings from Last 3 Encounters:   04/15/22 131/76   04/13/22 122/62   04/08/22 115/72          (goal 120/80)    Past Medical History:   Diagnosis Date    Anemia     Bleeding 10/2020    intra-abdominal bleeding -due to splenic mass with GI infiltration.   Status post embolization    [facility] [address]     Depression     Diabetes mellitus ([facility] [address]     GERD (gastroesophageal reflux disease)     Hx of blood clots     Hypertension     Metastatic cancer ([facility] [address] 10/2020    extensive intraabdominal and splenic involvement and lung mets.  Ovarian cancer ([facility] [address]     low grade serous ovarian carcinoma    Post chemo evaluation     2007: Chemo via med onc (Dr. Jim Wood), 2008: Joni Patriot due to rising CA-125, 2013: intraperitoneal chemo,12/2015: Ca125 - 25     Splenic lesion       Past Surgical History:   Procedure Laterality Date    ABSCESS DRAINAGE  2013    Franca rectal    ANUS SURGERY      ANAL FISSURECTOMY    CARDIAC CATHETERIZATION      COLECTOMY  03/2013    ex lap, tumor debulking, transverse colectomy w reanastamosis, subgastric omentectomy, intraperitoneal port placement    HYSTERECTOMY, TOTAL ABDOMINAL      IR EMBOLIZATION HEMORRHAGE  10/05/2020    intra-abdominal bleeding -due to splenic mass with GI infiltration. Status post embolization [employer] interlock coils x7. mri condtional 3t ok, safe immediately post implant.     IR PORT PLACEMENT EQUAL OR GREATER THAN 5 YEARS  08/24/2020    IR PORT PLACEMENT EQUAL OR GREATER THAN 5 YEARS 8/24/2020 Alexia Bonilla MD [facility]    PORT SURGERY      IP Port    TONSILLECTOMY         Family History   Problem Relation Age of Onset    Alcohol Abuse Mother     Cirrhosis Mother        Social History     Tobacco Use    Smoking status: Current Every Day Smoker     Packs/day: 1.00     Years: 20.00     Pack years: 20.00     Types: Cigarettes    Smokeless tobacco: Never Used   Substance Use Topics    Alcohol use: Not Currently      Current Outpatient Medications   Medication Sig Dispense Refill    QUEtiapine (SEROQUEL) 25 MG tablet 1 TABLET ALONG WITH 50MG AT BEDTIME 30 tablet 0    QUEtiapine (SEROQUEL) 50 MG tablet TAKE 1 TABLET BY MOUTH ONCE DAILY AT BEDTIME ALONG WITH 25MG 30 tablet 0    morphine (MS CONTIN) 30 MG extended release tablet TAKE 1 TABLET BY MOUTH 2 TIMES DAILY FOR 30 DAYS. 60 tablet 0    docusate sodium (COLACE) 100 MG capsule Take 1 capsule by mouth 2 times daily 60 capsule 0    melatonin 5 MG TABS tablet Take 1 tablet by mouth daily 30 tablet 3    benzonatate (TESSALON PERLES) 100 MG capsule Take 1 capsule by mouth 3 times daily as needed for Cough 30 capsule 1    LORazepam (ATIVAN) 1 MG tablet Take 1 tablet by mouth every 8 hours as needed for Anxiety for up to 30 doses. 90 tablet 0    apixaban (ELIQUIS) 5 MG TABS tablet Take 1 tablet by mouth 2 times daily 60 tablet 5    sertraline (ZOLOFT) 100 MG tablet TAKE 1 TABLET BY MOUTH DAILY 30 tablet 4    pantoprazole (PROTONIX) 40 MG tablet TAKE 1 TABLET BY MOUTH DAILY 60 tablet 0    ondansetron (ZOFRAN-ODT) 4 MG disintegrating tablet Take 1 tablet by mouth every 8 hours as needed for Nausea or Vomiting 60 tablet 2    levETIRAcetam (KEPPRA) 1000 MG tablet Take 1 tablet by mouth 2 times daily 60 tablet 3    melatonin 5 MG TABS tablet Take 1 tablet by mouth nightly as needed (sleep) 30 tablet 0    hydrocortisone (ANUSOL-HC) 2.5 % CREA rectal cream Apply on affected area twice daily 1 each 1    lamoTRIgine (LAMICTAL) 25 MG tablet Take 5 tablets by mouth 2 times daily 300 tablet 3    ferrous sulfate (IRON 325) 325 (65 Fe) MG tablet Take 1 tablet by mouth daily (with breakfast) 30 tablet 2    oxyCODONE-acetaminophen (PERCOCET) 5-325 MG per tablet TAKE 1 TABLET BY MOUTH EVERY 4 HOURS AS NEEDED FOR PAIN (BREAKTHROUGH PAIN) - REDUCE DOSES TAKEN AS PAIN BECOMES MANAGEABLE 180 tablet 0     No current facility-administered medications for this visit.      Allergies   Allergen Reactions    Ceftriaxone      Had a rash on ceftriaxone December 2020, [address] Maintenance   Topic Date Due    COVID-19 Vaccine (1) Never done    DTaP/Tdap/Td vaccine (1 - Tdap) Never done    Hepatitis B vaccine (2 of 3 - Risk 3-dose series) 01/31/2008    Shingles Vaccine (1 of 2) Never done    Flu vaccine (Season Ended) 09/01/2022    Depression Monitoring  04/13/2023    Pneumococcal 0-64 years Vaccine (4 - PPSV23 or PCV20) 02/24/2028    Hepatitis A vaccine  Aged Out    Hib vaccine  Aged Out    Meningococcal (ACWY) vaccine  Aged Out       Subjective:      Review of Systems   Constitutional: Negative for chills and fever. Cardiovascular: Negative for chest pain. Gastrointestinal: Positive for abdominal pain and nausea. Psychiatric/Behavioral: Positive for dysphoric mood. Negative for suicidal ideas. Objective:     Physical Exam  Constitutional:       Appearance: She is well-developed. HENT:      Head: Normocephalic and atraumatic. Right Ear: External ear normal.      Left Ear: External ear normal.   Eyes:      Conjunctiva/sclera: Conjunctivae normal.      Pupils: Pupils are equal, round, and reactive to light. Cardiovascular:      Rate and Rhythm: Normal rate and regular rhythm. Heart sounds: Normal heart sounds. Pulmonary:      Effort: Pulmonary effort is normal.      Breath sounds: Normal breath sounds. Abdominal:      General: Bowel sounds are normal. There is no distension. Palpations: Abdomen is soft. Tenderness: There is no abdominal tenderness (some  ttp left side of abdomen). Musculoskeletal:      Cervical back: Neck supple. Skin:     General: Skin is warm and dry. Neurological:      Mental Status: She is alert and oriented to person, place, and time. Psychiatric:         Mood and Affect: Mood normal.         Behavior: Behavior normal.         Thought Content: Thought content normal.       /62   Pulse 98   Resp 15   Ht 5' 5\" (1.651 m)   Wt 165 lb 6.4 oz (75 kg)   SpO2 98%   BMI 27.52 kg/m²     Assessment:      1. Type 2 diabetes mellitus without complication, without long-term current use of insulin (HCC)  - A1c good at 5.2   - POCT glycosylated hemoglobin (Hb A1C)    2.  Malignant neoplasm of ovary, unspecified laterality ([address]  - getting chemotherapy, following with oncology. 3. Left upper quadrant abdominal pain  - ongoing intermittent left sided abdominal pain, does have stricture of sigmoid and also has inguinal lymph node mets. - pt notes has been constipated so recommend taking miralax and colace daily to see if improvement due to her being on pain meds. If not improving to let me know. - Celso Rousseau MD, Gastroenterology, [place]    4. Encounter for screening involving social determinants of health (SDoH)  - [facility] Referral for Social Determinants of Health    5. Drug-induced constipation  - docusate sodium (COLACE) 100 MG capsule; Take 1 capsule by mouth 2 times daily  Dispense: 60 capsule; Refill: 0    6. Cough  - azithromycin (ZITHROMAX) 250 MG tablet; Take 1 tablet by mouth See Admin Instructions for 5 days 500mg on day 1 followed by 250mg on days 2 - 5  Dispense: 6 tablet; Refill: 0  - benzonatate (TESSALON PERLES) 100 MG capsule; Take 1 capsule by mouth 3 times daily as needed for Cough  Dispense: 30 capsule; Refill: 1    7. Insomnia, unspecified type  - recommend trial of melatonin  - melatonin 5 MG TABS tablet; Take 1 tablet by mouth daily  Dispense: 30 tablet; Refill: 3    8. Depression, unspecified depression type  - [facility]ULT [facility] Primary Care)    9. Seizure disorder [facility])  - following with neurology. Plan:      Return for follow up. Orders Placed This Encounter   Procedures   [facility] Referral for Social Determinants of Health     Referral Priority:   Routine     Referral Type:    Other     Referral Reason:   Specialty Services Required     Requested Specialty:   [facility] Worker     Number of Visits Requested:   Addy Fleming MD, Gastroenterology, [facility] ADULT SERVICES     Referral Priority:   Routine     Referral Type:   Eval and Treat     Referral Reason:   Specialty Services Required     Referred to Provider:   Rozina Sosa Kurt Ontiveros MD     Requested Specialty:   Gastroenterology     Number of Visits Requested:   [facility])     Referral Priority:   Routine     Referral Type:   Behavioral Health     Referral Reason:   Specialty Services Required     Referred to Provider:   RAHUL Allen     Requested Specialty:   Behavioral Health     Number of Visits Requested:   1    POCT glycosylated hemoglobin (Hb A1C)     Orders Placed This Encounter   Medications    docusate sodium (COLACE) 100 MG capsule     Sig: Take 1 capsule by mouth 2 times daily     Dispense:  60 capsule     Refill:  0    melatonin 5 MG TABS tablet     Sig: Take 1 tablet by mouth daily     Dispense:  30 tablet     Refill:  3    azithromycin (ZITHROMAX) 250 MG tablet     Sig: Take 1 tablet by mouth See Admin Instructions for 5 days 500mg on day 1 followed by 250mg on days 2 - 5     Dispense:  6 tablet     Refill:  0    benzonatate (TESSALON PERLES) 100 MG capsule     Sig: Take 1 capsule by mouth 3 times daily as needed for Cough     Dispense:  30 capsule     Refill:  1        Patient given educational materials - see patient instructions. Discussed use, benefit, and side effects of prescribed medications. All patient questions answered. Pt voiced understanding. Reviewed healthmaintenance. Instructed to continue current medications, diet and exercise. Patient agreed with treatment plan. Follow up as directed.      Electronically signed by Jared Ambrosio DO on 4/21/2022 at 10:54 AM

## 2022-04-14 ENCOUNTER — TELEPHONE (OUTPATIENT)
Dept: FAMILY MEDICINE CLINIC | Age: 57
End: 2022-04-14

## 2022-04-14 NOTE — TELEPHONE ENCOUNTER
Pavel Pinon was contacted by Reji Ferrari, melyssa Alvarez, regarding a Social Determinants of Health referral.     A voicemail message was left with the writer's contact information. First contact attempt.

## 2022-04-15 ENCOUNTER — HOSPITAL ENCOUNTER (OUTPATIENT)
Dept: INFUSION THERAPY | Age: 57
Discharge: HOME | End: 2022-04-15
Payer: COMMERCIAL

## 2022-04-15 VITALS
BODY MASS INDEX: 27.49 KG/M2 | HEART RATE: 93 BPM | TEMPERATURE: 97.4 F | SYSTOLIC BLOOD PRESSURE: 131 MMHG | DIASTOLIC BLOOD PRESSURE: 76 MMHG | WEIGHT: 165.2 LBS | RESPIRATION RATE: 16 BRPM

## 2022-04-15 DIAGNOSIS — C56.9 MALIGNANT NEOPLASM OF OVARY, UNSPECIFIED LATERALITY (HCC): Primary | ICD-10-CM

## 2022-04-15 DIAGNOSIS — C79.51 CANCER, METASTATIC TO BONE (HCC): ICD-10-CM

## 2022-04-15 DIAGNOSIS — Z85.43 HISTORY OF OVARIAN CANCER: ICD-10-CM

## 2022-04-15 DIAGNOSIS — M54.59 INTRACTABLE LOW BACK PAIN: ICD-10-CM

## 2022-04-15 LAB
ABSOLUTE EOS #: 0 K/UL (ref 0–0.4)
ABSOLUTE LYMPH #: 1.54 K/UL (ref 1–4.8)
ABSOLUTE MONO #: 0.14 K/UL (ref 0.1–0.8)
ALBUMIN SERPL-MCNC: 3.4 G/DL (ref 3.5–5.2)
ALBUMIN/GLOBULIN RATIO: 1.4 (ref 1–2.5)
ALP BLD-CCNC: 77 U/L (ref 35–104)
ALT SERPL-CCNC: 23 U/L (ref 5–33)
ANION GAP SERPL CALCULATED.3IONS-SCNC: 11 MMOL/L (ref 9–17)
AST SERPL-CCNC: 25 U/L
BASOPHILS # BLD: 1 % (ref 0–2)
BASOPHILS ABSOLUTE: 0.05 K/UL (ref 0–0.2)
BILIRUB SERPL-MCNC: <0.1 MG/DL (ref 0.3–1.2)
BUN BLDV-MCNC: 8 MG/DL (ref 6–20)
CA 125: 428 U/ML
CALCIUM SERPL-MCNC: 8.6 MG/DL (ref 8.6–10.4)
CHLORIDE BLD-SCNC: 102 MMOL/L (ref 98–107)
CO2: 24 MMOL/L (ref 20–31)
CREAT SERPL-MCNC: 0.44 MG/DL (ref 0.5–0.9)
EOSINOPHILS RELATIVE PERCENT: 0 % (ref 1–4)
GFR AFRICAN AMERICAN: >60 ML/MIN
GFR NON-AFRICAN AMERICAN: >60 ML/MIN
GFR SERPL CREATININE-BSD FRML MDRD: ABNORMAL ML/MIN/{1.73_M2}
GLUCOSE BLD-MCNC: 139 MG/DL (ref 70–99)
HCT VFR BLD CALC: 26.6 % (ref 36–46)
HEMOGLOBIN: 8.6 G/DL (ref 12–16)
LYMPHOCYTES # BLD: 32 % (ref 24–44)
MCH RBC QN AUTO: 29.5 PG (ref 26–34)
MCHC RBC AUTO-ENTMCNC: 32.3 G/DL (ref 31–37)
MCV RBC AUTO: 91.2 FL (ref 80–100)
METAMYELOCYTES ABSOLUTE COUNT: 0.14 K/UL
METAMYELOCYTES: 3 %
MONOCYTES # BLD: 3 % (ref 1–7)
MORPHOLOGY: NORMAL
MYELOCYTES ABSOLUTE COUNT: 0.05 K/UL
MYELOCYTES: 1 %
NUCLEATED RED BLOOD CELLS: 1 PER 100 WBC
PDW BLD-RTO: 17.8 % (ref 12.5–15.4)
PLATELET # BLD: 568 K/UL (ref 140–450)
PMV BLD AUTO: 6.7 FL (ref 6–12)
POTASSIUM SERPL-SCNC: 3.7 MMOL/L (ref 3.7–5.3)
RBC # BLD: 2.92 M/UL (ref 4–5.2)
SEG NEUTROPHILS: 60 % (ref 36–66)
SEGMENTED NEUTROPHILS ABSOLUTE COUNT: 2.88 K/UL (ref 1.8–7.7)
SODIUM BLD-SCNC: 137 MMOL/L (ref 135–144)
TOTAL PROTEIN: 5.9 G/DL (ref 6.4–8.3)
WBC # BLD: 4.8 K/UL (ref 3.5–11)

## 2022-04-15 PROCEDURE — 6360000002 HC RX W HCPCS: Performed by: INTERNAL MEDICINE

## 2022-04-15 PROCEDURE — 36591 DRAW BLOOD OFF VENOUS DEVICE: CPT

## 2022-04-15 PROCEDURE — 96375 TX/PRO/DX INJ NEW DRUG ADDON: CPT

## 2022-04-15 PROCEDURE — 85025 COMPLETE CBC W/AUTO DIFF WBC: CPT

## 2022-04-15 PROCEDURE — 2580000003 HC RX 258: Performed by: INTERNAL MEDICINE

## 2022-04-15 PROCEDURE — 80053 COMPREHEN METABOLIC PANEL: CPT

## 2022-04-15 PROCEDURE — 86304 IMMUNOASSAY TUMOR CA 125: CPT

## 2022-04-15 PROCEDURE — 96413 CHEMO IV INFUSION 1 HR: CPT

## 2022-04-15 RX ORDER — SODIUM CHLORIDE 9 MG/ML
20 INJECTION, SOLUTION INTRAVENOUS ONCE
Status: COMPLETED | OUTPATIENT
Start: 2022-04-15 | End: 2022-04-15

## 2022-04-15 RX ORDER — HEPARIN SODIUM (PORCINE) LOCK FLUSH IV SOLN 100 UNIT/ML 100 UNIT/ML
500 SOLUTION INTRAVENOUS PRN
Status: DISCONTINUED | OUTPATIENT
Start: 2022-04-15 | End: 2022-04-16 | Stop reason: HOSPADM

## 2022-04-15 RX ORDER — SODIUM CHLORIDE 0.9 % (FLUSH) 0.9 %
10 SYRINGE (ML) INJECTION PRN
Status: DISCONTINUED | OUTPATIENT
Start: 2022-04-15 | End: 2022-04-16 | Stop reason: HOSPADM

## 2022-04-15 RX ORDER — DEXAMETHASONE SODIUM PHOSPHATE 4 MG/ML
8 INJECTION, SOLUTION INTRA-ARTICULAR; INTRALESIONAL; INTRAMUSCULAR; INTRAVENOUS; SOFT TISSUE ONCE
Status: COMPLETED | OUTPATIENT
Start: 2022-04-15 | End: 2022-04-15

## 2022-04-15 RX ADMIN — SODIUM CHLORIDE, PRESERVATIVE FREE 10 ML: 5 INJECTION INTRAVENOUS at 11:33

## 2022-04-15 RX ADMIN — SODIUM CHLORIDE 20 ML/HR: 9 INJECTION, SOLUTION INTRAVENOUS at 12:22

## 2022-04-15 RX ADMIN — SODIUM CHLORIDE, PRESERVATIVE FREE 10 ML: 5 INJECTION INTRAVENOUS at 11:34

## 2022-04-15 RX ADMIN — DEXAMETHASONE SODIUM PHOSPHATE 8 MG: 4 INJECTION, SOLUTION INTRAMUSCULAR; INTRAVENOUS at 12:22

## 2022-04-15 RX ADMIN — Medication 500 UNITS: at 13:31

## 2022-04-15 RX ADMIN — SODIUM CHLORIDE, PRESERVATIVE FREE 10 ML: 5 INJECTION INTRAVENOUS at 13:31

## 2022-04-15 RX ADMIN — GEMCITABINE 1800 MG: 38 INJECTION, SOLUTION INTRAVENOUS at 12:49

## 2022-04-15 ASSESSMENT — PAIN DESCRIPTION - LOCATION: LOCATION: BACK

## 2022-04-15 ASSESSMENT — PAIN DESCRIPTION - PAIN TYPE: TYPE: CHRONIC PAIN

## 2022-04-15 NOTE — PROGRESS NOTES
Patient here for C16D8 gemzar. Labs reviewed. Tolerated w/o incident and left in stable condition. Returns 5/6.

## 2022-04-16 NOTE — PROGRESS NOTES
_      Chief Complaint   Patient presents with    Follow-up    Fatigue    Pain     back and side     Other     poor appetite last few days     Constipation     DIAGNOSIS:       Recurrent ovarian cancer. Original diagnosis 2005 with multiple recurrences. Diffuse metastasis. CURRENT THERAPY:         Doxil/ Avastin started 9/18/2020. Avastin was discontinued due to recent GI bleeding. Status post recent vascular embolization  S/p seizure disorder. Gemzar started 2/26/2021. Interrupted due to hospitalization and problem with compliance. Evidence of disease progression on CT scan 2/22/22  Add Carboplatin to Gemzar March 2022      BRIEF CASE HISTORY:      Ms. Leny Roth is a very pleasant 59 y.o. female with history of multiple co morbidities as listed. The patient seen in consultation for ovarian cancer with multiple recurrences. 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 She had surgical debulking and she had systemic treatment with Taxol and carboplatin for 6 cycles. Patient was in remission for about 2 years. She had a relapse in 2007 and treated with topotecan with good results. Patient relapsed again in 2008 and was treated with Taxol carboplatin. After 3 cycles of systemic chemotherapy she had problems with carboplatin so she finished 3 more cycles with Taxol. She did well again for 2 to 3 years until she had a relapse in 2012 and she had intraperitoneal chemotherapy treatment with Taxol. Patient was last seen by her oncologist in 2014 at which time she had relatively stable disease with normal tumor marker and no significant abnormal images. Patient moved to [place] after that and she was lost for oncology follow-ups. Patient was recently evaluated again here in [facility] because of abdominal discomfort.  Re imaging confirmed metastatic relapse. Biopsy confirmed ovarian cancer recurrence. Scan showed extensive intraabdominal and splenic involvement and lung mets. She has no symptoms at the present time. Patient denies smoking or alcohol drinking.l    INTERIM HISTORY:    patient is seen for follow up recurrent ovarian cancer. Started on Gemzar. Tolerated well. No melena or hematochezia. No hematemesis. Currently stable. No recent seizure activities. Labs are stable as well. No severe anemia. No active bleeding. Chemotherapy is tolerated fairly well. PAST MEDICAL HISTORY: has a past medical history of Anemia, Bleeding, Cervical cancer ([facility] [address] Depression, Diabetes mellitus ([facility] [address] GERD (gastroesophageal reflux disease), Hx of blood clots, Hypertension, Metastatic cancer ([facility] [address] Ovarian cancer ([facility] [address] Post chemo evaluation, and Splenic lesion. PAST SURGICAL HISTORY: has a past surgical history that includes Hysterectomy, total abdominal; Port Surgery; Tonsillectomy; IR PORT PLACEMENT > 5 YEARS (08/24/2020); Anus surgery; Abscess Drainage (2013); colectomy (03/2013); IR EMBOLIZATION HEMORRHAGE (10/05/2020); and Cardiac catheterization. CURRENT MEDICATIONS:  has a current medication list which includes the following prescription(s): lorazepam, apixaban, sertraline, oxycodone-acetaminophen, pantoprazole, ondansetron, levetiracetam, melatonin, hydrocortisone, lamotrigine, ferrous sulfate, quetiapine, quetiapine, morphine, docusate sodium, melatonin, azithromycin, and benzonatate. ALLERGIES:  is allergic to ceftriaxone. FAMILY HISTORY: Negative for any hematological or oncological conditions. SOCIAL HISTORY:  reports that she has been smoking cigarettes. She has a 20.00 pack-year smoking history. She has never used smokeless tobacco. She reports previous alcohol use. She reports that she does not use drugs. REVIEW OF SYSTEMS:     · General: No weakness or fatigue. No unanticipated weight loss or decreased appetite.  No fever or chills. · Eyes: No blurred vision, eye pain or double vision. · Ears: No hearing problems or drainage. No tinnitus. · Throat: No sore throat, problems with swallowing or dysphagia. · Respiratory: No cough, sputum or hemoptysis. No shortness of breath. No pleuritic chest pain. · Cardiovascular: No chest pain, orthopnea or PND. No lower extremity edema. No palpitation. · Gastrointestinal: As above. · Genitourinary: No dysuria, hematuria, frequency or urgency. · Musculoskeletal: No muscle aches or pains. No limitation of movement. No back pain. No gait disturbance, No joint complaints. · Dermatologic: No skin rashes or pruritus. No skin lesions or discolorations. · Psychiatric: No depression, anxiety, or stress or signs of schizophrenia. No change in mood or affect. · Hematologic: No history of bleeding tendency. No bruises or ecchymosis. No history of clotting problems. · Infectious disease: No fever, chills or frequent infections. · Endocrine: No polydipsia or polyuria. No temperature intolerance. · Neurologic: No headaches or dizziness. No weakness or numbness of the extremities. No changes in balance, coordination,  memory, mentation, behavior. · Allergic/Immunologic: No nasal congestion or hives. No repeated infections. PHYSICAL EXAM:  The patient is not in acute distress. Vital signs: Blood pressure 115/72, pulse 111, temperature 97.1 °F (36.2 °C), temperature source Temporal, resp. rate 16, weight 170 lb 14.4 oz (77.5 kg).      General appearance - well appearing, not in pain or distress  Mental status - good mood, alert and oriented  Eyes - pupils equal and reactive, extraocular eye movements intact  Ears - bilateral TM's and external ear canals normal  Nose - normal and patent, no erythema, discharge or polyps  Mouth - mucous membranes moist, pharynx normal without lesions  Neck - supple, no significant adenopathy  Lymphatics - no palpable lymphadenopathy, no hepatosplenomegaly  Chest - clear to auscultation, no wheezes, rales or rhonchi, symmetric air entry  Heart - normal rate, regular rhythm, normal S1, S2, no murmurs, rubs, clicks or gallops  Abdomen - soft, nontender, nondistended, no masses or organomegaly  Neurological - alert, oriented, normal speech, no focal findings or movement disorder noted  Musculoskeletal - no joint tenderness, deformity or swelling  Extremities - peripheral pulses normal, no pedal edema, no clubbing or cyanosis  Skin - normal coloration and turgor, no rashes, no suspicious skin lesions noted     Review of Diagnostic data:   Lab Results   Component Value Date    WBC 4.8 04/15/2022    HGB 8.6 (L) 04/15/2022    HCT 26.6 (L) 04/15/2022    MCV 91.2 04/15/2022     (H) 04/15/2022       Chemistry        Component Value Date/Time     04/15/2022 1120    K 3.7 04/15/2022 1120     04/15/2022 1120    CO2 24 04/15/2022 1120    BUN 8 04/15/2022 1120    CREATININE 0.44 (L) 04/15/2022 1120        Component Value Date/Time    CALCIUM 8.6 04/15/2022 1120    ALKPHOS 77 04/15/2022 1120    AST 25 04/15/2022 1120    ALT 23 04/15/2022 1120    BILITOT <0.10 (L) 04/15/2022 1120          Lab Results   Component Value Date     428 (H) 04/15/2022         IMPRESSION:   Recurrent ovarian cancer. Original diagnosis 2005 with multiple recurrences. Diffuse metastasis. Recent active intra-abdominal bleeding due to splenic mass with GI infiltration. Status post embolization  S/p seizure disorder. PLAN:   S/p multiple hospitalization for different problems as above. CT scan showed evidence of progressive disease. Currently patient is relatively stable and tolerating chemotherapy fairly well. Repeated tumor marker showed rising marker. CT scan showed progressive disease. Discussed options. We added Carboplatin. Continue adjusted dose Gemzar. I will continue on treatment until progression or intolerable side effects.   She was referred to psychiatry for management of depression. Patient's questions were answered to the best of her satisfaction and she verbalized full understanding and agreement.

## 2022-04-17 RX ORDER — OXYCODONE HYDROCHLORIDE AND ACETAMINOPHEN 5; 325 MG/1; MG/1
TABLET ORAL
Qty: 180 TABLET | Refills: 0 | Status: SHIPPED | OUTPATIENT
Start: 2022-04-17 | End: 2022-05-12

## 2022-04-18 ENCOUNTER — TELEPHONE (OUTPATIENT)
Dept: FAMILY MEDICINE CLINIC | Age: 57
End: 2022-04-18

## 2022-04-18 RX ORDER — QUETIAPINE FUMARATE 50 MG/1
TABLET, FILM COATED ORAL
Qty: 30 TABLET | Refills: 0 | OUTPATIENT
Start: 2022-04-18

## 2022-04-18 NOTE — TELEPHONE ENCOUNTER
Janna Floyd was contacted  by writer to discuss referral for SDOH related needs. Writer spoke with: Patient and explained the services and assistance that can be provided through the patient navigation program.     Patient agreeable to receiving resources and support from [address] [place] [address]. Discussed the following with the patient:      Needs and background info: Pt states she is severely depressed. Is tearful on the phone. States she lost her youngest son, her oldest son has cancer, and pt is battling ovarian cancer. She is on disability. Oldest son lives with her and has no income. States she thinks she needs to talk to a counselor. CHW sympathized and agreed that is a good idea. [facility] phone number was provided. Pt will also consider counseling through her oncologist's office. Pt states she does not like inconveniencing people who take her to appointments. Is never sure if she will be able to get a ride. CHW will look for transportation services. Is struggling to pay bills. By the time she pays rent or one or two bills she runs out of money. States she is not currently behind on bills because she got a tax refund and used that to catch up. Does even billing for gas and electric. Is not on PIPP or HEAP. States when she was on it years ago, the bill was lowered but the balance just kept building up and eventually she had to pay that balance. CHW explained that currently a PIPP client is only responsible for a small portion of the bill. Is interested in food pantry information. CHW will look up info for pt's zip code. Plan of Care: Referred to the [facility] for mental health counseling - phone number was provided. Referred to [facility] - pt preferred to do that online and website was provided. Pt was advised that this program closes at the end of the month.  Pt will come into office for assistance filling out a HEAP/PIPP application on Thursday 4/21 at 2pm. She was advised of necessary documents to bring. CHW will also provide a sheet with food pantry info when she comes in. Follow up with patient necessary: yes    Follow up with resource organization necessary: yes    Patient has given verbal permission to submit applications on their behalf. N/A    Patient was provided with writer's contact information should they require any additional assistance.        Bernadine Ronquillo

## 2022-04-18 NOTE — TELEPHONE ENCOUNTER
Janna Floyd was contacted by melyssa Miranda, regarding a Social Determinants of Health referral.     A voicemail message was left with the writer's contact information. Second contact attempt.

## 2022-04-21 ENCOUNTER — TELEPHONE (OUTPATIENT)
Dept: FAMILY MEDICINE CLINIC | Age: 57
End: 2022-04-21

## 2022-04-21 ASSESSMENT — ENCOUNTER SYMPTOMS
ABDOMINAL PAIN: 1
NAUSEA: 1

## 2022-04-21 NOTE — TELEPHONE ENCOUNTER
Pt called to reschedule her appointment because she is not feeling well. States she will call back to reschedule.

## 2022-04-26 ENCOUNTER — TELEPHONE (OUTPATIENT)
Dept: FAMILY MEDICINE CLINIC | Age: 57
End: 2022-04-26

## 2022-04-26 NOTE — TELEPHONE ENCOUNTER
Art Lank was contacted by melyssa Powell, regarding a Social Determinants of Health referral.     A voicemail message was left with the writer's contact information. First contact attempt.  for f/u

## 2022-04-28 ENCOUNTER — TELEPHONE (OUTPATIENT)
Dept: FAMILY MEDICINE CLINIC | Age: 57
End: 2022-04-28

## 2022-04-28 NOTE — TELEPHONE ENCOUNTER
Emery Venegas was contacted by melyssa Rosales, regarding a Social Determinants of Health referral.     A message was left with the writer's contact information. Second contact attempt for f/u.

## 2022-05-02 DIAGNOSIS — C56.9 MALIGNANT NEOPLASM OF OVARY, UNSPECIFIED LATERALITY (HCC): Primary | ICD-10-CM

## 2022-05-02 RX ORDER — SENNA PLUS 8.6 MG/1
1 TABLET ORAL 2 TIMES DAILY
Qty: 60 TABLET | Refills: 11 | Status: SHIPPED | OUTPATIENT
Start: 2022-05-02 | End: 2023-05-02

## 2022-05-02 RX ORDER — AZITHROMYCIN 250 MG/1
250 TABLET, FILM COATED ORAL SEE ADMIN INSTRUCTIONS
Qty: 6 TABLET | Refills: 0 | Status: SHIPPED | OUTPATIENT
Start: 2022-05-02 | End: 2022-05-07

## 2022-05-02 NOTE — TELEPHONE ENCOUNTER
Pt c/o upper resp infection symptoms cough, fatigue no fever   Spoke with Dr Samantha Mccoy called in zpack   Pt also c.o constipation x3 days only taking colase with no effect will call in bri   Pt notified and verbalized agreement   Roberto Calabrese RN

## 2022-05-03 ENCOUNTER — HOSPITAL ENCOUNTER (EMERGENCY)
Age: 57
Discharge: HOME | End: 2022-05-03
Attending: EMERGENCY MEDICINE
Payer: COMMERCIAL

## 2022-05-03 ENCOUNTER — APPOINTMENT (OUTPATIENT)
Dept: GENERAL RADIOLOGY | Age: 57
End: 2022-05-03
Payer: COMMERCIAL

## 2022-05-03 VITALS
TEMPERATURE: 98.6 F | RESPIRATION RATE: 18 BRPM | OXYGEN SATURATION: 98 % | HEART RATE: 104 BPM | SYSTOLIC BLOOD PRESSURE: 144 MMHG | DIASTOLIC BLOOD PRESSURE: 74 MMHG

## 2022-05-03 DIAGNOSIS — D64.9 ANEMIA, UNSPECIFIED TYPE: Primary | ICD-10-CM

## 2022-05-03 LAB
ABSOLUTE EOS #: 0.36 K/UL (ref 0–0.4)
ABSOLUTE IMMATURE GRANULOCYTE: 0.18 K/UL (ref 0–0.3)
ABSOLUTE LYMPH #: 1.8 K/UL (ref 1–4.8)
ABSOLUTE MONO #: 0.72 K/UL (ref 0.1–0.8)
ALBUMIN SERPL-MCNC: 3 G/DL (ref 3.5–5.2)
ALBUMIN/GLOBULIN RATIO: 1.7 (ref 1–2.5)
ALP BLD-CCNC: 67 U/L (ref 35–104)
ALT SERPL-CCNC: <5 U/L (ref 5–33)
ANION GAP SERPL CALCULATED.3IONS-SCNC: 7 MMOL/L (ref 9–17)
AST SERPL-CCNC: 8 U/L
BASOPHILS # BLD: 1 % (ref 0–2)
BASOPHILS ABSOLUTE: 0.09 K/UL (ref 0–0.2)
BILIRUB SERPL-MCNC: <0.1 MG/DL (ref 0.3–1.2)
BILIRUBIN URINE: NEGATIVE
BUN BLDV-MCNC: 13 MG/DL (ref 6–20)
CALCIUM SERPL-MCNC: 7.6 MG/DL (ref 8.6–10.4)
CHLORIDE BLD-SCNC: 103 MMOL/L (ref 98–107)
CO2: 25 MMOL/L (ref 20–31)
COLOR: YELLOW
COMMENT UA: ABNORMAL
CREAT SERPL-MCNC: 0.51 MG/DL (ref 0.5–0.9)
EOSINOPHILS RELATIVE PERCENT: 4 % (ref 1–4)
GFR AFRICAN AMERICAN: >60 ML/MIN
GFR NON-AFRICAN AMERICAN: >60 ML/MIN
GFR SERPL CREATININE-BSD FRML MDRD: ABNORMAL ML/MIN/{1.73_M2}
GLUCOSE BLD-MCNC: 103 MG/DL (ref 70–99)
GLUCOSE URINE: NEGATIVE
HCT VFR BLD CALC: 22.4 % (ref 36.3–47.1)
HEMOGLOBIN: 6.5 G/DL (ref 11.9–15.1)
IMMATURE GRANULOCYTES: 2 %
INR BLD: 1.2
KETONES, URINE: ABNORMAL
LEUKOCYTE ESTERASE, URINE: NEGATIVE
LYMPHOCYTES # BLD: 20 % (ref 24–44)
MAGNESIUM: 1.6 MG/DL (ref 1.6–2.6)
MCH RBC QN AUTO: 29.5 PG (ref 25.2–33.5)
MCHC RBC AUTO-ENTMCNC: 29 G/DL (ref 28.4–34.8)
MCV RBC AUTO: 101.8 FL (ref 82.6–102.9)
MONOCYTES # BLD: 8 % (ref 1–7)
MORPHOLOGY: ABNORMAL
NITRITE, URINE: NEGATIVE
NRBC AUTOMATED: 0.2 PER 100 WBC
PARTIAL THROMBOPLASTIN TIME: 19.5 SEC (ref 20.5–30.5)
PDW BLD-RTO: 21.7 % (ref 11.8–14.4)
PH UA: 5.5 (ref 5–8)
PLATELET # BLD: 721 K/UL (ref 138–453)
PMV BLD AUTO: 10.6 FL (ref 8.1–13.5)
POTASSIUM SERPL-SCNC: 3.2 MMOL/L (ref 3.7–5.3)
PROTEIN UA: NEGATIVE
PROTHROMBIN TIME: 12.2 SEC (ref 9.1–12.3)
RBC # BLD: 2.2 M/UL (ref 3.95–5.11)
REASON FOR REJECTION: NORMAL
SEG NEUTROPHILS: 65 % (ref 36–66)
SEGMENTED NEUTROPHILS ABSOLUTE COUNT: 5.85 K/UL (ref 1.8–7.7)
SODIUM BLD-SCNC: 135 MMOL/L (ref 135–144)
SPECIFIC GRAVITY UA: 1.02 (ref 1–1.03)
TOTAL PROTEIN: 4.8 G/DL (ref 6.4–8.3)
TURBIDITY: CLEAR
URINE HGB: NEGATIVE
UROBILINOGEN, URINE: NORMAL
WBC # BLD: 9 K/UL (ref 3.5–11.3)
ZZ NTE CLEAN UP: ORDERED TEST: NORMAL
ZZ NTE WITH NAME CLEAN UP: SPECIMEN SOURCE: NORMAL

## 2022-05-03 PROCEDURE — 86901 BLOOD TYPING SEROLOGIC RH(D): CPT

## 2022-05-03 PROCEDURE — 85025 COMPLETE CBC W/AUTO DIFF WBC: CPT

## 2022-05-03 PROCEDURE — 81003 URINALYSIS AUTO W/O SCOPE: CPT

## 2022-05-03 PROCEDURE — 86870 RBC ANTIBODY IDENTIFICATION: CPT

## 2022-05-03 PROCEDURE — 86880 COOMBS TEST DIRECT: CPT

## 2022-05-03 PROCEDURE — 96361 HYDRATE IV INFUSION ADD-ON: CPT

## 2022-05-03 PROCEDURE — 80053 COMPREHEN METABOLIC PANEL: CPT

## 2022-05-03 PROCEDURE — 96374 THER/PROPH/DIAG INJ IV PUSH: CPT

## 2022-05-03 PROCEDURE — 6360000002 HC RX W HCPCS: Performed by: STUDENT IN AN ORGANIZED HEALTH CARE EDUCATION/TRAINING PROGRAM

## 2022-05-03 PROCEDURE — 85730 THROMBOPLASTIN TIME PARTIAL: CPT

## 2022-05-03 PROCEDURE — 96376 TX/PRO/DX INJ SAME DRUG ADON: CPT

## 2022-05-03 PROCEDURE — 86900 BLOOD TYPING SEROLOGIC ABO: CPT

## 2022-05-03 PROCEDURE — 2580000003 HC RX 258: Performed by: STUDENT IN AN ORGANIZED HEALTH CARE EDUCATION/TRAINING PROGRAM

## 2022-05-03 PROCEDURE — 83735 ASSAY OF MAGNESIUM: CPT

## 2022-05-03 PROCEDURE — 99285 EMERGENCY DEPT VISIT HI MDM: CPT

## 2022-05-03 PROCEDURE — 85610 PROTHROMBIN TIME: CPT

## 2022-05-03 PROCEDURE — 86920 COMPATIBILITY TEST SPIN: CPT

## 2022-05-03 PROCEDURE — 86902 BLOOD TYPE ANTIGEN DONOR EA: CPT

## 2022-05-03 PROCEDURE — 36430 TRANSFUSION BLD/BLD COMPNT: CPT

## 2022-05-03 PROCEDURE — 93005 ELECTROCARDIOGRAM TRACING: CPT | Performed by: STUDENT IN AN ORGANIZED HEALTH CARE EDUCATION/TRAINING PROGRAM

## 2022-05-03 PROCEDURE — P9016 RBC LEUKOCYTES REDUCED: HCPCS

## 2022-05-03 PROCEDURE — 96375 TX/PRO/DX INJ NEW DRUG ADDON: CPT

## 2022-05-03 PROCEDURE — 86850 RBC ANTIBODY SCREEN: CPT

## 2022-05-03 PROCEDURE — 71045 X-RAY EXAM CHEST 1 VIEW: CPT

## 2022-05-03 RX ORDER — 0.9 % SODIUM CHLORIDE 0.9 %
1000 INTRAVENOUS SOLUTION INTRAVENOUS ONCE
Status: COMPLETED | OUTPATIENT
Start: 2022-05-03 | End: 2022-05-03

## 2022-05-03 RX ORDER — SODIUM CHLORIDE 9 MG/ML
INJECTION, SOLUTION INTRAVENOUS PRN
Status: DISCONTINUED | OUTPATIENT
Start: 2022-05-03 | End: 2022-05-03 | Stop reason: HOSPADM

## 2022-05-03 RX ORDER — ONDANSETRON 2 MG/ML
4 INJECTION INTRAMUSCULAR; INTRAVENOUS ONCE
Status: COMPLETED | OUTPATIENT
Start: 2022-05-03 | End: 2022-05-03

## 2022-05-03 RX ADMIN — HYDROMORPHONE HYDROCHLORIDE 1 MG: 1 INJECTION, SOLUTION INTRAMUSCULAR; INTRAVENOUS; SUBCUTANEOUS at 17:32

## 2022-05-03 RX ADMIN — HYDROMORPHONE HYDROCHLORIDE 1 MG: 1 INJECTION, SOLUTION INTRAMUSCULAR; INTRAVENOUS; SUBCUTANEOUS at 19:20

## 2022-05-03 RX ADMIN — SODIUM CHLORIDE 1000 ML: 9 INJECTION, SOLUTION INTRAVENOUS at 17:35

## 2022-05-03 RX ADMIN — ONDANSETRON 4 MG: 2 INJECTION INTRAMUSCULAR; INTRAVENOUS at 17:32

## 2022-05-03 ASSESSMENT — PAIN SCALES - GENERAL
PAINLEVEL_OUTOF10: 8
PAINLEVEL_OUTOF10: 8

## 2022-05-03 NOTE — ED NOTES
Pt to ED via self with c/o generalized weakness. Pt states she felt as if her \"legs were to give out. \" Pt states stage IV ovarian cancer, last chemo tx approx 2 weeks ago. Pt denies cp, sob. C/o 8/10 pain and nausea.  Pt is a/o, ambulatory, RR even and non labored, call light in reach, attached to monitor, son at bedside      Santiago Greenberg RN  05/03/22 1955

## 2022-05-03 NOTE — ED PROVIDER NOTES
[facility]     Emergency Department     Faculty Attestation    I performed a history and physical examination of the patient and discussed management with the resident. I reviewed the residents note and agree with the documented findings and plan of care. Any areas of disagreement are noted on the chart. I was personally present for the key portions of any procedures. I have documented in the chart those procedures where I was not present during the key portions. I have reviewed the emergency nurses triage note. I agree with the chief complaint, past medical history, past surgical history, allergies, medications, social and family history as documented unless otherwise noted below. For Physician Assistant/ Nurse Practitioner cases/documentation I have personally evaluated this patient and have completed at least one if not all key elements of the E/M (history, physical exam, and MDM). Additional findings are as noted. I have personally seen and evaluated the patient. I find the patient's history and physical exam are consistent with the NP/PA documentation. I agree with the care provided, treatment rendered, disposition and follow-up plan. Complains of primarily generalized fatigue abrupt in onset several days ago patient is ongoing chemotherapy for stage IV ovarian cancer describes no abdominal pain chest pain or shortness of breath at the current time resting comfortably      Critical Care     Car Preciado M.D.   Attending Emergency  Physician              Cristhian Delcid MD  05/03/22 2517

## 2022-05-03 NOTE — CONSENT
Informed Consent for Blood Component Transfusion Note    I have discussed with the patient the rationale for blood component transfusion; its benefits in treating or preventing fatigue, organ damage, or death; and its risk which includes mild transfusion reactions, rare risk of blood borne infection, or more serious but rare reactions. I have discussed the alternatives to transfusion, including the risk and consequences of not receiving transfusion. The patient had an opportunity to ask questions and had agreed to proceed with transfusion of blood components.     Electronically signed by Donavan Dakin, DO on 5/3/22 at 6:48 PM EDT

## 2022-05-03 NOTE — ED NOTES
The following labs labeled with pt sticker and tubed to lab:     [] Blue     [] Lavender   [] on ice  [] Green/yellow  [] Green/black [] on ice  [] Yellow  [] Red  [x] Pink      [] COVID-19 swab    [] Rapid  [] PCR  [] Flu swab  [] Peds Viral Panel     [] Urine Sample  [] Pelvic Cultures  [] Blood Cultures            Santiago Greenberg RN  05/03/22 9293

## 2022-05-03 NOTE — ED NOTES
The following labs labeled with pt sticker and tubed to lab:     [x] Blue     [x] Lavender   [] on ice  [x] Green/yellow  [x] Green/black [] on ice  [] Yellow  [] Red  [] Pink      [] COVID-19 swab    [] Rapid  [] PCR  [] Flu swab  [] Peds Viral Panel     [] Urine Sample  [] Pelvic Cultures  [] Blood Cultures            Cris Singletary RN  05/03/22 2988

## 2022-05-03 NOTE — ED NOTES
Pt oxygen saturation 87% with good pleth, placed onto 2L nasal cannula.  Up to [id number] Maicol Cline RN  05/03/22 0007

## 2022-05-04 ENCOUNTER — TELEPHONE (OUTPATIENT)
Dept: INFUSION THERAPY | Age: 57
End: 2022-05-04

## 2022-05-04 LAB
EKG ATRIAL RATE: 106 BPM
EKG P AXIS: 62 DEGREES
EKG P-R INTERVAL: 164 MS
EKG Q-T INTERVAL: 350 MS
EKG QRS DURATION: 80 MS
EKG QTC CALCULATION (BAZETT): 464 MS
EKG R AXIS: 53 DEGREES
EKG T AXIS: 124 DEGREES
EKG VENTRICULAR RATE: 106 BPM

## 2022-05-04 ASSESSMENT — ENCOUNTER SYMPTOMS
ABDOMINAL PAIN: 1
BACK PAIN: 0
COUGH: 0
SHORTNESS OF BREATH: 0
VOMITING: 0
NAUSEA: 0

## 2022-05-04 NOTE — ED PROVIDER NOTES
[facility] ED  Emergency Department Encounter  EmergencyMedicine Resident     Pt Name:Breanna De La Cruz  MRN: [id number]  [place] 1965  Date of evaluation: 5/4/22  PCP:  Marcos Sorto DO    CHIEF COMPLAINT       Chief Complaint   Patient presents with    Fatigue       HISTORY OF PRESENT ILLNESS  (Location/Symptom, Timing/Onset, Context/Setting, Quality, Duration, Modifying Factors, Severity.)      Miguel Plummer is a 59 y.o. female who presents with acute, generalized malaise, decreased p.o. intake and abdominal pain. Patient with history of stage IV ovarian cancer, prior hysterectomy. Of currently on chemotherapy, last treatment approximately 2 weeks ago. Patient concerned that she may be anemic as she has had issues with anemia associated with cancer and has required transfusions in the past.    PAST MEDICAL / SURGICAL / SOCIAL / FAMILY HISTORY      has a past medical history of Anemia, Bleeding, Cervical cancer ([address] Depression, Diabetes mellitus ([address] GERD (gastroesophageal reflux disease), Hx of blood clots, Hypertension, Metastatic cancer ([address] Ovarian cancer ([address] Post chemo evaluation, and Splenic lesion. has a past surgical history that includes Hysterectomy, total abdominal; Port Surgery; Tonsillectomy; IR PORT PLACEMENT > 5 YEARS (08/24/2020); Anus surgery; Abscess Drainage (2013); colectomy (03/2013); IR EMBOLIZATION HEMORRHAGE (10/05/2020); and Cardiac catheterization.       Social History     Socioeconomic History    Marital status: Single     Spouse name: Not on file    Number of children: Not on file    Years of education: Not on file    Highest education level: Not on file   Occupational History    Not on file   Tobacco Use    Smoking status: Current Every Day Smoker     Packs/day: 1.00     Years: 20.00     Pack years: 20.00     Types: Cigarettes    Smokeless tobacco: Never Used   Vaping Use    Vaping Use: Never used   Substance and Sexual Activity  Alcohol use: Not Currently    Drug use: Never    Sexual activity: Not on file   Other Topics Concern    Not on file   Social History Narrative    Not on file     Social Determinants of Health     Financial Resource Strain: Medium Risk    Difficulty of Paying Living Expenses: Somewhat hard   Food Insecurity: Food Insecurity Present    Worried About Running Out of Food in the Last Year: Often true    Cierra of Food in the Last Year: Sometimes true   Transportation Needs:     Lack of Transportation (Medical): Not on file    Lack of Transportation (Non-Medical): Not on file   Physical Activity:     Days of Exercise per Week: Not on file    Minutes of Exercise per Session: Not on file   Stress:     Feeling of Stress : Not on file   Social Connections:     Frequency of Communication with Friends and Family: Not on file    Frequency of Social Gatherings with Friends and Family: Not on file    Attends Cheondoism Services: Not on file    Active Member of [address] [place] [address] or Organizations: Not on file    Attends Club or Organization Meetings: Not on file    Marital Status: Not on file   Intimate Partner Violence:     Fear of Current or Ex-Partner: Not on file    Emotionally Abused: Not on file    Physically Abused: Not on file    Sexually Abused: Not on file   Housing Stability:     Unable to Pay for Housing in the Last Year: Not on file    Number of [place] in the Last Year: Not on file    Unstable Housing in the Last Year: Not on file       Family History   Problem Relation Age of Onset    Alcohol Abuse Mother     Cirrhosis Mother        Allergies:  Ceftriaxone    Home Medications:  Prior to Admission medications    Medication Sig Start Date End Date Taking?  Authorizing Provider   azithromycin (ZITHROMAX) 250 MG tablet Take 1 tablet by mouth See Admin Instructions for 5 days 500mg on day 1 followed by 250mg on days 2 - 5 5/2/22 5/7/22  MD bri Galvan (SENOKOT) 8.6 MG tablet Take 1 tablet by mouth 2 times daily 5/2/22 5/2/23  Margot Arenas MD   oxyCODONE-acetaminophen (PERCOCET) 5-325 MG per tablet TAKE 1 TABLET BY MOUTH EVERY 4 HOURS AS NEEDED FOR PAIN (BREAKTHROUGH PAIN) - REDUCE DOSES TAKEN AS PAIN BECOMES MANAGEABLE 4/17/22 5/17/22  Jeffery Lopez MD   QUEtiapine (SEROQUEL) 25 MG tablet 1 TABLET ALONG WITH 50MG AT BEDTIME 4/13/22   Sandy Bhatia MD   QUEtiapine (SEROQUEL) 50 MG tablet TAKE 1 TABLET BY MOUTH ONCE DAILY AT BEDTIME ALONG WITH 25MG 4/13/22   Sandy Bhatia MD   morphine (MS CONTIN) 30 MG extended release tablet TAKE 1 TABLET BY MOUTH 2 TIMES DAILY FOR 30 DAYS. 4/13/22 5/13/22  Jeffery Lopez MD   docusate sodium (COLACE) 100 MG capsule Take 1 capsule by mouth 2 times daily 4/13/22 5/13/22  Shanda Medhkour, DO   melatonin 5 MG TABS tablet Take 1 tablet by mouth daily 4/13/22   Shanda Medhbaronur, DO   benzonatate (TESSALON PERLES) 100 MG capsule Take 1 capsule by mouth 3 times daily as needed for Cough 4/13/22   Shanda Medhkour, DO   LORazepam (ATIVAN) 1 MG tablet Take 1 tablet by mouth every 8 hours as needed for Anxiety for up to 30 doses.  4/8/22 5/20/22  Jeffery Lopez MD   apixaban (ELIQUIS) 5 MG TABS tablet Take 1 tablet by mouth 2 times daily 4/8/22   Jeffery Lopez MD   sertraline (ZOLOFT) 100 MG tablet TAKE 1 TABLET BY MOUTH DAILY 3/21/22 4/20/22  Sandy Bhatia MD   pantoprazole (PROTONIX) 40 MG tablet TAKE 1 TABLET BY MOUTH DAILY 3/17/22   Jeffery Lopez MD   ondansetron (ZOFRAN-ODT) 4 MG disintegrating tablet Take 1 tablet by mouth every 8 hours as needed for Nausea or Vomiting 3/9/22   Jeffery Lopez MD   levETIRAcetam (KEPPRA) 1000 MG tablet Take 1 tablet by mouth 2 times daily 2/28/22   Miri Springer MD   melatonin 5 MG TABS tablet Take 1 tablet by mouth nightly as needed (sleep) 2/28/22 4/13/22  Miri Springer MD   hydrocortisone (ANUSOL-HC) 2.5 % CREA rectal cream Apply on affected area twice daily 2/28/22   Miri Springer MD lamoTRIgine (LAMICTAL) 25 MG tablet Take 5 tablets by mouth 2 times daily 2/28/22   Leslie Lora MD   ferrous sulfate (IRON 325) 325 (65 Fe) MG tablet Take 1 tablet by mouth daily (with breakfast) 10/31/21   Umesh Childers MD       REVIEW OF SYSTEMS    (2-9 systems for level 4, 10 or more for level 5)      Review of Systems   Constitutional: Positive for appetite change and fatigue. Negative for chills and fever. Respiratory: Negative for cough and shortness of breath. Cardiovascular: Negative for chest pain. Gastrointestinal: Positive for abdominal pain. Negative for nausea and vomiting. Genitourinary: Negative for dysuria, flank pain and urgency. Musculoskeletal: Negative for back pain. Skin: Negative for rash and wound. Neurological: Negative for weakness, light-headedness and headaches. Psychiatric/Behavioral: Negative for confusion. PHYSICAL EXAM   (up to 7 for level 4, 8 or more for level 5)      INITIAL VITALS:   BP (!) 144/74   Pulse 104   Temp 98.6 °F (37 °C) (Oral)   Resp 18   SpO2 98%     Physical Exam  Constitutional:       General: She is not in acute distress. Appearance: She is not toxic-appearing. HENT:      Head: Normocephalic and atraumatic. Cardiovascular:      Rate and Rhythm: Normal rate. Heart sounds: No murmur heard. No friction rub. No gallop. Pulmonary:      Breath sounds: No wheezing, rhonchi or rales. Abdominal:      General: There is no distension. Tenderness: There is abdominal tenderness (generalized). There is no guarding or rebound. Musculoskeletal:      Right lower leg: No edema. Left lower leg: No edema. Skin:     Findings: No bruising, lesion or rash. Neurological:      Mental Status: She is alert.       Gait: Gait normal.         DIFFERENTIAL  DIAGNOSIS     PLAN (LABS / IMAGING / EKG):  Orders Placed This Encounter   Procedures    XR CHEST PORTABLE    CBC with Auto Differential    Protime-INR    APTT    Urinalysis with Reflex to Culture    SPECIMEN REJECTION    Comprehensive Metabolic Panel w/ Reflex to MG    PREVIOUS SPECIMEN    Magnesium    EKG 12 Lead    TYPE AND SCREEN    PREPARE RBC (CROSSMATCH), 1 Units       MEDICATIONS ORDERED:  Orders Placed This Encounter   Medications    HYDROmorphone (DILAUDID) injection 1 mg    ondansetron (ZOFRAN) injection 4 mg    0.9 % sodium chloride bolus    DISCONTD: 0.9 % sodium chloride infusion    HYDROmorphone (DILAUDID) injection 1 mg       DDX: Anemia, dehydration, electrolyte abnormality, UTI, chemotherapy side effect    DIAGNOSTIC RESULTS / EMERGENCY DEPARTMENT COURSE / MDM   LAB RESULTS:  Results for orders placed or performed during the hospital encounter of 05/03/22   CBC with Auto Differential   Result Value Ref Range    WBC 9.0 3.5 - 11.3 k/uL    RBC 2.20 (L) 3.95 - 5.11 m/uL    Hemoglobin 6.5 (LL) 11.9 - 15.1 g/dL    Hematocrit 22.4 (L) 36.3 - 47.1 %    .8 82.6 - 102.9 fL    MCH 29.5 25.2 - 33.5 pg    MCHC 29.0 28.4 - 34.8 g/dL    RDW 21.7 (H) 11.8 - 14.4 %    Platelets 447 (H) 640 - 453 k/uL    MPV 10.6 8.1 - 13.5 fL    NRBC Automated 0.2 (H) 0.0 per 100 WBC    Immature Granulocytes 2 (H) 0 %    Seg Neutrophils 65 36 - 66 %    Lymphocytes 20 (L) 24 - 44 %    Monocytes 8 (H) 1 - 7 %    Eosinophils % 4 1 - 4 %    Basophils 1 0 - 2 %    Absolute Immature Granulocyte 0.18 0.00 - 0.30 k/uL    Segs Absolute 5.85 1.8 - 7.7 k/uL    Absolute Lymph # 1.80 1.0 - 4.8 k/uL    Absolute Mono # 0.72 0.1 - 0.8 k/uL    Absolute Eos # 0.36 0.0 - 0.4 k/uL    Basophils Absolute 0.09 0.0 - 0.2 k/uL    Morphology ANISOCYTOSIS PRESENT     Morphology 1+ POLYCHROMASIA     Morphology 1+ TARGET CELLS     Morphology 1+ ELLIPTOCYTES    Protime-INR   Result Value Ref Range    Protime 12.2 9.1 - 12.3 sec    INR 1.2    APTT   Result Value Ref Range    PTT 19.5 (L) 20.5 - 30.5 sec   Urinalysis with Reflex to Culture    Specimen: Urine   Result Value Ref Range    Color, UA Yellow Yellow Turbidity UA Clear Clear    Glucose, Ur NEGATIVE NEGATIVE    Bilirubin Urine NEGATIVE NEGATIVE    Ketones, Urine TRACE (A) NEGATIVE    Specific Gravity, UA 1.019 1.005 - 1.030    Urine Hgb NEGATIVE NEGATIVE    pH, UA 5.5 5.0 - 8.0    Protein, UA NEGATIVE NEGATIVE    Urobilinogen, Urine Normal Normal    Nitrite, Urine NEGATIVE NEGATIVE    Leukocyte Esterase, Urine NEGATIVE NEGATIVE    Urinalysis Comments       Microscopic exam not performed based on chemical results unless requested in original order. SPECIMEN REJECTION   Result Value Ref Range    Specimen Source . BLOOD     Ordered Test  CMPX     Reason for Rejection Unable to perform testing: Specimen hemolyzed.     Comprehensive Metabolic Panel w/ Reflex to MG   Result Value Ref Range    Glucose 103 (H) 70 - 99 mg/dL    BUN 13 6 - 20 mg/dL    CREATININE 0.51 0.50 - 0.90 mg/dL    Calcium 7.6 (L) 8.6 - 10.4 mg/dL    Sodium 135 135 - 144 mmol/L    Potassium 3.2 (L) 3.7 - 5.3 mmol/L    Chloride 103 98 - 107 mmol/L    CO2 25 20 - 31 mmol/L    Anion Gap 7 (L) 9 - 17 mmol/L    Alkaline Phosphatase 67 35 - 104 U/L    ALT <5 (L) 5 - 33 U/L    AST 8 <32 U/L    Total Bilirubin <0.10 (L) 0.3 - 1.2 mg/dL    Total Protein 4.8 (L) 6.4 - 8.3 g/dL    Albumin 3.0 (L) 3.5 - 5.2 g/dL    Albumin/Globulin Ratio 1.7 1.0 - 2.5    GFR Non-African American >60 >60 mL/min    GFR African American >60 >60 mL/min    GFR Comment         Magnesium   Result Value Ref Range    Magnesium 1.6 1.6 - 2.6 mg/dL   TYPE AND SCREEN   Result Value Ref Range    Expiration Date 05/06/2022,2359     Arm Band Number [id number]     ABO/Rh A POSITIVE     Antibody Screen NEGATIVE     Antibody ID Anti-Palmer Present     AMBER IgG NEGATIVE     Unit Number [id number]     Product Code Leukocyte Reduced Red Cell     Unit Divison 00     Dispense Status ISSUED     Unit Issue Date/Time 800589883324     Product Code Blood Bank B5110A34     Blood Bank Unit Type and Rh O NEG     Blood Bank ISBT Product Blood Type 2530 Blood Bank Blood Product Expiration Date [id number]     Transfusion Status OK TO TRANSFUSE     Crossmatch Result COMPATIBLE     Unit Number [id number]     Product Code Leukocyte Reduced Red Cell     Unit Divison 00     Dispense Status ALLOCATED     Transfusion Status OK TO TRANSFUSE     Crossmatch Result COMPATIBLE        IMPRESSION/ ED Course: 13-year-old female who is chronically ill-appearing but in no acute distress presenting with generalized fatigue. Patient was noted by history of stage IV ovarian cancer. Labs notable for anemia with hemoglobin of 6.5. Labs otherwise unremarkable. Chest x-ray with improvement in appearance compared to prior chest x-ray. Patient was tachycardic on arrival improved fatigue. Hemodynamically stable though with normal blood pressure. Patient agreeable to transfusion 1 unit PRBC. Patient transfused in emergency department. On reevaluation stating that she feels improved and would like to be discharged home. States she has follow-up with her oncologist later this week. She is given ED return precautions, follow-up instructions. She verbalized understanding of and agreement with the discharge plan    RADIOLOGY:  XR CHEST PORTABLE    Result Date: 5/3/2022  EXAMINATION: ONE XRAY VIEW OF THE CHEST 5/3/2022 2:05 pm COMPARISON: 02/26/2022 HISTORY: ORDERING SYSTEM PROVIDED HISTORY: Stage 4 ovarian cancer w/ lung mets. Tachycardia TECHNOLOGIST PROVIDED HISTORY: Stage 4 ovarian cancer w/ lung mets. Tachycardia FINDINGS: Chronic elevation of the right hemidiaphragm. Stable right sided brayden catheter. .The cardiac size is enlarged. Improved aeration. No acute infiltrates or pleural effusions are seen. Pulmonary vascularity appears stable. There is mild ectasia of the thoracic aorta. . No acute bony abnormalities.   No other significant changes are seen     Improved aeration compared to the prior exam.  No acute findings       CONSULTS:  None    CRITICAL CARE:  Please see attending note    FINAL IMPRESSION      1.  Anemia, unspecified type          DISPOSITION / PLAN     DISPOSITION Decision To Discharge 05/03/2022 08:47:17 PM      PATIENT REFERRED TO:  Heydi Arteaga, [address] [address] [email or web address]  [phone]    Schedule an appointment as soon as possible for a visit in 3 days  For re-evaluation      DISCHARGE MEDICATIONS:  Discharge Medication List as of 5/3/2022  8:48 PM          Jocelyne Weiss DO  Emergency Medicine Resident    (Please note that portions of thisnote were completed with a voice recognition program.  Efforts were made to edit the dictations but occasionally words are mis-transcribed.)        Jocelyne Weiss DO  Resident  05/04/22 0607

## 2022-05-04 NOTE — ED NOTES
The following labs labeled with pt sticker and tubed to lab:     [] Blue     [] Lavender   [] on ice  [] Green/yellow  [] Green/black [] on ice  [] Yellow  [] Red  [] Pink      [] COVID-19 swab    [] Rapid  [] PCR  [] Flu swab  [] Peds Viral Panel     [x] Urine Sample  [] Pelvic Cultures  [] Blood Cultures            Laly Schofield RN  05/03/22 8115

## 2022-05-04 NOTE — TELEPHONE ENCOUNTER
Received email from pt as well as a phone call from pt's daughter, Moises Leyva, stating pt was in the ER yesterday for fatigue/weakness, and they gave her 1 unit PRBC. Moises Leyva states they did not recheck her hgb, and just sent her home. She states pt still does not feel well, she is very weak, fatigued, and her BP and HR are elevated. They would like pt to have a blood transfusion. Writer attempted to call Moises Leyva back, but no answer. Writer called pt directly and offered port draw with possible transfusion at 0930 today. She refused stating she cannot come in at that time, and she will maybe come in later. Writer states blood may not be administered if she comes in later than 0930 and pt verbalized understanding. She states she will come in at her convenience and have a peripheral draw, and if she needs blood, she can come in another day for it. Will wait to see if pt comes in for lab draw.

## 2022-05-05 DIAGNOSIS — C56.9 MALIGNANT NEOPLASM OF OVARY, UNSPECIFIED LATERALITY (HCC): Primary | ICD-10-CM

## 2022-05-05 DIAGNOSIS — C79.51 CANCER, METASTATIC TO BONE (HCC): ICD-10-CM

## 2022-05-05 LAB
ABO/RH: NORMAL
ANTIBODY IDENTIFICATION: NORMAL
ANTIBODY SCREEN: NEGATIVE
ARM BAND NUMBER: NORMAL
BLD PROD TYP BPU: NORMAL
BLD PROD TYP BPU: NORMAL
BLOOD BANK BLOOD PRODUCT EXPIRATION DATE: NORMAL
BLOOD BANK ISBT PRODUCT BLOOD TYPE: 9500
BLOOD BANK PRODUCT CODE: NORMAL
BLOOD BANK UNIT TYPE AND RH: NORMAL
BPU ID: NORMAL
BPU ID: NORMAL
CROSSMATCH RESULT: NORMAL
CROSSMATCH RESULT: NORMAL
DAT IGG: NEGATIVE
DISPENSE STATUS BLOOD BANK: NORMAL
DISPENSE STATUS BLOOD BANK: NORMAL
EXPIRATION DATE: NORMAL
TRANSFUSION STATUS: NORMAL
TRANSFUSION STATUS: NORMAL
UNIT DIVISION: 0
UNIT DIVISION: 0
UNIT ISSUE DATE/TIME: NORMAL

## 2022-05-06 ENCOUNTER — HOSPITAL ENCOUNTER (OUTPATIENT)
Dept: INFUSION THERAPY | Age: 57
Discharge: HOME | End: 2022-05-06
Payer: COMMERCIAL

## 2022-05-06 ENCOUNTER — TELEPHONE (OUTPATIENT)
Dept: ONCOLOGY | Age: 57
End: 2022-05-06

## 2022-05-06 VITALS
WEIGHT: 168.5 LBS | DIASTOLIC BLOOD PRESSURE: 63 MMHG | SYSTOLIC BLOOD PRESSURE: 122 MMHG | BODY MASS INDEX: 28.04 KG/M2 | TEMPERATURE: 98.2 F | HEART RATE: 109 BPM

## 2022-05-06 DIAGNOSIS — C56.9 MALIGNANT NEOPLASM OF OVARY, UNSPECIFIED LATERALITY (HCC): Primary | ICD-10-CM

## 2022-05-06 DIAGNOSIS — Z85.43 HISTORY OF OVARIAN CANCER: ICD-10-CM

## 2022-05-06 DIAGNOSIS — C79.51 CANCER, METASTATIC TO BONE (HCC): ICD-10-CM

## 2022-05-06 LAB
ABSOLUTE EOS #: 0.67 K/UL (ref 0–0.4)
ABSOLUTE LYMPH #: 0.76 K/UL (ref 1–4.8)
ABSOLUTE MONO #: 1.43 K/UL (ref 0.1–0.8)
ALBUMIN SERPL-MCNC: 3 G/DL (ref 3.5–5.2)
ALBUMIN/GLOBULIN RATIO: 1.4 (ref 1–2.5)
ALP BLD-CCNC: 72 U/L (ref 35–104)
ALT SERPL-CCNC: <5 U/L (ref 5–33)
ANION GAP SERPL CALCULATED.3IONS-SCNC: 9 MMOL/L (ref 9–17)
AST SERPL-CCNC: 9 U/L
BASOPHILS # BLD: 0 % (ref 0–2)
BASOPHILS ABSOLUTE: 0 K/UL (ref 0–0.2)
BILIRUB SERPL-MCNC: 0.12 MG/DL (ref 0.3–1.2)
BUN BLDV-MCNC: 12 MG/DL (ref 6–20)
CA 125: 350 U/ML
CALCIUM SERPL-MCNC: 8.1 MG/DL (ref 8.6–10.4)
CHLORIDE BLD-SCNC: 110 MMOL/L (ref 98–107)
CO2: 26 MMOL/L (ref 20–31)
CREAT SERPL-MCNC: 0.5 MG/DL (ref 0.5–0.9)
EOSINOPHILS RELATIVE PERCENT: 8 % (ref 1–4)
GFR AFRICAN AMERICAN: >60 ML/MIN
GFR NON-AFRICAN AMERICAN: >60 ML/MIN
GFR SERPL CREATININE-BSD FRML MDRD: ABNORMAL ML/MIN/{1.73_M2}
GLUCOSE BLD-MCNC: 116 MG/DL (ref 70–99)
HCT VFR BLD CALC: 25.5 % (ref 36–46)
HEMOGLOBIN: 7.9 G/DL (ref 12–16)
LYMPHOCYTES # BLD: 9 % (ref 24–44)
MCH RBC QN AUTO: 29.7 PG (ref 26–34)
MCHC RBC AUTO-ENTMCNC: 31 G/DL (ref 31–37)
MCV RBC AUTO: 95.7 FL (ref 80–100)
MONOCYTES # BLD: 17 % (ref 1–7)
MORPHOLOGY: ABNORMAL
NUCLEATED RED BLOOD CELLS: 1 PER 100 WBC
PDW BLD-RTO: 19.9 % (ref 12.5–15.4)
PLATELET # BLD: 904 K/UL (ref 140–450)
PMV BLD AUTO: 7 FL (ref 6–12)
POTASSIUM SERPL-SCNC: 3.2 MMOL/L (ref 3.7–5.3)
RBC # BLD: 2.66 M/UL (ref 4–5.2)
SEG NEUTROPHILS: 66 % (ref 36–66)
SEGMENTED NEUTROPHILS ABSOLUTE COUNT: 5.54 K/UL (ref 1.8–7.7)
SODIUM BLD-SCNC: 145 MMOL/L (ref 135–144)
TOTAL PROTEIN: 5.1 G/DL (ref 6.4–8.3)
WBC # BLD: 8.4 K/UL (ref 3.5–11)

## 2022-05-06 PROCEDURE — 96375 TX/PRO/DX INJ NEW DRUG ADDON: CPT

## 2022-05-06 PROCEDURE — 96417 CHEMO IV INFUS EACH ADDL SEQ: CPT

## 2022-05-06 PROCEDURE — 96365 THER/PROPH/DIAG IV INF INIT: CPT

## 2022-05-06 PROCEDURE — A4216 STERILE WATER/SALINE, 10 ML: HCPCS | Performed by: INTERNAL MEDICINE

## 2022-05-06 PROCEDURE — 80053 COMPREHEN METABOLIC PANEL: CPT

## 2022-05-06 PROCEDURE — 96367 TX/PROPH/DG ADDL SEQ IV INF: CPT

## 2022-05-06 PROCEDURE — 2580000003 HC RX 258: Performed by: INTERNAL MEDICINE

## 2022-05-06 PROCEDURE — 86304 IMMUNOASSAY TUMOR CA 125: CPT

## 2022-05-06 PROCEDURE — 6370000000 HC RX 637 (ALT 250 FOR IP): Performed by: INTERNAL MEDICINE

## 2022-05-06 PROCEDURE — 96413 CHEMO IV INFUSION 1 HR: CPT

## 2022-05-06 PROCEDURE — 85025 COMPLETE CBC W/AUTO DIFF WBC: CPT

## 2022-05-06 PROCEDURE — 6360000002 HC RX W HCPCS: Performed by: INTERNAL MEDICINE

## 2022-05-06 RX ORDER — DEXAMETHASONE SODIUM PHOSPHATE 10 MG/ML
10 INJECTION INTRAMUSCULAR; INTRAVENOUS ONCE
Status: COMPLETED | OUTPATIENT
Start: 2022-05-06 | End: 2022-05-06

## 2022-05-06 RX ORDER — PALONOSETRON 0.05 MG/ML
0.25 INJECTION, SOLUTION INTRAVENOUS ONCE
Status: COMPLETED | OUTPATIENT
Start: 2022-05-06 | End: 2022-05-06

## 2022-05-06 RX ORDER — POTASSIUM CHLORIDE 20 MEQ/1
40 TABLET, EXTENDED RELEASE ORAL ONCE
Status: COMPLETED | OUTPATIENT
Start: 2022-05-06 | End: 2022-05-06

## 2022-05-06 RX ORDER — SODIUM CHLORIDE 9 MG/ML
INJECTION, SOLUTION INTRAVENOUS CONTINUOUS
Status: CANCELLED | OUTPATIENT
Start: 2022-05-06

## 2022-05-06 RX ORDER — SODIUM CHLORIDE 0.9 % (FLUSH) 0.9 %
5 SYRINGE (ML) INJECTION PRN
Status: CANCELLED | OUTPATIENT
Start: 2022-05-06

## 2022-05-06 RX ORDER — SODIUM CHLORIDE 9 MG/ML
20 INJECTION, SOLUTION INTRAVENOUS ONCE
Status: DISCONTINUED | OUTPATIENT
Start: 2022-05-06 | End: 2022-05-07 | Stop reason: HOSPADM

## 2022-05-06 RX ORDER — SODIUM CHLORIDE 0.9 % (FLUSH) 0.9 %
10 SYRINGE (ML) INJECTION PRN
Status: DISCONTINUED | OUTPATIENT
Start: 2022-05-06 | End: 2022-05-07 | Stop reason: HOSPADM

## 2022-05-06 RX ORDER — FAMOTIDINE 10 MG/ML
20 INJECTION, SOLUTION INTRAVENOUS ONCE
Status: CANCELLED | OUTPATIENT
Start: 2022-05-06 | End: 2022-05-06

## 2022-05-06 RX ORDER — HEPARIN SODIUM (PORCINE) LOCK FLUSH IV SOLN 100 UNIT/ML 100 UNIT/ML
500 SOLUTION INTRAVENOUS PRN
Status: DISCONTINUED | OUTPATIENT
Start: 2022-05-06 | End: 2022-05-07 | Stop reason: HOSPADM

## 2022-05-06 RX ORDER — PROCHLORPERAZINE EDISYLATE 5 MG/ML
10 INJECTION INTRAMUSCULAR; INTRAVENOUS ONCE
Status: COMPLETED | OUTPATIENT
Start: 2022-05-06 | End: 2022-05-06

## 2022-05-06 RX ORDER — DIPHENHYDRAMINE HYDROCHLORIDE 50 MG/ML
50 INJECTION INTRAMUSCULAR; INTRAVENOUS ONCE
Status: CANCELLED | OUTPATIENT
Start: 2022-05-06 | End: 2022-05-06

## 2022-05-06 RX ORDER — METHYLPREDNISOLONE SODIUM SUCCINATE 125 MG/2ML
125 INJECTION, POWDER, LYOPHILIZED, FOR SOLUTION INTRAMUSCULAR; INTRAVENOUS ONCE
Status: CANCELLED | OUTPATIENT
Start: 2022-05-06 | End: 2022-05-06

## 2022-05-06 RX ORDER — EPINEPHRINE 1 MG/ML
0.3 INJECTION, SOLUTION, CONCENTRATE INTRAVENOUS PRN
Status: CANCELLED | OUTPATIENT
Start: 2022-05-06

## 2022-05-06 RX ADMIN — POTASSIUM CHLORIDE 40 MEQ: 1500 TABLET, EXTENDED RELEASE ORAL at 15:47

## 2022-05-06 RX ADMIN — GEMCITABINE 1800 MG: 38 INJECTION, SOLUTION INTRAVENOUS at 14:06

## 2022-05-06 RX ADMIN — PALONOSETRON 0.25 MG: 0.25 INJECTION, SOLUTION INTRAVENOUS at 12:56

## 2022-05-06 RX ADMIN — PROCHLORPERAZINE EDISYLATE 10 MG: 5 INJECTION INTRAMUSCULAR; INTRAVENOUS at 15:22

## 2022-05-06 RX ADMIN — SODIUM CHLORIDE, PRESERVATIVE FREE 10 ML: 5 INJECTION INTRAVENOUS at 16:05

## 2022-05-06 RX ADMIN — DEXAMETHASONE SODIUM PHOSPHATE 10 MG: 10 INJECTION INTRAMUSCULAR; INTRAVENOUS at 12:56

## 2022-05-06 RX ADMIN — HEPARIN 500 UNITS: 100 SYRINGE at 16:05

## 2022-05-06 RX ADMIN — SODIUM CHLORIDE 100 ML/HR: 9 INJECTION, SOLUTION INTRAVENOUS at 12:55

## 2022-05-06 RX ADMIN — FOSAPREPITANT 150 MG: 150 INJECTION, POWDER, LYOPHILIZED, FOR SOLUTION INTRAVENOUS at 13:12

## 2022-05-06 RX ADMIN — CARBOPLATIN 450 MG: 10 INJECTION INTRAVENOUS at 14:47

## 2022-05-06 ASSESSMENT — PAIN DESCRIPTION - LOCATION: LOCATION: ABDOMEN;BACK

## 2022-05-06 ASSESSMENT — PAIN SCALES - GENERAL: PAINLEVEL_OUTOF10: 9

## 2022-05-06 NOTE — ONCOLOGY
Patient arrived for C17D1 gemzar/carbo. Labs reviewed and discussed with Dr. Caridad Harris. Dr. Caridad Harris said to proceed with treatment today and have pt scheduled for follow up visit to change chemo plan. Treatment completed without issue. Pt stable at discharge. Scheduled to return 5/13/22.

## 2022-05-06 NOTE — TELEPHONE ENCOUNTER
Name: [facility]  : 1965  MRN: [id number]    Oncology Navigation Follow-Up Note    Contact Type:  Medical Oncology  Notes: Pt @ [facility] for tx. Met with pt in infusion area. Pt c/o weakness, stated seen in ER recently, & received blood transfusion. Pt stated awaits [facility] infusion nurse to review lab work w/Dr. Racheal Mancini & proceed w/tx if okayed. Pt denied questions/concerns. Instructed pt may contact writer prn. Will continue to follow.     Electronically signed by Ramiro Manjarrez RN on 2022 at 12:55 PM

## 2022-05-06 NOTE — FLOWSHEET NOTE
SPIRITUAL CARE PROGRESS NOTE: Outpatient Oncology Care at [facility] farzana Griffin    Spiritual Assessment: Ms. Audria Brunner was in the treatment cubicle of the infusion clinic. Her eyes were closed. She opened them and greeted writer. She responded to writer's questions with brief answers. She told writer she was tired and trying to rest.    Intervention: Writer provided supportive presence and active listening; inquired about Pt's coping and needs; offered words of support and encouragement; assured Pt of her prayers for her and her son. Outcome: Ms. Audria Brunner thanked writer for the visit. Plan: Chaplains will remain available to provide emotional and spiritual support as needed. 05/06/22 1532   Encounter Summary   Encounter Overview/Reason  Spiritual/Emotional Needs   Service Provided For: Patient   Referral/Consult From: [address] Family members; Children   Last Encounter  01/28/22   Complexity of Encounter Low   Begin Time 1500   End Time  1510   Total Time Calculated 10 min   Encounter    Type Follow up   Spiritual/Emotional needs   Type Spiritual Support   Assessment/Intervention/Outcome   Assessment Calm   Intervention Active listening;Explored/Affirmed feelings, thoughts, concerns;Sustaining Presence/Ministry of presence   Outcome Expressed Gratitude   Plan and Referrals   Plan/Referrals No future visits requested     Electronically signed by Aquilino Calle, Oncology Outpatient [facility]efany [facility], [facility]  [phone]  5/6/2022  3:35 PM

## 2022-05-09 RX ORDER — PANTOPRAZOLE SODIUM 40 MG/1
40 TABLET, DELAYED RELEASE ORAL DAILY
Qty: 60 TABLET | Refills: 0 | Status: SHIPPED | OUTPATIENT
Start: 2022-05-09 | End: 2022-09-01 | Stop reason: SDUPTHER

## 2022-05-09 RX ORDER — QUETIAPINE FUMARATE 25 MG/1
TABLET, FILM COATED ORAL
Qty: 30 TABLET | Refills: 0 | Status: ON HOLD
Start: 2022-05-09 | End: 2022-06-08 | Stop reason: HOSPADM

## 2022-05-12 DIAGNOSIS — C79.51 CANCER, METASTATIC TO BONE (HCC): ICD-10-CM

## 2022-05-12 DIAGNOSIS — M54.59 INTRACTABLE LOW BACK PAIN: ICD-10-CM

## 2022-05-12 RX ORDER — OXYCODONE HYDROCHLORIDE AND ACETAMINOPHEN 5; 325 MG/1; MG/1
TABLET ORAL
Qty: 180 TABLET | Refills: 0 | Status: SHIPPED | OUTPATIENT
Start: 2022-05-12 | End: 2022-05-13 | Stop reason: SDUPTHER

## 2022-05-13 ENCOUNTER — HOSPITAL ENCOUNTER (OUTPATIENT)
Dept: ULTRASOUND IMAGING | Age: 57
Discharge: HOME | End: 2022-05-15
Payer: COMMERCIAL

## 2022-05-13 ENCOUNTER — HOSPITAL ENCOUNTER (OUTPATIENT)
Dept: INFUSION THERAPY | Age: 57
Discharge: HOME | End: 2022-05-13
Payer: COMMERCIAL

## 2022-05-13 ENCOUNTER — OFFICE VISIT (OUTPATIENT)
Dept: ONCOLOGY | Age: 57
End: 2022-05-13
Payer: COMMERCIAL

## 2022-05-13 VITALS
TEMPERATURE: 97 F | WEIGHT: 170 LBS | BODY MASS INDEX: 28.29 KG/M2 | SYSTOLIC BLOOD PRESSURE: 132 MMHG | HEART RATE: 112 BPM | DIASTOLIC BLOOD PRESSURE: 73 MMHG

## 2022-05-13 DIAGNOSIS — C79.51 CANCER, METASTATIC TO BONE (HCC): ICD-10-CM

## 2022-05-13 DIAGNOSIS — C79.60 MALIGNANT NEOPLASM METASTATIC TO OVARY, UNSPECIFIED LATERALITY (HCC): Primary | ICD-10-CM

## 2022-05-13 DIAGNOSIS — C56.9 MALIGNANT NEOPLASM OF OVARY, UNSPECIFIED LATERALITY (HCC): Primary | ICD-10-CM

## 2022-05-13 DIAGNOSIS — Z85.43 HISTORY OF OVARIAN CANCER: ICD-10-CM

## 2022-05-13 DIAGNOSIS — M54.59 INTRACTABLE LOW BACK PAIN: ICD-10-CM

## 2022-05-13 DIAGNOSIS — C79.60 MALIGNANT NEOPLASM METASTATIC TO OVARY, UNSPECIFIED LATERALITY (HCC): ICD-10-CM

## 2022-05-13 DIAGNOSIS — G47.00 INSOMNIA, UNSPECIFIED TYPE: ICD-10-CM

## 2022-05-13 DIAGNOSIS — M79.89 LEG SWELLING: ICD-10-CM

## 2022-05-13 LAB
ABSOLUTE EOS #: 0.03 K/UL (ref 0–0.4)
ABSOLUTE LYMPH #: 1.02 K/UL (ref 1–4.8)
ABSOLUTE MONO #: 0.1 K/UL (ref 0.1–0.8)
ALBUMIN SERPL-MCNC: 3.1 G/DL (ref 3.5–5.2)
ALBUMIN/GLOBULIN RATIO: 1.4 (ref 1–2.5)
ALP BLD-CCNC: 66 U/L (ref 35–104)
ALT SERPL-CCNC: 18 U/L (ref 5–33)
ANION GAP SERPL CALCULATED.3IONS-SCNC: 10 MMOL/L (ref 9–17)
AST SERPL-CCNC: 25 U/L
BASOPHILS # BLD: 0 % (ref 0–2)
BASOPHILS ABSOLUTE: 0 K/UL (ref 0–0.2)
BILIRUB SERPL-MCNC: 0.11 MG/DL (ref 0.3–1.2)
BUN BLDV-MCNC: 4 MG/DL (ref 6–20)
CA 125: 415 U/ML
CALCIUM SERPL-MCNC: 8 MG/DL (ref 8.6–10.4)
CHLORIDE BLD-SCNC: 102 MMOL/L (ref 98–107)
CO2: 26 MMOL/L (ref 20–31)
CREAT SERPL-MCNC: <0.4 MG/DL (ref 0.5–0.9)
EOSINOPHILS RELATIVE PERCENT: 1 % (ref 1–4)
GFR AFRICAN AMERICAN: ABNORMAL ML/MIN
GFR NON-AFRICAN AMERICAN: ABNORMAL ML/MIN
GFR SERPL CREATININE-BSD FRML MDRD: ABNORMAL ML/MIN/{1.73_M2}
GLUCOSE BLD-MCNC: 99 MG/DL (ref 70–99)
HCT VFR BLD CALC: 23.9 % (ref 36–46)
HEMOGLOBIN: 7.1 G/DL (ref 12–16)
LYMPHOCYTES # BLD: 30 % (ref 24–44)
MCH RBC QN AUTO: 29.1 PG (ref 26–34)
MCHC RBC AUTO-ENTMCNC: 29.7 G/DL (ref 31–37)
MCV RBC AUTO: 98 FL (ref 80–100)
MONOCYTES # BLD: 3 % (ref 1–7)
MORPHOLOGY: ABNORMAL
PDW BLD-RTO: 19.3 % (ref 12.5–15.4)
PLATELET # BLD: 407 K/UL (ref 140–450)
PMV BLD AUTO: 8.8 FL (ref 8–14)
POTASSIUM SERPL-SCNC: 3.5 MMOL/L (ref 3.7–5.3)
RBC # BLD: 2.44 M/UL (ref 4–5.2)
SEG NEUTROPHILS: 66 % (ref 36–66)
SEGMENTED NEUTROPHILS ABSOLUTE COUNT: 2.25 K/UL (ref 1.8–7.7)
SODIUM BLD-SCNC: 138 MMOL/L (ref 135–144)
TOTAL PROTEIN: 5.3 G/DL (ref 6.4–8.3)
WBC # BLD: 3.4 K/UL (ref 3.5–11)

## 2022-05-13 PROCEDURE — 3017F COLORECTAL CA SCREEN DOC REV: CPT | Performed by: INTERNAL MEDICINE

## 2022-05-13 PROCEDURE — G8417 CALC BMI ABV UP PARAM F/U: HCPCS | Performed by: INTERNAL MEDICINE

## 2022-05-13 PROCEDURE — 2580000003 HC RX 258: Performed by: INTERNAL MEDICINE

## 2022-05-13 PROCEDURE — 4004F PT TOBACCO SCREEN RCVD TLK: CPT | Performed by: INTERNAL MEDICINE

## 2022-05-13 PROCEDURE — A4216 STERILE WATER/SALINE, 10 ML: HCPCS | Performed by: INTERNAL MEDICINE

## 2022-05-13 PROCEDURE — 93971 EXTREMITY STUDY: CPT

## 2022-05-13 PROCEDURE — 6360000002 HC RX W HCPCS: Performed by: INTERNAL MEDICINE

## 2022-05-13 PROCEDURE — 85025 COMPLETE CBC W/AUTO DIFF WBC: CPT

## 2022-05-13 PROCEDURE — 96413 CHEMO IV INFUSION 1 HR: CPT

## 2022-05-13 PROCEDURE — 99214 OFFICE O/P EST MOD 30 MIN: CPT | Performed by: INTERNAL MEDICINE

## 2022-05-13 PROCEDURE — 96375 TX/PRO/DX INJ NEW DRUG ADDON: CPT

## 2022-05-13 PROCEDURE — 86304 IMMUNOASSAY TUMOR CA 125: CPT

## 2022-05-13 PROCEDURE — 6370000000 HC RX 637 (ALT 250 FOR IP): Performed by: INTERNAL MEDICINE

## 2022-05-13 PROCEDURE — G8427 DOCREV CUR MEDS BY ELIG CLIN: HCPCS | Performed by: INTERNAL MEDICINE

## 2022-05-13 PROCEDURE — 80053 COMPREHEN METABOLIC PANEL: CPT

## 2022-05-13 PROCEDURE — 36591 DRAW BLOOD OFF VENOUS DEVICE: CPT

## 2022-05-13 RX ORDER — EPINEPHRINE 1 MG/ML
0.3 INJECTION, SOLUTION, CONCENTRATE INTRAVENOUS PRN
Status: CANCELLED | OUTPATIENT
Start: 2022-07-22

## 2022-05-13 RX ORDER — SODIUM CHLORIDE 0.9 % (FLUSH) 0.9 %
5 SYRINGE (ML) INJECTION PRN
Status: CANCELLED | OUTPATIENT
Start: 2022-08-12

## 2022-05-13 RX ORDER — SODIUM CHLORIDE 9 MG/ML
20 INJECTION, SOLUTION INTRAVENOUS ONCE
Status: CANCELLED | OUTPATIENT
Start: 2022-07-22

## 2022-05-13 RX ORDER — HEPARIN SODIUM (PORCINE) LOCK FLUSH IV SOLN 100 UNIT/ML 100 UNIT/ML
500 SOLUTION INTRAVENOUS PRN
Status: CANCELLED | OUTPATIENT
Start: 2022-08-12

## 2022-05-13 RX ORDER — DEXAMETHASONE SODIUM PHOSPHATE 10 MG/ML
8 INJECTION INTRAMUSCULAR; INTRAVENOUS ONCE
Status: COMPLETED | OUTPATIENT
Start: 2022-05-13 | End: 2022-05-13

## 2022-05-13 RX ORDER — METHYLPREDNISOLONE SODIUM SUCCINATE 125 MG/2ML
125 INJECTION, POWDER, LYOPHILIZED, FOR SOLUTION INTRAMUSCULAR; INTRAVENOUS ONCE
Status: CANCELLED | OUTPATIENT
Start: 2022-08-12 | End: 2022-06-03

## 2022-05-13 RX ORDER — FAMOTIDINE 10 MG/ML
20 INJECTION, SOLUTION INTRAVENOUS ONCE
Status: CANCELLED | OUTPATIENT
Start: 2022-08-12 | End: 2022-06-03

## 2022-05-13 RX ORDER — HEPARIN SODIUM (PORCINE) LOCK FLUSH IV SOLN 100 UNIT/ML 100 UNIT/ML
500 SOLUTION INTRAVENOUS PRN
Status: DISCONTINUED | OUTPATIENT
Start: 2022-05-13 | End: 2022-05-14 | Stop reason: HOSPADM

## 2022-05-13 RX ORDER — SODIUM CHLORIDE 0.9 % (FLUSH) 0.9 %
10 SYRINGE (ML) INJECTION PRN
Status: DISCONTINUED | OUTPATIENT
Start: 2022-05-13 | End: 2022-05-14 | Stop reason: HOSPADM

## 2022-05-13 RX ORDER — SODIUM CHLORIDE 9 MG/ML
20 INJECTION, SOLUTION INTRAVENOUS ONCE
Status: CANCELLED | OUTPATIENT
Start: 2022-08-12

## 2022-05-13 RX ORDER — SODIUM CHLORIDE 0.9 % (FLUSH) 0.9 %
10 SYRINGE (ML) INJECTION PRN
Status: CANCELLED | OUTPATIENT
Start: 2022-08-12

## 2022-05-13 RX ORDER — POTASSIUM CHLORIDE 20 MEQ/1
20 TABLET, EXTENDED RELEASE ORAL ONCE
Status: COMPLETED | OUTPATIENT
Start: 2022-05-13 | End: 2022-05-13

## 2022-05-13 RX ORDER — DIPHENHYDRAMINE HYDROCHLORIDE 50 MG/ML
50 INJECTION INTRAMUSCULAR; INTRAVENOUS ONCE
Status: CANCELLED | OUTPATIENT
Start: 2022-05-13 | End: 2022-05-13

## 2022-05-13 RX ORDER — ONDANSETRON 2 MG/ML
4 INJECTION INTRAMUSCULAR; INTRAVENOUS ONCE
Status: COMPLETED | OUTPATIENT
Start: 2022-05-13 | End: 2022-05-13

## 2022-05-13 RX ORDER — METHYLPREDNISOLONE SODIUM SUCCINATE 125 MG/2ML
125 INJECTION, POWDER, LYOPHILIZED, FOR SOLUTION INTRAMUSCULAR; INTRAVENOUS ONCE
Status: CANCELLED | OUTPATIENT
Start: 2022-07-22 | End: 2022-05-27

## 2022-05-13 RX ORDER — ONDANSETRON 4 MG/1
4 TABLET, ORALLY DISINTEGRATING ORAL EVERY 8 HOURS PRN
Qty: 60 TABLET | Refills: 2 | Status: SHIPPED | OUTPATIENT
Start: 2022-05-13

## 2022-05-13 RX ORDER — SODIUM CHLORIDE 9 MG/ML
INJECTION, SOLUTION INTRAVENOUS CONTINUOUS
Status: CANCELLED | OUTPATIENT
Start: 2022-08-12

## 2022-05-13 RX ORDER — HEPARIN SODIUM (PORCINE) LOCK FLUSH IV SOLN 100 UNIT/ML 100 UNIT/ML
500 SOLUTION INTRAVENOUS PRN
Status: CANCELLED | OUTPATIENT
Start: 2022-07-22

## 2022-05-13 RX ORDER — FAMOTIDINE 10 MG/ML
20 INJECTION, SOLUTION INTRAVENOUS ONCE
Status: CANCELLED | OUTPATIENT
Start: 2022-07-22 | End: 2022-05-27

## 2022-05-13 RX ORDER — SODIUM CHLORIDE 9 MG/ML
20 INJECTION, SOLUTION INTRAVENOUS ONCE
Status: DISCONTINUED | OUTPATIENT
Start: 2022-05-13 | End: 2022-05-14 | Stop reason: HOSPADM

## 2022-05-13 RX ORDER — LORAZEPAM 1 MG/1
1 TABLET ORAL EVERY 8 HOURS PRN
Qty: 90 TABLET | Refills: 0 | Status: SHIPPED | OUTPATIENT
Start: 2022-05-13 | End: 2022-07-19

## 2022-05-13 RX ORDER — SODIUM CHLORIDE 9 MG/ML
INJECTION, SOLUTION INTRAVENOUS CONTINUOUS
Status: CANCELLED | OUTPATIENT
Start: 2022-07-22

## 2022-05-13 RX ORDER — METHYLPREDNISOLONE SODIUM SUCCINATE 125 MG/2ML
125 INJECTION, POWDER, LYOPHILIZED, FOR SOLUTION INTRAMUSCULAR; INTRAVENOUS ONCE
Status: CANCELLED | OUTPATIENT
Start: 2022-05-13 | End: 2022-05-13

## 2022-05-13 RX ORDER — SODIUM CHLORIDE 0.9 % (FLUSH) 0.9 %
5 SYRINGE (ML) INJECTION PRN
Status: CANCELLED | OUTPATIENT
Start: 2022-05-13

## 2022-05-13 RX ORDER — SODIUM CHLORIDE 0.9 % (FLUSH) 0.9 %
5 SYRINGE (ML) INJECTION PRN
Status: CANCELLED | OUTPATIENT
Start: 2022-07-22

## 2022-05-13 RX ORDER — OXYCODONE HYDROCHLORIDE AND ACETAMINOPHEN 5; 325 MG/1; MG/1
1 TABLET ORAL EVERY 4 HOURS PRN
Qty: 180 TABLET | Refills: 0 | Status: SHIPPED | OUTPATIENT
Start: 2022-05-13 | End: 2022-06-12

## 2022-05-13 RX ORDER — PALONOSETRON 0.05 MG/ML
0.25 INJECTION, SOLUTION INTRAVENOUS ONCE
Status: CANCELLED | OUTPATIENT
Start: 2022-07-22

## 2022-05-13 RX ORDER — EPINEPHRINE 1 MG/ML
0.3 INJECTION, SOLUTION, CONCENTRATE INTRAVENOUS PRN
Status: CANCELLED | OUTPATIENT
Start: 2022-05-13

## 2022-05-13 RX ORDER — DIPHENHYDRAMINE HYDROCHLORIDE 50 MG/ML
50 INJECTION INTRAMUSCULAR; INTRAVENOUS ONCE
Status: CANCELLED | OUTPATIENT
Start: 2022-07-22 | End: 2022-05-27

## 2022-05-13 RX ORDER — FAMOTIDINE 10 MG/ML
20 INJECTION, SOLUTION INTRAVENOUS ONCE
Status: CANCELLED | OUTPATIENT
Start: 2022-05-13 | End: 2022-05-13

## 2022-05-13 RX ORDER — EPINEPHRINE 1 MG/ML
0.3 INJECTION, SOLUTION, CONCENTRATE INTRAVENOUS PRN
Status: CANCELLED | OUTPATIENT
Start: 2022-08-12

## 2022-05-13 RX ORDER — SODIUM CHLORIDE 9 MG/ML
INJECTION, SOLUTION INTRAVENOUS CONTINUOUS
Status: CANCELLED | OUTPATIENT
Start: 2022-05-13

## 2022-05-13 RX ORDER — DIPHENHYDRAMINE HYDROCHLORIDE 50 MG/ML
50 INJECTION INTRAMUSCULAR; INTRAVENOUS ONCE
Status: CANCELLED | OUTPATIENT
Start: 2022-08-12 | End: 2022-06-03

## 2022-05-13 RX ORDER — SODIUM CHLORIDE 0.9 % (FLUSH) 0.9 %
10 SYRINGE (ML) INJECTION PRN
Status: CANCELLED | OUTPATIENT
Start: 2022-07-22

## 2022-05-13 RX ADMIN — DEXAMETHASONE SODIUM PHOSPHATE 8 MG: 10 INJECTION INTRAMUSCULAR; INTRAVENOUS at 13:31

## 2022-05-13 RX ADMIN — POTASSIUM CHLORIDE 20 MEQ: 1500 TABLET, EXTENDED RELEASE ORAL at 14:16

## 2022-05-13 RX ADMIN — SODIUM CHLORIDE, PRESERVATIVE FREE 10 ML: 5 INJECTION INTRAVENOUS at 15:12

## 2022-05-13 RX ADMIN — GEMCITABINE 1800 MG: 38 INJECTION, SOLUTION INTRAVENOUS at 14:34

## 2022-05-13 RX ADMIN — SODIUM CHLORIDE 21 ML/HR: 9 INJECTION, SOLUTION INTRAVENOUS at 13:31

## 2022-05-13 RX ADMIN — Medication 500 UNITS: at 15:12

## 2022-05-13 RX ADMIN — ONDANSETRON 4 MG: 2 INJECTION INTRAMUSCULAR; INTRAVENOUS at 13:31

## 2022-05-13 NOTE — FLOWSHEET NOTE
SPIRITUAL CARE PROGRESS NOTE: Outpatient Oncology at [facility]    Spiritual Assessment: Ms. Audria Brunner was at the checkout station. Her head was bowed down. Writer approached her and greeted her. Patient appeared upset and had tears in her eyes. She told writer her stomach hurt. She continued crying. Writer returned with Kleenex. Patient checked out. Writer escorted Pt to the infusion clinic. Patient spoke more about her symptoms. She expressed hopes of feeling better. Writer exited after Pt arrived to the infusion clinic. Writer returned when Pt was finished with treatment. Writer escorted Pt in her wheelchair to the Emergency Department. Patient shared how she was feeling and the purpose of her test. Pt was quiet and responded to writer's questions with brief answers. She smiled when she talked about her son and grandchildren and noted that she lived in [place]. She registered for her test and asked writer to bring her close to the TV in the waiting area of outpatient testing. Intervention: Writer provided supportive presence and empathy; inquired about Pt's coping and needs; affirmed Pt's strengths; offered words of support and encouragement; assured Pt of her prayers. Outcome: Patient shared her concerns. She expressed gratitude for writer's prayers. Plan: Chaplains will remain available to provide emotional and spiritual support as needed. 05/13/22 1542   Encounter Summary   Encounter Overview/Reason  Spiritual/Emotional Needs   Service Provided For: Patient   Referral/Consult From: [address] Family members; Children   Last Encounter  05/06/22   Complexity of Encounter Moderate   Begin Time 1320   End Time  1340   Total Time Calculated 20 min   Encounter    Type Follow up   Spiritual/Emotional needs   Type Spiritual Support   Grief, Loss, and Adjustments   Type Adjustment to illness   Assessment/Intervention/Outcome   Assessment Coping; Impaired resilience;Sad;Tearful

## 2022-05-13 NOTE — PROGRESS NOTES
Seen by Dr. Rojo Sessions today. Ok to treat patient. K 3.5, gave 20 meq. Patient also feeling nauseous, gave 4 mg zofran. Patient felt much better after zofran. Tolerated treatment w/o incident and left in stable condition. Returns  6/3 for doctor visit.

## 2022-05-16 ENCOUNTER — TELEPHONE (OUTPATIENT)
Dept: ONCOLOGY | Age: 57
End: 2022-05-16

## 2022-05-16 NOTE — TELEPHONE ENCOUNTER
AVS from 5/13/22     Proceed with treatment after checking labs  RV 2-3 weeks  Left leg doppler US soon  Ct scans soon    Proceed with tx as planned  rv scheduled for 6/3/22  @9 am with tx to follow  Left leg doppler 5/13/22.  Lien rn notified to send pt to have doppler done when she is finished with chemo  Ct scheduled for 5/23/22 @ 12pm (per scheduling test had to be scheduled out 5 business days due to insurance)    Pt was given AVS and appt schedule

## 2022-05-18 DIAGNOSIS — M54.59 INTRACTABLE LOW BACK PAIN: ICD-10-CM

## 2022-05-18 DIAGNOSIS — C79.51 CANCER, METASTATIC TO BONE (HCC): ICD-10-CM

## 2022-05-19 RX ORDER — MORPHINE SULFATE 15 MG/1
15 TABLET, FILM COATED, EXTENDED RELEASE ORAL 2 TIMES DAILY
Qty: 60 TABLET | Refills: 0 | Status: ON HOLD | OUTPATIENT
Start: 2022-05-19 | End: 2022-06-13 | Stop reason: SDUPTHER

## 2022-05-19 RX ORDER — MORPHINE SULFATE 30 MG/1
30 TABLET, FILM COATED, EXTENDED RELEASE ORAL 2 TIMES DAILY
Qty: 60 TABLET | Refills: 0 | Status: SHIPPED | OUTPATIENT
Start: 2022-05-19 | End: 2022-06-18

## 2022-05-24 NOTE — PROGRESS NOTES
_      Chief Complaint   Patient presents with    Follow-up     review status of disease    Other     left leg swollen    Pain     cramping in stomach     Other     lower back pain      DIAGNOSIS:       Recurrent ovarian cancer. Original diagnosis 2005 with multiple recurrences. Diffuse metastasis. CURRENT THERAPY:         Doxil/ Avastin started 9/18/2020. Avastin was discontinued due to recent GI bleeding. Status post recent vascular embolization  S/p seizure disorder. Gemzar started 2/26/2021. Interrupted due to hospitalization and problem with compliance. Evidence of disease progression on CT scan 2/22/22  Add Carboplatin to Gemzar March 2022      BRIEF CASE HISTORY:      Ms. Martha Wells is a very pleasant 59 y.o. female with history of multiple co morbidities as listed. The patient seen in consultation for ovarian cancer with multiple recurrences. 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 She had surgical debulking and she had systemic treatment with Taxol and carboplatin for 6 cycles. Patient was in remission for about 2 years. She had a relapse in 2007 and treated with topotecan with good results. Patient relapsed again in 2008 and was treated with Taxol carboplatin. After 3 cycles of systemic chemotherapy she had problems with carboplatin so she finished 3 more cycles with Taxol. She did well again for 2 to 3 years until she had a relapse in 2012 and she had intraperitoneal chemotherapy treatment with Taxol. Patient was last seen by her oncologist in 2014 at which time she had relatively stable disease with normal tumor marker and no significant abnormal images. Patient moved to [place] after that and she was lost for oncology follow-ups. Patient was recently evaluated again here in [facility] because of abdominal discomfort.  Re imaging confirmed metastatic relapse. Biopsy confirmed ovarian cancer recurrence. Scan showed extensive intraabdominal and splenic involvement and lung mets. She has no symptoms at the present time. Patient denies smoking or alcohol drinking.l    INTERIM HISTORY:    patient is seen for follow up recurrent ovarian cancer. Started on Gemzar. Tolerated well. No melena or hematochezia. No hematemesis. Currently stable. No recent seizure activities. Labs are stable as well. No severe anemia. No active bleeding. Chemotherapy is tolerated fairly well. PAST MEDICAL HISTORY: has a past medical history of Anemia, Bleeding, Cervical cancer ([facility] [address] Depression, Diabetes mellitus ([facility] [address] GERD (gastroesophageal reflux disease), Hx of blood clots, Hypertension, Metastatic cancer ([facility] [address] Ovarian cancer ([facility] [address] Post chemo evaluation, and Splenic lesion. PAST SURGICAL HISTORY: has a past surgical history that includes Hysterectomy, total abdominal; Port Surgery; Tonsillectomy; IR PORT PLACEMENT > 5 YEARS (08/24/2020); Anus surgery; Abscess Drainage (2013); colectomy (03/2013); IR EMBOLIZATION HEMORRHAGE (10/05/2020); and Cardiac catheterization. CURRENT MEDICATIONS:  has a current medication list which includes the following prescription(s): oxycodone-acetaminophen, lorazepam, ondansetron, quetiapine, pantoprazole, senna, quetiapine, melatonin, benzonatate, apixaban, levetiracetam, hydrocortisone, lamotrigine, ferrous sulfate, morphine, morphine, sertraline, and melatonin. ALLERGIES:  is allergic to ceftriaxone. FAMILY HISTORY: Negative for any hematological or oncological conditions. SOCIAL HISTORY:  reports that she has been smoking cigarettes. She has a 20.00 pack-year smoking history. She has never used smokeless tobacco. She reports previous alcohol use. She reports that she does not use drugs. REVIEW OF SYSTEMS:     · General: No weakness or fatigue. No unanticipated weight loss or decreased appetite. No fever or chills. · Eyes: No blurred vision, eye pain or double vision. · Ears: No hearing problems or drainage. No tinnitus. · Throat: No sore throat, problems with swallowing or dysphagia. · Respiratory: No cough, sputum or hemoptysis. No shortness of breath. No pleuritic chest pain. · Cardiovascular: No chest pain, orthopnea or PND. No lower extremity edema. No palpitation. · Gastrointestinal: As above. · Genitourinary: No dysuria, hematuria, frequency or urgency. · Musculoskeletal: No muscle aches or pains. No limitation of movement. No back pain. No gait disturbance, No joint complaints. · Dermatologic: No skin rashes or pruritus. No skin lesions or discolorations. · Psychiatric: No depression, anxiety, or stress or signs of schizophrenia. No change in mood or affect. · Hematologic: No history of bleeding tendency. No bruises or ecchymosis. No history of clotting problems. · Infectious disease: No fever, chills or frequent infections. · Endocrine: No polydipsia or polyuria. No temperature intolerance. · Neurologic: No headaches or dizziness. No weakness or numbness of the extremities. No changes in balance, coordination,  memory, mentation, behavior. · Allergic/Immunologic: No nasal congestion or hives. No repeated infections. PHYSICAL EXAM:  The patient is not in acute distress. Vital signs: Blood pressure 132/73, pulse 112, temperature 97 °F (36.1 °C), temperature source Temporal, weight 170 lb (77.1 kg).      General appearance - well appearing, not in pain or distress  Mental status - good mood, alert and oriented  Eyes - pupils equal and reactive, extraocular eye movements intact  Ears - bilateral TM's and external ear canals normal  Nose - normal and patent, no erythema, discharge or polyps  Mouth - mucous membranes moist, pharynx normal without lesions  Neck - supple, no significant adenopathy  Lymphatics - no palpable lymphadenopathy, no hepatosplenomegaly  Chest - clear to auscultation, no wheezes, rales or rhonchi, symmetric air entry  Heart - normal rate, regular rhythm, normal S1, S2, no murmurs, rubs, clicks or gallops  Abdomen - soft, nontender, nondistended, no masses or organomegaly  Neurological - alert, oriented, normal speech, no focal findings or movement disorder noted  Musculoskeletal - no joint tenderness, deformity or swelling  Extremities - peripheral pulses normal, no pedal edema, no clubbing or cyanosis  Skin - normal coloration and turgor, no rashes, no suspicious skin lesions noted     Review of Diagnostic data:   Lab Results   Component Value Date    WBC 3.4 (L) 05/13/2022    HGB 7.1 (L) 05/13/2022    HCT 23.9 (L) 05/13/2022    MCV 98.0 05/13/2022     05/13/2022       Chemistry        Component Value Date/Time     05/13/2022 1200    K 3.5 (L) 05/13/2022 1200     05/13/2022 1200    CO2 26 05/13/2022 1200    BUN 4 (L) 05/13/2022 1200    CREATININE <0.40 (L) 05/13/2022 1200        Component Value Date/Time    CALCIUM 8.0 (L) 05/13/2022 1200    ALKPHOS 66 05/13/2022 1200    AST 25 05/13/2022 1200    ALT 18 05/13/2022 1200    BILITOT 0.11 (L) 05/13/2022 1200          Lab Results   Component Value Date     415 (H) 05/13/2022         IMPRESSION:   Recurrent ovarian cancer. Original diagnosis 2005 with multiple recurrences. Diffuse metastasis. Recent active intra-abdominal bleeding due to splenic mass with GI infiltration. Status post embolization  S/p seizure disorder. PLAN:   S/p multiple hospitalization for different problems as above. CT scan showed evidence of progressive disease. Currently patient is relatively stable and tolerating chemotherapy fairly well. Repeated tumor marker showed fluctuating results. CT scan showed progressive disease. Discussed options. We added Carboplatin. Continue adjusted dose Gemzar. I will continue on treatment until progression or intolerable side effects.   We will check CT scan soon for evaluation of response. Patient's questions were answered to the best of her satisfaction and she verbalized full understanding and agreement.

## 2022-06-03 ENCOUNTER — HOSPITAL ENCOUNTER (INPATIENT)
Age: 57
LOS: 10 days | Discharge: NURSING, REHAB OR LONG-TERM CARE | DRG: 812 | End: 2022-06-13
Attending: EMERGENCY MEDICINE | Admitting: INTERNAL MEDICINE
Payer: COMMERCIAL

## 2022-06-03 ENCOUNTER — APPOINTMENT (OUTPATIENT)
Dept: CT IMAGING | Age: 57
DRG: 812 | End: 2022-06-03
Payer: COMMERCIAL

## 2022-06-03 ENCOUNTER — APPOINTMENT (OUTPATIENT)
Dept: GENERAL RADIOLOGY | Age: 57
DRG: 812 | End: 2022-06-03
Payer: COMMERCIAL

## 2022-06-03 ENCOUNTER — HOSPITAL ENCOUNTER (OUTPATIENT)
Dept: INFUSION THERAPY | Age: 57
Discharge: HOME | End: 2022-06-03

## 2022-06-03 DIAGNOSIS — C56.9 MALIGNANT NEOPLASM OF OVARY, UNSPECIFIED LATERALITY (HCC): ICD-10-CM

## 2022-06-03 DIAGNOSIS — Z63.4 GRIEF AT LOSS OF CHILD: ICD-10-CM

## 2022-06-03 DIAGNOSIS — C79.51 CANCER, METASTATIC TO BONE (HCC): ICD-10-CM

## 2022-06-03 DIAGNOSIS — D64.9 ANEMIA, UNSPECIFIED TYPE: Primary | ICD-10-CM

## 2022-06-03 DIAGNOSIS — M54.59 INTRACTABLE LOW BACK PAIN: ICD-10-CM

## 2022-06-03 DIAGNOSIS — F43.21 GRIEF AT LOSS OF CHILD: ICD-10-CM

## 2022-06-03 DIAGNOSIS — D50.8 IRON DEFICIENCY ANEMIA SECONDARY TO INADEQUATE DIETARY IRON INTAKE: ICD-10-CM

## 2022-06-03 DIAGNOSIS — F33.41 RECURRENT MAJOR DEPRESSIVE DISORDER, IN PARTIAL REMISSION (HCC): ICD-10-CM

## 2022-06-03 LAB
-: ABNORMAL
ABSOLUTE EOS #: 0 K/UL (ref 0–0.44)
ABSOLUTE IMMATURE GRANULOCYTE: 0.14 K/UL (ref 0–0.3)
ABSOLUTE LYMPH #: 1.11 K/UL (ref 1.1–3.7)
ABSOLUTE MONO #: 1.53 K/UL (ref 0.1–1.2)
ALBUMIN SERPL-MCNC: 2.3 G/DL (ref 3.5–5.2)
ALBUMIN/GLOBULIN RATIO: 1 (ref 1–2.5)
ALP BLD-CCNC: 112 U/L (ref 35–104)
ALT SERPL-CCNC: 7 U/L (ref 5–33)
ANION GAP SERPL CALCULATED.3IONS-SCNC: 8 MMOL/L (ref 9–17)
AST SERPL-CCNC: 11 U/L
BASOPHILS # BLD: 1 % (ref 0–2)
BASOPHILS ABSOLUTE: 0.14 K/UL (ref 0–0.2)
BILIRUB SERPL-MCNC: <0.1 MG/DL (ref 0.3–1.2)
BILIRUBIN URINE: NEGATIVE
BUN BLDV-MCNC: 15 MG/DL (ref 6–20)
CALCIUM SERPL-MCNC: 7.6 MG/DL (ref 8.6–10.4)
CASTS UA: ABNORMAL /LPF (ref 0–8)
CHLORIDE BLD-SCNC: 102 MMOL/L (ref 98–107)
CO2: 24 MMOL/L (ref 20–31)
COLOR: YELLOW
CREAT SERPL-MCNC: 0.52 MG/DL (ref 0.5–0.9)
CRYSTALS, UA: ABNORMAL /HPF
EOSINOPHILS RELATIVE PERCENT: 0 % (ref 1–4)
EPITHELIAL CELLS UA: ABNORMAL /HPF (ref 0–5)
GFR AFRICAN AMERICAN: >60 ML/MIN
GFR NON-AFRICAN AMERICAN: >60 ML/MIN
GFR SERPL CREATININE-BSD FRML MDRD: ABNORMAL ML/MIN/{1.73_M2}
GLUCOSE BLD-MCNC: 131 MG/DL (ref 70–99)
GLUCOSE URINE: NEGATIVE
HCT VFR BLD CALC: 23.9 % (ref 36.3–47.1)
HCT VFR BLD CALC: 28.7 % (ref 36.3–47.1)
HEMOGLOBIN: 6.3 G/DL (ref 11.9–15.1)
HEMOGLOBIN: 8.1 G/DL (ref 11.9–15.1)
IMMATURE GRANULOCYTES: 1 %
KETONES, URINE: ABNORMAL
LEUKOCYTE ESTERASE, URINE: ABNORMAL
LYMPHOCYTES # BLD: 8 % (ref 24–43)
MCH RBC QN AUTO: 26 PG (ref 25.2–33.5)
MCHC RBC AUTO-ENTMCNC: 26.4 G/DL (ref 28.4–34.8)
MCV RBC AUTO: 98.8 FL (ref 82.6–102.9)
MONOCYTES # BLD: 11 % (ref 3–12)
MORPHOLOGY: ABNORMAL
NITRITE, URINE: NEGATIVE
NRBC AUTOMATED: 0.3 PER 100 WBC
PDW BLD-RTO: 19.7 % (ref 11.8–14.4)
PH UA: 6 (ref 5–8)
PLATELET # BLD: 794 K/UL (ref 138–453)
PMV BLD AUTO: 9.1 FL (ref 8.1–13.5)
POTASSIUM SERPL-SCNC: 3.6 MMOL/L (ref 3.7–5.3)
PROTEIN UA: NEGATIVE
RBC # BLD: 2.42 M/UL (ref 3.95–5.11)
RBC UA: ABNORMAL /HPF (ref 0–4)
SARS-COV-2, RAPID: NOT DETECTED
SEG NEUTROPHILS: 79 % (ref 36–65)
SEGMENTED NEUTROPHILS ABSOLUTE COUNT: 10.98 K/UL (ref 1.5–8.1)
SODIUM BLD-SCNC: 134 MMOL/L (ref 135–144)
SPECIFIC GRAVITY UA: 1.03 (ref 1–1.03)
SPECIMEN DESCRIPTION: NORMAL
TOTAL PROTEIN: 4.5 G/DL (ref 6.4–8.3)
TROPONIN, HIGH SENSITIVITY: 11 NG/L (ref 0–14)
TROPONIN, HIGH SENSITIVITY: 8 NG/L (ref 0–14)
TURBIDITY: CLEAR
URINE HGB: NEGATIVE
UROBILINOGEN, URINE: NORMAL
WBC # BLD: 13.9 K/UL (ref 3.5–11.3)
WBC UA: ABNORMAL /HPF (ref 0–5)

## 2022-06-03 PROCEDURE — 96361 HYDRATE IV INFUSION ADD-ON: CPT

## 2022-06-03 PROCEDURE — 93005 ELECTROCARDIOGRAM TRACING: CPT | Performed by: STUDENT IN AN ORGANIZED HEALTH CARE EDUCATION/TRAINING PROGRAM

## 2022-06-03 PROCEDURE — 2580000003 HC RX 258: Performed by: STUDENT IN AN ORGANIZED HEALTH CARE EDUCATION/TRAINING PROGRAM

## 2022-06-03 PROCEDURE — 6360000004 HC RX CONTRAST MEDICATION: Performed by: STUDENT IN AN ORGANIZED HEALTH CARE EDUCATION/TRAINING PROGRAM

## 2022-06-03 PROCEDURE — 99285 EMERGENCY DEPT VISIT HI MDM: CPT

## 2022-06-03 PROCEDURE — 86901 BLOOD TYPING SEROLOGIC RH(D): CPT

## 2022-06-03 PROCEDURE — 81001 URINALYSIS AUTO W/SCOPE: CPT

## 2022-06-03 PROCEDURE — 86902 BLOOD TYPE ANTIGEN DONOR EA: CPT

## 2022-06-03 PROCEDURE — 74177 CT ABD & PELVIS W/CONTRAST: CPT

## 2022-06-03 PROCEDURE — 85018 HEMOGLOBIN: CPT

## 2022-06-03 PROCEDURE — G0378 HOSPITAL OBSERVATION PER HR: HCPCS

## 2022-06-03 PROCEDURE — 87635 SARS-COV-2 COVID-19 AMP PRB: CPT

## 2022-06-03 PROCEDURE — 84484 ASSAY OF TROPONIN QUANT: CPT

## 2022-06-03 PROCEDURE — 6370000000 HC RX 637 (ALT 250 FOR IP): Performed by: STUDENT IN AN ORGANIZED HEALTH CARE EDUCATION/TRAINING PROGRAM

## 2022-06-03 PROCEDURE — 85025 COMPLETE CBC W/AUTO DIFF WBC: CPT

## 2022-06-03 PROCEDURE — 6360000002 HC RX W HCPCS: Performed by: STUDENT IN AN ORGANIZED HEALTH CARE EDUCATION/TRAINING PROGRAM

## 2022-06-03 PROCEDURE — 96372 THER/PROPH/DIAG INJ SC/IM: CPT

## 2022-06-03 PROCEDURE — 85014 HEMATOCRIT: CPT

## 2022-06-03 PROCEDURE — 96374 THER/PROPH/DIAG INJ IV PUSH: CPT

## 2022-06-03 PROCEDURE — P9016 RBC LEUKOCYTES REDUCED: HCPCS

## 2022-06-03 PROCEDURE — 1200000000 HC SEMI PRIVATE

## 2022-06-03 PROCEDURE — 86850 RBC ANTIBODY SCREEN: CPT

## 2022-06-03 PROCEDURE — 80053 COMPREHEN METABOLIC PANEL: CPT

## 2022-06-03 PROCEDURE — 71045 X-RAY EXAM CHEST 1 VIEW: CPT

## 2022-06-03 PROCEDURE — 86920 COMPATIBILITY TEST SPIN: CPT

## 2022-06-03 PROCEDURE — 99223 1ST HOSP IP/OBS HIGH 75: CPT | Performed by: INTERNAL MEDICINE

## 2022-06-03 PROCEDURE — 86900 BLOOD TYPING SEROLOGIC ABO: CPT

## 2022-06-03 RX ORDER — HYDROCORTISONE 25 MG/G
CREAM TOPICAL 2 TIMES DAILY
Status: DISCONTINUED | OUTPATIENT
Start: 2022-06-03 | End: 2022-06-13 | Stop reason: HOSPADM

## 2022-06-03 RX ORDER — SODIUM CHLORIDE 0.9 % (FLUSH) 0.9 %
5-40 SYRINGE (ML) INJECTION PRN
Status: DISCONTINUED | OUTPATIENT
Start: 2022-06-03 | End: 2022-06-13 | Stop reason: HOSPADM

## 2022-06-03 RX ORDER — CHOLECALCIFEROL (VITAMIN D3) 125 MCG
5 CAPSULE ORAL NIGHTLY PRN
Status: DISCONTINUED | OUTPATIENT
Start: 2022-06-03 | End: 2022-06-13 | Stop reason: HOSPADM

## 2022-06-03 RX ORDER — ONDANSETRON 4 MG/1
4 TABLET, ORALLY DISINTEGRATING ORAL EVERY 8 HOURS PRN
Status: DISCONTINUED | OUTPATIENT
Start: 2022-06-03 | End: 2022-06-13 | Stop reason: HOSPADM

## 2022-06-03 RX ORDER — SENNA PLUS 8.6 MG/1
1 TABLET ORAL 2 TIMES DAILY
Status: DISCONTINUED | OUTPATIENT
Start: 2022-06-03 | End: 2022-06-09

## 2022-06-03 RX ORDER — ONDANSETRON 2 MG/ML
4 INJECTION INTRAMUSCULAR; INTRAVENOUS EVERY 6 HOURS PRN
Status: DISCONTINUED | OUTPATIENT
Start: 2022-06-03 | End: 2022-06-13 | Stop reason: HOSPADM

## 2022-06-03 RX ORDER — QUETIAPINE FUMARATE 25 MG/1
50 TABLET, FILM COATED ORAL NIGHTLY
Status: DISCONTINUED | OUTPATIENT
Start: 2022-06-03 | End: 2022-06-07

## 2022-06-03 RX ORDER — ACETAMINOPHEN 325 MG/1
650 TABLET ORAL EVERY 4 HOURS PRN
Status: DISCONTINUED | OUTPATIENT
Start: 2022-06-03 | End: 2022-06-13 | Stop reason: HOSPADM

## 2022-06-03 RX ORDER — LEVETIRACETAM 500 MG/1
1000 TABLET ORAL 2 TIMES DAILY
Status: DISCONTINUED | OUTPATIENT
Start: 2022-06-03 | End: 2022-06-13 | Stop reason: HOSPADM

## 2022-06-03 RX ORDER — BENZONATATE 100 MG/1
100 CAPSULE ORAL 3 TIMES DAILY PRN
Status: DISCONTINUED | OUTPATIENT
Start: 2022-06-03 | End: 2022-06-13 | Stop reason: HOSPADM

## 2022-06-03 RX ORDER — MORPHINE SULFATE 30 MG/1
30 TABLET, FILM COATED, EXTENDED RELEASE ORAL 2 TIMES DAILY
Status: DISCONTINUED | OUTPATIENT
Start: 2022-06-03 | End: 2022-06-07

## 2022-06-03 RX ORDER — 0.9 % SODIUM CHLORIDE 0.9 %
1000 INTRAVENOUS SOLUTION INTRAVENOUS ONCE
Status: COMPLETED | OUTPATIENT
Start: 2022-06-03 | End: 2022-06-03

## 2022-06-03 RX ORDER — OXYCODONE HYDROCHLORIDE AND ACETAMINOPHEN 5; 325 MG/1; MG/1
1 TABLET ORAL EVERY 4 HOURS PRN
Status: DISCONTINUED | OUTPATIENT
Start: 2022-06-03 | End: 2022-06-07

## 2022-06-03 RX ORDER — MORPHINE SULFATE 15 MG/1
15 TABLET, FILM COATED, EXTENDED RELEASE ORAL 2 TIMES DAILY
Status: DISCONTINUED | OUTPATIENT
Start: 2022-06-03 | End: 2022-06-07

## 2022-06-03 RX ORDER — LORAZEPAM 0.5 MG/1
1 TABLET ORAL EVERY 8 HOURS PRN
Status: DISCONTINUED | OUTPATIENT
Start: 2022-06-03 | End: 2022-06-07

## 2022-06-03 RX ORDER — DOCUSATE SODIUM 100 MG/1
100 CAPSULE, LIQUID FILLED ORAL DAILY
Status: DISCONTINUED | OUTPATIENT
Start: 2022-06-03 | End: 2022-06-09

## 2022-06-03 RX ORDER — CHOLECALCIFEROL (VITAMIN D3) 125 MCG
5 CAPSULE ORAL DAILY
Status: DISCONTINUED | OUTPATIENT
Start: 2022-06-03 | End: 2022-06-03

## 2022-06-03 RX ORDER — LANOLIN ALCOHOL/MO/W.PET/CERES
325 CREAM (GRAM) TOPICAL
Status: DISCONTINUED | OUTPATIENT
Start: 2022-06-04 | End: 2022-06-04

## 2022-06-03 RX ORDER — QUETIAPINE FUMARATE 25 MG/1
25 TABLET, FILM COATED ORAL NIGHTLY
Status: DISCONTINUED | OUTPATIENT
Start: 2022-06-03 | End: 2022-06-13 | Stop reason: HOSPADM

## 2022-06-03 RX ORDER — SODIUM CHLORIDE 0.9 % (FLUSH) 0.9 %
5-40 SYRINGE (ML) INJECTION EVERY 12 HOURS SCHEDULED
Status: DISCONTINUED | OUTPATIENT
Start: 2022-06-03 | End: 2022-06-13 | Stop reason: HOSPADM

## 2022-06-03 RX ORDER — PANTOPRAZOLE SODIUM 40 MG/1
40 TABLET, DELAYED RELEASE ORAL DAILY
Status: DISCONTINUED | OUTPATIENT
Start: 2022-06-03 | End: 2022-06-13 | Stop reason: HOSPADM

## 2022-06-03 RX ORDER — SODIUM CHLORIDE 9 MG/ML
INJECTION, SOLUTION INTRAVENOUS PRN
Status: DISCONTINUED | OUTPATIENT
Start: 2022-06-03 | End: 2022-06-13 | Stop reason: HOSPADM

## 2022-06-03 RX ADMIN — SENNOSIDES 8.6 MG: 8.6 TABLET, COATED ORAL at 22:27

## 2022-06-03 RX ADMIN — LEVETIRACETAM 1000 MG: 500 TABLET, FILM COATED ORAL at 20:39

## 2022-06-03 RX ADMIN — IOPAMIDOL 75 ML: 755 INJECTION, SOLUTION INTRAVENOUS at 12:07

## 2022-06-03 RX ADMIN — HYDROMORPHONE HYDROCHLORIDE 1 MG: 1 INJECTION, SOLUTION INTRAMUSCULAR; INTRAVENOUS; SUBCUTANEOUS at 11:04

## 2022-06-03 RX ADMIN — LORAZEPAM 1 MG: 0.5 TABLET ORAL at 20:39

## 2022-06-03 RX ADMIN — MORPHINE SULFATE 30 MG: 30 TABLET, FILM COATED, EXTENDED RELEASE ORAL at 20:41

## 2022-06-03 RX ADMIN — MORPHINE SULFATE 15 MG: 30 TABLET, FILM COATED, EXTENDED RELEASE ORAL at 20:44

## 2022-06-03 RX ADMIN — Medication 5 MG: at 20:39

## 2022-06-03 RX ADMIN — LAMOTRIGINE 125 MG: 100 TABLET ORAL at 22:28

## 2022-06-03 RX ADMIN — PANTOPRAZOLE SODIUM 40 MG: 40 TABLET, DELAYED RELEASE ORAL at 22:29

## 2022-06-03 RX ADMIN — APIXABAN 5 MG: 5 TABLET, FILM COATED ORAL at 20:39

## 2022-06-03 RX ADMIN — HYDROMORPHONE HYDROCHLORIDE 1 MG: 1 INJECTION, SOLUTION INTRAMUSCULAR; INTRAVENOUS; SUBCUTANEOUS at 08:38

## 2022-06-03 RX ADMIN — SODIUM CHLORIDE 1000 ML: 9 INJECTION, SOLUTION INTRAVENOUS at 09:18

## 2022-06-03 RX ADMIN — SODIUM CHLORIDE, PRESERVATIVE FREE 10 ML: 5 INJECTION INTRAVENOUS at 22:30

## 2022-06-03 RX ADMIN — OXYCODONE HYDROCHLORIDE AND ACETAMINOPHEN 1 TABLET: 5; 325 TABLET ORAL at 16:28

## 2022-06-03 RX ADMIN — DOCUSATE SODIUM 100 MG: 100 CAPSULE ORAL at 20:38

## 2022-06-03 SDOH — SOCIAL STABILITY - SOCIAL INSECURITY: DISSAPEARANCE AND DEATH OF FAMILY MEMBER: Z63.4

## 2022-06-03 ASSESSMENT — ENCOUNTER SYMPTOMS
ABDOMINAL PAIN: 1
VOMITING: 0
SHORTNESS OF BREATH: 0
WHEEZING: 0
NAUSEA: 1

## 2022-06-03 ASSESSMENT — PAIN DESCRIPTION - DESCRIPTORS
DESCRIPTORS: ACHING
DESCRIPTORS: ACHING;CRAMPING

## 2022-06-03 ASSESSMENT — PAIN SCALES - GENERAL
PAINLEVEL_OUTOF10: 10
PAINLEVEL_OUTOF10: 9
PAINLEVEL_OUTOF10: 9
PAINLEVEL_OUTOF10: 10

## 2022-06-03 ASSESSMENT — PAIN SCALES - WONG BAKER: WONGBAKER_NUMERICALRESPONSE: 0

## 2022-06-03 ASSESSMENT — PAIN DESCRIPTION - PAIN TYPE: TYPE: CHRONIC PAIN

## 2022-06-03 ASSESSMENT — PAIN DESCRIPTION - ONSET: ONSET: ON-GOING

## 2022-06-03 ASSESSMENT — PAIN - FUNCTIONAL ASSESSMENT: PAIN_FUNCTIONAL_ASSESSMENT: PREVENTS OR INTERFERES SOME ACTIVE ACTIVITIES AND ADLS

## 2022-06-03 ASSESSMENT — PAIN DESCRIPTION - LOCATION: LOCATION: ABDOMEN;BACK

## 2022-06-03 ASSESSMENT — PAIN DESCRIPTION - FREQUENCY: FREQUENCY: CONTINUOUS

## 2022-06-03 NOTE — ED PROVIDER NOTES
[facility]  ED  Emergency Department Encounter  EmergencyMedicine Resident     Pt Name:Breanna Higgins  MRN: [id number]  Armstrongfdebra 1965  Date of evaluation: 6/3/22  PCP:  S[facility] Ceron [id number]       Chief Complaint   Patient presents with    Fatigue       HISTORY OF PRESENT ILLNESS  (Location/Symptom, Timing/Onset, Context/Setting, Quality, Duration, Modifying Factors, Severity.)      López Pittman is a 59 y.o. female who presents with fatigue. Patient with stage IV ovarian cancer, actively on chemotherapy. States that she was too fatigued and had too much generalized weakness this morning to go to her chemotherapy session. Reports difficulty ambulating. No associated numbness or weakness however. Denies fevers, abdominal pain, nausea or vomiting but does report some constipation over the past 4 days. No bloody stools    PAST MEDICAL / SURGICAL / SOCIAL / FAMILY HISTORY      has a past medical history of Anemia, Bleeding, Cervical cancer ([address] Depression, Diabetes mellitus ([address] GERD (gastroesophageal reflux disease), Hx of blood clots, Hypertension, Metastatic cancer ([address] Ovarian cancer ([address] Post chemo evaluation, and Splenic lesion. has a past surgical history that includes Hysterectomy, total abdominal; Port Surgery; Tonsillectomy; IR PORT PLACEMENT > 5 YEARS (08/24/2020); Anus surgery; Abscess Drainage (2013); colectomy (03/2013); IR EMBOLIZATION HEMORRHAGE (10/05/2020); and Cardiac catheterization.       Social History     Socioeconomic History    Marital status: Single     Spouse name: Not on file    Number of children: Not on file    Years of education: Not on file    Highest education level: Not on file   Occupational History    Not on file   Tobacco Use    Smoking status: Current Every Day Smoker     Packs/day: 1.00     Years: 20.00     Pack years: 20.00     Types: Cigarettes    Smokeless tobacco: Never Used   Vaping Use    Vaping Use: release tablet TAKE 1 TABLET BY MOUTH 2 TIMES DAILY FOR 30 DAYS. 5/19/22 6/18/22  Mari Greer MD   oxyCODONE-acetaminophen (PERCOCET) 5-325 MG per tablet Take 1 tablet by mouth every 4 hours as needed for Pain for up to 30 days. 5/13/22 6/12/22  Kaylyn Bhatia MD   LORazepam (ATIVAN) 1 MG tablet Take 1 tablet by mouth every 8 hours as needed for Anxiety for up to 30 doses.  5/13/22 6/24/22  Mari Greer MD   ondansetron (ZOFRAN-ODT) 4 MG disintegrating tablet Take 1 tablet by mouth every 8 hours as needed for Nausea or Vomiting 5/13/22   Mair Greer MD   QUEtiapine (SEROQUEL) 25 MG tablet 1 TABLET ALONG WITH 50MG AT BEDTIME 5/9/22   Kaylyn Bhatia MD   pantoprazole (PROTONIX) 40 MG tablet TAKE 1 TABLET BY MOUTH DAILY 5/9/22   Mari Greer MD   senna (SENOKOT) 8.6 MG tablet Take 1 tablet by mouth 2 times daily 5/2/22 5/2/23  Krysta Lora MD   QUEtiapine (SEROQUEL) 50 MG tablet TAKE 1 TABLET BY MOUTH ONCE DAILY AT BEDTIME ALONG WITH 25MG 4/13/22   Mari Greer MD   melatonin 5 MG TABS tablet Take 1 tablet by mouth daily 4/13/22   Shanda MedhkoDO breanna   benzonatate (TESSALON PERLES) 100 MG capsule Take 1 capsule by mouth 3 times daily as needed for Cough 4/13/22   Shanda MedhkourDO   apixaban (ELIQUIS) 5 MG TABS tablet Take 1 tablet by mouth 2 times daily 4/8/22   Kaylyn Bhatia MD   sertraline (ZOLOFT) 100 MG tablet TAKE 1 TABLET BY MOUTH DAILY 3/21/22 4/20/22  Mari Greer MD   levETIRAcetam (KEPPRA) 1000 MG tablet Take 1 tablet by mouth 2 times daily 2/28/22   Geoffrey Baker MD   melatonin 5 MG TABS tablet Take 1 tablet by mouth nightly as needed (sleep) 2/28/22 4/13/22  Geoffrey Baker MD   hydrocortisone (ANUSOL-HC) 2.5 % CREA rectal cream Apply on affected area twice daily 2/28/22   Geoffrey Baker MD   lamoTRIgine (LAMICTAL) 25 MG tablet Take 5 tablets by mouth 2 times daily 2/28/22   Geoffrey Baker MD   ferrous sulfate (IRON 325) 325 (65 Fe) MG tablet Take 1 tablet by mouth daily (with breakfast) 10/31/21   Denzil Mcburney, MD       REVIEW OF SYSTEMS    (2-9 systems for level 4, 10 or more for level 5)      Review of Systems   Constitutional: Positive for fatigue. Negative for fever. Eyes: Negative for visual disturbance. Respiratory: Negative for shortness of breath and wheezing. Cardiovascular: Negative for chest pain. Gastrointestinal: Positive for abdominal pain and nausea. Negative for vomiting. Genitourinary: Negative for dysuria and flank pain. Musculoskeletal: Negative for neck pain and neck stiffness. Skin: Negative for rash. Neurological: Positive for weakness. Negative for numbness. Psychiatric/Behavioral: Negative for confusion. PHYSICAL EXAM   (up to 7 for level 4, 8 or more for level 5)      INITIAL VITALS:   /77   Pulse (!) 114   Temp 99.6 °F (37.6 °C) (Oral)   Resp 14   Wt 160 lb (72.6 kg)   SpO2 95%   BMI 26.63 kg/m²     Physical Exam  Constitutional:       General: She is not in acute distress. Appearance: She is ill-appearing. She is not toxic-appearing. HENT:      Head: Normocephalic. Eyes:      Extraocular Movements: Extraocular movements intact. Pupils: Pupils are equal, round, and reactive to light. Cardiovascular:      Rate and Rhythm: Normal rate. Heart sounds: No murmur heard. No friction rub. No gallop. Pulmonary:      Breath sounds: No wheezing, rhonchi or rales. Abdominal:      General: There is no distension. Tenderness: There is no abdominal tenderness. There is no guarding or rebound. Musculoskeletal:      Right lower leg: No edema. Left lower leg: No edema. Skin:     Coloration: Skin is pale. Findings: No bruising. Neurological:      Mental Status: She is alert. Sensory: No sensory deficit. Motor: No weakness.          DIFFERENTIAL  DIAGNOSIS     PLAN (LABS / IMAGING / EKG):  Orders Placed This Encounter   Procedures    XR CHEST PORTABLE    CT ABDOMEN PELVIS W IV CONTRAST Additional Contrast? None    CBC with Auto Differential    Comprehensive Metabolic Panel w/ Reflex to MG    Troponin    Urinalysis with Reflex to Culture    Hemoglobin and Hematocrit    Verify hospital blood product consent form has been signed and witnessed    Vital Signs For Blood Product Transfusion    Transfusion Reaction Management    Inpatient consult to IV Team    EKG 12 Lead    TYPE AND SCREEN    PREPARE RBC (CROSSMATCH), 1 Units       MEDICATIONS ORDERED:  Orders Placed This Encounter   Medications    DISCONTD: HYDROmorphone (DILAUDID) injection 1 mg    0.9 % sodium chloride bolus    HYDROmorphone (DILAUDID) injection 1 mg    0.9 % sodium chloride infusion       DDX: Anemia, dehydration, electrolyte abnormality, constipation, small bowel obstruction, ileus    DIAGNOSTIC RESULTS / EMERGENCY DEPARTMENT COURSE / MDM   LAB RESULTS:  Results for orders placed or performed during the hospital encounter of 06/03/22   CBC with Auto Differential   Result Value Ref Range    WBC 13.9 (H) 3.5 - 11.3 k/uL    RBC 2.42 (L) 3.95 - 5.11 m/uL    Hemoglobin 6.3 (LL) 11.9 - 15.1 g/dL    Hematocrit 23.9 (L) 36.3 - 47.1 %    MCV 98.8 82.6 - 102.9 fL    MCH 26.0 25.2 - 33.5 pg    MCHC 26.4 (L) 28.4 - 34.8 g/dL    RDW 19.7 (H) 11.8 - 14.4 %    Platelets 124 (H) 155 - 453 k/uL    MPV 9.1 8.1 - 13.5 fL    NRBC Automated 0.3 (H) 0.0 per 100 WBC    Immature Granulocytes 1 (H) 0 %    Seg Neutrophils 79 (H) 36 - 65 %    Lymphocytes 8 (L) 24 - 43 %    Monocytes 11 3 - 12 %    Eosinophils % 0 (L) 1 - 4 %    Basophils 1 0 - 2 %    Absolute Immature Granulocyte 0.14 0.00 - 0.30 k/uL    Segs Absolute 10.98 (H) 1.50 - 8.10 k/uL    Absolute Lymph # 1.11 1.10 - 3.70 k/uL    Absolute Mono # 1.53 (H) 0.10 - 1.20 k/uL    Absolute Eos # 0.00 0.00 - 0.44 k/uL    Basophils Absolute 0.14 0.00 - 0.20 k/uL    Morphology ANISOCYTOSIS PRESENT     Morphology MICROCYTOSIS PRESENT     Morphology HYPOCHROMIA PRESENT     Morphology 1+ POLYCHROMASIA    Comprehensive Metabolic Panel w/ Reflex to MG   Result Value Ref Range    Glucose 131 (H) 70 - 99 mg/dL    BUN 15 6 - 20 mg/dL    CREATININE 0.52 0.50 - 0.90 mg/dL    Calcium 7.6 (L) 8.6 - 10.4 mg/dL    Sodium 134 (L) 135 - 144 mmol/L    Potassium 3.6 (L) 3.7 - 5.3 mmol/L    Chloride 102 98 - 107 mmol/L    CO2 24 20 - 31 mmol/L    Anion Gap 8 (L) 9 - 17 mmol/L    Alkaline Phosphatase 112 (H) 35 - 104 U/L    ALT 7 5 - 33 U/L    AST 11 <32 U/L    Total Bilirubin <0.10 (L) 0.3 - 1.2 mg/dL    Total Protein 4.5 (L) 6.4 - 8.3 g/dL    Albumin 2.3 (L) 3.5 - 5.2 g/dL    Albumin/Globulin Ratio 1.0 1.0 - 2.5    GFR Non-African American >60 >60 mL/min    GFR African American >60 >60 mL/min    GFR Comment         Troponin   Result Value Ref Range    Troponin, High Sensitivity 11 0 - 14 ng/L   TYPE AND SCREEN   Result Value Ref Range    Expiration Date 06/06/2022,2359     Arm Band Number [id number]     ABO/Rh A POSITIVE     Antibody Screen NEGATIVE     Unit Number [id number]     Product Code Leukocyte Reduced Red Cell     Unit Divison 00     Dispense Status ALLOCATED     Transfusion Status OK TO TRANSFUSE     Crossmatch Result COMPATIBLE     Unit Number [id number]     Product Code Leukocyte Reduced Red Cell     Unit Divison 00     Dispense Status ALLOCATED     Transfusion Status OK TO TRANSFUSE     Crossmatch Result COMPATIBLE        IMPRESSION/ ED Course: Chronically ill-appearing 47-year-old female arriving with fatigue, generalized weakness. Patient actively receiving chemotherapy for stage IV ovarian cancer, missed her chemotherapy session today due to symptomatology. Patient appears fatigued, somewhat pale but otherwise in no acute distress. Mildly tachycardic with rate of 115-120. Vital signs otherwise in normal limits. Work-up remarkable for hemoglobin of 6.3, labs otherwise in normal limits.   Patient was tachycardic on arrival, given 1 unit PRBCs in emergency department with improvement in symptoms. CT abdomen/pelvis shows colonic distention consistent with chronic ileus versus chronic obstruction. Patient tolerated p.o. intake well in emergency department with no complaints of nausea and no vomiting. Spoke with [facility] for admission who states that they believe this patient can be admitted to observation service. Patient placed in's for GI consult tomorrow morning for chronic colonic distention, repeat CBC evaluate for further anemia    RADIOLOGY:  CT ABDOMEN PELVIS W IV CONTRAST Additional Contrast? None    Result Date: 6/3/2022  EXAMINATION: CT OF THE ABDOMEN AND PELVIS WITH CONTRAST 6/3/2022 12:02 pm TECHNIQUE: CT of the abdomen and pelvis was performed with the administration of intravenous contrast. Multiplanar reformatted images are provided for review. Automated exposure control, iterative reconstruction, and/or weight based adjustment of the mA/kV was utilized to reduce the radiation dose to as low as reasonably achievable. COMPARISON: CT from February 22 22 HISTORY: ORDERING SYSTEM PROVIDED HISTORY: Ovarian cancer, severe constipation, abdominal pain TECHNOLOGIST PROVIDED HISTORY: Ovarian cancer, severe constipation, abdominal pain Decision Support Exception - unselect if not a suspected or confirmed emergency medical condition->Emergency Medical Condition (MA) Reason for Exam: ovarian cancer, severe constipation, abd pain FINDINGS: Lower Chest: Redemonstration of multiple pulmonary nodules in the lung bases most significant in the left lower lobe but diffuse throughout the lower lobes. The largest pulmonary nodule measures 3.0 by 1.6 cm, not significantly changed from prior study. Possible partially seen mediastinal lymphadenopathy. Correlate with dedicated chest imaging if indicated. Organs: Liver: Normal appearance of the liver. Gallbladder: The gallbladder is distended with a large gallstone near the gallbladder neck.   No significant biliary ductal dilatation. Spleen: Chronic appearing calcifications near the spleen. Pancreas: No peripancreatic inflammatory changes. Adrenal Glands: Streak artifact limits evaluation of the left adrenal gland. No convincing focal abnormality of the adrenal glands with this limitation. Kidneys: Chronic dilatation of the right renal pelvis and proximal ureter, unchanged and favoring pelviectasis without hydronephrosis. No obstructing stone identified. No left-sided hydronephrosis. GI/Bowel: Stomach: The stomach is displaced by diffusely distended colon. Small bowel: No evidence of small bowel obstruction. Colon: Diffuse stool-filled dilated colon most significant at the transverse colon. Some wall thickening and inflammatory changes most notable at the rectosigmoid colon where there is a segment of narrowing measuring approximately 5.3 cm on page 150 of series 2. This is similar or improved from prior study. Gabi Dahl Appendix: Normal appearance of the appendix. Pelvis: Trace free fluid in the pelvis. Distended urinary bladder. Catheter projects terminating in the mid lower abdomen. Peritoneum/Retroperitoneum: Trace free fluid in the abdomen. No pneumoperitoneum. Traversing catheter as above. Normal caliber aorta with atherosclerosis. Few mildly prominent and some calcified retroperitoneal lymph nodes, unchanged. Masslike soft tissue density within the pelvic peritoneum with some calcification is unchanged when considering differences in measurement technique. Largest left inguinal lymph node measures 17 x 22 mm, similar or slightly improved from prior study when it measured up to 20 x 27 mm Bones/Soft Tissues: Diffuse subcutaneous soft tissue edema increased compared to the prior study and most significant in the left proximal thigh. Diffuse osteoblastic metastatic disease redemonstrated in the bony pelvis and visualized spine     Worsened diffuse colonic distension with stool and air-filled colon and wall thickening. Suggestion of a segment of narrowing of the rectosigmoid colon redemonstrated, similar or slightly improved from prior study. Chronic colonic obstruction/stricture of versus [place] syndrome are considerations. Correlate with direct visualization if indicated. Unchanged masslike partially calcified lesion in the pelvis may be compatible with known history of malignancy or post treatment changes in this location. mild improved left inguinal lymphadenopathy, otherwise stable retroperitoneal lymphadenopathy with some partially calcified lymph nodes. Worsened subcutaneous soft tissue edema most significant at the left lower proximal thigh could be related to lymphedema. Stable osseous metastatic disease. XR CHEST PORTABLE    Result Date: 6/3/2022  EXAMINATION: ONE XRAY VIEW OF THE CHEST 6/3/2022 9:30 am COMPARISON: May 3, 2022 chest exam HISTORY: ORDERING SYSTEM PROVIDED HISTORY: Fatigue, ovarian cancer with pulmonary mets TECHNOLOGIST PROVIDED HISTORY: Fatigue, ovarian cancer with pulmonary mets Reason for Exam: uprt port FINDINGS: Stable right IJ port catheter with tip projected at the SVC Stable cardiac silhouette and moderate to marked asymmetric elevation right hemidiaphragm There is mild prominence of interstitial markings. There is stable minimal streaky right basilar density. No significant pleural process     Stable support line Mild prominence of interstitial markings suggests mild vascular congestion     US DUP LOWER EXTREMITY LEFT OSCAR    Addendum Date: 5/13/2022    ADDENDUM: Critical results were called by Dr. Keon Mullins MD to Dr Carmella Felix on 5/13/2022 at 18:20. Result Date: 5/13/2022  EXAMINATION: DUPLEX VENOUS ULTRASOUND OF THE LEFT LOWER EXTREMITY 5/13/2022 3:53 pm TECHNIQUE: Duplex ultrasound using B-mode/gray scaled imaging and Doppler spectral analysis and color flow was obtained of the deep venous structures of the left lower extremity. COMPARISON: None.  HISTORY: ORDERING SYSTEM PROVIDED HISTORY: Cancer, metastatic to bone [facility]) FINDINGS: Within the proximal to mid femoral vein on grayscale images there is suggestion of a clot and there is loss of flow on Doppler images. Findings are likely suggestive of a clot within this area. The remainder of the visualized veins of the left lower extremity are patent and free of echogenic thrombus. The veins demonstrate good compressibility with normal color flow study and spectral analysis. Findings suggestive of thrombosis within the proximal to mid femoral vein. The findings were sent to the Radiology Results [address] at 5:53 pm on 5/13/2022 to be communicated to a licensed caregiver. RECOMMENDATIONS: Unavailable       EKG  EKG Interpretation    Interpreted by me    Rhythm: Sinus tachycardia  Rate: 119 bpm  Axis: normal  Ectopy: none  Conduction: normal  ST Segments: no acute change  T Waves: no acute change  Q Waves: none    Clinical Impression: Sinus tachycardia, nonspecific EKG    All EKG's are interpreted by the Emergency Department Physician who either signs or Co-signs this chart in the absence of a cardiologist.      CONSULTS:  IP CONSULT TO IV TEAM    CRITICAL CARE:  Please see attending note    FINAL IMPRESSION      Anemia due to chronic disease    DISPOSITION / PLAN     DISPOSITION        PATIENT REFERRED TO:  No follow-up provider specified.     DISCHARGE MEDICATIONS:  New Prescriptions    No medications on file       Katherne Goldberg, DO  Emergency Medicine Resident    (Please note that portions of thisnote were completed with a voice recognition program.  Efforts were made to edit the dictations but occasionally words are mis-transcribed.)        Katherne Goldberg, DO  Resident  06/03/22 1500 [facility], DO  Resident  06/16/22 [id number]

## 2022-06-03 NOTE — ED NOTES
Pt to room 22 via EMS with c/o fatigue. Pt reports that she has been diagnosed with end stage ovarian cancer. Pt was on her way to chemo today and felt too weak and fatigued to get in the car to go to her treatment. Pt reports that they called EMS and they brought her to ED for evaluation. Patient placed on continuous cardiac monitor, bp and pulse ox. Will continue plan of care.       Katerin Sultana RN  06/03/22 [id number]

## 2022-06-03 NOTE — PLAN OF CARE
Problem: Chronic Conditions and Co-morbidities  Goal: Patient's chronic conditions and co-morbidity symptoms are monitored and maintained or improved  Outcome: Progressing  Flowsheets (Taken 6/3/2022 1530)  Care Plan - Patient's Chronic Conditions and Co-Morbidity Symptoms are Monitored and Maintained or Improved: Monitor and assess patient's chronic conditions and comorbid symptoms for stability, deterioration, or improvement     Problem: Pain  Goal: Verbalizes/displays adequate comfort level or baseline comfort level  Outcome: Progressing

## 2022-06-03 NOTE — CARE COORDINATION
Case Management Initial Discharge Plan  [facility]ac[facility]ia Je[facility],             Met with:patient to discuss discharge plans. Information verified: address, contacts, phone number, , insurance Yes  Insurance Provider: Arturo Grubbs:     Emergency Contact/Next of Kin name & number: Jerry Cherry (son) [phone]  Who are involved in patient's support system? family    PCP: Jorge Cortés DO  Date of last visit: couple weeks ago      Discharge Planning    Living Arrangements:    with son Will    Home has 2 stories   stairs to climb to get into front door, stairs to climb to reach second floor  Location of bedroom/bathroom in home main    Patient able to perform ADL's:Independent    Current Services (outpatient & in home)   DME equipment: wheelchair  DME provider:     Is patient receiving oral anticoagulation therapy? Yes, Eliquis    If indicated:   Physician managing anticoagulation treatment:   Where does patient obtain lab work for ATC treatment? Does patient have any issues/concerns obtaining medications? No  If yes, what are patient's concerns? Is there a preferred Pharmacy after hours or on weekends? Yes    If yes, which pharmacy? [facility]    Potential Assistance Needed:       Patient agreeable to home care: No  [place] of choice provided:  no    Prior SNF/Rehab Placement and Facility: no  Agreeable to SNF/Rehab: No  [place] of choice provided: no     Evaluation: no    Expected Discharge date:       Patient expects to be discharged to: If home: is the family and/or caregiver wiling & able to provide support at home? Who will be providing this support?      Follow Up Appointment: Best Day/ Time:      Transportation provider: family  Transportation arrangements needed for discharge: No    Readmission Risk              Risk of Unplanned Readmission:  0             Does patient have a readmission risk score greater than 14?:   If yes, follow-up appointment must be made within 7 days of discharge.      Goals of Care:       Educated patient on transitional options, provided freedom of choice and are agreeable with plan      Discharge Plan: return home with son          Electronically signed by Rosa Maria Gilmore RN on 6/3/22 at 12:38 PM EDT

## 2022-06-03 NOTE — ED PROVIDER NOTES
[facility]     Emergency Department     Faculty Attestation    I performed a history and physical examination of the patient and discussed management with the resident. I reviewed the residents note and agree with the documented findings and plan of care. Any areas of disagreement are noted on the chart. I was personally present for the key portions of any procedures. I have documented in the chart those procedures where I was not present during the key portions. I have reviewed the emergency nurses triage note. I agree with the chief complaint, past medical history, past surgical history, allergies, medications, social and family history as documented unless otherwise noted below. For Physician Assistant/ Nurse Practitioner cases/documentation I have personally evaluated this patient and have completed at least one if not all key elements of the E/M (history, physical exam, and MDM). Additional findings are as noted.       Primary Care Physician:  Juana Pale, DO    CHIEF COMPLAINT       Chief Complaint   Patient presents with    Fatigue       RECENT VITALS:   Temp: 99.6 °F (37.6 °C),  Heart Rate: (!) 119, Resp: 18, BP: 132/77    LABS:  Labs Reviewed   CBC WITH AUTO DIFFERENTIAL   COMPREHENSIVE METABOLIC PANEL W/ REFLEX TO MG FOR LOW K   TROPONIN   TROPONIN   URINALYSIS WITH REFLEX TO CULTURE     EKG shows sinus tachycardia rate of 119 bpm MT interval is 160 ms QRS durations 80 ms QT corrected 441 ms axis is 94 patient has nonspecific ST-T wave changes there is no acute ST elevation seen    PERTINENT ATTENDING PHYSICIAN COMMENTS:    Patient here with increasing fatigue and weakness history of end-stage ovarian cancer due for chemo today she had a history of anemia as well patient said she has had no appetite she is been try to keep up on her fluids has been no fevers chills or cough    Farrah Peres MD,  MD, [facility]  Attending Emergency Physician              Sharee Atwood MD  06/03/22 1272

## 2022-06-03 NOTE — CONSULTS
Today's Date: 6/3/2022  Patient Name: Shara Mccain  Date of admission: 6/3/2022  8:21 AM  Patient's age: 59 y.o., 1965  Admission Dx: Anemia [D64.9]  Anemia, unspecified type [D64.9]    Reason for Consult: management recommendations  Requesting Physician: Allison Jules MD    CHIEF COMPLAINT: Fatigue/patient on chemotherapy    History Obtained From:  patient    HISTORY OF PRESENT ILLNESS:      The patient is a 59 y.o.  female who is admitted to the hospital for severe fatigue she was supposed to get her chemotherapy for stage IV ovarian cancer today and reported difficulty ambulating no fever or chills abdominal pain nausea vomiting she has constipation and she was admitted for observation, hemoglobin at 6.3 with a platelet at 873 and WBC 13.9    Oncology history as below       DIAGNOSIS:       Recurrent ovarian cancer. Original diagnosis 2005 with multiple recurrences. Diffuse metastasis. CURRENT THERAPY:         Doxil/ Avastin started 9/18/2020. Avastin was discontinued due to recent GI bleeding. Status post recent vascular embolization  S/p seizure disorder. Gemzar started 2/26/2021. Interrupted due to hospitalization and problem with compliance. Evidence of disease progression on CT scan 2/22/22  Add Carboplatin to Gemzar March 2022        Past Medical History:   has a past medical history of Anemia, Bleeding, Cervical cancer ([address] Depression, Diabetes mellitus ([address] GERD (gastroesophageal reflux disease), Hx of blood clots, Hypertension, Metastatic cancer ([address] Ovarian cancer ([address] Post chemo evaluation, and Splenic lesion. Past Surgical History:   has a past surgical history that includes Hysterectomy, total abdominal; Port Surgery; Tonsillectomy; IR PORT PLACEMENT > 5 YEARS (08/24/2020); Anus surgery; Abscess Drainage (2013); colectomy (03/2013); IR EMBOLIZATION HEMORRHAGE (10/05/2020); and Cardiac catheterization.      Medications:    Reviewed in Epic     Allergies: Ceftriaxone    Social History:   reports that she has been smoking cigarettes. She has a 20.00 pack-year smoking history. She has never used smokeless tobacco. She reports previous alcohol use. She reports that she does not use drugs. Family History: family history includes Alcohol Abuse in her mother; Cirrhosis in her mother. REVIEW OF SYSTEMS:    Constitutional: No fever or chills. No night sweats,+weight loss ,+ fatigue  Eyes: No eye discharge, double vision, or eye pain   HEENT: negative for sore mouth, sore throat, hoarseness and voice change   Respiratory: negative for cough , sputum, dyspnea, wheezing, hemoptysis, chest pain   Cardiovascular: negative for chest pain, dyspnea, palpitations, orthopnea, PND   Gastrointestinal: negative for nausea, vomiting, diarrhea, constipation, abdominal pain, Dysphagia, hematemesis and hematochezia   Genitourinary: negative for frequency, dysuria, nocturia, urinary incontinence, and hematuria   Integument: negative for rash, skin lesions, bruises.    Hematologic/Lymphatic: negative for easy bruising, bleeding, lymphadenopathy, or petechiae   Endocrine: negative for heat or cold intolerance,weight changes, change in bowel habits and hair loss   Musculoskeletal: negative for myalgias, arthralgias, pain, joint swelling,and bone pain   Neurological: negative for headaches, dizziness, seizures, weakness, numbness    PHYSICAL EXAM:      /72   Pulse (!) 103   Temp 98.2 °F (36.8 °C) (Oral)   Resp 16   Wt 160 lb (72.6 kg)   SpO2 96%   BMI 26.63 kg/m²    Temp (24hrs), Av.4 °F (36.9 °C), Min:97.8 °F (36.6 °C), Max:99.6 °F (37.6 °C)    General appearance -ill looking2  Mental status - alert and cooperative   Eyes - pupils equal and reactive, extraocular eye movements intact   Ears - bilateral TM's and external ear canals normal   Mouth - mucous membranes moist, pharynx normal without lesions   Neck - supple, no significant adenopathy   Lymphatics - no palpable markings suggests mild vascular congestion             Primary Problem  Anemia    Active Hospital Problems    Diagnosis Date Noted    Anemia [D64.9]          IMPRESSION:   1. Severe anemia related to chemotherapy/myelosuppression at the time of anemia of chronic disease/cancer  2. Recurrent metastatic ovarian cancer  3. Severe weakness fatigue due to above  4. Thrombocytosis likely reactive  5. Leukocytosis  6. Constipation/concern about bowel obstruction with abnormal CT of the abdomen      RECOMMENDATIONS:  1. I reviewed the laboratory data, imaging studies, discussed the diagnosis and possible treatment   2. Blood transfusion to keep hemoglobin above 7  3. Surgical evaluation for possible early small bowel obstruction  4.   5. Follow-up with oncology after discharge  6. Monitor for bleed and anemia work-up      Discussed with patient and Nurse. Thank you for asking us to see this patient. [facility]                            This note is created with the assistance of a speech recognition program.  While intending to generate a document that actually reflects the content of the visit, the document can still have some errors including those of syntax and sound a like substitutions which may escape proof reading. It such instances, actual meaning can be extrapolated by contextual diversion.      Hematologist/Medical Oncologist

## 2022-06-04 PROBLEM — C57.9 GYNECOLOGIC CANCER (HCC): Status: ACTIVE | Noted: 2021-05-29

## 2022-06-04 LAB
ABSOLUTE EOS #: 0.13 K/UL (ref 0–0.4)
ABSOLUTE IMMATURE GRANULOCYTE: 0 K/UL (ref 0–0.3)
ABSOLUTE LYMPH #: 2.08 K/UL (ref 1–4.8)
ABSOLUTE MONO #: 1.43 K/UL (ref 0.1–0.8)
ABSOLUTE RETIC #: 0.14 M/UL (ref 0.03–0.08)
BASOPHILS # BLD: 0 % (ref 0–2)
BASOPHILS ABSOLUTE: 0 K/UL (ref 0–0.2)
CA 125: 338 U/ML
EKG ATRIAL RATE: 119 BPM
EKG P AXIS: 72 DEGREES
EKG P-R INTERVAL: 160 MS
EKG Q-T INTERVAL: 314 MS
EKG QRS DURATION: 80 MS
EKG QTC CALCULATION (BAZETT): 441 MS
EKG R AXIS: 94 DEGREES
EKG T AXIS: 112 DEGREES
EKG VENTRICULAR RATE: 119 BPM
EOSINOPHILS RELATIVE PERCENT: 1 % (ref 1–4)
FERRITIN: 33 NG/ML (ref 13–150)
FOLATE: 8.7 NG/ML
HCT VFR BLD CALC: 24.6 % (ref 36.3–47.1)
HEMOGLOBIN: 7.3 G/DL (ref 11.9–15.1)
IMMATURE GRANULOCYTES: 0 %
IMMATURE RETIC FRACT: 38.1 % (ref 2.7–18.3)
IRON SATURATION: 6 % (ref 20–55)
IRON: 8 UG/DL (ref 37–145)
LYMPHOCYTES # BLD: 16 % (ref 24–44)
MCH RBC QN AUTO: 28 PG (ref 25.2–33.5)
MCHC RBC AUTO-ENTMCNC: 29.7 G/DL (ref 28.4–34.8)
MCV RBC AUTO: 94.3 FL (ref 82.6–102.9)
MONOCYTES # BLD: 11 % (ref 1–7)
MORPHOLOGY: ABNORMAL
MORPHOLOGY: ABNORMAL
NRBC AUTOMATED: 0.6 PER 100 WBC
PDW BLD-RTO: 19.8 % (ref 11.8–14.4)
PLATELET # BLD: 577 K/UL (ref 138–453)
PMV BLD AUTO: 9.3 FL (ref 8.1–13.5)
RBC # BLD: 2.61 M/UL (ref 3.95–5.11)
RETIC %: 5.2 % (ref 0.5–1.9)
RETIC HEMOGLOBIN: 22.3 PG (ref 28.2–35.7)
SEG NEUTROPHILS: 72 % (ref 36–66)
SEGMENTED NEUTROPHILS ABSOLUTE COUNT: 9.36 K/UL (ref 1.8–7.7)
TOTAL IRON BINDING CAPACITY: 131 UG/DL (ref 250–450)
UNSATURATED IRON BINDING CAPACITY: 123 UG/DL (ref 112–347)
VITAMIN B-12: 654 PG/ML (ref 232–1245)
WBC # BLD: 13 K/UL (ref 3.5–11.3)

## 2022-06-04 PROCEDURE — 99253 IP/OBS CNSLTJ NEW/EST LOW 45: CPT | Performed by: NURSE PRACTITIONER

## 2022-06-04 PROCEDURE — 85045 AUTOMATED RETICULOCYTE COUNT: CPT

## 2022-06-04 PROCEDURE — 82746 ASSAY OF FOLIC ACID SERUM: CPT

## 2022-06-04 PROCEDURE — 2580000003 HC RX 258: Performed by: STUDENT IN AN ORGANIZED HEALTH CARE EDUCATION/TRAINING PROGRAM

## 2022-06-04 PROCEDURE — 2580000003 HC RX 258: Performed by: INTERNAL MEDICINE

## 2022-06-04 PROCEDURE — 85025 COMPLETE CBC W/AUTO DIFF WBC: CPT

## 2022-06-04 PROCEDURE — 99232 SBSQ HOSP IP/OBS MODERATE 35: CPT | Performed by: INTERNAL MEDICINE

## 2022-06-04 PROCEDURE — 1200000000 HC SEMI PRIVATE

## 2022-06-04 PROCEDURE — 82728 ASSAY OF FERRITIN: CPT

## 2022-06-04 PROCEDURE — 6370000000 HC RX 637 (ALT 250 FOR IP)

## 2022-06-04 PROCEDURE — 86304 IMMUNOASSAY TUMOR CA 125: CPT

## 2022-06-04 PROCEDURE — 82607 VITAMIN B-12: CPT

## 2022-06-04 PROCEDURE — 6370000000 HC RX 637 (ALT 250 FOR IP): Performed by: STUDENT IN AN ORGANIZED HEALTH CARE EDUCATION/TRAINING PROGRAM

## 2022-06-04 PROCEDURE — 36415 COLL VENOUS BLD VENIPUNCTURE: CPT

## 2022-06-04 PROCEDURE — G0378 HOSPITAL OBSERVATION PER HR: HCPCS

## 2022-06-04 PROCEDURE — 6360000002 HC RX W HCPCS: Performed by: INTERNAL MEDICINE

## 2022-06-04 PROCEDURE — 93010 ELECTROCARDIOGRAM REPORT: CPT | Performed by: INTERNAL MEDICINE

## 2022-06-04 PROCEDURE — 83540 ASSAY OF IRON: CPT

## 2022-06-04 PROCEDURE — 83550 IRON BINDING TEST: CPT

## 2022-06-04 RX ORDER — OXYCODONE HYDROCHLORIDE 5 MG/1
2.5 TABLET ORAL ONCE
Status: COMPLETED | OUTPATIENT
Start: 2022-06-04 | End: 2022-06-04

## 2022-06-04 RX ORDER — BISACODYL 10 MG
10 SUPPOSITORY, RECTAL RECTAL DAILY PRN
Status: DISCONTINUED | OUTPATIENT
Start: 2022-06-04 | End: 2022-06-13 | Stop reason: HOSPADM

## 2022-06-04 RX ORDER — BISACODYL 10 MG
10 SUPPOSITORY, RECTAL RECTAL 3 TIMES DAILY PRN
Status: DISCONTINUED | OUTPATIENT
Start: 2022-06-04 | End: 2022-06-13 | Stop reason: HOSPADM

## 2022-06-04 RX ADMIN — SENNOSIDES 8.6 MG: 8.6 TABLET, COATED ORAL at 20:37

## 2022-06-04 RX ADMIN — MORPHINE SULFATE 30 MG: 30 TABLET, FILM COATED, EXTENDED RELEASE ORAL at 20:39

## 2022-06-04 RX ADMIN — LAMOTRIGINE 125 MG: 100 TABLET ORAL at 20:37

## 2022-06-04 RX ADMIN — LEVETIRACETAM 1000 MG: 500 TABLET, FILM COATED ORAL at 09:38

## 2022-06-04 RX ADMIN — QUETIAPINE FUMARATE 50 MG: 25 TABLET ORAL at 00:05

## 2022-06-04 RX ADMIN — ONDANSETRON 4 MG: 4 TABLET, ORALLY DISINTEGRATING ORAL at 20:39

## 2022-06-04 RX ADMIN — LORAZEPAM 1 MG: 0.5 TABLET ORAL at 17:03

## 2022-06-04 RX ADMIN — OXYCODONE HYDROCHLORIDE 2.5 MG: 5 TABLET ORAL at 11:09

## 2022-06-04 RX ADMIN — APIXABAN 5 MG: 5 TABLET, FILM COATED ORAL at 09:38

## 2022-06-04 RX ADMIN — IRON SUCROSE 300 MG: 20 INJECTION, SOLUTION INTRAVENOUS at 17:06

## 2022-06-04 RX ADMIN — MORPHINE SULFATE 15 MG: 30 TABLET, FILM COATED, EXTENDED RELEASE ORAL at 20:38

## 2022-06-04 RX ADMIN — SENNOSIDES 8.6 MG: 8.6 TABLET, COATED ORAL at 09:38

## 2022-06-04 RX ADMIN — MORPHINE SULFATE 30 MG: 30 TABLET, FILM COATED, EXTENDED RELEASE ORAL at 09:39

## 2022-06-04 RX ADMIN — PANTOPRAZOLE SODIUM 40 MG: 40 TABLET, DELAYED RELEASE ORAL at 09:38

## 2022-06-04 RX ADMIN — SERTRALINE 100 MG: 50 TABLET, FILM COATED ORAL at 09:38

## 2022-06-04 RX ADMIN — APIXABAN 5 MG: 5 TABLET, FILM COATED ORAL at 20:37

## 2022-06-04 RX ADMIN — OXYCODONE HYDROCHLORIDE AND ACETAMINOPHEN 1 TABLET: 5; 325 TABLET ORAL at 17:02

## 2022-06-04 RX ADMIN — OXYCODONE HYDROCHLORIDE AND ACETAMINOPHEN 1 TABLET: 5; 325 TABLET ORAL at 06:11

## 2022-06-04 RX ADMIN — FERROUS SULFATE TAB EC 325 MG (65 MG FE EQUIVALENT) 325 MG: 325 (65 FE) TABLET DELAYED RESPONSE at 09:38

## 2022-06-04 RX ADMIN — QUETIAPINE FUMARATE 50 MG: 25 TABLET ORAL at 20:37

## 2022-06-04 RX ADMIN — DOCUSATE SODIUM 100 MG: 100 CAPSULE ORAL at 09:38

## 2022-06-04 RX ADMIN — LAMOTRIGINE 125 MG: 100 TABLET ORAL at 09:38

## 2022-06-04 RX ADMIN — MORPHINE SULFATE 15 MG: 30 TABLET, FILM COATED, EXTENDED RELEASE ORAL at 09:40

## 2022-06-04 RX ADMIN — Medication 5 MG: at 20:39

## 2022-06-04 RX ADMIN — LEVETIRACETAM 1000 MG: 500 TABLET, FILM COATED ORAL at 20:37

## 2022-06-04 RX ADMIN — SODIUM CHLORIDE, PRESERVATIVE FREE 10 ML: 5 INJECTION INTRAVENOUS at 09:41

## 2022-06-04 RX ADMIN — SODIUM CHLORIDE, PRESERVATIVE FREE 10 ML: 5 INJECTION INTRAVENOUS at 21:00

## 2022-06-04 ASSESSMENT — PAIN SCALES - GENERAL
PAINLEVEL_OUTOF10: 0
PAINLEVEL_OUTOF10: 0
PAINLEVEL_OUTOF10: 9
PAINLEVEL_OUTOF10: 0
PAINLEVEL_OUTOF10: 8
PAINLEVEL_OUTOF10: 0
PAINLEVEL_OUTOF10: 0
PAINLEVEL_OUTOF10: 7
PAINLEVEL_OUTOF10: 8
PAINLEVEL_OUTOF10: 0
PAINLEVEL_OUTOF10: 0
PAINLEVEL_OUTOF10: 9
PAINLEVEL_OUTOF10: 0

## 2022-06-04 ASSESSMENT — PAIN SCALES - WONG BAKER

## 2022-06-04 ASSESSMENT — PAIN DESCRIPTION - LOCATION
LOCATION: ABDOMEN;BACK

## 2022-06-04 ASSESSMENT — PAIN - FUNCTIONAL ASSESSMENT
PAIN_FUNCTIONAL_ASSESSMENT: ACTIVITIES ARE NOT PREVENTED
PAIN_FUNCTIONAL_ASSESSMENT: ACTIVITIES ARE NOT PREVENTED

## 2022-06-04 ASSESSMENT — PAIN DESCRIPTION - PAIN TYPE
TYPE: CHRONIC PAIN
TYPE: CHRONIC PAIN

## 2022-06-04 ASSESSMENT — PAIN DESCRIPTION - FREQUENCY
FREQUENCY: CONTINUOUS
FREQUENCY: CONTINUOUS

## 2022-06-04 ASSESSMENT — PAIN DESCRIPTION - ORIENTATION
ORIENTATION: RIGHT;LEFT;ANTERIOR;LOWER
ORIENTATION: RIGHT;LEFT;ANTERIOR

## 2022-06-04 ASSESSMENT — PAIN DESCRIPTION - ONSET
ONSET: ON-GOING
ONSET: ON-GOING

## 2022-06-04 ASSESSMENT — PAIN DESCRIPTION - DESCRIPTORS
DESCRIPTORS: ACHING;CRAMPING
DESCRIPTORS: ACHING;CRAMPING

## 2022-06-04 NOTE — PLAN OF CARE
Problem: Chronic Conditions and Co-morbidities  Goal: Patient's chronic conditions and co-morbidity symptoms are monitored and maintained or improved  2022 1135 by Pankaj Lazo RN  Outcome: Progressing  2022 001 by Sneha Shelton RN  Outcome: Progressing  Flowsheets (Taken 6/3/2022 1900)  Care Plan - Patient's Chronic Conditions and Co-Morbidity Symptoms are Monitored and Maintained or Improved: Monitor and assess patient's chronic conditions and comorbid symptoms for stability, deterioration, or improvement     Problem: Pain  Goal: Verbalizes/displays adequate comfort level or baseline comfort level  2022 1135 by Pankaj Lazo RN  Outcome: Progressing  2022 001 by Sneha Shelton RN  Outcome: Progressing     Problem: Skin/Tissue Integrity  Goal: Absence of new skin breakdown  20225 by Pankaj Lazo RN  Outcome: Progressing  2022 by Sneha Shelton RN  Outcome: Progressing     Problem: Safety - Adult  Goal: Free from fall injury  2022 1135 by Pankaj Lazo RN  Outcome: Progressing  2022 001 by Sneha Shelton RN  Outcome: Progressing     Problem: ABCDS Injury Assessment  Goal: Absence of physical injury  2022 by Pankaj Lazo RN  Outcome: Progressing  2022 by Sneha Shelton RN  Outcome: Progressing

## 2022-06-04 NOTE — PROGRESS NOTES
CDU Daily Progress Note  Attending Physician       Pt Name: Vandana Quinn  MRN: [id number]  [place] 1965  Date of evaluation: 6/4/22    I performed a history and physical examination of the patient and discussed management with the resident. I reviewed the residents note and agree with the documented findings and plan of care. Any areas of disagreement are noted on the chart. I was personally present for the key portions of any procedures. I have documented in the chart those procedures where I was not present during the key portions. I have reviewed the emergency nurses triage note. I agree with the chief complaint, past medical history, past surgical history, allergies, medications, social and family history as documented unless otherwise noted below. Documentation of the HPI, Physical Exam and Medical Decision Making performed by medical students or scribes is based on my personal performance of the HPI, PE and MDM. For Physician Assistant/ Nurse Practitioner cases/documentation I have personally evaluated this patient and have completed at least one if not all key elements of the E/M (history, physical exam, and MDM). Additional findings are as noted. The Family History, Social History and Review of Systems are unchanged from the previous day. No significant events overnight. Patient relates her pain meds really do not seem to be helping. However the nurse relates that she has not had a bowel movement since she has been here. She has been given medications for this purpose and we will see if pain improves after bowel movement. Her hemoglobin has stabilized to 7.3 and oncology does not recommend transfusing again unless she drops below 7. Awaiting GI recommendations and input for CT findings of ileus versus chronic obstruction. Appreciate GI input for any other further recommendations.     Farida Mckeon MD,  MD  Attending Physician  Critical Decision Unit

## 2022-06-04 NOTE — PROGRESS NOTES
6/3/22 10:24 PM oncall notified via perfect serve     [phone] From: Harsha Leavitt [address] [place] [address] RE: [address] [facility] room 343-2 liudmila has sepsis notification of 5.83 just now. also liudmila family is asking about left leg pain and if anyone has talked to her aout a possible DVT of left leg. left leg is significatntly more swollen and painful. also asking about compression EPC cuffs? Read 10:24 PM  6/3/22 11:50 PM  Will evaluate thank you    Will continue to monitor and look for further orders to be placed.

## 2022-06-04 NOTE — H&P
[facility]  CDU / OBSERVATION eNCOUnter  Resident Note     Pt Name: Prince Lester  MRN: [id number]  [place] 1965  Date of evaluation: 6/4/22  Patient's PCP is :  Rosanne Mckeon, [facility]       Chief Complaint   Patient presents with    Fatigue         HISTORY OF PRESENT ILLNESS    Prince Lester is a 59 y.o. female who presents with fatigue, stage IV ovarian cancer, actively on chemotherapy. Notes she missed her chemotherapy  yesterday morning due to generalized weakness. Did note some constipation over the past 4 days. Hemoglobin was 6.3 and was given 1 unit PRBC in ED. Patient was noted to be tachycardic at that time. CT abdomen pelvis showed colonic distention consistent with chronic ileus versus chronic obstruction. Patient placed in observation unit for GI consult, repeat CBC. Patient notes generalized abdominal pain that is her usual pain this morning. Is tolerating p.o. No chest pain, vomiting. No lightheadedness. REVIEW OF SYSTEMS       General ROS - No fevers, No malaise   Ophthalmic ROS - No discharge, No changes in vision  ENT ROS -  No sore throat, No rhinorrhea,   Respiratory ROS - no shortness of breath, no cough, no  wheezing  Cardiovascular ROS - No chest pain, no dyspnea on exertion  Gastrointestinal ROS -+ abdominal pain, no nausea or vomiting, no change in bowel habits, no black or bloody stools  Genito-Urinary ROS - No dysuria, trouble voiding, or hematuria  Musculoskeletal ROS - No myalgias, No arthalgias  Neurological ROS - No headache, no dizziness/lightheadedness, No focal weakness, no loss of sensation  Dermatological ROS - No lesions, No rash     (PQRS) Advance directives on face sheet per hospital policy.  No change unless specifically mentioned in chart    PAST MEDICAL HISTORY    has a past medical history of Anemia, Bleeding, Cervical cancer ([address] Depression, Diabetes mellitus ([address] GERD (gastroesophageal reflux disease), Hx of blood clots, Hypertension, Metastatic cancer ([facility] Ovarian cancer ([facility] Post chemo evaluation, and Splenic lesion. I have reviewed the past medical history with the patient and it is pertinent to this complaint. SURGICAL HISTORY      has a past surgical history that includes Hysterectomy, total abdominal; Port Surgery; Tonsillectomy; IR PORT PLACEMENT > 5 YEARS (08/24/2020); Anus surgery; Abscess Drainage (2013); colectomy (03/2013); IR EMBOLIZATION HEMORRHAGE (10/05/2020); and Cardiac catheterization. I have reviewed and agree with Surgical History entered and it is pertinent to this complaint. CURRENT MEDICATIONS     0.9 % sodium chloride infusion, PRN  apixaban (ELIQUIS) tablet 5 mg, BID  benzonatate (TESSALON) capsule 100 mg, TID PRN  ferrous sulfate (FE TABS 325) EC tablet 325 mg, Daily with breakfast  hydrocortisone (ANUSOL-HC) 2.5 % rectal cream, BID  lamoTRIgine (LAMICTAL) tablet 125 mg, BID  levETIRAcetam (KEPPRA) tablet 1,000 mg, BID  LORazepam (ATIVAN) tablet 1 mg, Q8H PRN  melatonin tablet 5 mg, Nightly PRN  morphine (MS CONTIN) extended release tablet 15 mg, BID  morphine (MS CONTIN) extended release tablet 30 mg, BID  ondansetron (ZOFRAN-ODT) disintegrating tablet 4 mg, Q8H PRN  oxyCODONE-acetaminophen (PERCOCET) 5-325 MG per tablet 1 tablet, Q4H PRN  pantoprazole (PROTONIX) tablet 40 mg, Daily  QUEtiapine (SEROQUEL) tablet 25 mg, Nightly  QUEtiapine (SEROQUEL) tablet 50 mg, Nightly  senna (SENOKOT) tablet 8.6 mg, BID  sertraline (ZOLOFT) tablet 100 mg, Daily  docusate sodium (COLACE) capsule 100 mg, Daily  sodium chloride flush 0.9 % injection 5-40 mL, 2 times per day  sodium chloride flush 0.9 % injection 5-40 mL, PRN  0.9 % sodium chloride infusion, PRN  acetaminophen (TYLENOL) tablet 650 mg, Q4H PRN  ondansetron (ZOFRAN-ODT) disintegrating tablet 4 mg, Q8H PRN   Or  ondansetron (ZOFRAN) injection 4 mg, Q6H PRN        All medication charted and reviewed.     ALLERGIES     is allergic to ceftriaxone. FAMILY HISTORY     She indicated that her mother is . She indicated that her father is . family history includes Alcohol Abuse in her mother; Cirrhosis in her mother. The patient denies any pertinent family history. I have reviewed and agree with the family history entered. I have reviewed the Family History and it is not significant to the case    SOCIAL HISTORY      reports that she has been smoking cigarettes. She has a 20.00 pack-year smoking history. She has never used smokeless tobacco. She reports previous alcohol use. She reports that she does not use drugs. I have reviewed and agree with all Social.  There are no concerns for substance abuse/use. PHYSICAL EXAM     INITIAL VITALS:  weight is 160 lb (72.6 kg). Her oral temperature is 97.9 °F (36.6 °C). Her blood pressure is 117/74 and her pulse is 99. Her respiration is 16 and oxygen saturation is 97%.       CONSTITUTIONAL: AOx4, no apparent distress, appears stated age    HEAD: normocephalic, atraumatic   EYES: PERRLA, EOMI    ENT: moist mucous membranes, uvula midline   NECK: supple, symmetric   BACK: symmetric   LUNGS: clear to auscultation bilaterally   CARDIOVASCULAR: regular rate and rhythm, no murmurs, rubs or gallops   ABDOMEN: soft, mild generalized tenderness, non-distended with normal active bowel sounds   NEUROLOGIC:  MAEx4, no focal sensory or motor deficits   MUSCULOSKELETAL: no clubbing, cyanosis or edema   SKIN: no rash or wounds       DIFFERENTIAL DIAGNOSIS/MDM:   Weakness:  DDX: CVA, MS, Guillain [place], Transverse myelitis, Myasthenia gravis, cardiac, anemia, electrolytes, infection, change in medications, hypothyroid, rheumatalgic, depression, dehydration    Abdominal Pain:  DDX: GERD, PUD, pancreatitis, cholecystitis, GB colic, cholangitis, Xqhi-Xhto-Omgajl, ACS/ MI, pneumonia, SBO, DKA, AAA, mesenteric ischemia, perforated viscous, acute gastroenteritis, NSAP, pyelonephritis, kidney stone, appendicitis, hernia, D-TICS, testicular torsion, ectopic, ovarian torsion, ovarian cyst, PID, Mittelschmerz, period/ fibroid, UTI, constipation, epididymitis/ orchitis  Ransons criteria: WBC>16, age >49, glucose>200, AST>80, LDH>350  Evaluate for: EtOH abuse, ACS risk factors, point tenderness, rebound, guardingm Vickers sign, GB US (stone, sono Vickers, wall thick>3mm, CBD>6mm)    Herring Score: (appendicitis)  1. Abdominal pain (RLQ)     2  2. Anorexia (loss of appetite) or ketones in the urine  1  3. Nausea or vomiting      1  4. Migration to R iliac fossa     1  5. Rebound tenderness     1  6. Fever of 37.3 °C/ 99.1 F +     1  7. Leukocytosis > 30305 WBC    2  8. Neutrophilia, or an increase in % of neutrophils in WBC 1  Total:  /10    (<3 no CT, 4-6 CT, 7-8 Surgery consult, 5-6 is consistent with diagnosis of acute appendicitis, 7-8 indicates a probable appendicitis, 9-10 indicates a very probable acute appendicitis)    BISAP Score: (pancreatitis)  BUN >25           1  Impaired mental status        1  SIRS criteria (HR >90, T 100.4, 36, RR >20/ CO2 <32, WBC >12, <4)  1  Age >60          1   Pleural Effusion          1  Total:     (Patients with a BISAP Score >0 had an increasing risk of mortality, with mortality increasing significantly with a score of 3 or greater. A score of 5 had a mortality rate of 22%.)      DIAGNOSTIC RESULTS     RADIOLOGY:   I directly visualized the following  images and reviewed the radiologist interpretations:    CT ABDOMEN PELVIS W IV CONTRAST Additional Contrast? None    Result Date: 6/3/2022  EXAMINATION: CT OF THE ABDOMEN AND PELVIS WITH CONTRAST 6/3/2022 12:02 pm TECHNIQUE: CT of the abdomen and pelvis was performed with the administration of intravenous contrast. Multiplanar reformatted images are provided for review.  Automated exposure control, iterative reconstruction, and/or weight based adjustment of the mA/kV was utilized to reduce the radiation dose to as low as reasonably achievable. COMPARISON: CT from February 22 22 HISTORY: ORDERING SYSTEM PROVIDED HISTORY: Ovarian cancer, severe constipation, abdominal pain TECHNOLOGIST PROVIDED HISTORY: Ovarian cancer, severe constipation, abdominal pain Decision Support Exception - unselect if not a suspected or confirmed emergency medical condition->Emergency Medical Condition (MA) Reason for Exam: ovarian cancer, severe constipation, abd pain FINDINGS: Lower Chest: Redemonstration of multiple pulmonary nodules in the lung bases most significant in the left lower lobe but diffuse throughout the lower lobes. The largest pulmonary nodule measures 3.0 by 1.6 cm, not significantly changed from prior study. Possible partially seen mediastinal lymphadenopathy. Correlate with dedicated chest imaging if indicated. Organs: Liver: Normal appearance of the liver. Gallbladder: The gallbladder is distended with a large gallstone near the gallbladder neck. No significant biliary ductal dilatation. Spleen: Chronic appearing calcifications near the spleen. Pancreas: No peripancreatic inflammatory changes. Adrenal Glands: Streak artifact limits evaluation of the left adrenal gland. No convincing focal abnormality of the adrenal glands with this limitation. Kidneys: Chronic dilatation of the right renal pelvis and proximal ureter, unchanged and favoring pelviectasis without hydronephrosis. No obstructing stone identified. No left-sided hydronephrosis. GI/Bowel: Stomach: The stomach is displaced by diffusely distended colon. Small bowel: No evidence of small bowel obstruction. Colon: Diffuse stool-filled dilated colon most significant at the transverse colon. Some wall thickening and inflammatory changes most notable at the rectosigmoid colon where there is a segment of narrowing measuring approximately 5.3 cm on page 150 of series 2. This is similar or improved from prior study. Hollis Jorge Appendix: Normal appearance of the appendix.  Pelvis: Trace free fluid in the pelvis. Distended urinary bladder. Catheter projects terminating in the mid lower abdomen. Peritoneum/Retroperitoneum: Trace free fluid in the abdomen. No pneumoperitoneum. Traversing catheter as above. Normal caliber aorta with atherosclerosis. Few mildly prominent and some calcified retroperitoneal lymph nodes, unchanged. Masslike soft tissue density within the pelvic peritoneum with some calcification is unchanged when considering differences in measurement technique. Largest left inguinal lymph node measures 17 x 22 mm, similar or slightly improved from prior study when it measured up to 20 x 27 mm Bones/Soft Tissues: Diffuse subcutaneous soft tissue edema increased compared to the prior study and most significant in the left proximal thigh. Diffuse osteoblastic metastatic disease redemonstrated in the bony pelvis and visualized spine     Worsened diffuse colonic distension with stool and air-filled colon and wall thickening. Suggestion of a segment of narrowing of the rectosigmoid colon redemonstrated, similar or slightly improved from prior study. Chronic colonic obstruction/stricture of versus Toston syndrome are considerations. Correlate with direct visualization if indicated. Unchanged masslike partially calcified lesion in the pelvis may be compatible with known history of malignancy or post treatment changes in this location. mild improved left inguinal lymphadenopathy, otherwise stable retroperitoneal lymphadenopathy with some partially calcified lymph nodes. Worsened subcutaneous soft tissue edema most significant at the left lower proximal thigh could be related to lymphedema. Stable osseous metastatic disease.      XR CHEST PORTABLE    Result Date: 6/3/2022  EXAMINATION: ONE XRAY VIEW OF THE CHEST 6/3/2022 9:30 am COMPARISON: May 3, 2022 chest exam HISTORY: ORDERING SYSTEM PROVIDED HISTORY: Fatigue, ovarian cancer with pulmonary mets TECHNOLOGIST PROVIDED HISTORY: Fatigue, ovarian cancer with pulmonary mets Reason for Exam: uprt port FINDINGS: Stable right IJ port catheter with tip projected at the SVC Stable cardiac silhouette and moderate to marked asymmetric elevation right hemidiaphragm There is mild prominence of interstitial markings. There is stable minimal streaky right basilar density. No significant pleural process     Stable support line Mild prominence of interstitial markings suggests mild vascular congestion       LABS:  I have reviewed and interpreted all available lab results.   Labs Reviewed   CBC WITH AUTO DIFFERENTIAL - Abnormal; Notable for the following components:       Result Value    WBC 13.9 (*)     RBC 2.42 (*)     Hemoglobin 6.3 (*)     Hematocrit 23.9 (*)     MCHC 26.4 (*)     RDW 19.7 (*)     Platelets 680 (*)     NRBC Automated 0.3 (*)     Immature Granulocytes 1 (*)     Seg Neutrophils 79 (*)     Lymphocytes 8 (*)     Eosinophils % 0 (*)     Segs Absolute 10.98 (*)     Absolute Mono # 1.53 (*)     All other components within normal limits   COMPREHENSIVE METABOLIC PANEL W/ REFLEX TO MG FOR LOW K - Abnormal; Notable for the following components:    Glucose 131 (*)     Calcium 7.6 (*)     Sodium 134 (*)     Potassium 3.6 (*)     Anion Gap 8 (*)     Alkaline Phosphatase 112 (*)     Total Bilirubin <0.10 (*)     Total Protein 4.5 (*)     Albumin 2.3 (*)     All other components within normal limits   URINALYSIS WITH REFLEX TO CULTURE - Abnormal; Notable for the following components:    Ketones, Urine TRACE (*)     Leukocyte Esterase, Urine TRACE (*)     All other components within normal limits   MICROSCOPIC URINALYSIS - Abnormal; Notable for the following components:    Crystals, UA MODERATE CALCIUM OXALATE (*)     All other components within normal limits   HEMOGLOBIN AND HEMATOCRIT - Abnormal; Notable for the following components:    Hemoglobin 8.1 (*)     Hematocrit 28.7 (*)     All other components within normal limits    - Abnormal; Notable for all attempts are made to edit the dictation for accuracy, there may be errors in the transcription that are not intended.

## 2022-06-04 NOTE — PLAN OF CARE
Pt was given tap water enema per order, she tolerated 500 mls of fluid administered with no complications. Some flecks of ricky were noted, she remains on her left side , will call out once she has to go.

## 2022-06-04 NOTE — PLAN OF CARE
Problem: Chronic Conditions and Co-morbidities  Goal: Patient's chronic conditions and co-morbidity symptoms are monitored and maintained or improved  6/4/2022 0015 by Shantel Masters RN  Outcome: Progressing  6/3/2022 1835 by Bolivar Vázquez RN  Outcome: Progressing  Flowsheets (Taken 6/3/2022 1530)  Care Plan - Patient's Chronic Conditions and Co-Morbidity Symptoms are Monitored and Maintained or Improved: Monitor and assess patient's chronic conditions and comorbid symptoms for stability, deterioration, or improvement     Problem: Pain  Goal: Verbalizes/displays adequate comfort level or baseline comfort level  6/4/2022 0015 by Shantel Masters RN  Outcome: Progressing  6/3/2022 1835 by Bolivar Vázquez RN  Outcome: Progressing     Problem: Skin/Tissue Integrity  Goal: Absence of new skin breakdown  Description: 1. Monitor for areas of redness and/or skin breakdown  2. Assess vascular access sites hourly  3. Every 4-6 hours minimum:  Change oxygen saturation probe site  4. Every 4-6 hours:  If on nasal continuous positive airway pressure, respiratory therapy assess nares and determine need for appliance change or resting period.   Outcome: Progressing     Problem: Safety - Adult  Goal: Free from fall injury  Outcome: Progressing     Problem: ABCDS Injury Assessment  Goal: Absence of physical injury  Outcome: Progressing

## 2022-06-04 NOTE — CONSULTS
THE [facility] AT [facility]  Consultation Note     . REASON FOR CONSULTATION:    Chronic obstruction vs Lisha's syndrome  HX of ovarian cancer    HISTORY OF PRESENT ILLNESS:     This is a 59 y.o. female with PMH including stage IV ovarian cancer-currently on chemotherapy, sigmoid stricture, narcotic induced constipation who presented with severe fatigue and weakness. Patient has had multiple abdominal surgeries including hysterectomy, cyst drainage, anal fistulectomy, transverse colectomy, hysterectomy. Patient states that yesterday she was too tired and weak to go to her chemotherapy appointment so she came to the ER instead. GI was consulted due to concern chronic obstruction versus Lisha syndrome. Pt states she can't remember last BM but staff report it was 4 days ago. Pt is complaining of left lower quadrant abdominal pain described as constant and cramping in nature. She denies any rectal bleeding. Change in appetite or weight. Patient does not take any stool softeners or laxatives on a regular basis at home  Current to home med list patient is on MS Contin 45 mg twice daily and Percocet 5/325 up to 6 times daily chronic pain  Of note 8/2020 patient had colonoscopy patient of colonic stricture that was unable to be transversed per Dr. Wing Posadas      Summary of imaging completed at this time:  6/3/2022 CT abdomen and pelvis showed worsened diffuse colonic distention with stool and air filled colon and wall thickening-suggestive of segmental narrowing of the rectosigmoid colon-similar to prior study. Chronic stricture versus Lisha syndrome or consideration      Summary of abnormal labs completed at this time:    Metabolic: , potassium 3.6  Hematology: WBCs 13, hemoglobin 7.3, MCV 94.3, platelet count 886, sat 6%  Coags: 1.2  Liver profile: WNL except alk phos 112      On physician exam: Patient complains of left lower quadrant pain, is tearful regarding prognosis.   Patient shared that             - Perform a colonoscopy today.                                 - Await pathology results.      Dr. Vlad Yan,   8/6/2020 7:50:53 PM   Past Medical/Social/Family History:  Past Medical History:   Diagnosis Date    Anemia     Bleeding 10/2020    intra-abdominal bleeding -due to splenic mass with GI infiltration. Status post embolization    [facility]     Depression     Diabetes mellitus ([facility]     GERD (gastroesophageal reflux disease)     Hx of blood clots     Hypertension     Metastatic cancer ([facility] [address] [facility] 10/2020    extensive intraabdominal and splenic involvement and lung mets.  Ovarian cancer ([facility] [address] [facility]     low grade serous ovarian carcinoma    Post chemo evaluation     2007: Chemo via med onc (Dr. Christin Hyde), 2008: Rhae Nay due to rising CA-125, 2013: intraperitoneal chemo,12/2015: Ca125 - 25     Splenic lesion      Past Surgical History:   Procedure Laterality Date    ABSCESS DRAINAGE  2013    Franca rectal    ANUS SURGERY      ANAL FISSURECTOMY    CARDIAC CATHETERIZATION      COLECTOMY  03/2013    ex lap, tumor debulking, transverse colectomy w reanastamosis, subgastric omentectomy, intraperitoneal port placement    HYSTERECTOMY, TOTAL ABDOMINAL      IR EMBOLIZATION HEMORRHAGE  10/05/2020    intra-abdominal bleeding -due to splenic mass with GI infiltration. Status post embolization [employer] interlock coils x7. mri condtional 3t ok, safe immediately post implant.  IR PORT PLACEMENT EQUAL OR GREATER THAN 5 YEARS  08/24/2020    IR PORT PLACEMENT EQUAL OR GREATER THAN 5 YEARS 8/24/2020 Zoya Lemos MD [facility]    PORT SURGERY      IP Port    TONSILLECTOMY       Family History   Problem Relation Age of Onset    Alcohol Abuse Mother     Cirrhosis Mother      Previous records/history/ and notes were reviewed    Allergies:   Allergies   Allergen Reactions    Ceftriaxone      Had a rash on ceftriaxone December 2020, [address] medications:  Prior to Admission medications    Medication Sig Start Date End Date Taking? Authorizing Provider   morphine (MS CONTIN) 15 MG extended release tablet TAKE 1 TABLET BY MOUTH 2 TIMES DAILY FOR 30 DAYS. 5/19/22 6/18/22  Alvaro Bhatia MD   morphine (MS CONTIN) 30 MG extended release tablet TAKE 1 TABLET BY MOUTH 2 TIMES DAILY FOR 30 DAYS. 5/19/22 6/18/22  Eun Sánchez MD   oxyCODONE-acetaminophen (PERCOCET) 5-325 MG per tablet Take 1 tablet by mouth every 4 hours as needed for Pain for up to 30 days. 5/13/22 6/12/22  Alvaro Bhatia MD   LORazepam (ATIVAN) 1 MG tablet Take 1 tablet by mouth every 8 hours as needed for Anxiety for up to 30 doses.  5/13/22 6/24/22  Eun Sánchez MD   ondansetron (ZOFRAN-ODT) 4 MG disintegrating tablet Take 1 tablet by mouth every 8 hours as needed for Nausea or Vomiting 5/13/22   Eun Sánchez MD   QUEtiapine (SEROQUEL) 25 MG tablet 1 TABLET ALONG WITH 50MG AT BEDTIME 5/9/22   Alvaro Bhatia MD   pantoprazole (PROTONIX) 40 MG tablet TAKE 1 TABLET BY MOUTH DAILY 5/9/22   Eun Sánchez MD   senna (SENOKOT) 8.6 MG tablet Take 1 tablet by mouth 2 times daily 5/2/22 5/2/23  [facility], MD   QUEtiapine (SEROQUEL) 50 MG tablet TAKE 1 TABLET BY MOUTH ONCE DAILY AT BEDTIME ALONG WITH 25MG 4/13/22   Eun Sánchez MD   melatonin 5 MG TABS tablet Take 1 tablet by mouth daily 4/13/22   Shanda Medhkour,    benzonatate (TESSALON PERLES) 100 MG capsule Take 1 capsule by mouth 3 times daily as needed for Cough 4/13/22   Shanda Medhkour, DO   apixaban (ELIQUIS) 5 MG TABS tablet Take 1 tablet by mouth 2 times daily 4/8/22   Eun Sánchez MD   sertraline (ZOLOFT) 100 MG tablet TAKE 1 TABLET BY MOUTH DAILY 3/21/22 4/20/22  Eun Sánchez MD   levETIRAcetam (KEPPRA) 1000 MG tablet Take 1 tablet by mouth 2 times daily 2/28/22   Alida Lagos MD   melatonin 5 MG TABS tablet Take 1 tablet by mouth nightly as needed (sleep) 2/28/22 4/13/22 Stew Werner MD   hydrocortisone (ANUSOL-HC) 2.5 % CREA rectal cream Apply on affected area twice daily 2/28/22   Stew Werner MD   lamoTRIgine (LAMICTAL) 25 MG tablet Take 5 tablets by mouth 2 times daily 2/28/22   Stew Werner MD   ferrous sulfate (IRON 325) 325 (65 Fe) MG tablet Take 1 tablet by mouth daily (with breakfast) 10/31/21   Ruth Soliman MD     .  Current Medications:  Scheduled Meds:   apixaban  5 mg Oral BID    ferrous sulfate  325 mg Oral Daily with breakfast    hydrocortisone   Rectal BID    lamoTRIgine  125 mg Oral BID    levETIRAcetam  1,000 mg Oral BID    morphine  15 mg Oral BID    morphine  30 mg Oral BID    pantoprazole  40 mg Oral Daily    QUEtiapine  25 mg Oral Nightly    QUEtiapine  50 mg Oral Nightly    senna  1 tablet Oral BID    sertraline  100 mg Oral Daily    docusate sodium  100 mg Oral Daily    sodium chloride flush  5-40 mL IntraVENous 2 times per day     . Continuous Infusions:   sodium chloride      sodium chloride       . PRN Meds:sodium chloride, benzonatate, LORazepam, melatonin, ondansetron, oxyCODONE-acetaminophen, sodium chloride flush, sodium chloride, acetaminophen, ondansetron **OR** ondansetron    REVIEW OF SYSTEMS:     Constitutional: No fever, no chills, no lethargy, no weakness, no weight loss  HEENT:  No headache, otalgia, itchy eyes, nasal discharge or sore throat. Cardiac:  No chest pain, dyspnea, orthopnea or PND. Chest:   No cough, phlegm or wheezing. Abdomen:  LLQ abdominal pain, cramping, constipation  Neuro:  No focal weakness, abnormal movements or seizure like activity. Skin:   No rashes, no itching. :   No hematuria, no pyuria, no dysuria, no flank pain. Extremities:  No swelling or joint pains. ROS was otherwise negative except as mentioned in the [address] PHYSICAL EXAM:    BP (!) 107/53   Pulse 61   Temp 97.3 °F (36.3 °C)   Resp 16   Wt 160 lb (72.6 kg)   SpO2 98%   BMI 26.63 kg/m²   . TMAX[24]    General: emotionally upset, tearful  Head:  Normocephalic, Atraumatic  EENT: EOMI, Sclera not icteric, Oropharynx moist  Neck:  Supple, Trachea midline  Lungs:CTA Bilaterally  Heart: RRR, No murmur, No rub, No gallop, PMI nondisplaced. Abdomen:Soft, extremely tender, Not distended, BS WNL,  No masses. No hepatomegalia   Ext:No clubbing. No cyanosis. No edema. Skin: No rashes. No jaundice. No stigmata of liver disease. Neuro:  A&O x Three, No focal neurological deficits    Imagin/3/2022 CT Abdomen and Pelvis  FINDINGS:   Lower Chest: Redemonstration of multiple pulmonary nodules in the lung bases   most significant in the left lower lobe but diffuse throughout the lower   lobes.  The largest pulmonary nodule measures 3.0 by 1.6 cm, not   significantly changed from prior study.  Possible partially seen mediastinal   lymphadenopathy.  Correlate with dedicated chest imaging if indicated.       Organs:       Liver: Normal appearance of the liver.       Gallbladder: The gallbladder is distended with a large gallstone near the   gallbladder neck.  No significant biliary ductal dilatation.       Spleen: Chronic appearing calcifications near the spleen.       Pancreas: No peripancreatic inflammatory changes.       Adrenal Glands: Streak artifact limits evaluation of the left adrenal gland. No convincing focal abnormality of the adrenal glands with this limitation.       Kidneys: Chronic dilatation of the right renal pelvis and proximal ureter,   unchanged and favoring pelviectasis without hydronephrosis.  No obstructing   stone identified.  No left-sided hydronephrosis.       GI/Bowel:       Stomach:  The stomach is displaced by diffusely distended colon.       Small bowel: No evidence of small bowel obstruction.       Colon: Diffuse stool-filled dilated colon most significant at the transverse   colon.  Some wall thickening and inflammatory changes most notable at the   rectosigmoid colon where there is a segment of narrowing measuring   approximately 5.3 cm on page 150 of series 2.  This is similar or improved   from prior study. .       Appendix: Normal appearance of the appendix.       Pelvis: Trace free fluid in the pelvis.  Distended urinary bladder.  Catheter   projects terminating in the mid lower abdomen.       Peritoneum/Retroperitoneum: Trace free fluid in the abdomen.  No   pneumoperitoneum.  Traversing catheter as above.  Normal caliber aorta with   atherosclerosis.  Few mildly prominent and some calcified retroperitoneal   lymph nodes, unchanged.  Masslike soft tissue density within the pelvic   peritoneum with some calcification is unchanged when considering differences   in measurement technique.  Largest left inguinal lymph node measures 17 x 22   mm, similar or slightly improved from prior study when it measured up to 20 x   27 mm       Bones/Soft Tissues: Diffuse subcutaneous soft tissue edema increased compared   to the prior study and most significant in the left proximal thigh.  Diffuse   osteoblastic metastatic disease redemonstrated in the bony pelvis and   visualized spine           Impression   Worsened diffuse colonic distension with stool and air-filled colon and wall   thickening.  Suggestion of a segment of narrowing of the rectosigmoid colon   redemonstrated, similar or slightly improved from prior study.  Chronic   colonic obstruction/stricture of versus Lisha syndrome are considerations.    Correlate with direct visualization if indicated.       Unchanged masslike partially calcified lesion in the pelvis may be compatible   with known history of malignancy or post treatment changes in this location.       mild improved left inguinal lymphadenopathy, otherwise stable retroperitoneal   lymphadenopathy with some partially calcified lymph nodes.       Worsened subcutaneous soft tissue edema most significant at the left lower   proximal thigh could be related to lymphedema.       Stable osseous metastatic disease. Hemotological labs: Anemia studies:  Recent Labs     06/04/22  0419   LABIRON 6*   TIBC 131*   FERRITIN 33   [id number]   FOLATE 8.7       CBC:  Recent Labs     06/03/22  0921 06/03/22  1249 06/04/22  0419   WBC 13.9*  --  13.0*   HGB 6.3* 8.1* 7.3*   MCV 98.8  --  94.3   RDW 19.7*  --  19.8*   *  --  577*       PT/INR:  No results for input(s): PROTIME, INR in the last 72 hours. BMP:  Recent Labs     06/03/22 0921   *   K 3.6*      CO2 24   BUN 15   CREATININE 0.52   GLUCOSE 131*   CALCIUM 7.6*       Liver work up:  Hepatitis Functional Panel:  Recent Labs     06/03/22 0921   ALKPHOS 112*   ALT 7   AST 11   PROT 4.5*   BILITOT <0.10*   LABALBU 2.3*       Amylase/Lipase/Ammonia:  No results for input(s): AMYLASE, LIPASE, AMMONIA in the last 72 hours. Acute Hepatitis Panel:  No results found for: HEPBSAG, HEPCAB, HEPBIGM, HEPAIGM         Principal Problem:    Anemia  Resolved Problems:    * No resolved hospital problems. *       GI Impression and recommendations and plan:    59 yr old female with chronic sigmoid stricture, constipation, left lower abdominal pain in the setting of stage IV metastatic ovarian cancer. Pt has had multiple abdominal surgeries that have most likely caused adhesions contributing to constipation. Pt on chronic narcotics for pain/symptom control and non-complaint with bowel regimen at home. 1. Rec good bowel regimen to facilitate bowel movements and prevent constipation with daily stool softener and Miralax BID. This can be titrated to need  2. Dulcolax suppositories PRN. Can have tap water enema if no response  3. Low residual diet  4. Plenty of oral hydration  5.  Ambulation, OOB    This plan was formulated in collaboration with Dr. Jeannette Freeman MD      Electronically signed by:  ESTRELLITA Ramsay [id number]  [phone]  6/4/2022    9:21 AM     This note was created with the assistance of a speech-recognition program.  Although the intention is to generate a document that actually reflects the content of the visit, no guarantees can be provided that every mistake has been identified and corrected by editing.

## 2022-06-04 NOTE — PROGRESS NOTES
phlebotomist notified that labs were ordered to be drawn 06/03/22 at 80. Phlebotomist stated \" I am not seeing those orders and they say 6 am on my orders\". Writer reads back verbally what orders are on writers screen to the phlebotomist. Again phlebotomist stated\" that's not what I see and we will be coming up to the floor between 4 and 5 to draw them. Writer asked phlebotomist to come to the floor and draw this patients labs as soon as possible for they where post transfusion orders to be drawn. will continue to monitor.

## 2022-06-05 LAB
ABSOLUTE EOS #: 0.12 K/UL (ref 0–0.4)
ABSOLUTE IMMATURE GRANULOCYTE: 0 K/UL (ref 0–0.3)
ABSOLUTE LYMPH #: 0.85 K/UL (ref 1–4.8)
ABSOLUTE MONO #: 1.33 K/UL (ref 0.1–0.8)
ANION GAP SERPL CALCULATED.3IONS-SCNC: 10 MMOL/L (ref 9–17)
BASOPHILS # BLD: 1 % (ref 0–2)
BASOPHILS ABSOLUTE: 0.12 K/UL (ref 0–0.2)
BUN BLDV-MCNC: 12 MG/DL (ref 6–20)
CALCIUM SERPL-MCNC: 7.4 MG/DL (ref 8.6–10.4)
CHLORIDE BLD-SCNC: 103 MMOL/L (ref 98–107)
CO2: 22 MMOL/L (ref 20–31)
CREAT SERPL-MCNC: 0.4 MG/DL (ref 0.5–0.9)
EOSINOPHILS RELATIVE PERCENT: 1 % (ref 1–4)
GFR AFRICAN AMERICAN: >60 ML/MIN
GFR NON-AFRICAN AMERICAN: >60 ML/MIN
GFR SERPL CREATININE-BSD FRML MDRD: ABNORMAL ML/MIN/{1.73_M2}
GLUCOSE BLD-MCNC: 107 MG/DL (ref 70–99)
HCT VFR BLD CALC: 23.6 % (ref 36.3–47.1)
HCT VFR BLD CALC: 27.8 % (ref 36.3–47.1)
HEMOGLOBIN: 6.8 G/DL (ref 11.9–15.1)
HEMOGLOBIN: 8.6 G/DL (ref 11.9–15.1)
IMMATURE GRANULOCYTES: 0 %
LYMPHOCYTES # BLD: 7 % (ref 24–44)
MCH RBC QN AUTO: 27.4 PG (ref 25.2–33.5)
MCHC RBC AUTO-ENTMCNC: 28.8 G/DL (ref 28.4–34.8)
MCV RBC AUTO: 95.2 FL (ref 82.6–102.9)
MONOCYTES # BLD: 11 % (ref 1–7)
MORPHOLOGY: ABNORMAL
MORPHOLOGY: ABNORMAL
NRBC AUTOMATED: 0.7 PER 100 WBC
NUCLEATED RED BLOOD CELLS: 1 PER 100 WBC
PDW BLD-RTO: 19.7 % (ref 11.8–14.4)
PLATELET # BLD: 533 K/UL (ref 138–453)
PMV BLD AUTO: 9.2 FL (ref 8.1–13.5)
POTASSIUM SERPL-SCNC: 3.5 MMOL/L (ref 3.7–5.3)
RBC # BLD: 2.48 M/UL (ref 3.95–5.11)
SEG NEUTROPHILS: 80 % (ref 36–66)
SEGMENTED NEUTROPHILS ABSOLUTE COUNT: 9.68 K/UL (ref 1.8–7.7)
SODIUM BLD-SCNC: 135 MMOL/L (ref 135–144)
WBC # BLD: 12.1 K/UL (ref 3.5–11.3)

## 2022-06-05 PROCEDURE — 6370000000 HC RX 637 (ALT 250 FOR IP): Performed by: STUDENT IN AN ORGANIZED HEALTH CARE EDUCATION/TRAINING PROGRAM

## 2022-06-05 PROCEDURE — 86900 BLOOD TYPING SEROLOGIC ABO: CPT

## 2022-06-05 PROCEDURE — 85018 HEMOGLOBIN: CPT

## 2022-06-05 PROCEDURE — 6360000002 HC RX W HCPCS: Performed by: INTERNAL MEDICINE

## 2022-06-05 PROCEDURE — 36430 TRANSFUSION BLD/BLD COMPNT: CPT

## 2022-06-05 PROCEDURE — 1200000000 HC SEMI PRIVATE

## 2022-06-05 PROCEDURE — 99232 SBSQ HOSP IP/OBS MODERATE 35: CPT | Performed by: INTERNAL MEDICINE

## 2022-06-05 PROCEDURE — 94761 N-INVAS EAR/PLS OXIMETRY MLT: CPT

## 2022-06-05 PROCEDURE — 85025 COMPLETE CBC W/AUTO DIFF WBC: CPT

## 2022-06-05 PROCEDURE — 6370000000 HC RX 637 (ALT 250 FOR IP)

## 2022-06-05 PROCEDURE — 85014 HEMATOCRIT: CPT

## 2022-06-05 PROCEDURE — 36415 COLL VENOUS BLD VENIPUNCTURE: CPT

## 2022-06-05 PROCEDURE — 99222 1ST HOSP IP/OBS MODERATE 55: CPT | Performed by: INTERNAL MEDICINE

## 2022-06-05 PROCEDURE — 80048 BASIC METABOLIC PNL TOTAL CA: CPT

## 2022-06-05 PROCEDURE — 2580000003 HC RX 258: Performed by: INTERNAL MEDICINE

## 2022-06-05 PROCEDURE — 2700000000 HC OXYGEN THERAPY PER DAY

## 2022-06-05 PROCEDURE — P9016 RBC LEUKOCYTES REDUCED: HCPCS

## 2022-06-05 PROCEDURE — 2580000003 HC RX 258: Performed by: STUDENT IN AN ORGANIZED HEALTH CARE EDUCATION/TRAINING PROGRAM

## 2022-06-05 RX ORDER — SODIUM CHLORIDE 9 MG/ML
INJECTION, SOLUTION INTRAVENOUS PRN
Status: DISCONTINUED | OUTPATIENT
Start: 2022-06-05 | End: 2022-06-13 | Stop reason: HOSPADM

## 2022-06-05 RX ORDER — POTASSIUM CHLORIDE 20 MEQ/1
40 TABLET, EXTENDED RELEASE ORAL ONCE
Status: COMPLETED | OUTPATIENT
Start: 2022-06-05 | End: 2022-06-05

## 2022-06-05 RX ADMIN — PANTOPRAZOLE SODIUM 40 MG: 40 TABLET, DELAYED RELEASE ORAL at 08:53

## 2022-06-05 RX ADMIN — LEVETIRACETAM 1000 MG: 500 TABLET, FILM COATED ORAL at 08:54

## 2022-06-05 RX ADMIN — Medication 5 MG: at 21:08

## 2022-06-05 RX ADMIN — LORAZEPAM 1 MG: 0.5 TABLET ORAL at 11:07

## 2022-06-05 RX ADMIN — LAMOTRIGINE 125 MG: 100 TABLET ORAL at 21:06

## 2022-06-05 RX ADMIN — IRON SUCROSE 300 MG: 20 INJECTION, SOLUTION INTRAVENOUS at 15:53

## 2022-06-05 RX ADMIN — OXYCODONE HYDROCHLORIDE AND ACETAMINOPHEN 1 TABLET: 5; 325 TABLET ORAL at 06:52

## 2022-06-05 RX ADMIN — MORPHINE SULFATE 15 MG: 30 TABLET, FILM COATED, EXTENDED RELEASE ORAL at 08:53

## 2022-06-05 RX ADMIN — MORPHINE SULFATE 30 MG: 30 TABLET, FILM COATED, EXTENDED RELEASE ORAL at 21:05

## 2022-06-05 RX ADMIN — MORPHINE SULFATE 30 MG: 30 TABLET, FILM COATED, EXTENDED RELEASE ORAL at 08:53

## 2022-06-05 RX ADMIN — DOCUSATE SODIUM 100 MG: 100 CAPSULE ORAL at 08:54

## 2022-06-05 RX ADMIN — OXYCODONE HYDROCHLORIDE AND ACETAMINOPHEN 1 TABLET: 5; 325 TABLET ORAL at 15:11

## 2022-06-05 RX ADMIN — SERTRALINE 100 MG: 50 TABLET, FILM COATED ORAL at 08:53

## 2022-06-05 RX ADMIN — SODIUM CHLORIDE: 9 INJECTION, SOLUTION INTRAVENOUS at 15:51

## 2022-06-05 RX ADMIN — APIXABAN 5 MG: 5 TABLET, FILM COATED ORAL at 21:06

## 2022-06-05 RX ADMIN — SENNOSIDES 8.6 MG: 8.6 TABLET, COATED ORAL at 21:05

## 2022-06-05 RX ADMIN — QUETIAPINE FUMARATE 50 MG: 25 TABLET ORAL at 21:06

## 2022-06-05 RX ADMIN — POTASSIUM CHLORIDE 40 MEQ: 1500 TABLET, EXTENDED RELEASE ORAL at 13:16

## 2022-06-05 RX ADMIN — MORPHINE SULFATE 15 MG: 30 TABLET, FILM COATED, EXTENDED RELEASE ORAL at 21:04

## 2022-06-05 RX ADMIN — OXYCODONE HYDROCHLORIDE AND ACETAMINOPHEN 1 TABLET: 5; 325 TABLET ORAL at 11:07

## 2022-06-05 RX ADMIN — LAMOTRIGINE 125 MG: 100 TABLET ORAL at 08:54

## 2022-06-05 RX ADMIN — ONDANSETRON 4 MG: 4 TABLET, ORALLY DISINTEGRATING ORAL at 09:41

## 2022-06-05 RX ADMIN — APIXABAN 5 MG: 5 TABLET, FILM COATED ORAL at 08:54

## 2022-06-05 RX ADMIN — LEVETIRACETAM 1000 MG: 500 TABLET, FILM COATED ORAL at 21:06

## 2022-06-05 RX ADMIN — SODIUM CHLORIDE, PRESERVATIVE FREE 10 ML: 5 INJECTION INTRAVENOUS at 21:06

## 2022-06-05 RX ADMIN — OXYCODONE HYDROCHLORIDE AND ACETAMINOPHEN 1 TABLET: 5; 325 TABLET ORAL at 19:20

## 2022-06-05 ASSESSMENT — PAIN SCALES - GENERAL
PAINLEVEL_OUTOF10: 9
PAINLEVEL_OUTOF10: 0
PAINLEVEL_OUTOF10: 0
PAINLEVEL_OUTOF10: 8
PAINLEVEL_OUTOF10: 0
PAINLEVEL_OUTOF10: 9
PAINLEVEL_OUTOF10: 0
PAINLEVEL_OUTOF10: 0
PAINLEVEL_OUTOF10: 8
PAINLEVEL_OUTOF10: 8
PAINLEVEL_OUTOF10: 0
PAINLEVEL_OUTOF10: 9
PAINLEVEL_OUTOF10: 0
PAINLEVEL_OUTOF10: 3

## 2022-06-05 ASSESSMENT — PAIN - FUNCTIONAL ASSESSMENT: PAIN_FUNCTIONAL_ASSESSMENT: ACTIVITIES ARE NOT PREVENTED

## 2022-06-05 ASSESSMENT — PAIN DESCRIPTION - FREQUENCY
FREQUENCY: CONTINUOUS
FREQUENCY: CONTINUOUS

## 2022-06-05 ASSESSMENT — PAIN SCALES - WONG BAKER

## 2022-06-05 ASSESSMENT — PAIN DESCRIPTION - DESCRIPTORS
DESCRIPTORS: ACHING;DISCOMFORT
DESCRIPTORS: ACHING
DESCRIPTORS: ACHING;DISCOMFORT
DESCRIPTORS: ACHING
DESCRIPTORS: ACHING;DISCOMFORT

## 2022-06-05 ASSESSMENT — PAIN DESCRIPTION - ORIENTATION
ORIENTATION: MID

## 2022-06-05 ASSESSMENT — PAIN DESCRIPTION - ONSET: ONSET: ON-GOING

## 2022-06-05 ASSESSMENT — PAIN DESCRIPTION - LOCATION
LOCATION: ABDOMEN;BACK
LOCATION: BACK;ABDOMEN

## 2022-06-05 ASSESSMENT — PAIN DESCRIPTION - PAIN TYPE: TYPE: CHRONIC PAIN

## 2022-06-05 NOTE — PROGRESS NOTES
CDU Transfer Summary        Patient:  Melecio Norman [facility]  YOB: 1965    MRN: [id number]   Acct: [de-identified]    Primary Care Physician: Esperanza Taylor DO    Admit date:  6/3/2022  8:21 AM  Transfer date: No discharge date for patient encounter. 6/5/22    Transfer Diagnoses:     1.)  Anemia secondary to chemotherapy  2.)  Recurrent metastatic ovarian cancer  3.)  Severe weakness, fatigue due to above           Medication List      ASK your doctor about these medications    apixaban 5 MG Tabs tablet  Commonly known as: Eliquis  Take 1 tablet by mouth 2 times daily     benzonatate 100 MG capsule  Commonly known as: Tessalon Perles  Take 1 capsule by mouth 3 times daily as needed for Cough     ferrous sulfate 325 (65 Fe) MG tablet  Commonly known as: IRON 325  Take 1 tablet by mouth daily (with breakfast)     hydrocortisone 2.5 % Crea rectal cream  Commonly known as: ANUSOL-HC  Apply on affected area twice daily     lamoTRIgine 25 MG tablet  Commonly known as: LAMICTAL  Take 5 tablets by mouth 2 times daily     levETIRAcetam 1000 MG tablet  Commonly known as: KEPPRA  Take 1 tablet by mouth 2 times daily     LORazepam 1 MG tablet  Commonly known as: Ativan  Take 1 tablet by mouth every 8 hours as needed for Anxiety for up to 30 doses. * melatonin 5 mg Tabs tablet  Take 1 tablet by mouth nightly as needed (sleep)     * melatonin 5 mg Tabs tablet  Take 1 tablet by mouth daily     * morphine 15 MG extended release tablet  Commonly known as: MS CONTIN  TAKE 1 TABLET BY MOUTH 2 TIMES DAILY FOR 30 DAYS. * morphine 30 MG extended release tablet  Commonly known as: MS CONTIN  TAKE 1 TABLET BY MOUTH 2 TIMES DAILY FOR 30 DAYS.      ondansetron 4 MG disintegrating tablet  Commonly known as: ZOFRAN-ODT  Take 1 tablet by mouth every 8 hours as needed for Nausea or Vomiting     oxyCODONE-acetaminophen 5-325 MG per tablet  Commonly known as: PERCOCET  Take 1 tablet by mouth every 4 hours as needed for Pain for up to 30 days. pantoprazole 40 MG tablet  Commonly known as: PROTONIX  TAKE 1 TABLET BY MOUTH DAILY     * QUEtiapine 50 MG tablet  Commonly known as: SEROQUEL  TAKE 1 TABLET BY MOUTH ONCE DAILY AT BEDTIME ALONG WITH 25MG     * QUEtiapine 25 MG tablet  Commonly known as: SEROQUEL  1 TABLET ALONG WITH 50MG AT BEDTIME     senna 8.6 MG tablet  Commonly known as: Senokot  Take 1 tablet by mouth 2 times daily     sertraline 100 MG tablet  Commonly known as: ZOLOFT  TAKE 1 TABLET BY MOUTH DAILY         * This list has 6 medication(s) that are the same as other medications prescribed for you. Read the directions carefully, and ask your doctor or other care provider to review them with you. Diet:  ADULT DIET; Regular, advance as tolerated     Activity:  As tolerated    Consultants: IP CONSULT TO IV TEAM  IP CONSULT TO ONCOLOGY  IP CONSULT TO HOSPITALIST  IP CONSULT TO GI  IP CONSULT TO SURGICAL CRITICAL CARE TEAM    Procedures:  Not indicated     Diagnostic Test:   Results for orders placed or performed during the hospital encounter of 06/03/22   COVID-19, Rapid    Specimen: Nasopharyngeal Swab   Result Value Ref Range    Specimen Description . NASOPHARYNGEAL SWAB     SARS-CoV-2, Rapid Not Detected Not Detected   CBC with Auto Differential   Result Value Ref Range    WBC 13.9 (H) 3.5 - 11.3 k/uL    RBC 2.42 (L) 3.95 - 5.11 m/uL    Hemoglobin 6.3 (LL) 11.9 - 15.1 g/dL    Hematocrit 23.9 (L) 36.3 - 47.1 %    MCV 98.8 82.6 - 102.9 fL    MCH 26.0 25.2 - 33.5 pg    MCHC 26.4 (L) 28.4 - 34.8 g/dL    RDW 19.7 (H) 11.8 - 14.4 %    Platelets 181 (H) 955 - 453 k/uL    MPV 9.1 8.1 - 13.5 fL    NRBC Automated 0.3 (H) 0.0 per 100 WBC    Immature Granulocytes 1 (H) 0 %    Seg Neutrophils 79 (H) 36 - 65 %    Lymphocytes 8 (L) 24 - 43 %    Monocytes 11 3 - 12 %    Eosinophils % 0 (L) 1 - 4 %    Basophils 1 0 - 2 %    Absolute Immature Granulocyte 0.14 0.00 - 0.30 k/uL    Segs Absolute 10.98 (H) 1.50 - 8.10 k/uL    Absolute Lymph # 1.11 1.10 - 3.70 k/uL    Absolute Mono # 1.53 (H) 0.10 - 1.20 k/uL    Absolute Eos # 0.00 0.00 - 0.44 k/uL    Basophils Absolute 0.14 0.00 - 0.20 k/uL    Morphology ANISOCYTOSIS PRESENT     Morphology MICROCYTOSIS PRESENT     Morphology HYPOCHROMIA PRESENT     Morphology 1+ POLYCHROMASIA    Comprehensive Metabolic Panel w/ Reflex to MG   Result Value Ref Range    Glucose 131 (H) 70 - 99 mg/dL    BUN 15 6 - 20 mg/dL    CREATININE 0.52 0.50 - 0.90 mg/dL    Calcium 7.6 (L) 8.6 - 10.4 mg/dL    Sodium 134 (L) 135 - 144 mmol/L    Potassium 3.6 (L) 3.7 - 5.3 mmol/L    Chloride 102 98 - 107 mmol/L    CO2 24 20 - 31 mmol/L    Anion Gap 8 (L) 9 - 17 mmol/L    Alkaline Phosphatase 112 (H) 35 - 104 U/L    ALT 7 5 - 33 U/L    AST 11 <32 U/L    Total Bilirubin <0.10 (L) 0.3 - 1.2 mg/dL    Total Protein 4.5 (L) 6.4 - 8.3 g/dL    Albumin 2.3 (L) 3.5 - 5.2 g/dL    Albumin/Globulin Ratio 1.0 1.0 - 2.5    GFR Non-African American >60 >60 mL/min    GFR African American >60 >60 mL/min    GFR Comment         Troponin   Result Value Ref Range    Troponin, High Sensitivity 11 0 - 14 ng/L   Troponin   Result Value Ref Range    Troponin, High Sensitivity 8 0 - 14 ng/L   Urinalysis with Reflex to Culture    Specimen: Urine voided   Result Value Ref Range    Color, UA Yellow Yellow    Turbidity UA Clear Clear    Glucose, Ur NEGATIVE NEGATIVE    Bilirubin Urine NEGATIVE NEGATIVE    Ketones, Urine TRACE (A) NEGATIVE    Specific Gravity, UA 1.030 1.005 - 1.030    Urine Hgb NEGATIVE NEGATIVE    pH, UA 6.0 5.0 - 8.0    Protein, UA NEGATIVE NEGATIVE    Urobilinogen, Urine Normal Normal    Nitrite, Urine NEGATIVE NEGATIVE    Leukocyte Esterase, Urine TRACE (A) NEGATIVE   Microscopic Urinalysis   Result Value Ref Range    -          WBC, UA 2 TO 5 0 - 5 /HPF    RBC, UA 2 TO 5 0 - 4 /HPF    Casts UA  0 - 8 /LPF     0 TO 2 HYALINE Reference range defined for non-centrifuged specimen.     Crystals, UA MODERATE CALCIUM OXALATE (A) None /HPF Epithelial Cells UA 2 TO 5 0 - 5 /HPF   Hemoglobin and Hematocrit   Result Value Ref Range    Hemoglobin 8.1 (L) 11.9 - 15.1 g/dL    Hematocrit 28.7 (L) 36.3 - 47.1 %      Result Value Ref Range     338 (H) <38 U/mL   Iron and TIBC   Result Value Ref Range    Iron 8 (L) 37 - 145 ug/dL    TIBC 131 (L) 250 - 450 ug/dL    Iron Saturation 6 (L) 20 - 55 %    UIBC 123 112 - 347 ug/dL   Ferritin   Result Value Ref Range    Ferritin 33 13 - 150 ng/mL   Reticulocytes   Result Value Ref Range    Retic % 5.2 (H) 0.5 - 1.9 %    Absolute Retic # 0.140 (H) 0.030 - 0.080 M/uL    Immature Retic Fract 38.100 (H) 2.7 - 18.3 %    Retic Hemoglobin 22.3 (L) 28.2 - 35.7 pg   Vitamin B12 & Folate   Result Value Ref Range    Vitamin B-12 654 232 - 1245 pg/mL    Folate 8.7 >4.8 ng/mL   CBC with Auto Differential   Result Value Ref Range    WBC 13.0 (H) 3.5 - 11.3 k/uL    RBC 2.61 (L) 3.95 - 5.11 m/uL    Hemoglobin 7.3 (L) 11.9 - 15.1 g/dL    Hematocrit 24.6 (L) 36.3 - 47.1 %    MCV 94.3 82.6 - 102.9 fL    MCH 28.0 25.2 - 33.5 pg    MCHC 29.7 28.4 - 34.8 g/dL    RDW 19.8 (H) 11.8 - 14.4 %    Platelets 128 (H) 402 - 453 k/uL    MPV 9.3 8.1 - 13.5 fL    NRBC Automated 0.6 (H) 0.0 per 100 WBC    Immature Granulocytes 0 0 %    Seg Neutrophils 72 (H) 36 - 66 %    Lymphocytes 16 (L) 24 - 44 %    Monocytes 11 (H) 1 - 7 %    Eosinophils % 1 1 - 4 %    Basophils 0 0 - 2 %    Absolute Immature Granulocyte 0.00 0.00 - 0.30 k/uL    Segs Absolute 9.36 (H) 1.8 - 7.7 k/uL    Absolute Lymph # 2.08 1.0 - 4.8 k/uL    Absolute Mono # 1.43 (H) 0.1 - 0.8 k/uL    Absolute Eos # 0.13 0.0 - 0.4 k/uL    Basophils Absolute 0.00 0.0 - 0.2 k/uL    Morphology ANISOCYTOSIS PRESENT     Morphology 1+ POLYCHROMASIA    CBC with Auto Differential   Result Value Ref Range    WBC 12.1 (H) 3.5 - 11.3 k/uL    RBC 2.48 (L) 3.95 - 5.11 m/uL    Hemoglobin 6.8 (LL) 11.9 - 15.1 g/dL    Hematocrit 23.6 (L) 36.3 - 47.1 %    MCV 95.2 82.6 - 102.9 fL    MCH 27.4 25.2 - 33.5 pg MCHC 28.8 28.4 - 34.8 g/dL    RDW 19.7 (H) 11.8 - 14.4 %    Platelets 098 (H) 456 - 453 k/uL    MPV 9.2 8.1 - 13.5 fL    NRBC Automated 0.7 (H) 0.0 per 100 WBC    Immature Granulocytes 0 0 %    Seg Neutrophils 80 (H) 36 - 66 %    Lymphocytes 7 (L) 24 - 44 %    Monocytes 11 (H) 1 - 7 %    Eosinophils % 1 1 - 4 %    Basophils 1 0 - 2 %    nRBC 1 (H) 0 per 100 WBC    Absolute Immature Granulocyte 0.00 0.00 - 0.30 k/uL    Segs Absolute 9.68 (H) 1.8 - 7.7 k/uL    Absolute Lymph # 0.85 (L) 1.0 - 4.8 k/uL    Absolute Mono # 1.33 (H) 0.1 - 0.8 k/uL    Absolute Eos # 0.12 0.0 - 0.4 k/uL    Basophils Absolute 0.12 0.0 - 0.2 k/uL    Morphology ANISOCYTOSIS PRESENT     Morphology 1+ POLYCHROMASIA    EKG 12 Lead   Result Value Ref Range    Ventricular Rate 119 BPM    Atrial Rate 119 BPM    P-R Interval 160 ms    QRS Duration 80 ms    Q-T Interval 314 ms    QTc Calculation (Bazett) 441 ms    P Axis 72 degrees    R Axis 94 degrees    T Axis 112 degrees   TYPE AND SCREEN   Result Value Ref Range    Expiration Date 06/06/2022,2359     Arm Band Number [id number]     ABO/Rh A POSITIVE     Antibody Screen NEGATIVE     Unit Number [id number]     Product Code Leukocyte Reduced Red Cell     Unit Divison 00     Dispense Status ISSUED     Unit Issue Date/Time 211737253030     Product Code Blood Bank [id number]     Blood Bank Unit Type and Rh A POS     Blood Bank ISBT Product Blood Type 6200     Blood Bank Blood Product Expiration Date 749869607227     Transfusion Status OK TO TRANSFUSE     Crossmatch Result COMPATIBLE     Unit Number [id number]     Product Code Leukocyte Reduced Red Cell     Unit Divison 00     Dispense Status TRANSFUSED     Unit Issue Date/Time 107701071223     Product Code Blood Bank [id number]     Blood Bank Unit Type and Rh A POS     Blood Bank ISBT Product Blood Type 6200     Blood Bank Blood Product Expiration Date 744438903621     Transfusion Status OK TO TRANSFUSE     Crossmatch Result COMPATIBLE      CT ABDOMEN PELVIS W IV CONTRAST Additional Contrast? None    Result Date: 6/3/2022  EXAMINATION: CT OF THE ABDOMEN AND PELVIS WITH CONTRAST 6/3/2022 12:02 pm TECHNIQUE: CT of the abdomen and pelvis was performed with the administration of intravenous contrast. Multiplanar reformatted images are provided for review. Automated exposure control, iterative reconstruction, and/or weight based adjustment of the mA/kV was utilized to reduce the radiation dose to as low as reasonably achievable. COMPARISON: CT from February 22 22 HISTORY: ORDERING SYSTEM PROVIDED HISTORY: Ovarian cancer, severe constipation, abdominal pain TECHNOLOGIST PROVIDED HISTORY: Ovarian cancer, severe constipation, abdominal pain Decision Support Exception - unselect if not a suspected or confirmed emergency medical condition->Emergency Medical Condition (MA) Reason for Exam: ovarian cancer, severe constipation, abd pain FINDINGS: Lower Chest: Redemonstration of multiple pulmonary nodules in the lung bases most significant in the left lower lobe but diffuse throughout the lower lobes. The largest pulmonary nodule measures 3.0 by 1.6 cm, not significantly changed from prior study. Possible partially seen mediastinal lymphadenopathy. Correlate with dedicated chest imaging if indicated. Organs: Liver: Normal appearance of the liver. Gallbladder: The gallbladder is distended with a large gallstone near the gallbladder neck. No significant biliary ductal dilatation. Spleen: Chronic appearing calcifications near the spleen. Pancreas: No peripancreatic inflammatory changes. Adrenal Glands: Streak artifact limits evaluation of the left adrenal gland. No convincing focal abnormality of the adrenal glands with this limitation. Kidneys: Chronic dilatation of the right renal pelvis and proximal ureter, unchanged and favoring pelviectasis without hydronephrosis. No obstructing stone identified. No left-sided hydronephrosis. GI/Bowel: Stomach:  The stomach is displaced by diffusely distended colon. Small bowel: No evidence of small bowel obstruction. Colon: Diffuse stool-filled dilated colon most significant at the transverse colon. Some wall thickening and inflammatory changes most notable at the rectosigmoid colon where there is a segment of narrowing measuring approximately 5.3 cm on page 150 of series 2. This is similar or improved from prior study. e[place]mond [place] Appendix: Normal appearance of the appendix. Pelvis: Trace free fluid in the pelvis. Distended urinary bladder. Catheter projects terminating in the mid lower abdomen. Peritoneum/Retroperitoneum: Trace free fluid in the abdomen. No pneumoperitoneum. Traversing catheter as above. Normal caliber aorta with atherosclerosis. Few mildly prominent and some calcified retroperitoneal lymph nodes, unchanged. Masslike soft tissue density within the pelvic peritoneum with some calcification is unchanged when considering differences in measurement technique. Largest left inguinal lymph node measures 17 x 22 mm, similar or slightly improved from prior study when it measured up to 20 x 27 mm Bones/Soft Tissues: Diffuse subcutaneous soft tissue edema increased compared to the prior study and most significant in the left proximal thigh. Diffuse osteoblastic metastatic disease redemonstrated in the bony pelvis and visualized spine     Worsened diffuse colonic distension with stool and air-filled colon and wall thickening. Suggestion of a segment of narrowing of the rectosigmoid colon redemonstrated, similar or slightly improved from prior study. Chronic colonic obstruction/stricture of versus Lisha syndrome are considerations. Correlate with direct visualization if indicated. Unchanged masslike partially calcified lesion in the pelvis may be compatible with known history of malignancy or post treatment changes in this location.  mild improved left inguinal lymphadenopathy, otherwise stable retroperitoneal lymphadenopathy with some partially calcified lymph nodes. Worsened subcutaneous soft tissue edema most significant at the left lower proximal thigh could be related to lymphedema. Stable osseous metastatic disease. XR CHEST PORTABLE    Result Date: 6/3/2022  EXAMINATION: ONE XRAY VIEW OF THE CHEST 6/3/2022 9:30 am COMPARISON: May 3, 2022 chest exam HISTORY: ORDERING SYSTEM PROVIDED HISTORY: Fatigue, ovarian cancer with pulmonary mets TECHNOLOGIST PROVIDED HISTORY: Fatigue, ovarian cancer with pulmonary mets Reason for Exam: uprt port FINDINGS: Stable right IJ port catheter with tip projected at the SVC Stable cardiac silhouette and moderate to marked asymmetric elevation right hemidiaphragm There is mild prominence of interstitial markings. There is stable minimal streaky right basilar density. No significant pleural process     Stable support line Mild prominence of interstitial markings suggests mild vascular congestion           Physical Exam:    General appearance - NAD, AOx 3   Lungs -CTAB, no R/R/R  Heart - RRR, no M/R/G  Abdomen - Soft, NT/ND  Neurological:  MAEx4, No focal motor deficit, sensory loss  Extremities - Cap refil <2 sec in all ext., no edema  Skin -warm, dry      Hospital Course:  Clinical course has improved, labs and imaging reviewed. Janelle Spivey originally presented to the hospital on 6/3/2022  8:21 AM with anemia, weakness, fatigue. Was given 1 unit packed red blood cells in ED and placed in observation unit for reassessment and GI consult due to chronic ileus versus obstruction. GI consulted and noted findings are likely chronic and nothing further to do but recommended bowel movement prior to discharge. Patient was able to have bowel movement yesterday after enema. Had a repeat CBC this morning showing anemia less than 7 and transfused as per oncology recommendations.   At that time it was determined that She required further observation and continued management of fatigue, weakness as patient noted was not feeling safe to go home yet and may require additional transfusions based on hemoglobin. Anemia likely secondary to chemotherapy. Pt discussed directly with the admitting team    Disposition: Transfer  Condition: Good    Time Spent: 0 day      --  Keri Low MD  Emergency Medicine Attending Physician    This dictation was generated by voice recognition computer software. Although all attempts are made to edit the dictation for accuracy, there may be errors in the transcription that are not intended.

## 2022-06-05 NOTE — PLAN OF CARE
Problem: Chronic Conditions and Co-morbidities  Goal: Patient's chronic conditions and co-morbidity symptoms are monitored and maintained or improved  6/4/2022 2108 by Martin Richardson RN  Outcome: Progressing  6/4/2022 1135 by Javy Alan RN  Outcome: Progressing     Problem: Pain  Goal: Verbalizes/displays adequate comfort level or baseline comfort level  6/4/2022 2108 by Martin Richardson RN  Outcome: Progressing  6/4/2022 1135 by Javy Alan RN  Outcome: Progressing     Problem: Skin/Tissue Integrity  Goal: Absence of new skin breakdown  Description: 1. Monitor for areas of redness and/or skin breakdown  2. Assess vascular access sites hourly  3. Every 4-6 hours minimum:  Change oxygen saturation probe site  4. Every 4-6 hours:  If on nasal continuous positive airway pressure, respiratory therapy assess nares and determine need for appliance change or resting period.   6/4/2022 2108 by Martin Richardson RN  Outcome: Progressing  6/4/2022 1135 by Javy Alan RN  Outcome: Progressing     Problem: Safety - Adult  Goal: Free from fall injury  6/4/2022 2108 by Martin Richardson RN  Outcome: Progressing  6/4/2022 1135 by Javy Alan RN  Outcome: Progressing     Problem: ABCDS Injury Assessment  Goal: Absence of physical injury  6/4/2022 2108 by Martin Richardson RN  Outcome: Progressing  6/4/2022 1135 by Javy Alan RN  Outcome: Progressing

## 2022-06-05 NOTE — PROGRESS NOTES
Today's Date: 6/5/2022  Patient Name: Padmini Alexandra  Date of admission: 6/3/2022  8:21 AM  Patient's age: 59 y.o., 1965  Admission Dx: Anemia [D64.9]  Anemia, unspecified type [D64.9]    Reason for Consult: management recommendations  Requesting Physician: Alysia Shell MD    CHIEF COMPLAINT: Fatigue/patient on chemotherapy    History Obtained From:  Patient      Interval history:    Patient seen and examined  Labs and vitals reviewed  Patient started on IV iron infusions. Hemoglobin 6.8 this morning  Patient continues to be on Eliquis  Will order stool occult blood test  Patient has not had a bowel movement yet        HISTORY OF PRESENT ILLNESS:      The patient is a 59 y.o.  female who is admitted to the hospital for severe fatigue she was supposed to get her chemotherapy for stage IV ovarian cancer today and reported difficulty ambulating no fever or chills abdominal pain nausea vomiting she has constipation and she was admitted for observation, hemoglobin at 6.3 with a platelet at 536 and WBC 13.9    Oncology history as below       DIAGNOSIS:       Recurrent ovarian cancer. Original diagnosis 2005 with multiple recurrences. Diffuse metastasis. CURRENT THERAPY:         Doxil/ Avastin started 9/18/2020. Avastin was discontinued due to recent GI bleeding. Status post recent vascular embolization  S/p seizure disorder. Gemzar started 2/26/2021. Interrupted due to hospitalization and problem with compliance. Evidence of disease progression on CT scan 2/22/22  Add Carboplatin to Gemzar March 2022        Past Medical History:   has a past medical history of Anemia, Bleeding, Cervical cancer ([address] Depression, Diabetes mellitus ([address] GERD (gastroesophageal reflux disease), Hx of blood clots, Hypertension, Metastatic cancer ([address] Ovarian cancer ([address] Post chemo evaluation, and Splenic lesion.     Past Surgical History:   has a past surgical history that includes Hysterectomy, total abdominal; Port Surgery; Tonsillectomy; IR PORT PLACEMENT > 5 YEARS (2020); Anus surgery; Abscess Drainage (); colectomy (2013); IR EMBOLIZATION HEMORRHAGE (10/05/2020); and Cardiac catheterization. Medications:    Reviewed in Epic     Allergies:  Ceftriaxone    Social History:   reports that she has been smoking cigarettes. She has a 20.00 pack-year smoking history. She has never used smokeless tobacco. She reports previous alcohol use. She reports that she does not use drugs. Family History: family history includes Alcohol Abuse in her mother; Cirrhosis in her mother. REVIEW OF SYSTEMS:    Constitutional: No fever or chills. No night sweats,+weight loss ,+ fatigue  Eyes: No eye discharge, double vision, or eye pain   HEENT: negative for sore mouth, sore throat, hoarseness and voice change   Respiratory: negative for cough , sputum, dyspnea, wheezing, hemoptysis, chest pain   Cardiovascular: negative for chest pain, dyspnea, palpitations, orthopnea, PND   Gastrointestinal: negative for nausea, vomiting, diarrhea, constipation, abdominal pain, Dysphagia, hematemesis and hematochezia   Genitourinary: negative for frequency, dysuria, nocturia, urinary incontinence, and hematuria   Integument: negative for rash, skin lesions, bruises.    Hematologic/Lymphatic: negative for easy bruising, bleeding, lymphadenopathy, or petechiae   Endocrine: negative for heat or cold intolerance,weight changes, change in bowel habits and hair loss   Musculoskeletal: negative for myalgias, arthralgias, pain, joint swelling,and bone pain   Neurological: negative for headaches, dizziness, seizures, weakness, numbness    PHYSICAL EXAM:      /65   Pulse (!) 107   Temp 99.1 °F (37.3 °C) (Oral)   Resp 18   Wt 160 lb (72.6 kg)   SpO2 100%   BMI 26.63 kg/m²    Temp (24hrs), Av.7 °F (36.5 °C), Min:97.3 °F (36.3 °C), Max:99.1 °F (37.3 °C)    General appearance -ill looking2  Mental status - alert and cooperative   Eyes - pupils equal and reactive, extraocular eye movements intact   Ears - bilateral TM's and external ear canals normal   Mouth - mucous membranes moist, pharynx normal without lesions   Neck - supple, no significant adenopathy   Lymphatics - no palpable lymphadenopathy, no hepatosplenomegaly   Chest - clear to auscultation, no wheezes, rales or rhonchi, symmetric air entry   Heart - normal rate, regular rhythm, normal S1, S2, no murmurs  Abdomen - soft, nontender, nondistended, no masses or organomegaly   Neurological - alert, oriented, normal speech, no focal findings or movement disorder noted   Musculoskeletal - no joint tenderness, deformity or swelling   Extremities - peripheral pulses normal, no pedal edema, no clubbing or cyanosis   Skin - normal coloration and turgor, no rashes, no suspicious skin lesions noted ,    DATA:    Labs:   CBC:   Recent Labs     06/04/22  0419 06/05/22  0626   WBC 13.0* 12.1*   HGB 7.3* 6.8*   HCT 24.6* 23.6*   * 533*     BMP:   Recent Labs     06/03/22  0921 06/05/22  0850   * 135   K 3.6* 3.5*   CO2 24 22   BUN 15 12   CREATININE 0.52 0.40*   LABGLOM >60 >60   GLUCOSE 131* 107*     PT/INR: No results for input(s): PROTIME, INR in the last 72 hours. IMAGING DATA:  CT ABDOMEN PELVIS W IV CONTRAST Additional Contrast? None   Final Result   Worsened diffuse colonic distension with stool and air-filled colon and wall   thickening. Suggestion of a segment of narrowing of the rectosigmoid colon   redemonstrated, similar or slightly improved from prior study. Chronic   colonic obstruction/stricture of versus Lucien syndrome are considerations. Correlate with direct visualization if indicated. Unchanged masslike partially calcified lesion in the pelvis may be compatible   with known history of malignancy or post treatment changes in this location.       mild improved left inguinal lymphadenopathy, otherwise stable retroperitoneal   lymphadenopathy with some partially calcified lymph nodes. Worsened subcutaneous soft tissue edema most significant at the left lower   proximal thigh could be related to lymphedema. Stable osseous metastatic disease. XR CHEST PORTABLE   Final Result   Stable support line      Mild prominence of interstitial markings suggests mild vascular congestion             Primary Problem  Anemia    Active Hospital Problems    Diagnosis Date Noted    Iron deficiency [E61.1]      Priority: Medium    Constipation [K59.00]      Priority: Medium    Gynecologic cancer [facility]) [C57.9] 05/29/2021    Chronic deep vein thrombosis (DVT) of femoral vein of left lower extremity (HCC) [I82.512] 03/11/2021    Fatigue [R53.83]     Anemia [D64.9]     Seizure ([address] [R56.9] 10/21/2020         IMPRESSION:   1. Severe anemia related to chemotherapy/myelosuppression at the time of anemia of chronic disease/cancer  2. Recurrent metastatic ovarian cancer  3. Severe weakness fatigue due to above  4. Thrombocytosis likely reactive  5. Leukocytosis  6. Constipation/concern about bowel obstruction with abnormal CT of the abdomen      RECOMMENDATIONS:  1. I reviewed the laboratory data, imaging studies, discussed the diagnosis and possible treatment   2. Blood transfusion to keep hemoglobin above 7  3.  slightly improved  4. No plans for chemotherapy while in-house  5. Continued IV iron course  6. Continue symptomatic supportive care    Discussed with patient and Nurse. Thank you for asking us to see this patient. Mariano Urbina MD                 This note is created with the assistance of a speech recognition program.  While intending to generate a document that actually reflects the content of the visit, the document can still have some errors including those of syntax and sound a like substitutions which may escape proof reading. It such instances, actual meaning can be extrapolated by contextual diversion.

## 2022-06-05 NOTE — PROGRESS NOTES
OBS/CDU   RESIDENT NOTE      Patients PCP is:  Shanda Hinds DO        SUBJECTIVE      No acute events overnight. Tolerating p.o. Noted still some weakness. Per oncology, recommended to transfuse below 7 hemoglobin. Plan to transfuse again. Did have a bowel movement yesterday afternoon after enema but has not had any further bowel movements per patient. PHYSICAL EXAM      General: NAD, AO X 3  Heent: EMOI, PERRL  Neck: SUPPLE, NO JVD  Cardiovascular: RRR, S1S2  Pulmonary: CTAB, NO SOB  Abdomen: SOFT, NTTP, ND, +BS  Extremities: +2/4 PULSES DISTAL, NO SWELLING  Neuro / Psych: NO NUMBNESS OR TINGLING, MENTATION AT BASELINE    PERTINENT TEST /EXAMS      I have reviewed all available laboratory results.     MEDICATIONS CURRENT   0.9 % sodium chloride infusion, PRN  bisacodyl (DULCOLAX) suppository 10 mg, Daily PRN  bisacodyl (DULCOLAX) suppository 10 mg, TID PRN  iron sucrose (VENOFER) 300 mg in sodium chloride 0.9 % 250 mL IVPB, Q24H  0.9 % sodium chloride infusion, PRN  apixaban (ELIQUIS) tablet 5 mg, BID  benzonatate (TESSALON) capsule 100 mg, TID PRN  hydrocortisone (ANUSOL-HC) 2.5 % rectal cream, BID  lamoTRIgine (LAMICTAL) tablet 125 mg, BID  levETIRAcetam (KEPPRA) tablet 1,000 mg, BID  LORazepam (ATIVAN) tablet 1 mg, Q8H PRN  melatonin tablet 5 mg, Nightly PRN  morphine (MS CONTIN) extended release tablet 15 mg, BID  morphine (MS CONTIN) extended release tablet 30 mg, BID  ondansetron (ZOFRAN-ODT) disintegrating tablet 4 mg, Q8H PRN  oxyCODONE-acetaminophen (PERCOCET) 5-325 MG per tablet 1 tablet, Q4H PRN  pantoprazole (PROTONIX) tablet 40 mg, Daily  QUEtiapine (SEROQUEL) tablet 25 mg, Nightly  QUEtiapine (SEROQUEL) tablet 50 mg, Nightly  senna (SENOKOT) tablet 8.6 mg, BID  sertraline (ZOLOFT) tablet 100 mg, Daily  docusate sodium (COLACE) capsule 100 mg, Daily  sodium chloride flush 0.9 % injection 5-40 mL, 2 times per day  sodium chloride flush 0.9 % injection 5-40 mL, PRN  0.9 % sodium chloride infusion, PRN  acetaminophen (TYLENOL) tablet 650 mg, Q4H PRN  ondansetron (ZOFRAN-ODT) disintegrating tablet 4 mg, Q8H PRN   Or  ondansetron (ZOFRAN) injection 4 mg, Q6H PRN        All medication charted and reviewed. CONSULTS      IP CONSULT TO IV TEAM  IP CONSULT TO ONCOLOGY  IP CONSULT TO HOSPITALIST  IP CONSULT TO GI  IP CONSULT TO SURGICAL CRITICAL CARE TEAM    ASSESSMENT/PLAN       Mario Alex is a 59 y.o. female who presents with stage IV ovarian cancer, generalized weakness, fatigue. Found to be anemic and required 1 unit PRBC in ED. Placed in observation for GI consult due to CT abdomen pelvis showing chronic obstruction versus ileus. Patient was able to have bowel movement after enema yesterday.     · Anemia- hemoglobin 6.8 from 7.3 yesterday, status post 1 unit PRBC 6/3/2022, ordered second unit  · Oncology consulted, appreciate recommendations-recommended transfusion for less than 7 hemoglobin  · GI consulted, appreciate recommendations-recommended bowel regimen as patient symptoms are chronic and signed off  · Continue home medications and pain control  · Monitor vitals, labs, and imaging  · DISPO: pending consults and clinical improvement  ·     --  Hazel Mark MD  Emergency Medicine Resident Physician     This dictation was generated by voice recognition computer software. Although all attempts are made to edit the dictation for accuracy, there may be errors in the transcription that are not intended.

## 2022-06-05 NOTE — H&P
[address]     Department of Internal Medicine - Staff Internal Medicine Teaching Service          ADMISSION NOTE/HISTORY AND PHYSICAL EXAMINATION   Date: 6/5/2022  Patient Name: Padmini Alexandra  Date of admission: 6/3/2022  8:21 AM  YOB: 1965  PCP: Carey Haskins DO  History Obtained From:  patient, electronic medical record    CHIEF COMPLAINT     Chief complaint: Fatigue and generalized weakness    HISTORY OF PRESENTING ILLNESS     The patient is a pleasant 59 y.o. female  with significant past medical history of stage IV ovarian cancer currently on chemotherapy presents with fatigue and generalized weakness. Patient also had constipation for over past 4 days since admission. She denied nausea, vomiting but did complain of mild abdominal pain in the periumbilical region of 7/76 intensity for a few months. Patient states that she has missed her chemotherapy on 6/3/2022 due to generalized weakness. On arrival to the ED hemoglobin was checked and it was 6.3 and she received 1 unit PRBC. She was noted to be tachycardic at the time. CT abdomen pelvis showed colonic distention consistent with chronic ileus versus chronic obstruction. Patient was initially placed in observation unit for GI consult and now has been admitted to internal medicine service for further management. GI suggested that the chronic sigmoid stricture is not traversable by colonoscopy and advised to continue on stool softeners with intermittent use of MiraLAX. GI signed off. 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 On evaluation, patient denied headache, cough, chest pain, numbness, weakness, shortness of breath, abdominal pain or urinary symptoms.   Patient  is afebrile and hemodynamically stable with /65, heart rate 107. She is saturating well on 2 L nasal cannula. Initial hemoglobin was 6.3 after which she received 1 unit PRBC and it trended up to 8.1. Hemoglobin trended down to 7.3 and 6.8 later with another unit of PRBC transfusion. No active clinical signs of bleeding noted. Review of Systems:  General ROS: Completed and except as mentioned above were negative   HEENT ROS: Completed and except as mentioned above were negative   Allergy and Immunology ROS:  Completed and except as mentioned above were negative  Hematological and Lymphatic ROS:  Completed and except as mentioned above were negative  Respiratory ROS:  Completed and except as mentioned above were negative  Cardiovascular ROS:  Completed and except as mentioned above were negative  Gastrointestinal ROS: Completed and except as mentioned above were negative  Genito-Urinary ROS:  Completed and except as mentioned above were negative  Musculoskeletal ROS:  Completed and except as mentioned above were negative  Neurological ROS:  Completed and except as mentioned above were negative  Skin & Dermatological ROS:  Completed and except as mentioned above were negative  Psychological ROS:  Completed and except as mentioned above were negative    PAST MEDICAL HISTORY     Past Medical History:   Diagnosis Date    Anemia     Bleeding 10/2020    intra-abdominal bleeding -due to splenic mass with GI infiltration. Status post embolization    [facility] [address]     Depression     Diabetes mellitus ([address]     GERD (gastroesophageal reflux disease)     Hx of blood clots     Hypertension     Metastatic cancer ([address] 10/2020    extensive intraabdominal and splenic involvement and lung mets.     Ovarian cancer ([facility] [address]     low grade serous ovarian carcinoma    Post chemo evaluation     2007: Chemo via med onc (Dr. Makenzie Frias), 2008: Chemo due to rising CA-125, 2013: intraperitoneal chemo,12/2015: Ca125 - 25     Splenic lesion        PAST SURGICAL HISTORY     Past Surgical History:   Procedure Laterality Date    ABSCESS DRAINAGE  2013    Franca rectal    ANUS SURGERY      ANAL FISSURECTOMY    CARDIAC CATHETERIZATION      COLECTOMY  03/2013    ex lap, tumor debulking, transverse colectomy w reanastamosis, subgastric omentectomy, intraperitoneal port placement    HYSTERECTOMY, TOTAL ABDOMINAL      IR EMBOLIZATION HEMORRHAGE  10/05/2020    intra-abdominal bleeding -due to splenic mass with GI infiltration. Status post embolization [employer] interlock coils x7. mri condtional 3t ok, safe immediately post implant.  IR PORT PLACEMENT EQUAL OR GREATER THAN 5 YEARS  08/24/2020    IR PORT PLACEMENT EQUAL OR GREATER THAN 5 YEARS 8/24/2020 Rebekah Dai MD [facility]    PORT SURGERY      IP Port    TONSILLECTOMY         ALLERGIES     Ceftriaxone    MEDICATIONS PRIOR TO ADMISSION     Prior to Admission medications    Medication Sig Start Date End Date Taking? Authorizing Provider   morphine (MS CONTIN) 15 MG extended release tablet TAKE 1 TABLET BY MOUTH 2 TIMES DAILY FOR 30 DAYS. 5/19/22 6/18/22  Ashley Bhatia MD   morphine (MS CONTIN) 30 MG extended release tablet TAKE 1 TABLET BY MOUTH 2 TIMES DAILY FOR 30 DAYS. 5/19/22 6/18/22  Lupis Dillon MD   oxyCODONE-acetaminophen (PERCOCET) 5-325 MG per tablet Take 1 tablet by mouth every 4 hours as needed for Pain for up to 30 days. 5/13/22 6/12/22  Ashley Bhatia MD   LORazepam (ATIVAN) 1 MG tablet Take 1 tablet by mouth every 8 hours as needed for Anxiety for up to 30 doses.  5/13/22 6/24/22  Lupis Dillon MD   ondansetron (ZOFRAN-ODT) 4 MG disintegrating tablet Take 1 tablet by mouth every 8 hours as needed for Nausea or Vomiting 5/13/22   Lupis Dillon MD   QUEtiapine (SEROQUEL) 25 MG tablet 1 TABLET ALONG WITH 50MG AT BEDTIME 5/9/22   Ashley Bhatia MD   pantoprazole (PROTONIX) 40 MG tablet TAKE 1 TABLET BY MOUTH DAILY 5/9/22   Justus Bhatia, MD   senna (SENOKOT) 8.6 MG tablet Take 1 tablet by mouth 2 times daily 22  Jesus Thomas MD   QUEtiapine (SEROQUEL) 50 MG tablet TAKE 1 TABLET BY MOUTH ONCE DAILY AT BEDTIME ALONG WITH 25MG 22   Dmitry Hunter MD   melatonin 5 MG TABS tablet Take 1 tablet by mouth daily 22   Shanda Medhkour, DO   benzonatate (TESSALON PERLES) 100 MG capsule Take 1 capsule by mouth 3 times daily as needed for Cough 22   Shanda Medhkour, DO   apixaban (ELIQUIS) 5 MG TABS tablet Take 1 tablet by mouth 2 times daily 22   Dmitry Hunter MD   sertraline (ZOLOFT) 100 MG tablet TAKE 1 TABLET BY MOUTH DAILY 3/21/22 4/20/22  Dmitry Hunter MD   levETIRAcetam (KEPPRA) 1000 MG tablet Take 1 tablet by mouth 2 times daily 22   Deana Sanchez MD   melatonin 5 MG TABS tablet Take 1 tablet by mouth nightly as needed (sleep) 22  Deana Sanchez MD   hydrocortisone (ANUSOL-HC) 2.5 % CREA rectal cream Apply on affected area twice daily 22   Deana Sanchez MD   lamoTRIgine (LAMICTAL) 25 MG tablet Take 5 tablets by mouth 2 times daily 22   Deana Sanchez MD   ferrous sulfate (IRON 325) 325 (65 Fe) MG tablet Take 1 tablet by mouth daily (with breakfast) 10/31/21   Mesfin Gonzalez MD       SOCIAL HISTORY     Tobacco: Current everyday smoker of 20 pack years. Alcohol: Not currently  Illicits: Never  Occupation: Not on file    FAMILY HISTORY     Family History   Problem Relation Age of Onset    Alcohol Abuse Mother     Cirrhosis Mother        PHYSICAL EXAM     Vitals: /78   Pulse (!) 110   Temp 97.3 °F (36.3 °C)   Resp 18   Wt 160 lb (72.6 kg)   SpO2 99%   BMI 26.63 kg/m²   Tmax: Temp (24hrs), Av.5 °F (36.4 °C), Min:97.3 °F (36.3 °C), Max:97.7 °F (36.5 °C)    Last Body weight:   Wt Readings from Last 3 Encounters:   22 160 lb (72.6 kg)   22 170 lb (77.1 kg)   22 168 lb 8 oz (76.4 kg)     Body Mass Index : Body mass index is 26.63 kg/m².       PHYSICAL EXAMINATION:  Constitutional: This is a well developed, well nourished, 25-29.9 - Overweight 59y.o. year old female who is alert, oriented, cooperative and in no apparent distress. Head:normocephalic and atraumatic. Respiratory:  Breath sounds bilaterally were clear to auscultation. There were no wheezes, rhonchi or rales. There is no intercostal retraction or use of accessory muscles. No egophony noted. Cardiovascular: Regular without murmur, clicks, gallops or rubs. Abdomen: Slightly rounded and soft without organomegaly. No rebound, rigidity or guarding was appreciated. Extremities:  No lower extremity edema, ulcerations, tenderness, varicosities or erythema. Muscle size, tone and strength are normal.  No involuntary movements are noted. Skin:  Warm and dry. Good color, turgor and pigmentation. No lesions or scars.   No cyanosis or clubbing  Neurological/Psychiatric: The patient's general behavior, level of consciousness, thought content and emotional status is normal.          INVESTIGATIONS     Laboratory Testing:     Recent Results (from the past 24 hour(s))   CBC with Auto Differential    Collection Time: 06/05/22  6:26 AM   Result Value Ref Range    WBC 12.1 (H) 3.5 - 11.3 k/uL    RBC 2.48 (L) 3.95 - 5.11 m/uL    Hemoglobin 6.8 (LL) 11.9 - 15.1 g/dL    Hematocrit 23.6 (L) 36.3 - 47.1 %    MCV 95.2 82.6 - 102.9 fL    MCH 27.4 25.2 - 33.5 pg    MCHC 28.8 28.4 - 34.8 g/dL    RDW 19.7 (H) 11.8 - 14.4 %    Platelets 819 (H) 205 - 453 k/uL    MPV 9.2 8.1 - 13.5 fL    NRBC Automated 0.7 (H) 0.0 per 100 WBC    Immature Granulocytes 0 0 %    Seg Neutrophils 80 (H) 36 - 66 %    Lymphocytes 7 (L) 24 - 44 %    Monocytes 11 (H) 1 - 7 %    Eosinophils % 1 1 - 4 %    Basophils 1 0 - 2 %    nRBC 1 (H) 0 per 100 WBC    Absolute Immature Granulocyte 0.00 0.00 - 0.30 k/uL    Segs Absolute 9.68 (H) 1.8 - 7.7 k/uL    Absolute Lymph # 0.85 (L) 1.0 - 4.8 k/uL    Absolute Mono # 1.33 (H) 0.1 - 0.8 k/uL    Absolute Eos # 0.12 0.0 - 0.4 k/uL    Basophils Absolute 0.12 0.0 - 0.2 k/uL    Morphology ANISOCYTOSIS PRESENT     Morphology 1+ POLYCHROMASIA    Basic Metabolic Panel    Collection Time: 06/05/22  8:50 AM   Result Value Ref Range    Glucose 107 (H) 70 - 99 mg/dL    BUN 12 6 - 20 mg/dL    CREATININE 0.40 (L) 0.50 - 0.90 mg/dL    Calcium 7.4 (L) 8.6 - 10.4 mg/dL    Sodium 135 135 - 144 mmol/L    Potassium 3.5 (L) 3.7 - 5.3 mmol/L    Chloride 103 98 - 107 mmol/L    CO2 22 20 - 31 mmol/L    Anion Gap 10 9 - 17 mmol/L    GFR Non-African American >60 >60 mL/min    GFR African American >60 >60 mL/min    GFR Comment             Imaging:   CT ABDOMEN PELVIS W IV CONTRAST Additional Contrast? None    Result Date: 6/3/2022  Worsened diffuse colonic distension with stool and air-filled colon and wall thickening. Suggestion of a segment of narrowing of the rectosigmoid colon redemonstrated, similar or slightly improved from prior study. Chronic colonic obstruction/stricture of versus [place] syndrome are considerations. Correlate with direct visualization if indicated. Unchanged masslike partially calcified lesion in the pelvis may be compatible with known history of malignancy or post treatment changes in this location. mild improved left inguinal lymphadenopathy, otherwise stable retroperitoneal lymphadenopathy with some partially calcified lymph nodes. Worsened subcutaneous soft tissue edema most significant at the left lower proximal thigh could be related to lymphedema. Stable osseous metastatic disease.      XR CHEST PORTABLE    Result Date: 6/3/2022  Stable support line Mild prominence of interstitial markings suggests mild vascular congestion       ASSESSMENT & PLAN     ASSESSMENT / PLAN:     Principal Problem:    Anemia  Active Problems:    Iron deficiency    Constipation    Seizure (HCC)    Fatigue    Chronic deep vein thrombosis (DVT) of femoral vein of left lower extremity [facility])    Gynecologic cancer (HCC)  Resolved Problems:    * No resolved hospital problems. *    1. Severe anemia related to chemotherapy/myelosuppression at the time of anemia of chronic disease/cancer   - Fatigue and weakness secondary to anemia.  - Initial hemoglobin was 6.3 after which she received 1 unit PRBC on 6/3/2022 and it trended up to 8.1. Hemoglobin trended down to 7.3 and 6.8 on 6/5/2022 later with another unit of PRBC transfusion.  -  No active clinical signs of bleeding noted. - Monitor H and H.   - Transfuse if Hb < 7.    2. Iron deficiency   - Iron level 8 on 6/4/2022. Normal MCV (in contrast to low MCV) and elevated RDW. - Placed on VENOFER  mg every 24 hours for 2 doses. 3. Constipation   -  CT abdomen pelvis showed colonic distention consistent with chronic ileus versus chronic obstruction. - GI consulted who suggested that patient has a  chronic sigmoid stricture not traversable by colonoscopy and advised to continue on stool softeners with intermittent use of MiraLAX. 4. History of seizure disorder   - On KEPPRA 1 gm oral BID and LAMICTAL 125 mg oral BID. 5. Chronic DVT of left femoral vein  - On ELIQUIS 5 mgm oral BID. 6. Stage IV metastatic ovarian cancer   - Hem onc on board. Follow up recommendations. - Currently on chemotherapy. - On ELIQUIS for anticoagulation. DVT ppx: On ELIQUIS 5 mg oral BID  GI ppx: Protonix 40 mg oral daily    PT/OT/SW: Consulted  Discharge Planning:  to assist with    Consuelo Carey MD  Internal Medicine Resident, PGY-1  [facility];  [place], [place]  6/5/2022, 9:26 AM

## 2022-06-06 LAB
ABO/RH: NORMAL
ABSOLUTE EOS #: 0.15 K/UL (ref 0–0.4)
ABSOLUTE IMMATURE GRANULOCYTE: 0.44 K/UL (ref 0–0.3)
ABSOLUTE LYMPH #: 0.74 K/UL (ref 1–4.8)
ABSOLUTE MONO #: 0.89 K/UL (ref 0.1–0.8)
ANTIBODY SCREEN: NEGATIVE
ARM BAND NUMBER: NORMAL
BASOPHILS # BLD: 2 % (ref 0–2)
BASOPHILS ABSOLUTE: 0.3 K/UL (ref 0–0.2)
BLD PROD TYP BPU: NORMAL
BLD PROD TYP BPU: NORMAL
BLOOD BANK BLOOD PRODUCT EXPIRATION DATE: NORMAL
BLOOD BANK BLOOD PRODUCT EXPIRATION DATE: NORMAL
BLOOD BANK ISBT PRODUCT BLOOD TYPE: 6200
BLOOD BANK ISBT PRODUCT BLOOD TYPE: 6200
BLOOD BANK PRODUCT CODE: NORMAL
BLOOD BANK PRODUCT CODE: NORMAL
BLOOD BANK UNIT TYPE AND RH: NORMAL
BLOOD BANK UNIT TYPE AND RH: NORMAL
BPU ID: NORMAL
BPU ID: NORMAL
CROSSMATCH RESULT: NORMAL
CROSSMATCH RESULT: NORMAL
DATE, STOOL #1: ABNORMAL
DISPENSE STATUS BLOOD BANK: NORMAL
DISPENSE STATUS BLOOD BANK: NORMAL
EOSINOPHILS RELATIVE PERCENT: 1 % (ref 1–4)
EXPIRATION DATE: NORMAL
HCT VFR BLD CALC: 29.7 % (ref 36.3–47.1)
HEMOCCULT SP1 STL QL: POSITIVE
HEMOGLOBIN: 8.8 G/DL (ref 11.9–15.1)
IMMATURE GRANULOCYTES: 3 %
LYMPHOCYTES # BLD: 5 % (ref 24–44)
MCH RBC QN AUTO: 28.2 PG (ref 25.2–33.5)
MCHC RBC AUTO-ENTMCNC: 29.6 G/DL (ref 28.4–34.8)
MCV RBC AUTO: 95.2 FL (ref 82.6–102.9)
MONOCYTES # BLD: 6 % (ref 1–7)
MORPHOLOGY: ABNORMAL
MORPHOLOGY: ABNORMAL
NRBC AUTOMATED: 0.3 PER 100 WBC
PDW BLD-RTO: 19.6 % (ref 11.8–14.4)
PLATELET # BLD: 536 K/UL (ref 138–453)
PMV BLD AUTO: 9 FL (ref 8.1–13.5)
RBC # BLD: 3.12 M/UL (ref 3.95–5.11)
SEG NEUTROPHILS: 83 % (ref 36–66)
SEGMENTED NEUTROPHILS ABSOLUTE COUNT: 12.28 K/UL (ref 1.8–7.7)
TIME, STOOL #1: ABNORMAL
TRANSFUSION STATUS: NORMAL
TRANSFUSION STATUS: NORMAL
UNIT DIVISION: 0
UNIT DIVISION: 0
UNIT ISSUE DATE/TIME: NORMAL
UNIT ISSUE DATE/TIME: NORMAL
WBC # BLD: 14.8 K/UL (ref 3.5–11.3)

## 2022-06-06 PROCEDURE — 97116 GAIT TRAINING THERAPY: CPT

## 2022-06-06 PROCEDURE — 82270 OCCULT BLOOD FECES: CPT

## 2022-06-06 PROCEDURE — 6360000002 HC RX W HCPCS: Performed by: STUDENT IN AN ORGANIZED HEALTH CARE EDUCATION/TRAINING PROGRAM

## 2022-06-06 PROCEDURE — 99232 SBSQ HOSP IP/OBS MODERATE 35: CPT | Performed by: INTERNAL MEDICINE

## 2022-06-06 PROCEDURE — 36415 COLL VENOUS BLD VENIPUNCTURE: CPT

## 2022-06-06 PROCEDURE — 6370000000 HC RX 637 (ALT 250 FOR IP): Performed by: STUDENT IN AN ORGANIZED HEALTH CARE EDUCATION/TRAINING PROGRAM

## 2022-06-06 PROCEDURE — 97162 PT EVAL MOD COMPLEX 30 MIN: CPT

## 2022-06-06 PROCEDURE — 97530 THERAPEUTIC ACTIVITIES: CPT

## 2022-06-06 PROCEDURE — 85025 COMPLETE CBC W/AUTO DIFF WBC: CPT

## 2022-06-06 PROCEDURE — 1200000000 HC SEMI PRIVATE

## 2022-06-06 PROCEDURE — 6370000000 HC RX 637 (ALT 250 FOR IP)

## 2022-06-06 PROCEDURE — 2580000003 HC RX 258: Performed by: STUDENT IN AN ORGANIZED HEALTH CARE EDUCATION/TRAINING PROGRAM

## 2022-06-06 RX ORDER — FUROSEMIDE 10 MG/ML
20 INJECTION INTRAMUSCULAR; INTRAVENOUS ONCE
Status: COMPLETED | OUTPATIENT
Start: 2022-06-06 | End: 2022-06-06

## 2022-06-06 RX ORDER — FENTANYL CITRATE 50 UG/ML
25 INJECTION, SOLUTION INTRAMUSCULAR; INTRAVENOUS ONCE
Status: COMPLETED | OUTPATIENT
Start: 2022-06-06 | End: 2022-06-06

## 2022-06-06 RX ORDER — POTASSIUM CHLORIDE 20 MEQ/1
40 TABLET, EXTENDED RELEASE ORAL ONCE
Status: COMPLETED | OUTPATIENT
Start: 2022-06-06 | End: 2022-06-06

## 2022-06-06 RX ADMIN — LAMOTRIGINE 125 MG: 100 TABLET ORAL at 08:26

## 2022-06-06 RX ADMIN — FUROSEMIDE 20 MG: 10 INJECTION, SOLUTION INTRAMUSCULAR; INTRAVENOUS at 11:45

## 2022-06-06 RX ADMIN — ONDANSETRON 4 MG: 4 TABLET, ORALLY DISINTEGRATING ORAL at 18:10

## 2022-06-06 RX ADMIN — MORPHINE SULFATE 30 MG: 30 TABLET, FILM COATED, EXTENDED RELEASE ORAL at 20:22

## 2022-06-06 RX ADMIN — MORPHINE SULFATE 30 MG: 30 TABLET, FILM COATED, EXTENDED RELEASE ORAL at 09:42

## 2022-06-06 RX ADMIN — DOCUSATE SODIUM 100 MG: 100 CAPSULE ORAL at 08:26

## 2022-06-06 RX ADMIN — POTASSIUM CHLORIDE 40 MEQ: 1500 TABLET, EXTENDED RELEASE ORAL at 06:29

## 2022-06-06 RX ADMIN — APIXABAN 5 MG: 5 TABLET, FILM COATED ORAL at 20:21

## 2022-06-06 RX ADMIN — SENNOSIDES 8.6 MG: 8.6 TABLET, COATED ORAL at 08:26

## 2022-06-06 RX ADMIN — OXYCODONE HYDROCHLORIDE AND ACETAMINOPHEN 1 TABLET: 5; 325 TABLET ORAL at 11:46

## 2022-06-06 RX ADMIN — QUETIAPINE FUMARATE 50 MG: 25 TABLET ORAL at 20:21

## 2022-06-06 RX ADMIN — ONDANSETRON 4 MG: 2 INJECTION INTRAMUSCULAR; INTRAVENOUS at 10:11

## 2022-06-06 RX ADMIN — LEVETIRACETAM 1000 MG: 500 TABLET, FILM COATED ORAL at 08:26

## 2022-06-06 RX ADMIN — SENNOSIDES 8.6 MG: 8.6 TABLET, COATED ORAL at 20:21

## 2022-06-06 RX ADMIN — FENTANYL CITRATE 25 MCG: 50 INJECTION, SOLUTION INTRAMUSCULAR; INTRAVENOUS at 13:23

## 2022-06-06 RX ADMIN — LAMOTRIGINE 125 MG: 100 TABLET ORAL at 20:21

## 2022-06-06 RX ADMIN — OXYCODONE HYDROCHLORIDE AND ACETAMINOPHEN 1 TABLET: 5; 325 TABLET ORAL at 21:28

## 2022-06-06 RX ADMIN — MORPHINE SULFATE 15 MG: 30 TABLET, FILM COATED, EXTENDED RELEASE ORAL at 09:41

## 2022-06-06 RX ADMIN — OXYCODONE HYDROCHLORIDE AND ACETAMINOPHEN 1 TABLET: 5; 325 TABLET ORAL at 07:38

## 2022-06-06 RX ADMIN — SODIUM CHLORIDE, PRESERVATIVE FREE 10 ML: 5 INJECTION INTRAVENOUS at 08:30

## 2022-06-06 RX ADMIN — OXYCODONE HYDROCHLORIDE AND ACETAMINOPHEN 1 TABLET: 5; 325 TABLET ORAL at 15:55

## 2022-06-06 RX ADMIN — MORPHINE SULFATE 15 MG: 30 TABLET, FILM COATED, EXTENDED RELEASE ORAL at 20:22

## 2022-06-06 RX ADMIN — APIXABAN 5 MG: 5 TABLET, FILM COATED ORAL at 08:28

## 2022-06-06 RX ADMIN — SERTRALINE 100 MG: 50 TABLET, FILM COATED ORAL at 08:26

## 2022-06-06 RX ADMIN — PANTOPRAZOLE SODIUM 40 MG: 40 TABLET, DELAYED RELEASE ORAL at 08:26

## 2022-06-06 RX ADMIN — LORAZEPAM 1 MG: 0.5 TABLET ORAL at 08:26

## 2022-06-06 RX ADMIN — LEVETIRACETAM 1000 MG: 500 TABLET, FILM COATED ORAL at 20:21

## 2022-06-06 ASSESSMENT — PAIN SCALES - GENERAL
PAINLEVEL_OUTOF10: 7
PAINLEVEL_OUTOF10: 8
PAINLEVEL_OUTOF10: 9
PAINLEVEL_OUTOF10: 9
PAINLEVEL_OUTOF10: 8
PAINLEVEL_OUTOF10: 9
PAINLEVEL_OUTOF10: 9
PAINLEVEL_OUTOF10: 10

## 2022-06-06 ASSESSMENT — PAIN DESCRIPTION - DESCRIPTORS: DESCRIPTORS: ACHING;DISCOMFORT

## 2022-06-06 ASSESSMENT — PAIN DESCRIPTION - LOCATION: LOCATION: ABDOMEN

## 2022-06-06 ASSESSMENT — PAIN DESCRIPTION - ORIENTATION: ORIENTATION: LEFT;RIGHT;MID

## 2022-06-06 NOTE — PLAN OF CARE
Problem: Chronic Conditions and Co-morbidities  Goal: Patient's chronic conditions and co-morbidity symptoms are monitored and maintained or improved  Outcome: Progressing     Problem: Pain  Goal: Verbalizes/displays adequate comfort level or baseline comfort level  Outcome: Progressing     Problem: Skin/Tissue Integrity  Goal: Absence of new skin breakdown  Description: 1. Monitor for areas of redness and/or skin breakdown  2. Assess vascular access sites hourly  3. Every 4-6 hours minimum:  Change oxygen saturation probe site  4. Every 4-6 hours:  If on nasal continuous positive airway pressure, respiratory therapy assess nares and determine need for appliance change or resting period.   Outcome: Progressing     Problem: Safety - Adult  Goal: Free from fall injury  Outcome: Progressing     Problem: ABCDS Injury Assessment  Goal: Absence of physical injury  Outcome: Progressing     Problem: Hematologic - Adult  Goal: Maintains hematologic stability  Outcome: Progressing

## 2022-06-06 NOTE — PROGRESS NOTES
[facility]  Internal Medicine Teaching Residency Program  Inpatient Daily Progress Note  ______________________________________________________________________________    Patient: Vicky Smith [facility]  YOB: 1965   SZB:[id number]    Acct: [de-identified]     Room: [facility]  Admit date: 6/3/2022  Today's date: 06/06/22  Number of days in the hospital: 1    SUBJECTIVE   Admitting Diagnosis: Anemia  CC: . Chief Complaint   Patient presents with    Fatigue       Pt examined at bedside. Chart & results reviewed. No acute events overnight. Patient states her stomach is cramping. Patient is afebrile and is hemodynamically stable with /65 and pulse 109. She is saturating 99% on 2 L nasal cannula. Labs reviewed BMP shows potassium 3.5-replaced. Hemoglobin trended up to 8.6 s/p 2 units PRBC.    ROS:  Constitutional:  negative for chills, fevers, sweats  Respiratory:  negative for cough, dyspnea on exertion, hemoptysis, shortness of breath, wheezing  Cardiovascular:  negative for chest pain, chest pressure/discomfort, lower extremity edema, palpitations  Gastrointestinal:  positive for abdominal pain. Negative for constipation, diarrhea, nausea, vomiting  Neurological:  negative for dizziness, headache  BRIEF HISTORY     The patient is a pleasant 59 y.o. female  with significant past medical history of stage IV ovarian cancer currently on chemotherapy presents with fatigue and generalized weakness. Patient also had constipation for over past 4 days since admission. She denied nausea, vomiting but did complain of mild abdominal pain in the periumbilical region of 1/90 intensity for a few months.     Patient states that she has missed her chemotherapy on 6/3/2022 due to generalized weakness. On arrival to the ED hemoglobin was checked and it was 6.3 and she received 1 unit PRBC. She was noted to be tachycardic at the time.   CT abdomen pelvis showed colonic distention consistent with chronic ileus versus chronic obstruction. Patient was initially placed in observation unit for GI consult and now has been admitted to internal medicine service for further management.       GI suggested that the chronic sigmoid stricture is not traversable by colonoscopy and advised to continue on stool softeners with intermittent use of MiraLAX. GI signed off.     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     On evaluation, patient denied headache, cough, chest pain, numbness, weakness, shortness of breath, abdominal pain or urinary symptoms. Patient  is afebrile and hemodynamically stable with /65, heart rate 107. She is saturating well on 2 L nasal cannula.     Initial hemoglobin was 6.3 after which she received 1 unit PRBC and it trended up to 8.1. Hemoglobin trended down to 7.3 and 6.8 later with another unit of PRBC transfusion. No active clinical signs of bleeding noted. OBJECTIVE     Vital Signs:  /65   Pulse (!) 109   Temp 98.4 °F (36.9 °C) (Oral)   Resp 15   Wt 160 lb (72.6 kg)   SpO2 97%   BMI 26.63 kg/m²     Temp (24hrs), Av.9 °F (36.6 °C), Min:97.3 °F (36.3 °C), Max:99.1 °F (37.3 °C)    In: 833.6   Out: 760 [Urine:760]    Physical Exam:  Constitutional: This is a well developed, well nourished, 25-29.9 - Overweight 59y.o. year old female who is alert, oriented, cooperative and in no apparent distress. Head:normocephalic and atraumatic. EENT:  PERRLA. No conjunctival injections. Septum was midline, mucosa was without erythema, exudates or cobblestoning. No thrush was noted. Neck: Supple without thyromegaly. No elevated JVP. Trachea was midline. Respiratory: Chest was symmetrical without dullness to percussion.   Breath sounds bilaterally were clear to auscultation. There were no wheezes, rhonchi or rales. There is no intercostal retraction or use of accessory muscles. No egophony noted. Cardiovascular: Regular without murmur, clicks, gallops or rubs. Abdomen: Slightly rounded and soft without organomegaly. No rebound, rigidity or guarding was appreciated. Lymphatic: No lymphadenopathy. Musculoskeletal: Normal curvature of the spine. No gross muscle weakness. Extremities: Bilateral lower extremity pitting edema LLE> RLE. No involuntary movements are noted. Skin:  Warm and dry. Good color, turgor and pigmentation. No lesions or scars.   No cyanosis or clubbing  Neurological/Psychiatric: The patient's general behavior, level of consciousness, thought content and emotional status is normal.        Medications:  Scheduled Medications:    apixaban  5 mg Oral BID    hydrocortisone   Rectal BID    lamoTRIgine  125 mg Oral BID    levETIRAcetam  1,000 mg Oral BID    morphine  15 mg Oral BID    morphine  30 mg Oral BID    pantoprazole  40 mg Oral Daily    QUEtiapine  25 mg Oral Nightly    QUEtiapine  50 mg Oral Nightly    senna  1 tablet Oral BID    sertraline  100 mg Oral Daily    docusate sodium  100 mg Oral Daily    sodium chloride flush  5-40 mL IntraVENous 2 times per day     Continuous Infusions:    sodium chloride      sodium chloride      sodium chloride Stopped (06/05/22 1731)     PRN Medicationssodium chloride, , PRN  bisacodyl, 10 mg, Daily PRN  bisacodyl, 10 mg, TID PRN  sodium chloride, , PRN  benzonatate, 100 mg, TID PRN  LORazepam, 1 mg, Q8H PRN  melatonin, 5 mg, Nightly PRN  ondansetron, 4 mg, Q8H PRN  oxyCODONE-acetaminophen, 1 tablet, Q4H PRN  sodium chloride flush, 5-40 mL, PRN  sodium chloride, , PRN  acetaminophen, 650 mg, Q4H PRN  ondansetron, 4 mg, Q8H PRN   Or  ondansetron, 4 mg, Q6H PRN        Diagnostic Labs:  CBC:   Recent Labs     06/03/22  0921 06/03/22  1249 06/04/22  0419 06/05/22  0626 06/05/22  1523 WBC 13.9*  --  13.0* 12.1*  --    RBC 2.42*  --  2.61* 2.48*  --    HGB 6.3*   < > 7.3* 6.8* 8.6*   HCT 23.9*   < > 24.6* 23.6* 27.8*   MCV 98.8  --  94.3 95.2  --    RDW 19.7*  --  19.8* 19.7*  --    *  --  577* 533*  --     < > = values in this interval not displayed. BMP:   Recent Labs     06/03/22  0921 06/05/22  0850   * 135   K 3.6* 3.5*    103   CO2 24 22   BUN 15 12   CREATININE 0.52 0.40*     BNP: No results for input(s): BNP in the last 72 hours. PT/INR: No results for input(s): PROTIME, INR in the last 72 hours. APTT: No results for input(s): APTT in the last 72 hours. CARDIAC ENZYMES: No results for input(s): CKMB, CKMBINDEX, TROPONINI in the last 72 hours. Invalid input(s): CKTOTAL;3  FASTING LIPID PANEL:  Lab Results   Component Value Date    CHOL 131 03/10/2021    HDL 33 (L) 03/10/2021    TRIG 147 03/10/2021     LIVER PROFILE:   Recent Labs     06/03/22  0921   AST 11   ALT 7   BILITOT <0.10*   ALKPHOS 112*      MICROBIOLOGY:   Lab Results   Component Value Date/Time    CULTURE NO GROWTH 5 DAYS 02/20/2022 11:20 AM       Imaging:    CT ABDOMEN PELVIS W IV CONTRAST Additional Contrast? None    Result Date: 6/3/2022  Worsened diffuse colonic distension with stool and air-filled colon and wall thickening. Suggestion of a segment of narrowing of the rectosigmoid colon redemonstrated, similar or slightly improved from prior study. Chronic colonic obstruction/stricture of versus [place] syndrome are considerations. Correlate with direct visualization if indicated. Unchanged masslike partially calcified lesion in the pelvis may be compatible with known history of malignancy or post treatment changes in this location. mild improved left inguinal lymphadenopathy, otherwise stable retroperitoneal lymphadenopathy with some partially calcified lymph nodes. Worsened subcutaneous soft tissue edema most significant at the left lower proximal thigh could be related to lymphedema. Stable osseous metastatic disease. XR CHEST PORTABLE    Result Date: 6/3/2022  Stable support line Mild prominence of interstitial markings suggests mild vascular congestion       ASSESSMENT & PLAN     ASSESSMENT / PLAN:     Principal Problem:    Anemia  Active Problems:    Iron deficiency    Constipation    Seizure (HCC)    Fatigue    Chronic deep vein thrombosis (DVT) of femoral vein of left lower extremity (HCC)    Gynecologic cancer (HCC)  Resolved Problems:    * No resolved hospital problems. *      1. Severe anemia related to chemotherapy/myelosuppression at the time of anemia of chronic disease/cancer   - Fatigue and weakness secondary to anemia.  - Initial hemoglobin was 6.3 after which she received 1 unit PRBC on 6/3/2022 and it trended up to 8.1. Hemoglobin trended down to 7.3 and 6.8 on 6/5/2022 later with another unit of PRBC transfusion.  -  No active clinical signs of bleeding noted. - Monitor H and H.   - Transfuse if Hb < 7.     2. Iron deficiency   - Iron level 8 on 6/4/2022. Normal MCV (in contrast to low MCV) and elevated RDW. - Placed on VENOFER  mg every 24 hours for 2 doses.     3. Constipation   -  CT abdomen pelvis showed colonic distention consistent with chronic ileus versus chronic obstruction. - GI consulted who suggested that patient has a  chronic sigmoid stricture not traversable by colonoscopy and advised to continue on stool softeners with intermittent use of MiraLAX.     4. History of seizure disorder   - On KEPPRA 1 gm oral BID and LAMICTAL 125 mg oral BID.     5. Chronic DVT of left femoral vein  - On ELIQUIS 5 mgm oral BID.     6. Stage IV metastatic ovarian cancer   - Hem onc on board. Follow up recommendations. - Currently on chemotherapy. - On ELIQUIS for anticoagulation. 7.  Lower extremity pitting edema LLE> RLE  -Likely secondary to stage IV metastatic ovarian cancer/previous DVT of left femoral vein. -Echo from's 2/22/2022 shows LVEF of 55%.     -IV Lasix 20 mg 1 dose being given. Will reassess.        DVT ppx: On ELIQUIS 5 mg oral BID  GI ppx: Protonix 40 mg oral daily     PT/OT/SW: Consulted  Discharge Planning:  to assist with    Saurabh Decker MD  Internal Medicine Resident, PGY-1  [address], [place]  6/6/2022, 6:00 AM

## 2022-06-06 NOTE — PROGRESS NOTES
Physical Therapy  Facility/Department: [facility] ONC/MED SURG  Physical Therapy Initial Assessment    Name: Sammie Corrales  : 1965  MRN: [id number]  Date of Service: 2022  Chief Complaint   Patient presents with    Fatigue   History per EMR  The patient is a pleasant 57 y.o. female  with significant past medical history of stage IV ovarian cancer currently on chemotherapy presents with fatigue and generalized weakness.  Patient also had constipation for over past 4 days since admission.  She denied nausea, vomiting but did complain of mild abdominal pain in the periumbilical region of  intensity for a few months.     Patient states that she has missed her chemotherapy on 6/3/2022 due to generalized weakness.  On arrival to the ED hemoglobin was checked and it was 6.3 and she received 1 unit PRBC.  She was noted to be tachycardic at the time.  CT abdomen pelvis showed colonic distention consistent with chronic ileus versus chronic obstruction.  Patient was initially placed in observation unit for GI consult and now has been admitted to internal medicine service for further management.      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     Initial hemoglobin was 6.3 after which she received 1 unit PRBC and it trended up to 8.1.  Hemoglobin trended down to 7.3 and 6.8 later with another unit of PRBC transfusion. Hgb today 8.6  Discharge Recommendations:  Patient would benefit from continued therapy after discharge for strengthening, gait, balance and stair training as indicated and tolerated   PT Equipment Recommendations  Equipment Needed: No      Patient Diagnosis(es): The encounter diagnosis was Anemia, unspecified type.   Past Medical History:  has a past medical history of Anemia, Bleeding, Cervical cancer ([facility] [address] Depression, Diabetes mellitus ([facility] [address] GERD (gastroesophageal reflux disease), Hx of blood clots, Hypertension, Metastatic cancer ([facility] [address] Ovarian cancer ([facility] [address] Post chemo evaluation, and Splenic lesion. Past Surgical History:  has a past surgical history that includes Hysterectomy, total abdominal; Port Surgery; Tonsillectomy; IR PORT PLACEMENT > 5 YEARS (08/24/2020); Anus surgery; Abscess Drainage (2013); colectomy (03/2013); IR EMBOLIZATION HEMORRHAGE (10/05/2020); and Cardiac catheterization. Assessment   Body Structures, Functions, Activity Limitations Requiring Skilled Therapeutic Intervention: Decreased functional mobility ; Decreased ROM; Decreased body mechanics; Decreased endurance;Decreased balance  Assessment: Pt presents with pt report of decline in functional abiilty and mobilty, demonstrates general weakness and deconditioning with LE edema. Pt able to complete bed mobiltiy and transfers with min assist pt will benefit from furhter PT to progress activity and promote improved fucntionl abilty needed to retur home at highest level of mobilty  Specific Instructions for Next Treatment: progress activity and progress gait  Therapy Prognosis: Guarded  Decision Making: Medium Complexity  Clinical Presentation: progressing  Requires PT Follow-Up: Yes  Activity Tolerance  Activity Tolerance: Patient limited by fatigue;Patient limited by pain; Patient limited by endurance  Activity Tolerance Comments: rest breaks needed with mobiilty and gait     Plan   Plan  Plan: 5-7 times per week  Specific Instructions for Next Treatment: progress activity and progress gait  Current Treatment Recommendations: Strengthening,ROM,Balance training,Functional mobility training,Transfer training,Endurance training,Stair training,Neuromuscular re-education,Home exercise program  Safety Devices  Type of Devices:  All fall risk precautions in place,Bed alarm in place,Gait belt,Chair alarm in place,Left in bed,Nurse assistance  Grooming: Minimal assistance  Feeding: Independent  Toileting: Independent  Homemaking Assistance: Independent  Homemaking Responsibilities: No  Ambulation Assistance: Independent  Transfer Assistance: Independent  Active : No  Patient's  Info: daughter drives  Mode of Transportation: Car  Occupation: On disability  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      Objective   Heart Rate: (!) 106  Heart Rate Source: Monitor  BP: (!) 110/56  BP Location: Left upper arm  BP Method: Automatic  Patient Position: Left side  MAP (Calculated): 74  Resp: 17  SpO2: 98 %  O2 Device: Nasal cannula     Observation/Palpation  Scar: bilateral LE edeme left greater than right  Gross Assessment  AROM: Within functional limits  PROM: Within functional limits  Strength: Generally decreased, functional  Coordination: Within functional limits  Tone: Normal  Sensation: Intact     AROM RLE (degrees)  RLE AROM: WFL  AROM LLE (degrees)  LLE AROM : WFL  AROM RUE (degrees)  RUE AROM : WFL  AROM LUE (degrees)  LUE AROM : WFL  Strength RLE  Strength RLE: WNL  Strength LLE  Strength LLE: WNL  Strength RUE  Strength RUE: WNL  Strength LUE  Strength LUE: WNL  Strength Other  Other: Pt demonstrates general weakness and decondtioning           Bed mobility  Bridging: Independent  Rolling to Left: Stand by assistance  Rolling to Right: Stand by assistance  Supine to Sit: Stand by assistance  Sit to Supine: Minimal None  Education Outcome: Verbalized understanding      Therapy Time   Individual Concurrent Group Co-treatment   Time In 9556         Time Out 1535         Minutes 40         Timed Code Treatment Minutes: 25 Minutes       Seth Jeter PT, DPT

## 2022-06-06 NOTE — PROGRESS NOTES
Today's Date: 6/6/2022  Patient Name: Adelina Lagunas  Date of admission: 6/3/2022  8:21 AM  Patient's age: 59 y.o., 1965  Admission Dx: Anemia [D64.9]  Anemia, unspecified type [D64.9]    Reason for Consult: management recommendations  Requesting Physician: Isabel Francis MD    CHIEF COMPLAINT: Fatigue/patient on chemotherapy    History Obtained From:  Patient      Interval history:    Patient seen and examined  Labs and vitals reviewed  No new complaint interval event  Labs are pending this morning  Stool occult blood test is positive  Patient continues to be weak but denies any deonte bleeding          HISTORY OF PRESENT ILLNESS:      The patient is a 59 y.o.  female who is admitted to the hospital for severe fatigue she was supposed to get her chemotherapy for stage IV ovarian cancer today and reported difficulty ambulating no fever or chills abdominal pain nausea vomiting she has constipation and she was admitted for observation, hemoglobin at 6.3 with a platelet at 587 and WBC 13.9    Oncology history as below       DIAGNOSIS:       Recurrent ovarian cancer. Original diagnosis 2005 with multiple recurrences. Diffuse metastasis. CURRENT THERAPY:         Doxil/ Avastin started 9/18/2020. Avastin was discontinued due to recent GI bleeding. Status post recent vascular embolization  S/p seizure disorder. Gemzar started 2/26/2021. Interrupted due to hospitalization and problem with compliance. Evidence of disease progression on CT scan 2/22/22  Add Carboplatin to Gemzar March 2022        Past Medical History:   has a past medical history of Anemia, Bleeding, Cervical cancer ([address] Depression, Diabetes mellitus ([address] GERD (gastroesophageal reflux disease), Hx of blood clots, Hypertension, Metastatic cancer ([address] Ovarian cancer ([address] Post chemo evaluation, and Splenic lesion.     Past Surgical History:   has a past surgical history that includes Hysterectomy, total abdominal; Port Surgery; Tonsillectomy; IR PORT PLACEMENT > 5 YEARS (2020); Anus surgery; Abscess Drainage (); colectomy (2013); IR EMBOLIZATION HEMORRHAGE (10/05/2020); and Cardiac catheterization. Medications:    Reviewed in Epic     Allergies:  Ceftriaxone    Social History:   reports that she has been smoking cigarettes. She has a 20.00 pack-year smoking history. She has never used smokeless tobacco. She reports previous alcohol use. She reports that she does not use drugs. Family History: family history includes Alcohol Abuse in her mother; Cirrhosis in her mother. REVIEW OF SYSTEMS:    Constitutional: No fever or chills. No night sweats,+weight loss ,+ fatigue  Eyes: No eye discharge, double vision, or eye pain   HEENT: negative for sore mouth, sore throat, hoarseness and voice change   Respiratory: negative for cough , sputum, dyspnea, wheezing, hemoptysis, chest pain   Cardiovascular: negative for chest pain, dyspnea, palpitations, orthopnea, PND   Gastrointestinal: negative for nausea, vomiting, diarrhea, constipation, abdominal pain, Dysphagia, hematemesis and hematochezia   Genitourinary: negative for frequency, dysuria, nocturia, urinary incontinence, and hematuria   Integument: negative for rash, skin lesions, bruises.    Hematologic/Lymphatic: negative for easy bruising, bleeding, lymphadenopathy, or petechiae   Endocrine: negative for heat or cold intolerance,weight changes, change in bowel habits and hair loss   Musculoskeletal: negative for myalgias, arthralgias, pain, joint swelling,and bone pain   Neurological: negative for headaches, dizziness, seizures, weakness, numbness    PHYSICAL EXAM:      /65   Pulse (!) 109   Temp 98.4 °F (36.9 °C) (Oral)   Resp 15   Wt 160 lb (72.6 kg)   SpO2 97%   BMI 26.63 kg/m²    Temp (24hrs), Av.9 °F (36.6 °C), Min:97.3 °F (36.3 °C), Max:99.1 °F (37.3 °C)    General appearance -ill looking2  Mental status - alert and cooperative   Eyes - pupils equal and reactive, extraocular eye movements intact   Ears - bilateral TM's and external ear canals normal   Mouth - mucous membranes moist, pharynx normal without lesions   Neck - supple, no significant adenopathy   Lymphatics - no palpable lymphadenopathy, no hepatosplenomegaly   Chest - clear to auscultation, no wheezes, rales or rhonchi, symmetric air entry   Heart - normal rate, regular rhythm, normal S1, S2, no murmurs  Abdomen - soft, nontender, nondistended, no masses or organomegaly   Neurological - alert, oriented, normal speech, no focal findings or movement disorder noted   Musculoskeletal - no joint tenderness, deformity or swelling   Extremities - peripheral pulses normal, no pedal edema, no clubbing or cyanosis   Skin - normal coloration and turgor, no rashes, no suspicious skin lesions noted ,    DATA:    Labs:   CBC:   Recent Labs     06/04/22  0419 06/04/22  0419 06/05/22  0626 06/05/22  1523   WBC 13.0*  --  12.1*  --    HGB 7.3*   < > 6.8* 8.6*   HCT 24.6*   < > 23.6* 27.8*   *  --  533*  --     < > = values in this interval not displayed. BMP:   Recent Labs     06/03/22  0921 06/05/22  0850   * 135   K 3.6* 3.5*   CO2 24 22   BUN 15 12   CREATININE 0.52 0.40*   LABGLOM >60 >60   GLUCOSE 131* 107*     PT/INR: No results for input(s): PROTIME, INR in the last 72 hours. IMAGING DATA:  CT ABDOMEN PELVIS W IV CONTRAST Additional Contrast? None   Final Result   Worsened diffuse colonic distension with stool and air-filled colon and wall   thickening. Suggestion of a segment of narrowing of the rectosigmoid colon   redemonstrated, similar or slightly improved from prior study. Chronic   colonic obstruction/stricture of versus Lisha syndrome are considerations. Correlate with direct visualization if indicated. Unchanged masslike partially calcified lesion in the pelvis may be compatible   with known history of malignancy or post treatment changes in this location. mild improved left inguinal lymphadenopathy, otherwise stable retroperitoneal   lymphadenopathy with some partially calcified lymph nodes. Worsened subcutaneous soft tissue edema most significant at the left lower   proximal thigh could be related to lymphedema. Stable osseous metastatic disease. XR CHEST PORTABLE   Final Result   Stable support line      Mild prominence of interstitial markings suggests mild vascular congestion             Primary Problem  Anemia    Active Hospital Problems    Diagnosis Date Noted    Iron deficiency [E61.1]      Priority: Medium    Constipation [K59.00]      Priority: Medium    Gynecologic cancer [facility]) [C57.9] 05/29/2021    Chronic deep vein thrombosis (DVT) of femoral vein of left lower extremity (HCC) [I82.512] 03/11/2021    Fatigue [R53.83]     Anemia [D64.9]     Seizure ([address] [R56.9] 10/21/2020         IMPRESSION:   1. Severe anemia related to chemotherapy/myelosuppression at the time of anemia of chronic disease/cancer  2. Recurrent metastatic ovarian cancer  3. Severe weakness fatigue due to above  4. Thrombocytosis likely reactive  5. Leukocytosis  6. Constipation/concern about bowel obstruction with abnormal CT of the abdomen      RECOMMENDATIONS:  1. I reviewed the laboratory data, imaging studies, discussed the diagnosis and possible treatment   2. Blood transfusion to keep hemoglobin above 7   3. Monitor H&H  4. Patient continues to be on anticoagulation  5. No plans for chemotherapy in house  Continue symptomatic supportive care  Discussed with patient and Nurse. Thank you for asking us to see this patient.             Adilson Feliz MD                 This note is created with the assistance of a speech recognition program.  While intending to generate a document that actually reflects the content of the visit, the document can still have some errors including those of syntax and sound a like substitutions which may escape proof reading. It such instances, actual meaning can be extrapolated by contextual diversion.

## 2022-06-07 LAB
ABSOLUTE EOS #: 0 K/UL (ref 0–0.4)
ABSOLUTE IMMATURE GRANULOCYTE: 0.18 K/UL (ref 0–0.3)
ABSOLUTE LYMPH #: 0.89 K/UL (ref 1–4.8)
ABSOLUTE MONO #: 1.42 K/UL (ref 0.1–0.8)
ANION GAP SERPL CALCULATED.3IONS-SCNC: 6 MMOL/L (ref 9–17)
BASOPHILS # BLD: 1 % (ref 0–2)
BASOPHILS ABSOLUTE: 0.18 K/UL (ref 0–0.2)
BUN BLDV-MCNC: 10 MG/DL (ref 6–20)
CALCIUM SERPL-MCNC: 7.6 MG/DL (ref 8.6–10.4)
CHLORIDE BLD-SCNC: 104 MMOL/L (ref 98–107)
CO2: 23 MMOL/L (ref 20–31)
CREAT SERPL-MCNC: 0.37 MG/DL (ref 0.5–0.9)
EOSINOPHILS RELATIVE PERCENT: 0 % (ref 1–4)
GFR AFRICAN AMERICAN: >60 ML/MIN
GFR NON-AFRICAN AMERICAN: >60 ML/MIN
GFR SERPL CREATININE-BSD FRML MDRD: ABNORMAL ML/MIN/{1.73_M2}
GLUCOSE BLD-MCNC: 128 MG/DL (ref 70–99)
HCT VFR BLD CALC: 27.8 % (ref 36.3–47.1)
HEMOGLOBIN: 8.4 G/DL (ref 11.9–15.1)
IMMATURE GRANULOCYTES: 1 %
LYMPHOCYTES # BLD: 5 % (ref 24–44)
MCH RBC QN AUTO: 29.5 PG (ref 25.2–33.5)
MCHC RBC AUTO-ENTMCNC: 30.2 G/DL (ref 28.4–34.8)
MCV RBC AUTO: 97.5 FL (ref 82.6–102.9)
MONOCYTES # BLD: 8 % (ref 1–7)
MORPHOLOGY: ABNORMAL
MORPHOLOGY: ABNORMAL
NRBC AUTOMATED: 0 PER 100 WBC
PDW BLD-RTO: 21 % (ref 11.8–14.4)
PLATELET # BLD: 534 K/UL (ref 138–453)
PMV BLD AUTO: 9.1 FL (ref 8.1–13.5)
POTASSIUM SERPL-SCNC: 3.9 MMOL/L (ref 3.7–5.3)
RBC # BLD: 2.85 M/UL (ref 3.95–5.11)
SEG NEUTROPHILS: 85 % (ref 36–66)
SEGMENTED NEUTROPHILS ABSOLUTE COUNT: 15.13 K/UL (ref 1.8–7.7)
SODIUM BLD-SCNC: 133 MMOL/L (ref 135–144)
WBC # BLD: 17.8 K/UL (ref 3.5–11.3)

## 2022-06-07 PROCEDURE — 6360000002 HC RX W HCPCS: Performed by: STUDENT IN AN ORGANIZED HEALTH CARE EDUCATION/TRAINING PROGRAM

## 2022-06-07 PROCEDURE — 99232 SBSQ HOSP IP/OBS MODERATE 35: CPT | Performed by: INTERNAL MEDICINE

## 2022-06-07 PROCEDURE — 6370000000 HC RX 637 (ALT 250 FOR IP): Performed by: STUDENT IN AN ORGANIZED HEALTH CARE EDUCATION/TRAINING PROGRAM

## 2022-06-07 PROCEDURE — 97535 SELF CARE MNGMENT TRAINING: CPT

## 2022-06-07 PROCEDURE — 80048 BASIC METABOLIC PNL TOTAL CA: CPT

## 2022-06-07 PROCEDURE — 97166 OT EVAL MOD COMPLEX 45 MIN: CPT

## 2022-06-07 PROCEDURE — 1200000000 HC SEMI PRIVATE

## 2022-06-07 PROCEDURE — 6360000002 HC RX W HCPCS

## 2022-06-07 PROCEDURE — 97116 GAIT TRAINING THERAPY: CPT

## 2022-06-07 PROCEDURE — 87040 BLOOD CULTURE FOR BACTERIA: CPT

## 2022-06-07 PROCEDURE — 6370000000 HC RX 637 (ALT 250 FOR IP)

## 2022-06-07 PROCEDURE — 85025 COMPLETE CBC W/AUTO DIFF WBC: CPT

## 2022-06-07 PROCEDURE — 97530 THERAPEUTIC ACTIVITIES: CPT

## 2022-06-07 PROCEDURE — 36415 COLL VENOUS BLD VENIPUNCTURE: CPT

## 2022-06-07 PROCEDURE — 2580000003 HC RX 258: Performed by: STUDENT IN AN ORGANIZED HEALTH CARE EDUCATION/TRAINING PROGRAM

## 2022-06-07 RX ORDER — FUROSEMIDE 10 MG/ML
20 INJECTION INTRAMUSCULAR; INTRAVENOUS ONCE
Status: COMPLETED | OUTPATIENT
Start: 2022-06-07 | End: 2022-06-07

## 2022-06-07 RX ORDER — LORAZEPAM 1 MG/1
1 TABLET ORAL EVERY 8 HOURS PRN
Status: DISCONTINUED | OUTPATIENT
Start: 2022-06-07 | End: 2022-06-13 | Stop reason: HOSPADM

## 2022-06-07 RX ORDER — MORPHINE SULFATE 15 MG/1
15 TABLET, FILM COATED, EXTENDED RELEASE ORAL 2 TIMES DAILY
Status: DISCONTINUED | OUTPATIENT
Start: 2022-06-07 | End: 2022-06-13 | Stop reason: HOSPADM

## 2022-06-07 RX ORDER — MORPHINE SULFATE 30 MG/1
30 TABLET, FILM COATED, EXTENDED RELEASE ORAL 2 TIMES DAILY
Status: DISCONTINUED | OUTPATIENT
Start: 2022-06-07 | End: 2022-06-13 | Stop reason: HOSPADM

## 2022-06-07 RX ORDER — FENTANYL CITRATE 50 UG/ML
25 INJECTION, SOLUTION INTRAMUSCULAR; INTRAVENOUS ONCE
Status: COMPLETED | OUTPATIENT
Start: 2022-06-07 | End: 2022-06-07

## 2022-06-07 RX ORDER — OXYCODONE HYDROCHLORIDE AND ACETAMINOPHEN 5; 325 MG/1; MG/1
1 TABLET ORAL EVERY 4 HOURS PRN
Status: DISCONTINUED | OUTPATIENT
Start: 2022-06-07 | End: 2022-06-13 | Stop reason: HOSPADM

## 2022-06-07 RX ADMIN — LAMOTRIGINE 125 MG: 100 TABLET ORAL at 20:59

## 2022-06-07 RX ADMIN — SODIUM CHLORIDE, PRESERVATIVE FREE 10 ML: 5 INJECTION INTRAVENOUS at 20:59

## 2022-06-07 RX ADMIN — APIXABAN 5 MG: 5 TABLET, FILM COATED ORAL at 20:59

## 2022-06-07 RX ADMIN — MORPHINE SULFATE 30 MG: 15 TABLET, FILM COATED, EXTENDED RELEASE ORAL at 20:57

## 2022-06-07 RX ADMIN — PANTOPRAZOLE SODIUM 40 MG: 40 TABLET, DELAYED RELEASE ORAL at 09:31

## 2022-06-07 RX ADMIN — OXYCODONE HYDROCHLORIDE AND ACETAMINOPHEN 1 TABLET: 5; 325 TABLET ORAL at 17:45

## 2022-06-07 RX ADMIN — SERTRALINE 100 MG: 50 TABLET, FILM COATED ORAL at 09:31

## 2022-06-07 RX ADMIN — LEVETIRACETAM 1000 MG: 500 TABLET, FILM COATED ORAL at 09:32

## 2022-06-07 RX ADMIN — LORAZEPAM 1 MG: 0.5 TABLET ORAL at 09:37

## 2022-06-07 RX ADMIN — QUETIAPINE FUMARATE 25 MG: 25 TABLET ORAL at 20:59

## 2022-06-07 RX ADMIN — MORPHINE SULFATE 30 MG: 30 TABLET, FILM COATED, EXTENDED RELEASE ORAL at 09:36

## 2022-06-07 RX ADMIN — MORPHINE SULFATE 15 MG: 15 TABLET, FILM COATED, EXTENDED RELEASE ORAL at 20:58

## 2022-06-07 RX ADMIN — ONDANSETRON 4 MG: 4 TABLET, ORALLY DISINTEGRATING ORAL at 17:03

## 2022-06-07 RX ADMIN — FENTANYL CITRATE 25 MCG: 50 INJECTION, SOLUTION INTRAMUSCULAR; INTRAVENOUS at 11:34

## 2022-06-07 RX ADMIN — OXYCODONE HYDROCHLORIDE AND ACETAMINOPHEN 1 TABLET: 5; 325 TABLET ORAL at 21:48

## 2022-06-07 RX ADMIN — DOCUSATE SODIUM 100 MG: 100 CAPSULE ORAL at 09:32

## 2022-06-07 RX ADMIN — SENNOSIDES 8.6 MG: 8.6 TABLET, COATED ORAL at 09:32

## 2022-06-07 RX ADMIN — LEVETIRACETAM 1000 MG: 500 TABLET, FILM COATED ORAL at 20:58

## 2022-06-07 RX ADMIN — FUROSEMIDE 20 MG: 10 INJECTION, SOLUTION INTRAMUSCULAR; INTRAVENOUS at 15:29

## 2022-06-07 RX ADMIN — OXYCODONE HYDROCHLORIDE AND ACETAMINOPHEN 1 TABLET: 5; 325 TABLET ORAL at 06:45

## 2022-06-07 RX ADMIN — LAMOTRIGINE 125 MG: 100 TABLET ORAL at 09:31

## 2022-06-07 RX ADMIN — SENNOSIDES 8.6 MG: 8.6 TABLET, COATED ORAL at 20:58

## 2022-06-07 RX ADMIN — APIXABAN 5 MG: 5 TABLET, FILM COATED ORAL at 09:31

## 2022-06-07 RX ADMIN — OXYCODONE HYDROCHLORIDE AND ACETAMINOPHEN 1 TABLET: 5; 325 TABLET ORAL at 12:51

## 2022-06-07 RX ADMIN — MORPHINE SULFATE 15 MG: 30 TABLET, FILM COATED, EXTENDED RELEASE ORAL at 09:35

## 2022-06-07 ASSESSMENT — PAIN SCALES - GENERAL
PAINLEVEL_OUTOF10: 9
PAINLEVEL_OUTOF10: 8
PAINLEVEL_OUTOF10: 9
PAINLEVEL_OUTOF10: 8
PAINLEVEL_OUTOF10: 8
PAINLEVEL_OUTOF10: 0
PAINLEVEL_OUTOF10: 8
PAINLEVEL_OUTOF10: 3
PAINLEVEL_OUTOF10: 3
PAINLEVEL_OUTOF10: 9

## 2022-06-07 ASSESSMENT — PAIN DESCRIPTION - ORIENTATION
ORIENTATION: MID
ORIENTATION: RIGHT;MID;LEFT
ORIENTATION: RIGHT;LEFT;MID
ORIENTATION: RIGHT;MID;LEFT

## 2022-06-07 ASSESSMENT — PAIN DESCRIPTION - PAIN TYPE: TYPE: CHRONIC PAIN

## 2022-06-07 ASSESSMENT — PAIN DESCRIPTION - DESCRIPTORS
DESCRIPTORS: ACHING
DESCRIPTORS: ACHING;CRAMPING
DESCRIPTORS: ACHING;DISCOMFORT
DESCRIPTORS: ACHING;DISCOMFORT
DESCRIPTORS: ACHING
DESCRIPTORS: ACHING;CRAMPING

## 2022-06-07 ASSESSMENT — PAIN DESCRIPTION - LOCATION
LOCATION: BACK;ABDOMEN
LOCATION: ABDOMEN;BACK

## 2022-06-07 ASSESSMENT — PAIN - FUNCTIONAL ASSESSMENT
PAIN_FUNCTIONAL_ASSESSMENT: ACTIVITIES ARE NOT PREVENTED
PAIN_FUNCTIONAL_ASSESSMENT: ACTIVITIES ARE NOT PREVENTED
PAIN_FUNCTIONAL_ASSESSMENT: PREVENTS OR INTERFERES SOME ACTIVE ACTIVITIES AND ADLS
PAIN_FUNCTIONAL_ASSESSMENT: ACTIVITIES ARE NOT PREVENTED
PAIN_FUNCTIONAL_ASSESSMENT: ACTIVITIES ARE NOT PREVENTED

## 2022-06-07 ASSESSMENT — PAIN DESCRIPTION - ONSET: ONSET: ON-GOING

## 2022-06-07 ASSESSMENT — PAIN DESCRIPTION - FREQUENCY: FREQUENCY: CONTINUOUS

## 2022-06-07 NOTE — PROGRESS NOTES
Patient called out for percocet which was discontinued. Patient tearful and states she cannot tolerated not having something for pain. Patient informed that son did not like her taking all those meds and she started crying stating it is not his decision. Patient called son and he denied talking to resident about the pain medications. Resident paged.

## 2022-06-07 NOTE — PROGRESS NOTES
[facility]  CDU / OBSERVATION ENCOUNTER  ATTENDING NOTE       This patient was transferred prior to my direct evaluation. I did discuss the case with the resident. Patient was requiring multiple transfusions and was felt appropriate for inpatient care.   Patient transferred prior to my direct evaluation    Poly Sharp MD  Attending Emergency  Physician

## 2022-06-07 NOTE — PROGRESS NOTES
Today's Date: 6/7/2022  Patient Name: Alexandra Perez  Date of admission: 6/3/2022  8:21 AM  Patient's age: 59 y.o., 1965  Admission Dx: Anemia [D64.9]  Anemia, unspecified type [D64.9]    Reason for Consult: management recommendations  Requesting Physician: No admitting provider for patient encounter. CHIEF COMPLAINT: Fatigue/patient on chemotherapy    History Obtained From:  Patient      Interval history:    Patient seen and examined  Labs and vitals reviewed  Patient had a bowel movement yesterday  Resting comfortably  Complains of abdominal bloating  Patient is on Eliquis  Denies any deonte bleeding      HISTORY OF PRESENT ILLNESS:      The patient is a 59 y.o.  female who is admitted to the hospital for severe fatigue she was supposed to get her chemotherapy for stage IV ovarian cancer today and reported difficulty ambulating no fever or chills abdominal pain nausea vomiting she has constipation and she was admitted for observation, hemoglobin at 6.3 with a platelet at 896 and WBC 13.9    Oncology history as below       DIAGNOSIS:       Recurrent ovarian cancer. Original diagnosis 2005 with multiple recurrences. Diffuse metastasis. CURRENT THERAPY:         Doxil/ Avastin started 9/18/2020. Avastin was discontinued due to recent GI bleeding. Status post recent vascular embolization  S/p seizure disorder. Gemzar started 2/26/2021. Interrupted due to hospitalization and problem with compliance. Evidence of disease progression on CT scan 2/22/22  Add Carboplatin to Gemzar March 2022        Past Medical History:   has a past medical history of Anemia, Bleeding, Cervical cancer ([address] Depression, Diabetes mellitus ([address] GERD (gastroesophageal reflux disease), Hx of blood clots, Hypertension, Metastatic cancer ([address] Ovarian cancer ([address] Post chemo evaluation, and Splenic lesion.     Past Surgical History:   has a past surgical history that includes Hysterectomy, total abdominal; Port Surgery; Tonsillectomy; IR PORT PLACEMENT > 5 YEARS (2020); Anus surgery; Abscess Drainage (); colectomy (2013); IR EMBOLIZATION HEMORRHAGE (10/05/2020); and Cardiac catheterization. Medications:    Reviewed in Epic     Allergies:  Ceftriaxone    Social History:   reports that she has been smoking cigarettes. She has a 20.00 pack-year smoking history. She has never used smokeless tobacco. She reports previous alcohol use. She reports that she does not use drugs. Family History: family history includes Alcohol Abuse in her mother; Cirrhosis in her mother. REVIEW OF SYSTEMS:    Constitutional: No fever or chills. No night sweats,+weight loss ,+ fatigue  Eyes: No eye discharge, double vision, or eye pain   HEENT: negative for sore mouth, sore throat, hoarseness and voice change   Respiratory: negative for cough , sputum, dyspnea, wheezing, hemoptysis, chest pain   Cardiovascular: negative for chest pain, dyspnea, palpitations, orthopnea, PND   Gastrointestinal: negative for nausea, vomiting, diarrhea, constipation, abdominal pain, Dysphagia, hematemesis and hematochezia   Genitourinary: negative for frequency, dysuria, nocturia, urinary incontinence, and hematuria   Integument: negative for rash, skin lesions, bruises.    Hematologic/Lymphatic: negative for easy bruising, bleeding, lymphadenopathy, or petechiae   Endocrine: negative for heat or cold intolerance,weight changes, change in bowel habits and hair loss   Musculoskeletal: negative for myalgias, arthralgias, pain, joint swelling,and bone pain   Neurological: negative for headaches, dizziness, seizures, weakness, numbness    PHYSICAL EXAM:      BP (!) 112/58   Pulse (!) 108   Temp 97.9 °F (36.6 °C) (Oral)   Resp 16   Wt 160 lb (72.6 kg)   SpO2 94%   BMI 26.63 kg/m²    Temp (24hrs), Av.9 °F (36.6 °C), Min:97.8 °F (36.6 °C), Max:97.9 °F (36.6 °C)    General appearance -ill looking2  Mental status - alert and cooperative   Eyes - pupils equal and reactive, extraocular eye movements intact   Ears - bilateral TM's and external ear canals normal   Mouth - mucous membranes moist, pharynx normal without lesions   Neck - supple, no significant adenopathy   Lymphatics - no palpable lymphadenopathy, no hepatosplenomegaly   Chest - clear to auscultation, no wheezes, rales or rhonchi, symmetric air entry   Heart - normal rate, regular rhythm, normal S1, S2, no murmurs  Abdomen - soft, nontender, nondistended, no masses or organomegaly   Neurological - alert, oriented, normal speech, no focal findings or movement disorder noted   Musculoskeletal - no joint tenderness, deformity or swelling   Extremities - peripheral pulses normal, no pedal edema, no clubbing or cyanosis   Skin - normal coloration and turgor, no rashes, no suspicious skin lesions noted ,    DATA:    Labs:   CBC:   Recent Labs     06/05/22  0626 06/05/22  0626 06/05/22  1523 06/06/22  0812   WBC 12.1*  --   --  14.8*   HGB 6.8*   < > 8.6* 8.8*   HCT 23.6*   < > 27.8* 29.7*   *  --   --  536*    < > = values in this interval not displayed. BMP:   Recent Labs     06/05/22  0850      K 3.5*   CO2 22   BUN 12   CREATININE 0.40*   LABGLOM >60   GLUCOSE 107*     PT/INR: No results for input(s): PROTIME, INR in the last 72 hours. IMAGING DATA:  CT ABDOMEN PELVIS W IV CONTRAST Additional Contrast? None   Final Result   Worsened diffuse colonic distension with stool and air-filled colon and wall   thickening. Suggestion of a segment of narrowing of the rectosigmoid colon   redemonstrated, similar or slightly improved from prior study. Chronic   colonic obstruction/stricture of versus [place] syndrome are considerations. Correlate with direct visualization if indicated. Unchanged masslike partially calcified lesion in the pelvis may be compatible   with known history of malignancy or post treatment changes in this location.       mild improved left inguinal lymphadenopathy, otherwise stable retroperitoneal   lymphadenopathy with some partially calcified lymph nodes. Worsened subcutaneous soft tissue edema most significant at the left lower   proximal thigh could be related to lymphedema. Stable osseous metastatic disease. XR CHEST PORTABLE   Final Result   Stable support line      Mild prominence of interstitial markings suggests mild vascular congestion             Primary Problem  Anemia    Active Hospital Problems    Diagnosis Date Noted    Iron deficiency [E61.1]      Priority: Medium    Constipation [K59.00]      Priority: Medium    Gynecologic cancer [facility]) [C57.9] 05/29/2021    Chronic deep vein thrombosis (DVT) of femoral vein of left lower extremity (HCC) [I82.512] 03/11/2021    Fatigue [R53.83]     Anemia [D64.9]     Seizure ([address] [R56.9] 10/21/2020         IMPRESSION:   1. Severe anemia related to chemotherapy/myelosuppression at the time of anemia of chronic disease/cancer  2. Recurrent metastatic ovarian cancer  3. Severe weakness fatigue due to above  4. Thrombocytosis likely reactive  5. Leukocytosis  6. Constipation/concern about bowel obstruction with abnormal CT of the abdomen      RECOMMENDATIONS:  1. I reviewed the laboratory data, imaging studies, discussed the diagnosis and possible treatment   2. Blood transfusion to keep hemoglobin above 7  3. Patient on Eliquis. Ultrasound from 5/2022 shows proximal femoral DVT  4. Stool occult blood positive earlier  5.  slightly improved  6. No plans for chemotherapy while in-house  7. Complete course of IV iron  8. Continue symptomatic supportive care    Discussed with patient and Nurse. Thank you for asking us to see this patient.             Yonatan Pressley MD                 This note is created with the assistance of a speech recognition program.  While intending to generate a document that actually reflects the content of the visit, the document can still have some errors including those of syntax and sound a like substitutions which may escape proof reading. It such instances, actual meaning can be extrapolated by contextual diversion.

## 2022-06-07 NOTE — PROGRESS NOTES
Occupational Therapy  Facility/Department: [facility]  Occupational Therapy Initial Assessment    Name: Carmen Raymundo  : 1965  MRN: [id number]  Date of Service: 2022    Discharge Recommendations:  Patient would benefit from continued therapy after discharge  OT Equipment Recommendations  Equipment Needed: Yes  Mobility Devices: ADL Assistive Devices  ADL Assistive Devices: Toileting - Raised Toilet Seat with arms;Long-handled Sponge;Reacher     Copied from Internal Medicine:  Carmen Raymundo is a 59 y.o. female who presents with fatigue, stage IV ovarian cancer, actively on chemotherapy. Notes she missed her chemotherapy  yesterday morning due to generalized weakness. Did note some constipation over the past 4 days.     Hemoglobin was 6.3 and was given 1 unit PRBC in ED. Patient was noted to be tachycardic at that time. CT abdomen pelvis showed colonic distention consistent with chronic ileus versus chronic obstruction. Patient placed in observation unit for GI consult, repeat CBC.     Patient notes generalized abdominal pain that is her usual pain this morning. Is tolerating p.o. No chest pain, vomiting. No lightheadedness. Patient Diagnosis(es): The encounter diagnosis was Anemia, unspecified type. Past Medical History:  has a past medical history of Anemia, Bleeding, Cervical cancer ([address] Depression, Diabetes mellitus ([address] GERD (gastroesophageal reflux disease), Hx of blood clots, Hypertension, Metastatic cancer ([address] Ovarian cancer ([address] Post chemo evaluation, and Splenic lesion. Past Surgical History:  has a past surgical history that includes Hysterectomy, total abdominal; Port Surgery; Tonsillectomy; IR PORT PLACEMENT > 5 YEARS (2020); Anus surgery; Abscess Drainage (); colectomy (2013); IR EMBOLIZATION HEMORRHAGE (10/05/2020); and Cardiac catheterization. Treatment Diagnosis: Anemia    Assessment    Pt lying supine in bed upon entrance to room.  Pt completed bed mobility with stand by assistance. Pt sat at eob 10 minutes with fair unsupported sitting balance using BUE's for support. Pt with poor activity tolerance due to increased abdominal pain, pt laid back down supine in bed. Pt requesting writer to ask RN to call dr for increased pain meds. informed RN who went into pts room and talk with pt. Sit to stand with min assistance. Please refer to below assist levels for adl completion. Pt ed on OT POC, safety awareness tech, proper hand placement for transfers, and energy conservation tech with proper breathing tech with fair return. Pt tends to hold breath with activity due to abd pain. Pt ed on pursed lip breathing tech with fair return. Pt retired supine in bed with call light and phone in reach. All needs met upon exit. Performance deficits / Impairments: Decreased functional mobility ; Decreased safe awareness;Decreased balance;Decreased ADL status; Decreased endurance;Decreased high-level IADLs  Treatment Diagnosis: Anemia  Prognosis: Fair  Decision Making: Medium Complexity  REQUIRES OT FOLLOW-UP: Yes  Activity Tolerance  Activity Tolerance: Patient limited by pain        Plan   Plan  Times per Week: 3-4 x week     Restrictions  Restrictions/Precautions  Restrictions/Precautions: Seizure,Fall Risk,Up as Tolerated,Bed Alarm  Required Braces or Orthoses?: No  Position Activity Restriction  Other position/activity restrictions: History of seizure    Subjective   General  Patient assessed for rehabilitation services?: Yes  Family / Caregiver Present: No  Pain: Pt complaint of 9/10 abdominal pain with minimal relief after pain medication; non pharmaceutical interventions include distraction, increased activity, therapeutic presence     Social/Functional History  Social/Functional History  Lives With: Son (pts son also has cancer and assist pt as able, mom is primary care giver for son-currently son able to bathe and dress himself)  Type of Home: [facility] Layout: Two level,Able to Live on Main level with bedroom/bathroom,Laundry in basement,1/2 bath on main level  Home Access: Stairs to enter with rails  Entrance Stairs - Number of Steps: 5 steps to enter no railing  Entrance Stairs - Rails: None  Bathroom Shower/Tub: Tub/Shower unit,Curtain,Shower chair with back  Bathroom Toilet: Standard  Bathroom Equipment: Grab bars in shower,Hand-held shower,Shower chair  Bathroom Accessibility: Wheelchair accessible  Home Equipment: Walker, rolling,Wheelchair-manual,Grab bars  Has the patient had two or more falls in the past year or any fall with injury in the past year?: No  Receives Help From: Family (pts dtr resides 15 min from pt, visits daily to assist as needed. Pts ex  also very supportive)  ADL Assistance: Needs assistance  Bath: Minimal assistance  Dressing: Minimal assistance  Grooming: Minimal assistance  Feeding: Independent  Toileting: Independent  Homemaking Assistance: Needs assistance  Homemaking Responsibilities: Yes  Dependent Care Responsibility: Secondary (care of dog and cat shared with household)  Ambulation Assistance: Independent  Transfer Assistance: Independent  Active : No  Patient's  Info: daughter or ex spouse drives  Mode of Transportation: Car  Occupation: On disability  Type of Occupation:  for a [address]: Pt enjoys completing CoPatient, tonio, crosstitch  Additional Comments: Pt report prior to admission she required assist with care and household tasks       Objective   O2 Device: None (Room air)  Vision Exceptions: Wears glasses at all times (glasses present in hospital)  Hearing: Within functional limits       Safety Devices  Type of Devices: Bed alarm in place; Left in bed;Nurse notified;Gait belt  Restraints  Restraints Initially in Place: No  Bed Mobility Training  Bed Mobility Training: Yes  Overall Level of Assistance: Stand-by assistance  Rolling: Stand-by assistance  Supine to Sit: Stand-by assistance  Sit to Supine: Stand-by assistance  Scooting: Stand-by assistance  Balance  Sitting: With support (pt using BUE support for sitting balance due to increased abdominal pain)  Standing: With support (min a for ~ 2 min before needing to lie supine in bed due to increased abdominal pain)     AROM: Within functional limits  PROM: Within functional limits  Strength: Within functional limits (Overall muscle strength 4-/5 BUE's)  Coordination: Within functional limits  Tone: Normal  Sensation: Intact (deneis numbness/tingling B hands)  ADL  Feeding: Independent;Setup  Grooming: Independent;Setup  UE Bathing: Minimal assistance;Setup (assist for back)  LE Bathing: Moderate assistance;Setup (assist for below mid shins [place] due to decreased balance with functional reach)  UE Dressing: Minimal assistance (pt able to thread BUE's in and out of hosp gown, assist to tie gown in back)  LE Dressing: Moderate assistance; Increased time to complete;Setup (pt able to assist with donning R footie with figure four tech, unable to assist with LLE due to LLE edema)  Toileting: Minimal assistance (pt with pure wick and brief)     Bed mobility  Rolling to Left: Stand by assistance  Supine to Sit: Stand by assistance  Sit to Supine: Stand by assistance  Bed Mobility Comments: Pt head of bed elevated, pt requiring extra time and effort to complete task due to increased abdominal pain and edematous LLE  Transfers  Sit to stand: Minimal assistance  Stand to sit: Contact guard assistance  Transfer Comments: pt with decreased activity tolerance due to increased abd pain and LLE edema  Vision - Basic Assessment  Patient Visual Report: No visual complaint reported. Cognition  Overall Cognitive Status: Exceptions  Arousal/Alertness: Delayed responses to stimuli  Following Commands:  Follows multistep commands with increased time  Attention Span: Attends with cues to redirect  Memory: Appears intact  Safety Judgement: Decreased awareness of need for safety  Insights: Decreased awareness of deficits  Initiation: Requires cues for some  Sequencing: Requires cues for some     LUE AROM (degrees)  LUE AROM : WFL  Left Hand PROM (degrees)  Left Hand PROM: WFL  Left Hand AROM (degrees)  Left Hand AROM: WFL  RUE PROM (degrees)  RUE PROM: WFL  RUE AROM (degrees)  RUE AROM : WFL  Right Hand PROM (degrees)  Right Hand PROM: WFL  Right Hand AROM (degrees)  Right Hand AROM: WFL        Hand Dominance  Hand Dominance: Left     AM-PAC Score  AM-PAC Inpatient Daily Activity Raw Score: 18 (06/07/22 1101)  AM-PAC Inpatient ADL T-Scale Score : 38.66 (06/07/22 1101)  ADL Inpatient CMS 0-100% Score: 46.65 (06/07/22 1101)  ADL Inpatient CMS G-Code Modifier : CK (06/07/22 1101)    Goals  Short Term Goals  Time Frame for Short term goals: Pt will, by discharge:  Short Term Goal 1: Dem cga for adl performance  Short Term Goal 2: Dem I with functional transfers for daily occupations  Short Term Goal 3: Dem sba for dynamic mobility with LRD for adl completion  Short Term Goal 4: Dem good safety awareness tech for all transfers/mobility  Short Term Goal 5: Dem a 10 min static stand for hygiene purposes to increase activity tolerance       Therapy Time   Individual Concurrent Group Co-treatment   Time In 0930         Time Out 1017         Minutes 47         Timed Code Treatment Minutes: [address], OTR/L

## 2022-06-07 NOTE — CARE COORDINATION
Met with patient to discuss transitional planning. Patient is still reviewing SNF list. Requested she review list and provide 3 choices this afternoon.

## 2022-06-07 NOTE — PROGRESS NOTES
Physical Therapy  Facility/Department: [facility]  Daily treatment note    Name: Joe Headley  : 1965  MRN: [id number]  Date of Service: 2022    Discharge Recommendations:  Patient would benefit from continued therapy after discharge          Patient Diagnosis(es): The encounter diagnosis was Anemia, unspecified type. Past Medical History:  has a past medical history of Anemia, Bleeding, Cervical cancer ([facility] [address] Depression, Diabetes mellitus ([facility] GERD (gastroesophageal reflux disease), Hx of blood clots, Hypertension, Metastatic cancer ([facility] Ovarian cancer ([facility] Post chemo evaluation, and Splenic lesion. Past Surgical History:  has a past surgical history that includes Hysterectomy, total abdominal; Port Surgery; Tonsillectomy; IR PORT PLACEMENT > 5 YEARS (2020); Anus surgery; Abscess Drainage (); colectomy (2013); IR EMBOLIZATION HEMORRHAGE (10/05/2020); and Cardiac catheterization. Assessment   Body Structures, Functions, Activity Limitations Requiring Skilled Therapeutic Intervention: Decreased functional mobility ; Decreased ROM; Decreased body mechanics; Decreased endurance;Decreased balance  Assessment: Pt able to amb 50ft x2 with RW MIN -CGa for safet . presentingh with weakness and mobility defcits ; Pt  will benefit from furhter PT to progress activity and promote improved fucntionl abilty needed to retur home at highest level of mobilty  Therapy Prognosis: Guarded  Requires PT Follow-Up: Yes  Activity Tolerance  Activity Tolerance: Patient limited by fatigue;Patient limited by pain; Patient limited by endurance     Plan   Plan  Plan: 5-7 times per week  Specific Instructions for Next Treatment: progress activity and progress gait  Current Treatment Recommendations: Strengthening,ROM,Balance training,Functional mobility training,Transfer training,Endurance training,Stair training,Neuromuscular re-education,Home exercise program  Safety Devices  Type of Devices: Bed alarm in place,Left in bed,Nurse notified,Gait belt  Restraints  Restraints Initially in Place: No     Restrictions  Restrictions/Precautions  Restrictions/Precautions: Seizure,Fall Risk,Up as Tolerated,Bed Alarm  Required Braces or Orthoses?: No  Position Activity Restriction  Other position/activity restrictions: History of seizure     Subjective   Pain: Pt complaint of 9/10 abdominal pain with minimal relief after pain medication; non pharmaceutical interventions include distraction, increased activity, therapeutic presence  General  Chart Reviewed: Yes  Patient assessed for rehabilitation services?: Yes  Additional Pertinent Hx: The patient is a pleasant 59 y.o. female  with significant past medical history of stage IV ovarian cancer currently on chemotherapy presents with fatigue and generalized weakness  Response To Previous Treatment: Patient with no complaints from previous session. Family / Caregiver Present: No  Follows Commands: Within Functional Limits  Other (Comment): Pt awake and alert in agreement with PT intervention  Subjective  Subjective: Pt complain of 8/10 abdominal pain, weakness and LE edema impacting abilty to ambulate and safetly transfer without assist, requires some encouragement for participation with good return.          Social/Functional History  Social/Functional History  Lives With: Son (pts son also has cancer and assist pt as able, mom is primary care giver for son-currently son able to bathe and dress himself)  Type of Home: House  Home Layout: Two level,Able to Live on Main level with bedroom/bathroom,Laundry in basement,1/2 bath on main level  Home Access: Stairs to enter with rails  Entrance Stairs - Number of Steps: 5 steps to enter no railing  Entrance Stairs - Rails: None  Bathroom Shower/Tub: Tub/Shower unit,Curtain,Shower chair with back  Bathroom Toilet: Standard  Bathroom Equipment: Grab bars in shower,Hand-held shower,Shower chair  Bathroom Accessibility: Wheelchair accessible  Home Equipment: Ronnald Real, rolling,Wheelchair-manual,Grab bars  Has the patient had two or more falls in the past year or any fall with injury in the past year?: No  Receives Help From: Family (pts dtr resides 15 min from pt, visits daily to assist as needed. Pts ex  also very supportive)  ADL Assistance: Needs assistance  Bath: Minimal assistance  Dressing: Minimal assistance  Grooming: Minimal assistance  Feeding: Independent  Toileting: Independent  Homemaking Assistance: Needs assistance  Homemaking Responsibilities: Yes  Dependent Care Responsibility: Secondary (care of dog and cat shared with household)  Ambulation Assistance: Independent  Transfer Assistance: Independent  Active : No  Patient's  Info: daughter or ex spouse drives  Mode of Transportation: Car  Occupation: On disability  Type of Occupation: [employer] for a [address]: Pt enjoys completing crafts, tonio, crosstitch  Additional Comments: Pt report prior to admission she required assist with care and household tasks  Vision/Hearing       Cognition   Orientation  Overall Orientation Status: Within Functional Limits  Cognition  Overall Cognitive Status: WFL  Arousal/Alertness: Delayed responses to stimuli  Following Commands:  Follows multistep commands with increased time  Attention Span: Attends with cues to redirect  Memory: Appears intact  Safety Judgement: Decreased awareness of need for safety  Insights: Decreased awareness of deficits  Initiation: Requires cues for some  Sequencing: Requires cues for some     Objective   Heart Rate: (!) 121  Heart Rate Source: Monitor  BP: (!) 139/71  BP Location: Left upper arm  BP Method: Automatic  Resp: 18  SpO2: 96 %  O2 Device: None (Room air)  Bed Mobility Training  Bed Mobility Training: Yes  Overall Level of Assistance: Stand-by assistance  Rolling: Stand-by assistance  Supine to Sit: Stand-by assistance  Sit to Supine: Stand-by assistance  Scooting: Stand-by assistance  Balance  Sitting: With support (pt using BUE support for sitting balance due to increased abdominal pain)  Standing: With support (min a for ~ 2 min before needing to lie supine in bed due to increased abdominal pain)  Bed mobility  Rolling to Left: Stand by assistance  Rolling to Right: Stand by assistance  Supine to Sit: Minimal assistance  Sit to Supine: Contact guard assistance  Bed Mobility Comments: Pt head of bed elevated, pt requiring extra time and effort to complete task due to increased abdominal pain and edematous LLE  Transfers  Sit to Stand: Minimal Assistance  Stand to sit: Minimal Assistance  Bed to Chair: Contact guard assistance (Commode)  Comment: Transfer with Rw, unsteady with no LOB noted. Increase time to gain bearings. Ambulation  WB Status: FWB  Ambulation  Surface: level tile  Assistance: Contact guard assistance  Quality of Gait: Unsteady , increase reliance on RW. MIN A for making turn . Cueing for safety and sequencing  Gait Deviations: Slow Cynthia;Staggers;Decreased step length;Decreased step height; Increased TAWANDA  Distance: 50ft x2  Comments: Rest x4 . significant SOB noted , limited by pt c/o feeling of pounding heart. HR post amb 121BPM     Balance  Posture: Good  Sitting - Static: Good  Sitting - Dynamic: Good  Standing - Static: Fair  Standing - Dynamic: Fair;-  Exercise Treatment: Defer d/t need to use commode . Pt transfer to commode , call light within reach.    AM-PAC Score     AM-PAC Inpatient Mobility without Stair Climbing Raw Score : 15 (06/07/22 1447)  AM-PAC Inpatient without Stair Climbing T-Scale Score : 43.03 (06/07/22 1447)  Mobility Inpatient CMS 0-100% Score: 47.43 (06/07/22 1447)  Mobility Inpatient without Stair CMS G-Code Modifier : CK (06/07/22 1447)       Goals  Short Term Goals  Time Frame for Short term goals: 5 visits  Short term goal 1: Pt to ambualte 25 ft SBA with RW  Short term goal 2: Pt to complete functional transfers mod I with rolling walker  Long Term Goals  Time Frame for Long term goals : 14 visits  Long term goal 1: Pt to ambulate 100 ft with RW mod I  Long term goal 2: Pt to ascend /descend 5 steps mod I  Patient Goals   Patient goals : to walk and gain strength       Education  Patient Education  Education Given To: Patient  Education Provided: Role of Therapy;Plan of Care;Precautions;Transfer Training;Energy Conservation;Equipment; Fall Prevention Strategies  Education Provided Comments: Pt educated in use of rolling walker, safety with transfers and LE ROM/positioning  Education Method: Demonstration;Verbal  Barriers to Learning: None  Education Outcome: Verbalized understanding      Therapy Time   Individual Concurrent Group Co-treatment   Time In 1357         Time Out 1427         Minutes 30         Timed Code Treatment Minutes: [address] [place] [address], [facility]

## 2022-06-07 NOTE — PROGRESS NOTES
[facility]  Internal Medicine Teaching Residency Program  Inpatient Daily Progress Note  ______________________________________________________________________________    Patient: Marnie Runner [facility]  YOB: 1965   VXU:[id number]    Acct: [de-identified]     Room: 78/5325-  Admit date: 6/3/2022  Today's date: 06/07/22  Number of days in the hospital: 2    SUBJECTIVE   Admitting Diagnosis: Anemia  CC: . Chief Complaint   Patient presents with    Fatigue       Pt examined at bedside. Chart & results reviewed. No acute events overnight. Patient states she has stomach and back pain with no improvement. Patient is afebrile and is hemodynamically stable /58 and pulse 108. She is saturating 94% on 1 L nasal cannula. She received IV Lasix 20 yesterday. No labs from today. Hemoglobin up to 8.8 status post 2 units PRBC. Heme-onc on board recommend transfusion to keep hemoglobin above 7. STOOL OCCULT BLOOD POSITIVE   slightly improved, no plans for chemotherapy while in house. Continue IV iron course and symptomatic supportive care. ROS:  Constitutional:  negative for chills, fevers, sweats  Respiratory:  negative for cough, dyspnea on exertion, hemoptysis, shortness of breath, wheezing  Cardiovascular:  negative for chest pain, chest pressure/discomfort, lower extremity edema, palpitations  Gastrointestinal:  positive for abdominal pain. Negative for constipation, diarrhea, nausea, vomiting  Neurological:  negative for dizziness, headache  BRIEF HISTORY     The patient is a pleasant 59 y.o. female  with significant past medical history of stage IV ovarian cancer currently on chemotherapy presents with fatigue and generalized weakness. Patient also had constipation for over past 4 days since admission.   She denied nausea, vomiting but did complain of mild abdominal pain in the periumbilical region of 6/88 intensity for a few months.     Patient states that she has missed her chemotherapy on 6/3/2022 due to generalized weakness. On arrival to the ED hemoglobin was checked and it was 6.3 and she received 1 unit PRBC. She was noted to be tachycardic at the time. CT abdomen pelvis showed colonic distention consistent with chronic ileus versus chronic obstruction. Patient was initially placed in observation unit for GI consult and now has been admitted to internal medicine service for further management.       GI suggested that the chronic sigmoid stricture is not traversable by colonoscopy and advised to continue on stool softeners with intermittent use of MiraLAX. GI signed off.     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     On evaluation, patient denied headache, cough, chest pain, numbness, weakness, shortness of breath, abdominal pain or urinary symptoms. Patient  is afebrile and hemodynamically stable with /65, heart rate 107. She is saturating well on 2 L nasal cannula.     Initial hemoglobin was 6.3 after which she received 1 unit PRBC and it trended up to 8.1. Hemoglobin trended down to 7.3 and 6.8 later with another unit of PRBC transfusion. Hb now trended up to 8.8. Stool occult positive from 2022. OBJECTIVE     Vital Signs:  BP (!) 112/58   Pulse (!) 108   Temp 97.9 °F (36.6 °C) (Oral)   Resp 16   Wt 160 lb (72.6 kg)   SpO2 94%   BMI 26.63 kg/m²     Temp (24hrs), Av.9 °F (36.6 °C), Min:97.8 °F (36.6 °C), Max:97.9 °F (36.6 °C)    In: -   Out: 650 [Urine:650]    Physical Exam:  Constitutional: This is a well developed, well nourished, 25-29.9 - Overweight 59y.o. year old female who is alert, oriented, cooperative and in no apparent distress. Head:normocephalic and atraumatic. EENT:  PERRLA.   No conjunctival injections. Septum was midline, mucosa was without erythema, exudates or cobblestoning. No thrush was noted. Neck: Supple without thyromegaly. No elevated JVP. Trachea was midline. Respiratory: Chest was symmetrical without dullness to percussion. Breath sounds bilaterally were clear to auscultation. There were no wheezes, rhonchi or rales. There is no intercostal retraction or use of accessory muscles. No egophony noted. Cardiovascular: Regular without murmur, clicks, gallops or rubs. Abdomen: Slightly rounded and soft without organomegaly. No rebound, rigidity or guarding was appreciated. Lymphatic: No lymphadenopathy. Musculoskeletal: Normal curvature of the spine. No gross muscle weakness. Extremities: Bilateral lower extremity pitting edema LLE> RLE - no much improvement. No involuntary movements are noted. Skin:  Warm and dry. Good color, turgor and pigmentation. No lesions or scars.   No cyanosis or clubbing  Neurological/Psychiatric: The patient's general behavior, level of consciousness, thought content and emotional status is normal.        Medications:  Scheduled Medications:    apixaban  5 mg Oral BID    hydrocortisone   Rectal BID    lamoTRIgine  125 mg Oral BID    levETIRAcetam  1,000 mg Oral BID    morphine  15 mg Oral BID    morphine  30 mg Oral BID    pantoprazole  40 mg Oral Daily    QUEtiapine  25 mg Oral Nightly    QUEtiapine  50 mg Oral Nightly    senna  1 tablet Oral BID    sertraline  100 mg Oral Daily    docusate sodium  100 mg Oral Daily    sodium chloride flush  5-40 mL IntraVENous 2 times per day     Continuous Infusions:    sodium chloride      sodium chloride      sodium chloride Stopped (06/05/22 8084)     PRN Medicationssodium chloride, , PRN  bisacodyl, 10 mg, Daily PRN  bisacodyl, 10 mg, TID PRN  sodium chloride, , PRN  benzonatate, 100 mg, TID PRN  LORazepam, 1 mg, Q8H PRN  melatonin, 5 mg, Nightly PRN  ondansetron, 4 mg, Q8H PRN  oxyCODONE-acetaminophen, 1 tablet, Q4H PRN  sodium chloride flush, 5-40 mL, PRN  sodium chloride, , PRN  acetaminophen, 650 mg, Q4H PRN  ondansetron, 4 mg, Q8H PRN   Or  ondansetron, 4 mg, Q6H PRN        Diagnostic Labs:  CBC:   Recent Labs     06/05/22  0626 06/05/22  1523 06/06/22  0812   WBC 12.1*  --  14.8*   RBC 2.48*  --  3.12*   HGB 6.8* 8.6* 8.8*   HCT 23.6* 27.8* 29.7*   MCV 95.2  --  95.2   RDW 19.7*  --  19.6*   *  --  536*     BMP:   Recent Labs     06/05/22  0850      K 3.5*      CO2 22   BUN 12   CREATININE 0.40*     BNP: No results for input(s): BNP in the last 72 hours. PT/INR: No results for input(s): PROTIME, INR in the last 72 hours. APTT: No results for input(s): APTT in the last 72 hours. CARDIAC ENZYMES: No results for input(s): CKMB, CKMBINDEX, TROPONINI in the last 72 hours. Invalid input(s): CKTOTAL;3  FASTING LIPID PANEL:  Lab Results   Component Value Date    CHOL 131 03/10/2021    HDL 33 (L) 03/10/2021    TRIG 147 03/10/2021     LIVER PROFILE:   No results for input(s): AST, ALT, ALB, BILIDIR, BILITOT, ALKPHOS in the last 72 hours. MICROBIOLOGY:   Lab Results   Component Value Date/Time    CULTURE NO GROWTH 5 DAYS 02/20/2022 11:20 AM       Imaging:    CT ABDOMEN PELVIS W IV CONTRAST Additional Contrast? None    Result Date: 6/3/2022  Worsened diffuse colonic distension with stool and air-filled colon and wall thickening. Suggestion of a segment of narrowing of the rectosigmoid colon redemonstrated, similar or slightly improved from prior study. Chronic colonic obstruction/stricture of versus [place] syndrome are considerations. Correlate with direct visualization if indicated. Unchanged masslike partially calcified lesion in the pelvis may be compatible with known history of malignancy or post treatment changes in this location.  mild improved left inguinal lymphadenopathy, otherwise stable retroperitoneal lymphadenopathy with some partially calcified lymph nodes. Worsened subcutaneous soft tissue edema most significant at the left lower proximal thigh could be related to lymphedema. Stable osseous metastatic disease. XR CHEST PORTABLE    Result Date: 6/3/2022  Stable support line Mild prominence of interstitial markings suggests mild vascular congestion       ASSESSMENT & PLAN     ASSESSMENT / PLAN:     Principal Problem:    Anemia  Active Problems:    Iron deficiency    Constipation    Seizure (HCC)    Fatigue    Chronic deep vein thrombosis (DVT) of femoral vein of left lower extremity (HCC)    Gynecologic cancer (HCC)  Resolved Problems:    * No resolved hospital problems. *      1. Severe anemia related to chemotherapy/myelosuppression at the time of anemia of chronic disease/cancer   - Fatigue and weakness secondary to anemia.  - Initial hemoglobin was 6.3 after which she received 1 unit PRBC on 6/3/2022 and it trended up to 8.1. Hemoglobin trended down to 7.3 and 6.8 on 6/5/2022 later with another unit of PRBC transfusion.  -  Stool occult blood positive. - Monitor H and H.   - Transfuse if Hb < 7.     2. Iron deficiency   - Iron level 8 on 6/4/2022. Normal MCV (in contrast to low MCV) and elevated RDW. - Placed on VENOFER  mg every 24 hours for 2 doses.     3. Constipation   -  CT abdomen pelvis showed colonic distention consistent with chronic ileus versus chronic obstruction. - GI consulted who suggested that patient has a  chronic sigmoid stricture not traversable by colonoscopy and advised to continue on stool softeners with intermittent use of MiraLAX.     4. History of seizure disorder   - On KEPPRA 1 gm oral BID and LAMICTAL 125 mg oral BID.     5. Chronic DVT of left femoral vein  - On ELIQUIS 5 mgm oral BID.     6. Stage IV metastatic ovarian cancer   - Hem onc on board. Follow up recommendations. - slightly improved, no plans for chemotherapy while in-house. - On ELIQUIS for anticoagulation.      7.  Lower extremity pitting edema LLE> RLE  -Likely secondary to stage IV metastatic ovarian cancer/previous DVT of left femoral vein. -Echo from's 2/22/2022 shows LVEF of 55%. -IV Lasix 20 mg 1 dose given yesterday. No much improvement.        DVT ppx: On ELIQUIS 5 mg oral BID  GI ppx: Protonix 40 mg oral daily     PT/OT/SW: Consulted  Discharge Planning:  to assist with    Rafi Mchugh MD  Internal Medicine Resident, PGY-1  [address], [place]  6/7/2022, 7:50 AM

## 2022-06-07 NOTE — PROGRESS NOTES
Rn spoke with patient regarding medications. She stated, it is her decision not her kids. She does not want her son to be in charge or making any decisions for her at this time.

## 2022-06-08 LAB
ABSOLUTE EOS #: 0.14 K/UL (ref 0–0.4)
ABSOLUTE IMMATURE GRANULOCYTE: 0.14 K/UL (ref 0–0.3)
ABSOLUTE LYMPH #: 2.09 K/UL (ref 1–4.8)
ABSOLUTE MONO #: 1.25 K/UL (ref 0.1–0.8)
ANION GAP SERPL CALCULATED.3IONS-SCNC: 10 MMOL/L (ref 9–17)
BASOPHILS # BLD: 0 % (ref 0–2)
BASOPHILS ABSOLUTE: 0 K/UL (ref 0–0.2)
BUN BLDV-MCNC: 11 MG/DL (ref 6–20)
CALCIUM SERPL-MCNC: 7.6 MG/DL (ref 8.6–10.4)
CHLORIDE BLD-SCNC: 105 MMOL/L (ref 98–107)
CO2: 23 MMOL/L (ref 20–31)
CREAT SERPL-MCNC: 0.31 MG/DL (ref 0.5–0.9)
EOSINOPHILS RELATIVE PERCENT: 1 % (ref 1–4)
GFR AFRICAN AMERICAN: >60 ML/MIN
GFR NON-AFRICAN AMERICAN: >60 ML/MIN
GFR SERPL CREATININE-BSD FRML MDRD: ABNORMAL ML/MIN/{1.73_M2}
GLUCOSE BLD-MCNC: 88 MG/DL (ref 70–99)
HCT VFR BLD CALC: 27.2 % (ref 36.3–47.1)
HEMOGLOBIN: 8.2 G/DL (ref 11.9–15.1)
IMMATURE GRANULOCYTES: 1 %
LYMPHOCYTES # BLD: 15 % (ref 24–44)
MCH RBC QN AUTO: 29.4 PG (ref 25.2–33.5)
MCHC RBC AUTO-ENTMCNC: 30.1 G/DL (ref 28.4–34.8)
MCV RBC AUTO: 97.5 FL (ref 82.6–102.9)
MONOCYTES # BLD: 9 % (ref 1–7)
MORPHOLOGY: ABNORMAL
MORPHOLOGY: ABNORMAL
NRBC AUTOMATED: 0 PER 100 WBC
PDW BLD-RTO: 22 % (ref 11.8–14.4)
PLATELET # BLD: 504 K/UL (ref 138–453)
PMV BLD AUTO: 8.8 FL (ref 8.1–13.5)
POTASSIUM SERPL-SCNC: 3.6 MMOL/L (ref 3.7–5.3)
RBC # BLD: 2.79 M/UL (ref 3.95–5.11)
SEG NEUTROPHILS: 74 % (ref 36–66)
SEGMENTED NEUTROPHILS ABSOLUTE COUNT: 10.28 K/UL (ref 1.8–7.7)
SODIUM BLD-SCNC: 138 MMOL/L (ref 135–144)
WBC # BLD: 13.9 K/UL (ref 3.5–11.3)

## 2022-06-08 PROCEDURE — 36415 COLL VENOUS BLD VENIPUNCTURE: CPT

## 2022-06-08 PROCEDURE — 6370000000 HC RX 637 (ALT 250 FOR IP): Performed by: STUDENT IN AN ORGANIZED HEALTH CARE EDUCATION/TRAINING PROGRAM

## 2022-06-08 PROCEDURE — 94760 N-INVAS EAR/PLS OXIMETRY 1: CPT

## 2022-06-08 PROCEDURE — 6370000000 HC RX 637 (ALT 250 FOR IP)

## 2022-06-08 PROCEDURE — 1200000000 HC SEMI PRIVATE

## 2022-06-08 PROCEDURE — 6360000002 HC RX W HCPCS: Performed by: STUDENT IN AN ORGANIZED HEALTH CARE EDUCATION/TRAINING PROGRAM

## 2022-06-08 PROCEDURE — 85025 COMPLETE CBC W/AUTO DIFF WBC: CPT

## 2022-06-08 PROCEDURE — 99222 1ST HOSP IP/OBS MODERATE 55: CPT | Performed by: NURSE PRACTITIONER

## 2022-06-08 PROCEDURE — 97535 SELF CARE MNGMENT TRAINING: CPT

## 2022-06-08 PROCEDURE — 2580000003 HC RX 258: Performed by: STUDENT IN AN ORGANIZED HEALTH CARE EDUCATION/TRAINING PROGRAM

## 2022-06-08 PROCEDURE — 80048 BASIC METABOLIC PNL TOTAL CA: CPT

## 2022-06-08 PROCEDURE — 99232 SBSQ HOSP IP/OBS MODERATE 35: CPT | Performed by: INTERNAL MEDICINE

## 2022-06-08 RX ORDER — LAMOTRIGINE 25 MG/1
25 TABLET ORAL 2 TIMES DAILY
Qty: 300 TABLET | Refills: 3 | Status: CANCELLED | OUTPATIENT
Start: 2022-06-08

## 2022-06-08 RX ORDER — POTASSIUM CHLORIDE 20 MEQ/1
40 TABLET, EXTENDED RELEASE ORAL ONCE
Status: COMPLETED | OUTPATIENT
Start: 2022-06-08 | End: 2022-06-08

## 2022-06-08 RX ORDER — LAMOTRIGINE 25 MG/1
75 TABLET ORAL 2 TIMES DAILY
Qty: 300 TABLET | Refills: 3 | Status: SHIPPED | OUTPATIENT
Start: 2022-06-08 | End: 2022-09-22 | Stop reason: SDUPTHER

## 2022-06-08 RX ORDER — QUETIAPINE FUMARATE 25 MG/1
25 TABLET, FILM COATED ORAL NIGHTLY
Qty: 60 TABLET | Refills: 3 | Status: SHIPPED | OUTPATIENT
Start: 2022-06-08 | End: 2022-06-24

## 2022-06-08 RX ORDER — BISACODYL 10 MG
10 SUPPOSITORY, RECTAL RECTAL DAILY PRN
Qty: 30 SUPPOSITORY | Refills: 0 | Status: SHIPPED | OUTPATIENT
Start: 2022-06-08 | End: 2022-06-09

## 2022-06-08 RX ADMIN — MORPHINE SULFATE 15 MG: 15 TABLET, FILM COATED, EXTENDED RELEASE ORAL at 21:06

## 2022-06-08 RX ADMIN — ONDANSETRON 4 MG: 4 TABLET, ORALLY DISINTEGRATING ORAL at 11:30

## 2022-06-08 RX ADMIN — LAMOTRIGINE 125 MG: 100 TABLET ORAL at 08:54

## 2022-06-08 RX ADMIN — OXYCODONE HYDROCHLORIDE AND ACETAMINOPHEN 1 TABLET: 5; 325 TABLET ORAL at 17:03

## 2022-06-08 RX ADMIN — MORPHINE SULFATE 15 MG: 15 TABLET, FILM COATED, EXTENDED RELEASE ORAL at 09:12

## 2022-06-08 RX ADMIN — POTASSIUM CHLORIDE 40 MEQ: 1500 TABLET, EXTENDED RELEASE ORAL at 09:16

## 2022-06-08 RX ADMIN — PANTOPRAZOLE SODIUM 40 MG: 40 TABLET, DELAYED RELEASE ORAL at 08:54

## 2022-06-08 RX ADMIN — SERTRALINE 100 MG: 50 TABLET, FILM COATED ORAL at 08:54

## 2022-06-08 RX ADMIN — MORPHINE SULFATE 30 MG: 15 TABLET, FILM COATED, EXTENDED RELEASE ORAL at 09:15

## 2022-06-08 RX ADMIN — LORAZEPAM 1 MG: 1 TABLET ORAL at 21:27

## 2022-06-08 RX ADMIN — SENNOSIDES 8.6 MG: 8.6 TABLET, COATED ORAL at 21:07

## 2022-06-08 RX ADMIN — OXYCODONE HYDROCHLORIDE AND ACETAMINOPHEN 1 TABLET: 5; 325 TABLET ORAL at 11:31

## 2022-06-08 RX ADMIN — MORPHINE SULFATE 30 MG: 15 TABLET, FILM COATED, EXTENDED RELEASE ORAL at 21:08

## 2022-06-08 RX ADMIN — OXYCODONE HYDROCHLORIDE AND ACETAMINOPHEN 1 TABLET: 5; 325 TABLET ORAL at 21:14

## 2022-06-08 RX ADMIN — OXYCODONE HYDROCHLORIDE AND ACETAMINOPHEN 1 TABLET: 5; 325 TABLET ORAL at 04:30

## 2022-06-08 RX ADMIN — ONDANSETRON 4 MG: 2 INJECTION INTRAMUSCULAR; INTRAVENOUS at 16:29

## 2022-06-08 RX ADMIN — LAMOTRIGINE 125 MG: 100 TABLET ORAL at 21:07

## 2022-06-08 RX ADMIN — SODIUM CHLORIDE, PRESERVATIVE FREE 10 ML: 5 INJECTION INTRAVENOUS at 21:08

## 2022-06-08 RX ADMIN — APIXABAN 5 MG: 5 TABLET, FILM COATED ORAL at 08:54

## 2022-06-08 RX ADMIN — LEVETIRACETAM 1000 MG: 500 TABLET, FILM COATED ORAL at 21:40

## 2022-06-08 RX ADMIN — SODIUM CHLORIDE, PRESERVATIVE FREE 10 ML: 5 INJECTION INTRAVENOUS at 08:56

## 2022-06-08 RX ADMIN — LORAZEPAM 1 MG: 1 TABLET ORAL at 05:15

## 2022-06-08 RX ADMIN — APIXABAN 5 MG: 5 TABLET, FILM COATED ORAL at 21:07

## 2022-06-08 RX ADMIN — SENNOSIDES 8.6 MG: 8.6 TABLET, COATED ORAL at 08:54

## 2022-06-08 RX ADMIN — DOCUSATE SODIUM 100 MG: 100 CAPSULE ORAL at 08:54

## 2022-06-08 RX ADMIN — LEVETIRACETAM 1000 MG: 500 TABLET, FILM COATED ORAL at 08:54

## 2022-06-08 RX ADMIN — QUETIAPINE FUMARATE 25 MG: 25 TABLET ORAL at 21:06

## 2022-06-08 ASSESSMENT — PAIN DESCRIPTION - LOCATION
LOCATION: ABDOMEN;BACK

## 2022-06-08 ASSESSMENT — PAIN DESCRIPTION - ONSET: ONSET: ON-GOING

## 2022-06-08 ASSESSMENT — PAIN SCALES - GENERAL
PAINLEVEL_OUTOF10: 8
PAINLEVEL_OUTOF10: 3
PAINLEVEL_OUTOF10: 7
PAINLEVEL_OUTOF10: 7
PAINLEVEL_OUTOF10: 8
PAINLEVEL_OUTOF10: 9

## 2022-06-08 ASSESSMENT — PAIN - FUNCTIONAL ASSESSMENT: PAIN_FUNCTIONAL_ASSESSMENT: PREVENTS OR INTERFERES SOME ACTIVE ACTIVITIES AND ADLS

## 2022-06-08 ASSESSMENT — PAIN DESCRIPTION - PAIN TYPE: TYPE: CHRONIC PAIN;ACUTE PAIN

## 2022-06-08 ASSESSMENT — PAIN DESCRIPTION - DESCRIPTORS: DESCRIPTORS: ACHING;DISCOMFORT;CRAMPING

## 2022-06-08 ASSESSMENT — PAIN DESCRIPTION - ORIENTATION: ORIENTATION: LOWER;RIGHT;LEFT

## 2022-06-08 ASSESSMENT — PAIN DESCRIPTION - FREQUENCY: FREQUENCY: CONTINUOUS

## 2022-06-08 NOTE — CONSULTS
Palliative Care Inpatient Consult    NAME:  Melecio Norman [facility]  MEDICAL RECORD NUMBER:  [id number]  AGE: 59 y.o. GENDER: female  : 1965  TODAY'S DATE:  2022    Reasons for Consultation:    Symptom and/or pain management  Provision of information regarding PC and/or hospice philosophies  Complex, time-intensive communication and interdisciplinary psychosocial support  Clarification of goals of care and/or assistance with difficult decision-making  Guidance in regards to resources and transition(s)    Members of PC team contributing to this consultation are :  Yoselin Reyna, Palliative Care APRN-CNP  History of Present Illness     The patient is a 59 y.o. Non- / non  female who presents with Fatigue      Referred to Palliative Care by   [x] Physician   [] Nursing  [] Family Request   [] Other:       She was admitted to the primary service for Anemia [D64.9]  Anemia, unspecified type [D64.9]. Her hospital course has been associated with Anemia related to chemotherapy/myelosuppression, TRACIE, constipation with possible obstruction, thrombocytosis, leukocytosis, positive occult blood, and stage IV metastatic ovarian cancer. The patient has a complicated medical history and has been hospitalized since 6/3/2022  8:21 AM. I reviewed patients EMR, spoke to RN, and patient to collect history. Patient has history of Anemia, Cervical/Ovarian cancer, DM, GERD, splenic lesion with bleeding, blood clots, HTN, Depression, and metastatic cancer. Patient presented to the ED with complaints of fatigue. She has a history of stage IV ovarian cancer, and is actively on chemotherapy. She reports being too fatigued and weak to go to her chemotherapy session on day of arrival.  Patient is having trouble with ambulating, but denies any associated numbness/tingling. She does have some constipation over the past 4 days. She denies any fevers, abdominal pain, and/or nausea or vomiting.   Arrival patient appeared somewhat pale, and was mildly tachycardic. Patient's admitting pertinent labs included; hemoglobin 6.3, WBC 13.9, blood 794, sodium 134, potassium 3.6, albumin 2.3, and UA with trace leukocytes. Patient received 1 unit of packed red blood cells in the ED, and symptoms improved. Chest x-ray revealed mild prominence of interstitial markings suggest mild vascular congestion. CT abdomen/pelvis revealed worsening diffuse colonic distention with stool and air-filled colon and wall thickening, unchanged masslike partially calcified lesion in the pelvis, mild improved left inguinal lymphadenopathy, stable retroperitoneal lymphadenopathy, stable osseous metastatic disease, and worsened subcutaneous soft tissue edema most significant at left lower proximal thigh. EKG revealed ST, right cage axis, low voltage QRS, and nonspecific ST/T wave abnormality. Oncology and GI were consulted. Oncology reports original diagnosis in 2005 with multiple recurrences and diffuse metastasis. Carboplatin was added to Gemzar in March 2022 due to evidence of disease progression. Patient's iron was found to be 8. Occult blood was positive. GI reported chronic sigmoid stricture that is nontraversable by colonoscopy, and recommended continue stool softeners and intermittent use of MiraLAX. GI signed off. Patient required second transfusion on the fifth due to drop in hemoglobin. Patient is a full code. Palliative care consulted for review of goals, and family support.     6/8 pertinent labs include; WBC 13.9, hemoglobin 8.2, platelets 082, potassium 3.6    Active Hospital Problems    Diagnosis Date Noted    Iron deficiency [E61.1]      Priority: Medium    Gynecologic cancer ([facility] [C57.9] 05/29/2021    Chronic deep vein thrombosis (DVT) of femoral vein of left lower extremity (HCC) [I82.512] 03/11/2021    Fatigue [R53.83]     Anemia [D64.9]     Seizure ([facility] [R56.9] 10/21/2020       PAST MEDICAL HISTORY      Diagnosis Date    Anemia     Bleeding 10/2020    intra-abdominal bleeding -due to splenic mass with GI infiltration. Status post embolization    Cervical [facility] ([facility]     Depression     Diabetes mellitus ([address]     GERD (gastroesophageal reflux disease)     Hx of blood clots     Hypertension     Metastatic cancer ([facility] 10/2020    extensive intraabdominal and splenic involvement and lung mets.  Ovarian cancer ([facility]     low grade serous ovarian carcinoma    Post chemo evaluation     2007: Chemo via med onc (Dr. Yassine Sterling), 2008: Obey Sally due to rising CA-125, 2013: intraperitoneal chemo,12/2015: Ca125 - 25     Splenic lesion        PAST SURGICAL HISTORY  Past Surgical History:   Procedure Laterality Date    ABCESS DRAINAGE  2013    Franca rectal    ANUS SURGERY      ANAL FISSURECTOMY    CARDIAC CATHETERIZATION      COLECTOMY  03/2013    ex lap, tumor debulking, transverse colectomy w reanastamosis, subgastric omentectomy, intraperitoneal port placement    IR EMBOLIZATION HEMORRHAGE  10/05/2020    intra-abdominal bleeding -due to splenic mass with GI infiltration. Status post embolization [employer] interlock coils x7. mri condtional 3t ok, safe immediately post implant.  IR PORT PLACEMENT EQUAL OR GREATER THAN 5 YEARS  08/24/2020    IR PORT PLACEMENT EQUAL OR GREATER THAN 5 YEARS 8/24/2020 Jaspreet Hebert MD [facility]    PORT SURGERY      IP Port    TONSILLECTOMY      TOTAL ABDOMINAL HYSTERECTOMY         SOCIAL HISTORY  Social History     Tobacco Use    Smoking status: Current Every Day Smoker     Packs/day: 1.00     Years: 20.00     Pack years: 20.00     Types: Cigarettes    Smokeless tobacco: Never Used   Vaping Use    Vaping Use: Never used   Substance Use Topics    Alcohol use: Not Currently    Drug use: Never       FAMILY HISTORY  . Meagan Darnell   Family History   Problem Relation Age of Onset    Alcohol Abuse Mother     Cirrhosis Mother         ALLERGIES  Allergies   Allergen Reactions    Take 1 tablet by mouth 2 times daily 60 tablet 11    QUEtiapine (SEROQUEL) 50 MG tablet TAKE 1 TABLET BY MOUTH ONCE DAILY AT BEDTIME ALONG WITH 25MG 30 tablet 0    melatonin 5 MG TABS tablet Take 1 tablet by mouth daily 30 tablet 3    benzonatate (TESSALON PERLES) 100 MG capsule Take 1 capsule by mouth 3 times daily as needed for Cough 30 capsule 1    apixaban (ELIQUIS) 5 MG TABS tablet Take 1 tablet by mouth 2 times daily 60 tablet 5    sertraline (ZOLOFT) 100 MG tablet TAKE 1 TABLET BY MOUTH DAILY 30 tablet 4    levETIRAcetam (KEPPRA) 1000 MG tablet Take 1 tablet by mouth 2 times daily 60 tablet 3    melatonin 5 MG TABS tablet Take 1 tablet by mouth nightly as needed (sleep) 30 tablet 0    hydrocortisone (ANUSOL-HC) 2.5 % CREA rectal cream Apply on affected area twice daily 1 each 1    lamoTRIgine (LAMICTAL) 25 MG tablet Take 5 tablets by mouth 2 times daily 300 tablet 3    ferrous sulfate (IRON 325) 325 (65 Fe) MG tablet Take 1 tablet by mouth daily (with breakfast) 30 tablet 2       Data         /73   Pulse (!) 106   Temp 97.9 °F (36.6 °C) (Oral)   Resp 18   Wt 160 lb (72.6 kg)   SpO2 93%   BMI 26.63 kg/m²     Wt Readings from Last 3 Encounters:   06/03/22 160 lb (72.6 kg)   05/13/22 170 lb (77.1 kg)   05/06/22 168 lb 8 oz (76.4 kg)        Code Status: Full Code     ADVANCED CARE PLANNING:  Patient has capacity for medical decisions: yes  Health Care Power of : yes  Living Will: yes     Personal, Social, and Family History  Marital Status:   Living situation:with family:  son and Ex-  Importance of bernardo/Yazidism/spiritual beliefs: [] Very [x] Somewhat [] Not   Psychological Distress: mild  Does patient understand diagnosis/treatment? yes  Does caregiver understand diagnosis/treatment?  yes      Assessment        REVIEW OF SYSTEMS  Constitutional: no fever, no chills or weight loss  Eyes: no eye pain or blurred vision  ENT: no hearing loss, congestion, or difficulty swallowing   Respiratory: no wheezing, chest tightness, or shortness of breath   Cardiovascular: no chest pain or pressure, no palpitations, no diaphoresis   Gastrointestinal: no nausea, vomiting, diarrhea or constipation, no melena +abdominal pain  Genitourinary: no dysuria, frequency,hematuria , or nocturia   Musculoskeletal: no myalgias or arthralgias, +back pain/LLE swelling  Skin: no rashes or sores   Neurological: no focal weakness, numbness, tingling, or headache, no seizures  Psych: +depression     PHYSICAL ASSESSMENT:  Constitutional: Alert and oriented to person, place, and time. Head: Normocephalic and atraumatic. Eyes: EOM are normal. Pupils are equal, round   Neck: Normal range of motion. Neck supple. No tracheal deviation present. Cardiovascular: Normal rate and regular rhythm, S1, S2, no murmur   Pulmonary/Chest: Effort normal and breath sounds normal. No rales or wheezes. Abdomen: Soft. No tenderness, not distended, no ascites, no organomegaly   Musculoskeletal: Normal range of motion. +3LLE/+1 RLE edema  Neurological: CN II-XII grossly intact, no focal neurological deficits   Skin: Normal turgor, no bleeding, no bruising     Palliative Performance Scale:  ___60%  Ambulation reduced; Significant disease; Can't do hobbies/housework; intake normal or reduced; occasional assist; LOC full/confusion  ___50%  Mainly sit/lie; Extensive disease; Can't do any work; Considerable assist; intake normal or reduced; LOC full/confusion  _x__40%  Mainly in bed; Extensive disease; Mainly assist; intake normal or reduced; LOC full/confusion   ___30%  Bed Bound; Extensive disease; Total care; intake reduced; LOCfull/confusion  ___20%  Bed Bound; Extensive disease; Total care; intake minimal; Drowsy/coma  ___10%  Bed Bound; Extensive disease; Total care; Mouth care only; Drowsy/coma  ___0       Death      Plan      Palliative Interaction: I went to see patient, and she was lying in bed awake. No family present.  I introduced myself to her and the palliative role. I discussed patients cancer journey thus far, She was Dx in 2005, and last scan in March showed progression of disease so another agent was added to her Tx. She reports somewhat tolerating treatment thus far, but mobility status is very limited. She reports not being able to ambulate well since LLE swelling and reports having previous DVT in this leg but reports repeat scans didn't show anything new. She reports wanting to get off Seroquel, as she doesn't like the SE. She denies any radiation treatment in the past.     I discussed the goals with palliative treatment, and patient verbalized understanding. She discusses the frustration with her son/POA, and her limited abilities. She reports son thinks she does not need all the pain meds she is taking, and is frustrated with her not moving and SE of constipation. We discussed the goal of symptom management including pain that we will assist to decrease pain but it will not go away. We discussed goal of getting pain to a tolerable level so she can function but not sedate her to where she can not. Patient verbalized understanding. She reports pain is typically in abdomen and back. She rates pain 8/10 on pain scale now. She reports abdomen being constant and cramping and back mainly achy. I discussed palliative care outpatient and options with this. Patients son/POA called and was placed on speaker per patient to join conversation. I introduced myself to him and the palliative role. He reports being a cancer patient himself and goes to [facility]. He reports that he is sometimes gone and is unable to help her. They report two teenagers in home and Ex-/sons dad. Son explains that they do not have enough support. He reports patient being unable to care for self, and patient reports that she doesn't always have meals prepared for her.      We discussed the need for patient to maintain strength and nutrition if she wants to be strong enough to continue on cancer treatments. Patient reports this as her goal. She is agreeable to SNF, and wants home care with Nursing/Aide/Therapy at [place]. She is also agreeable to palliative care at [place]. I discussed the differences in palliative care vs hospice with son and he reports understanding. He reports not wanting to discuss the terminal aspect because they have been told this and she has lived many years. Son tells writer and patient agrees that she is depressed. She reports dealing with grief from loss of her son. She now has 3 bio children living with one OOT. I reviewed HCPOA, and patient is aware that if she wants to change it, that this can be done at anytime while in the hospital.     I discussed counseling/resource person meeting with patient and she is agreeable. Referral sent to [facility] for psychosocial counseling. I offered patient continued support throughout visit and she reported feeling better after talking with writer.      Education/support to staff  Education/support to family  Education/support to patient  Discharge planning/helping to coordinate care  Communications with primary service  Pharmacologic pain management  Providing support for coping/adaptation/distress of family  Providing support for coping/adaptation/distress of patient  Discussing meaning/purpose   Specific spiritual beliefs/practices  Decision making regarding life prolonging treatment  Decisional capacity assessed  Continue with current plan of care  Clarification of medical condition to patient and family  Palliative care orders introduced  Provided information about hospice  Validating patient/family distress  Recognizing, reflecting, and empathizing with the patient's anticipatory grief  Principle Problem/Diagnosis:  Anemia    Additional Assessments:   Principal Problem:    Anemia  Active Problems:    Iron deficiency    Seizure ([address]    Fatigue    Chronic deep vein thrombosis (DVT) of femoral vein of left lower extremity (HCC)    Gynecologic cancer [facility])  Resolved Problems:    Constipation    1- Symptom management/ pain control     Pain Assessment:  Pain is controlled with current analgesics. Medication(s) being used: narcotic analgesics including Percocet PRN. Anxiety:  difficulty concentrating, fatigue, irritable- Ativan PRN                          Dyspnea:  acute dyspnea - on NC                           Fatigue:  generalized weakness    Other: We feel the patient symptoms are being controlled. her current regimen is reviewed by myself and discussed with the staff. 2- Goals of care evaluation    The patient goals of care are live longer, improve or maintain function/quality of life, preserve independence/autonomy/control and support for family/caregiver   Goals of care discussed with:     [] Patient independently    [x] Patient and Family    [] Family or Healthcare DPOA independently    [] Unable to discuss with patient, family/DPOA not present    3- Code Status  Full Code    4- Other recommendations   - We will continue to provide comfort and support to the patient and the family  Please call with any palliative questions or concerns. Palliative Care Team is available via perfect serve or via phone. Palliative Care will continue to follow Ms. Garcia's care as needed. Thank you for allowing Palliative Care to participate in the care of Ms. Garcia . This note has been dictated by dragon, typing errors may be a possibility. The total time I spent in seeing the patient, discussing goals of care, advanced directives, code status and other major issues was more than 60 minutes      Electronically signed by   ESTRELLITA Valero CNP  Palliative Care Team  on 6/8/2022 at 8:58 AM    Palliative Care can be reached via [employer].

## 2022-06-08 NOTE — CARE COORDINATION
Met with patient to discuss transitional planning. Freedom of choice provided. Patient requested referrals be sent to [facility] and [address] [place] [address]. Patient agreeable to plan. Referrals sent    [phone]: call placed to [facility] with Lucy @ [phone] to notify of new referral and patient likely ready for discharge today. [employer] states she will pull asap and review. Call also placed to [facility] and [facility] message to notify of new referral and patient ready for discharge. Requested call back. [phone]: lucy geller accepting. Starting auth.  Clinical faxed to [facility] with [employer] for prior auth @ [phone]

## 2022-06-08 NOTE — PLAN OF CARE
Problem: Chronic Conditions and Co-morbidities  Goal: Patient's chronic conditions and co-morbidity symptoms are monitored and maintained or improved  6/8/2022 1858 by Jaci Neves RN  Outcome: Progressing  Flowsheets (Taken 6/8/2022 0800)  Care Plan - Patient's Chronic Conditions and Co-Morbidity Symptoms are Monitored and Maintained or Improved: Monitor and assess patient's chronic conditions and comorbid symptoms for stability, deterioration, or improvement  6/8/2022 0546 by Luis A Lopes RN  Outcome: Progressing     Problem: Pain  Goal: Verbalizes/displays adequate comfort level or baseline comfort level  6/8/2022 1858 by Jaci Neves RN  Outcome: Progressing  6/8/2022 0546 by Luis A Lopes RN  Outcome: Progressing     Problem: Skin/Tissue Integrity  Goal: Absence of new skin breakdown  Description: 1. Monitor for areas of redness and/or skin breakdown  2. Assess vascular access sites hourly  3. Every 4-6 hours minimum:  Change oxygen saturation probe site  4. Every 4-6 hours:  If on nasal continuous positive airway pressure, respiratory therapy assess nares and determine need for appliance change or resting period.   6/8/2022 1858 by Jaci Neves RN  Outcome: Progressing  6/8/2022 0546 by Luis A Lopes RN  Outcome: Progressing     Problem: Safety - Adult  Goal: Free from fall injury  6/8/2022 1858 by Jaci Neves RN  Outcome: Progressing  Flowsheets (Taken 6/8/2022 0800)  Free From Fall Injury: Instruct family/caregiver on patient safety  6/8/2022 0546 by Luis A Lopes RN  Outcome: Progressing     Problem: ABCDS Injury Assessment  Goal: Absence of physical injury  6/8/2022 1858 by Jaci Neves RN  Outcome: Progressing  Flowsheets (Taken 6/8/2022 0800)  Absence of Physical Injury: Implement safety measures based on patient assessment  6/8/2022 0546 by Luis A Lopes RN  Outcome: Progressing     Problem: Hematologic - Adult  Goal: Maintains hematologic stability  6/8/2022 1858 by Jaci Neves RN  Outcome: Progressing  Flowsheets (Taken 6/8/2022 0800)  Maintains hematologic stability: Assess for signs and symptoms of bleeding or hemorrhage  6/8/2022 0546 by Jesusita Diaz RN  Outcome: Progressing

## 2022-06-08 NOTE — PROGRESS NOTES
Today's Date: 6/8/2022  Patient Name: Anson Barton  Date of admission: 6/3/2022  8:21 AM  Patient's age: 59 y.o., 1965  Admission Dx: Anemia [D64.9]  Anemia, unspecified type [D64.9]    Reason for Consult: management recommendations  Requesting Physician: No admitting provider for patient encounter. CHIEF COMPLAINT: Fatigue/patient on chemotherapy    History Obtained From:  Patient      Interval history:    Patient seen and examined  Labs and vitals reviewed  Resting comfortably  Patient on Eliquis  H&H is stable  Awaiting placement  No new complaint interval event      HISTORY OF PRESENT ILLNESS:      The patient is a 59 y.o.  female who is admitted to the hospital for severe fatigue she was supposed to get her chemotherapy for stage IV ovarian cancer today and reported difficulty ambulating no fever or chills abdominal pain nausea vomiting she has constipation and she was admitted for observation, hemoglobin at 6.3 with a platelet at 000 and WBC 13.9    Oncology history as below       DIAGNOSIS:       Recurrent ovarian cancer. Original diagnosis 2005 with multiple recurrences. Diffuse metastasis. CURRENT THERAPY:         Doxil/ Avastin started 9/18/2020. Avastin was discontinued due to recent GI bleeding. Status post recent vascular embolization  S/p seizure disorder. Gemzar started 2/26/2021. Interrupted due to hospitalization and problem with compliance. Evidence of disease progression on CT scan 2/22/22  Add Carboplatin to Gemzar March 2022        Past Medical History:   has a past medical history of Anemia, Bleeding, Cervical cancer ([address] Depression, Diabetes mellitus ([address] GERD (gastroesophageal reflux disease), Hx of blood clots, Hypertension, Metastatic cancer ([address] Ovarian cancer ([address] Post chemo evaluation, and Splenic lesion. Past Surgical History:   has a past surgical history that includes Hysterectomy, total abdominal; Port Surgery;  Tonsillectomy; IR PORT PLACEMENT > 5 YEARS (2020); Anus surgery; Abscess Drainage (); colectomy (2013); IR EMBOLIZATION HEMORRHAGE (10/05/2020); and Cardiac catheterization. Medications:    Reviewed in Epic     Allergies:  Ceftriaxone    Social History:   reports that she has been smoking cigarettes. She has a 20.00 pack-year smoking history. She has never used smokeless tobacco. She reports previous alcohol use. She reports that she does not use drugs. Family History: family history includes Alcohol Abuse in her mother; Cirrhosis in her mother. REVIEW OF SYSTEMS:    Constitutional: No fever or chills. No night sweats,+weight loss ,+ fatigue  Eyes: No eye discharge, double vision, or eye pain   HEENT: negative for sore mouth, sore throat, hoarseness and voice change   Respiratory: negative for cough , sputum, dyspnea, wheezing, hemoptysis, chest pain   Cardiovascular: negative for chest pain, dyspnea, palpitations, orthopnea, PND   Gastrointestinal: negative for nausea, vomiting, diarrhea, constipation, abdominal pain, Dysphagia, hematemesis and hematochezia   Genitourinary: negative for frequency, dysuria, nocturia, urinary incontinence, and hematuria   Integument: negative for rash, skin lesions, bruises.    Hematologic/Lymphatic: negative for easy bruising, bleeding, lymphadenopathy, or petechiae   Endocrine: negative for heat or cold intolerance,weight changes, change in bowel habits and hair loss   Musculoskeletal: negative for myalgias, arthralgias, pain, joint swelling,and bone pain   Neurological: negative for headaches, dizziness, seizures, weakness, numbness    PHYSICAL EXAM:      /63   Pulse (!) 108   Temp 97.7 °F (36.5 °C) (Oral)   Resp 18   Wt 160 lb (72.6 kg)   SpO2 94%   BMI 26.63 kg/m²    Temp (24hrs), Av.1 °F (36.7 °C), Min:97.7 °F (36.5 °C), Max:98.5 °F (36.9 °C)    General appearance -ill looking2  Mental status - alert and cooperative   Eyes - pupils equal and reactive, extraocular eye movements intact   Ears - bilateral TM's and external ear canals normal   Mouth - mucous membranes moist, pharynx normal without lesions   Neck - supple, no significant adenopathy   Lymphatics - no palpable lymphadenopathy, no hepatosplenomegaly   Chest - clear to auscultation, no wheezes, rales or rhonchi, symmetric air entry   Heart - normal rate, regular rhythm, normal S1, S2, no murmurs  Abdomen - soft, nontender, nondistended, no masses or organomegaly   Neurological - alert, oriented, normal speech, no focal findings or movement disorder noted   Musculoskeletal - no joint tenderness, deformity or swelling   Extremities - peripheral pulses normal, no pedal edema, no clubbing or cyanosis   Skin - normal coloration and turgor, no rashes, no suspicious skin lesions noted ,    DATA:    Labs:   CBC:   Recent Labs     06/07/22  1151 06/08/22  0441   WBC 17.8* 13.9*   HGB 8.4* 8.2*   HCT 27.8* 27.2*   * 504*     BMP:   Recent Labs     06/07/22  0859 06/08/22  0441   * 138   K 3.9 3.6*   CO2 23 23   BUN 10 11   CREATININE 0.37* 0.31*   LABGLOM >60 >60   GLUCOSE 128* 88     PT/INR: No results for input(s): PROTIME, INR in the last 72 hours. IMAGING DATA:  CT ABDOMEN PELVIS W IV CONTRAST Additional Contrast? None   Final Result   Worsened diffuse colonic distension with stool and air-filled colon and wall   thickening. Suggestion of a segment of narrowing of the rectosigmoid colon   redemonstrated, similar or slightly improved from prior study. Chronic   colonic obstruction/stricture of versus Marne syndrome are considerations. Correlate with direct visualization if indicated. Unchanged masslike partially calcified lesion in the pelvis may be compatible   with known history of malignancy or post treatment changes in this location. mild improved left inguinal lymphadenopathy, otherwise stable retroperitoneal   lymphadenopathy with some partially calcified lymph nodes. Worsened subcutaneous soft tissue edema most significant at the left lower   proximal thigh could be related to lymphedema. Stable osseous metastatic disease. XR CHEST PORTABLE   Final Result   Stable support line      Mild prominence of interstitial markings suggests mild vascular congestion             Primary Problem  Anemia    Active Hospital Problems    Diagnosis Date Noted    Iron deficiency [E61.1]      Priority: Medium    Gynecologic cancer [facility]) [C57.9] 05/29/2021    Chronic deep vein thrombosis (DVT) of femoral vein of left lower extremity (HCC) [I82.512] 03/11/2021    Fatigue [R53.83]     Anemia [D64.9]     Seizure ([address] [R56.9] 10/21/2020         IMPRESSION:   1. Severe anemia related to chemotherapy/myelosuppression at the time of anemia of chronic disease/cancer  2. Recurrent metastatic ovarian cancer  3. Severe weakness fatigue due to above  4. Thrombocytosis likely reactive  5. Leukocytosis  6. Constipation/concern about bowel obstruction with abnormal CT of the abdomen      RECOMMENDATIONS:  1. I reviewed the laboratory data, imaging studies, discussed the diagnosis and possible treatment   2. Blood transfusion to keep hemoglobin above 7  3. Patient on Eliquis. Ultrasound from 5/2022 shows proximal femoral DVT  4. Denies any deonte bleeding  Monitor H&H okay to discharge from medical oncology standpoint    Discussed with patient and Nurse. Thank you for asking us to see this patient. Manolo Mcrae MD                 This note is created with the assistance of a speech recognition program.  While intending to generate a document that actually reflects the content of the visit, the document can still have some errors including those of syntax and sound a like substitutions which may escape proof reading. It such instances, actual meaning can be extrapolated by contextual diversion.

## 2022-06-08 NOTE — DISCHARGE INSTR - COC
Continuity of Care Form    Patient Name: Mario Alex   :  1965  MRN:  [id number]    Admit date:  6/3/2022  Discharge date:  2022     Code Status Order: Full Code   Advance Directives:      Admitting Physician:  No admitting provider for patient encounter. PCP: Danielle Loya DO    Discharging Nurse: Gina Jarquin RN  Discharging Hospital Unit/Room#: 4303/7636-07  Discharging Unit Phone Number: [phone]    Emergency Contact:   Extended Emergency Contact Information  Primary Emergency Contact: Yovani Garcia  Address: [address] [place] [address], [address] [place] [address]  Home Phone: [phone]  Relation: Child  Secondary Emergency Contact: Burt Roche hipaa  Address: n/a  Home Phone: [phone]  Work Phone: [phone]  Mobile Phone: [phone]  Relation: Child    Past Surgical History:  Past Surgical History:   Procedure Laterality Date    ABCESS DRAINAGE      Franca rectal    ANUS SURGERY      ANAL FISSURECTOMY    CARDIAC CATHETERIZATION      COLECTOMY  2013    ex lap, tumor debulking, transverse colectomy w reanastamosis, subgastric omentectomy, intraperitoneal port placement    IR EMBOLIZATION HEMORRHAGE  10/05/2020    intra-abdominal bleeding -due to splenic mass with GI infiltration. Status post embolization [employer] interlock coils x7. mri condtional 3t ok, safe immediately post implant.     IR PORT PLACEMENT EQUAL OR GREATER THAN 5 YEARS  2020    IR PORT PLACEMENT EQUAL OR GREATER THAN 5 YEARS 2020 Gabby Leon MD [facility] PROCEDURES    PORT SURGERY      IP Port    TONSILLECTOMY      TOTAL ABDOMINAL HYSTERECTOMY         Immunization History:   Immunization History   Administered Date(s) Administered    Hepatitis B 2008    Influenza A (V2Y4-46) Vaccine PF IM 2009    Influenza, MDCK Quadv, IM, PF (Flucelvax 2 yrs and older) 2018, 2019    Influenza, Quadv, IM, PF (6 mo and older Fluzone, Flulaval, Fluarix, and 3 yrs and older Afluria) 10/21/2020    Pneumococcal Conjugate 13-valent ([id number]) 10/21/2020    Pneumococcal Polysaccharide ([id number]) 12/01/2010, 12/11/2020       Active Problems:  Patient Active Problem List   Diagnosis Code    Malignant neoplasm of ovary ([facility] C56.9    Seizure ([facility]) R56.9    Type 2 diabetes mellitus without complication, without long-term current use of insulin (HCC) E11.9    Anemia D64.9    Splenic abscess D73.3    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 [facility]) C79.60    Acute encephalopathy G93.40    PRES (posterior reversible encephalopathy syndrome) I67.83    Hemianopia, bitemporal H53.47    Encephalopathy acute G93.40    Status epilepticus ([facility]) G40.901    History of ovarian cancer Z85.43    Pleural effusion J90    Bandemia D72.825    Cancer, metastatic to bone ([facility]) C79.51    Intractable low back pain M54.59    Fatigue R53.83    Chronic deep vein thrombosis (DVT) of femoral vein of left lower extremity ([facility]) I82.512    Iron deficiency anemia secondary to inadequate dietary iron intake D50.8    Iron malabsorption K90.9    Gynecologic cancer ([facility]) C57.9    Thrombocytosis D75.839    History of deep venous thrombosis (DVT) of distal vein of left lower extremity: On Eliquis Z86.718    Pelvic mass R19.00    Acute on chronic anemia D64.9    AMS (altered mental status) R41.82    Lactic acidosis E87.2    Anxiety F41.9    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 ([facility]) I82.512    Diabetes mellitus ([facility]) E11.9    Gastroesophageal reflux disease K21.9    Recurrent major depressive disorder, in partial remission ([facility]) F33.41    Ileus ([facility]) K56.7    Pericardial effusion I31.3    Hypokalemia E87.6    Acute respiratory failure with hypoxia ([facility]) U11.27    Acute metabolic encephalopathy I36.14    Confusion R41.0    Urinary tract infection without hematuria N39.0    Seizure disorder ([facility]) G40.909    Breakthrough seizure ([facility]) G40.919    Leg swelling M79.89    Iron deficiency E61.1       Isolation/Infection:   Isolation No Isolation          Patient Infection Status       Infection Onset Added Last Indicated Last Indicated By Review Planned Expiration Resolved Resolved By    None active    Resolved    COVID-19 (Rule Out) 08/20/20 08/20/20 08/21/20 Covid-19 Ambulatory (Ordered)   08/22/20 Rule-Out Test Resulted            Nurse Assessment:  Last Vital Signs: /63   Pulse (!) 108   Temp 97.7 °F (36.5 °C) (Oral)   Resp 18   Wt 160 lb (72.6 kg)   SpO2 94%   BMI 26.63 kg/m²     Last documented pain score (0-10 scale): Pain Level: 8  Last Weight:   Wt Readings from Last 1 Encounters:   06/03/22 160 lb (72.6 kg)     Mental Status:  oriented and alert    IV Access:  - None    Nursing Mobility/ADLs:  Walking   Assisted  Transfer  Assisted  Bathing  Assisted  Dressing  Assisted  Toileting  Assisted  Feeding  Independent  Med Admin  Assisted  Med Delivery   whole    Wound Care Documentation and Therapy:  Puncture 01/12/21 Wrist Anterior;Right (Active)   Number of days: 511        Elimination:  Continence: Bowel: Yes  Bladder: No  Urinary Catheter: None   Colostomy/Ileostomy/Ileal Conduit: No       Date of Last BM: 6/13/2022     Intake/Output Summary (Last 24 hours) at 6/8/2022 1150  Last data filed at 6/7/2022 1736  Gross per 24 hour   Intake 400 ml   Output 900 ml   Net -500 ml     I/O last 3 completed shifts: In: 760 [P.O.:760]  Out: 900 [Urine:900]    Safety Concerns:     History of Falls (last 30 days) and At Risk for Falls    Impairments/Disabilities:      None    Nutrition Therapy:  Current Nutrition Therapy:   - Oral Diet:  General    Routes of Feeding: Oral  Liquids: No Restrictions  Daily Fluid Restriction: no  Last Modified Barium Swallow with Video (Video Swallowing Test): not done    Treatments at the Time of Hospital Discharge:   Respiratory Treatments: NA  Oxygen Therapy:  is not on home oxygen therapy.   Ventilator:    - No ventilator support    Rehab Therapies: Physical Therapy and Occupational Therapy  Weight Bearing Status/Restrictions: No weight bearing restrictions  Other Medical Equipment (for information only, NOT a DME order):  wheelchair, bath bench, and bedside commode  Other Treatments: ***    Patient's personal belongings (please select all that are sent with patient):  Glasses    RN SIGNATURE:  Electronically signed by Raman Orozco RN on 6/13/22 at 2:49 PM EDT    CASE MANAGEMENT/SOCIAL WORK SECTION    Inpatient Status Date: ***    Readmission Risk Assessment Score:  Readmission Risk              Risk of Unplanned Readmission:  37           Discharging to Facility/ Agency   [address] [place] [address] and Nursing at [address] [facility] Details  Fax          [address] [facility], [address] [place]       Phone: [phone]       Fax: [phone]          Dialysis Facility (if applicable)   Name:  Address:  Dialysis Schedule:  Phone:  Fax:    / signature: Electronically signed by Emily Fuentes RN on 6/13/22 at 2:42 PM EDT    PHYSICIAN SECTION    Prognosis: Fair    Condition at Discharge: Stable    Rehab Potential (if transferring to Rehab): Fair    Recommended Labs or Other Treatments After Discharge: CBC after 1 week to monitor Hb. Follow-up with hematology/oncology, follow-up with gastroenterology in 1 week    Physician Certification: I certify the above information and transfer of Prince Lester  is necessary for the continuing treatment of the diagnosis listed and that she requires [place] for less 30 days.      Update Admission H&P: No change in H&P    PHYSICIAN SIGNATURE:  Electronically signed by Lucila Salvador MD on 6/8/22 at 12:40 PM EDT

## 2022-06-08 NOTE — PROGRESS NOTES
Occupational Therapy  Facility/Department: [facility]  Occupational Therapy Treatment Note    Name: Otf Lai [facility]  : 1965  MRN: [id number]  Date of Service: 2022    Discharge Recommendations:  Patient would benefit from continued therapy after discharge     Chief Complaint   Patient presents with    Fatigue     Patient Diagnosis(es): The primary encounter diagnosis was Anemia, unspecified type. Diagnoses of Grief at loss of child, Recurrent major depressive disorder, in partial remission ([address] and Malignant neoplasm of ovary, unspecified laterality ([address] were also pertinent to this visit. Past Medical History:  has a past medical history of Anemia, Bleeding, Cervical cancer ([address] Depression, Diabetes mellitus ([address] GERD (gastroesophageal reflux disease), Hx of blood clots, Hypertension, Metastatic cancer ([address] Ovarian cancer ([address] Post chemo evaluation, and Splenic lesion. Past Surgical History:  has a past surgical history that includes Hysterectomy, total abdominal; Port Surgery; Tonsillectomy; IR PORT PLACEMENT > 5 YEARS (2020); Anus surgery; Abscess Drainage (); colectomy (2013); IR EMBOLIZATION HEMORRHAGE (10/05/2020); and Cardiac catheterization. Assessment    Assessment: Pt recommended to continue acute OT to maximize safety and independence throughout functional tasks. Pt unsafe to return to prior living arrangement and expected to require 24/7 assist/supervision upon discharge unless progresses toward goals. REQUIRES OT FOLLOW-UP: Yes  Activity Tolerance  Activity Tolerance: Treatment limited secondary to medical complications (free text); Patient limited by pain (Pt declined further treatment due to complaints of nausea)        Plan   Plan  Times per Week: 3-4 x week     Restrictions  Restrictions/Precautions  Restrictions/Precautions: Seizure,Fall Risk,Up as Tolerated,Bed Alarm  Required Braces or Orthoses?: No  Position Activity Restriction  Other position/activity restrictions: History of seizure    Subjective   General  Patient assessed for rehabilitation services?: Yes  Response to previous treatment: Patient with no complaints from previous session  Family / Caregiver Present: No  General Comment  Comments: RN ok'd pt for therapy this date. Pt reluctantly agreeable to session and pleasant/cooperative throughout. Objective   Heart Rate: (!) 108  Heart Rate Source: Monitor  BP: 118/63  BP Location: Left upper arm  BP Method: Automatic  Patient Position: Semi fowlers  MAP (Calculated): 81.33  Resp: 18  SpO2: 94 %  O2 Device: None (Room air)     Safety Devices  Type of Devices: Left in bed;Nurse notified; Patient at risk for falls;Call light within reach  Restraints  Restraints Initially in Place: No  Balance  Sitting: With support (Supervision for static and dynamic sitting during bathing/dressing ~15 min total)  Standing: With support (CGA for static standign following transfers ~2 min total)  Gait  Overall Level of Assistance: Contact-guard assistance (with RW)  Interventions: Demonstration;Verbal cues; Visual cues; Tactile cues; Safety awareness training  Toilet Transfers  Toilet - Technique: Ambulating  Equipment Used: Standard toilet  Toilet Transfer: Contact guard assistance     ADL  UE Bathing: Minimal assistance;Setup; Increased time to complete (assist for back while seated sinkside for sponge bathing)  LE Bathing: Contact guard assistance;Setup; Increased time to complete (pt washed alessandro area and thighs only this date while seated sinkside for sponge bathing, declining washing below knees due to desire to keep compression stockings on)  UE Dressing: Minimal assistance;Setup (assist to thread bilat UEs and tie hospital gown while seated)  Toileting: Maximum assistance;Setup (to don pure-wick and brief while supine in bed)     Bed mobility  Rolling to Left: Supervision (x1 during brief change)  Rolling to Right: Supervision (x1 during brief change)  Supine to Sit: Contact guard assistance  Sit to Supine: Contact guard assistance  Bed Mobility Comments: HOB elevated ~30 degrees. no use of rails. Pt required increased time/effort and min verbal cues for safety awareness, proper positioning, initiation and sequencing throughout. Transfers  Sit to stand: Contact guard assistance  Stand to sit: Contact guard assistance  Transfer Comments: To/from EOB with use of RW. Pt required increased time/effort and min verbal cues for safety awareness, proper positioning, initiation and sequencing, proper use of RW throughout. Cognition  Overall Cognitive Status: Exceptions  Arousal/Alertness: Delayed responses to stimuli  Following Commands: Follows multistep commands with increased time  Attention Span: Attends with cues to redirect  Safety Judgement: Decreased awareness of need for safety  Insights: Decreased awareness of deficits  Initiation: Requires cues for some  Sequencing: Requires cues for some     Education Given To: Patient  Education Provided: Role of Therapy;Plan of Care;Transfer Training;ADL Adaptive Strategies; Energy Conservation  Education Provided Comments: Safety awareness, importance of OOB activity, proper positioning during functional transfers/mobility - fair return  Education Method: Demonstration;Verbal  Barriers to Learning: None  Education Outcome: Verbalized understanding;Continued education needed    AM-PAC Score  AM-[facility] Inpatient Daily Activity Raw Score: 18 (06/08/22 1509)  AM-PAC Inpatient ADL T-Scale Score : 38.66 (06/08/22 1509)  ADL Inpatient CMS 0-100% Score: 46.65 (06/08/22 1509)  ADL Inpatient CMS G-Code Modifier : CK (06/08/22 1509)    Goals  Short Term Goals  Time Frame for Short term goals: Pt will, by discharge:  Short Term Goal 1: Dem cga for adl performance  Short Term Goal 2: Dem I with functional transfers for daily occupations  Short Term Goal 3: Dem sba for dynamic mobility with LRD for adl completion  Short Term Goal 4: Dem good safety awareness tech for all transfers/mobility  Short Term Goal 5: Dem a 10 min static stand for hygiene purposes to increase activity tolerance     Therapy Time   Individual Concurrent Group Co-treatment   Time In 1406         Time Out 1436         Minutes 30         Timed Code Treatment Minutes: 23 Minutes     Ousmane Ojeda, OTR/L

## 2022-06-08 NOTE — PROGRESS NOTES
SPIRITUAL CARE DEPARTMENT - [facility] [facility]  PROGRESS NOTE    Shift date: 06/07/2022  Shift day: Tuesday   Shift # 2    Room # 7566/3539-89   Name: Prince Lester                Denominational:    Place of Yazidi:     Referral: Consult    Admit Date & Time: 6/3/2022  8:21 AM    Assessment:  Prince Lester is a 59 y.o. female in the hospital because she has anemia and is struggling with medical issues involving cancer and chemo therapy . Upon entering the room writer observes pt. In bed, anxious and trying to get comfortable. Intervention:  Writer introduced self and title as  Writer offered space for pt  to express feelings, needs, and concerns and provided a ministry presence. Pt. Reports that she \"does not want her son Will, who she named in documents to make decisions for her\".  offered to complete new documents according to pt's wishes. She declined the offer and also reported that she is her own decision make and not her son. She reports that she does NOT wish to fill out new paperwork at this time and did not choose a new MPOA. Pt. Appeared sad and confused.  offered active listening and emotional support. Outcome:  Pt. Reported that she wished to rest and asked that \"everyone leave her alone\".  spoke with RN and relayed pt's wishes. Plan:  Chaplains will remain available to offer spiritual and emotional support as needed.       Electronically signed by Margarita Bah on 6/7/2022 at 11:55 PM.  [facility]  [phone]

## 2022-06-08 NOTE — ACP (ADVANCE CARE PLANNING)
Advance Care Planning     Advance Care Planning (ACP) Physician/NP/PA Conversation    Date of Conversation: 6/3/2022  Conducted with: Patient with [address] Decision Maker:      Primary Decision Maker: Edwardo - Child - [phone]    Secondary Decision Maker: Priscila [facility] - Child - [phone]    Click here to complete Healthcare Decision Makers including selection of the Healthcare Decision Maker Relationship (ie \"Primary\")  Today we documented Decision Maker(s) consistent with ACP documents on file. Care Preferences:    Hospitalization: \"If your health worsens and it becomes clear that your chance of recovery is unlikely, what would be your preference regarding hospitalization? \"  The patient would prefer hospitalization. Ventilation: \"If you were unable to breath on your own and your chance of recovery was unlikely, what would be your preference about the use of a ventilator (breathing machine) if it was available to you? \"  Patient is a full code- not ready to discuss    Resuscitation: \"In the event your heart stopped as a result of an underlying serious health condition, would you want attempts made to restart your heart, or would you prefer a natural death? \"  Patient is a full code- not ready to discuss    treatment goals, benefit/burden of treatment options and Palliative care    Conversation Outcomes / Follow-Up Plan:  ACP in process - information provided, considering goals and options  Reviewed DNR/DNI and patient elects Full Code (Attempt Resuscitation)    Length of Voluntary ACP Conversation in minutes:  <16 minutes (Non-Billable)    Celeste Diaz APRN - CNP

## 2022-06-08 NOTE — PROGRESS NOTES
[facility])  Occupational Therapy Not Seen Note    DATE: 2022    NAME: Radha Glasgow  MRN: [id number]   : 1965      Patient not seen this date for Occupational Therapy due to: Other: Pt with hemoglobin of 6.3 this AM. Not appropriate for therapy per therapy protocol.      Next Scheduled Treatment: Will check back as able or 22    Electronically signed by Eunice Neri OT on 2022 at 9:40 AM

## 2022-06-08 NOTE — PROGRESS NOTES
[facility]  Internal Medicine Teaching Residency Program  Inpatient Daily Progress Note  ______________________________________________________________________________    Patient: Emil Solomon [facility]  YOB: 1965   PETERSON:[id number]    Acct: [de-identified]     Room: [address] [place] [address]  Admit date: 6/3/2022  Today's date: 06/08/22  Number of days in the hospital: 3    SUBJECTIVE   Admitting Diagnosis: Anemia  CC: . Chief Complaint   Patient presents with    Fatigue       Pt examined at bedside. Chart & results reviewed. No acute events overnight. Patient states her abdominal pain has resolved. Patient still complains of back pain. Patient is afebrile and is hemodynamically stable with /63 and pulse 108. Saturating 94% on room air. Her left leg swelling has decreased. She received 2 doses of IV Lasix 20. Labs reviewed: WBC trended down to 13.9 from 17.8. Blood culture negative so far. Follow up urine and respiratory culture. Hypokalemia with potassium 3.6 - Replaced with 40 meq oral.  Hemoglobin up to 8.2 status post 2 units PRBC. Heme-onc on board recommend transfusion to keep hemoglobin above 7. STOOL OCCULT BLOOD POSITIVE   slightly improved, no plans for chemotherapy while in house. Continue IV iron course and symptomatic supportive care. ROS:  Constitutional:  negative for chills, fevers, sweats  Respiratory:  negative for cough, dyspnea on exertion, hemoptysis, shortness of breath, wheezing  Cardiovascular:  negative for chest pain, chest pressure/discomfort, lower extremity edema, palpitations  Gastrointestinal:  Negative for abdominal pain. Negative for constipation, diarrhea, nausea, vomiting  Neurological:  negative for dizziness, headache  BRIEF HISTORY     The patient is a pleasant 59 y.o. female  with significant past medical history of stage IV ovarian cancer currently on chemotherapy presents with fatigue and generalized weakness. Patient also had constipation for over past 4 days since admission. She denied nausea, vomiting but did complain of mild abdominal pain in the periumbilical region of  intensity for a few months.     Patient states that she has missed her chemotherapy on 6/3/2022 due to generalized weakness. On arrival to the ED hemoglobin was checked and it was 6.3 and she received 1 unit PRBC. She was noted to be tachycardic at the time. CT abdomen pelvis showed colonic distention consistent with chronic ileus versus chronic obstruction. Patient was initially placed in observation unit for GI consult and now has been admitted to internal medicine service for further management.       GI suggested that the chronic sigmoid stricture is not traversable by colonoscopy and advised to continue on stool softeners with intermittent use of MiraLAX. GI signed off.     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     Echo from 2022 shows LVEF of 55%. On evaluation, patient denied headache, cough, chest pain, numbness, weakness, shortness of breath, abdominal pain or urinary symptoms. Patient  is afebrile and hemodynamically stable with /65, heart rate 107. She is saturating well on 2 L nasal cannula.     Initial hemoglobin was 6.3 after which she received 1 unit PRBC and it trended up to 8.1. Hemoglobin trended down to 7.3 and 6.8 later with another unit of PRBC transfusion. Hb now trended up to 8.2. Stool occult positive from 2022. No intervention as per GI.     OBJECTIVE     Vital Signs:  /66   Pulse (!) 107   Temp 98.5 °F (36.9 °C) (Oral)   Resp 15   Wt 160 lb (72.6 kg)   SpO2 93%   BMI 26.63 kg/m²     Temp (24hrs), Av.4 °F (36.9 °C), Min:98.3 °F (36.8 °C), Max:98.5 °F (36.9 °C)    In: 760   Out: 900 [Urine:900]    Physical Exam:  Constitutional: This is a well developed, well nourished, 25-29.9 - Overweight 59y.o. year old female who is alert, oriented, cooperative and in no apparent distress. Head:normocephalic and atraumatic. EENT:  PERRLA. No conjunctival injections. Septum was midline, mucosa was without erythema, exudates or cobblestoning. No thrush was noted. Neck: Supple without thyromegaly. No elevated JVP. Trachea was midline. Respiratory: Chest was symmetrical without dullness to percussion. Breath sounds bilaterally were clear to auscultation. There were no wheezes, rhonchi or rales. There is no intercostal retraction or use of accessory muscles. No egophony noted. Cardiovascular: Regular without murmur, clicks, gallops or rubs. Abdomen: Slightly rounded and soft without organomegaly. No rebound, rigidity or guarding was appreciated. Lymphatic: No lymphadenopathy. Musculoskeletal: Normal curvature of the spine. No gross muscle weakness. Extremities: Bilateral lower extremity pitting edema LLE> RLE. No involuntary movements are noted. Skin:  Warm and dry. Good color, turgor and pigmentation. No lesions or scars.   No cyanosis or clubbing  Neurological/Psychiatric: The patient's general behavior, level of consciousness, thought content and emotional status is normal.        Medications:  Scheduled Medications:    morphine  30 mg Oral BID    morphine  15 mg Oral BID    apixaban  5 mg Oral BID    hydrocortisone   Rectal BID    lamoTRIgine  125 mg Oral BID    levETIRAcetam  1,000 mg Oral BID    pantoprazole  40 mg Oral Daily    QUEtiapine  25 mg Oral Nightly    senna  1 tablet Oral BID    sertraline  100 mg Oral Daily    docusate sodium  100 mg Oral Daily    sodium chloride flush  5-40 mL IntraVENous 2 times per day     Continuous Infusions:    sodium chloride      sodium chloride      sodium chloride Stopped (06/05/22 8751)     PRN MedicationsoxyCODONE-acetaminophen, 1 tablet, Q4H PRN  LORazepam, 1 mg, Q8H PRN  sodium chloride, , PRN  bisacodyl, 10 mg, Daily PRN  bisacodyl, 10 mg, TID PRN  sodium chloride, , PRN  benzonatate, 100 mg, TID PRN  melatonin, 5 mg, Nightly PRN  ondansetron, 4 mg, Q8H PRN  sodium chloride flush, 5-40 mL, PRN  sodium chloride, , PRN  acetaminophen, 650 mg, Q4H PRN  ondansetron, 4 mg, Q8H PRN   Or  ondansetron, 4 mg, Q6H PRN        Diagnostic Labs:  CBC:   Recent Labs     06/06/22  0812 06/07/22  1151 06/08/22  0441   WBC 14.8* 17.8* 13.9*   RBC 3.12* 2.85* 2.79*   HGB 8.8* 8.4* 8.2*   HCT 29.7* 27.8* 27.2*   MCV 95.2 97.5 97.5   RDW 19.6* 21.0* 22.0*   * 534* 504*     BMP:   Recent Labs     06/05/22  0850 06/07/22  0859 06/08/22  0441    133* 138   K 3.5* 3.9 3.6*    104 105   CO2 22 23 23   BUN 12 10 11   CREATININE 0.40* 0.37* 0.31*     BNP: No results for input(s): BNP in the last 72 hours. PT/INR: No results for input(s): PROTIME, INR in the last 72 hours. APTT: No results for input(s): APTT in the last 72 hours. CARDIAC ENZYMES: No results for input(s): CKMB, CKMBINDEX, TROPONINI in the last 72 hours. Invalid input(s): CKTOTAL;3  FASTING LIPID PANEL:  Lab Results   Component Value Date    CHOL 131 03/10/2021    HDL 33 (L) 03/10/2021    TRIG 147 03/10/2021     LIVER PROFILE:   No results for input(s): AST, ALT, ALB, BILIDIR, BILITOT, ALKPHOS in the last 72 hours. MICROBIOLOGY:   Lab Results   Component Value Date/Time    CULTURE NO GROWTH 12 HOURS 06/07/2022 04:43 PM    CULTURE NO GROWTH 12 HOURS 06/07/2022 04:43 PM       Imaging:    CT ABDOMEN PELVIS W IV CONTRAST Additional Contrast? None    Result Date: 6/3/2022  Worsened diffuse colonic distension with stool and air-filled colon and wall thickening. Suggestion of a segment of narrowing of the rectosigmoid colon redemonstrated, similar or slightly improved from prior study.   Chronic colonic obstruction/stricture of versus [place] syndrome are considerations. Correlate with direct visualization if indicated. Unchanged masslike partially calcified lesion in the pelvis may be compatible with known history of malignancy or post treatment changes in this location. mild improved left inguinal lymphadenopathy, otherwise stable retroperitoneal lymphadenopathy with some partially calcified lymph nodes. Worsened subcutaneous soft tissue edema most significant at the left lower proximal thigh could be related to lymphedema. Stable osseous metastatic disease. XR CHEST PORTABLE    Result Date: 6/3/2022  Stable support line Mild prominence of interstitial markings suggests mild vascular congestion       ASSESSMENT & PLAN     ASSESSMENT / PLAN:     Principal Problem:    Anemia  Active Problems:    Iron deficiency    Seizure (HCC)    Fatigue    Chronic deep vein thrombosis (DVT) of femoral vein of left lower extremity (HCC)    Gynecologic cancer (HCC)  Resolved Problems:    Constipation      1. Severe anemia related to chemotherapy/myelosuppression at the time of anemia of chronic disease/cancer   - Fatigue and weakness secondary to anemia.  - Initial hemoglobin was 6.3 after which she received 1 unit PRBC on 6/3/2022 and it trended up to 8.1. Hemoglobin trended down to 7.3 and 6.8 on 6/5/2022 for which she received second unit of PRBC. -  Stool occult blood positive. GI consulted and they said she is not a candidate for colonoscopy due to stricture. Her Hb is stable. Patient to follow up with primary GI outpatient. - Monitor H and H.   - Transfuse if Hb < 7.     2. Iron deficiency   - Iron level 8 on 6/4/2022. Normal MCV (in contrast to low MCV) and elevated RDW. - Placed on VENOFER  mg every 24 hours for 2 doses.     3. Constipation   - Resolved  -  CT abdomen pelvis showed colonic distention consistent with chronic ileus versus chronic obstruction.    - GI consulted who suggested that patient has a  chronic sigmoid stricture not traversable by colonoscopy and advised to continue on stool softeners with intermittent use of MiraLAX.     4. History of seizure disorder   - On KEPPRA 1 gm oral BID and LAMICTAL 125 mg oral BID.     5. Chronic DVT of left femoral vein  - On ELIQUIS 5 mgm oral BID.     6. Stage IV metastatic ovarian cancer   - Hem onc on board. Follow up recommendations. - slightly improved, [facility]  - On ELIQUIS for anticoagulation. 7.  Lower extremity pitting edema LLE> RLE  -Likely secondary to ovarian mass compressing vein/previous DVT of left femoral vein. -IV Lasix 20 mg 1 dose given yesterday. No much improvement. 8. Leukocytosis  - Likely secondary to Neupogen if she received it during chemotherapy. We will check with hematology. - Follow up pan cultures to rule out infection.        DVT ppx: On ELIQUIS 5 mg oral BID  GI ppx: Protonix 40 mg oral daily     PT/OT/SW: Consulted  Discharge Planning:  to assist with    Shayy Elkins MD  Internal Medicine Resident, PGY-1  [place];  [place], [place]  6/8/2022, 7:42 AM

## 2022-06-08 NOTE — PROGRESS NOTES
Physical Therapy        Physical Therapy Cancel Note      DATE: 2022    NAME: Josi Clements  MRN: [id number]   : 1965      Patient not seen this date for Physical Therapy due to:    Patient Declined: Stating she feeling nauseous and tired from working with OT      Electronically signed by Chauncey Van PTA on 2022 at 3:18 PM

## 2022-06-09 PROBLEM — E87.6 HYPOKALEMIA: Status: RESOLVED | Noted: 2022-02-21 | Resolved: 2022-06-09

## 2022-06-09 PROBLEM — Z71.89 ACP (ADVANCE CARE PLANNING): Status: ACTIVE | Noted: 2020-08-21

## 2022-06-09 LAB
ABSOLUTE EOS #: 0.33 K/UL (ref 0–0.44)
ABSOLUTE IMMATURE GRANULOCYTE: 0.22 K/UL (ref 0–0.3)
ABSOLUTE LYMPH #: 1.55 K/UL (ref 1.1–3.7)
ABSOLUTE MONO #: 1.33 K/UL (ref 0.1–1.2)
ANION GAP SERPL CALCULATED.3IONS-SCNC: 9 MMOL/L (ref 9–17)
BASOPHILS # BLD: 1 % (ref 0–2)
BASOPHILS ABSOLUTE: 0.11 K/UL (ref 0–0.2)
BUN BLDV-MCNC: 10 MG/DL (ref 6–20)
CALCIUM SERPL-MCNC: 7.7 MG/DL (ref 8.6–10.4)
CHLORIDE BLD-SCNC: 105 MMOL/L (ref 98–107)
CO2: 23 MMOL/L (ref 20–31)
CREAT SERPL-MCNC: 0.31 MG/DL (ref 0.5–0.9)
EOSINOPHILS RELATIVE PERCENT: 3 % (ref 1–4)
GFR AFRICAN AMERICAN: >60 ML/MIN
GFR NON-AFRICAN AMERICAN: >60 ML/MIN
GFR SERPL CREATININE-BSD FRML MDRD: ABNORMAL ML/MIN/{1.73_M2}
GLUCOSE BLD-MCNC: 74 MG/DL (ref 70–99)
HCT VFR BLD CALC: 28.8 % (ref 36.3–47.1)
HEMOGLOBIN: 8.4 G/DL (ref 11.9–15.1)
IMMATURE GRANULOCYTES: 2 %
LYMPHOCYTES # BLD: 14 % (ref 24–43)
MCH RBC QN AUTO: 28.8 PG (ref 25.2–33.5)
MCHC RBC AUTO-ENTMCNC: 29.2 G/DL (ref 28.4–34.8)
MCV RBC AUTO: 98.6 FL (ref 82.6–102.9)
MONOCYTES # BLD: 12 % (ref 3–12)
MORPHOLOGY: ABNORMAL
MORPHOLOGY: ABNORMAL
NRBC AUTOMATED: 0 PER 100 WBC
PDW BLD-RTO: 21.8 % (ref 11.8–14.4)
PLATELET # BLD: 467 K/UL (ref 138–453)
PMV BLD AUTO: 8.8 FL (ref 8.1–13.5)
POTASSIUM SERPL-SCNC: 3.8 MMOL/L (ref 3.7–5.3)
RBC # BLD: 2.92 M/UL (ref 3.95–5.11)
SEG NEUTROPHILS: 68 % (ref 36–65)
SEGMENTED NEUTROPHILS ABSOLUTE COUNT: 7.56 K/UL (ref 1.5–8.1)
SODIUM BLD-SCNC: 137 MMOL/L (ref 135–144)
WBC # BLD: 11.1 K/UL (ref 3.5–11.3)

## 2022-06-09 PROCEDURE — 85025 COMPLETE CBC W/AUTO DIFF WBC: CPT

## 2022-06-09 PROCEDURE — 99232 SBSQ HOSP IP/OBS MODERATE 35: CPT | Performed by: INTERNAL MEDICINE

## 2022-06-09 PROCEDURE — 6370000000 HC RX 637 (ALT 250 FOR IP): Performed by: STUDENT IN AN ORGANIZED HEALTH CARE EDUCATION/TRAINING PROGRAM

## 2022-06-09 PROCEDURE — 2580000003 HC RX 258: Performed by: STUDENT IN AN ORGANIZED HEALTH CARE EDUCATION/TRAINING PROGRAM

## 2022-06-09 PROCEDURE — 36415 COLL VENOUS BLD VENIPUNCTURE: CPT

## 2022-06-09 PROCEDURE — 99232 SBSQ HOSP IP/OBS MODERATE 35: CPT | Performed by: NURSE PRACTITIONER

## 2022-06-09 PROCEDURE — 6370000000 HC RX 637 (ALT 250 FOR IP)

## 2022-06-09 PROCEDURE — 80048 BASIC METABOLIC PNL TOTAL CA: CPT

## 2022-06-09 PROCEDURE — 6360000002 HC RX W HCPCS

## 2022-06-09 PROCEDURE — 97110 THERAPEUTIC EXERCISES: CPT

## 2022-06-09 PROCEDURE — 97530 THERAPEUTIC ACTIVITIES: CPT

## 2022-06-09 PROCEDURE — 1200000000 HC SEMI PRIVATE

## 2022-06-09 RX ORDER — POLYETHYLENE GLYCOL 3350 17 G/17G
17 POWDER, FOR SOLUTION ORAL 2 TIMES DAILY
Qty: 527 G | Refills: 1 | Status: SHIPPED | OUTPATIENT
Start: 2022-06-09 | End: 2022-07-09

## 2022-06-09 RX ORDER — PROCHLORPERAZINE EDISYLATE 5 MG/ML
10 INJECTION INTRAMUSCULAR; INTRAVENOUS EVERY 6 HOURS PRN
Status: DISCONTINUED | OUTPATIENT
Start: 2022-06-09 | End: 2022-06-13 | Stop reason: HOSPADM

## 2022-06-09 RX ORDER — BISACODYL 10 MG
10 SUPPOSITORY, RECTAL RECTAL DAILY PRN
Qty: 30 SUPPOSITORY | Refills: 0 | Status: SHIPPED | OUTPATIENT
Start: 2022-06-09 | End: 2022-07-09

## 2022-06-09 RX ORDER — POLYETHYLENE GLYCOL 3350 17 G/17G
17 POWDER, FOR SOLUTION ORAL 2 TIMES DAILY
Status: DISCONTINUED | OUTPATIENT
Start: 2022-06-09 | End: 2022-06-13 | Stop reason: HOSPADM

## 2022-06-09 RX ADMIN — LEVETIRACETAM 1000 MG: 500 TABLET, FILM COATED ORAL at 09:02

## 2022-06-09 RX ADMIN — POLYETHYLENE GLYCOL 3350 17 G: 17 POWDER, FOR SOLUTION ORAL at 21:53

## 2022-06-09 RX ADMIN — OXYCODONE HYDROCHLORIDE AND ACETAMINOPHEN 1 TABLET: 5; 325 TABLET ORAL at 22:28

## 2022-06-09 RX ADMIN — PROCHLORPERAZINE EDISYLATE 10 MG: 5 INJECTION INTRAMUSCULAR; INTRAVENOUS at 22:34

## 2022-06-09 RX ADMIN — SODIUM CHLORIDE, PRESERVATIVE FREE 10 ML: 5 INJECTION INTRAVENOUS at 21:54

## 2022-06-09 RX ADMIN — LORAZEPAM 1 MG: 1 TABLET ORAL at 18:30

## 2022-06-09 RX ADMIN — MORPHINE SULFATE 30 MG: 15 TABLET, FILM COATED, EXTENDED RELEASE ORAL at 09:04

## 2022-06-09 RX ADMIN — POLYETHYLENE GLYCOL 3350 17 G: 17 POWDER, FOR SOLUTION ORAL at 11:25

## 2022-06-09 RX ADMIN — PROCHLORPERAZINE EDISYLATE 10 MG: 5 INJECTION INTRAMUSCULAR; INTRAVENOUS at 15:10

## 2022-06-09 RX ADMIN — LORAZEPAM 1 MG: 1 TABLET ORAL at 09:04

## 2022-06-09 RX ADMIN — MORPHINE SULFATE 30 MG: 15 TABLET, FILM COATED, EXTENDED RELEASE ORAL at 21:52

## 2022-06-09 RX ADMIN — APIXABAN 5 MG: 5 TABLET, FILM COATED ORAL at 21:52

## 2022-06-09 RX ADMIN — OXYCODONE HYDROCHLORIDE AND ACETAMINOPHEN 1 TABLET: 5; 325 TABLET ORAL at 18:30

## 2022-06-09 RX ADMIN — LAMOTRIGINE 125 MG: 100 TABLET ORAL at 09:02

## 2022-06-09 RX ADMIN — SODIUM CHLORIDE, PRESERVATIVE FREE 10 ML: 5 INJECTION INTRAVENOUS at 09:11

## 2022-06-09 RX ADMIN — PANTOPRAZOLE SODIUM 40 MG: 40 TABLET, DELAYED RELEASE ORAL at 09:02

## 2022-06-09 RX ADMIN — LAMOTRIGINE 125 MG: 100 TABLET ORAL at 21:52

## 2022-06-09 RX ADMIN — SERTRALINE 100 MG: 50 TABLET, FILM COATED ORAL at 09:02

## 2022-06-09 RX ADMIN — DOCUSATE SODIUM 100 MG: 100 CAPSULE ORAL at 09:03

## 2022-06-09 RX ADMIN — OXYCODONE HYDROCHLORIDE AND ACETAMINOPHEN 1 TABLET: 5; 325 TABLET ORAL at 14:24

## 2022-06-09 RX ADMIN — MORPHINE SULFATE 15 MG: 15 TABLET, FILM COATED, EXTENDED RELEASE ORAL at 21:53

## 2022-06-09 RX ADMIN — APIXABAN 5 MG: 5 TABLET, FILM COATED ORAL at 09:03

## 2022-06-09 RX ADMIN — OXYCODONE HYDROCHLORIDE AND ACETAMINOPHEN 1 TABLET: 5; 325 TABLET ORAL at 08:13

## 2022-06-09 RX ADMIN — QUETIAPINE FUMARATE 25 MG: 25 TABLET ORAL at 21:52

## 2022-06-09 RX ADMIN — SODIUM CHLORIDE, PRESERVATIVE FREE 10 ML: 5 INJECTION INTRAVENOUS at 22:34

## 2022-06-09 RX ADMIN — LEVETIRACETAM 1000 MG: 500 TABLET, FILM COATED ORAL at 21:52

## 2022-06-09 RX ADMIN — ONDANSETRON 4 MG: 4 TABLET, ORALLY DISINTEGRATING ORAL at 09:50

## 2022-06-09 RX ADMIN — SENNOSIDES 8.6 MG: 8.6 TABLET, COATED ORAL at 09:02

## 2022-06-09 RX ADMIN — MORPHINE SULFATE 15 MG: 15 TABLET, FILM COATED, EXTENDED RELEASE ORAL at 09:11

## 2022-06-09 ASSESSMENT — PAIN DESCRIPTION - PAIN TYPE: TYPE: CHRONIC PAIN

## 2022-06-09 ASSESSMENT — PAIN DESCRIPTION - FREQUENCY
FREQUENCY: CONTINUOUS
FREQUENCY: CONTINUOUS

## 2022-06-09 ASSESSMENT — PAIN DESCRIPTION - ORIENTATION
ORIENTATION: LOWER;RIGHT
ORIENTATION: LOWER;RIGHT
ORIENTATION: LOWER;RIGHT;LEFT

## 2022-06-09 ASSESSMENT — PAIN DESCRIPTION - DESCRIPTORS
DESCRIPTORS: ACHING;CRAMPING;DISCOMFORT
DESCRIPTORS: ACHING;DISCOMFORT;CRAMPING

## 2022-06-09 ASSESSMENT — PAIN SCALES - GENERAL
PAINLEVEL_OUTOF10: 6
PAINLEVEL_OUTOF10: 6
PAINLEVEL_OUTOF10: 5
PAINLEVEL_OUTOF10: 8
PAINLEVEL_OUTOF10: 8
PAINLEVEL_OUTOF10: 6
PAINLEVEL_OUTOF10: 8
PAINLEVEL_OUTOF10: 6

## 2022-06-09 ASSESSMENT — PAIN DESCRIPTION - LOCATION
LOCATION: ABDOMEN;BACK
LOCATION: ABDOMEN

## 2022-06-09 ASSESSMENT — PAIN - FUNCTIONAL ASSESSMENT: PAIN_FUNCTIONAL_ASSESSMENT: ACTIVITIES ARE NOT PREVENTED

## 2022-06-09 NOTE — PROGRESS NOTES
Palliative Care Progress Note    NAME:  Mike Mccauley [facility]  MEDICAL RECORD NUMBER:  [id number]  AGE: 59 y.o. GENDER: female  : 1965  TODAY'S DATE:  2022    Reason for Consult:  symptom management, goals of care, code status discussion, and support. History of Present Illness     The patient is a 59 y.o. Non- / non  female who presents with Fatigue      Referred to Palliative Care by  [x] Physician   [] Nursing  [] Family Request   [] Other:       She was admitted to the primary service for Anemia [D64.9]  Anemia, unspecified type [D64.9]. Her hospital course has been associated with Anemia related to chemotherapy/myelosuppression s/p transfusion x2, TRACIE, constipation with possible obstruction, thrombocytosis, leukocytosis, positive occult blood, chronic DVT LLE on AC, and stage IV metastatic ovarian cancer. The patient has a complicated medical history and has been hospitalized since 6/3/2022  8:21 AM. GI reported that patient not a candidate for colonoscopy d/t stricture. Patient had BM. She is awaiting SNF. Patient is a full code. Palliative care following for review of goals, code status, and support. OVERNIGHT EVENTS: No overnight events reported. Patient is no longer nauseated and is eating.  pertinent labs include; Hgb 8.4, Plts 467,     VITALS   BP (!) 106/55   Pulse (!) 105   Temp 98.1 °F (36.7 °C) (Oral)   Resp 16   Wt 160 lb (72.6 kg)   SpO2 95%   BMI 26.63 kg/m²     PAST MEDICAL HISTORY  Past Medical History:   Diagnosis Date    Anemia     Bleeding 10/2020    intra-abdominal bleeding -due to splenic mass with GI infiltration. Status post embolization    Cervical cancer ([address]     Depression     Diabetes mellitus ([address]     GERD (gastroesophageal reflux disease)     Hx of blood clots     Hypertension     Metastatic cancer ([address] 10/2020    extensive intraabdominal and splenic involvement and lung mets.     Ovarian cancer (HCC)     low grade serous ovarian carcinoma    Post chemo evaluation     2007: Chemo via med onc (Dr. Mo Be), 2008: Riley Piper due to rising CA-125, 2013: intraperitoneal chemo,12/2015: Ca125 - 25     Splenic lesion         SURGICAL HISTORY  Past Surgical History:   Procedure Laterality Date    ABCESS DRAINAGE  2013    Franca rectal    ANUS SURGERY      ANAL FISSURECTOMY    CARDIAC CATHETERIZATION      COLECTOMY  03/2013    ex lap, tumor debulking, transverse colectomy w reanastamosis, subgastric omentectomy, intraperitoneal port placement    IR EMBOLIZATION HEMORRHAGE  10/05/2020    intra-abdominal bleeding -due to splenic mass with GI infiltration. Status post embolization [employer] interlock coils x7. mri condtional 3t ok, safe immediately post implant.     IR PORT PLACEMENT EQUAL OR GREATER THAN 5 YEARS  08/24/2020    IR PORT PLACEMENT EQUAL OR GREATER THAN 5 YEARS 8/24/2020 Vilma Lazar MD [facility]    PORT SURGERY      IP Port    TONSILLECTOMY      TOTAL ABDOMINAL HYSTERECTOMY          FAMILY HISTORY  Family History   Problem Relation Age of Onset    Alcohol Abuse Mother     Cirrhosis Mother         SOCIAL HISTORY  Social History     Tobacco History     Smoking Status  Current Every Day Smoker Smoking Frequency  1 pack/day for 20 years (20 pk yrs) Smoking Tobacco Type  Cigarettes    Smokeless Tobacco Use  Never Used          Alcohol History     Alcohol Use Status  Not Currently          Drug Use     Drug Use Status  Never          Sexual Activity     Sexually Active  Not Asked                 Assessment        REVIEW OF SYSTEMS    []   UNABLE TO OBTAIN:     Constitutional:  []   Chills   [x]  Fatigue   []  Fevers   []  Malaise   []  Weight loss   [] Other:     Respiratory:   []  Cough    []  Shortness of breath    []  Chest pain    [] Other:     Cardiovascular:   []  Chest pain  []  Dyspnea    []  Exertional chest pressure/discomfort     [] Fatigue      []  Palpitations    []  Syncope   [] Other: Gastrointestinal:   []  Abdominal pain   []  Constipation    []  Diarrhea    []   Dysphagia   []  Reflux             []  Vomiting   [x] Other: Appetite improving/no N/v    Genitourinary:  []  Dysuria     []  Frequency   []  Hematuria   [] Nocturia   []  Urinary incontinence   [] Other:     Musculoskeletal:   [] Back pain    [x]  Muscle weakness   []  Myalgias    []  Neck pain   []  Stiff joints   []  Other:     Behavioral/Psych:   [] Anxiety    []  Depression     []  Mood swings   [] Other:     PHYSICAL ASSESSMENT:     General: [x]  Oriented x3      [] well appearing      [] Intubated      [] ill appearing      [x] Other: Flat affect    Mental Status: [x] normal mental status exam      [] drowsy      [] Confused      [] Other:     Cardiovascular: [x]  Regular rate/rhythm      [] Arrhythmia      [] Other:     Chest: [x] Effort normal      [] lungs clear      [] respiratory distress      [] Tachypnea      []  Other:    Abdomen: [x] Soft/non-tender      []  Normal appearance      [] Distended      [] Ascites      [] Other:    Neurological: [x] Normal Speech      [] Normal Sensation      []  Deficits present:      Extremity:  [x] normal skin color/temp      [] clubbing/cyanosis      []  No edema      [] Other:     Palliative Performance Scale:  ___60%  Ambulation reduced; Significant disease; Can't do hobbies/housework; intake normal or reduced; occasional assist; LOC full/confusion  _x__50%  Mainly sit/lie; Extensive disease; Can't do any work; Considerable assist; intake normal or reduced; LOC full/confusion  ___40%  Mainly in bed; Extensive disease; Mainly assist; intake normal or reduced; LOC full/confusion   ___30%  Bed Bound; Extensive disease; Total care; intake reduced; LOCfull/confusion  ___20%  Bed Bound; Extensive disease; Total care; intake minimal; Drowsy/coma  ___10%  Bed Bound; Extensive disease;  Total care; Mouth care only; Drowsy/coma  ___0       Death      Plan      Palliative Interaction: I went care are live longer, improve or maintain function/quality of life and preserve independence/autonomy/control   Goals of care discussed with:    [x] Patient independently    [] Patient and Family    [] Family or Healthcare DPOA independently    [] Unable to discuss with patient, family/DPOA not present    3- Code Status  Full Code    4- Other recommendations  - We will continue to provide comfort and support to the patient and the family    Please call with any palliative questions or concerns. Palliative Care Team is available via perfect serve or via phone. Palliative Care will continue to follow Ms. Garcia's care as needed. The note has been dictated by dragon, typing errors may be a possibility     Thank you for allowing Palliative Care to participate in the care of Ms. Garcia . Electronically signed by   ESTRELLITA Hackett CNP  Palliative Care Team  on 6/9/2022 at 10:48 AM    Palliative care can be reached via [facility].

## 2022-06-09 NOTE — PROGRESS NOTES
assistance  Sit to Supine: Contact guard assistance  Bed Mobility Comments: HOB elevated ~30 degrees. no use of rails. Pt became very emotional upon seating at EOB; tolerated ~20mins at EOB   Balance  Posture: Good  Sitting - Static: Good  Sitting - Dynamic: Good  Exercise Treatment: Seated LE exercise program: Long Arc Quads, hip abduction/adduction, heel/toe raises, and marches. Reps: 15     AM-PAC Score     AM-PAC Inpatient Mobility without Stair Climbing Raw Score : 15 (06/07/22 1447)  AM-PAC Inpatient without Stair Climbing T-Scale Score : 43.03 (06/07/22 1447)  Mobility Inpatient CMS 0-100% Score: 47.43 (06/07/22 1447)  Mobility Inpatient without Stair CMS G-Code Modifier : CK (06/07/22 1447)       Goals  Short Term Goals  Time Frame for Short term goals: 5 visits  Short term goal 1: Pt to ambualte 25 ft SBA with RW  Short term goal 2: Pt to complete functional transfers mod I with rolling walker  Long Term Goals  Time Frame for Long term goals : 14 visits  Long term goal 1: Pt to ambulate 100 ft with RW mod I  Long term goal 2: Pt to ascend /descend 5 steps mod I  Patient Goals   Patient goals : to walk and gain strength       Education  Patient Education  Education Given To: Patient  Education Provided: Role of Therapy;Plan of Care;Precautions;Transfer Training;Energy Conservation;Equipment; Fall Prevention Strategies  Education Provided Comments: Pt educated in use of rolling walker, safety with transfers and LE ROM/positioning  Education Method: Demonstration;Verbal  Barriers to Learning: None  Education Outcome: Verbalized understanding      Therapy Time   Individual Concurrent Group Co-treatment   Time In 1028         Time Out 1052         Minutes 24         Timed Code Treatment Minutes: [address], PTA

## 2022-06-09 NOTE — FLOWSHEET NOTE
SPIRITUAL CARE DEPARTMENT - [facility]  PROGRESS NOTE    Shift date: 2022  Shift day: Thursday   Shift # 1    Room # 9107/1753-58   Name: Radha Glasgow                Synagogue:    Place of Orthodox:     Referral: Routine Visit, palliative care pt    Admit Date & Time: 6/3/2022  8:21 AM    Assessment:  Radha Glasgow is a 59 y.o. female with ovarian cancer. Upon entering the room writer observes patient sitting in bed watching TV. She appeared disinterested at first in talking with  but soon began to engage in conversation. She spoke of her illness, her previous work, and her children. One son also has a cancer diagnosis. Pt had a son who  a few years ago. She also has two daughters, one who lives in [facility], and one local whose name is Nii Cuenca. She stated that Nii Cuenca is a good support to her. Intervention:  Writer introduced self and title as  Writer offered space for patient  to express feelings, needs, and concerns and provided a ministry presence.  assured pt of prayers. Outcome:  Patient expressed appreciation for visit. Plan:  Chaplains will remain available to offer spiritual and emotional support as needed.       Electronically signed by Eunice Lance on 2022 at 12:24 PM.  [facility]  [phone]       22 1223   Encounter Summary   Service Provided For: Patient   Referral/Consult From: Karl   Last Encounter  22   Complexity of Encounter Moderate   Begin Time 1145   End Time  1205   Total Time Calculated 20 min   Spiritual/Emotional needs   Type Spiritual Support   Assessment/Intervention/Outcome   Assessment Calm;Coping   Intervention Active listening;Explored/Affirmed feelings, thoughts, concerns;Explored Coping Skills/Resources;Prayer (assurance of)/Isabella   Outcome Engaged in conversation;Expressed feelings, needs, and concerns;Expressed Gratitude;Receptive  (Tearful)

## 2022-06-09 NOTE — PLAN OF CARE
Problem: Chronic Conditions and Co-morbidities  Goal: Patient's chronic conditions and co-morbidity symptoms are monitored and maintained or improved  6/9/2022 0424 by Reji Marie RN  Outcome: Progressing  6/8/2022 1858 by Ritesh Beatty RN  Outcome: Progressing  Flowsheets (Taken 6/8/2022 0800)  Care Plan - Patient's Chronic Conditions and Co-Morbidity Symptoms are Monitored and Maintained or Improved: Monitor and assess patient's chronic conditions and comorbid symptoms for stability, deterioration, or improvement     Problem: Pain  Goal: Verbalizes/displays adequate comfort level or baseline comfort level  6/9/2022 0424 by Reji Marie RN  Outcome: Progressing  6/8/2022 1858 by Ritesh Beatty RN  Outcome: Progressing     Problem: Skin/Tissue Integrity  Goal: Absence of new skin breakdown  Description: 1. Monitor for areas of redness and/or skin breakdown  2. Assess vascular access sites hourly  3. Every 4-6 hours minimum:  Change oxygen saturation probe site  4. Every 4-6 hours:  If on nasal continuous positive airway pressure, respiratory therapy assess nares and determine need for appliance change or resting period.   6/9/2022 0424 by Reji Marie RN  Outcome: Progressing  6/8/2022 1858 by Ritesh Beatty RN  Outcome: Progressing     Problem: Safety - Adult  Goal: Free from fall injury  6/9/2022 0424 by Reji Marie RN  Outcome: Progressing  6/8/2022 1858 by Ritesh Beatty RN  Outcome: Progressing  Flowsheets (Taken 6/8/2022 0800)  Free From Fall Injury: Instruct family/caregiver on patient safety     Problem: ABCDS Injury Assessment  Goal: Absence of physical injury  6/9/2022 0424 by Reji Marie RN  Outcome: Progressing  6/8/2022 1858 by Ritesh Beatty RN  Outcome: Progressing  Flowsheets (Taken 6/8/2022 0800)  Absence of Physical Injury: Implement safety measures based on patient assessment     Problem: Hematologic - Adult  Goal: Maintains hematologic stability  6/9/2022 0424 by Ai Padgett, RN  Outcome: Progressing  6/8/2022 1858 by Lee Cornelius RN  Outcome: Progressing  Flowsheets (Taken 6/8/2022 0800)  Maintains hematologic stability: Assess for signs and symptoms of bleeding or hemorrhage

## 2022-06-09 NOTE — CARE COORDINATION
Transitional Planning:  Call to Paul Grullon at [address]. Left vm for call back re: precert. 15:11  Call to Paul Grullon [phone] at [address]. Per Paul Grullon the insurance co was called at 10 am this morning and they did not request any additional information. Precert still pending.

## 2022-06-09 NOTE — PLAN OF CARE
Problem: Chronic Conditions and Co-morbidities  Goal: Patient's chronic conditions and co-morbidity symptoms are monitored and maintained or improved  6/9/2022 1610 by Izzy Munoz RN  Outcome: Progressing  Flowsheets (Taken 6/9/2022 0800)  Care Plan - Patient's Chronic Conditions and Co-Morbidity Symptoms are Monitored and Maintained or Improved: Monitor and assess patient's chronic conditions and comorbid symptoms for stability, deterioration, or improvement  6/9/2022 0424 by Gary Mcclain RN  Outcome: Progressing     Problem: Pain  Goal: Verbalizes/displays adequate comfort level or baseline comfort level  6/9/2022 1610 by Izzy Munoz RN  Outcome: Progressing  6/9/2022 0424 by Gary Mcclain RN  Outcome: Progressing     Problem: Skin/Tissue Integrity  Goal: Absence of new skin breakdown  Description: 1. Monitor for areas of redness and/or skin breakdown  2. Assess vascular access sites hourly  3. Every 4-6 hours minimum:  Change oxygen saturation probe site  4. Every 4-6 hours:  If on nasal continuous positive airway pressure, respiratory therapy assess nares and determine need for appliance change or resting period.   6/9/2022 1610 by Izzy Munoz RN  Outcome: Progressing  6/9/2022 0424 by Gary Mcclain RN  Outcome: Progressing     Problem: Safety - Adult  Goal: Free from fall injury  6/9/2022 1610 by Izzy Munoz RN  Outcome: Progressing  Flowsheets (Taken 6/9/2022 0830)  Free From Fall Injury: Instruct family/caregiver on patient safety  6/9/2022 0424 by Gary Mcclain RN  Outcome: Progressing     Problem: ABCDS Injury Assessment  Goal: Absence of physical injury  6/9/2022 1610 by Izzy Munoz RN  Outcome: Progressing  Flowsheets (Taken 6/9/2022 0830)  Absence of Physical Injury: Implement safety measures based on patient assessment  6/9/2022 0424 by Gary Mcclain RN  Outcome: Progressing     Problem: Hematologic - Adult  Goal: Maintains hematologic stability  6/9/2022 1610 by Mihir Rock RN  Outcome: Progressing  Flowsheets (Taken 6/9/2022 0800)  Maintains hematologic stability: Assess for signs and symptoms of bleeding or hemorrhage  6/9/2022 0424 by Evelia Robles RN  Outcome: Progressing

## 2022-06-09 NOTE — PROGRESS NOTES
[facility]  Internal Medicine Teaching Residency Program  Inpatient Daily Progress Note  ______________________________________________________________________________    Patient: Robyn Jonas [facility]  YOB: 1965   GUP:[id number]    Acct: [de-identified]     Room: [facility]3339-16  Admit date: 6/3/2022  Today's date: 06/09/22  Number of days in the hospital: 4    SUBJECTIVE   Admitting Diagnosis: Anemia  CC: . Chief Complaint   Patient presents with    Fatigue       Pt examined at bedside. Chart & results reviewed. No acute events overnight. Patient had no new complaints today. States she has not eaten yesterday. Her glucose is down to 74. Patient had bowel movements yesterday. Denied abdominal pain, back pain, chest pain, SOB, nausea, vomiting or dizziness. Left leg edema has improved after 2 doses of IV Lasix 20. Patient is afebrile and is hemodynamically stable with /54 and pulse 103. She is saturating 95% on room air. Labs reviewed. BMP is unremarkable. CBC reviewed. Hemoglobin is stable at 8.4 s/p 2 units PRBC. Platelets 136. Labs reviewed: WBC trended down to 11.1 from 17.8. Blood culture negative so far. Follow up urine and respiratory culture. Heme-onc on board recommend transfusion to keep hemoglobin above 7. STOOL OCCULT BLOOD POSITIVE on 6/6/2022   slightly improved, no plans for chemotherapy while in house. ROS:  Constitutional:  negative for chills, fevers, sweats  Respiratory:  negative for cough, dyspnea on exertion, hemoptysis, shortness of breath, wheezing  Cardiovascular:  negative for chest pain, chest pressure/discomfort, lower extremity edema, palpitations  Gastrointestinal:  Negative for abdominal pain.  Negative for constipation, diarrhea, nausea, vomiting  Neurological:  negative for dizziness, headache  BRIEF HISTORY     The patient is a pleasant 59 y.o. female  with significant past medical history of stage IV ovarian cancer currently on chemotherapy presents with fatigue and generalized weakness. Patient also had constipation for over past 4 days since admission. She denied nausea, vomiting but did complain of mild abdominal pain in the periumbilical region of 5/08 intensity for a few months.     Patient states that she has missed her chemotherapy on 6/3/2022 due to generalized weakness. On arrival to the ED hemoglobin was checked and it was 6.3 and she received 1 unit PRBC. She was noted to be tachycardic at the time. CT abdomen pelvis showed colonic distention consistent with chronic ileus versus chronic obstruction. Patient was initially placed in observation unit for GI consult and now has been admitted to internal medicine service for further management.       GI suggested that the chronic sigmoid stricture is not traversable by colonoscopy and advised to continue on stool softeners with intermittent use of MiraLAX. GI signed off.     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     Echo from 2/22/2022 shows LVEF of 55%. On evaluation, patient denied headache, cough, chest pain, numbness, weakness, shortness of breath, abdominal pain or urinary symptoms. Patient  is afebrile and hemodynamically stable with /65, heart rate 107. She is saturating well on 2 L nasal cannula.     Initial hemoglobin was 6.3 after which she received 1 unit PRBC and it trended up to 8.1. Hemoglobin trended down to 7.3 and 6.8 later with another unit of PRBC transfusion. Hb now trended up to 8.2. Stool occult positive from 6/6/2022. No intervention as per GI.     OBJECTIVE     Vital Signs:  BP (!) 106/55   Pulse (!) 103   Temp 98.1 °F (36.7 °C) (Oral)   Resp 16   Wt 160 lb (72.6 kg)   SpO2 95%   BMI 26.63 kg/m²     Temp (24hrs), Av °F (36.7 °C), Min:97.7 °F (36.5 °C), Max:98.2 °F (36.8 °C)    No intake/output data recorded. Physical Exam:  Constitutional: This is a well developed, well nourished, 25-29.9 - Overweight 59y.o. year old female who is alert, oriented, cooperative and in no apparent distress. Head:normocephalic and atraumatic. EENT:  PERRLA. No conjunctival injections. Septum was midline, mucosa was without erythema, exudates or cobblestoning. No thrush was noted. Neck: Supple without thyromegaly. No elevated JVP. Trachea was midline. Respiratory: Chest was symmetrical without dullness to percussion. Breath sounds bilaterally were clear to auscultation. There were no wheezes, rhonchi or rales. There is no intercostal retraction or use of accessory muscles. No egophony noted. Cardiovascular: Regular without murmur, clicks, gallops or rubs. Abdomen: Slightly rounded and soft without organomegaly. No rebound, rigidity or guarding was appreciated. Lymphatic: No lymphadenopathy. Musculoskeletal: Normal curvature of the spine. No gross muscle weakness. Extremities: Bilateral lower extremity pitting edema. Left lower extremity pitting edema has improved from 4+ to 3+. Right lower extremity 2+ pitting edema. No involuntary movements are noted. Skin:  Warm and dry. Good color, turgor and pigmentation. No lesions or scars.   No cyanosis or clubbing  Neurological/Psychiatric: The patient's general behavior, level of consciousness, thought content and emotional status is normal.        Medications:  Scheduled Medications:    morphine  30 mg Oral BID    morphine  15 mg Oral BID    apixaban  5 mg Oral BID    hydrocortisone   Rectal BID    lamoTRIgine  125 mg Oral BID    levETIRAcetam  1,000 mg Oral BID    pantoprazole  40 mg Oral Daily    QUEtiapine  25 mg Oral Nightly    senna  1 tablet Oral BID    sertraline  100 mg Oral Daily    docusate sodium  100 mg Oral Daily    sodium chloride flush 5-40 mL IntraVENous 2 times per day     Continuous Infusions:    sodium chloride      sodium chloride      sodium chloride Stopped (06/05/22 1731)     PRN MedicationsoxyCODONE-acetaminophen, 1 tablet, Q4H PRN  LORazepam, 1 mg, Q8H PRN  sodium chloride, , PRN  bisacodyl, 10 mg, Daily PRN  bisacodyl, 10 mg, TID PRN  sodium chloride, , PRN  benzonatate, 100 mg, TID PRN  melatonin, 5 mg, Nightly PRN  ondansetron, 4 mg, Q8H PRN  sodium chloride flush, 5-40 mL, PRN  sodium chloride, , PRN  acetaminophen, 650 mg, Q4H PRN  ondansetron, 4 mg, Q8H PRN   Or  ondansetron, 4 mg, Q6H PRN        Diagnostic Labs:  CBC:   Recent Labs     06/07/22  1151 06/08/22  0441 06/09/22  0555   WBC 17.8* 13.9* 11.1   RBC 2.85* 2.79* 2.92*   HGB 8.4* 8.2* 8.4*   HCT 27.8* 27.2* 28.8*   MCV 97.5 97.5 98.6   RDW 21.0* 22.0* 21.8*   * 504* 467*     BMP:   Recent Labs     06/07/22  0859 06/08/22  0441 06/09/22  0555   * 138 137   K 3.9 3.6* 3.8    105 105   CO2 23 23 23   BUN 10 11 10   CREATININE 0.37* 0.31* 0.31*     BNP: No results for input(s): BNP in the last 72 hours. PT/INR: No results for input(s): PROTIME, INR in the last 72 hours. APTT: No results for input(s): APTT in the last 72 hours. CARDIAC ENZYMES: No results for input(s): CKMB, CKMBINDEX, TROPONINI in the last 72 hours. Invalid input(s): CKTOTAL;3  FASTING LIPID PANEL:  Lab Results   Component Value Date    CHOL 131 03/10/2021    HDL 33 (L) 03/10/2021    TRIG 147 03/10/2021     LIVER PROFILE:   No results for input(s): AST, ALT, ALB, BILIDIR, BILITOT, ALKPHOS in the last 72 hours. MICROBIOLOGY:   Lab Results   Component Value Date/Time    CULTURE NO GROWTH 1 DAY 06/07/2022 04:43 PM    CULTURE NO GROWTH 1 DAY 06/07/2022 04:43 PM       Imaging:    CT ABDOMEN PELVIS W IV CONTRAST Additional Contrast? None    Result Date: 6/3/2022  Worsened diffuse colonic distension with stool and air-filled colon and wall thickening.   Suggestion of a segment of narrowing of the rectosigmoid colon redemonstrated, similar or slightly improved from prior study. Chronic colonic obstruction/stricture of versus Lisha syndrome are considerations. Correlate with direct visualization if indicated. Unchanged masslike partially calcified lesion in the pelvis may be compatible with known history of malignancy or post treatment changes in this location. mild improved left inguinal lymphadenopathy, otherwise stable retroperitoneal lymphadenopathy with some partially calcified lymph nodes. Worsened subcutaneous soft tissue edema most significant at the left lower proximal thigh could be related to lymphedema. Stable osseous metastatic disease. XR CHEST PORTABLE    Result Date: 6/3/2022  Stable support line Mild prominence of interstitial markings suggests mild vascular congestion       ASSESSMENT & PLAN     ASSESSMENT / PLAN:     Principal Problem:    Anemia  Active Problems:    Iron deficiency    Seizure (HCC)    Fatigue    Chronic deep vein thrombosis (DVT) of femoral vein of left lower extremity (HCC)    Gynecologic cancer (HCC)  Resolved Problems:    Constipation      1. Severe anemia related to chemotherapy/myelosuppression at the time of anemia of chronic disease/cancer   - Fatigue and weakness secondary to anemia.  - Initial hemoglobin was 6.3 after which she received 1 unit PRBC on 6/3/2022 and it trended up to 8.1. Hemoglobin trended down to 7.3 and 6.8 on 6/5/2022 for which she received second unit of PRBC. Currently hemoglobin is stable at 8.4.  -  Stool occult blood positive on 6/6/2022. GI consulted and they said she is not a candidate for colonoscopy due to stricture. Her Hb is stable. Patient to follow up with primary GI outpatient. - Monitor H and H.   - Transfuse if Hb < 7.     2. Iron deficiency   - Iron level 8 on 6/4/2022. Normal MCV (in contrast to low MCV) and elevated RDW.   - Placed on VENOFER  mg every 24 hours for 2 doses.       3. Constipation   - Resolved. -  CT abdomen pelvis showed colonic distention consistent with chronic ileus versus chronic obstruction. - GI consulted who suggested that patient has a  chronic sigmoid stricture not traversable by colonoscopy and advised to continue on stool softeners with intermittent use of MiraLAX.     4. History of seizure disorder   - On KEPPRA 1 gm oral BID and LAMICTAL 125 mg oral BID.     5. Chronic DVT of left femoral vein  -Duplex venous ultrasound of left lower extremity from 5/13/2022 shows finding suggestive of thrombosis within the proximal to mid femoral vein. - On ELIQUIS 5 mgm oral BID. Given that she has risk factor for extension such as active cancer and proximal vein involvement, she needs to be on anticoagulation for 3 to 6 months.     6. Stage IV metastatic ovarian cancer   - Hem onc on board. Follow up recommendations. - slightly improved, no plans for chemotherapy while in-house per hemon.  - On ELIQUIS for anticoagulation. 7.  Lower extremity pitting edema LLE> RLE  -Likely secondary to ovarian mass compressing vein/previous DVT of left femoral vein. -Received 2 doses of IV Lasix 20 mg with improvement in swelling.  -Advised patient to elevate legs, walk around and work with physical and Occupational Therapy. 8. Leukocytosis  -Resolved  - Likely secondary to Neupogen if she received it during chemotherapy. We will check with hematology. - Follow up pan cultures to rule out infection. Blood cultures show no growth so far. Palliative care consulted. Patient is full code. Currently patient is not ready to discuss.        DVT ppx: On ELIQUIS 5 mg oral BID  GI ppx: Protonix 40 mg oral daily     PT/OT/SW: Consulted  Discharge Planning:  to assist with. Patient is accepted at [facility] and is awaiting precert. Jolene Booth MD  Internal Medicine Resident, PGY-1  [facility];  [place], [place]  6/9/2022, 7:19 AM

## 2022-06-10 LAB
ABSOLUTE EOS #: 0.33 K/UL (ref 0–0.44)
ABSOLUTE IMMATURE GRANULOCYTE: 0.22 K/UL (ref 0–0.3)
ABSOLUTE LYMPH #: 1.43 K/UL (ref 1.1–3.7)
ABSOLUTE MONO #: 1.32 K/UL (ref 0.1–1.2)
ANION GAP SERPL CALCULATED.3IONS-SCNC: 7 MMOL/L (ref 9–17)
BASOPHILS # BLD: 1 % (ref 0–2)
BASOPHILS ABSOLUTE: 0.11 K/UL (ref 0–0.2)
BUN BLDV-MCNC: 10 MG/DL (ref 6–20)
CALCIUM SERPL-MCNC: 7.5 MG/DL (ref 8.6–10.4)
CHLORIDE BLD-SCNC: 107 MMOL/L (ref 98–107)
CO2: 24 MMOL/L (ref 20–31)
CREAT SERPL-MCNC: 0.33 MG/DL (ref 0.5–0.9)
EOSINOPHILS RELATIVE PERCENT: 3 % (ref 1–4)
GFR AFRICAN AMERICAN: >60 ML/MIN
GFR NON-AFRICAN AMERICAN: >60 ML/MIN
GFR SERPL CREATININE-BSD FRML MDRD: ABNORMAL ML/MIN/{1.73_M2}
GLUCOSE BLD-MCNC: 86 MG/DL (ref 70–99)
HCT VFR BLD CALC: 26.3 % (ref 36.3–47.1)
HEMOGLOBIN: 7.6 G/DL (ref 11.9–15.1)
IMMATURE GRANULOCYTES: 2 %
LYMPHOCYTES # BLD: 13 % (ref 24–43)
MCH RBC QN AUTO: 28.5 PG (ref 25.2–33.5)
MCHC RBC AUTO-ENTMCNC: 28.9 G/DL (ref 28.4–34.8)
MCV RBC AUTO: 98.5 FL (ref 82.6–102.9)
MONOCYTES # BLD: 12 % (ref 3–12)
MORPHOLOGY: ABNORMAL
NRBC AUTOMATED: 0.2 PER 100 WBC
PDW BLD-RTO: 21.2 % (ref 11.8–14.4)
PLATELET # BLD: 447 K/UL (ref 138–453)
PMV BLD AUTO: 8.8 FL (ref 8.1–13.5)
POTASSIUM SERPL-SCNC: 4 MMOL/L (ref 3.7–5.3)
RBC # BLD: 2.67 M/UL (ref 3.95–5.11)
SEG NEUTROPHILS: 69 % (ref 36–65)
SEGMENTED NEUTROPHILS ABSOLUTE COUNT: 7.59 K/UL (ref 1.5–8.1)
SODIUM BLD-SCNC: 138 MMOL/L (ref 135–144)
WBC # BLD: 11 K/UL (ref 3.5–11.3)

## 2022-06-10 PROCEDURE — 85025 COMPLETE CBC W/AUTO DIFF WBC: CPT

## 2022-06-10 PROCEDURE — 6360000002 HC RX W HCPCS

## 2022-06-10 PROCEDURE — 6370000000 HC RX 637 (ALT 250 FOR IP): Performed by: STUDENT IN AN ORGANIZED HEALTH CARE EDUCATION/TRAINING PROGRAM

## 2022-06-10 PROCEDURE — 1200000000 HC SEMI PRIVATE

## 2022-06-10 PROCEDURE — 80048 BASIC METABOLIC PNL TOTAL CA: CPT

## 2022-06-10 PROCEDURE — 6370000000 HC RX 637 (ALT 250 FOR IP)

## 2022-06-10 PROCEDURE — 99232 SBSQ HOSP IP/OBS MODERATE 35: CPT | Performed by: INTERNAL MEDICINE

## 2022-06-10 PROCEDURE — 36415 COLL VENOUS BLD VENIPUNCTURE: CPT

## 2022-06-10 PROCEDURE — 6360000002 HC RX W HCPCS: Performed by: STUDENT IN AN ORGANIZED HEALTH CARE EDUCATION/TRAINING PROGRAM

## 2022-06-10 PROCEDURE — 2580000003 HC RX 258: Performed by: STUDENT IN AN ORGANIZED HEALTH CARE EDUCATION/TRAINING PROGRAM

## 2022-06-10 RX ORDER — FUROSEMIDE 10 MG/ML
20 INJECTION INTRAMUSCULAR; INTRAVENOUS ONCE
Status: COMPLETED | OUTPATIENT
Start: 2022-06-10 | End: 2022-06-10

## 2022-06-10 RX ADMIN — APIXABAN 5 MG: 5 TABLET, FILM COATED ORAL at 20:22

## 2022-06-10 RX ADMIN — LORAZEPAM 1 MG: 1 TABLET ORAL at 20:24

## 2022-06-10 RX ADMIN — SERTRALINE 100 MG: 50 TABLET, FILM COATED ORAL at 09:11

## 2022-06-10 RX ADMIN — OXYCODONE HYDROCHLORIDE AND ACETAMINOPHEN 1 TABLET: 5; 325 TABLET ORAL at 13:25

## 2022-06-10 RX ADMIN — LORAZEPAM 1 MG: 1 TABLET ORAL at 04:55

## 2022-06-10 RX ADMIN — APIXABAN 5 MG: 5 TABLET, FILM COATED ORAL at 09:09

## 2022-06-10 RX ADMIN — LEVETIRACETAM 1000 MG: 500 TABLET, FILM COATED ORAL at 20:22

## 2022-06-10 RX ADMIN — SODIUM CHLORIDE, PRESERVATIVE FREE 10 ML: 5 INJECTION INTRAVENOUS at 20:21

## 2022-06-10 RX ADMIN — OXYCODONE HYDROCHLORIDE AND ACETAMINOPHEN 1 TABLET: 5; 325 TABLET ORAL at 17:29

## 2022-06-10 RX ADMIN — MORPHINE SULFATE 15 MG: 15 TABLET, FILM COATED, EXTENDED RELEASE ORAL at 09:12

## 2022-06-10 RX ADMIN — LAMOTRIGINE 125 MG: 100 TABLET ORAL at 09:11

## 2022-06-10 RX ADMIN — PROCHLORPERAZINE EDISYLATE 10 MG: 5 INJECTION INTRAMUSCULAR; INTRAVENOUS at 20:21

## 2022-06-10 RX ADMIN — QUETIAPINE FUMARATE 25 MG: 25 TABLET ORAL at 20:22

## 2022-06-10 RX ADMIN — LEVETIRACETAM 1000 MG: 500 TABLET, FILM COATED ORAL at 09:11

## 2022-06-10 RX ADMIN — POLYETHYLENE GLYCOL 3350 17 G: 17 POWDER, FOR SOLUTION ORAL at 09:13

## 2022-06-10 RX ADMIN — OXYCODONE HYDROCHLORIDE AND ACETAMINOPHEN 1 TABLET: 5; 325 TABLET ORAL at 09:09

## 2022-06-10 RX ADMIN — MORPHINE SULFATE 30 MG: 15 TABLET, FILM COATED, EXTENDED RELEASE ORAL at 20:25

## 2022-06-10 RX ADMIN — HYDROCORTISONE 2.5%: 25 CREAM TOPICAL at 20:21

## 2022-06-10 RX ADMIN — LAMOTRIGINE 125 MG: 100 TABLET ORAL at 20:22

## 2022-06-10 RX ADMIN — PROCHLORPERAZINE EDISYLATE 10 MG: 5 INJECTION INTRAMUSCULAR; INTRAVENOUS at 12:53

## 2022-06-10 RX ADMIN — MORPHINE SULFATE 30 MG: 15 TABLET, FILM COATED, EXTENDED RELEASE ORAL at 09:10

## 2022-06-10 RX ADMIN — SODIUM CHLORIDE, PRESERVATIVE FREE 10 ML: 5 INJECTION INTRAVENOUS at 09:11

## 2022-06-10 RX ADMIN — PANTOPRAZOLE SODIUM 40 MG: 40 TABLET, DELAYED RELEASE ORAL at 10:14

## 2022-06-10 RX ADMIN — MORPHINE SULFATE 15 MG: 15 TABLET, FILM COATED, EXTENDED RELEASE ORAL at 20:21

## 2022-06-10 RX ADMIN — FUROSEMIDE 20 MG: 10 INJECTION, SOLUTION INTRAMUSCULAR; INTRAVENOUS at 10:14

## 2022-06-10 RX ADMIN — OXYCODONE HYDROCHLORIDE AND ACETAMINOPHEN 1 TABLET: 5; 325 TABLET ORAL at 04:55

## 2022-06-10 ASSESSMENT — PAIN DESCRIPTION - DESCRIPTORS
DESCRIPTORS: ACHING
DESCRIPTORS: ACHING

## 2022-06-10 ASSESSMENT — PAIN DESCRIPTION - LOCATION
LOCATION: ABDOMEN
LOCATION: ABDOMEN;BACK

## 2022-06-10 ASSESSMENT — PAIN SCALES - GENERAL
PAINLEVEL_OUTOF10: 7
PAINLEVEL_OUTOF10: 8
PAINLEVEL_OUTOF10: 6
PAINLEVEL_OUTOF10: 7

## 2022-06-10 ASSESSMENT — PAIN DESCRIPTION - ORIENTATION: ORIENTATION: MID

## 2022-06-10 ASSESSMENT — PAIN - FUNCTIONAL ASSESSMENT: PAIN_FUNCTIONAL_ASSESSMENT: PREVENTS OR INTERFERES SOME ACTIVE ACTIVITIES AND ADLS

## 2022-06-10 NOTE — PROGRESS NOTES
Occupational [id number] [email or web address]  Occupational Therapy Not Seen Note    DATE: 6/10/2022    NAME: Freda Ronquillo  MRN: [id number]   : 1965      Patient not seen this date for Occupational Therapy due to:    Patient Declined: Pt stated experiencing pain 8/10 in abdomen.  Just received pain meds and finished lunch, wanting to rest.    Next Scheduled Treatment:     Electronically signed by BAYRON Holland on 6/10/2022 at 2:05 PM

## 2022-06-10 NOTE — PROGRESS NOTES
[facility]  Internal Medicine Teaching Residency Program  Inpatient Daily Progress Note  ______________________________________________________________________________    Patient: Solis Plummer [facility]  YOB: 1965   SRZ:[id number]    Acct: [de-identified]     Room: [facility]  Admit date: 6/3/2022  Today's date: 06/10/22  Number of days in the hospital: 5    SUBJECTIVE   Admitting Diagnosis: Anemia  CC: . Chief Complaint   Patient presents with    Fatigue       Pt examined at bedside. Chart & results reviewed. No acute events overnight. Patient was nauseous yesterday. IV Compazine 10 mg added. Patient had no new complaints today. Patient is resting comfortably. Awaiting placement. Patient has been passing stools. Denied abdominal pain, back pain, chest pain, SOB, nausea, vomiting or dizziness. Left leg edema has improved after 2 doses of IV Lasix 20. Patient is afebrile and is hemodynamically stable with /61 and pulse 95. She is saturating 92% on room air. Labs reviewed. BMP is unremarkable. Hemoglobin 8.4-->8.2-->7.6 post units PRBC. STOOL OCCULT BLOOD POSITIVE on 6/6/2022. GI consulted, no interventions planned as patient has chronic sigmoid stricture not traversable by colonoscopy. Continued on stool softeners. Platelets 775-->341. Leukocytosis resolved. WBC trended down to 11.1 from 17.8. Blood culture negative so far. Awaiting respiratory and urine cultures. COVID-19 rapid negative. Heme-onc on board recommend transfusion to keep hemoglobin above 7.  slightly improved, no plans for chemotherapy while in house.     ROS:  Constitutional:  negative for chills, fevers, sweats  Respiratory:  negative for cough, dyspnea on exertion, hemoptysis, shortness of breath, wheezing  Cardiovascular:  negative for chest pain, chest pressure/discomfort, lower extremity edema, palpitations  Gastrointestinal:  Negative for abdominal pain. Negative for constipation, diarrhea, nausea, vomiting  Neurological:  negative for dizziness, headache  BRIEF HISTORY     The patient is a pleasant 59 y.o. female  with significant past medical history of stage IV ovarian cancer currently on chemotherapy presents with fatigue and generalized weakness. Patient also had constipation for over past 4 days since admission. She denied nausea, vomiting but did complain of mild abdominal pain in the periumbilical region of 6/48 intensity for a few months.     Patient states that she has missed her chemotherapy on 6/3/2022 due to generalized weakness. On arrival to the ED hemoglobin was checked and it was 6.3 and she received 1 unit PRBC. She was noted to be tachycardic at the time. CT abdomen pelvis showed colonic distention consistent with chronic ileus versus chronic obstruction. Patient was initially placed in observation unit for GI consult and now has been admitted to internal medicine service for further management.       GI suggested that the chronic sigmoid stricture is not traversable by colonoscopy and advised to continue on stool softeners with intermittent use of MiraLAX. GI signed off.     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     Echo from 2/22/2022 shows LVEF of 55%. On evaluation, patient denied headache, cough, chest pain, numbness, weakness, shortness of breath, abdominal pain or urinary symptoms. Patient  is afebrile and hemodynamically stable with /65, heart rate 107. She is saturating well on 2 L nasal cannula.     Initial hemoglobin was 6.3 after which she received 1 unit PRBC and it trended up to 8.1. Hemoglobin trended down to 7.3 and 6.8 later with another unit of PRBC transfusion. Hb now trended up to 8.2.   Stool occult positive from 2022. No intervention as per GI. OBJECTIVE     Vital Signs:  /61   Pulse 95   Temp 98.1 °F (36.7 °C) (Oral)   Resp 16   Wt 160 lb (72.6 kg)   SpO2 92%   BMI 26.63 kg/m²     Temp (24hrs), Av.2 °F (36.8 °C), Min:98 °F (36.7 °C), Max:98.4 °F (36.9 °C)    In: 500   Out: -     Physical Exam:  Constitutional: This is a well developed, well nourished, 25-29.9 - Overweight 59y.o. year old female who is alert, oriented, cooperative and in no apparent distress. Head:normocephalic and atraumatic. EENT:  PERRLA. No conjunctival injections. Septum was midline, mucosa was without erythema, exudates or cobblestoning. No thrush was noted. Neck: Supple without thyromegaly. No elevated JVP. Trachea was midline. Respiratory: Chest was symmetrical without dullness to percussion. Breath sounds bilaterally were clear to auscultation. There were no wheezes, rhonchi or rales. There is no intercostal retraction or use of accessory muscles. No egophony noted. Cardiovascular: Regular without murmur, clicks, gallops or rubs. Abdomen: Slightly rounded and soft without organomegaly. No rebound, rigidity or guarding was appreciated. Lymphatic: No lymphadenopathy. Musculoskeletal: Normal curvature of the spine. No gross muscle weakness. Extremities: Bilateral lower extremity pitting edema. Left lower extremity pitting edema has improved from 4+ to 3+. Right lower extremity 2+ pitting edema. No involuntary movements are noted. Skin:  Warm and dry. Good color, turgor and pigmentation. No lesions or scars.   No cyanosis or clubbing  Neurological/Psychiatric: The patient's general behavior, level of consciousness, thought content and emotional status is normal.        Medications:  Scheduled Medications:    polyethylene glycol  17 g Oral BID    morphine  30 mg Oral BID    morphine  15 mg Oral BID    apixaban  5 mg Oral BID    hydrocortisone   Rectal BID    lamoTRIgine  125 mg Oral BID    PELVIS W IV CONTRAST Additional Contrast? None    Result Date: 6/3/2022  Worsened diffuse colonic distension with stool and air-filled colon and wall thickening. Suggestion of a segment of narrowing of the rectosigmoid colon redemonstrated, similar or slightly improved from prior study. Chronic colonic obstruction/stricture of versus Lisha syndrome are considerations. Correlate with direct visualization if indicated. Unchanged masslike partially calcified lesion in the pelvis may be compatible with known history of malignancy or post treatment changes in this location. mild improved left inguinal lymphadenopathy, otherwise stable retroperitoneal lymphadenopathy with some partially calcified lymph nodes. Worsened subcutaneous soft tissue edema most significant at the left lower proximal thigh could be related to lymphedema. Stable osseous metastatic disease. XR CHEST PORTABLE    Result Date: 6/3/2022  Stable support line Mild prominence of interstitial markings suggests mild vascular congestion       ASSESSMENT & PLAN     ASSESSMENT / PLAN:     Principal Problem:    Anemia  Active Problems:    Iron deficiency    ACP (advance care planning)    Seizure ([address]    Fatigue    Chronic deep vein thrombosis (DVT) of femoral vein of left lower extremity (HCC)    Gynecologic cancer (HCC)  Resolved Problems:    Constipation    Hypokalemia      1. Severe anemia related to chemotherapy/myelosuppression at the time of anemia of chronic disease/cancer   - Fatigue and weakness secondary to anemia.  - Initial hemoglobin was 6.3 after which she received 1 unit PRBC on 6/3/2022 and it trended up to 8.1. Hemoglobin trended down to 7.3 and 6.8 on 6/5/2022 for which she received second unit of PRBC. Currently hemoglobin is stable at  8.4-->8.2-->7.6.  -  Stool occult blood positive on 6/6/2022. GI consulted and they said she is not a candidate for colonoscopy due to stricture. Her Hb is stable.  Patient to follow up with primary GI outpatient. - Monitor H and H.   - Transfuse if Hb < 7.     2. Iron deficiency   - Iron level 8 on 6/4/2022. Normal MCV (in contrast to low MCV) and elevated RDW. - Placed on VENOFER  mg every 24 hours for 2 doses.     3. Constipation   - Resolved. -  CT abdomen pelvis showed colonic distention consistent with chronic ileus versus chronic obstruction. - GI consulted who suggested that patient has a  chronic sigmoid stricture not traversable by colonoscopy and advised to continue on stool softeners with intermittent use of MiraLAX.     4. History of seizure disorder   - On KEPPRA 1 gm oral BID and LAMICTAL 125 mg oral BID.     5. Chronic DVT of left femoral vein  -Duplex venous ultrasound of left lower extremity from 5/13/2022 shows finding suggestive of thrombosis within the proximal to mid femoral vein. - On ELIQUIS 5 mgm oral BID. Given that she has risk factor for extension such as active cancer and proximal vein involvement, she needs to be on anticoagulation for 3 to 6 months.     6. Stage IV metastatic ovarian cancer   - Hem onc on board. Follow up recommendations. - slightly improved, no plans for chemotherapy while in-house per hemonc.  - On ELIQUIS for anticoagulation. 7.  Lower extremity pitting edema LLE> RLE  -Likely secondary to ovarian mass compressing vein/previous DVT of left femoral vein. -Received 2 doses of IV Lasix 20 mg with improvement in swelling.  -Advised patient to elevate legs, walk around and work with physical and Occupational Therapy. 8. Leukocytosis  -Resolved  - Likely secondary to Neupogen if she received it during chemotherapy. We will check with hematology. - Follow up pan cultures to rule out infection. Blood cultures show no growth so far. Palliative care consulted. Patient is full code. Currently patient is not ready to discuss.        DVT ppx:  On ELIQUIS 5 mg oral BID  GI ppx: Protonix 40 mg oral daily     PT/OT/SW: Consulted  Discharge Planning:  to assist with. Patient is accepted at [facility] and is awaiting precert. Ector Fitch MD  Internal Medicine Resident, PGY-1  [facility];  [place], [place]  6/10/2022, 8:24 AM

## 2022-06-10 NOTE — CARE COORDINATION
Transitional Planning:  Call to [facility] at [address]. Reached . Left message for call back.

## 2022-06-10 NOTE — PLAN OF CARE
Problem: Chronic Conditions and Co-morbidities  Goal: Patient's chronic conditions and co-morbidity symptoms are monitored and maintained or improved  6/10/2022 1758 by Toya Benitez RN  Outcome: Progressing  6/10/2022 0424 by Javon Fall RN  Outcome: Progressing

## 2022-06-10 NOTE — PROGRESS NOTES
Today's Date: 6/9/2022  Patient Name: Sima Leon  Date of admission: 6/3/2022  8:21 AM  Patient's age: 59 y.o., 1965  Admission Dx: Anemia [D64.9]  Anemia, unspecified type [D64.9]    Reason for Consult: management recommendations  Requesting Physician: No admitting provider for patient encounter. CHIEF COMPLAINT: Fatigue/patient on chemotherapy    History Obtained From:  Patient      Interval history:    Patient seen and examined  Labs and vitals reviewed  Patient resting comfortably  Denies new complaints or interval events  Awaiting placement  Patient is on Eliquis          HISTORY OF PRESENT ILLNESS:      The patient is a 59 y.o.  female who is admitted to the hospital for severe fatigue she was supposed to get her chemotherapy for stage IV ovarian cancer today and reported difficulty ambulating no fever or chills abdominal pain nausea vomiting she has constipation and she was admitted for observation, hemoglobin at 6.3 with a platelet at 085 and WBC 13.9    Oncology history as below       DIAGNOSIS:       Recurrent ovarian cancer. Original diagnosis 2005 with multiple recurrences. Diffuse metastasis. CURRENT THERAPY:         Doxil/ Avastin started 9/18/2020. Avastin was discontinued due to recent GI bleeding. Status post recent vascular embolization  S/p seizure disorder. Gemzar started 2/26/2021. Interrupted due to hospitalization and problem with compliance. Evidence of disease progression on CT scan 2/22/22  Add Carboplatin to Gemzar March 2022        Past Medical History:   has a past medical history of Anemia, Bleeding, Cervical cancer ([address] Depression, Diabetes mellitus ([address] GERD (gastroesophageal reflux disease), Hx of blood clots, Hypertension, Metastatic cancer ([address] Ovarian cancer ([address] Post chemo evaluation, and Splenic lesion. Past Surgical History:   has a past surgical history that includes Hysterectomy, total abdominal; Port Surgery;  Tonsillectomy; IR PORT PLACEMENT > 5 YEARS (2020); Anus surgery; Abscess Drainage (); colectomy (2013); IR EMBOLIZATION HEMORRHAGE (10/05/2020); and Cardiac catheterization. Medications:    Reviewed in Epic     Allergies:  Ceftriaxone    Social History:   reports that she has been smoking cigarettes. She has a 20.00 pack-year smoking history. She has never used smokeless tobacco. She reports previous alcohol use. She reports that she does not use drugs. Family History: family history includes Alcohol Abuse in her mother; Cirrhosis in her mother. REVIEW OF SYSTEMS:    Constitutional: No fever or chills. No night sweats,+weight loss ,+ fatigue  Eyes: No eye discharge, double vision, or eye pain   HEENT: negative for sore mouth, sore throat, hoarseness and voice change   Respiratory: negative for cough , sputum, dyspnea, wheezing, hemoptysis, chest pain   Cardiovascular: negative for chest pain, dyspnea, palpitations, orthopnea, PND   Gastrointestinal: negative for nausea, vomiting, diarrhea, constipation, abdominal pain, Dysphagia, hematemesis and hematochezia   Genitourinary: negative for frequency, dysuria, nocturia, urinary incontinence, and hematuria   Integument: negative for rash, skin lesions, bruises.    Hematologic/Lymphatic: negative for easy bruising, bleeding, lymphadenopathy, or petechiae   Endocrine: negative for heat or cold intolerance,weight changes, change in bowel habits and hair loss   Musculoskeletal: negative for myalgias, arthralgias, pain, joint swelling,and bone pain   Neurological: negative for headaches, dizziness, seizures, weakness, numbness    PHYSICAL EXAM:      /61   Pulse 95   Temp 98.1 °F (36.7 °C) (Oral)   Resp 16   Wt 160 lb (72.6 kg)   SpO2 92%   BMI 26.63 kg/m²    Temp (24hrs), Av.2 °F (36.8 °C), Min:98 °F (36.7 °C), Max:98.4 °F (36.9 °C)    General appearance -ill looking2  Mental status - alert and cooperative   Eyes - pupils equal and reactive, extraocular eye movements intact   Ears - bilateral TM's and external ear canals normal   Mouth - mucous membranes moist, pharynx normal without lesions   Neck - supple, no significant adenopathy   Lymphatics - no palpable lymphadenopathy, no hepatosplenomegaly   Chest - clear to auscultation, no wheezes, rales or rhonchi, symmetric air entry   Heart - normal rate, regular rhythm, normal S1, S2, no murmurs  Abdomen - soft, nontender, nondistended, no masses or organomegaly   Neurological - alert, oriented, normal speech, no focal findings or movement disorder noted   Musculoskeletal - no joint tenderness, deformity or swelling   Extremities - peripheral pulses normal, no pedal edema, no clubbing or cyanosis   Skin - normal coloration and turgor, no rashes, no suspicious skin lesions noted ,    DATA:    Labs:   CBC:   Recent Labs     06/08/22  0441 06/09/22  0555   WBC 13.9* 11.1   HGB 8.2* 8.4*   HCT 27.2* 28.8*   * 467*     BMP:   Recent Labs     06/08/22  0441 06/09/22  0555    137   K 3.6* 3.8   CO2 23 23   BUN 11 10   CREATININE 0.31* 0.31*   LABGLOM >60 >60   GLUCOSE 88 74     PT/INR: No results for input(s): PROTIME, INR in the last 72 hours. IMAGING DATA:  CT ABDOMEN PELVIS W IV CONTRAST Additional Contrast? None   Final Result   Worsened diffuse colonic distension with stool and air-filled colon and wall   thickening. Suggestion of a segment of narrowing of the rectosigmoid colon   redemonstrated, similar or slightly improved from prior study. Chronic   colonic obstruction/stricture of versus Lisha syndrome are considerations. Correlate with direct visualization if indicated. Unchanged masslike partially calcified lesion in the pelvis may be compatible   with known history of malignancy or post treatment changes in this location. mild improved left inguinal lymphadenopathy, otherwise stable retroperitoneal   lymphadenopathy with some partially calcified lymph nodes. Worsened subcutaneous soft tissue edema most significant at the left lower   proximal thigh could be related to lymphedema. Stable osseous metastatic disease. XR CHEST PORTABLE   Final Result   Stable support line      Mild prominence of interstitial markings suggests mild vascular congestion             Primary Problem  Anemia    Active Hospital Problems    Diagnosis Date Noted    Iron deficiency [E61.1]      Priority: Medium    Gynecologic cancer [facility]) [C57.9] 05/29/2021    Chronic deep vein thrombosis (DVT) of femoral vein of left lower extremity (HCC) [I82.512] 03/11/2021    Fatigue [R53.83]     Anemia [D64.9]     Seizure ([address] [R56.9] 10/21/2020    ACP (advance care planning) [Z71.89] 08/21/2020         IMPRESSION:   1. Severe anemia related to chemotherapy/myelosuppression at the time of anemia of chronic disease/cancer  2. Recurrent metastatic ovarian cancer  3. Severe weakness fatigue due to above  4. Thrombocytosis likely reactive  5. Leukocytosis  6. Constipation/concern about bowel obstruction with abnormal CT of the abdomen      RECOMMENDATIONS:  1. I reviewed the laboratory data, imaging studies, discussed the diagnosis and possible treatment  2. Continue Eliquis  3. Transfuse to keep hemoglobin above 7  4. Ultrasound from 5/2022 shows proximal femoral DVT  5. Denies any deonte bleeding  Monitor H&H okay to discharge from medical oncology standpoint    Discussed with patient and Nurse. Thank you for asking us to see this patient. Shira Romeo MD                 This note is created with the assistance of a speech recognition program.  While intending to generate a document that actually reflects the content of the visit, the document can still have some errors including those of syntax and sound a like substitutions which may escape proof reading. It such instances, actual meaning can be extrapolated by contextual diversion.

## 2022-06-10 NOTE — PLAN OF CARE
Problem: Chronic Conditions and Co-morbidities  Goal: Patient's chronic conditions and co-morbidity symptoms are monitored and maintained or improved  6/10/2022 0424 by Melba Hicks RN  Outcome: Progressing  6/9/2022 1610 by Jessica Brand RN  Outcome: Progressing  Flowsheets (Taken 6/9/2022 0800)  Care Plan - Patient's Chronic Conditions and Co-Morbidity Symptoms are Monitored and Maintained or Improved: Monitor and assess patient's chronic conditions and comorbid symptoms for stability, deterioration, or improvement     Problem: Pain  Goal: Verbalizes/displays adequate comfort level or baseline comfort level  6/10/2022 0424 by Melba Hicks RN  Outcome: Progressing  6/9/2022 1610 by Jessica Brand RN  Outcome: Progressing     Problem: Skin/Tissue Integrity  Goal: Absence of new skin breakdown  Description: 1. Monitor for areas of redness and/or skin breakdown  2. Assess vascular access sites hourly  3. Every 4-6 hours minimum:  Change oxygen saturation probe site  4. Every 4-6 hours:  If on nasal continuous positive airway pressure, respiratory therapy assess nares and determine need for appliance change or resting period.   6/10/2022 0424 by Melba Hicks RN  Outcome: Progressing  6/9/2022 1610 by Jessica Brand RN  Outcome: Progressing     Problem: Safety - Adult  Goal: Free from fall injury  6/10/2022 0424 by Melba Hicks RN  Outcome: Progressing  6/9/2022 1610 by Jessica Brand RN  Outcome: Progressing  Flowsheets (Taken 6/9/2022 0830)  Free From Fall Injury: Instruct family/caregiver on patient safety     Problem: ABCDS Injury Assessment  Goal: Absence of physical injury  6/10/2022 0424 by Melba Hicks RN  Outcome: Progressing  6/9/2022 1610 by Jessica Brand RN  Outcome: Progressing  Flowsheets (Taken 6/9/2022 0830)  Absence of Physical Injury: Implement safety measures based on patient assessment     Problem: Hematologic - Adult  Goal: Maintains hematologic stability  6/10/2022 0424 by Jean Blackburn RN  Outcome: Progressing  6/9/2022 1610 by Austen Oconnor RN  Outcome: Progressing  Flowsheets (Taken 6/9/2022 0800)  Maintains hematologic stability: Assess for signs and symptoms of bleeding or hemorrhage

## 2022-06-11 LAB
ABSOLUTE EOS #: 0.13 K/UL (ref 0–0.4)
ABSOLUTE IMMATURE GRANULOCYTE: 0 K/UL (ref 0–0.3)
ABSOLUTE LYMPH #: 0.4 K/UL (ref 1–4.8)
ABSOLUTE MONO #: 0.67 K/UL (ref 0.1–0.8)
ANION GAP SERPL CALCULATED.3IONS-SCNC: 9 MMOL/L (ref 9–17)
BASOPHILS # BLD: 1 % (ref 0–2)
BASOPHILS ABSOLUTE: 0.13 K/UL (ref 0–0.2)
BUN BLDV-MCNC: 9 MG/DL (ref 6–20)
CALCIUM SERPL-MCNC: 7.4 MG/DL (ref 8.6–10.4)
CHLORIDE BLD-SCNC: 105 MMOL/L (ref 98–107)
CO2: 24 MMOL/L (ref 20–31)
CREAT SERPL-MCNC: 0.35 MG/DL (ref 0.5–0.9)
EOSINOPHILS RELATIVE PERCENT: 1 % (ref 1–4)
GFR AFRICAN AMERICAN: >60 ML/MIN
GFR NON-AFRICAN AMERICAN: >60 ML/MIN
GFR SERPL CREATININE-BSD FRML MDRD: ABNORMAL ML/MIN/{1.73_M2}
GLUCOSE BLD-MCNC: 81 MG/DL (ref 70–99)
HCT VFR BLD CALC: 27.8 % (ref 36.3–47.1)
HEMOGLOBIN: 7.9 G/DL (ref 11.9–15.1)
IMMATURE GRANULOCYTES: 0 %
LYMPHOCYTES # BLD: 3 % (ref 24–44)
MCH RBC QN AUTO: 29.2 PG (ref 25.2–33.5)
MCHC RBC AUTO-ENTMCNC: 28.4 G/DL (ref 28.4–34.8)
MCV RBC AUTO: 102.6 FL (ref 82.6–102.9)
MONOCYTES # BLD: 5 % (ref 1–7)
MORPHOLOGY: ABNORMAL
MORPHOLOGY: ABNORMAL
NRBC AUTOMATED: 0 PER 100 WBC
PDW BLD-RTO: 21.8 % (ref 11.8–14.4)
PLATELET # BLD: 486 K/UL (ref 138–453)
PMV BLD AUTO: 9 FL (ref 8.1–13.5)
POTASSIUM SERPL-SCNC: 3.7 MMOL/L (ref 3.7–5.3)
RBC # BLD: 2.71 M/UL (ref 3.95–5.11)
SEG NEUTROPHILS: 90 % (ref 36–66)
SEGMENTED NEUTROPHILS ABSOLUTE COUNT: 11.97 K/UL (ref 1.8–7.7)
SODIUM BLD-SCNC: 138 MMOL/L (ref 135–144)
WBC # BLD: 13.3 K/UL (ref 3.5–11.3)

## 2022-06-11 PROCEDURE — 6360000002 HC RX W HCPCS: Performed by: STUDENT IN AN ORGANIZED HEALTH CARE EDUCATION/TRAINING PROGRAM

## 2022-06-11 PROCEDURE — 1200000000 HC SEMI PRIVATE

## 2022-06-11 PROCEDURE — 99232 SBSQ HOSP IP/OBS MODERATE 35: CPT | Performed by: INTERNAL MEDICINE

## 2022-06-11 PROCEDURE — 2580000003 HC RX 258: Performed by: STUDENT IN AN ORGANIZED HEALTH CARE EDUCATION/TRAINING PROGRAM

## 2022-06-11 PROCEDURE — 36415 COLL VENOUS BLD VENIPUNCTURE: CPT

## 2022-06-11 PROCEDURE — 80048 BASIC METABOLIC PNL TOTAL CA: CPT

## 2022-06-11 PROCEDURE — 85025 COMPLETE CBC W/AUTO DIFF WBC: CPT

## 2022-06-11 PROCEDURE — 6370000000 HC RX 637 (ALT 250 FOR IP)

## 2022-06-11 PROCEDURE — 6370000000 HC RX 637 (ALT 250 FOR IP): Performed by: STUDENT IN AN ORGANIZED HEALTH CARE EDUCATION/TRAINING PROGRAM

## 2022-06-11 PROCEDURE — 97110 THERAPEUTIC EXERCISES: CPT

## 2022-06-11 PROCEDURE — 6360000002 HC RX W HCPCS

## 2022-06-11 PROCEDURE — 97116 GAIT TRAINING THERAPY: CPT

## 2022-06-11 RX ORDER — DICYCLOMINE HYDROCHLORIDE 10 MG/ML
20 INJECTION INTRAMUSCULAR ONCE
Status: COMPLETED | OUTPATIENT
Start: 2022-06-11 | End: 2022-06-11

## 2022-06-11 RX ADMIN — MORPHINE SULFATE 15 MG: 15 TABLET, FILM COATED, EXTENDED RELEASE ORAL at 20:35

## 2022-06-11 RX ADMIN — PANTOPRAZOLE SODIUM 40 MG: 40 TABLET, DELAYED RELEASE ORAL at 08:53

## 2022-06-11 RX ADMIN — LEVETIRACETAM 1000 MG: 500 TABLET, FILM COATED ORAL at 08:53

## 2022-06-11 RX ADMIN — LAMOTRIGINE 125 MG: 100 TABLET ORAL at 20:27

## 2022-06-11 RX ADMIN — OXYCODONE HYDROCHLORIDE AND ACETAMINOPHEN 1 TABLET: 5; 325 TABLET ORAL at 08:52

## 2022-06-11 RX ADMIN — LORAZEPAM 1 MG: 1 TABLET ORAL at 20:35

## 2022-06-11 RX ADMIN — OXYCODONE HYDROCHLORIDE AND ACETAMINOPHEN 1 TABLET: 5; 325 TABLET ORAL at 21:50

## 2022-06-11 RX ADMIN — OXYCODONE HYDROCHLORIDE AND ACETAMINOPHEN 1 TABLET: 5; 325 TABLET ORAL at 03:35

## 2022-06-11 RX ADMIN — SODIUM CHLORIDE, PRESERVATIVE FREE 10 ML: 5 INJECTION INTRAVENOUS at 08:53

## 2022-06-11 RX ADMIN — MORPHINE SULFATE 30 MG: 15 TABLET, FILM COATED, EXTENDED RELEASE ORAL at 10:30

## 2022-06-11 RX ADMIN — DICYCLOMINE HYDROCHLORIDE 20 MG: 20 INJECTION, SOLUTION INTRAMUSCULAR at 12:28

## 2022-06-11 RX ADMIN — MORPHINE SULFATE 15 MG: 15 TABLET, FILM COATED, EXTENDED RELEASE ORAL at 10:30

## 2022-06-11 RX ADMIN — MORPHINE SULFATE 30 MG: 15 TABLET, FILM COATED, EXTENDED RELEASE ORAL at 20:35

## 2022-06-11 RX ADMIN — APIXABAN 5 MG: 5 TABLET, FILM COATED ORAL at 08:53

## 2022-06-11 RX ADMIN — LEVETIRACETAM 1000 MG: 500 TABLET, FILM COATED ORAL at 20:27

## 2022-06-11 RX ADMIN — APIXABAN 5 MG: 5 TABLET, FILM COATED ORAL at 20:27

## 2022-06-11 RX ADMIN — LORAZEPAM 1 MG: 1 TABLET ORAL at 13:37

## 2022-06-11 RX ADMIN — SERTRALINE 100 MG: 50 TABLET, FILM COATED ORAL at 08:53

## 2022-06-11 RX ADMIN — QUETIAPINE FUMARATE 25 MG: 25 TABLET ORAL at 20:27

## 2022-06-11 RX ADMIN — DICYCLOMINE HYDROCHLORIDE 20 MG: 20 INJECTION, SOLUTION INTRAMUSCULAR at 19:28

## 2022-06-11 RX ADMIN — OXYCODONE HYDROCHLORIDE AND ACETAMINOPHEN 1 TABLET: 5; 325 TABLET ORAL at 16:36

## 2022-06-11 RX ADMIN — SODIUM CHLORIDE, PRESERVATIVE FREE 10 ML: 5 INJECTION INTRAVENOUS at 20:27

## 2022-06-11 RX ADMIN — ONDANSETRON 4 MG: 2 INJECTION INTRAMUSCULAR; INTRAVENOUS at 10:30

## 2022-06-11 RX ADMIN — HYDROCORTISONE 2.5%: 25 CREAM TOPICAL at 20:27

## 2022-06-11 RX ADMIN — LAMOTRIGINE 125 MG: 100 TABLET ORAL at 08:53

## 2022-06-11 RX ADMIN — PROCHLORPERAZINE EDISYLATE 10 MG: 5 INJECTION INTRAMUSCULAR; INTRAVENOUS at 13:28

## 2022-06-11 ASSESSMENT — PAIN SCALES - GENERAL
PAINLEVEL_OUTOF10: 8
PAINLEVEL_OUTOF10: 7
PAINLEVEL_OUTOF10: 8

## 2022-06-11 ASSESSMENT — PAIN DESCRIPTION - LOCATION: LOCATION: ABDOMEN

## 2022-06-11 ASSESSMENT — PAIN DESCRIPTION - DESCRIPTORS: DESCRIPTORS: ACHING

## 2022-06-11 ASSESSMENT — PAIN - FUNCTIONAL ASSESSMENT: PAIN_FUNCTIONAL_ASSESSMENT: PREVENTS OR INTERFERES SOME ACTIVE ACTIVITIES AND ADLS

## 2022-06-11 ASSESSMENT — PAIN DESCRIPTION - ONSET: ONSET: ON-GOING

## 2022-06-11 ASSESSMENT — PAIN DESCRIPTION - ORIENTATION: ORIENTATION: MID

## 2022-06-11 ASSESSMENT — PAIN DESCRIPTION - FREQUENCY: FREQUENCY: CONTINUOUS

## 2022-06-11 ASSESSMENT — PAIN DESCRIPTION - PAIN TYPE: TYPE: CHRONIC PAIN

## 2022-06-11 NOTE — PLAN OF CARE
Problem: Chronic Conditions and Co-morbidities  Goal: Patient's chronic conditions and co-morbidity symptoms are monitored and maintained or improved  6/11/2022 0611 by Issa Astorga RN  Outcome: Progressing  6/10/2022 1758 by Manisha Moreno RN  Outcome: Progressing     Problem: Pain  Goal: Verbalizes/displays adequate comfort level or baseline comfort level  6/11/2022 0611 by Issa Astorga RN  Outcome: Progressing  6/10/2022 1758 by Manisha Moreno RN  Outcome: Progressing     Problem: Skin/Tissue Integrity  Goal: Absence of new skin breakdown  Description: 1. Monitor for areas of redness and/or skin breakdown  2. Assess vascular access sites hourly  3. Every 4-6 hours minimum:  Change oxygen saturation probe site  4. Every 4-6 hours:  If on nasal continuous positive airway pressure, respiratory therapy assess nares and determine need for appliance change or resting period.   6/11/2022 0611 by Issa Astorga RN  Outcome: Progressing  6/10/2022 1758 by Manisha Moreno RN  Outcome: Progressing     Problem: Safety - Adult  Goal: Free from fall injury  6/11/2022 0611 by Issa Astorga RN  Outcome: Progressing  6/10/2022 1758 by Manisha Moreno RN  Outcome: Progressing     Problem: ABCDS Injury Assessment  Goal: Absence of physical injury  6/11/2022 0611 by Issa Astorga RN  Outcome: Progressing  6/10/2022 1758 by Manisha Moreno RN  Outcome: Progressing     Problem: Hematologic - Adult  Goal: Maintains hematologic stability  6/11/2022 0611 by Issa Astorga RN  Outcome: Progressing  6/10/2022 1758 by Manisha Moreno RN  Outcome: Progressing

## 2022-06-11 NOTE — PLAN OF CARE
Problem: Chronic Conditions and Co-morbidities  Goal: Patient's chronic conditions and co-morbidity symptoms are monitored and maintained or improved  6/11/2022 1719 by Jessica Issa RN  Outcome: Progressing  6/11/2022 0611 by Fatmata Aguiar RN  Outcome: Progressing     Problem: Pain  Goal: Verbalizes/displays adequate comfort level or baseline comfort level  6/11/2022 1719 by Jessica Issa RN  Outcome: Progressing  6/11/2022 0611 by Fatmata Aguiar RN  Outcome: Progressing     Problem: Skin/Tissue Integrity  Goal: Absence of new skin breakdown  Description: 1. Monitor for areas of redness and/or skin breakdown  2. Assess vascular access sites hourly  3. Every 4-6 hours minimum:  Change oxygen saturation probe site  4. Every 4-6 hours:  If on nasal continuous positive airway pressure, respiratory therapy assess nares and determine need for appliance change or resting period.   6/11/2022 1719 by Jessica Issa RN  Outcome: Progressing  6/11/2022 0611 by Fatmata Aguiar RN  Outcome: Progressing     Problem: Safety - Adult  Goal: Free from fall injury  6/11/2022 1719 by Jessica Issa RN  Outcome: Progressing  6/11/2022 0611 by Fatmata Aguiar RN  Outcome: Progressing     Problem: ABCDS Injury Assessment  Goal: Absence of physical injury  6/11/2022 1719 by Jessica Issa RN  Outcome: Progressing  6/11/2022 0611 by Fatmata Aguiar RN  Outcome: Progressing     Problem: Hematologic - Adult  Goal: Maintains hematologic stability  6/11/2022 1719 by Jessica Issa RN  Outcome: Progressing  6/11/2022 0611 by Fatmata Aguiar RN  Outcome: Progressing

## 2022-06-11 NOTE — PROGRESS NOTES
Recommendations: Strengthening,ROM,Balance training,Functional mobility training,Transfer training,Endurance training,Stair training,Neuromuscular re-education,Home exercise program  Safety Devices  Type of Devices: Left in bed,Nurse notified,Patient at risk for falls,Call light within reach  Restraints  Restraints Initially in Place: No     Restrictions  Restrictions/Precautions  Restrictions/Precautions: Seizure,Fall Risk,Up as Tolerated,Bed Alarm  Required Braces or Orthoses?: No  Position Activity Restriction  Other position/activity restrictions: History of seizure     Subjective   General  Additional Pertinent Hx: The patient is a pleasant 59 y.o. female  with significant past medical history of stage IV ovarian cancer currently on chemotherapy presents with fatigue and generalized weakness  Response To Previous Treatment: Patient with no complaints from previous session. Family / Caregiver Present: No  Follows Commands: Within Functional Limits  Other (Comment): Pt awake and alert in agreement with PT intervention  Subjective  Subjective: RN and pt agreeable to PT ,  requires some encouragement for participation with good return. Cognition   Orientation  Overall Orientation Status: Within Functional Limits  Orientation Level: Oriented to place;Oriented to time;Oriented to person  Cognition  Overall Cognitive Status: Exceptions  Arousal/Alertness: Delayed responses to stimuli  Following Commands: Follows multistep commands with increased time  Memory: Appears intact  Safety Judgement: Decreased awareness of need for safety  Insights: Decreased awareness of deficits  Initiation: Requires cues for some  Sequencing: Requires cues for some  Cognition Comment: Pt able to make needs known, pleasent and cooperative. Objective      Bed mobility  Rolling to Right: Supervision  Sit to Supine: Contact guard assistance  Bed Mobility Comments: HOB elevated ~30 degrees. no use of rails.  significant weakness and fatigue anoted. Pt tolerated ~15 mins at EOB  Transfers  Sit to Stand: Minimal Assistance  Stand to sit: Minimal Assistance  Comment: Transfer with RW. Increase time to come to full standing . Ambulation  WB Status: FWB  Ambulation  Surface: level tile  Device: Rolling Walker  Assistance: Contact guard assistance  Quality of Gait: Unsteady , increase reliance on RW. Gait Deviations: Slow Cynthia;Staggers;Decreased step length;Decreased step height; Increased TAWANDA  Distance: 8ft x2  Comments: Pt limited by fatigue and decrease endurance. Balance  Posture: Good  Sitting - Static: Good  Sitting - Dynamic: Good  Standing - Static: Fair  Standing - Dynamic: Fair;-  Comments: Standing with RW. Exercise Treatment: Seated LE exercise program: Long Arc Quads, hip abduction/adduction, heel/toe raises, and marches. Reps:  x20      AM-PAC Score     AM-PAC Inpatient Mobility without Stair Climbing Raw Score : 15 (06/07/22 1447)  AM-PAC Inpatient without Stair Climbing T-Scale Score : 43.03 (06/07/22 1447)  Mobility Inpatient CMS 0-100% Score: 47.43 (06/07/22 1447)  Mobility Inpatient without Stair CMS G-Code Modifier : CK (06/07/22 1447)       Goals  Short Term Goals  Time Frame for Short term goals: 5 visits  Short term goal 1: Pt to ambualte 25 ft SBA with RW  Short term goal 2: Pt to complete functional transfers mod I with rolling walker  Long Term Goals  Time Frame for Long term goals : 14 visits  Long term goal 1: Pt to ambulate 100 ft with RW mod I  Long term goal 2: Pt to ascend /descend 5 steps mod I  Patient Goals   Patient goals : to walk and gain strength       Education  Patient Education  Education Given To: Patient  Education Provided: Role of Therapy;Plan of Care;Precautions;Transfer Training;Energy Conservation;Equipment; Fall Prevention Strategies  Education Provided Comments: Pt educated in use of rolling walker, safety with transfers and LE ROM/positioning  Education Method: Demonstration;Verbal  Barriers to Learning: None  Education Outcome: Verbalized understanding      Therapy Time   Individual Concurrent Group Co-treatment   Time In 1250         Time Out 1313         Minutes 23         Timed Code Treatment Minutes: [address], PTA

## 2022-06-11 NOTE — PROGRESS NOTES
[facility]  Internal Medicine Teaching Residency Program  Inpatient Daily Progress Note  ______________________________________________________________________________    Patient: Kel Palacios [facility]  YOB: 1965   BWT:[id number]    Acct: [de-identified]     Room: [facility]53-  Admit date: 6/3/2022  Today's date: 06/11/22  Number of days in the hospital: 6    SUBJECTIVE   Admitting Diagnosis: Anemia  CC: . Chief Complaint   Patient presents with    Fatigue       Pt examined at bedside. Chart & results reviewed. No acute events overnight. Patient remain hemodynamically stable and saturating well in room air. Labs reviewed. WBC elevated to 13, but patient has been afebrile, with sign of cellulitis, UTI, pneumonia. Blood culture and urine culture negative till date. Patient currently awaiting precert. Did have bowel movement this am.  Still has some swelling in lower extremities. Has received 20 mg lasix iv yesterday. ROS:  Constitutional:  negative for chills, fevers, sweats  Respiratory:  negative for cough, dyspnea on exertion, hemoptysis, shortness of breath, wheezing  Cardiovascular:  negative for chest pain, chest pressure/discomfort, lower extremity edema, palpitations  Gastrointestinal:  Negative for abdominal pain. Negative for constipation, diarrhea, nausea, vomiting  Neurological:  negative for dizziness, headache  BRIEF HISTORY     The patient is a pleasant 59 y.o. female  with significant past medical history of stage IV ovarian cancer currently on chemotherapy presents with fatigue and generalized weakness. Patient also had constipation for over past 4 days since admission. She denied nausea, vomiting but did complain of mild abdominal pain in the periumbilical region of 9/24 intensity for a few months.     Patient states that she has missed her chemotherapy on 6/3/2022 due to generalized weakness.   On arrival to the ED hemoglobin was checked and it was 6.3 and she received 1 unit PRBC. CT abdomen pelvis showed colonic distention consistent with chronic ileus versus chronic obstruction. Patient was initially placed in observation unit for GI consult and now has been admitted to internal medicine service for further management.       GI suggested that the chronic sigmoid stricture is not traversable by colonoscopy and advised to continue on stool softeners with intermittent use of MiraLAX. GI signed off.     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     Echo from 2022 shows LVEF of 55%. Initial hemoglobin was 6.3 after which she received 1 unit PRBC and it trended up to 8.1. Hemoglobin trended down to 7.3 and 6.8 later with another unit of PRBC transfusion. Hb now trended up to 8.2. Stool occult positive from 2022. No intervention as per GI. Patient currently awaiting placement. OBJECTIVE     Vital Signs:  /65   Pulse 96   Temp 98 °F (36.7 °C) (Oral)   Resp 15   Wt 160 lb (72.6 kg)   SpO2 90%   BMI 26.63 kg/m²     Temp (24hrs), Av.1 °F (36.7 °C), Min:98 °F (36.7 °C), Max:98.4 °F (36.9 °C)    In: -   Out: 1660 [Urine:1660]    Physical Exam:  Constitutional: This is a well developed, well nourished, 25-29.9 - Overweight 59y.o. year old female who is alert, oriented, cooperative and in no apparent distress. Head:normocephalic and atraumatic. EENT:  PERRLA. No conjunctival injections. Septum was midline, mucosa was without erythema, exudates or cobblestoning. No thrush was noted. Neck: Supple without thyromegaly. No elevated JVP. Trachea was midline. Respiratory: Chest was symmetrical without dullness to percussion. Breath sounds bilaterally were clear to auscultation. There were no wheezes, rhonchi or rales.  There is no intercostal retraction or use of accessory muscles. No egophony noted. Cardiovascular: Regular without murmur, clicks, gallops or rubs. Abdomen: Slightly rounded and soft without organomegaly. No rebound, rigidity or guarding was appreciated. Lymphatic: No lymphadenopathy. Musculoskeletal: Normal curvature of the spine. No gross muscle weakness. Extremities: Bilateral lower extremity pitting edema. Left lower extremity pitting edema has improved from 4+ to 3+. Right lower extremity 2+ pitting edema. No involuntary movements are noted. Skin:  Warm and dry. Good color, turgor and pigmentation. No lesions or scars.   No cyanosis or clubbing  Neurological/Psychiatric: The patient's general behavior, level of consciousness, thought content and emotional status is normal.        Medications:  Scheduled Medications:    polyethylene glycol  17 g Oral BID    morphine  30 mg Oral BID    morphine  15 mg Oral BID    apixaban  5 mg Oral BID    hydrocortisone   Rectal BID    lamoTRIgine  125 mg Oral BID    levETIRAcetam  1,000 mg Oral BID    pantoprazole  40 mg Oral Daily    QUEtiapine  25 mg Oral Nightly    sertraline  100 mg Oral Daily    sodium chloride flush  5-40 mL IntraVENous 2 times per day     Continuous Infusions:    sodium chloride      sodium chloride      sodium chloride Stopped (06/05/22 6681)     PRN Medicationsprochlorperazine, 10 mg, Q6H PRN  oxyCODONE-acetaminophen, 1 tablet, Q4H PRN  LORazepam, 1 mg, Q8H PRN  sodium chloride, , PRN  bisacodyl, 10 mg, Daily PRN  bisacodyl, 10 mg, TID PRN  sodium chloride, , PRN  benzonatate, 100 mg, TID PRN  melatonin, 5 mg, Nightly PRN  ondansetron, 4 mg, Q8H PRN  sodium chloride flush, 5-40 mL, PRN  sodium chloride, , PRN  acetaminophen, 650 mg, Q4H PRN  ondansetron, 4 mg, Q8H PRN   Or  ondansetron, 4 mg, Q6H PRN        Diagnostic Labs:  CBC:   Recent Labs     06/09/22  0555 06/10/22  0554 06/11/22  0649   WBC 11.1 11.0 13.3*   RBC 2.92* 2.67* 2.71*   HGB 8.4* 7.6* 7.9*   HCT 28.8* 26.3* 27.8*   MCV 98.6 98.5 102.6   RDW 21.8* 21.2* 21.8*   * 447 486*     BMP:   Recent Labs     06/09/22  0555 06/10/22  0554 06/11/22  0649    138 138   K 3.8 4.0 3.7    107 105   CO2 23 24 24   BUN 10 10 9   CREATININE 0.31* 0.33* 0.35*     BNP: No results for input(s): BNP in the last 72 hours. PT/INR: No results for input(s): PROTIME, INR in the last 72 hours. APTT: No results for input(s): APTT in the last 72 hours. CARDIAC ENZYMES: No results for input(s): CKMB, CKMBINDEX, TROPONINI in the last 72 hours. Invalid input(s): CKTOTAL;3  FASTING LIPID PANEL:  Lab Results   Component Value Date    CHOL 131 03/10/2021    HDL 33 (L) 03/10/2021    TRIG 147 03/10/2021     LIVER PROFILE:   No results for input(s): AST, ALT, ALB, BILIDIR, BILITOT, ALKPHOS in the last 72 hours. MICROBIOLOGY:   Lab Results   Component Value Date/Time    CULTURE NO GROWTH 3 DAYS 06/07/2022 04:43 PM    CULTURE NO GROWTH 3 DAYS 06/07/2022 04:43 PM       Imaging:    CT ABDOMEN PELVIS W IV CONTRAST Additional Contrast? None    Result Date: 6/3/2022  Worsened diffuse colonic distension with stool and air-filled colon and wall thickening. Suggestion of a segment of narrowing of the rectosigmoid colon redemonstrated, similar or slightly improved from prior study. Chronic colonic obstruction/stricture of versus [place] syndrome are considerations. Correlate with direct visualization if indicated. Unchanged masslike partially calcified lesion in the pelvis may be compatible with known history of malignancy or post treatment changes in this location. mild improved left inguinal lymphadenopathy, otherwise stable retroperitoneal lymphadenopathy with some partially calcified lymph nodes. Worsened subcutaneous soft tissue edema most significant at the left lower proximal thigh could be related to lymphedema. Stable osseous metastatic disease.      XR CHEST PORTABLE    Result Date: 6/3/2022  Stable support line Mild prominence of interstitial markings suggests mild vascular congestion       ASSESSMENT & PLAN     ASSESSMENT / PLAN:     Principal Problem:    Anemia  Active Problems:    Iron deficiency    ACP (advance care planning)    Seizure ([address]    Fatigue    Chronic deep vein thrombosis (DVT) of femoral vein of left lower extremity (HCC)    Gynecologic cancer (HCC)  Resolved Problems:    Constipation    Hypokalemia      1. Severe anemia related to chemotherapy/myelosuppression at the time of anemia of chronic disease/cancer   - Fatigue and weakness secondary to anemia.  - Initial hemoglobin was 6.3 after which she received 1 unit PRBC on 6/3/2022 and it trended up to 8.1. Hemoglobin trended down to 7.3 and 6.8 on 6/5/2022 for which she received second unit of PRBC. Currently hemoglobin is stable at  8.4-->8.2-->7.6>7.9  - Transfuse if Hb < 7.     2. Iron deficiency   - Iron level 8 on 6/4/2022. Normal MCV (in contrast to low MCV) and elevated RDW. - Placed on VENOFER  mg every 24 hours for 2 doses.     3. Constipation   - Resolved. -  CT abdomen pelvis showed colonic distention consistent with chronic ileus versus chronic obstruction. - GI consulted who suggested that patient has a  chronic sigmoid stricture not traversable by colonoscopy and advised to continue on stool softeners with intermittent use of MiraLAX.     4. History of seizure disorder   - On KEPPRA 1 gm oral BID and LAMICTAL 125 mg oral BID.     5. Chronic DVT of left femoral vein  -Duplex venous ultrasound of left lower extremity from 5/13/2022 shows finding suggestive of thrombosis within the proximal to mid femoral vein. - On ELIQUIS 5 mgm oral BID. Given that she has risk factor for extension such as active cancer and proximal vein involvement, she needs to be on anticoagulation for 3 to 6 months.     6. Stage IV metastatic ovarian cancer   - Hem onc on board. Follow up recommendations.   - slightly improved, no plans for chemotherapy while in-house per hemonc.  - On ELIQUIS for anticoagulation. 7.  Lower extremity pitting edema LLE> RLE  -Likely secondary to ovarian mass compressing vein/previous DVT of left femoral vein. -Received 2 doses of IV Lasix 20 mg with improvement in swelling.  -Advised patient to elevate legs, walk around and work with physical and Occupational Therapy. 8. Leukocytosis  - Likely secondary to Neupogen if she received it during chemotherapy. We will check with hematology. - Follow up pan cultures to rule out infection. Blood cultures show no growth so far. Palliative care consulted. Patient is full code. Currently patient is not ready to discuss.        DVT ppx: On ELIQUIS 5 mg oral BID  GI ppx: Protonix 40 mg oral daily     PT/OT/SW: Consulted  Discharge Planning:  to assist with. Patient is accepted at [facility] and is awaiting precert. Monica Weiss MD  Internal Medicine Resident, PGY-2  [facility];  [place], [place]  6/11/2022, 9:00 AM

## 2022-06-12 LAB
ABSOLUTE EOS #: 0.2 K/UL (ref 0–0.4)
ABSOLUTE IMMATURE GRANULOCYTE: 0 K/UL (ref 0–0.3)
ABSOLUTE LYMPH #: 1.39 K/UL (ref 1–4.8)
ABSOLUTE MONO #: 0.99 K/UL (ref 0.1–0.8)
ANION GAP SERPL CALCULATED.3IONS-SCNC: 10 MMOL/L (ref 9–17)
BASOPHILS # BLD: 0 % (ref 0–2)
BASOPHILS ABSOLUTE: 0 K/UL (ref 0–0.2)
BUN BLDV-MCNC: 7 MG/DL (ref 6–20)
CALCIUM SERPL-MCNC: 7.6 MG/DL (ref 8.6–10.4)
CHLORIDE BLD-SCNC: 104 MMOL/L (ref 98–107)
CO2: 23 MMOL/L (ref 20–31)
CREAT SERPL-MCNC: 0.36 MG/DL (ref 0.5–0.9)
CULTURE: NORMAL
CULTURE: NORMAL
EOSINOPHILS RELATIVE PERCENT: 2 % (ref 1–4)
GFR AFRICAN AMERICAN: >60 ML/MIN
GFR NON-AFRICAN AMERICAN: >60 ML/MIN
GFR SERPL CREATININE-BSD FRML MDRD: ABNORMAL ML/MIN/{1.73_M2}
GLUCOSE BLD-MCNC: 90 MG/DL (ref 70–99)
HCT VFR BLD CALC: 27.3 % (ref 36.3–47.1)
HEMOGLOBIN: 8.1 G/DL (ref 11.9–15.1)
IMMATURE GRANULOCYTES: 0 %
LYMPHOCYTES # BLD: 14 % (ref 24–44)
MCH RBC QN AUTO: 29.3 PG (ref 25.2–33.5)
MCHC RBC AUTO-ENTMCNC: 29.7 G/DL (ref 28.4–34.8)
MCV RBC AUTO: 98.9 FL (ref 82.6–102.9)
MONOCYTES # BLD: 10 % (ref 1–7)
MORPHOLOGY: ABNORMAL
MORPHOLOGY: ABNORMAL
NRBC AUTOMATED: 0 PER 100 WBC
PDW BLD-RTO: 21.6 % (ref 11.8–14.4)
PLATELET # BLD: 287 K/UL (ref 138–453)
PMV BLD AUTO: 9.9 FL (ref 8.1–13.5)
POTASSIUM SERPL-SCNC: 4 MMOL/L (ref 3.7–5.3)
RBC # BLD: 2.76 M/UL (ref 3.95–5.11)
REASON FOR REJECTION: NORMAL
SEG NEUTROPHILS: 74 % (ref 36–66)
SEGMENTED NEUTROPHILS ABSOLUTE COUNT: 7.32 K/UL (ref 1.8–7.7)
SODIUM BLD-SCNC: 137 MMOL/L (ref 135–144)
SPECIMEN DESCRIPTION: NORMAL
SPECIMEN DESCRIPTION: NORMAL
WBC # BLD: 9.9 K/UL (ref 3.5–11.3)
ZZ NTE CLEAN UP: ORDERED TEST: NORMAL
ZZ NTE WITH NAME CLEAN UP: SPECIMEN SOURCE: NORMAL

## 2022-06-12 PROCEDURE — 36415 COLL VENOUS BLD VENIPUNCTURE: CPT

## 2022-06-12 PROCEDURE — 6370000000 HC RX 637 (ALT 250 FOR IP)

## 2022-06-12 PROCEDURE — 2580000003 HC RX 258: Performed by: STUDENT IN AN ORGANIZED HEALTH CARE EDUCATION/TRAINING PROGRAM

## 2022-06-12 PROCEDURE — 99231 SBSQ HOSP IP/OBS SF/LOW 25: CPT | Performed by: INTERNAL MEDICINE

## 2022-06-12 PROCEDURE — 6370000000 HC RX 637 (ALT 250 FOR IP): Performed by: STUDENT IN AN ORGANIZED HEALTH CARE EDUCATION/TRAINING PROGRAM

## 2022-06-12 PROCEDURE — 80048 BASIC METABOLIC PNL TOTAL CA: CPT

## 2022-06-12 PROCEDURE — 85025 COMPLETE CBC W/AUTO DIFF WBC: CPT

## 2022-06-12 PROCEDURE — 6360000002 HC RX W HCPCS

## 2022-06-12 PROCEDURE — 1200000000 HC SEMI PRIVATE

## 2022-06-12 PROCEDURE — 97535 SELF CARE MNGMENT TRAINING: CPT

## 2022-06-12 RX ORDER — DICYCLOMINE HYDROCHLORIDE 10 MG/ML
20 INJECTION INTRAMUSCULAR
Status: COMPLETED | OUTPATIENT
Start: 2022-06-12 | End: 2022-06-12

## 2022-06-12 RX ORDER — DICYCLOMINE HYDROCHLORIDE 10 MG/ML
20 INJECTION INTRAMUSCULAR ONCE
Status: COMPLETED | OUTPATIENT
Start: 2022-06-12 | End: 2022-06-12

## 2022-06-12 RX ADMIN — OXYCODONE HYDROCHLORIDE AND ACETAMINOPHEN 1 TABLET: 5; 325 TABLET ORAL at 14:04

## 2022-06-12 RX ADMIN — APIXABAN 5 MG: 5 TABLET, FILM COATED ORAL at 08:49

## 2022-06-12 RX ADMIN — MORPHINE SULFATE 30 MG: 15 TABLET, FILM COATED, EXTENDED RELEASE ORAL at 21:11

## 2022-06-12 RX ADMIN — SODIUM CHLORIDE, PRESERVATIVE FREE 10 ML: 5 INJECTION INTRAVENOUS at 08:51

## 2022-06-12 RX ADMIN — SERTRALINE 100 MG: 50 TABLET, FILM COATED ORAL at 08:49

## 2022-06-12 RX ADMIN — DICYCLOMINE HYDROCHLORIDE 20 MG: 20 INJECTION, SOLUTION INTRAMUSCULAR at 22:54

## 2022-06-12 RX ADMIN — OXYCODONE HYDROCHLORIDE AND ACETAMINOPHEN 1 TABLET: 5; 325 TABLET ORAL at 22:21

## 2022-06-12 RX ADMIN — LEVETIRACETAM 1000 MG: 500 TABLET, FILM COATED ORAL at 08:49

## 2022-06-12 RX ADMIN — LORAZEPAM 1 MG: 1 TABLET ORAL at 21:11

## 2022-06-12 RX ADMIN — LORAZEPAM 1 MG: 1 TABLET ORAL at 08:49

## 2022-06-12 RX ADMIN — OXYCODONE HYDROCHLORIDE AND ACETAMINOPHEN 1 TABLET: 5; 325 TABLET ORAL at 04:29

## 2022-06-12 RX ADMIN — OXYCODONE HYDROCHLORIDE AND ACETAMINOPHEN 1 TABLET: 5; 325 TABLET ORAL at 18:33

## 2022-06-12 RX ADMIN — LAMOTRIGINE 125 MG: 100 TABLET ORAL at 08:49

## 2022-06-12 RX ADMIN — DICYCLOMINE HYDROCHLORIDE 20 MG: 20 INJECTION, SOLUTION INTRAMUSCULAR at 16:37

## 2022-06-12 RX ADMIN — LAMOTRIGINE 125 MG: 100 TABLET ORAL at 21:10

## 2022-06-12 RX ADMIN — MORPHINE SULFATE 15 MG: 15 TABLET, FILM COATED, EXTENDED RELEASE ORAL at 08:51

## 2022-06-12 RX ADMIN — SODIUM CHLORIDE, PRESERVATIVE FREE 10 ML: 5 INJECTION INTRAVENOUS at 21:11

## 2022-06-12 RX ADMIN — APIXABAN 5 MG: 5 TABLET, FILM COATED ORAL at 21:11

## 2022-06-12 RX ADMIN — OXYCODONE HYDROCHLORIDE AND ACETAMINOPHEN 1 TABLET: 5; 325 TABLET ORAL at 10:09

## 2022-06-12 RX ADMIN — PANTOPRAZOLE SODIUM 40 MG: 40 TABLET, DELAYED RELEASE ORAL at 08:49

## 2022-06-12 RX ADMIN — LEVETIRACETAM 1000 MG: 500 TABLET, FILM COATED ORAL at 21:10

## 2022-06-12 RX ADMIN — MORPHINE SULFATE 15 MG: 15 TABLET, FILM COATED, EXTENDED RELEASE ORAL at 21:11

## 2022-06-12 RX ADMIN — MORPHINE SULFATE 30 MG: 15 TABLET, FILM COATED, EXTENDED RELEASE ORAL at 08:49

## 2022-06-12 RX ADMIN — QUETIAPINE FUMARATE 25 MG: 25 TABLET ORAL at 21:11

## 2022-06-12 ASSESSMENT — PAIN SCALES - GENERAL
PAINLEVEL_OUTOF10: 8
PAINLEVEL_OUTOF10: 7
PAINLEVEL_OUTOF10: 7
PAINLEVEL_OUTOF10: 8
PAINLEVEL_OUTOF10: 6
PAINLEVEL_OUTOF10: 7
PAINLEVEL_OUTOF10: 7
PAINLEVEL_OUTOF10: 6

## 2022-06-12 NOTE — PROGRESS NOTES
[facility]  Internal Medicine Teaching Residency Program  Inpatient Daily Progress Note  ______________________________________________________________________________    Patient: Marcial Monson [facility]  YOB: 1965   RMY:[id number]    Acct: [de-identified]     Room: [facility]  Admit date: 6/3/2022  Today's date: 06/12/22  Number of days in the hospital: 7    SUBJECTIVE   Admitting Diagnosis: Anemia  CC: . Chief Complaint   Patient presents with    Fatigue       Pt examined at bedside. Chart & results reviewed. No acute events overnight. Does complain of right lower quadrant abdominal pain. Patient is afebrile and hemodynamically stable with /55 and Pulse 103  Labs reviewed, CBC- Hb 8.1 and platelets 229. WBC trended down to 9.9, patient is afebrile, with no signs of cellulitis, UTI, pneumonia. Blood culture and urine culture negative till date. Patient had 3 soft bowel movements yesterday. No diarrhea. Laxatives held. She still has lower extremity swelling. LLE>RLE. Received IV Lasix 20. Patient currently awaiting precert. ROS:  Constitutional:  negative for chills, fevers, sweats  Respiratory:  negative for cough, dyspnea on exertion, hemoptysis, shortness of breath, wheezing  Cardiovascular:  negative for chest pain, chest pressure/discomfort, lower extremity edema, palpitations  Gastrointestinal:  Positive for abdominal pain. Negative for constipation, diarrhea, nausea, vomiting  Neurological:  negative for dizziness, headache  BRIEF HISTORY     The patient is a pleasant 59 y.o. female  with significant past medical history of stage IV ovarian cancer currently on chemotherapy presents with fatigue and generalized weakness. Patient also had constipation for over past 4 days since admission.   She denied nausea, vomiting but did complain of mild abdominal pain in the periumbilical region of 0/47 intensity for a few months.     Patient states that she has missed her chemotherapy on 6/3/2022 due to generalized weakness. On arrival to the ED hemoglobin was checked and it was 6.3 and she received 1 unit PRBC. CT abdomen pelvis showed colonic distention consistent with chronic ileus versus chronic obstruction. Patient was initially placed in observation unit for GI consult and now has been admitted to internal medicine service for further management.       GI suggested that the chronic sigmoid stricture is not traversable by colonoscopy and advised to continue on stool softeners with intermittent use of MiraLAX. GI signed off.     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     Echo from 2022 shows LVEF of 55%. Initial hemoglobin was 6.3 after which she received 1 unit PRBC and it trended up to 8.1. Hemoglobin trended down to 7.3 and 6.8 later with another unit of PRBC transfusion. Hb now trended up to 8.2. Stool occult positive from 2022. No intervention as per GI. Patient currently awaiting placement. OBJECTIVE     Vital Signs:  BP (!) 100/55   Pulse (!) 103   Temp 98.5 °F (36.9 °C) (Oral)   Resp 18   Wt 160 lb (72.6 kg)   SpO2 95%   BMI 26.63 kg/m²     Temp (24hrs), Av.3 °F (36.8 °C), Min:98 °F (36.7 °C), Max:98.5 °F (36.9 °C)    In: -   Out: 900 [Urine:900]    Physical Exam:  Constitutional: This is a well developed, well nourished, 25-29.9 - Overweight 59y.o. year old female who is alert, oriented, cooperative and in no apparent distress. Head:normocephalic and atraumatic. EENT:  PERRLA. No conjunctival injections. Septum was midline, mucosa was without erythema, exudates or cobblestoning. No thrush was noted. Neck: Supple without thyromegaly. No elevated JVP. Trachea was midline.   Respiratory: Chest was symmetrical without dullness to percussion. Breath sounds bilaterally were clear to auscultation. There were no wheezes, rhonchi or rales. There is no intercostal retraction or use of accessory muscles. No egophony noted. Cardiovascular: Regular without murmur, clicks, gallops or rubs. Abdomen: Slightly rounded and soft without organomegaly. No rebound, rigidity or guarding was appreciated. Lymphatic: No lymphadenopathy. Musculoskeletal: Normal curvature of the spine. No gross muscle weakness. Extremities: Bilateral lower extremity pitting edema. Left lower extremity pitting edema has improved from 4+ to 3+. Right lower extremity 2+ pitting edema. No involuntary movements are noted. Skin:  Warm and dry. Good color, turgor and pigmentation. No lesions or scars.   No cyanosis or clubbing  Neurological/Psychiatric: The patient's general behavior, level of consciousness, thought content and emotional status is normal.        Medications:  Scheduled Medications:    [Held by provider] polyethylene glycol  17 g Oral BID    morphine  30 mg Oral BID    morphine  15 mg Oral BID    apixaban  5 mg Oral BID    hydrocortisone   Rectal BID    lamoTRIgine  125 mg Oral BID    levETIRAcetam  1,000 mg Oral BID    pantoprazole  40 mg Oral Daily    QUEtiapine  25 mg Oral Nightly    sertraline  100 mg Oral Daily    sodium chloride flush  5-40 mL IntraVENous 2 times per day     Continuous Infusions:    sodium chloride      sodium chloride      sodium chloride Stopped (06/05/22 3263)     PRN Medicationsprochlorperazine, 10 mg, Q6H PRN  oxyCODONE-acetaminophen, 1 tablet, Q4H PRN  LORazepam, 1 mg, Q8H PRN  sodium chloride, , PRN  [Held by provider] bisacodyl, 10 mg, Daily PRN  [Held by provider] bisacodyl, 10 mg, TID PRN  sodium chloride, , PRN  benzonatate, 100 mg, TID PRN  melatonin, 5 mg, Nightly PRN  ondansetron, 4 mg, Q8H PRN  sodium chloride flush, 5-40 mL, PRN  sodium chloride, , PRN  acetaminophen, 650 mg, Q4H PRN  ondansetron, 4 mg, Q8H PRN   Or  ondansetron, 4 mg, Q6H PRN        Diagnostic Labs:  CBC:   Recent Labs     06/10/22  0554 06/11/22  0649 06/12/22  0522   WBC 11.0 13.3* 9.9   RBC 2.67* 2.71* 2.76*   HGB 7.6* 7.9* 8.1*   HCT 26.3* 27.8* 27.3*   MCV 98.5 102.6 98.9   RDW 21.2* 21.8* 21.6*    486* 287     BMP:   Recent Labs     06/10/22  0554 06/11/22  0649    138   K 4.0 3.7    105   CO2 24 24   BUN 10 9   CREATININE 0.33* 0.35*     BNP: No results for input(s): BNP in the last 72 hours. PT/INR: No results for input(s): PROTIME, INR in the last 72 hours. APTT: No results for input(s): APTT in the last 72 hours. CARDIAC ENZYMES: No results for input(s): CKMB, CKMBINDEX, TROPONINI in the last 72 hours. Invalid input(s): CKTOTAL;3  FASTING LIPID PANEL:  Lab Results   Component Value Date    CHOL 131 03/10/2021    HDL 33 (L) 03/10/2021    TRIG 147 03/10/2021     LIVER PROFILE:   No results for input(s): AST, ALT, ALB, BILIDIR, BILITOT, ALKPHOS in the last 72 hours. MICROBIOLOGY:   Lab Results   Component Value Date/Time    CULTURE NO GROWTH 4 DAYS 06/07/2022 04:43 PM    CULTURE NO GROWTH 4 DAYS 06/07/2022 04:43 PM       Imaging:    CT ABDOMEN PELVIS W IV CONTRAST Additional Contrast? None    Result Date: 6/3/2022  Worsened diffuse colonic distension with stool and air-filled colon and wall thickening. Suggestion of a segment of narrowing of the rectosigmoid colon redemonstrated, similar or slightly improved from prior study. Chronic colonic obstruction/stricture of versus [place] syndrome are considerations. Correlate with direct visualization if indicated. Unchanged masslike partially calcified lesion in the pelvis may be compatible with known history of malignancy or post treatment changes in this location. mild improved left inguinal lymphadenopathy, otherwise stable retroperitoneal lymphadenopathy with some partially calcified lymph nodes.  Worsened subcutaneous soft tissue edema most significant at the left lower proximal thigh could be related to lymphedema. Stable osseous metastatic disease. XR CHEST PORTABLE    Result Date: 6/3/2022  Stable support line Mild prominence of interstitial markings suggests mild vascular congestion       ASSESSMENT & PLAN     ASSESSMENT / PLAN:     Principal Problem:    Anemia  Active Problems:    Iron deficiency    ACP (advance care planning)    Seizure ([address]    Fatigue    Chronic deep vein thrombosis (DVT) of femoral vein of left lower extremity (HCC)    Gynecologic cancer (HCC)  Resolved Problems:    Constipation    Hypokalemia      1. Severe anemia related to chemotherapy/myelosuppression at the time of anemia of chronic disease/cancer   - Fatigue and weakness secondary to anemia.  - Initial hemoglobin was 6.3 after which she received 1 unit PRBC on 6/3/2022 and it trended up to 8.1. Hemoglobin trended down to 7.3 and 6.8 on 6/5/2022 for which she received second unit of PRBC. Currently hemoglobin is stable at  8.4-->8.2-->7.6>7.9>8.1  - Transfuse if Hb < 7.     2. Iron deficiency   - Iron level 8 on 6/4/2022. Normal MCV (in contrast to low MCV) and elevated RDW. - Placed on VENOFER  mg every 24 hours for 2 doses.     3. Constipation   - Resolved. -  CT abdomen pelvis showed colonic distention consistent with chronic ileus versus chronic obstruction. - GI consulted who suggested that patient has a  chronic sigmoid stricture not traversable by colonoscopy and advised to continue on stool softeners with intermittent use of MiraLAX.     4. History of seizure disorder   - On KEPPRA 1 gm oral BID and LAMICTAL 125 mg oral BID.     5. Chronic DVT of left femoral vein  -Duplex venous ultrasound of left lower extremity from 5/13/2022 shows finding suggestive of thrombosis within the proximal to mid femoral vein. - On ELIQUIS 5 mgm oral BID.  Given that she has risk factor for extension such as active cancer and proximal vein involvement, she needs to be on anticoagulation for 3 to 6 months.     6. Stage IV metastatic ovarian cancer   - Hem onc on board. Follow up recommendations. - slightly improved, no plans for chemotherapy while in-house per hemonc.  - On ELIQUIS for anticoagulation. 7.  Lower extremity pitting edema LLE> RLE  -Likely secondary to ovarian mass compressing vein/previous DVT of left femoral vein. -Received 3 doses of IV Lasix 20 mg with improvement in swelling.  -Advised patient to elevate legs, walk around and work with physical and occupational therapy. 8. Leukocytosis  - Resolved. - Likely secondary to Neupogen if she received it during chemotherapy. We will check with hematology. - Follow up pan cultures to rule out infection. Blood cultures show no growth so far. Palliative care consulted. Patient is full code. Currently patient is not ready to discuss.        DVT ppx: On ELIQUIS 5 mg oral BID  GI ppx: Protonix 40 mg oral daily     PT/OT/SW: Consulted  Discharge Planning:  to assist with. Patient is accepted at [facility] and is awaiting precert. Susan Lambert MD  Internal Medicine Resident, PGY-1  [facility];  [place], [place]  6/12/2022, 7:11 AM

## 2022-06-12 NOTE — PLAN OF CARE
Problem: Chronic Conditions and Co-morbidities  Goal: Patient's chronic conditions and co-morbidity symptoms are monitored and maintained or improved  6/12/2022 0603 by Winter Lopez RN  Outcome: Progressing  6/11/2022 1719 by Josue Kumar RN  Outcome: Progressing     Problem: Pain  Goal: Verbalizes/displays adequate comfort level or baseline comfort level  6/12/2022 0603 by Winter Lopez RN  Outcome: Progressing  6/11/2022 1719 by Josue Kumar RN  Outcome: Progressing     Problem: Skin/Tissue Integrity  Goal: Absence of new skin breakdown  Description: 1. Monitor for areas of redness and/or skin breakdown  2. Assess vascular access sites hourly  3. Every 4-6 hours minimum:  Change oxygen saturation probe site  4. Every 4-6 hours:  If on nasal continuous positive airway pressure, respiratory therapy assess nares and determine need for appliance change or resting period.   6/12/2022 0603 by Winter Lopez RN  Outcome: Progressing  6/11/2022 1719 by Josue Kumar RN  Outcome: Progressing     Problem: Safety - Adult  Goal: Free from fall injury  6/12/2022 0603 by Winter Lopez RN  Outcome: Progressing  6/11/2022 1719 by Josue Kumar RN  Outcome: Progressing     Problem: ABCDS Injury Assessment  Goal: Absence of physical injury  6/12/2022 0603 by Winter Lopez RN  Outcome: Progressing  6/11/2022 1719 by Josue Kumar RN  Outcome: Progressing     Problem: Hematologic - Adult  Goal: Maintains hematologic stability  6/12/2022 0603 by Winter Lopez RN  Outcome: Progressing  6/11/2022 1719 by Josue Kumar RN  Outcome: Progressing

## 2022-06-12 NOTE — PROGRESS NOTES
Occupational Therapy  Facility/Department: [facility]  Occupational Therapy Daily Treatment Note    Name: Josi Clements  : 1965  MRN: [id number]  Date of Service: 2022    Discharge Recommendations:  Patient would benefit from continued therapy after discharge in order to increase pt strength, balance and independence          Assessment   Performance deficits / Impairments: Decreased functional mobility ; Decreased safe awareness;Decreased balance;Decreased ADL status; Decreased endurance;Decreased high-level IADLs;Decreased strength  Prognosis: Good  REQUIRES OT FOLLOW-UP: Yes  Activity Tolerance  Activity Tolerance: Patient limited by fatigue;Patient Tolerated treatment well        Plan   Plan  Times per Week: 3-4 x week     Restrictions  Restrictions/Precautions  Restrictions/Precautions: Seizure,Fall Risk,Up as Tolerated,Bed Alarm  Required Braces or Orthoses?: No  Position Activity Restriction  Other position/activity restrictions: History of seizure    Subjective   General  Chart Reviewed: Yes  Patient assessed for rehabilitation services?: Yes  Response to previous treatment: Patient with no complaints from previous session  Family / Caregiver Present: Yes (pt daughter arrived towards session)  General Comment  Comments: Pt and RN agreeable to therapy this day. Pt supine in bed at start of session and retired to supine in bed at session end with call light in reach and RN notified. Pt denied pain throughout session.         Objective      Safety Devices  Type of Devices: Gait belt;Left in bed;Call light within reach;Nurse notified  Restraints  Restraints Initially in Place: No  Balance  Sitting: Without support (SUP Pt tolerated approx 20 min static/dynamic sitting at EOB and shower)  Standing: With support (Pt tolerated approx 5 min CGA static/dynamic standing utilizing grab bars and RW)  Gait  Overall Level of Assistance: Minimum assistance (Min A EOB>bathroom without AD; CGA bathroom>EOB pt unsteady)  Toilet Transfers  Toilet - Technique: Ambulating  Equipment Used: Standard toilet  Toilet Transfer: Moderate assistance  Toilet Transfers Comments: with RW req increased assist d/t increased fatigue  Shower Transfers  Shower - Transfer From: [employer] - Transfer Type: To and From  Shower - Transfer To: Shower seat with back  Shower - Technique: Ambulating  Shower Transfers: Moderate assistance  Shower Transfers Comments: req assist for balance maintaince with min verbal cues on proper hand placement     ADL  UE Bathing: Stand by assistance;Setup  UE Bathing Skilled Clinical Factors: seated on shower chair  LE Bathing: Contact guard assistance;Setup  LE Bathing Skilled Clinical Factors: seated/standing at shower chair with grab bars  UE Dressing: Stand by assistance;Setup  UE Dressing Skilled Clinical Factors: To doff/don gown seated  LE Dressing: Moderate assistance;Setup;Verbal cueing; Increased time to complete  LE Dressing Skilled Clinical Factors: To doff/don socks seated utilizing figure 4 technique demo F hip flexion pt able to doff req assist to don sec to increased fatigue and weakness  Additional Comments: Throughout session pt limited per decreased strength, fatigue and decreased safety awareness. Pt req increased time throughout with min-mod verbal cues for safety. Bed mobility  Supine to Sit: Stand by assistance  Sit to Supine: Stand by assistance  Scooting: Stand by assistance  Bed Mobility Comments: HOB elevated, utilizing bed rails  Transfers  Sit to stand: Stand by assistance  Stand to sit: Stand by assistance  Transfer Comments: utilizing RW from EOB     Cognition  Overall Cognitive Status: Exceptions  Arousal/Alertness: Delayed responses to stimuli  Following Commands:  Follows multistep commands with repitition  Attention Span: Attends with cues to redirect  Safety Judgement: Decreased awareness of need for safety  Insights: Decreased awareness of

## 2022-06-13 VITALS
RESPIRATION RATE: 17 BRPM | OXYGEN SATURATION: 91 % | HEART RATE: 90 BPM | WEIGHT: 160 LBS | TEMPERATURE: 98.5 F | BODY MASS INDEX: 26.66 KG/M2 | HEIGHT: 65 IN | SYSTOLIC BLOOD PRESSURE: 117 MMHG | DIASTOLIC BLOOD PRESSURE: 65 MMHG

## 2022-06-13 LAB
ABSOLUTE EOS #: 0.38 K/UL (ref 0–0.44)
ABSOLUTE IMMATURE GRANULOCYTE: 0.1 K/UL (ref 0–0.3)
ABSOLUTE LYMPH #: 1.62 K/UL (ref 1.1–3.7)
ABSOLUTE MONO #: 1.24 K/UL (ref 0.1–1.2)
ANION GAP SERPL CALCULATED.3IONS-SCNC: 9 MMOL/L (ref 9–17)
BASOPHILS # BLD: 1 % (ref 0–2)
BASOPHILS ABSOLUTE: 0.1 K/UL (ref 0–0.2)
BUN BLDV-MCNC: 8 MG/DL (ref 6–20)
CALCIUM SERPL-MCNC: 7.7 MG/DL (ref 8.6–10.4)
CHLORIDE BLD-SCNC: 105 MMOL/L (ref 98–107)
CO2: 25 MMOL/L (ref 20–31)
CREAT SERPL-MCNC: 0.36 MG/DL (ref 0.5–0.9)
EOSINOPHILS RELATIVE PERCENT: 4 % (ref 1–4)
GFR AFRICAN AMERICAN: >60 ML/MIN
GFR NON-AFRICAN AMERICAN: >60 ML/MIN
GFR SERPL CREATININE-BSD FRML MDRD: ABNORMAL ML/MIN/{1.73_M2}
GLUCOSE BLD-MCNC: 81 MG/DL (ref 70–99)
HCT VFR BLD CALC: 26.3 % (ref 36.3–47.1)
HEMOGLOBIN: 7.8 G/DL (ref 11.9–15.1)
IMMATURE GRANULOCYTES: 1 %
LYMPHOCYTES # BLD: 17 % (ref 24–43)
MCH RBC QN AUTO: 29.2 PG (ref 25.2–33.5)
MCHC RBC AUTO-ENTMCNC: 29.7 G/DL (ref 28.4–34.8)
MCV RBC AUTO: 98.5 FL (ref 82.6–102.9)
MONOCYTES # BLD: 13 % (ref 3–12)
MORPHOLOGY: ABNORMAL
MORPHOLOGY: ABNORMAL
NRBC AUTOMATED: 0 PER 100 WBC
PDW BLD-RTO: 21.2 % (ref 11.8–14.4)
PLATELET # BLD: 482 K/UL (ref 138–453)
PMV BLD AUTO: 8.8 FL (ref 8.1–13.5)
POTASSIUM SERPL-SCNC: 3.6 MMOL/L (ref 3.7–5.3)
RBC # BLD: 2.67 M/UL (ref 3.95–5.11)
SEG NEUTROPHILS: 64 % (ref 36–65)
SEGMENTED NEUTROPHILS ABSOLUTE COUNT: 6.06 K/UL (ref 1.5–8.1)
SODIUM BLD-SCNC: 139 MMOL/L (ref 135–144)
WBC # BLD: 9.5 K/UL (ref 3.5–11.3)

## 2022-06-13 PROCEDURE — 85025 COMPLETE CBC W/AUTO DIFF WBC: CPT

## 2022-06-13 PROCEDURE — 97530 THERAPEUTIC ACTIVITIES: CPT

## 2022-06-13 PROCEDURE — 80048 BASIC METABOLIC PNL TOTAL CA: CPT

## 2022-06-13 PROCEDURE — 6360000002 HC RX W HCPCS: Performed by: CLINICAL NURSE SPECIALIST

## 2022-06-13 PROCEDURE — 6360000002 HC RX W HCPCS

## 2022-06-13 PROCEDURE — 6370000000 HC RX 637 (ALT 250 FOR IP): Performed by: STUDENT IN AN ORGANIZED HEALTH CARE EDUCATION/TRAINING PROGRAM

## 2022-06-13 PROCEDURE — 97110 THERAPEUTIC EXERCISES: CPT

## 2022-06-13 PROCEDURE — 99232 SBSQ HOSP IP/OBS MODERATE 35: CPT | Performed by: INTERNAL MEDICINE

## 2022-06-13 PROCEDURE — 36415 COLL VENOUS BLD VENIPUNCTURE: CPT

## 2022-06-13 PROCEDURE — 6370000000 HC RX 637 (ALT 250 FOR IP)

## 2022-06-13 PROCEDURE — 2580000003 HC RX 258: Performed by: STUDENT IN AN ORGANIZED HEALTH CARE EDUCATION/TRAINING PROGRAM

## 2022-06-13 RX ORDER — DICYCLOMINE HYDROCHLORIDE 10 MG/ML
20 INJECTION INTRAMUSCULAR ONCE
Status: COMPLETED | OUTPATIENT
Start: 2022-06-13 | End: 2022-06-13

## 2022-06-13 RX ORDER — POTASSIUM CHLORIDE 7.45 MG/ML
40 INJECTION INTRAVENOUS ONCE
Status: DISCONTINUED | OUTPATIENT
Start: 2022-06-13 | End: 2022-06-13

## 2022-06-13 RX ORDER — HEPARIN SODIUM (PORCINE) LOCK FLUSH IV SOLN 100 UNIT/ML 100 UNIT/ML
500 SOLUTION INTRAVENOUS PRN
Status: DISCONTINUED | OUTPATIENT
Start: 2022-06-13 | End: 2022-06-13 | Stop reason: HOSPADM

## 2022-06-13 RX ORDER — MORPHINE SULFATE 15 MG/1
15 TABLET, FILM COATED, EXTENDED RELEASE ORAL 2 TIMES DAILY
Qty: 60 TABLET | Refills: 0 | Status: SHIPPED | OUTPATIENT
Start: 2022-06-13 | End: 2022-06-13 | Stop reason: SDUPTHER

## 2022-06-13 RX ORDER — HEPARIN SODIUM (PORCINE) LOCK FLUSH IV SOLN 100 UNIT/ML 100 UNIT/ML
100 SOLUTION INTRAVENOUS PRN
Status: DISCONTINUED | OUTPATIENT
Start: 2022-06-13 | End: 2022-06-13 | Stop reason: HOSPADM

## 2022-06-13 RX ORDER — MORPHINE SULFATE 15 MG/1
15 TABLET, FILM COATED, EXTENDED RELEASE ORAL 2 TIMES DAILY
Qty: 60 TABLET | Refills: 0 | Status: SHIPPED | OUTPATIENT
Start: 2022-06-13 | End: 2022-07-13

## 2022-06-13 RX ADMIN — MORPHINE SULFATE 15 MG: 15 TABLET, FILM COATED, EXTENDED RELEASE ORAL at 20:54

## 2022-06-13 RX ADMIN — LEVETIRACETAM 1000 MG: 500 TABLET, FILM COATED ORAL at 20:52

## 2022-06-13 RX ADMIN — MORPHINE SULFATE 15 MG: 15 TABLET, FILM COATED, EXTENDED RELEASE ORAL at 09:45

## 2022-06-13 RX ADMIN — LORAZEPAM 1 MG: 1 TABLET ORAL at 15:18

## 2022-06-13 RX ADMIN — MORPHINE SULFATE 30 MG: 15 TABLET, FILM COATED, EXTENDED RELEASE ORAL at 09:43

## 2022-06-13 RX ADMIN — MORPHINE SULFATE 30 MG: 15 TABLET, FILM COATED, EXTENDED RELEASE ORAL at 20:53

## 2022-06-13 RX ADMIN — PANTOPRAZOLE SODIUM 40 MG: 40 TABLET, DELAYED RELEASE ORAL at 09:44

## 2022-06-13 RX ADMIN — APIXABAN 5 MG: 5 TABLET, FILM COATED ORAL at 09:44

## 2022-06-13 RX ADMIN — QUETIAPINE FUMARATE 25 MG: 25 TABLET ORAL at 20:53

## 2022-06-13 RX ADMIN — LAMOTRIGINE 125 MG: 100 TABLET ORAL at 20:52

## 2022-06-13 RX ADMIN — LEVETIRACETAM 1000 MG: 500 TABLET, FILM COATED ORAL at 09:44

## 2022-06-13 RX ADMIN — OXYCODONE HYDROCHLORIDE AND ACETAMINOPHEN 1 TABLET: 5; 325 TABLET ORAL at 07:57

## 2022-06-13 RX ADMIN — LAMOTRIGINE 125 MG: 100 TABLET ORAL at 09:44

## 2022-06-13 RX ADMIN — APIXABAN 5 MG: 5 TABLET, FILM COATED ORAL at 20:53

## 2022-06-13 RX ADMIN — SODIUM CHLORIDE, PRESERVATIVE FREE 10 ML: 5 INJECTION INTRAVENOUS at 09:45

## 2022-06-13 RX ADMIN — Medication 500 UNITS: at 20:44

## 2022-06-13 RX ADMIN — OXYCODONE HYDROCHLORIDE AND ACETAMINOPHEN 1 TABLET: 5; 325 TABLET ORAL at 11:58

## 2022-06-13 RX ADMIN — DICYCLOMINE HYDROCHLORIDE 20 MG: 20 INJECTION, SOLUTION INTRAMUSCULAR at 11:58

## 2022-06-13 RX ADMIN — SERTRALINE 100 MG: 50 TABLET, FILM COATED ORAL at 09:44

## 2022-06-13 RX ADMIN — OXYCODONE HYDROCHLORIDE AND ACETAMINOPHEN 1 TABLET: 5; 325 TABLET ORAL at 17:29

## 2022-06-13 RX ADMIN — OXYCODONE HYDROCHLORIDE AND ACETAMINOPHEN 1 TABLET: 5; 325 TABLET ORAL at 03:08

## 2022-06-13 RX ADMIN — POTASSIUM BICARBONATE 40 MEQ: 782 TABLET, EFFERVESCENT ORAL at 09:43

## 2022-06-13 ASSESSMENT — PAIN DESCRIPTION - LOCATION
LOCATION: ABDOMEN

## 2022-06-13 ASSESSMENT — PAIN DESCRIPTION - ORIENTATION
ORIENTATION: RIGHT;LEFT
ORIENTATION: RIGHT;LEFT;MID
ORIENTATION: LOWER;MID
ORIENTATION: RIGHT;LEFT

## 2022-06-13 ASSESSMENT — PAIN SCALES - GENERAL
PAINLEVEL_OUTOF10: 7
PAINLEVEL_OUTOF10: 9
PAINLEVEL_OUTOF10: 7

## 2022-06-13 ASSESSMENT — PAIN SCALES - WONG BAKER: WONGBAKER_NUMERICALRESPONSE: 0

## 2022-06-13 ASSESSMENT — PAIN DESCRIPTION - FREQUENCY: FREQUENCY: CONTINUOUS

## 2022-06-13 ASSESSMENT — PAIN DESCRIPTION - ONSET: ONSET: PROGRESSIVE

## 2022-06-13 ASSESSMENT — PAIN DESCRIPTION - PAIN TYPE: TYPE: CHRONIC PAIN

## 2022-06-13 ASSESSMENT — PAIN DESCRIPTION - DESCRIPTORS
DESCRIPTORS: CRAMPING
DESCRIPTORS: ACHING

## 2022-06-13 NOTE — PROGRESS NOTES
[facility]  Internal Medicine Teaching Residency Program  Inpatient Daily Progress Note  ______________________________________________________________________________    Patient: Thea Beauchamp [facility]  YOB: 1965   MQX:[id number]    Acct: [de-identified]     Room: [address] [place] [address]  Admit date: 6/3/2022  Today's date: 06/13/22  Number of days in the hospital: 8    SUBJECTIVE   Admitting Diagnosis: Anemia  CC: . Chief Complaint   Patient presents with    Fatigue       Pt examined at bedside. Chart & results reviewed. Overnight patient had a fall while trying to get back into bed after using commode. She was checked for any visual injuries and found none. She did not hit her head and has no injuries or pain at that time. Patient is afebrile, hemodynamically stable with /58 and pulse 94. She is saturating well on room air. Labs reviewed, BMP shows hypokalemia with potassium 3.6-replaced. CBC shows Hb 7.6-->7.9-->8.1-->7.8. Leukocytosis trending down WBC 13.3-9.5.,  Patient is afebrile, no signs of cellulitis, UTI, pneumonia. Blood cultures negative. Urine and respiratory culture - no result. Patient states she does have lower abdominal pain 7 x 10 in intensity. She did have loose stools of 1 episode yesterday. Laxatives held. Still has lower extremity swelling. LLE> RLE. Received 3 doses of IV Lasix 20. Patient is currently awaiting pre-CERT.    ROS:  Constitutional:  negative for chills, fevers, sweats  Respiratory:  negative for cough, dyspnea on exertion, hemoptysis, shortness of breath, wheezing  Cardiovascular:  negative for chest pain, chest pressure/discomfort, lower extremity edema, palpitations  Gastrointestinal:  Positive for abdominal pain.  Negative for constipation, diarrhea, nausea, vomiting  Neurological:  negative for dizziness, headache  BRIEF HISTORY     The patient is a pleasant 59 y.o. female  with significant past medical history of stage IV ovarian cancer currently on chemotherapy presents with fatigue and generalized weakness. Patient also had constipation for over past 4 days since admission. She denied nausea, vomiting but did complain of mild abdominal pain in the periumbilical region of  intensity for a few months.     Patient states that she has missed her chemotherapy on 6/3/2022 due to generalized weakness. On arrival to the ED hemoglobin was checked and it was 6 0.6-replace 3 and she received 1 unit PRBC. CT abdomen pelvis showed colonic distention consistent with chronic ileus versus chronic obstruction. Patient was initially placed in observation unit for GI consult and now has been admitted to internal medicine service for further management.       GI suggested that the chronic sigmoid stricture is not traversable by colonoscopy and advised to continue on stool softeners with intermittent use of MiraLAX. GI signed off.     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     Echo from 2022 shows LVEF of 55%. Initial hemoglobin was 6.3 after which she received 1 unit PRBC and it trended up to 8.1. Hemoglobin trended down to 7.3 and 6.8 later with another unit of PRBC transfusion. Hb now trended up to 8.2. Stool occult positive from 2022. No intervention as per GI. Hb stable. Patient currently awaiting placement. OBJECTIVE     Vital Signs:  BP (!) 104/58   Pulse 94   Temp 98.1 °F (36.7 °C) (Oral)   Resp 16   Wt 160 lb (72.6 kg)   SpO2 91%   BMI 26.63 kg/m²     Temp (24hrs), Av.2 °F (36.8 °C), Min:98.1 °F (36.7 °C), Max:98.3 °F (36.8 °C)    No intake/output data recorded.     Physical Exam:  Constitutional: This is a well developed, well nourished, 25-29.9 - Overweight 59y.o. year old female who is alert, oriented, cooperative and in no apparent distress. Head:normocephalic and atraumatic. EENT:  PERRLA. No conjunctival injections. Septum was midline, mucosa was without erythema, exudates or cobblestoning. No thrush was noted. Neck: Supple without thyromegaly. No elevated JVP. Trachea was midline. Respiratory: Chest was symmetrical without dullness to percussion. Breath sounds bilaterally were clear to auscultation. There were no wheezes, rhonchi or rales. There is no intercostal retraction or use of accessory muscles. No egophony noted. Cardiovascular: Regular without murmur, clicks, gallops or rubs. Abdomen: Slightly rounded and soft without organomegaly. No rebound, rigidity or guarding was appreciated. No tenderness present. Lymphatic: No lymphadenopathy. Musculoskeletal: Normal curvature of the spine. No gross muscle weakness. Extremities: Bilateral lower extremity pitting edema. Left lower extremity pitting edema has improved from 4+ to 3+. Right lower extremity 2+ pitting edema. No involuntary movements are noted. Skin:  Warm and dry. Good color, turgor and pigmentation. No lesions or scars.   No cyanosis or clubbing  Neurological/Psychiatric: The patient's general behavior, level of consciousness, thought content and emotional status is normal.        Medications:  Scheduled Medications:    [Held by provider] polyethylene glycol  17 g Oral BID    morphine  30 mg Oral BID    morphine  15 mg Oral BID    apixaban  5 mg Oral BID    hydrocortisone   Rectal BID    lamoTRIgine  125 mg Oral BID    levETIRAcetam  1,000 mg Oral BID    pantoprazole  40 mg Oral Daily    QUEtiapine  25 mg Oral Nightly    sertraline  100 mg Oral Daily    sodium chloride flush  5-40 mL IntraVENous 2 times per day     Continuous Infusions:    sodium chloride      sodium chloride      sodium chloride Stopped (06/05/22 2478)     PRN Medicationsprochlorperazine, 10 mg, Q6H PRN  oxyCODONE-acetaminophen, 1 tablet, Q4H PRN  LORazepam, 1 mg, Q8H PRN  sodium chloride, , PRN  [Held by provider] bisacodyl, 10 mg, Daily PRN  [Held by provider] bisacodyl, 10 mg, TID PRN  sodium chloride, , PRN  benzonatate, 100 mg, TID PRN  melatonin, 5 mg, Nightly PRN  ondansetron, 4 mg, Q8H PRN  sodium chloride flush, 5-40 mL, PRN  sodium chloride, , PRN  acetaminophen, 650 mg, Q4H PRN  ondansetron, 4 mg, Q8H PRN   Or  ondansetron, 4 mg, Q6H PRN        Diagnostic Labs:  CBC:   Recent Labs     06/11/22  0649 06/12/22  0522 06/13/22  0624   WBC 13.3* 9.9 9.5   RBC 2.71* 2.76* 2.67*   HGB 7.9* 8.1* 7.8*   HCT 27.8* 27.3* 26.3*   .6 98.9 98.5   RDW 21.8* 21.6* 21.2*   * 287 482*     BMP:   Recent Labs     06/11/22  0649 06/12/22  0707 06/13/22  0624    137 139   K 3.7 4.0 3.6*    104 105   CO2 24 23 25   BUN 9 7 8   CREATININE 0.35* 0.36* 0.36*     BNP: No results for input(s): BNP in the last 72 hours. PT/INR: No results for input(s): PROTIME, INR in the last 72 hours. APTT: No results for input(s): APTT in the last 72 hours. CARDIAC ENZYMES: No results for input(s): CKMB, CKMBINDEX, TROPONINI in the last 72 hours. Invalid input(s): CKTOTAL;3  FASTING LIPID PANEL:  Lab Results   Component Value Date    CHOL 131 03/10/2021    HDL 33 (L) 03/10/2021    TRIG 147 03/10/2021     LIVER PROFILE:   No results for input(s): AST, ALT, ALB, BILIDIR, BILITOT, ALKPHOS in the last 72 hours. MICROBIOLOGY:   Lab Results   Component Value Date/Time    CULTURE NO GROWTH 5 DAYS 06/07/2022 04:43 PM    CULTURE NO GROWTH 5 DAYS 06/07/2022 04:43 PM       Imaging:    CT ABDOMEN PELVIS W IV CONTRAST Additional Contrast? None    Result Date: 6/3/2022  Worsened diffuse colonic distension with stool and air-filled colon and wall thickening. Suggestion of a segment of narrowing of the rectosigmoid colon redemonstrated, similar or slightly improved from prior study.   Chronic colonic obstruction/stricture of versus Lisha syndrome are considerations. Correlate with direct visualization if indicated. Unchanged masslike partially calcified lesion in the pelvis may be compatible with known history of malignancy or post treatment changes in this location. mild improved left inguinal lymphadenopathy, otherwise stable retroperitoneal lymphadenopathy with some partially calcified lymph nodes. Worsened subcutaneous soft tissue edema most significant at the left lower proximal thigh could be related to lymphedema. Stable osseous metastatic disease. XR CHEST PORTABLE    Result Date: 6/3/2022  Stable support line Mild prominence of interstitial markings suggests mild vascular congestion       ASSESSMENT & PLAN     ASSESSMENT / PLAN:     Principal Problem:    Anemia  Active Problems:    Iron deficiency    ACP (advance care planning)    Seizure ([address]    Fatigue    Chronic deep vein thrombosis (DVT) of femoral vein of left lower extremity (HCC)    Gynecologic cancer (HCC)  Resolved Problems:    Constipation    Hypokalemia      1. Severe anemia related to chemotherapy/myelosuppression at the time of anemia of chronic disease/cancer   - Fatigue and weakness secondary to anemia.  - Initial hemoglobin was 6.3 after which she received 1 unit PRBC on 6/3/2022 and it trended up to 8.1. Hemoglobin trended down to 7.3 and 6.8 on 6/5/2022 for which she received second unit of PRBC. Currently hemoglobin is stable at  8.4-->8.2-->7.6>7.9>8.1>7.8  - Transfuse if Hb < 7.     2. Iron deficiency   - Iron level 8 on 6/4/2022. Normal MCV (in contrast to low MCV) and elevated RDW. - Received VENOFER  mg every 24 hours for 4 doses.     3. Constipation   - Resolved. -  CT abdomen pelvis on 6/3/2022 showed colonic distention consistent with chronic ileus versus chronic obstruction.    - GI consulted who suggested that patient has a  chronic sigmoid stricture not traversable by colonoscopy and advised to continue on stool softeners with intermittent use of MiraLAX.     4. History of seizure disorder   - On KEPPRA 1 gm oral BID and LAMICTAL 125 mg oral BID.     5. Chronic DVT of left femoral vein  -Duplex venous ultrasound of left lower extremity from 5/13/2022 shows finding suggestive of thrombosis within the proximal to mid femoral vein. - On ELIQUIS 5 mgm oral BID. Given that she has risk factor for extension such as active cancer and proximal vein involvement, she needs to be on anticoagulation for 3 to 6 months.     6. Stage IV metastatic ovarian cancer   - Hem onc on board. Follow up recommendations. - slightly improved, no plans for chemotherapy while in-house per hemon.  - On ELIQUIS for anticoagulation. 7.  Lower extremity pitting edema LLE> RLE  -Likely secondary to ovarian mass compressing vein/previous DVT of left femoral vein. -Received 3 doses of IV Lasix 20 mg with improvement in swelling.  -Advised patient to elevate legs, walk around and work with physical and occupational therapy. 8. Leukocytosis  - Resolved. - Likely secondary to Neupogen if she received it during chemotherapy. We will check with hematology. - Follow up pan cultures to rule out infection. Blood cultures show no growth so far. Urine and respiratory culture pending. 9.  Hypokalemia  -Replaced with oral 40 mEq potassium bicarb citric acid effervescent tablet. Palliative care consulted. Patient is full code. Palliative care will continue to follow  her care as needed.        DVT ppx: On ELIQUIS 5 mg oral BID  GI ppx: Protonix 40 mg oral daily     PT/OT/SW: Consulted  Discharge Planning:  to assist with. Patient is accepted at [facility] and is awaiting precert. Lilia Lozano MD  Internal Medicine Resident, PGY-1  [facility];  [facility], [address]  6/13/2022, 7:21 AM

## 2022-06-13 NOTE — PROGRESS NOTES
Comprehensive Nutrition Assessment    Type and Reason for Visit:  RD Nutrition Re-Screen/LOS    Nutrition Recommendations/Plan:   1. Continue diet Regular, monitor PO intake  2. Obtain current bed scale weight  3. Monitor labs, wt, plan of care     Malnutrition Assessment:  Malnutrition Status: At risk for malnutrition (Comment) (06/13/22 1246)    Context:  Chronic Illness     Findings of the 6 clinical characteristics of malnutrition:  Energy Intake:  No significant decrease in energy intake (per pt)  Weight Loss:  No significant weight loss     Body Fat Loss:  No significant body fat loss     Muscle Mass Loss:  No significant muscle mass loss    Fluid Accumulation:  No significant fluid accumulation     Strength:  Not Performed    Nutrition Assessment:    Pt presents with fatigue and generalized weakness. PMH: stage IV metastatic ovarian cancer currently on chemotherapy with osteoblastic mets to pelvis, spine and inguinal lymph nodes, DM, HTN, anemia. Pt reports an okay appetite during stay, as well as prior to admission, eating 50% or more of meals. Reports occasional nausea, improvements with constipation,  lbs. Suggest obtaining current wt, wt not taken since admission. Nutrition Related Findings:    labs/meds reviewed. LBM 6/12. +2 RLE, +1 LLE edema noted. Wound Type: None       Current Nutrition Intake & Therapies:    Average Meal Intake: 51-75%  Average Supplements Intake: None Ordered  ADULT DIET; Regular    Anthropometric Measures:  Height: 5' 5\" (165.1 cm)  Ideal Body Weight (IBW): 125 lbs (57 kg)       Current Body Weight: 160 lb (72.6 kg), 128 % IBW. Current BMI (kg/m2): 26.6  Usual Body Weight: 165 lb 9.6 oz (75.1 kg) (3/25 per EMR)  % Weight Change (Calculated): -3.4  Weight Adjustment For: No Adjustment                 BMI Categories: Overweight (BMI 25.0-29. 9)    Estimated Daily Nutrient Needs:  Energy Requirements Based On: Kcal/kg  Weight Used for Energy Requirements: Current  Energy (kcal/day): 6341-1811 kcals/day (25-28 kcal/kg)  Weight Used for Protein Requirements: Ideal  Protein (g/day): 70-85 g/day (1.2-1.5 g/kg)  Method Used for Fluid Requirements: ml/Kg (30 mL/kg)  Fluid (ml/day): 2200 mL/day or per MD    Nutrition Diagnosis:   No nutrition diagnosis at this time     Nutrition Interventions:   Food and/or Nutrient Delivery: Continue Current Diet  Nutrition Education/Counseling: No recommendation at this time  Coordination of Nutrition Care: Continue to monitor while inpatient       Nutrition Monitoring and Evaluation:   Behavioral-Environmental Outcomes: None Identified  Food/Nutrient Intake Outcomes: Food and Nutrient Intake  Physical Signs/Symptoms Outcomes: Biochemical Data,Nutrition Focused Physical Findings,Weight,GI Status,Nausea or Vomiting    Discharge Planning:    No discharge needs at this time     Yoselyn Perez, [address]: [phone]

## 2022-06-13 NOTE — PLAN OF CARE
Problem: Chronic Conditions and Co-morbidities  Goal: Patient's chronic conditions and co-morbidity symptoms are monitored and maintained or improved  6/13/2022 1453 by Celeste Castro RN  Outcome: Completed  Flowsheets (Taken 6/13/2022 0900 by Grazyna Lr RN)  Care Plan - Patient's Chronic Conditions and Co-Morbidity Symptoms are Monitored and Maintained or Improved: Monitor and assess patient's chronic conditions and comorbid symptoms for stability, deterioration, or improvement  6/13/2022 0502 by Jazlyn Lucio RN  Outcome: Progressing  Flowsheets (Taken 6/12/2022 2100)  Care Plan - Patient's Chronic Conditions and Co-Morbidity Symptoms are Monitored and Maintained or Improved: Monitor and assess patient's chronic conditions and comorbid symptoms for stability, deterioration, or improvement     Problem: Pain  Goal: Verbalizes/displays adequate comfort level or baseline comfort level  6/13/2022 1453 by Celeste Castro RN  Outcome: Completed  6/13/2022 0502 by Jazlyn Lucio RN  Outcome: Progressing     Problem: Skin/Tissue Integrity  Goal: Absence of new skin breakdown  Description: 1. Monitor for areas of redness and/or skin breakdown  2. Assess vascular access sites hourly  3. Every 4-6 hours minimum:  Change oxygen saturation probe site  4. Every 4-6 hours:  If on nasal continuous positive airway pressure, respiratory therapy assess nares and determine need for appliance change or resting period.   6/13/2022 1453 by Celeste Castro RN  Outcome: Completed  6/13/2022 0502 by Jazlyn Lucio RN  Outcome: Progressing     Problem: Safety - Adult  Goal: Free from fall injury  6/13/2022 1453 by Celeste Castro RN  Outcome: Completed  Flowsheets  Taken 6/13/2022 0900 by Grazyna Lr RN  Free From Fall Injury: Instruct family/caregiver on patient safety  Taken 6/13/2022 0834 by Celeste Castro RN  Free From Fall Injury: Instruct family/caregiver on patient safety  6/13/2022 0502 by Jazlyn Lucio RN  Outcome: Progressing     Problem: ABCDS Injury Assessment  Goal: Absence of physical injury  6/13/2022 1453 by Leyda Carson RN  Outcome: Completed  Flowsheets  Taken 6/13/2022 0900 by Claudia Enriquez RN  Absence of Physical Injury: Implement safety measures based on patient assessment  Taken 6/13/2022 0834 by Leyda Carson RN  Absence of Physical Injury: Implement safety measures based on patient assessment  6/13/2022 0502 by Veronica Goss RN  Outcome: Progressing  Flowsheets (Taken 6/12/2022 2000)  Absence of Physical Injury: Implement safety measures based on patient assessment     Problem: Hematologic - Adult  Goal: Maintains hematologic stability  6/13/2022 1453 by Leyda Carson RN  Outcome: Completed  Flowsheets (Taken 6/13/2022 0900 by Claudia Enriquez RN)  Maintains hematologic stability: Assess for signs and symptoms of bleeding or hemorrhage  6/13/2022 0502 by Veronica Goss RN  Outcome: Progressing  Flowsheets (Taken 6/12/2022 2100)  Maintains hematologic stability: Assess for signs and symptoms of bleeding or hemorrhage

## 2022-06-13 NOTE — CARE COORDINATION
Transitional Planning:  Call to [facility] at [address]. Reached vm. Asked for call back. Call to [facility]. Left message with director of nursing Lisa Lilly  For call back. 10:50  Received call back from [facility] who states the 8:13 email from this am states that precert is still under review. 11:00  Received call from Siobhan Henson who states precert is still pending and insurance informed her that they have 14 days for the review. 14:10  Phone call from Neeraj Talavera at [address]. They have precert. Transport set for 8:30 pm.  [facility]. [facility] form faxed. Notified Siobhan Henson of transport time. Report number [phone]. Fax:  [phone].    15:20  Spoke with pt re: transport time. Pt now states she wanted [email or web address] [place]. Informed pt that per prior CM she was agreeable. Pt now states she looked at reviews and wants to go to [email or web address]. CM informed pt that we do not have acceptance from [facility] and transport is arranged for [facility]. Explained that she can appeal discharge but if appeal is upheld, she may be responsible for bill. 15:30  SEDRICK complete. ABY and SEDRICK faxed to [facility].

## 2022-06-13 NOTE — PROGRESS NOTES
At approximately 00:05am writer received call on patient's call light from the patient stating that she fell while trying to get back into bed after using the commode. Patient has been doing standing pivots on their own to the commode without any issues over the past 24 hours. Writer and another staff RN came into patient's room and checked patient for any visual injuries and found none. Patient stated that she did not hit her head and has not injuries or pain at this time. Kierstenr and other staff nurse assisted patient back to bed and educated her on the importance of calling out to get to and from the commode from now on. Patient education on fall risks and prevention. All patient questions answered and patient needs were addressed at this time. Writer notified primary care team and house supervisor of the event.

## 2022-06-13 NOTE — PROGRESS NOTES
Physical Therapy  Facility/Department: [facility]  Physical Therapy Initial Assessment    Name: Dmitriy Reyes  : 1965  MRN: [id number]  Date of Service: 2022    Discharge Recommendations:  Patient would benefit from continued therapy after discharge   PT Equipment Recommendations  Equipment Needed: No      Patient Diagnosis(es): The primary encounter diagnosis was Anemia, unspecified type. Diagnoses of Grief at loss of child, Recurrent major depressive disorder, in partial remission ([facility] [address] Malignant neoplasm of ovary, unspecified laterality ([facility] [address] and Iron deficiency anemia secondary to inadequate dietary iron intake were also pertinent to this visit. Assessment   Body Structures, Functions, Activity Limitations Requiring Skilled Therapeutic Intervention: Decreased functional mobility ; Decreased ROM; Decreased body mechanics; Decreased endurance;Decreased balance  Assessment: Pt sup to sit with supervision, sat for 15 mins for ther ex, L<>R for hygiene, pt  tolerated 15 mins at EOB;  Pt  will benefit from furhter PT to progress activity and promote improved fucntionl abilty needed to retur home at highest level of mobilty  Specific Instructions for Next Treatment: progress activity and progress gait  Therapy Prognosis: Guarded  Decision Making: Medium Complexity  Clinical Presentation: progressing  Requires PT Follow-Up: Yes  Activity Tolerance  Activity Tolerance: Patient limited by fatigue;Patient limited by endurance  Activity Tolerance Comments: Pt refused dt \" shaky legs\"     Plan   Plan  Plan: 5-7 times per week (5-6x week)  Specific Instructions for Next Treatment: progress activity and progress gait  Current Treatment Recommendations: Strengthening,ROM,Balance training,Functional mobility training,Transfer training,Endurance training,Stair training,Neuromuscular re-education,Home exercise program  Safety Devices  Type of Devices: Gait belt,Left in bed,Call light within reach,Nurse notified,Bed alarm in place  Restraints  Restraints Initially in Place: No     Restrictions  Restrictions/Precautions  Restrictions/Precautions: Seizure,Fall Risk,Up as Tolerated,Bed Alarm  Required Braces or Orthoses?: No  Position Activity Restriction  Other position/activity restrictions: History of seizure     Subjective   Pain: Pt complaint of 7/10 abdominal pain with minimal relief after pain medication, pt deferred amb, aggreable to bed mob and seated ther ex  General  Chart Reviewed: Yes  Patient assessed for rehabilitation services?: Yes  Additional Pertinent Hx: The patient is a pleasant 59 y.o. female  with significant past medical history of stage IV ovarian cancer currently on chemotherapy presents with fatigue and generalized weakness  Response To Previous Treatment: Patient with no complaints from previous session. Family / Caregiver Present: No  Follows Commands: Within Functional Limits  Other (Comment): Pt awake and alert in agreement with PT intervention  General Comment  Comments: Pt and RN aggreablel to therapy, pt refused amb stated legs are weak  Subjective  Subjective: RN and pt agreeable to PT ,  requires some encouragement for participation with good return. Cognition   Orientation  Overall Orientation Status: Within Functional Limits  Orientation Level: Oriented to place;Oriented to time;Oriented to person  Cognition  Overall Cognitive Status: Exceptions  Arousal/Alertness: Delayed responses to stimuli  Following Commands:  Follows multistep commands with repitition  Attention Span: Attends with cues to redirect  Memory: Appears intact  Safety Judgement: Decreased awareness of need for safety  Insights: Decreased awareness of deficits  Initiation: Requires cues for some  Sequencing: Requires cues for some   Bed mobility  Rolling to Left: Supervision (for hygiene)  Rolling to Right: Supervision  Supine to Sit: Stand by assistance  Sit to Supine: Stand by assistance  Scooting: Stand by assistance  Bed Mobility Comments: HOB elevated, utilizing bed rails  Transfers  Sit to Stand: Unable to assess  Stand to sit: Unable to assess  Bed to Chair: Unable to assess  Comment: Pt refused standing  Ambulation  WB Status: Pt refused     Balance  Posture: Good  Sitting - Static: Good  Sitting - Dynamic: Good  Comments: Pt ref standing and amb  Exercise Treatment:   Seated LE exercise program: Long Arc Quads, hip abduction/adduction, heel/toe raises, and marches. Reps: 20      AM-PAC Score  AM-PAC Inpatient Mobility Raw Score : 16 (06/13/22 1410)  AM-PAC Inpatient T-Scale Score : 40.78 (06/13/22 1410)  Mobility Inpatient CMS 0-100% Score: 54.16 (06/13/22 1410)  Mobility Inpatient CMS G-Code Modifier : CK (06/13/22 1410)       Goals  Short Term Goals  Time Frame for Short term goals: 5 visits  Short term goal 1: Pt to ambualte 25 ft SBA with RW  Short term goal 2: Pt to complete functional transfers mod I with rolling walker  Long Term Goals  Time Frame for Long term goals : 14 visits  Long term goal 1: Pt to ambulate 100 ft with RW mod I  Long term goal 2: Pt to ascend /descend 5 steps mod I  Patient Goals   Patient goals : to walk and gain strength       Education  Patient Education  Education Given To: Patient  Education Provided: Role of Therapy;Plan of Care;Precautions;Transfer Training;Energy Conservation;Equipment; Fall Prevention Strategies; Home Exercise Program  Education Provided Comments: HEP  Education Method: Demonstration;Verbal  Barriers to Learning: None  Education Outcome: Verbalized understanding      Therapy Time   Individual Concurrent Group Co-treatment   Time In 1119         Time Out 1145         Minutes Kristin Skaggs, PTA

## 2022-06-15 NOTE — DISCHARGE SUMMARY
[facility]     Department of Internal Medicine - Staff Internal Medicine Teaching Service    INPATIENT DISCHARGE SUMMARY      Patient Identification:  Radha Glasgow is a 59 y.o. female. :  1965  MRN: [id number]     Acct: [de-identified]   PCP: Emerson Richardson DO  Admit Date:  6/3/2022  Discharge date and time: 2022  9:09 PM   Attending Provider: Rachel att. providers found                                     [address] Problem Lists:  Principal Problem:    Anemia  Active Problems:    Iron deficiency    ACP (advance care planning)    Seizure ([address]    Fatigue    Chronic deep vein thrombosis (DVT) of femoral vein of left lower extremity ([facility] [address]    Gynecologic cancer [facility])  Resolved Problems:    Constipation    Hypokalemia      HOSPITAL STAY     Brief Inpatient course:   Radha Glasgow is a 59 y.o. female  with significant past medical history of stage IV ovarian cancer currently on chemotherapy presents with fatigue and generalized weakness.  Patient also had constipation for over past 4 days since admission.  She denied nausea, vomiting but did complain of mild abdominal pain in the periumbilical region of 4/20 intensity for a few months.     Patient states that she has missed her chemotherapy on 6/3/2022 due to generalized weakness.  On arrival to the ED hemoglobin was checked and it was 6 after which she  received 1 unit PRBC.  CT abdomen pelvis showed colonic distention consistent with chronic ileus versus chronic obstruction.  Patient was initially placed in observation unit for GI consult and now has been admitted to internal medicine service for further management.       GI suggested that the chronic sigmoid stricture is not traversable by colonoscopy and advised to continue on stool softeners with intermittent use of MiraLAX.  GI signed off.     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     Echo from 2/22/2022 shows LVEF of 55%. Global left ventricular systolic function is normal.    Initial hemoglobin was 6.3 after which she received 1 unit PRBC and it trended up to 8.1.  Hemoglobin trended down to 7.3 and 6.8 later with another unit of PRBC transfusion. Hb trended up to 8.2. Stool occult positive from 6/6/2022. No intervention as per GI. Hb stable. 1.  Severe anemia related to chemotherapy/myelosuppression at the time of anemia of chronic disease/cancer   - Fatigue and weakness secondary to anemia.  - Initial hemoglobin was 6.3 after which she received 1 unit PRBC on 6/3/2022 and it trended up to 8.1.  Hemoglobin trended down to 7.3 and 6.8 on 6/5/2022 for which she received second unit of PRBC. Currently hemoglobin is stable at  8.4-->8.2-->7.6>7.9>8.1>7.8  - Transfused if Hb < 7.     2. Iron deficiency   - Iron level 8 on 6/4/2022. Normal MCV (in contrast to low MCV) and elevated RDW. - Received VENOFER  mg every 24 hours for 4 doses.     3. Constipation   - Resolved. -  CT abdomen pelvis on 6/3/2022 showed colonic distention consistent with chronic ileus versus chronic obstruction. - GI consulted who suggested that patient has a  chronic sigmoid stricture not traversable by colonoscopy and adcvised to continue on stool softeners with intermittent use of MiraLAX. -Patient had diarrhea after using stool softeners and MiraLAX for which the laxatives were held.     4. History of seizure disorder   - On KEPPRA 1 gm oral BID and LAMICTAL 125 mg oral BID.     5. Chronic DVT of left femoral vein  -Duplex venous ultrasound of left lower extremity from 5/13/2022 shows finding suggestive of thrombosis within the proximal to mid femoral vein. - On ELIQUIS 5 mgm oral BID.  Given that she has risk factor for extension such as active cancer and proximal vein involvement, she needs to be on anticoagulation for 5 months.     6. Stage IV metastatic ovarian cancer   - Hem onc on board. Patient was followed by   - slightly improved, no plans for chemotherapy while in-house per [facility].  - On ELIQUIS for anticoagulation.      7. Lower extremity pitting edema LLE> RLE  -Likely secondary to ovarian mass compressing vein/previous DVT of left femoral vein. -Received 3 doses of IV Lasix 20 mg with improvement in swelling.  -Advised patient to elevate legs, walk around and work with physical and occupational therapy.     8. Leukocytosis  - Resolved. - Follow up pan cultures to rule out infection. Blood cultures show no growth so far.      9. Hypokalemia  -Replaced with oral 40 mEq potassium bicarb citric acid effervescent tablet.     Palliative care consulted. Patient is full code. Palliative care will continue to follow her care as needed.        DVT ppx: On ELIQUIS 5 mg oral BID  GI ppx: Protonix 40 mg oral daily     PT/OT/SW: Followed    Procedures/ Significant Interventions:    None    Consults:     Consults:     Final Specialist Recommendations/Findings:   IP CONSULT TO IV TEAM  IP CONSULT TO ONCOLOGY  IP CONSULT TO HOSPITALIST  IP CONSULT TO GI  IP CONSULT TO SURGICAL CRITICAL CARE TEAM  IP CONSULT TO IV TEAM  IP CONSULT TO IV TEAM  IP CONSULT TO PALLIATIVE CARE  IP CONSULT TO SPIRITUAL SERVICES      Any Hospital Acquired Infections: none    Discharge Functional Status:  stable    DISCHARGE PLAN     Disposition: Lucy Martines    Patient Instructions:   Discharge Medication List as of 6/13/2022  6:09 PM      START taking these medications    Details   polyethylene glycol (GLYCOLAX) 17 g packet Take 17 g by mouth 2 times daily, Disp-527 g, R-1Hold for diarrhea. Normal         CONTINUE these medications which have CHANGED    Details   bisacodyl (DULCOLAX) 10 MG suppository Place 1 suppository rectally daily as needed for Constipation, Disp-30 suppository, R-0Hold if patient has diarrhea. Normal      QUEtiapine (SEROQUEL) 25 MG tablet Take 1 tablet by mouth nightly, Disp-60 tablet, R-3Normal      lamoTRIgine (LAMICTAL) 25 MG tablet Take 3 tablets by mouth 2 times daily, Disp-300 tablet, R-3Normal         CONTINUE these medications which have NOT CHANGED    Details   !! morphine (MS CONTIN) 30 MG extended release tablet TAKE 1 TABLET BY MOUTH 2 TIMES DAILY FOR 30 DAYS., Disp-60 tablet, R-0Normal      !! morphine (MS CONTIN) 15 MG extended release tablet TAKE 1 TABLET BY MOUTH 2 TIMES DAILY FOR 30 DAYS., Disp-60 tablet, R-0Normal      LORazepam (ATIVAN) 1 MG tablet Take 1 tablet by mouth every 8 hours as needed for Anxiety for up to 30 doses. , Disp-90 tablet, R-0Normal      ondansetron (ZOFRAN-ODT) 4 MG disintegrating tablet Take 1 tablet by mouth every 8 hours as needed for Nausea or Vomiting, Disp-60 tablet, R-2Normal      pantoprazole (PROTONIX) 40 MG tablet TAKE 1 TABLET BY MOUTH DAILY, Disp-60 tablet, R-0Normal      senna (SENOKOT) 8.6 MG tablet Take 1 tablet by mouth 2 times daily, Disp-60 tablet, R-11Normal      melatonin 5 MG TABS tablet Take 1 tablet by mouth daily, Disp-30 tablet, R-3Normal      benzonatate (TESSALON PERLES) 100 MG capsule Take 1 capsule by mouth 3 times daily as needed for Cough, Disp-30 capsule, R-1Normal      apixaban (ELIQUIS) 5 MG TABS tablet Take 1 tablet by mouth 2 times daily, Disp-60 tablet, R-5Normal      sertraline (ZOLOFT) 100 MG tablet TAKE 1 TABLET BY MOUTH DAILY, Disp-30 tablet, R-4Normal      levETIRAcetam (KEPPRA) 1000 MG tablet Take 1 tablet by mouth 2 times daily, Disp-60 tablet, R-3Normal      hydrocortisone (ANUSOL-HC) 2.5 % CREA rectal cream Apply on affected area twice daily, Disp-1 each, R-1, Normal      ferrous sulfate (IRON 325) 325 (65 Fe) MG tablet Take 1 tablet by mouth daily (with breakfast), Disp-30 tablet, R-2Normal       !! - Potential duplicate medications found. Please discuss with provider.       STOP taking these medications       oxyCODONE-acetaminophen (PERCOCET) 5-325 MG per tablet Comments:   Reason for Stopping:               Activity: activity as tolerated    Diet: regular diet    Follow-up:    DO Khoa [facility] [address]  [address]  [phone]    Schedule an appointment as soon as possible for a visit in 1 week      MD Artemio Munoz [address]  [address] [facility] [address] [phone]    Schedule an appointment as soon as possible for a visit in 2 weeks      [facility]  [address] [facility] [address] [place] [address]  [phone]  Schedule an appointment as soon as possible for a visit in 4 weeks        Patient Instructions:   Monitor for signs of bleeding. Check CBC in 3 days and then weekly after. Seroquel decreased to 25 mg from 75 mg at night. Take Miralax twice daily and hold it if there is any diarrhea. Follow with palliative as outpatient. Follow with PCP in 1 week time  Follow with heme/onc for further management. Follow-up labs: CBC  Follow-up imaging: None    Note that over 30 minutes was spent in preparing discharge papers, discussing discharge with patient, medication review, etc.      Fay Banks MD  Internal Medicine Resident, PGY-1  [address] [place], [place]  6/15/2022, 6:56 PM

## 2022-06-17 ENCOUNTER — TELEPHONE (OUTPATIENT)
Dept: ONCOLOGY | Age: 57
End: 2022-06-17

## 2022-06-17 NOTE — TELEPHONE ENCOUNTER
Counseling referral received for patient.  called patient to schedule appointment.  to check in at Medical Oncology appointment on 6/24.

## 2022-06-17 NOTE — TELEPHONE ENCOUNTER
Name: [facility]  : 1965  MRN: [id number]    Oncology Navigation Follow-Up Note    Contact Type:  Telephone  Notes: Dr. Yobany Renteria updated on pt & inquired on f/u. Dr. Yobany Renteria requested f/u scheduled next week. Spoke with Gallo [facility] , updated on pt & requested appt. Pt scheduled  @ 1130. Pt updated on appt details & verbalized understanding. Pt denied questions/concerns. Instructed pt may contact writer gabriela. Spoke with [address] , updated on  Dr. Yobany Renteria appt details. Will continue to follow.      Electronically signed by Neptali Toney RN on 2022 at 11:22 AM

## 2022-06-24 ENCOUNTER — HOSPITAL ENCOUNTER (OUTPATIENT)
Age: 57
Discharge: HOME | End: 2022-06-24
Payer: COMMERCIAL

## 2022-06-24 ENCOUNTER — OFFICE VISIT (OUTPATIENT)
Dept: ONCOLOGY | Age: 57
End: 2022-06-24
Payer: COMMERCIAL

## 2022-06-24 ENCOUNTER — TELEPHONE (OUTPATIENT)
Dept: ONCOLOGY | Age: 57
End: 2022-06-24

## 2022-06-24 ENCOUNTER — HOSPITAL ENCOUNTER (OUTPATIENT)
Dept: INFUSION THERAPY | Age: 57
Discharge: HOME | End: 2022-06-24

## 2022-06-24 VITALS
TEMPERATURE: 96.9 F | HEART RATE: 96 BPM | SYSTOLIC BLOOD PRESSURE: 138 MMHG | BODY MASS INDEX: 29.55 KG/M2 | WEIGHT: 177.6 LBS | DIASTOLIC BLOOD PRESSURE: 76 MMHG

## 2022-06-24 DIAGNOSIS — C79.51 CANCER, METASTATIC TO BONE (HCC): ICD-10-CM

## 2022-06-24 DIAGNOSIS — C56.9 MALIGNANT NEOPLASM OF OVARY, UNSPECIFIED LATERALITY (HCC): Primary | ICD-10-CM

## 2022-06-24 DIAGNOSIS — C56.9 MALIGNANT NEOPLASM OF OVARY, UNSPECIFIED LATERALITY (HCC): ICD-10-CM

## 2022-06-24 DIAGNOSIS — C79.60 MALIGNANT NEOPLASM METASTATIC TO OVARY, UNSPECIFIED LATERALITY (HCC): Primary | ICD-10-CM

## 2022-06-24 DIAGNOSIS — I89.0 LYMPHEDEMA: ICD-10-CM

## 2022-06-24 LAB
ABSOLUTE EOS #: 0.14 K/UL (ref 0–0.4)
ABSOLUTE LYMPH #: 1.35 K/UL (ref 1–4.8)
ABSOLUTE MONO #: 0.43 K/UL (ref 0.1–0.8)
ALBUMIN SERPL-MCNC: 2.9 G/DL (ref 3.5–5.2)
ALBUMIN/GLOBULIN RATIO: 1.1 (ref 1–2.5)
ALP BLD-CCNC: 110 U/L (ref 35–104)
ALT SERPL-CCNC: 9 U/L (ref 5–33)
ANION GAP SERPL CALCULATED.3IONS-SCNC: 7 MMOL/L (ref 9–17)
AST SERPL-CCNC: 15 U/L
BASOPHILS # BLD: 0 % (ref 0–2)
BASOPHILS ABSOLUTE: 0 K/UL (ref 0–0.2)
BILIRUB SERPL-MCNC: <0.1 MG/DL (ref 0.3–1.2)
BUN BLDV-MCNC: 6 MG/DL (ref 6–20)
CA 125: 411 U/ML
CALCIUM SERPL-MCNC: 8 MG/DL (ref 8.6–10.4)
CHLORIDE BLD-SCNC: 101 MMOL/L (ref 98–107)
CO2: 30 MMOL/L (ref 20–31)
CREAT SERPL-MCNC: <0.4 MG/DL (ref 0.5–0.9)
EOSINOPHILS RELATIVE PERCENT: 2 % (ref 1–4)
GFR AFRICAN AMERICAN: ABNORMAL ML/MIN
GFR NON-AFRICAN AMERICAN: ABNORMAL ML/MIN
GFR SERPL CREATININE-BSD FRML MDRD: ABNORMAL ML/MIN/{1.73_M2}
GLUCOSE BLD-MCNC: 99 MG/DL (ref 70–99)
HCT VFR BLD CALC: 31.1 % (ref 36–46)
HEMOGLOBIN: 9.3 G/DL (ref 12–16)
LYMPHOCYTES # BLD: 19 % (ref 24–44)
MCH RBC QN AUTO: 28 PG (ref 26–34)
MCHC RBC AUTO-ENTMCNC: 29.8 G/DL (ref 31–37)
MCV RBC AUTO: 94 FL (ref 80–100)
MONOCYTES # BLD: 6 % (ref 1–7)
MORPHOLOGY: ABNORMAL
MORPHOLOGY: ABNORMAL
PDW BLD-RTO: 20 % (ref 12.5–15.4)
PLATELET # BLD: 707 K/UL (ref 140–450)
PMV BLD AUTO: 5.7 FL (ref 6–12)
POTASSIUM SERPL-SCNC: 3.7 MMOL/L (ref 3.7–5.3)
RBC # BLD: 3.31 M/UL (ref 4–5.2)
SEG NEUTROPHILS: 73 % (ref 36–66)
SEGMENTED NEUTROPHILS ABSOLUTE COUNT: 5.18 K/UL (ref 1.8–7.7)
SODIUM BLD-SCNC: 138 MMOL/L (ref 135–144)
TOTAL PROTEIN: 5.6 G/DL (ref 6.4–8.3)
WBC # BLD: 7.1 K/UL (ref 3.5–11)

## 2022-06-24 PROCEDURE — G8417 CALC BMI ABV UP PARAM F/U: HCPCS | Performed by: INTERNAL MEDICINE

## 2022-06-24 PROCEDURE — 80053 COMPREHEN METABOLIC PANEL: CPT

## 2022-06-24 PROCEDURE — G8427 DOCREV CUR MEDS BY ELIG CLIN: HCPCS | Performed by: INTERNAL MEDICINE

## 2022-06-24 PROCEDURE — 86304 IMMUNOASSAY TUMOR CA 125: CPT

## 2022-06-24 PROCEDURE — 99214 OFFICE O/P EST MOD 30 MIN: CPT | Performed by: INTERNAL MEDICINE

## 2022-06-24 PROCEDURE — 99211 OFF/OP EST MAY X REQ PHY/QHP: CPT | Performed by: INTERNAL MEDICINE

## 2022-06-24 PROCEDURE — 4004F PT TOBACCO SCREEN RCVD TLK: CPT | Performed by: INTERNAL MEDICINE

## 2022-06-24 PROCEDURE — 1111F DSCHRG MED/CURRENT MED MERGE: CPT | Performed by: INTERNAL MEDICINE

## 2022-06-24 PROCEDURE — 85025 COMPLETE CBC W/AUTO DIFF WBC: CPT

## 2022-06-24 PROCEDURE — 3017F COLORECTAL CA SCREEN DOC REV: CPT | Performed by: INTERNAL MEDICINE

## 2022-06-24 RX ORDER — HEPARIN SODIUM (PORCINE) LOCK FLUSH IV SOLN 100 UNIT/ML 100 UNIT/ML
500 SOLUTION INTRAVENOUS PRN
OUTPATIENT
Start: 2022-06-24

## 2022-06-24 RX ORDER — POTASSIUM CHLORIDE 750 MG/1
10 CAPSULE, EXTENDED RELEASE ORAL DAILY
COMMUNITY
End: 2022-08-01

## 2022-06-24 RX ORDER — SODIUM CHLORIDE 0.9 % (FLUSH) 0.9 %
20 SYRINGE (ML) INJECTION PRN
Status: DISCONTINUED | OUTPATIENT
Start: 2022-06-24 | End: 2022-06-25 | Stop reason: HOSPADM

## 2022-06-24 RX ORDER — HEPARIN SODIUM (PORCINE) LOCK FLUSH IV SOLN 100 UNIT/ML 100 UNIT/ML
500 SOLUTION INTRAVENOUS PRN
Status: DISCONTINUED | OUTPATIENT
Start: 2022-06-24 | End: 2022-06-25 | Stop reason: HOSPADM

## 2022-06-24 RX ORDER — SODIUM CHLORIDE 0.9 % (FLUSH) 0.9 %
10 SYRINGE (ML) INJECTION PRN
OUTPATIENT
Start: 2022-06-24

## 2022-06-24 RX ORDER — MORPHINE SULFATE 30 MG/1
30 TABLET, FILM COATED, EXTENDED RELEASE ORAL 2 TIMES DAILY
COMMUNITY
End: 2022-07-19

## 2022-06-24 RX ORDER — OXYCODONE HYDROCHLORIDE AND ACETAMINOPHEN 5; 325 MG/1; MG/1
1 TABLET ORAL EVERY 4 HOURS PRN
COMMUNITY
End: 2022-07-08 | Stop reason: SDUPTHER

## 2022-06-24 RX ORDER — FUROSEMIDE 20 MG/1
20 TABLET ORAL DAILY
COMMUNITY
End: 2022-08-01

## 2022-06-24 RX ORDER — SODIUM CHLORIDE 0.9 % (FLUSH) 0.9 %
20 SYRINGE (ML) INJECTION PRN
OUTPATIENT
Start: 2022-06-24

## 2022-06-24 RX ORDER — LOPERAMIDE HYDROCHLORIDE 2 MG/1
2 CAPSULE ORAL 4 TIMES DAILY PRN
COMMUNITY

## 2022-06-24 RX ADMIN — Medication 30 ML: at 11:20

## 2022-06-24 RX ADMIN — HEPARIN SODIUM (PORCINE) LOCK FLUSH IV SOLN 100 UNIT/ML 500 UNITS: 100 SOLUTION at 11:22

## 2022-06-24 NOTE — PROGRESS NOTES
_      Chief Complaint   Patient presents with    Follow-up     review status of disease     DIAGNOSIS:       Recurrent ovarian cancer. Original diagnosis 2005 with multiple recurrences. Diffuse metastasis. CURRENT THERAPY:         Doxil/ Avastin started 9/18/2020. Avastin was discontinued due to recent GI bleeding. Status post recent vascular embolization  S/p seizure disorder. Gemzar started 2/26/2021. Interrupted due to hospitalization and problem with compliance. Evidence of disease progression on CT scan 2/22/22  Add Carboplatin to Gemzar March 2022      BRIEF CASE HISTORY:      Ms. Cherry Mejía is a very pleasant 59 y.o. female with history of multiple co morbidities as listed. The patient seen in consultation for ovarian cancer with multiple recurrences. 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 She had surgical debulking and she had systemic treatment with Taxol and carboplatin for 6 cycles. Patient was in remission for about 2 years. She had a relapse in 2007 and treated with topotecan with good results. Patient relapsed again in 2008 and was treated with Taxol carboplatin. After 3 cycles of systemic chemotherapy she had problems with carboplatin so she finished 3 more cycles with Taxol. She did well again for 2 to 3 years until she had a relapse in 2012 and she had intraperitoneal chemotherapy treatment with Taxol. Patient was last seen by her oncologist in 2014 at which time she had relatively stable disease with normal tumor marker and no significant abnormal images. Patient moved to [place] after that and she was lost for oncology follow-ups. Patient was recently evaluated again here in [place] because of abdominal discomfort. Re imaging confirmed metastatic relapse. Biopsy confirmed ovarian cancer recurrence.  Scan showed extensive intraabdominal and splenic involvement and lung mets. She has no symptoms at the present time. Patient denies smoking or alcohol drinking.l    INTERIM HISTORY:    patient is seen for follow up recurrent ovarian cancer. Started on Gemzar. Treatment was inturrupted due to sickness and hospitalization. Recently hospitalized again with LE edema. Currently in rehab. No melena or hematochezia. No hematemesis. No recent seizure activities. Labs are stable as well. No severe anemia. No active bleeding. PAST MEDICAL HISTORY: has a past medical history of Anemia, Bleeding, Cervical cancer ([facility] [address] Depression, Diabetes mellitus ([facility] [address] GERD (gastroesophageal reflux disease), Hx of blood clots, Hypertension, Metastatic cancer ([facility] [address] Ovarian cancer ([facility] [address] Post chemo evaluation, and Splenic lesion. PAST SURGICAL HISTORY: has a past surgical history that includes Hysterectomy, total abdominal; Port Surgery; Tonsillectomy; IR PORT PLACEMENT > 5 YEARS (08/24/2020); Anus surgery; Abscess Drainage (2013); colectomy (03/2013); IR EMBOLIZATION HEMORRHAGE (10/05/2020); and Cardiac catheterization. CURRENT MEDICATIONS:  has a current medication list which includes the following prescription(s): furosemide, loperamide, morphine, oxycodone-acetaminophen, potassium chloride, morphine, polyethylene glycol, lamotrigine, ondansetron, pantoprazole, senna, melatonin, apixaban, sertraline, levetiracetam, hydrocortisone, ferrous sulfate, bisacodyl, and benzonatate. ALLERGIES:  is allergic to ceftriaxone. FAMILY HISTORY: Negative for any hematological or oncological conditions. SOCIAL HISTORY:  reports that she has been smoking cigarettes. She has a 20.00 pack-year smoking history. She has never used smokeless tobacco. She reports previous alcohol use. She reports that she does not use drugs. REVIEW OF SYSTEMS:     · General: No weakness or fatigue. No unanticipated weight loss or decreased appetite.  No fever or chills. · Eyes: No blurred vision, eye pain or double vision. · Ears: No hearing problems or drainage. No tinnitus. · Throat: No sore throat, problems with swallowing or dysphagia. · Respiratory: No cough, sputum or hemoptysis. No shortness of breath. No pleuritic chest pain. · Cardiovascular: No chest pain, orthopnea or PND. No lower extremity edema. No palpitation. · Gastrointestinal: As above. · Genitourinary: No dysuria, hematuria, frequency or urgency. · Musculoskeletal: No muscle aches or pains. No limitation of movement. No back pain. No gait disturbance, No joint complaints. · Dermatologic: No skin rashes or pruritus. No skin lesions or discolorations. · Psychiatric: No depression, anxiety, or stress or signs of schizophrenia. No change in mood or affect. · Hematologic: No history of bleeding tendency. No bruises or ecchymosis. No history of clotting problems. · Infectious disease: No fever, chills or frequent infections. · Endocrine: No polydipsia or polyuria. No temperature intolerance. · Neurologic: No headaches or dizziness. No weakness or numbness of the extremities. No changes in balance, coordination,  memory, mentation, behavior. · Allergic/Immunologic: No nasal congestion or hives. No repeated infections. PHYSICAL EXAM:  The patient is not in acute distress. Vital signs: Blood pressure 138/76, pulse 96, temperature 96.9 °F (36.1 °C), temperature source Temporal, weight 177 lb 9.6 oz (80.6 kg).      General appearance - well appearing, not in pain or distress  Mental status - good mood, alert and oriented  Eyes - pupils equal and reactive, extraocular eye movements intact  Ears - bilateral TM's and external ear canals normal  Nose - normal and patent, no erythema, discharge or polyps  Mouth - mucous membranes moist, pharynx normal without lesions  Neck - supple, no significant adenopathy  Lymphatics - no palpable lymphadenopathy, no hepatosplenomegaly  Chest - clear to auscultation, no wheezes, rales or rhonchi, symmetric air entry  Heart - normal rate, regular rhythm, normal S1, S2, no murmurs, rubs, clicks or gallops  Abdomen - soft, nontender, nondistended, no masses or organomegaly  Neurological - alert, oriented, normal speech, no focal findings or movement disorder noted  Musculoskeletal - no joint tenderness, deformity or swelling  Extremities - peripheral pulses normal, 2+ pedal edema, no clubbing or cyanosis  Skin - normal coloration and turgor, no rashes, no suspicious skin lesions noted     Review of Diagnostic data:   Lab Results   Component Value Date    WBC 7.1 06/24/2022    HGB 9.3 (L) 06/24/2022    HCT 31.1 (L) 06/24/2022    MCV 94.0 06/24/2022     (H) 06/24/2022       Chemistry        Component Value Date/Time     06/24/2022 1120    K 3.7 06/24/2022 1120     06/24/2022 1120    CO2 30 06/24/2022 1120    BUN 6 06/24/2022 1120    CREATININE <0.40 (L) 06/24/2022 1120        Component Value Date/Time    CALCIUM 8.0 (L) 06/24/2022 1120    ALKPHOS 110 (H) 06/24/2022 1120    AST 15 06/24/2022 1120    ALT 9 06/24/2022 1120    BILITOT <0.10 (L) 06/24/2022 1120          Lab Results   Component Value Date     411 (H) 06/24/2022         IMPRESSION:   Recurrent ovarian cancer. Original diagnosis 2005 with multiple recurrences. Diffuse metastasis. Recent active intra-abdominal bleeding due to splenic mass with GI infiltration. Status post embolization  S/p seizure disorder. lymphedema    PLAN:   S/p multiple hospitalization for different problems as above. Currently patient is relatively stable but in rehab with LE edema. Likely lymphedema. Refer to lymphedema clinic. RV 2-3 weeks with possible chemo if clinically stable. Patient's questions were answered to the best of her satisfaction and she verbalized full understanding and agreement.

## 2022-06-24 NOTE — FLOWSHEET NOTE
Writer visited with Patient as she was waiting for her transport. Patient shared that she is receiving rehab and noted an issue with her leg. Patient voiced her desire to keep fighting, like her son. She spoke of her grandchildren as keeping her going. She smiled and accessed her sense of humor during the conversation. She expressed gratitude for her doctor and for her nurse navigator. She thanked writer for the visit. Chaplains are available to provide emotional and spiritual support as needed. 06/24/22 1316   Encounter Summary   Encounter Overview/Reason  Spiritual/Emotional Needs   Service Provided For: Patient   Referral/Consult From: [facility] Family members; Children   Last Encounter  05/13/22   Complexity of Encounter Moderate   Begin Time 1205   End Time  1215   Total Time Calculated 10 min   Encounter    Type Follow up   Spiritual/Emotional needs   Type Spiritual Support   Assessment/Intervention/Outcome   Assessment Calm;Coping   Intervention Active listening;Sustaining Presence/Ministry of presence;Prayer (assurance of)/[place];Explored/Affirmed feelings, thoughts, concerns;Explored Coping Skills/Resources   Outcome Coping;Engaged in conversation;Expressed Gratitude   Plan and Referrals   Plan/Referrals Continue Support (comment)     Electronically signed by Janette Vazquez, Oncology Outpatient [facility] Oncology  6/24/2022  1:20 PM

## 2022-06-24 NOTE — TELEPHONE ENCOUNTER
AVS from 06/24/22     RV 2-3 weeks  Lymphedema clinic    RV scheduled for 7/15/22 at 12:45 pm   Lymphedema clinic will reach out to pt.     PT was given AVS and appt schedule

## 2022-06-24 NOTE — TELEPHONE ENCOUNTER
met with patient before Medical Oncology appointment.  Patient requesting counseling be rescheduled for 7/7 at 2:00pm.

## 2022-07-07 ENCOUNTER — INITIAL CONSULT (OUTPATIENT)
Dept: ONCOLOGY | Age: 57
End: 2022-07-07
Payer: COMMERCIAL

## 2022-07-07 DIAGNOSIS — F33.41 RECURRENT MAJOR DEPRESSIVE DISORDER, IN PARTIAL REMISSION (HCC): Primary | ICD-10-CM

## 2022-07-07 PROCEDURE — 90837 PSYTX W PT 60 MINUTES: CPT | Performed by: SOCIAL WORKER

## 2022-07-07 ASSESSMENT — PATIENT HEALTH QUESTIONNAIRE - PHQ9
7. TROUBLE CONCENTRATING ON THINGS, SUCH AS READING THE NEWSPAPER OR WATCHING TELEVISION: 1
3. TROUBLE FALLING OR STAYING ASLEEP: 3
9. THOUGHTS THAT YOU WOULD BE BETTER OFF DEAD, OR OF HURTING YOURSELF: 1
SUM OF ALL RESPONSES TO PHQ QUESTIONS 1-9: 11
8. MOVING OR SPEAKING SO SLOWLY THAT OTHER PEOPLE COULD HAVE NOTICED. OR THE OPPOSITE, BEING SO FIGETY OR RESTLESS THAT YOU HAVE BEEN MOVING AROUND A LOT MORE THAN USUAL: 0
4. FEELING TIRED OR HAVING LITTLE ENERGY: 1
10. IF YOU CHECKED OFF ANY PROBLEMS, HOW DIFFICULT HAVE THESE PROBLEMS MADE IT FOR YOU TO DO YOUR WORK, TAKE CARE OF THINGS AT HOME, OR GET ALONG WITH OTHER PEOPLE: 1
SUM OF ALL RESPONSES TO PHQ QUESTIONS 1-9: 11
SUM OF ALL RESPONSES TO PHQ QUESTIONS 1-9: 11
6. FEELING BAD ABOUT YOURSELF - OR THAT YOU ARE A FAILURE OR HAVE LET YOURSELF OR YOUR FAMILY DOWN: 1
1. LITTLE INTEREST OR PLEASURE IN DOING THINGS: 1
SUM OF ALL RESPONSES TO PHQ9 QUESTIONS 1 & 2: 4
SUM OF ALL RESPONSES TO PHQ QUESTIONS 1-9: 10
2. FEELING DOWN, DEPRESSED OR HOPELESS: 3
5. POOR APPETITE OR OVEREATING: 0

## 2022-07-07 ASSESSMENT — ANXIETY QUESTIONNAIRES
GAD7 TOTAL SCORE: 17
4. TROUBLE RELAXING: 3
IF YOU CHECKED OFF ANY PROBLEMS ON THIS QUESTIONNAIRE, HOW DIFFICULT HAVE THESE PROBLEMS MADE IT FOR YOU TO DO YOUR WORK, TAKE CARE OF THINGS AT HOME, OR GET ALONG WITH OTHER PEOPLE: SOMEWHAT DIFFICULT
3. WORRYING TOO MUCH ABOUT DIFFERENT THINGS: 2
7. FEELING AFRAID AS IF SOMETHING AWFUL MIGHT HAPPEN: 2
2. NOT BEING ABLE TO STOP OR CONTROL WORRYING: 2
5. BEING SO RESTLESS THAT IT IS HARD TO SIT STILL: 2
1. FEELING NERVOUS, ANXIOUS, OR ON EDGE: 3
6. BECOMING EASILY ANNOYED OR IRRITABLE: 3

## 2022-07-07 ASSESSMENT — COLUMBIA-SUICIDE SEVERITY RATING SCALE - C-SSRS
6. HAVE YOU EVER DONE ANYTHING, STARTED TO DO ANYTHING, OR PREPARED TO DO ANYTHING TO END YOUR LIFE?: NO
1. WITHIN THE PAST MONTH, HAVE YOU WISHED YOU WERE DEAD OR WISHED YOU COULD GO TO SLEEP AND NOT WAKE UP?: YES
2. HAVE YOU ACTUALLY HAD ANY THOUGHTS OF KILLING YOURSELF?: NO

## 2022-07-07 NOTE — PROGRESS NOTES
Oncology Psychosocial Diagnostic Assessment  RAHUL Montes   2022  1:53 PM  Christine Pope [place]  1965  [id number]    Length of session: 61    Pt was provided informed consent for Oncology Psychosocial Counseling. Discussed with patient model of service to include the limits of confidentiality (i.e. abuse reporting, suicide intervention, etc.) and short-term intervention focused approach. Pt indicated understanding. No charges for Oncology Psychosocial Counseling at this time. PRESENTING PROBLEM:  \"depressed, especially the last few months again\"  \"frustrated\"  \"uncertainty\"    LIVING SITUATION, FAMILY HX AND PERTINENT INFORMATION:  Currently at [facility] but will be discharged tomorrow after a few weeks stay. Returning home where she lives with her son. Was living in [place] until her other child passed away. LOSS, TRAUMA PAST & PRESENT: ( See PCL-5 & ACES in flow sheets)  Youngest son , another son also going through cancer treatment, reoccurrence of her cancer diagnosis    STRENGTHS AND LIMITATIONS:  Advocates for self, humor, active when able to be, empathetic, \"positive but realistic,\" goal oriented, strong bernardo, \"inspired to be strong like my son\"    SOCIAL, PEER SUPPORT, FRIENDSHIPS, MEANINGFUL ACTIVITY, COMMUNITY SUPPORT:  Family (three living children, grandchildren), wanting to get involved with [facility]/other support groups (oncology and grief)  Shinto, SPIRITUALITY:  Restorationism, \"struggle sometimes with why me\"  CULTURAL, ETHNIC ISSUES, CONCERNS:    SEXUAL HISTORY, CONCERNS:    EDUCATION HISTORY:     HISTORY OF LEANING DIFFICULTIES:    SPECIAL COMMUNICATION NEEDS:    EMPLOYMENT: (COMMENTS ON PAST / PRESENT SKILLS)  Off work about two years, on [employer].  Previously worked as geovani with a [employer]   HISTORY:  None  MENTAL HEALTH HISTORY:  Current agency or physician, diagnoses: Major depressive disorder, anxiety  Past: No previous counseling/therapy hours as needed for Pain.  potassium chloride (MICRO-K) 10 MEQ extended release capsule Take 10 mEq by mouth daily      morphine (MS CONTIN) 15 MG extended release tablet Take 1 tablet by mouth 2 times daily for 60 doses. 60 tablet 0    bisacodyl (DULCOLAX) 10 MG suppository Place 1 suppository rectally daily as needed for Constipation 30 suppository 0    polyethylene glycol (GLYCOLAX) 17 g packet Take 17 g by mouth 2 times daily 527 g 1    lamoTRIgine (LAMICTAL) 25 MG tablet Take 3 tablets by mouth 2 times daily 300 tablet 3    ondansetron (ZOFRAN-ODT) 4 MG disintegrating tablet Take 1 tablet by mouth every 8 hours as needed for Nausea or Vomiting 60 tablet 2    pantoprazole (PROTONIX) 40 MG tablet TAKE 1 TABLET BY MOUTH DAILY 60 tablet 0    senna (SENOKOT) 8.6 MG tablet Take 1 tablet by mouth 2 times daily 60 tablet 11    melatonin 5 MG TABS tablet Take 1 tablet by mouth daily 30 tablet 3    benzonatate (TESSALON PERLES) 100 MG capsule Take 1 capsule by mouth 3 times daily as needed for Cough 30 capsule 1    apixaban (ELIQUIS) 5 MG TABS tablet Take 1 tablet by mouth 2 times daily 60 tablet 5    sertraline (ZOLOFT) 100 MG tablet TAKE 1 TABLET BY MOUTH DAILY 30 tablet 4    levETIRAcetam (KEPPRA) 1000 MG tablet Take 1 tablet by mouth 2 times daily 60 tablet 3    hydrocortisone (ANUSOL-HC) 2.5 % CREA rectal cream Apply on affected area twice daily 1 each 1    ferrous sulfate (IRON 325) 325 (65 Fe) MG tablet Take 1 tablet by mouth daily (with breakfast) 30 tablet 2     No current facility-administered medications for this visit.        Social History:   Social History     Socioeconomic History    Marital status: Single     Spouse name: Not on file    Number of children: Not on file    Years of education: Not on file    Highest education level: Not on file   Occupational History    Not on file   Tobacco Use    Smoking status: Current Every Day Smoker     Packs/day: 1.00     Years: 20.00     Pack years: 20.00     Types: Cigarettes    Smokeless tobacco: Never Used   Vaping Use    Vaping Use: Never used   Substance and Sexual Activity    Alcohol use: Not Currently    Drug use: Never    Sexual activity: Not on file   Other Topics Concern    Not on file   Social History Narrative    Not on file     Social Determinants of Health     Financial Resource Strain: Medium Risk    Difficulty of Paying Living Expenses: Somewhat hard   Food Insecurity: Food Insecurity Present    Worried About Running Out of Food in the Last Year: Often true    Cierra of Food in the Last Year: Sometimes true   Transportation Needs:     Lack of Transportation (Medical): Not on file    Lack of Transportation (Non-Medical): Not on file   Physical Activity:     Days of Exercise per Week: Not on file    Minutes of Exercise per Session: Not on file   Stress:     Feeling of Stress : Not on file   Social Connections:     Frequency of Communication with Friends and Family: Not on file    Frequency of Social Gatherings with Friends and Family: Not on file    Attends Latter-day Services: Not on file    Active Member of [address] [place] [address] or Organizations: Not on file    Attends Club or Organization Meetings: Not on file    Marital Status: Not on file   Intimate Partner Violence:     Fear of Current or Ex-Partner: Not on file    Emotionally Abused: Not on file    Physically Abused: Not on file    Sexually Abused: Not on file   Housing Stability:     Unable to Pay for Housing in the Last Year: Not on file    Number of [place] in the Last Year: Not on file    Unstable Housing in the Last Year: Not on file       TOBACCO:   reports that she has been smoking cigarettes. She has a 20.00 pack-year smoking history. She has never used smokeless tobacco.  ETOH:   reports previous alcohol use.     Family History:   Family History   Problem Relation Age of Onset    Alcohol Abuse Mother     Cirrhosis Mother        INTERVENTIONS:  Discussed self-care (sleep, nutrition, rewarding activities, social support, exercise) and Established rapport      ASSESSMENT & PLAN:  Completed PHQ-9, LEWIS-7, and CSSR. Sandro Archer reports depressed mood almost every day for most of the day and has noticed increase over the last few months similar to when her son had  a few years ago. Patient reports lack of interest or motivation to do things despite knowing \"she should\" (visiting family, going back to [facility], etc.). Patient reports no suicidal ideation or plan but states she has thought about being dead. Sandro Archer states \"I couldn't do that, I have seen how death effects loved ones. \" Patient reports sleep issues with staying asleep and being fatigued often whether or not she gets sleep at night. Patient feels guilty about her son's death, her other son's cancer diagnosis, and getting sick herself despite stating \"I didn't choose any of this. \" Patient has avoided support groups or counseling due to \"knowing I will cry the whole time\" and feeling it would burden others. Patient to return to further discuss depressive symptoms, coping mechanisms, and grief.        Visit Diagnosis:  Major depressive disorder        SCHEDULED TO RETURN:    at 2:00pm

## 2022-07-08 DIAGNOSIS — C56.9 MALIGNANT NEOPLASM OF OVARY, UNSPECIFIED LATERALITY (HCC): Primary | ICD-10-CM

## 2022-07-11 ENCOUNTER — OFFICE VISIT (OUTPATIENT)
Dept: NEUROLOGY | Age: 57
End: 2022-07-11
Payer: COMMERCIAL

## 2022-07-11 VITALS
OXYGEN SATURATION: 93 % | TEMPERATURE: 97.3 F | HEART RATE: 98 BPM | WEIGHT: 155 LBS | DIASTOLIC BLOOD PRESSURE: 60 MMHG | HEIGHT: 65 IN | BODY MASS INDEX: 25.83 KG/M2 | SYSTOLIC BLOOD PRESSURE: 87 MMHG

## 2022-07-11 DIAGNOSIS — M54.59 INTRACTABLE LOW BACK PAIN: ICD-10-CM

## 2022-07-11 DIAGNOSIS — R56.9 SEIZURE (HCC): Primary | ICD-10-CM

## 2022-07-11 PROCEDURE — 1111F DSCHRG MED/CURRENT MED MERGE: CPT

## 2022-07-11 PROCEDURE — G8427 DOCREV CUR MEDS BY ELIG CLIN: HCPCS

## 2022-07-11 PROCEDURE — 4004F PT TOBACCO SCREEN RCVD TLK: CPT

## 2022-07-11 PROCEDURE — 3017F COLORECTAL CA SCREEN DOC REV: CPT

## 2022-07-11 PROCEDURE — 99215 OFFICE O/P EST HI 40 MIN: CPT

## 2022-07-11 PROCEDURE — G8417 CALC BMI ABV UP PARAM F/U: HCPCS

## 2022-07-11 RX ORDER — OXYCODONE HYDROCHLORIDE AND ACETAMINOPHEN 5; 325 MG/1; MG/1
1 TABLET ORAL EVERY 4 HOURS PRN
Qty: 180 TABLET | Refills: 0 | Status: SHIPPED | OUTPATIENT
Start: 2022-07-11 | End: 2022-08-08

## 2022-07-11 ASSESSMENT — ENCOUNTER SYMPTOMS
COUGH: 0
VOMITING: 0
DIARRHEA: 0
NAUSEA: 1
WHEEZING: 0
ABDOMINAL PAIN: 0
ABDOMINAL DISTENTION: 0
SHORTNESS OF BREATH: 0

## 2022-07-11 NOTE — PROGRESS NOTES
on her previous admission. Patient has a long history of lower back pain bilaterally. She does state that the numbness is worsened with walking and improved with laying on her side. Straight leg leg raise was negative. Patient did have decreased sensation to pinprick worse on the right up to the mid shins. Sensation in the upper limbs normal.  Patient has recently stopped taking her diabetic medication as blood glucose was controlled which may be an exacerbating factor. PMH    has a past medical history of Anemia, Bleeding, Cervical cancer ([address] Depression, Diabetes mellitus ([address] GERD (gastroesophageal reflux disease), Hx of blood clots, Hypertension, Metastatic cancer ([address] Ovarian cancer ([address] Post chemo evaluation, and Splenic lesion. PSH/SH/FMH: Remain unchanged since last visit 2/9/2022. ALLERGIES:   Allergies   Allergen Reactions    Ceftriaxone      Had a rash on ceftriaxone December 2020, [facility]       MEDICATIONS:   Current Outpatient Medications   Medication Sig Dispense Refill    furosemide (LASIX) 20 MG tablet Take 20 mg by mouth daily      loperamide (IMODIUM) 2 MG capsule Take 2 mg by mouth 4 times daily as needed for Diarrhea      morphine (MS CONTIN) 30 MG extended release tablet Take 30 mg by mouth 2 times daily.  oxyCODONE-acetaminophen (PERCOCET) 5-325 MG per tablet Take 1 tablet by mouth every 4 hours as needed for Pain.  potassium chloride (MICRO-K) 10 MEQ extended release capsule Take 10 mEq by mouth daily      morphine (MS CONTIN) 15 MG extended release tablet Take 1 tablet by mouth 2 times daily for 60 doses.  60 tablet 0    lamoTRIgine (LAMICTAL) 25 MG tablet Take 3 tablets by mouth 2 times daily 300 tablet 3    ondansetron (ZOFRAN-ODT) 4 MG disintegrating tablet Take 1 tablet by mouth every 8 hours as needed for Nausea or Vomiting 60 tablet 2    pantoprazole (PROTONIX) 40 MG tablet TAKE 1 TABLET BY MOUTH DAILY 60 tablet 0    senna (SENOKOT) 8.6 MG tablet Take 1 tablet by mouth 2 times daily 60 tablet 11    melatonin 5 MG TABS tablet Take 1 tablet by mouth daily 30 tablet 3    benzonatate (TESSALON PERLES) 100 MG capsule Take 1 capsule by mouth 3 times daily as needed for Cough 30 capsule 1    apixaban (ELIQUIS) 5 MG TABS tablet Take 1 tablet by mouth 2 times daily 60 tablet 5    levETIRAcetam (KEPPRA) 1000 MG tablet Take 1 tablet by mouth 2 times daily 60 tablet 3    hydrocortisone (ANUSOL-HC) 2.5 % CREA rectal cream Apply on affected area twice daily 1 each 1    ferrous sulfate (IRON 325) 325 (65 Fe) MG tablet Take 1 tablet by mouth daily (with breakfast) 30 tablet 2    sertraline (ZOLOFT) 100 MG tablet TAKE 1 TABLET BY MOUTH DAILY 30 tablet 4     No current facility-administered medications for this visit. PREVIOUS WORKUP:    06/2022 admission  Hg 6.3 1 uprbc    125 lamictal BID  1g keppra BID 2/20/2022     Back pain, numbness in the calves bilaterally, LBP    LABS & TESTS:      Lab Results   Component Value Date    WBC 7.1 06/24/2022    HGB 9.3 (L) 06/24/2022    HCT 31.1 (L) 06/24/2022    MCV 94.0 06/24/2022     (H) 06/24/2022       REVIEW OF SYSTEMS:     Review of Systems   Constitutional: Negative for chills and fatigue. HENT: Negative for hearing loss. Eyes: Negative for visual disturbance. Respiratory: Negative for cough, shortness of breath and wheezing. Cardiovascular: Positive for leg swelling. Negative for chest pain. Gastrointestinal: Positive for nausea. Negative for abdominal distention, abdominal pain, diarrhea and vomiting. Musculoskeletal: Positive for gait problem. Skin: Positive for rash (on shins 2/2 to lymphedema). Neurological: Positive for seizures, weakness, numbness and headaches. Negative for dizziness, tremors and light-headedness. Psychiatric/Behavioral: Positive for dysphoric mood. The patient is nervous/anxious.          VITALS  BP 87/60 (Site: Right Upper Arm, Position: Sitting, Cuff Size: Large Adult)   Pulse 98   Temp 97.3 °F (36.3 °C) (Temporal)   Ht 5' 5\" (1.651 m)   Wt 155 lb (70.3 kg)   SpO2 93%   BMI 25.79 kg/m²     PHYSICAL EXAMINATION:     Physical Exam     General appearance: cooperative  Skin: no rash or skin lesions. HEENT: normocephalic  Optic Fundi: deferred  Neck: supple, no cervcical adenopathy or carotid bruit  Lungs: clear to auscultation  Heart: Regular rate and rhythm, normal S1, S2. No murmurs, clicks or gallops.   Peripheral pulses: radial pulses palpable  Abdominal: BS present, soft, NT, ND  Extremities: no edema    NEUROLOGICAL EXAMINATION:      GENERAL  Appears comfortable and in no distress   HEENT  NC/ AT   HEART  S1 and S2 heard; palpation of pulses: radial pulse    NECK  Supple and no bruits heard   MENTAL STATUS:  Alert, oriented, intact memory, no confusion, normal speech, normal language, no hallucination or delusion   CRANIAL NERVES: II     -      Visual fields intact to confrontation  III,IV,VI -  PERR, EOMs full, no ptosis  V     -     Normal facial sensation   VII    -     Normal facial symmetry  VIII   -     Intact hearing   IX,X -     Symmetrical palate  XI    -     Symmetrical shoulder shrug  XII   -     Midline tongue, no atrophy    MOTOR FUNCTION: RUE: Significant for good strength of grade 5/5 in proximal and distal muscle groups   LUE: Significant for good strength of grade 5/5 in proximal and distal muscle groups   RLE: Significant for good strength of grade 5/5 in proximal and distal muscle groups   LLE: Significant for good strength of grade 5/5 in proximal and distal muscle groups      Normal bulk, normal tone and no involuntary movements, no tremor   SENSORY FUNCTION:  Decreased pinprick bilateral LE up to mid shin R>L   CEREBELLAR FUNCTION:  Intact fine motor control over upper limbs and lower limbs   REFLEX FUNCTION:  Symmetric in upper and lower extremities, no Babinski sign   STATION and GAIT  Gait slightly ataxic       IMAGING: 1. MRI Brain w w/o contrast 2/24/2022: No evidence of metastatic disease, residual mild T2 hyperintensities in the occipital region bilaterally consistent with PRESS. Significant improvement compared to 10/2020      ASSESSMENT:      This is a 59year old female who presents today for follow-up for seizures. Patient had a breakthrough seizure a recent hospitalization and recently had medications adjusted to Keppra 1 g twice daily and Lamictal 125 twice daily. Since starting she has been seizure-free and denies any side effects from medication.    //Seizure disorder with breakthrough seizures  //Press syndrome  //Bilateral decreased sensation in the lower extremities        PLAN:      1. Continue current AEDs of Keppra 1 g twice daily and Lamictal 125 mg twice daily as patient has been seizure-free without side effects  2. Check Lamictal and Keppra levels  3. Check vitamin B12 and folate, TSH, and HbA1c to assess for numbness of the bilateral lower extremities  4. Follow-up in 3 months      Ms. Garcia received counseling on the following healthy behaviors: medical compliance, smoking cessation, blood pressure control, regular follow up with primary doctor.         Electronically signed by Rosy Guzman MD on 7/11/2022 at 3:38 PM

## 2022-07-15 ENCOUNTER — TELEPHONE (OUTPATIENT)
Dept: ONCOLOGY | Age: 57
End: 2022-07-15

## 2022-07-15 ENCOUNTER — OFFICE VISIT (OUTPATIENT)
Dept: ONCOLOGY | Age: 57
End: 2022-07-15
Payer: COMMERCIAL

## 2022-07-15 VITALS
TEMPERATURE: 97.5 F | BODY MASS INDEX: 25.58 KG/M2 | HEART RATE: 102 BPM | WEIGHT: 153.7 LBS | SYSTOLIC BLOOD PRESSURE: 115 MMHG | DIASTOLIC BLOOD PRESSURE: 76 MMHG

## 2022-07-15 DIAGNOSIS — C79.60 MALIGNANT NEOPLASM METASTATIC TO OVARY, UNSPECIFIED LATERALITY (HCC): Primary | ICD-10-CM

## 2022-07-15 DIAGNOSIS — D64.9 ANEMIA, UNSPECIFIED TYPE: ICD-10-CM

## 2022-07-15 DIAGNOSIS — D50.8 IRON DEFICIENCY ANEMIA SECONDARY TO INADEQUATE DIETARY IRON INTAKE: ICD-10-CM

## 2022-07-15 DIAGNOSIS — I82.512 CHRONIC DEEP VEIN THROMBOSIS (DVT) OF FEMORAL VEIN OF LEFT LOWER EXTREMITY (HCC): ICD-10-CM

## 2022-07-15 PROCEDURE — 3017F COLORECTAL CA SCREEN DOC REV: CPT | Performed by: INTERNAL MEDICINE

## 2022-07-15 PROCEDURE — 4004F PT TOBACCO SCREEN RCVD TLK: CPT | Performed by: INTERNAL MEDICINE

## 2022-07-15 PROCEDURE — G8427 DOCREV CUR MEDS BY ELIG CLIN: HCPCS | Performed by: INTERNAL MEDICINE

## 2022-07-15 PROCEDURE — 99211 OFF/OP EST MAY X REQ PHY/QHP: CPT | Performed by: INTERNAL MEDICINE

## 2022-07-15 PROCEDURE — G8417 CALC BMI ABV UP PARAM F/U: HCPCS | Performed by: INTERNAL MEDICINE

## 2022-07-15 PROCEDURE — 99214 OFFICE O/P EST MOD 30 MIN: CPT | Performed by: INTERNAL MEDICINE

## 2022-07-15 RX ORDER — DICYCLOMINE HCL 20 MG
20 TABLET ORAL 2 TIMES DAILY
COMMUNITY
Start: 2022-07-07 | End: 2022-08-01

## 2022-07-15 NOTE — TELEPHONE ENCOUNTER
Name: Gordon Tarango [place]  : 1965  MRN: [id number]    Oncology Navigation Follow-Up Note    Contact Type:  Medical Oncology  Notes: Pt @ [facility] for Dr. Ramiro Felix f/u. Met with pt prior to f/u. Pt voiced transportation concerns r/t son unable to assist w/transportation. Instructed pt writer will update [facility] , & request contact pt. Pt denied further questions/concerns. Instructed pt may contact writer prn. Attempted to contact Giuseppe, no answer, VM left updated on pt. Will continue to follow.      Electronically signed by Fatoumata Ferreira RN on 7/15/2022 at 1:25 PM

## 2022-07-15 NOTE — TELEPHONE ENCOUNTER
AVS from 7/15/22    Resume chemo next week  Labs before chemo including tumor marker  RV 3-4 weeks      Tx resumed     Pt will have labs drawn one week prior to RV     Rv scheduled for 8/12 @ 10:00 am     Tx to follow    Pt elected to access avs & schedule on [employer]     Electronically signed by Angle Vicente on 7/15/2022 at 2:18 PM

## 2022-07-18 ENCOUNTER — TELEMEDICINE (OUTPATIENT)
Dept: PRIMARY CARE CLINIC | Age: 57
End: 2022-07-18
Payer: COMMERCIAL

## 2022-07-18 DIAGNOSIS — D50.8 OTHER IRON DEFICIENCY ANEMIA: Primary | ICD-10-CM

## 2022-07-18 DIAGNOSIS — C79.51 CANCER, METASTATIC TO BONE (HCC): ICD-10-CM

## 2022-07-18 DIAGNOSIS — R53.83 OTHER FATIGUE: ICD-10-CM

## 2022-07-18 DIAGNOSIS — C56.9 MALIGNANT NEOPLASM OF OVARY, UNSPECIFIED LATERALITY (HCC): ICD-10-CM

## 2022-07-18 DIAGNOSIS — R10.84 GENERALIZED ABDOMINAL PAIN: ICD-10-CM

## 2022-07-18 DIAGNOSIS — I82.512 CHRONIC DEEP VEIN THROMBOSIS (DVT) OF FEMORAL VEIN OF LEFT LOWER EXTREMITY (HCC): ICD-10-CM

## 2022-07-18 PROCEDURE — 99214 OFFICE O/P EST MOD 30 MIN: CPT | Performed by: FAMILY MEDICINE

## 2022-07-18 PROCEDURE — 4004F PT TOBACCO SCREEN RCVD TLK: CPT | Performed by: FAMILY MEDICINE

## 2022-07-18 PROCEDURE — G8417 CALC BMI ABV UP PARAM F/U: HCPCS | Performed by: FAMILY MEDICINE

## 2022-07-18 PROCEDURE — G8427 DOCREV CUR MEDS BY ELIG CLIN: HCPCS | Performed by: FAMILY MEDICINE

## 2022-07-18 PROCEDURE — 3017F COLORECTAL CA SCREEN DOC REV: CPT | Performed by: FAMILY MEDICINE

## 2022-07-18 NOTE — PROGRESS NOTES
Post-Discharge Transitional Care  Follow Up      [facility]   YOB: 1965    Date of Office Visit:  7/18/2022  Date of Hospital Admission: 6/3/22  Date of Hospital Discharge: 6/13/22  Risk of hospital readmission (high >=14%. Medium >=10%) :Readmission Risk Score: 23.4 ( )      Care management risk score Rising risk (score 2-5) and Complex Care (Scores >=6): No Risk Score On File     Non face to face  following discharge, date last encounter closed (first attempt may have been earlier): *No documented post hospital discharge outreach found in the last 14 days    Call initiated 2 business days of discharge: *No response recorded in the last 14 days    ASSESSMENT/PLAN:   Below is the assessment and plan developed based on review of pertinent history, physical exam, labs, studies, and medications. Other iron deficiency anemia  - following with heme-onc, had transfusion when hospitalized. -     DME Order for Valentino Sings as OP  Generalized abdominal pain- has noted some improvement with bentyl and bowel movements improving. Malignant neoplasm of ovary, unspecified laterality ([address]  -     DME Order for Walker as OP  Cancer, metastatic to bone ([address]  -     DME Order for Valentino Sings as OP  Chronic deep vein thrombosis (DVT) of femoral vein of left lower extremity ([address]  -     DME Order for Valentino Sings as OP  Other fatigue  -     DME Order for Valentino Sings as OP    Medical Decision Making: moderate complexity  No follow-ups on file. Subjective:   HPI:  Follow up of Hospital problems/diagnosis(es):     Inpatient course: Discharge summary reviewed- see chart. Interval history/Current status: Pt seen via virtual visit for hospital and rehab follow up. She was hospitalized for worsening abdominal pain/fatigue and weakness. Required transfusion for severe anemia. Seen by GI for abdominal pain chronic sigmoid stricture, and stool softeners and intermittent miralax recommended.  Pt was in rehab as well and recently discharged . Bowel movements have improved and Chris has been helping with her abdominal pain. Denies any fevers or chills. She does need a walker with a seat as she gets very tired and gets short of breath with exertion . She also has chronic back pain as well. Seizures controlled on her current medications.          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)    Acute encephalopathy    PRES (posterior reversible encephalopathy syndrome)    Hemianopia, bitemporal    Encephalopathy acute    Status epilepticus (HCC)    History of ovarian cancer    Pleural effusion    Bandemia    Cancer, metastatic to bone (HCC)    Intractable low back pain    Fatigue    Chronic deep vein thrombosis (DVT) of femoral vein of left lower extremity (HCC)    Iron deficiency anemia secondary to inadequate dietary iron intake    Iron malabsorption    Gynecologic cancer (HCC)    Thrombocytosis    History of deep venous thrombosis (DVT) of distal vein of left lower extremity: On Eliquis    Pelvic mass    Acute on chronic anemia    AMS (altered mental status)    Lactic acidosis    Anxiety    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)    Diabetes mellitus (HCC)    Gastroesophageal reflux disease    Recurrent major depressive disorder, in partial remission (HCC)    Ileus (HCC)    Pericardial effusion    Acute respiratory failure with hypoxia (HCC)    Acute metabolic encephalopathy    Confusion    Urinary tract infection without hematuria    Seizure disorder (HCC)    Breakthrough seizure (HCC)    Leg swelling    Iron deficiency       Medications listed as ordered at the time of discharge from hospital     Medication List            Accurate as of July 18, 2022 11:59 encounter (Telemedicine) with Issa Burnham DO   Medication Sig Dispense Refill    dicyclomine (BENTYL) 20 MG tablet Take 20 mg by mouth in the morning and 20 mg before bedtime. oxyCODONE-acetaminophen (PERCOCET) 5-325 MG per tablet Take 1 tablet by mouth every 4 hours as needed for Pain for up to 30 days. 180 tablet 0    furosemide (LASIX) 20 MG tablet Take 20 mg by mouth daily      loperamide (IMODIUM) 2 MG capsule Take 2 mg by mouth 4 times daily as needed for Diarrhea      potassium chloride (MICRO-K) 10 MEQ extended release capsule Take 10 mEq by mouth daily      [DISCONTINUED] morphine (MS CONTIN) 30 MG extended release tablet Take 30 mg by mouth 2 times daily.       lamoTRIgine (LAMICTAL) 25 MG tablet Take 3 tablets by mouth 2 times daily 300 tablet 3    ondansetron (ZOFRAN-ODT) 4 MG disintegrating tablet Take 1 tablet by mouth every 8 hours as needed for Nausea or Vomiting 60 tablet 2    pantoprazole (PROTONIX) 40 MG tablet TAKE 1 TABLET BY MOUTH DAILY 60 tablet 0    senna (SENOKOT) 8.6 MG tablet Take 1 tablet by mouth 2 times daily 60 tablet 11    melatonin 5 MG TABS tablet Take 1 tablet by mouth daily 30 tablet 3    benzonatate (TESSALON PERLES) 100 MG capsule Take 1 capsule by mouth 3 times daily as needed for Cough 30 capsule 1    apixaban (ELIQUIS) 5 MG TABS tablet Take 1 tablet by mouth 2 times daily 60 tablet 5    levETIRAcetam (KEPPRA) 1000 MG tablet Take 1 tablet by mouth 2 times daily 60 tablet 3    hydrocortisone (ANUSOL-HC) 2.5 % CREA rectal cream Apply on affected area twice daily 1 each 1    ferrous sulfate (IRON 325) 325 (65 Fe) MG tablet Take 1 tablet by mouth daily (with breakfast) 30 tablet 2        Medications patient taking as of now reconciled against medications ordered at time of hospital discharge: Yes    ROS As noted in HPI  Objective:    Patient-Reported Vitals  No data recorded    Virtual exam:  NAD, breathing comfortably, no erythema or redness noted of face    Rafael Delgado Jose, was evaluated through a synchronous (real-time) audio-video encounter. The patient (or guardian if applicable) is aware that this is a billable service, which includes applicable co-pays. This Virtual Visit was conducted with patient's (and/or legal guardian's) consent. The visit was conducted pursuant to the emergency declaration under the [facility] [address] authority and the [employer] General Act. Patient identification was verified, and a caregiver was present when appropriate. The patient was located at Home: [address] [id number]. Provider was located at [place] [address] ([address] [place] [address]): [address] [place] [address]  [address] [place] [address]  Maria Victoria Griffin  [facility].. An electronic signature was used to authenticate this note.   --Samira Ervin, DO

## 2022-07-19 ENCOUNTER — TELEPHONE (OUTPATIENT)
Dept: PRIMARY CARE CLINIC | Age: 57
End: 2022-07-19

## 2022-07-19 ENCOUNTER — TELEPHONE (OUTPATIENT)
Dept: ONCOLOGY | Age: 57
End: 2022-07-19

## 2022-07-19 DIAGNOSIS — C79.51 CANCER, METASTATIC TO BONE (HCC): ICD-10-CM

## 2022-07-19 DIAGNOSIS — C56.9 MALIGNANT NEOPLASM OF OVARY, UNSPECIFIED LATERALITY (HCC): Primary | ICD-10-CM

## 2022-07-19 DIAGNOSIS — I82.512 CHRONIC DEEP VEIN THROMBOSIS (DVT) OF FEMORAL VEIN OF LEFT LOWER EXTREMITY (HCC): ICD-10-CM

## 2022-07-19 DIAGNOSIS — G47.00 INSOMNIA, UNSPECIFIED TYPE: ICD-10-CM

## 2022-07-19 RX ORDER — MORPHINE SULFATE 30 MG/1
TABLET, FILM COATED, EXTENDED RELEASE ORAL
Qty: 60 TABLET | Refills: 0 | Status: SHIPPED | OUTPATIENT
Start: 2022-07-19 | End: 2022-08-17

## 2022-07-19 RX ORDER — MORPHINE SULFATE 15 MG/1
15 TABLET, FILM COATED, EXTENDED RELEASE ORAL 2 TIMES DAILY
Qty: 60 TABLET | Refills: 0 | Status: SHIPPED | OUTPATIENT
Start: 2022-07-19 | End: 2022-08-29

## 2022-07-19 RX ORDER — LORAZEPAM 1 MG/1
1 TABLET ORAL EVERY 8 HOURS PRN
Qty: 90 TABLET | Refills: 0 | Status: SHIPPED | OUTPATIENT
Start: 2022-07-19 | End: 2022-08-22

## 2022-07-19 NOTE — TELEPHONE ENCOUNTER
received referral stating patient was starting treatment back up and was concerned about rides.  spoke with patient and made referral to [facility] for appointment on 7/22.

## 2022-07-20 NOTE — PROGRESS NOTES
_      Chief Complaint   Patient presents with    Follow-up     Review status of disease     DIAGNOSIS:       Recurrent ovarian cancer. Original diagnosis 2005 with multiple recurrences. Diffuse metastasis. CURRENT THERAPY:         Doxil/ Avastin started 9/18/2020. Avastin was discontinued due to recent GI bleeding. Status post recent vascular embolization  S/p seizure disorder. Gemzar started 2/26/2021. Interrupted due to hospitalization and problem with compliance. Evidence of disease progression on CT scan 2/22/22  Add Carboplatin to Gemzar March 2022      BRIEF CASE HISTORY:      Ms. Starla Haynes is a very pleasant 59 y.o. female with history of multiple co morbidities as listed. The patient seen in consultation for ovarian cancer with multiple recurrences. 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 She had surgical debulking and she had systemic treatment with Taxol and carboplatin for 6 cycles. Patient was in remission for about 2 years. She had a relapse in 2007 and treated with topotecan with good results. Patient relapsed again in 2008 and was treated with Taxol carboplatin. After 3 cycles of systemic chemotherapy she had problems with carboplatin so she finished 3 more cycles with Taxol. She did well again for 2 to 3 years until she had a relapse in 2012 and she had intraperitoneal chemotherapy treatment with Taxol. Patient was last seen by her oncologist in 2014 at which time she had relatively stable disease with normal tumor marker and no significant abnormal images. Patient moved to [place] after that and she was lost for oncology follow-ups. Patient was recently evaluated again here in [facility] because of abdominal discomfort. Re imaging confirmed metastatic relapse. Biopsy confirmed ovarian cancer recurrence.  Scan showed extensive unanticipated weight loss or decreased appetite. No fever or chills. Eyes: No blurred vision, eye pain or double vision. Ears: No hearing problems or drainage. No tinnitus. Throat: No sore throat, problems with swallowing or dysphagia. Respiratory: No cough, sputum or hemoptysis. No shortness of breath. No pleuritic chest pain. Cardiovascular: No chest pain, orthopnea or PND. No lower extremity edema. No palpitation. Gastrointestinal: As above. Genitourinary: No dysuria, hematuria, frequency or urgency. Musculoskeletal: No muscle aches or pains. No limitation of movement. No back pain. No gait disturbance, No joint complaints. Dermatologic: No skin rashes or pruritus. No skin lesions or discolorations. Psychiatric: No depression, anxiety, or stress or signs of schizophrenia. No change in mood or affect. Hematologic: No history of bleeding tendency. No bruises or ecchymosis. No history of clotting problems. Infectious disease: No fever, chills or frequent infections. Endocrine: No polydipsia or polyuria. No temperature intolerance. Neurologic: No headaches or dizziness. No weakness or numbness of the extremities. No changes in balance, coordination,  memory, mentation, behavior. Allergic/Immunologic: No nasal congestion or hives. No repeated infections. PHYSICAL EXAM:  The patient is not in acute distress. Vital signs: Blood pressure 115/76, pulse (!) 102, temperature 97.5 °F (36.4 °C), temperature source Temporal, weight 153 lb 11.2 oz (69.7 kg).      General appearance - well appearing, not in pain or distress  Mental status - good mood, alert and oriented  Eyes - pupils equal and reactive, extraocular eye movements intact  Ears - bilateral TM's and external ear canals normal  Nose - normal and patent, no erythema, discharge or polyps  Mouth - mucous membranes moist, pharynx normal without lesions  Neck - supple, no significant adenopathy  Lymphatics - no palpable lymphadenopathy, no hepatosplenomegaly  Chest - clear to auscultation, no wheezes, rales or rhonchi, symmetric air entry  Heart - normal rate, regular rhythm, normal S1, S2, no murmurs, rubs, clicks or gallops  Abdomen - soft, nontender, nondistended, no masses or organomegaly  Neurological - alert, oriented, normal speech, no focal findings or movement disorder noted  Musculoskeletal - no joint tenderness, deformity or swelling  Extremities - peripheral pulses normal, 2+ pedal edema, no clubbing or cyanosis  Skin - normal coloration and turgor, no rashes, no suspicious skin lesions noted     Review of Diagnostic data:   Lab Results   Component Value Date    WBC 7.1 06/24/2022    HGB 9.3 (L) 06/24/2022    HCT 31.1 (L) 06/24/2022    MCV 94.0 06/24/2022     (H) 06/24/2022       Chemistry        Component Value Date/Time     06/24/2022 1120    K 3.7 06/24/2022 1120     06/24/2022 1120    CO2 30 06/24/2022 1120    BUN 6 06/24/2022 1120    CREATININE <0.40 (L) 06/24/2022 1120        Component Value Date/Time    CALCIUM 8.0 (L) 06/24/2022 1120    ALKPHOS 110 (H) 06/24/2022 1120    AST 15 06/24/2022 1120    ALT 9 06/24/2022 1120    BILITOT <0.10 (L) 06/24/2022 1120          Lab Results   Component Value Date     411 (H) 06/24/2022         IMPRESSION:   Recurrent ovarian cancer. Original diagnosis 2005 with multiple recurrences. Diffuse metastasis. Recent active intra-abdominal bleeding due to splenic mass with GI infiltration. Status post embolization  S/p seizure disorder. lymphedema    PLAN:   S/p multiple hospitalization for different problems as above. Currently patient is relatively stable but in rehab with LE edema. Likely lymphedema. Referred to lymphedema clinic. Clinically improving. Discharged from rehab. Discussed further chemo. She is interested in resuming chemo next week. Monitor response and toxicity.    Patient's questions were answered to the best of her satisfaction and

## 2022-07-21 ENCOUNTER — TELEPHONE (OUTPATIENT)
Dept: ONCOLOGY | Age: 57
End: 2022-07-21

## 2022-07-21 NOTE — TELEPHONE ENCOUNTER
Patient to be transported by Nella Nava on 7/22 at 10:15am. Message left with details of transportation.

## 2022-07-22 ENCOUNTER — HOSPITAL ENCOUNTER (OUTPATIENT)
Dept: INFUSION THERAPY | Age: 57
Discharge: HOME | End: 2022-07-22
Payer: COMMERCIAL

## 2022-07-22 VITALS
BODY MASS INDEX: 25.46 KG/M2 | DIASTOLIC BLOOD PRESSURE: 67 MMHG | WEIGHT: 153 LBS | SYSTOLIC BLOOD PRESSURE: 121 MMHG | TEMPERATURE: 98.7 F | RESPIRATION RATE: 18 BRPM | HEART RATE: 93 BPM

## 2022-07-22 DIAGNOSIS — C56.9 MALIGNANT NEOPLASM OF OVARY, UNSPECIFIED LATERALITY (HCC): Primary | ICD-10-CM

## 2022-07-22 DIAGNOSIS — C79.51 CANCER, METASTATIC TO BONE (HCC): ICD-10-CM

## 2022-07-22 DIAGNOSIS — Z85.43 HISTORY OF OVARIAN CANCER: ICD-10-CM

## 2022-07-22 LAB
ABSOLUTE EOS #: 0.34 K/UL (ref 0–0.4)
ABSOLUTE LYMPH #: 1.01 K/UL (ref 1–4.8)
ABSOLUTE MONO #: 0.54 K/UL (ref 0.1–0.8)
ALBUMIN SERPL-MCNC: 4 G/DL (ref 3.5–5.2)
ALBUMIN/GLOBULIN RATIO: 1.5 (ref 1–2.5)
ALP BLD-CCNC: 86 U/L (ref 35–104)
ALT SERPL-CCNC: 8 U/L (ref 5–33)
ANION GAP SERPL CALCULATED.3IONS-SCNC: 10 MMOL/L (ref 9–17)
AST SERPL-CCNC: 12 U/L
BASOPHILS # BLD: 1 % (ref 0–2)
BASOPHILS ABSOLUTE: 0.07 K/UL (ref 0–0.2)
BILIRUB SERPL-MCNC: <0.1 MG/DL (ref 0.3–1.2)
BUN BLDV-MCNC: 11 MG/DL (ref 6–20)
CA 125: 349 U/ML
CALCIUM SERPL-MCNC: 8.6 MG/DL (ref 8.6–10.4)
CHLORIDE BLD-SCNC: 102 MMOL/L (ref 98–107)
CO2: 28 MMOL/L (ref 20–31)
CREAT SERPL-MCNC: 0.61 MG/DL (ref 0.5–0.9)
EOSINOPHILS RELATIVE PERCENT: 5 % (ref 1–4)
GFR AFRICAN AMERICAN: >60 ML/MIN
GFR NON-AFRICAN AMERICAN: >60 ML/MIN
GFR SERPL CREATININE-BSD FRML MDRD: ABNORMAL ML/MIN/{1.73_M2}
GLUCOSE BLD-MCNC: 137 MG/DL (ref 70–99)
HCT VFR BLD CALC: 36 % (ref 36–46)
HEMOGLOBIN: 11.1 G/DL (ref 12–16)
LYMPHOCYTES # BLD: 15 % (ref 24–44)
MCH RBC QN AUTO: 27.8 PG (ref 26–34)
MCHC RBC AUTO-ENTMCNC: 31 G/DL (ref 31–37)
MCV RBC AUTO: 89.9 FL (ref 80–100)
MONOCYTES # BLD: 8 % (ref 1–7)
MORPHOLOGY: ABNORMAL
MORPHOLOGY: ABNORMAL
PDW BLD-RTO: 16.9 % (ref 12.5–15.4)
PLATELET # BLD: 498 K/UL (ref 140–450)
PMV BLD AUTO: 6.3 FL (ref 6–12)
POTASSIUM SERPL-SCNC: 3.6 MMOL/L (ref 3.7–5.3)
RBC # BLD: 4 M/UL (ref 4–5.2)
SEG NEUTROPHILS: 71 % (ref 36–66)
SEGMENTED NEUTROPHILS ABSOLUTE COUNT: 4.74 K/UL (ref 1.8–7.7)
SODIUM BLD-SCNC: 140 MMOL/L (ref 135–144)
TOTAL PROTEIN: 6.6 G/DL (ref 6.4–8.3)
WBC # BLD: 6.7 K/UL (ref 3.5–11)

## 2022-07-22 PROCEDURE — 85025 COMPLETE CBC W/AUTO DIFF WBC: CPT

## 2022-07-22 PROCEDURE — 80053 COMPREHEN METABOLIC PANEL: CPT

## 2022-07-22 PROCEDURE — 2580000003 HC RX 258: Performed by: INTERNAL MEDICINE

## 2022-07-22 PROCEDURE — 96417 CHEMO IV INFUS EACH ADDL SEQ: CPT

## 2022-07-22 PROCEDURE — 86304 IMMUNOASSAY TUMOR CA 125: CPT

## 2022-07-22 PROCEDURE — 96413 CHEMO IV INFUSION 1 HR: CPT

## 2022-07-22 PROCEDURE — 96375 TX/PRO/DX INJ NEW DRUG ADDON: CPT

## 2022-07-22 PROCEDURE — 96367 TX/PROPH/DG ADDL SEQ IV INF: CPT

## 2022-07-22 PROCEDURE — 6360000002 HC RX W HCPCS: Performed by: INTERNAL MEDICINE

## 2022-07-22 PROCEDURE — 36591 DRAW BLOOD OFF VENOUS DEVICE: CPT

## 2022-07-22 RX ORDER — PALONOSETRON 0.05 MG/ML
0.25 INJECTION, SOLUTION INTRAVENOUS ONCE
Status: COMPLETED | OUTPATIENT
Start: 2022-07-22 | End: 2022-07-22

## 2022-07-22 RX ORDER — HEPARIN SODIUM (PORCINE) LOCK FLUSH IV SOLN 100 UNIT/ML 100 UNIT/ML
500 SOLUTION INTRAVENOUS PRN
Status: DISCONTINUED | OUTPATIENT
Start: 2022-07-22 | End: 2022-07-23 | Stop reason: HOSPADM

## 2022-07-22 RX ORDER — DEXAMETHASONE SODIUM PHOSPHATE 10 MG/ML
10 INJECTION INTRAMUSCULAR; INTRAVENOUS ONCE
Status: COMPLETED | OUTPATIENT
Start: 2022-07-22 | End: 2022-07-22

## 2022-07-22 RX ORDER — SODIUM CHLORIDE 0.9 % (FLUSH) 0.9 %
10 SYRINGE (ML) INJECTION PRN
Status: DISCONTINUED | OUTPATIENT
Start: 2022-07-22 | End: 2022-07-23 | Stop reason: HOSPADM

## 2022-07-22 RX ORDER — SODIUM CHLORIDE 9 MG/ML
20 INJECTION, SOLUTION INTRAVENOUS ONCE
Status: COMPLETED | OUTPATIENT
Start: 2022-07-22 | End: 2022-07-22

## 2022-07-22 RX ADMIN — SODIUM CHLORIDE, PRESERVATIVE FREE 10 ML: 5 INJECTION INTRAVENOUS at 14:54

## 2022-07-22 RX ADMIN — FOSAPREPITANT 150 MG: 150 INJECTION, POWDER, LYOPHILIZED, FOR SOLUTION INTRAVENOUS at 12:21

## 2022-07-22 RX ADMIN — PALONOSETRON 0.25 MG: 0.05 INJECTION, SOLUTION INTRAVENOUS at 12:10

## 2022-07-22 RX ADMIN — SODIUM CHLORIDE 20 ML/HR: 9 INJECTION, SOLUTION INTRAVENOUS at 12:09

## 2022-07-22 RX ADMIN — CARBOPLATIN 440 MG: 10 INJECTION INTRAVENOUS at 13:52

## 2022-07-22 RX ADMIN — DEXAMETHASONE SODIUM PHOSPHATE 10 MG: 10 INJECTION INTRAMUSCULAR; INTRAVENOUS at 12:10

## 2022-07-22 RX ADMIN — HEPARIN 500 UNITS: 100 SYRINGE at 14:54

## 2022-07-22 RX ADMIN — SODIUM CHLORIDE, PRESERVATIVE FREE 10 ML: 5 INJECTION INTRAVENOUS at 11:03

## 2022-07-22 RX ADMIN — GEMCITABINE HYDROCHLORIDE 1800 MG: 1 INJECTION, SOLUTION INTRAVENOUS at 12:59

## 2022-07-22 ASSESSMENT — PAIN DESCRIPTION - PAIN TYPE: TYPE: CHRONIC PAIN

## 2022-07-22 ASSESSMENT — PAIN SCALES - GENERAL: PAINLEVEL_OUTOF10: 7

## 2022-07-22 ASSESSMENT — PAIN DESCRIPTION - ORIENTATION: ORIENTATION: LOWER

## 2022-07-22 ASSESSMENT — PAIN DESCRIPTION - LOCATION: LOCATION: BACK

## 2022-07-22 NOTE — PROGRESS NOTES
Pt here for C.18D. 1. [facility]  Arrives via wheelchair. Denies any new complaints. Labs drawn from port, results reviewed. Tx complete without incident. Pt d/c'd in stable condition. Returns 8/12/2022 for office visit and C18D8. Ritu Umana

## 2022-07-26 ENCOUNTER — TELEPHONE (OUTPATIENT)
Dept: ONCOLOGY | Age: 57
End: 2022-07-26

## 2022-07-26 ENCOUNTER — TELEPHONE (OUTPATIENT)
Dept: PRIMARY CARE CLINIC | Age: 57
End: 2022-07-26

## 2022-07-26 NOTE — TELEPHONE ENCOUNTER
Name: Crissy Ramos [facility]  : 1965  MRN: [id number]    Oncology Navigation Follow-Up Note    Contact Type:  Telephone  Notes: Jorge Damon [facility] triage nurse, alerted writer pt left VM requesting writer contact. Spoke with pt, pt inquired on transportation to  OT appt @ 0570. Giuseppe [facility] , updated. Giuseppe stated will coordinate transportation now & pt p/u @ 1330. Pt updated, verbalized understanding & denied questions/concerns. Instructed pt may contact writer prn. Will continue to follow.     Electronically signed by Fern Henry RN on 2022 at 3:14 PM

## 2022-07-26 NOTE — TELEPHONE ENCOUNTER
received referral from Nurse Navigator stating patient was requesting ride assistance to upcoming appointment. Ride scheduled with [employer] for tomorrow at 1:30pm. Patient updated.

## 2022-07-26 NOTE — TELEPHONE ENCOUNTER
Writer received call from Brenda Kumar at [facility] stating they received an order for a walker and they do not participate with patient's insurance. Writer called patient to have her call her insurance to find out where they will participate.  LVM asking patient to return call to office

## 2022-07-27 ENCOUNTER — HOSPITAL ENCOUNTER (OUTPATIENT)
Dept: OCCUPATIONAL THERAPY | Age: 57
Setting detail: THERAPIES SERIES
Discharge: HOME | End: 2022-07-27
Payer: COMMERCIAL

## 2022-07-27 PROCEDURE — 97535 SELF CARE MNGMENT TRAINING: CPT

## 2022-07-27 PROCEDURE — 97166 OT EVAL MOD COMPLEX 45 MIN: CPT

## 2022-07-27 NOTE — CONSULTS
TREATMENT LOCATION:   [x] [address]   [facility] [address]: [phone]  F: [phone] [] [address]: [phone]  F: [phone]      Lymphedema Services - Initial Evaluation for Lower Extremity    Date:  2022  Patient: Randi Douglass  : 1965             MRN: [id number]  Referring Physician: Sofia Dozier MD       Phone: [phone]  Fax: Insurance: Fran Talavera (ID: [id number] ) -  visits  Medical Diagnosis: I89.0, C79.60  Rehab Codes: I89.0   Onset Date: 22  Visit# / total visits:  scheduled; Progress note due at visit 6 per POC. ( Certification Dates: 2022 - 10/27/22)    Allergies:  [] NKA      [] Latex       [] Adhesive tape       [x] Medications    [] Other  Medications: See charted information in Epic    Past Medical History: See charted information in Epic     Restrictions: None  Fall Risk:   [x] No    [] Yes   If yes, intervention:       Overview: Patient is a 59year old female referred to the Lymphedema Clinic with a diagnosis of bilateral Lower Extremity Lymphedema. Hx of ovarian CA with mets and LLE DVT. CA tx with taxol. SUBJECTIVE:  Has had swelling over the last 6 weeks; DVT to LLE in groin in the past; Pt has has swelling in the past to the L leg, but it went away on it's own. Hospitalized in  due to LLE swelling, could barely walk, then to SNF for rehab. Swelling has gone down since then.      Hx of Complete Decongestive Therapy:[]Yes - Date:        [x]No   Rate of onset:   [x] Slow   [] Rapid     [] Acute   Progression: [x] Improving   []  Worsening  [] Consistent   Conservative Treatments Utilized in the Past:  [x] None  [] Compression Garments   [] Bandaging  [] Elevation   [] Exercise  []Self MLD  [] Other/comments:      Current Lymphedmea Treatments:   [] None  [] Compression Garments   [] Bandaging  [x] Elevation  [] Exercise   []Self MLD  [] Other/comments:          Aggravating factors:  [] None   [x] Activity  [] Stress  [] Sleep Position [] Travel [] Hot Temperatures [] Diet   []  Other/comments:    Relieving factors:   [] None [x] Elevation  [] Activity  [] Compression  [] Rest  [] Cold Temperatures [] Diet   []  Other/comments:    Comments: Has not been able to try compression yet. Pain:  [x] YES    [] NO   Location: back     Pain Rating: ( 0-10 scale) : 7/10  Comments:     History of Cancer: []Yes []No   Location/Type:  oviarian CA with mets to spleen and lung mets  Currently undergoing treatments at evaluation: []Yes      []No     []Pending   Surgery:surgical debulking in 2005, and another 11 years ago   Node Dissection: not reported or found in chart review   Chemotherapy: Yes - multiple rounds of taxane based   Radiation: No      Cancer Related Fatigue: moderate   Is dominant extremity affected?  []Yes  []No  [x] N/A        Comorbidities:   [] Obesity [] Dialysis  [] Other:   [] Asthma/COPD [] Dementia [] Other:   [] Stroke [] Sleep apnea [] Other:   [] Vascular disease [] Rheumatic disease [] Other:     Absolute Lymphedema Contraindications - treatement [x] NONE     [] CHF (only if patient is un-medicated or edema is solely d/t cardiac failure)    [] DVT- Acute, No MLD to limb       [] Advanced Renal Disease - Need Physician Clearance   [] Acute Skin Infection ( e.g. Cellulitis, Ersipelas)   [] Thyroid condition (caution with MLD to neck)   [] Cardiac Arrhythmia   [] Hypersensitive Carotid Sinus    Absolute Contraindications Regarding the Deep Abdomen: [x] NONE    [] Pregnancy    [] Abdominal Aortic Aneurism - Current or history of    [] Inflammatory Disease of bowel or intestines   [] Severe Arteriosclerosis    [] Intra-abdominal scar formations following surgery   [] Recent abdominal surgery   [] Pelvic DVT- Current or history of   [] Presence of clot prevention devices ( e.g. - Anthony Filter)     Relative Lymphedema Contraindications [x] NONE      [] Malignant Lymphedema - Edema is being caused by active cancer. MLD and CDT considered palliative during active cancer treatments. Physician approval required. [] Age 61+    [] Limb paralysis     Home/Work Environment  Patient lives with: Himanshu Vick - also with cancer   In what type of home []  One story   [x] Two story - stays on 1st floor   [] Split level  [] Apartment   Number of stairs to enter 4-5   With handrail on the []  Right to enter   [] Left to enter   Bathroom has a []  Tub only  [x] Tub/shower combo   [] Walk in shower    []  Grab bars   Washing machine is on []  Main level   [] Second level   [x] Basement - has assistance   Employer    Job Status []  Normal duty   [] Light duty   [] Off due to condition    []  Retired   [] Not employed   [x] Disability  [] Other:  []  Return to work:    Work activities/duties        ADL/IADL Previous level of function Current level of function Who currently assists the patient with task   Bathing  [x] Independent  [] Assist [] Independent  [x] Assist daughter   Dress/grooming [x] Independent  [] Assist [x] Independent  [] Assist    Transfer/mobility [x] Independent  [] Assist [x] Independent  [] Assist    Feeding [x] Independent  [] Assist [x] Independent  [] Assist    Toileting [x] Independent  [] Assist [x] Independent  [] Assist    Driving [x] Independent  [] Assist [] Independent  [x] Assist Hasn't been driving due to seizures; has transportation   Housekeeping [x] Independent  [] Assist [] Independent  [x] Assist Assist with laundry   Grocery shop/meal prep [x] Independent  [] Assist [] Independent  [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     Gait Prior level of function Current level of function    [x] Independent  [] Assist [x] Independent  [] Assist   Device: [] Independent [x] Independent    [] Straight Cane [] Quad cane [] Straight Cane [] Quad cane    [] Standard walker [] Rolling walker   [] 4 wheeled walker [] Standard walker [] Rolling walker [x] 4 wheeled walker    [] Wheelchair [] Wheelchair         OBJECTIVE  Physical Status:   Range of motion: Deficits [] Yes [x] No       Comment:  Strength:         Deficits [x] Yes [] No        Comment: LLE    Presentation of Affected Areas  Location: bilateral Lower Extremity , L>R   Description: Hyperpigmentation  Pitting 1+/very minimal  Stemmer Sign negative      Scars No   Wounds None   Sensation Numbness, toes   Additional Comments:        Circumferential Measurements  Measurements taken from nail base of D2 digit. Measurements (cm) Right Left   Dorsum   11 20.0 20.5   Inframalleolar      17 30.0 30.4   Supramalleolar    21 18.5 19.7   Lower Calf 27 19.1 22.1   Mid Calf 35 27.7 29.3   Upper Calf 43 31.5 33.6   Knee 55 35.7 37.5   10 cm above knee 65 40.0 42.2   Thigh-widest 75 46.0 51.9   Hip-widest     Initial Total 7/27/2022 :  268.5 287.2            ASSESSMENT: Patient would benefit from skilled occupational therapy services in order to:  Decrease edema that has accumulated in the extremity, decrease risk of infection, improve limb ROM, increase ease and independence with ADL, educate on long term management of condition, and improve overall quality of life. Pt is provided with a folder which included educational hand out on the basics of lymphedema, program details and what to expect during treatment for further review at home. Writer educates on an impaired lymphatic system vs. a healthy lymphatic system and how it relates to swelling and skin integrity given her current presentation/evaluation findings. Pt is edu on a POC that would be of benefit to them given findings made during evaluation, including the items checked below.      Treatment may include the following:     []Bandaging   []Self-Bandaging/Caregiver Training   [x]MLD    [x]Self-MLD   [x] Therapeutic Exercise    [x] Education/Instruction in home management program  [x] Education and trial of a Vasopneumatic Pump    [x] Education and transition to long term management devices such as velcro compression garments and/or daytime compression sleeve/stocking(s)   [x]Discharge to Home Program     Pt is edu on recommendations for edema management including pantyhose style garments versus thigh high garments. At least thigh length needed due to proximal LE swelling. Pt would prefer pantyhose style garments as they are easier to keep up. Checking pt's insurance benefits. Measurements taken to assist with ordering, leg length measurement still needed. Measurements for garments as follows:  RLE  Groin: 55.5 cm  Calf: 31.5 cm  Ankle: 19 cm    LLE  Groin: 59.5 cm  Calf: 33 cm  Ankle: 20 cm    Length: 77 cm    Response to treatment: Pt verbalizes and is agreeable to the instructions/ POC established at today's evaluation. PLAN FOR NEXT VISIT: garment fit and train as able, self-MLD      Lymphedema Stage per ISL Guidelines    [] 0: Subclinical with no evidence on physical exam  [x] 1:  Early onset, swelling/heaviness, pitting edema, subsides with limb elevation  [] 2:  More advanced, fibrosis resulting in non-pitting edema, does not respond to elevation, thickening of the tissues  [] 3: Elephantiasis, pitting absent, huge limbs with dry/scaly, papillomatosis, hyperkeratosis, fluid may be leaking with recurrent cellulitis. Acanthosis (deep body folds). Elevation &   diuretics ineffective. Therapy Goals  STG - To be addressed within 5 visits    Pt will demonstrate compliance of maintaining lymphedema precautions to reduce the risks of infection and further exacerbations. Pt will demonstrate independence with decongestive exercise program in order to expedite fluid rerouting. Pt will demonstrate competence with SELF MLD (Manual lymphatic drainage) in order to reroute lymphatic pathways for decreased swelling.     LTG To be adressed within 10 visits   Pt/Caregiver will demonstrate independence with donning/doffing and wearing schedule for compression garments/ devices to maintain decreased size upon discharge. Pt to compliant with home programming in order to reduce edema in the LLE by 10+ cm. Patient's Goal: reduce swelling     Response to Education Provided:  [x] Verbalized understanding   [] Demonstrates/verbalizes HEP/Education previously given      [x] Needs continued review            [] No understanding   Learner(s): [x] Patient  [] Spouse [] Family  [] Other:   Method(s): [x] Verbal [] Demonstration [x] Handouts [] Exercise booklet   Family available to assist with home program if needed: [] Yes [x] No    FREQUENCY: Pt will be seen 1 x/ week for 10 visits and will follow up as appropriate. Focus is to remain on short and long term goals listed above.      Rehabilitation Potential/Commitment: [x] Good [] Fair     [] Poor    Functional Outcome Measure(s):  Lymphedema Life Impact Scale (LLIS) Score: 50% functionally impaired  Brief Fatigue Inventory: 5 - moderate     Clearance needed:  []Yes    [x]No     Physicians/specialties giving clearance:                    Treatment Charges   Minutes   Units   Evaluation                                                             $97.64/ $76.35            Low   (92137)            Moderate   (67772) 20 1          High   (56189)     Manual Therapy (30471):                                      $26.27 / $20.83     Therapeutic activities (34610):                             $35.63/ $25.68     Therapeutic Exercise (52430)                              $28.50/$ 22.29     Self care/home mgmt (77301)                              $31.61/ $23.84 30 2   Vasopneumatic Device (62059)                           $8.99     Total Treatment Time    50 3         Time In:  1400  Time Out: 1450      Electronically signed by Yuriy Chong OT on 7/27/2022 at 2:39 PM      Physician Signature: _________________________        Date: _______________  By signing above or cosigning this note, I have reviewed this plan of care and certify a need to continue medically necessary rehabilitation services.       *PLEASE SIGN ABOVE AND FAX BACK .[phone] WITH ALL PAGES FOR CONSENT TO CONTINUE LYMPHEDEMA TREATMENT*

## 2022-07-28 ENCOUNTER — TELEPHONE (OUTPATIENT)
Dept: ONCOLOGY | Age: 57
End: 2022-07-28

## 2022-07-28 NOTE — TELEPHONE ENCOUNTER
received call from Je John stating patient called in to cancel today's appointment due to being sick.  cancelled appointment and will touch base with patient to reschedule.  made referral to [facility] for upcoming appointments on 8/8 and 8/12.

## 2022-07-29 ENCOUNTER — TELEPHONE (OUTPATIENT)
Dept: ONCOLOGY | Age: 57
End: 2022-07-29

## 2022-07-29 NOTE — TELEPHONE ENCOUNTER
Patient to be transported by [facility] on 8/8 (1:30pm) and [facility] on 8/12 (9:15am). Message left with patient.

## 2022-08-01 ENCOUNTER — TELEPHONE (OUTPATIENT)
Dept: ONCOLOGY | Age: 57
End: 2022-08-01

## 2022-08-01 ENCOUNTER — TELEPHONE (OUTPATIENT)
Dept: PRIMARY CARE CLINIC | Age: 57
End: 2022-08-01

## 2022-08-01 RX ORDER — POTASSIUM CHLORIDE 750 MG/1
CAPSULE, EXTENDED RELEASE ORAL
Qty: 30 CAPSULE | Refills: 0 | Status: SHIPPED | OUTPATIENT
Start: 2022-08-01 | End: 2022-08-29

## 2022-08-01 RX ORDER — DICYCLOMINE HCL 20 MG
TABLET ORAL
Qty: 60 TABLET | Refills: 0 | Status: SHIPPED | OUTPATIENT
Start: 2022-08-01 | End: 2022-08-29

## 2022-08-01 RX ORDER — SELENIUM 50 MCG
TABLET ORAL
Qty: 60 CAPSULE | Refills: 0 | Status: SHIPPED | OUTPATIENT
Start: 2022-08-01 | End: 2022-08-31

## 2022-08-01 RX ORDER — FUROSEMIDE 20 MG/1
TABLET ORAL
Qty: 30 TABLET | Refills: 0 | Status: SHIPPED | OUTPATIENT
Start: 2022-08-01 | End: 2022-08-29

## 2022-08-02 ENCOUNTER — TELEPHONE (OUTPATIENT)
Dept: ONCOLOGY | Age: 57
End: 2022-08-02

## 2022-08-02 NOTE — TELEPHONE ENCOUNTER
Patient returned 's call. Patient rescheduled for appointment on 8/11 at 2:00pm.  will continue to follow.

## 2022-08-08 ENCOUNTER — HOSPITAL ENCOUNTER (OUTPATIENT)
Dept: OCCUPATIONAL THERAPY | Age: 57
Setting detail: THERAPIES SERIES
Discharge: HOME | End: 2022-08-08
Payer: COMMERCIAL

## 2022-08-08 DIAGNOSIS — C56.9 MALIGNANT NEOPLASM OF OVARY, UNSPECIFIED LATERALITY (HCC): ICD-10-CM

## 2022-08-08 PROCEDURE — 97535 SELF CARE MNGMENT TRAINING: CPT

## 2022-08-08 RX ORDER — OXYCODONE HYDROCHLORIDE AND ACETAMINOPHEN 5; 325 MG/1; MG/1
1 TABLET ORAL EVERY 4 HOURS PRN
Qty: 180 TABLET | Refills: 0 | Status: SHIPPED | OUTPATIENT
Start: 2022-08-08 | End: 2022-09-13 | Stop reason: SDUPTHER

## 2022-08-08 NOTE — FLOWSHEET NOTE
TREATMENT LOCATION:   [x] [address]: [phone]  F: [phone] [] [address]: [phone]  F: [phone]        Lymphedema Services - Treatment Note of the Lower Extremity    Date:  2022  Patient: Julio César Ledesma  : 1965             MRN: [id number]  Referring Physician: Chase Amaya MD       Phone: [phone]  Fax: Insurance: Carlotta Lopes (ID: [id number] ) -  visits  Medical Diagnosis: I89.0, C79.60  Rehab Codes: I89.0     Onset Date: 22  Visit# / total visits: 2/4 scheduled; Progress note due at visit 6 per POC. ( Certification Dates: 2022 - 10/27/22)       Allergies:  [] NKA      [] Latex       [] Adhesive tape       [x] Medications    [] Other    Contraindications/Precautions:   None      Subjective: No new reports since last visit    Pain: [x] YES    [] NO     Location: legs, back      Pain Rating: ( 0-10 scale) : 7/10  Comments:     Plan for next session:  review self-MLD, update pt on garment VOB, vasopneumatic pump trial    Objective:   [] Measurement [] Bandaging [] MLD [] Skin care   [x] Education [] Self-bandaging [x] Self-MLD [] Wound Care   [] Exercise [] Caregiver training [] Kinesiotaping [] Other:    [] Nutrition [] Garment fitting/training [] Vasopneumatic Pump       2022 observations: legs remain down in size, presents similarly to evaluation    Circumferential Measurements   Measurements taken from nail base of D2 digit. Measurements (cm) Right Left   Dorsum   11 20.0 20.5   Inframalleolar      17 30.0 30.4   Supramalleolar    21 18.5 19.7   Lower Calf 27 19.1 22.1   Mid Calf 35 27.7 29.3   Upper Calf 43 31.5 33.6   Knee 55 35.7 37.5   10 cm above knee 65 40.0 42.2   Thigh-widest 75 46.0 51.9   Hip-widest       Initial Total 2022 :  268.5 287.2            Assessment:  [x] Progressing toward goals.      Self-MLD education initiated utilizing handouts below along with visual, verbal, and tactile cueing for proper sequence and hands on technique. Pt demo good  teachback following instruction. Self-MLD Education     What is MLD?: MLD (or Manual Lymph Drainage) engages healthy lymph nodes & vessels in the body to create a suctioning effect to draw fluid away from full/affected areas. Additionally, we are teaching your lymphatic system to reroute fluid to healthy lymph vessels, which then drain into the venous system. It is recommended that you complete your self-MLD 2x per day. You are welcome to complete more if you'd like. Skin on skin contact is best to achieve best stretch. It may be easiest for you to complete self-MLD while you are in bed or reclined. Therefore, you may want to complete in the AM after you wake up and in the PM before you go to bed. Tips:   Self-MLD should be completed SLOWLY. Please do not rush or MLD is not effective. Remember to stretch and let go to allow the skin to recoil. Pressure applied = the amount of pressure you should apply when stroking a s head. Stretches should be large enough to stretch the skin, but NOT slide over the skin. No rubbing or petting the skin. Self-MLD Pre-Treatment for Bilateral Lower Extremity Lymphedema      1. Skin stretches inward just above collar bone x10    2. Deep breathing x5 - belly expands as you breathe in and deflates as you breathe out. Breathe in through the nose, out through the mouth. 3. Skin stretches in toward the body at the right & left armpits x10.    4. Upward skin stretches at the right & left groin x10.    5. Skin stretches from left groin up to left armpit x5, with 3 extra stretches at prison point, on your way up. Repeat x5 on your right side.       Both Lower Extremity Self-MLD Sequence    **Complete self-MLD pre-treatment prior to moving onto the leg(s)**    Upward skin stretches on the outside of the thigh starting just above the knee working your way up to hip -x5    Skin stretches from the inside of your thigh to the outside of your thigh. Start just above the knee and direct stretches to the outside of your thigh. Keep using this method as you work your way up the thigh until you have reached the hip - x5 per section    Upward skin stretches on front, back and both sides of knee- x5 on each side    Upward skin stretches on front, back, and both sides of calf - x5 per side     Upward skin stretches on  both sides of the ankle - x5 each side    Upward skin stretches over the top of the foot - x5    Once you have made it to your foot work your back up the leg from steps 6 to 1. You only need to complete 1-2x instead of 5x. After you get back to the hip, re-work the pathway from groin lymph nodes to armpit lymph nodes from the pre-treatment. Repeat on the opposite leg. [] No change. [] Other:  [x] Patient would continue to benefit from skilled occupational therapy services in order to address goals below                  Therapy Goals:   STG - To be addressed within 5 visits     Pt will demonstrate compliance of maintaining lymphedema precautions to reduce the risks of infection and further exacerbations. Pt will demonstrate independence with decongestive exercise program in order to expedite fluid rerouting. Pt will demonstrate competence with SELF MLD (Manual lymphatic drainage) in order to reroute lymphatic pathways for decreased swelling. Progressing 8/9/22     LTG To be adressed within 10 visits  Pt/Caregiver will demonstrate independence with donning/doffing and wearing schedule for compression garments/ devices to maintain decreased size upon discharge. Pt to compliant with home programming in order to reduce edema in the LLE by 10+ cm. ongoing 8/9/22              Patient's Goal: reduce swelling         Pt.  Education:  [x] Yes  [] No  [x] Reviewed Prior HEP/Ed  Method of Education: [x] Verbal  [x] Demo  [x] Written  Comprehension of Education:  [x] Verbalizes understanding. [x] Demonstrates understanding. [] Needs review. [] No understanding  Knowledge of home program:  [x] Good [] Fair [] Poor [] With assist from family/caregiver        Plan:   [x] Continue current frequency toward long and short term goals.           Treatment Charges   Minutes   Units   Re-evaluation (66137)               $66.82/$50.50                                                             Manual Therapy (78053):                                      $26.27 / $20.83     Therapeutic activities (77358):                             $35.63/ $25.68     Therapeutic Exercise (72845)                              $28.50/$ 22.29     Self care/home mgmt (92337)                              $31.61/ $23.84 35 2   Vasopneumatic Device (24930)                           $8.99     Total Treatment Time    35 2           Time In:  1400  Time Out: 8637      Electronically signed by Angel Otero OT on 8/8/2022 at 2:04 PM

## 2022-08-08 NOTE — TELEPHONE ENCOUNTER
set up transportation for appointment on 8/11.  Patient to be transported by [facility]ene Mc[facility]ullen and [employer] at 1:15pm.

## 2022-08-11 ENCOUNTER — TELEPHONE (OUTPATIENT)
Dept: ONCOLOGY | Age: 57
End: 2022-08-11

## 2022-08-11 NOTE — TELEPHONE ENCOUNTER
Patient called to cancel appointment today due to not feeling well.  cancelled appointment and cancelled ride with [employer] and [facility].  went over transportation for appointment tomorrow with patient.

## 2022-08-12 ENCOUNTER — TELEPHONE (OUTPATIENT)
Dept: ONCOLOGY | Age: 57
End: 2022-08-12

## 2022-08-12 ENCOUNTER — OFFICE VISIT (OUTPATIENT)
Dept: ONCOLOGY | Age: 57
End: 2022-08-12
Payer: COMMERCIAL

## 2022-08-12 ENCOUNTER — HOSPITAL ENCOUNTER (OUTPATIENT)
Dept: INFUSION THERAPY | Age: 57
Discharge: HOME | End: 2022-08-12
Payer: COMMERCIAL

## 2022-08-12 VITALS
BODY MASS INDEX: 25.93 KG/M2 | HEART RATE: 78 BPM | TEMPERATURE: 97 F | SYSTOLIC BLOOD PRESSURE: 117 MMHG | DIASTOLIC BLOOD PRESSURE: 78 MMHG | WEIGHT: 155.8 LBS

## 2022-08-12 DIAGNOSIS — Z85.43 HISTORY OF OVARIAN CANCER: ICD-10-CM

## 2022-08-12 DIAGNOSIS — C79.51 CANCER, METASTATIC TO BONE (HCC): ICD-10-CM

## 2022-08-12 DIAGNOSIS — C56.9 MALIGNANT NEOPLASM OF OVARY, UNSPECIFIED LATERALITY (HCC): Primary | ICD-10-CM

## 2022-08-12 DIAGNOSIS — C79.60 MALIGNANT NEOPLASM METASTATIC TO OVARY, UNSPECIFIED LATERALITY (HCC): ICD-10-CM

## 2022-08-12 LAB
ABSOLUTE EOS #: 0.1 K/UL (ref 0–0.4)
ABSOLUTE LYMPH #: 1.4 K/UL (ref 1–4.8)
ABSOLUTE MONO #: 0.6 K/UL (ref 0.1–1.2)
ALBUMIN SERPL-MCNC: 4.4 G/DL (ref 3.5–5.2)
ALBUMIN/GLOBULIN RATIO: 1.6 (ref 1–2.5)
ALP BLD-CCNC: 103 U/L (ref 35–104)
ALT SERPL-CCNC: 7 U/L (ref 5–33)
ANION GAP SERPL CALCULATED.3IONS-SCNC: 10 MMOL/L (ref 9–17)
AST SERPL-CCNC: 12 U/L
BASOPHILS # BLD: 2 % (ref 0–2)
BASOPHILS ABSOLUTE: 0.1 K/UL (ref 0–0.2)
BILIRUB SERPL-MCNC: 0.13 MG/DL (ref 0.3–1.2)
BUN BLDV-MCNC: 8 MG/DL (ref 6–20)
CA 125: 383 U/ML
CALCIUM SERPL-MCNC: 9 MG/DL (ref 8.6–10.4)
CHLORIDE BLD-SCNC: 102 MMOL/L (ref 98–107)
CO2: 26 MMOL/L (ref 20–31)
CREAT SERPL-MCNC: 0.42 MG/DL (ref 0.5–0.9)
EOSINOPHILS RELATIVE PERCENT: 3 % (ref 1–4)
GFR AFRICAN AMERICAN: >60 ML/MIN
GFR NON-AFRICAN AMERICAN: >60 ML/MIN
GFR SERPL CREATININE-BSD FRML MDRD: ABNORMAL ML/MIN/{1.73_M2}
GLUCOSE BLD-MCNC: 129 MG/DL (ref 70–99)
HCT VFR BLD CALC: 39.3 % (ref 36–46)
HEMOGLOBIN: 12.6 G/DL (ref 12–16)
LYMPHOCYTES # BLD: 31 % (ref 24–44)
MCH RBC QN AUTO: 28.9 PG (ref 26–34)
MCHC RBC AUTO-ENTMCNC: 32 G/DL (ref 31–37)
MCV RBC AUTO: 90.5 FL (ref 80–100)
MONOCYTES # BLD: 13 % (ref 2–11)
PDW BLD-RTO: 18.6 % (ref 12.5–15.4)
PLATELET # BLD: 650 K/UL (ref 140–450)
PMV BLD AUTO: 6.7 FL (ref 6–12)
POTASSIUM SERPL-SCNC: 3.8 MMOL/L (ref 3.7–5.3)
RBC # BLD: 4.34 M/UL (ref 4–5.2)
SEG NEUTROPHILS: 51 % (ref 36–66)
SEGMENTED NEUTROPHILS ABSOLUTE COUNT: 2.4 K/UL (ref 1.8–7.7)
SODIUM BLD-SCNC: 138 MMOL/L (ref 135–144)
TOTAL PROTEIN: 7.1 G/DL (ref 6.4–8.3)
WBC # BLD: 4.6 K/UL (ref 3.5–11)

## 2022-08-12 PROCEDURE — 2580000003 HC RX 258: Performed by: INTERNAL MEDICINE

## 2022-08-12 PROCEDURE — 80053 COMPREHEN METABOLIC PANEL: CPT

## 2022-08-12 PROCEDURE — 36591 DRAW BLOOD OFF VENOUS DEVICE: CPT

## 2022-08-12 PROCEDURE — G8417 CALC BMI ABV UP PARAM F/U: HCPCS | Performed by: INTERNAL MEDICINE

## 2022-08-12 PROCEDURE — 85025 COMPLETE CBC W/AUTO DIFF WBC: CPT

## 2022-08-12 PROCEDURE — 96413 CHEMO IV INFUSION 1 HR: CPT

## 2022-08-12 PROCEDURE — 86304 IMMUNOASSAY TUMOR CA 125: CPT

## 2022-08-12 PROCEDURE — 4004F PT TOBACCO SCREEN RCVD TLK: CPT | Performed by: INTERNAL MEDICINE

## 2022-08-12 PROCEDURE — 3017F COLORECTAL CA SCREEN DOC REV: CPT | Performed by: INTERNAL MEDICINE

## 2022-08-12 PROCEDURE — 6360000002 HC RX W HCPCS: Performed by: INTERNAL MEDICINE

## 2022-08-12 PROCEDURE — 99211 OFF/OP EST MAY X REQ PHY/QHP: CPT | Performed by: INTERNAL MEDICINE

## 2022-08-12 PROCEDURE — 99214 OFFICE O/P EST MOD 30 MIN: CPT | Performed by: INTERNAL MEDICINE

## 2022-08-12 PROCEDURE — 96375 TX/PRO/DX INJ NEW DRUG ADDON: CPT

## 2022-08-12 PROCEDURE — G8427 DOCREV CUR MEDS BY ELIG CLIN: HCPCS | Performed by: INTERNAL MEDICINE

## 2022-08-12 RX ORDER — EPINEPHRINE 1 MG/ML
0.3 INJECTION, SOLUTION, CONCENTRATE INTRAVENOUS PRN
Status: CANCELLED | OUTPATIENT
Start: 2022-09-29

## 2022-08-12 RX ORDER — SODIUM CHLORIDE 9 MG/ML
20 INJECTION, SOLUTION INTRAVENOUS ONCE
Status: CANCELLED | OUTPATIENT
Start: 2022-09-01 | End: 2022-08-26

## 2022-08-12 RX ORDER — SODIUM CHLORIDE 0.9 % (FLUSH) 0.9 %
10 SYRINGE (ML) INJECTION PRN
OUTPATIENT
Start: 2022-11-10

## 2022-08-12 RX ORDER — SODIUM CHLORIDE 0.9 % (FLUSH) 0.9 %
5 SYRINGE (ML) INJECTION PRN
Status: CANCELLED | OUTPATIENT
Start: 2022-09-29

## 2022-08-12 RX ORDER — SODIUM CHLORIDE 9 MG/ML
20 INJECTION, SOLUTION INTRAVENOUS ONCE
Status: CANCELLED | OUTPATIENT
Start: 2022-09-29 | End: 2022-09-02

## 2022-08-12 RX ORDER — SODIUM CHLORIDE 9 MG/ML
INJECTION, SOLUTION INTRAVENOUS CONTINUOUS
Status: CANCELLED | OUTPATIENT
Start: 2022-11-03

## 2022-08-12 RX ORDER — EPINEPHRINE 1 MG/ML
0.3 INJECTION, SOLUTION, CONCENTRATE INTRAVENOUS PRN
Status: CANCELLED | OUTPATIENT
Start: 2022-11-03

## 2022-08-12 RX ORDER — DIPHENHYDRAMINE HYDROCHLORIDE 50 MG/ML
50 INJECTION INTRAMUSCULAR; INTRAVENOUS ONCE
Status: CANCELLED | OUTPATIENT
Start: 2022-11-03 | End: 2022-09-16

## 2022-08-12 RX ORDER — SODIUM CHLORIDE 9 MG/ML
INJECTION, SOLUTION INTRAVENOUS CONTINUOUS
Status: CANCELLED | OUTPATIENT
Start: 2022-09-29

## 2022-08-12 RX ORDER — SODIUM CHLORIDE 0.9 % (FLUSH) 0.9 %
5 SYRINGE (ML) INJECTION PRN
Status: CANCELLED | OUTPATIENT
Start: 2022-11-03

## 2022-08-12 RX ORDER — DIPHENHYDRAMINE HYDROCHLORIDE 50 MG/ML
50 INJECTION INTRAMUSCULAR; INTRAVENOUS ONCE
Status: CANCELLED | OUTPATIENT
Start: 2022-09-01 | End: 2022-08-26

## 2022-08-12 RX ORDER — PALONOSETRON 0.05 MG/ML
0.25 INJECTION, SOLUTION INTRAVENOUS ONCE
Status: CANCELLED | OUTPATIENT
Start: 2022-09-22 | End: 2022-09-16

## 2022-08-12 RX ORDER — METHYLPREDNISOLONE SODIUM SUCCINATE 125 MG/2ML
125 INJECTION, POWDER, LYOPHILIZED, FOR SOLUTION INTRAMUSCULAR; INTRAVENOUS ONCE
Status: CANCELLED | OUTPATIENT
Start: 2022-11-03 | End: 2022-09-16

## 2022-08-12 RX ORDER — SODIUM CHLORIDE 0.9 % (FLUSH) 0.9 %
5 SYRINGE (ML) INJECTION PRN
OUTPATIENT
Start: 2022-11-10

## 2022-08-12 RX ORDER — FAMOTIDINE 10 MG/ML
20 INJECTION, SOLUTION INTRAVENOUS ONCE
Status: CANCELLED | OUTPATIENT
Start: 2022-09-29 | End: 2022-09-02

## 2022-08-12 RX ORDER — HEPARIN SODIUM (PORCINE) LOCK FLUSH IV SOLN 100 UNIT/ML 100 UNIT/ML
500 SOLUTION INTRAVENOUS PRN
Status: CANCELLED | OUTPATIENT
Start: 2022-09-29

## 2022-08-12 RX ORDER — SODIUM CHLORIDE 9 MG/ML
20 INJECTION, SOLUTION INTRAVENOUS ONCE
Status: CANCELLED | OUTPATIENT
Start: 2022-11-03 | End: 2022-09-16

## 2022-08-12 RX ORDER — SODIUM CHLORIDE 9 MG/ML
20 INJECTION, SOLUTION INTRAVENOUS ONCE
OUTPATIENT
Start: 2022-11-10 | End: 2022-09-23

## 2022-08-12 RX ORDER — SODIUM CHLORIDE 9 MG/ML
20 INJECTION, SOLUTION INTRAVENOUS ONCE
Status: DISCONTINUED | OUTPATIENT
Start: 2022-08-12 | End: 2022-08-13 | Stop reason: HOSPADM

## 2022-08-12 RX ORDER — FAMOTIDINE 10 MG/ML
20 INJECTION, SOLUTION INTRAVENOUS ONCE
Status: CANCELLED | OUTPATIENT
Start: 2022-11-03 | End: 2022-09-16

## 2022-08-12 RX ORDER — EPINEPHRINE 1 MG/ML
0.3 INJECTION, SOLUTION, CONCENTRATE INTRAVENOUS PRN
OUTPATIENT
Start: 2022-11-10

## 2022-08-12 RX ORDER — HEPARIN SODIUM (PORCINE) LOCK FLUSH IV SOLN 100 UNIT/ML 100 UNIT/ML
500 SOLUTION INTRAVENOUS PRN
Status: DISCONTINUED | OUTPATIENT
Start: 2022-08-12 | End: 2022-08-13 | Stop reason: HOSPADM

## 2022-08-12 RX ORDER — METHYLPREDNISOLONE SODIUM SUCCINATE 125 MG/2ML
125 INJECTION, POWDER, LYOPHILIZED, FOR SOLUTION INTRAMUSCULAR; INTRAVENOUS ONCE
Status: CANCELLED | OUTPATIENT
Start: 2022-09-01 | End: 2022-08-26

## 2022-08-12 RX ORDER — HEPARIN SODIUM (PORCINE) LOCK FLUSH IV SOLN 100 UNIT/ML 100 UNIT/ML
500 SOLUTION INTRAVENOUS PRN
Status: CANCELLED | OUTPATIENT
Start: 2022-11-03

## 2022-08-12 RX ORDER — METHYLPREDNISOLONE SODIUM SUCCINATE 125 MG/2ML
125 INJECTION, POWDER, LYOPHILIZED, FOR SOLUTION INTRAMUSCULAR; INTRAVENOUS ONCE
OUTPATIENT
Start: 2022-11-10 | End: 2022-09-23

## 2022-08-12 RX ORDER — METHYLPREDNISOLONE SODIUM SUCCINATE 125 MG/2ML
125 INJECTION, POWDER, LYOPHILIZED, FOR SOLUTION INTRAMUSCULAR; INTRAVENOUS ONCE
Status: CANCELLED | OUTPATIENT
Start: 2022-09-29 | End: 2022-09-02

## 2022-08-12 RX ORDER — SODIUM CHLORIDE 0.9 % (FLUSH) 0.9 %
5 SYRINGE (ML) INJECTION PRN
Status: CANCELLED | OUTPATIENT
Start: 2022-09-01

## 2022-08-12 RX ORDER — SODIUM CHLORIDE 0.9 % (FLUSH) 0.9 %
10 SYRINGE (ML) INJECTION PRN
Status: CANCELLED | OUTPATIENT
Start: 2022-09-29

## 2022-08-12 RX ORDER — PALONOSETRON 0.05 MG/ML
0.25 INJECTION, SOLUTION INTRAVENOUS ONCE
Status: CANCELLED | OUTPATIENT
Start: 2022-09-01 | End: 2022-08-26

## 2022-08-12 RX ORDER — SODIUM CHLORIDE 0.9 % (FLUSH) 0.9 %
10 SYRINGE (ML) INJECTION PRN
Status: DISCONTINUED | OUTPATIENT
Start: 2022-08-12 | End: 2022-08-13 | Stop reason: HOSPADM

## 2022-08-12 RX ORDER — HEPARIN SODIUM (PORCINE) LOCK FLUSH IV SOLN 100 UNIT/ML 100 UNIT/ML
500 SOLUTION INTRAVENOUS PRN
Status: CANCELLED | OUTPATIENT
Start: 2022-09-01

## 2022-08-12 RX ORDER — FAMOTIDINE 10 MG/ML
20 INJECTION, SOLUTION INTRAVENOUS ONCE
Status: CANCELLED | OUTPATIENT
Start: 2022-09-01 | End: 2022-08-26

## 2022-08-12 RX ORDER — SODIUM CHLORIDE 9 MG/ML
INJECTION, SOLUTION INTRAVENOUS CONTINUOUS
Status: CANCELLED | OUTPATIENT
Start: 2022-09-01

## 2022-08-12 RX ORDER — SODIUM CHLORIDE 0.9 % (FLUSH) 0.9 %
10 SYRINGE (ML) INJECTION PRN
Status: CANCELLED | OUTPATIENT
Start: 2022-11-03

## 2022-08-12 RX ORDER — HEPARIN SODIUM (PORCINE) LOCK FLUSH IV SOLN 100 UNIT/ML 100 UNIT/ML
500 SOLUTION INTRAVENOUS PRN
OUTPATIENT
Start: 2022-11-10

## 2022-08-12 RX ORDER — EPINEPHRINE 1 MG/ML
0.3 INJECTION, SOLUTION, CONCENTRATE INTRAVENOUS PRN
Status: CANCELLED | OUTPATIENT
Start: 2022-09-01

## 2022-08-12 RX ORDER — DIPHENHYDRAMINE HYDROCHLORIDE 50 MG/ML
50 INJECTION INTRAMUSCULAR; INTRAVENOUS ONCE
OUTPATIENT
Start: 2022-11-10 | End: 2022-09-23

## 2022-08-12 RX ORDER — SODIUM CHLORIDE 9 MG/ML
INJECTION, SOLUTION INTRAVENOUS CONTINUOUS
OUTPATIENT
Start: 2022-11-10

## 2022-08-12 RX ORDER — FAMOTIDINE 10 MG/ML
20 INJECTION, SOLUTION INTRAVENOUS ONCE
OUTPATIENT
Start: 2022-11-10 | End: 2022-09-23

## 2022-08-12 RX ORDER — SODIUM CHLORIDE 0.9 % (FLUSH) 0.9 %
10 SYRINGE (ML) INJECTION PRN
Status: CANCELLED | OUTPATIENT
Start: 2022-09-01

## 2022-08-12 RX ORDER — DIPHENHYDRAMINE HYDROCHLORIDE 50 MG/ML
50 INJECTION INTRAMUSCULAR; INTRAVENOUS ONCE
Status: CANCELLED | OUTPATIENT
Start: 2022-09-29 | End: 2022-09-02

## 2022-08-12 RX ORDER — DEXAMETHASONE SODIUM PHOSPHATE 4 MG/ML
8 INJECTION, SOLUTION INTRA-ARTICULAR; INTRALESIONAL; INTRAMUSCULAR; INTRAVENOUS; SOFT TISSUE ONCE
Status: COMPLETED | OUTPATIENT
Start: 2022-08-12 | End: 2022-08-12

## 2022-08-12 RX ADMIN — HEPARIN 500 UNITS: 100 SYRINGE at 12:18

## 2022-08-12 RX ADMIN — SODIUM CHLORIDE 250 ML/HR: 9 INJECTION, SOLUTION INTRAVENOUS at 11:35

## 2022-08-12 RX ADMIN — SODIUM CHLORIDE, PRESERVATIVE FREE 10 ML: 5 INJECTION INTRAVENOUS at 12:18

## 2022-08-12 RX ADMIN — GEMCITABINE HYDROCHLORIDE 1800 MG: 1 INJECTION, SOLUTION INTRAVENOUS at 11:40

## 2022-08-12 RX ADMIN — DEXAMETHASONE SODIUM PHOSPHATE 8 MG: 4 INJECTION, SOLUTION INTRAMUSCULAR; INTRAVENOUS at 11:36

## 2022-08-12 NOTE — PROGRESS NOTES
Pt here for C18D8 Gemzar. Pt seen by Dr Xiang Douglas prior to tx, refer to his note. Labs drawn from port and results reviewed. Pt was treated without incident and d/c'd in stable condition. Pt is transferring care to [facility].

## 2022-08-12 NOTE — PATIENT INSTRUCTIONS
Continue chemo per schedule  Labs before chemo including tumor marker  RV 3-4 weeks  Please move patient's care to [facility] on Tuesday or Thursday

## 2022-08-14 NOTE — PROGRESS NOTES
_      Chief Complaint   Patient presents with    Follow-up     Review status of disease    Other    Discuss Labs     DIAGNOSIS:       Recurrent ovarian cancer. Original diagnosis 2005 with multiple recurrences. Diffuse metastasis. CURRENT THERAPY:         Doxil/ Avastin started 9/18/2020. Avastin was discontinued due to recent GI bleeding. Status post recent vascular embolization  S/p seizure disorder. Gemzar started 2/26/2021. Interrupted due to hospitalization and problem with compliance. Evidence of disease progression on CT scan 2/22/22  Added Carboplatin to Gemzar March 2022      BRIEF CASE HISTORY:      Ms. Nina Lamas is a very pleasant 59 y.o. female with history of multiple co morbidities as listed. The patient seen in consultation for ovarian cancer with multiple recurrences. 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 She had surgical debulking and she had systemic treatment with Taxol and carboplatin for 6 cycles. Patient was in remission for about 2 years. She had a relapse in 2007 and treated with topotecan with good results. Patient relapsed again in 2008 and was treated with Taxol carboplatin. After 3 cycles of systemic chemotherapy she had problems with carboplatin so she finished 3 more cycles with Taxol. She did well again for 2 to 3 years until she had a relapse in 2012 and she had intraperitoneal chemotherapy treatment with Taxol. Patient was last seen by her oncologist in 2014 at which time she had relatively stable disease with normal tumor marker and no significant abnormal images. Patient moved to [place] after that and she was lost for oncology follow-ups. Patient was recently evaluated again here in [place] because of abdominal discomfort. Re imaging confirmed metastatic relapse.  Biopsy confirmed ovarian cancer recurrence. Scan showed extensive intraabdominal and splenic involvement and lung mets. She has no symptoms at the present time. Patient denies smoking or alcohol drinking.l    INTERIM HISTORY:    patient is seen for follow up recurrent ovarian cancer. Started on Gemzar. Treatment was inturrupted due to sickness and hospitalization. Recently hospitalized again with LE edema. She was in rehab. Was just discharged. No melena or hematochezia. No hematemesis. No recent seizure activities. Labs are stable as well. No severe anemia. No active bleeding. PAST MEDICAL HISTORY: has a past medical history of Anemia, Bleeding, Cervical cancer ([facility] [address] Depression, Diabetes mellitus ([facility] [address] GERD (gastroesophageal reflux disease), Hx of blood clots, Hypertension, Metastatic cancer ([facility] [address] Ovarian cancer ([facility] [address] Post chemo evaluation, and Splenic lesion. PAST SURGICAL HISTORY: has a past surgical history that includes Hysterectomy, total abdominal; Port Surgery; Tonsillectomy; IR PORT PLACEMENT > 5 YEARS (08/24/2020); Anus surgery; Abscess Drainage (2013); colectomy (03/2013); IR EMBOLIZATION HEMORRHAGE (10/05/2020); and Cardiac catheterization. CURRENT MEDICATIONS:  has a current medication list which includes the following prescription(s): probiotic product, oxycodone-acetaminophen, furosemide, potassium chloride, acidophilus, dicyclomine, morphine, lorazepam, morphine, handicap placard, loperamide, lamotrigine, ondansetron, pantoprazole, senna, melatonin, benzonatate, apixaban, levetiracetam, hydrocortisone, ferrous sulfate, and sertraline. ALLERGIES:  is allergic to ceftriaxone. FAMILY HISTORY: Negative for any hematological or oncological conditions. SOCIAL HISTORY:  reports that she has been smoking cigarettes. She has a 20.00 pack-year smoking history. She has never used smokeless tobacco. She reports that she does not currently use alcohol.  She reports that she does not use drugs.    REVIEW OF SYSTEMS:     General: No weakness or fatigue. No unanticipated weight loss or decreased appetite. No fever or chills. Eyes: No blurred vision, eye pain or double vision. Ears: No hearing problems or drainage. No tinnitus. Throat: No sore throat, problems with swallowing or dysphagia. Respiratory: No cough, sputum or hemoptysis. No shortness of breath. No pleuritic chest pain. Cardiovascular: No chest pain, orthopnea or PND. No lower extremity edema. No palpitation. Gastrointestinal: As above. Genitourinary: No dysuria, hematuria, frequency or urgency. Musculoskeletal: No muscle aches or pains. No limitation of movement. No back pain. No gait disturbance, No joint complaints. Dermatologic: No skin rashes or pruritus. No skin lesions or discolorations. Psychiatric: No depression, anxiety, or stress or signs of schizophrenia. No change in mood or affect. Hematologic: No history of bleeding tendency. No bruises or ecchymosis. No history of clotting problems. Infectious disease: No fever, chills or frequent infections. Endocrine: No polydipsia or polyuria. No temperature intolerance. Neurologic: No headaches or dizziness. No weakness or numbness of the extremities. No changes in balance, coordination,  memory, mentation, behavior. Allergic/Immunologic: No nasal congestion or hives. No repeated infections. PHYSICAL EXAM:  The patient is not in acute distress. Vital signs: Blood pressure 117/78, pulse 78, temperature 97 °F (36.1 °C), temperature source Temporal, weight 155 lb 12.8 oz (70.7 kg).      General appearance - well appearing, not in pain or distress  Mental status - good mood, alert and oriented  Eyes - pupils equal and reactive, extraocular eye movements intact  Ears - bilateral TM's and external ear canals normal  Nose - normal and patent, no erythema, discharge or polyps  Mouth - mucous membranes moist, pharynx normal without lesions  Neck - supple, no significant adenopathy  Lymphatics - no palpable lymphadenopathy, no hepatosplenomegaly  Chest - clear to auscultation, no wheezes, rales or rhonchi, symmetric air entry  Heart - normal rate, regular rhythm, normal S1, S2, no murmurs, rubs, clicks or gallops  Abdomen - soft, nontender, nondistended, no masses or organomegaly  Neurological - alert, oriented, normal speech, no focal findings or movement disorder noted  Musculoskeletal - no joint tenderness, deformity or swelling  Extremities - peripheral pulses normal, 2+ pedal edema, no clubbing or cyanosis  Skin - normal coloration and turgor, no rashes, no suspicious skin lesions noted     Review of Diagnostic data:   Lab Results   Component Value Date    WBC 4.6 08/12/2022    HGB 12.6 08/12/2022    HCT 39.3 08/12/2022    MCV 90.5 08/12/2022     (H) 08/12/2022       Chemistry        Component Value Date/Time     08/12/2022 1005    K 3.8 08/12/2022 1005     08/12/2022 1005    CO2 26 08/12/2022 1005    BUN 8 08/12/2022 1005    CREATININE 0.42 (L) 08/12/2022 1005        Component Value Date/Time    CALCIUM 9.0 08/12/2022 1005    ALKPHOS 103 08/12/2022 1005    AST 12 08/12/2022 1005    ALT 7 08/12/2022 1005    BILITOT 0.13 (L) 08/12/2022 1005          Lab Results   Component Value Date     383 (H) 08/12/2022         IMPRESSION:   Recurrent ovarian cancer. Original diagnosis 2005 with multiple recurrences. Diffuse metastasis. Recent active intra-abdominal bleeding due to splenic mass with GI infiltration. Status post embolization  S/p seizure disorder. lymphedema    PLAN:   S/p multiple hospitalization for different problems as above. Currently patient is relatively stable but in rehab with LE edema. Likely lymphedema. Much better. Continues follow up in lymphedema clinic. Chemo tolerated well. Continue treatment. Monitor response and toxicity.    Patient's questions were answered to the best of her satisfaction and she verbalized full understanding and agreement.

## 2022-08-15 ENCOUNTER — HOSPITAL ENCOUNTER (OUTPATIENT)
Dept: OCCUPATIONAL THERAPY | Age: 57
Setting detail: THERAPIES SERIES
Discharge: HOME | End: 2022-08-15
Payer: COMMERCIAL

## 2022-08-15 NOTE — SIGNIFICANT EVENT
[x] [address] Occupational Therapy       [address], [address]st [address]       Phone: [phone]       Fax: [phone] [] [facility]  Therapy at [address] [place] [address].  [place], [place]       Phone: [phone]       Fax: [phone]          Occupational Therapy Cancel/No Show note    Date: 8/15/2022  Patient: Eliazar Jacobsen  : 1965  MRN: [id number]    Cancels/No Shows to date: 1    For today's appointment patient:    [x]  Cancelled    [] Rescheduled appointment    [] No-show     Reason given by patient:    []  Patient ill    []  Conflicting appointment    [] No transportation      [] Conflict with work    [x] No reason given    [] Weather related    [] COVID-19    [] Other:      Comments:       [x] Next appointment was confirmed      Electronically signed by: Curt Hwang OT

## 2022-08-17 DIAGNOSIS — C79.51 CANCER, METASTATIC TO BONE (HCC): ICD-10-CM

## 2022-08-17 DIAGNOSIS — G47.00 INSOMNIA, UNSPECIFIED TYPE: ICD-10-CM

## 2022-08-17 RX ORDER — MORPHINE SULFATE 30 MG/1
TABLET, FILM COATED, EXTENDED RELEASE ORAL
Qty: 60 TABLET | Refills: 0 | Status: SHIPPED | OUTPATIENT
Start: 2022-08-17 | End: 2022-09-21

## 2022-08-22 ENCOUNTER — TELEPHONE (OUTPATIENT)
Dept: ONCOLOGY | Age: 57
End: 2022-08-22

## 2022-08-22 ENCOUNTER — HOSPITAL ENCOUNTER (OUTPATIENT)
Dept: OCCUPATIONAL THERAPY | Age: 57
Setting detail: THERAPIES SERIES
Discharge: HOME | End: 2022-08-22
Payer: COMMERCIAL

## 2022-08-22 DIAGNOSIS — G47.00 INSOMNIA, UNSPECIFIED TYPE: ICD-10-CM

## 2022-08-22 PROCEDURE — 97535 SELF CARE MNGMENT TRAINING: CPT

## 2022-08-22 RX ORDER — LORAZEPAM 1 MG/1
1 TABLET ORAL EVERY 8 HOURS PRN
Qty: 90 TABLET | Refills: 0 | Status: SHIPPED | OUTPATIENT
Start: 2022-08-22 | End: 2022-09-21

## 2022-08-22 NOTE — TELEPHONE ENCOUNTER
Tyler SOLORZANO, RN Navigator. Per Devlin Rebeakh, upon chart review noted that Dr. Garima Limon put in instructions on 8/12/22 office visit to move patient's care to [facility] (Tuesday or Thursday). No check out note was entered, unsure if [facility] was contacted about change. I called to [facility] [place] and spoke with Jeovany Romero transferred me to [place]. [place] states she called Gordo Zimmer at [phone] and that Gordo Zimmer will call patient to get scheduled. Rianna updated.

## 2022-08-22 NOTE — TELEPHONE ENCOUNTER
AVS from 8/12/22    Continue chemo per schedule  Labs before chemo including tumor marker  RV 3-4 weeks  Please move patient's care to [facility] on Tuesday or Thursday      Tx today as scheduled     [facility]ed [facility] for [facility] care    Pt was given AVS and appointment schedule    Electronically signed by Elester Osgood on 8/22/2022 at 3:06 PM

## 2022-08-22 NOTE — FLOWSHEET NOTE
shaving legs  No new tattoos in the affected area. Avoid extreme heat situations such as hot tub or sauna. Compression garments for lymphedema need to fit correctly. An ill-fitting compression garment may make lymphedema worse. Will follow up with patient within 90 days to reschedule for garment measurements. [] No change. [] Other:  [x] Patient would continue to benefit from skilled occupational therapy services in order to address goals below                  Therapy Goals:   STG - To be addressed within 5 visits     Pt will demonstrate compliance of maintaining lymphedema precautions to reduce the risks of infection and further exacerbations. met 8/22/22     Pt will demonstrate independence with decongestive exercise program in order to expedite fluid rerouting. Hold until pt obtains compression garments     Pt will demonstrate competence with SELF MLD (Manual lymphatic drainage) in order to reroute lymphatic pathways for decreased swelling. Progressing 8/22/22     LTG To be adressed within 10 visits  Pt/Caregiver will demonstrate independence with donning/doffing and wearing schedule for compression garments/ devices to maintain decreased size upon discharge. Waiting on garments     Pt to compliant with home programming in order to reduce edema in the LLE by 10+ cm. ongoing 8/22/22              Patient's Goal: reduce swelling         Pt. Education:  [x] Yes  [] No  [x] Reviewed Prior HEP/Ed  Method of Education: [x] Verbal  [x] Demo  [x] Written  Comprehension of Education:  [x] Verbalizes understanding. [x] Demonstrates understanding. [] Needs review. [] No understanding  Knowledge of home program:  [x] Good [] Fair [] Poor [] With assist from family/caregiver        Plan:   [x] Continue current frequency toward long and short term goals.           Treatment Charges   Minutes   Units   Re-evaluation (62958)               $66.82/$50.50 Manual Therapy (81484):                                      $26.27 / $20.83     Therapeutic activities (09049):                             $35.63/ $25.68     Therapeutic Exercise (88267)                              $28.50/$ 22.29     Self care/home mgmt (62343)                              $31.61/ $23.84 3 2   Vasopneumatic Device (30589)                           $8.99     Total Treatment Time    30 2           Time In:  1400  Time Out: 1430      Electronically signed by Kushal Montanez OT on 8/22/2022 at 2:04 PM

## 2022-08-22 NOTE — TELEPHONE ENCOUNTER
Called [facility], spoke with  [facility]) to follow up on request to schedule patient md visit as well as treatment, currently scheduled at  for 9/12.     Writer to follow and will cancel next cycle once it is scheduled at [phone] office    Electronically signed by Rachel Zelaya on 8/22/2022 at 4:32 PM

## 2022-08-22 NOTE — TELEPHONE ENCOUNTER
WRITER CALLED PT TO INFORM HER OF HER TX DATE AND TIME HERE  @ [facility] PT WANTS TO RECEIVE HER TX'S HERE @ [facility].

## 2022-08-23 ENCOUNTER — TELEPHONE (OUTPATIENT)
Dept: ONCOLOGY | Age: 57
End: 2022-08-23

## 2022-08-23 NOTE — TELEPHONE ENCOUNTER
Name: Crissy Ramos [facility]  : 1965  MRN: [id number]    Oncology Navigation Follow-Up Note    Contact Type:  Telephone  Notes: Upon review of chart noted pt scheduled  for Dr. Marcos Rangel f/u & tx @ [facility] per pt's request.  Paige Raya, [facility] , updated on pt's schedule. Will continue to follow.     Electronically signed by Fern Henry RN on 2022 at 9:03 AM

## 2022-08-25 ENCOUNTER — TELEPHONE (OUTPATIENT)
Dept: ONCOLOGY | Age: 57
End: 2022-08-25

## 2022-08-29 DIAGNOSIS — C79.51 CANCER, METASTATIC TO BONE (HCC): ICD-10-CM

## 2022-08-29 RX ORDER — MORPHINE SULFATE 15 MG/1
15 TABLET, FILM COATED, EXTENDED RELEASE ORAL 2 TIMES DAILY
Qty: 60 TABLET | Refills: 0 | Status: SHIPPED | OUTPATIENT
Start: 2022-08-29 | End: 2022-09-28

## 2022-08-29 RX ORDER — POTASSIUM CHLORIDE 750 MG/1
CAPSULE, EXTENDED RELEASE ORAL
Qty: 30 CAPSULE | Refills: 3 | Status: ON HOLD | OUTPATIENT
Start: 2022-08-29 | End: 2022-10-10 | Stop reason: HOSPADM

## 2022-08-29 RX ORDER — DICYCLOMINE HCL 20 MG
TABLET ORAL
Qty: 60 TABLET | Refills: 3 | Status: SHIPPED | OUTPATIENT
Start: 2022-08-29

## 2022-08-29 RX ORDER — FUROSEMIDE 20 MG/1
TABLET ORAL
Qty: 30 TABLET | Refills: 3 | Status: ON HOLD | OUTPATIENT
Start: 2022-08-29 | End: 2022-10-10 | Stop reason: HOSPADM

## 2022-08-30 ENCOUNTER — TELEPHONE (OUTPATIENT)
Dept: ONCOLOGY | Age: 57
End: 2022-08-30

## 2022-08-30 NOTE — TELEPHONE ENCOUNTER
called patient to confirm transportation need for 9/1 appointment.  made referral to [employer].

## 2022-08-31 ENCOUNTER — HOSPITAL ENCOUNTER (OUTPATIENT)
Facility: MEDICAL CENTER | Age: 57
End: 2022-08-31
Payer: COMMERCIAL

## 2022-08-31 RX ORDER — SELENIUM 50 MCG
TABLET ORAL
Qty: 60 CAPSULE | Refills: 5 | Status: SHIPPED | OUTPATIENT
Start: 2022-08-31

## 2022-09-01 ENCOUNTER — OFFICE VISIT (OUTPATIENT)
Dept: ONCOLOGY | Age: 57
End: 2022-09-01
Payer: COMMERCIAL

## 2022-09-01 ENCOUNTER — TELEPHONE (OUTPATIENT)
Dept: ONCOLOGY | Age: 57
End: 2022-09-01

## 2022-09-01 ENCOUNTER — HOSPITAL ENCOUNTER (OUTPATIENT)
Dept: INFUSION THERAPY | Facility: MEDICAL CENTER | Age: 57
Discharge: HOME | End: 2022-09-01
Payer: COMMERCIAL

## 2022-09-01 VITALS
OXYGEN SATURATION: 92 % | DIASTOLIC BLOOD PRESSURE: 82 MMHG | WEIGHT: 151.4 LBS | BODY MASS INDEX: 25.19 KG/M2 | SYSTOLIC BLOOD PRESSURE: 143 MMHG | HEART RATE: 94 BPM

## 2022-09-01 VITALS
TEMPERATURE: 97.9 F | WEIGHT: 151.4 LBS | RESPIRATION RATE: 16 BRPM | BODY MASS INDEX: 25.19 KG/M2 | DIASTOLIC BLOOD PRESSURE: 65 MMHG | HEART RATE: 73 BPM | SYSTOLIC BLOOD PRESSURE: 127 MMHG

## 2022-09-01 DIAGNOSIS — C79.51 CANCER, METASTATIC TO BONE (HCC): ICD-10-CM

## 2022-09-01 DIAGNOSIS — C79.60 MALIGNANT NEOPLASM METASTATIC TO OVARY, UNSPECIFIED LATERALITY (HCC): Primary | ICD-10-CM

## 2022-09-01 DIAGNOSIS — C56.9 MALIGNANT NEOPLASM OF OVARY, UNSPECIFIED LATERALITY (HCC): Primary | ICD-10-CM

## 2022-09-01 DIAGNOSIS — Z85.43 HISTORY OF OVARIAN CANCER: ICD-10-CM

## 2022-09-01 DIAGNOSIS — Z72.0 TOBACCO ABUSE: ICD-10-CM

## 2022-09-01 LAB
ABSOLUTE EOS #: 0.89 K/UL (ref 0–0.44)
ABSOLUTE IMMATURE GRANULOCYTE: 0.02 K/UL (ref 0–0.3)
ABSOLUTE LYMPH #: 1.89 K/UL (ref 1.1–3.7)
ABSOLUTE MONO #: 0.6 K/UL (ref 0.1–1.2)
ALBUMIN SERPL-MCNC: 4.3 G/DL (ref 3.5–5.2)
ALP BLD-CCNC: 102 U/L (ref 35–104)
ALT SERPL-CCNC: 7 U/L (ref 5–33)
ANION GAP SERPL CALCULATED.3IONS-SCNC: 10 MMOL/L (ref 9–17)
AST SERPL-CCNC: 13 U/L
BASOPHILS # BLD: 2 % (ref 0–2)
BASOPHILS ABSOLUTE: 0.1 K/UL (ref 0–0.2)
BILIRUB SERPL-MCNC: 0.1 MG/DL (ref 0.3–1.2)
BUN BLDV-MCNC: 9 MG/DL (ref 6–20)
BUN/CREAT BLD: 16 (ref 9–20)
CA 125: 422 U/ML
CALCIUM SERPL-MCNC: 9.4 MG/DL (ref 8.6–10.4)
CHLORIDE BLD-SCNC: 101 MMOL/L (ref 98–107)
CO2: 29 MMOL/L (ref 20–31)
CREAT SERPL-MCNC: 0.58 MG/DL (ref 0.5–0.9)
EOSINOPHILS RELATIVE PERCENT: 14 % (ref 1–4)
GFR AFRICAN AMERICAN: >60 ML/MIN
GFR NON-AFRICAN AMERICAN: >60 ML/MIN
GFR SERPL CREATININE-BSD FRML MDRD: ABNORMAL ML/MIN/{1.73_M2}
GLUCOSE BLD-MCNC: 123 MG/DL (ref 70–99)
HCT VFR BLD CALC: 46.4 % (ref 36.3–47.1)
HEMOGLOBIN: 14 G/DL (ref 11.9–15.1)
IMMATURE GRANULOCYTES: 0 %
LYMPHOCYTES # BLD: 29 % (ref 24–43)
MCH RBC QN AUTO: 28.1 PG (ref 25.2–33.5)
MCHC RBC AUTO-ENTMCNC: 30.2 G/DL (ref 28.4–34.8)
MCV RBC AUTO: 93.2 FL (ref 82.6–102.9)
MONOCYTES # BLD: 9 % (ref 3–12)
NRBC AUTOMATED: 0 PER 100 WBC
PDW BLD-RTO: 17.9 % (ref 11.8–14.4)
PLATELET # BLD: 536 K/UL (ref 138–453)
PMV BLD AUTO: 9 FL (ref 8.1–13.5)
POTASSIUM SERPL-SCNC: 3.7 MMOL/L (ref 3.7–5.3)
RBC # BLD: 4.98 M/UL (ref 3.95–5.11)
RBC # BLD: ABNORMAL 10*6/UL
SEG NEUTROPHILS: 46 % (ref 36–65)
SEGMENTED NEUTROPHILS ABSOLUTE COUNT: 3.04 K/UL (ref 1.5–8.1)
SODIUM BLD-SCNC: 140 MMOL/L (ref 135–144)
TOTAL PROTEIN: 7.3 G/DL (ref 6.4–8.3)
WBC # BLD: 6.5 K/UL (ref 3.5–11.3)

## 2022-09-01 PROCEDURE — 4004F PT TOBACCO SCREEN RCVD TLK: CPT | Performed by: INTERNAL MEDICINE

## 2022-09-01 PROCEDURE — 99214 OFFICE O/P EST MOD 30 MIN: CPT | Performed by: INTERNAL MEDICINE

## 2022-09-01 PROCEDURE — 3017F COLORECTAL CA SCREEN DOC REV: CPT | Performed by: INTERNAL MEDICINE

## 2022-09-01 PROCEDURE — 96413 CHEMO IV INFUSION 1 HR: CPT

## 2022-09-01 PROCEDURE — 36591 DRAW BLOOD OFF VENOUS DEVICE: CPT

## 2022-09-01 PROCEDURE — 96375 TX/PRO/DX INJ NEW DRUG ADDON: CPT

## 2022-09-01 PROCEDURE — 99211 OFF/OP EST MAY X REQ PHY/QHP: CPT | Performed by: INTERNAL MEDICINE

## 2022-09-01 PROCEDURE — 96365 THER/PROPH/DIAG IV INF INIT: CPT

## 2022-09-01 PROCEDURE — 2580000003 HC RX 258: Performed by: INTERNAL MEDICINE

## 2022-09-01 PROCEDURE — 2500000003 HC RX 250 WO HCPCS: Performed by: INTERNAL MEDICINE

## 2022-09-01 PROCEDURE — G8427 DOCREV CUR MEDS BY ELIG CLIN: HCPCS | Performed by: INTERNAL MEDICINE

## 2022-09-01 PROCEDURE — 96367 TX/PROPH/DG ADDL SEQ IV INF: CPT

## 2022-09-01 PROCEDURE — 96417 CHEMO IV INFUS EACH ADDL SEQ: CPT

## 2022-09-01 PROCEDURE — 86304 IMMUNOASSAY TUMOR CA 125: CPT

## 2022-09-01 PROCEDURE — 80053 COMPREHEN METABOLIC PANEL: CPT

## 2022-09-01 PROCEDURE — G8417 CALC BMI ABV UP PARAM F/U: HCPCS | Performed by: INTERNAL MEDICINE

## 2022-09-01 PROCEDURE — 6360000002 HC RX W HCPCS: Performed by: INTERNAL MEDICINE

## 2022-09-01 PROCEDURE — 85025 COMPLETE CBC W/AUTO DIFF WBC: CPT

## 2022-09-01 RX ORDER — HEPARIN SODIUM (PORCINE) LOCK FLUSH IV SOLN 100 UNIT/ML 100 UNIT/ML
500 SOLUTION INTRAVENOUS PRN
Status: DISCONTINUED | OUTPATIENT
Start: 2022-09-01 | End: 2022-09-02 | Stop reason: HOSPADM

## 2022-09-01 RX ORDER — PANTOPRAZOLE SODIUM 40 MG/1
40 TABLET, DELAYED RELEASE ORAL DAILY
Qty: 30 TABLET | Refills: 3 | Status: SHIPPED | OUTPATIENT
Start: 2022-09-01

## 2022-09-01 RX ORDER — PALONOSETRON 0.05 MG/ML
0.25 INJECTION, SOLUTION INTRAVENOUS ONCE
Status: COMPLETED | OUTPATIENT
Start: 2022-09-01 | End: 2022-09-01

## 2022-09-01 RX ORDER — DIPHENHYDRAMINE HYDROCHLORIDE 50 MG/ML
25 INJECTION INTRAMUSCULAR; INTRAVENOUS ONCE
Status: COMPLETED | OUTPATIENT
Start: 2022-09-01 | End: 2022-09-01

## 2022-09-01 RX ORDER — FAMOTIDINE 10 MG/ML
20 INJECTION, SOLUTION INTRAVENOUS ONCE
Status: COMPLETED | OUTPATIENT
Start: 2022-09-01 | End: 2022-09-01

## 2022-09-01 RX ORDER — DEXAMETHASONE SODIUM PHOSPHATE 10 MG/ML
10 INJECTION INTRAMUSCULAR; INTRAVENOUS ONCE
Status: COMPLETED | OUTPATIENT
Start: 2022-09-01 | End: 2022-09-01

## 2022-09-01 RX ORDER — SODIUM CHLORIDE 9 MG/ML
20 INJECTION, SOLUTION INTRAVENOUS ONCE
Status: COMPLETED | OUTPATIENT
Start: 2022-09-01 | End: 2022-09-01

## 2022-09-01 RX ORDER — SODIUM CHLORIDE 0.9 % (FLUSH) 0.9 %
10 SYRINGE (ML) INJECTION PRN
Status: DISCONTINUED | OUTPATIENT
Start: 2022-09-01 | End: 2022-09-02 | Stop reason: HOSPADM

## 2022-09-01 RX ADMIN — CARBOPLATIN 440 MG: 10 INJECTION INTRAVENOUS at 11:03

## 2022-09-01 RX ADMIN — SODIUM CHLORIDE, PRESERVATIVE FREE 10 ML: 5 INJECTION INTRAVENOUS at 13:53

## 2022-09-01 RX ADMIN — GEMCITABINE HYDROCHLORIDE 1800 MG: 1 INJECTION, SOLUTION INTRAVENOUS at 12:33

## 2022-09-01 RX ADMIN — DEXAMETHASONE SODIUM PHOSPHATE 10 MG: 10 INJECTION INTRAMUSCULAR; INTRAVENOUS at 09:52

## 2022-09-01 RX ADMIN — HEPARIN 500 UNITS: 100 SYRINGE at 13:53

## 2022-09-01 RX ADMIN — PALONOSETRON 0.25 MG: 0.05 INJECTION, SOLUTION INTRAVENOUS at 09:52

## 2022-09-01 RX ADMIN — SODIUM CHLORIDE 20 ML/HR: 9 INJECTION, SOLUTION INTRAVENOUS at 09:12

## 2022-09-01 RX ADMIN — FOSAPREPITANT 150 MG: 150 INJECTION, POWDER, LYOPHILIZED, FOR SOLUTION INTRAVENOUS at 10:09

## 2022-09-01 RX ADMIN — DIPHENHYDRAMINE HYDROCHLORIDE 25 MG: 50 INJECTION, SOLUTION INTRAMUSCULAR; INTRAVENOUS at 12:00

## 2022-09-01 RX ADMIN — FAMOTIDINE 20 MG: 10 INJECTION INTRAVENOUS at 12:00

## 2022-09-01 NOTE — PROGRESS NOTES
Patient arrives via [facility] for cycle 19 day 1 treatment. Patient states this is her first time at [facility] [place] as she previously had treatment at [facility] but recently switched because [facility] is closer to her home. Patient states she has fatigue, occasional nausea, 2 soft stools daily, continued back pain and shortness of breath with exertion. Denies other concerns or complaints. Vitals as charted. Port accessed; specimen sent. Labs reviewed. MD in room, ok to proceed with treatment. Patient premedicated. Carboplatin began at 1103. At [phone], patient calls out and states she feels light headed, hands are itchy and \"feeling fuzzy\". Carboplatin stopped. Vitals obtained. Spo2 90%. 2L NC applied. Normal saline flowing. Pharmacist and MD at bedside, orders for 20 mg IV pepcid and 25 mg IV benadryl and to ok to not infuse remainder of carboplatin today. Per MD ok to continue with gemzar when patient recovers. Medications given (See MAR). Patient reports feeling better. Patient to room air (see flow sheet). Patient monitored for 15 minutes post benadryl and pepcid, per patient ok to continue with gemzar. Gemzar infused with no sign of adverse reaction; line flushed. At 1350, patient back from bathroom. Sp02 obtained, 83% on room air. Patient reports baseline SOB w/ exertion. After a few minutes of rest, patient 92% spo2 on room air. Writer attempted to notify MD, however patient states she can not wait as her ride is on their way. Writer educated patient it is best to wait for MD clearance, however patient still states she cannot stay. Port flushed and heparinized with intact pete needle removed per protocol. Patient discharged with instructions to stop up front for AVS.    MD notified of patients oxygen saturation after exertion. Per MD, ok to re-evaluate next week at patients treatment appointment.   Writer attempted to call patient and notify her we will re-assess next week and educate patient to go to ER with any issues, however patient unable to answer call and voicemail box full.

## 2022-09-01 NOTE — TELEPHONE ENCOUNTER
received call from patient stating she was ready for her return ride.  called [employer] for return ride.  made referral to [facility] for 9/8 appointment.

## 2022-09-02 ENCOUNTER — TELEPHONE (OUTPATIENT)
Dept: ONCOLOGY | Age: 57
End: 2022-09-02

## 2022-09-05 NOTE — PROGRESS NOTES
_      Chief Complaint   Patient presents with    Follow-up    Referral - General    Medication Refill     DIAGNOSIS:       Recurrent ovarian cancer. Original diagnosis 2005 with multiple recurrences. Diffuse metastasis. CURRENT THERAPY:         Doxil/ Avastin started 9/18/2020. Avastin was discontinued due to recent GI bleeding. Status post recent vascular embolization  S/p seizure disorder. Gemzar started 2/26/2021. Interrupted due to hospitalization and problem with compliance. Evidence of disease progression on CT scan 2/22/22  Added Carboplatin to Gemzar March 2022      BRIEF CASE HISTORY:      Ms. Odell Fletcher is a very pleasant 59 y.o. female with history of multiple co morbidities as listed. The patient seen in consultation for ovarian cancer with multiple recurrences. 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 She had surgical debulking and she had systemic treatment with Taxol and carboplatin for 6 cycles. Patient was in remission for about 2 years. She had a relapse in 2007 and treated with topotecan with good results. Patient relapsed again in 2008 and was treated with Taxol carboplatin. After 3 cycles of systemic chemotherapy she had problems with carboplatin so she finished 3 more cycles with Taxol. She did well again for 2 to 3 years until she had a relapse in 2012 and she had intraperitoneal chemotherapy treatment with Taxol. Patient was last seen by her oncologist in 2014 at which time she had relatively stable disease with normal tumor marker and no significant abnormal images. Patient moved to [place] after that and she was lost for oncology follow-ups. Patient was recently evaluated again here in [facility] because of abdominal discomfort. Re imaging confirmed metastatic relapse. Biopsy confirmed ovarian cancer recurrence.  Scan showed extensive intraabdominal and splenic involvement and lung mets. She has no symptoms at the present time. Patient denies smoking or alcohol drinking.l    INTERIM HISTORY:    patient is seen for follow up recurrent ovarian cancer. Started on Gemzar. Treatment was inturrupted due to sickness and hospitalization. Recently hospitalized again with LE edema. She was in rehab. Was just discharged. No melena or hematochezia. No hematemesis. No recent seizure activities. Labs are stable as well. No severe anemia. No active bleeding. PAST MEDICAL HISTORY: has a past medical history of Anemia, Bleeding, Cervical cancer ([facility] [address] Depression, Diabetes mellitus ([facility] [address] GERD (gastroesophageal reflux disease), Hx of blood clots, Hypertension, Metastatic cancer ([facility] [address] Ovarian cancer ([facility] [address] Post chemo evaluation, and Splenic lesion. PAST SURGICAL HISTORY: has a past surgical history that includes Hysterectomy, total abdominal; Port Surgery; Tonsillectomy; IR PORT PLACEMENT > 5 YEARS (08/24/2020); Anus surgery; Abscess Drainage (2013); colectomy (03/2013); IR EMBOLIZATION HEMORRHAGE (10/05/2020); and Cardiac catheterization. CURRENT MEDICATIONS:  has a current medication list which includes the following prescription(s): pantoprazole, acidophilus, furosemide, dicyclomine, morphine, potassium chloride, lorazepam, morphine, oxycodone-acetaminophen, handicap placard, loperamide, lamotrigine, ondansetron, senna, melatonin, benzonatate, apixaban, sertraline, levetiracetam, ferrous sulfate, and hydrocortisone. ALLERGIES:  is allergic to ceftriaxone. FAMILY HISTORY: Negative for any hematological or oncological conditions. SOCIAL HISTORY:  reports that she has been smoking cigarettes. She has a 20.00 pack-year smoking history. She has never used smokeless tobacco. She reports that she does not currently use alcohol. She reports that she does not use drugs.     REVIEW OF SYSTEMS:     General: No weakness or fatigue. No unanticipated weight loss or decreased appetite. No fever or chills. Eyes: No blurred vision, eye pain or double vision. Ears: No hearing problems or drainage. No tinnitus. Throat: No sore throat, problems with swallowing or dysphagia. Respiratory: No cough, sputum or hemoptysis. No shortness of breath. No pleuritic chest pain. Cardiovascular: No chest pain, orthopnea or PND. No lower extremity edema. No palpitation. Gastrointestinal: As above. Genitourinary: No dysuria, hematuria, frequency or urgency. Musculoskeletal: No muscle aches or pains. No limitation of movement. No back pain. No gait disturbance, No joint complaints. Dermatologic: No skin rashes or pruritus. No skin lesions or discolorations. Psychiatric: No depression, anxiety, or stress or signs of schizophrenia. No change in mood or affect. Hematologic: No history of bleeding tendency. No bruises or ecchymosis. No history of clotting problems. Infectious disease: No fever, chills or frequent infections. Endocrine: No polydipsia or polyuria. No temperature intolerance. Neurologic: No headaches or dizziness. No weakness or numbness of the extremities. No changes in balance, coordination,  memory, mentation, behavior. Allergic/Immunologic: No nasal congestion or hives. No repeated infections. PHYSICAL EXAM:  The patient is not in acute distress. Vital signs: Blood pressure 127/65, pulse 73, temperature 97.9 °F (36.6 °C), temperature source Oral, resp. rate 16, weight 151 lb 6.4 oz (68.7 kg).      General appearance - well appearing, not in pain or distress  Mental status - good mood, alert and oriented  Eyes - pupils equal and reactive, extraocular eye movements intact  Ears - bilateral TM's and external ear canals normal  Nose - normal and patent, no erythema, discharge or polyps  Mouth - mucous membranes moist, pharynx normal without lesions  Neck - supple, no significant adenopathy  Lymphatics - no palpable lymphadenopathy, no hepatosplenomegaly  Chest - clear to auscultation, no wheezes, rales or rhonchi, symmetric air entry  Heart - normal rate, regular rhythm, normal S1, S2, no murmurs, rubs, clicks or gallops  Abdomen - soft, nontender, nondistended, no masses or organomegaly  Neurological - alert, oriented, normal speech, no focal findings or movement disorder noted  Musculoskeletal - no joint tenderness, deformity or swelling  Extremities - peripheral pulses normal, 2+ pedal edema, no clubbing or cyanosis  Skin - normal coloration and turgor, no rashes, no suspicious skin lesions noted     Review of Diagnostic data:   Lab Results   Component Value Date    WBC 6.5 09/01/2022    HGB 14.0 09/01/2022    HCT 46.4 09/01/2022    MCV 93.2 09/01/2022     (H) 09/01/2022       Chemistry        Component Value Date/Time     09/01/2022 0910    K 3.7 09/01/2022 0910     09/01/2022 0910    CO2 29 09/01/2022 0910    BUN 9 09/01/2022 0910    CREATININE 0.58 09/01/2022 0910        Component Value Date/Time    CALCIUM 9.4 09/01/2022 0910    ALKPHOS 102 09/01/2022 0910    AST 13 09/01/2022 0910    ALT 7 09/01/2022 0910    BILITOT 0.1 (L) 09/01/2022 0910          Lab Results   Component Value Date     422 (H) 09/01/2022         IMPRESSION:   Recurrent ovarian cancer. Original diagnosis 2005 with multiple recurrences. Diffuse metastasis. Recent active intra-abdominal bleeding due to splenic mass with GI infiltration. Status post embolization  S/p seizure disorder. lymphedema    PLAN:   S/p multiple hospitalization for different problems as above. Currently patient is relatively stable but in rehab with LE edema. Likely lymphedema. Much better. Continues follow up in lymphedema clinic. Chemo tolerated well. Continue treatment. I reviewed labs as stated above and discussed them with the patient. Labs are adequate for chemotherapy treatment.  We will proceed with chemotherapy as planned with no adjustment. Patient was reminded about potential side effects to treatment. We will continue to monitor labs. Monitor response and toxicity. Will repeat scan next month. Patient's questions were answered to the best of her satisfaction and she verbalized full understanding and agreement.

## 2022-09-06 ENCOUNTER — TELEPHONE (OUTPATIENT)
Dept: PHARMACY | Age: 57
End: 2022-09-06

## 2022-09-06 NOTE — TELEPHONE ENCOUNTER
Received new referral for Smoking Cessation from Dr. Barnabas Claude. Called patient and  unable to leave message - mailbox full .

## 2022-09-08 ENCOUNTER — HOSPITAL ENCOUNTER (OUTPATIENT)
Dept: INFUSION THERAPY | Facility: MEDICAL CENTER | Age: 57
Discharge: HOME | End: 2022-09-08

## 2022-09-08 ENCOUNTER — TELEPHONE (OUTPATIENT)
Dept: INFUSION THERAPY | Facility: MEDICAL CENTER | Age: 57
End: 2022-09-08

## 2022-09-08 RX ORDER — FERROUS SULFATE 325(65) MG
TABLET ORAL
Qty: 30 TABLET | Refills: 2 | Status: ON HOLD | OUTPATIENT
Start: 2022-09-08 | End: 2022-10-10 | Stop reason: HOSPADM

## 2022-09-08 NOTE — TELEPHONE ENCOUNTER
Claire Boudreaux (front office staff) notified writer patient cancelled due to not feeling well. Writer notified MD patient cancelled as patient was to be evaluated today after having a potential carboplatin reaction last week.

## 2022-09-09 RX ORDER — LEVETIRACETAM 1000 MG/1
TABLET ORAL
Qty: 60 TABLET | Refills: 3 | Status: ON HOLD | OUTPATIENT
Start: 2022-09-09 | End: 2022-10-14 | Stop reason: HOSPADM

## 2022-09-09 RX ORDER — FERROUS SULFATE 325(65) MG
TABLET ORAL
Qty: 30 TABLET | Refills: 2 | Status: SHIPPED | OUTPATIENT
Start: 2022-09-09

## 2022-09-12 ENCOUNTER — HOSPITAL ENCOUNTER (OUTPATIENT)
Dept: INFUSION THERAPY | Age: 57
End: 2022-09-12

## 2022-09-13 ENCOUNTER — TELEPHONE (OUTPATIENT)
Dept: ONCOLOGY | Age: 57
End: 2022-09-13

## 2022-09-13 DIAGNOSIS — C56.9 MALIGNANT NEOPLASM OF OVARY, UNSPECIFIED LATERALITY (HCC): ICD-10-CM

## 2022-09-13 DIAGNOSIS — G47.00 INSOMNIA, UNSPECIFIED TYPE: ICD-10-CM

## 2022-09-13 DIAGNOSIS — C79.51 CANCER, METASTATIC TO BONE (HCC): ICD-10-CM

## 2022-09-13 RX ORDER — OXYCODONE HYDROCHLORIDE AND ACETAMINOPHEN 5; 325 MG/1; MG/1
1 TABLET ORAL EVERY 4 HOURS PRN
Qty: 180 TABLET | Refills: 0 | Status: SHIPPED | OUTPATIENT
Start: 2022-09-13 | End: 2022-10-13

## 2022-09-13 RX ORDER — MORPHINE SULFATE 30 MG/1
TABLET, FILM COATED, EXTENDED RELEASE ORAL
Qty: 60 TABLET | OUTPATIENT
Start: 2022-09-13 | End: 2022-10-13

## 2022-09-13 RX ORDER — OXYCODONE HYDROCHLORIDE AND ACETAMINOPHEN 5; 325 MG/1; MG/1
1 TABLET ORAL EVERY 4 HOURS PRN
Qty: 180 TABLET | OUTPATIENT
Start: 2022-09-13 | End: 2022-10-13

## 2022-09-14 ENCOUNTER — TELEPHONE (OUTPATIENT)
Dept: ONCOLOGY | Age: 57
End: 2022-09-14

## 2022-09-16 ENCOUNTER — TELEPHONE (OUTPATIENT)
Dept: SPIRITUAL SERVICES | Age: 57
End: 2022-09-16

## 2022-09-16 NOTE — TELEPHONE ENCOUNTER
Writer offered emotional and spiritual support to Patient.     Electronically signed by Ethel Lundborg, Oncology Outpatient [address], [facility]  [phone]  9/16/2022  11:24 AM

## 2022-09-20 DIAGNOSIS — C79.51 CANCER, METASTATIC TO BONE (HCC): ICD-10-CM

## 2022-09-20 DIAGNOSIS — G47.00 INSOMNIA, UNSPECIFIED TYPE: ICD-10-CM

## 2022-09-20 RX ORDER — LAMOTRIGINE 25 MG/1
TABLET ORAL
Qty: 300 TABLET | Refills: 3 | OUTPATIENT
Start: 2022-09-20

## 2022-09-21 ENCOUNTER — HOSPITAL ENCOUNTER (OUTPATIENT)
Facility: MEDICAL CENTER | Age: 57
End: 2022-09-21

## 2022-09-21 RX ORDER — MORPHINE SULFATE 30 MG/1
TABLET, FILM COATED, EXTENDED RELEASE ORAL
Qty: 60 TABLET | Refills: 0 | Status: SHIPPED | OUTPATIENT
Start: 2022-09-21 | End: 2022-10-21

## 2022-09-21 RX ORDER — LORAZEPAM 1 MG/1
1 TABLET ORAL EVERY 8 HOURS PRN
Qty: 90 TABLET | Refills: 0 | Status: SHIPPED | OUTPATIENT
Start: 2022-09-21 | End: 2022-10-21

## 2022-09-21 RX ORDER — SERTRALINE HYDROCHLORIDE 100 MG/1
100 TABLET, FILM COATED ORAL DAILY
Qty: 30 TABLET | Refills: 4 | Status: SHIPPED | OUTPATIENT
Start: 2022-09-21 | End: 2022-10-21

## 2022-09-22 ENCOUNTER — TELEPHONE (OUTPATIENT)
Dept: ONCOLOGY | Age: 57
End: 2022-09-22

## 2022-09-22 ENCOUNTER — HOSPITAL ENCOUNTER (OUTPATIENT)
Dept: INFUSION THERAPY | Facility: MEDICAL CENTER | Age: 57
Discharge: HOME | End: 2022-09-22

## 2022-09-22 RX ORDER — LAMOTRIGINE 25 MG/1
125 TABLET ORAL 2 TIMES DAILY
Qty: 300 TABLET | Refills: 2 | Status: SHIPPED | OUTPATIENT
Start: 2022-09-22

## 2022-09-22 RX ORDER — LAMOTRIGINE 25 MG/1
TABLET ORAL
Qty: 300 TABLET | Refills: 3 | OUTPATIENT
Start: 2022-09-22

## 2022-09-22 NOTE — TELEPHONE ENCOUNTER
Name: Randi Douglass  : 1965  MRN: [id number]    Oncology Navigation Follow-Up Note    Contact Type:  Telephone    Notes: Angie Iyer [facility], alerted writer pt's son recently passed away. Upon review of chart noted pt missed today's Dr. Cally Michael f/u & tx. Spoke with pt for f/u call, support given. Pt stated left VM for [facility]/ triage nurse to notify canceling today's appt & request refill on lamictal.  Upon review of chart noted prescription request documented today. Pt updated & instructed may contact writer prn. Will continue to follow.     Electronically signed by Sharla Bonilla RN on 2022 at 11:44 AM

## 2022-09-22 NOTE — TELEPHONE ENCOUNTER
Pt called and is requesting a Lamictal refill , state that Dr. Amanda William usually fills them . Multiple physician have prescribed this medication , Dr. Amanda William is not one of them . Writer informed pt to have PCP fill this medication , pt states understanding .

## 2022-09-22 NOTE — TELEPHONE ENCOUNTER
Last Visit Date: 7/18/2022   Next Visit Date: 10/13/2022     Patient states that she is taking the Lamictal 25 mg 5 tablets twice a day but the pharmacy had the wrong sig and needs a new script sent.  Please advise

## 2022-09-23 DIAGNOSIS — C79.51 CANCER, METASTATIC TO BONE (HCC): ICD-10-CM

## 2022-09-26 RX ORDER — MORPHINE SULFATE 15 MG/1
15 TABLET, FILM COATED, EXTENDED RELEASE ORAL 2 TIMES DAILY
Qty: 60 TABLET | OUTPATIENT
Start: 2022-09-26 | End: 2022-10-26

## 2022-09-27 ENCOUNTER — TELEPHONE (OUTPATIENT)
Dept: ONCOLOGY | Age: 57
End: 2022-09-27

## 2022-09-27 DIAGNOSIS — C79.51 CANCER, METASTATIC TO BONE (HCC): ICD-10-CM

## 2022-09-28 ENCOUNTER — HOSPITAL ENCOUNTER (OUTPATIENT)
Facility: MEDICAL CENTER | Age: 57
End: 2022-09-28
Payer: COMMERCIAL

## 2022-09-28 RX ORDER — MORPHINE SULFATE 15 MG/1
15 TABLET, FILM COATED, EXTENDED RELEASE ORAL 2 TIMES DAILY
Qty: 60 TABLET | Refills: 0 | Status: SHIPPED | OUTPATIENT
Start: 2022-09-28 | End: 2022-10-28

## 2022-09-29 ENCOUNTER — OFFICE VISIT (OUTPATIENT)
Dept: ONCOLOGY | Age: 57
End: 2022-09-29
Payer: COMMERCIAL

## 2022-09-29 ENCOUNTER — HOSPITAL ENCOUNTER (OUTPATIENT)
Facility: MEDICAL CENTER | Age: 57
End: 2022-09-29
Payer: COMMERCIAL

## 2022-09-29 ENCOUNTER — HOSPITAL ENCOUNTER (OUTPATIENT)
Dept: INFUSION THERAPY | Facility: MEDICAL CENTER | Age: 57
Discharge: HOME | End: 2022-09-29
Payer: COMMERCIAL

## 2022-09-29 ENCOUNTER — TELEPHONE (OUTPATIENT)
Dept: ONCOLOGY | Age: 57
End: 2022-09-29

## 2022-09-29 VITALS
BODY MASS INDEX: 24.46 KG/M2 | HEART RATE: 97 BPM | DIASTOLIC BLOOD PRESSURE: 77 MMHG | TEMPERATURE: 98.1 F | SYSTOLIC BLOOD PRESSURE: 151 MMHG | WEIGHT: 147 LBS | RESPIRATION RATE: 18 BRPM

## 2022-09-29 DIAGNOSIS — C79.60 MALIGNANT NEOPLASM METASTATIC TO OVARY, UNSPECIFIED LATERALITY (HCC): Primary | ICD-10-CM

## 2022-09-29 DIAGNOSIS — C56.9 MALIGNANT NEOPLASM OF OVARY, UNSPECIFIED LATERALITY (HCC): Primary | ICD-10-CM

## 2022-09-29 DIAGNOSIS — C79.51 CANCER, METASTATIC TO BONE (HCC): Primary | ICD-10-CM

## 2022-09-29 DIAGNOSIS — Z85.43 HISTORY OF OVARIAN CANCER: ICD-10-CM

## 2022-09-29 DIAGNOSIS — C56.9 MALIGNANT NEOPLASM OF OVARY, UNSPECIFIED LATERALITY (HCC): ICD-10-CM

## 2022-09-29 DIAGNOSIS — C79.51 CANCER, METASTATIC TO BONE (HCC): ICD-10-CM

## 2022-09-29 LAB
ABSOLUTE EOS #: 0.24 K/UL (ref 0–0.44)
ABSOLUTE IMMATURE GRANULOCYTE: 0.02 K/UL (ref 0–0.3)
ABSOLUTE LYMPH #: 1.76 K/UL (ref 1.1–3.7)
ABSOLUTE MONO #: 0.59 K/UL (ref 0.1–1.2)
ALBUMIN SERPL-MCNC: 4.1 G/DL (ref 3.5–5.2)
ALP BLD-CCNC: 92 U/L (ref 35–104)
ALT SERPL-CCNC: 6 U/L (ref 5–33)
ANION GAP SERPL CALCULATED.3IONS-SCNC: 11 MMOL/L (ref 9–17)
AST SERPL-CCNC: 13 U/L
BASOPHILS # BLD: 1 % (ref 0–2)
BASOPHILS ABSOLUTE: 0.09 K/UL (ref 0–0.2)
BILIRUB SERPL-MCNC: 0.2 MG/DL (ref 0.3–1.2)
BUN BLDV-MCNC: 7 MG/DL (ref 6–20)
BUN/CREAT BLD: 13 (ref 9–20)
CA 125: 430 U/ML
CALCIUM SERPL-MCNC: 9.1 MG/DL (ref 8.6–10.4)
CHLORIDE BLD-SCNC: 103 MMOL/L (ref 98–107)
CO2: 26 MMOL/L (ref 20–31)
CREAT SERPL-MCNC: 0.56 MG/DL (ref 0.5–0.9)
EOSINOPHILS RELATIVE PERCENT: 4 % (ref 1–4)
GFR AFRICAN AMERICAN: >60 ML/MIN
GFR NON-AFRICAN AMERICAN: >60 ML/MIN
GFR SERPL CREATININE-BSD FRML MDRD: ABNORMAL ML/MIN/{1.73_M2}
GLUCOSE BLD-MCNC: 155 MG/DL (ref 70–99)
HCT VFR BLD CALC: 48.1 % (ref 36.3–47.1)
HEMOGLOBIN: 14.7 G/DL (ref 11.9–15.1)
IMMATURE GRANULOCYTES: 0 %
LYMPHOCYTES # BLD: 25 % (ref 24–43)
MCH RBC QN AUTO: 28.8 PG (ref 25.2–33.5)
MCHC RBC AUTO-ENTMCNC: 30.6 G/DL (ref 28.4–34.8)
MCV RBC AUTO: 94.1 FL (ref 82.6–102.9)
MONOCYTES # BLD: 9 % (ref 3–12)
NRBC AUTOMATED: 0 PER 100 WBC
PDW BLD-RTO: 18 % (ref 11.8–14.4)
PLATELET # BLD: 313 K/UL (ref 138–453)
PMV BLD AUTO: 8.8 FL (ref 8.1–13.5)
POTASSIUM SERPL-SCNC: 3.8 MMOL/L (ref 3.7–5.3)
RBC # BLD: 5.11 M/UL (ref 3.95–5.11)
RBC # BLD: ABNORMAL 10*6/UL
SEG NEUTROPHILS: 61 % (ref 36–65)
SEGMENTED NEUTROPHILS ABSOLUTE COUNT: 4.24 K/UL (ref 1.5–8.1)
SODIUM BLD-SCNC: 140 MMOL/L (ref 135–144)
TOTAL PROTEIN: 7.4 G/DL (ref 6.4–8.3)
WBC # BLD: 6.9 K/UL (ref 3.5–11.3)

## 2022-09-29 PROCEDURE — 36591 DRAW BLOOD OFF VENOUS DEVICE: CPT

## 2022-09-29 PROCEDURE — 80053 COMPREHEN METABOLIC PANEL: CPT

## 2022-09-29 PROCEDURE — 96375 TX/PRO/DX INJ NEW DRUG ADDON: CPT

## 2022-09-29 PROCEDURE — 99214 OFFICE O/P EST MOD 30 MIN: CPT | Performed by: INTERNAL MEDICINE

## 2022-09-29 PROCEDURE — 96413 CHEMO IV INFUSION 1 HR: CPT

## 2022-09-29 PROCEDURE — 2580000003 HC RX 258: Performed by: INTERNAL MEDICINE

## 2022-09-29 PROCEDURE — 3017F COLORECTAL CA SCREEN DOC REV: CPT | Performed by: INTERNAL MEDICINE

## 2022-09-29 PROCEDURE — G8427 DOCREV CUR MEDS BY ELIG CLIN: HCPCS | Performed by: INTERNAL MEDICINE

## 2022-09-29 PROCEDURE — 4004F PT TOBACCO SCREEN RCVD TLK: CPT | Performed by: INTERNAL MEDICINE

## 2022-09-29 PROCEDURE — 6360000002 HC RX W HCPCS: Performed by: INTERNAL MEDICINE

## 2022-09-29 PROCEDURE — G8420 CALC BMI NORM PARAMETERS: HCPCS | Performed by: INTERNAL MEDICINE

## 2022-09-29 PROCEDURE — 86304 IMMUNOASSAY TUMOR CA 125: CPT

## 2022-09-29 PROCEDURE — 85025 COMPLETE CBC W/AUTO DIFF WBC: CPT

## 2022-09-29 RX ORDER — SODIUM CHLORIDE 0.9 % (FLUSH) 0.9 %
10 SYRINGE (ML) INJECTION PRN
Status: DISCONTINUED | OUTPATIENT
Start: 2022-09-29 | End: 2022-09-30 | Stop reason: HOSPADM

## 2022-09-29 RX ORDER — HEPARIN SODIUM (PORCINE) LOCK FLUSH IV SOLN 100 UNIT/ML 100 UNIT/ML
500 SOLUTION INTRAVENOUS PRN
Status: DISCONTINUED | OUTPATIENT
Start: 2022-09-29 | End: 2022-09-30 | Stop reason: HOSPADM

## 2022-09-29 RX ORDER — SODIUM CHLORIDE 9 MG/ML
20 INJECTION, SOLUTION INTRAVENOUS ONCE
Status: COMPLETED | OUTPATIENT
Start: 2022-09-29 | End: 2022-09-29

## 2022-09-29 RX ORDER — DEXAMETHASONE SODIUM PHOSPHATE 10 MG/ML
8 INJECTION INTRAMUSCULAR; INTRAVENOUS ONCE
Status: COMPLETED | OUTPATIENT
Start: 2022-09-29 | End: 2022-09-29

## 2022-09-29 RX ADMIN — SODIUM CHLORIDE, PRESERVATIVE FREE 10 ML: 5 INJECTION INTRAVENOUS at 11:42

## 2022-09-29 RX ADMIN — HEPARIN 500 UNITS: 100 SYRINGE at 14:43

## 2022-09-29 RX ADMIN — SODIUM CHLORIDE, PRESERVATIVE FREE 10 ML: 5 INJECTION INTRAVENOUS at 11:41

## 2022-09-29 RX ADMIN — SODIUM CHLORIDE, PRESERVATIVE FREE 10 ML: 5 INJECTION INTRAVENOUS at 11:38

## 2022-09-29 RX ADMIN — SODIUM CHLORIDE 50 ML/HR: 9 INJECTION, SOLUTION INTRAVENOUS at 11:44

## 2022-09-29 RX ADMIN — GEMCITABINE HYDROCHLORIDE 1800 MG: 1 INJECTION, SOLUTION INTRAVENOUS at 13:55

## 2022-09-29 RX ADMIN — SODIUM CHLORIDE, PRESERVATIVE FREE 10 ML: 5 INJECTION INTRAVENOUS at 11:40

## 2022-09-29 RX ADMIN — SODIUM CHLORIDE, PRESERVATIVE FREE 10 ML: 5 INJECTION INTRAVENOUS at 11:39

## 2022-09-29 RX ADMIN — DEXAMETHASONE SODIUM PHOSPHATE 8 MG: 10 INJECTION INTRAMUSCULAR; INTRAVENOUS at 13:13

## 2022-09-29 RX ADMIN — SODIUM CHLORIDE, PRESERVATIVE FREE 10 ML: 5 INJECTION INTRAVENOUS at 14:43

## 2022-09-29 NOTE — PROGRESS NOTES
_      Chief Complaint   Patient presents with    Follow-up     DIAGNOSIS:       Recurrent ovarian cancer. Original diagnosis 2005 with multiple recurrences. Diffuse metastasis. CURRENT THERAPY:         Doxil/ Avastin started 9/18/2020. Avastin was discontinued due to recent GI bleeding. Status post recent vascular embolization  S/p seizure disorder. Gemzar started 2/26/2021. Interrupted due to hospitalization and problem with compliance. Evidence of disease progression on CT scan 2/22/22  Added Carboplatin to Gemzar March 2022      BRIEF CASE HISTORY:      Ms. Miles Gray is a very pleasant 59 y.o. female with history of multiple co morbidities as listed. The patient seen in consultation for ovarian cancer with multiple recurrences. 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 She had surgical debulking and she had systemic treatment with Taxol and carboplatin for 6 cycles. Patient was in remission for about 2 years. She had a relapse in 2007 and treated with topotecan with good results. Patient relapsed again in 2008 and was treated with Taxol carboplatin. After 3 cycles of systemic chemotherapy she had problems with carboplatin so she finished 3 more cycles with Taxol. She did well again for 2 to 3 years until she had a relapse in 2012 and she had intraperitoneal chemotherapy treatment with Taxol. Patient was last seen by her oncologist in 2014 at which time she had relatively stable disease with normal tumor marker and no significant abnormal images. Patient moved to [place] after that and she was lost for oncology follow-ups. Patient was recently evaluated again here in [address] because of abdominal discomfort. Re imaging confirmed metastatic relapse. Biopsy confirmed ovarian cancer recurrence.  Scan showed extensive intraabdominal and splenic involvement and lung mets. She has no symptoms at the present time. Patient denies smoking or alcohol drinking.l    INTERIM HISTORY:    patient is seen for follow up recurrent ovarian cancer. Started on Gemzar. Treatment was inturrupted due to sickness and hospitalization. Recently hospitalized again with LE edema. She was in rehab. Was just discharged. No melena or hematochezia. No hematemesis. No recent seizure activities. Labs are stable as well. No severe anemia. No active bleeding. PAST MEDICAL HISTORY: has a past medical history of Anemia, Bleeding, Cervical cancer ([address] Depression, Diabetes mellitus ([address] GERD (gastroesophageal reflux disease), Hx of blood clots, Hypertension, Metastatic cancer ([facility] [address] Ovarian cancer ([facility] [address] Post chemo evaluation, and Splenic lesion. PAST SURGICAL HISTORY: has a past surgical history that includes Hysterectomy, total abdominal; Port Surgery; Tonsillectomy; IR PORT PLACEMENT > 5 YEARS (08/24/2020); Anus surgery; Abscess Drainage (2013); colectomy (03/2013); IR EMBOLIZATION HEMORRHAGE (10/05/2020); and Cardiac catheterization. CURRENT MEDICATIONS:  has a current medication list which includes the following prescription(s): morphine, lamotrigine, lorazepam, morphine, sertraline, oxycodone-acetaminophen, ferosul, levetiracetam, ferosul, pantoprazole, acidophilus, furosemide, dicyclomine, potassium chloride, handicap placard, loperamide, ondansetron, senna, melatonin, benzonatate, apixaban, and hydrocortisone, and the following Facility-Administered Medications: sodium chloride flush and heparin flush. ALLERGIES:  is allergic to carboplatin and ceftriaxone. FAMILY HISTORY: Negative for any hematological or oncological conditions. SOCIAL HISTORY:  reports that she has been smoking cigarettes. She has a 20.00 pack-year smoking history. She has never used smokeless tobacco. She reports that she does not currently use alcohol.  She reports that she does not use drugs. REVIEW OF SYSTEMS:     General: No weakness or fatigue. No unanticipated weight loss or decreased appetite. No fever or chills. Eyes: No blurred vision, eye pain or double vision. Ears: No hearing problems or drainage. No tinnitus. Throat: No sore throat, problems with swallowing or dysphagia. Respiratory: No cough, sputum or hemoptysis. No shortness of breath. No pleuritic chest pain. Cardiovascular: No chest pain, orthopnea or PND. No lower extremity edema. No palpitation. Gastrointestinal: As above. Genitourinary: No dysuria, hematuria, frequency or urgency. Musculoskeletal: No muscle aches or pains. No limitation of movement. No back pain. No gait disturbance, No joint complaints. Dermatologic: No skin rashes or pruritus. No skin lesions or discolorations. Psychiatric: No depression, anxiety, or stress or signs of schizophrenia. No change in mood or affect. Hematologic: No history of bleeding tendency. No bruises or ecchymosis. No history of clotting problems. Infectious disease: No fever, chills or frequent infections. Endocrine: No polydipsia or polyuria. No temperature intolerance. Neurologic: No headaches or dizziness. No weakness or numbness of the extremities. No changes in balance, coordination,  memory, mentation, behavior. Allergic/Immunologic: No nasal congestion or hives. No repeated infections. PHYSICAL EXAM:  The patient is not in acute distress. Vital signs: Blood pressure (!) 151/77, pulse 97, temperature 98.1 °F (36.7 °C), temperature source Oral, resp. rate 18, weight 147 lb (66.7 kg).      General appearance - well appearing, not in pain or distress  Mental status - good mood, alert and oriented  Eyes - pupils equal and reactive, extraocular eye movements intact  Ears - bilateral TM's and external ear canals normal  Nose - normal and patent, no erythema, discharge or polyps  Mouth - mucous membranes moist, pharynx normal without lesions  Neck - supple, no significant adenopathy  Lymphatics - no palpable lymphadenopathy, no hepatosplenomegaly  Chest - clear to auscultation, no wheezes, rales or rhonchi, symmetric air entry  Heart - normal rate, regular rhythm, normal S1, S2, no murmurs, rubs, clicks or gallops  Abdomen - soft, nontender, nondistended, no masses or organomegaly  Neurological - alert, oriented, normal speech, no focal findings or movement disorder noted  Musculoskeletal - no joint tenderness, deformity or swelling  Extremities - peripheral pulses normal, 2+ pedal edema, no clubbing or cyanosis  Skin - normal coloration and turgor, no rashes, no suspicious skin lesions noted     Review of Diagnostic data:   Lab Results   Component Value Date    WBC 6.9 09/29/2022    HGB 14.7 09/29/2022    HCT 48.1 (H) 09/29/2022    MCV 94.1 09/29/2022     09/29/2022       Chemistry        Component Value Date/Time     09/29/2022 1137    K 3.8 09/29/2022 1137     09/29/2022 1137    CO2 26 09/29/2022 1137    BUN 7 09/29/2022 1137    CREATININE 0.56 09/29/2022 1137        Component Value Date/Time    CALCIUM 9.1 09/29/2022 1137    ALKPHOS 92 09/29/2022 1137    AST 13 09/29/2022 1137    ALT 6 09/29/2022 1137    BILITOT 0.2 (L) 09/29/2022 1137          Lab Results   Component Value Date     430 (H) 09/29/2022         IMPRESSION:   Recurrent ovarian cancer. Original diagnosis 2005 with multiple recurrences. Diffuse metastasis. Recent active intra-abdominal bleeding due to splenic mass with GI infiltration. Status post embolization  S/p seizure disorder. lymphedema    PLAN:   S/p multiple hospitalization for different problems as above. Currently patient is relatively stable but in rehab with LE edema. Likely lymphedema. Much better. Continues follow up in lymphedema clinic. Chemo tolerated well. Continue treatment. I reviewed labs as stated above and discussed them with the patient.  Labs are adequate for chemotherapy treatment. We will proceed with chemotherapy as planned with no adjustment. Patient was reminded about potential side effects to treatment. We will continue to monitor labs. Monitor response and toxicity. Will repeat scan next month. Patient's questions were answered to the best of her satisfaction and she verbalized full understanding and agreement.

## 2022-09-29 NOTE — PROGRESS NOTES
PT ARRIVES PER AMB PER SELF AND STILL CRYING AND UPSET REGARDING RECENT DEATH OF SON. LABS AND ORDERS REVIEWED AND PT SEEN PER MD AND OK TO PROCEED WITH GEMZAR ONLY , DUE TO PAST REACTION TO CARBOPLATIN. NS FLUSHING BEFORE AND AFTER PREMED AND CHEMO AND NO REACTIONS OR COMPLAINTS AND BLOOD RETURN PRESENT THROUGHOUT INFUSION. PT GIVEN AVS FROM  WITH NEXT APPTS AND PT DISCHARGED PER AMB PER SELF.

## 2022-09-29 NOTE — PROGRESS NOTES
SPIRITUAL CARE PROGRESS NOTE: Outpatient Oncology Care at [phone]     Spiritual Assessment: Patient was in the treatment cubicle of the infusion clinic. Patient was crying. She acknowledged writer but did not speak. She continued to cry. She nodded when writer affirmed her courage for coming for treatment. She acknowledged that she \"almost didn't come. \" Pt was tearful. She told writer she did not feel like visiting. She apologized. She was receptive to a lunch and a blanket made by a volunteer. Intervention: Writer provided empathy and care to Pt who lost her son recently. Writer affirmed Pt's courage. Writer assured Pt of her prayers. Writer told Pt she is available for support. Writer gave Pt a lunch tray and a blanket made by a volunteer. Outcome: Patient allowed herself to cry. Patient voiced her needs. She thanked writer. Plan: Chaplains will remain available to provide emotional and spiritual support as needed. 09/29/22 1335   Encounter Summary   Service Provided For: Patient   Referral/Consult From: [address] Family members; Children   Last Encounter  05/06/22   Complexity of Encounter Moderate   Begin Time 1230   End Time  1240   Total Time Calculated 10 min   Encounter    Type Follow up   Spiritual/Emotional needs   Type Emotional Distress   Assessment/Intervention/Outcome   Assessment Sad;Tearful;Impaired resilience; Complicated grieving; Compromised coping   Intervention Sustaining Presence/Ministry of presence;Prayer (assurance of)/[place]   Outcome Refused/Declined;Expressed feelings, needs, and concerns;Expressed Gratitude   Plan and Referrals   Plan/Referrals Continue Support (comment)     Electronically signed by Héctor Valle, Oncology Outpatient [address] [facility]  [phone]  9/29/2022  1:37 PM

## 2022-09-29 NOTE — TELEPHONE ENCOUNTER
Shy Beverly MD VISIT & TX  Continue chemo per schedule  Labs before chemo including tumor marker  RV 3-4 weeks  MD VISIT 10/20/22 @ 10:45AM TX @ 10AM  AVS PRINTED W/ INSTRUCTIONS AND GIVEN TO PT ON EXIT

## 2022-09-30 ENCOUNTER — TELEPHONE (OUTPATIENT)
Dept: ONCOLOGY | Age: 57
End: 2022-09-30

## 2022-10-03 ENCOUNTER — APPOINTMENT (OUTPATIENT)
Dept: CT IMAGING | Age: 57
DRG: 871 | End: 2022-10-03
Payer: COMMERCIAL

## 2022-10-03 ENCOUNTER — HOSPITAL ENCOUNTER (INPATIENT)
Age: 57
LOS: 14 days | Discharge: NURSING, REHAB OR LONG-TERM CARE | DRG: 871 | End: 2022-10-17
Attending: EMERGENCY MEDICINE | Admitting: INTERNAL MEDICINE
Payer: COMMERCIAL

## 2022-10-03 ENCOUNTER — APPOINTMENT (OUTPATIENT)
Dept: GENERAL RADIOLOGY | Age: 57
DRG: 871 | End: 2022-10-03
Payer: COMMERCIAL

## 2022-10-03 DIAGNOSIS — G40.909 SEIZURE DISORDER (HCC): ICD-10-CM

## 2022-10-03 DIAGNOSIS — G40.919 BREAKTHROUGH SEIZURE (HCC): ICD-10-CM

## 2022-10-03 DIAGNOSIS — R41.82 ALTERED MENTAL STATUS, UNSPECIFIED ALTERED MENTAL STATUS TYPE: ICD-10-CM

## 2022-10-03 DIAGNOSIS — A41.9 SEPTICEMIA (HCC): Primary | ICD-10-CM

## 2022-10-03 DIAGNOSIS — G93.41 METABOLIC ENCEPHALOPATHY: ICD-10-CM

## 2022-10-03 DIAGNOSIS — J96.01 ACUTE RESPIRATORY FAILURE WITH HYPOXIA (HCC): ICD-10-CM

## 2022-10-03 DIAGNOSIS — R56.9 SEIZURE (HCC): ICD-10-CM

## 2022-10-03 LAB
ABSOLUTE EOS #: 0 K/UL (ref 0–0.4)
ABSOLUTE IMMATURE GRANULOCYTE: 0 K/UL (ref 0–0.3)
ABSOLUTE LYMPH #: 0.41 K/UL (ref 1–4.8)
ABSOLUTE MONO #: 0 K/UL (ref 0.1–0.8)
ACETAMINOPHEN LEVEL: <5 UG/ML (ref 10–30)
ALBUMIN SERPL-MCNC: 4.3 G/DL (ref 3.5–5.2)
ALBUMIN/GLOBULIN RATIO: 1.6 (ref 1–2.5)
ALLEN TEST: POSITIVE
ALP BLD-CCNC: 98 U/L (ref 35–104)
ALT SERPL-CCNC: 9 U/L (ref 5–33)
AMMONIA: 36 UMOL/L (ref 11–51)
ANION GAP SERPL CALCULATED.3IONS-SCNC: 14 MMOL/L (ref 9–17)
ANION GAP: 9 MMOL/L (ref 7–16)
AST SERPL-CCNC: 18 U/L
BASOPHILS # BLD: 0 % (ref 0–2)
BASOPHILS ABSOLUTE: 0 K/UL (ref 0–0.2)
BILIRUB SERPL-MCNC: 0.3 MG/DL (ref 0.3–1.2)
BILIRUBIN URINE: NEGATIVE
BUN BLDV-MCNC: 8 MG/DL (ref 6–20)
C-REACTIVE PROTEIN: 21.4 MG/L (ref 0–5)
CALCIUM SERPL-MCNC: 9 MG/DL (ref 8.6–10.4)
CASTS UA: ABNORMAL /LPF (ref 0–8)
CHLORIDE BLD-SCNC: 100 MMOL/L (ref 98–107)
CO2: 26 MMOL/L (ref 20–31)
COLOR: YELLOW
CREAT SERPL-MCNC: 0.42 MG/DL (ref 0.5–0.9)
EGFR, POC: >60 ML/MIN/1.73M2
EOSINOPHILS RELATIVE PERCENT: 0 % (ref 1–4)
EPITHELIAL CELLS UA: ABNORMAL /HPF (ref 0–5)
ETHANOL PERCENT: <0.01 %
ETHANOL: <10 MG/DL
FIO2: 40
GFR AFRICAN AMERICAN: >60 ML/MIN
GFR NON-AFRICAN AMERICAN: >60 ML/MIN
GFR NON-AFRICAN AMERICAN: >60 ML/MIN
GFR SERPL CREATININE-BSD FRML MDRD: >60 ML/MIN
GFR SERPL CREATININE-BSD FRML MDRD: ABNORMAL ML/MIN/{1.73_M2}
GFR SERPL CREATININE-BSD FRML MDRD: ABNORMAL ML/MIN/{1.73_M2}
GLUCOSE BLD-MCNC: 155 MG/DL (ref 74–100)
GLUCOSE BLD-MCNC: 158 MG/DL (ref 70–99)
GLUCOSE URINE: ABNORMAL
HCO3 VENOUS: 25.2 MMOL/L (ref 22–29)
HCT VFR BLD CALC: 44.5 % (ref 36.3–47.1)
HEMOGLOBIN: 14.2 G/DL (ref 11.9–15.1)
IMMATURE GRANULOCYTES: 0 %
KEPPRA: 5 UG/ML
KETONES, URINE: ABNORMAL
LACTIC ACID, SEPSIS WHOLE BLOOD: 1.5 MMOL/L (ref 0.5–1.9)
LACTIC ACID, SEPSIS WHOLE BLOOD: 2.5 MMOL/L (ref 0.5–1.9)
LEUKOCYTE ESTERASE, URINE: NEGATIVE
LYMPHOCYTES # BLD: 4 % (ref 24–44)
MAGNESIUM: 1.5 MG/DL (ref 1.6–2.6)
MCH RBC QN AUTO: 29.1 PG (ref 25.2–33.5)
MCHC RBC AUTO-ENTMCNC: 31.9 G/DL (ref 28.4–34.8)
MCV RBC AUTO: 91.2 FL (ref 82.6–102.9)
MONOCYTES # BLD: 0 % (ref 1–7)
MORPHOLOGY: ABNORMAL
NITRITE, URINE: NEGATIVE
NRBC AUTOMATED: 0 PER 100 WBC
O2 DEVICE/FLOW/%: NORMAL
O2 SAT, VEN: 81 % (ref 60–85)
PCO2, VEN: 34.1 MM HG (ref 41–51)
PDW BLD-RTO: 17.1 % (ref 11.8–14.4)
PH UA: 6.5 (ref 5–8)
PH VENOUS: 7.48 (ref 7.32–7.43)
PLATELET # BLD: 202 K/UL (ref 138–453)
PMV BLD AUTO: 9.4 FL (ref 8.1–13.5)
PO2, VEN: 41.8 MM HG (ref 30–50)
POC BUN: 8 MG/DL (ref 8–26)
POC CHLORIDE: 107 MMOL/L (ref 98–107)
POC CREATININE: 0.38 MG/DL (ref 0.51–1.19)
POC HCO3: 24.8 MMOL/L (ref 21–28)
POC HEMATOCRIT: 50 % (ref 36–46)
POC HEMOGLOBIN: 17.1 G/DL (ref 12–16)
POC IONIZED CALCIUM: 0.98 MMOL/L (ref 1.15–1.33)
POC LACTIC ACID: 1.56 MMOL/L (ref 0.56–1.39)
POC O2 SATURATION: 98 % (ref 94–98)
POC PCO2: 38.1 MM HG (ref 35–48)
POC PH: 7.42 (ref 7.35–7.45)
POC PO2: 105.2 MM HG (ref 83–108)
POC POTASSIUM: 4.3 MMOL/L (ref 3.5–4.5)
POC SODIUM: 140 MMOL/L (ref 138–146)
POC TCO2: 25 MMOL/L (ref 22–30)
POSITIVE BASE EXCESS, ART: 0 (ref 0–3)
POSITIVE BASE EXCESS, VEN: 2 (ref 0–3)
POTASSIUM SERPL-SCNC: 4.2 MMOL/L (ref 3.7–5.3)
PROTEIN UA: ABNORMAL
RBC # BLD: 4.88 M/UL (ref 3.95–5.11)
RBC UA: ABNORMAL /HPF (ref 0–4)
SALICYLATE LEVEL: <1 MG/DL (ref 3–10)
SAMPLE SITE: NORMAL
SARS-COV-2, RAPID: NOT DETECTED
SEG NEUTROPHILS: 96 % (ref 36–66)
SEGMENTED NEUTROPHILS ABSOLUTE COUNT: 9.79 K/UL (ref 1.8–7.7)
SODIUM BLD-SCNC: 140 MMOL/L (ref 135–144)
SPECIFIC GRAVITY UA: 1.02 (ref 1–1.03)
SPECIMEN DESCRIPTION: NORMAL
TOTAL PROTEIN: 7 G/DL (ref 6.4–8.3)
TOXIC TRICYCLIC SC,BLOOD: NEGATIVE
TURBIDITY: CLEAR
URINE HGB: ABNORMAL
UROBILINOGEN, URINE: NORMAL
WBC # BLD: 10.2 K/UL (ref 3.5–11.3)
WBC UA: ABNORMAL /HPF (ref 0–5)

## 2022-10-03 PROCEDURE — 80307 DRUG TEST PRSMV CHEM ANLYZR: CPT

## 2022-10-03 PROCEDURE — 99291 CRITICAL CARE FIRST HOUR: CPT | Performed by: INTERNAL MEDICINE

## 2022-10-03 PROCEDURE — 94002 VENT MGMT INPAT INIT DAY: CPT

## 2022-10-03 PROCEDURE — 85025 COMPLETE CBC W/AUTO DIFF WBC: CPT

## 2022-10-03 PROCEDURE — 6360000002 HC RX W HCPCS: Performed by: STUDENT IN AN ORGANIZED HEALTH CARE EDUCATION/TRAINING PROGRAM

## 2022-10-03 PROCEDURE — 6360000002 HC RX W HCPCS

## 2022-10-03 PROCEDURE — 2500000003 HC RX 250 WO HCPCS: Performed by: STUDENT IN AN ORGANIZED HEALTH CARE EDUCATION/TRAINING PROGRAM

## 2022-10-03 PROCEDURE — 6370000000 HC RX 637 (ALT 250 FOR IP): Performed by: STUDENT IN AN ORGANIZED HEALTH CARE EDUCATION/TRAINING PROGRAM

## 2022-10-03 PROCEDURE — 96365 THER/PROPH/DIAG IV INF INIT: CPT

## 2022-10-03 PROCEDURE — 85014 HEMATOCRIT: CPT

## 2022-10-03 PROCEDURE — 6360000004 HC RX CONTRAST MEDICATION: Performed by: STUDENT IN AN ORGANIZED HEALTH CARE EDUCATION/TRAINING PROGRAM

## 2022-10-03 PROCEDURE — 2500000003 HC RX 250 WO HCPCS

## 2022-10-03 PROCEDURE — 80179 DRUG ASSAY SALICYLATE: CPT

## 2022-10-03 PROCEDURE — 2580000003 HC RX 258: Performed by: STUDENT IN AN ORGANIZED HEALTH CARE EDUCATION/TRAINING PROGRAM

## 2022-10-03 PROCEDURE — 36415 COLL VENOUS BLD VENIPUNCTURE: CPT

## 2022-10-03 PROCEDURE — 87641 MR-STAPH DNA AMP PROBE: CPT

## 2022-10-03 PROCEDURE — 83605 ASSAY OF LACTIC ACID: CPT

## 2022-10-03 PROCEDURE — 84520 ASSAY OF UREA NITROGEN: CPT

## 2022-10-03 PROCEDURE — 2000000000 HC ICU R&B

## 2022-10-03 PROCEDURE — 82803 BLOOD GASES ANY COMBINATION: CPT

## 2022-10-03 PROCEDURE — 2500000003 HC RX 250 WO HCPCS: Performed by: INTERNAL MEDICINE

## 2022-10-03 PROCEDURE — 94761 N-INVAS EAR/PLS OXIMETRY MLT: CPT

## 2022-10-03 PROCEDURE — 71260 CT THORAX DX C+: CPT

## 2022-10-03 PROCEDURE — 80177 DRUG SCRN QUAN LEVETIRACETAM: CPT

## 2022-10-03 PROCEDURE — 6360000002 HC RX W HCPCS: Performed by: EMERGENCY MEDICINE

## 2022-10-03 PROCEDURE — 5A1935Z RESPIRATORY VENTILATION, LESS THAN 24 CONSECUTIVE HOURS: ICD-10-PCS | Performed by: INTERNAL MEDICINE

## 2022-10-03 PROCEDURE — 87635 SARS-COV-2 COVID-19 AMP PRB: CPT

## 2022-10-03 PROCEDURE — 70450 CT HEAD/BRAIN W/O DYE: CPT

## 2022-10-03 PROCEDURE — 0BH17EZ INSERTION OF ENDOTRACHEAL AIRWAY INTO TRACHEA, VIA NATURAL OR ARTIFICIAL OPENING: ICD-10-PCS | Performed by: STUDENT IN AN ORGANIZED HEALTH CARE EDUCATION/TRAINING PROGRAM

## 2022-10-03 PROCEDURE — 82565 ASSAY OF CREATININE: CPT

## 2022-10-03 PROCEDURE — 87086 URINE CULTURE/COLONY COUNT: CPT

## 2022-10-03 PROCEDURE — A4216 STERILE WATER/SALINE, 10 ML: HCPCS | Performed by: STUDENT IN AN ORGANIZED HEALTH CARE EDUCATION/TRAINING PROGRAM

## 2022-10-03 PROCEDURE — 80143 DRUG ASSAY ACETAMINOPHEN: CPT

## 2022-10-03 PROCEDURE — 83735 ASSAY OF MAGNESIUM: CPT

## 2022-10-03 PROCEDURE — 82330 ASSAY OF CALCIUM: CPT

## 2022-10-03 PROCEDURE — 87040 BLOOD CULTURE FOR BACTERIA: CPT

## 2022-10-03 PROCEDURE — 36600 WITHDRAWAL OF ARTERIAL BLOOD: CPT

## 2022-10-03 PROCEDURE — 96375 TX/PRO/DX INJ NEW DRUG ADDON: CPT

## 2022-10-03 PROCEDURE — 86140 C-REACTIVE PROTEIN: CPT

## 2022-10-03 PROCEDURE — 96368 THER/DIAG CONCURRENT INF: CPT

## 2022-10-03 PROCEDURE — 96366 THER/PROPH/DIAG IV INF ADDON: CPT

## 2022-10-03 PROCEDURE — 70496 CT ANGIOGRAPHY HEAD: CPT

## 2022-10-03 PROCEDURE — 99285 EMERGENCY DEPT VISIT HI MDM: CPT

## 2022-10-03 PROCEDURE — 82140 ASSAY OF AMMONIA: CPT

## 2022-10-03 PROCEDURE — 82947 ASSAY GLUCOSE BLOOD QUANT: CPT

## 2022-10-03 PROCEDURE — 80051 ELECTROLYTE PANEL: CPT

## 2022-10-03 PROCEDURE — 81001 URINALYSIS AUTO W/SCOPE: CPT

## 2022-10-03 PROCEDURE — 93005 ELECTROCARDIOGRAM TRACING: CPT | Performed by: STUDENT IN AN ORGANIZED HEALTH CARE EDUCATION/TRAINING PROGRAM

## 2022-10-03 PROCEDURE — G0480 DRUG TEST DEF 1-7 CLASSES: HCPCS

## 2022-10-03 PROCEDURE — 80053 COMPREHEN METABOLIC PANEL: CPT

## 2022-10-03 PROCEDURE — 2700000000 HC OXYGEN THERAPY PER DAY

## 2022-10-03 PROCEDURE — 71045 X-RAY EXAM CHEST 1 VIEW: CPT

## 2022-10-03 PROCEDURE — 96376 TX/PRO/DX INJ SAME DRUG ADON: CPT

## 2022-10-03 RX ORDER — VECURONIUM BROMIDE 1 MG/ML
10 INJECTION, POWDER, LYOPHILIZED, FOR SOLUTION INTRAVENOUS ONCE
Status: COMPLETED | OUTPATIENT
Start: 2022-10-03 | End: 2022-10-03

## 2022-10-03 RX ORDER — PROPOFOL 10 MG/ML
INJECTION, EMULSION INTRAVENOUS
Status: DISPENSED
Start: 2022-10-03 | End: 2022-10-04

## 2022-10-03 RX ORDER — ACETAMINOPHEN 325 MG/1
650 TABLET ORAL EVERY 6 HOURS PRN
Status: DISCONTINUED | OUTPATIENT
Start: 2022-10-03 | End: 2022-10-17 | Stop reason: HOSPADM

## 2022-10-03 RX ORDER — ENOXAPARIN SODIUM 100 MG/ML
40 INJECTION SUBCUTANEOUS DAILY
Status: DISCONTINUED | OUTPATIENT
Start: 2022-10-03 | End: 2022-10-04

## 2022-10-03 RX ORDER — SODIUM CHLORIDE 9 MG/ML
INJECTION, SOLUTION INTRAVENOUS CONTINUOUS
Status: DISCONTINUED | OUTPATIENT
Start: 2022-10-03 | End: 2022-10-07

## 2022-10-03 RX ORDER — SODIUM CHLORIDE 9 MG/ML
INJECTION, SOLUTION INTRAVENOUS PRN
Status: DISCONTINUED | OUTPATIENT
Start: 2022-10-03 | End: 2022-10-17 | Stop reason: HOSPADM

## 2022-10-03 RX ORDER — PROPOFOL 10 MG/ML
INJECTION, EMULSION INTRAVENOUS
Status: COMPLETED
Start: 2022-10-03 | End: 2022-10-03

## 2022-10-03 RX ORDER — MIDAZOLAM HYDROCHLORIDE 2 MG/2ML
4 INJECTION, SOLUTION INTRAMUSCULAR; INTRAVENOUS ONCE
Status: COMPLETED | OUTPATIENT
Start: 2022-10-03 | End: 2022-10-03

## 2022-10-03 RX ORDER — FENTANYL CITRATE 50 UG/ML
INJECTION, SOLUTION INTRAMUSCULAR; INTRAVENOUS
Status: COMPLETED
Start: 2022-10-03 | End: 2022-10-03

## 2022-10-03 RX ORDER — POTASSIUM CHLORIDE 7.45 MG/ML
10 INJECTION INTRAVENOUS PRN
Status: DISCONTINUED | OUTPATIENT
Start: 2022-10-03 | End: 2022-10-09

## 2022-10-03 RX ORDER — DIPHENHYDRAMINE HYDROCHLORIDE 50 MG/ML
25 INJECTION INTRAMUSCULAR; INTRAVENOUS ONCE
Status: COMPLETED | OUTPATIENT
Start: 2022-10-03 | End: 2022-10-03

## 2022-10-03 RX ORDER — POTASSIUM CHLORIDE 29.8 MG/ML
20 INJECTION INTRAVENOUS PRN
Status: DISCONTINUED | OUTPATIENT
Start: 2022-10-03 | End: 2022-10-09

## 2022-10-03 RX ORDER — DEXMEDETOMIDINE HYDROCHLORIDE 4 UG/ML
.1-1.5 INJECTION, SOLUTION INTRAVENOUS CONTINUOUS
Status: DISCONTINUED | OUTPATIENT
Start: 2022-10-03 | End: 2022-10-04

## 2022-10-03 RX ORDER — VECURONIUM BROMIDE 1 MG/ML
INJECTION, POWDER, LYOPHILIZED, FOR SOLUTION INTRAVENOUS
Status: COMPLETED
Start: 2022-10-03 | End: 2022-10-03

## 2022-10-03 RX ORDER — MIDAZOLAM HYDROCHLORIDE 2 MG/2ML
2 INJECTION, SOLUTION INTRAMUSCULAR; INTRAVENOUS ONCE
Status: COMPLETED | OUTPATIENT
Start: 2022-10-03 | End: 2022-10-03

## 2022-10-03 RX ORDER — CHLORHEXIDINE GLUCONATE 0.12 MG/ML
15 RINSE ORAL 2 TIMES DAILY
Status: DISCONTINUED | OUTPATIENT
Start: 2022-10-03 | End: 2022-10-04

## 2022-10-03 RX ORDER — SODIUM CHLORIDE 0.9 % (FLUSH) 0.9 %
5-40 SYRINGE (ML) INJECTION EVERY 12 HOURS SCHEDULED
Status: DISCONTINUED | OUTPATIENT
Start: 2022-10-03 | End: 2022-10-17 | Stop reason: HOSPADM

## 2022-10-03 RX ORDER — 0.9 % SODIUM CHLORIDE 0.9 %
2000 INTRAVENOUS SOLUTION INTRAVENOUS ONCE
Status: COMPLETED | OUTPATIENT
Start: 2022-10-03 | End: 2022-10-03

## 2022-10-03 RX ORDER — SODIUM CHLORIDE 0.9 % (FLUSH) 0.9 %
5-40 SYRINGE (ML) INJECTION PRN
Status: DISCONTINUED | OUTPATIENT
Start: 2022-10-03 | End: 2022-10-17 | Stop reason: HOSPADM

## 2022-10-03 RX ORDER — VANCOMYCIN HYDROCHLORIDE 1 G/200ML
1000 INJECTION, SOLUTION INTRAVENOUS ONCE
Status: COMPLETED | OUTPATIENT
Start: 2022-10-03 | End: 2022-10-03

## 2022-10-03 RX ORDER — FAMOTIDINE 10 MG/ML
20 INJECTION, SOLUTION INTRAVENOUS 2 TIMES DAILY
Status: DISCONTINUED | OUTPATIENT
Start: 2022-10-03 | End: 2022-10-03

## 2022-10-03 RX ORDER — ACETAMINOPHEN 650 MG/1
650 SUPPOSITORY RECTAL EVERY 6 HOURS PRN
Status: DISCONTINUED | OUTPATIENT
Start: 2022-10-03 | End: 2022-10-17 | Stop reason: HOSPADM

## 2022-10-03 RX ORDER — ONDANSETRON 2 MG/ML
4 INJECTION INTRAMUSCULAR; INTRAVENOUS EVERY 6 HOURS PRN
Status: DISCONTINUED | OUTPATIENT
Start: 2022-10-03 | End: 2022-10-17 | Stop reason: HOSPADM

## 2022-10-03 RX ORDER — PROPOFOL 10 MG/ML
5-50 INJECTION, EMULSION INTRAVENOUS CONTINUOUS
Status: DISCONTINUED | OUTPATIENT
Start: 2022-10-03 | End: 2022-10-04

## 2022-10-03 RX ORDER — ACETAMINOPHEN 650 MG/1
650 SUPPOSITORY RECTAL ONCE
Status: COMPLETED | OUTPATIENT
Start: 2022-10-03 | End: 2022-10-03

## 2022-10-03 RX ORDER — POLYETHYLENE GLYCOL 3350 17 G/17G
17 POWDER, FOR SOLUTION ORAL DAILY PRN
Status: DISCONTINUED | OUTPATIENT
Start: 2022-10-03 | End: 2022-10-17 | Stop reason: HOSPADM

## 2022-10-03 RX ORDER — MAGNESIUM SULFATE IN WATER 40 MG/ML
2000 INJECTION, SOLUTION INTRAVENOUS ONCE
Status: COMPLETED | OUTPATIENT
Start: 2022-10-03 | End: 2022-10-04

## 2022-10-03 RX ORDER — ONDANSETRON 4 MG/1
4 TABLET, ORALLY DISINTEGRATING ORAL EVERY 8 HOURS PRN
Status: DISCONTINUED | OUTPATIENT
Start: 2022-10-03 | End: 2022-10-17 | Stop reason: HOSPADM

## 2022-10-03 RX ORDER — PROPOFOL 10 MG/ML
INJECTION, EMULSION INTRAVENOUS
Status: DISCONTINUED
Start: 2022-10-03 | End: 2022-10-03

## 2022-10-03 RX ADMIN — CHLORHEXIDINE GLUCONATE 15 ML: 1.2 SOLUTION ORAL at 21:16

## 2022-10-03 RX ADMIN — VECURONIUM BROMIDE 10 MG: 1 INJECTION, POWDER, LYOPHILIZED, FOR SOLUTION INTRAVENOUS at 15:22

## 2022-10-03 RX ADMIN — ACETAMINOPHEN 650 MG: 650 SUPPOSITORY RECTAL at 15:50

## 2022-10-03 RX ADMIN — IOPAMIDOL 150 ML: 755 INJECTION, SOLUTION INTRAVENOUS at 15:36

## 2022-10-03 RX ADMIN — SODIUM CHLORIDE: 9 INJECTION, SOLUTION INTRAVENOUS at 21:10

## 2022-10-03 RX ADMIN — MIDAZOLAM 4 MG: 1 INJECTION INTRAMUSCULAR; INTRAVENOUS at 15:03

## 2022-10-03 RX ADMIN — PIPERACILLIN AND TAZOBACTAM 3375 MG: 3; .375 INJECTION, POWDER, FOR SOLUTION INTRAVENOUS at 21:12

## 2022-10-03 RX ADMIN — CEFTRIAXONE SODIUM 1000 MG: 1 INJECTION, POWDER, FOR SOLUTION INTRAMUSCULAR; INTRAVENOUS at 15:50

## 2022-10-03 RX ADMIN — SODIUM CHLORIDE: 9 INJECTION, SOLUTION INTRAVENOUS at 19:00

## 2022-10-03 RX ADMIN — Medication 50 MCG/HR: at 13:47

## 2022-10-03 RX ADMIN — SODIUM CHLORIDE, PRESERVATIVE FREE 10 ML: 5 INJECTION INTRAVENOUS at 21:12

## 2022-10-03 RX ADMIN — PROPOFOL 50 MCG/KG/MIN: 10 INJECTION, EMULSION INTRAVENOUS at 22:45

## 2022-10-03 RX ADMIN — PROPOFOL 1000 MG: 10 INJECTION, EMULSION INTRAVENOUS at 22:41

## 2022-10-03 RX ADMIN — VANCOMYCIN HYDROCHLORIDE 1000 MG: 1 INJECTION, SOLUTION INTRAVENOUS at 16:18

## 2022-10-03 RX ADMIN — FAMOTIDINE 20 MG: 10 INJECTION, SOLUTION INTRAVENOUS at 14:49

## 2022-10-03 RX ADMIN — ENOXAPARIN SODIUM 40 MG: 100 INJECTION SUBCUTANEOUS at 21:15

## 2022-10-03 RX ADMIN — SODIUM CHLORIDE, PRESERVATIVE FREE 20 MG: 5 INJECTION INTRAVENOUS at 21:12

## 2022-10-03 RX ADMIN — DIPHENHYDRAMINE HYDROCHLORIDE 25 MG: 50 INJECTION, SOLUTION INTRAMUSCULAR; INTRAVENOUS at 15:48

## 2022-10-03 RX ADMIN — PROPOFOL 50.38 MCG/KG/MIN: 10 INJECTION, EMULSION INTRAVENOUS at 13:42

## 2022-10-03 RX ADMIN — DEXMEDETOMIDINE HYDROCHLORIDE 0.1 MCG/KG/HR: 4 INJECTION, SOLUTION INTRAVENOUS at 17:14

## 2022-10-03 RX ADMIN — MIDAZOLAM 2 MG: 1 INJECTION INTRAMUSCULAR; INTRAVENOUS at 14:37

## 2022-10-03 RX ADMIN — SODIUM CHLORIDE 2000 ML: 9 INJECTION, SOLUTION INTRAVENOUS at 15:17

## 2022-10-03 RX ADMIN — VECURONIUM BROMIDE 10 MG: 1 INJECTION, POWDER, LYOPHILIZED, FOR SOLUTION INTRAVENOUS at 13:46

## 2022-10-03 RX ADMIN — FENTANYL CITRATE 100 MCG: 50 INJECTION, SOLUTION INTRAMUSCULAR; INTRAVENOUS at 13:39

## 2022-10-03 ASSESSMENT — PULMONARY FUNCTION TESTS
PIF_VALUE: 20
PIF_VALUE: 21
PIF_VALUE: 16
PIF_VALUE: 20
PIF_VALUE: 16
PIF_VALUE: 20
PIF_VALUE: 21
PIF_VALUE: 21
PIF_VALUE: 19

## 2022-10-03 NOTE — PLAN OF CARE
Problem: Respiratory - Adult  Goal: Achieves optimal ventilation and oxygenation  Outcome: Progressing       Problem: OXYGENATION/RESPIRATORY FUNCTION  Goal: Patient will maintain patent airway  Outcome: Ongoing  Goal: Patient will achieve/maintain normal respiratory rate/effort  Respiratory rate and effort will be within normal limits for the patient  Outcome: Ongoing    Problem: MECHANICAL VENTILATION  Goal: Patient will maintain patent airway  Outcome: Ongoing  Goal: Oral health is maintained or improved  Outcome: Ongoing  Goal: ET tube will be managed safely  Outcome: Ongoing  Goal: Ability to express needs and understand communication  Outcome: Ongoing  Goal: Mobility/activity is maintained at optimum level for patient  Outcome: Ongoing    Problem: ASPIRATION PRECAUTIONS  Goal: Patients risk of aspiration is minimized  Outcome: Ongoing    Problem: SKIN INTEGRITY  Goal: Skin integrity is maintained or improved  Outcome: Ongoing

## 2022-10-03 NOTE — PROGRESS NOTES
SPIRITUAL CARE DEPARTMENT - [address]  PROGRESS NOTE    Shift date: 10/03/22  Shift day: Monday   Shift # 2    Room # 6470/2721-02   Name: Julia Calabrese                Alevism:    Place of Taoist:     Referral:  Other [place]    Admit Date & Time: 10/3/2022  1:07 PM    Assessment:  Julia Calabrese is a 59 y.o. female. Family member at bedside appeared tearful, coping, and sad. Intervention:  Writer introduced self and title as  Writer offered space for Family member  to express feelings, needs, and concerns and provided a ministry presence. While bernardo/spirituality were not discussed, family member was receptive to  presence. Outcome:  Family member contacted RN for assistance in room. Plan:  Chaplains will remain available to offer spiritual and emotional support as needed.       Electronically signed by Erwin Rivera on 10/3/2022 at 6:37 PM.  [facility]  [phone]

## 2022-10-03 NOTE — PROGRESS NOTES
[address] [facility] [address]  PROGRESS NOTE    Shift date: 10.3.2022  Shift day: Monday   Shift # 2    Room # 12/12   Name: Tyra Weeks                Islam: unknown   Place of Faith: unknown    Referral: Routine Visit    Admit Date & Time: 10/3/2022  1:07 PM    Assessment:  Tyra Weeks is a 59 y.o. female in the hospital because of altered mental status. Upon entering the room writer observes family member bedside and tearful. States that she found the patient was not responding. Mentioned previous mental health issues and therefore keeps a camera in the patient's room. Family just last month had a loved one in the hospital for a long duration of time and seems to be overwhelmed by the new hospitalization. Intervention:  Writer introduced self and title as  Writer offered space for the family member  to express feelings, needs, and concerns and provided a ministry presence. Determined support bedside. Outcome:  Appears tearful and overwhelmed    Plan:  Chaplains will remain available to offer spiritual and emotional support as needed. Electronically signed by Colleen Diez on 10/3/2022 at 5:59 PM.  [facility]  [phone]       10/03/22 1740   Encounter Summary   Service Provided For: Family   Referral/Consult From: [facility] Family members   Last Encounter  10/03/22   Complexity of Encounter Moderate   Begin Time 1740   End Time  1750   Total Time Calculated 10 min   Encounter    Type Follow up   Assessment/Intervention/Outcome   Assessment Tearful   Intervention Active listening;Discussed illness injury and its impact; Explored/Affirmed feelings, thoughts, concerns;Sustaining Presence/Ministry of presence   Outcome Engaged in conversation;Expressed feelings, needs, and concerns     Electronically signed by Sam Giles on 10/3/2022 at 5:59 PM

## 2022-10-03 NOTE — ED NOTES
Pt not toleratinf CT scan, Dr Wing Parsons and Dr Marcio Flor updated orders recd and passed on to  ER JAYA Camacho RN  10/03/22 [id number]

## 2022-10-03 NOTE — ED NOTES
Blood culture x one sent. Repeat lactic acid and trop obtained and sent. Urine specimen obtained and sent. ICU team at bedside pt remains restless and pulling at lines. Dr Micki Echols updated.      Devlin JAYA Box  10/03/22 [id number]

## 2022-10-03 NOTE — ED PROVIDER NOTES
This patient was signed out to me by Dr. Jose Luis Carter at the completion of his shift. Patient with history of metastatic ovarian cancer, undergoing chemotherapy, now presenting with acutely altered mental status. Intubated, workup in progress, anticipate admission to critical care. Dixon Mcclain MD  10/03/22 4660    CT and radiologist interpretation of been reviewed. No obvious acutely abnormal or diagnostic findings noted. Impression: Altered mental status of undetermined etiology with history of metastatic ovarian cancer. Plan: Patient will be admitted to the medical intensive care unit for further evaluation and treatment.      Dixon Mcclain MD  10/03/22 3445

## 2022-10-03 NOTE — ED NOTES
Proprofel initaed at 20 mcg/kg or 7.4 ml.    With 20 mcg bolus, pt \"fighting tube\"     Elisabet Lay RN  10/03/22 7665

## 2022-10-03 NOTE — ED NOTES
Remains HTN-dr Jama updated.      Arden Santos, RN  10/03/22 [facility], RN  10/03/22 5721

## 2022-10-03 NOTE — H&P
Critical Care - History and Physical Examination    Patient's name:  Madisyn Werner  Medical Record Number: [id number]  Patient's account/billing number: [de-identified]  Patient's YOB: 1965  Age: 59 y.o. Date of Admission: 10/3/2022  1:07 PM  Date of History and Physical Examination: 10/3/2022      Primary Care Physician: Jose Khan DO  Attending Physician: Damon Durán    Code Status: Prior    Chief complaint:   Chief Complaint   Patient presents with    Altered Mental Status         HISTORY OF PRESENT ILLNESS:      History was obtained from chart review and unobtainable from patient due to intubation. Madisyn Werner is a 59 y.o. with PMH of   - ovarian cancer with metastasis intraabdominally, to the spleen, and lungs  - cervical cancer  - Anemia  - Bleeding  - Diabetes  - GERD  - Depression    Presented to ER with altered mental status by her daughter who found her and believed that she was just tired. Upon recheck, she was found to be significantly worse and was brought in. BG was taken and found to be low. Patient's last well-known time was 2 October 2022 at approximately 11 PM.  Symptoms started suddenly and are likely Associated with Metastatic Cancer. Upon arrival patient was able to open their eyes to pain but unable to maintain, the patient was also unable to follow simple commands. Author Showman GCS was  low and though patient was protecting airway, the low score with + SIRS lead to intubation. The intubation 20 mg of etomidate and 80 mg of succinylcholine were given via IV. A 7.5 tube at 24 cm was placed and confirmed with x-ray. While the patient was initially combative and intolerant of the ET tube, boluses of propofol were given to sedate the patient successfully.   No other history was obtained by the emergency medicine team.  2 L of normal saline were given throughout the patient stay in the emergency department    Upon admission, pt was AMS, hypertensive, tachycardic and febrile. Admission VBG showed pH of 7.476 , PCO2 of 34.1, HCO3 of 25.2 and Lactic of 1.56  While in the ER, CTs and plain films were reviewed and scanned showing no obvious acutely abnormal or diagnostic findings that were noted. Attending left note with impression of altered mental status of undetermined etiology with a history of metastatic ovarian cancer. Their plan is to admit to our intensive care unit for further evaluation and treatment. 1510 Glucose, Random(!): 158 [MZ]   1511 Creatinine(!): 0.42 [MZ]   1511 POC Glucose(!): 155 [MZ]   1511 POC Lactic Acid(!): 1.56 [MZ]   1511 POC Ionized Calcium(!): 0.98 [MZ]   1511 POC Creatinine(!): 0.38 [MZ]   1511 Hemoglobin and hematocrit, blood(!):    POC Hemoglobin 17. 1(!)   POC Hematocrit 50(!) [MZ]   1511 pH, Steffen(!): 7.476 [MZ]   1511 pCO2, Steffen(!): 34.1 [MZ]   1511 Magnesium(!): 1.5 [MZ]   1512 CRP(!): 21.4 [MZ]         Xray showed   IMPRESSION:  1. Endotracheal tube tip lies 5 cm above the alejandro. 2. Enteric tube in the stomach with the tip at the level of the proximal to  mid gastric body. 3. Stable right mid I a frame elevation resulting in right hemithorax volume  loss. 4. There is progressive curvilinear right lung base opacity which may  represent progressive atelectasis. 5. Stable perihilar interstitial reticulonodular densities. No acute  consolidation or pulmonary edema. Pt was given propofol   1000 mg per 100 mL injection  Fentanyl 50 mcg/mL 50 mL infusion  Fentanyl 100 mcg over 2 mL injection  Acetaminophen suppository 650 mg  Vecuronium injection 10 mg  Chlorhexidine 0.12% solution 15 mL  Famotidine injection 20 mg  Midazolam PF injection 2 mg  0.9% sodium chloride bolus        PAST MEDICAL HISTORY:         Diagnosis Date    Anemia     Bleeding 10/2020    intra-abdominal bleeding -due to splenic mass with GI infiltration.   Status post embolization    Cervical cancer (HCC)     Depression     Diabetes mellitus ([facility] [address] GERD (gastroesophageal reflux disease)     Hx of blood clots     Hypertension     Metastatic cancer ([facility] [address] 10/2020    extensive intraabdominal and splenic involvement and lung mets. Ovarian cancer ([facility] [address]     low grade serous ovarian carcinoma    Post chemo evaluation     2007: Chemo via med onc (Dr. Richard Bowden), 2008: Erroll Creed due to rising CA-125, 2013: intraperitoneal chemo,12/2015: Ca125 - 25     Splenic lesion          PAST SURGICAL HISTORY:         Procedure Laterality Date    ABSCESS DRAINAGE  2013    Franca rectal    ANUS SURGERY      ANAL FISSURECTOMY    CARDIAC CATHETERIZATION      COLECTOMY  03/2013    ex lap, tumor debulking, transverse colectomy w reanastamosis, subgastric omentectomy, intraperitoneal port placement    HYSTERECTOMY, TOTAL ABDOMINAL (CERVIX REMOVED)      IR EMBOLIZATION HEMORRHAGE  10/05/2020    intra-abdominal bleeding -due to splenic mass with GI infiltration. Status post embolization [employer] interlock coils x7. mri condtional 3t ok, safe immediately post implant. IR PORT PLACEMENT EQUAL OR GREATER THAN 5 YEARS  08/24/2020    IR PORT PLACEMENT EQUAL OR GREATER THAN 5 YEARS 8/24/2020 King Manjarrez MD [facility]    PORT SURGERY      IP Port    TONSILLECTOMY         ALLERGIES:      Allergies   Allergen Reactions    Carboplatin Anaphylaxis    Ceftriaxone      Had a rash on ceftriaxone December 2020, [address] MEDS: :      Prior to Admission medications    Medication Sig Start Date End Date Taking? Authorizing Provider   morphine (MS CONTIN) 15 MG extended release tablet TAKE 1 TABLET BY MOUTH 2 TIMES DAILY FOR 30 DAYS.  9/28/22 10/28/22  Chantal Aguilar MD   lamoTRIgine (LAMICTAL) 25 MG tablet Take 5 tablets by mouth 2 times daily 9/22/22   Shanda Hinds,    LORazepam (ATIVAN) 1 MG tablet TAKE 1 TABLET BY MOUTH EVERY 8 HOURS AS NEEDED FOR ANXIETY FOR UP TO 30 DAYS. 9/21/22 10/21/22  Chantal Aguilar MD   morphine (MS CONTIN) 30 MG extended release tablet TAKE 1 TABLET BY MOUTH 2 TIMES DAILY FOR 30 DAYS. 9/21/22 10/21/22  Cathy Bhatia MD   sertraline (ZOLOFT) 100 MG tablet TAKE 1 TABLET BY MOUTH DAILY 9/21/22 10/21/22  Yuriy Ramirez MD   oxyCODONE-acetaminophen (PERCOCET) 5-325 MG per tablet Take 1 tablet by mouth every 4 hours as needed for Pain for up to 30 days. 9/13/22 10/13/22  Cathy Bhatia MD   FEROSUL 325 (65 Fe) MG tablet TAKE 1 TABLET BY MOUTH ONCE DAILY WITH BREAKFAST 9/9/22   Cathy Bhatia MD   levETIRAcetam (KEPPRA) 1000 MG tablet TAKE 1 TABLET (1,000 MG TOTAL) BY MOUTH IN THE MORNING AND 1 TABLET (1,000 MG TOTAL) BEFORE BEDTIME. 9/9/22   Cathy Bhatia MD   FEROSUL 325 (65 Fe) MG tablet TAKE 1 TABLET (325 MG TOTAL) BY MOUTH DAILY WITH BREAKFAST. 9/8/22   Shanda Jeremykobreanna, DO   pantoprazole (PROTONIX) 40 MG tablet Take 1 tablet by mouth daily 9/1/22   Cathy Bhatia MD   Lactobacillus (ACIDOPHILUS) CAPS capsule TAKE 1 TABLET BY MOUTH ONCE DAILY IN THE MORNING AND 1 TABLET BEFORE BEDTIME 8/31/22   Shanda Medhkour, DO   furosemide (LASIX) 20 MG tablet TAKE 1 TABLET (20 MG TOTAL) BY MOUTH DAILY.  8/29/22   Shanda Medhkour, DO   dicyclomine (BENTYL) 20 MG tablet TAKE 1 TABLET (20 MG TOTAL) BY MOUTH IN THE MORNING AND 1 TABLET (20 MG TOTAL) BEFORE BEDTIME. 8/29/22   Shanda Medhkour, DO   potassium chloride (MICRO-K) 10 MEQ extended release capsule TAKE 1 CAPSULE (10 MEQ TOTAL) BY MOUTH IN THE MORNING. 8/29/22   Shanda Medhkour, DO   Handicap Placard MISC 5 years 7/19/22   Ibis Spangler Medevettekobreanna, DO   loperamide (IMODIUM) 2 MG capsule Take 2 mg by mouth 4 times daily as needed for Diarrhea    Historical Provider, MD   ondansetron (ZOFRAN-ODT) 4 MG disintegrating tablet Take 1 tablet by mouth every 8 hours as needed for Nausea or Vomiting 5/13/22   Yuriy Ramirez MD   senna (SENOKOT) 8.6 MG tablet Take 1 tablet by mouth 2 times daily 5/2/22 5/2/23  Carolin Hwang MD   melatonin 5 MG TABS tablet Take 1 tablet by mouth daily 22   Shanda Medhkour, DO   benzonatate (TESSALON PERLES) 100 MG capsule Take 1 capsule by mouth 3 times daily as needed for Cough 22   Shanda Medhkour, DO   apixaban (ELIQUIS) 5 MG TABS tablet Take 1 tablet by mouth 2 times daily 22   Osmin Hadley MD   hydrocortisone (ANUSOL-HC) 2.5 % CREA rectal cream Apply on affected area twice daily 22   Issac Leyva MD       SOCIAL HISTORY:       TOBACCO:   reports that she has been smoking cigarettes. She has a 20.00 pack-year smoking history. She has never used smokeless tobacco.  ETOH:   reports that she does not currently use alcohol. DRUGS:  reports no history of drug use. OCCUPATION:  n/a     FAMILY HISTORY:          Problem Relation Age of Onset    Alcohol Abuse Mother     Cirrhosis Mother        REVIEW OF SYSTEMS (ROS):     Review of Systems -   A full review of systems was unable to be completed due to the status of the patient; intubated, sedated, unable to respond.   OBJECTIVE:     VITAL SIGNS:  BP (!) 191/103   Pulse (!) 113   Temp (!) 100.6 °F (38.1 °C) (Oral)   Resp 16   Ht 5' 5\" (1.651 m)   Wt 135 lb (61.2 kg)   SpO2 98%   BMI 22.47 kg/m²   Tmax over 24 hours:  Temp (24hrs), Av.6 °F (38.1 °C), Min:100.6 °F (38.1 °C), Max:100.6 °F (38.1 °C)      Patient Vitals for the past 8 hrs:   BP Temp Temp src Pulse Resp SpO2 Height Weight   10/03/22 1623 -- -- -- (!) 113 16 98 % -- --   10/03/22 1617 (!) 191/103 -- -- (!) 127 16 -- -- --   10/03/22 1546 (!) 179/87 -- -- (!) 109 16 100 % -- --   10/03/22 1545 -- -- -- (!) 109 18 100 % -- --   10/03/22 1544 (!) 180/100 -- -- (!) 111 14 100 % -- --   10/03/22 1516 -- -- -- (!) 105 24 99 % -- --   10/03/22 1502 (!) 172/84 -- -- (!) 124 28 91 % -- --   10/03/22 1458 -- -- -- (!) 109 23 95 % -- --   10/03/22 1457 (!) 218/185 -- -- (!) 124 20 (!) 85 % -- --   10/03/22 1447 (!) 202 -- -- (!) 131 17 93 % -- --   10/03/22 1424 -- -- -- (!) 134 17 96 % 5' 5\" (1.651 m) -- 10/03/22 1343 (!) 192/86 -- -- (!) 120 26 96 % -- --   10/03/22 1332 -- -- -- (!) 124 17 100 % -- --   10/03/22 1325 (!) 150/122 -- -- (!) 104 23 99 % -- --   10/03/22 1323 -- -- -- (!) 103 19 99 % -- --   10/03/22 1318 (!) 150/122 -- -- (!) 109 21 98 % -- --   10/03/22 1311 (!) 141/112 (!) 100.6 °F (38.1 °C) Oral (!) 101 16 100 % 5' 5\" (1.651 m) 135 lb (61.2 kg)       No intake or output data in the 24 hours ending 10/03/22 1634    Wt Readings from Last 3 Encounters:   10/03/22 135 lb (61.2 kg)   09/29/22 147 lb (66.7 kg)   09/01/22 151 lb 6.4 oz (68.7 kg)     Body mass index is 22.47 kg/m². PHYSICAL EXAM:  Physical Exam  Vitals and nursing note reviewed. Constitutional:       Appearance: She is normal weight. She is ill-appearing. Interventions: She is sedated, intubated and restrained. HENT:      Head: Normocephalic. Hair is abnormal (thinning). Mouth/Throat:      Mouth: Mucous membranes are dry. No oral lesions. Eyes:      Pupils: Pupils are equal, round, and reactive to light. Cardiovascular:      Rate and Rhythm: Tachycardia present. Heart sounds: Normal heart sounds. No murmur heard. No friction rub. No gallop. Pulmonary:      Effort: She is intubated. Abdominal:      General: Abdomen is flat. A surgical scar is present. Bowel sounds are normal.      Palpations: Abdomen is soft. There is no hepatomegaly, splenomegaly, mass or pulsatile mass. Hernia: No hernia is present. Musculoskeletal:         General: No swelling. Normal range of motion. Skin:     General: Skin is warm. Capillary Refill: Capillary refill takes less than 2 seconds. Neurological:      Mental Status: She is unresponsive. GCS: GCS eye subscore is 2. GCS verbal subscore is 1. GCS motor subscore is 3.         MEDICATIONS:  Scheduled Meds:   propofol        chlorhexidine  15 mL Mouth/Throat BID    famotidine (PEPCID) injection  20 mg IntraVENous BID    vancomycin  1,000 mg IntraVENous Once     Continuous Infusions:   propofol 50.381 mcg/kg/min (10/03/22 1342)    fentaNYL 50 mcg/mL 100 mcg/hr (10/03/22 1435)    dexmedetomidine       PRN Meds:            ABGs:   Lab Results   Component Value Date/Time    XIG4RQP NOT REPORTED 06/19/2021 03:25 AM    FIO2 30.0 06/19/2021 03:25 AM       DATA:  Complete Blood Count:   Recent Labs     10/03/22  1339   WBC 10.2   RBC 4.88   HGB 14.2   HCT 44.5   MCV 91.2   MCH 29.1   MCHC 31.9   RDW 17.1*      MPV 9.4        Last 3 Blood Glucose:   Recent Labs     10/03/22  1341   GLUCOSE 158*        PT/INR:    Lab Results   Component Value Date/Time    PROTIME 12.2 05/03/2022 05:28 PM    INR 1.2 05/03/2022 05:28 PM     PTT:    Lab Results   Component Value Date/Time    APTT 19.5 05/03/2022 05:28 PM       Comprehensive Metabolic Profile:   Recent Labs     10/03/22  1314 10/03/22  1341   NA  --  140   K  --  4.2   CL  --  100   CO2  --  26   BUN  --  8   CREATININE 0.38* 0.42*   GLUCOSE  --  158*   CALCIUM  --  9.0   PROT  --  7.0   LABALBU  --  4.3   BILITOT  --  0.3   ALKPHOS  --  98   AST  --  18   ALT  --  9      Magnesium:   Lab Results   Component Value Date/Time    MG 1.5 10/03/2022 01:41 PM    MG 1.6 05/03/2022 05:50 PM    MG 1.8 02/25/2022 06:10 AM     Phosphorus:   Lab Results   Component Value Date/Time    PHOS 3.6 06/22/2021 04:14 AM    PHOS 2.7 06/21/2021 04:49 AM    PHOS 2.8 06/20/2021 03:53 AM     Ionized Calcium:   Lab Results   Component Value Date/Time    CAION 1.07 06/19/2021 12:00 AM    CAION 1.17 01/14/2021 05:08 AM    CAION 1.14 01/13/2021 06:00 AM        Urinalysis:   Lab Results   Component Value Date/Time    NITRU NEGATIVE 10/03/2022 02:38 PM    COLORU Yellow 10/03/2022 02:38 PM    PHUR 6.5 10/03/2022 02:38 PM    WBCUA None 10/03/2022 02:38 PM    RBCUA 2 TO 5 10/03/2022 02:38 PM    MUCUS NOT REPORTED 02/20/2022 10:40 AM    TRICHOMONAS NOT REPORTED 02/20/2022 10:40 AM    YEAST NOT REPORTED 02/20/2022 10:40 AM    BACTERIA NOT REPORTED 02/20/2022 10:40 AM    SPECGRAV 1.018 10/03/2022 02:38 PM    LEUKOCYTESUR NEGATIVE 10/03/2022 02:38 PM    UROBILINOGEN Normal 10/03/2022 02:38 PM    BILIRUBINUR NEGATIVE 10/03/2022 02:38 PM    GLUCOSEU 1+ 10/03/2022 02:38 PM    KETUA SMALL 10/03/2022 02:38 PM    AMORPHOUS NOT REPORTED 02/20/2022 10:40 AM       HgBA1c:    Lab Results   Component Value Date/Time    LABA1C 5.2 04/13/2022 01:10 PM     TSH:    Lab Results   Component Value Date/Time    TSH 2.93 03/09/2021 05:43 PM       Lactic Acid:   Lab Results   Component Value Date/Time    LACTA NOT REPORTED 06/20/2021 09:39 AM    LACTA NOT REPORTED 06/20/2021 03:53 AM    LACTA NOT REPORTED 06/19/2021 11:52 PM      Troponin: No results for input(s): TROPONINI in the last 72 hours. Radiological imaging  XR CHEST PORTABLE    Result Date: 10/3/2022  EXAMINATION: ONE XRAY VIEW OF THE CHEST 10/3/2022 1:54 pm COMPARISON: Chest radiograph 06/03/2022. HISTORY: ORDERING SYSTEM PROVIDED HISTORY: AMS TECHNOLOGIST PROVIDED HISTORY: AMS FINDINGS: Single view provided. Right-sided subcutaneous medical infusion port with catheter tip in the distal superior vena cava. Endotracheal tube overlies tracheal air column with the tip 5 cm above the alejandro. Enteric tube follows the course of the esophagus crossing the GE junction and extending into the stomach with the tip at the level of the proximal to mid gastric body. Stable mediastinal and cardiac silhouettes with aortic atherosclerosis. Stable right hemidiaphragm elevation. Progressive right lung base curvilinear opacity. Stable bilateral perihilar interstitial reticulonodular densities. No lobar consolidation. No effusion or pneumothorax. No free subdiaphragmatic air. Stable embolization coils of the left upper quadrant. Redemonstration of cholelithiasis. 1. Endotracheal tube tip lies 5 cm above the alejandro. 2. Enteric tube in the stomach with the tip at the level of the proximal to mid gastric body.  3. Stable right mid I a frame elevation resulting in right hemithorax volume loss. 4. There is progressive curvilinear right lung base opacity which may represent progressive atelectasis. 5. Stable perihilar interstitial reticulonodular densities. No acute consolidation or pulmonary edema. ASSESSMENT:     Principal Problem:    Septicemia ([address]  Active Problems:    AMS (altered mental status)  Resolved Problems:    * No resolved hospital problems. *      PLAN:     ICU PROPHYLAXIS:  Stress ulcer:  [] PPI Agent  [x] [id number] [] Sucralfate  [] Other:  VTE:   [] Enoxaparin  [x] Unfract. Heparin Subcut  [] EPC Cuffs    NUTRITION:  [x] NPO  [] Tube Feeding (Specify: will move to tube feed after read of CT) [] TPN  [] PO    CONSULTATION NEEDED:  [] No   [x] Yes     HOME MEDICATIONS RECONCILED: [x] No  [] Yes    FAMILY UPDATED:    [x] No   [] Yes     ADDITIONAL PLAN:  Admission to the ICU  Patient to remain intubated until improved  Discussion with family will need to be had as well as consult with palliative care    Neuro:  Continue to keep patient sedated while intubated, neurochecks per protocol  Sedation: propofol  Pain control: Tylenol, propofol, fentanyl    CV:  Telemetry  BP and HR to be maintained within normal limits  Map to be greater than 65      Heme:  CBC daily    Resp:  RR and SpO2 to be maintained above 10 and between 92 and 99 respectively  Vent settings:  FiO2 initial 40  SPO2 goal 90 to 96%  Assist-control mode  Tidal 8 mg/kg ideal body weight  Rate 16/min  PEEP 5    /Fluids/Electrolytes:  Daily BMP    GI/Nutrition:  NPO moving to tube feed diet  once cleared  Bowel regimen: colace, glycolax     ID:  Normal white blood cell count likely due to a lack of inability to mount an immune response, however given other signs and symptoms met by SIRS criteria and sepsis protocol it is quite likely that the patient is septic and as such should be treated regardless of white blood cell count.   Ceftriaxone + Vancomycin be given initially until sensitivities from culture are determined    Hematology:  H/H: 4.88/14.2  Normal platelet count    Endo:  Monitor glucose, last check had glucose at 158, while elevated this is not concerning      Musculoskeletal:  Restrictions: bedridden with passive ROM  DVT prophylaxis with EPC cuffs  GI prophylaxis with famotidine    Lines/Drains:  Lumen implantable port   placed  06/24/2022  Peripheral IV in right wrist  placed 10/03/2022  Peripheral IV in left forearm  placed 10/03/2022  Urinary catheter   placed 10/03/2022       Discharge Needs:  PT, OT, ST, SW, and Case Management        Kami Plummer MD, MD  ICU resident   [facility]  10/3/2022 4:34 PM    The critical care team assigned to the patient will be following up the patient in the intensive care unit. I have discussed the current plan with the critical care attending. The above mentioned assessment and plan will be reviewed again in detail by the critical care attending at bedside, and can be further changed or modified accordingly by the attending physician.

## 2022-10-03 NOTE — ED NOTES
7.5 - 24 AT LIP  Positive color change  Positive fogging in tube  No abd breath sounds noted with positive bilateral BREATH SOUNDS BILATERALLY-LUNG CARMONA PER DR RAMOS.       Orlando Pinon, RN  10/03/22 [place] JAYA Lewis  10/03/22 [id number]

## 2022-10-03 NOTE — ED PROVIDER NOTES
[facility]     Emergency Department     Faculty Attestation    I performed a history and physical examination of the patient and discussed management with the resident. I have reviewed and agree with the residents findings including all diagnostic interpretations, and treatment plans as written at the time of my review. Any areas of disagreement are noted on the chart. I was personally present for the key portions of any procedures. I have documented in the chart those procedures where I was not present during the key portions. For Physician Assistant/ Nurse Practitioner cases/documentation I have personally evaluated this patient and have completed at least one if not all key elements of the E/M (history, physical exam, and MDM). Additional findings are as noted. Primary Care Physician: Sylwia Sanchez DO    History: This is a 59 y.o. female who presents to the Emergency Department with complaint of altered mental status. Last known well was sometime last evening. Found by caregiver where she was noted to not acting her normal self. No other further history can be obtained from the patient's secondary to her condition. Per EMS the patient has ovarian cancer. Patient has had some emesis prior to arrival.    Physical:   height is 5' 5\" (1.651 m) and weight is 135 lb (61.2 kg). Her oral temperature is 100.6 °F (38.1 °C) (abnormal). Her blood pressure is 150/122 (abnormal) and her pulse is 103 (abnormal). Her respiration is 19 and oxygen saturation is 99%. Patient will open eyes to painful stimulus does not follow commands. Lungs occasional rhonchi auscultated heart tachycardic with a regular rhythm, abdomen is soft, patient moves extremities to deep pain similes.     Impression: Altered mental status    Plan: CT, x-ray, CBC, CMP, lactic acid, Lamictal level, Keppra level, EKG, troponin, urinalysis, urine culture, blood culture, ammonia level, IV fluid. Review of the patient's chart noted the patient to have metastatic ovarian cancer with mets to the spleen lung and bone. EKG Interpretation    Interpreted by me  Last tachycardia ventricular 129, normal [place] interval, normal QRS duration, right superior axis deviation, normal QT corrected, nonspecific ST abnormality  Compared to Oralia 3, 2022, ventricular rate is increased by 20      CRITICAL CARE: There was a high probability of clinically significant/life threatening deterioration in this patient's condition which required my urgent intervention. Total critical care time was 30 minutes. This excludes any time for separately reportable procedures. Due to the patient's low GCS and concerns for airway protection who decided to intubate the patient. Patient underwent rapid sequence intubation with the resident intubating the patient. I was present during the entire procedure please see the resident note for full documentation. Patient was able to be intubated on the first pass attempt via video laryngoscopy. I visualized the endotracheal tube passing through the cords. Critical care has been consulted on this patient. (Please note that portions of this note were completed with a voice recognition program.  Efforts were made to edit the dictations but occasionally words are mis-transcribed.)    Theotis Alpers.  Sofía Puentes MD, [facility]  Attending Emergency Medicine Physician        Jeffery Lamas MD  10/03/22 0404

## 2022-10-03 NOTE — ED NOTES
propofol gtt increased to 30 mcg with a 50 mcg bolus due to intolerance of ENT Tube     Donta Samuel, JAYA  10/03/22 0752

## 2022-10-03 NOTE — ED NOTES
DR Jose Ceron at bedside. Update given. Report given to on coming shift. To CT. Appears restful at this time.       Willian Lancaster RN  10/03/22 2104

## 2022-10-03 NOTE — ED NOTES
First liter . 9NS infused per verbal order Dr Esteban Stone and second liter up and infusing     Edd Glover RN  10/03/22 9667

## 2022-10-03 NOTE — ED NOTES
Pt reported to had been found by family as \"sleepy\" this morning and \"when they went in to check on her later\" \"AMS\" and \"difficult to arouse\"; EMS called. KOKO [address] last night approximately at 2300. Pt opens eyes to pain but unable to maintain, pt unable to follow simple commands.        Edd Glover RN  10/03/22 7152

## 2022-10-03 NOTE — ED NOTES
Labs and Blood culture x1 obtained per Ignacio Garg and sent to lab     Colette Stover RN  10/03/22 9923

## 2022-10-03 NOTE — ED PROVIDER NOTES
[facility]  ED  Emergency Department Encounter  Emergency Medicine Resident     Pt Name: Edmundo Esquivel  OCQ:[id number]  [place] 1965  Date of evaluation: 10/3/22  PCP:  Brady Ta DO    CHIEF COMPLAINT       Chief Complaint   Patient presents with    Altered Mental Status       HISTORY OF PRESENT ILLNESS  (Location/Symptom, Timing/Onset, Context/Setting, Quality, Duration, ModifyingFactors, Severity.)      Edmundo Esquivel is a 59 y.o. female with PMH of history of ovarian cancer with significant mets all over the body presents to the emergency department as an altered mental status patient. Patient was found by her daughter and daughter thought she was just tired. Went back to recheck on her and finding her even more significantly altered. After this, emergency medical services were called. On arrival, patient's blood sugar was taken, GCS was very low however patient was protecting her airway. Patient without following any commands, would open eyes to pain and was moving all extremities spontaneously but not to command. Given patient's low GCS and significant findings of meeting SIRS criteria decision was made to intubate the patient for airway protection and and to ensure a proper work-up took place. No other history was able to be obtained    PAST MEDICAL / SURGICAL / SOCIAL /FAMILY HISTORY      has a past medical history of Anemia, Bleeding, Cervical cancer ([address] Depression, Diabetes mellitus ([address] GERD (gastroesophageal reflux disease), Hx of blood clots, Hypertension, Metastatic cancer ([address] Ovarian cancer ([address] Post chemo evaluation, and Splenic lesion. No other pertinent PMH on review with patient/guardian. has a past surgical history that includes Hysterectomy, total abdominal; Port Surgery; Tonsillectomy; IR PORT PLACEMENT > 5 YEARS (08/24/2020); Anus surgery; Abscess Drainage (2013); colectomy (03/2013);  IR EMBOLIZATION HEMORRHAGE (10/05/2020); and Cardiac catheterization. No other pertinent PSH on review with patient/guardian. Social History     Socioeconomic History    Marital status: Single     Spouse name: Not on file    Number of children: Not on file    Years of education: Not on file    Highest education level: Not on file   Occupational History    Not on file   Tobacco Use    Smoking status: Every Day     Packs/day: 1.00     Years: 20.00     Pack years: 20.00     Types: Cigarettes    Smokeless tobacco: Never   Vaping Use    Vaping Use: Never used   Substance and Sexual Activity    Alcohol use: Not Currently    Drug use: Never    Sexual activity: Not on file   Other Topics Concern    Not on file   Social History Narrative    Not on file     Social Determinants of Health     Financial Resource Strain: Medium Risk    Difficulty of Paying Living Expenses: Somewhat hard   Food Insecurity: Food Insecurity Present    Worried About Running Out of Food in the Last Year: Often true    Ran Out of Food in the Last Year: Sometimes true   Transportation Needs: Not on file   Physical Activity: Not on file   Stress: Not on file   Social Connections: Not on file   Intimate Partner Violence: Not on file   Housing Stability: Not on file       I counseled the patient against using tobacco products. Family History   Problem Relation Age of Onset    Alcohol Abuse Mother     Cirrhosis Mother      No other pertinent FamHx on review with patient/guardian. Allergies:  Carboplatin and Ceftriaxone    Home Medications:  Prior to Admission medications    Medication Sig Start Date End Date Taking? Authorizing Provider   morphine (MS CONTIN) 15 MG extended release tablet TAKE 1 TABLET BY MOUTH 2 TIMES DAILY FOR 30 DAYS.  9/28/22 10/28/22  Ino Boone MD   lamoTRIgine (LAMICTAL) 25 MG tablet Take 5 tablets by mouth 2 times daily 9/22/22   Shanda Hinds DO   LORazepam (ATIVAN) 1 MG tablet TAKE 1 TABLET BY MOUTH EVERY 8 HOURS AS NEEDED FOR ANXIETY FOR UP TO 30 DAYS. 9/21/22 10/21/22  Lexus Meneses MD   morphine (MS CONTIN) 30 MG extended release tablet TAKE 1 TABLET BY MOUTH 2 TIMES DAILY FOR 30 DAYS. 9/21/22 10/21/22  Jacquie Bhatia MD   sertraline (ZOLOFT) 100 MG tablet TAKE 1 TABLET BY MOUTH DAILY 9/21/22 10/21/22  Lexus Meneses MD   oxyCODONE-acetaminophen (PERCOCET) 5-325 MG per tablet Take 1 tablet by mouth every 4 hours as needed for Pain for up to 30 days. 9/13/22 10/13/22  Jacquie Bhatia MD   FEROSUL 325 (65 Fe) MG tablet TAKE 1 TABLET BY MOUTH ONCE DAILY WITH BREAKFAST 9/9/22   Jacquie Bhatia MD   levETIRAcetam (KEPPRA) 1000 MG tablet TAKE 1 TABLET (1,000 MG TOTAL) BY MOUTH IN THE MORNING AND 1 TABLET (1,000 MG TOTAL) BEFORE BEDTIME. 9/9/22   Jacquie Bhatia MD   FEROSUL 325 (65 Fe) MG tablet TAKE 1 TABLET (325 MG TOTAL) BY MOUTH DAILY WITH BREAKFAST. 9/8/22   Shanda Jeremykobreanna, DO   pantoprazole (PROTONIX) 40 MG tablet Take 1 tablet by mouth daily 9/1/22   Jacquie Bhatia MD   Lactobacillus (ACIDOPHILUS) CAPS capsule TAKE 1 TABLET BY MOUTH ONCE DAILY IN THE MORNING AND 1 TABLET BEFORE BEDTIME 8/31/22   Shanda Medhkour, DO   furosemide (LASIX) 20 MG tablet TAKE 1 TABLET (20 MG TOTAL) BY MOUTH DAILY.  8/29/22   Shanda Medhkour, DO   dicyclomine (BENTYL) 20 MG tablet TAKE 1 TABLET (20 MG TOTAL) BY MOUTH IN THE MORNING AND 1 TABLET (20 MG TOTAL) BEFORE BEDTIME. 8/29/22   Shanda Medhkour, DO   potassium chloride (MICRO-K) 10 MEQ extended release capsule TAKE 1 CAPSULE (10 MEQ TOTAL) BY MOUTH IN THE MORNING. 8/29/22   Shanda Medhkour, DO   Handicap Placard MISC 5 years 7/19/22   Lue Pathak Medhkour, DO   loperamide (IMODIUM) 2 MG capsule Take 2 mg by mouth 4 times daily as needed for Diarrhea    Historical Provider, MD   ondansetron (ZOFRAN-ODT) 4 MG disintegrating tablet Take 1 tablet by mouth every 8 hours as needed for Nausea or Vomiting 5/13/22   MD bri Grady (SENOKOT) 8.6 MG tablet Take 1 tablet by mouth 2 times daily 5/2/22 5/2/23  Mazin Colon MD   melatonin 5 MG TABS tablet Take 1 tablet by mouth daily 4/13/22   Shanda Medhkour, DO   benzonatate (TESSALON PERLES) 100 MG capsule Take 1 capsule by mouth 3 times daily as needed for Cough 4/13/22   Shanda Medhkour, DO   apixaban (ELIQUIS) 5 MG TABS tablet Take 1 tablet by mouth 2 times daily 4/8/22   María Browne MD   hydrocortisone (ANUSOL-HC) 2.5 % CREA rectal cream Apply on affected area twice daily 2/28/22   Matteo Mosquera MD       REVIEW OF SYSTEMS    (2-9 systems for level 4, 10 ormore for level 5)      Review of Systems   Unable to perform ROS: Acuity of condition     PHYSICAL EXAM   (up to 7 for level 4, 8 or more for level 5)      INITIAL VITALS:   BP (!) 192/86   Pulse (!) 134   Temp (!) 100.6 °F (38.1 °C) (Oral)   Resp 17   Ht 5' 5\" (1.651 m)   Wt 135 lb (61.2 kg)   SpO2 96%   BMI 22.47 kg/m²     Physical Exam  Vitals reviewed. Constitutional:       Appearance: She is well-developed. She is ill-appearing. She is not toxic-appearing. HENT:      Head: Normocephalic and atraumatic. Mouth/Throat:      Mouth: Mucous membranes are moist.      Pharynx: Oropharynx is clear. Eyes:      General: No scleral icterus. Extraocular Movements: Extraocular movements intact. Right eye: Normal extraocular motion. Left eye: Normal extraocular motion. Pupils: Pupils are equal, round, and reactive to light. Right eye: Pupil is reactive. Left eye: Pupil is reactive. Cardiovascular:      Rate and Rhythm: Regular rhythm. Tachycardia present. Heart sounds: Normal heart sounds. No murmur heard. No friction rub. Pulmonary:      Effort: Pulmonary effort is normal. No respiratory distress. Breath sounds: Normal breath sounds. No wheezing. Abdominal:      General: Bowel sounds are normal.      Palpations: Abdomen is soft. Musculoskeletal:         General: Normal range of motion.       Cervical back: Normal range of motion and neck supple. Lymphadenopathy:      Cervical: No cervical adenopathy. Skin:     Capillary Refill: Capillary refill takes 2 to 3 seconds. Comments: Hot and dry   Neurological:      Mental Status: She is disoriented and unresponsive. DIFFERENTIAL  DIAGNOSIS     DDX: Sepsis, urinary tract infection, encephalopathy, brain mass, intracranial hemorrhage, mass-effect    PLAN (LABS / IMAGING / EKG):  Orders Placed This Encounter   Procedures    Culture, Blood 1    Culture, Blood 1    COVID-19, Rapid    Culture, Urine    XR CHEST PORTABLE    CT HEAD WO CONTRAST    CTA HEAD NECK W CONTRAST    CT ABDOMEN PELVIS W IV CONTRAST Additional Contrast? None    ELECTROLYTES PLUS    Hemoglobin and hematocrit, blood    CALCIUM, IONIC (POC)    Ammonia    CBC with Auto Differential    Lactate, Sepsis    Urinalysis with Microscopic    Comprehensive Metabolic Panel    C-Reactive Protein    Magnesium    Blood gas, arterial    Elevate Head of Bed    Spontaneous Awakening Trial (SAT)    Inpatient consult to Critical Care    Inpatient consult to Dietitian    Spontaneous Breathing Trial (SBT)    Post Extubation Oxygen Therapy    Manual Mechanical Ventilation    Capnography    Pulse oximetry, continuous    Venous Blood Gas, POC    Creatinine W/GFR Point of Care    POCT urea (BUN)    Lactic Acid, POC    POCT Glucose    POC Blood Gas and Chemistry    EKG 12 Lead    Restraints non-violent or non-self-destructive       MEDICATIONS ORDERED:  Orders Placed This Encounter   Medications    propofol 1000 MG/100ML injection     Gem Rendon: cabinet override    propofol injection     Order Specific Question:   Titrate Infusion? Answer:   Yes     Order Specific Question:   Initial Infusion Dose:      Answer:   20 mcg/kg/min     Order Specific Question:   Goal of Therapy:     Answer:   RASS of -1 to 0     Order Specific Question:   Contact Provider if:     Answer:   New onset HR less than 50 bpm     Order Specific Question:   Contact Provider if:     Answer:   New onset SBP less than 90 mmHg     Order Specific Question:   Contact Provider if:     Answer:   Patient is receiving maximum dose and is not achieving the goal of therapy     Order Specific Question:   Contact Provider if:     Answer:   Triglycerides greater than 500 mg/dL    fentaNYL 50 mcg/mL 50 mL infusion     Order Specific Question:   Titrate Infusion? Answer:   Yes     Order Specific Question:   Initial Infusion Dose: Answer:   48 mcg/hr     Order Specific Question:   Goal of Therapy is: Answer:   RASS of -1 to 1     Order Specific Question:   Contact Provider if:     Answer:   Patient is receiving the maximum dose and is not achieving the goal of therapy    fentaNYL (SUBLIMAZE) 100 MCG/2ML injection     Gem Rendon: cabinet override    acetaminophen (TYLENOL) suppository 650 mg    vecuronium (NORCURON) injection 10 mg    chlorhexidine (PERIDEX) 0.12 % solution 15 mL    famotidine (PEPCID) injection 20 mg    midazolam PF (VERSED) injection 2 mg    0.9 % sodium chloride bolus           DIAGNOSTIC RESULTS / EMERGENCY DEPARTMENT COURSE / MDM     LABS:  Results for orders placed or performed during the hospital encounter of 10/03/22   Culture, Blood 1    Specimen: Blood   Result Value Ref Range    Specimen Description . BLOOD     Culture NO GROWTH <24 HRS    ELECTROLYTES PLUS   Result Value Ref Range    POC Sodium 140 138 - 146 mmol/L    POC Potassium 4.3 3.5 - 4.5 mmol/L    POC Chloride 107 98 - 107 mmol/L    POC TCO2 25 22 - 30 mmol/L    Anion Gap 9 7 - 16 mmol/L   Hemoglobin and hematocrit, blood   Result Value Ref Range    POC Hemoglobin 17.1 (H) 12.0 - 16.0 g/dL    POC Hematocrit 50 (H) 36 - 46 %   CALCIUM, IONIC (POC)   Result Value Ref Range    POC Ionized Calcium 0.98 (L) 1.15 - 1.33 mmol/L   Ammonia   Result Value Ref Range    Ammonia 36 11 - 51 umol/L   CBC with Auto Differential   Result Value Ref Range    WBC 10.2 3.5 - 11.3 k/uL    RBC 4.88 3.95 - 5.11 m/uL    Hemoglobin 14.2 11.9 - 15.1 g/dL    Hematocrit 44.5 36.3 - 47.1 %    MCV 91.2 82.6 - 102.9 fL    MCH 29.1 25.2 - 33.5 pg    MCHC 31.9 28.4 - 34.8 g/dL    RDW 17.1 (H) 11.8 - 14.4 %    Platelets 077 692 - 565 k/uL    MPV 9.4 8.1 - 13.5 fL    NRBC Automated 0.0 0.0 per 100 WBC    Immature Granulocytes 0 0 %    Seg Neutrophils 96 (H) 36 - 66 %    Lymphocytes 4 (L) 24 - 44 %    Monocytes 0 (L) 1 - 7 %    Eosinophils % 0 (L) 1 - 4 %    Basophils 0 0 - 2 %    Absolute Immature Granulocyte 0.00 0.00 - 0.30 k/uL    Segs Absolute 9.79 (H) 1.8 - 7.7 k/uL    Absolute Lymph # 0.41 (L) 1.0 - 4.8 k/uL    Absolute Mono # 0.00 (L) 0.1 - 0.8 k/uL    Absolute Eos # 0.00 0.0 - 0.4 k/uL    Basophils Absolute 0.00 0.0 - 0.2 k/uL    Morphology ANISOCYTOSIS PRESENT    Lactate, Sepsis   Result Value Ref Range    Lactic Acid, Sepsis, Whole Blood 2.5 (H) 0.5 - 1.9 mmol/L   Comprehensive Metabolic Panel   Result Value Ref Range    Glucose 158 (H) 70 - 99 mg/dL    BUN 8 6 - 20 mg/dL    Creatinine 0.42 (L) 0.50 - 0.90 mg/dL    Calcium 9.0 8.6 - 10.4 mg/dL    Sodium 140 135 - 144 mmol/L    Potassium 4.2 3.7 - 5.3 mmol/L    Chloride 100 98 - 107 mmol/L    CO2 26 20 - 31 mmol/L    Anion Gap 14 9 - 17 mmol/L    Alkaline Phosphatase 98 35 - 104 U/L    ALT 9 5 - 33 U/L    AST PENDING U/L    Total Bilirubin 0.3 0.3 - 1.2 mg/dL    Total Protein 7.0 6.4 - 8.3 g/dL    Albumin 4.3 3.5 - 5.2 g/dL    Albumin/Globulin Ratio 1.6 1.0 - 2.5    GFR Non-African American >60 >60 mL/min    GFR African American >60 >60 mL/min    GFR Comment         C-Reactive Protein   Result Value Ref Range    CRP 21.4 (H) 0.0 - 5.0 mg/L   Magnesium   Result Value Ref Range    Magnesium 1.5 (L) 1.6 - 2.6 mg/dL   Venous Blood Gas, POC   Result Value Ref Range    pH, Steffen 7.476 (H) 7.320 - 7.430    pCO2, Steffen 34.1 (L) 41.0 - 51.0 mm Hg    pO2, Steffen 41.8 30.0 - 50.0 mm Hg    HCO3, Venous 25.2 22.0 - 29.0 mmol/L    Positive Base Excess, Steffen 2 0.0 - 3.0    O2 Sat, Steffen 81 60.0 - 85.0 %   Creatinine W/GFR Point of Care   Result Value Ref Range    POC Creatinine 0.38 (L) 0.51 - 1.19 mg/dL    GFR Comment >60 >60 mL/min    GFR Non-African American >60 >60 mL/min    GFR Comment          eGFR, POC >60 mL/min/1.73m2   POCT urea (BUN)   Result Value Ref Range    POC BUN 8 8 - 26 mg/dL   Lactic Acid, POC   Result Value Ref Range    POC Lactic Acid 1.56 (H) 0.56 - 1.39 mmol/L   POCT Glucose   Result Value Ref Range    POC Glucose 155 (H) 74 - 100 mg/dL         IMPRESSION/MDM/ED COURSE:  59 y.o. female presented with altered mental status. Patient was also found to be febrile, is SIRS positive. Full sepsis work-up as well as altered mental status. Given patient's intubation status, will consult critical care for admission purposes. If there happens to be a brain bleed, will remove critical care consultation and consult neuro critical care. ED Course as of 10/03/22 1847   Mon Oct 03, 2022   1623 EKG 12 Lead [MZ]      ED Course User Index  [MZ] Ki Gtz MD       Sepsis Times and Checklist  Vital Signs: BP: (!) 179/87  Heart Rate: (!) 109  Resp: 16  Temp: (!) 100.6 °F (38.1 °C) SpO2: 100 %  SIRS (>2) Non Pregnant       Temp > 38.3C or < 36C   HR > 90   RR > 20   WBC > 12 or < 4 or >10% bands  SIRS (>2) and confirmed or suspected source of infection = Sepsis  Is Sepsis due to likely bacterial infection?: Yes      Sepsis Identified:  Date: 10/3/48316   Time: 1403  Sepsis Orders:   CBC(required): Yes   CMP: Yes   PT/PTT: No   Blood Cultures x2(required): Yes   Urinalysis and Urine Culture: Yes   Lactate(required): Yes   Antibiotics Given (within 3 hours of sepsis identification, after blood cultures):  Yes    (If unable to obtain IV access for IV antibiotics within 3 hours of identification of sepsis, IM antibiotics is acceptable.)              If lactate >2.0 MUST repeat within 6 hours    If elevated, is elevated lactate from a likely infectious source?: Yes.       IV Fluid Bolus:  Is lactate > 4.0:           Fluids must be initiated within 3 hours of sepsis identification. These fluids must have a rate > 125 ml/hr. IV Fluids given prior to arrival can be used in this calculation but the following information must be documented:  Start time: 1340, Type of fluid NS, Volume of fluid 2000, and Rate (Duration or End time) 999ml/hr. Does the patient or patient advocate refuse the entire 30 ml/kg IV fluid bolus? No    RADIOLOGY:  XR CHEST PORTABLE    (Results Pending)   CT HEAD WO CONTRAST    (Results Pending)   CTA HEAD NECK W CONTRAST    (Results Pending)   CT ABDOMEN PELVIS W IV CONTRAST Additional Contrast? None    (Results Pending)         EKG  Sinus tachycardia  Right superior axis deviation  No acute ST elevation  No T wave inversions or depressions  Nonspecific T wave changes  Abnormal EKG     all EKG's are interpreted by the Emergency Department Physician who either signs or Co-signs this chart in the absence of a cardiologist.      PROCEDURES:  PROCEDURE NOTE - EMERGENCY INTUBATION    PATIENT NAME: [facility]  MEDICAL RECORD NO. [id number]  DATE: 10/3/2022  ATTENDING PHYSICIAN: Evita    PREOPERATIVE DIAGNOSIS:  Acute Respiratory Failure  POSTOPERATIVE DIAGNOSIS:  Same  PROCEDURE PERFORMED:  Emergency endotracheal intubation  PERFORMING PHYSICIAN: Kristen Kennedy MD    MEDICATIONS: etomidate intravenously and succinycholine intravenously    DISCUSSION:  Blanca Ennis is a 59y.o.-year-old female who requires intubation and ventilatory support due to airway protection. The history and physical examination were reviewed and confirmed. CONSENT: Unable to be obtained due to patient's condition. PROCEDURE:  A timeout was initiated by the bedside nurse and was confirmed by those present. The patient was placed in the appropriate position. Cricoid pressure was not required. Intubation was performed by direct laryngoscopy using a laryngoscope and a 7.5 cuffed endotracheal tube.   The cuff was then inflated and the tube was secured appropriately at a distance of 25 cm to the dental ridge. Initial confirmation of placement included bilateral breath sounds, an end tidal CO2 detector, absence of sounds over the stomach, tube fogging, adequate chest rise, adequate pulse oximetry reading, and improved skin color. A chest x-ray to verify correct placement of the tube has been ordered but is still pending. The patient tolerated the procedure well. COMPLICATIONS:  none       Flora Gandhi MD  2:44 PM, 10/3/22      CONSULTS:  IP CONSULT TO CRITICAL CARE  IP CONSULT TO DIETITIAN        FINAL IMPRESSION      1. Altered mental status, unspecified altered mental status type    2. Septicemia ([facility] [address]          DISPOSITION / PLAN       DISPOSITION Decision To Admit 10/03/2022 01:43:29 PM        PATIENT REFERREDTO:  No follow-up provider specified.     DISCHARGE MEDICATIONS:  New Prescriptions    No medications on file       Flora Gandhi MD  PGY 3  Resident Physician Emergency Medicine  10/03/22 2:44 PM        (Please note that portions of this note were completed with a voice recognition program.Efforts were made to edit the dictations but occasionally words are mis-transcribed.)        Flora Gandhi MD  Resident  10/03/22 5213

## 2022-10-03 NOTE — ED PROVIDER NOTES
Critical Care - History and Physical Examination    Patient's name:  Alison Adkins  Medical Record Number: [id number]  Patient's account/billing number: [de-identified]  Patient's YOB: 1965  Age: 59 y.o. Date of Admission: 10/3/2022  1:07 PM  Date of History and Physical Examination: 10/3/2022      Primary Care Physician: Bolivar Castillo DO  Attending Physician: Moreno Adam    Code Status: Prior    Chief complaint:   Chief Complaint   Patient presents with    Altered Mental Status         HISTORY OF PRESENT ILLNESS:      History was obtained from chart review and unobtainable from patient due to intubation. Alison Adkins is a 59 y.o. with PMH of   - ovarian cancer with metastasis intraabdominally, to the spleen, and lungs  - cervical cancer  - Anemia  - Bleeding  - Diabetes  - GERD  - Depression    Presented to ER with altered mental status by her daughter who found her and believed that she was just tired. Upon recheck, she was found to be significantly worse and was brought in. BG was taken and found to be low. GCS was also low and though patient was protecting airway, the low score with + SIRS lead to intubation. No other history was obtained by the emergency medicine team.    Symptoms started suddenly    Associated with Metastatic Cancer    Upon admission, pt was AMS, hypertensive, tachycardic and febrile. Admission VBG showed pH of 7.476 , PCO2 of 34.1, HCO3 of 25.2 and Lactic of 1.56      1510 Glucose, Random(!): 158 [MZ]   1511 Creatinine(!): 0.42 [MZ]   1511 POC Glucose(!): 155 [MZ]   1511 POC Lactic Acid(!): 1.56 [MZ]   1511 POC Ionized Calcium(!): 0.98 [MZ]   1511 POC Creatinine(!): 0.38 [MZ]   1511 Hemoglobin and hematocrit, blood(!):    POC Hemoglobin 17. 1(!)   POC Hematocrit 50(!) [MZ]   1511 pH, Steffen(!): 7.476 [MZ]   1511 pCO2, Steffen(!): 34.1 [MZ]   1511 Magnesium(!): 1.5 [MZ]   1512 CRP(!): 21.4 [MZ]         Xray showed   IMPRESSION:  1.  Endotracheal tube tip lies 5 cm above the alejandro. 2. Enteric tube in the stomach with the tip at the level of the proximal to  mid gastric body. 3. Stable right mid I a frame elevation resulting in right hemithorax volume  loss. 4. There is progressive curvilinear right lung base opacity which may  represent progressive atelectasis. 5. Stable perihilar interstitial reticulonodular densities. No acute  consolidation or pulmonary edema. Pt was given propofol   1000 mg per 100 mL injection  Fentanyl 50 mcg/mL 50 mL infusion  Fentanyl 100 mcg over 2 mL injection  Acetaminophen suppository 650 mg  Vecuronium injection 10 mg  Chlorhexidine 0.12% solution 15 mL  Famotidine injection 20 mg  Midazolam PF injection 2 mg  0.9% sodium chloride bolus      PAST MEDICAL HISTORY:         Diagnosis Date    Anemia     Bleeding 10/2020    intra-abdominal bleeding -due to splenic mass with GI infiltration. Status post embolization    Cervical cancer (HCC)     Depression     Diabetes mellitus ([facility] [address]     GERD (gastroesophageal reflux disease)     Hx of blood clots     Hypertension     Metastatic cancer ([facility] 10/2020    extensive intraabdominal and splenic involvement and lung mets. Ovarian cancer ([facility] [address]     low grade serous ovarian carcinoma    Post chemo evaluation     2007: Chemo via med onc (Dr. Talia Rueda), 2008: Kristene Becerril due to rising CA-125, 2013: intraperitoneal chemo,12/2015: Ca125 - 25     Splenic lesion          PAST SURGICAL HISTORY:         Procedure Laterality Date    ABSCESS DRAINAGE  2013    Franca rectal    ANUS SURGERY      ANAL FISSURECTOMY    CARDIAC CATHETERIZATION      COLECTOMY  03/2013    ex lap, tumor debulking, transverse colectomy w reanastamosis, subgastric omentectomy, intraperitoneal port placement    HYSTERECTOMY, TOTAL ABDOMINAL (CERVIX REMOVED)      IR EMBOLIZATION HEMORRHAGE  10/05/2020    intra-abdominal bleeding -due to splenic mass with GI infiltration. Status post embolization [employer] interlock coils x7. mri condtional 3t ok, safe immediately post implant. IR PORT PLACEMENT EQUAL OR GREATER THAN 5 YEARS  08/24/2020    IR PORT PLACEMENT EQUAL OR GREATER THAN 5 YEARS 8/24/2020 Rajani Seay MD [facility] SPECIAL PROCEDURES    PORT SURGERY      IP Port    TONSILLECTOMY         ALLERGIES:      Allergies   Allergen Reactions    Carboplatin Anaphylaxis    Ceftriaxone      Had a rash on ceftriaxone December 2020, [address] MEDS: :      Prior to Admission medications    Medication Sig Start Date End Date Taking? Authorizing Provider   morphine (MS CONTIN) 15 MG extended release tablet TAKE 1 TABLET BY MOUTH 2 TIMES DAILY FOR 30 DAYS. 9/28/22 10/28/22  Damion Thorne MD   lamoTRIgine (LAMICTAL) 25 MG tablet Take 5 tablets by mouth 2 times daily 9/22/22   Shanda Medhkour, DO   LORazepam (ATIVAN) 1 MG tablet TAKE 1 TABLET BY MOUTH EVERY 8 HOURS AS NEEDED FOR ANXIETY FOR UP TO 30 DAYS. 9/21/22 10/21/22  Damion Thorne MD   morphine (MS CONTIN) 30 MG extended release tablet TAKE 1 TABLET BY MOUTH 2 TIMES DAILY FOR 30 DAYS. 9/21/22 10/21/22  Damion Thorne MD   sertraline (ZOLOFT) 100 MG tablet TAKE 1 TABLET BY MOUTH DAILY 9/21/22 10/21/22  Damion Thorne MD   oxyCODONE-acetaminophen (PERCOCET) 5-325 MG per tablet Take 1 tablet by mouth every 4 hours as needed for Pain for up to 30 days.  9/13/22 10/13/22  Antonio Bhatia MD   FEROSUL 325 (65 Fe) MG tablet TAKE 1 TABLET BY MOUTH ONCE DAILY WITH BREAKFAST 9/9/22   Antonio Bhatia MD   levETIRAcetam (KEPPRA) 1000 MG tablet TAKE 1 TABLET (1,000 MG TOTAL) BY MOUTH IN THE MORNING AND 1 TABLET (1,000 MG TOTAL) BEFORE BEDTIME. 9/9/22   Antonio Bhatia MD   FEROSUL 325 (65 Fe) MG tablet TAKE 1 TABLET (325 MG TOTAL) BY MOUTH DAILY WITH BREAKFAST. 9/8/22   Shanda Medhkour, DO   pantoprazole (PROTONIX) 40 MG tablet Take 1 tablet by mouth daily 9/1/22   Antonio Bhatia MD   Lactobacillus (ACIDOPHILUS) CAPS capsule TAKE 1 TABLET BY MOUTH ONCE DAILY IN THE MORNING AND 1 TABLET BEFORE BEDTIME 8/31/22   Shanda Medhkour, DO   furosemide (LASIX) 20 MG tablet TAKE 1 TABLET (20 MG TOTAL) BY MOUTH DAILY. 8/29/22   Shanda Medhkour, DO   dicyclomine (BENTYL) 20 MG tablet TAKE 1 TABLET (20 MG TOTAL) BY MOUTH IN THE MORNING AND 1 TABLET (20 MG TOTAL) BEFORE BEDTIME. 8/29/22   Shanda Medhkour, DO   potassium chloride (MICRO-K) 10 MEQ extended release capsule TAKE 1 CAPSULE (10 MEQ TOTAL) BY MOUTH IN THE MORNING. 8/29/22   Shanda Medhkour, DO   Handicap Placard MISC 5 years 7/19/22   Monroe Cristian Medhkour, DO   loperamide (IMODIUM) 2 MG capsule Take 2 mg by mouth 4 times daily as needed for Diarrhea    Historical Provider, MD   ondansetron (ZOFRAN-ODT) 4 MG disintegrating tablet Take 1 tablet by mouth every 8 hours as needed for Nausea or Vomiting 5/13/22   Maria Luisa Vanegas MD   senna (SENOKOT) 8.6 MG tablet Take 1 tablet by mouth 2 times daily 5/2/22 5/2/23  Sridhar De Leon MD   melatonin 5 MG TABS tablet Take 1 tablet by mouth daily 4/13/22   Shanda Medhkour, DO   benzonatate (TESSALON PERLES) 100 MG capsule Take 1 capsule by mouth 3 times daily as needed for Cough 4/13/22   Shanda Medhkour, DO   apixaban (ELIQUIS) 5 MG TABS tablet Take 1 tablet by mouth 2 times daily 4/8/22   Maria Luisa Vanegas MD   hydrocortisone (ANUSOL-HC) 2.5 % CREA rectal cream Apply on affected area twice daily 2/28/22   Mega Lopez MD       SOCIAL HISTORY:       TOBACCO:   reports that she has been smoking cigarettes. She has a 20.00 pack-year smoking history. She has never used smokeless tobacco.  ETOH:   reports that she does not currently use alcohol. DRUGS:  reports no history of drug use.   OCCUPATION:  n/a     FAMILY HISTORY:          Problem Relation Age of Onset    Alcohol Abuse Mother     Cirrhosis Mother        REVIEW OF SYSTEMS (ROS):     Review of Systems -   A full review of systems was unable to be completed due to the status of the patient; intubated, sedated, unable to respond. OBJECTIVE:     VITAL SIGNS:  BP (!) 191/103   Pulse (!) 113   Temp (!) 100.6 °F (38.1 °C) (Oral)   Resp 16   Ht 5' 5\" (1.651 m)   Wt 135 lb (61.2 kg)   SpO2 98%   BMI 22.47 kg/m²   Tmax over 24 hours:  Temp (24hrs), Av.6 °F (38.1 °C), Min:100.6 °F (38.1 °C), Max:100.6 °F (38.1 °C)      Patient Vitals for the past 8 hrs:   BP Temp Temp src Pulse Resp SpO2 Height Weight   10/03/22 1623 -- -- -- (!) 113 16 98 % -- --   10/03/22 1617 (!) 191/103 -- -- (!) 127 16 -- -- --   10/03/22 1546 (!) 179/87 -- -- (!) 109 16 100 % -- --   10/03/22 1545 -- -- -- (!) 109 18 100 % -- --   10/03/22 1544 (!) 180/100 -- -- (!) 111 14 100 % -- --   10/03/22 1516 -- -- -- (!) 105 24 99 % -- --   10/03/22 1502 (!) 172/84 -- -- (!) 124 28 91 % -- --   10/03/22 1458 -- -- -- (!) 109 23 95 % -- --   10/03/22 1457 (!) 218/185 -- -- (!) 124 20 (!) 85 % -- --   10/03/22 1447 (!) 202/84 -- -- (!) 131 17 93 % -- --   10/03/22 1424 -- -- -- (!) 134 17 96 % 5' 5\" (1.651 m) --   10/03/22 1343 (!) 192/86 -- -- (!) 120 26 96 % -- --   10/03/22 1332 -- -- -- (!) 124 17 100 % -- --   10/03/22 1325 (!) 150/122 -- -- (!) 104 23 99 % -- --   10/03/22 1323 -- -- -- (!) 103 19 99 % -- --   10/03/22 1318 (!) 150/122 -- -- (!) 109 21 98 % -- --   10/03/22 1311 (!) 141/112 (!) 100.6 °F (38.1 °C) Oral (!) 101 16 100 % 5' 5\" (1.651 m) 135 lb (61.2 kg)       No intake or output data in the 24 hours ending 10/03/22 1634    Wt Readings from Last 3 Encounters:   10/03/22 135 lb (61.2 kg)   22 147 lb (66.7 kg)   22 151 lb 6.4 oz (68.7 kg)     Body mass index is 22.47 kg/m². PHYSICAL EXAM:  Physical Exam  Vitals and nursing note reviewed. Constitutional:       Appearance: She is normal weight. She is ill-appearing. Interventions: She is sedated, intubated and restrained. HENT:      Head: Normocephalic. Hair is abnormal (thinning). Mouth/Throat:      Mouth: Mucous membranes are dry. No oral lesions.    Eyes: Pupils: Pupils are equal, round, and reactive to light. Cardiovascular:      Rate and Rhythm: Tachycardia present. Heart sounds: Normal heart sounds. No murmur heard. No friction rub. No gallop. Pulmonary:      Effort: She is intubated. Abdominal:      General: Abdomen is flat. A surgical scar is present. Bowel sounds are normal.      Palpations: Abdomen is soft. There is no hepatomegaly, splenomegaly, mass or pulsatile mass. Hernia: No hernia is present. Musculoskeletal:         General: No swelling. Normal range of motion. Skin:     General: Skin is warm. Capillary Refill: Capillary refill takes less than 2 seconds. Neurological:      Mental Status: She is unresponsive. GCS: GCS eye subscore is 2. GCS verbal subscore is 1. GCS motor subscore is 3.         MEDICATIONS:  Scheduled Meds:   propofol        chlorhexidine  15 mL Mouth/Throat BID    famotidine (PEPCID) injection  20 mg IntraVENous BID    vancomycin  1,000 mg IntraVENous Once     Continuous Infusions:   propofol 50.381 mcg/kg/min (10/03/22 1342)    fentaNYL 50 mcg/mL 100 mcg/hr (10/03/22 1435)    dexmedetomidine       PRN Meds:            ABGs:   Lab Results   Component Value Date/Time    WSA2ESR NOT REPORTED 06/19/2021 03:25 AM    FIO2 30.0 06/19/2021 03:25 AM       DATA:  Complete Blood Count:   Recent Labs     10/03/22  1339   WBC 10.2   RBC 4.88   HGB 14.2   HCT 44.5   MCV 91.2   MCH 29.1   MCHC 31.9   RDW 17.1*      MPV 9.4        Last 3 Blood Glucose:   Recent Labs     10/03/22  1341   GLUCOSE 158*        PT/INR:    Lab Results   Component Value Date/Time    PROTIME 12.2 05/03/2022 05:28 PM    INR 1.2 05/03/2022 05:28 PM     PTT:    Lab Results   Component Value Date/Time    APTT 19.5 05/03/2022 05:28 PM       Comprehensive Metabolic Profile:   Recent Labs     10/03/22  1314 10/03/22  1341   NA  --  140   K  --  4.2   CL  --  100   CO2  --  26   BUN  --  8   CREATININE 0.38* 0.42*   GLUCOSE  --  158* CALCIUM  --  9.0   PROT  --  7.0   LABALBU  --  4.3   BILITOT  --  0.3   ALKPHOS  --  98   AST  --  18   ALT  --  9      Magnesium:   Lab Results   Component Value Date/Time    MG 1.5 10/03/2022 01:41 PM    MG 1.6 05/03/2022 05:50 PM    MG 1.8 02/25/2022 06:10 AM     Phosphorus:   Lab Results   Component Value Date/Time    PHOS 3.6 06/22/2021 04:14 AM    PHOS 2.7 06/21/2021 04:49 AM    PHOS 2.8 06/20/2021 03:53 AM     Ionized Calcium:   Lab Results   Component Value Date/Time    CAION 1.07 06/19/2021 12:00 AM    CAION 1.17 01/14/2021 05:08 AM    CAION 1.14 01/13/2021 06:00 AM        Urinalysis:   Lab Results   Component Value Date/Time    NITRU NEGATIVE 10/03/2022 02:38 PM    COLORU Yellow 10/03/2022 02:38 PM    PHUR 6.5 10/03/2022 02:38 PM    WBCUA None 10/03/2022 02:38 PM    RBCUA 2 TO 5 10/03/2022 02:38 PM    MUCUS NOT REPORTED 02/20/2022 10:40 AM    TRICHOMONAS NOT REPORTED 02/20/2022 10:40 AM    YEAST NOT REPORTED 02/20/2022 10:40 AM    BACTERIA NOT REPORTED 02/20/2022 10:40 AM    SPECGRAV 1.018 10/03/2022 02:38 PM    LEUKOCYTESUR NEGATIVE 10/03/2022 02:38 PM    UROBILINOGEN Normal 10/03/2022 02:38 PM    BILIRUBINUR NEGATIVE 10/03/2022 02:38 PM    GLUCOSEU 1+ 10/03/2022 02:38 PM    KETUA SMALL 10/03/2022 02:38 PM    AMORPHOUS NOT REPORTED 02/20/2022 10:40 AM       HgBA1c:    Lab Results   Component Value Date/Time    LABA1C 5.2 04/13/2022 01:10 PM     TSH:    Lab Results   Component Value Date/Time    TSH 2.93 03/09/2021 05:43 PM       Lactic Acid:   Lab Results   Component Value Date/Time    LACTA NOT REPORTED 06/20/2021 09:39 AM    LACTA NOT REPORTED 06/20/2021 03:53 AM    LACTA NOT REPORTED 06/19/2021 11:52 PM      Troponin: No results for input(s): TROPONINI in the last 72 hours. Radiological imaging  XR CHEST PORTABLE    Result Date: 10/3/2022  EXAMINATION: ONE XRAY VIEW OF THE CHEST 10/3/2022 1:54 pm COMPARISON: Chest radiograph 06/03/2022.  HISTORY: ORDERING SYSTEM PROVIDED HISTORY: AMS TECHNOLOGIST PROVIDED HISTORY: AMS FINDINGS: Single view provided. Right-sided subcutaneous medical infusion port with catheter tip in the distal superior vena cava. Endotracheal tube overlies tracheal air column with the tip 5 cm above the alejandro. Enteric tube follows the course of the esophagus crossing the GE junction and extending into the stomach with the tip at the level of the proximal to mid gastric body. Stable mediastinal and cardiac silhouettes with aortic atherosclerosis. Stable right hemidiaphragm elevation. Progressive right lung base curvilinear opacity. Stable bilateral perihilar interstitial reticulonodular densities. No lobar consolidation. No effusion or pneumothorax. No free subdiaphragmatic air. Stable embolization coils of the left upper quadrant. Redemonstration of cholelithiasis. 1. Endotracheal tube tip lies 5 cm above the alejandro. 2. Enteric tube in the stomach with the tip at the level of the proximal to mid gastric body. 3. Stable right mid I a frame elevation resulting in right hemithorax volume loss. 4. There is progressive curvilinear right lung base opacity which may represent progressive atelectasis. 5. Stable perihilar interstitial reticulonodular densities. No acute consolidation or pulmonary edema. ASSESSMENT:     Principal Problem:    Septicemia ([address]  Active Problems:    AMS (altered mental status)  Resolved Problems:    * No resolved hospital problems. *      PLAN:     ICU PROPHYLAXIS:  Stress ulcer:  [] PPI Agent  [x] [id number] [] Sucralfate  [] Other:  VTE:   [] Enoxaparin  [x] Unfract.  Heparin Subcut  [] EPC Cuffs    NUTRITION:  [x] NPO  [] Tube Feeding (Specify: will move to tube feed after read of CT) [] TPN  [] PO    CONSULTATION NEEDED:  [] No   [x] Yes     HOME MEDICATIONS RECONCILED: [x] No  [] Yes    FAMILY UPDATED:    [x] No   [] Yes     ADDITIONAL PLAN:  Admission to the ICU  Patient to remain intubated until improved  Discussion with family will need to be had as well as consult with palliative care    Neuro:  Continue to keep patient sedated while intubated  Sedation: propofol  Pain control: Tylenol, propofol, fentanyl    CV:  Telemetry  BP and HR elevated      Heme:  CBC daily    Resp:  RR and SpO2   Vent settings:  FiO2 initial 40  SPO2 goal 90 to 96%  Assist-control mode  Tidal 8 mg/kg ideal body weight  Rate 16/min  PEEP 5    /Fluids/Electrolytes:  Daily BMP    GI/Nutrition:  NPO moving to tube feed diet  Bowel regimen: colace, glycolax     ID:  Ceftriaxone + Vancomycin    Endo:  Monitor glucose      Musculoskeletal:  Restrictions: bedridden with passive ROM    Lines/Drains:  Lumen implantable port   placed  06/24/2022  Peripheral IV in right wrist  placed 10/03/2022  Peripheral IV in left forearm  placed 10/03/2022  Urinary catheter   placed 10/03/2022    Prophylaxis:  DVT:  SCD  GI: Famotidine      Martin Pierce MD, MD  ICU resident   [facility] [place] [facility]  10/3/2022 4:34 PM    The critical care team assigned to the patient will be following up the patient in the intensive care unit. I have discussed the current plan with the critical care attending. The above mentioned assessment and plan will be reviewed again in detail by the critical care attending at bedside, and can be further changed or modified accordingly by the attending physician.

## 2022-10-03 NOTE — H&P
Attending Physician Statement  I have discussed the case of Edmundo Esquivel, including pertinent history and exam findings with the resident/fellow/medical student/NP/PA. I have seen and examined the patient and the key elements of the encounter have been performed by me. I agree with the assessment, plan and orders as documented by the resident/fellow/medical student/NP/PA  With changes made to the note as needed. Pt was seen during rounds. Review of Systems:   In addition to the pertinent positives and negatives as stated within HPI and the review of systems as documented in their notes, all other systems were reviewed when able to and are reported negative. Patient admitted with altered mentation. Patient likely has acute toxic metabolic encephalopathy related to opioid use, prescription. With the patient's mentation not improving as expected with Narcan, patient required intubation for airway protection. Patient with known history of metastatic ovarian carcinoma with involvement of the spleen and the lungs along with mediastinal adenopathy. Patient also found to be hypoglycemic. Patient with known history of seizures in the past and is on antiseizure medications. Concern would be for opioid use versus seizures. CT scan of the chest demonstrates bilateral pulmonary infiltrates raising the concern for pneumonia secondary to aspiration with increased risk for Pseudomonas and MRSA-continue mechanical ventilatory support, broad-spectrum antibiotics to cover Pseudomonas and MRSA, follow-up on the culture data and adjust antibiotics accordingly, consult neurology to evaluate for any seizures as a cause for the mentation changes, continue narcotic medications, consult oncology for opinion regarding prognosis    Type 2 diabetes mellitus-monitor blood sugars and cover with insulin    Acute respiratory alkalosis? Cause could be pneumonia-continue to monitor    Hypertension?   Essential or withdrawal from opioids with Narcan use-continue opioid use    Mild lactic acidosis-continue to monitor    Depression-resume home medications    GERD-Pepcid    History of anemia, none currently-continue to monitor    Hypomagnesemia-supplement and continue to monitor and watch for refeeding syndrome    Right basilar atelectasis with elevated hemidiaphragm-continue to monitor. This could also be related to mediastinal adenopathy and phrenic nerve injury. At this point we will continue to monitor    Allergic to carboplatin and ceftriaxone-avoid    Current smoker-recommend cessation    History of seizures with history of status epilepticus in January 2021-resume antiseizure medications and check the levels    Possible sigmoid colon partial obstruction-if evidence of any regurgitation or vomiting, place an NG tube and consult surgery    Patient is on Lovenox for DVT prophylaxis    Overall prognosis appears to be guarded to poor. We will have oncology opinion  Total critical care time caring for this patient with life threatening, unstable organ failure, including direct patient contact, management of life support systems, review of data including imaging and labs, discussions with other team members and physicians at least 27  Min so far today, excluding procedures.           Moreno Adam MD  10/3/2022  4:16 PM

## 2022-10-03 NOTE — ED NOTES
Assumed care of patient from Roberta Florez, patient taken to CT with this RN and RT. Patient is moving her head, arms and legs in CT, Dr George Patel aware, orders received, patient medicated.        Kristina Diaz, RN  99/42/88 [id number] [place], RN  88/56/51 [id number]

## 2022-10-03 NOTE — ED NOTES
Per Dr Severino Mullins and Dr Lyly Castorena pt AMS unable to maintain patent status     Tahmina Ching RN  10/03/22 [id number]

## 2022-10-03 NOTE — PROGRESS NOTES
[facility] Pharmacokinetic Monitoring Service - Vancomycin     Iliana Méndez is a 59 y.o. female starting on vancomycin therapy for sepsis. Pharmacy consulted by Vivian Charles for monitoring and adjustment. Target Concentration: Goal AUC/TAQUERIA 400-600 mg*hr/L    Additional Antimicrobials: zosyn    Pertinent Laboratory Values: Wt Readings from Last 1 Encounters:   10/03/22 135 lb (61.2 kg)     Temp Readings from Last 1 Encounters:   10/03/22 (!) 100.6 °F (38.1 °C) (Oral)     Estimated Creatinine Clearance: 130 mL/min (A) (based on SCr of 0.42 mg/dL (L)). Recent Labs     10/03/22  1314 10/03/22  1339 10/03/22  1341   CREATININE 0.38*  --  0.42*   WBC  --  10.2  --      Procalcitonin:     Pertinent Cultures:  Culture Date Source Results        MRSA Nasal Swab: N/A. Non-respiratory infection.     Plan:  Dosing recommendations based on Bayesian software  Start vancomycin 750mg q8h  Anticipated AUC of 443 and trough concentration of 13.5 at steady state  Renal labs as indicated   Pharmacy will continue to monitor patient and adjust therapy as indicated    Thank you for the consult,  KANDACE Carter [facility]  10/3/2022 6:33 PM

## 2022-10-03 NOTE — ED NOTES
Dr Ryan Esteves at bedside with Dr Anna Pérez. RT, Patrick at bedside.         Yecenia Obrien RN  10/03/22 [id number]

## 2022-10-03 NOTE — ED NOTES
Etomidate 20 mg IVP given per Justin RN, followed by SUCS 80 mg IVP.       Orlando Pinon RN  10/03/22 1793

## 2022-10-04 ENCOUNTER — APPOINTMENT (OUTPATIENT)
Dept: MRI IMAGING | Age: 57
DRG: 871 | End: 2022-10-04
Payer: COMMERCIAL

## 2022-10-04 LAB
ABSOLUTE EOS #: 0.08 K/UL (ref 0–0.44)
ABSOLUTE IMMATURE GRANULOCYTE: 0.03 K/UL (ref 0–0.3)
ABSOLUTE LYMPH #: 1.22 K/UL (ref 1.1–3.7)
ABSOLUTE MONO #: 0.08 K/UL (ref 0.1–1.2)
ALLEN TEST: NORMAL
ANION GAP SERPL CALCULATED.3IONS-SCNC: 9 MMOL/L (ref 9–17)
BASOPHILS # BLD: 1 % (ref 0–2)
BASOPHILS ABSOLUTE: 0.04 K/UL (ref 0–0.2)
BUN BLDV-MCNC: 6 MG/DL (ref 6–20)
CALCIUM SERPL-MCNC: 7.9 MG/DL (ref 8.6–10.4)
CHLORIDE BLD-SCNC: 104 MMOL/L (ref 98–107)
CO2: 21 MMOL/L (ref 20–31)
CREAT SERPL-MCNC: 0.28 MG/DL (ref 0.5–0.9)
CULTURE: NO GROWTH
EKG ATRIAL RATE: 139 BPM
EKG P AXIS: 84 DEGREES
EKG P-R INTERVAL: 158 MS
EKG Q-T INTERVAL: 264 MS
EKG QRS DURATION: 86 MS
EKG QTC CALCULATION (BAZETT): 401 MS
EKG R AXIS: -127 DEGREES
EKG T AXIS: 86 DEGREES
EKG VENTRICULAR RATE: 139 BPM
EOSINOPHILS RELATIVE PERCENT: 1 % (ref 1–4)
FIO2: 40
GFR SERPL CREATININE-BSD FRML MDRD: >60 ML/MIN/1.73M2
GLUCOSE BLD-MCNC: 123 MG/DL (ref 70–99)
GLUCOSE BLD-MCNC: 141 MG/DL (ref 74–100)
HCT VFR BLD CALC: 36.9 % (ref 36.3–47.1)
HEMOGLOBIN: 11.5 G/DL (ref 11.9–15.1)
IMMATURE GRANULOCYTES: 0 %
LAMOTRIGINE LEVEL: <1 UG/ML (ref 3–15)
LYMPHOCYTES # BLD: 18 % (ref 24–43)
MCH RBC QN AUTO: 29 PG (ref 25.2–33.5)
MCHC RBC AUTO-ENTMCNC: 31.2 G/DL (ref 28.4–34.8)
MCV RBC AUTO: 92.9 FL (ref 82.6–102.9)
MODE: NORMAL
MONOCYTES # BLD: 1 % (ref 3–12)
MRSA, DNA, NASAL: NEGATIVE
NRBC AUTOMATED: 0 PER 100 WBC
O2 DEVICE/FLOW/%: NORMAL
PDW BLD-RTO: 17.2 % (ref 11.8–14.4)
PLATELET # BLD: 174 K/UL (ref 138–453)
PMV BLD AUTO: 9.9 FL (ref 8.1–13.5)
POC HCO3: 25.8 MMOL/L (ref 21–28)
POC O2 SATURATION: 98 % (ref 94–98)
POC PCO2: 40.9 MM HG (ref 35–48)
POC PH: 7.41 (ref 7.35–7.45)
POC PO2: 104 MM HG (ref 83–108)
POSITIVE BASE EXCESS, ART: 1 (ref 0–3)
POTASSIUM SERPL-SCNC: 3.6 MMOL/L (ref 3.7–5.3)
RBC # BLD: 3.97 M/UL (ref 3.95–5.11)
RBC # BLD: ABNORMAL 10*6/UL
SAMPLE SITE: NORMAL
SEG NEUTROPHILS: 79 % (ref 36–65)
SEGMENTED NEUTROPHILS ABSOLUTE COUNT: 5.48 K/UL (ref 1.5–8.1)
SODIUM BLD-SCNC: 134 MMOL/L (ref 135–144)
SPECIMEN DESCRIPTION: NORMAL
SPECIMEN DESCRIPTION: NORMAL
WBC # BLD: 6.9 K/UL (ref 3.5–11.3)

## 2022-10-04 PROCEDURE — 80048 BASIC METABOLIC PNL TOTAL CA: CPT

## 2022-10-04 PROCEDURE — 6360000002 HC RX W HCPCS: Performed by: STUDENT IN AN ORGANIZED HEALTH CARE EDUCATION/TRAINING PROGRAM

## 2022-10-04 PROCEDURE — 36415 COLL VENOUS BLD VENIPUNCTURE: CPT

## 2022-10-04 PROCEDURE — 80175 DRUG SCREEN QUAN LAMOTRIGINE: CPT

## 2022-10-04 PROCEDURE — 2000000000 HC ICU R&B

## 2022-10-04 PROCEDURE — 2700000000 HC OXYGEN THERAPY PER DAY

## 2022-10-04 PROCEDURE — 6370000000 HC RX 637 (ALT 250 FOR IP): Performed by: STUDENT IN AN ORGANIZED HEALTH CARE EDUCATION/TRAINING PROGRAM

## 2022-10-04 PROCEDURE — 99223 1ST HOSP IP/OBS HIGH 75: CPT | Performed by: FAMILY MEDICINE

## 2022-10-04 PROCEDURE — 2500000003 HC RX 250 WO HCPCS: Performed by: STUDENT IN AN ORGANIZED HEALTH CARE EDUCATION/TRAINING PROGRAM

## 2022-10-04 PROCEDURE — 94760 N-INVAS EAR/PLS OXIMETRY 1: CPT

## 2022-10-04 PROCEDURE — 99291 CRITICAL CARE FIRST HOUR: CPT | Performed by: INTERNAL MEDICINE

## 2022-10-04 PROCEDURE — 36600 WITHDRAWAL OF ARTERIAL BLOOD: CPT

## 2022-10-04 PROCEDURE — 6360000002 HC RX W HCPCS

## 2022-10-04 PROCEDURE — 95711 VEEG 2-12 HR UNMONITORED: CPT

## 2022-10-04 PROCEDURE — 82803 BLOOD GASES ANY COMBINATION: CPT

## 2022-10-04 PROCEDURE — 2580000003 HC RX 258: Performed by: STUDENT IN AN ORGANIZED HEALTH CARE EDUCATION/TRAINING PROGRAM

## 2022-10-04 PROCEDURE — 99223 1ST HOSP IP/OBS HIGH 75: CPT | Performed by: PSYCHIATRY & NEUROLOGY

## 2022-10-04 PROCEDURE — 95700 EEG CONT REC W/VID EEG TECH: CPT

## 2022-10-04 PROCEDURE — 94640 AIRWAY INHALATION TREATMENT: CPT

## 2022-10-04 PROCEDURE — 70551 MRI BRAIN STEM W/O DYE: CPT

## 2022-10-04 PROCEDURE — 85025 COMPLETE CBC W/AUTO DIFF WBC: CPT

## 2022-10-04 PROCEDURE — 94003 VENT MGMT INPAT SUBQ DAY: CPT

## 2022-10-04 PROCEDURE — 6370000000 HC RX 637 (ALT 250 FOR IP)

## 2022-10-04 PROCEDURE — 82947 ASSAY GLUCOSE BLOOD QUANT: CPT

## 2022-10-04 RX ORDER — MIDAZOLAM HYDROCHLORIDE 2 MG/2ML
1 INJECTION, SOLUTION INTRAMUSCULAR; INTRAVENOUS
Status: DISCONTINUED | OUTPATIENT
Start: 2022-10-04 | End: 2022-10-08

## 2022-10-04 RX ORDER — LORAZEPAM 2 MG/ML
0.5 INJECTION INTRAMUSCULAR
Status: ACTIVE | OUTPATIENT
Start: 2022-10-04 | End: 2022-10-05

## 2022-10-04 RX ORDER — CHOLECALCIFEROL (VITAMIN D3) 125 MCG
5 CAPSULE ORAL NIGHTLY
Status: DISCONTINUED | OUTPATIENT
Start: 2022-10-04 | End: 2022-10-17 | Stop reason: HOSPADM

## 2022-10-04 RX ORDER — LEVETIRACETAM 500 MG/1
1000 TABLET ORAL EVERY EVENING
Status: DISCONTINUED | OUTPATIENT
Start: 2022-10-04 | End: 2022-10-05

## 2022-10-04 RX ORDER — LEVETIRACETAM 5 MG/ML
500 INJECTION INTRAVASCULAR EVERY 12 HOURS
Status: DISCONTINUED | OUTPATIENT
Start: 2022-10-04 | End: 2022-10-04

## 2022-10-04 RX ORDER — LEVETIRACETAM 10 MG/ML
1000 INJECTION INTRAVASCULAR NIGHTLY
Status: DISCONTINUED | OUTPATIENT
Start: 2022-10-04 | End: 2022-10-04

## 2022-10-04 RX ORDER — OXYCODONE HYDROCHLORIDE AND ACETAMINOPHEN 5; 325 MG/1; MG/1
1 TABLET ORAL EVERY 4 HOURS PRN
Status: DISCONTINUED | OUTPATIENT
Start: 2022-10-04 | End: 2022-10-17 | Stop reason: HOSPADM

## 2022-10-04 RX ORDER — MAGNESIUM SULFATE IN WATER 40 MG/ML
2000 INJECTION, SOLUTION INTRAVENOUS ONCE
Status: COMPLETED | OUTPATIENT
Start: 2022-10-04 | End: 2022-10-04

## 2022-10-04 RX ORDER — FUROSEMIDE 20 MG/1
20 TABLET ORAL DAILY
Status: DISCONTINUED | OUTPATIENT
Start: 2022-10-04 | End: 2022-10-10

## 2022-10-04 RX ORDER — HALOPERIDOL 5 MG/ML
2 INJECTION INTRAMUSCULAR ONCE
Status: COMPLETED | OUTPATIENT
Start: 2022-10-04 | End: 2022-10-04

## 2022-10-04 RX ORDER — CHOLECALCIFEROL (VITAMIN D3) 125 MCG
5 CAPSULE ORAL DAILY
Status: DISCONTINUED | OUTPATIENT
Start: 2022-10-04 | End: 2022-10-04

## 2022-10-04 RX ORDER — SODIUM CHLORIDE FOR INHALATION 0.9 %
3 VIAL, NEBULIZER (ML) INHALATION EVERY 4 HOURS PRN
Status: DISCONTINUED | OUTPATIENT
Start: 2022-10-04 | End: 2022-10-17 | Stop reason: HOSPADM

## 2022-10-04 RX ORDER — MORPHINE SULFATE 2 MG/ML
2 INJECTION, SOLUTION INTRAMUSCULAR; INTRAVENOUS EVERY 4 HOURS PRN
Status: DISCONTINUED | OUTPATIENT
Start: 2022-10-04 | End: 2022-10-17 | Stop reason: HOSPADM

## 2022-10-04 RX ADMIN — SERTRALINE 100 MG: 50 TABLET, FILM COATED ORAL at 13:38

## 2022-10-04 RX ADMIN — PROPOFOL 50 MCG/KG/MIN: 10 INJECTION, EMULSION INTRAVENOUS at 03:50

## 2022-10-04 RX ADMIN — SODIUM CHLORIDE, PRESERVATIVE FREE 20 MG: 5 INJECTION INTRAVENOUS at 20:35

## 2022-10-04 RX ADMIN — ONDANSETRON 4 MG: 2 INJECTION INTRAMUSCULAR; INTRAVENOUS at 19:52

## 2022-10-04 RX ADMIN — Medication 750 MG: at 07:43

## 2022-10-04 RX ADMIN — PIPERACILLIN AND TAZOBACTAM 3375 MG: 3; .375 INJECTION, POWDER, FOR SOLUTION INTRAVENOUS at 20:46

## 2022-10-04 RX ADMIN — MIDAZOLAM 1 MG: 1 INJECTION INTRAMUSCULAR; INTRAVENOUS at 19:47

## 2022-10-04 RX ADMIN — SODIUM CHLORIDE 3000 MG: 9 INJECTION, SOLUTION INTRAVENOUS at 08:53

## 2022-10-04 RX ADMIN — DOCUSATE SODIUM LIQUID 100 MG: 100 LIQUID ORAL at 08:27

## 2022-10-04 RX ADMIN — LAMOTRIGINE 125 MG: 100 TABLET ORAL at 20:30

## 2022-10-04 RX ADMIN — SODIUM CHLORIDE, PRESERVATIVE FREE 20 MG: 5 INJECTION INTRAVENOUS at 08:26

## 2022-10-04 RX ADMIN — ENOXAPARIN SODIUM 40 MG: 100 INJECTION SUBCUTANEOUS at 08:26

## 2022-10-04 RX ADMIN — DEXMEDETOMIDINE HYDROCHLORIDE 0.5 MCG/KG/HR: 4 INJECTION, SOLUTION INTRAVENOUS at 03:28

## 2022-10-04 RX ADMIN — Medication 750 MG: at 00:58

## 2022-10-04 RX ADMIN — Medication 5 MG: at 20:30

## 2022-10-04 RX ADMIN — SODIUM CHLORIDE, PRESERVATIVE FREE 10 ML: 5 INJECTION INTRAVENOUS at 08:27

## 2022-10-04 RX ADMIN — Medication 100 MCG/HR: at 03:33

## 2022-10-04 RX ADMIN — PIPERACILLIN AND TAZOBACTAM 3375 MG: 3; .375 INJECTION, POWDER, FOR SOLUTION INTRAVENOUS at 04:46

## 2022-10-04 RX ADMIN — SODIUM CHLORIDE 25 ML/HR: 9 INJECTION, SOLUTION INTRAVENOUS at 00:05

## 2022-10-04 RX ADMIN — MAGNESIUM SULFATE HEPTAHYDRATE 2000 MG: 40 INJECTION, SOLUTION INTRAVENOUS at 17:02

## 2022-10-04 RX ADMIN — SODIUM CHLORIDE: 9 INJECTION, SOLUTION INTRAVENOUS at 18:37

## 2022-10-04 RX ADMIN — Medication 750 MG: at 23:51

## 2022-10-04 RX ADMIN — LEVETIRACETAM 1000 MG: 500 TABLET, FILM COATED ORAL at 17:59

## 2022-10-04 RX ADMIN — SODIUM CHLORIDE: 9 INJECTION, SOLUTION INTRAVENOUS at 05:32

## 2022-10-04 RX ADMIN — POTASSIUM BICARBONATE 40 MEQ: 782 TABLET, EFFERVESCENT ORAL at 17:06

## 2022-10-04 RX ADMIN — CHLORHEXIDINE GLUCONATE 15 ML: 1.2 SOLUTION ORAL at 08:27

## 2022-10-04 RX ADMIN — MORPHINE SULFATE 2 MG: 2 INJECTION, SOLUTION INTRAMUSCULAR; INTRAVENOUS at 17:56

## 2022-10-04 RX ADMIN — SODIUM CHLORIDE, PRESERVATIVE FREE 10 ML: 5 INJECTION INTRAVENOUS at 20:36

## 2022-10-04 RX ADMIN — HALOPERIDOL LACTATE 2 MG: 5 INJECTION, SOLUTION INTRAMUSCULAR at 15:23

## 2022-10-04 RX ADMIN — FUROSEMIDE 20 MG: 20 TABLET ORAL at 13:38

## 2022-10-04 RX ADMIN — MIDAZOLAM 1 MG: 1 INJECTION INTRAMUSCULAR; INTRAVENOUS at 22:36

## 2022-10-04 RX ADMIN — MAGNESIUM SULFATE HEPTAHYDRATE 2000 MG: 40 INJECTION, SOLUTION INTRAVENOUS at 00:06

## 2022-10-04 RX ADMIN — APIXABAN 5 MG: 5 TABLET, FILM COATED ORAL at 20:33

## 2022-10-04 RX ADMIN — Medication 750 MG: at 16:58

## 2022-10-04 RX ADMIN — LAMOTRIGINE 125 MG: 100 TABLET ORAL at 13:38

## 2022-10-04 RX ADMIN — PIPERACILLIN AND TAZOBACTAM 3375 MG: 3; .375 INJECTION, POWDER, FOR SOLUTION INTRAVENOUS at 12:40

## 2022-10-04 RX ADMIN — ALBUTEROL SULFATE 2.5 MG: 5 SOLUTION RESPIRATORY (INHALATION) at 19:40

## 2022-10-04 ASSESSMENT — PULMONARY FUNCTION TESTS
PIF_VALUE: 18
PIF_VALUE: 18
PIF_VALUE: 12
PIF_VALUE: 16
PIF_VALUE: 18
PIF_VALUE: 19
PIF_VALUE: 12
PIF_VALUE: 21
PIF_VALUE: 17
PIF_VALUE: 18
PIF_VALUE: 12
PIF_VALUE: 12
PIF_VALUE: 18
PIF_VALUE: 16
PIF_VALUE: 17
PIF_VALUE: 12
PIF_VALUE: 11

## 2022-10-04 NOTE — CARE COORDINATION
Consult : triggered on admission, pt with ovarian cancer  Reviewed chart  Pt is presently intubated. SW will continue to follow and complete assessment when pt is extubated and able to participate.

## 2022-10-04 NOTE — CONSULTS
General Surgery  Consult    PATIENT NAME: Tori Cabrera [facility]  AGE: 59 y.o. MEDICAL RECORD NO. [id number]  DATE: 10/3/2022  SURGEON: Dr. Bourne Given: Pascual Montenegro DO    Patient evaluated at the request of  Dr. Alondra Roche  Reason for evaluation: Concern for malignant bowel obstruction    Patient information was obtained from relative(s) and past medical records. History/Exam limitations: Intubated, sedated    IMPRESSION:     59year old female with hx of recurrent metastatic ovarian cancer presented with altered mental status. CT scan done in the ER demonstrated large bowel dilation and possible malignant obstruction in the sigmoid colon vs stricture. CT scan similar in appearance to scan done in 2022 with air and stool distal to the area of concern in both scans. PLAN:   Continue IV fluids, Zosyn and Vanco  Continue medical management per MICU team  Sigmoid stricture is most likely chronic, as seen on CT scan in 2022. There is air and stool present past the area of concern so she is not completely obstructed. No operative intervention planned at this time. We will continue to follow along and do serial abdominal exams. Altered mental status most likely caused by pneumonia rather than abdominal pathology. HISTORY:   History of Chief Complaint:   Jimbo Martinez is a 59 y.o. female with hx of stage IV metastatic ovarian cancer on chemotherapy who presented with altered mental status. Pt has been receiving chemotherapy through her right chest port however has missed the last few sessions due to depression after her son . Pt was intubated in the emergency department due to low GCS. She was originally diagnosed with ovarian cancer in  and has had multiple recurrences. She has had a splenic embolization for bleeding caused by metastatic disease recently. There is also metastatic disease in the lungs and CT scan re-demonstrates known peritoneal carcinomatosis.  She received intra-peritoneal chemo in 2013 through an intra-peritoneal port that is still in place. Pertinent surgical history includes a transverse colectomy with primary anastomosis for tumor debulking in 2013. She has also had a total abdominal hysterectomy as well. She takes Eliquis for history of DVT. Daughter is at bedside and does not know when the altered mental status began. Daughter reports patient has been depressed lately and not moving much, but she was confused and mumbling prior to coming to the ED which is not her norm. Daughter reports pt always appears thin to her and does not appear more distended than usual. She had not been complaining of increased abdominal pain, nausea or vomiting in the last few days, but does have chronic pain. CT scan demonstrates large bowel dilation with stricture vs obstruction in the sigmoid colon. This is similar in appearance to the CT scan done in June 2022. Scan today demonstrates air and stool past the stricture. Imaging of the chest demonstrates left lower lobe consolidation with concern for pneumonia. Past Medical History   has a past medical history of Anemia, Bleeding, Cervical cancer ([address] Depression, Diabetes mellitus ([address] GERD (gastroesophageal reflux disease), Hx of blood clots, Hypertension, Metastatic cancer ([address] Ovarian cancer ([address] Post chemo evaluation, and Splenic lesion. Past Surgical History   has a past surgical history that includes Hysterectomy, total abdominal; Port Surgery; Tonsillectomy; IR PORT PLACEMENT > 5 YEARS (08/24/2020); Anus surgery; Abscess Drainage (2013); colectomy (03/2013); IR EMBOLIZATION HEMORRHAGE (10/05/2020); and Cardiac catheterization. Medications  Prior to Admission medications    Medication Sig Start Date End Date Taking? Authorizing Provider   morphine (MS CONTIN) 15 MG extended release tablet TAKE 1 TABLET BY MOUTH 2 TIMES DAILY FOR 30 DAYS.  9/28/22 10/28/22  Joni Mondragon MD   lamoTRIgine (LAMICTAL) 25 MG tablet Take 5 tablets by mouth 2 times daily 9/22/22   Shandajanie Hinds, DO   LORazepam (ATIVAN) 1 MG tablet TAKE 1 TABLET BY MOUTH EVERY 8 HOURS AS NEEDED FOR ANXIETY FOR UP TO 30 DAYS. 9/21/22 10/21/22  Nestor Bhatia MD   morphine (MS CONTIN) 30 MG extended release tablet TAKE 1 TABLET BY MOUTH 2 TIMES DAILY FOR 30 DAYS. 9/21/22 10/21/22  Nestor Bhatia MD   sertraline (ZOLOFT) 100 MG tablet TAKE 1 TABLET BY MOUTH DAILY 9/21/22 10/21/22  Jass Bales MD   oxyCODONE-acetaminophen (PERCOCET) 5-325 MG per tablet Take 1 tablet by mouth every 4 hours as needed for Pain for up to 30 days. 9/13/22 10/13/22  Nestor Bhatia MD   FEROSUL 325 (65 Fe) MG tablet TAKE 1 TABLET BY MOUTH ONCE DAILY WITH BREAKFAST 9/9/22   Nestor Bhatia MD   levETIRAcetam (KEPPRA) 1000 MG tablet TAKE 1 TABLET (1,000 MG TOTAL) BY MOUTH IN THE MORNING AND 1 TABLET (1,000 MG TOTAL) BEFORE BEDTIME. 9/9/22   Nestor Bhaita MD   FEROSUL 325 (65 Fe) MG tablet TAKE 1 TABLET (325 MG TOTAL) BY MOUTH DAILY WITH BREAKFAST. 9/8/22   Shanda Hinds DO   pantoprazole (PROTONIX) 40 MG tablet Take 1 tablet by mouth daily 9/1/22   Nestor Bhatia MD   Lactobacillus (ACIDOPHILUS) CAPS capsule TAKE 1 TABLET BY MOUTH ONCE DAILY IN THE MORNING AND 1 TABLET BEFORE BEDTIME 8/31/22   Shanda Medevettekour, DO   furosemide (LASIX) 20 MG tablet TAKE 1 TABLET (20 MG TOTAL) BY MOUTH DAILY.  8/29/22   Shanda Medthad, DO   dicyclomine (BENTYL) 20 MG tablet TAKE 1 TABLET (20 MG TOTAL) BY MOUTH IN THE MORNING AND 1 TABLET (20 MG TOTAL) BEFORE BEDTIME. 8/29/22   Shanda Jeremykobreanna, DO   potassium chloride (MICRO-K) 10 MEQ extended release capsule TAKE 1 CAPSULE (10 MEQ TOTAL) BY MOUTH IN THE MORNING. 8/29/22   Shanda Hinds DO   Handicap Placard MISC 5 years 7/19/22   Rohan Hinds DO   loperamide (IMODIUM) 2 MG capsule Take 2 mg by mouth 4 times daily as needed for Diarrhea    Historical Provider, MD   ondansetron (ZOFRAN-ODT) 4 MG disintegrating tablet Take 1 tablet by mouth every 8 hours as needed for Nausea or Vomiting 5/13/22   Jose Babcock MD   senna (SENOKOT) 8.6 MG tablet Take 1 tablet by mouth 2 times daily 5/2/22 5/2/23  Servando Reyes MD   melatonin 5 MG TABS tablet Take 1 tablet by mouth daily 4/13/22   Shanda Medhkour, DO   benzonatate (TESSALON PERLES) 100 MG capsule Take 1 capsule by mouth 3 times daily as needed for Cough 4/13/22   Shanda Medhkour, DO   apixaban (ELIQUIS) 5 MG TABS tablet Take 1 tablet by mouth 2 times daily 4/8/22   Jose Babcock MD   hydrocortisone (ANUSOL-HC) 2.5 % CREA rectal cream Apply on affected area twice daily 2/28/22   Daphnie Hernández MD    Scheduled Meds:   chlorhexidine  15 mL Mouth/Throat BID    sodium chloride flush  5-40 mL IntraVENous 2 times per day    enoxaparin  40 mg SubCUTAneous Daily    bisacodyl  5 mg Oral Daily    famotidine (PEPCID) injection  20 mg IntraVENous BID    propofol        piperacillin-tazobactam  3,375 mg IntraVENous Q8H    vancomycin (VANCOCIN) intermittent dosing (placeholder)   Other RX Placeholder    [START ON 10/4/2022] vancomycin  750 mg IntraVENous Q8H    propofol         Continuous Infusions:   propofol 50.381 mcg/kg/min (10/03/22 1342)    fentaNYL 50 mcg/mL 100 mcg/hr (10/03/22 1435)    dexmedetomidine 0.5 mcg/kg/hr (10/03/22 1820)    sodium chloride      sodium chloride 100 mL/hr at 10/03/22 1900     PRN Meds:.sodium chloride flush, sodium chloride, ondansetron **OR** ondansetron, polyethylene glycol, acetaminophen **OR** acetaminophen, potassium chloride **OR** potassium chloride  Allergies  is allergic to carboplatin and ceftriaxone. Family History  family history includes Alcohol Abuse in her mother; Cirrhosis in her mother. Social History   reports that she has been smoking cigarettes. She has a 20.00 pack-year smoking history. She has never used smokeless tobacco.   reports that she does not currently use alcohol.    reports no history of drug use.  Review of Systems  Unable to obtain due to intubated and sedated    PHYSICAL:   VITALS:  height is 5' 5\" (1.651 m) and weight is 135 lb (61.2 kg). Her oral temperature is 100.6 °F (38.1 °C) (abnormal). Her blood pressure is 109/61 and her pulse is 67. Her respiration is 16 and oxygen saturation is 98%. CONSTITUTIONAL: Intubated and sedated  HEENT: Head is normocephalic, atraumatic. ET tube and OG tube in place  NECK: Soft, trachea midline and straight  LUNGS: Chest expands equally bilaterally upon respiration, no accessory muscle used. Intubated on PRVC  CARDIOVASCULAR: Heart sounds are normal.  Regular rate and rhythm without murmur, gallop or rub. ABDOMEN: soft, non-tender on deep palpation, minimally distended, intraperitoneal port is palpable in midline, Tympanic on percussion  NEUROLOGIC: CN II-XII are grossly intact.  There are no focalizing motor or sensory deficits  EXTREMITIES: no cyanosis, clubbing or edema    LABS:     Recent Labs     10/03/22  1314 10/03/22  1339 10/03/22  1341 10/03/22  1438   WBC  --  10.2  --   --    HGB  --  14.2  --   --    HCT  --  44.5  --   --    PLT  --  202  --   --    NA  --   --  140  --    K  --   --  4.2  --    CL  --   --  100  --    CO2  --   --  26  --    BUN  --   --  8  --    CREATININE 0.38*  --  0.42*  --    MG  --   --  1.5*  --    CALCIUM  --   --  9.0  --    AST  --   --  18  --    ALT  --   --  9  --    BILITOT  --   --  0.3  --    NITRU  --   --   --  NEGATIVE   COLORU  --   --   --  Yellow     Recent Labs     10/03/22  1341   ALKPHOS 98   ALT 9   AST 18   BILITOT 0.3   LABALBU 4.3     CBC with Differential:    Lab Results   Component Value Date/Time    WBC 10.2 10/03/2022 01:39 PM    RBC 4.88 10/03/2022 01:39 PM    HGB 14.2 10/03/2022 01:39 PM    HCT 44.5 10/03/2022 01:39 PM     10/03/2022 01:39 PM    MCV 91.2 10/03/2022 01:39 PM    MCH 29.1 10/03/2022 01:39 PM    MCHC 31.9 10/03/2022 01:39 PM    RDW 17.1 10/03/2022 01:39 PM    NRBC 1 06/05/2022 06:26 AM    METASPCT 3 04/15/2022 11:20 AM    LYMPHOPCT 4 10/03/2022 01:39 PM    MONOPCT 0 10/03/2022 01:39 PM    MYELOPCT 1 04/15/2022 11:20 AM    BASOPCT 0 10/03/2022 01:39 PM    MONOSABS 0.00 10/03/2022 01:39 PM    LYMPHSABS 0.41 10/03/2022 01:39 PM    EOSABS 0.00 10/03/2022 01:39 PM    BASOSABS 0.00 10/03/2022 01:39 PM    DIFFTYPE NOT REPORTED 02/21/2022 06:10 AM     BMP:    Lab Results   Component Value Date/Time     10/03/2022 01:41 PM    K 4.2 10/03/2022 01:41 PM     10/03/2022 01:41 PM    CO2 26 10/03/2022 01:41 PM    BUN 8 10/03/2022 01:41 PM    LABALBU 4.3 10/03/2022 01:41 PM    CREATININE 0.42 10/03/2022 01:41 PM    CREATININE 0.38 10/03/2022 01:14 PM    CALCIUM 9.0 10/03/2022 01:41 PM    GFRAA >60 10/03/2022 01:41 PM    LABGLOM >60 10/03/2022 01:41 PM    GLUCOSE 158 10/03/2022 01:41 PM       RADIOLOGY:     CTA HEAD NECK W CONTRAST   Final Result   1. No acute intracranial abnormality. 2. No evidence for significant stenosis or occlusion. 3. Consolidation in the visualized lungs bilaterally that is worse on the   right compared to the left. Correlate with report from dedicated CT chest,   abdomen, and pelvis for further discussion. CT CHEST ABDOMEN PELVIS W CONTRAST   Final Result   1. Progressive bibasilar and left lower lobe consolidative change which may   represent atelectasis versus superimposed pneumonia. 2. Redemonstration of thoracic and abdominal metastatic disease with mixed   change. Most lesions appear stable with a few interval progression (right   axillary metastatic lymphadenopathy. Retroperitoneal left periaortic   metastatic lymphadenopathy, pelvic presacral cystic mass). 3. Redemonstration of peritoneal carcinomatosis. No significant ascites. 4. Stable blastic skeletal metastasis. 5. Moderate transverse and descending colonic distention with liquid stool.    Abrupt transition in the mid sigmoid colon which may relate to malignant   partial obstruction or stricture. Mild interval improvement of colonic   edema. No evidence of colonic perforation or pneumatosis. CT HEAD WO CONTRAST   Final Result   1. No acute intracranial abnormality. 2. No evidence for significant stenosis or occlusion. 3. Consolidation in the visualized lungs bilaterally that is worse on the   right compared to the left. Correlate with report from dedicated CT chest,   abdomen, and pelvis for further discussion. XR CHEST PORTABLE   Final Result   1. Endotracheal tube tip lies 5 cm above the alejandro. 2. Enteric tube in the stomach with the tip at the level of the proximal to   mid gastric body. 3. Stable right mid I a frame elevation resulting in right hemithorax volume   loss. 4. There is progressive curvilinear right lung base opacity which may   represent progressive atelectasis. 5. Stable perihilar interstitial reticulonodular densities. No acute   consolidation or pulmonary edema.              Evie Pizarro, DO  10/3/2022, 8:23 PM

## 2022-10-04 NOTE — PLAN OF CARE
Problem: Respiratory - Adult  Goal: Achieves optimal ventilation and oxygenation  10/4/2022 1959 by Payton Rodriguez RN  Outcome: Progressing  10/4/2022 0838 by Kaleb Contreras RCP  Flowsheets (Taken 10/4/2022 1026)  Achieves optimal ventilation and oxygenation:   Assess for changes in respiratory status   Position to facilitate oxygenation and minimize respiratory effort   Assess the need for suctioning and aspirate as needed   Respiratory therapy support as indicated   Assess for changes in mentation and behavior   Assess and instruct to report shortness of breath or any respiratory difficulty     Problem: Discharge Planning  Goal: Discharge to home or other facility with appropriate resources  Outcome: Progressing     Problem: Safety - Adult  Goal: Free from fall injury  10/4/2022 1959 by Payton Rodriguez RN  Outcome: Progressing  10/4/2022 0838 by Kaleb Contreras RCP  Outcome: Progressing     Problem: ABCDS Injury Assessment  Goal: Absence of physical injury  Outcome: Progressing     Problem: Skin/Tissue Integrity  Goal: Absence of new skin breakdown  Description: 1. Monitor for areas of redness and/or skin breakdown  2. Assess vascular access sites hourly  3. Every 4-6 hours minimum:  Change oxygen saturation probe site  4. Every 4-6 hours:  If on nasal continuous positive airway pressure, respiratory therapy assess nares and determine need for appliance change or resting period. Outcome: Progressing     Problem: Pain  Goal: Verbalizes/displays adequate comfort level or baseline comfort level  Outcome: Progressing     Problem: Chronic Conditions and Co-morbidities  Goal: Patient's chronic conditions and co-morbidity symptoms are monitored and maintained or improved  Outcome: Progressing     Problem: Safety - Medical Restraint  Goal: Remains free of injury from restraints (Restraint for Interference with Medical Device)  Description: INTERVENTIONS:  1.  Determine that other, less restrictive measures have been tried or would not be effective before applying the restraint  2. Evaluate the patient's condition at the time of restraint application  3. Inform patient/family regarding the reason for restraint  4.  Q2H: Monitor safety, psychosocial status, comfort, nutrition and hydration  Outcome: Progressing  [place]  Taken 10/4/2022 1900 by Cristian Onofre RN  Remains free of injury from restraints (restraint for interference with medical device):   Determine that other, less restrictive measures have been tried or would not be effective before applying the restraint   Evaluate the patient's condition at the time of restraint application   Inform patient/family regarding the reason for restraint  Taken 10/4/2022 1200 by Cristian Onofre RN  Remains free of injury from restraints (restraint for interference with medical device): Every 2 hours: Monitor safety, psychosocial status, comfort, nutrition and hydration  Taken 10/4/2022 1000 by Cristian Onofre RN  Remains free of injury from restraints (restraint for interference with medical device): Every 2 hours: Monitor safety, psychosocial status, comfort, nutrition and hydration  Taken 10/4/2022 0800 by Cristian Onofre RN  Remains free of injury from restraints (restraint for interference with medical device): Every 2 hours: Monitor safety, psychosocial status, comfort, nutrition and hydration  Taken 10/4/2022 0600 by Christopher Marquez RN  Remains free of injury from restraints (restraint for interference with medical device):   Determine that other, less restrictive measures have been tried or would not be effective before applying the restraint   Inform patient/family regarding the reason for restraint   Every 2 hours: Monitor safety, psychosocial status, comfort, nutrition and hydration

## 2022-10-04 NOTE — PLAN OF CARE
Problem: Safety - Medical Restraint  Goal: Remains free of injury from restraints (Restraint for Interference with Medical Device)  Description: INTERVENTIONS:  1. Determine that other, less restrictive measures have been tried or would not be effective before applying the restraint  2. Evaluate the patient's condition at the time of restraint application  3. Inform patient/family regarding the reason for restraint  4.  Q2H: Monitor safety, psychosocial status, comfort, nutrition and hydration  10/4/2022 1300 by Camron Corrales RN  Outcome: Completed  Flowsheets  Taken 10/4/2022 1200 by Camron Corrales RN  Remains free of injury from restraints (restraint for interference with medical device): Every 2 hours: Monitor safety, psychosocial status, comfort, nutrition and hydration  Taken 10/4/2022 1000 by Camron Corrales RN  Remains free of injury from restraints (restraint for interference with medical device): Every 2 hours: Monitor safety, psychosocial status, comfort, nutrition and hydration  Taken 10/4/2022 0800 by Camron Corrales RN  Remains free of injury from restraints (restraint for interference with medical device): Every 2 hours: Monitor safety, psychosocial status, comfort, nutrition and hydration  Taken 10/4/2022 0600 by Markos Uriostegui RN  Remains free of injury from restraints (restraint for interference with medical device):   Determine that other, less restrictive measures have been tried or would not be effective before applying the restraint   Inform patient/family regarding the reason for restraint   Every 2 hours: Monitor safety, psychosocial status, comfort, nutrition and hydration  Taken 10/4/2022 0435 by Markos Uriostegui RN  Remains free of injury from restraints (restraint for interference with medical device):   Determine that other, less restrictive measures have been tried or would not be effective before applying the restraint   Inform patient/family regarding the reason for restraint   Every 2 hours: Monitor safety, psychosocial status, comfort, nutrition and hydration  Taken 10/4/2022 0400 by Melissa Sears RN  Remains free of injury from restraints (restraint for interference with medical device):   Determine that other, less restrictive measures have been tried or would not be effective before applying the restraint   Inform patient/family regarding the reason for restraint   Every 2 hours: Monitor safety, psychosocial status, comfort, nutrition and hydration  Taken 10/4/2022 0200 by Melissa Sears RN  Remains free of injury from restraints (restraint for interference with medical device):   Determine that other, less restrictive measures have been tried or would not be effective before applying the restraint   Inform patient/family regarding the reason for restraint   Every 2 hours: Monitor safety, psychosocial status, comfort, nutrition and hydration  10/4/2022 0025 by Melissa Sears RN  Outcome: Progressing  Flowsheets  Taken 10/4/2022 0000 by Melissa Sears RN  Remains free of injury from restraints (restraint for interference with medical device):   Determine that other, less restrictive measures have been tried or would not be effective before applying the restraint   Inform patient/family regarding the reason for restraint   Every 2 hours: Monitor safety, psychosocial status, comfort, nutrition and hydration  Taken 10/3/2022 2200 by Melissa Sears RN  Remains free of injury from restraints (restraint for interference with medical device):   Determine that other, less restrictive measures have been tried or would not be effective before applying the restraint   Inform patient/family regarding the reason for restraint   Every 2 hours: Monitor safety, psychosocial status, comfort, nutrition and hydration  Taken 10/3/2022 2000 by Melissa Sears RN  Remains free of injury from restraints (restraint for interference with medical device):   Determine that other, less restrictive measures have been tried or would not be effective before applying the restraint   Inform patient/family regarding the reason for restraint   Every 2 hours: Monitor safety, psychosocial status, comfort, nutrition and hydration  Taken 10/3/2022 1800 by Aurora Johnson RN  Remains free of injury from restraints (restraint for interference with medical device):   Determine that other, less restrictive measures have been tried or would not be effective before applying the restraint   Evaluate the patient's condition at the time of restraint application   Inform patient/family regarding the reason for restraint

## 2022-10-04 NOTE — PLAN OF CARE
Problem: Respiratory - Adult  Goal: Achieves optimal ventilation and oxygenation  10/4/2022 0838 by Leo Clay RCP  Flowsheets (Taken 10/4/2022 1752)  Achieves optimal ventilation and oxygenation:   Assess for changes in respiratory status   Position to facilitate oxygenation and minimize respiratory effort   Assess the need for suctioning and aspirate as needed   Respiratory therapy support as indicated   Assess for changes in mentation and behavior   Assess and instruct to report shortness of breath or any respiratory difficulty     Problem: Skin/Tissue Integrity  Goal: Absence of new skin breakdown  Description: 1. Monitor for areas of redness and/or skin breakdown  Outcome: Progressing   Ventilator Bronchodilator assessment    Breath sounds: diminished clear  Inspiratory Pressure: 20  Plateau Pressure: 20    Patient assessed at level 1          [x]    Bronchodilator Assessment    BRONCHODILATOR ASSESSMENT SCORE  Score 0 (Home) 1 2 3 4   Breath Sounds   []  Chronic Ventilator: Patient at baseline []  Mild Wheezes/ Clear [x]  Intermittent wheezes with good air entry []  Bilateral/unilateral wheezing with diminished air entry []  Insp/Exp wheeze and/or poor aeration   Ventilator Pressures   []  Chronic Ventilator [x]  Insp. Pressure less than 25 cm H20 []  Insp. Pressure less than 25 cm H20 []  Insp. Pressure exceeds 25 cm H20 []  Insp.  Pressure exceeds 30 cm H20   Plateau Pressure []  NA   [x]  Plateau Pressure less than 4  []  Plateau Pressure less than or equal to 5 []  Plateau Pressure greater than or equal to 6 []  Plateau Pressure greater than or equal to [address] [facility]  8:41 AM

## 2022-10-04 NOTE — PLAN OF CARE
Problem: Safety - Medical Restraint  Goal: Remains free of injury from restraints (Restraint for Interference with Medical Device)  Description: INTERVENTIONS:  1. Determine that other, less restrictive measures have been tried or would not be effective before applying the restraint  2. Evaluate the patient's condition at the time of restraint application  3. Inform patient/family regarding the reason for restraint  4.  Q2H: Monitor safety, psychosocial status, comfort, nutrition and hydration  Outcome: Progressing  Flowsheets  Taken 10/3/2022 2200 by Aris Pope RN  Remains free of injury from restraints (restraint for interference with medical device):   Determine that other, less restrictive measures have been tried or would not be effective before applying the restraint   Inform patient/family regarding the reason for restraint   Every 2 hours: Monitor safety, psychosocial status, comfort, nutrition and hydration  Taken 10/3/2022 2000 by Aris Pope RN  Remains free of injury from restraints (restraint for interference with medical device):   Determine that other, less restrictive measures have been tried or would not be effective before applying the restraint   Inform patient/family regarding the reason for restraint   Every 2 hours: Monitor safety, psychosocial status, comfort, nutrition and hydration  Taken 10/3/2022 1800 by Brianda Jones RN  Remains free of injury from restraints (restraint for interference with medical device):   Determine that other, less restrictive measures have been tried or would not be effective before applying the restraint   Evaluate the patient's condition at the time of restraint application   Inform patient/family regarding the reason for restraint     Problem: Respiratory - Adult  Goal: Achieves optimal ventilation and oxygenation  10/4/2022 0025 by Aris Pope RN  Outcome: Progressing  10/3/2022 1809 by Justo Peterson RCP  Outcome: Progressing     Problem: Discharge Planning  Goal: Discharge to home or other facility with appropriate resources  Outcome: Progressing     Problem: Safety - Adult  Goal: Free from fall injury  Outcome: Progressing     Problem: ABCDS Injury Assessment  Goal: Absence of physical injury  Outcome: Progressing     Problem: Skin/Tissue Integrity  Goal: Absence of new skin breakdown  Description: 1. Monitor for areas of redness and/or skin breakdown  2. Assess vascular access sites hourly  3. Every 4-6 hours minimum:  Change oxygen saturation probe site  4. Every 4-6 hours:  If on nasal continuous positive airway pressure, respiratory therapy assess nares and determine need for appliance change or resting period.   Outcome: Progressing     Problem: Pain  Goal: Verbalizes/displays adequate comfort level or baseline comfort level  Outcome: Progressing

## 2022-10-04 NOTE — CONSULTS
Neurology Consult Note        Reason for Consult:  AMS, seizure history  Requesting Physician:  Dr. Lee Lopez:  AMS    History Obtained From:  family member - daughter Eva Rodriguez, electronic medical record       HISTORY OF PRESENT ILLNESS:    The patient is a 59 y.o. female with significant past medical history of recurrent ovarian cancer (initial diagnosis in 2005, follows with Dr. Starla Escalera) with intra-abdominal mets and lymphedema, currently undergoing chemotherapy, recent active intra-abdominal bleeding due to splenic mass with GI infiltration, prior PRES with subsequent seizure disorder on keppra and lamictal who presents with acute onset altered mentation. Per daughter Eva Rodriguez, pt's other daughter noticed pt vomiting on her bed through camera in pt's room and pt thereafter reported to be incoherent and not answering questions appropriately, and behaving in an erratic way. Patient has chronic constipation. Per family, pt recently lost both of her sons and has missed the last few chemotherapy sessions 2/2 depression. Regarding seizure history,  Pt had new onset seizures (generalized tonic-clonic seizures witnessed at [address] [facility]) at age 60 in October 2020; at that time,  LTME showed multiple focal subclinical seizures starting at left posterior quadrant,  MRI brain showed flair hyperintensities in bilateral occipital regions, left parietal, and left frontal lobe with questionable restricted diffusion shaded with those lesions,  Seizures were felt to be 2/2 PRES likely 2/2 hypertensive spikes and underlying immunosuppression 2/2 pt's CA and associated treatments & pt was started on AED (Vimpat). Since then, pt has had multiple hospitalizations for breakthrough seizures with workup negative for any intracerebral metastasis and with appropriate adjustments to AEDs made. Most recently, pt's medications adjusted to Keppra 1 g BID and Lamictal 125 mg BID.      Previous Workup:  LTME 10/24/2020: multiple subclinical seizures originating over L posterior quadrant and spreading across L hemisphere, diffuse left hemispheric sharp wave discharges   LTME 01/04/2021: Diffuse polymorphic theta slowing and occasional left hemispheric superimposed delta slowing suggesting moderate encephalopathy and underlying neuronal dysfunction. No epileptiform activity. LTME 6/19/2021: Diffuse theta slowing, periods of diffuse attenuation, no epileptiform activity. LTME 2/23/2022: Diffuse theta slowing, and left occipital temporal lateralized periodic discharges with underlying rhythmicity suggesting increased risk for focal onset seizures  CSF analysis 10/2020: WBC, 0 RBC, total protein 25.4, glucose 78. Negative meningitis encephalitis panel. Negative cryptococcal antigen. MRI Brain w w/o contrast 2/24/2022: No evidence of metastatic disease, residual mild T2 hyperintensities in the occipital region bilaterally consistent with PRESS. Significant improvement compared to 10/2020  CT Head 10/3/2022: no acute intracranial abnormality  CTA H/N 10/3/2022: no evidence for sig stenosis or occlusion      Past Medical History:        Diagnosis Date    Anemia     Bleeding 10/2020    intra-abdominal bleeding -due to splenic mass with GI infiltration. Status post embolization    Cervical cancer (HCC)     Depression     Diabetes mellitus ([address]     GERD (gastroesophageal reflux disease)     Hx of blood clots     Hypertension     Metastatic cancer ([address] 10/2020    extensive intraabdominal and splenic involvement and lung mets.     Ovarian cancer ([address]     low grade serous ovarian carcinoma    Post chemo evaluation     2007: Chemo via med onc (Dr. Kimberley Horton), 2008: Chemo due to rising CA-125, 2013: intraperitoneal chemo,12/2015: Ca125 - 25     Splenic lesion      Past Surgical History:        Procedure Laterality Date    ABSCESS DRAINAGE  2013    Franca rectal    ANUS SURGERY      ANAL FISSURECTOMY    CARDIAC CATHETERIZATION      COLECTOMY 03/2013    ex lap, tumor debulking, transverse colectomy w reanastamosis, subgastric omentectomy, intraperitoneal port placement    HYSTERECTOMY, TOTAL ABDOMINAL (CERVIX REMOVED)      IR EMBOLIZATION HEMORRHAGE  10/05/2020    intra-abdominal bleeding -due to splenic mass with GI infiltration. Status post embolization [employer] interlock coils x7. mri condtional 3t ok, safe immediately post implant.     IR PORT PLACEMENT EQUAL OR GREATER THAN 5 YEARS  08/24/2020    IR PORT PLACEMENT EQUAL OR GREATER THAN 5 YEARS 8/24/2020 Bolivar Sexton MD [facility] SPECIAL PROCEDURES    PORT SURGERY      IP Port    TONSILLECTOMY       Current Medications:   Current Facility-Administered Medications: docusate (COLACE) 50 MG/5ML liquid 100 mg, 100 mg, Oral, Daily  apixaban (ELIQUIS) tablet 5 mg, 5 mg, Oral, BID  furosemide (LASIX) tablet 20 mg, 20 mg, Oral, Daily  lamoTRIgine (LAMICTAL) tablet 125 mg, 125 mg, Oral, BID  levETIRAcetam (KEPPRA) tablet 1,000 mg, 1,000 mg, Oral, QPM  oxyCODONE-acetaminophen (PERCOCET) 5-325 MG per tablet 1 tablet, 1 tablet, Oral, Q4H PRN  sertraline (ZOLOFT) tablet 100 mg, 100 mg, Oral, Daily  melatonin tablet 5 mg, 5 mg, Oral, Nightly  propofol injection, 5-50 mcg/kg/min, IntraVENous, Continuous  fentaNYL 50 mcg/mL 50 mL infusion,  mcg/hr, IntraVENous, Continuous  chlorhexidine (PERIDEX) 0.12 % solution 15 mL, 15 mL, Mouth/Throat, BID  sodium chloride flush 0.9 % injection 5-40 mL, 5-40 mL, IntraVENous, 2 times per day  sodium chloride flush 0.9 % injection 5-40 mL, 5-40 mL, IntraVENous, PRN  0.9 % sodium chloride infusion, , IntraVENous, PRN  ondansetron (ZOFRAN-ODT) disintegrating tablet 4 mg, 4 mg, Oral, Q8H PRN **OR** ondansetron (ZOFRAN) injection 4 mg, 4 mg, IntraVENous, Q6H PRN  polyethylene glycol (GLYCOLAX) packet 17 g, 17 g, Oral, Daily PRN  acetaminophen (TYLENOL) tablet 650 mg, 650 mg, Oral, Q6H PRN **OR** acetaminophen (TYLENOL) suppository 650 mg, 650 mg, Rectal, Q6H PRN  0.9 % sodium chloride infusion, , IntraVENous, Continuous  potassium chloride 20 mEq/50 mL IVPB (Central Line), 20 mEq, IntraVENous, PRN **OR** potassium chloride 10 mEq/100 mL IVPB (Peripheral Line), 10 mEq, IntraVENous, PRN  famotidine (PEPCID) 20 mg in sodium chloride (PF) 10 mL injection, 20 mg, IntraVENous, BID  piperacillin-tazobactam (ZOSYN) 3,375 mg in dextrose 5 % 50 mL IVPB extended infusion (mini-bag), 3,375 mg, IntraVENous, Q8H  vancomycin (VANCOCIN) intermittent dosing (placeholder), , Other, RX Placeholder  vancomycin (VANCOCIN) 750 mg in sodium chloride 0.9 % 250 mL IVPB, 750 mg, IntraVENous, Q8H  Allergies:  Carboplatin and Ceftriaxone    Social History:  TOBACCO:   reports that she has been smoking cigarettes. She has a 20.00 pack-year smoking history. She has never used smokeless tobacco.  ETOH:   reports that she does not currently use alcohol. DRUGS:   reports no history of drug use. Family History:       Problem Relation Age of Onset    Alcohol Abuse Mother     Cirrhosis Mother        REVIEW OF SYSTEMS:  Review of Systems   Unable to perform ROS: Mental status change       PHYSICAL EXAM:    Vitals:  /67   Pulse 64   Temp 98.4 °F (36.9 °C) (Bladder)   Resp 23   Ht 5' 5\" (1.651 m)   Wt 153 lb 14.1 oz (69.8 kg)   SpO2 97%   BMI 25.61 kg/m²      General Appearance:  Appears restless but in NAD. BP 90s/60s while at bedside with HR in 70s. Not toxic or ill appearing. Exam limited due to patient's mentation. Mental status   Alert. Disoriented x4. No dysarthria but is incoherent, gives 1 word answers or says short unrelated phrase, says \"yes\" to everything inappropriately, repeats herself, minimal to no following of commands, language inappropriate, restless, recurrently trying to swallow invisible pill by trying to eat O2 monitor on finger.      Cranial nerves   II, III, IV, VI - extra-ocular muscles full: no pupillary defect; no ptosis           VII -  No obvious facial asymmetry  VIII - intact hearing to conversational tone          IX, X - symmetrical palate elevation   XII - tongue midline   Motor function  5/5 in b/l upper extremity and with good  strength  Unable to test grade of strength in lower extremities due to pt not following commands but pt is moving both legs purposefully without any apparent weakness or drift  Normal muscle bulk. No increased tone   Cerebellar Unable to test due to pt not following commands   Reflex function 2+ b/l symmetric in biceps, brachioradialis, patellar   Gait                  Not assessed         DATA  Lab Results:   CBC:   Recent Labs     10/03/22  1339 10/04/22  0546   WBC 10.2 6.9   HGB 14.2 11.5*    174     BMP:    Recent Labs     10/03/22  1314 10/03/22  1341 10/04/22  0546   NA  --  140 134*   K  --  4.2 3.6*   CL  --  100 104   CO2  --  26 21   BUN  --  8 6   CREATININE 0.38* 0.42* 0.28*   GLUCOSE  --  158* 123*         Lab Results   Component Value Date    CHOL 131 03/10/2021    LDLCHOLESTEROL 69 03/10/2021    HDL 33 (L) 03/10/2021    TRIG 147 03/10/2021    ALT 9 10/03/2022    AST 18 10/03/2022    TSH 2.93 03/09/2021    INR 1.2 05/03/2022    LABA1C 5.2 04/13/2022    [id number] 654 06/04/2022       No results found for: PHENYTOIN, PHENYTOIN, VALPROATE, CBMZ    Imaging:  CT HEAD WO CONTRAST    Result Date: 10/3/2022  1. No acute intracranial abnormality. 2. No evidence for significant stenosis or occlusion. 3. Consolidation in the visualized lungs bilaterally that is worse on the right compared to the left. Correlate with report from dedicated CT chest, abdomen, and pelvis for further discussion. XR CHEST PORTABLE    Result Date: 10/3/2022  1. Endotracheal tube tip lies 5 cm above the alejandro. 2. Enteric tube in the stomach with the tip at the level of the proximal to mid gastric body. 3. Stable right mid I a frame elevation resulting in right hemithorax volume loss.  4. There is progressive curvilinear right lung base on initial presentation. Pt now extubated and off sedation. Remains disoriented and confused but no obvious motor deficits on exam.   CT Head negative for acute intracranial abnormality. CTA H/N negative for stenosis or occlusion. Impression is encephalopathy of unknown etiology;  Consider PRES vs. Postictal as possible causes,  Will order MRI brain w and wo contrast and routine EEG. Recommend rule out underlying infectious etiologies - will leave workup/management to primary team.  Consider Oncology consult. Daughter Bonnie Bowens spoken to on phone in afternoon for further history. Daughter Gino Gifford who is primary caretaker also called by Neuro team around 5:30 pm but call went to voicemail.        Shelley Benz DO  Neurology Resident PGY2  10/4/2022

## 2022-10-04 NOTE — ED NOTES
Patient transported to ICU with RT x 2, on cardiac monitor and vent. Daughter present at bedside, no questions or concerns at this time.      Macario Chaudhary RN  17/03/73 013

## 2022-10-04 NOTE — ACP (ADVANCE CARE PLANNING)
Advance Care Planning     Advance Care Planning (ACP) Physician/NP/PA (Provider) Conversation      Date of ACP Conversation: 10/3/2022    Conversation Conducted with:   Patient with [address]:    Patient has POA paperwork for health present in the EHR and patient's son Yuan Arteaga has been named as patient's POA for health and patient's daughter Peg Walsh is first alternate    Patient's son Yuan Arteaga passed away recently and thus patient's daughter Peg Walsh is patient's POA for health as she is the first alternate in the POA paperwork for health    Patient also was explained about this and understands that Peg Walsh is now her POA for health      Care Preferences:    Hospitalization: \"If your health worsens and it becomes clear that your chance of recovery is unlikely, what would your preference be regarding hospitalization? \"  Currently hospitalized    Ventilation: \"If you were in your present state of health and suddenly became very ill and were unable to breathe on your own, what would your preference be about the use of a ventilator (breathing machine) if it was available to you? \"    Full code    \"If your health worsens and it becomes clear that your chance of recovery is unlikely, what would your preference be about the use of a ventilator (breathing machine) if it was available to you? \"   Full code    Resuscitation:  \"CPR works best to restart the heart when there is a sudden event, like a heart attack, in someone who is otherwise healthy. Unfortunately, CPR does not typically restart the heart for people who have serious health conditions or who are very sick. \"    \"In the event your heart stopped as a result of an underlying serious health condition, would you want attempts to be made to restart your heart (answer \"yes\" for attempt to resuscitate) or would you prefer a natural death (answer \"no\" for do not attempt to resuscitate)? \"   Full code    Conversation Outcomes / Follow-Up Plan:     Patient has POA paperwork for health present in the EHR and patient's son Afsaneh Avery has been named as patient's POA for health and patient's daughter Juan Barber is first alternate    Patient's son Afsaneh Avery passed away recently and thus patient's daughter Juan Barber is patient's POA for health as she is the first alternate in the POA paperwork for health    Patient also was explained about this and understands that Juan Barber is now her POA for health    Full code      Length of Voluntary ACP Conversation in minutes:  16 minutes      Vearl Barthel, MD

## 2022-10-04 NOTE — CONSULTS
Palliative Care Inpatient Consult    NAME:  Avi Dyson Roseboom  MEDICAL RECORD NUMBER:  [id number]  AGE: 59 y.o.    GENDER: female  : 1965  TODAY'S DATE:  10/4/2022    Reasons for Consultation:    Symptom and/or pain management  Provision of information regarding PC and/or hospice philosophies  Complex, time-intensive communication and interdisciplinary psychosocial support  Clarification of goals of care and/or assistance with difficult decision-making  Guidance in regards to resources and transition(s)    Members of PC team contributing to this consultation are :  Dr. Francie Up care attending  Plan      Palliative Interaction:  The patient was seen today, remains intubated but alert, awake, following commands  Patient's daughter Lawyer Karimi was present in patient's room  I introduced myself to the patient and explained her and  Lie the role of palliative care and told them that palliative care is an extra layer of support and strength for the patient and family     Lie called her sister Carlyle Norman on the phone so that she could also take part in the conversation    I asked the patient if I could talk to  Lie and her presence since she was having difficulty in talking due to being intubated and she agreed with that   Lie told that patient is  and had 4 children and 2 of her sons are  and she and Carlyle Norman are her remaining living children    I explained to the patient and  Lie that patient's POA paperwork for health present here has her son Robby Deluca as her POA for health and her daughter Lawyer Karimi is the first alternate     Lie told that Robby Deluca passed away recently    I explained to the patient and  Lie that since Robby Deluca is  thus  Lie being the first alternate on the POA paperwork for health will be her POA for health and both the patient and  Lie acknowledged their understanding    I discussed patient's current medical conditions with  Lie and explained her that patient cancer has been progressing  I explained the different types of codes to Fadumo Irvin    I offered comfort and emotional support to the patient and family    I met with patient's RN and discussed patient's current medical conditions with her and she informed me that patient is undergoing weaning trial    I also met with critical care team and attending Dr. Yahaira Gomez and discussed patient's current medical conditions with them and also updated them regarding my conversation with patient and daughter Fadumo Ivrin      Education/support to staff  Education/support to family  Education/support to patient  Discharge planning/helping to coordinate care  Communications with primary service  Caregiver support/education  Code status clarified: Full Code  Code status clarified: [facility]  Code status clarified: [facility]  Other major issues      History of Present Illness     The patient is a 59 y.o. Non- / non  female who presents with Altered Mental Status      Referred to Palliative Care by   [x] Physician   [] Nursing  [] Family Request   [] Other:       She was admitted to the critical care service for Septicemia [facility]) [A41.9]  Altered mental status, unspecified altered mental status type [R41.82]  AMS (altered mental status) [R41.82]. Her hospital course has been associated with Septicemia ([address] The patient has a complicated medical history and has been hospitalized since 10/3/2022  1:07 PM.  The history has been obtained from patient's records. Patient was brought in by the EMS were called by patient's daughter who found the patient very lethargic and having altered mental status changes. Patient was found to be febrile and tachycardic and was intubated for airway protection and sepsis work-up was initiated in the ED. Patient has history of metastatic ovarian cancer with multiple recurrences. CT head was negative for any acute processes including cranial meta stasis.   Patient has history of intra-abdominal bleeding secondary to splenic mass with GI infiltration, status post embolization of the spleen. General surgery was consulted for ruling out any bowel obstruction but did not recommend any surgical intervention. Palliative care consulted for goals of care and family support. Active Hospital Problems    Diagnosis Date Noted    Septicemia [facility]) [A41.9] 10/03/2022     Priority: High    AMS (altered mental status) [R41.82] 02/20/2022       PAST MEDICAL HISTORY      Diagnosis Date    Anemia     Bleeding 10/2020    intra-abdominal bleeding -due to splenic mass with GI infiltration. Status post embolization    Cervical cancer (HCC)     Depression     Diabetes mellitus ([facility]     GERD (gastroesophageal reflux disease)     Hx of blood clots     Hypertension     Metastatic cancer ([facility] [facility] 10/2020    extensive intraabdominal and splenic involvement and lung mets. Ovarian cancer ([facility]     low grade serous ovarian carcinoma    Post chemo evaluation     2007: Chemo via med onc (Dr. Tracie Kenney), 2008: [facility] due to rising CA-125, 2013: intraperitoneal chemo,12/2015: Ca125 - 25     Splenic lesion        PAST SURGICAL HISTORY  Past Surgical History:   Procedure Laterality Date    ABSCESS DRAINAGE  2013    Franca rectal    ANUS SURGERY      ANAL FISSURECTOMY    CARDIAC CATHETERIZATION      COLECTOMY  03/2013    ex lap, tumor debulking, transverse colectomy w reanastamosis, subgastric omentectomy, intraperitoneal port placement    HYSTERECTOMY, TOTAL ABDOMINAL (CERVIX REMOVED)      IR EMBOLIZATION HEMORRHAGE  10/05/2020    intra-abdominal bleeding -due to splenic mass with GI infiltration. Status post embolization [employer] interlock coils x7. mri condtional 3t ok, safe immediately post implant.     IR PORT PLACEMENT EQUAL OR GREATER THAN 5 YEARS  08/24/2020    IR PORT PLACEMENT EQUAL OR GREATER THAN 5 YEARS 8/24/2020 Ld Pickett MD [facility] SPECIAL PROCEDURES    PORT SURGERY      IP Port    TONSILLECTOMY SOCIAL HISTORY  Social History     Tobacco Use    Smoking status: Every Day     Packs/day: 1.00     Years: 20.00     Pack years: 20.00     Types: Cigarettes    Smokeless tobacco: Never   Vaping Use    Vaping Use: Never used   Substance Use Topics    Alcohol use: Not Currently    Drug use: Never       ALLERGIES  Allergies   Allergen Reactions    Carboplatin Anaphylaxis    Ceftriaxone Rash     Had a rash on ceftriaxone December 2020, [facility]         MEDICATIONS  Current Medications    docusate  100 mg Oral Daily    levETIRAcetam  500 mg IntraVENous Q12H    chlorhexidine  15 mL Mouth/Throat BID    sodium chloride flush  5-40 mL IntraVENous 2 times per day    enoxaparin  40 mg SubCUTAneous Daily    famotidine (PEPCID) injection  20 mg IntraVENous BID    piperacillin-tazobactam  3,375 mg IntraVENous Q8H    vancomycin (VANCOCIN) intermittent dosing (placeholder)   Other RX Placeholder    vancomycin  750 mg IntraVENous Q8H     sodium chloride flush, sodium chloride, ondansetron **OR** ondansetron, polyethylene glycol, acetaminophen **OR** acetaminophen, potassium chloride **OR** potassium chloride  IV Drips/Infusions   propofol Stopped (10/04/22 0819)    fentaNYL 50 mcg/mL 100 mcg/hr (10/04/22 0722)    dexmedetomidine 0.4 mcg/kg/hr (10/04/22 0944)    sodium chloride Stopped (10/04/22 0056)    sodium chloride 100 mL/hr at 10/04/22 0534     Home Medications  No current facility-administered medications on file prior to encounter. Current Outpatient Medications on File Prior to Encounter   Medication Sig Dispense Refill    morphine (MS CONTIN) 15 MG extended release tablet TAKE 1 TABLET BY MOUTH 2 TIMES DAILY FOR 30 DAYS. 60 tablet 0    lamoTRIgine (LAMICTAL) 25 MG tablet Take 5 tablets by mouth 2 times daily 300 tablet 2    LORazepam (ATIVAN) 1 MG tablet TAKE 1 TABLET BY MOUTH EVERY 8 HOURS AS NEEDED FOR ANXIETY FOR UP TO 30 DAYS.  90 tablet 0    morphine (MS CONTIN) 30 MG extended release tablet TAKE 1 TABLET BY MOUTH 2 TIMES DAILY FOR 30 DAYS. 60 tablet 0    sertraline (ZOLOFT) 100 MG tablet TAKE 1 TABLET BY MOUTH DAILY 30 tablet 4    oxyCODONE-acetaminophen (PERCOCET) 5-325 MG per tablet Take 1 tablet by mouth every 4 hours as needed for Pain for up to 30 days. 180 tablet 0    FEROSUL 325 (65 Fe) MG tablet TAKE 1 TABLET BY MOUTH ONCE DAILY WITH BREAKFAST 30 tablet 2    levETIRAcetam (KEPPRA) 1000 MG tablet TAKE 1 TABLET (1,000 MG TOTAL) BY MOUTH IN THE MORNING AND 1 TABLET (1,000 MG TOTAL) BEFORE BEDTIME. 60 tablet 3    FEROSUL 325 (65 Fe) MG tablet TAKE 1 TABLET (325 MG TOTAL) BY MOUTH DAILY WITH BREAKFAST. 30 tablet 2    pantoprazole (PROTONIX) 40 MG tablet Take 1 tablet by mouth daily 30 tablet 3    Lactobacillus (ACIDOPHILUS) CAPS capsule TAKE 1 TABLET BY MOUTH ONCE DAILY IN THE MORNING AND 1 TABLET BEFORE BEDTIME 60 capsule 5    furosemide (LASIX) 20 MG tablet TAKE 1 TABLET (20 MG TOTAL) BY MOUTH DAILY. 30 tablet 3    dicyclomine (BENTYL) 20 MG tablet TAKE 1 TABLET (20 MG TOTAL) BY MOUTH IN THE MORNING AND 1 TABLET (20 MG TOTAL) BEFORE BEDTIME. 60 tablet 3    potassium chloride (MICRO-K) 10 MEQ extended release capsule TAKE 1 CAPSULE (10 MEQ TOTAL) BY MOUTH IN THE MORNING.  30 capsule 3    Handicap Placard MISC 5 years 1 each 0    loperamide (IMODIUM) 2 MG capsule Take 2 mg by mouth 4 times daily as needed for Diarrhea      ondansetron (ZOFRAN-ODT) 4 MG disintegrating tablet Take 1 tablet by mouth every 8 hours as needed for Nausea or Vomiting 60 tablet 2    senna (SENOKOT) 8.6 MG tablet Take 1 tablet by mouth 2 times daily 60 tablet 11    melatonin 5 MG TABS tablet Take 1 tablet by mouth daily 30 tablet 3    benzonatate (TESSALON PERLES) 100 MG capsule Take 1 capsule by mouth 3 times daily as needed for Cough 30 capsule 1    apixaban (ELIQUIS) 5 MG TABS tablet Take 1 tablet by mouth 2 times daily 60 tablet 5    hydrocortisone (ANUSOL-HC) 2.5 % CREA rectal cream Apply on affected area twice daily 1 each 1 Data         /84   Pulse 73   Temp 98.4 °F (36.9 °C) (Bladder)   Resp 22   Ht 5' 5\" (1.651 m)   Wt 153 lb 14.1 oz (69.8 kg)   SpO2 94%   BMI 25.61 kg/m²     Wt Readings from Last 3 Encounters:   10/04/22 153 lb 14.1 oz (69.8 kg)   09/29/22 147 lb (66.7 kg)   09/01/22 151 lb 6.4 oz (68.7 kg)        Code Status: Full Code     ADVANCED CARE PLANNING:  Patient has capacity for medical decisions: yes  Health Care Power of : yes  Living Will: no     Personal, Social, and Family History  Marital Status:   Living situation: alone  Does patient understand diagnosis/treatment? yes  Does caregiver understand diagnosis/treatment? yes    Past Medical History:   Diagnosis Date    Anemia     Bleeding 10/2020    intra-abdominal bleeding -due to splenic mass with GI infiltration. Status post embolization    Cervical cancer (HCC)     Depression     Diabetes mellitus ([facility]     GERD (gastroesophageal reflux disease)     Hx of blood clots     Hypertension     Metastatic cancer ([address] 10/2020    extensive intraabdominal and splenic involvement and lung mets.     Ovarian cancer ([facility] [address]     low grade serous ovarian carcinoma    Post chemo evaluation     2007: Chemo via med onc (Dr. Jonetta Halsted), 2008: [facility] due to rising CA-125, 2013: intraperitoneal chemo,12/2015: Ca125 - 25     Splenic lesion          Family History   Problem Relation Age of Onset    Alcohol Abuse Mother     Cirrhosis Mother        Social History     Tobacco Use    Smoking status: Every Day     Packs/day: 1.00     Years: 20.00     Pack years: 20.00     Types: Cigarettes    Smokeless tobacco: Never   Vaping Use    Vaping Use: Never used   Substance Use Topics    Alcohol use: Not Currently    Drug use: Never           Assessment        REVIEW OF SYSTEMS  ROS unable to be done, patient intubated  Constitutional: no fever, no chills or weight loss  Eyes: no eye pain or blurred vision  ENT: no hearing loss, congestion, or difficulty swallowing Respiratory: no wheezing, chest tightness, or shortness of breath   Cardiovascular: no chest pain or pressure, no palpitations, no diaphoresis   Gastrointestinal: no nausea, vomiting,abdominal pain, diarrhea or constipation, no melena   Genitourinary: no dysuria, frequency, hematuria, or nocturia   Musculoskeletal: no myalgias or arthralgias, no back pain   Skin: no rashes or sores   Neurological: no focal weakness, numbness, tingling or headache, no seizures    PHYSICAL ASSESSMENT:  Constitutional: Alert, awake,oriented, intubated  Head: Normocephalic and atraumatic   Eyes: EOM are normal  Neck: ET tube present  Cardiovascular: Normal rate and regular rhythm  Pulmonary/Chest: On mechanical ventilator  Abdomen: not distended, no ascites  Musculoskeletal: Normal range of motion, no edema lower ext. Neurological: Alert, awake, oriented, following commands  Skin: Normal turgor, no bleeding, no bruising. Palliative Performance Scale:  ___60%  Ambulation reduced; Significant disease; Can't do hobbies/housework; intake normal or reduced; occasional assist; LOC full/confusion  ___50%  Mainly sit/lie; Extensive disease; Can't do any work; Considerable assist; intake normal or reduced; LOC full/confusion  ___40%  Mainly in bed; Extensive disease; Mainly assist; intake normal or reduced; LOC full/confusion   __x_30%  Bed Bound; Extensive disease; Total care; intake reduced; LOC full/confusion  ___20%  Bed Bound; Extensive disease; Total care; intake minimal; Drowsy/coma  ___10%  Bed Bound; Extensive disease; Total care; Mouth care only; Drowsy/coma  ___0       Death      Principle Problem/Diagnosis:  Septicemia ([facility]    Additional Assessments:   Principal Problem:    Se[facility]emia ([facility]  Active Problems:    AMS (altered mental status)  Metastatic ovarian cancer    1- Symptom management/ pain control     Pain Assessment:  Pain is controlled with current analgesics.   Medication(s) being used: acetaminophen, narcotic analgesics including fentanyl                Anxiety:  none                          Dyspnea:  none                          Fatigue:  none    Other: Intubated    We feel the patient symptoms are being controlled. her current regimen is reviewed by myself and discussed with the staff. We will follow-up with the patient and family for further goals of care and CODE STATUS discussion  We will continue to provide comfort and support to the patient and family    2- Goals of care evaluation   The patient goals of care are improve or maintain function/quality of life, accomplish a particular personal goal, spiritual needs, remain at home, strengthening relationships, preserve independence/autonomy/control, and support for family/caregiver   Goals of care discussed with:    [] Patient independently    [x] Patient and Family    [] Family or Healthcare DPOA independently    [] Unable to discuss with patient, family/DPOA not present    3- Code Status  Full Code    4- Other recommendations   - We will continue to provide comfort and support to the patient and the family  Please call with any palliative questions or concerns. Palliative Care Team is available via perfect serve or via phone. Palliative Care will continue to follow Ms. Garcia's care as needed. Thank you for allowing Palliative Care to participate in the care of Ms. Garcia . This note has been dictated by dragon, typing errors may be a possibility.     The total time I spent in seeing the patient, discussing goals of care, advanced directives, code status, greater than 50% time in counseling and other major issues was more than 70 minutes      Electronically signed by   Mendez Garcia MD  Palliative Care Team  on 10/4/2022 at 10:28 AM    Palliative care office: [phone]

## 2022-10-04 NOTE — PROGRESS NOTES
Order obtained for extubation. Positive cuff leak   SpO2 of 98 on 30% FiO2. Patient extubated and placed on 3 liters/min via nasal cannula. Post extubation SpO2 is 96% with HR  69 bpm and RR 18 breaths/min. Patient had strong cough that was non-productive. Extubation Well tolerated by patient. .   Breath Sounds: Clear diminished in all lobes.        ARNOLD ALBERT RCP   12:04 PM

## 2022-10-04 NOTE — PROGRESS NOTES
Physician Progress Note      Ginny Oquendo  [facility] #:                  [id number]  :                       1965  ADMIT DATE:       10/3/2022 1:07 PM  DISCH DATE:  RESPONDING  PROVIDER #:        KELY RIVERA DO          QUERY TEXT:    Patient admitted with AMS with noted Sepsis in H&P and progress notes on 10/4   with WBC 10.2>6.9, lymphocytes 4.18, CRP 21.4, lactic acid 2.5. heart rate in   100s , temperature  [de-identified] and ordered on iv Vanco and Zosyn. Per H&P on   10/3 concern for aspiration pneumonia. CT chest shows progressive bibasilar   and left lower lobe consolidative change may represent atelectasis vs   superimposed pneumonia. pateint noted to be immunocompromised due to cancer and   chemotherapy. If possible please clarify one of the following: The medical record reflects the following:  Risk Factors: age, ovarian cancer with mets, Chemotherapy ,  Clinical Indicators: admitted with AMS with noted Sepsis in H&P and progress   notes on 10/4 with WBC 10.2>6.9, lymphocytes 4.18, CRP 21.4, lactic acid 2.5.   heart rate in 100s , temperature  [de-identified] and ordered on iv Vanco and Zosyn. Per H&P on 10/3 concern for aspiration pneumonia. CT chest shows progressive   bibasilar and left lower lobe consolidative change may represent atelectasis   vs superimposed pneumonia. pateint noted to be immunocompromised due to cancer   and chemotherapy  Treatment: ICU monitoring, iv antibiotics, mechanical ventilation    Thank Lemont Goodell RN BSN  CCDS  Email [email or web address]  Cell [phone]  office hours M-F 6am to 2:30pm  Options provided:  -- Sepsis likely  due to aspiration pneumonia  -- Sepsis likely due to gram negative pneumonia  -- Sepsis and pneumonia were  ruled out after study  -- Sepsis ruled out aspiration pneumonia confirmed  -- Sepsis ruled out gram negative pneumonia confirmed  -- Other - I will add my own diagnosis  -- Disagree - Not applicable / Not valid  -- Disagree - Clinically unable to determine / Unknown  -- Refer to Clinical Documentation Reviewer    PROVIDER RESPONSE TEXT:    Sepsis is likely due to aspiration pneumonia    Query created by:  Geoffrey Herrera on 10/4/2022 9:22 AM      Electronically signed by:  Janora Bence 10/4/2022 3:30 PM

## 2022-10-04 NOTE — PROGRESS NOTES
PROGRESS NOTE          PATIENT NAME: Josep Callejas [facility]  MEDICAL RECORD NO. [id number]  DATE: 10/4/2022  SURGEON: Dr. Wesley Files: Alfonso Christianson,     HD: # 1    ASSESSMENT    Patient Active Problem List   Diagnosis    Malignant neoplasm of ovary ([address]    ACP (advance care planning)    Seizure ([address]    Type 2 diabetes mellitus without complication, without long-term current use of insulin ([address]    Anemia    Splenic abscess    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)    Acute encephalopathy    PRES (posterior reversible encephalopathy syndrome)    Hemianopia, bitemporal    Encephalopathy acute    Status epilepticus ([address]    History of ovarian cancer    Pleural effusion    Bandemia    Cancer, metastatic to bone (HCC)    Intractable low back pain    Fatigue    Chronic deep vein thrombosis (DVT) of femoral vein of left lower extremity (HCC)    Iron deficiency anemia secondary to inadequate dietary iron intake    Iron malabsorption    Gynecologic cancer ([address]    Thrombocytosis    History of deep venous thrombosis (DVT) of distal vein of left lower extremity: On Eliquis    Pelvic mass    Acute on chronic anemia    AMS (altered mental status)    Lactic acidosis    Anxiety    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)    Diabetes mellitus (HCC)    Gastroesophageal reflux disease    Recurrent major depressive disorder, in partial remission (HCC)    Ileus (HCC)    Pericardial effusion    Acute respiratory failure with hypoxia (HCC)    Acute metabolic encephalopathy    Confusion    Urinary tract infection without hematuria    Seizure disorder (HCC)    Breakthrough seizure ([address]    Leg swelling    Iron deficiency    Septicemia [facility])       MEDICAL DECISION MAKING AND PLAN    Dilated colon with stricture in sigmoid   Unchanged from June 2022   Serial abdominal exams   Recommend GI consult for possible dilation   No surgical intervention at this time.         SUBJECTIVE    Josep Callejas Geoffsbertha was seen at bedside. Patient extubated. MRI pending. Patient unsure if she is passing gas. OBJECTIVE  VITALS: Temp: Temp: 98.4 °F (36.9 °C)Temp  Av.2 °F (37.3 °C)  Min: 98.2 °F (36.8 °C)  Max: 100.9 °F (01.1 °C) BP Systolic (00YEJ), JPI:694 , Min:96 , MKT:581   Diastolic (84ISG), ZHY:25, Min:54, Max:185   Pulse Pulse  Av.2  Min: 47  Max: 127 Resp Resp  Av.5  Min: 14  Max: 28 Pulse ox SpO2  Av.3 %  Min: 85 %  Max: 100 %  GENERAL: alert, no distress  NEURO: answers questions intermittently  ABDOMEN: soft, non-tender, non-distended, and no guarding or peritoneal signs present  EXTREMITY: no cyanosis, clubbing or edema    I/O last 3 completed shifts: In: 3159.3 [I.V.:2470.9; Other:100; NG/GT:60; IV Piggyback:528.5]  Out: 2074 [Urine:1774; Emesis/NG output:300]    Drain/tube output: In: 4205.8 [I.V.:3130.7;  Other:100; NG/GT:100]  Out: 2967 [Urine:2667]    LAB:  CBC:   Recent Labs     10/03/22  1339 10/04/22  0546   WBC 10.2 6.9   HGB 14.2 11.5*   HCT 44.5 36.9   MCV 91.2 92.9    174     BMP:   Recent Labs     10/03/22  1314 10/03/22  1341 10/04/22  0546   NA  --  140 134*   K  --  4.2 3.6*   CL  --  100 104   CO2  --  26 21   BUN  --  8 6   CREATININE 0.38* 0.42* 0.28*   GLUCOSE  --  158* 123*     COAGS:   Recent Labs     10/03/22  1341   PROT 7.0       RADIOLOGY:  No new images

## 2022-10-04 NOTE — CARE COORDINATION
10/04/22 1113   Service Assessment   Patient Orientation Other (see comment)  (intubated)   History Provided By Child/Family  (daughter Andrea)   Primary Caregiver Family   Accompanied By/Relationship daughter Vahe Hui   PCP Verified by CM Yes   Last Visit to PCP Within last 6 months   Can patient return to prior living arrangement Unknown at present   Ability to make needs known: Unable   Social/Functional History   Lives With Son;Daughter   Type of [address]; Able to Live on Main level with bedroom/bathroom   [address]   Active  No   Discharge Planning   Type of Residence [employer]   Living Arrangements Children   Current Services Prior To Admission Durable Medical Equipment   Current DME Prior to [facility]   Patient expects to be discharged to: House   Condition of Participation: Discharge Planning   The Plan for Transition of Care is related to the following treatment goals: increased comfort level   From home with children, follow for needs

## 2022-10-05 ENCOUNTER — APPOINTMENT (OUTPATIENT)
Dept: GENERAL RADIOLOGY | Age: 57
DRG: 871 | End: 2022-10-05
Payer: COMMERCIAL

## 2022-10-05 ENCOUNTER — TELEPHONE (OUTPATIENT)
Dept: INFUSION THERAPY | Facility: MEDICAL CENTER | Age: 57
End: 2022-10-05

## 2022-10-05 ENCOUNTER — TELEPHONE (OUTPATIENT)
Dept: ONCOLOGY | Age: 57
End: 2022-10-05

## 2022-10-05 LAB
ABSOLUTE EOS #: <0.03 K/UL (ref 0–0.44)
ABSOLUTE IMMATURE GRANULOCYTE: 0.03 K/UL (ref 0–0.3)
ABSOLUTE LYMPH #: 0.99 K/UL (ref 1.1–3.7)
ABSOLUTE MONO #: 0.15 K/UL (ref 0.1–1.2)
ANION GAP SERPL CALCULATED.3IONS-SCNC: 11 MMOL/L (ref 9–17)
ANION GAP SERPL CALCULATED.3IONS-SCNC: 14 MMOL/L (ref 9–17)
BASOPHILS # BLD: 1 % (ref 0–2)
BASOPHILS ABSOLUTE: 0.05 K/UL (ref 0–0.2)
BUN BLDV-MCNC: 3 MG/DL (ref 6–20)
CALCIUM SERPL-MCNC: 8.3 MG/DL (ref 8.6–10.4)
CHLORIDE BLD-SCNC: 101 MMOL/L (ref 98–107)
CHLORIDE BLD-SCNC: 102 MMOL/L (ref 98–107)
CO2: 26 MMOL/L (ref 20–31)
CO2: 26 MMOL/L (ref 20–31)
CREAT SERPL-MCNC: 0.35 MG/DL (ref 0.5–0.9)
EOSINOPHILS RELATIVE PERCENT: 0 % (ref 1–4)
GFR SERPL CREATININE-BSD FRML MDRD: >60 ML/MIN/1.73M2
GLUCOSE BLD-MCNC: 106 MG/DL (ref 70–99)
HCT VFR BLD CALC: 39.2 % (ref 36.3–47.1)
HEMOGLOBIN: 12.7 G/DL (ref 11.9–15.1)
IMMATURE GRANULOCYTES: 0 %
KEPPRA: 13 UG/ML
LYMPHOCYTES # BLD: 12 % (ref 24–43)
MAGNESIUM: 1.7 MG/DL (ref 1.6–2.6)
MCH RBC QN AUTO: 29.7 PG (ref 25.2–33.5)
MCHC RBC AUTO-ENTMCNC: 32.4 G/DL (ref 28.4–34.8)
MCV RBC AUTO: 91.8 FL (ref 82.6–102.9)
MONOCYTES # BLD: 2 % (ref 3–12)
NRBC AUTOMATED: 0 PER 100 WBC
PDW BLD-RTO: 16.4 % (ref 11.8–14.4)
PLATELET # BLD: 154 K/UL (ref 138–453)
PMV BLD AUTO: 9.7 FL (ref 8.1–13.5)
POTASSIUM SERPL-SCNC: 2.6 MMOL/L (ref 3.7–5.3)
POTASSIUM SERPL-SCNC: 3.1 MMOL/L (ref 3.7–5.3)
RBC # BLD: 4.27 M/UL (ref 3.95–5.11)
RBC # BLD: ABNORMAL 10*6/UL
SEG NEUTROPHILS: 85 % (ref 36–65)
SEGMENTED NEUTROPHILS ABSOLUTE COUNT: 7.18 K/UL (ref 1.5–8.1)
SODIUM BLD-SCNC: 139 MMOL/L (ref 135–144)
SODIUM BLD-SCNC: 141 MMOL/L (ref 135–144)
VANCOMYCIN RANDOM: 12.2 UG/ML
WBC # BLD: 8.4 K/UL (ref 3.5–11.3)

## 2022-10-05 PROCEDURE — 6370000000 HC RX 637 (ALT 250 FOR IP): Performed by: STUDENT IN AN ORGANIZED HEALTH CARE EDUCATION/TRAINING PROGRAM

## 2022-10-05 PROCEDURE — 83735 ASSAY OF MAGNESIUM: CPT

## 2022-10-05 PROCEDURE — 2580000003 HC RX 258: Performed by: STUDENT IN AN ORGANIZED HEALTH CARE EDUCATION/TRAINING PROGRAM

## 2022-10-05 PROCEDURE — 80051 ELECTROLYTE PANEL: CPT

## 2022-10-05 PROCEDURE — 6360000002 HC RX W HCPCS: Performed by: STUDENT IN AN ORGANIZED HEALTH CARE EDUCATION/TRAINING PROGRAM

## 2022-10-05 PROCEDURE — 6360000002 HC RX W HCPCS

## 2022-10-05 PROCEDURE — 6370000000 HC RX 637 (ALT 250 FOR IP)

## 2022-10-05 PROCEDURE — 85025 COMPLETE CBC W/AUTO DIFF WBC: CPT

## 2022-10-05 PROCEDURE — 2500000003 HC RX 250 WO HCPCS: Performed by: STUDENT IN AN ORGANIZED HEALTH CARE EDUCATION/TRAINING PROGRAM

## 2022-10-05 PROCEDURE — 95714 VEEG EA 12-26 HR UNMNTR: CPT

## 2022-10-05 PROCEDURE — 71045 X-RAY EXAM CHEST 1 VIEW: CPT

## 2022-10-05 PROCEDURE — 99291 CRITICAL CARE FIRST HOUR: CPT | Performed by: INTERNAL MEDICINE

## 2022-10-05 PROCEDURE — 36415 COLL VENOUS BLD VENIPUNCTURE: CPT

## 2022-10-05 PROCEDURE — 80177 DRUG SCRN QUAN LEVETIRACETAM: CPT

## 2022-10-05 PROCEDURE — 80048 BASIC METABOLIC PNL TOTAL CA: CPT

## 2022-10-05 PROCEDURE — 99233 SBSQ HOSP IP/OBS HIGH 50: CPT | Performed by: PSYCHIATRY & NEUROLOGY

## 2022-10-05 PROCEDURE — 95720 EEG PHY/QHP EA INCR W/VEEG: CPT | Performed by: PSYCHIATRY & NEUROLOGY

## 2022-10-05 PROCEDURE — 80202 ASSAY OF VANCOMYCIN: CPT

## 2022-10-05 PROCEDURE — 94640 AIRWAY INHALATION TREATMENT: CPT

## 2022-10-05 PROCEDURE — 99253 IP/OBS CNSLTJ NEW/EST LOW 45: CPT | Performed by: INTERNAL MEDICINE

## 2022-10-05 PROCEDURE — 2000000000 HC ICU R&B

## 2022-10-05 RX ORDER — LORAZEPAM 2 MG/ML
1 INJECTION INTRAMUSCULAR ONCE
Status: COMPLETED | OUTPATIENT
Start: 2022-10-05 | End: 2022-10-05

## 2022-10-05 RX ORDER — LEVETIRACETAM 500 MG/1
1000 TABLET ORAL 2 TIMES DAILY
Status: DISCONTINUED | OUTPATIENT
Start: 2022-10-05 | End: 2022-10-13

## 2022-10-05 RX ORDER — FAMOTIDINE 20 MG/1
20 TABLET, FILM COATED ORAL 2 TIMES DAILY
Status: DISCONTINUED | OUTPATIENT
Start: 2022-10-05 | End: 2022-10-11

## 2022-10-05 RX ORDER — LEVETIRACETAM 10 MG/ML
1000 INJECTION INTRAVASCULAR
Status: COMPLETED | OUTPATIENT
Start: 2022-10-05 | End: 2022-10-05

## 2022-10-05 RX ADMIN — MIDAZOLAM 1 MG: 1 INJECTION INTRAMUSCULAR; INTRAVENOUS at 05:15

## 2022-10-05 RX ADMIN — Medication 1000 MG: at 15:54

## 2022-10-05 RX ADMIN — Medication 750 MG: at 08:15

## 2022-10-05 RX ADMIN — APIXABAN 5 MG: 5 TABLET, FILM COATED ORAL at 20:19

## 2022-10-05 RX ADMIN — FAMOTIDINE 20 MG: 20 TABLET, FILM COATED ORAL at 20:20

## 2022-10-05 RX ADMIN — LEVETIRACETAM 1000 MG: 500 TABLET, FILM COATED ORAL at 20:18

## 2022-10-05 RX ADMIN — Medication 5 MG: at 20:19

## 2022-10-05 RX ADMIN — PIPERACILLIN AND TAZOBACTAM 3375 MG: 3; .375 INJECTION, POWDER, FOR SOLUTION INTRAVENOUS at 05:19

## 2022-10-05 RX ADMIN — SERTRALINE 100 MG: 50 TABLET, FILM COATED ORAL at 08:25

## 2022-10-05 RX ADMIN — ONDANSETRON 4 MG: 2 INJECTION INTRAMUSCULAR; INTRAVENOUS at 13:09

## 2022-10-05 RX ADMIN — POTASSIUM CHLORIDE 10 MEQ: 7.46 INJECTION, SOLUTION INTRAVENOUS at 06:12

## 2022-10-05 RX ADMIN — POTASSIUM CHLORIDE 10 MEQ: 7.46 INJECTION, SOLUTION INTRAVENOUS at 22:45

## 2022-10-05 RX ADMIN — MORPHINE SULFATE 2 MG: 2 INJECTION, SOLUTION INTRAMUSCULAR; INTRAVENOUS at 02:28

## 2022-10-05 RX ADMIN — PIPERACILLIN AND TAZOBACTAM 3375 MG: 3; .375 INJECTION, POWDER, FOR SOLUTION INTRAVENOUS at 21:09

## 2022-10-05 RX ADMIN — SODIUM CHLORIDE, PRESERVATIVE FREE 10 ML: 5 INJECTION INTRAVENOUS at 08:26

## 2022-10-05 RX ADMIN — POTASSIUM BICARBONATE 40 MEQ: 782 TABLET, EFFERVESCENT ORAL at 07:05

## 2022-10-05 RX ADMIN — ALBUTEROL SULFATE 2.5 MG: 5 SOLUTION RESPIRATORY (INHALATION) at 06:35

## 2022-10-05 RX ADMIN — POTASSIUM CHLORIDE 10 MEQ: 7.46 INJECTION, SOLUTION INTRAVENOUS at 11:47

## 2022-10-05 RX ADMIN — ACETAMINOPHEN 650 MG: 325 TABLET ORAL at 07:03

## 2022-10-05 RX ADMIN — LEVETIRACETAM 1000 MG: 10 INJECTION INTRAVENOUS at 10:29

## 2022-10-05 RX ADMIN — SODIUM CHLORIDE: 9 INJECTION, SOLUTION INTRAVENOUS at 06:16

## 2022-10-05 RX ADMIN — POTASSIUM CHLORIDE 10 MEQ: 7.46 INJECTION, SOLUTION INTRAVENOUS at 18:05

## 2022-10-05 RX ADMIN — POTASSIUM CHLORIDE 10 MEQ: 7.46 INJECTION, SOLUTION INTRAVENOUS at 20:16

## 2022-10-05 RX ADMIN — FUROSEMIDE 20 MG: 20 TABLET ORAL at 08:24

## 2022-10-05 RX ADMIN — ONDANSETRON 4 MG: 2 INJECTION INTRAMUSCULAR; INTRAVENOUS at 20:18

## 2022-10-05 RX ADMIN — POTASSIUM CHLORIDE 10 MEQ: 7.46 INJECTION, SOLUTION INTRAVENOUS at 09:25

## 2022-10-05 RX ADMIN — MIDAZOLAM 1 MG: 1 INJECTION INTRAMUSCULAR; INTRAVENOUS at 00:48

## 2022-10-05 RX ADMIN — PIPERACILLIN AND TAZOBACTAM 3375 MG: 3; .375 INJECTION, POWDER, FOR SOLUTION INTRAVENOUS at 13:08

## 2022-10-05 RX ADMIN — POTASSIUM CHLORIDE 10 MEQ: 7.46 INJECTION, SOLUTION INTRAVENOUS at 21:27

## 2022-10-05 RX ADMIN — ALBUTEROL SULFATE 2.5 MG: 5 SOLUTION RESPIRATORY (INHALATION) at 21:18

## 2022-10-05 RX ADMIN — POTASSIUM CHLORIDE 10 MEQ: 7.46 INJECTION, SOLUTION INTRAVENOUS at 08:15

## 2022-10-05 RX ADMIN — LAMOTRIGINE 125 MG: 100 TABLET ORAL at 20:19

## 2022-10-05 RX ADMIN — LEVETIRACETAM 1000 MG: 10 INJECTION INTRAVENOUS at 10:02

## 2022-10-05 RX ADMIN — APIXABAN 5 MG: 5 TABLET, FILM COATED ORAL at 08:24

## 2022-10-05 RX ADMIN — POTASSIUM CHLORIDE 20 MEQ: 29.8 INJECTION, SOLUTION INTRAVENOUS at 13:50

## 2022-10-05 RX ADMIN — LAMOTRIGINE 125 MG: 100 TABLET ORAL at 08:24

## 2022-10-05 RX ADMIN — LORAZEPAM 1 MG: 2 INJECTION INTRAMUSCULAR; INTRAVENOUS at 10:09

## 2022-10-05 RX ADMIN — SODIUM CHLORIDE, PRESERVATIVE FREE 20 MG: 5 INJECTION INTRAVENOUS at 08:24

## 2022-10-05 ASSESSMENT — ENCOUNTER SYMPTOMS: TACHYPNEA: 1

## 2022-10-05 ASSESSMENT — PAIN SCALES - GENERAL
PAINLEVEL_OUTOF10: 0
PAINLEVEL_OUTOF10: 0
PAINLEVEL_OUTOF10: 4
PAINLEVEL_OUTOF10: 0

## 2022-10-05 ASSESSMENT — PAIN DESCRIPTION - DESCRIPTORS: DESCRIPTORS: SORE

## 2022-10-05 ASSESSMENT — PAIN DESCRIPTION - LOCATION: LOCATION: GENERALIZED

## 2022-10-05 NOTE — TELEPHONE ENCOUNTER
MD RESPONDED TO MESSAGE AND ALSO SPOKE WITH MD, WHEN AT [facility] BRIEFLY, AND THEN NOTFIFIED LESLI, THAT DR MARK HAS NOT BEEN CONSULTED TO SEE PT AT [facility]. LESLI NOTIFIED OF ABOVE AND SHE STATES THAT SHE WILL REQUEST DR MARK TO SEE PT AT [facility], DTR STATES HAS NUMBER FOR [facility], [phone] SO SHE CAN ASK FOR UP-DATES FROM STAFF. LESLI STATES THAT HER SISTER IS RELAYING UP-DATES TO HER.

## 2022-10-05 NOTE — FLOWSHEET NOTE
LTME just finished being applied @ bedside, shift handoff @ bedside. Pt is pulling @ LTME, & PIV's, Sitting up in bed using side rails to pull & scoot self down to foot of bed. RN's explaining Reason for LTME, & safety about staying in bed to prevent falls. Pt is disoriented to place, time & situation. Non-cooperative. Day RN Willie reports pt's family were here previously but left for home. Critical care resident called to pt's bedside to evaluate pt status. HR is tachycardic, with pt being extremely anxious. See VS section. Order received for B/L SWR's for Non-violent Restraint due to Pulling lines, tubes, equipment. Pt has been given verbal redirection, emotional support, & RN has been constantly @ bedside, yet pt is unable to follow direction to be safe. Critical Care also reviews medication. Reason for restraints has been explained to pt who needs reinforcement. Charge RN updated, & pt will be 1:1 care for now.

## 2022-10-05 NOTE — PLAN OF CARE
Pt seen at bedside. LTME showing pt has had multiple seizures since last night. Loading with 2g Keppra and 1 mg ativan now. Changing maintenance dose of Keppra from 1g HS to 1g BID. Discussed with RN. Full note to follow.     Electronically signed by Sampson Hewitt,  on 10/5/2022 at 9:41 AM

## 2022-10-05 NOTE — PROGRESS NOTES
NEUROLOGY INPATIENT PROGRESS NOTE    10/5/2022         Subjective: Lev Harrington is a  59 y.o. female admitted on 10/3/2022 with Septicemia ([facility] [place] [address] [A41.9]  Altered mental status, unspecified altered mental status type [R41.82]  AMS (altered mental status) [R41.82]    Interval history: Today, pt was seen and examined. LTME reveals multiple seizures overnight. Per RN, pt seen to have \"staring off\" spells overnight but otherwise no witnessed seizure like activity. Pt remains confused this AM.     HPI:  The patient is a 59 y.o. female with significant past medical history of recurrent ovarian cancer (initial diagnosis in 2005, follows with Dr. Epifanio Lazo) with intra-abdominal mets and lymphedema, currently undergoing chemotherapy, recent active intra-abdominal bleeding due to splenic mass with GI infiltration, prior PRES with subsequent seizure disorder on keppra and lamictal who presents with acute onset altered mentation. Per daughter Kinsey Like, pt's other daughter noticed pt vomiting on her bed through camera in pt's room and pt thereafter reported to be incoherent and not answering questions appropriately, and behaving in an erratic way. Patient has chronic constipation. Per family, pt recently lost both of her sons and has missed the last few chemotherapy sessions 2/2 depression. Regarding seizure history,  Pt had new onset seizures (generalized tonic-clonic seizures witnessed at [address] [facility]) at age 60 in October 2020; at that time,  LTME showed multiple focal subclinical seizures starting at left posterior quadrant,  MRI brain showed flair hyperintensities in bilateral occipital regions, left parietal, and left frontal lobe with questionable restricted diffusion shaded with those lesions,  Seizures were felt to be 2/2 PRES likely 2/2 hypertensive spikes and underlying immunosuppression 2/2 pt's CA and associated treatments & pt was started on AED (Vimpat).      Since then, pt has had multiple hospitalizations for breakthrough seizures with workup negative for any intracerebral metastasis and with appropriate adjustments to AEDs made. Most recently, pt's medications adjusted to Keppra 1 g BID and Lamictal 125 mg BID. Previous Workup:  LTME 10/24/2020: multiple subclinical seizures originating over L posterior quadrant and spreading across L hemisphere, diffuse left hemispheric sharp wave discharges   LTME 01/04/2021: Diffuse polymorphic theta slowing and occasional left hemispheric superimposed delta slowing suggesting moderate encephalopathy and underlying neuronal dysfunction. No epileptiform activity. LTME 6/19/2021: Diffuse theta slowing, periods of diffuse attenuation, no epileptiform activity. LTME 2/23/2022: Diffuse theta slowing, and left occipital temporal lateralized periodic discharges with underlying rhythmicity suggesting increased risk for focal onset seizures  CSF analysis 10/2020: WBC, 0 RBC, total protein 25.4, glucose 78. Negative meningitis encephalitis panel. Negative cryptococcal antigen. MRI Brain w w/o contrast 2/24/2022: No evidence of metastatic disease, residual mild T2 hyperintensities in the occipital region bilaterally consistent with PRESS. Significant improvement compared to 10/2020  CT Head 10/3/2022: no acute intracranial abnormality  CTA H/N 10/3/2022: no evidence for sig stenosis or occlusion    No current facility-administered medications on file prior to encounter. Current Outpatient Medications on File Prior to Encounter   Medication Sig Dispense Refill    morphine (MS CONTIN) 15 MG extended release tablet TAKE 1 TABLET BY MOUTH 2 TIMES DAILY FOR 30 DAYS. 60 tablet 0    lamoTRIgine (LAMICTAL) 25 MG tablet Take 5 tablets by mouth 2 times daily 300 tablet 2    LORazepam (ATIVAN) 1 MG tablet TAKE 1 TABLET BY MOUTH EVERY 8 HOURS AS NEEDED FOR ANXIETY FOR UP TO 30 DAYS.  90 tablet 0    morphine (MS CONTIN) 30 MG extended release tablet TAKE 1 TABLET BY MOUTH 2 TIMES DAILY FOR 30 DAYS. 60 tablet 0    sertraline (ZOLOFT) 100 MG tablet TAKE 1 TABLET BY MOUTH DAILY 30 tablet 4    oxyCODONE-acetaminophen (PERCOCET) 5-325 MG per tablet Take 1 tablet by mouth every 4 hours as needed for Pain for up to 30 days. 180 tablet 0    FEROSUL 325 (65 Fe) MG tablet TAKE 1 TABLET BY MOUTH ONCE DAILY WITH BREAKFAST 30 tablet 2    levETIRAcetam (KEPPRA) 1000 MG tablet TAKE 1 TABLET (1,000 MG TOTAL) BY MOUTH IN THE MORNING AND 1 TABLET (1,000 MG TOTAL) BEFORE BEDTIME. 60 tablet 3    FEROSUL 325 (65 Fe) MG tablet TAKE 1 TABLET (325 MG TOTAL) BY MOUTH DAILY WITH BREAKFAST. 30 tablet 2    pantoprazole (PROTONIX) 40 MG tablet Take 1 tablet by mouth daily 30 tablet 3    Lactobacillus (ACIDOPHILUS) CAPS capsule TAKE 1 TABLET BY MOUTH ONCE DAILY IN THE MORNING AND 1 TABLET BEFORE BEDTIME 60 capsule 5    furosemide (LASIX) 20 MG tablet TAKE 1 TABLET (20 MG TOTAL) BY MOUTH DAILY. 30 tablet 3    dicyclomine (BENTYL) 20 MG tablet TAKE 1 TABLET (20 MG TOTAL) BY MOUTH IN THE MORNING AND 1 TABLET (20 MG TOTAL) BEFORE BEDTIME. 60 tablet 3    potassium chloride (MICRO-K) 10 MEQ extended release capsule TAKE 1 CAPSULE (10 MEQ TOTAL) BY MOUTH IN THE MORNING.  30 capsule 3    Handicap Placard MISC 5 years 1 each 0    loperamide (IMODIUM) 2 MG capsule Take 2 mg by mouth 4 times daily as needed for Diarrhea      ondansetron (ZOFRAN-ODT) 4 MG disintegrating tablet Take 1 tablet by mouth every 8 hours as needed for Nausea or Vomiting 60 tablet 2    senna (SENOKOT) 8.6 MG tablet Take 1 tablet by mouth 2 times daily 60 tablet 11    melatonin 5 MG TABS tablet Take 1 tablet by mouth daily 30 tablet 3    benzonatate (TESSALON PERLES) 100 MG capsule Take 1 capsule by mouth 3 times daily as needed for Cough 30 capsule 1    apixaban (ELIQUIS) 5 MG TABS tablet Take 1 tablet by mouth 2 times daily 60 tablet 5    hydrocortisone (ANUSOL-HC) 2.5 % CREA rectal cream Apply on affected area twice daily 1 each 1       Allergies: Jim Mckeon is allergic to carboplatin and ceftriaxone. Past Medical History:   Diagnosis Date    Anemia     Bleeding 10/2020    intra-abdominal bleeding -due to splenic mass with GI infiltration. Status post embolization    Cervical cancer (HCC)     Depression     Diabetes mellitus ([address]     GERD (gastroesophageal reflux disease)     Hx of blood clots     Hypertension     Metastatic cancer ([facility] [address] 10/2020    extensive intraabdominal and splenic involvement and lung mets. Ovarian cancer ([facility] [address]     low grade serous ovarian carcinoma    Post chemo evaluation     2007: Chemo via med onc (Dr. Clark Diss), 2008: Lyly Show due to rising CA-125, 2013: intraperitoneal chemo,12/2015: Ca125 - 25     PRES (posterior reversible encephalopathy syndrome)     Splenic lesion        Past Surgical History:   Procedure Laterality Date    ABSCESS DRAINAGE  2013    Franca rectal    ANUS SURGERY      ANAL FISSURECTOMY    CARDIAC CATHETERIZATION      COLECTOMY  03/2013    ex lap, tumor debulking, transverse colectomy w reanastamosis, subgastric omentectomy, intraperitoneal port placement    HYSTERECTOMY, TOTAL ABDOMINAL (CERVIX REMOVED)      IR EMBOLIZATION HEMORRHAGE  10/05/2020    intra-abdominal bleeding -due to splenic mass with GI infiltration. Status post embolization [employer] interlock coils x7. mri condtional 3t ok, safe immediately post implant.     IR PORT PLACEMENT EQUAL OR GREATER THAN 5 YEARS  08/24/2020    IR PORT PLACEMENT EQUAL OR GREATER THAN 5 YEARS [facility]24/2020 [facility]    PORT SURGERY      IP Port    TONSILLECTOMY         Medications:     levETIRAcetam  1,000 mg Oral BID    famotidine  20 mg Oral BID    docusate  100 mg Oral Daily    apixaban  5 mg Oral BID    furosemide  20 mg Oral Daily    lamoTRIgine  125 mg Oral BID    sertraline  100 mg Oral Daily    melatonin  5 mg Oral Nightly    sodium chloride flush  5-40 mL IntraVENous 2 times per day    piperacillin-tazobactam  3,375 mg IntraVENous Q8H    vancomycin (VANCOCIN) intermittent dosing (placeholder)   Other RX Placeholder    vancomycin  750 mg IntraVENous Q8H     PRN Meds include: oxyCODONE-acetaminophen, LORazepam, morphine, albuterol, midazolam, racepinephrine HCl, sodium chloride nebulizer, sodium chloride flush, sodium chloride, ondansetron **OR** ondansetron, polyethylene glycol, acetaminophen **OR** acetaminophen, potassium chloride **OR** potassium chloride    Objective:   BP (!) 152/91   Pulse 96   Temp 97.9 °F (36.6 °C) (Oral)   Resp 23   Ht 5' 5\" (1.651 m)   Wt 153 lb 14.1 oz (69.8 kg)   SpO2 99%   BMI 25.61 kg/m²     Blood pressure range: Systolic (05PZG), FVF:472 , Min:139 , GOD:610   ; Diastolic (20BIP), BZD:88, Min:65, Max:106    Vitals:    10/05/22 1100 10/05/22 1200 10/05/22 1300 10/05/22 1400   BP: (!) 145/88 (!) 155/82 (!) 140/73 (!) 152/91   Pulse: 90 (!) 114 96 96   Resp: 21 16 22 23   Temp:  98.2 °F (36.8 °C)  97.9 °F (36.6 °C)   TempSrc:  Oral  Oral   SpO2: 99% 98%  99%   Weight:       Height:             ROS:    Review of Systems   Unable to perform ROS: Mental status change       NEUROLOGIC EXAMINATION    MENTAL STATUS:  Alert, disoriented, confused, gives simple repetitive 1 word answers, no dysarthria, does not follow commands if commands are given verbally but does follow commands by copying gestures when prompted   CRANIAL NERVES: II     -      Unable to test visual fields (pt just says yes to everything)  III,IV,VI -  PERR, no ptosis  V     -     Unable to assess  VII    -     Normal facial symmetry  VIII   -     Intact hearing to conversational tone  IX,X -     Symmetrical palate  XII   -     Midline tongue, no atrophy    MOTOR FUNCTION: Unable to assess strength grade due to mental status but pt is moving all extremities purposefully on own.      Normal bulk, normal tone and no involuntary movements while at bedside but some possibly involuntary hand gestures seen on LTME video   CEREBELLAR FUNCTION: Unable to test   REFLEX FUNCTION: Deferred due to patient actively on side having BM's and being changed/cleaned by nurses during encounter   STATION and GAIT Not tested     Data:    Lab Results:   CBC:   Recent Labs     10/03/22  1339 10/04/22  0546 10/05/22  0500   WBC 10.2 6.9 8.4   HGB 14.2 11.5* 12.7    174 154     BMP:    Recent Labs     10/03/22  1341 10/04/22  0546 10/05/22  0500    134* 141   K 4.2 3.6* 2.6*    104 101   CO2 26 21 26   BUN 8 6 3*   CREATININE 0.42* 0.28* 0.35*   GLUCOSE 158* 123* 106*         Lab Results   Component Value Date    CHOL 131 03/10/2021    LDLCHOLESTEROL 69 03/10/2021    HDL 33 (L) 03/10/2021    TRIG 147 03/10/2021    ALT 9 10/03/2022    AST 18 10/03/2022    TSH 2.93 03/09/2021    INR 1.2 05/03/2022    LABA1C 5.2 04/13/2022    AGDRLIUC24 654 06/04/2022       No results found for: PHENYTOIN, PHENYTOIN, VALPROATE, CBMZ    IMAGING  CT HEAD WO CONTRAST    Result Date: 10/3/2022  1. No acute intracranial abnormality. 2. No evidence for significant stenosis or occlusion. 3. Consolidation in the visualized lungs bilaterally that is worse on the right compared to the left. Correlate with report from dedicated CT chest, abdomen, and pelvis for further discussion. XR CHEST PORTABLE    Result Date: 10/5/2022  Interval extubation. Interstitial opacities have increased in the interval, for which developing edema should be considered. XR CHEST PORTABLE    Result Date: 10/3/2022  1. Endotracheal tube tip lies 5 cm above the alejandro. 2. Enteric tube in the stomach with the tip at the level of the proximal to mid gastric body. 3. Stable right mid I a frame elevation resulting in right hemithorax volume loss. 4. There is progressive curvilinear right lung base opacity which may represent progressive atelectasis. 5. Stable perihilar interstitial reticulonodular densities. No acute consolidation or pulmonary edema.      CTA HEAD NECK W CONTRAST    Result Date: 10/3/2022  1. No acute intracranial abnormality. 2. No evidence for significant stenosis or occlusion. 3. Consolidation in the visualized lungs bilaterally that is worse on the right compared to the left. Correlate with report from dedicated CT chest, abdomen, and pelvis for further discussion. CT CHEST ABDOMEN PELVIS W CONTRAST    Result Date: 10/3/2022  1. Progressive bibasilar and left lower lobe consolidative change which may represent atelectasis versus superimposed pneumonia. 2. Redemonstration of thoracic and abdominal metastatic disease with mixed change. Most lesions appear stable with a few interval progression (right axillary metastatic lymphadenopathy. Retroperitoneal left periaortic metastatic lymphadenopathy, pelvic presacral cystic mass). 3. Redemonstration of peritoneal carcinomatosis. No significant ascites. 4. Stable blastic skeletal metastasis. 5. Moderate transverse and descending colonic distention with liquid stool. Abrupt transition in the mid sigmoid colon which may relate to malignant partial obstruction or stricture. Mild interval improvement of colonic edema. No evidence of colonic perforation or pneumatosis. MRI BRAIN WO CONTRAST    Result Date: 10/4/2022  1. New subtle focus of T2/FLAIR hyperintensity in the lateral left frontal cortex and subcortical white matter is nonspecific and may reflect sequela of prior ischemia or mild posterior reversal encephalopathy syndrome. Short interval follow-up is recommended. 2. No evidence of acute infarct or intracranial hemorrhage. 3. Stable small foci of right occipital encephalomalacia in keeping with sequela of prior insult.          Assessment and Plan  Case of a 59 y.o. female with significant past medical history of recurrent ovarian cancer (initial diagnosis in 2005, follows with Dr. Shiva Kraus) with peritoneal and lung metastasis, lymphedema, currently undergoing chemotherapy, recent active intra-abdominal bleeding due to splenic mass with GI infiltration, prior PRES with subsequent seizure disorder (onset 10/2020) on keppra and lamictal who is admitted for management of acute onset altered mentation initially requiring intubation for airway protection. Pt was febrile and with GCS 8 on initial presentation. Pt initially VDRF; s/p extubation on 10/04 & now on 2-4 L O2 via NC. Remains disoriented and confused but no obvious motor deficits on exam.   - CT Head negative for acute intracranial abnormality.   - CTA H/N negative for stenosis or occlusion.  - MRI brain wo contrast showing new nonspecific left frontal hyperintensity & stable small foci of right occipital encephalomalacia  - LTM EEG showin subclinical seizures over 24 hours; left hand flapping and left head deviation noted during 4 electroclinical seizures  diffuse polymorphic theta slowing suggestive of mild to moderate encephalopathy  continuous left hemispheric slowing suggested underlying structural defect  Central parietal sharp waves and lateralized periodic discharges conferring increased risk for focal onset seizures      Impression is breakthrough seizures with associate acute encephalopathy 2/2 post and interictal state in setting of multiple subclinical seizures. Etiology of breakthrough seizures remains unclear, current concern is possible PRES vs intracranial mets. MRI brain wo contrast has new hyper intensities but imaging is motion restricted so new findings difficult to interpret. Will repeat MRI with contrast to help clarify likely etiology. Load with 2g Keppra and 1 mg ativan. Increase maintenance dose of Keppra from 1g HS (home dose) to 1g BID. Continue current Lamictal dose. Continue with LTM EEG. Pt may need further adjustments to AED/possible 3rd agent added if continues to have electrographic seizures.   Writer called daughter Juan Barber at 2:24PM but no update able to be given to family as call was not answered; writer left voicemail.     Prasanna Leon DO  Neurology Resident PGY-2  10/5/2022  2:21 PM

## 2022-10-05 NOTE — CARE COORDINATION
[facility] ICU Quality Flow/Interdisciplinary Rounds Progress Note    Quality Flow Rounds held on October 5, 2022 at [address] Attending:  Bedside Nurse and     Anticipated Discharge Date:       Anticipated Discharge Disposition:    Readmission Risk              Risk of Unplanned Readmission:  35           Discussed patient goal for the day, patient clinical progression, and barriers to discharge.   The following Goal(s) of the Day/Commitment(s) have been identified:  Activity Progression  as tolerated and now extubated -  EEG/LTME continues      Monica Raya RN  October 5, 2022

## 2022-10-05 NOTE — TELEPHONE ENCOUNTER
Patient to be transported by Min Owens on 10/20 at 9:00am. Upon chart review, noted that patient is currently admitted.  updated Nurse Navigator and .

## 2022-10-05 NOTE — PLAN OF CARE
Problem: Respiratory - Adult  Goal: Achieves optimal ventilation and oxygenation  Outcome: Progressing     Problem: Discharge Planning  Goal: Discharge to home or other facility with appropriate resources  Outcome: Progressing     Problem: Safety - Adult  Goal: Free from fall injury  Outcome: Progressing     Problem: ABCDS Injury Assessment  Goal: Absence of physical injury  Outcome: Progressing     Problem: Skin/Tissue Integrity  Goal: Absence of new skin breakdown  Description: 1. Monitor for areas of redness and/or skin breakdown  2. Assess vascular access sites hourly  3. Every 4-6 hours minimum:  Change oxygen saturation probe site  4. Every 4-6 hours:  If on nasal continuous positive airway pressure, respiratory therapy assess nares and determine need for appliance change or resting period. Outcome: Progressing     Problem: Pain  Goal: Verbalizes/displays adequate comfort level or baseline comfort level  Outcome: Progressing     Problem: Chronic Conditions and Co-morbidities  Goal: Patient's chronic conditions and co-morbidity symptoms are monitored and maintained or improved  Outcome: Progressing     Problem: Safety - Medical Restraint  Goal: Remains free of injury from restraints (Restraint for Interference with Medical Device)  Description: INTERVENTIONS:  1. Determine that other, less restrictive measures have been tried or would not be effective before applying the restraint  2. Evaluate the patient's condition at the time of restraint application  3. Inform patient/family regarding the reason for restraint  4.  Q2H: Monitor safety, psychosocial status, comfort, nutrition and hydration  Outcome: Progressing  Flowsheets  Taken 10/5/2022 1800 by Karen Gtz RN  Remains free of injury from restraints (restraint for interference with medical device):   Every 2 hours: Monitor safety, psychosocial status, comfort, nutrition and hydration   Inform patient/family regarding the reason for restraint Evaluate the patient's condition at the time of restraint application   Determine that other, less restrictive measures have been tried or would not be effective before applying the restraint  Taken 10/5/2022 1600 by New Cabrera RN  Remains free of injury from restraints (restraint for interference with medical device):   Determine that other, less restrictive measures have been tried or would not be effective before applying the restraint   Evaluate the patient's condition at the time of restraint application   Inform patient/family regarding the reason for restraint   Every 2 hours: Monitor safety, psychosocial status, comfort, nutrition and hydration  Taken 10/5/2022 1400 by New Cabrera RN  Remains free of injury from restraints (restraint for interference with medical device):   Evaluate the patient's condition at the time of restraint application   Determine that other, less restrictive measures have been tried or would not be effective before applying the restraint   Inform patient/family regarding the reason for restraint   Every 2 hours: Monitor safety, psychosocial status, comfort, nutrition and hydration  Taken 10/5/2022 1200 by New Cabrera RN  Remains free of injury from restraints (restraint for interference with medical device):   Determine that other, less restrictive measures have been tried or would not be effective before applying the restraint   Evaluate the patient's condition at the time of restraint application   Inform patient/family regarding the reason for restraint   Every 2 hours: Monitor safety, psychosocial status, comfort, nutrition and hydration  Taken 10/5/2022 1000 by New Cabrera RN  Remains free of injury from restraints (restraint for interference with medical device):   Determine that other, less restrictive measures have been tried or would not be effective before applying the restraint   Evaluate the patient's condition at the time of restraint application Inform patient/family regarding the reason for restraint   Every 2 hours: Monitor safety, psychosocial status, comfort, nutrition and hydration  Taken 10/5/2022 0800 by Vanessa Bardales RN  Remains free of injury from restraints (restraint for interference with medical device):    Inform patient/family regarding the reason for restraint   Determine that other, less restrictive measures have been tried or would not be effective before applying the restraint   Evaluate the patient's condition at the time of restraint application   Every 2 hours: Monitor safety, psychosocial status, comfort, nutrition and hydration  Taken 10/5/2022 0600 by Lisa Geronimo RN  Remains free of injury from restraints (restraint for interference with medical device): Determine that other, less restrictive measures have been tried or would not be effective before applying the restraint

## 2022-10-05 NOTE — PLAN OF CARE
Problem: Respiratory - Adult  Goal: Achieves optimal ventilation and oxygenation  10/5/2022 0044 by Jeannette Carmona RN  Outcome: Progressing  10/4/2022 1959 by Jeannette Carmona RN  Outcome: Progressing     Problem: Discharge Planning  Goal: Discharge to home or other facility with appropriate resources  10/5/2022 0044 by Jeannette Carmona RN  Outcome: Progressing  10/4/2022 1959 by Jeannette Carmona RN  Outcome: Progressing     Problem: Safety - Adult  Goal: Free from fall injury  10/5/2022 0044 by Jeannette Carmona RN  Outcome: Progressing  10/4/2022 1959 by Jeannette Carmona RN  Outcome: Progressing     Problem: ABCDS Injury Assessment  Goal: Absence of physical injury  10/5/2022 0044 by Jeannette Carmona RN  Outcome: Progressing  10/4/2022 1959 by Jeannette Carmona RN  Outcome: Progressing     Problem: Skin/Tissue Integrity  Goal: Absence of new skin breakdown  Description: 1. Monitor for areas of redness and/or skin breakdown  2. Assess vascular access sites hourly  3. Every 4-6 hours minimum:  Change oxygen saturation probe site  4. Every 4-6 hours:  If on nasal continuous positive airway pressure, respiratory therapy assess nares and determine need for appliance change or resting period.   10/5/2022 0044 by Jeannette Carmona RN  Outcome: Progressing  10/4/2022 1959 by Jeannette Carmona RN  Outcome: Progressing     Problem: Pain  Goal: Verbalizes/displays adequate comfort level or baseline comfort level  10/5/2022 0044 by Jeannette Carmona RN  Outcome: Progressing  10/4/2022 1959 by Jeannette Carmona RN  Outcome: Progressing     Problem: Chronic Conditions and Co-morbidities  Goal: Patient's chronic conditions and co-morbidity symptoms are monitored and maintained or improved  10/5/2022 0044 by Jeannette Carmona RN  Outcome: Progressing  10/4/2022 1959 by Jeannette Carmona RN  Outcome: Progressing     Problem: Safety - Medical Restraint  Goal: Remains free of injury from restraints (Restraint for Interference with Medical Device)  Description: INTERVENTIONS:  1. Determine that other, less restrictive measures have been tried or would not be effective before applying the restraint  2. Evaluate the patient's condition at the time of restraint application  3. Inform patient/family regarding the reason for restraint  4.  Q2H: Monitor safety, psychosocial status, comfort, nutrition and hydration  10/5/2022 0044 by Tracie Fleming RN  Outcome: Progressing  Flowsheets  Taken 10/5/2022 0000  Remains free of injury from restraints (restraint for interference with medical device):   Determine that other, less restrictive measures have been tried or would not be effective before applying the restraint   Inform patient/family regarding the reason for restraint   Every 2 hours: Monitor safety, psychosocial status, comfort, nutrition and hydration  Taken 10/4/2022 2200  Remains free of injury from restraints (restraint for interference with medical device):   Determine that other, less restrictive measures have been tried or would not be effective before applying the restraint   Inform patient/family regarding the reason for restraint   Every 2 hours: Monitor safety, psychosocial status, comfort, nutrition and hydration  Taken 10/4/2022 2000  Remains free of injury from restraints (restraint for interference with medical device):   Determine that other, less restrictive measures have been tried or would not be effective before applying the restraint   Evaluate the patient's condition at the time of restraint application   Every 2 hours: Monitor safety, psychosocial status, comfort, nutrition and hydration  10/4/2022 1959 by Tracie Fleming RN  Outcome: Progressing  Flowsheets  Taken 10/4/2022 1925 by Tracie Fleming RN  Remains free of injury from restraints (restraint for interference with medical device):   Determine that other, less restrictive measures have been tried or would not be effective before applying the restraint   Evaluate the patient's condition at the time of restraint application   Inform patient/family regarding the reason for restraint   Every 2 hours: Monitor safety, psychosocial status, comfort, nutrition and hydration  Taken 10/4/2022 1900 by Nina Krishnamurthy RN  Remains free of injury from restraints (restraint for interference with medical device):   Determine that other, less restrictive measures have been tried or would not be effective before applying the restraint   Evaluate the patient's condition at the time of restraint application   Inform patient/family regarding the reason for restraint  Taken 10/4/2022 1200 by Nina Krishnamurthy RN  Remains free of injury from restraints (restraint for interference with medical device): Every 2 hours: Monitor safety, psychosocial status, comfort, nutrition and hydration  Taken 10/4/2022 1000 by Nina Krishnamurthy RN  Remains free of injury from restraints (restraint for interference with medical device): Every 2 hours: Monitor safety, psychosocial status, comfort, nutrition and hydration  Taken 10/4/2022 0800 by Nina Krishnamurthy RN  Remains free of injury from restraints (restraint for interference with medical device): Every 2 hours: Monitor safety, psychosocial status, comfort, nutrition and hydration  Taken 10/4/2022 0600 by Lisa Shirley RN  Remains free of injury from restraints (restraint for interference with medical device):   Determine that other, less restrictive measures have been tried or would not be effective before applying the restraint   Inform patient/family regarding the reason for restraint   Every 2 hours: Monitor safety, psychosocial status, comfort, nutrition and hydration

## 2022-10-05 NOTE — CARE COORDINATION
Reviewed chart  Noted pt is extubated. Attempted to see pt this a.m., however per RN pt is confused, disoriented,had multiple seizures. SW will continue to follow and monitor pt progress. When pt is able to participate will complete assessment. Noted Palliative care and Spiritual care following also.

## 2022-10-05 NOTE — PROCEDURES
LONG-TERM EEG-VIDEO [id number] [address]    Patient: Treva Gautam  Age: 59 y.o. MRN: [id number]    Referring Physician: No ref. provider found  History: The patient is a 59 y.o. female who presented breakthrough seizure/encephalopathy. This long-term video-EEG monitoring study was performed to determine the nature of the patient's clinical events. The patient is on neuroactive medications.    Treva Garcia   Current Facility-Administered Medications   Medication Dose Route Frequency Provider Last Rate Last Admin    docusate (COLACE) 50 MG/5ML liquid 100 mg  100 mg Oral Daily Lizzie Sherwood MD   100 mg at 10/04/22 0827    apixaban (ELIQUIS) tablet 5 mg  5 mg Oral BID Vivian Knock, DO   5 mg at 10/05/22 3411    furosemide (LASIX) tablet 20 mg  20 mg Oral Daily Vivian Knock, DO   20 mg at 10/05/22 3552    lamoTRIgine (LAMICTAL) tablet 125 mg  125 mg Oral BID Vivian Knock, DO   125 mg at 10/05/22 0824    levETIRAcetam (KEPPRA) tablet 1,000 mg  1,000 mg Oral QPM Vivian Knock, DO   1,000 mg at 10/04/22 1759    oxyCODONE-acetaminophen (PERCOCET) 5-325 MG per tablet 1 tablet  1 tablet Oral Q4H PRN Vivian Knock, DO        sertraline (ZOLOFT) tablet 100 mg  100 mg Oral Daily Vivian Knock, DO   100 mg at 10/05/22 0825    melatonin tablet 5 mg  5 mg Oral Nightly Vivian Knock, DO   5 mg at 10/04/22 2030    LORazepam (ATIVAN) injection 0.5 mg  0.5 mg IntraVENous Once PRN Latonia Pozo MD        morphine (PF) injection 2 mg  2 mg IntraVENous Q4H PRN Vivian Knock, DO   2 mg at 10/05/22 0228    albuterol (PROVENTIL) nebulizer solution 2.5 mg  2.5 mg Nebulization Q6H PRN Zen Syed MD   2.5 mg at 10/05/22 0635    midazolam PF (VERSED) injection 1 mg  1 mg IntraVENous Q2H PRN Zen Syed MD   1 mg at 10/05/22 0515    racepinephrine HCl (VAPONEFPRIN) 2.25 % nebulizer solution NEBU 11.25 mg  11.25 mg Nebulization Q3H PRN Yarely Patterson MD        sodium chloride nebulizer 0.9 % solution 3 mL  3 mL Nebulization Q4H PRN Yarely Patterson MD        sodium chloride flush 0.9 % injection 5-40 mL  5-40 mL IntraVENous 2 times per day Vincenzo Kotyk, DO   10 mL at 10/05/22 0826    sodium chloride flush 0.9 % injection 5-40 mL  5-40 mL IntraVENous PRN Vincenzo Kotyk, DO        0.9 % sodium chloride infusion   IntraVENous PRN Vincenzo Kotyk, DO   Stopped at 10/04/22 0056    ondansetron (ZOFRAN-ODT) disintegrating tablet 4 mg  4 mg Oral Q8H PRN Vincenzo Kotyk, DO        Or    ondansetron [facility]) injection 4 mg  4 mg IntraVENous Q6H PRN Vincenzo Kotyk, DO   4 mg at 10/04/22 1952    polyethylene glycol (GLYCOLAX) packet 17 g  17 g Oral Daily PRN Vincenzo Kotyk, DO        acetaminophen (TYLENOL) tablet 650 mg  650 mg Oral Q6H PRN Vincenzo Kotyk, DO   650 mg at 10/05/22 0703    Or    acetaminophen (TYLENOL) suppository 650 mg  650 mg Rectal Q6H PRN Vincenzo Kotyk, DO        0.9 % sodium chloride infusion   IntraVENous Continuous Vincenzo Kotyk,  mL/hr at 10/05/22 0616 New Bag at 10/05/22 0616    potassium chloride 20 mEq/50 mL IVPB (Central Line)  20 mEq IntraVENous PRN Vincenzo Kotyk, DO        Or    potassium chloride 10 mEq/100 mL IVPB (Peripheral Line)  10 mEq IntraVENous PRN Vincenzo Kotyk,  mL/hr at 10/05/22 0815 10 mEq at 10/05/22 0815    famotidine (PEPCID) 20 mg in sodium chloride (PF) 10 mL injection  20 mg IntraVENous BID Vincenzo Kotyk, DO   20 mg at 10/05/22 0824    piperacillin-tazobactam (ZOSYN) 3,375 mg in dextrose 5 % 50 mL IVPB extended infusion (mini-bag)  3,375 mg IntraVENous Q8H Vincenzo Kotyk, DO 12.5 mL/hr at 10/05/22 0519 3,375 mg at 10/05/22 0519    vancomycin (VANCOCIN) intermittent dosing (placeholder)   Other RX Placeholder Vincenzo Venkateshtyk, DO   Given at 10/03/22 1900    vancomycin (VANCOCIN) 750 mg in sodium chloride 0.9 % 250 mL IVPB  750 mg IntraVENous Q8H Vincenzo Venkateshtyk,  mL/hr at 10/05/22 0815 750 mg at 10/05/22 0815     Technical Description: This is a 21-channel digital EEG recording with time-locked video. Electrodes were placed in accordance with the 10-20 International System of Electrode Placement. Single lead EKG monitoring was included. Baseline EEG Recording:  A formal baseline EEG recording was not obtained. Day 1 - 10/4/22, starting at 18:32    Interictal EEG Samples: The background activity over the right hemisphere consisted of 3 to 7 Hz of polymorphic theta activity of 20-30 µV. The activity over the left hemisphere was relatively less well-modulated and consisted of 4 to 5 Hz of polymorphic delta and theta slowing. This asymmetry between the 2 hemispheres was continuous. Frequent left central parietal sharp waves were seen, at times occurring in periodic pattern, consistent with lateralized periodic discharges. The EKG channel revealed no abnormalities. Ictal EEG Recording / Patient Events: At 20 1:37 PM, there is a burst of rhythmic 3 to 4 Hz of delta activity maximal over the left hemisphere, which then gradually increased in amplitude, became more rhythmic and involve the right hemisphere, ending at 9:38 PM.    Summary: During the recording, the patient had 1 subclinical seizure lasting about 40 seconds originating from the left hemisphere. The interictal EEG was abnormal due to diffuse polymorphic theta slowing suggesting mild to moderate encephalopathy. Continuous left hemispheric slowing suggested underlying structural defect. Frequent left central parietal sharp waves and lateralized periodic discharges conferred an increased risk for focal onset seizures. Monitoring was continued in order to record the patient's typical events. The EKG channel revealed no abnormalities. Day 2 - 10/5/22, reviewed through 7:30 am    Interictal EEG Samples: Interictal EEG was unchanged from yesterday.     Ictal EEG Recording / Patient Events: During this period the patient had total of 5 seizures at 2:25 AM, 3 AM, 3:27 AM, 4:02 AM, 4:51 AM and 7:38 AM.  During some of the seizures, the patient noted to have left hand flapping movement and at times having left head version    Summary: During the recording, the patient had 4 electroclinical seizures with left hand flapping and left head deviation. Electrographically, these seizures were similar to yesterday seizures. The interictal EEG was abnormal due to diffuse polymorphic theta slowing suggesting mild to moderate encephalopathy. Continuous left hemispheric slowing suggested underlying structural defect. Frequent left central parietal sharp waves and lateralized periodic discharges (LPDs) conferred an increased risk for focal onset seizures. Monitoring was continued in order to record the patient's typical events. The EKG channel revealed no abnormalities. Lizeth Soni MD  Diplomate, American Board of Psychiatry and Neurology  Diplomate, American Board of Clinical Neurophysiology  Diplomate, American Board of Epilepsy       Please note this is a preliminary report and updated daily. The final report will have a summary of behavior and electrographic findings with clinical correlation.

## 2022-10-05 NOTE — CONSULTS
[facility] CONSULT    Patient:   Tyra Weeks   :    1965   Facility:   [facility]   Date:    10/5/2022  Admission Dx:  Septicemia ([facility] [address] [A41.9]  Altered mental status, unspecified altered mental status type [R41.82]  AMS (altered mental status) [R41.82]  Requesting physician: Noreen Mitchell MD  Reason for consult:  Metastatic partial SBO, surgery requesting dilation    SUBJECTIVE     HISTORY OF PRESENT ILLNESS  This is a 59 y.o.  female who was admitted 10/3/2022 with Septicemia ([facility] [address] [A41.9]  Altered mental status, unspecified altered mental status type [R41.82]  AMS (altered mental status) [R41.82]. We have been asked to see the patient in consultation by Noreen Mitchell MD for static partial SBO, surgery requesting dilation. 78-year-old female with PMH of multiple comorbidities including recurrent ovarian cancer with intraperitoneal carcinomatosis now with extensive intra-abdominal and pelvic involvement as well as lung mets who presented to the hospital with weakness and low blood sugar. Currently she is getting ongoing EEG for seizures. She had a CT of the chest abdomen and pelvis which showed diffuse metastatic disease from her ovarian cancer as well as peritoneal carcinomatosis. She had moderate transverse and descending colon distention with liquid stool and transition at sigmoid colon for which GI was consulted. She is having multiple bowel movements during the hospital stay. She has mild diffuse abdominal tenderness. OBJECTIVE:     PAST MEDICAL/SURGICAL HISTORY  Past Medical History:   Diagnosis Date    Anemia     Bleeding 10/2020    intra-abdominal bleeding -due to splenic mass with GI infiltration.   Status post embolization    Cervical cancer (HCC)     Depression     Diabetes mellitus (HCC)     GERD (gastroesophageal reflux disease)     Hx of blood clots     Hypertension     Metastatic cancer ([facility] [address] 10/2020    extensive intraabdominal and splenic involvement and lung mets. Ovarian cancer ([facility] [address]     low grade serous ovarian carcinoma    Post chemo evaluation     2007: Chemo via med onc (Dr. Damir Kelly), 2008: Jayant James due to rising CA-125, 2013: intraperitoneal chemo,12/2015: Ca125 - 25     PRES (posterior reversible encephalopathy syndrome)     Splenic lesion      Past Surgical History:   Procedure Laterality Date    ABSCESS DRAINAGE  2013    Franca rectal    ANUS SURGERY      ANAL FISSURECTOMY    CARDIAC CATHETERIZATION      COLECTOMY  03/2013    ex lap, tumor debulking, transverse colectomy w reanastamosis, subgastric omentectomy, intraperitoneal port placement    HYSTERECTOMY, TOTAL ABDOMINAL (CERVIX REMOVED)      IR EMBOLIZATION HEMORRHAGE  10/05/2020    intra-abdominal bleeding -due to splenic mass with GI infiltration. Status post embolization [employer] interlock coils x7. mri condtional 3t ok, safe immediately post implant. IR PORT PLACEMENT EQUAL OR GREATER THAN 5 YEARS  08/24/2020    IR PORT PLACEMENT EQUAL OR GREATER THAN 5 YEARS 8/24/2020 Humera Zamora MD [facility] SPECIAL PROCEDURES    PORT SURGERY      IP Port    TONSILLECTOMY         ALLERGIES:  Allergies   Allergen Reactions    Carboplatin Anaphylaxis    Ceftriaxone Rash     Had a rash on ceftriaxone December 2020, [address]:  Prior to Admission medications    Medication Sig Start Date End Date Taking? Authorizing Provider   morphine (MS CONTIN) 15 MG extended release tablet TAKE 1 TABLET BY MOUTH 2 TIMES DAILY FOR 30 DAYS.  9/28/22 10/28/22  Lexus Meneses MD   lamoTRIgine (LAMICTAL) 25 MG tablet Take 5 tablets by mouth 2 times daily 9/22/22   Shanda Hinds DO   LORazepam (ATIVAN) 1 MG tablet TAKE 1 TABLET BY MOUTH EVERY 8 HOURS AS NEEDED FOR ANXIETY FOR UP TO 30 DAYS. 9/21/22 10/21/22  Jacquie Bhatia MD   morphine (MS CONTIN) 30 MG extended release tablet TAKE 1 TABLET BY MOUTH 2 TIMES DAILY FOR 30 DAYS. 9/21/22 10/21/22  Cydney Young MD   sertraline (ZOLOFT) 100 MG tablet TAKE 1 TABLET BY MOUTH DAILY 9/21/22 10/21/22  Cydney Young MD   oxyCODONE-acetaminophen (PERCOCET) 5-325 MG per tablet Take 1 tablet by mouth every 4 hours as needed for Pain for up to 30 days. 9/13/22 10/13/22  Aziza Bhatia MD   FEROSUL 325 (65 Fe) MG tablet TAKE 1 TABLET BY MOUTH ONCE DAILY WITH BREAKFAST 9/9/22   Aziza Bhatia MD   levETIRAcetam (KEPPRA) 1000 MG tablet TAKE 1 TABLET (1,000 MG TOTAL) BY MOUTH IN THE MORNING AND 1 TABLET (1,000 MG TOTAL) BEFORE BEDTIME. 9/9/22   Aziza Bhatia MD   FEROSUL 325 (65 Fe) MG tablet TAKE 1 TABLET (325 MG TOTAL) BY MOUTH DAILY WITH BREAKFAST. 9/8/22   Shanda Jeremykobreanna, DO   pantoprazole (PROTONIX) 40 MG tablet Take 1 tablet by mouth daily 9/1/22   Aziza Bhatia MD   Lactobacillus (ACIDOPHILUS) CAPS capsule TAKE 1 TABLET BY MOUTH ONCE DAILY IN THE MORNING AND 1 TABLET BEFORE BEDTIME 8/31/22   Shanda Medhkour, DO   furosemide (LASIX) 20 MG tablet TAKE 1 TABLET (20 MG TOTAL) BY MOUTH DAILY.  8/29/22   Shanda Medhkobreanna, DO   dicyclomine (BENTYL) 20 MG tablet TAKE 1 TABLET (20 MG TOTAL) BY MOUTH IN THE MORNING AND 1 TABLET (20 MG TOTAL) BEFORE BEDTIME. 8/29/22   Shanda Medhkour, DO   potassium chloride (MICRO-K) 10 MEQ extended release capsule TAKE 1 CAPSULE (10 MEQ TOTAL) BY MOUTH IN THE MORNING. 8/29/22   Shanda Medhkour, DO   Handicap Placard MISC 5 years 7/19/22   Harshad Box Medevettekobreanna, DO   loperamide (IMODIUM) 2 MG capsule Take 2 mg by mouth 4 times daily as needed for Diarrhea    Historical Provider, MD   ondansetron (ZOFRAN-ODT) 4 MG disintegrating tablet Take 1 tablet by mouth every 8 hours as needed for Nausea or Vomiting 5/13/22   Cydney Young MD   senna (SENOKOT) 8.6 MG tablet Take 1 tablet by mouth 2 times daily 5/2/22 5/2/23  Radha Hernández MD   melatonin 5 MG TABS tablet Take 1 tablet by mouth daily 4/13/22   Marley Serrano DO benzonatate (TESSALON PERLES) 100 MG capsule Take 1 capsule by mouth 3 times daily as needed for Cough 4/13/22   Shanda Medhkour, DO   apixaban (ELIQUIS) 5 MG TABS tablet Take 1 tablet by mouth 2 times daily 4/8/22   Cydney Young MD   hydrocortisone (ANUSOL-HC) 2.5 % CREA rectal cream Apply on affected area twice daily 2/28/22   Azra Ortiz MD       CURRENT MEDICATIONS:  Scheduled Meds:   levETIRAcetam  1,000 mg Oral BID    famotidine  20 mg Oral BID    docusate  100 mg Oral Daily    apixaban  5 mg Oral BID    furosemide  20 mg Oral Daily    lamoTRIgine  125 mg Oral BID    sertraline  100 mg Oral Daily    melatonin  5 mg Oral Nightly    sodium chloride flush  5-40 mL IntraVENous 2 times per day    piperacillin-tazobactam  3,375 mg IntraVENous Q8H    vancomycin (VANCOCIN) intermittent dosing (placeholder)   Other RX Placeholder    vancomycin  750 mg IntraVENous Q8H     Continuous Infusions:   sodium chloride Stopped (10/04/22 0056)    sodium chloride 100 mL/hr at 10/05/22 0616     PRN Meds:oxyCODONE-acetaminophen, LORazepam, morphine, albuterol, midazolam, racepinephrine HCl, sodium chloride nebulizer, sodium chloride flush, sodium chloride, ondansetron **OR** ondansetron, polyethylene glycol, acetaminophen **OR** acetaminophen, potassium chloride **OR** potassium chloride    SOCIAL HISTORY:     Tobacco:   reports that she has been smoking cigarettes. She has a 20.00 pack-year smoking history. She has never used smokeless tobacco.  Alcohol:   reports that she does not currently use alcohol. Illicit drugs:  reports no history of drug use. FAMILY HISTORY:     Family History   Problem Relation Age of Onset    Alcohol Abuse Mother     Cirrhosis Mother        REVIEW OF SYSTEMS:    ROS was otherwise negative except as mentioned in the [address]      PHYSICAL EXAM:    BP (!) 145/88   Pulse 90   Temp 97.9 °F (36.6 °C) (Axillary)   Resp 21   Ht 5' 5\" (1.651 m)   Wt 153 lb 14.1 oz (69.8 kg)   SpO2 99%   BMI 25.61 kg/m²     GENERAL:   Well developed, Well nourished, No apparent distress  HEAD:   Normocephalic, Atraumatic  EENT:   EOMI, Sclera not icteric, Oropharynx moist   ABDOMEN:  Soft, mild diffuse tenderness but not distended BS WNL  EXT:   No clubbing. No cyanosis. No edema. SKIN:   No rashes. No jaundice. No stigmata of liver disease. LABS AND IMAGING:     CBC  Recent Labs     10/03/22  1339 10/04/22  0546 10/05/22  0500   WBC 10.2 6.9 8.4   HGB 14.2 11.5* 12.7   HCT 44.5 36.9 39.2   MCV 91.2 92.9 91.8   MCH 29.1 29.0 29.7   MCHC 31.9 31.2 32.4    174 154       IMMATURE PLTs  Lab Results   Component Value Date/Time    PLTFLUORE 1,172 06/23/2021 05:01 AM    PLTFLUORE 1,225 06/22/2021 04:14 AM    PLTFLUORE 1,172 06/21/2021 04:49 AM       BMP  Recent Labs     10/03/22  1341 10/04/22  0546 10/05/22  0500    134* 141   K 4.2 3.6* 2.6*    104 101   CO2 26 21 26   BUN 8 6 3*   CREATININE 0.42* 0.28* 0.35*   GLUCOSE 158* 123* 106*   CALCIUM 9.0 7.9* 8.3*       LFTS  Recent Labs     10/03/22  1341   ALKPHOS 98   BILITOT 0.3   AST 18   ALT 9   PROT 7.0   LABALBU 4.3       AMYLASE/LIPASE/AMMONIA  Recent Labs     10/03/22  1339   AMMONIA 36       PT/INR  No results for input(s): PROTIME, INR in the last 72 hours. ANEMIA STUDIES  No results for input(s): IRON, LABIRON, TIBC, UIBC, FERRITIN, [id number], FOLATE, OCCULTBLD in the last 72 hours.       LIVER WORK UP:    Acute Hepatitis Panel   No results found for: HEPBSAG, HEPCAB, HEPBIGM, HEPAIGM    HCV Genotype   No results found for: HEPATITISCGENOTYPE    HCV Quantitative   No results found for: HCVQNT    AFP  No results found for: AFP    Alpha 1 antitrypsin   No results found for: A1A    Anti - Liver/Kidney Ab  No results found for: LIVER-KIDNEYMICROSOMALAB    GAGE  No results found for: GAGE    AMA  No results found for: MITOAB    ASMA  No results found for: SMOOTHMUSCAB    Ceruloplasmin  No results found for: CERULOPLSM    Celiac panel  No results found for: TISSTRNTIIGG, TTGIGA, IGA    IgG  Lab Results   Component Value Date/Time     10/22/2020 04:05 PM       IgM  No results found for: IGM    GGT   No results found for: LABGGT    PT/INR  No results for input(s): PROTIME, INR in the last 72 hours. Cancer Markers:  CEA:  No results found for: CEA  Ca 125:    Lab Results   Component Value Date/Time     430 09/29/2022 11:37 AM     Ca 19-9:   No results found for:   AFP: No results found for: AFP    Lactic acid:No results for input(s): LACTACIDWB in the last 72 hours. IMAGING  CT HEAD WO CONTRAST    Result Date: 10/3/2022  EXAMINATION: CTA OF THE HEAD AND NECK WITH CONTRAST; CT OF THE HEAD WITHOUT CONTRAST 10/3/2022 3:10 pm; 10/3/2022 3:30 pm: TECHNIQUE: CTA of the head and neck was performed with the administration of intravenous contrast. Multiplanar reformatted images are provided for review. MIP images are provided for review. Stenosis of the internal carotid arteries measured using NASCET criteria. Automated exposure control, iterative reconstruction, and/or weight based adjustment of the mA/kV was utilized to reduce the radiation dose to as low as reasonably achievable.; CT of the head was performed without the administration of intravenous contrast. Automated exposure control, iterative reconstruction, and/or weight based adjustment of the mA/kV was utilized to reduce the radiation dose to as low as reasonably achievable. Noncontrast CT of the head with reconstructed 2-D images are also provided for review. COMPARISON: MRI brain performed 02/24/2022.  HISTORY: ORDERING SYSTEM PROVIDED HISTORY: AMS TECHNOLOGIST PROVIDED HISTORY: [facility] Decision Support Exception - unselect if not a suspected or confirmed emergency medical condition->Emergency Medical Condition (MA); ORDERING SYSTEM PROVIDED HISTORY: AMS TECHNOLOGIST PROVIDED HISTORY: AMS Decision Support Exception - unselect if not a suspected or confirmed emergency medical condition->Emergency Medical Condition (MA) FINDINGS: CT HEAD: BRAIN/VENTRICLES:  There is no acute intracranial hemorrhage, mass effect, or midline shift. There is satisfactory overall gray-white matter differentiation. The ventricular structures are symmetric and unremarkable. The infratentorial structures are unremarkable. ORBITS: The visualized portion of the orbits demonstrate no acute abnormality. SINUSES:  The visualized paranasal sinuses and mastoid air cells demonstrate no acute abnormality. SOFT TISSUES/SKULL: No acute abnormality of the visualized skull or soft tissues. CTA NECK: AORTIC ARCH/ARCH VESSELS: No dissection or arterial injury. No significant stenosis of the brachiocephalic or subclavian arteries. CAROTID ARTERIES: No dissection, arterial injury, or hemodynamically significant stenosis by NASCET criteria. VERTEBRAL ARTERIES: No dissection, arterial injury, or significant stenosis. SOFT TISSUES: There is consolidation in the visualized lungs bilaterally that is worse on the right compared to the left. No cervical or superior mediastinal lymphadenopathy. The larynx and pharynx are unremarkable. No acute abnormality of the salivary and thyroid glands. BONES: No acute osseous abnormality. CTA HEAD: ANTERIOR CIRCULATION: No significant stenosis of the intracranial internal carotid, anterior cerebral, or middle cerebral arteries. No aneurysm. POSTERIOR CIRCULATION: No significant stenosis of the vertebral, basilar, or posterior cerebral arteries. No aneurysm. OTHER: No dural venous sinus thrombosis on this non-dedicated study. 1. No acute intracranial abnormality. 2. No evidence for significant stenosis or occlusion. 3. Consolidation in the visualized lungs bilaterally that is worse on the right compared to the left. Correlate with report from dedicated CT chest, abdomen, and pelvis for further discussion.      XR CHEST PORTABLE    Result Date: 10/5/2022  EXAMINATION: ONE XRAY VIEW OF THE CHEST 10/5/2022 6:28 am COMPARISON: 10/03/2022, 06/03/2022 HISTORY: ORDERING SYSTEM PROVIDED HISTORY: Wheezing TECHNOLOGIST PROVIDED HISTORY: Wheezing FINDINGS: Endotracheal and enteric tubes have been removed. Right internal jugular port remains. Interstitial opacities appear more prominent in the interval. Patchy opacities in the lung bases again demonstrated. No focal area of consolidation, pneumothorax or effusion. Interval extubation. Interstitial opacities have increased in the interval, for which developing edema should be considered. XR CHEST PORTABLE    Result Date: 10/3/2022  EXAMINATION: ONE XRAY VIEW OF THE CHEST 10/3/2022 1:54 pm COMPARISON: Chest radiograph 06/03/2022. HISTORY: ORDERING SYSTEM PROVIDED HISTORY: AMS TECHNOLOGIST PROVIDED HISTORY: AMS FINDINGS: Single view provided. Right-sided subcutaneous medical infusion port with catheter tip in the distal superior vena cava. Endotracheal tube overlies tracheal air column with the tip 5 cm above the alejandro. Enteric tube follows the course of the esophagus crossing the GE junction and extending into the stomach with the tip at the level of the proximal to mid gastric body. Stable mediastinal and cardiac silhouettes with aortic atherosclerosis. Stable right hemidiaphragm elevation. Progressive right lung base curvilinear opacity. Stable bilateral perihilar interstitial reticulonodular densities. No lobar consolidation. No effusion or pneumothorax. No free subdiaphragmatic air. Stable embolization coils of the left upper quadrant. Redemonstration of cholelithiasis. 1. Endotracheal tube tip lies 5 cm above the alejandro. 2. Enteric tube in the stomach with the tip at the level of the proximal to mid gastric body. 3. Stable right mid I a frame elevation resulting in right hemithorax volume loss. 4. There is progressive curvilinear right lung base opacity which may represent progressive atelectasis.  5. Stable perihilar interstitial reticulonodular densities. No acute consolidation or pulmonary edema. CTA HEAD NECK W CONTRAST    Result Date: 10/3/2022  EXAMINATION: CTA OF THE HEAD AND NECK WITH CONTRAST; CT OF THE HEAD WITHOUT CONTRAST 10/3/2022 3:10 pm; 10/3/2022 3:30 pm: TECHNIQUE: CTA of the head and neck was performed with the administration of intravenous contrast. Multiplanar reformatted images are provided for review. MIP images are provided for review. Stenosis of the internal carotid arteries measured using NASCET criteria. Automated exposure control, iterative reconstruction, and/or weight based adjustment of the mA/kV was utilized to reduce the radiation dose to as low as reasonably achievable.; CT of the head was performed without the administration of intravenous contrast. Automated exposure control, iterative reconstruction, and/or weight based adjustment of the mA/kV was utilized to reduce the radiation dose to as low as reasonably achievable. Noncontrast CT of the head with reconstructed 2-D images are also provided for review. COMPARISON: MRI brain performed 02/24/2022. HISTORY: ORDERING SYSTEM PROVIDED HISTORY: AMS TECHNOLOGIST PROVIDED HISTORY: AMS Decision Support Exception - unselect if not a suspected or confirmed emergency medical condition->Emergency Medical Condition (MA); ORDERING SYSTEM PROVIDED HISTORY: AMS TECHNOLOGIST PROVIDED HISTORY: AMS Decision Support Exception - unselect if not a suspected or confirmed emergency medical condition->Emergency Medical Condition (MA) FINDINGS: CT HEAD: BRAIN/VENTRICLES:  There is no acute intracranial hemorrhage, mass effect, or midline shift. There is satisfactory overall gray-white matter differentiation. The ventricular structures are symmetric and unremarkable. The infratentorial structures are unremarkable. ORBITS: The visualized portion of the orbits demonstrate no acute abnormality.  SINUSES:  The visualized paranasal sinuses and mastoid air cells demonstrate no acute abnormality. SOFT TISSUES/SKULL: No acute abnormality of the visualized skull or soft tissues. CTA NECK: AORTIC ARCH/ARCH VESSELS: No dissection or arterial injury. No significant stenosis of the brachiocephalic or subclavian arteries. CAROTID ARTERIES: No dissection, arterial injury, or hemodynamically significant stenosis by NASCET criteria. VERTEBRAL ARTERIES: No dissection, arterial injury, or significant stenosis. SOFT TISSUES: There is consolidation in the visualized lungs bilaterally that is worse on the right compared to the left. No cervical or superior mediastinal lymphadenopathy. The larynx and pharynx are unremarkable. No acute abnormality of the salivary and thyroid glands. BONES: No acute osseous abnormality. CTA HEAD: ANTERIOR CIRCULATION: No significant stenosis of the intracranial internal carotid, anterior cerebral, or middle cerebral arteries. No aneurysm. POSTERIOR CIRCULATION: No significant stenosis of the vertebral, basilar, or posterior cerebral arteries. No aneurysm. OTHER: No dural venous sinus thrombosis on this non-dedicated study. 1. No acute intracranial abnormality. 2. No evidence for significant stenosis or occlusion. 3. Consolidation in the visualized lungs bilaterally that is worse on the right compared to the left. Correlate with report from dedicated CT chest, abdomen, and pelvis for further discussion. CT CHEST ABDOMEN PELVIS W CONTRAST    Result Date: 10/3/2022  EXAMINATION: CT OF THE CHEST, ABDOMEN, AND PELVIS WITH CONTRAST 10/3/2022 3:10 pm TECHNIQUE: CT of the chest, abdomen and pelvis was performed with the administration of intravenous contrast. Multiplanar reformatted images are provided for review. Automated exposure control, iterative reconstruction, and/or weight based adjustment of the mA/kV was utilized to reduce the radiation dose to as low as reasonably achievable. COMPARISON: Chest radiograph 10/03/2022. CT abdomen/pelvis 06/03/2022.  CT PE study 02/20/2022. HISTORY: ORDERING SYSTEM PROVIDED HISTORY: concerns for mets or bleed TECHNOLOGIST PROVIDED HISTORY: concerns for mets or bleed History of ovarian cancer. FINDINGS: Mediastinum: Normal heart size. There is a mild dependent pericardial effusion which measures 1.2 cm in thickness and is improved compared to the prior exam.  Right-sided subcutaneous medical infusion port with catheter tip in the right atrium. The pulmonary arteries are normal in size. Adequate contrast opacification to the segmental level with no acute intraluminal filling defect. Mild coronary artery calcifications. Mild thoracic aortic atherosclerotic calcifications with no aneurysmal dilatation or evidence of dissection. The esophagus is decompressed. There is an enteric tube within the esophagus which passes into the stomach. Multifocal mediastinal lymphadenopathy with a right paratracheal lymph node measuring 1.0 x 1.5 cm on axial image 39, this is stable compared to the prior exam.  Stable multiple enlarged lateral aortic recess lymph nodes. Precarinal lymph node on axial image 53 measuring 1.3 x 1.8 cm, stable compared to the prior exam. Stable bilateral symmetric mild enlarged hilar lymph nodes consistent with lymphadenopathy. Lungs/pleura: The endotracheal tube is in the trachea with the tip approximately 3 cm above the alejandro. The trachea is patent. The endobronchial tree is patent. No pneumothorax or effusion. Innumerable bilateral 0.2-0.4 cm solid pulmonary nodules, there is 1 in the lateral left upper lobe which demonstrates central lucency consistent with necrosis. Stable left lower lobe axial interstitial soft tissue thickening with a oval 2.0 x 2.6 cm mass on axial image 78 which is stable compared to the prior exam.  Progressive right lower lobe volume loss and peribronchial ground-glass opacity and consolidation. Progressive right lung base volume loss and peribronchial consolidation. No lobar consolidation. Abdomen: Streak artifact related to splenic vascular embolization coils result in significant beam streak artifact and limit evaluation of the left hemiabdomen. No intrahepatic mass or biliary dilatation. Redemonstration of cholelithiasis with no wall thickening or adjacent inflammation. The common bile duct and pancreas demonstrate no acute abnormality. Splenic hilar and inferior pole focal atrophy and coarse calcifications which appear stable compared to the prior exam and may represent treated metastatic lesions. There is stable adjacent pleural, chest, and abdominal wall thickening and perisplenic fat induration. The adrenals and kidneys demonstrate no acute abnormality. Abdominal aortic atherosclerosis with no aneurysmal dilatation. Stomach is decompressed with mild mucosal fold thickening. The enteric tube is in the stomach with the tip extending to the mid gastric body. Small bowel and appendix are unremarkable. Left hemiabdomen subcutaneous medical infusion port with catheter entering the peritoneal cavity in the left paramedian anterior pelvis with a catheter extending to the posterosuperior pelvis. No ascites or free intraperitoneal air. The ascending colon demonstrates stable mild wall thickening without distention. The transverse and descending colon demonstrate moderate distention with liquid air stool levels and diffuse mild wall thickening which appears slightly improved compared to the prior exam.  No evidence of pneumatosis. There is abrupt transition with decompressed distal sigmoid colon to the level of the rectum. This appears similar to the prior exam. Left periaortic enlarged lymph nodes with associated coarse calcifications on axial image 91 a lymph node demonstrates interval increase in size measuring 2.2 x 2.3 cm compared to 1.6 x 1.8 cm. There is new adjacent fat induration. There are numerous other retroperitoneal left periaortic lymph nodes which appear stable in size. Pelvis: A Mercado catheter is within the bladder with mild contrast excreted into the bladder and no focal abnormality. The rectum is normal.  Within the pelvis there is a irregular solid and cystic peritoneal mass identified on axial image 154 measuring approximately 2.8 x 4.7 cm stable compared to the prior exam with associated coarse calcifications. There are multiple colonic tethers. Superior to this there is a presacral solid and cystic mass with calcifications that measures 2.1 x 2.9 cm on axial image 148 which has increased in size previously measuring 1.4 x 3.0 cm. Multifocal peritoneal retroperitoneal reticulonodular solid nodules with associated coarse calcifications. Stable densely calcified and enlarged right external iliac chain lymph node. Musculoskeletal & Body Wall:  Enlarged right axillary lymph node measuring 1.1 x 1.5 cm on axial image 29 with associated coarse calcifications which has mildly increased in size. No significant left axillary lymphadenopathy. Interval improvement of body wall edema compared to the prior exam.  There is mild body wall edema. Left inguinal lymphadenopathy which appears stable compared to the prior exam. Multifocal lower thoracic and lumbar intramedullary sclerotic blastic lesions. Multifocal pelvic intramedullary sclerotic lesions. Sclerotic lesions appear stable compared to the prior exam.  No acute fracture. 1. Progressive bibasilar and left lower lobe consolidative change which may represent atelectasis versus superimposed pneumonia. 2. Redemonstration of thoracic and abdominal metastatic disease with mixed change. Most lesions appear stable with a few interval progression (right axillary metastatic lymphadenopathy. Retroperitoneal left periaortic metastatic lymphadenopathy, pelvic presacral cystic mass). 3. Redemonstration of peritoneal carcinomatosis. No significant ascites. 4. Stable blastic skeletal metastasis.  5. Moderate transverse and descending colonic distention with liquid stool. Abrupt transition in the mid sigmoid colon which may relate to malignant partial obstruction or stricture. Mild interval improvement of colonic edema. No evidence of colonic perforation or pneumatosis. MRI BRAIN WO CONTRAST    Result Date: 10/4/2022  EXAMINATION: MRI OF THE BRAIN WITHOUT CONTRAST  10/4/2022 3:55 pm TECHNIQUE: Multiplanar multisequence MRI of the brain was performed without the administration of intravenous contrast. COMPARISON: CT head 1 day prior. MRI brain 02/24/2022 HISTORY: ORDERING SYSTEM PROVIDED HISTORY: encephalopathy, hx of CA, hx of prior PRES with subsequent seizure disorder TECHNOLOGIST PROVIDED HISTORY: encephalopathy, hx of CA, hx of prior PRES with subsequent seizure disorder Reason for Exam: Altered Mental Status - encephalopathy, hx of CA, hx of prior PRES with subsequent seizure disorder FINDINGS: Motion limited evaluation. INTRACRANIAL STRUCTURES/VENTRICLES: No diffusion restriction to suggest acute infarct. Subtle small focus of gas that subtle new small focus of cortical and subcortical T2/FLAIR hyperintensity involving the lateral left frontal lobe best seen on FLAIR image 21. Stable small foci of right occipital encephalomalacia with mild surrounding gliosis. No mass effect or midline shift. No evidence of an acute intracranial hemorrhage. The ventricles and sulci are normal in size and configuration. The sellar/suprasellar regions appear unremarkable. The normal signal voids within the major intracranial vessels appear maintained. ORBITS: The visualized portion of the orbits demonstrate no acute abnormality. SINUSES: The visualized paranasal sinuses and mastoid air cells demonstrate no acute abnormality. BONES/SOFT TISSUES: The bone marrow signal intensity appears normal. The soft tissues demonstrate no acute abnormality.      1. New subtle focus of T2/FLAIR hyperintensity in the lateral left frontal cortex and subcortical white matter is nonspecific and may reflect sequela of prior ischemia or mild posterior reversal encephalopathy syndrome. Short interval follow-up is recommended. 2. No evidence of acute infarct or intracranial hemorrhage. 3. Stable small foci of right occipital encephalomalacia in keeping with sequela of prior insult. IMPRESSION:     Sigmoid stricture likely from peritoneal carcinomatosis however the patient is having multiple liquidy bowel movements  Questionable seizures for which she is getting an EEG. Ovarian cancer with diffuse peritoneal carcinomatosis as well as lung metastasis    Old records, labs and imaging reviewed. PLAN   1. Patient has sigmoid stricture likely from peritoneal carcinomatosis. Patient is having multiple bowel movements. Would continue with the bowel regimen. Colonic dilatations does not last long and would not be recommended. If she has a complete large bowel obstruction then colonic stent can be considered. However clinically patient is having bowel movements and abdomen is not distended, at this time colonic stent would not be indicated. We will sign off. Please call with any questions. Neo Rodas MD      Please note that this note was generated using a voice recognition dictation software. Although every effort was made to ensure the accuracy of this automated transcription, some errors in transcription may have occurred.

## 2022-10-05 NOTE — PROGRESS NOTES
Critical Care Team - Daily Progress Note      Date and time: 10/5/2022 8:11 AM  Patient's [facility]:  [facility]  Medical Record Number: [id number]  Patient's account/billing number: [de-identified]  Patient's YOB: 1965  Age: 59 y.o. Date of Admission: 10/3/2022  1:07 PM  Length of stay during current admission: 2      Primary Care Physician: Bolivar Castillo DO  ICU Attending Physician: Dr. Rosalina Denise Status: Full Code    Reason for ICU admission:   Chief Complaint   Patient presents with    Altered Mental Status         SUBJECTIVE:   HPI:  Patient is a 56-year-old female with past medical history of metastatic ovarian cancer with multiple recurrences presented to the emergency department with altered mental status. Was found by daughter very drowsy on 10/2, daughters that she was tired and let her sleep. On return patient was increasingly lethargic and altered, daughter called 911. Patient came to the emergency department with EMS with a GCS of 8. She was found to be febrile and tachycardic, she was intubated for airway protection sepsis work-up was initiated in the emergency department. Patient was given 2 L fluid bolus in the emergency department, was hypertensive and tachycardic, agitated with the endotracheal tube. CT head was negative for any acute processes including cranial metastases. Initial VBG with pH of 7.47, PCO2 of 34.1, bicarb of 25.2 and a lactic acid of 1.56. Patient was started on broad-spectrum antibiotics and see department. Patient did have history of intra-abdominal bleeding secondary to splenic mass with GI infiltration, status postembolization of the spleen. Interval history:    - During bedside evaluation, patient is alert and under no acute distress. The patient was breathing on 3L NC O2 and was comfortable (was extubated on 10/4).    - She was following commands.   - Patient was hemodynamically stable, on no pressors.   -Findings evaluation, the patient's EEG showed 4 electroclinical seizures with left hand Pain and left hand deviation. These were similar to yesterday seizures. Patient was loaded with 2 g Keppra and 1 g Ativan and the maintenance dose of Keppra was changed from 1 g at bedtime to 1 g twice daily.  - Neurology following - restarted home dose of Keppra 1000mg HS.  - General surgery following - recommended GI consult for possible dilation of sigmoid colon and no surgical intervention. OVERNIGHT EVENTS:       Versed Overnight for anxiety every 2 hr; No other acute complaints.     AWAKE & FOLLOWING COMMANDS:  [] No   [x] Yes    CURRENT VENTILATION STATUS:     [] Ventilator  [] BIPAP  [x] Nasal Cannula [] Room Air      IF INTUBATED, ET TUBE MARKING AT LOWER LIP:       cms    SECRETIONS Amount:  [] Small [] Moderate  [] Large  [x] None  Color:     [] White [] Colored  [] Bloody    SEDATION:    [] Propofol gtt  [] Versed gtt  [] Ativan gtt   [] No Sedation    PARALYZED:  [x] No    [] Yes    DIARRHEA:                [x] No                [] Yes  (C. Difficile status: [] positive                                                                                                                       [] negative                                                                                                                     [] pending)    VASOPRESSORS:  [x] No    [] Yes    If yes -   [] Levophed       [] Dopamine     [] Vasopressin       [] Dobutamine  [] Phenylephrine         [] Epinephrine    CENTRAL LINES:     [x] No   [] Yes   (Date of Insertion:   )           If yes -     [] Right IJ     [] Left IJ [] Right Femoral [] Left Femoral                   [] Right Subclavian [] Left Subclavian       BROWNLEE'S CATHETER:   [] No   [x] Yes  (Date of Insertion: 10/3/2022)     URINE OUTPUT:            [x] Good   [] Low              [] Anuric      OBJECTIVE:     VITAL SIGNS:  BP (!) 174/93   Pulse 92   Temp 98 °F (36.7 °C) (Oral)   Resp 29   Ht 5' 5\" (1.651 m)   Wt 153 lb 14.1 oz (69.8 kg)   SpO2 98%   BMI 25.61 kg/m²   Tmax over 24 hours:  Temp (24hrs), Av.3 °F (36.8 °C), Min:98 °F (36.7 °C), Max:98.5 °F (36.9 °C)      Patient Vitals for the past 8 hrs:   BP Temp Temp src Pulse Resp SpO2   10/05/22 0500 (!) 174/93 -- -- 92 29 98 %   10/05/22 0400 (!) 161/78 98 °F (36.7 °C) Oral 93 22 98 %   10/05/22 0300 (!) 165/86 -- -- 100 30 99 %   10/05/22 0228 -- -- -- -- (!) 31 --   10/05/22 0200 (!) 151/86 98.5 °F (36.9 °C) -- (!) 108 29 96 %   10/05/22 0100 (!) 155/79 -- -- 90 20 99 %   10/05/22 0015 (!) 156/81 -- -- 98 23 --         Intake/Output Summary (Last 24 hours) at 10/5/2022 0811  Last data filed at 10/5/2022 0537  Gross per 24 hour   Intake 3242.24 ml   Output 4988 ml   Net -1745.76 ml     Date 10/05/22 0000 - 10/05/22 2359   Shift 0656-2307 9508-7620 2279-4421 24 Hour Total   INTAKE   I.V.(mL/kg) 1100(15.8)   1100(15.8)   IV Piggyback(mL/kg) 302. 5(4.3)   302. 5(4.3)   Shift Total(mL/kg) 1402. 5(20.1)   1402. 5(20.1)   OUTPUT   Urine(mL/kg/hr) 1300(2.3)   1300   Shift Total(mL/kg) 1300(18.6)   1300(18.6)   Weight (kg) 69.8 69.8 69.8 69.8     Wt Readings from Last 3 Encounters:   10/04/22 153 lb 14.1 oz (69.8 kg)   10/04/22 153 lb (69.4 kg)   22 147 lb (66.7 kg)     Body mass index is 25.61 kg/m². PHYSICAL EXAM:  Constitutional: Appears well, no distress  EENT: PERRLA, EOMI, sclera clear, anicteric, oropharynx clear, no lesions, neck supple with midline trachea. Neck: Supple, symmetrical, trachea midline. Respiratory: Wheezing, coarse on left side during auscultation. Right-side was normal.  Cardiovascular: regular rate and rhythm, normal S1, S2, no murmur noted and 2+ pulses throughout  Abdomen: Patient reports pain in abdominal.  Abdominal examination reveals tender abdomen. NEUROLOGIC: Not answering questions clearly.    Extremities:  peripheral pulses normal, no pedal edema, no clubbing or cyanosis  SKIN: normal coloration and turgor        MEDICATIONS:  Scheduled Meds:   docusate  100 mg Oral Daily    apixaban  5 mg Oral BID    furosemide  20 mg Oral Daily    lamoTRIgine  125 mg Oral BID    levETIRAcetam  1,000 mg Oral QPM    sertraline  100 mg Oral Daily    melatonin  5 mg Oral Nightly    sodium chloride flush  5-40 mL IntraVENous 2 times per day    famotidine (PEPCID) injection  20 mg IntraVENous BID    piperacillin-tazobactam  3,375 mg IntraVENous Q8H    vancomycin (VANCOCIN) intermittent dosing (placeholder)   Other RX Placeholder    vancomycin  750 mg IntraVENous Q8H     Continuous Infusions:   sodium chloride Stopped (10/04/22 0056)    sodium chloride 100 mL/hr at 10/05/22 0616     PRN Meds:   oxyCODONE-acetaminophen, 1 tablet, Q4H PRN  LORazepam, 0.5 mg, Once PRN  morphine, 2 mg, Q4H PRN  albuterol, 2.5 mg, Q6H PRN  midazolam, 1 mg, Q2H PRN  racepinephrine HCl, 11.25 mg, Q3H PRN  sodium chloride nebulizer, 3 mL, Q4H PRN  sodium chloride flush, 5-40 mL, PRN  sodium chloride, , PRN  ondansetron, 4 mg, Q8H PRN   Or  ondansetron, 4 mg, Q6H PRN  polyethylene glycol, 17 g, Daily PRN  acetaminophen, 650 mg, Q6H PRN   Or  acetaminophen, 650 mg, Q6H PRN  potassium chloride, 20 mEq, PRN   Or  potassium chloride, 10 mEq, PRN        SUPPORT DEVICES: [] Ventilator [] BIPAP  [x] Nasal Cannula [] Room Air    Additional Respiratory Assessments  Heart Rate: 92  Resp: 29  SpO2: 98 %  End Tidal CO2: 36 (%)  Position: Semi-Jolley's  Humidification Source: [facility]    ABGs on 10/4/2022:    Arterial Blood Gas result:  pH 7.40; pCO2 40.9  ; pO2 104.0  Lab Results   Component Value Date/Time    FRG7FAI NOT REPORTED 06/19/2021 03:25 AM    FIO2 40.0 10/04/2022 04:16 AM     Lactic Acid:   Lab Results   Component Value Date/Time    LACTA NOT REPORTED 06/20/2021 09:39 AM    LACTA NOT REPORTED 06/20/2021 03:53 AM    LACTA NOT REPORTED 06/19/2021 11:52 PM         DATA:  Complete Blood Count:   Recent Labs     10/03/22  1339 10/04/22  0546 10/05/22  0500   WBC 10.2 6.9 8.4   HGB 14.2 11.5* 12.7   MCV 91.2 92.9 91.8    174 154   RBC 4.88 3.97 4.27   HCT 44.5 36.9 39.2   MCH 29.1 29.0 29.7   MCHC 31.9 31.2 32.4   RDW 17.1* 17.2* 16.4*   MPV 9.4 9.9 9.7        PT/INR:    Lab Results   Component Value Date/Time    PROTIME 12.2 05/03/2022 05:28 PM    INR 1.2 05/03/2022 05:28 PM     PTT:    Lab Results   Component Value Date/Time    APTT 19.5 05/03/2022 05:28 PM       Basal Metabolic Profile:   Recent Labs     10/03/22  1341 10/04/22  0546 10/05/22  0500    134* 141   K 4.2 3.6* 2.6*   BUN 8 6 3*   CREATININE 0.42* 0.28* 0.35*    104 101   CO2 26 21 26      Magnesium:   Lab Results   Component Value Date/Time    MG 1.7 10/05/2022 05:00 AM    MG 1.5 10/03/2022 01:41 PM    MG 1.6 05/03/2022 05:50 PM     Phosphorus:   Lab Results   Component Value Date/Time    PHOS 3.6 06/22/2021 04:14 AM    PHOS 2.7 06/21/2021 04:49 AM    PHOS 2.8 06/20/2021 03:53 AM     S. Calcium:  Recent Labs     10/05/22  0500   CALCIUM 8.3*     S. Ionized Calcium:No results for input(s): IONCA in the last 72 hours. Urinalysis:   Lab Results   Component Value Date/Time    NITRU NEGATIVE 10/03/2022 02:38 PM    COLORU Yellow 10/03/2022 02:38 PM    PHUR 6.5 10/03/2022 02:38 PM    WBCUA None 10/03/2022 02:38 PM    RBCUA 2 TO 5 10/03/2022 02:38 PM    MUCUS NOT REPORTED 02/20/2022 10:40 AM    TRICHOMONAS NOT REPORTED 02/20/2022 10:40 AM    YEAST NOT REPORTED 02/20/2022 10:40 AM    BACTERIA NOT REPORTED 02/20/2022 10:40 AM    SPECGRAV 1.018 10/03/2022 02:38 PM    LEUKOCYTESUR NEGATIVE 10/03/2022 02:38 PM    UROBILINOGEN Normal 10/03/2022 02:38 PM    BILIRUBINUR NEGATIVE 10/03/2022 02:38 PM    GLUCOSEU 1+ 10/03/2022 02:38 PM    KETUA SMALL 10/03/2022 02:38 PM    AMORPHOUS NOT REPORTED 02/20/2022 10:40 AM       CARDIAC ENZYMES: No results for input(s): CKMB, CKMBINDEX, TROPONINI in the last 72 hours.     Invalid input(s): CKTOTAL;3  BNP: No results for input(s): BNP in the last 72 hours.    LFTS  Recent Labs     10/03/22  1341   ALKPHOS 98   ALT 9   AST 18   BILITOT 0.3   LABALBU 4.3       AMYLASE/LIPASE/AMMONIA  Recent Labs     10/03/22  1339   AMMONIA 36       Last 3 Blood Glucose:   Recent Labs     10/03/22  1341 10/04/22  0546 10/05/22  0500   GLUCOSE 158* 123* 106*      HgBA1c:    Lab Results   Component Value Date/Time    LABA1C 5.2 04/13/2022 01:10 PM         TSH:    Lab Results   Component Value Date/Time    TSH 2.93 03/09/2021 05:43 PM     ANEMIA STUDIES  No results for input(s): LABIRON, TIBC, FERRITIN, MPOOMGRA01, FOLATE, OCCULTBLD in the last 72 hours. Cultures during this admission:     Blood cultures:                 [] None drawn      [x] Negative             []  Positive (Details:  )  Urine Culture:                   [] None drawn      [x] Negative             []  Positive (Details:  )  Sputum Culture:               [x] None drawn       [] Negative             []  Positive (Details:  )   Endotracheal aspirate:     [x] None drawn       [] Negative             []  Positive (Details:  )             Chest Xray (10/5/2022): Impression   Interval extubation. Interstitial opacities have increased in the interval,   for which developing edema should be considered. CT chest abdomen pelvis with contrast:  Impression   1. Progressive bibasilar and left lower lobe consolidative change which may   represent atelectasis versus superimposed pneumonia. 2. Redemonstration of thoracic and abdominal metastatic disease with mixed   change. Most lesions appear stable with a few interval progression (right   axillary metastatic lymphadenopathy. Retroperitoneal left periaortic   metastatic lymphadenopathy, pelvic presacral cystic mass). 3. Redemonstration of peritoneal carcinomatosis. No significant ascites. 4. Stable blastic skeletal metastasis. 5. Moderate transverse and descending colonic distention with liquid stool.    Abrupt transition in the mid sigmoid colon which may relate to malignant   partial obstruction or stricture. Mild interval improvement of colonic   edema. No evidence of colonic perforation or pneumatosis     ASSESSMENT:     Principal Problem:    Septicemia ([address]  Active Problems:    AMS (altered mental status)  Resolved Problems:    * No resolved hospital problems. *   FLUIDS:0.9% NaCl 100 ml/hr  FEEDING:P.O. Adult Regular  ANALGESICS:Percocet 1 tablet PRN; Morphine 2mg injection PRN  SEDATION: No daily sedation/Analgesia. Was given Ativan in the morning due to seizure activity. THROMBO- PROPHYLAXIS: Eliquis  MOBILIZATION: Pt/OT not following  HEADS UP:30  HEMODYNAMICS: 147/76 (95)  ULCER PROPHYLAXIS: Pepcid   GLYCEMIC CONTROL:None  SPONTANEOUS BREATHING TRIAL: Breathing on 3L NC oxygen  BOWEL MANAGEMENT:Colace 100 mg oral daily  INDWELLING CATHETER:Mercado 16 fr  DRUG DE-ESCALATION: None      PLAN:     PLAN/MEDICAL DECISION MAKING:  Neurologic:   LTM is showing patient having multiple seizures since last night as per neurology note. Neuro checks per protocol  no sedation, Ativan in the morning due to seizure activity. Patient loaded with 2 g Keppra 1 mg Ativan in the morning. Keppra maintenance dose changed from 1 g at bedtime to 1 g twice daily. Precedex stopped on 10/4/2022. Cardiovascular:  Hemodynamically stable  MAP goal >65  Patient on no pressors. Pulmonary:  Maintain oxygen sats >92%  Pulmonary toilet  Patient extubated on 10/4/2022. Currently on 2 L nasal cannula oxygen. Breathing comfortably. GI/Nutrition  Colace 100 mg oral daily  Ulcer Prophylaxis: H2 blockers Pepcid 20 mg oral 2 times daily  Diet:ADULT DIET; Regular  CT scan abdomen shows dilated colon with stricturing sigmoid unchanged from June 2022. General surgery following, recommended serial abdominal examination. General surgery recommended GI for recommendations and possible dilatation. No surgical intervention planned.   According to GI, sigmoid stricture likely from peritoneal carcinomatosis. Patient having multiple liquidy bowel movements. GI plan to continue with current bowel regimen. According to GI, colonic dilatation does not last long and would not be recommended. Colonic stent only went complete bowel obstruction. Renal/Fluid/Electrolyte  IV Fluids: 0.9NS @ 100 mL/Hr   I/O: In: 3336.3 [I.V.:2299.8; NG/GT:40]  Out: 4988 [Urine:4988]  UOP: 3 cc/kg/hr  Monitor electrolytes, replace PRN   ID  WBC:   Lab Results   Component Value Date    WBC 8.4 10/05/2022     Tmax: Temp (24hrs), Av.3 °F (36.8 °C), Min:98 °F (36.7 °C), Max:98.5 °F (36.9 °C)    Antimicrobials: vancomycin and Zosyn continued. Hematology:  Recent Labs     10/03/22  1339 10/04/22  0546 10/05/22  0500   HGB 14.2 11.5* 12.7    stable  Endocrine:   glucose controlled - most recent BGL is   Recent Labs     10/03/22  1341 10/04/22  0546 10/05/22  0500   GLUCOSE 158* 123* 106*     DVT Prophylaxis  Eliquis    -PT OT following. Discharge Needs:  PT, OT, ST, SW, and Case Management      CODE STATUS: Full Code             Amanda De La Cruz MD, M.D.              Department of Internal Medicine/ Critical care  [facility], [facility] ([place]             10/5/2022, 8:11 AM

## 2022-10-05 NOTE — PROGRESS NOTES
[address] [facility] Pharmacokinetic Monitoring Service - Vancomycin    Consulting Provider: Eve Diggs   Indication: sepsis  Target Concentration: Goal AUC/TAQUERIA 400-600 mg*hr/L  Day of Therapy: 3  Additional Antimicrobials: Zosyn    Pertinent Laboratory Values: Wt Readings from Last 1 Encounters:   10/04/22 153 lb 14.1 oz (69.8 kg)     Temp Readings from Last 1 Encounters:   10/05/22 97.9 °F (36.6 °C) (Oral)     Estimated Creatinine Clearance: 170 mL/min (A) (based on SCr of 0.35 mg/dL (L)). Recent Labs     10/04/22  0546 10/05/22  0500   CREATININE 0.28* 0.35*   WBC 6.9 8.4     Procalcitonin: 0.25 ng/mL    Pertinent Cultures:  Culture Date Source Results   10.03 Blood x2 Pending   MRSA Nasal Swab: N/A. Non-respiratory infection.     Recent vancomycin administrations                     vancomycin (VANCOCIN) 750 mg in sodium chloride 0.9 % 250 mL IVPB (mg) 750 mg New Bag 10/05/22 0815     750 mg New Bag 10/04/22 2351     750 mg New Bag  1658     750 mg New Bag  0743     750 mg New Bag  0058    vancomycin (VANCOCIN) intermittent dosing (placeholder) ()  Given 10/03/22 1900    vancomycin (VANCOCIN) 1000 mg in dextrose 5% 200 mL IVPB (mg) 1,000 mg New Bag 10/03/22 1618        Assessment:  Date/Time Current Dose Concentration Timing of Concentration (h) AUC   10.05 750mg IV Q8H  12.2 mcg/mL ~5H post dose 370   Note: Serum concentrations collected for AUC dosing may appear elevated if collected in close proximity to the dose administered, this is not necessarily an indication of toxicity    Plan:  Current dosing regimen is sub-therapeutic  Increase dose to 1000mg IV q8H  Pharmacy will continue to monitor patient and adjust therapy as indicated    Thank you for the consult,  Gabe Cohen, PharmD BCPS [facility]  10/5/2022 2:44 PM

## 2022-10-05 NOTE — PROGRESS NOTES
PROGRESS NOTE          PATIENT NAME: Lu Parsons [facility]  MEDICAL RECORD NO. [id number]  DATE: 10/5/2022  SURGEON: Dr. 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)    Acute encephalopathy    PRES (posterior reversible encephalopathy syndrome)    Hemianopia, bitemporal    Encephalopathy acute    Status epilepticus ([address]    History of ovarian cancer    Pleural effusion    Bandemia    Cancer, metastatic to bone (HCC)    Intractable low back pain    Fatigue    Chronic deep vein thrombosis (DVT) of femoral vein of left lower extremity (HCC)    Iron deficiency anemia secondary to inadequate dietary iron intake    Iron malabsorption    Gynecologic cancer (HCC)    Thrombocytosis    History of deep venous thrombosis (DVT) of distal vein of left lower extremity: On Eliquis    Pelvic mass    Acute on chronic anemia    AMS (altered mental status)    Lactic acidosis    Anxiety    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)       MEDICAL DECISION MAKING AND PLAN    Dilated colon with stricture in sigmoid   Unchanged from June 2022   Serial abdominal exams   Follow-up GI recommendations   No surgical intervention at this time.     Seizures   EEG   Neurology following    Hypokalemia   Replacement per primary        SUBJECTIVE    [facility] was seen at bedside. Currently on LTME. Sitter at bedside. Per reports, patient had multiple seizures last night. OBJECTIVE  VITALS: Temp: Temp: 97.9 °F (36.6 °C)Temp  Av.2 °F (36.8 °C)  Min: 97.9 °F (36.6 °C)  Max: 98.5 °F (74.5 °C) BP Systolic (93RBU), IEB:707 , Min:96 , GJO:494   Diastolic (45JVJ), LYK:27, Min:65, Max:106   Pulse Pulse  Av.7  Min: 59  Max: 121 Resp Resp  Av.9  Min: 18  Max: 36 Pulse ox SpO2  Av.4 %  Min: 94 %  Max: 100 %  GENERAL: opens eyes to voice  NEURO: not answering questions. OPens eyes to voice. LTME on.  REsp: Expiratory wheezes throughout  Cardio: Regular rate and rhythm. ABDOMEN: soft, non-tender, non-distended, and no guarding or peritoneal signs present  EXTREMITY: no cyanosis, clubbing or edema    I/O last 3 completed shifts: In: 4348.1 [I.V.:4770.7; Other:100; NG/GT:100; IV Piggyback:1525]  Out: 3606 [Urine:5362; Emesis/NG output:300]    Drain/tube output:   In: 4116.1 [I.V.:2299.8; NG/GT:40]  Out: 4988 [Urine:4988]    LAB:  CBC:   Recent Labs     10/03/22  1339 10/04/22  0546 10/05/22  0500   WBC 10.2 6.9 8.4   HGB 14.2 11.5* 12.7   HCT 44.5 36.9 39.2   MCV 91.2 92.9 91.8    174 154       BMP:   Recent Labs     10/03/22  1341 10/04/22  0546 10/05/22  0500    134* 141   K 4.2 3.6* 2.6*    104 101   CO2 26 21 26   BUN 8 6 3*   CREATININE 0.42* 0.28* 0.35*   GLUCOSE 158* 123* 106*       COAGS:   Recent Labs     10/03/22  1341   PROT 7.0

## 2022-10-05 NOTE — PLAN OF CARE
Problem: Respiratory - Adult  Goal: Achieves optimal ventilation and oxygenation  10/5/2022 1824 by Roseann Magana RN  Outcome: Progressing  10/5/2022 1822 by Roseann Magnaa RN  Outcome: Progressing     Problem: Discharge Planning  Goal: Discharge to home or other facility with appropriate resources  10/5/2022 1824 by Roseann Magana RN  Outcome: Progressing  10/5/2022 1822 by Roseann Magana RN  Outcome: Progressing     Problem: Safety - Adult  Goal: Free from fall injury  10/5/2022 1824 by Roseann Magana RN  Outcome: Progressing  10/5/2022 1822 by Roseann Magana RN  Outcome: Progressing     Problem: ABCDS Injury Assessment  Goal: Absence of physical injury  10/5/2022 1824 by Roseann Magana RN  Outcome: Progressing  10/5/2022 1822 by Roseann Magana RN  Outcome: Progressing     Problem: Skin/Tissue Integrity  Goal: Absence of new skin breakdown  Description: 1. Monitor for areas of redness and/or skin breakdown  2. Assess vascular access sites hourly  3. Every 4-6 hours minimum:  Change oxygen saturation probe site  4. Every 4-6 hours:  If on nasal continuous positive airway pressure, respiratory therapy assess nares and determine need for appliance change or resting period.   10/5/2022 1824 by Roseann Magana RN  Outcome: Progressing  10/5/2022 1822 by Roseann Magana RN  Outcome: Progressing     Problem: Pain  Goal: Verbalizes/displays adequate comfort level or baseline comfort level  10/5/2022 1824 by Roseann Magana RN  Outcome: Progressing  10/5/2022 1822 by Roseann Magana RN  Outcome: Progressing     Problem: Chronic Conditions and Co-morbidities  Goal: Patient's chronic conditions and co-morbidity symptoms are monitored and maintained or improved  10/5/2022 1824 by Roseann Magana RN  Outcome: Progressing  10/5/2022 1822 by Roseann Magana RN  Outcome: Progressing     Problem: Safety - Medical Restraint  Goal: Remains free of injury from restraints (Restraint for Interference with Medical Device)  Description: INTERVENTIONS:  1. Determine that other, less restrictive measures have been tried or would not be effective before applying the restraint  2. Evaluate the patient's condition at the time of restraint application  3. Inform patient/family regarding the reason for restraint  4.  Q2H: Monitor safety, psychosocial status, comfort, nutrition and hydration  10/5/2022 1824 by Fredis Diallo RN  Outcome: Progressing  10/5/2022 1822 by Fredis Diallo RN  Outcome: Progressing  Flowsheets  Taken 10/5/2022 1800 by Fredis Diallo RN  Remains free of injury from restraints (restraint for interference with medical device):   Every 2 hours: Monitor safety, psychosocial status, comfort, nutrition and hydration   Inform patient/family regarding the reason for restraint   Evaluate the patient's condition at the time of restraint application   Determine that other, less restrictive measures have been tried or would not be effective before applying the restraint  Taken 10/5/2022 1600 by Fredis Diallo RN  Remains free of injury from restraints (restraint for interference with medical device):   Determine that other, less restrictive measures have been tried or would not be effective before applying the restraint   Evaluate the patient's condition at the time of restraint application   Inform patient/family regarding the reason for restraint   Every 2 hours: Monitor safety, psychosocial status, comfort, nutrition and hydration  Taken 10/5/2022 1400 by Fredis Diallo RN  Remains free of injury from restraints (restraint for interference with medical device):   Evaluate the patient's condition at the time of restraint application   Determine that other, less restrictive measures have been tried or would not be effective before applying the restraint   Inform patient/family regarding the reason for restraint   Every 2 hours: Monitor safety, psychosocial status, comfort, nutrition and hydration  Taken 10/5/2022 1200 by Efren Turner RN  Remains free of injury from restraints (restraint for interference with medical device):   Determine that other, less restrictive measures have been tried or would not be effective before applying the restraint   Evaluate the patient's condition at the time of restraint application   Inform patient/family regarding the reason for restraint   Every 2 hours: Monitor safety, psychosocial status, comfort, nutrition and hydration  Taken 10/5/2022 1000 by Efren Turner RN  Remains free of injury from restraints (restraint for interference with medical device):   Determine that other, less restrictive measures have been tried or would not be effective before applying the restraint   Evaluate the patient's condition at the time of restraint application   Inform patient/family regarding the reason for restraint   Every 2 hours: Monitor safety, psychosocial status, comfort, nutrition and hydration  Taken 10/5/2022 0800 by Efren Turner RN  Remains free of injury from restraints (restraint for interference with medical device):    Inform patient/family regarding the reason for restraint   Determine that other, less restrictive measures have been tried or would not be effective before applying the restraint   Evaluate the patient's condition at the time of restraint application   Every 2 hours: Monitor safety, psychosocial status, comfort, nutrition and hydration  Taken 10/5/2022 0600 by Christopher Marquez RN  Remains free of injury from restraints (restraint for interference with medical device): Determine that other, less restrictive measures have been tried or would not be effective before applying the restraint

## 2022-10-05 NOTE — TELEPHONE ENCOUNTER
PT RECEIVED CHEMO ON 9/29/22. ON 10/3/22 PT ADM TO [facility] WITH SEPTICEMIA AND HAS SINCE HAD SEIZURES. DTR CALLING AND STATES MOTHER IS ADMITTED. WRITER CALLED FOR MORE INFORMATION AND DTR STATES:  DTR CALLING FROM [place] (P) 1 [phone] ASKING FOR DR MARK TO CALL WITH UP-DATE ON PT'S CONDITION AND IF SHOULD COME TO [place]. DTR - LESLI DANIEL , AFTER WRITER CALLED HER BACK FOR MORE INFORMATION, STATES THAT SHE IS IN [place] AND IS ASKING IF SHE NEEDS TO COME TO [address] [place] [address], DUE TO Shashi Lemons, WHO RECENTLY PASSED AWAY, WAS CARING FOR MARK. DTR ASKING IF DR MARK IS AWARE PT IS ADMITTED. ALSO ASKING FOR MD TO UP-DATE HER. WRITER NOTIFIED [address] [facility], [address] [facility] NOTIFY DR MARK OF ABOVE INFORMATION.   MESSAGE SENT TO MD.

## 2022-10-06 LAB
ABSOLUTE EOS #: <0.03 K/UL (ref 0–0.44)
ABSOLUTE IMMATURE GRANULOCYTE: <0.03 K/UL (ref 0–0.3)
ABSOLUTE LYMPH #: 0.82 K/UL (ref 1.1–3.7)
ABSOLUTE MONO #: 0.31 K/UL (ref 0.1–1.2)
ANION GAP SERPL CALCULATED.3IONS-SCNC: 9 MMOL/L (ref 9–17)
BASOPHILS # BLD: 1 % (ref 0–2)
BASOPHILS ABSOLUTE: 0.04 K/UL (ref 0–0.2)
BUN BLDV-MCNC: 3 MG/DL (ref 6–20)
CALCIUM SERPL-MCNC: 8.5 MG/DL (ref 8.6–10.4)
CHLORIDE BLD-SCNC: 104 MMOL/L (ref 98–107)
CO2: 27 MMOL/L (ref 20–31)
CREAT SERPL-MCNC: 0.36 MG/DL (ref 0.5–0.9)
EOSINOPHILS RELATIVE PERCENT: 0 % (ref 1–4)
GFR SERPL CREATININE-BSD FRML MDRD: >60 ML/MIN/1.73M2
GLUCOSE BLD-MCNC: 113 MG/DL (ref 70–99)
HCT VFR BLD CALC: 37.7 % (ref 36.3–47.1)
HEMOGLOBIN: 12.5 G/DL (ref 11.9–15.1)
IMMATURE GRANULOCYTES: 0 %
LYMPHOCYTES # BLD: 18 % (ref 24–43)
MAGNESIUM: 1.6 MG/DL (ref 1.6–2.6)
MCH RBC QN AUTO: 30.7 PG (ref 25.2–33.5)
MCHC RBC AUTO-ENTMCNC: 33.2 G/DL (ref 28.4–34.8)
MCV RBC AUTO: 92.6 FL (ref 82.6–102.9)
MONOCYTES # BLD: 7 % (ref 3–12)
NRBC AUTOMATED: 0 PER 100 WBC
PDW BLD-RTO: 16.3 % (ref 11.8–14.4)
PLATELET # BLD: ABNORMAL K/UL (ref 138–453)
PLATELET, FLUORESCENCE: 142 K/UL (ref 138–453)
PLATELET, IMMATURE FRACTION: 1.8 % (ref 1.1–10.3)
POTASSIUM SERPL-SCNC: 3.5 MMOL/L (ref 3.7–5.3)
POTASSIUM SERPL-SCNC: 3.6 MMOL/L (ref 3.7–5.3)
RBC # BLD: 4.07 M/UL (ref 3.95–5.11)
RBC # BLD: ABNORMAL 10*6/UL
SEG NEUTROPHILS: 75 % (ref 36–65)
SEGMENTED NEUTROPHILS ABSOLUTE COUNT: 3.44 K/UL (ref 1.5–8.1)
SODIUM BLD-SCNC: 140 MMOL/L (ref 135–144)
VANCOMYCIN TROUGH: 10.1 UG/ML (ref 10–20)
WBC # BLD: 4.6 K/UL (ref 3.5–11.3)

## 2022-10-06 PROCEDURE — 6370000000 HC RX 637 (ALT 250 FOR IP): Performed by: NURSE PRACTITIONER

## 2022-10-06 PROCEDURE — 83735 ASSAY OF MAGNESIUM: CPT

## 2022-10-06 PROCEDURE — 95720 EEG PHY/QHP EA INCR W/VEEG: CPT | Performed by: PSYCHIATRY & NEUROLOGY

## 2022-10-06 PROCEDURE — 99291 CRITICAL CARE FIRST HOUR: CPT | Performed by: INTERNAL MEDICINE

## 2022-10-06 PROCEDURE — 97530 THERAPEUTIC ACTIVITIES: CPT

## 2022-10-06 PROCEDURE — 2000000000 HC ICU R&B

## 2022-10-06 PROCEDURE — 6370000000 HC RX 637 (ALT 250 FOR IP)

## 2022-10-06 PROCEDURE — 84132 ASSAY OF SERUM POTASSIUM: CPT

## 2022-10-06 PROCEDURE — 97166 OT EVAL MOD COMPLEX 45 MIN: CPT

## 2022-10-06 PROCEDURE — C9254 INJECTION, LACOSAMIDE: HCPCS | Performed by: STUDENT IN AN ORGANIZED HEALTH CARE EDUCATION/TRAINING PROGRAM

## 2022-10-06 PROCEDURE — 85025 COMPLETE CBC W/AUTO DIFF WBC: CPT

## 2022-10-06 PROCEDURE — 6360000002 HC RX W HCPCS: Performed by: STUDENT IN AN ORGANIZED HEALTH CARE EDUCATION/TRAINING PROGRAM

## 2022-10-06 PROCEDURE — 2580000003 HC RX 258: Performed by: STUDENT IN AN ORGANIZED HEALTH CARE EDUCATION/TRAINING PROGRAM

## 2022-10-06 PROCEDURE — 36415 COLL VENOUS BLD VENIPUNCTURE: CPT

## 2022-10-06 PROCEDURE — 97116 GAIT TRAINING THERAPY: CPT

## 2022-10-06 PROCEDURE — 80048 BASIC METABOLIC PNL TOTAL CA: CPT

## 2022-10-06 PROCEDURE — 6370000000 HC RX 637 (ALT 250 FOR IP): Performed by: STUDENT IN AN ORGANIZED HEALTH CARE EDUCATION/TRAINING PROGRAM

## 2022-10-06 PROCEDURE — 95714 VEEG EA 12-26 HR UNMNTR: CPT

## 2022-10-06 PROCEDURE — APPSS30 APP SPLIT SHARED TIME 16-30 MINUTES: Performed by: NURSE PRACTITIONER

## 2022-10-06 PROCEDURE — 97162 PT EVAL MOD COMPLEX 30 MIN: CPT

## 2022-10-06 PROCEDURE — 80202 ASSAY OF VANCOMYCIN: CPT

## 2022-10-06 PROCEDURE — 85055 RETICULATED PLATELET ASSAY: CPT

## 2022-10-06 PROCEDURE — 99232 SBSQ HOSP IP/OBS MODERATE 35: CPT | Performed by: PSYCHIATRY & NEUROLOGY

## 2022-10-06 RX ORDER — LACOSAMIDE 100 MG/1
100 TABLET ORAL 2 TIMES DAILY
Status: DISCONTINUED | OUTPATIENT
Start: 2022-10-06 | End: 2022-10-13

## 2022-10-06 RX ORDER — DOCUSATE SODIUM 50 MG AND SENNOSIDES 8.6 MG 8.6; 5 MG/1; MG/1
TABLET, FILM COATED ORAL
Qty: 60 TABLET | Refills: 2 | Status: SHIPPED | OUTPATIENT
Start: 2022-10-06

## 2022-10-06 RX ADMIN — SODIUM CHLORIDE, PRESERVATIVE FREE 10 ML: 5 INJECTION INTRAVENOUS at 09:51

## 2022-10-06 RX ADMIN — POTASSIUM CHLORIDE 10 MEQ: 7.46 INJECTION, SOLUTION INTRAVENOUS at 08:42

## 2022-10-06 RX ADMIN — LEVETIRACETAM 1000 MG: 500 TABLET, FILM COATED ORAL at 07:58

## 2022-10-06 RX ADMIN — FAMOTIDINE 20 MG: 20 TABLET, FILM COATED ORAL at 20:26

## 2022-10-06 RX ADMIN — Medication 5 MG: at 20:26

## 2022-10-06 RX ADMIN — SERTRALINE 100 MG: 50 TABLET, FILM COATED ORAL at 07:59

## 2022-10-06 RX ADMIN — LEVETIRACETAM 1000 MG: 500 TABLET, FILM COATED ORAL at 20:26

## 2022-10-06 RX ADMIN — LACOSAMIDE 200 MG: 10 INJECTION INTRAVENOUS at 10:54

## 2022-10-06 RX ADMIN — POTASSIUM CHLORIDE 10 MEQ: 7.46 INJECTION, SOLUTION INTRAVENOUS at 07:45

## 2022-10-06 RX ADMIN — PIPERACILLIN AND TAZOBACTAM 3375 MG: 3; .375 INJECTION, POWDER, FOR SOLUTION INTRAVENOUS at 13:24

## 2022-10-06 RX ADMIN — PIPERACILLIN AND TAZOBACTAM 3375 MG: 3; .375 INJECTION, POWDER, FOR SOLUTION INTRAVENOUS at 05:04

## 2022-10-06 RX ADMIN — SODIUM CHLORIDE: 9 INJECTION, SOLUTION INTRAVENOUS at 22:42

## 2022-10-06 RX ADMIN — ONDANSETRON 4 MG: 2 INJECTION INTRAMUSCULAR; INTRAVENOUS at 21:01

## 2022-10-06 RX ADMIN — LAMOTRIGINE 125 MG: 100 TABLET ORAL at 20:26

## 2022-10-06 RX ADMIN — SODIUM CHLORIDE: 9 INJECTION, SOLUTION INTRAVENOUS at 13:22

## 2022-10-06 RX ADMIN — POTASSIUM CHLORIDE 10 MEQ: 7.46 INJECTION, SOLUTION INTRAVENOUS at 06:22

## 2022-10-06 RX ADMIN — SODIUM CHLORIDE 100 MG: 9 INJECTION, SOLUTION INTRAVENOUS at 12:13

## 2022-10-06 RX ADMIN — SODIUM CHLORIDE: 9 INJECTION, SOLUTION INTRAVENOUS at 03:18

## 2022-10-06 RX ADMIN — SODIUM CHLORIDE, PRESERVATIVE FREE 10 ML: 5 INJECTION INTRAVENOUS at 20:26

## 2022-10-06 RX ADMIN — SODIUM CHLORIDE: 9 INJECTION, SOLUTION INTRAVENOUS at 03:16

## 2022-10-06 RX ADMIN — DOCUSATE SODIUM LIQUID 100 MG: 100 LIQUID ORAL at 08:00

## 2022-10-06 RX ADMIN — FUROSEMIDE 20 MG: 20 TABLET ORAL at 07:59

## 2022-10-06 RX ADMIN — LAMOTRIGINE 125 MG: 100 TABLET ORAL at 08:00

## 2022-10-06 RX ADMIN — FAMOTIDINE 20 MG: 20 TABLET, FILM COATED ORAL at 07:59

## 2022-10-06 RX ADMIN — Medication 1000 MG: at 00:03

## 2022-10-06 RX ADMIN — Medication 1000 MG: at 18:43

## 2022-10-06 RX ADMIN — APIXABAN 5 MG: 5 TABLET, FILM COATED ORAL at 20:26

## 2022-10-06 RX ADMIN — APIXABAN 5 MG: 5 TABLET, FILM COATED ORAL at 07:58

## 2022-10-06 RX ADMIN — POTASSIUM CHLORIDE 10 MEQ: 7.46 INJECTION, SOLUTION INTRAVENOUS at 05:18

## 2022-10-06 RX ADMIN — Medication 1000 MG: at 09:50

## 2022-10-06 RX ADMIN — LACOSAMIDE 100 MG: 100 TABLET, FILM COATED ORAL at 20:26

## 2022-10-06 RX ADMIN — PIPERACILLIN AND TAZOBACTAM 3375 MG: 3; .375 INJECTION, POWDER, FOR SOLUTION INTRAVENOUS at 20:28

## 2022-10-06 ASSESSMENT — PAIN SCALES - GENERAL
PAINLEVEL_OUTOF10: 0
PAINLEVEL_OUTOF10: 0

## 2022-10-06 NOTE — PROGRESS NOTES
Neurology Nurse Practitioner Progress Note      INTERVAL HISTORY: This is a 59 y.o.  female admitted 10/3/2022 for acute onset altered mentation. This is a follow-up neurology progress note. The patient was examined and the chart was reviewed. Discussed with the RN. There were no acute events overnight. At the time of my visit, patient was sitting up at the side of the bed, working with OT staff. Patient was oriented to self, thought she was at [facility], then [facility], eventually recalled [place]. Later she got bit agitated, fixated on her name and kept repeating it with every question. She did not participate in examination. She appeared confused. HPI: Jimbo Martinez is a 59 y.o. 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 Daughter reported that on the morning of arrival they let her mother sleep in. When they went in to check on her later patient was difficult to arouse. Patient's other daughter saw the patient vomiting on her bed through camera and then she became incontinent and behaving in an erratic way. Seizures were witnessed. EMS were called. Last known well was 2300 the night prior. Patient has prior history of PRES 10/2020 with new onset seizure disorder during that time. Reportedly patient has had multiple hospitalization for breakthrough seizures with negative work-up and AED adjustments. Currently she has been taking Keppra 1 g twice daily and Lamictal 125 mg twice daily.   Neurology was consulted on 10/4 for ongoing disorientation and agitation, after extubation on 10/4.      lacosamide (VIMPAT) IVPB  100 mg IntraVENous BID    levETIRAcetam  1,000 mg Oral BID    famotidine  20 mg Oral BID    vancomycin  1,000 mg IntraVENous Q8H    docusate  100 mg Oral Daily    apixaban  5 mg Oral BID    furosemide  20 mg Oral Daily    lamoTRIgine  125 mg Oral BID    sertraline  100 mg Oral Daily    melatonin  5 mg Oral Nightly    sodium chloride flush  5-40 mL IntraVENous 2 times per day    piperacillin-tazobactam  3,375 mg IntraVENous Q8H    vancomycin (VANCOCIN) intermittent dosing (placeholder)   Other RX Placeholder       Past Medical History:   Diagnosis Date    Anemia     Bleeding 10/2020    intra-abdominal bleeding -due to splenic mass with GI infiltration. Status post embolization    Cervical cancer (HCC)     Depression     Diabetes mellitus ([address]     GERD (gastroesophageal reflux disease)     Hx of blood clots     Hypertension     Metastatic cancer ([facility] 10/2020    extensive intraabdominal and splenic involvement and lung mets. Ovarian cancer ([facility]     low grade serous ovarian carcinoma    Post chemo evaluation     2007: Chemo via med onc (Dr. Nohemy Birmingham), 2008: Sonia Rink due to rising CA-125, 2013: intraperitoneal chemo,12/2015: Ca125 - 25     PRES (posterior reversible encephalopathy syndrome)     Splenic lesion        Past Surgical History:   Procedure Laterality Date    ABSCESS DRAINAGE  2013    Franca rectal    ANUS SURGERY      ANAL FISSURECTOMY    CARDIAC CATHETERIZATION      COLECTOMY  03/2013    ex lap, tumor debulking, transverse colectomy w reanastamosis, subgastric omentectomy, intraperitoneal port placement    HYSTERECTOMY, TOTAL ABDOMINAL (CERVIX REMOVED)      IR EMBOLIZATION HEMORRHAGE  10/05/2020    intra-abdominal bleeding -due to splenic mass with GI infiltration. Status post embolization [employer] interlock coils x7. mri condtional 3t ok, safe immediately post implant.     IR PORT PLACEMENT EQUAL OR GREATER THAN 5 YEARS  08/24/2020    IR PORT PLACEMENT EQUAL OR GREATER THAN 5 YEARS 8/24/2020 Mark Fuentes MD [facility]    PORT SURGERY      IP Port    TONSILLECTOMY         PHYSICAL EXAM:    Blood pressure (!) 148/81, pulse 92, temperature 98.1 °F (36.7 °C), temperature source Oral, resp. rate 21, height 5' 5\" (1.651 m), weight 153 lb 7 oz (69.6 kg), SpO2 98 %. ROS: Unable to perform due to patient's mentation        Neurological Examination:  Mental status   Patient was oriented to self, thought she was at [place], then [facility], eventually recalled [facility]. Later she got bit agitated, fixated on her name and kept repeating it with every question. She appeared confused. Did not follow any commands   Cranial nerves Grossly intact   Motor function  Strength: Patient has been using all limbs purposefully and equally  Normal bulk and tone                Sensory function Grossly intact     Cerebellar No visible tremors   Reflex function Deferred   Gait                  Not tested       DATA    Lab Results   Component Value Date    WBC 4.6 10/06/2022    RBC 4.07 10/06/2022    HGB 12.5 10/06/2022    HCT 37.7 10/06/2022    PLT See Reflexed IPF Result 10/06/2022    ALT 9 10/03/2022    AST 18 10/03/2022     10/06/2022    K 3.6 (L) 10/06/2022    MG 1.6 10/06/2022    PHOS 3.6 06/22/2021     10/06/2022    AMMONIA 36 10/03/2022    CREATININE 0.36 (L) 10/06/2022    BUN 3 (L) 10/06/2022    CO2 27 10/06/2022    TSH 2.93 03/09/2021    INR 1.2 05/03/2022    [id number] 654 06/04/2022    FOLATE 8.7 06/04/2022    LABA1C 5.2 04/13/2022     Lab Results   Component Value Date    CHOL 131 03/10/2021     Lab Results   Component Value Date    TRIG 147 03/10/2021     Lab Results   Component Value Date    HDL 33 (L) 03/10/2021     Lab Results   Component Value Date    LDLCHOLESTEROL 69 03/10/2021     Lab Results   Component Value Date    VLDL NOT REPORTED 03/10/2021     Lab Results   Component Value Date    CHOLHDLRATIO 4.0 03/10/2021         DIAGNOSTIC DATA:  CT HEAD (10/3/2022): No acute intracranial abnormality       MRI BRAIN (10/4/2022):   1.  New subtle focus of T2/FLAIR hyperintensity in lateral L frontal cortex & subcortical white matter    is nonspecific and may reflect sequela of prior infarct or mild PRES. 2. Stable small foci of R occipital encephalomalacia; sequela of prior insult. MRI BRAIN W/WO: Held for now due to mild improvement in mentation      CTA HEAD & NECK (10/3/2022): No LVO      ECHO (2/22/2022): EF 55%      LTME (10/4 -   Day 1: During the recording, the patient had 1 subclinical seizure lasting about 40 seconds originating from L hemisphere. The interictal EEG was abnormal due to diffuse polymorphic theta slowing suggesting mild to moderate encephalopathy. Continuous L hemispheric slowing suggested underlying structural defect. frequent L central parietal sharp waves and lateralized periodic discharges conferred an increased risk for focal onset seizures    Day 2: During the recording, the patient had 4 electroclinical seizures with L hand flapping & L head deviation. Electrographically, these seizures were similar to yesterday seizures. The interictal EEG was abnormal due to diffuse polymorphic theta slowing suggesting mild to moderate encephalopathy. Continuous L hemispheric slowing suggested underlying structural defect. Frequent L central parietal sharp waves & lateralized periodic discharges (LPDs) conferred an increased risk for focal onset seizures    Day 3: During the recording, the patient had 3 L hemispheric onset seizures. Electrographically, these seizures were similar to yesterday seizures. The interictal EEG was abnormal due to diffuse polymorphic theta slowing suggesting mild to moderate encephalopathy. Continuous L hemispheric slowing suggested underlying structural defect.  Frequent L central parietal sharp waves & lateralized periodic discharges (LPDs) conferred an increased risk for focal onset seizures                        IMPRESSION: 59 y.o.  female admitted with  Acute metabolic encephalopathy in the setting of HTN urgency (150/122 mm Hg), febrile illness with +SIRS criteria & subclinical status. Got extubated (10/4); she was oriented to self, she seemed confused, unaware of the situation; later she got bit agitated, fixated on her name & kept repeating it with every question; she did not participate in examination    MRI brain - hyperintensity in lateral L frontal cortex & subcortical white matter; mild PRES? LTME - total of 8 subclinical seizures along with mild to moderate encephalopathy    Prior Hx PRES with new onset seizure disorder in 10/2020    HTN urgency    Chronic DVT; on Eliquis 5 mg BID    Hx recurrent ovarian CA with peritoneal & lung mets, intra-abdominal bleeding d/t splenic mass with GI infiltration s/p embolization (10/2020), HTN, DM, GERD, depression          PLAN:  MRI brain w/wo contrast - for now due to mild improvement in mentation. We will continue to observe her    Continue LTME    Continue Vimpat 100 mg BID PO (switched from IVPB), Keppra 1 g BID PO & Lamictal 125 BID    Patient is on ATBs    Continue PT/OT    Will follow    Please note that this note was generated using a voice recognition dictation software. Although every effort was made to ensure the accuracy of this automated transcription, some errors in transcription may have occurred.

## 2022-10-06 NOTE — PROGRESS NOTES
Occupational Therapy  Facility/Department: [facility]  Occupational Therapy Initial Assessment    Name: Shama Villatoro  : 1965  MRN: [id number]  Date of Service: 10/6/2022    Discharge Recommendations:  Patient would benefit from continued therapy after discharge          Patient Diagnosis(es): The primary encounter diagnosis was Septicemia [facility]). A diagnosis of Altered mental status, unspecified altered mental status type was also pertinent to this visit. Past Medical History:  has a past medical history of Anemia, Bleeding, Cervical cancer ([facility] [address] Depression, Diabetes mellitus ([facility] [address] GERD (gastroesophageal reflux disease), Hx of blood clots, Hypertension, Metastatic cancer ([facility] [address] Ovarian cancer ([facility] [address] Post chemo evaluation, PRES (posterior reversible encephalopathy syndrome), and Splenic lesion. Past Surgical History:  has a past surgical history that includes Hysterectomy, total abdominal; Port Surgery; Tonsillectomy; IR PORT PLACEMENT > 5 YEARS (2020); Anus surgery; Abscess Drainage (); colectomy (2013); IR EMBOLIZATION HEMORRHAGE (10/05/2020); and Cardiac catheterization. Assessment   Performance deficits / Impairments: Decreased functional mobility ; Decreased ADL status; Decreased high-level IADLs;Decreased safe awareness;Decreased balance;Decreased cognition;Decreased coordination  Assessment: Pt grossly limited by cognition throughout all functional mobility and ADL tasks this date. Pt is expected to require skilled OT services during their acute hospitalization stay to address the above noted deficits through skilled occupational therapy intervention for promotion of increased independence throughout ADLs, IADLs and functional mobility tasks.    Prognosis: Fair  Decision Making: Medium Complexity  REQUIRES OT FOLLOW-UP: Yes  Activity Tolerance  Activity Tolerance: Treatment limited secondary to decreased cognition        Plan   Occupational Therapy Plan  Times Per Week: 2-3x/wk  Current Treatment Recommendations: Balance training, Functional mobility training, Endurance training, Safety education & training, Pain management, Cognitive reorientation, Patient/Caregiver education & training, Self-Care / ADL, Equipment evaluation, education, & procurement, Cognitive/Perceptual training     Restrictions  Restrictions/Precautions  Restrictions/Precautions: General Precautions  Required Braces or Orthoses?: No  Position Activity Restriction  Other position/activity restrictions: LTME, extubated 10/4    Subjective   General  Patient assessed for rehabilitation services?: Yes  Family / Caregiver Present: No  General Comment  Comments: RN ok'd for OT/PT eval this PM. Pt agreeable to session, pleasent/cooperative throughout. Pt denies any pain. Social/Functional History  Social/Functional History  Lives With: Son  Type of Home: House  Home Layout: Two level, Able to Live on Main level with bedroom/bathroom  Home Access: Stairs to enter without rails  Entrance Stairs - Number of Steps: 5  Bathroom Shower/Tub: Tub/Shower unit  Bathroom Toilet: Standard  Bathroom Equipment: Grab bars in shower  Home Equipment: klaus Rubin  ADL Assistance: Needs assistance  Homemaking Assistance: Independent  Homemaking Responsibilities: No  Ambulation Assistance: Independent  Transfer Assistance: Independent  Active : No  Patient's  Info: daughter drives  Occupation: On disability  Leisure & Hobbies: crafts  Additional Comments: Social-functional information is from previous therapy note in 06/2022. Pt is a poor historian this date. Objective         Safety Devices  Type of Devices: Gait belt;Left in bed;Call light within reach; Bed alarm in place; Patient at risk for falls;Nurse notified  Restraints  Restraints Initially in Place: Yes  Restraints: 4 bed rails d/t seizure precautions    Balance  Sitting:  (SBA for static/dynamic sitting balance while engaging in ADLs and ROM/MMT assessments ~15 minutes EOB)  Standing:  (CGA for static standing EOB for ~2 minutes)    Gait  Overall Level of Assistance: Minimum assistance (No LOB, but required assist to weightshift and manage walker d/t pt not following commands appropriately. Completed mobility forward/back at EOB ~3x.)     AROM: Within functional limits  Strength: Within functional limits (BUEs grossly 4+/5)  Coordination: Generally decreased, functional  Tone: Normal  Sensation: Intact (Denies any numbness/tingling)    ADL  Feeding: Stand by assistance;Setup;Verbal cueing; Increased time to complete  Grooming: Stand by assistance;Setup;Verbal cueing  Grooming Skilled Clinical Factors: VCs for appropriate sequencing of oral hygiene. Pt attempting to brush teeth with toothpaste and attempted to open cap on toothpaste after already completing that step. Very difficult time discontinuing tasks. UE Bathing: Minimal assistance;Verbal cueing;Setup; Increased time to complete  LE Bathing: Verbal cueing;Minimal assistance;Setup; Increased time to complete  UE Dressing: Minimal assistance;Verbal cueing;Setup; Increased time to complete  LE Dressing: Minimal assistance;Verbal cueing;Setup; Increased time to complete  LE Dressing Skilled Clinical Factors: SBA to don B socks EOB with VC to initiate; expected to require increased assist with standing dressing tasks  Toileting: Increased time to complete;Minimal assistance;Setup  Additional Comments: Grossly limited by cognition     Activity Tolerance  Activity Tolerance: Treatment limited secondary to decreased cognition    Bed mobility  Supine to Sit: Contact guard assistance (w/ max verbal cues for initiation/sequencing)  Sit to Supine:  Moderate assistance (D/t pt fixating on name, unable to follow commands to return supine)  Scooting: Minimal assistance    Transfers  Sit to stand: Minimal assistance  Stand to sit: Minimal assistance  Transfer Comments: RW    Vision  Vision:  (SMILEY d/t cognition.)  Hearing  Hearing:  (SMILEY d/t cognition.)    Cognition  Overall Cognitive Status: Exceptions  Arousal/Alertness: Inconsistent responses to stimuli  Following Commands: Follows one step commands with increased time; Follows one step commands with repetition; Inconsistently follows commands  Attention Span: Unable to maintain attention; Difficulty attending to directions  Memory: Decreased recall of biographical Information;Decreased recall of recent events (\"I'm having trouble with my memory\")  Safety Judgement: Decreased awareness of need for assistance;Decreased awareness of need for safety  Problem Solving: Decreased awareness of errors;Assistance required to identify errors made;Assistance required to generate solutions  Insights: Not aware of deficits  Initiation: Requires cues for all  Sequencing: Requires cues for all  Cognition Comment: Pt difficult to redirect throughout; however NP came in for assessment and pt was unable to stop fixating on repeating full name from that point on                  Education Provided Comments: Pt educated on OT role, OT POC, activity promotion, reorientation, safety, transfer training, walker management-poor return d/t cognition  LUE AROM (degrees)  LUE AROM : WFL  Left Hand AROM (degrees)  Left Hand AROM: WFL  RUE AROM (degrees)  RUE AROM : WFL  Right Hand AROM (degrees)  Right Hand AROM: WFL          AM-PAC Score        AM-PAC Inpatient Daily Activity Raw Score: 18 (10/06/22 1723)  AM-PAC Inpatient ADL T-Scale Score : 38.66 (10/06/22 1723)  ADL Inpatient CMS 0-100% Score: 46.65 (10/06/22 1723)  ADL Inpatient CMS G-Code Modifier : CK (10/06/22 1723)        Goals  Short Term Goals  Time Frame for Short Term Goals: By discharge pt will:  Short Term Goal 1: Demo bed mobility sit<>supine with SBA and 1 VC  Short Term Goal 2: Demo functional sit<>stand transfers and functional mobility with SBA, LRD PRN and 0 VCs  Short Term Goal 3: Demo UB ADLs with SBA, setup and no

## 2022-10-06 NOTE — PROGRESS NOTES
Physical Therapy  Facility/Department: [facility]  Physical Therapy Initial Assessment    Name: Juany Carr  : 1965  MRN: [id number]  Date of Service: 10/6/2022  Chief Complaint   Patient presents with    Altered Mental Status      Discharge Recommendations:  Patient would benefit from continued therapy after discharge   PT Equipment Recommendations  Equipment Needed: Yes  Mobility Devices: Maria Teresa Bolds: Rolling      Patient Diagnosis(es): The primary encounter diagnosis was Septicemia ([facility] [address] A diagnosis of Altered mental status, unspecified altered mental status type was also pertinent to this visit. Past Medical History:  has a past medical history of Anemia, Bleeding, Cervical cancer ([facility] [address] Depression, Diabetes mellitus ([address] GERD (gastroesophageal reflux disease), Hx of blood clots, Hypertension, Metastatic cancer ([facility] [address] Ovarian cancer ([facility] [address] Post chemo evaluation, PRES (posterior reversible encephalopathy syndrome), and Splenic lesion. Past Surgical History:  has a past surgical history that includes Hysterectomy, total abdominal; Port Surgery; Tonsillectomy; IR PORT PLACEMENT > 5 YEARS (2020); Anus surgery; Abscess Drainage (); colectomy (2013); IR EMBOLIZATION HEMORRHAGE (10/05/2020); and Cardiac catheterization. Assessment   Body Structures, Functions, Activity Limitations Requiring Skilled Therapeutic Intervention: Decreased functional mobility ; Decreased strength;Decreased cognition;Decreased safe awareness;Decreased endurance;Decreased balance  Assessment: Pt with mobility deficits requiring min-A to ambulate 12 feet with a RW. Pt with significantly impaired cognition this date, repeating her name when asked most questions. Pt is unsteady with ambulation this date and a high fall risk secondary to significantly impaired safety awareness, decreased standing balance, and decreased coordination.   Pt would benefit from additional therapy upon discharge to maximize functional independence. Pt will require 24 hour supervision and assistance with mobility upon discharge. Therapy Prognosis: Good  Decision Making: Medium Complexity  Requires PT Follow-Up: Yes  Activity Tolerance  Activity Tolerance: Treatment limited secondary to decreased cognition     Plan   Physcial Therapy Plan  General Plan:  (5-6x/week)  Current Treatment Recommendations: Strengthening, Balance training, Functional mobility training, Transfer training, Patient/Caregiver education & training, Safety education & training, Home exercise program, Equipment evaluation, education, & procurement, Therapeutic activities, Endurance training, Gait training, Stair training  Safety Devices  Type of Devices: Gait belt, Left in bed, Call light within reach, Bed alarm in place, Patient at risk for falls, Nurse notified, All fall risk precautions in place  Restraints  Restraints Initially in Place: Yes  Restraints: 4 bed rails     Restrictions  Restrictions/Precautions  Restrictions/Precautions: General Precautions  Required Braces or Orthoses?: No  Position Activity Restriction  Other position/activity restrictions: LTME, extubated 10/4     Subjective   General  Patient assessed for rehabilitation services?: Yes  Response To Previous Treatment: Not applicable  Family / Caregiver Present: No  Follows Commands: Impaired (inconsistently follows simple commands with repetition.)  Subjective  Subjective: Pt supine in bed and agreeable to therapy, RN agreeable to therapy. Pt confused and slightly agitated but attempts to cooperate with therapy this date.   Pt denies pain at rest.         Social/Functional History  Social/Functional History  Lives With: Son  Type of Home: House  Home Layout: Two level, Able to Live on Main level with bedroom/bathroom  Home Access: Stairs to enter without rails  Entrance Stairs - Number of Steps: 5  Bathroom Shower/Tub: Tub/Shower unit  Bathroom Toilet: Standard  Bathroom Equipment: Grab bars in shower  Home Equipment: Walker, rolling  ADL Assistance: Needs assistance  Homemaking Assistance: Independent  Homemaking Responsibilities: No  Ambulation Assistance: Independent  Transfer Assistance: Independent  Active : No  Patient's  Info: daughter drives  Occupation: On disability  Leisure & Hobbies: crafts  Additional Comments: Social-functional information is from previous therapy note in 06/2022. Pt is a poor historian this date. Vision/Hearing  Vision  Vision:  (SMILEY d/t cognition.)  Hearing  Hearing:  (SMILEY d/t cognition.)    Cognition   Cognition  Overall Cognitive Status: Exceptions  Arousal/Alertness: Inconsistent responses to stimuli  Following Commands: Follows one step commands with increased time; Follows one step commands with repetition; Inconsistently follows commands  Attention Span: Unable to maintain attention; Difficulty attending to directions  Memory: Decreased recall of biographical Information;Decreased recall of recent events  Safety Judgement: Decreased awareness of need for assistance;Decreased awareness of need for safety  Problem Solving: Decreased awareness of errors;Assistance required to identify errors made;Assistance required to generate solutions  Insights: Not aware of deficits  Initiation: Requires cues for all  Sequencing: Requires cues for all     Objective                 AROM RLE (degrees)  RLE AROM: WFL  AROM LLE (degrees)  LLE AROM : WFL  AROM RUE (degrees)  RUE AROM : WFL  AROM LUE (degrees)  LUE AROM : WFL  Strength RLE  Strength RLE: WFL  Comment: Grossly 4/5  Strength LLE  Strength LLE: WFL  Comment: Grossly 4/5  Strength RUE  Comment: Co-eval with OT, see OT note for UE detail. Strength LUE  Comment: Co-eval with OT, see OT note for UE detail. Bed mobility  Supine to Sit: Contact guard assistance  Sit to Supine: Moderate assistance  Scooting: Minimal assistance  Bed Mobility Comments: Max verbal cues for sequencing and initiation.   HOB elevated ~30 degrees  Transfers  Sit to Stand: Contact guard assistance  Stand to Sit: Contact guard assistance  Comment: Verbal cues for hand placement. Verbal cues for sequencing and intiation. Ambulation  Surface: Level tile  Device: Rolling Walker  Assistance: Minimal assistance  Quality of Gait: unsteady, decreased stride length, increased TAWANDA. Gait Deviations: Slow Cynthia;Decreased step length  Distance: 4 feet forward/retro x3  Comments: Max verbal cues for sequencing. More Ambulation?: No  Stairs/Curb  Stairs?: No     Balance  Posture: Fair  Sitting - Static: Fair;+  Sitting - Dynamic: Fair  Standing - Static: Fair;-  Standing - Dynamic: Fair;-  Comments: standing balance assessed while using a RW                                                        AM-PAC Score  AM-PAC Inpatient Mobility Raw Score : 16 (10/06/22 1630)  AM-PAC Inpatient T-Scale Score : 40.78 (10/06/22 1630)  Mobility Inpatient CMS 0-100% Score: 54.16 (10/06/22 1630)  Mobility Inpatient CMS G-Code Modifier : CK (10/06/22 1630)            Goals  Short Term Goals  Time Frame for Short Term Goals: 14 visits  Short Term Goal 1: Pt will perform sit<>stand transfer with SBA. Short Term Goal 2: Pt will ambulate 125 feet with least restrictive device and SBA to increase functional independence. Short Term Goal 3: Pt will demonstrate fair+ standing balance to decrease fall risk. Short Term Goal 4: Pt will tolerate a 35 minute therapy session to promote increased endurance. Education  Patient Education  Education Given To: Patient  Education Provided: Role of Therapy;Transfer Training;Equipment;Plan of Care; Fall Prevention Strategies  Education Method: Verbal  Barriers to Learning: Cognition  Education Outcome: Continued education needed      Therapy Time   Individual Concurrent Group Co-treatment   Time In 9113         Time Out 1459         Minutes 33         Timed Code Treatment Minutes: KOKO Peguero 20, PT

## 2022-10-06 NOTE — PLAN OF CARE
Problem: Respiratory - Adult  Goal: Achieves optimal ventilation and oxygenation  10/6/2022 0833 by Afshan Naranjo RN  Outcome: Progressing  Flowsheets (Taken 10/6/2022 0800)  Achieves optimal ventilation and oxygenation: Assess for changes in respiratory status  10/6/2022 0017 by Leatha Meredith RN  Outcome: Progressing  Flowsheets (Taken 10/5/2022 2000)  Achieves optimal ventilation and oxygenation: Assess for changes in respiratory status     Problem: Discharge Planning  Goal: Discharge to home or other facility with appropriate resources  10/6/2022 0833 by Afshan Naranjo RN  Outcome: Progressing  Flowsheets (Taken 10/6/2022 0800)  Discharge to home or other facility with appropriate resources: Identify barriers to discharge with patient and caregiver  10/6/2022 0017 by Leatha Meredith RN  Outcome: Not Progressing  Flowsheets (Taken 10/5/2022 2000)  Discharge to home or other facility with appropriate resources: Identify barriers to discharge with patient and caregiver     Problem: Safety - Adult  Goal: Free from fall injury  10/6/2022 0833 by Afshan Naranjo RN  Outcome: Progressing  10/6/2022 0017 by Leatha Meredith RN  Outcome: Progressing  Flowsheets (Taken 10/6/2022 0015)  Free From Fall Injury: Instruct family/caregiver on patient safety     Problem: ABCDS Injury Assessment  Goal: Absence of physical injury  10/6/2022 0833 by Afshan Naranjo RN  Outcome: Progressing  10/6/2022 0017 by Leatha Meredith RN  Outcome: Progressing  Flowsheets (Taken 10/6/2022 0015)  Absence of Physical Injury: Implement safety measures based on patient assessment     Problem: Skin/Tissue Integrity  Goal: Absence of new skin breakdown  Description: 1. Monitor for areas of redness and/or skin breakdown  2. Assess vascular access sites hourly  3. Every 4-6 hours minimum:  Change oxygen saturation probe site  4.   Every 4-6 hours:  If on nasal continuous positive airway pressure, respiratory therapy assess nares and determine need for appliance change or resting period. 10/6/2022 3090 by Lory Garcia RN  Outcome: Progressing  10/6/2022 0017 by Dillon Oglesby RN  Outcome: Progressing     Problem: Pain  Goal: Verbalizes/displays adequate comfort level or baseline comfort level  10/6/2022 0833 by Lory Garcia RN  Outcome: Progressing  10/6/2022 0017 by Dillon Oglesby RN  Outcome: Progressing     Problem: Chronic Conditions and Co-morbidities  Goal: Patient's chronic conditions and co-morbidity symptoms are monitored and maintained or improved  10/6/2022 0833 by Lory Garcia RN  Outcome: Progressing  Flowsheets (Taken 10/6/2022 0800)  Care Plan - Patient's Chronic Conditions and Co-Morbidity Symptoms are Monitored and Maintained or Improved: Monitor and assess patient's chronic conditions and comorbid symptoms for stability, deterioration, or improvement  10/6/2022 0017 by Dillon Olgesby RN  Outcome: Progressing  Flowsheets (Taken 10/5/2022 2000)  Care Plan - Patient's Chronic Conditions and Co-Morbidity Symptoms are Monitored and Maintained or Improved: Monitor and assess patient's chronic conditions and comorbid symptoms for stability, deterioration, or improvement     Problem: Safety - Medical Restraint  Goal: Remains free of injury from restraints (Restraint for Interference with Medical Device)  Description: INTERVENTIONS:  1. Determine that other, less restrictive measures have been tried or would not be effective before applying the restraint  2. Evaluate the patient's condition at the time of restraint application  3. Inform patient/family regarding the reason for restraint  4.  Q2H: Monitor safety, psychosocial status, comfort, nutrition and hydration  10/6/2022 0833 by Lory Garcia RN  Outcome: Progressing  10/6/2022 0017 by Dillon Oglesby RN  Outcome: Not Progressing  Flowsheets (Taken 10/5/2022 2000)  Remains free of injury from restraints (restraint for interference with medical device): Every 2 hours: Monitor safety, psychosocial status, comfort, nutrition and hydration     Problem: Discharge Planning  Goal: Discharge to home or other facility with appropriate resources  10/6/2022 0833 by Anne Gary RN  Outcome: Progressing  Flowsheets (Taken 10/6/2022 0800)  Discharge to home or other facility with appropriate resources: Identify barriers to discharge with patient and caregiver  10/6/2022 0017 by Kallie Adam RN  Outcome: Not Progressing  Flowsheets (Taken 10/5/2022 2000)  Discharge to home or other facility with appropriate resources: Identify barriers to discharge with patient and caregiver     Problem: Safety - Medical Restraint  Goal: Remains free of injury from restraints (Restraint for Interference with Medical Device)  Description: INTERVENTIONS:  1. Determine that other, less restrictive measures have been tried or would not be effective before applying the restraint  2. Evaluate the patient's condition at the time of restraint application  3. Inform patient/family regarding the reason for restraint  4.  Q2H: Monitor safety, psychosocial status, comfort, nutrition and hydration  10/6/2022 0833 by Anne Gary RN  Outcome: Progressing  10/6/2022 0017 by Kallie Adam RN  Outcome: Not Progressing  Flowsheets (Taken 10/5/2022 2000)  Remains free of injury from restraints (restraint for interference with medical device): Every 2 hours: Monitor safety, psychosocial status, comfort, nutrition and hydration

## 2022-10-06 NOTE — PROCEDURES
LONG-TERM EEG-VIDEO 5656 [address]    Patient: Gui Pollack [facility]  Age: 59 y.o. MRN: [id number]    Referring Physician: No ref. provider found  History: The patient is a 59 y.o. female who presented breakthrough seizure/encephalopathy. This long-term video-EEG monitoring study was performed to determine the nature of the patient's clinical events. The patient is on neuroactive medications.    Gui Pollack Jose   Current Facility-Administered Medications   Medication Dose Route Frequency Provider Last Rate Last Admin    levETIRAcetam (KEPPRA) tablet 1,000 mg  1,000 mg Oral BID Celia Manjeet, DO   1,000 mg at 10/06/22 0758    famotidine (PEPCID) tablet 20 mg  20 mg Oral BID Josiephine Greaser, DO   20 mg at 10/06/22 0759    vancomycin (VANCOCIN) 1000 mg in sodium chloride 0.9% 250 mL IVPB  1,000 mg IntraVENous Q8H Josiephine Greaser, DO   Stopped at 10/06/22 0103    docusate (COLACE) 50 MG/5ML liquid 100 mg  100 mg Oral Daily Grant Smith MD   100 mg at 10/06/22 0800    apixaban (ELIQUIS) tablet 5 mg  5 mg Oral BID Josiephine Greaser, DO   5 mg at 10/06/22 0758    furosemide (LASIX) tablet 20 mg  20 mg Oral Daily Josiephine Greaser, DO   20 mg at 10/06/22 0759    lamoTRIgine (LAMICTAL) tablet 125 mg  125 mg Oral BID Josiephine Greaser, DO   125 mg at 10/06/22 0800    oxyCODONE-acetaminophen (PERCOCET) 5-325 MG per tablet 1 tablet  1 tablet Oral Q4H PRN Josiephine Greaser, DO        sertraline (ZOLOFT) tablet 100 mg  100 mg Oral Daily Josiephine Greaser, DO   100 mg at 10/06/22 0759    melatonin tablet 5 mg  5 mg Oral Nightly Josiephine Greaser, DO   5 mg at 10/05/22 2019    morphine (PF) injection 2 mg  2 mg IntraVENous Q4H PRN Josiephine Greaser, DO   2 mg at 10/05/22 0228    albuterol (PROVENTIL) nebulizer solution 2.5 mg  2.5 mg Nebulization Q6H PRN Jim Herrera MD   2.5 mg at 10/05/22 2118    midazolam PF (VERSED) injection 1 mg  1 mg IntraVENous Q2H PRN Venessa Burton MD   1 mg at 10/05/22 0515    racepinephrine HCl (VAPONEFPRIN) 2.25 % nebulizer solution NEBU 11.25 mg  11.25 mg Nebulization Q3H PRN Soheila Patterson MD        sodium chloride nebulizer 0.9 % solution 3 mL  3 mL Nebulization Q4H PRN Soheila Patterson MD        sodium chloride flush 0.9 % injection 5-40 mL  5-40 mL IntraVENous 2 times per day Juani Legions, DO   10 mL at 10/05/22 0826    sodium chloride flush 0.9 % injection 5-40 mL  5-40 mL IntraVENous PRN Juani Legions, DO        0.9 % sodium chloride infusion   IntraVENous PRN Juani Legions, DO 10 mL/hr at 10/06/22 0318 New Bag at 10/06/22 0318    ondansetron (ZOFRAN-ODT) disintegrating tablet 4 mg  4 mg Oral Q8H PRN Juani Legions, DO        Or    ondansetron [facility]) injection 4 mg  4 mg IntraVENous Q6H PRN Juani Legions, DO   4 mg at 10/05/22 2018    polyethylene glycol (GLYCOLAX) packet 17 g  17 g Oral Daily PRN Juani Legions, DO        acetaminophen (TYLENOL) tablet 650 mg  650 mg Oral Q6H PRN Juani Legions, DO   650 mg at 10/05/22 0703    Or    acetaminophen (TYLENOL) suppository 650 mg  650 mg Rectal Q6H PRN Juani Legions, DO        0.9 % sodium chloride infusion   IntraVENous Continuous Juani Legions,  mL/hr at 10/06/22 0316 New Bag at 10/06/22 0316    potassium chloride 20 mEq/50 mL IVPB (Central Line)  20 mEq IntraVENous PRN Juani Legions, DO   Stopped at 10/05/22 1550    Or    potassium chloride 10 mEq/100 mL IVPB (Peripheral Line)  10 mEq IntraVENous PRN Juani Legions,  mL/hr at 10/06/22 0745 10 mEq at 10/06/22 0745    piperacillin-tazobactam (ZOSYN) 3,375 mg in dextrose 5 % 50 mL IVPB extended infusion (mini-bag)  3,375 mg IntraVENous Q8H Juani Legions, DO 12.5 mL/hr at 10/06/22 0504 3,375 mg at 10/06/22 0504    vancomycin (VANCOCIN) intermittent dosing (placeholder)   Other RX Placeholder Juani Desai DO   Given at 10/03/22 1900     Technical Description:  This is a 21-channel digital EEG recording with time-locked video. Electrodes were placed in accordance with the 10-20 International System of Electrode Placement. Single lead EKG monitoring was included. Baseline EEG Recording:  A formal baseline EEG recording was not obtained. Day 1 - 10/4/22, starting at 18:32    Interictal EEG Samples: The background activity over the right hemisphere consisted of 3 to 7 Hz of polymorphic theta activity of 20-30 µV. The activity over the left hemisphere was relatively less well-modulated and consisted of 4 to 5 Hz of polymorphic delta and theta slowing. This asymmetry between the 2 hemispheres was continuous. Frequent left central parietal sharp waves were seen, at times occurring in periodic pattern, consistent with lateralized periodic discharges. The EKG channel revealed no abnormalities. Ictal EEG Recording / Patient Events: At 20 1:37 PM, there is a burst of rhythmic 3 to 4 Hz of delta activity maximal over the left hemisphere, which then gradually increased in amplitude, became more rhythmic and involve the right hemisphere, ending at 9:38 PM.    Summary: During the recording, the patient had 1 subclinical seizure lasting about 40 seconds originating from the left hemisphere. The interictal EEG was abnormal due to diffuse polymorphic theta slowing suggesting mild to moderate encephalopathy. Continuous left hemispheric slowing suggested underlying structural defect. Frequent left central parietal sharp waves and lateralized periodic discharges conferred an increased risk for focal onset seizures. Monitoring was continued in order to record the patient's typical events. The EKG channel revealed no abnormalities. Day 2 - 10/5/22    Interictal EEG Samples: Interictal EEG was unchanged from yesterday.     Ictal EEG Recording / Patient Events: During this period the patient had total of 5 seizures at 2:25 AM, 3 AM, 3:27 AM, 4:02 AM, 4:51 AM, 7:38 AM, 8:16 AM, 8:46 AM, 9:05 AM, 9:57 AM, 6:20 PM.  During some of the seizures, the patient noted to have left hand flapping movement and at times having left head version    Summary: During the recording, the patient had 11 electroclinical seizures with left hand flapping and left head deviation. Electrographically, these seizures were similar to yesterday seizures. The interictal EEG was abnormal due to diffuse polymorphic theta slowing suggesting mild to moderate encephalopathy. Continuous left hemispheric slowing suggested underlying structural defect. Frequent left central parietal sharp waves and lateralized periodic discharges (LPDs) conferred an increased risk for focal onset seizures. Monitoring was continued in order to record the patient's typical events. The EKG channel revealed no abnormalities. Day 3 - 10/6/22, reviewed through 7:30 am    Interictal EEG Samples: Interictal EEG was unchanged from yesterday. Ictal EEG Recording / Patient Events: During this period the patient had subclinical seizures that 4:59 AM, 5:37 AM, 6:10 AM    Summary: During the recording, the patient had 3 left hemispheric onset seizures. Electrographically, these seizures were similar to yesterday seizures. The interictal EEG was abnormal due to diffuse polymorphic theta slowing suggesting mild to moderate encephalopathy. Continuous left hemispheric slowing suggested underlying structural defect. Frequent left central parietal sharp waves and lateralized periodic discharges (LPDs) conferred an increased risk for focal onset seizures. Monitoring was continued in order to record the patient's typical events. The EKG channel revealed no abnormalities. Gudelia Mace MD  Diplomate, American Board of Psychiatry and Neurology  Diplomate, American Board of Clinical Neurophysiology  Diplomate, American Board of Epilepsy       Please note this is a preliminary report and updated daily.  The final report will have a summary of behavior and electrographic findings with clinical correlation.

## 2022-10-06 NOTE — PROGRESS NOTES
Patient evaluated by myself and Dr. Juan Singletary for transfer of primary care to Neuro ICU due to seizure activity seen on LTME. Patient has been extubated since 10/4. General neurology has been following the patient and has been managing seizures with IV Vimpat 100mg BID, oral Lamictal 125mg BID, and oral Keppra 1000mg BID. Patient is pleasantly confused, but without focal neurologic finding at this time. Current plan by Neuro is to repeat MRI w/wo contrast given poor prior study. At this time, patient does not meet Neuro ICU status. Would recommend utilizing IV AED should seizure activity persist while inpatient. Neuro Critical Care service will sign off at this time. Thank you for the consult. Please call/page us if you have any questions/concerns. We appreciate being involved in the care of your patient.

## 2022-10-06 NOTE — PROGRESS NOTES
Critical Care Team - Daily Progress Note      Date and time: 10/6/2022 8:07 AM  Patient's name:  Gwen Fink  Medical Record Number: [id number]  Patient's account/billing number: [de-identified]  Patient's YOB: 1965  Age: 59 y.o. Date of Admission: 10/3/2022  1:07 PM  Length of stay during current admission: 3      Primary Care Physician: Terese Martinez DO  ICU Attending Physician: Dr. Roro Pena Status: Full Code    Reason for ICU admission:   Chief Complaint   Patient presents with    Altered Mental Status         SUBJECTIVE:   HPI:  Patient is a 57-year-old female with past medical history of metastatic ovarian cancer with multiple recurrences presented to the emergency department with altered mental status. Was found by daughter very drowsy on 10/2, daughters that she was tired and let her sleep. On return patient was increasingly lethargic and altered, daughter called 911. Patient came to the emergency department with EMS with a GCS of 8. She was found to be febrile and tachycardic, she was intubated for airway protection sepsis work-up was initiated in the emergency department. Patient was given 2 L fluid bolus in the emergency department, was hypertensive and tachycardic, agitated with the endotracheal tube. CT head was negative for any acute processes including cranial metastases. Initial VBG with pH of 7.47, PCO2 of 34.1, bicarb of 25.2 and a lactic acid of 1.56. Patient was started on broad-spectrum antibiotics and see department. Patient did have history of intra-abdominal bleeding secondary to splenic mass with GI infiltration, status postembolization of the spleen. 10/6/2022: Neurology to be started Keppra 1 g home dose and increased it to 1 g twice daily. GI recommended no dilatation at this moment. Interval history:  - During bedside evaluation, the patient was alert and in no acute distress. - The patient was not oriented to time place and person.   Her responses were delayed and looked confused. - Neurology following; LT EEG showed 6 subclinical seizures 4 hours with left hand flapping and left head deviation in 4 of the seizures. - Neurology will repeat MRI and possibly add a third AED agent.  - GI recommended no dilatation at this time. Patient passing stools and flatus. - Patient denied any headache, fever, chills, chest pain, shortness of breath. - She did report having abdominal pain and tenderness. Patient on Pepcid.   AWAKE & FOLLOWING COMMANDS:  [] No   [x] Yes    CURRENT VENTILATION STATUS:     [] Ventilator  [] BIPAP  [x] Nasal Cannula [] Room Air      IF INTUBATED, ET TUBE MARKING AT LOWER LIP:       cms    SECRETIONS Amount:  [] Small [] Moderate  [] Large  [x] None  Color:     [] White [] Colored  [] Bloody    SEDATION:  RAAS Score:  [] Propofol gtt  [] Versed gtt  [] Ativan gtt   [x] No Sedation    PARALYZED:  [x] No    [] Yes    DIARRHEA:                [x] No                [] Yes  (C. Difficile status: [] positive                                                                                                                       [] negative                                                                                                                     [] pending)    VASOPRESSORS:  [x] No    [] Yes    If yes -   [] Levophed       [] Dopamine     [] Vasopressin       [] Dobutamine  [] Phenylephrine         [] Epinephrine    CENTRAL LINES:     [x] No   [] Yes   (Date of Insertion:   )           If yes -     [] Right IJ     [] Left IJ [] Right Femoral [] Left Femoral                   [] Right Subclavian [] Left Subclavian       BROWNLEE'S CATHETER:   [] No   [x] Yes  (Date of Insertion: 10/3/2022   )     URINE OUTPUT:            [x] Good   [] Low              [] Anuric      OBJECTIVE:     VITAL SIGNS:  BP (!) 147/95   Pulse 92   Temp 98.6 °F (37 °C) (Oral)   Resp 19   Ht 5' 5\" (1.651 m)   Wt 153 lb 7 oz (69.6 kg)   SpO2 96%   BMI 25.56 kg/m² Tmax over 24 hours:  Temp (24hrs), Av.1 °F (36.7 °C), Min:97.7 °F (36.5 °C), Max:98.6 °F (37 °C)      Patient Vitals for the past 8 hrs:   BP Temp Temp src Pulse Resp SpO2 Weight   10/06/22 0741 (!) 147/95 98.6 °F (37 °C) Oral 92 19 96 % --   10/06/22 0700 (!) 143/70 -- -- 85 23 98 % --   10/06/22 0600 (!) 144/75 97.9 °F (36.6 °C) Oral 80 21 97 % 153 lb 7 oz (69.6 kg)   10/06/22 0500 (!) 165/85 -- -- 86 21 98 % --   10/06/22 0400 (!) 149/76 97.9 °F (36.6 °C) Oral 84 21 98 % --   10/06/22 0300 (!) 155/74 -- -- 82 21 97 % --   10/06/22 0200 (!) 154/75 98.4 °F (36.9 °C) Oral 85 21 97 % --   10/06/22 0100 135/71 -- -- 89 25 98 % --         Intake/Output Summary (Last 24 hours) at 10/6/2022 0807  Last data filed at 10/6/2022 0700  Gross per 24 hour   Intake 1250.85 ml   Output 3540 ml   Net -2289.15 ml     Date 10/06/22 0000 - 10/06/22 2359   Shift 9687-4447 3353-3757 6356-3076 24 Hour Total   INTAKE   Shift Total(mL/kg)       OUTPUT   Urine(mL/kg/hr) 555(1)   555   Shift Total(mL/kg) 555(8)   555(8)   Weight (kg) 69.6 69.6 69.6 69.6     Wt Readings from Last 3 Encounters:   10/06/22 153 lb 7 oz (69.6 kg)   10/05/22 153 lb (69.4 kg)   10/04/22 153 lb (69.4 kg)     Body mass index is 25.56 kg/m². PHYSICAL EXAM:  Constitutional: Appears well, no distress  EENT: PERRLA, EOMI, sclera clear, anicteric. Neck: Supple, symmetrical, trachea midline. Respiratory: clear to auscultation, no wheezes or rales and unlabored breathing. No intercostal tenderness  Cardiovascular: regular rate and rhythm, normal S1, S2, no murmur noted and 2+ pulses throughout  Abdomen: soft, nontender, nondistended, no masses or organomegaly  NEUROLOGIC: Not oriented to name, place, and time.  Extremities:  peripheral pulses normal, no pedal edema, no clubbing or cyanosis  SKIN: normal coloration and turgor          MEDICATIONS:  Scheduled Meds:   levETIRAcetam  1,000 mg Oral BID    famotidine  20 mg Oral BID    vancomycin  1,000 mg IntraVENous Q8H    docusate  100 mg Oral Daily    apixaban  5 mg Oral BID    furosemide  20 mg Oral Daily    lamoTRIgine  125 mg Oral BID    sertraline  100 mg Oral Daily    melatonin  5 mg Oral Nightly    sodium chloride flush  5-40 mL IntraVENous 2 times per day    piperacillin-tazobactam  3,375 mg IntraVENous Q8H    vancomycin (VANCOCIN) intermittent dosing (placeholder)   Other RX Placeholder     Continuous Infusions:   sodium chloride 10 mL/hr at 10/06/22 0318    sodium chloride 100 mL/hr at 10/06/22 0316     PRN Meds:   oxyCODONE-acetaminophen, 1 tablet, Q4H PRN  morphine, 2 mg, Q4H PRN  albuterol, 2.5 mg, Q6H PRN  midazolam, 1 mg, Q2H PRN  racepinephrine HCl, 11.25 mg, Q3H PRN  sodium chloride nebulizer, 3 mL, Q4H PRN  sodium chloride flush, 5-40 mL, PRN  sodium chloride, , PRN  ondansetron, 4 mg, Q8H PRN   Or  ondansetron, 4 mg, Q6H PRN  polyethylene glycol, 17 g, Daily PRN  acetaminophen, 650 mg, Q6H PRN   Or  acetaminophen, 650 mg, Q6H PRN  potassium chloride, 20 mEq, PRN   Or  potassium chloride, 10 mEq, PRN        SUPPORT DEVICES: [] Ventilator [] BIPAP  [x] Nasal Cannula [] Room Air      Lab Results   Component Value Date/Time    REQ1UEI NOT REPORTED 06/19/2021 03:25 AM    FIO2 40.0 10/04/2022 04:16 AM     Lactic Acid:   Lab Results   Component Value Date/Time    LACTA NOT REPORTED 06/20/2021 09:39 AM    LACTA NOT REPORTED 06/20/2021 03:53 AM    LACTA NOT REPORTED 06/19/2021 11:52 PM         DATA:  Complete Blood Count:   Recent Labs     10/03/22  1339 10/04/22  0546 10/05/22  0500 10/06/22  0403   WBC 10.2 6.9 8.4 4.6   HGB 14.2 11.5* 12.7 12.5   MCV 91.2 92.9 91.8 92.6    174 154 See Reflexed IPF Result   RBC 4.88 3.97 4.27 4.07   HCT 44.5 36.9 39.2 37.7   MCH 29.1 29.0 29.7 30.7   MCHC 31.9 31.2 32.4 33.2   RDW 17.1* 17.2* 16.4* 16.3*   MPV 9.4 9.9 9.7  --         PT/INR:    Lab Results   Component Value Date/Time    PROTIME 12.2 05/03/2022 05:28 PM    INR 1.2 05/03/2022 05:28 PM     PTT: Lab Results   Component Value Date/Time    APTT 19.5 05/03/2022 05:28 PM       Basal Metabolic Profile:   Recent Labs     10/04/22  0546 10/05/22  0500 10/05/22  1658 10/06/22  0403   * 141 139 140   K 3.6* 2.6* 3.1* 3.5*   BUN 6 3*  --  3*   CREATININE 0.28* 0.35*  --  0.36*    101 102 104   CO2 21 26 26 27      Magnesium:   Lab Results   Component Value Date/Time    MG 1.6 10/06/2022 04:03 AM    MG 1.7 10/05/2022 05:00 AM    MG 1.5 10/03/2022 01:41 PM     Phosphorus:   Lab Results   Component Value Date/Time    PHOS 3.6 06/22/2021 04:14 AM    PHOS 2.7 06/21/2021 04:49 AM    PHOS 2.8 06/20/2021 03:53 AM     S. Calcium:  Recent Labs     10/06/22  0403   CALCIUM 8.5*     S. Ionized Calcium:No results for input(s): IONCA in the last 72 hours. Urinalysis:   Lab Results   Component Value Date/Time    NITRU NEGATIVE 10/03/2022 02:38 PM    COLORU Yellow 10/03/2022 02:38 PM    PHUR 6.5 10/03/2022 02:38 PM    WBCUA None 10/03/2022 02:38 PM    RBCUA 2 TO 5 10/03/2022 02:38 PM    MUCUS NOT REPORTED 02/20/2022 10:40 AM    TRICHOMONAS NOT REPORTED 02/20/2022 10:40 AM    YEAST NOT REPORTED 02/20/2022 10:40 AM    BACTERIA NOT REPORTED 02/20/2022 10:40 AM    SPECGRAV 1.018 10/03/2022 02:38 PM    LEUKOCYTESUR NEGATIVE 10/03/2022 02:38 PM    UROBILINOGEN Normal 10/03/2022 02:38 PM    BILIRUBINUR NEGATIVE 10/03/2022 02:38 PM    GLUCOSEU 1+ 10/03/2022 02:38 PM    KETUA SMALL 10/03/2022 02:38 PM    AMORPHOUS NOT REPORTED 02/20/2022 10:40 AM       CARDIAC ENZYMES: No results for input(s): CKMB, CKMBINDEX, TROPONINI in the last 72 hours. Invalid input(s): CKTOTAL;3  BNP: No results for input(s): BNP in the last 72 hours.     LFTS  Recent Labs     10/03/22  1341   ALKPHOS 98   ALT 9   AST 18   BILITOT 0.3   LABALBU 4.3       AMYLASE/LIPASE/AMMONIA  Recent Labs     10/03/22  1339   AMMONIA 36       Last 3 Blood Glucose:   Recent Labs     10/03/22  1341 10/04/22  0546 10/05/22  0500 10/06/22  0403   GLUCOSE 158* 123* 106* 113*      HgBA1c:    Lab Results   Component Value Date/Time    LABA1C 5.2 04/13/2022 01:10 PM         TSH:    Lab Results   Component Value Date/Time    TSH 2.93 03/09/2021 05:43 PM     ANEMIA STUDIES  No results for input(s): LABIRON, TIBC, FERRITIN, IZFEGBED79, FOLATE, OCCULTBLD in the last 72 hours. Cultures during this admission:     Blood cultures:                 [] None drawn      [x] Negative             []  Positive (Details:  )  Urine Culture:                   [] None drawn      [x] Negative             []  Positive (Details:  )  Sputum Culture:               [x] None drawn       [] Negative             []  Positive (Details:  )   Endotracheal aspirate:     [x] None drawn       [] Negative             []  Positive (Details:  )             Chest Xray (10/6/2022): Impression   Interval extubation. Interstitial opacities have increased in the interval,   for which developing edema should be considered. CT chest abdomen pelvis with contrast:  Impression   1. Progressive bibasilar and left lower lobe consolidative change which may   represent atelectasis versus superimposed pneumonia. 2. Redemonstration of thoracic and abdominal metastatic disease with mixed   change. Most lesions appear stable with a few interval progression (right   axillary metastatic lymphadenopathy. Retroperitoneal left periaortic   metastatic lymphadenopathy, pelvic presacral cystic mass). 3. Redemonstration of peritoneal carcinomatosis. No significant ascites. 4. Stable blastic skeletal metastasis. 5. Moderate transverse and descending colonic distention with liquid stool. Abrupt transition in the mid sigmoid colon which may relate to malignant   partial obstruction or stricture. Mild interval improvement of colonic   edema.   No evidence of colonic perforation or pneumatosis       ASSESSMENT:     Principal Problem:    Septicemia ([facility] [address]  Active Problems:    Seizure disorder, nonconvulsive, with status epilepticus ([facility] [address]    AMS (altered mental status)  Resolved Problems:    * No resolved hospital problems. * FLUIDS: 0.9% NaCl 100 mL /hr  FEEDING: Adult regular diet  ANALGESICS: Tylenol as needed, Percocet, and morphine  SEDATION: No Sedation/Analgesia  THROMBO- PROPHYLAXIS: Eliquis  MOBILIZATION: PT OT not following  HEADS UP: 30  HEMODYNAMICS: 147/76 mmHg  ULCER PROPHYLAXIS: Pepcid   GLYCEMIC CONTROL: None  SPONTANEOUS BREATHING TRIAL: Breathing on 3 L nasal cannula oxygen  BOWEL MANAGEMENT: Colace 100 mg oral daily  INDWELLING CATHETER: Mercado 16 Fr  DRUG DE-ESCALATION: Discontinue Mercado    PLAN:     PLAN/MEDICAL DECISION MAKING:  Neurologic:   LTM is showing patient having multiple seizures since last night as per neurology note. Neuro checks per protocol  no sedation, Ativan in the morning due to seizure activity. Patient loaded with 2 g Keppra 1 mg Ativan in the morning. Keppra maintenance dose changed from 1 g at bedtime to 1 g twice daily. Precedex stopped on 10/4/2022. Neuro planning to repeat MRI and possibly add another AED agent. Cardiovascular:  Hemodynamically stable  MAP goal >65  Patient on no pressors  Pulmonary:  Maintain oxygen sats >92%  Pulmonary toilet  Patient extubated on 10/4/2022. Currently on 4 L nasal cannula oxygen. Breathing comfortably. GI/Nutrition  Colace 100 mg oral daily  Ulcer Prophylaxis: H2 blockers Pepcid 20 mg oral 2 times daily  Diet:ADULT DIET; Regular  CT scan abdomen shows dilated colon with stricturing sigmoid unchanged from June 2022. General surgery following, recommended serial abdominal examination. General surgery recommended GI for recommendations and possible dilatation. No surgical intervention planned. According to GI, sigmoid stricture likely from peritoneal carcinomatosis. Patient having multiple liquidy bowel movements. GI plan to continue with current bowel regimen.   According to GI, colonic dilatation does not last long and would not be recommended. Colonic stent only went complete bowel obstruction. Renal/Fluid/Electrolyte  IV Fluids: 0.9NS @ 100 mL/Hr   I/O: In: 1250.9   Out: 3540 [Urine:3540]  UOP: 2.1 cc/kg/hr  Monitor electrolytes, replace PRN   ID  WBC:   Lab Results   Component Value Date    WBC 4.6 10/06/2022     Tmax: Temp (24hrs), Av.1 °F (36.7 °C), Min:97.7 °F (36.5 °C), Max:98.6 °F (37 °C)    Antimicrobials: vancomycin and Zosyn   Hematology:  Recent Labs     10/04/22  0546 10/05/22  0500 10/06/22  0403   HGB 11.5* 12.7 12.5    stable  Endocrine:   glucose controlled - most recent BGL is   Recent Labs     10/04/22  0546 10/05/22  0500 10/06/22  0403   GLUCOSE 123* 106* 113*     DVT Prophylaxis  Eliquis    - PT OT consulted  - Mercado discontinued, external catheter will be instilled  - Continue with Pepcid  - Plan to transfer to neuro ICU for continued management    Discharge Needs:  PT, OT, ST, SW, and Case Management      CODE STATUS: Full Code             Carroll Nuñez MD, M.D.              Department of Internal Medicine/ Critical care  [facility], [facility] ([place]             10/6/2022, 8:07 AM

## 2022-10-06 NOTE — PLAN OF CARE
Problem: Discharge Planning  Goal: Discharge to home or other facility with appropriate resources  10/6/2022 0017 by Eric Mcghee RN  Outcome: Not Progressing  Flowsheets (Taken 10/5/2022 2000)  Discharge to home or other facility with appropriate resources: Identify barriers to discharge with patient and caregiver  10/5/2022 1824 by Logan Barriga RN  Outcome: Progressing  10/5/2022 1822 by Logan Barriga RN  Outcome: Progressing     Problem: Safety - Medical Restraint  Goal: Remains free of injury from restraints (Restraint for Interference with Medical Device)  Description: INTERVENTIONS:  1. Determine that other, less restrictive measures have been tried or would not be effective before applying the restraint  2. Evaluate the patient's condition at the time of restraint application  3. Inform patient/family regarding the reason for restraint  4.  Q2H: Monitor safety, psychosocial status, comfort, nutrition and hydration  10/6/2022 0017 by Eric Mcghee RN  Outcome: Not Progressing  Flowsheets (Taken 10/5/2022 2000)  Remains free of injury from restraints (restraint for interference with medical device): Every 2 hours: Monitor safety, psychosocial status, comfort, nutrition and hydration  10/5/2022 1824 by Logan Barriga RN  Outcome: Progressing  10/5/2022 1822 by Logan Barriga RN  Outcome: Progressing  Flowsheets  Taken 10/5/2022 1800 by Logan Barriga RN  Remains free of injury from restraints (restraint for interference with medical device):   Every 2 hours: Monitor safety, psychosocial status, comfort, nutrition and hydration   Inform patient/family regarding the reason for restraint   Evaluate the patient's condition at the time of restraint application   Determine that other, less restrictive measures have been tried or would not be effective before applying the restraint  Taken 10/5/2022 1600 by Logan Barriga RN  Remains free of injury from restraints (restraint for interference with medical device):   Determine that other, less restrictive measures have been tried or would not be effective before applying the restraint   Evaluate the patient's condition at the time of restraint application   Inform patient/family regarding the reason for restraint   Every 2 hours: Monitor safety, psychosocial status, comfort, nutrition and hydration  Taken 10/5/2022 1400 by Andrew Enriquez RN  Remains free of injury from restraints (restraint for interference with medical device):   Evaluate the patient's condition at the time of restraint application   Determine that other, less restrictive measures have been tried or would not be effective before applying the restraint   Inform patient/family regarding the reason for restraint   Every 2 hours: Monitor safety, psychosocial status, comfort, nutrition and hydration  Taken 10/5/2022 1200 by Andrew Enriquez RN  Remains free of injury from restraints (restraint for interference with medical device):   Determine that other, less restrictive measures have been tried or would not be effective before applying the restraint   Evaluate the patient's condition at the time of restraint application   Inform patient/family regarding the reason for restraint   Every 2 hours: Monitor safety, psychosocial status, comfort, nutrition and hydration  Taken 10/5/2022 1000 by Andrew Enriquez RN  Remains free of injury from restraints (restraint for interference with medical device):   Determine that other, less restrictive measures have been tried or would not be effective before applying the restraint   Evaluate the patient's condition at the time of restraint application   Inform patient/family regarding the reason for restraint   Every 2 hours: Monitor safety, psychosocial status, comfort, nutrition and hydration  Taken 10/5/2022 0800 by Andrew Enriquez RN  Remains free of injury from restraints (restraint for interference with medical device):    Inform patient/family regarding the reason for restraint   Determine that other, less restrictive measures have been tried or would not be effective before applying the restraint   Evaluate the patient's condition at the time of restraint application   Every 2 hours: Monitor safety, psychosocial status, comfort, nutrition and hydration  Taken 10/5/2022 0600 by Herminia Reilly RN  Remains free of injury from restraints (restraint for interference with medical device): Determine that other, less restrictive measures have been tried or would not be effective before applying the restraint

## 2022-10-06 NOTE — PROGRESS NOTES
Critical care team - Resident sign-out to Medicine service      Date and time: 10/6/2022 3:31 PM  Patient's name:  Yonatan Duvall Record Number: [id number]  Patient's account/billing number: [de-identified]  Patient's YOB: 1965  Age: 59 y.o. Date of Admission: 10/3/2022  1:07 PM  Length of stay during current admission: 3    Primary Care Physician: Isidro Dyson DO    Code Status: Full Code    Mode of physician to physician communication:        [] Via telephone   [] In person     Date and time of sign-out: 10/6/2022 3:31 PM    Accepting Internal Medicine resident: Dr. Pedro Lai    Accepting Medicine team: IM Team 2    Accepting team's attending: Dr. Kyle Arroyo    Patient's current ICU Bed:  0242    Patient's assigned bed on floor:  140        [] Med-Surg Monitored [x] Step-down       [] Psychiatry ICU       [] Psych floor     Reason for ICU admission:     Altered mental status    ICU course summary:     57-year-old female presented with chief complaints of altered mental status and ED. Was found drowsy by daughter on 10/2/2022. Had GCS of 8 in ED. Was also febrile and tachycardic. ED, the patient was intubated and sepsis work-up was started. Was given 2 L fluid bolus in ED. She was hypertensive and tachycardic. Patient has history of recurrent ovarian cancer(follows Dr. Alcide Lesches) with peritoneal and lung metastasis, lymphedema, currently having chemotherapy. Had recent active intra-abdominal bleeding due to splinting was for GI infiltration. History of prior PRES with subsequent seizure disorder in 2020. CT head was negative for acute intracranial abnormality, and CTA head and neck was negative for stenosis or occlusion. CT abdominal pelvis was done which showed dilated colon with stricturing sigmoid which was similar to imaging of June 2022. General surgery was consulted who recommended serial abdominal examinations with GI consult for possible dilatation.   No surgical intervention was recommended. 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 GI recommended continuing with current bowel regimen and no dilatation procedure or stent placement. During the ICU stay, the patient was on LTM EEG. On  10/5/2022, the LTM EEG showed 6 subclinical seizures over 24 hours with left hand flapping and left head deviation during for electroclinical seizures. The patient was started with home dose of Keppra with loading dose 2 g and 1 mg Ativan for seizures. Maintenance dose of Keppra was increased from 1 g at bedtime which was the home dose to 1 g twice daily. As per neurology note, there may be further adjustments to AED and possible third agent addition. Neurology planning repeat MRI with concern being possible PRES versus intracranial mets as per imaging results of her first MRI. On 10/6/2022, the patient was hemodynamically stable, under no sedation, breathing comfortably with 4 L nasal cannula oxygen. The patient was not oriented to time place and person but is not agitated and follows commands. On 10/7/2022, the patient's neurological condition improved and she was oriented to time place and person. Oncology was contacted and Dr. Homa He will follow the patient. Neurology recommended holding repeat MRI for patient's condition to get better. Patient stable hemodynamically and breathing normally on room air. Mercado's catheter taken out and patient urinating normally. Plan to transfer to neuro stepdown.     Procedures during patient's ICU stay:     None    Current Vitals:     BP (!) 148/81   Pulse 88   Temp 98.1 °F (36.7 °C) (Oral)   Resp 20   Ht 5' 5\" (1.651 m)   Wt 153 lb 7 oz (69.6 kg)   SpO2 98%   BMI 25.56 kg/m²       Cultures:     Blood cultures:                 [] None drawn      [] Negative             []  Positive (Details: )  Urine Culture:                   [] None drawn      [] Negative             []  Positive (Details:  )  Sputum Culture:               [] None drawn       [] Negative             []  Positive (Details:  )   Endotracheal aspirate:     [] None drawn       [] Negative             []  Positive (Details:  )       Consults:     1. Neurology  2. Gastroenterology  3.   General surgery    Assessment:     Patient Active Problem List    Diagnosis Date Noted    Septicemia ([facility] [address] 10/03/2022    Iron deficiency     Leg swelling 25/70/6426    Acute metabolic encephalopathy     Confusion     Urinary tract infection without hematuria     Seizure disorder (HCC)     Breakthrough seizure ([address]     Lactic acidosis 02/21/2022    Diabetes mellitus ([address] 02/21/2022    Ileus ([address] 02/21/2022    Pericardial effusion 02/21/2022    Acute respiratory failure with hypoxia ([address] 02/21/2022    AMS (altered mental status) 02/20/2022    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 ([facility] [address] 02/02/2022    Anxiety 01/08/2022    Gastroesophageal reflux disease 01/08/2022    Recurrent major depressive disorder, in partial remission ([address] 01/08/2022    Acute on chronic anemia 10/28/2021    Pelvic mass     History of deep venous thrombosis (DVT) of distal vein of left lower extremity: On Eliquis 06/20/2021    Thrombocytosis     Gynecologic cancer ([address] 05/29/2021    Iron deficiency anemia secondary to inadequate dietary iron intake 05/28/2021    Iron malabsorption 05/28/2021    Chronic deep vein thrombosis (DVT) of femoral vein of left lower extremity ([address] 03/11/2021    Fatigue     Intractable low back pain     Cancer, metastatic to bone ([address] 01/28/2021    Bandemia     Pleural effusion 01/13/2021    History of ovarian cancer     Seizure disorder, nonconvulsive, with status epilepticus ([address] 01/04/2021    Encephalopathy acute     PRES (posterior reversible encephalopathy syndrome)     Hemianopia, bitemporal     Type 2 diabetes mellitus without complication, without long-term current use of insulin (HCC)     Anemia     Splenic abscess     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 [facility])     Acute encephalopathy     Seizure ([facility] [address] 10/21/2020    ACP (advance care planning) 08/21/2020    Malignant neoplasm of ovary ([facility] [address] 08/17/2020         Recommended Follow-up:     MRI scan    Above mentioned assessment and plan was discussed by me with the admitting medicine resident. The medicine team assigned to the patient by medicine admitting resident will be following up the patient from now onwards on the floor.      Radha Stone MD  Internal Medicine Resident  [id number]  10/6/2022 3:31 PM

## 2022-10-06 NOTE — CARE COORDINATION
Transitional planning:  Met with unit RN, pt is on LTME, day 2. Need PT/OT evaluations. 56 Called PT/OT and left message pt needs evaluations.

## 2022-10-07 LAB
ABSOLUTE EOS #: 0.03 K/UL (ref 0–0.44)
ABSOLUTE IMMATURE GRANULOCYTE: 0.04 K/UL (ref 0–0.3)
ABSOLUTE LYMPH #: 0.9 K/UL (ref 1.1–3.7)
ABSOLUTE MONO #: 0.85 K/UL (ref 0.1–1.2)
ANION GAP SERPL CALCULATED.3IONS-SCNC: 13 MMOL/L (ref 9–17)
BASOPHILS # BLD: 0 % (ref 0–2)
BASOPHILS ABSOLUTE: 0.03 K/UL (ref 0–0.2)
BUN BLDV-MCNC: 3 MG/DL (ref 6–20)
CALCIUM SERPL-MCNC: 8.7 MG/DL (ref 8.6–10.4)
CHLORIDE BLD-SCNC: 108 MMOL/L (ref 98–107)
CO2: 24 MMOL/L (ref 20–31)
CREAT SERPL-MCNC: 0.64 MG/DL (ref 0.5–0.9)
EOSINOPHILS RELATIVE PERCENT: 0 % (ref 1–4)
GFR SERPL CREATININE-BSD FRML MDRD: >60 ML/MIN/1.73M2
GLUCOSE BLD-MCNC: 126 MG/DL (ref 70–99)
HCT VFR BLD CALC: 39 % (ref 36.3–47.1)
HEMOGLOBIN: 12.3 G/DL (ref 11.9–15.1)
IMMATURE GRANULOCYTES: 1 %
LYMPHOCYTES # BLD: 10 % (ref 24–43)
MAGNESIUM: 1.5 MG/DL (ref 1.6–2.6)
MCH RBC QN AUTO: 29.3 PG (ref 25.2–33.5)
MCHC RBC AUTO-ENTMCNC: 31.5 G/DL (ref 28.4–34.8)
MCV RBC AUTO: 92.9 FL (ref 82.6–102.9)
MONOCYTES # BLD: 10 % (ref 3–12)
NRBC AUTOMATED: 0 PER 100 WBC
PDW BLD-RTO: 16.2 % (ref 11.8–14.4)
PLATELET # BLD: ABNORMAL K/UL (ref 138–453)
PLATELET, FLUORESCENCE: 114 K/UL (ref 138–453)
PLATELET, IMMATURE FRACTION: 2.4 % (ref 1.1–10.3)
POTASSIUM SERPL-SCNC: 3 MMOL/L (ref 3.7–5.3)
POTASSIUM SERPL-SCNC: 3.9 MMOL/L (ref 3.7–5.3)
RBC # BLD: 4.2 M/UL (ref 3.95–5.11)
RBC # BLD: ABNORMAL 10*6/UL
SEG NEUTROPHILS: 79 % (ref 36–65)
SEGMENTED NEUTROPHILS ABSOLUTE COUNT: 6.98 K/UL (ref 1.5–8.1)
SODIUM BLD-SCNC: 145 MMOL/L (ref 135–144)
WBC # BLD: 8.8 K/UL (ref 3.5–11.3)

## 2022-10-07 PROCEDURE — 80048 BASIC METABOLIC PNL TOTAL CA: CPT

## 2022-10-07 PROCEDURE — 6370000000 HC RX 637 (ALT 250 FOR IP)

## 2022-10-07 PROCEDURE — 84100 ASSAY OF PHOSPHORUS: CPT

## 2022-10-07 PROCEDURE — 85055 RETICULATED PLATELET ASSAY: CPT

## 2022-10-07 PROCEDURE — 2060000000 HC ICU INTERMEDIATE R&B

## 2022-10-07 PROCEDURE — 2580000003 HC RX 258: Performed by: STUDENT IN AN ORGANIZED HEALTH CARE EDUCATION/TRAINING PROGRAM

## 2022-10-07 PROCEDURE — 6360000002 HC RX W HCPCS: Performed by: STUDENT IN AN ORGANIZED HEALTH CARE EDUCATION/TRAINING PROGRAM

## 2022-10-07 PROCEDURE — APPSS30 APP SPLIT SHARED TIME 16-30 MINUTES: Performed by: NURSE PRACTITIONER

## 2022-10-07 PROCEDURE — 99255 IP/OBS CONSLTJ NEW/EST HI 80: CPT | Performed by: INTERNAL MEDICINE

## 2022-10-07 PROCEDURE — 6370000000 HC RX 637 (ALT 250 FOR IP): Performed by: STUDENT IN AN ORGANIZED HEALTH CARE EDUCATION/TRAINING PROGRAM

## 2022-10-07 PROCEDURE — 99233 SBSQ HOSP IP/OBS HIGH 50: CPT | Performed by: STUDENT IN AN ORGANIZED HEALTH CARE EDUCATION/TRAINING PROGRAM

## 2022-10-07 PROCEDURE — 84132 ASSAY OF SERUM POTASSIUM: CPT

## 2022-10-07 PROCEDURE — 36415 COLL VENOUS BLD VENIPUNCTURE: CPT

## 2022-10-07 PROCEDURE — 6370000000 HC RX 637 (ALT 250 FOR IP): Performed by: NURSE PRACTITIONER

## 2022-10-07 PROCEDURE — 95714 VEEG EA 12-26 HR UNMNTR: CPT

## 2022-10-07 PROCEDURE — 99232 SBSQ HOSP IP/OBS MODERATE 35: CPT | Performed by: PSYCHIATRY & NEUROLOGY

## 2022-10-07 PROCEDURE — 95720 EEG PHY/QHP EA INCR W/VEEG: CPT | Performed by: PSYCHIATRY & NEUROLOGY

## 2022-10-07 PROCEDURE — 83735 ASSAY OF MAGNESIUM: CPT

## 2022-10-07 PROCEDURE — 85025 COMPLETE CBC W/AUTO DIFF WBC: CPT

## 2022-10-07 PROCEDURE — 99291 CRITICAL CARE FIRST HOUR: CPT | Performed by: INTERNAL MEDICINE

## 2022-10-07 RX ORDER — LORAZEPAM 2 MG/ML
1 INJECTION INTRAMUSCULAR ONCE
Status: DISCONTINUED | OUTPATIENT
Start: 2022-10-07 | End: 2022-10-07

## 2022-10-07 RX ORDER — LEVOFLOXACIN 500 MG/1
500 TABLET, FILM COATED ORAL DAILY
Status: COMPLETED | OUTPATIENT
Start: 2022-10-08 | End: 2022-10-10

## 2022-10-07 RX ORDER — MAGNESIUM SULFATE IN WATER 40 MG/ML
2000 INJECTION, SOLUTION INTRAVENOUS ONCE
Status: COMPLETED | OUTPATIENT
Start: 2022-10-07 | End: 2022-10-07

## 2022-10-07 RX ADMIN — SODIUM CHLORIDE, PRESERVATIVE FREE 10 ML: 5 INJECTION INTRAVENOUS at 21:01

## 2022-10-07 RX ADMIN — POTASSIUM CHLORIDE 10 MEQ: 7.46 INJECTION, SOLUTION INTRAVENOUS at 07:25

## 2022-10-07 RX ADMIN — Medication 5 MG: at 21:53

## 2022-10-07 RX ADMIN — Medication 1000 MG: at 01:33

## 2022-10-07 RX ADMIN — Medication 1000 MG: at 11:19

## 2022-10-07 RX ADMIN — APIXABAN 5 MG: 5 TABLET, FILM COATED ORAL at 08:12

## 2022-10-07 RX ADMIN — LAMOTRIGINE 125 MG: 100 TABLET ORAL at 08:11

## 2022-10-07 RX ADMIN — POTASSIUM CHLORIDE 10 MEQ: 7.46 INJECTION, SOLUTION INTRAVENOUS at 11:59

## 2022-10-07 RX ADMIN — SODIUM CHLORIDE: 9 INJECTION, SOLUTION INTRAVENOUS at 00:40

## 2022-10-07 RX ADMIN — PIPERACILLIN AND TAZOBACTAM 3375 MG: 3; .375 INJECTION, POWDER, FOR SOLUTION INTRAVENOUS at 12:43

## 2022-10-07 RX ADMIN — MAGNESIUM SULFATE HEPTAHYDRATE 2000 MG: 40 INJECTION, SOLUTION INTRAVENOUS at 17:45

## 2022-10-07 RX ADMIN — POTASSIUM CHLORIDE 10 MEQ: 7.46 INJECTION, SOLUTION INTRAVENOUS at 10:50

## 2022-10-07 RX ADMIN — LEVETIRACETAM 1000 MG: 500 TABLET, FILM COATED ORAL at 08:11

## 2022-10-07 RX ADMIN — POTASSIUM CHLORIDE 10 MEQ: 7.46 INJECTION, SOLUTION INTRAVENOUS at 08:31

## 2022-10-07 RX ADMIN — LACOSAMIDE 100 MG: 100 TABLET, FILM COATED ORAL at 21:53

## 2022-10-07 RX ADMIN — ACETAMINOPHEN 650 MG: 325 TABLET ORAL at 11:56

## 2022-10-07 RX ADMIN — FAMOTIDINE 20 MG: 20 TABLET, FILM COATED ORAL at 21:53

## 2022-10-07 RX ADMIN — LACOSAMIDE 100 MG: 100 TABLET, FILM COATED ORAL at 08:12

## 2022-10-07 RX ADMIN — LAMOTRIGINE 125 MG: 100 TABLET ORAL at 21:53

## 2022-10-07 RX ADMIN — FAMOTIDINE 20 MG: 20 TABLET, FILM COATED ORAL at 08:12

## 2022-10-07 RX ADMIN — LEVETIRACETAM 1000 MG: 500 TABLET, FILM COATED ORAL at 21:52

## 2022-10-07 RX ADMIN — SERTRALINE 100 MG: 50 TABLET, FILM COATED ORAL at 08:12

## 2022-10-07 RX ADMIN — POTASSIUM CHLORIDE 10 MEQ: 7.46 INJECTION, SOLUTION INTRAVENOUS at 06:19

## 2022-10-07 RX ADMIN — SODIUM CHLORIDE, PRESERVATIVE FREE 10 ML: 5 INJECTION INTRAVENOUS at 08:19

## 2022-10-07 RX ADMIN — APIXABAN 5 MG: 5 TABLET, FILM COATED ORAL at 21:53

## 2022-10-07 RX ADMIN — POTASSIUM CHLORIDE 10 MEQ: 7.46 INJECTION, SOLUTION INTRAVENOUS at 09:39

## 2022-10-07 RX ADMIN — PIPERACILLIN AND TAZOBACTAM 3375 MG: 3; .375 INJECTION, POWDER, FOR SOLUTION INTRAVENOUS at 05:03

## 2022-10-07 RX ADMIN — FUROSEMIDE 20 MG: 20 TABLET ORAL at 08:11

## 2022-10-07 ASSESSMENT — PAIN DESCRIPTION - DESCRIPTORS: DESCRIPTORS: ACHING

## 2022-10-07 ASSESSMENT — PAIN SCALES - GENERAL
PAINLEVEL_OUTOF10: 3
PAINLEVEL_OUTOF10: 0

## 2022-10-07 ASSESSMENT — PAIN DESCRIPTION - LOCATION: LOCATION: HEAD

## 2022-10-07 NOTE — PLAN OF CARE
Problem: Respiratory - Adult  Goal: Achieves optimal ventilation and oxygenation  10/7/2022 0836 by Marianna Reyes RN  Outcome: Progressing  Flowsheets (Taken 10/7/2022 0800)  Achieves optimal ventilation and oxygenation: Assess for changes in respiratory status  10/6/2022 1950 by Talha Mercado RN  Outcome: Progressing     Problem: Discharge Planning  Goal: Discharge to home or other facility with appropriate resources  10/7/2022 0836 by Marianna Reyes RN  Outcome: Progressing  Flowsheets  Taken 10/7/2022 0800 by Marianna Reyes RN  Discharge to home or other facility with appropriate resources: Identify barriers to discharge with patient and caregiver  Taken 10/6/2022 2000 by Talha Mercado RN  Discharge to home or other facility with appropriate resources: Identify barriers to discharge with patient and caregiver  10/6/2022 1950 by Talha Mercado RN  Outcome: Progressing     Problem: Safety - Adult  Goal: Free from fall injury  10/7/2022 0836 by Marianna Reyes RN  Outcome: Progressing  Flowsheets (Taken 10/6/2022 1952 by Talha Mercado RN)  Free From Fall Injury: Instruct family/caregiver on patient safety  10/6/2022 1950 by Talha Mercado RN  Outcome: Progressing     Problem: ABCDS Injury Assessment  Goal: Absence of physical injury  10/7/2022 0836 by Marianna Reyes RN  Outcome: Progressing  [address] (Taken 10/6/2022 1952 by Talha Mercado RN)  Absence of Physical Injury: Implement safety measures based on patient assessment  10/6/2022 1950 by Talha Mercado RN  Outcome: Progressing     Problem: Skin/Tissue Integrity  Goal: Absence of new skin breakdown  Description: 1. Monitor for areas of redness and/or skin breakdown  2. Assess vascular access sites hourly  3. Every 4-6 hours minimum:  Change oxygen saturation probe site  4.   Every 4-6 hours:  If on nasal continuous positive airway pressure, respiratory therapy assess nares and determine need for appliance change or resting period.   10/7/2022 0836 by Jeffery Issa RN  Outcome: Progressing  10/6/2022 1950 by Shelley Dempsey RN  Outcome: Progressing     Problem: Pain  Goal: Verbalizes/displays adequate comfort level or baseline comfort level  10/7/2022 0836 by Jeffery Issa RN  Outcome: Progressing  10/6/2022 1950 by Shelley Dempsey RN  Outcome: Progressing     Problem: Chronic Conditions and Co-morbidities  Goal: Patient's chronic conditions and co-morbidity symptoms are monitored and maintained or improved  10/7/2022 0836 by Jeffery Issa RN  Outcome: Progressing  Flowsheets  Taken 10/7/2022 0800 by Jeffery Issa RN  Care Plan - Patient's Chronic Conditions and Co-Morbidity Symptoms are Monitored and Maintained or Improved: Monitor and assess patient's chronic conditions and comorbid symptoms for stability, deterioration, or improvement  Taken 10/6/2022 2000 by Kallie Woodard 34 - Patient's Chronic Conditions and Co-Morbidity Symptoms are Monitored and Maintained or Improved: Monitor and assess patient's chronic conditions and comorbid symptoms for stability, deterioration, or improvement  10/6/2022 1950 by Shelley Dempsey RN  Outcome: Progressing

## 2022-10-07 NOTE — CONSULTS
_                         Today's Date: 10/7/2022  Patient Name: Shama Villatoro  Date of admission: 10/3/2022  1:07 PM  Patient's age: 59 y.o., 1965  Admission Dx: Septicemia ([facility] [address] [A41.9]  Altered mental status, unspecified altered mental status type [R41.82]  AMS (altered mental status) [R41.82]      Requesting Physician: James Nye MD    CHIEF COMPLAINT: Altered mental status. Sepsis. Recurrent metastatic ovarian cancer. History Obtained From:  patient, electronic medical record    HISTORY OF PRESENT ILLNESS:      The patient is a 59 y.o.  female who is admitted to the hospital for further management of altered mental status. She had increasing shortness of breath. She was admitted for sepsis. She was intubated for airway protection. Patient was admitted on October 3. She was extubated yesterday. Were consulted today because of her history of recurrent ovarian cancer. Patient is mentally alert and oriented. No respiratory distress. No fever. She is not in pain. Brief oncologic history:  DIAGNOSIS:       Recurrent ovarian cancer. Original diagnosis 2005 with multiple recurrences. Diffuse metastasis. CURRENT THERAPY:         Doxil/ Avastin started 9/18/2020. Avastin was discontinued due to recent GI bleeding. Status post recent vascular embolization  S/p seizure disorder. Gemzar started 2/26/2021. Interrupted due to hospitalization and problem with compliance. Evidence of disease progression on CT scan 2/22/22  Added Carboplatin to Gemzar March 2022        BRIEF CASE HISTORY:      Ms. Kanu Lemus is a very pleasant 59 y.o. female with history of multiple co morbidities as listed. The patient seen in consultation for ovarian cancer with multiple recurrences. 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   She had surgical debulking and she had systemic treatment with Taxol and carboplatin for 6 cycles. Patient was in remission for about 2 years. She had a relapse in 2007 and treated with topotecan with good results. Patient relapsed again in 2008 and was treated with Taxol carboplatin. After 3 cycles of systemic chemotherapy she had problems with carboplatin so she finished 3 more cycles with Taxol. She did well again for 2 to 3 years until she had a relapse in 2012 and she had intraperitoneal chemotherapy treatment with Taxol. Patient was last seen by her oncologist in 2014 at which time she had relatively stable disease with normal tumor marker and no significant abnormal images. Patient moved to [place] after that and she was lost for oncology follow-ups. Patient was recently evaluated again here in [facility] because of abdominal discomfort. Re imaging confirmed metastatic relapse. Biopsy confirmed ovarian cancer recurrence. Scan showed extensive intraabdominal and splenic involvement and lung mets. She has no symptoms at the present time. Patient denies smoking or alcohol drinking.l       Past Medical History:   has a past medical history of Anemia, Bleeding, Cervical cancer ([address] Depression, Diabetes mellitus ([address] GERD (gastroesophageal reflux disease), Hx of blood clots, Hypertension, Metastatic cancer ([address] Ovarian cancer ([address] Post chemo evaluation, PRES (posterior reversible encephalopathy syndrome), and Splenic lesion. Past Surgical History:   has a past surgical history that includes Hysterectomy, total abdominal; Port Surgery; Tonsillectomy; IR PORT PLACEMENT > 5 YEARS (08/24/2020); Anus surgery; Abscess Drainage (2013); colectomy (03/2013); IR EMBOLIZATION HEMORRHAGE (10/05/2020); and Cardiac catheterization. Family History: family history includes Alcohol Abuse in her mother; Cirrhosis in her mother. Social History:   reports that she has been smoking cigarettes. She has a 20.00 pack-year smoking history.  She has never used smokeless tobacco. She reports that she does not currently use alcohol. She reports that she does not use drugs. Medications:    Prior to Admission medications    Medication Sig Start Date End Date Taking? Authorizing Provider   STIMULANT LAXATIVE 8.6-50 MG per tablet TAKE 2 TABLETS BY MOUTH NIGHTLY. 10/6/22   Shanda Medhkour, DO   morphine (MS CONTIN) 15 MG extended release tablet TAKE 1 TABLET BY MOUTH 2 TIMES DAILY FOR 30 DAYS. 9/28/22 10/28/22  Kat Velásquez MD   lamoTRIgine (LAMICTAL) 25 MG tablet Take 5 tablets by mouth 2 times daily 9/22/22   Shanda Medhkobreanna, DO   LORazepam (ATIVAN) 1 MG tablet TAKE 1 TABLET BY MOUTH EVERY 8 HOURS AS NEEDED FOR ANXIETY FOR UP TO 30 DAYS. 9/21/22 10/21/22  Kat Velásquez MD   morphine (MS CONTIN) 30 MG extended release tablet TAKE 1 TABLET BY MOUTH 2 TIMES DAILY FOR 30 DAYS. 9/21/22 10/21/22  Kat Velásquez MD   sertraline (ZOLOFT) 100 MG tablet TAKE 1 TABLET BY MOUTH DAILY 9/21/22 10/21/22  Kat Velásquez MD   oxyCODONE-acetaminophen (PERCOCET) 5-325 MG per tablet Take 1 tablet by mouth every 4 hours as needed for Pain for up to 30 days. 9/13/22 10/13/22  Samuel Bhatia MD   FEROSUL 325 (65 Fe) MG tablet TAKE 1 TABLET BY MOUTH ONCE DAILY WITH BREAKFAST 9/9/22   Samuel Bhatia MD   levETIRAcetam (KEPPRA) 1000 MG tablet TAKE 1 TABLET (1,000 MG TOTAL) BY MOUTH IN THE MORNING AND 1 TABLET (1,000 MG TOTAL) BEFORE BEDTIME. 9/9/22   Samuel Bhatia MD   FEROSUL 325 (65 Fe) MG tablet TAKE 1 TABLET (325 MG TOTAL) BY MOUTH DAILY WITH BREAKFAST. 9/8/22   Shanda Guzman DO   pantoprazole (PROTONIX) 40 MG tablet Take 1 tablet by mouth daily 9/1/22   Samuel Bhatia MD   Lactobacillus (ACIDOPHILUS) CAPS capsule TAKE 1 TABLET BY MOUTH ONCE DAILY IN THE MORNING AND 1 TABLET BEFORE BEDTIME 8/31/22   Shanda Medhkour, DO   furosemide (LASIX) 20 MG tablet TAKE 1 TABLET (20 MG TOTAL) BY MOUTH DAILY.  8/29/22   Shanda Medhkour, DO   dicyclomine (BENTYL) 20 MG tablet TAKE 1 TABLET (20 MG TOTAL) BY MOUTH IN THE MORNING AND 1 TABLET (20 MG TOTAL) BEFORE BEDTIME. 8/29/22   Shanda Medhkour, DO   potassium chloride (MICRO-K) 10 MEQ extended release capsule TAKE 1 CAPSULE (10 MEQ TOTAL) BY MOUTH IN THE MORNING. 8/29/22   Shanda Medhkour, DO   Handicap Plactong MISC 5 years 7/19/22   Karlos Hinds, DO   loperamide (IMODIUM) 2 MG capsule Take 2 mg by mouth 4 times daily as needed for Diarrhea    Historical Provider, MD   ondansetron (ZOFRAN-ODT) 4 MG disintegrating tablet Take 1 tablet by mouth every 8 hours as needed for Nausea or Vomiting 5/13/22   Osmin Hadley MD   senna (SENOKOT) 8.6 MG tablet Take 1 tablet by mouth 2 times daily 5/2/22 5/2/23  Tejinder Bryant MD   melatonin 5 MG TABS tablet Take 1 tablet by mouth daily 4/13/22   Shanda Medhkour, DO   benzonatate (TESSALON PERLES) 100 MG capsule Take 1 capsule by mouth 3 times daily as needed for Cough 4/13/22   Shanda Medhkour, DO   apixaban (ELIQUIS) 5 MG TABS tablet Take 1 tablet by mouth 2 times daily 4/8/22   Osmin Hadley MD   hydrocortisone (ANUSOL-HC) 2.5 % CREA rectal cream Apply on affected area twice daily 2/28/22   Issac Leyva MD     Current Facility-Administered Medications   Medication Dose Route Frequency Provider Last Rate Last Admin    magnesium sulfate 2000 mg in 50 mL IVPB premix  2,000 mg IntraVENous Once Hugo Wells MD 25 mL/hr at 10/07/22 1745 2,000 mg at 10/07/22 1745    [START ON 10/8/2022] levoFLOXacin (LEVAQUIN) tablet 500 mg  500 mg Oral Daily Hugo Wells MD        lacosamide (VIMPAT) tablet 100 mg  100 mg Oral BID ESTRELLITA Martinez CNP   100 mg at 10/07/22 0812    levETIRAcetam (KEPPRA) tablet 1,000 mg  1,000 mg Oral BID Kasey Hillman DO   1,000 mg at 10/07/22 0811    famotidine (PEPCID) tablet 20 mg  20 mg Oral BID Tray Sauer DO   20 mg at 10/07/22 7336    docusate (COLACE) 50 MG/5ML liquid 100 mg  100 mg Oral Daily Hugo Wells MD   100 mg at 10/06/22 0800 apixaban (ELIQUIS) tablet 5 mg  5 mg Oral BID Doreene Elver, DO   5 mg at 10/07/22 9174    furosemide (LASIX) tablet 20 mg  20 mg Oral Daily Doreene Elver, DO   20 mg at 10/07/22 8288    lamoTRIgine (LAMICTAL) tablet 125 mg  125 mg Oral BID Doreene Elver, DO   125 mg at 10/07/22 0811    oxyCODONE-acetaminophen (PERCOCET) 5-325 MG per tablet 1 tablet  1 tablet Oral Q4H PRN Doreene Elver, DO        sertraline (ZOLOFT) tablet 100 mg  100 mg Oral Daily Doreene Elver, DO   100 mg at 10/07/22 7331    melatonin tablet 5 mg  5 mg Oral Nightly Doreene Elver, DO   5 mg at 10/06/22 2026    morphine (PF) injection 2 mg  2 mg IntraVENous Q4H PRN Dordele Elver, DO   2 mg at 10/05/22 0228    albuterol (PROVENTIL) nebulizer solution 2.5 mg  2.5 mg Nebulization Q6H PRN Pollo Pond MD   2.5 mg at 10/05/22 2118    midazolam PF (VERSED) injection 1 mg  1 mg IntraVENous Q2H PRN Mir Patterson MD   1 mg at 10/05/22 0515    racepinephrine HCl (VAPONEFPRIN) 2.25 % nebulizer solution NEBU 11.25 mg  11.25 mg Nebulization Q3H PRN Pollo Pond MD        sodium chloride nebulizer 0.9 % solution 3 mL  3 mL Nebulization Q4H PRN Mir Patterson MD        sodium chloride flush 0.9 % injection 5-40 mL  5-40 mL IntraVENous 2 times per day Doreene Elver, DO   10 mL at 10/07/22 0819    sodium chloride flush 0.9 % injection 5-40 mL  5-40 mL IntraVENous PRN Doreene Elver, DO        0.9 % sodium chloride infusion   IntraVENous PRN Doreene Elver, DO   Stopped at 10/07/22 0503    ondansetron (ZOFRAN-ODT) disintegrating tablet 4 mg  4 mg Oral Q8H PRN Doreene Elver, DO        Or    ondansetron [facility]) injection 4 mg  4 mg IntraVENous Q6H PRN Larry Boxey, DO   4 mg at 10/06/22 2101    polyethylene glycol (GLYCOLAX) packet 17 g  17 g Oral Daily PRN Larry Boxey, DO        acetaminophen (TYLENOL) tablet 650 mg  650 mg Oral Q6H PRN Larry Boxey, DO   650 mg at 10/07/22 1156    Or    acetaminophen (TYLENOL) suppository 650 mg  650 mg Rectal Q6H PRN Janette Fiona, DO        potassium chloride 20 mEq/50 mL IVPB (Central Line)  20 mEq IntraVENous PRN Janette Fiona, DO   Stopped at 10/05/22 1550    Or    potassium chloride 10 mEq/100 mL IVPB (Peripheral Line)  10 mEq IntraVENous PRN Janette Fiona,  mL/hr at 10/07/22 1159 10 mEq at 10/07/22 1159       Allergies:  Carboplatin and Ceftriaxone    REVIEW OF SYSTEMS:      General: Positive for weakness and fatigue. Positive for weight loss and decreased appetite. No fever or chills. Eyes: No blurred vision, eye pain or double vision. Ears: No hearing problems or drainage. No tinnitus. Throat: No sore throat, problems with swallowing or dysphagia. Respiratory: No cough, sputum or hemoptysis. No shortness of breath. No pleuritic chest pain. Cardiovascular: No chest pain, orthopnea or PND. No lower extremity edema. No palpitation. Gastrointestinal: No problems with swallowing. No abdominal pain or bloating. No nausea or vomiting. No diarrhea or constipation. No GI bleeding. Genitourinary: No dysuria, hematuria, frequency or urgency. Musculoskeletal: No muscle aches or pains. No limitation of movement. No back pain. No gait disturbance, No joint complaints. Dermatologic: No skin rashes or pruritus. No skin lesions or discolorations. Psychiatric: As above. Hematologic: No history of bleeding tendency. No bruises or ecchymosis. No history of clotting problems. Infectious disease: No fever, chills or frequent infections. Endocrine: No polydipsia or polyuria. No temperature intolerance. Neurologic: No headaches or dizziness. No weakness or numbness of the extremities. No changes in balance, coordination,  memory, mentation, behavior. Allergic/Immunologic: No nasal congestion or hives. No repeated infections.        PHYSICAL EXAM:      BP (!) 158/90   Pulse 85   Temp 98.4 °F (36.9 °C) (Oral)   Resp 23   Ht 5' 5\" (1.651 m)   Wt 151 lb 3.8 oz (68.6 kg)   SpO2 100%   BMI 25.20 kg/m²    Temp (24hrs), Av.6 °F (37 °C), Min:98.1 °F (36.7 °C), Max:99.3 °F (37.4 °C)      General appearance - not in pain or distress  Mental status - alert and oriented  Eyes - pupils equal and reactive, extraocular eye movements intact  Ears - bilateral TM's and external ear canals normal  Nose - normal and patent, no erythema, discharge or polyps  Mouth - mucous membranes moist, pharynx normal without lesions  Neck - supple, no significant adenopathy  Lymphatics - no palpable lymphadenopathy, no hepatosplenomegaly  Chest - clear to auscultation, no wheezes, rales or rhonchi, symmetric air entry  Heart - normal rate, regular rhythm, normal S1, S2, no murmurs, rubs, clicks or gallops  Abdomen - soft, nontender, nondistended, no masses or organomegaly  Neurological - alert, oriented, normal speech, no focal findings or movement disorder noted  Musculoskeletal - no joint tenderness, deformity or swelling  Extremities - peripheral pulses normal, no pedal edema, no clubbing or cyanosis  Skin - normal coloration and turgor, no rashes, no suspicious skin lesions noted           DATA:      Labs:       CBC:   Recent Labs     10/06/22  0403 10/07/22  0443   WBC 4.6 8.8   HGB 12.5 12.3   HCT 37.7 39.0   PLT See Reflexed IPF Result See Reflexed IPF Result     BMP:   Recent Labs     10/06/22  0403 10/06/22  1208 10/07/22  0443 10/07/22  1429     --  145*  --    K 3.5*   < > 3.0* 3.9   CO2 27  --  24  --    BUN 3*  --  3*  --    CREATININE 0.36*  --  0.64  --    LABGLOM >60  --  >60  --    GLUCOSE 113*  --  126*  --     < > = values in this interval not displayed. PT/INR: No results for input(s): PROTIME, INR in the last 72 hours. APTT:No results for input(s): APTT in the last 72 hours. LIVER PROFILE:No results for input(s): AST, ALT, LABALBU in the last 72 hours.   XR CHEST PORTABLE  Narrative: EXAMINATION:  ONE XRAY VIEW OF THE CHEST    10/5/2022 6:28 am    COMPARISON:  10/03/2022, 06/03/2022    HISTORY:  ORDERING SYSTEM PROVIDED HISTORY: Wheezing  TECHNOLOGIST PROVIDED HISTORY:  Wheezing    FINDINGS:  Endotracheal and enteric tubes have been removed. Right internal jugular  port remains. Interstitial opacities appear more prominent in the interval.  Patchy opacities in the lung bases again demonstrated. No focal area of  consolidation, pneumothorax or effusion. Impression: Interval extubation. Interstitial opacities have increased in the interval,  for which developing edema should be considered. IMPRESSION:    Primary Problem  Septicemia [facility])    Active Hospital Problems    Diagnosis Date Noted    Septicemia ([facility] [address] [A41.9] 10/03/2022     Priority: High    Metabolic encephalopathy [G69.96]     AMS (altered mental status) [R41.82] 02/20/2022    Seizure disorder, nonconvulsive, with status epilepticus ([facility] [address] [G40.901] 01/04/2021   Altered mental status. Sepsis. Recurrent metastatic ovarian cancer on chemotherapy treatment. RECOMMENDATIONS:  Records and labs and images were reviewed and discussed with the patient. Patient was treated for sepsis and she was extubated successfully. She had metabolic encephalopathy which has resolved. Currently patient has normal mental status. Patient will be transferred out of the ICU. Patient is currently on active chemotherapy treatment for ovarian cancer. Last treatment was September 29, 2022. We will monitor labs closely. Further management for ovarian cancer will be as outpatient. Patient's questions were answered to the best of her satisfaction and she verbalized full understanding and agreement. Thank you for allowing us to participate in the care of this pleasant patient. Discussed with patient and Nurse.     Michael Nuñez MD, MD [facility]                            This note is created with the assistance of a speech recognition program.  While intending to generate a document that actually reflects the content of the visit, the document can still have some errors including those of syntax and sound a like substitutions which may escape proof reading. It such instances, actual meaning can be extrapolated by contextual diversion.

## 2022-10-07 NOTE — PROGRESS NOTES
NEUROLOGY INPATIENT PROGRESS NOTE    10/7/2022         Current Exam:     Chart reviewed. Discussed with RN. Patient condition overall improving. No seizures overnight on LTME. She reports visual and auditory hallucinations; RN reports patient's daughter stated this happened before when she had seizures. Overall the patient does not have any acute complaints. Does get tearful speaking of her sons deaths. Brief History:    Gwen Fink is a  59 y.o. female with H/O prior TX ES with new onset seizure disorder in October 2020, recurrent ovarian cancer with peritoneal and lung mets, intra-abdominal bleeding due to splenic mass with GI infiltration status post embolization in 2020, HTN, DM, and chronic DVT, who was admitted on 10/3/2022 with acute onset altered mentation. Patient's daughter reported when they went to wake her she was difficult to arouse and another daughter saw her on the bedroom camera vomiting, having incontinence and behaving in an erratic way. Reportedly seizure activity was witnessed. Patient has had multiple hospitalizations for breakthrough seizures with negative work-ups and AED adjustments. She had been on Keppra 1 g twice daily and Lamictal 125 mg twice daily prior to arrival.  Neurology was consulted on 10/4 for ongoing disorientation and agitation following extubation on 10/4. She was started on LTME showing evidence of multiple seizures. Vimpat was added to her regimen. No current facility-administered medications on file prior to encounter. Current Outpatient Medications on File Prior to Encounter   Medication Sig Dispense Refill    morphine (MS CONTIN) 15 MG extended release tablet TAKE 1 TABLET BY MOUTH 2 TIMES DAILY FOR 30 DAYS. 60 tablet 0    lamoTRIgine (LAMICTAL) 25 MG tablet Take 5 tablets by mouth 2 times daily 300 tablet 2    LORazepam (ATIVAN) 1 MG tablet TAKE 1 TABLET BY MOUTH EVERY 8 HOURS AS NEEDED FOR ANXIETY FOR UP TO 30 DAYS.  90 tablet 0 morphine (MS CONTIN) 30 MG extended release tablet TAKE 1 TABLET BY MOUTH 2 TIMES DAILY FOR 30 DAYS. 60 tablet 0    sertraline (ZOLOFT) 100 MG tablet TAKE 1 TABLET BY MOUTH DAILY 30 tablet 4    oxyCODONE-acetaminophen (PERCOCET) 5-325 MG per tablet Take 1 tablet by mouth every 4 hours as needed for Pain for up to 30 days. 180 tablet 0    FEROSUL 325 (65 Fe) MG tablet TAKE 1 TABLET BY MOUTH ONCE DAILY WITH BREAKFAST 30 tablet 2    levETIRAcetam (KEPPRA) 1000 MG tablet TAKE 1 TABLET (1,000 MG TOTAL) BY MOUTH IN THE MORNING AND 1 TABLET (1,000 MG TOTAL) BEFORE BEDTIME. 60 tablet 3    FEROSUL 325 (65 Fe) MG tablet TAKE 1 TABLET (325 MG TOTAL) BY MOUTH DAILY WITH BREAKFAST. 30 tablet 2    pantoprazole (PROTONIX) 40 MG tablet Take 1 tablet by mouth daily 30 tablet 3    Lactobacillus (ACIDOPHILUS) CAPS capsule TAKE 1 TABLET BY MOUTH ONCE DAILY IN THE MORNING AND 1 TABLET BEFORE BEDTIME 60 capsule 5    furosemide (LASIX) 20 MG tablet TAKE 1 TABLET (20 MG TOTAL) BY MOUTH DAILY. 30 tablet 3    dicyclomine (BENTYL) 20 MG tablet TAKE 1 TABLET (20 MG TOTAL) BY MOUTH IN THE MORNING AND 1 TABLET (20 MG TOTAL) BEFORE BEDTIME. 60 tablet 3    potassium chloride (MICRO-K) 10 MEQ extended release capsule TAKE 1 CAPSULE (10 MEQ TOTAL) BY MOUTH IN THE MORNING.  30 capsule 3    Handicap Placard MISC 5 years 1 each 0    loperamide (IMODIUM) 2 MG capsule Take 2 mg by mouth 4 times daily as needed for Diarrhea      ondansetron (ZOFRAN-ODT) 4 MG disintegrating tablet Take 1 tablet by mouth every 8 hours as needed for Nausea or Vomiting 60 tablet 2    senna (SENOKOT) 8.6 MG tablet Take 1 tablet by mouth 2 times daily 60 tablet 11    melatonin 5 MG TABS tablet Take 1 tablet by mouth daily 30 tablet 3    benzonatate (TESSALON PERLES) 100 MG capsule Take 1 capsule by mouth 3 times daily as needed for Cough 30 capsule 1    apixaban (ELIQUIS) 5 MG TABS tablet Take 1 tablet by mouth 2 times daily 60 tablet 5    hydrocortisone (ANUSOL-HC) 2.5 % CREA rectal cream Apply on affected area twice daily 1 each 1       Allergies: Gonsalo Mitchell is allergic to carboplatin and ceftriaxone. Past Medical History:   Diagnosis Date    Anemia     Bleeding 10/2020    intra-abdominal bleeding -due to splenic mass with GI infiltration. Status post embolization    Cervical cancer (HCC)     Depression     Diabetes mellitus ([facility]     GERD (gastroesophageal reflux disease)     Hx of blood clots     Hypertension     Metastatic cancer ([facility] 10/2020    extensive intraabdominal and splenic involvement and lung mets. Ovarian cancer ([facility]     low grade serous ovarian carcinoma    Post chemo evaluation     2007: Chemo via med onc (Dr. Long Reyez), 2008: Ozell Call due to rising CA-125, 2013: intraperitoneal chemo,12/2015: Ca125 - 25     PRES (posterior reversible encephalopathy syndrome)     Splenic lesion        Past Surgical History:   Procedure Laterality Date    ABSCESS DRAINAGE  2013    Franca rectal    ANUS SURGERY      ANAL FISSURECTOMY    CARDIAC CATHETERIZATION      COLECTOMY  03/2013    ex lap, tumor debulking, transverse colectomy w reanastamosis, subgastric omentectomy, intraperitoneal port placement    HYSTERECTOMY, TOTAL ABDOMINAL (CERVIX REMOVED)      IR EMBOLIZATION HEMORRHAGE  10/05/2020    intra-abdominal bleeding -due to splenic mass with GI infiltration. Status post embolization [employer] interlock coils x7. mri condtional 3t ok, safe immediately post implant. IR PORT PLACEMENT EQUAL OR GREATER THAN 5 YEARS  08/24/2020    IR PORT PLACEMENT EQUAL OR GREATER THAN 5 YEARS 8/24/2020 Tacos Posadas MD [facility] SPECIAL PROCEDURES    PORT SURGERY      IP Port    TONSILLECTOMY         Social History: Gonsalo Mitchell  reports that she has been smoking cigarettes. She has a 20.00 pack-year smoking history. She has never used smokeless tobacco. She reports that she does not currently use alcohol. She reports that she does not use drugs.     Family History   Problem Relation Age of Onset    Alcohol Abuse Mother     Cirrhosis Mother        Objective:   BP (!) 165/92 Comment: agitated/restless/provider aware  Pulse (!) 105   Temp 98.6 °F (37 °C) (Oral)   Resp 21   Ht 5' 5\" (1.651 m)   Wt 151 lb 3.8 oz (68.6 kg)   SpO2 100%   BMI 25.20 kg/m²     Blood pressure range: Systolic (86MJE), WFU:806 , Min:119 , JMO:825   ; Diastolic (56UEG), AXY:64, Min:68, Max:134      Review of Systems:  Constitutional  Negative for fever and chills    HEENT  Negative for ear discharge, ear pain, nosebleed    Eyes  Negative for photophobia, pain and discharge    Respiratory  Negative for hemoptysis and sputum    Cardiovascular  Negative for orthopnea, claudication and PND    Gastrointestinal  Negative for abdominal pain, diarrhea, blood in stool    Musculoskeletal  Negative for joint pain, negative for myalgia    Skin  Negative for rash or itching    Endo/heme/allergies  Negative for polydipsia, environmental allergy    Psychiatric/behavioral  Negative for suicidal ideation. Patient is not anxious        NEUROLOGIC EXAMINATION  GENERAL  Appears comfortable and in no distress   HEENT  NC/ AT   NECK  Supple   MENTAL STATUS:  Alert, oriented to person, place, states year is 2023, normal speech, normal language, + hallucination. Intact naming. Follows simple commands.    CRANIAL NERVES: II     -      Visual fields intact to confrontation  III,IV,VI -  EOMs full, no afferent defect, no JOSE A, no ptosis  V     -     Normal facial sensation  VII    -     Normal facial symmetry  VIII   -     Intact hearing  IX,X -     Symmetrical palate  XI    -     Symmetrical shoulder shrug  XII   -     Midline tongue, no atrophy    MOTOR FUNCTION:  Lifts all limbs antigravity with normal bulk, normal tone and no involuntary movements, no tremor   SENSORY FUNCTION:  Normal touch, normal pin   CEREBELLAR FUNCTION:  Intact fine motor control over upper limbs   REFLEX FUNCTION:  Symmetric, no perverted reflex, no Babinski sign   STATION and GAIT  Not tested       Data:    Lab Results:   CBC:   Recent Labs     10/05/22  0500 10/06/22  0403 10/07/22  0443   WBC 8.4 4.6 8.8   HGB 12.7 12.5 12.3    See Reflexed IPF Result See Reflexed IPF Result     BMP:    Recent Labs     10/05/22  0500 10/05/22  1658 10/06/22  0403 10/06/22  1208 10/07/22  0443    139 140  --  145*   K 2.6* 3.1* 3.5* 3.6* 3.0*    102 104  --  108*   CO2 26 26 27  --  24   BUN 3*  --  3*  --  3*   CREATININE 0.35*  --  0.36*  --  0.64   GLUCOSE 106*  --  113*  --  126*         Lab Results   Component Value Date    CHOL 131 03/10/2021    LDLCHOLESTEROL 69 03/10/2021    HDL 33 (L) 03/10/2021    TRIG 147 03/10/2021    ALT 9 10/03/2022    AST 18 10/03/2022    TSH 2.93 03/09/2021    INR 1.2 05/03/2022    LABA1C 5.2 04/13/2022    [id number] 06/04/2022           Diagnostic data reviewed:  CT HEAD (10/3/2022): No acute intracranial abnormality        MRI BRAIN (10/4/2022):   1. New subtle focus of T2/FLAIR hyperintensity in lateral L frontal cortex & subcortical white matter    is nonspecific and may reflect sequela of prior infarct or mild PRES. 2. Stable small foci of R occipital encephalomalacia; sequela of prior insult. MRI BRAIN W/WO: Held for now due to mild improvement in mentation        CTA HEAD & NECK (10/3/2022): No LVO        ECHO (2/22/2022): EF 55%       LTME (10/4 -   Day 1: During the recording, the patient had 1 subclinical seizure lasting about 40 seconds originating from L hemisphere. The interictal EEG was abnormal due to diffuse polymorphic theta slowing suggesting mild to moderate encephalopathy. Continuous L hemispheric slowing suggested underlying structural defect. frequent L central parietal sharp waves and lateralized periodic discharges conferred an increased risk for focal onset seizures     Day 2: During the recording, the patient had 11 electroclinical seizures with L hand flapping & L head deviation. Electrographically, these seizures were similar to yesterday seizures. The interictal EEG was abnormal due to diffuse polymorphic theta slowing suggesting mild to moderate encephalopathy. Continuous L hemispheric slowing suggested underlying structural defect. Frequent L central parietal sharp waves & lateralized periodic discharges (LPDs) conferred an increased risk for focal onset seizures     Day 3: During the recording, the patient had 6 L hemispheric onset seizures. Electrographically, these seizures were similar to yesterday seizures. The interictal EEG was abnormal due to diffuse polymorphic theta slowing suggesting mild to moderate encephalopathy. Continuous L hemispheric slowing suggested underlying structural defect. Frequent L central parietal sharp waves & lateralized periodic discharges (LPDs) conferred an increased risk for focal onset seizures    Day 4: During this day of recording no events were recorded. Continuous left hemispheric slowing suggested underlying structural defect. Occasional left central parietal sharp waves and lateralized periodic discharges (LPDs) conferred an increased risk for focal onset seizures. Impression:  -Acute metabolic encephalopathy in the setting of hypertensive urgency, febrile illness with positive SIRS criteria and subclinical status  -MRI brain with hyperintensity in lateral left frontal cortex and subcortical white matter, ? mild PRES  -Subclinical seizures; last seizure 10/6/22    Plan:  -MRI brain with and without contrast is pending, holding at this point in time due to improved mentation  -Continue LTME, no seizures since 10/6  -Continue Vimpat 100 mg twice daily, Keppra 1 g twice daily, Lamictal 125 mg twice daily  -Patient continues on antibiotics  -Continue therapies  -We will follow    Please note that this note was generated using a voice recognition dictation software.  Although every effort was made to ensure the accuracy of this automated

## 2022-10-07 NOTE — PROGRESS NOTES
Critical Care Team - Daily Progress Note      Date and time: 10/7/2022 8:33 AM  Patient's name:  Del Zapata  Medical Record Number: [id number]  Patient's account/billing number: [de-identified]  Patient's YOB: 1965  Age: 59 y.o. Date of Admission: 10/3/2022  1:07 PM  Length of stay during current admission: 4      Primary Care Physician: Alie Escobar DO  ICU Attending Physician: Dr. Braxton Arias Status: Full Code    Reason for ICU admission:   Chief Complaint   Patient presents with    Altered Mental Status         SUBJECTIVE:   HPI:  Patient is a 47-year-old female with past medical history of metastatic ovarian cancer with multiple recurrences presented to the emergency department with altered mental status. Was found by daughter very drowsy on 10/2, daughters that she was tired and let her sleep. On return patient was increasingly lethargic and altered, daughter called 911. Patient came to the emergency department with EMS with a GCS of 8. She was found to be febrile and tachycardic, she was intubated for airway protection sepsis work-up was initiated in the emergency department. Patient was given 2 L fluid bolus in the emergency department, was hypertensive and tachycardic, agitated with the endotracheal tube. CT head was negative for any acute processes including cranial metastases. Initial VBG with pH of 7.47, PCO2 of 34.1, bicarb of 25.2 and a lactic acid of 1.56. Patient was started on broad-spectrum antibiotics and see department. Patient did have history of intra-abdominal bleeding secondary to splenic mass with GI infiltration, status postembolization of the spleen. 10/6/2022: Neurology to be started Keppra 1 g home dose and increased it to 1 g twice daily. GI recommended no dilatation at this moment. Interval history:  - Patient examined at bedside.   Today, the patient was alert and oriented to time place and person.  -Patient denied any headache, fever, chills, shortness of breath, chest pain, cough, urinary complaints, difficulty passing stool.  -Brownlee's catheter was taken out yesterday. Patient on external urinary catheter. Patient has no difficulty micturition or dysuria.  -Patient awaiting repeat MRI. Neurology following.  -Patient on adult oral diet. Normal saline stopped. -Patient still complains of abdominal pain. OVERNIGHT EVENTS:       No acute events overnight.     AWAKE & FOLLOWING COMMANDS:  [] No   [x] Yes    CURRENT VENTILATION STATUS:     [] Ventilator  [] BIPAP  [] Nasal Cannula [x] Room Air        SECRETIONS Amount:  [] Small [] Moderate  [] Large  [x] None  Color:     [] White [] Colored  [] Bloody    SEDATION:  =  [] Propofol gtt  [] Versed gtt  [] Ativan gtt   [x] No Sedation    PARALYZED:  [x] No    [] Yes    DIARRHEA:                [x] No                [] Yes  (C. Difficile status: [] positive                                                                                                                       [] negative                                                                                                                     [] pending)    VASOPRESSORS:  [x] No    [] Yes    If yes -   [] Levophed       [] Dopamine     [] Vasopressin       [] Dobutamine  [] Phenylephrine         [] Epinephrine    CENTRAL LINES:     [x] No   [] Yes   (Date of Insertion:   )           If yes -     [] Right IJ     [] Left IJ [] Right Femoral [] Left Femoral                   [] Right Subclavian [] Left Subclavian       BROWNLEE'S CATHETER:   [x] No   [] Yes  (Date of Insertion:   )     URINE OUTPUT:            [x] Good   [] Low              [] Anuric      OBJECTIVE:     VITAL SIGNS:  BP (!) 165/92 Comment: agitated/restless/provider aware  Pulse (!) 105   Temp 98.6 °F (37 °C) (Oral)   Resp 21   Ht 5' 5\" (1.651 m)   Wt 151 lb 3.8 oz (68.6 kg)   SpO2 100%   BMI 25.20 kg/m²   Tmax over 24 hours:  Temp (24hrs), Av.6 °F (37 °C), Min:98.1 °F (36.7 °C), Max:99.3 °F (37.4 °C)      Patient Vitals for the past 8 hrs:   BP Temp Temp src Pulse Resp SpO2 Weight   10/07/22 0800 (!) 165/92 98.6 °F (37 °C) Oral (!) 105 21 100 % --   10/07/22 0700 (!) 160/100 -- -- 88 22 100 % --   10/07/22 0600 (!) 145/69 99.3 °F (37.4 °C) Oral 83 20 100 % 151 lb 3.8 oz (68.6 kg)   10/07/22 0500 (!) 149/82 -- -- 86 25 100 % --   10/07/22 0400 (!) 142/80 99.3 °F (37.4 °C) Oral 99 28 100 % --   10/07/22 0300 (!) 157/83 -- -- 80 23 100 % --   10/07/22 0200 135/74 98.6 °F (37 °C) Oral 97 21 100 % --   10/07/22 0100 (!) 141/74 -- -- 88 24 99 % --         Intake/Output Summary (Last 24 hours) at 10/7/2022 0833  Last data filed at 10/7/2022 3417  Gross per 24 hour   Intake 5428.55 ml   Output 4050 ml   Net 1378.55 ml     Date 10/07/22 0000 - 10/07/22 2359   Shift 5333-2849 6218-2646 0159-2190 24 Hour Total   INTAKE   P.O.(mL/kg/hr) 585(1.1)   585   I.V.(mL/kg) 1270.1(18.5)   1270.1(18.5)   IV Piggyback(mL/kg) 543.7(7.9)   543.7(7.9)   Shift Total(mL/kg) 3060.0(31)   0976.3(47)   OUTPUT   Shift Total(mL/kg)       Weight (kg) 68.6 68.6 68.6 68.6     Wt Readings from Last 3 Encounters:   10/07/22 151 lb 3.8 oz (68.6 kg)   10/05/22 153 lb (69.4 kg)   10/04/22 153 lb (69.4 kg)     Body mass index is 25.2 kg/m². PHYSICAL EXAM:  Constitutional: Appears well, no distress  EENT: PERRLA, EOMI, sclera clear. Neck: Supple, symmetrical, trachea midline. Respiratory: clear to auscultation, no wheezes or rales and unlabored breathing. No intercostal tenderness  Cardiovascular: regular rate and rhythm, normal S1, S2, no murmur noted and 2+ pulses throughout  Abdomen: Minimal tenderness on abdominal exam.  NEUROLOGIC: Awake, alert, oriented to name, place and time. .  Extremities:  peripheral pulses normal, no pedal edema, no clubbing or cyanosis  SKIN: normal coloration and turgor    Any additional physical findings:      MEDICATIONS:  Scheduled Meds:   lacosamide  100 mg Oral BID    levETIRAcetam 1,000 mg Oral BID    famotidine  20 mg Oral BID    vancomycin  1,000 mg IntraVENous Q8H    docusate  100 mg Oral Daily    apixaban  5 mg Oral BID    furosemide  20 mg Oral Daily    lamoTRIgine  125 mg Oral BID    sertraline  100 mg Oral Daily    melatonin  5 mg Oral Nightly    sodium chloride flush  5-40 mL IntraVENous 2 times per day    piperacillin-tazobactam  3,375 mg IntraVENous Q8H    vancomycin (VANCOCIN) intermittent dosing (placeholder)   Other RX Placeholder     Continuous Infusions:   sodium chloride Stopped (10/07/22 0503)     PRN Meds:   oxyCODONE-acetaminophen, 1 tablet, Q4H PRN  morphine, 2 mg, Q4H PRN  albuterol, 2.5 mg, Q6H PRN  midazolam, 1 mg, Q2H PRN  racepinephrine HCl, 11.25 mg, Q3H PRN  sodium chloride nebulizer, 3 mL, Q4H PRN  sodium chloride flush, 5-40 mL, PRN  sodium chloride, , PRN  ondansetron, 4 mg, Q8H PRN   Or  ondansetron, 4 mg, Q6H PRN  polyethylene glycol, 17 g, Daily PRN  acetaminophen, 650 mg, Q6H PRN   Or  acetaminophen, 650 mg, Q6H PRN  potassium chloride, 20 mEq, PRN   Or  potassium chloride, 10 mEq, PRN        SUPPORT DEVICES: [] Ventilator [] BIPAP  [] Nasal Cannula [x] Room Air    Vent Information  Ventilator Day(s): 1  Ventilator ID: [id number]  Equipment Changed: Expiratory Filter, HME  Ventilator Initiate: Yes  Ventilator Discontinue: Yes  Vent Mode: AC/PRVC  Additional Respiratory Assessments  Heart Rate: (!) 105  Resp: 21  SpO2: 100 %  End Tidal CO2: 36 (%)  Position: Semi-Jolley's  Humidification Source: E      Lab Results   Component Value Date/Time    RKV6DPL NOT REPORTED 06/19/2021 03:25 AM    FIO2 40.0 10/04/2022 04:16 AM     Lactic Acid:   Lab Results   Component Value Date/Time    LACTA NOT REPORTED 06/20/2021 09:39 AM    LACTA NOT REPORTED 06/20/2021 03:53 AM    LACTA NOT REPORTED 06/19/2021 11:52 PM         DATA:  Complete Blood Count:   Recent Labs     10/05/22  0500 10/06/22  0403 10/07/22  0443   WBC 8.4 4.6 8.8   HGB 12.7 12.5 12.3   MCV 91.8 92.6 92.9  See Reflexed IPF Result See Reflexed IPF Result   RBC 4.27 4.07 4.20   HCT 39.2 37.7 39.0   MCH 29.7 30.7 29.3   MCHC 32.4 33.2 31.5   RDW 16.4* 16.3* 16.2*   MPV 9.7  --   --         PT/INR:    Lab Results   Component Value Date/Time    PROTIME 12.2 05/03/2022 05:28 PM    INR 1.2 05/03/2022 05:28 PM     PTT:    Lab Results   Component Value Date/Time    APTT 19.5 05/03/2022 05:28 PM       Basal Metabolic Profile:   Recent Labs     10/05/22  0500 10/05/22  1658 10/06/22  0403 10/06/22  1208 10/07/22  0443    139 140  --  145*   K 2.6* 3.1* 3.5* 3.6* 3.0*   BUN 3*  --  3*  --  3*   CREATININE 0.35*  --  0.36*  --  0.64    102 104  --  108*   CO2 26 26 27  --  24      Magnesium:   Lab Results   Component Value Date/Time    MG 1.6 10/06/2022 04:03 AM    MG 1.7 10/05/2022 05:00 AM    MG 1.5 10/03/2022 01:41 PM     Phosphorus:   Lab Results   Component Value Date/Time    PHOS 3.6 06/22/2021 04:14 AM    PHOS 2.7 06/21/2021 04:49 AM    PHOS 2.8 06/20/2021 03:53 AM     S. Calcium:  Recent Labs     10/07/22  0443   CALCIUM 8.7     S. Ionized Calcium:No results for input(s): IONCA in the last 72 hours. Urinalysis:   Lab Results   Component Value Date/Time    NITRU NEGATIVE 10/03/2022 02:38 PM    COLORU Yellow 10/03/2022 02:38 PM    PHUR 6.5 10/03/2022 02:38 PM    WBCUA None 10/03/2022 02:38 PM    RBCUA 2 TO 5 10/03/2022 02:38 PM    MUCUS NOT REPORTED 02/20/2022 10:40 AM    TRICHOMONAS NOT REPORTED 02/20/2022 10:40 AM    YEAST NOT REPORTED 02/20/2022 10:40 AM    BACTERIA NOT REPORTED 02/20/2022 10:40 AM    SPECGRAV 1.018 10/03/2022 02:38 PM    LEUKOCYTESUR NEGATIVE 10/03/2022 02:38 PM    UROBILINOGEN Normal 10/03/2022 02:38 PM    BILIRUBINUR NEGATIVE 10/03/2022 02:38 PM    GLUCOSEU 1+ 10/03/2022 02:38 PM    KETUA SMALL 10/03/2022 02:38 PM    AMORPHOUS NOT REPORTED 02/20/2022 10:40 AM       CARDIAC ENZYMES: No results for input(s): CKMB, CKMBINDEX, TROPONINI in the last 72 hours.     Invalid input(s): CKTOTAL;3  BNP: No results for input(s): BNP in the last 72 hours. LFTS  No results for input(s): ALKPHOS, ALT, AST, BILITOT, BILIDIR, LABALBU in the last 72 hours. AMYLASE/LIPASE/AMMONIA  No results for input(s): AMYLASE, LIPASE, AMMONIA in the last 72 hours. Last 3 Blood Glucose:   Recent Labs     10/05/22  0500 10/06/22  0403 10/07/22  0443   GLUCOSE 106* 113* 126*      HgBA1c:    Lab Results   Component Value Date/Time    LABA1C 5.2 04/13/2022 01:10 PM         TSH:    Lab Results   Component Value Date/Time    TSH 2.93 03/09/2021 05:43 PM     ANEMIA STUDIES  No results for input(s): LABIRON, TIBC, FERRITIN, [id number], FOLATE, OCCULTBLD in the last 72 hours. Cultures during this admission:     Blood cultures:                 [] None drawn      [x] Negative             []  Positive (Details:  )  Urine Culture:                   [] None drawn      [x] Negative             []  Positive (Details:  )  Sputum Culture:               [x] None drawn       [] Negative             []  Positive (Details:  )   Endotracheal aspirate:     [x] None drawn       [] Negative             []  Positive (Details:  )             Chest Xray (10/7/2022): Impression   Interval extubation. Interstitial opacities have increased in the interval,   for which developing edema should be considered. CT chest abdomen pelvis with contrast:  Impression   1. Progressive bibasilar and left lower lobe consolidative change which may   represent atelectasis versus superimposed pneumonia. 2. Redemonstration of thoracic and abdominal metastatic disease with mixed   change. Most lesions appear stable with a few interval progression (right   axillary metastatic lymphadenopathy. Retroperitoneal left periaortic   metastatic lymphadenopathy, pelvic presacral cystic mass). 3. Redemonstration of peritoneal carcinomatosis. No significant ascites. 4. Stable blastic skeletal metastasis.    5. Moderate transverse and descending colonic distention with liquid stool. Abrupt transition in the mid sigmoid colon which may relate to malignant   partial obstruction or stricture. Mild interval improvement of colonic   edema. No evidence of colonic perforation or pneumatosis        ASSESSMENT:     @HPROB    FLUIDS:  FEEDING:Adult regular  ANALGESICS:Oxycodone, morphine, tylenol  SEDATION: No Sedation/Analgesia  THROMBO- PROPHYLAXIS: Eliquis  MOBILIZATION:PT/OT not following  HEADS UP:  HEMODYNAMICS: 165/92 (106); no pressors  ULCER PROPHYLAXIS: Pepcid   GLYCEMIC CONTROL:None  SPONTANEOUS BREATHING TRIAL:Room air  BOWEL MANAGEMENT:Colace 100 mg oral daily  INDWELLING CATHETER: Urine cath taken out; external urinary cath  DRUG DE-ESCALATION: Mercado D/C  PLAN:     PLAN/MEDICAL DECISION MAKING:  Neurologic:   Neuro intact  Neuro checks per protocol  no sedation  LTM is showing patient having multiple seizures since last night as per neurology note. Neuro checks per protocol  no sedation, Ativan in the morning due to seizure activity. Patient loaded with 2 g Keppra 1 mg Ativan in the morning. Keppra maintenance dose changed from 1 g at bedtime to 1 g twice daily. Precedex stopped on 10/4/2022. Neuro planning to repeat MRI and possibly add another AED agent. Repeat MRI on hold. Neuro planning for patient to get to baseline neurologically. Today, the patient was oriented than yesterday. Cardiovascular:  Hemodynamically stable  MAP goal >65  No pressors  Pulmonary:  Maintain oxygen sats >92%  Pulmonary toilet  Currently on room air  Vent Information  Ventilator Day(s): 1  Ventilator ID: [id number]  Equipment Changed: Expiratory Filter, HME  Ventilator Initiate: Yes  Ventilator Discontinue: Yes  Vent Mode: AC/PRVC  GI/Nutrition  Colace 100 mg oral daily  Ulcer Prophylaxis: H2 blockers Pepcid 20 mg oral 2 times daily  Diet:ADULT DIET; Regular  CT scan abdomen shows dilated colon with stricturing sigmoid unchanged from June 2022.   General surgery following, recommended serial abdominal examination. General surgery recommended GI for recommendations and possible dilatation. No surgical intervention planned. According to GI, sigmoid stricture likely from peritoneal carcinomatosis. Patient having multiple liquidy bowel movements. GI plan to continue with current bowel regimen. According to GI, colonic dilatation does not last long and would not be recommended. Colonic stent only went complete bowel obstruction. Renal/Fluid/Electrolyte  IV Fluids: No IV fluids today. I/O: In: 5428.6 [P.O.:945; I.V.:2317]  Out: 4050 [Urine:4050]  UOP: 2.5 cc/kg/hr  Monitor electrolytes, replace PRN   ID  WBC:   Lab Results   Component Value Date    WBC 8.8 10/07/2022     Tmax: Temp (24hrs), Av.6 °F (37 °C), Min:98.1 °F (36.7 °C), Max:99.3 °F (37.4 °C)    Antimicrobials: Vancomycin and Zosyn hematology:  Recent Labs     10/05/22  0500 10/06/22  0403 10/07/22  0443   HGB 12.7 12.5 12.3    stable  Endocrine:   glucose controlled - most recent BGL is   Recent Labs     10/05/22  0500 10/06/22  0403 10/07/22  0443   GLUCOSE 106* 113* 126*     DVT Prophylaxis  Eliquis    -Neurology recommended holding repeat MRI for now. Will repeat in the future.  -Will be transferred to neuro stepdown as patient is stable and can be managed there.  -Oncology consulted. Dr. Torsten Vazquez contacted regarding patient's condition. He has been following patient previously. Discharge Needs:  PT, OT, ST, SW, and Case Management      CODE STATUS: Full Code             Darrel Patten MD, M.D.              Department of Internal Medicine/ Critical care  [address] [facility] ([place])             10/7/2022, 8:33 AM

## 2022-10-07 NOTE — PLAN OF CARE
Attending Supervising Physicians Attestation Statement  I have seen and examined Lyly Gautam and the key elements of all parts of the encounter have been performed by me. I agree with the assessment, plan and orders as documented by the Advanced Practice Provider. I discussed the findings and plans with the resident physician and agree as documented in their note . In addition to the pertinent positives and negatives as stated within HPI and the review of systems as documented in the notes, all other systems were reviewed when able to and are reported negative. Additional Comments: Patient's mentation worse than when I had seen her last year. However daughter tells me this is an improvement compared to yesterday. On LTME. Discussed incentive spirometer agreeable to it. Patient recognizes some delusions/hallucinations but is redirectable if daughter discussed. Patient recently lost her son a few weeks back. Gave condolences.  Appreciate neurology and oncology recommendations      Electronically signed by Mat Palma MD on 10/7/2022 at 7:59 PM

## 2022-10-07 NOTE — PROGRESS NOTES
[address] [facility] Pharmacokinetic Monitoring Service - Vancomycin    Consulting Provider: Tania Hernandez   Indication: sepsis  Target Concentration: Goal AUC/TAQUERIA 400-600 mg*hr/L  Day of Therapy: 4  Additional Antimicrobials: zosyn    Pertinent Laboratory Values: Wt Readings from Last 1 Encounters:   10/06/22 153 lb 7 oz (69.6 kg)     Temp Readings from Last 1 Encounters:   10/06/22 98.1 °F (36.7 °C) (Oral)     Estimated Creatinine Clearance: 165 mL/min (A) (based on SCr of 0.36 mg/dL (L)). Recent Labs     10/05/22  0500 10/06/22  0403   CREATININE 0.35* 0.36*   WBC 8.4 4.6     Procalcitonin: n/a    Pertinent Cultures:  Culture Date Source Results   10/3 blood ngd3   MRSA Nasal Swab: negative on 10/3    Recent vancomycin administrations                     vancomycin (VANCOCIN) 1000 mg in sodium chloride 0.9% 250 mL IVPB (mg) 1,000 mg New Bag 10/06/22 1843     1,000 mg New Bag  0950     1,000 mg New Bag  0003     1,000 mg New Bag 10/05/22 1554    vancomycin (VANCOCIN) 750 mg in sodium chloride 0.9 % 250 mL IVPB (mg) 750 mg New Bag 10/05/22 0815     750 mg New Bag 10/04/22 2351     750 mg New Bag  1658     750 mg New Bag  0743     750 mg New Bag  0058                    Assessment:  Date/Time Current Dose Concentration Timing of Concentration (h) AUC   10/6/22 1000mg q8h 10.1 8hr post dose 461   Note: Serum concentrations collected for AUC dosing may appear elevated if collected in close proximity to the dose administered, this is not necessarily an indication of toxicity    Plan:  Current dosing regimen is therapeutic  Continue current dose  Repeat vancomycin concentration will be ordered when clinically appropriate.     Pharmacy will continue to monitor patient and adjust therapy as indicated    Thank you for the consult,  KANDACE Guerrero [facility]  10/6/2022 8:24 PM

## 2022-10-07 NOTE — PROCEDURES
PRN Ananda Krueger MD   2.5 mg at 10/05/22 2118    midazolam PF (VERSED) injection 1 mg  1 mg IntraVENous Q2H PRN Ananda Krueger MD   1 mg at 10/05/22 0515    racepinephrine HCl (VAPONEFPRIN) 2.25 % nebulizer solution NEBU 11.25 mg  11.25 mg Nebulization Q3H PRN Ananda Krueger MD        sodium chloride nebulizer 0.9 % solution 3 mL  3 mL Nebulization Q4H PRN Lori Patterson MD        sodium chloride flush 0.9 % injection 5-40 mL  5-40 mL IntraVENous 2 times per day Thor Kuster, DO   10 mL at 10/06/22 2026    sodium chloride flush 0.9 % injection 5-40 mL  5-40 mL IntraVENous PRN Thor Kuster, DO        0.9 % sodium chloride infusion   IntraVENous PRN Thor Kuster, DO 10 mL/hr at 10/07/22 0040 New Bag at 10/07/22 0040    ondansetron (ZOFRAN-ODT) disintegrating tablet 4 mg  4 mg Oral Q8H PRN Thor Kuster, DO        Or    ondansetron [facility]) injection 4 mg  4 mg IntraVENous Q6H PRN Thor Kuster, DO   4 mg at 10/06/22 2101    polyethylene glycol (GLYCOLAX) packet 17 g  17 g Oral Daily PRN Thor Kuster, DO        acetaminophen (TYLENOL) tablet 650 mg  650 mg Oral Q6H PRN Thor Kuster, DO   650 mg at 10/05/22 0703    Or    acetaminophen (TYLENOL) suppository 650 mg  650 mg Rectal Q6H PRN Thor Kuster, DO        0.9 % sodium chloride infusion   IntraVENous Continuous Thor Kuster,  mL/hr at 10/06/22 2242 New Bag at 10/06/22 2242    potassium chloride 20 mEq/50 mL IVPB (Central Line)  20 mEq IntraVENous PRN Thor Kuster, DO   Stopped at 10/05/22 1550    Or    potassium chloride 10 mEq/100 mL IVPB (Peripheral Line)  10 mEq IntraVENous PRN Thor Kuster, DO   Stopped at 10/06/22 0948    piperacillin-tazobactam (ZOSYN) 3,375 mg in dextrose 5 % 50 mL IVPB extended infusion (mini-bag)  3,375 mg IntraVENous Q8H Charlie Cross DO   Stopped at 10/07/22 0028    vancomycin (VANCOCIN) intermittent dosing (placeholder)   Other RX Placeholder Charlie Cross DO   Given at 10/03/22 1900     Technical Description: This is a 21-channel digital EEG recording with time-locked video. Electrodes were placed in accordance with the 10-20 International System of Electrode Placement. Single lead EKG monitoring was included. Baseline EEG Recording:  A formal baseline EEG recording was not obtained. Day 1 - 10/4/22, starting at 18:32    Interictal EEG Samples: The background activity over the right hemisphere consisted of 3 to 7 Hz of polymorphic theta activity of 20-30 µV. The activity over the left hemisphere was relatively less well-modulated and consisted of 4 to 5 Hz of polymorphic delta and theta slowing. This asymmetry between the 2 hemispheres was continuous. Frequent left central parietal sharp waves were seen, at times occurring in periodic pattern, consistent with lateralized periodic discharges. The EKG channel revealed no abnormalities. Ictal EEG Recording / Patient Events: At 20 1:37 PM, there is a burst of rhythmic 3 to 4 Hz of delta activity maximal over the left hemisphere, which then gradually increased in amplitude, became more rhythmic and involve the right hemisphere, ending at 9:38 PM.    Summary: During the recording, the patient had 1 subclinical seizure lasting about 40 seconds originating from the left hemisphere. The interictal EEG was abnormal due to diffuse polymorphic theta slowing suggesting mild to moderate encephalopathy. Continuous left hemispheric slowing suggested underlying structural defect. Frequent left central parietal sharp waves and lateralized periodic discharges conferred an increased risk for focal onset seizures. Monitoring was continued in order to record the patient's typical events. The EKG channel revealed no abnormalities. Day 2 - 10/5/22    Interictal EEG Samples: Interictal EEG was unchanged from yesterday.     Ictal EEG Recording / Patient Events: During this period the patient had total of 5 seizures at 2:25 AM, 3 AM, 3:27 AM, 4:02 AM, 4:51 AM, 7:38 AM, 8:16 AM, 8:46 AM, 9:05 AM, 9:57 AM, 6:20 PM.  During some of the seizures, the patient noted to have left hand flapping movement and at times having left head version    Summary: During the recording, the patient had 11 electroclinical seizures with left hand flapping and left head deviation. Electrographically, these seizures were similar to yesterday seizures. The interictal EEG was abnormal due to diffuse polymorphic theta slowing suggesting mild to moderate encephalopathy. Continuous left hemispheric slowing suggested underlying structural defect. Frequent left central parietal sharp waves and lateralized periodic discharges (LPDs) conferred an increased risk for focal onset seizures. Monitoring was continued in order to record the patient's typical events. The EKG channel revealed no abnormalities. Day 3 - 10/6/22,    Interictal EEG Samples: Interictal EEG was unchanged from yesterday. Ictal EEG Recording / Patient Events: During this period the patient had subclinical seizures that 4:59 AM, 5:37 AM, 6:10 AM, 8:25 AM, 12:35 PM, 3:20 PM     Summary: During the recording, the patient had 6 left hemispheric onset seizures. Electrographically, these seizures were similar to yesterday seizures. The interictal EEG was abnormal due to diffuse polymorphic theta slowing suggesting mild to moderate encephalopathy. Continuous left hemispheric slowing suggested underlying structural defect. Frequent left central parietal sharp waves and lateralized periodic discharges (LPDs) conferred an increased risk for focal onset seizures. Monitoring was continued in order to record the patient's typical events. The EKG channel revealed no abnormalities. Day 4 - 10/7/22, reviewed through 4:30 am    Interictal EEG Samples: Interictal EEG was unchanged from yesterday. Ictal EEG Recording / Patient Events: During this period the patient had no events or seizures.     Summary: During this day of recording no events were recorded. Continuous left hemispheric slowing suggested underlying structural defect. Occasional left central parietal sharp waves and lateralized periodic discharges (LPDs) conferred an increased risk for focal onset seizures. Monitoring was continued in order to record the patient's typical events. The EKG channel revealed no abnormalities. Morales Cameron MD  Diplomate, American Board of Psychiatry and Neurology  Diplomate, American Board of Clinical Neurophysiology  Diplomate, American Board of Epilepsy       Please note this is a preliminary report and updated daily. The final report will have a summary of behavior and electrographic findings with clinical correlation.

## 2022-10-07 NOTE — PROGRESS NOTES
ICU transfer admitted 4 days ago    CC: AMS  PMH - metastatic ovarian cancer, hx of intra abd bleed 2/2 splenic mass with GI infitleration s/p embolization of sleep, EF 55%, hx of pericardial effusion 02/22    ICU course - GCS 8, intubated for airway protection. Received 2 L fluid in ER. Tx with broad spectrum antibiotics for concern of PNA. 10/6/2022: Neurology to be started Keppra 1 g home dose and increased it to 1 g twice daily. GI recommended no dilatation at this moment. AMS secondary to concerns of lung and peritoneal mets and sepsis, hypertensive urgency vs PRES. Pending brain MRI  Subclinical seizures - management per neurology. 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 GI signed off. Patient having bowel movements  Ovarian carcinoma dx 2005 with multiple reoccurrences - management per hem/onc.  Percocet for pain control  LE lymphedema - continue lasix 20 mg po  Hx of DVT/PE - continue eliquis 5 mg bid  Concern for refeeding syndrome - f/u on electrolytes      DVT prophylaxis - eliquis

## 2022-10-07 NOTE — CARE COORDINATION
Transitional planning. LTME - day 3, MRI Brain w  w/o contrast ordered. Needs PT/OT, pt lives with her ex-  and plans to return home w/ him when discharged. Unsure of HC needs at this time, will continue to monitor.

## 2022-10-08 LAB
ABSOLUTE EOS #: 0.07 K/UL (ref 0–0.44)
ABSOLUTE IMMATURE GRANULOCYTE: 0.03 K/UL (ref 0–0.3)
ABSOLUTE LYMPH #: 0.95 K/UL (ref 1.1–3.7)
ABSOLUTE MONO #: 0.97 K/UL (ref 0.1–1.2)
ANION GAP SERPL CALCULATED.3IONS-SCNC: 13 MMOL/L (ref 9–17)
BASOPHILS # BLD: 0 % (ref 0–2)
BASOPHILS ABSOLUTE: <0.03 K/UL (ref 0–0.2)
BUN BLDV-MCNC: 6 MG/DL (ref 6–20)
CALCIUM SERPL-MCNC: 8.6 MG/DL (ref 8.6–10.4)
CHLORIDE BLD-SCNC: 103 MMOL/L (ref 98–107)
CO2: 21 MMOL/L (ref 20–31)
CREAT SERPL-MCNC: 1.03 MG/DL (ref 0.5–0.9)
CULTURE: NORMAL
CULTURE: NORMAL
EOSINOPHILS RELATIVE PERCENT: 1 % (ref 1–4)
GFR SERPL CREATININE-BSD FRML MDRD: >60 ML/MIN/1.73M2
GLUCOSE BLD-MCNC: 107 MG/DL (ref 70–99)
HCT VFR BLD CALC: 39.6 % (ref 36.3–47.1)
HEMOGLOBIN: 12.6 G/DL (ref 11.9–15.1)
IMMATURE GRANULOCYTES: 0 %
LYMPHOCYTES # BLD: 12 % (ref 24–43)
MAGNESIUM: 2.2 MG/DL (ref 1.6–2.6)
MCH RBC QN AUTO: 29.3 PG (ref 25.2–33.5)
MCHC RBC AUTO-ENTMCNC: 31.8 G/DL (ref 28.4–34.8)
MCV RBC AUTO: 92.1 FL (ref 82.6–102.9)
MONOCYTES # BLD: 13 % (ref 3–12)
NRBC AUTOMATED: 0 PER 100 WBC
PDW BLD-RTO: 16.4 % (ref 11.8–14.4)
PHOSPHORUS: 3.2 MG/DL (ref 2.6–4.5)
PHOSPHORUS: 4 MG/DL (ref 2.6–4.5)
PLATELET # BLD: ABNORMAL K/UL (ref 138–453)
PLATELET, FLUORESCENCE: 108 K/UL (ref 138–453)
PLATELET, IMMATURE FRACTION: 2.8 % (ref 1.1–10.3)
POTASSIUM SERPL-SCNC: 3.5 MMOL/L (ref 3.7–5.3)
RBC # BLD: 4.3 M/UL (ref 3.95–5.11)
RBC # BLD: ABNORMAL 10*6/UL
SEG NEUTROPHILS: 73 % (ref 36–65)
SEGMENTED NEUTROPHILS ABSOLUTE COUNT: 5.64 K/UL (ref 1.5–8.1)
SODIUM BLD-SCNC: 137 MMOL/L (ref 135–144)
SPECIMEN DESCRIPTION: NORMAL
SPECIMEN DESCRIPTION: NORMAL
WBC # BLD: 7.7 K/UL (ref 3.5–11.3)

## 2022-10-08 PROCEDURE — 6370000000 HC RX 637 (ALT 250 FOR IP): Performed by: STUDENT IN AN ORGANIZED HEALTH CARE EDUCATION/TRAINING PROGRAM

## 2022-10-08 PROCEDURE — 6360000002 HC RX W HCPCS: Performed by: STUDENT IN AN ORGANIZED HEALTH CARE EDUCATION/TRAINING PROGRAM

## 2022-10-08 PROCEDURE — 83735 ASSAY OF MAGNESIUM: CPT

## 2022-10-08 PROCEDURE — 6370000000 HC RX 637 (ALT 250 FOR IP): Performed by: NURSE PRACTITIONER

## 2022-10-08 PROCEDURE — 94761 N-INVAS EAR/PLS OXIMETRY MLT: CPT

## 2022-10-08 PROCEDURE — 6370000000 HC RX 637 (ALT 250 FOR IP)

## 2022-10-08 PROCEDURE — 99232 SBSQ HOSP IP/OBS MODERATE 35: CPT | Performed by: INTERNAL MEDICINE

## 2022-10-08 PROCEDURE — 80048 BASIC METABOLIC PNL TOTAL CA: CPT

## 2022-10-08 PROCEDURE — 97110 THERAPEUTIC EXERCISES: CPT

## 2022-10-08 PROCEDURE — 84100 ASSAY OF PHOSPHORUS: CPT

## 2022-10-08 PROCEDURE — APPSS30 APP SPLIT SHARED TIME 16-30 MINUTES: Performed by: NURSE PRACTITIONER

## 2022-10-08 PROCEDURE — 99232 SBSQ HOSP IP/OBS MODERATE 35: CPT | Performed by: STUDENT IN AN ORGANIZED HEALTH CARE EDUCATION/TRAINING PROGRAM

## 2022-10-08 PROCEDURE — 94640 AIRWAY INHALATION TREATMENT: CPT

## 2022-10-08 PROCEDURE — 85025 COMPLETE CBC W/AUTO DIFF WBC: CPT

## 2022-10-08 PROCEDURE — 36415 COLL VENOUS BLD VENIPUNCTURE: CPT

## 2022-10-08 PROCEDURE — 95714 VEEG EA 12-26 HR UNMNTR: CPT

## 2022-10-08 PROCEDURE — 85055 RETICULATED PLATELET ASSAY: CPT

## 2022-10-08 PROCEDURE — 2060000000 HC ICU INTERMEDIATE R&B

## 2022-10-08 PROCEDURE — 2580000003 HC RX 258

## 2022-10-08 PROCEDURE — 95720 EEG PHY/QHP EA INCR W/VEEG: CPT | Performed by: PSYCHIATRY & NEUROLOGY

## 2022-10-08 PROCEDURE — 2580000003 HC RX 258: Performed by: STUDENT IN AN ORGANIZED HEALTH CARE EDUCATION/TRAINING PROGRAM

## 2022-10-08 PROCEDURE — 99232 SBSQ HOSP IP/OBS MODERATE 35: CPT | Performed by: PSYCHIATRY & NEUROLOGY

## 2022-10-08 PROCEDURE — 97116 GAIT TRAINING THERAPY: CPT

## 2022-10-08 RX ORDER — AMLODIPINE BESYLATE 5 MG/1
5 TABLET ORAL DAILY
Status: DISCONTINUED | OUTPATIENT
Start: 2022-10-08 | End: 2022-10-10

## 2022-10-08 RX ORDER — QUETIAPINE FUMARATE 25 MG/1
25 TABLET, FILM COATED ORAL NIGHTLY
Status: DISCONTINUED | OUTPATIENT
Start: 2022-10-08 | End: 2022-10-09

## 2022-10-08 RX ORDER — LORAZEPAM 1 MG/1
1 TABLET ORAL EVERY 4 HOURS PRN
Status: DISCONTINUED | OUTPATIENT
Start: 2022-10-08 | End: 2022-10-17 | Stop reason: HOSPADM

## 2022-10-08 RX ORDER — POTASSIUM CHLORIDE 20 MEQ/1
40 TABLET, EXTENDED RELEASE ORAL ONCE
Status: COMPLETED | OUTPATIENT
Start: 2022-10-08 | End: 2022-10-08

## 2022-10-08 RX ORDER — SODIUM CHLORIDE 9 MG/ML
INJECTION, SOLUTION INTRAVENOUS CONTINUOUS
Status: ACTIVE | OUTPATIENT
Start: 2022-10-08 | End: 2022-10-09

## 2022-10-08 RX ADMIN — APIXABAN 5 MG: 5 TABLET, FILM COATED ORAL at 20:24

## 2022-10-08 RX ADMIN — SODIUM CHLORIDE, PRESERVATIVE FREE 10 ML: 5 INJECTION INTRAVENOUS at 20:24

## 2022-10-08 RX ADMIN — ONDANSETRON 4 MG: 2 INJECTION INTRAMUSCULAR; INTRAVENOUS at 17:59

## 2022-10-08 RX ADMIN — ACETAMINOPHEN 325 MG: 325 TABLET ORAL at 18:00

## 2022-10-08 RX ADMIN — LAMOTRIGINE 125 MG: 100 TABLET ORAL at 20:24

## 2022-10-08 RX ADMIN — Medication 5 MG: at 20:23

## 2022-10-08 RX ADMIN — ALBUTEROL SULFATE 2.5 MG: 5 SOLUTION RESPIRATORY (INHALATION) at 09:41

## 2022-10-08 RX ADMIN — SODIUM CHLORIDE, PRESERVATIVE FREE 10 ML: 5 INJECTION INTRAVENOUS at 08:37

## 2022-10-08 RX ADMIN — LEVETIRACETAM 1000 MG: 500 TABLET, FILM COATED ORAL at 08:36

## 2022-10-08 RX ADMIN — QUETIAPINE FUMARATE 25 MG: 25 TABLET ORAL at 20:24

## 2022-10-08 RX ADMIN — FAMOTIDINE 20 MG: 20 TABLET, FILM COATED ORAL at 20:24

## 2022-10-08 RX ADMIN — LACOSAMIDE 100 MG: 100 TABLET, FILM COATED ORAL at 20:24

## 2022-10-08 RX ADMIN — FAMOTIDINE 20 MG: 20 TABLET, FILM COATED ORAL at 08:36

## 2022-10-08 RX ADMIN — SODIUM CHLORIDE: 9 INJECTION, SOLUTION INTRAVENOUS at 09:40

## 2022-10-08 RX ADMIN — LEVOFLOXACIN 500 MG: 500 TABLET, FILM COATED ORAL at 08:36

## 2022-10-08 RX ADMIN — FUROSEMIDE 20 MG: 20 TABLET ORAL at 08:36

## 2022-10-08 RX ADMIN — APIXABAN 5 MG: 5 TABLET, FILM COATED ORAL at 08:37

## 2022-10-08 RX ADMIN — SODIUM CHLORIDE: 9 INJECTION, SOLUTION INTRAVENOUS at 18:26

## 2022-10-08 RX ADMIN — AMLODIPINE BESYLATE 5 MG: 5 TABLET ORAL at 09:38

## 2022-10-08 RX ADMIN — LAMOTRIGINE 125 MG: 100 TABLET ORAL at 08:36

## 2022-10-08 RX ADMIN — OXYCODONE HYDROCHLORIDE AND ACETAMINOPHEN 1 TABLET: 5; 325 TABLET ORAL at 17:55

## 2022-10-08 RX ADMIN — OXYCODONE HYDROCHLORIDE AND ACETAMINOPHEN 1 TABLET: 5; 325 TABLET ORAL at 18:00

## 2022-10-08 RX ADMIN — DOCUSATE SODIUM LIQUID 100 MG: 100 LIQUID ORAL at 08:37

## 2022-10-08 RX ADMIN — POTASSIUM CHLORIDE 40 MEQ: 1500 TABLET, EXTENDED RELEASE ORAL at 09:52

## 2022-10-08 RX ADMIN — LORAZEPAM 1 MG: 1 TABLET ORAL at 12:38

## 2022-10-08 RX ADMIN — SERTRALINE 100 MG: 50 TABLET, FILM COATED ORAL at 08:36

## 2022-10-08 RX ADMIN — LEVETIRACETAM 1000 MG: 500 TABLET, FILM COATED ORAL at 20:23

## 2022-10-08 RX ADMIN — LACOSAMIDE 100 MG: 100 TABLET, FILM COATED ORAL at 08:36

## 2022-10-08 ASSESSMENT — PAIN SCALES - GENERAL: PAINLEVEL_OUTOF10: 0

## 2022-10-08 NOTE — PROCEDURES
LONG-TERM EEG-VIDEO [id number] [address]    Patient: Kay Velázquez [facility]  Age: 59 y.o. MRN: [id number]    Referring Physician: No ref. provider found  History: The patient is a 59 y.o. female who presented breakthrough seizure/encephalopathy. This long-term video-EEG monitoring study was performed to determine the nature of the patient's clinical events. The patient is on neuroactive medications.    Kay Velázquez [place]   Current Facility-Administered Medications   Medication Dose Route Frequency Provider Last Rate Last Admin    LORazepam (ATIVAN) tablet 1 mg  1 mg Oral Q4H PRN Mayte Rivera MD        amLODIPine (NORVASC) tablet 5 mg  5 mg Oral Daily Elsa Antony MD        potassium chloride (KLOR-CON M) extended release tablet 40 mEq  40 mEq Oral Once Elsa Antony MD        levoFLOXacin (LEVAQUIN) tablet 500 mg  500 mg Oral Daily Fabienne Guerra MD   500 mg at 10/08/22 0836    lacosamide (VIMPAT) tablet 100 mg  100 mg Oral BID Tah Magsi, APRN - CNP   100 mg at 10/08/22 0836    levETIRAcetam (KEPPRA) tablet 1,000 mg  1,000 mg Oral BID Oddis Folk, DO   1,000 mg at 10/08/22 0836    famotidine (PEPCID) tablet 20 mg  20 mg Oral BID Samia Orn, DO   20 mg at 10/08/22 0836    docusate (COLACE) 50 MG/5ML liquid 100 mg  100 mg Oral Daily Fabienne Guerra MD   100 mg at 10/08/22 0837    apixaban (ELIQUIS) tablet 5 mg  5 mg Oral BID Samia Orn, DO   5 mg at 10/08/22 9265    furosemide (LASIX) tablet 20 mg  20 mg Oral Daily Samia Orn, DO   20 mg at 10/08/22 0836    lamoTRIgine (LAMICTAL) tablet 125 mg  125 mg Oral BID Samia Orn, DO   125 mg at 10/08/22 0836    oxyCODONE-acetaminophen (PERCOCET) 5-325 MG per tablet 1 tablet  1 tablet Oral Q4H PRN Samia Orn, DO        sertraline (ZOLOFT) tablet 100 mg  100 mg Oral Daily Samia Orn, DO   100 mg at 10/08/22 0836    melatonin tablet 5 mg  5 mg Oral Nightly Diane Harries, DO   5 mg at 10/07/22 2153    morphine (PF) injection 2 mg  2 mg IntraVENous Q4H PRN Diane Harries, DO   2 mg at 10/05/22 0228    albuterol (PROVENTIL) nebulizer solution 2.5 mg  2.5 mg Nebulization Q6H PRN Michelle Painter MD   2.5 mg at 10/05/22 2118    racepinephrine HCl (VAPONEFPRIN) 2.25 % nebulizer solution NEBU 11.25 mg  11.25 mg Nebulization Q3H PRN Soheila Patterson MD        sodium chloride nebulizer 0.9 % solution 3 mL  3 mL Nebulization Q4H PRN Kasey Patterson MD        sodium chloride flush 0.9 % injection 5-40 mL  5-40 mL IntraVENous 2 times per day Diane Harries, DO   10 mL at 10/08/22 0837    sodium chloride flush 0.9 % injection 5-40 mL  5-40 mL IntraVENous PRN Diane Harries, DO        0.9 % sodium chloride infusion   IntraVENous PRN Diane Harries, DO   Stopped at 10/07/22 0503    ondansetron (ZOFRAN-ODT) disintegrating tablet 4 mg  4 mg Oral Q8H PRN Diane Harries, DO        Or    ondansetron [facility]) injection 4 mg  4 mg IntraVENous Q6H PRN Diane Harries, DO   4 mg at 10/06/22 2101    polyethylene glycol (GLYCOLAX) packet 17 g  17 g Oral Daily PRN Diane Harries, DO        acetaminophen (TYLENOL) tablet 650 mg  650 mg Oral Q6H PRN Diane Harries, DO   650 mg at 10/07/22 1156    Or    acetaminophen (TYLENOL) suppository 650 mg  650 mg Rectal Q6H PRN Diane Harries, DO        potassium chloride 20 mEq/50 mL IVPB (Central Line)  20 mEq IntraVENous PRN Diane Harries, DO   Stopped at 10/05/22 1550    Or    potassium chloride 10 mEq/100 mL IVPB (Peripheral Line)  10 mEq IntraVENous PRN Diane Harries,  mL/hr at 10/07/22 1159 10 mEq at 10/07/22 1159     Technical Description: This is a 21-channel digital EEG recording with time-locked video. Electrodes were placed in accordance with the 10-20 International System of Electrode Placement. Single lead EKG monitoring was included. Baseline EEG Recording:  A formal baseline EEG recording was not obtained.      Day 1 - 10/4/22, starting at 18:32    Interictal EEG Samples: The background activity over the right hemisphere consisted of 3 to 7 Hz of polymorphic theta activity of 20-30 µV. The activity over the left hemisphere was relatively less well-modulated and consisted of 4 to 5 Hz of polymorphic delta and theta slowing. This asymmetry between the 2 hemispheres was continuous. Frequent left central parietal sharp waves were seen, at times occurring in periodic pattern, consistent with lateralized periodic discharges. The EKG channel revealed no abnormalities. Ictal EEG Recording / Patient Events: At 20 1:37 PM, there is a burst of rhythmic 3 to 4 Hz of delta activity maximal over the left hemisphere, which then gradually increased in amplitude, became more rhythmic and involve the right hemisphere, ending at 9:38 PM.    Summary: During the recording, the patient had 1 subclinical seizure lasting about 40 seconds originating from the left hemisphere. The interictal EEG was abnormal due to diffuse polymorphic theta slowing suggesting mild to moderate encephalopathy. Continuous left hemispheric slowing suggested underlying structural defect. Frequent left central parietal sharp waves and lateralized periodic discharges conferred an increased risk for focal onset seizures. Monitoring was continued in order to record the patient's typical events. The EKG channel revealed no abnormalities. Day 2 - 10/5/22    Interictal EEG Samples: Interictal EEG was unchanged from yesterday. Ictal EEG Recording / Patient Events: During this period the patient had total of 5 seizures at 2:25 AM, 3 AM, 3:27 AM, 4:02 AM, 4:51 AM, 7:38 AM, 8:16 AM, 8:46 AM, 9:05 AM, 9:57 AM, 6:20 PM.  During some of the seizures, the patient noted to have left hand flapping movement and at times having left head version    Summary: During the recording, the patient had 11 electroclinical seizures with left hand flapping and left head deviation. Electrographically, these seizures were similar to yesterday seizures. The interictal EEG was abnormal due to diffuse polymorphic theta slowing suggesting mild to moderate encephalopathy. Continuous left hemispheric slowing suggested underlying structural defect. Frequent left central parietal sharp waves and lateralized periodic discharges (LPDs) conferred an increased risk for focal onset seizures. Monitoring was continued in order to record the patient's typical events. The EKG channel revealed no abnormalities. Day 3 - 10/6/22,    Interictal EEG Samples: Interictal EEG was unchanged from yesterday. Ictal EEG Recording / Patient Events: During this period the patient had subclinical seizures that 4:59 AM, 5:37 AM, 6:10 AM, 8:25 AM, 12:35 PM, 3:20 PM     Summary: During the recording, the patient had 6 left hemispheric onset seizures. Electrographically, these seizures were similar to yesterday seizures. The interictal EEG was abnormal due to diffuse polymorphic theta slowing suggesting mild to moderate encephalopathy. Continuous left hemispheric slowing suggested underlying structural defect. Frequent left central parietal sharp waves and lateralized periodic discharges (LPDs) conferred an increased risk for focal onset seizures. Monitoring was continued in order to record the patient's typical events. The EKG channel revealed no abnormalities. Day 4 - 10/7/22    Interictal EEG Samples: Interictal EEG was unchanged from yesterday. Ictal EEG Recording / Patient Events: During this period the patient had no events or seizures. Summary: During this day of recording no events were recorded. Continuous left hemispheric slowing suggested underlying structural defect. Occasional left central parietal sharp waves and lateralized periodic discharges (LPDs) conferred an increased risk for focal onset seizures. Monitoring was continued in order to record the patient's typical events.  The EKG channel revealed no abnormalities. Day 5 - 10/8/22, reviewed through 7:30 am    Interictal EEG Samples: Interictal EEG was unchanged from yesterday. Ictal EEG Recording / Patient Events: During this period the patient had no events or seizures. Summary: During this day of recording no events were recorded. Continuous left hemispheric slowing suggested underlying structural defect. Occasional left central parietal sharp waves and lateralized periodic discharges (LPDs) conferred an increased risk for focal onset seizures. Monitoring was continued in order to record the patient's typical events. The EKG channel revealed no abnormalities. Aleida Caldera MD  Diplomate, American Board of Psychiatry and Neurology  Diplomate, American Board of Clinical Neurophysiology  Diplomate, American Board of Epilepsy       Please note this is a preliminary report and updated daily. The final report will have a summary of behavior and electrographic findings with clinical correlation.

## 2022-10-08 NOTE — PROGRESS NOTES
[address] [place] [address]     Department of Internal Medicine - Staff Internal Medicine Service   ICU PATIENT TRANSFER NOTE        Patient:  Josep Callejas [facility]  YOB: 1965  MRN: [id number]     Acct: [de-identified]     Admit date: 10/3/2022    Code Status:-  Full code     Reason for ICU Admission:-       SUPPORT DEVICES: [] Ventilator [] BIPAP  [x] Nasal Cannula [] Room Air    Consultations:- [] Cardiology [] Nephrology  [x] Hemo onco  [] GI                               [] ID [] ENT  [] Rheum [] Endo   []Physiotherapy                                 Others:-     NUTRITION:  [] NPO [] Tube Feeding (Specify: ) [] TPN  [x] PO    Central Lines:- [x] No   [] Yes           If yes - Days/Date of Insertion. Pt seen,examined and Chart reviewed. ICU COURSE:    38-year-old female presented with chief complaints of altered mental status and ED. Was found drowsy by daughter on 10/2/2022. She was brought in by EMS with altered mental status opening eyes to painful stimuli and low GCS. In the ER she was meeting SIRS criteria and decision was made to intubate the patient for airway protection. Before intubation Narcan was also tried as there was a suspicion for acute toxic metabolic encephalopathy due to prescription opioid use. She was transferred to ICU for further management. In the ICU initially after intubation the patient was combative and not intolerant of the ET tube so she was sedated with propofol. Also started on IV fluids for ongoing hypotension. She has known history of seizures and was on antiseizure medications at home. CT scan chest showed bilateral pulmonary infiltrates concerning for aspiration pneumonia and she was started on broad-spectrum antibiotics. Neurology was also consulted for seizure work-up. 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   She has hx of intra-abdominal bleeding secondary to splenic mass with GI infiltration s/p embolization of the spleen. 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 They recommend stent placement if patient goes into complete lower bowel obstruction. During the ICU stay, the patient was on LTME which showed 6 subclinical seizures over 24 hours with left hand flapping and left head deviation during for electroclinical seizures. The patient was started with home dose of Keppra with loading dose 2 g and 1 mg Ativan for seizures and maintenance dose of Keppra increased to 1 g twice daily. Currently patient transferred out of ICU and is off pressor supports. She remains hemodynamically stable. Neurology has ordered repeat MRI for concern of possible press versus intracranial mets. We will follow-up on the MRI and neurology recommendations. In the meantime hematology oncology would monitor labs and do outpatient follow-up. Physical Exam:   Vitals: /73   Pulse 89   Temp 98.3 °F (36.8 °C) (Oral)   Resp 23   Ht 5' 5\" (1.651 m)   Wt 151 lb 3.8 oz (68.6 kg)   SpO2 96%   BMI 25.20 kg/m²   24 hour intake/output:  Intake/Output Summary (Last 24 hours) at 10/7/2022 2341  Last data filed at 10/7/2022 2101  Gross per 24 hour   Intake 2408.8 ml   Output --   Net 2408.8 ml     Last 3 weights:   Wt Readings from Last 3 Encounters:   10/07/22 151 lb 3.8 oz (68.6 kg)   10/05/22 153 lb (69.4 kg)   10/04/22 153 lb (69.4 kg)       General appearance - alert, in no distress  Mental status - alert, oriented to person, place, and time  Eyes - pupils equal and reactive, extraocular eye movements intact  Mouth - mucous membranes moist, pharynx normal without lesions  Neck - supple, no significant adenopathy  Chest - clear to auscultation, no wheezes  Heart - normal rate, regular rhythm, normal S1, S2  Abdomen - soft, nontender, nondistended  Neurological - alert, oriented, normal speech, no focal deficits   Extremities - peripheral pulses normal, no pedal edema  Skin - normal coloration and turgor, no rashes      Medications:Current Inpatient  Scheduled Meds:   [START ON 10/8/2022] levoFLOXacin  500 mg Oral Daily    lacosamide  100 mg Oral BID    levETIRAcetam  1,000 mg Oral BID    famotidine  20 mg Oral BID    docusate  100 mg Oral Daily    apixaban  5 mg Oral BID    furosemide  20 mg Oral Daily    lamoTRIgine  125 mg Oral BID    sertraline  100 mg Oral Daily    melatonin  5 mg Oral Nightly    sodium chloride flush  5-40 mL IntraVENous 2 times per day     Continuous Infusions:   sodium chloride Stopped (10/07/22 0503)     PRN Meds:oxyCODONE-acetaminophen, morphine, albuterol, midazolam, racepinephrine HCl, sodium chloride nebulizer, sodium chloride flush, sodium chloride, ondansetron **OR** ondansetron, polyethylene glycol, acetaminophen **OR** acetaminophen, potassium chloride **OR** potassium chloride    Objective:    CBC:   Recent Labs     10/05/22  0500 10/06/22  0403 10/07/22  0443   WBC 8.4 4.6 8.8   HGB 12.7 12.5 12.3    See Reflexed IPF Result See Reflexed IPF Result     BMP:    Recent Labs     10/05/22  0500 10/05/22  1658 10/06/22  0403 10/06/22  1208 10/07/22  0443 10/07/22  1429    139 140  --  145*  --    K 2.6* 3.1* 3.5* 3.6* 3.0* 3.9    102 104  --  108*  --    CO2 26 26 27  --  24  --    BUN 3*  --  3*  --  3*  --    CREATININE 0.35*  --  0.36*  --  0.64  --    GLUCOSE 106*  --  113*  --  126*  --      Calcium:  Recent Labs     10/07/22  0443   CALCIUM 8.7     Ionized Calcium:No results for input(s): IONCA in the last 72 hours. Magnesium:  Recent Labs     10/07/22  0443   MG 1.5*     Phosphorus:No results for input(s): PHOS in the last 72 hours. BNP:No results for input(s): BNP in the last 72 hours. Glucose:No results for input(s): POCGLU in the last 72 hours. HgbA1C: No results for input(s): LABA1C in the last 72 hours.   INR: No results for input(s): INR in the last 72 hours. Hepatic: No results for input(s): ALKPHOS, ALT, AST, PROT, BILITOT, BILIDIR, LABALBU in the last 72 hours. Amylase and Lipase:No results for input(s): LACTA, AMYLASE in the last 72 hours. Lactic Acid: No results for input(s): LACTA in the last 72 hours. CARDIAC ENZYMES:No results for input(s): CKTOTAL, CKMB, CKMBINDEX, TROPONINI in the last 72 hours. BNP: No results for input(s): BNP in the last 72 hours. Lipids: No results for input(s): CHOL, TRIG, HDL, LDL, LDLCALC in the last 72 hours. ABGs: No results found for: PH, PCO2, PO2, HCO3, O2SAT  Thyroid:   Lab Results   Component Value Date/Time    TSH 2.93 03/09/2021 05:43 PM        Urinalysis: No results for input(s): BACTERIA, BLOODU, CLARITYU, COLORU, PHUR, PROTEINU, RBCUA, SPECGRAV, BILIRUBINUR, NITRU, WBCUA, LEUKOCYTESUR, GLUCOSEU in the last 72 hours. Assessment:  Principal Problem:    Septicemia ([address]  Active Problems:    Malignant neoplasm of ovary (HCC)    Seizure ([address]    Type 2 diabetes mellitus without complication, without long-term current use of insulin ([address]    Acute encephalopathy    Seizure disorder, nonconvulsive, with status epilepticus ([address]    Cancer, metastatic to bone ([address]    Chronic deep vein thrombosis (DVT) of femoral vein of left lower extremity (HCC)    Iron deficiency anemia secondary to inadequate dietary iron intake    AMS (altered mental status)    Gastroesophageal reflux disease    Metabolic encephalopathy    Breakthrough seizure ([address]  Resolved Problems:    * No resolved hospital problems. *          Plan:  Acute metabolic encephalopathy  - Possibly secondary to opioid use versus possible seizure-like activity  - Neurology consulted, continued on LTME, suggests underlying structural defect and increased risk for focal onset seizures.     - Currently encephalopathy resolved    Subclinical seizures  - Neurology consulted recommends Keppra 1 g twice daily, Versed 100 mg twice daily and Lamictal 125 mg twice daily.  - MRI brain shows no acute infarct or hemorrhage and stable small foci of right occipital encephalomalacia. - Possible brain metastasis, await further recommendations from neurology      Aspiration pneumonia  - Possibly secondary to altered mentation and acute metabolic encephalopathy  - Continue Levaquin  - Supplemental oxygen therapy as needed. - Continue nebulizer treatments and incentive spirometry    Hx of ovarian carcinoma with metastasis  -Diagnosed 2005 with intraperitoneal carcinomatosis s/p surgical debulking and systemic chemotherapy. - Relapse in last relapse in 2014, lost to follow-up since  - Metastatic relapse again, biopsy confirmed ovarian cancer recurrence with intra-abdominal and splenic involvement as well as lung metastasis  - Currently on active chemotherapy last treatment on 09/29/2022, hematology/oncology continues to follow the patient.  - GI recommended colonic stent if patient goes incomplete large bowel obstruction. Lower extremity lymphedema  - Continue Lasix 20 mg daily    History of DVT/PE  - Continue anticoagulation with Eliquis 5 mg twice daily    - Consult PT, OT and case management. Celeste Francois MD             Resident Internal Medicine, PGY-1  [facility].            10/7/2022, 11:41 PM

## 2022-10-08 NOTE — PROGRESS NOTES
Physical Therapy  Facility/Department: [facility] 1C STEPDOWN  Daily treatment note    Name: Juany Carr  : 1965  MRN: [id number]  Date of Service: 10/8/2022    Discharge Recommendations:  Patient would benefit from continued therapy after discharge   PT Equipment Recommendations  Equipment Needed: Yes  Mobility Devices: Maria Teresa Bolds: Rolling      Patient Diagnosis(es): The primary encounter diagnosis was Septicemia ([facility] [address] A diagnosis of Altered mental status, unspecified altered mental status type was also pertinent to this visit. Past Medical History:  has a past medical history of Anemia, Bleeding, Cervical cancer ([facility] [address] Depression, Diabetes mellitus ([facility] [address] GERD (gastroesophageal reflux disease), Hx of blood clots, Hypertension, Metastatic cancer ([facility] [address] Ovarian cancer ([facility] [address] Post chemo evaluation, PRES (posterior reversible encephalopathy syndrome), and Splenic lesion. Past Surgical History:  has a past surgical history that includes Hysterectomy, total abdominal; Port Surgery; Tonsillectomy; IR PORT PLACEMENT > 5 YEARS (2020); Anus surgery; Abscess Drainage (); colectomy (2013); IR EMBOLIZATION HEMORRHAGE (10/05/2020); and Cardiac catheterization. Assessment   Body Structures, Functions, Activity Limitations Requiring Skilled Therapeutic Intervention: Decreased functional mobility ; Decreased strength;Decreased cognition;Decreased safe awareness;Decreased endurance;Decreased balance  Assessment: Pt amb 10ft x2 with MIN A no Ad,Pt is very impulsive and unsafeamb without skilled assist. Limited by impulsiveness and poor safety awareness. Pt has no insight to deficits making her a high fall risk. Recomending continued therapy to address deficits. Pt will require 24hrs supervision and assistance with mobility upon discahrge.   Therapy Prognosis: Good  Requires PT Follow-Up: Yes  Activity Tolerance  Activity Tolerance: Treatment limited secondary to decreased cognition     Plan   Physcial Therapy Plan  General Plan:  (5-6x/wk)  Current Treatment Recommendations: Strengthening, Balance training, Functional mobility training, Transfer training, Patient/Caregiver education & training, Safety education & training, Home exercise program, Equipment evaluation, education, & procurement, Therapeutic activities, Endurance training, Gait training, Stair training  Safety Devices  Type of Devices: Gait belt, Left in bed, Call light within reach, Bed alarm in place, Patient at risk for falls, Nurse notified, Sitter present  Restraints  Restraints Initially in Place: No  Restraints: 4 bed rails d/t seizure precautions     Restrictions  Restrictions/Precautions  Restrictions/Precautions: General Precautions  Required Braces or Orthoses?: No  Position Activity Restriction  Other position/activity restrictions: LTME, extubated 10/4     Subjective   General  Patient assessed for rehabilitation services?: Yes  Response To Previous Treatment: Patient with no complaints from previous session.   Family / Caregiver Present: No (Sitter at bed)  Follows Commands: Impaired  Subjective  Subjective: Pt supine in bed and agreeable to therapy, Pt denies pain at rest. on LTME        Cognition   Orientation  Overall Orientation Status: Impaired  Orientation Level: Oriented to time;Oriented to place  Cognition  Overall Cognitive Status: Exceptions  Attention Span: Attends with cues to redirect  Safety Judgement: Decreased awareness of need for assistance;Decreased awareness of need for safety  Problem Solving: Decreased awareness of errors;Assistance required to identify errors made;Assistance required to generate solutions  Insights: Not aware of deficits  Initiation: Requires cues for all  Sequencing: Requires cues for some     Objective     Bed mobility  Supine to Sit: Contact guard assistance  Sit to Supine: Contact guard assistance  Scooting: Contact guard assistance  Bed Mobility Comments: Pt very impulsive requiring constant cueing for safety and redirection  Transfers  Sit to Stand: Contact guard assistance  Stand to Sit: Contact guard assistance  Ambulation  Surface: Level tile  Device: Hand-Held Assist  Assistance: Minimal assistance  Gait Deviations: Slow Cynthia;Decreased step length  Distance: 10ft x2  Comments: limited by lines and pt being very impulsive. unsafe to amb without asssist .  More Ambulation?: No  Stairs/Curb  Stairs?: No     Balance  Posture: Fair  Sitting - Static: Fair;+  Standing - Static: Fair;-  Standing - Dynamic: Fair;-  Comments: Standing with HHA x5 mins . Exercise Treatment: Seated LE exercise program: Long Arc Quads, hip abduction/adduction, heel/toe raises, and marches. Reps:x20,   Marching x20 standing  HHA for balance. AM-PAC Score  AM-PAC Inpatient Mobility Raw Score : 18 (10/08/22 1217)  AM-PAC Inpatient T-Scale Score : 43.63 (10/08/22 1217)  Mobility Inpatient CMS 0-100% Score: 46.58 (10/08/22 1217)  Mobility Inpatient CMS G-Code Modifier : CK (10/08/22 1217)     Goals  Short Term Goals  Time Frame for Short Term Goals: 14 visits  Short Term Goal 1: Pt will perform sit<>stand transfer with SBA. Short Term Goal 2: Pt will ambulate 125 feet with least restrictive device and SBA to increase functional independence. Short Term Goal 3: Pt will demonstrate fair+ standing balance to decrease fall risk. Short Term Goal 4: Pt will tolerate a 35 minute therapy session to promote increased endurance. Education  Patient Education  Education Given To: Patient  Education Provided: Role of Therapy;Transfer Training;Equipment;Plan of Care; Fall Prevention Strategies  Education Method: Verbal  Barriers to Learning: Cognition  Education Outcome: Continued education needed      Therapy Time   Individual Concurrent Group Co-treatment   Time In 4541         Time Out 1058         Minutes 26         Timed Code Treatment Minutes: [address] [facility]

## 2022-10-08 NOTE — PROGRESS NOTES
[facility]  Internal Medicine Teaching Residency Program  Inpatient Daily Progress Note  ______________________________________________________________________________    Patient: Carey Hines [facility]  YOB: 1965   [id number]    Acct: [de-identified]     Room: [facility]/0140-01  Admit date: 10/3/2022  Today's date: 10/08/22  Number of days in the hospital: 5    SUBJECTIVE   Admitting Diagnosis: Septicemia ([address]  CC: Altered mental status  Pt examined at bedside. Chart & results reviewed. Patient had an episode of auditory hallucinations at night. Sitter reoriented the patient and she went back to sleep without requiring any medications. This morning she is awake and alert and oriented x3. She remains hypertensive in 150s but maintaining saturation on room air. We will resume Norvasc for hypertension. She continues to be on LTM E for subclinical seizures. Neurology following up and continues her on antiseizure medications. She is waiting for MRI brain ordered by neurology for possible PRES versus brain metastasis. She is also continued on Levaquin for aspiration pneumonia. ROS:  Constitutional:  negative for chills, fevers, sweats  Respiratory:  negative for cough, dyspnea on exertion, hemoptysis, shortness of breath, wheezing  Cardiovascular:  negative for chest pain, chest pressure/discomfort, lower extremity edema, palpitations  Gastrointestinal:  negative for abdominal pain, constipation, diarrhea, nausea, vomiting  Neurological:  negative for dizziness, headache  BRIEF HISTORY     49-year-old female presented with chief complaints of altered mental status and ED. Was found drowsy by daughter on 10/2/2022. She was brought in by EMS with altered mental status opening eyes to painful stimuli and low GCS. In the ER she was meeting SIRS criteria and decision was made to intubate the patient for airway protection.   Before intubation Narcan was also tried as there was a suspicion for acute toxic metabolic encephalopathy due to prescription opioid use. She was transferred to ICU for further management. In the ICU initially after intubation the patient was combative and not intolerant of the ET tube so she was sedated with propofol. Also started on IV fluids for ongoing hypotension. She has known history of seizures and was on antiseizure medications at home. CT scan chest showed bilateral pulmonary infiltrates concerning for aspiration pneumonia and she was started on broad-spectrum antibiotics. Neurology was also consulted for seizure work-up. 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 She has hx of intra-abdominal bleeding secondary to splenic mass with GI infiltration s/p embolization of the spleen. 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 They recommend stent placement if patient goes into complete lower bowel obstruction. During the ICU stay, the patient was on LTME which showed 6 subclinical seizures over 24 hours with left hand flapping and left head deviation during for electroclinical seizures. The patient was started with home dose of Keppra with loading dose 2 g and 1 mg Ativan for seizures and maintenance dose of Keppra increased to 1 g twice daily. Transferred out of ICU. Ongoing neurology work-up for possible press versus intracranial mets.     OBJECTIVE     Vital Signs:  BP (!) 158/81   Pulse 94   Temp 98.1 °F (36.7 °C) (Oral)   Resp 18   Ht 5' 5\" (1.651 m)   Wt 151 lb 3.8 oz (68.6 kg)   SpO2 93%   BMI 25.20 kg/m²     Temp (24hrs), Av.4 °F (36.9 °C), Min:98.1 °F (36.7 °C), Max:99 °F (37.2 °C)    In: 10   Out: 650 [Urine:650]    Physical Exam:  Constitutional: This is a well developed, well nourished,  59y.o. year old female who is alert, oriented, cooperative and in no apparent distress. Head:normocephalic and atraumatic. EENT:  PERRLA. No conjunctival injections. Septum was midline, mucosa was without erythema, exudates or cobblestoning. No thrush was noted. Neck: Supple without thyromegaly. No elevated JVP. Trachea was midline. Respiratory: Chest was symmetrical without dullness to percussion. Breath sounds bilaterally were clear to auscultation. There were no wheezes, rhonchi or rales. There is no intercostal retraction or use of accessory muscles. No egophony noted. Cardiovascular: Regular without murmur, clicks, gallops or rubs. Abdomen: Slightly rounded and soft without organomegaly. No rebound, rigidity or guarding was appreciated. Lymphatic: No lymphadenopathy. Musculoskeletal: Normal curvature of the spine. No gross muscle weakness. Extremities:  No lower extremity edema, ulcerations, tenderness, varicosities or erythema. Muscle size, tone and strength are normal.  No involuntary movements are noted. Skin:  Warm and dry. Good color, turgor and pigmentation. No lesions or scars.   No cyanosis or clubbing  Neurological/Psychiatric: The patient's general behavior, level of consciousness, thought content and emotional status is normal.        Medications:  Scheduled Medications:    levoFLOXacin  500 mg Oral Daily    lacosamide  100 mg Oral BID    levETIRAcetam  1,000 mg Oral BID    famotidine  20 mg Oral BID    docusate  100 mg Oral Daily    apixaban  5 mg Oral BID    furosemide  20 mg Oral Daily    lamoTRIgine  125 mg Oral BID    sertraline  100 mg Oral Daily    melatonin  5 mg Oral Nightly    sodium chloride flush  5-40 mL IntraVENous 2 times per day     Continuous Infusions:    sodium chloride Stopped (10/07/22 0503)     PRN MedicationsLORazepam, 1 mg, Q4H PRN  oxyCODONE-acetaminophen, 1 tablet, Q4H PRN  morphine, 2 mg, Q4H PRN  albuterol, 2.5 mg, Q6H PRN  racepinephrine HCl, 11.25 mg, Q3H PRN  sodium chloride nebulizer, 3 mL, Q4H PRN  sodium chloride flush, 5-40 mL, PRN  sodium chloride, , PRN  ondansetron, 4 mg, Q8H PRN   Or  ondansetron, 4 mg, Q6H PRN  polyethylene glycol, 17 g, Daily PRN  acetaminophen, 650 mg, Q6H PRN   Or  acetaminophen, 650 mg, Q6H PRN  potassium chloride, 20 mEq, PRN   Or  potassium chloride, 10 mEq, PRN        Diagnostic Labs:  CBC:   Recent Labs     10/06/22  0403 10/07/22  0443 10/08/22  0412   WBC 4.6 8.8 7.7   RBC 4.07 4.20 4.30   HGB 12.5 12.3 12.6   HCT 37.7 39.0 39.6   MCV 92.6 92.9 92.1   RDW 16.3* 16.2* 16.4*   PLT See Reflexed IPF Result See Reflexed IPF Result See Reflexed IPF Result     BMP:   Recent Labs     10/06/22  0403 10/06/22  1208 10/07/22  0443 10/07/22  1429 10/08/22  0412     --  145*  --  137   K 3.5*   < > 3.0* 3.9 3.5*     --  108*  --  103   CO2 27  --  24  --  21   PHOS  --   --   --   --  4.0   BUN 3*  --  3*  --  6   CREATININE 0.36*  --  0.64  --  1.03*    < > = values in this interval not displayed. BNP: No results for input(s): BNP in the last 72 hours. PT/INR: No results for input(s): PROTIME, INR in the last 72 hours. APTT: No results for input(s): APTT in the last 72 hours. CARDIAC ENZYMES: No results for input(s): CKMB, CKMBINDEX, TROPONINI in the last 72 hours. Invalid input(s): CKTOTAL;3  FASTING LIPID PANEL:  Lab Results   Component Value Date    CHOL 131 03/10/2021    HDL 33 (L) 03/10/2021    TRIG 147 03/10/2021     LIVER PROFILE: No results for input(s): AST, ALT, ALB, BILIDIR, BILITOT, ALKPHOS in the last 72 hours. MICROBIOLOGY:   Lab Results   Component Value Date/Time    CULTURE NO GROWTH 4 DAYS 10/03/2022 02:44 PM       Imaging:    CT HEAD WO CONTRAST    Result Date: 10/3/2022  1. No acute intracranial abnormality. 2. No evidence for significant stenosis or occlusion. 3. Consolidation in the visualized lungs bilaterally that is worse on the right compared to the left.   Correlate with report from dedicated CT chest, abdomen, and pelvis for further discussion. XR CHEST PORTABLE    Result Date: 10/5/2022  Interval extubation. Interstitial opacities have increased in the interval, for which developing edema should be considered. XR CHEST PORTABLE    Result Date: 10/3/2022  1. Endotracheal tube tip lies 5 cm above the alejandro. 2. Enteric tube in the stomach with the tip at the level of the proximal to mid gastric body. 3. Stable right mid I a frame elevation resulting in right hemithorax volume loss. 4. There is progressive curvilinear right lung base opacity which may represent progressive atelectasis. 5. Stable perihilar interstitial reticulonodular densities. No acute consolidation or pulmonary edema. CTA HEAD NECK W CONTRAST    Result Date: 10/3/2022  1. No acute intracranial abnormality. 2. No evidence for significant stenosis or occlusion. 3. Consolidation in the visualized lungs bilaterally that is worse on the right compared to the left. Correlate with report from dedicated CT chest, abdomen, and pelvis for further discussion. CT CHEST ABDOMEN PELVIS W CONTRAST    Result Date: 10/3/2022  1. Progressive bibasilar and left lower lobe consolidative change which may represent atelectasis versus superimposed pneumonia. 2. Redemonstration of thoracic and abdominal metastatic disease with mixed change. Most lesions appear stable with a few interval progression (right axillary metastatic lymphadenopathy. Retroperitoneal left periaortic metastatic lymphadenopathy, pelvic presacral cystic mass). 3. Redemonstration of peritoneal carcinomatosis. No significant ascites. 4. Stable blastic skeletal metastasis. 5. Moderate transverse and descending colonic distention with liquid stool. Abrupt transition in the mid sigmoid colon which may relate to malignant partial obstruction or stricture. Mild interval improvement of colonic edema. No evidence of colonic perforation or pneumatosis.      MRI BRAIN WO CONTRAST    Result Date: 10/4/2022  1. New subtle focus of T2/FLAIR hyperintensity in the lateral left frontal cortex and subcortical white matter is nonspecific and may reflect sequela of prior ischemia or mild posterior reversal encephalopathy syndrome. Short interval follow-up is recommended. 2. No evidence of acute infarct or intracranial hemorrhage. 3. Stable small foci of right occipital encephalomalacia in keeping with sequela of prior insult. ASSESSMENT & PLAN     ASSESSMENT / PLAN:     Acute metabolic encephalopathy  - Possibly secondary to opioid use versus possible seizure-like activity  - Neurology consulted, continued on LTME, suggests underlying structural defect and increased risk for focal onset seizures. -Mentation improved, ongoing neurology work-up. Subclinical seizures  - Neurology consulted recommends Keppra 1 g twice daily, Versed 100 mg twice daily and Lamictal 125 mg twice daily.  - MRI brain shows no acute infarct or hemorrhage and stable small foci of right occipital encephalomalacia.  -Follow-up MRI brain ordered by neurology for PRES versus brain metastasis    Aspiration pneumonia  - Possibly secondary to altered mentation and acute metabolic encephalopathy  - Continue Levaquin  - Supplemental oxygen therapy as needed. 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 - Relapse in last relapse in 2014, lost to follow-up since  - Metastatic relapse again, biopsy confirmed ovarian cancer recurrence with intra-abdominal and splenic involvement as well as lung metastasis  - Currently on active chemotherapy last treatment on 09/29/2022, hematology/oncology continues to follow the patient.  - GI recommended colonic stent if patient goes incomplete large bowel obstruction.      Lower extremity lymphedema  - Continue Lasix 20 mg daily    Primary hypertension  - Started on Norvasc 5 mg daily  - Monitor blood pressure    History of DVT/PE  - Continue anticoagulation with Eliquis 5 mg twice daily    DVT ppx : Eliquis 5 mg twice daily  GI ppx: Not indicated    PT/OT: Not indicated  Discharge Planning / SW:  to assist with discharge planning.     Shi Webster MD  Internal Medicine Resident, PGY-1  [place], [place].  10/8/2022, 8:18 AM

## 2022-10-08 NOTE — PLAN OF CARE
Problem: Respiratory - Adult  Goal: Achieves optimal ventilation and oxygenation  Outcome: Progressing     Problem: Discharge Planning  Goal: Discharge to home or other facility with appropriate resources  Outcome: Progressing     Problem: Safety - Adult  Goal: Free from fall injury  Outcome: Progressing     Problem: ABCDS Injury Assessment  Goal: Absence of physical injury  Outcome: Progressing     Problem: Skin/Tissue Integrity  Goal: Absence of new skin breakdown  Description: 1. Monitor for areas of redness and/or skin breakdown  2. Assess vascular access sites hourly  3. Every 4-6 hours minimum:  Change oxygen saturation probe site  4. Every 4-6 hours:  If on nasal continuous positive airway pressure, respiratory therapy assess nares and determine need for appliance change or resting period.   Outcome: Progressing     Problem: Pain  Goal: Verbalizes/displays adequate comfort level or baseline comfort level  Outcome: Progressing     Problem: Chronic Conditions and Co-morbidities  Goal: Patient's chronic conditions and co-morbidity symptoms are monitored and maintained or improved  Outcome: Progressing

## 2022-10-08 NOTE — PROGRESS NOTES
Neurology Nurse Practitioner Progress Note      INTERVAL HISTORY: This is a 59 y.o.  female admitted 10/3/2022 for acute onset altered mentation. This is a follow-up neurology progress note. The patient was examined and the chart was reviewed. Discussed with the RN. Patient reported that auditory hallucinations are \"driving her crazy\". Otherwise her mentation has improved over time; she was alert and oriented other than thought it was 2023. Denied any other new symptoms. HPI: Lelo Mccarthy is a 59 y.o. 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 Daughter reported that on the morning of arrival they let her mother sleep in. When they went in to check on her later patient was difficult to arouse. Patient's other daughter saw the patient vomiting on her bed through camera and then she became incontinent and behaving in an erratic way. Seizures were witnessed. EMS were called. Last known well was 2300 the night prior. Patient has prior history of PRES 10/2020 with new onset seizure disorder during that time. Reportedly patient has had multiple hospitalization for breakthrough seizures with negative work-up and AED adjustments. Currently she has been taking Keppra 1 g twice daily and Lamictal 125 mg twice daily. Neurology was consulted on 10/4 for ongoing disorientation and agitation, after extubation on 10/4.       amLODIPine  5 mg Oral Daily    levoFLOXacin  500 mg Oral Daily    lacosamide  100 mg Oral BID    levETIRAcetam  1,000 mg Oral BID    famotidine  20 mg Oral BID    docusate  100 mg Oral Daily    apixaban  5 mg Oral BID    furosemide  20 mg Oral Daily    lamoTRIgine  125 mg Oral BID    sertraline  100 mg Oral Daily    melatonin  5 mg Oral Nightly    sodium chloride flush  5-40 mL IntraVENous 2 times per day       Past Medical History:   Diagnosis Date    Anemia     Bleeding 10/2020    intra-abdominal bleeding -due to splenic mass with GI infiltration. Status post embolization    Cervical cancer (HCC)     Depression     Diabetes mellitus ([facility] [address]     GERD (gastroesophageal reflux disease)     Hx of blood clots     Hypertension     Metastatic cancer ([facility] [address] [facility] 10/2020    extensive intraabdominal and splenic involvement and lung mets. Ovarian cancer ([facility] [address]     low grade serous ovarian carcinoma    Post chemo evaluation     2007: Chemo via med onc (Dr. Ana Neff), 2008: Deanna Brought due to rising CA-125, 2013: intraperitoneal chemo,12/2015: Ca125 - 25     PRES (posterior reversible encephalopathy syndrome)     Splenic lesion        Past Surgical History:   Procedure Laterality Date    ABSCESS DRAINAGE  2013    Franca rectal    ANUS SURGERY      ANAL FISSURECTOMY    CARDIAC CATHETERIZATION      COLECTOMY  03/2013    ex lap, tumor debulking, transverse colectomy w reanastamosis, subgastric omentectomy, intraperitoneal port placement    HYSTERECTOMY, TOTAL ABDOMINAL (CERVIX REMOVED)      IR EMBOLIZATION HEMORRHAGE  10/05/2020    intra-abdominal bleeding -due to splenic mass with GI infiltration. Status post embolization [employer] interlock coils x7. mri condtional 3t ok, safe immediately post implant. IR PORT PLACEMENT EQUAL OR GREATER THAN 5 YEARS  08/24/2020    IR PORT PLACEMENT EQUAL OR GREATER THAN 5 YEARS 8/24/2020 Zelda Lara MD [facility] SPECIAL PROCEDURES    PORT SURGERY      IP Port    TONSILLECTOMY         PHYSICAL EXAM:    Blood pressure (!) 158/81, pulse (!) 101, temperature 98.1 °F (36.7 °C), temperature source Oral, resp. rate 21, height 5' 5\" (1.651 m), weight 151 lb 3.8 oz (68.6 kg), SpO2 94 %. ROS: Auditory hallucinations        Neurological Examination:  Mental status   Patient reported that auditory hallucinations are \"driving her crazy\". No hallucinations noted at this time. Otherwise her mentation has improved over time; she was alert and oriented other than thought it was 2023; has been following simple commands   Cranial nerves Grossly intact   Motor function  Strength: Patient has been using all limbs purposefully and equally  Normal bulk and tone                Sensory function Grossly intact     Cerebellar No visible tremors   Reflex function Intact    Gait                  Not tested       DATA    Lab Results   Component Value Date    WBC 7.7 10/08/2022    RBC 4.30 10/08/2022    HGB 12.6 10/08/2022    HCT 39.6 10/08/2022    PLT See Reflexed IPF Result 10/08/2022    ALT 9 10/03/2022    AST 18 10/03/2022     10/08/2022    K 3.5 (L) 10/08/2022    MG 2.2 10/08/2022    PHOS 4.0 10/08/2022     10/08/2022    AMMONIA 36 10/03/2022    CREATININE 1.03 (H) 10/08/2022    BUN 6 10/08/2022    CO2 21 10/08/2022    TSH 2.93 03/09/2021    INR 1.2 05/03/2022    [id number] 654 06/04/2022    FOLATE 8.7 06/04/2022    LABA1C 5.2 04/13/2022     Lab Results   Component Value Date    CHOL 131 03/10/2021     Lab Results   Component Value Date    TRIG 147 03/10/2021     Lab Results   Component Value Date    HDL 33 (L) 03/10/2021     Lab Results   Component Value Date    LDLCHOLESTEROL 69 03/10/2021     Lab Results   Component Value Date    VLDL NOT REPORTED 03/10/2021     Lab Results   Component Value Date    CHOLHDLRATIO 4.0 03/10/2021         DIAGNOSTIC DATA:  CT HEAD (10/3/2022): No acute intracranial abnormality     MRI BRAIN (10/4/2022):   1. New subtle focus of T2/FLAIR hyperintensity in lateral L frontal cortex & subcortical white matter    is nonspecific and may reflect sequela of prior infarct or mild PRES. 2. Stable small foci of R occipital encephalomalacia; sequela of prior insult. MRI BRAIN W/WO: Awaited    CTA HEAD & NECK (10/3/2022):  No LVO      ECHO (2/22/2022): EF 55%      LTME (10/4 -   Day 1: During the recording, the patient had 1 are \"driving her crazy\". Otherwise her mentation has improved over time; she was A&O other than thought it was 2023    MRI brain - hyperintensity in lateral L frontal cortex & subcortical white matter; mild PRES? LTME - total of 18 subclinical seizures along with mild to moderate encephalopathy (last seizure on 10/6/2022)    Prior Hx PRES with new onset seizure disorder in 10/2020    HTN urgency    Chronic DVT; on Eliquis 5 mg BID    Hx recurrent ovarian CA with peritoneal & lung mets, intra-abdominal bleeding d/t splenic mass with GI infiltration s/p embolization (10/2020); currently on active chemotherapy. Heme/Onc on bord    Comorbid conditions - DM, GERD, depression          PLAN:  MRI brain w/wo contrast - still awaited    Continue LTME    Continue Vimpat 100 mg BID PO, Keppra 1 g BID PO & Lamictal 125 BID    Patient is on Levaquin    Continue PT/OT; she ambulated 21' with hand-held assistance    Will follow    Please note that this note was generated using a voice recognition dictation software. Although every effort was made to ensure the accuracy of this automated transcription, some errors in transcription may have occurred.

## 2022-10-08 NOTE — PROGRESS NOTES
Comprehensive Nutrition Assessment    Type and Reason for Visit:  Consult    Nutrition Recommendations/Plan:   Start high protein/high calorie ONS  Monitor weight, labs and intake     Malnutrition Assessment:  Malnutrition Status:  No malnutrition (10/08/22 1324)    Context:  Acute Illness     Findings of the 6 clinical characteristics of malnutrition:  Energy Intake:  Mild decrease in energy intake (Comment)  Weight Loss:  No significant weight loss     Body Fat Loss:  No significant body fat loss     Muscle Mass Loss:  No significant muscle mass loss    Fluid Accumulation:  Unable to assess     Strength:  Not Performed    Nutrition Assessment:    Consult for poor PO. Patient admitted for septicemia with PMH T2DM, cancer, AMS. She reports she has a good appetite but dislikes the hospital food. She is in agreement with trying chocolate ensure. Nutrition Related Findings:    Labs/meds reviewed         Current Nutrition Intake & Therapies:    Average Meal Intake: 26-50%, 1-25%  Average Supplements Intake: None Ordered  ADULT DIET; Regular  ADULT ORAL NUTRITION SUPPLEMENT; Breakfast, Lunch, Dinner; Standard High Calorie/High Protein Oral Supplement    Anthropometric Measures:  Height: 5' 5\" (165.1 cm)  Ideal Body Weight (IBW): 125 lbs (57 kg)       Current Body Weight: 151 lb (68.5 kg), 120.8 % IBW. Weight Source: Bed Scale  Current BMI (kg/m2): 25.1                          BMI Categories: Overweight (BMI 25.0-29. 9)    Estimated Daily Nutrient Needs:  Energy Requirements Based On: Kcal/kg  Weight Used for Energy Requirements: Current  Energy (kcal/day): 1263-0914 kcal  Weight Used for Protein Requirements: Current  Protein (g/day): 75-80g  Method Used for Fluid Requirements: 1 ml/kcal  Fluid (ml/day): 1700 mL or per MD    Nutrition Diagnosis:   Inadequate oral intake related to  (dislike of hospital food) as evidenced by intake 0-25%, intake 26-50%    Nutrition Interventions:   Food and/or Nutrient Delivery: Continue Current Diet, Start Oral Nutrition Supplement  Nutrition Education/Counseling: No recommendation at this time  Coordination of Nutrition Care: Continue to monitor while inpatient  Plan of Care discussed with: Patient    Goals:     Goals: PO intake 75% or greater       Nutrition Monitoring and Evaluation:   Behavioral-Environmental Outcomes: None Identified  Food/Nutrient Intake Outcomes: Food and Nutrient Intake, Supplement Intake  Physical Signs/Symptoms Outcomes: Biochemical Data, GI Status, Skin, Weight    Discharge Planning:     Too soon to determine     Rosalina Boyd, [address]  Contact: [phone]

## 2022-10-08 NOTE — PROGRESS NOTES
Pulmonary Progress Note    CC:  Chief Complaint   Patient presents with    Altered Mental Status      Subjective:  No acute events   On ra       Review of Systems -  General ROS: negative for - chills, fatigue, fever or weight loss  ENT ROS: negative for - headaches, oral lesions or sore throat  Cardiovascular ROS: no chest pain , orthopnea or pnd   Gastrointestinal ROS: no abdominal pain, change in bowel habits, or black or bloody stools  Skin - no rash   Neuro - no blurry vision , no loc . No focal weakness       Immunization   Immunization History   Administered Date(s) Administered    Hepatitis B 01/03/2008    Influenza A (A1P1-56) Vaccine PF IM 12/14/2009    Influenza, FLUARIX, FLULAVAL, FLUZONE (age 10 mo+) AND AFLURIA, (age 1 y+), PF, 0.5mL 10/21/2020    Influenza, FLUCELVAX, (age 10 mo+), MDCK, PF, 0.5mL 11/07/2018, 11/21/2019    Pneumococcal Conjugate 13-valent ([id number]) 10/21/2020    Pneumococcal Polysaccharide ([id number]) 12/01/2010, 12/11/2020          PAST MEDICAL HISTORY:       Diagnosis Date    Anemia     Bleeding 10/2020    intra-abdominal bleeding -due to splenic mass with GI infiltration. Status post embolization    Cervical cancer (HCC)     Depression     Diabetes mellitus ([address]     GERD (gastroesophageal reflux disease)     Hx of blood clots     Hypertension     Metastatic cancer ([address] 10/2020    extensive intraabdominal and splenic involvement and lung mets.     Ovarian cancer ([address]     low grade serous ovarian carcinoma    Post chemo evaluation     2007: Chemo via med onc (Dr. Tracie Kenney), 2008: Dione Dark due to rising CA-125, 2013: intraperitoneal chemo,12/2015: Ca125 - 25     PRES (posterior reversible encephalopathy syndrome)     Splenic lesion          Family History:       Problem Relation Age of Onset    Alcohol Abuse Mother     Cirrhosis Mother        SURGICAL HISTORY:   Past Surgical History:   Procedure Laterality Date    ABSCESS DRAINAGE  2013    Franca rectal    ANUS SURGERY      ANAL FISSURECTOMY    CARDIAC CATHETERIZATION      COLECTOMY  03/2013    ex lap, tumor debulking, transverse colectomy w reanastamosis, subgastric omentectomy, intraperitoneal port placement    HYSTERECTOMY, TOTAL ABDOMINAL (CERVIX REMOVED)      IR EMBOLIZATION HEMORRHAGE  10/05/2020    intra-abdominal bleeding -due to splenic mass with GI infiltration. Status post embolization [employer] interlock coils x7. mri condtional 3t ok, safe immediately post implant. IR PORT PLACEMENT EQUAL OR GREATER THAN 5 YEARS  08/24/2020    IR PORT PLACEMENT EQUAL OR GREATER THAN 5 YEARS 8/24/2020 Ladonna Jaffe MD [facility]    PORT SURGERY      IP Port    TONSILLECTOMY                TOBACCO:   reports that she has been smoking cigarettes. She has a 20.00 pack-year smoking history. She has never used smokeless tobacco.  ETOH:   reports that she does not currently use alcohol. ALLERGIES:    Allergies   Allergen Reactions    Carboplatin Anaphylaxis    Ceftriaxone Rash     Had a rash on ceftriaxone December 2020, [address] Meds:   Prior to Admission medications    Medication Sig Start Date End Date Taking? Authorizing Provider   STIMULANT LAXATIVE 8.6-50 MG per tablet TAKE 2 TABLETS BY MOUTH NIGHTLY. 10/6/22   Shanda Medhkour, DO   morphine (MS CONTIN) 15 MG extended release tablet TAKE 1 TABLET BY MOUTH 2 TIMES DAILY FOR 30 DAYS.  9/28/22 10/28/22  Sanam Grace MD   lamoTRIgine (LAMICTAL) 25 MG tablet Take 5 tablets by mouth 2 times daily 9/22/22   Shanda Medhkour, DO   LORazepam (ATIVAN) 1 MG tablet TAKE 1 TABLET BY MOUTH EVERY 8 HOURS AS NEEDED FOR ANXIETY FOR UP TO 30 DAYS. 9/21/22 10/21/22  Rani Bhatia MD   morphine (MS CONTIN) 30 MG extended release tablet TAKE 1 TABLET BY MOUTH 2 TIMES DAILY FOR 30 DAYS. 9/21/22 10/21/22  Rani Bhatia MD   sertraline (ZOLOFT) 100 MG tablet TAKE 1 TABLET BY MOUTH DAILY 9/21/22 10/21/22  Sanam Grace MD oxyCODONE-acetaminophen (PERCOCET) 5-325 MG per tablet Take 1 tablet by mouth every 4 hours as needed for Pain for up to 30 days. 9/13/22 10/13/22  Emery Bhatia MD   FEROSUL 325 (65 Fe) MG tablet TAKE 1 TABLET BY MOUTH ONCE DAILY WITH BREAKFAST 9/9/22   Emery Bhatia MD   levETIRAcetam (KEPPRA) 1000 MG tablet TAKE 1 TABLET (1,000 MG TOTAL) BY MOUTH IN THE MORNING AND 1 TABLET (1,000 MG TOTAL) BEFORE BEDTIME. 9/9/22   Emery Bhatia MD   FEROSUL 325 (65 Fe) MG tablet TAKE 1 TABLET (325 MG TOTAL) BY MOUTH DAILY WITH BREAKFAST. 9/8/22   Shanda Guzman, DO   pantoprazole (PROTONIX) 40 MG tablet Take 1 tablet by mouth daily 9/1/22   Emery Bhtaia MD   Lactobacillus (ACIDOPHILUS) CAPS capsule TAKE 1 TABLET BY MOUTH ONCE DAILY IN THE MORNING AND 1 TABLET BEFORE BEDTIME 8/31/22   Shanda Medhkour, DO   furosemide (LASIX) 20 MG tablet TAKE 1 TABLET (20 MG TOTAL) BY MOUTH DAILY.  8/29/22   Shanda Medhkour, DO   dicyclomine (BENTYL) 20 MG tablet TAKE 1 TABLET (20 MG TOTAL) BY MOUTH IN THE MORNING AND 1 TABLET (20 MG TOTAL) BEFORE BEDTIME. 8/29/22   Shanda Medhkour, DO   potassium chloride (MICRO-K) 10 MEQ extended release capsule TAKE 1 CAPSULE (10 MEQ TOTAL) BY MOUTH IN THE MORNING. 8/29/22   Shanda Medhkour, DO   Handicap Placard MISC 5 years 7/19/22   Belle Foster Medhkobreanna, DO   loperamide (IMODIUM) 2 MG capsule Take 2 mg by mouth 4 times daily as needed for Diarrhea    Historical MD Luisa   ondansetron (ZOFRAN-ODT) 4 MG disintegrating tablet Take 1 tablet by mouth every 8 hours as needed for Nausea or Vomiting 5/13/22   Maria Luisa Vanegas MD   senna (SENOKOT) 8.6 MG tablet Take 1 tablet by mouth 2 times daily 5/2/22 5/2/23  Sridhar De Leon MD   melatonin 5 MG TABS tablet Take 1 tablet by mouth daily 4/13/22   Shanda Hinds DO   benzonatate (TESSALON PERLES) 100 MG capsule Take 1 capsule by mouth 3 times daily as needed for Cough 4/13/22   Shanda Hinds DO   apixaban (ELIQUIS) 5 MG TABS tablet Take 1 tablet by mouth 2 times daily 4/8/22   Sanam Grace MD   hydrocortisone (ANUSOL-HC) 2.5 % CREA rectal cream Apply on affected area twice daily 2/28/22   Veena Grover MD         Intake/Output Summary (Last 24 hours) at 10/8/2022 0946  Last data filed at 10/8/2022 0400  Gross per 24 hour   Intake 10 ml   Output 650 ml   Net -640 ml         Diet   ADULT DIET; Regular    Vitals:   BP (!) 158/81   Pulse 94   Temp 98.1 °F (36.7 °C) (Oral)   Resp 18   Ht 5' 5\" (1.651 m)   Wt 151 lb 3.8 oz (68.6 kg)   SpO2 93%   BMI 25.20 kg/m²  on         I/O (24 Hours)    Patient Vitals for the past 8 hrs:   BP Temp Temp src Pulse Resp SpO2   10/08/22 0806 (!) 158/81 98.1 °F (36.7 °C) Oral 94 18 93 %   10/08/22 0400 (!) 143/69 99 °F (37.2 °C) Oral 80 18 --       Intake/Output Summary (Last 24 hours) at 10/8/2022 0946  Last data filed at 10/8/2022 0400  Gross per 24 hour   Intake 10 ml   Output 650 ml   Net -640 ml     I/O last 3 completed shifts: In: 2768.8 [P.O.:945; I.V.:1280.1; IV Piggyback:543.7]  Out: 650 [Urine:650]   Date 10/08/22 0000 - 10/08/22 2359   Shift 2907-4824 8018-3005 2941-9317 24 Hour Total   INTAKE   Shift Total(mL/kg)       OUTPUT   Urine(mL/kg/hr) 650(1.2)   650   Shift Total(mL/kg) 650(9.5)   650(9.5)   Weight (kg) 68.6 68.6 68.6 68.6     Patient Vitals for the past 96 hrs (Last 3 readings):   Weight   10/07/22 0600 151 lb 3.8 oz (68.6 kg)   10/06/22 0600 153 lb 7 oz (69.6 kg)          PHYSICAL EXAMINATION:  Head and neck atraumatic, normocephalic    Lymph nodes-no cervical, supraclavicular lymphadenopathy    Neck-no JVP elevation    Lungs - Ventilating all lobes without any rales, rhonchi, wheezes or dullness. No bronchial breath sounds. Chest expansion equal bilaterally    CVS- S1, S2 regular. No S3 no S4, no murmurs    Abdomen-nontender, nondistended. Bowel sounds are present.   No organomegaly    Lower extremity-no edema    Upper extremity-no edema    Neurological-grossly normal cranial nerves. No overt motor deficit            Medications:    Scheduled Meds:   amLODIPine  5 mg Oral Daily    potassium chloride  40 mEq Oral Once    levoFLOXacin  500 mg Oral Daily    lacosamide  100 mg Oral BID    levETIRAcetam  1,000 mg Oral BID    famotidine  20 mg Oral BID    docusate  100 mg Oral Daily    apixaban  5 mg Oral BID    furosemide  20 mg Oral Daily    lamoTRIgine  125 mg Oral BID    sertraline  100 mg Oral Daily    melatonin  5 mg Oral Nightly    sodium chloride flush  5-40 mL IntraVENous 2 times per day       Continuous Infusions:   sodium chloride 100 mL/hr at 10/08/22 0940    sodium chloride Stopped (10/07/22 0503)       PRN Meds:  LORazepam, oxyCODONE-acetaminophen, morphine, albuterol, racepinephrine HCl, sodium chloride nebulizer, sodium chloride flush, sodium chloride, ondansetron **OR** ondansetron, polyethylene glycol, acetaminophen **OR** acetaminophen, potassium chloride **OR** potassium chloride    Labs:  CBC:   Recent Labs     10/06/22  0403 10/07/22  0443 10/08/22  0412   WBC 4.6 8.8 7.7   HGB 12.5 12.3 12.6   HCT 37.7 39.0 39.6   MCV 92.6 92.9 92.1   PLT See Reflexed IPF Result See Reflexed IPF Result See Reflexed IPF Result     BMP:   Recent Labs     10/06/22  0403 10/06/22  1208 10/07/22  0443 10/07/22  1429 10/08/22  0412     --  145*  --  137   K 3.5*   < > 3.0* 3.9 3.5*     --  108*  --  103   CO2 27  --  24  --  21   PHOS  --   --   --   --  4.0   BUN 3*  --  3*  --  6   CREATININE 0.36*  --  0.64  --  1.03*    < > = values in this interval not displayed. LIVER PROFILE: No results for input(s): AST, ALT, LIPASE, BILIDIR, BILITOT, ALKPHOS in the last 72 hours. Invalid input(s): AMYLASE,  ALB  PT/INR: No results for input(s): PROTIME, INR in the last 72 hours. APTT: No results for input(s): APTT in the last 72 hours.   UA:No results for input(s): NITRITE, COLORU, PHUR, LABCAST, WBCUA, RBCUA, MUCUS, TRICHOMONAS, YEAST, BACTERIA, CLARITYU, SPECGRAV, LEUKOCYTESUR, UROBILINOGEN, BILIRUBINUR, BLOODU, GLUCOSEU, AMORPHOUS in the last 72 hours. Invalid input(s): KETONESU  No results for input(s): PHART, DSL6LMC, PO2ART in the last 72 hours. ABG   No results found for: PH, PCO2, PO2, HCO3, O2SAT  Lab Results   Component Value Date/Time    MODE [facility] 10/04/2022 04:16 AM       CT HEAD WO CONTRAST    Result Date: 10/3/2022  1. No acute intracranial abnormality. 2. No evidence for significant stenosis or occlusion. 3. Consolidation in the visualized lungs bilaterally that is worse on the right compared to the left. Correlate with report from dedicated CT chest, abdomen, and pelvis for further discussion. XR CHEST PORTABLE    Result Date: 10/5/2022  Interval extubation. Interstitial opacities have increased in the interval, for which developing edema should be considered. XR CHEST PORTABLE    Result Date: 10/3/2022  1. Endotracheal tube tip lies 5 cm above the alejandro. 2. Enteric tube in the stomach with the tip at the level of the proximal to mid gastric body. 3. Stable right mid I a frame elevation resulting in right hemithorax volume loss. 4. There is progressive curvilinear right lung base opacity which may represent progressive atelectasis. 5. Stable perihilar interstitial reticulonodular densities. No acute consolidation or pulmonary edema. CTA HEAD NECK W CONTRAST    Result Date: 10/3/2022  1. No acute intracranial abnormality. 2. No evidence for significant stenosis or occlusion. 3. Consolidation in the visualized lungs bilaterally that is worse on the right compared to the left. Correlate with report from dedicated CT chest, abdomen, and pelvis for further discussion. CT CHEST ABDOMEN PELVIS W CONTRAST    Result Date: 10/3/2022  1. Progressive bibasilar and left lower lobe consolidative change which may represent atelectasis versus superimposed pneumonia. 2. Redemonstration of thoracic and abdominal metastatic disease with mixed change.   Most lesions appear stable with a few interval progression (right axillary metastatic lymphadenopathy. Retroperitoneal left periaortic metastatic lymphadenopathy, pelvic presacral cystic mass). 3. Redemonstration of peritoneal carcinomatosis. No significant ascites. 4. Stable blastic skeletal metastasis. 5. Moderate transverse and descending colonic distention with liquid stool. Abrupt transition in the mid sigmoid colon which may relate to malignant partial obstruction or stricture. Mild interval improvement of colonic edema. No evidence of colonic perforation or pneumatosis. MRI BRAIN WO CONTRAST    Result Date: 10/4/2022  1. New subtle focus of T2/FLAIR hyperintensity in the lateral left frontal cortex and subcortical white matter is nonspecific and may reflect sequela of prior ischemia or mild posterior reversal encephalopathy syndrome. Short interval follow-up is recommended. 2. No evidence of acute infarct or intracranial hemorrhage. 3. Stable small foci of right occipital encephalomalacia in keeping with sequela of prior insult. Assessment:   Principal Problem:    [place]  Active Problems:  CAP     Malignant neoplasm of ovary (HCC)    Seizure ([address]    Type 2 diabetes mellitus without complication, without long-term current use of insulin (HCC)    Acute encephalopathy    Seizure disorder, nonconvulsive, with status epilepticus ([address]    Cancer, metastatic to bone ([address]    Chronic deep vein thrombosis (DVT) of femoral vein of left lower extremity (HCC)    Iron deficiency anemia secondary to inadequate dietary iron intake    AMS (altered mental status)    Gastroesophageal reflux disease    Metabolic encephalopathy    Breakthrough seizure ([address]  Resolved Problems:    * No resolved hospital problems.  *       Plan:  Complete levoquin course   AED per neurology   Will sign off      Electronically signed by Edgar Shelby MD on 10/8/2022 at 9:46 AM

## 2022-10-08 NOTE — PLAN OF CARE
Problem: Safety - Adult  Goal: Free from fall injury  10/8/2022 1529 by Gm Garcia RN  Outcome: Progressing  10/8/2022 0658 by Sofia Villalta RN  Outcome: Progressing

## 2022-10-08 NOTE — PROGRESS NOTES
Today's Date: 10/8/2022  Patient Name: Blanca Ennis  Date of admission: 10/3/2022  1:07 PM  Patient's age: 59 y.o., 1965  Admission Dx: Septicemia ([facility] [address] [A41.9]  Altered mental status, unspecified altered mental status type [R41.82]  AMS (altered mental status) [R41.82]      Requesting Physician: Bandar Vanegas MD    CHIEF COMPLAINT: Altered mental status. Sepsis. Recurrent metastatic ovarian cancer. History Obtained From:  patient, electronic medical record    Interval history:  Patient seen and examined bedside. Patient's blood pressure stable. Slightly tachycardic. Afebrile. Currently saturating well on room air. Patient is having delusions, and states that she is repeatedly hearing noises. Patient was very upset and tearful. HISTORY OF PRESENT ILLNESS:      The patient is a 59 y.o.  female who is admitted to the hospital for further management of altered mental status. She had increasing shortness of breath. She was admitted for sepsis. She was intubated for airway protection. Patient was admitted on October 3. She was extubated yesterday. Were consulted today because of her history of recurrent ovarian cancer. Patient is mentally alert and oriented. No respiratory distress. No fever. She is not in pain. Brief oncologic history:  DIAGNOSIS:       Recurrent ovarian cancer. Original diagnosis 2005 with multiple recurrences. Diffuse metastasis. CURRENT THERAPY:         Doxil/ Avastin started 9/18/2020. Avastin was discontinued due to recent GI bleeding. Status post recent vascular embolization  S/p seizure disorder. Gemzar started 2/26/2021. Interrupted due to hospitalization and problem with compliance.    Evidence of disease progression on CT scan 2/22/22  Added Carboplatin to Gemzar March 2022        BRIEF CASE HISTORY:      Ms. Eri Thomas is a very pleasant 59 y.o. female with history of multiple co morbidities as listed. The patient seen in consultation for ovarian cancer with multiple recurrences. 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 She had surgical debulking and she had systemic treatment with Taxol and carboplatin for 6 cycles. Patient was in remission for about 2 years. She had a relapse in 2007 and treated with topotecan with good results. Patient relapsed again in 2008 and was treated with Taxol carboplatin. After 3 cycles of systemic chemotherapy she had problems with carboplatin so she finished 3 more cycles with Taxol. She did well again for 2 to 3 years until she had a relapse in 2012 and she had intraperitoneal chemotherapy treatment with Taxol. Patient was last seen by her oncologist in 2014 at which time she had relatively stable disease with normal tumor marker and no significant abnormal images. Patient moved to [place] after that and she was lost for oncology follow-ups. Patient was recently evaluated again here in [address] because of abdominal discomfort. Re imaging confirmed metastatic relapse. Biopsy confirmed ovarian cancer recurrence. Scan showed extensive intraabdominal and splenic involvement and lung mets. She has no symptoms at the present time. Patient denies smoking or alcohol drinking.l       Past Medical History:   has a past medical history of Anemia, Bleeding, Cervical cancer ([address] Depression, Diabetes mellitus ([address] GERD (gastroesophageal reflux disease), Hx of blood clots, Hypertension, Metastatic cancer ([address] Ovarian cancer ([address] Post chemo evaluation, PRES (posterior reversible encephalopathy syndrome), and Splenic lesion. Past Surgical History:   has a past surgical history that includes Hysterectomy, total abdominal; Port Surgery; Tonsillectomy; IR PORT PLACEMENT > 5 YEARS (08/24/2020); Anus surgery; Abscess Drainage (2013); colectomy (03/2013);  IR EMBOLIZATION HEMORRHAGE (10/05/2020); and Cardiac catheterization. Family History: family history includes Alcohol Abuse in her mother; Cirrhosis in her mother. Social History:   reports that she has been smoking cigarettes. She has a 20.00 pack-year smoking history. She has never used smokeless tobacco. She reports that she does not currently use alcohol. She reports that she does not use drugs. Medications:    Prior to Admission medications    Medication Sig Start Date End Date Taking? Authorizing Provider   STIMULANT LAXATIVE 8.6-50 MG per tablet TAKE 2 TABLETS BY MOUTH NIGHTLY. 10/6/22   Shanda Medhkour, DO   morphine (MS CONTIN) 15 MG extended release tablet TAKE 1 TABLET BY MOUTH 2 TIMES DAILY FOR 30 DAYS. 9/28/22 10/28/22  Maria Luisa Vanegas MD   lamoTRIgine (LAMICTAL) 25 MG tablet Take 5 tablets by mouth 2 times daily 9/22/22   Shanda Medthad, DO   LORazepam (ATIVAN) 1 MG tablet TAKE 1 TABLET BY MOUTH EVERY 8 HOURS AS NEEDED FOR ANXIETY FOR UP TO 30 DAYS. 9/21/22 10/21/22  Maria Luisa Vanegas MD   morphine (MS CONTIN) 30 MG extended release tablet TAKE 1 TABLET BY MOUTH 2 TIMES DAILY FOR 30 DAYS. 9/21/22 10/21/22  Maria Luisa Vanegas MD   sertraline (ZOLOFT) 100 MG tablet TAKE 1 TABLET BY MOUTH DAILY 9/21/22 10/21/22  Maria Luisa Vanegas MD   oxyCODONE-acetaminophen (PERCOCET) 5-325 MG per tablet Take 1 tablet by mouth every 4 hours as needed for Pain for up to 30 days.  9/13/22 10/13/22  Emery Bhatia MD   FEROSUL 325 (65 Fe) MG tablet TAKE 1 TABLET BY MOUTH ONCE DAILY WITH BREAKFAST 9/9/22   Emery Bhatia MD   levETIRAcetam (KEPPRA) 1000 MG tablet TAKE 1 TABLET (1,000 MG TOTAL) BY MOUTH IN THE MORNING AND 1 TABLET (1,000 MG TOTAL) BEFORE BEDTIME. 9/9/22   Emrey Bhatia MD   FEROSUL 325 (65 Fe) MG tablet TAKE 1 TABLET (325 MG TOTAL) BY MOUTH DAILY WITH BREAKFAST. 9/8/22   Shanda MedDO thad   pantoprazole (PROTONIX) 40 MG tablet Take 1 tablet by mouth daily 9/1/22   Maria Luisa Vanegas MD   Lactobacillus (ACIDOPHILUS) CAPS capsule TAKE 1 TABLET BY MOUTH ONCE DAILY IN THE MORNING AND 1 TABLET BEFORE BEDTIME 8/31/22   Shanda Medhkour, DO   furosemide (LASIX) 20 MG tablet TAKE 1 TABLET (20 MG TOTAL) BY MOUTH DAILY.  8/29/22   Shanda Medhkour, DO   dicyclomine (BENTYL) 20 MG tablet TAKE 1 TABLET (20 MG TOTAL) BY MOUTH IN THE MORNING AND 1 TABLET (20 MG TOTAL) BEFORE BEDTIME. 8/29/22   Shanda Medhkour, DO   potassium chloride (MICRO-K) 10 MEQ extended release capsule TAKE 1 CAPSULE (10 MEQ TOTAL) BY MOUTH IN THE MORNING. 8/29/22   Shanda Medhkour, DO   Handicap Placard MISC 5 years 7/19/22   Reubin Sandifer Medhkour, DO   loperamide (IMODIUM) 2 MG capsule Take 2 mg by mouth 4 times daily as needed for Diarrhea    Historical Provider, MD   ondansetron (ZOFRAN-ODT) 4 MG disintegrating tablet Take 1 tablet by mouth every 8 hours as needed for Nausea or Vomiting 5/13/22   Annette Goodman MD   senna (SENOKOT) 8.6 MG tablet Take 1 tablet by mouth 2 times daily 5/2/22 5/2/23  José Harrington MD   melatonin 5 MG TABS tablet Take 1 tablet by mouth daily 4/13/22   Shanda Medhkour, DO   benzonatate (TESSALON PERLES) 100 MG capsule Take 1 capsule by mouth 3 times daily as needed for Cough 4/13/22   Shanda Medhkour, DO   apixaban (ELIQUIS) 5 MG TABS tablet Take 1 tablet by mouth 2 times daily 4/8/22   Annette Goodman MD   hydrocortisone (ANUSOL-HC) 2.5 % CREA rectal cream Apply on affected area twice daily 2/28/22   Debbi Graves MD     Current Facility-Administered Medications   Medication Dose Route Frequency Provider Last Rate Last Admin    LORazepam (ATIVAN) tablet 1 mg  1 mg Oral Q4H PRN Jossie Boyd MD        amLODIPine (NORVASC) tablet 5 mg  5 mg Oral Daily Manuel Ames MD   5 mg at 10/08/22 0938    0.9 % sodium chloride infusion   IntraVENous Continuous Jossie Boyd  mL/hr at 10/08/22 0940 New Bag at 10/08/22 0940    levoFLOXacin (LEVAQUIN) tablet 500 mg  500 mg Oral Daily Nicol Welch MD   500 mg at 10/08/22 0551    lacosamide (VIMPAT) tablet 100 mg  100 mg Oral BID Tha Magsi, APRN - CNP   100 mg at 10/08/22 0836    levETIRAcetam (KEPPRA) tablet 1,000 mg  1,000 mg Oral BID Lorriane Dial, DO   1,000 mg at 10/08/22 0836    famotidine (PEPCID) tablet 20 mg  20 mg Oral BID Doreene Elver, DO   20 mg at 10/08/22 0836    docusate (COLACE) 50 MG/5ML liquid 100 mg  100 mg Oral Daily Nicol Welch MD   100 mg at 10/08/22 0837    apixaban (ELIQUIS) tablet 5 mg  5 mg Oral BID Doreene Elver, DO   5 mg at 10/08/22 3379    furosemide (LASIX) tablet 20 mg  20 mg Oral Daily Doreene Elver, DO   20 mg at 10/08/22 0836    lamoTRIgine (LAMICTAL) tablet 125 mg  125 mg Oral BID Doreene Elver, DO   125 mg at 10/08/22 0836    oxyCODONE-acetaminophen (PERCOCET) 5-325 MG per tablet 1 tablet  1 tablet Oral Q4H PRN Doreene Elver, DO        sertraline (ZOLOFT) tablet 100 mg  100 mg Oral Daily Doreene Elver, DO   100 mg at 10/08/22 0836    melatonin tablet 5 mg  5 mg Oral Nightly Doreene Elver, DO   5 mg at 10/07/22 2153    morphine (PF) injection 2 mg  2 mg IntraVENous Q4H PRN Larry Boxey, DO   2 mg at 10/05/22 0228    albuterol (PROVENTIL) nebulizer solution 2.5 mg  2.5 mg Nebulization Q6H PRN Pollo Pond MD   2.5 mg at 10/08/22 0941    racepinephrine HCl (VAPONEFPRIN) 2.25 % nebulizer solution NEBU 11.25 mg  11.25 mg Nebulization Q3H PRN Soheila Patterson MD        sodium chloride nebulizer 0.9 % solution 3 mL  3 mL Nebulization Q4H PRN Mir Patterson MD        sodium chloride flush 0.9 % injection 5-40 mL  5-40 mL IntraVENous 2 times per day Marilyne Elver, DO   10 mL at 10/08/22 0837    sodium chloride flush 0.9 % injection 5-40 mL  5-40 mL IntraVENous PRN Larry Raya DO        0.9 % sodium chloride infusion   IntraVENous PRN Larry Raya DO   Stopped at 10/07/22 0503    ondansetron (ZOFRAN-ODT) disintegrating tablet 4 mg  4 mg Oral Q8H PRN Larry Raya DO        Or    ondansetron [facility]) injection 4 mg  4 mg IntraVENous Q6H PRN Harlen Bibber, DO   4 mg at 10/06/22 2101    polyethylene glycol (GLYCOLAX) packet 17 g  17 g Oral Daily PRN Harlen Bibber, DO        acetaminophen (TYLENOL) tablet 650 mg  650 mg Oral Q6H PRN Harlen Bibber, DO   650 mg at 10/07/22 1156    Or    acetaminophen (TYLENOL) suppository 650 mg  650 mg Rectal Q6H PRN Harlen Bibber, DO        potassium chloride 20 mEq/50 mL IVPB (Central Line)  20 mEq IntraVENous PRN Harlen Bibber, DO   Stopped at 10/05/22 1550    Or    potassium chloride 10 mEq/100 mL IVPB (Peripheral Line)  10 mEq IntraVENous PRN Harlen Bibber,  mL/hr at 10/07/22 1159 10 mEq at 10/07/22 1159       Allergies:  Carboplatin and Ceftriaxone    REVIEW OF SYSTEMS:      General: Positive for weakness and fatigue. Positive for weight loss and decreased appetite. No fever or chills. Eyes: No blurred vision, eye pain or double vision. Ears: No hearing problems or drainage. No tinnitus. Throat: No sore throat, problems with swallowing or dysphagia. Respiratory: No cough, sputum or hemoptysis. No shortness of breath. No pleuritic chest pain. Cardiovascular: No chest pain, orthopnea or PND. No lower extremity edema. No palpitation. Gastrointestinal: No problems with swallowing. No abdominal pain or bloating. No nausea or vomiting. No diarrhea or constipation. No GI bleeding. Genitourinary: No dysuria, hematuria, frequency or urgency. Musculoskeletal: No muscle aches or pains. No limitation of movement. No back pain. No gait disturbance, No joint complaints. Dermatologic: No skin rashes or pruritus. No skin lesions or discolorations. Psychiatric: As above. Hematologic: No history of bleeding tendency. No bruises or ecchymosis. No history of clotting problems. Infectious disease: No fever, chills or frequent infections. Endocrine: No polydipsia or polyuria. No temperature intolerance. Neurologic: No headaches or dizziness.  No weakness or numbness of the extremities. No changes in balance, coordination,  memory, mentation, behavior. Allergic/Immunologic: No nasal congestion or hives. No repeated infections. PHYSICAL EXAM:      BP (!) 158/81   Pulse (!) 101   Temp 98.1 °F (36.7 °C) (Oral)   Resp 21   Ht 5' 5\" (1.651 m)   Wt 151 lb 3.8 oz (68.6 kg)   SpO2 94%   BMI 25.20 kg/m²    Temp (24hrs), Av.5 °F (36.9 °C), Min:98.1 °F (36.7 °C), Max:99 °F (37.2 °C)      General appearance - not in pain or distress  Mental status - alert and oriented  Eyes - pupils equal and reactive, extraocular eye movements intact  Ears - bilateral TM's and external ear canals normal  Nose - normal and patent, no erythema, discharge or polyps  Mouth - mucous membranes moist, pharynx normal without lesions  Neck - supple, no significant adenopathy  Lymphatics - no palpable lymphadenopathy, no hepatosplenomegaly  Chest - clear to auscultation, no wheezes, rales or rhonchi, symmetric air entry  Heart - normal rate, regular rhythm, normal S1, S2, no murmurs, rubs, clicks or gallops  Abdomen - soft, nontender, nondistended, no masses or organomegaly  Neurological - alert, oriented, normal speech, no focal findings or movement disorder noted  Musculoskeletal - no joint tenderness, deformity or swelling  Extremities - peripheral pulses normal, no pedal edema, no clubbing or cyanosis  Skin - normal coloration and turgor, no rashes, no suspicious skin lesions noted           DATA:      Labs:       CBC:   Recent Labs     10/07/22  0443 10/08/22  0412   WBC 8.8 7.7   HGB 12.3 12.6   HCT 39.0 39.6   PLT See Reflexed IPF Result See Reflexed IPF Result       BMP:   Recent Labs     10/07/22  0443 10/07/22  1429 10/08/22  0412   *  --  137   K 3.0* 3.9 3.5*   CO2 24  --  21   BUN 3*  --  6   CREATININE 0.64  --  1.03*   LABGLOM >60  --  >60   GLUCOSE 126*  --  107*       PT/INR: No results for input(s): PROTIME, INR in the last 72 hours. APTT:No results for input(s):  APTT in the last encephalopathy which has resolved. Patient pending repeat MRI. Patient is currently on active chemotherapy treatment for ovarian cancer. Last treatment was September 29, 2022. We will monitor labs closely. Further management for ovarian cancer will be as outpatient. Patient's questions were answered to the best of her satisfaction and she verbalized full understanding and agreement. Thank you for allowing us to participate in the care of this pleasant patient. Discussed with patient and Nurse. Aarti Mtz MD,     Attending Physician Statement   I have discussed the care of this patient, including pertinent history and exam findings, with the resident. I have seen and examined the patient and the key elements of all parts of the encounter have been performed by me. I agree with the assessment, plan and orders as documented by the resident and with changes made to the note.     Eliseo Arreola MD  Hematology/Oncology    Cell: [phone]

## 2022-10-09 ENCOUNTER — APPOINTMENT (OUTPATIENT)
Dept: MRI IMAGING | Age: 57
DRG: 871 | End: 2022-10-09
Payer: COMMERCIAL

## 2022-10-09 LAB
ABSOLUTE EOS #: 0.12 K/UL (ref 0–0.44)
ABSOLUTE IMMATURE GRANULOCYTE: 0.03 K/UL (ref 0–0.3)
ABSOLUTE LYMPH #: 0.99 K/UL (ref 1.1–3.7)
ABSOLUTE MONO #: 1.06 K/UL (ref 0.1–1.2)
ANION GAP SERPL CALCULATED.3IONS-SCNC: 14 MMOL/L (ref 9–17)
BASOPHILS # BLD: 0 % (ref 0–2)
BASOPHILS ABSOLUTE: <0.03 K/UL (ref 0–0.2)
BUN BLDV-MCNC: 11 MG/DL (ref 6–20)
CALCIUM SERPL-MCNC: 8.1 MG/DL (ref 8.6–10.4)
CHLORIDE BLD-SCNC: 106 MMOL/L (ref 98–107)
CO2: 21 MMOL/L (ref 20–31)
CREAT SERPL-MCNC: 1.05 MG/DL (ref 0.5–0.9)
EOSINOPHILS RELATIVE PERCENT: 1 % (ref 1–4)
GFR SERPL CREATININE-BSD FRML MDRD: >60 ML/MIN/1.73M2
GLUCOSE BLD-MCNC: 116 MG/DL (ref 70–99)
HCT VFR BLD CALC: 37.9 % (ref 36.3–47.1)
HEMOGLOBIN: 12.4 G/DL (ref 11.9–15.1)
IMMATURE GRANULOCYTES: 0 %
LYMPHOCYTES # BLD: 12 % (ref 24–43)
MAGNESIUM: 1.9 MG/DL (ref 1.6–2.6)
MCH RBC QN AUTO: 30 PG (ref 25.2–33.5)
MCHC RBC AUTO-ENTMCNC: 32.7 G/DL (ref 28.4–34.8)
MCV RBC AUTO: 91.8 FL (ref 82.6–102.9)
MONOCYTES # BLD: 13 % (ref 3–12)
NRBC AUTOMATED: 0 PER 100 WBC
PDW BLD-RTO: 16.3 % (ref 11.8–14.4)
PLATELET # BLD: 146 K/UL (ref 138–453)
PMV BLD AUTO: 9.9 FL (ref 8.1–13.5)
POTASSIUM SERPL-SCNC: 3.5 MMOL/L (ref 3.7–5.3)
RBC # BLD: 4.13 M/UL (ref 3.95–5.11)
RBC # BLD: ABNORMAL 10*6/UL
SEG NEUTROPHILS: 74 % (ref 36–65)
SEGMENTED NEUTROPHILS ABSOLUTE COUNT: 6.06 K/UL (ref 1.5–8.1)
SODIUM BLD-SCNC: 141 MMOL/L (ref 135–144)
WBC # BLD: 8.3 K/UL (ref 3.5–11.3)

## 2022-10-09 PROCEDURE — A9576 INJ PROHANCE MULTIPACK: HCPCS

## 2022-10-09 PROCEDURE — 36415 COLL VENOUS BLD VENIPUNCTURE: CPT

## 2022-10-09 PROCEDURE — 99232 SBSQ HOSP IP/OBS MODERATE 35: CPT | Performed by: STUDENT IN AN ORGANIZED HEALTH CARE EDUCATION/TRAINING PROGRAM

## 2022-10-09 PROCEDURE — 6370000000 HC RX 637 (ALT 250 FOR IP)

## 2022-10-09 PROCEDURE — 70553 MRI BRAIN STEM W/O & W/DYE: CPT

## 2022-10-09 PROCEDURE — 85025 COMPLETE CBC W/AUTO DIFF WBC: CPT

## 2022-10-09 PROCEDURE — 2060000000 HC ICU INTERMEDIATE R&B

## 2022-10-09 PROCEDURE — 95720 EEG PHY/QHP EA INCR W/VEEG: CPT | Performed by: PSYCHIATRY & NEUROLOGY

## 2022-10-09 PROCEDURE — 83735 ASSAY OF MAGNESIUM: CPT

## 2022-10-09 PROCEDURE — 6370000000 HC RX 637 (ALT 250 FOR IP): Performed by: NURSE PRACTITIONER

## 2022-10-09 PROCEDURE — 6370000000 HC RX 637 (ALT 250 FOR IP): Performed by: STUDENT IN AN ORGANIZED HEALTH CARE EDUCATION/TRAINING PROGRAM

## 2022-10-09 PROCEDURE — 95714 VEEG EA 12-26 HR UNMNTR: CPT

## 2022-10-09 PROCEDURE — APPSS30 APP SPLIT SHARED TIME 16-30 MINUTES: Performed by: NURSE PRACTITIONER

## 2022-10-09 PROCEDURE — 2580000003 HC RX 258: Performed by: STUDENT IN AN ORGANIZED HEALTH CARE EDUCATION/TRAINING PROGRAM

## 2022-10-09 PROCEDURE — 2580000003 HC RX 258

## 2022-10-09 PROCEDURE — 99232 SBSQ HOSP IP/OBS MODERATE 35: CPT | Performed by: INTERNAL MEDICINE

## 2022-10-09 PROCEDURE — 99233 SBSQ HOSP IP/OBS HIGH 50: CPT | Performed by: PSYCHIATRY & NEUROLOGY

## 2022-10-09 PROCEDURE — 6360000004 HC RX CONTRAST MEDICATION

## 2022-10-09 PROCEDURE — 80048 BASIC METABOLIC PNL TOTAL CA: CPT

## 2022-10-09 RX ORDER — POTASSIUM CHLORIDE 20 MEQ/1
10 TABLET, EXTENDED RELEASE ORAL 2 TIMES DAILY WITH MEALS
Status: DISCONTINUED | OUTPATIENT
Start: 2022-10-09 | End: 2022-10-09

## 2022-10-09 RX ORDER — SODIUM CHLORIDE 0.9 % (FLUSH) 0.9 %
10 SYRINGE (ML) INJECTION PRN
Status: DISCONTINUED | OUTPATIENT
Start: 2022-10-09 | End: 2022-10-17 | Stop reason: HOSPADM

## 2022-10-09 RX ORDER — POTASSIUM CHLORIDE 20 MEQ/1
40 TABLET, EXTENDED RELEASE ORAL ONCE
Status: COMPLETED | OUTPATIENT
Start: 2022-10-09 | End: 2022-10-09

## 2022-10-09 RX ORDER — POTASSIUM CHLORIDE 20 MEQ/1
10 TABLET, EXTENDED RELEASE ORAL 2 TIMES DAILY WITH MEALS
Status: DISCONTINUED | OUTPATIENT
Start: 2022-10-10 | End: 2022-10-11

## 2022-10-09 RX ORDER — QUETIAPINE FUMARATE 25 MG/1
25 TABLET, FILM COATED ORAL 2 TIMES DAILY
Status: DISCONTINUED | OUTPATIENT
Start: 2022-10-09 | End: 2022-10-13

## 2022-10-09 RX ADMIN — APIXABAN 5 MG: 5 TABLET, FILM COATED ORAL at 20:45

## 2022-10-09 RX ADMIN — SODIUM CHLORIDE: 9 INJECTION, SOLUTION INTRAVENOUS at 04:35

## 2022-10-09 RX ADMIN — FAMOTIDINE 20 MG: 20 TABLET, FILM COATED ORAL at 08:40

## 2022-10-09 RX ADMIN — AMLODIPINE BESYLATE 5 MG: 5 TABLET ORAL at 08:40

## 2022-10-09 RX ADMIN — LORAZEPAM 1 MG: 1 TABLET ORAL at 18:37

## 2022-10-09 RX ADMIN — SODIUM CHLORIDE, PRESERVATIVE FREE 10 ML: 5 INJECTION INTRAVENOUS at 08:51

## 2022-10-09 RX ADMIN — FUROSEMIDE 20 MG: 20 TABLET ORAL at 08:40

## 2022-10-09 RX ADMIN — LACOSAMIDE 100 MG: 100 TABLET, FILM COATED ORAL at 20:45

## 2022-10-09 RX ADMIN — FAMOTIDINE 20 MG: 20 TABLET, FILM COATED ORAL at 20:45

## 2022-10-09 RX ADMIN — LORAZEPAM 1 MG: 1 TABLET ORAL at 13:16

## 2022-10-09 RX ADMIN — LEVOFLOXACIN 500 MG: 500 TABLET, FILM COATED ORAL at 08:39

## 2022-10-09 RX ADMIN — SERTRALINE 100 MG: 50 TABLET, FILM COATED ORAL at 08:39

## 2022-10-09 RX ADMIN — LAMOTRIGINE 125 MG: 100 TABLET ORAL at 08:39

## 2022-10-09 RX ADMIN — LEVETIRACETAM 1000 MG: 500 TABLET, FILM COATED ORAL at 20:45

## 2022-10-09 RX ADMIN — POTASSIUM CHLORIDE 40 MEQ: 1500 TABLET, EXTENDED RELEASE ORAL at 08:50

## 2022-10-09 RX ADMIN — LEVETIRACETAM 1000 MG: 500 TABLET, FILM COATED ORAL at 08:39

## 2022-10-09 RX ADMIN — LORAZEPAM 1 MG: 1 TABLET ORAL at 08:16

## 2022-10-09 RX ADMIN — LACOSAMIDE 100 MG: 100 TABLET, FILM COATED ORAL at 08:40

## 2022-10-09 RX ADMIN — LAMOTRIGINE 125 MG: 100 TABLET ORAL at 20:45

## 2022-10-09 RX ADMIN — OXYCODONE HYDROCHLORIDE AND ACETAMINOPHEN 1 TABLET: 5; 325 TABLET ORAL at 20:52

## 2022-10-09 RX ADMIN — QUETIAPINE FUMARATE 25 MG: 25 TABLET ORAL at 20:45

## 2022-10-09 RX ADMIN — QUETIAPINE FUMARATE 25 MG: 25 TABLET ORAL at 13:12

## 2022-10-09 RX ADMIN — GADOTERIDOL 10 ML: 279.3 INJECTION, SOLUTION INTRAVENOUS at 09:54

## 2022-10-09 RX ADMIN — Medication 5 MG: at 20:45

## 2022-10-09 RX ADMIN — APIXABAN 5 MG: 5 TABLET, FILM COATED ORAL at 08:40

## 2022-10-09 RX ADMIN — OXYCODONE HYDROCHLORIDE AND ACETAMINOPHEN 1 TABLET: 5; 325 TABLET ORAL at 08:50

## 2022-10-09 RX ADMIN — LORAZEPAM 1 MG: 1 TABLET ORAL at 00:36

## 2022-10-09 ASSESSMENT — PAIN DESCRIPTION - LOCATION: LOCATION: NECK

## 2022-10-09 ASSESSMENT — PAIN SCALES - GENERAL
PAINLEVEL_OUTOF10: 8
PAINLEVEL_OUTOF10: 8

## 2022-10-09 ASSESSMENT — PAIN - FUNCTIONAL ASSESSMENT: PAIN_FUNCTIONAL_ASSESSMENT: PREVENTS OR INTERFERES SOME ACTIVE ACTIVITIES AND ADLS

## 2022-10-09 ASSESSMENT — PAIN DESCRIPTION - DESCRIPTORS: DESCRIPTORS: ACHING

## 2022-10-09 NOTE — PROGRESS NOTES
[facility]  Internal Medicine Teaching Residency Program  Inpatient Daily Progress Note  ______________________________________________________________________________    Patient: Avi Dyson [facility]  YOB: 1965   WOH:[id number]    Acct: [de-identified]     Room: 0140/0140-01  Admit date: 10/3/2022  Today's date: 10/09/22  Number of days in the hospital: 6    SUBJECTIVE   Admitting Diagnosis: Septicemia ([address]  CC: Altered mental status  Pt examined at bedside. Chart & results reviewed. Hemodynamically stable, afebrile and maintaining saturation on room air. Patient still complaining of auditory hallucinations at night and even episodes during day. She continues to have sitter in room. She denies any headache or dizziness this morning. She continues to be on LTM E by neurology for subclinical seizures. She is waiting for MRI brain ordered by neurology for possible PRES versus brain metastasis. Started on Seroquel 25 mg nightly by neurology yesterday. Continues to be on Levaquin for aspiration pneumonia. Pulmonology recommends completing Levaquin and signed off. On examination no wheezes or crackles. Patient wanted to know for how long she will receive the antibiotics for pneumonia and if she needs to come back for a chest x-ray after completing antibiotics. Her questions were answered and she seemed satisfied. ROS:  Constitutional:  negative for chills, fevers, sweats  Respiratory:  negative for cough, dyspnea on exertion, hemoptysis, shortness of breath, wheezing  Cardiovascular:  negative for chest pain, chest pressure/discomfort, lower extremity edema, palpitations  Gastrointestinal:  negative for abdominal pain, constipation, diarrhea, nausea, vomiting  Neurological:  negative for dizziness, headache  BRIEF HISTORY     61-year-old female presented with chief complaints of altered mental status and ED.   Was found drowsy by daughter on 10/2/2022. She was brought in by EMS with altered mental status opening eyes to painful stimuli and low GCS. In the ER she was meeting SIRS criteria and decision was made to intubate the patient for airway protection. Before intubation Narcan was also tried as there was a suspicion for acute toxic metabolic encephalopathy due to prescription opioid use. She was transferred to ICU for further management. In the ICU initially after intubation the patient was combative and not intolerant of the ET tube so she was sedated with propofol. Also started on IV fluids for ongoing hypotension. She has known history of seizures and was on antiseizure medications at home. CT scan chest showed bilateral pulmonary infiltrates concerning for aspiration pneumonia and she was started on broad-spectrum antibiotics. Neurology was also consulted for seizure work-up. 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 She has hx of intra-abdominal bleeding secondary to splenic mass with GI infiltration s/p embolization of the spleen. 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 They recommend stent placement if patient goes into complete lower bowel obstruction. During the ICU stay, the patient was on LTME which showed 6 subclinical seizures over 24 hours with left hand flapping and left head deviation during for electroclinical seizures. The patient was started with home dose of Keppra with loading dose 2 g and 1 mg Ativan for seizures and maintenance dose of Keppra increased to 1 g twice daily. Transferred out of ICU. Ongoing neurology work-up for possible press versus intracranial mets.     OBJECTIVE     Vital Signs:  /75   Pulse 91   Temp 98.4 °F (36.9 °C) (Oral)   Resp 18   Ht 5' 5\" (1.651 m)   Wt 151 lb 3.8 oz (68.6 kg) SpO2 91%   BMI 25.17 kg/m²     Temp (24hrs), Av.3 °F (36.8 °C), Min:97.5 °F (36.4 °C), Max:99.2 °F (37.3 °C)    In: 1700   Out: -     Physical Exam:  Constitutional: This is a well developed, well nourished,  59y.o. year old female who is alert, oriented, cooperative and in no apparent distress. Head:normocephalic and atraumatic. EENT:  PERRLA. No conjunctival injections. Septum was midline, mucosa was without erythema, exudates or cobblestoning. No thrush was noted. Neck: Supple without thyromegaly. No elevated JVP. Trachea was midline. Respiratory: Chest was symmetrical without dullness to percussion. Breath sounds bilaterally were clear to auscultation. There were no wheezes, rhonchi or rales. There is no intercostal retraction or use of accessory muscles. No egophony noted. Cardiovascular: Regular without murmur, clicks, gallops or rubs. Abdomen: Slightly rounded and soft without organomegaly. No rebound, rigidity or guarding was appreciated. Lymphatic: No lymphadenopathy. Musculoskeletal: Normal curvature of the spine. No gross muscle weakness. Extremities:  No lower extremity edema, ulcerations, tenderness, varicosities or erythema. Muscle size, tone and strength are normal.  No involuntary movements are noted. Skin:  Warm and dry. Good color, turgor and pigmentation. No lesions or scars.   No cyanosis or clubbing  Neurological/Psychiatric: The patient's general behavior, level of consciousness, thought content and emotional status is normal.        Medications:  Scheduled Medications:    potassium chloride  40 mEq Oral Once    [START ON 10/10/2022] potassium chloride  10 mEq Oral BID WC    amLODIPine  5 mg Oral Daily    QUEtiapine  25 mg Oral Nightly    levoFLOXacin  500 mg Oral Daily    lacosamide  100 mg Oral BID    levETIRAcetam  1,000 mg Oral BID    famotidine  20 mg Oral BID    docusate  100 mg Oral Daily    apixaban  5 mg Oral BID    furosemide  20 mg Oral Daily lamoTRIgine  125 mg Oral BID    sertraline  100 mg Oral Daily    melatonin  5 mg Oral Nightly    sodium chloride flush  5-40 mL IntraVENous 2 times per day     Continuous Infusions:    sodium chloride 100 mL/hr at 10/09/22 0435    sodium chloride Stopped (10/07/22 0503)     PRN MedicationsLORazepam, 1 mg, Q4H PRN  oxyCODONE-acetaminophen, 1 tablet, Q4H PRN  morphine, 2 mg, Q4H PRN  albuterol, 2.5 mg, Q6H PRN  racepinephrine HCl, 11.25 mg, Q3H PRN  sodium chloride nebulizer, 3 mL, Q4H PRN  sodium chloride flush, 5-40 mL, PRN  sodium chloride, , PRN  ondansetron, 4 mg, Q8H PRN   Or  ondansetron, 4 mg, Q6H PRN  polyethylene glycol, 17 g, Daily PRN  acetaminophen, 650 mg, Q6H PRN   Or  acetaminophen, 650 mg, Q6H PRN      Diagnostic Labs:  CBC:   Recent Labs     10/07/22  0443 10/08/22  0412 10/09/22  0422   WBC 8.8 7.7 8.3   RBC 4.20 4.30 4.13   HGB 12.3 12.6 12.4   HCT 39.0 39.6 37.9   MCV 92.9 92.1 91.8   RDW 16.2* 16.4* 16.3*   PLT See Reflexed IPF Result See Reflexed IPF Result 146       BMP:   Recent Labs     10/07/22  0443 10/07/22  1429 10/08/22  0412 10/09/22  0422   *  --  137 141   K 3.0* 3.9 3.5* 3.5*   *  --  103 106   CO2 24  --  21 21   PHOS  --  3.2 4.0  --    BUN 3*  --  6 11   CREATININE 0.64  --  1.03* 1.05*       BNP: No results for input(s): BNP in the last 72 hours. PT/INR: No results for input(s): PROTIME, INR in the last 72 hours. APTT: No results for input(s): APTT in the last 72 hours. CARDIAC ENZYMES: No results for input(s): CKMB, CKMBINDEX, TROPONINI in the last 72 hours. Invalid input(s): CKTOTAL;3  FASTING LIPID PANEL:  Lab Results   Component Value Date    CHOL 131 03/10/2021    HDL 33 (L) 03/10/2021    TRIG 147 03/10/2021     LIVER PROFILE: No results for input(s): AST, ALT, ALB, BILIDIR, BILITOT, ALKPHOS in the last 72 hours.    MICROBIOLOGY:   Lab Results   Component Value Date/Time    CULTURE NO GROWTH 5 DAYS 10/03/2022 02:44 PM       Imaging:    CT HEAD WO CONTRAST    Result Date: 10/3/2022  1. No acute intracranial abnormality. 2. No evidence for significant stenosis or occlusion. 3. Consolidation in the visualized lungs bilaterally that is worse on the right compared to the left. Correlate with report from dedicated CT chest, abdomen, and pelvis for further discussion. XR CHEST PORTABLE    Result Date: 10/5/2022  Interval extubation. Interstitial opacities have increased in the interval, for which developing edema should be considered. XR CHEST PORTABLE    Result Date: 10/3/2022  1. Endotracheal tube tip lies 5 cm above the alejandro. 2. Enteric tube in the stomach with the tip at the level of the proximal to mid gastric body. 3. Stable right mid I a frame elevation resulting in right hemithorax volume loss. 4. There is progressive curvilinear right lung base opacity which may represent progressive atelectasis. 5. Stable perihilar interstitial reticulonodular densities. No acute consolidation or pulmonary edema. CTA HEAD NECK W CONTRAST    Result Date: 10/3/2022  1. No acute intracranial abnormality. 2. No evidence for significant stenosis or occlusion. 3. Consolidation in the visualized lungs bilaterally that is worse on the right compared to the left. Correlate with report from dedicated CT chest, abdomen, and pelvis for further discussion. CT CHEST ABDOMEN PELVIS W CONTRAST    Result Date: 10/3/2022  1. Progressive bibasilar and left lower lobe consolidative change which may represent atelectasis versus superimposed pneumonia. 2. Redemonstration of thoracic and abdominal metastatic disease with mixed change. Most lesions appear stable with a few interval progression (right axillary metastatic lymphadenopathy. Retroperitoneal left periaortic metastatic lymphadenopathy, pelvic presacral cystic mass). 3. Redemonstration of peritoneal carcinomatosis. No significant ascites. 4. Stable blastic skeletal metastasis.  5. Moderate transverse and descending colonic distention with liquid stool. Abrupt transition in the mid sigmoid colon which may relate to malignant partial obstruction or stricture. Mild interval improvement of colonic edema. No evidence of colonic perforation or pneumatosis. MRI BRAIN WO CONTRAST    Result Date: 10/4/2022  1. New subtle focus of T2/FLAIR hyperintensity in the lateral left frontal cortex and subcortical white matter is nonspecific and may reflect sequela of prior ischemia or mild posterior reversal encephalopathy syndrome. Short interval follow-up is recommended. 2. No evidence of acute infarct or intracranial hemorrhage. 3. Stable small foci of right occipital encephalomalacia in keeping with sequela of prior insult. ASSESSMENT & PLAN     ASSESSMENT / PLAN:     Acute metabolic encephalopathy  - Possibly secondary to opioid use versus possible seizure-like activity  - Neurology consulted, continued on LTME, suggests underlying structural defect and increased risk for focal onset seizures. -Mentation improved, ongoing neurology work-up. - Neurology added Seroquel 25 mg at night, will increase it to twice daily this morning     Subclinical seizures  - Neurology consulted recommends Keppra 1 g twice daily, Versed 100 mg twice daily and Lamictal 125 mg twice daily.  - MRI brain shows no acute infarct or hemorrhage and stable small foci of right occipital encephalomalacia.  -Follow-up MRI brain ordered by neurology for PRES versus brain metastasis    Aspiration pneumonia  - Possibly secondary to altered mentation and acute metabolic encephalopathy  - Continue Levaquin  - Supplemental oxygen therapy as needed. 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   - Relapse in last relapse in 2014, lost to follow-up since  - Metastatic relapse again, biopsy confirmed ovarian cancer recurrence with intra-abdominal and splenic involvement as well as lung metastasis  - Currently on active chemotherapy last treatment on 09/29/2022, hematology/oncology continues to follow the patient. Lower extremity lymphedema  - Continue Lasix 20 mg daily    Primary hypertension  - Started on Norvasc 5 mg daily  - Monitor blood pressure    History of DVT/PE  - Continue anticoagulation with Eliquis 5 mg twice daily    DVT ppx : Eliquis 5 mg twice daily  GI ppx: Not indicated    PT/OT: Not indicated  Discharge Planning / SW:  to assist with discharge planning.     Tomas Cisneros MD  Internal Medicine Resident, PGY-1  [address], [place].  10/9/2022, 7:34 AM

## 2022-10-09 NOTE — PROCEDURES
LONG-TERM EEG-VIDEO [id number]    Patient: Lachelle Varghese [facility]  Age: 59 y.o. MRN: [id number]    Referring Physician: No ref. provider found  History: The patient is a 59 y.o. female who presented breakthrough seizure/encephalopathy. This long-term video-EEG monitoring study was performed to determine the nature of the patient's clinical events. The patient is on neuroactive medications.    Lachelle Varghese [facility]   Current Facility-Administered Medications   Medication Dose Route Frequency Provider Last Rate Last Admin    LORazepam (ATIVAN) tablet 1 mg  1 mg Oral Q4H PRN Francisco Tillman MD   1 mg at 10/09/22 0036    amLODIPine (NORVASC) tablet 5 mg  5 mg Oral Daily Loki Castellano MD   5 mg at 10/08/22 0938    0.9 % sodium chloride infusion   IntraVENous Continuous Francisco Tillman  mL/hr at 10/09/22 0435 New Bag at 10/09/22 0435    QUEtiapine (SEROQUEL) tablet 25 mg  25 mg Oral Nightly ESTRELLITA Martinez - CNP   25 mg at 10/08/22 2024    levoFLOXacin (LEVAQUIN) tablet 500 mg  500 mg Oral Daily Devorah Spatz, MD   500 mg at 10/08/22 0836    lacosamide (VIMPAT) tablet 100 mg  100 mg Oral BID ESTRELLITA Martinez - CNP   100 mg at 10/08/22 2024    levETIRAcetam (KEPPRA) tablet 1,000 mg  1,000 mg Oral BID Mesha South, DO   1,000 mg at 10/08/22 2023    famotidine (PEPCID) tablet 20 mg  20 mg Oral BID Shantel Balboa, DO   20 mg at 10/08/22 2024    docusate (COLACE) 50 MG/5ML liquid 100 mg  100 mg Oral Daily Devorah Spatz, MD   100 mg at 10/08/22 0837    apixaban (ELIQUIS) tablet 5 mg  5 mg Oral BID Shantel Balboa, DO   5 mg at 10/08/22 2024    furosemide (LASIX) tablet 20 mg  20 mg Oral Daily Shantel Balboa, DO   20 mg at 10/08/22 0836    lamoTRIgine (LAMICTAL) tablet 125 mg  125 mg Oral BID Shantel Balboa, DO   125 mg at 10/08/22 2024    oxyCODONE-acetaminophen (PERCOCET) 5-325 MG per tablet 1 tablet  1 tablet Oral Q4H PRN Reginald Hoof, DO   1 tablet at 10/08/22 1800    sertraline (ZOLOFT) tablet 100 mg  100 mg Oral Daily Reginald Hoof, DO   100 mg at 10/08/22 0836    melatonin tablet 5 mg  5 mg Oral Nightly Reginald Hoof, DO   5 mg at 10/08/22 2023    morphine (PF) injection 2 mg  2 mg IntraVENous Q4H PRN Reginald Hoof, DO   2 mg at 10/05/22 0228    albuterol (PROVENTIL) nebulizer solution 2.5 mg  2.5 mg Nebulization Q6H PRN Chiara Reyes MD   2.5 mg at 10/08/22 0941    racepinephrine HCl (VAPONEFPRIN) 2.25 % nebulizer solution NEBU 11.25 mg  11.25 mg Nebulization Q3H PRN Soheila Patterson MD        sodium chloride nebulizer 0.9 % solution 3 mL  3 mL Nebulization Q4H PRN Cassandra Patterson MD        sodium chloride flush 0.9 % injection 5-40 mL  5-40 mL IntraVENous 2 times per day Reginald Hoof, DO   10 mL at 10/08/22 2024    sodium chloride flush 0.9 % injection 5-40 mL  5-40 mL IntraVENous PRN Reginald Hoof, DO        0.9 % sodium chloride infusion   IntraVENous PRN Reginald Hoof, DO   Stopped at 10/07/22 0503    ondansetron (ZOFRAN-ODT) disintegrating tablet 4 mg  4 mg Oral Q8H PRN Reginald Hoof, DO        Or    ondansetron [facility]) injection 4 mg  4 mg IntraVENous Q6H PRN Reginald Hoof, DO   4 mg at 10/08/22 1759    polyethylene glycol (GLYCOLAX) packet 17 g  17 g Oral Daily PRN Reginald Hoof, DO        acetaminophen (TYLENOL) tablet 650 mg  650 mg Oral Q6H PRN Reginald Hoof, DO   325 mg at 10/08/22 1800    Or    acetaminophen (TYLENOL) suppository 650 mg  650 mg Rectal Q6H PRN Reginald Hoof, DO        potassium chloride 20 mEq/50 mL IVPB (Central Line)  20 mEq IntraVENous PRN Reginald Hoof, DO   Stopped at 10/05/22 1550    Or    potassium chloride 10 mEq/100 mL IVPB (Peripheral Line)  10 mEq IntraVENous PRN Reginald Hoof,  mL/hr at 10/07/22 1159 10 mEq at 10/07/22 1159     Technical Description: This is a 21-channel digital EEG recording with time-locked video.  Electrodes were placed in accordance with the 10-20 International System of Electrode Placement. Single lead EKG monitoring was included. Baseline EEG Recording:  A formal baseline EEG recording was not obtained. Day 1 - 10/4/22, starting at 18:32    Interictal EEG Samples: The background activity over the right hemisphere consisted of 3 to 7 Hz of polymorphic theta activity of 20-30 µV. The activity over the left hemisphere was relatively less well-modulated and consisted of 4 to 5 Hz of polymorphic delta and theta slowing. This asymmetry between the 2 hemispheres was continuous. Frequent left central parietal sharp waves were seen, at times occurring in periodic pattern, consistent with lateralized periodic discharges. The EKG channel revealed no abnormalities. Ictal EEG Recording / Patient Events: At 20 1:37 PM, there is a burst of rhythmic 3 to 4 Hz of delta activity maximal over the left hemisphere, which then gradually increased in amplitude, became more rhythmic and involve the right hemisphere, ending at 9:38 PM.    Summary: During the recording, the patient had 1 subclinical seizure lasting about 40 seconds originating from the left hemisphere. The interictal EEG was abnormal due to diffuse polymorphic theta slowing suggesting mild to moderate encephalopathy. Continuous left hemispheric slowing suggested underlying structural defect. Frequent left central parietal sharp waves and lateralized periodic discharges conferred an increased risk for focal onset seizures. Monitoring was continued in order to record the patient's typical events. The EKG channel revealed no abnormalities. Day 2 - 10/5/22    Interictal EEG Samples: Interictal EEG was unchanged from yesterday.     Ictal EEG Recording / Patient Events: During this period the patient had total of 5 seizures at 2:25 AM, 3 AM, 3:27 AM, 4:02 AM, 4:51 AM, 7:38 AM, 8:16 AM, 8:46 AM, 9:05 AM, 9:57 AM, 6:20 PM.  During some of the seizures, the patient noted to have left hand flapping movement and at times having left head version    Summary: During the recording, the patient had 11 electroclinical seizures with left hand flapping and left head deviation. Electrographically, these seizures were similar to yesterday seizures. The interictal EEG was abnormal due to diffuse polymorphic theta slowing suggesting mild to moderate encephalopathy. Continuous left hemispheric slowing suggested underlying structural defect. Frequent left central parietal sharp waves and lateralized periodic discharges (LPDs) conferred an increased risk for focal onset seizures. Monitoring was continued in order to record the patient's typical events. The EKG channel revealed no abnormalities. Day 3 - 10/6/22,    Interictal EEG Samples: Interictal EEG was unchanged from yesterday. Ictal EEG Recording / Patient Events: During this period the patient had subclinical seizures that 4:59 AM, 5:37 AM, 6:10 AM, 8:25 AM, 12:35 PM, 3:20 PM     Summary: During the recording, the patient had 6 left hemispheric onset seizures. Electrographically, these seizures were similar to yesterday seizures. The interictal EEG was abnormal due to diffuse polymorphic theta slowing suggesting mild to moderate encephalopathy. Continuous left hemispheric slowing suggested underlying structural defect. Frequent left central parietal sharp waves and lateralized periodic discharges (LPDs) conferred an increased risk for focal onset seizures. Monitoring was continued in order to record the patient's typical events. The EKG channel revealed no abnormalities. Day 4 - 10/7/22    Interictal EEG Samples: Interictal EEG was unchanged from yesterday. Ictal EEG Recording / Patient Events: During this period the patient had no events or seizures. Summary: During this day of recording no events were recorded. Continuous left hemispheric slowing suggested underlying structural defect.   Occasional left central parietal sharp waves and lateralized periodic discharges (LPDs) conferred an increased risk for focal onset seizures. Monitoring was continued in order to record the patient's typical events. The EKG channel revealed no abnormalities. Day 5 - 10/8/22    Interictal EEG Samples: Interictal EEG was unchanged from yesterday. Ictal EEG Recording / Patient Events: During this period the patient had no events or seizures. Summary: During this day of recording no events were recorded. Continuous left hemispheric slowing suggested underlying structural defect. Occasional left central parietal sharp waves and lateralized periodic discharges (LPDs) conferred an increased risk for focal onset seizures. Monitoring was continued in order to record the patient's typical events. The EKG channel revealed no abnormalities. Day 6 - 10/9/22 reviewed through 7 am    Interictal EEG Samples: Previously seen left sided LPDs were not seen on today's recoding. Ictal EEG Recording / Patient Events: During this period the patient had no events or seizures. Summary: During this day of recording no events were recorded. Continuous left hemispheric slowing suggested underlying structural defect. Previously seen left sided LPDs were not seen on today's recording. Monitoring was continued in order to record the patient's typical events. The EKG channel revealed no abnormalities. Itz Olmedo MD  Diplomate, American Board of Psychiatry and Neurology  Diplomate, American Board of Clinical Neurophysiology  Diplomate, American Board of Epilepsy       Please note this is a preliminary report and updated daily. The final report will have a summary of behavior and electrographic findings with clinical correlation.

## 2022-10-09 NOTE — PLAN OF CARE
Problem: Respiratory - Adult  Goal: Achieves optimal ventilation and oxygenation  Outcome: Progressing     Problem: Discharge Planning  Goal: Discharge to home or other facility with appropriate resources  Outcome: Progressing     Problem: Safety - Adult  Goal: Free from fall injury  10/9/2022 0526 by Alejandro Barbosa RN  Outcome: Progressing  10/8/2022 1529 by Prem Pierce RN  Outcome: Progressing     Problem: ABCDS Injury Assessment  Goal: Absence of physical injury  Outcome: Progressing     Problem: Skin/Tissue Integrity  Goal: Absence of new skin breakdown  Description: 1. Monitor for areas of redness and/or skin breakdown  2. Assess vascular access sites hourly  3. Every 4-6 hours minimum:  Change oxygen saturation probe site  4. Every 4-6 hours:  If on nasal continuous positive airway pressure, respiratory therapy assess nares and determine need for appliance change or resting period.   Outcome: Progressing     Problem: Pain  Goal: Verbalizes/displays adequate comfort level or baseline comfort level  Outcome: Progressing     Problem: Chronic Conditions and Co-morbidities  Goal: Patient's chronic conditions and co-morbidity symptoms are monitored and maintained or improved  Outcome: Progressing

## 2022-10-09 NOTE — PROGRESS NOTES
Today's Date: 10/9/2022  Patient Name: Tyra Weeks  Date of admission: 10/3/2022  1:07 PM  Patient's age: 59 y.o., 1965  Admission Dx: Septicemia ([facility] [address] [A41.9]  Altered mental status, unspecified altered mental status type [R41.82]  AMS (altered mental status) [R41.82]      Requesting Physician: No admitting provider for patient encounter. CHIEF COMPLAINT: Altered mental status. Sepsis. Recurrent metastatic ovarian cancer. History Obtained From:  patient, electronic medical record    Interval history:  Patient seen and examined bedside. Patient's blood pressure stable. Afebrile. Currently saturating well on room air. Patient states she feels better but she is still having hallucinations. HISTORY OF PRESENT ILLNESS:      The patient is a 59 y.o.  female who is admitted to the hospital for further management of altered mental status. She had increasing shortness of breath. She was admitted for sepsis. She was intubated for airway protection. Patient was admitted on October 3. She was extubated yesterday. Were consulted today because of her history of recurrent ovarian cancer. Patient is mentally alert and oriented. No respiratory distress. No fever. She is not in pain. Brief oncologic history:  DIAGNOSIS:       Recurrent ovarian cancer. Original diagnosis 2005 with multiple recurrences. Diffuse metastasis. CURRENT THERAPY:         Doxil/ Avastin started 9/18/2020. Avastin was discontinued due to recent GI bleeding. Status post recent vascular embolization  S/p seizure disorder. Gemzar started 2/26/2021. Interrupted due to hospitalization and problem with compliance. Evidence of disease progression on CT scan 2/22/22  Added Carboplatin to Gemzar March 2022        BRIEF CASE HISTORY:      Ms. Alvarado Weinstein is a very pleasant 59 y.o. female with history of multiple co morbidities as listed.   The patient seen in consultation for ovarian cancer with multiple recurrences. 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 She had surgical debulking and she had systemic treatment with Taxol and carboplatin for 6 cycles. Patient was in remission for about 2 years. She had a relapse in 2007 and treated with topotecan with good results. Patient relapsed again in 2008 and was treated with Taxol carboplatin. After 3 cycles of systemic chemotherapy she had problems with carboplatin so she finished 3 more cycles with Taxol. She did well again for 2 to 3 years until she had a relapse in 2012 and she had intraperitoneal chemotherapy treatment with Taxol. Patient was last seen by her oncologist in 2014 at which time she had relatively stable disease with normal tumor marker and no significant abnormal images. Patient moved to [place] after that and she was lost for oncology follow-ups. Patient was recently evaluated again here in [facility] because of abdominal discomfort. Re imaging confirmed metastatic relapse. Biopsy confirmed ovarian cancer recurrence. Scan showed extensive intraabdominal and splenic involvement and lung mets. She has no symptoms at the present time. Patient denies smoking or alcohol drinking.l       Past Medical History:   has a past medical history of Anemia, Bleeding, Cervical cancer ([address] Depression, Diabetes mellitus ([address] GERD (gastroesophageal reflux disease), Hx of blood clots, Hypertension, Metastatic cancer ([address] Ovarian cancer ([address] Post chemo evaluation, PRES (posterior reversible encephalopathy syndrome), and Splenic lesion. Past Surgical History:   has a past surgical history that includes Hysterectomy, total abdominal; Port Surgery; Tonsillectomy; IR PORT PLACEMENT > 5 YEARS (08/24/2020); Anus surgery; Abscess Drainage (2013); colectomy (03/2013); IR EMBOLIZATION HEMORRHAGE (10/05/2020); and Cardiac catheterization.    Family History: family history includes Alcohol Abuse in her mother; Cirrhosis in her mother. Social History:   reports that she has been smoking cigarettes. She has a 20.00 pack-year smoking history. She has never used smokeless tobacco. She reports that she does not currently use alcohol. She reports that she does not use drugs. Medications:    Prior to Admission medications    Medication Sig Start Date End Date Taking? Authorizing Provider   STIMULANT LAXATIVE 8.6-50 MG per tablet TAKE 2 TABLETS BY MOUTH NIGHTLY. 10/6/22   Shanda Medhbaronur, DO   morphine (MS CONTIN) 15 MG extended release tablet TAKE 1 TABLET BY MOUTH 2 TIMES DAILY FOR 30 DAYS. 9/28/22 10/28/22  Nusrat Mccurdy MD   lamoTRIgine (LAMICTAL) 25 MG tablet Take 5 tablets by mouth 2 times daily 9/22/22   Shanda Medthad, DO   LORazepam (ATIVAN) 1 MG tablet TAKE 1 TABLET BY MOUTH EVERY 8 HOURS AS NEEDED FOR ANXIETY FOR UP TO 30 DAYS. 9/21/22 10/21/22  Nusrat Mccurdy MD   morphine (MS CONTIN) 30 MG extended release tablet TAKE 1 TABLET BY MOUTH 2 TIMES DAILY FOR 30 DAYS. 9/21/22 10/21/22  Nusrat Mccurdy MD   sertraline (ZOLOFT) 100 MG tablet TAKE 1 TABLET BY MOUTH DAILY 9/21/22 10/21/22  Nusrat Mccurdy MD   oxyCODONE-acetaminophen (PERCOCET) 5-325 MG per tablet Take 1 tablet by mouth every 4 hours as needed for Pain for up to 30 days.  9/13/22 10/13/22  Krzysztof Bhatia MD   FEROSUL 325 (65 Fe) MG tablet TAKE 1 TABLET BY MOUTH ONCE DAILY WITH BREAKFAST 9/9/22   Krzysztof Bhatia MD   levETIRAcetam (KEPPRA) 1000 MG tablet TAKE 1 TABLET (1,000 MG TOTAL) BY MOUTH IN THE MORNING AND 1 TABLET (1,000 MG TOTAL) BEFORE BEDTIME. 9/9/22   Krzysztof Bhatia MD   FEROSUL 325 (65 Fe) MG tablet TAKE 1 TABLET (325 MG TOTAL) BY MOUTH DAILY WITH BREAKFAST. 9/8/22   Shanda MedDO thad   pantoprazole (PROTONIX) 40 MG tablet Take 1 tablet by mouth daily 9/1/22   Krzysztof Bhatia MD   Lactobacillus (ACIDOPHILUS) CAPS capsule TAKE 1 TABLET BY MOUTH ONCE DAILY IN THE MORNING AND 1 TABLET BEFORE BEDTIME 8/31/22   Shanda Medhkour, DO   furosemide (LASIX) 20 MG tablet TAKE 1 TABLET (20 MG TOTAL) BY MOUTH DAILY.  8/29/22   Shanda Medhkour, DO   dicyclomine (BENTYL) 20 MG tablet TAKE 1 TABLET (20 MG TOTAL) BY MOUTH IN THE MORNING AND 1 TABLET (20 MG TOTAL) BEFORE BEDTIME. 8/29/22   Shanda Medhkour, DO   potassium chloride (MICRO-K) 10 MEQ extended release capsule TAKE 1 CAPSULE (10 MEQ TOTAL) BY MOUTH IN THE MORNING. 8/29/22   Shanda Medhkour, DO   Handicap Placard MISC 5 years 7/19/22   Joyce Rinne Medhkour, DO   loperamide (IMODIUM) 2 MG capsule Take 2 mg by mouth 4 times daily as needed for Diarrhea    Historical Provider, MD   ondansetron (ZOFRAN-ODT) 4 MG disintegrating tablet Take 1 tablet by mouth every 8 hours as needed for Nausea or Vomiting 5/13/22   Damion Thorne MD   senna (SENOKOT) 8.6 MG tablet Take 1 tablet by mouth 2 times daily 5/2/22 5/2/23  Alexandrea Roche MD   melatonin 5 MG TABS tablet Take 1 tablet by mouth daily 4/13/22   Shanda Medhkour, DO   benzonatate (TESSALON PERLES) 100 MG capsule Take 1 capsule by mouth 3 times daily as needed for Cough 4/13/22   Shanda Medhkour, DO   apixaban (ELIQUIS) 5 MG TABS tablet Take 1 tablet by mouth 2 times daily 4/8/22   Damion Thorne MD   hydrocortisone (ANUSOL-HC) 2.5 % CREA rectal cream Apply on affected area twice daily 2/28/22   Carson Scanlon MD     Current Facility-Administered Medications   Medication Dose Route Frequency Provider Last Rate Last Admin    [START ON 10/10/2022] potassium chloride (KLOR-CON M) extended release tablet 10 mEq  10 mEq Oral BID  Mayte Rivera MD        sodium chloride flush 0.9 % injection 10 mL  10 mL IntraVENous PRN Everton Tesfaye DO        QUEtiapine (SEROQUEL) tablet 25 mg  25 mg Oral BID Mayte Rivera MD   25 mg at 10/09/22 1312    LORazepam (ATIVAN) tablet 1 mg  1 mg Oral Q4H PRN Mayte Rivera MD   1 mg at 10/09/22 1316    amLODIPine (NORVASC) tablet 5 mg  5 mg Oral Daily Christine Pineda MD   5 mg at 10/09/22 0840    levoFLOXacin (LEVAQUIN) tablet 500 mg  500 mg Oral Daily Sisi German MD   500 mg at 10/09/22 0839    lacosamide (VIMPAT) tablet 100 mg  100 mg Oral BID Tha Porras, APRN - CNP   100 mg at 10/09/22 0840    levETIRAcetam (KEPPRA) tablet 1,000 mg  1,000 mg Oral BID Manuel Addi, DO   1,000 mg at 10/09/22 0839    famotidine (PEPCID) tablet 20 mg  20 mg Oral BID Ar Malady, DO   20 mg at 10/09/22 0840    docusate (COLACE) 50 MG/5ML liquid 100 mg  100 mg Oral Daily Sisi German MD   100 mg at 10/08/22 0837    apixaban (ELIQUIS) tablet 5 mg  5 mg Oral BID Ar Mustafa, DO   5 mg at 10/09/22 0840    furosemide (LASIX) tablet 20 mg  20 mg Oral Daily Ar Mustafa, DO   20 mg at 10/09/22 0840    lamoTRIgine (LAMICTAL) tablet 125 mg  125 mg Oral BID Ar Malady, DO   125 mg at 10/09/22 0839    oxyCODONE-acetaminophen (PERCOCET) 5-325 MG per tablet 1 tablet  1 tablet Oral Q4H PRN Ar Mustafa, DO   1 tablet at 10/09/22 0850    sertraline (ZOLOFT) tablet 100 mg  100 mg Oral Daily Ar Mustafa, DO   100 mg at 10/09/22 0839    melatonin tablet 5 mg  5 mg Oral Nightly Ar Malady, DO   5 mg at 10/08/22 2023    morphine (PF) injection 2 mg  2 mg IntraVENous Q4H PRN Ar Mustafa, DO   2 mg at 10/05/22 0228    albuterol (PROVENTIL) nebulizer solution 2.5 mg  2.5 mg Nebulization Q6H PRN Manuel Guardado MD   2.5 mg at 10/08/22 0941    racepinephrine HCl (VAPONEFPRIN) 2.25 % nebulizer solution NEBU 11.25 mg  11.25 mg Nebulization Q3H PRN Soheila Patterson MD        sodium chloride nebulizer 0.9 % solution 3 mL  3 mL Nebulization Q4H PRN Preston Patterson MD        sodium chloride flush 0.9 % injection 5-40 mL  5-40 mL IntraVENous 2 times per day Ar Mustafa DO   10 mL at 10/09/22 0851    sodium chloride flush 0.9 % injection 5-40 mL  5-40 mL IntraVENous PRN Ar Mustafa DO        0.9 % sodium chloride infusion   IntraVENous PRN Ar Mustafa DO   Stopped at 10/07/22 0503    ondansetron (ZOFRAN-ODT) disintegrating tablet 4 mg  4 mg Oral Q8H PRN Soy Conn, DO        Or    ondansetron [facility] injection 4 mg  4 mg IntraVENous Q6H PRN Soy Conn, DO   4 mg at 10/08/22 1759    polyethylene glycol (GLYCOLAX) packet 17 g  17 g Oral Daily PRN Soy Conn, DO        acetaminophen (TYLENOL) tablet 650 mg  650 mg Oral Q6H PRN Soy Conn, DO   325 mg at 10/08/22 1800    Or    acetaminophen (TYLENOL) suppository 650 mg  650 mg Rectal Q6H PRN Soy Conn, DO           Allergies:  Carboplatin and Ceftriaxone    REVIEW OF SYSTEMS:      General: Positive for weakness and fatigue. Positive for weight loss and decreased appetite. No fever or chills. Eyes: No blurred vision, eye pain or double vision. Ears: No hearing problems or drainage. No tinnitus. Throat: No sore throat, problems with swallowing or dysphagia. Respiratory: No cough, sputum or hemoptysis. No shortness of breath. No pleuritic chest pain. Cardiovascular: No chest pain, orthopnea or PND. No lower extremity edema. No palpitation. Gastrointestinal: No problems with swallowing. No abdominal pain or bloating. No nausea or vomiting. No diarrhea or constipation. No GI bleeding. Genitourinary: No dysuria, hematuria, frequency or urgency. Musculoskeletal: No muscle aches or pains. No limitation of movement. No back pain. No gait disturbance, No joint complaints. Dermatologic: No skin rashes or pruritus. No skin lesions or discolorations. Psychiatric: As above. Hematologic: No history of bleeding tendency. No bruises or ecchymosis. No history of clotting problems. Infectious disease: No fever, chills or frequent infections. Endocrine: No polydipsia or polyuria. No temperature intolerance. Neurologic: No headaches or dizziness. No weakness or numbness of the extremities. No changes in balance, coordination,  memory, mentation, behavior.    Allergic/Immunologic: No nasal congestion or hives. No repeated infections. PHYSICAL EXAM:      BP (!) 141/81   Pulse 100   Temp 97 °F (36.1 °C) (Oral)   Resp 18   Ht 5' 5\" (1.651 m)   Wt 151 lb 3.8 oz (68.6 kg)   SpO2 91%   BMI 25.17 kg/m²    Temp (24hrs), Av.1 °F (36.7 °C), Min:97 °F (36.1 °C), Max:99.2 °F (37.3 °C)      General appearance - not in pain or distress  Mental status - alert and oriented  Eyes - pupils equal and reactive, extraocular eye movements intact  Ears - bilateral TM's and external ear canals normal  Nose - normal and patent, no erythema, discharge or polyps  Mouth - mucous membranes moist, pharynx normal without lesions  Neck - supple, no significant adenopathy  Lymphatics - no palpable lymphadenopathy, no hepatosplenomegaly  Chest - clear to auscultation, no wheezes, rales or rhonchi, symmetric air entry  Heart - normal rate, regular rhythm, normal S1, S2, no murmurs, rubs, clicks or gallops  Abdomen - soft, nontender, nondistended, no masses or organomegaly  Neurological - alert, oriented, normal speech, no focal findings or movement disorder noted  Musculoskeletal - no joint tenderness, deformity or swelling  Extremities - peripheral pulses normal, no pedal edema, no clubbing or cyanosis  Skin - normal coloration and turgor, no rashes, no suspicious skin lesions noted           DATA:      Labs:       CBC:   Recent Labs     10/08/22  0412 10/09/22  0422   WBC 7.7 8.3   HGB 12.6 12.4   HCT 39.6 37.9   PLT See Reflexed IPF Result 146       BMP:   Recent Labs     10/08/22  0412 10/09/22  0422    141   K 3.5* 3.5*   CO2 21 21   BUN 6 11   CREATININE 1.03* 1.05*   LABGLOM >60 >60   GLUCOSE 107* 116*       PT/INR: No results for input(s): PROTIME, INR in the last 72 hours. APTT:No results for input(s): APTT in the last 72 hours. LIVER PROFILE:No results for input(s): AST, ALT, LABALBU in the last 72 hours.   MRI BRAIN W WO CONTRAST  Narrative: EXAMINATION:  MRI OF THE BRAIN WITHOUT AND WITH CONTRAST  10/9/2022 9:35 am    TECHNIQUE:  Multiplanar multisequence MRI of the head/brain was performed without and  with the administration of intravenous contrast.    COMPARISON:  Brain MRI done 10/04/2022 and 02/24/2022. HISTORY:  ORDERING SYSTEM PROVIDED HISTORY: revaluation of parenchyma eveluate for PRES  or other lesions  TECHNOLOGIST PROVIDED HISTORY:  revaluation of parenchyma eveluate for PRES or other lesions  Reason for Exam: revaluation of parenchyma, PRES or other lesions    FINDINGS:  INTRACRANIAL STRUCTURES/VENTRICLES: Patchy cortical/subcortical FLAIR  hyperintense signal in the right greater than left occipital lobes is similar  to the brain MRI done October 4, 2022 and 02/24/2022. Small focal area of  cortical/subcortical FLAIR hyperintense signal in the left posterior frontal  lobe is similar compared to brain MRI done October 4, 2022 but new compared  to brain MRI done 02/24/2022. These areas of FLAIR hyperintense signal  demonstrate no correlating postcontrast enhancement, diffusion restriction or  gradient blooming. There is no acute infarct. No mass effect or midline shift. No evidence of an  acute intracranial hemorrhage. Generalized cerebral and cerebellar volume  loss. No deonte hydrocephalus. The sellar/suprasellar regions appear  unremarkable. The normal signal voids within the major intracranial vessels  appear maintained. No abnormal focus of enhancement is seen within the  brain. The axial FLAIR images demonstrate mild bilateral periventricular and  deep white matter signal hyperintensities which are nonspecific but commonly  seen in the setting of chronic small vessel ischemic disease. ORBITS: The visualized portion of the orbits demonstrate no acute abnormality. SINUSES: The visualized paranasal sinuses and mastoid air cells demonstrate  no acute abnormality.     BONES/SOFT TISSUES: The bone marrow signal intensity appears normal. The soft  tissues demonstrate no acute abnormality. Impression: 1. Overall, findings are similar compared to brain MRI done October 4, 2022  with redemonstration of patchy areas of FLAIR hyperintense signal in the left  posterior frontal lobe and right greater than left occipital lobes which may  represent small old infarcts. 2.  No acute stroke, intracranial hemorrhage or abnormal enhancement. 3.  Mild chronic small vessel ischemic disease. IMPRESSION:    Primary Problem  Septicemia [facility])    Active Hospital Problems    Diagnosis Date Noted    Septicemia ([address] [A41.9] 10/03/2022     Priority: High    Metabolic encephalopathy [T74.59]     Breakthrough seizure ([address] Jossy Smith     AMS (altered mental status) [R41.82] 02/20/2022    Gastroesophageal reflux disease [K21.9] 01/08/2022    Iron deficiency anemia secondary to inadequate dietary iron intake [D50.8] 05/28/2021    Chronic deep vein thrombosis (DVT) of femoral vein of left lower extremity ([address] [I82.512] 03/11/2021    Cancer, metastatic to bone ([address] [C79.51] 01/28/2021    Seizure disorder, nonconvulsive, with status epilepticus ([address] [G40.901] 01/04/2021    Acute encephalopathy [G93.40]     Seizure ([address] [R56.9] 10/21/2020    Malignant neoplasm of ovary ([address] [C56.9] 08/17/2020   Altered mental status. Sepsis. Recurrent metastatic ovarian cancer on chemotherapy treatment. RECOMMENDATIONS:  Records and labs and images were reviewed and discussed with the patient. Patient was treated for sepsis and she was extubated successfully. She had metabolic encephalopathy which has resolved. MRI findings similar to previous MRI. Patient is currently on active chemotherapy treatment for ovarian cancer. Last treatment was September 29, 2022. We will monitor labs closely. Further management for ovarian cancer will be as outpatient. Patient's questions were answered to the best of her satisfaction and she verbalized full understanding and agreement.   Thank you for allowing us to participate in the care of this pleasant patient. Discussed with patient and Nurse. Nieves Spurling, MD,     Attending Physician Statement   I have discussed the care of this patient, including pertinent history and exam findings, with the resident. I have seen and examined the patient and the key elements of all parts of the encounter have been performed by me. I agree with the assessment, plan and orders as documented by the resident and with changes made to the note.     Monica Arreola MD  Hematology/Oncology    Cell: [phone]

## 2022-10-09 NOTE — PROGRESS NOTES
NEUROLOGY INPATIENT PROGRESS NOTE    10/9/2022         Current Exam:     Chart reviewed. Discussed with RN. LTME overnight with no seizures. Overall she is gradually improving but does report continued auditory hallucinations. No further visual hallucinations. Seroquel has been increased by primary team. Repeat MRI brain official read pending but no evidence of PRES or enhancement on review. Brief History:    Kevin Mccall is a  59 y.o. female with H/O prior PRES with new onset seizure disorder in October 2020, recurrent ovarian cancer with peritoneal and lung mets, intra-abdominal bleeding due to splenic mass with GI infiltration status post embolization in 2020, HTN, DM, and chronic DVT, who was admitted on 10/3/2022 with acute onset altered mentation. Patient's daughter reported when they went to wake her she was difficult to arouse and another daughter saw her on the bedroom camera vomiting, having incontinence and behaving in an erratic way. Reportedly seizure activity was witnessed. Patient has had multiple hospitalizations for breakthrough seizures with negative work-ups and AED adjustments. She had been on Keppra 1 g twice daily and Lamictal 125 mg twice daily prior to arrival.  Neurology was consulted on 10/4 for ongoing disorientation and agitation following extubation on 10/4. She was started on LTME showing evidence of multiple seizures. Vimpat was added to her regimen. No current facility-administered medications on file prior to encounter. Current Outpatient Medications on File Prior to Encounter   Medication Sig Dispense Refill    morphine (MS CONTIN) 15 MG extended release tablet TAKE 1 TABLET BY MOUTH 2 TIMES DAILY FOR 30 DAYS. 60 tablet 0    lamoTRIgine (LAMICTAL) 25 MG tablet Take 5 tablets by mouth 2 times daily 300 tablet 2    LORazepam (ATIVAN) 1 MG tablet TAKE 1 TABLET BY MOUTH EVERY 8 HOURS AS NEEDED FOR ANXIETY FOR UP TO 30 DAYS.  90 tablet 0    morphine (MS CONTIN) 30 MG extended release tablet TAKE 1 TABLET BY MOUTH 2 TIMES DAILY FOR 30 DAYS. 60 tablet 0    sertraline (ZOLOFT) 100 MG tablet TAKE 1 TABLET BY MOUTH DAILY 30 tablet 4    oxyCODONE-acetaminophen (PERCOCET) 5-325 MG per tablet Take 1 tablet by mouth every 4 hours as needed for Pain for up to 30 days. 180 tablet 0    FEROSUL 325 (65 Fe) MG tablet TAKE 1 TABLET BY MOUTH ONCE DAILY WITH BREAKFAST 30 tablet 2    levETIRAcetam (KEPPRA) 1000 MG tablet TAKE 1 TABLET (1,000 MG TOTAL) BY MOUTH IN THE MORNING AND 1 TABLET (1,000 MG TOTAL) BEFORE BEDTIME. 60 tablet 3    FEROSUL 325 (65 Fe) MG tablet TAKE 1 TABLET (325 MG TOTAL) BY MOUTH DAILY WITH BREAKFAST. 30 tablet 2    pantoprazole (PROTONIX) 40 MG tablet Take 1 tablet by mouth daily 30 tablet 3    Lactobacillus (ACIDOPHILUS) CAPS capsule TAKE 1 TABLET BY MOUTH ONCE DAILY IN THE MORNING AND 1 TABLET BEFORE BEDTIME 60 capsule 5    furosemide (LASIX) 20 MG tablet TAKE 1 TABLET (20 MG TOTAL) BY MOUTH DAILY. 30 tablet 3    dicyclomine (BENTYL) 20 MG tablet TAKE 1 TABLET (20 MG TOTAL) BY MOUTH IN THE MORNING AND 1 TABLET (20 MG TOTAL) BEFORE BEDTIME. 60 tablet 3    potassium chloride (MICRO-K) 10 MEQ extended release capsule TAKE 1 CAPSULE (10 MEQ TOTAL) BY MOUTH IN THE MORNING.  30 capsule 3    Handicap Placard MISC 5 years 1 each 0    loperamide (IMODIUM) 2 MG capsule Take 2 mg by mouth 4 times daily as needed for Diarrhea      ondansetron (ZOFRAN-ODT) 4 MG disintegrating tablet Take 1 tablet by mouth every 8 hours as needed for Nausea or Vomiting 60 tablet 2    senna (SENOKOT) 8.6 MG tablet Take 1 tablet by mouth 2 times daily 60 tablet 11    melatonin 5 MG TABS tablet Take 1 tablet by mouth daily 30 tablet 3    benzonatate (TESSALON PERLES) 100 MG capsule Take 1 capsule by mouth 3 times daily as needed for Cough 30 capsule 1    apixaban (ELIQUIS) 5 MG TABS tablet Take 1 tablet by mouth 2 times daily 60 tablet 5    hydrocortisone (ANUSOL-HC) 2.5 % CREA rectal cream Apply on affected area twice daily 1 each 1       Allergies: Iliana Méndez is allergic to carboplatin and ceftriaxone. Past Medical History:   Diagnosis Date    Anemia     Bleeding 10/2020    intra-abdominal bleeding -due to splenic mass with GI infiltration. Status post embolization    Cervical cancer (HCC)     Depression     Diabetes mellitus ([address]     GERD (gastroesophageal reflux disease)     Hx of blood clots     Hypertension     Metastatic cancer ([address] 10/2020    extensive intraabdominal and splenic involvement and lung mets. Ovarian cancer ([address]     low grade serous ovarian carcinoma    Post chemo evaluation     2007: Chemo via med onc (Dr. Steve Herrera), 2008: Dalila Micheal due to rising CA-125, 2013: intraperitoneal chemo,12/2015: Ca125 - 25     PRES (posterior reversible encephalopathy syndrome)     Splenic lesion        Past Surgical History:   Procedure Laterality Date    ABSCESS DRAINAGE  2013    Franca rectal    ANUS SURGERY      ANAL FISSURECTOMY    CARDIAC CATHETERIZATION      COLECTOMY  03/2013    ex lap, tumor debulking, transverse colectomy w reanastamosis, subgastric omentectomy, intraperitoneal port placement    HYSTERECTOMY, TOTAL ABDOMINAL (CERVIX REMOVED)      IR EMBOLIZATION HEMORRHAGE  10/05/2020    intra-abdominal bleeding -due to splenic mass with GI infiltration. Status post embolization [employer] interlock coils x7. mri condtional 3t ok, safe immediately post implant. IR PORT PLACEMENT EQUAL OR GREATER THAN 5 YEARS  08/24/2020    IR PORT PLACEMENT EQUAL OR GREATER THAN 5 YEARS 8/24/2020 Arias Waller MD [facility]    PORT SURGERY      IP Port    TONSILLECTOMY         Social History: Iliana Méndez  reports that she has been smoking cigarettes. She has a 20.00 pack-year smoking history. She has never used smokeless tobacco. She reports that she does not currently use alcohol. She reports that she does not use drugs.     Family History   Problem Relation Age of Onset    Alcohol Abuse Mother     Cirrhosis Mother        Objective:   /75   Pulse 91   Temp 98.4 °F (36.9 °C) (Oral)   Resp 18   Ht 5' 5\" (1.651 m)   Wt 151 lb 3.8 oz (68.6 kg)   SpO2 91%   BMI 25.17 kg/m²     Blood pressure range: Systolic (78RHV), EOJ:844 , Min:135 , RFE:384   ; Diastolic (02JWH), NYV:98, Min:71, Max:81      Review of Systems:  Constitutional  Negative for fever and chills    HEENT  Negative for ear discharge, ear pain, nosebleed    Eyes  Negative for photophobia, pain and discharge    Respiratory  Negative for hemoptysis and sputum    Cardiovascular  Negative for orthopnea, claudication and PND    Gastrointestinal  Negative for abdominal pain, diarrhea, blood in stool    Musculoskeletal  Negative for joint pain, negative for myalgia    Skin  Negative for rash or itching    Endo/heme/allergies  Negative for polydipsia, environmental allergy    Psychiatric/behavioral  Negative for suicidal ideation. Patient is not anxious        NEUROLOGIC EXAMINATION  GENERAL  Appears comfortable and in no distress   HEENT  NC/ AT   NECK  Supple   MENTAL STATUS:  Alert, oriented to person, place, states year is 2023, normal speech, normal language, + hallucination. Intact naming. Follows simple commands.    CRANIAL NERVES: II     -      Visual fields intact to confrontation  III,IV,VI -  EOMs full, no afferent defect, no JOSE A, no ptosis  V     -     Normal facial sensation  VII    -     Normal facial symmetry  VIII   -     Intact hearing  IX,X -     Symmetrical palate  XI    -     Symmetrical shoulder shrug  XII   -     Midline tongue, no atrophy    MOTOR FUNCTION:  Lifts all limbs antigravity with normal bulk, normal tone and no involuntary movements, no tremor   SENSORY FUNCTION:  Normal touch, normal pin   CEREBELLAR FUNCTION:  Intact fine motor control over upper limbs   REFLEX FUNCTION:  Symmetric, no perverted reflex, no Babinski sign   STATION and GAIT  Not tested       Data:    Lab Results: CBC:   Recent Labs     10/07/22  0443 10/08/22  0412 10/09/22  0422   WBC 8.8 7.7 8.3   HGB 12.3 12.6 12.4   PLT See Reflexed IPF Result See Reflexed IPF Result 146     BMP:    Recent Labs     10/07/22  0443 10/07/22  1429 10/08/22  0412 10/09/22  0422   *  --  137 141   K 3.0* 3.9 3.5* 3.5*   *  --  103 106   CO2 24  --  21 21   BUN 3*  --  6 11   CREATININE 0.64  --  1.03* 1.05*   GLUCOSE 126*  --  107* 116*         Lab Results   Component Value Date    CHOL 131 03/10/2021    LDLCHOLESTEROL 69 03/10/2021    HDL 33 (L) 03/10/2021    TRIG 147 03/10/2021    ALT 9 10/03/2022    AST 18 10/03/2022    TSH 2.93 03/09/2021    INR 1.2 05/03/2022    LABA1C 5.2 04/13/2022    [id number] 06/04/2022           Diagnostic data reviewed:  CT HEAD (10/3/2022): No acute intracranial abnormality        MRI BRAIN (10/4/2022):   1. New subtle focus of T2/FLAIR hyperintensity in lateral L frontal cortex & subcortical white matter    is nonspecific and may reflect sequela of prior infarct or mild PRES. 2. Stable small foci of R occipital encephalomalacia; sequela of prior insult. MRI BRAIN W/WO: Held for now due to mild improvement in mentation        CTA HEAD & NECK (10/3/2022): No LVO        ECHO (2/22/2022): EF 55%       LTME (10/4 -   Day 1: During the recording, the patient had 1 subclinical seizure lasting about 40 seconds originating from L hemisphere. The interictal EEG was abnormal due to diffuse polymorphic theta slowing suggesting mild to moderate encephalopathy. Continuous L hemispheric slowing suggested underlying structural defect. frequent L central parietal sharp waves and lateralized periodic discharges conferred an increased risk for focal onset seizures     Day 2: During the recording, the patient had 11 electroclinical seizures with L hand flapping & L head deviation. Electrographically, these seizures were similar to yesterday seizures.  The interictal EEG was abnormal due to diffuse polymorphic theta slowing suggesting mild to moderate encephalopathy. Continuous L hemispheric slowing suggested underlying structural defect. Frequent L central parietal sharp waves & lateralized periodic discharges (LPDs) conferred an increased risk for focal onset seizures     Day 3: During the recording, the patient had 6 L hemispheric onset seizures. Electrographically, these seizures were similar to yesterday seizures. The interictal EEG was abnormal due to diffuse polymorphic theta slowing suggesting mild to moderate encephalopathy. Continuous L hemispheric slowing suggested underlying structural defect. Frequent L central parietal sharp waves & lateralized periodic discharges (LPDs) conferred an increased risk for focal onset seizures    Day 4: During this day of recording no events were recorded. Continuous left hemispheric slowing suggested underlying structural defect. Occasional left central parietal sharp waves and lateralized periodic discharges (LPDs) conferred an increased risk for focal onset seizures. Day 5:  During this day of recording no events were recorded. Continuous left hemispheric slowing suggested underlying structural defect. Occasional left central parietal sharp waves and lateralized periodic discharges (LPDs) conferred an increased risk for focal onset seizures. Day 6: During this day of recording no events were recorded. Continuous left hemispheric slowing suggested underlying structural defect. Previously seen left sided LPDs were not seen on today's recording.                  Impression:  -Acute metabolic encephalopathy in the setting of hypertensive urgency, febrile illness with positive SIRS criteria and subclinical status  -Subclinical seizures; last seizure 10/6/22    Plan:  -MRI brain with and without contrast with final review pending; no acute changes, evidence of PRES or enhancement on personal review  -Will discontinue LTME, no seizures since 10/6  -Continue Vimpat 100 mg twice daily, Keppra 1 g twice daily, Lamictal 125 mg twice daily  -Agree with increased Seroquel  -Patient continues on antibiotics  -Continue therapies  -We will follow    Please note that this note was generated using a voice recognition dictation software. Although every effort was made to ensure the accuracy of this automated transcription, some errors in transcription may have occurred.

## 2022-10-09 NOTE — PLAN OF CARE
Problem: Safety - Adult  Goal: Free from fall injury  10/9/2022 1452 by Marciana Dakin, RN  Outcome: Progressing  10/9/2022 0526 by Perla Perez RN  Outcome: Progressing     Problem: Safety - Adult  Goal: Free from fall injury  10/9/2022 1452 by Marciana Dakin, RN  Outcome: Progressing  10/9/2022 0526 by Perla Perez RN  Outcome: Progressing

## 2022-10-10 LAB
ABSOLUTE EOS #: 0.23 K/UL (ref 0–0.44)
ABSOLUTE IMMATURE GRANULOCYTE: 0.04 K/UL (ref 0–0.3)
ABSOLUTE LYMPH #: 0.9 K/UL (ref 1.1–3.7)
ABSOLUTE MONO #: 1.01 K/UL (ref 0.1–1.2)
ANION GAP SERPL CALCULATED.3IONS-SCNC: 11 MMOL/L (ref 9–17)
BASOPHILS # BLD: 0 % (ref 0–2)
BASOPHILS ABSOLUTE: <0.03 K/UL (ref 0–0.2)
BUN BLDV-MCNC: 10 MG/DL (ref 6–20)
CALCIUM SERPL-MCNC: 8.5 MG/DL (ref 8.6–10.4)
CHLORIDE BLD-SCNC: 104 MMOL/L (ref 98–107)
CO2: 24 MMOL/L (ref 20–31)
CREAT SERPL-MCNC: 1.1 MG/DL (ref 0.5–0.9)
EKG ATRIAL RATE: 102 BPM
EKG P AXIS: 53 DEGREES
EKG P-R INTERVAL: 160 MS
EKG Q-T INTERVAL: 340 MS
EKG QRS DURATION: 86 MS
EKG QTC CALCULATION (BAZETT): 443 MS
EKG R AXIS: -51 DEGREES
EKG T AXIS: 13 DEGREES
EKG VENTRICULAR RATE: 102 BPM
EOSINOPHILS RELATIVE PERCENT: 3 % (ref 1–4)
GFR SERPL CREATININE-BSD FRML MDRD: 58 ML/MIN/1.73M2
GLUCOSE BLD-MCNC: 101 MG/DL (ref 70–99)
HCT VFR BLD CALC: 36.1 % (ref 36.3–47.1)
HEMOGLOBIN: 11.7 G/DL (ref 11.9–15.1)
IMMATURE GRANULOCYTES: 1 %
LACTIC ACID, SEPSIS WHOLE BLOOD: 1.2 MMOL/L (ref 0.5–1.9)
LYMPHOCYTES # BLD: 11 % (ref 24–43)
MAGNESIUM: 1.7 MG/DL (ref 1.6–2.6)
MCH RBC QN AUTO: 30.1 PG (ref 25.2–33.5)
MCHC RBC AUTO-ENTMCNC: 32.4 G/DL (ref 28.4–34.8)
MCV RBC AUTO: 92.8 FL (ref 82.6–102.9)
MONOCYTES # BLD: 12 % (ref 3–12)
NRBC AUTOMATED: 0 PER 100 WBC
PDW BLD-RTO: 16.6 % (ref 11.8–14.4)
PLATELET # BLD: 192 K/UL (ref 138–453)
PMV BLD AUTO: 10.5 FL (ref 8.1–13.5)
POTASSIUM SERPL-SCNC: 2.9 MMOL/L (ref 3.7–5.3)
POTASSIUM SERPL-SCNC: 4.1 MMOL/L (ref 3.7–5.3)
RBC # BLD: 3.89 M/UL (ref 3.95–5.11)
RBC # BLD: ABNORMAL 10*6/UL
SEG NEUTROPHILS: 73 % (ref 36–65)
SEGMENTED NEUTROPHILS ABSOLUTE COUNT: 6.38 K/UL (ref 1.5–8.1)
SODIUM BLD-SCNC: 139 MMOL/L (ref 135–144)
WBC # BLD: 8.6 K/UL (ref 3.5–11.3)

## 2022-10-10 PROCEDURE — 6370000000 HC RX 637 (ALT 250 FOR IP)

## 2022-10-10 PROCEDURE — 6370000000 HC RX 637 (ALT 250 FOR IP): Performed by: STUDENT IN AN ORGANIZED HEALTH CARE EDUCATION/TRAINING PROGRAM

## 2022-10-10 PROCEDURE — 99232 SBSQ HOSP IP/OBS MODERATE 35: CPT | Performed by: INTERNAL MEDICINE

## 2022-10-10 PROCEDURE — 83605 ASSAY OF LACTIC ACID: CPT

## 2022-10-10 PROCEDURE — 6360000002 HC RX W HCPCS: Performed by: STUDENT IN AN ORGANIZED HEALTH CARE EDUCATION/TRAINING PROGRAM

## 2022-10-10 PROCEDURE — 93010 ELECTROCARDIOGRAM REPORT: CPT | Performed by: INTERNAL MEDICINE

## 2022-10-10 PROCEDURE — 36415 COLL VENOUS BLD VENIPUNCTURE: CPT

## 2022-10-10 PROCEDURE — 84132 ASSAY OF SERUM POTASSIUM: CPT

## 2022-10-10 PROCEDURE — 2060000000 HC ICU INTERMEDIATE R&B

## 2022-10-10 PROCEDURE — APPSS30 APP SPLIT SHARED TIME 16-30 MINUTES: Performed by: NURSE PRACTITIONER

## 2022-10-10 PROCEDURE — 6360000002 HC RX W HCPCS

## 2022-10-10 PROCEDURE — 2500000003 HC RX 250 WO HCPCS

## 2022-10-10 PROCEDURE — 99232 SBSQ HOSP IP/OBS MODERATE 35: CPT | Performed by: STUDENT IN AN ORGANIZED HEALTH CARE EDUCATION/TRAINING PROGRAM

## 2022-10-10 PROCEDURE — 93005 ELECTROCARDIOGRAM TRACING: CPT

## 2022-10-10 PROCEDURE — 83735 ASSAY OF MAGNESIUM: CPT

## 2022-10-10 PROCEDURE — 80048 BASIC METABOLIC PNL TOTAL CA: CPT

## 2022-10-10 PROCEDURE — 85025 COMPLETE CBC W/AUTO DIFF WBC: CPT

## 2022-10-10 PROCEDURE — 6370000000 HC RX 637 (ALT 250 FOR IP): Performed by: NURSE PRACTITIONER

## 2022-10-10 PROCEDURE — 2580000003 HC RX 258: Performed by: STUDENT IN AN ORGANIZED HEALTH CARE EDUCATION/TRAINING PROGRAM

## 2022-10-10 PROCEDURE — 99231 SBSQ HOSP IP/OBS SF/LOW 25: CPT | Performed by: PSYCHIATRY & NEUROLOGY

## 2022-10-10 RX ORDER — POTASSIUM CHLORIDE 750 MG/1
10 TABLET, EXTENDED RELEASE ORAL 2 TIMES DAILY WITH MEALS
Qty: 60 TABLET | Refills: 3 | Status: SHIPPED | OUTPATIENT
Start: 2022-10-10 | End: 2022-10-13 | Stop reason: HOSPADM

## 2022-10-10 RX ORDER — POTASSIUM CHLORIDE 20 MEQ/1
40 TABLET, EXTENDED RELEASE ORAL EVERY 6 HOURS PRN
Status: DISCONTINUED | OUTPATIENT
Start: 2022-10-10 | End: 2022-10-10

## 2022-10-10 RX ORDER — AMLODIPINE BESYLATE 10 MG/1
10 TABLET ORAL DAILY
Status: DISCONTINUED | OUTPATIENT
Start: 2022-10-10 | End: 2022-10-17 | Stop reason: HOSPADM

## 2022-10-10 RX ORDER — MICONAZOLE NITRATE 20.6 MG/G
POWDER TOPICAL
Qty: 45 G | Refills: 1 | Status: SHIPPED | OUTPATIENT
Start: 2022-10-10

## 2022-10-10 RX ORDER — POTASSIUM CHLORIDE 20 MEQ/1
40 TABLET, EXTENDED RELEASE ORAL PRN
Status: DISCONTINUED | OUTPATIENT
Start: 2022-10-10 | End: 2022-10-10

## 2022-10-10 RX ORDER — MAGNESIUM SULFATE IN WATER 40 MG/ML
2000 INJECTION, SOLUTION INTRAVENOUS ONCE
Status: COMPLETED | OUTPATIENT
Start: 2022-10-10 | End: 2022-10-10

## 2022-10-10 RX ORDER — QUETIAPINE FUMARATE 25 MG/1
25 TABLET, FILM COATED ORAL 2 TIMES DAILY
Qty: 60 TABLET | Refills: 3 | Status: SHIPPED | OUTPATIENT
Start: 2022-10-10 | End: 2022-10-13 | Stop reason: HOSPADM

## 2022-10-10 RX ORDER — POTASSIUM CHLORIDE 20 MEQ/1
40 TABLET, EXTENDED RELEASE ORAL EVERY 8 HOURS PRN
Status: DISCONTINUED | OUTPATIENT
Start: 2022-10-10 | End: 2022-10-10

## 2022-10-10 RX ORDER — LACOSAMIDE 100 MG/1
100 TABLET ORAL 2 TIMES DAILY
Qty: 60 TABLET | Refills: 2 | Status: SHIPPED | OUTPATIENT
Start: 2022-10-10 | End: 2022-10-14 | Stop reason: HOSPADM

## 2022-10-10 RX ORDER — POTASSIUM CHLORIDE 7.45 MG/ML
10 INJECTION INTRAVENOUS PRN
Status: DISCONTINUED | OUTPATIENT
Start: 2022-10-10 | End: 2022-10-10

## 2022-10-10 RX ORDER — AMLODIPINE BESYLATE 10 MG/1
10 TABLET ORAL DAILY
Qty: 30 TABLET | Refills: 3 | Status: SHIPPED | OUTPATIENT
Start: 2022-10-11

## 2022-10-10 RX ORDER — POTASSIUM CHLORIDE 20 MEQ/1
40 TABLET, EXTENDED RELEASE ORAL ONCE
Status: COMPLETED | OUTPATIENT
Start: 2022-10-10 | End: 2022-10-10

## 2022-10-10 RX ORDER — LOPERAMIDE HYDROCHLORIDE 2 MG/1
2 CAPSULE ORAL ONCE
Status: DISCONTINUED | OUTPATIENT
Start: 2022-10-10 | End: 2022-10-10

## 2022-10-10 RX ADMIN — METOPROLOL TARTRATE 25 MG: 25 TABLET ORAL at 09:22

## 2022-10-10 RX ADMIN — LORAZEPAM 1 MG: 1 TABLET ORAL at 01:27

## 2022-10-10 RX ADMIN — LEVOFLOXACIN 500 MG: 500 TABLET, FILM COATED ORAL at 09:36

## 2022-10-10 RX ADMIN — QUETIAPINE FUMARATE 25 MG: 25 TABLET ORAL at 09:21

## 2022-10-10 RX ADMIN — POTASSIUM CHLORIDE 40 MEQ: 1500 TABLET, EXTENDED RELEASE ORAL at 06:08

## 2022-10-10 RX ADMIN — LEVETIRACETAM 1000 MG: 500 TABLET, FILM COATED ORAL at 20:14

## 2022-10-10 RX ADMIN — SODIUM CHLORIDE, PRESERVATIVE FREE 10 ML: 5 INJECTION INTRAVENOUS at 20:15

## 2022-10-10 RX ADMIN — MAGNESIUM SULFATE HEPTAHYDRATE 2000 MG: 40 INJECTION, SOLUTION INTRAVENOUS at 09:38

## 2022-10-10 RX ADMIN — LAMOTRIGINE 125 MG: 100 TABLET ORAL at 09:21

## 2022-10-10 RX ADMIN — AMLODIPINE BESYLATE 10 MG: 10 TABLET ORAL at 09:21

## 2022-10-10 RX ADMIN — POTASSIUM CHLORIDE 10 MEQ: 1500 TABLET, EXTENDED RELEASE ORAL at 09:22

## 2022-10-10 RX ADMIN — POTASSIUM CHLORIDE 40 MEQ: 20 TABLET, EXTENDED RELEASE ORAL at 09:23

## 2022-10-10 RX ADMIN — LACOSAMIDE 100 MG: 100 TABLET, FILM COATED ORAL at 20:14

## 2022-10-10 RX ADMIN — Medication 5 MG: at 20:15

## 2022-10-10 RX ADMIN — LEVETIRACETAM 1000 MG: 500 TABLET, FILM COATED ORAL at 09:22

## 2022-10-10 RX ADMIN — APIXABAN 5 MG: 5 TABLET, FILM COATED ORAL at 09:22

## 2022-10-10 RX ADMIN — ANTI-FUNGAL POWDER MICONAZOLE NITRATE TALC FREE: 1.42 POWDER TOPICAL at 20:21

## 2022-10-10 RX ADMIN — LAMOTRIGINE 125 MG: 100 TABLET ORAL at 20:14

## 2022-10-10 RX ADMIN — SODIUM CHLORIDE, PRESERVATIVE FREE 10 ML: 5 INJECTION INTRAVENOUS at 09:24

## 2022-10-10 RX ADMIN — LACOSAMIDE 100 MG: 100 TABLET, FILM COATED ORAL at 09:21

## 2022-10-10 RX ADMIN — FUROSEMIDE 20 MG: 20 TABLET ORAL at 09:21

## 2022-10-10 RX ADMIN — FAMOTIDINE 20 MG: 20 TABLET, FILM COATED ORAL at 09:22

## 2022-10-10 RX ADMIN — FAMOTIDINE 20 MG: 20 TABLET, FILM COATED ORAL at 20:14

## 2022-10-10 RX ADMIN — SERTRALINE 100 MG: 50 TABLET, FILM COATED ORAL at 09:22

## 2022-10-10 RX ADMIN — QUETIAPINE FUMARATE 25 MG: 25 TABLET ORAL at 20:18

## 2022-10-10 RX ADMIN — OXYCODONE HYDROCHLORIDE AND ACETAMINOPHEN 1 TABLET: 5; 325 TABLET ORAL at 11:25

## 2022-10-10 RX ADMIN — SODIUM CHLORIDE 50 ML/HR: 9 INJECTION, SOLUTION INTRAVENOUS at 09:37

## 2022-10-10 RX ADMIN — APIXABAN 5 MG: 5 TABLET, FILM COATED ORAL at 20:14

## 2022-10-10 RX ADMIN — METOPROLOL TARTRATE 25 MG: 25 TABLET ORAL at 20:15

## 2022-10-10 RX ADMIN — OXYCODONE HYDROCHLORIDE AND ACETAMINOPHEN 1 TABLET: 5; 325 TABLET ORAL at 16:07

## 2022-10-10 RX ADMIN — POTASSIUM CHLORIDE 10 MEQ: 1500 TABLET, EXTENDED RELEASE ORAL at 17:33

## 2022-10-10 RX ADMIN — ONDANSETRON 4 MG: 2 INJECTION INTRAMUSCULAR; INTRAVENOUS at 17:33

## 2022-10-10 ASSESSMENT — PAIN SCALES - GENERAL
PAINLEVEL_OUTOF10: 0
PAINLEVEL_OUTOF10: 8
PAINLEVEL_OUTOF10: 7
PAINLEVEL_OUTOF10: 0

## 2022-10-10 ASSESSMENT — PAIN DESCRIPTION - LOCATION
LOCATION: BACK
LOCATION: ABDOMEN

## 2022-10-10 ASSESSMENT — PAIN DESCRIPTION - ORIENTATION
ORIENTATION: MID
ORIENTATION: MID

## 2022-10-10 ASSESSMENT — PAIN DESCRIPTION - DESCRIPTORS
DESCRIPTORS: ACHING
DESCRIPTORS: DULL

## 2022-10-10 NOTE — PROGRESS NOTES
Today's Date: 10/10/2022  Patient Name: Renato Alanis  Date of admission: 10/3/2022  1:07 PM  Patient's age: 59 y.o., 1965  Admission Dx: Septicemia ([facility] [address] [A41.9]  Altered mental status, unspecified altered mental status type [R41.82]  AMS (altered mental status) [R41.82]      Requesting Physician: No admitting provider for patient encounter. CHIEF COMPLAINT: Altered mental status. Sepsis. Recurrent metastatic ovarian cancer. History Obtained From:  patient, electronic medical record    Interval history:  Patient seen and examined bedside. Patient's blood pressure stable. Afebrile. Currently saturating well on room air. Oriented to person, place, and time. Patient states she feels better but she is still having auditory hallucinations. HISTORY OF PRESENT ILLNESS:      The patient is a 59 y.o.  female who is admitted to the hospital for further management of altered mental status. She had increasing shortness of breath. She was admitted for sepsis. She was intubated for airway protection. Patient was admitted on October 3. She was extubated yesterday. Were consulted today because of her history of recurrent ovarian cancer. Patient is mentally alert and oriented. No respiratory distress. No fever. She is not in pain. Brief oncologic history:  DIAGNOSIS:       Recurrent ovarian cancer. Original diagnosis 2005 with multiple recurrences. Diffuse metastasis. CURRENT THERAPY:         Doxil/ Avastin started 9/18/2020. Avastin was discontinued due to recent GI bleeding. Status post recent vascular embolization  S/p seizure disorder. Gemzar started 2/26/2021. Interrupted due to hospitalization and problem with compliance.    Evidence of disease progression on CT scan 2/22/22  Added Carboplatin to Gemzar March 2022        BRIEF CASE HISTORY:      Ms. Christina Rodriguez is a very pleasant 59 y.o. female with history of multiple co morbidities as listed. The patient seen in consultation for ovarian cancer with multiple recurrences. 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 She had surgical debulking and she had systemic treatment with Taxol and carboplatin for 6 cycles. Patient was in remission for about 2 years. She had a relapse in 2007 and treated with topotecan with good results. Patient relapsed again in 2008 and was treated with Taxol carboplatin. After 3 cycles of systemic chemotherapy she had problems with carboplatin so she finished 3 more cycles with Taxol. She did well again for 2 to 3 years until she had a relapse in 2012 and she had intraperitoneal chemotherapy treatment with Taxol. Patient was last seen by her oncologist in 2014 at which time she had relatively stable disease with normal tumor marker and no significant abnormal images. Patient moved to [place] after that and she was lost for oncology follow-ups. Patient was recently evaluated again here in [facility] because of abdominal discomfort. Re imaging confirmed metastatic relapse. Biopsy confirmed ovarian cancer recurrence. Scan showed extensive intraabdominal and splenic involvement and lung mets. She has no symptoms at the present time. Patient denies smoking or alcohol drinking.l       Past Medical History:   has a past medical history of Anemia, Bleeding, Cervical cancer ([address] Depression, Diabetes mellitus ([address] GERD (gastroesophageal reflux disease), Hx of blood clots, Hypertension, Metastatic cancer ([address] Ovarian cancer ([address] Post chemo evaluation, PRES (posterior reversible encephalopathy syndrome), and Splenic lesion. Past Surgical History:   has a past surgical history that includes Hysterectomy, total abdominal; Port Surgery; Tonsillectomy; IR PORT PLACEMENT > 5 YEARS (08/24/2020); Anus surgery; Abscess Drainage (2013); colectomy (03/2013);  IR EMBOLIZATION HEMORRHAGE (10/05/2020); and Cardiac catheterization. Family History: family history includes Alcohol Abuse in her mother; Cirrhosis in her mother. Social History:   reports that she has been smoking cigarettes. She has a 20.00 pack-year smoking history. She has never used smokeless tobacco. She reports that she does not currently use alcohol. She reports that she does not use drugs. Medications:    Prior to Admission medications    Medication Sig Start Date End Date Taking? Authorizing Provider   amLODIPine (NORVASC) 10 MG tablet Take 1 tablet by mouth daily 10/11/22  Yes Jossie Boyd MD   metoprolol tartrate (LOPRESSOR) 25 MG tablet Take 1 tablet by mouth 2 times daily 10/10/22  Yes Jossie Boyd MD   QUEtiapine (SEROQUEL) 25 MG tablet Take 1 tablet by mouth 2 times daily 10/10/22  Yes Jossie Boyd MD   lacosamide (VIMPAT) 100 MG TABS tablet Take 1 tablet by mouth 2 times daily for 90 days. 10/10/22 1/8/23 Yes Jossie Boyd MD   STIMULANT LAXATIVE 8.6-50 MG per tablet TAKE 2 TABLETS BY MOUTH NIGHTLY. 10/6/22   Shanda Medhkour, DO   morphine (MS CONTIN) 15 MG extended release tablet TAKE 1 TABLET BY MOUTH 2 TIMES DAILY FOR 30 DAYS. 9/28/22 10/28/22  Annette Goodman MD   lamoTRIgine (LAMICTAL) 25 MG tablet Take 5 tablets by mouth 2 times daily 9/22/22   Shanda Medhkour, DO   LORazepam (ATIVAN) 1 MG tablet TAKE 1 TABLET BY MOUTH EVERY 8 HOURS AS NEEDED FOR ANXIETY FOR UP TO 30 DAYS. 9/21/22 10/21/22  Annette Goodman MD   morphine (MS CONTIN) 30 MG extended release tablet TAKE 1 TABLET BY MOUTH 2 TIMES DAILY FOR 30 DAYS. 9/21/22 10/21/22  Annette Goodman MD   sertraline (ZOLOFT) 100 MG tablet TAKE 1 TABLET BY MOUTH DAILY 9/21/22 10/21/22  Annette Goodman MD   oxyCODONE-acetaminophen (PERCOCET) 5-325 MG per tablet Take 1 tablet by mouth every 4 hours as needed for Pain for up to 30 days.  9/13/22 10/13/22  Azalea Bhatia MD   FEROSUL 325 (65 Fe) MG tablet TAKE 1 TABLET BY MOUTH ONCE DAILY WITH BREAKFAST 9/9/22   Amparo Castellano MD   levETIRAcetam (KEPPRA) 1000 MG tablet TAKE 1 TABLET (1,000 MG TOTAL) BY MOUTH IN THE MORNING AND 1 TABLET (1,000 MG TOTAL) BEFORE BEDTIME. 9/9/22   Amparo Castellano MD   pantoprazole (PROTONIX) 40 MG tablet Take 1 tablet by mouth daily 9/1/22   Arnulfo Bhatia MD   Lactobacillus (ACIDOPHILUS) CAPS capsule TAKE 1 TABLET BY MOUTH ONCE DAILY IN THE MORNING AND 1 TABLET BEFORE BEDTIME 8/31/22   Shanda Hinds, DO   furosemide (LASIX) 20 MG tablet TAKE 1 TABLET (20 MG TOTAL) BY MOUTH DAILY.  8/29/22   Shanda Medthad, DO   dicyclomine (BENTYL) 20 MG tablet TAKE 1 TABLET (20 MG TOTAL) BY MOUTH IN THE MORNING AND 1 TABLET (20 MG TOTAL) BEFORE BEDTIME. 8/29/22   Shanda Guzman, DO   potassium chloride (MICRO-K) 10 MEQ extended release capsule TAKE 1 CAPSULE (10 MEQ TOTAL) BY MOUTH IN THE MORNING. 8/29/22   Shanda Hinds DO   Handicap Placard MISC 5 years 7/19/22   Moreno Hinds DO   loperamide (IMODIUM) 2 MG capsule Take 2 mg by mouth 4 times daily as needed for Diarrhea    Magalis Moran MD   ondansetron (ZOFRAN-ODT) 4 MG disintegrating tablet Take 1 tablet by mouth every 8 hours as needed for Nausea or Vomiting 5/13/22   Amparo Castellano MD   senna (SENOKOT) 8.6 MG tablet Take 1 tablet by mouth 2 times daily 5/2/22 5/2/23  Junior Bains MD   melatonin 5 MG TABS tablet Take 1 tablet by mouth daily 4/13/22   Shanda Hinds DO   benzonatate (TESSALON PERLES) 100 MG capsule Take 1 capsule by mouth 3 times daily as needed for Cough 4/13/22   Shanda Hinds DO   apixaban (ELIQUIS) 5 MG TABS tablet Take 1 tablet by mouth 2 times daily 4/8/22   Amparo Castellano MD   hydrocortisone (ANUSOL-HC) 2.5 % CREA rectal cream Apply on affected area twice daily 2/28/22   Sully Webster MD     Current Facility-Administered Medications   Medication Dose Route Frequency Provider Last Rate Last Admin    amLODIPine (NORVASC) tablet 10 mg  10 mg Oral Daily Ernie Betts MD 10 mg at 10/10/22 0921    metoprolol tartrate (LOPRESSOR) tablet 25 mg  25 mg Oral BID Cherylene Rubinstein, MD   25 mg at 10/10/22 7514    magnesium sulfate 2000 mg in 50 mL IVPB premix  2,000 mg IntraVENous Once Cherylene Rubinstein, MD 25 mL/hr at 10/10/22 0938 2,000 mg at 10/10/22 0938    potassium chloride (KLOR-CON M) extended release tablet 10 mEq  10 mEq Oral BID WC Cherylene Rubinstein, MD   10 mEq at 10/10/22 5683    sodium chloride flush 0.9 % injection 10 mL  10 mL IntraVENous PRN Everton Brien, DO        QUEtiapine (SEROQUEL) tablet 25 mg  25 mg Oral BID Cherylene Rubinstein, MD   25 mg at 10/10/22 0921    LORazepam (ATIVAN) tablet 1 mg  1 mg Oral Q4H PRN Cherylene Rubinstein, MD   1 mg at 10/10/22 0127    lacosamide (VIMPAT) tablet 100 mg  100 mg Oral BID Tha Magsi, APRN - CNP   100 mg at 10/10/22 0921    levETIRAcetam (KEPPRA) tablet 1,000 mg  1,000 mg Oral BID Tanisha Daigle, DO   1,000 mg at 10/10/22 5113    famotidine (PEPCID) tablet 20 mg  20 mg Oral BID Yane Sports, DO   20 mg at 10/10/22 8112    docusate (COLACE) 50 MG/5ML liquid 100 mg  100 mg Oral Daily Howie Jacobs MD   100 mg at 10/08/22 0837    apixaban (ELIQUIS) tablet 5 mg  5 mg Oral BID YanenothingGrinder, DO   5 mg at 10/10/22 8549    furosemide (LASIX) tablet 20 mg  20 mg Oral Daily Yane Sports, DO   20 mg at 10/10/22 1512    lamoTRIgine (LAMICTAL) tablet 125 mg  125 mg Oral BID Yane Sports, DO   125 mg at 10/10/22 0921    oxyCODONE-acetaminophen (PERCOCET) 5-325 MG per tablet 1 tablet  1 tablet Oral Q4H PRN YaneJammin Java, DO   1 tablet at 10/09/22 2052    sertraline (ZOLOFT) tablet 100 mg  100 mg Oral Daily Yane Sports, DO   100 mg at 10/10/22 0262    melatonin tablet 5 mg  5 mg Oral Nightly Yane Sports, DO   5 mg at 10/09/22 2045    morphine (PF) injection 2 mg  2 mg IntraVENous Q4H PRN Yane Sports, DO   2 mg at 10/05/22 0228    albuterol (PROVENTIL) nebulizer solution 2.5 mg  2.5 mg Nebulization Q6H PRN Corrinne Hipps, MD   2.5 mg at 10/08/22 0941    racepinephrine HCl (VAPONEFPRIN) 2.25 % nebulizer solution NEBU 11.25 mg  11.25 mg Nebulization Q3H PRN Napoleon Closs Sureddi, MD        sodium chloride nebulizer 0.9 % solution 3 mL  3 mL Nebulization Q4H PRN Soheila Patterson MD        sodium chloride flush 0.9 % injection 5-40 mL  5-40 mL IntraVENous 2 times per day Ram Hilt, DO   10 mL at 10/10/22 0924    sodium chloride flush 0.9 % injection 5-40 mL  5-40 mL IntraVENous PRN Ram Hilt, DO        0.9 % sodium chloride infusion   IntraVENous PRN Ram Hilt, DO 50 mL/hr at 10/10/22 0937 50 mL/hr at 10/10/22 0937    ondansetron (ZOFRAN-ODT) disintegrating tablet 4 mg  4 mg Oral Q8H PRN Ram Hilt, DO        Or    ondansetron [facility]) injection 4 mg  4 mg IntraVENous Q6H PRN Ram Hilt, DO   4 mg at 10/08/22 1759    polyethylene glycol (GLYCOLAX) packet 17 g  17 g Oral Daily PRN Ram Hilt, DO        acetaminophen (TYLENOL) tablet 650 mg  650 mg Oral Q6H PRN Ram Hilt, DO   325 mg at 10/08/22 1800    Or    acetaminophen (TYLENOL) suppository 650 mg  650 mg Rectal Q6H PRN Ram Hilt, DO           Allergies:  Carboplatin and Ceftriaxone    REVIEW OF SYSTEMS:      General: Positive for weakness and fatigue. Positive for weight loss and decreased appetite. No fever or chills. Eyes: No blurred vision, eye pain or double vision. Ears: No hearing problems or drainage. No tinnitus. Throat: No sore throat, problems with swallowing or dysphagia. Respiratory: No cough, sputum or hemoptysis. No shortness of breath. No pleuritic chest pain. Cardiovascular: No chest pain, orthopnea or PND. No lower extremity edema. No palpitation. Gastrointestinal: No problems with swallowing. No abdominal pain or bloating. No nausea or vomiting. No diarrhea or constipation. No GI bleeding. Genitourinary: No dysuria, hematuria, frequency or urgency. Musculoskeletal: No muscle aches or pains. No limitation of movement. No back pain.  No gait disturbance, No joint complaints. Dermatologic: No skin rashes or pruritus. No skin lesions or discolorations. Psychiatric: As above. Hematologic: No history of bleeding tendency. No bruises or ecchymosis. No history of clotting problems. Infectious disease: No fever, chills or frequent infections. Endocrine: No polydipsia or polyuria. No temperature intolerance. Neurologic: No headaches or dizziness. No weakness or numbness of the extremities. No changes in balance, coordination,  memory, mentation, behavior. Allergic/Immunologic: No nasal congestion or hives. No repeated infections.        PHYSICAL EXAM:      BP (!) 156/85   Pulse 100   Temp 98 °F (36.7 °C)   Resp 30   Ht 5' 5\" (1.651 m)   Wt 151 lb 3.8 oz (68.6 kg)   SpO2 94%   BMI 25.17 kg/m²    Temp (24hrs), Av.9 °F (36.6 °C), Min:97 °F (36.1 °C), Max:99.2 °F (37.3 °C)      General appearance - not in pain or distress  Mental status - alert and oriented, anxious about auditory hallucinations   Eyes - pupils equal and reactive, extraocular eye movements intact  Ears - bilateral TM's and external ear canals normal  Nose - normal and patent, no erythema, discharge or polyps  Mouth - mucous membranes moist, pharynx normal without lesions  Neck - supple, no significant adenopathy  Lymphatics - no palpable lymphadenopathy, no hepatosplenomegaly  Chest - clear to auscultation, no wheezes, rales or rhonchi, symmetric air entry  Heart - normal rate, regular rhythm, normal S1, S2, no murmurs, rubs, clicks or gallops  Abdomen - soft, nontender, nondistended, no masses or organomegaly  Neurological - alert, oriented, normal speech, no focal findings or movement disorder noted  Musculoskeletal - no joint tenderness, deformity or swelling  Extremities - peripheral pulses normal, no pedal edema, no clubbing or cyanosis  Skin - normal coloration and turgor, no rashes, no suspicious skin lesions noted           DATA:      Labs:       CBC:   Recent Labs 10/09/22  0422 10/10/22  0351   WBC 8.3 8.6   HGB 12.4 11.7*   HCT 37.9 36.1*    192       BMP:   Recent Labs     10/09/22  0422 10/10/22  0351    139   K 3.5* 2.9*   CO2 21 24   BUN 11 10   CREATININE 1.05* 1.10*   LABGLOM >60 58*   GLUCOSE 116* 101*       PT/INR: No results for input(s): PROTIME, INR in the last 72 hours. APTT:No results for input(s): APTT in the last 72 hours. LIVER PROFILE:No results for input(s): AST, ALT, LABALBU in the last 72 hours. MRI BRAIN W WO CONTRAST  Narrative: EXAMINATION:  MRI OF THE BRAIN WITHOUT AND WITH CONTRAST  10/9/2022 9:35 am    TECHNIQUE:  Multiplanar multisequence MRI of the head/brain was performed without and  with the administration of intravenous contrast.    COMPARISON:  Brain MRI done 10/04/2022 and 02/24/2022. HISTORY:  ORDERING SYSTEM PROVIDED HISTORY: revaluation of parenchyma eveluate for PRES  or other lesions  TECHNOLOGIST PROVIDED HISTORY:  revaluation of parenchyma eveluate for PRES or other lesions  Reason for Exam: revaluation of parenchyma, PRES or other lesions    FINDINGS:  INTRACRANIAL STRUCTURES/VENTRICLES: Patchy cortical/subcortical FLAIR  hyperintense signal in the right greater than left occipital lobes is similar  to the brain MRI done October 4, 2022 and 02/24/2022. Small focal area of  cortical/subcortical FLAIR hyperintense signal in the left posterior frontal  lobe is similar compared to brain MRI done October 4, 2022 but new compared  to brain MRI done 02/24/2022. These areas of FLAIR hyperintense signal  demonstrate no correlating postcontrast enhancement, diffusion restriction or  gradient blooming. There is no acute infarct. No mass effect or midline shift. No evidence of an  acute intracranial hemorrhage. Generalized cerebral and cerebellar volume  loss. No deonte hydrocephalus. The sellar/suprasellar regions appear  unremarkable. The normal signal voids within the major intracranial vessels  appear maintained. No abnormal focus of enhancement is seen within the  brain. The axial FLAIR images demonstrate mild bilateral periventricular and  deep white matter signal hyperintensities which are nonspecific but commonly  seen in the setting of chronic small vessel ischemic disease. ORBITS: The visualized portion of the orbits demonstrate no acute abnormality. SINUSES: The visualized paranasal sinuses and mastoid air cells demonstrate  no acute abnormality. BONES/SOFT TISSUES: The bone marrow signal intensity appears normal. The soft  tissues demonstrate no acute abnormality. Impression: 1. Overall, findings are similar compared to brain MRI done October 4, 2022  with redemonstration of patchy areas of FLAIR hyperintense signal in the left  posterior frontal lobe and right greater than left occipital lobes which may  represent small old infarcts. 2.  No acute stroke, intracranial hemorrhage or abnormal enhancement. 3.  Mild chronic small vessel ischemic disease. IMPRESSION:    Primary Problem  Septicemia [facility])    Active Hospital Problems    Diagnosis Date Noted    Septicemia ([address] [A41.9] 10/03/2022     Priority: High    Metabolic encephalopathy [P60.14]     Breakthrough seizure ([address] Rei Deluna     AMS (altered mental status) [R41.82] 02/20/2022    Gastroesophageal reflux disease [K21.9] 01/08/2022    Iron deficiency anemia secondary to inadequate dietary iron intake [D50.8] 05/28/2021    Chronic deep vein thrombosis (DVT) of femoral vein of left lower extremity ([address] [I82.512] 03/11/2021    Cancer, metastatic to bone ([address] [C79.51] 01/28/2021    Seizure disorder, nonconvulsive, with status epilepticus ([address] [G40.901] 01/04/2021    Acute encephalopathy [G93.40]     Seizure ([address] [R56.9] 10/21/2020    Malignant neoplasm of ovary ([address] [C56.9] 08/17/2020   Altered mental status. Sepsis. Recurrent metastatic ovarian cancer on chemotherapy treatment.     RECOMMENDATIONS:  Records and labs and images were reviewed and discussed with the patient. Patient was treated for sepsis and she was extubated successfully. She had metabolic encephalopathy which has resolved. MRI findings similar to previous MRI. Patient is currently on active chemotherapy treatment for ovarian cancer. Last treatment was September 29, 2022. We will monitor labs closely. Further management for ovarian cancer will be as outpatient. Patient's questions were answered to the best of her satisfaction and she verbalized full understanding and agreement. Thank you for allowing us to participate in the care of this pleasant patient. Discussed with patient and Nurse. Shi Salcedo, S-III  Hematology-Oncology      Attending Physician Statement   I have discussed the care of this patient, including pertinent history and exam findings, with the resident. I have seen and examined the patient and the key elements of all parts of the encounter have been performed by me. I agree with the assessment, plan and orders as documented by the resident and with changes made to the note.     Manuel Arreola MD  Hematology/Oncology    Cell: [phone]

## 2022-10-10 NOTE — CARE COORDINATION
[facility] Quality Flow/Interdisciplinary Rounds Progress Note    Quality Flow Rounds held on October 10, 2022 at [address] Attending:  Bedside Nurse, , , and Nursing Unit Leadership    Barriers to Discharge: Placement    Anticipated Discharge Date:   10/10/2022    Anticipated Discharge Disposition: Home vs SNF    Readmission Risk              Risk of Unplanned Readmission:  49           Discussed patient goal for the day, patient clinical progression, and barriers to discharge. The following Goal(s) of the Day/Commitment(s) have been identified:  Activity Progression  Transitional Care Plan and Choice - Obtain Post-Acute Preference(s)    Patient goal is home, however Therapy is recommending home with 24 hour support vs SNF. Met with patient and her daughter, daughter informs CM that patient will not have 24/7 support and they are concerned for her safety at discharge. Discussed SNF placement with patient, she agrees to review SNF list and provide choices after she discusses them with her daughter. Advised to provide three choices.       Javier Grady RN  October 10, 2022

## 2022-10-10 NOTE — PROGRESS NOTES
NEUROLOGY INPATIENT PROGRESS NOTE    10/10/2022         Current Exam:     Chart reviewed. Discussed with RN. No seizure activity reported. She states she continues to have auditory hallucinations. Would like to discharge home. No SI/HI. Brief History:    Mariano Salinas is a  59 y.o. female with H/O prior PRES with new onset seizure disorder in October 2020, recurrent ovarian cancer with peritoneal and lung mets, intra-abdominal bleeding due to splenic mass with GI infiltration status post embolization in 2020, HTN, DM, and chronic DVT, who was admitted on 10/3/2022 with acute onset altered mentation. Patient's daughter reported when they went to wake her she was difficult to arouse and another daughter saw her on the bedroom camera vomiting, having incontinence and behaving in an erratic way. Reportedly seizure activity was witnessed. Patient has had multiple hospitalizations for breakthrough seizures with negative work-ups and AED adjustments. She had been on Keppra 1 g twice daily and Lamictal 125 mg twice daily prior to arrival.  Neurology was consulted on 10/4 for ongoing disorientation and agitation following extubation on 10/4. She was started on LTME showing evidence of multiple seizures. Vimpat was added to her regimen. No current facility-administered medications on file prior to encounter. Current Outpatient Medications on File Prior to Encounter   Medication Sig Dispense Refill    morphine (MS CONTIN) 15 MG extended release tablet TAKE 1 TABLET BY MOUTH 2 TIMES DAILY FOR 30 DAYS. 60 tablet 0    lamoTRIgine (LAMICTAL) 25 MG tablet Take 5 tablets by mouth 2 times daily 300 tablet 2    LORazepam (ATIVAN) 1 MG tablet TAKE 1 TABLET BY MOUTH EVERY 8 HOURS AS NEEDED FOR ANXIETY FOR UP TO 30 DAYS. 90 tablet 0    morphine (MS CONTIN) 30 MG extended release tablet TAKE 1 TABLET BY MOUTH 2 TIMES DAILY FOR 30 DAYS.  60 tablet 0    sertraline (ZOLOFT) 100 MG tablet TAKE 1 TABLET BY MOUTH DAILY 30 tablet 4    oxyCODONE-acetaminophen (PERCOCET) 5-325 MG per tablet Take 1 tablet by mouth every 4 hours as needed for Pain for up to 30 days. 180 tablet 0    FEROSUL 325 (65 Fe) MG tablet TAKE 1 TABLET BY MOUTH ONCE DAILY WITH BREAKFAST 30 tablet 2    levETIRAcetam (KEPPRA) 1000 MG tablet TAKE 1 TABLET (1,000 MG TOTAL) BY MOUTH IN THE MORNING AND 1 TABLET (1,000 MG TOTAL) BEFORE BEDTIME. 60 tablet 3    FEROSUL 325 (65 Fe) MG tablet TAKE 1 TABLET (325 MG TOTAL) BY MOUTH DAILY WITH BREAKFAST. 30 tablet 2    pantoprazole (PROTONIX) 40 MG tablet Take 1 tablet by mouth daily 30 tablet 3    Lactobacillus (ACIDOPHILUS) CAPS capsule TAKE 1 TABLET BY MOUTH ONCE DAILY IN THE MORNING AND 1 TABLET BEFORE BEDTIME 60 capsule 5    furosemide (LASIX) 20 MG tablet TAKE 1 TABLET (20 MG TOTAL) BY MOUTH DAILY. 30 tablet 3    dicyclomine (BENTYL) 20 MG tablet TAKE 1 TABLET (20 MG TOTAL) BY MOUTH IN THE MORNING AND 1 TABLET (20 MG TOTAL) BEFORE BEDTIME. 60 tablet 3    potassium chloride (MICRO-K) 10 MEQ extended release capsule TAKE 1 CAPSULE (10 MEQ TOTAL) BY MOUTH IN THE MORNING. 30 capsule 3    Handicap Placard MISC 5 years 1 each 0    loperamide (IMODIUM) 2 MG capsule Take 2 mg by mouth 4 times daily as needed for Diarrhea      ondansetron (ZOFRAN-ODT) 4 MG disintegrating tablet Take 1 tablet by mouth every 8 hours as needed for Nausea or Vomiting 60 tablet 2    senna (SENOKOT) 8.6 MG tablet Take 1 tablet by mouth 2 times daily 60 tablet 11    melatonin 5 MG TABS tablet Take 1 tablet by mouth daily 30 tablet 3    benzonatate (TESSALON PERLES) 100 MG capsule Take 1 capsule by mouth 3 times daily as needed for Cough 30 capsule 1    apixaban (ELIQUIS) 5 MG TABS tablet Take 1 tablet by mouth 2 times daily 60 tablet 5    hydrocortisone (ANUSOL-HC) 2.5 % CREA rectal cream Apply on affected area twice daily 1 each 1       Allergies: Alee Eric is allergic to carboplatin and ceftriaxone.     Past Medical History:   Diagnosis Date    Anemia     Bleeding 10/2020    intra-abdominal bleeding -due to splenic mass with GI infiltration. Status post embolization    Cervical cancer (HCC)     Depression     Diabetes mellitus ([address]     GERD (gastroesophageal reflux disease)     Hx of blood clots     Hypertension     Metastatic cancer ([facility] [address] 10/2020    extensive intraabdominal and splenic involvement and lung mets. Ovarian cancer ([facility] [address]     low grade serous ovarian carcinoma    Post chemo evaluation     2007: Chemo via med onc (Dr. Aries Beverly), 2008: Trevino [place] due to rising CA-125, 2013: intraperitoneal chemo,12/2015: Ca125 - 25     PRES (posterior reversible encephalopathy syndrome)     Splenic lesion        Past Surgical History:   Procedure Laterality Date    ABSCESS DRAINAGE  2013    Franca rectal    ANUS SURGERY      ANAL FISSURECTOMY    CARDIAC CATHETERIZATION      COLECTOMY  03/2013    ex lap, tumor debulking, transverse colectomy w reanastamosis, subgastric omentectomy, intraperitoneal port placement    HYSTERECTOMY, TOTAL ABDOMINAL (CERVIX REMOVED)      IR EMBOLIZATION HEMORRHAGE  10/05/2020    intra-abdominal bleeding -due to splenic mass with GI infiltration. Status post embolization [employer] interlock coils x7. mri condtional 3t ok, safe immediately post implant. IR PORT PLACEMENT EQUAL OR GREATER THAN 5 YEARS  08/24/2020    IR PORT PLACEMENT EQUAL OR GREATER THAN 5 YEARS 8/24/2020 Arias Akhtar MD [facility]    PORT SURGERY      IP Port    TONSILLECTOMY         Social History: Darrel Sexton  reports that she has been smoking cigarettes. She has a 20.00 pack-year smoking history. She has never used smokeless tobacco. She reports that she does not currently use alcohol. She reports that she does not use drugs.     Family History   Problem Relation Age of Onset    Alcohol Abuse Mother     Cirrhosis Mother        Objective:   BP (!) 156/85   Pulse 97   Temp 98 °F (36.7 °C)   Resp 30   Ht 5' 5\" (1.651 m)   Wt 151 lb 3.8 oz (68.6 kg)   SpO2 94%   BMI 25.17 kg/m²     Blood pressure range: Systolic (19JNE), KQT:686 , Min:132 , KJP:137   ; Diastolic (83JVI), JAU:47, Min:81, Max:105      Review of Systems:  Constitutional  Negative for fever and chills    HEENT  Negative for ear discharge, ear pain, nosebleed    Eyes  Negative for photophobia, pain and discharge    Respiratory  Negative for hemoptysis and sputum    Cardiovascular  Negative for orthopnea, claudication and PND    Gastrointestinal  Negative for abdominal pain, diarrhea, blood in stool    Musculoskeletal  Negative for joint pain, negative for myalgia    Skin  Negative for rash or itching    Endo/heme/allergies  Negative for polydipsia, environmental allergy    Psychiatric/behavioral  Negative for suicidal ideation. Patient is not anxious        NEUROLOGIC EXAMINATION  GENERAL  Appears comfortable and in no distress   HEENT  NC/ AT   NECK  Supple   MENTAL STATUS:  Alert, oriented to person, place, states year is 2023, normal speech, normal language, + hallucination. Intact naming. Follows simple commands.    CRANIAL NERVES: II     -      Visual fields intact to confrontation  III,IV,VI -  EOMs full, no afferent defect, no JOSE A, no ptosis  V     -     Normal facial sensation  VII    -     Normal facial symmetry  VIII   -     Intact hearing  IX,X -     Symmetrical palate  XI    -     Symmetrical shoulder shrug  XII   -     Midline tongue, no atrophy    MOTOR FUNCTION:  Lifts all limbs antigravity with normal bulk, normal tone and no involuntary movements, no tremor   SENSORY FUNCTION:  Normal touch, normal pin   CEREBELLAR FUNCTION:  Intact fine motor control over upper limbs   REFLEX FUNCTION:  Symmetric, no perverted reflex, no Babinski sign   STATION and GAIT  Not tested       Data:    Lab Results:   CBC:   Recent Labs     10/08/22  0412 10/09/22  0422 10/10/22  0351   WBC 7.7 8.3 8.6   HGB 12.6 12.4 11.7*   PLT See Reflexed IPF Result 146 192     BMP:    Recent Labs 10/08/22  0412 10/09/22  0422 10/10/22  0351    141 139   K 3.5* 3.5* 2.9*    106 104   CO2 21 21 24   BUN 6 11 10   CREATININE 1.03* 1.05* 1.10*   GLUCOSE 107* 116* 101*         Lab Results   Component Value Date    CHOL 131 03/10/2021    LDLCHOLESTEROL 69 03/10/2021    HDL 33 (L) 03/10/2021    TRIG 147 03/10/2021    ALT 9 10/03/2022    AST 18 10/03/2022    TSH 2.93 03/09/2021    INR 1.2 05/03/2022    LABA1C 5.2 04/13/2022    [id number] 06/04/2022           Diagnostic data reviewed:  CT HEAD (10/3/2022): No acute intracranial abnormality        MRI BRAIN (10/4/2022):   1. New subtle focus of T2/FLAIR hyperintensity in lateral L frontal cortex & subcortical white matter    is nonspecific and may reflect sequela of prior infarct or mild PRES. 2. Stable small foci of R occipital encephalomalacia; sequela of prior insult. MRI BRAIN W/WO: (10/9/22) -  1. Overall, findings are similar compared to brain MRI done October 4, 2022   with redemonstration of patchy areas of FLAIR hyperintense signal in the left   posterior frontal lobe and right greater than left occipital lobes which may   represent small old infarcts. 2.  No acute stroke, intracranial hemorrhage or abnormal enhancement. 3.  Mild chronic small vessel ischemic disease. CTA HEAD & NECK (10/3/2022): No LVO        ECHO (2/22/2022): EF 55%       LTME (10/4 -   Day 1: During the recording, the patient had 1 subclinical seizure lasting about 40 seconds originating from L hemisphere. The interictal EEG was abnormal due to diffuse polymorphic theta slowing suggesting mild to moderate encephalopathy. Continuous L hemispheric slowing suggested underlying structural defect. frequent L central parietal sharp waves and lateralized periodic discharges conferred an increased risk for focal onset seizures     Day 2: During the recording, the patient had 11 electroclinical seizures with L hand flapping & L head deviation. Electrographically, these seizures were similar to yesterday seizures. The interictal EEG was abnormal due to diffuse polymorphic theta slowing suggesting mild to moderate encephalopathy. Continuous L hemispheric slowing suggested underlying structural defect. Frequent L central parietal sharp waves & lateralized periodic discharges (LPDs) conferred an increased risk for focal onset seizures     Day 3: During the recording, the patient had 6 L hemispheric onset seizures. Electrographically, these seizures were similar to yesterday seizures. The interictal EEG was abnormal due to diffuse polymorphic theta slowing suggesting mild to moderate encephalopathy. Continuous L hemispheric slowing suggested underlying structural defect. Frequent L central parietal sharp waves & lateralized periodic discharges (LPDs) conferred an increased risk for focal onset seizures    Day 4: During this day of recording no events were recorded. Continuous left hemispheric slowing suggested underlying structural defect. Occasional left central parietal sharp waves and lateralized periodic discharges (LPDs) conferred an increased risk for focal onset seizures. Day 5:  During this day of recording no events were recorded. Continuous left hemispheric slowing suggested underlying structural defect. Occasional left central parietal sharp waves and lateralized periodic discharges (LPDs) conferred an increased risk for focal onset seizures. Day 6: During this day of recording no events were recorded. Continuous left hemispheric slowing suggested underlying structural defect. Previously seen left sided LPDs were not seen on today's recording.                  Impression:  -Acute metabolic encephalopathy in the setting of hypertensive urgency, febrile illness with positive SIRS criteria and subclinical status; improving  -Subclinical seizures; last seizure 10/6/22    Plan:  -MRI brain with no clear evidence of PRES, no evidence of mets to the brain  -Continue Vimpat 100 mg twice daily, Keppra 1 g twice daily, Lamictal 125 mg twice daily  -She continues on Seroquel  -Continue therapies  -No driving or operating heavy machinery until cleared by the outpatient neurology office. Avoid open water, fire, and climbing to high heights.  -We will follow while she is here. Will need outpatient follow up in 4-6 weeks. Please note that this note was generated using a voice recognition dictation software. Although every effort was made to ensure the accuracy of this automated transcription, some errors in transcription may have occurred.

## 2022-10-10 NOTE — DISCHARGE INSTRUCTIONS
Please start taking norvasc 10 mg qd and lopressor 25 mg bid for blood pressure  Please add vimpat 100 mg bid for AED  Please take seraquel 25 mg bid due to hallucinations.   Please f/u with your oncologist out patient and PCP  Please get BMP done in one week to f/u on potassium levels   Please use miconazole at area of rash (antifungal)

## 2022-10-10 NOTE — ADT AUTH CERT
Subscriber Details  Hospital Account [de-identified]  CV[facility] Subscriber Name/Sex/Relation Subscriber  Subscriber Address/Phone Subscriber Emp/Emp Phone   1.  [facility]   [id number] [facility] Female   (Self) 1965 [address] [place], [place]  [address]   [phone](I)   [phone](M) NONE      Utilization Reviews       Sepsis and Other Febrile Illness, without Focal Infection - Care Day 5 (10/7/2022) by Raf Dozier RN       Review Status Review Entered   Completed 10/10/2022 1321       Created By   Raf Dozier RN      Criteria Review      Care Day: 5 Care Date: 10/7/2022 Level of Care: ICU    Guideline Day 2    Level Of Care    (X) ICU or floor    10/10/2022 1:21 PM EDT by Dolores Noe      MICU TO SD    Clinical Status    (X) * Hemodynamic stability    10/10/2022 1:21 PM EDT by Dolores Noe      VSS    ( ) * Hypoxemia absent    ( ) * Tachypnea absent    Activity    (X) Activity as tolerated    10/10/2022 1:21 PM EDT by Dolores Noe      AS JEY    Routes    (X) Parenteral or oral medications    10/10/2022 1:21 PM EDT by Mario Alberto Mcguire    (X) Diet as tolerated    10/10/2022 1:21 PM EDT by Dolores Noe      ASA JEY    Interventions    (X) WBC    10/10/2022 1:21 PM EDT by Dolores Noe      8.8    Medications    (X) Possible antimicrobial treatment    10/10/2022 1:21 PM EDT by Allyson Foster    (X) Possible DVT prophylaxis    10/10/2022 1:21 PM EDT by Dolores Noe      EPC       Definitions for Care Day 5    Hemodynamic stability    (X) Hemodynamic stability, as indicated by  1 or more  of the following :       (X) Patient hemodynamically stable, as indicated by  ALL  of the following  (1) (2) (3) (4) (5):          (X) Tachycardia absent          (X) Hypotension absent          (X) No evidence of inadequate perfusion (eg, no myocardial ischemia)          (X) No other hemodynamic abnormalities (eg, no Orthostatic hypotension)       * Milestone   Additional Notes   DATE: 10/7/22         RELEVANT BASELINES: (lab values, vitals, o2 amount/delivery, etc.)   10/6/2022: Neurology to be started Keppra 1 g home dose and increased it to 1 g twice daily. GI recommended no dilatation at this moment. Interval history:   - Patient examined at bedside. Today, the patient was alert and oriented to time place and person.   -Patient denied any headache, fever, chills, shortness of breath, chest pain, cough, urinary complaints, difficulty passing stool.   -Mercado's catheter was taken out yesterday. Patient on external urinary catheter. Patient has no difficulty micturition or dysuria.   -Patient awaiting repeat MRI. Neurology following.   -Patient on adult oral diet. Normal saline stopped. -Patient still complains of abdominal pain. VITALS:   98.6 (37) 23 90 157/76 100% RA      ABNL/PERTINENT LABS/RADIOLOGY/DIAGNOSTIC STUDIES:   10/7/22 04:43   Glucose, Random: 126 (H)   RDW: 16.2 (H)   Eosinophils %: 0 (L)   Seg Neutrophils: 79 (H)   Lymphocytes: 10 (L)   Absolute Lymph #: 0.90 (L)   Immature Granulocytes: 1 (H)   Platelet, Fluorescence: 114 (L)         PHYSICAL EXAM:   Constitutional: Appears well, no distress   EENT: PERRLA, EOMI, sclera clear. Neck: Supple, symmetrical, trachea midline. Respiratory: clear to auscultation, no wheezes or rales and unlabored breathing. No intercostal tenderness   Cardiovascular: regular rate and rhythm, normal S1, S2, no murmur noted and 2+ pulses throughout   Abdomen: Minimal tenderness on abdominal exam.   NEUROLOGIC: Awake, alert, oriented to name, place and time. .   Extremities:  peripheral pulses normal, no pedal edema, no clubbing or cyanosis   SKIN: normal coloration and turgor      MD CONSULTS/ASSESSMENT AND PLAN:   PLAN/MEDICAL DECISION MAKING:   Neurologic:    · Neuro intact   · Neuro checks per protocol   · no sedation   · LTM is showing patient having multiple seizures since last night as per neurology note. · Neuro checks per protocol   · no sedation, Ativan in the morning due to seizure activity. · Patient loaded with 2 g Keppra 1 mg Ativan in the morning. · Keppra maintenance dose changed from 1 g at bedtime to 1 g twice daily. · Precedex stopped on 10/4/2022. · Neuro planning to repeat MRI and possibly add another AED agent. · Repeat MRI on hold. Neuro planning for patient to get to baseline neurologically. Today, the patient was oriented than yesterday. Cardiovascular:   · Hemodynamically stable   · MAP goal >65   · No pressors   Pulmonary:   · Maintain oxygen sats >92%   · Pulmonary toilet   · Currently on room air   · Vent Information   · Ventilator Day(s): 1   · Ventilator ID: [id number]   · Equipment Changed: Expiratory Filter, HME   · Ventilator Initiate: Yes   · Ventilator Discontinue: Yes   · Vent Mode: AC/PRVC   GI/Nutrition   · Colace 100 mg oral daily   · Ulcer Prophylaxis: H2 blockers Pepcid 20 mg oral 2 times daily   · Diet:ADULT DIET; Regular   · CT scan abdomen shows dilated colon with stricturing sigmoid unchanged from 2022. · General surgery following, recommended serial abdominal examination. · General surgery recommended GI for recommendations and possible dilatation. No surgical intervention planned. · According to GI, sigmoid stricture likely from peritoneal carcinomatosis. · Patient having multiple liquidy bowel movements. GI plan to continue with current bowel regimen. · According to GI, colonic dilatation does not last long and would not be recommended. Colonic stent only went complete bowel obstruction. Renal/Fluid/Electrolyte   · IV Fluids: No IV fluids today.   I/O: In: 5428.6 [P.O.:945; I.V.:2317]   · Out: 4050 [Urine:4050]   · UOP: 2.5 cc/kg/hr   · Monitor electrolytes, replace PRN    ID   · WBC:    Lab Results   Component Value Date     WBC 8.8 10/07/2022   ·     · Tmax: Temp (24hrs), Av.6 °F (37 °C), Min:98.1 °F (36.7 °C), Max:99.3 °F (37.4 °C)   ·     · Antimicrobials: Vancomycin and Zosyn hematology:   Recent Labs     10/05/22   0500 10/06/22   0403 10/07/22   0443   HGB 12.7 12.5 12.3   · stable   Endocrine:    · glucose controlled - most recent BGL is    Recent Labs     10/05/22   0500 10/06/22   0403 10/07/22   0443   GLUCOSE 106* 113* 126*   ·     DVT Prophylaxis   · Eliquis       -Neurology recommended holding repeat MRI for now. Will repeat in the future.   -Will be transferred to neuro stepdown as patient is stable and can be managed there.   -Oncology consulted. Dr. Jerry Michael contacted regarding patient's condition. He has been following patient previously.        Discharge Needs:  PT, OT, ST, SW, and Case Management         CODE STATUS: Full Code           MEDICATIONS:   Scheduled Meds:   · amLODIPine 10 mg Oral Daily   · metoprolol tartrate 25 mg Oral BID   · potassium chloride 10 mEq Oral BID WC   · QUEtiapine 25 mg Oral BID   · lacosamide 100 mg Oral BID   · levETIRAcetam 1,000 mg Oral BID   · famotidine 20 mg Oral BID   · docusate 100 mg Oral Daily   · apixaban 5 mg Oral BID   · furosemide 20 mg Oral Daily   · lamoTRIgine 125 mg Oral BID   · sertraline 100 mg Oral Daily   · melatonin 5 mg Oral Nightly   · sodium chloride flush 5-40 mL IntraVENous 2 times per day      MAG 2 G IV X1   VANCO 1 G IV Q8H   ZOSYN 3375 MG IV Q8H   KCL 10 MEQ IV X6        Sepsis and Other Febrile Illness, without Focal Infection - Care Day 3 (10/5/2022) by Sandra Butt RN       Review Status Review Entered   Completed 10/6/2022 1747       Created By   Sandra Butt RN      Criteria Review      Care Day: 3 Care Date: 10/5/2022 Level of Care: ICU    Guideline Day 2    Level Of Care    (X) ICU or floor    10/6/2022 5:44 PM EDT by Madison Dawson      ICU    Clinical Status    ( ) * Hemodynamic stability    10/6/2022 5:44 PM EDT by Jailene Ivey        103  114  /86  Temp 98.2    ( ) * Hypoxemia absent    10/6/2022 5:44 PM EDT by Madison Dawson NC 2L O2    ( ) * Tachypnea absent    10/6/2022 5:44 PM EDT by Jailene Bernal      RR 22  23  24  26    Activity    (X) Activity as tolerated    Routes    (X) Possible IV fluids    (X) Parenteral or oral medications    10/6/2022 5:47 PM EDT by Soy Morgan      Zoloft 100 mg po qd  Vancocin 1000 mg IV q 8 hr  Vancomycin 750 mg IV q 8 hr   ml/hr IV    PRN  tylenol 650 mg po x 1  proventil 2.5 mg nebulizer x 2  versed 1 mg IV x 2  morphine 2 mg IV x 1  Zofran 4 mg IV x 2  KCl 10 meq IV x 7    10/6/2022 5:44 PM EDT by Jailene Bernal      Eliquis 5 mg po bid  Pepcid 20 mg IV x 1  Pepcid 20 mg po x 1  Lasix 20 mg po x 1  Lamictal 125 mg po bid  Keppra 1000 mg IV every 15 min x 2  Keppra 1000 mg IV po x 1  Ativan 1 mg IV x 1  Melatonin 5 mg po x 1  Zosyn 3375 mg q 8 hr IV  Effer K 40 meq po x 1    (X) Diet as tolerated    Interventions    (X) WBC    10/6/2022 5:47 PM EDT by Soy Morgan      see attachment    Medications    (X) Possible antimicrobial treatment    (X) Possible DVT prophylaxis    * Milestone   Additional Notes   DATE: 10/5/2022      PERTINENT UPDATES:   on 3L NC O2 and was comfortable (was extubated on 10/4). - She was following commands.    -Findings evaluation, the patient's EEG showed 4 electroclinical seizures with left hand Pain and left hand deviation. These were similar to yesterday seizures. Patient was loaded with 2 g Keppra and 1 g Ativan and the maintenance dose of Keppra was changed from 1 g at bedtime to 1 g twice daily.   - Neurology following - restarted home dose of Keppra 1000mg HS.   - General surgery following - recommended GI consult for possible dilation of sigmoid colon and no surgical intervention.           ABNL/PERTINENT LABS:   Glucose, Random: 106 (H)   RDW: 16.4 (H)   Eosinophils %: 0 (L)   Seg Neutrophils: 85 (H)   Lymphocytes: 12 (L)   Absolute Lymph #: 0.99 (L)   Monocytes: 2 (L)      BP (!) 152/91   Pulse 96   Temp 97.9 °F (36.6 °C) (Oral)   Resp 23   Ht 5' 5\" interictal EEG was abnormal due to diffuse polymorphic theta slowing suggesting mild to moderate encephalopathy. Continuous left hemispheric slowing suggested underlying structural defect. Frequent left central parietal sharp waves and lateralized periodic discharges (LPDs) conferred an increased risk for focal onset seizures. Monitoring was continued in order to record the patient's typical events. The EKG channel revealed no abnormalities. Internal Medicine   PLAN/MEDICAL DECISION MAKING:   Neurologic:    LTM is showing patient having multiple seizures since last night as per neurology note. Neuro checks per protocol   no sedation, Ativan in the morning due to seizure activity. Patient loaded with 2 g Keppra 1 mg Ativan in the morning. Keppra maintenance dose changed from 1 g at bedtime to 1 g twice daily. Precedex stopped on 10/4/2022. Cardiovascular:   Hemodynamically stable   MAP goal >65   Patient on no pressors. Pulmonary:   Maintain oxygen sats >92%   Pulmonary toilet   Patient extubated on 10/4/2022. Currently on 2 L nasal cannula oxygen. Breathing comfortably. GI/Nutrition   Colace 100 mg oral daily   Ulcer Prophylaxis: H2 blockers Pepcid 20 mg oral 2 times daily   Diet:ADULT DIET; Regular   CT scan abdomen shows dilated colon with stricturing sigmoid unchanged from June 2022. General surgery following, recommended serial abdominal examination. General surgery recommended GI for recommendations and possible dilatation. No surgical intervention planned. According to GI, sigmoid stricture likely from peritoneal carcinomatosis. Patient having multiple liquidy bowel movements. GI plan to continue with current bowel regimen. According to GI, colonic dilatation does not last long and would not be recommended. Colonic stent only went complete bowel obstruction.        Renal/Fluid/Electrolyte   IV Fluids: 0.9NS @ 100 mL/Hr    I/O: In: 3336.3 [I.V.:2299.8; NG/GT:40]   Out: 4988 [Urine:4988]   UOP: 3 cc/kg/hr   Monitor electrolytes, replace PRN    ID   WBC:    Lab Results   Component Value Date     WBC 8.4 10/05/2022       Tmax: Temp (24hrs), Av.3 °F (36.8 °C), Min:98 °F (36.7 °C), Max:98.5 °F (36.9 °C)       Antimicrobials: vancomycin and Zosyn continued. Hematology:   Recent Labs     10/03/22   1339 10/04/22   0546 10/05/22   0500   HGB 14.2 11.5* 12.7    stable   Endocrine:    glucose controlled - most recent BGL is    Recent Labs     10/03/22   1341 10/04/22   0546 10/05/22   0500   GLUCOSE 158* 123* 106*       DVT Prophylaxis   Eliquis       -PT OT following.    Discharge Needs:  PT, OT, ST, SW, and Case Management         CODE STATUS: Full Code                Medications   · levETIRAcetam 1,000 mg Oral BID   · famotidine 20 mg Oral BID   · docusate 100 mg Oral Daily   · apixaban 5 mg Oral BID   · furosemide 20 mg Oral Daily   · lamoTRIgine 125 mg Oral BID   · sertraline 100 mg Oral Daily   · melatonin 5 mg Oral Nightly   · sodium chloride flush 5-40 mL IntraVENous 2 times per day   · piperacillin-tazobactam 3,375 mg IntraVENous Q8H   · vancomycin (VANCOCIN) intermittent dosing (placeholder) Other RX Placeholder   · vancomycin 750 mg IntraVENous Q8H

## 2022-10-10 NOTE — PLAN OF CARE
Notified on-call resident for Dr. Garcia Ip of the following via secure message: Patient has rash on her buttocks from stools and wearing a brief. Brief is now off, zinc cream to affected area. 1725pm Notified on-call resident for Dr. Garcia Ip of the following via secure message: Patient c/o having diarrhea x2 and everytime she urinates she stools a little bit. Patient has had loose stools since at least Friday. Colace is ordered daily. Last dose was given on 10/8/22 (we've been holding). Patient is requesting immodium. Notified that patient is reporting as watery.

## 2022-10-10 NOTE — PROGRESS NOTES
Notified on call internal med resident of the patient's critical potassium level. Awaiting response/new orders. 9977; notified the internal med resident that the patient is refusing the IV potassium treatment but will accept PO potassium replacement.

## 2022-10-10 NOTE — PROGRESS NOTES
[facility]  Internal Medicine Teaching Residency Program  Inpatient Daily Progress Note  ______________________________________________________________________________    Patient: Jose R Singletary [facility]  YOB: 1965   KDE:[id number]    Acct: [de-identified]     Room: [facility]0140-01  Admit date: 10/3/2022  Today's date: 10/10/22  Number of days in the hospital: 7    SUBJECTIVE   Admitting Diagnosis: Septicemia ([address]  CC: Altered mental status    Pt examined at bedside. Chart & results reviewed. No acute events overnight  Afebrile, hemodynamically stable, saturating well on room air  Patient still complaining of auditory hallucinations  LTME was discontinued yesterday as per neurology  Potassium 2.9 this morning, replaced  MRI brain similar compared to the previous one, no signs of PRES or metastasis to brain. EKG on 10/10/2022 showed T wave inversions, and fusion complexes. Cardiology consulted      ROS:  Constitutional:  negative for chills, fevers, sweats  Respiratory:  negative for cough, dyspnea on exertion, hemoptysis, shortness of breath, wheezing  Cardiovascular:  negative for chest pain, chest pressure/discomfort, lower extremity edema, palpitations  Gastrointestinal:  negative for abdominal pain, constipation, diarrhea, nausea, vomiting  Neurological:  negative for dizziness, headache, positive for auditory hallucinations  BRIEF HISTORY     66-year-old female presented with chief complaints of altered mental status and ED. Was found drowsy by daughter on 10/2/2022. She was brought in by EMS with altered mental status opening eyes to painful stimuli and low GCS. In the ER she was meeting SIRS criteria and decision was made to intubate the patient for airway protection. Before intubation Narcan was also tried as there was a suspicion for acute toxic metabolic encephalopathy due to prescription opioid use. She was transferred to ICU for further management. In the ICU initially after intubation the patient was combative and not intolerant of the ET tube so she was sedated with propofol. Also started on IV fluids for ongoing hypotension. She has known history of seizures and was on antiseizure medications at home. CT scan chest showed bilateral pulmonary infiltrates concerning for aspiration pneumonia and she was started on broad-spectrum antibiotics. Neurology was also consulted for seizure work-up. 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 She has hx of intra-abdominal bleeding secondary to splenic mass with GI infiltration s/p embolization of the spleen. 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 They recommend stent placement if patient goes into complete lower bowel obstruction. During the ICU stay, the patient was on LTME which showed 6 subclinical seizures over 24 hours with left hand flapping and left head deviation during for electroclinical seizures. The patient was started with home dose of Keppra with loading dose 2 g and 1 mg Ativan for seizures and maintenance dose of Keppra increased to 1 g twice daily. Transferred out of ICU. Ongoing neurology work-up for possible press versus intracranial mets. OBJECTIVE     Vital Signs:  BP (!) 152/88   Pulse (!) 109   Temp 99.2 °F (37.3 °C) (Oral)   Resp 19   Ht 5' 5\" (1.651 m)   Wt 151 lb 3.8 oz (68.6 kg)   SpO2 95%   BMI 25.17 kg/m²     Temp (24hrs), Av °F (36.7 °C), Min:97 °F (36.1 °C), Max:99.2 °F (37.3 °C)    In: 3300   Out: -     Physical Exam:  Constitutional: This is a well developed, well nourished, 25-29.9 - Overweight 59y.o. year old female who is alert, oriented, cooperative and in no apparent distress. Head:normocephalic and atraumatic.     Respiratory:  Breath sounds bilaterally were clear to auscultation. There were no wheezes, rhonchi or rales. There is no intercostal retraction or use of accessory muscles. Cardiovascular: Regular without murmur, clicks, gallops or rubs. Abdomen: Slightly rounded and soft without organomegaly. No rebound, rigidity or guarding was appreciated. Musculoskeletal: No gross muscle weakness. Extremities:  No lower extremity edema, ulcerations, tenderness, varicosities or erythema. Muscle size, tone and strength are normal.  No involuntary movements are noted. Skin:  Warm and dry. Good color, turgor and pigmentation. No lesions or scars.   No cyanosis or clubbing  Neurological/Psychiatric: The patient's general behavior, level of consciousness, thought content and emotional status is normal.        Medications:  Scheduled Medications:    potassium chloride  40 mEq Oral Once    amLODIPine  10 mg Oral Daily    metoprolol tartrate  25 mg Oral BID    magnesium sulfate  2,000 mg IntraVENous Once    potassium chloride  10 mEq Oral BID WC    QUEtiapine  25 mg Oral BID    levoFLOXacin  500 mg Oral Daily    lacosamide  100 mg Oral BID    levETIRAcetam  1,000 mg Oral BID    famotidine  20 mg Oral BID    docusate  100 mg Oral Daily    apixaban  5 mg Oral BID    furosemide  20 mg Oral Daily    lamoTRIgine  125 mg Oral BID    sertraline  100 mg Oral Daily    melatonin  5 mg Oral Nightly    sodium chloride flush  5-40 mL IntraVENous 2 times per day     Continuous Infusions:    sodium chloride Stopped (10/07/22 0503)     PRN Medicationssodium chloride flush, 10 mL, PRN  LORazepam, 1 mg, Q4H PRN  oxyCODONE-acetaminophen, 1 tablet, Q4H PRN  morphine, 2 mg, Q4H PRN  albuterol, 2.5 mg, Q6H PRN  racepinephrine HCl, 11.25 mg, Q3H PRN  sodium chloride nebulizer, 3 mL, Q4H PRN  sodium chloride flush, 5-40 mL, PRN  sodium chloride, , PRN  ondansetron, 4 mg, Q8H PRN   Or  ondansetron, 4 mg, Q6H PRN  polyethylene glycol, 17 g, Daily PRN  acetaminophen, 650 mg, Q6H PRN Or  acetaminophen, 650 mg, Q6H PRN        Diagnostic Labs:  CBC:   Recent Labs     10/08/22  0412 10/09/22  0422 10/10/22  0351   WBC 7.7 8.3 8.6   RBC 4.30 4.13 3.89*   HGB 12.6 12.4 11.7*   HCT 39.6 37.9 36.1*   MCV 92.1 91.8 92.8   RDW 16.4* 16.3* 16.6*   PLT See Reflexed IPF Result 146 192     BMP:   Recent Labs     10/07/22  1429 10/08/22  0412 10/09/22  0422 10/10/22  0351   NA  --  137 141 139   K 3.9 3.5* 3.5* 2.9*   CL  --  103 106 104   CO2  --  21 21 24   PHOS 3.2 4.0  --   --    BUN  --  6 11 10   CREATININE  --  1.03* 1.05* 1.10*     BNP: No results for input(s): BNP in the last 72 hours. PT/INR: No results for input(s): PROTIME, INR in the last 72 hours. APTT: No results for input(s): APTT in the last 72 hours. CARDIAC ENZYMES: No results for input(s): CKMB, CKMBINDEX, TROPONINI in the last 72 hours. Invalid input(s): CKTOTAL;3  FASTING LIPID PANEL:  Lab Results   Component Value Date    CHOL 131 03/10/2021    HDL 33 (L) 03/10/2021    TRIG 147 03/10/2021     LIVER PROFILE: No results for input(s): AST, ALT, ALB, BILIDIR, BILITOT, ALKPHOS in the last 72 hours. MICROBIOLOGY:   Lab Results   Component Value Date/Time    CULTURE NO GROWTH 5 DAYS 10/03/2022 02:44 PM       Imaging:    CT HEAD WO CONTRAST    Result Date: 10/3/2022  1. No acute intracranial abnormality. 2. No evidence for significant stenosis or occlusion. 3. Consolidation in the visualized lungs bilaterally that is worse on the right compared to the left. Correlate with report from dedicated CT chest, abdomen, and pelvis for further discussion. XR CHEST PORTABLE    Result Date: 10/5/2022  Interval extubation. Interstitial opacities have increased in the interval, for which developing edema should be considered. XR CHEST PORTABLE    Result Date: 10/3/2022  1. Endotracheal tube tip lies 5 cm above the alejandro. 2. Enteric tube in the stomach with the tip at the level of the proximal to mid gastric body.  3. Stable right mid I a frame elevation resulting in right hemithorax volume loss. 4. There is progressive curvilinear right lung base opacity which may represent progressive atelectasis. 5. Stable perihilar interstitial reticulonodular densities. No acute consolidation or pulmonary edema. CTA HEAD NECK W CONTRAST    Result Date: 10/3/2022  1. No acute intracranial abnormality. 2. No evidence for significant stenosis or occlusion. 3. Consolidation in the visualized lungs bilaterally that is worse on the right compared to the left. Correlate with report from dedicated CT chest, abdomen, and pelvis for further discussion. CT CHEST ABDOMEN PELVIS W CONTRAST    Result Date: 10/3/2022  1. Progressive bibasilar and left lower lobe consolidative change which may represent atelectasis versus superimposed pneumonia. 2. Redemonstration of thoracic and abdominal metastatic disease with mixed change. Most lesions appear stable with a few interval progression (right axillary metastatic lymphadenopathy. Retroperitoneal left periaortic metastatic lymphadenopathy, pelvic presacral cystic mass). 3. Redemonstration of peritoneal carcinomatosis. No significant ascites. 4. Stable blastic skeletal metastasis. 5. Moderate transverse and descending colonic distention with liquid stool. Abrupt transition in the mid sigmoid colon which may relate to malignant partial obstruction or stricture. Mild interval improvement of colonic edema. No evidence of colonic perforation or pneumatosis. MRI BRAIN W WO CONTRAST    Result Date: 10/9/2022  1. Overall, findings are similar compared to brain MRI done October 4, 2022 with redemonstration of patchy areas of FLAIR hyperintense signal in the left posterior frontal lobe and right greater than left occipital lobes which may represent small old infarcts. 2.  No acute stroke, intracranial hemorrhage or abnormal enhancement. 3.  Mild chronic small vessel ischemic disease.      MRI BRAIN WO CONTRAST    Result Date: 10/4/2022  1. New subtle focus of T2/FLAIR hyperintensity in the lateral left frontal cortex and subcortical white matter is nonspecific and may reflect sequela of prior ischemia or mild posterior reversal encephalopathy syndrome. Short interval follow-up is recommended. 2. No evidence of acute infarct or intracranial hemorrhage. 3. Stable small foci of right occipital encephalomalacia in keeping with sequela of prior insult. ASSESSMENT & PLAN     ASSESSMENT / PLAN:     Principal Problem:    Septicemia ([address]  Active Problems:    Malignant neoplasm of ovary (HCC)    Seizure ([address]    Acute encephalopathy    Seizure disorder, nonconvulsive, with status epilepticus ([address]    Cancer, metastatic to bone (HCC)    Chronic deep vein thrombosis (DVT) of femoral vein of left lower extremity (HCC)    Iron deficiency anemia secondary to inadequate dietary iron intake    AMS (altered mental status)    Gastroesophageal reflux disease    Metabolic encephalopathy    Breakthrough seizure ([address]  Resolved Problems:    Type 2 diabetes mellitus without complication, without long-term current use of insulin (HCC)     Acute metabolic encephalopathy  - Possibly secondary to opioid use versus possible seizure-like activity  - Neurology consulted, LTME discontinued, suggests underlying structural defect and increased risk for focal onset seizures. -Mentation improved, ongoing neurology work-up. - Neurology added Seroquel 25 mg at night, will increase it to twice daily this morning     Subclinical seizures  - Neurology consulted recommends Keppra 1 g twice daily, Vimpat 100 mg twice daily and Lamictal 125 mg twice daily.  - MRI brain shows no acute infarct or hemorrhage and stable small foci of right occipital encephalomalacia.  -Follow-up MRI brain is stable compared to prior image, no obvious evidence of PRES, no evidence of metastatic lesions to the brain.     Aspiration pneumonia  - Possibly secondary to altered mentation and acute metabolic encephalopathy  - Continue Levaquin  - Supplemental oxygen therapy as needed. 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 - Relapse in last relapse in 2014, lost to follow-up since  - Metastatic relapse again, biopsy confirmed ovarian cancer recurrence with intra-abdominal and splenic involvement as well as lung metastasis  - Currently on active chemotherapy last treatment on 09/29/2022, hematology/oncology continues to follow the patient. Lower extremity lymphedema  - Continue Lasix 20 mg daily    Primary hypertension  - Started on Norvasc 10 mg daily  - Monitor blood pressure    History of DVT/PE  - Continue anticoagulation with Eliquis 5 mg twice daily      DVT ppx : Eliquis 5 twice daily  GI ppx: Pepcid    Discharge Planning / SW:  consulted    Radha Sandoval MD  Internal Medicine Resident, PGY-1  [facility];  [place], [place]  10/10/2022, 7:41 AM

## 2022-10-11 LAB
ABSOLUTE EOS #: 0.34 K/UL (ref 0–0.44)
ABSOLUTE IMMATURE GRANULOCYTE: 0.04 K/UL (ref 0–0.3)
ABSOLUTE LYMPH #: 0.87 K/UL (ref 1.1–3.7)
ABSOLUTE MONO #: 1.02 K/UL (ref 0.1–1.2)
ANION GAP SERPL CALCULATED.3IONS-SCNC: 10 MMOL/L (ref 9–17)
BASOPHILS # BLD: 0 % (ref 0–2)
BASOPHILS ABSOLUTE: <0.03 K/UL (ref 0–0.2)
BUN BLDV-MCNC: 13 MG/DL (ref 6–20)
CALCIUM SERPL-MCNC: 8.6 MG/DL (ref 8.6–10.4)
CHLORIDE BLD-SCNC: 103 MMOL/L (ref 98–107)
CO2: 25 MMOL/L (ref 20–31)
CREAT SERPL-MCNC: 1.15 MG/DL (ref 0.5–0.9)
EOSINOPHILS RELATIVE PERCENT: 4 % (ref 1–4)
GFR SERPL CREATININE-BSD FRML MDRD: 55 ML/MIN/1.73M2
GLUCOSE BLD-MCNC: 124 MG/DL (ref 70–99)
HCT VFR BLD CALC: 33.3 % (ref 36.3–47.1)
HEMOGLOBIN: 11.3 G/DL (ref 11.9–15.1)
IMMATURE GRANULOCYTES: 0 %
LYMPHOCYTES # BLD: 9 % (ref 24–43)
MCH RBC QN AUTO: 31 PG (ref 25.2–33.5)
MCHC RBC AUTO-ENTMCNC: 33.9 G/DL (ref 28.4–34.8)
MCV RBC AUTO: 91.5 FL (ref 82.6–102.9)
MONOCYTES # BLD: 11 % (ref 3–12)
NRBC AUTOMATED: 0 PER 100 WBC
PDW BLD-RTO: 17.7 % (ref 11.8–14.4)
PLATELET # BLD: 456 K/UL (ref 138–453)
PMV BLD AUTO: 12.1 FL (ref 8.1–13.5)
POTASSIUM SERPL-SCNC: 3.7 MMOL/L (ref 3.7–5.3)
RBC # BLD: 3.64 M/UL (ref 3.95–5.11)
RBC # BLD: ABNORMAL 10*6/UL
SEG NEUTROPHILS: 76 % (ref 36–65)
SEGMENTED NEUTROPHILS ABSOLUTE COUNT: 7.15 K/UL (ref 1.5–8.1)
SODIUM BLD-SCNC: 138 MMOL/L (ref 135–144)
WBC # BLD: 9.4 K/UL (ref 3.5–11.3)

## 2022-10-11 PROCEDURE — 36415 COLL VENOUS BLD VENIPUNCTURE: CPT

## 2022-10-11 PROCEDURE — 99232 SBSQ HOSP IP/OBS MODERATE 35: CPT | Performed by: INTERNAL MEDICINE

## 2022-10-11 PROCEDURE — 97535 SELF CARE MNGMENT TRAINING: CPT

## 2022-10-11 PROCEDURE — 80048 BASIC METABOLIC PNL TOTAL CA: CPT

## 2022-10-11 PROCEDURE — 2580000003 HC RX 258: Performed by: STUDENT IN AN ORGANIZED HEALTH CARE EDUCATION/TRAINING PROGRAM

## 2022-10-11 PROCEDURE — APPSS30 APP SPLIT SHARED TIME 16-30 MINUTES: Performed by: NURSE PRACTITIONER

## 2022-10-11 PROCEDURE — 97110 THERAPEUTIC EXERCISES: CPT

## 2022-10-11 PROCEDURE — 99231 SBSQ HOSP IP/OBS SF/LOW 25: CPT | Performed by: PSYCHIATRY & NEUROLOGY

## 2022-10-11 PROCEDURE — 94761 N-INVAS EAR/PLS OXIMETRY MLT: CPT

## 2022-10-11 PROCEDURE — 2060000000 HC ICU INTERMEDIATE R&B

## 2022-10-11 PROCEDURE — 6370000000 HC RX 637 (ALT 250 FOR IP)

## 2022-10-11 PROCEDURE — 6370000000 HC RX 637 (ALT 250 FOR IP): Performed by: NURSE PRACTITIONER

## 2022-10-11 PROCEDURE — 85025 COMPLETE CBC W/AUTO DIFF WBC: CPT

## 2022-10-11 PROCEDURE — 97116 GAIT TRAINING THERAPY: CPT

## 2022-10-11 PROCEDURE — 6370000000 HC RX 637 (ALT 250 FOR IP): Performed by: STUDENT IN AN ORGANIZED HEALTH CARE EDUCATION/TRAINING PROGRAM

## 2022-10-11 RX ORDER — CHOLECALCIFEROL (VITAMIN D3) 125 MCG
10 CAPSULE ORAL ONCE
Status: DISCONTINUED | OUTPATIENT
Start: 2022-10-11 | End: 2022-10-14

## 2022-10-11 RX ORDER — FAMOTIDINE 20 MG/1
20 TABLET, FILM COATED ORAL DAILY
Status: DISCONTINUED | OUTPATIENT
Start: 2022-10-12 | End: 2022-10-16

## 2022-10-11 RX ADMIN — APIXABAN 5 MG: 5 TABLET, FILM COATED ORAL at 08:40

## 2022-10-11 RX ADMIN — SODIUM CHLORIDE, PRESERVATIVE FREE 10 ML: 5 INJECTION INTRAVENOUS at 08:40

## 2022-10-11 RX ADMIN — LACOSAMIDE 100 MG: 100 TABLET, FILM COATED ORAL at 19:52

## 2022-10-11 RX ADMIN — OXYCODONE HYDROCHLORIDE AND ACETAMINOPHEN 1 TABLET: 5; 325 TABLET ORAL at 20:53

## 2022-10-11 RX ADMIN — SERTRALINE 100 MG: 50 TABLET, FILM COATED ORAL at 08:39

## 2022-10-11 RX ADMIN — QUETIAPINE FUMARATE 25 MG: 25 TABLET ORAL at 08:39

## 2022-10-11 RX ADMIN — APIXABAN 5 MG: 5 TABLET, FILM COATED ORAL at 19:52

## 2022-10-11 RX ADMIN — LEVETIRACETAM 1000 MG: 500 TABLET, FILM COATED ORAL at 08:41

## 2022-10-11 RX ADMIN — LAMOTRIGINE 125 MG: 100 TABLET ORAL at 08:39

## 2022-10-11 RX ADMIN — LORAZEPAM 1 MG: 1 TABLET ORAL at 15:11

## 2022-10-11 RX ADMIN — METOPROLOL TARTRATE 25 MG: 25 TABLET ORAL at 08:39

## 2022-10-11 RX ADMIN — AMLODIPINE BESYLATE 10 MG: 10 TABLET ORAL at 08:40

## 2022-10-11 RX ADMIN — LEVETIRACETAM 1000 MG: 500 TABLET, FILM COATED ORAL at 19:52

## 2022-10-11 RX ADMIN — OXYCODONE HYDROCHLORIDE AND ACETAMINOPHEN 1 TABLET: 5; 325 TABLET ORAL at 16:19

## 2022-10-11 RX ADMIN — ANTI-FUNGAL POWDER MICONAZOLE NITRATE TALC FREE: 1.42 POWDER TOPICAL at 20:01

## 2022-10-11 RX ADMIN — METOPROLOL TARTRATE 25 MG: 25 TABLET ORAL at 19:52

## 2022-10-11 RX ADMIN — FAMOTIDINE 20 MG: 20 TABLET, FILM COATED ORAL at 08:40

## 2022-10-11 RX ADMIN — LORAZEPAM 1 MG: 1 TABLET ORAL at 00:33

## 2022-10-11 RX ADMIN — Medication 5 MG: at 19:52

## 2022-10-11 RX ADMIN — ANTI-FUNGAL POWDER MICONAZOLE NITRATE TALC FREE: 1.42 POWDER TOPICAL at 08:43

## 2022-10-11 RX ADMIN — LACOSAMIDE 100 MG: 100 TABLET, FILM COATED ORAL at 08:40

## 2022-10-11 RX ADMIN — QUETIAPINE FUMARATE 25 MG: 25 TABLET ORAL at 19:52

## 2022-10-11 RX ADMIN — LORAZEPAM 1 MG: 1 TABLET ORAL at 19:52

## 2022-10-11 RX ADMIN — SODIUM CHLORIDE, PRESERVATIVE FREE 10 ML: 5 INJECTION INTRAVENOUS at 20:01

## 2022-10-11 RX ADMIN — LAMOTRIGINE 125 MG: 100 TABLET ORAL at 19:52

## 2022-10-11 RX ADMIN — OXYCODONE HYDROCHLORIDE AND ACETAMINOPHEN 1 TABLET: 5; 325 TABLET ORAL at 08:39

## 2022-10-11 RX ADMIN — POTASSIUM CHLORIDE 10 MEQ: 1500 TABLET, EXTENDED RELEASE ORAL at 08:39

## 2022-10-11 ASSESSMENT — PAIN DESCRIPTION - DESCRIPTORS
DESCRIPTORS: DULL
DESCRIPTORS: THROBBING

## 2022-10-11 ASSESSMENT — PAIN DESCRIPTION - ORIENTATION
ORIENTATION: MID

## 2022-10-11 ASSESSMENT — PAIN SCALES - GENERAL
PAINLEVEL_OUTOF10: 8
PAINLEVEL_OUTOF10: 7
PAINLEVEL_OUTOF10: 4
PAINLEVEL_OUTOF10: 8
PAINLEVEL_OUTOF10: 7
PAINLEVEL_OUTOF10: 7
PAINLEVEL_OUTOF10: 4
PAINLEVEL_OUTOF10: 1

## 2022-10-11 ASSESSMENT — PAIN - FUNCTIONAL ASSESSMENT
PAIN_FUNCTIONAL_ASSESSMENT: ACTIVITIES ARE NOT PREVENTED
PAIN_FUNCTIONAL_ASSESSMENT: PREVENTS OR INTERFERES SOME ACTIVE ACTIVITIES AND ADLS
PAIN_FUNCTIONAL_ASSESSMENT: ACTIVITIES ARE NOT PREVENTED

## 2022-10-11 ASSESSMENT — PAIN DESCRIPTION - LOCATION
LOCATION: BACK

## 2022-10-11 ASSESSMENT — PAIN DESCRIPTION - ONSET: ONSET: ON-GOING

## 2022-10-11 ASSESSMENT — PAIN DESCRIPTION - FREQUENCY: FREQUENCY: CONTINUOUS

## 2022-10-11 NOTE — PLAN OF CARE
Problem: Respiratory - Adult  Goal: Achieves optimal ventilation and oxygenation  10/11/2022 1815 by Bebo Ramirez RN  Outcome: Progressing  10/11/2022 0506 by Austin Munoz RN  Outcome: Progressing  Flowsheets (Taken 10/10/2022 2000)  Achieves optimal ventilation and oxygenation: Assess for changes in respiratory status     Problem: Discharge Planning  Goal: Discharge to home or other facility with appropriate resources  10/11/2022 1815 by Bebo Ramirez RN  Outcome: Progressing  10/11/2022 0506 by Austin Munoz RN  Outcome: Progressing  Flowsheets (Taken 10/10/2022 2000)  Discharge to home or other facility with appropriate resources: Identify barriers to discharge with patient and caregiver     Problem: Safety - Adult  Goal: Free from fall injury  10/11/2022 1815 by Bebo Ramirez RN  Outcome: Progressing  10/11/2022 0506 by Austin Munoz RN  Outcome: Progressing     Problem: ABCDS Injury Assessment  Goal: Absence of physical injury  10/11/2022 0506 by Austin Munoz RN  Outcome: Progressing     Problem: Pain  Goal: Verbalizes/displays adequate comfort level or baseline comfort level  Outcome: Progressing

## 2022-10-11 NOTE — CARE COORDINATION
Padmini discussed in IDR  Met with pt this a.m. was awake and cooperative. Pt states since they changed her room she is feeling better. Pt states initially she had some anxiety , it brought back memories of her son who recently passed away. Pt states she is not ready for any counseling right now as she is still having some hallucinations. Emotional support provided.

## 2022-10-11 NOTE — PLAN OF CARE
Problem: Respiratory - Adult  Goal: Achieves optimal ventilation and oxygenation  Outcome: Progressing  Flowsheets (Taken 10/10/2022 2000)  Achieves optimal ventilation and oxygenation: Assess for changes in respiratory status     Problem: Discharge Planning  Goal: Discharge to home or other facility with appropriate resources  Outcome: Progressing  Flowsheets (Taken 10/10/2022 2000)  Discharge to home or other facility with appropriate resources: Identify barriers to discharge with patient and caregiver     Problem: Safety - Adult  Goal: Free from fall injury  Outcome: Progressing     Problem: ABCDS Injury Assessment  Goal: Absence of physical injury  Outcome: Progressing     Problem: Chronic Conditions and Co-morbidities  Goal: Patient's chronic conditions and co-morbidity symptoms are monitored and maintained or improved  Recent Flowsheet Documentation  Taken 10/10/2022 2000 by Kallie Loredo 34 - Patient's Chronic Conditions and Co-Morbidity Symptoms are Monitored and Maintained or Improved: Monitor and assess patient's chronic conditions and comorbid symptoms for stability, deterioration, or improvement

## 2022-10-11 NOTE — PROGRESS NOTES
PALLIATIVE CARE NURSING ASSESSMENT    Patient: Fani Menjivar [facility]  Room: 3354/5468-79    Reason For Consult   Goals of care evaluation  Distress management  Guidance and support  Facilitate communications  Assistance in coordinating care    Code Status: Full Code    Summary:  Palliative Care support meeting with patient. She expresses that she will not go to SNF for rehab  She will consider ARU. Nurse and  notified of my conversation  Pt is agreeable to receive information about palliative care outpatient. Explained palliative care outpatient and provided patient with pamphlets. Continue Current therapy  Palliative Care will continue to follow. Impression: Farrukh Greenfield is a 59y.o. year old female  has a past medical history of Anemia, Bleeding, Cervical cancer ([address] Depression, Diabetes mellitus ([address] GERD (gastroesophageal reflux disease), Hx of blood clots, Hypertension, Metastatic cancer ([address] Ovarian cancer ([address] Post chemo evaluation, PRES (posterior reversible encephalopathy syndrome), and Splenic lesion. .  Currently hospitalized for the management of seotucenua. The Palliative Care Team is following to assist with patient and family support, goals of care. Vital Signs  Blood pressure 124/64, pulse 85, temperature 98.1 °F (36.7 °C), temperature source Oral, resp. rate 16, height 5' 5\" (1.651 m), weight 151 lb 3.8 oz (68.6 kg), SpO2 93 %.     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)    Acute encephalopathy    PRES (posterior reversible encephalopathy syndrome)    Hemianopia, bitemporal    Encephalopathy acute    Seizure disorder, nonconvulsive, with status epilepticus ([address]    History of ovarian cancer    Pleural effusion    Bandemia    Cancer, metastatic to bone (HCC)    Intractable low back pain    Fatigue Chronic deep vein thrombosis (DVT) of femoral vein of left lower extremity (HCC)    Iron deficiency anemia secondary to inadequate dietary iron intake    Iron malabsorption    Gynecologic cancer (HCC)    Thrombocytosis    History of deep venous thrombosis (DVT) of distal vein of left lower extremity: On Eliquis    Pelvic mass    Acute on chronic anemia    AMS (altered mental status)    Lactic acidosis    Anxiety    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)    Diabetes mellitus (HCC)    Gastroesophageal reflux disease    Recurrent major depressive disorder, in partial remission (HCC)    Ileus (HCC)    Pericardial effusion    Acute respiratory failure with hypoxia (HCC)    Metabolic encephalopathy    Confusion    Urinary tract infection without hematuria    Seizure disorder (HCC)    Breakthrough seizure (HCC)    Leg swelling    Iron deficiency    [address]pticemia [address] [address]       Palliative Interaction: Reviewed patient's course of staff. Update with bedside nurse Grady Deutsch. I went to visit patient. She is awake and alert resting in bed. She repositions self in bed during our conversation. I introduced myself and my role. She initally relates \"I am not interested. \"  I let her know that I am following up and her to support her and family while here as needed. Let her know I am not trying to sell her on anything. We talked about her wishes for discharge. She is short and adamant that she is not going to a SNF. Through conversation she relates she would consider a ARU, but wants to go home. She relates she feels she is ok to go home. She feels safe on her own. She is frustrated with people telling her what she needs to do. I let her know I would let her nurse and the  know of her decision for no SNF and she would consider ARU but is not saying she will go at this time.   I let her know  that I am not the one that determines if ARU is appropriate, but that I would want to talk about all possible options. I let her know that Palliative Care outpatient services would be available to her. Let her know about clinic visits vs them coming to her home for visits. Let her know that a goal of palliative care is to provide symptom management and quality of life for patient. Let her know that [id number] [facility] has clinic she can visit, but other agencies come to patient at home. She is receptive. I let her know I will bring by pamphlets for her to review. If she decides she wants a referral I can take care of that. If she wants to take some time she can always call the agency's direct the pamphlet has their numbers on them. Goals/Plan of care  Education/support to staff  Education/support to patient  Communications with primary service  Providing support for coping/adaptation/distress of patient  Continue with current plan of care  Validating patient/family distress  Continued communication updates  Encouraged patient to tell me her goals, her wants, and what she is not willing to do. Acknowledged her being  able to make her decisions.        Palliative Care Coordinator  Kallie ACOSTA, RN, ONN-CG    [facility] Office: [phone] [facility] Office: [phone]  [facility] Office: [phone]    For Symptom Management Clinic scheduling please call [phone]

## 2022-10-11 NOTE — PROGRESS NOTES
[facility]  Internal Medicine Teaching Residency Program  Inpatient Daily Progress Note  ______________________________________________________________________________    Patient: Kayy Gautam  YOB: 1965   WKM:[id number]    Acct: [de-identified]     Room: 0140/0140-01  Admit date: 10/3/2022  Today's date: 10/11/22  Number of days in the hospital: 8    SUBJECTIVE   Admitting Diagnosis: Septicemia ([address]  CC: Altered mental status    Pt examined at bedside. Chart & results reviewed. No acute events overnight  Afebrile, hemodynamically stable, saturating well on room air  Patient still complaining of auditory hallucinations  Patient had 2 episodes of diarrhea yesterday evening, no complaints today  She denies any nausea or vomiting, chest pain or any shortness of breath  Potassium 2.9 -->3.7  MRI brain similar compared to the previous one, no signs of PRES or metastasis to brain. Lasix discontinued, evaluate need as an outpatient      ROS:  Constitutional:  negative for chills, fevers, sweats  Respiratory:  negative for cough, dyspnea on exertion, hemoptysis, shortness of breath, wheezing  Cardiovascular:  negative for chest pain, chest pressure/discomfort, lower extremity edema, palpitations  Gastrointestinal:  negative for abdominal pain, constipation, diarrhea, nausea, vomiting  Neurological:  negative for dizziness, headache  BRIEF HISTORY     77-year-old female presented with chief complaints of altered mental status and ED. Was found drowsy by daughter on 10/2/2022. She was brought in by EMS with altered mental status opening eyes to painful stimuli and low GCS. In the ER she was meeting SIRS criteria and decision was made to intubate the patient for airway protection. Before intubation Narcan was also tried as there was a suspicion for acute toxic metabolic encephalopathy due to prescription opioid use.   She was transferred to ICU for further management. In the ICU initially after intubation the patient was combative and not intolerant of the ET tube so she was sedated with propofol. Also started on IV fluids for ongoing hypotension. She has known history of seizures and was on antiseizure medications at home. CT scan chest showed bilateral pulmonary infiltrates concerning for aspiration pneumonia and she was started on broad-spectrum antibiotics. Neurology was also consulted for seizure work-up. 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 She has hx of intra-abdominal bleeding secondary to splenic mass with GI infiltration s/p embolization of the spleen. 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 They recommend stent placement if patient goes into complete lower bowel obstruction. During the ICU stay, the patient was on LTME which showed 6 subclinical seizures over 24 hours with left hand flapping and left head deviation during for electroclinical seizures. The patient was started with home dose of Keppra with loading dose 2 g and 1 mg Ativan for seizures and maintenance dose of Keppra increased to 1 g twice daily. OBJECTIVE     Vital Signs:  /69   Pulse 90   Temp 97.9 °F (36.6 °C) (Oral)   Resp 22   Ht 5' 5\" (1.651 m)   Wt 151 lb 3.8 oz (68.6 kg)   SpO2 94%   BMI 25.17 kg/m²     Temp (24hrs), Av °F (36.7 °C), Min:97.8 °F (36.6 °C), Max:98.6 °F (37 °C)    In: 575.5   Out: -     Physical Exam:  Constitutional: This is a well developed, well nourished, 25-29.9 - Overweight 59y.o. year old female who is alert, oriented, cooperative and in no apparent distress. Head:normocephalic and atraumatic. Respiratory: Breath sounds bilaterally were clear to auscultation. There were no wheezes, rhonchi or rales.  There is no intercostal retraction or use of accessory muscles. Cardiovascular: Regular without murmur, clicks, gallops or rubs. Abdomen: Slightly rounded and soft without organomegaly. No rebound, rigidity or guarding was appreciated. Musculoskeletal:  No gross muscle weakness. Extremities:  No lower extremity edema, ulcerations, tenderness, varicosities or erythema. Muscle size, tone and strength are normal.  No involuntary movements are noted. Skin:  Warm and dry. Good color, turgor and pigmentation. No lesions or scars.   No cyanosis or clubbing  Neurological/Psychiatric: The patient's general behavior, level of consciousness, thought content and emotional status is normal.        Medications:  Scheduled Medications:    melatonin  10 mg Oral Once    amLODIPine  10 mg Oral Daily    metoprolol tartrate  25 mg Oral BID    miconazole   Topical BID    potassium chloride  10 mEq Oral BID WC    QUEtiapine  25 mg Oral BID    lacosamide  100 mg Oral BID    levETIRAcetam  1,000 mg Oral BID    famotidine  20 mg Oral BID    docusate  100 mg Oral Daily    apixaban  5 mg Oral BID    lamoTRIgine  125 mg Oral BID    sertraline  100 mg Oral Daily    melatonin  5 mg Oral Nightly    sodium chloride flush  5-40 mL IntraVENous 2 times per day     Continuous Infusions:    sodium chloride Stopped (10/10/22 1232)     PRN Medicationssodium chloride flush, 10 mL, PRN  LORazepam, 1 mg, Q4H PRN  oxyCODONE-acetaminophen, 1 tablet, Q4H PRN  morphine, 2 mg, Q4H PRN  albuterol, 2.5 mg, Q6H PRN  racepinephrine HCl, 11.25 mg, Q3H PRN  sodium chloride nebulizer, 3 mL, Q4H PRN  sodium chloride flush, 5-40 mL, PRN  sodium chloride, , PRN  ondansetron, 4 mg, Q8H PRN   Or  ondansetron, 4 mg, Q6H PRN  polyethylene glycol, 17 g, Daily PRN  acetaminophen, 650 mg, Q6H PRN   Or  acetaminophen, 650 mg, Q6H PRN        Diagnostic Labs:  CBC:   Recent Labs     10/09/22  0422 10/10/22  0351 10/11/22  0457   WBC 8.3 8.6 9.4   RBC 4.13 3.89* 3.64*   HGB 12.4 11.7* 11.3* HCT 37.9 36.1* 33.3*   MCV 91.8 92.8 91.5   RDW 16.3* 16.6* 17.7*    192 456*     BMP:   Recent Labs     10/09/22  0422 10/10/22  0351 10/10/22  1438 10/11/22  0457    139  --  138   K 3.5* 2.9* 4.1 3.7    104  --  103   CO2 21 24  --  25   BUN 11 10  --  13   CREATININE 1.05* 1.10*  --  1.15*     BNP: No results for input(s): BNP in the last 72 hours. PT/INR: No results for input(s): PROTIME, INR in the last 72 hours. APTT: No results for input(s): APTT in the last 72 hours. CARDIAC ENZYMES: No results for input(s): CKMB, CKMBINDEX, TROPONINI in the last 72 hours. Invalid input(s): CKTOTAL;3  FASTING LIPID PANEL:  Lab Results   Component Value Date    CHOL 131 03/10/2021    HDL 33 (L) 03/10/2021    TRIG 147 03/10/2021     LIVER PROFILE: No results for input(s): AST, ALT, ALB, BILIDIR, BILITOT, ALKPHOS in the last 72 hours. MICROBIOLOGY:   Lab Results   Component Value Date/Time    CULTURE NO GROWTH 5 DAYS 10/03/2022 02:44 PM       Imaging:    XR CHEST PORTABLE    Result Date: 10/5/2022  Interval extubation. Interstitial opacities have increased in the interval, for which developing edema should be considered. MRI BRAIN W WO CONTRAST    Result Date: 10/9/2022  1. Overall, findings are similar compared to brain MRI done October 4, 2022 with redemonstration of patchy areas of FLAIR hyperintense signal in the left posterior frontal lobe and right greater than left occipital lobes which may represent small old infarcts. 2.  No acute stroke, intracranial hemorrhage or abnormal enhancement. 3.  Mild chronic small vessel ischemic disease. MRI BRAIN WO CONTRAST    Result Date: 10/4/2022  1. New subtle focus of T2/FLAIR hyperintensity in the lateral left frontal cortex and subcortical white matter is nonspecific and may reflect sequela of prior ischemia or mild posterior reversal encephalopathy syndrome. Short interval follow-up is recommended.  2. No evidence of acute infarct or intracranial hemorrhage. 3. Stable small foci of right occipital encephalomalacia in keeping with sequela of prior insult. ASSESSMENT & PLAN     ASSESSMENT / PLAN:     Principal Problem:    Septicemia ([facility] [address]  Active Problems:    Malignant neoplasm of ovary (HCC)    Seizure ([facility] [address]    Acute encephalopathy    Seizure disorder, nonconvulsive, with status epilepticus ([address]    Cancer, metastatic to bone (HCC)    Chronic deep vein thrombosis (DVT) of femoral vein of left lower extremity (HCC)    Iron deficiency anemia secondary to inadequate dietary iron intake    AMS (altered mental status)    Gastroesophageal reflux disease    Metabolic encephalopathy    Breakthrough seizure ([facility] [address]  Resolved Problems:    Type 2 diabetes mellitus without complication, without long-term current use of insulin (HCC)     Acute metabolic encephalopathy  - Possibly secondary to opioid use versus possible seizure-like activity  - Neurology consulted, LTME discontinued, suggests underlying structural defect and increased risk for focal onset seizures. -Mentation improved, ongoing neurology work-up. - Seroquel 25 mg twice daily     Subclinical seizures  - Neurology consulted recommends Keppra 1 g twice daily, Vimpat 100 mg twice daily and Lamictal 125 mg twice daily.  - MRI brain shows no acute infarct or hemorrhage and stable small foci of right occipital encephalomalacia.  -Follow-up MRI brain is stable compared to prior image, no obvious evidence of PRES, no evidence of metastatic lesions to the brain. Aspiration pneumonia  - Possibly secondary to altered mentation and acute metabolic encephalopathy  - Finished course of levaquin  - Supplemental oxygen therapy as needed. 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   - Relapse in last relapse in 2014, lost to follow-up since  - Metastatic relapse again, biopsy confirmed ovarian cancer recurrence with intra-abdominal and splenic involvement as well as lung metastasis  - Currently on active chemotherapy last treatment on 09/29/2022, hematology/oncology continues to follow the patient. Primary hypertension  - Started on Norvasc 10 mg daily  - Monitor blood pressure    History of DVT/PE  - Continue anticoagulation with Eliquis 5 mg twice daily    DVT ppx : Lovenox  GI ppx: Pepcid    PT/OT: Consulted  Discharge Planning / SW:  consulted    Marnie Rizvi MD  Internal Medicine Resident, PGY-1  [facility];  [place], [place]  10/11/2022, 7:41 AM

## 2022-10-11 NOTE — PROGRESS NOTES
Pharmacy Note     Renal Dose Adjustment    Freddie Haq is a 59 y.o. female. Pharmacist assessment of renally cleared medications. Recent Labs     10/10/22  0351 10/11/22  0457   BUN 10 13       Recent Labs     10/10/22  0351 10/11/22  0457   CREATININE 1.10* 1.15*       Estimated Creatinine Clearance: 51 mL/min (A) (based on SCr of 1.15 mg/dL (H)).     Height:   Ht Readings from Last 1 Encounters:   10/08/22 5' 5\" (1.651 m)     Weight:  Wt Readings from Last 1 Encounters:   10/07/22 151 lb 3.8 oz (68.6 kg)       The following medication dose has been adjusted based upon renal function per P&T Guidelines:             Famotidine 20 mg PO BID --> Famotidine 20 mg PO daily    Clint Rangel, PharmD, 10/11/2022 12:39 PM

## 2022-10-11 NOTE — PROGRESS NOTES
Neurology Nurse Practitioner Progress Note      INTERVAL HISTORY: This is a 59 y.o.  female admitted 10/3/2022 for acute onset altered mentation. This is a follow-up neurology progress note. The patient was examined and the chart was reviewed. Discussed with the RN. Patient reported that auditory hallucinations are getting better. She stays alert and oriented x 3. Denied any other new symptoms. HPI: Iliana Méndez is a 59 y.o. 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 Daughter reported that on the morning of arrival they let her mother sleep in. When they went in to check on her later patient was difficult to arouse. Patient's other daughter saw the patient vomiting on her bed through camera and then she became incontinent and behaving in an erratic way. Seizures were witnessed. EMS were called. Last known well was 2300 the night prior. Patient has prior history of PRES 10/2020 with new onset seizure disorder during that time. Reportedly patient has had multiple hospitalization for breakthrough seizures with negative work-up and AED adjustments. Currently she has been taking Keppra 1 g twice daily and Lamictal 125 mg twice daily. Neurology was consulted on 10/4 for ongoing disorientation and agitation, after extubation on 10/4.       melatonin  10 mg Oral Once    amLODIPine  10 mg Oral Daily    metoprolol tartrate  25 mg Oral BID    miconazole   Topical BID    QUEtiapine  25 mg Oral BID    lacosamide  100 mg Oral BID    levETIRAcetam  1,000 mg Oral BID    famotidine  20 mg Oral BID    docusate  100 mg Oral Daily    apixaban  5 mg Oral BID    lamoTRIgine  125 mg Oral BID    sertraline  100 mg Oral Daily    melatonin  5 mg Oral Nightly    sodium chloride flush  5-40 mL IntraVENous 2 times per day       Past Medical History:   Diagnosis Date    Anemia     Bleeding 10/2020    intra-abdominal bleeding -due to splenic mass with GI infiltration. Status post embolization    Cervical cancer (HCC)     Depression     Diabetes mellitus ([address]     GERD (gastroesophageal reflux disease)     Hx of blood clots     Hypertension     Metastatic cancer ([facility] [address] 10/2020    extensive intraabdominal and splenic involvement and lung mets. Ovarian cancer ([facility] [address]     low grade serous ovarian carcinoma    Post chemo evaluation     2007: Chemo via med onc (Dr. Callie Rutledge), 2008: Willeen Bennie due to rising CA-125, 2013: intraperitoneal chemo,12/2015: Ca125 - 25     PRES (posterior reversible encephalopathy syndrome)     Splenic lesion        Past Surgical History:   Procedure Laterality Date    ABSCESS DRAINAGE  2013    Franca rectal    ANUS SURGERY      ANAL FISSURECTOMY    CARDIAC CATHETERIZATION      COLECTOMY  03/2013    ex lap, tumor debulking, transverse colectomy w reanastamosis, subgastric omentectomy, intraperitoneal port placement    HYSTERECTOMY, TOTAL ABDOMINAL (CERVIX REMOVED)      IR EMBOLIZATION HEMORRHAGE  10/05/2020    intra-abdominal bleeding -due to splenic mass with GI infiltration. Status post embolization [employer] interlock coils x7. mri condtional 3t ok, safe immediately post implant. IR PORT PLACEMENT EQUAL OR GREATER THAN 5 YEARS  08/24/2020    IR PORT PLACEMENT EQUAL OR GREATER THAN 5 YEARS 8/24/2020 Bridgette Concepcion MD [facility] SPECIAL PROCEDURES    PORT SURGERY      IP Port    TONSILLECTOMY         PHYSICAL EXAM:    Blood pressure (!) 142/72, pulse 90, temperature 98.4 °F (36.9 °C), temperature source Oral, resp. rate 18, height 5' 5\" (1.651 m), weight 151 lb 3.8 oz (68.6 kg), SpO2 95 %. ROS: Auditory hallucinations        Neurological Examination:  Mental status   Patient reported that auditory hallucinations are getting better.   No hallucinations noted at this time. She was alert and oriented x 3; has been following simple commands   Cranial nerves Grossly intact   Motor function  Strength: Patient has been using all limbs purposefully and equally  Normal bulk and tone                Sensory function Grossly intact     Cerebellar No visible tremors   Reflex function Intact    Gait                  Not tested       DATA    Lab Results   Component Value Date    WBC 9.4 10/11/2022    RBC 3.64 (L) 10/11/2022    HGB 11.3 (L) 10/11/2022    HCT 33.3 (L) 10/11/2022     (H) 10/11/2022    ALT 9 10/03/2022    AST 18 10/03/2022     10/11/2022    K 3.7 10/11/2022    MG 1.7 10/10/2022    PHOS 4.0 10/08/2022     10/11/2022    AMMONIA 36 10/03/2022    CREATININE 1.15 (H) 10/11/2022    BUN 13 10/11/2022    CO2 25 10/11/2022    TSH 2.93 03/09/2021    INR 1.2 05/03/2022    [id number] 654 06/04/2022    FOLATE 8.7 06/04/2022    LABA1C 5.2 04/13/2022     Lab Results   Component Value Date    CHOL 131 03/10/2021     Lab Results   Component Value Date    TRIG 147 03/10/2021     Lab Results   Component Value Date    HDL 33 (L) 03/10/2021     Lab Results   Component Value Date    LDLCHOLESTEROL 69 03/10/2021     Lab Results   Component Value Date    VLDL NOT REPORTED 03/10/2021     Lab Results   Component Value Date    CHOLHDLRATIO 4.0 03/10/2021         DIAGNOSTIC DATA:  CT HEAD (10/3/2022): No acute intracranial abnormality     MRI BRAIN (10/4/2022):   1. New subtle focus of T2/FLAIR hyperintensity in lateral L frontal cortex & subcortical white matter    is nonspecific and may reflect sequela of prior infarct or mild PRES. 2. Stable small foci of R occipital encephalomalacia; sequela of prior insult. MRI BRAIN W/WO (10/9/2022):   Redemonstration of patchy FLAIR hyperintense signal in L posterior frontal lobe & R>L occipital    lobes, which may represent small old infarcts. CTA HEAD & NECK (10/3/2022):  No LVO      ECHO (2/22/2022): EF 55%      LTME (10/4 - 10/9/2022): Day 1: During the recording, the patient had 1 subclinical seizure lasting about 40 seconds originating from L hemisphere. The interictal EEG was abnormal due to diffuse polymorphic theta slowing suggesting mild to moderate encephalopathy. Continuous L hemispheric slowing suggested underlying structural defect. frequent L central parietal sharp waves and lateralized periodic discharges conferred an increased risk for focal onset seizures    Day 2: During the recording, the patient had 11 electroclinical seizures with L hand flapping & L head deviation. Electrographically, these seizures were similar to yesterday seizures. The interictal EEG was abnormal due to diffuse polymorphic theta slowing suggesting mild to moderate encephalopathy. Continuous L hemispheric slowing suggested underlying structural defect. Frequent L central parietal sharp waves & lateralized periodic discharges (LPDs) conferred an increased risk for focal onset seizures    Day 3: During the recording, the patient had 6 L hemispheric onset seizures. Electrographically, these seizures were similar to yesterday seizures. The interictal EEG was abnormal due to diffuse polymorphic theta slowing suggesting mild to moderate encephalopathy. Continuous L hemispheric slowing suggested underlying structural defect. Frequent L central parietal sharp waves & lateralized periodic discharges (LPDs) conferred an increased risk for focal onset seizures    Day 4 & 5: During this day of recording no events were recorded. Continuous L hemispheric slowing suggested underlying structural defect. Occasional L central parietal sharp waves & lateralized periodic discharges (LPDs) conferred an increased risk for focal onset seizures    Day 6: During this day of recording no events were recorded. Continuous L hemispheric slowing suggested underlying structural defect.  Previously seen L sided LPDs were not seen on today's recording                  IMPRESSION: 59 y.o.  female admitted with  Acute metabolic encephalopathy in the setting of HTN urgency (150/122 mm Hg), febrile illness with +SIRS criteria, aspiration pneumonia & subclinical status. Pt was A&Ox3 today; reported that her auditory hallucinations are getting better over time    Repeat MRI brain w/wo (10/9) - no PRES or mets; small old infarcts in L posterior frontal lobe & b/l occipital lobes (R>L) LTME - total of 18 subclinical seizures along with mild to moderate encephalopathy (last seizure on 10/6/2022)    Prior Hx PRES with new onset seizure disorder in 10/2020    Chronic DVT; on Eliquis 5 mg BID    Hx recurrent ovarian CA with peritoneal & lung mets, intra-abdominal bleeding d/t splenic mass with GI infiltration s/p embolization (10/2020); currently on active chemotherapy. Heme/Onc on bord    Comorbid conditions - HTN, DM, GERD, depression            PLAN:  Continue Vimpat 100 mg BID PO, Keppra 1 g BID PO & Lamictal 125 mg BID    Continue Seroquel 25 mg BID    Continue PT/OT    She needs to F/U with neurology within 4-6 weeks as OP    No further neurological testing is required at this time. Pt is neurologically stable for D/C    We will sign off. Please, call with any questions or concerns. Thank you    Please note that this note was generated using a voice recognition dictation software. Although every effort was made to ensure the accuracy of this automated transcription, some errors in transcription may have occurred.

## 2022-10-11 NOTE — PROGRESS NOTES
Physical Therapy  Facility/Department: [address] STEPDOWN  Daily Treatment note    Name: Tyler Girard  : 1965  MRN: [id number]  Date of Service: 10/11/2022    Discharge Recommendations:  Patient would benefit from continued therapy after discharge          Patient Diagnosis(es): The primary encounter diagnosis was Septicemia [facility]). Diagnoses of Altered mental status, unspecified altered mental status type, Metabolic encephalopathy, Seizure ([address] Seizure disorder ([address] Acute respiratory failure with hypoxia ([address] and Breakthrough seizure ([address] were also pertinent to this visit. Past Medical History:  has a past medical history of Anemia, Bleeding, Cervical cancer ([address] Depression, Diabetes mellitus ([address] GERD (gastroesophageal reflux disease), Hx of blood clots, Hypertension, Metastatic cancer ([address] Ovarian cancer ([address] Post chemo evaluation, PRES (posterior reversible encephalopathy syndrome), and Splenic lesion. Past Surgical History:  has a past surgical history that includes Hysterectomy, total abdominal; Port Surgery; Tonsillectomy; IR PORT PLACEMENT > 5 YEARS (2020); Anus surgery; Abscess Drainage (); colectomy (2013); IR EMBOLIZATION HEMORRHAGE (10/05/2020); and Cardiac catheterization. Assessment   Body Structures, Functions, Activity Limitations Requiring Skilled Therapeutic Intervention: Decreased functional mobility ; Decreased strength;Decreased cognition;Decreased safe awareness;Decreased endurance;Decreased balance  Assessment: Pt amb 80ft x2 RW with CG A, Pt continue to be impulsive and unsafe amb without assist. Limited by impulsiveness and poor safety awareness. Pt has no insight to deficits making her a high fall risk. Recomending continued therapy to address deficits. Pt will require 24hrs supervision and assistance with mobility upon discahrge.   Therapy Prognosis: Good  Activity Tolerance  Activity Tolerance: Patient tolerated treatment well;Patient limited by fatigue     Plan   Physcial Therapy Plan  General Plan:  (5-6x/wk)  Current Treatment Recommendations: Strengthening, Balance training, Functional mobility training, Transfer training, Patient/Caregiver education & training, Safety education & training, Home exercise program, Equipment evaluation, education, & procurement, Therapeutic activities, Endurance training, Gait training, Stair training  Safety Devices  Type of Devices: Gait belt, Left in bed, Call light within reach, Bed alarm in place, Patient at risk for falls, Nurse notified  Restraints  Restraints Initially in Place: No  Restraints: 4 bed rails d/t seizure precautions     Restrictions  Restrictions/Precautions  Restrictions/Precautions: General Precautions, Up as Tolerated, Fall Risk  Required Braces or Orthoses?: No  Position Activity Restriction  Other position/activity restrictions: LTME, extubated 10/4     Subjective   General  Patient assessed for rehabilitation services?: Yes  Response To Previous Treatment: Patient with no complaints from previous session. Family / Caregiver Present: Yes (Dtr and Ex .)  Follows Commands: Within Functional Limits  Subjective  Subjective: Pt supine in bed and agreeable to therapy, Pt denies pain at rest.requested assit to use rest room rior to ambulation. Cognition   Orientation  Overall Orientation Status: Within Functional Limits  Orientation Level: Oriented to time;Oriented to place;Oriented to situation;Oriented to person  Cognition  Overall Cognitive Status: Exceptions  Arousal/Alertness: Appropriate responses to stimuli  Following Commands: Follows one step commands with increased time; Follows one step commands with repetition; Inconsistently follows commands  Insights: Decreased awareness of deficits  Initiation: Requires cues for all  Sequencing: Requires cues for some     Objective         Bed Mobility Training  Bed Mobility Training: Yes  Overall Level of Assistance: Stand-by assistance  Rolling: Stand-by assistance  Supine to Sit: Stand-by assistance  Sit to Supine: Stand-by assistance  Scooting: Stand-by assistance  Balance  Sitting: Without support (seated on EOB and on BSC unsupported SBA)  Standing: Without support (stood for approx 2-3 min x2 for toileting SBA w/no LOB)  Transfer Training  Transfer Training: Yes  Overall Level of Assistance: Stand-by assistance  Sit to Stand: Stand-by assistance  Stand to Sit: Stand-by assistance  Bed to Chair: Stand-by assistance  Toilet Transfer: Stand-by assistance  Gait  Overall Level of Assistance: Stand-by assistance  Interventions: Demonstration;Verbal cues; Visual cues; Tactile cues; Safety awareness training  Bed mobility  Supine to Sit: Contact guard assistance  Sit to Supine: Contact guard assistance  Scooting: Stand by assistance  Transfers  Sit to Stand: Contact guard assistance  Stand to Sit: Contact guard assistance  Ambulation  Surface: Level tile  Device: Rolling Walker  Assistance: Contact guard assistance  Quality of Gait: unsteady, decreased stride length,Cyeing for pt to watch out for obstavle throughout, easily distracred  making her a fall risk  Gait Deviations: Slow Cynthia;Decreased step length  Distance: 80ft x2  Comments: demo decrease safety awareness t/o unsafe to amb without asssist .  More Ambulation?: No  Stairs/Curb  Stairs?: No     Balance  Sitting - Static: Fair;+  Sitting - Dynamic: Fair  Standing - Static: Fair;-  Standing - Dynamic: Fair;-  Exercise Treatment: . Seated LE exercise program: Long Arc Quads, hip abduction/adduction, heel/toe raises, and marches.  Reps: 20      AM-PAC Score  AM-PAC Inpatient Mobility Raw Score : 18 (10/08/22 1217)  AM-PAC Inpatient T-Scale Score : 43.63 (10/08/22 1217)  Mobility Inpatient CMS 0-100% Score: 46.58 (10/08/22 1217)  Mobility Inpatient CMS G-Code Modifier : CK (10/08/22 1217)   Goals  Short Term Goals  Time Frame for Short Term Goals: 14 visits  Short Term Goal 1: Pt will perform sit<>stand transfer with SBA. Short Term Goal 2: Pt will ambulate 125 feet with least restrictive device and SBA to increase functional independence. Short Term Goal 3: Pt will demonstrate fair+ standing balance to decrease fall risk. Short Term Goal 4: Pt will tolerate a 35 minute therapy session to promote increased endurance. Education  Patient Education  Education Given To: Patient  Education Provided: Role of Therapy;Transfer Training;Equipment;Plan of Care; Fall Prevention Strategies  Education Method: Verbal  Barriers to Learning: Cognition  Education Outcome: Continued education needed      Therapy Time   Individual Concurrent Group Co-treatment   Time In 1591         Time Out 1110         Minutes 29         Timed Code Treatment Minutes: [address] , [facility]

## 2022-10-11 NOTE — PROGRESS NOTES
Occupational Therapy  Facility/Department: [address] STEPDOWN  Occupational Therapy Daily Treatment Note      Name: Lelo Mccarthy  : 1965  MRN: [id number]  Date of Service: 10/11/2022    Discharge Recommendations:  Patient would benefit from continued therapy after discharge       Patient Diagnosis(es): The primary encounter diagnosis was Septicemia [facility]). Diagnoses of Altered mental status, unspecified altered mental status type, Metabolic encephalopathy, Seizure ([facility] Seizure disorder ([facility] Acute respiratory failure with hypoxia ([address] and Breakthrough seizure ([facility] were also pertinent to this visit. Past Medical History:  has a past medical history of Anemia, Bleeding, Cervical cancer ([facility] Depression, Diabetes mellitus ([address] GERD (gastroesophageal reflux disease), Hx of blood clots, Hypertension, Metastatic cancer ([facility] Ovarian cancer ([facility] Post chemo evaluation, PRES (posterior reversible encephalopathy syndrome), and Splenic lesion. Past Surgical History:  has a past surgical history that includes Hysterectomy, total abdominal; Port Surgery; Tonsillectomy; IR PORT PLACEMENT > 5 YEARS (2020); Anus surgery; Abscess Drainage (); colectomy (2013); IR EMBOLIZATION HEMORRHAGE (10/05/2020); and Cardiac catheterization. Treatment Diagnosis: Septicemia      Assessment   Performance deficits / Impairments: Decreased functional mobility ; Decreased ADL status; Decreased high-level IADLs;Decreased safe awareness;Decreased balance;Decreased cognition;Decreased coordination  Assessment: Pt would benefit from continued acute care and post acute care OT to address above listed deficits.   Treatment Diagnosis: Septicemia  Prognosis: Fair  Activity Tolerance  Activity Tolerance: Patient Tolerated treatment well        Plan   Occupational Therapy Plan  Times Per Week: 2-3x/wk  Current Treatment Recommendations: Balance training, Functional mobility training, Endurance training, Safety education & training, Pain management, Cognitive reorientation, Patient/Caregiver education & training, Self-Care / ADL, Equipment evaluation, education, & procurement, Cognitive/Perceptual training     Restrictions  Restrictions/Precautions  Restrictions/Precautions: General Precautions, Up as Tolerated, Fall Risk  Required Braces or Orthoses?: No  Position Activity Restriction  Other position/activity restrictions: LTME, extubated 10/4    Subjective   General  Patient assessed for rehabilitation services?: Yes  Family / Caregiver Present: No  General Comment  Comments: RN ok'd for OT tx this PM. Pt agreeable to session, pleasent/cooperative throughout. Pt denies any pain. Objective   SpO2: 95 %  O2 Device: None (Room air)  Safety Devices  Type of Devices: Gait belt;Left in bed;Call light within reach; Bed alarm in place; Patient at risk for falls;Nurse notified;Sitter present  Bed Mobility Training  Bed Mobility Training: Yes  Overall Level of Assistance: Stand-by assistance  Rolling: Stand-by assistance  Supine to Sit: Stand-by assistance  Sit to Supine: Stand-by assistance  Scooting: Stand-by assistance  Balance  Sitting: Without support (seated on EOB and on BSC unsupported SBA)  Standing: Without support (stood for approx 2-3 min x2 for toileting SBA w/no LOB)  Transfer Training  Transfer Training: Yes  Overall Level of Assistance: Stand-by assistance  Sit to Stand: Stand-by assistance  Stand to Sit: Stand-by assistance  Bed to Chair: Stand-by assistance  Toilet Transfer: Stand-by assistance  Gait  Overall Level of Assistance: Stand-by assistance  Interventions: Demonstration;Verbal cues; Visual cues; Tactile cues; Safety awareness training        ADL  Feeding: Stand by assistance;Setup;Verbal cueing; Increased time to complete (able to open containers and feed self)  Grooming: Stand by assistance;Setup;Verbal cueing; Increased time to complete (wash face, brush teeth, pt declined brushing hair \"too tangled to brush\")  UE Bathing: Verbal cueing;Setup; Increased time to complete;Stand by assistance (assist to wash back)  LE Bathing: Verbal cueing;Setup; Increased time to complete;Stand by assistance (seated to wash BLE using fig 4 tech)  UE Dressing: Verbal cueing;Setup; Increased time to complete;Stand by assistance (assist to snap, thread arms and tie gown)  LE Dressing: Verbal cueing;Setup; Increased time to complete;Stand by assistance (seated to don footies using fig 4 tech)  Toileting: Increased time to complete;Setup;Stand by assistance (able to complete alessandro care seated on BSC)     Pt in bed upon arrival. Transferred to EOB>BSC>recliner w/SBA. Pt setup at tray table for self care (see above for LOF). Pt wanted to retire back to bed d/t not sleeping at all last night. Pt retired to supine in bed. Call light and phone in reach. RN notified. Pt on RA, no SOB with increased activity. Pt needs increased time and assist d/t fatigue and overall weakness. Cognition  Overall Cognitive Status: Exceptions  Arousal/Alertness: Appropriate responses to stimuli  Following Commands: Follows one step commands with increased time; Follows one step commands with repetition; Inconsistently follows commands  Attention Span: Attends with cues to redirect  Safety Judgement: Decreased awareness of need for assistance;Decreased awareness of need for safety  Problem Solving: Decreased awareness of errors;Assistance required to identify errors made;Assistance required to generate solutions  Insights: Not aware of deficits  Initiation: Requires cues for all  Sequencing: Requires cues for some  Orientation  Overall Orientation Status: Impaired  Orientation Level: Oriented to time;Oriented to place;Oriented to situation;Oriented to person  Education Given To: Patient  Education Provided: Role of Therapy;Plan of Care;Transfer Training; Fall Prevention Strategies  Education Method: Verbal  Barriers to Learning: None  Education Outcome: Verbalized understanding;Demonstrated understanding    AM-PAC Score  AM-PAC Inpatient Daily Activity Raw Score: 20 (10/11/22 1448)  AM-PAC Inpatient ADL T-Scale Score : 42.03 (10/11/22 1448)  ADL Inpatient CMS 0-100% Score: 38.32 (10/11/22 1448)  ADL Inpatient CMS G-Code Modifier : CJ (10/11/22 1448)     Goals  Short Term Goals  Time Frame for Short Term Goals: By discharge pt will:  Short Term Goal 1: Demo bed mobility sit<>supine with SBA and 1 VC  Short Term Goal 2: Demo functional sit<>stand transfers and functional mobility with SBA, LRD PRN and 0 VCs  Short Term Goal 3: Demo UB ADLs with SBA, setup and no more than 2 VCs each task  Short Term Goal 4: Demo LB ADLs with CBA, setup and no more than 2 VCs each task  Short Term Goal 5: Maintain attention to functional/meaningful taskf or +3 minutes with no more than 2 VCs for redirection to address cognition     Therapy Time   Individual Concurrent Group Co-treatment   Time In 1325         Time Out 1425         Minutes 60         Timed Code Treatment Minutes: [address], NEFF/L

## 2022-10-12 LAB
ABSOLUTE EOS #: 0.41 K/UL (ref 0–0.44)
ABSOLUTE IMMATURE GRANULOCYTE: 0.05 K/UL (ref 0–0.3)
ABSOLUTE LYMPH #: 1.04 K/UL (ref 1.1–3.7)
ABSOLUTE MONO #: 0.92 K/UL (ref 0.1–1.2)
ANION GAP SERPL CALCULATED.3IONS-SCNC: 12 MMOL/L (ref 9–17)
BASOPHILS # BLD: 0 % (ref 0–2)
BASOPHILS ABSOLUTE: 0.03 K/UL (ref 0–0.2)
BUN BLDV-MCNC: 18 MG/DL (ref 6–20)
CALCIUM SERPL-MCNC: 8.9 MG/DL (ref 8.6–10.4)
CHLORIDE BLD-SCNC: 103 MMOL/L (ref 98–107)
CO2: 26 MMOL/L (ref 20–31)
CREAT SERPL-MCNC: 1.1 MG/DL (ref 0.5–0.9)
EOSINOPHILS RELATIVE PERCENT: 4 % (ref 1–4)
GFR SERPL CREATININE-BSD FRML MDRD: 58 ML/MIN/1.73M2
GLUCOSE BLD-MCNC: 108 MG/DL (ref 70–99)
GLUCOSE BLD-MCNC: 119 MG/DL (ref 65–105)
GLUCOSE BLD-MCNC: 139 MG/DL (ref 65–105)
GLUCOSE BLD-MCNC: 154 MG/DL (ref 65–105)
HCT VFR BLD CALC: 37.8 % (ref 36.3–47.1)
HEMOGLOBIN: 12 G/DL (ref 11.9–15.1)
IMMATURE GRANULOCYTES: 1 %
LYMPHOCYTES # BLD: 11 % (ref 24–43)
MAGNESIUM: 2.1 MG/DL (ref 1.6–2.6)
MCH RBC QN AUTO: 29.6 PG (ref 25.2–33.5)
MCHC RBC AUTO-ENTMCNC: 31.7 G/DL (ref 28.4–34.8)
MCV RBC AUTO: 93.1 FL (ref 82.6–102.9)
MONOCYTES # BLD: 10 % (ref 3–12)
NRBC AUTOMATED: 0 PER 100 WBC
PDW BLD-RTO: 17.2 % (ref 11.8–14.4)
PLATELET # BLD: 341 K/UL (ref 138–453)
PMV BLD AUTO: 9.8 FL (ref 8.1–13.5)
POTASSIUM SERPL-SCNC: 3.3 MMOL/L (ref 3.7–5.3)
RBC # BLD: 4.06 M/UL (ref 3.95–5.11)
RBC # BLD: ABNORMAL 10*6/UL
SEG NEUTROPHILS: 74 % (ref 36–65)
SEGMENTED NEUTROPHILS ABSOLUTE COUNT: 7.06 K/UL (ref 1.5–8.1)
SODIUM BLD-SCNC: 141 MMOL/L (ref 135–144)
WBC # BLD: 9.5 K/UL (ref 3.5–11.3)

## 2022-10-12 PROCEDURE — 99232 SBSQ HOSP IP/OBS MODERATE 35: CPT | Performed by: INTERNAL MEDICINE

## 2022-10-12 PROCEDURE — 94761 N-INVAS EAR/PLS OXIMETRY MLT: CPT

## 2022-10-12 PROCEDURE — 36415 COLL VENOUS BLD VENIPUNCTURE: CPT

## 2022-10-12 PROCEDURE — 6370000000 HC RX 637 (ALT 250 FOR IP)

## 2022-10-12 PROCEDURE — 6370000000 HC RX 637 (ALT 250 FOR IP): Performed by: STUDENT IN AN ORGANIZED HEALTH CARE EDUCATION/TRAINING PROGRAM

## 2022-10-12 PROCEDURE — 6360000002 HC RX W HCPCS: Performed by: STUDENT IN AN ORGANIZED HEALTH CARE EDUCATION/TRAINING PROGRAM

## 2022-10-12 PROCEDURE — 85025 COMPLETE CBC W/AUTO DIFF WBC: CPT

## 2022-10-12 PROCEDURE — 6370000000 HC RX 637 (ALT 250 FOR IP): Performed by: NURSE PRACTITIONER

## 2022-10-12 PROCEDURE — 2060000000 HC ICU INTERMEDIATE R&B

## 2022-10-12 PROCEDURE — 97535 SELF CARE MNGMENT TRAINING: CPT

## 2022-10-12 PROCEDURE — 80048 BASIC METABOLIC PNL TOTAL CA: CPT

## 2022-10-12 PROCEDURE — 83735 ASSAY OF MAGNESIUM: CPT

## 2022-10-12 PROCEDURE — 82947 ASSAY GLUCOSE BLOOD QUANT: CPT

## 2022-10-12 PROCEDURE — 2580000003 HC RX 258: Performed by: STUDENT IN AN ORGANIZED HEALTH CARE EDUCATION/TRAINING PROGRAM

## 2022-10-12 RX ORDER — POTASSIUM CHLORIDE 20 MEQ/1
40 TABLET, EXTENDED RELEASE ORAL ONCE
Status: COMPLETED | OUTPATIENT
Start: 2022-10-12 | End: 2022-10-12

## 2022-10-12 RX ADMIN — ANTI-FUNGAL POWDER MICONAZOLE NITRATE TALC FREE: 1.42 POWDER TOPICAL at 14:34

## 2022-10-12 RX ADMIN — APIXABAN 5 MG: 5 TABLET, FILM COATED ORAL at 21:08

## 2022-10-12 RX ADMIN — LORAZEPAM 1 MG: 1 TABLET ORAL at 14:33

## 2022-10-12 RX ADMIN — LACOSAMIDE 100 MG: 100 TABLET, FILM COATED ORAL at 08:26

## 2022-10-12 RX ADMIN — OXYCODONE HYDROCHLORIDE AND ACETAMINOPHEN 1 TABLET: 5; 325 TABLET ORAL at 13:21

## 2022-10-12 RX ADMIN — POTASSIUM CHLORIDE 40 MEQ: 20 TABLET, EXTENDED RELEASE ORAL at 14:33

## 2022-10-12 RX ADMIN — APIXABAN 5 MG: 5 TABLET, FILM COATED ORAL at 08:24

## 2022-10-12 RX ADMIN — OXYCODONE HYDROCHLORIDE AND ACETAMINOPHEN 1 TABLET: 5; 325 TABLET ORAL at 03:20

## 2022-10-12 RX ADMIN — LORAZEPAM 1 MG: 1 TABLET ORAL at 21:08

## 2022-10-12 RX ADMIN — QUETIAPINE FUMARATE 25 MG: 25 TABLET ORAL at 21:08

## 2022-10-12 RX ADMIN — SODIUM CHLORIDE, PRESERVATIVE FREE 10 ML: 5 INJECTION INTRAVENOUS at 21:09

## 2022-10-12 RX ADMIN — ONDANSETRON 4 MG: 2 INJECTION INTRAMUSCULAR; INTRAVENOUS at 13:21

## 2022-10-12 RX ADMIN — OXYCODONE HYDROCHLORIDE AND ACETAMINOPHEN 1 TABLET: 5; 325 TABLET ORAL at 19:27

## 2022-10-12 RX ADMIN — LAMOTRIGINE 125 MG: 100 TABLET ORAL at 21:08

## 2022-10-12 RX ADMIN — Medication 5 MG: at 21:08

## 2022-10-12 RX ADMIN — AMLODIPINE BESYLATE 10 MG: 10 TABLET ORAL at 08:24

## 2022-10-12 RX ADMIN — OXYCODONE HYDROCHLORIDE AND ACETAMINOPHEN 1 TABLET: 5; 325 TABLET ORAL at 23:27

## 2022-10-12 RX ADMIN — METOPROLOL TARTRATE 25 MG: 25 TABLET ORAL at 21:08

## 2022-10-12 RX ADMIN — ANTI-FUNGAL POWDER MICONAZOLE NITRATE TALC FREE: 1.42 POWDER TOPICAL at 21:10

## 2022-10-12 RX ADMIN — SERTRALINE 100 MG: 50 TABLET, FILM COATED ORAL at 08:24

## 2022-10-12 RX ADMIN — LEVETIRACETAM 1000 MG: 500 TABLET, FILM COATED ORAL at 08:24

## 2022-10-12 RX ADMIN — LAMOTRIGINE 125 MG: 100 TABLET ORAL at 08:25

## 2022-10-12 RX ADMIN — LACOSAMIDE 100 MG: 100 TABLET, FILM COATED ORAL at 21:08

## 2022-10-12 RX ADMIN — QUETIAPINE FUMARATE 25 MG: 25 TABLET ORAL at 08:24

## 2022-10-12 RX ADMIN — LEVETIRACETAM 1000 MG: 500 TABLET, FILM COATED ORAL at 21:08

## 2022-10-12 RX ADMIN — FAMOTIDINE 20 MG: 20 TABLET, FILM COATED ORAL at 08:24

## 2022-10-12 RX ADMIN — METOPROLOL TARTRATE 25 MG: 25 TABLET ORAL at 08:24

## 2022-10-12 ASSESSMENT — PAIN SCALES - GENERAL
PAINLEVEL_OUTOF10: 8
PAINLEVEL_OUTOF10: 7
PAINLEVEL_OUTOF10: 8

## 2022-10-12 NOTE — PROGRESS NOTES
CLINICAL PHARMACY NOTE: MEDS TO BEDS    Total # of Prescriptions Filled: 6   The following medications were delivered to the patient:  Amlodipine  Metoprolol  Lacosamide  Potassium  Quetiapine  Antifungal powder    Additional Documentation:

## 2022-10-12 NOTE — PROGRESS NOTES
Occupational Therapy  Facility/Department: [address] STEPDOWN  Occupational Therapy Daily Treatment Note    Name: Jimbo Martinez  : 1965  MRN: [id number]  Date of Service: 10/12/2022    Discharge Recommendations:  Patient would benefit from continued therapy after discharge     Patient Diagnosis(es): The primary encounter diagnosis was Septicemia [facility]). Diagnoses of Altered mental status, unspecified altered mental status type, Metabolic encephalopathy, Seizure ([facility] Seizure disorder ([facility] Acute respiratory failure with hypoxia ([facility] and Breakthrough seizure ([facility] were also pertinent to this visit. Past Medical History:  has a past medical history of Anemia, Bleeding, Cervical cancer ([facility] Depression, Diabetes mellitus ([facility] GERD (gastroesophageal reflux disease), Hx of blood clots, Hypertension, Metastatic cancer ([facility] Ovarian cancer ([facility] Post chemo evaluation, PRES (posterior reversible encephalopathy syndrome), and Splenic lesion. Past Surgical History:  has a past surgical history that includes Hysterectomy, total abdominal; Port Surgery; Tonsillectomy; IR PORT PLACEMENT > 5 YEARS (2020); Anus surgery; Abscess Drainage (); colectomy (2013); IR EMBOLIZATION HEMORRHAGE (10/05/2020); and Cardiac catheterization. Treatment Diagnosis: Septicemia    Assessment   Performance deficits / Impairments: Decreased functional mobility ; Decreased ADL status; Decreased high-level IADLs;Decreased safe awareness;Decreased balance;Decreased cognition;Decreased coordination  Assessment: Pt would benefit from continued acute care and post acute care OT to address above listed deficits.   Treatment Diagnosis: Septicemia  Prognosis: Fair  REQUIRES OT FOLLOW-UP: Yes  Activity Tolerance  Activity Tolerance: Patient Tolerated treatment well        Plan   Occupational Therapy Plan  Times Per Week: 2-3x/wk  Current Treatment Recommendations: Balance training, Functional mobility training, Endurance training, Safety education & training, Pain management, Cognitive reorientation, Patient/Caregiver education & training, Self-Care / ADL, Equipment evaluation, education, & procurement, Cognitive/Perceptual training     Restrictions  Restrictions/Precautions  Restrictions/Precautions: General Precautions, Up as Tolerated, Fall Risk  Required Braces or Orthoses?: No  Position Activity Restriction  Other position/activity restrictions: extubated 10/4    Subjective   General  Patient assessed for rehabilitation services?: Yes  Family / Caregiver Present: No  Subjective  Subjective: Pt asked if there was anything to try and get the glue out of her hair from previous procedure. General Comment  Comments: RN ok'd for OT treatment PM. Pt agreeable to session, pleasent/cooperative throughout. Pt denies any pain. Objective   O2 Device: None (Room air)  Safety Devices  Type of Devices: Left in bed;Call light within reach; Bed alarm in place; Patient at risk for falls;Nurse notified  Restraints  Restraints Initially in Place: No  Bed Mobility Training  Bed Mobility Training: Yes  Overall Level of Assistance: Stand-by assistance  Rolling: Stand-by assistance  Supine to Sit: Stand-by assistance  Sit to Supine: Stand-by assistance  Scooting: Stand-by assistance  Balance  Sitting: Without support (seated on EOB and on BSC unsupported SBA, ~10 mins)  Transfer Training  Transfer Training: No    ADL  Grooming: Stand by assistance;Setup;Verbal cueing; Increased time to complete  Grooming Skilled Clinical Factors: Minimal VCs for appropriate sequencing of oral hygiene. Pt confused with getting toothbrush wet before use, vc and tactile cue required to complete step in task. Vc required for initiating task. UB Bathing: Verbal cueing; Setup; Increased time to complete; Stand by assistance (washed faced/neck, 1 vc d/t minimal lack of awareness)     UE Dressing: Verbal cueing;Setup; Increased time to complete;Stand by assistance (assist to snap, thread arms and tie gown)  Additional Comments: Minimally limited by cognition, pt completed all ADLs at EOB, ~10 mins. Cognition  Overall Cognitive Status: Exceptions  Arousal/Alertness: Appropriate responses to stimuli  Following Commands: Follows one step commands with increased time; Follows one step commands with repetition  Attention Span: Attends with cues to redirect  Safety Judgement: Decreased awareness of need for assistance;Decreased awareness of need for safety  Problem Solving: Decreased awareness of errors;Assistance required to identify errors made;Assistance required to generate solutions  Insights: Decreased awareness of deficits  Initiation: Requires cues for some  Sequencing: Requires cues for some  Orientation  Overall Orientation Status: Within Normal Limits  Orientation Level: Oriented to time;Oriented to place;Oriented to situation;Oriented to person    Education Given To: Patient  Education Provided: Role of Therapy;Plan of Care;Transfer Training; Fall Prevention Strategies  Education Provided Comments: Pt educated on bed mobility and safe hand placement w/ sitting at EOB, good return from pt.   Education Method: Verbal  Barriers to Learning: None  Education Outcome: Verbalized understanding;Demonstrated understanding    AM-PAC Score  AM-[facility] Inpatient Daily Activity Raw Score: 20 (10/12/22 1633)  AM-PAC Inpatient ADL T-Scale Score : 42.03 (10/12/22 1633)  ADL Inpatient CMS 0-100% Score: 38.32 (10/12/22 1633)  ADL Inpatient CMS G-Code Modifier : CJ (10/12/22 1633)    Goals  Short Term Goals  Time Frame for Short Term Goals: By discharge pt will:  Short Term Goal 1: Demo bed mobility sit<>supine with SBA and 1 VC  Short Term Goal 2: Demo functional sit<>stand transfers and functional mobility with SBA, LRD PRN and 0 VCs  Short Term Goal 3: Demo UB ADLs with SBA, setup and no more than 2 VCs each task  Short Term Goal 4: Demo LB ADLs with CBA, setup and no more than 2 VCs each task  Short Term Goal 5: Maintain attention to functional/meaningful taskf or +3 minutes with no more than 2 VCs for redirection to address cognition       Therapy Time   Individual Concurrent Group Co-treatment   Time In 1528         Time Out 1600         Minutes 32         Timed Code Treatment Minutes: 93 AISHWARYA Mcneil/JOAQUÍN

## 2022-10-12 NOTE — DISCHARGE INSTR - COC
Continuity of Care Form    Patient Name: Leigha Teresa   :  1965  MRN:  [id number]    Admit date:  10/3/2022  Discharge date:  10/17/22    Code Status Order: Full Code   Advance Directives:     Admitting Physician:  No admitting provider for patient encounter. PCP: Shamika Brand DO    Discharging Nurse: [id number]/Room#: 3806/7765-19  Discharging Unit Phone Number: [phone]    Emergency Contact:   Extended Emergency Contact Information  Primary Emergency Contact: Nalini Luna  Address: Harsha Pizarro, [address] Phone: [phone]  Mobile Phone: [phone]  Relation: Child  Secondary Emergency Contact: Tom Vallejo hipaa  Address: *DO NOT [facility] [address] Phone: [phone]  Work Phone: [phone]  Mobile Phone: [phone]  Relation: Child    Past Surgical History:  Past Surgical History:   Procedure Laterality Date    ABSCESS DRAINAGE      Franca rectal    ANUS SURGERY      ANAL FISSURECTOMY    CARDIAC CATHETERIZATION      COLECTOMY  2013    ex lap, tumor debulking, transverse colectomy w reanastamosis, subgastric omentectomy, intraperitoneal port placement    HYSTERECTOMY, TOTAL ABDOMINAL (CERVIX REMOVED)      IR EMBOLIZATION HEMORRHAGE  10/05/2020    intra-abdominal bleeding -due to splenic mass with GI infiltration. Status post embolization [employer] interlock coils x7. mri condtional 3t ok, safe immediately post implant.     IR PORT PLACEMENT EQUAL OR GREATER THAN 5 YEARS  2020    IR PORT PLACEMENT EQUAL OR GREATER THAN 5 YEARS 2020 Ruperto Stein MD [facility]    PORT SURGERY      IP Port    TONSILLECTOMY         Immunization History:   Immunization History   Administered Date(s) Administered    Hepatitis B 2008    Influenza A (D4D6-07) Vaccine PF IM 2009    Influenza, FLUARIX, FLULAVAL, FLUZONE (age 10 mo+) AND AFLURIA, (age 1 y+), PF, 0.5mL 10/21/2020    Influenza, FLUCELVAX, (age 10 mo+), MDCK, PF, 0.5mL 11/07/2018, 11/21/2019    Pneumococcal Conjugate 13-valent ([id number]) 10/21/2020    Pneumococcal Polysaccharide ([id number]) 12/01/2010, 12/11/2020       Active Problems:  Patient Active Problem List   Diagnosis Code    Malignant neoplasm of ovary ([facility] [address] C56.9    ACP (advance care planning) Z71.89    Seizure ([facility] [address] R56.9    Anemia D64.9    Splenic abscess D73.3    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) C79.60    Acute encephalopathy G93.40    PRES (posterior reversible encephalopathy syndrome) I67.83    Hemianopia, bitemporal H53.47    Encephalopathy acute G93.40    Seizure disorder, status epilepticus, nonconvulsive (HCC) G40.901    History of ovarian cancer Z85.43    Pleural effusion J90    Bandemia D72.825    Cancer, metastatic to bone ([facility]) C79.51    Intractable low back pain M54.59    Fatigue R53.83    Chronic deep vein thrombosis (DVT) of femoral vein of left lower extremity ([facility]) I82.512    Iron deficiency anemia secondary to inadequate dietary iron intake D50.8    Iron malabsorption K90.9    Gynecologic cancer ([facility]) C57.9    Thrombocytosis D75.839    History of deep venous thrombosis (DVT) of distal vein of left lower extremity: On Eliquis Z86.718    Pelvic mass R19.00    Acute on chronic anemia D64.9    AMS (altered mental status) R41.82    Lactic acidosis E87.20    Anxiety F41.9    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 ([facility]) I82.512    Diabetes mellitus ([facility]) E11.9    Gastroesophageal reflux disease K21.9    Recurrent major depressive disorder, in partial remission ([facility]) F33.41    Ileus ([facility]) K56.7    Pericardial effusion I31.39    Acute respiratory failure with hypoxia ([facility]) S24.30    Metabolic encephalopathy I99.97    Confusion R41.0    Urinary tract infection without hematuria N39.0    Seizure disorder ([facility]) G40.909    Breakthrough seizure ([facility]) G40.919    Leg swelling M79.89    Iron deficiency E61.1    Septicemia ([facility]) A41.9 Isolation/Infection:   Isolation            No Isolation          Patient Infection Status       Infection Onset Added Last Indicated Last Indicated By Review Planned Expiration Resolved Resolved By    None active    Resolved    C-diff Rule Out 10/11/22 10/11/22 10/11/22 Gastrointestinal Panel, Molecular (Ordered)   10/12/22 Lisha Walsh RN    COVID-19 (Rule Out) 08/20/20 08/20/20 08/21/20 Covid-19 Ambulatory (Ordered)   08/22/20 Rule-Out Test Resulted            Nurse Assessment:  Last Vital Signs: /71   Pulse 96   Temp 98.1 °F (36.7 °C) (Oral)   Resp 24   Ht 5' 5\" (1.651 m)   Wt 151 lb 3.8 oz (68.6 kg)   SpO2 97%   BMI 25.17 kg/m²     Last documented pain score (0-10 scale): Pain Level: 8  Last Weight:   Wt Readings from Last 1 Encounters:   10/07/22 151 lb 3.8 oz (68.6 kg)     Mental Status:  oriented and alert auditory hallucinations that started after seizures. IV Access:  - None    Nursing Mobility/ADLs:  Walking   Assisted  Transfer  Independent  Bathing  Assisted  Dressing  Independent  Toileting  Assisted  Feeding  Independent  Med Admin  Assisted  Med Delivery   whole    Wound Care Documentation and Therapy:  Puncture 01/12/21 Wrist Anterior;Right (Active)   Number of days: 637        Elimination:  Continence: Bowel: Yes  Bladder: Yes  Urinary Catheter: None   Colostomy/Ileostomy/Ileal Conduit: No       Date of Last BM: 10/15/22    Intake/Output Summary (Last 24 hours) at 10/12/2022 1045  Last data filed at 10/11/2022 1730  Gross per 24 hour   Intake 500 ml   Output 50 ml   Net 450 ml     I/O last 3 completed shifts:   In: 650 [P.O.:650]  Out: 150 [Urine:150]    Safety Concerns:     History of Falls (last 30 days) and At Risk for Falls    Impairments/Disabilities:      Weakness BLE    Nutrition Therapy:  Current Nutrition Therapy:   - Oral Diet:  General    Routes of Feeding: Oral  Liquids: No Restrictions  Daily Fluid Restriction: no  Last Modified Barium Swallow with Video (Video Swallowing Test): not done    Treatments at the Time of Hospital Discharge:   Respiratory Treatments: See [facility]  Oxygen Therapy:  is not on home oxygen therapy. Ventilator:    - No ventilator support    Rehab Therapies: Physical Therapy and Occupational Therapy  Weight Bearing Status/Restrictions: No weight bearing restrictions  Other Medical Equipment (for information only, NOT a DME order):  walker and bath bench  Other Treatments: Blisters to buttocks open to air. Patient's personal belongings (please select all that are sent with patient):  Glasses    RN SIGNATURE:  Electronically signed by Symone Edward RN on 10/17/22 at 11:12 AM EDT    CASE MANAGEMENT/SOCIAL WORK SECTION    Inpatient Status Date: 10/3/22    Readmission Risk Assessment Score:  Readmission Risk              Risk of Unplanned Readmission:  37           Discharging to Facility/ Agency   [facility]    / signature: Electronically signed by Angela Rosa RN on 10/12/22 at 10:45 AM EDT    PHYSICIAN SECTION    Prognosis: Fair    Condition at Discharge: Stable    Rehab Potential (if transferring to Rehab): Fair    Recommended Labs or Other Treatments After Discharge:   Please start taking norvasc 10 mg qd and lopressor 25 mg bid for blood pressure  Please add vimpat 100 mg bid for AED  Please take seraquel 25 mg bid due to hallucinations. Please f/u with your oncologist out patient and PCP  Please get BMP done in one week to f/u on potassium levels   Please use miconazole at area of rash (antifungal)  PT and OT eval and treat  Monitor vitals  Physician Certification: I certify the above information and transfer of Mary Alice Santacruz  is necessary for the continuing treatment of the diagnosis listed and that she requires [address] for greater 30 days.      Update Admission H&P: No change in H&P    PHYSICIAN SIGNATURE:  Electronically signed by Caesar Dailey MD on 10/12/22 at 11:20 AM EDT

## 2022-10-12 NOTE — CARE COORDINATION
Patient refusing snf perfect served Dr Darlyn De Paz to update and requested home care order and signed mona. Referral to [facility].      12:41 called [facility] will review case     14:27 called [facility] cannot accept  Went to [facility], multiple new home care referrals made       14:42 nayely called declined     15:08 met with patient to update   Verified home address and ph #  Discussed discharge today, she said doesn't have a key to get in house her daughter does and she's at work and that she will reach out to her to coordinate transportation home     15:23 sherine declined  [facility]ed [facility] no answer no vm  Interim called declined     15:49 diamond declined

## 2022-10-12 NOTE — PROGRESS NOTES
PALLIATIVE CARE NURSING ASSESSMENT    Patient: Darylene Genet [facility]  Room: 9500/4812-36    Reason For Consult   Goals of care evaluation  Distress management  Guidance and support  Facilitate communications  Assistance in coordinating care    Code Status: Full Code    Summary:  Met with patient for support and encouragement. Plan is to discharge today. Offered Palliative Care outpatient. Pt taking information home. Wished patient well moving forward. Impression: Maycol Garces is a 59y.o. year old female  has a past medical history of Anemia, Bleeding, Cervical cancer ([address] Depression, Diabetes mellitus ([address] GERD (gastroesophageal reflux disease), Hx of blood clots, Hypertension, Metastatic cancer ([address] Ovarian cancer ([address] Post chemo evaluation, PRES (posterior reversible encephalopathy syndrome), and Splenic lesion. .  Currently hospitalized for the management of Septicemia. The Palliative Care Team is following to assist with patient and family support, goals of care. Vital Signs  Blood pressure 133/64, pulse 83, temperature 98.3 °F (36.8 °C), temperature source Oral, resp. rate 22, height 5' 5\" (1.651 m), weight 151 lb 3.8 oz (68.6 kg), SpO2 97 %.     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)    Acute encephalopathy    PRES (posterior reversible encephalopathy syndrome)    Hemianopia, bitemporal    Encephalopathy acute    Seizure disorder, status epilepticus, nonconvulsive (HCC)    History of ovarian cancer    Pleural effusion    Bandemia    Cancer, metastatic to bone (HCC)    Intractable low back pain    Fatigue    Chronic deep vein thrombosis (DVT) of femoral vein of left lower extremity (HCC)    Iron deficiency anemia secondary to inadequate dietary iron intake    Iron malabsorption    Gynecologic cancer (HCC)    Thrombocytosis History of deep venous thrombosis (DVT) of distal vein of left lower extremity: On Eliquis    Pelvic mass    Acute on chronic anemia    AMS (altered mental status)    Lactic acidosis    Anxiety    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)    Diabetes mellitus (HCC)    Gastroesophageal reflux disease    Recurrent major depressive disorder, in partial remission (HCC)    Ileus (HCC)    Pericardial effusion    Acute respiratory failure with hypoxia (HCC)    Metabolic encephalopathy    Confusion    Urinary tract infection without hematuria    Seizure disorder (HCC)    Breakthrough seizure (HCC)    Leg swelling    Iron deficiency    Septicemia ([facility] [address]       Palliative Interaction: Reviewed chart for updates. Updates received from bedside nurse. Pt has discharge order. I let nurse and  know that I offered patient palliative care at home yesterday. I also let them know that patient related to me she would not go to SNF for rehab but that she said she would consider an ARU. Pt was gave patient pamphlets to [facility] palliative care resources. I let her know who does in home care and who is in an outpatient clinic. If she decides she would like to follow with one I let her know I can do referral.  If she decides after discharge she can contact them directly.   Each pamphlet has a contact number or reach out to her oncologist.      Rea Paz of care  Education/support to staff  Education/support to patient  Communications with primary service  Providing support for coping/adaptation/distress of patient  Validating patient/family distress  Continued communication updates      Palliative Care Coordinator  Courtney ACOSTA, RN, ONN-CG    [facility] Office: [address] [facility] Office: [phone]  [facility] Office: [phone]    For Symptom Management Clinic scheduling please call [phone]

## 2022-10-12 NOTE — PLAN OF CARE
Problem: Respiratory - Adult  Goal: Achieves optimal ventilation and oxygenation  10/12/2022 0505 by Patsy Alvarado RN  Outcome: Progressing  10/12/2022 0505 by Patsy Alvarado RN  Outcome: Progressing  10/11/2022 1815 by Caryn Elizondo RN  Outcome: Progressing     Problem: Discharge Planning  Goal: Discharge to home or other facility with appropriate resources  10/12/2022 0505 by Patsy Alvarado RN  Outcome: Progressing  10/12/2022 0505 by Patsy Alvarado RN  Outcome: Progressing  10/11/2022 1815 by Caryn Elizondo RN  Outcome: Progressing     Problem: Safety - Adult  Goal: Free from fall injury  10/12/2022 0505 by Patsy Alvarado RN  Outcome: Progressing  10/12/2022 0505 by Patsy Alvarado RN  Outcome: Progressing  10/11/2022 1815 by Caryn Elizondo RN  Outcome: Progressing     Problem: ABCDS Injury Assessment  Goal: Absence of physical injury  10/12/2022 0505 by Patsy Alvarado RN  Outcome: Progressing  10/12/2022 0505 by Patsy Alvarado RN  Outcome: Progressing     Problem: Skin/Tissue Integrity  Goal: Absence of new skin breakdown  Description: 1. Monitor for areas of redness and/or skin breakdown  2. Assess vascular access sites hourly  3. Every 4-6 hours minimum:  Change oxygen saturation probe site  4. Every 4-6 hours:  If on nasal continuous positive airway pressure, respiratory therapy assess nares and determine need for appliance change or resting period.   10/12/2022 0505 by Patsy Alvarado RN  Outcome: Progressing  10/12/2022 0505 by Patsy Alvarado RN  Outcome: Progressing     Problem: Pain  Goal: Verbalizes/displays adequate comfort level or baseline comfort level  10/12/2022 0505 by Patsy Alvarado RN  Outcome: Progressing  10/12/2022 0505 by Patsy Alvarado RN  Outcome: Progressing  10/11/2022 1815 by Caryn Elizondo RN  Outcome: Progressing     Problem: Chronic Conditions and Co-morbidities  Goal: Patient's chronic conditions and co-morbidity symptoms are monitored and maintained or improved  10/12/2022 0505 by Dione Coyle RN  Outcome: Progressing  10/12/2022 0505 by Dione Coyle RN  Outcome: Progressing

## 2022-10-12 NOTE — PROGRESS NOTES
[place]  Internal Medicine Teaching Residency Program  Inpatient Daily Progress Note  ______________________________________________________________________________    Patient: Tori Cabrera [facility]  YOB: 1965   YQN:[id number]    Acct: [de-identified]     Room: [facility]  Admit date: 10/3/2022  Today's date: 10/12/22  Number of days in the hospital: 9    SUBJECTIVE   Admitting Diagnosis: Septicemia ([address]  CC: Altered mental status    Pt examined at bedside. Chart & results reviewed. Acute events overnight. Hemodynamically stable, afebrile and maintaining saturation on room air. Still complains of auditory hallucinations but much improved from before. Her sleep has improved and continues to be on Seroquel twice daily. Yesterday she had complaint of diarrhea and her Colace was held. Diarrhea is resolved now. Neurology recommends continuing current antiseizure medications as her seizures are well controlled with that, last seizure 10/06. We will see her outpatient for follow-up in 4 to 6 weeks time and have signed off. Palliative consult-patient wants to go to rehab unit and does not want to go to a nursing facility.  will work with patient adding discharge disposition. ROS:  Constitutional:  negative for chills, fevers, sweats  Respiratory:  negative for cough, dyspnea on exertion, hemoptysis, shortness of breath, wheezing  Cardiovascular:  negative for chest pain, chest pressure/discomfort, lower extremity edema, palpitations  Gastrointestinal:  negative for abdominal pain, constipation, diarrhea, nausea, vomiting  Neurological:  negative for dizziness, headache  BRIEF HISTORY     53-year-old female presented with chief complaints of altered mental status and ED. Was found drowsy by daughter on 10/2/2022. She was brought in by EMS with altered mental status opening eyes to painful stimuli and low GCS.     In the ER she was meeting SIRS criteria and decision was made to intubate the patient for airway protection. Before intubation Narcan was also tried as there was a suspicion for acute toxic metabolic encephalopathy due to prescription opioid use. She was transferred to ICU for further management. In the ICU initially after intubation the patient was combative and not intolerant of the ET tube so she was sedated with propofol. Also started on IV fluids for ongoing hypotension. She has known history of seizures and was on antiseizure medications at home. CT scan chest showed bilateral pulmonary infiltrates concerning for aspiration pneumonia and she was started on broad-spectrum antibiotics. Neurology was also consulted for seizure work-up. 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 She has hx of intra-abdominal bleeding secondary to splenic mass with GI infiltration s/p embolization of the spleen. 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 They recommend stent placement if patient goes into complete lower bowel obstruction. During the ICU stay, the patient was on LTME which showed 6 subclinical seizures over 24 hours with left hand flapping and left head deviation during for electroclinical seizures. The patient was started with home dose of Keppra with loading dose 2 g and 1 mg Ativan for seizures and maintenance dose of Keppra increased to 1 g twice daily.     OBJECTIVE     Vital Signs:  /79   Pulse 79   Temp 97.9 °F (36.6 °C) (Oral)   Resp 21   Ht 5' 5\" (1.651 m)   Wt 151 lb 3.8 oz (68.6 kg)   SpO2 93%   BMI 25.17 kg/m²     Temp (24hrs), Av.2 °F (36.8 °C), Min:97.9 °F (36.6 °C), Max:98.4 °F (36.9 °C)    In: 650   Out: 150 [Urine:150]    Physical Exam:  Constitutional: This is a well developed, well nourished, 25-29.9 - Overweight 59y.o. year old female who is alert, oriented, cooperative and in no apparent distress. Head:normocephalic and atraumatic. Respiratory: Breath sounds bilaterally were clear to auscultation. There were no wheezes, rhonchi or rales. There is no intercostal retraction or use of accessory muscles. Cardiovascular: Regular without murmur, clicks, gallops or rubs. Abdomen: Slightly rounded and soft without organomegaly. No rebound, rigidity or guarding was appreciated. Musculoskeletal:  No gross muscle weakness. Extremities:  No lower extremity edema, ulcerations, tenderness, varicosities or erythema. Muscle size, tone and strength are normal.  No involuntary movements are noted. Skin:  Warm and dry. Good color, turgor and pigmentation. No lesions or scars.   No cyanosis or clubbing  Neurological/Psychiatric: The patient's general behavior, level of consciousness, thought content and emotional status is normal.        Medications:  Scheduled Medications:    melatonin  10 mg Oral Once    famotidine  20 mg Oral Daily    amLODIPine  10 mg Oral Daily    metoprolol tartrate  25 mg Oral BID    miconazole   Topical BID    QUEtiapine  25 mg Oral BID    lacosamide  100 mg Oral BID    levETIRAcetam  1,000 mg Oral BID    docusate  100 mg Oral Daily    apixaban  5 mg Oral BID    lamoTRIgine  125 mg Oral BID    sertraline  100 mg Oral Daily    melatonin  5 mg Oral Nightly    sodium chloride flush  5-40 mL IntraVENous 2 times per day     Continuous Infusions:    sodium chloride Stopped (10/10/22 1232)     PRN Medicationssodium chloride flush, 10 mL, PRN  LORazepam, 1 mg, Q4H PRN  oxyCODONE-acetaminophen, 1 tablet, Q4H PRN  morphine, 2 mg, Q4H PRN  albuterol, 2.5 mg, Q6H PRN  racepinephrine HCl, 11.25 mg, Q3H PRN  sodium chloride nebulizer, 3 mL, Q4H PRN  sodium chloride flush, 5-40 mL, PRN  sodium chloride, , PRN  ondansetron, 4 mg, Q8H PRN   Or  ondansetron, 4 mg, Q6H PRN  polyethylene glycol, 17 g, Daily PRN  acetaminophen, 650 mg, Q6H PRN   Or  acetaminophen, 650 mg, Q6H PRN      Diagnostic Labs:  CBC:   Recent Labs     10/10/22  0351 10/11/22  0457   WBC 8.6 9.4   RBC 3.89* 3.64*   HGB 11.7* 11.3*   HCT 36.1* 33.3*   MCV 92.8 91.5   RDW 16.6* 17.7*    456*       BMP:   Recent Labs     10/10/22  0351 10/10/22  1438 10/11/22  0457     --  138   K 2.9* 4.1 3.7     --  103   CO2 24  --  25   BUN 10  --  13   CREATININE 1.10*  --  1.15*       BNP: No results for input(s): BNP in the last 72 hours. PT/INR: No results for input(s): PROTIME, INR in the last 72 hours. APTT: No results for input(s): APTT in the last 72 hours. CARDIAC ENZYMES: No results for input(s): CKMB, CKMBINDEX, TROPONINI in the last 72 hours. Invalid input(s): CKTOTAL;3  FASTING LIPID PANEL:  Lab Results   Component Value Date    CHOL 131 03/10/2021    HDL 33 (L) 03/10/2021    TRIG 147 03/10/2021     LIVER PROFILE: No results for input(s): AST, ALT, ALB, BILIDIR, BILITOT, ALKPHOS in the last 72 hours. MICROBIOLOGY:   Lab Results   Component Value Date/Time    CULTURE NO GROWTH 5 DAYS 10/03/2022 02:44 PM       Imaging:    XR CHEST PORTABLE    Result Date: 10/5/2022  Interval extubation. Interstitial opacities have increased in the interval, for which developing edema should be considered. MRI BRAIN W WO CONTRAST    Result Date: 10/9/2022  1. Overall, findings are similar compared to brain MRI done October 4, 2022 with redemonstration of patchy areas of FLAIR hyperintense signal in the left posterior frontal lobe and right greater than left occipital lobes which may represent small old infarcts. 2.  No acute stroke, intracranial hemorrhage or abnormal enhancement. 3.  Mild chronic small vessel ischemic disease. MRI BRAIN WO CONTRAST    Result Date: 10/4/2022  1.  New subtle focus of T2/FLAIR hyperintensity in the lateral left frontal cortex and subcortical white matter is nonspecific and may reflect sequela of prior ischemia or mild posterior reversal encephalopathy syndrome. Short interval follow-up is recommended. 2. No evidence of acute infarct or intracranial hemorrhage. 3. Stable small foci of right occipital encephalomalacia in keeping with sequela of prior insult. ASSESSMENT & PLAN     ASSESSMENT / PLAN:     Principal Problem:    Septicemia ([address]  Active Problems:    Malignant neoplasm of ovary (HCC)    Seizure ([address]    Acute encephalopathy    Seizure disorder, status epilepticus, nonconvulsive (HCC)    Cancer, metastatic to bone (HCC)    Chronic deep vein thrombosis (DVT) of femoral vein of left lower extremity (HCC)    Iron deficiency anemia secondary to inadequate dietary iron intake    AMS (altered mental status)    Gastroesophageal reflux disease    Metabolic encephalopathy    Breakthrough seizure ([address]  Resolved Problems:    Type 2 diabetes mellitus without complication, without long-term current use of insulin (HCC)     Acute metabolic encephalopathy  - Possibly secondary to opioid use versus possible seizure-like activity  - Neurology consulted, LTME discontinued, suggests underlying structural defect and increased risk for focal onset seizures. -Mentation improved  -Continue Seroquel 25 mg twice daily     Subclinical seizures  - Neurology consulted recommends Keppra 1 g twice daily, Vimpat 100 mg twice daily and Lamictal 125 mg twice daily.  - MRI brain shows no acute infarct or hemorrhage and stable small foci of right occipital encephalomalacia.  -Follow-up MRI brain is stable compared to prior image, no obvious evidence of PRES, no evidence of metastatic lesions to the brain.  - Seizures well-controlled last seizure 9/06  - Outpatient follow-up with neurology in 4 to 6 weeks. Aspiration pneumonia  - Possibly secondary to altered mentation and acute metabolic encephalopathy  -Completed course of levaquin  - Supplemental oxygen therapy as needed.   - Continue nebulizer treatments and incentive

## 2022-10-12 NOTE — PROGRESS NOTES
Today's Date: 10/12/2022  Patient Name: Leigha Teresa  Date of admission: 10/3/2022  1:07 PM  Patient's age: 59 y.o., 1965  Admission Dx: Septicemia ([facility] [address] [A41.9]  Altered mental status, unspecified altered mental status type [R41.82]  AMS (altered mental status) [R41.82]      Requesting Physician: No admitting provider for patient encounter. CHIEF COMPLAINT: Altered mental status. Sepsis. Recurrent metastatic ovarian cancer. History Obtained From:  patient, electronic medical record    Interval history:  Patient seen and examined bedside. Patient's blood pressure stable. Afebrile. Currently saturating well on room air. Oriented to person, place, and time. Patient complains of continued auditory hallucinations and is having visual hallucinations as well. Positive dizziness, diarrhea, and nausea. HISTORY OF PRESENT ILLNESS:      The patient is a 59 y.o.  female who is admitted to the hospital for further management of altered mental status. She had increasing shortness of breath. She was admitted for sepsis. She was intubated for airway protection. Patient was admitted on October 3. She was extubated yesterday. Were consulted today because of her history of recurrent ovarian cancer. Patient is mentally alert and oriented. No respiratory distress. No fever. She is not in pain. Brief oncologic history:  DIAGNOSIS:       Recurrent ovarian cancer. Original diagnosis 2005 with multiple recurrences. Diffuse metastasis. CURRENT THERAPY:         Doxil/ Avastin started 9/18/2020. Avastin was discontinued due to recent GI bleeding. Status post recent vascular embolization  S/p seizure disorder. Gemzar started 2/26/2021. Interrupted due to hospitalization and problem with compliance.    Evidence of disease progression on CT scan 2/22/22  Added Carboplatin to Gemzar March 2022        BRIEF CASE HISTORY:      Ms. Janett Lund is a very pleasant 59 y.o. female with history of multiple co morbidities as listed. The patient seen in consultation for ovarian cancer with multiple recurrences. 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 She had surgical debulking and she had systemic treatment with Taxol and carboplatin for 6 cycles. Patient was in remission for about 2 years. She had a relapse in 2007 and treated with topotecan with good results. Patient relapsed again in 2008 and was treated with Taxol carboplatin. After 3 cycles of systemic chemotherapy she had problems with carboplatin so she finished 3 more cycles with Taxol. She did well again for 2 to 3 years until she had a relapse in 2012 and she had intraperitoneal chemotherapy treatment with Taxol. Patient was last seen by her oncologist in 2014 at which time she had relatively stable disease with normal tumor marker and no significant abnormal images. Patient moved to [place] after that and she was lost for oncology follow-ups. Patient was recently evaluated again here in [place] because of abdominal discomfort. Re imaging confirmed metastatic relapse. Biopsy confirmed ovarian cancer recurrence. Scan showed extensive intraabdominal and splenic involvement and lung mets. She has no symptoms at the present time. Patient denies smoking or alcohol drinking.l       Past Medical History:   has a past medical history of Anemia, Bleeding, Cervical cancer ([address] Depression, Diabetes mellitus ([address] GERD (gastroesophageal reflux disease), Hx of blood clots, Hypertension, Metastatic cancer ([address] Ovarian cancer ([address] Post chemo evaluation, PRES (posterior reversible encephalopathy syndrome), and Splenic lesion. Past Surgical History:   has a past surgical history that includes Hysterectomy, total abdominal; Port Surgery; Tonsillectomy; IR PORT PLACEMENT > 5 YEARS (08/24/2020); Anus surgery;  Abscess Drainage (2013); colectomy (03/2013); IR EMBOLIZATION HEMORRHAGE (10/05/2020); and Cardiac catheterization. Family History: family history includes Alcohol Abuse in her mother; Cirrhosis in her mother. Social History:   reports that she has been smoking cigarettes. She has a 20.00 pack-year smoking history. She has never used smokeless tobacco. She reports that she does not currently use alcohol. She reports that she does not use drugs. Medications:    Prior to Admission medications    Medication Sig Start Date End Date Taking? Authorizing Provider   amLODIPine (NORVASC) 10 MG tablet Take 1 tablet by mouth daily 10/11/22  Yes Renee Wan MD   metoprolol tartrate (LOPRESSOR) 25 MG tablet Take 1 tablet by mouth 2 times daily 10/10/22  Yes Renee Wan MD   QUEtiapine (SEROQUEL) 25 MG tablet Take 1 tablet by mouth 2 times daily 10/10/22  Yes Renee Wan MD   lacosamide (VIMPAT) 100 MG TABS tablet Take 1 tablet by mouth 2 times daily for 90 days. 10/10/22 1/8/23 Yes Renee Wan MD   potassium chloride (KLOR-CON M) 10 MEQ extended release tablet Take 1 tablet by mouth 2 times daily (with meals) 10/10/22  Yes Juan Carlos Hope MD   miconazole (ZEASORB-AF) 2 % powder Apply topically 2 times daily. 10/10/22  Yes Juan Carlos Hope MD   STIMULANT LAXATIVE 8.6-50 MG per tablet TAKE 2 TABLETS BY MOUTH NIGHTLY. 10/6/22   Shanda Hinds DO   morphine (MS CONTIN) 15 MG extended release tablet TAKE 1 TABLET BY MOUTH 2 TIMES DAILY FOR 30 DAYS.  9/28/22 10/28/22  Tommy He MD   lamoTRIgine (LAMICTAL) 25 MG tablet Take 5 tablets by mouth 2 times daily 9/22/22   Shandajanie Hinds DO   LORazepam (ATIVAN) 1 MG tablet TAKE 1 TABLET BY MOUTH EVERY 8 HOURS AS NEEDED FOR ANXIETY FOR UP TO 30 DAYS. 9/21/22 10/21/22  Buel Oppenheim Al-Khalili, MD   morphine (MS CONTIN) 30 MG extended release tablet TAKE 1 TABLET BY MOUTH 2 TIMES DAILY FOR 30 DAYS. 9/21/22 10/21/22  Buel Oppenheim Al-Khalili, MD   sertraline (ZOLOFT) 100 MG tablet TAKE 1 TABLET BY MOUTH DAILY 9/21/22 10/21/22  Gracy Krishnan MD   oxyCODONE-acetaminophen (PERCOCET) 5-325 MG per tablet Take 1 tablet by mouth every 4 hours as needed for Pain for up to 30 days.  9/13/22 10/13/22  Corey Bhatia MD   FEROSUL 325 (65 Fe) MG tablet TAKE 1 TABLET BY MOUTH ONCE DAILY WITH BREAKFAST 9/9/22   Corey Bhatia MD   levETIRAcetam (KEPPRA) 1000 MG tablet TAKE 1 TABLET (1,000 MG TOTAL) BY MOUTH IN THE MORNING AND 1 TABLET (1,000 MG TOTAL) BEFORE BEDTIME. 9/9/22   Corey Bhatia MD   pantoprazole (PROTONIX) 40 MG tablet Take 1 tablet by mouth daily 9/1/22   Corey Bhatia MD   Lactobacillus (ACIDOPHILUS) CAPS capsule TAKE 1 TABLET BY MOUTH ONCE DAILY IN THE MORNING AND 1 TABLET BEFORE BEDTIME 8/31/22   Shanda Hinds DO   dicyclomine (BENTYL) 20 MG tablet TAKE 1 TABLET (20 MG TOTAL) BY MOUTH IN THE MORNING AND 1 TABLET (20 MG TOTAL) BEFORE BEDTIME. 8/29/22   Shanda Hinds DO   Handicap Placard [facility] [facility] 5 years 7/19/22   Yesi Hinds DO   loperamide (IMODIUM) 2 MG capsule Take 2 mg by mouth 4 times daily as needed for Diarrhea    Historical MD Luisa   ondansetron (ZOFRAN-ODT) 4 MG disintegrating tablet Take 1 tablet by mouth every 8 hours as needed for Nausea or Vomiting 5/13/22   Gracy Krishnan MD   senna (SENOKOT) 8.6 MG tablet Take 1 tablet by mouth 2 times daily 5/2/22 5/2/23  Rayna Leavitt MD   melatonin 5 MG TABS tablet Take 1 tablet by mouth daily 4/13/22   Shanda Hinds DO   benzonatate (TESSALON PERLES) 100 MG capsule Take 1 capsule by mouth 3 times daily as needed for Cough 4/13/22   Shanda Hinds DO   apixaban (ELIQUIS) 5 MG TABS tablet Take 1 tablet by mouth 2 times daily 4/8/22   Gracy Krishnan MD   hydrocortisone (ANUSOL-HC) 2.5 % CREA rectal cream Apply on affected area twice daily 2/28/22   Yonatan Broderick MD     Current Facility-Administered Medications   Medication Dose Route Frequency Provider Last Rate Last Admin    melatonin tablet 10 mg  10 mg Oral Once Anibal Loving MD        famotidine (PEPCID) tablet 20 mg  20 mg Oral Daily Yane Sports, DO   20 mg at 10/12/22 0824    amLODIPine (NORVASC) tablet 10 mg  10 mg Oral Daily Cherylene Rubinstein, MD   10 mg at 10/12/22 0824    metoprolol tartrate (LOPRESSOR) tablet 25 mg  25 mg Oral BID Cherylene Rubinstein, MD   25 mg at 10/12/22 0824    miconazole (MICOTIN) 2 % powder   Topical BID Chanda Araujo MD   Given at 10/11/22 2001    sodium chloride flush 0.9 % injection 10 mL  10 mL IntraVENous PRN Everton Tesfaye DO        QUEtiapine (SEROQUEL) tablet 25 mg  25 mg Oral BID Cherylene Rubinstein, MD   25 mg at 10/12/22 0824    LORazepam (ATIVAN) tablet 1 mg  1 mg Oral Q4H PRN Cherylene Rubinstein, MD   1 mg at 10/11/22 1952    lacosamide (VIMPAT) tablet 100 mg  100 mg Oral BID Tha Magsi, APRN - CNP   100 mg at 10/12/22 0826    levETIRAcetam (KEPPRA) tablet 1,000 mg  1,000 mg Oral BID Tanisha Daigle, DO   1,000 mg at 10/12/22 0824    docusate (COLACE) 50 MG/5ML liquid 100 mg  100 mg Oral Daily Howie Jacobs MD   100 mg at 10/08/22 0837    apixaban (ELIQUIS) tablet 5 mg  5 mg Oral BID Yane Sports, DO   5 mg at 10/12/22 9519    lamoTRIgine (LAMICTAL) tablet 125 mg  125 mg Oral BID Yane Sports, DO   125 mg at 10/12/22 0825    oxyCODONE-acetaminophen (PERCOCET) 5-325 MG per tablet 1 tablet  1 tablet Oral Q4H PRN Yane Sports, DO   1 tablet at 10/12/22 0320    sertraline (ZOLOFT) tablet 100 mg  100 mg Oral Daily Yane Sports, DO   100 mg at 10/12/22 0824    melatonin tablet 5 mg  5 mg Oral Nightly Yane Sports, DO   5 mg at 10/11/22 1952    morphine (PF) injection 2 mg  2 mg IntraVENous Q4H PRN Yane Sports, DO   2 mg at 10/05/22 0228    albuterol (PROVENTIL) nebulizer solution 2.5 mg  2.5 mg Nebulization Q6H PRN Corrinne Hipps, MD   2.5 mg at 10/08/22 0941    racepinephrine HCl (VAPONEFPRIN) 2.25 % nebulizer solution NEBU 11.25 mg  11.25 mg Nebulization Q3H PRN Corrinne Hipps, MD        sodium chloride nebulizer 0.9 % solution 3 mL  3 mL Nebulization Q4H PRN Soheila Patterson MD        sodium chloride flush 0.9 % injection 5-40 mL  5-40 mL IntraVENous 2 times per day Charlies Sale, DO   10 mL at 10/11/22 2001    sodium chloride flush 0.9 % injection 5-40 mL  5-40 mL IntraVENous PRN Charlies Sale, DO        0.9 % sodium chloride infusion   IntraVENous PRN Charlies Sale, DO   Stopped at 10/10/22 1232    ondansetron (ZOFRAN-ODT) disintegrating tablet 4 mg  4 mg Oral Q8H PRN Charlies Sale, DO        Or    ondansetron [facility]) injection 4 mg  4 mg IntraVENous Q6H PRN Charlies Sale, DO   4 mg at 10/10/22 1733    polyethylene glycol (GLYCOLAX) packet 17 g  17 g Oral Daily PRN Charlies Sale, DO        acetaminophen (TYLENOL) tablet 650 mg  650 mg Oral Q6H PRN Charlies Sale, DO   325 mg at 10/08/22 1800    Or    acetaminophen (TYLENOL) suppository 650 mg  650 mg Rectal Q6H PRN Charlies Sale, DO           Allergies:  Carboplatin and Ceftriaxone    REVIEW OF SYSTEMS:      General: Positive for weakness and fatigue. Positive for weight loss and decreased appetite. No fever or chills. Eyes: Positive visual hallucinations of people and animals on and off. No blurred vision, eye pain or double vision. Ears: Positive auditory hallucinations. No hearing problems or drainage. No tinnitus. Throat: No sore throat, problems with swallowing or dysphagia. Respiratory: Positive SOB on exertion. No cough, sputum or hemoptysis. No pleuritic chest pain. Cardiovascular: No chest pain, orthopnea or PND. No lower extremity edema. No palpitation. Gastrointestinal: Positive diarrhea and nausea. denies blood in stool. No problems with swallowing. No abdominal pain or bloating. No constipation. No GI bleeding. Genitourinary: No dysuria, hematuria, frequency or urgency. Musculoskeletal: No muscle aches or pains. No limitation of movement. No back pain. No gait disturbance, No joint complaints.   Dermatologic: No skin rashes or pruritus. No skin lesions or discolorations. Psychiatric: As above. Hematologic: No history of bleeding tendency. No bruises or ecchymosis. No history of clotting problems. Infectious disease: No fever, chills or frequent infections. Endocrine: No polydipsia or polyuria. No temperature intolerance. Neurologic: Positive dizziness. No weakness or numbness of the extremities. No changes in balance, coordination,  memory, mentation, behavior. Allergic/Immunologic: No nasal congestion or hives. No repeated infections.        PHYSICAL EXAM:      /71   Pulse 96   Temp 98.1 °F (36.7 °C) (Oral)   Resp 24   Ht 5' 5\" (1.651 m)   Wt 151 lb 3.8 oz (68.6 kg)   SpO2 97%   BMI 25.17 kg/m²    Temp (24hrs), Av.1 °F (36.7 °C), Min:97.9 °F (36.6 °C), Max:98.3 °F (36.8 °C)      General appearance - not in pain or distress  Mental status - alert and oriented  Eyes - pupils equal and reactive, extraocular eye movements intact  Ears - bilateral TM's and external ear canals normal  Nose - normal and patent, no erythema, discharge or polyps  Mouth - mucous membranes moist, pharynx normal without lesions  Neck - supple, no significant adenopathy  Lymphatics - no palpable lymphadenopathy, no hepatosplenomegaly  Chest - clear to auscultation, no wheezes, rales or rhonchi, symmetric air entry  Heart - normal rate, regular rhythm, normal S1, S2, no murmurs, rubs, clicks or gallops  Abdomen - soft, nontender, nondistended, no masses or organomegaly  Neurological - alert, oriented, normal speech, no focal findings or movement disorder noted  Musculoskeletal - no joint tenderness, deformity or swelling  Extremities - peripheral pulses normal, no pedal edema, no clubbing or cyanosis  Skin - normal coloration and turgor, no rashes, no suspicious skin lesions noted           DATA:      Labs:       CBC:   Recent Labs     10/11/22  0457 10/12/22  0753   WBC 9.4 9.5   HGB 11.3* 12.0   HCT 33.3* 37.8   * 341       BMP: Recent Labs     10/11/22  0457 10/12/22  0753    141   K 3.7 3.3*   CO2 25 26   BUN 13 18   CREATININE 1.15* 1.10*   LABGLOM 55* 58*   GLUCOSE 124* 108*       PT/INR: No results for input(s): PROTIME, INR in the last 72 hours. APTT:No results for input(s): APTT in the last 72 hours. LIVER PROFILE:No results for input(s): AST, ALT, LABALBU in the last 72 hours. MRI BRAIN W WO CONTRAST  Narrative: EXAMINATION:  MRI OF THE BRAIN WITHOUT AND WITH CONTRAST  10/9/2022 9:35 am    TECHNIQUE:  Multiplanar multisequence MRI of the head/brain was performed without and  with the administration of intravenous contrast.    COMPARISON:  Brain MRI done 10/04/2022 and 02/24/2022. HISTORY:  ORDERING SYSTEM PROVIDED HISTORY: revaluation of parenchyma eveluate for PRES  or other lesions  TECHNOLOGIST PROVIDED HISTORY:  revaluation of parenchyma eveluate for PRES or other lesions  Reason for Exam: revaluation of parenchyma, PRES or other lesions    FINDINGS:  INTRACRANIAL STRUCTURES/VENTRICLES: Patchy cortical/subcortical FLAIR  hyperintense signal in the right greater than left occipital lobes is similar  to the brain MRI done October 4, 2022 and 02/24/2022. Small focal area of  cortical/subcortical FLAIR hyperintense signal in the left posterior frontal  lobe is similar compared to brain MRI done October 4, 2022 but new compared  to brain MRI done 02/24/2022. These areas of FLAIR hyperintense signal  demonstrate no correlating postcontrast enhancement, diffusion restriction or  gradient blooming. There is no acute infarct. No mass effect or midline shift. No evidence of an  acute intracranial hemorrhage. Generalized cerebral and cerebellar volume  loss. No deonte hydrocephalus. The sellar/suprasellar regions appear  unremarkable. The normal signal voids within the major intracranial vessels  appear maintained. No abnormal focus of enhancement is seen within the  brain.   The axial FLAIR images demonstrate mild bilateral periventricular and  deep white matter signal hyperintensities which are nonspecific but commonly  seen in the setting of chronic small vessel ischemic disease. ORBITS: The visualized portion of the orbits demonstrate no acute abnormality. SINUSES: The visualized paranasal sinuses and mastoid air cells demonstrate  no acute abnormality. BONES/SOFT TISSUES: The bone marrow signal intensity appears normal. The soft  tissues demonstrate no acute abnormality. Impression: 1. Overall, findings are similar compared to brain MRI done October 4, 2022  with redemonstration of patchy areas of FLAIR hyperintense signal in the left  posterior frontal lobe and right greater than left occipital lobes which may  represent small old infarcts. 2.  No acute stroke, intracranial hemorrhage or abnormal enhancement. 3.  Mild chronic small vessel ischemic disease. IMPRESSION:    Primary Problem  Septicemia [facility])    Active Hospital Problems    Diagnosis Date Noted    Septicemia ([address] [A41.9] 10/03/2022     Priority: High    Metabolic encephalopathy [N85.98]     Breakthrough seizure ([address] Aye Friendly     AMS (altered mental status) [R41.82] 02/20/2022    Gastroesophageal reflux disease [K21.9] 01/08/2022    Iron deficiency anemia secondary to inadequate dietary iron intake [D50.8] 05/28/2021    Chronic deep vein thrombosis (DVT) of femoral vein of left lower extremity ([address] [I82.512] 03/11/2021    Cancer, metastatic to bone ([address] [C79.51] 01/28/2021    Seizure disorder, status epilepticus, nonconvulsive ([address] [G40.901] 01/04/2021    Acute encephalopathy [G93.40]     Seizure ([address] [R56.9] 10/21/2020    Malignant neoplasm of ovary ([address] [C56.9] 08/17/2020   Altered mental status. Sepsis. Recurrent metastatic ovarian cancer on chemotherapy treatment. RECOMMENDATIONS:  Records and labs and images were reviewed and discussed with the patient.   Patient was treated for sepsis and she was extubated successfully. She had metabolic encephalopathy which has resolved. MRI findings similar to previous MRI. Patient is currently on active chemotherapy treatment for ovarian cancer. Last treatment was September 29, 2022. We will monitor labs closely. Further management for ovarian cancer will be as outpatient. Patient's questions were answered to the best of her satisfaction and she verbalized full understanding and agreement. Thank you for allowing us to participate in the care of this pleasant patient. Discussed with patient and Nurse. Priya Marroquin, OMS-III  Hematology-Oncology        Attending Physician Statement   I have discussed the care of this patient, including pertinent history and exam findings, with the resident. I have seen and examined the patient and the key elements of all parts of the encounter have been performed by me. I agree with the assessment, plan and orders as documented by the resident and with changes made to the note.     Ifeoma Arreola MD  Hematology/Oncology    Cell: [phone]

## 2022-10-12 NOTE — ADT AUTH CERT
Utilization Reviews       Sepsis and Other Febrile Illness, without Focal Infection - Care Day 8 (10/10/2022) by Aurelia Cortes RN       Review Status Review Entered   Completed 10/12/2022 1005       Created By   Aurelia Cortes RN      Criteria Review      Care Day: 8 Care Date: 10/10/2022 Level of Care: Intermediate Care    Guideline Day 2    Level Of Care    (X) ICU or floor    10/12/2022 10:05 AM EDT by Efrain Giron      PCU    Clinical Status    ( ) * Hemodynamic stability    10/12/2022 10:02 AM EDT by Elsie Hercules: 99.2  TX: 109  BP: 156/85  Pain score  8    (X) * Hypoxemia absent    10/12/2022 10:02 AM EDT by Efrain Giron      94% on ra    ( ) * Tachypnea absent    10/12/2022 10:02 AM EDT by Efrain Giron      RR: 30    Routes    (X) Parenteral or oral medications    10/12/2022 10:05 AM EDT by Efrain Giron      Vimpat 100mg BID PO   Lamictal 125mg BID PO   Keppra 1000mg BID PO    Mgso4 2000mg Once IV  Lopressor 25mg BID PO  Klor-con 10meq BID PC PO  Seroquel 25mg BID PO  Klor-con 40meq Once PO x2  Zoloft 100mg daily PO   Ativan 1mg q4 PRN PO x1    (X) Diet as tolerated    10/12/2022 10:05 AM EDT by Damaris weber.     Interventions    (X) WBC    10/12/2022 10:05 AM EDT by Efrain Giron      WBC     8.6    Medications    (X) Possible antimicrobial treatment    10/12/2022 10:05 AM EDT by Efrain Giron      Levaquin 500mg Daily PO    (X) Possible DVT prophylaxis    10/12/2022 10:05 AM EDT by Efrain Giron      PCD       Definitions for Care Day 8    Hemodynamic stability    ( ) Hemodynamic stability, as indicated by  1 or more  of the following :       ( ) Patient hemodynamically stable, as indicated by  ALL  of the following  (1) (2) (3) (4) (5):          (X) Hypotension absent          (X) No evidence of inadequate perfusion (eg, no myocardial ischemia)          (X) No other hemodynamic abnormalities (eg, no Orthostatic hypotension) Hypotension absent    (X) Hypotension absent, as indicated by  1 or more  of the following  (1) (2) (3) (4):       (X) SBP greater than or equal to 90 mm Hg and without recent decrease greater than 40 mm Hg from       baseline in adult or child 10 years or older       Hypoxemia absent    (X) Hypoxemia absent, as indicated by  1 or more  of the following  (1):       (X) Arterial oxygen saturation (SaO2) of 90% or greater or arterial partial pressure of oxygen       (PO2) of 60 mm Hg (8.0 kPa) or greater on room air [A]       * Milestone   Additional Notes   DATE:  10/10/22  PCU         PERTINENT UPDATES:   Pt. states she continues to have auditory hallucinations. Potassium trended down today to 2.9, Crea is still high. RBC, H&H are low. ABNL/PERTINENT LABS/RADIOLOGY/DIAGNOSTIC STUDIES:   Gluc     101 H      Crea     1.10 H   GFR      58 L      Ca      8.5 L   K        2.9 ! L      RBC     3.89 L   H&H     11.7/36.1 L         EKG   Vent rate   102   Narrative & Impression:   Sinus tachycardia with Fusion complexes   Possible Left atrial enlargement   Left axis deviation   Septal infarct, age undetermined   Abnormal ECG         PHYSICAL EXAM:   Respiratory:  Breath sounds bilaterally were clear to auscultation. There were no wheezes, rhonchi or rales. There is no intercostal retraction or use of accessory muscles. Cardiovascular: Regular without murmur, clicks, gallops or rubs. Neurological/Psychiatric: The patient's general behavior, level of consciousness, thought content and emotional status is normal.             MD CONSULTS/ASSESSMENT AND PLAN:   IM:   Assessment & Plan:   Acute metabolic encephalopathy   - Neurology consulted, LTME discontinued, suggests underlying structural defect and increased risk for focal onset seizures.        Subclinical seizures   -Follow-up MRI brain is stable compared to prior image, no obvious evidence of PRES, no evidence of metastatic lesions to the brain NEURO.:   Impression:   -Acute metabolic encephalopathy in the setting of hypertensive urgency, febrile illness with positive SIRS criteria and subclinical status. Plan:   -MRI brain with no clear evidence of PRES, no evidence of mets to the brain   -No driving or operating heavy machinery until cleared by the outpatient neurology office. Avoid open water, fire, and climbing to high heights.   -We will follow while she is here. Will need outpatient follow up in 4-6 weeks. HEM/ONCO.:   IMPRESSION:     Primary Problem   Septicemia       Active Hospital Problems   ·Metabolic encephalopathy       Recommendations: 1. Patient is currently on active chemotherapy treatment for ovarian cancer. Last treatment was September 29, 2022. 3.Further management for ovarian cancer will be as outpatient. MEDICATIONS:   Zofran 4mg q6 PRN IV x1    Percocet 1 tab. q4 PRN PO x2          ORDERS:   Inpatient consult to Cardiology, Neurovascular checks q4, Call for urine ouput less than 0.5mL/kg/hr. PT/OT/SLP/CM ASSESSMENT OR NOTES:   CM:   Discussed SNF placement with patient, she agrees to review SNF list and provide choices after she discusses them with her daughter. Advised to provide three choices.         Sepsis and Other Febrile Illness, without Focal Infection - Care Day 6 (10/8/2022) by Guillermo Valdez RN       Review Status Review Entered   Completed 10/12/2022 1001       Created By   Guillermo Valdez RN      Criteria Review      Care Day: 6 Care Date: 10/8/2022 Level of Care: Intermediate Care    Guideline Day 2    Level Of Care    (X) ICU or floor    10/12/2022 9:28 AM EDT by Amisha Garcia      PCU    Clinical Status    ( ) * Hemodynamic stability    10/12/2022 10:01 AM EDT by Joe Wilkins:  T: 99  NM: 101  BP: 158/81    (X) * Hypoxemia absent    10/12/2022 10:01 AM EDT by Amisha Garcia      93% on ra    (X) * Tachypnea absent    10/12/2022 10:01 AM EDT by Kodak Mahmood Can      RR:  19    Activity    ( ) Activity as tolerated    10/12/2022 9:59 AM EDT by Laisha Rodriguez      Pt amb 10ft x2 with MIN A no Ad as per PT. Routes    (X) Possible IV fluids    10/12/2022 9:59 AM EDT by Laisha Rodriguez      0.9 Nacl 100 ml/hr CONT. IV    (X) Parenteral or oral medications    10/12/2022 9:59 AM EDT by Laisha Rodriguez      Vimpat 100mg BID PO   Lamictal 125mg BID PO   Keppra 1000mg BID PO   Klor-con 40meq Once PO  Zoloft 100mg daily PO   Ativan 1mg q4 PRN PO x1   Zofran 4mg q6 PRN IV x1   Percocet 1 tab.  q4 PRN PO x2    (X) Diet as tolerated    10/12/2022 9:59 AM EDT by Aleksey Jackson diet    Interventions    (X) WBC    10/12/2022 9:28 AM EDT by Laisha Rodriguez      WBC   7.7    Medications    (X) Possible antimicrobial treatment    10/12/2022 9:59 AM EDT by Laisha Rodriguez      Levaquin 500mg Daily PO    (X) Possible DVT prophylaxis    10/12/2022 9:59 AM EDT by Laisha Rodriguez      PCD       Definitions for Care Day 6    Hemodynamic stability    ( ) Hemodynamic stability, as indicated by  1 or more  of the following :       ( ) Patient hemodynamically stable, as indicated by  ALL  of the following  (1) (2) (3) (4) (5):          (X) Hypotension absent          (X) No evidence of inadequate perfusion (eg, no myocardial ischemia)          (X) No other hemodynamic abnormalities (eg, no Orthostatic hypotension)       Hypotension absent    (X) Hypotension absent, as indicated by  1 or more  of the following  (1) (2) (3) (4):       (X) SBP greater than or equal to 90 mm Hg and without recent decrease greater than 40 mm Hg from       baseline in adult or child 10 years or older       Hypoxemia absent    (X) Hypoxemia absent, as indicated by  1 or more  of the following  (1):       (X) Arterial oxygen saturation (SaO2) of 90% or greater or arterial partial pressure of oxygen       (PO2) of 60 mm Hg (8.0 kPa) or greater on room air [A]       Tachypnea absent    (X) Tachypnea absent, as indicated by respiratory rate of  1 or more  of the following  (1) (2):       (X) Less than or equal to 18 breaths per minute in adult or child 15years of age or older       * Milestone   Additional Notes   DATE:  10/8/22  ICU to PCU         PERTINENT UPDATES:   Still c/o hallucinations as per Neuro. Still on IV fluids. Crea is High & Hypokalemia present. Pt is very impulsive and unsafe amb without skilled assist as per PT.         EEG: 10/8   Summary:    During this day of recording no events were recorded. Continuous left hemispheric slowing suggested underlying structural defect. Occasional left central parietal sharp waves and lateralized periodic discharges (LPDs) conferred an increased risk for focal onset seizures. Monitoring was continued in order to record the patient's typical events. The EKG channel revealed no abnormalities. ABNL/PERTINENT LABS/RADIOLOGY/DIAGNOSTIC STUDIES:   Gluc     107 H      Crea    1.03 H   K        3.5 L         PHYSICAL EXAM:   Respiratory: Chest was symmetrical without dullness to percussion. Breath sounds bilaterally were clear to auscultation. There were no wheezes, rhonchi or rales. There is no intercostal retraction or use of accessory muscles. No egophony noted. Cardiovascular: Regular without murmur, clicks, gallops or rubs. Neurological/Psychiatric: The patient's general behavior, level of consciousness, thought content and emotional status is normal.          MD CONSULTS/ASSESSMENT AND PLAN:   IM:   ASSESSMENT / PLAN:    Acute metabolic encephalopathy   -Mentation improved, ongoing neurology work-up.    Subclinical seizures   -Follow-up MRI brain ordered by neurology for PRES versus brain metastasis   Primary hypertension   - Started on Norvasc 5 mg daily   - Monitor blood pressure         PULMO:   Assessment:    Principal Problem:     Seizure   Active Problems:   CAP    Malignant neoplasm       Plan:   Complete levoquin course AED per neurology          HEM/ONCO.:   IMPRESSION:     Primary Problem   Septicemia      Recommendations:   Patient pending repeat MRI. NEURO.:   Impression:   Patient reported that auditory hallucinations are \"driving her crazy\". Otherwise her mentation has improved over time; she was A&O other than thought it was 2023. PLAN:   MRI brain w/wo contrast - still awaited         ORDERS:   Call for urine ouput less than 0.5mL/kg/hr, Neurovascular checks q4, Cont. above meds. PT/OT/SLP/CM ASSESSMENT OR NOTES:   PT:   Assessment:   Pt amb 10ft x2 with MIN A no Ad,Pt is very impulsive and unsafe amb without skilled assist. Limited by impulsiveness and poor safety awareness. Pt has no insight to deficits making her a high fall risk. Recommending continued therapy to address deficits. Pt will require 24hrs supervision and assistance with mobility upon discharge      Discharge Recommendations:   Patient would benefit from continued therapy after discharge       Plan    General Plan:  (5-6x/wk)         Dietitian:   Nutrition Recommendations/Plan: 1. Start high protein/high calorie ONS   2. Monitor weight, labs and intake

## 2022-10-13 LAB
ABSOLUTE EOS #: 0.47 K/UL (ref 0–0.44)
ABSOLUTE IMMATURE GRANULOCYTE: 0.07 K/UL (ref 0–0.3)
ABSOLUTE LYMPH #: 0.98 K/UL (ref 1.1–3.7)
ABSOLUTE MONO #: 0.94 K/UL (ref 0.1–1.2)
ANION GAP SERPL CALCULATED.3IONS-SCNC: 13 MMOL/L (ref 9–17)
BASOPHILS # BLD: 0 % (ref 0–2)
BASOPHILS ABSOLUTE: 0.03 K/UL (ref 0–0.2)
BUN BLDV-MCNC: 20 MG/DL (ref 6–20)
CALCIUM SERPL-MCNC: 8.7 MG/DL (ref 8.6–10.4)
CHLORIDE BLD-SCNC: 102 MMOL/L (ref 98–107)
CO2: 24 MMOL/L (ref 20–31)
CREAT SERPL-MCNC: 1.11 MG/DL (ref 0.5–0.9)
EOSINOPHILS RELATIVE PERCENT: 5 % (ref 1–4)
GFR SERPL CREATININE-BSD FRML MDRD: 57 ML/MIN/1.73M2
GLUCOSE BLD-MCNC: 107 MG/DL (ref 65–105)
GLUCOSE BLD-MCNC: 109 MG/DL (ref 70–99)
GLUCOSE BLD-MCNC: 110 MG/DL (ref 65–105)
HCT VFR BLD CALC: 35.6 % (ref 36.3–47.1)
HEMOGLOBIN: 11.3 G/DL (ref 11.9–15.1)
IMMATURE GRANULOCYTES: 1 %
LYMPHOCYTES # BLD: 11 % (ref 24–43)
MCH RBC QN AUTO: 29.7 PG (ref 25.2–33.5)
MCHC RBC AUTO-ENTMCNC: 31.7 G/DL (ref 28.4–34.8)
MCV RBC AUTO: 93.7 FL (ref 82.6–102.9)
MONOCYTES # BLD: 10 % (ref 3–12)
NRBC AUTOMATED: 0 PER 100 WBC
PDW BLD-RTO: 17.3 % (ref 11.8–14.4)
PLATELET # BLD: 395 K/UL (ref 138–453)
PMV BLD AUTO: 9.4 FL (ref 8.1–13.5)
POTASSIUM SERPL-SCNC: 3.9 MMOL/L (ref 3.7–5.3)
RBC # BLD: 3.8 M/UL (ref 3.95–5.11)
RBC # BLD: ABNORMAL 10*6/UL
SEG NEUTROPHILS: 73 % (ref 36–65)
SEGMENTED NEUTROPHILS ABSOLUTE COUNT: 6.63 K/UL (ref 1.5–8.1)
SODIUM BLD-SCNC: 139 MMOL/L (ref 135–144)
WBC # BLD: 9.1 K/UL (ref 3.5–11.3)

## 2022-10-13 PROCEDURE — 2060000000 HC ICU INTERMEDIATE R&B

## 2022-10-13 PROCEDURE — 2580000003 HC RX 258: Performed by: STUDENT IN AN ORGANIZED HEALTH CARE EDUCATION/TRAINING PROGRAM

## 2022-10-13 PROCEDURE — 6370000000 HC RX 637 (ALT 250 FOR IP)

## 2022-10-13 PROCEDURE — 6370000000 HC RX 637 (ALT 250 FOR IP): Performed by: NURSE PRACTITIONER

## 2022-10-13 PROCEDURE — 6370000000 HC RX 637 (ALT 250 FOR IP): Performed by: PSYCHIATRY & NEUROLOGY

## 2022-10-13 PROCEDURE — 36415 COLL VENOUS BLD VENIPUNCTURE: CPT

## 2022-10-13 PROCEDURE — 99254 IP/OBS CNSLTJ NEW/EST MOD 60: CPT | Performed by: PSYCHIATRY & NEUROLOGY

## 2022-10-13 PROCEDURE — 85025 COMPLETE CBC W/AUTO DIFF WBC: CPT

## 2022-10-13 PROCEDURE — 80048 BASIC METABOLIC PNL TOTAL CA: CPT

## 2022-10-13 PROCEDURE — 97116 GAIT TRAINING THERAPY: CPT

## 2022-10-13 PROCEDURE — 82947 ASSAY GLUCOSE BLOOD QUANT: CPT

## 2022-10-13 PROCEDURE — 6370000000 HC RX 637 (ALT 250 FOR IP): Performed by: STUDENT IN AN ORGANIZED HEALTH CARE EDUCATION/TRAINING PROGRAM

## 2022-10-13 PROCEDURE — 99232 SBSQ HOSP IP/OBS MODERATE 35: CPT | Performed by: INTERNAL MEDICINE

## 2022-10-13 PROCEDURE — 97110 THERAPEUTIC EXERCISES: CPT

## 2022-10-13 PROCEDURE — 99232 SBSQ HOSP IP/OBS MODERATE 35: CPT | Performed by: PSYCHIATRY & NEUROLOGY

## 2022-10-13 RX ORDER — OLANZAPINE 5 MG/1
5 TABLET ORAL NIGHTLY
Qty: 30 TABLET | Refills: 0 | Status: ON HOLD | OUTPATIENT
Start: 2022-10-13 | End: 2022-11-01 | Stop reason: HOSPADM

## 2022-10-13 RX ORDER — OLANZAPINE 5 MG/1
5 TABLET ORAL NIGHTLY
Status: DISCONTINUED | OUTPATIENT
Start: 2022-10-13 | End: 2022-10-17 | Stop reason: HOSPADM

## 2022-10-13 RX ORDER — LOPERAMIDE HYDROCHLORIDE 2 MG/1
2 CAPSULE ORAL 3 TIMES DAILY PRN
Status: DISCONTINUED | OUTPATIENT
Start: 2022-10-13 | End: 2022-10-17 | Stop reason: HOSPADM

## 2022-10-13 RX ADMIN — SERTRALINE 100 MG: 50 TABLET, FILM COATED ORAL at 09:22

## 2022-10-13 RX ADMIN — SODIUM CHLORIDE, PRESERVATIVE FREE 10 ML: 5 INJECTION INTRAVENOUS at 09:25

## 2022-10-13 RX ADMIN — LEVETIRACETAM 750 MG: 500 TABLET, FILM COATED ORAL at 21:06

## 2022-10-13 RX ADMIN — OXYCODONE HYDROCHLORIDE AND ACETAMINOPHEN 1 TABLET: 5; 325 TABLET ORAL at 03:54

## 2022-10-13 RX ADMIN — FAMOTIDINE 20 MG: 20 TABLET, FILM COATED ORAL at 10:14

## 2022-10-13 RX ADMIN — SODIUM CHLORIDE, PRESERVATIVE FREE 10 ML: 5 INJECTION INTRAVENOUS at 21:12

## 2022-10-13 RX ADMIN — LAMOTRIGINE 125 MG: 100 TABLET ORAL at 21:11

## 2022-10-13 RX ADMIN — OXYCODONE HYDROCHLORIDE AND ACETAMINOPHEN 1 TABLET: 5; 325 TABLET ORAL at 19:33

## 2022-10-13 RX ADMIN — APIXABAN 5 MG: 5 TABLET, FILM COATED ORAL at 09:24

## 2022-10-13 RX ADMIN — LEVETIRACETAM 1000 MG: 500 TABLET, FILM COATED ORAL at 09:23

## 2022-10-13 RX ADMIN — OLANZAPINE 5 MG: 5 TABLET, FILM COATED ORAL at 21:10

## 2022-10-13 RX ADMIN — APIXABAN 5 MG: 5 TABLET, FILM COATED ORAL at 21:11

## 2022-10-13 RX ADMIN — OXYCODONE HYDROCHLORIDE AND ACETAMINOPHEN 1 TABLET: 5; 325 TABLET ORAL at 08:02

## 2022-10-13 RX ADMIN — AMLODIPINE BESYLATE 10 MG: 10 TABLET ORAL at 09:24

## 2022-10-13 RX ADMIN — ONDANSETRON 4 MG: 4 TABLET, ORALLY DISINTEGRATING ORAL at 19:32

## 2022-10-13 RX ADMIN — LACOSAMIDE 150 MG: 100 TABLET, FILM COATED ORAL at 21:10

## 2022-10-13 RX ADMIN — ANTI-FUNGAL POWDER MICONAZOLE NITRATE TALC FREE: 1.42 POWDER TOPICAL at 21:05

## 2022-10-13 RX ADMIN — OXYCODONE HYDROCHLORIDE AND ACETAMINOPHEN 1 TABLET: 5; 325 TABLET ORAL at 15:34

## 2022-10-13 RX ADMIN — LORAZEPAM 1 MG: 1 TABLET ORAL at 21:16

## 2022-10-13 RX ADMIN — LAMOTRIGINE 125 MG: 100 TABLET ORAL at 09:24

## 2022-10-13 RX ADMIN — METOPROLOL TARTRATE 25 MG: 25 TABLET ORAL at 09:23

## 2022-10-13 RX ADMIN — QUETIAPINE FUMARATE 25 MG: 25 TABLET ORAL at 09:22

## 2022-10-13 RX ADMIN — METOPROLOL TARTRATE 25 MG: 25 TABLET ORAL at 21:10

## 2022-10-13 RX ADMIN — ANTI-FUNGAL POWDER MICONAZOLE NITRATE TALC FREE: 1.42 POWDER TOPICAL at 09:24

## 2022-10-13 RX ADMIN — LACOSAMIDE 100 MG: 100 TABLET, FILM COATED ORAL at 09:24

## 2022-10-13 RX ADMIN — LORAZEPAM 1 MG: 1 TABLET ORAL at 08:02

## 2022-10-13 RX ADMIN — Medication 5 MG: at 21:11

## 2022-10-13 RX ADMIN — LOPERAMIDE HYDROCHLORIDE 2 MG: 2 CAPSULE ORAL at 16:24

## 2022-10-13 ASSESSMENT — PAIN DESCRIPTION - LOCATION
LOCATION: BACK

## 2022-10-13 ASSESSMENT — PAIN DESCRIPTION - DESCRIPTORS
DESCRIPTORS: DULL
DESCRIPTORS: DULL

## 2022-10-13 ASSESSMENT — PAIN SCALES - GENERAL
PAINLEVEL_OUTOF10: 8
PAINLEVEL_OUTOF10: 8
PAINLEVEL_OUTOF10: 6
PAINLEVEL_OUTOF10: 7
PAINLEVEL_OUTOF10: 8

## 2022-10-13 NOTE — CARE COORDINATION
CM spoke with patient at bedside to discuss transitional planning. Patient is still refusing SNF at this time. CM updated patient that all [facility] agencies listed as in network with [employer] have declined due to staffing reasons. Patient states that she will be fine discharging home without [facility] services. Patient states that she has had services with [facility] in the past and they were not listed as in network. Referral sent to [facility]. 80- CM called and spoke with [address] from [facility] who states that patient had [employer] when she was with them in the past. [facility] is not in network with [facility] and is unable to accept patient. 0900- Patient is now wanting a SNF at discharge. CM provided patient with hospital SNF list and will follow up with patient later this morning for choices. 1130- Patient and her daughter provided SNF choices of 1. [facility] 2. [facility] 3. [facility] \1. [facility]. Referrals sent to all. Await acceptance. 1230- Return call received from Yoselyn Cuevas with [address] stating they have no bed availability. 1320- CM called and spoke with Ronny from [facility] who states that they have no bed availability. CM called and left message requesting call back for Casandra with [facility]. 1400- Message received from Toney Colon with [facility] stating they are unable to accept patient d/t no bed availability. CM called and left message for Alina Porter with [facility] requesting call back. 1500- Additional SNF choices provided by patients daughter of [facility], [facility], [facility], and [facility]. Referrals sent to all. 1630- Jeferson with [facility] and states they are able to accept patient. Precert to be initiated today. 1700- CM spoke with patient at bedside and she states that she is agreeable to discharging to [address] [place] [address]. Await precert.

## 2022-10-13 NOTE — VIRTUAL HEALTH
Inpatient consult to Psychiatry  Consult performed by: Francisco Raines MD  Consult ordered by: Elena Velásquez MD  Reason for consult: Hallucinations and depression      Patient Location:  [address]    Department of Psychiatry  ECT consult   Psychiatric Assessment      Thank you very much for allowing us to participate in the care of this patient. Reason for Consult:  hallucinations    HISTORY OF PRESENT ILLNESS:          The patient is a 59 y.o. female with significant history of ovarian cancer, sepsis who is admitted medically due to sepsis    Patient has metastatic cancer. She is developed epileptic seizures. EEG earlier on admission showed seizure activity in the 42nd range. Patient reports that she is having hallucinations but upon clarification these hallucinations are approximately 30 to 60 seconds repetitive loops of something that somebody just said. She states it is like the same thing is being repeated on a loop over and over and over. They come in burst in the last approximately half a minute to 1 minute. It is quite possible that this is due to seizure activity. They are not mood congruent voices but clearly repeated statements that were heard just prior to the event. She does find them distressing. Patient also reports depressed mood, secondary to multiple tragedies in her family. Her youngest son overdosed in 2018 and . Her older son  in 2022 secondary to multiple myeloma and succumbing to cancer. She reports losing a mother figure as well. She does feel down or depressed nearly all day every day, has prominent anhedonia, poor appetite, low energy and disturbed sleep which she attributes to be worsening due to her depression. She denies suicidal ideation. Denies any mood congruent auditory hallucinations. Denying paranoia.       After discussion of risk benefits and alternatives we decided to continue with her Zoloft but switch her Seroquel to olanzapine. Thought process is that olanzapine is a higher D2 blocker and still FDA approved augment for treatment of depression. Did discuss with the patient the possibility that D2 blockade may not be the issue with these hallucinations if they in fact are secondary to epileptic activity, patient expresses understanding this and wishes to proceed with olanzapine for augmentation of mood, the mere possibility that it could help with voices, as well as desirable side effects of sleep and appetite stimulation. PSYCHIATRIC HISTORY:      Outpatient psychiatric provider:  none  Suicide attempts: denies  Inpatient psychiatric admissions: denies    Past psychiatric medications includes:     Ativan, zoloft,    Adverse reactions from psychotropic medications:    denies      Lifetime Psychiatric Review of Systems         Obsessions and Compulsions: Denies       Isela or Hypomania: Denies     Hallucinations: Denies     Panic Attacks:  Denies     Delusions:  Denies     Phobias:  Denies     Trauma: Denies    Prior to Admission medications    Medication Sig Start Date End Date Taking? Authorizing Provider   amLODIPine (NORVASC) 10 MG tablet Take 1 tablet by mouth daily 10/11/22  Yes Thee Sorto MD   metoprolol tartrate (LOPRESSOR) 25 MG tablet Take 1 tablet by mouth 2 times daily 10/10/22  Yes Thee Sorto MD   QUEtiapine (SEROQUEL) 25 MG tablet Take 1 tablet by mouth 2 times daily 10/10/22  Yes Thee Sorto MD   lacosamide (VIMPAT) 100 MG TABS tablet Take 1 tablet by mouth 2 times daily for 90 days. 10/10/22 1/8/23 Yes Thee Sorto MD   miconazole (ZEASORB-AF) 2 % powder Apply topically 2 times daily. 10/10/22  Yes Kami Onofre MD   STIMULANT LAXATIVE 8.6-50 MG per tablet TAKE 2 TABLETS BY MOUTH NIGHTLY. 10/6/22   Shanda Medhkour, DO   morphine (MS CONTIN) 15 MG extended release tablet TAKE 1 TABLET BY MOUTH 2 TIMES DAILY FOR 30 DAYS.  9/28/22 10/28/22  Obey Lynn MD lamoTRIgine (LAMICTAL) 25 MG tablet Take 5 tablets by mouth 2 times daily 9/22/22   Shanda Hinds DO   LORazepam (ATIVAN) 1 MG tablet TAKE 1 TABLET BY MOUTH EVERY 8 HOURS AS NEEDED FOR ANXIETY FOR UP TO 30 DAYS. 9/21/22 10/21/22  Arnulfo Bhatia MD   morphine (MS CONTIN) 30 MG extended release tablet TAKE 1 TABLET BY MOUTH 2 TIMES DAILY FOR 30 DAYS. 9/21/22 10/21/22  Arnulfo Bhatia MD   sertraline (ZOLOFT) 100 MG tablet TAKE 1 TABLET BY MOUTH DAILY 9/21/22 10/21/22  Amparo Castellano MD   oxyCODONE-acetaminophen (PERCOCET) 5-325 MG per tablet Take 1 tablet by mouth every 4 hours as needed for Pain for up to 30 days.  9/13/22 10/13/22  Arnulfo Bhatia MD   FEROSUL 325 (65 Fe) MG tablet TAKE 1 TABLET BY MOUTH ONCE DAILY WITH BREAKFAST 9/9/22   Arnulfo Bhatia MD   levETIRAcetam (KEPPRA) 1000 MG tablet TAKE 1 TABLET (1,000 MG TOTAL) BY MOUTH IN THE MORNING AND 1 TABLET (1,000 MG TOTAL) BEFORE BEDTIME. 9/9/22   Arnulfo Bhatia MD   pantoprazole (PROTONIX) 40 MG tablet Take 1 tablet by mouth daily 9/1/22   Arnulfo Bhatia MD   Lactobacillus (ACIDOPHILUS) CAPS capsule TAKE 1 TABLET BY MOUTH ONCE DAILY IN THE MORNING AND 1 TABLET BEFORE BEDTIME 8/31/22   Shanda Hinds DO   dicyclomine (BENTYL) 20 MG tablet TAKE 1 TABLET (20 MG TOTAL) BY MOUTH IN THE MORNING AND 1 TABLET (20 MG TOTAL) BEFORE BEDTIME. 8/29/22   Shanda Hinds DO   Handicap Placard MISC 5 years 7/19/22   Moreno Hinds DO   loperamide (IMODIUM) 2 MG capsule Take 2 mg by mouth 4 times daily as needed for Diarrhea    Historical Provider, MD   ondansetron (ZOFRAN-ODT) 4 MG disintegrating tablet Take 1 tablet by mouth every 8 hours as needed for Nausea or Vomiting 5/13/22   Amparo Castellano MD   senna (SENOKOT) 8.6 MG tablet Take 1 tablet by mouth 2 times daily 5/2/22 5/2/23  Junior Bains MD   melatonin 5 MG TABS tablet Take 1 tablet by mouth daily 4/13/22   Shanda Medhkour, DO   benzonatate (TESSALON PERLES) 100 MG capsule Take 1 capsule by mouth 3 times daily as needed for Cough 4/13/22   Shanda Guzman, DO   apixaban (ELIQUIS) 5 MG TABS tablet Take 1 tablet by mouth 2 times daily 4/8/22   Jose Babcock MD   hydrocortisone (ANUSOL-HC) 2.5 % CREA rectal cream Apply on affected area twice daily 2/28/22   Daphnie Hernández MD        Medications:    Current Facility-Administered Medications: loperamide (IMODIUM) capsule 2 mg, 2 mg, Oral, TID PRN  melatonin tablet 10 mg, 10 mg, Oral, Once  famotidine (PEPCID) tablet 20 mg, 20 mg, Oral, Daily  amLODIPine (NORVASC) tablet 10 mg, 10 mg, Oral, Daily  metoprolol tartrate (LOPRESSOR) tablet 25 mg, 25 mg, Oral, BID  miconazole (MICOTIN) 2 % powder, , Topical, BID  sodium chloride flush 0.9 % injection 10 mL, 10 mL, IntraVENous, PRN  QUEtiapine (SEROQUEL) tablet 25 mg, 25 mg, Oral, BID  LORazepam (ATIVAN) tablet 1 mg, 1 mg, Oral, Q4H PRN  lacosamide (VIMPAT) tablet 100 mg, 100 mg, Oral, BID  levETIRAcetam (KEPPRA) tablet 1,000 mg, 1,000 mg, Oral, BID  docusate (COLACE) 50 MG/5ML liquid 100 mg, 100 mg, Oral, Daily  apixaban (ELIQUIS) tablet 5 mg, 5 mg, Oral, BID  lamoTRIgine (LAMICTAL) tablet 125 mg, 125 mg, Oral, BID  oxyCODONE-acetaminophen (PERCOCET) 5-325 MG per tablet 1 tablet, 1 tablet, Oral, Q4H PRN  sertraline (ZOLOFT) tablet 100 mg, 100 mg, Oral, Daily  melatonin tablet 5 mg, 5 mg, Oral, Nightly  morphine (PF) injection 2 mg, 2 mg, IntraVENous, Q4H PRN  albuterol (PROVENTIL) nebulizer solution 2.5 mg, 2.5 mg, Nebulization, Q6H PRN  racepinephrine HCl (VAPONEFPRIN) 2.25 % nebulizer solution NEBU 11.25 mg, 11.25 mg, Nebulization, Q3H PRN  sodium chloride nebulizer 0.9 % solution 3 mL, 3 mL, Nebulization, Q4H PRN  sodium chloride flush 0.9 % injection 5-40 mL, 5-40 mL, IntraVENous, 2 times per day  sodium chloride flush 0.9 % injection 5-40 mL, 5-40 mL, IntraVENous, PRN  0.9 % sodium chloride infusion, , IntraVENous, PRN  ondansetron (ZOFRAN-ODT) disintegrating tablet 4 mg, 4 mg, Oral, Q8H PRN **OR** ondansetron (ZOFRAN) injection 4 mg, 4 mg, IntraVENous, Q6H PRN  polyethylene glycol (GLYCOLAX) packet 17 g, 17 g, Oral, Daily PRN  acetaminophen (TYLENOL) tablet 650 mg, 650 mg, Oral, Q6H PRN **OR** acetaminophen (TYLENOL) suppository 650 mg, 650 mg, Rectal, Q6H PRN     Past Medical History:        Diagnosis Date    Anemia     Bleeding 10/2020    intra-abdominal bleeding -due to splenic mass with GI infiltration. Status post embolization    Cervical cancer (HCC)     Depression     Diabetes mellitus ([facility] [address]     GERD (gastroesophageal reflux disease)     Hx of blood clots     Hypertension     Metastatic cancer ([facility] [address] 10/2020    extensive intraabdominal and splenic involvement and lung mets. Ovarian cancer ([facility] [address]     low grade serous ovarian carcinoma    Post chemo evaluation     2007: Chemo via med onc (Dr. Dunia Jackson), 2008: Casey Patricio due to rising CA-125, 2013: intraperitoneal chemo,12/2015: Ca125 - 25     PRES (posterior reversible encephalopathy syndrome)     Splenic lesion        Past Surgical History:        Procedure Laterality Date    ABSCESS DRAINAGE  2013    Franca rectal    ANUS SURGERY      ANAL FISSURECTOMY    CARDIAC CATHETERIZATION      COLECTOMY  03/2013    ex lap, tumor debulking, transverse colectomy w reanastamosis, subgastric omentectomy, intraperitoneal port placement    HYSTERECTOMY, TOTAL ABDOMINAL (CERVIX REMOVED)      IR EMBOLIZATION HEMORRHAGE  10/05/2020    intra-abdominal bleeding -due to splenic mass with GI infiltration. Status post embolization [employer] interlock coils x7. mri condtional 3t ok, safe immediately post implant.     IR PORT PLACEMENT EQUAL OR GREATER THAN 5 YEARS  08/24/2020    IR PORT PLACEMENT EQUAL OR GREATER THAN 5 YEARS 8/24/2020 Sita Otero MD [facility]    PORT SURGERY      IP Port    TONSILLECTOMY         Allergies: Carboplatin and Ceftriaxone      Social History:      RESIDENCE: House, rent, lives with ex boyfriend  : twice , 3 fathers,     CHILDREN: 2/2 (boys, girls) Ebony Ramsey (lives in [place]), Osei (lives in [place])  OCCUPATION: pharmcy, currently on disability  EDUCATION: GED    SUBSTANCE USE HISTORY:   denies        Family Medical and Psychiatric History: Mother was an alcoholic        Problem Relation Age of Onset    Alcohol Abuse Mother     Cirrhosis Mother          Physical  /69   Pulse 80   Temp 98 °F (36.7 °C) (Oral)   Resp 16   Ht 5' 5\" (1.651 m)   Wt 151 lb 3.8 oz (68.6 kg)   SpO2 94%   BMI 25.17 kg/m²         Mental Status Examination:  Level of consciousness:  Within normal limits  Appearance: hospital attire, lying in bed, fair grooming  Behavior/Motor:  no abnormalities noted  Attitude toward examiner:  cooperative, attentive and good eye contact  Speech:  Spontaneous, normal rate and volume  Mood: Depressed  Affect: mood congruent  Thought processes:  Linear, goal directed, coherent  Thought content: Denies suicidal ideations   Denies homicidal ideations    Endorses auditory hallucinations described in HPI   Denies delusions  Cognition:  Oriented to self, situation, location, date  Concentration clinically adequate  Memory age appropriate  Insight & Judgment:  fair    DSM-5 DIAGNOSIS:      Recurrent severe major depression without psychosis  Rule out with psychosis  Likely auditory hallucinations secondary to epileptic activity        PLAN:      Will discontinue Seroquel and add olanzapine at bedtime  Continue with Zoloft  Recommend neurology management of seizure activity  Additional recommendations will follow the clinical course. Thank you very much for allowing us to participate in the care of this patient. Electronically signed by Pankaj Hernández MD on 10/13/22 at 3:45 PM EDT        Provider Location (City/State):   [facility]    This virtual visit was conducted via interactive/real-time audio/video.

## 2022-10-13 NOTE — PROGRESS NOTES
[facility]  Internal Medicine Teaching Residency Program  Inpatient Daily Progress Note  ______________________________________________________________________________    Patient: Emi Rojas [facility]  YOB: 1965   RADHA:[id number]    Acct: [de-identified]     Room: [facility]-  Admit date: 10/3/2022  Today's date: 10/13/22  Number of days in the hospital: 10    SUBJECTIVE   Admitting Diagnosis: Septicemia ([address]  CC: Altered mental status    Pt examined at bedside. Chart & results reviewed. No acute events overnight. Hemodynamically stable, afebrile maintaining saturation on room air. Sleep is improved and no active symptoms of headaches. No episodes of seizures. Ongoing auditory hallucinations. Hematology/oncology will continue chemotherapy outpatient. Neurology will continue on on the same antiseizure medications and follow outpatient in 4 to 6 weeks time. Psychiatry consulted for ongoing hallucinations. Patient refused to go to SNF and wanted home health aide services but her insurance is not covering the [address] that she was using previously on others have declined. ROS:  Constitutional:  negative for chills, fevers, sweats  Respiratory:  negative for cough, dyspnea on exertion, hemoptysis, shortness of breath, wheezing  Cardiovascular:  negative for chest pain, chest pressure/discomfort, lower extremity edema, palpitations  Gastrointestinal:  negative for abdominal pain, constipation, diarrhea, nausea, vomiting  Neurological:  negative for dizziness, headache  BRIEF HISTORY     78-year-old female presented with chief complaints of altered mental status and ED. Was found drowsy by daughter on 10/2/2022. She was brought in by EMS with altered mental status opening eyes to painful stimuli and low GCS. In the ER she was meeting SIRS criteria and decision was made to intubate the patient for airway protection.   Before intubation Narcan was also tried as there was a suspicion for acute toxic metabolic encephalopathy due to prescription opioid use. She was transferred to ICU for further management. In the ICU initially after intubation the patient was combative and not intolerant of the ET tube so she was sedated with propofol. Also started on IV fluids for ongoing hypotension. She has known history of seizures and was on antiseizure medications at home. CT scan chest showed bilateral pulmonary infiltrates concerning for aspiration pneumonia and she was started on broad-spectrum antibiotics. Neurology was also consulted for seizure work-up. 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 She has hx of intra-abdominal bleeding secondary to splenic mass with GI infiltration s/p embolization of the spleen. 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 They recommend stent placement if patient goes into complete lower bowel obstruction. During the ICU stay, the patient was on LTME which showed 6 subclinical seizures over 24 hours with left hand flapping and left head deviation during for electroclinical seizures. The patient was started with home dose of Keppra with loading dose 2 g and 1 mg Ativan for seizures and maintenance dose of Keppra increased to 1 g twice daily. OBJECTIVE     Vital Signs:  /64   Pulse 88   Temp 98.1 °F (36.7 °C) (Oral)   Resp 14   Ht 5' 5\" (1.651 m)   Wt 151 lb 3.8 oz (68.6 kg)   SpO2 92%   BMI 25.17 kg/m²     Temp (24hrs), Av.2 °F (36.8 °C), Min:98.1 °F (36.7 °C), Max:98.4 °F (36.9 °C)    In: -   Out: 150 [Urine:150]    Physical Exam:  Constitutional: This is a well developed, well nourished, 25-29.9 - Overweight 59y.o. year old female who is alert, oriented, cooperative and in no apparent distress.   Head:normocephalic and atraumatic. Respiratory: Breath sounds bilaterally were clear to auscultation. There were no wheezes, rhonchi or rales. There is no intercostal retraction or use of accessory muscles. Cardiovascular: Regular without murmur, clicks, gallops or rubs. Abdomen: Slightly rounded and soft without organomegaly. No rebound, rigidity or guarding was appreciated. Musculoskeletal:  No gross muscle weakness. Extremities:  No lower extremity edema, ulcerations, tenderness, varicosities or erythema. Muscle size, tone and strength are normal.  No involuntary movements are noted. Skin:  Warm and dry. Good color, turgor and pigmentation. No lesions or scars.   No cyanosis or clubbing  Neurological/Psychiatric: The patient's general behavior, level of consciousness, thought content and emotional status is normal.        Medications:  Scheduled Medications:    melatonin  10 mg Oral Once    famotidine  20 mg Oral Daily    amLODIPine  10 mg Oral Daily    metoprolol tartrate  25 mg Oral BID    miconazole   Topical BID    QUEtiapine  25 mg Oral BID    lacosamide  100 mg Oral BID    levETIRAcetam  1,000 mg Oral BID    docusate  100 mg Oral Daily    apixaban  5 mg Oral BID    lamoTRIgine  125 mg Oral BID    sertraline  100 mg Oral Daily    melatonin  5 mg Oral Nightly    sodium chloride flush  5-40 mL IntraVENous 2 times per day     Continuous Infusions:    sodium chloride Stopped (10/10/22 1232)     PRN Medicationssodium chloride flush, 10 mL, PRN  LORazepam, 1 mg, Q4H PRN  oxyCODONE-acetaminophen, 1 tablet, Q4H PRN  morphine, 2 mg, Q4H PRN  albuterol, 2.5 mg, Q6H PRN  racepinephrine HCl, 11.25 mg, Q3H PRN  sodium chloride nebulizer, 3 mL, Q4H PRN  sodium chloride flush, 5-40 mL, PRN  sodium chloride, , PRN  ondansetron, 4 mg, Q8H PRN   Or  ondansetron, 4 mg, Q6H PRN  polyethylene glycol, 17 g, Daily PRN  acetaminophen, 650 mg, Q6H PRN   Or  acetaminophen, 650 mg, Q6H PRN      Diagnostic Labs:  CBC:   Recent Labs 10/11/22  0457 10/12/22  0753 10/13/22  0513   WBC 9.4 9.5 9.1   RBC 3.64* 4.06 3.80*   HGB 11.3* 12.0 11.3*   HCT 33.3* 37.8 35.6*   MCV 91.5 93.1 93.7   RDW 17.7* 17.2* 17.3*   * 341 395       BMP:   Recent Labs     10/11/22  0457 10/12/22  0753 10/13/22  0513    141 139   K 3.7 3.3* 3.9    103 102   CO2 25 26 24   BUN 13 18 20   CREATININE 1.15* 1.10* 1.11*       BNP: No results for input(s): BNP in the last 72 hours. PT/INR: No results for input(s): PROTIME, INR in the last 72 hours. APTT: No results for input(s): APTT in the last 72 hours. CARDIAC ENZYMES: No results for input(s): CKMB, CKMBINDEX, TROPONINI in the last 72 hours. Invalid input(s): CKTOTAL;3  FASTING LIPID PANEL:  Lab Results   Component Value Date    CHOL 131 03/10/2021    HDL 33 (L) 03/10/2021    TRIG 147 03/10/2021     LIVER PROFILE: No results for input(s): AST, ALT, ALB, BILIDIR, BILITOT, ALKPHOS in the last 72 hours. MICROBIOLOGY:   Lab Results   Component Value Date/Time    CULTURE NO GROWTH 5 DAYS 10/03/2022 02:44 PM       Imaging:    XR CHEST PORTABLE    Result Date: 10/5/2022  Interval extubation. Interstitial opacities have increased in the interval, for which developing edema should be considered. MRI BRAIN W WO CONTRAST    Result Date: 10/9/2022  1. Overall, findings are similar compared to brain MRI done October 4, 2022 with redemonstration of patchy areas of FLAIR hyperintense signal in the left posterior frontal lobe and right greater than left occipital lobes which may represent small old infarcts. 2.  No acute stroke, intracranial hemorrhage or abnormal enhancement. 3.  Mild chronic small vessel ischemic disease. MRI BRAIN WO CONTRAST    Result Date: 10/4/2022  1. New subtle focus of T2/FLAIR hyperintensity in the lateral left frontal cortex and subcortical white matter is nonspecific and may reflect sequela of prior ischemia or mild posterior reversal encephalopathy syndrome.   Short interval follow-up is recommended. 2. No evidence of acute infarct or intracranial hemorrhage. 3. Stable small foci of right occipital encephalomalacia in keeping with sequela of prior insult. ASSESSMENT & PLAN     ASSESSMENT / PLAN:     Principal Problem:    Septicemia ([address]  Active Problems:    Malignant neoplasm of ovary (HCC)    Seizure ([address]    Acute encephalopathy    Seizure disorder, status epilepticus, nonconvulsive ([address]    Cancer, metastatic to bone ([address]    Chronic deep vein thrombosis (DVT) of femoral vein of left lower extremity (HCC)    Iron deficiency anemia secondary to inadequate dietary iron intake    AMS (altered mental status)    Gastroesophageal reflux disease    Metabolic encephalopathy    Breakthrough seizure ([address]  Resolved Problems:    Type 2 diabetes mellitus without complication, without long-term current use of insulin ([address]     Relapsed Ovarian carcinoma with metastasis  -Diagnosed 2005 with intraperitoneal carcinomatosis s/p surgical debulking and systemic chemotherapy. - Relapse in last relapse in 2014, lost to follow-up since  - Metastatic relapse again, biopsy confirmed ovarian cancer recurrence with intra-abdominal and splenic involvement as well as lung metastasis  - Currently on active chemotherapy last treatment on 09/29/2022, hematology/oncology continues to follow the patient. - Further cancer treatment outpatient with oncology. Acute metabolic encephalopathy secondary to seizures- resolved  - Possibly secondary to seizure-like activity  - Neurology consulted, LTME discontinued, suggests underlying structural defect and increased risk for focal onset seizures.     - Mentation improved  - Continue Seroquel 25 mg twice daily     Subclinical seizures- controlled  - Neurology consulted recommends Keppra 1 g twice daily, Vimpat 100 mg twice daily and Lamictal 125 mg twice daily.  - MRI brain shows no acute infarct or hemorrhage and stable small foci of right occipital encephalomalacia.  -Follow-up MRI brain is stable compared to prior image, no obvious evidence of PRES, no evidence of metastatic lesions to the brain.  - Seizures well-controlled last seizure 9/06  - Outpatient follow-up with neurology in 4 to 6 weeks. Aspiration pneumonia-resolved  - Possibly secondary to altered mentation and acute metabolic encephalopathy  -Completed course of levaquin  - Supplemental oxygen therapy as needed. - Continue nebulizer treatments and incentive spirometry  - Aspiration precautions     Primary hypertension  - Started on Norvasc 10 mg daily, blood pressure trolled  - Monitor blood pressure    History of DVT/PE  - Continue anticoagulation with Eliquis 5 mg twice daily    Acute diarrhea- resolved  - 2 episodes of loose stools yesterday, previously constipation  - Colace held yesterday, diarrhea resolved      DVT ppx : Lovenox  GI ppx: Pepcid    PT/OT: Consulted  Discharge Planning / SW:  consulted    Karolyn Evans MD  Internal Medicine Resident, PGY-1  [address] [place] [address];  [place], [place]  10/13/2022, 9:01 AM

## 2022-10-13 NOTE — PROGRESS NOTES
Neurology Nurse Practitioner Progress Note      INTERVAL HISTORY: This is a 59 y.o.  female admitted 10/3/2022 for acute onset altered mentation. This is a follow-up neurology progress note. The patient was examined and the chart was reviewed. Discussed with the RN. Patient reported on-going auditory hallucinations. She stays alert and oriented x 3. Denied any other symptoms. HPI: Shantel Holliday is a 59 y.o. 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 Daughter reported that on the morning of arrival they let her mother sleep in. When they went in to check on her later patient was difficult to arouse. Patient's other daughter saw the patient vomiting on her bed through camera and then she became incontinent and behaving in an erratic way. Seizures were witnessed. EMS were called. Last known well was 2300 the night prior. Patient has prior history of PRES 10/2020 with new onset seizure disorder during that time. Reportedly patient has had multiple hospitalization for breakthrough seizures with negative work-up and AED adjustments. Currently she has been taking Keppra 1 g twice daily and Lamictal 125 mg twice daily. Neurology was consulted on 10/4 for ongoing disorientation and agitation, after extubation on 10/4. Patient underwent MRI brain (10/4)-prior infarcts versus mild press? 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 patient was hooked up to LTME - total of 18 subclinical seizures along with mild to moderate encephalopathy (last seizure on 10/6/2022).   Patient was continued on Keppra 1 g twice daily and Lamictal 125 mg twice daily; Vimpat 100 mg twice daily was added to the regimen. She was started on Seroquel 25 mg twice daily due to insomnia and hallucinations. Over time patient was alert and oriented. We signed off on 10/11/2022. We were reconsulted on 10/13 due to ongoing hallucinations. melatonin  10 mg Oral Once    famotidine  20 mg Oral Daily    amLODIPine  10 mg Oral Daily    metoprolol tartrate  25 mg Oral BID    miconazole   Topical BID    QUEtiapine  25 mg Oral BID    lacosamide  100 mg Oral BID    levETIRAcetam  1,000 mg Oral BID    docusate  100 mg Oral Daily    apixaban  5 mg Oral BID    lamoTRIgine  125 mg Oral BID    sertraline  100 mg Oral Daily    melatonin  5 mg Oral Nightly    sodium chloride flush  5-40 mL IntraVENous 2 times per day       Past Medical History:   Diagnosis Date    Anemia     Bleeding 10/2020    intra-abdominal bleeding -due to splenic mass with GI infiltration. Status post embolization    Cervical cancer (HCC)     Depression     Diabetes mellitus ([facility]     GERD (gastroesophageal reflux disease)     Hx of blood clots     Hypertension     Metastatic cancer ([facility] 10/2020    extensive intraabdominal and splenic involvement and lung mets. Ovarian cancer ([facility]     low grade serous ovarian carcinoma    Post chemo evaluation     2007: Chemo via med onc (Dr. Richard Bowden), 2008: Erroll Creed due to rising CA-125, 2013: intraperitoneal chemo,12/2015: Ca125 - 25     PRES (posterior reversible encephalopathy syndrome)     Splenic lesion        Past Surgical History:   Procedure Laterality Date    ABSCESS DRAINAGE  2013    Franca rectal    ANUS SURGERY      ANAL FISSURECTOMY    CARDIAC CATHETERIZATION      COLECTOMY  03/2013    ex lap, tumor debulking, transverse colectomy w reanastamosis, subgastric omentectomy, intraperitoneal port placement    HYSTERECTOMY, TOTAL ABDOMINAL (CERVIX REMOVED)      IR EMBOLIZATION HEMORRHAGE  10/05/2020    intra-abdominal bleeding -due to splenic mass with GI infiltration.   Status post embolization Pathgather interlock coils x7. mri condtional 3t ok, safe immediately post implant. IR PORT PLACEMENT EQUAL OR GREATER THAN 5 YEARS  08/24/2020    IR PORT PLACEMENT EQUAL OR GREATER THAN 5 YEARS 8/24/2020 Jayden Rodriguez MD [facility]    PORT SURGERY      IP Port    TONSILLECTOMY         PHYSICAL EXAM:    Blood pressure 134/64, pulse 88, temperature 98.2 °F (36.8 °C), resp. rate 14, height 5' 5\" (1.651 m), weight 151 lb 3.8 oz (68.6 kg), SpO2 92 %. ROS: Auditory hallucinations        Neurological Examination:  Mental status   Patient reported on-going auditory hallucinations. No hallucinations noted at this time.  She was alert and oriented x 3; has been following simple commands   Cranial nerves Grossly intact   Motor function  Strength: Patient has been using all limbs purposefully and equally  Normal bulk and tone                Sensory function Grossly intact     Cerebellar No visible tremors   Reflex function Intact    Gait                  Not tested       DATA    Lab Results   Component Value Date    WBC 9.1 10/13/2022    RBC 3.80 (L) 10/13/2022    HGB 11.3 (L) 10/13/2022    HCT 35.6 (L) 10/13/2022     10/13/2022    ALT 9 10/03/2022    AST 18 10/03/2022     10/13/2022    K 3.9 10/13/2022    MG 2.1 10/12/2022    PHOS 4.0 10/08/2022     10/13/2022    AMMONIA 36 10/03/2022    CREATININE 1.11 (H) 10/13/2022    BUN 20 10/13/2022    CO2 24 10/13/2022    TSH 2.93 03/09/2021    INR 1.2 05/03/2022    [id number] 654 06/04/2022    FOLATE 8.7 06/04/2022    LABA1C 5.2 04/13/2022     Lab Results   Component Value Date    CHOL 131 03/10/2021     Lab Results   Component Value Date    TRIG 147 03/10/2021     Lab Results   Component Value Date    HDL 33 (L) 03/10/2021     Lab Results   Component Value Date    LDLCHOLESTEROL 69 03/10/2021     Lab Results   Component Value Date    VLDL NOT REPORTED 03/10/2021     Lab Results   Component Value Date    CHOLHDLRATIO 4.0 03/10/2021         DIAGNOSTIC DATA:  CT HEAD (10/3/2022): No acute intracranial abnormality     MRI BRAIN (10/4/2022):   1. New subtle focus of T2/FLAIR hyperintensity in lateral L frontal cortex & subcortical white matter    is nonspecific and may reflect sequela of prior infarct or mild PRES. 2. Stable small foci of R occipital encephalomalacia; sequela of prior insult. MRI BRAIN W/WO (10/9/2022):   Redemonstration of patchy FLAIR hyperintense signal in L posterior frontal lobe & R>L occipital    lobes, which may represent small old infarcts. CTA HEAD & NECK (10/3/2022): No LVO      ECHO (2/22/2022): EF 55%      LTME (10/4 - 10/9/2022): Day 1: During the recording, the patient had 1 subclinical seizure lasting about 40 seconds originating from L hemisphere. The interictal EEG was abnormal due to diffuse polymorphic theta slowing suggesting mild to moderate encephalopathy. Continuous L hemispheric slowing suggested underlying structural defect. frequent L central parietal sharp waves and lateralized periodic discharges conferred an increased risk for focal onset seizures    Day 2: During the recording, the patient had 11 electroclinical seizures with L hand flapping & L head deviation. Electrographically, these seizures were similar to yesterday seizures. The interictal EEG was abnormal due to diffuse polymorphic theta slowing suggesting mild to moderate encephalopathy. Continuous L hemispheric slowing suggested underlying structural defect. Frequent L central parietal sharp waves & lateralized periodic discharges (LPDs) conferred an increased risk for focal onset seizures    Day 3: During the recording, the patient had 6 L hemispheric onset seizures. Electrographically, these seizures were similar to yesterday seizures. The interictal EEG was abnormal due to diffuse polymorphic theta slowing suggesting mild to moderate encephalopathy. Continuous L hemispheric slowing suggested underlying structural defect.  Frequent L central parietal sharp waves & lateralized periodic discharges (LPDs) conferred an increased risk for focal onset seizures    Day 4 & 5: During this day of recording no events were recorded. Continuous L hemispheric slowing suggested underlying structural defect. Occasional L central parietal sharp waves & lateralized periodic discharges (LPDs) conferred an increased risk for focal onset seizures    Day 6: During this day of recording no events were recorded. Continuous L hemispheric slowing suggested underlying structural defect. Previously seen L sided LPDs were not seen on today's recording                  IMPRESSION: 59 y.o.  female admitted with  Acute metabolic encephalopathy in the setting of HTN urgency (150/122 mm Hg), febrile illness with +SIRS criteria, aspiration pneumonia & subclinical status. 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 Heme/Onc on bord    Comorbid conditions - HTN, DM, GERD, depression            PLAN:  Will increase the dose of Vimpat 150 mg BID PO (previously 100 mg BID) & decrease the dose of Keppra 750 mg BID PO (previously 1 g BID) & Lamictal 125 mg BID    Continue Seroquel 25 mg BID    Continue PT/OT; he ambulated 180' with RW    We will follow    Please note that this note was generated using a voice recognition dictation software.  Although every effort was made to ensure the accuracy of this automated transcription, some errors in transcription may have occurred.

## 2022-10-13 NOTE — PROGRESS NOTES
Comprehensive Nutrition Assessment    Type and Reason for Visit:  Reassess    Nutrition Recommendations/Plan:   Continue current diet with chocolate Ensure oral supplements with all meals. Encourage/monitor PO intakes as tolerated. Will monitor labs, weights, and plan of care. Malnutrition Assessment:  Malnutrition Status:  No malnutrition (10/08/22 1324)    Context:  Acute Illness       Nutrition Assessment:    Pt reports doing okay with meals. This morning pt ate more than 50% of her breakfast and has been drinking some of the chocolate Ensure shakes. Recorded PO intakes of 25-75% per chart review. Labs/Meds reviewed. Nutrition Related Findings:    Labs/Meds reviewed. Last BM 10/13. Wound Type: None       Current Nutrition Intake & Therapies:    Average Meal Intake: 26-50%, 51-75%  Average Supplements Intake: 26-50%, 51-75%  ADULT DIET; Regular  ADULT ORAL NUTRITION SUPPLEMENT; Breakfast, Lunch, Dinner; Standard High Calorie/High Protein Oral Supplement    Anthropometric Measures:  Height: 5' 5\" (165.1 cm)  Ideal Body Weight (IBW): 125 lbs (57 kg)    Admission Body Weight: 151 lb (68.5 kg)  Current Body Weight: 151 lb 3.8 oz (68.6 kg), 121 % IBW. Weight Source: Bed Scale  Current BMI (kg/m2): 25.2                          BMI Categories: Overweight (BMI 25.0-29. 9)    Estimated Daily Nutrient Needs:  Energy Requirements Based On: Kcal/kg  Weight Used for Energy Requirements: Current  Energy (kcal/day): 0137-5923 kcal  Weight Used for Protein Requirements: Current  Protein (g/day): 75-80g  Method Used for Fluid Requirements: 1 ml/kcal  Fluid (ml/day): 1700 mL or per MD    Nutrition Diagnosis:   Inadequate oral intake related to  (dislike of hospital food) as evidenced by intake 26-50%, intake 51-75% (variable PO intakes; need for ONS)    Nutrition Interventions:   Food and/or Nutrient Delivery: Continue Current Diet, Continue Oral Nutrition Supplement  Nutrition Education/Counseling: No recommendation at this time  Coordination of Nutrition Care: Continue to monitor while inpatient  Plan of Care discussed with: Patient    Goals:  Previous Goal Met: Progressing toward Goal(s)  Goals: PO intake 75% or greater       Nutrition Monitoring and Evaluation:   Behavioral-Environmental Outcomes: None Identified  Food/Nutrient Intake Outcomes: Food and Nutrient Intake, Supplement Intake  Physical Signs/Symptoms Outcomes: Biochemical Data, GI Status, Nutrition Focused Physical Findings, Skin, Weight    Discharge Planning:     Too soon to determine     Clifton Mario, [address], [place]  Contact: [phone]

## 2022-10-13 NOTE — PLAN OF CARE
Problem: Respiratory - Adult  Goal: Achieves optimal ventilation and oxygenation  10/13/2022 0517 by Sandee Guerra RN  Outcome: Progressing  10/13/2022 0517 by Sandee Guerra RN  Outcome: Progressing     Problem: Discharge Planning  Goal: Discharge to home or other facility with appropriate resources  10/13/2022 0517 by Sandee Guerra RN  Outcome: Progressing  10/13/2022 0517 by Sandee Guerra RN  Outcome: Progressing     Problem: Safety - Adult  Goal: Free from fall injury  10/13/2022 0517 by Sandee Guerra RN  Outcome: Progressing  10/13/2022 0517 by Sandee Guerra RN  Outcome: Progressing     Problem: ABCDS Injury Assessment  Goal: Absence of physical injury  10/13/2022 0517 by Sandee Guerra RN  Outcome: Progressing  10/13/2022 0517 by Sandee Guerra RN  Outcome: Progressing     Problem: Skin/Tissue Integrity  Goal: Absence of new skin breakdown  Description: 1. Monitor for areas of redness and/or skin breakdown  2. Assess vascular access sites hourly  3. Every 4-6 hours minimum:  Change oxygen saturation probe site  4. Every 4-6 hours:  If on nasal continuous positive airway pressure, respiratory therapy assess nares and determine need for appliance change or resting period.   10/13/2022 0517 by Sandee Guerra RN  Outcome: Progressing  10/13/2022 0517 by Sandee Guerra RN  Outcome: Progressing     Problem: Pain  Goal: Verbalizes/displays adequate comfort level or baseline comfort level  10/13/2022 0517 by Sandee Guerra RN  Outcome: Progressing  10/13/2022 0517 by Sandee Guerra RN  Outcome: Progressing     Problem: Chronic Conditions and Co-morbidities  Goal: Patient's chronic conditions and co-morbidity symptoms are monitored and maintained or improved  10/13/2022 0517 by Sandee Guerra RN  Outcome: Progressing  10/13/2022 0517 by Sandee Guerra RN  Outcome: Progressing

## 2022-10-13 NOTE — PROGRESS NOTES
Physical Therapy  Facility/Department: [facility] 1C STEPDOWN  Daily Treatment note . Name: [facility]  : 1965  MRN: [id number]  Date of Service: 10/13/2022    Discharge Recommendations:  Patient would benefit from continued therapy after discharge   PT Equipment Recommendations  Equipment Needed: Yes  Mobility Devices: Omega Slim: Rolling      Patient Diagnosis(es): The primary encounter diagnosis was Septicemia ([address] Diagnoses of Altered mental status, unspecified altered mental status type, Metabolic encephalopathy, Seizure ([address] Seizure disorder ([address] Acute respiratory failure with hypoxia ([address] and Breakthrough seizure ([address] were also pertinent to this visit. Past Medical History:  has a past medical history of Anemia, Bleeding, Cervical cancer ([address] Depression, Diabetes mellitus ([address] GERD (gastroesophageal reflux disease), Hx of blood clots, Hypertension, Metastatic cancer ([address] Ovarian cancer ([address] Post chemo evaluation, PRES (posterior reversible encephalopathy syndrome), and Splenic lesion. Past Surgical History:  has a past surgical history that includes Hysterectomy, total abdominal; Port Surgery; Tonsillectomy; IR PORT PLACEMENT > 5 YEARS (2020); Anus surgery; Abscess Drainage (); colectomy (2013); IR EMBOLIZATION HEMORRHAGE (10/05/2020); and Cardiac catheterization. Assessment   Body Structures, Functions, Activity Limitations Requiring Skilled Therapeutic Intervention: Decreased functional mobility ; Decreased strength;Decreased cognition;Decreased safe awareness;Decreased endurance;Decreased balance  Assessment: Pt amb 90ft x2 RW with CG A, Pt continue to be impulsive and unsafe amb without assist. . Pt has no insight to deficits making her a high fall risk. Recomending continued therapy to address deficits. Pt will require 24hrs supervision and assistance with mobility upon discahrge.   Therapy Prognosis: Good  Requires PT Follow-Up: Yes  Activity Tolerance  Activity Tolerance: Patient tolerated treatment well;Patient limited by fatigue     Plan   Physcial Therapy Plan  General Plan:  (5-6x/wk)  Current Treatment Recommendations: Strengthening, Balance training, Functional mobility training, Transfer training, Patient/Caregiver education & training, Safety education & training, Home exercise program, Equipment evaluation, education, & procurement, Therapeutic activities, Endurance training, Gait training, Stair training  Safety Devices  Type of Devices: Gait belt, Left in bed, Call light within reach, Bed alarm in place, Patient at risk for falls, Nurse notified  Restraints  Restraints Initially in Place: No  Restraints: 4 bed rails d/t seizure precautions     Restrictions  Restrictions/Precautions  Restrictions/Precautions: General Precautions, Up as Tolerated, Fall Risk  Required Braces or Orthoses?: No  Position Activity Restriction  Other position/activity restrictions: LTME, extubated 10/4     Subjective   General  Response To Previous Treatment: Patient with no complaints from previous session. Family / Caregiver Present: Yes  Follows Commands: Within Functional Limits  Subjective  Subjective: Pt supine in bed and agreeable to therapy, Pt denies pain at rest.requested assit to use rest room rior to ambulation. Cognition   Orientation  Overall Orientation Status: Within Normal Limits  Orientation Level: Oriented to time;Oriented to place;Oriented to situation;Oriented to person  Cognition  Overall Cognitive Status: Exceptions  Arousal/Alertness: Appropriate responses to stimuli  Following Commands: Follows one step commands with increased time; Follows one step commands with repetition  Attention Span: Attends with cues to redirect  Memory: Decreased recall of biographical Information;Decreased recall of recent events  Safety Judgement: Decreased awareness of need for assistance;Decreased awareness of need for safety  Problem Solving: Decreased awareness of errors;Assistance required to identify errors made;Assistance required to generate solutions  Insights: Decreased awareness of deficits  Initiation: Requires cues for some  Sequencing: Requires cues for some     Objective     Bed mobility  Supine to Sit: Contact guard assistance  Sit to Supine: Contact guard assistance  Scooting: Stand by assistance  Transfers  Sit to Stand: Contact guard assistance  Stand to Sit: Contact guard assistance  Bed to Chair: Contact guard assistance (commode)  Comment: Transfer with RW. Ambulation  Surface: Level tile  Device: Rolling Walker  Assistance: Contact guard assistance  Quality of Gait: unsteady, c/o B:e feeling weak this date  Gait Deviations: Slow Cynthia;Decreased step length  Distance: 90ft x2  Comments: demo decrease safety awareness t/o unsafe to amb without asssist .  More Ambulation?: No     Balance  Posture: Fair  Sitting - Static: Fair;+  Sitting - Dynamic: Fair  Standing - Static: Fair;-  Standing - Dynamic: Fair;-  Exercise Treatment: Ankle pumps, heel slide, short arc quads, straight leg raises. Reps  x10    AM-PAC Score  AM-PAC Inpatient Mobility Raw Score : 18 (10/08/22 1217)  AM-PAC Inpatient T-Scale Score : 43.63 (10/08/22 1217)  Mobility Inpatient CMS 0-100% Score: 46.58 (10/08/22 1217)  Mobility Inpatient CMS G-Code Modifier : CK (10/08/22 1217)     Goals  Short Term Goals  Time Frame for Short Term Goals: 14 visits  Short Term Goal 1: Pt will perform sit<>stand transfer with SBA. Short Term Goal 2: Pt will ambulate 125 feet with least restrictive device and SBA to increase functional independence. Short Term Goal 3: Pt will demonstrate fair+ standing balance to decrease fall risk. Short Term Goal 4: Pt will tolerate a 35 minute therapy session to promote increased endurance. Education  Patient Education  Education Given To: Patient  Education Provided: Role of Therapy;Transfer Training;Equipment;Plan of Care; Fall Prevention Strategies  Education Method: Verbal  Barriers to Learning: Cognition  Education Outcome: Continued education needed;Demonstrated understanding      Therapy Time   Individual Concurrent Group Co-treatment   Time In 1817         Time Out 1150         Minutes 26         Timed Code Treatment Minutes: [address], [facility]

## 2022-10-14 PROBLEM — R44.3 HALLUCINATIONS: Status: ACTIVE | Noted: 2022-10-14

## 2022-10-14 LAB
ABSOLUTE EOS #: 0.31 K/UL (ref 0–0.44)
ABSOLUTE IMMATURE GRANULOCYTE: 0.04 K/UL (ref 0–0.3)
ABSOLUTE LYMPH #: 1.15 K/UL (ref 1.1–3.7)
ABSOLUTE MONO #: 1 K/UL (ref 0.1–1.2)
ANION GAP SERPL CALCULATED.3IONS-SCNC: 14 MMOL/L (ref 9–17)
BASOPHILS # BLD: 1 % (ref 0–2)
BASOPHILS ABSOLUTE: 0.04 K/UL (ref 0–0.2)
BUN BLDV-MCNC: 23 MG/DL (ref 6–20)
CALCIUM SERPL-MCNC: 8.9 MG/DL (ref 8.6–10.4)
CHLORIDE BLD-SCNC: 101 MMOL/L (ref 98–107)
CO2: 23 MMOL/L (ref 20–31)
CREAT SERPL-MCNC: 1.14 MG/DL (ref 0.5–0.9)
EOSINOPHILS RELATIVE PERCENT: 4 % (ref 1–4)
GFR SERPL CREATININE-BSD FRML MDRD: 55 ML/MIN/1.73M2
GLUCOSE BLD-MCNC: 108 MG/DL (ref 70–99)
HCT VFR BLD CALC: 35.1 % (ref 36.3–47.1)
HEMOGLOBIN: 11.4 G/DL (ref 11.9–15.1)
IMMATURE GRANULOCYTES: 1 %
LYMPHOCYTES # BLD: 14 % (ref 24–43)
MCH RBC QN AUTO: 30.2 PG (ref 25.2–33.5)
MCHC RBC AUTO-ENTMCNC: 32.5 G/DL (ref 28.4–34.8)
MCV RBC AUTO: 92.9 FL (ref 82.6–102.9)
MONOCYTES # BLD: 12 % (ref 3–12)
NRBC AUTOMATED: 0 PER 100 WBC
PDW BLD-RTO: 17.2 % (ref 11.8–14.4)
PLATELET # BLD: 477 K/UL (ref 138–453)
PMV BLD AUTO: 9.4 FL (ref 8.1–13.5)
POTASSIUM SERPL-SCNC: 3.7 MMOL/L (ref 3.7–5.3)
RBC # BLD: 3.78 M/UL (ref 3.95–5.11)
RBC # BLD: ABNORMAL 10*6/UL
SEG NEUTROPHILS: 68 % (ref 36–65)
SEGMENTED NEUTROPHILS ABSOLUTE COUNT: 5.82 K/UL (ref 1.5–8.1)
SODIUM BLD-SCNC: 138 MMOL/L (ref 135–144)
WBC # BLD: 8.4 K/UL (ref 3.5–11.3)

## 2022-10-14 PROCEDURE — 97116 GAIT TRAINING THERAPY: CPT

## 2022-10-14 PROCEDURE — 6370000000 HC RX 637 (ALT 250 FOR IP): Performed by: NURSE PRACTITIONER

## 2022-10-14 PROCEDURE — 80048 BASIC METABOLIC PNL TOTAL CA: CPT

## 2022-10-14 PROCEDURE — 6370000000 HC RX 637 (ALT 250 FOR IP)

## 2022-10-14 PROCEDURE — 99232 SBSQ HOSP IP/OBS MODERATE 35: CPT | Performed by: PSYCHIATRY & NEUROLOGY

## 2022-10-14 PROCEDURE — 6370000000 HC RX 637 (ALT 250 FOR IP): Performed by: STUDENT IN AN ORGANIZED HEALTH CARE EDUCATION/TRAINING PROGRAM

## 2022-10-14 PROCEDURE — 2060000000 HC ICU INTERMEDIATE R&B

## 2022-10-14 PROCEDURE — 97110 THERAPEUTIC EXERCISES: CPT

## 2022-10-14 PROCEDURE — APPSS30 APP SPLIT SHARED TIME 16-30 MINUTES: Performed by: NURSE PRACTITIONER

## 2022-10-14 PROCEDURE — 36415 COLL VENOUS BLD VENIPUNCTURE: CPT

## 2022-10-14 PROCEDURE — 2580000003 HC RX 258: Performed by: STUDENT IN AN ORGANIZED HEALTH CARE EDUCATION/TRAINING PROGRAM

## 2022-10-14 PROCEDURE — 6370000000 HC RX 637 (ALT 250 FOR IP): Performed by: PSYCHIATRY & NEUROLOGY

## 2022-10-14 PROCEDURE — 85025 COMPLETE CBC W/AUTO DIFF WBC: CPT

## 2022-10-14 PROCEDURE — 97535 SELF CARE MNGMENT TRAINING: CPT

## 2022-10-14 PROCEDURE — 99232 SBSQ HOSP IP/OBS MODERATE 35: CPT | Performed by: INTERNAL MEDICINE

## 2022-10-14 RX ORDER — POTASSIUM CHLORIDE 20 MEQ/1
40 TABLET, EXTENDED RELEASE ORAL ONCE
Status: COMPLETED | OUTPATIENT
Start: 2022-10-14 | End: 2022-10-14

## 2022-10-14 RX ORDER — LACOSAMIDE 150 MG/1
150 TABLET ORAL 2 TIMES DAILY
Qty: 60 TABLET | Refills: 3 | Status: ON HOLD | OUTPATIENT
Start: 2022-10-14 | End: 2022-11-01 | Stop reason: SDUPTHER

## 2022-10-14 RX ORDER — LEVETIRACETAM 750 MG/1
750 TABLET ORAL 2 TIMES DAILY
Qty: 60 TABLET | Refills: 3 | Status: SHIPPED | OUTPATIENT
Start: 2022-10-14

## 2022-10-14 RX ADMIN — OLANZAPINE 5 MG: 5 TABLET, FILM COATED ORAL at 20:50

## 2022-10-14 RX ADMIN — LAMOTRIGINE 125 MG: 100 TABLET ORAL at 20:50

## 2022-10-14 RX ADMIN — OXYCODONE HYDROCHLORIDE AND ACETAMINOPHEN 1 TABLET: 5; 325 TABLET ORAL at 20:50

## 2022-10-14 RX ADMIN — LAMOTRIGINE 125 MG: 100 TABLET ORAL at 08:26

## 2022-10-14 RX ADMIN — OXYCODONE HYDROCHLORIDE AND ACETAMINOPHEN 1 TABLET: 5; 325 TABLET ORAL at 11:58

## 2022-10-14 RX ADMIN — OXYCODONE HYDROCHLORIDE AND ACETAMINOPHEN 1 TABLET: 5; 325 TABLET ORAL at 02:25

## 2022-10-14 RX ADMIN — ANTI-FUNGAL POWDER MICONAZOLE NITRATE TALC FREE: 1.42 POWDER TOPICAL at 08:30

## 2022-10-14 RX ADMIN — APIXABAN 5 MG: 5 TABLET, FILM COATED ORAL at 21:08

## 2022-10-14 RX ADMIN — LOPERAMIDE HYDROCHLORIDE 2 MG: 2 CAPSULE ORAL at 16:25

## 2022-10-14 RX ADMIN — SODIUM CHLORIDE, PRESERVATIVE FREE 10 ML: 5 INJECTION INTRAVENOUS at 20:51

## 2022-10-14 RX ADMIN — AMLODIPINE BESYLATE 10 MG: 10 TABLET ORAL at 08:26

## 2022-10-14 RX ADMIN — FAMOTIDINE 20 MG: 20 TABLET, FILM COATED ORAL at 08:26

## 2022-10-14 RX ADMIN — SODIUM CHLORIDE, PRESERVATIVE FREE 10 ML: 5 INJECTION INTRAVENOUS at 08:31

## 2022-10-14 RX ADMIN — SERTRALINE 100 MG: 50 TABLET, FILM COATED ORAL at 08:25

## 2022-10-14 RX ADMIN — SODIUM CHLORIDE, PRESERVATIVE FREE 10 ML: 5 INJECTION INTRAVENOUS at 08:32

## 2022-10-14 RX ADMIN — POTASSIUM CHLORIDE 40 MEQ: 20 TABLET, EXTENDED RELEASE ORAL at 12:56

## 2022-10-14 RX ADMIN — LACOSAMIDE 150 MG: 100 TABLET, FILM COATED ORAL at 21:09

## 2022-10-14 RX ADMIN — OXYCODONE HYDROCHLORIDE AND ACETAMINOPHEN 1 TABLET: 5; 325 TABLET ORAL at 16:24

## 2022-10-14 RX ADMIN — ANTI-FUNGAL POWDER MICONAZOLE NITRATE TALC FREE: 1.42 POWDER TOPICAL at 20:52

## 2022-10-14 RX ADMIN — LORAZEPAM 1 MG: 1 TABLET ORAL at 20:50

## 2022-10-14 RX ADMIN — LEVETIRACETAM 750 MG: 500 TABLET, FILM COATED ORAL at 08:24

## 2022-10-14 RX ADMIN — LEVETIRACETAM 750 MG: 500 TABLET, FILM COATED ORAL at 21:09

## 2022-10-14 RX ADMIN — METOPROLOL TARTRATE 25 MG: 25 TABLET ORAL at 08:26

## 2022-10-14 RX ADMIN — Medication 5 MG: at 20:50

## 2022-10-14 RX ADMIN — APIXABAN 5 MG: 5 TABLET, FILM COATED ORAL at 08:30

## 2022-10-14 RX ADMIN — METOPROLOL TARTRATE 25 MG: 25 TABLET ORAL at 20:49

## 2022-10-14 RX ADMIN — LORAZEPAM 1 MG: 1 TABLET ORAL at 13:05

## 2022-10-14 RX ADMIN — LACOSAMIDE 150 MG: 100 TABLET, FILM COATED ORAL at 08:31

## 2022-10-14 ASSESSMENT — PAIN DESCRIPTION - LOCATION
LOCATION: ABDOMEN
LOCATION: BACK
LOCATION: BACK

## 2022-10-14 ASSESSMENT — PAIN SCALES - GENERAL
PAINLEVEL_OUTOF10: 7
PAINLEVEL_OUTOF10: 0
PAINLEVEL_OUTOF10: 7
PAINLEVEL_OUTOF10: 2
PAINLEVEL_OUTOF10: 7
PAINLEVEL_OUTOF10: 8
PAINLEVEL_OUTOF10: 2

## 2022-10-14 ASSESSMENT — PAIN - FUNCTIONAL ASSESSMENT: PAIN_FUNCTIONAL_ASSESSMENT: PREVENTS OR INTERFERES SOME ACTIVE ACTIVITIES AND ADLS

## 2022-10-14 ASSESSMENT — PAIN DESCRIPTION - DESCRIPTORS: DESCRIPTORS: ACHING

## 2022-10-14 ASSESSMENT — PAIN SCALES - WONG BAKER: WONGBAKER_NUMERICALRESPONSE: 0

## 2022-10-14 NOTE — PROGRESS NOTES
SPIRITUAL CARE DEPARTMENT - [facility]  PROGRESS NOTE    Shift date: 10/14/2022   Shift day: Friday   Shift # 1    Room # 0140/0140-01   Name: Gwen Fink                [facility]: [address] [facility]:     Referral: Routine Visit    Admit Date & Time: 10/3/2022  1:07 PM    Assessment:  Gwen Fink is a 59 y.o. female. Upon entering the room writer observes pt and daughter, Carlyle Norman, present. Pt engaged well in conversation and sometimes asked dtr to help explain things. Dtr is from [facility] and was working on her computer, remaining quiet during visit. Both admitted that they have been through a lot with the death of their son/brother. Pt became tearful at times. Intervention:  Writer introduced self and title as  Writer offered space for patient  to express feelings, needs, and concerns and provided a ministry presence.  assured them of continued support as needed. Outcome:  Pt thanked  for visit. Plan:  Chaplains will remain available to offer spiritual and emotional support as needed. Electronically signed by Deena West, on 10/14/2022 at 1:20 PM.  [facility]  [phone]        10/14/22 1319   Encounter Summary   Service Provided For: Patient and family together   Referral/Consult From: [address] [place] [address]   Last Encounter  10/14/22   Complexity of Encounter Moderate   Begin Time 1305   End Time  1320   Total Time Calculated 15 min   Encounter    Type Follow up   Assessment/Intervention/Outcome   Assessment Calm;Coping;Tearful   Intervention Active listening;Discussed illness injury and its impact; Explored/Affirmed feelings, thoughts, concerns;Prayer (assurance of)/[place]   Outcome Engaged in conversation;Expressed feelings, needs, and concerns;Expressed Gratitude;Receptive

## 2022-10-14 NOTE — PROGRESS NOTES
Occupational Therapy  Facility/Department: [address] STEPDOWN  Occupational Therapy Daily Treatment Note    Name: Kevin Mccall  : 1965  MRN: [id number]  Date of Service: 10/14/2022    Discharge Recommendations:  Patient would benefit from continued therapy after discharge     Patient Diagnosis(es): The primary encounter diagnosis was Septicemia [facility]). Diagnoses of Altered mental status, unspecified altered mental status type, Metabolic encephalopathy, Seizure ([facility] [address] Seizure disorder ([facility] [address] Acute respiratory failure with hypoxia ([facility] [address] and Breakthrough seizure ([facility] [address] were also pertinent to this visit. Treatment Diagnosis: Septicemia      Assessment   Performance deficits / Impairments: Decreased functional mobility ; Decreased ADL status; Decreased high-level IADLs;Decreased safe awareness;Decreased balance;Decreased cognition;Decreased coordination  Assessment: Pt would benefit from continued acute care and post acute care OT to address above listed deficits.   Treatment Diagnosis: Septicemia  Prognosis: Fair;Good  Activity Tolerance  Activity Tolerance: Patient Tolerated treatment well  Activity Tolerance Comments: Occasional short rest breaks        Plan   Occupational Therapy Plan  Times Per Week: 2-3x/wk  Current Treatment Recommendations: Balance training, Functional mobility training, Endurance training, Safety education & training, Pain management, Cognitive reorientation, Patient/Caregiver education & training, Self-Care / ADL, Equipment evaluation, education, & procurement, Cognitive/Perceptual training     Restrictions  Restrictions/Precautions  Restrictions/Precautions: General Precautions, Up as Tolerated, Fall Risk  Required Braces or Orthoses?: No  Position Activity Restriction  Other position/activity restrictions: LTME, extubated 10/4    Subjective   General  Patient assessed for rehabilitation services?: Yes  Response to previous treatment: Patient with no complaints from previous session  Family / Caregiver Present: Yes (Dtr present for 3/4 of session)  Subjective  Subjective: RN ok'd patient for OT treatment this afternoon. Pt supine in bed upon NEFF arrival. Pt agreeable to session and denied pain       Objective   Safety Devices  Type of Devices: Gait belt;Left in bed;Call light within reach; Bed alarm in place; Patient at risk for falls;Nurse notified  Restraints  Restraints Initially in Place: No  Balance  Sitting: Without support (EOB and toilet ~10 minutes total throughout session and in chair upright 30 minutes)  Standing: Without support (Stood at tray table unsupported ~2 minutes and at sink unsupported x5 minutes SBA for safety and cues)  Gait  Overall Level of Assistance: Stand-by assistance  Interventions: Safety awareness training;Verbal cues  Distance (ft):  (Room distances without device short distance)  Toilet Transfers  Toilet - Technique: Ambulating  Equipment Used: Standard toilet (grab bar)  Toilet Transfer: Stand by assistance  Toilet Transfers Comments: Min verbal cues for slower pace to increase safety and decrease fall risk     ADL  Grooming: Stand by assistance;Setup;Verbal cueing; Increased time to complete  Grooming Skilled Clinical Factors: Stood at sink for oral care and wash hands  UE Bathing: Setup;Supervision  LE Bathing: Verbal cueing;Setup; Increased time to complete;Stand by assistance  LE Bathing Skilled Clinical Factors: Stood to wash alessandro area with no LOB  UE Dressing: Stand by assistance;Setup;Verbal cueing  LE Dressing: Verbal cueing;Setup; Increased time to complete;Stand by assistance  LE Dressing Skilled Clinical Factors: don slipper socks  Toileting: Increased time to complete;Setup;Stand by assistance  Toileting Skilled Clinical Factors: SBA for clothing mgt safety and no LOB demonstrated.  Pt required verbal cues for slower pacing with transfer  Additional Comments: Mildly decreased safety awareness and impulsiveness with min cues to correct throughout tasks. Pt completes 50% in standing and 50% seated        Bed mobility  Supine to Sit: Stand by assistance  Sit to Supine: Stand by assistance  Scooting: Stand by assistance  Bed Mobility Comments: Pt remains with some impulsive movement and requires occasional cues  Transfers  Sit to stand: Stand by assistance  Stand to sit: Stand by assistance  Transfer Comments: No device and cues for slower pace     Cognition  Overall Cognitive Status: Exceptions  Arousal/Alertness: Appropriate responses to stimuli  Following Commands: Follows one step commands consistently; Follows multistep commands with increased time; Follows multistep commands with repitition  Attention Span: Attends with cues to redirect  Safety Judgement: Decreased awareness of need for assistance;Decreased awareness of need for safety  Problem Solving: Decreased awareness of errors;Assistance required to identify errors made;Assistance required to generate solutions  Insights: Decreased awareness of deficits  Initiation: Requires cues for some  Sequencing: Requires cues for some  Orientation  Overall Orientation Status: Within Normal Limits  Orientation Level: Oriented to time;Oriented to place;Oriented to situation;Oriented to person       Education Given To: Patient  Education Provided: Role of Therapy;Transfer Training; Fall Prevention Strategies; ADL Adaptive Strategies  Education Provided Comments: Bed mobility, safety and body mechanics to decrease fall risks  Education Method: Verbal;Demonstration  Barriers to Learning: Cognition  Education Outcome: Verbalized understanding;Demonstrated understanding;Continued education needed    AM-PAC Score  AM-[facility] Inpatient Daily Activity Raw Score: 22 (10/14/22 1437)  AM-PAC Inpatient ADL T-Scale Score : 47.1 (10/14/22 1437)  ADL Inpatient CMS 0-100% Score: 25.8 (10/14/22 1437)  ADL Inpatient CMS G-Code Modifier : CJ (10/14/22 1437)      Goals  Short Term Goals  Time Frame for Short Term Goals:  By discharge pt will:  Short Term Goal 1: Demo bed mobility sit<>supine with SBA and 1 VC  Short Term Goal 2: Demo functional sit<>stand transfers and functional mobility with SBA, LRD PRN and 0 VCs  Short Term Goal 3: Demo UB ADLs with SBA, setup and no more than 2 VCs each task  Short Term Goal 4: Demo LB ADLs with CBA, setup and no more than 2 VCs each task  Short Term Goal 5: Maintain attention to functional/meaningful taskf or +3 minutes with no more than 2 VCs for redirection to address cognition       Therapy Time   Individual Concurrent Group Co-treatment   Time In 1330         Time Out 1423         Minutes 53         Timed Code Treatment Minutes: [address], NEFF/L

## 2022-10-14 NOTE — PROGRESS NOTES
[facility]  Internal Medicine Teaching Residency Program  Inpatient Daily Progress Note  ______________________________________________________________________________    Patient: Emi Gautam  YOB: 1965   SNT:[id number]    Acct: [de-identified]     Room: [facility]0/0140-01  Admit date: 10/3/2022  Today's date: 10/14/22  Number of days in the hospital: 11    SUBJECTIVE   Admitting Diagnosis: Septicemia ([facility] [address]  CC: Altered mental status    Pt examined at bedside. Chart & results reviewed. No acute events overnight. Hemodynamically stable, afebrile maintaining saturation on room air. Ongoing auditory hallucinations. Neurology incraesed Vimpat 100--> 150 mg BID, decreased keppra 1gm--> 750 mg BID and continue lamictal 125 mg BID. Psych consult yesterday--discontinued Seroquel and added olanzapine at bedtime. Recommend continuing Zoloft. She is awaiting pre-CERT for discharge to [facility]. ROS:  Constitutional:  negative for chills, fevers, sweats  Respiratory:  negative for cough, dyspnea on exertion, hemoptysis, shortness of breath, wheezing  Cardiovascular:  negative for chest pain, chest pressure/discomfort, lower extremity edema, palpitations  Gastrointestinal:  negative for abdominal pain, constipation, diarrhea, nausea, vomiting  Neurological:  negative for dizziness, headache  BRIEF HISTORY     67-year-old female presented with chief complaints of altered mental status and ED. Was found drowsy by daughter on 10/2/2022. She was brought in by EMS with altered mental status opening eyes to painful stimuli and low GCS. In the ER she was meeting SIRS criteria and decision was made to intubate the patient for airway protection. Before intubation Narcan was also tried as there was a suspicion for acute toxic metabolic encephalopathy due to prescription opioid use. She was transferred to ICU for further management.     In the ICU initially after intubation the patient was combative and not intolerant of the ET tube so she was sedated with propofol. Also started on IV fluids for ongoing hypotension. She has known history of seizures and was on antiseizure medications at home. CT scan chest showed bilateral pulmonary infiltrates concerning for aspiration pneumonia and she was started on broad-spectrum antibiotics. Neurology was also consulted for seizure work-up. 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 She has hx of intra-abdominal bleeding secondary to splenic mass with GI infiltration s/p embolization of the spleen. 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 They recommend stent placement if patient goes into complete lower bowel obstruction. During the ICU stay, the patient was on LTME which showed 6 subclinical seizures over 24 hours with left hand flapping and left head deviation during for electroclinical seizures. The patient was started with home dose of Keppra with loading dose 2 g and 1 mg Ativan for seizures and maintenance dose of Keppra increased to 1 g twice daily. OBJECTIVE     Vital Signs:  /77   Pulse 82   Temp 98.7 °F (37.1 °C) (Oral)   Resp 26   Ht 5' 5\" (1.651 m)   Wt 150 lb 5.7 oz (68.2 kg)   SpO2 94%   BMI 25.02 kg/m²     Temp (24hrs), Av.2 °F (36.8 °C), Min:97.9 °F (36.6 °C), Max:98.7 °F (37.1 °C)    In: 850   Out: 304 [Urine:300]    Physical Exam:  Constitutional: This is a well developed, well nourished, 25-29.9 - Overweight 59y.o. year old female who is alert, oriented, cooperative and in no apparent distress. Head:normocephalic and atraumatic. Respiratory: Breath sounds bilaterally were clear to auscultation. There were no wheezes, rhonchi or rales.  There is no intercostal retraction or use of accessory muscles. Cardiovascular: Regular without murmur, clicks, gallops or rubs. Abdomen: Slightly rounded and soft without organomegaly. No rebound, rigidity or guarding was appreciated. Musculoskeletal:  No gross muscle weakness. Extremities:  No lower extremity edema, ulcerations, tenderness, varicosities or erythema. Muscle size, tone and strength are normal.  No involuntary movements are noted. Skin:  Warm and dry. Good color, turgor and pigmentation. No lesions or scars.   No cyanosis or clubbing  Neurological/Psychiatric: The patient's general behavior, level of consciousness, thought content and emotional status is normal.        Medications:  Scheduled Medications:    potassium chloride  40 mEq Oral Once    OLANZapine  5 mg Oral Nightly    lacosamide  150 mg Oral BID    levETIRAcetam  750 mg Oral BID    famotidine  20 mg Oral Daily    amLODIPine  10 mg Oral Daily    metoprolol tartrate  25 mg Oral BID    miconazole   Topical BID    docusate  100 mg Oral Daily    apixaban  5 mg Oral BID    lamoTRIgine  125 mg Oral BID    sertraline  100 mg Oral Daily    melatonin  5 mg Oral Nightly    sodium chloride flush  5-40 mL IntraVENous 2 times per day     Continuous Infusions:    sodium chloride Stopped (10/10/22 1232)     PRN Medicationsloperamide, 2 mg, TID PRN  sodium chloride flush, 10 mL, PRN  LORazepam, 1 mg, Q4H PRN  oxyCODONE-acetaminophen, 1 tablet, Q4H PRN  morphine, 2 mg, Q4H PRN  albuterol, 2.5 mg, Q6H PRN  racepinephrine HCl, 11.25 mg, Q3H PRN  sodium chloride nebulizer, 3 mL, Q4H PRN  sodium chloride flush, 5-40 mL, PRN  sodium chloride, , PRN  ondansetron, 4 mg, Q8H PRN   Or  ondansetron, 4 mg, Q6H PRN  polyethylene glycol, 17 g, Daily PRN  acetaminophen, 650 mg, Q6H PRN   Or  acetaminophen, 650 mg, Q6H PRN      Diagnostic Labs:  CBC:   Recent Labs     10/12/22  0753 10/13/22  0513 10/14/22  0251   WBC 9.5 9.1 8.4   RBC 4.06 3.80* 3.78*   HGB 12.0 11.3* 11.4*   HCT 37.8 35.6* 35.1* MCV 93.1 93.7 92.9   RDW 17.2* 17.3* 17.2*    395 477*       BMP:   Recent Labs     10/12/22  0753 10/13/22  0513 10/14/22  0251    139 138   K 3.3* 3.9 3.7    102 101   CO2 26 24 23   BUN 18 20 23*   CREATININE 1.10* 1.11* 1.14*       BNP: No results for input(s): BNP in the last 72 hours. PT/INR: No results for input(s): PROTIME, INR in the last 72 hours. APTT: No results for input(s): APTT in the last 72 hours. CARDIAC ENZYMES: No results for input(s): CKMB, CKMBINDEX, TROPONINI in the last 72 hours. Invalid input(s): CKTOTAL;3  FASTING LIPID PANEL:  Lab Results   Component Value Date    CHOL 131 03/10/2021    HDL 33 (L) 03/10/2021    TRIG 147 03/10/2021     LIVER PROFILE: No results for input(s): AST, ALT, ALB, BILIDIR, BILITOT, ALKPHOS in the last 72 hours. MICROBIOLOGY:   Lab Results   Component Value Date/Time    CULTURE NO GROWTH 5 DAYS 10/03/2022 02:44 PM       Imaging:    XR CHEST PORTABLE    Result Date: 10/5/2022  Interval extubation. Interstitial opacities have increased in the interval, for which developing edema should be considered. MRI BRAIN W WO CONTRAST    Result Date: 10/9/2022  1. Overall, findings are similar compared to brain MRI done October 4, 2022 with redemonstration of patchy areas of FLAIR hyperintense signal in the left posterior frontal lobe and right greater than left occipital lobes which may represent small old infarcts. 2.  No acute stroke, intracranial hemorrhage or abnormal enhancement. 3.  Mild chronic small vessel ischemic disease. MRI BRAIN WO CONTRAST    Result Date: 10/4/2022  1. New subtle focus of T2/FLAIR hyperintensity in the lateral left frontal cortex and subcortical white matter is nonspecific and may reflect sequela of prior ischemia or mild posterior reversal encephalopathy syndrome. Short interval follow-up is recommended. 2. No evidence of acute infarct or intracranial hemorrhage.  3. Stable small foci of right occipital encephalomalacia in keeping with sequela of prior insult. ASSESSMENT & PLAN     ASSESSMENT / PLAN:     Principal Problem:    Septicemia ([address]  Active Problems:    Malignant neoplasm of ovary (HCC)    Seizure ([address]    Acute encephalopathy    Seizure disorder, status epilepticus, nonconvulsive ([address]    Cancer, metastatic to bone ([address]    Chronic deep vein thrombosis (DVT) of femoral vein of left lower extremity (HCC)    Iron deficiency anemia secondary to inadequate dietary iron intake    AMS (altered mental status)    Gastroesophageal reflux disease    Metabolic encephalopathy    Breakthrough seizure ([address]  Resolved Problems:    Type 2 diabetes mellitus without complication, without long-term current use of insulin ([address]     Relapsed Ovarian carcinoma with metastasis  -Diagnosed 2005 with intraperitoneal carcinomatosis s/p surgical debulking and systemic chemotherapy. - Relapse in last relapse in 2014, lost to follow-up since  - Metastatic relapse again, biopsy confirmed ovarian cancer recurrence with intra-abdominal and splenic involvement as well as lung metastasis  - Currently on active chemotherapy last treatment on 09/29/2022, hematology/oncology continues to follow the patient. - Further cancer treatment outpatient with oncology. Subclinical seizures- controlled  - Neurology recommends continuing Keppra 750 mg twice daily, Vimpat 150 mg twice daily and Lamictal 125 mg twice daily.  - MRI brain shows no acute infarct or hemorrhage and stable small foci of right occipital encephalomalacia.  -Follow-up MRI brain is stable compared to prior image, no obvious evidence of PRES, no evidence of metastatic lesions to the brain.  - Seizures well-controlled last seizure 9/06  - Outpatient follow-up with neurology in 4 to 6 weeks.     Aspiration pneumonia-resolved  - Possibly secondary to altered mentation and acute metabolic encephalopathy  -Completed course of levaquin  - Supplemental oxygen therapy as needed. - Continue nebulizer treatments and incentive spirometry  - Aspiration precautions     Primary hypertension  - Started on Norvasc 10 mg daily, blood pressure trolled  - Monitor blood pressure    History of DVT/PE  - Continue anticoagulation with Eliquis 5 mg twice daily    Depression and hallucinations  - Psychiatry recommends discontinuing Seroquel at added olanzapine at bedtime.  - Continued on Zoloft      DVT ppx : Lovenox  GI ppx: Pepcid    PT/OT: Consulted  Discharge Planning / [place]: Esperanza Pablo pre-CERT for [facility]    Kalli Sexton MD  Internal Medicine Resident, PGY-1  [address], [place]  10/14/2022, 11:26 AM

## 2022-10-14 NOTE — CARE COORDINATION
CM called and left voice message requesting call back for gustavo Sauer. CM requested update on status of precert.

## 2022-10-14 NOTE — PROGRESS NOTES
Neurology Nurse Practitioner Progress Note      INTERVAL HISTORY: This is a 59 y.o.  female admitted 10/3/2022 for acute onset altered mentation. This is a follow-up neurology progress note. The patient was examined and the chart was reviewed. Discussed with the RN. She stays alert and oriented x 3, slept well last night. Still hearing repeated sentences; yesterday saw someone or something moving in her peripheral vision. Denied any other symptoms. HPI: Renato Alanis is a 59 y.o. 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 Daughter reported that on the morning of arrival they let her mother sleep in. When they went in to check on her later patient was difficult to arouse. Patient's other daughter saw the patient vomiting on her bed through camera and then she became incontinent and behaving in an erratic way. Seizures were witnessed. EMS were called. Last known well was 2300 the night prior. Patient has prior history of PRES 10/2020 with new onset seizure disorder during that time. Reportedly patient has had multiple hospitalization for breakthrough seizures with negative work-up and AED adjustments. Currently she has been taking Keppra 1 g twice daily and Lamictal 125 mg twice daily. Neurology was consulted on 10/4 for ongoing disorientation and agitation, after extubation on 10/4. Patient underwent MRI brain (10/4)-prior infarcts versus mild press? 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 patient was hooked up to LTME - total of 18 subclinical seizures along with mild to moderate encephalopathy (last seizure on 10/6/2022).   Patient was continued on Keppra 1 g twice daily and Lamictal 125 mg twice daily; Vimpat 100 mg twice daily was added to the regimen. She was started on Seroquel 25 mg twice daily due to insomnia and hallucinations. Over time patient was alert and oriented. We signed off on 10/11/2022. We were reconsulted on 10/13 due to ongoing hallucinations. potassium chloride  40 mEq Oral Once    OLANZapine  5 mg Oral Nightly    lacosamide  150 mg Oral BID    levETIRAcetam  750 mg Oral BID    famotidine  20 mg Oral Daily    amLODIPine  10 mg Oral Daily    metoprolol tartrate  25 mg Oral BID    miconazole   Topical BID    docusate  100 mg Oral Daily    apixaban  5 mg Oral BID    lamoTRIgine  125 mg Oral BID    sertraline  100 mg Oral Daily    melatonin  5 mg Oral Nightly    sodium chloride flush  5-40 mL IntraVENous 2 times per day       Past Medical History:   Diagnosis Date    Anemia     Bleeding 10/2020    intra-abdominal bleeding -due to splenic mass with GI infiltration. Status post embolization    Cervical cancer (HCC)     Depression     Diabetes mellitus ([facility]     GERD (gastroesophageal reflux disease)     Hx of blood clots     Hypertension     Metastatic cancer ([facility] [facility] 10/2020    extensive intraabdominal and splenic involvement and lung mets.     Ovarian cancer ([facility] [address]     low grade serous ovarian carcinoma    Post chemo evaluation     2007: Chemo via med onc (Dr. Dunia Jackson), 2008: Casey Curry due to rising CA-125, 2013: intraperitoneal chemo,12/2015: Ca125 - 25     PRES (posterior reversible encephalopathy syndrome)     Splenic lesion        Past Surgical History:   Procedure Laterality Date    ABSCESS DRAINAGE  2013    Franca rectal    ANUS SURGERY      ANAL FISSURECTOMY    CARDIAC CATHETERIZATION      COLECTOMY  03/2013    ex lap, tumor debulking, transverse colectomy w reanastamosis, subgastric omentectomy, intraperitoneal port placement    HYSTERECTOMY, TOTAL ABDOMINAL (CERVIX REMOVED)      IR EMBOLIZATION HEMORRHAGE  10/05/2020    intra-abdominal bleeding -due to splenic mass with GI infiltration. Status post embolization [employer] interlock coils x7. mri condtional 3t ok, safe immediately post implant. IR PORT PLACEMENT EQUAL OR GREATER THAN 5 YEARS  08/24/2020    IR PORT PLACEMENT EQUAL OR GREATER THAN 5 YEARS 8/24/2020 MD SHAHRAM BluntZ SPECIAL PROCEDURES    PORT SURGERY      IP Port    TONSILLECTOMY         PHYSICAL EXAM:    Blood pressure (!) 151/79, pulse 86, temperature 98 °F (36.7 °C), temperature source Oral, resp. rate 16, height 5' 5\" (1.651 m), weight 150 lb 5.7 oz (68.2 kg), SpO2 93 %. ROS: Auditory hallucinations        Neurological Examination:  Mental status   She stays alert and oriented x 3, slept well last night. Still hearing repeated sentences; yesterday saw someone or something moving in her peripheral vision. No hallucinations noted at this time.  She has been following simple commands   Cranial nerves Grossly intact   Motor function  Strength: Patient has been using all limbs purposefully and equally  Normal bulk and tone                Sensory function Grossly intact     Cerebellar No visible tremors   Reflex function Intact    Gait                  Not tested       DATA    Lab Results   Component Value Date    WBC 8.4 10/14/2022    RBC 3.78 (L) 10/14/2022    HGB 11.4 (L) 10/14/2022    HCT 35.1 (L) 10/14/2022     (H) 10/14/2022    ALT 9 10/03/2022    AST 18 10/03/2022     10/14/2022    K 3.7 10/14/2022    MG 2.1 10/12/2022    PHOS 4.0 10/08/2022     10/14/2022    AMMONIA 36 10/03/2022    CREATININE 1.14 (H) 10/14/2022    BUN 23 (H) 10/14/2022    CO2 23 10/14/2022    TSH 2.93 03/09/2021    INR 1.2 05/03/2022    EUNIEYYU31 654 06/04/2022    FOLATE 8.7 06/04/2022    LABA1C 5.2 04/13/2022     Lab Results   Component Value Date    CHOL 131 03/10/2021     Lab Results   Component Value Date    TRIG 147 03/10/2021     Lab Results   Component Value Date    HDL 33 (L) 03/10/2021     Lab Results   Component Value Date    LDLCHOLESTEROL 69 03/10/2021     Lab Results   Component Value Date    VLDL NOT REPORTED 03/10/2021     Lab Results   Component Value Date    CHOLHDLRATIO 4.0 03/10/2021     NMDA-R Ab: Ordered      DIAGNOSTIC DATA:  CT HEAD (10/3/2022): No acute intracranial abnormality     MRI BRAIN (10/4/2022):   1. New subtle focus of T2/FLAIR hyperintensity in lateral L frontal cortex & subcortical white matter    is nonspecific and may reflect sequela of prior infarct or mild PRES. 2. Stable small foci of R occipital encephalomalacia; sequela of prior insult. MRI BRAIN W/WO (10/9/2022):   Redemonstration of patchy FLAIR hyperintense signal in L posterior frontal lobe & R>L occipital    lobes, which may represent small old infarcts. CTA HEAD & NECK (10/3/2022): No LVO      ECHO (2/22/2022): EF 55%      LTME (10/4 - 10/9/2022): Day 1: During the recording, the patient had 1 subclinical seizure lasting about 40 seconds originating from L hemisphere. The interictal EEG was abnormal due to diffuse polymorphic theta slowing suggesting mild to moderate encephalopathy. Continuous L hemispheric slowing suggested underlying structural defect. frequent L central parietal sharp waves and lateralized periodic discharges conferred an increased risk for focal onset seizures    Day 2: During the recording, the patient had 11 electroclinical seizures with L hand flapping & L head deviation. Electrographically, these seizures were similar to yesterday seizures. The interictal EEG was abnormal due to diffuse polymorphic theta slowing suggesting mild to moderate encephalopathy. Continuous L hemispheric slowing suggested underlying structural defect. Frequent L central parietal sharp waves & lateralized periodic discharges (LPDs) conferred an increased risk for focal onset seizures    Day 3: During the recording, the patient had 6 L hemispheric onset seizures.  Electrographically, these seizures were similar to yesterday seizures. The interictal EEG was abnormal due to diffuse polymorphic theta slowing suggesting mild to moderate encephalopathy. Continuous L hemispheric slowing suggested underlying structural defect. Frequent L central parietal sharp waves & lateralized periodic discharges (LPDs) conferred an increased risk for focal onset seizures    Day 4 & 5: During this day of recording no events were recorded. Continuous L hemispheric slowing suggested underlying structural defect. Occasional L central parietal sharp waves & lateralized periodic discharges (LPDs) conferred an increased risk for focal onset seizures    Day 6: During this day of recording no events were recorded. Continuous L hemispheric slowing suggested underlying structural defect. Previously seen L sided LPDs were not seen on today's recording                  IMPRESSION: 59 y.o.  female admitted with  Acute metabolic encephalopathy in the setting of HTN urgency (150/122 mm Hg), febrile illness with +SIRS criteria, aspiration pneumonia & subclinical status. 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  Heme/Onc on bord    Comorbid conditions - HTN, DM, GERD, depression            PLAN:  NMDA-R Ab - sent out test ordered    Will increase the dose of Vimpat 150 mg BID PO (previously 100 mg BID) & decrease the dose of Keppra 750 mg BID PO (previously 1 g BID) & Lamictal 125 mg BID    Seroquel was switched to Zyprexa 5 mg HS by psych team    Continue PT/OT; she ambulated 80' with RW (10/13)    We will follow    Please note that this note was generated using a voice recognition dictation software. Although every effort was made to ensure the accuracy of this automated transcription, some errors in transcription may have occurred.

## 2022-10-14 NOTE — PROGRESS NOTES
Physical Therapy  Facility/Department: [address] STEPDOWN  Daily Treatment note    Name: Jim Mckeon  : 1965  MRN: [id number]  Date of Service: 10/14/2022    Discharge Recommendations:  Patient would benefit from continued therapy after discharge   PT Equipment Recommendations  Equipment Needed: Yes      Patient Diagnosis(es): The primary encounter diagnosis was Septicemia [facility]). Diagnoses of Altered mental status, unspecified altered mental status type, Metabolic encephalopathy, Seizure ([address] Seizure disorder ([address] Acute respiratory failure with hypoxia ([address] and Breakthrough seizure ([facility] [address] were also pertinent to this visit. Past Medical History:  has a past medical history of Anemia, Bleeding, Cervical cancer ([facility] [address] Depression, Diabetes mellitus ([address] GERD (gastroesophageal reflux disease), Hx of blood clots, Hypertension, Metastatic cancer ([facility] [address] Ovarian cancer ([facility] [address] Post chemo evaluation, PRES (posterior reversible encephalopathy syndrome), and Splenic lesion. Past Surgical History:  has a past surgical history that includes Hysterectomy, total abdominal; Port Surgery; Tonsillectomy; IR PORT PLACEMENT > 5 YEARS (2020); Anus surgery; Abscess Drainage (); colectomy (2013); IR EMBOLIZATION HEMORRHAGE (10/05/2020); and Cardiac catheterization. Assessment   Body Structures, Functions, Activity Limitations Requiring Skilled Therapeutic Intervention: Decreased functional mobility ; Decreased strength;Decreased cognition;Decreased safe awareness;Decreased endurance;Decreased balance  Assessment: Pt amb 60 RW with CG A, Pt continue to be impulsive and unsafe amb without assist. . Pt has no insight to deficits making her a high fall risk. Recomending continued therapy to address deficits. Pt will require 24hrs supervision and assistance with mobility upon discahrge.   Therapy Prognosis: Good  Requires PT Follow-Up: Yes  Activity Tolerance  Activity Tolerance: Patient tolerated treatment well;Patient limited by fatigue;Patient limited by endurance     Plan   Physcial Therapy Plan  General Plan:  (5-6x/wk)  Current Treatment Recommendations: Strengthening, Balance training, Functional mobility training, Transfer training, Patient/Caregiver education & training, Safety education & training, Home exercise program, Equipment evaluation, education, & procurement, Therapeutic activities, Endurance training, Gait training, Stair training  Safety Devices  Type of Devices: Gait belt, Left in bed, Call light within reach, Bed alarm in place, Patient at risk for falls, Nurse notified  Restraints  Restraints Initially in Place: No  Restraints: 4 bed rails d/t seizure precautions     Restrictions  Restrictions/Precautions  Restrictions/Precautions: General Precautions, Up as Tolerated, Fall Risk  Required Braces or Orthoses?: No  Position Activity Restriction  Other position/activity restrictions: LTME, extubated 10/4     Subjective   General  Patient assessed for rehabilitation services?: Yes  Response To Previous Treatment: Patient with no complaints from previous session. Family / Caregiver Present: No  Follows Commands: Within Functional Limits  Subjective  Subjective: Pt supine in bed and agreeable to therapy, Pt denies pain at rest.requested assit to use rest roomc/o of fatigue from going to and from bathroom all day.        Vision/Hearing       Cognition   Orientation  Overall Orientation Status: Within Normal Limits  Orientation Level: Oriented to time;Oriented to place;Oriented to situation;Oriented to person     Objective      Balance  Sitting: Without support (EOB and toilet ~10 minutes total throughout session and in chair upright 30 minutes)  Standing: Without support (Stood at tray table unsupported ~2 minutes and at sink unsupported x5 minutes SBA for safety and cues)  Gait  Overall Level of Assistance: Stand-by assistance  Interventions: Safety awareness training;Verbal cues  Distance (ft): (Room distances without device short distance)  Bed mobility  Supine to Sit: Contact guard assistance  Sit to Supine: Stand by assistance  Scooting: Stand by assistance  Bed Mobility Comments: Pt continue to be impulsive and unsafe making her a fall risk. Transfers  Sit to Stand: Contact guard assistance  Stand to Sit: Contact guard assistance  Bed to Chair: Contact guard assistance (Toilet seat x2)  Comment: Transfer with RW. Ambulation  Surface: Level tile  Device: Rolling Walker  Assistance: Contact guard assistance  Quality of Gait: unsteady, c  Gait Deviations: Slow Cynthia;Decreased step length  Distance: 60  Comments: demo decrease safety awareness t/o unsafe to amb without asssist .  Stairs/Curb  Stairs?: No     Balance  Sitting - Static: Fair;+  Sitting - Dynamic: Fair  Standing - Static: Fair;-  Standing - Dynamic: Good  Exercise Treatment: Seated LE exercise program: Long Arc Quads, hip abduction/adduction, heel/toe raises, and marches. Reps:x10    AM-PAC Score  -PAC Inpatient Mobility Raw Score : 18 (10/08/22 1217)  AM-PAC Inpatient T-Scale Score : 43.63 (10/08/22 1217)  Mobility Inpatient CMS 0-100% Score: 46.58 (10/08/22 1217)  Mobility Inpatient CMS G-Code Modifier : CK (10/08/22 1217)   Goals  Short Term Goals  Time Frame for Short Term Goals: 14 visits  Short Term Goal 1: Pt will perform sit<>stand transfer with SBA. Short Term Goal 2: Pt will ambulate 125 feet with least restrictive device and SBA to increase functional independence. Short Term Goal 3: Pt will demonstrate fair+ standing balance to decrease fall risk. Short Term Goal 4: Pt will tolerate a 35 minute therapy session to promote increased endurance. Education  Patient Education  Education Given To: Patient  Education Provided: Role of Therapy;Transfer Training;Equipment;Plan of Care; Fall Prevention Strategies  Education Method: Verbal  Education Outcome: Continued education needed;Demonstrated understanding      Therapy Time   Individual Concurrent Group Co-treatment   Time In 1432         Time Out 1456         Minutes 24         Timed Code Treatment Minutes: [address], PTA

## 2022-10-15 LAB
ABSOLUTE EOS #: 0.27 K/UL (ref 0–0.44)
ABSOLUTE IMMATURE GRANULOCYTE: 0.04 K/UL (ref 0–0.3)
ABSOLUTE LYMPH #: 1.08 K/UL (ref 1.1–3.7)
ABSOLUTE MONO #: 0.88 K/UL (ref 0.1–1.2)
ANION GAP SERPL CALCULATED.3IONS-SCNC: 18 MMOL/L (ref 9–17)
BASOPHILS # BLD: 0 % (ref 0–2)
BASOPHILS ABSOLUTE: 0.03 K/UL (ref 0–0.2)
BUN BLDV-MCNC: 18 MG/DL (ref 6–20)
CALCIUM SERPL-MCNC: 9 MG/DL (ref 8.6–10.4)
CHLORIDE BLD-SCNC: 103 MMOL/L (ref 98–107)
CO2: 21 MMOL/L (ref 20–31)
CREAT SERPL-MCNC: 0.97 MG/DL (ref 0.5–0.9)
EOSINOPHILS RELATIVE PERCENT: 4 % (ref 1–4)
GFR SERPL CREATININE-BSD FRML MDRD: >60 ML/MIN/1.73M2
GLUCOSE BLD-MCNC: 132 MG/DL (ref 70–99)
HCT VFR BLD CALC: 37.9 % (ref 36.3–47.1)
HEMOGLOBIN: 11.8 G/DL (ref 11.9–15.1)
IMMATURE GRANULOCYTES: 1 %
LYMPHOCYTES # BLD: 15 % (ref 24–43)
MAGNESIUM: 2 MG/DL (ref 1.6–2.6)
MCH RBC QN AUTO: 29.5 PG (ref 25.2–33.5)
MCHC RBC AUTO-ENTMCNC: 31.1 G/DL (ref 28.4–34.8)
MCV RBC AUTO: 94.8 FL (ref 82.6–102.9)
MONOCYTES # BLD: 12 % (ref 3–12)
NRBC AUTOMATED: 0 PER 100 WBC
PDW BLD-RTO: 17.2 % (ref 11.8–14.4)
PLATELET # BLD: 564 K/UL (ref 138–453)
PMV BLD AUTO: 9.5 FL (ref 8.1–13.5)
POTASSIUM SERPL-SCNC: 3.5 MMOL/L (ref 3.7–5.3)
RBC # BLD: 4 M/UL (ref 3.95–5.11)
RBC # BLD: ABNORMAL 10*6/UL
SEG NEUTROPHILS: 68 % (ref 36–65)
SEGMENTED NEUTROPHILS ABSOLUTE COUNT: 4.95 K/UL (ref 1.5–8.1)
SODIUM BLD-SCNC: 142 MMOL/L (ref 135–144)
WBC # BLD: 7.3 K/UL (ref 3.5–11.3)

## 2022-10-15 PROCEDURE — 2060000000 HC ICU INTERMEDIATE R&B

## 2022-10-15 PROCEDURE — 6370000000 HC RX 637 (ALT 250 FOR IP)

## 2022-10-15 PROCEDURE — 2580000003 HC RX 258: Performed by: STUDENT IN AN ORGANIZED HEALTH CARE EDUCATION/TRAINING PROGRAM

## 2022-10-15 PROCEDURE — 36415 COLL VENOUS BLD VENIPUNCTURE: CPT

## 2022-10-15 PROCEDURE — 99232 SBSQ HOSP IP/OBS MODERATE 35: CPT | Performed by: INTERNAL MEDICINE

## 2022-10-15 PROCEDURE — 99232 SBSQ HOSP IP/OBS MODERATE 35: CPT | Performed by: PSYCHIATRY & NEUROLOGY

## 2022-10-15 PROCEDURE — 80048 BASIC METABOLIC PNL TOTAL CA: CPT

## 2022-10-15 PROCEDURE — 83735 ASSAY OF MAGNESIUM: CPT

## 2022-10-15 PROCEDURE — 6370000000 HC RX 637 (ALT 250 FOR IP): Performed by: STUDENT IN AN ORGANIZED HEALTH CARE EDUCATION/TRAINING PROGRAM

## 2022-10-15 PROCEDURE — 85025 COMPLETE CBC W/AUTO DIFF WBC: CPT

## 2022-10-15 PROCEDURE — 6370000000 HC RX 637 (ALT 250 FOR IP): Performed by: PSYCHIATRY & NEUROLOGY

## 2022-10-15 PROCEDURE — 6370000000 HC RX 637 (ALT 250 FOR IP): Performed by: NURSE PRACTITIONER

## 2022-10-15 RX ORDER — POTASSIUM CHLORIDE 20 MEQ/1
40 TABLET, EXTENDED RELEASE ORAL ONCE
Status: COMPLETED | OUTPATIENT
Start: 2022-10-15 | End: 2022-10-15

## 2022-10-15 RX ADMIN — OXYCODONE HYDROCHLORIDE AND ACETAMINOPHEN 1 TABLET: 5; 325 TABLET ORAL at 22:58

## 2022-10-15 RX ADMIN — OLANZAPINE 5 MG: 5 TABLET, FILM COATED ORAL at 21:02

## 2022-10-15 RX ADMIN — LAMOTRIGINE 125 MG: 100 TABLET ORAL at 21:02

## 2022-10-15 RX ADMIN — OXYCODONE HYDROCHLORIDE AND ACETAMINOPHEN 1 TABLET: 5; 325 TABLET ORAL at 13:28

## 2022-10-15 RX ADMIN — OXYCODONE HYDROCHLORIDE AND ACETAMINOPHEN 1 TABLET: 5; 325 TABLET ORAL at 03:22

## 2022-10-15 RX ADMIN — SODIUM CHLORIDE, PRESERVATIVE FREE 10 ML: 5 INJECTION INTRAVENOUS at 09:34

## 2022-10-15 RX ADMIN — LEVETIRACETAM 750 MG: 500 TABLET, FILM COATED ORAL at 21:02

## 2022-10-15 RX ADMIN — LOPERAMIDE HYDROCHLORIDE 2 MG: 2 CAPSULE ORAL at 22:58

## 2022-10-15 RX ADMIN — Medication 5 MG: at 21:02

## 2022-10-15 RX ADMIN — METOPROLOL TARTRATE 25 MG: 25 TABLET ORAL at 21:02

## 2022-10-15 RX ADMIN — ANTI-FUNGAL POWDER MICONAZOLE NITRATE TALC FREE: 1.42 POWDER TOPICAL at 09:34

## 2022-10-15 RX ADMIN — OXYCODONE HYDROCHLORIDE AND ACETAMINOPHEN 1 TABLET: 5; 325 TABLET ORAL at 18:56

## 2022-10-15 RX ADMIN — LACOSAMIDE 150 MG: 100 TABLET, FILM COATED ORAL at 21:02

## 2022-10-15 RX ADMIN — LACOSAMIDE 150 MG: 100 TABLET, FILM COATED ORAL at 09:27

## 2022-10-15 RX ADMIN — SERTRALINE 100 MG: 50 TABLET, FILM COATED ORAL at 09:26

## 2022-10-15 RX ADMIN — FAMOTIDINE 20 MG: 20 TABLET, FILM COATED ORAL at 09:27

## 2022-10-15 RX ADMIN — APIXABAN 5 MG: 5 TABLET, FILM COATED ORAL at 09:27

## 2022-10-15 RX ADMIN — POTASSIUM CHLORIDE 40 MEQ: 20 TABLET, EXTENDED RELEASE ORAL at 13:27

## 2022-10-15 RX ADMIN — METOPROLOL TARTRATE 25 MG: 25 TABLET ORAL at 09:27

## 2022-10-15 RX ADMIN — AMLODIPINE BESYLATE 10 MG: 10 TABLET ORAL at 09:27

## 2022-10-15 RX ADMIN — LEVETIRACETAM 750 MG: 500 TABLET, FILM COATED ORAL at 09:27

## 2022-10-15 RX ADMIN — APIXABAN 5 MG: 5 TABLET, FILM COATED ORAL at 21:02

## 2022-10-15 RX ADMIN — LAMOTRIGINE 125 MG: 100 TABLET ORAL at 09:27

## 2022-10-15 RX ADMIN — SODIUM CHLORIDE, PRESERVATIVE FREE 10 ML: 5 INJECTION INTRAVENOUS at 21:03

## 2022-10-15 RX ADMIN — LORAZEPAM 1 MG: 1 TABLET ORAL at 21:02

## 2022-10-15 RX ADMIN — ANTI-FUNGAL POWDER MICONAZOLE NITRATE TALC FREE: 1.42 POWDER TOPICAL at 21:06

## 2022-10-15 ASSESSMENT — PAIN DESCRIPTION - LOCATION
LOCATION: ABDOMEN;BACK
LOCATION: ABDOMEN;BACK
LOCATION: BACK
LOCATION: BACK

## 2022-10-15 ASSESSMENT — ENCOUNTER SYMPTOMS
NAUSEA: 0
SHORTNESS OF BREATH: 1
COUGH: 1

## 2022-10-15 ASSESSMENT — PAIN SCALES - GENERAL
PAINLEVEL_OUTOF10: 7
PAINLEVEL_OUTOF10: 2
PAINLEVEL_OUTOF10: 0
PAINLEVEL_OUTOF10: 4
PAINLEVEL_OUTOF10: 8
PAINLEVEL_OUTOF10: 7
PAINLEVEL_OUTOF10: 8
PAINLEVEL_OUTOF10: 2

## 2022-10-15 ASSESSMENT — PAIN DESCRIPTION - DESCRIPTORS
DESCRIPTORS: ACHING
DESCRIPTORS: DULL

## 2022-10-15 ASSESSMENT — PAIN - FUNCTIONAL ASSESSMENT
PAIN_FUNCTIONAL_ASSESSMENT: ACTIVITIES ARE NOT PREVENTED

## 2022-10-15 ASSESSMENT — PAIN DESCRIPTION - ORIENTATION
ORIENTATION: MID
ORIENTATION: MID

## 2022-10-15 NOTE — PROGRESS NOTES
fluids for ongoing hypotension. She has known history of seizures and was on antiseizure medications at home. CT scan chest showed bilateral pulmonary infiltrates concerning for aspiration pneumonia and she was started on broad-spectrum antibiotics. Neurology was also consulted for seizure work-up. 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 She has hx of intra-abdominal bleeding secondary to splenic mass with GI infiltration s/p embolization of the spleen. 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 They recommend stent placement if patient goes into complete lower bowel obstruction. OBJECTIVE     Vital Signs:  /70   Pulse 75   Temp 97.7 °F (36.5 °C) (Oral)   Resp 20   Ht 5' 5\" (1.651 m)   Wt 143 lb 6.4 oz (65 kg)   SpO2 98%   BMI 23.86 kg/m²     Temp (24hrs), Av.1 °F (36.7 °C), Min:97.7 °F (36.5 °C), Max:98.7 °F (37.1 °C)    In: 850   Out: 300 [Urine:300]    Physical Exam:  Constitutional: This is a well developed, well nourished, 18.5-24.9 - Normal 59y.o. year old female who is alert, oriented, cooperative and in no apparent distress. Head:normocephalic and atraumatic. Respiratory:  Breath sounds bilaterally were clear to auscultation. There were no wheezes, rhonchi or rales. There is no intercostal retraction or use of accessory muscles. Cardiovascular: Regular without murmur, clicks, gallops or rubs. Abdomen: Slightly rounded and soft without organomegaly. No rebound, rigidity or guarding was appreciated. Musculoskeletal:  No gross muscle weakness. Extremities:  No lower extremity edema, ulcerations, tenderness, varicosities or erythema. Muscle size, tone and strength are normal.  No involuntary movements are noted. Skin:  Warm and dry. Good color, turgor and pigmentation.  No lesions or scars. No cyanosis or clubbing  Neurological/Psychiatric: The patient's general behavior, level of consciousness, thought content and emotional status is normal.        Medications:  Scheduled Medications:    OLANZapine  5 mg Oral Nightly    lacosamide  150 mg Oral BID    levETIRAcetam  750 mg Oral BID    famotidine  20 mg Oral Daily    amLODIPine  10 mg Oral Daily    metoprolol tartrate  25 mg Oral BID    miconazole   Topical BID    docusate  100 mg Oral Daily    apixaban  5 mg Oral BID    lamoTRIgine  125 mg Oral BID    sertraline  100 mg Oral Daily    melatonin  5 mg Oral Nightly    sodium chloride flush  5-40 mL IntraVENous 2 times per day     Continuous Infusions:    sodium chloride Stopped (10/10/22 1232)     PRN Medicationsloperamide, 2 mg, TID PRN  sodium chloride flush, 10 mL, PRN  LORazepam, 1 mg, Q4H PRN  oxyCODONE-acetaminophen, 1 tablet, Q4H PRN  morphine, 2 mg, Q4H PRN  albuterol, 2.5 mg, Q6H PRN  racepinephrine HCl, 11.25 mg, Q3H PRN  sodium chloride nebulizer, 3 mL, Q4H PRN  sodium chloride flush, 5-40 mL, PRN  sodium chloride, , PRN  ondansetron, 4 mg, Q8H PRN   Or  ondansetron, 4 mg, Q6H PRN  polyethylene glycol, 17 g, Daily PRN  acetaminophen, 650 mg, Q6H PRN   Or  acetaminophen, 650 mg, Q6H PRN        Diagnostic Labs:  CBC:   Recent Labs     10/12/22  0753 10/13/22  0513 10/14/22  0251   WBC 9.5 9.1 8.4   RBC 4.06 3.80* 3.78*   HGB 12.0 11.3* 11.4*   HCT 37.8 35.6* 35.1*   MCV 93.1 93.7 92.9   RDW 17.2* 17.3* 17.2*    395 477*     BMP:   Recent Labs     10/12/22  0753 10/13/22  0513 10/14/22  0251    139 138   K 3.3* 3.9 3.7    102 101   CO2 26 24 23   BUN 18 20 23*   CREATININE 1.10* 1.11* 1.14*     BNP: No results for input(s): BNP in the last 72 hours. PT/INR: No results for input(s): PROTIME, INR in the last 72 hours. APTT: No results for input(s): APTT in the last 72 hours.   CARDIAC ENZYMES: No results for input(s): CKMB, CKMBINDEX, TROPONINI in the last 72 hours.    Invalid input(s): CKTOTAL;3  FASTING LIPID PANEL:  Lab Results   Component Value Date    CHOL 131 03/10/2021    HDL 33 (L) 03/10/2021    TRIG 147 03/10/2021     LIVER PROFILE: No results for input(s): AST, ALT, ALB, BILIDIR, BILITOT, ALKPHOS in the last 72 hours. MICROBIOLOGY:   Lab Results   Component Value Date/Time    CULTURE NO GROWTH 5 DAYS 10/03/2022 02:44 PM       Imaging:    MRI BRAIN W WO CONTRAST    Result Date: 10/9/2022  1. Overall, findings are similar compared to brain MRI done October 4, 2022 with redemonstration of patchy areas of FLAIR hyperintense signal in the left posterior frontal lobe and right greater than left occipital lobes which may represent small old infarcts. 2.  No acute stroke, intracranial hemorrhage or abnormal enhancement. 3.  Mild chronic small vessel ischemic disease. ASSESSMENT & PLAN     ASSESSMENT / PLAN:     Principal Problem:    Septicemia ([address]  Active Problems:    Hallucinations    Malignant neoplasm of ovary (HCC)    Seizure ([address]    Acute encephalopathy    Seizure disorder, status epilepticus, nonconvulsive ([address]    Cancer, metastatic to bone ([address]    Chronic deep vein thrombosis (DVT) of femoral vein of left lower extremity (HCC)    Iron deficiency anemia secondary to inadequate dietary iron intake    AMS (altered mental status)    Gastroesophageal reflux disease    Metabolic encephalopathy    Breakthrough seizure ([address]  Resolved Problems:    Type 2 diabetes mellitus without complication, without long-term current use of insulin ([address]     Relapsed Ovarian carcinoma with metastasis  -Diagnosed 2005 with intraperitoneal carcinomatosis s/p surgical debulking and systemic chemotherapy.   - Relapse in last relapse in 2014, lost to follow-up since  - Metastatic relapse again, biopsy confirmed ovarian cancer recurrence with intra-abdominal and splenic involvement as well as lung metastasis  - Currently on active chemotherapy last treatment on 09/29/2022, hematology/oncology continues to follow the patient. - Further cancer treatment outpatient with oncology. Subclinical seizures- controlled  - Neurology recommends continuing Keppra 750 mg twice daily, Vimpat 150 mg twice daily and Lamictal 125 mg twice daily.  - MRI brain shows no acute infarct or hemorrhage and stable small foci of right occipital encephalomalacia.  -Follow-up MRI brain is stable compared to prior image, no obvious evidence of PRES, no evidence of metastatic lesions to the brain.  - Seizures well-controlled last seizure 9/06  - Outpatient follow-up with neurology in 4 to 6 weeks. Aspiration pneumonia-resolved  - Possibly secondary to altered mentation and acute metabolic encephalopathy  - Completed course of levaquin  - Supplemental oxygen therapy as needed. - Continue nebulizer treatments and incentive spirometry  - Aspiration precautions     Primary hypertension  - Started on Norvasc 10 mg daily, blood pressure controlled  - Monitor blood pressure    History of DVT/PE  - Continue anticoagulation with Eliquis 5 mg twice daily    Depression and hallucinations  - Discontinued Seroquel. Started olanzapine per psychiatry  -Continue Zoloft      DVT ppx : Eliquis  GI ppx: Pepcid    PT/OT: Consulted  Discharge Planning / SW:  consulted, awaiting precert for SNF    Claire Torres MD  Internal Medicine Resident, PGY-1  [address], [place]  10/15/2022, 7:15 AM

## 2022-10-15 NOTE — PROGRESS NOTES
NEUROLOGY INPATIENT PROGRESS NOTE    10/15/2022         Subjective:   Mariano Salinas is a  59 y.o. female admitted on 10/3/2022 with Septicemia ([facility] [address] [A41.9]  Altered mental status, unspecified altered mental status type [R41.82]  AMS (altered mental status) [R41.82]    Interval History:  Briefly, this is a  59 y.o. female with history of PRES with resulting seizure disorder since 10/2020 and recurrent ovarian CA admitted on 10/3/2022 with AMS. Pt found to have multiple subclinical seizures, AED regimen adjusted, post ictal encephalitis improved but Neurology was re consulted due to ongoing hallucinations. Today, pt was seen and examined. C/o persistent auditory hallucinations described as a constant echo of what people say and intermittent visual hallucinations described as a shadow or \"flash\" on peripheral vision but then turns and sees that nothing is there. Denies any suicidal or homicidal ideations or hallucination. Denies any confusion. Complains of new cough, SOB, and yellow phlegm. No further seizures. Slept well last night. Reports has been walking with walker bc feels unsteady. ROS:    Review of Systems   HENT:  Positive for congestion. Negative for hearing loss. Eyes:  Negative for visual disturbance. Respiratory:  Positive for cough and shortness of breath. Gastrointestinal:  Negative for nausea. Musculoskeletal:  Positive for gait problem. Neurological:  Positive for weakness (generalized). Negative for dizziness, seizures, syncope, speech difficulty, light-headedness, numbness and headaches. Psychiatric/Behavioral:  Positive for hallucinations. Negative for behavioral problems, confusion, self-injury, sleep disturbance and suicidal ideas. History of Present Illness:     The patient is a 59 y.o. female with significant past medical history of recurrent ovarian cancer (initial diagnosis in 2005, follows with Dr. Yuriy Trivedi) with intra-abdominal mets and lymphedema, currently undergoing chemotherapy, recent active intra-abdominal bleeding due to splenic mass with GI infiltration, prior PRES with subsequent seizure disorder on keppra and lamictal who presents with acute onset altered mentation. Per daughter Bonnie Bowens, pt's other daughter noticed pt vomiting on her bed through camera in pt's room and pt thereafter reported to be incoherent and not answering questions appropriately, and behaving in an erratic way. Patient has chronic constipation. Per family, pt recently lost both of her sons and has missed the last few chemotherapy sessions 2/2 depression. Regarding seizure history,  Pt had new onset seizures (generalized tonic-clonic seizures witnessed at [address] [facility]) at age 60 in October 2020; at that time,  LTME showed multiple focal subclinical seizures starting at left posterior quadrant,  MRI brain showed flair hyperintensities in bilateral occipital regions, left parietal, and left frontal lobe with questionable restricted diffusion shaded with those lesions,  Seizures were felt to be 2/2 PRES likely 2/2 hypertensive spikes and underlying immunosuppression 2/2 pt's CA and associated treatments & pt was started on AED (Vimpat). Since then, pt has had multiple hospitalizations for breakthrough seizures with workup negative for any intracerebral metastasis and with appropriate adjustments to AEDs made. Most recently, pt's medications adjusted to Keppra 1 g BID and Lamictal 125 mg BID. Previous Workup:  LTME 10/24/2020: multiple subclinical seizures originating over L posterior quadrant and spreading across L hemisphere, diffuse left hemispheric sharp wave discharges   LTME 01/04/2021: Diffuse polymorphic theta slowing and occasional left hemispheric superimposed delta slowing suggesting moderate encephalopathy and underlying neuronal dysfunction. No epileptiform activity.   LTME 6/19/2021: Diffuse theta slowing, periods of diffuse attenuation, no epileptiform activity. LTME 2/23/2022: Diffuse theta slowing, and left occipital temporal lateralized periodic discharges with underlying rhythmicity suggesting increased risk for focal onset seizures  CSF analysis 10/2020: WBC, 0 RBC, total protein 25.4, glucose 78. Negative meningitis encephalitis panel. Negative cryptococcal antigen. MRI Brain w w/o contrast 2/24/2022: No evidence of metastatic disease, residual mild T2 hyperintensities in the occipital region bilaterally consistent with PRESS. Significant improvement compared to 10/2020  CT Head 10/3/2022: no acute intracranial abnormality  CTA H/N 10/3/2022: no evidence for sig stenosis or occlusion      No current facility-administered medications on file prior to encounter. Current Outpatient Medications on File Prior to Encounter   Medication Sig Dispense Refill    morphine (MS CONTIN) 15 MG extended release tablet TAKE 1 TABLET BY MOUTH 2 TIMES DAILY FOR 30 DAYS. 60 tablet 0    lamoTRIgine (LAMICTAL) 25 MG tablet Take 5 tablets by mouth 2 times daily 300 tablet 2    LORazepam (ATIVAN) 1 MG tablet TAKE 1 TABLET BY MOUTH EVERY 8 HOURS AS NEEDED FOR ANXIETY FOR UP TO 30 DAYS. 90 tablet 0    morphine (MS CONTIN) 30 MG extended release tablet TAKE 1 TABLET BY MOUTH 2 TIMES DAILY FOR 30 DAYS. 60 tablet 0    sertraline (ZOLOFT) 100 MG tablet TAKE 1 TABLET BY MOUTH DAILY 30 tablet 4    FEROSUL 325 (65 Fe) MG tablet TAKE 1 TABLET BY MOUTH ONCE DAILY WITH BREAKFAST 30 tablet 2    pantoprazole (PROTONIX) 40 MG tablet Take 1 tablet by mouth daily 30 tablet 3    Lactobacillus (ACIDOPHILUS) CAPS capsule TAKE 1 TABLET BY MOUTH ONCE DAILY IN THE MORNING AND 1 TABLET BEFORE BEDTIME 60 capsule 5    dicyclomine (BENTYL) 20 MG tablet TAKE 1 TABLET (20 MG TOTAL) BY MOUTH IN THE MORNING AND 1 TABLET (20 MG TOTAL) BEFORE BEDTIME.  60 tablet 3    Handicap Placard MISC 5 years 1 each 0    loperamide (IMODIUM) 2 MG capsule Take 2 mg by mouth 4 times daily as needed for Diarrhea ondansetron (ZOFRAN-ODT) 4 MG disintegrating tablet Take 1 tablet by mouth every 8 hours as needed for Nausea or Vomiting 60 tablet 2    senna (SENOKOT) 8.6 MG tablet Take 1 tablet by mouth 2 times daily 60 tablet 11    melatonin 5 MG TABS tablet Take 1 tablet by mouth daily 30 tablet 3    benzonatate (TESSALON PERLES) 100 MG capsule Take 1 capsule by mouth 3 times daily as needed for Cough 30 capsule 1    apixaban (ELIQUIS) 5 MG TABS tablet Take 1 tablet by mouth 2 times daily 60 tablet 5    hydrocortisone (ANUSOL-HC) 2.5 % CREA rectal cream Apply on affected area twice daily 1 each 1       Past Medical History:   Diagnosis Date    Anemia     Bleeding 10/2020    intra-abdominal bleeding -due to splenic mass with GI infiltration. Status post embolization    Cervical cancer (HCC)     Depression     Diabetes mellitus ([facility] [address]     GERD (gastroesophageal reflux disease)     Hx of blood clots     Hypertension     Metastatic cancer ([facility] [address] 10/2020    extensive intraabdominal and splenic involvement and lung mets. Ovarian cancer ([facility] [address]     low grade serous ovarian carcinoma    Post chemo evaluation     2007: Chemo via med onc (Dr. Nika Bartlett), 2008: Santana Hamman due to rising CA-125, 2013: intraperitoneal chemo,12/2015: Ca125 - 25     PRES (posterior reversible encephalopathy syndrome)     Splenic lesion        Past Surgical History:   Procedure Laterality Date    ABSCESS DRAINAGE  2013    Franca rectal    ANUS SURGERY      ANAL FISSURECTOMY    CARDIAC CATHETERIZATION      COLECTOMY  03/2013    ex lap, tumor debulking, transverse colectomy w reanastamosis, subgastric omentectomy, intraperitoneal port placement    HYSTERECTOMY, TOTAL ABDOMINAL (CERVIX REMOVED)      IR EMBOLIZATION HEMORRHAGE  10/05/2020    intra-abdominal bleeding -due to splenic mass with GI infiltration. Status post embolization [employer] interlock coils x7. mri condtional 3t ok, safe immediately post implant.     IR PORT PLACEMENT EQUAL OR GREATER THAN 5 YEARS 08/24/2020    IR PORT PLACEMENT EQUAL OR GREATER THAN 5 YEARS 8/24/2020 Adrian Bernard MD [facility] PROCEDURES    PORT SURGERY      IP Port    TONSILLECTOMY         Allergies: Juany Carr is allergic to carboplatin and ceftriaxone. Objective:     Medications:     OLANZapine  5 mg Oral Nightly    lacosamide  150 mg Oral BID    levETIRAcetam  750 mg Oral BID    famotidine  20 mg Oral Daily    amLODIPine  10 mg Oral Daily    metoprolol tartrate  25 mg Oral BID    miconazole   Topical BID    docusate  100 mg Oral Daily    apixaban  5 mg Oral BID    lamoTRIgine  125 mg Oral BID    sertraline  100 mg Oral Daily    melatonin  5 mg Oral Nightly    sodium chloride flush  5-40 mL IntraVENous 2 times per day     PRN Meds include: loperamide, sodium chloride flush, LORazepam, oxyCODONE-acetaminophen, morphine, albuterol, racepinephrine HCl, sodium chloride nebulizer, sodium chloride flush, sodium chloride, ondansetron **OR** ondansetron, polyethylene glycol, acetaminophen **OR** acetaminophen    NEUROLOGICAL EXAMINATION  /70   Pulse 75   Temp 97.7 °F (36.5 °C) (Oral)   Resp 20   Ht 5' 5\" (1.651 m)   Wt 143 lb 6.4 oz (65 kg)   SpO2 98%   BMI 23.86 kg/m²     Blood pressure range: Systolic (54DWL), PUK:582 , Min:129 , HJR:737   ; Diastolic (83WCA), QXX:08, Min:57, Max:77    Vitals:    10/15/22 0012 10/15/22 0322 10/15/22 0352 10/15/22 0558   BP:   134/70    Pulse: 85  75    Resp: 16 14 20    Temp: 97.8 °F (36.6 °C)  97.7 °F (36.5 °C)    TempSrc: Oral  Oral    SpO2:       Weight:    143 lb 6.4 oz (65 kg)   Height:           Physical Exam  Constitutional:       General: She is not in acute distress. Appearance: Normal appearance. HENT:      Head: Normocephalic. Cardiovascular:      Rate and Rhythm: Normal rate and regular rhythm.    Pulmonary:      Comments: SpO2 100% at bedside        MENTAL STATUS:  Alert, orientedx3, intact memory, no confusion, normal speech, normal language   CRANIAL NERVES: II -      Visual fields intact to confrontation  III,IV,VI -  PERR, EOMs intact, no ptosis  V     -     Normal facial sensation   VII    -     Normal facial symmetry  VIII   -     Intact hearing   IX,X -     Symmetrical palate  XI    -     Symmetrical shoulder shrug  XII   -     Midline tongue, no atrophy    MOTOR FUNCTION: RUE: Significant for good strength of grade 5/5 in proximal and distal muscle groups   LUE: Significant for good strength of grade 5/5 in proximal and distal muscle groups   RLE: Significant for good strength of grade 5/5 in proximal and distal muscle groups   LLE: Significant for good strength of grade 5/5 in proximal and distal muscle groups      Normal bulk, normal tone and no involuntary movements, no tremor   SENSORY FUNCTION:  Normal touch x4 limbs   CEREBELLAR FUNCTION:  Intact fine motor control over upper limbs    REFLEX FUNCTION:  Symmetric in upper and lower extremities   STATION and GAIT  Not tested     Data:    Lab Results:   CBC:   Recent Labs     10/13/22  0513 10/14/22  0251 10/15/22  0650   WBC 9.1 8.4 7.3   HGB 11.3* 11.4* 11.8*    477* 564*     BMP:    Recent Labs     10/13/22  0513 10/14/22  0251    138   K 3.9 3.7    101   CO2 24 23   BUN 20 23*   CREATININE 1.11* 1.14*   GLUCOSE 109* 108*         Lab Results   Component Value Date    CHOL 131 03/10/2021    LDLCHOLESTEROL 69 03/10/2021    HDL 33 (L) 03/10/2021    TRIG 147 03/10/2021    ALT 9 10/03/2022    AST 18 10/03/2022    TSH 2.93 03/09/2021    INR 1.2 05/03/2022    LABA1C 5.2 04/13/2022    [id number] 06/04/2022       No results found for: PHENYTOIN, PHENYTOIN, VALPROATE, CBMZ    IMAGING  MRI BRAIN W WO CONTRAST    Result Date: 10/9/2022  1. Overall, findings are similar compared to brain MRI done October 4, 2022 with redemonstration of patchy areas of FLAIR hyperintense signal in the left posterior frontal lobe and right greater than left occipital lobes which may represent small old infarcts.  2. No acute stroke, intracranial hemorrhage or abnormal enhancement. 3.  Mild chronic small vessel ischemic disease. Assessment and Plan  Case of a [address] y.o. female with significant past medical history of recurrent ovarian cancer (initial diagnosis in 2005, follows with Dr. Trevor Mark) with peritoneal and lung metastasis, lymphedema, currently undergoing chemotherapy, recent active intra-abdominal bleeding due to splenic mass with GI infiltration s/p embolization, prior PRES with subsequent seizure disorder (onset 10/2020) on keppra and lamictal who was admitted on 10/03/2022 for management of acute onset altered mentation initially requiring intubation for airway protection. Pt was febrile and with GCS 8 on initial presentation. Neurology team consulted for ongoing confusion/disorientation/agitation after extubation on 10/04/2022. Pt was continued on 1 g Keppra bid, 126 mg Lamictal bid, and Vimpat 100 mg BID was added. Pt also started on Seroquel 25 mg bid for insomnia and hallucinations. Pt mentation improved over time and Neuro team signed off 10/11/2022. Neuro team reconsulted 10/13 due to persistence of hallucinations. CT Head negative for acute intracranial abnormality. CTA H/N negative for stenosis or occlusion. MRI brain (10/4)-prior infarcts versus mild press?   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 twice daily (from 1000 mg twice daily)  Vimpat increased to 150 mg twice daily (from 100 mg twice daily)  Continue Lamictal 125 mg twice daily  Psychiatry following; Seroquel switched to Zyprexa  Process ruled out via repeat MRI  No new cerebral infarctions but multiple old ones shown on repeat brain MRI    Possible respiratory infection  SOB, yellow phlegm, cough   Management per primary team    Shelley Benz DO  Neurology Resident PGY-2  10/15/2022  8:18 AM

## 2022-10-15 NOTE — PROGRESS NOTES
Today's Date: 10/15/2022  Patient Name: Juany Carr  Date of admission: 10/3/2022  1:07 PM  Patient's age: 59 y.o., 1965  Admission Dx: Septicemia ([facility] [A41.9]  Altered mental status, unspecified altered mental status type [R41.82]  AMS (altered mental status) [R41.82]      Requesting Physician: No admitting provider for patient encounter. CHIEF COMPLAINT: Altered mental status. Sepsis. Recurrent metastatic ovarian cancer. History Obtained From:  patient, electronic medical record    Interval history:  Patient seen and examined bedside. Oriented to person, place, and time. Patient complains of continued auditory hallucinations and is having visual hallucinations as well. HISTORY OF PRESENT ILLNESS:      The patient is a 59 y.o.  female who is admitted to the hospital for further management of altered mental status. She had increasing shortness of breath. She was admitted for sepsis. She was intubated for airway protection. Patient was admitted on October 3. She was extubated yesterday. Were consulted today because of her history of recurrent ovarian cancer. Patient is mentally alert and oriented. No respiratory distress. No fever. She is not in pain. Brief oncologic history:  DIAGNOSIS:       Recurrent ovarian cancer. Original diagnosis 2005 with multiple recurrences. Diffuse metastasis. CURRENT THERAPY:         Doxil/ Avastin started 9/18/2020. Avastin was discontinued due to recent GI bleeding. Status post recent vascular embolization  S/p seizure disorder. Gemzar started 2/26/2021. Interrupted due to hospitalization and problem with compliance. Evidence of disease progression on CT scan 2/22/22  Added Carboplatin to Gemzar March 2022        BRIEF CASE HISTORY:      Ms. Mahesh Rangel is a very pleasant 59 y.o. female with history of multiple co morbidities as listed.   The patient seen in consultation for ovarian cancer with multiple recurrences. 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 She had surgical debulking and she had systemic treatment with Taxol and carboplatin for 6 cycles. Patient was in remission for about 2 years. She had a relapse in 2007 and treated with topotecan with good results. Patient relapsed again in 2008 and was treated with Taxol carboplatin. After 3 cycles of systemic chemotherapy she had problems with carboplatin so she finished 3 more cycles with Taxol. She did well again for 2 to 3 years until she had a relapse in 2012 and she had intraperitoneal chemotherapy treatment with Taxol. Patient was last seen by her oncologist in 2014 at which time she had relatively stable disease with normal tumor marker and no significant abnormal images. Patient moved to [place] after that and she was lost for oncology follow-ups. Patient was recently evaluated again here in [facility] because of abdominal discomfort. Re imaging confirmed metastatic relapse. Biopsy confirmed ovarian cancer recurrence. Scan showed extensive intraabdominal and splenic involvement and lung mets. She has no symptoms at the present time. Patient denies smoking or alcohol drinking.l       Past Medical History:   has a past medical history of Anemia, Bleeding, Cervical cancer ([address] Depression, Diabetes mellitus ([address] GERD (gastroesophageal reflux disease), Hx of blood clots, Hypertension, Metastatic cancer ([address] Ovarian cancer ([address] Post chemo evaluation, PRES (posterior reversible encephalopathy syndrome), and Splenic lesion. Past Surgical History:   has a past surgical history that includes Hysterectomy, total abdominal; Port Surgery; Tonsillectomy; IR PORT PLACEMENT > 5 YEARS (08/24/2020); Anus surgery; Abscess Drainage (2013); colectomy (03/2013); IR EMBOLIZATION HEMORRHAGE (10/05/2020); and Cardiac catheterization.    Family History: family history includes Alcohol Abuse in her mother; Cirrhosis in her mother. Social History:   reports that she has been smoking cigarettes. She has a 20.00 pack-year smoking history. She has never used smokeless tobacco. She reports that she does not currently use alcohol. She reports that she does not use drugs. Medications:    Prior to Admission medications    Medication Sig Start Date End Date Taking? Authorizing Provider   lacosamide (VIMPAT) 150 MG TABS tablet Take 1 tablet by mouth 2 times daily. 10/14/22 10/15/25 Yes ESTRELLITA Martinez CNP   levETIRAcetam (KEPPRA) 750 MG tablet Take 1 tablet by mouth 2 times daily 10/14/22  Yes ESTRELLITA Martinez CNP   OLANZapine (ZYPREXA) 5 MG tablet Take 1 tablet by mouth nightly 10/13/22  Yes Allison Sood MD   amLODIPine (NORVASC) 10 MG tablet Take 1 tablet by mouth daily 10/11/22  Yes Noris Hay MD   metoprolol tartrate (LOPRESSOR) 25 MG tablet Take 1 tablet by mouth 2 times daily 10/10/22  Yes Noris Hay MD   miconazole (ZEASORB-AF) 2 % powder Apply topically 2 times daily. 10/10/22  Yes Ac Ryder MD   STIMULANT LAXATIVE 8.6-50 MG per tablet TAKE 2 TABLETS BY MOUTH NIGHTLY. 10/6/22   Shanda Medhkour, DO   morphine (MS CONTIN) 15 MG extended release tablet TAKE 1 TABLET BY MOUTH 2 TIMES DAILY FOR 30 DAYS.  9/28/22 10/28/22  Dmitriy Morocho MD   lamoTRIgine (LAMICTAL) 25 MG tablet Take 5 tablets by mouth 2 times daily 9/22/22   Shanda Medhkour, DO   LORazepam (ATIVAN) 1 MG tablet TAKE 1 TABLET BY MOUTH EVERY 8 HOURS AS NEEDED FOR ANXIETY FOR UP TO 30 DAYS. 9/21/22 10/21/22  Demar Bhatia MD   morphine (MS CONTIN) 30 MG extended release tablet TAKE 1 TABLET BY MOUTH 2 TIMES DAILY FOR 30 DAYS. 9/21/22 10/21/22  Demar Bhtaia MD   sertraline (ZOLOFT) 100 MG tablet TAKE 1 TABLET BY MOUTH DAILY 9/21/22 10/21/22  Demar Bhatia MD   FEROSUL 325 (65 Fe) MG tablet TAKE 1 TABLET BY MOUTH ONCE DAILY WITH BREAKFAST 9/9/22   Justus SUTHERLAND MD Sriram   pantoprazole (PROTONIX) 40 MG tablet Take 1 tablet by mouth daily 9/1/22   Nestor Ano MD Sriram   Lactobacillus (ACIDOPHILUS) CAPS capsule TAKE 1 TABLET BY MOUTH ONCE DAILY IN THE MORNING AND 1 TABLET BEFORE BEDTIME 8/31/22   Shanda Hinds, DO   dicyclomine (BENTYL) 20 MG tablet TAKE 1 TABLET (20 MG TOTAL) BY MOUTH IN THE MORNING AND 1 TABLET (20 MG TOTAL) BEFORE BEDTIME. 8/29/22   Shanda Hinds, DO   Handicap Placard MISC 5 years 7/19/22   Rohan Zavalamax, DO   loperamide (IMODIUM) 2 MG capsule Take 2 mg by mouth 4 times daily as needed for Diarrhea    Historical Provider, MD   ondansetron (ZOFRAN-ODT) 4 MG disintegrating tablet Take 1 tablet by mouth every 8 hours as needed for Nausea or Vomiting 5/13/22   Jass Bales MD   senna (SENOKOT) 8.6 MG tablet Take 1 tablet by mouth 2 times daily 5/2/22 5/2/23  [facility], MD   melatonin 5 MG TABS tablet Take 1 tablet by mouth daily 4/13/22   Shanda Lucasmax DO   benzonatate (TESSALON PERLES) 100 MG capsule Take 1 capsule by mouth 3 times daily as needed for Cough 4/13/22   Shanda Zavalamax, DO   apixaban (ELIQUIS) 5 MG TABS tablet Take 1 tablet by mouth 2 times daily 4/8/22   Jass Bales MD   hydrocortisone (ANUSOL-HC) 2.5 % CREA rectal cream Apply on affected area twice daily 2/28/22   Stephan Prather MD     Current Facility-Administered Medications   Medication Dose Route Frequency Provider Last Rate Last Admin    loperamide (IMODIUM) capsule 2 mg  2 mg Oral TID PRN Kylah Bose MD   2 mg at 10/14/22 1625    OLANZapine (ZYPREXA) tablet 5 mg  5 mg Oral Nightly Kimmie Murphy MD   5 mg at 10/14/22 2050    lacosamide (VIMPAT) tablet 150 mg  150 mg Oral BID ESTRELLITA Martinez - CNP   150 mg at 10/15/22 0927    levETIRAcetam (KEPPRA) tablet 750 mg  750 mg Oral BID Tha Porras, APRN - CNP   750 mg at 10/15/22 0927    famotidine (PEPCID) tablet 20 mg  20 mg Oral Daily Lasebastiananda Po, DO   20 mg at 10/15/22 0927    amLODIPine (NORVASC) tablet 10 mg  10 mg Oral Daily Dorothea Luo MD   10 mg at 10/15/22 0927    metoprolol tartrate (LOPRESSOR) tablet 25 mg  25 mg Oral BID Dorothea Luo MD   25 mg at 10/15/22 0927    miconazole (MICOTIN) 2 % powder   Topical BID Lesa Fuentes MD   Given at 10/15/22 0934    sodium chloride flush 0.9 % injection 10 mL  10 mL IntraVENous PRN Everton Tesfaye DO        LORazepam (ATIVAN) tablet 1 mg  1 mg Oral Q4H PRN Dorothea Luo MD   1 mg at 10/14/22 2050    docusate (COLACE) 50 MG/5ML liquid 100 mg  100 mg Oral Daily Donita Gil MD   100 mg at 10/08/22 0837    apixaban (ELIQUIS) tablet 5 mg  5 mg Oral BID Angela Dao, DO   5 mg at 10/15/22 6771    lamoTRIgine (LAMICTAL) tablet 125 mg  125 mg Oral BID Angela Dao, DO   125 mg at 10/15/22 4949    oxyCODONE-acetaminophen (PERCOCET) 5-325 MG per tablet 1 tablet  1 tablet Oral Q4H PRN Angela Dao, DO   1 tablet at 10/15/22 1328    sertraline (ZOLOFT) tablet 100 mg  100 mg Oral Daily Angela Lexington, DO   100 mg at 10/15/22 0926    melatonin tablet 5 mg  5 mg Oral Nightly Angela Dao, DO   5 mg at 10/14/22 2050    morphine (PF) injection 2 mg  2 mg IntraVENous Q4H PRN Angela [place]ington, DO   2 mg at 10/05/22 0228    albuterol (PROVENTIL) nebulizer solution 2.5 mg  2.5 mg Nebulization Q6H PRN Radha Gardner MD   2.5 mg at 10/08/22 0941    racepinephrine HCl (VAPONEFPRIN) 2.25 % nebulizer solution NEBU 11.25 mg  11.25 mg Nebulization Q3H PRN Soheila Patterson MD        sodium chloride nebulizer 0.9 % solution 3 mL  3 mL Nebulization Q4H PRN Julia Patterson MD        sodium chloride flush 0.9 % injection 5-40 mL  5-40 mL IntraVENous 2 times per day Angela Dao, DO   10 mL at 10/15/22 0934    sodium chloride flush 0.9 % injection 5-40 mL  5-40 mL IntraVENous PRN Angela Dc DO        0.9 % sodium chloride infusion   IntraVENous PRN Angela Dc DO   Stopped at 10/10/22 1232    ondansetron (ZOFRAN-ODT) disintegrating tablet 4 mg  4 mg Oral Q8H PRN Lajuanda Po, DO   4 mg at 10/13/22 1932    Or    ondansetron (ZOFRAN) injection 4 mg  4 mg IntraVENous Q6H PRN Lajuanda Po, DO   4 mg at 10/12/22 1321    polyethylene glycol (GLYCOLAX) packet 17 g  17 g Oral Daily PRN Lajuanda Po, DO        acetaminophen (TYLENOL) tablet 650 mg  650 mg Oral Q6H PRN Lajuanda Po, DO   325 mg at 10/08/22 1800    Or    acetaminophen (TYLENOL) suppository 650 mg  650 mg Rectal Q6H PRN Lajuanda Po, DO           Allergies:  Carboplatin and Ceftriaxone    REVIEW OF SYSTEMS:      General: Positive for weakness and fatigue. Positive for weight loss and decreased appetite. No fever or chills. Eyes: Positive visual hallucinations of people and animals on and off. No blurred vision, eye pain or double vision. Ears: Positive auditory hallucinations. No hearing problems or drainage. No tinnitus. Throat: No sore throat, problems with swallowing or dysphagia. Respiratory: Positive SOB on exertion. No cough, sputum or hemoptysis. No pleuritic chest pain. Cardiovascular: No chest pain, orthopnea or PND. No lower extremity edema. No palpitation. Gastrointestinal: Positive diarrhea and nausea. denies blood in stool. No problems with swallowing. No abdominal pain or bloating. No constipation. No GI bleeding. Genitourinary: No dysuria, hematuria, frequency or urgency. Musculoskeletal: No muscle aches or pains. No limitation of movement. No back pain. No gait disturbance, No joint complaints. Dermatologic: No skin rashes or pruritus. No skin lesions or discolorations. Psychiatric: As above. Hematologic: No history of bleeding tendency. No bruises or ecchymosis. No history of clotting problems. Infectious disease: No fever, chills or frequent infections. Endocrine: No polydipsia or polyuria. No temperature intolerance. Neurologic: Positive dizziness. No weakness or numbness of the extremities. No changes in balance, coordination,  memory, mentation, behavior. Allergic/Immunologic: No nasal congestion or hives. No repeated infections. PHYSICAL EXAM:      BP (!) 144/66   Pulse 87   Temp 98.7 °F (37.1 °C) (Oral)   Resp 17   Ht 5' 5\" (1.651 m)   Wt 143 lb 6.4 oz (65 kg)   SpO2 96%   BMI 23.86 kg/m²    Temp (24hrs), Av.2 °F (36.8 °C), Min:97.7 °F (36.5 °C), Max:98.7 °F (37.1 °C)      General appearance - not in pain or distress  Mental status - alert and oriented  Eyes - pupils equal and reactive, extraocular eye movements intact  Ears - bilateral TM's and external ear canals normal  Nose - normal and patent, no erythema, discharge or polyps  Mouth - mucous membranes moist, pharynx normal without lesions  Neck - supple, no significant adenopathy  Lymphatics - no palpable lymphadenopathy, no hepatosplenomegaly  Chest - clear to auscultation, no wheezes, rales or rhonchi, symmetric air entry  Heart - normal rate, regular rhythm, normal S1, S2, no murmurs, rubs, clicks or gallops  Abdomen - soft, nontender, nondistended, no masses or organomegaly  Neurological - alert, oriented, normal speech, no focal findings or movement disorder noted  Musculoskeletal - no joint tenderness, deformity or swelling  Extremities - peripheral pulses normal, no pedal edema, no clubbing or cyanosis  Skin - normal coloration and turgor, no rashes, no suspicious skin lesions noted           DATA:      Labs:       CBC:   Recent Labs     10/14/22  0251 10/15/22  0650   WBC 8.4 7.3   HGB 11.4* 11.8*   HCT 35.1* 37.9   * 564*       BMP:   Recent Labs     10/14/22  0251 10/15/22  0650    142   K 3.7 3.5*   CO2 23 21   BUN 23* 18   CREATININE 1.14* 0.97*   LABGLOM 55* >60   GLUCOSE 108* 132*       PT/INR: No results for input(s): PROTIME, INR in the last 72 hours. APTT:No results for input(s): APTT in the last 72 hours. LIVER PROFILE:No results for input(s): AST, ALT, LABALBU in the last 72 hours.   MRI BRAIN W WO CONTRAST  Narrative: EXAMINATION:  MRI OF THE BRAIN WITHOUT AND WITH CONTRAST  10/9/2022 9:35 am    TECHNIQUE:  Multiplanar multisequence MRI of the head/brain was performed without and  with the administration of intravenous contrast.    COMPARISON:  Brain MRI done 10/04/2022 and 02/24/2022. HISTORY:  ORDERING SYSTEM PROVIDED HISTORY: revaluation of parenchyma eveluate for PRES  or other lesions  TECHNOLOGIST PROVIDED HISTORY:  revaluation of parenchyma eveluate for PRES or other lesions  Reason for Exam: revaluation of parenchyma, PRES or other lesions    FINDINGS:  INTRACRANIAL STRUCTURES/VENTRICLES: Patchy cortical/subcortical FLAIR  hyperintense signal in the right greater than left occipital lobes is similar  to the brain MRI done October 4, 2022 and 02/24/2022. Small focal area of  cortical/subcortical FLAIR hyperintense signal in the left posterior frontal  lobe is similar compared to brain MRI done October 4, 2022 but new compared  to brain MRI done 02/24/2022. These areas of FLAIR hyperintense signal  demonstrate no correlating postcontrast enhancement, diffusion restriction or  gradient blooming. There is no acute infarct. No mass effect or midline shift. No evidence of an  acute intracranial hemorrhage. Generalized cerebral and cerebellar volume  loss. No deonte hydrocephalus. The sellar/suprasellar regions appear  unremarkable. The normal signal voids within the major intracranial vessels  appear maintained. No abnormal focus of enhancement is seen within the  brain. The axial FLAIR images demonstrate mild bilateral periventricular and  deep white matter signal hyperintensities which are nonspecific but commonly  seen in the setting of chronic small vessel ischemic disease. ORBITS: The visualized portion of the orbits demonstrate no acute abnormality. SINUSES: The visualized paranasal sinuses and mastoid air cells demonstrate  no acute abnormality.     BONES/SOFT TISSUES: The bone marrow signal intensity appears normal. The soft  tissues demonstrate no acute abnormality. Impression: 1. Overall, findings are similar compared to brain MRI done October 4, 2022  with redemonstration of patchy areas of FLAIR hyperintense signal in the left  posterior frontal lobe and right greater than left occipital lobes which may  represent small old infarcts. 2.  No acute stroke, intracranial hemorrhage or abnormal enhancement. 3.  Mild chronic small vessel ischemic disease. IMPRESSION:    Primary Problem  Septicemia [facility])    Active Hospital Problems    Diagnosis Date Noted    Septicemia ([address] [A41.9] 10/03/2022     Priority: High    Hallucinations [R44.3] 10/14/2022     Priority: Medium    Metabolic encephalopathy [F97.81]     Breakthrough seizure ([address] Charlie Kevin     AMS (altered mental status) [R41.82] 02/20/2022    Gastroesophageal reflux disease [K21.9] 01/08/2022    Iron deficiency anemia secondary to inadequate dietary iron intake [D50.8] 05/28/2021    Chronic deep vein thrombosis (DVT) of femoral vein of left lower extremity ([address] [I82.512] 03/11/2021    Cancer, metastatic to bone ([address] [C79.51] 01/28/2021    Seizure disorder, status epilepticus, nonconvulsive ([address] [G40.901] 01/04/2021    Acute encephalopathy [G93.40]     Seizure ([address] [R56.9] 10/21/2020    Malignant neoplasm of ovary ([address] [C56.9] 08/17/2020   Altered mental status. Sepsis. Recurrent metastatic ovarian cancer on chemotherapy treatment. RECOMMENDATIONS:  Records and labs and images were reviewed and discussed with the patient. Patient was treated for sepsis and she was extubated successfully. She had metabolic encephalopathy which has resolved. MRI findings similar to previous MRI. Patient is currently on active chemotherapy treatment for ovarian cancer. Last treatment was September 29, 2022. Further management for ovarian cancer will be as outpatient.   Patient's questions were answered to the best of her satisfaction and she verbalized full understanding and agreement. Thank you for allowing us to participate in the care of this pleasant patient. Patient thinks symptoms aggravated/provoked after chemotherapy gemcitabine> gemcitabine can cause encephalopathy(confusion headache lethargy visual and neurologic disturbance) but unlikely isolated hallucination    Discussed with patient and Nurse. [facility]                            This note is created with the assistance of a speech recognition program.  While intending to generate a document that actually reflects the content of the visit, the document can still have some errors including those of syntax and sound a like substitutions which may escape proof reading. It such instances, actual meaning can be extrapolated by contextual diversion.

## 2022-10-15 NOTE — CARE COORDINATION
Transitional planning:  [facility] inquiring if they received authorization for admission. She gave me Jeferson's e-mail at [email or web address] to find out. Sent e-mail to [place]. Awaiting answer. Left [facility] call back number, too.     [facility], pts daughter approached [facility] and states he mother did not walk 30 feet both ways yesterday outside of pt's room. She was with her mother. She wants to know why that was written in her chart. Reviewed PT notes, and it states 60 feet RW with CG A. Called PT and asked them to meet with pt and family to discuss. Left  at 21  with [facility] call back number. 1500 Spoke with physical therapist. She states she ambulated the pt yesterday and she also spoke with the daughter.

## 2022-10-15 NOTE — PLAN OF CARE
Problem: Respiratory - Adult  Goal: Achieves optimal ventilation and oxygenation  Outcome: Progressing     Problem: Discharge Planning  Goal: Discharge to home or other facility with appropriate resources  Outcome: Progressing     Problem: Safety - Adult  Goal: Free from fall injury  Outcome: Progressing     Problem: ABCDS Injury Assessment  Goal: Absence of physical injury  Outcome: Progressing     Problem: Skin/Tissue Integrity  Goal: Absence of new skin breakdown  Description: 1. Monitor for areas of redness and/or skin breakdown  2. Assess vascular access sites hourly  3. Every 4-6 hours minimum:  Change oxygen saturation probe site  4. Every 4-6 hours:  If on nasal continuous positive airway pressure, respiratory therapy assess nares and determine need for appliance change or resting period.   Outcome: Progressing     Problem: Pain  Goal: Verbalizes/displays adequate comfort level or baseline comfort level  Outcome: Progressing     Problem: Chronic Conditions and Co-morbidities  Goal: Patient's chronic conditions and co-morbidity symptoms are monitored and maintained or improved  Outcome: Progressing     Problem: Nutrition Deficit:  Goal: Optimize nutritional status  Outcome: Progressing

## 2022-10-15 NOTE — PROGRESS NOTES
Physical Therapy        Physical Therapy Cancel Note      DATE: 10/15/2022    NAME: Juany Carr  MRN: [id number]   : 1965      Patient not seen this date for Physical Therapy due to:    Patient Declined: \"stating not today, I am having back pain today\". Daughter in room. Got a call from CM stating pt daughter reported that mother did not work with therapy yesterday and she was with mom all day. Pt reports to both RN and my self that she did ambulate with PT yesterday and performed therapeutic exercises. That therapy also assisted her to rest room. Writer performed treatment session yesterday and pts daughter was not in room throughout session.        Electronically signed by Rukhsana Major PTA on 10/15/2022 at 3:38 PM

## 2022-10-16 LAB
ABSOLUTE EOS #: 0.31 K/UL (ref 0–0.44)
ABSOLUTE IMMATURE GRANULOCYTE: 0.03 K/UL (ref 0–0.3)
ABSOLUTE LYMPH #: 1.06 K/UL (ref 1.1–3.7)
ABSOLUTE MONO #: 0.83 K/UL (ref 0.1–1.2)
ANION GAP SERPL CALCULATED.3IONS-SCNC: 10 MMOL/L (ref 9–17)
BASOPHILS # BLD: 1 % (ref 0–2)
BASOPHILS ABSOLUTE: 0.03 K/UL (ref 0–0.2)
BUN BLDV-MCNC: 16 MG/DL (ref 6–20)
CALCIUM SERPL-MCNC: 8.9 MG/DL (ref 8.6–10.4)
CHLORIDE BLD-SCNC: 105 MMOL/L (ref 98–107)
CO2: 25 MMOL/L (ref 20–31)
CREAT SERPL-MCNC: 0.85 MG/DL (ref 0.5–0.9)
EOSINOPHILS RELATIVE PERCENT: 5 % (ref 1–4)
GFR SERPL CREATININE-BSD FRML MDRD: >60 ML/MIN/1.73M2
GLUCOSE BLD-MCNC: 92 MG/DL (ref 70–99)
HCT VFR BLD CALC: 37.3 % (ref 36.3–47.1)
HEMOGLOBIN: 11.8 G/DL (ref 11.9–15.1)
IMMATURE GRANULOCYTES: 1 %
LYMPHOCYTES # BLD: 17 % (ref 24–43)
MCH RBC QN AUTO: 29.8 PG (ref 25.2–33.5)
MCHC RBC AUTO-ENTMCNC: 31.6 G/DL (ref 28.4–34.8)
MCV RBC AUTO: 94.2 FL (ref 82.6–102.9)
MONOCYTES # BLD: 13 % (ref 3–12)
NRBC AUTOMATED: 0 PER 100 WBC
PDW BLD-RTO: 17.1 % (ref 11.8–14.4)
PLATELET # BLD: 542 K/UL (ref 138–453)
PMV BLD AUTO: 9.1 FL (ref 8.1–13.5)
POTASSIUM SERPL-SCNC: 3.9 MMOL/L (ref 3.7–5.3)
RBC # BLD: 3.96 M/UL (ref 3.95–5.11)
RBC # BLD: ABNORMAL 10*6/UL
SEG NEUTROPHILS: 63 % (ref 36–65)
SEGMENTED NEUTROPHILS ABSOLUTE COUNT: 4.02 K/UL (ref 1.5–8.1)
SODIUM BLD-SCNC: 140 MMOL/L (ref 135–144)
WBC # BLD: 6.3 K/UL (ref 3.5–11.3)

## 2022-10-16 PROCEDURE — 2580000003 HC RX 258: Performed by: STUDENT IN AN ORGANIZED HEALTH CARE EDUCATION/TRAINING PROGRAM

## 2022-10-16 PROCEDURE — 80048 BASIC METABOLIC PNL TOTAL CA: CPT

## 2022-10-16 PROCEDURE — 36415 COLL VENOUS BLD VENIPUNCTURE: CPT

## 2022-10-16 PROCEDURE — 97110 THERAPEUTIC EXERCISES: CPT

## 2022-10-16 PROCEDURE — 99232 SBSQ HOSP IP/OBS MODERATE 35: CPT | Performed by: INTERNAL MEDICINE

## 2022-10-16 PROCEDURE — 97116 GAIT TRAINING THERAPY: CPT

## 2022-10-16 PROCEDURE — 99232 SBSQ HOSP IP/OBS MODERATE 35: CPT | Performed by: PSYCHIATRY & NEUROLOGY

## 2022-10-16 PROCEDURE — 6370000000 HC RX 637 (ALT 250 FOR IP): Performed by: STUDENT IN AN ORGANIZED HEALTH CARE EDUCATION/TRAINING PROGRAM

## 2022-10-16 PROCEDURE — 2060000000 HC ICU INTERMEDIATE R&B

## 2022-10-16 PROCEDURE — 6370000000 HC RX 637 (ALT 250 FOR IP): Performed by: NURSE PRACTITIONER

## 2022-10-16 PROCEDURE — 6370000000 HC RX 637 (ALT 250 FOR IP)

## 2022-10-16 PROCEDURE — 6370000000 HC RX 637 (ALT 250 FOR IP): Performed by: INTERNAL MEDICINE

## 2022-10-16 PROCEDURE — 6370000000 HC RX 637 (ALT 250 FOR IP): Performed by: PSYCHIATRY & NEUROLOGY

## 2022-10-16 PROCEDURE — 85025 COMPLETE CBC W/AUTO DIFF WBC: CPT

## 2022-10-16 RX ORDER — FAMOTIDINE 20 MG/1
20 TABLET, FILM COATED ORAL 2 TIMES DAILY
Status: DISCONTINUED | OUTPATIENT
Start: 2022-10-16 | End: 2022-10-17

## 2022-10-16 RX ADMIN — METOPROLOL TARTRATE 25 MG: 25 TABLET ORAL at 20:00

## 2022-10-16 RX ADMIN — OXYCODONE HYDROCHLORIDE AND ACETAMINOPHEN 1 TABLET: 5; 325 TABLET ORAL at 20:00

## 2022-10-16 RX ADMIN — LORAZEPAM 1 MG: 1 TABLET ORAL at 17:30

## 2022-10-16 RX ADMIN — APIXABAN 5 MG: 5 TABLET, FILM COATED ORAL at 08:48

## 2022-10-16 RX ADMIN — ANTI-FUNGAL POWDER MICONAZOLE NITRATE TALC FREE: 1.42 POWDER TOPICAL at 08:49

## 2022-10-16 RX ADMIN — Medication 5 MG: at 20:00

## 2022-10-16 RX ADMIN — LEVETIRACETAM 750 MG: 500 TABLET, FILM COATED ORAL at 08:48

## 2022-10-16 RX ADMIN — SODIUM CHLORIDE, PRESERVATIVE FREE 10 ML: 5 INJECTION INTRAVENOUS at 08:49

## 2022-10-16 RX ADMIN — FAMOTIDINE 20 MG: 20 TABLET, FILM COATED ORAL at 08:48

## 2022-10-16 RX ADMIN — LORAZEPAM 1 MG: 1 TABLET ORAL at 01:59

## 2022-10-16 RX ADMIN — ONDANSETRON 4 MG: 4 TABLET, ORALLY DISINTEGRATING ORAL at 20:00

## 2022-10-16 RX ADMIN — AMLODIPINE BESYLATE 10 MG: 10 TABLET ORAL at 08:49

## 2022-10-16 RX ADMIN — LAMOTRIGINE 125 MG: 100 TABLET ORAL at 08:48

## 2022-10-16 RX ADMIN — LOPERAMIDE HYDROCHLORIDE 2 MG: 2 CAPSULE ORAL at 20:00

## 2022-10-16 RX ADMIN — LORAZEPAM 1 MG: 1 TABLET ORAL at 23:26

## 2022-10-16 RX ADMIN — LOPERAMIDE HYDROCHLORIDE 2 MG: 2 CAPSULE ORAL at 09:28

## 2022-10-16 RX ADMIN — METOPROLOL TARTRATE 25 MG: 25 TABLET ORAL at 08:48

## 2022-10-16 RX ADMIN — LACOSAMIDE 150 MG: 100 TABLET, FILM COATED ORAL at 20:00

## 2022-10-16 RX ADMIN — FAMOTIDINE 20 MG: 20 TABLET, FILM COATED ORAL at 20:00

## 2022-10-16 RX ADMIN — LEVETIRACETAM 750 MG: 500 TABLET, FILM COATED ORAL at 20:03

## 2022-10-16 RX ADMIN — LACOSAMIDE 150 MG: 100 TABLET, FILM COATED ORAL at 08:48

## 2022-10-16 RX ADMIN — LAMOTRIGINE 125 MG: 100 TABLET ORAL at 20:00

## 2022-10-16 RX ADMIN — ANTI-FUNGAL POWDER MICONAZOLE NITRATE TALC FREE: 1.42 POWDER TOPICAL at 20:03

## 2022-10-16 RX ADMIN — OLANZAPINE 5 MG: 5 TABLET, FILM COATED ORAL at 20:00

## 2022-10-16 RX ADMIN — APIXABAN 5 MG: 5 TABLET, FILM COATED ORAL at 20:00

## 2022-10-16 RX ADMIN — OXYCODONE HYDROCHLORIDE AND ACETAMINOPHEN 1 TABLET: 5; 325 TABLET ORAL at 15:58

## 2022-10-16 RX ADMIN — SERTRALINE 100 MG: 50 TABLET, FILM COATED ORAL at 08:48

## 2022-10-16 ASSESSMENT — PAIN SCALES - GENERAL
PAINLEVEL_OUTOF10: 1
PAINLEVEL_OUTOF10: 1
PAINLEVEL_OUTOF10: 3
PAINLEVEL_OUTOF10: 7
PAINLEVEL_OUTOF10: 0
PAINLEVEL_OUTOF10: 4
PAINLEVEL_OUTOF10: 7
PAINLEVEL_OUTOF10: 2

## 2022-10-16 ASSESSMENT — PAIN DESCRIPTION - LOCATION
LOCATION: BACK
LOCATION: ABDOMEN;BACK

## 2022-10-16 ASSESSMENT — PAIN DESCRIPTION - ORIENTATION
ORIENTATION: RIGHT;LEFT
ORIENTATION: MID

## 2022-10-16 ASSESSMENT — PAIN DESCRIPTION - DESCRIPTORS
DESCRIPTORS: ACHING;SHARP
DESCRIPTORS: ACHING

## 2022-10-16 ASSESSMENT — ENCOUNTER SYMPTOMS: NAUSEA: 0

## 2022-10-16 NOTE — PROGRESS NOTES
NEUROLOGY INPATIENT PROGRESS NOTE    10/16/2022         Subjective:   Christina Borden is a  59 y.o. female admitted on 10/3/2022 with Septicemia ([facility] [address] [A41.9]  Altered mental status, unspecified altered mental status type [R41.82]  AMS (altered mental status) [R41.82]    Interval History:  Briefly, this is a  59 y.o. female with history of PRES with resulting seizure disorder since 10/2020 and recurrent ovarian CA admitted on 10/3/2022 with AMS. Pt found to have multiple subclinical seizures, AED regimen adjusted, post ictal encephalitis improved but Neurology was re consulted due to ongoing hallucinations. Today, pt was seen and examined. Reports hallucinations are becoming less frequent and feels overall better. Characteristic of hallucinations unchanged from prior. No new complaints. No overnight events. ROS:    Review of Systems   Eyes:  Negative for visual disturbance. Gastrointestinal:  Negative for nausea. Neurological:  Positive for weakness (generalized). Negative for dizziness, seizures, syncope, speech difficulty, light-headedness, numbness and headaches. Psychiatric/Behavioral:  Positive for hallucinations. Negative for behavioral problems, confusion, self-injury, sleep disturbance and suicidal ideas. History of Present Illness: The patient is a 59 y.o. female with significant past medical history of recurrent ovarian cancer (initial diagnosis in 2005, follows with Dr. Eva Rainey) with intra-abdominal mets and lymphedema, currently undergoing chemotherapy, recent active intra-abdominal bleeding due to splenic mass with GI infiltration, prior PRES with subsequent seizure disorder on keppra and lamictal who presents with acute onset altered mentation. Per daughter Arianna Delgado, pt's other daughter noticed pt vomiting on her bed through camera in pt's room and pt thereafter reported to be incoherent and not answering questions appropriately, and behaving in an erratic way.  Patient has chronic constipation. Per family, pt recently lost both of her sons and has missed the last few chemotherapy sessions 2/2 depression. Regarding seizure history,  Pt had new onset seizures (generalized tonic-clonic seizures witnessed at [address] [facility]) at age 60 in October 2020; at that time,  LTME showed multiple focal subclinical seizures starting at left posterior quadrant,  MRI brain showed flair hyperintensities in bilateral occipital regions, left parietal, and left frontal lobe with questionable restricted diffusion shaded with those lesions,  Seizures were felt to be 2/2 PRES likely 2/2 hypertensive spikes and underlying immunosuppression 2/2 pt's CA and associated treatments & pt was started on AED (Vimpat). Since then, pt has had multiple hospitalizations for breakthrough seizures with workup negative for any intracerebral metastasis and with appropriate adjustments to AEDs made. Most recently, pt's medications adjusted to Keppra 1 g BID and Lamictal 125 mg BID. Previous Workup:  LTME 10/24/2020: multiple subclinical seizures originating over L posterior quadrant and spreading across L hemisphere, diffuse left hemispheric sharp wave discharges   LTME 01/04/2021: Diffuse polymorphic theta slowing and occasional left hemispheric superimposed delta slowing suggesting moderate encephalopathy and underlying neuronal dysfunction. No epileptiform activity. LTME 6/19/2021: Diffuse theta slowing, periods of diffuse attenuation, no epileptiform activity. LTME 2/23/2022: Diffuse theta slowing, and left occipital temporal lateralized periodic discharges with underlying rhythmicity suggesting increased risk for focal onset seizures  CSF analysis 10/2020: WBC, 0 RBC, total protein 25.4, glucose 78. Negative meningitis encephalitis panel. Negative cryptococcal antigen.   MRI Brain w w/o contrast 2/24/2022: No evidence of metastatic disease, residual mild T2 hyperintensities in the occipital region bilaterally consistent with PRESS. Significant improvement compared to 10/2020  CT Head 10/3/2022: no acute intracranial abnormality  CTA H/N 10/3/2022: no evidence for sig stenosis or occlusion      No current facility-administered medications on file prior to encounter. Current Outpatient Medications on File Prior to Encounter   Medication Sig Dispense Refill    morphine (MS CONTIN) 15 MG extended release tablet TAKE 1 TABLET BY MOUTH 2 TIMES DAILY FOR 30 DAYS. 60 tablet 0    lamoTRIgine (LAMICTAL) 25 MG tablet Take 5 tablets by mouth 2 times daily 300 tablet 2    LORazepam (ATIVAN) 1 MG tablet TAKE 1 TABLET BY MOUTH EVERY 8 HOURS AS NEEDED FOR ANXIETY FOR UP TO 30 DAYS. 90 tablet 0    morphine (MS CONTIN) 30 MG extended release tablet TAKE 1 TABLET BY MOUTH 2 TIMES DAILY FOR 30 DAYS. 60 tablet 0    sertraline (ZOLOFT) 100 MG tablet TAKE 1 TABLET BY MOUTH DAILY 30 tablet 4    FEROSUL 325 (65 Fe) MG tablet TAKE 1 TABLET BY MOUTH ONCE DAILY WITH BREAKFAST 30 tablet 2    pantoprazole (PROTONIX) 40 MG tablet Take 1 tablet by mouth daily 30 tablet 3    Lactobacillus (ACIDOPHILUS) CAPS capsule TAKE 1 TABLET BY MOUTH ONCE DAILY IN THE MORNING AND 1 TABLET BEFORE BEDTIME 60 capsule 5    dicyclomine (BENTYL) 20 MG tablet TAKE 1 TABLET (20 MG TOTAL) BY MOUTH IN THE MORNING AND 1 TABLET (20 MG TOTAL) BEFORE BEDTIME.  60 tablet 3    Handicap Placard MISC 5 years 1 each 0    loperamide (IMODIUM) 2 MG capsule Take 2 mg by mouth 4 times daily as needed for Diarrhea      ondansetron (ZOFRAN-ODT) 4 MG disintegrating tablet Take 1 tablet by mouth every 8 hours as needed for Nausea or Vomiting 60 tablet 2    senna (SENOKOT) 8.6 MG tablet Take 1 tablet by mouth 2 times daily 60 tablet 11    melatonin 5 MG TABS tablet Take 1 tablet by mouth daily 30 tablet 3    benzonatate (TESSALON PERLES) 100 MG capsule Take 1 capsule by mouth 3 times daily as needed for Cough 30 capsule 1    apixaban (ELIQUIS) 5 MG TABS tablet Take 1 tablet by mouth 2 times daily 60 tablet 5    hydrocortisone (ANUSOL-HC) 2.5 % CREA rectal cream Apply on affected area twice daily 1 each 1       Past Medical History:   Diagnosis Date    Anemia     Bleeding 10/2020    intra-abdominal bleeding -due to splenic mass with GI infiltration. Status post embolization    Cervical cancer (HCC)     Depression     Diabetes mellitus ([facility] [address]     GERD (gastroesophageal reflux disease)     Hx of blood clots     Hypertension     Metastatic cancer ([facility] [address] 10/2020    extensive intraabdominal and splenic involvement and lung mets. [facility] ([facility]     low grade serous ovarian carcinoma    Post chemo evaluation     2007: Chemo via med onc (Dr. Bharti Gray), 2008: Ramses Ang due to rising CA-125, 2013: intraperitoneal chemo,12/2015: Ca125 - 25     PRES (posterior reversible encephalopathy syndrome)     Splenic lesion        Past Surgical History:   Procedure Laterality Date    ABSCESS DRAINAGE  2013    Franca rectal    ANUS SURGERY      ANAL FISSURECTOMY    CARDIAC CATHETERIZATION      COLECTOMY  03/2013    ex lap, tumor debulking, transverse colectomy w reanastamosis, subgastric omentectomy, intraperitoneal port placement    HYSTERECTOMY, TOTAL ABDOMINAL (CERVIX REMOVED)      IR EMBOLIZATION HEMORRHAGE  10/05/2020    intra-abdominal bleeding -due to splenic mass with GI infiltration. Status post embolization [employer] interlock coils x7. mri condtional 3t ok, safe immediately post implant. IR PORT PLACEMENT EQUAL OR GREATER THAN 5 YEARS  08/24/2020    IR PORT PLACEMENT EQUAL OR GREATER THAN 5 YEARS 8/24/2020 Tanya Magallanes MD [facility] SPECIAL PROCEDURES    PORT SURGERY      IP Port    TONSILLECTOMY         Allergies: Alison Adkins is allergic to carboplatin and ceftriaxone.     Objective:     Medications:     OLANZapine  5 mg Oral Nightly    lacosamide  150 mg Oral BID    levETIRAcetam  750 mg Oral BID    famotidine  20 mg Oral Daily    amLODIPine  10 mg Oral Daily    metoprolol tartrate  25 mg Oral BID    miconazole   Topical BID    docusate  100 mg Oral Daily    apixaban  5 mg Oral BID    lamoTRIgine  125 mg Oral BID    sertraline  100 mg Oral Daily    melatonin  5 mg Oral Nightly    sodium chloride flush  5-40 mL IntraVENous 2 times per day     PRN Meds include: loperamide, sodium chloride flush, LORazepam, oxyCODONE-acetaminophen, morphine, albuterol, racepinephrine HCl, sodium chloride nebulizer, sodium chloride flush, sodium chloride, ondansetron **OR** ondansetron, polyethylene glycol, acetaminophen **OR** acetaminophen    NEUROLOGICAL EXAMINATION  /67   Pulse 96   Temp 98 °F (36.7 °C) (Oral)   Resp 16   Ht 5' 5\" (1.651 m)   Wt 143 lb 12.8 oz (65.2 kg)   SpO2 96%   BMI 23.93 kg/m²     Blood pressure range: Systolic (70YIF), HGM:763 , Min:124 , MEC:601   ; Diastolic (58VAK), VUS:42, Min:65, Max:82    Vitals:    10/16/22 0400 10/16/22 0546 10/16/22 0754 10/16/22 0848   BP: 124/78  135/67 135/67   Pulse: 62  94 96   Resp: 14  16    Temp: 98.4 °F (36.9 °C)  98 °F (36.7 °C)    TempSrc: Oral  Oral    SpO2: 97%  96%    Weight:  143 lb 12.8 oz (65.2 kg)     Height:           Physical Exam  Constitutional:       General: She is not in acute distress. Appearance: Normal appearance. She is normal weight. She is not ill-appearing, toxic-appearing or diaphoretic. HENT:      Head: Normocephalic. Cardiovascular:      Rate and Rhythm: Normal rate and regular rhythm. Pulmonary:      Effort: Pulmonary effort is normal.   Neurological:      Mental Status: She is alert.          MENTAL STATUS:  Alert, orientedx3, intact memory, no confusion, normal speech, normal language, no dysarthria, able to spell WORLD backwards, able to appropriately name objects, good short term recall   CRANIAL NERVES: II     -      Visual fields intact to confrontation  III,IV,VI -  PERR, EOMs intact, no ptosis  V     -     Normal facial sensation   VII    -     Normal facial symmetry  VIII   -     Intact hearing IX,X -     Symmetrical palate  XI    -     Symmetrical shoulder shrug  XII   -     Midline tongue, no atrophy    MOTOR FUNCTION: RUE: Significant for good strength of grade 5/5 in proximal and distal muscle groups   LUE: Significant for good strength of grade 5/5 in proximal and distal muscle groups   RLE: Significant for good strength of grade 5/5 in proximal and distal muscle groups   LLE: Significant for good strength of grade 5/5 in proximal and distal muscle groups      Normal bulk, normal tone and no involuntary movements, no tremor   SENSORY FUNCTION:  Normal touch x4 limbs   CEREBELLAR FUNCTION:  Intact fine motor control over upper limbs    REFLEX FUNCTION:  Symmetric in upper and lower extremities   STATION and GAIT  Not tested     Data:    Lab Results:   CBC:   Recent Labs     10/14/22  0251 10/15/22  0650 10/16/22  0709   WBC 8.4 7.3 6.3   HGB 11.4* 11.8* 11.8*   * 564* 542*       BMP:    Recent Labs     10/14/22  0251 10/15/22  0650 10/16/22  0709    142 140   K 3.7 3.5* 3.9    103 105   CO2 23 21 25   BUN 23* 18 16   CREATININE 1.14* 0.97* 0.85   GLUCOSE 108* 132* 92           Lab Results   Component Value Date    CHOL 131 03/10/2021    LDLCHOLESTEROL 69 03/10/2021    HDL 33 (L) 03/10/2021    TRIG 147 03/10/2021    ALT 9 10/03/2022    AST 18 10/03/2022    TSH 2.93 03/09/2021    INR 1.2 05/03/2022    LABA1C 5.2 04/13/2022    [id number] 654 06/04/2022       No results found for: PHENYTOIN, PHENYTOIN, VALPROATE, CBMZ    IMAGING  MRI BRAIN W WO CONTRAST    Result Date: 10/9/2022  1. Overall, findings are similar compared to brain MRI done October 4, 2022 with redemonstration of patchy areas of FLAIR hyperintense signal in the left posterior frontal lobe and right greater than left occipital lobes which may represent small old infarcts. 2.  No acute stroke, intracranial hemorrhage or abnormal enhancement. 3.  Mild chronic small vessel ischemic disease.          Assessment and Plan  Case of a 59 y.o. female with significant past medical history of recurrent ovarian cancer (initial diagnosis in 2005, follows with Dr. Wallace Guevara) with peritoneal and lung metastasis, lymphedema, currently undergoing chemotherapy, recent active intra-abdominal bleeding due to splenic mass with GI infiltration s/p embolization, prior PRES with subsequent seizure disorder (onset 10/2020) on keppra and lamictal who was admitted on 10/03/2022 for management of acute onset altered mentation initially requiring intubation for airway protection. Pt was febrile and with GCS 8 on initial presentation. Neurology team consulted for ongoing confusion/disorientation/agitation after extubation on 10/04/2022. Pt was continued on 1 g Keppra bid, 126 mg Lamictal bid, and Vimpat 100 mg BID was added. Pt also started on Seroquel 25 mg bid for insomnia and hallucinations. Pt mentation improved over time and Neuro team signed off 10/11/2022. Neuro team reconsulted 10/13 due to persistence of hallucinations. CT Head negative for acute intracranial abnormality. CTA H/N negative for stenosis or occlusion. MRI brain (10/4)-prior infarcts versus mild press?   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 twice daily   Vimpat increased to 150 mg twice daily   Lamictal 125 mg twice daily  Psychiatry following; Seroquel switched to Zyprexa  No new cerebral infarctions but multiple old ones shown on repeat brain MRI        Manuel Fontana DO  Neurology Resident PGY-2  10/16/2022  8:58 AM

## 2022-10-16 NOTE — PLAN OF CARE
Notified on-call resident for Dr. Kate Longoria of the following via secure message: K 3.5. Patient able to take oral medication. ... Also, patient has several blisters noted on her buttocks, pink buttocks and perineum area . Had pt take off brief. Antifungal powder applied.

## 2022-10-16 NOTE — PROGRESS NOTES
Physical Therapy  Facility/Department: [address] STEPDOWN  Daily treatment note    Name: Boogie Bains  : 1965  MRN: [id number]  Date of Service: 10/16/2022    Discharge Recommendations:  Patient would benefit from continued therapy after discharge   PT Equipment Recommendations  Equipment Needed: Yes  Mobility Devices: Sandy Mimes: Rolling      Patient Diagnosis(es): The primary encounter diagnosis was Septicemia ([facility] [address] Diagnoses of Altered mental status, unspecified altered mental status type, Metabolic encephalopathy, Seizure ([address] Seizure disorder ([address] Acute respiratory failure with hypoxia ([address] and Breakthrough seizure ([facility] [address] were also pertinent to this visit. Past Medical History:  has a past medical history of Anemia, Bleeding, Cervical cancer ([facility] [address] Depression, Diabetes mellitus ([address] GERD (gastroesophageal reflux disease), Hx of blood clots, Hypertension, Metastatic cancer ([facility] [address] Ovarian cancer ([facility] [address] Post chemo evaluation, PRES (posterior reversible encephalopathy syndrome), and Splenic lesion. Past Surgical History:  has a past surgical history that includes Hysterectomy, total abdominal; Port Surgery; Tonsillectomy; IR PORT PLACEMENT > 5 YEARS (2020); Anus surgery; Abscess Drainage (); colectomy (2013); IR EMBOLIZATION HEMORRHAGE (10/05/2020); and Cardiac catheterization. Assessment   Body Structures, Functions, Activity Limitations Requiring Skilled Therapeutic Intervention: Decreased functional mobility ; Decreased strength;Decreased cognition;Decreased safe awareness;Decreased endurance;Decreased balance  Assessment: Pt amb 100 RW x3  with CG A, Pt continue to be impulsive and unsafe amb without assist. Pt lacks insight to deficits making her a high fall risk. Recomending continued therapy to address deficits. Pt will require 24hrs supervision and assistance with mobility upon discahrge.   Therapy Prognosis: Good  Requires PT Follow-Up: Yes  Activity Tolerance  Activity Tolerance: Patient tolerated treatment well;Patient limited by fatigue;Patient limited by endurance     Plan   Physcial Therapy Plan  General Plan:  (5-6x/wk)  Current Treatment Recommendations: Strengthening, Balance training, Functional mobility training, Transfer training, Patient/Caregiver education & training, Safety education & training, Home exercise program, Equipment evaluation, education, & procurement, Therapeutic activities, Endurance training, Gait training, Stair training  Safety Devices  Type of Devices: Gait belt, Left in bed, Call light within reach, Bed alarm in place, Patient at risk for falls, Nurse notified  Restraints  Restraints Initially in Place: No  Restraints: 4 bed rails d/t seizure precautions     Restrictions  Restrictions/Precautions  Restrictions/Precautions: General Precautions, Up as Tolerated, Fall Risk  Required Braces or Orthoses?: No  Position Activity Restriction  Other position/activity restrictions: LTME, extubated 10/4     Subjective   General  Patient assessed for rehabilitation services?: Yes  Response To Previous Treatment: Patient with no complaints from previous session. Family / Caregiver Present: No  Follows Commands: Within Functional Limits  Subjective  Subjective: Pt supine in bed and agreeable to therapy, Pt denies pain at rest..Pt became very emotional toward the end of session , she reports feeling very depress at memory of her two sons that passed away last september. emtional supoort provided with good return. RN notified   Cognition   Orientation  Overall Orientation Status: Within Normal Limits  Orientation Level: Oriented to time;Oriented to place;Oriented to situation;Oriented to person  Cognition  Overall Cognitive Status: Exceptions  Arousal/Alertness: Appropriate responses to stimuli  Following Commands: Follows one step commands consistently; Follows multistep commands with increased time; Follows multistep commands with repitition  Attention Span: Attends with cues to redirect  Memory: Decreased recall of biographical Information;Decreased recall of recent events  Safety Judgement: Decreased awareness of need for assistance;Decreased awareness of need for safety  Problem Solving: Decreased awareness of errors;Assistance required to identify errors made;Assistance required to generate solutions  Insights: Decreased awareness of deficits  Initiation: Requires cues for some  Sequencing: Requires cues for some     Objective      Bed mobility  Supine to Sit: Contact guard assistance  Sit to Supine: Stand by assistance  Scooting: Stand by assistance  Bed Mobility Comments: Pt continue to be impulsive and unsafe making her a fall risk. Transfers  Sit to Stand: Contact guard assistance  Stand to Sit: Stand by assistance  Bed to Chair: Stand by assistance (to commode)  Comment: Transfer with RW. Ambulation  Surface: Level tile  Device: Rolling Walker  Assistance: Contact guard assistance  Gait Deviations: Slow Cynthia;Decreased step length  Distance: 100ft x3  Comments: demo decrease safety awareness t/o  easily distracted and lacks insight to deficits  More Ambulation?: No     Balance  Posture: Fair  Sitting - Static: Fair;+  Standing - Static: Fair;-  Comments: Standing with HHA x5 mins . Exercise Treatment: Ankle pumps, heel slide, short arc quads, straight leg raises. Reps x10      AM-PAC Score  AM-PAC Inpatient Mobility Raw Score : 18 (10/08/22 1217)  AM-PAC Inpatient T-Scale Score : 43.63 (10/08/22 1217)  Mobility Inpatient CMS 0-100% Score: 46.58 (10/08/22 1217)  Mobility Inpatient CMS G-Code Modifier : CK (10/08/22 1217)   Goals  Short Term Goals  Time Frame for Short Term Goals: 14 visits  Short Term Goal 1: Pt will perform sit<>stand transfer with SBA. Short Term Goal 2: Pt will ambulate 125 feet with least restrictive device and SBA to increase functional independence.   Short Term Goal 3: Pt will demonstrate fair+ standing balance to decrease fall risk.  Short Term Goal 4: Pt will tolerate a 35 minute therapy session to promote increased endurance. Education  Patient Education  Education Given To: Patient  Education Provided: Role of Therapy;Transfer Training;Equipment;Plan of Care; Fall Prevention Strategies  Education Method: Verbal  Barriers to Learning: Cognition  Education Outcome: Continued education needed;Demonstrated understanding      Therapy Time   Individual Concurrent Group Co-treatment   Time In 1210         Time Out 1245         Minutes 35         Timed Code Treatment Minutes: [address] [facility]

## 2022-10-16 NOTE — PLAN OF CARE
Problem: Respiratory - Adult  Goal: Achieves optimal ventilation and oxygenation  10/16/2022 0540 by Marty Paula RN  Outcome: Progressing  10/15/2022 1554 by Barney Byers RN  Outcome: Progressing     Problem: Discharge Planning  Goal: Discharge to home or other facility with appropriate resources  10/16/2022 0540 by Marty Paula RN  Outcome: Progressing  10/15/2022 1554 by Barney Byers RN  Outcome: Progressing     Problem: Safety - Adult  Goal: Free from fall injury  10/16/2022 0540 by Marty Paula RN  Outcome: Progressing  Flowsheets (Taken 10/15/2022 1950)  Free From Fall Injury: Instruct family/caregiver on patient safety  10/15/2022 1554 by Barney Byers RN  Outcome: Progressing     Problem: ABCDS Injury Assessment  Goal: Absence of physical injury  10/16/2022 0540 by Marty Paula RN  Outcome: Progressing  Flowsheets (Taken 10/15/2022 1950)  Absence of Physical Injury: Implement safety measures based on patient assessment  10/15/2022 1554 by Barney Byers RN  Outcome: Progressing     Problem: Skin/Tissue Integrity  Goal: Absence of new skin breakdown  Description: 1. Monitor for areas of redness and/or skin breakdown  2. Assess vascular access sites hourly  3. Every 4-6 hours minimum:  Change oxygen saturation probe site  4. Every 4-6 hours:  If on nasal continuous positive airway pressure, respiratory therapy assess nares and determine need for appliance change or resting period.   10/16/2022 0540 by Marty Paula RN  Outcome: Progressing  10/15/2022 1554 by Barney Byers RN  Outcome: Progressing     Problem: Pain  Goal: Verbalizes/displays adequate comfort level or baseline comfort level  10/16/2022 0540 by Marty Paula RN  Outcome: Progressing  10/15/2022 1554 by Barney Byers RN  Outcome: Progressing     Problem: Chronic Conditions and Co-morbidities  Goal: Patient's chronic conditions and co-morbidity symptoms are monitored and maintained or improved  10/16/2022 0540 by Marty Paula RN  Outcome: Progressing  10/15/2022 1554 by Matteo Case RN  Outcome: Progressing     Problem: Nutrition Deficit:  Goal: Optimize nutritional status  10/16/2022 0540 by Liliane Flores RN  Outcome: Progressing  10/15/2022 1554 by Matteo Case RN  Outcome: Progressing

## 2022-10-16 NOTE — PLAN OF CARE
Problem: Respiratory - Adult  Goal: Achieves optimal ventilation and oxygenation  10/16/2022 1909 by Lauren Krishnan RN  Outcome: Progressing  10/16/2022 0540 by Krishan Benavidez RN  Outcome: Progressing     Problem: Discharge Planning  Goal: Discharge to home or other facility with appropriate resources  10/16/2022 1909 by Lauren Krishnan RN  Outcome: Progressing  10/16/2022 0540 by Krishan Benavidez RN  Outcome: Progressing     Problem: Safety - Adult  Goal: Free from fall injury  10/16/2022 1909 by Lauren Krishnan RN  Outcome: Progressing  10/16/2022 0540 by Krishan Benavidez RN  Outcome: Progressing  Flowsheets (Taken 10/15/2022 1950)  Free From Fall Injury: Instruct family/caregiver on patient safety     Problem: ABCDS Injury Assessment  Goal: Absence of physical injury  10/16/2022 1909 by Lauren Krishnan RN  Outcome: Progressing  10/16/2022 0540 by Krishan Benavidez RN  Outcome: Progressing  Flowsheets (Taken 10/15/2022 1950)  Absence of Physical Injury: Implement safety measures based on patient assessment     Problem: Skin/Tissue Integrity  Goal: Absence of new skin breakdown  Description: 1. Monitor for areas of redness and/or skin breakdown  2. Assess vascular access sites hourly  3. Every 4-6 hours minimum:  Change oxygen saturation probe site  4. Every 4-6 hours:  If on nasal continuous positive airway pressure, respiratory therapy assess nares and determine need for appliance change or resting period.   10/16/2022 1909 by Lauren Krishnan RN  Outcome: Progressing  10/16/2022 0540 by Krishan Benavidez RN  Outcome: Progressing     Problem: Pain  Goal: Verbalizes/displays adequate comfort level or baseline comfort level  10/16/2022 1909 by Lauren Krishnan RN  Outcome: Progressing  10/16/2022 0540 by Krishan Benavidez RN  Outcome: Progressing     Problem: Chronic Conditions and Co-morbidities  Goal: Patient's chronic conditions and co-morbidity symptoms are monitored and maintained or improved  10/16/2022 1909 by Lauren Krishnan RN  Outcome: Progressing  10/16/2022 0540 by Thelma Mcfadden RN  Outcome: Progressing     Problem: Nutrition Deficit:  Goal: Optimize nutritional status  10/16/2022 1909 by Alden Umanzor RN  Outcome: Progressing  10/16/2022 0540 by Thelma Mcfadden RN  Outcome: Progressing

## 2022-10-16 NOTE — PROGRESS NOTES
Pharmacy Note     Renal Dose Adjustment    Tyler Girard is a 59 y.o. female. Pharmacist assessment of renally cleared medications. Recent Labs     10/15/22  0650 10/16/22  0709   BUN 18 16       Recent Labs     10/15/22  0650 10/16/22  0709   CREATININE 0.97* 0.85       Estimated Creatinine Clearance: 64 mL/min (based on SCr of 0.85 mg/dL).     Height:   Ht Readings from Last 1 Encounters:   10/13/22 5' 5\" (1.651 m)     Weight:  Wt Readings from Last 1 Encounters:   10/16/22 143 lb 12.8 oz (65.2 kg)       The following medication dose has been adjusted based upon renal function per P&T Guidelines:             Famotidine 20 mg daily changed to 20 mg BID    Tamy PeaceD, BCCCP  10/16/2022  2:20 PM

## 2022-10-16 NOTE — PROGRESS NOTES
[facility]  Internal Medicine Teaching Residency Program  Inpatient Daily Progress Note  ______________________________________________________________________________    Patient: Emi Gautam  YOB: 1965   [id number]    Acct: [de-identified]     Room: [address] [place] [address]  Admit date: 10/3/2022  Today's date: 10/16/22  Number of days in the hospital: 13    SUBJECTIVE   Admitting Diagnosis: Septicemia ([address]  CC: AMS    Pt examined at bedside. Chart & results reviewed. No acute events overnight  Afebrile, hemodynamically stable, maintaining her saturations  No acute complaints  Reports that auditory hallucinations are getting less frequent  Neurology input appreciated  Advised to work with PT/OT  Medically stable for discharge, awaiting pre-CERT for SNF        ROS:  Constitutional:  negative for chills, fevers, sweats  Respiratory:  negative for cough, dyspnea on exertion, hemoptysis, shortness of breath, wheezing  Cardiovascular:  negative for chest pain, chest pressure/discomfort, lower extremity edema, palpitations  Gastrointestinal:  negative for abdominal pain, constipation, diarrhea, nausea, vomiting  Neurological:  negative for dizziness, headache  BRIEF HISTORY     67-year-old female presented with chief complaints of altered mental status and ED. Was found drowsy by daughter on 10/2/2022. She was brought in by EMS with altered mental status opening eyes to painful stimuli and low GCS. In the ER she was meeting SIRS criteria and decision was made to intubate the patient for airway protection. Before intubation Narcan was also tried as there was a suspicion for acute toxic metabolic encephalopathy due to prescription opioid use. She was transferred to ICU for further management. In the ICU initially after intubation the patient was combative and not intolerant of the ET tube so she was sedated with propofol.   Also started on IV fluids for ongoing hypotension. She has known history of seizures and was on antiseizure medications at home. CT scan chest showed bilateral pulmonary infiltrates concerning for aspiration pneumonia and she was started on broad-spectrum antibiotics. Neurology was also consulted for seizure work-up. 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 She has hx of intra-abdominal bleeding secondary to splenic mass with GI infiltration s/p embolization of the spleen. 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 They recommend stent placement if patient goes into complete lower bowel obstruction. OBJECTIVE     Vital Signs:  /78   Pulse 62   Temp 98.4 °F (36.9 °C) (Oral)   Resp 14   Ht 5' 5\" (1.651 m)   Wt 143 lb 12.8 oz (65.2 kg)   SpO2 97%   BMI 23.93 kg/m²     Temp (24hrs), Av.4 °F (36.9 °C), Min:98.1 °F (36.7 °C), Max:98.7 °F (37.1 °C)    In: -   Out: 300 [Urine:300]    Physical Exam:  Constitutional: This is a well developed, well nourished, 18.5-24.9 - Normal 59y.o. year old female who is alert, oriented, cooperative and in no apparent distress. Head:normocephalic and atraumatic. Respiratory:  Breath sounds bilaterally were clear to auscultation. There were no wheezes, rhonchi or rales. There is no intercostal retraction or use of accessory muscles. Cardiovascular: Regular without murmur, clicks, gallops or rubs. Abdomen: Slightly rounded and soft without organomegaly. No rebound, rigidity or guarding was appreciated. Musculoskeletal: No gross muscle weakness. Extremities:  No lower extremity edema, ulcerations, tenderness, varicosities or erythema. Muscle size, tone and strength are normal.  No involuntary movements are noted. Skin:  Warm and dry. Good color, turgor and pigmentation.  No lesions or scars. No cyanosis or clubbing  Neurological/Psychiatric: The patient's general behavior, level of consciousness, thought content and emotional status is normal.        Medications:  Scheduled Medications:    OLANZapine  5 mg Oral Nightly    lacosamide  150 mg Oral BID    levETIRAcetam  750 mg Oral BID    famotidine  20 mg Oral Daily    amLODIPine  10 mg Oral Daily    metoprolol tartrate  25 mg Oral BID    miconazole   Topical BID    docusate  100 mg Oral Daily    apixaban  5 mg Oral BID    lamoTRIgine  125 mg Oral BID    sertraline  100 mg Oral Daily    melatonin  5 mg Oral Nightly    sodium chloride flush  5-40 mL IntraVENous 2 times per day     Continuous Infusions:    sodium chloride Stopped (10/10/22 1232)     PRN Medicationsloperamide, 2 mg, TID PRN  sodium chloride flush, 10 mL, PRN  LORazepam, 1 mg, Q4H PRN  oxyCODONE-acetaminophen, 1 tablet, Q4H PRN  morphine, 2 mg, Q4H PRN  albuterol, 2.5 mg, Q6H PRN  racepinephrine HCl, 11.25 mg, Q3H PRN  sodium chloride nebulizer, 3 mL, Q4H PRN  sodium chloride flush, 5-40 mL, PRN  sodium chloride, , PRN  ondansetron, 4 mg, Q8H PRN   Or  ondansetron, 4 mg, Q6H PRN  polyethylene glycol, 17 g, Daily PRN  acetaminophen, 650 mg, Q6H PRN   Or  acetaminophen, 650 mg, Q6H PRN        Diagnostic Labs:  CBC:   Recent Labs     10/14/22  0251 10/15/22  0650   WBC 8.4 7.3   RBC 3.78* 4.00   HGB 11.4* 11.8*   HCT 35.1* 37.9   MCV 92.9 94.8   RDW 17.2* 17.2*   * 564*     BMP:   Recent Labs     10/14/22  0251 10/15/22  0650    142   K 3.7 3.5*    103   CO2 23 21   BUN 23* 18   CREATININE 1.14* 0.97*     BNP: No results for input(s): BNP in the last 72 hours. PT/INR: No results for input(s): PROTIME, INR in the last 72 hours. APTT: No results for input(s): APTT in the last 72 hours. CARDIAC ENZYMES: No results for input(s): CKMB, CKMBINDEX, TROPONINI in the last 72 hours.     Invalid input(s): CKTOTAL;3  FASTING LIPID PANEL:  Lab Results   Component Value Date CHOL 131 03/10/2021    HDL 33 (L) 03/10/2021    TRIG 147 03/10/2021     LIVER PROFILE: No results for input(s): AST, ALT, ALB, BILIDIR, BILITOT, ALKPHOS in the last 72 hours. MICROBIOLOGY:   Lab Results   Component Value Date/Time    CULTURE NO GROWTH 5 DAYS 10/03/2022 02:44 PM       Imaging:    MRI BRAIN W WO CONTRAST    Result Date: 10/9/2022  1. Overall, findings are similar compared to brain MRI done October 4, 2022 with redemonstration of patchy areas of FLAIR hyperintense signal in the left posterior frontal lobe and right greater than left occipital lobes which may represent small old infarcts. 2.  No acute stroke, intracranial hemorrhage or abnormal enhancement. 3.  Mild chronic small vessel ischemic disease. ASSESSMENT & PLAN     ASSESSMENT / PLAN:     Principal Problem:    Septicemia ([address]  Active Problems:    Hallucinations    Malignant neoplasm of ovary (HCC)    Seizure ([address]    Acute encephalopathy    Seizure disorder, status epilepticus, nonconvulsive ([address]    Cancer, metastatic to bone ([address]    Chronic deep vein thrombosis (DVT) of femoral vein of left lower extremity (HCC)    Iron deficiency anemia secondary to inadequate dietary iron intake    AMS (altered mental status)    Gastroesophageal reflux disease    Metabolic encephalopathy    Breakthrough seizure ([address]  Resolved Problems:    Type 2 diabetes mellitus without complication, without long-term current use of insulin ([address]     Relapsed Ovarian carcinoma with metastasis  -Diagnosed 2005 with intraperitoneal carcinomatosis s/p surgical debulking and systemic chemotherapy. - Relapse in last relapse in 2014, lost to follow-up since  - Metastatic relapse again, biopsy confirmed ovarian cancer recurrence with intra-abdominal and splenic involvement as well as lung metastasis  - Currently on active chemotherapy last treatment on 09/29/2022, hematology/oncology continues to follow the patient.   - Further cancer treatment outpatient with oncology. Subclinical seizures- controlled  - Neurology recommends continuing Keppra 750 mg twice daily, Vimpat 150 mg twice daily and Lamictal 125 mg twice daily.  - MRI brain shows no acute infarct or hemorrhage and stable small foci of right occipital encephalomalacia.  -Follow-up MRI brain is stable compared to prior image, no obvious evidence of PRES, no evidence of metastatic lesions to the brain.  - Seizures well-controlled last seizure 9/06  - Outpatient follow-up with neurology in 4 to 6 weeks. Aspiration pneumonia-resolved  - Possibly secondary to altered mentation and acute metabolic encephalopathy  - Completed course of levaquin  - Supplemental oxygen therapy as needed. - Continue nebulizer treatments and incentive spirometry  - Aspiration precautions     Primary hypertension  - Started on Norvasc 10 mg daily, blood pressure controlled  - Monitor blood pressure    History of DVT/PE  - Continue anticoagulation with Eliquis 5 mg twice daily    Depression and hallucinations  - Discontinued Seroquel. Started olanzapine per psychiatry  -Continue Zoloft    DVT ppx : Eliquis  GI ppx: Pepcid    PT/OT: Consulted  Discharge Planning / SW:  consulted    Tommy Dewey MD  Internal Medicine Resident, PGY-1  [address] [employer], [place]  10/16/2022, 6:17 AM

## 2022-10-17 VITALS
DIASTOLIC BLOOD PRESSURE: 71 MMHG | BODY MASS INDEX: 23.86 KG/M2 | TEMPERATURE: 98 F | OXYGEN SATURATION: 90 % | RESPIRATION RATE: 18 BRPM | SYSTOLIC BLOOD PRESSURE: 136 MMHG | WEIGHT: 143.2 LBS | HEIGHT: 65 IN | HEART RATE: 95 BPM

## 2022-10-17 LAB
ABSOLUTE EOS #: 0.28 K/UL (ref 0–0.44)
ABSOLUTE IMMATURE GRANULOCYTE: 0.05 K/UL (ref 0–0.3)
ABSOLUTE LYMPH #: 1.34 K/UL (ref 1.1–3.7)
ABSOLUTE MONO #: 1.11 K/UL (ref 0.1–1.2)
ANION GAP SERPL CALCULATED.3IONS-SCNC: 12 MMOL/L (ref 9–17)
BASOPHILS # BLD: 1 % (ref 0–2)
BASOPHILS ABSOLUTE: 0.06 K/UL (ref 0–0.2)
BUN BLDV-MCNC: 23 MG/DL (ref 6–20)
CALCIUM SERPL-MCNC: 9.1 MG/DL (ref 8.6–10.4)
CHLORIDE BLD-SCNC: 102 MMOL/L (ref 98–107)
CO2: 24 MMOL/L (ref 20–31)
CREAT SERPL-MCNC: 1.08 MG/DL (ref 0.5–0.9)
EOSINOPHILS RELATIVE PERCENT: 4 % (ref 1–4)
GFR SERPL CREATININE-BSD FRML MDRD: 59 ML/MIN/1.73M2
GLUCOSE BLD-MCNC: 109 MG/DL (ref 70–99)
HCT VFR BLD CALC: 37.2 % (ref 36.3–47.1)
HEMOGLOBIN: 12 G/DL (ref 11.9–15.1)
IMMATURE GRANULOCYTES: 1 %
LYMPHOCYTES # BLD: 18 % (ref 24–43)
MCH RBC QN AUTO: 30.4 PG (ref 25.2–33.5)
MCHC RBC AUTO-ENTMCNC: 32.3 G/DL (ref 28.4–34.8)
MCV RBC AUTO: 94.2 FL (ref 82.6–102.9)
MONOCYTES # BLD: 15 % (ref 3–12)
NRBC AUTOMATED: 0 PER 100 WBC
PDW BLD-RTO: 17.3 % (ref 11.8–14.4)
PLATELET # BLD: 643 K/UL (ref 138–453)
PMV BLD AUTO: 9.3 FL (ref 8.1–13.5)
POTASSIUM SERPL-SCNC: 3.7 MMOL/L (ref 3.7–5.3)
RBC # BLD: 3.95 M/UL (ref 3.95–5.11)
RBC # BLD: ABNORMAL 10*6/UL
SEG NEUTROPHILS: 61 % (ref 36–65)
SEGMENTED NEUTROPHILS ABSOLUTE COUNT: 4.72 K/UL (ref 1.5–8.1)
SODIUM BLD-SCNC: 138 MMOL/L (ref 135–144)
WBC # BLD: 7.6 K/UL (ref 3.5–11.3)

## 2022-10-17 PROCEDURE — 6370000000 HC RX 637 (ALT 250 FOR IP)

## 2022-10-17 PROCEDURE — 99232 SBSQ HOSP IP/OBS MODERATE 35: CPT | Performed by: INTERNAL MEDICINE

## 2022-10-17 PROCEDURE — 6370000000 HC RX 637 (ALT 250 FOR IP): Performed by: NURSE PRACTITIONER

## 2022-10-17 PROCEDURE — 6370000000 HC RX 637 (ALT 250 FOR IP): Performed by: INTERNAL MEDICINE

## 2022-10-17 PROCEDURE — 86255 FLUORESCENT ANTIBODY SCREEN: CPT

## 2022-10-17 PROCEDURE — 99233 SBSQ HOSP IP/OBS HIGH 50: CPT | Performed by: PSYCHIATRY & NEUROLOGY

## 2022-10-17 PROCEDURE — 36415 COLL VENOUS BLD VENIPUNCTURE: CPT

## 2022-10-17 PROCEDURE — 80048 BASIC METABOLIC PNL TOTAL CA: CPT

## 2022-10-17 PROCEDURE — 6370000000 HC RX 637 (ALT 250 FOR IP): Performed by: STUDENT IN AN ORGANIZED HEALTH CARE EDUCATION/TRAINING PROGRAM

## 2022-10-17 PROCEDURE — 85025 COMPLETE CBC W/AUTO DIFF WBC: CPT

## 2022-10-17 PROCEDURE — 97530 THERAPEUTIC ACTIVITIES: CPT

## 2022-10-17 RX ORDER — BENZONATATE 100 MG/1
100 CAPSULE ORAL 3 TIMES DAILY PRN
Status: DISCONTINUED | OUTPATIENT
Start: 2022-10-17 | End: 2022-10-17 | Stop reason: HOSPADM

## 2022-10-17 RX ORDER — FAMOTIDINE 20 MG/1
20 TABLET, FILM COATED ORAL DAILY
Status: DISCONTINUED | OUTPATIENT
Start: 2022-10-18 | End: 2022-10-17 | Stop reason: HOSPADM

## 2022-10-17 RX ADMIN — LACOSAMIDE 150 MG: 100 TABLET, FILM COATED ORAL at 08:36

## 2022-10-17 RX ADMIN — FAMOTIDINE 20 MG: 20 TABLET, FILM COATED ORAL at 08:36

## 2022-10-17 RX ADMIN — APIXABAN 5 MG: 5 TABLET, FILM COATED ORAL at 08:36

## 2022-10-17 RX ADMIN — OXYCODONE HYDROCHLORIDE AND ACETAMINOPHEN 1 TABLET: 5; 325 TABLET ORAL at 10:41

## 2022-10-17 RX ADMIN — LEVETIRACETAM 750 MG: 500 TABLET, FILM COATED ORAL at 08:36

## 2022-10-17 RX ADMIN — METOPROLOL TARTRATE 25 MG: 25 TABLET ORAL at 08:36

## 2022-10-17 RX ADMIN — LORAZEPAM 1 MG: 1 TABLET ORAL at 04:48

## 2022-10-17 RX ADMIN — SERTRALINE 100 MG: 50 TABLET, FILM COATED ORAL at 08:36

## 2022-10-17 RX ADMIN — ANTI-FUNGAL POWDER MICONAZOLE NITRATE TALC FREE: 1.42 POWDER TOPICAL at 08:37

## 2022-10-17 RX ADMIN — AMLODIPINE BESYLATE 10 MG: 10 TABLET ORAL at 08:36

## 2022-10-17 RX ADMIN — LAMOTRIGINE 125 MG: 100 TABLET ORAL at 08:36

## 2022-10-17 ASSESSMENT — PAIN DESCRIPTION - ORIENTATION: ORIENTATION: LOWER

## 2022-10-17 ASSESSMENT — PAIN SCALES - GENERAL
PAINLEVEL_OUTOF10: 0
PAINLEVEL_OUTOF10: 7

## 2022-10-17 ASSESSMENT — PAIN DESCRIPTION - DESCRIPTORS: DESCRIPTORS: ACHING

## 2022-10-17 ASSESSMENT — PAIN DESCRIPTION - LOCATION: LOCATION: BACK

## 2022-10-17 NOTE — PROGRESS NOTES
[facility]  Internal Medicine Teaching Residency Program  Inpatient Daily Progress Note  ______________________________________________________________________________    Patient: Sanjuanita Gardner [facility]  YOB: 1965   LLJ:[id number]    Acct: [de-identified]     Room: [facility]0140-  Admit date: 10/3/2022  Today's date: 10/17/22  Number of days in the hospital: 14    SUBJECTIVE   Admitting Diagnosis: Septicemia ([address]  CC: AMS    Patient examined at bedside. Chart and results reviewed. No acute events overnight. Hemodynamically stable, afebrile and maintaining saturation on 1 L of oxygen via nasal cannula. She is complaining of cough this morning. She denies chest pain, sore throat, headache and fevers. On examination chest is clear, no wheezing or crackles appreciated. Patient has incentive spirometer and encouraged to use it more frequently. She will follow outpatient with oncology after discharge. Pending placement to SNF.    ROS:  Constitutional:  negative for chills, fevers, sweats  Respiratory:  negative for cough, dyspnea on exertion, hemoptysis, shortness of breath, wheezing  Cardiovascular:  negative for chest pain, chest pressure/discomfort, lower extremity edema, palpitations  Gastrointestinal:  negative for abdominal pain, constipation, diarrhea, nausea, vomiting  Neurological:  negative for dizziness, headache  BRIEF HISTORY     61-year-old female presented with chief complaints of altered mental status and ED. Was found drowsy by daughter on 10/2/2022. She was brought in by EMS with altered mental status opening eyes to painful stimuli and low GCS. In the ER she was meeting SIRS criteria and decision was made to intubate the patient for airway protection. Before intubation Narcan was also tried as there was a suspicion for acute toxic metabolic encephalopathy due to prescription opioid use. She was transferred to ICU for further management. In the ICU initially after intubation the patient was combative and not intolerant of the ET tube so she was sedated with propofol. Also started on IV fluids for ongoing hypotension. She has known history of seizures and was on antiseizure medications at home. CT scan chest showed bilateral pulmonary infiltrates concerning for aspiration pneumonia and she was started on broad-spectrum antibiotics. Neurology was also consulted for seizure work-up. 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 She has hx of intra-abdominal bleeding secondary to splenic mass with GI infiltration s/p embolization of the spleen. 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 They recommend stent placement if patient goes into complete lower bowel obstruction. OBJECTIVE     Vital Signs:  BP (!) 142/74   Pulse 98   Temp 98.6 °F (37 °C) (Oral)   Resp 25   Ht 5' 5\" (1.651 m)   Wt 143 lb 3.2 oz (65 kg)   SpO2 94%   BMI 23.83 kg/m²     Temp (24hrs), Av.3 °F (36.8 °C), Min:98 °F (36.7 °C), Max:98.6 °F (37 °C)    In: 840   Out: 250 [Urine:250]    Physical Exam:  Constitutional: This is a well developed, well nourished, 18.5-24.9 - Normal 59y.o. year old female who is alert, oriented, cooperative and in no apparent distress. Head:normocephalic and atraumatic. Respiratory:  Breath sounds bilaterally were clear to auscultation. There were no wheezes, rhonchi or rales. There is no intercostal retraction or use of accessory muscles. Cardiovascular: Regular without murmur, clicks, gallops or rubs. Abdomen: Slightly rounded and soft without organomegaly. No rebound, rigidity or guarding was appreciated. Musculoskeletal: No gross muscle weakness.     Extremities:  No lower extremity edema, ulcerations, tenderness, varicosities or erythema. Muscle size, tone and strength are normal.  No involuntary movements are noted. Skin:  Warm and dry. Good color, turgor and pigmentation. No lesions or scars.   No cyanosis or clubbing  Neurological/Psychiatric: The patient's general behavior, level of consciousness, thought content and emotional status is normal.        Medications:  Scheduled Medications:    [START ON 10/18/2022] famotidine  20 mg Oral Daily    OLANZapine  5 mg Oral Nightly    lacosamide  150 mg Oral BID    levETIRAcetam  750 mg Oral BID    amLODIPine  10 mg Oral Daily    metoprolol tartrate  25 mg Oral BID    miconazole   Topical BID    docusate  100 mg Oral Daily    apixaban  5 mg Oral BID    lamoTRIgine  125 mg Oral BID    sertraline  100 mg Oral Daily    melatonin  5 mg Oral Nightly    sodium chloride flush  5-40 mL IntraVENous 2 times per day     Continuous Infusions:    sodium chloride Stopped (10/10/22 1232)     PRN Medicationsbenzonatate, 100 mg, TID PRN  loperamide, 2 mg, TID PRN  sodium chloride flush, 10 mL, PRN  LORazepam, 1 mg, Q4H PRN  oxyCODONE-acetaminophen, 1 tablet, Q4H PRN  morphine, 2 mg, Q4H PRN  albuterol, 2.5 mg, Q6H PRN  racepinephrine HCl, 11.25 mg, Q3H PRN  sodium chloride nebulizer, 3 mL, Q4H PRN  sodium chloride flush, 5-40 mL, PRN  sodium chloride, , PRN  ondansetron, 4 mg, Q8H PRN   Or  ondansetron, 4 mg, Q6H PRN  polyethylene glycol, 17 g, Daily PRN  acetaminophen, 650 mg, Q6H PRN   Or  acetaminophen, 650 mg, Q6H PRN      Diagnostic Labs:  CBC:   Recent Labs     10/15/22  0650 10/16/22  0709 10/17/22  0439   WBC 7.3 6.3 7.6   RBC 4.00 3.96 3.95   HGB 11.8* 11.8* 12.0   HCT 37.9 37.3 37.2   MCV 94.8 94.2 94.2   RDW 17.2* 17.1* 17.3*   * 542* 643*       BMP:   Recent Labs     10/15/22  0650 10/16/22  0709 10/17/22  0439    140 138   K 3.5* 3.9 3.7    105 102   CO2 21 25 24   BUN 18 16 23*   CREATININE 0.97* 0.85 1.08*       BNP: No results for input(s): BNP in the last 72 hours.  PT/INR: No results for input(s): PROTIME, INR in the last 72 hours. APTT: No results for input(s): APTT in the last 72 hours. CARDIAC ENZYMES: No results for input(s): CKMB, CKMBINDEX, TROPONINI in the last 72 hours. Invalid input(s): CKTOTAL;3  FASTING LIPID PANEL:  Lab Results   Component Value Date    CHOL 131 03/10/2021    HDL 33 (L) 03/10/2021    TRIG 147 03/10/2021     LIVER PROFILE: No results for input(s): AST, ALT, ALB, BILIDIR, BILITOT, ALKPHOS in the last 72 hours. MICROBIOLOGY:   Lab Results   Component Value Date/Time    CULTURE NO GROWTH 5 DAYS 10/03/2022 02:44 PM       Imaging:    MRI BRAIN W WO CONTRAST    Result Date: 10/9/2022  1. Overall, findings are similar compared to brain MRI done October 4, 2022 with redemonstration of patchy areas of FLAIR hyperintense signal in the left posterior frontal lobe and right greater than left occipital lobes which may represent small old infarcts. 2.  No acute stroke, intracranial hemorrhage or abnormal enhancement. 3.  Mild chronic small vessel ischemic disease. 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 - Relapse in last relapse in 2014, lost to follow-up since  - Metastatic relapse again, biopsy confirmed ovarian cancer recurrence with intra-abdominal and splenic involvement as well as lung metastasis  - Currently on active chemotherapy last treatment on 09/29/2022, hematology/oncology continues to follow the patient. - Further cancer treatment outpatient with oncology.      Subclinical seizures- controlled  - Neurology recommends continuing Keppra 750 mg twice daily, Vimpat 150 mg twice daily and Lamictal 125 mg twice daily.  - MRI brain shows no acute infarct or hemorrhage and stable small foci of right occipital encephalomalacia.  - Seizures well-controlled last seizure 9/06  - Outpatient follow-up with neurology in 4 to 6 weeks. Aspiration pneumonia-resolved  - Possibly secondary to altered mentation and acute metabolic encephalopathy  - Completed course of levaquin  - Supplemental oxygen therapy as needed. - Continue nebulizer treatments and incentive spirometry  - Aspiration precautions     Primary hypertension  - Started on Norvasc 10 mg daily, blood pressure controlled  - Monitor blood pressure    History of DVT/PE  - Continue anticoagulation with Eliquis 5 mg twice daily    Depression and hallucinations  - Discontinued Seroquel. Started olanzapine per psychiatry  -Continue Zoloft    DVT ppx : Eliquis  GI ppx: Pepcid    PT/OT: Consulted  Discharge Planning / SW:  assist with discharge Aliya Willis MD  Internal Medicine Resident, PGY-1  [facility];  [place], [place]  10/17/2022, 11:36 AM

## 2022-10-17 NOTE — PLAN OF CARE
Problem: Respiratory - Adult  Goal: Achieves optimal ventilation and oxygenation  10/17/2022 0506 by Romayne Mutters, RN  Outcome: Progressing  10/16/2022 1909 by Chris Mosquera RN  Outcome: Progressing     Problem: Discharge Planning  Goal: Discharge to home or other facility with appropriate resources  10/17/2022 0506 by Romayne Mutters, RN  Outcome: Progressing  10/16/2022 1909 by Chris Mosquera RN  Outcome: Progressing     Problem: Safety - Adult  Goal: Free from fall injury  10/17/2022 0506 by Romayne Mutters, RN  Outcome: Progressing  Flowsheets (Taken 10/16/2022 1933)  Free From Fall Injury: Instruct family/caregiver on patient safety  10/16/2022 1909 by Chris Mosquera RN  Outcome: Progressing     Problem: ABCDS Injury Assessment  Goal: Absence of physical injury  10/17/2022 0506 by Romayne Mutters, RN  Outcome: Progressing  Flowsheets (Taken 10/16/2022 1933)  Absence of Physical Injury: Implement safety measures based on patient assessment  10/16/2022 1909 by Chris Mosquera RN  Outcome: Progressing     Problem: Skin/Tissue Integrity  Goal: Absence of new skin breakdown  Description: 1. Monitor for areas of redness and/or skin breakdown  2. Assess vascular access sites hourly  3. Every 4-6 hours minimum:  Change oxygen saturation probe site  4. Every 4-6 hours:  If on nasal continuous positive airway pressure, respiratory therapy assess nares and determine need for appliance change or resting period.   10/17/2022 0506 by Romayne Mutters, RN  Outcome: Progressing  10/16/2022 1909 by Chris Mosquera RN  Outcome: Progressing     Problem: Pain  Goal: Verbalizes/displays adequate comfort level or baseline comfort level  10/17/2022 0506 by Romayne Mutters, RN  Outcome: Progressing  10/16/2022 1909 by Chris Mosquera RN  Outcome: Progressing     Problem: Chronic Conditions and Co-morbidities  Goal: Patient's chronic conditions and co-morbidity symptoms are monitored and maintained or improved  10/17/2022 0506 by Romayne Mutters, RN  Outcome: Progressing  10/16/2022 1909 by Fanta Boucher RN  Outcome: Progressing     Problem: Nutrition Deficit:  Goal: Optimize nutritional status  10/17/2022 0506 by Tony Ewing RN  Outcome: Progressing  10/16/2022 1909 by Fanta Boucher RN  Outcome: Progressing

## 2022-10-17 NOTE — PROGRESS NOTES
Physical Therapy  Facility/Department: [address] STEPDOWN  Daily Treatment Note  NAME: Tyra Weeks  : 1965  MRN: [id number]    Date of Service: 10/17/2022    Discharge Recommendations:  Patient would benefit from continued therapy after discharge   PT Equipment Recommendations  Equipment Needed: No (defer equipment recommendations to rehab facility; pt states he normally doesn't use a device)    Patient Diagnosis(es): The primary encounter diagnosis was Septicemia ([facility] [address] Diagnoses of Altered mental status, unspecified altered mental status type, Metabolic encephalopathy, Seizure ([facility] [address] Seizure disorder ([facility] [address] Acute respiratory failure with hypoxia ([facility] [address] and Breakthrough seizure ([facility] [address] were also pertinent to this visit. Assessment   Pt cooperative, impulsive, unsteady on her feet without rwalker; impulsive and unsafe on stairs. Activity Tolerance: Patient tolerated treatment well  Equipment Needed: No (defer equipment recommendations to rehab facility; pt states he normally doesn't use a device)     Plan    Physcial Therapy Plan  General Plan:  (5-6 visits weekly)  Current Treatment Recommendations: Strengthening;Balance training;Functional mobility training;Transfer training;Patient/Caregiver education & training; Safety education & training;Home exercise program;Equipment evaluation, education, & procurement; Therapeutic activities; Endurance training;Gait training;Stair training  PT Plan of Care: Daily     Restrictions  Restrictions/Precautions  Restrictions/Precautions: General Precautions, Up as Tolerated, Fall Risk  Required Braces or Orthoses?: No  Position Activity Restriction  Other position/activity restrictions: LTME, extubated 10/4     Subjective    Subjective  Pain: /10 back pain  Orientation  Overall Orientation Status: Within Functional Limits  Orientation Level: Oriented to time;Oriented to place;Oriented to situation;Oriented to person  Cognition  Overall Cognitive Status: Exceptions  Arousal/Alertness: Appropriate responses to stimuli  Following Commands: Follows multistep commands with increased time  Attention Span: Attends with cues to redirect  Safety Judgement: Decreased awareness of need for assistance;Decreased awareness of need for safety  Problem Solving: Decreased awareness of errors;Assistance required to identify errors made;Assistance required to generate solutions  Insights: Decreased awareness of deficits  Initiation: Requires cues for some  Sequencing: Requires cues for all  Cognition Comment: pt continues to be impulsive, but appears improved compared to last PT note     Objective   Bed Mobility Training  Bed Mobility Training: Yes  Overall Level of Assistance: Supervision (moves quickly and doesn't pay attention to lines)  Rolling: Supervision  Supine to Sit: Supervision  Scooting: Supervision  Balance  Sitting: Without support  Standing: With support  Transfer Training  Transfer Training: Yes  Overall Level of Assistance: Stand-by assistance  Interventions: Safety awareness training;Verbal cues  Sit to Stand: Stand-by assistance  Stand to Sit: Stand-by assistance  Stand Pivot Transfers: Stand-by assistance  Bed to Chair: Stand-by assistance  Toilet Transfer: Stand-by assistance (x2)  Gait Training  Gait Training: Yes  Gait  Overall Level of Assistance: Contact-guard assistance  Interventions: Safety awareness training;Verbal cues  Speed/Cynthia: Accelerated  Gait Abnormalities: Path deviations  Distance (ft): 100 Feet  Rail Use: Both  Stairs - Level of Assistance: Minimum assistance  Number of Stairs Trained: 5  [place] - Level of Assistance: Minimum assistance     PT Exercises  A/AROM Exercises: AROM x 4     Safety Devices  Type of Devices: Gait belt;Call light within reach; Bed alarm in place; Patient at risk for falls;Nurse notified (pt left on toilet with call light; RN notified. Pt wanted to stay on toilet \"10 minutes or so\". )  Restraints  Restraints Initially in Place: No       Goals  Short Term Goals  Time Frame for Short Term Goals: 14 visits  Short Term Goal 1: Pt will perform sit<>stand transfer with SBA. Short Term Goal 2: Pt will ambulate 125 feet with least restrictive device and SBA to increase functional independence. Short Term Goal 3: Pt will demonstrate fair+ standing balance to decrease fall risk. Short Term Goal 4: Pt will tolerate a 35 minute therapy session to promote increased endurance. Education  Patient Education  Education Given To: Patient  Education Provided: Role of Therapy;Transfer Training;Plan of Care; Fall Prevention Strategies;Precautions  Education Method: Verbal  Barriers to Learning: Cognition  Education Outcome: Continued education needed;Verbalized understanding    Therapy Time   Individual Concurrent Group Co-treatment   Time In 6584         Time Out 1101         Minutes 38         Timed Code Treatment Minutes: [address] PT

## 2022-10-17 NOTE — PROGRESS NOTES
Pharmacy Note     Renal Dose Adjustment    Eyad Haile is a 59 y.o. female. Pharmacist assessment of renally cleared medications. Recent Labs     10/16/22  0709 10/17/22  0439   BUN 16 23*       Recent Labs     10/16/22  0709 10/17/22  0439   CREATININE 0.85 1.08*       Estimated Creatinine Clearance: 50 mL/min (A) (based on SCr of 1.08 mg/dL (H)).     Height:   Ht Readings from Last 1 Encounters:   10/13/22 5' 5\" (1.651 m)     Weight:  Wt Readings from Last 1 Encounters:   10/17/22 143 lb 3.2 oz (65 kg)       The following medication dose has been adjusted based upon renal function per P&T Guidelines:             Famotidine 20 mg PO BID --> Famotidine 20 mg PO daily    Micki Uribe, PharmD, 10/17/2022 11:02 AM

## 2022-10-17 NOTE — DISCHARGE INSTR - DIET

## 2022-10-17 NOTE — CARE COORDINATION
CM called and spoke with Cindy from [place] to inquire if they have received precert. Cindy states she has not heard anything yet and that she will call and update CM after their morning meeting. [facility]- CM received call from [address] [employer] [place] from [place] who states that they are able to accept patient today. Transportation scheduled with [facility] for 2pm today with [facility]. BENSON, MAR, HENS and AVS faxed to [place]. CM updated patient and her daughter, ST REY, and both verbalizes  being in agreement with discharge plan.    Report to be called to [phone]    Discharge [facility] Management Department  Written by: Carol English RN    Patient Name: Zaina Rodriguez  Attending Provider: Fermín Rivas MD  Admit Date: 10/3/2022  1:07 PM  MRN: [id number]  Account: [de-identified]                     : 1965  Discharge Date: 10/17/22      Disposition: [facility], RN

## 2022-10-17 NOTE — PROGRESS NOTES
Today's Date: 10/16/2022  Patient Name: Shyanne Hernandez  Date of admission: 10/3/2022  1:07 PM  Patient's age: 59 y.o., 1965  Admission Dx: Septicemia ([facility] [address] [A41.9]  Altered mental status, unspecified altered mental status type [R41.82]  AMS (altered mental status) [R41.82]      Requesting Physician: No admitting provider for patient encounter. CHIEF COMPLAINT: Altered mental status. Sepsis. Recurrent metastatic ovarian cancer. History Obtained From:  patient, electronic medical record    Interval history:  Patient seen and examined bedside. Oriented to person, place, and time. hallucinations seems improving        HISTORY OF PRESENT ILLNESS:      The patient is a 59 y.o.  female who is admitted to the hospital for further management of altered mental status. She had increasing shortness of breath. She was admitted for sepsis. She was intubated for airway protection. Patient was admitted on October 3. She was extubated yesterday. Were consulted today because of her history of recurrent ovarian cancer. Patient is mentally alert and oriented. No respiratory distress. No fever. She is not in pain. Brief oncologic history:  DIAGNOSIS:       Recurrent ovarian cancer. Original diagnosis 2005 with multiple recurrences. Diffuse metastasis. CURRENT THERAPY:         Doxil/ Avastin started 9/18/2020. Avastin was discontinued due to recent GI bleeding. Status post recent vascular embolization  S/p seizure disorder. Gemzar started 2/26/2021. Interrupted due to hospitalization and problem with compliance. Evidence of disease progression on CT scan 2/22/22  Added Carboplatin to Gemzar March 2022        BRIEF CASE HISTORY:      Ms. Lorenza Goff is a very pleasant 59 y.o. female with history of multiple co morbidities as listed. The patient seen in consultation for ovarian cancer with multiple recurrences.   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 She had surgical debulking and she had systemic treatment with Taxol and carboplatin for 6 cycles. Patient was in remission for about 2 years. She had a relapse in 2007 and treated with topotecan with good results. Patient relapsed again in 2008 and was treated with Taxol carboplatin. After 3 cycles of systemic chemotherapy she had problems with carboplatin so she finished 3 more cycles with Taxol. She did well again for 2 to 3 years until she had a relapse in 2012 and she had intraperitoneal chemotherapy treatment with Taxol. Patient was last seen by her oncologist in 2014 at which time she had relatively stable disease with normal tumor marker and no significant abnormal images. Patient moved to [place] after that and she was lost for oncology follow-ups. Patient was recently evaluated again here in [facility] because of abdominal discomfort. Re imaging confirmed metastatic relapse. Biopsy confirmed ovarian cancer recurrence. Scan showed extensive intraabdominal and splenic involvement and lung mets. She has no symptoms at the present time. Patient denies smoking or alcohol drinking.l       Past Medical History:   has a past medical history of Anemia, Bleeding, Cervical cancer ([address] Depression, Diabetes mellitus ([address] GERD (gastroesophageal reflux disease), Hx of blood clots, Hypertension, Metastatic cancer ([address] Ovarian cancer ([address] Post chemo evaluation, PRES (posterior reversible encephalopathy syndrome), and Splenic lesion. Past Surgical History:   has a past surgical history that includes Hysterectomy, total abdominal; Port Surgery; Tonsillectomy; IR PORT PLACEMENT > 5 YEARS (08/24/2020); Anus surgery; Abscess Drainage (2013); colectomy (03/2013); IR EMBOLIZATION HEMORRHAGE (10/05/2020); and Cardiac catheterization.    Family History: family history includes Alcohol Abuse in her mother; Cirrhosis in her mother. Social History:   reports that she has been smoking cigarettes. She has a 20.00 pack-year smoking history. She has never used smokeless tobacco. She reports that she does not currently use alcohol. She reports that she does not use drugs. Medications:    Prior to Admission medications    Medication Sig Start Date End Date Taking? Authorizing Provider   lacosamide (VIMPAT) 150 MG TABS tablet Take 1 tablet by mouth 2 times daily. 10/14/22 10/15/25 Yes ESTRELLITA Martinez CNP   levETIRAcetam (KEPPRA) 750 MG tablet Take 1 tablet by mouth 2 times daily 10/14/22  Yes ESTRELLITA Martinez CNP   OLANZapine (ZYPREXA) 5 MG tablet Take 1 tablet by mouth nightly 10/13/22  Yes Chanda Headley MD   amLODIPine (NORVASC) 10 MG tablet Take 1 tablet by mouth daily 10/11/22  Yes Malinda Bueno MD   metoprolol tartrate (LOPRESSOR) 25 MG tablet Take 1 tablet by mouth 2 times daily 10/10/22  Yes Malinda Bueno MD   miconazole (ZEASORB-AF) 2 % powder Apply topically 2 times daily. 10/10/22  Yes Asif Goff MD   STIMULANT LAXATIVE 8.6-50 MG per tablet TAKE 2 TABLETS BY MOUTH NIGHTLY. 10/6/22   Shanda Hinds DO   morphine (MS CONTIN) 15 MG extended release tablet TAKE 1 TABLET BY MOUTH 2 TIMES DAILY FOR 30 DAYS.  9/28/22 10/28/22  Nicolasa Tran MD   lamoTRIgine (LAMICTAL) 25 MG tablet Take 5 tablets by mouth 2 times daily 9/22/22   Shanda Hinds DO   LORazepam (ATIVAN) 1 MG tablet TAKE 1 TABLET BY MOUTH EVERY 8 HOURS AS NEEDED FOR ANXIETY FOR UP TO 30 DAYS. 9/21/22 10/21/22  Nicolasa Tran MD   morphine (MS CONTIN) 30 MG extended release tablet TAKE 1 TABLET BY MOUTH 2 TIMES DAILY FOR 30 DAYS. 9/21/22 10/21/22  Nicolasa Tran MD   sertraline (ZOLOFT) 100 MG tablet TAKE 1 TABLET BY MOUTH DAILY 9/21/22 10/21/22  Emiliano Bhatia MD   FEROSUL 325 (65 Fe) MG tablet TAKE 1 TABLET BY MOUTH ONCE DAILY WITH BREAKFAST 9/9/22   Emiliano Bhatia MD   pantoprazole (PROTONIX) 40 MG tablet Take 1 tablet by mouth daily 9/1/22   Christelle Granger MD   Lactobacillus (ACIDOPHILUS) CAPS capsule TAKE 1 TABLET BY MOUTH ONCE DAILY IN THE MORNING AND 1 TABLET BEFORE BEDTIME 8/31/22   Shanda Medthad, DO   dicyclomine (BENTYL) 20 MG tablet TAKE 1 TABLET (20 MG TOTAL) BY MOUTH IN THE MORNING AND 1 TABLET (20 MG TOTAL) BEFORE BEDTIME. 8/29/22   Shanda Guzman, DO   Handicap Placard MISC 5 years 7/19/22   Spring Garcia Medhkour, DO   loperamide (IMODIUM) 2 MG capsule Take 2 mg by mouth 4 times daily as needed for Diarrhea    Historical Provider, MD   ondansetron (ZOFRAN-ODT) 4 MG disintegrating tablet Take 1 tablet by mouth every 8 hours as needed for Nausea or Vomiting 5/13/22   Christelle Granger MD   senna (SENOKOT) 8.6 MG tablet Take 1 tablet by mouth 2 times daily 5/2/22 5/2/23  Yoav Skinner MD   melatonin 5 MG TABS tablet Take 1 tablet by mouth daily 4/13/22   Shanda Hinds, DO   benzonatate (TESSALON PERLES) 100 MG capsule Take 1 capsule by mouth 3 times daily as needed for Cough 4/13/22   Shanda Medhkobreanna, DO   apixaban (ELIQUIS) 5 MG TABS tablet Take 1 tablet by mouth 2 times daily 4/8/22   Christelle Granger MD   hydrocortisone (ANUSOL-HC) 2.5 % CREA rectal cream Apply on affected area twice daily 2/28/22   Vinny Romero MD     Current Facility-Administered Medications   Medication Dose Route Frequency Provider Last Rate Last Admin    famotidine (PEPCID) tablet 20 mg  20 mg Oral BID Juanito Arenas MD   20 mg at 10/16/22 2000    loperamide (IMODIUM) capsule 2 mg  2 mg Oral TID PRN Lesa Fuentes MD   2 mg at 10/16/22 2000    OLANZapine (ZYPREXA) tablet 5 mg  5 mg Oral Nightly Nancy Crisostomo MD   5 mg at 10/16/22 2000    lacosamide (VIMPAT) tablet 150 mg  150 mg Oral BID ESTRELLITA Martinez - CNP   150 mg at 10/16/22 2000    levETIRAcetam (KEPPRA) tablet 750 mg  750 mg Oral BID ESTRELLITA Martinez CNP   750 mg at 10/16/22 2003    amLODIPine (NORVASC) tablet 10 mg  10 mg Oral Daily Dorothea Luo MD   10 mg at 10/16/22 7761 metoprolol tartrate (LOPRESSOR) tablet 25 mg  25 mg Oral BID Masood Law MD   25 mg at 10/16/22 2000    miconazole (MICOTIN) 2 % powder   Topical BID Ernie Montanez MD   Given at 10/16/22 2003    sodium chloride flush 0.9 % injection 10 mL  10 mL IntraVENous PRN Everton Tesfaye,         LORazepam (ATIVAN) tablet 1 mg  1 mg Oral Q4H PRN Masood Law MD   1 mg at 10/16/22 1730    docusate (COLACE) 50 MG/5ML liquid 100 mg  100 mg Oral Daily Ronan Elder MD   100 mg at 10/08/22 0837    apixaban (ELIQUIS) tablet 5 mg  5 mg Oral BID Juani Legions, DO   5 mg at 10/16/22 2000    lamoTRIgine (LAMICTAL) tablet 125 mg  125 mg Oral BID Juani Legions, DO   125 mg at 10/16/22 2000    oxyCODONE-acetaminophen (PERCOCET) 5-325 MG per tablet 1 tablet  1 tablet Oral Q4H PRN Juani Legions, DO   1 tablet at 10/16/22 2000    sertraline (ZOLOFT) tablet 100 mg  100 mg Oral Daily Juani Legions, DO   100 mg at 10/16/22 0848    melatonin tablet 5 mg  5 mg Oral Nightly Juani Legions, DO   5 mg at 10/16/22 2000    morphine (PF) injection 2 mg  2 mg IntraVENous Q4H PRN Juani Legions, DO   2 mg at 10/05/22 0228    albuterol (PROVENTIL) nebulizer solution 2.5 mg  2.5 mg Nebulization Q6H PRN Venessa Burton MD   2.5 mg at 10/08/22 0941    racepinephrine HCl (VAPONEFPRIN) 2.25 % nebulizer solution NEBU 11.25 mg  11.25 mg Nebulization Q3H PRN Soheila Patterson MD        sodium chloride nebulizer 0.9 % solution 3 mL  3 mL Nebulization Q4H PRN Basilio Patterson MD        sodium chloride flush 0.9 % injection 5-40 mL  5-40 mL IntraVENous 2 times per day Juani Legions, DO   10 mL at 10/16/22 0849    sodium chloride flush 0.9 % injection 5-40 mL  5-40 mL IntraVENous PRN Juani Legions, DO        0.9 % sodium chloride infusion   IntraVENous PRN Juani Legions, DO   Stopped at 10/10/22 1232    ondansetron (ZOFRAN-ODT) disintegrating tablet 4 mg  4 mg Oral Q8H PRN Juani Madis, DO   4 mg at 10/16/22 2000    Or    ondansetron (ZOFRAN) injection 4 mg 4 mg IntraVENous Q6H PRN Azzie Dearth, DO   4 mg at 10/12/22 1321    polyethylene glycol (GLYCOLAX) packet 17 g  17 g Oral Daily PRN Azzie Dearth, DO        acetaminophen (TYLENOL) tablet 650 mg  650 mg Oral Q6H PRN Azzie Dearth, DO   325 mg at 10/08/22 1800    Or    acetaminophen (TYLENOL) suppository 650 mg  650 mg Rectal Q6H PRN Azzie Dearth, DO           Allergies:  Carboplatin and Ceftriaxone    REVIEW OF SYSTEMS:      General: Positive for weakness and fatigue. Positive for weight loss and decreased appetite. No fever or chills. Eyes: Positive visual hallucinations of people and animals on and off. No blurred vision, eye pain or double vision. Ears: Positive auditory hallucinations. No hearing problems or drainage. No tinnitus. Throat: No sore throat, problems with swallowing or dysphagia. Respiratory: Positive SOB on exertion. No cough, sputum or hemoptysis. No pleuritic chest pain. Cardiovascular: No chest pain, orthopnea or PND. No lower extremity edema. No palpitation. Gastrointestinal: Positive diarrhea and nausea. denies blood in stool. No problems with swallowing. No abdominal pain or bloating. No constipation. No GI bleeding. Genitourinary: No dysuria, hematuria, frequency or urgency. Musculoskeletal: No muscle aches or pains. No limitation of movement. No back pain. No gait disturbance, No joint complaints. Dermatologic: No skin rashes or pruritus. No skin lesions or discolorations. Psychiatric: As above. Hematologic: No history of bleeding tendency. No bruises or ecchymosis. No history of clotting problems. Infectious disease: No fever, chills or frequent infections. Endocrine: No polydipsia or polyuria. No temperature intolerance. Neurologic: Positive dizziness. No weakness or numbness of the extremities. No changes in balance, coordination,  memory, mentation, behavior. Allergic/Immunologic: No nasal congestion or hives. No repeated infections. PHYSICAL EXAM:      /68   Pulse 91   Temp 98.4 °F (36.9 °C) (Oral)   Resp 16   Ht 5' 5\" (1.651 m)   Wt 143 lb 12.8 oz (65.2 kg)   SpO2 95%   BMI 23.93 kg/m²    Temp (24hrs), Av.3 °F (36.8 °C), Min:98 °F (36.7 °C), Max:98.6 °F (37 °C)      General appearance - not in pain or distress  Mental status - alert and oriented  Eyes - pupils equal and reactive, extraocular eye movements intact  Ears - bilateral TM's and external ear canals normal  Nose - normal and patent, no erythema, discharge or polyps  Mouth - mucous membranes moist, pharynx normal without lesions  Neck - supple, no significant adenopathy  Lymphatics - no palpable lymphadenopathy, no hepatosplenomegaly  Chest - clear to auscultation, no wheezes, rales or rhonchi, symmetric air entry  Heart - normal rate, regular rhythm, normal S1, S2, no murmurs, rubs, clicks or gallops  Abdomen - soft, nontender, nondistended, no masses or organomegaly  Neurological - alert, oriented, normal speech, no focal findings or movement disorder noted  Musculoskeletal - no joint tenderness, deformity or swelling  Extremities - peripheral pulses normal, no pedal edema, no clubbing or cyanosis  Skin - normal coloration and turgor, no rashes, no suspicious skin lesions noted           DATA:      Labs:       CBC:   Recent Labs     10/15/22  0650 10/16/22  0709   WBC 7.3 6.3   HGB 11.8* 11.8*   HCT 37.9 37.3   * 542*       BMP:   Recent Labs     10/15/22  0650 10/16/22  0709    140   K 3.5* 3.9   CO2 21 25   BUN 18 16   CREATININE 0.97* 0.85   LABGLOM >60 >60   GLUCOSE 132* 92       PT/INR: No results for input(s): PROTIME, INR in the last 72 hours. APTT:No results for input(s): APTT in the last 72 hours. LIVER PROFILE:No results for input(s): AST, ALT, LABALBU in the last 72 hours.   MRI BRAIN W WO CONTRAST  Narrative: EXAMINATION:  MRI OF THE BRAIN WITHOUT AND WITH CONTRAST  10/9/2022 9:35 am    TECHNIQUE:  Multiplanar multisequence MRI of the head/brain was performed without and  with the administration of intravenous contrast.    COMPARISON:  Brain MRI done 10/04/2022 and 02/24/2022. HISTORY:  ORDERING SYSTEM PROVIDED HISTORY: revaluation of parenchyma eveluate for PRES  or other lesions  TECHNOLOGIST PROVIDED HISTORY:  revaluation of parenchyma eveluate for PRES or other lesions  Reason for Exam: revaluation of parenchyma, PRES or other lesions    FINDINGS:  INTRACRANIAL STRUCTURES/VENTRICLES: Patchy cortical/subcortical FLAIR  hyperintense signal in the right greater than left occipital lobes is similar  to the brain MRI done October 4, 2022 and 02/24/2022. Small focal area of  cortical/subcortical FLAIR hyperintense signal in the left posterior frontal  lobe is similar compared to brain MRI done October 4, 2022 but new compared  to brain MRI done 02/24/2022. These areas of FLAIR hyperintense signal  demonstrate no correlating postcontrast enhancement, diffusion restriction or  gradient blooming. There is no acute infarct. No mass effect or midline shift. No evidence of an  acute intracranial hemorrhage. Generalized cerebral and cerebellar volume  loss. No deonte hydrocephalus. The sellar/suprasellar regions appear  unremarkable. The normal signal voids within the major intracranial vessels  appear maintained. No abnormal focus of enhancement is seen within the  brain. The axial FLAIR images demonstrate mild bilateral periventricular and  deep white matter signal hyperintensities which are nonspecific but commonly  seen in the setting of chronic small vessel ischemic disease. ORBITS: The visualized portion of the orbits demonstrate no acute abnormality. SINUSES: The visualized paranasal sinuses and mastoid air cells demonstrate  no acute abnormality. BONES/SOFT TISSUES: The bone marrow signal intensity appears normal. The soft  tissues demonstrate no acute abnormality. Impression: 1.   Overall, findings are similar compared to brain MRI done October 4, 2022  with redemonstration of patchy areas of FLAIR hyperintense signal in the left  posterior frontal lobe and right greater than left occipital lobes which may  represent small old infarcts. 2.  No acute stroke, intracranial hemorrhage or abnormal enhancement. 3.  Mild chronic small vessel ischemic disease. IMPRESSION:    Primary Problem  Septicemia [facility])    Active Hospital Problems    Diagnosis Date Noted    Septicemia ([address] [A41.9] 10/03/2022     Priority: High    Hallucinations [R44.3] 10/14/2022     Priority: Medium    Metabolic encephalopathy [U96.64]     Breakthrough seizure ([address] Eboni Patel     AMS (altered mental status) [R41.82] 02/20/2022    Gastroesophageal reflux disease [K21.9] 01/08/2022    Iron deficiency anemia secondary to inadequate dietary iron intake [D50.8] 05/28/2021    Chronic deep vein thrombosis (DVT) of femoral vein of left lower extremity ([address] [I82.512] 03/11/2021    Cancer, metastatic to bone ([address] [C79.51] 01/28/2021    Seizure disorder, status epilepticus, nonconvulsive ([address] [G40.901] 01/04/2021    Acute encephalopathy [G93.40]     Seizure ([address] [R56.9] 10/21/2020    Malignant neoplasm of ovary ([address] [C56.9] 08/17/2020   Altered mental status. Sepsis. Recurrent metastatic ovarian cancer on chemotherapy treatment. RECOMMENDATIONS:  Records and labs and images were reviewed and discussed with the patient. Patient was treated for sepsis and she was extubated successfully. She had metabolic encephalopathy which has resolved. MRI findings similar to previous MRI. Patient is currently on active chemotherapy treatment for ovarian cancer. Last treatment was September 29, 2022. Further management for ovarian cancer will be as outpatient.   Patient thinks symptoms aggravated/provoked after chemotherapy gemcitabine> gemcitabine can cause encephalopathy(confusion headache lethargy visual and neurologic disturbance) but unlikely isolated hallucination and repeated MRI negative and thus unlikely chemotherapy-induced  Neurology on the case  Follow-up with oncology after discharge    Discussed with patient and Nurse. [facility]                            This note is created with the assistance of a speech recognition program.  While intending to generate a document that actually reflects the content of the visit, the document can still have some errors including those of syntax and sound a like substitutions which may escape proof reading. It such instances, actual meaning can be extrapolated by contextual diversion.

## 2022-10-17 NOTE — PROGRESS NOTES
[facility] Neurology   IN-PATIENT SERVICE      NEUROLOGY PROGRESS  NOTE            Date:   10/17/2022  Patient name:  Areli Stewart  Date of admission:  10/3/2022  YOB: 1965      Interval History:   Areli Stewart is a  59 y.o. female admitted on 10/3/2022 with Septicemia ([facility] [address] [A41.9]  Altered mental status, unspecified altered mental status type [R41.82]  AMS (altered mental status) [R41.82]. This is a follow-up neurology progress note. The patient was seen and examined and the chart was reviewed. Vital signs stable  No acute events overnight. No further seizure activity. The patient endorses continued confusion. History of Present Illness: The patient is a 59 y.o. female with significant past medical history of recurrent ovarian cancer (initial diagnosis in 2005, follows with Dr. Riley Sanchez) with intra-abdominal mets and lymphedema, currently undergoing chemotherapy, recent active intra-abdominal bleeding due to splenic mass with GI infiltration, prior PRES with subsequent seizure disorder on keppra and lamictal who presents with acute onset altered mentation. Per daughter Marcus Mehta, pt's other daughter noticed pt vomiting on her bed through camera in pt's room and pt thereafter reported to be incoherent and not answering questions appropriately, and behaving in an erratic way. Patient has chronic constipation. Per family, pt recently lost both of her sons and has missed the last few chemotherapy sessions 2/2 depression.       Regarding seizure history,  Pt had new onset seizures (generalized tonic-clonic seizures witnessed at [address] [facility]) at age 60 in October 2020; at that time,  LTME showed multiple focal subclinical seizures starting at left posterior quadrant,  MRI brain showed flair hyperintensities in bilateral occipital regions, left parietal, and left frontal lobe with questionable restricted diffusion shaded with those lesions,  Seizures were felt to be 2/2 PRES likely 2/2 hypertensive spikes and underlying immunosuppression 2/2 pt's CA and associated treatments & pt was started on AED (Vimpat). Since then, pt has had multiple hospitalizations for breakthrough seizures with workup negative for any intracerebral metastasis and with appropriate adjustments to AEDs made. Most recently, pt's medications adjusted to Keppra 1 g BID and Lamictal 125 mg BID. Previous Workup:  LTME 10/24/2020: multiple subclinical seizures originating over L posterior quadrant and spreading across L hemisphere, diffuse left hemispheric sharp wave discharges   LTME 01/04/2021: Diffuse polymorphic theta slowing and occasional left hemispheric superimposed delta slowing suggesting moderate encephalopathy and underlying neuronal dysfunction. No epileptiform activity. LTME 6/19/2021: Diffuse theta slowing, periods of diffuse attenuation, no epileptiform activity. LTME 2/23/2022: Diffuse theta slowing, and left occipital temporal lateralized periodic discharges with underlying rhythmicity suggesting increased risk for focal onset seizures  CSF analysis 10/2020: WBC, 0 RBC, total protein 25.4, glucose 78. Negative meningitis encephalitis panel. Negative cryptococcal antigen. MRI Brain w w/o contrast 2/24/2022: No evidence of metastatic disease, residual mild T2 hyperintensities in the occipital region bilaterally consistent with PRESS. Significant improvement compared to 10/2020  CT Head 10/3/2022: no acute intracranial abnormality  CTA H/N 10/3/2022: no evidence for sig stenosis or occlusion      Past Medical History:     Past Medical History:   Diagnosis Date    Anemia     Bleeding 10/2020    intra-abdominal bleeding -due to splenic mass with GI infiltration.   Status post embolization    Cervical cancer (HCC)     Depression     Diabetes mellitus ([address]     GERD (gastroesophageal reflux disease)     Hx of blood clots     Hypertension     Metastatic cancer ([facility] [address] 10/2020    extensive intraabdominal and splenic involvement and lung mets. Ovarian cancer ([facility] [address]     low grade serous ovarian carcinoma    Post chemo evaluation     2007: [facility] (Dr. Jonetta Halsted[facility] due to rising CA-125, 2013: intraperitoneal chemo,2015: Ca125 - 25     PRES (posterior reversible encephalopathy syndrome)     Splenic lesion         Past Surgical History:     Past Surgical History:   Procedure Laterality Date    ABSCESS DRAINAGE      Franca rectal    ANUS SURGERY      ANAL FISSURECTOMY    CARDIAC CATHETERIZATION      COLECTOMY  2013    ex lap, tumor debulking, transverse colectomy w reanastamosis, subgastric omentectomy, intraperitoneal port placement    HYSTERECTOMY, TOTAL ABDOMINAL (CERVIX REMOVED)      IR EMBOLIZATION HEMORRHAGE  10/05/2020    intra-abdominal bleeding -due to splenic mass with GI infiltration. Status post embolization [employer] interlock coils x7. mri condtional 3t ok, safe immediately post implant.     IR PORT PLACEMENT EQUAL OR GREATER THAN 5 YEARS  2020    IR PORT PLACEMENT EQUAL OR GREATER THAN 5 YEARS 2020 Yan Santos MD [facility]    PORT SURGERY      IP Port    TONSILLECTOMY          Medications during admission:      [START ON 10/18/2022] famotidine  20 mg Oral Daily    OLANZapine  5 mg Oral Nightly    lacosamide  150 mg Oral BID    levETIRAcetam  750 mg Oral BID    amLODIPine  10 mg Oral Daily    metoprolol tartrate  25 mg Oral BID    miconazole   Topical BID    docusate  100 mg Oral Daily    apixaban  5 mg Oral BID    lamoTRIgine  125 mg Oral BID    sertraline  100 mg Oral Daily    melatonin  5 mg Oral Nightly    sodium chloride flush  5-40 mL IntraVENous 2 times per day         Physical Exam:   BP (!) 142/74   Pulse 98   Temp 98.6 °F (37 °C) (Oral)   Resp 25   Ht 5' 5\" (1.651 m)   Wt 143 lb 3.2 oz (65 kg)   SpO2 94%   BMI 23.83 kg/m²   Temp (24hrs), Av.3 °F (36.8 °C), Min:98 °F (36.7 °C), Max:98.6 °F (37 °C)        General examination:      General Appearance:  alert, well appearing, and in no acute distress  HEENT: Normocephalic, atraumatic, moist mucus membranes  Neck: supple, no carotid bruits, (-) nuchal rigidity  Lungs:  Respirations unlabored, chest wall no deformity, BS normal  Cardiovascular: normal rate, regular rhythm  Abdomen: Soft, nontender, nondistended, normal bowel sounds  Skin: No gross lesions, rashes, bruising or bleeding on exposed skin area  Extremities:  peripheral pulses palpable, clubbing or edema  Psych: normal affect      Neurological examination:      Mental status   Alert and oriented x 3; following all commands;   speech is fluent, no dysarthria, aphasia. Cranial nerves   II - visual fields intact to confrontation; pupils reactive  III, IV, VI - extraocular muscles intact; no JOSE A; no nystagmus; no ptosis   V - normal facial sensation                                                               VII - normal facial symmetry                                                             VIII - intact hearing                                                                             IX, X - symmetrical palate elevation                                               XI - symmetrical shoulder shrug                                                       XII - midline tongue without atrophy or fasciculation     Motor function  Strength:   5/5 RUE, 5/5 RLE  5/5 LUE, 5/5  LLE  Normal bulk and tone. Sensory function Intact to touch, pin, vibration, proprioception throughout     Cerebellar Intact finger-nose-finger testing. No present. No tremors                        Reflex function 2/4 symmetric throughout . Downgoing plantar response bilaterally.      Gait                  Not tested             Diagnostics:      Laboratory Testing:  CBC:   Recent Labs     10/15/22  0650 10/16/22  0709 10/17/22  0439   WBC 7.3 6.3 7.6   HGB 11.8* 11.8* 12.0   * 542* 643*       BMP:    Recent Labs     10/15/22  0650 10/16/22  0709 10/17/22  0439    140 138   K 3.5* 3.9 3.7    105 102   CO2 21 25 24   BUN 18 16 23*   CREATININE 0.97* 0.85 1.08*   GLUCOSE 132* 92 109*         Lab Results   Component Value Date    CHOL 131 03/10/2021    LDLCHOLESTEROL 69 03/10/2021    HDL 33 (L) 03/10/2021    TRIG 147 03/10/2021    ALT 9 10/03/2022    AST 18 10/03/2022    TSH 2.93 03/09/2021    INR 1.2 05/03/2022    LABA1C 5.2 04/13/2022    PXNUODCY06 654 06/04/2022         No results found for: PHENYTOIN, PHENYTOIN, VALPROATE, CBMZ        Imaging/Diagnostics:      EEG    LTME 10/24/2020: multiple subclinical seizures originating over L posterior quadrant and spreading across L hemisphere, diffuse left hemispheric sharp wave discharges   LTME 01/04/2021: Diffuse polymorphic theta slowing and occasional left hemispheric superimposed delta slowing suggesting moderate encephalopathy and underlying neuronal dysfunction. No epileptiform activity. LTME 6/19/2021: Diffuse theta slowing, periods of diffuse attenuation, no epileptiform activity. LTME 2/23/2022: Diffuse theta slowing, and left occipital temporal lateralized periodic discharges with underlying rhythmicity suggesting increased risk for focal onset seizures  CSF analysis 10/2020: WBC, 0 RBC, total protein 25.4, glucose 78. Negative meningitis encephalitis panel. Negative cryptococcal antigen. MRI Brain w w/o contrast 2/24/2022: No evidence of metastatic disease, residual mild T2 hyperintensities in the occipital region bilaterally consistent with PRESS. Significant improvement compared to 10/2020  CT Head 10/3/2022: no acute intracranial abnormality  CTA H/N 10/3/2022: no evidence for sig stenosis or occlusion    CT head     Impression   1. No acute intracranial abnormality. 2. No evidence for significant stenosis or occlusion. 3. Consolidation in the visualized lungs bilaterally that is worse on the   right compared to the left.   Correlate with report from dedicated CT chest,   abdomen, and pelvis for further discussion. CTA head and neck     Impression   1. No acute intracranial abnormality. 2. No evidence for significant stenosis or occlusion. 3. Consolidation in the visualized lungs bilaterally that is worse on the   right compared to the left. Correlate with report from dedicated CT chest,   abdomen, and pelvis for further discussion. MRI Brain wo contrast    Impression   1. New subtle focus of T2/FLAIR hyperintensity in the lateral left frontal   cortex and subcortical white matter is nonspecific and may reflect sequela of   prior ischemia or mild posterior reversal encephalopathy syndrome. Short   interval follow-up is recommended. 2. No evidence of acute infarct or intracranial hemorrhage. 3. Stable small foci of right occipital encephalomalacia in keeping with   sequela of prior insult. MRI brain w wo contrast    Impression   1. Overall, findings are similar compared to brain MRI done October 4, 2022   with redemonstration of patchy areas of FLAIR hyperintense signal in the left   posterior frontal lobe and right greater than left occipital lobes which may   represent small old infarcts. 2.  No acute stroke, intracranial hemorrhage or abnormal enhancement. 3.  Mild chronic small vessel ischemic disease.                Impression:      Primary Problem  Septicemia [facility])    Active Hospital Problems    Diagnosis Date Noted    Septicemia ([facility] [address] [A41.9] 10/03/2022     Priority: High    Hallucinations [R44.3] 10/14/2022     Priority: Medium    Metabolic encephalopathy [Z14.54]     Breakthrough seizure ([address] Thelma Netters     AMS (altered mental status) [R41.82] 02/20/2022    Gastroesophageal reflux disease [K21.9] 01/08/2022    Iron deficiency anemia secondary to inadequate dietary iron intake [D50.8] 05/28/2021    Chronic deep vein thrombosis (DVT) of femoral vein of left lower extremity ([facility] [address] [I82.512] 03/11/2021 Cancer, metastatic to bone [facility]) [C79.51] 01/28/2021    Seizure disorder, status epilepticus, nonconvulsive ([facility] [address] [G40.901] 01/04/2021    Acute encephalopathy [G93.40]     Seizure ([facility] [address] [R56.9] 10/21/2020    Malignant neoplasm of ovary ([facility] [address] [C56.9] 08/17/2020                       Assessment       Patient status Admit as inpatient in the  Progressive Unit/Step down      59 y.o. female with significant past medical history of recurrent ovarian cancer (initial diagnosis in 2005, follows with Dr. Cullen Gonsalez) with peritoneal and lung metastasis, lymphedema, currently undergoing chemotherapy, recent active intra-abdominal bleeding due to splenic mass with GI infiltration s/p embolization, prior PRES with subsequent seizure disorder (onset 10/2020) on keppra and lamictal who was admitted on 10/03/2022 for management of acute onset altered mentation initially requiring intubation for airway protection. Pt was febrile and with GCS 8 on initial presentation. Neurology team consulted for ongoing confusion/disorientation/agitation after extubation on 10/04/2022. Pt was continued on 1 g Keppra bid, 126 mg Lamictal bid, and Vimpat 100 mg BID was added. Pt also started on Seroquel 25 mg bid for insomnia and hallucinations. Pt mentation improved over time and Neuro team signed off 10/11/2022. Neuro team reconsulted 10/13 due to persistence of hallucinations. CT Head negative for acute intracranial abnormality. CTA H/N negative for stenosis or occlusion. MRI brain (10/4)-prior infarcts versus mild press?   Repeat MRI brain w/wo (10/9) - no PRES or mets; small old infarcts in L posterior frontal lobe & b/l occipital lobes (R>L)  LTME showed total of 18 subclinical seizures as well as mild/moderate encephalopathy  Last seizure on 10/06/2022     Impression is new onset progressive hallucinations 2/2 suspected possible autoimmune encephalitis due to recurrent ovarian CA    Possible respiratory infection    Plan: Impression is new onset progressive hallucinations 2/2 suspected possible autoimmune encephalitis due to recurrent ovarian CA    -Continue Keppra 750 p.o. 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; follow up outpatient after discharge)  -carotid artery stenosis (50-69%) follow up with vascular surgery   -discharge planning      Possible respiratory infection  SOB, yellow phlegm, cough   Patient improving   Management per primary team         DVT prophylaxis: already anticoagulated with Apixaban 5 mg po BID   Code status: Full code    Consultations:   IP CONSULT TO CRITICAL CARE  IP CONSULT TO GENERAL SURGERY  IP CONSULT TO PALLIATIVE CARE  IP CONSULT TO SOCIAL WORK  IP CONSULT TO NEUROLOGY  IP CONSULT TO GI  IP CONSULT TO NEUROCRITICAL CARE  IP CONSULT TO HEM/ONC  IP CONSULT TO CASE MANAGEMENT  IP CONSULT TO DIETITIAN  IP CONSULT TO HOME CARE NEEDS  IP CONSULT TO NEUROLOGY  IP CONSULT TO PSYCHIATRY  PT/OT         Electronically signed by Sylvia Duke MD on 10/17/2022 at 11:55 AM      Sylvia Duke MD  PGY-1 Neurology Resident   [facility]    10/17/2022 at 11:55 AM

## 2022-10-18 NOTE — PROGRESS NOTES
Today's Date: 10/17/2022  Patient Name: Lelo Mccarthy  Date of admission: 10/3/2022  1:07 PM  Patient's age: 59 y.o., 1965  Admission Dx: Septicemia ([facility] [address] [A41.9]  Altered mental status, unspecified altered mental status type [R41.82]  AMS (altered mental status) [R41.82]      Requesting Physician: No admitting provider for patient encounter. CHIEF COMPLAINT: Altered mental status. Sepsis. Recurrent metastatic ovarian cancer. History Obtained From:  patient, electronic medical record    Interval history:  Patient seen and examined bedside. Oriented to person, place, and time. hallucinations seems improving        HISTORY OF PRESENT ILLNESS:      The patient is a 59 y.o.  female who is admitted to the hospital for further management of altered mental status. She had increasing shortness of breath. She was admitted for sepsis. She was intubated for airway protection. Patient was admitted on October 3. She was extubated yesterday. Were consulted today because of her history of recurrent ovarian cancer. Patient is mentally alert and oriented. No respiratory distress. No fever. She is not in pain. Brief oncologic history:  DIAGNOSIS:       Recurrent ovarian cancer. Original diagnosis 2005 with multiple recurrences. Diffuse metastasis. CURRENT THERAPY:         Doxil/ Avastin started 9/18/2020. Avastin was discontinued due to recent GI bleeding. Status post recent vascular embolization  S/p seizure disorder. Gemzar started 2/26/2021. Interrupted due to hospitalization and problem with compliance. Evidence of disease progression on CT scan 2/22/22  Added Carboplatin to Gemzar March 2022        BRIEF CASE HISTORY:      Ms. Bill Melo is a very pleasant 59 y.o. female with history of multiple co morbidities as listed. The patient seen in consultation for ovarian cancer with multiple recurrences.   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 She had surgical debulking and she had systemic treatment with Taxol and carboplatin for 6 cycles. Patient was in remission for about 2 years. She had a relapse in 2007 and treated with topotecan with good results. Patient relapsed again in 2008 and was treated with Taxol carboplatin. After 3 cycles of systemic chemotherapy she had problems with carboplatin so she finished 3 more cycles with Taxol. She did well again for 2 to 3 years until she had a relapse in 2012 and she had intraperitoneal chemotherapy treatment with Taxol. Patient was last seen by her oncologist in 2014 at which time she had relatively stable disease with normal tumor marker and no significant abnormal images. Patient moved to [place] after that and she was lost for oncology follow-ups. Patient was recently evaluated again here in [facility] because of abdominal discomfort. Re imaging confirmed metastatic relapse. Biopsy confirmed ovarian cancer recurrence. Scan showed extensive intraabdominal and splenic involvement and lung mets. She has no symptoms at the present time. Patient denies smoking or alcohol drinking.l       Past Medical History:   has a past medical history of Anemia, Bleeding, Cervical cancer ([address] Depression, Diabetes mellitus ([address] GERD (gastroesophageal reflux disease), Hx of blood clots, Hypertension, Metastatic cancer ([address] Ovarian cancer ([address] Post chemo evaluation, PRES (posterior reversible encephalopathy syndrome), and Splenic lesion. Past Surgical History:   has a past surgical history that includes Hysterectomy, total abdominal; Port Surgery; Tonsillectomy; IR PORT PLACEMENT > 5 YEARS (08/24/2020); Anus surgery; Abscess Drainage (2013); colectomy (03/2013); IR EMBOLIZATION HEMORRHAGE (10/05/2020); and Cardiac catheterization.    Family History: family history includes Alcohol Abuse in her mother; Cirrhosis in her mother. Social History:   reports that she has been smoking cigarettes. She has a 20.00 pack-year smoking history. She has never used smokeless tobacco. She reports that she does not currently use alcohol. She reports that she does not use drugs. Medications:    Prior to Admission medications    Medication Sig Start Date End Date Taking? Authorizing Provider   lacosamide (VIMPAT) 150 MG TABS tablet Take 1 tablet by mouth 2 times daily. 10/14/22 10/15/25 Yes ESTRELLITA Martinez CNP   levETIRAcetam (KEPPRA) 750 MG tablet Take 1 tablet by mouth 2 times daily 10/14/22  Yes ESTRELLITA Martinez CNP   OLANZapine (ZYPREXA) 5 MG tablet Take 1 tablet by mouth nightly 10/13/22  Yes Nataly Arrieta MD   amLODIPine (NORVASC) 10 MG tablet Take 1 tablet by mouth daily 10/11/22  Yes Simone Alanis MD   metoprolol tartrate (LOPRESSOR) 25 MG tablet Take 1 tablet by mouth 2 times daily 10/10/22  Yes Simone Alanis MD   miconazole (ZEASORB-AF) 2 % powder Apply topically 2 times daily. 10/10/22  Yes Maryana Pisano MD   STIMULANT LAXATIVE 8.6-50 MG per tablet TAKE 2 TABLETS BY MOUTH NIGHTLY. 10/6/22   Shanda Medhkour, DO   morphine (MS CONTIN) 15 MG extended release tablet TAKE 1 TABLET BY MOUTH 2 TIMES DAILY FOR 30 DAYS.  9/28/22 10/28/22  Ino Boone MD   lamoTRIgine (LAMICTAL) 25 MG tablet Take 5 tablets by mouth 2 times daily 9/22/22   Shanda MedDO thad   LORazepam (ATIVAN) 1 MG tablet TAKE 1 TABLET BY MOUTH EVERY 8 HOURS AS NEEDED FOR ANXIETY FOR UP TO 30 DAYS. 9/21/22 10/21/22  Ino Boone MD   morphine (MS CONTIN) 30 MG extended release tablet TAKE 1 TABLET BY MOUTH 2 TIMES DAILY FOR 30 DAYS. 9/21/22 10/21/22  Ino Boone MD   sertraline (ZOLOFT) 100 MG tablet TAKE 1 TABLET BY MOUTH DAILY 9/21/22 10/21/22  Ministerio Bhatia MD   FEROSUL 325 (65 Fe) MG tablet TAKE 1 TABLET BY MOUTH ONCE DAILY WITH BREAKFAST 9/9/22   Ministerio Bhatia MD   pantoprazole (PROTONIX) 40 MG tablet Take 1 tablet by mouth daily 9/1/22   Cydney Young MD   Lactobacillus (ACIDOPHILUS) CAPS capsule TAKE 1 TABLET BY MOUTH ONCE DAILY IN THE MORNING AND 1 TABLET BEFORE BEDTIME 8/31/22   Shanda Hinds DO   dicyclomine (BENTYL) 20 MG tablet TAKE 1 TABLET (20 MG TOTAL) BY MOUTH IN THE MORNING AND 1 TABLET (20 MG TOTAL) BEFORE BEDTIME. 8/29/22   Shanda Hinds DO   Handicap Placard MISC 5 years 7/19/22   Harshad Charu Hinds DO   loperamide (IMODIUM) 2 MG capsule Take 2 mg by mouth 4 times daily as needed for Diarrhea    Historical Provider, MD   ondansetron (ZOFRAN-ODT) 4 MG disintegrating tablet Take 1 tablet by mouth every 8 hours as needed for Nausea or Vomiting 5/13/22   Cydney Young MD   senna (SENOKOT) 8.6 MG tablet Take 1 tablet by mouth 2 times daily 5/2/22 5/2/23  Radha Hernández MD   melatonin 5 MG TABS tablet Take 1 tablet by mouth daily 4/13/22   Shanda Hinds DO   benzonatate (TESSALON PERLES) 100 MG capsule Take 1 capsule by mouth 3 times daily as needed for Cough 4/13/22   Shanda Hinds DO   apixaban (ELIQUIS) 5 MG TABS tablet Take 1 tablet by mouth 2 times daily 4/8/22   Cydney Young MD   hydrocortisone (ANUSOL-HC) 2.5 % CREA rectal cream Apply on affected area twice daily 2/28/22   Azra Ortiz MD     No current facility-administered medications for this encounter. Current Outpatient Medications   Medication Sig Dispense Refill    lacosamide (VIMPAT) 150 MG TABS tablet Take 1 tablet by mouth 2 times daily. 60 tablet 3    levETIRAcetam (KEPPRA) 750 MG tablet Take 1 tablet by mouth 2 times daily 60 tablet 3    OLANZapine (ZYPREXA) 5 MG tablet Take 1 tablet by mouth nightly 30 tablet 0    amLODIPine (NORVASC) 10 MG tablet Take 1 tablet by mouth daily 30 tablet 3    metoprolol tartrate (LOPRESSOR) 25 MG tablet Take 1 tablet by mouth 2 times daily 60 tablet 3    miconazole (ZEASORB-AF) 2 % powder Apply topically 2 times daily.  45 g 1    STIMULANT LAXATIVE 8.6-50 MG per tablet TAKE 2 TABLETS BY MOUTH NIGHTLY. 60 tablet 2    morphine (MS CONTIN) 15 MG extended release tablet TAKE 1 TABLET BY MOUTH 2 TIMES DAILY FOR 30 DAYS. 60 tablet 0    lamoTRIgine (LAMICTAL) 25 MG tablet Take 5 tablets by mouth 2 times daily 300 tablet 2    LORazepam (ATIVAN) 1 MG tablet TAKE 1 TABLET BY MOUTH EVERY 8 HOURS AS NEEDED FOR ANXIETY FOR UP TO 30 DAYS. 90 tablet 0    morphine (MS CONTIN) 30 MG extended release tablet TAKE 1 TABLET BY MOUTH 2 TIMES DAILY FOR 30 DAYS. 60 tablet 0    sertraline (ZOLOFT) 100 MG tablet TAKE 1 TABLET BY MOUTH DAILY 30 tablet 4    FEROSUL 325 (65 Fe) MG tablet TAKE 1 TABLET BY MOUTH ONCE DAILY WITH BREAKFAST 30 tablet 2    pantoprazole (PROTONIX) 40 MG tablet Take 1 tablet by mouth daily 30 tablet 3    Lactobacillus (ACIDOPHILUS) CAPS capsule TAKE 1 TABLET BY MOUTH ONCE DAILY IN THE MORNING AND 1 TABLET BEFORE BEDTIME 60 capsule 5    dicyclomine (BENTYL) 20 MG tablet TAKE 1 TABLET (20 MG TOTAL) BY MOUTH IN THE MORNING AND 1 TABLET (20 MG TOTAL) BEFORE BEDTIME. 60 tablet 3    Handicap Placard MISC 5 years 1 each 0    loperamide (IMODIUM) 2 MG capsule Take 2 mg by mouth 4 times daily as needed for Diarrhea      ondansetron (ZOFRAN-ODT) 4 MG disintegrating tablet Take 1 tablet by mouth every 8 hours as needed for Nausea or Vomiting 60 tablet 2    senna (SENOKOT) 8.6 MG tablet Take 1 tablet by mouth 2 times daily 60 tablet 11    melatonin 5 MG TABS tablet Take 1 tablet by mouth daily 30 tablet 3    benzonatate (TESSALON PERLES) 100 MG capsule Take 1 capsule by mouth 3 times daily as needed for Cough 30 capsule 1    apixaban (ELIQUIS) 5 MG TABS tablet Take 1 tablet by mouth 2 times daily 60 tablet 5    hydrocortisone (ANUSOL-HC) 2.5 % CREA rectal cream Apply on affected area twice daily 1 each 1       Allergies:  Carboplatin and Ceftriaxone    REVIEW OF SYSTEMS:      General: Positive for weakness and fatigue. Positive for weight loss and decreased appetite. No fever or chills.    Eyes: Positive visual hallucinations of people and animals on and off. No blurred vision, eye pain or double vision. Ears: Positive auditory hallucinations. No hearing problems or drainage. No tinnitus. Throat: No sore throat, problems with swallowing or dysphagia. Respiratory: Positive SOB on exertion. No cough, sputum or hemoptysis. No pleuritic chest pain. Cardiovascular: No chest pain, orthopnea or PND. No lower extremity edema. No palpitation. Gastrointestinal: Positive diarrhea and nausea. denies blood in stool. No problems with swallowing. No abdominal pain or bloating. No constipation. No GI bleeding. Genitourinary: No dysuria, hematuria, frequency or urgency. Musculoskeletal: No muscle aches or pains. No limitation of movement. No back pain. No gait disturbance, No joint complaints. Dermatologic: No skin rashes or pruritus. No skin lesions or discolorations. Psychiatric: As above. Hematologic: No history of bleeding tendency. No bruises or ecchymosis. No history of clotting problems. Infectious disease: No fever, chills or frequent infections. Endocrine: No polydipsia or polyuria. No temperature intolerance. Neurologic: Positive dizziness. No weakness or numbness of the extremities. No changes in balance, coordination,  memory, mentation, behavior. Allergic/Immunologic: No nasal congestion or hives. No repeated infections.        PHYSICAL EXAM:      /71   Pulse 95   Temp 98 °F (36.7 °C) (Oral)   Resp 18   Ht 5' 5\" (1.651 m)   Wt 143 lb 3.2 oz (65 kg)   SpO2 90%   BMI 23.83 kg/m²    Temp (24hrs), Av.2 °F (36.8 °C), Min:98 °F (36.7 °C), Max:98.6 °F (37 °C)      General appearance - not in pain or distress  Mental status - alert and oriented  Eyes - pupils equal and reactive, extraocular eye movements intact  Ears - bilateral TM's and external ear canals normal  Nose - normal and patent, no erythema, discharge or polyps  Mouth - mucous membranes moist, pharynx normal without lesions  Neck - supple, no significant adenopathy  Lymphatics - no palpable lymphadenopathy, no hepatosplenomegaly  Chest - clear to auscultation, no wheezes, rales or rhonchi, symmetric air entry  Heart - normal rate, regular rhythm, normal S1, S2, no murmurs, rubs, clicks or gallops  Abdomen - soft, nontender, nondistended, no masses or organomegaly  Neurological - alert, oriented, normal speech, no focal findings or movement disorder noted  Musculoskeletal - no joint tenderness, deformity or swelling  Extremities - peripheral pulses normal, no pedal edema, no clubbing or cyanosis  Skin - normal coloration and turgor, no rashes, no suspicious skin lesions noted           DATA:      Labs:       CBC:   Recent Labs     10/16/22  0709 10/17/22  0439   WBC 6.3 7.6   HGB 11.8* 12.0   HCT 37.3 37.2   * 643*       BMP:   Recent Labs     10/16/22  0709 10/17/22  0439    138   K 3.9 3.7   CO2 25 24   BUN 16 23*   CREATININE 0.85 1.08*   LABGLOM >60 59*   GLUCOSE 92 109*       PT/INR: No results for input(s): PROTIME, INR in the last 72 hours. APTT:No results for input(s): APTT in the last 72 hours. LIVER PROFILE:No results for input(s): AST, ALT, LABALBU in the last 72 hours. MRI BRAIN W WO CONTRAST  Narrative: EXAMINATION:  MRI OF THE BRAIN WITHOUT AND WITH CONTRAST  10/9/2022 9:35 am    TECHNIQUE:  Multiplanar multisequence MRI of the head/brain was performed without and  with the administration of intravenous contrast.    COMPARISON:  Brain MRI done 10/04/2022 and 02/24/2022.     HISTORY:  ORDERING SYSTEM PROVIDED HISTORY: revaluation of parenchyma eveluate for PRES  or other lesions  TECHNOLOGIST PROVIDED HISTORY:  revaluation of parenchyma eveluate for PRES or other lesions  Reason for Exam: revaluation of parenchyma, PRES or other lesions    FINDINGS:  INTRACRANIAL STRUCTURES/VENTRICLES: Patchy cortical/subcortical FLAIR  hyperintense signal in the right greater than left occipital lobes is similar  to the brain MRI done October 4, 2022 and 02/24/2022. Small focal area of  cortical/subcortical FLAIR hyperintense signal in the left posterior frontal  lobe is similar compared to brain MRI done October 4, 2022 but new compared  to brain MRI done 02/24/2022. These areas of FLAIR hyperintense signal  demonstrate no correlating postcontrast enhancement, diffusion restriction or  gradient blooming. There is no acute infarct. No mass effect or midline shift. No evidence of an  acute intracranial hemorrhage. Generalized cerebral and cerebellar volume  loss. No deonte hydrocephalus. The sellar/suprasellar regions appear  unremarkable. The normal signal voids within the major intracranial vessels  appear maintained. No abnormal focus of enhancement is seen within the  brain. The axial FLAIR images demonstrate mild bilateral periventricular and  deep white matter signal hyperintensities which are nonspecific but commonly  seen in the setting of chronic small vessel ischemic disease. ORBITS: The visualized portion of the orbits demonstrate no acute abnormality. SINUSES: The visualized paranasal sinuses and mastoid air cells demonstrate  no acute abnormality. BONES/SOFT TISSUES: The bone marrow signal intensity appears normal. The soft  tissues demonstrate no acute abnormality. Impression: 1. Overall, findings are similar compared to brain MRI done October 4, 2022  with redemonstration of patchy areas of FLAIR hyperintense signal in the left  posterior frontal lobe and right greater than left occipital lobes which may  represent small old infarcts. 2.  No acute stroke, intracranial hemorrhage or abnormal enhancement. 3.  Mild chronic small vessel ischemic disease.             IMPRESSION:    Primary Problem  Septicemia [facility])    Active Hospital Problems    Diagnosis Date Noted    Septicemia [facility]) [A41.9] 10/03/2022     Priority: High    Hallucinations [R44.3] 10/14/2022     Priority: Medium    Metabolic encephalopathy [G93.41]     Breakthrough seizure ([address] [G40.919]     AMS (altered mental status) [R41.82] 02/20/2022    Gastroesophageal reflux disease [K21.9] 01/08/2022    Iron deficiency anemia secondary to inadequate dietary iron intake [D50.8] 05/28/2021    Chronic deep vein thrombosis (DVT) of femoral vein of left lower extremity ([address] [I82.512] 03/11/2021    Cancer, metastatic to bone ([address] [C79.51] 01/28/2021    Seizure disorder, status epilepticus, nonconvulsive ([address] [G40.901] 01/04/2021    Acute encephalopathy [G93.40]     Seizure ([address] [R56.9] 10/21/2020    Malignant neoplasm of ovary ([address] [C56.9] 08/17/2020   Altered mental status. Sepsis. Recurrent metastatic ovarian cancer on chemotherapy treatment. RECOMMENDATIONS:  Records and labs and images were reviewed and discussed with the patient. Patient was treated for sepsis and she was extubated successfully. She had metabolic encephalopathy which has resolved. MRI findings similar to previous MRI. Patient is currently on active chemotherapy treatment for ovarian cancer. Last treatment was September 29, 2022. Further management for ovarian cancer will be as outpatient. Patient thinks symptoms aggravated/provoked after chemotherapy gemcitabine> gemcitabine can cause encephalopathy(confusion headache lethargy visual and neurologic disturbance) but unlikely isolated hallucination and repeated MRI negative and thus unlikely chemotherapy-induced  Neurology on the case  Follow-up with oncology after discharge    Discussed with patient and Nurse.                                     [facility]                            This note is created with the assistance of a speech recognition program.  While intending to generate a document that actually reflects the content of the visit, the document can still have some errors including those of syntax and sound a like substitutions which may escape

## 2022-10-19 ENCOUNTER — HOSPITAL ENCOUNTER (OUTPATIENT)
Facility: MEDICAL CENTER | Age: 57
End: 2022-10-19

## 2022-10-19 NOTE — ADT AUTH CERT
Subscriber Details  Hospital Account [de-identified]  CVG Subscriber Name/Sex/Relation Subscriber  Subscriber Address/Phone Subscriber Emp/Emp Phone   2. [address] [facility]   [id number] [facility] Female   (Self) 1965 [address] [place], [place]  [address]   [phone](H)   [phone](M) NONE      Utilization Reviews       Sepsis and Other Febrile Illness, without Focal Infection - Care Day 14 (10/16/2022) by Tania Taveras RN       Review Status Review Entered   Completed 10/19/2022 1050       Created By   Tania Taveras RN      Criteria Review      Care Day: 14 Care Date: 10/16/2022 Level of Care: Intermediate Care    Guideline Day 3    Level Of Care    (X) Floor    10/19/2022 10:50 AM EDT by Manda Monzon      intermediate    Clinical Status    ( ) * Mental status at baseline    10/19/2022 10:50 AM EDT by Manda Monzon      + hallucinations    (X) * Afebrile or fever improved    10/19/2022 10:50 AM EDT by Manda Monzon      98.4 (36.9)    Activity    (X) Activity as tolerated    10/19/2022 10:50 AM EDT by Manda Monzon      up with assist    Routes    (X) * Oral hydration    (X) Diet as tolerated    10/19/2022 10:50 AM EDT by Manda Monzon      reg    (X) Parenteral or oral medications    10/19/2022 10:50 AM EDT by Manda Monzon      po    Interventions    (X) WBC    10/19/2022 10:50 AM EDT by Manda Monzon      6.3    Medications    (X) Possible DVT prophylaxis    10/19/2022 10:50 AM EDT by Manda Monzon      eliquis 5 mg  2xd    * Milestone   Additional Notes   DATE: 10/16/22             VITALS:   98.2 (36.8) 20 77 129/65  sp02 91% ra  pain  7             ABNL/PERTINENT LABS/RADIOLOGY/DIAGNOSTIC STUDIES:   H/h  11.8 / 37.3       PHYSICAL EXAM:   Constitutional: This is a well developed, well nourished, 18.5-24.9 - Normal 59y.o. year old female who is alert, oriented, cooperative and in no apparent distress. Head:normocephalic and atraumatic.      Respiratory:  Breath sounds bilaterally were clear to auscultation. There were no wheezes, rhonchi or rales. There is no intercostal retraction or use of accessory muscles. Cardiovascular: Regular without murmur, clicks, gallops or rubs. Abdomen: Slightly rounded and soft without organomegaly. No rebound, rigidity or guarding was appreciated. Musculoskeletal: No gross muscle weakness. Extremities:  No lower extremity edema, ulcerations, tenderness, varicosities or erythema. Muscle size, tone and strength are normal.  No involuntary movements are noted. Skin:  Warm and dry. Good color, turgor and pigmentation. No lesions or scars. No cyanosis or clubbing   Neurological/Psychiatric: The patient's general behavior, level of consciousness, thought content and emotional status is normal.              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 - Relapse in last relapse in 2014, lost to follow-up since   - Metastatic relapse again, biopsy confirmed ovarian cancer recurrence with intra-abdominal and splenic involvement as well as lung metastasis   - Currently on active chemotherapy last treatment on 09/29/2022, hematology/oncology continues to follow the patient. - Further cancer treatment outpatient with oncology.        Subclinical seizures- controlled   - Neurology recommends continuing Keppra 750 mg twice daily, Vimpat 150 mg twice daily and Lamictal 125 mg twice daily.   - MRI brain shows no acute infarct or hemorrhage and stable small foci of right occipital encephalomalacia.   -Follow-up MRI brain is stable compared to prior image, no obvious evidence of PRES, no evidence of metastatic lesions to the brain.   - Seizures well-controlled last seizure 9/06   - Outpatient follow-up with neurology in 4 to 6 weeks. Aspiration pneumonia-resolved   - Possibly secondary to altered mentation and acute metabolic encephalopathy   - Completed course of levaquin   - Supplemental oxygen therapy as needed. - Continue nebulizer treatments and incentive spirometry   - Aspiration precautions       Primary hypertension   - Started on Norvasc 10 mg daily, blood pressure controlled   - Monitor blood pressure      History of DVT/PE   - Continue anticoagulation with Eliquis 5 mg twice daily      Depression and hallucinations   - Discontinued Seroquel. Started olanzapine per psychiatry   -Continue Zoloft          Neurology   Reports hallucinations are becoming less frequent and feels overall better. Characteristic of hallucinations unchanged from prior. Patient is alert, awake, oriented x3. Speech is fluent and coherent. Followed three-step commands well. She has difficulty with serial subtractions but she could recall name of the months in the reverse order well. She could recall 2 out of 3 test items without any difficulty on formal memory testing. Good naming and good repetition. No dysarthria noted. Normal language. Impression and Plan: Ms. Mary Alice Santacruz is a 59 y.o. female with    New onset progressive hallucinations with history of recurrent ovarian carcinoma: Serum NMDA antibody panel sent; discussion done about CSF studies for autoimmune encephalitis panel; but given the significant improvement in patient's mentation without any evidence of dementing process; CSF studies were held at this point of time. If hallucinations do not improve, memory difficulties worsen; might need CSF study for NMDA panel and subsequently to be started on IV steroid therapy.        Seizure disorder: Keppra dose is slightly reduced from 1000 bid to 750 bid and Vimpat dose is increased from 100 bid to 150 twice daily. To continue Lamictal 125 mg bid. Psychiatry consult input noted; Seroquel switched to Zyprexa. Repeat brain MRI did not show any evidence of posterior reversible encephalopathy and only showed old infarcts. Norvasc  10 mg daily,  eliquis 5 mg  2xd, pepcid 20 mg   x1,  vimpat  150 mg  2xd ,  lamictal 125 mg  2xd , keppra 750 mg  2xd    lopressor 25 mg  2xd,    ativan 1 mg q4hprn x3   percocet  1 tab q4hprn x42 zyprexa 5 mg  nightly,  imodium 2 mg  3xd prn x2,        ORDERS:   Up with assist ,  pt.ot,   I and o,  vs,  telemetry , epc,          PT/OT/SLP/CM ASSESSMENT OR NOTES:   Physical Therapy   Patient would benefit from continued therapy after discharge    Body Structures, Functions, Activity Limitations Requiring Skilled Therapeutic Intervention: Decreased functional mobility ; Decreased strength;Decreased cognition;Decreased safe awareness;Decreased endurance;Decreased balance   Assessment: Pt amb 100 RW x3  with CG A, Pt continue to be impulsive and unsafe amb without assist. Pt lacks insight to deficits making her a high fall risk. Recomending continued therapy to address deficits. Pt will require 24hrs supervision and assistance with mobility upon discahrge.    Therapy Prognosis: Good                     Sepsis and Other Febrile Illness, without Focal Infection - Care Day 13 (10/15/2022) by Osmany Davis RN       Review Status Review Entered   Completed 10/19/2022 1038       Created By   Osmany Davis RN      Criteria Review      Care Day: 13 Care Date: 10/15/2022 Level of Care: Intermediate Care    Guideline Day 3    Level Of Care    (X) Floor    10/19/2022 10:38 AM EDT by Marco Antonio Lozano      intermediate    Clinical Status    ( ) * Mental status at baseline    (X) * Afebrile or fever improved    10/19/2022 10:38 AM EDT by Marco Antonio Lozano      98.2 (36.8)    Activity    (X) Activity as tolerated    10/19/2022 10:38 AM EDT by Madisyn Bonds Lakisha Muloksana      up with assist    Routes    (X) * Oral hydration    (X) Diet as tolerated    10/19/2022 10:38 AM EDT by Sahil Lam      reg diet    (X) Parenteral or oral medications    10/19/2022 10:38 AM EDT by Sahil Lam      po    Interventions    (X) WBC    10/19/2022 10:38 AM EDT by Sahil Lam      7.3    Medications    (X) Possible DVT prophylaxis    10/19/2022 10:38 AM EDT by Sahil Lam      eliquis 5 mg  2xd    * Milestone   Additional Notes   DATE: 1015/22             VITALS:   98.2 (36.8) 21 86 152/82  02 1L ns sp02 97%   pain  4   wt 143 lbs             ABNL/PERTINENT LABS/RADIOLOGY/DIAGNOSTIC STUDIES:   K 3.5   Bun 18   Cr 0.97   Gfr >60   Glucose 132   H/h 11.8/37.9         PHYSICAL EXAM:   Constitutional: This is a well developed, well nourished, 18.5-24.9 - Normal 59y.o. year old female who is alert, oriented, cooperative and in no apparent distress. Head:normocephalic and atraumatic. Respiratory:  Breath sounds bilaterally were clear to auscultation. There were no wheezes, rhonchi or rales. There is no intercostal retraction or use of accessory muscles. Cardiovascular: Regular without murmur, clicks, gallops or rubs. Abdomen: Slightly rounded and soft without organomegaly. No rebound, rigidity or guarding was appreciated. Musculoskeletal:  No gross muscle weakness. Extremities:  No lower extremity edema, ulcerations, tenderness, varicosities or erythema. Muscle size, tone and strength are normal.  No involuntary movements are noted. Skin:  Warm and dry. Good color, turgor and pigmentation. No lesions or scars.   No cyanosis or clubbing   Neurological/Psychiatric: The patient's general behavior, level of consciousness, thought content and emotional status is normal.          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 - Relapse in last relapse in 2014, lost to follow-up since   - Metastatic relapse again, biopsy confirmed ovarian cancer recurrence with intra-abdominal and splenic involvement as well as lung metastasis   - Currently on active chemotherapy last treatment on 09/29/2022, hematology/oncology continues to follow the patient. - Further cancer treatment outpatient with oncology. Subclinical seizures- controlled   - Neurology recommends continuing Keppra 750 mg twice daily, Vimpat 150 mg twice daily and Lamictal 125 mg twice daily.   - MRI brain shows no acute infarct or hemorrhage and stable small foci of right occipital encephalomalacia.   -Follow-up MRI brain is stable compared to prior image, no obvious evidence of PRES, no evidence of metastatic lesions to the brain.   - Seizures well-controlled last seizure 9/06   - Outpatient follow-up with neurology in 4 to 6 weeks. Aspiration pneumonia-resolved   - Possibly secondary to altered mentation and acute metabolic encephalopathy   - Completed course of levaquin   - Supplemental oxygen therapy as needed. - Continue nebulizer treatments and incentive spirometry   - Aspiration precautions       Primary hypertension   - Started on Norvasc 10 mg daily, blood pressure controlled   - Monitor blood pressure      History of DVT/PE   - Continue anticoagulation with Eliquis 5 mg twice daily      Depression and hallucinations   - Discontinued Seroquel. Started olanzapine per psychiatry   -Continue Zoloft           Neurology   C/o persistent auditory hallucinations described as a constant echo of what people say and intermittent visual hallucinations described as a shadow or \"flash\" on peripheral vision but then turns and sees that nothing is there.     Denies any suicidal or homicidal ideations or hallucination. Denies any confusion. Complains of new cough, SOB, and yellow phlegm. No further seizures. Patient is alert, awake, oriented x3. Speech is fluent and coherent. Followed three-step commands well. She has difficulty with serial subtractions but she could recall name of the months in the reverse order well. She could recall 2 out of 3 test items without any difficulty on formal memory testing. Good naming and good repetition. No dysarthria noted. Normal language. With intact cognition, doubt autoimmune encephalitis. Therefore I did not request a spinal tap at this point of time. We will continue monitoring. New onset progressive hallucinations in the setting of recurrent ovarian carcinoma raising concern for possible autoimmune encephalitis; therefore, NMDA antibody panel sent. Seizure disorder: Keppra dose is slightly reduced from 1000 bid to 750 bid and Vimpat dose is increased from 100 bid to 150 twice daily. To continue Lamictal 125 mg bid. Psychiatry consult input noted; Seroquel switched to Zyprexa. Hem/onc    Oriented to person, place, and time. Patient complains of continued auditory hallucinations and is having visual hallucinations as well.       Mental status - alert and oriented   Eyes - pupils equal and reactive, extraocular eye movements intact   Ears - bilateral TM's and external ear canals normal   Nose - normal and patent, no erythema, discharge or polyps   Mouth - mucous membranes moist, pharynx normal without lesions   Neck - supple, no significant adenopathy   Lymphatics - no palpable lymphadenopathy, no hepatosplenomegaly   Chest - clear to auscultation, no wheezes, rales or rhonchi, symmetric air entry   Heart - normal rate, regular rhythm, normal S1, S2, no murmurs, rubs, clicks or gallops   Abdomen - soft, nontender, nondistended, no masses or organomegaly   Neurological - alert, oriented, normal speech, no focal findings or movement disorder noted   Musculoskeletal - no joint tenderness, deformity or swelling   Extremities - peripheral pulses normal, no pedal edema, no clubbing or cyanosis   Skin - normal coloration and turgor, no rashes, no suspicious skin lesions noted          1. Patient is currently on active chemotherapy treatment for ovarian cancer. Last treatment was September 29, 2022.   2. Further management for ovarian cancer will be as outpatient. 3. Patient's questions were answered to the best of her satisfaction and she verbalized full understanding and agreement.    Patient thinks symptoms aggravated/provoked after chemotherapy gemcitabine> gemcitabine can cause encephalopathy(confusion headache lethargy visual and neurologic disturbance) but unlikely isolated hallucination      MEDICATIONS:   Norvasc  10 mg daily,  eliquis 5 mg  2xd, pepcid 20 mg   x1,  vimpat  150 mg  2xd ,  lamictal 125 mg  2xd , keppra 750 mg  2xd    lopressor 25 mg  2xd,    ativan 1 mg q4hprn x1   percocet  1 tab q4hprn x4, zyprexa 5 mg  nightly,  imodium 2 mg  3xd prn x1, klor con 40 meq x1       ORDERS:   Up with assist ,  pt.ot,   I and o,  vs,  telemetry , epc,       PT/OT/SLP/CM ASSESSMENT OR NOTES:   Dc plan   snf             Sepsis and Other Febrile Illness, without Focal Infection - Care Day 11 (10/13/2022) by Nnaci Potter RN       Review Status Review Entered   Completed 10/19/2022 1020       Created By   Nanci Potter RN      Criteria Review      Care Day: 11 Care Date: 10/13/2022 Level of Care: Intermediate Care    Guideline Day 2    Level Of Care    (X) ICU or floor    10/19/2022 10:20 AM EDT by Flora Pollock      intermediate    Clinical Status    (X) * Hemodynamic stability    10/19/2022 10:20 AM EDT by Flora Pollock      141/68  85    (X) * Hypoxemia absent    10/19/2022 10:20 AM EDT by Flora Pollock      sp02 92%  ra    (X) * Tachypnea absent    10/19/2022 10:20 AM EDT by Flora Pollock      rr  16-18    Activity (X) Activity as tolerated    10/19/2022 10:20 AM EDT by Idania Tamayo      up with assist   pt/ot    Routes    (X) Parenteral or oral medications    10/19/2022 10:20 AM EDT by Idania Tamayo      po    (X) Diet as tolerated    10/19/2022 10:20 AM EDT by Idania Tamayo      reg diet    Interventions    (X) WBC    10/19/2022 10:20 AM EDT by Idania Tamayo      9.1    Medications    (X) Possible DVT prophylaxis    10/19/2022 10:20 AM EDT by Idania Tamayo      eliquis 5 mg  2xd    * Milestone   Additional Notes   DATE:  10/13/22             VITALS:   98.1 85  18  141/68  sp02 92%  ra        ABNL/PERTINENT LABS/RADIOLOGY/DIAGNOSTIC STUDIES:   Bun 20   Cr 1.11   Gfr 57    Glucose 109   H/h 11.3 / 35.6       PHYSICAL EXAM:   Constitutional: This is a well developed, well nourished, 25-29.9 - Overweight 59y.o. year old female who is alert, oriented, cooperative and in no apparent distress. Head:normocephalic and atraumatic. Respiratory: Breath sounds bilaterally were clear to auscultation. There were no wheezes, rhonchi or rales. There is no intercostal retraction or use of accessory muscles. Cardiovascular: Regular without murmur, clicks, gallops or rubs. Abdomen: Slightly rounded and soft without organomegaly. No rebound, rigidity or guarding was appreciated. Musculoskeletal:  No gross muscle weakness. Extremities:  No lower extremity edema, ulcerations, tenderness, varicosities or erythema. Muscle size, tone and strength are normal.  No involuntary movements are noted. Skin:  Warm and dry. Good color, turgor and pigmentation. No lesions or scars. No cyanosis or clubbing   Neurological/Psychiatric: The patient's general behavior, level of consciousness, thought content and emotional status is normal.                MD CONSULTS/ASSESSMENT AND PLAN:   Medicine    Patient is still having some on and off hallucinations.   Discussed with neurology and they will reevaluate the patient as this could be side effect of some of her antiepileptics. Psychiatric evaluation      No acute events overnight. Hemodynamically stable, afebrile maintaining saturation on room air. Sleep is improved and no active symptoms of headaches. No episodes of seizures. Ongoing auditory hallucinations. Hematology/oncology will continue chemotherapy outpatient. Neurology will continue on on the same antiseizure medications and follow outpatient in 4 to 6 weeks time. Psychiatry consulted for ongoing hallucinations. 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 - Relapse in last relapse in 2014, lost to follow-up since   - Metastatic relapse again, biopsy confirmed ovarian cancer recurrence with intra-abdominal and splenic involvement as well as lung metastasis   - Currently on active chemotherapy last treatment on 09/29/2022, hematology/oncology continues to follow the patient. - Further cancer treatment outpatient with oncology. Acute metabolic encephalopathy secondary to seizures- resolved   - Possibly secondary to seizure-like activity   - Neurology consulted, LTME discontinued, suggests underlying structural defect and increased risk for focal onset seizures. - Mentation improved   - Continue Seroquel 25 mg twice daily       Subclinical seizures- controlled   - Neurology consulted recommends Keppra 1 g twice daily, Vimpat 100 mg twice daily and Lamictal 125 mg twice daily.   - MRI brain shows no acute infarct or hemorrhage and stable small foci of right occipital encephalomalacia.   -Follow-up MRI brain is stable compared to prior image, no obvious evidence of PRES, no evidence of metastatic lesions to the brain.   - Seizures well-controlled last seizure 9/06   - Outpatient follow-up with neurology in 4 to 6 weeks.       Aspiration pneumonia-resolved   - Possibly secondary to altered mentation and acute metabolic encephalopathy   -Completed course of levaquin   - Supplemental oxygen therapy as needed. - Continue nebulizer treatments and incentive spirometry   - Aspiration precautions       Primary hypertension   - Started on Norvasc 10 mg daily, blood pressure trolled   - Monitor blood pressure      History of DVT/PE   - Continue anticoagulation with Eliquis 5 mg twice daily      Acute diarrhea- resolved   - 2 episodes of loose stools yesterday, previously constipation            Neurology     Patient reported on-going auditory hallucinations. She stays alert and oriented x 3. Denied any other symptoms. Mental status     Patient reported on-going auditory hallucinations. No hallucinations noted at this time. She was alert and oriented x 3; has been following simple commands   Cranial nerves Grossly intact   Motor function Strength: Patient has been using all limbs purposefully and equally   Normal bulk and tone                Sensory function Grossly intact       Cerebellar No visible tremors   Reflex function Intact       PLAN:   Will increase the dose of Vimpat 150 mg BID PO (previously 100 mg BID) & decrease the dose of Keppra 750 mg BID PO (previously 1 g BID) & Lamictal 125 mg BID       Continue Seroquel 25 mg BID       Continue PT/OT; he ambulated 180' with RW            Nutrition Recommendations/Plan:    1. Continue current diet with chocolate Ensure oral supplements with all meals. Encourage/monitor PO intakes as tolerated. 2. Will monitor labs, weights, and plan of care. Psychiatry    Patient reports that she is having hallucinations but upon clarification these hallucinations are approximately 30 to 60 seconds repetitive loops of something that somebody just said. She states it is like the same thing is being repeated on a loop over and over and over. They come in burst in the last approximately half a minute to 1 minute.   It is quite possible that this is due to seizure activity. They are not mood congruent voices but clearly repeated statements that were heard just prior to the event. She does find them distressing. After discussion of risk benefits and alternatives we decided to continue with her Zoloft but switch her Seroquel to olanzapine. Thought process is that olanzapine is a higher D2 blocker and still FDA approved augment for treatment of depression. Did discuss with the patient the possibility that D2 blockade may not be the issue with these hallucinations if they in fact are secondary to epileptic activity, patient expresses understanding this and wishes to proceed with olanzapine for augmentation of mood, the mere possibility that it could help with voices, as well as desirable side effects of sleep and appetite stimulation. DSM-5 DIAGNOSIS:         Recurrent severe major depression without psychosis   Rule out with psychosis   Likely auditory hallucinations secondary to epileptic activity       PLAN:     Will discontinue Seroquel and add olanzapine at bedtime   Continue with Zoloft      MEDICATIONS:   Norvasc  10 mg daily,  eliquis 5 mg  2xd, pepcid 20 mg   x1,  vimpat 100 mg 2xd increased to 150 mg  2xd ,  lamictal 125 mg  2xd , keppra 1 g m  2xd,reduced to 750 mg  2xd    lopressor 25 mg  2xd,    ativan 1 mg q4hprn x2   percocet  1 tab q4hprn x4, zyprexa 5 mg  nightly,  imodium 2 mg  3xd prn x1,    Zofran 4 mg odt q8hprn x1,    ORDERS:   Up with assist ,  pt.ot,   I and o,  vs,  telemetry , epc,          PT/OT/SLP/CM ASSESSMENT OR NOTES:   Occupational Therapy   Patient would benefit from continued therapy after discharge   Performance deficits / Impairments: Decreased functional mobility ; Decreased ADL status; Decreased high-level IADLs;Decreased safe awareness;Decreased balance;Decreased cognition;Decreased coordination   Assessment: Pt would benefit from continued acute care and post acute care OT to address above listed deficits.    Treatment Diagnosis: Septicemia   Prognosis: Fair      Dc plan  SNF           Physical Therapy   Patient would benefit from continued therapy after discharge    Body Structures, Functions, Activity Limitations Requiring Skilled Therapeutic Intervention: Decreased functional mobility ; Decreased strength;Decreased cognition;Decreased safe awareness;Decreased endurance;Decreased balance   Assessment: Pt amb 90ft x2 RW with CG A, Pt continue to be impulsive and unsafe amb without assist. . Pt has no insight to deficits making her a high fall risk. Recomending continued therapy to address deficits. Pt will require 24hrs supervision and assistance with mobility upon discahrge.    Therapy Prognosis: Good              Sepsis and Other Febrile Illness, without Focal Infection - Care Day 9 (10/11/2022) by Bethel Denise RN       Review Status Review Entered   Completed 10/19/2022 1009       Created By   Bethel Denise RN      Criteria Review      Care Day: 9 Care Date: 10/11/2022 Level of Care: Intermediate Care    Guideline Day 2    Level Of Care    (X) ICU or floor    10/19/2022 10:09 AM EDT by Herminia Easley      intermediate    Clinical Status    (X) * Hemodynamic stability    10/19/2022 10:09 AM EDT by Herminia Easley      138/67    (X) * Hypoxemia absent    10/19/2022 10:09 AM EDT by Herminia Easley      sp02  93%  ra   RR  25    ( ) * Tachypnea absent    10/19/2022 10:09 AM EDT by Herminia Easley      rr 25    Activity    (X) Activity as tolerated    10/19/2022 10:09 AM EDT by Herminia Easley      up with assist   pt/ot    Routes    (X) Parenteral or oral medications    10/19/2022 10:09 AM EDT by Herminia Easley      po    (X) Diet as tolerated    10/19/2022 10:09 AM EDT by Herminia Easley      reg diet    Interventions    (X) WBC    10/19/2022 10:09 AM EDT by Herminia Easley      9.4    Medications    (X) Possible DVT prophylaxis    10/19/2022 10:09 AM EDT by Herminia Easley      eliquis 5 mg   2xd    * Milestone   Additional Notes DATE: 10/11/22             VITALS:   98.1  93   25   138/67   sp02 93%  ra       ABNL/PERTINENT LABS/RADIOLOGY/DIAGNOSTIC STUDIES:   K 3.3   Bun 18   Cr 1.10   Gfr 58   Glucose 124   H/h 11.3 / 33.3             PHYSICAL EXAM:   Constitutional: This is a well developed, well nourished, 25-29.9 - Overweight 59y.o. year old female who is alert, oriented, cooperative and in no apparent distress. Head:normocephalic and atraumatic. Respiratory: Breath sounds bilaterally were clear to auscultation. There were no wheezes, rhonchi or rales. There is no intercostal retraction or use of accessory muscles. Cardiovascular: Regular without murmur, clicks, gallops or rubs. Abdomen: Slightly rounded and soft without organomegaly. No rebound, rigidity or guarding was appreciated. Musculoskeletal:  No gross muscle weakness. Extremities:  No lower extremity edema, ulcerations, tenderness, varicosities or erythema. Muscle size, tone and strength are normal.  No involuntary movements are noted. Skin:  Warm and dry. Good color, turgor and pigmentation. No lesions or scars. No cyanosis or clubbing   Neurological/Psychiatric: The patient's general behavior, level of consciousness, thought content and emotional status is normal.                MD CONSULTS/ASSESSMENT AND PLAN:   Medicine    No acute events overnight   Afebrile, hemodynamically stable, saturating well on room air   Patient still complaining of auditory hallucinations   Patient had 2 episodes of diarrhea yesterday evening, no complaints today   She denies any nausea or vomiting, chest pain or any shortness of breath   Potassium 2.9 -->3.7   MRI brain similar compared to the previous one, no signs of PRES or metastasis to brain.    Lasix discontinued, evaluate need as an outpatient      Acute metabolic encephalopathy   - Possibly secondary to opioid use versus possible seizure-like activity   - Neurology consulted, LTME discontinued, suggests underlying structural defect and increased risk for focal onset seizures. -Mentation improved, ongoing neurology work-up. - Seroquel 25 mg twice daily       Subclinical seizures   - Neurology consulted recommends Keppra 1 g twice daily, Vimpat 100 mg twice daily and Lamictal 125 mg twice daily.   - MRI brain shows no acute infarct or hemorrhage and stable small foci of right occipital encephalomalacia.   -Follow-up MRI brain is stable compared to prior image, no obvious evidence of PRES, no evidence of metastatic lesions to the brain. Aspiration pneumonia   - Possibly secondary to altered mentation and acute metabolic encephalopathy   - Finished course of levaquin   - Supplemental oxygen therapy as needed. 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 - Relapse in last relapse in 2014, lost to follow-up since   - Metastatic relapse again, biopsy confirmed ovarian cancer recurrence with intra-abdominal and splenic involvement as well as lung metastasis   - Currently on active chemotherapy last treatment on 09/29/2022, hematology/oncology continues to follow the patient. Primary hypertension   - Started on Norvasc 10 mg daily   - Monitor blood pressure      History of DVT/PE   - Continue anticoagulation with Eliquis 5 mg twice daily          Neurology   Repeat brain MRI did not show any evidence of posterior reversible encephalopathy and only showed old infarcts. Seizure disorder: Well-controlled with last seizure on 10/6/2022; continue Vimpat, Keppra and Lamictal at present dose. Mental status     Patient reported that auditory hallucinations are getting better. No hallucinations noted at this time.  She was alert and oriented x 3; has been following simple commands   Cranial nerves Grossly intact   Motor function Strength: Patient has been using all limbs purposefully and equally   Normal bulk and tone                Sensory function Grossly intact       Cerebellar No visible tremors   Reflex function Intact       IMPRESSION: 59 y.o.  female admitted with   Acute metabolic encephalopathy in the setting of HTN urgency (150/122 mm Hg), febrile illness with +SIRS criteria, aspiration pneumonia & subclinical status. Pt was A&Ox3 today; reported that her auditory hallucinations are getting better over time       Repeat MRI brain w/wo (10/9) - no PRES or mets; small old infarcts in L posterior frontal lobe & b/l occipital lobes (R>L) LTME - total of 18 subclinical seizures along with mild to moderate encephalopathy (last seizure on 10/6/2022)       Prior Hx PRES with new onset seizure disorder in 10/2020       Chronic DVT; on Eliquis 5 mg BID                   MEDICATIONS:   Norvasc  10 mg daily,  eliquis 5 mg  2xd, pepcid 20 mg   x1,  vimpat 100 mg   2d,  lamictal 125 mg  2xd , keppra 1 g m  2xd,  lopressor 25 mg  2xd,  klor con  10 meq x1,  ativan 1 mg q4hprn x3   percocet  1 tab q4hprn x3 ,       ORDERS:   Up with assist ,  pt.ot,   I and o,  vs,  telemetry , epc,       PT/OT/SLP/CM ASSESSMENT OR NOTES:   Occupational Therapy   Patient would benefit from continued therapy after discharge   Performance deficits / Impairments: Decreased functional mobility ; Decreased ADL status; Decreased high-level IADLs;Decreased safe awareness;Decreased balance;Decreased cognition;Decreased coordination   Assessment: Pt would benefit from continued acute care and post acute care OT to address above listed deficits. Treatment Diagnosis: Septicemia   Prognosis: Fair      Physical Therapy   Body Structures, Functions, Activity Limitations Requiring Skilled Therapeutic Intervention: Decreased functional mobility ; Decreased strength;Decreased cognition;Decreased safe awareness;Decreased endurance;Decreased balance   Assessment: Pt amb 80ft x2 RW with CG A, Pt continue to be impulsive and unsafe amb without assist. Limited by impulsiveness and poor safety awareness. Pt has no insight to deficits making her a high fall risk. Recomending continued therapy to address deficits. Pt will require 24hrs supervision and assistance with mobility upon discahrge.    Therapy Prognosis: Good

## 2022-10-20 ENCOUNTER — TELEPHONE (OUTPATIENT)
Dept: INFUSION THERAPY | Facility: MEDICAL CENTER | Age: 57
End: 2022-10-20

## 2022-10-20 ENCOUNTER — HOSPITAL ENCOUNTER (OUTPATIENT)
Dept: INFUSION THERAPY | Facility: MEDICAL CENTER | Age: 57
Discharge: HOME | End: 2022-10-20

## 2022-10-20 LAB
SEND OUT REPORT: NORMAL
TEST NAME: NORMAL

## 2022-10-20 NOTE — TELEPHONE ENCOUNTER
Writer called patient to see if she was aware of her chemotherapy and MD visit today at 10 am.  Patient states she was not aware she had an appointment today. Writer notes patient has an appointment scheduled next week on Thursday, MD visit added to next week.    Patient and patients daughter aware of appointment next Thursday, 10/27 at 10 am.

## 2022-10-23 NOTE — DISCHARGE SUMMARY
[address]     Department of Internal Medicine - Staff Internal Medicine Teaching Service    INPATIENT DISCHARGE SUMMARY      Patient Identification:  Julia Calabrese is a 59 y.o. female. :  1965  MRN: [id number]     Acct: [de-identified]   PCP: Archibald Meigs, DO  Admit Date:  10/3/2022  Discharge date and time: 10/17/2022  2:32 PM   Attending Provider: No att. providers found                                     [facility] Problem Lists:  Principal Problem:    Septicemia ([address]  Active Problems:    Hallucinations    Malignant neoplasm of ovary ([address]    Seizure ([address]    Acute encephalopathy    Seizure disorder, status epilepticus, nonconvulsive ([address]    Cancer, metastatic to bone ([address]    Chronic deep vein thrombosis (DVT) of femoral vein of left lower extremity (HCC)    Iron deficiency anemia secondary to inadequate dietary iron intake    AMS (altered mental status)    Gastroesophageal reflux disease    Metabolic encephalopathy    Breakthrough seizure ([address]  Resolved Problems:    Type 2 diabetes mellitus without complication, without long-term current use of insulin [facility])      HOSPITAL STAY     Brief Inpatient course:   Julia Calabrese is a 59 y.o. female who was admitted for the management of Septicemia [facility]), presented to the emergency department with altered mental status. In the ER she was meeting SIRS criteria and decision was made to intubate patient for airway protection. She was transferred to ICU and started on IV fluids and pressors for hypotension. CT scan of the chest showed bilateral pulmonary infiltrates, she was started on broad-spectrum antibiotics. Neurology was consulted due to her history of seizures, heme-onc was consulted for patient history of recurrent ovarian cancer with peritoneal and lung metastasis currently on active chemotherapy.   Also has a history of intra-abdominal bleeding secondary to splenic mass with GI infiltration s/p embolization of the spleen. LTME showed 6 subclinical seizures over 24 hours with left hand flapping and left eye deviation, she was started on Keppra, Vimpat and Lamictal.    Transferred out of the ICU on 10/7/2022, she continued to have auditory hallucinations for which she was started on Seroquel. MRI brain was unremarkable. Psychiatry was consulted for hallucinations, Seroquel was discontinued and she was started on Zyprexa and her antiseizure medications were adjusted accordingly. Patient remained stable, all her medications were optimized. She was discharged once medically cleared as per instructions given below. Procedures/ Significant Interventions:      LTME  Endotracheal intubation  MRI Brain  CT Chest  CT Head    Consults:     Consults:     Final Specialist Recommendations/Findings:   IP CONSULT TO CRITICAL CARE  IP CONSULT TO GENERAL SURGERY  IP CONSULT TO PALLIATIVE CARE  IP CONSULT TO SOCIAL WORK  IP CONSULT TO NEUROLOGY  IP CONSULT TO GI  IP CONSULT TO NEUROCRITICAL CARE  IP CONSULT TO HEM/ONC  IP CONSULT TO CASE MANAGEMENT  IP CONSULT TO DIETITIAN  IP CONSULT TO HOME CARE NEEDS  IP CONSULT TO NEUROLOGY  IP CONSULT TO PSYCHIATRY      Any Hospital Acquired Infections: none    Discharge Functional Status:  stable    DISCHARGE PLAN     Disposition: [facility]    Patient Instructions:   Discharge Medication List as of 10/17/2022  2:32 PM        START taking these medications    Details   OLANZapine (ZYPREXA) 5 MG tablet Take 1 tablet by mouth nightly, Disp-30 tablet, R-0Normal      amLODIPine (NORVASC) 10 MG tablet Take 1 tablet by mouth daily, Disp-30 tablet, R-3Normal      metoprolol tartrate (LOPRESSOR) 25 MG tablet Take 1 tablet by mouth 2 times daily, Disp-60 tablet, R-3Normal      miconazole (ZEASORB-AF) 2 % powder Apply topically 2 times daily. , Disp-45 g, R-1Normal           CONTINUE these medications which have CHANGED    Details   lacosamide (VIMPAT) 150 MG TABS tablet Take 1 tablet by mouth 2 times daily. , Disp-60 tablet, R-3Print      levETIRAcetam (KEPPRA) 750 MG tablet Take 1 tablet by mouth 2 times daily, Disp-60 tablet, R-3Normal           CONTINUE these medications which have NOT CHANGED    Details   STIMULANT LAXATIVE 8.6-50 MG per tablet TAKE 2 TABLETS BY MOUTH NIGHTLY., Disp-60 tablet, R-2Normal      !! morphine (MS CONTIN) 15 MG extended release tablet TAKE 1 TABLET BY MOUTH 2 TIMES DAILY FOR 30 DAYS., Disp-60 tablet, R-0Normal      lamoTRIgine (LAMICTAL) 25 MG tablet Take 5 tablets by mouth 2 times daily, Disp-300 tablet, R-2Normal      LORazepam (ATIVAN) 1 MG tablet TAKE 1 TABLET BY MOUTH EVERY 8 HOURS AS NEEDED FOR ANXIETY FOR UP TO 30 DAYS., Disp-90 tablet, R-0Normal      !! morphine (MS CONTIN) 30 MG extended release tablet TAKE 1 TABLET BY MOUTH 2 TIMES DAILY FOR 30 DAYS., Disp-60 tablet, R-0Normal      sertraline (ZOLOFT) 100 MG tablet TAKE 1 TABLET BY MOUTH DAILY, Disp-30 tablet, R-4Normal      FEROSUL 325 (65 Fe) MG tablet TAKE 1 TABLET BY MOUTH ONCE DAILY WITH BREAKFAST, Disp-30 tablet, R-2Normal      pantoprazole (PROTONIX) 40 MG tablet Take 1 tablet by mouth daily, Disp-30 tablet, R-3Normal      Lactobacillus (ACIDOPHILUS) CAPS capsule TAKE 1 TABLET BY MOUTH ONCE DAILY IN THE MORNING AND 1 TABLET BEFORE BEDTIME, Disp-60 capsule, R-5Normal      dicyclomine (BENTYL) 20 MG tablet TAKE 1 TABLET (20 MG TOTAL) BY MOUTH IN THE MORNING AND 1 TABLET (20 MG TOTAL) BEFORE BEDTIME., Disp-60 tablet, R-3Normal      Handicap Placard MISC Disp-1 each, R-0, Print5 years      loperamide (IMODIUM) 2 MG capsule Take 2 mg by mouth 4 times daily as needed for DiarrheaHistorical Med      ondansetron (ZOFRAN-ODT) 4 MG disintegrating tablet Take 1 tablet by mouth every 8 hours as needed for Nausea or Vomiting, Disp-60 tablet, R-2Normal      senna (SENOKOT) 8.6 MG tablet Take 1 tablet by mouth 2 times daily, Disp-60 tablet, R-11Normal      melatonin 5 MG TABS tablet Take 1 tablet by mouth daily, Disp-30 tablet, R-3Normal      benzonatate (TESSALON PERLES) 100 MG capsule Take 1 capsule by mouth 3 times daily as needed for Cough, Disp-30 capsule, R-1Normal      apixaban (ELIQUIS) 5 MG TABS tablet Take 1 tablet by mouth 2 times daily, Disp-60 tablet, R-5Normal      hydrocortisone (ANUSOL-HC) 2.5 % CREA rectal cream Apply on affected area twice daily, Disp-1 each, R-1, Normal       !! - Potential duplicate medications found. Please discuss with provider. STOP taking these medications       QUEtiapine (SEROQUEL) 25 MG tablet Comments:   Reason for Stopping:         potassium chloride (KLOR-CON M) 10 MEQ extended release tablet Comments:   Reason for Stopping:         oxyCODONE-acetaminophen (PERCOCET) 5-325 MG per tablet Comments:   Reason for Stopping:         furosemide (LASIX) 20 MG tablet Comments:   Reason for Stopping:         potassium chloride (MICRO-K) 10 MEQ extended release capsule Comments:   Reason for Stopping:               Activity: activity as tolerated    Diet: regular diet    Follow-up:    Pricila Alvarado DO  [address] [place] [address]  [phone]            Patient Instructions:     Please start taking norvasc 10 mg qd and lopressor 25 mg bid for blood pressure  Please add vimpat 100 mg bid for AED  Please take seraquel 25 mg bid due to hallucinations. Please f/u with your oncologist out patient and PCP  Please get BMP done in one week to f/u on potassium levels   Please use miconazole at area of rash (antifungal)        Sang Patel MD,   Internal Medicine Resident, PGY-1  [facility];  [place], [place]  10/23/2022, 5:14 PM

## 2022-10-26 ENCOUNTER — HOSPITAL ENCOUNTER (OUTPATIENT)
Facility: MEDICAL CENTER | Age: 57
DRG: 101 | End: 2022-10-26
Payer: COMMERCIAL

## 2022-10-27 ENCOUNTER — OFFICE VISIT (OUTPATIENT)
Dept: ONCOLOGY | Age: 57
DRG: 101 | End: 2022-10-27
Payer: COMMERCIAL

## 2022-10-27 ENCOUNTER — TELEPHONE (OUTPATIENT)
Dept: ONCOLOGY | Age: 57
End: 2022-10-27

## 2022-10-27 ENCOUNTER — HOSPITAL ENCOUNTER (OUTPATIENT)
Dept: INFUSION THERAPY | Facility: MEDICAL CENTER | Age: 57
Discharge: HOME | DRG: 101 | End: 2022-10-27
Payer: COMMERCIAL

## 2022-10-27 VITALS
BODY MASS INDEX: 25.59 KG/M2 | HEART RATE: 73 BPM | TEMPERATURE: 98.9 F | DIASTOLIC BLOOD PRESSURE: 70 MMHG | RESPIRATION RATE: 16 BRPM | SYSTOLIC BLOOD PRESSURE: 117 MMHG | WEIGHT: 153.8 LBS

## 2022-10-27 DIAGNOSIS — C79.51 CANCER, METASTATIC TO BONE (HCC): ICD-10-CM

## 2022-10-27 DIAGNOSIS — C56.9 MALIGNANT NEOPLASM OF OVARY, UNSPECIFIED LATERALITY (HCC): Primary | ICD-10-CM

## 2022-10-27 DIAGNOSIS — C79.60 MALIGNANT NEOPLASM METASTATIC TO OVARY, UNSPECIFIED LATERALITY (HCC): ICD-10-CM

## 2022-10-27 DIAGNOSIS — C56.9 MALIGNANT NEOPLASM OF OVARY, UNSPECIFIED LATERALITY (HCC): ICD-10-CM

## 2022-10-27 LAB
ABSOLUTE EOS #: 0.19 K/UL (ref 0–0.44)
ABSOLUTE IMMATURE GRANULOCYTE: 0.03 K/UL (ref 0–0.3)
ABSOLUTE LYMPH #: 1.41 K/UL (ref 1.1–3.7)
ABSOLUTE MONO #: 0.63 K/UL (ref 0.1–1.2)
ALBUMIN SERPL-MCNC: 3.7 G/DL (ref 3.5–5.2)
ALP BLD-CCNC: 88 U/L (ref 35–104)
ALT SERPL-CCNC: 6 U/L (ref 5–33)
ANION GAP SERPL CALCULATED.3IONS-SCNC: 11 MMOL/L (ref 9–17)
AST SERPL-CCNC: 12 U/L
BASOPHILS # BLD: 1 % (ref 0–2)
BASOPHILS ABSOLUTE: 0.07 K/UL (ref 0–0.2)
BILIRUB SERPL-MCNC: 0.1 MG/DL (ref 0.3–1.2)
BUN BLDV-MCNC: 13 MG/DL (ref 6–20)
BUN/CREAT BLD: 17 (ref 9–20)
CA 125: 356 U/ML
CALCIUM SERPL-MCNC: 8.8 MG/DL (ref 8.6–10.4)
CHLORIDE BLD-SCNC: 104 MMOL/L (ref 98–107)
CO2: 28 MMOL/L (ref 20–31)
CREAT SERPL-MCNC: 0.77 MG/DL (ref 0.5–0.9)
EOSINOPHILS RELATIVE PERCENT: 2 % (ref 1–4)
GFR SERPL CREATININE-BSD FRML MDRD: >60 ML/MIN/1.73M2
GLUCOSE BLD-MCNC: 145 MG/DL (ref 70–99)
HCT VFR BLD CALC: 40.7 % (ref 36.3–47.1)
HEMOGLOBIN: 12.2 G/DL (ref 11.9–15.1)
IMMATURE GRANULOCYTES: 0 %
LYMPHOCYTES # BLD: 17 % (ref 24–43)
MCH RBC QN AUTO: 29.5 PG (ref 25.2–33.5)
MCHC RBC AUTO-ENTMCNC: 30 G/DL (ref 28.4–34.8)
MCV RBC AUTO: 98.3 FL (ref 82.6–102.9)
MONOCYTES # BLD: 8 % (ref 3–12)
NRBC AUTOMATED: 0 PER 100 WBC
PDW BLD-RTO: 16.7 % (ref 11.8–14.4)
PLATELET # BLD: 349 K/UL (ref 138–453)
PMV BLD AUTO: 9.1 FL (ref 8.1–13.5)
POTASSIUM SERPL-SCNC: 3.6 MMOL/L (ref 3.7–5.3)
RBC # BLD: 4.14 M/UL (ref 3.95–5.11)
RBC # BLD: ABNORMAL 10*6/UL
SEG NEUTROPHILS: 72 % (ref 36–65)
SEGMENTED NEUTROPHILS ABSOLUTE COUNT: 5.91 K/UL (ref 1.5–8.1)
SODIUM BLD-SCNC: 143 MMOL/L (ref 135–144)
TOTAL PROTEIN: 7.1 G/DL (ref 6.4–8.3)
WBC # BLD: 8.2 K/UL (ref 3.5–11.3)

## 2022-10-27 PROCEDURE — 99211 OFF/OP EST MAY X REQ PHY/QHP: CPT | Performed by: INTERNAL MEDICINE

## 2022-10-27 PROCEDURE — 1111F DSCHRG MED/CURRENT MED MERGE: CPT | Performed by: INTERNAL MEDICINE

## 2022-10-27 PROCEDURE — G8427 DOCREV CUR MEDS BY ELIG CLIN: HCPCS | Performed by: INTERNAL MEDICINE

## 2022-10-27 PROCEDURE — 6360000002 HC RX W HCPCS: Performed by: INTERNAL MEDICINE

## 2022-10-27 PROCEDURE — 3017F COLORECTAL CA SCREEN DOC REV: CPT | Performed by: INTERNAL MEDICINE

## 2022-10-27 PROCEDURE — 99214 OFFICE O/P EST MOD 30 MIN: CPT | Performed by: INTERNAL MEDICINE

## 2022-10-27 PROCEDURE — 4004F PT TOBACCO SCREEN RCVD TLK: CPT | Performed by: INTERNAL MEDICINE

## 2022-10-27 PROCEDURE — G8484 FLU IMMUNIZE NO ADMIN: HCPCS | Performed by: INTERNAL MEDICINE

## 2022-10-27 PROCEDURE — 80053 COMPREHEN METABOLIC PANEL: CPT

## 2022-10-27 PROCEDURE — 2580000003 HC RX 258: Performed by: INTERNAL MEDICINE

## 2022-10-27 PROCEDURE — 36591 DRAW BLOOD OFF VENOUS DEVICE: CPT

## 2022-10-27 PROCEDURE — 86304 IMMUNOASSAY TUMOR CA 125: CPT

## 2022-10-27 PROCEDURE — 85025 COMPLETE CBC W/AUTO DIFF WBC: CPT

## 2022-10-27 PROCEDURE — G8417 CALC BMI ABV UP PARAM F/U: HCPCS | Performed by: INTERNAL MEDICINE

## 2022-10-27 RX ORDER — HEPARIN SODIUM (PORCINE) LOCK FLUSH IV SOLN 100 UNIT/ML 100 UNIT/ML
500 SOLUTION INTRAVENOUS PRN
Status: DISPENSED | OUTPATIENT
Start: 2022-10-27 | End: 2022-10-27

## 2022-10-27 RX ORDER — SODIUM CHLORIDE 0.9 % (FLUSH) 0.9 %
5-40 SYRINGE (ML) INJECTION PRN
Status: ACTIVE | OUTPATIENT
Start: 2022-10-27 | End: 2022-10-27

## 2022-10-27 RX ADMIN — HEPARIN 500 UNITS: 100 SYRINGE at 11:58

## 2022-10-27 RX ADMIN — SODIUM CHLORIDE, PRESERVATIVE FREE 10 ML: 5 INJECTION INTRAVENOUS at 11:58

## 2022-10-27 NOTE — PROGRESS NOTES
Patient arrive ambulatory  for cycle 20 day 1 treatment and MD visit. Pt states that she is not feeling well today ans wishes to hold treatment today   Vitals as charted. Port accessed,  specimens sent   Labs reviewed. MD met with pt , will hold treatment today . Port flushed and heparinized with intact pete needle removed per protocol. Patient discharged .

## 2022-10-27 NOTE — PLAN OF CARE
Problem: Safety - Adult  Goal: Free from fall injury  Outcome: Completed     Problem: Chronic Conditions and Co-morbidities  Goal: Patient's chronic conditions and co-morbidity symptoms are monitored and maintained or improved  Outcome: Completed

## 2022-10-28 NOTE — PROGRESS NOTES
_      Chief Complaint   Patient presents with    Follow-up     DIAGNOSIS:       Recurrent ovarian cancer. Original diagnosis 2005 with multiple recurrences. Diffuse metastasis. CURRENT THERAPY:         Doxil/ Avastin started 9/18/2020. Avastin was discontinued due to recent GI bleeding. Status post recent vascular embolization  S/p seizure disorder. Gemzar started 2/26/2021. Interrupted due to hospitalization and problem with compliance. Evidence of disease progression on CT scan 2/22/22  Added Carboplatin to Gemzar March 2022      BRIEF CASE HISTORY:      Ms. Lambert Torre is a very pleasant 59 y.o. female with history of multiple co morbidities as listed. The patient seen in consultation for ovarian cancer with multiple recurrences. 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 She had surgical debulking and she had systemic treatment with Taxol and carboplatin for 6 cycles. Patient was in remission for about 2 years. She had a relapse in 2007 and treated with topotecan with good results. Patient relapsed again in 2008 and was treated with Taxol carboplatin. After 3 cycles of systemic chemotherapy she had problems with carboplatin so she finished 3 more cycles with Taxol. She did well again for 2 to 3 years until she had a relapse in 2012 and she had intraperitoneal chemotherapy treatment with Taxol. Patient was last seen by her oncologist in 2014 at which time she had relatively stable disease with normal tumor marker and no significant abnormal images. Patient moved to [place] after that and she was lost for oncology follow-ups. Patient was recently evaluated again here in [facility] because of abdominal discomfort. Re imaging confirmed metastatic relapse. Biopsy confirmed ovarian cancer recurrence.  Scan showed extensive intraabdominal and splenic involvement and lung mets. She has no symptoms at the present time. Patient denies smoking or alcohol drinking.l    INTERIM HISTORY:    patient is seen for follow up recurrent ovarian cancer. Started on Gemzar. Treatment was inturrupted due to sickness and hospitalization. No melena or hematochezia. No hematemesis. No recent seizure activities. Labs are stable as well. No severe anemia. No active bleeding. PAST MEDICAL HISTORY: has a past medical history of Anemia, Bleeding, Cervical cancer ([facility] [address] Depression, Diabetes mellitus ([facility] [address] GERD (gastroesophageal reflux disease), Hx of blood clots, Hypertension, Metastatic cancer ([facility] [address] Ovarian cancer ([facility] [address] Post chemo evaluation, PRES (posterior reversible encephalopathy syndrome), and Splenic lesion. PAST SURGICAL HISTORY: has a past surgical history that includes Hysterectomy, total abdominal; Port Surgery; Tonsillectomy; IR PORT PLACEMENT > 5 YEARS (08/24/2020); Anus surgery; Abscess Drainage (2013); colectomy (03/2013); IR EMBOLIZATION HEMORRHAGE (10/05/2020); and Cardiac catheterization. CURRENT MEDICATIONS:  has a current medication list which includes the following prescription(s): lacosamide, levetiracetam, olanzapine, amlodipine, metoprolol tartrate, zeasorb-af, stimulant laxative, morphine, lamotrigine, pantoprazole, acidophilus, dicyclomine, handicap placard, loperamide, ondansetron, senna, melatonin, benzonatate, apixaban, hydrocortisone, sertraline, and ferosul, and the following Facility-Administered Medications: heparin flush and sodium chloride flush. ALLERGIES:  is allergic to carboplatin and ceftriaxone. FAMILY HISTORY: Negative for any hematological or oncological conditions. SOCIAL HISTORY:  reports that she has been smoking cigarettes. She has a 20.00 pack-year smoking history. She has never used smokeless tobacco. She reports that she does not currently use alcohol.  She reports that she does not use drugs.    REVIEW OF SYSTEMS:     General: No weakness or fatigue. No unanticipated weight loss or decreased appetite. No fever or chills. Eyes: No blurred vision, eye pain or double vision. Ears: No hearing problems or drainage. No tinnitus. Throat: No sore throat, problems with swallowing or dysphagia. Respiratory: No cough, sputum or hemoptysis. No shortness of breath. No pleuritic chest pain. Cardiovascular: No chest pain, orthopnea or PND. No lower extremity edema. No palpitation. Gastrointestinal: As above. Genitourinary: No dysuria, hematuria, frequency or urgency. Musculoskeletal: No muscle aches or pains. No limitation of movement. No back pain. No gait disturbance, No joint complaints. Dermatologic: No skin rashes or pruritus. No skin lesions or discolorations. Psychiatric: No depression, anxiety, or stress or signs of schizophrenia. No change in mood or affect. Hematologic: No history of bleeding tendency. No bruises or ecchymosis. No history of clotting problems. Infectious disease: No fever, chills or frequent infections. Endocrine: No polydipsia or polyuria. No temperature intolerance. Neurologic: No headaches or dizziness. No weakness or numbness of the extremities. No changes in balance, coordination,  memory, mentation, behavior. Allergic/Immunologic: No nasal congestion or hives. No repeated infections. PHYSICAL EXAM:  The patient is not in acute distress. Vital signs: Blood pressure 117/70, pulse 73, temperature 98.9 °F (37.2 °C), temperature source Oral, resp. rate 16, weight 153 lb 12.8 oz (69.8 kg).      General appearance - well appearing, not in pain or distress  Mental status - good mood, alert and oriented  Eyes - pupils equal and reactive, extraocular eye movements intact  Ears - bilateral TM's and external ear canals normal  Nose - normal and patent, no erythema, discharge or polyps  Mouth - mucous membranes moist, pharynx normal without lesions  Neck - supple, no significant adenopathy  Lymphatics - no palpable lymphadenopathy, no hepatosplenomegaly  Chest - clear to auscultation, no wheezes, rales or rhonchi, symmetric air entry  Heart - normal rate, regular rhythm, normal S1, S2, no murmurs, rubs, clicks or gallops  Abdomen - soft, nontender, nondistended, no masses or organomegaly  Neurological - alert, oriented, normal speech, no focal findings or movement disorder noted  Musculoskeletal - no joint tenderness, deformity or swelling  Extremities - peripheral pulses normal, 2+ pedal edema, no clubbing or cyanosis  Skin - normal coloration and turgor, no rashes, no suspicious skin lesions noted     Review of Diagnostic data:   Lab Results   Component Value Date    WBC 8.2 10/27/2022    HGB 12.2 10/27/2022    HCT 40.7 10/27/2022    MCV 98.3 10/27/2022     10/27/2022       Chemistry        Component Value Date/Time     10/27/2022 1043    K 3.6 (L) 10/27/2022 1043     10/27/2022 1043    CO2 28 10/27/2022 1043    BUN 13 10/27/2022 1043    CREATININE 0.77 10/27/2022 1043        Component Value Date/Time    CALCIUM 8.8 10/27/2022 1043    ALKPHOS 88 10/27/2022 1043    AST 12 10/27/2022 1043    ALT 6 10/27/2022 1043    BILITOT 0.1 (L) 10/27/2022 1043          Lab Results   Component Value Date     356 (H) 10/27/2022         IMPRESSION:   Recurrent ovarian cancer. Original diagnosis 2005 with multiple recurrences. Diffuse metastasis. Recent active intra-abdominal bleeding due to splenic mass with GI infiltration. Status post embolization  S/p seizure disorder. lymphedema    PLAN:   S/p multiple hospitalization for different problems as above. Currently patient is relatively stable but in rehab with LE edema. Likely lymphedema. Much better. Continues follow up in lymphedema clinic. Chemo tolerated well. Continue treatment. I reviewed labs as stated above and discussed them with the patient. Labs are adequate for chemotherapy treatment. Patient requested to hold chemo today. Mehrdad Brown for next week. We will proceed with chemotherapy next week as planned with no adjustment. Patient was reminded about potential side effects to treatment. We will continue to monitor labs. Monitor response and toxicity. Will repeat scan next month. Patient's questions were answered to the best of her satisfaction and she verbalized full understanding and agreement.

## 2022-10-29 ENCOUNTER — APPOINTMENT (OUTPATIENT)
Dept: MRI IMAGING | Age: 57
DRG: 101 | End: 2022-10-29
Payer: COMMERCIAL

## 2022-10-29 ENCOUNTER — APPOINTMENT (OUTPATIENT)
Dept: CT IMAGING | Age: 57
DRG: 101 | End: 2022-10-29
Payer: COMMERCIAL

## 2022-10-29 ENCOUNTER — APPOINTMENT (OUTPATIENT)
Dept: GENERAL RADIOLOGY | Age: 57
DRG: 101 | End: 2022-10-29
Payer: COMMERCIAL

## 2022-10-29 ENCOUNTER — HOSPITAL ENCOUNTER (INPATIENT)
Age: 57
LOS: 3 days | Discharge: NURSING, REHAB OR LONG-TERM CARE | DRG: 101 | End: 2022-11-01
Attending: EMERGENCY MEDICINE | Admitting: INTERNAL MEDICINE
Payer: COMMERCIAL

## 2022-10-29 DIAGNOSIS — I63.9 CEREBROVASCULAR ACCIDENT (CVA), UNSPECIFIED MECHANISM (HCC): Primary | ICD-10-CM

## 2022-10-29 DIAGNOSIS — G40.909 SEIZURE DISORDER (HCC): ICD-10-CM

## 2022-10-29 PROBLEM — K59.03 DRUG-INDUCED CONSTIPATION: Status: ACTIVE | Noted: 2022-06-21

## 2022-10-29 PROBLEM — U07.1 CEREBROVASCULAR ACCIDENT (CVA) ASSOCIATED WITH SEVERE ACUTE RESPIRATORY SYNDROME CORONAVIRUS 2 (SARS-COV-2) INFECTION (HCC): Status: ACTIVE | Noted: 2022-10-29

## 2022-10-29 PROBLEM — I82.409 DVT (DEEP VENOUS THROMBOSIS) (HCC): Status: RESOLVED | Noted: 2022-02-02 | Resolved: 2022-10-29

## 2022-10-29 PROBLEM — I82.409 DVT (DEEP VENOUS THROMBOSIS) (HCC): Status: ACTIVE | Noted: 2022-02-02

## 2022-10-29 PROBLEM — R56.9 SEIZURE (HCC): Status: RESOLVED | Noted: 2020-10-21 | Resolved: 2022-10-29

## 2022-10-29 LAB
ABSOLUTE EOS #: 0 K/UL (ref 0–0.4)
ABSOLUTE LYMPH #: 1.3 K/UL (ref 1–4.8)
ABSOLUTE MONO #: 0.7 K/UL (ref 0.1–1.3)
ALBUMIN SERPL-MCNC: 3.9 G/DL (ref 3.5–5.2)
ALP BLD-CCNC: 84 U/L (ref 35–104)
ALT SERPL-CCNC: 6 U/L (ref 5–33)
ANION GAP SERPL CALCULATED.3IONS-SCNC: 10 MMOL/L (ref 9–17)
AST SERPL-CCNC: 15 U/L
BASOPHILS # BLD: 2 % (ref 0–2)
BASOPHILS ABSOLUTE: 0.2 K/UL (ref 0–0.2)
BILIRUB SERPL-MCNC: 0.3 MG/DL (ref 0.3–1.2)
BILIRUBIN URINE: NEGATIVE
BUN BLDV-MCNC: 9 MG/DL (ref 6–20)
CALCIUM SERPL-MCNC: 9 MG/DL (ref 8.6–10.4)
CHLORIDE BLD-SCNC: 100 MMOL/L (ref 98–107)
CO2: 27 MMOL/L (ref 20–31)
COLOR: YELLOW
COMMENT UA: ABNORMAL
CREAT SERPL-MCNC: 0.64 MG/DL (ref 0.5–0.9)
EOSINOPHILS RELATIVE PERCENT: 0 % (ref 0–4)
GFR SERPL CREATININE-BSD FRML MDRD: >60 ML/MIN/1.73M2
GLUCOSE BLD-MCNC: 105 MG/DL
GLUCOSE BLD-MCNC: 87 MG/DL (ref 65–105)
GLUCOSE BLD-MCNC: 97 MG/DL (ref 70–99)
GLUCOSE URINE: NEGATIVE
HCT VFR BLD CALC: 37.2 % (ref 36–46)
HEMOGLOBIN: 12.2 G/DL (ref 12–16)
INR BLD: 1.2
KETONES, URINE: ABNORMAL
LACTIC ACID: 0.8 MMOL/L (ref 0.5–2.2)
LEUKOCYTE ESTERASE, URINE: NEGATIVE
LYMPHOCYTES # BLD: 13 % (ref 24–44)
MAGNESIUM: 1.4 MG/DL (ref 1.6–2.6)
MCH RBC QN AUTO: 30 PG (ref 26–34)
MCHC RBC AUTO-ENTMCNC: 32.7 G/DL (ref 31–37)
MCV RBC AUTO: 91.8 FL (ref 80–100)
MONOCYTES # BLD: 6 % (ref 1–7)
NITRITE, URINE: NEGATIVE
PDW BLD-RTO: 17.9 % (ref 11.5–14.9)
PH UA: 8 (ref 5–8)
PLATELET # BLD: 364 K/UL (ref 150–450)
PMV BLD AUTO: 8.3 FL (ref 6–12)
POTASSIUM SERPL-SCNC: 3.1 MMOL/L (ref 3.7–5.3)
PROTEIN UA: NEGATIVE
PROTHROMBIN TIME: 15.3 SEC (ref 11.8–14.6)
RBC # BLD: 4.05 M/UL (ref 4–5.2)
REASON FOR REJECTION: NORMAL
SEG NEUTROPHILS: 79 % (ref 36–66)
SEGMENTED NEUTROPHILS ABSOLUTE COUNT: 8.1 K/UL (ref 1.3–9.1)
SODIUM BLD-SCNC: 137 MMOL/L (ref 135–144)
SPECIFIC GRAVITY UA: 1.02 (ref 1–1.03)
TOTAL PROTEIN: 6.8 G/DL (ref 6.4–8.3)
TROPONIN, HIGH SENSITIVITY: 19 NG/L (ref 0–14)
TURBIDITY: CLEAR
URINE HGB: NEGATIVE
UROBILINOGEN, URINE: NORMAL
WBC # BLD: 10.2 K/UL (ref 3.5–11)
ZZ NTE CLEAN UP: ORDERED TEST: NORMAL
ZZ NTE WITH NAME CLEAN UP: SPECIMEN SOURCE: NORMAL

## 2022-10-29 PROCEDURE — 70553 MRI BRAIN STEM W/O & W/DYE: CPT

## 2022-10-29 PROCEDURE — 6360000004 HC RX CONTRAST MEDICATION: Performed by: INTERNAL MEDICINE

## 2022-10-29 PROCEDURE — 80053 COMPREHEN METABOLIC PANEL: CPT

## 2022-10-29 PROCEDURE — 2580000003 HC RX 258: Performed by: INTERNAL MEDICINE

## 2022-10-29 PROCEDURE — 84484 ASSAY OF TROPONIN QUANT: CPT

## 2022-10-29 PROCEDURE — 82947 ASSAY GLUCOSE BLOOD QUANT: CPT

## 2022-10-29 PROCEDURE — 6370000000 HC RX 637 (ALT 250 FOR IP)

## 2022-10-29 PROCEDURE — 82565 ASSAY OF CREATININE: CPT

## 2022-10-29 PROCEDURE — 2580000003 HC RX 258: Performed by: EMERGENCY MEDICINE

## 2022-10-29 PROCEDURE — 85610 PROTHROMBIN TIME: CPT

## 2022-10-29 PROCEDURE — 85025 COMPLETE CBC W/AUTO DIFF WBC: CPT

## 2022-10-29 PROCEDURE — A9579 GAD-BASE MR CONTRAST NOS,1ML: HCPCS | Performed by: INTERNAL MEDICINE

## 2022-10-29 PROCEDURE — 99254 IP/OBS CNSLTJ NEW/EST MOD 60: CPT | Performed by: PSYCHIATRY & NEUROLOGY

## 2022-10-29 PROCEDURE — 2500000003 HC RX 250 WO HCPCS

## 2022-10-29 PROCEDURE — 70450 CT HEAD/BRAIN W/O DYE: CPT

## 2022-10-29 PROCEDURE — 83605 ASSAY OF LACTIC ACID: CPT

## 2022-10-29 PROCEDURE — 83735 ASSAY OF MAGNESIUM: CPT

## 2022-10-29 PROCEDURE — 36415 COLL VENOUS BLD VENIPUNCTURE: CPT

## 2022-10-29 PROCEDURE — 6360000002 HC RX W HCPCS: Performed by: INTERNAL MEDICINE

## 2022-10-29 PROCEDURE — 6360000004 HC RX CONTRAST MEDICATION: Performed by: EMERGENCY MEDICINE

## 2022-10-29 PROCEDURE — 2060000000 HC ICU INTERMEDIATE R&B

## 2022-10-29 PROCEDURE — 71045 X-RAY EXAM CHEST 1 VIEW: CPT

## 2022-10-29 PROCEDURE — 81003 URINALYSIS AUTO W/O SCOPE: CPT

## 2022-10-29 PROCEDURE — 99285 EMERGENCY DEPT VISIT HI MDM: CPT

## 2022-10-29 PROCEDURE — 70496 CT ANGIOGRAPHY HEAD: CPT

## 2022-10-29 RX ORDER — SODIUM CHLORIDE 0.9 % (FLUSH) 0.9 %
5-40 SYRINGE (ML) INJECTION PRN
Status: DISCONTINUED | OUTPATIENT
Start: 2022-10-29 | End: 2022-11-01 | Stop reason: HOSPADM

## 2022-10-29 RX ORDER — INSULIN LISPRO 100 [IU]/ML
0-4 INJECTION, SOLUTION INTRAVENOUS; SUBCUTANEOUS
Status: DISCONTINUED | OUTPATIENT
Start: 2022-10-30 | End: 2022-11-01 | Stop reason: HOSPADM

## 2022-10-29 RX ORDER — LORAZEPAM 0.5 MG/1
0.5 TABLET ORAL ONCE
Status: DISCONTINUED | OUTPATIENT
Start: 2022-10-29 | End: 2022-10-30

## 2022-10-29 RX ORDER — INSULIN LISPRO 100 [IU]/ML
0-4 INJECTION, SOLUTION INTRAVENOUS; SUBCUTANEOUS NIGHTLY
Status: DISCONTINUED | OUTPATIENT
Start: 2022-10-29 | End: 2022-11-01 | Stop reason: HOSPADM

## 2022-10-29 RX ORDER — LACOSAMIDE 150 MG/1
150 TABLET ORAL 2 TIMES DAILY
Status: DISCONTINUED | OUTPATIENT
Start: 2022-10-29 | End: 2022-11-01 | Stop reason: HOSPADM

## 2022-10-29 RX ORDER — ONDANSETRON 4 MG/1
4 TABLET, ORALLY DISINTEGRATING ORAL EVERY 8 HOURS PRN
Status: DISCONTINUED | OUTPATIENT
Start: 2022-10-29 | End: 2022-11-01 | Stop reason: HOSPADM

## 2022-10-29 RX ORDER — SODIUM CHLORIDE 9 MG/ML
INJECTION, SOLUTION INTRAVENOUS PRN
Status: DISCONTINUED | OUTPATIENT
Start: 2022-10-29 | End: 2022-11-01

## 2022-10-29 RX ORDER — SODIUM CHLORIDE 0.9 % (FLUSH) 0.9 %
5-40 SYRINGE (ML) INJECTION EVERY 12 HOURS SCHEDULED
Status: DISCONTINUED | OUTPATIENT
Start: 2022-10-29 | End: 2022-11-01

## 2022-10-29 RX ORDER — SENNA AND DOCUSATE SODIUM 50; 8.6 MG/1; MG/1
2 TABLET, FILM COATED ORAL NIGHTLY
Status: DISCONTINUED | OUTPATIENT
Start: 2022-10-29 | End: 2022-11-01 | Stop reason: HOSPADM

## 2022-10-29 RX ORDER — ONDANSETRON 2 MG/ML
4 INJECTION INTRAMUSCULAR; INTRAVENOUS EVERY 6 HOURS PRN
Status: DISCONTINUED | OUTPATIENT
Start: 2022-10-29 | End: 2022-11-01 | Stop reason: HOSPADM

## 2022-10-29 RX ORDER — OLANZAPINE 10 MG/1
10 TABLET ORAL NIGHTLY
Status: DISCONTINUED | OUTPATIENT
Start: 2022-10-29 | End: 2022-10-30

## 2022-10-29 RX ORDER — BENZONATATE 100 MG/1
100 CAPSULE ORAL 3 TIMES DAILY PRN
Status: DISCONTINUED | OUTPATIENT
Start: 2022-10-29 | End: 2022-11-01 | Stop reason: HOSPADM

## 2022-10-29 RX ORDER — PANTOPRAZOLE SODIUM 40 MG/1
40 TABLET, DELAYED RELEASE ORAL DAILY
Status: DISCONTINUED | OUTPATIENT
Start: 2022-10-30 | End: 2022-11-01 | Stop reason: HOSPADM

## 2022-10-29 RX ORDER — SENNA PLUS 8.6 MG/1
1 TABLET ORAL 2 TIMES DAILY
Status: DISCONTINUED | OUTPATIENT
Start: 2022-10-29 | End: 2022-11-01 | Stop reason: HOSPADM

## 2022-10-29 RX ORDER — LEVETIRACETAM 750 MG/1
750 TABLET ORAL 2 TIMES DAILY
Status: DISCONTINUED | OUTPATIENT
Start: 2022-10-29 | End: 2022-11-01 | Stop reason: HOSPADM

## 2022-10-29 RX ORDER — DEXTROSE MONOHYDRATE 100 MG/ML
INJECTION, SOLUTION INTRAVENOUS CONTINUOUS PRN
Status: DISCONTINUED | OUTPATIENT
Start: 2022-10-29 | End: 2022-11-01 | Stop reason: HOSPADM

## 2022-10-29 RX ORDER — LOPERAMIDE HYDROCHLORIDE 2 MG/1
2 CAPSULE ORAL 4 TIMES DAILY PRN
Status: DISCONTINUED | OUTPATIENT
Start: 2022-10-29 | End: 2022-11-01 | Stop reason: HOSPADM

## 2022-10-29 RX ORDER — LANOLIN ALCOHOL/MO/W.PET/CERES
6 CREAM (GRAM) TOPICAL NIGHTLY
Status: DISCONTINUED | OUTPATIENT
Start: 2022-10-29 | End: 2022-11-01 | Stop reason: HOSPADM

## 2022-10-29 RX ORDER — LORAZEPAM 2 MG/ML
1 INJECTION INTRAMUSCULAR ONCE
Status: COMPLETED | OUTPATIENT
Start: 2022-10-29 | End: 2022-10-29

## 2022-10-29 RX ORDER — 0.9 % SODIUM CHLORIDE 0.9 %
80 INTRAVENOUS SOLUTION INTRAVENOUS ONCE
Status: COMPLETED | OUTPATIENT
Start: 2022-10-29 | End: 2022-10-29

## 2022-10-29 RX ORDER — POLYETHYLENE GLYCOL 3350 17 G/17G
17 POWDER, FOR SOLUTION ORAL DAILY PRN
Status: DISCONTINUED | OUTPATIENT
Start: 2022-10-29 | End: 2022-11-01 | Stop reason: HOSPADM

## 2022-10-29 RX ORDER — ACETAMINOPHEN 650 MG/1
650 SUPPOSITORY RECTAL EVERY 6 HOURS PRN
Status: DISCONTINUED | OUTPATIENT
Start: 2022-10-29 | End: 2022-11-01 | Stop reason: HOSPADM

## 2022-10-29 RX ORDER — SODIUM CHLORIDE 0.9 % (FLUSH) 0.9 %
10 SYRINGE (ML) INJECTION 2 TIMES DAILY
Status: DISCONTINUED | OUTPATIENT
Start: 2022-10-29 | End: 2022-11-01

## 2022-10-29 RX ORDER — AMLODIPINE BESYLATE 10 MG/1
10 TABLET ORAL DAILY
Status: DISCONTINUED | OUTPATIENT
Start: 2022-10-30 | End: 2022-11-01 | Stop reason: HOSPADM

## 2022-10-29 RX ORDER — SODIUM CHLORIDE 0.9 % (FLUSH) 0.9 %
10 SYRINGE (ML) INJECTION PRN
Status: DISCONTINUED | OUTPATIENT
Start: 2022-10-29 | End: 2022-11-01 | Stop reason: HOSPADM

## 2022-10-29 RX ORDER — ACETAMINOPHEN 325 MG/1
650 TABLET ORAL EVERY 6 HOURS PRN
Status: DISCONTINUED | OUTPATIENT
Start: 2022-10-29 | End: 2022-11-01 | Stop reason: HOSPADM

## 2022-10-29 RX ORDER — LACTOBACILLUS RHAMNOSUS GG 10B CELL
1 CAPSULE ORAL 2 TIMES DAILY
Status: DISCONTINUED | OUTPATIENT
Start: 2022-10-29 | End: 2022-11-01 | Stop reason: HOSPADM

## 2022-10-29 RX ADMIN — LAMOTRIGINE 125 MG: 100 TABLET ORAL at 22:40

## 2022-10-29 RX ADMIN — BENZONATATE 100 MG: 100 CAPSULE ORAL at 22:39

## 2022-10-29 RX ADMIN — APIXABAN 5 MG: 5 TABLET, FILM COATED ORAL at 22:39

## 2022-10-29 RX ADMIN — IOPAMIDOL 75 ML: 755 INJECTION, SOLUTION INTRAVENOUS at 15:27

## 2022-10-29 RX ADMIN — Medication 1 CAPSULE: at 22:39

## 2022-10-29 RX ADMIN — SODIUM CHLORIDE 80 ML: 9 INJECTION, SOLUTION INTRAVENOUS at 15:28

## 2022-10-29 RX ADMIN — SODIUM CHLORIDE, PRESERVATIVE FREE 10 ML: 5 INJECTION INTRAVENOUS at 21:10

## 2022-10-29 RX ADMIN — LEVETIRACETAM 750 MG: 750 TABLET, FILM COATED ORAL at 22:39

## 2022-10-29 RX ADMIN — OLANZAPINE 10 MG: 10 TABLET, FILM COATED ORAL at 22:39

## 2022-10-29 RX ADMIN — LORAZEPAM 1 MG: 2 INJECTION INTRAMUSCULAR; INTRAVENOUS at 19:48

## 2022-10-29 RX ADMIN — ANTI-FUNGAL POWDER MICONAZOLE NITRATE TALC FREE: 1.42 POWDER TOPICAL at 22:41

## 2022-10-29 RX ADMIN — GADOTERIDOL 15 ML: 279.3 INJECTION, SOLUTION INTRAVENOUS at 21:08

## 2022-10-29 RX ADMIN — Medication 6 MG: at 22:39

## 2022-10-29 RX ADMIN — SODIUM CHLORIDE, PRESERVATIVE FREE 10 ML: 5 INJECTION INTRAVENOUS at 15:27

## 2022-10-29 RX ADMIN — SENNOSIDES 8.6 MG: 8.6 TABLET, FILM COATED ORAL at 22:39

## 2022-10-29 RX ADMIN — SENNOSIDES AND DOCUSATE SODIUM 2 TABLET: 50; 8.6 TABLET ORAL at 22:40

## 2022-10-29 RX ADMIN — LACOSAMIDE 150 MG: 150 TABLET, FILM COATED ORAL at 22:40

## 2022-10-29 ASSESSMENT — ENCOUNTER SYMPTOMS
SORE THROAT: 0
EYE REDNESS: 0
COLOR CHANGE: 0
SINUS PRESSURE: 0
CONSTIPATION: 0
EYE DISCHARGE: 0
VOMITING: 0
NAUSEA: 0
PHOTOPHOBIA: 0
ABDOMINAL PAIN: 0
DIARRHEA: 0
BACK PAIN: 0
COUGH: 0
FACIAL SWELLING: 0
BLOOD IN STOOL: 0
WHEEZING: 0
EYE PAIN: 0
SHORTNESS OF BREATH: 0
TROUBLE SWALLOWING: 0
RHINORRHEA: 0
CHEST TIGHTNESS: 0

## 2022-10-29 NOTE — VIRTUAL HEALTH
Consults  Patient Location:  [address] [place] [address] ED    Provider Location ([facility]/State):   Taisha Pinon, [place], [place]    This virtual visit was conducted via interactive/real-time audio/video. [facility] Stroke and Vascular Neurology Consult for  Stefan Stroke Alert through [address] @ 15:04  10/29/2022 4:13 PM  Pt Name: Lelo Mccarthy  MRN: [id number]  YOB: 1965  Date of evaluation: 10/29/2022  Primary Care Physician: Barrett Moreno DO  Reason for Evaluation: Stroke Evaluation with Discussion with Ed or primary team with Telemedicine and stroke evaluation with Review of imaging and labs    Lelo Mccarthy is a 59 y.o. female with ovarian cancer and metastasis, seizures, PRES x 2, who presents with AMS and dysarthric speech upon waking up. Patient lives in a nursing home and was noted to be altered by daughter today and hence brought to the ED. LKW: yesterday night  NIH:  5    Allergies  is allergic to carboplatin and ceftriaxone. Medications  Prior to Admission medications    Medication Sig Start Date End Date Taking? Authorizing Provider   lacosamide (VIMPAT) 150 MG TABS tablet Take 1 tablet by mouth 2 times daily. 10/14/22 10/15/25  ESTRELLITA Martinez CNP   levETIRAcetam (KEPPRA) 750 MG tablet Take 1 tablet by mouth 2 times daily  Patient taking differently: Take 750 mg by mouth 2 times daily Might be taking a different dose 10/14/22   ESTRELLITA Martinez CNP   OLANZapine (ZYPREXA) 5 MG tablet Take 1 tablet by mouth nightly  Patient taking differently: Take 10 mg by mouth nightly 10/13/22   Jose Fajardo MD   amLODIPine (NORVASC) 10 MG tablet Take 1 tablet by mouth daily 10/11/22   Cherylene Rubinstein, MD   metoprolol tartrate (LOPRESSOR) 25 MG tablet Take 1 tablet by mouth 2 times daily 10/10/22   Cherylene Rubinstein, MD   miconazole (ZEASORB-AF) 2 % powder Apply topically 2 times daily.  10/10/22   Chanda Araujo MD   STIMULANT LAXATIVE 8.6-50 MG per Bleeding, Cervical cancer ([facility] [address] Depression, Diabetes mellitus ([facility] [address] GERD (gastroesophageal reflux disease), Hx of blood clots, Hypertension, Metastatic cancer ([facility] [address] [facility] Post chemo evaluation, PRES (posterior reversible encephalopathy syndrome), and Splenic lesion. Social History  Social History     Socioeconomic History    Marital status: Single     Spouse name: Not on file    Number of children: Not on file    Years of education: Not on file    Highest education level: Not on file   Occupational History    Not on file   Tobacco Use    Smoking status: Every Day     Packs/day: 1.00     Years: 20.00     Pack years: 20.00     Types: Cigarettes    Smokeless tobacco: Never   Vaping Use    Vaping Use: Never used   Substance and Sexual Activity    Alcohol use: Not Currently    Drug use: Never    Sexual activity: Not on file   Other Topics Concern    Not on file   Social History Narrative    Not on file     Social Determinants of Health     Financial Resource Strain: Medium Risk    Difficulty of Paying Living Expenses: Somewhat hard   Food Insecurity: Food Insecurity Present    Worried About Running Out of Food in the Last Year: Often true    Ran Out of Food in the Last Year: Sometimes true   Transportation Needs: Not on file   Physical Activity: Not on file   Stress: Not on file   Social Connections: Not on file   Intimate Partner Violence: Not on file   Housing Stability: Not on file     Family History      Problem Relation Age of Onset    Alcohol Abuse Mother     Cirrhosis Mother        OBJECTIVE  BP (!) 148/67   Pulse 77   Temp 99.8 °F (37.7 °C)   Resp 17   SpO2 96%     NIH Stroke Scale  Interval: Baseline  Level of Consciousness (1a): Alert  LOC Questions (1b):  Answers neither question correctly  LOC Commands (1c): Performs both tasks correctly  Best Gaze (2): Normal  Visual (3): No visual loss  Facial Palsy (4): Normal symmetrical movement  Motor Arm, Left (5a): No drift  Motor Arm, Right (5b): No drift  Motor Leg, Left (6a): No drift  Motor Leg, Right (6b): No drift  Limb Ataxia (7): Absent  Sensory (8): Normal  Best Language (9): Severe aphasia  Dysarthria (10): Mild to moderate, slurs some words  Extinction and Inattention (11): No abnormality  Total: 5  Pre-Morbid mRS: 0    Imaging:  Images were personally reviewed with VIBHA. AI used to review images including:  CT brain without contrast: nothing acute  CTA imaging: no LVO, left pcomm infundibulum vs aneurysm    Assessment    59year old woman with hx of PRES x2, ovarian cancer and mets, seizures, now p/w altered mentation    Recommendations:  NIH 5  Recommend Inpatient Neurology Consult for further assessment and evaluation    consider . 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 Aponte Person    At least 37 min of Telemedicine and time in conversation directly with ED staff and physician for the patient who is in imminent and life threatening deterioration without further treatment and evaluation. This Virtual Visit was conducted with patient's (and/or legal guardian's) consent, to provide telestroke consultation and necessary medical care.   Time spent examining patient, reviewing the images personally, reviewing the chart, perform high complexity decision making and speaking with the nursing staff regarding recommendations        Jessi Soto MD  Stroke, Neurocritical Care And/or [address] [place] [address] Stroke [address]  Electronically signed 10/29/2022 at 4:13 PM

## 2022-10-29 NOTE — ED NOTES
Per [address]  - MRI team is to be called in today to complete scans      Kenneth Zarco RN  10/29/22 8899

## 2022-10-29 NOTE — ED NOTES
Patient refused for me to get her ekg daughter in room, Rock Roche RN notified.      Mir Tesfaye  10/29/22 0631

## 2022-10-29 NOTE — Clinical Note
Discharge Plan[de-identified] Other/[facility])   Telemetry/Cardiac Monitoring Required?: Yes

## 2022-10-29 NOTE — ED NOTES
Verbal order for MRI brain with contrast obtained from [address]  per Neurologist recommendation      Millie Alegria RN  10/29/22 5944

## 2022-10-29 NOTE — ED NOTES
Report given to RN from PCU. Report method by phone   The following was reviewed with receiving RN:   Current vital signs:  BP (!) 144/65   Pulse 66   Temp 99.8 °F (37.7 °C)   Resp 16   SpO2 92%                      Any medication or safety alerts were reviewed. Any pending diagnostics and notifications were also reviewed, as well as any safety concerns or issues, abnormal labs, abnormal imaging, and abnormal assessment findings. Questions were answered.           Bhavana Gurrola RN  10/29/22 5694

## 2022-10-29 NOTE — ED NOTES
This phleb was notified of blood specimens needing to be collected, went to collect specimens but telestroke Neurologist came on to do assessment, will return when assessment is complete.       Norm Clamp  10/29/22 3596

## 2022-10-29 NOTE — ED TRIAGE NOTES
Patient to emergency department with complaints of altered mental status and trouble communicating. This morning when the family arrived at the facility she was acting this way, reports fever, pt placed on 2L [place] which is new but may be due to her having pneumonia recently.  Pt not speaking coherent sentences detailed exam warm and dry/sweaty

## 2022-10-29 NOTE — PROGRESS NOTES
Writer responded to Stroke Alert. No Pt in room, but prayed with two daughters at the waiting room. Welcomed prayer. 10/29/22 1534   Encounter Summary   Encounter Overview/Reason  Crisis   Service Provided For: Family   Referral/Consult From: Multi-disciplinary team   Support System Children   Last Encounter  10/29/22   Complexity of Encounter Moderate   Crisis   Type Code Stroke   Assessment/Intervention/Outcome   Assessment Anxious; Shock; Fearful;Despair;Coping   Intervention Active listening;Prayer (assurance of)/[place];Sustaining Presence/Ministry of presence   Outcome Coping;Engaged in conversation;Receptive

## 2022-10-29 NOTE — H&P
[address]  Mary Rutherford MD; Laurent Terrazas MD; Pj Tejeda MD; MD Julia Alvarez MD; MD CRISTINA Estrada [facility] Internal Medicine   [facility]    HISTORY AND PHYSICAL EXAMINATION            Date:   10/29/2022  Patient name:  Blanca Ennis  Date of admission:  10/29/2022  2:36 PM  MRN:   [id number]  Account:  [de-identified]  YOB: 1965  PCP:    Carmine Phillip DO  Room:   03/03  Code Status:    Prior    Chief Complaint:     Chief Complaint   Patient presents with    Altered Mental Status       History Obtained From:     Patient and electronic medical record    History of Present Illness:     Blanca Ennis is a 59 y.o. Non- / non  female who presents with Altered Mental Status   and is admitted to the hospital for the management of Acute CVA (cerebrovascular accident) ([facility] [address] Patient comes in with difficulty talking. Family is at bedside who brought her in as she has been unable to communicate. Daughter stated that her speech is completely garbled and is difficult to understand. Patient is able to understand everybody but is having a hard time communicating what she wants to say. Patient is not oriented to place or person or time. Denies numbness or tingling. Denies dizziness or lightheadedness. Denies pain, fever, chills, sob, urinary or bowel complaints. She has not been recently ill but was admitted in the hospital 3 weeks ago for seizures and pneumonia. Past medical history is significant for ovarian cancer with metastasis, anemia, seizures, DM, GERD, DVT and HTN. On arrival to the ER tele stroke evaluation was done and is negative. She is afebrile and hemodynamically stable. Labs reviewed and she has hypokalemia. Patient is scheduled for MRI wo contrast of brain.     Past Medical History:     Past Medical History:   Diagnosis Date    Anemia     Bleeding 10/2020    intra-abdominal bleeding -due to splenic mass with GI infiltration. Status post embolization    Cervical cancer ([facility] [address]     Depression     Diabetes mellitus ([address]     GERD (gastroesophageal reflux disease)     Hx of blood clots     Hypertension     Metastatic cancer ([facility] [address] 10/2020    extensive intraabdominal and splenic involvement and lung mets.  Ovarian cancer ([facility] [address]     low grade serous ovarian carcinoma    Post chemo evaluation     2007: Chemo via med onc (Dr. Kimberley Horton), 2008: Devota Fought due to rising CA-125, 2013: intraperitoneal chemo,12/2015: Ca125 - 25     PRES (posterior reversible encephalopathy syndrome)     Splenic lesion         Past Surgical History:     Past Surgical History:   Procedure Laterality Date    ABSCESS DRAINAGE  2013    Franca rectal    ANUS SURGERY      ANAL FISSURECTOMY    CARDIAC CATHETERIZATION      COLECTOMY  03/2013    ex lap, tumor debulking, transverse colectomy w reanastamosis, subgastric omentectomy, intraperitoneal port placement    HYSTERECTOMY, TOTAL ABDOMINAL (CERVIX REMOVED)      IR EMBOLIZATION HEMORRHAGE  10/05/2020    intra-abdominal bleeding -due to splenic mass with GI infiltration. Status post embolization [employer] interlock coils x7. mri condtional 3t ok, safe immediately post implant.  IR PORT PLACEMENT EQUAL OR GREATER THAN 5 YEARS  08/24/2020    IR PORT PLACEMENT EQUAL OR GREATER THAN 5 YEARS 8/24/2020 Deborah Johnson MD [facility] PROCEDURES    PORT SURGERY      IP Port    TONSILLECTOMY          Medications Prior to Admission:     Prior to Admission medications    Medication Sig Start Date End Date Taking? Authorizing Provider   lacosamide (VIMPAT) 150 MG TABS tablet Take 1 tablet by mouth 2 times daily.  10/14/22 10/15/25  ESTRELLITA Martinez - CNP   levETIRAcetam (KEPPRA) 750 MG tablet Take 1 tablet by mouth 2 times daily  Patient taking differently: Take 750 mg by mouth 2 times daily Might be taking a different dose 10/14/22   Tha Porras, APRN - CNP   OLANZapine (ZYPREXA) 5 MG tablet Take 1 tablet by mouth nightly  Patient taking differently: Take 10 mg by mouth nightly 10/13/22   Ac Montana MD   amLODIPine (NORVASC) 10 MG tablet Take 1 tablet by mouth daily 10/11/22   Roldan Martino MD   metoprolol tartrate (LOPRESSOR) 25 MG tablet Take 1 tablet by mouth 2 times daily 10/10/22   Roldan Martino MD   miconazole (ZEASORB-AF) 2 % powder Apply topically 2 times daily. 10/10/22   Nidia Patel MD   STIMULANT LAXATIVE 8.6-50 MG per tablet TAKE 2 TABLETS BY MOUTH NIGHTLY.  10/6/22   Shanda Hinds,    lamoTRIgine (LAMICTAL) 25 MG tablet Take 5 tablets by mouth 2 times daily 9/22/22   Shanda Hinds, DO   sertraline (ZOLOFT) 100 MG tablet TAKE 1 TABLET BY MOUTH DAILY 9/21/22 10/21/22  Nery Bhatia MD   FEROSUL 325 (65 Fe) MG tablet TAKE 1 TABLET BY MOUTH ONCE DAILY WITH BREAKFAST  Patient not taking: Reported on 10/27/2022 9/9/22   Jose Babcock MD   pantoprazole (PROTONIX) 40 MG tablet Take 1 tablet by mouth daily 9/1/22   Nery Bhatia MD   Lactobacillus (ACIDOPHILUS) CAPS capsule TAKE 1 TABLET BY MOUTH ONCE DAILY IN THE MORNING AND 1 TABLET BEFORE BEDTIME 8/31/22   Shanda Hinds DO   dicyclomine (BENTYL) 20 MG tablet TAKE 1 TABLET (20 MG TOTAL) BY MOUTH IN THE MORNING AND 1 TABLET (20 MG TOTAL) BEFORE BEDTIME. 8/29/22   Shanda Hinds DO   Handicap Placard MISC 5 years 7/19/22   Cornelious Lizabeth Hinds DO   loperamide (IMODIUM) 2 MG capsule Take 2 mg by mouth 4 times daily as needed for Diarrhea    Historical Provider, MD   ondansetron (ZOFRAN-ODT) 4 MG disintegrating tablet Take 1 tablet by mouth every 8 hours as needed for Nausea or Vomiting 5/13/22   Jose Babcock MD   senna (SENOKOT) 8.6 MG tablet Take 1 tablet by mouth 2 times daily 5/2/22 5/2/23  Servando Reyes MD   melatonin 5 MG TABS tablet Take 1 tablet by mouth daily 4/13/22   Shanda Hinds DO   benzonatate (TESSALON PERLES) 100 MG capsule Take 1 capsule by mouth 3 times daily as needed for Cough 4/13/22   Shanda Hinds, DO   apixaban (ELIQUIS) 5 MG TABS tablet Take 1 tablet by mouth 2 times daily 4/8/22   Ashly Saucedo MD   hydrocortisone (ANUSOL-HC) 2.5 % CREA rectal cream Apply on affected area twice daily 2/28/22   Radha Valencia MD        Allergies:     Carboplatin and Ceftriaxone    Social History:     Tobacco:    reports that she has been smoking cigarettes. She has a 20.00 pack-year smoking history. She has never used smokeless tobacco.  Alcohol:      reports that she does not currently use alcohol. Drug Use:  reports no history of drug use. Family History:     Family History   Problem Relation Age of Onset    Alcohol Abuse Mother     Cirrhosis Mother        Review of Systems:     Review of Systems   Constitutional:  Negative for appetite change, chills, fatigue, fever and unexpected weight change. HENT:  Negative for congestion, rhinorrhea, sneezing and sore throat. Eyes:  Negative for photophobia, discharge, redness and visual disturbance. Respiratory:  Negative for cough, shortness of breath and wheezing. Cardiovascular:  Negative for chest pain, palpitations and leg swelling. Gastrointestinal:  Negative for abdominal pain, blood in stool, constipation, diarrhea, nausea and vomiting. Endocrine: Negative for cold intolerance and heat intolerance. Genitourinary:  Negative for dysuria, frequency, hematuria and urgency. Musculoskeletal:  Negative for arthralgias, back pain, joint swelling and neck pain. Skin:  Negative for pallor, rash and wound. Allergic/Immunologic: Negative for environmental allergies and food allergies. Neurological:  Positive for speech difficulty. Negative for dizziness, seizures, weakness, light-headedness, numbness and headaches. Hematological:  Negative for adenopathy. Does not bruise/bleed easily.    Psychiatric/Behavioral:  Negative for agitation, confusion, hallucinations and sleep disturbance. The patient is not nervous/anxious. .     Physical Exam:     Physical Exam   Vitals:    Vitals:    10/29/22 1434 10/29/22 1438 10/29/22 1545 10/29/22 1630   BP: 133/63  (!) 148/67 102/84   Pulse: 88  77 74   Resp: 18  17 18   Temp:  99.8 °F (37.7 °C)     SpO2: 96% 96% 96% 96%                    There is no height or weight on file to calculate BMI. Temp (24hrs), Av.8 °F (37.7 °C), Min:99.8 °F (37.7 °C), Max:99.8 °F (37.7 °C)    No results for input(s): POCGLU in the last 72 hours. General Appearance:   Not oriented to time, place or person. Appears in mild distress ,                 Skin:                             No rash or erythema  HEENT ;                                                                       No icterus, no pallor . No ptosis. No gross asymmetry  Or abnormality face         Neck:                            No mass , no thyroid enlargement                                           Pulmonary/Chest:                                               Symmetric                                          Clear to auscultation bilaterally . No wheezes,                                          No rales or rhonchi . No abnormality on percussion                                                        Cardiovascular:            Normal rate, regular rhythm,                                          No murmur or  Gallop . Abdomen:                       Soft, non-tender                                           Normal bowels sounds,                                             Extremities:                    No  Edema .                                            Musculo-skeletal / Neurological ;;                  Neurological ;                 Speech difficulty                 No focal sensory deficit ,    Musculo-skeletal ;                  No  gait abnormality                  No significant joint abnormality,                                                                                                                    Investigations:      URINE ANALYSIS: No results found for: LABURIN     CBC:  Lab Results   Component Value Date/Time    WBC 10.2 10/29/2022 02:55 PM    HGB 12.2 10/29/2022 02:55 PM     10/29/2022 02:55 PM             BMP:    Lab Results   Component Value Date/Time     10/29/2022 04:48 PM    K 3.1 10/29/2022 04:48 PM     10/29/2022 04:48 PM    CO2 27 10/29/2022 04:48 PM    BUN 9 10/29/2022 04:48 PM    CREATININE 0.64 10/29/2022 04:48 PM    GLUCOSE 97 10/29/2022 04:48 PM    GLUCOSE 71 10/24/2022 06:32 AM      LIVER PROFILE:  Lab Results   Component Value Date/Time    ALT 6 10/29/2022 04:48 PM    AST 15 10/29/2022 04:48 PM    PROT 6.8 10/29/2022 04:48 PM    BILITOT 0.3 10/29/2022 04:48 PM    BILIDIR <0.08 02/11/2021 02:21 PM    LABALBU 3.9 10/29/2022 04:48 PM             @BRIEFLABT(TSH)@      Laboratory Testing:  Recent Results (from the past 24 hour(s))   CBC with Auto Differential    Collection Time: 10/29/22  2:55 PM   Result Value Ref Range    WBC 10.2 3.5 - 11.0 k/uL    RBC 4.05 4.0 - 5.2 m/uL    Hemoglobin 12.2 12.0 - 16.0 g/dL    Hematocrit 37.2 36 - 46 %    MCV 91.8 80 - 100 fL    MCH 30.0 26 - 34 pg    MCHC 32.7 31 - 37 g/dL    RDW 17.9 (H) 11.5 - 14.9 %    Platelets 262 459 - 732 k/uL    MPV 8.3 6.0 - 12.0 fL    Seg Neutrophils 79 (H) 36 - 66 %    Lymphocytes 13 (L) 24 - 44 %    Monocytes 6 1 - 7 %    Eosinophils % 0 0 - 4 %    Basophils 2 0 - 2 %    Segs Absolute 8.10 1.3 - 9.1 k/uL    Absolute Lymph # 1.30 1.0 - 4.8 k/uL    Absolute Mono # 0.70 0.1 - 1.3 k/uL    Absolute Eos # 0.00 0.0 - 0.4 k/uL    Basophils Absolute 0.20 0.0 - 0.2 k/uL   SPECIMEN REJECTION    Collection Time: 10/29/22  2:55 PM   Result Value Ref Range    Specimen Source . BLOOD     Ordered Test CMPX, TROPI, PT     Reason for Rejection Unable to perform testing: Specimen hemolyzed. POCT Glucose    Collection Time: 10/29/22  3:00 PM   Result Value Ref Range    Glucose 105 mg/dL   Lactic Acid    Collection Time: 10/29/22  4:48 PM   Result Value Ref Range    Lactic Acid 0.8 0.5 - 2.2 mmol/L   Comprehensive Metabolic Panel w/ Reflex to MG    Collection Time: 10/29/22  4:48 PM   Result Value Ref Range    Glucose 97 70 - 99 mg/dL    BUN 9 6 - 20 mg/dL    Creatinine 0.64 0.50 - 0.90 mg/dL    Est, Glom Filt Rate >60 >60 mL/min/1.73m2    Calcium 9.0 8.6 - 10.4 mg/dL    Sodium 137 135 - 144 mmol/L    Potassium 3.1 (L) 3.7 - 5.3 mmol/L    Chloride 100 98 - 107 mmol/L    CO2 27 20 - 31 mmol/L    Anion Gap 10 9 - 17 mmol/L    Alkaline Phosphatase 84 35 - 104 U/L    ALT 6 5 - 33 U/L    AST 15 <32 U/L    Total Bilirubin 0.3 0.3 - 1.2 mg/dL    Total Protein 6.8 6.4 - 8.3 g/dL    Albumin 3.9 3.5 - 5.2 g/dL   Protime-INR    Collection Time: 10/29/22  4:48 PM   Result Value Ref Range    Protime 15.3 (H) 11.8 - 14.6 sec    INR 1.2    Troponin    Collection Time: 10/29/22  4:48 PM   Result Value Ref Range    Troponin, High Sensitivity 19 (H) 0 - 14 ng/L   Magnesium    Collection Time: 10/29/22  4:48 PM   Result Value Ref Range    Magnesium 1.4 (L) 1.6 - 2.6 mg/dL       Imaging/Diagnostics:  CT HEAD WO CONTRAST    Result Date: 10/29/2022  No acute intracranial abnormality. 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 Minimal chronic microvascular disease. XR CHEST PORTABLE    Result Date: 10/29/2022  1. Possible tiny pleural effusions and scattered interstitial opacities in the lungs, similar to 10/05/2022. No new airspace consolidation. 2.  Visualized loops of bowel in the upper abdomen appear mildly distended with gas. Consider abdominal radiographs.      CTA HEAD NECK W CONTRAST    Result Date: 10/29/2022  No acute abnormality or flow-limiting stenosis of the major arteries of the head and neck. 1.6 mm prominent infundibulum versus saccular aneurysm at the origin of the left posterior communicating artery Lung nodules measuring up to 6.3 mm, likely related to infection/inflammation, increased since January 5, 2021. Follow-up is recommended to exclude metastasis. Mildly prominent mediastinal lymph nodes, may be related to infection/inflammation, increased since January 5, 2021. Follow-up is recommended to exclude lymph node metastasis. Assessment :      Hospital Problems             Last Modified POA    * (Principal) Acute CVA (cerebrovascular accident) ([address] 10/29/2022 Yes    Cerebrovascular accident (CVA) associated with severe acute respiratory syndrome coronavirus 2 (SARS-CoV-2) infection ([address] 10/29/2022 Yes    Epithelial ovarian cancer, FIGO stage OSIRIS ([address] 10/29/2022 Yes    Anemia 10/29/2022 Yes    Seizure disorder, status epilepticus, nonconvulsive ([address] 10/29/2022 Yes    History of ovarian cancer 10/29/2022 Yes    Cancer, metastatic to bone ([address] 10/29/2022 Yes    History of deep venous thrombosis (DVT) of distal vein of left lower extremity: On Eliquis 10/29/2022 Yes    Diabetes mellitus ([address] 10/29/2022 Yes    Gastroesophageal reflux disease 10/29/2022 Yes    Overview Signed 2/21/2022  1:55 AM by Dusty Aquino MD     Last Assessment & Plan:   Formatting of this note might be different from the original.  Condition: stable    Reviewed use of antacid medication and/or diet modifications of decreasing caffeine, spicy foods, chocolate, and avoiding alcohol, tobacco, NSAIDs, and reducing citrus acids. Follow up in: three months            Plan:     Acute CVA: Follow up MRI. Neurology consulted. We will appreciate recommendations. Ovarian cancer with mets: Follow with oncology but is not currently on chemotherapy. H/o seizures: Resumed home VIMPAT and lamotrigine. H/o DVT: Resumed Eliquis  DM: Last HBA1C 5.2 on 4/13/2022.  Started on low dose, sliding scale insulin, poct glucose checks and hypoglycemia protocol. HTN: Resumed home amlodipine. Will resume home lopressor as needed. Monitor BP  GERD: Resumed home Protonix  DVT Px: On Eliquis  GI px: On protonix. Disposition : 3 days      Consultations:   IP CONSULT TO PHARMACY  PHARMACY TO CHANGE BASE FLUIDS  IP CONSULT TO PRIMARY CARE PROVIDER  IP CONSULT TO NEUROLOGY  IP CONSULT TO CASE MANAGEMENT        Chalino Kenney MD  10/29/2022    [address], [address] [place] [address]. Phone [phone]   Fax: [phone]  Answering Service: [phone]    10/29/2022  5:25 PM    Copy sent to Dr. Joaquin Lamar, DO      I have discussed the care of [address] [place], [place] [address]S [place] , including pertinent history and exam findings,    today with the resident. I have seen and examined the patient and the key elements of all parts of the encounter have been performed by me . I agree with the assessment, plan and orders as documented by the resident. Principal Problem:    Acute CVA (cerebrovascular accident) [facility])  Active Problems:    Cerebrovascular accident (CVA) associated with severe acute respiratory syndrome coronavirus 2 (SARS-CoV-2) infection (HCC)    Epithelial ovarian cancer, FIGO stage OSIRIS (HCC)    Anemia    Seizure disorder, status epilepticus, nonconvulsive (HCC)    History of ovarian cancer    Cancer, metastatic to bone ([address]    History of deep venous thrombosis (DVT) of distal vein of left lower extremity: On Eliquis    Diabetes mellitus ([address]    Gastroesophageal reflux disease  Resolved Problems:    Seizure ([address]        Overall  course ;                                   are improving over time.         Patient has past medical history multiple medical problem which include seizure disorder, history of stroke, diabetes, ovarian cancer, metastatic, chronic back pain, history of NC ES,  Admitted with speech difficulties, which is completely resolved  As per nursing report, family is very concerned that her symptoms started when Seroquel was changed to Zyprexa  Will request psych inputs  DC plan, patient and nursing home resident, likely will need precertification          Electronically signed by Tone Lundy MD       Please note that this chart was generated using voice recognition Dragon dictation software. Although every effort was made to ensure the accuracy of this automated transcription, some errors in transcription may have occurred.

## 2022-10-29 NOTE — ED NOTES
Mode of arrival (squad #, walk in, police, etc) : Walk in        Chief complaint(s): AMS / Incomprehensible speech        Arrival Note (brief scenario, treatment PTA, etc). : pt arrives to the ED accompanied by daughter who claims as of this morning pt is acting abnormal and unable to make sense with her words and sentences. Pt was found attempting to drink shampoo. Pt has a history of seizure disorder and hallucination disorder which she was recently put on medications by a psychiatrist to treat. Family states her last known well at baseline was last night. C= \"Have you ever felt that you should Cut down on your drinking? \"  No  A= \"Have people Annoyed you by criticizing your drinking? \"  No  G= \"Have you ever felt bad or Guilty about your drinking? \"  No  E= \"Have you ever had a drink as an Eye-opener first thing in the morning to steady your nerves or to help a hangover? \"  No      Deferred []      Reason for deferring: N/A    *If yes to two or more: probable alcohol abuse. Juju Aldana RN  10/29/22 [id number] [facility] PEDIATRIC/ADOLESCENT INTAKE ASSESSMENT    11/29/21    Yessica Moreau ,a 14 y.o. male, presents to the ED for a psychiatric assessment accompanied by:     ED Arrival time: 1338  ED physician: [facility] Notification time: IN ER  [facility] AN AFFILIATE OF [facility] Assessment start time:   Psychiatrist call time:   Spoke with Dr. Robby Batres    Patient is referred by: Mother brought him    Reason for visit to ED - Presenting problem:     PT states reason for ED visit, \"I am having suicidal thoughts. I feel like harming myself lately. I feel stressed about everything. I am stressed about school and was stressed about work so I quit. Pt reports he's been feeling like this for 3 yrs. He says the thoughts get worse as the days go by. Pt admits to having homicidal thought towards family members that make him mad. ER Physician Reports: Yessica Moreau is a 14 y.o. male who presents to the emergency department depression and suicidal ideation. Patient tells me his been having these thoughts for quite some time and been gradually worsening. Occasionally has homicidal thoughts. Homicidal ideations are not toward anyone in particular but just has trouble with his anger sometimes. No suicidal plan. Feels like his suicidal ideations have been a lot worse recently. No hallucinations or delusions. Seems a little depressed. No other complaints.     Duration of symptoms: 3 yrs but progressively getting worse    Current Stressors: School      Psychosocial History:    HOME:    Current living arrangement: Lives at home with mom and 2 brothers  Who raised the patient (biological parents, step parent, relatives, foster family. If foster family, how many foster families):  Mother  Siblings (brothers, sisters, step siblings, only child): 2 brothers  How does patient describe relationship with parents/siblings:Brothers-good, Parents-good  How does patient describe childhood: Okay  History of physical/emotional/sexual abuse: If yes explain:Denies  Does patient feel safe at home:Yes  If not explain:    SCHOOL:  What school does patient attend: [employer], public, home: Public  Academically completed what grade: 9th  How is patient doing in school (trouble, truancy, listening problems): 'I can't focus because of my anxiety\"  How are school grades (any changes): C's    PEERS:  Does patient have friends: Yes  How many, what kind of relationships, (good, loner, difficulties) Tends to stay to himself  Are friends the same age, older, younger, girls, boys, both: Girls and younger    RELATIONSHIPS/SEXUAL ACTIVITY:  Is the patient currently in a relationship: No  Any previous relationships: No  Does the patient like boys, girls, or both more: Boys  Is the patient sexually active: N/A  History of STD: N/A    SPIRITUALITY:  Any spirituality issues or concerns:Believes in God  Does the patient have inspirations and dreams for the future: Hasn't thought about it    SUBSTANCE USE HISTORY:  Does the patient use substances/ETOH: Yes   If yes: ETOH  What is the drug of choice: Champagne  Length of use: 2 months  How often: whenever we get it   Route: PO  Reasons for substance use (peer pressure, recreational, cope with stress, drugs in family): [place] with stress  When was the last time the patient used substance:  A week ago      C-SSRS Completed: yes  (Pediatric Version if age 6 or under.   Adult Screening Version if age 15 or older)    SI:  admits to   Plan: no   Past SI attempts: no   If yes, when and how many times:  Currently able to contract for safety outside hospital: no   Describe:   HI: admits to  If yes describe: Family- anyone that makes me mad  Delusions: denies  If yes describe:   Hallucinations: denies   If yes describe:   Risk of Harm to self: Self injurious/self mutilation behaviorsno   If yes explain:   Risk of Harm to others: no   If yes explain:   Was it within the past 6 months: no   Anxiety 1-10:  9  Explain if increased:   Depression 1-10:  8  Explain if increased:   Level of function outside hospital decreased: no   If yes explain:     Psychiatric Hospitalizations: No   Where & When:   Outpatient Psychiatric Treatment: No    Family History:    Family history of mental illness: no   Family members with suicide attempt: no   If yes explain:       Psychiatric Review Of Systems:     Recent Sleep changes: yes   Recent appetite changes: yes   Recent weight changes/Pounds gained (+) or lost (-): no      Medical History:     Medical Diagnosis/Issues: Denies    PCP: Deyanira Mesa MD     Current Medications:   Scheduled Meds: No current facility-administered medications for this encounter. No current outpatient medications on file. Mental Status Evaluation:     Appearance:  age appropriate   Behavior:  Restless & fidgety   Speech:  normal pitch and normal volume   Mood:  anxious and depressed   Affect:  flat   Thought Process:  circumstantial     Collateral Information:     Name: Brenna Elizondo  Relationship: Mother  Phone Number: [phone]  Collateral:     Disposition:     Choose one of the options below for disposition:     1. Decision to Transfer to Adolescent Treatment Facility: Discussed with  and Pt needs to be transferred to Adolescent Facility      2.  Decision to Discharge Home:       Other follow up information provided:      Adalberto Amaya RN

## 2022-10-29 NOTE — ED PROVIDER NOTES
[address]  eMERGENCY dEPARTMENT eNCOUnter      Pt Name: Boogie Bains  MRN: [id number]  [place] 1965  Date of evaluation: 10/29/22      CHIEF COMPLAINT     No chief complaint on file. HISTORY OF PRESENT ILLNESS    Boogie Bains is a 59 y.o. female who presents complaining of difficulty talking. Patient was brought in by family this morning she has been unable to communicate. Daughter states that her speech is is completely garbled and difficult to understand. Patient seems to be understanding everybody but is having a hard time communicating what she wants to say. No numbness tingling or weakness. No apparent pain though it is unclear based on when I asked a question she ended up answering something different. Patient has not recently been ill but was in the hospital about 3 weeks ago for seizures and pneumonia. Patient evidently has had this problem in the past.      REVIEW OF SYSTEMS       Review of Systems   Constitutional:  Negative for activity change, appetite change, chills, diaphoresis and fever. HENT:  Negative for congestion, ear pain, facial swelling, nosebleeds, rhinorrhea, sinus pressure, sore throat and trouble swallowing. Eyes:  Negative for pain, discharge and redness. Respiratory:  Negative for cough, chest tightness, shortness of breath and wheezing. Cardiovascular:  Negative for chest pain, palpitations and leg swelling. Gastrointestinal:  Negative for abdominal pain, blood in stool, constipation, diarrhea, nausea and vomiting. Genitourinary:  Negative for difficulty urinating, dysuria, flank pain, frequency, genital sores and hematuria. Musculoskeletal:  Negative for arthralgias, back pain, gait problem, joint swelling, myalgias and neck pain. Skin:  Negative for color change, pallor, rash and wound. Neurological:  Positive for speech difficulty. Negative for dizziness, tremors, seizures, syncope, weakness, numbness and headaches. Psychiatric/Behavioral:  Negative for confusion, decreased concentration, hallucinations, self-injury, sleep disturbance and suicidal ideas. PAST MEDICAL HISTORY     Past Medical History:   Diagnosis Date    Anemia     Bleeding 10/2020    intra-abdominal bleeding -due to splenic mass with GI infiltration. Status post embolization    Cervical cancer (HCC)     Depression     Diabetes mellitus ([facility] [address]     GERD (gastroesophageal reflux disease)     Hx of blood clots     Hypertension     Metastatic cancer ([facility] [address] 10/2020    extensive intraabdominal and splenic involvement and lung mets. Ovarian cancer ([facility] [address]     low grade serous ovarian carcinoma    Post chemo evaluation     2007: Chemo via med onc (Dr. Ravi Allen), 2008: Phil Crigler due to rising CA-125, 2013: intraperitoneal chemo,12/2015: Ca125 - 25     PRES (posterior reversible encephalopathy syndrome)     Splenic lesion        SURGICAL HISTORY       Past Surgical History:   Procedure Laterality Date    ABSCESS DRAINAGE  2013    Franca rectal    ANUS SURGERY      ANAL FISSURECTOMY    CARDIAC CATHETERIZATION      COLECTOMY  03/2013    ex lap, tumor debulking, transverse colectomy w reanastamosis, subgastric omentectomy, intraperitoneal port placement    HYSTERECTOMY, TOTAL ABDOMINAL (CERVIX REMOVED)      IR EMBOLIZATION HEMORRHAGE  10/05/2020    intra-abdominal bleeding -due to splenic mass with GI infiltration. Status post embolization [employer] interlock coils x7. mri condtional 3t ok, safe immediately post implant.     IR PORT PLACEMENT EQUAL OR GREATER THAN 5 YEARS  08/24/2020    IR PORT PLACEMENT EQUAL OR GREATER THAN 5 YEARS 8/24/2020 Rod Worrell MD [facility]    PORT SURGERY      IP Port    TONSILLECTOMY         CURRENT MEDICATIONS       Previous Medications    AMLODIPINE (NORVASC) 10 MG TABLET    Take 1 tablet by mouth daily    APIXABAN (ELIQUIS) 5 MG TABS TABLET    Take 1 tablet by mouth 2 times daily    BENZONATATE (TESSALON PERLES) 100 MG CAPSULE    Take 1 capsule by mouth 3 times daily as needed for Cough    DICYCLOMINE (BENTYL) 20 MG TABLET    TAKE 1 TABLET (20 MG TOTAL) BY MOUTH IN THE MORNING AND 1 TABLET (20 MG TOTAL) BEFORE BEDTIME. FEROSUL 325 (65 FE) MG TABLET    TAKE 1 TABLET BY MOUTH ONCE DAILY WITH BREAKFAST    HANDICAP PLACARD MISC    5 years    HYDROCORTISONE (ANUSOL-HC) 2.5 % CREA RECTAL CREAM    Apply on affected area twice daily    LACOSAMIDE (VIMPAT) 150 MG TABS TABLET    Take 1 tablet by mouth 2 times daily. LACTOBACILLUS (ACIDOPHILUS) CAPS CAPSULE    TAKE 1 TABLET BY MOUTH ONCE DAILY IN THE MORNING AND 1 TABLET BEFORE BEDTIME    LAMOTRIGINE (LAMICTAL) 25 MG TABLET    Take 5 tablets by mouth 2 times daily    LEVETIRACETAM (KEPPRA) 750 MG TABLET    Take 1 tablet by mouth 2 times daily    LOPERAMIDE (IMODIUM) 2 MG CAPSULE    Take 2 mg by mouth 4 times daily as needed for Diarrhea    MELATONIN 5 MG TABS TABLET    Take 1 tablet by mouth daily    METOPROLOL TARTRATE (LOPRESSOR) 25 MG TABLET    Take 1 tablet by mouth 2 times daily    MICONAZOLE (ZEASORB-AF) 2 % POWDER    Apply topically 2 times daily. OLANZAPINE (ZYPREXA) 5 MG TABLET    Take 1 tablet by mouth nightly    ONDANSETRON (ZOFRAN-ODT) 4 MG DISINTEGRATING TABLET    Take 1 tablet by mouth every 8 hours as needed for Nausea or Vomiting    PANTOPRAZOLE (PROTONIX) 40 MG TABLET    Take 1 tablet by mouth daily    SENNA (SENOKOT) 8.6 MG TABLET    Take 1 tablet by mouth 2 times daily    SERTRALINE (ZOLOFT) 100 MG TABLET    TAKE 1 TABLET BY MOUTH DAILY    STIMULANT LAXATIVE 8.6-50 MG PER TABLET    TAKE 2 TABLETS BY MOUTH NIGHTLY. ALLERGIES     is allergic to carboplatin and ceftriaxone. FAMILY HISTORY     [unfilled]     SOCIAL HISTORY      reports that she has been smoking cigarettes. She has a 20.00 pack-year smoking history. She has never used smokeless tobacco. She reports that she does not currently use alcohol. She reports that she does not use drugs.     PHYSICAL EXAM INITIAL VITALS: BP (!) 148/67   Pulse 77   Temp 99.8 °F (37.7 °C)   Resp 17   SpO2 96%      Physical Exam  Vitals and nursing note reviewed. Constitutional:       General: She is not in acute distress. Appearance: She is well-developed. She is not diaphoretic. HENT:      Head: Normocephalic and atraumatic. Eyes:      General: No scleral icterus. Right eye: No discharge. Left eye: No discharge. Conjunctiva/sclera: Conjunctivae normal.      Pupils: Pupils are equal, round, and reactive to light. Cardiovascular:      Rate and Rhythm: Normal rate and regular rhythm. Heart sounds: Normal heart sounds. No murmur heard. No friction rub. No gallop. Pulmonary:      Effort: Pulmonary effort is normal. No respiratory distress. Breath sounds: Normal breath sounds. No wheezing or rales. Chest:      Chest wall: No tenderness. Abdominal:      General: Bowel sounds are normal. There is no distension. Palpations: Abdomen is soft. There is no mass. Tenderness: There is no abdominal tenderness. There is no guarding or rebound. Musculoskeletal:         General: No tenderness. Normal range of motion. Skin:     General: Skin is warm and dry. Coloration: Skin is not pale. Findings: No erythema or rash. Neurological:      Mental Status: She is alert and oriented to person, place, and time. Cranial Nerves: Dysarthria present. No cranial nerve deficit. Sensory: No sensory deficit. Motor: No abnormal muscle tone. Coordination: Coordination normal.      Deep Tendon Reflexes: Reflexes normal.      Comments: Expressive dysphagia   Psychiatric:         Behavior: Behavior normal.         Thought Content: Thought content normal.         Judgment: Judgment normal.       MEDICAL DECISION MAKING:     Patient is having very difficult time communicating.   Patient has no other neurologic deficits on exam.  Because of the acute nature of the symptoms that started it sounds like this morning last seen normal yesterday we will do a stroke alert but she will not be a TNKase candidate. DIAGNOSTIC RESULTS     EKG: All EKG's are interpreted by the Emergency Department Physician who either signs or Co-signs this chart in the absence of a cardiologist.        RADIOLOGY:All plain film, CT, MRI, and formal ultrasound images (except ED bedside ultrasound)are read by the radiologist and interpretations are directly viewed by the emergency physician. CT HEAD WO CONTRAST    Result Date: 10/29/2022  EXAMINATION: CT OF THE HEAD WITHOUT CONTRAST  10/29/2022 3:02 pm TECHNIQUE: CT of the head was performed without the administration of intravenous contrast. Automated exposure control, iterative reconstruction, and/or weight based adjustment of the mA/kV was utilized to reduce the radiation dose to as low as reasonably achievable. COMPARISON: MRI brain October 9, 2022 HISTORY: ORDERING SYSTEM PROVIDED HISTORY: Stroke Symptoms TECHNOLOGIST PROVIDED HISTORY: Stroke Symptoms Decision Support Exception - unselect if not a suspected or confirmed emergency medical condition->Emergency Medical Condition (MA) Reason for Exam: patient here with ams, aphasia and slurred speech FINDINGS: BRAIN/VENTRICLES: There are small old infarctions in the right occipital lobe and left frontal, stable. There is periventricular white matter low attenuation, likely related to minimal chronic microvascular disease. There is no acute intracranial hemorrhage, mass effect or midline shift. No abnormal extra-axial fluid collection. The gray-white differentiation is maintained without evidence of an acute infarct. There is no evidence of hydrocephalus. ORBITS: The visualized portion of the orbits demonstrate no acute abnormality. SINUSES: The visualized paranasal sinuses and mastoid air cells demonstrate no acute abnormality.  SOFT TISSUES/SKULL:  No acute abnormality of the visualized skull or soft tissues. No acute intracranial abnormality. 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 Minimal chronic microvascular disease. XR CHEST PORTABLE    Result Date: 10/29/2022  EXAMINATION: ONE XRAY VIEW OF THE CHEST 10/29/2022 3:35 pm COMPARISON: 10/05/2022 HISTORY: ORDERING SYSTEM PROVIDED HISTORY: stroke symptoms TECHNOLOGIST PROVIDED HISTORY: stroke symptoms FINDINGS: Right chest port remains in place. Heart size is grossly stable. There is elevation of the right diaphragm, which is unchanged. Scattered interstitial thickening in the lungs is not significantly changed. No evidence of pneumothorax, new airspace consolidation, or sizable pleural effusion. Possible tiny pleural effusions, similar to the previous study. No free air beneath the diaphragm. Visualized loops of bowel in the upper abdomen appear mildly distended with gas. Vascular coil noted in the visualized upper abdomen. 1.  Possible tiny pleural effusions and scattered interstitial opacities in the lungs, similar to 10/05/2022. No new airspace consolidation. 2.  Visualized loops of bowel in the upper abdomen appear mildly distended with gas. Consider abdominal radiographs. CTA HEAD NECK W CONTRAST    Result Date: 10/29/2022  EXAMINATION: CTA OF THE HEAD AND NECK WITH CONTRAST 10/29/2022 3:08 pm: TECHNIQUE: CTA of the head and neck was performed with the administration of intravenous contrast. Multiplanar reformatted images are provided for review. MIP images are provided for review. Stenosis of the internal carotid arteries measured using NASCET criteria. Automated exposure control, iterative reconstruction, and/or weight based adjustment of the mA/kV was utilized to reduce the radiation dose to as low as reasonably achievable.  COMPARISON: Noncontrast CT head from earlier today, CTA head and neck October 3, 2022, CT chest abdomen pelvis January 5, 2021 HISTORY: ORDERING SYSTEM PROVIDED HISTORY: Stroke Symptoms TECHNOLOGIST PROVIDED HISTORY: Stroke Symptoms Decision Support Exception - unselect if not a suspected or confirmed emergency medical condition->Emergency Medical Condition (MA) Reason for Exam: patient here for ams, aphasia and slurred speech FINDINGS: CTA NECK: AORTIC ARCH/ARCH VESSELS: No dissection or arterial injury. No significant stenosis of the brachiocephalic or subclavian arteries. CAROTID ARTERIES: No dissection, arterial injury, or hemodynamically significant stenosis by NASCET criteria. VERTEBRAL ARTERIES: No dissection, arterial injury, or significant stenosis. SOFT TISSUES: There is a 4.6 mm lung nodule in the right upper lobe (series 6, image 14). There is a 6.3 mm lung nodule in the right upper lobe (series 6, image 31). There are multiple smaller lung nodules in the bilateral lungs. There are mildly prominent mediastinal lymph nodes, may be related to infection/inflammation. The larynx and pharynx are unremarkable. No acute abnormality of the salivary and thyroid glands. BONES: No acute osseous abnormality. CTA HEAD: ANTERIOR CIRCULATION: No significant stenosis of the intracranial internal carotid, anterior cerebral, or middle cerebral arteries. There is 1.6 mm prominent infundibulum versus saccular aneurysm at the origin of the left posterior communicating artery (series 5, image 49). POSTERIOR CIRCULATION: No significant stenosis of the vertebral, basilar, or posterior cerebral arteries. No aneurysm. OTHER: Limited evaluation of the intracranial dural venous sinuses secondary to suboptimal phase of contrast. BRAIN: No mass effect or midline shift. No extra-axial fluid collection. The gray-white differentiation is maintained. No acute abnormality or flow-limiting stenosis of the major arteries of the head and neck.  1.6 mm prominent infundibulum versus saccular aneurysm at the origin of the left posterior communicating artery Lung nodules measuring up to 6.3 mm, likely related to infection/inflammation, increased since January 5, 2021. Follow-up is recommended to exclude metastasis. Mildly prominent mediastinal lymph nodes, may be related to infection/inflammation, increased since January 5, 2021. Follow-up is recommended to exclude lymph node metastasis. LABS: All lab results were reviewed bymyself, and all abnormals are listed below. Labs Reviewed   CBC WITH AUTO DIFFERENTIAL - Abnormal; Notable for the following components:       Result Value    RDW 17.9 (*)     Seg Neutrophils 79 (*)     Lymphocytes 13 (*)     All other components within normal limits   POCT GLUCOSE - Normal   SPECIMEN REJECTION   URINALYSIS WITH REFLEX TO CULTURE   LACTIC ACID   COMPREHENSIVE METABOLIC PANEL W/ REFLEX TO MG FOR LOW K   PROTIME-INR   TROPONIN         EMERGENCY DEPARTMENT COURSE:   Vitals:    Vitals:    10/29/22 1434 10/29/22 1438 10/29/22 1545   BP: 133/63  (!) 148/67   Pulse: 88  77   Resp: 18  17   Temp:  99.8 °F (37.7 °C)    SpO2: 96% 96% 96%       The patient was given the following medications while in the emergency department:     Orders Placed This Encounter   Medications    iopamidol (ISOVUE-370) 76 % injection 75 mL    0.9 % sodium chloride bolus    sodium chloride flush 0.9 % injection 10 mL       -------------------------  INITIAL NIH STROKE SCALE    Time Performed:  4101 PM    Administer stroke scale items in the order listed. Record performance in each category after each subscale exam. Do not go back and change scores. Follow directions provided for each exam technique. Scores should reflect what the patient does, not what the clinician thinks the patient can do. The clinician should record answers while administering the exam and work quickly. Except where indicated, the patient should not be coached (i.e., repeated requests to patient to make a special effort). 1a.  Level of consciousness:  0 - alert; keenly responsive  1b.   Level of consciousness questions: 1 - answers one question correctly  1c. Level of consciousness questions:  0 - performs both tasks correctly  2. Best Gaze:  0 - normal  3. Visual:  0 - no visual loss  4. Facial Palsy:  0 - normal symmetric movement  5a. Motor left arm:  0 - no drift, limb holds 90 (or 45) degrees for full 10 seconds  5b. Motor right arm:  0 - no drift, limb holds 90 (or 45) degrees for full 10 seconds  6a. Motor left le - no drift; leg holds 30 degree position for full 5 seconds  6b. Motor right le - no drift; leg holds 30 degree position for full 5 seconds  7. Limb Ataxia:  0 - absent  8. Sensory:  0 - normal; no sensory loss  9. Best Language:  2 - severe aphasia; all communication is through fragmentary expression; great need for inference, questioning, and guessing by the listener. Range of information that can be exchanged is limited; listener carries burden of communication. Examiner cannot identify materials provided from patient response. 10.  Dysarthria:  0 - normal  11. Extinction and Inattention:  0 - no abnormality    TOTAL:  3    4:44 PM EDT  Patient was called as a stroke alert though greater than 3 hours or at least unknown exactly when it started. CT CTA was unremarkable I spoke with Dr. Jah Hayes with neuro interventional and he does not feel there is anything that he needs to do mechanical intervention wise at this time and feels the patient can be admitted here for an MRI. Patient is okay with this plan. I discussed the case with Dr. Abner Macedo who agrees to admit. CRITICAL CARE:   None    CONSULTS:  IP CONSULT TO PHARMACY  PHARMACY TO CHANGE BASE FLUIDS  IP CONSULT TO PRIMARY CARE PROVIDER    PROCEDURES:  None    FINAL IMPRESSION      1. Cerebrovascular accident (CVA), unspecified mechanism ([facility] [address]          DISPOSITION/PLAN   DISPOSITION Decision To Admit 10/29/2022 04:33:11 PM      PATIENT REFERRED TO:  No follow-up provider specified.     DISCHARGE MEDICATIONS:  New Prescriptions    No medications on file       (Please note that portions of this note were completed with a voice recognition program.  Efforts were made to edit the dictations but occasionally words are mis-transcribed.)    Efrem Uriarte MD  Attending Arely Her MD  10/29/22 [id number]

## 2022-10-30 PROBLEM — R47.9 DIFFICULTY WITH SPEECH: Status: ACTIVE | Noted: 2022-10-30

## 2022-10-30 PROBLEM — F33.2 MDD (MAJOR DEPRESSIVE DISORDER), RECURRENT SEVERE, WITHOUT PSYCHOSIS (HCC): Status: ACTIVE | Noted: 2022-10-30

## 2022-10-30 LAB
ABSOLUTE EOS #: 0.2 K/UL (ref 0–0.4)
ABSOLUTE LYMPH #: 1.4 K/UL (ref 1–4.8)
ABSOLUTE MONO #: 0.8 K/UL (ref 0.1–1.3)
ANION GAP SERPL CALCULATED.3IONS-SCNC: 11 MMOL/L (ref 9–17)
BASOPHILS # BLD: 1 % (ref 0–2)
BASOPHILS ABSOLUTE: 0.1 K/UL (ref 0–0.2)
BUN BLDV-MCNC: 13 MG/DL (ref 6–20)
CALCIUM SERPL-MCNC: 8.8 MG/DL (ref 8.6–10.4)
CHLORIDE BLD-SCNC: 105 MMOL/L (ref 98–107)
CO2: 25 MMOL/L (ref 20–31)
CREAT SERPL-MCNC: 0.68 MG/DL (ref 0.5–0.9)
EOSINOPHILS RELATIVE PERCENT: 3 % (ref 0–4)
GFR SERPL CREATININE-BSD FRML MDRD: >60 ML/MIN/1.73M2
GLUCOSE BLD-MCNC: 108 MG/DL (ref 65–105)
GLUCOSE BLD-MCNC: 112 MG/DL (ref 65–105)
GLUCOSE BLD-MCNC: 82 MG/DL (ref 70–99)
GLUCOSE BLD-MCNC: 89 MG/DL (ref 65–105)
GLUCOSE BLD-MCNC: 89 MG/DL (ref 65–105)
HCT VFR BLD CALC: 36 % (ref 36–46)
HEMOGLOBIN: 11.6 G/DL (ref 12–16)
LYMPHOCYTES # BLD: 22 % (ref 24–44)
MCH RBC QN AUTO: 30.5 PG (ref 26–34)
MCHC RBC AUTO-ENTMCNC: 32.1 G/DL (ref 31–37)
MCV RBC AUTO: 95.2 FL (ref 80–100)
MONOCYTES # BLD: 12 % (ref 1–7)
PDW BLD-RTO: 17.4 % (ref 11.5–14.9)
PLATELET # BLD: 257 K/UL (ref 150–450)
PMV BLD AUTO: 7.1 FL (ref 6–12)
POTASSIUM SERPL-SCNC: 3.6 MMOL/L (ref 3.7–5.3)
RBC # BLD: 3.79 M/UL (ref 4–5.2)
SEG NEUTROPHILS: 62 % (ref 36–66)
SEGMENTED NEUTROPHILS ABSOLUTE COUNT: 3.8 K/UL (ref 1.3–9.1)
SODIUM BLD-SCNC: 141 MMOL/L (ref 135–144)
WBC # BLD: 6.2 K/UL (ref 3.5–11)

## 2022-10-30 PROCEDURE — 99223 1ST HOSP IP/OBS HIGH 75: CPT | Performed by: INTERNAL MEDICINE

## 2022-10-30 PROCEDURE — 6370000000 HC RX 637 (ALT 250 FOR IP)

## 2022-10-30 PROCEDURE — 85025 COMPLETE CBC W/AUTO DIFF WBC: CPT

## 2022-10-30 PROCEDURE — 97530 THERAPEUTIC ACTIVITIES: CPT

## 2022-10-30 PROCEDURE — 6370000000 HC RX 637 (ALT 250 FOR IP): Performed by: PSYCHIATRY & NEUROLOGY

## 2022-10-30 PROCEDURE — 2060000000 HC ICU INTERMEDIATE R&B

## 2022-10-30 PROCEDURE — 2580000003 HC RX 258

## 2022-10-30 PROCEDURE — 97162 PT EVAL MOD COMPLEX 30 MIN: CPT

## 2022-10-30 PROCEDURE — 36415 COLL VENOUS BLD VENIPUNCTURE: CPT

## 2022-10-30 PROCEDURE — 80048 BASIC METABOLIC PNL TOTAL CA: CPT

## 2022-10-30 PROCEDURE — 99253 IP/OBS CNSLTJ NEW/EST LOW 45: CPT | Performed by: PSYCHIATRY & NEUROLOGY

## 2022-10-30 PROCEDURE — 82947 ASSAY GLUCOSE BLOOD QUANT: CPT

## 2022-10-30 PROCEDURE — 99223 1ST HOSP IP/OBS HIGH 75: CPT | Performed by: PSYCHIATRY & NEUROLOGY

## 2022-10-30 RX ORDER — QUETIAPINE FUMARATE 50 MG/1
50 TABLET, FILM COATED ORAL NIGHTLY
Status: DISCONTINUED | OUTPATIENT
Start: 2022-10-30 | End: 2022-10-31

## 2022-10-30 RX ORDER — LORAZEPAM 2 MG/ML
2 INJECTION INTRAMUSCULAR EVERY 5 MIN PRN
Status: DISCONTINUED | OUTPATIENT
Start: 2022-10-30 | End: 2022-11-01 | Stop reason: HOSPADM

## 2022-10-30 RX ORDER — OLANZAPINE 10 MG/1
5 TABLET ORAL NIGHTLY
Status: DISCONTINUED | OUTPATIENT
Start: 2022-10-30 | End: 2022-10-31

## 2022-10-30 RX ADMIN — SENNOSIDES 8.6 MG: 8.6 TABLET, FILM COATED ORAL at 09:57

## 2022-10-30 RX ADMIN — Medication 1 CAPSULE: at 09:58

## 2022-10-30 RX ADMIN — OLANZAPINE 5 MG: 10 TABLET, FILM COATED ORAL at 20:21

## 2022-10-30 RX ADMIN — SODIUM CHLORIDE, PRESERVATIVE FREE 10 ML: 5 INJECTION INTRAVENOUS at 20:25

## 2022-10-30 RX ADMIN — AMLODIPINE BESYLATE 10 MG: 10 TABLET ORAL at 09:57

## 2022-10-30 RX ADMIN — LACOSAMIDE 150 MG: 150 TABLET, FILM COATED ORAL at 09:57

## 2022-10-30 RX ADMIN — ANTI-FUNGAL POWDER MICONAZOLE NITRATE TALC FREE: 1.42 POWDER TOPICAL at 20:28

## 2022-10-30 RX ADMIN — ONDANSETRON 4 MG: 4 TABLET, ORALLY DISINTEGRATING ORAL at 20:20

## 2022-10-30 RX ADMIN — APIXABAN 5 MG: 5 TABLET, FILM COATED ORAL at 09:57

## 2022-10-30 RX ADMIN — SODIUM CHLORIDE, PRESERVATIVE FREE 10 ML: 5 INJECTION INTRAVENOUS at 06:27

## 2022-10-30 RX ADMIN — QUETIAPINE FUMARATE 50 MG: 50 TABLET ORAL at 20:21

## 2022-10-30 RX ADMIN — LAMOTRIGINE 125 MG: 100 TABLET ORAL at 20:24

## 2022-10-30 RX ADMIN — APIXABAN 5 MG: 5 TABLET, FILM COATED ORAL at 20:21

## 2022-10-30 RX ADMIN — LEVETIRACETAM 750 MG: 750 TABLET, FILM COATED ORAL at 20:21

## 2022-10-30 RX ADMIN — PANTOPRAZOLE SODIUM 40 MG: 40 TABLET, DELAYED RELEASE ORAL at 09:57

## 2022-10-30 RX ADMIN — LEVETIRACETAM 750 MG: 750 TABLET, FILM COATED ORAL at 09:57

## 2022-10-30 RX ADMIN — SODIUM CHLORIDE, PRESERVATIVE FREE 10 ML: 5 INJECTION INTRAVENOUS at 10:02

## 2022-10-30 RX ADMIN — Medication 1 CAPSULE: at 20:21

## 2022-10-30 RX ADMIN — LACOSAMIDE 150 MG: 150 TABLET, FILM COATED ORAL at 20:21

## 2022-10-30 RX ADMIN — Medication 6 MG: at 20:21

## 2022-10-30 RX ADMIN — ANTI-FUNGAL POWDER MICONAZOLE NITRATE TALC FREE: 1.42 POWDER TOPICAL at 10:02

## 2022-10-30 RX ADMIN — SENNOSIDES 8.6 MG: 8.6 TABLET, FILM COATED ORAL at 20:21

## 2022-10-30 RX ADMIN — LAMOTRIGINE 125 MG: 100 TABLET ORAL at 09:59

## 2022-10-30 RX ADMIN — SENNOSIDES AND DOCUSATE SODIUM 2 TABLET: 50; 8.6 TABLET ORAL at 20:24

## 2022-10-30 ASSESSMENT — PAIN SCALES - WONG BAKER: WONGBAKER_NUMERICALRESPONSE: 0

## 2022-10-30 ASSESSMENT — PAIN SCALES - GENERAL: PAINLEVEL_OUTOF10: 0

## 2022-10-30 NOTE — CONSULTS
[facility] Neurology   IN-PATIENT SERVICE      NEUROLOGY CONSULT  NOTE            Date:   10/30/2022  Patient name:  Karli Nash [facility]  Date of admission:  10/29/2022  YOB: 1965      Chief Complaint:     Chief Complaint   Patient presents with    Altered Mental Status       Reason for Consult:      Speech difficulties    History of Present Illness: The patient is a 59 y.o. female who presents with Altered Mental Status  . The patient was seen and examined and the chart was reviewed. Patient recently hospitalized at [facility] for status epilepticus, altered mental status discharged back on 10/25. Presents back to the hospital on 10/30 due to daughter noticing that patient is confused and having difficulty with speech. She was evaluated by stroke team.  Found to have NIHSS 5 for severe aphasia, mild to moderate dysarthria, LOC questions. CTA shows no significant stenosis or occlusion with left P-comm infundibulum versus aneurysm. CT brain no acute intracranial normalities. MRI of the brain was done done with and without contrast showing no acute intracranial abnormalities. Patient admitted for further work-up. She continues to have difficulties with her speech today. She states she has difficulty remembering things and at times words come out wrong. She states that has been ongoing since she was discharged. She also states she is continuing to have auditory hallucinations which she experienced at [facility] and was started on Seroquel for that at the time. Eventually this was switched to Zyprexa. She is on lacosamide 150 mg twice daily, Keppra 750 mg twice daily, Lamictal 125 mg twice daily. She does not appear to be having any further seizures. She denies any further seizures. Past Medical History:     Past Medical History:   Diagnosis Date    Anemia     Bleeding 10/2020    intra-abdominal bleeding -due to splenic mass with GI infiltration.   Status post embolization    [facility]     Depression     Diabetes mellitus ([facility]     GERD (gastroesophageal reflux disease)     Hx of blood clots     Hypertension     Metastatic cancer ([facility] [address] 10/2020    extensive intraabdominal and splenic involvement and lung mets.  Ovarian cancer ([facility] [address]     low grade serous ovarian carcinoma    Post chemo evaluation     2007: Chemo via med onc (Dr. Tracie Kenney), 2008: Dione Dark due to rising CA-125, 2013: intraperitoneal chemo,12/2015: Ca125 - 25     PRES (posterior reversible encephalopathy syndrome)     Splenic lesion         Past Surgical History:     Past Surgical History:   Procedure Laterality Date    ABSCESS DRAINAGE  2013    Franca rectal    ANUS SURGERY      ANAL FISSURECTOMY    CARDIAC CATHETERIZATION      COLECTOMY  03/2013    ex lap, tumor debulking, transverse colectomy w reanastamosis, subgastric omentectomy, intraperitoneal port placement    HYSTERECTOMY, TOTAL ABDOMINAL (CERVIX REMOVED)      IR EMBOLIZATION HEMORRHAGE  10/05/2020    intra-abdominal bleeding -due to splenic mass with GI infiltration. Status post embolization [employer] interlock coils x7. mri condtional 3t ok, safe immediately post implant.  IR PORT PLACEMENT EQUAL OR GREATER THAN 5 YEARS  08/24/2020    IR PORT PLACEMENT EQUAL OR GREATER THAN 5 YEARS 8/24/2020 Ld Pickett MD [facility]    PORT SURGERY      IP Port    TONSILLECTOMY          Medications Prior to Admission:     Prior to Admission medications    Medication Sig Start Date End Date Taking? Authorizing Provider   lacosamide (VIMPAT) 150 MG TABS tablet Take 1 tablet by mouth 2 times daily.  10/14/22 10/15/25  ESTRELLITA Martinez - CNP   levETIRAcetam (KEPPRA) 750 MG tablet Take 1 tablet by mouth 2 times daily  Patient taking differently: Take 750 mg by mouth 2 times daily Might be taking a different dose 10/14/22   ESTRELLITA Martinez - CNP   OLANZapine (ZYPREXA) 5 MG tablet Take 1 tablet by mouth nightly  Patient taking differently: Take 10 mg by mouth nightly 10/13/22   Qamar Short MD   amLODIPine (NORVASC) 10 MG tablet Take 1 tablet by mouth daily 10/11/22   Jerad Moon MD   metoprolol tartrate (LOPRESSOR) 25 MG tablet Take 1 tablet by mouth 2 times daily 10/10/22   Jerad Moon MD   miconazole (ZEASORB-AF) 2 % powder Apply topically 2 times daily. 10/10/22   Carole Dumont MD   STIMULANT LAXATIVE 8.6-50 MG per tablet TAKE 2 TABLETS BY MOUTH NIGHTLY.  10/6/22   Shanda Guzman, DO   lamoTRIgine (LAMICTAL) 25 MG tablet Take 5 tablets by mouth 2 times daily 9/22/22   Shanda Hinds, DO   sertraline (ZOLOFT) 100 MG tablet TAKE 1 TABLET BY MOUTH DAILY 9/21/22 10/21/22  Irene Bhatia MD   FEROSUL 325 (65 Fe) MG tablet TAKE 1 TABLET BY MOUTH ONCE DAILY WITH BREAKFAST  Patient not taking: No sig reported 9/9/22   Kadeem Benavides MD   pantoprazole (PROTONIX) 40 MG tablet Take 1 tablet by mouth daily 9/1/22   Irene Bhatia MD   Lactobacillus (ACIDOPHILUS) CAPS capsule TAKE 1 TABLET BY MOUTH ONCE DAILY IN THE MORNING AND 1 TABLET BEFORE BEDTIME 8/31/22   Shanda Hinds DO   dicyclomine (BENTYL) 20 MG tablet TAKE 1 TABLET (20 MG TOTAL) BY MOUTH IN THE MORNING AND 1 TABLET (20 MG TOTAL) BEFORE BEDTIME. 8/29/22   Shanda Hinds DO   Handicap Placard MISC 5 years 7/19/22   Kayley Hinds, DO   loperamide (IMODIUM) 2 MG capsule Take 2 mg by mouth 4 times daily as needed for Diarrhea    Historical Provider, MD   ondansetron (ZOFRAN-ODT) 4 MG disintegrating tablet Take 1 tablet by mouth every 8 hours as needed for Nausea or Vomiting 5/13/22   Kadeem Benavides MD   senna (SENOKOT) 8.6 MG tablet Take 1 tablet by mouth 2 times daily 5/2/22 5/2/23  Romain Huang MD   melatonin 5 MG TABS tablet Take 1 tablet by mouth daily 4/13/22   Shanda Medhkour, DO   benzonatate (TESSALON PERLES) 100 MG capsule Take 1 capsule by mouth 3 times daily as needed for Cough 4/13/22   Shanda Medhkour, DO   apixaban (ELIQUIS) 5 MG TABS tablet Take 1 tablet by mouth 2 times daily 22   Damion Thorne MD   hydrocortisone (ANUSOL-HC) 2.5 % CREA rectal cream Apply on affected area twice daily 22   Carson Scanlon MD        Allergies:     Carboplatin and Ceftriaxone    Social History:     Tobacco:    reports that she has been smoking cigarettes. She has a 20.00 pack-year smoking history. She has never used smokeless tobacco.  Alcohol:      reports that she does not currently use alcohol. Drug Use:  reports no history of drug use. Family History:     Family History   Problem Relation Age of Onset    Alcohol Abuse Mother     Cirrhosis Mother        Review of Systems:       Constitutional Negative for fever and chills   HEENT Negative for ear discharge, ear pain, nosebleed. Negative for photophobia, headache. Musculoskeletal Negative for joint pain, negative for myalgia   Respiratory Negative for cough, dyspnea. Negative for hemoptysis and sputum. Cardiovascular Negative for palpitations, chest pain. Negative for orthopnea, claudication. Gastrointestinal Negative for nausea, vomiting. Negative for abdominal pain, diarrhea, blood in stool   Genitourinary  Negative for dysuria, hematuria. Negative for suprapubic pain. Negative for bladder incontinence. Skin Negative for rash or itching   Hematology Negative for ecchymosis, anemia   Psychiatric Positive for anxiety, depression.  Negative for suicidal ideation, positive for hallucinations         Physical Exam:   BP (!) 110/92   Pulse 78   Temp 98.1 °F (36.7 °C) (Oral)   Resp 18   Wt 134 lb 11.2 oz (61.1 kg)   SpO2 98%   BMI 22.42 kg/m²   Temp (24hrs), Av.6 °F (37 °C), Min:98.1 °F (36.7 °C), Max:99.8 °F (37.7 °C)        General examination:      General Appearance:  alert, well appearing, and in no acute distress  HEENT: Normocephalic, atraumatic, moist mucus membranes  Neck: supple, no carotid bruits, (-) nuchal rigidity  Lungs:  Respirations unlabored, chest wall no deformity, BS normal  Cardiovascular: normal rate, regular rhythm  Abdomen: Soft, nontender, nondistended, normal bowel sounds  Skin: No gross lesions, rashes, bruising or bleeding on exposed skin area  Extremities:  peripheral pulses palpable, no cyanosis, clubbing or edema  Psych: Flat affect      Neurological examination:    Mental status   Alert and oriented x 3; following all commands; speech is hesitant, occasionally will say words out of context. Difficulty with recall. Cranial nerves   II - visual fields intact to confrontation; pupils reactive  III, IV, VI - extraocular muscles intact; no JOSE A; no nystagmus; no ptosis   V - normal facial sensation                                                               VII - normal facial symmetry                                                             VIII - intact hearing                                                                             IX, X - symmetrical palate elevation                                               XI - symmetrical shoulder shrug                                                       XII - midline tongue without atrophy or fasciculation     Motor function  Strength: 5/5 RUE, 5/5 RLE, 5/5 LUE, 5/5  LLE  Normal bulk and tone. No tremors                      Sensory function Intact to touch, pin, vibration, proprioception throughout     Cerebellar Intact finger-nose-finger testing. Intact heel-shin testing. No dysdiadochokinesia present. Reflex function 2/4 symmetric throughout . Downgoing plantar response bilaterally.  (-)Spear's sign bilaterally    Gait                  Normal station and gait             Diagnostics:      Laboratory Testing:  CBC:   Recent Labs     10/29/22  1455 10/30/22  0456   WBC 10.2 6.2   HGB 12.2 11.6*    257     BMP:    Recent Labs     10/29/22  1500 10/29/22  1648 10/30/22  0456   NA  --  137 141   K  --  3.1* 3.6*   CL  --  100 105   CO2  --  27 25   BUN  --  9 13   CREATININE  --  0.64 0. 68   GLUCOSE 105 97 82         Lab Results   Component Value Date    CHOL 131 03/10/2021    LDLCHOLESTEROL 69 03/10/2021    HDL 33 (L) 03/10/2021    TRIG 147 03/10/2021    ALT 6 10/29/2022    AST 15 10/29/2022    TSH 2.93 03/09/2021    INR 1.2 10/29/2022    LABA1C 5.2 04/13/2022    ROXOXLON07 654 06/04/2022     Imaging/Diagnostics:      EEG (10/5/2022): Electroclinical seizures recorded which were associated with left hand flapping movements and left head version. These arise from the left hemisphere. In addition there were frequent left central parietal sharp waves and LPD's present. MRI BRAIN WO CONTRAST    Narrative  EXAMINATION:  MRI OF THE BRAIN WITHOUT CONTRAST  10/4/2022 3:55 pm    TECHNIQUE:  Multiplanar multisequence MRI of the brain was performed without the  administration of intravenous contrast.    COMPARISON:  CT head 1 day prior. MRI brain 02/24/2022    HISTORY:  ORDERING SYSTEM PROVIDED HISTORY: encephalopathy, hx of CA, hx of prior PRES  with subsequent seizure disorder  TECHNOLOGIST PROVIDED HISTORY:  encephalopathy, hx of CA, hx of prior PRES with subsequent seizure disorder  Reason for Exam: Altered Mental Status - encephalopathy, hx of CA, hx of  prior PRES with subsequent seizure disorder    FINDINGS:  Motion limited evaluation. INTRACRANIAL STRUCTURES/VENTRICLES: No diffusion restriction to suggest acute  infarct. Subtle small focus of gas that subtle new small focus of cortical  and subcortical T2/FLAIR hyperintensity involving the lateral left frontal  lobe best seen on FLAIR image 21. Stable small foci of right occipital  encephalomalacia with mild surrounding gliosis. No mass effect or midline  shift. No evidence of an acute intracranial hemorrhage. The ventricles and  sulci are normal in size and configuration. The sellar/suprasellar regions  appear unremarkable. The normal signal voids within the major intracranial  vessels appear maintained.     ORBITS: The visualized portion of the orbits demonstrate no acute abnormality. SINUSES: The visualized paranasal sinuses and mastoid air cells demonstrate  no acute abnormality. BONES/SOFT TISSUES: The bone marrow signal intensity appears normal. The soft  tissues demonstrate no acute abnormality. Impression  1. New subtle focus of T2/FLAIR hyperintensity in the lateral left frontal  cortex and subcortical white matter is nonspecific and may reflect sequela of  prior ischemia or mild posterior reversal encephalopathy syndrome. Short  interval follow-up is recommended. 2. No evidence of acute infarct or intracranial hemorrhage. 3. Stable small foci of right occipital encephalomalacia in keeping with  sequela of prior insult. Results for orders placed during the hospital encounter of 10/29/22    MRI BRAIN W WO CONTRAST    Narrative  EXAMINATION:  MRI OF THE BRAIN WITHOUT AND WITH CONTRAST  10/29/2022 9:01 pm    TECHNIQUE:  Multiplanar multisequence MRI of the head/brain was performed without and  with the administration of intravenous contrast.    COMPARISON:  10/29/2022    HISTORY:  ORDERING SYSTEM PROVIDED HISTORY: AMS  TECHNOLOGIST PROVIDED HISTORY:  AMS  Reason for Exam: AMS    FINDINGS:  Motion artifact challenge evaluation degrades image quality. INTRACRANIAL STRUCTURES/VENTRICLES:  There is no acute infarct. Mild  involutional change brain parenchyma identified prominent ventricles sulci. Mild periventricular subcortical T2 prolongation identified suggestive  chronic small vessel disease. Evidence of remote encephalomalacia identified  right occipital and posterior temporal lobe. Mild gliosis identified right  frontal deep white matter. No mass effect or midline shift. No evidence of an acute intracranial  hemorrhage. The ventricles and sulci are normal in size and configuration. The sellar/suprasellar regions appear unremarkable.   The normal signal voids  within the major intracranial vessels appear maintained. No abnormal focus  of enhancement is seen within the brain. ORBITS: The visualized portion of the orbits demonstrate no acute abnormality. SINUSES: The visualized paranasal sinuses and mastoid air cells demonstrate  no acute abnormality. BONES/SOFT TISSUES: The bone marrow signal intensity appears normal. The soft  tissues demonstrate no acute abnormality. Impression  No acute stroke, midline shift mass effect. Chronic microvascular disease. Right sided posterior temporal and occipital lobe encephalomalacia. No results found for this or any previous visit. Results for orders placed during the hospital encounter of 10/29/22    CT HEAD WO CONTRAST    Narrative  EXAMINATION:  CT OF THE HEAD WITHOUT CONTRAST  10/29/2022 3:02 pm    TECHNIQUE:  CT of the head was performed without the administration of intravenous  contrast. Automated exposure control, iterative reconstruction, and/or weight  based adjustment of the mA/kV was utilized to reduce the radiation dose to as  low as reasonably achievable. COMPARISON:  MRI brain October 9, 2022    HISTORY:  ORDERING SYSTEM PROVIDED HISTORY: Stroke Symptoms  TECHNOLOGIST PROVIDED HISTORY:    Stroke Symptoms  Decision Support Exception - unselect if not a suspected or confirmed  emergency medical condition->Emergency Medical Condition (MA)  Reason for Exam: patient here with ams, aphasia and slurred speech    FINDINGS:  BRAIN/VENTRICLES: There are small old infarctions in the right occipital lobe  and left frontal, stable. There is periventricular white matter low  attenuation, likely related to minimal chronic microvascular disease. There  is no acute intracranial hemorrhage, mass effect or midline shift. No  abnormal extra-axial fluid collection. The gray-white differentiation is  maintained without evidence of an acute infarct. There is no evidence of  hydrocephalus.     ORBITS: The visualized portion of the orbits demonstrate no acute abnormality. SINUSES: The visualized paranasal sinuses and mastoid air cells demonstrate  no acute abnormality. SOFT TISSUES/SKULL:  No acute abnormality of the visualized skull or soft  tissues. Impression  No acute intracranial abnormality. 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 Minimal chronic microvascular disease. I personally reviewed all of the above medications, clinical laboratory, imaging and other diagnostic tests. Impression:      Episodes of speech difficulty, questionable expressive aphasia. Recent admission with focal status epilepticus with seizures arising from left hemisphere  History of PRES  Auditory hallucinations    Plan:     Patient had left hemispheric onset of her focal seizures during recent admission. With episodes of expressive aphasia cannot exclude further partial seizures. We will update EEG. At this time continue current antiepileptic regimen of Keppra 750 mg twice daily, lacosamide 150 mg twice daily, Lamictal 125 mg twice daily  Seizure precautions  Ativan as needed  MRI brain did not reveal any acute intracranial abnormalities  Consider psychiatry consult for hallucinations. She is currently on Zyprexa for this. We will follow       Thank you for this very interesting consultation.       Electronically signed by [employer] FeTP TherapeuticsDO on 10/30/2022 at 2:06 PM      Mari Arie, [address] [place] [address]  Neurology

## 2022-10-30 NOTE — DISCHARGE INSTR - COC
Continuity of Care Form    Patient Name: Blanca Ennis   :  1965  MRN:  [id number]    Admit date:  10/29/2022  Discharge date:  22    Code Status Order: Full Code   Advance Directives:     Admitting Physician:  Talya Brown MD  PCP: Carmine Phillip DO    Discharging Nurse: Angelic Harry Unit/Room#: 2085/2085-01  Discharging Unit Phone Number: [phone]    Emergency Contact:   Extended Emergency Contact Information  Primary Emergency Contact: Mak Nixon  Address: [address] Phone: [phone]  Mobile Phone: [phone]  Relation: Child  Secondary Emergency Contact: Manda Clay hipaa  Address: * NOT [facility] [address] Phone: [phone]  Work Phone: [phone]  Mobile Phone: [phone]  Relation: Child    Past Surgical History:  Past Surgical History:   Procedure Laterality Date    ABSCESS DRAINAGE      Franca rectal    ANUS SURGERY      ANAL FISSURECTOMY    CARDIAC CATHETERIZATION      COLECTOMY  2013    ex lap, tumor debulking, transverse colectomy w reanastamosis, subgastric omentectomy, intraperitoneal port placement    HYSTERECTOMY, TOTAL ABDOMINAL (CERVIX REMOVED)      IR EMBOLIZATION HEMORRHAGE  10/05/2020    intra-abdominal bleeding -due to splenic mass with GI infiltration. Status post embolization [employer] interlock coils x7. mri condtional 3t ok, safe immediately post implant.     IR PORT PLACEMENT EQUAL OR GREATER THAN 5 YEARS  2020    IR PORT PLACEMENT EQUAL OR GREATER THAN 5 YEARS 2020 Aisha Reese MD [facility] SPECIAL PROCEDURES    PORT SURGERY      IP Port    TONSILLECTOMY         Immunization History:   Immunization History   Administered Date(s) Administered    Hepatitis B 2008    Influenza A ([id number]) Vaccine PF IM 2009    Influenza, FLUARIX, FLULAVAL, FLUZONE (age 10 mo+) AND AFLURIA, (age 1 y+), PF, 0.5mL 10/21/2020    Influenza, FLUCELVAX, (age 10 mo+), MDCK, PF, 0.5mL 11/07/2018, 11/21/2019    Pneumococcal Conjugate 13-valent (Gaicyev23) 10/21/2020    Pneumococcal Polysaccharide (Yjtihyxbt78) 12/01/2010, 12/11/2020       Active Problems:  Patient Active Problem List   Diagnosis Code    Epithelial ovarian cancer, FIGO stage OSIRIS ([facility] [address] C56.9    ACP (advance care planning) Z71.89    Anemia D64.9    Splenic abscess D73.3    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 [facility]) C79.60    Acute encephalopathy G93.40    PRES (posterior reversible encephalopathy syndrome) I67.83    Hemianopia, bitemporal H53.47    Encephalopathy acute G93.40    Seizure disorder, status epilepticus, nonconvulsive (HCC) G40.901    History of ovarian cancer Z85.43    Pleural effusion J90    Bandemia D72.825    Cancer, metastatic to bone ([facility]) C79.51    Intractable low back pain M54.59    Fatigue R53.83    Chronic deep vein thrombosis (DVT) of femoral vein of left lower extremity ([facility]) I82.512    Iron deficiency anemia secondary to inadequate dietary iron intake D50.8    Iron malabsorption K90.9    Gynecologic cancer ([facility]) C57.9    Thrombocytosis D75.839    History of deep venous thrombosis (DVT) of distal vein of left lower extremity: On Eliquis Z86.718    Pelvic mass R19.00    Acute on chronic anemia D64.9    AMS (altered mental status) R41.82    Lactic acidosis E87.20    Anxiety F41.9    Diabetes mellitus ([facility]) E11.9    Gastroesophageal reflux disease K21.9    Recurrent major depressive disorder, in partial remission ([facility]) F33.41    Ileus ([facility]) K56.7    Pericardial effusion I31.39    Acute respiratory failure with hypoxia ([facility]) Y41.06    Metabolic encephalopathy D87.95    Confusion R41.0    Urinary tract infection without hematuria N39.0    Seizure disorder ([facility]) G40.909    Leg swelling M79.89    Iron deficiency E61.1    Drug-induced constipation K59.03    Septicemia ([facility]) A41.9    Hallucinations R44.3    C[facility]) [facility] I63.9    Cerebrovascular accident (CVA) associated with severe acute respiratory syndrome coronavirus 2 (SARS-CoV-2) infection ([facility]) U07.1, I63.9    Difficulty with speech R47.9       Isolation/Infection:   Isolation            No Isolation          Patient Infection Status       Infection Onset Added Last Indicated Last Indicated By Review Planned Expiration Resolved Resolved By    None active    Resolved    C-diff Rule Out 10/11/22 10/11/22 10/11/22 Gastrointestinal Panel, Molecular (Ordered)   10/12/22 Padmini Mchugh RN    COVID-19 (Rule Out) 08/20/20 08/20/20 08/21/20 Covid-19 Ambulatory (Ordered)   08/22/20 Rule-Out Test Resulted            Nurse Assessment:  Last Vital Signs: BP (!) 110/92   Pulse 78   Temp 98.1 °F (36.7 °C) (Oral)   Resp 18   Wt 134 lb 11.2 oz (61.1 kg)   SpO2 98%   BMI 22.42 kg/m²     Last documented pain score (0-10 scale):    Last Weight:   Wt Readings from Last 1 Encounters:   10/30/22 134 lb 11.2 oz (61.1 kg)     Mental Status:  oriented, alert, coherent, logical, thought processes intact, and able to concentrate and follow conversation    IV Access:  - None    Nursing Mobility/ADLs:  Walking   Assisted  Transfer  Independent  Bathing  Assisted  Dressing  Assisted  Toileting  Independent  Feeding  Independent  Med Admin  Assisted  Med Delivery   none    Wound Care Documentation and Therapy:  Puncture 01/12/21 Wrist Anterior;Right (Active)   Number of days: 656        Elimination:  Continence: Bowel: Yes  Bladder: Yes  Urinary Catheter: None   Colostomy/Ileostomy/Ileal Conduit: No       Date of Last BM: 11/1/22    Intake/Output Summary (Last 24 hours) at 10/30/2022 1709  Last data filed at 10/30/2022 0643  Gross per 24 hour   Intake --   Output 500 ml   Net -500 ml     I/O last 3 completed shifts:  In: -   Out: 500 [Urine:500]    Safety Concerns: At Risk for Falls    Impairments/Disabilities:      None    Nutrition Therapy:  Current Nutrition Therapy:   - Oral Diet:  General    Routes of Feeding: Oral  Liquids:  Thin Liquids  Daily Fluid Restriction: no  Last Modified Barium Swallow with Video (Video Swallowing Test): not done    Treatments at the Time of Hospital Discharge:   Respiratory Treatments: room air 2L NC prn  Oxygen Therapy:  is not on home oxygen therapy. Ventilator:    - No ventilator support    Rehab Therapies: Physical Therapy and Occupational Therapy  Weight Bearing Status/Restrictions: No weight bearing restrictions  Other Medical Equipment (for information only, NOT a DME order): Other Treatments: Skilled Nursing assessment and monitoring. Medication education and monitoring per protocol, resume previous orders      Patient's personal belongings (please select all that are sent with patient):  Nancy    RN SIGNATURE:  Electronically signed by Kate Gillette RN on 11/1/22 at 2:01 PM EDT    CASE MANAGEMENT/SOCIAL WORK SECTION    Inpatient Status Date: 10/29/2022    Readmission Risk Assessment Score:  Readmission Risk              Risk of Unplanned Readmission:  42           Discharging to [facility]/ [address] [address]  Phone [phone]  Fax [phone]          / signature: Electronically signed by Vinny Armstrong RN on 10/30/22 at 5:09 PM EDT    PHYSICIAN SECTION    Prognosis: Fair    Condition at Discharge: Stable    Rehab Potential (if transferring to Rehab): Fair    Recommended Labs or Other Treatments After Discharge:     Physician Certification: I certify the above information and transfer of Alison Adikns  is necessary for the continuing treatment of the diagnosis listed and that she requires [facility] for less 30 days.      Update Admission H&P: No change in H&P    PHYSICIAN SIGNATURE:  Electronically signed by Brennan Pritchard MD on 11/1/22 at 2:09 PM EDT

## 2022-10-30 NOTE — PLAN OF CARE
Problem: Skin/Tissue Integrity  Goal: Absence of new skin breakdown  Description: 1. Monitor for areas of redness and/or skin breakdown  2. Assess vascular access sites hourly  3. Every 4-6 hours minimum:  Change oxygen saturation probe site  4. Every 4-6 hours:  If on nasal continuous positive airway pressure, respiratory therapy assess nares and determine need for appliance change or resting period.   10/30/2022 1737 by Yadira Shaffer RN  Outcome: Progressing     Problem: Safety - Adult  Goal: Free from fall injury  10/30/2022 1737 by Yadira Shaffer RN  Outcome: Progressing     Problem: ABCDS Injury Assessment  Goal: Absence of physical injury  10/30/2022 1737 by Yadira Shaffer RN  Outcome: Progressing     Problem: Discharge Planning  Goal: Discharge to home or other facility with appropriate resources  10/30/2022 1737 by Yadira Shaffer RN  Outcome: Progressing

## 2022-10-30 NOTE — CARE COORDINATION
CASE MANAGEMENT NOTE:    Admission Date:  10/29/2022 Leigha Teresa is a 59 y.o.  female    Admitted for : Acute CVA (cerebrovascular accident) [facility]) [I63.9]  Cerebrovascular accident (CVA), unspecified mechanism ([facility] [address] [I63.9]  Cerebrovascular accident (CVA) associated with severe acute respiratory syndrome coronavirus 2 (SARS-CoV-2) infection ([facility] [address] [U07.1, I63.9]    Met with:  Patient    PCP:  Dr. Usama Davalos:  Travon Kim      Is patient alert and oriented at time of discussion:  Yes    Current Residence/ Living Arrangements:  in nursing home - [facility]. Spoke with Richi Carrillo from [place] and pt is able to return when medically ready, but requires pre-cert. This will be started tomorrow morning. Does patient go to outpatient dialysis: No    Is patient currently receiving oral anticoagulation therapy? Yes, Eliquis    Discharge Plan:  return to [facility]. Pre-cert will be started tomorrow. Follows with Dr. Evangelist Loya at [facility] for ovarian cancer with mets.               Electronically signed by: Severiano Hagan RN on 10/30/2022 at 5:07 PM

## 2022-10-30 NOTE — PROGRESS NOTES
Physical Therapy  Facility/Department: [facility] PROGRESSIVE CARE  Physical Therapy Initial Assessment    Name: Darrel Sexton  : 1965  MRN: [id number]  Date of Service: 10/30/2022    Discharge Recommendations:  Patient would benefit from continued therapy after discharge   PT Equipment Recommendations  Equipment Needed: No      Patient Diagnosis(es): The encounter diagnosis was Cerebrovascular accident (CVA), unspecified mechanism ([facility] [address] Past Medical History:  has a past medical history of Anemia, Bleeding, Cervical cancer ([facility] [address] Depression, Diabetes mellitus ([facility] [address] GERD (gastroesophageal reflux disease), Hx of blood clots, Hypertension, [facility] Post chemo evaluation, PRES (posterior reversible encephalopathy syndrome), and Splenic lesion. Past Surgical History:  has a past surgical history that includes Hysterectomy, total abdominal; Port Surgery; Tonsillectomy; IR PORT PLACEMENT > 5 YEARS (2020); Anus surgery; Abscess Drainage (); colectomy (2013); IR EMBOLIZATION HEMORRHAGE (10/05/2020); and Cardiac catheterization. Assessment   Body Structures, Functions, Activity Limitations Requiring Skilled Therapeutic Intervention: Decreased functional mobility ; Decreased balance;Decreased cognition  Assessment: Impaired mobility due to safety issues of decreased cognition and balance  Decision Making: Medium Complexity  History: seizure;  Ovarian Cancer with mets  Exam: decreased mobility, balance, cognition  Clinical Presentation: evolving  Requires PT Follow-Up: Yes  Activity Tolerance  Activity Tolerance: Patient tolerated evaluation without incident;Treatment limited secondary to decreased cognition     Plan   Physcial Therapy Plan  General Plan: 3-5 times per week  Current Treatment Recommendations: Balance training, Functional mobility training, Gait training, Transfer training, Safety education & training, Strengthening  Safety Devices  Type of Devices: Bed alarm in place, Left in bed, Patient at risk for falls     Restrictions  Restrictions/Precautions  Restrictions/Precautions: Fall Risk, Seizure     Subjective   Pain: pt reports pain in the middle of her back  General  Family / Caregiver Present: No  Follows Commands: Impaired (follows simple commands)  General Comment  Comments: pt is pleasant and cooperative  Subjective  Subjective: pt appears to be responding to auditory stimuli not from PT; pt does not understand questions at times or has inappropriate reponses at times         Social/Functional History  Social/Functional History  Lives With:  (exhusband)  Type of Home: House  Home Layout: Two level, Able to Live on Main level with bedroom/bathroom  Home Access: Stairs to enter without rails  Entrance Stairs - Number of Steps: 5  Bathroom Shower/Tub: Tub/Shower unit, Shower chair with back  [employer]: Grab bars in shower  Home Equipment: Walker, rolling  Has the patient had two or more falls in the past year or any fall with injury in the past year?: No  Ambulation Assistance: Independent  Transfer Assistance: Independent  Active : No  Additional Comments: daughter able to help per pt. pt is a questionable historian.  pt is currently at a SNF  Vision/Hearing  Vision  Vision: Impaired  Vision Exceptions: Wears glasses at all times  Hearing  Hearing: Within functional limits    Cognition   Orientation  Orientation Level: Oriented to person;Oriented to time;Oriented to place;Oriented to situation     Objective   O2 Device: Nasal cannula (2.5L)     AROM RLE (degrees)  RLE AROM: WNL  AROM LLE (degrees)  LLE AROM : WNL  Strength RLE  Comment: grossly 4/5  Strength LLE  Comment: grossly 4/5  Strength Other  Other: pt had difficulty understanding MMT commands        Bed mobility  Supine to Sit: Stand by assistance (head of bed slightly elevated)  Sit to Supine: Stand by assistance  Scooting: Stand by assistance  Transfers  Sit to Stand: Contact guard assistance  Stand to Sit: Contact guard assistance  Ambulation  Surface: Level tile  Device: Rolling Walker  Other Apparatus: O2 (2.5L)  Assistance: Minimal assistance  Distance: 5ft x 2  Comments: pt had difficulty figuring out how to take staps backward with RW- needed assist with walker  More Ambulation?: No  Stairs/Curb  Stairs?: No     Balance  Sitting - Static: Good  Sitting - Dynamic: Good  Standing - Static: Fair;+ (SBA)  Standing - Dynamic: Fair (CGA)         AM-PAC Score  AM-PAC Inpatient Mobility Raw Score : 16 (10/30/22 1332)  AM-PAC Inpatient T-Scale Score : 40.78 (10/30/22 1332)  Mobility Inpatient CMS 0-100% Score: 54.16 (10/30/22 1332)  Mobility Inpatient CMS G-Code Modifier : CK (10/30/22 1332)        Goals  Short Term Goals  Time Frame for Short Term Goals: 2-3 days  Short Term Goal 1: transfers with SBA  Short Term Goal 2: ambulate with RW and CGA x 30-40ft  Short Term Goal 3: pt able to tolerate 15-20min of therapeutic activity/exercise       Therapy Time   Individual Concurrent Group Co-treatment   Time In 1300         Time Out 1324         Minutes 24         Timed Code Treatment Minutes: [address] Efren Wolf, PT

## 2022-10-30 NOTE — PROGRESS NOTES
Physical Therapy        Physical Therapy Cancel Note      DATE: 10/30/2022    NAME: Darrel Sexton  MRN: [id number]   : 1965      Patient not seen this date for Physical Therapy due to:    Patient Declined: pt requested that PT return after lunch.  Will attempt to see pt in PM if able      Electronically signed by Derwood Osler, PT on 10/30/2022 at 10:46 AM

## 2022-10-30 NOTE — CONSULTS
Department of Psychiatry  [facility]: Admitted with aphasia. Patient's family feels that symptoms started after the patient was switched from Seroquel to Zyprexa    CONSULTING PHYSICIAN: Dr. Trina Clayton    History obtained from: Patient, patient's daughter and chart    HISTORY OF PRESENT ILLNESS:    The patient is a 59 y.o. female with a medical history significant for recurrent ovarian cancer with originally diagnosed in 2005. She has been into peritoneal carcinomatosis s/p surgical debulking. The patient was recently hospitalized at [facility] for status epilepticus and was discharged on 10/25/2022. The patient was admitted with altered mental status for the management of suspected cerebrovascular accident. She came in with difficulty in talking. The patient's daughter noticed that the patient's speech was completely garbled and difficult to understand.    -The patient was accompanied by her daughter and minor grandchildren. The grandchildren were asked to step out for the assessment. The patient introduced her daughter to me as her sister. She was anxious and had difficulty giving a linear and coherent account of her medical history or the circumstances that led to her admission. The patient was able to give me snippets of information about her personal history which she could not recall the title of her job. Her daughter had to help her. The patient admits to auditory hallucinations. She has a lot of anxiety. She denies any suicidal thoughts. The patient has no visual hallucinations.  -Oriented to place and situation. Knows day. Not date, month and year. Can name current president but not the last one. The patient states she knows nothing about what is going on in the world because she has been in the hospital.  -I have noted that the patient is currently on lacosamide Keppra and Lamictal.  She is also receiving Zyprexa 10 mg nightly.   -The patient became tearful when talking about the loss of 2 of her children. She did recall that her youngest son had  of a drug overdose. Psychiatric Review of Systems            Obsessions and Compulsions: Denies       Isela or Hypomania: Denies     Hallucinations: Denies     Panic Attacks:  Denies     Delusions:  Denies     Phobias:  Denies     Trauma: Denies      Substance Abuse History:  ETOH: denies  Marijuana: used MJ when younger  Opiates: none  Other Drugs: none      Past Psychiatric History:  Prior Diagnosis: Anxiety and depression  Has Zoloft for years via PCP. Hospitalization: no  Hx of Suicidal Attempts: no  Hx of violence:  no      Personal History: Born [place] raised in [place] Childhood was ok. She has some college. [employer] was adjudicator for a pharmacy. Has 4 children. Two of them . One  recently and another  4 years ago. Past Medical History:        Diagnosis Date    Anemia     Bleeding 10/2020    intra-abdominal bleeding -due to splenic mass with GI infiltration. Status post embolization    Cervical cancer (HCC)     Depression     Diabetes mellitus ([facility]     GERD (gastroesophageal reflux disease)     Hx of blood clots     Hypertension     Metastatic cancer ([facility] 10/2020    extensive intraabdominal and splenic involvement and lung mets.     Ovarian cancer ([facility]     low grade serous ovarian carcinoma    Post chemo evaluation     2007: Chemo via med onc (Dr. Kathy Mitchell), : Rodkaileyph Boas due to rising CA-125, 2013: intraperitoneal chemo,2015: Ca125 - 25     PRES (posterior reversible encephalopathy syndrome)     Splenic lesion        Past Surgical History:        Procedure Laterality Date    ABSCESS DRAINAGE      Franca rectal    ANUS SURGERY      ANAL FISSURECTOMY    CARDIAC CATHETERIZATION      COLECTOMY  2013    ex lap, tumor debulking, transverse colectomy w reanastamosis, subgastric omentectomy, intraperitoneal port placement    HYSTERECTOMY, TOTAL ABDOMINAL (CERVIX REMOVED)      IR EMBOLIZATION HEMORRHAGE  10/05/2020    intra-abdominal bleeding -due to splenic mass with GI infiltration. Status post embolization [employer] interlock coils x7. mri condtional 3t ok, safe immediately post implant. IR PORT PLACEMENT EQUAL OR GREATER THAN 5 YEARS  08/24/2020    IR PORT PLACEMENT EQUAL OR GREATER THAN 5 YEARS 8/24/2020 Humera Zamora MD [facility]    PORT SURGERY      IP Port    TONSILLECTOMY           Medications Prior to Admission:   Medications Prior to Admission: lacosamide (VIMPAT) 150 MG TABS tablet, Take 1 tablet by mouth 2 times daily. levETIRAcetam (KEPPRA) 750 MG tablet, Take 1 tablet by mouth 2 times daily (Patient taking differently: Take 750 mg by mouth 2 times daily Might be taking a different dose)  OLANZapine (ZYPREXA) 5 MG tablet, Take 1 tablet by mouth nightly (Patient taking differently: Take 10 mg by mouth nightly)  amLODIPine (NORVASC) 10 MG tablet, Take 1 tablet by mouth daily  metoprolol tartrate (LOPRESSOR) 25 MG tablet, Take 1 tablet by mouth 2 times daily  miconazole (ZEASORB-AF) 2 % powder, Apply topically 2 times daily. STIMULANT LAXATIVE 8.6-50 MG per tablet, TAKE 2 TABLETS BY MOUTH NIGHTLY.  lamoTRIgine (LAMICTAL) 25 MG tablet, Take 5 tablets by mouth 2 times daily  sertraline (ZOLOFT) 100 MG tablet, TAKE 1 TABLET BY MOUTH DAILY  FEROSUL 325 (65 Fe) MG tablet, TAKE 1 TABLET BY MOUTH ONCE DAILY WITH BREAKFAST (Patient not taking: No sig reported)  pantoprazole (PROTONIX) 40 MG tablet, Take 1 tablet by mouth daily  Lactobacillus (ACIDOPHILUS) CAPS capsule, TAKE 1 TABLET BY MOUTH ONCE DAILY IN THE MORNING AND 1 TABLET BEFORE BEDTIME  dicyclomine (BENTYL) 20 MG tablet, TAKE 1 TABLET (20 MG TOTAL) BY MOUTH IN THE MORNING AND 1 TABLET (20 MG TOTAL) BEFORE BEDTIME.   Handicap Placard MISC, 5 years  loperamide (IMODIUM) 2 MG capsule, Take 2 mg by mouth 4 times daily as needed for Diarrhea  ondansetron (ZOFRAN-ODT) 4 MG disintegrating tablet, Take 1 tablet by mouth every 8 hours as needed for Nausea or Vomiting  senna (SENOKOT) 8.6 MG tablet, Take 1 tablet by mouth 2 times daily  melatonin 5 MG TABS tablet, Take 1 tablet by mouth daily  benzonatate (TESSALON PERLES) 100 MG capsule, Take 1 capsule by mouth 3 times daily as needed for Cough  apixaban (ELIQUIS) 5 MG TABS tablet, Take 1 tablet by mouth 2 times daily  hydrocortisone (ANUSOL-HC) 2.5 % CREA rectal cream, Apply on affected area twice daily    Allergies:  Carboplatin and Ceftriaxone    FAMILY/SOCIAL HISTORY:  Family History   Problem Relation Age of Onset    Alcohol Abuse Mother     Cirrhosis Mother      Social History     Socioeconomic History    Marital status: Single     Spouse name: Not on file    Number of children: Not on file    Years of education: Not on file    Highest education level: Not on file   Occupational History    Not on file   Tobacco Use    Smoking status: Every Day     Packs/day: 1.00     Years: 20.00     Pack years: 20.00     Types: Cigarettes    Smokeless tobacco: Never   Vaping Use    Vaping Use: Never used   Substance and Sexual Activity    Alcohol use: Not Currently    Drug use: Never    Sexual activity: Not on file   Other Topics Concern    Not on file   Social History Narrative    Not on file     Social Determinants of Health     Financial Resource Strain: Medium Risk    Difficulty of Paying Living Expenses: Somewhat hard   Food Insecurity: Food Insecurity Present    Worried About Running Out of Food in the Last Year: Often true    Ran Out of Food in the Last Year: Sometimes true   Transportation Needs: Not on file   Physical Activity: Not on file   Stress: Not on file   Social Connections: Not on file   Intimate Partner Violence: Not on file   Housing Stability: Not on file       REVIEW OF SYSTEMS    Constitutional: [] fever  [] chills  [] weight loss  []weakness [] Other:  Eyes:  [] photophobia  [] discharge [] acuity change   [] Diplopia   [] Other:  HENT:  [] sore throat [] ear pain [] Tinnitus   [] Other  Respiratory:  [] Cough  [] Shortness of breath   [] Sputum   [] Other:   Cardiac: []Chest pain   []Palpitations []Edema  []PND  [] Other:  GI:  []Abdominal pain   []Nausea  []Vomiting  []Diarrhea  [] Other:  :  [] Dysuria   []Frequency  []Hematuria  []Discharge  [] Other:  Possible Pregnancy: []Yes   []No   LMP:   Musculoskeletal:  []Back pain  []Neck pain  []Recent Injury   Skin:  []Rash  [] Itching  [] Other:  Neurologic:  [] Headache  [] Focal weakness  [] Sensory changes []Other:  Endocrine:  [] Polyuria  [] Polydipsia  [] Hair Loss  [] Other:  Lymphatic:   [] Swollen glands   Psychiatric:  As per HPI      All other systems negative except as marked or mentioned/indicated in the HPI. Christina Siegel      PHYSICAL EXAM:  Vitals:  BP (!) 110/92   Pulse 78   Temp 98.1 °F (36.7 °C) (Oral)   Resp 18   Wt 134 lb 11.2 oz (61.1 kg)   SpO2 98%   BMI 22.42 kg/m²      Neuro Exam:      Involuntary Movements: No    Mental Status Examination:    Level of consciousness:  within normal limits   Appearance:  hospital attire  Behavior/Motor: Restless  Attitude toward examiner:  cooperative and attentive  Speech:  rapid with some difficulty in finding the right words  Mood: anxious  Affect:  mood congruent  Thought processes:  loose associations   Thought content:  Suicidal Ideation:  denies suicidal ideation  Delusions:  no evidence of delusions  Perceptual Disturbance:  auditory  Cognition:  oriented to person, place, and time   Concentration distractible  Memory impaired recent memory and remote memory  Insight poor   Judgement fair   Fund of Knowledge limited        LABS: REVIEWED TODAY:  Recent Labs     10/29/22  1455 10/30/22  0456   WBC 10.2 6.2   HGB 12.2 11.6*    257     Recent Labs     10/29/22  1500 10/29/22  1648 10/30/22  0456   NA  --  137 141   K  --  3.1* 3.6*   CL  --  100 105   CO2  --  27 25   BUN  --  9 13   CREATININE  --  0.64 0.68   GLUCOSE 105 97 82     Recent Labs 10/29/22  1648   BILITOT 0.3   ALKPHOS 84   AST 15   ALT 6     Lab Results   Component Value Date/Time    BARBSCNU NEGATIVE 02/21/2022 05:58 AM    LABBENZ NEGATIVE 02/21/2022 05:58 AM    LABMETH NEGATIVE 02/21/2022 05:58 AM    PPXUR NOT REPORTED 02/21/2022 05:58 AM     Lab Results   Component Value Date/Time    TSH 2.93 03/09/2021 05:43 PM     No results found for: LITHIUM  No results found for: VALPROATE, CBMZ  No results found for: LITHIUM, VALPROATE    FURTHER LABS ORDERED :      Radiology   CT HEAD WO CONTRAST    Result Date: 10/29/2022  EXAMINATION: CT OF THE HEAD WITHOUT CONTRAST  10/29/2022 3:02 pm TECHNIQUE: CT of the head was performed without the administration of intravenous contrast. Automated exposure control, iterative reconstruction, and/or weight based adjustment of the mA/kV was utilized to reduce the radiation dose to as low as reasonably achievable. COMPARISON: MRI brain October 9, 2022 HISTORY: ORDERING SYSTEM PROVIDED HISTORY: Stroke Symptoms TECHNOLOGIST PROVIDED HISTORY: Stroke Symptoms Decision Support Exception - unselect if not a suspected or confirmed emergency medical condition->Emergency Medical Condition (MA) Reason for Exam: patient here with ams, aphasia and slurred speech FINDINGS: BRAIN/VENTRICLES: There are small old infarctions in the right occipital lobe and left frontal, stable. There is periventricular white matter low attenuation, likely related to minimal chronic microvascular disease. There is no acute intracranial hemorrhage, mass effect or midline shift. No abnormal extra-axial fluid collection. The gray-white differentiation is maintained without evidence of an acute infarct. There is no evidence of hydrocephalus. ORBITS: The visualized portion of the orbits demonstrate no acute abnormality. SINUSES: The visualized paranasal sinuses and mastoid air cells demonstrate no acute abnormality.  SOFT TISSUES/SKULL:  No acute abnormality of the visualized skull or soft tissues. No acute intracranial abnormality. 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 Minimal chronic microvascular disease. CT HEAD WO CONTRAST    Result Date: 10/3/2022  EXAMINATION: CTA OF THE HEAD AND NECK WITH CONTRAST; CT OF THE HEAD WITHOUT CONTRAST 10/3/2022 3:10 pm; 10/3/2022 3:30 pm: TECHNIQUE: CTA of the head and neck was performed with the administration of intravenous contrast. Multiplanar reformatted images are provided for review. MIP images are provided for review. Stenosis of the internal carotid arteries measured using NASCET criteria. Automated exposure control, iterative reconstruction, and/or weight based adjustment of the mA/kV was utilized to reduce the radiation dose to as low as reasonably achievable.; CT of the head was performed without the administration of intravenous contrast. Automated exposure control, iterative reconstruction, and/or weight based adjustment of the mA/kV was utilized to reduce the radiation dose to as low as reasonably achievable. Noncontrast CT of the head with reconstructed 2-D images are also provided for review. COMPARISON: MRI brain performed 02/24/2022. HISTORY: ORDERING SYSTEM PROVIDED HISTORY: AMS TECHNOLOGIST PROVIDED HISTORY: AMS Decision Support Exception - unselect if not a suspected or confirmed emergency medical condition->Emergency Medical Condition (MA); ORDERING SYSTEM PROVIDED HISTORY: AMS TECHNOLOGIST PROVIDED HISTORY: AMS Decision Support Exception - unselect if not a suspected or confirmed emergency medical condition->Emergency Medical Condition (MA) FINDINGS: CT HEAD: BRAIN/VENTRICLES:  There is no acute intracranial hemorrhage, mass effect, or midline shift. There is satisfactory overall gray-white matter differentiation. The ventricular structures are symmetric and unremarkable. The infratentorial structures are unremarkable.  ORBITS: The visualized portion of the orbits demonstrate no acute abnormality. SINUSES:  The visualized paranasal sinuses and mastoid air cells demonstrate no acute abnormality. SOFT TISSUES/SKULL: No acute abnormality of the visualized skull or soft tissues. CTA NECK: AORTIC ARCH/ARCH VESSELS: No dissection or arterial injury. No significant stenosis of the brachiocephalic or subclavian arteries. CAROTID ARTERIES: No dissection, arterial injury, or hemodynamically significant stenosis by NASCET criteria. VERTEBRAL ARTERIES: No dissection, arterial injury, or significant stenosis. SOFT TISSUES: There is consolidation in the visualized lungs bilaterally that is worse on the right compared to the left. No cervical or superior mediastinal lymphadenopathy. The larynx and pharynx are unremarkable. No acute abnormality of the salivary and thyroid glands. BONES: No acute osseous abnormality. CTA HEAD: ANTERIOR CIRCULATION: No significant stenosis of the intracranial internal carotid, anterior cerebral, or middle cerebral arteries. No aneurysm. POSTERIOR CIRCULATION: No significant stenosis of the vertebral, basilar, or posterior cerebral arteries. No aneurysm. OTHER: No dural venous sinus thrombosis on this non-dedicated study. 1. No acute intracranial abnormality. 2. No evidence for significant stenosis or occlusion. 3. Consolidation in the visualized lungs bilaterally that is worse on the right compared to the left. Correlate with report from dedicated CT chest, abdomen, and pelvis for further discussion. XR CHEST PORTABLE    Result Date: 10/29/2022  EXAMINATION: ONE XRAY VIEW OF THE CHEST 10/29/2022 3:35 pm COMPARISON: 10/05/2022 HISTORY: ORDERING SYSTEM PROVIDED HISTORY: stroke symptoms TECHNOLOGIST PROVIDED HISTORY: stroke symptoms FINDINGS: Right chest port remains in place. Heart size is grossly stable. There is elevation of the right diaphragm, which is unchanged. Scattered interstitial thickening in the lungs is not significantly changed.   No evidence of pneumothorax, new airspace consolidation, or sizable pleural effusion. Possible tiny pleural effusions, similar to the previous study. No free air beneath the diaphragm. Visualized loops of bowel in the upper abdomen appear mildly distended with gas. Vascular coil noted in the visualized upper abdomen. 1.  Possible tiny pleural effusions and scattered interstitial opacities in the lungs, similar to 10/05/2022. No new airspace consolidation. 2.  Visualized loops of bowel in the upper abdomen appear mildly distended with gas. Consider abdominal radiographs. XR CHEST PORTABLE    Result Date: 10/5/2022  EXAMINATION: ONE XRAY VIEW OF THE CHEST 10/5/2022 6:28 am COMPARISON: 10/03/2022, 06/03/2022 HISTORY: ORDERING SYSTEM PROVIDED HISTORY: Wheezing TECHNOLOGIST PROVIDED HISTORY: Wheezing FINDINGS: Endotracheal and enteric tubes have been removed. Right internal jugular port remains. Interstitial opacities appear more prominent in the interval. Patchy opacities in the lung bases again demonstrated. No focal area of consolidation, pneumothorax or effusion. Interval extubation. Interstitial opacities have increased in the interval, for which developing edema should be considered. XR CHEST PORTABLE    Result Date: 10/3/2022  EXAMINATION: ONE XRAY VIEW OF THE CHEST 10/3/2022 1:54 pm COMPARISON: Chest radiograph 06/03/2022. HISTORY: ORDERING SYSTEM PROVIDED HISTORY: AMS TECHNOLOGIST PROVIDED HISTORY: AMS FINDINGS: Single view provided. Right-sided subcutaneous medical infusion port with catheter tip in the distal superior vena cava. Endotracheal tube overlies tracheal air column with the tip 5 cm above the alejnadro. Enteric tube follows the course of the esophagus crossing the GE junction and extending into the stomach with the tip at the level of the proximal to mid gastric body. Stable mediastinal and cardiac silhouettes with aortic atherosclerosis. Stable right hemidiaphragm elevation.   Progressive right lung base curvilinear opacity. Stable bilateral perihilar interstitial reticulonodular densities. No lobar consolidation. No effusion or pneumothorax. No free subdiaphragmatic air. Stable embolization coils of the left upper quadrant. Redemonstration of cholelithiasis. 1. Endotracheal tube tip lies 5 cm above the alejandro. 2. Enteric tube in the stomach with the tip at the level of the proximal to mid gastric body. 3. Stable right mid I a frame elevation resulting in right hemithorax volume loss. 4. There is progressive curvilinear right lung base opacity which may represent progressive atelectasis. 5. Stable perihilar interstitial reticulonodular densities. No acute consolidation or pulmonary edema. CTA HEAD NECK W CONTRAST    Result Date: 10/29/2022  EXAMINATION: CTA OF THE HEAD AND NECK WITH CONTRAST 10/29/2022 3:08 pm: TECHNIQUE: CTA of the head and neck was performed with the administration of intravenous contrast. Multiplanar reformatted images are provided for review. MIP images are provided for review. Stenosis of the internal carotid arteries measured using NASCET criteria. Automated exposure control, iterative reconstruction, and/or weight based adjustment of the mA/kV was utilized to reduce the radiation dose to as low as reasonably achievable. COMPARISON: Noncontrast CT head from earlier today, CTA head and neck October 3, 2022, CT chest abdomen pelvis January 5, 2021 HISTORY: ORDERING SYSTEM PROVIDED HISTORY: Stroke Symptoms TECHNOLOGIST PROVIDED HISTORY: Stroke Symptoms Decision Support Exception - unselect if not a suspected or confirmed emergency medical condition->Emergency Medical Condition (MA) Reason for Exam: patient here for ams, aphasia and slurred speech FINDINGS: CTA NECK: AORTIC ARCH/ARCH VESSELS: No dissection or arterial injury. No significant stenosis of the brachiocephalic or subclavian arteries.  CAROTID ARTERIES: No dissection, arterial injury, or hemodynamically significant stenosis by NASCET criteria. VERTEBRAL ARTERIES: No dissection, arterial injury, or significant stenosis. SOFT TISSUES: There is a 4.6 mm lung nodule in the right upper lobe (series 6, image 14). There is a 6.3 mm lung nodule in the right upper lobe (series 6, image 31). There are multiple smaller lung nodules in the bilateral lungs. There are mildly prominent mediastinal lymph nodes, may be related to infection/inflammation. The larynx and pharynx are unremarkable. No acute abnormality of the salivary and thyroid glands. BONES: No acute osseous abnormality. CTA HEAD: ANTERIOR CIRCULATION: No significant stenosis of the intracranial internal carotid, anterior cerebral, or middle cerebral arteries. There is 1.6 mm prominent infundibulum versus saccular aneurysm at the origin of the left posterior communicating artery (series 5, image 49). POSTERIOR CIRCULATION: No significant stenosis of the vertebral, basilar, or posterior cerebral arteries. No aneurysm. OTHER: Limited evaluation of the intracranial dural venous sinuses secondary to suboptimal phase of contrast. BRAIN: No mass effect or midline shift. No extra-axial fluid collection. The gray-white differentiation is maintained. No acute abnormality or flow-limiting stenosis of the major arteries of the head and neck. 1.6 mm prominent infundibulum versus saccular aneurysm at the origin of the left posterior communicating artery Lung nodules measuring up to 6.3 mm, likely related to infection/inflammation, increased since January 5, 2021. Follow-up is recommended to exclude metastasis. Mildly prominent mediastinal lymph nodes, may be related to infection/inflammation, increased since January 5, 2021. Follow-up is recommended to exclude lymph node metastasis.      CTA HEAD NECK W CONTRAST    Result Date: 10/3/2022  EXAMINATION: CTA OF THE HEAD AND NECK WITH CONTRAST; CT OF THE HEAD WITHOUT CONTRAST 10/3/2022 3:10 pm; 10/3/2022 3:30 pm: TECHNIQUE: CTA of the head and neck was performed with the administration of intravenous contrast. Multiplanar reformatted images are provided for review. MIP images are provided for review. Stenosis of the internal carotid arteries measured using NASCET criteria. Automated exposure control, iterative reconstruction, and/or weight based adjustment of the mA/kV was utilized to reduce the radiation dose to as low as reasonably achievable.; CT of the head was performed without the administration of intravenous contrast. Automated exposure control, iterative reconstruction, and/or weight based adjustment of the mA/kV was utilized to reduce the radiation dose to as low as reasonably achievable. Noncontrast CT of the head with reconstructed 2-D images are also provided for review. COMPARISON: MRI brain performed 02/24/2022. HISTORY: ORDERING SYSTEM PROVIDED HISTORY: AMS TECHNOLOGIST PROVIDED HISTORY: AMS Decision Support Exception - unselect if not a suspected or confirmed emergency medical condition->Emergency Medical Condition (MA); ORDERING SYSTEM PROVIDED HISTORY: AMS TECHNOLOGIST PROVIDED HISTORY: AMS Decision Support Exception - unselect if not a suspected or confirmed emergency medical condition->Emergency Medical Condition (MA) FINDINGS: CT HEAD: BRAIN/VENTRICLES:  There is no acute intracranial hemorrhage, mass effect, or midline shift. There is satisfactory overall gray-white matter differentiation. The ventricular structures are symmetric and unremarkable. The infratentorial structures are unremarkable. ORBITS: The visualized portion of the orbits demonstrate no acute abnormality. SINUSES:  The visualized paranasal sinuses and mastoid air cells demonstrate no acute abnormality. SOFT TISSUES/SKULL: No acute abnormality of the visualized skull or soft tissues. CTA NECK: AORTIC ARCH/ARCH VESSELS: No dissection or arterial injury. No significant stenosis of the brachiocephalic or subclavian arteries.  CAROTID ARTERIES: No dissection, arterial injury, or hemodynamically significant stenosis by NASCET criteria. VERTEBRAL ARTERIES: No dissection, arterial injury, or significant stenosis. SOFT TISSUES: There is consolidation in the visualized lungs bilaterally that is worse on the right compared to the left. No cervical or superior mediastinal lymphadenopathy. The larynx and pharynx are unremarkable. No acute abnormality of the salivary and thyroid glands. BONES: No acute osseous abnormality. CTA HEAD: ANTERIOR CIRCULATION: No significant stenosis of the intracranial internal carotid, anterior cerebral, or middle cerebral arteries. No aneurysm. POSTERIOR CIRCULATION: No significant stenosis of the vertebral, basilar, or posterior cerebral arteries. No aneurysm. OTHER: No dural venous sinus thrombosis on this non-dedicated study. 1. No acute intracranial abnormality. 2. No evidence for significant stenosis or occlusion. 3. Consolidation in the visualized lungs bilaterally that is worse on the right compared to the left. Correlate with report from dedicated CT chest, abdomen, and pelvis for further discussion. CT CHEST ABDOMEN PELVIS W CONTRAST    Result Date: 10/3/2022  EXAMINATION: CT OF THE CHEST, ABDOMEN, AND PELVIS WITH CONTRAST 10/3/2022 3:10 pm TECHNIQUE: CT of the chest, abdomen and pelvis was performed with the administration of intravenous contrast. Multiplanar reformatted images are provided for review. Automated exposure control, iterative reconstruction, and/or weight based adjustment of the mA/kV was utilized to reduce the radiation dose to as low as reasonably achievable. COMPARISON: Chest radiograph 10/03/2022. CT abdomen/pelvis 06/03/2022. CT PE study 02/20/2022. HISTORY: ORDERING SYSTEM PROVIDED HISTORY: concerns for mets or bleed TECHNOLOGIST PROVIDED HISTORY: concerns for mets or bleed History of ovarian cancer. FINDINGS: Mediastinum: Normal heart size.   There is a mild dependent pericardial effusion which measures 1.2 cm in thickness and is improved compared to the prior exam.  Right-sided subcutaneous medical infusion port with catheter tip in the right atrium. The pulmonary arteries are normal in size. Adequate contrast opacification to the segmental level with no acute intraluminal filling defect. Mild coronary artery calcifications. Mild thoracic aortic atherosclerotic calcifications with no aneurysmal dilatation or evidence of dissection. The esophagus is decompressed. There is an enteric tube within the esophagus which passes into the stomach. Multifocal mediastinal lymphadenopathy with a right paratracheal lymph node measuring 1.0 x 1.5 cm on axial image 39, this is stable compared to the prior exam.  Stable multiple enlarged lateral aortic recess lymph nodes. Precarinal lymph node on axial image 53 measuring 1.3 x 1.8 cm, stable compared to the prior exam. Stable bilateral symmetric mild enlarged hilar lymph nodes consistent with lymphadenopathy. Lungs/pleura: The endotracheal tube is in the trachea with the tip approximately 3 cm above the alejandro. The trachea is patent. The endobronchial tree is patent. No pneumothorax or effusion. Innumerable bilateral 0.2-0.4 cm solid pulmonary nodules, there is 1 in the lateral left upper lobe which demonstrates central lucency consistent with necrosis. Stable left lower lobe axial interstitial soft tissue thickening with a oval 2.0 x 2.6 cm mass on axial image 78 which is stable compared to the prior exam.  Progressive right lower lobe volume loss and peribronchial ground-glass opacity and consolidation. Progressive right lung base volume loss and peribronchial consolidation. No lobar consolidation. Abdomen: Streak artifact related to splenic vascular embolization coils result in significant beam streak artifact and limit evaluation of the left hemiabdomen. No intrahepatic mass or biliary dilatation.   Redemonstration of cholelithiasis with no wall thickening or adjacent inflammation. The common bile duct and pancreas demonstrate no acute abnormality. Splenic hilar and inferior pole focal atrophy and coarse calcifications which appear stable compared to the prior exam and may represent treated metastatic lesions. There is stable adjacent pleural, chest, and abdominal wall thickening and perisplenic fat induration. The adrenals and kidneys demonstrate no acute abnormality. Abdominal aortic atherosclerosis with no aneurysmal dilatation. Stomach is decompressed with mild mucosal fold thickening. The enteric tube is in the stomach with the tip extending to the mid gastric body. Small bowel and appendix are unremarkable. Left hemiabdomen subcutaneous medical infusion port with catheter entering the peritoneal cavity in the left paramedian anterior pelvis with a catheter extending to the posterosuperior pelvis. No ascites or free intraperitoneal air. The ascending colon demonstrates stable mild wall thickening without distention. The transverse and descending colon demonstrate moderate distention with liquid air stool levels and diffuse mild wall thickening which appears slightly improved compared to the prior exam.  No evidence of pneumatosis. There is abrupt transition with decompressed distal sigmoid colon to the level of the rectum. This appears similar to the prior exam. Left periaortic enlarged lymph nodes with associated coarse calcifications on axial image 91 a lymph node demonstrates interval increase in size measuring 2.2 x 2.3 cm compared to 1.6 x 1.8 cm. There is new adjacent fat induration. There are numerous other retroperitoneal left periaortic lymph nodes which appear stable in size. Pelvis: A Mercado catheter is within the bladder with mild contrast excreted into the bladder and no focal abnormality.   The rectum is normal.  Within the pelvis there is a irregular solid and cystic peritoneal mass identified on axial image 154 measuring approximately 2.8 x 4.7 cm stable compared to the prior exam with associated coarse calcifications. There are multiple colonic tethers. Superior to this there is a presacral solid and cystic mass with calcifications that measures 2.1 x 2.9 cm on axial image 148 which has increased in size previously measuring 1.4 x 3.0 cm. Multifocal peritoneal retroperitoneal reticulonodular solid nodules with associated coarse calcifications. Stable densely calcified and enlarged right external iliac chain lymph node. Musculoskeletal & Body Wall:  Enlarged right axillary lymph node measuring 1.1 x 1.5 cm on axial image 29 with associated coarse calcifications which has mildly increased in size. No significant left axillary lymphadenopathy. Interval improvement of body wall edema compared to the prior exam.  There is mild body wall edema. Left inguinal lymphadenopathy which appears stable compared to the prior exam. Multifocal lower thoracic and lumbar intramedullary sclerotic blastic lesions. Multifocal pelvic intramedullary sclerotic lesions. Sclerotic lesions appear stable compared to the prior exam.  No acute fracture. 1. Progressive bibasilar and left lower lobe consolidative change which may represent atelectasis versus superimposed pneumonia. 2. Redemonstration of thoracic and abdominal metastatic disease with mixed change. Most lesions appear stable with a few interval progression (right axillary metastatic lymphadenopathy. Retroperitoneal left periaortic metastatic lymphadenopathy, pelvic presacral cystic mass). 3. Redemonstration of peritoneal carcinomatosis. No significant ascites. 4. Stable blastic skeletal metastasis. 5. Moderate transverse and descending colonic distention with liquid stool. Abrupt transition in the mid sigmoid colon which may relate to malignant partial obstruction or stricture. Mild interval improvement of colonic edema. No evidence of colonic perforation or pneumatosis.      MRI BRAIN W WO CONTRAST    Result Date: 10/29/2022  EXAMINATION: MRI OF THE BRAIN WITHOUT AND WITH CONTRAST  10/29/2022 9:01 pm TECHNIQUE: Multiplanar multisequence MRI of the head/brain was performed without and with the administration of intravenous contrast. COMPARISON: 10/29/2022 HISTORY: ORDERING SYSTEM PROVIDED HISTORY: AMS TECHNOLOGIST PROVIDED HISTORY: AMS Reason for Exam: AMS FINDINGS: Motion artifact challenge evaluation degrades image quality. INTRACRANIAL STRUCTURES/VENTRICLES:  There is no acute infarct. Mild involutional change brain parenchyma identified prominent ventricles sulci. Mild periventricular subcortical T2 prolongation identified suggestive chronic small vessel disease. Evidence of remote encephalomalacia identified right occipital and posterior temporal lobe. Mild gliosis identified right frontal deep white matter. No mass effect or midline shift. No evidence of an acute intracranial hemorrhage. The ventricles and sulci are normal in size and configuration. The sellar/suprasellar regions appear unremarkable. The normal signal voids within the major intracranial vessels appear maintained. No abnormal focus of enhancement is seen within the brain. ORBITS: The visualized portion of the orbits demonstrate no acute abnormality. SINUSES: The visualized paranasal sinuses and mastoid air cells demonstrate no acute abnormality. BONES/SOFT TISSUES: The bone marrow signal intensity appears normal. The soft tissues demonstrate no acute abnormality. No acute stroke, midline shift mass effect. Chronic microvascular disease. Right sided posterior temporal and occipital lobe encephalomalacia.      MRI BRAIN W WO CONTRAST    Result Date: 10/9/2022  EXAMINATION: MRI OF THE BRAIN WITHOUT AND WITH CONTRAST  10/9/2022 9:35 am TECHNIQUE: Multiplanar multisequence MRI of the head/brain was performed without and with the administration of intravenous contrast. COMPARISON: Brain MRI done 10/04/2022 and 02/24/2022. HISTORY: ORDERING SYSTEM PROVIDED HISTORY: revaluation of parenchyma eveluate for PRES or other lesions TECHNOLOGIST PROVIDED HISTORY: revaluation of parenchyma eveluate for PRES or other lesions Reason for Exam: revaluation of parenchyma, PRES or other lesions FINDINGS: INTRACRANIAL STRUCTURES/VENTRICLES: Patchy cortical/subcortical FLAIR hyperintense signal in the right greater than left occipital lobes is similar to the brain MRI done October 4, 2022 and 02/24/2022. Small focal area of cortical/subcortical FLAIR hyperintense signal in the left posterior frontal lobe is similar compared to brain MRI done October 4, 2022 but new compared to brain MRI done 02/24/2022. These areas of FLAIR hyperintense signal demonstrate no correlating postcontrast enhancement, diffusion restriction or gradient blooming. There is no acute infarct. No mass effect or midline shift. No evidence of an acute intracranial hemorrhage. Generalized cerebral and cerebellar volume loss. No deonte hydrocephalus. The sellar/suprasellar regions appear unremarkable. The normal signal voids within the major intracranial vessels appear maintained. No abnormal focus of enhancement is seen within the brain. The axial FLAIR images demonstrate mild bilateral periventricular and deep white matter signal hyperintensities which are nonspecific but commonly seen in the setting of chronic small vessel ischemic disease. ORBITS: The visualized portion of the orbits demonstrate no acute abnormality. SINUSES: The visualized paranasal sinuses and mastoid air cells demonstrate no acute abnormality. BONES/SOFT TISSUES: The bone marrow signal intensity appears normal. The soft tissues demonstrate no acute abnormality.      1.  Overall, findings are similar compared to brain MRI done October 4, 2022 with redemonstration of patchy areas of FLAIR hyperintense signal in the left posterior frontal lobe and right greater than left occipital lobes which may represent small old infarcts. 2.  No acute stroke, intracranial hemorrhage or abnormal enhancement. 3.  Mild chronic small vessel ischemic disease. MRI BRAIN WO CONTRAST    Result Date: 10/4/2022  EXAMINATION: MRI OF THE BRAIN WITHOUT CONTRAST  10/4/2022 3:55 pm TECHNIQUE: Multiplanar multisequence MRI of the brain was performed without the administration of intravenous contrast. COMPARISON: CT head 1 day prior. MRI brain 02/24/2022 HISTORY: ORDERING SYSTEM PROVIDED HISTORY: encephalopathy, hx of CA, hx of prior PRES with subsequent seizure disorder TECHNOLOGIST PROVIDED HISTORY: encephalopathy, hx of CA, hx of prior PRES with subsequent seizure disorder Reason for Exam: Altered Mental Status - encephalopathy, hx of CA, hx of prior PRES with subsequent seizure disorder FINDINGS: Motion limited evaluation. INTRACRANIAL STRUCTURES/VENTRICLES: No diffusion restriction to suggest acute infarct. Subtle small focus of gas that subtle new small focus of cortical and subcortical T2/FLAIR hyperintensity involving the lateral left frontal lobe best seen on FLAIR image 21. Stable small foci of right occipital encephalomalacia with mild surrounding gliosis. No mass effect or midline shift. No evidence of an acute intracranial hemorrhage. The ventricles and sulci are normal in size and configuration. The sellar/suprasellar regions appear unremarkable. The normal signal voids within the major intracranial vessels appear maintained. ORBITS: The visualized portion of the orbits demonstrate no acute abnormality. SINUSES: The visualized paranasal sinuses and mastoid air cells demonstrate no acute abnormality. BONES/SOFT TISSUES: The bone marrow signal intensity appears normal. The soft tissues demonstrate no acute abnormality.      1. New subtle focus of T2/FLAIR hyperintensity in the lateral left frontal cortex and subcortical white matter is nonspecific and may reflect sequela of prior ischemia or mild posterior reversal encephalopathy syndrome. Short interval follow-up is recommended. 2. No evidence of acute infarct or intracranial hemorrhage. 3. Stable small foci of right occipital encephalomalacia in keeping with sequela of prior insult. DIAGNOSIS:  MDD recurrent severe  Seizure disorder  Cerebrovascular accident  Delirium due to medical condition        RISK ASSESSMENT: low risk of suicide or harm to others          RECOMMENDATIONS  Disposition: No indication for admission to psychiatry  Risk Management:  routine:  no special precautions necessary    Medications: We will switch back from olanzapine to Seroquel. Olanzapine has been decreased to 5 mg nightly and Seroquel 50 mg nightly has been ordered  We will follow  Discussed with the treating physician/ team about the patient and treatment plan  Reviewed the chart    Discussed with the patient risk, benefit, alternative and common side effects for the  proposed medication treatment. Patient is consenting to the treatment. Thanks for the consult. Please call me if needed. Electronically signed by Bharti Yang MD on 10/30/2022 at 3:38 PM    Please note that this chart was generated using voice recognition Dragon dictation software. Although every effort was made to ensure the accuracy of this automated transcription, some errors in transcription may have occurred.

## 2022-10-31 LAB
ABSOLUTE EOS #: 0.3 K/UL (ref 0–0.4)
ABSOLUTE LYMPH #: 1.1 K/UL (ref 1–4.8)
ABSOLUTE MONO #: 0.6 K/UL (ref 0.1–1.3)
ANION GAP SERPL CALCULATED.3IONS-SCNC: 10 MMOL/L (ref 9–17)
BASOPHILS # BLD: 1 % (ref 0–2)
BASOPHILS ABSOLUTE: 0 K/UL (ref 0–0.2)
BUN BLDV-MCNC: 9 MG/DL (ref 6–20)
CALCIUM SERPL-MCNC: 8.6 MG/DL (ref 8.6–10.4)
CHLORIDE BLD-SCNC: 105 MMOL/L (ref 98–107)
CO2: 26 MMOL/L (ref 20–31)
CREAT SERPL-MCNC: 0.58 MG/DL (ref 0.5–0.9)
EGFR, POC: >60 ML/MIN/1.73M2
EOSINOPHILS RELATIVE PERCENT: 6 % (ref 0–4)
GFR SERPL CREATININE-BSD FRML MDRD: >60 ML/MIN/1.73M2
GLUCOSE BLD-MCNC: 103 MG/DL (ref 65–105)
GLUCOSE BLD-MCNC: 105 MG/DL (ref 65–105)
GLUCOSE BLD-MCNC: 86 MG/DL (ref 65–105)
GLUCOSE BLD-MCNC: 96 MG/DL (ref 65–105)
GLUCOSE BLD-MCNC: 97 MG/DL (ref 65–105)
GLUCOSE BLD-MCNC: 98 MG/DL (ref 70–99)
HCT VFR BLD CALC: 37.5 % (ref 36–46)
HEMOGLOBIN: 11.4 G/DL (ref 12–16)
LYMPHOCYTES # BLD: 21 % (ref 24–44)
MAGNESIUM: 1.7 MG/DL (ref 1.6–2.6)
MCH RBC QN AUTO: 30.2 PG (ref 26–34)
MCHC RBC AUTO-ENTMCNC: 30.4 G/DL (ref 31–37)
MCV RBC AUTO: 99.1 FL (ref 80–100)
MONOCYTES # BLD: 12 % (ref 1–7)
PDW BLD-RTO: 17.3 % (ref 11.5–14.9)
PLATELET # BLD: 248 K/UL (ref 150–450)
PMV BLD AUTO: 7 FL (ref 6–12)
POC CREATININE: 0.61 MG/DL (ref 0.51–1.19)
POTASSIUM SERPL-SCNC: 3.3 MMOL/L (ref 3.7–5.3)
RBC # BLD: 3.78 M/UL (ref 4–5.2)
SEG NEUTROPHILS: 60 % (ref 36–66)
SEGMENTED NEUTROPHILS ABSOLUTE COUNT: 3.2 K/UL (ref 1.3–9.1)
SODIUM BLD-SCNC: 141 MMOL/L (ref 135–144)
WBC # BLD: 5.2 K/UL (ref 3.5–11)

## 2022-10-31 PROCEDURE — 99232 SBSQ HOSP IP/OBS MODERATE 35: CPT | Performed by: PSYCHIATRY & NEUROLOGY

## 2022-10-31 PROCEDURE — 6370000000 HC RX 637 (ALT 250 FOR IP)

## 2022-10-31 PROCEDURE — 2580000003 HC RX 258

## 2022-10-31 PROCEDURE — 97166 OT EVAL MOD COMPLEX 45 MIN: CPT

## 2022-10-31 PROCEDURE — 85025 COMPLETE CBC W/AUTO DIFF WBC: CPT

## 2022-10-31 PROCEDURE — 80048 BASIC METABOLIC PNL TOTAL CA: CPT

## 2022-10-31 PROCEDURE — 6370000000 HC RX 637 (ALT 250 FOR IP): Performed by: INTERNAL MEDICINE

## 2022-10-31 PROCEDURE — 83735 ASSAY OF MAGNESIUM: CPT

## 2022-10-31 PROCEDURE — 36415 COLL VENOUS BLD VENIPUNCTURE: CPT

## 2022-10-31 PROCEDURE — 2060000000 HC ICU INTERMEDIATE R&B

## 2022-10-31 PROCEDURE — 99232 SBSQ HOSP IP/OBS MODERATE 35: CPT | Performed by: INTERNAL MEDICINE

## 2022-10-31 PROCEDURE — 82947 ASSAY GLUCOSE BLOOD QUANT: CPT

## 2022-10-31 PROCEDURE — 6370000000 HC RX 637 (ALT 250 FOR IP): Performed by: PSYCHIATRY & NEUROLOGY

## 2022-10-31 RX ORDER — QUETIAPINE FUMARATE 100 MG/1
100 TABLET, FILM COATED ORAL NIGHTLY
Status: DISCONTINUED | OUTPATIENT
Start: 2022-10-31 | End: 2022-11-01 | Stop reason: HOSPADM

## 2022-10-31 RX ORDER — LIDOCAINE 4 G/G
1 PATCH TOPICAL DAILY
Status: DISCONTINUED | OUTPATIENT
Start: 2022-10-31 | End: 2022-11-01 | Stop reason: HOSPADM

## 2022-10-31 RX ORDER — POTASSIUM CHLORIDE 20 MEQ/1
40 TABLET, EXTENDED RELEASE ORAL ONCE
Status: COMPLETED | OUTPATIENT
Start: 2022-10-31 | End: 2022-10-31

## 2022-10-31 RX ADMIN — AMLODIPINE BESYLATE 10 MG: 10 TABLET ORAL at 08:53

## 2022-10-31 RX ADMIN — POTASSIUM CHLORIDE 40 MEQ: 1500 TABLET, EXTENDED RELEASE ORAL at 08:52

## 2022-10-31 RX ADMIN — LACOSAMIDE 150 MG: 150 TABLET, FILM COATED ORAL at 10:31

## 2022-10-31 RX ADMIN — LOPERAMIDE HYDROCHLORIDE 2 MG: 2 CAPSULE ORAL at 22:22

## 2022-10-31 RX ADMIN — Medication 1 CAPSULE: at 22:23

## 2022-10-31 RX ADMIN — APIXABAN 5 MG: 5 TABLET, FILM COATED ORAL at 08:53

## 2022-10-31 RX ADMIN — SENNOSIDES 8.6 MG: 8.6 TABLET, FILM COATED ORAL at 08:54

## 2022-10-31 RX ADMIN — PANTOPRAZOLE SODIUM 40 MG: 40 TABLET, DELAYED RELEASE ORAL at 08:53

## 2022-10-31 RX ADMIN — SODIUM CHLORIDE, PRESERVATIVE FREE 10 ML: 5 INJECTION INTRAVENOUS at 22:26

## 2022-10-31 RX ADMIN — ANTI-FUNGAL POWDER MICONAZOLE NITRATE TALC FREE: 1.42 POWDER TOPICAL at 22:23

## 2022-10-31 RX ADMIN — APIXABAN 5 MG: 5 TABLET, FILM COATED ORAL at 22:23

## 2022-10-31 RX ADMIN — QUETIAPINE FUMARATE 100 MG: 100 TABLET ORAL at 22:23

## 2022-10-31 RX ADMIN — LEVETIRACETAM 750 MG: 750 TABLET, FILM COATED ORAL at 22:22

## 2022-10-31 RX ADMIN — LACOSAMIDE 150 MG: 150 TABLET, FILM COATED ORAL at 22:23

## 2022-10-31 RX ADMIN — LAMOTRIGINE 125 MG: 100 TABLET ORAL at 08:57

## 2022-10-31 RX ADMIN — ONDANSETRON 4 MG: 4 TABLET, ORALLY DISINTEGRATING ORAL at 23:11

## 2022-10-31 RX ADMIN — Medication 6 MG: at 22:22

## 2022-10-31 RX ADMIN — LAMOTRIGINE 125 MG: 100 TABLET ORAL at 22:26

## 2022-10-31 RX ADMIN — LEVETIRACETAM 750 MG: 750 TABLET, FILM COATED ORAL at 08:53

## 2022-10-31 RX ADMIN — SODIUM CHLORIDE, PRESERVATIVE FREE 10 ML: 5 INJECTION INTRAVENOUS at 09:06

## 2022-10-31 RX ADMIN — Medication 1 CAPSULE: at 08:53

## 2022-10-31 ASSESSMENT — PAIN SCALES - GENERAL: PAINLEVEL_OUTOF10: 8

## 2022-10-31 ASSESSMENT — ENCOUNTER SYMPTOMS
WHEEZING: 0
EYE REDNESS: 0
NAUSEA: 0
BACK PAIN: 0
COUGH: 0
PHOTOPHOBIA: 0
SORE THROAT: 0
SHORTNESS OF BREATH: 0
BLOOD IN STOOL: 0
RHINORRHEA: 0
VOMITING: 0
DIARRHEA: 0
CONSTIPATION: 0
EYE DISCHARGE: 0
ABDOMINAL PAIN: 0

## 2022-10-31 ASSESSMENT — PAIN DESCRIPTION - FREQUENCY: FREQUENCY: CONTINUOUS

## 2022-10-31 ASSESSMENT — PAIN DESCRIPTION - DESCRIPTORS: DESCRIPTORS: ACHING

## 2022-10-31 ASSESSMENT — PAIN DESCRIPTION - LOCATION: LOCATION: BACK

## 2022-10-31 ASSESSMENT — PAIN DESCRIPTION - ORIENTATION: ORIENTATION: LOWER

## 2022-10-31 ASSESSMENT — PAIN DESCRIPTION - ONSET: ONSET: ON-GOING

## 2022-10-31 NOTE — PROGRESS NOTES
[place] [facility] [address] NOTE     10/31/2022     Patient was seen and examined in person, Chart reviewed   Patient's case discussed with staff/team    Chief Complaint: Aphasia    Interim History:     Patient seen at bedside. Comfortably sitting in bed and denies any new complaints. States she feels her speech is significantly better than yesterday, states she feels 50% improved overall. States she slept well and has good appetite. Denies any delusions, SI or HI. Does endorse auditory hallucinations which she describes as hearing repeated echoes of statements people have said, \" if you went in the hallway and said hello I would keep hearing hello hello hello repeatedly\". Speech appears significantly improved compared to earlier, no aphasia present at this time. BP (!) 149/80   Pulse 84   Temp 97.4 °F (36.3 °C) (Axillary)   Resp 16   Wt 134 lb 11.2 oz (61.1 kg)   SpO2 95%   BMI 22.42 kg/m²   Appetite:  [x] Normal/Unchanged  [] Increased  [] Decreased      Sleep:       [x] Normal/Unchanged  [] Fair       [] Poor              Energy:    [x] Normal/Unchanged  [] Increased  [] Decreased        Aggression:  [] yes  [x] no    Patient is [] able  [] unable to CONTRACT FOR SAFETY ON THE UNIT    PAST MEDICAL/PSYCHIATRIC HISTORY:   Past Medical History:   Diagnosis Date    Anemia     Bleeding 10/2020    intra-abdominal bleeding -due to splenic mass with GI infiltration. Status post embolization    Cervical cancer (HCC)     Depression     Diabetes mellitus ([address]     GERD (gastroesophageal reflux disease)     Hx of blood clots     Hypertension     Metastatic cancer ([address] 10/2020    extensive intraabdominal and splenic involvement and lung mets.     Ovarian cancer ([address]     low grade serous ovarian carcinoma    Post chemo evaluation     2007: Chemo via med onc (Dr. Ana Neff), 2008: Deanna Brought due to rising CA-125, 2013: intraperitoneal chemo,12/2015: Ca125 - 25     PRES (posterior reversible encephalopathy syndrome) Splenic lesion        FAMILY/SOCIAL HISTORY:  Family History   Problem Relation Age of Onset    Alcohol Abuse Mother     Cirrhosis Mother      Social History     Socioeconomic History    Marital status: Single     Spouse name: Not on file    Number of children: Not on file    Years of education: Not on file    Highest education level: Not on file   Occupational History    Not on file   Tobacco Use    Smoking status: Every Day     Packs/day: 1.00     Years: 20.00     Pack years: 20.00     Types: Cigarettes    Smokeless tobacco: Never   Vaping Use    Vaping Use: Never used   Substance and Sexual Activity    Alcohol use: Not Currently    Drug use: Never    Sexual activity: Not on file   Other Topics Concern    Not on file   Social History Narrative    Not on file     Social Determinants of Health     Financial Resource Strain: Medium Risk    Difficulty of Paying Living Expenses: Somewhat hard   Food Insecurity: Food Insecurity Present    Worried About Running Out of Food in the Last Year: Often true    Ran Out of Food in the Last Year: Sometimes true   Transportation Needs: Not on file   Physical Activity: Not on file   Stress: Not on file   Social Connections: Not on file   Intimate Partner Violence: Not on file   Housing Stability: Not on file           ROS:  [x] All negative/unchanged except if checked.  Explain positive(checked items) below:  [] Constitutional  [] Eyes  [] Ear/Nose/Mouth/Throat  [] Respiratory  [] CV  [] GI  []   [] Musculoskeletal  [] Skin/Breast  [] Neurological  [] Endocrine  [] Heme/Lymph  [] Allergic/Immunologic    Explanation:     MEDICATIONS:    Current Facility-Administered Medications:     LORazepam (ATIVAN) injection 2 mg, 2 mg, IntraVENous, Q5 Min PRN, Delonte Sullivan DO    OLANZapine (ZYPREXA) tablet 5 mg, 5 mg, Oral, Nightly, Omar Whitney MD, 5 mg at 10/30/22 2021    QUEtiapine (SEROQUEL) tablet 50 mg, 50 mg, Oral, Nightly, Omar Whitney MD, 50 mg at 10/30/22 2021    sodium chloride flush 0.9 % injection 10 mL, 10 mL, IntraVENous, BID, Edgar Reyez MD, 10 mL at 10/31/22 0906    amLODIPine (NORVASC) tablet 10 mg, 10 mg, Oral, Daily, Edgar Reyez MD, 10 mg at 10/31/22 0853    apixaban (ELIQUIS) tablet 5 mg, 5 mg, Oral, BID, Edgar Reyez MD, 5 mg at 10/31/22 0853    benzonatate (TESSALON) capsule 100 mg, 100 mg, Oral, TID PRN, Edgar Reyez MD, 100 mg at 10/29/22 2239    lacosamide (VIMPAT) tablet 150 mg, 150 mg, Oral, BID, Edgar Reyez MD, 150 mg at 10/30/22 2021    lactobacillus (CULTURELLE) capsule 1 capsule, 1 capsule, Oral, BID, Edgar Reyez MD, 1 capsule at 10/31/22 0853    lamoTRIgine (LAMICTAL) tablet 125 mg, 125 mg, Oral, BID, Edgar Reyez MD, 125 mg at 10/31/22 0857    levETIRAcetam (KEPPRA) tablet 750 mg, 750 mg, Oral, BID, Edgar Reyez MD, 750 mg at 10/31/22 0853    loperamide (IMODIUM) capsule 2 mg, 2 mg, Oral, 4x Daily PRN, Edgar Reyez MD    melatonin tablet 6 mg, 6 mg, Oral, Nightly, Edgar Reyez MD, 6 mg at 10/30/22 2021    miconazole (MICOTIN) 2 % powder, , Topical, BID, Edgar Reyez MD, Given at 10/30/22 2028    pantoprazole (PROTONIX) tablet 40 mg, 40 mg, Oral, Daily, Edgar Reyez MD, 40 mg at 10/31/22 0853    senna (SENOKOT) tablet 8.6 mg, 1 tablet, Oral, BID, Edgar Reyez MD, 8.6 mg at 10/31/22 0854    sennosides-docusate sodium (SENOKOT-S) 8.6-50 MG tablet 2 tablet, 2 tablet, Oral, Nightly, Edgar Reyez MD, 2 tablet at 10/30/22 2024    sodium chloride flush 0.9 % injection 5-40 mL, 5-40 mL, IntraVENous, 2 times per day, Edgar Reyez MD, 10 mL at 10/30/22 2025    sodium chloride flush 0.9 % injection 5-40 mL, 5-40 mL, IntraVENous, PRN, Edgar Reyez MD    0.9 % sodium chloride infusion, , IntraVENous, PRN, Edgar Reyez MD    ondansetron (ZOFRAN-ODT) disintegrating tablet 4 mg, 4 mg, Oral, Q8H PRN, 4 mg at 10/30/22 2020 **OR** ondansetron (ZOFRAN) injection 4 mg, 4 mg, IntraVENous, Q6H PRN, Anthony Mariano MD    polyethylene glycol (GLYCOLAX) packet 17 g, 17 g, Oral, Daily PRN, Anthony Mariano MD    acetaminophen (TYLENOL) tablet 650 mg, 650 mg, Oral, Q6H PRN **OR** acetaminophen (TYLENOL) suppository 650 mg, 650 mg, Rectal, Q6H PRN, Shante Gonzalez MD    insulin lispro (HUMALOG) injection vial 0-4 Units, 0-4 Units, SubCUTAneous, TID WC, Shante Gonzalez MD    insulin lispro (HUMALOG) injection vial 0-4 Units, 0-4 Units, SubCUTAneous, Nightly, Shante Gonzalez MD    glucose chewable tablet 16 g, 4 tablet, Oral, PRN, Anthony Mariano MD    dextrose bolus 10% 125 mL, 125 mL, IntraVENous, PRN **OR** dextrose bolus 10% 250 mL, 250 mL, IntraVENous, PRN, Shante Gonzalez MD    glucagon (rDNA) injection 1 mg, 1 mg, SubCUTAneous, PRN, Anthony Mariano MD    dextrose 10 % infusion, , IntraVENous, Continuous PRN, Shante Gonzalez MD    sodium chloride flush 0.9 % injection 10 mL, 10 mL, IntraVENous, PRN, Padilla Siddiqui MD, 10 mL at 10/29/22 2110      Examination:  BP (!) 149/80   Pulse 84   Temp 97.4 °F (36.3 °C) (Axillary)   Resp 16   Wt 134 lb 11.2 oz (61.1 kg)   SpO2 95%   BMI 22.42 kg/m²   Gait -deferred  Medication side effects(SE): Denies    Mental Status Examination:    Level of consciousness:  within normal limits   Appearance:  good grooming and good hygiene  Behavior/Motor:  no abnormalities noted  Attitude toward examiner:  cooperative and attentive  Speech:  normal rate and normal volume   Mood: within normal limits  Affect:  mood congruent  Thought processes:  linear, goal directed, and coherent   Thought content:  Homicidal ideation - none  Suicidal Ideation:  denies suicidal ideation  Delusions:  no evidence of delusions  Perceptual Disturbance:  auditory-describing auditory persistence. States she keeps hearing echoes of stuff people have said.   Cognition:  oriented to person, place, and time   Concentration intact  Insight good   Judgement good     ASSESSMENT:   Patient symptoms are:  [] Well controlled  [x] Improving  [] Worsening  [] No change      Diagnosis:   Principal Problem:    Cerebrovascular accident (CVA) ([facility]  Active Problems:    Cerebrovascular accident (CVA) associated with severe acute respiratory syndrome coronavirus 2 (SARS-CoV-2) infection (HCC)    Difficulty with speech    MDD (major depressive disorder), recurrent severe, without psychosis ([facility]    Epithelial ovarian cancer, FIGO stage OSIRIS (HCC)    Anemia    Seizure disorder, status epilepticus, nonconvulsive (HCC)    History of ovarian cancer    Cancer, metastatic to bone ([facility]    History of deep venous thrombosis (DVT) of distal vein of left lower extremity: On Eliquis    Diabetes mellitus ([facility]    Gastroesophageal reflux disease    Delirium due to another medical condition    Seizure disorder [facility])  Resolved Problems:    Seizure ([facility]      LABS:    Recent Labs     10/29/22  1455 10/30/22  0456 10/31/22  0456   WBC 10.2 6.2 5.2   HGB 12.2 11.6* 11.4*    257 248     Recent Labs     10/29/22  1648 10/30/22  0456 10/31/22  0456    141 141   K 3.1* 3.6* 3.3*    105 105   CO2 27 25 26   BUN 9 13 9   CREATININE 0.64 0.68 0.58   GLUCOSE 97 82 98     Recent Labs     10/29/22  1648   BILITOT 0.3   ALKPHOS 84   AST 15   ALT 6     Lab Results   Component Value Date/Time    BARBSCNU NEGATIVE 02/21/2022 05:58 AM    LABBENZ NEGATIVE 02/21/2022 05:58 AM    LABMETH NEGATIVE 02/21/2022 05:58 AM    PPXUR NOT REPORTED 02/21/2022 05:58 AM     Lab Results   Component Value Date/Time    TSH 2.93 03/09/2021 05:43 PM     No results found for: LITHIUM  No results found for: VALPROATE, CBMZ    RISK ASSESSMENT: Low risk for self-harm    Treatment Plan:  Reviewed current Medications with the patient. We will transition patient off Zyprexa and back to Seroquel. Continue Zyprexa 5 mg nightly and Seroquel 50 mg nightly as ordered.     Risks, benefits, side effects, drug-to-drug interactions and alternatives to treatment were discussed. The patient consented to treatment. PSYCHOTHERAPY/COUNSELING:  [] Therapeutic interview  [x] Supportive  [] CBT  [] Ongoing  [] Other          Farrukh Greenfield is a 59 y.o. female being evaluated face to face. --Halima Garber MD on 10/31/2022 at 10:04 AM    An electronic signature was used to authenticate this note. **This report has been created using voice recognition software. It may contain minor errors which are inherent in voice recognition technology. **  I independently saw and evaluated the patient. I reviewed the  documentation above. Any additional comments or changes to the    documentation are stated below otherwise agree with assessment.      -The patient was seen at bedside. She is labile in mood. She is speaking more clearly today. She was also able to answer cognition related questions more promptly today. She continues to have difficulty with orientation and time. She does recall that we are cross tapering her medication        PLAN  Olanzapine DC'd. Seroquel increased 100 mg at nighttime. This will likely need to be increased further. We will monitor  Attempt to develop insight   Supportive Therapy conducted.   Follow-up daily while on inpatient unit    Electronically signed by Debbi Cruz MD on 10/31/22 at 7:30 PM EDT

## 2022-10-31 NOTE — PROGRESS NOTES
Pt c/o \"I just had an EEG at [address] [facility]. They rip off those probes in my hair and it hurts. Do I really have to get this done again? ... Did you ever get my medication? \" Dr Latrice Painter aware of pt complaint and writer wondering if it can be cancelled. Will look at chart and decide soon, \"give me a minute. \" Transport cancelled until decision.

## 2022-10-31 NOTE — PROGRESS NOTES
[address]   Occupational Therapy Evaluation  Date: 10/31/22  Patient Name: Emi Rojas [facility]       Room: 9867/9575-99  MRN: [id number]  Account: [de-identified]   : 1965  (59 y.o.) Gender: female     Discharge Recommendations:  Further Occupational Therapy is recommended upon facility discharge. OT Equipment Recommendations  Other: TBD    Referring Practitioner: Mp Teixeira MD  Diagnosis: Acute CVA      Treatment Diagnosis: impaired self care status    Past Medical History:  has a past medical history of Anemia, Bleeding, Cervical cancer ([address] Depression, Diabetes mellitus ([address] GERD (gastroesophageal reflux disease), Hx of blood clots, Hypertension, Metastatic cancer ([facility] Ovarian cancer ([facility] Post chemo evaluation, PRES (posterior reversible encephalopathy syndrome), and Splenic lesion. Past Surgical History:   has a past surgical history that includes Hysterectomy, total abdominal; Port Surgery; Tonsillectomy; IR PORT PLACEMENT > 5 YEARS (2020); Anus surgery; Abscess Drainage (); colectomy (2013); IR EMBOLIZATION HEMORRHAGE (10/05/2020); and Cardiac catheterization.     Restrictions  Restrictions/Precautions  Restrictions/Precautions: Fall Risk;Seizure  Required Braces or Orthoses?: No  Implants present? :  (Pt denies)      Vitals  Vitals  Heart Rate: 88  Heart Rate Source: Monitor  BP: 128/67  BP Method: Automatic  Patient Position: Semi fowlers  MAP (Calculated): 87.33  Resp: 16  SpO2: 99 %  O2 Device: Nasal cannula     Subjective  Subjective: Pt resting in bed upon arrival. Pt was pleasant and agreeable to OT eval  Comments: Ok per [facility] for OT eval  Subjective  Pain: Pt reports mid back pain 8/10      Social/Functional History  Social/Functional History  Lives With: Other (comment) (ex)  Type of Home: House  Home Layout: Two level, Able to Live on Main level with bedroom/bathroom, Laundry in basement  Home Access: Stairs to enter without rails  Entrance Stairs - Number of Steps: 4-5 MARCIAL  Bathroom Shower/Tub: Tub/Shower unit, Curtain, Shower chair with back  [employer]: Standard  Bathroom Equipment: Grab bars in shower, Hand-held shower  Bathroom Accessibility: Walker accessible  Home Equipment: Rollator, Reacher  Has the patient had two or more falls in the past year or any fall with injury in the past year?: No  ADL Assistance: [address] [place] [address] Avenue: Independent  Ambulation Assistance: Independent (with 4PT)  Transfer Assistance: Independent  Active : No  Patient's  Info: Transportation through cancer assiciation or family  IADL Comments: Pt reports sleeping in flat bed  Additional Comments: Pt reports having PT/OT at SNF which is where she came from. quetionable historian with no family present. Objective  Cognition  Orientation  Overall Orientation Status: Within Functional Limits  Orientation Level: Oriented to person, Oriented to time, Oriented to place, Oriented to situation  Cognition  Overall Cognitive Status: Exceptions  Safety Judgement: Decreased awareness of need for safety  Problem Solving: Assistance required to identify errors made, Assistance required to correct errors made  Insights: Decreased awareness of deficits   Sensation  Overall Sensation Status: Impaired (Bilateral feet)    Activities of Daily Living  ADL  Feeding: Setup  Grooming: Setup  UE Bathing: Stand by assistance  LE Bathing: Contact guard assistance  UE Dressing: Stand by assistance  LE Dressing: Contact guard assistance  Toileting: Contact guard assistance  Additional Comments: ADL scores based on clinical reasoning and skilled observation unless otherwise noted.  Pt currently limited due to decreased strength, balance, activity tolerance, and safety awareness impacting safety and independence with self care tasks         UE Function  LUE AROM (degrees)  LUE AROM : WFL  Left Hand AROM (degrees)  Left Hand AROM: WFL  Tone LUE  LUE Tone: Normotonic  LUE Strength  Gross LUE Strength: WFL  L Hand General: 4/5    RUE AROM (degrees)  RUE AROM : WFL  Right Hand AROM (degrees)  Right Hand AROM: WFL  Tone RUE  RUE Tone: Normotonic  RUE Strength  Gross RUE Strength: WFL  R Hand General: 4/5         Fine Motor Skills/Coordination  Coordination  Movements Are Fluid And Coordinated: Yes                Mobility  Bed Mobility  Bed mobility  Supine to Sit: Stand by assistance  Sit to Supine: Stand by assistance  Scooting: Stand by assistance  Bed Mobility Comments: bed mobility completed with HOB elevated    Balance  Balance  Sitting Balance: Stand by assistance  Standing Balance: Contact guard assistance       Transfers  Transfers  Sit to stand: Contact guard assistance  Stand to sit: Contact guard assistance  Transfer Comments: Verbal cues for hand placement and safety with Poor carryover. Pt demonstrated impulsive behavior with transfers    Functional Mobility  Functional - Mobility Device: Rolling Walker  Activity: Other (to/from door)  Assist Level: Contact guard assistance  Functional Mobility Comments: Verbal cues for safety with Fair carryover. Pt demonstrated impulsive behavior    Assessment  Assessment  Performance deficits / Impairments: Decreased ADL status, Decreased functional mobility , Decreased safe awareness, Decreased endurance, Decreased balance, Decreased high-level IADLs  Treatment Diagnosis: impaired self care status  Prognosis: Good  Decision Making: Medium Complexity  Discharge Recommendations: Patient would benefit from continued therapy after discharge    Activity Tolerance  Activity Tolerance: Patient Tolerated treatment well    Safety Devices  Type of Devices:  All fall risk precautions in place, Call light within reach, Gait belt, Patient at risk for falls, Left in bed, Nurse notified, Bed alarm in place    Patient Education  Patient Education  Education Given To: Patient  Education Provided: Role of Therapy, Plan of Care, Transfer Training  Education Method: Verbal  Education Outcome: Continued education needed      Functional Outcome Measures  AM-PAC Daily Activity Inpatient   How much help for putting on and taking off regular lower body clothing?: A Little  How much help for Bathing?: A Little  How much help for Toileting?: A Little  How much help for putting on and taking off regular upper body clothing?: A Little  How much help for taking care of personal grooming?: A Little  How much help for eating meals?: A Little  AM-[facility] Inpatient Daily Activity Raw Score: 18  AM-PAC Inpatient ADL T-Scale Score : 38.66  ADL Inpatient CMS 0-100% Score: 46.65  ADL Inpatient CMS G-Code Modifier : CK       Goals     Short Term Goals  Time Frame for Short Term Goals: By discharge  Short Term Goal 1: pt will complete BADLs with modified independence and Good safety with use of AE as needed  Short Term Goal 2: Pt will complete functional transfers/mobility during self care tasks with modified independence and Good safety with use of least restrictive device  Short Term Goal 3: Pt will tolerate standing 5+ minutes during functional activity of choice with Good safety while maintaining SpO2 above 90%  Short Term Goal 4: Pt will verbalize/demonstrate Good understanding of fall prevention/energy conservation strategies to increase safety and independence with self care and mobility  Short Term Goal 5: Pt will participate in 15+ minutes of therapeutic exercises/functional activities to increase safety and independence with self care and mobility    Plan  Occupational Therapy Plan  Times Per Week: 3-5  Current Treatment Recommendations: Self-Care / ADL, Strengthening, Balance training, Functional mobility training, Endurance training, Safety education & training, Patient/Caregiver education & training, Equipment evaluation, education, & procurement, Home management training, Cognitive/Perceptual training      OT Individual Minutes  OT Individual Minutes  Time In: 0930  Time Out: 1686  Minutes: 15           Electronically signed by Federico Weir OT on 10/31/22 at 2:37 PM EDT

## 2022-10-31 NOTE — PROGRESS NOTES
[address] [facility] Internal Medicine  Aime Raya MD; Khloe Laird MD; Amado Rojo MD; MD Severiano Caceres MD; Julienne Soulier, MD [facility] Internal Medicine   [facility]    HISTORY AND PHYSICAL EXAMINATION            Date:   10/31/2022  Patient name:  Eyad Haile  Date of admission:  10/29/2022  2:36 PM  MRN:   [id number]  Account:  [de-identified]  YOB: 1965  PCP:    Marley Serrano DO  Room:   2085/2085-01  Code Status:    Full Code    Chief Complaint:     Chief Complaint   Patient presents with    Altered Mental Status       History Obtained From:     Patient and electronic medical record    History of Present Illness:     Eyad Haile is a 59 y.o. Non- / non  female who presents with Altered Mental Status   and is admitted to the hospital for the management of Cerebrovascular accident (CVA) ([facility] [address] Patient comes in with difficulty talking. Family is at bedside who brought her in as she has been unable to communicate. Daughter stated that her speech is completely garbled and is difficult to understand. Patient is able to understand everybody but is having a hard time communicating what she wants to say. Patient is not oriented to place or person or time. Denies numbness or tingling. Denies dizziness or lightheadedness. Denies pain, fever, chills, sob, urinary or bowel complaints. She has not been recently ill but was admitted in the hospital 3 weeks ago for seizures and pneumonia. Past medical history is significant for ovarian cancer with metastasis, anemia, seizures, DM, GERD, DVT and HTN. On arrival to the ER tele stroke evaluation was done and is negative. She is afebrile and hemodynamically stable. Labs reviewed and she has hypokalemia. Patient is scheduled for MRI wo contrast of brain.   10/31  Patient, clinically doing better  MRI negative  Eval by neurology and psychiatry    Past Medical History:     Past Medical History:   Diagnosis Date    Anemia     Bleeding 10/2020    intra-abdominal bleeding -due to splenic mass with GI infiltration. Status post embolization    Cervical cancer ([facility] [address]     Depression     Diabetes mellitus ([facility] [address]     GERD (gastroesophageal reflux disease)     Hx of blood clots     Hypertension     Metastatic cancer ([facility] [address] 10/2020    extensive intraabdominal and splenic involvement and lung mets.  Ovarian cancer ([facility] [address]     low grade serous ovarian carcinoma    Post chemo evaluation     2007: Chemo via med onc (Dr. Steve Herrera), 2008: Dalila Micheal due to rising CA-125, 2013: intraperitoneal chemo,12/2015: Ca125 - 25     PRES (posterior reversible encephalopathy syndrome)     Splenic lesion         Past Surgical History:     Past Surgical History:   Procedure Laterality Date    ABSCESS DRAINAGE  2013    Franca rectal    ANUS SURGERY      ANAL FISSURECTOMY    CARDIAC CATHETERIZATION      COLECTOMY  03/2013    ex lap, tumor debulking, transverse colectomy w reanastamosis, subgastric omentectomy, intraperitoneal port placement    HYSTERECTOMY, TOTAL ABDOMINAL (CERVIX REMOVED)      IR EMBOLIZATION HEMORRHAGE  10/05/2020    intra-abdominal bleeding -due to splenic mass with GI infiltration. Status post embolization [employer] interlock coils x7. mri condtional 3t ok, safe immediately post implant.  IR PORT PLACEMENT EQUAL OR GREATER THAN 5 YEARS  08/24/2020    IR PORT PLACEMENT EQUAL OR GREATER THAN 5 YEARS 8/24/2020 Arias Waller MD [facility]    PORT SURGERY      IP Port    TONSILLECTOMY          Medications Prior to Admission:     Prior to Admission medications    Medication Sig Start Date End Date Taking? Authorizing Provider   lacosamide (VIMPAT) 150 MG TABS tablet Take 1 tablet by mouth 2 times daily.  10/14/22 10/15/25  Tha Porras APRN - CNP   levETIRAcetam (KEPPRA) 750 MG tablet Take 1 tablet by mouth 2 times daily  Patient taking differently: Take 750 mg by mouth 2 times daily Might be taking a different dose 10/14/22   Tha Porras, APRN - CNP   OLANZapine (ZYPREXA) 5 MG tablet Take 1 tablet by mouth nightly  Patient taking differently: Take 10 mg by mouth nightly 10/13/22   Joycie Brittle, MD   amLODIPine (NORVASC) 10 MG tablet Take 1 tablet by mouth daily 10/11/22   José Luis Alvarado MD   metoprolol tartrate (LOPRESSOR) 25 MG tablet Take 1 tablet by mouth 2 times daily 10/10/22   José Luis Alvarado MD   miconazole (ZEASORB-AF) 2 % powder Apply topically 2 times daily. 10/10/22   José Dee MD   STIMULANT LAXATIVE 8.6-50 MG per tablet TAKE 2 TABLETS BY MOUTH NIGHTLY.  10/6/22   Shanda Hinds DO   lamoTRIgine (LAMICTAL) 25 MG tablet Take 5 tablets by mouth 2 times daily 9/22/22   Shanda Hinds DO   sertraline (ZOLOFT) 100 MG tablet TAKE 1 TABLET BY MOUTH DAILY 9/21/22 10/21/22  Payal Cassette MD Sriram   FEROSUL 325 (65 Fe) MG tablet TAKE 1 TABLET BY MOUTH ONCE DAILY WITH BREAKFAST  Patient not taking: No sig reported 9/9/22   Brianda Peng MD   pantoprazole (PROTONIX) 40 MG tablet Take 1 tablet by mouth daily 9/1/22   Payal Cassette MD Sriram   Lactobacillus (ACIDOPHILUS) CAPS capsule TAKE 1 TABLET BY MOUTH ONCE DAILY IN THE MORNING AND 1 TABLET BEFORE BEDTIME 8/31/22   Shanda Hinds DO   dicyclomine (BENTYL) 20 MG tablet TAKE 1 TABLET (20 MG TOTAL) BY MOUTH IN THE MORNING AND 1 TABLET (20 MG TOTAL) BEFORE BEDTIME. 8/29/22   Shanda Hinds DO   Handicap Placard MISC 5 years 7/19/22   Kayleen Hinds DO   loperamide (IMODIUM) 2 MG capsule Take 2 mg by mouth 4 times daily as needed for Diarrhea    Historical Provider, MD   ondansetron (ZOFRAN-ODT) 4 MG disintegrating tablet Take 1 tablet by mouth every 8 hours as needed for Nausea or Vomiting 5/13/22   Brianda Peng MD   senna (SENOKOT) 8.6 MG tablet Take 1 tablet by mouth 2 times daily 5/2/22 5/2/23  Darryn Clark MD   melatonin 5 MG TABS tablet Take 1 tablet by mouth daily 4/13/22   Shanda Medhkour, DO   benzonatate (TESSALON PERLES) 100 MG capsule Take 1 capsule by mouth 3 times daily as needed for Cough 4/13/22   Shanda Medhkour, DO   apixaban (ELIQUIS) 5 MG TABS tablet Take 1 tablet by mouth 2 times daily 4/8/22   Nicolasa Tran MD   hydrocortisone (ANUSOL-HC) 2.5 % CREA rectal cream Apply on affected area twice daily 2/28/22   Julián Granger MD        Allergies:     Carboplatin and Ceftriaxone    Social History:     Tobacco:    reports that she has been smoking cigarettes. She has a 20.00 pack-year smoking history. She has never used smokeless tobacco.  Alcohol:      reports that she does not currently use alcohol. Drug Use:  reports no history of drug use. Family History:     Family History   Problem Relation Age of Onset    Alcohol Abuse Mother     Cirrhosis Mother        Review of Systems:     Review of Systems   Constitutional:  Negative for appetite change, chills, fatigue, fever and unexpected weight change. HENT:  Negative for congestion, rhinorrhea, sneezing and sore throat. Eyes:  Negative for photophobia, discharge, redness and visual disturbance. Respiratory:  Negative for cough, shortness of breath and wheezing. Cardiovascular:  Negative for chest pain, palpitations and leg swelling. Gastrointestinal:  Negative for abdominal pain, blood in stool, constipation, diarrhea, nausea and vomiting. Endocrine: Negative for cold intolerance and heat intolerance. Genitourinary:  Negative for dysuria, frequency, hematuria and urgency. Musculoskeletal:  Negative for arthralgias, back pain, joint swelling and neck pain. Skin:  Negative for pallor, rash and wound. Allergic/Immunologic: Negative for environmental allergies and food allergies. Neurological:  Positive for speech difficulty. Negative for dizziness, seizures, weakness, light-headedness, numbness and headaches. Hematological:  Negative for adenopathy.  Does not bruise/bleed easily. Psychiatric/Behavioral:  Negative for agitation, confusion, hallucinations and sleep disturbance. The patient is not nervous/anxious. .     Physical Exam:     Physical Exam   Vitals:    Vitals:    10/30/22 1845 10/31/22 0015 10/31/22 0647 10/31/22 1221   BP: 138/69 129/63 (!) 149/80 128/67   Pulse: 77 67 84 88   Resp: 18 16 16 16   Temp: 98.1 °F (36.7 °C) 98 °F (36.7 °C) 97.4 °F (36.3 °C) 97.7 °F (36.5 °C)   TempSrc: Oral Oral Axillary Oral   SpO2: 97% 94% 95% 99%   Weight:                        Body mass index is 22.42 kg/m². Temp (24hrs), Av.8 °F (36.6 °C), Min:97.4 °F (36.3 °C), Max:98.1 °F (36.7 °C)    Recent Labs     10/30/22  1634 10/30/22  2034 10/31/22  0648 10/31/22  1117   POCGLU 89 112* 103 86             General Appearance:   Not oriented to time, place or person. Appears in mild distress ,                 Skin:                             No rash or erythema  HEENT ;                                                                       No icterus, no pallor . No ptosis. No gross asymmetry  Or abnormality face         Neck:                            No mass , no thyroid enlargement                                           Pulmonary/Chest:                                               Symmetric                                          Clear to auscultation bilaterally . No wheezes,                                          No rales or rhonchi . No abnormality on percussion                                                        Cardiovascular:            Normal rate, regular rhythm,                                          No murmur or  Gallop .                                   Abdomen:                       Soft, non-tender                                           Normal bowels sounds,                                             Extremities: No  Edema .                                            Musculo-skeletal / Neurological ;;                  Neurological ;                 Speech difficulty                 No focal sensory deficit ,    Musculo-skeletal ;                  No  gait abnormality                  No significant joint abnormality,                                                                                                                    Investigations:      URINE ANALYSIS: No results found for: LABURIN     CBC:  Lab Results   Component Value Date/Time    WBC 5.2 10/31/2022 04:56 AM    HGB 11.4 10/31/2022 04:56 AM     10/31/2022 04:56 AM             BMP:    Lab Results   Component Value Date/Time     10/31/2022 04:56 AM    K 3.3 10/31/2022 04:56 AM     10/31/2022 04:56 AM    CO2 26 10/31/2022 04:56 AM    BUN 9 10/31/2022 04:56 AM    CREATININE 0.58 10/31/2022 04:56 AM    GLUCOSE 98 10/31/2022 04:56 AM    GLUCOSE 71 10/24/2022 06:32 AM      LIVER PROFILE:  Lab Results   Component Value Date/Time    ALT 6 10/29/2022 04:48 PM    AST 15 10/29/2022 04:48 PM    PROT 6.8 10/29/2022 04:48 PM    BILITOT 0.3 10/29/2022 04:48 PM    BILIDIR <0.08 02/11/2021 02:21 PM    LABALBU 3.9 10/29/2022 04:48 PM             @BRIEFLABT(TSH)@      Laboratory Testing:  Recent Results (from the past 24 hour(s))   POC Glucose Fingerstick    Collection Time: 10/30/22  4:34 PM   Result Value Ref Range    POC Glucose 89 65 - 105 mg/dL   POC Glucose Fingerstick    Collection Time: 10/30/22  8:34 PM   Result Value Ref Range    POC Glucose 112 (H) 65 - 105 mg/dL   Basic Metabolic Panel w/ Reflex to MG    Collection Time: 10/31/22  4:56 AM   Result Value Ref Range    Glucose 98 70 - 99 mg/dL    BUN 9 6 - 20 mg/dL    Creatinine 0.58 0.50 - 0.90 mg/dL    Est, Glom Filt Rate >60 >60 mL/min/1.73m2    Calcium 8.6 8.6 - 10.4 mg/dL    Sodium 141 135 - 144 mmol/L    Potassium 3.3 (L) 3.7 - 5.3 mmol/L    Chloride 105 98 - 107 mmol/L    CO2 26 20 - 31 mmol/L    Anion Gap 10 9 - 17 mmol/L   CBC with Auto Differential    Collection Time: 10/31/22  4:56 AM   Result Value Ref Range    WBC 5.2 3.5 - 11.0 k/uL    RBC 3.78 (L) 4.0 - 5.2 m/uL    Hemoglobin 11.4 (L) 12.0 - 16.0 g/dL    Hematocrit 37.5 36 - 46 %    MCV 99.1 80 - 100 fL    MCH 30.2 26 - 34 pg    MCHC 30.4 (L) 31 - 37 g/dL    RDW 17.3 (H) 11.5 - 14.9 %    Platelets 064 150 - 487 k/uL    MPV 7.0 6.0 - 12.0 fL    Seg Neutrophils 60 36 - 66 %    Lymphocytes 21 (L) 24 - 44 %    Monocytes 12 (H) 1 - 7 %    Eosinophils % 6 (H) 0 - 4 %    Basophils 1 0 - 2 %    Segs Absolute 3.20 1.3 - 9.1 k/uL    Absolute Lymph # 1.10 1.0 - 4.8 k/uL    Absolute Mono # 0.60 0.1 - 1.3 k/uL    Absolute Eos # 0.30 0.0 - 0.4 k/uL    Basophils Absolute 0.00 0.0 - 0.2 k/uL   Magnesium    Collection Time: 10/31/22  4:56 AM   Result Value Ref Range    Magnesium 1.7 1.6 - 2.6 mg/dL   POC Glucose Fingerstick    Collection Time: 10/31/22  6:48 AM   Result Value Ref Range    POC Glucose 103 65 - 105 mg/dL   POC Glucose Fingerstick    Collection Time: 10/31/22 11:17 AM   Result Value Ref Range    POC Glucose 86 65 - 105 mg/dL       Imaging/Diagnostics:  CT HEAD WO CONTRAST    Result Date: 10/29/2022  No acute intracranial abnormality. 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 Minimal chronic microvascular disease. XR CHEST PORTABLE    Result Date: 10/29/2022  1. Possible tiny pleural effusions and scattered interstitial opacities in the lungs, similar to 10/05/2022. No new airspace consolidation. 2.  Visualized loops of bowel in the upper abdomen appear mildly distended with gas. Consider abdominal radiographs. CTA HEAD NECK W CONTRAST    Result Date: 10/29/2022  No acute abnormality or flow-limiting stenosis of the major arteries of the head and neck.  1.6 mm prominent infundibulum versus saccular aneurysm at the origin of the left posterior communicating artery Lung nodules measuring up to 6.3 mm, likely related to infection/inflammation, increased since January 5, 2021. Follow-up is recommended to exclude metastasis. Mildly prominent mediastinal lymph nodes, may be related to infection/inflammation, increased since January 5, 2021. Follow-up is recommended to exclude lymph node metastasis. Assessment :      Hospital Problems             Last Modified POA    * (Principal) Cerebrovascular accident (CVA) ([address] 10/30/2022 Yes    Cerebrovascular accident (CVA) associated with severe acute respiratory syndrome coronavirus 2 (SARS-CoV-2) infection ([address] 10/29/2022 Yes    Difficulty with speech 10/30/2022 Yes    MDD (major depressive disorder), recurrent severe, without psychosis ([address] 10/30/2022 Yes    Epithelial ovarian cancer, FIGO stage OSIRIS ([address] 10/29/2022 Yes    Anemia 10/29/2022 Yes    Seizure disorder, status epilepticus, nonconvulsive ([address] 10/29/2022 Yes    History of ovarian cancer 10/29/2022 Yes    Cancer, metastatic to bone ([address] 10/29/2022 Yes    History of deep venous thrombosis (DVT) of distal vein of left lower extremity: On Eliquis 10/29/2022 Yes    Diabetes mellitus ([address] 10/29/2022 Yes    Gastroesophageal reflux disease 10/29/2022 Yes    Overview Signed 2/21/2022  1:55 AM by Charbel Cedillo MD     Last Assessment & Plan:   Formatting of this note might be different from the original.  Condition: stable    Reviewed use of antacid medication and/or diet modifications of decreasing caffeine, spicy foods, chocolate, and avoiding alcohol, tobacco, NSAIDs, and reducing citrus acids. Follow up in: three months         Delirium due to another medical condition 10/30/2022 Yes    Seizure disorder ([address] 10/30/2022 Yes     Plan:     Acute CVA: Follow up MRI. Neurology consulted. We will appreciate recommendations. Ovarian cancer with mets: Follow with oncology but is not currently on chemotherapy. H/o seizures: Resumed home VIMPAT and lamotrigine.   H/o DVT: Resumed Eliquis  DM: Last HBA1C 5.2 on 4/13/2022. Started on low dose, sliding scale insulin, poct glucose checks and hypoglycemia protocol. HTN: Resumed home amlodipine. Will resume home lopressor as needed. Monitor BP  GERD: Resumed home Protonix  DVT Px: On Eliquis  GI px: On protonix. Disposition : 3 days    10/31  Appreciated input from neurologist and psychiatrist  MRI negative  Olanzapine dose was reduced to 5 mg from 10 mg per psychiatry  DC plan, once placement is arranged  Consultations:   Rita Quezada  IP CONSULT TO PRIMARY CARE PROVIDER  IP CONSULT TO NEUROLOGY  IP CONSULT TO CASE MANAGEMENT  IP CONSULT TO [address] [place] [address] Erwin Segovia MD  10/31/2022    [address], [address] [place] [address]. Phone [phone]   Fax: [phone]  Answering Service: [phone]    10/31/2022  1:37 PM      Electronically signed by Jeffy Sinha MD       Please note that this chart was generated using voice recognition Dragon dictation software. Although every effort was made to ensure the accuracy of this automated transcription, some errors in transcription may have occurred.

## 2022-10-31 NOTE — PROGRESS NOTES
[facility]   IN-PATIENT SERVICE      NEUROLOGY PROGRESS  NOTE            Date:   10/31/2022  Patient name:  Darylene Genet [facility]  Date of admission:  10/29/2022  YOB: 1965      Interval History: This patient has had difficulties with encephalopathy, press and what appears to be persistent seizure activity in spite of escalating doses of anticonvulsants. Today while seeing her she reports that she had the onset of difficulty expressing her word, slurring of speech and a phenomenon of hearing a word repeatedly once word has been spoken and the stimulus was removed. The episode of auditory persistence (hearing award repeatedly after the stimulus has stopped) is similar to visual persistence (seeing an image after the stimulus has been removed). Both syndromes are indicative of cortical irritation as may occur in press, ischemia or other irritative lesions of cortex. These events are forms of what might be considered organic hallucinosis rather than psychiatric hallucinosis. The treatment is to remove the causative or irritative factor. Considering that this patient had a prior episode of PRES (posterior reversible encephalopathy syndrome) a similar process may be taking place with regard to her current neurologic syndrome. Below I included a copy of the EEG report which demonstrates frequent left central parietal sharp waves and lateralized periodic discharges (LPD's) which may represent the causative physiologic phenomenon that does not have an imaging correlate (unless one did functional imaging such as SPECT or PET). Potential causative agent may include certain chemotherapy agents which are known to be associated with PRES or perhaps the rare circumstance of CNS metastases of ovarian carcinoma as an ovarian carcinomatosis. At this point in time there is no specific evidence to suggest ovarian carcinomatosis on the basis of CT and MR imaging.   Clinical presentation does not seem to be consistent with that diagnosis either. History of Present Illness: The patient is a 59 y.o. female who presents with Altered Mental Status  . The patient was seen and examined and the chart was reviewed. This patient was seen by Licha Rincon yesterday on 10/30/2022 for severe aphasia. According to that note, stroke team evaluated the patient and found NIHSS 5 for severe aphasia, mild to moderate dysarthria and LOC questions. CTA showed no significant stenosis or occlusion with left P-comm infundibulum versus aneurysm. CT of the brain showed no acute intracranial abnormality. MRI of the brain was done with and without contrast showing no acute intracranial abnormalities. Patient was admitted for further work-up. Patient's speech abnormality which is new within the past day or so according to the patient include difficulty remembering things having words come out wrong. She states that have been ongoing since she was discharged from hospital on 10/25/2022. She was also continuing to have auditory hallucinations experience at [facility] and started on Seroquel at that time. Patient was switched to Zyprexa at that time. Patient is on lacosamide 150 mg twice daily, Keppra 750 mg twice daily and Lamictal 125 mg twice daily. She did not appear to have any further seizures. LTM E dated 10/5/2022 reported as follows:    Summary: During the recording, the patient had 4 electroclinical seizures with left hand flapping and left head deviation. Electrographically, these seizures were similar to yesterday seizures. The interictal EEG was abnormal due to diffuse polymorphic theta slowing suggesting mild to moderate encephalopathy. Continuous left hemispheric slowing suggested underlying structural defect. Frequent left central parietal sharp waves and lateralized periodic discharges (LPDs) conferred an increased risk for focal onset seizures.   Monitoring was continued in order to record the patient's typical events. The EKG channel revealed no abnormalities. Dany Anna MD  Diplomate, American Board of Psychiatry and Neurology  [de-identified], American Board of Clinical Neurophysiology  Diplomate, American Board of Epilepsy   ====================================================    Past Medical History:     Past Medical History:   Diagnosis Date    Anemia     Bleeding 10/2020    intra-abdominal bleeding -due to splenic mass with GI infiltration. Status post embolization    [facility] ([facility] [address]     Depression     Diabetes mellitus ([facility] [address]     GERD (gastroesophageal reflux disease)     Hx of blood clots     Hypertension     Metastatic cancer ([facility] [address] 10/2020    extensive intraabdominal and splenic involvement and lung mets.  Ovarian cancer ([facility] [address]     low grade serous ovarian carcinoma    Post chemo evaluation     2007: Chemo via med onc (Dr. Jaspreet Chance), 2008: Nikki Bouillon due to rising CA-125, 2013: intraperitoneal chemo,12/2015: Ca125 - 25     PRES (posterior reversible encephalopathy syndrome)     Splenic lesion         Past Surgical History:     Past Surgical History:   Procedure Laterality Date    ABSCESS DRAINAGE  2013    Franca rectal    ANUS SURGERY      ANAL FISSURECTOMY    CARDIAC CATHETERIZATION      COLECTOMY  03/2013    ex lap, tumor debulking, transverse colectomy w reanastamosis, subgastric omentectomy, intraperitoneal port placement    HYSTERECTOMY, TOTAL ABDOMINAL (CERVIX REMOVED)      IR EMBOLIZATION HEMORRHAGE  10/05/2020    intra-abdominal bleeding -due to splenic mass with GI infiltration. Status post embolization [employer] interlock coils x7. mri condtional 3t ok, safe immediately post implant.     IR PORT PLACEMENT EQUAL OR GREATER THAN 5 YEARS  08/24/2020    IR PORT PLACEMENT EQUAL OR GREATER THAN 5 YEARS 8/24/2020 Sandrine Huff MD [facility] PROCED[facility]    PORT SURGERY      IP Port    TONSILLECTOMY          Medications during admission:      OLANZapine  5 mg Oral Nightly    QUEtiapine  50 mg Oral Nightly    sodium chloride flush  10 mL IntraVENous BID    amLODIPine  10 mg Oral Daily    apixaban  5 mg Oral BID    lacosamide  150 mg Oral BID    lactobacillus  1 capsule Oral BID    lamoTRIgine  125 mg Oral BID    levETIRAcetam  750 mg Oral BID    melatonin  6 mg Oral Nightly    miconazole   Topical BID    pantoprazole  40 mg Oral Daily    senna  1 tablet Oral BID    sennosides-docusate sodium  2 tablet Oral Nightly    sodium chloride flush  5-40 mL IntraVENous 2 times per day    insulin lispro  0-4 Units SubCUTAneous TID WC    insulin lispro  0-4 Units SubCUTAneous Nightly         Physical Exam:   BP (!) 149/80   Pulse 84   Temp 97.4 °F (36.3 °C) (Axillary)   Resp 16   Wt 134 lb 11.2 oz (61.1 kg)   SpO2 95%   BMI 22.42 kg/m²   Temp (24hrs), Av.8 °F (36.6 °C), Min:97.4 °F (36.3 °C), Max:98.1 °F (36.7 °C)      Neurological examination:    Mental status   Alert and oriented x 3; following all commands; speech is fluent, no dysarthria, aphasia. Memory is 2/3. Language shows normal reception expression repetition. Insight appears to be intact. Patient did report auditory persistence. No visual persistence identified. No evidence Warnicke's or Broca's aphasia or conductive aphasia.      Cranial nerves   II - visual fields intact to confrontation; pupils reactive  III, IV, VI - extraocular muscles intact; no JOSE A; no nystagmus; no ptosis   V - normal facial sensation                                                               VII - normal facial symmetry                                                             VIII - intact hearing                                                                             IX, X -normal phonation                                              XI -normal head turning                                                    XII - midline tongue      Motor function  Strength: 5/5 RUE, 5/5 RLE, 5/5 LUE, 5/5  LLE  Normal bulk and tone. No tremors                      Sensory function Intact to touch, vibration throughout     Cerebellar Intact finger-nose-finger testing. No cogwheeling rigidity tremor or bradykinesia. Reflex function 1/4 symmetric throughout . Downgoing plantar response bilaterally. Gait                  Deferred             Diagnostics:      Laboratory Testing:  CBC:   Recent Labs     10/29/22  1455 10/30/22  0456 10/31/22  0456   WBC 10.2 6.2 5.2   HGB 12.2 11.6* 11.4*    257 248     BMP:    Recent Labs     10/29/22  1648 10/30/22  0456 10/31/22  0456    141 141   K 3.1* 3.6* 3.3*    105 105   CO2 27 25 26   BUN 9 13 9   CREATININE 0.64 0.68 0.58   GLUCOSE 97 82 98         Lab Results   Component Value Date    CHOL 131 03/10/2021    LDLCHOLESTEROL 69 03/10/2021    HDL 33 (L) 03/10/2021    TRIG 147 03/10/2021    ALT 6 10/29/2022    AST 15 10/29/2022    TSH 2.93 03/09/2021    INR 1.2 10/29/2022    LABA1C 5.2 04/13/2022    [id number] 654 06/04/2022       No results found for: PHENYTOIN, PHENYTOIN, VALPROATE, CBMZ      Impression:      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 Seizure disorder. This may be on the basis of cortical irritation. Prior history of PRES with visual disturbance and headache. Prior history of schizoaffective disorder  Triple drug therapy for seizure disorder  MRI 10/29/2022 showed no acute stroke, midline shift mass-effect. Chronic microvascular disease. Right-sided posterior temporal and occipital lobe encephalomalacia. Some of patient's medications including Zyprexa may be a factor in lowering seizure threshold. Psychiatry is currently evaluating the patient. Evaluation of patient's cancer therapy for review of medications and tendency to cause press may be important in this case    Plan:     Patient request to not have EEG repeated.   I do not think a new EEG at this time is required and we will cancel it. Psychiatry to review of medications that may play a factor in lowering seizure threshold  Seizure precautions.   Consider review of cancer therapy drugs with respect to tendency to cause encephalopathy or PRES  Neurology will follow        Electronically signed by Zi Nelson MD on 10/31/2022 at 11:20 AM      Zi Nelson MD  [facility]  Neurology

## 2022-10-31 NOTE — PLAN OF CARE
Problem: Discharge Planning  Goal: Discharge to home or other facility with appropriate resources  10/31/2022 0437 by Mita Hess RN  Outcome: Progressing     Problem: Skin/Tissue Integrity  Goal: Absence of new skin breakdown  Description: 1. Monitor for areas of redness and/or skin breakdown  2. Assess vascular access sites hourly  3. Every 4-6 hours minimum:  Change oxygen saturation probe site  4. Every 4-6 hours:  If on nasal continuous positive airway pressure, respiratory therapy assess nares and determine need for appliance change or resting period.   10/31/2022 0437 by Mita Hess RN  Outcome: Progressing     Problem: Safety - Adult  Goal: Free from fall injury  10/31/2022 0437 by Mita Hess RN  Outcome: Progressing     Problem: ABCDS Injury Assessment  Goal: Absence of physical injury  10/31/2022 0437 by Mita Hess RN  Outcome: Progressing

## 2022-10-31 NOTE — CARE COORDINATION
ONGOING DISCHARGE PLAN:    Patient is alert and oriented x4. Spoke with patient regarding discharge plan and patient confirms that plan is still to return to [facility]. Spoke with Preston Crenshaw from [facility] who states she will start pre-cert today. Will continue to follow for additional discharge needs.     Electronically signed by Ritu Martinez RN on 10/31/2022 at 11:14 AM

## 2022-11-01 VITALS
SYSTOLIC BLOOD PRESSURE: 125 MMHG | OXYGEN SATURATION: 98 % | DIASTOLIC BLOOD PRESSURE: 58 MMHG | RESPIRATION RATE: 16 BRPM | WEIGHT: 140.43 LBS | HEART RATE: 81 BPM | TEMPERATURE: 98.2 F | BODY MASS INDEX: 23.37 KG/M2

## 2022-11-01 PROBLEM — I63.9 CEREBROVASCULAR ACCIDENT (CVA) ASSOCIATED WITH SEVERE ACUTE RESPIRATORY SYNDROME CORONAVIRUS 2 (SARS-COV-2) INFECTION (HCC): Status: RESOLVED | Noted: 2022-10-29 | Resolved: 2022-11-01

## 2022-11-01 PROBLEM — J96.01 ACUTE RESPIRATORY FAILURE WITH HYPOXIA (HCC): Status: RESOLVED | Noted: 2022-02-21 | Resolved: 2022-11-01

## 2022-11-01 PROBLEM — U07.1 CEREBROVASCULAR ACCIDENT (CVA) ASSOCIATED WITH SEVERE ACUTE RESPIRATORY SYNDROME CORONAVIRUS 2 (SARS-COV-2) INFECTION (HCC): Status: RESOLVED | Noted: 2022-10-29 | Resolved: 2022-11-01

## 2022-11-01 LAB
ABSOLUTE EOS #: 0.2 K/UL (ref 0–0.4)
ABSOLUTE LYMPH #: 1.2 K/UL (ref 1–4.8)
ABSOLUTE MONO #: 0.5 K/UL (ref 0.1–1.3)
ANION GAP SERPL CALCULATED.3IONS-SCNC: 9 MMOL/L (ref 9–17)
BASOPHILS # BLD: 0 % (ref 0–2)
BASOPHILS ABSOLUTE: 0 K/UL (ref 0–0.2)
BUN BLDV-MCNC: 8 MG/DL (ref 6–20)
CALCIUM SERPL-MCNC: 8.6 MG/DL (ref 8.6–10.4)
CHLORIDE BLD-SCNC: 107 MMOL/L (ref 98–107)
CO2: 26 MMOL/L (ref 20–31)
CREAT SERPL-MCNC: 0.6 MG/DL (ref 0.5–0.9)
EOSINOPHILS RELATIVE PERCENT: 2 % (ref 0–4)
GFR SERPL CREATININE-BSD FRML MDRD: >60 ML/MIN/1.73M2
GLUCOSE BLD-MCNC: 101 MG/DL (ref 65–105)
GLUCOSE BLD-MCNC: 104 MG/DL (ref 65–105)
GLUCOSE BLD-MCNC: 91 MG/DL (ref 65–105)
GLUCOSE BLD-MCNC: 99 MG/DL (ref 70–99)
HCT VFR BLD CALC: 34.5 % (ref 36–46)
HEMOGLOBIN: 11.3 G/DL (ref 12–16)
LYMPHOCYTES # BLD: 11 % (ref 24–44)
MCH RBC QN AUTO: 30.4 PG (ref 26–34)
MCHC RBC AUTO-ENTMCNC: 32.6 G/DL (ref 31–37)
MCV RBC AUTO: 93.3 FL (ref 80–100)
MONOCYTES # BLD: 5 % (ref 1–7)
PDW BLD-RTO: 17 % (ref 11.5–14.9)
PLATELET # BLD: 239 K/UL (ref 150–450)
PMV BLD AUTO: 7.2 FL (ref 6–12)
POTASSIUM SERPL-SCNC: 3.7 MMOL/L (ref 3.7–5.3)
RBC # BLD: 3.7 M/UL (ref 4–5.2)
SARS-COV-2, RAPID: NOT DETECTED
SEG NEUTROPHILS: 82 % (ref 36–66)
SEGMENTED NEUTROPHILS ABSOLUTE COUNT: 8.2 K/UL (ref 1.3–9.1)
SODIUM BLD-SCNC: 142 MMOL/L (ref 135–144)
SPECIMEN DESCRIPTION: NORMAL
WBC # BLD: 10.1 K/UL (ref 3.5–11)

## 2022-11-01 PROCEDURE — 6370000000 HC RX 637 (ALT 250 FOR IP)

## 2022-11-01 PROCEDURE — 85025 COMPLETE CBC W/AUTO DIFF WBC: CPT

## 2022-11-01 PROCEDURE — 6370000000 HC RX 637 (ALT 250 FOR IP): Performed by: INTERNAL MEDICINE

## 2022-11-01 PROCEDURE — 99232 SBSQ HOSP IP/OBS MODERATE 35: CPT | Performed by: PSYCHIATRY & NEUROLOGY

## 2022-11-01 PROCEDURE — 87635 SARS-COV-2 COVID-19 AMP PRB: CPT

## 2022-11-01 PROCEDURE — 36415 COLL VENOUS BLD VENIPUNCTURE: CPT

## 2022-11-01 PROCEDURE — 99239 HOSP IP/OBS DSCHRG MGMT >30: CPT | Performed by: INTERNAL MEDICINE

## 2022-11-01 PROCEDURE — 80048 BASIC METABOLIC PNL TOTAL CA: CPT

## 2022-11-01 PROCEDURE — 82947 ASSAY GLUCOSE BLOOD QUANT: CPT

## 2022-11-01 RX ORDER — QUETIAPINE FUMARATE 100 MG/1
100 TABLET, FILM COATED ORAL NIGHTLY
Qty: 60 TABLET | Refills: 3 | Status: SHIPPED | OUTPATIENT
Start: 2022-11-01 | End: 2022-11-03

## 2022-11-01 RX ORDER — LACOSAMIDE 150 MG/1
150 TABLET ORAL 2 TIMES DAILY
Qty: 60 TABLET | Refills: 3 | Status: SHIPPED | OUTPATIENT
Start: 2022-11-01 | End: 2025-11-02

## 2022-11-01 RX ADMIN — LOPERAMIDE HYDROCHLORIDE 2 MG: 2 CAPSULE ORAL at 02:18

## 2022-11-01 RX ADMIN — LAMOTRIGINE 125 MG: 100 TABLET ORAL at 07:47

## 2022-11-01 RX ADMIN — LEVETIRACETAM 750 MG: 750 TABLET, FILM COATED ORAL at 07:42

## 2022-11-01 RX ADMIN — ACETAMINOPHEN 650 MG: 325 TABLET, FILM COATED ORAL at 07:53

## 2022-11-01 RX ADMIN — ACETAMINOPHEN 650 MG: 325 TABLET, FILM COATED ORAL at 13:36

## 2022-11-01 RX ADMIN — LOPERAMIDE HYDROCHLORIDE 2 MG: 2 CAPSULE ORAL at 13:36

## 2022-11-01 RX ADMIN — APIXABAN 5 MG: 5 TABLET, FILM COATED ORAL at 07:42

## 2022-11-01 RX ADMIN — PANTOPRAZOLE SODIUM 40 MG: 40 TABLET, DELAYED RELEASE ORAL at 07:42

## 2022-11-01 RX ADMIN — LOPERAMIDE HYDROCHLORIDE 2 MG: 2 CAPSULE ORAL at 07:41

## 2022-11-01 RX ADMIN — AMLODIPINE BESYLATE 10 MG: 10 TABLET ORAL at 07:42

## 2022-11-01 RX ADMIN — LACOSAMIDE 150 MG: 150 TABLET, FILM COATED ORAL at 07:41

## 2022-11-01 RX ADMIN — Medication 1 CAPSULE: at 07:42

## 2022-11-01 ASSESSMENT — PAIN SCALES - GENERAL
PAINLEVEL_OUTOF10: 8
PAINLEVEL_OUTOF10: 8

## 2022-11-01 ASSESSMENT — PAIN DESCRIPTION - LOCATION
LOCATION: ABDOMEN
LOCATION: BACK

## 2022-11-01 ASSESSMENT — ENCOUNTER SYMPTOMS
PHOTOPHOBIA: 0
EYE DISCHARGE: 0
CONSTIPATION: 0
BACK PAIN: 0
NAUSEA: 0
DIARRHEA: 0
VOMITING: 0
ABDOMINAL PAIN: 0
SHORTNESS OF BREATH: 0
RHINORRHEA: 0
COUGH: 0
BLOOD IN STOOL: 0
SORE THROAT: 0
WHEEZING: 0
EYE REDNESS: 0

## 2022-11-01 ASSESSMENT — PAIN DESCRIPTION - DESCRIPTORS
DESCRIPTORS: DULL
DESCRIPTORS: SHARP

## 2022-11-01 ASSESSMENT — PAIN DESCRIPTION - ORIENTATION
ORIENTATION: RIGHT;LOWER
ORIENTATION: MID;POSTERIOR

## 2022-11-01 NOTE — PROGRESS NOTES
Dr Chino Ch aware pt does not want cxr, \"I don't know why I'm getting another test.\" May cancel test.

## 2022-11-01 NOTE — PROGRESS NOTES
Tried calling report at both # on SW's note [phone] and website number at [phone]. No answer to either one.  Unable to call report at this time

## 2022-11-01 NOTE — PROGRESS NOTES
Discharge paperwork to [facility]. Discharged in a wheelchair with oxygen tank and via [facility] to skilled nursing in stable condition with belongings. Skin ppk/w/d. Easy respirations.

## 2022-11-01 NOTE — CARE COORDINATION
Transport arranged for patient to go to [address] [place] [address] at [facility] via [employer]. Facility informed. Bedside nurse informed. Patient informed.     Number for Report: [phone]    Electronically signed by RIGOBERTO Roman on 11/1/2022 at 1:20 PM

## 2022-11-01 NOTE — PROGRESS NOTES
Patient complains of pain at IV insertion site flushes with NS with good blood return    patient states \"I dont know why I have this I'm not getting any medicine in it  can you just take it out. IV removed explained to patient may need to restart.   Patient states if I need medicine then you can restart it   Odette CNP notified  IV remains out at this time patient refuses to have replaced

## 2022-11-01 NOTE — DISCHARGE INSTR - DIET

## 2022-11-01 NOTE — PROGRESS NOTES
[facility]   IN-PATIENT SERVICE      NEUROLOGY PROGRESS  NOTE            Date:   11/1/2022  Patient name:  Perla Taveras [facility]  Date of admission:  10/29/2022  YOB: 1965      Interval History:     Since yesterday patient has had marked improvement. She has no difficulty with word output. She does feel better. She no longer describes auditory persistence which was part of her difficulties yesterday. She is not having auditory hallucinations per se but actually has a neurologic phenomenon known as auditory persistence which appears to have been suppressed by Lamictal Keppra and Vimpat. She also had evaluation by psychiatry. Change in her psychiatric medication with discontinuance of Zyprexa and reinstitution of Seroquel as noted. History of Present Illness: The patient is a 59 y.o. female who presents with Altered Mental Status  . The patient was seen and examined and the chart was reviewed. This patient has a history of seizure disorder which has been difficult to control. She does have prior history of FL ES. She also has a history of abnormal EEG which documented frequent left central parietal sharp waves and lateralized periodic discharges. Neurology was consulted to evaluate the patient for speech abnormality. Patient had difficulty expressing words and some confusion. Concern for stroke was raised. Concern for seizure activity was raised. Her seizures are treated with Lamictal Keppra and Vimpat. She had not been able to take Vimpat regularly perhaps because of cost.    Patient's anticonvulsant therapy has been given to her at this point in time. Past Medical History:     Past Medical History:   Diagnosis Date    Anemia     Bleeding 10/2020    intra-abdominal bleeding -due to splenic mass with GI infiltration.   Status post embolization    Cervical cancer ([address]     Depression     Diabetes mellitus (HCC)     GERD (gastroesophageal reflux disease)     Hx of blood clots     Hypertension     Metastatic cancer ([facility] [address] 10/2020    extensive intraabdominal and splenic involvement and lung mets.  Ovarian cancer ([facility] [address]     low grade serous ovarian carcinoma    Post chemo evaluation     2007: Chemo via med onc (Dr. Bill Martino), 2008: Ardell Peace due to rising CA-125, 2013: intraperitoneal chemo,12/2015: Ca125 - 25     PRES (posterior reversible encephalopathy syndrome)     Splenic lesion         Past Surgical History:     Past Surgical History:   Procedure Laterality Date    ABSCESS DRAINAGE  2013    Franca rectal    ANUS SURGERY      ANAL FISSURECTOMY    CARDIAC CATHETERIZATION      COLECTOMY  03/2013    ex lap, tumor debulking, transverse colectomy w reanastamosis, subgastric omentectomy, intraperitoneal port placement    HYSTERECTOMY, TOTAL ABDOMINAL (CERVIX REMOVED)      IR EMBOLIZATION HEMORRHAGE  10/05/2020    intra-abdominal bleeding -due to splenic mass with GI infiltration. Status post embolization [employer] interlock coils x7. mri condtional 3t ok, safe immediately post implant.     IR PORT PLACEMENT EQUAL OR GREATER THAN 5 YEARS  08/24/2020    IR PORT PLACEMENT EQUAL OR GREATER THAN 5 YEARS 8/24/2020 Tay Costello MD [facility]    PORT SURGERY      IP Port    TONSILLECTOMY          Medications during admission:      lidocaine  1 patch TransDERmal Daily    QUEtiapine  100 mg Oral Nightly    amLODIPine  10 mg Oral Daily    apixaban  5 mg Oral BID    lacosamide  150 mg Oral BID    lactobacillus  1 capsule Oral BID    lamoTRIgine  125 mg Oral BID    levETIRAcetam  750 mg Oral BID    melatonin  6 mg Oral Nightly    miconazole   Topical BID    pantoprazole  40 mg Oral Daily    senna  1 tablet Oral BID    sennosides-docusate sodium  2 tablet Oral Nightly    insulin lispro  0-4 Units SubCUTAneous TID WC    insulin lispro  0-4 Units SubCUTAneous Nightly         Physical Exam:   BP (!) 125/58   Pulse 81   Temp 98.2 °F (36.8 °C) (Oral) Resp 16   Wt 140 lb 6.9 oz (63.7 kg)   SpO2 98%   BMI 23.37 kg/m²   Temp (24hrs), Av.5 °F (36.9 °C), Min:98.1 °F (36.7 °C), Max:99 °F (37.2 °C)      Neurological examination:    Mental status   Alert and oriented x 3; following all commands; speech is fluent, no dysarthria, aphasia. No difficulty with language in terms of finding words understanding words expressing words repeating words. Cranial nerves   II - visual fields intact to confrontation; pupils reactive she does complain of slight fuzziness of vision at this time no headache. III, IV, VI - extraocular muscles intact  V - normal facial sensation                                                               VII - normal facial symmetry                                                             VIII - intact hearing                                                                             IX, X -normal phonation                                              XI -normal head turning                                                      XII - midline tongue      Motor function  Strength: 5/5 RUE, 5/5 RLE, 5/5 LUE, 5/5  LLE  Normal bulk and tone.    No tremors                      Sensory function Intact to touch throughout     Cerebellar No tremors cogwheeling or rigidity   Reflex function No changes in reflexes   Gait                  Patient able to ambulate to bathroom             Diagnostics:      Laboratory Testing:  CBC:   Recent Labs     10/30/22  0456 10/31/22  0456 11/01/22  0527   WBC 6.2 5.2 10.1   HGB 11.6* 11.4* 11.3*    248 239     BMP:    Recent Labs     10/30/22  0456 10/31/22  0456 11/01/22  0527    141 142   K 3.6* 3.3* 3.7    105 107   CO2 25 26 26   BUN 13 9 8   CREATININE 0.68 0.58 0.60   GLUCOSE 82 98 99         Lab Results   Component Value Date    CHOL 131 03/10/2021    LDLCHOLESTEROL 69 03/10/2021    HDL 33 (L) 03/10/2021    TRIG 147 03/10/2021    ALT 6 10/29/2022    AST 15 10/29/2022    TSH 2.93 03/09/2021    INR 1.2 10/29/2022    LABA1C 5.2 04/13/2022    [id number] 654 06/04/2022       No results found for: PHENYTOIN, PHENYTOIN, VALPROATE, CBMZ      Impression:      Language disorder likely related to patient's known seizure focus. Improved with triple anticonvulsant therapy  Prior history of press  Right-sided posterior temporal and occipital lobe encephalomalacia. Ovarian cancer. I note that Dr. Ivon Solorzano note from yesterday indicates patient was not on chemotherapy medications at this time. This removes the possibility of chemotherapeutic effects from differential diagnosis with regard to temporal lobe dysfunction. Plan:     Have patient continue triple anticonvulsant therapy. Please have patient make a follow-up appointment with neurology at discharge. Neurology will sign off at this time. Please call if there are any further changes or questions.         Electronically signed by Parish Albrecht MD on 11/1/2022 at 1:06 PM      Parish Albrecht MD  [facility]  Neurology

## 2022-11-01 NOTE — PROGRESS NOTES
psychiatry    Past Medical History:     Past Medical History:   Diagnosis Date    Anemia     Bleeding 10/2020    intra-abdominal bleeding -due to splenic mass with GI infiltration. Status post embolization    [facility]     Depression     Diabetes mellitus ([facility]     GERD (gastroesophageal reflux disease)     Hx of blood clots     Hypertension     Metastatic cancer ([facility] 10/2020    extensive intraabdominal and splenic involvement and lung mets.  Ovarian cancer ([facility]     low grade serous ovarian carcinoma    Post chemo evaluation     2007: Chemo via med onc (Dr. Ramandeep Bahena), 2008: Ceclia Parvez due to rising CA-125, 2013: intraperitoneal chemo,12/2015: Ca125 - 25     PRES (posterior reversible encephalopathy syndrome)     Splenic lesion         Past Surgical History:     Past Surgical History:   Procedure Laterality Date    ABSCESS DRAINAGE  2013    Franca rectal    ANUS SURGERY      ANAL FISSURECTOMY    CARDIAC CATHETERIZATION      COLECTOMY  03/2013    ex lap, tumor debulking, transverse colectomy w reanastamosis, subgastric omentectomy, intraperitoneal port placement    HYSTERECTOMY, TOTAL ABDOMINAL (CERVIX REMOVED)      IR EMBOLIZATION HEMORRHAGE  10/05/2020    intra-abdominal bleeding -due to splenic mass with GI infiltration. Status post embolization [employer] interlock coils x7. mri condtional 3t ok, safe immediately post implant.  IR PORT PLACEMENT EQUAL OR GREATER THAN 5 YEARS  08/24/2020    IR PORT PLACEMENT EQUAL OR GREATER THAN 5 YEARS 8/24/2020 Vivian Cole MD [facility] SPECIAL PROCEDURES    PORT SURGERY      IP Port    TONSILLECTOMY          Medications Prior to Admission:     Prior to Admission medications    Medication Sig Start Date End Date Taking? Authorizing Provider   lacosamide (VIMPAT) 150 MG TABS tablet Take 1 tablet by mouth 2 times daily.  10/14/22 10/15/25  ESTRELLITA Martinez - CNP   levETIRAcetam (KEPPRA) 750 MG tablet Take 1 tablet by mouth 2 times daily  Patient taking differently: Take 750 mg by mouth 2 times daily Might be taking a different dose 10/14/22   Tha Porras, APRN - CNP   OLANZapine (ZYPREXA) 5 MG tablet Take 1 tablet by mouth nightly  Patient taking differently: Take 10 mg by mouth nightly 10/13/22   Veronica Christensen MD   amLODIPine (NORVASC) 10 MG tablet Take 1 tablet by mouth daily 10/11/22   Fer Mares MD   metoprolol tartrate (LOPRESSOR) 25 MG tablet Take 1 tablet by mouth 2 times daily 10/10/22   Fer Mares MD   miconazole (ZEASORB-AF) 2 % powder Apply topically 2 times daily. 10/10/22   Jhonny Bumpers, MD   STIMULANT LAXATIVE 8.6-50 MG per tablet TAKE 2 TABLETS BY MOUTH NIGHTLY.  10/6/22   Shanda Hinds DO   lamoTRIgine (LAMICTAL) 25 MG tablet Take 5 tablets by mouth 2 times daily 9/22/22   Shanda Hinds DO   sertraline (ZOLOFT) 100 MG tablet TAKE 1 TABLET BY MOUTH DAILY 9/21/22 10/21/22  Samuel Bhatia MD   FEROSUL 325 (65 Fe) MG tablet TAKE 1 TABLET BY MOUTH ONCE DAILY WITH BREAKFAST  Patient not taking: No sig reported 9/9/22   Kat Velásquez MD   pantoprazole (PROTONIX) 40 MG tablet Take 1 tablet by mouth daily 9/1/22   Samuel Bhatia MD   Lactobacillus (ACIDOPHILUS) CAPS capsule TAKE 1 TABLET BY MOUTH ONCE DAILY IN THE MORNING AND 1 TABLET BEFORE BEDTIME 8/31/22   Shanda Hinds DO   dicyclomine (BENTYL) 20 MG tablet TAKE 1 TABLET (20 MG TOTAL) BY MOUTH IN THE MORNING AND 1 TABLET (20 MG TOTAL) BEFORE BEDTIME. 8/29/22   Shanda Hinds DO   Handicap Placard MISC 5 years 7/19/22   [facility] DO Guzman   loperamide (IMODIUM) 2 MG capsule Take 2 mg by mouth 4 times daily as needed for Diarrhea    Historical Provider, MD   ondansetron (ZOFRAN-ODT) 4 MG disintegrating tablet Take 1 tablet by mouth every 8 hours as needed for Nausea or Vomiting 5/13/22   Kat Velásquez MD   senna (SENOKOT) 8.6 MG tablet Take 1 tablet by mouth 2 times daily 5/2/22 5/2/23  Fiordaliza Saravia MD   melatonin 5 MG TABS tablet Take 1 tablet by mouth daily 4/13/22   Shanda Medhkour, DO   benzonatate (TESSALON PERLES) 100 MG capsule Take 1 capsule by mouth 3 times daily as needed for Cough 4/13/22   Shanda Medhkour, DO   apixaban (ELIQUIS) 5 MG TABS tablet Take 1 tablet by mouth 2 times daily 4/8/22   Jose Babcock MD   hydrocortisone (ANUSOL-HC) 2.5 % CREA rectal cream Apply on affected area twice daily 2/28/22   Daphnie Hernández MD        Allergies:     Carboplatin and Ceftriaxone    Social History:     Tobacco:    reports that she has been smoking cigarettes. She has a 20.00 pack-year smoking history. She has never used smokeless tobacco.  Alcohol:      reports that she does not currently use alcohol. Drug Use:  reports no history of drug use. Family History:     Family History   Problem Relation Age of Onset    Alcohol Abuse Mother     Cirrhosis Mother        Review of Systems:     Review of Systems   Constitutional:  Negative for appetite change, chills, fatigue, fever and unexpected weight change. HENT:  Negative for congestion, rhinorrhea, sneezing and sore throat. Eyes:  Negative for photophobia, discharge, redness and visual disturbance. Respiratory:  Negative for cough, shortness of breath and wheezing. Cardiovascular:  Negative for chest pain, palpitations and leg swelling. Gastrointestinal:  Negative for abdominal pain, blood in stool, constipation, diarrhea, nausea and vomiting. Endocrine: Negative for cold intolerance and heat intolerance. Genitourinary:  Negative for dysuria, frequency, hematuria and urgency. Musculoskeletal:  Negative for arthralgias, back pain, joint swelling and neck pain. Skin:  Negative for pallor, rash and wound. Allergic/Immunologic: Negative for environmental allergies and food allergies. Neurological:  Positive for speech difficulty. Negative for dizziness, seizures, weakness, light-headedness, numbness and headaches. Hematological:  Negative for adenopathy.  Does not bruise/bleed easily. Psychiatric/Behavioral:  Negative for agitation, confusion, hallucinations and sleep disturbance. The patient is not nervous/anxious. .     Physical Exam:     Physical Exam   Vitals:    Vitals:    10/31/22 1902 22 0000 22 0430 22 0700   BP: 130/68 130/67  (!) 114/53   Pulse: 76 (!) 102  73   Resp: 18 16  16   Temp: 98.8 °F (37.1 °C) 98.1 °F (36.7 °C)  99 °F (37.2 °C)   TempSrc: Oral Oral  Oral   SpO2: 99% 97%  99%   Weight:   140 lb 6.9 oz (63.7 kg)                     Body mass index is 23.37 kg/m². Temp (24hrs), Av.4 °F (36.9 °C), Min:97.7 °F (36.5 °C), Max:99 °F (37.2 °C)    Recent Labs     10/31/22  1605 10/31/22  2045 22  0652 22  1123   POCGLU 96 97 91 101               General Appearance:   Not oriented to time, place or person. Appears in mild distress ,                 Skin:                             No rash or erythema  HEENT ;                                                                       No icterus, no pallor . No ptosis. No gross asymmetry  Or abnormality face         Neck:                            No mass , no thyroid enlargement                                           Pulmonary/Chest:                                               Symmetric                                          Clear to auscultation bilaterally . No wheezes,                                          No rales or rhonchi . No abnormality on percussion                                                        Cardiovascular:            Normal rate, regular rhythm,                                          No murmur or  Gallop .                                   Abdomen:                       Soft, non-tender                                           Normal bowels sounds,                                             Extremities: No  Edema .                                            Musculo-skeletal / Neurological ;;                  Neurological ;                 Speech difficulty                 No focal sensory deficit ,    Musculo-skeletal ;                  No  gait abnormality                  No significant joint abnormality,                                                                                                                    Investigations:      URINE ANALYSIS: No results found for: LABURIN     CBC:  Lab Results   Component Value Date/Time    WBC 10.1 11/01/2022 05:27 AM    HGB 11.3 11/01/2022 05:27 AM     11/01/2022 05:27 AM             BMP:    Lab Results   Component Value Date/Time     11/01/2022 05:27 AM    K 3.7 11/01/2022 05:27 AM     11/01/2022 05:27 AM    CO2 26 11/01/2022 05:27 AM    BUN 8 11/01/2022 05:27 AM    CREATININE 0.60 11/01/2022 05:27 AM    GLUCOSE 99 11/01/2022 05:27 AM    GLUCOSE 71 10/24/2022 06:32 AM      LIVER PROFILE:  Lab Results   Component Value Date/Time    ALT 6 10/29/2022 04:48 PM    AST 15 10/29/2022 04:48 PM    PROT 6.8 10/29/2022 04:48 PM    BILITOT 0.3 10/29/2022 04:48 PM    BILIDIR <0.08 02/11/2021 02:21 PM    LABALBU 3.9 10/29/2022 04:48 PM             @BRIEFLABT(TSH)@      Laboratory Testing:  Recent Results (from the past 24 hour(s))   POC Glucose Fingerstick    Collection Time: 10/31/22  4:05 PM   Result Value Ref Range    POC Glucose 96 65 - 105 mg/dL   POC Glucose Fingerstick    Collection Time: 10/31/22  8:45 PM   Result Value Ref Range    POC Glucose 97 65 - 105 mg/dL   Basic Metabolic Panel w/ Reflex to MG    Collection Time: 11/01/22  5:27 AM   Result Value Ref Range    Glucose 99 70 - 99 mg/dL    BUN 8 6 - 20 mg/dL    Creatinine 0.60 0.50 - 0.90 mg/dL    Est, Glom Filt Rate >60 >60 mL/min/1.73m2    Calcium 8.6 8.6 - 10.4 mg/dL    Sodium 142 135 - 144 mmol/L    Potassium 3.7 3.7 - 5.3 mmol/L    Chloride 107 98 - 107 mmol/L    CO2 26 20 - 31 mmol/L Anion Gap 9 9 - 17 mmol/L   CBC with Auto Differential    Collection Time: 11/01/22  5:27 AM   Result Value Ref Range    WBC 10.1 3.5 - 11.0 k/uL    RBC 3.70 (L) 4.0 - 5.2 m/uL    Hemoglobin 11.3 (L) 12.0 - 16.0 g/dL    Hematocrit 34.5 (L) 36 - 46 %    MCV 93.3 80 - 100 fL    MCH 30.4 26 - 34 pg    MCHC 32.6 31 - 37 g/dL    RDW 17.0 (H) 11.5 - 14.9 %    Platelets 050 080 - 841 k/uL    MPV 7.2 6.0 - 12.0 fL    Seg Neutrophils 82 (H) 36 - 66 %    Lymphocytes 11 (L) 24 - 44 %    Monocytes 5 1 - 7 %    Eosinophils % 2 0 - 4 %    Basophils 0 0 - 2 %    Segs Absolute 8.20 1.3 - 9.1 k/uL    Absolute Lymph # 1.20 1.0 - 4.8 k/uL    Absolute Mono # 0.50 0.1 - 1.3 k/uL    Absolute Eos # 0.20 0.0 - 0.4 k/uL    Basophils Absolute 0.00 0.0 - 0.2 k/uL   POC Glucose Fingerstick    Collection Time: 11/01/22  6:52 AM   Result Value Ref Range    POC Glucose 91 65 - 105 mg/dL   POC Glucose Fingerstick    Collection Time: 11/01/22 11:23 AM   Result Value Ref Range    POC Glucose 101 65 - 105 mg/dL       Imaging/Diagnostics:  CT HEAD WO CONTRAST    Result Date: 10/29/2022  No acute intracranial abnormality. 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 Minimal chronic microvascular disease. XR CHEST PORTABLE    Result Date: 10/29/2022  1. Possible tiny pleural effusions and scattered interstitial opacities in the lungs, similar to 10/05/2022. No new airspace consolidation. 2.  Visualized loops of bowel in the upper abdomen appear mildly distended with gas. Consider abdominal radiographs. CTA HEAD NECK W CONTRAST    Result Date: 10/29/2022  No acute abnormality or flow-limiting stenosis of the major arteries of the head and neck. 1.6 mm prominent infundibulum versus saccular aneurysm at the origin of the left posterior communicating artery Lung nodules measuring up to 6.3 mm, likely related to infection/inflammation, increased since January 5, 2021. Follow-up is recommended to exclude metastasis. Mildly prominent mediastinal lymph nodes, may be related to infection/inflammation, increased since January 5, 2021. Follow-up is recommended to exclude lymph node metastasis. Assessment :      Hospital Problems             Last Modified POA    * (Principal) Cerebrovascular accident (CVA) ([address] 10/30/2022 Yes    Cerebrovascular accident (CVA) associated with severe acute respiratory syndrome coronavirus 2 (SARS-CoV-2) infection ([address] 10/29/2022 Yes    Difficulty with speech 10/30/2022 Yes    MDD (major depressive disorder), recurrent severe, without psychosis ([address] 10/30/2022 Yes    Epithelial ovarian cancer, FIGO stage OSIRIS ([address] 10/29/2022 Yes    Anemia 10/29/2022 Yes    Seizure disorder, status epilepticus, nonconvulsive ([address] 10/29/2022 Yes    History of ovarian cancer 10/29/2022 Yes    Cancer, metastatic to bone ([address] 10/29/2022 Yes    History of deep venous thrombosis (DVT) of distal vein of left lower extremity: On Eliquis 10/29/2022 Yes    Diabetes mellitus ([address] 10/29/2022 Yes    Gastroesophageal reflux disease 10/29/2022 Yes    Overview Signed 2/21/2022  1:55 AM by Dashawn Juarez MD     Last Assessment & Plan:   Formatting of this note might be different from the original.  Condition: stable    Reviewed use of antacid medication and/or diet modifications of decreasing caffeine, spicy foods, chocolate, and avoiding alcohol, tobacco, NSAIDs, and reducing citrus acids. Follow up in: three months         Delirium due to another medical condition 10/30/2022 Yes    Seizure disorder ([address] 10/30/2022 Yes     Plan:     Acute CVA: Follow up MRI. Neurology consulted. We will appreciate recommendations. Ovarian cancer with mets: Follow with oncology but is not currently on chemotherapy. H/o seizures: Resumed home VIMPAT and lamotrigine. H/o DVT: Resumed Eliquis  DM: Last HBA1C 5.2 on 4/13/2022. Started on low dose, sliding scale insulin, poct glucose checks and hypoglycemia protocol.   HTN: Resumed home amlodipine. Will resume home lopressor as needed. Monitor BP  GERD: Resumed home Protonix  DVT Px: On Eliquis  GI px: On protonix. Nov 1  Appreciated input from neurologist and psychiatrist  MRI negative  Olanzapine dose was reduced to 5 mg from 10 mg per psychiatry  DC plan, once placement is arranged    On 2 L of nasal cannula oxygen decreased air entry in the right lower lung  Will do 2 view chest x-ray  No obstruction discharge if bed is available at nursing home      Consultations:   [facility] TO CASE MANAGEMENT  IP CONSULT TO [address] [facility],[address], [place]  11/1/2022    [address], [address] [place] [address]. Phone [phone]   Fax: [phone]  Answering Service: [phone]    11/1/2022  11:46 AM      Electronically signed by Mehreen Rojas MD       Please note that this chart was generated using voice recognition Dragon dictation software. Although every effort was made to ensure the accuracy of this automated transcription, some errors in transcription may have occurred.

## 2022-11-01 NOTE — PROGRESS NOTES
BEHAVIORAL HEALTH FOLLOW-UP NOTE     11/1/2022     Patient was seen and examined in person, Chart reviewed   Patient's case discussed with staff/team    Chief Complaint: Aphasia    Interim History:   Noted that the patient has been treated with doses of up to 75 mg at night of Seroquel in the past.    Patient seen at bedside. She is complaining of pain. She is upset that her opioids are being reduced. The patient seems less labile compared to yesterday. She is also able to engage better in the interview. She is oriented to the situation and recalls our meeting yesterday. She denies any suicidal thoughts. Her speech appears to be improving    BP (!) 125/58   Pulse 81   Temp 98.2 °F (36.8 °C) (Oral)   Resp 16   Wt 140 lb 6.9 oz (63.7 kg)   SpO2 98%   BMI 23.37 kg/m²   Appetite:  [x] Normal/Unchanged  [] Increased  [] Decreased      Sleep:       [x] Normal/Unchanged  [] Fair       [] Poor              Energy:    [x] Normal/Unchanged  [] Increased  [] Decreased        Aggression:  [] yes  [x] no    Patient is [] able  [] unable to CONTRACT FOR SAFETY ON THE UNIT    PAST MEDICAL/PSYCHIATRIC HISTORY:   Past Medical History:   Diagnosis Date    Anemia     Bleeding 10/2020    intra-abdominal bleeding -due to splenic mass with GI infiltration. Status post embolization    Cervical cancer (HCC)     Depression     Diabetes mellitus ([address]     GERD (gastroesophageal reflux disease)     Hx of blood clots     Hypertension     Metastatic cancer ([facility] [address] 10/2020    extensive intraabdominal and splenic involvement and lung mets.     Ovarian cancer ([address]     low grade serous ovarian carcinoma    Post chemo evaluation     2007: Chemo via med onc (Dr. Diana Greco), 2008: Gary Anisha due to rising CA-125, 2013: intraperitoneal chemo,12/2015: Ca125 - 25     PRES (posterior reversible encephalopathy syndrome)     Splenic lesion        FAMILY/SOCIAL HISTORY:  Family History   Problem Relation Age of Onset    Alcohol Abuse Mother Cirrhosis Mother      Social History     Socioeconomic History    Marital status: Single     Spouse name: Not on file    Number of children: Not on file    Years of education: Not on file    Highest education level: Not on file   Occupational History    Not on file   Tobacco Use    Smoking status: Every Day     Packs/day: 1.00     Years: 20.00     Pack years: 20.00     Types: Cigarettes    Smokeless tobacco: Never   Vaping Use    Vaping Use: Never used   Substance and Sexual Activity    Alcohol use: Not Currently    Drug use: Never    Sexual activity: Not on file   Other Topics Concern    Not on file   Social History Narrative    Not on file     Social Determinants of Health     Financial Resource Strain: Medium Risk    Difficulty of Paying Living Expenses: Somewhat hard   Food Insecurity: Food Insecurity Present    Worried About Running Out of Food in the Last Year: Often true    Ran Out of Food in the Last Year: Sometimes true   Transportation Needs: Not on file   Physical Activity: Not on file   Stress: Not on file   Social Connections: Not on file   Intimate Partner Violence: Not on file   Housing Stability: Not on file           ROS:  [x] All negative/unchanged except if checked.  Explain positive(checked items) below:  [] Constitutional  [] Eyes  [] Ear/Nose/Mouth/Throat  [] Respiratory  [] CV  [] GI  []   [] Musculoskeletal  [] Skin/Breast  [] Neurological  [] Endocrine  [] Heme/Lymph  [] Allergic/Immunologic    Explanation:     MEDICATIONS:    Current Facility-Administered Medications:     lidocaine 4 % external patch 1 patch, 1 patch, TransDERmal, Daily, Yvon Coronado MD, 1 patch at 11/01/22 0743    QUEtiapine (SEROQUEL) tablet 100 mg, 100 mg, Oral, Nightly, Scott Soriano MD, 100 mg at 10/31/22 2223    LORazepam (ATIVAN) injection 2 mg, 2 mg, IntraVENous, Q5 Min PRN, Delonte Sullivan DO    amLODIPine (NORVASC) tablet 10 mg, 10 mg, Oral, Daily, Edgar Reyez MD, 10 mg at 11/01/22 0742    apixaban (ELIQUIS) tablet 5 mg, 5 mg, Oral, BID, Estephania Perez MD, 5 mg at 11/01/22 0742    benzonatate (TESSALON) capsule 100 mg, 100 mg, Oral, TID PRN, Estephania Perez MD, 100 mg at 10/29/22 2239    lacosamide (VIMPAT) tablet 150 mg, 150 mg, Oral, BID, Estephania Perez MD, 150 mg at 11/01/22 0741    lactobacillus (CULTURELLE) capsule 1 capsule, 1 capsule, Oral, BID, Estephania Perez MD, 1 capsule at 11/01/22 0742    lamoTRIgine (LAMICTAL) tablet 125 mg, 125 mg, Oral, BID, Estephania Perez MD, 125 mg at 11/01/22 0747    levETIRAcetam (KEPPRA) tablet 750 mg, 750 mg, Oral, BID, Estephania Perez MD, 750 mg at 11/01/22 0742    loperamide (IMODIUM) capsule 2 mg, 2 mg, Oral, 4x Daily PRN, Estephania Perez MD, 2 mg at 11/01/22 1336    melatonin tablet 6 mg, 6 mg, Oral, Nightly, Estephania Perez MD, 6 mg at 10/31/22 2222    miconazole (MICOTIN) 2 % powder, , Topical, BID, Estephania Perez MD, Given at 10/31/22 2223    pantoprazole (PROTONIX) tablet 40 mg, 40 mg, Oral, Daily, Estephania Perez MD, 40 mg at 11/01/22 0742    senna (SENOKOT) tablet 8.6 mg, 1 tablet, Oral, BID, Estephania Perez MD, 8.6 mg at 10/31/22 0854    sennosides-docusate sodium (SENOKOT-S) 8.6-50 MG tablet 2 tablet, 2 tablet, Oral, Nightly, Estephania Perez MD, 2 tablet at 10/30/22 2024    sodium chloride flush 0.9 % injection 5-40 mL, 5-40 mL, IntraVENous, PRN, Estephania Perez MD    ondansetron (ZOFRAN-ODT) disintegrating tablet 4 mg, 4 mg, Oral, Q8H PRN, 4 mg at 10/31/22 2311 **OR** ondansetron (ZOFRAN) injection 4 mg, 4 mg, IntraVENous, Q6H PRN, Estephania Perez MD    polyethylene glycol (GLYCOLAX) packet 17 g, 17 g, Oral, Daily PRN, Estephania Perez MD    acetaminophen (TYLENOL) tablet 650 mg, 650 mg, Oral, Q6H PRN, 650 mg at 11/01/22 1336 **OR** acetaminophen (TYLENOL) suppository 650 mg, 650 mg, Rectal, Q6H PRN, Estephania Perez MD    insulin lispro (HUMALOG) injection vial 0-4 Units, 0-4 Units, SubCUTAneous, TID WC, Estephania Perez, MD    insulin lispro (HUMALOG) injection vial 0-4 Units, 0-4 Units, SubCUTAneous, Nightly, Shante Gonzalez MD    glucose chewable tablet 16 g, 4 tablet, Oral, PRN, America Baltazar MD    dextrose bolus 10% 125 mL, 125 mL, IntraVENous, PRN **OR** dextrose bolus 10% 250 mL, 250 mL, IntraVENous, PRN, Shante Gonzalez MD    glucagon (rDNA) injection 1 mg, 1 mg, SubCUTAneous, PRN, America Baltazar MD    dextrose 10 % infusion, , IntraVENous, Continuous PRN, America Baltazar MD    sodium chloride flush 0.9 % injection 10 mL, 10 mL, IntraVENous, PRN, Erling Mcardle, MD, 10 mL at 10/29/22 2110      Examination:  BP (!) 125/58   Pulse 81   Temp 98.2 °F (36.8 °C) (Oral)   Resp 16   Wt 140 lb 6.9 oz (63.7 kg)   SpO2 98%   BMI 23.37 kg/m²   Gait -deferred  Medication side effects(SE): Denies    Mental Status Examination:    Level of consciousness: Alert and awake  Appearance:  good grooming and good hygiene  Behavior/Motor:  no abnormalities noted  Attitude toward examiner:  cooperative and attentive  Speech:  normal rate and normal volume   Mood: Anxious  Affect:  mood congruent  Thought processes:  linear, goal directed, and coherent   Thought content:  Homicidal ideation - none  Suicidal Ideation:  denies suicidal ideation  Delusions:  no evidence of delusions  Perceptual Disturbance:  auditory-describing auditory persistence. States she keeps hearing echoes of stuff people have said.   Cognition:  oriented to person, place, and time   Concentration intact  Insight good   Judgement good     ASSESSMENT:   Patient symptoms are:  [] Well controlled  [x] Improving  [] Worsening  [] No change      Diagnosis:   Principal Problem:    Cerebrovascular accident (CVA) ([facility] [address]  Active Problems:    Cerebrovascular accident (CVA) associated with severe acute respiratory syndrome coronavirus 2 (SARS-CoV-2) infection ([facility] [address]    Difficulty with speech    MDD (major depressive disorder), recurrent severe, without psychosis ([facility] [address] Epithelial ovarian cancer, FIGO stage OSIRIS (HCC)    Anemia    Seizure disorder, status epilepticus, nonconvulsive (HCC)    History of ovarian cancer    Cancer, metastatic to bone [facility])    History of deep venous thrombosis (DVT) of distal vein of left lower extremity: On Eliquis    Diabetes mellitus (HCC)    Gastroesophageal reflux disease    Delirium due to another medical condition    Seizure disorder [facility])  Resolved Problems:    Seizure ([address]      LABS:    Recent Labs     10/30/22  0456 10/31/22  0456 11/01/22  0527   WBC 6.2 5.2 10.1   HGB 11.6* 11.4* 11.3*    248 239     Recent Labs     10/30/22  0456 10/31/22  0456 11/01/22  0527    141 142   K 3.6* 3.3* 3.7    105 107   CO2 25 26 26   BUN 13 9 8   CREATININE 0.68 0.58 0.60   GLUCOSE 82 98 99     Recent Labs     10/29/22  1648   BILITOT 0.3   ALKPHOS 84   AST 15   ALT 6     Lab Results   Component Value Date/Time    BARBSCNU NEGATIVE 02/21/2022 05:58 AM    LABBENZ NEGATIVE 02/21/2022 05:58 AM    LABMETH NEGATIVE 02/21/2022 05:58 AM    PPXUR NOT REPORTED 02/21/2022 05:58 AM     Lab Results   Component Value Date/Time    TSH 2.93 03/09/2021 05:43 PM     No results found for: LITHIUM  No results found for: VALPROATE, CBMZ    RISK ASSESSMENT: Low risk for self-harm    Treatment Plan: Will hold the Seroquel dose at 100 mg nightly (this is lower an approximate equivalent to the dose of olanzapine the patient has been receiving but the patient's symptoms seem to be well controlled at this time)      Risks, benefits, side effects, drug-to-drug interactions and alternatives to treatment were discussed. The patient consented to treatment. PSYCHOTHERAPY/COUNSELING:  [] Therapeutic interview  [x] Supportive  [] CBT  [] Ongoing  [] Other          Christina Borden is a 59 y.o. female being evaluated face to face.     --Yann Santamaria MD on 11/1/2022 at 2:23 PM    An electronic signature was used to authenticate this note.

## 2022-11-01 NOTE — DISCHARGE SUMMARY
[place] [address] [facility]    Discharge Summary     Patient ID: Shyanne Hernandez  :  1965   MRN: [id number]     ACCOUNT:  [de-identified]   Patient's PCP: Sylwia Sanchez DO  Admit Date: 10/29/2022   Discharge Date: 2022    Length of Stay: 3  Code Status:  Full Code  Admitting Physician: Sandy Chauhan MD  Discharge Physician: Ronan Neumann MD     Active Discharge Diagnoses:     Primary Problem  Cerebrovascular accident (CVA) [facility])      [facility]    Diagnosis Date Noted    Difficulty with speech [R47.9] 10/30/2022     Priority: Medium    MDD (major depressive disorder), recurrent severe, without psychosis ([facility] [F33.2] 10/30/2022     Priority: Medium    Cerebrovascular accident (CVA) ([facility] [address] [I63.9] 10/29/2022     Priority: Medium    Cerebrovascular accident (CVA) associated with severe acute respiratory syndrome coronavirus 2 (SARS-CoV-2) infection ([facility] [address] [U07.1, I63.9] 10/29/2022     Priority: Medium    Seizure disorder ([facility] [address] [G40.909]     Delirium due to another medical condition [F05]     Diabetes mellitus ([address] [E11.9] 2022    Gastroesophageal reflux disease [K21.9] 2022    History of deep venous thrombosis (DVT) of distal vein of left lower extremity: On Eliquis [Z86.718] 2021    Cancer, metastatic to bone ([facility] [address] [C79.51] 2021    History of ovarian cancer [Z85.43]     Seizure disorder, status epilepticus, nonconvulsive ([facility] [address] [G40.901] 2021    Anemia [D64.9]     Epithelial ovarian cancer, FIGO stage OSIRIS ([address] [C56.9] 2020       Admission Condition:  fair     Discharged Condition: fair    Hospital Stay:     Hospital Course:  Shyanne Hernandez is a 59 y.o. female who was admitted for the management of Cerebrovascular accident (CVA) ([facility] [address] , presented to ER with Altered Mental Status  53-year-old lady with a history of a seizure disorder history of ovarian cancer under treatment history of press syndrome she was on Vimpat Lamictal and Keppra at home but was not taking Vimpat  Patient admitted with a questionable change in mental status auditory hallucination  Later on on further questioning with the neurologist it was diagnosed that patient has a cortical stimulation from possible seizure due to lack of Vimpat and auditory persistence related to cortical limitation was the reason and not hallucination  Vimpat is restarted psychiatric input noted medications adjusted Zyprexa discontinued Seroquel  Patient being discharged advised for outpatient follow-up with neurology    Significant therapeutic interventions:     Significant Diagnostic Studies:   Labs / Micro:        ,     Radiology:    CT HEAD WO CONTRAST    Result Date: 10/29/2022  EXAMINATION: CT OF THE HEAD WITHOUT CONTRAST  10/29/2022 3:02 pm TECHNIQUE: CT of the head was performed without the administration of intravenous contrast. Automated exposure control, iterative reconstruction, and/or weight based adjustment of the mA/kV was utilized to reduce the radiation dose to as low as reasonably achievable. COMPARISON: MRI brain October 9, 2022 HISTORY: ORDERING SYSTEM PROVIDED HISTORY: Stroke Symptoms TECHNOLOGIST PROVIDED HISTORY: Stroke Symptoms Decision Support Exception - unselect if not a suspected or confirmed emergency medical condition->Emergency Medical Condition (MA) Reason for Exam: patient here with ams, aphasia and slurred speech FINDINGS: BRAIN/VENTRICLES: There are small old infarctions in the right occipital lobe and left frontal, stable. There is periventricular white matter low attenuation, likely related to minimal chronic microvascular disease. There is no acute intracranial hemorrhage, mass effect or midline shift. No abnormal extra-axial fluid collection. The gray-white differentiation is maintained without evidence of an acute infarct. There is no evidence of hydrocephalus.  ORBITS: The visualized portion of the orbits demonstrate no acute abnormality. SINUSES: The visualized paranasal sinuses and mastoid air cells demonstrate no acute abnormality. SOFT TISSUES/SKULL:  No acute abnormality of the visualized skull or soft tissues. No acute intracranial abnormality. 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 Minimal chronic microvascular disease. CT HEAD WO CONTRAST    Result Date: 10/3/2022  EXAMINATION: CTA OF THE HEAD AND NECK WITH CONTRAST; CT OF THE HEAD WITHOUT CONTRAST 10/3/2022 3:10 pm; 10/3/2022 3:30 pm: TECHNIQUE: CTA of the head and neck was performed with the administration of intravenous contrast. Multiplanar reformatted images are provided for review. MIP images are provided for review. Stenosis of the internal carotid arteries measured using NASCET criteria. Automated exposure control, iterative reconstruction, and/or weight based adjustment of the mA/kV was utilized to reduce the radiation dose to as low as reasonably achievable.; CT of the head was performed without the administration of intravenous contrast. Automated exposure control, iterative reconstruction, and/or weight based adjustment of the mA/kV was utilized to reduce the radiation dose to as low as reasonably achievable. Noncontrast CT of the head with reconstructed 2-D images are also provided for review. COMPARISON: MRI brain performed 02/24/2022. HISTORY: ORDERING SYSTEM PROVIDED HISTORY: AMS TECHNOLOGIST PROVIDED HISTORY: AMS Decision Support Exception - unselect if not a suspected or confirmed emergency medical condition->Emergency Medical Condition (MA); ORDERING SYSTEM PROVIDED HISTORY: AMS TECHNOLOGIST PROVIDED HISTORY: AMS Decision Support Exception - unselect if not a suspected or confirmed emergency medical condition->Emergency Medical Condition (MA) FINDINGS: CT HEAD: BRAIN/VENTRICLES:  There is no acute intracranial hemorrhage, mass effect, or midline shift.   There is satisfactory overall gray-white matter differentiation. The ventricular structures are symmetric and unremarkable. The infratentorial structures are unremarkable. ORBITS: The visualized portion of the orbits demonstrate no acute abnormality. SINUSES:  The visualized paranasal sinuses and mastoid air cells demonstrate no acute abnormality. SOFT TISSUES/SKULL: No acute abnormality of the visualized skull or soft tissues. CTA NECK: AORTIC ARCH/ARCH VESSELS: No dissection or arterial injury. No significant stenosis of the brachiocephalic or subclavian arteries. CAROTID ARTERIES: No dissection, arterial injury, or hemodynamically significant stenosis by NASCET criteria. VERTEBRAL ARTERIES: No dissection, arterial injury, or significant stenosis. SOFT TISSUES: There is consolidation in the visualized lungs bilaterally that is worse on the right compared to the left. No cervical or superior mediastinal lymphadenopathy. The larynx and pharynx are unremarkable. No acute abnormality of the salivary and thyroid glands. BONES: No acute osseous abnormality. CTA HEAD: ANTERIOR CIRCULATION: No significant stenosis of the intracranial internal carotid, anterior cerebral, or middle cerebral arteries. No aneurysm. POSTERIOR CIRCULATION: No significant stenosis of the vertebral, basilar, or posterior cerebral arteries. No aneurysm. OTHER: No dural venous sinus thrombosis on this non-dedicated study. 1. No acute intracranial abnormality. 2. No evidence for significant stenosis or occlusion. 3. Consolidation in the visualized lungs bilaterally that is worse on the right compared to the left. Correlate with report from dedicated CT chest, abdomen, and pelvis for further discussion.      XR CHEST PORTABLE    Result Date: 10/29/2022  EXAMINATION: ONE XRAY VIEW OF THE CHEST 10/29/2022 3:35 pm COMPARISON: 10/05/2022 HISTORY: ORDERING SYSTEM PROVIDED HISTORY: stroke symptoms TECHNOLOGIST PROVIDED HISTORY: stroke symptoms FINDINGS: Right chest port remains in place. Heart size is grossly stable. There is elevation of the right diaphragm, which is unchanged. Scattered interstitial thickening in the lungs is not significantly changed. No evidence of pneumothorax, new airspace consolidation, or sizable pleural effusion. Possible tiny pleural effusions, similar to the previous study. No free air beneath the diaphragm. Visualized loops of bowel in the upper abdomen appear mildly distended with gas. Vascular coil noted in the visualized upper abdomen. 1.  Possible tiny pleural effusions and scattered interstitial opacities in the lungs, similar to 10/05/2022. No new airspace consolidation. 2.  Visualized loops of bowel in the upper abdomen appear mildly distended with gas. Consider abdominal radiographs. XR CHEST PORTABLE    Result Date: 10/5/2022  EXAMINATION: ONE XRAY VIEW OF THE CHEST 10/5/2022 6:28 am COMPARISON: 10/03/2022, 06/03/2022 HISTORY: ORDERING SYSTEM PROVIDED HISTORY: Wheezing TECHNOLOGIST PROVIDED HISTORY: Wheezing FINDINGS: Endotracheal and enteric tubes have been removed. Right internal jugular port remains. Interstitial opacities appear more prominent in the interval. Patchy opacities in the lung bases again demonstrated. No focal area of consolidation, pneumothorax or effusion. Interval extubation. Interstitial opacities have increased in the interval, for which developing edema should be considered. XR CHEST PORTABLE    Result Date: 10/3/2022  EXAMINATION: ONE XRAY VIEW OF THE CHEST 10/3/2022 1:54 pm COMPARISON: Chest radiograph 06/03/2022. HISTORY: ORDERING SYSTEM PROVIDED HISTORY: AMS TECHNOLOGIST PROVIDED HISTORY: AMS FINDINGS: Single view provided. Right-sided subcutaneous medical infusion port with catheter tip in the distal superior vena cava. Endotracheal tube overlies tracheal air column with the tip 5 cm above the alejandro.   Enteric tube follows the course of the esophagus crossing the GE junction and extending into the stomach with the tip at the level of the proximal to mid gastric body. Stable mediastinal and cardiac silhouettes with aortic atherosclerosis. Stable right hemidiaphragm elevation. Progressive right lung base curvilinear opacity. Stable bilateral perihilar interstitial reticulonodular densities. No lobar consolidation. No effusion or pneumothorax. No free subdiaphragmatic air. Stable embolization coils of the left upper quadrant. Redemonstration of cholelithiasis. 1. Endotracheal tube tip lies 5 cm above the alejandro. 2. Enteric tube in the stomach with the tip at the level of the proximal to mid gastric body. 3. Stable right mid I a frame elevation resulting in right hemithorax volume loss. 4. There is progressive curvilinear right lung base opacity which may represent progressive atelectasis. 5. Stable perihilar interstitial reticulonodular densities. No acute consolidation or pulmonary edema. CTA HEAD NECK W CONTRAST    Result Date: 10/29/2022  EXAMINATION: CTA OF THE HEAD AND NECK WITH CONTRAST 10/29/2022 3:08 pm: TECHNIQUE: CTA of the head and neck was performed with the administration of intravenous contrast. Multiplanar reformatted images are provided for review. MIP images are provided for review. Stenosis of the internal carotid arteries measured using NASCET criteria. Automated exposure control, iterative reconstruction, and/or weight based adjustment of the mA/kV was utilized to reduce the radiation dose to as low as reasonably achievable.  COMPARISON: Noncontrast CT head from earlier today, CTA head and neck October 3, 2022, CT chest abdomen pelvis January 5, 2021 HISTORY: ORDERING SYSTEM PROVIDED HISTORY: Stroke Symptoms TECHNOLOGIST PROVIDED HISTORY: Stroke Symptoms Decision Support Exception - unselect if not a suspected or confirmed emergency medical condition->Emergency Medical Condition (MA) Reason for Exam: patient here for ams, aphasia and slurred speech FINDINGS: CTA NECK: AORTIC ARCH/ARCH VESSELS: No dissection or arterial injury. No significant stenosis of the brachiocephalic or subclavian arteries. CAROTID ARTERIES: No dissection, arterial injury, or hemodynamically significant stenosis by NASCET criteria. VERTEBRAL ARTERIES: No dissection, arterial injury, or significant stenosis. SOFT TISSUES: There is a 4.6 mm lung nodule in the right upper lobe (series 6, image 14). There is a 6.3 mm lung nodule in the right upper lobe (series 6, image 31). There are multiple smaller lung nodules in the bilateral lungs. There are mildly prominent mediastinal lymph nodes, may be related to infection/inflammation. The larynx and pharynx are unremarkable. No acute abnormality of the salivary and thyroid glands. BONES: No acute osseous abnormality. CTA HEAD: ANTERIOR CIRCULATION: No significant stenosis of the intracranial internal carotid, anterior cerebral, or middle cerebral arteries. There is 1.6 mm prominent infundibulum versus saccular aneurysm at the origin of the left posterior communicating artery (series 5, image 49). POSTERIOR CIRCULATION: No significant stenosis of the vertebral, basilar, or posterior cerebral arteries. No aneurysm. OTHER: Limited evaluation of the intracranial dural venous sinuses secondary to suboptimal phase of contrast. BRAIN: No mass effect or midline shift. No extra-axial fluid collection. The gray-white differentiation is maintained. No acute abnormality or flow-limiting stenosis of the major arteries of the head and neck. 1.6 mm prominent infundibulum versus saccular aneurysm at the origin of the left posterior communicating artery Lung nodules measuring up to 6.3 mm, likely related to infection/inflammation, increased since January 5, 2021. Follow-up is recommended to exclude metastasis. Mildly prominent mediastinal lymph nodes, may be related to infection/inflammation, increased since January 5, 2021.   Follow-up is recommended to exclude lymph node metastasis. CTA HEAD NECK W CONTRAST    Result Date: 10/3/2022  EXAMINATION: CTA OF THE HEAD AND NECK WITH CONTRAST; CT OF THE HEAD WITHOUT CONTRAST 10/3/2022 3:10 pm; 10/3/2022 3:30 pm: TECHNIQUE: CTA of the head and neck was performed with the administration of intravenous contrast. Multiplanar reformatted images are provided for review. MIP images are provided for review. Stenosis of the internal carotid arteries measured using NASCET criteria. Automated exposure control, iterative reconstruction, and/or weight based adjustment of the mA/kV was utilized to reduce the radiation dose to as low as reasonably achievable.; CT of the head was performed without the administration of intravenous contrast. Automated exposure control, iterative reconstruction, and/or weight based adjustment of the mA/kV was utilized to reduce the radiation dose to as low as reasonably achievable. Noncontrast CT of the head with reconstructed 2-D images are also provided for review. COMPARISON: MRI brain performed 02/24/2022. HISTORY: ORDERING SYSTEM PROVIDED HISTORY: AMS TECHNOLOGIST PROVIDED HISTORY: AMS Decision Support Exception - unselect if not a suspected or confirmed emergency medical condition->Emergency Medical Condition (MA); ORDERING SYSTEM PROVIDED HISTORY: AMS TECHNOLOGIST PROVIDED HISTORY: AMS Decision Support Exception - unselect if not a suspected or confirmed emergency medical condition->Emergency Medical Condition (MA) FINDINGS: CT HEAD: BRAIN/VENTRICLES:  There is no acute intracranial hemorrhage, mass effect, or midline shift. There is satisfactory overall gray-white matter differentiation. The ventricular structures are symmetric and unremarkable. The infratentorial structures are unremarkable. ORBITS: The visualized portion of the orbits demonstrate no acute abnormality. SINUSES:  The visualized paranasal sinuses and mastoid air cells demonstrate no acute abnormality.  SOFT TISSUES/SKULL: No acute abnormality of the visualized skull or soft tissues. CTA NECK: AORTIC ARCH/ARCH VESSELS: No dissection or arterial injury. No significant stenosis of the brachiocephalic or subclavian arteries. CAROTID ARTERIES: No dissection, arterial injury, or hemodynamically significant stenosis by NASCET criteria. VERTEBRAL ARTERIES: No dissection, arterial injury, or significant stenosis. SOFT TISSUES: There is consolidation in the visualized lungs bilaterally that is worse on the right compared to the left. No cervical or superior mediastinal lymphadenopathy. The larynx and pharynx are unremarkable. No acute abnormality of the salivary and thyroid glands. BONES: No acute osseous abnormality. CTA HEAD: ANTERIOR CIRCULATION: No significant stenosis of the intracranial internal carotid, anterior cerebral, or middle cerebral arteries. No aneurysm. POSTERIOR CIRCULATION: No significant stenosis of the vertebral, basilar, or posterior cerebral arteries. No aneurysm. OTHER: No dural venous sinus thrombosis on this non-dedicated study. 1. No acute intracranial abnormality. 2. No evidence for significant stenosis or occlusion. 3. Consolidation in the visualized lungs bilaterally that is worse on the right compared to the left. Correlate with report from dedicated CT chest, abdomen, and pelvis for further discussion. CT CHEST ABDOMEN PELVIS W CONTRAST    Result Date: 10/3/2022  EXAMINATION: CT OF THE CHEST, ABDOMEN, AND PELVIS WITH CONTRAST 10/3/2022 3:10 pm TECHNIQUE: CT of the chest, abdomen and pelvis was performed with the administration of intravenous contrast. Multiplanar reformatted images are provided for review. Automated exposure control, iterative reconstruction, and/or weight based adjustment of the mA/kV was utilized to reduce the radiation dose to as low as reasonably achievable. COMPARISON: Chest radiograph 10/03/2022. CT abdomen/pelvis 06/03/2022. CT PE study 02/20/2022.  HISTORY: ORDERING SYSTEM PROVIDED HISTORY: concerns for mets or bleed TECHNOLOGIST PROVIDED HISTORY: concerns for mets or bleed History of ovarian cancer. FINDINGS: Mediastinum: Normal heart size. There is a mild dependent pericardial effusion which measures 1.2 cm in thickness and is improved compared to the prior exam.  Right-sided subcutaneous medical infusion port with catheter tip in the right atrium. The pulmonary arteries are normal in size. Adequate contrast opacification to the segmental level with no acute intraluminal filling defect. Mild coronary artery calcifications. Mild thoracic aortic atherosclerotic calcifications with no aneurysmal dilatation or evidence of dissection. The esophagus is decompressed. There is an enteric tube within the esophagus which passes into the stomach. Multifocal mediastinal lymphadenopathy with a right paratracheal lymph node measuring 1.0 x 1.5 cm on axial image 39, this is stable compared to the prior exam.  Stable multiple enlarged lateral aortic recess lymph nodes. Precarinal lymph node on axial image 53 measuring 1.3 x 1.8 cm, stable compared to the prior exam. Stable bilateral symmetric mild enlarged hilar lymph nodes consistent with lymphadenopathy. Lungs/pleura: The endotracheal tube is in the trachea with the tip approximately 3 cm above the alejandro. The trachea is patent. The endobronchial tree is patent. No pneumothorax or effusion. Innumerable bilateral 0.2-0.4 cm solid pulmonary nodules, there is 1 in the lateral left upper lobe which demonstrates central lucency consistent with necrosis. Stable left lower lobe axial interstitial soft tissue thickening with a oval 2.0 x 2.6 cm mass on axial image 78 which is stable compared to the prior exam.  Progressive right lower lobe volume loss and peribronchial ground-glass opacity and consolidation. Progressive right lung base volume loss and peribronchial consolidation. No lobar consolidation.  Abdomen: Streak artifact related to splenic vascular embolization coils result in significant beam streak artifact and limit evaluation of the left hemiabdomen. No intrahepatic mass or biliary dilatation. Redemonstration of cholelithiasis with no wall thickening or adjacent inflammation. The common bile duct and pancreas demonstrate no acute abnormality. Splenic hilar and inferior pole focal atrophy and coarse calcifications which appear stable compared to the prior exam and may represent treated metastatic lesions. There is stable adjacent pleural, chest, and abdominal wall thickening and perisplenic fat induration. The adrenals and kidneys demonstrate no acute abnormality. Abdominal aortic atherosclerosis with no aneurysmal dilatation. Stomach is decompressed with mild mucosal fold thickening. The enteric tube is in the stomach with the tip extending to the mid gastric body. Small bowel and appendix are unremarkable. Left hemiabdomen subcutaneous medical infusion port with catheter entering the peritoneal cavity in the left paramedian anterior pelvis with a catheter extending to the posterosuperior pelvis. No ascites or free intraperitoneal air. The ascending colon demonstrates stable mild wall thickening without distention. The transverse and descending colon demonstrate moderate distention with liquid air stool levels and diffuse mild wall thickening which appears slightly improved compared to the prior exam.  No evidence of pneumatosis. There is abrupt transition with decompressed distal sigmoid colon to the level of the rectum. This appears similar to the prior exam. Left periaortic enlarged lymph nodes with associated coarse calcifications on axial image 91 a lymph node demonstrates interval increase in size measuring 2.2 x 2.3 cm compared to 1.6 x 1.8 cm. There is new adjacent fat induration. There are numerous other retroperitoneal left periaortic lymph nodes which appear stable in size.  Pelvis: A Mercado catheter is within the bladder transition in the mid sigmoid colon which may relate to malignant partial obstruction or stricture. Mild interval improvement of colonic edema. No evidence of colonic perforation or pneumatosis. MRI BRAIN W WO CONTRAST    Result Date: 10/29/2022  EXAMINATION: MRI OF THE BRAIN WITHOUT AND WITH CONTRAST  10/29/2022 9:01 pm TECHNIQUE: Multiplanar multisequence MRI of the head/brain was performed without and with the administration of intravenous contrast. COMPARISON: 10/29/2022 HISTORY: ORDERING SYSTEM PROVIDED HISTORY: AMS TECHNOLOGIST PROVIDED HISTORY: AMS Reason for Exam: AMS FINDINGS: Motion artifact challenge evaluation degrades image quality. INTRACRANIAL STRUCTURES/VENTRICLES:  There is no acute infarct. Mild involutional change brain parenchyma identified prominent ventricles sulci. Mild periventricular subcortical T2 prolongation identified suggestive chronic small vessel disease. Evidence of remote encephalomalacia identified right occipital and posterior temporal lobe. Mild gliosis identified right frontal deep white matter. No mass effect or midline shift. No evidence of an acute intracranial hemorrhage. The ventricles and sulci are normal in size and configuration. The sellar/suprasellar regions appear unremarkable. The normal signal voids within the major intracranial vessels appear maintained. No abnormal focus of enhancement is seen within the brain. ORBITS: The visualized portion of the orbits demonstrate no acute abnormality. SINUSES: The visualized paranasal sinuses and mastoid air cells demonstrate no acute abnormality. BONES/SOFT TISSUES: The bone marrow signal intensity appears normal. The soft tissues demonstrate no acute abnormality. No acute stroke, midline shift mass effect. Chronic microvascular disease. Right sided posterior temporal and occipital lobe encephalomalacia.      MRI BRAIN W WO CONTRAST    Result Date: 10/9/2022  EXAMINATION: MRI OF THE BRAIN WITHOUT AND WITH CONTRAST  10/9/2022 9:35 am TECHNIQUE: Multiplanar multisequence MRI of the head/brain was performed without and with the administration of intravenous contrast. COMPARISON: Brain MRI done 10/04/2022 and 02/24/2022. HISTORY: ORDERING SYSTEM PROVIDED HISTORY: revaluation of parenchyma eveluate for PRES or other lesions TECHNOLOGIST PROVIDED HISTORY: revaluation of parenchyma eveluate for PRES or other lesions Reason for Exam: revaluation of parenchyma, PRES or other lesions FINDINGS: INTRACRANIAL STRUCTURES/VENTRICLES: Patchy cortical/subcortical FLAIR hyperintense signal in the right greater than left occipital lobes is similar to the brain MRI done October 4, 2022 and 02/24/2022. Small focal area of cortical/subcortical FLAIR hyperintense signal in the left posterior frontal lobe is similar compared to brain MRI done October 4, 2022 but new compared to brain MRI done 02/24/2022. These areas of FLAIR hyperintense signal demonstrate no correlating postcontrast enhancement, diffusion restriction or gradient blooming. There is no acute infarct. No mass effect or midline shift. No evidence of an acute intracranial hemorrhage. Generalized cerebral and cerebellar volume loss. No deonte hydrocephalus. The sellar/suprasellar regions appear unremarkable. The normal signal voids within the major intracranial vessels appear maintained. No abnormal focus of enhancement is seen within the brain. The axial FLAIR images demonstrate mild bilateral periventricular and deep white matter signal hyperintensities which are nonspecific but commonly seen in the setting of chronic small vessel ischemic disease. ORBITS: The visualized portion of the orbits demonstrate no acute abnormality. SINUSES: The visualized paranasal sinuses and mastoid air cells demonstrate no acute abnormality. BONES/SOFT TISSUES: The bone marrow signal intensity appears normal. The soft tissues demonstrate no acute abnormality.      1.  Overall, findings are similar compared to brain MRI done October 4, 2022 with redemonstration of patchy areas of FLAIR hyperintense signal in the left posterior frontal lobe and right greater than left occipital lobes which may represent small old infarcts. 2.  No acute stroke, intracranial hemorrhage or abnormal enhancement. 3.  Mild chronic small vessel ischemic disease. MRI BRAIN WO CONTRAST    Result Date: 10/4/2022  EXAMINATION: MRI OF THE BRAIN WITHOUT CONTRAST  10/4/2022 3:55 pm TECHNIQUE: Multiplanar multisequence MRI of the brain was performed without the administration of intravenous contrast. COMPARISON: CT head 1 day prior. MRI brain 02/24/2022 HISTORY: ORDERING SYSTEM PROVIDED HISTORY: encephalopathy, hx of CA, hx of prior PRES with subsequent seizure disorder TECHNOLOGIST PROVIDED HISTORY: encephalopathy, hx of CA, hx of prior PRES with subsequent seizure disorder Reason for Exam: Altered Mental Status - encephalopathy, hx of CA, hx of prior PRES with subsequent seizure disorder FINDINGS: Motion limited evaluation. INTRACRANIAL STRUCTURES/VENTRICLES: No diffusion restriction to suggest acute infarct. Subtle small focus of gas that subtle new small focus of cortical and subcortical T2/FLAIR hyperintensity involving the lateral left frontal lobe best seen on FLAIR image 21. Stable small foci of right occipital encephalomalacia with mild surrounding gliosis. No mass effect or midline shift. No evidence of an acute intracranial hemorrhage. The ventricles and sulci are normal in size and configuration. The sellar/suprasellar regions appear unremarkable. The normal signal voids within the major intracranial vessels appear maintained. ORBITS: The visualized portion of the orbits demonstrate no acute abnormality. SINUSES: The visualized paranasal sinuses and mastoid air cells demonstrate no acute abnormality.  BONES/SOFT TISSUES: The bone marrow signal intensity appears normal. The soft tissues demonstrate no acute abnormality. 1. New subtle focus of T2/FLAIR hyperintensity in the lateral left frontal cortex and subcortical white matter is nonspecific and may reflect sequela of prior ischemia or mild posterior reversal encephalopathy syndrome. Short interval follow-up is recommended. 2. No evidence of acute infarct or intracranial hemorrhage. 3. Stable small foci of right occipital encephalomalacia in keeping with sequela of prior insult. Consultations:    Consults:     Final Specialist Recommendations/Findings:   IP CONSULT TO PHARMACY  PHARMACY TO CHANGE BASE FLUIDS  IP CONSULT TO PRIMARY CARE PROVIDER  IP CONSULT TO NEUROLOGY  IP CONSULT TO CASE MANAGEMENT  IP CONSULT TO PSYCHIATRY      The patient was seen and examined on day of discharge and this discharge summary is in conjunction with any daily progress note from day of discharge.     Discharge plan:     Disposition: Home    Physician Follow Up:     [address] [id number] [phone]         Requiring Further Evaluation/Follow Up POST HOSPITALIZATION/Incidental Findings:    Diet: cardiac diet    Activity: As tolerated    Instructions to Patient:     Discharge Medications:      Medication List        START taking these medications      QUEtiapine 100 MG tablet  Commonly known as: SEROQUEL  Take 1 tablet by mouth nightly            CHANGE how you take these medications      levETIRAcetam 750 MG tablet  Commonly known as: KEPPRA  Take 1 tablet by mouth 2 times daily  What changed: additional instructions            CONTINUE taking these medications      acidophilus Caps capsule  TAKE 1 TABLET BY MOUTH ONCE DAILY IN THE MORNING AND 1 TABLET BEFORE BEDTIME     amLODIPine 10 MG tablet  Commonly known as: NORVASC  Take 1 tablet by mouth daily     apixaban 5 MG Tabs tablet  Commonly known as: Eliquis  Take 1 tablet by mouth 2 times daily     benzonatate 100 MG capsule  Commonly known as: Tessalon Perles  Take 1 capsule by mouth 3 times daily as needed for Cough     dicyclomine 20 MG tablet  Commonly known as: BENTYL  TAKE 1 TABLET (20 MG TOTAL) BY MOUTH IN THE MORNING AND 1 TABLET (20 MG TOTAL) BEFORE BEDTIME. FeroSul 325 (65 Fe) MG tablet  Generic drug: ferrous sulfate  TAKE 1 TABLET BY MOUTH ONCE DAILY WITH BREAKFAST     Handicap Placard Misc  5 years     hydrocortisone 2.5 % Crea rectal cream  Commonly known as: ANUSOL-HC  Apply on affected area twice daily     lacosamide 150 MG Tabs tablet  Commonly known as: VIMPAT  Take 1 tablet by mouth 2 times daily. lamoTRIgine 25 MG tablet  Commonly known as: LAMICTAL  Take 5 tablets by mouth 2 times daily     loperamide 2 MG capsule  Commonly known as: IMODIUM     melatonin 5 MG Tabs tablet  Take 1 tablet by mouth daily     metoprolol tartrate 25 MG tablet  Commonly known as: LOPRESSOR  Take 1 tablet by mouth 2 times daily     ondansetron 4 MG disintegrating tablet  Commonly known as: ZOFRAN-ODT  Take 1 tablet by mouth every 8 hours as needed for Nausea or Vomiting     pantoprazole 40 MG tablet  Commonly known as: PROTONIX  Take 1 tablet by mouth daily     senna 8.6 MG tablet  Commonly known as: Senokot  Take 1 tablet by mouth 2 times daily     sertraline 100 MG tablet  Commonly known as: ZOLOFT  TAKE 1 TABLET BY MOUTH DAILY     Stimulant Laxative 8.6-50 MG per tablet  Generic drug: senna-docusate  TAKE 2 TABLETS BY MOUTH NIGHTLY. Zeasorb-AF 2 % powder  Generic drug: miconazole  Apply topically 2 times daily.             STOP taking these medications      OLANZapine 5 MG tablet  Commonly known as: ZYPREXA               Where to Get Your Medications        These medications were sent to [address] [address]  [address] [facility] [address]      Phone: [phone]   QUEtiapine 100 MG tablet       You can get these medications from any pharmacy    Bring a paper prescription for each of these medications  lacosamide 150 MG Tabs tablet         Time Spent on discharge is  35 mins in patient examination, evaluation, counseling as well as medication reconciliation, prescriptions for required medications, discharge plan and follow up. Electronically signed by   Farzaneh Corado MD  11/1/2022  1:17 PM      Thank you Dr. Jose Khan DO for the opportunity to be involved in this patient's care.

## 2022-11-02 ENCOUNTER — HOSPITAL ENCOUNTER (OUTPATIENT)
Facility: MEDICAL CENTER | Age: 57
End: 2022-11-02
Payer: COMMERCIAL

## 2022-11-02 ENCOUNTER — TELEPHONE (OUTPATIENT)
Dept: ONCOLOGY | Age: 57
End: 2022-11-02

## 2022-11-02 NOTE — PROGRESS NOTES
Physician Progress Note      Aye Levy  [facility] #:                  [id number]  :                       1965  ADMIT DATE:       10/29/2022 2:36 PM  [address] DATE:        2022 6:30 PM  RESPONDING  PROVIDER #:        Andrew Cottrell MD          QUERY TEXT:    Patient admitted with garbled speech. Noted documentation of acute CVA in H/P   and IM progress notes. In order to support the diagnosis of acute CVA, please   include additional clinical indicators in your documentation. Or please   document if the diagnosis of Acute CVA has been ruled out after further study. 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 Seizure disorder. This may be on the basis of cortical irritation;  Treatment: Neuro consult, MRI BRAIN, CT HEAD, PO Vimpat, PO Keppra, PO   Lamictal; monitoring, hospital admission  Options provided:  -- Acute CVA was ruled out  -- Acute CVA present as evidenced by, Please document evidence. -- Other - I will add my own diagnosis  -- Disagree - Not applicable / Not valid  -- Disagree - Clinically unable to determine / Unknown  -- Refer to Clinical Documentation Reviewer    PROVIDER RESPONSE TEXT:    Acute CVA was ruled out after study.     Query created by: Cassy Kapoor on 2022 4:07 PM      Electronically signed by:  Andrew Cottrell MD 2022 9:01 AM

## 2022-11-02 NOTE — TELEPHONE ENCOUNTER
PT CALLING FROM A REHABILITATION FACILITY FOR PAIN MEDICATION AND ATIVAN. [address] [facility] ON 11/1/22, AND NOTIFIED PT NEEDS TO NOTIFY MD OF THE FACILITY, THAT SHE NEEDS HER HOME MEDS PRESCRIBED WHILE SHE IS THERE. PT STATE UNDERSTANDING.

## 2022-11-03 ENCOUNTER — HOSPITAL ENCOUNTER (OUTPATIENT)
Dept: INFUSION THERAPY | Facility: MEDICAL CENTER | Age: 57
Discharge: HOME | End: 2022-11-03
Payer: COMMERCIAL

## 2022-11-03 VITALS
TEMPERATURE: 98.5 F | DIASTOLIC BLOOD PRESSURE: 74 MMHG | HEART RATE: 66 BPM | RESPIRATION RATE: 16 BRPM | SYSTOLIC BLOOD PRESSURE: 118 MMHG

## 2022-11-03 DIAGNOSIS — C56.9 MALIGNANT NEOPLASM OF OVARY, UNSPECIFIED LATERALITY (HCC): ICD-10-CM

## 2022-11-03 DIAGNOSIS — C79.51 CANCER, METASTATIC TO BONE (HCC): Primary | ICD-10-CM

## 2022-11-03 DIAGNOSIS — C56.9: ICD-10-CM

## 2022-11-03 DIAGNOSIS — Z85.43 HISTORY OF OVARIAN CANCER: ICD-10-CM

## 2022-11-03 LAB
ABSOLUTE EOS #: 0.17 K/UL (ref 0–0.44)
ABSOLUTE IMMATURE GRANULOCYTE: 0.03 K/UL (ref 0–0.3)
ABSOLUTE LYMPH #: 1.33 K/UL (ref 1.1–3.7)
ABSOLUTE MONO #: 0.54 K/UL (ref 0.1–1.2)
ALBUMIN SERPL-MCNC: 4.1 G/DL (ref 3.5–5.2)
ALP BLD-CCNC: 81 U/L (ref 35–104)
ALT SERPL-CCNC: 5 U/L (ref 5–33)
ANION GAP SERPL CALCULATED.3IONS-SCNC: 12 MMOL/L (ref 9–17)
AST SERPL-CCNC: 9 U/L
BASOPHILS # BLD: 1 % (ref 0–2)
BASOPHILS ABSOLUTE: 0.06 K/UL (ref 0–0.2)
BILIRUB SERPL-MCNC: 0.2 MG/DL (ref 0.3–1.2)
BUN BLDV-MCNC: 8 MG/DL (ref 6–20)
BUN/CREAT BLD: 13 (ref 9–20)
CA 125: 374 U/ML
CALCIUM SERPL-MCNC: 9.2 MG/DL (ref 8.6–10.4)
CHLORIDE BLD-SCNC: 105 MMOL/L (ref 98–107)
CO2: 25 MMOL/L (ref 20–31)
CREAT SERPL-MCNC: 0.63 MG/DL (ref 0.5–0.9)
EOSINOPHILS RELATIVE PERCENT: 2 % (ref 1–4)
GFR SERPL CREATININE-BSD FRML MDRD: >60 ML/MIN/1.73M2
GLUCOSE BLD-MCNC: 111 MG/DL (ref 70–99)
HCT VFR BLD CALC: 40.6 % (ref 36.3–47.1)
HEMOGLOBIN: 12.5 G/DL (ref 11.9–15.1)
IMMATURE GRANULOCYTES: 0 %
LYMPHOCYTES # BLD: 17 % (ref 24–43)
MCH RBC QN AUTO: 30 PG (ref 25.2–33.5)
MCHC RBC AUTO-ENTMCNC: 30.8 G/DL (ref 28.4–34.8)
MCV RBC AUTO: 97.4 FL (ref 82.6–102.9)
MONOCYTES # BLD: 7 % (ref 3–12)
NRBC AUTOMATED: 0 PER 100 WBC
PDW BLD-RTO: 15.9 % (ref 11.8–14.4)
PLATELET # BLD: 297 K/UL (ref 138–453)
PMV BLD AUTO: 9.3 FL (ref 8.1–13.5)
POTASSIUM SERPL-SCNC: 3.6 MMOL/L (ref 3.7–5.3)
RBC # BLD: 4.17 M/UL (ref 3.95–5.11)
RBC # BLD: ABNORMAL 10*6/UL
SEG NEUTROPHILS: 73 % (ref 36–65)
SEGMENTED NEUTROPHILS ABSOLUTE COUNT: 5.81 K/UL (ref 1.5–8.1)
SODIUM BLD-SCNC: 142 MMOL/L (ref 135–144)
TOTAL PROTEIN: 7.2 G/DL (ref 6.4–8.3)
WBC # BLD: 7.9 K/UL (ref 3.5–11.3)

## 2022-11-03 PROCEDURE — 86304 IMMUNOASSAY TUMOR CA 125: CPT

## 2022-11-03 PROCEDURE — 96413 CHEMO IV INFUSION 1 HR: CPT

## 2022-11-03 PROCEDURE — 96375 TX/PRO/DX INJ NEW DRUG ADDON: CPT

## 2022-11-03 PROCEDURE — 36591 DRAW BLOOD OFF VENOUS DEVICE: CPT

## 2022-11-03 PROCEDURE — 2580000003 HC RX 258: Performed by: INTERNAL MEDICINE

## 2022-11-03 PROCEDURE — 85025 COMPLETE CBC W/AUTO DIFF WBC: CPT

## 2022-11-03 PROCEDURE — 80053 COMPREHEN METABOLIC PANEL: CPT

## 2022-11-03 PROCEDURE — 6360000002 HC RX W HCPCS: Performed by: INTERNAL MEDICINE

## 2022-11-03 RX ORDER — HEPARIN SODIUM (PORCINE) LOCK FLUSH IV SOLN 100 UNIT/ML 100 UNIT/ML
500 SOLUTION INTRAVENOUS PRN
Status: DISCONTINUED | OUTPATIENT
Start: 2022-11-03 | End: 2022-11-04 | Stop reason: HOSPADM

## 2022-11-03 RX ORDER — SODIUM CHLORIDE 0.9 % (FLUSH) 0.9 %
10 SYRINGE (ML) INJECTION PRN
Status: DISCONTINUED | OUTPATIENT
Start: 2022-11-03 | End: 2022-11-04 | Stop reason: HOSPADM

## 2022-11-03 RX ORDER — DEXAMETHASONE SODIUM PHOSPHATE 10 MG/ML
10 INJECTION INTRAMUSCULAR; INTRAVENOUS ONCE
Status: COMPLETED | OUTPATIENT
Start: 2022-11-03 | End: 2022-11-03

## 2022-11-03 RX ORDER — SODIUM CHLORIDE 9 MG/ML
20 INJECTION, SOLUTION INTRAVENOUS ONCE
Status: COMPLETED | OUTPATIENT
Start: 2022-11-03 | End: 2022-11-03

## 2022-11-03 RX ADMIN — HEPARIN 500 UNITS: 100 SYRINGE at 13:01

## 2022-11-03 RX ADMIN — SODIUM CHLORIDE, PRESERVATIVE FREE 10 ML: 5 INJECTION INTRAVENOUS at 10:49

## 2022-11-03 RX ADMIN — DEXAMETHASONE SODIUM PHOSPHATE 10 MG: 10 INJECTION INTRAMUSCULAR; INTRAVENOUS at 11:38

## 2022-11-03 RX ADMIN — SODIUM CHLORIDE 20 ML/HR: 9 INJECTION, SOLUTION INTRAVENOUS at 10:49

## 2022-11-03 RX ADMIN — GEMCITABINE HYDROCHLORIDE 1700 MG: 1 INJECTION, SOLUTION INTRAVENOUS at 12:22

## 2022-11-03 RX ADMIN — SODIUM CHLORIDE, PRESERVATIVE FREE 10 ML: 5 INJECTION INTRAVENOUS at 13:00

## 2022-11-03 NOTE — PROGRESS NOTES
Patient arrived per wheelchair for cycle 20 day 1 treatment. Patient denies complaints or concerns. Patient was in hospital for possible stroke on 10/29. Testing was negative for stroke. Spoke with physician about recent hospitalization. Okay to treat. Port accessed;specimen sent. Labs reviewed. Patient premedicated. Gemzar infused with no sign adverse reaction;line flushed. Port flushed and heparinized with intact pete needle removed per protocol. Transportation called. Patient taken to front entrance per wheelchair at discharge to await transportation.

## 2022-11-08 ENCOUNTER — TELEPHONE (OUTPATIENT)
Dept: PRIMARY CARE CLINIC | Age: 57
End: 2022-11-08

## 2022-11-08 ENCOUNTER — TELEPHONE (OUTPATIENT)
Dept: ONCOLOGY | Age: 57
End: 2022-11-08

## 2022-11-08 DIAGNOSIS — C79.51 CANCER, METASTATIC TO BONE (HCC): ICD-10-CM

## 2022-11-08 DIAGNOSIS — C56.9 MALIGNANT NEOPLASM OF OVARY, UNSPECIFIED LATERALITY (HCC): ICD-10-CM

## 2022-11-08 NOTE — TELEPHONE ENCOUNTER
received call from patient stating she was back home and needed transportation for 11/10 infusion.   set up ride with [employer] [facility] [employer] for [employer].

## 2022-11-09 ENCOUNTER — HOSPITAL ENCOUNTER (OUTPATIENT)
Facility: MEDICAL CENTER | Age: 57
End: 2022-11-09
Payer: COMMERCIAL

## 2022-11-09 DIAGNOSIS — C79.51 CANCER, METASTATIC TO BONE (HCC): Primary | ICD-10-CM

## 2022-11-10 ENCOUNTER — TELEPHONE (OUTPATIENT)
Dept: ONCOLOGY | Age: 57
End: 2022-11-10

## 2022-11-10 ENCOUNTER — HOSPITAL ENCOUNTER (OUTPATIENT)
Dept: INFUSION THERAPY | Facility: MEDICAL CENTER | Age: 57
Discharge: HOME | End: 2022-11-10
Payer: COMMERCIAL

## 2022-11-10 VITALS
SYSTOLIC BLOOD PRESSURE: 142 MMHG | TEMPERATURE: 97.9 F | HEART RATE: 82 BPM | DIASTOLIC BLOOD PRESSURE: 63 MMHG | RESPIRATION RATE: 16 BRPM

## 2022-11-10 DIAGNOSIS — C79.51 CANCER, METASTATIC TO BONE (HCC): Primary | ICD-10-CM

## 2022-11-10 DIAGNOSIS — C56.9 MALIGNANT NEOPLASM OF OVARY, UNSPECIFIED LATERALITY (HCC): ICD-10-CM

## 2022-11-10 DIAGNOSIS — Z85.43 HISTORY OF OVARIAN CANCER: ICD-10-CM

## 2022-11-10 DIAGNOSIS — C56.9 MALIGNANT NEOPLASM OF OVARY, UNSPECIFIED LATERALITY (HCC): Primary | ICD-10-CM

## 2022-11-10 DIAGNOSIS — C56.9: ICD-10-CM

## 2022-11-10 LAB
ABSOLUTE EOS #: 0.15 K/UL (ref 0–0.44)
ABSOLUTE IMMATURE GRANULOCYTE: 0.02 K/UL (ref 0–0.3)
ABSOLUTE LYMPH #: 1.54 K/UL (ref 1.1–3.7)
ABSOLUTE MONO #: 0.22 K/UL (ref 0.1–1.2)
ALBUMIN SERPL-MCNC: 4 G/DL (ref 3.5–5.2)
ALP BLD-CCNC: 86 U/L (ref 35–104)
ALT SERPL-CCNC: 11 U/L (ref 5–33)
ANION GAP SERPL CALCULATED.3IONS-SCNC: 9 MMOL/L (ref 9–17)
AST SERPL-CCNC: 12 U/L
BASOPHILS # BLD: 2 % (ref 0–2)
BASOPHILS ABSOLUTE: 0.06 K/UL (ref 0–0.2)
BILIRUB SERPL-MCNC: 0.1 MG/DL (ref 0.3–1.2)
BUN BLDV-MCNC: 10 MG/DL (ref 6–20)
BUN/CREAT BLD: 20 (ref 9–20)
CA 125: 394 U/ML
CALCIUM SERPL-MCNC: 9 MG/DL (ref 8.6–10.4)
CHLORIDE BLD-SCNC: 107 MMOL/L (ref 98–107)
CO2: 27 MMOL/L (ref 20–31)
CREAT SERPL-MCNC: 0.51 MG/DL (ref 0.5–0.9)
EOSINOPHILS RELATIVE PERCENT: 4 % (ref 1–4)
GFR SERPL CREATININE-BSD FRML MDRD: >60 ML/MIN/1.73M2
GLUCOSE BLD-MCNC: 115 MG/DL (ref 70–99)
HCT VFR BLD CALC: 38 % (ref 36.3–47.1)
HEMOGLOBIN: 11.8 G/DL (ref 11.9–15.1)
IMMATURE GRANULOCYTES: 1 %
LYMPHOCYTES # BLD: 44 % (ref 24–43)
MCH RBC QN AUTO: 30.4 PG (ref 25.2–33.5)
MCHC RBC AUTO-ENTMCNC: 31.1 G/DL (ref 28.4–34.8)
MCV RBC AUTO: 97.9 FL (ref 82.6–102.9)
MONOCYTES # BLD: 6 % (ref 3–12)
NRBC AUTOMATED: 0 PER 100 WBC
PDW BLD-RTO: 14.6 % (ref 11.8–14.4)
PLATELET # BLD: 228 K/UL (ref 138–453)
PMV BLD AUTO: 8.6 FL (ref 8.1–13.5)
POTASSIUM SERPL-SCNC: 3.4 MMOL/L (ref 3.7–5.3)
RBC # BLD: 3.88 M/UL (ref 3.95–5.11)
RBC # BLD: ABNORMAL 10*6/UL
SEG NEUTROPHILS: 43 % (ref 36–65)
SEGMENTED NEUTROPHILS ABSOLUTE COUNT: 1.52 K/UL (ref 1.5–8.1)
SODIUM BLD-SCNC: 143 MMOL/L (ref 135–144)
TOTAL PROTEIN: 6.8 G/DL (ref 6.4–8.3)
WBC # BLD: 3.5 K/UL (ref 3.5–11.3)

## 2022-11-10 PROCEDURE — 6360000002 HC RX W HCPCS: Performed by: INTERNAL MEDICINE

## 2022-11-10 PROCEDURE — 80053 COMPREHEN METABOLIC PANEL: CPT

## 2022-11-10 PROCEDURE — 96375 TX/PRO/DX INJ NEW DRUG ADDON: CPT

## 2022-11-10 PROCEDURE — 2580000003 HC RX 258: Performed by: INTERNAL MEDICINE

## 2022-11-10 PROCEDURE — 36591 DRAW BLOOD OFF VENOUS DEVICE: CPT

## 2022-11-10 PROCEDURE — 85025 COMPLETE CBC W/AUTO DIFF WBC: CPT

## 2022-11-10 PROCEDURE — 96413 CHEMO IV INFUSION 1 HR: CPT

## 2022-11-10 PROCEDURE — 86304 IMMUNOASSAY TUMOR CA 125: CPT

## 2022-11-10 RX ORDER — MORPHINE SULFATE 15 MG/1
15 TABLET, FILM COATED, EXTENDED RELEASE ORAL 2 TIMES DAILY
Qty: 60 TABLET | Refills: 0 | Status: SHIPPED | OUTPATIENT
Start: 2022-11-10 | End: 2022-12-10

## 2022-11-10 RX ORDER — HEPARIN SODIUM (PORCINE) LOCK FLUSH IV SOLN 100 UNIT/ML 100 UNIT/ML
500 SOLUTION INTRAVENOUS PRN
Status: DISCONTINUED | OUTPATIENT
Start: 2022-11-10 | End: 2022-11-11 | Stop reason: HOSPADM

## 2022-11-10 RX ORDER — DEXAMETHASONE SODIUM PHOSPHATE 10 MG/ML
8 INJECTION INTRAMUSCULAR; INTRAVENOUS ONCE
Status: COMPLETED | OUTPATIENT
Start: 2022-11-10 | End: 2022-11-10

## 2022-11-10 RX ORDER — SODIUM CHLORIDE 9 MG/ML
20 INJECTION, SOLUTION INTRAVENOUS ONCE
Status: COMPLETED | OUTPATIENT
Start: 2022-11-10 | End: 2022-11-10

## 2022-11-10 RX ORDER — OXYCODONE HYDROCHLORIDE AND ACETAMINOPHEN 5; 325 MG/1; MG/1
TABLET ORAL
Qty: 180 TABLET | Refills: 0 | Status: SHIPPED | OUTPATIENT
Start: 2022-11-10 | End: 2022-12-10

## 2022-11-10 RX ORDER — SODIUM CHLORIDE 0.9 % (FLUSH) 0.9 %
10 SYRINGE (ML) INJECTION PRN
Status: DISCONTINUED | OUTPATIENT
Start: 2022-11-10 | End: 2022-11-11 | Stop reason: HOSPADM

## 2022-11-10 RX ORDER — MORPHINE SULFATE 15 MG/1
15 TABLET, FILM COATED, EXTENDED RELEASE ORAL 2 TIMES DAILY
Qty: 60 TABLET | Refills: 0 | Status: SHIPPED | OUTPATIENT
Start: 2022-11-10 | End: 2022-12-01

## 2022-11-10 RX ADMIN — HEPARIN 500 UNITS: 100 SYRINGE at 12:18

## 2022-11-10 RX ADMIN — SODIUM CHLORIDE 20 ML/HR: 9 INJECTION, SOLUTION INTRAVENOUS at 10:18

## 2022-11-10 RX ADMIN — SODIUM CHLORIDE, PRESERVATIVE FREE 10 ML: 5 INJECTION INTRAVENOUS at 10:15

## 2022-11-10 RX ADMIN — DEXAMETHASONE SODIUM PHOSPHATE 8 MG: 10 INJECTION INTRAMUSCULAR; INTRAVENOUS at 11:01

## 2022-11-10 RX ADMIN — SODIUM CHLORIDE, PRESERVATIVE FREE 10 ML: 5 INJECTION INTRAVENOUS at 12:18

## 2022-11-10 RX ADMIN — GEMCITABINE HYDROCHLORIDE 1700 MG: 1 INJECTION, SOLUTION INTRAVENOUS at 11:36

## 2022-11-10 ASSESSMENT — PAIN SCALES - GENERAL: PAINLEVEL_OUTOF10: 6

## 2022-11-10 ASSESSMENT — PAIN DESCRIPTION - LOCATION: LOCATION: ABDOMEN;BACK

## 2022-11-10 NOTE — TELEPHONE ENCOUNTER
received call from patient stating she is ready for her return ride.  called [employer] for ride.

## 2022-11-10 NOTE — PROGRESS NOTES
Patient arrive ambulatory using rollator per self for cycle 20 day 8 treatment. Denies general complaint or concern but states continues to wait for pain meds as she has requested previously. Vitals as charted. Port accessed; specimen sent. Writer reviewed medications; find that physician has signed 15 mg morphine and it is at pharmacy today. Further patient states she is waiting for the 30 mg morphine and the 5-325 percocet. She states is on 45 mg morphine BID and percocet is every 4 hours as needed for breakthrough pain. Labs reviewed; patient premedicated. Gemzar infused with no sign of adverse reaction; line flushed. Port flushed and heparinized with intact pete needle removed per protocol. Writer explained to physician status of prescriptions and that 15 mg morphine duplicate order has been canceled at pharmacy. Physician states will sign the 30 mg morphine so that it gets to pharmacy for fill; patient notified. Patient ambulate off unit per self at discharge.

## 2022-11-10 NOTE — TELEPHONE ENCOUNTER
This is routing prescription to get corrected doses. Patient is taking total 45 mg Morphine (Ms Contin) twice daily. Previously today 2 of the 15 mg prescriptions were routed to the pharmacy for fill. Should have been 1 fill for 15 mg tab and 1 fill for 30 mg fill. Spoke with Hilario Wong at pharmacy, she is voiding the duplicate order for 15 mg Morphine (MS Contin) and is aware to expect new prescription for 30 mg Morphing (MS Contin)    Routed to MD for review and signature on the 30 mg prescription which was last filled 9/21/22.

## 2022-11-11 ENCOUNTER — TELEPHONE (OUTPATIENT)
Dept: ONCOLOGY | Age: 57
End: 2022-11-11

## 2022-11-11 RX ORDER — MORPHINE SULFATE 30 MG/1
30 TABLET, FILM COATED, EXTENDED RELEASE ORAL 2 TIMES DAILY
Qty: 60 TABLET | Refills: 0 | Status: SHIPPED | OUTPATIENT
Start: 2022-11-11 | End: 2022-12-11

## 2022-11-15 DIAGNOSIS — C56.9 MALIGNANT NEOPLASM OF OVARY, UNSPECIFIED LATERALITY (HCC): Primary | ICD-10-CM

## 2022-11-15 RX ORDER — LORAZEPAM 1 MG/1
1 TABLET ORAL EVERY 8 HOURS PRN
Qty: 90 TABLET | Refills: 0 | Status: SHIPPED | OUTPATIENT
Start: 2022-11-15 | End: 2022-12-15

## 2022-11-17 ENCOUNTER — TELEPHONE (OUTPATIENT)
Dept: ONCOLOGY | Age: 57
End: 2022-11-17

## 2022-11-17 NOTE — TELEPHONE ENCOUNTER
Patient called with schedule update.  made referral to [facility] for appointments on 11/21, 22, and 12/1.

## 2022-11-18 ENCOUNTER — TELEPHONE (OUTPATIENT)
Dept: ONCOLOGY | Age: 57
End: 2022-11-18

## 2022-11-18 NOTE — TELEPHONE ENCOUNTER
Patient to be transported by [employer] on 11/22 at 11:00am and [facility] on 11/21 at 11:45am and 12/1 at 9:00am. Patient updated.

## 2022-11-19 DIAGNOSIS — G47.00 INSOMNIA, UNSPECIFIED TYPE: ICD-10-CM

## 2022-11-19 RX ORDER — MULTIVIT,CALC,MINS/IRON/FOLIC 9MG-400MCG
TABLET ORAL
Qty: 30 TABLET | Refills: 3 | Status: SHIPPED | OUTPATIENT
Start: 2022-11-19

## 2022-11-21 ENCOUNTER — TELEPHONE (OUTPATIENT)
Dept: ONCOLOGY | Age: 57
End: 2022-11-21

## 2022-11-21 NOTE — TELEPHONE ENCOUNTER
Patient called stating she was sick and throwing up and would not be going to her appointment today. [facility] ride with [employer].

## 2022-11-22 ENCOUNTER — TELEPHONE (OUTPATIENT)
Dept: ONCOLOGY | Age: 57
End: 2022-11-22

## 2022-11-22 NOTE — TELEPHONE ENCOUNTER
received voicemail from patient stating she is still sick and needs to cancel transportation and appointment.  cancelled ride with [employer] and appointment had already been cancelled.

## 2022-11-28 ENCOUNTER — HOSPITAL ENCOUNTER (OUTPATIENT)
Facility: MEDICAL CENTER | Age: 57
End: 2022-11-28
Payer: COMMERCIAL

## 2022-11-29 ENCOUNTER — TELEPHONE (OUTPATIENT)
Dept: ONCOLOGY | Age: 57
End: 2022-11-29

## 2022-11-29 NOTE — TELEPHONE ENCOUNTER
received call from patient wanting to confirm transportation for 12/1 appointment.  Patient to be transported by [employer] at [employer].

## 2022-11-30 ENCOUNTER — TELEPHONE (OUTPATIENT)
Dept: ONCOLOGY | Age: 57
End: 2022-11-30

## 2022-11-30 NOTE — TELEPHONE ENCOUNTER
received call from patient stating her ride had not shown yet.  reminded patient that appointment is tomorrow and went over details.

## 2022-12-01 ENCOUNTER — TELEPHONE (OUTPATIENT)
Dept: ONCOLOGY | Age: 57
End: 2022-12-01

## 2022-12-01 ENCOUNTER — OFFICE VISIT (OUTPATIENT)
Dept: ONCOLOGY | Age: 57
End: 2022-12-01
Payer: COMMERCIAL

## 2022-12-01 ENCOUNTER — HOSPITAL ENCOUNTER (OUTPATIENT)
Dept: INFUSION THERAPY | Facility: MEDICAL CENTER | Age: 57
Discharge: HOME | End: 2022-12-01
Payer: COMMERCIAL

## 2022-12-01 VITALS
TEMPERATURE: 98.3 F | HEART RATE: 78 BPM | BODY MASS INDEX: 24.35 KG/M2 | SYSTOLIC BLOOD PRESSURE: 115 MMHG | RESPIRATION RATE: 16 BRPM | DIASTOLIC BLOOD PRESSURE: 68 MMHG | WEIGHT: 146.3 LBS

## 2022-12-01 DIAGNOSIS — C56.9 MALIGNANT NEOPLASM OF OVARY, UNSPECIFIED LATERALITY (HCC): Primary | ICD-10-CM

## 2022-12-01 DIAGNOSIS — C79.51 CANCER, METASTATIC TO BONE (HCC): Primary | ICD-10-CM

## 2022-12-01 DIAGNOSIS — C79.60 MALIGNANT NEOPLASM METASTATIC TO OVARY, UNSPECIFIED LATERALITY (HCC): ICD-10-CM

## 2022-12-01 DIAGNOSIS — C56.9 MALIGNANT NEOPLASM OF OVARY, UNSPECIFIED LATERALITY (HCC): ICD-10-CM

## 2022-12-01 DIAGNOSIS — G47.00 INSOMNIA, UNSPECIFIED TYPE: ICD-10-CM

## 2022-12-01 DIAGNOSIS — D50.8 IRON DEFICIENCY ANEMIA SECONDARY TO INADEQUATE DIETARY IRON INTAKE: ICD-10-CM

## 2022-12-01 DIAGNOSIS — C56.9: ICD-10-CM

## 2022-12-01 DIAGNOSIS — Z85.43 HISTORY OF OVARIAN CANCER: ICD-10-CM

## 2022-12-01 LAB
ABSOLUTE EOS #: 0.11 K/UL (ref 0–0.44)
ABSOLUTE IMMATURE GRANULOCYTE: 0.08 K/UL (ref 0–0.3)
ABSOLUTE LYMPH #: 1.52 K/UL (ref 1.1–3.7)
ABSOLUTE MONO #: 0.78 K/UL (ref 0.1–1.2)
ALBUMIN SERPL-MCNC: 4.3 G/DL (ref 3.5–5.2)
ALP BLD-CCNC: 88 U/L (ref 35–104)
ALT SERPL-CCNC: 7 U/L (ref 5–33)
ANION GAP SERPL CALCULATED.3IONS-SCNC: 11 MMOL/L (ref 9–17)
AST SERPL-CCNC: 14 U/L
BASOPHILS # BLD: 2 % (ref 0–2)
BASOPHILS ABSOLUTE: 0.12 K/UL (ref 0–0.2)
BILIRUB SERPL-MCNC: 0.3 MG/DL (ref 0.3–1.2)
BUN BLDV-MCNC: 13 MG/DL (ref 6–20)
BUN/CREAT BLD: 21 (ref 9–20)
CA 125: 439 U/ML
CALCIUM SERPL-MCNC: 9.2 MG/DL (ref 8.6–10.4)
CHLORIDE BLD-SCNC: 99 MMOL/L (ref 98–107)
CO2: 28 MMOL/L (ref 20–31)
CREAT SERPL-MCNC: 0.62 MG/DL (ref 0.5–0.9)
EOSINOPHILS RELATIVE PERCENT: 2 % (ref 1–4)
GFR SERPL CREATININE-BSD FRML MDRD: >60 ML/MIN/1.73M2
GLUCOSE BLD-MCNC: 122 MG/DL (ref 70–99)
HCT VFR BLD CALC: 44.2 % (ref 36.3–47.1)
HEMOGLOBIN: 13.3 G/DL (ref 11.9–15.1)
IMMATURE GRANULOCYTES: 1 %
LYMPHOCYTES # BLD: 22 % (ref 24–43)
MCH RBC QN AUTO: 30.6 PG (ref 25.2–33.5)
MCHC RBC AUTO-ENTMCNC: 30.1 G/DL (ref 28.4–34.8)
MCV RBC AUTO: 101.8 FL (ref 82.6–102.9)
MONOCYTES # BLD: 12 % (ref 3–12)
NRBC AUTOMATED: 0 PER 100 WBC
PDW BLD-RTO: 16.2 % (ref 11.8–14.4)
PLATELET # BLD: 624 K/UL (ref 138–453)
PMV BLD AUTO: 8.8 FL (ref 8.1–13.5)
POTASSIUM SERPL-SCNC: 4.1 MMOL/L (ref 3.7–5.3)
RBC # BLD: 4.34 M/UL (ref 3.95–5.11)
RBC # BLD: ABNORMAL 10*6/UL
SEG NEUTROPHILS: 61 % (ref 36–65)
SEGMENTED NEUTROPHILS ABSOLUTE COUNT: 4.18 K/UL (ref 1.5–8.1)
SODIUM BLD-SCNC: 138 MMOL/L (ref 135–144)
TOTAL PROTEIN: 7.3 G/DL (ref 6.4–8.3)
WBC # BLD: 6.8 K/UL (ref 3.5–11.3)

## 2022-12-01 PROCEDURE — G8428 CUR MEDS NOT DOCUMENT: HCPCS | Performed by: INTERNAL MEDICINE

## 2022-12-01 PROCEDURE — 99214 OFFICE O/P EST MOD 30 MIN: CPT | Performed by: INTERNAL MEDICINE

## 2022-12-01 PROCEDURE — 6360000002 HC RX W HCPCS: Performed by: INTERNAL MEDICINE

## 2022-12-01 PROCEDURE — 99211 OFF/OP EST MAY X REQ PHY/QHP: CPT | Performed by: INTERNAL MEDICINE

## 2022-12-01 PROCEDURE — G8420 CALC BMI NORM PARAMETERS: HCPCS | Performed by: INTERNAL MEDICINE

## 2022-12-01 PROCEDURE — 96375 TX/PRO/DX INJ NEW DRUG ADDON: CPT

## 2022-12-01 PROCEDURE — 96413 CHEMO IV INFUSION 1 HR: CPT

## 2022-12-01 PROCEDURE — 80053 COMPREHEN METABOLIC PANEL: CPT

## 2022-12-01 PROCEDURE — 86304 IMMUNOASSAY TUMOR CA 125: CPT

## 2022-12-01 PROCEDURE — 3017F COLORECTAL CA SCREEN DOC REV: CPT | Performed by: INTERNAL MEDICINE

## 2022-12-01 PROCEDURE — 2580000003 HC RX 258: Performed by: INTERNAL MEDICINE

## 2022-12-01 PROCEDURE — G8484 FLU IMMUNIZE NO ADMIN: HCPCS | Performed by: INTERNAL MEDICINE

## 2022-12-01 PROCEDURE — 1111F DSCHRG MED/CURRENT MED MERGE: CPT | Performed by: INTERNAL MEDICINE

## 2022-12-01 PROCEDURE — 36591 DRAW BLOOD OFF VENOUS DEVICE: CPT

## 2022-12-01 PROCEDURE — 4004F PT TOBACCO SCREEN RCVD TLK: CPT | Performed by: INTERNAL MEDICINE

## 2022-12-01 PROCEDURE — 85025 COMPLETE CBC W/AUTO DIFF WBC: CPT

## 2022-12-01 RX ORDER — HEPARIN SODIUM (PORCINE) LOCK FLUSH IV SOLN 100 UNIT/ML 100 UNIT/ML
500 SOLUTION INTRAVENOUS PRN
Status: CANCELLED | OUTPATIENT
Start: 2022-12-01

## 2022-12-01 RX ORDER — PANTOPRAZOLE SODIUM 40 MG/1
40 TABLET, DELAYED RELEASE ORAL DAILY
Qty: 30 TABLET | Refills: 3 | Status: SHIPPED | OUTPATIENT
Start: 2022-12-01

## 2022-12-01 RX ORDER — HEPARIN SODIUM (PORCINE) LOCK FLUSH IV SOLN 100 UNIT/ML 100 UNIT/ML
500 SOLUTION INTRAVENOUS PRN
OUTPATIENT
Start: 2023-01-19

## 2022-12-01 RX ORDER — SODIUM CHLORIDE 0.9 % (FLUSH) 0.9 %
5 SYRINGE (ML) INJECTION PRN
Status: CANCELLED | OUTPATIENT
Start: 2022-12-01

## 2022-12-01 RX ORDER — FAMOTIDINE 10 MG/ML
20 INJECTION, SOLUTION INTRAVENOUS ONCE
Status: CANCELLED | OUTPATIENT
Start: 2022-12-01 | End: 2022-12-01

## 2022-12-01 RX ORDER — WATER 1000 ML/1000ML
2.2 INJECTION, SOLUTION INTRAVENOUS ONCE
OUTPATIENT
Start: 2022-12-29 | End: 2022-12-29

## 2022-12-01 RX ORDER — SODIUM CHLORIDE 9 MG/ML
20 INJECTION, SOLUTION INTRAVENOUS ONCE
OUTPATIENT
Start: 2023-01-12 | End: 2023-01-12

## 2022-12-01 RX ORDER — SODIUM CHLORIDE 0.9 % (FLUSH) 0.9 %
10 SYRINGE (ML) INJECTION PRN
OUTPATIENT
Start: 2023-01-19

## 2022-12-01 RX ORDER — DIPHENHYDRAMINE HYDROCHLORIDE 50 MG/ML
50 INJECTION INTRAMUSCULAR; INTRAVENOUS ONCE
OUTPATIENT
Start: 2022-12-29 | End: 2022-12-29

## 2022-12-01 RX ORDER — WATER 1000 ML/1000ML
2.2 INJECTION, SOLUTION INTRAVENOUS ONCE
Status: CANCELLED | OUTPATIENT
Start: 2022-12-01 | End: 2022-12-01

## 2022-12-01 RX ORDER — HEPARIN SODIUM (PORCINE) LOCK FLUSH IV SOLN 100 UNIT/ML 100 UNIT/ML
500 SOLUTION INTRAVENOUS PRN
Status: DISCONTINUED | OUTPATIENT
Start: 2022-12-01 | End: 2022-12-02 | Stop reason: HOSPADM

## 2022-12-01 RX ORDER — EPINEPHRINE 1 MG/ML
0.3 INJECTION, SOLUTION, CONCENTRATE INTRAVENOUS PRN
OUTPATIENT
Start: 2023-01-12

## 2022-12-01 RX ORDER — SODIUM CHLORIDE 9 MG/ML
20 INJECTION, SOLUTION INTRAVENOUS ONCE
OUTPATIENT
Start: 2022-12-22 | End: 2022-12-22

## 2022-12-01 RX ORDER — SODIUM CHLORIDE 9 MG/ML
INJECTION, SOLUTION INTRAVENOUS CONTINUOUS
OUTPATIENT
Start: 2023-01-12

## 2022-12-01 RX ORDER — HEPARIN SODIUM (PORCINE) LOCK FLUSH IV SOLN 100 UNIT/ML 100 UNIT/ML
500 SOLUTION INTRAVENOUS PRN
OUTPATIENT
Start: 2023-01-12

## 2022-12-01 RX ORDER — SODIUM CHLORIDE 0.9 % (FLUSH) 0.9 %
5 SYRINGE (ML) INJECTION PRN
OUTPATIENT
Start: 2022-12-29

## 2022-12-01 RX ORDER — WATER 1000 ML/1000ML
2.2 INJECTION, SOLUTION INTRAVENOUS ONCE
OUTPATIENT
Start: 2023-01-19 | End: 2023-01-19

## 2022-12-01 RX ORDER — DIPHENHYDRAMINE HYDROCHLORIDE 50 MG/ML
50 INJECTION INTRAMUSCULAR; INTRAVENOUS ONCE
Status: CANCELLED | OUTPATIENT
Start: 2022-12-01 | End: 2022-12-01

## 2022-12-01 RX ORDER — SODIUM CHLORIDE 9 MG/ML
20 INJECTION, SOLUTION INTRAVENOUS ONCE
OUTPATIENT
Start: 2022-12-29 | End: 2022-12-29

## 2022-12-01 RX ORDER — SODIUM CHLORIDE 0.9 % (FLUSH) 0.9 %
5 SYRINGE (ML) INJECTION PRN
OUTPATIENT
Start: 2023-01-12

## 2022-12-01 RX ORDER — FAMOTIDINE 10 MG/ML
20 INJECTION, SOLUTION INTRAVENOUS ONCE
OUTPATIENT
Start: 2022-12-22 | End: 2022-12-22

## 2022-12-01 RX ORDER — EPINEPHRINE 1 MG/ML
0.3 INJECTION, SOLUTION, CONCENTRATE INTRAVENOUS PRN
OUTPATIENT
Start: 2022-12-08

## 2022-12-01 RX ORDER — SODIUM CHLORIDE 0.9 % (FLUSH) 0.9 %
10 SYRINGE (ML) INJECTION PRN
OUTPATIENT
Start: 2023-01-12

## 2022-12-01 RX ORDER — SODIUM CHLORIDE 0.9 % (FLUSH) 0.9 %
5 SYRINGE (ML) INJECTION PRN
OUTPATIENT
Start: 2022-12-08

## 2022-12-01 RX ORDER — SODIUM CHLORIDE 0.9 % (FLUSH) 0.9 %
10 SYRINGE (ML) INJECTION PRN
OUTPATIENT
Start: 2022-12-22

## 2022-12-01 RX ORDER — SODIUM CHLORIDE 0.9 % (FLUSH) 0.9 %
5 SYRINGE (ML) INJECTION PRN
OUTPATIENT
Start: 2023-01-19

## 2022-12-01 RX ORDER — METHYLPREDNISOLONE SODIUM SUCCINATE 125 MG/2ML
125 INJECTION, POWDER, LYOPHILIZED, FOR SOLUTION INTRAMUSCULAR; INTRAVENOUS ONCE
OUTPATIENT
Start: 2023-01-12 | End: 2023-01-12

## 2022-12-01 RX ORDER — SODIUM CHLORIDE 0.9 % (FLUSH) 0.9 %
5 SYRINGE (ML) INJECTION PRN
OUTPATIENT
Start: 2022-12-22

## 2022-12-01 RX ORDER — FAMOTIDINE 10 MG/ML
20 INJECTION, SOLUTION INTRAVENOUS ONCE
OUTPATIENT
Start: 2023-01-19 | End: 2023-01-19

## 2022-12-01 RX ORDER — DIPHENHYDRAMINE HYDROCHLORIDE 50 MG/ML
50 INJECTION INTRAMUSCULAR; INTRAVENOUS ONCE
OUTPATIENT
Start: 2022-12-22 | End: 2022-12-22

## 2022-12-01 RX ORDER — EPINEPHRINE 1 MG/ML
0.3 INJECTION, SOLUTION, CONCENTRATE INTRAVENOUS PRN
OUTPATIENT
Start: 2023-01-19

## 2022-12-01 RX ORDER — SODIUM CHLORIDE 9 MG/ML
20 INJECTION, SOLUTION INTRAVENOUS ONCE
OUTPATIENT
Start: 2023-01-19 | End: 2023-01-19

## 2022-12-01 RX ORDER — WATER 1000 ML/1000ML
2.2 INJECTION, SOLUTION INTRAVENOUS ONCE
OUTPATIENT
Start: 2023-01-12 | End: 2023-01-12

## 2022-12-01 RX ORDER — FAMOTIDINE 10 MG/ML
20 INJECTION, SOLUTION INTRAVENOUS ONCE
OUTPATIENT
Start: 2023-01-12 | End: 2023-01-12

## 2022-12-01 RX ORDER — METHYLPREDNISOLONE SODIUM SUCCINATE 125 MG/2ML
125 INJECTION, POWDER, LYOPHILIZED, FOR SOLUTION INTRAMUSCULAR; INTRAVENOUS ONCE
OUTPATIENT
Start: 2022-12-22 | End: 2022-12-22

## 2022-12-01 RX ORDER — FAMOTIDINE 10 MG/ML
20 INJECTION, SOLUTION INTRAVENOUS ONCE
OUTPATIENT
Start: 2022-12-08 | End: 2022-12-08

## 2022-12-01 RX ORDER — METHYLPREDNISOLONE SODIUM SUCCINATE 125 MG/2ML
125 INJECTION, POWDER, LYOPHILIZED, FOR SOLUTION INTRAMUSCULAR; INTRAVENOUS ONCE
Status: CANCELLED | OUTPATIENT
Start: 2022-12-01 | End: 2022-12-01

## 2022-12-01 RX ORDER — SODIUM CHLORIDE 9 MG/ML
20 INJECTION, SOLUTION INTRAVENOUS ONCE
OUTPATIENT
Start: 2022-12-08 | End: 2022-12-08

## 2022-12-01 RX ORDER — SODIUM CHLORIDE 9 MG/ML
20 INJECTION, SOLUTION INTRAVENOUS ONCE
Status: CANCELLED | OUTPATIENT
Start: 2022-12-01 | End: 2022-12-01

## 2022-12-01 RX ORDER — EPINEPHRINE 1 MG/ML
0.3 INJECTION, SOLUTION, CONCENTRATE INTRAVENOUS PRN
OUTPATIENT
Start: 2022-12-29

## 2022-12-01 RX ORDER — EPINEPHRINE 1 MG/ML
0.3 INJECTION, SOLUTION, CONCENTRATE INTRAVENOUS PRN
Status: CANCELLED | OUTPATIENT
Start: 2022-12-01

## 2022-12-01 RX ORDER — METHYLPREDNISOLONE SODIUM SUCCINATE 125 MG/2ML
125 INJECTION, POWDER, LYOPHILIZED, FOR SOLUTION INTRAMUSCULAR; INTRAVENOUS ONCE
OUTPATIENT
Start: 2022-12-29 | End: 2022-12-29

## 2022-12-01 RX ORDER — WATER 1000 ML/1000ML
2.2 INJECTION, SOLUTION INTRAVENOUS ONCE
OUTPATIENT
Start: 2022-12-08 | End: 2022-12-08

## 2022-12-01 RX ORDER — SODIUM CHLORIDE 9 MG/ML
INJECTION, SOLUTION INTRAVENOUS CONTINUOUS
Status: CANCELLED | OUTPATIENT
Start: 2022-12-01

## 2022-12-01 RX ORDER — SODIUM CHLORIDE 9 MG/ML
INJECTION, SOLUTION INTRAVENOUS CONTINUOUS
OUTPATIENT
Start: 2022-12-29

## 2022-12-01 RX ORDER — FAMOTIDINE 10 MG/ML
20 INJECTION, SOLUTION INTRAVENOUS ONCE
OUTPATIENT
Start: 2022-12-29 | End: 2022-12-29

## 2022-12-01 RX ORDER — SODIUM CHLORIDE 0.9 % (FLUSH) 0.9 %
10 SYRINGE (ML) INJECTION PRN
Status: DISCONTINUED | OUTPATIENT
Start: 2022-12-01 | End: 2022-12-02 | Stop reason: HOSPADM

## 2022-12-01 RX ORDER — DEXAMETHASONE SODIUM PHOSPHATE 10 MG/ML
10 INJECTION INTRAMUSCULAR; INTRAVENOUS ONCE
Status: COMPLETED | OUTPATIENT
Start: 2022-12-01 | End: 2022-12-01

## 2022-12-01 RX ORDER — SODIUM CHLORIDE 0.9 % (FLUSH) 0.9 %
10 SYRINGE (ML) INJECTION PRN
OUTPATIENT
Start: 2022-12-29

## 2022-12-01 RX ORDER — WATER 1000 ML/1000ML
2.2 INJECTION, SOLUTION INTRAVENOUS ONCE
OUTPATIENT
Start: 2022-12-22 | End: 2022-12-22

## 2022-12-01 RX ORDER — HEPARIN SODIUM (PORCINE) LOCK FLUSH IV SOLN 100 UNIT/ML 100 UNIT/ML
500 SOLUTION INTRAVENOUS PRN
OUTPATIENT
Start: 2022-12-22

## 2022-12-01 RX ORDER — SODIUM CHLORIDE 9 MG/ML
INJECTION, SOLUTION INTRAVENOUS CONTINUOUS
OUTPATIENT
Start: 2022-12-08

## 2022-12-01 RX ORDER — METHYLPREDNISOLONE SODIUM SUCCINATE 125 MG/2ML
125 INJECTION, POWDER, LYOPHILIZED, FOR SOLUTION INTRAMUSCULAR; INTRAVENOUS ONCE
OUTPATIENT
Start: 2022-12-08 | End: 2022-12-08

## 2022-12-01 RX ORDER — DIPHENHYDRAMINE HYDROCHLORIDE 50 MG/ML
50 INJECTION INTRAMUSCULAR; INTRAVENOUS ONCE
OUTPATIENT
Start: 2023-01-19 | End: 2023-01-19

## 2022-12-01 RX ORDER — METHYLPREDNISOLONE SODIUM SUCCINATE 125 MG/2ML
125 INJECTION, POWDER, LYOPHILIZED, FOR SOLUTION INTRAMUSCULAR; INTRAVENOUS ONCE
OUTPATIENT
Start: 2023-01-19 | End: 2023-01-19

## 2022-12-01 RX ORDER — SODIUM CHLORIDE 9 MG/ML
INJECTION, SOLUTION INTRAVENOUS CONTINUOUS
OUTPATIENT
Start: 2022-12-22

## 2022-12-01 RX ORDER — SODIUM CHLORIDE 0.9 % (FLUSH) 0.9 %
10 SYRINGE (ML) INJECTION PRN
Status: CANCELLED | OUTPATIENT
Start: 2022-12-01

## 2022-12-01 RX ORDER — SODIUM CHLORIDE 0.9 % (FLUSH) 0.9 %
10 SYRINGE (ML) INJECTION PRN
OUTPATIENT
Start: 2022-12-08

## 2022-12-01 RX ORDER — EPINEPHRINE 1 MG/ML
0.3 INJECTION, SOLUTION, CONCENTRATE INTRAVENOUS PRN
OUTPATIENT
Start: 2022-12-22

## 2022-12-01 RX ORDER — DIPHENHYDRAMINE HYDROCHLORIDE 50 MG/ML
50 INJECTION INTRAMUSCULAR; INTRAVENOUS ONCE
OUTPATIENT
Start: 2022-12-08 | End: 2022-12-08

## 2022-12-01 RX ORDER — SODIUM CHLORIDE 9 MG/ML
INJECTION, SOLUTION INTRAVENOUS CONTINUOUS
OUTPATIENT
Start: 2023-01-19

## 2022-12-01 RX ORDER — DIPHENHYDRAMINE HYDROCHLORIDE 50 MG/ML
50 INJECTION INTRAMUSCULAR; INTRAVENOUS ONCE
OUTPATIENT
Start: 2023-01-12 | End: 2023-01-12

## 2022-12-01 RX ORDER — HEPARIN SODIUM (PORCINE) LOCK FLUSH IV SOLN 100 UNIT/ML 100 UNIT/ML
500 SOLUTION INTRAVENOUS PRN
OUTPATIENT
Start: 2022-12-08

## 2022-12-01 RX ORDER — HEPARIN SODIUM (PORCINE) LOCK FLUSH IV SOLN 100 UNIT/ML 100 UNIT/ML
500 SOLUTION INTRAVENOUS PRN
OUTPATIENT
Start: 2022-12-29

## 2022-12-01 RX ADMIN — GEMCITABINE HYDROCHLORIDE 1700 MG: 1 INJECTION, SOLUTION INTRAVENOUS at 12:44

## 2022-12-01 RX ADMIN — SODIUM CHLORIDE, PRESERVATIVE FREE 10 ML: 5 INJECTION INTRAVENOUS at 13:40

## 2022-12-01 RX ADMIN — HEPARIN 500 UNITS: 100 SYRINGE at 13:40

## 2022-12-01 RX ADMIN — DEXAMETHASONE SODIUM PHOSPHATE 10 MG: 10 INJECTION INTRAMUSCULAR; INTRAVENOUS at 12:11

## 2022-12-01 NOTE — TELEPHONE ENCOUNTER
Neelima Iyer MD VISIT & [id number] [facility] IN TO SEE PATIENT  ORDERS RECEIVED  PROCEED WITH CHEMO AS ORDERED  LABS BEFORE CHEMO INCLUDING TUMOR MARKER   RV IN JANUARY  LABS  ALREADY IN PROGRESS  MD VISIT 01/12/23 @10:45AM [email or web address]  AVS PRINTED AND GIVEN TO PATIENT WITH INSTRUCTIONS  PATIENT REMAINS IN [address] [place] [address]

## 2022-12-01 NOTE — PROGRESS NOTES
Patient arrive for cycle 21 day 1 treatment and physician visit. Port accessed; specimen sent. Nova any complaints or concerns    Labs reviewed   MD met with pt  ok to proceed with treatment   Pt premedicated   Gemzar infused with no sign of adverse reaction; line flushed. Port flushed and heparinized with intact pete needle removed per protocol. [employer] called .    Pt discharged off unit

## 2022-12-01 NOTE — PLAN OF CARE
Writer tried calling the patient and letting her know regarding increased platelets and increased . Patient is about to go for chemotherapy for which labs have been ordered. Please call the patient and let her know regarding her blood lab result and that she needs to address it during her chemotherapy session. Thank you.

## 2022-12-02 ENCOUNTER — TELEPHONE (OUTPATIENT)
Dept: ONCOLOGY | Age: 57
End: 2022-12-02

## 2022-12-02 NOTE — PROGRESS NOTES
_      Chief Complaint   Patient presents with    Follow-up    Discuss Labs    Other     Discuss blood pressure medication     Medication Refill     DIAGNOSIS:       Recurrent ovarian cancer. Original diagnosis 2005 with multiple recurrences. Diffuse metastasis. CURRENT THERAPY:         Doxil/ Avastin started 9/18/2020. Avastin was discontinued due to recent GI bleeding. Status post recent vascular embolization  S/p seizure disorder. Gemzar started 2/26/2021. Interrupted due to hospitalization and problem with compliance. Evidence of disease progression on CT scan 2/22/22  Added Carboplatin to Gemzar March 2022      BRIEF CASE HISTORY:      Ms. Gordon Suarez is a very pleasant 59 y.o. female with history of multiple co morbidities as listed. The patient seen in consultation for ovarian cancer with multiple recurrences. 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 She had surgical debulking and she had systemic treatment with Taxol and carboplatin for 6 cycles. Patient was in remission for about 2 years. She had a relapse in 2007 and treated with topotecan with good results. Patient relapsed again in 2008 and was treated with Taxol carboplatin. After 3 cycles of systemic chemotherapy she had problems with carboplatin so she finished 3 more cycles with Taxol. She did well again for 2 to 3 years until she had a relapse in 2012 and she had intraperitoneal chemotherapy treatment with Taxol. Patient was last seen by her oncologist in 2014 at which time she had relatively stable disease with normal tumor marker and no significant abnormal images. Patient moved to [place] after that and she was lost for oncology follow-ups. Patient was recently evaluated again here in [place] because of abdominal discomfort. Re imaging confirmed metastatic relapse.  Biopsy confirmed ovarian cancer recurrence. Scan showed extensive intraabdominal and splenic involvement and lung mets. She has no symptoms at the present time. Patient denies smoking or alcohol drinking.l    INTERIM HISTORY:    patient is seen for follow up recurrent ovarian cancer. Started on Gemzar. Treatment was inturrupted due to sickness and hospitalization. No melena or hematochezia. No hematemesis. No recent seizure activities. Labs are stable as well. No severe anemia. No active bleeding. PAST MEDICAL HISTORY: has a past medical history of Anemia, Bleeding, Cervical cancer ([facility] [address] Depression, Diabetes mellitus ([facility] [address] GERD (gastroesophageal reflux disease), Hx of blood clots, Hypertension, Metastatic cancer ([facility] [address] [facility] Ovarian cancer ([facility] [address] Post chemo evaluation, PRES (posterior reversible encephalopathy syndrome), and Splenic lesion. PAST SURGICAL HISTORY: has a past surgical history that includes Hysterectomy, total abdominal; Port Surgery; Tonsillectomy; IR PORT PLACEMENT > 5 YEARS (08/24/2020); Anus surgery; Abscess Drainage (2013); colectomy (03/2013); IR EMBOLIZATION HEMORRHAGE (10/05/2020); and Cardiac catheterization. CURRENT MEDICATIONS:  has a current medication list which includes the following prescription(s): pantoprazole, apixaban, gnp melatonin maximum strength, lorazepam, morphine, morphine, oxycodone-acetaminophen, lacosamide, levetiracetam, amlodipine, metoprolol tartrate, zeasorb-af, stimulant laxative, lamotrigine, sertraline, ferosul, acidophilus, dicyclomine, handicap placard, loperamide, ondansetron, senna, benzonatate, and hydrocortisone, and the following Facility-Administered Medications: sodium chloride flush and heparin flush. ALLERGIES:  is allergic to carboplatin and ceftriaxone. FAMILY HISTORY: Negative for any hematological or oncological conditions. SOCIAL HISTORY:  reports that she has been smoking cigarettes.  She has a 20.00 pack-year smoking history. She has never used smokeless tobacco. She reports that she does not currently use alcohol. She reports that she does not use drugs. REVIEW OF SYSTEMS:     General: No weakness or fatigue. No unanticipated weight loss or decreased appetite. No fever or chills. Eyes: No blurred vision, eye pain or double vision. Ears: No hearing problems or drainage. No tinnitus. Throat: No sore throat, problems with swallowing or dysphagia. Respiratory: No cough, sputum or hemoptysis. No shortness of breath. No pleuritic chest pain. Cardiovascular: No chest pain, orthopnea or PND. No lower extremity edema. No palpitation. Gastrointestinal: As above. Genitourinary: No dysuria, hematuria, frequency or urgency. Musculoskeletal: No muscle aches or pains. No limitation of movement. No back pain. No gait disturbance, No joint complaints. Dermatologic: No skin rashes or pruritus. No skin lesions or discolorations. Psychiatric: No depression, anxiety, or stress or signs of schizophrenia. No change in mood or affect. Hematologic: No history of bleeding tendency. No bruises or ecchymosis. No history of clotting problems. Infectious disease: No fever, chills or frequent infections. Endocrine: No polydipsia or polyuria. No temperature intolerance. Neurologic: No headaches or dizziness. No weakness or numbness of the extremities. No changes in balance, coordination,  memory, mentation, behavior. Allergic/Immunologic: No nasal congestion or hives. No repeated infections. PHYSICAL EXAM:  The patient is not in acute distress. Vital signs: Blood pressure 115/68, pulse 78, temperature 98.3 °F (36.8 °C), temperature source Oral, resp. rate 16, weight 146 lb 4.8 oz (66.4 kg).      General appearance - well appearing, not in pain or distress  Mental status - good mood, alert and oriented  Eyes - pupils equal and reactive, extraocular eye movements intact  Ears - bilateral TM's and external ear canals normal  Nose - normal and patent, no erythema, discharge or polyps  Mouth - mucous membranes moist, pharynx normal without lesions  Neck - supple, no significant adenopathy  Lymphatics - no palpable lymphadenopathy, no hepatosplenomegaly  Chest - clear to auscultation, no wheezes, rales or rhonchi, symmetric air entry  Heart - normal rate, regular rhythm, normal S1, S2, no murmurs, rubs, clicks or gallops  Abdomen - soft, nontender, nondistended, no masses or organomegaly  Neurological - alert, oriented, normal speech, no focal findings or movement disorder noted  Musculoskeletal - no joint tenderness, deformity or swelling  Extremities - peripheral pulses normal, 2+ pedal edema, no clubbing or cyanosis  Skin - normal coloration and turgor, no rashes, no suspicious skin lesions noted     Review of Diagnostic data:   Lab Results   Component Value Date    WBC 6.8 12/01/2022    HGB 13.3 12/01/2022    HCT 44.2 12/01/2022    .8 12/01/2022     (H) 12/01/2022       Chemistry        Component Value Date/Time     12/01/2022 1024    K 4.1 12/01/2022 1024    CL 99 12/01/2022 1024    CO2 28 12/01/2022 1024    BUN 13 12/01/2022 1024    CREATININE 0.62 12/01/2022 1024        Component Value Date/Time    CALCIUM 9.2 12/01/2022 1024    ALKPHOS 88 12/01/2022 1024    AST 14 12/01/2022 1024    ALT 7 12/01/2022 1024    BILITOT 0.3 12/01/2022 1024          Lab Results   Component Value Date     439 (H) 12/01/2022         IMPRESSION:   Recurrent ovarian cancer. Original diagnosis 2005 with multiple recurrences. Diffuse metastasis. Recent active intra-abdominal bleeding due to splenic mass with GI infiltration. Status post embolization  S/p seizure disorder. lymphedema    PLAN:   S/p multiple hospitalization for different problems as above. Currently patient is relatively stable but in rehab with LE edema. Likely lymphedema. Much better. Continues follow up in lymphedema clinic. Chemo tolerated well. Continue treatment. I reviewed labs as stated above and discussed them with the patient. Labs are adequate for chemotherapy treatment. We will proceed with treatment today. Patient was reminded about potential side effects to treatment. We will continue to monitor labs. Monitor response and toxicity. Will repeat scan in January. Patient's questions were answered to the best of her satisfaction and she verbalized full understanding and agreement.

## 2022-12-06 ENCOUNTER — HOSPITAL ENCOUNTER (OUTPATIENT)
Facility: MEDICAL CENTER | Age: 57
End: 2022-12-06
Payer: COMMERCIAL

## 2022-12-07 DIAGNOSIS — C56.9 MALIGNANT NEOPLASM OF OVARY, UNSPECIFIED LATERALITY (HCC): ICD-10-CM

## 2022-12-07 DIAGNOSIS — C79.51 CANCER, METASTATIC TO BONE (HCC): ICD-10-CM

## 2022-12-07 DIAGNOSIS — G40.909 SEIZURE DISORDER (HCC): ICD-10-CM

## 2022-12-07 RX ORDER — MORPHINE SULFATE 30 MG/1
30 TABLET, FILM COATED, EXTENDED RELEASE ORAL 2 TIMES DAILY
Qty: 60 TABLET | Refills: 0 | Status: SHIPPED | OUTPATIENT
Start: 2022-12-07 | End: 2023-01-06

## 2022-12-07 RX ORDER — OXYCODONE HYDROCHLORIDE AND ACETAMINOPHEN 5; 325 MG/1; MG/1
1 TABLET ORAL EVERY 4 HOURS PRN
Qty: 180 TABLET | Refills: 0 | Status: SHIPPED | OUTPATIENT
Start: 2022-12-07 | End: 2023-01-06

## 2022-12-07 RX ORDER — OXYCODONE HYDROCHLORIDE AND ACETAMINOPHEN 5; 325 MG/1; MG/1
TABLET ORAL
Qty: 180 TABLET | OUTPATIENT
Start: 2022-12-07

## 2022-12-07 RX ORDER — POLYETHYLENE GLYCOL 3350 17 G/17G
17 POWDER, FOR SOLUTION ORAL DAILY PRN
Qty: 527 G | Refills: 1 | Status: SHIPPED | OUTPATIENT
Start: 2022-12-07 | End: 2023-01-06

## 2022-12-07 RX ORDER — MORPHINE SULFATE 15 MG/1
15 TABLET, FILM COATED, EXTENDED RELEASE ORAL 2 TIMES DAILY
Qty: 60 TABLET | OUTPATIENT
Start: 2022-12-07 | End: 2023-01-06

## 2022-12-07 RX ORDER — LEVETIRACETAM 750 MG/1
TABLET ORAL
Qty: 60 TABLET | Refills: 3 | OUTPATIENT
Start: 2022-12-07

## 2022-12-07 RX ORDER — MORPHINE SULFATE 30 MG/1
30 TABLET, FILM COATED, EXTENDED RELEASE ORAL 2 TIMES DAILY
Qty: 60 TABLET | OUTPATIENT
Start: 2022-12-07 | End: 2023-01-06

## 2022-12-07 RX ORDER — LACOSAMIDE 150 MG/1
TABLET ORAL
Qty: 60 TABLET | OUTPATIENT
Start: 2022-12-07

## 2022-12-07 RX ORDER — MORPHINE SULFATE 15 MG/1
15 TABLET, FILM COATED, EXTENDED RELEASE ORAL 2 TIMES DAILY
Qty: 60 TABLET | Refills: 0 | Status: SHIPPED | OUTPATIENT
Start: 2022-12-07 | End: 2023-01-06

## 2022-12-07 NOTE — TELEPHONE ENCOUNTER
Pt also req vmpat and keppra, last rx from pcp, refused per epic surescripts--with note to pharmacy to send to PCP DR [address]

## 2022-12-08 ENCOUNTER — TELEPHONE (OUTPATIENT)
Dept: ONCOLOGY | Age: 57
End: 2022-12-08

## 2022-12-08 ENCOUNTER — HOSPITAL ENCOUNTER (OUTPATIENT)
Dept: INFUSION THERAPY | Facility: MEDICAL CENTER | Age: 57
Discharge: HOME | End: 2022-12-08
Payer: COMMERCIAL

## 2022-12-08 VITALS
SYSTOLIC BLOOD PRESSURE: 124 MMHG | HEART RATE: 80 BPM | DIASTOLIC BLOOD PRESSURE: 64 MMHG | RESPIRATION RATE: 16 BRPM | TEMPERATURE: 98.6 F

## 2022-12-08 DIAGNOSIS — C56.9: ICD-10-CM

## 2022-12-08 DIAGNOSIS — C79.51 CANCER, METASTATIC TO BONE (HCC): Primary | ICD-10-CM

## 2022-12-08 DIAGNOSIS — Z85.43 HISTORY OF OVARIAN CANCER: ICD-10-CM

## 2022-12-08 DIAGNOSIS — C56.9 MALIGNANT NEOPLASM OF OVARY, UNSPECIFIED LATERALITY (HCC): ICD-10-CM

## 2022-12-08 DIAGNOSIS — G40.909 SEIZURE DISORDER (HCC): ICD-10-CM

## 2022-12-08 LAB
ABSOLUTE EOS #: <0.03 K/UL (ref 0–0.44)
ABSOLUTE IMMATURE GRANULOCYTE: 0.04 K/UL (ref 0–0.3)
ABSOLUTE LYMPH #: 1.23 K/UL (ref 1.1–3.7)
ABSOLUTE MONO #: 0.35 K/UL (ref 0.1–1.2)
ALBUMIN SERPL-MCNC: 4.2 G/DL (ref 3.5–5.2)
ALP BLD-CCNC: 83 U/L (ref 35–104)
ALT SERPL-CCNC: 13 U/L (ref 5–33)
ANION GAP SERPL CALCULATED.3IONS-SCNC: 15 MMOL/L (ref 9–17)
AST SERPL-CCNC: 17 U/L
BASOPHILS # BLD: 2 % (ref 0–2)
BASOPHILS ABSOLUTE: 0.06 K/UL (ref 0–0.2)
BILIRUB SERPL-MCNC: 0.2 MG/DL (ref 0.3–1.2)
BUN BLDV-MCNC: 9 MG/DL (ref 6–20)
BUN/CREAT BLD: 14 (ref 9–20)
CA 125: 440 U/ML
CALCIUM SERPL-MCNC: 9 MG/DL (ref 8.6–10.4)
CHLORIDE BLD-SCNC: 99 MMOL/L (ref 98–107)
CO2: 27 MMOL/L (ref 20–31)
CREAT SERPL-MCNC: 0.64 MG/DL (ref 0.5–0.9)
EOSINOPHILS RELATIVE PERCENT: 1 % (ref 1–4)
GFR SERPL CREATININE-BSD FRML MDRD: >60 ML/MIN/1.73M2
GLUCOSE BLD-MCNC: 120 MG/DL (ref 70–99)
HCT VFR BLD CALC: 40.2 % (ref 36.3–47.1)
HEMOGLOBIN: 12.4 G/DL (ref 11.9–15.1)
IMMATURE GRANULOCYTES: 1 %
LYMPHOCYTES # BLD: 33 % (ref 24–43)
MCH RBC QN AUTO: 31.1 PG (ref 25.2–33.5)
MCHC RBC AUTO-ENTMCNC: 30.8 G/DL (ref 28.4–34.8)
MCV RBC AUTO: 100.8 FL (ref 82.6–102.9)
MONOCYTES # BLD: 9 % (ref 3–12)
NRBC AUTOMATED: 0 PER 100 WBC
PDW BLD-RTO: 14.3 % (ref 11.8–14.4)
PLATELET # BLD: 268 K/UL (ref 138–453)
PMV BLD AUTO: 8.9 FL (ref 8.1–13.5)
POTASSIUM SERPL-SCNC: 3.1 MMOL/L (ref 3.7–5.3)
RBC # BLD: 3.99 M/UL (ref 3.95–5.11)
SEG NEUTROPHILS: 55 % (ref 36–65)
SEGMENTED NEUTROPHILS ABSOLUTE COUNT: 2.04 K/UL (ref 1.5–8.1)
SODIUM BLD-SCNC: 141 MMOL/L (ref 135–144)
TOTAL PROTEIN: 7.3 G/DL (ref 6.4–8.3)
WBC # BLD: 3.7 K/UL (ref 3.5–11.3)

## 2022-12-08 PROCEDURE — 86304 IMMUNOASSAY TUMOR CA 125: CPT

## 2022-12-08 PROCEDURE — 2580000003 HC RX 258: Performed by: INTERNAL MEDICINE

## 2022-12-08 PROCEDURE — 96413 CHEMO IV INFUSION 1 HR: CPT

## 2022-12-08 PROCEDURE — 85025 COMPLETE CBC W/AUTO DIFF WBC: CPT

## 2022-12-08 PROCEDURE — 6370000000 HC RX 637 (ALT 250 FOR IP): Performed by: INTERNAL MEDICINE

## 2022-12-08 PROCEDURE — 36591 DRAW BLOOD OFF VENOUS DEVICE: CPT

## 2022-12-08 PROCEDURE — 6360000002 HC RX W HCPCS: Performed by: INTERNAL MEDICINE

## 2022-12-08 PROCEDURE — 96375 TX/PRO/DX INJ NEW DRUG ADDON: CPT

## 2022-12-08 PROCEDURE — 80053 COMPREHEN METABOLIC PANEL: CPT

## 2022-12-08 RX ORDER — LACOSAMIDE 150 MG/1
TABLET ORAL
Qty: 60 TABLET | Refills: 2 | OUTPATIENT
Start: 2022-12-08 | End: 2023-01-07

## 2022-12-08 RX ORDER — HEPARIN SODIUM (PORCINE) LOCK FLUSH IV SOLN 100 UNIT/ML 100 UNIT/ML
500 SOLUTION INTRAVENOUS PRN
Status: DISCONTINUED | OUTPATIENT
Start: 2022-12-08 | End: 2022-12-09 | Stop reason: HOSPADM

## 2022-12-08 RX ORDER — LEVETIRACETAM 750 MG/1
750 TABLET ORAL 2 TIMES DAILY
Qty: 60 TABLET | Refills: 3 | Status: SHIPPED | OUTPATIENT
Start: 2022-12-08

## 2022-12-08 RX ORDER — LACOSAMIDE 150 MG/1
150 TABLET ORAL 2 TIMES DAILY
Qty: 60 TABLET | Refills: 3 | Status: SHIPPED | OUTPATIENT
Start: 2022-12-08 | End: 2025-12-09

## 2022-12-08 RX ORDER — LAMOTRIGINE 25 MG/1
125 TABLET ORAL 2 TIMES DAILY
Qty: 300 TABLET | Refills: 2 | Status: SHIPPED | OUTPATIENT
Start: 2022-12-08

## 2022-12-08 RX ORDER — DEXAMETHASONE SODIUM PHOSPHATE 10 MG/ML
8 INJECTION INTRAMUSCULAR; INTRAVENOUS ONCE
Status: COMPLETED | OUTPATIENT
Start: 2022-12-08 | End: 2022-12-08

## 2022-12-08 RX ORDER — POTASSIUM CHLORIDE 20 MEQ/1
40 TABLET, EXTENDED RELEASE ORAL ONCE
Status: COMPLETED | OUTPATIENT
Start: 2022-12-08 | End: 2022-12-08

## 2022-12-08 RX ORDER — SODIUM CHLORIDE 0.9 % (FLUSH) 0.9 %
10 SYRINGE (ML) INJECTION PRN
Status: DISCONTINUED | OUTPATIENT
Start: 2022-12-08 | End: 2022-12-09 | Stop reason: HOSPADM

## 2022-12-08 RX ORDER — SODIUM CHLORIDE 9 MG/ML
20 INJECTION, SOLUTION INTRAVENOUS ONCE
Status: COMPLETED | OUTPATIENT
Start: 2022-12-08 | End: 2022-12-08

## 2022-12-08 RX ORDER — LEVETIRACETAM 750 MG/1
TABLET ORAL
Qty: 60 TABLET | Refills: 3 | OUTPATIENT
Start: 2022-12-08

## 2022-12-08 RX ADMIN — POTASSIUM CHLORIDE 40 MEQ: 1500 TABLET, EXTENDED RELEASE ORAL at 12:29

## 2022-12-08 RX ADMIN — DEXAMETHASONE SODIUM PHOSPHATE 8 MG: 10 INJECTION INTRAMUSCULAR; INTRAVENOUS at 11:26

## 2022-12-08 RX ADMIN — GEMCITABINE HYDROCHLORIDE 1700 MG: 1 INJECTION, SOLUTION INTRAVENOUS at 12:04

## 2022-12-08 RX ADMIN — SODIUM CHLORIDE 20 ML/HR: 9 INJECTION, SOLUTION INTRAVENOUS at 10:28

## 2022-12-08 RX ADMIN — Medication 10 ML: at 12:51

## 2022-12-08 RX ADMIN — Medication 500 UNITS: at 12:51

## 2022-12-08 ASSESSMENT — PAIN DESCRIPTION - LOCATION: LOCATION: ABDOMEN;BACK

## 2022-12-08 ASSESSMENT — PAIN SCALES - GENERAL: PAINLEVEL_OUTOF10: 4

## 2022-12-08 NOTE — TELEPHONE ENCOUNTER
Peace Galdamez called and would like MD to refill Vimpat and Keppra . Writer informed pt that she has 3 refills for medication and should call , Pt states understanding.

## 2022-12-08 NOTE — PROGRESS NOTES
Patient arrive ambulatory using rollator per self for cycle 21 day 8 treatment. Patient states she has been constipated and states she last went 3-4 days ago. Patient states she takes miralax and senna, states she stopped taking miralax as she didn't feel it was working. Patient also states she has occasional nausea and vomiting. Patient states she is taking potassium supplements at home, states she takes 1 tablet in the morning and 1 tablet at night; states she is unsure who prescribed medication. Denies other complaint or concern. Vitals as charted. Port accessed; specimen sent. Labs reviewed. MD notified of patients potassium of 3.1 (notified no potassium prescription in system, but pt states she has potassium pills at home) and patients complaint of constipation. Per MD, have patient continue taking potassium patient states she has at home, follow potassium replacement protocol here today, and have patient take miralax as directed with lots of water, increase fiber and continue with senna. Patient educated to call office if these measures do not help constipation. Patient verbalizes understanding. Patient premedicated. 40 mEq PO potassium given. Gemzar infused with no sign of adverse reaction; line flushed. Port flushed and heparinized with intact pete needle removed per protocol. Patient ambulate off unit per self at discharge. Writer attempted to call patient and have her read off her current potassium prescription so writer can enter it into patients medications, however patient unable to answer phone. Writer left voicemail stating for patient to continue taking potassium prescription as prescribed and to call office with what potassium prescription states.

## 2022-12-08 NOTE — TELEPHONE ENCOUNTER
Pt's daughter called in asking if we can send in refills of her Vimpat and Keppra? They were prescribed when she was in the hospital. She has a [place] [address] visit with you scheduled for 1/5/23.

## 2022-12-09 ENCOUNTER — TELEPHONE (OUTPATIENT)
Dept: ONCOLOGY | Age: 57
End: 2022-12-09

## 2022-12-09 NOTE — TELEPHONE ENCOUNTER
[facility] to be [facility] on 12/22 (8:00am) and 12/29 (9:00am). Patient updated.

## 2022-12-12 ENCOUNTER — TELEPHONE (OUTPATIENT)
Dept: INFUSION THERAPY | Facility: MEDICAL CENTER | Age: 57
End: 2022-12-12

## 2022-12-20 ENCOUNTER — HOSPITAL ENCOUNTER (OUTPATIENT)
Facility: MEDICAL CENTER | Age: 57
End: 2022-12-20
Payer: COMMERCIAL

## 2022-12-21 ENCOUNTER — CLINICAL DOCUMENTATION (OUTPATIENT)
Dept: OCCUPATIONAL THERAPY | Age: 57
End: 2022-12-21

## 2022-12-21 NOTE — DISCHARGE SUMMARY
TREATMENT LOCATION:   [x] [address]   [facility] [address] [address]: [phone]  F: [phone] [] [address]: [phone]  F: [phone]     Lymphedema Therapy Discharge Note    Date: 2022      Patient: Tyra Weeks  : 1965  MRN: [id number]    Referring Physician: Collins Dennis MD       Phone: [phone]              Fax:   Insurance: Tone Ham (ID: [id number] ) -  visits  Medical Diagnosis: I89.0, C79.60  Rehab Codes: I89.0     Onset Date: 22      Total visits attended: 3        Cancels/No shows: 1  Date of initial visit: 22                Date of final visit: 22      Subjective:  Refer to initial evaluation/ treatment notes. Objective:  Refer to initial evaluation/treatment notes. Assessment:  Refer to initial evaluation/ treatment notes. Treatment to Date:  [] Therapeutic Exercise           [x] Instruction in Home Program                       [] Manual Therapy  [x] Self-Care/Home Management  [x] Vasopneumatic Pump             [] Other:    Discharge Status:   [] Treatment goals were met. [] Pt received maximum benefit. No further therapy indicated at this time. [] Pt to continue exercise/home instructions independently  [x] Pt has not called or returned to clinic in over 90 days with additional concerns. Comments: Called and LM with pt on  to follow up. Nothing heard back. Not seen since 22. Pt may return with new order. Therapy interrupted due to:  [] Pt has 2 or more no shows/cancels, is discontinued per our policy. [] Pt has completed prescribed number of treatment sessions.   [] Other:     Lymphedema Life Impact Scale:  [] Eval 50% functionally impaired  [x] D/C unable to collect    Brief Fatigue Inventory: [] Eval 5- moderate [x] D/C unable to collect                   Electronically signed by Fairy Hodgkin, OT on 2022 at 2:58 PM    The patient is being discharged due to the status listed above. If patient would like to return to the clinic for additional therapy a new referral will be necessary. Thank you again for your referral and allowing us to assist in the care of this patient! For any questions or concerns, please don't hesitate to call us at [phone].

## 2022-12-22 ENCOUNTER — TELEPHONE (OUTPATIENT)
Dept: ONCOLOGY | Age: 57
End: 2022-12-22

## 2022-12-22 ENCOUNTER — HOSPITAL ENCOUNTER (OUTPATIENT)
Dept: INFUSION THERAPY | Facility: MEDICAL CENTER | Age: 57
Discharge: HOME | End: 2022-12-22
Payer: COMMERCIAL

## 2022-12-22 VITALS
SYSTOLIC BLOOD PRESSURE: 109 MMHG | RESPIRATION RATE: 18 BRPM | HEART RATE: 103 BPM | DIASTOLIC BLOOD PRESSURE: 68 MMHG | TEMPERATURE: 99.1 F

## 2022-12-22 DIAGNOSIS — C56.9 MALIGNANT NEOPLASM OF OVARY, UNSPECIFIED LATERALITY (HCC): Primary | ICD-10-CM

## 2022-12-22 DIAGNOSIS — C56.9: ICD-10-CM

## 2022-12-22 DIAGNOSIS — C79.51 CANCER, METASTATIC TO BONE (HCC): ICD-10-CM

## 2022-12-22 DIAGNOSIS — Z85.43 HISTORY OF OVARIAN CANCER: ICD-10-CM

## 2022-12-22 LAB
ABSOLUTE EOS #: 0.23 K/UL (ref 0–0.44)
ABSOLUTE IMMATURE GRANULOCYTE: 0.04 K/UL (ref 0–0.3)
ABSOLUTE LYMPH #: 1.56 K/UL (ref 1.1–3.7)
ABSOLUTE MONO #: 0.58 K/UL (ref 0.1–1.2)
ALBUMIN SERPL-MCNC: 4 G/DL (ref 3.5–5.2)
ALP BLD-CCNC: 84 U/L (ref 35–104)
ALT SERPL-CCNC: 7 U/L (ref 5–33)
ANION GAP SERPL CALCULATED.3IONS-SCNC: 10 MMOL/L (ref 9–17)
AST SERPL-CCNC: 14 U/L
BASOPHILS # BLD: 1 % (ref 0–2)
BASOPHILS ABSOLUTE: 0.04 K/UL (ref 0–0.2)
BILIRUB SERPL-MCNC: 0.1 MG/DL (ref 0.3–1.2)
BUN BLDV-MCNC: 7 MG/DL (ref 6–20)
BUN/CREAT BLD: 11 (ref 9–20)
CA 125: 406 U/ML
CALCIUM SERPL-MCNC: 8.9 MG/DL (ref 8.6–10.4)
CHLORIDE BLD-SCNC: 103 MMOL/L (ref 98–107)
CO2: 28 MMOL/L (ref 20–31)
CREAT SERPL-MCNC: 0.63 MG/DL (ref 0.5–0.9)
EOSINOPHILS RELATIVE PERCENT: 3 % (ref 1–4)
GFR SERPL CREATININE-BSD FRML MDRD: >60 ML/MIN/1.73M2
GLUCOSE BLD-MCNC: 128 MG/DL (ref 70–99)
HCT VFR BLD CALC: 39.9 % (ref 36.3–47.1)
HEMOGLOBIN: 12.3 G/DL (ref 11.9–15.1)
IMMATURE GRANULOCYTES: 1 %
LYMPHOCYTES # BLD: 23 % (ref 24–43)
MCH RBC QN AUTO: 31.7 PG (ref 25.2–33.5)
MCHC RBC AUTO-ENTMCNC: 30.8 G/DL (ref 28.4–34.8)
MCV RBC AUTO: 102.8 FL (ref 82.6–102.9)
MONOCYTES # BLD: 8 % (ref 3–12)
NRBC AUTOMATED: 0 PER 100 WBC
PDW BLD-RTO: 16.9 % (ref 11.8–14.4)
PLATELET # BLD: 481 K/UL (ref 138–453)
PMV BLD AUTO: 9 FL (ref 8.1–13.5)
POTASSIUM SERPL-SCNC: 3.7 MMOL/L (ref 3.7–5.3)
RBC # BLD: 3.88 M/UL (ref 3.95–5.11)
RBC # BLD: ABNORMAL 10*6/UL
SEG NEUTROPHILS: 65 % (ref 36–65)
SEGMENTED NEUTROPHILS ABSOLUTE COUNT: 4.45 K/UL (ref 1.5–8.1)
SODIUM BLD-SCNC: 141 MMOL/L (ref 135–144)
TOTAL PROTEIN: 7 G/DL (ref 6.4–8.3)
WBC # BLD: 6.9 K/UL (ref 3.5–11.3)

## 2022-12-22 PROCEDURE — 96375 TX/PRO/DX INJ NEW DRUG ADDON: CPT

## 2022-12-22 PROCEDURE — 2580000003 HC RX 258: Performed by: INTERNAL MEDICINE

## 2022-12-22 PROCEDURE — 86304 IMMUNOASSAY TUMOR CA 125: CPT

## 2022-12-22 PROCEDURE — 6360000002 HC RX W HCPCS: Performed by: INTERNAL MEDICINE

## 2022-12-22 PROCEDURE — 85025 COMPLETE CBC W/AUTO DIFF WBC: CPT

## 2022-12-22 PROCEDURE — 80053 COMPREHEN METABOLIC PANEL: CPT

## 2022-12-22 PROCEDURE — 36591 DRAW BLOOD OFF VENOUS DEVICE: CPT

## 2022-12-22 PROCEDURE — 96413 CHEMO IV INFUSION 1 HR: CPT

## 2022-12-22 RX ORDER — DEXAMETHASONE SODIUM PHOSPHATE 10 MG/ML
10 INJECTION INTRAMUSCULAR; INTRAVENOUS ONCE
Status: COMPLETED | OUTPATIENT
Start: 2022-12-22 | End: 2022-12-22

## 2022-12-22 RX ORDER — SODIUM CHLORIDE 0.9 % (FLUSH) 0.9 %
10 SYRINGE (ML) INJECTION PRN
Status: DISCONTINUED | OUTPATIENT
Start: 2022-12-22 | End: 2022-12-23 | Stop reason: HOSPADM

## 2022-12-22 RX ORDER — HEPARIN SODIUM (PORCINE) LOCK FLUSH IV SOLN 100 UNIT/ML 100 UNIT/ML
500 SOLUTION INTRAVENOUS PRN
Status: DISCONTINUED | OUTPATIENT
Start: 2022-12-22 | End: 2022-12-23 | Stop reason: HOSPADM

## 2022-12-22 RX ORDER — SODIUM CHLORIDE 9 MG/ML
20 INJECTION, SOLUTION INTRAVENOUS ONCE
Status: COMPLETED | OUTPATIENT
Start: 2022-12-22 | End: 2022-12-22

## 2022-12-22 RX ORDER — SERTRALINE HYDROCHLORIDE 100 MG/1
100 TABLET, FILM COATED ORAL DAILY
COMMUNITY

## 2022-12-22 RX ADMIN — Medication 500 UNITS: at 11:44

## 2022-12-22 RX ADMIN — GEMCITABINE HYDROCHLORIDE 1700 MG: 1 INJECTION, SOLUTION INTRAVENOUS at 10:53

## 2022-12-22 RX ADMIN — SODIUM CHLORIDE, PRESERVATIVE FREE 10 ML: 5 INJECTION INTRAVENOUS at 11:44

## 2022-12-22 RX ADMIN — DEXAMETHASONE SODIUM PHOSPHATE 10 MG: 10 INJECTION INTRAMUSCULAR; INTRAVENOUS at 10:11

## 2022-12-22 RX ADMIN — SODIUM CHLORIDE 20 ML/HR: 9 INJECTION, SOLUTION INTRAVENOUS at 09:10

## 2022-12-22 RX ADMIN — SODIUM CHLORIDE, PRESERVATIVE FREE 20 ML: 5 INJECTION INTRAVENOUS at 09:10

## 2022-12-22 ASSESSMENT — PAIN DESCRIPTION - LOCATION: LOCATION: BACK

## 2022-12-22 ASSESSMENT — PAIN SCALES - GENERAL: PAINLEVEL_OUTOF10: 8

## 2022-12-22 NOTE — PLAN OF CARE
Problem: Safety - Adult  Goal: Free from fall injury  12/22/2022 0856 by Jermain Acosta RN  Outcome: Completed     Problem: Safety - Adult  Goal: Free from fall injury  Outcome: Completed

## 2022-12-22 NOTE — PROGRESS NOTES
PT ARRIVES PER AMB PER SELF AND LABS AND ORDERS REVIEWED AND NS FLUSHING LINE BEFORE AND AFTER PREMED AND CHEMO AND NO REACTIONS OR COMPLAINTS AND BLOOD RETURN PRESENT THROUGHOUT INFUSION. PT DISCHARGED PER SELF AND STATES HAS NEXT APPTS.

## 2022-12-22 NOTE — TELEPHONE ENCOUNTER
Name: Kylah Caba [facility]  : 1965  MRN: [id number]    Oncology Navigation Follow-Up Note    Contact Type:  Telephone    Notes: Pt called in stating needs assistance finding apartment. Notified pt may contact Estiven Chandler or ROME SAVAGE [facility] social workers & contact #'s given. Pt stated received 1 time assistance from [facility] pt need to contact when ready for next p/u & contact # given. Pt spoke of son's death & began to cry, support given. Encouraged pt to contact  & [facility] for assistance. Instructed pt may contact writer kaley Dc updated  on pt. Will continue to follow.     Electronically signed by Carlos Maddox RN on 2022 at 10:20 AM

## 2022-12-27 ENCOUNTER — HOSPITAL ENCOUNTER (OUTPATIENT)
Facility: MEDICAL CENTER | Age: 57
End: 2022-12-27

## 2022-12-29 ENCOUNTER — HOSPITAL ENCOUNTER (OUTPATIENT)
Dept: INFUSION THERAPY | Facility: MEDICAL CENTER | Age: 57
Discharge: HOME | End: 2022-12-29

## 2023-01-02 DIAGNOSIS — C56.9 MALIGNANT NEOPLASM OF OVARY, UNSPECIFIED LATERALITY (HCC): ICD-10-CM

## 2023-01-02 DIAGNOSIS — C79.51 CANCER, METASTATIC TO BONE (HCC): ICD-10-CM

## 2023-01-02 RX ORDER — LOPERAMIDE HYDROCHLORIDE 2 MG/1
CAPSULE ORAL
Qty: 60 CAPSULE | Refills: 2 | Status: SHIPPED | OUTPATIENT
Start: 2023-01-02

## 2023-01-02 RX ORDER — MORPHINE SULFATE 15 MG/1
15 TABLET, FILM COATED, EXTENDED RELEASE ORAL 2 TIMES DAILY
Qty: 60 TABLET | Status: CANCELLED | OUTPATIENT
Start: 2023-01-02

## 2023-01-03 ENCOUNTER — TELEPHONE (OUTPATIENT)
Dept: ONCOLOGY | Age: 58
End: 2023-01-03

## 2023-01-03 RX ORDER — MORPHINE SULFATE 15 MG/1
15 TABLET, FILM COATED, EXTENDED RELEASE ORAL 2 TIMES DAILY
Qty: 60 TABLET | Refills: 0 | Status: SHIPPED | OUTPATIENT
Start: 2023-01-03 | End: 2023-02-02

## 2023-01-03 RX ORDER — MORPHINE SULFATE 30 MG/1
30 TABLET, FILM COATED, EXTENDED RELEASE ORAL 2 TIMES DAILY
Qty: 60 TABLET | Refills: 0 | Status: SHIPPED | OUTPATIENT
Start: 2023-01-03 | End: 2023-02-02

## 2023-01-03 RX ORDER — OXYCODONE HYDROCHLORIDE AND ACETAMINOPHEN 5; 325 MG/1; MG/1
1 TABLET ORAL EVERY 4 HOURS PRN
Qty: 180 TABLET | Refills: 0 | Status: SHIPPED | OUTPATIENT
Start: 2023-01-03 | End: 2023-02-02

## 2023-01-03 NOTE — TELEPHONE ENCOUNTER
Patient to be transported by [facility] on 1/5 at 12:00pm. Patient updated.

## 2023-01-04 ENCOUNTER — HOSPITAL ENCOUNTER (OUTPATIENT)
Facility: MEDICAL CENTER | Age: 58
End: 2023-01-04

## 2023-01-05 ENCOUNTER — HOSPITAL ENCOUNTER (OUTPATIENT)
Dept: INFUSION THERAPY | Facility: MEDICAL CENTER | Age: 58
Discharge: HOME | End: 2023-01-05

## 2023-01-05 VITALS
TEMPERATURE: 98.1 F | SYSTOLIC BLOOD PRESSURE: 151 MMHG | HEART RATE: 106 BPM | RESPIRATION RATE: 18 BRPM | DIASTOLIC BLOOD PRESSURE: 78 MMHG

## 2023-01-05 DIAGNOSIS — C56.9: ICD-10-CM

## 2023-01-05 DIAGNOSIS — C79.51 CANCER, METASTATIC TO BONE (HCC): ICD-10-CM

## 2023-01-05 DIAGNOSIS — C56.9 MALIGNANT NEOPLASM OF OVARY, UNSPECIFIED LATERALITY (HCC): Primary | ICD-10-CM

## 2023-01-05 DIAGNOSIS — Z85.43 HISTORY OF OVARIAN CANCER: ICD-10-CM

## 2023-01-05 LAB
ABSOLUTE EOS #: 0.1 K/UL (ref 0–0.44)
ABSOLUTE IMMATURE GRANULOCYTE: 0.05 K/UL (ref 0–0.3)
ABSOLUTE LYMPH #: 1.05 K/UL (ref 1.1–3.7)
ABSOLUTE MONO #: 0.94 K/UL (ref 0.1–1.2)
ALBUMIN SERPL-MCNC: 3.9 G/DL (ref 3.5–5.2)
ALP BLD-CCNC: 74 U/L (ref 35–104)
ALT SERPL-CCNC: 5 U/L (ref 5–33)
ANION GAP SERPL CALCULATED.3IONS-SCNC: 11 MMOL/L (ref 9–17)
AST SERPL-CCNC: 12 U/L
BASOPHILS # BLD: 1 % (ref 0–2)
BASOPHILS ABSOLUTE: 0.07 K/UL (ref 0–0.2)
BILIRUB SERPL-MCNC: 0.2 MG/DL (ref 0.3–1.2)
BUN BLDV-MCNC: 13 MG/DL (ref 6–20)
BUN/CREAT BLD: 25 (ref 9–20)
CA 125: 434 U/ML
CALCIUM SERPL-MCNC: 8.8 MG/DL (ref 8.6–10.4)
CHLORIDE BLD-SCNC: 103 MMOL/L (ref 98–107)
CO2: 26 MMOL/L (ref 20–31)
CREAT SERPL-MCNC: 0.53 MG/DL (ref 0.5–0.9)
EOSINOPHILS RELATIVE PERCENT: 1 % (ref 1–4)
GFR SERPL CREATININE-BSD FRML MDRD: >60 ML/MIN/1.73M2
GLUCOSE BLD-MCNC: 105 MG/DL (ref 70–99)
HCT VFR BLD CALC: 37.4 % (ref 36.3–47.1)
HEMOGLOBIN: 11.7 G/DL (ref 11.9–15.1)
IMMATURE GRANULOCYTES: 1 %
LYMPHOCYTES # BLD: 12 % (ref 24–43)
MCH RBC QN AUTO: 32.2 PG (ref 25.2–33.5)
MCHC RBC AUTO-ENTMCNC: 31.3 G/DL (ref 28.4–34.8)
MCV RBC AUTO: 103 FL (ref 82.6–102.9)
MONOCYTES # BLD: 10 % (ref 3–12)
NRBC AUTOMATED: 0 PER 100 WBC
PDW BLD-RTO: 17.1 % (ref 11.8–14.4)
PLATELET # BLD: 248 K/UL (ref 138–453)
PMV BLD AUTO: 9.1 FL (ref 8.1–13.5)
POTASSIUM SERPL-SCNC: 3.5 MMOL/L (ref 3.7–5.3)
RBC # BLD: 3.63 M/UL (ref 3.95–5.11)
RBC # BLD: ABNORMAL 10*6/UL
SEG NEUTROPHILS: 75 % (ref 36–65)
SEGMENTED NEUTROPHILS ABSOLUTE COUNT: 6.82 K/UL (ref 1.5–8.1)
SODIUM BLD-SCNC: 140 MMOL/L (ref 135–144)
TOTAL PROTEIN: 6.8 G/DL (ref 6.4–8.3)
WBC # BLD: 9 K/UL (ref 3.5–11.3)

## 2023-01-05 PROCEDURE — 96375 TX/PRO/DX INJ NEW DRUG ADDON: CPT

## 2023-01-05 PROCEDURE — 96372 THER/PROPH/DIAG INJ SC/IM: CPT

## 2023-01-05 PROCEDURE — 96413 CHEMO IV INFUSION 1 HR: CPT

## 2023-01-05 PROCEDURE — 80053 COMPREHEN METABOLIC PANEL: CPT

## 2023-01-05 PROCEDURE — 6360000002 HC RX W HCPCS: Performed by: INTERNAL MEDICINE

## 2023-01-05 PROCEDURE — 85025 COMPLETE CBC W/AUTO DIFF WBC: CPT

## 2023-01-05 PROCEDURE — 2580000003 HC RX 258: Performed by: INTERNAL MEDICINE

## 2023-01-05 PROCEDURE — 36591 DRAW BLOOD OFF VENOUS DEVICE: CPT

## 2023-01-05 PROCEDURE — 86304 IMMUNOASSAY TUMOR CA 125: CPT

## 2023-01-05 RX ORDER — SODIUM CHLORIDE 9 MG/ML
20 INJECTION, SOLUTION INTRAVENOUS ONCE
Status: COMPLETED | OUTPATIENT
Start: 2023-01-05 | End: 2023-01-05

## 2023-01-05 RX ORDER — DEXAMETHASONE SODIUM PHOSPHATE 10 MG/ML
8 INJECTION INTRAMUSCULAR; INTRAVENOUS ONCE
Status: COMPLETED | OUTPATIENT
Start: 2023-01-05 | End: 2023-01-05

## 2023-01-05 RX ORDER — SODIUM CHLORIDE 0.9 % (FLUSH) 0.9 %
10 SYRINGE (ML) INJECTION PRN
Status: DISCONTINUED | OUTPATIENT
Start: 2023-01-05 | End: 2023-01-06 | Stop reason: HOSPADM

## 2023-01-05 RX ORDER — HEPARIN SODIUM (PORCINE) LOCK FLUSH IV SOLN 100 UNIT/ML 100 UNIT/ML
500 SOLUTION INTRAVENOUS PRN
Status: DISCONTINUED | OUTPATIENT
Start: 2023-01-05 | End: 2023-01-06 | Stop reason: HOSPADM

## 2023-01-05 RX ADMIN — DEXAMETHASONE SODIUM PHOSPHATE 8 MG: 10 INJECTION INTRAMUSCULAR; INTRAVENOUS at 14:17

## 2023-01-05 RX ADMIN — SODIUM CHLORIDE, PRESERVATIVE FREE 20 ML: 5 INJECTION INTRAVENOUS at 15:28

## 2023-01-05 RX ADMIN — SODIUM CHLORIDE 20 ML/HR: 9 INJECTION, SOLUTION INTRAVENOUS at 13:11

## 2023-01-05 RX ADMIN — SODIUM CHLORIDE, PRESERVATIVE FREE 20 ML: 5 INJECTION INTRAVENOUS at 13:11

## 2023-01-05 RX ADMIN — Medication 500 UNITS: at 15:28

## 2023-01-05 RX ADMIN — GEMCITABINE HYDROCHLORIDE 1700 MG: 1 INJECTION, SOLUTION INTRAVENOUS at 14:48

## 2023-01-05 ASSESSMENT — PAIN DESCRIPTION - LOCATION: LOCATION: BACK

## 2023-01-05 ASSESSMENT — PAIN SCALES - GENERAL: PAINLEVEL_OUTOF10: 8

## 2023-01-05 NOTE — PROGRESS NOTES
Patient arrive for cycle 22 day 1 treatment. Port accessed; specimen sent. Vitals as charted. Huma Canalesid any complaints or concerns other then fatigue and nausea. Labs reviewed   Pt premedicated   Gemzar infused with no sign of adverse reaction; line flushed. Port flushed and heparinized with intact pete needle removed per protocol, Band-Aid applied to site. [employer] called .    Pt discharged off unit

## 2023-01-06 NOTE — PLAN OF CARE
Problem: Pain:  Goal: Pain level will decrease  Description: Pain level will decrease  Outcome: Met This Shift  Goal: Control of acute pain  Description: Control of acute pain  Outcome: Met This Shift  Goal: Control of chronic pain  Description: Control of chronic pain  Outcome: Met This Shift ambulatory

## 2023-01-13 ENCOUNTER — HOSPITAL ENCOUNTER (OUTPATIENT)
Facility: MEDICAL CENTER | Age: 58
End: 2023-01-13

## 2023-01-17 ENCOUNTER — HOSPITAL ENCOUNTER (OUTPATIENT)
Dept: INFUSION THERAPY | Facility: MEDICAL CENTER | Age: 58
Discharge: HOME | End: 2023-01-17

## 2023-01-17 ENCOUNTER — TELEPHONE (OUTPATIENT)
Dept: SPIRITUAL SERVICES | Age: 58
End: 2023-01-17

## 2023-01-17 NOTE — TELEPHONE ENCOUNTER
Writer spoke briefly with Patient on the phone. Patient shared that she was \"not good. \" Pt declined talking about it further. She told writer she could call her another time. Pt noted that there was a mix up about her appointment today. She will be coming to the clinic on the 26th. Patient was receptive to talking with writer then. Writer offered empathy and words of support. Writer assured Pt of her prayers. Patient was tearful and thanked writer.     Electronically signed by Aquilino Calle, Oncology Outpatient [facility], [address] [facility] Radiation Oncology  1/17/2023  1:45 PM

## 2023-01-24 ENCOUNTER — HOSPITAL ENCOUNTER (OUTPATIENT)
Facility: MEDICAL CENTER | Age: 58
End: 2023-01-24
Payer: COMMERCIAL

## 2023-01-26 ENCOUNTER — HOSPITAL ENCOUNTER (OUTPATIENT)
Dept: INFUSION THERAPY | Facility: MEDICAL CENTER | Age: 58
Discharge: HOME | End: 2023-01-26
Payer: COMMERCIAL

## 2023-01-26 ENCOUNTER — OFFICE VISIT (OUTPATIENT)
Dept: ONCOLOGY | Age: 58
End: 2023-01-26
Payer: COMMERCIAL

## 2023-01-26 ENCOUNTER — TELEPHONE (OUTPATIENT)
Dept: ONCOLOGY | Age: 58
End: 2023-01-26

## 2023-01-26 VITALS
BODY MASS INDEX: 23.41 KG/M2 | WEIGHT: 140.7 LBS | RESPIRATION RATE: 16 BRPM | HEART RATE: 101 BPM | SYSTOLIC BLOOD PRESSURE: 118 MMHG | DIASTOLIC BLOOD PRESSURE: 63 MMHG | TEMPERATURE: 98.1 F

## 2023-01-26 DIAGNOSIS — C79.60 MALIGNANT NEOPLASM METASTATIC TO OVARY, UNSPECIFIED LATERALITY (HCC): Primary | ICD-10-CM

## 2023-01-26 DIAGNOSIS — Z85.43 HISTORY OF OVARIAN CANCER: ICD-10-CM

## 2023-01-26 DIAGNOSIS — C79.51 CANCER, METASTATIC TO BONE (HCC): ICD-10-CM

## 2023-01-26 DIAGNOSIS — C56.9: ICD-10-CM

## 2023-01-26 DIAGNOSIS — C56.9 MALIGNANT NEOPLASM OF OVARY, UNSPECIFIED LATERALITY (HCC): Primary | ICD-10-CM

## 2023-01-26 LAB
ABSOLUTE EOS #: 0.08 K/UL (ref 0–0.44)
ABSOLUTE IMMATURE GRANULOCYTE: 0.04 K/UL (ref 0–0.3)
ABSOLUTE LYMPH #: 1.37 K/UL (ref 1.1–3.7)
ABSOLUTE MONO #: 0.86 K/UL (ref 0.1–1.2)
ALBUMIN SERPL-MCNC: 3.8 G/DL (ref 3.5–5.2)
ALP BLD-CCNC: 79 U/L (ref 35–104)
ALT SERPL-CCNC: <5 U/L (ref 5–33)
ANION GAP SERPL CALCULATED.3IONS-SCNC: 13 MMOL/L (ref 9–17)
AST SERPL-CCNC: 12 U/L
BASOPHILS # BLD: 1 % (ref 0–2)
BASOPHILS ABSOLUTE: 0.1 K/UL (ref 0–0.2)
BILIRUB SERPL-MCNC: 0.1 MG/DL (ref 0.3–1.2)
BUN BLDV-MCNC: 11 MG/DL (ref 6–20)
BUN/CREAT BLD: 16 (ref 9–20)
CA 125: 490 U/ML
CALCIUM SERPL-MCNC: 8.5 MG/DL (ref 8.6–10.4)
CHLORIDE BLD-SCNC: 100 MMOL/L (ref 98–107)
CO2: 28 MMOL/L (ref 20–31)
CREAT SERPL-MCNC: 0.7 MG/DL (ref 0.5–0.9)
EOSINOPHILS RELATIVE PERCENT: 1 % (ref 1–4)
GFR SERPL CREATININE-BSD FRML MDRD: >60 ML/MIN/1.73M2
GLUCOSE BLD-MCNC: 176 MG/DL (ref 70–99)
HCT VFR BLD CALC: 40.7 % (ref 36.3–47.1)
HEMOGLOBIN: 12.3 G/DL (ref 11.9–15.1)
IMMATURE GRANULOCYTES: 0 %
LYMPHOCYTES # BLD: 15 % (ref 24–43)
MCH RBC QN AUTO: 30.5 PG (ref 25.2–33.5)
MCHC RBC AUTO-ENTMCNC: 30.2 G/DL (ref 28.4–34.8)
MCV RBC AUTO: 101 FL (ref 82.6–102.9)
MONOCYTES # BLD: 10 % (ref 3–12)
NRBC AUTOMATED: 0 PER 100 WBC
PDW BLD-RTO: 17.2 % (ref 11.8–14.4)
PLATELET # BLD: 559 K/UL (ref 138–453)
PMV BLD AUTO: 8.6 FL (ref 8.1–13.5)
POTASSIUM SERPL-SCNC: 3.1 MMOL/L (ref 3.7–5.3)
RBC # BLD: 4.03 M/UL (ref 3.95–5.11)
RBC # BLD: ABNORMAL 10*6/UL
SEG NEUTROPHILS: 73 % (ref 36–65)
SEGMENTED NEUTROPHILS ABSOLUTE COUNT: 6.59 K/UL (ref 1.5–8.1)
SODIUM BLD-SCNC: 141 MMOL/L (ref 135–144)
TOTAL PROTEIN: 6.7 G/DL (ref 6.4–8.3)
WBC # BLD: 9 K/UL (ref 3.5–11.3)

## 2023-01-26 PROCEDURE — 96413 CHEMO IV INFUSION 1 HR: CPT

## 2023-01-26 PROCEDURE — G8484 FLU IMMUNIZE NO ADMIN: HCPCS | Performed by: INTERNAL MEDICINE

## 2023-01-26 PROCEDURE — 4004F PT TOBACCO SCREEN RCVD TLK: CPT | Performed by: INTERNAL MEDICINE

## 2023-01-26 PROCEDURE — 80053 COMPREHEN METABOLIC PANEL: CPT

## 2023-01-26 PROCEDURE — G8420 CALC BMI NORM PARAMETERS: HCPCS | Performed by: INTERNAL MEDICINE

## 2023-01-26 PROCEDURE — 3017F COLORECTAL CA SCREEN DOC REV: CPT | Performed by: INTERNAL MEDICINE

## 2023-01-26 PROCEDURE — 99214 OFFICE O/P EST MOD 30 MIN: CPT | Performed by: INTERNAL MEDICINE

## 2023-01-26 PROCEDURE — 86304 IMMUNOASSAY TUMOR CA 125: CPT

## 2023-01-26 PROCEDURE — 2580000003 HC RX 258: Performed by: INTERNAL MEDICINE

## 2023-01-26 PROCEDURE — 99211 OFF/OP EST MAY X REQ PHY/QHP: CPT | Performed by: INTERNAL MEDICINE

## 2023-01-26 PROCEDURE — 6370000000 HC RX 637 (ALT 250 FOR IP): Performed by: INTERNAL MEDICINE

## 2023-01-26 PROCEDURE — 85025 COMPLETE CBC W/AUTO DIFF WBC: CPT

## 2023-01-26 PROCEDURE — 36591 DRAW BLOOD OFF VENOUS DEVICE: CPT

## 2023-01-26 PROCEDURE — G8427 DOCREV CUR MEDS BY ELIG CLIN: HCPCS | Performed by: INTERNAL MEDICINE

## 2023-01-26 PROCEDURE — 96375 TX/PRO/DX INJ NEW DRUG ADDON: CPT

## 2023-01-26 PROCEDURE — 6360000002 HC RX W HCPCS: Performed by: INTERNAL MEDICINE

## 2023-01-26 RX ORDER — SODIUM CHLORIDE 9 MG/ML
20 INJECTION, SOLUTION INTRAVENOUS ONCE
OUTPATIENT
Start: 2023-02-16 | End: 2023-02-16

## 2023-01-26 RX ORDER — WATER 1000 ML/1000ML
2.2 INJECTION, SOLUTION INTRAVENOUS ONCE
OUTPATIENT
Start: 2023-02-23 | End: 2023-02-23

## 2023-01-26 RX ORDER — SODIUM CHLORIDE 0.9 % (FLUSH) 0.9 %
5 SYRINGE (ML) INJECTION PRN
OUTPATIENT
Start: 2023-02-23

## 2023-01-26 RX ORDER — METHYLPREDNISOLONE SODIUM SUCCINATE 125 MG/2ML
125 INJECTION, POWDER, LYOPHILIZED, FOR SOLUTION INTRAMUSCULAR; INTRAVENOUS ONCE
OUTPATIENT
Start: 2023-02-16 | End: 2023-02-16

## 2023-01-26 RX ORDER — HEPARIN SODIUM (PORCINE) LOCK FLUSH IV SOLN 100 UNIT/ML 100 UNIT/ML
500 SOLUTION INTRAVENOUS PRN
Status: DISCONTINUED | OUTPATIENT
Start: 2023-01-26 | End: 2023-01-27 | Stop reason: HOSPADM

## 2023-01-26 RX ORDER — SODIUM CHLORIDE 9 MG/ML
INJECTION, SOLUTION INTRAVENOUS CONTINUOUS
OUTPATIENT
Start: 2023-02-16

## 2023-01-26 RX ORDER — SODIUM CHLORIDE 9 MG/ML
20 INJECTION, SOLUTION INTRAVENOUS ONCE
Status: COMPLETED | OUTPATIENT
Start: 2023-01-26 | End: 2023-01-26

## 2023-01-26 RX ORDER — SODIUM CHLORIDE 9 MG/ML
20 INJECTION, SOLUTION INTRAVENOUS ONCE
OUTPATIENT
Start: 2023-02-23 | End: 2023-02-23

## 2023-01-26 RX ORDER — DIPHENHYDRAMINE HYDROCHLORIDE 50 MG/ML
50 INJECTION INTRAMUSCULAR; INTRAVENOUS ONCE
OUTPATIENT
Start: 2023-02-16 | End: 2023-02-16

## 2023-01-26 RX ORDER — SODIUM CHLORIDE 0.9 % (FLUSH) 0.9 %
10 SYRINGE (ML) INJECTION PRN
Status: DISCONTINUED | OUTPATIENT
Start: 2023-01-26 | End: 2023-01-27 | Stop reason: HOSPADM

## 2023-01-26 RX ORDER — HEPARIN SODIUM (PORCINE) LOCK FLUSH IV SOLN 100 UNIT/ML 100 UNIT/ML
500 SOLUTION INTRAVENOUS PRN
OUTPATIENT
Start: 2023-02-23

## 2023-01-26 RX ORDER — WATER 1000 ML/1000ML
2.2 INJECTION, SOLUTION INTRAVENOUS ONCE
OUTPATIENT
Start: 2023-02-16 | End: 2023-02-16

## 2023-01-26 RX ORDER — FAMOTIDINE 10 MG/ML
20 INJECTION, SOLUTION INTRAVENOUS ONCE
OUTPATIENT
Start: 2023-02-23 | End: 2023-02-23

## 2023-01-26 RX ORDER — DIPHENHYDRAMINE HYDROCHLORIDE 50 MG/ML
50 INJECTION INTRAMUSCULAR; INTRAVENOUS ONCE
OUTPATIENT
Start: 2023-02-23 | End: 2023-02-23

## 2023-01-26 RX ORDER — EPINEPHRINE 1 MG/ML
0.3 INJECTION, SOLUTION, CONCENTRATE INTRAVENOUS PRN
OUTPATIENT
Start: 2023-02-16

## 2023-01-26 RX ORDER — SODIUM CHLORIDE 0.9 % (FLUSH) 0.9 %
5 SYRINGE (ML) INJECTION PRN
OUTPATIENT
Start: 2023-02-16

## 2023-01-26 RX ORDER — DEXAMETHASONE SODIUM PHOSPHATE 10 MG/ML
10 INJECTION INTRAMUSCULAR; INTRAVENOUS ONCE
Status: COMPLETED | OUTPATIENT
Start: 2023-01-26 | End: 2023-01-26

## 2023-01-26 RX ORDER — HEPARIN SODIUM (PORCINE) LOCK FLUSH IV SOLN 100 UNIT/ML 100 UNIT/ML
500 SOLUTION INTRAVENOUS PRN
OUTPATIENT
Start: 2023-02-16

## 2023-01-26 RX ORDER — SODIUM CHLORIDE 0.9 % (FLUSH) 0.9 %
10 SYRINGE (ML) INJECTION PRN
OUTPATIENT
Start: 2023-02-23

## 2023-01-26 RX ORDER — EPINEPHRINE 1 MG/ML
0.3 INJECTION, SOLUTION, CONCENTRATE INTRAVENOUS PRN
OUTPATIENT
Start: 2023-02-23

## 2023-01-26 RX ORDER — POTASSIUM CHLORIDE 20 MEQ/1
20 TABLET, EXTENDED RELEASE ORAL
Status: COMPLETED | OUTPATIENT
Start: 2023-01-26 | End: 2023-01-26

## 2023-01-26 RX ORDER — SODIUM CHLORIDE 9 MG/ML
INJECTION, SOLUTION INTRAVENOUS CONTINUOUS
OUTPATIENT
Start: 2023-02-23

## 2023-01-26 RX ORDER — METHYLPREDNISOLONE SODIUM SUCCINATE 125 MG/2ML
125 INJECTION, POWDER, LYOPHILIZED, FOR SOLUTION INTRAMUSCULAR; INTRAVENOUS ONCE
OUTPATIENT
Start: 2023-02-23 | End: 2023-02-23

## 2023-01-26 RX ORDER — SODIUM CHLORIDE 0.9 % (FLUSH) 0.9 %
10 SYRINGE (ML) INJECTION PRN
OUTPATIENT
Start: 2023-02-16

## 2023-01-26 RX ORDER — FAMOTIDINE 10 MG/ML
20 INJECTION, SOLUTION INTRAVENOUS ONCE
OUTPATIENT
Start: 2023-02-16 | End: 2023-02-16

## 2023-01-26 RX ADMIN — SODIUM CHLORIDE, PRESERVATIVE FREE 10 ML: 5 INJECTION INTRAVENOUS at 12:27

## 2023-01-26 RX ADMIN — POTASSIUM CHLORIDE 20 MEQ: 1500 TABLET, EXTENDED RELEASE ORAL at 11:07

## 2023-01-26 RX ADMIN — GEMCITABINE HYDROCHLORIDE 1700 MG: 1 INJECTION, SOLUTION INTRAVENOUS at 11:37

## 2023-01-26 RX ADMIN — POTASSIUM CHLORIDE 20 MEQ: 1500 TABLET, EXTENDED RELEASE ORAL at 11:37

## 2023-01-26 RX ADMIN — SODIUM CHLORIDE 100 ML/HR: 9 INJECTION, SOLUTION INTRAVENOUS at 09:24

## 2023-01-26 RX ADMIN — SODIUM CHLORIDE, PRESERVATIVE FREE 10 ML: 5 INJECTION INTRAVENOUS at 09:14

## 2023-01-26 RX ADMIN — DEXAMETHASONE SODIUM PHOSPHATE 10 MG: 10 INJECTION INTRAMUSCULAR; INTRAVENOUS at 11:04

## 2023-01-26 RX ADMIN — Medication 500 UNITS: at 12:27

## 2023-01-26 NOTE — FLOWSHEET NOTE
SPIRITUAL CARE PROGRESS NOTE: Outpatient Oncology Care at [phone]    Spiritual Assessment: Patient was in the treatment cubicle of the infusion clinic. Patient acknowledged Avanell Breeze. She shared how she was doing, noting that she has been \"tired. \" She appeared fatigued and did not engage much in conversation nor give much eye contact. She was eating a sandwich and chips. She asked writer for a cup of water. When asked if she would like to talk about anything with writer, she shared that she cries when she talks about \"Omar,\" Pt's son who  last year. She began to cry. She did not respond as writer offered words of comfort. She shared that she does not have anyone with whom she can talk about her grief. When asked if she would be open to talking with [facility], [facility], she indicated that she would be. Pt returned to drinking and focusing on her water and denied having any additional needs. Intervention: Writer greeted Patient and inquired how she was doing. Writer provided supportive presence and empathy as Pt expressed her grief. Writer offered words of comfort and care, affirming Pt's close relationship with son. Writer brought water to Pt. Writer asked Pt if she would be receptive to seeing [facility] for counseling. Writer told Pt she would pass along a referral to [facility]. Outcome: Patient expressed her feelings connected to her grief about her son. Patient indicated her openness to counseling support to help her cope. Plan: Chaplains will remain available to provide emotional and spiritual support as needed. Writer will communicate with Patient's nurse navigator and pass along referral for counseling to MARK Medrano.     23 [id number]   Encounter Summary   Service Provided For: Patient   Referral/Consult From: Karl;Nurse   Support System Family members; Children   Last Encounter  23   Complexity of Encounter Moderate   Begin Time 1020   End Time  1030   Total Time Calculated 10 min   Encounter    Type Follow up   Spiritual/Emotional needs   Type Spiritual Support   Assessment/Intervention/Outcome   Assessment Impaired resilience;Passive; Tearful   Intervention Sustaining Presence/Ministry of presence; Active listening;Explored/Affirmed feelings, thoughts, concerns;Grief Care   Outcome Refused/Declined;Expressed feelings, needs, and concerns   Plan and Referrals   Plan/Referrals Continue Support (comment); Referred to (Comment)  (Nurse Navigator and )     Electronically signed by Bella Bush, Oncology Outpatient [place] [address], [facility] Radiation Oncology  [phone]  1/26/2023  11:34 AM

## 2023-01-26 NOTE — PROGRESS NOTES
PT ARRIVES PER AMB PER SELF WITH WALKER AND PT SLOW TO RESPOND, HAD TO GO UP TO PT AND ASK HER TO COME TO TREATMENT AREA, THEN PT STATES  (PT 1/2 HOUR LATE AND HAD BEEN IN WAITING AREA 20 MIN) I CAN'T FIND MY CREDIT CARD. TRIED TO ESCORT PT TO TREATMENT AREA AND PT WENT BACK TO FRONT WINDOW TO ASK ABOUT HER CREDIT CARD. THEN WRITER TRIED AGAIN TO BRING PT TO TREATMENT AREA AT 0910 AND FINALLY PT FOLLOWING WRITER. LABS AND ORDERS REVIEWED AND K 3.1 TODAY. PT GIVEN 40 MEQ KCL  PO AND PT EATING PRIOR, NAPPING NOW. PT SEEN PER MD AND OK TO PROCEED. PT TOLERATED PREMED AND CHEMO AND KCL WELL AND NO REACTIONS OR COMPLAINTS AND BLOOD RETURN PRESENT THROUGHOUT INFUSION AND NS FLUSHING LINE BEFORE AND AFTER AND PT DISCHARGED PER AMB PER SELF WITH WALKER WITH AVS FROM  WITH NEXT APPTS.

## 2023-01-26 NOTE — TELEPHONE ENCOUNTER
Brad Deluca MD VISIT & TX   Potassium replacement per protocol  Proceed with chemo as ordered  Labs before chemo including tumor marker   RV 4-6 weeks  MD VISIT 3/9/23 @ 10:30AM TX @ 10AM  AVS PRINTED W/ INSTRUCTIONS AND GIVEN TO PT ON EXIT

## 2023-01-26 NOTE — PATIENT INSTRUCTIONS
Potassium replacement per protocol  Proceed with chemo as ordered  Labs before chemo including tumor marker   RV 4-6 weeks

## 2023-01-27 NOTE — PROGRESS NOTES
_      Chief Complaint   Patient presents with    Follow-up     DIAGNOSIS:       Recurrent ovarian cancer. Original diagnosis 2005 with multiple recurrences. Diffuse metastasis. CURRENT THERAPY:         Doxil/ Avastin started 9/18/2020. Avastin was discontinued due to recent GI bleeding. Status post recent vascular embolization  S/p seizure disorder. Gemzar started 2/26/2021. Interrupted due to hospitalization and problem with compliance. Evidence of disease progression on CT scan 2/22/22  Added Carboplatin to Gemzar March 2022      BRIEF CASE HISTORY:      Ms. Tanya Escamilla is a very pleasant 59 y.o. female with history of multiple co morbidities as listed. The patient seen in consultation for ovarian cancer with multiple recurrences. 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 She had surgical debulking and she had systemic treatment with Taxol and carboplatin for 6 cycles. Patient was in remission for about 2 years. She had a relapse in 2007 and treated with topotecan with good results. Patient relapsed again in 2008 and was treated with Taxol carboplatin. After 3 cycles of systemic chemotherapy she had problems with carboplatin so she finished 3 more cycles with Taxol. She did well again for 2 to 3 years until she had a relapse in 2012 and she had intraperitoneal chemotherapy treatment with Taxol. Patient was last seen by her oncologist in 2014 at which time she had relatively stable disease with normal tumor marker and no significant abnormal images. Patient moved to [place] [address] after that and she was lost for oncology follow-ups. Patient was recently evaluated again here in [facility] because of abdominal discomfort. Re imaging confirmed metastatic relapse. Biopsy confirmed ovarian cancer recurrence.  Scan showed extensive intraabdominal and splenic involvement and lung mets. She has no symptoms at the present time. Patient denies smoking or alcohol drinking.l    INTERIM HISTORY:    patient is seen for follow up recurrent ovarian cancer. Started on Gemzar. Treatment was inturrupted due to sickness and hospitalization. No melena or hematochezia. No hematemesis. No recent seizure activities. Labs are stable as well. No severe anemia. No active bleeding. PAST MEDICAL HISTORY: has a past medical history of Anemia, Bleeding, Cervical cancer ([facility] Depression, Diabetes mellitus ([facility] GERD (gastroesophageal reflux disease), Hx of blood clots, Hypertension, Metastatic cancer ([facility] Ovarian cancer ([facility] Post chemo evaluation, PRES (posterior reversible encephalopathy syndrome), and Splenic lesion. PAST SURGICAL HISTORY: has a past surgical history that includes Hysterectomy, total abdominal; Port Surgery; Tonsillectomy; IR PORT PLACEMENT > 5 YEARS (08/24/2020); Anus surgery; Abscess Drainage (2013); colectomy (03/2013); IR EMBOLIZATION HEMORRHAGE (10/05/2020); and Cardiac catheterization. CURRENT MEDICATIONS:  has a current medication list which includes the following prescription(s): morphine, morphine, oxycodone-acetaminophen, sertraline, lamotrigine, levetiracetam, lacosamide, pantoprazole, gnp melatonin maximum strength, zeasorb-af, handicap placard, ondansetron, loperamide, apixaban, amlodipine, metoprolol tartrate, stimulant laxative, ferosul, acidophilus, dicyclomine, senna, benzonatate, and hydrocortisone, and the following Facility-Administered Medications: sodium chloride flush and heparin flush. ALLERGIES:  is allergic to carboplatin and ceftriaxone. FAMILY HISTORY: Negative for any hematological or oncological conditions. SOCIAL HISTORY:  reports that she has been smoking cigarettes. She has a 20.00 pack-year smoking history. She has never used smokeless tobacco. She reports that she does not currently use alcohol.  She reports that she does not use drugs. REVIEW OF SYSTEMS:     General: No weakness or fatigue. No unanticipated weight loss or decreased appetite. No fever or chills. Eyes: No blurred vision, eye pain or double vision. Ears: No hearing problems or drainage. No tinnitus. Throat: No sore throat, problems with swallowing or dysphagia. Respiratory: No cough, sputum or hemoptysis. No shortness of breath. No pleuritic chest pain. Cardiovascular: No chest pain, orthopnea or PND. No lower extremity edema. No palpitation. Gastrointestinal: As above. Genitourinary: No dysuria, hematuria, frequency or urgency. Musculoskeletal: No muscle aches or pains. No limitation of movement. No back pain. No gait disturbance, No joint complaints. Dermatologic: No skin rashes or pruritus. No skin lesions or discolorations. Psychiatric: No depression, anxiety, or stress or signs of schizophrenia. No change in mood or affect. Hematologic: No history of bleeding tendency. No bruises or ecchymosis. No history of clotting problems. Infectious disease: No fever, chills or frequent infections. Endocrine: No polydipsia or polyuria. No temperature intolerance. Neurologic: No headaches or dizziness. No weakness or numbness of the extremities. No changes in balance, coordination,  memory, mentation, behavior. Allergic/Immunologic: No nasal congestion or hives. No repeated infections. PHYSICAL EXAM:  The patient is not in acute distress. Vital signs: Blood pressure 118/63, pulse (!) 101, temperature 98.1 °F (36.7 °C), temperature source Oral, resp. rate 16, weight 140 lb 11.2 oz (63.8 kg).      General appearance - well appearing, not in pain or distress  Mental status - good mood, alert and oriented  Eyes - pupils equal and reactive, extraocular eye movements intact  Ears - bilateral TM's and external ear canals normal  Nose - normal and patent, no erythema, discharge or polyps  Mouth - mucous membranes moist, pharynx normal without lesions  Neck - supple, no significant adenopathy  Lymphatics - no palpable lymphadenopathy, no hepatosplenomegaly  Chest - clear to auscultation, no wheezes, rales or rhonchi, symmetric air entry  Heart - normal rate, regular rhythm, normal S1, S2, no murmurs, rubs, clicks or gallops  Abdomen - soft, nontender, nondistended, no masses or organomegaly  Neurological - alert, oriented, normal speech, no focal findings or movement disorder noted  Musculoskeletal - no joint tenderness, deformity or swelling  Extremities - peripheral pulses normal, 2+ pedal edema, no clubbing or cyanosis  Skin - normal coloration and turgor, no rashes, no suspicious skin lesions noted     Review of Diagnostic data:   Lab Results   Component Value Date    WBC 9.0 01/26/2023    HGB 12.3 01/26/2023    HCT 40.7 01/26/2023    .0 01/26/2023     (H) 01/26/2023       Chemistry        Component Value Date/Time     01/26/2023 0926    K 3.1 (L) 01/26/2023 0926     01/26/2023 0926    CO2 28 01/26/2023 0926    BUN 11 01/26/2023 0926    CREATININE 0.70 01/26/2023 0926        Component Value Date/Time    CALCIUM 8.5 (L) 01/26/2023 0926    ALKPHOS 79 01/26/2023 0926    AST 12 01/26/2023 0926    ALT <5 (L) 01/26/2023 0926    BILITOT 0.1 (L) 01/26/2023 0926          Lab Results   Component Value Date     490 (H) 01/26/2023         IMPRESSION:   Recurrent ovarian cancer. Original diagnosis 2005 with multiple recurrences. Diffuse metastasis. Recent active intra-abdominal bleeding due to splenic mass with GI infiltration. Status post embolization  S/p seizure disorder. lymphedema    PLAN:   S/p multiple hospitalization for different problems as above. Currently patient is relatively stable but in rehab with LE edema. Likely lymphedema. Much better. Continues follow up in lymphedema clinic. Chemo tolerated well. Continue treatment.    I reviewed labs as stated above and discussed them with the patient. Labs are adequate for chemotherapy treatment. We will proceed with treatment today. Patient was reminded about potential side effects to treatment. We will continue to monitor labs. Monitor response and toxicity. Patient's questions were answered to the best of her satisfaction and she verbalized full understanding and agreement.

## 2023-01-29 DIAGNOSIS — C56.9 MALIGNANT NEOPLASM OF OVARY, UNSPECIFIED LATERALITY (HCC): ICD-10-CM

## 2023-01-29 DIAGNOSIS — C79.51 CANCER, METASTATIC TO BONE (HCC): ICD-10-CM

## 2023-01-30 ENCOUNTER — TELEPHONE (OUTPATIENT)
Dept: ONCOLOGY | Age: 58
End: 2023-01-30

## 2023-01-30 NOTE — TELEPHONE ENCOUNTER
Name: Isiah Babin [facility]  : 1965  MRN: [id number]    Oncology Navigation Follow-Up Note    Contact Type:  Telephone    Notes: Attempted to contact pt for f/u call, no answer, VM left instructed pt may contact writer prn. Will continue to follow.     Electronically signed by Rohit Leal RN on 2023 at 9:30 AM

## 2023-01-31 ENCOUNTER — HOSPITAL ENCOUNTER (OUTPATIENT)
Facility: MEDICAL CENTER | Age: 58
End: 2023-01-31

## 2023-01-31 RX ORDER — MORPHINE SULFATE 15 MG/1
15 TABLET, FILM COATED, EXTENDED RELEASE ORAL 2 TIMES DAILY
Qty: 60 TABLET | Refills: 0 | Status: SHIPPED | OUTPATIENT
Start: 2023-01-31 | End: 2023-03-02

## 2023-01-31 RX ORDER — OXYCODONE HYDROCHLORIDE AND ACETAMINOPHEN 5; 325 MG/1; MG/1
TABLET ORAL
Qty: 180 TABLET | Refills: 0 | Status: SHIPPED | OUTPATIENT
Start: 2023-01-31 | End: 2023-03-02

## 2023-01-31 RX ORDER — LORAZEPAM 1 MG/1
1 TABLET ORAL EVERY 8 HOURS PRN
Qty: 90 TABLET | Refills: 0 | Status: SHIPPED | OUTPATIENT
Start: 2023-01-31 | End: 2023-03-02

## 2023-01-31 RX ORDER — MORPHINE SULFATE 30 MG/1
TABLET, FILM COATED, EXTENDED RELEASE ORAL
Qty: 60 TABLET | Refills: 0 | Status: SHIPPED | OUTPATIENT
Start: 2023-01-31 | End: 2023-03-02

## 2023-01-31 NOTE — TELEPHONE ENCOUNTER
Electronic request for refill per pharmacy. Last prescribed Ativan 11/15/22. Last prescribed narcotics 1/3/23. Routed to MD for review.

## 2023-02-02 ENCOUNTER — TELEPHONE (OUTPATIENT)
Dept: INFUSION THERAPY | Facility: MEDICAL CENTER | Age: 58
End: 2023-02-02

## 2023-02-02 ENCOUNTER — TELEPHONE (OUTPATIENT)
Dept: ONCOLOGY | Age: 58
End: 2023-02-02

## 2023-02-06 ENCOUNTER — TELEPHONE (OUTPATIENT)
Dept: ONCOLOGY | Age: 58
End: 2023-02-06

## 2023-02-06 NOTE — TELEPHONE ENCOUNTER
Name: Heriberto Richard [place]  : 1965  MRN: [id number]    Oncology Navigation Follow-Up Note    Contact Type:  Telephone    Notes: Attempted to contact pt for f/u call, no answer, VM left instructed pt may contact writer prn. Will continue to follow.     Electronically signed by Froilan Renee RN on 2023 at 9:44 AM

## 2023-02-09 ENCOUNTER — TELEPHONE (OUTPATIENT)
Dept: ONCOLOGY | Age: 58
End: 2023-02-09

## 2023-02-09 NOTE — TELEPHONE ENCOUNTER
received missed call from patient.  called back. Patient wanting to check infusion schedule.  went over schedule with patient.

## 2023-02-09 NOTE — TELEPHONE ENCOUNTER
Looks like these medications were discontinued a while back at a previous hospitalization, is she having more swelling lately?

## 2023-02-13 ENCOUNTER — HOSPITAL ENCOUNTER (OUTPATIENT)
Facility: MEDICAL CENTER | Age: 58
End: 2023-02-13

## 2023-02-13 RX ORDER — POTASSIUM CHLORIDE 750 MG/1
CAPSULE, EXTENDED RELEASE ORAL
Qty: 30 CAPSULE | Refills: 3 | OUTPATIENT
Start: 2023-02-13

## 2023-02-13 RX ORDER — FUROSEMIDE 20 MG/1
TABLET ORAL
Qty: 30 TABLET | Refills: 3 | Status: SHIPPED | OUTPATIENT
Start: 2023-02-13

## 2023-02-13 RX ORDER — POTASSIUM CHLORIDE 750 MG/1
10 TABLET, EXTENDED RELEASE ORAL 2 TIMES DAILY WITH MEALS
Qty: 60 TABLET | Refills: 3 | Status: SHIPPED | OUTPATIENT
Start: 2023-02-13

## 2023-02-13 RX ORDER — FUROSEMIDE 20 MG/1
TABLET ORAL
Qty: 30 TABLET | Refills: 3 | OUTPATIENT
Start: 2023-02-13

## 2023-02-16 ENCOUNTER — HOSPITAL ENCOUNTER (OUTPATIENT)
Dept: INFUSION THERAPY | Facility: MEDICAL CENTER | Age: 58
Discharge: HOME | End: 2023-02-16

## 2023-02-16 ENCOUNTER — TELEPHONE (OUTPATIENT)
Dept: INFUSION THERAPY | Facility: MEDICAL CENTER | Age: 58
End: 2023-02-16

## 2023-02-16 NOTE — TELEPHONE ENCOUNTER
Attempted to call patient's daughter Jeannine Arredondo after message left saying patient has rock hard mass increasing in size on her left side. She would like Dr. Amanda William to be aware. Have made 3 attempts to speak with Jeanninevenkata Arredondo but calls go directly to voice mail. Last message left I explained need for further information and the need to speak directly with her. Attempted to call patient but also went directly to voice mail. Will continue to attempt to speak directly with Jeannine Arredondo.

## 2023-02-17 ENCOUNTER — TELEPHONE (OUTPATIENT)
Dept: ONCOLOGY | Age: 58
End: 2023-02-17

## 2023-02-17 NOTE — TELEPHONE ENCOUNTER
Name: Eliazar Delmar  : 1965  MRN: [id number]    Oncology Navigation Follow-Up Note    Contact Type:  Telephone    Notes: Upon review of chart noted pt no show for  tx. Attempted to contact pt, no answer, VM left requested contact writer.   Will continue to follow    Electronically signed by Eleazar Lundy RN on 2023 at 2:29 PM

## 2023-02-19 ENCOUNTER — HOSPITAL ENCOUNTER (INPATIENT)
Age: 58
LOS: 14 days | Discharge: HOME | DRG: 056 | End: 2023-03-06
Attending: EMERGENCY MEDICINE | Admitting: INTERNAL MEDICINE
Payer: COMMERCIAL

## 2023-02-19 ENCOUNTER — APPOINTMENT (OUTPATIENT)
Dept: GENERAL RADIOLOGY | Age: 58
DRG: 056 | End: 2023-02-19
Payer: COMMERCIAL

## 2023-02-19 ENCOUNTER — APPOINTMENT (OUTPATIENT)
Dept: CT IMAGING | Age: 58
DRG: 056 | End: 2023-02-19
Payer: COMMERCIAL

## 2023-02-19 DIAGNOSIS — G40.909 SEIZURE DISORDER (HCC): ICD-10-CM

## 2023-02-19 DIAGNOSIS — C56.9 MALIGNANT NEOPLASM OF OVARY, UNSPECIFIED LATERALITY (HCC): ICD-10-CM

## 2023-02-19 DIAGNOSIS — G47.00 INSOMNIA, UNSPECIFIED TYPE: ICD-10-CM

## 2023-02-19 DIAGNOSIS — R41.82 ALTERED MENTAL STATUS, UNSPECIFIED ALTERED MENTAL STATUS TYPE: Primary | ICD-10-CM

## 2023-02-19 DIAGNOSIS — C79.51 CANCER, METASTATIC TO BONE (HCC): ICD-10-CM

## 2023-02-19 LAB
ABSOLUTE EOS #: <0.03 K/UL (ref 0–0.44)
ABSOLUTE IMMATURE GRANULOCYTE: 0.05 K/UL (ref 0–0.3)
ABSOLUTE LYMPH #: 0.8 K/UL (ref 1.1–3.7)
ABSOLUTE MONO #: 0.5 K/UL (ref 0.1–1.2)
ANION GAP SERPL CALCULATED.3IONS-SCNC: 18 MMOL/L (ref 9–17)
ANION GAP: 14 MMOL/L (ref 7–16)
BASOPHILS # BLD: 1 % (ref 0–2)
BASOPHILS ABSOLUTE: 0.11 K/UL (ref 0–0.2)
BNP SERPL-MCNC: 579 PG/ML
BUN SERPL-MCNC: 7 MG/DL (ref 6–20)
CALCIUM SERPL-MCNC: 8.4 MG/DL (ref 8.6–10.4)
CARBOXYHEMOGLOBIN: 3.8 % (ref 0–5)
CHLORIDE SERPL-SCNC: 94 MMOL/L (ref 98–107)
CK SERPL-CCNC: 88 U/L (ref 26–192)
CO2 SERPL-SCNC: 23 MMOL/L (ref 20–31)
CREAT SERPL-MCNC: 0.63 MG/DL (ref 0.5–0.9)
EGFR, POC: >60 ML/MIN/1.73M2
EOSINOPHILS RELATIVE PERCENT: 0 % (ref 1–4)
GFR SERPL CREATININE-BSD FRML MDRD: >60 ML/MIN/1.73M2
GLUCOSE BLD-MCNC: 97 MG/DL (ref 74–100)
GLUCOSE SERPL-MCNC: 181 MG/DL (ref 70–99)
HCO3 VENOUS: 27.9 MMOL/L (ref 22–29)
HCT VFR BLD AUTO: 42 % (ref 36.3–47.1)
HGB BLD-MCNC: 12.5 G/DL (ref 11.9–15.1)
IMMATURE GRANULOCYTES: 1 %
KEPPRA: 17 UG/ML
LAMOTRIGINE LEVEL: 5.2 UG/ML (ref 3–15)
LYMPHOCYTES # BLD: 8 % (ref 24–43)
MAGNESIUM SERPL-MCNC: 1.5 MG/DL (ref 1.6–2.6)
MCH RBC QN AUTO: 30.2 PG (ref 25.2–33.5)
MCHC RBC AUTO-ENTMCNC: 29.8 G/DL (ref 28.4–34.8)
MCV RBC AUTO: 101.4 FL (ref 82.6–102.9)
MONOCYTES # BLD: 5 % (ref 3–12)
MYOGLOBIN SERPL-MCNC: 102 NG/ML (ref 25–58)
NRBC AUTOMATED: 0 PER 100 WBC
O2 SAT, VEN: 85 % (ref 60–85)
PCO2, VEN: 41 MM HG (ref 41–51)
PDW BLD-RTO: 19.5 % (ref 11.8–14.4)
PH VENOUS: 7.44 (ref 7.32–7.43)
PLATELET # BLD AUTO: 578 K/UL (ref 138–453)
PMV BLD AUTO: 8.9 FL (ref 8.1–13.5)
PO2, VEN: 48.1 MM HG (ref 30–50)
POC BUN: 4 MG/DL (ref 8–26)
POC CHLORIDE: 101 MMOL/L (ref 98–107)
POC CREATININE: 0.53 MG/DL (ref 0.51–1.19)
POC HEMATOCRIT: 39 % (ref 36–46)
POC HEMOGLOBIN: 13.1 G/DL (ref 12–16)
POC IONIZED CALCIUM: 1.06 MMOL/L (ref 1.15–1.33)
POC LACTIC ACID: 0.76 MMOL/L (ref 0.56–1.39)
POC POTASSIUM: 3.1 MMOL/L (ref 3.5–4.5)
POC SODIUM: 141 MMOL/L (ref 138–146)
POC TCO2: 27 MMOL/L (ref 22–30)
POSITIVE BASE EXCESS, VEN: 3 (ref 0–3)
POTASSIUM SERPL-SCNC: 3.5 MMOL/L (ref 3.7–5.3)
RBC # BLD: 4.14 M/UL (ref 3.95–5.11)
RBC # BLD: ABNORMAL 10*6/UL
SEG NEUTROPHILS: 85 % (ref 36–65)
SEGMENTED NEUTROPHILS ABSOLUTE COUNT: 8.05 K/UL (ref 1.5–8.1)
SODIUM SERPL-SCNC: 135 MMOL/L (ref 135–144)
TROPONIN I SERPL DL<=0.01 NG/ML-MCNC: 31 NG/L (ref 0–14)
TROPONIN I SERPL DL<=0.01 NG/ML-MCNC: 31 NG/L (ref 0–14)
WBC # BLD AUTO: 9.5 K/UL (ref 3.5–11.3)

## 2023-02-19 PROCEDURE — 84520 ASSAY OF UREA NITROGEN: CPT

## 2023-02-19 PROCEDURE — 85014 HEMATOCRIT: CPT

## 2023-02-19 PROCEDURE — 82550 ASSAY OF CK (CPK): CPT

## 2023-02-19 PROCEDURE — 84443 ASSAY THYROID STIM HORMONE: CPT

## 2023-02-19 PROCEDURE — 83880 ASSAY OF NATRIURETIC PEPTIDE: CPT

## 2023-02-19 PROCEDURE — 83605 ASSAY OF LACTIC ACID: CPT

## 2023-02-19 PROCEDURE — 82375 ASSAY CARBOXYHB QUANT: CPT

## 2023-02-19 PROCEDURE — 82803 BLOOD GASES ANY COMBINATION: CPT

## 2023-02-19 PROCEDURE — 82330 ASSAY OF CALCIUM: CPT

## 2023-02-19 PROCEDURE — 93005 ELECTROCARDIOGRAM TRACING: CPT

## 2023-02-19 PROCEDURE — 84484 ASSAY OF TROPONIN QUANT: CPT

## 2023-02-19 PROCEDURE — 6360000004 HC RX CONTRAST MEDICATION

## 2023-02-19 PROCEDURE — 82947 ASSAY GLUCOSE BLOOD QUANT: CPT

## 2023-02-19 PROCEDURE — 71260 CT THORAX DX C+: CPT

## 2023-02-19 PROCEDURE — 80177 DRUG SCRN QUAN LEVETIRACETAM: CPT

## 2023-02-19 PROCEDURE — 80175 DRUG SCREEN QUAN LAMOTRIGINE: CPT

## 2023-02-19 PROCEDURE — 2580000003 HC RX 258

## 2023-02-19 PROCEDURE — 96376 TX/PRO/DX INJ SAME DRUG ADON: CPT

## 2023-02-19 PROCEDURE — 70450 CT HEAD/BRAIN W/O DYE: CPT

## 2023-02-19 PROCEDURE — 82565 ASSAY OF CREATININE: CPT

## 2023-02-19 PROCEDURE — 83735 ASSAY OF MAGNESIUM: CPT

## 2023-02-19 PROCEDURE — 96366 THER/PROPH/DIAG IV INF ADDON: CPT

## 2023-02-19 PROCEDURE — 85025 COMPLETE CBC W/AUTO DIFF WBC: CPT

## 2023-02-19 PROCEDURE — 6360000002 HC RX W HCPCS

## 2023-02-19 PROCEDURE — 99285 EMERGENCY DEPT VISIT HI MDM: CPT

## 2023-02-19 PROCEDURE — 74177 CT ABD & PELVIS W/CONTRAST: CPT

## 2023-02-19 PROCEDURE — 83874 ASSAY OF MYOGLOBIN: CPT

## 2023-02-19 PROCEDURE — 80048 BASIC METABOLIC PNL TOTAL CA: CPT

## 2023-02-19 PROCEDURE — 96365 THER/PROPH/DIAG IV INF INIT: CPT

## 2023-02-19 PROCEDURE — 80051 ELECTROLYTE PANEL: CPT

## 2023-02-19 PROCEDURE — 96375 TX/PRO/DX INJ NEW DRUG ADDON: CPT

## 2023-02-19 RX ORDER — MAGNESIUM SULFATE IN WATER 40 MG/ML
2000 INJECTION, SOLUTION INTRAVENOUS ONCE
Status: COMPLETED | OUTPATIENT
Start: 2023-02-19 | End: 2023-02-19

## 2023-02-19 RX ORDER — LORAZEPAM 2 MG/ML
1 INJECTION INTRAMUSCULAR ONCE
Status: COMPLETED | OUTPATIENT
Start: 2023-02-19 | End: 2023-02-19

## 2023-02-19 RX ORDER — POTASSIUM CHLORIDE 7.45 MG/ML
10 INJECTION INTRAVENOUS
Status: COMPLETED | OUTPATIENT
Start: 2023-02-19 | End: 2023-02-20

## 2023-02-19 RX ORDER — 0.9 % SODIUM CHLORIDE 0.9 %
500 INTRAVENOUS SOLUTION INTRAVENOUS ONCE
Status: COMPLETED | OUTPATIENT
Start: 2023-02-19 | End: 2023-02-19

## 2023-02-19 RX ORDER — SODIUM THIOSULFATE 250 MG/ML
12.5 INJECTION, SOLUTION INTRAVENOUS ONCE
Status: DISCONTINUED | OUTPATIENT
Start: 2023-02-19 | End: 2023-02-19

## 2023-02-19 RX ADMIN — SODIUM CHLORIDE 500 ML: 0.9 INJECTION, SOLUTION INTRAVENOUS at 18:35

## 2023-02-19 RX ADMIN — MAGNESIUM SULFATE HEPTAHYDRATE 2000 MG: 40 INJECTION, SOLUTION INTRAVENOUS at 20:13

## 2023-02-19 RX ADMIN — POTASSIUM CHLORIDE 10 MEQ: 10 INJECTION, SOLUTION INTRAVENOUS at 23:42

## 2023-02-19 RX ADMIN — IOPAMIDOL 100 ML: 755 INJECTION, SOLUTION INTRAVENOUS at 21:29

## 2023-02-19 RX ADMIN — POTASSIUM CHLORIDE 10 MEQ: 10 INJECTION, SOLUTION INTRAVENOUS at 22:17

## 2023-02-19 RX ADMIN — LORAZEPAM 1 MG: 2 INJECTION INTRAMUSCULAR; INTRAVENOUS at 19:46

## 2023-02-19 RX ADMIN — POTASSIUM CHLORIDE 10 MEQ: 10 INJECTION, SOLUTION INTRAVENOUS at 19:54
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
## 2023-02-20 ENCOUNTER — APPOINTMENT (OUTPATIENT)
Dept: GENERAL RADIOLOGY | Age: 58
DRG: 056 | End: 2023-02-20
Payer: COMMERCIAL

## 2023-02-20 ENCOUNTER — TELEPHONE (OUTPATIENT)
Dept: ONCOLOGY | Age: 58
End: 2023-02-20

## 2023-02-20 ENCOUNTER — APPOINTMENT (OUTPATIENT)
Dept: MRI IMAGING | Age: 58
DRG: 056 | End: 2023-02-20
Payer: COMMERCIAL

## 2023-02-20 PROBLEM — R56.9 SEIZURE (HCC): Status: ACTIVE | Noted: 2023-02-20

## 2023-02-20 PROBLEM — T79.6XXA TRAUMATIC RHABDOMYOLYSIS (HCC): Status: ACTIVE | Noted: 2023-02-20

## 2023-02-20 PROBLEM — Z51.5 PALLIATIVE CARE ENCOUNTER: Status: ACTIVE | Noted: 2020-08-21

## 2023-02-20 PROBLEM — R40.0 SOMNOLENCE: Status: ACTIVE | Noted: 2023-02-20

## 2023-02-20 PROBLEM — K56.51 INTESTINAL ADHESIONS WITH PARTIAL OBSTRUCTION (HCC): Status: ACTIVE | Noted: 2023-02-20

## 2023-02-20 PROBLEM — K80.20 CALCULUS OF GALLBLADDER WITHOUT CHOLECYSTITIS WITHOUT OBSTRUCTION: Status: ACTIVE | Noted: 2023-02-20

## 2023-02-20 LAB
ALBUMIN SERPL-MCNC: 2.9 G/DL (ref 3.5–5.2)
ALBUMIN/GLOBULIN RATIO: 1.2 (ref 1–2.5)
ALP SERPL-CCNC: 66 U/L (ref 35–104)
ALT SERPL-CCNC: 6 U/L (ref 5–33)
ANION GAP SERPL CALCULATED.3IONS-SCNC: 10 MMOL/L (ref 9–17)
ANION GAP SERPL CALCULATED.3IONS-SCNC: 12 MMOL/L (ref 9–17)
ANION GAP: 16 MMOL/L (ref 7–16)
AST SERPL-CCNC: 26 U/L
BILIRUB SERPL-MCNC: 0.2 MG/DL (ref 0.3–1.2)
BILIRUBIN URINE: NEGATIVE
BILIRUBIN URINE: NEGATIVE
BUN SERPL-MCNC: 6 MG/DL (ref 6–20)
CALCIUM SERPL-MCNC: 8.2 MG/DL (ref 8.6–10.4)
CHLORIDE SERPL-SCNC: 103 MMOL/L (ref 98–107)
CHLORIDE SERPL-SCNC: 103 MMOL/L (ref 98–107)
CO2 SERPL-SCNC: 23 MMOL/L (ref 20–31)
CO2 SERPL-SCNC: 26 MMOL/L (ref 20–31)
COLOR: ABNORMAL
COLOR: YELLOW
COMMENT UA: ABNORMAL
CREAT SERPL-MCNC: 0.23 MG/DL (ref 0.5–0.9)
DATE, STOOL #1: ABNORMAL
EGFR, POC: >60 ML/MIN/1.73M2
EKG ATRIAL RATE: 112 BPM
EKG P AXIS: 84 DEGREES
EKG P-R INTERVAL: 182 MS
EKG Q-T INTERVAL: 320 MS
EKG QRS DURATION: 78 MS
EKG QTC CALCULATION (BAZETT): 436 MS
EKG R AXIS: 55 DEGREES
EKG T AXIS: 72 DEGREES
EKG VENTRICULAR RATE: 112 BPM
EPITHELIAL CELLS UA: NORMAL /HPF (ref 0–5)
GFR SERPL CREATININE-BSD FRML MDRD: >60 ML/MIN/1.73M2
GLUCOSE BLD-MCNC: 103 MG/DL (ref 65–105)
GLUCOSE BLD-MCNC: 109 MG/DL (ref 65–105)
GLUCOSE BLD-MCNC: 110 MG/DL (ref 65–105)
GLUCOSE BLD-MCNC: 113 MG/DL (ref 65–105)
GLUCOSE BLD-MCNC: 186 MG/DL (ref 74–100)
GLUCOSE BLD-MCNC: 97 MG/DL (ref 65–105)
GLUCOSE SERPL-MCNC: 98 MG/DL (ref 70–99)
GLUCOSE UR STRIP.AUTO-MCNC: NEGATIVE MG/DL
GLUCOSE UR STRIP.AUTO-MCNC: NEGATIVE MG/DL
HCO3 VENOUS: 27.1 MMOL/L (ref 22–29)
HCT VFR BLD AUTO: 37.7 % (ref 36.3–47.1)
HEMOCCULT SP1 STL QL: POSITIVE
HGB BLD-MCNC: 11.3 G/DL (ref 11.9–15.1)
INR PPP: 1.1
KETONES UR STRIP.AUTO-MCNC: ABNORMAL MG/DL
KETONES UR STRIP.AUTO-MCNC: NEGATIVE MG/DL
LACTIC ACID, WHOLE BLOOD: 1 MMOL/L (ref 0.7–2.1)
LEUKOCYTE ESTERASE UR QL STRIP.AUTO: ABNORMAL
LEUKOCYTE ESTERASE UR QL STRIP.AUTO: NEGATIVE
MAGNESIUM SERPL-MCNC: 1.7 MG/DL (ref 1.6–2.6)
NEGATIVE BASE EXCESS, VEN: 4 (ref 0–2)
NITRITE UR QL STRIP.AUTO: NEGATIVE
NITRITE UR QL STRIP.AUTO: POSITIVE
O2 SAT, VEN: 54 % (ref 60–85)
PCO2, VEN: 78 MM HG (ref 41–51)
PH VENOUS: 7.15 (ref 7.32–7.43)
PO2, VEN: 38.1 MM HG (ref 30–50)
POC BUN: 6 MG/DL (ref 8–26)
POC CHLORIDE: 101 MMOL/L (ref 98–107)
POC CREATININE: 0.59 MG/DL (ref 0.51–1.19)
POC HEMATOCRIT: 48 % (ref 36–46)
POC HEMOGLOBIN: 16.4 G/DL (ref 12–16)
POC IONIZED CALCIUM: 1.16 MMOL/L (ref 1.15–1.33)
POC LACTIC ACID: 8.6 MMOL/L (ref 0.56–1.39)
POC POTASSIUM: 3.3 MMOL/L (ref 3.5–4.5)
POC SODIUM: 143 MMOL/L (ref 138–146)
POC TCO2: 27 MMOL/L (ref 22–30)
POTASSIUM SERPL-SCNC: 2.8 MMOL/L (ref 3.7–5.3)
POTASSIUM SERPL-SCNC: 3.5 MMOL/L (ref 3.7–5.3)
POTASSIUM SERPL-SCNC: 3.7 MMOL/L (ref 3.7–5.3)
PROT SERPL-MCNC: 5.4 G/DL (ref 6.4–8.3)
PROT UR STRIP.AUTO-MCNC: 5.5 MG/DL (ref 5–8)
PROT UR STRIP.AUTO-MCNC: 8 MG/DL (ref 5–8)
PROT UR STRIP.AUTO-MCNC: NEGATIVE MG/DL
PROT UR STRIP.AUTO-MCNC: NEGATIVE MG/DL
PROTHROMBIN TIME: 11.5 SEC (ref 9.1–12.3)
RBC CLUMPS #/AREA URNS AUTO: NORMAL /HPF (ref 0–4)
REASON FOR REJECTION: NORMAL
REASON FOR REJECTION: NORMAL
SODIUM SERPL-SCNC: 136 MMOL/L (ref 135–144)
SODIUM SERPL-SCNC: 141 MMOL/L (ref 135–144)
SPECIFIC GRAVITY UA: 1.01 (ref 1–1.03)
SPECIFIC GRAVITY UA: 1.03 (ref 1–1.03)
TIME, STOOL #1: ABNORMAL
TROPONIN I SERPL DL<=0.01 NG/ML-MCNC: 295 NG/L (ref 0–14)
TROPONIN I SERPL DL<=0.01 NG/ML-MCNC: 40 NG/L (ref 0–14)
TSH SERPL-ACNC: 1.47 UIU/ML (ref 0.3–5)
TURBIDITY: CLEAR
TURBIDITY: CLEAR
URINE HGB: ABNORMAL
URINE HGB: NEGATIVE
UROBILINOGEN, URINE: NORMAL
UROBILINOGEN, URINE: NORMAL
WBC UA: NORMAL /HPF (ref 0–5)
ZZ NTE CLEAN UP: ORDERED TEST: NORMAL
ZZ NTE CLEAN UP: ORDERED TEST: NORMAL
ZZ NTE WITH NAME CLEAN UP: SPECIMEN SOURCE: NORMAL
ZZ NTE WITH NAME CLEAN UP: SPECIMEN SOURCE: NORMAL

## 2023-02-20 PROCEDURE — 6370000000 HC RX 637 (ALT 250 FOR IP): Performed by: INTERNAL MEDICINE

## 2023-02-20 PROCEDURE — 36415 COLL VENOUS BLD VENIPUNCTURE: CPT

## 2023-02-20 PROCEDURE — 71045 X-RAY EXAM CHEST 1 VIEW: CPT

## 2023-02-20 PROCEDURE — 83735 ASSAY OF MAGNESIUM: CPT

## 2023-02-20 PROCEDURE — 6360000002 HC RX W HCPCS: Performed by: INTERNAL MEDICINE

## 2023-02-20 PROCEDURE — 2580000003 HC RX 258: Performed by: INTERNAL MEDICINE

## 2023-02-20 PROCEDURE — 84484 ASSAY OF TROPONIN QUANT: CPT

## 2023-02-20 PROCEDURE — 80051 ELECTROLYTE PANEL: CPT

## 2023-02-20 PROCEDURE — 81001 URINALYSIS AUTO W/SCOPE: CPT

## 2023-02-20 PROCEDURE — 6370000000 HC RX 637 (ALT 250 FOR IP): Performed by: STUDENT IN AN ORGANIZED HEALTH CARE EDUCATION/TRAINING PROGRAM

## 2023-02-20 PROCEDURE — 2500000003 HC RX 250 WO HCPCS

## 2023-02-20 PROCEDURE — 84132 ASSAY OF SERUM POTASSIUM: CPT

## 2023-02-20 PROCEDURE — 2500000003 HC RX 250 WO HCPCS: Performed by: INTERNAL MEDICINE

## 2023-02-20 PROCEDURE — 81003 URINALYSIS AUTO W/O SCOPE: CPT

## 2023-02-20 PROCEDURE — 85018 HEMOGLOBIN: CPT

## 2023-02-20 PROCEDURE — 99223 1ST HOSP IP/OBS HIGH 75: CPT | Performed by: INTERNAL MEDICINE

## 2023-02-20 PROCEDURE — 2060000000 HC ICU INTERMEDIATE R&B

## 2023-02-20 PROCEDURE — 99222 1ST HOSP IP/OBS MODERATE 55: CPT | Performed by: NURSE PRACTITIONER

## 2023-02-20 PROCEDURE — 94660 CPAP INITIATION&MGMT: CPT

## 2023-02-20 PROCEDURE — 82272 OCCULT BLD FECES 1-3 TESTS: CPT

## 2023-02-20 PROCEDURE — 82947 ASSAY GLUCOSE BLOOD QUANT: CPT

## 2023-02-20 PROCEDURE — 85610 PROTHROMBIN TIME: CPT

## 2023-02-20 PROCEDURE — 83605 ASSAY OF LACTIC ACID: CPT

## 2023-02-20 PROCEDURE — 6360000002 HC RX W HCPCS: Performed by: STUDENT IN AN ORGANIZED HEALTH CARE EDUCATION/TRAINING PROGRAM

## 2023-02-20 PROCEDURE — 2580000003 HC RX 258: Performed by: NURSE PRACTITIONER

## 2023-02-20 PROCEDURE — 80053 COMPREHEN METABOLIC PANEL: CPT

## 2023-02-20 PROCEDURE — 99254 IP/OBS CNSLTJ NEW/EST MOD 60: CPT | Performed by: PSYCHIATRY & NEUROLOGY

## 2023-02-20 PROCEDURE — 99255 IP/OBS CONSLTJ NEW/EST HI 80: CPT | Performed by: INTERNAL MEDICINE

## 2023-02-20 PROCEDURE — 85014 HEMATOCRIT: CPT

## 2023-02-20 RX ORDER — POTASSIUM CHLORIDE 20 MEQ/1
40 TABLET, EXTENDED RELEASE ORAL PRN
Status: DISCONTINUED | OUTPATIENT
Start: 2023-02-20 | End: 2023-03-06 | Stop reason: HOSPADM

## 2023-02-20 RX ORDER — MAGNESIUM SULFATE 1 G/100ML
1000 INJECTION INTRAVENOUS PRN
Status: DISCONTINUED | OUTPATIENT
Start: 2023-02-20 | End: 2023-03-06 | Stop reason: HOSPADM

## 2023-02-20 RX ORDER — INSULIN LISPRO 100 [IU]/ML
0-4 INJECTION, SOLUTION INTRAVENOUS; SUBCUTANEOUS EVERY 4 HOURS
Status: DISCONTINUED | OUTPATIENT
Start: 2023-02-20 | End: 2023-02-23

## 2023-02-20 RX ORDER — MORPHINE SULFATE 2 MG/ML
2 INJECTION, SOLUTION INTRAMUSCULAR; INTRAVENOUS
Status: DISCONTINUED | OUTPATIENT
Start: 2023-02-20 | End: 2023-02-21

## 2023-02-20 RX ORDER — SODIUM CHLORIDE 9 MG/ML
INJECTION, SOLUTION INTRAVENOUS CONTINUOUS
Status: DISCONTINUED | OUTPATIENT
Start: 2023-02-20 | End: 2023-02-20

## 2023-02-20 RX ORDER — LEVETIRACETAM 10 MG/ML
2000 INJECTION INTRAVASCULAR ONCE
Status: COMPLETED | OUTPATIENT
Start: 2023-02-20 | End: 2023-02-20

## 2023-02-20 RX ORDER — SODIUM CHLORIDE 0.9 % (FLUSH) 0.9 %
5-40 SYRINGE (ML) INJECTION PRN
Status: DISCONTINUED | OUTPATIENT
Start: 2023-02-20 | End: 2023-03-06 | Stop reason: HOSPADM

## 2023-02-20 RX ORDER — OXYCODONE HYDROCHLORIDE AND ACETAMINOPHEN 5; 325 MG/1; MG/1
1 TABLET ORAL EVERY 4 HOURS PRN
Status: DISCONTINUED | OUTPATIENT
Start: 2023-02-20 | End: 2023-03-06 | Stop reason: HOSPADM

## 2023-02-20 RX ORDER — ACETAMINOPHEN 325 MG/1
650 TABLET ORAL EVERY 6 HOURS PRN
Status: DISCONTINUED | OUTPATIENT
Start: 2023-02-20 | End: 2023-03-06 | Stop reason: HOSPADM

## 2023-02-20 RX ORDER — POTASSIUM CHLORIDE 20 MEQ/1
40 TABLET, EXTENDED RELEASE ORAL ONCE
Status: DISCONTINUED | OUTPATIENT
Start: 2023-02-20 | End: 2023-02-24

## 2023-02-20 RX ORDER — SODIUM CHLORIDE 9 MG/ML
INJECTION, SOLUTION INTRAVENOUS PRN
Status: DISCONTINUED | OUTPATIENT
Start: 2023-02-20 | End: 2023-03-06 | Stop reason: HOSPADM

## 2023-02-20 RX ORDER — SODIUM CHLORIDE 0.9 % (FLUSH) 0.9 %
5-40 SYRINGE (ML) INJECTION EVERY 12 HOURS SCHEDULED
Status: DISCONTINUED | OUTPATIENT
Start: 2023-02-20 | End: 2023-03-06 | Stop reason: HOSPADM

## 2023-02-20 RX ORDER — ONDANSETRON 4 MG/1
4 TABLET, ORALLY DISINTEGRATING ORAL EVERY 8 HOURS PRN
Status: DISCONTINUED | OUTPATIENT
Start: 2023-02-20 | End: 2023-03-06 | Stop reason: HOSPADM

## 2023-02-20 RX ORDER — POLYETHYLENE GLYCOL 3350 17 G/17G
17 POWDER, FOR SOLUTION ORAL DAILY PRN
Status: DISCONTINUED | OUTPATIENT
Start: 2023-02-20 | End: 2023-02-20

## 2023-02-20 RX ORDER — LORAZEPAM 2 MG/ML
1 INJECTION INTRAMUSCULAR EVERY 5 MIN PRN
Status: DISCONTINUED | OUTPATIENT
Start: 2023-02-20 | End: 2023-02-25

## 2023-02-20 RX ORDER — POTASSIUM CHLORIDE 7.45 MG/ML
10 INJECTION INTRAVENOUS PRN
Status: DISCONTINUED | OUTPATIENT
Start: 2023-02-20 | End: 2023-03-06 | Stop reason: HOSPADM

## 2023-02-20 RX ORDER — CIPROFLOXACIN 2 MG/ML
400 INJECTION, SOLUTION INTRAVENOUS EVERY 12 HOURS
Status: COMPLETED | OUTPATIENT
Start: 2023-02-20 | End: 2023-02-25

## 2023-02-20 RX ORDER — POLYETHYLENE GLYCOL 3350 17 G/17G
17 POWDER, FOR SOLUTION ORAL DAILY
Status: DISCONTINUED | OUTPATIENT
Start: 2023-02-20 | End: 2023-02-21

## 2023-02-20 RX ORDER — LEVETIRACETAM 500 MG/1
1000 TABLET ORAL 2 TIMES DAILY
Status: DISCONTINUED | OUTPATIENT
Start: 2023-02-20 | End: 2023-03-06 | Stop reason: HOSPADM

## 2023-02-20 RX ORDER — LEVETIRACETAM 500 MG/1
1000 TABLET ORAL 2 TIMES DAILY
Status: DISCONTINUED | OUTPATIENT
Start: 2023-02-20 | End: 2023-02-20

## 2023-02-20 RX ORDER — ONDANSETRON 2 MG/ML
4 INJECTION INTRAMUSCULAR; INTRAVENOUS EVERY 6 HOURS PRN
Status: DISCONTINUED | OUTPATIENT
Start: 2023-02-20 | End: 2023-03-06 | Stop reason: HOSPADM

## 2023-02-20 RX ORDER — CALCIUM GLUCONATE 20 MG/ML
1000 INJECTION, SOLUTION INTRAVENOUS ONCE
Status: COMPLETED | OUTPATIENT
Start: 2023-02-20 | End: 2023-02-20

## 2023-02-20 RX ORDER — ENOXAPARIN SODIUM 100 MG/ML
40 INJECTION SUBCUTANEOUS DAILY
Status: DISCONTINUED | OUTPATIENT
Start: 2023-02-20 | End: 2023-03-06 | Stop reason: HOSPADM

## 2023-02-20 RX ORDER — ACETAMINOPHEN 650 MG/1
650 SUPPOSITORY RECTAL EVERY 6 HOURS PRN
Status: DISCONTINUED | OUTPATIENT
Start: 2023-02-20 | End: 2023-03-06 | Stop reason: HOSPADM

## 2023-02-20 RX ORDER — DEXTROSE MONOHYDRATE 100 MG/ML
INJECTION, SOLUTION INTRAVENOUS CONTINUOUS PRN
Status: DISCONTINUED | OUTPATIENT
Start: 2023-02-20 | End: 2023-03-06 | Stop reason: HOSPADM

## 2023-02-20 RX ORDER — DOCUSATE SODIUM 100 MG/1
100 CAPSULE, LIQUID FILLED ORAL 2 TIMES DAILY
Status: DISCONTINUED | OUTPATIENT
Start: 2023-02-20 | End: 2023-02-21

## 2023-02-20 RX ORDER — MORPHINE SULFATE 4 MG/ML
4 INJECTION, SOLUTION INTRAMUSCULAR; INTRAVENOUS
Status: DISCONTINUED | OUTPATIENT
Start: 2023-02-20 | End: 2023-02-21

## 2023-02-20 RX ORDER — DEXTROSE, SODIUM CHLORIDE, AND POTASSIUM CHLORIDE 5; .9; .15 G/100ML; G/100ML; G/100ML
INJECTION INTRAVENOUS CONTINUOUS
Status: DISCONTINUED | OUTPATIENT
Start: 2023-02-20 | End: 2023-02-21

## 2023-02-20 RX ORDER — MORPHINE SULFATE 15 MG/1
15 TABLET, FILM COATED, EXTENDED RELEASE ORAL 2 TIMES DAILY
Status: DISCONTINUED | OUTPATIENT
Start: 2023-02-20 | End: 2023-03-06 | Stop reason: HOSPADM

## 2023-02-20 RX ADMIN — POTASSIUM CHLORIDE 10 MEQ: 7.46 INJECTION, SOLUTION INTRAVENOUS at 14:53

## 2023-02-20 RX ADMIN — POTASSIUM CHLORIDE, DEXTROSE MONOHYDRATE AND SODIUM CHLORIDE: 150; 5; 900 INJECTION, SOLUTION INTRAVENOUS at 13:22

## 2023-02-20 RX ADMIN — MORPHINE SULFATE 15 MG: 15 TABLET, FILM COATED, EXTENDED RELEASE ORAL at 20:29

## 2023-02-20 RX ADMIN — ENOXAPARIN SODIUM 40 MG: 100 INJECTION SUBCUTANEOUS at 10:15

## 2023-02-20 RX ADMIN — SODIUM CHLORIDE, PRESERVATIVE FREE 10 ML: 5 INJECTION INTRAVENOUS at 20:30

## 2023-02-20 RX ADMIN — POTASSIUM CHLORIDE 10 MEQ: 7.46 INJECTION, SOLUTION INTRAVENOUS at 13:24

## 2023-02-20 RX ADMIN — HYDROXOCOBALAMIN 5 G: 5 INJECTION, POWDER, LYOPHILIZED, FOR SOLUTION INTRAVENOUS at 01:01

## 2023-02-20 RX ADMIN — LEVETIRACETAM 1000 MG: 10 INJECTION INTRAVENOUS at 02:27

## 2023-02-20 RX ADMIN — POTASSIUM CHLORIDE 10 MEQ: 7.46 INJECTION, SOLUTION INTRAVENOUS at 17:44

## 2023-02-20 RX ADMIN — DOCUSATE SODIUM 100 MG: 100 CAPSULE ORAL at 20:30

## 2023-02-20 RX ADMIN — POTASSIUM CHLORIDE 10 MEQ: 7.46 INJECTION, SOLUTION INTRAVENOUS at 16:20

## 2023-02-20 RX ADMIN — SODIUM CHLORIDE: 9 INJECTION, SOLUTION INTRAVENOUS at 01:26

## 2023-02-20 RX ADMIN — LEVETIRACETAM 1000 MG: 500 TABLET, FILM COATED ORAL at 20:29

## 2023-02-20 RX ADMIN — CIPROFLOXACIN 400 MG: 2 INJECTION, SOLUTION INTRAVENOUS at 18:30

## 2023-02-20 RX ADMIN — POTASSIUM CHLORIDE 10 MEQ: 7.46 INJECTION, SOLUTION INTRAVENOUS at 12:18

## 2023-02-20 RX ADMIN — LACOSAMIDE 150 MG: 100 TABLET, FILM COATED ORAL at 20:29

## 2023-02-20 RX ADMIN — MORPHINE SULFATE 4 MG: 4 INJECTION INTRAVENOUS at 17:04

## 2023-02-20 RX ADMIN — CALCIUM GLUCONATE 1000 MG: 20 INJECTION, SOLUTION INTRAVENOUS at 01:27

## 2023-02-20 RX ADMIN — LAMOTRIGINE 125 MG: 100 TABLET ORAL at 20:29

## 2023-02-20 RX ADMIN — SODIUM CHLORIDE, PRESERVATIVE FREE 10 ML: 5 INJECTION INTRAVENOUS at 10:16

## 2023-02-20 RX ADMIN — POTASSIUM CHLORIDE 10 MEQ: 7.46 INJECTION, SOLUTION INTRAVENOUS at 11:14

## 2023-02-20 ASSESSMENT — PAIN SCALES - GENERAL: PAINLEVEL_OUTOF10: 0
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
## 2023-02-20 NOTE — ED NOTES
Pt is resting on stretcher. No distress noted. Call bell within reach. Pt is on the monitor.      JAYA Russell  02/19/23 [id number]

## 2023-02-20 NOTE — ED NOTES
Pt is resting on stretcher. She is disoriented. Pt is not answering questions properly. Pt passed stool. Brief and bed changed. Call bell within reach.       Drew, [address] [facility]  96/09/69 [id number]

## 2023-02-20 NOTE — ED NOTES
Troponin elevated than the previous ones.  Notified to the admitting team.      [facility], RN  02/20/23 4799

## 2023-02-20 NOTE — ED NOTES
VS stable. Pt is resting on stretcher. Doesn't answer questions. Call bell within reach. NAD noted.      Drew, [address] [facility]  02/20/23 4889

## 2023-02-20 NOTE — CARE COORDINATION
Case Management Assessment  Initial Evaluation    Date/Time of Evaluation: 2/20/2023 3:30PM  Assessment Completed by: Dar Jules RN    If patient is discharged prior to next notation, then this note serves as note for discharge by case management. [facility]                   YOB: 1965  Diagnosis: Seizure ([address] [R56.9]  Altered mental status, unspecified altered mental status type [R41.82]                   Date / Time: 2/19/2023  5:59 PM    Patient Admission Status: Inpatient   Readmission Risk (Low < 19, Mod (19-27), High > 27): Readmission Risk Score: 22    Current PCP: Azeem Toney, DO  PCP verified by CM? Yes (Azeem Toney D. OFreya)    Chart Reviewed: Yes      History Provided by: Child/Family  Patient Orientation:  (confused)    Patient Cognition:  (awake, confused)    Hospitalization in the last 30 days (Readmission):  No    If yes, Readmission Assessment in CM Navigator will be completed. Advance Directives:      Code Status: Full Code   Patient's Primary Decision Maker is: Legal Next of Kin    Primary Decision Maker: James Da Silva [facility] - Child - [phone]    Secondary Decision Maker: Bang Chew - Child - [phone]    Discharge Planning:    Patient lives with: Spouse/Significant Other Type of Home: House  Primary Care Giver: Family  Patient Support Systems include: Children, Spouse/Significant Other   Current Financial resources:    Current community resources: None  Current services prior to admission: Durable Medical Equipment            Current DME:              Type of Home Care services:  None    ADLS  Prior functional level: Assistance with the following:, Bathing, Dressing, Toileting, Mobility, Housework, Cooking  Current functional level: Assistance with the following:, Bathing, Dressing, Toileting, Cooking, Housework, Mobility    PT AM-PAC:   /24  OT AM-PAC:   /24    Family can provide assistance at DC:     Would you like Case Management to discuss the discharge plan with any other family members/significant others, and if so, who? Plans to Return to Present Housing: No  Other Identified Issues/Barriers to RETURNING to current housing: needs SNF or possible Hospice-per palliative nurse  Potential Assistance needed at discharge: [place]            Potential DME:    Patient expects to discharge to: [facility] for transportation at discharge: Family    Financial    Payor: Marcellus Cazares [phone] [place] / Plan: [employer] / Product Type: *No Product type* /     Does insurance require precert for SNF: Yes    Potential assistance Purchasing Medications: No  Meds-to-Beds request: Yes      [address], [address] [facility] [phone]  [address] [facility] [address]  Phone: [phone] Fax: [phone]      Notes:    Factors facilitating achievement of predicted outcomes: Family support    Barriers to discharge: SNF or Possible Hospice    Additional Case Management Notes: Transitional planning-patient awake, confused. Talked with daughter Vipul Johnson is to go to a SNF. Verified address, emergency contacts, and insurance with daughter. [address] list given to daughter  63 per palliative nurse-family may want hospice-will let this RN know their decision. The Plan for Transition of Care is related to the following treatment goals of Seizure ([facility] [R56.9]  Altered mental status, unspecified altered mental status type [W32.40]    IF APPLICABLE: The Patient and/or patient representative BODØ and her family were provided with a choice of provider and agrees with the discharge plan. Freedom of choice list with basic dialogue that supports the patient's individualized plan of care/goals and shares the quality data associated with the providers was provided to: Patient   Patient Representative Name:       The Patient and/or Patient Representative Agree with the Discharge Plan?  Yes    Jalil Garcia RN  Case Management Department  Ph: [phone] Fax: [phone]
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
## 2023-02-20 NOTE — ACP (ADVANCE CARE PLANNING)
Advance Care Planning     Advance Care Planning (ACP) Physician/NP/PA Conversation    Date of Conversation: 2/19/2023  Conducted with:  Healthcare Decision Maker: Named in Advance Directive or Healthcare Power of Armida (name) Daughter Franchesca Day is primary agent    Healthcare Decision Maker:      Primary Decision Maker: Davey Falk [facility] - Child - [phone]    Secondary Decision Maker: Bell Bee - Child - [phone]    Click here to complete Healthcare Decision Makers including selection of the Healthcare Decision Maker Relationship (ie \"Primary\")  Today we documented Decision Maker(s) consistent with ACP documents on file. Care Preferences:    Hospitalization: \"If your health worsens and it becomes clear that your chance of recovery is unlikely, what would be your preference regarding hospitalization? \"  The patient would prefer hospitalization. Ventilation: \"If you were unable to breath on your own and your chance of recovery was unlikely, what would be your preference about the use of a ventilator (breathing machine) if it was available to you? \"  Patient is a full code    Resuscitation: \"In the event your heart stopped as a result of an underlying serious health condition, would you want attempts made to restart your heart, or would you prefer a natural death? \"  Patient is a full code    benefit/burden of treatment options, ventilation preferences, hospitalization preferences, resuscitation preferences, and hospice care    Conversation Outcomes / Follow-Up Plan:  ACP in process - information provided, considering goals and options  Reviewed DNR/DNI and patient elects Full Code (Attempt Resuscitation)    Length of Voluntary ACP Conversation in minutes:  16 minutes    Richardson Lopez, ESTRELLITA - CNP
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
## 2023-02-20 NOTE — CONSULTS
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 assist; intake normal or reduced; LOC full/confusion   _x__30%  Bed Bound; Extensive disease; Total care; intake reduced; LOCfull/confusion  ___20%  Bed Bound; Extensive disease; Total care; intake minimal; Drowsy/coma  ___10%  Bed Bound; Extensive disease; Total care; Mouth care only; Drowsy/coma  ___0       Death        Thank you for allowing Palliative Care to participate in the care of Ms. Garcia . This note has been dictated by dragon, typing errors may be a possibility. The total time I spent in seeing the patient, discussing goals of care, advanced directives, code status and other major issues was more than 60 minutes      Electronically signed by   ESTRELLITA Flores - CNP  Palliative Care Team  on 2/20/2023 at 8:06 AM    Palliative Care can be reached via perfect serve.

## 2023-02-20 NOTE — ED NOTES
Pt is on stretcher. Pt is on the full monitor. NAD noted. Family at bedside. Call bell within reach. Continue with plan of care.      JAYA Russell  02/20/23 5724

## 2023-02-20 NOTE — ED NOTES
Pt is sleeping in side-lying position. Motor response to touching. NAD noted. No distress noted. She doesn't answer the question, but nod her head. Call bell within reach.      Drew, [facility]  02/20/23 0996
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
## 2023-02-20 NOTE — ED NOTES
Pt urinated with color of wine (red-colored). One of the adverse effects of Cyanokit is chromaturia.        [facility]  [birth year] 0565
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
## 2023-02-21 ENCOUNTER — HOSPITAL ENCOUNTER (OUTPATIENT)
Facility: MEDICAL CENTER | Age: 58
End: 2023-02-21

## 2023-02-21 ENCOUNTER — TELEPHONE (OUTPATIENT)
Dept: INFUSION THERAPY | Facility: MEDICAL CENTER | Age: 58
End: 2023-02-21

## 2023-02-21 ENCOUNTER — APPOINTMENT (OUTPATIENT)
Dept: MRI IMAGING | Age: 58
DRG: 056 | End: 2023-02-21
Payer: COMMERCIAL

## 2023-02-21 LAB
ABSOLUTE EOS #: 0.06 K/UL (ref 0–0.44)
ABSOLUTE IMMATURE GRANULOCYTE: 0.05 K/UL (ref 0–0.3)
ABSOLUTE LYMPH #: 0.87 K/UL (ref 1.1–3.7)
ABSOLUTE MONO #: 1.12 K/UL (ref 0.1–1.2)
ANION GAP SERPL CALCULATED.3IONS-SCNC: 10 MMOL/L (ref 9–17)
BASOPHILS # BLD: 1 % (ref 0–2)
BASOPHILS ABSOLUTE: 0.08 K/UL (ref 0–0.2)
BUN SERPL-MCNC: 6 MG/DL (ref 6–20)
CA-I BLD-SCNC: 1.09 MMOL/L (ref 1.13–1.33)
CALCIUM SERPL-MCNC: 7.7 MG/DL (ref 8.6–10.4)
CHLORIDE SERPL-SCNC: 104 MMOL/L (ref 98–107)
CO2 SERPL-SCNC: 22 MMOL/L (ref 20–31)
CREAT SERPL-MCNC: 0.28 MG/DL (ref 0.5–0.9)
EOSINOPHILS RELATIVE PERCENT: 1 % (ref 1–4)
GFR SERPL CREATININE-BSD FRML MDRD: >60 ML/MIN/1.73M2
GLUCOSE BLD-MCNC: 102 MG/DL (ref 65–105)
GLUCOSE BLD-MCNC: 109 MG/DL (ref 65–105)
GLUCOSE BLD-MCNC: 115 MG/DL (ref 65–105)
GLUCOSE BLD-MCNC: 115 MG/DL (ref 65–105)
GLUCOSE BLD-MCNC: 118 MG/DL (ref 65–105)
GLUCOSE BLD-MCNC: 152 MG/DL (ref 65–105)
GLUCOSE SERPL-MCNC: 113 MG/DL (ref 70–99)
HCT VFR BLD AUTO: 39.9 % (ref 36.3–47.1)
HGB BLD-MCNC: 11.8 G/DL (ref 11.9–15.1)
IMMATURE GRANULOCYTES: 1 %
LYMPHOCYTES # BLD: 9 % (ref 24–43)
MCH RBC QN AUTO: 30.4 PG (ref 25.2–33.5)
MCHC RBC AUTO-ENTMCNC: 29.6 G/DL (ref 28.4–34.8)
MCV RBC AUTO: 102.8 FL (ref 82.6–102.9)
MONOCYTES # BLD: 12 % (ref 3–12)
MYOGLOBIN SERPL-MCNC: 117 NG/ML (ref 25–58)
MYOGLOBIN SERPL-MCNC: 184 NG/ML (ref 25–58)
NRBC AUTOMATED: 0 PER 100 WBC
PDW BLD-RTO: 19.3 % (ref 11.8–14.4)
PLATELET # BLD AUTO: 349 K/UL (ref 138–453)
PMV BLD AUTO: 8.6 FL (ref 8.1–13.5)
POTASSIUM SERPL-SCNC: 3.6 MMOL/L (ref 3.7–5.3)
RBC # BLD: 3.88 M/UL (ref 3.95–5.11)
RBC # BLD: ABNORMAL 10*6/UL
REASON FOR REJECTION: NORMAL
SEG NEUTROPHILS: 77 % (ref 36–65)
SEGMENTED NEUTROPHILS ABSOLUTE COUNT: 7.4 K/UL (ref 1.5–8.1)
SODIUM SERPL-SCNC: 136 MMOL/L (ref 135–144)
TROPONIN I SERPL DL<=0.01 NG/ML-MCNC: 100 NG/L (ref 0–14)
WBC # BLD AUTO: 9.6 K/UL (ref 3.5–11.3)
ZZ NTE CLEAN UP: ORDERED TEST: NORMAL
ZZ NTE WITH NAME CLEAN UP: SPECIMEN SOURCE: NORMAL

## 2023-02-21 PROCEDURE — 6370000000 HC RX 637 (ALT 250 FOR IP): Performed by: INTERNAL MEDICINE

## 2023-02-21 PROCEDURE — 6360000002 HC RX W HCPCS: Performed by: INTERNAL MEDICINE

## 2023-02-21 PROCEDURE — 85025 COMPLETE CBC W/AUTO DIFF WBC: CPT

## 2023-02-21 PROCEDURE — 2580000003 HC RX 258: Performed by: INTERNAL MEDICINE

## 2023-02-21 PROCEDURE — 97530 THERAPEUTIC ACTIVITIES: CPT

## 2023-02-21 PROCEDURE — 84484 ASSAY OF TROPONIN QUANT: CPT

## 2023-02-21 PROCEDURE — 99232 SBSQ HOSP IP/OBS MODERATE 35: CPT | Performed by: PSYCHIATRY & NEUROLOGY

## 2023-02-21 PROCEDURE — 80048 BASIC METABOLIC PNL TOTAL CA: CPT

## 2023-02-21 PROCEDURE — 2500000003 HC RX 250 WO HCPCS: Performed by: INTERNAL MEDICINE

## 2023-02-21 PROCEDURE — 2500000003 HC RX 250 WO HCPCS: Performed by: FAMILY MEDICINE

## 2023-02-21 PROCEDURE — 82947 ASSAY GLUCOSE BLOOD QUANT: CPT

## 2023-02-21 PROCEDURE — 36415 COLL VENOUS BLD VENIPUNCTURE: CPT

## 2023-02-21 PROCEDURE — 97166 OT EVAL MOD COMPLEX 45 MIN: CPT

## 2023-02-21 PROCEDURE — 99232 SBSQ HOSP IP/OBS MODERATE 35: CPT | Performed by: FAMILY MEDICINE

## 2023-02-21 PROCEDURE — 99232 SBSQ HOSP IP/OBS MODERATE 35: CPT | Performed by: INTERNAL MEDICINE

## 2023-02-21 PROCEDURE — 95819 EEG AWAKE AND ASLEEP: CPT | Performed by: PSYCHIATRY & NEUROLOGY

## 2023-02-21 PROCEDURE — 97535 SELF CARE MNGMENT TRAINING: CPT

## 2023-02-21 PROCEDURE — 95819 EEG AWAKE AND ASLEEP: CPT

## 2023-02-21 PROCEDURE — 94660 CPAP INITIATION&MGMT: CPT

## 2023-02-21 PROCEDURE — 2060000000 HC ICU INTERMEDIATE R&B

## 2023-02-21 PROCEDURE — 82330 ASSAY OF CALCIUM: CPT

## 2023-02-21 PROCEDURE — 83874 ASSAY OF MYOGLOBIN: CPT

## 2023-02-21 PROCEDURE — 97162 PT EVAL MOD COMPLEX 30 MIN: CPT

## 2023-02-21 PROCEDURE — 6370000000 HC RX 637 (ALT 250 FOR IP): Performed by: CLINICAL NURSE SPECIALIST

## 2023-02-21 RX ORDER — POLYETHYLENE GLYCOL 3350 17 G/17G
17 POWDER, FOR SOLUTION ORAL DAILY PRN
Status: DISCONTINUED | OUTPATIENT
Start: 2023-02-21 | End: 2023-02-23

## 2023-02-21 RX ORDER — LOPERAMIDE HYDROCHLORIDE 2 MG/1
2 CAPSULE ORAL 4 TIMES DAILY PRN
Status: DISCONTINUED | OUTPATIENT
Start: 2023-02-21 | End: 2023-02-23

## 2023-02-21 RX ORDER — DOCUSATE SODIUM 100 MG/1
100 CAPSULE, LIQUID FILLED ORAL 2 TIMES DAILY PRN
Status: DISCONTINUED | OUTPATIENT
Start: 2023-02-21 | End: 2023-02-23

## 2023-02-21 RX ORDER — LORAZEPAM 2 MG/ML
0.5 INJECTION INTRAMUSCULAR ONCE
Status: DISCONTINUED | OUTPATIENT
Start: 2023-02-21 | End: 2023-02-23

## 2023-02-21 RX ORDER — DEXTROSE, SODIUM CHLORIDE, AND POTASSIUM CHLORIDE 5; .9; .15 G/100ML; G/100ML; G/100ML
INJECTION INTRAVENOUS CONTINUOUS
Status: ACTIVE | OUTPATIENT
Start: 2023-02-21 | End: 2023-02-21

## 2023-02-21 RX ORDER — CALCIUM CARBONATE 200(500)MG
500 TABLET,CHEWABLE ORAL 3 TIMES DAILY PRN
Status: DISCONTINUED | OUTPATIENT
Start: 2023-02-21 | End: 2023-03-06 | Stop reason: HOSPADM

## 2023-02-21 RX ORDER — PANTOPRAZOLE SODIUM 40 MG/1
40 TABLET, DELAYED RELEASE ORAL
Status: DISCONTINUED | OUTPATIENT
Start: 2023-02-22 | End: 2023-02-25

## 2023-02-21 RX ADMIN — LEVETIRACETAM 1000 MG: 500 TABLET, FILM COATED ORAL at 09:59

## 2023-02-21 RX ADMIN — ENOXAPARIN SODIUM 40 MG: 100 INJECTION SUBCUTANEOUS at 09:56

## 2023-02-21 RX ADMIN — SODIUM CHLORIDE 25 ML: 9 INJECTION, SOLUTION INTRAVENOUS at 18:07

## 2023-02-21 RX ADMIN — ACETAMINOPHEN 650 MG: 325 TABLET ORAL at 15:46

## 2023-02-21 RX ADMIN — SODIUM CHLORIDE, PRESERVATIVE FREE 10 ML: 5 INJECTION INTRAVENOUS at 20:03

## 2023-02-21 RX ADMIN — LOPERAMIDE HYDROCHLORIDE 2 MG: 2 CAPSULE ORAL at 20:35

## 2023-02-21 RX ADMIN — ONDANSETRON 4 MG: 2 INJECTION INTRAMUSCULAR; INTRAVENOUS at 04:14

## 2023-02-21 RX ADMIN — CIPROFLOXACIN 400 MG: 2 INJECTION, SOLUTION INTRAVENOUS at 18:07

## 2023-02-21 RX ADMIN — CIPROFLOXACIN 400 MG: 2 INJECTION, SOLUTION INTRAVENOUS at 04:19

## 2023-02-21 RX ADMIN — LAMOTRIGINE 125 MG: 100 TABLET ORAL at 20:03

## 2023-02-21 RX ADMIN — POTASSIUM CHLORIDE, DEXTROSE MONOHYDRATE AND SODIUM CHLORIDE: 150; 5; 900 INJECTION, SOLUTION INTRAVENOUS at 02:38

## 2023-02-21 RX ADMIN — SODIUM CHLORIDE, PRESERVATIVE FREE 10 ML: 5 INJECTION INTRAVENOUS at 09:59

## 2023-02-21 RX ADMIN — POTASSIUM CHLORIDE 40 MEQ: 1500 TABLET, EXTENDED RELEASE ORAL at 01:39

## 2023-02-21 RX ADMIN — LACOSAMIDE 150 MG: 100 TABLET, FILM COATED ORAL at 09:58

## 2023-02-21 RX ADMIN — MORPHINE SULFATE 15 MG: 15 TABLET, FILM COATED, EXTENDED RELEASE ORAL at 20:02

## 2023-02-21 RX ADMIN — LEVETIRACETAM 1000 MG: 500 TABLET, FILM COATED ORAL at 20:03

## 2023-02-21 RX ADMIN — LAMOTRIGINE 125 MG: 100 TABLET ORAL at 09:58

## 2023-02-21 RX ADMIN — POTASSIUM CHLORIDE, DEXTROSE MONOHYDRATE AND SODIUM CHLORIDE: 150; 5; 900 INJECTION, SOLUTION INTRAVENOUS at 09:57

## 2023-02-21 RX ADMIN — MORPHINE SULFATE 15 MG: 15 TABLET, FILM COATED, EXTENDED RELEASE ORAL at 09:59

## 2023-02-21 RX ADMIN — OXYCODONE HYDROCHLORIDE AND ACETAMINOPHEN 1 TABLET: 5; 325 TABLET ORAL at 21:44

## 2023-02-21 RX ADMIN — LACOSAMIDE 150 MG: 100 TABLET, FILM COATED ORAL at 20:03

## 2023-02-21 ASSESSMENT — PAIN DESCRIPTION - ORIENTATION
ORIENTATION: LOWER
ORIENTATION: LOWER;MID
ORIENTATION: LOWER;MID
ORIENTATION: LOWER

## 2023-02-21 ASSESSMENT — ENCOUNTER SYMPTOMS
CHEST TIGHTNESS: 0
COUGH: 0
CONSTIPATION: 0
RHINORRHEA: 0
COLOR CHANGE: 0
SHORTNESS OF BREATH: 0
WHEEZING: 0
BLOOD IN STOOL: 0
PHOTOPHOBIA: 0
ABDOMINAL PAIN: 0
VOICE CHANGE: 0
TROUBLE SWALLOWING: 0
NAUSEA: 0
VOMITING: 0
DIARRHEA: 0
SHORTNESS OF BREATH: 1

## 2023-02-21 ASSESSMENT — PAIN SCALES - GENERAL
PAINLEVEL_OUTOF10: 7
PAINLEVEL_OUTOF10: 3
PAINLEVEL_OUTOF10: 8
PAINLEVEL_OUTOF10: 10
PAINLEVEL_OUTOF10: 10

## 2023-02-21 ASSESSMENT — PAIN DESCRIPTION - LOCATION
LOCATION: ABDOMEN

## 2023-02-21 ASSESSMENT — PAIN DESCRIPTION - DESCRIPTORS
DESCRIPTORS: CRAMPING
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
## 2023-02-21 NOTE — ACP (ADVANCE CARE PLANNING)
Advance Care Planning     Advance Care Planning Activator (Inpatient)  Conversation Note      Date of ACP Conversation: 2023     [employer] with:  Healthcare Decision Maker: Named in Advance Directive or Healthcare Power of  (name) Rosalina Cooper. Reviewed Jesse Murguia document. ACP Activator: Don Jewell RN    Health Care Decision Maker:     Current Designated Health Care Decision Maker:    Primary Decision maker per Jesse Murguia  [facility]- [phone]    Document on hand lists Eusebia Medina- son as primary and Suzette Payne as alternate. Unfortunately Be Jackson is  making Suzette Payne decision maker. Today we documented Decision Maker(s) consistent with ACP documents on file.     [facility]  Nakul Frank BSN, [facility]  [address] [place] [address] [phone]
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
## 2023-02-21 NOTE — PLAN OF CARE
Problem: Discharge Planning  Goal: Discharge to home or other facility with appropriate resources  2/20/2023 2333 by Chantelle Ramirez RN  Outcome: Progressing  2/20/2023 1027 by Magdiel Quiñonez RN  Outcome: Progressing     Problem: Pain  Goal: Verbalizes/displays adequate comfort level or baseline comfort level  2/20/2023 2333 by Chantelle Ramirez RN  Outcome: Progressing  2/20/2023 1027 by Magdiel Quiñonez RN  Outcome: Progressing     Problem: Skin/Tissue Integrity  Goal: Absence of new skin breakdown  Description: 1.  Monitor for areas of redness and/or skin breakdown  2.  Assess vascular access sites hourly  3.  Every 4-6 hours minimum:  Change oxygen saturation probe site  4.  Every 4-6 hours:  If on nasal continuous positive airway pressure, respiratory therapy assess nares and determine need for appliance change or resting period.  Outcome: Progressing     Problem: Safety - Adult  Goal: Free from fall injury  Outcome: Progressing
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
## 2023-02-21 NOTE — PROGRESS NOTES
Occupational [address]  Occupational Therapy Not Seen Note    DATE: 2023    NAME: Eliazar Jacobsen  MRN: [id number]   : 1965      Patient not seen this date for Occupational Therapy due to:      Testing: Pt off the floor for EEG      Next Scheduled Treatment: Will check back 2023    Electronically signed by Oliverio Lino OT on 2023 at 11:08 AM

## 2023-02-21 NOTE — PLAN OF CARE
Patient with asymptomatic cholelithiasis. Should patient show signs of biliary colic - RUQ pain associated with nausea/emesis +/- food, she will likely need percutaneous cholecystostomy tube as patient is a very poor surgical candidate. General surgery to sign off. Please call with any questions or concerns.     Josephine Acevedo D.O. PGY-5  Department of Surgery  2/21/2023, 5:32 AM

## 2023-02-21 NOTE — CARE COORDINATION
Transitional planning-message from Rosa(palliative) to fax information to [facility]. Called [phone]. Faxed face sheet, H&P, progress notes, and MAR to [phone]. Need meeting set up with family.
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
## 2023-02-21 NOTE — PROCEDURES
EEG REPORT       Patient: Joselyn Watson Age: 59 y.o. MRN: [id number]      Referring Provider: No ref. provider found    History: This routine 30 minute scalp EEG was recorded with video- monitoring for a 59 y.o. [place] female who presented with encephalopathy. This EEG was performed to evaluate for focal and epileptiform abnormalities.      Blanca Garcia   Current Facility-Administered Medications   Medication Dose Route Frequency Provider Last Rate Last Admin    [START ON 2/22/2023] pantoprazole (PROTONIX) tablet 40 mg  40 mg Oral QAM AC Hoa Barbosa MD        calcium carbonate (TUMS) chewable tablet 500 mg  500 mg Oral TID PRN Hoa Barbosa MD        dextrose 5 % and 0.9 % NaCl with KCl 20 mEq infusion   IntraVENous Continuous Hoa Barbosa MD 50 mL/hr at 02/21/23 0957 New Bag at 02/21/23 0957    docusate sodium (COLACE) capsule 100 mg  100 mg Oral BID PRN Hoa Barbosa MD        polyethylene glycol (GLYCOLAX) packet 17 g  17 g Oral Daily PRN Hoa Barbosa MD        glucose chewable tablet 16 g  4 tablet Oral PRN Jorge Ernst MD        dextrose bolus 10% 125 mL  125 mL IntraVENous PRN Jorge Ernst MD        Or    dextrose bolus 10% 250 mL  250 mL IntraVENous PRN Jorge Ernst MD        glucagon (rDNA) injection 1 mg  1 mg SubCUTAneous PRN Jorge Ernst MD        dextrose 10 % infusion   IntraVENous Continuous PRN Jorge Ernst MD        sodium chloride flush 0.9 % injection 5-40 mL  5-40 mL IntraVENous 2 times per day Jorge Ernst MD   10 mL at 02/21/23 0959    sodium chloride flush 0.9 % injection 5-40 mL  5-40 mL IntraVENous PRN Jorge Ernst MD        0.9 % sodium chloride infusion   IntraVENous PRN Jorge Ernst MD        LORazepam (ATIVAN) injection 1 mg  1 mg IntraVENous Q5 Min PRN Jorge Ernst MD        enoxaparin (LOVENOX) injection 40 mg  40 mg SubCUTAneous Daily Jorge Ernst MD   40 mg at 02/21/23 0956    ondansetron (ZOFRAN-ODT) disintegrating tablet 4 mg  4 mg Oral Q8H PRN Yoel Hilliard MD        Or    ondansetron [facility] injection 4 mg  4 mg IntraVENous Q6H PRN Yoel Hilliard MD   4 mg at 02/21/23 0414    acetaminophen (TYLENOL) tablet 650 mg  650 mg Oral Q6H PRN Yoel Hilliard MD        Or    acetaminophen (TYLENOL) suppository 650 mg  650 mg Rectal Q6H PRN Yoel Hilliard MD        insulin lispro (HUMALOG) injection vial 0-4 Units  0-4 Units SubCUTAneous Q4H Yoel Hilliard MD        lamoTRIgine (LAMICTAL) tablet 125 mg  125 mg Oral BID Yoel Hilliard MD   125 mg at 02/21/23 3684    lacosamide (VIMPAT) tablet 150 mg  150 mg Oral BID Yoel Hilliard MD   150 mg at 02/21/23 6502    levETIRAcetam (KEPPRA) tablet 1,000 mg  1,000 mg Oral BID Yoel Hilliard MD   1,000 mg at 02/21/23 5923    magnesium sulfate 1000 mg in dextrose 5% 100 mL IVPB  1,000 mg IntraVENous PRN Candance Rumpf Orlop, DO        potassium chloride (KLOR-CON M) extended release tablet 40 mEq  40 mEq Oral PRN Candance Rumpf Orlop, DO   40 mEq at 02/21/23 0139    Or    potassium bicarb-citric acid (EFFER-K) effervescent tablet 40 mEq  40 mEq Oral PRN Candance Rumpf Orlop, DO        Or    potassium chloride 10 mEq/100 mL IVPB (Peripheral Line)  10 mEq IntraVENous PRN Candance Rumpf Orlop,  mL/hr at 02/20/23 1744 10 mEq at 02/20/23 1744    potassium chloride (KLOR-CON M) extended release tablet 40 mEq  40 mEq Oral Once Eleazar Taylor MD        morphine (MS CONTIN) extended release tablet 15 mg  15 mg Oral BID Candance Rumpf Orlop, DO   15 mg at 02/21/23 0959    oxyCODONE-acetaminophen (PERCOCET) 5-325 MG per tablet 1 tablet  1 tablet Oral Q4H PRN Candance Rumpf Orlop, DO        ciprofloxacin (CIPRO) IVPB 400 mg  400 mg IntraVENous Q12H Burke Lundborg, DO   Stopped at 02/21/23 0543       Technical Description: This is a 21 channel digital EEG recording with time-locked video.  Electrodes were placed in accordance with the 10-20 International System of Electrode Placement. Single lead EKG monitoring as well as temporal electrodes were included. The patient was not sleep deprived. This recording was obtained during wakefulness. EEG Description: The dominant background activity during maximal recorded wakefulness consisted of 5-6 Hz of polymorphic theta activity of 25-40 uV. There was poor anterior posterior amplitude gradient. There was no asymmetry between the two hemispheres. Sleep architecture was not seen. Activation procedures were not performed. The EKG channel demonstrated a normal sinus rhythm. Interpretation  This EEG was abnormal due to disorganized and slow background in polymorphic theta frequency. Clinical correlation  This EEG was abnormal. Disorganized and slow background suggested mild to moderate encephalopathy of non specific etiology.      Tay Donato MD  Diplomate, American Board of Psychiatry and Neurology  Diplomate, American Board of Clinical Neurophysiology  Diplomate, American Board of Epilepsy
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
## 2023-02-21 NOTE — PROGRESS NOTES
[facility]  Progress Note                   Date:   2/21/2023  Patient name: Wilfredo Pyle [facility]  Date of admission:  2/19/2023  5:59 PM  MRN:   [id number]  YOB: 1965  PCP: Lila Villalta DO    Reason for Admission: Seizure ([facility] [address] [R56.9]  Altered mental status, unspecified altered mental status type [R41.82]    Subjective:       Clinical Changes /Abnormalities:Patient seen and examined. Denies chest pain , on BiPAP with no distress Tele/vitals/labs reviewed . Sinus rhythm on telemetry       Review of Systems    Medications:   Scheduled Meds:   [START ON 2/22/2023] pantoprazole  40 mg Oral QAM AC    sodium chloride flush  5-40 mL IntraVENous 2 times per day    enoxaparin  40 mg SubCUTAneous Daily    insulin lispro  0-4 Units SubCUTAneous Q4H    lamoTRIgine  125 mg Oral BID    lacosamide  150 mg Oral BID    levETIRAcetam  1,000 mg Oral BID    potassium chloride  40 mEq Oral Once    morphine  15 mg Oral BID    ciprofloxacin  400 mg IntraVENous Q12H     Continuous Infusions:   dextrose 5% and 0.9% NaCl with KCl 20 mEq 50 mL/hr at 02/21/23 0957    dextrose      sodium chloride       CBC:   Recent Labs     02/19/23 1812 02/20/23 1749 02/21/23  0225   WBC 9.5  --  9.6   HGB 12.5 11.3* 11.8*   *  --  349     BMP:    Recent Labs     02/19/23 1812 02/19/23  2340 02/20/23  0916 02/20/23 1749 02/20/23  1955 02/21/23  0225     --  141 136  --  136   K 3.5*  --  2.8* 3.7 3.5* 3.6*   CL 94*  --  103 103  --  104   CO2 23  --  26 23  --  22   BUN 7  --  6  --   --  6   CREATININE 0.63 0.53 0.23*  --   --  0.28*   GLUCOSE 181*  --  98  --   --  113*     Hepatic:  Recent Labs     02/20/23  0916   AST 26   ALT 6   BILITOT 0.2*   ALKPHOS 66     Troponin:   Recent Labs     02/19/23  1916 02/20/23  0233 02/20/23  0656   TROPHS 31* 40* 295*     BNP: No results for input(s): BNP in the last 72 hours. Lipids: No results for input(s): CHOL, HDL in the last 72 hours.     Invalid input(s): LDLCALCU  INR:   Recent Labs     02/20/23  0626   INR 1.1     EKG:    Date: 02/20/23  Reading:   Sinus tachycardia with occasional , and consecutive Premature ventricular complexes and Fusion complexes  Indeterminate axis  Septal infarct (cited on or before 10-OCT-2022)  Abnormal ECG  When compared with ECG of 10-OCT-2022 05:05,  Premature ventricular complexes are now Present  QRS axis Shifted right  ST now depressed in Anterior leads  T wave inversion no longer evident in Inferior leads  Nonspecific T wave abnormality now evident in Lateral leads     LAST ECHO:  Date: 2/22  Findings Summary:  Limited study  Global left ventricular systolic function is normal.  Estimated ejection fraction is 55 % . Small to moderate pericardial effusion seen mostly posteriorly, somewhat  improved from last study in 06/2021. No echo signs of tamponade. LAST Stress Test:   NA     LAST Cardiac Angiography:.  Date: 1/21  Findings:  Procedure Summary  Single vessel CAD (Small PDA)  Lower normal LV function     Objective:   Vitals: /73   Pulse 76   Temp 97.7 °F (36.5 °C) (Oral)   Resp 21   Wt 137 lb 5.6 oz (62.3 kg)   SpO2 100%   BMI 22.86 kg/m²   General appearance: alert and cooperative with exam  HEENT: Head: Normocephalic, no lesions, without obvious abnormality. Neck:no JVD, trachea midline, no adenopathy  Lungs: Clear to auscultation  Heart: Regular rate and rhythm, s1/s2 auscultated, no murmurs  Abdomen: soft, non-tender, bowel sounds active  Extremities: no edema  Neurologic: not done        Assessment / Acute Cardiac Problems:   Seizure disorder. On 3 antiepileptic drugs  HS trop elevation Trend has been 31>31>40>295  Hematuria?  May be myoglobin in the urine although not significantly elevated   Type 2 diabetes  History of ovarian cancer with mets   Hypokalemia  Had pericardial effusion seen on limited echo 1 year ago   Status post recent vascular embolization       Patient Active Problem List:     Epithelial ovarian cancer, FIGO stage OSIRIS (HCC)     Goals of care, counseling/discussion     Anemia     Splenic abscess     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 ([address]     Acute encephalopathy     PRES (posterior reversible encephalopathy syndrome)     Hemianopia, bitemporal     Encephalopathy     Seizure disorder, status epilepticus, nonconvulsive (HCC)     History of ovarian cancer     Pleural effusion     Bandemia     Cancer, metastatic to bone (HCC)     Intractable low back pain     Fatigue     Chronic deep vein thrombosis (DVT) of femoral vein of left lower extremity (HCC)     Iron deficiency anemia secondary to inadequate dietary iron intake     Iron malabsorption     Gynecologic cancer (HCC)     Thrombocytosis     History of deep venous thrombosis (DVT) of distal vein of left lower extremity: On Eliquis     Pelvic mass     Acute on chronic anemia     AMS (altered mental status)     Lactic acidosis     Anxiety     Diabetes mellitus (HCC)     Gastroesophageal reflux disease     Recurrent major depressive disorder, in partial remission (HCC)     Ileus (HCC)     Pericardial effusion     Hypokalemia     Metabolic encephalopathy     Delirium due to another medical condition     Urinary tract infection without hematuria     Seizure disorder (HCC)     Leg swelling     Iron deficiency     Drug-induced constipation     Septicemia (HCC)     Hallucinations     Cerebrovascular accident (CVA) ([address]     Difficulty with speech     MDD (major depressive disorder), recurrent severe, without psychosis ([address]     Seizure ([address]     Somnolence     Calculus of gallbladder without cholecystitis without obstruction     Intestinal adhesions with partial obstruction (HCC)     Traumatic rhabdomyolysis ([address]      Plan of Treatment:   Elevated troponin -could be secondary to co-morbidities  but  did have a bump in her troponin denies chest pain-awaiting echo to rule to rule out wall motion abnormality  metastatic ovarian cancer followed by oncology  Cholelithiasis- need percutaneous cholecystostomy tube as patient is a very poor surgical candidate per general surgery   Keep K>4, Mg>2      Electronically signed by Efrem Pleitez, APRN - NP on 2/21/2023 at [facility].  [phone]

## 2023-02-21 NOTE — PROGRESS NOTES
Physical Therapy        Physical Therapy Cancel Note      DATE: 2023    NAME: Brady Fischer  MRN: [id number]   : 1965      Patient not seen this date for Physical Therapy due to:    Patient is not appropriate for PT evaluation/treatment at this time d/t off the floor for testing. PT will check back as time allows.       Electronically signed by Sameer Guzman PT on 2023 at 10:59 AM

## 2023-02-22 ENCOUNTER — APPOINTMENT (OUTPATIENT)
Dept: GENERAL RADIOLOGY | Age: 58
DRG: 056 | End: 2023-02-22
Payer: COMMERCIAL

## 2023-02-22 LAB
GLUCOSE BLD-MCNC: 107 MG/DL (ref 65–105)
GLUCOSE BLD-MCNC: 116 MG/DL (ref 65–105)
GLUCOSE BLD-MCNC: 124 MG/DL (ref 65–105)
GLUCOSE BLD-MCNC: 126 MG/DL (ref 65–105)
GLUCOSE BLD-MCNC: 81 MG/DL (ref 65–105)
GLUCOSE BLD-MCNC: 97 MG/DL (ref 65–105)
LV EF: 38 %
LVEF MODALITY: NORMAL

## 2023-02-22 PROCEDURE — 6370000000 HC RX 637 (ALT 250 FOR IP): Performed by: CLINICAL NURSE SPECIALIST

## 2023-02-22 PROCEDURE — 97535 SELF CARE MNGMENT TRAINING: CPT

## 2023-02-22 PROCEDURE — 82947 ASSAY GLUCOSE BLOOD QUANT: CPT

## 2023-02-22 PROCEDURE — 2060000000 HC ICU INTERMEDIATE R&B

## 2023-02-22 PROCEDURE — 6370000000 HC RX 637 (ALT 250 FOR IP): Performed by: INTERNAL MEDICINE

## 2023-02-22 PROCEDURE — 97530 THERAPEUTIC ACTIVITIES: CPT

## 2023-02-22 PROCEDURE — 6370000000 HC RX 637 (ALT 250 FOR IP): Performed by: FAMILY MEDICINE

## 2023-02-22 PROCEDURE — 6360000002 HC RX W HCPCS: Performed by: INTERNAL MEDICINE

## 2023-02-22 PROCEDURE — 74018 RADEX ABDOMEN 1 VIEW: CPT

## 2023-02-22 PROCEDURE — 2580000003 HC RX 258: Performed by: INTERNAL MEDICINE

## 2023-02-22 PROCEDURE — 99232 SBSQ HOSP IP/OBS MODERATE 35: CPT | Performed by: INTERNAL MEDICINE

## 2023-02-22 PROCEDURE — 99232 SBSQ HOSP IP/OBS MODERATE 35: CPT | Performed by: FAMILY MEDICINE

## 2023-02-22 PROCEDURE — 93306 TTE W/DOPPLER COMPLETE: CPT

## 2023-02-22 RX ORDER — LORAZEPAM 0.5 MG/1
0.5 TABLET ORAL EVERY 8 HOURS PRN
Status: DISCONTINUED | OUTPATIENT
Start: 2023-02-22 | End: 2023-02-25

## 2023-02-22 RX ORDER — LEVETIRACETAM 1000 MG/1
1000 TABLET ORAL 2 TIMES DAILY
Qty: 60 TABLET | Refills: 3 | Status: SHIPPED | OUTPATIENT
Start: 2023-02-22 | End: 2023-03-05 | Stop reason: SDUPTHER

## 2023-02-22 RX ADMIN — PANTOPRAZOLE SODIUM 40 MG: 40 TABLET, DELAYED RELEASE ORAL at 09:57

## 2023-02-22 RX ADMIN — LAMOTRIGINE 125 MG: 100 TABLET ORAL at 09:53

## 2023-02-22 RX ADMIN — SODIUM CHLORIDE, PRESERVATIVE FREE 10 ML: 5 INJECTION INTRAVENOUS at 20:24

## 2023-02-22 RX ADMIN — CIPROFLOXACIN 400 MG: 2 INJECTION, SOLUTION INTRAVENOUS at 04:58

## 2023-02-22 RX ADMIN — LOPERAMIDE HYDROCHLORIDE 2 MG: 2 CAPSULE ORAL at 11:08

## 2023-02-22 RX ADMIN — LOPERAMIDE HYDROCHLORIDE 2 MG: 2 CAPSULE ORAL at 05:02

## 2023-02-22 RX ADMIN — MORPHINE SULFATE 15 MG: 15 TABLET, FILM COATED, EXTENDED RELEASE ORAL at 09:52

## 2023-02-22 RX ADMIN — OXYCODONE HYDROCHLORIDE AND ACETAMINOPHEN 1 TABLET: 5; 325 TABLET ORAL at 21:29

## 2023-02-22 RX ADMIN — MORPHINE SULFATE 15 MG: 15 TABLET, FILM COATED, EXTENDED RELEASE ORAL at 20:23

## 2023-02-22 RX ADMIN — CIPROFLOXACIN 400 MG: 2 INJECTION, SOLUTION INTRAVENOUS at 18:09

## 2023-02-22 RX ADMIN — OXYCODONE HYDROCHLORIDE AND ACETAMINOPHEN 1 TABLET: 5; 325 TABLET ORAL at 01:43

## 2023-02-22 RX ADMIN — SODIUM CHLORIDE, PRESERVATIVE FREE 10 ML: 5 INJECTION INTRAVENOUS at 17:34

## 2023-02-22 RX ADMIN — LORAZEPAM 0.5 MG: 0.5 TABLET ORAL at 21:33

## 2023-02-22 RX ADMIN — ENOXAPARIN SODIUM 40 MG: 100 INJECTION SUBCUTANEOUS at 09:51

## 2023-02-22 RX ADMIN — LACOSAMIDE 150 MG: 100 TABLET, FILM COATED ORAL at 09:54

## 2023-02-22 RX ADMIN — LEVETIRACETAM 1000 MG: 500 TABLET, FILM COATED ORAL at 09:52

## 2023-02-22 RX ADMIN — OXYCODONE HYDROCHLORIDE AND ACETAMINOPHEN 1 TABLET: 5; 325 TABLET ORAL at 17:33

## 2023-02-22 RX ADMIN — OXYCODONE HYDROCHLORIDE AND ACETAMINOPHEN 1 TABLET: 5; 325 TABLET ORAL at 12:10

## 2023-02-22 RX ADMIN — LEVETIRACETAM 1000 MG: 500 TABLET, FILM COATED ORAL at 20:23

## 2023-02-22 RX ADMIN — ONDANSETRON 4 MG: 4 TABLET, ORALLY DISINTEGRATING ORAL at 12:10

## 2023-02-22 RX ADMIN — SODIUM CHLORIDE 25 ML: 9 INJECTION, SOLUTION INTRAVENOUS at 18:08

## 2023-02-22 RX ADMIN — LAMOTRIGINE 125 MG: 100 TABLET ORAL at 20:23

## 2023-02-22 RX ADMIN — OXYCODONE HYDROCHLORIDE AND ACETAMINOPHEN 1 TABLET: 5; 325 TABLET ORAL at 06:55

## 2023-02-22 RX ADMIN — LACOSAMIDE 150 MG: 100 TABLET, FILM COATED ORAL at 20:23

## 2023-02-22 ASSESSMENT — PAIN DESCRIPTION - LOCATION
LOCATION: ABDOMEN

## 2023-02-22 ASSESSMENT — PAIN DESCRIPTION - ORIENTATION: ORIENTATION: LOWER

## 2023-02-22 ASSESSMENT — ENCOUNTER SYMPTOMS
CONSTIPATION: 0
BLOOD IN STOOL: 0
NAUSEA: 0
ABDOMINAL PAIN: 0
VOMITING: 0
CHEST TIGHTNESS: 0
SHORTNESS OF BREATH: 1
DIARRHEA: 0
WHEEZING: 0
COUGH: 0
RHINORRHEA: 0

## 2023-02-22 ASSESSMENT — PAIN SCALES - GENERAL
PAINLEVEL_OUTOF10: 7
PAINLEVEL_OUTOF10: 10
PAINLEVEL_OUTOF10: 8
PAINLEVEL_OUTOF10: 8
PAINLEVEL_OUTOF10: 9
PAINLEVEL_OUTOF10: 7
PAINLEVEL_OUTOF10: 8

## 2023-02-22 ASSESSMENT — PAIN - FUNCTIONAL ASSESSMENT: PAIN_FUNCTIONAL_ASSESSMENT: ACTIVITIES ARE NOT PREVENTED

## 2023-02-22 NOTE — PROGRESS NOTES
[facility]  Progress Note                   Date:   2/22/2023  Patient name: Ava Barcenas [facility]  Date of admission:  2/19/2023  5:59 PM  MRN:   [id number]  YOB: 1965  PCP: Man Carlin DO    Reason for Admission: Seizure ([facility] [address] [R56.9]  Altered mental status, unspecified altered mental status type [R41.82]    Subjective:       Clinical Changes /Abnormalities:Patient seen and examined. Denies chest pain , or SOB , resting on RA with no distress  Tele/vitals/labs reviewed . Sinus rhythm on telemetry       Review of Systems    Medications:   Scheduled Meds:   pantoprazole  40 mg Oral QAM AC    LORazepam  0.5 mg IntraVENous Once    sodium chloride flush  5-40 mL IntraVENous 2 times per day    enoxaparin  40 mg SubCUTAneous Daily    insulin lispro  0-4 Units SubCUTAneous Q4H    lamoTRIgine  125 mg Oral BID    lacosamide  150 mg Oral BID    levETIRAcetam  1,000 mg Oral BID    potassium chloride  40 mEq Oral Once    morphine  15 mg Oral BID    ciprofloxacin  400 mg IntraVENous Q12H     Continuous Infusions:   dextrose      sodium chloride 25 mL (02/21/23 1807)     CBC:   Recent Labs     02/19/23  1812 02/20/23  1749 02/21/23  0225   WBC 9.5  --  9.6   HGB 12.5 11.3* 11.8*   *  --  349       BMP:    Recent Labs     02/19/23  1812 02/19/23  2340 02/20/23  0916 02/20/23  1749 02/20/23  1955 02/21/23  0225     --  141 136  --  136   K 3.5*  --  2.8* 3.7 3.5* 3.6*   CL 94*  --  103 103  --  104   CO2 23  --  26 23  --  22   BUN 7  --  6  --   --  6   CREATININE 0.63 0.53 0.23*  --   --  0.28*   GLUCOSE 181*  --  98  --   --  113*       Hepatic:  Recent Labs     02/20/23  0916   AST 26   ALT 6   BILITOT 0.2*   ALKPHOS 66       Troponin:   Recent Labs     02/20/23  0233 02/20/23  0656 02/21/23  1628   TROPHS 40* 295* 100*       BNP: No results for input(s): BNP in the last 72 hours. Lipids: No results for input(s): CHOL, HDL in the last 72 hours.     Invalid input(s): LDLCALCU  INR:   Recent Labs     02/20/23  0626   INR 1.1       EKG:    Date: 02/20/23  Reading:   Sinus tachycardia with occasional , and consecutive Premature ventricular complexes and Fusion complexes  Indeterminate axis  Septal infarct (cited on or before 10-OCT-2022)  Abnormal ECG  When compared with ECG of 10-OCT-2022 05:05,  Premature ventricular complexes are now Present  QRS axis Shifted right  ST now depressed in Anterior leads  T wave inversion no longer evident in Inferior leads  Nonspecific T wave abnormality now evident in Lateral leads     LAST ECHO:  Date: 2/22  Findings Summary:  Limited study  Global left ventricular systolic function is normal.  Estimated ejection fraction is 55 % . Small to moderate pericardial effusion seen mostly posteriorly, somewhat  improved from last study in 06/2021. No echo signs of tamponade. LAST Stress Test:   NA     LAST Cardiac Angiography:.  Date: 1/21  Findings:  Procedure Summary  Single vessel CAD (Small PDA)  Lower normal LV function     Objective:   Vitals: /67   Pulse 99   Temp 98.4 °F (36.9 °C) (Oral)   Resp 19   Wt 137 lb 5.6 oz (62.3 kg)   SpO2 98%   BMI 22.86 kg/m²   General appearance: alert and cooperative with exam  HEENT: Head: Normocephalic, no lesions, without obvious abnormality. Neck:no JVD, trachea midline, no adenopathy  Lungs: Clear to auscultation  Heart: Regular rate and rhythm, s1/s2 auscultated, no murmurs  Abdomen: soft, non-tender, bowel sounds active  Extremities: no edema  Neurologic: not done        Assessment / Acute Cardiac Problems:   Seizure disorder. On 3 antiepileptic drugs  HS trop elevation Trend has been 31>31>40>295>100  Hematuria?  May be myoglobin in the urine although not significantly elevated   Type 2 diabetes  History of ovarian cancer with mets   Hypokalemia  Had pericardial effusion seen on limited echo 1 year ago   Status post recent vascular embolization       Patient Active Problem List: Epithelial ovarian cancer, FIGO stage OSIRIS ([address]     Goals of care, counseling/discussion     Anemia     Splenic abscess     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 ([address]     Acute encephalopathy     PRES (posterior reversible encephalopathy syndrome)     Hemianopia, bitemporal     Encephalopathy     Seizure disorder, status epilepticus, nonconvulsive (HCC)     History of ovarian cancer     Pleural effusion     Bandemia     Cancer, metastatic to bone (HCC)     Intractable low back pain     Fatigue     Chronic deep vein thrombosis (DVT) of femoral vein of left lower extremity (HCC)     Iron deficiency anemia secondary to inadequate dietary iron intake     Iron malabsorption     Gynecologic cancer (HCC)     Thrombocytosis     History of deep venous thrombosis (DVT) of distal vein of left lower extremity: On Eliquis     Pelvic mass     Acute on chronic anemia     AMS (altered mental status)     Lactic acidosis     Anxiety     Diabetes mellitus (HCC)     Gastroesophageal reflux disease     Recurrent major depressive disorder, in partial remission (HCC)     Ileus (HCC)     Pericardial effusion     Hypokalemia     Metabolic encephalopathy     Delirium due to another medical condition     Urinary tract infection without hematuria     Seizure disorder (HCC)     Leg swelling     Iron deficiency     Drug-induced constipation     Septicemia (HCC)     Hallucinations     Cerebrovascular accident (CVA) ([address]     Difficulty with speech     MDD (major depressive disorder), recurrent severe, without psychosis ([address]     Seizure ([address]     Somnolence     Calculus of gallbladder without cholecystitis without obstruction     Intestinal adhesions with partial obstruction (HCC)     Traumatic rhabdomyolysis ([address]      Plan of Treatment:   Elevated troponin, trending down  -could be secondary to co-morbidities - denies chest pain-awaiting echo to rule to rule out wall motion abnormality  metastatic ovarian cancer followed by oncology  Cholelithiasis- need percutaneous cholecystostomy tube as patient is a very poor surgical candidate per general surgery   Keep K>4, Mg>2      Electronically signed by ESTRELLITA Car NP on 2/22/2023 at 2:38 PM  [id number] [address].  [phone]
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
## 2023-02-22 NOTE — PROGRESS NOTES
9/19/2017       RE: Becca Garcia  [address]  [place] [address]     Dear Colleague,    Thank you for referring your patient, Becca Garcia, to the [facility] at [facility]. Please see a copy of my visit note below.        HISTORY OF PRESENT ILLNESS: Becca Garcia is a 41 year old female with a history of a lump in the throat sensation since July 26, 2017. This was the day before she was going to visit a friend with a serious disease. She'll resist as she didn't want to be sick at that time. She notes since then she has had a continuous sensation of a lump in the throat. She tends to be cold most of the time and had a thyroid ultrasound and laboratory evaluation normal. She has no swallowing difficulties no airway difficulties. She does have vocal fatigue. She has no neck pain but a constant sensation of tightness in her lower throat. She notes that today she has vocal fatigue. She is quite active both at work and at home.    Last 2 Scores for Patient-Answered VHI Questionnaire  VHI Total Score 9/17/2017   VHI Total Score 0       Last 2 Scores for Patient-Answered CSI Questionnaire  CSI Total Score 9/17/2017   CSI Total Score 0         Last 2 Scores for Patient-Answered EAT Questionnaire  EAT Total Score 9/17/2017   EAT Total Score 0           PAST MEDICAL HISTORY:   Past Medical History:   Diagnosis Date     Cyst of ovary        PAST SURGICAL HISTORY:   Past Surgical History:   Procedure Laterality Date     cryotherapy      for cervical dysplasia       FAMILY HISTORY:   Family History   Problem Relation Age of Onset     Other Cancer Father      Hairy Cell Leukemia     HEART DISEASE Father      MI s/p stent     Breast Cancer Paternal Aunt      2 paternal aunts.        SOCIAL HISTORY:   Social History   Substance Use Topics     Smoking status: Never Smoker     Smokeless tobacco: Never Used     Alcohol use 0.0 oz/week     0 Standard drinks or  Pt refused to wear BiPap/CPAP. RN notified and will call if needed. equivalent per week      Comment: socially       REVIEW OF SYSTEMS: Ten point review of systems was performed and is negative except for:    ENT ROS 9/7/2017   Constitutional Unexplained fatigue   Ears, Nose, Throat Trouble swallowing   Musculoskeletal Back pain        ALLERGIES: Review of patient's allergies indicates no known allergies.    MEDICATIONS:   Current Outpatient Prescriptions   Medication Sig Dispense Refill     NO ACTIVE MEDICATIONS             PHYSICAL EXAMINATION:  She  is awake, alert and in no apparent distress.    Her tympanic membranes are clear and intact bilaterally. External auditory canals are clear.  Nasal exam shows a mild septal deviation without obstruction.  Examination of the oral cavity shows no suspicious lesions.  There is symmetric movement of the tongue and soft palate.    The oropharynx is clear.  Her neck is supple without significant adenopathy. There is mild tenderness to palpation thyromegaly no muscle. Palpation at the level of the suprasternal notch in the midline also has mild pressure sensation without pain. This is at the proximal level of the  cricopharyngeus muscle. Pulse is regular.  Upper airway is clear.  Cranial nerves II-XII are grossly intact.       PROCEDURE: A flexible laryngoscopy  was performed.  Informed consent was obtained and a time out was performed. 3% lidocaine and 0.25% phenylephrine was sprayed into the nasal cavity and allowed 3 to 5 minutes for effect. The scope was passed through the right sided nostril. Examination showed the vocal folds to be mobile and meet in the midline.  No nodules, polyps or ulcerations are seen.  There is minimal to no inflammation or erythema of the supraglottic or glottic larynx.  With phonation there is moderatecontraction of the supraglottic larynx.  The hypopharynx is otherwise clear as is the subglottis.     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 And vocal fatigue. She likely  has mild gastroesophageal reflux with cricopharyngeus muscle spasm, tightness. She also has a normal laryngeal exam from an anatomic standpoint.      PLAN: I recommended a brief trial of speech therapy for the muscle tension dysphonia. Going to give her an antireflux dietary she dietary and habitual recommendations. We are not initiate any medical therapy at this time. All questions were answered and I'll have her follow up on a p.r.n. basis.      Again, thank you for allowing me to participate in the care of your patient.      Sincerely,    Mateus Doll MD
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
## 2023-02-22 NOTE — PROGRESS NOTES
Occupational [address]  Occupational Therapy Not Seen Note    DATE: 2023    NAME: Odette Barragan  MRN: [id number]   : 1965      Patient not seen this date for Occupational Therapy due to:    Pt. Of floor at [facility].     Electronically signed by BAYRON Goldstein on 2023 at 3:07 PM

## 2023-02-22 NOTE — PROGRESS NOTES
Physical Therapy  Facility/Department: [facility]  Daily Treatment note     Name: Heike Olson  : 1965  MRN: [id number]  Date of Service: 2023    Discharge Recommendations:  Patient would benefit from continued therapy after discharge   PT Equipment Recommendations  Equipment Needed: No      Patient Diagnosis(es): The encounter diagnosis was Altered mental status, unspecified altered mental status type. Past Medical History:  has a past medical history of Anemia, Bleeding, Calculus of gallbladder without cholecystitis without obstruction, Cervical cancer ([address] Depression, Diabetes mellitus ([address] GERD (gastroesophageal reflux disease), Hx of blood clots, Hypertension, Intestinal adhesions with partial obstruction ([address] Metastatic cancer ([address] Ovarian cancer ([address] Post chemo evaluation, PRES (posterior reversible encephalopathy syndrome), Somnolence, Splenic lesion, and Traumatic rhabdomyolysis ([facility] Past Surgical History:  has a past surgical history that includes Hysterectomy, total abdominal; Port Surgery; Tonsillectomy; IR PORT PLACEMENT > 5 YEARS (2020); Anus surgery; Abscess Drainage (); colectomy (2013); IR EMBOLIZATION HEMORRHAGE (10/05/2020); and Cardiac catheterization. Assessment   Body Structures, Functions, Activity Limitations Requiring Skilled Therapeutic Intervention: Decreased functional mobility ; Decreased endurance; Increased pain;Decreased balance;Decreased safe awareness  Assessment: The pt limited this by dizziness, able to amb 2 ft lateral and performed standing Marches x10 with MIN A HHA. Debbie Dale Recommend continued PT to address deficits and maximize safety. Therapy Prognosis: Fair  Requires PT Follow-Up: Yes  Activity Tolerance  Activity Tolerance: Patient limited by pain; Patient limited by endurance;Treatment limited secondary to medical complications  Activity Tolerance Comments: Dizziness     Plan   Physcial Therapy Plan  General Plan: 3-5 times per week  Current Treatment Recommendations: Strengthening, ROM, Balance training, Functional mobility training, Transfer training, Endurance training, Gait training, Stair training, Home exercise program, Safety education & training, Therapeutic activities, Patient/Caregiver education & training  Safety Devices  Type of Devices: Nurse notified, Gait belt, Left in bed, Bed alarm in place, Call light within reach, Patient at risk for falls  Restraints  Restraints Initially in Place: No     Restrictions  Restrictions/Precautions  Restrictions/Precautions: Fall Risk, Seizure  Required Braces or Orthoses?: No  Position Activity Restriction  Other position/activity restrictions: 6/10 abdominal pain, pt. held pillow during movement, and re positioning provided. Subjective   General  Patient assessed for rehabilitation services?: Yes  Family / Caregiver Present: No      Cognition   Orientation  Overall Orientation Status: Within Functional Limits  Cognition  Overall Cognitive Status: WFL  Insights: Not aware of deficits  Initiation: Requires cues for some  Sequencing: Requires cues for some  Cognition Comment: Pt very impulsive and demo poor safety awareness. Objective      Bed Mobility Training  Bed Mobility Training: Yes  Overall Level of Assistance: Minimum assistance;Assist X1;Additional time  Interventions: Verbal cues; Tactile cues; Safety awareness training (Edu on slow moving to prevent dizziness.)  Supine to Sit: Minimum assistance;Assist X1  Sit to Supine: Contact-guard assistance; Additional time  Scooting: Contact-guard assistance  Balance  Sitting:  (CGA-SBA, static/dynamic, ~15 minutes)  Standing: With support (Min A hand held assist, ~3 minutes, static/dynamic)  Transfer Training  Transfer Training: Yes  Overall Level of Assistance: Contact-guard assistance;Minimum assistance;Assist X1;Additional time  Interventions: Verbal cues; Tactile cues; Safety awareness training  Sit to Stand: Assist X1;Minimum assistance;Contact-guard assistance; Additional time  Stand to Sit: Assist X1;Minimum assistance; Additional time;Contact-guard assistance  Gait  Overall Level of Assistance:  (N/T d/t dizziness)  Bed mobility  Rolling to Left: Stand by assistance  Rolling to Right: Stand by assistance  Supine to Sit: Minimal assistance  Sit to Supine: Contact guard assistance  Bed Mobility Comments: [place]ling for pericare. Cueing for hand placement and sequencing required. Pt very impulsive requiring Cueing for safety and sequencing  Transfers  Sit to Stand: Minimal Assistance  Stand to Sit: Minimal Assistance  Comment: STs x3 MIN A HHA, pt c/o dizziness t/o limiting treatment session  Ambulation  Surface: Level tile  Device: Hand-Held Assist  Assistance: Minimal assistance  Quality of Gait: unsteady, posterior lean  Gait Deviations: Slow Cynthia;Decreased step length;Decreased step height; Increased TAWANDA  Distance: 2 ft lateral , Marching in Place x10 reps  Comments: Limited by pt c/o dizziness  More Ambulation?: No     Balance  Posture: Fair  Sitting - Static: Good;-  Sitting - Dynamic: Fair;+  Standing - Static: Fair;+  Standing - Dynamic: Fair  Dynamic Standing Balance Exercises: Marching x10 reps      AM-PAC Score  AM-PAC Inpatient Mobility Raw Score : 17 (02/22/23 1602)  AM-PAC Inpatient T-Scale Score : 42.13 (02/22/23 1602)  Mobility Inpatient CMS 0-100% Score: 50.57 (02/22/23 1602)  Mobility Inpatient CMS G-Code Modifier : CK (02/22/23 1602)   Goals  Short Term Goals  Time Frame for Short Term Goals: 14 visits  Short Term Goal 1: Perform bed mobility and functional transfers independently  Short Term Goal 2: Ambulate 200ft with RW and supervision  Short Term Goal 3: Demo Good- dynamic standing balance to decrease risk of falls       Education  Patient Education  Education Given To: Patient  Education Provided: Role of Therapy;Plan of Care;Transfer Training  Education Method: Verbal  Barriers to Learning: Alexandro Outcome: Continued education needed;Verbalized understanding      Therapy Time   Individual Concurrent Group Co-treatment   Time In 8364         Time Out 1530         Minutes 13         Timed Code Treatment Minutes: 9 Kristy Estrada, PTA

## 2023-02-22 NOTE — PROGRESS NOTES
Occupational Therapy  Facility/Department: [facility]  Occupational Therapy Daily Progress Note    Name: Aida Govea  : 1965  MRN: [id number]  Date of Service: 2023    Discharge Recommendations:Pt. Would benefit from further skilled services. Patient would benefit from continued therapy after discharge          Patient Diagnosis(es): The encounter diagnosis was Altered mental status, unspecified altered mental status type. Past Medical History:  has a past medical history of Anemia, Bleeding, Calculus of gallbladder without cholecystitis without obstruction, Cervical cancer ([address] Depression, Diabetes mellitus ([address] GERD (gastroesophageal reflux disease), Hx of blood clots, Hypertension, Intestinal adhesions with partial obstruction ([address] Metastatic cancer ([address] Ovarian cancer ([address] Post chemo evaluation, PRES (posterior reversible encephalopathy syndrome), Somnolence, Splenic lesion, and Traumatic rhabdomyolysis ([facility] Past Surgical History:  has a past surgical history that includes Hysterectomy, total abdominal; Port Surgery; Tonsillectomy; IR PORT PLACEMENT > 5 YEARS (2020); Anus surgery; Abscess Drainage (); colectomy (2013); IR EMBOLIZATION HEMORRHAGE (10/05/2020); and Cardiac catheterization. Assessment   Performance deficits / Impairments: Decreased functional mobility ; Decreased ADL status; Decreased strength;Decreased safe awareness;Decreased cognition;Decreased fine motor control;Decreased high-level IADLs;Decreased balance;Decreased endurance;Decreased coordination  Prognosis: Fair  Decision Making: Medium Complexity  REQUIRES OT FOLLOW-UP: Yes  Activity Tolerance  Activity Tolerance: Patient limited by fatigue;Patient limited by pain;Treatment limited secondary to decreased cognition        Plan   Occupational Therapy Plan  Times Per Week: 3-4x/wk  Current Treatment Recommendations: Balance training, Strengthening, Functional mobility training, Endurance training, Patient/Caregiver education & training, Safety education & training, Equipment evaluation, education, & procurement, Home management training, Self-Care / ADL, Coordination training, Cognitive reorientation     Restrictions  Restrictions/Precautions  Restrictions/Precautions: Fall Risk, Seizure  Required Braces or Orthoses?: No  Position Activity Restriction  Other position/activity restrictions: 6/10 abdominal pain, pt. held pillow during movement, and re positioning provided. Subjective   General  Patient assessed for rehabilitation services?: Yes  Family / Caregiver Present: No  General Comment  Comments: RN ok'd for therapy this PM. Pt agreeable to participate in session and pleasant/cooperative throughout. Pt reports 6/10 abdominal pain, assisted pt to increase activity/reposition. Objective                Safety Devices  Type of Devices: Nurse notified;Gait belt;Left in bed;Bed alarm in place;Call light within reach  Restraints  Restraints Initially in Place: No  Bed Mobility Training  Bed Mobility Training: Yes  Overall Level of Assistance: Minimum assistance;Assist X1;Additional time  Interventions: Verbal cues; Tactile cues; Safety awareness training (Edu on slow moving to prevent dizziness.)  Supine to Sit: Minimum assistance;Assist X1  Sit to Supine: Contact-guard assistance; Additional time  Scooting: Contact-guard assistance  Balance  Sitting:  (CGA-SBA, static/dynamic, ~15 minutes)  Standing: With support (Min A hand held assist, ~3 minutes, static/dynamic)  Transfer Training  Transfer Training: Yes  Overall Level of Assistance: Contact-guard assistance;Minimum assistance;Assist X1;Additional time  Interventions: Verbal cues; Tactile cues; Safety awareness training  Sit to Stand: Assist X1;Minimum assistance;Contact-guard assistance; Additional time  Stand to Sit: Assist X1;Minimum assistance; Additional time;Contact-guard assistance  Gait  Overall Level of Assistance:  (N/T d/t dizziness)        ADL  Grooming: Setup;Verbal cueing; Increased time to complete;Contact guard assistance;Stand by assistance  Grooming Skilled Clinical Factors: CGA-SBA to ensure stability, sitting EOB. Wash face/brush teeth after set up. Unable to tolerate standing d/t dizziness. LE Dressing: Increased time to complete;Verbal cueing;Setup;Contact guard assistance;Stand by assistance  LE Dressing Skilled Clinical Factors: Sitting EOB, CGA-SBA, 4 figure tech. Toileting: Setup; Increased time to complete;Maximum assistance  Toileting Skilled Clinical Factors: Max A to change soiled brief in supine, pt. able to assist with bed mobility. Cognition  Overall Cognitive Status: WFL  Following Commands: Follows multistep commands with repitition  Attention Span: Attends with cues to redirect  Safety Judgement: Decreased awareness of need for assistance;Decreased awareness of need for safety  Insights: Not aware of deficits  Initiation: Requires cues for some  Sequencing: Requires cues for some  Cognition Comment: Pt very impulsive and demo poor safety awareness.   Orientation  Overall Orientation Status: Within Functional Limits                  Education Given To: Patient  Education Provided: Role of Therapy;Plan of Care  Education Method: Demonstration;Verbal  Barriers to Learning: Cognition  Education Outcome: Verbalized understanding;Continued education needed                                                    AM-PAC Score        AM-PAC Inpatient Daily Activity Raw Score: 18 (02/22/23 1541)  AM-PAC Inpatient ADL T-Scale Score : 38.66 (02/22/23 1541)  ADL Inpatient CMS 0-100% Score: 46.65 (02/22/23 1541)  ADL Inpatient CMS G-Code Modifier : CK (02/22/23 1541)           Goals  Short Term Goals  Time Frame for Short Term Goals: Pt will, by discharge:  Short Term Goal 1: Perform ADL tasks with mod IND using AE/DME PRN  Short Term Goal 2: Perform functional transfers/functional mobility with mod IND using least restrictive AD  Short Term Goal 3:  Independently demo good safety awareness/during engagement in all ADLs and functional transfers/functional mobility  Short Term Goal 4: Demo 10+ minutes standing tolerance with use of least restrictive AD for increased participation in ADL/IADL tasks       Therapy Time   Individual Concurrent Group Co-treatment   Time In 1511         Time Out 1539         Minutes 28         Timed Code Treatment Minutes: [address] [facility], NEFF/L
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
## 2023-02-22 NOTE — PROGRESS NOTES
PALLIATIVE CARE NURSING ASSESSMENT    Patient: Bebe Osborne  Room: 4772/9799-35    Reason For Consult   Goals of care evaluation  Distress management  Guidance and support  Facilitate communications  Assistance in coordinating care    Code Status: Full Code    Summary:   Palliative Care Visit to patient's room. Support and information shared. Pt relates she is not interested in meeting with hospice but she is interested in palliative care. Pt relates she would like to go home with home care, but is willing [facility]. Pt wants to see if she can improve her health enough to see if there is any additional ca treatment she can receive. Reviewed with Yoselin Crow what Palliative care is. Offered her palliative care clinic or palliative care that providers come to patient. Annemariemelyssa Crow wants palliative care to come to her. We discussed [facility] palliative care. Yoselin Crow relates she is interested in [place]. Called and left message for Samuel. Gave her my call back number. Reviewed what her mom and I spoke about. Spoke with  and bedside nurse that patient is interested in Palliative care-[facility]. Impression: Bebe Osborne is a 59y.o. year old female  has a past medical history of Anemia, Bleeding, Calculus of gallbladder without cholecystitis without obstruction, Cervical cancer ([address] Depression, Diabetes mellitus ([address] GERD (gastroesophageal reflux disease), Hx of blood clots, Hypertension, Intestinal adhesions with partial obstruction ([address] Metastatic cancer ([address] Ovarian cancer ([address] Post chemo evaluation, PRES (posterior reversible encephalopathy syndrome), Somnolence, Splenic lesion, and Traumatic rhabdomyolysis ([address] .  Currently hospitalized for the management of siezure. The Palliative Care Team is following to assist with patient and family support, goals of care.     Vital Signs  Blood pressure 123/67, pulse 87, temperature 97.7 °F (36.5 °C), temperature source Oral, resp. rate 16, weight 137 lb 5.6 oz (62.3 kg), SpO2 100 %.     Patient Active Problem List   Diagnosis    Epithelial ovarian cancer, FIGO stage OSIRIS ([address]    Goals of care, counseling/discussion    Anemia    Splenic abscess    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 ([address]    Acute encephalopathy    PRES (posterior reversible encephalopathy syndrome)    Hemianopia, bitemporal    Encephalopathy    Seizure disorder, status epilepticus, nonconvulsive (HCC)    History of ovarian cancer    Pleural effusion    Bandemia    Cancer, metastatic to bone (HCC)    Intractable low back pain    Fatigue    Chronic deep vein thrombosis (DVT) of femoral vein of left lower extremity (HCC)    Iron deficiency anemia secondary to inadequate dietary iron intake    Iron malabsorption    Gynecologic cancer (HCC)    Thrombocytosis    History of deep venous thrombosis (DVT) of distal vein of left lower extremity: On Eliquis    Pelvic mass    Acute on chronic anemia    AMS (altered mental status)    Lactic acidosis    Anxiety    Diabetes mellitus (HCC)    Gastroesophageal reflux disease    Recurrent major depressive disorder, in partial remission (HCC)    Ileus (HCC)    Pericardial effusion    Hypokalemia    Acute respiratory failure with hypoxia (HCC)    Metabolic encephalopathy    Delirium due to another medical condition    Urinary tract infection without hematuria    Seizure disorder (HCC)    Leg swelling    Iron deficiency    Drug-induced constipation    Septicemia (HCC)    Hallucinations    Cerebrovascular accident (CVA) ([address]    Difficulty with speech    MDD (major depressive disorder), recurrent severe, without psychosis ([address]    Seizure ([address]    Somnolence    Calculus of gallbladder without cholecystitis without obstruction    Intestinal adhesions with partial obstruction (HCC)    Traumatic rhabdomyolysis ([address]       Palliative Interaction:Reviewed course of care, updates per bedside nurse and . Pt is alert and oriented today. Met with patient this am. She is having liquid stools multiple and incontinent. She relates that her problem is the abdominal cramping today. Pt did not wear bipap last night. She is alert and oriented, appears uncomfortable in bed. I asked Robby Zamorano what her plans are and she relates home with home care if possible. She is agreeable [facility]. Robby Zamorano relates that she does not want to meet with hospice at this time. She wants to see if she can get stronger and then meet with oncology to see what her options are. She is agreeable with palliative care. We discussed going to SNF with rehab. We discussed palliative care outpatient. She relates she would like provider that comes to her as opposed to her having to go to see provider. Explained that palliative care is symptom management, support emotionally-physically and spiritually. Palliative care allows her to continue to follow with her physicians and continue treatments. Palliative care will follow with her to make sure her wishes align with her medical care. They are there to continuously reevaluate goals of care. Talked to her about two area companies that do palliative care at home and explained that they can come to SNF as well as follow patient at home. [address] as palliative care agencies available. Pt is interested in [address] I asked if I could call Andrea and let her know what we talked about. Robby Zamorano is agreeable. Called Andrea to update her on my conversation with her mom. I left message. I explained what we talked about and options for palliative care. Left my contact number if she has any questions, needs or concerns.      Goals/Plan of care  Education/support to staff  Education/support to family  Discharge planning/helping to coordinate care  Communications with primary service  Providing support for coping/adaptation/distress of patient  Discussing meaning/purpose   Continue with current plan of care  Validating patient/family distress  Continued communication updates  Recognizing, reflecting, and empathizing with the patient's anticipatory grief  Gave additional information relating to hospice care.         Palliative Care Coordinator  Leopold Chapel BSN, RN, ONN-CG    [facility] Office: [address] [facility] Office: [phone]  [facility] Office: [phone]    For Symptom Management Clinic scheduling please call [phone]

## 2023-02-22 NOTE — PROGRESS NOTES
Physical Therapy        Physical Therapy Cancel Note      DATE: 2023    NAME: Domonique Ray  MRN: [id number]   : 1965      Patient not seen this date for Physical Therapy due to:    Testing: Pt of the floor for ECHO      Electronically signed by Kell Hale PTA on 2023 at 3:04 PM

## 2023-02-23 ENCOUNTER — HOSPITAL ENCOUNTER (OUTPATIENT)
Dept: INFUSION THERAPY | Facility: MEDICAL CENTER | Age: 58
End: 2023-02-23

## 2023-02-23 LAB
GLUCOSE BLD-MCNC: 100 MG/DL (ref 65–105)
GLUCOSE BLD-MCNC: 105 MG/DL (ref 65–105)
GLUCOSE BLD-MCNC: 106 MG/DL (ref 65–105)
GLUCOSE BLD-MCNC: 111 MG/DL (ref 65–105)
GLUCOSE BLD-MCNC: 91 MG/DL (ref 65–105)
GLUCOSE BLD-MCNC: 94 MG/DL (ref 65–105)

## 2023-02-23 PROCEDURE — 94761 N-INVAS EAR/PLS OXIMETRY MLT: CPT

## 2023-02-23 PROCEDURE — 99232 SBSQ HOSP IP/OBS MODERATE 35: CPT | Performed by: INTERNAL MEDICINE

## 2023-02-23 PROCEDURE — 97110 THERAPEUTIC EXERCISES: CPT

## 2023-02-23 PROCEDURE — 6370000000 HC RX 637 (ALT 250 FOR IP): Performed by: INTERNAL MEDICINE

## 2023-02-23 PROCEDURE — 82947 ASSAY GLUCOSE BLOOD QUANT: CPT

## 2023-02-23 PROCEDURE — 6370000000 HC RX 637 (ALT 250 FOR IP): Performed by: CLINICAL NURSE SPECIALIST

## 2023-02-23 PROCEDURE — 6370000000 HC RX 637 (ALT 250 FOR IP): Performed by: FAMILY MEDICINE

## 2023-02-23 PROCEDURE — 6360000002 HC RX W HCPCS: Performed by: INTERNAL MEDICINE

## 2023-02-23 PROCEDURE — 2580000003 HC RX 258: Performed by: INTERNAL MEDICINE

## 2023-02-23 PROCEDURE — 97530 THERAPEUTIC ACTIVITIES: CPT

## 2023-02-23 PROCEDURE — 2060000000 HC ICU INTERMEDIATE R&B

## 2023-02-23 PROCEDURE — 94660 CPAP INITIATION&MGMT: CPT

## 2023-02-23 PROCEDURE — 99232 SBSQ HOSP IP/OBS MODERATE 35: CPT | Performed by: FAMILY MEDICINE

## 2023-02-23 PROCEDURE — 97116 GAIT TRAINING THERAPY: CPT

## 2023-02-23 RX ORDER — INSULIN LISPRO 100 [IU]/ML
0-4 INJECTION, SOLUTION INTRAVENOUS; SUBCUTANEOUS
Status: DISCONTINUED | OUTPATIENT
Start: 2023-02-24 | End: 2023-03-05

## 2023-02-23 RX ORDER — SENNA PLUS 8.6 MG/1
1 TABLET ORAL NIGHTLY
Qty: 30 TABLET | Refills: 0 | Status: SHIPPED | OUTPATIENT
Start: 2023-02-23 | End: 2023-03-25

## 2023-02-23 RX ORDER — MECLIZINE HCL 12.5 MG/1
12.5 TABLET ORAL 3 TIMES DAILY PRN
Qty: 30 TABLET | Refills: 0 | Status: SHIPPED | OUTPATIENT
Start: 2023-02-23 | End: 2023-03-05 | Stop reason: SDUPTHER

## 2023-02-23 RX ORDER — POLYETHYLENE GLYCOL 3350 17 G/17G
17 POWDER, FOR SOLUTION ORAL DAILY
Status: DISCONTINUED | OUTPATIENT
Start: 2023-02-23 | End: 2023-02-24

## 2023-02-23 RX ORDER — DOCUSATE SODIUM 100 MG/1
100 CAPSULE, LIQUID FILLED ORAL 2 TIMES DAILY
Status: DISCONTINUED | OUTPATIENT
Start: 2023-02-23 | End: 2023-03-02

## 2023-02-23 RX ORDER — MECLIZINE HCL 12.5 MG/1
12.5 TABLET ORAL 3 TIMES DAILY PRN
Status: DISCONTINUED | OUTPATIENT
Start: 2023-02-23 | End: 2023-03-06 | Stop reason: HOSPADM

## 2023-02-23 RX ORDER — SENNA PLUS 8.6 MG/1
1 TABLET ORAL NIGHTLY
Status: DISCONTINUED | OUTPATIENT
Start: 2023-02-23 | End: 2023-03-02

## 2023-02-23 RX ADMIN — LORAZEPAM 0.5 MG: 0.5 TABLET ORAL at 15:25

## 2023-02-23 RX ADMIN — LEVETIRACETAM 1000 MG: 500 TABLET, FILM COATED ORAL at 09:06

## 2023-02-23 RX ADMIN — MECLIZINE 12.5 MG: 12.5 TABLET ORAL at 11:29

## 2023-02-23 RX ADMIN — PANTOPRAZOLE SODIUM 40 MG: 40 TABLET, DELAYED RELEASE ORAL at 09:06

## 2023-02-23 RX ADMIN — MECLIZINE 12.5 MG: 12.5 TABLET ORAL at 20:07

## 2023-02-23 RX ADMIN — CIPROFLOXACIN 400 MG: 2 INJECTION, SOLUTION INTRAVENOUS at 18:11

## 2023-02-23 RX ADMIN — OXYCODONE HYDROCHLORIDE AND ACETAMINOPHEN 1 TABLET: 5; 325 TABLET ORAL at 20:05

## 2023-02-23 RX ADMIN — LACOSAMIDE 150 MG: 100 TABLET, FILM COATED ORAL at 20:11

## 2023-02-23 RX ADMIN — LAMOTRIGINE 125 MG: 100 TABLET ORAL at 09:06

## 2023-02-23 RX ADMIN — MORPHINE SULFATE 15 MG: 15 TABLET, FILM COATED, EXTENDED RELEASE ORAL at 20:45

## 2023-02-23 RX ADMIN — LEVETIRACETAM 1000 MG: 500 TABLET, FILM COATED ORAL at 20:08

## 2023-02-23 RX ADMIN — SODIUM CHLORIDE 25 ML: 9 INJECTION, SOLUTION INTRAVENOUS at 18:11

## 2023-02-23 RX ADMIN — OXYCODONE HYDROCHLORIDE AND ACETAMINOPHEN 1 TABLET: 5; 325 TABLET ORAL at 04:51

## 2023-02-23 RX ADMIN — ENOXAPARIN SODIUM 40 MG: 100 INJECTION SUBCUTANEOUS at 09:05

## 2023-02-23 RX ADMIN — POLYETHYLENE GLYCOL 3350 17 G: 17 POWDER, FOR SOLUTION ORAL at 11:31

## 2023-02-23 RX ADMIN — LORAZEPAM 0.5 MG: 0.5 TABLET ORAL at 06:42

## 2023-02-23 RX ADMIN — CIPROFLOXACIN 400 MG: 2 INJECTION, SOLUTION INTRAVENOUS at 04:49

## 2023-02-23 RX ADMIN — LAMOTRIGINE 125 MG: 100 TABLET ORAL at 20:09

## 2023-02-23 RX ADMIN — LACOSAMIDE 150 MG: 100 TABLET, FILM COATED ORAL at 09:06

## 2023-02-23 RX ADMIN — OXYCODONE HYDROCHLORIDE AND ACETAMINOPHEN 1 TABLET: 5; 325 TABLET ORAL at 08:50

## 2023-02-23 RX ADMIN — DOCUSATE SODIUM 100 MG: 100 CAPSULE ORAL at 20:07

## 2023-02-23 RX ADMIN — DOCUSATE SODIUM 100 MG: 100 CAPSULE ORAL at 11:31

## 2023-02-23 RX ADMIN — SENNOSIDES 8.6 MG: 8.6 TABLET, COATED ORAL at 20:12

## 2023-02-23 RX ADMIN — OXYCODONE HYDROCHLORIDE AND ACETAMINOPHEN 1 TABLET: 5; 325 TABLET ORAL at 14:27

## 2023-02-23 RX ADMIN — MORPHINE SULFATE 15 MG: 15 TABLET, FILM COATED, EXTENDED RELEASE ORAL at 11:29

## 2023-02-23 RX ADMIN — SODIUM CHLORIDE, PRESERVATIVE FREE 10 ML: 5 INJECTION INTRAVENOUS at 20:13

## 2023-02-23 ASSESSMENT — ENCOUNTER SYMPTOMS
RHINORRHEA: 0
ABDOMINAL PAIN: 0
BLOOD IN STOOL: 0
NAUSEA: 0
SHORTNESS OF BREATH: 0
VOMITING: 0
DIARRHEA: 0
CONSTIPATION: 0
WHEEZING: 0
COUGH: 0
CHEST TIGHTNESS: 0

## 2023-02-23 ASSESSMENT — PAIN DESCRIPTION - LOCATION
LOCATION: ABDOMEN

## 2023-02-23 ASSESSMENT — PAIN SCALES - GENERAL
PAINLEVEL_OUTOF10: 7
PAINLEVEL_OUTOF10: 8
PAINLEVEL_OUTOF10: 4
PAINLEVEL_OUTOF10: 8
PAINLEVEL_OUTOF10: 5
PAINLEVEL_OUTOF10: 8
PAINLEVEL_OUTOF10: 7
PAINLEVEL_OUTOF10: 8
PAINLEVEL_OUTOF10: 8
PAINLEVEL_OUTOF10: 7

## 2023-02-23 NOTE — CONSULTS
VAT consult note\"  Consulted for piv placement. Patient has had multiple piv failures since admission. Writer places piv left upper arm. Dr Monique Encinas approves port access if IV Therapy needs continue and current piv fails. Staff Rn aware port is able to be accessed. Please call VAT for port access if needed.

## 2023-02-23 NOTE — CARE COORDINATION
Transitional planning-message from nurse Tanya Ramírez that patient doesn't want to go to [facility] with patient-she does not want to go to [facility]. 0730 called Daughter Ori Alvarenga confirmed no to [employer]. She gave me 3 choices. #1 [facility] #2 [address] #3 [employer]. [facility] is last choice    [phone] called [facility] and left message with [place] regarding patient and family not wanting to go to [employer] at this time. 0740 referral faxed to [facility]-talked with Knute Sport. Referral faxed to [address] [place] [address] message with Armando Khan. Referral faxed to [facility] message with Nyasia Pinedo. Call from Baxter at [facility]    [phone] message from [place] at [address] [facility] called [address] and left message for Jeanmarie crane to call back. [phone] call from [place] at [address]. She states they tried to run [employer] and they say she hasn't paid her monthly premium at this time. Possible disenrollment. 46 talked with daughter Braxton Menon regarding the insurance situation for her mother. She said she would look into it. 1510 call from daughter Braxton Menon- she called the insurance company and took care of the bill. Called and left message with Jeanmarie crane at [address]

## 2023-02-23 NOTE — PROGRESS NOTES
[facility]  Progress Note                   Date:   2/23/2023  Patient name: Enrico Finley [facility]  Date of admission:  2/19/2023  5:59 PM  MRN:   [id number]  YOB: 1965  PCP: Gómez Avery DO    Reason for Admission: Seizure ([address] [R56.9]  Altered mental status, unspecified altered mental status type [R41.82]    Subjective:       Clinical Changes /Abnormalities:Patient seen and examined. Denies chest pain , or SOB. Tele/vitals/labs reviewed. Remains SR. Review of Systems    Medications:   Scheduled Meds:   pantoprazole  40 mg Oral QAM AC    LORazepam  0.5 mg IntraVENous Once    sodium chloride flush  5-40 mL IntraVENous 2 times per day    enoxaparin  40 mg SubCUTAneous Daily    insulin lispro  0-4 Units SubCUTAneous Q4H    lamoTRIgine  125 mg Oral BID    lacosamide  150 mg Oral BID    levETIRAcetam  1,000 mg Oral BID    potassium chloride  40 mEq Oral Once    morphine  15 mg Oral BID    ciprofloxacin  400 mg IntraVENous Q12H     Continuous Infusions:   dextrose      sodium chloride 25 mL (02/22/23 1808)     CBC:   Recent Labs     02/20/23 1749 02/21/23 0225   WBC  --  9.6   HGB 11.3* 11.8*   PLT  --  349       BMP:    Recent Labs     02/20/23 1749 02/20/23 1955 02/21/23 0225     --  136   K 3.7 3.5* 3.6*     --  104   CO2 23  --  22   BUN  --   --  6   CREATININE  --   --  0.28*   GLUCOSE  --   --  113*       Hepatic:  No results for input(s): AST, ALT, ALB, BILITOT, ALKPHOS in the last 72 hours. Troponin:   Recent Labs     02/21/23  1628   TROPHS 100*       BNP: No results for input(s): BNP in the last 72 hours. Lipids: No results for input(s): CHOL, HDL in the last 72 hours. Invalid input(s): LDLCALCU  INR:   No results for input(s): INR in the last 72 hours.     EKG:    Date: 02/20/23  Reading:   Sinus tachycardia with occasional , and consecutive Premature ventricular complexes and Fusion complexes  Indeterminate axis  Septal infarct (cited on or before 10-OCT-2022)  Abnormal ECG  When compared with ECG of 10-OCT-2022 05:05,  Premature ventricular complexes are now Present  QRS axis Shifted right  ST now depressed in Anterior leads  T wave inversion no longer evident in Inferior leads  Nonspecific T wave abnormality now evident in Lateral leads     LAST ECHO:  Date: 2/22  Findings Summary:  Limited study  Global left ventricular systolic function is normal.  Estimated ejection fraction is 55 % . Small to moderate pericardial effusion seen mostly posteriorly, somewhat  improved from last study in 06/2021. No echo signs of tamponade. LAST Stress Test:   NA     LAST Cardiac Angiography:.  Date: 1/21  Findings:  Procedure Summary  Single vessel CAD (Small PDA)  Lower normal LV function     Objective:   Vitals: BP (!) 116/57   Pulse 93   Temp 97.9 °F (36.6 °C) (Oral)   Resp 20   Wt 137 lb 5.6 oz (62.3 kg)   SpO2 99%   BMI 22.86 kg/m²   General appearance: alert and cooperative with exam  HEENT: Head: Normocephalic, no lesions, without obvious abnormality.   Neck:no JVD, trachea midline, no adenopathy  Lungs: very dim to auscultation throughout, RA without distress  Heart: Regular rate and rhythm, s1/s2 auscultated, no murmurs, SR   Abdomen: soft, non-tender, bowel sounds active  Extremities: generalized +1 edema  Neurologic: not done        Assessment / Acute Cardiac Problems:   Seizure disorder  HS trop elevation Trend has been 31>31>40>295>100  Hematuria  Type 2 diabetes  History of ovarian cancer with mets   Hypokalemia  Had pericardial effusion seen on limited echo 1 year ago   Status post recent vascular embolization       Patient Active Problem List:     Epithelial ovarian cancer, FIGO stage OSIRIS (HCC)     Goals of care, counseling/discussion     Anemia     Splenic abscess     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)     Acute encephalopathy     PRES (posterior reversible encephalopathy syndrome)     Hemianopia, bitemporal     Encephalopathy Seizure disorder, status epilepticus, nonconvulsive ([address]     History of ovarian cancer     Pleural effusion     Bandemia     Cancer, metastatic to bone (HCC)     Intractable low back pain     Fatigue     Chronic deep vein thrombosis (DVT) of femoral vein of left lower extremity (HCC)     Iron deficiency anemia secondary to inadequate dietary iron intake     Iron malabsorption     Gynecologic cancer (HCC)     Thrombocytosis     History of deep venous thrombosis (DVT) of distal vein of left lower extremity: On Eliquis     Pelvic mass     Acute on chronic anemia     AMS (altered mental status)     Lactic acidosis     Anxiety     Diabetes mellitus (HCC)     Gastroesophageal reflux disease     Recurrent major depressive disorder, in partial remission (HCC)     Ileus (HCC)     Pericardial effusion     Hypokalemia     Metabolic encephalopathy     Delirium due to another medical condition     Urinary tract infection without hematuria     Seizure disorder (HCC)     Leg swelling     Iron deficiency     Drug-induced constipation     Septicemia (HCC)     Hallucinations     Cerebrovascular accident (CVA) ([address]     Difficulty with speech     MDD (major depressive disorder), recurrent severe, without psychosis ([address]     Seizure ([address]     Somnolence     Calculus of gallbladder without cholecystitis without obstruction     Intestinal adhesions with partial obstruction (HCC)     Traumatic rhabdomyolysis ([address]      Plan of Treatment:   Stable. Denies any chest pain. No acute ECG changes. Trop elevation likely demand ischemia secondary to multiple co-morbidities. Awaiting echo to assess LVEF & WMA - completed yesterday and pending read  metastatic ovarian cancer followed by oncology - may not be candidate for any further CA treatments. F/U as OP  Cholelithiasis, asymptomatic- Gen surg s/o and said not surgical candidate,  percut choley tube if becomes symptomatic.     Long discussion with patient regarding elevated troponin in addition to multiple co-morbidities. Questions/concerns addressed. She is not interested in any invasive treatments at at this time from a CV standpoint.  Will continue med management and f/u in clinic as OP    Electronically signed by ESTRELLITA Gill CNP on 2/23/2023 at 10:44 AM  [address]  [phone]

## 2023-02-23 NOTE — PROGRESS NOTES
PALLIATIVE CARE NURSING ASSESSMENT    Patient: Eliazar Jacobsen  Room: 3229/6693-38    Reason For Consult   Goals of care evaluation  Distress management  Guidance and support  Facilitate communications  Assistance in coordinating care    Code Status: Full code    Summary:  Palliative care visit to room with patient. Support, assistance and active listening provided. Pt relates dizziness and abdominal discomfort continues, vs stable, stools are with less frequency and more formed. Trying to reach Samuel for preference of outpatient palliative care provider. Left my contact number with nurse. If Samuel comes in I am happy to come back and talk to her.  has sent out referrals for SNF. Plan is to go to SNF for rehab. Palliative Care will continue to follow. Impression: Eliazar Jacobsen is a 59y.o. year old female  has a past medical history of Anemia, Bleeding, Calculus of gallbladder without cholecystitis without obstruction, Cervical cancer ([address] Depression, Diabetes mellitus ([address] GERD (gastroesophageal reflux disease), Hx of blood clots, Hypertension, Intestinal adhesions with partial obstruction ([address] Metastatic cancer ([address] Ovarian cancer ([address] Post chemo evaluation, PRES (posterior reversible encephalopathy syndrome), Somnolence, Splenic lesion, and Traumatic rhabdomyolysis ([address] .  Currently hospitalized for the management of seizure. The Palliative Care Team is following to assist with patient and family support and goals of care. Vital Signs  Blood pressure 117/66, pulse 89, temperature 97.7 °F (36.5 °C), temperature source Oral, resp. rate 21, weight 137 lb 5.6 oz (62.3 kg), SpO2 97 %.     Patient Active Problem List   Diagnosis    Epithelial ovarian cancer, FIGO stage OSIRIS ([address]    Goals of care, counseling/discussion    Anemia    Splenic abscess    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 ([address]    Acute encephalopathy    PRES (posterior reversible encephalopathy syndrome)    Hemianopia, bitemporal    Encephalopathy    Seizure disorder, status epilepticus, nonconvulsive (HCC)    History of ovarian cancer    Pleural effusion    Bandemia    Cancer, metastatic to bone (HCC)    Intractable low back pain    Fatigue    Chronic deep vein thrombosis (DVT) of femoral vein of left lower extremity (HCC)    Iron deficiency anemia secondary to inadequate dietary iron intake    Iron malabsorption    Gynecologic cancer (HCC)    Thrombocytosis    History of deep venous thrombosis (DVT) of distal vein of left lower extremity: On Eliquis    Pelvic mass    Acute on chronic anemia    AMS (altered mental status)    Lactic acidosis    Anxiety    Diabetes mellitus (HCC)    Gastroesophageal reflux disease    Recurrent major depressive disorder, in partial remission (HCC)    Ileus (HCC)    Pericardial effusion    Hypokalemia    Acute respiratory failure with hypoxia (HCC)    Metabolic encephalopathy    Delirium due to another medical condition    Urinary tract infection without hematuria    Seizure disorder (HCC)    Leg swelling    Iron deficiency    Drug-induced constipation    Septicemia (HCC)    Hallucinations    Cerebrovascular accident (CVA) ([address]    Difficulty with speech    MDD (major depressive disorder), recurrent severe, without psychosis ([address]    Seizure ([address]    Somnolence    Calculus of gallbladder without cholecystitis without obstruction    Intestinal adhesions with partial obstruction ([address]    Traumatic rhabdomyolysis ([address]       Palliative Interaction:Ongoing follow up with patient from palliative care. Reviewed medical record, updates received from bedside nurse Judi Larios. Went to visit patient at bedside. Sat with her and we talked about how she is feeling, her previous stay at a care facility. We talked about palliative care as a resource for her moving forward. I will try to reach Andrea today to see if she wants referral to outpatient palliative care and which one. Palliative Care will continue to follow.      Goals/Plan of care  Education/support to staff  Education/support to family  Education/support to patient  Communications with primary service  Providing support for coping/adaptation/distress of family  Providing support for coping/adaptation/distress of patient  Discussing meaning/purpose   Continue with current plan of care  Code status clarified: Full Code  Validating patient/family distress  Continued communication updates  Recognizing, reflecting, and empathizing with family members' anticipatory grief  Recognizing, reflecting, and empathizing with the patient's anticipatory grief      Palliative Care Coordinator  Je ACOSTA, RN, ONN-CG    [facility] Office: [address] [facility] Office: [phone]  [facility] Office: [phone]    For Symptom Management Clinic scheduling please call [phone]
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
## 2023-02-23 NOTE — PLAN OF CARE
Problem: Discharge Planning  Goal: Discharge to home or other facility with appropriate resources  Outcome: Progressing     Problem: Pain  Goal: Verbalizes/displays adequate comfort level or baseline comfort level  Outcome: Progressing     Problem: Skin/Tissue Integrity  Goal: Absence of new skin breakdown  Description: 1. Monitor for areas of redness and/or skin breakdown  2. Assess vascular access sites hourly  3. Every 4-6 hours minimum:  Change oxygen saturation probe site  4. Every 4-6 hours:  If on nasal continuous positive airway pressure, respiratory therapy assess nares and determine need for appliance change or resting period.   Outcome: Progressing     Problem: Safety - Adult  Goal: Free from fall injury  Outcome: Progressing     Problem: Respiratory - Adult  Goal: Achieves optimal ventilation and oxygenation  Outcome: Progressing     Problem: Chronic Conditions and Co-morbidities  Goal: Patient's chronic conditions and co-morbidity symptoms are monitored and maintained or improved  Outcome: Progressing
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
## 2023-02-24 LAB
ANION GAP SERPL CALCULATED.3IONS-SCNC: 10 MMOL/L (ref 9–17)
BUN SERPL-MCNC: 8 MG/DL (ref 8–23)
CALCIUM SERPL-MCNC: 7.9 MG/DL (ref 8.6–10.4)
CHLORIDE SERPL-SCNC: 104 MMOL/L (ref 98–107)
CO2 SERPL-SCNC: 25 MMOL/L (ref 20–31)
CREAT SERPL-MCNC: 0.46 MG/DL (ref 0.5–0.9)
GFR SERPL CREATININE-BSD FRML MDRD: >60 ML/MIN/1.73M2
GLUCOSE BLD-MCNC: 108 MG/DL (ref 65–105)
GLUCOSE BLD-MCNC: 178 MG/DL (ref 65–105)
GLUCOSE BLD-MCNC: 95 MG/DL (ref 65–105)
GLUCOSE BLD-MCNC: 99 MG/DL (ref 65–105)
GLUCOSE SERPL-MCNC: 139 MG/DL (ref 70–99)
MAGNESIUM SERPL-MCNC: 1.6 MG/DL (ref 1.6–2.6)
POTASSIUM SERPL-SCNC: 3 MMOL/L (ref 3.7–5.3)
SODIUM SERPL-SCNC: 139 MMOL/L (ref 135–144)

## 2023-02-24 PROCEDURE — 6360000002 HC RX W HCPCS: Performed by: INTERNAL MEDICINE

## 2023-02-24 PROCEDURE — 6370000000 HC RX 637 (ALT 250 FOR IP): Performed by: CLINICAL NURSE SPECIALIST

## 2023-02-24 PROCEDURE — 6370000000 HC RX 637 (ALT 250 FOR IP): Performed by: INTERNAL MEDICINE

## 2023-02-24 PROCEDURE — 80048 BASIC METABOLIC PNL TOTAL CA: CPT

## 2023-02-24 PROCEDURE — 36415 COLL VENOUS BLD VENIPUNCTURE: CPT

## 2023-02-24 PROCEDURE — 6370000000 HC RX 637 (ALT 250 FOR IP): Performed by: FAMILY MEDICINE

## 2023-02-24 PROCEDURE — 6370000000 HC RX 637 (ALT 250 FOR IP): Performed by: NURSE PRACTITIONER

## 2023-02-24 PROCEDURE — 99232 SBSQ HOSP IP/OBS MODERATE 35: CPT | Performed by: INTERNAL MEDICINE

## 2023-02-24 PROCEDURE — 2060000000 HC ICU INTERMEDIATE R&B

## 2023-02-24 PROCEDURE — 82947 ASSAY GLUCOSE BLOOD QUANT: CPT

## 2023-02-24 PROCEDURE — 99232 SBSQ HOSP IP/OBS MODERATE 35: CPT | Performed by: FAMILY MEDICINE

## 2023-02-24 PROCEDURE — 2580000003 HC RX 258: Performed by: INTERNAL MEDICINE

## 2023-02-24 PROCEDURE — 83735 ASSAY OF MAGNESIUM: CPT

## 2023-02-24 RX ORDER — POTASSIUM CHLORIDE 20 MEQ/1
60 TABLET, EXTENDED RELEASE ORAL ONCE
Status: COMPLETED | OUTPATIENT
Start: 2023-02-24 | End: 2023-02-24

## 2023-02-24 RX ORDER — MORPHINE SULFATE 30 MG/1
30 TABLET, FILM COATED, EXTENDED RELEASE ORAL ONCE
Status: COMPLETED | OUTPATIENT
Start: 2023-02-24 | End: 2023-02-24

## 2023-02-24 RX ORDER — POTASSIUM CHLORIDE 20 MEQ/1
20 TABLET, EXTENDED RELEASE ORAL
Status: DISCONTINUED | OUTPATIENT
Start: 2023-02-25 | End: 2023-02-25

## 2023-02-24 RX ORDER — POTASSIUM CHLORIDE 20 MEQ/1
60 TABLET, EXTENDED RELEASE ORAL
Status: DISCONTINUED | OUTPATIENT
Start: 2023-02-25 | End: 2023-02-24

## 2023-02-24 RX ORDER — POTASSIUM CHLORIDE 20 MEQ/1
40 TABLET, EXTENDED RELEASE ORAL
Status: DISCONTINUED | OUTPATIENT
Start: 2023-02-25 | End: 2023-02-25

## 2023-02-24 RX ADMIN — LEVETIRACETAM 1000 MG: 500 TABLET, FILM COATED ORAL at 09:14

## 2023-02-24 RX ADMIN — OXYCODONE HYDROCHLORIDE AND ACETAMINOPHEN 1 TABLET: 5; 325 TABLET ORAL at 18:05

## 2023-02-24 RX ADMIN — LAMOTRIGINE 125 MG: 100 TABLET ORAL at 09:15

## 2023-02-24 RX ADMIN — SODIUM CHLORIDE, PRESERVATIVE FREE 10 ML: 5 INJECTION INTRAVENOUS at 20:04

## 2023-02-24 RX ADMIN — LAMOTRIGINE 125 MG: 100 TABLET ORAL at 20:03

## 2023-02-24 RX ADMIN — MECLIZINE 12.5 MG: 12.5 TABLET ORAL at 04:51

## 2023-02-24 RX ADMIN — MORPHINE SULFATE 30 MG: 30 TABLET, FILM COATED, EXTENDED RELEASE ORAL at 20:54

## 2023-02-24 RX ADMIN — MAGNESIUM SULFATE HEPTAHYDRATE 1000 MG: 1 INJECTION, SOLUTION INTRAVENOUS at 13:57

## 2023-02-24 RX ADMIN — ENOXAPARIN SODIUM 40 MG: 100 INJECTION SUBCUTANEOUS at 09:16

## 2023-02-24 RX ADMIN — LACOSAMIDE 150 MG: 100 TABLET, FILM COATED ORAL at 09:15

## 2023-02-24 RX ADMIN — OXYCODONE HYDROCHLORIDE AND ACETAMINOPHEN 1 TABLET: 5; 325 TABLET ORAL at 04:50

## 2023-02-24 RX ADMIN — SODIUM CHLORIDE, PRESERVATIVE FREE 10 ML: 5 INJECTION INTRAVENOUS at 09:16

## 2023-02-24 RX ADMIN — MAGNESIUM SULFATE HEPTAHYDRATE 1000 MG: 1 INJECTION, SOLUTION INTRAVENOUS at 12:44

## 2023-02-24 RX ADMIN — MORPHINE SULFATE 15 MG: 15 TABLET, FILM COATED, EXTENDED RELEASE ORAL at 20:02

## 2023-02-24 RX ADMIN — LORAZEPAM 0.5 MG: 0.5 TABLET ORAL at 20:03

## 2023-02-24 RX ADMIN — LACOSAMIDE 150 MG: 100 TABLET, FILM COATED ORAL at 20:02

## 2023-02-24 RX ADMIN — CIPROFLOXACIN 400 MG: 2 INJECTION, SOLUTION INTRAVENOUS at 17:56

## 2023-02-24 RX ADMIN — LORAZEPAM 0.5 MG: 0.5 TABLET ORAL at 02:00

## 2023-02-24 RX ADMIN — PANTOPRAZOLE SODIUM 40 MG: 40 TABLET, DELAYED RELEASE ORAL at 09:15

## 2023-02-24 RX ADMIN — MORPHINE SULFATE 15 MG: 15 TABLET, FILM COATED, EXTENDED RELEASE ORAL at 10:30

## 2023-02-24 RX ADMIN — CIPROFLOXACIN 400 MG: 2 INJECTION, SOLUTION INTRAVENOUS at 05:10

## 2023-02-24 RX ADMIN — LORAZEPAM 0.5 MG: 0.5 TABLET ORAL at 12:47

## 2023-02-24 RX ADMIN — POTASSIUM CHLORIDE 60 MEQ: 1500 TABLET, EXTENDED RELEASE ORAL at 15:02

## 2023-02-24 RX ADMIN — LEVETIRACETAM 1000 MG: 500 TABLET, FILM COATED ORAL at 20:03

## 2023-02-24 RX ADMIN — OXYCODONE HYDROCHLORIDE AND ACETAMINOPHEN 1 TABLET: 5; 325 TABLET ORAL at 00:12

## 2023-02-24 RX ADMIN — MECLIZINE 12.5 MG: 12.5 TABLET ORAL at 16:10

## 2023-02-24 ASSESSMENT — ENCOUNTER SYMPTOMS
COUGH: 0
CHEST TIGHTNESS: 0
CONSTIPATION: 0
BLOOD IN STOOL: 0
ABDOMINAL PAIN: 0
WHEEZING: 0
VOMITING: 0
DIARRHEA: 0
SHORTNESS OF BREATH: 0
RHINORRHEA: 0
NAUSEA: 0

## 2023-02-24 ASSESSMENT — PAIN SCALES - GENERAL
PAINLEVEL_OUTOF10: 8
PAINLEVEL_OUTOF10: 6
PAINLEVEL_OUTOF10: 7
PAINLEVEL_OUTOF10: 8
PAINLEVEL_OUTOF10: 7
PAINLEVEL_OUTOF10: 7
PAINLEVEL_OUTOF10: 3

## 2023-02-24 ASSESSMENT — PAIN DESCRIPTION - LOCATION
LOCATION: ABDOMEN

## 2023-02-24 ASSESSMENT — PAIN DESCRIPTION - ORIENTATION: ORIENTATION: LOWER

## 2023-02-24 ASSESSMENT — PAIN DESCRIPTION - DESCRIPTORS: DESCRIPTORS: CRAMPING

## 2023-02-24 NOTE — CARE COORDINATION
Transitional planning-called [facility] and talked with Christina Ayala claims still denied by insurance(daughter said she paid the premium yesterday)    [facility] called [facility] and talked with Zulema-needs referral hand faxed-no access to [facility]    Hand faxed referral to [facility].
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
## 2023-02-24 NOTE — PROGRESS NOTES
SPIRITUAL CARE DEPARTMENT - [address] [address]  PROGRESS NOTE    Shift date: 2/24/2023   Shift day: Friday   Shift # 1    Room # 6606/7504-63   Name: Jet Lazcano                Scientology: [id number] of [employer]:     Referral: Routine Visit, palliative care patient    Admit Date & Time: 2/19/2023  5:59 PM    Assessment:  Jte Lazcano is a 59 y.o. female in the hospital because of a \"seizure\".  knows patient from previous encounters. Upon entering the room writer observes patient awake and alert. Patient said she still does not feel well, \"worse than when I came in\". She became tearful when she said she expects to have to go to a nursing home for rehab. Patient admitted to feeling overwhelmed. When asked if her bernardo is helping her, she said only sometimes and admitted to questioning God. Patient said local daughter, Sharad Phipps, is supportive \"sometimes\". \"She comes to see me but doesn't stay long. \"  She became tearful when she said this. Pt said she expects daughter, Vilma Troncoso, to visit from [facility] but does not know when. Intervention:  Writer introduced self and title as  Writer offered space for patient  to express feelings, needs, and concerns and provided a ministry presence.  assured her of continued prayers and support from spiritual care. Outcome:  Patient was receptive to visit and expressed appreciation. Plan:  Chaplains will remain available to offer spiritual and emotional support as needed.       Electronically signed by Sonido Hayes on 2/24/2023 at [address]  [phone]       02/24/23 1143   Encounter Summary   Service Provided For: Patient   Referral/Consult From: Other    Support System Children   Last Encounter  02/24/23   Complexity of Encounter Moderate   Begin Time 1116   End Time  1135   Total Time Calculated 19 min   Palliative Care   Type Palliative Care, Initial/Spiritual Assessment   Assessment/Intervention/Outcome   Assessment Coping;Tearful;Impaired resilience; Sad   Intervention Active listening;Discussed illness injury and its impact; Discussed relationship with God;Explored/Affirmed feelings, thoughts, concerns;Prayer (assurance of)/[place]   Outcome Engaged in conversation;Expressed feelings, needs, and concerns;Receptive
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
## 2023-02-24 NOTE — PLAN OF CARE
Problem: Discharge Planning  Goal: Discharge to home or other facility with appropriate resources  Outcome: Progressing     Problem: Pain  Goal: Verbalizes/displays adequate comfort level or baseline comfort level  Outcome: Progressing     Problem: Skin/Tissue Integrity  Goal: Absence of new skin breakdown  Description: 1. Monitor for areas of redness and/or skin breakdown  2. Assess vascular access sites hourly  3. Every 4-6 hours minimum:  Change oxygen saturation probe site  4. Every 4-6 hours:  If on nasal continuous positive airway pressure, respiratory therapy assess nares and determine need for appliance change or resting period.   Outcome: Progressing     Problem: Safety - Adult  Goal: Free from fall injury  Outcome: Progressing     Problem: Respiratory - Adult  Goal: Achieves optimal ventilation and oxygenation  Outcome: Progressing     Problem: Chronic Conditions and Co-morbidities  Goal: Patient's chronic conditions and co-morbidity symptoms are monitored and maintained or improved  Outcome: Progressing     Problem: ABCDS Injury Assessment  Goal: Absence of physical injury  Outcome: Progressing   Electronically signed by Juan Manuel Suarez RN on 2/24/2023 at 4:15 PM
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
## 2023-02-25 LAB
ANION GAP SERPL CALCULATED.3IONS-SCNC: 8 MMOL/L (ref 9–17)
BUN SERPL-MCNC: 7 MG/DL (ref 8–23)
CALCIUM SERPL-MCNC: 8.3 MG/DL (ref 8.6–10.4)
CHLORIDE SERPL-SCNC: 102 MMOL/L (ref 98–107)
CO2 SERPL-SCNC: 25 MMOL/L (ref 20–31)
CREAT SERPL-MCNC: 0.49 MG/DL (ref 0.5–0.9)
GFR SERPL CREATININE-BSD FRML MDRD: >60 ML/MIN/1.73M2
GLUCOSE BLD-MCNC: 110 MG/DL (ref 65–105)
GLUCOSE BLD-MCNC: 118 MG/DL (ref 65–105)
GLUCOSE BLD-MCNC: 122 MG/DL (ref 65–105)
GLUCOSE BLD-MCNC: 163 MG/DL (ref 65–105)
GLUCOSE SERPL-MCNC: 124 MG/DL (ref 70–99)
POTASSIUM SERPL-SCNC: 4.2 MMOL/L (ref 3.7–5.3)
SODIUM SERPL-SCNC: 135 MMOL/L (ref 135–144)

## 2023-02-25 PROCEDURE — 2060000000 HC ICU INTERMEDIATE R&B

## 2023-02-25 PROCEDURE — 82947 ASSAY GLUCOSE BLOOD QUANT: CPT

## 2023-02-25 PROCEDURE — 6370000000 HC RX 637 (ALT 250 FOR IP): Performed by: INTERNAL MEDICINE

## 2023-02-25 PROCEDURE — 36415 COLL VENOUS BLD VENIPUNCTURE: CPT

## 2023-02-25 PROCEDURE — 6360000002 HC RX W HCPCS: Performed by: INTERNAL MEDICINE

## 2023-02-25 PROCEDURE — 80048 BASIC METABOLIC PNL TOTAL CA: CPT

## 2023-02-25 PROCEDURE — 2580000003 HC RX 258: Performed by: INTERNAL MEDICINE

## 2023-02-25 PROCEDURE — 94660 CPAP INITIATION&MGMT: CPT

## 2023-02-25 PROCEDURE — 6370000000 HC RX 637 (ALT 250 FOR IP): Performed by: FAMILY MEDICINE

## 2023-02-25 PROCEDURE — 99232 SBSQ HOSP IP/OBS MODERATE 35: CPT | Performed by: FAMILY MEDICINE

## 2023-02-25 PROCEDURE — 97535 SELF CARE MNGMENT TRAINING: CPT

## 2023-02-25 PROCEDURE — 6370000000 HC RX 637 (ALT 250 FOR IP): Performed by: CLINICAL NURSE SPECIALIST

## 2023-02-25 RX ORDER — LORAZEPAM 1 MG/1
1 TABLET ORAL EVERY 8 HOURS PRN
Status: DISCONTINUED | OUTPATIENT
Start: 2023-02-25 | End: 2023-03-06 | Stop reason: HOSPADM

## 2023-02-25 RX ORDER — POTASSIUM CHLORIDE 20 MEQ/1
10 TABLET, EXTENDED RELEASE ORAL 2 TIMES DAILY WITH MEALS
Status: DISCONTINUED | OUTPATIENT
Start: 2023-02-25 | End: 2023-03-06 | Stop reason: HOSPADM

## 2023-02-25 RX ORDER — OXYCODONE HYDROCHLORIDE AND ACETAMINOPHEN 5; 325 MG/1; MG/1
1 TABLET ORAL EVERY 4 HOURS PRN
Qty: 9 TABLET | Refills: 0 | Status: SHIPPED | OUTPATIENT
Start: 2023-02-25 | End: 2023-03-05 | Stop reason: SDUPTHER

## 2023-02-25 RX ORDER — PANTOPRAZOLE SODIUM 40 MG/1
40 TABLET, DELAYED RELEASE ORAL DAILY
Status: DISCONTINUED | OUTPATIENT
Start: 2023-02-25 | End: 2023-03-06 | Stop reason: HOSPADM

## 2023-02-25 RX ORDER — MORPHINE SULFATE 30 MG/1
30 TABLET, FILM COATED, EXTENDED RELEASE ORAL 2 TIMES DAILY
Qty: 6 TABLET | Refills: 0 | Status: SHIPPED | OUTPATIENT
Start: 2023-02-25 | End: 2023-02-28

## 2023-02-25 RX ORDER — MORPHINE SULFATE 15 MG/1
15 TABLET, FILM COATED, EXTENDED RELEASE ORAL 2 TIMES DAILY
Qty: 6 TABLET | Refills: 0 | Status: SHIPPED | OUTPATIENT
Start: 2023-02-25 | End: 2023-02-28

## 2023-02-25 RX ORDER — MORPHINE SULFATE 30 MG/1
30 TABLET, FILM COATED, EXTENDED RELEASE ORAL EVERY 12 HOURS SCHEDULED
Status: DISCONTINUED | OUTPATIENT
Start: 2023-02-25 | End: 2023-03-06 | Stop reason: HOSPADM

## 2023-02-25 RX ORDER — CHOLECALCIFEROL (VITAMIN D3) 125 MCG
5 CAPSULE ORAL NIGHTLY
Status: DISCONTINUED | OUTPATIENT
Start: 2023-02-25 | End: 2023-03-06 | Stop reason: HOSPADM

## 2023-02-25 RX ORDER — LORAZEPAM 1 MG/1
1 TABLET ORAL EVERY 8 HOURS PRN
Qty: 6 TABLET | Refills: 0 | Status: SHIPPED | OUTPATIENT
Start: 2023-02-25 | End: 2023-03-05 | Stop reason: SDUPTHER

## 2023-02-25 RX ADMIN — PANTOPRAZOLE SODIUM 40 MG: 40 TABLET, DELAYED RELEASE ORAL at 09:07

## 2023-02-25 RX ADMIN — LORAZEPAM 1 MG: 1 TABLET ORAL at 21:05

## 2023-02-25 RX ADMIN — SODIUM CHLORIDE, PRESERVATIVE FREE 10 ML: 5 INJECTION INTRAVENOUS at 09:00

## 2023-02-25 RX ADMIN — LAMOTRIGINE 125 MG: 100 TABLET ORAL at 20:47

## 2023-02-25 RX ADMIN — MORPHINE SULFATE 30 MG: 30 TABLET, FILM COATED, EXTENDED RELEASE ORAL at 20:49

## 2023-02-25 RX ADMIN — SODIUM CHLORIDE, PRESERVATIVE FREE 10 ML: 5 INJECTION INTRAVENOUS at 20:52

## 2023-02-25 RX ADMIN — MECLIZINE 12.5 MG: 12.5 TABLET ORAL at 13:01

## 2023-02-25 RX ADMIN — OXYCODONE HYDROCHLORIDE AND ACETAMINOPHEN 1 TABLET: 5; 325 TABLET ORAL at 15:56

## 2023-02-25 RX ADMIN — MORPHINE SULFATE 15 MG: 15 TABLET, FILM COATED, EXTENDED RELEASE ORAL at 20:47

## 2023-02-25 RX ADMIN — OXYCODONE HYDROCHLORIDE AND ACETAMINOPHEN 1 TABLET: 5; 325 TABLET ORAL at 11:36

## 2023-02-25 RX ADMIN — LORAZEPAM 1 MG: 1 TABLET ORAL at 11:41

## 2023-02-25 RX ADMIN — LAMOTRIGINE 125 MG: 100 TABLET ORAL at 09:00

## 2023-02-25 RX ADMIN — CIPROFLOXACIN 400 MG: 2 INJECTION, SOLUTION INTRAVENOUS at 05:21

## 2023-02-25 RX ADMIN — Medication 5 MG: at 20:49

## 2023-02-25 RX ADMIN — LORAZEPAM 0.5 MG: 0.5 TABLET ORAL at 04:11

## 2023-02-25 RX ADMIN — LEVETIRACETAM 1000 MG: 500 TABLET, FILM COATED ORAL at 20:47

## 2023-02-25 RX ADMIN — ENOXAPARIN SODIUM 40 MG: 100 INJECTION SUBCUTANEOUS at 09:00

## 2023-02-25 RX ADMIN — LACOSAMIDE 150 MG: 100 TABLET, FILM COATED ORAL at 20:47

## 2023-02-25 RX ADMIN — DOCUSATE SODIUM 100 MG: 100 CAPSULE ORAL at 08:59

## 2023-02-25 RX ADMIN — OXYCODONE HYDROCHLORIDE AND ACETAMINOPHEN 1 TABLET: 5; 325 TABLET ORAL at 05:18

## 2023-02-25 RX ADMIN — OXYCODONE HYDROCHLORIDE AND ACETAMINOPHEN 1 TABLET: 5; 325 TABLET ORAL at 00:21

## 2023-02-25 RX ADMIN — POTASSIUM CHLORIDE 10 MEQ: 1500 TABLET, EXTENDED RELEASE ORAL at 17:17

## 2023-02-25 RX ADMIN — SERTRALINE 100 MG: 50 TABLET, FILM COATED ORAL at 11:37

## 2023-02-25 RX ADMIN — LEVETIRACETAM 1000 MG: 500 TABLET, FILM COATED ORAL at 09:00

## 2023-02-25 RX ADMIN — MORPHINE SULFATE 30 MG: 30 TABLET, FILM COATED, EXTENDED RELEASE ORAL at 09:07

## 2023-02-25 RX ADMIN — LACOSAMIDE 150 MG: 100 TABLET, FILM COATED ORAL at 08:59

## 2023-02-25 RX ADMIN — SODIUM CHLORIDE: 9 INJECTION, SOLUTION INTRAVENOUS at 05:20

## 2023-02-25 RX ADMIN — MORPHINE SULFATE 15 MG: 15 TABLET, FILM COATED, EXTENDED RELEASE ORAL at 09:00

## 2023-02-25 RX ADMIN — POTASSIUM CHLORIDE 10 MEQ: 1500 TABLET, EXTENDED RELEASE ORAL at 11:38

## 2023-02-25 ASSESSMENT — ENCOUNTER SYMPTOMS
WHEEZING: 0
ABDOMINAL PAIN: 0
CHEST TIGHTNESS: 0
SHORTNESS OF BREATH: 0
BLOOD IN STOOL: 0
VOMITING: 0
DIARRHEA: 0
CONSTIPATION: 0
RHINORRHEA: 0
COUGH: 0
NAUSEA: 0

## 2023-02-25 ASSESSMENT — PAIN SCALES - GENERAL
PAINLEVEL_OUTOF10: 9
PAINLEVEL_OUTOF10: 7
PAINLEVEL_OUTOF10: 8
PAINLEVEL_OUTOF10: 8
PAINLEVEL_OUTOF10: 9
PAINLEVEL_OUTOF10: 8

## 2023-02-25 ASSESSMENT — PAIN DESCRIPTION - LOCATION
LOCATION: ABDOMEN

## 2023-02-25 ASSESSMENT — PAIN DESCRIPTION - DESCRIPTORS
DESCRIPTORS: CRAMPING

## 2023-02-25 ASSESSMENT — PAIN DESCRIPTION - ORIENTATION
ORIENTATION: LEFT

## 2023-02-25 ASSESSMENT — PAIN - FUNCTIONAL ASSESSMENT: PAIN_FUNCTIONAL_ASSESSMENT: PREVENTS OR INTERFERES SOME ACTIVE ACTIVITIES AND ADLS

## 2023-02-25 NOTE — PROGRESS NOTES
Occupational Therapy  Facility/Department: [facility] [address]  Occupational Therapy Daily Treatment Note    Name: Latonia Paula  : 1965  MRN: [id number]  Date of Service: 2023    Discharge Recommendations:  Patient would benefit from continued therapy after discharge in order to increase pt balance, safety and independence. Pt at increased risk for falls            Assessment   Performance deficits / Impairments: Decreased functional mobility ; Decreased ADL status; Decreased strength;Decreased safe awareness;Decreased cognition;Decreased high-level IADLs;Decreased balance;Decreased endurance  Prognosis: Good  REQUIRES OT FOLLOW-UP: Yes  Activity Tolerance  Activity Tolerance: Treatment limited secondary to decreased cognition;Patient limited by fatigue        Plan   Occupational Therapy Plan  Times Per Week: 3-4x/wk  Current Treatment Recommendations: Balance training, Strengthening, Functional mobility training, Endurance training, Patient/Caregiver education & training, Safety education & training, Equipment evaluation, education, & procurement, Home management training, Self-Care / ADL, Coordination training, Cognitive reorientation     Restrictions  Restrictions/Precautions  Restrictions/Precautions: Fall Risk, Seizure  Required Braces or Orthoses?: No  Position Activity Restriction  Other position/activity restrictions: 6/10 abdominal pain, pt. held pillow during movement, and re positioning provided. Subjective   General  Chart Reviewed: Yes  Family / Caregiver Present: No  General Comment  Comments: Pt agreeable to therapy this day with min encouragement. Pt supine in bed at start of session and retired to supine in bed at session end with bed alarm on and call light in reach. Pt c/o 8/10 pain in abdomen, no facial grimacing or groaning noted.  Pt repositioned for comfort at session end            Objective           Safety Devices  Type of Devices: Gait belt;Left in bed;Call light within reach; Bed alarm in place; Patient at risk for falls  Restraints  Restraints Initially in Place: No  Balance  Sitting: Without support (SBA Static/dynamic sitting unsupported at EOB tolerating approx 13-14 min)  Standing: With support (CGA static/dynamic standing during ADLs utilizing RW tolerating approx 3-4 min)  Gait  Overall Level of Assistance: Moderate assistance (EOB><bathroom utilizing RW requiring assist for walker management pt demo L side neglect with multiple points of contact on furniture and walls to L requiring assist pt impulsive demo P safety awareness, impulsive)  Toilet Transfers  Toilet - Technique: Ambulating  Equipment Used: Standard toilet  Toilet Transfer: Minimal assistance  Toilet Transfers Comments: utilizing RW     ADL  Grooming: Stand by assistance;Setup;Verbal cueing  Grooming Skilled Clinical Factors: hand hygiene facilitated standing at sink with RW and 0-2 hand support pt adamently declined further grooming tasks at sink  LE Dressing: Stand by assistance;Setup  LE Dressing Skilled Clinical Factors: to doff/don socks seated at EOB utilizing figure 4 technique demo G hip flexion  Toileting: Contact guard assistance;Setup; Increased time to complete  Toileting Skilled Clinical Factors: CGA for clothing management seated/standing with RW and SBA for personal hygiene seated on toilet. Min A for toilet transfer with RW pt required mod verbal/tactile cues on proper hand and foot placement  Additional Comments:  Throughout session pt limited per fatigue, decreased cognition and decreased balance        Bed mobility  Supine to Sit: Stand by assistance  Sit to Supine: Stand by assistance  Scooting: Stand by assistance  Bed Mobility Comments: HOB elevated, utilizing bed rails  Transfers  Sit to stand: Contact guard assistance  Stand to sit: Contact guard assistance  Transfer Comments: utilizing RW     Cognition  Overall Cognitive Status: Exceptions  Safety Judgement: Decreased awareness of need for assistance;Decreased awareness of need for safety  Problem Solving: Decreased awareness of errors  Insights: Decreased awareness of deficits  Cognition Comment: impulsive, flat affect  Orientation  Overall Orientation Status: Within Functional Limits                  Education Given To: Patient  Education Provided: Role of Therapy;Transfer Training;Precautions  Education Provided Comments: Proper hand and foot placement; walker management; balance maintaince; safety precautions-P/F carry over  Education Method: Verbal  Education Outcome: Continued education needed              AM-PAC Score        AM-[facility] Inpatient Daily Activity Raw Score: 19 (02/25/23 1558)  AM-PAC Inpatient ADL T-Scale Score : 40.22 (02/25/23 1558)  ADL Inpatient CMS 0-100% Score: 42.8 (02/25/23 1558)  ADL Inpatient CMS G-Code Modifier : CK (02/25/23 1558)      Goals  Short Term Goals  Time Frame for Short Term Goals: Pt will, by discharge:  Short Term Goal 1: Perform ADL tasks with mod IND using AE/DME PRN  Short Term Goal 2: Perform functional transfers/functional mobility with mod IND using least restrictive AD  Short Term Goal 3:  Independently demo good safety awareness/during engagement in all ADLs and functional transfers/functional mobility  Short Term Goal 4: Demo 10+ minutes standing tolerance with use of least restrictive AD for increased participation in ADL/IADL tasks       Therapy Time   Individual Concurrent Group Co-treatment   Time In 1513         Time Out 1536         Minutes 23         Timed Code Treatment Minutes: AISHWARYA Workman/JOAQUÍN

## 2023-02-25 NOTE — PLAN OF CARE
Problem: Discharge Planning  Goal: Discharge to home or other facility with appropriate resources  Outcome: Progressing     Problem: Pain  Goal: Verbalizes/displays adequate comfort level or baseline comfort level  Outcome: Progressing     Problem: Skin/Tissue Integrity  Goal: Absence of new skin breakdown  Description: 1. Monitor for areas of redness and/or skin breakdown  2. Assess vascular access sites hourly  3. Every 4-6 hours minimum:  Change oxygen saturation probe site  4. Every 4-6 hours:  If on nasal continuous positive airway pressure, respiratory therapy assess nares and determine need for appliance change or resting period.   Outcome: Progressing     Problem: Safety - Adult  Goal: Free from fall injury  Outcome: Progressing     Problem: Respiratory - Adult  Goal: Achieves optimal ventilation and oxygenation  Outcome: Progressing     Problem: Chronic Conditions and Co-morbidities  Goal: Patient's chronic conditions and co-morbidity symptoms are monitored and maintained or improved  Outcome: Progressing     Problem: ABCDS Injury Assessment  Goal: Absence of physical injury  Outcome: Progressing

## 2023-02-25 NOTE — PLAN OF CARE
Problem: Discharge Planning  Goal: Discharge to home or other facility with appropriate resources  2/25/2023 0236 by Alyssa Hayden RN  Outcome: Progressing  2/24/2023 1615 by Elida Ho RN  Outcome: Progressing     Problem: Pain  Goal: Verbalizes/displays adequate comfort level or baseline comfort level  2/25/2023 0236 by Alyssa Hayden RN  Outcome: Progressing  2/24/2023 1615 by Elida Ho RN  Outcome: Progressing     Problem: Skin/Tissue Integrity  Goal: Absence of new skin breakdown  Description: 1. Monitor for areas of redness and/or skin breakdown  2. Assess vascular access sites hourly  3. Every 4-6 hours minimum:  Change oxygen saturation probe site  4. Every 4-6 hours:  If on nasal continuous positive airway pressure, respiratory therapy assess nares and determine need for appliance change or resting period.   2/25/2023 0236 by Alyssa Hayden RN  Outcome: Progressing  2/24/2023 1615 by Elida Ho RN  Outcome: Progressing     Problem: Safety - Adult  Goal: Free from fall injury  2/25/2023 0236 by Alyssa Hayden RN  Outcome: Progressing  Flowsheets (Taken 2/25/2023 0234)  Free From Fall Injury: Instruct family/caregiver on patient safety  2/24/2023 1615 by Elida Ho RN  Outcome: Progressing     Problem: Respiratory - Adult  Goal: Achieves optimal ventilation and oxygenation  2/25/2023 0236 by Alyssa Hayden RN  Outcome: Progressing  Flowsheets (Taken 2/24/2023 2000)  Achieves optimal ventilation and oxygenation:   Assess for changes in respiratory status   Assess for changes in mentation and behavior   Position to facilitate oxygenation and minimize respiratory effort  2/24/2023 1615 by Elida Ho RN  Outcome: Progressing     Problem: Chronic Conditions and Co-morbidities  Goal: Patient's chronic conditions and co-morbidity symptoms are monitored and maintained or improved  2/25/2023 0236 by Alyssa Hayden RN  Outcome: Progressing  2/24/2023 1615 by Qamar Arredondo Zita Chin RN  Outcome: Progressing     Problem: ABCDS Injury Assessment  Goal: Absence of physical injury  2/25/2023 0236 by Joey Mustafa RN  Outcome: Progressing  Flowsheets (Taken 2/25/2023 0234)  Absence of Physical Injury: Implement safety measures based on patient assessment  2/24/2023 1615 by Lana Low RN  Outcome: Progressing

## 2023-02-25 NOTE — CARE COORDINATION
Transitional planning note: plan is SNF. Referral was faxed to [facility] yesterday.  Call placed to [facility] in admissions at [facility] and left vm message requesting call back for update on status of referral.
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
## 2023-02-26 LAB
GLUCOSE BLD-MCNC: 112 MG/DL (ref 65–105)
GLUCOSE BLD-MCNC: 119 MG/DL (ref 65–105)
GLUCOSE BLD-MCNC: 121 MG/DL (ref 65–105)
GLUCOSE BLD-MCNC: 122 MG/DL (ref 65–105)

## 2023-02-26 PROCEDURE — 6370000000 HC RX 637 (ALT 250 FOR IP): Performed by: INTERNAL MEDICINE

## 2023-02-26 PROCEDURE — 6360000002 HC RX W HCPCS: Performed by: INTERNAL MEDICINE

## 2023-02-26 PROCEDURE — 2580000003 HC RX 258: Performed by: INTERNAL MEDICINE

## 2023-02-26 PROCEDURE — 2060000000 HC ICU INTERMEDIATE R&B

## 2023-02-26 PROCEDURE — 6370000000 HC RX 637 (ALT 250 FOR IP): Performed by: FAMILY MEDICINE

## 2023-02-26 PROCEDURE — 99232 SBSQ HOSP IP/OBS MODERATE 35: CPT | Performed by: INTERNAL MEDICINE

## 2023-02-26 PROCEDURE — 99232 SBSQ HOSP IP/OBS MODERATE 35: CPT | Performed by: FAMILY MEDICINE

## 2023-02-26 PROCEDURE — 82947 ASSAY GLUCOSE BLOOD QUANT: CPT

## 2023-02-26 RX ORDER — ATORVASTATIN CALCIUM 40 MG/1
40 TABLET, FILM COATED ORAL NIGHTLY
Status: DISCONTINUED | OUTPATIENT
Start: 2023-02-26 | End: 2023-03-06 | Stop reason: HOSPADM

## 2023-02-26 RX ORDER — ASPIRIN 81 MG/1
81 TABLET, CHEWABLE ORAL DAILY
Status: DISCONTINUED | OUTPATIENT
Start: 2023-02-26 | End: 2023-03-06 | Stop reason: HOSPADM

## 2023-02-26 RX ADMIN — LORAZEPAM 1 MG: 1 TABLET ORAL at 08:09

## 2023-02-26 RX ADMIN — ASPIRIN 81 MG: 81 TABLET, CHEWABLE ORAL at 13:21

## 2023-02-26 RX ADMIN — METOPROLOL TARTRATE 12.5 MG: 25 TABLET ORAL at 13:21

## 2023-02-26 RX ADMIN — LACOSAMIDE 150 MG: 100 TABLET, FILM COATED ORAL at 20:48

## 2023-02-26 RX ADMIN — SENNOSIDES 8.6 MG: 8.6 TABLET, COATED ORAL at 20:49

## 2023-02-26 RX ADMIN — ENOXAPARIN SODIUM 40 MG: 100 INJECTION SUBCUTANEOUS at 08:08

## 2023-02-26 RX ADMIN — DESMOPRESSIN ACETATE 40 MG: 0.2 TABLET ORAL at 20:48

## 2023-02-26 RX ADMIN — DOCUSATE SODIUM 100 MG: 100 CAPSULE ORAL at 08:08

## 2023-02-26 RX ADMIN — LACOSAMIDE 150 MG: 100 TABLET, FILM COATED ORAL at 08:08

## 2023-02-26 RX ADMIN — MECLIZINE 12.5 MG: 12.5 TABLET ORAL at 17:48

## 2023-02-26 RX ADMIN — LAMOTRIGINE 125 MG: 100 TABLET ORAL at 08:08

## 2023-02-26 RX ADMIN — OXYCODONE HYDROCHLORIDE AND ACETAMINOPHEN 1 TABLET: 5; 325 TABLET ORAL at 05:42

## 2023-02-26 RX ADMIN — SODIUM CHLORIDE, PRESERVATIVE FREE 10 ML: 5 INJECTION INTRAVENOUS at 20:57

## 2023-02-26 RX ADMIN — MORPHINE SULFATE 30 MG: 30 TABLET, FILM COATED, EXTENDED RELEASE ORAL at 08:10

## 2023-02-26 RX ADMIN — MORPHINE SULFATE 30 MG: 30 TABLET, FILM COATED, EXTENDED RELEASE ORAL at 20:52

## 2023-02-26 RX ADMIN — MORPHINE SULFATE 15 MG: 15 TABLET, FILM COATED, EXTENDED RELEASE ORAL at 08:09

## 2023-02-26 RX ADMIN — PANTOPRAZOLE SODIUM 40 MG: 40 TABLET, DELAYED RELEASE ORAL at 08:08

## 2023-02-26 RX ADMIN — SERTRALINE 100 MG: 50 TABLET, FILM COATED ORAL at 08:08

## 2023-02-26 RX ADMIN — LAMOTRIGINE 125 MG: 100 TABLET ORAL at 20:48

## 2023-02-26 RX ADMIN — MECLIZINE 12.5 MG: 12.5 TABLET ORAL at 08:08

## 2023-02-26 RX ADMIN — MORPHINE SULFATE 15 MG: 15 TABLET, FILM COATED, EXTENDED RELEASE ORAL at 20:52

## 2023-02-26 RX ADMIN — LORAZEPAM 1 MG: 1 TABLET ORAL at 17:47

## 2023-02-26 RX ADMIN — POTASSIUM CHLORIDE 10 MEQ: 1500 TABLET, EXTENDED RELEASE ORAL at 17:48

## 2023-02-26 RX ADMIN — METOPROLOL TARTRATE 12.5 MG: 25 TABLET ORAL at 20:49

## 2023-02-26 RX ADMIN — SODIUM CHLORIDE, PRESERVATIVE FREE 10 ML: 5 INJECTION INTRAVENOUS at 08:09

## 2023-02-26 RX ADMIN — LEVETIRACETAM 1000 MG: 500 TABLET, FILM COATED ORAL at 08:08

## 2023-02-26 RX ADMIN — Medication 5 MG: at 20:49

## 2023-02-26 RX ADMIN — OXYCODONE HYDROCHLORIDE AND ACETAMINOPHEN 1 TABLET: 5; 325 TABLET ORAL at 11:39

## 2023-02-26 RX ADMIN — OXYCODONE HYDROCHLORIDE AND ACETAMINOPHEN 1 TABLET: 5; 325 TABLET ORAL at 17:47

## 2023-02-26 RX ADMIN — POTASSIUM CHLORIDE 10 MEQ: 1500 TABLET, EXTENDED RELEASE ORAL at 08:08

## 2023-02-26 RX ADMIN — LEVETIRACETAM 1000 MG: 500 TABLET, FILM COATED ORAL at 20:48

## 2023-02-26 ASSESSMENT — PAIN DESCRIPTION - LOCATION
LOCATION: ABDOMEN;BACK
LOCATION: ABDOMEN

## 2023-02-26 ASSESSMENT — ENCOUNTER SYMPTOMS
CONSTIPATION: 0
RHINORRHEA: 0
DIARRHEA: 0
ABDOMINAL PAIN: 0
NAUSEA: 0
WHEEZING: 0
VOMITING: 0
BLOOD IN STOOL: 0
CHEST TIGHTNESS: 0
SHORTNESS OF BREATH: 0
COUGH: 0

## 2023-02-26 ASSESSMENT — PAIN - FUNCTIONAL ASSESSMENT: PAIN_FUNCTIONAL_ASSESSMENT: PREVENTS OR INTERFERES SOME ACTIVE ACTIVITIES AND ADLS

## 2023-02-26 ASSESSMENT — PAIN DESCRIPTION - ORIENTATION
ORIENTATION: RIGHT
ORIENTATION: MID

## 2023-02-26 ASSESSMENT — PAIN SCALES - GENERAL
PAINLEVEL_OUTOF10: 9
PAINLEVEL_OUTOF10: 8
PAINLEVEL_OUTOF10: 7
PAINLEVEL_OUTOF10: 8
PAINLEVEL_OUTOF10: 8

## 2023-02-26 ASSESSMENT — PAIN DESCRIPTION - DESCRIPTORS
DESCRIPTORS: CRAMPING
DESCRIPTORS: ACHING
DESCRIPTORS: CRAMPING
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
## 2023-02-26 NOTE — PLAN OF CARE
Problem: Discharge Planning  Goal: Discharge to home or other facility with appropriate resources  2/25/2023 1928 by Agustina Hope RN  Outcome: Progressing  2/25/2023 1821 by Tara Cobos RN  Outcome: Progressing     Problem: Pain  Goal: Verbalizes/displays adequate comfort level or baseline comfort level  2/25/2023 1928 by Agustina Hope RN  Outcome: Progressing  2/25/2023 1821 by Tara Cobos RN  Outcome: Progressing     Problem: Skin/Tissue Integrity  Goal: Absence of new skin breakdown  Description: 1. Monitor for areas of redness and/or skin breakdown  2. Assess vascular access sites hourly  3. Every 4-6 hours minimum:  Change oxygen saturation probe site  4. Every 4-6 hours:  If on nasal continuous positive airway pressure, respiratory therapy assess nares and determine need for appliance change or resting period.   2/25/2023 1928 by Agustina Hope RN  Outcome: Progressing  2/25/2023 1821 by Tara Cobos RN  Outcome: Progressing     Problem: Safety - Adult  Goal: Free from fall injury  2/25/2023 1928 by Agustina Hope RN  Outcome: Progressing  2/25/2023 1821 by Tara Cobos RN  Outcome: Progressing     Problem: Respiratory - Adult  Goal: Achieves optimal ventilation and oxygenation  2/25/2023 1928 by Agustina Hope RN  Outcome: Progressing  2/25/2023 1821 by Tara Cobos RN  Outcome: Progressing     Problem: Chronic Conditions and Co-morbidities  Goal: Patient's chronic conditions and co-morbidity symptoms are monitored and maintained or improved  2/25/2023 1928 by Agustina Hope RN  Outcome: Progressing  2/25/2023 1821 by Tara Cobos RN  Outcome: Progressing     Problem: ABCDS Injury Assessment  Goal: Absence of physical injury  2/25/2023 1928 by Agustina Hope RN  Outcome: Progressing  2/25/2023 1821 by Tara Cobos RN  Outcome: Progressing

## 2023-02-27 LAB
BILIRUBIN URINE: NEGATIVE
CANCER AG125 SERPL-ACNC: 368 U/ML
CASTS UA: NORMAL /LPF (ref 0–8)
COLOR: ABNORMAL
EPITHELIAL CELLS UA: NORMAL /HPF (ref 0–5)
GLUCOSE BLD-MCNC: 110 MG/DL (ref 65–105)
GLUCOSE BLD-MCNC: 114 MG/DL (ref 65–105)
GLUCOSE BLD-MCNC: 122 MG/DL (ref 65–105)
GLUCOSE BLD-MCNC: 137 MG/DL (ref 65–105)
GLUCOSE UR STRIP.AUTO-MCNC: NEGATIVE MG/DL
KETONES UR STRIP.AUTO-MCNC: NEGATIVE MG/DL
LEUKOCYTE ESTERASE UR QL STRIP.AUTO: ABNORMAL
NITRITE UR QL STRIP.AUTO: POSITIVE
PROT UR STRIP.AUTO-MCNC: 5.5 MG/DL (ref 5–8)
PROT UR STRIP.AUTO-MCNC: ABNORMAL MG/DL
RBC CLUMPS #/AREA URNS AUTO: NORMAL /HPF (ref 0–4)
SPECIFIC GRAVITY UA: 1.04 (ref 1–1.03)
TURBIDITY: CLEAR
URINE HGB: ABNORMAL
UROBILINOGEN, URINE: NORMAL
WBC UA: NORMAL /HPF (ref 0–5)

## 2023-02-27 PROCEDURE — 6370000000 HC RX 637 (ALT 250 FOR IP): Performed by: INTERNAL MEDICINE

## 2023-02-27 PROCEDURE — 82947 ASSAY GLUCOSE BLOOD QUANT: CPT

## 2023-02-27 PROCEDURE — 87186 SC STD MICRODIL/AGAR DIL: CPT

## 2023-02-27 PROCEDURE — 87086 URINE CULTURE/COLONY COUNT: CPT

## 2023-02-27 PROCEDURE — 97110 THERAPEUTIC EXERCISES: CPT

## 2023-02-27 PROCEDURE — 36415 COLL VENOUS BLD VENIPUNCTURE: CPT

## 2023-02-27 PROCEDURE — 87077 CULTURE AEROBIC IDENTIFY: CPT

## 2023-02-27 PROCEDURE — 2060000000 HC ICU INTERMEDIATE R&B

## 2023-02-27 PROCEDURE — 97116 GAIT TRAINING THERAPY: CPT

## 2023-02-27 PROCEDURE — 94761 N-INVAS EAR/PLS OXIMETRY MLT: CPT

## 2023-02-27 PROCEDURE — 86304 IMMUNOASSAY TUMOR CA 125: CPT

## 2023-02-27 PROCEDURE — 99232 SBSQ HOSP IP/OBS MODERATE 35: CPT | Performed by: FAMILY MEDICINE

## 2023-02-27 PROCEDURE — 6370000000 HC RX 637 (ALT 250 FOR IP): Performed by: FAMILY MEDICINE

## 2023-02-27 PROCEDURE — 2580000003 HC RX 258: Performed by: INTERNAL MEDICINE

## 2023-02-27 PROCEDURE — 86403 PARTICLE AGGLUT ANTBDY SCRN: CPT

## 2023-02-27 PROCEDURE — 6360000002 HC RX W HCPCS: Performed by: INTERNAL MEDICINE

## 2023-02-27 PROCEDURE — 81001 URINALYSIS AUTO W/SCOPE: CPT

## 2023-02-27 RX ADMIN — LAMOTRIGINE 125 MG: 100 TABLET ORAL at 08:31

## 2023-02-27 RX ADMIN — OXYCODONE HYDROCHLORIDE AND ACETAMINOPHEN 1 TABLET: 5; 325 TABLET ORAL at 23:10

## 2023-02-27 RX ADMIN — SODIUM CHLORIDE, PRESERVATIVE FREE 10 ML: 5 INJECTION INTRAVENOUS at 20:14

## 2023-02-27 RX ADMIN — POTASSIUM CHLORIDE 10 MEQ: 1500 TABLET, EXTENDED RELEASE ORAL at 17:51

## 2023-02-27 RX ADMIN — Medication 5 MG: at 20:10

## 2023-02-27 RX ADMIN — SERTRALINE 100 MG: 50 TABLET, FILM COATED ORAL at 08:29

## 2023-02-27 RX ADMIN — DESMOPRESSIN ACETATE 40 MG: 0.2 TABLET ORAL at 20:10

## 2023-02-27 RX ADMIN — DOCUSATE SODIUM 100 MG: 100 CAPSULE ORAL at 20:10

## 2023-02-27 RX ADMIN — LEVETIRACETAM 1000 MG: 500 TABLET, FILM COATED ORAL at 08:30

## 2023-02-27 RX ADMIN — PANTOPRAZOLE SODIUM 40 MG: 40 TABLET, DELAYED RELEASE ORAL at 08:30

## 2023-02-27 RX ADMIN — ENOXAPARIN SODIUM 40 MG: 100 INJECTION SUBCUTANEOUS at 08:32

## 2023-02-27 RX ADMIN — POTASSIUM CHLORIDE 10 MEQ: 1500 TABLET, EXTENDED RELEASE ORAL at 08:29

## 2023-02-27 RX ADMIN — DOCUSATE SODIUM 100 MG: 100 CAPSULE ORAL at 08:30

## 2023-02-27 RX ADMIN — MORPHINE SULFATE 30 MG: 30 TABLET, FILM COATED, EXTENDED RELEASE ORAL at 20:12

## 2023-02-27 RX ADMIN — MECLIZINE 12.5 MG: 12.5 TABLET ORAL at 08:30

## 2023-02-27 RX ADMIN — LAMOTRIGINE 125 MG: 100 TABLET ORAL at 21:03

## 2023-02-27 RX ADMIN — OXYCODONE HYDROCHLORIDE AND ACETAMINOPHEN 1 TABLET: 5; 325 TABLET ORAL at 14:34

## 2023-02-27 RX ADMIN — MORPHINE SULFATE 30 MG: 30 TABLET, FILM COATED, EXTENDED RELEASE ORAL at 08:30

## 2023-02-27 RX ADMIN — OXYCODONE HYDROCHLORIDE AND ACETAMINOPHEN 1 TABLET: 5; 325 TABLET ORAL at 08:30

## 2023-02-27 RX ADMIN — LEVETIRACETAM 1000 MG: 500 TABLET, FILM COATED ORAL at 20:10

## 2023-02-27 RX ADMIN — LACOSAMIDE 150 MG: 100 TABLET, FILM COATED ORAL at 08:29

## 2023-02-27 RX ADMIN — LACOSAMIDE 150 MG: 100 TABLET, FILM COATED ORAL at 20:11

## 2023-02-27 RX ADMIN — LORAZEPAM 1 MG: 1 TABLET ORAL at 20:12

## 2023-02-27 RX ADMIN — SENNOSIDES 8.6 MG: 8.6 TABLET, COATED ORAL at 20:09

## 2023-02-27 RX ADMIN — METOPROLOL TARTRATE 12.5 MG: 25 TABLET ORAL at 20:06

## 2023-02-27 RX ADMIN — LORAZEPAM 1 MG: 1 TABLET ORAL at 08:30

## 2023-02-27 RX ADMIN — SODIUM CHLORIDE, PRESERVATIVE FREE 10 ML: 5 INJECTION INTRAVENOUS at 08:31

## 2023-02-27 RX ADMIN — METOPROLOL TARTRATE 12.5 MG: 25 TABLET ORAL at 08:30

## 2023-02-27 RX ADMIN — MORPHINE SULFATE 15 MG: 15 TABLET, FILM COATED, EXTENDED RELEASE ORAL at 08:31

## 2023-02-27 RX ADMIN — ACETAMINOPHEN 650 MG: 325 TABLET ORAL at 02:53

## 2023-02-27 RX ADMIN — MORPHINE SULFATE 15 MG: 15 TABLET, FILM COATED, EXTENDED RELEASE ORAL at 20:14

## 2023-02-27 RX ADMIN — ASPIRIN 81 MG: 81 TABLET, CHEWABLE ORAL at 08:30

## 2023-02-27 RX ADMIN — OXYCODONE HYDROCHLORIDE AND ACETAMINOPHEN 1 TABLET: 5; 325 TABLET ORAL at 00:46

## 2023-02-27 ASSESSMENT — ENCOUNTER SYMPTOMS
VOMITING: 0
BLOOD IN STOOL: 0
RHINORRHEA: 0
CONSTIPATION: 0
NAUSEA: 0
DIARRHEA: 0
ABDOMINAL PAIN: 0
COUGH: 0
CHEST TIGHTNESS: 0
WHEEZING: 0
SHORTNESS OF BREATH: 0

## 2023-02-27 ASSESSMENT — PAIN SCALES - GENERAL
PAINLEVEL_OUTOF10: 8
PAINLEVEL_OUTOF10: 2

## 2023-02-27 ASSESSMENT — PAIN DESCRIPTION - LOCATION
LOCATION: ABDOMEN
LOCATION: ABDOMEN;BACK

## 2023-02-27 ASSESSMENT — PAIN DESCRIPTION - DESCRIPTORS
DESCRIPTORS: CRAMPING
DESCRIPTORS: CRAMPING

## 2023-02-27 ASSESSMENT — PAIN DESCRIPTION - ORIENTATION: ORIENTATION: RIGHT
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
## 2023-02-27 NOTE — PROGRESS NOTES
Occupational [address]  Occupational Therapy Not Seen Note    DATE: 2023    NAME: Kenya Osman  MRN: [id number]   : 1965      Patient not seen this date for Occupational Therapy due to: Other: First attempt pt eating lunch. Second attempt pt/daughter on phone sorting bills.      Next Scheduled Treatment:     Electronically signed by Minus Form S/TOVA   Wanda VERMA on 2023 at 3:36 PM

## 2023-02-27 NOTE — PROGRESS NOTES
[facility]  Progress Note                   Date:   2/26/2023  Patient name: Blanca Stout [facility]  Date of admission:  2/19/2023  5:59 PM  MRN:   [id number]  YOB: 1965  PCP: Kristopher Barton DO    Reason for Admission: Seizure ([address] [R56.9]  Altered mental status, unspecified altered mental status type [R41.82]    Subjective:       Clinical Changes /Abnormalities:Patient seen and examined in room. Denies chest pain, or SOB. Tele/vitals/labs reviewed. Remains SR. Review of Systems    Medications:   Scheduled Meds:   aspirin  81 mg Oral Daily    metoprolol tartrate  12.5 mg Oral BID    atorvastatin  40 mg Oral Nightly    melatonin  5 mg Oral Nightly    morphine  30 mg Oral 2 times per day    pantoprazole  40 mg Oral Daily    potassium chloride  10 mEq Oral BID WC    sertraline  100 mg Oral Daily    docusate sodium  100 mg Oral BID    senna  1 tablet Oral Nightly    insulin lispro  0-4 Units SubCUTAneous 4x Daily AC & HS    sodium chloride flush  5-40 mL IntraVENous 2 times per day    enoxaparin  40 mg SubCUTAneous Daily    lamoTRIgine  125 mg Oral BID    lacosamide  150 mg Oral BID    levETIRAcetam  1,000 mg Oral BID    morphine  15 mg Oral BID     Continuous Infusions:   dextrose      sodium chloride Stopped (02/25/23 0629)     CBC:   No results for input(s): WBC, HGB, PLT in the last 72 hours. BMP:    Recent Labs     02/24/23  1105 02/25/23  1054    135   K 3.0* 4.2    102   CO2 25 25   BUN 8 7*   CREATININE 0.46* 0.49*   GLUCOSE 139* 124*       Hepatic:  No results for input(s): AST, ALT, ALB, BILITOT, ALKPHOS in the last 72 hours. Troponin:   No results for input(s): TROPHS in the last 72 hours. BNP: No results for input(s): BNP in the last 72 hours. Lipids: No results for input(s): CHOL, HDL in the last 72 hours. Invalid input(s): LDLCALCU  INR:   No results for input(s): INR in the last 72 hours.     EKG:    Date: 02/20/23  Reading:   Sinus tachycardia with occasional , and consecutive Premature ventricular complexes and Fusion complexes  Indeterminate axis  Septal infarct (cited on or before 10-OCT-2022)  Abnormal ECG  When compared with ECG of 10-OCT-2022 05:05,  Premature ventricular complexes are now Present  QRS axis Shifted right  ST now depressed in Anterior leads  T wave inversion no longer evident in Inferior leads  Nonspecific T wave abnormality now evident in Lateral leads     LAST ECHO:  Date: 2/22  Findings Summary:  Limited study  Global left ventricular systolic function is normal.  Estimated ejection fraction is 55 % . Small to moderate pericardial effusion seen mostly posteriorly, somewhat  improved from last study in 06/2021. No echo signs of tamponade. LAST Stress Test:   NA     LAST Cardiac Angiography:.  Date: 1/21  Findings:  Procedure Summary  Single vessel CAD (Small PDA)  Lower normal LV function    ECHO 2/25/23  Summary  Left ventricle is normal in size. Global left ventricular systolic function  is mildly reduced. Estimated LVEF 35-40%  Akinetic apex. Normal right ventricular size and function. Mild mitral regurgitation. Objective:   Vitals: /82   Pulse 100   Temp 98.6 °F (37 °C) (Oral)   Resp 23   Wt 137 lb 5.6 oz (62.3 kg)   SpO2 97%   BMI 22.86 kg/m²   General appearance: alert and cooperative with exam  HEENT: Head: Normocephalic, no lesions, without obvious abnormality.   Neck:no JVD, trachea midline, no adenopathy  Lungs: very dim to auscultation throughout, RA without distress  Heart: Regular rate and rhythm, s1/s2 auscultated, no murmurs, SR   Abdomen: soft, non-tender, bowel sounds active  Extremities: generalized +1 edema  Neurologic: not done        Assessment / Acute Cardiac Problems:   Seizure disorder  HS trop elevation Trend has been 31>31>40>295>100  Hematuria  Type 2 diabetes  History of ovarian cancer with mets   Hypokalemia  Had pericardial effusion seen on limited echo 1 year ago   Status post recent vascular embolization       Patient Active Problem List:     Epithelial ovarian cancer, FIGO stage OSIRIS ([address]     Goals of care, counseling/discussion     Anemia     Splenic abscess     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 ([address]     Acute encephalopathy     PRES (posterior reversible encephalopathy syndrome)     Hemianopia, bitemporal     Encephalopathy     Seizure disorder, status epilepticus, nonconvulsive (HCC)     History of ovarian cancer     Pleural effusion     Bandemia     Cancer, metastatic to bone (HCC)     Intractable low back pain     Fatigue     Chronic deep vein thrombosis (DVT) of femoral vein of left lower extremity (HCC)     Iron deficiency anemia secondary to inadequate dietary iron intake     Iron malabsorption     Gynecologic cancer (HCC)     Thrombocytosis     History of deep venous thrombosis (DVT) of distal vein of left lower extremity: On Eliquis     Pelvic mass     Acute on chronic anemia     AMS (altered mental status)     Lactic acidosis     Anxiety     Diabetes mellitus (HCC)     Gastroesophageal reflux disease     Recurrent major depressive disorder, in partial remission (HCC)     Ileus (HCC)     Pericardial effusion     Hypokalemia     Metabolic encephalopathy     Delirium due to another medical condition     Urinary tract infection without hematuria     Seizure disorder (HCC)     Leg swelling     Iron deficiency     Drug-induced constipation     Septicemia (HCC)     Hallucinations     Cerebrovascular accident (CVA) ([address]     Difficulty with speech     MDD (major depressive disorder), recurrent severe, without psychosis ([address]     Seizure ([address]     Somnolence     Calculus of gallbladder without cholecystitis without obstruction     Intestinal adhesions with partial obstruction (HCC)     Traumatic rhabdomyolysis ([address]      Plan of Treatment:   Stable. Denies any chest pain. No acute ECG changes.  Trop elevation likely demand ischemia secondary to multiple co-morbidities. Metastatic ovarian cancer followed by oncology - may not be candidate for any further CA treatments. F/U as OP  Cholelithiasis, asymptomatic- Gen surg s/o and said not surgical candidate,  percut choley tube if becomes symptomatic. ECHO reviewed as above, EF 35-40%. Discussed in detail further cardiac workup. Questions and concerns addressed. Will add Entresto 24mg/26 mg 1/2 tab as blood pressure tolerates. Long discussion with patient regarding elevated troponin in addition to multiple co-morbidities. Risks versus benefits regarding further invasive workup discussed in detail with patient. She continues to refuse and is not interested in any invasive treatments at this time from a CV standpoint. Will continue med management and f/u in clinic as OP.     Electronically signed by ESTRELLITA Peña CNP on 2/26/2023 at 7:03 PM  [address]  [phone]

## 2023-02-27 NOTE — PROGRESS NOTES
Physical Therapy  Facility/Department: [facility]  Daily Treatment note    Name: Brady Shipley  : 1965  MRN: [id number]  Date of Service: 2023    Discharge Recommendations:  Patient would benefit from continued therapy after discharge   PT Equipment Recommendations  Equipment Needed: No      Patient Diagnosis(es): The encounter diagnosis was Altered mental status, unspecified altered mental status type. Past Medical History:  has a past medical history of Anemia, Bleeding, Calculus of gallbladder without cholecystitis without obstruction, Cervical cancer ([address] Depression, Diabetes mellitus ([address] GERD (gastroesophageal reflux disease), Hx of blood clots, Hypertension, Intestinal adhesions with partial obstruction ([address] Metastatic cancer ([address] Ovarian cancer ([address] Post chemo evaluation, PRES (posterior reversible encephalopathy syndrome), Somnolence, Splenic lesion, and Traumatic rhabdomyolysis ([facility] Past Surgical History:  has a past surgical history that includes Hysterectomy, total abdominal; Port Surgery; Tonsillectomy; IR PORT PLACEMENT > 5 YEARS (2020); Anus surgery; Abscess Drainage (); colectomy (2013); IR EMBOLIZATION HEMORRHAGE (10/05/2020); and Cardiac catheterization. Assessment   Body Structures, Functions, Activity Limitations Requiring Skilled Therapeutic Intervention: Decreased functional mobility ; Decreased endurance; Increased pain;Decreased balance;Decreased safe awareness  Assessment: The pt able to amb 30ft with RW and MIN A for safety , limited by weakness fatiguea nd dizziness. . Recommend continued PT to address deficits and maximize safety. Therapy Prognosis: Fair  Requires PT Follow-Up: Yes  Activity Tolerance  Activity Tolerance: Patient limited by pain; Patient limited by endurance;Treatment limited secondary to medical complications  Activity Tolerance Comments: Dizziness     Plan   Physcial Therapy Plan  General Plan: 3-5 times per week  Current Treatment Recommendations: Strengthening, ROM, Balance training, Functional mobility training, Transfer training, Endurance training, Gait training, Stair training, Home exercise program, Safety education & training, Therapeutic activities, Patient/Caregiver education & training  Safety Devices  Type of Devices: Nurse notified, Gait belt, Left in bed, Bed alarm in place, Call light within reach, Patient at risk for falls  Restraints  Restraints Initially in Place: No     Restrictions  Restrictions/Precautions  Restrictions/Precautions: Fall Risk, Seizure  Required Braces or Orthoses?: No  Position Activity Restriction  Other position/activity restrictions: 6/10 abdominal pain, pt. held pillow during movement, and re positioning provided. Subjective   General  Patient assessed for rehabilitation services?: Yes  Response To Previous Treatment: Patient with no complaints from previous session. Family / Caregiver Present: No  Follows Commands: Within Functional Limits  Subjective  Subjective: Pt supine in bed upon arrival , pleasnat and cooperatiove t/o            Cognition   Orientation  Overall Orientation Status: Within Functional Limits  Cognition  Overall Cognitive Status: WFL  Insights: Not aware of deficits  Initiation: Requires cues for some  Sequencing: Requires cues for some  Cognition Comment: Pt very impulsive and demo poor safety awareness. Bed mobility  Rolling to Left: Stand by assistance  Rolling to Right: Stand by assistance  Supine to Sit: Minimal assistance  Sit to Supine: Contact guard assistance  Scooting: Contact guard assistance  Bed Mobility Comments: Rolling x4 performed for pericare. Cueing for hand placement and sequencing required. Pt very impulsive requiring Cueing for safety and sequencing, Pt c/o dizziness upon sittinag at EOB  Transfers  Sit to Stand: Minimal Assistance  Stand to Sit: Minimal Assistance  Comment: STS x3 with RW dizziness t/o limiting treatment session.  Stood x5 mins total , limited by fatigue and pt c/o of dizziness  Ambulation  Surface: Level tile  Device: Rolling Walker  Assistance: Minimal assistance  Gait Deviations: Slow Cynthia;Decreased step length;Decreased step height; Increased TAWANDA  Distance: 30ft  Comments: Limited by weakness fatigue and dizziness. More Ambulation?: No     Balance  Sitting - Dynamic: Fair;+  Standing - Static: Fair;+ (stood x3 for a total of 5mins)  Standing - Dynamic: Fair  Exercise Treatment:Ankle pumps, heel slide, short arc quads, straight leg raises.  Reps x10      AM-PAC Score  AM-PAC Inpatient Mobility Raw Score : 17 (02/22/23 1602)  AM-PAC Inpatient T-Scale Score : 42.13 (02/22/23 1602)  Mobility Inpatient CMS 0-100% Score: 50.57 (02/22/23 1602)  Mobility Inpatient CMS G-Code Modifier : CK (02/22/23 1602)     Goals  Short Term Goals  Time Frame for Short Term Goals: 14 visits  Short Term Goal 1: Perform bed mobility and functional transfers independently  Short Term Goal 2: Ambulate 200ft with RW and supervision  Short Term Goal 3: Demo Good- dynamic standing balance to decrease risk of falls       Education  Patient Education  Education Given To: Patient  Education Provided: Role of Therapy;Plan of Care;Transfer Training  Education Method: Verbal  Barriers to Learning: Cognition  Education Outcome: Continued education needed;Verbalized understanding      Therapy Time   Individual Concurrent Group Co-treatment   Time In 1144         Time Out 1225         Minutes 41         Timed Code Treatment Minutes: [address], PTA Never smoker
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
## 2023-02-27 NOTE — CARE COORDINATION
Transitional planning: LM for Chin Irizarry at [facility] requesting return call regarding status of referral. Call back number provided. 36 VM from [phone] has denied admission. No reason provided. [phone] Phone call to [facility] at [facility] to follow up on the status of the referral sent on the 22nd. [facility] states that she was told on Friday by CM the family wanted to go with someone different. Writer asked if they would consider accepting patient at this time. She states they will review and call back. 200 LM for daughter Andrea requesting return call. Call back number provided. 12 Spoke to patient and daughter at bedside regarding [facility] declining and [facility] reviewing. Daughter will provide additional choices.

## 2023-02-27 NOTE — PLAN OF CARE
Problem: Discharge Planning  Goal: Discharge to home or other facility with appropriate resources  2/26/2023 1924 by Luis A Edmondson RN  Outcome: Progressing  2/26/2023 1813 by Lauro Albright RN  Outcome: Progressing     Problem: Pain  Goal: Verbalizes/displays adequate comfort level or baseline comfort level  2/26/2023 1924 by Luis A Edmondson RN  Outcome: Progressing  2/26/2023 1813 by Lauro Albright RN  Outcome: Progressing     Problem: Skin/Tissue Integrity  Goal: Absence of new skin breakdown  Description: 1. Monitor for areas of redness and/or skin breakdown  2. Assess vascular access sites hourly  3. Every 4-6 hours minimum:  Change oxygen saturation probe site  4. Every 4-6 hours:  If on nasal continuous positive airway pressure, respiratory therapy assess nares and determine need for appliance change or resting period.   2/26/2023 1924 by Luis A Edmondson RN  Outcome: Progressing  2/26/2023 1813 by Lauro Albright RN  Outcome: Progressing     Problem: Safety - Adult  Goal: Free from fall injury  2/26/2023 1924 by Luis A Edmondson RN  Outcome: Progressing  2/26/2023 1813 by Lauro Albright RN  Outcome: Progressing     Problem: Respiratory - Adult  Goal: Achieves optimal ventilation and oxygenation  2/26/2023 1924 by Luis A Edmondson RN  Outcome: Progressing  2/26/2023 1813 by Lauro Albright RN  Outcome: Progressing     Problem: Chronic Conditions and Co-morbidities  Goal: Patient's chronic conditions and co-morbidity symptoms are monitored and maintained or improved  2/26/2023 1924 by Luis A Edmondson RN  Outcome: Progressing  2/26/2023 1813 by Lauro Albright RN  Outcome: Progressing     Problem: ABCDS Injury Assessment  Goal: Absence of physical injury  2/26/2023 1924 by Luis A Edmondson RN  Outcome: Progressing  2/26/2023 1813 by Lauro Albright RN  Outcome: Progressing
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
## 2023-02-28 LAB
GLUCOSE BLD-MCNC: 120 MG/DL (ref 65–105)
GLUCOSE BLD-MCNC: 134 MG/DL (ref 65–105)
GLUCOSE BLD-MCNC: 136 MG/DL (ref 65–105)
GLUCOSE BLD-MCNC: 84 MG/DL (ref 65–105)

## 2023-02-28 PROCEDURE — 99232 SBSQ HOSP IP/OBS MODERATE 35: CPT | Performed by: INTERNAL MEDICINE

## 2023-02-28 PROCEDURE — 2580000003 HC RX 258: Performed by: INTERNAL MEDICINE

## 2023-02-28 PROCEDURE — 6370000000 HC RX 637 (ALT 250 FOR IP): Performed by: FAMILY MEDICINE

## 2023-02-28 PROCEDURE — 97535 SELF CARE MNGMENT TRAINING: CPT

## 2023-02-28 PROCEDURE — 82947 ASSAY GLUCOSE BLOOD QUANT: CPT

## 2023-02-28 PROCEDURE — 6370000000 HC RX 637 (ALT 250 FOR IP): Performed by: INTERNAL MEDICINE

## 2023-02-28 PROCEDURE — 2060000000 HC ICU INTERMEDIATE R&B

## 2023-02-28 PROCEDURE — 6360000002 HC RX W HCPCS: Performed by: INTERNAL MEDICINE

## 2023-02-28 RX ADMIN — SODIUM CHLORIDE, PRESERVATIVE FREE 10 ML: 5 INJECTION INTRAVENOUS at 08:44

## 2023-02-28 RX ADMIN — LACOSAMIDE 150 MG: 100 TABLET, FILM COATED ORAL at 08:31

## 2023-02-28 RX ADMIN — Medication 5 MG: at 20:32

## 2023-02-28 RX ADMIN — LAMOTRIGINE 125 MG: 100 TABLET ORAL at 20:32

## 2023-02-28 RX ADMIN — OXYCODONE HYDROCHLORIDE AND ACETAMINOPHEN 1 TABLET: 5; 325 TABLET ORAL at 09:50

## 2023-02-28 RX ADMIN — DESMOPRESSIN ACETATE 40 MG: 0.2 TABLET ORAL at 20:32

## 2023-02-28 RX ADMIN — PANTOPRAZOLE SODIUM 40 MG: 40 TABLET, DELAYED RELEASE ORAL at 08:30

## 2023-02-28 RX ADMIN — MORPHINE SULFATE 30 MG: 30 TABLET, FILM COATED, EXTENDED RELEASE ORAL at 20:33

## 2023-02-28 RX ADMIN — MORPHINE SULFATE 30 MG: 30 TABLET, FILM COATED, EXTENDED RELEASE ORAL at 08:42

## 2023-02-28 RX ADMIN — OXYCODONE HYDROCHLORIDE AND ACETAMINOPHEN 1 TABLET: 5; 325 TABLET ORAL at 14:54

## 2023-02-28 RX ADMIN — LEVETIRACETAM 1000 MG: 500 TABLET, FILM COATED ORAL at 20:32

## 2023-02-28 RX ADMIN — MORPHINE SULFATE 15 MG: 15 TABLET, FILM COATED, EXTENDED RELEASE ORAL at 08:37

## 2023-02-28 RX ADMIN — LEVETIRACETAM 1000 MG: 500 TABLET, FILM COATED ORAL at 08:30

## 2023-02-28 RX ADMIN — ENOXAPARIN SODIUM 40 MG: 100 INJECTION SUBCUTANEOUS at 08:30

## 2023-02-28 RX ADMIN — SODIUM CHLORIDE, PRESERVATIVE FREE 10 ML: 5 INJECTION INTRAVENOUS at 20:35

## 2023-02-28 RX ADMIN — LORAZEPAM 1 MG: 1 TABLET ORAL at 22:53

## 2023-02-28 RX ADMIN — MORPHINE SULFATE 15 MG: 15 TABLET, FILM COATED, EXTENDED RELEASE ORAL at 20:35

## 2023-02-28 RX ADMIN — ASPIRIN 81 MG: 81 TABLET, CHEWABLE ORAL at 08:30

## 2023-02-28 RX ADMIN — METOPROLOL TARTRATE 12.5 MG: 25 TABLET ORAL at 20:32

## 2023-02-28 RX ADMIN — DOCUSATE SODIUM 100 MG: 100 CAPSULE ORAL at 08:30

## 2023-02-28 RX ADMIN — POTASSIUM CHLORIDE 10 MEQ: 1500 TABLET, EXTENDED RELEASE ORAL at 16:26

## 2023-02-28 RX ADMIN — LAMOTRIGINE 125 MG: 100 TABLET ORAL at 08:30

## 2023-02-28 RX ADMIN — METOPROLOL TARTRATE 12.5 MG: 25 TABLET ORAL at 08:30

## 2023-02-28 RX ADMIN — SERTRALINE 100 MG: 50 TABLET, FILM COATED ORAL at 08:30

## 2023-02-28 RX ADMIN — LACOSAMIDE 150 MG: 100 TABLET, FILM COATED ORAL at 20:32

## 2023-02-28 RX ADMIN — OXYCODONE HYDROCHLORIDE AND ACETAMINOPHEN 1 TABLET: 5; 325 TABLET ORAL at 18:58

## 2023-02-28 RX ADMIN — POTASSIUM CHLORIDE 10 MEQ: 1500 TABLET, EXTENDED RELEASE ORAL at 09:50

## 2023-02-28 ASSESSMENT — PAIN DESCRIPTION - DESCRIPTORS
DESCRIPTORS: SHARP
DESCRIPTORS: ACHING
DESCRIPTORS: ACHING
DESCRIPTORS: SHARP
DESCRIPTORS: CRAMPING
DESCRIPTORS: CRAMPING
DESCRIPTORS: ACHING

## 2023-02-28 ASSESSMENT — PAIN SCALES - GENERAL
PAINLEVEL_OUTOF10: 8
PAINLEVEL_OUTOF10: 8
PAINLEVEL_OUTOF10: 5
PAINLEVEL_OUTOF10: 8
PAINLEVEL_OUTOF10: 5
PAINLEVEL_OUTOF10: 8
PAINLEVEL_OUTOF10: 8
PAINLEVEL_OUTOF10: 4

## 2023-02-28 ASSESSMENT — PAIN - FUNCTIONAL ASSESSMENT
PAIN_FUNCTIONAL_ASSESSMENT: ACTIVITIES ARE NOT PREVENTED

## 2023-02-28 ASSESSMENT — PAIN DESCRIPTION - LOCATION
LOCATION: BACK
LOCATION: ABDOMEN
LOCATION: ABDOMEN

## 2023-02-28 ASSESSMENT — PAIN DESCRIPTION - ORIENTATION
ORIENTATION: LOWER
ORIENTATION: LEFT
ORIENTATION: LOWER
ORIENTATION: LEFT

## 2023-02-28 ASSESSMENT — PAIN SCALES - WONG BAKER
WONGBAKER_NUMERICALRESPONSE: 4
WONGBAKER_NUMERICALRESPONSE: 4

## 2023-02-28 NOTE — PLAN OF CARE
Problem: Respiratory - Adult  Goal: Achieves optimal ventilation and oxygenation  2/28/2023 0453 by Fabrice Dai RCP  Flowsheets (Taken 2/28/2023 5811)  Achieves optimal ventilation and oxygenation:   Assess for changes in respiratory status   Position to facilitate oxygenation and minimize respiratory effort   Assess the need for suctioning and aspirate as needed   Respiratory therapy support as indicated   Assess for changes in mentation and behavior   Oxygen supplementation based on oxygen saturation or arterial blood gases  2/27/2023 2323 by Alex Ramos  Outcome: Progressing  2/27/2023 1800 by Dann Garcia RN  Outcome: Progressing

## 2023-02-28 NOTE — PLAN OF CARE
Problem: Discharge Planning  Goal: Discharge to home or other facility with appropriate resources  2/27/2023 2323 by Rosy Suresh  Outcome: Progressing  2/27/2023 1800 by Lilibeth Arnold RN  Outcome: Progressing     Problem: Pain  Goal: Verbalizes/displays adequate comfort level or baseline comfort level  2/27/2023 2323 by Rosy Suresh  Outcome: Progressing  2/27/2023 1800 by Lilibeth Arnold RN  Outcome: Progressing     Problem: Skin/Tissue Integrity  Goal: Absence of new skin breakdown  Description: 1. Monitor for areas of redness and/or skin breakdown  2. Assess vascular access sites hourly  3. Every 4-6 hours minimum:  Change oxygen saturation probe site  4. Every 4-6 hours:  If on nasal continuous positive airway pressure, respiratory therapy assess nares and determine need for appliance change or resting period.   2/27/2023 2323 by Rosy Suresh  Outcome: Progressing  2/27/2023 1800 by Lilibeth Arnold RN  Outcome: Progressing     Problem: Safety - Adult  Goal: Free from fall injury  2/27/2023 2323 by Rosy Suresh  Outcome: Progressing  2/27/2023 1800 by Lilibeth Arnold RN  Outcome: Progressing     Problem: Respiratory - Adult  Goal: Achieves optimal ventilation and oxygenation  2/27/2023 2323 by Rosy Suresh  Outcome: Progressing  2/27/2023 1800 by Lilibeth Arnold RN  Outcome: Progressing     Problem: Chronic Conditions and Co-morbidities  Goal: Patient's chronic conditions and co-morbidity symptoms are monitored and maintained or improved  2/27/2023 2323 by Rosy Suresh  Outcome: Progressing  2/27/2023 1800 by Lilibeth Arnold RN  Outcome: Progressing     Problem: ABCDS Injury Assessment  Goal: Absence of physical injury  2/27/2023 2323 by Rosy Suresh  Outcome: Progressing  2/27/2023 1800 by Lilibeth Arnold RN  Outcome: Progressing

## 2023-02-28 NOTE — CARE COORDINATION
Transitional planning-message from Susan-patient accepted-insurance company is asking for updates for last 48 hours. [phone] faxed progress notes and PT/OT to [phone] message from daughter Brigid Veras, [address], and [address] called daughter Lamin Juarez back and informed her that [facility] has accepted her mother and they started a precert with her insurance.  She says she is OK with [facility]

## 2023-02-28 NOTE — PLAN OF CARE
Ms. Joan Moctezuma c/o chronic pain in lower back. Pain addressed with Oxycodone as ordered. She has been incontinent of stool and has a purewick in place. She was up and ambulating with PT/OT today. VSS. Will continue to encourage patient to call before being incontinent.
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
## 2023-02-28 NOTE — PROGRESS NOTES
Occupational Therapy  Facility/Department: [facility]  Occupational Daily Treatment Note    Name: Constantino Hernandez  : 1965  MRN: [id number]  Date of Service: 2023    Discharge Recommendations:  Patient would benefit from continued therapy after discharge          Patient Diagnosis(es): The primary encounter diagnosis was Altered mental status, unspecified altered mental status type. Diagnoses of Malignant neoplasm of ovary, unspecified laterality ([facility] [address] and Cancer, metastatic to bone [facility]) were also pertinent to this visit. Past Medical History:  has a past medical history of Anemia, Bleeding, Calculus of gallbladder without cholecystitis without obstruction, Cervical cancer ([address] Depression, Diabetes mellitus ([address] GERD (gastroesophageal reflux disease), Hx of blood clots, Hypertension, Intestinal adhesions with partial obstruction ([address] Metastatic cancer ([address] Ovarian cancer ([address] Post chemo evaluation, PRES (posterior reversible encephalopathy syndrome), Somnolence, Splenic lesion, and Traumatic rhabdomyolysis ([address] Past Surgical History:  has a past surgical history that includes Hysterectomy, total abdominal; Port Surgery; Tonsillectomy; IR PORT PLACEMENT > 5 YEARS (2020); Anus surgery; Abscess Drainage (); colectomy (2013); IR EMBOLIZATION HEMORRHAGE (10/05/2020); and Cardiac catheterization. Assessment   Performance deficits / Impairments: Decreased functional mobility ; Decreased ADL status; Decreased strength;Decreased safe awareness;Decreased cognition;Decreased high-level IADLs;Decreased balance;Decreased endurance  Prognosis: Good  Activity Tolerance  Activity Tolerance: Treatment limited secondary to decreased cognition;Patient Tolerated treatment well        Plan   Occupational Therapy Plan  Times Per Week: 3-4x/wk  Current Treatment Recommendations: Balance training, Strengthening, Functional mobility training, Endurance training, Patient/Caregiver education & training, Safety education & training, Equipment evaluation, education, & procurement, Home management training, Self-Care / ADL, Coordination training, Cognitive reorientation     Restrictions  Restrictions/Precautions  Restrictions/Precautions: Fall Risk  Required Braces or Orthoses?: No  Pain assessment: Pt stated 3/10 pain in bottom/lower back  Pain intervention: Repositioned    Subjective   General  Chart Reviewed: Yes  Patient assessed for rehabilitation services?: Yes  Family / Caregiver Present: No            Safety Devices  Type of Devices: Gait belt;Left in bed;Call light within reach;Nurse notified; All fall risk precautions in place; Patient at risk for falls  Balance  Sitting: Without support (SUP static/dynamic sitting EOB/toilet)  Standing: With support (SBA static/dynamic standing EOB/toilet. Total time ~5 minutes)  Gait  Overall Level of Assistance: Setup; Additional time;Stand-by assistance (functional mobility to/from EOB to/from toilet)  Assistive Device: Will Ingael, rolling;Gait belt  Toilet Transfers  Toilet - Technique: Ambulating  Equipment Used: Standard toilet  Toilet Transfer: Stand by assistance  Toilet Transfers Comments: using RW/handrails     ADL  Toileting: Stand by assistance; Increased time to complete;Setup  Toileting Skilled Clinical Factors: CGA for clothing management seated/standing with RW and SBA for personal hygiene seated on toilet. Min A for toilet transfer with RW pt required mod verbal/tactile cues on proper hand placement  Additional Comments: Pt in bed upon arrival. Max encouragement to participate in therapy with good return. Pt transferred supine/sit using bedrails, sit/stand using RW. Pt completed functional mobility to bathroom to transfer to toilet. Pt required increased time for bottom care/brief management in standing. Pt c/o dizziness throughout session, no LOB noted. Pt exited bathroom to return seated EOB.  Pt denied further ADL care d/t breakfast arriving. Pt left in bed at end of session. Bed mobility  Supine to Sit: Contact guard assistance (d/t dizziness)  Sit to Supine: Stand by assistance  Scooting: Stand by assistance  Bed Mobility Comments: HOB elevated, using bed rails  Transfers  Sit to stand: Contact guard assistance  Stand to sit: Contact guard assistance  Transfer Comments: using RW     Cognition  Overall Cognitive Status: Exceptions  Arousal/Alertness: Delayed responses to stimuli  Following Commands: Follows one step commands with increased time  Attention Span: Attends with cues to redirect  Insights: Decreased awareness of deficits  Initiation: Requires cues for some  Sequencing: Requires cues for some  Orientation  Overall Orientation Status: Within Functional Limits                  Education Given To: Patient  Education Provided Comments: OT POC, importance of participation in therapy, transfer/walker training, safety awareness with fair return  Education Method: Verbal  Barriers to Learning: Cognition  Education Outcome: Continued education needed        AM-PAC Score   AM-Located within [facility] Inpatient Daily Activity Raw Score: 19 (02/28/23 1012)  AM-PAC Inpatient ADL T-Scale Score : 40.22 (02/28/23 1012)  ADL Inpatient CMS 0-100% Score: 42.8 (02/28/23 1012)  ADL Inpatient CMS G-Code Modifier : CK (02/28/23 1012)       Goals  Short Term Goals  Time Frame for Short Term Goals: Pt will, by discharge:  Short Term Goal 1: Perform ADL tasks with mod IND using AE/DME PRN  Short Term Goal 2: Perform functional transfers/functional mobility with mod IND using least restrictive AD  Short Term Goal 3:  Independently demo good safety awareness/during engagement in all ADLs and functional transfers/functional mobility  Short Term Goal 4: Demo 10+ minutes standing tolerance with use of least restrictive AD for increased participation in ADL/IADL tasks       Therapy Time   Individual Concurrent Group Co-treatment   Time In 0877         Time Out 1929 Minutes 32         Timed Code Treatment Minutes: [address]/T[place] NEFF/JOAQUÍN

## 2023-03-01 LAB
ANION GAP SERPL CALCULATED.3IONS-SCNC: 10 MMOL/L (ref 9–17)
BILIRUBIN URINE: NEGATIVE
BUN SERPL-MCNC: 12 MG/DL (ref 8–23)
CALCIUM SERPL-MCNC: 8.6 MG/DL (ref 8.6–10.4)
CASTS UA: NORMAL /LPF (ref 0–8)
CHLORIDE SERPL-SCNC: 100 MMOL/L (ref 98–107)
CO2 SERPL-SCNC: 25 MMOL/L (ref 20–31)
COLOR: ABNORMAL
CREAT SERPL-MCNC: 0.55 MG/DL (ref 0.5–0.9)
EPITHELIAL CELLS UA: NORMAL /HPF (ref 0–5)
GFR SERPL CREATININE-BSD FRML MDRD: >60 ML/MIN/1.73M2
GLUCOSE BLD-MCNC: 109 MG/DL (ref 65–105)
GLUCOSE BLD-MCNC: 154 MG/DL (ref 65–105)
GLUCOSE BLD-MCNC: 83 MG/DL (ref 65–105)
GLUCOSE BLD-MCNC: 96 MG/DL (ref 65–105)
GLUCOSE SERPL-MCNC: 94 MG/DL (ref 70–99)
GLUCOSE UR STRIP.AUTO-MCNC: NEGATIVE MG/DL
HCT VFR BLD AUTO: 34 % (ref 36.3–47.1)
HGB BLD-MCNC: 10.8 G/DL (ref 11.9–15.1)
KETONES UR STRIP.AUTO-MCNC: ABNORMAL MG/DL
LEUKOCYTE ESTERASE UR QL STRIP.AUTO: ABNORMAL
MCH RBC QN AUTO: 30.2 PG (ref 25.2–33.5)
MCHC RBC AUTO-ENTMCNC: 31.8 G/DL (ref 28.4–34.8)
MCV RBC AUTO: 95 FL (ref 82.6–102.9)
MICROORGANISM SPEC CULT: NORMAL
NITRITE UR QL STRIP.AUTO: POSITIVE
NRBC AUTOMATED: 0 PER 100 WBC
PDW BLD-RTO: 18.8 % (ref 11.8–14.4)
PLATELET # BLD AUTO: 341 K/UL (ref 138–453)
PMV BLD AUTO: 9.6 FL (ref 8.1–13.5)
POTASSIUM SERPL-SCNC: 4.2 MMOL/L (ref 3.7–5.3)
PROT UR STRIP.AUTO-MCNC: 6.5 MG/DL (ref 5–8)
PROT UR STRIP.AUTO-MCNC: ABNORMAL MG/DL
RBC # BLD: 3.58 M/UL (ref 3.95–5.11)
RBC CLUMPS #/AREA URNS AUTO: NORMAL /HPF (ref 0–4)
SODIUM SERPL-SCNC: 135 MMOL/L (ref 135–144)
SPECIFIC GRAVITY UA: 1.02 (ref 1–1.03)
SPECIMEN DESCRIPTION: NORMAL
TURBIDITY: CLEAR
URINE HGB: ABNORMAL
UROBILINOGEN, URINE: NORMAL
WBC # BLD AUTO: 7.3 K/UL (ref 3.5–11.3)
WBC UA: NORMAL /HPF (ref 0–5)

## 2023-03-01 PROCEDURE — 2580000003 HC RX 258: Performed by: INTERNAL MEDICINE

## 2023-03-01 PROCEDURE — 97110 THERAPEUTIC EXERCISES: CPT

## 2023-03-01 PROCEDURE — 81001 URINALYSIS AUTO W/SCOPE: CPT

## 2023-03-01 PROCEDURE — 82947 ASSAY GLUCOSE BLOOD QUANT: CPT

## 2023-03-01 PROCEDURE — 51798 US URINE CAPACITY MEASURE: CPT

## 2023-03-01 PROCEDURE — 2060000000 HC ICU INTERMEDIATE R&B

## 2023-03-01 PROCEDURE — 6370000000 HC RX 637 (ALT 250 FOR IP): Performed by: INTERNAL MEDICINE

## 2023-03-01 PROCEDURE — 80048 BASIC METABOLIC PNL TOTAL CA: CPT

## 2023-03-01 PROCEDURE — 6360000002 HC RX W HCPCS: Performed by: INTERNAL MEDICINE

## 2023-03-01 PROCEDURE — 85027 COMPLETE CBC AUTOMATED: CPT

## 2023-03-01 PROCEDURE — 99232 SBSQ HOSP IP/OBS MODERATE 35: CPT | Performed by: INTERNAL MEDICINE

## 2023-03-01 PROCEDURE — 6370000000 HC RX 637 (ALT 250 FOR IP): Performed by: FAMILY MEDICINE

## 2023-03-01 PROCEDURE — 36415 COLL VENOUS BLD VENIPUNCTURE: CPT

## 2023-03-01 PROCEDURE — 97116 GAIT TRAINING THERAPY: CPT

## 2023-03-01 RX ORDER — SULFAMETHOXAZOLE AND TRIMETHOPRIM 800; 160 MG/1; MG/1
1 TABLET ORAL EVERY 12 HOURS SCHEDULED
Status: COMPLETED | OUTPATIENT
Start: 2023-03-01 | End: 2023-03-04

## 2023-03-01 RX ADMIN — LACOSAMIDE 150 MG: 100 TABLET, FILM COATED ORAL at 08:02

## 2023-03-01 RX ADMIN — SODIUM CHLORIDE, PRESERVATIVE FREE 10 ML: 5 INJECTION INTRAVENOUS at 08:02

## 2023-03-01 RX ADMIN — LORAZEPAM 1 MG: 1 TABLET ORAL at 17:36

## 2023-03-01 RX ADMIN — LORAZEPAM 1 MG: 1 TABLET ORAL at 08:08

## 2023-03-01 RX ADMIN — MORPHINE SULFATE 15 MG: 15 TABLET, FILM COATED, EXTENDED RELEASE ORAL at 08:07

## 2023-03-01 RX ADMIN — LAMOTRIGINE 125 MG: 100 TABLET ORAL at 20:45

## 2023-03-01 RX ADMIN — SERTRALINE 100 MG: 50 TABLET, FILM COATED ORAL at 08:01

## 2023-03-01 RX ADMIN — Medication 5 MG: at 20:45

## 2023-03-01 RX ADMIN — PANTOPRAZOLE SODIUM 40 MG: 40 TABLET, DELAYED RELEASE ORAL at 08:01

## 2023-03-01 RX ADMIN — SODIUM CHLORIDE, PRESERVATIVE FREE 10 ML: 5 INJECTION INTRAVENOUS at 20:51

## 2023-03-01 RX ADMIN — MORPHINE SULFATE 30 MG: 30 TABLET, FILM COATED, EXTENDED RELEASE ORAL at 08:08

## 2023-03-01 RX ADMIN — OXYCODONE HYDROCHLORIDE AND ACETAMINOPHEN 1 TABLET: 5; 325 TABLET ORAL at 17:36

## 2023-03-01 RX ADMIN — OXYCODONE HYDROCHLORIDE AND ACETAMINOPHEN 1 TABLET: 5; 325 TABLET ORAL at 01:51

## 2023-03-01 RX ADMIN — MORPHINE SULFATE 15 MG: 15 TABLET, FILM COATED, EXTENDED RELEASE ORAL at 20:51

## 2023-03-01 RX ADMIN — POTASSIUM CHLORIDE 10 MEQ: 1500 TABLET, EXTENDED RELEASE ORAL at 08:01

## 2023-03-01 RX ADMIN — METOPROLOL TARTRATE 12.5 MG: 25 TABLET ORAL at 20:50

## 2023-03-01 RX ADMIN — DOCUSATE SODIUM 100 MG: 100 CAPSULE ORAL at 08:03

## 2023-03-01 RX ADMIN — ENOXAPARIN SODIUM 40 MG: 100 INJECTION SUBCUTANEOUS at 08:02

## 2023-03-01 RX ADMIN — SENNOSIDES 8.6 MG: 8.6 TABLET, COATED ORAL at 20:45

## 2023-03-01 RX ADMIN — LACOSAMIDE 150 MG: 100 TABLET, FILM COATED ORAL at 20:45

## 2023-03-01 RX ADMIN — POTASSIUM CHLORIDE 10 MEQ: 1500 TABLET, EXTENDED RELEASE ORAL at 17:37

## 2023-03-01 RX ADMIN — METOPROLOL TARTRATE 12.5 MG: 25 TABLET ORAL at 08:02

## 2023-03-01 RX ADMIN — LEVETIRACETAM 1000 MG: 500 TABLET, FILM COATED ORAL at 20:45

## 2023-03-01 RX ADMIN — DESMOPRESSIN ACETATE 40 MG: 0.2 TABLET ORAL at 20:45

## 2023-03-01 RX ADMIN — SULFAMETHOXAZOLE AND TRIMETHOPRIM 1 TABLET: 800; 160 TABLET ORAL at 20:45

## 2023-03-01 RX ADMIN — ASPIRIN 81 MG: 81 TABLET, CHEWABLE ORAL at 08:01

## 2023-03-01 RX ADMIN — LAMOTRIGINE 125 MG: 100 TABLET ORAL at 08:01

## 2023-03-01 RX ADMIN — DOCUSATE SODIUM 100 MG: 100 CAPSULE ORAL at 20:45

## 2023-03-01 RX ADMIN — LEVETIRACETAM 1000 MG: 500 TABLET, FILM COATED ORAL at 08:01

## 2023-03-01 RX ADMIN — MORPHINE SULFATE 30 MG: 30 TABLET, FILM COATED, EXTENDED RELEASE ORAL at 20:46

## 2023-03-01 ASSESSMENT — PAIN DESCRIPTION - LOCATION
LOCATION: BACK
LOCATION: BACK

## 2023-03-01 ASSESSMENT — PAIN SCALES - GENERAL
PAINLEVEL_OUTOF10: 7
PAINLEVEL_OUTOF10: 8

## 2023-03-01 ASSESSMENT — PAIN DESCRIPTION - DESCRIPTORS: DESCRIPTORS: ACHING

## 2023-03-01 ASSESSMENT — PAIN SCALES - WONG BAKER: WONGBAKER_NUMERICALRESPONSE: 0

## 2023-03-01 NOTE — PROGRESS NOTES
Physical Therapy  Facility/Department: [facility]  Daily Treatment note     Name: Dejuan Hunter  : 1965  MRN: [id number]  Date of Service: 3/1/2023    Discharge Recommendations:  Patient would benefit from continued therapy after discharge   PT Equipment Recommendations  Equipment Needed: No      Patient Diagnosis(es): The primary encounter diagnosis was Altered mental status, unspecified altered mental status type. Diagnoses of Malignant neoplasm of ovary, unspecified laterality ([facility] [address] and Cancer, metastatic to bone [facility]) were also pertinent to this visit. Past Medical History:  has a past medical history of Anemia, Bleeding, Calculus of gallbladder without cholecystitis without obstruction, Cervical cancer ([facility] [address] Depression, Diabetes mellitus ([address] GERD (gastroesophageal reflux disease), Hx of blood clots, Hypertension, Intestinal adhesions with partial obstruction ([address] Metastatic cancer ([address] Ovarian cancer ([address] Post chemo evaluation, PRES (posterior reversible encephalopathy syndrome), Somnolence, Splenic lesion, and Traumatic rhabdomyolysis ([facility] [address] Past Surgical History:  has a past surgical history that includes Hysterectomy, total abdominal; Port Surgery; Tonsillectomy; IR PORT PLACEMENT > 5 YEARS (2020); Anus surgery; Abscess Drainage (); colectomy (2013); IR EMBOLIZATION HEMORRHAGE (10/05/2020); and Cardiac catheterization. Assessment   Body Structures, Functions, Activity Limitations Requiring Skilled Therapeutic Intervention: Decreased functional mobility ; Decreased endurance; Increased pain;Decreased balance;Decreased safe awareness  Assessment: The pt demo progress with mobility th date, able to amb 100ft with RW and CG A for safety pt demonstarted decrease endurance, limited by weakness fatigue and dizziness. . Recommend continued PT to address deficits and maximize safety.   Therapy Prognosis: Fair  Requires PT Follow-Up: Yes  Activity Tolerance  Activity Tolerance: Patient limited by endurance; Patient limited by fatigue     Plan   Physcial Therapy Plan  General Plan: 3-5 times per week  Current Treatment Recommendations: Strengthening, ROM, Balance training, Functional mobility training, Transfer training, Endurance training, Gait training, Stair training, Home exercise program, Safety education & training, Therapeutic activities, Patient/Caregiver education & training  Safety Devices  Type of Devices: Gait belt, Left in bed, Call light within reach, Nurse notified, All fall risk precautions in place, Patient at risk for falls  Restraints  Restraints Initially in Place: No     Restrictions  Restrictions/Precautions  Restrictions/Precautions: Fall Risk  Required Braces or Orthoses?: No  Position Activity Restriction  Other position/activity restrictions: 6/10 abdominal pain, pt. held pillow during movement, and re positioning provided. Subjective   General  Patient assessed for rehabilitation services?: Yes  Response To Previous Treatment: Patient with no complaints from previous session.   Family / Caregiver Present: No  Follows Commands: Within Functional Limits  Subjective  Subjective: Pt supine in bed upon arrival , pleasnat and cooperatiove t/o, denies pain   Cognition   Orientation  Overall Orientation Status: Within Functional Limits  Cognition  Initiation: Requires cues for all  Sequencing: Requires cues for some  Cognition Comment: impulsive, flat affect     Objective   Bed mobility  Supine to Sit: Contact guard assistance  Sit to Supine: Unable to assess (Pt retired to chair)  Scooting: Stand by assistance  Bed Mobility Comments: HOB elevated, utilizing bed rails  Transfers  Sit to Stand: Contact guard assistance  Stand to Sit: Contact guard assistance  Bed to Chair: Contact guard assistance  Comment: Transfer with RW  Ambulation  Surface: Level tile  Device: Rolling Walker  Assistance: Contact guard assistance  Quality of Gait: unsteady, with no LOB noted  Gait Deviations: Slow Cynthia;Decreased step length;Decreased step height; Increased TAWANDA  Distance: 100ft  Comments: Limited by weakness fatigue and decrease endurance. More Ambulation?: No     Balance  Posture: Fair  Sitting - Static: Good;-  Sitting - Dynamic: Fair;+  Standing - Static: Fair;+  Standing - Dynamic: Fair  Comments: standing balance assessed with RW  Exercise Treatment:Seated LE exercise program: Long Arc Quads, hip abduction/adduction, heel/toe raises, and marches.  Reps: x15    AM-PAC Score  AM-PAC Inpatient Mobility Raw Score : 17 (02/22/23 1602)  AM-PAC Inpatient T-Scale Score : 42.13 (02/22/23 1602)  Mobility Inpatient CMS 0-100% Score: 50.57 (02/22/23 1602)  Mobility Inpatient CMS G-Code Modifier : CK (02/22/23 1602)   Goals  Short Term Goals  Time Frame for Short Term Goals: 14 visits  Short Term Goal 1: Perform bed mobility and functional transfers independently  Short Term Goal 2: Ambulate 200ft with RW and supervision  Short Term Goal 3: Demo Good- dynamic standing balance to decrease risk of falls       Education  Patient Education  Education Given To: Patient  Education Provided: Role of Therapy;Plan of Care;Transfer Training;Home Exercise Program  Education Provided Comments: Pt educated on HEP for strenthening  Education Method: Verbal  Barriers to Learning: Cognition  Education Outcome: Continued education needed;Verbalized understanding      Therapy Time   Individual Concurrent Group Co-treatment   Time In 1134         Time Out 1200         Minutes [address] [place] [address]

## 2023-03-01 NOTE — PROGRESS NOTES
Urine pink tinged- made attn aware- order Urine Cx placed. Waiting for [facility]    Bladder Scanned = PVR 0  Will get serial urine     R chest port dressing changed and documented.
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
## 2023-03-01 NOTE — PLAN OF CARE
Problem: Discharge Planning  Goal: Discharge to home or other facility with appropriate resources  3/1/2023 0244 by Genaro Márquez RN  Outcome: Progressing     Problem: Pain  Goal: Verbalizes/displays adequate comfort level or baseline comfort level  3/1/2023 0244 by Genaro Márquez RN  Outcome: Progressing     Problem: Skin/Tissue Integrity  Goal: Absence of new skin breakdown  Description: 1. Monitor for areas of redness and/or skin breakdown  2. Assess vascular access sites hourly  3. Every 4-6 hours minimum:  Change oxygen saturation probe site  4. Every 4-6 hours:  If on nasal continuous positive airway pressure, respiratory therapy assess nares and determine need for appliance change or resting period.   3/1/2023 0244 by Genaro Márquez RN  Outcome: Progressing     Problem: Safety - Adult  Goal: Free from fall injury  3/1/2023 0244 by Genaro Márquez RN  Outcome: Progressing     Problem: Respiratory - Adult  Goal: Achieves optimal ventilation and oxygenation  3/1/2023 0244 by Genaro Márquez RN  Outcome: Progressing     Problem: Chronic Conditions and Co-morbidities  Goal: Patient's chronic conditions and co-morbidity symptoms are monitored and maintained or improved  3/1/2023 0244 by Genaro Márquez RN  Outcome: Progressing     Problem: ABCDS Injury Assessment  Goal: Absence of physical injury  3/1/2023 0244 by Genaro Márquez RN  Outcome: Progressing

## 2023-03-01 NOTE — CONSULTS
Malik Mckeon, [address] [facility], [address] [place] [address], [address], & [place]   Urology Consultation      Patient:  Cong Riley  MRN: [id number]  YOB: 1965    CHIEF COMPLAINT:  Hematuria    HISTORY OF PRESENT ILLNESS:   The patient is a 59 y.o. female who presents with PMH including metastatic ovarian cancer [facility] e2005. She was admitted about 9 days ago due to concern of seizure like activity. EEG shoed encephalopathy. She also underwent CT a/p that showed air fluid levels and large stool burden. No Gu abnormalities on CT. Urology was consulted today due to new hematuria. She is on ASA for elevated Troponins (patient refusing further workup). No urine saved, but per her RN her urine has been pink tinged. Patient denies any history of hematuria, denies history of kidney stones, flank pain, UTIs, dysuria. She had a urine culture positive for Staph 10-50K CFUs. Bactrim started today. Repeat Urine culture sent. She is a PPD daily smoker for several years. No family history of  malignancies. She is awaiting placement at facility. Patient's old records, notes and chart reviewed and summarized above. Past Medical History:    Past Medical History:   Diagnosis Date    Anemia     Bleeding 10/2020    intra-abdominal bleeding -due to splenic mass with GI infiltration. Status post embolization    Calculus of gallbladder without cholecystitis without obstruction 2/20/2023    Cervical cancer ([address]     Depression     Diabetes mellitus ([address]     GERD (gastroesophageal reflux disease)     Hx of blood clots     Hypertension     Intestinal adhesions with partial obstruction ([address] 2/20/2023    Multiple air-fluid levels and distended colon, excess stools    Metastatic cancer ([address] 10/2020    extensive intraabdominal and splenic involvement and lung mets.     Ovarian cancer ([address]     low grade serous ovarian carcinoma    Post chemo evaluation     2007: Chemo via med onc (Dr. Manjeet Aparicio), 2008: Chemo due to rising CA-125, 2013: intraperitoneal chemo,12/2015: Ca125 - 25     PRES (posterior reversible encephalopathy syndrome)     Somnolence 2/20/2023    Splenic lesion     Traumatic rhabdomyolysis ([facility] 2/20/2023     seizure       Past Surgical History:    Past Surgical History:   Procedure Laterality Date    ABSCESS DRAINAGE  2013    Franca rectal    ANUS SURGERY      ANAL FISSURECTOMY    CARDIAC CATHETERIZATION      COLECTOMY  03/2013    ex lap, tumor debulking, transverse colectomy w reanastamosis, subgastric omentectomy, intraperitoneal port placement    HYSTERECTOMY, TOTAL ABDOMINAL (CERVIX REMOVED)      IR EMBOLIZATION HEMORRHAGE  10/05/2020    intra-abdominal bleeding -due to splenic mass with GI infiltration. Status post embolization [employer] interlock coils x7. mri condtional 3t ok, safe immediately post implant.     IR PORT PLACEMENT EQUAL OR GREATER THAN 5 YEARS  08/24/2020    IR PORT PLACEMENT EQUAL OR GREATER THAN 5 YEARS 8/24/2020 Demarco Gerber MD [facility]    PORT SURGERY      IP Port    TONSILLECTOMY         Medications:      Current Facility-Administered Medications:     sulfamethoxazole-trimethoprim (BACTRIM DS;SEPTRA DS) 800-160 MG per tablet 1 tablet, 1 tablet, Oral, 2 times per day, Sachi Schumacher MD    aspirin chewable tablet 81 mg, 81 mg, Oral, Daily, Heriberto Torres MD, 81 mg at 03/01/23 0801    metoprolol tartrate (LOPRESSOR) tablet 12.5 mg, 12.5 mg, Oral, BID, Heriberto Torres MD, 12.5 mg at 03/01/23 0802    atorvastatin (LIPITOR) tablet 40 mg, 40 mg, Oral, Nightly, Heriberto Torres MD, 40 mg at 02/28/23 2032    melatonin tablet 5 mg, 5 mg, Oral, Nightly, Heriberto Torres MD, 5 mg at 02/28/23 2032    LORazepam (ATIVAN) tablet 1 mg, 1 mg, Oral, Q8H PRN, Heriberto Torres MD, 1 mg at 03/01/23 0808    morphine (MS CONTIN) extended release tablet 30 mg, 30 mg, Oral, 2 times per day, Heriberto Torres MD, 30 mg at 03/01/23 0808    pantoprazole (PROTONIX) tablet 40 mg, 40 mg, Oral, Daily, Heriberto Torres MD, 40 mg at 03/01/23 0801    potassium chloride (KLOR-CON M) extended release tablet 10 mEq, 10 mEq, Oral, BID WC, Jovan Barnett MD, 10 mEq at 03/01/23 0801    sertraline (ZOLOFT) tablet 100 mg, 100 mg, Oral, Daily, Jovan Barnett MD, 100 mg at 03/01/23 0801    docusate sodium (COLACE) capsule 100 mg, 100 mg, Oral, BID, Jovan Barnett MD, 100 mg at 03/01/23 0803    meclizine (ANTIVERT) tablet 12.5 mg, 12.5 mg, Oral, TID PRN, Jovan Barnett MD, 12.5 mg at 02/27/23 0830    senna (SENOKOT) tablet 8.6 mg, 1 tablet, Oral, Nightly, Jovan Barnett MD, 8.6 mg at 02/27/23 2009    insulin lispro (HUMALOG) injection vial 0-4 Units, 0-4 Units, SubCUTAneous, 4x Daily AC & HS, Jovan Barnett MD    calcium carbonate (TUMS) chewable tablet 500 mg, 500 mg, Oral, TID PRN, Jovan Barnett MD    glucose chewable tablet 16 g, 4 tablet, Oral, PRN, Mary Lou Vargas MD    dextrose bolus 10% 125 mL, 125 mL, IntraVENous, PRN **OR** dextrose bolus 10% 250 mL, 250 mL, IntraVENous, PRN, Mary Lou Vargas MD    glucagon (rDNA) injection 1 mg, 1 mg, SubCUTAneous, PRN, Mary Lou Vargas MD    dextrose 10 % infusion, , IntraVENous, Continuous PRN, Mary Lou Vargas MD    sodium chloride flush 0.9 % injection 5-40 mL, 5-40 mL, IntraVENous, 2 times per day, Mary Lou Vargas MD, 10 mL at 03/01/23 0802    sodium chloride flush 0.9 % injection 5-40 mL, 5-40 mL, IntraVENous, PRN, Mary Lou Vargas MD    0.9 % sodium chloride infusion, , IntraVENous, PRN, Mary Lou Vargas MD, Stopped at 02/25/23 0629    enoxaparin (LOVENOX) injection 40 mg, 40 mg, SubCUTAneous, Daily, Mary Lou Vargas MD, 40 mg at 03/01/23 0802    ondansetron (ZOFRAN-ODT) disintegrating tablet 4 mg, 4 mg, Oral, Q8H PRN, 4 mg at 02/22/23 1210 **OR** ondansetron (ZOFRAN) injection 4 mg, 4 mg, IntraVENous, Q6H PRN, Mary Lou Vargas MD, 4 mg at 02/21/23 0414    acetaminophen (TYLENOL) tablet 650 mg, 650 mg, Oral, Q6H PRN, 650 mg at 02/27/23 0253 **OR** acetaminophen (TYLENOL)  suppository 650 mg, 650 mg, Rectal, Q6H PRN, Se Paul MD    lamoTRIgine (LAMICTAL) tablet 125 mg, 125 mg, Oral, BID, Se Paul MD, 125 mg at 03/01/23 0801    lacosamide (VIMPAT) tablet 150 mg, 150 mg, Oral, BID, Se Paul MD, 150 mg at 03/01/23 0802    levETIRAcetam (KEPPRA) tablet 1,000 mg, 1,000 mg, Oral, BID, Se Paul MD, 1,000 mg at 03/01/23 0801    magnesium sulfate 1000 mg in dextrose 5% 100 mL IVPB, 1,000 mg, IntraVENous, PRN, Rebeca Litten Orlop, DO, Stopped at 02/24/23 1457    potassium chloride (KLOR-CON M) extended release tablet 40 mEq, 40 mEq, Oral, PRN, 40 mEq at 02/21/23 0139 **OR** potassium bicarb-citric acid (EFFER-K) effervescent tablet 40 mEq, 40 mEq, Oral, PRN **OR** potassium chloride 10 mEq/100 mL IVPB (Peripheral Line), 10 mEq, IntraVENous, PRN, Rebeca Litten Orlop, DO, Last Rate: 100 mL/hr at 02/20/23 1744, 10 mEq at 02/20/23 1744    morphine (MS CONTIN) extended release tablet 15 mg, 15 mg, Oral, BID, Landis Litten Orlop, DO, 15 mg at 03/01/23 0807    oxyCODONE-acetaminophen (PERCOCET) 5-325 MG per tablet 1 tablet, 1 tablet, Oral, Q4H PRN, Rebeca Litten Orlop, DO, 1 tablet at 03/01/23 0151    Allergies:    Allergies   Allergen Reactions    Carboplatin Anaphylaxis    Ceftriaxone Rash     Had a rash on ceftriaxone December 2020, [facility]       Social History:   Smoking: Daily PPD  Social History     Socioeconomic History    Marital status: Single     Spouse name: Not on file    Number of children: Not on file    Years of education: Not on file    Highest education level: Not on file   Occupational History    Not on file   Tobacco Use    Smoking status: Every Day     Packs/day: 1.00     Years: 20.00     Pack years: 20.00     Types: Cigarettes    Smokeless tobacco: Never   Vaping Use    Vaping Use: Never used   Substance and Sexual Activity    Alcohol use: Not Currently    Drug use: Never    Sexual activity: Not on file   Other Topics Concern    Not on file Social History Narrative    Not on file     Social Determinants of Health     Financial Resource Strain: Medium Risk    Difficulty of Paying Living Expenses: Somewhat hard   Food Insecurity: Food Insecurity Present    Worried About Running Out of Food in the Last Year: Often true    Ran Out of Food in the Last Year: Sometimes true   Transportation Needs: Not on file   Physical Activity: Not on file   Stress: Not on file   Social Connections: Not on file   Intimate Partner Violence: Not on file   Housing Stability: Not on file       Family History:    Family History   Problem Relation Age of Onset    Alcohol Abuse Mother     Cirrhosis Mother      Physical Exam:      This a 59 y.o. female   Vitals:    03/01/23 1159   BP: 110/73   Pulse:    Resp:    Temp: 98.6 °F (37 °C)   SpO2: 94%       Constitutional: Patient in no acute distress  Neuro: Alert and oriented to person place and time   Psych: Mood and affect normal  Head: Atraumatic and normocephalic  Neck: Trachea midline  Lungs: Respiratory effort normal  Cardiovascular:  Regular rhythm  Abdomen: Soft, non-tender, non-distended. CVA tenderness none  Bladder: Non-tender and not distended  Ext: 2+ DP pulses bilaterally  Skin: No rashes or bruising present  Lymphatics: No palpable lymphadenopathy  Pelvic exam: Deferred  Rectal exam: Not indicated    Labs:  No results for input(s): WBC, HGB, HCT, MCV, PLT in the last 72 hours. No results for input(s): NA, K, CL, CO2, PHOS, BUN, CREATININE, CA in the last 72 hours.     Recent Labs     02/27/23  1621   COLORU Red*   PHUR 5.5   WBCUA 10 TO 20   RBCUA 10 TO 20   SPECGRAV 1.036*   LEUKOCYTESUR SMALL*   UROBILINOGEN Normal   BILIRUBINUR NEGATIVE       Culture results:  Ucx 2/27: Toma 10-50cfu    -----------------------------------------------------------------  Imaging Results:      CT ABDOMEN PELVIS W IV CONTRAST Additional Contrast? None    Result Date: 2/19/2023  EXAMINATION: CT OF THE ABDOMEN AND PELVIS WITH CONTRAST 2/19/2023 8:57 pm TECHNIQUE: CT of the abdomen and pelvis was performed with the administration of intravenous contrast. Multiplanar reformatted images are provided for review. Automated exposure control, iterative reconstruction, and/or weight based adjustment of the mA/kV was utilized to reduce the radiation dose to as low as reasonably achievable. COMPARISON: October 3, 2022 CT CT abdomen and pelvis HISTORY: ORDERING SYSTEM PROVIDED HISTORY: evaluate for mets to abd, ovarian cancer stage 4 with peritoneal carcinomatosis, on chemo, ams TECHNOLOGIST PROVIDED HISTORY: evaluate for mets to abd, ovarian cancer stage 4 with peritoneal carcinomatosis, on chemo, ams Decision Support Exception - unselect if not a suspected or confirmed emergency medical condition->Emergency Medical Condition (MA) Reason for Exam: evaluate for mets to abd, ovarian cancer stage 4 with peritoneal carcinomatosis, on chemo, ams FINDINGS: Lower Chest: Multiple pulmonary nodules. Coronary artery disease. Organs: Superficial anterior abdominal wall varicosities. The liver,, pancreas, and adrenals appear normal.  Coarse calcifications noted within the spleen. Large gallstone noted. Kidneys appear normal. The bladder appears normal. GI/Bowel: The stomach,small bowel, appear normal. Increased stool throughout the colon. Multiple air-fluid levels and distended colon. Appendix not identified. Pelvis: Complex partially calcified pelvic mass again noted. This appears essentially unchanged in size and configuration allowing for difference in technique. Overall measurements are proximally 25 x 36 mm in transverse dimensions. . Peritoneum/Retroperitoneum: The abdominal aorta and iliac arteries are normal in caliber. There is no pathologic adenopathy. Calcified plaque along the aorta and its branches. Prominent retroperitoneal lymphadenopathy again noted many of which are calcified. This appears unchanged.  Bones/Soft Tissues: Multiple osteoblastic lesions throughout the spine and pelvis. Increased stool. Colonic ileus and probable nonspecific diarrheal disease. Stable calcified pelvic mass and retroperitoneal calcified lymph nodes. Stable osteoblastic metastatic disease. Stable multiple pulmonary nodules. Cholelithiasis. Multiple pulmonary nodules consistent with metastatic disease.        Assessment and Plan   Impression:  60 yo female with Metastaic ovarian Cancer, Encephalopathy, elevated troponins, concern for seizure     problem list -   Hematuria    Patient Active Problem List   Diagnosis    Epithelial ovarian cancer, FIGO stage OSIRIS ([facility]    Goals of care, counseling/discussion    Anemia    Splenic abscess    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 ([facility]    Acute encephalopathy    PRES (posterior reversible encephalopathy syndrome)    Hemianopia, bitemporal    Encephalopathy    Seizure disorder, status epilepticus, nonconvulsive (HCC)    History of ovarian cancer    Pleural effusion    Bandemia    Cancer, metastatic to bone (HCC)    Intractable low back pain    Fatigue    Chronic deep vein thrombosis (DVT) of femoral vein of left lower extremity (HCC)    Iron deficiency anemia secondary to inadequate dietary iron intake    Iron malabsorption    Malignant neoplasm of ovary (HCC)    Thrombocytosis    History of deep venous thrombosis (DVT) of distal vein of left lower extremity: On Eliquis    Pelvic mass    Acute on chronic anemia    AMS (altered mental status)    Lactic acidosis    Anxiety    Diabetes mellitus (HCC)    Gastroesophageal reflux disease    Recurrent major depressive disorder, in partial remission (HCC)    Ileus (HCC)    Pericardial effusion    Hypokalemia    Acute respiratory failure with hypoxia (HCC)    Metabolic encephalopathy    Delirium due to another medical condition    Urinary tract infection without hematuria    Seizure disorder (HCC)    Leg swelling    Iron deficiency    Drug-induced constipation    Septicemia ([facility] Hallucinations    Cerebrovascular accident (CVA) ([facility]    Difficulty with speech    MDD (major depressive disorder), recurrent severe, without psychosis ([address]    Seizure ([address]    Somnolence    Calculus of gallbladder without cholecystitis without obstruction    Intestinal adhesions with partial obstruction (HCC)    Traumatic rhabdomyolysis ([facility]       Plan:   -Serial urines - please keep some of each void in specimen cup to follow progression of hematuria  -Check PVR - bladder scan after voiding to ensure patient adequately emptying bladder  -Check CBC/BMP for current hgb and Cr levels  -Started on Bactrim per last urine culture. Agree with recheck culture  -CT A/P negative for acute  abnormalities  -Will need cystoscopy as outpatient - discussed with patient and she would be agreeable  -Encouraged smoking cessation  -If urine pink tinged, ok to continue daily ASA    Thank you for involving us in the care of [address] [place] [address]. Should you have any questions, please do not hesitate to contact us at any time.     Josie Fofana PA-C  Urology Service   2:18 PM 3/1/2023

## 2023-03-01 NOTE — PLAN OF CARE
Problem: Discharge Planning  Goal: Discharge to home or other facility with appropriate resources  3/1/2023 1329 by Meenu Hilton RN  Outcome: Progressing  3/1/2023 0244 by Arun Allen RN  Outcome: Progressing     Problem: Pain  Goal: Verbalizes/displays adequate comfort level or baseline comfort level  3/1/2023 1329 by Meenu Hilton RN  Outcome: Progressing  Flowsheets (Taken 3/1/2023 0747)  Verbalizes/displays adequate comfort level or baseline comfort level: Encourage patient to monitor pain and request assistance  3/1/2023 0244 by Arun Allen RN  Outcome: Progressing     Problem: Skin/Tissue Integrity  Goal: Absence of new skin breakdown  Description: 1. Monitor for areas of redness and/or skin breakdown  2. Assess vascular access sites hourly  3. Every 4-6 hours minimum:  Change oxygen saturation probe site  4. Every 4-6 hours:  If on nasal continuous positive airway pressure, respiratory therapy assess nares and determine need for appliance change or resting period.   3/1/2023 1329 by Meenu Hitlon RN  Outcome: Progressing  3/1/2023 0244 by Arun Allen RN  Outcome: Progressing     Problem: Safety - Adult  Goal: Free from fall injury  3/1/2023 1329 by Meenu Hilton RN  Outcome: Progressing  3/1/2023 0244 by Arun Allen RN  Outcome: Progressing     Problem: Respiratory - Adult  Goal: Achieves optimal ventilation and oxygenation  3/1/2023 1329 by Meenu Hilton RN  Outcome: Progressing  3/1/2023 0244 by Arun Allen RN  Outcome: Progressing     Problem: Chronic Conditions and Co-morbidities  Goal: Patient's chronic conditions and co-morbidity symptoms are monitored and maintained or improved  3/1/2023 1329 by Meenu Hilton RN  Outcome: Progressing  3/1/2023 0244 by Arun Allen RN  Outcome: Progressing     Problem: ABCDS Injury Assessment  Goal: Absence of physical injury  3/1/2023 1329 by Meenu Hilton RN  Outcome: Progressing  3/1/2023 0244 by Zheng Trujillo Brittnee Hernandez, RN  Outcome: Progressing

## 2023-03-02 LAB
GLUCOSE BLD-MCNC: 102 MG/DL (ref 65–105)
GLUCOSE BLD-MCNC: 107 MG/DL (ref 65–105)
GLUCOSE BLD-MCNC: 128 MG/DL (ref 65–105)
GLUCOSE BLD-MCNC: 132 MG/DL (ref 65–105)

## 2023-03-02 PROCEDURE — 6370000000 HC RX 637 (ALT 250 FOR IP): Performed by: INTERNAL MEDICINE

## 2023-03-02 PROCEDURE — 82947 ASSAY GLUCOSE BLOOD QUANT: CPT

## 2023-03-02 PROCEDURE — 99232 SBSQ HOSP IP/OBS MODERATE 35: CPT | Performed by: INTERNAL MEDICINE

## 2023-03-02 PROCEDURE — 2580000003 HC RX 258: Performed by: INTERNAL MEDICINE

## 2023-03-02 PROCEDURE — 6360000002 HC RX W HCPCS: Performed by: INTERNAL MEDICINE

## 2023-03-02 PROCEDURE — 2060000000 HC ICU INTERMEDIATE R&B

## 2023-03-02 PROCEDURE — 97535 SELF CARE MNGMENT TRAINING: CPT

## 2023-03-02 PROCEDURE — 6370000000 HC RX 637 (ALT 250 FOR IP): Performed by: FAMILY MEDICINE

## 2023-03-02 RX ORDER — ATORVASTATIN CALCIUM 40 MG/1
40 TABLET, FILM COATED ORAL NIGHTLY
Qty: 30 TABLET | Refills: 3 | DISCHARGE
Start: 2023-03-02 | End: 2023-03-05 | Stop reason: SDUPTHER

## 2023-03-02 RX ORDER — SENNA AND DOCUSATE SODIUM 50; 8.6 MG/1; MG/1
2 TABLET, FILM COATED ORAL DAILY
Status: DISCONTINUED | OUTPATIENT
Start: 2023-03-02 | End: 2023-03-06 | Stop reason: HOSPADM

## 2023-03-02 RX ORDER — SENNA AND DOCUSATE SODIUM 50; 8.6 MG/1; MG/1
2 TABLET, FILM COATED ORAL DAILY
DISCHARGE
Start: 2023-03-02

## 2023-03-02 RX ORDER — LIDOCAINE 4 G/G
1 PATCH TOPICAL DAILY
Status: DISCONTINUED | OUTPATIENT
Start: 2023-03-02 | End: 2023-03-06 | Stop reason: HOSPADM

## 2023-03-02 RX ORDER — ASPIRIN 81 MG/1
81 TABLET, CHEWABLE ORAL DAILY
Qty: 30 TABLET | Refills: 3 | DISCHARGE
Start: 2023-03-03 | End: 2023-03-05 | Stop reason: SDUPTHER

## 2023-03-02 RX ADMIN — METOPROLOL TARTRATE 12.5 MG: 25 TABLET ORAL at 08:58

## 2023-03-02 RX ADMIN — MORPHINE SULFATE 15 MG: 15 TABLET, FILM COATED, EXTENDED RELEASE ORAL at 21:36

## 2023-03-02 RX ADMIN — OXYCODONE HYDROCHLORIDE AND ACETAMINOPHEN 1 TABLET: 5; 325 TABLET ORAL at 11:02

## 2023-03-02 RX ADMIN — LACOSAMIDE 150 MG: 100 TABLET, FILM COATED ORAL at 08:58

## 2023-03-02 RX ADMIN — ENOXAPARIN SODIUM 40 MG: 100 INJECTION SUBCUTANEOUS at 09:00

## 2023-03-02 RX ADMIN — ASPIRIN 81 MG: 81 TABLET, CHEWABLE ORAL at 08:59

## 2023-03-02 RX ADMIN — LORAZEPAM 1 MG: 1 TABLET ORAL at 09:00

## 2023-03-02 RX ADMIN — SULFAMETHOXAZOLE AND TRIMETHOPRIM 1 TABLET: 800; 160 TABLET ORAL at 08:58

## 2023-03-02 RX ADMIN — DOCUSATE SODIUM 100 MG: 100 CAPSULE ORAL at 08:59

## 2023-03-02 RX ADMIN — PANTOPRAZOLE SODIUM 40 MG: 40 TABLET, DELAYED RELEASE ORAL at 08:59

## 2023-03-02 RX ADMIN — POTASSIUM CHLORIDE 10 MEQ: 1500 TABLET, EXTENDED RELEASE ORAL at 16:52

## 2023-03-02 RX ADMIN — DESMOPRESSIN ACETATE 40 MG: 0.2 TABLET ORAL at 21:36

## 2023-03-02 RX ADMIN — LEVETIRACETAM 1000 MG: 500 TABLET, FILM COATED ORAL at 21:36

## 2023-03-02 RX ADMIN — SERTRALINE 100 MG: 50 TABLET, FILM COATED ORAL at 08:59

## 2023-03-02 RX ADMIN — OXYCODONE HYDROCHLORIDE AND ACETAMINOPHEN 1 TABLET: 5; 325 TABLET ORAL at 22:59

## 2023-03-02 RX ADMIN — Medication 5 MG: at 21:36

## 2023-03-02 RX ADMIN — SODIUM CHLORIDE, PRESERVATIVE FREE 10 ML: 5 INJECTION INTRAVENOUS at 08:59

## 2023-03-02 RX ADMIN — LAMOTRIGINE 125 MG: 100 TABLET ORAL at 21:36

## 2023-03-02 RX ADMIN — LAMOTRIGINE 125 MG: 100 TABLET ORAL at 08:59

## 2023-03-02 RX ADMIN — OXYCODONE HYDROCHLORIDE AND ACETAMINOPHEN 1 TABLET: 5; 325 TABLET ORAL at 16:53

## 2023-03-02 RX ADMIN — MORPHINE SULFATE 15 MG: 15 TABLET, FILM COATED, EXTENDED RELEASE ORAL at 09:00

## 2023-03-02 RX ADMIN — SODIUM CHLORIDE, PRESERVATIVE FREE 10 ML: 5 INJECTION INTRAVENOUS at 21:39

## 2023-03-02 RX ADMIN — LEVETIRACETAM 1000 MG: 500 TABLET, FILM COATED ORAL at 08:59

## 2023-03-02 RX ADMIN — SULFAMETHOXAZOLE AND TRIMETHOPRIM 1 TABLET: 800; 160 TABLET ORAL at 21:36

## 2023-03-02 RX ADMIN — METOPROLOL TARTRATE 12.5 MG: 25 TABLET ORAL at 21:36

## 2023-03-02 RX ADMIN — DOCUSATE SODIUM 50 MG AND SENNOSIDES 8.6 MG 2 TABLET: 8.6; 5 TABLET, FILM COATED ORAL at 16:52

## 2023-03-02 RX ADMIN — OXYCODONE HYDROCHLORIDE AND ACETAMINOPHEN 1 TABLET: 5; 325 TABLET ORAL at 06:43

## 2023-03-02 RX ADMIN — LORAZEPAM 1 MG: 1 TABLET ORAL at 16:53

## 2023-03-02 RX ADMIN — MORPHINE SULFATE 30 MG: 30 TABLET, FILM COATED, EXTENDED RELEASE ORAL at 21:35

## 2023-03-02 RX ADMIN — MORPHINE SULFATE 30 MG: 30 TABLET, FILM COATED, EXTENDED RELEASE ORAL at 08:59

## 2023-03-02 RX ADMIN — POTASSIUM CHLORIDE 10 MEQ: 1500 TABLET, EXTENDED RELEASE ORAL at 08:58

## 2023-03-02 RX ADMIN — LACOSAMIDE 150 MG: 100 TABLET, FILM COATED ORAL at 21:36

## 2023-03-02 ASSESSMENT — PAIN SCALES - WONG BAKER
WONGBAKER_NUMERICALRESPONSE: 0
WONGBAKER_NUMERICALRESPONSE: 0

## 2023-03-02 ASSESSMENT — PAIN SCALES - GENERAL
PAINLEVEL_OUTOF10: 8
PAINLEVEL_OUTOF10: 8
PAINLEVEL_OUTOF10: 7
PAINLEVEL_OUTOF10: 8
PAINLEVEL_OUTOF10: 7
PAINLEVEL_OUTOF10: 8
PAINLEVEL_OUTOF10: 7
PAINLEVEL_OUTOF10: 8

## 2023-03-02 ASSESSMENT — PAIN DESCRIPTION - LOCATION
LOCATION: BACK

## 2023-03-02 ASSESSMENT — PAIN DESCRIPTION - ORIENTATION: ORIENTATION: LOWER

## 2023-03-02 NOTE — PROGRESS NOTES
Occupational Therapy  Facility/Department: [facility]  Occupational Daily Treatment Note    Name: Heriberto Miller  : 1965  MRN: [id number]  Date of Service: 3/2/2023    Discharge Recommendations:  Patient would benefit from continued therapy after discharge          Patient Diagnosis(es): The primary encounter diagnosis was Altered mental status, unspecified altered mental status type. Diagnoses of Malignant neoplasm of ovary, unspecified laterality ([facility] and Cancer, metastatic to bone [facility]) were also pertinent to this visit. Past Medical History:  has a past medical history of Anemia, Bleeding, Calculus of gallbladder without cholecystitis without obstruction, Cervical cancer ([address] Depression, Diabetes mellitus ([address] GERD (gastroesophageal reflux disease), Hx of blood clots, Hypertension, Intestinal adhesions with partial obstruction ([address] Metastatic cancer ([address] Ovarian cancer ([address] Post chemo evaluation, PRES (posterior reversible encephalopathy syndrome), Somnolence, Splenic lesion, and Traumatic rhabdomyolysis ([address] Past Surgical History:  has a past surgical history that includes Hysterectomy, total abdominal; Port Surgery; Tonsillectomy; IR PORT PLACEMENT > 5 YEARS (2020); Anus surgery; Abscess Drainage (); colectomy (2013); IR EMBOLIZATION HEMORRHAGE (10/05/2020); and Cardiac catheterization. Assessment   Performance deficits / Impairments: Decreased functional mobility ; Decreased ADL status; Decreased strength;Decreased safe awareness;Decreased cognition;Decreased high-level IADLs;Decreased balance;Decreased endurance  Prognosis: Good  Activity Tolerance  Activity Tolerance: Treatment limited secondary to decreased cognition;Patient Tolerated treatment well        Plan   Occupational Therapy Plan  Times Per Week: 3-4x/wk  Current Treatment Recommendations: Balance training, Strengthening, Functional mobility training, Endurance training, Patient/Caregiver education & training, Safety education & training, Equipment evaluation, education, & procurement, Home management training, Self-Care / ADL, Coordination training, Cognitive reorientation     Restrictions  Restrictions/Precautions  Restrictions/Precautions: Fall Risk  Required Braces or Orthoses?: No  Pain assessment: Pt stated 6/10 pain in lower back  Pain intervention: Repositioned    Subjective   General  Chart Reviewed: Yes  Patient assessed for rehabilitation services?: Yes  Family / Caregiver Present: No                           Safety Devices  Type of Devices: Left in bed;Call light within reach;Nurse notified; All fall risk precautions in place; Patient at risk for falls  Balance  Sitting: Without support (MOD independent seated EOB)  Standing: With support (SUP standing EOB/sink. Total time ~10 minutes)  Functional Mobility  Overall Level of Assistance: Stand-by assistance  Interventions: Safety awareness training;Verbal cues  Assistive Device: Gait belt;Walker, rolling        ADL  Grooming: Setup;Verbal cueing;Stand by assistance; Increased time to complete  Grooming Skilled Clinical Factors: Completed oral care and face washing standing at sink  UE Bathing: Stand by assistance;Verbal cueing;Setup; Increased time to complete  UE Bathing Skilled Clinical Factors: Sitting EOB  Additional Comments: Pt supine upon arrival. Pt transferred EOB using bed rails, sit/stand using RW. Pt completed functional mobility to bathroom to complete oral care and face washing standing at sink. Pt denied further ADL care, exited bathroom to simulate household distances. Pt transferred to seated EOB. Pt requested soap and washcloth to clean underarms. Pt completed UE bathing, again pt stated no more ADL care needed. Pt transferred to supine in bed at end of session. When therapist was exiting room, pt asked for a washcloth to complete pericare. Once pt completed care, therapist exited room with pt supine in bed.         Bed mobility  Supine to Sit: Supervision  Sit to Supine: Supervision  Bed Mobility Comments: HOB elevated, utilizing bed rails  Transfers  Sit to stand: Stand by assistance  Stand to sit: Stand by assistance  Transfer Comments: using RW     Cognition  Overall Cognitive Status: Exceptions  Arousal/Alertness: Delayed responses to stimuli  Following Commands: Follows one step commands with repetition; Follows one step commands with increased time  Attention Span: Attends with cues to redirect  Safety Judgement: Decreased awareness of need for assistance;Decreased awareness of need for safety  Initiation: Requires cues for some  Sequencing: Requires cues for some  Cognition Comment: impulsive, flat affect  Orientation  Overall Orientation Status: Within Functional Limits                  Education Given To: Patient  Education Provided Comments: OT POC, importance of participation in therapy, transfer/walker training, safety awareness with fair return  Education Method: Verbal  Barriers to Learning: Cognition  Education Outcome: Continued education needed     AM-PAC Score   AM-[facility] Inpatient Daily Activity Raw Score: 19 (03/02/23 1121)  AM-PAC Inpatient ADL T-Scale Score : 40.22 (03/02/23 1121)  ADL Inpatient CMS 0-100% Score: 42.8 (03/02/23 1121)  ADL Inpatient CMS G-Code Modifier : CK (03/02/23 1121)    Goals  Short Term Goals  Time Frame for Short Term Goals: Pt will, by discharge:  Short Term Goal 1: Perform ADL tasks with mod IND using AE/DME PRN  Short Term Goal 2: Perform functional transfers/functional mobility with mod IND using least restrictive AD  Short Term Goal 3:  Independently demo good safety awareness/during engagement in all ADLs and functional transfers/functional mobility  Short Term Goal 4: Demo 10+ minutes standing tolerance with use of least restrictive AD for increased participation in ADL/IADL tasks       Therapy Time   Individual Concurrent Group Co-treatment   Time In 1026         Time Out 1050         Minutes 24 Timed Code Treatment Minutes: 24 Minutes       Jone Altman S/TOVA  Author Clarisse VERMA

## 2023-03-02 NOTE — PLAN OF CARE
Problem: Discharge Planning  Goal: Discharge to home or other facility with appropriate resources  Outcome: Progressing     Problem: Pain  Goal: Verbalizes/displays adequate comfort level or baseline comfort level  Outcome: Progressing  Flowsheets (Taken 3/1/2023 1724 by Arie Cherry, RN)  Verbalizes/displays adequate comfort level or baseline comfort level: Encourage patient to monitor pain and request assistance     Problem: Skin/Tissue Integrity  Goal: Absence of new skin breakdown  Description: 1. Monitor for areas of redness and/or skin breakdown  2. Assess vascular access sites hourly  3. Every 4-6 hours minimum:  Change oxygen saturation probe site  4. Every 4-6 hours:  If on nasal continuous positive airway pressure, respiratory therapy assess nares and determine need for appliance change or resting period.   Outcome: Progressing     Problem: Safety - Adult  Goal: Free from fall injury  Outcome: Progressing     Problem: Respiratory - Adult  Goal: Achieves optimal ventilation and oxygenation  Outcome: Progressing     Problem: Chronic Conditions and Co-morbidities  Goal: Patient's chronic conditions and co-morbidity symptoms are monitored and maintained or improved  Outcome: Progressing     Problem: ABCDS Injury Assessment  Goal: Absence of physical injury  Outcome: Progressing
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
## 2023-03-02 NOTE — PLAN OF CARE
Problem: Discharge Planning  Goal: Discharge to home or other facility with appropriate resources  3/2/2023 1034 by Aurelio Membreno RN  Outcome: Progressing  3/2/2023 0635 by Florin Hung RN  Outcome: Progressing     Problem: Pain  Goal: Verbalizes/displays adequate comfort level or baseline comfort level  3/2/2023 1034 by Aurelio Membreno RN  Outcome: Progressing  Flowsheets (Taken 3/2/2023 0750)  Verbalizes/displays adequate comfort level or baseline comfort level: Encourage patient to monitor pain and request assistance  3/2/2023 0635 by Florin Hung RN  Outcome: Progressing  Flowsheets (Taken 3/1/2023 1724 by Aurelio Membreno RN)  Verbalizes/displays adequate comfort level or baseline comfort level: Encourage patient to monitor pain and request assistance     Problem: Skin/Tissue Integrity  Goal: Absence of new skin breakdown  Description: 1. Monitor for areas of redness and/or skin breakdown  2. Assess vascular access sites hourly  3. Every 4-6 hours minimum:  Change oxygen saturation probe site  4. Every 4-6 hours:  If on nasal continuous positive airway pressure, respiratory therapy assess nares and determine need for appliance change or resting period.   3/2/2023 1034 by Aurelio Membreno RN  Outcome: Progressing  3/2/2023 9049 by Florin Hung RN  Outcome: Progressing     Problem: Safety - Adult  Goal: Free from fall injury  3/2/2023 1034 by Aurelio Membreno RN  Outcome: Progressing  3/2/2023 0635 by Florin Hung RN  Outcome: Progressing     Problem: Respiratory - Adult  Goal: Achieves optimal ventilation and oxygenation  3/2/2023 1034 by Aurelio Membreno RN  Outcome: Progressing  3/2/2023 0635 by Florin Hung RN  Outcome: Progressing     Problem: Chronic Conditions and Co-morbidities  Goal: Patient's chronic conditions and co-morbidity symptoms are monitored and maintained or improved  3/2/2023 1034 by Aurelio Membreno RN  Outcome: Progressing  3/2/2023 0635 by Mary Michaud Davie Perez RN  Outcome: Progressing     Problem: ABCDS Injury Assessment  Goal: Absence of physical injury  3/2/2023 1034 by Alexander Suggs RN  Outcome: Progressing  3/2/2023 0635 by Sara Aguilar RN  Outcome: Progressing

## 2023-03-02 NOTE — PROGRESS NOTES
Reviewed medical record for patient updates. Noted in Internal Medicine note that patient is refusing to speak with palliative care, that she is agreeable to go to SNF for rehab, is interested in continuing chemotherapy with oncology when able. She is refusing to discuss advanced directives and wishes to remain full code. Pt and family have met previously with palliative care. We have discussed palliative care and hospice care. Offered either course of care depending what patient and families goals are. Pt and family agreed to meet for information with hospice representative, then decided against that. Pt and family relate they would like palliative care to follow patient and know it is charted that they do not. I decided to reach out to Andrea patient's daughter and Love Higgins to clarify wishes before I go to see patient. Left message and asked her to return my call. Christin Holliday know that I want to make sure that family and patient understand that palliative care can follow outpatient and that palliative care is not hospice. Buzz Brown can continue chemotherapy and any treatments she wishes while on palliative care. Palliative care is another laye of support and symptom management. They will continue to assess patient's wishes and goals throughout care. If they wish for palliative care that can be set up, if they do not wish for palliative care we respect that decision as well. Writer would like to make sure of patient and family's wishes so that we can honor them.   We are happy to continue to see for support, but if they do not want us to follow we will honor that request.     WAYNE HEMPHILL [facility] Coordinator  Tyna Boeck BSN, [facility]  [address] [phone]

## 2023-03-02 NOTE — RT PROTOCOL NOTE
PATIENT REFUSES TO WEAR BIPAP     [x] Risks and benefits explained to patient   [x] Patient refuses to wear Bipap stating she doesn't need it at this time. [x] Patient verbalizes understanding of information presented.

## 2023-03-02 NOTE — PROGRESS NOTES
Precert pending for [facility]. Refused Bi-pap overnight  Serial urine's still being collected. Washed up today with OT.

## 2023-03-02 NOTE — CARE COORDINATION
Transitional planning-called [facility] and left message with Tana Harris regarding precert.     1100 message from Tana Harris regarding precert-still pending

## 2023-03-03 LAB
GLUCOSE BLD-MCNC: 115 MG/DL (ref 65–105)
GLUCOSE BLD-MCNC: 119 MG/DL (ref 65–105)
GLUCOSE BLD-MCNC: 126 MG/DL (ref 65–105)
GLUCOSE BLD-MCNC: 131 MG/DL (ref 65–105)

## 2023-03-03 PROCEDURE — 6370000000 HC RX 637 (ALT 250 FOR IP): Performed by: FAMILY MEDICINE

## 2023-03-03 PROCEDURE — 6360000002 HC RX W HCPCS: Performed by: INTERNAL MEDICINE

## 2023-03-03 PROCEDURE — 2580000003 HC RX 258: Performed by: INTERNAL MEDICINE

## 2023-03-03 PROCEDURE — 6370000000 HC RX 637 (ALT 250 FOR IP): Performed by: INTERNAL MEDICINE

## 2023-03-03 PROCEDURE — 99232 SBSQ HOSP IP/OBS MODERATE 35: CPT | Performed by: INTERNAL MEDICINE

## 2023-03-03 PROCEDURE — 2060000000 HC ICU INTERMEDIATE R&B

## 2023-03-03 PROCEDURE — 82947 ASSAY GLUCOSE BLOOD QUANT: CPT

## 2023-03-03 RX ORDER — POLYETHYLENE GLYCOL 3350 17 G/17G
17 POWDER, FOR SOLUTION ORAL DAILY
Status: DISCONTINUED | OUTPATIENT
Start: 2023-03-03 | End: 2023-03-06 | Stop reason: HOSPADM

## 2023-03-03 RX ADMIN — MORPHINE SULFATE 30 MG: 30 TABLET, FILM COATED, EXTENDED RELEASE ORAL at 09:07

## 2023-03-03 RX ADMIN — ASPIRIN 81 MG: 81 TABLET, CHEWABLE ORAL at 09:00

## 2023-03-03 RX ADMIN — SODIUM CHLORIDE, PRESERVATIVE FREE 10 ML: 5 INJECTION INTRAVENOUS at 09:07

## 2023-03-03 RX ADMIN — OXYCODONE HYDROCHLORIDE AND ACETAMINOPHEN 1 TABLET: 5; 325 TABLET ORAL at 13:09

## 2023-03-03 RX ADMIN — POTASSIUM CHLORIDE 10 MEQ: 1500 TABLET, EXTENDED RELEASE ORAL at 18:06

## 2023-03-03 RX ADMIN — LEVETIRACETAM 1000 MG: 500 TABLET, FILM COATED ORAL at 09:02

## 2023-03-03 RX ADMIN — METOPROLOL TARTRATE 12.5 MG: 25 TABLET ORAL at 21:29

## 2023-03-03 RX ADMIN — Medication 5 MG: at 21:27

## 2023-03-03 RX ADMIN — DESMOPRESSIN ACETATE 40 MG: 0.2 TABLET ORAL at 21:27

## 2023-03-03 RX ADMIN — ENOXAPARIN SODIUM 40 MG: 100 INJECTION SUBCUTANEOUS at 09:04

## 2023-03-03 RX ADMIN — SERTRALINE 100 MG: 50 TABLET, FILM COATED ORAL at 08:59

## 2023-03-03 RX ADMIN — LEVETIRACETAM 1000 MG: 500 TABLET, FILM COATED ORAL at 21:27

## 2023-03-03 RX ADMIN — PANTOPRAZOLE SODIUM 40 MG: 40 TABLET, DELAYED RELEASE ORAL at 09:00

## 2023-03-03 RX ADMIN — METOPROLOL TARTRATE 12.5 MG: 25 TABLET ORAL at 09:03

## 2023-03-03 RX ADMIN — LACOSAMIDE 150 MG: 100 TABLET, FILM COATED ORAL at 09:04

## 2023-03-03 RX ADMIN — POLYETHYLENE GLYCOL 3350 17 G: 17 POWDER, FOR SOLUTION ORAL at 13:14

## 2023-03-03 RX ADMIN — LAMOTRIGINE 125 MG: 100 TABLET ORAL at 21:27

## 2023-03-03 RX ADMIN — LACOSAMIDE 150 MG: 100 TABLET, FILM COATED ORAL at 21:27

## 2023-03-03 RX ADMIN — SULFAMETHOXAZOLE AND TRIMETHOPRIM 1 TABLET: 800; 160 TABLET ORAL at 21:28

## 2023-03-03 RX ADMIN — MORPHINE SULFATE 15 MG: 15 TABLET, FILM COATED, EXTENDED RELEASE ORAL at 09:06

## 2023-03-03 RX ADMIN — SULFAMETHOXAZOLE AND TRIMETHOPRIM 1 TABLET: 800; 160 TABLET ORAL at 09:03

## 2023-03-03 RX ADMIN — OXYCODONE HYDROCHLORIDE AND ACETAMINOPHEN 1 TABLET: 5; 325 TABLET ORAL at 18:05

## 2023-03-03 RX ADMIN — POTASSIUM CHLORIDE 10 MEQ: 1500 TABLET, EXTENDED RELEASE ORAL at 09:02

## 2023-03-03 RX ADMIN — LAMOTRIGINE 125 MG: 100 TABLET ORAL at 09:01

## 2023-03-03 RX ADMIN — MORPHINE SULFATE 30 MG: 30 TABLET, FILM COATED, EXTENDED RELEASE ORAL at 21:28

## 2023-03-03 RX ADMIN — LORAZEPAM 1 MG: 1 TABLET ORAL at 18:05

## 2023-03-03 RX ADMIN — DOCUSATE SODIUM 50 MG AND SENNOSIDES 8.6 MG 2 TABLET: 8.6; 5 TABLET, FILM COATED ORAL at 09:02

## 2023-03-03 RX ADMIN — MORPHINE SULFATE 15 MG: 15 TABLET, FILM COATED, EXTENDED RELEASE ORAL at 21:28

## 2023-03-03 ASSESSMENT — PAIN SCALES - GENERAL: PAINLEVEL_OUTOF10: 8
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
## 2023-03-03 NOTE — CARE COORDINATION
Consult received as pt was denied SNF and states she has no place to go  Met with pt who stated she is waiting on a c/b from her half-sister, Katey Patten. Pt believes she may be able to live with Katey Patten. Pt stated Katey Patten is going to call her once she gets off work. Inquired about other family - pt became tearful and stated both of her sons have . Stated she has one dtr here and one dtr in [place]. CM is waiting on call from dtr that lives here. She does not seem appropriate for a shelter d/t her medical conditions.   CAMILLE updated

## 2023-03-03 NOTE — CARE COORDINATION
Transitional planning-called [facility] and left message with Arleta Mcardle. [phone] call from Arleta Mcardle at [facility], patient denied placement by insurance. [phone] called daughter Peewee Bellamy her of the insurance denial for her mother to go to [facility]. She states her mother is basically homeless. Informed the patient of her insurances decision-she is upset. She states she has no place to go. Order placed for social work. Talked with  Siobhan Salgado says the patient is trying to find someone that will let her stay with them. Daughter Siria Casper never called back like she said she would.  Patient needs long term placement-need choices-has [employer] nurses notes/vital signs

## 2023-03-03 NOTE — PLAN OF CARE
Problem: Discharge Planning  Goal: Discharge to home or other facility with appropriate resources  3/3/2023 1040 by Maria Esther Darden RN  Outcome: Progressing  Flowsheets (Taken 3/3/2023 0800)  Discharge to home or other facility with appropriate resources: Identify barriers to discharge with patient and caregiver  3/3/2023 0523 by Denny Schmitt RN  Outcome: Progressing     Problem: Pain  Goal: Verbalizes/displays adequate comfort level or baseline comfort level  3/3/2023 1040 by Maria Esther Darden RN  Outcome: Progressing  3/3/2023 0523 by Denny Schmitt RN  Outcome: Progressing     Problem: Skin/Tissue Integrity  Goal: Absence of new skin breakdown  Description: 1. Monitor for areas of redness and/or skin breakdown  2. Assess vascular access sites hourly  3. Every 4-6 hours minimum:  Change oxygen saturation probe site  4. Every 4-6 hours:  If on nasal continuous positive airway pressure, respiratory therapy assess nares and determine need for appliance change or resting period.   3/3/2023 1040 by Maria Esther Darden RN  Outcome: Progressing  3/3/2023 0523 by Denny Schmitt RN  Outcome: Progressing     Problem: Safety - Adult  Goal: Free from fall injury  3/3/2023 1040 by Maria Esther Darden RN  Outcome: Progressing  3/3/2023 0523 by Denny Schmitt RN  Outcome: Progressing     Problem: Respiratory - Adult  Goal: Achieves optimal ventilation and oxygenation  3/3/2023 1040 by Maria Esther Darden RN  Outcome: Progressing  Flowsheets (Taken 3/3/2023 0800)  Achieves optimal ventilation and oxygenation: Assess for changes in respiratory status  3/3/2023 0523 by Denny Schmitt RN  Outcome: Progressing     Problem: Chronic Conditions and Co-morbidities  Goal: Patient's chronic conditions and co-morbidity symptoms are monitored and maintained or improved  3/3/2023 1040 by Maria Esther Darden RN  Outcome: Progressing  Flowsheets (Taken 3/3/2023 0800)  Care Plan - Patient's Chronic Conditions and Co-Morbidity Symptoms are Monitored and Maintained or Improved: Monitor and assess patient's chronic conditions and comorbid symptoms for stability, deterioration, or improvement  3/3/2023 0523 by Becky Rashid RN  Outcome: Progressing     Problem: ABCDS Injury Assessment  Goal: Absence of physical injury  3/3/2023 1040 by Bean Blackman RN  Outcome: Progressing  3/3/2023 0523 by Becky Rashid RN  Outcome: Progressing

## 2023-03-04 LAB
GLUCOSE BLD-MCNC: 100 MG/DL (ref 65–105)
GLUCOSE BLD-MCNC: 105 MG/DL (ref 65–105)
GLUCOSE BLD-MCNC: 105 MG/DL (ref 65–105)
GLUCOSE BLD-MCNC: 132 MG/DL (ref 65–105)
GLUCOSE BLD-MCNC: 91 MG/DL (ref 65–105)

## 2023-03-04 PROCEDURE — 6370000000 HC RX 637 (ALT 250 FOR IP): Performed by: FAMILY MEDICINE

## 2023-03-04 PROCEDURE — 94660 CPAP INITIATION&MGMT: CPT

## 2023-03-04 PROCEDURE — 6370000000 HC RX 637 (ALT 250 FOR IP): Performed by: INTERNAL MEDICINE

## 2023-03-04 PROCEDURE — 82947 ASSAY GLUCOSE BLOOD QUANT: CPT

## 2023-03-04 PROCEDURE — 2060000000 HC ICU INTERMEDIATE R&B

## 2023-03-04 PROCEDURE — 6360000002 HC RX W HCPCS: Performed by: INTERNAL MEDICINE

## 2023-03-04 PROCEDURE — 99232 SBSQ HOSP IP/OBS MODERATE 35: CPT | Performed by: INTERNAL MEDICINE

## 2023-03-04 PROCEDURE — 2580000003 HC RX 258: Performed by: INTERNAL MEDICINE

## 2023-03-04 RX ADMIN — ASPIRIN 81 MG: 81 TABLET, CHEWABLE ORAL at 09:03

## 2023-03-04 RX ADMIN — METOPROLOL TARTRATE 12.5 MG: 25 TABLET ORAL at 09:02

## 2023-03-04 RX ADMIN — ONDANSETRON 4 MG: 4 TABLET, ORALLY DISINTEGRATING ORAL at 11:54

## 2023-03-04 RX ADMIN — LORAZEPAM 1 MG: 1 TABLET ORAL at 04:33

## 2023-03-04 RX ADMIN — LEVETIRACETAM 1000 MG: 500 TABLET, FILM COATED ORAL at 20:28

## 2023-03-04 RX ADMIN — LACOSAMIDE 150 MG: 100 TABLET, FILM COATED ORAL at 20:29

## 2023-03-04 RX ADMIN — Medication 5 MG: at 20:28

## 2023-03-04 RX ADMIN — METOPROLOL TARTRATE 12.5 MG: 25 TABLET ORAL at 20:28

## 2023-03-04 RX ADMIN — SERTRALINE 100 MG: 50 TABLET, FILM COATED ORAL at 09:03

## 2023-03-04 RX ADMIN — LAMOTRIGINE 125 MG: 100 TABLET ORAL at 09:03

## 2023-03-04 RX ADMIN — OXYCODONE HYDROCHLORIDE AND ACETAMINOPHEN 1 TABLET: 5; 325 TABLET ORAL at 04:34

## 2023-03-04 RX ADMIN — POLYETHYLENE GLYCOL 3350 17 G: 17 POWDER, FOR SOLUTION ORAL at 09:08

## 2023-03-04 RX ADMIN — LACOSAMIDE 150 MG: 100 TABLET, FILM COATED ORAL at 09:04

## 2023-03-04 RX ADMIN — SODIUM CHLORIDE, PRESERVATIVE FREE 10 ML: 5 INJECTION INTRAVENOUS at 20:53

## 2023-03-04 RX ADMIN — POTASSIUM CHLORIDE 10 MEQ: 1500 TABLET, EXTENDED RELEASE ORAL at 17:24

## 2023-03-04 RX ADMIN — PANTOPRAZOLE SODIUM 40 MG: 40 TABLET, DELAYED RELEASE ORAL at 09:01

## 2023-03-04 RX ADMIN — DESMOPRESSIN ACETATE 40 MG: 0.2 TABLET ORAL at 20:29

## 2023-03-04 RX ADMIN — SULFAMETHOXAZOLE AND TRIMETHOPRIM 1 TABLET: 800; 160 TABLET ORAL at 09:01

## 2023-03-04 RX ADMIN — LORAZEPAM 1 MG: 1 TABLET ORAL at 17:25

## 2023-03-04 RX ADMIN — POTASSIUM CHLORIDE 10 MEQ: 1500 TABLET, EXTENDED RELEASE ORAL at 09:02

## 2023-03-04 RX ADMIN — SODIUM CHLORIDE, PRESERVATIVE FREE 10 ML: 5 INJECTION INTRAVENOUS at 09:13

## 2023-03-04 RX ADMIN — MORPHINE SULFATE 15 MG: 15 TABLET, FILM COATED, EXTENDED RELEASE ORAL at 09:07

## 2023-03-04 RX ADMIN — MORPHINE SULFATE 15 MG: 15 TABLET, FILM COATED, EXTENDED RELEASE ORAL at 20:34

## 2023-03-04 RX ADMIN — ENOXAPARIN SODIUM 40 MG: 100 INJECTION SUBCUTANEOUS at 09:04

## 2023-03-04 RX ADMIN — MECLIZINE 12.5 MG: 12.5 TABLET ORAL at 05:58

## 2023-03-04 RX ADMIN — DOCUSATE SODIUM 50 MG AND SENNOSIDES 8.6 MG 2 TABLET: 8.6; 5 TABLET, FILM COATED ORAL at 09:01

## 2023-03-04 RX ADMIN — LAMOTRIGINE 125 MG: 100 TABLET ORAL at 20:28

## 2023-03-04 RX ADMIN — LEVETIRACETAM 1000 MG: 500 TABLET, FILM COATED ORAL at 09:01

## 2023-03-04 RX ADMIN — MORPHINE SULFATE 30 MG: 30 TABLET, FILM COATED, EXTENDED RELEASE ORAL at 09:03

## 2023-03-04 RX ADMIN — MORPHINE SULFATE 30 MG: 30 TABLET, FILM COATED, EXTENDED RELEASE ORAL at 20:34

## 2023-03-04 RX ADMIN — NALOXEGOL OXALATE 12.5 MG: 12.5 TABLET, FILM COATED ORAL at 06:10

## 2023-03-04 ASSESSMENT — PAIN DESCRIPTION - ORIENTATION: ORIENTATION: LOWER

## 2023-03-04 ASSESSMENT — PAIN SCALES - GENERAL
PAINLEVEL_OUTOF10: 2
PAINLEVEL_OUTOF10: 8

## 2023-03-04 ASSESSMENT — PAIN DESCRIPTION - LOCATION: LOCATION: BACK

## 2023-03-04 ASSESSMENT — PAIN DESCRIPTION - DESCRIPTORS: DESCRIPTORS: ACHING
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
## 2023-03-04 NOTE — PROGRESS NOTES
Pt had many episodes of confusion today, pt found twice naked in room looking for her clothes, because she states she was going home. Pt currently in bed, no cc/o pain, no s/s of distress, resp easy, even and unlabored will cont to monitor pt for any changes.

## 2023-03-04 NOTE — CARE COORDINATION
Transitional planning: Received message from [facility] with out of state daughter's (Dequan Torres) phone number requesting a return call. Returned Bill Cade call. She is inquiring as to how the insurance company can deny her mother SNF placement. Writer explained that we are not given the reason why just that they are denied. Daughter continues to insist that her mother has no where to go and can't go to the streets and what is she to do. Writer voiced that we would continue to work on it and that social work is now involved but we can make no promises. Daughter states understanding. 1115 Spoke to patient at bedside. Confirmed that patient would be going home to sister Savanna's home on discharge. Also spoke to patient regarding if she felt she needed or wanted [employer]. She states that she would like it. Writer provided list of [address] companies to patient. Provided freedom of choice. Writer will return later for choices. Post Acute Facility/Agency List     Provided patient with the following list, the list includes the overall star ratings obtained from CMS per the Medicare Web site (www.Medicare.gov):     [] [address]  [] Acute Inpatient Rehabilitation Facilities  [] Skilled Nursing Facilities  [x] Home Care    Provided verbal instructions on how to utilize the QR Code to obtain additional detailed star ratings from www. Medicare. gov     offered to print and provide the detailed list:    []Accepted   [x]Declined      [phone] attempted to talk to patient regarding home health care choices and get her sister Destiny Ruth address. She does not have the address and states that her daughter will have to make the choices. She became slightly agitated and somewhat tearful when discussing.

## 2023-03-04 NOTE — PLAN OF CARE
Problem: Respiratory - Adult  Goal: Achieves optimal ventilation and oxygenation  3/4/2023 1717 by Amanda Lagunas RCP  Outcome: Progressing   BRONCHOSPASM/BRONCHOCONSTRICTION   [x]         IMPROVE AERATION/BREATH SOUNDS  [x]   ADMINISTER BRONCHODILATOR THERAPY AS APPROPRIATE  [x]   ASSESS BREATH SOUNDS  []   IMPLEMENT AEROSOL/MDI PROTOCOL  [x]   PATIENT EDUCATION AS NEEDED   PROVIDE ADEQUATE OXYGENATION WITH ACCEPTABLE SP02/ABG'S  [x]  IDENTIFY APPROPRIATE OXYGEN THERAPY  [x]   MONITOR SP02/ABG'S AS NEEDED   [x]   PATIENT EDUCATION AS NEEDED

## 2023-03-04 NOTE — CARE COORDINATION
Met with pt to see if she had spoken with her half-sister, Yaquelin Hanks, as yest pt stated she believed she could stay with her. Pt stated she spoke with Yaquelin Hanks early this am and she is able to stay with Yaquelin Hanks. Pt stated Yaquelin Hanks will pick her up at discharge. Stated she had not notified her dtrs of her plan yet.   CM updated

## 2023-03-05 LAB
ALBUMIN SERPL-MCNC: 3.2 G/DL (ref 3.5–5.2)
ALBUMIN/GLOBULIN RATIO: 1.1 (ref 1–2.5)
ALP SERPL-CCNC: 58 U/L (ref 35–104)
ALT SERPL-CCNC: 6 U/L (ref 5–33)
AMMONIA PLAS-SCNC: 36 UMOL/L (ref 11–51)
ANION GAP SERPL CALCULATED.3IONS-SCNC: 9 MMOL/L (ref 9–17)
AST SERPL-CCNC: 14 U/L
BILIRUB DIRECT SERPL-MCNC: <0.1 MG/DL
BILIRUB INDIRECT SERPL-MCNC: ABNORMAL MG/DL (ref 0–1)
BILIRUB SERPL-MCNC: <0.1 MG/DL (ref 0.3–1.2)
BUN SERPL-MCNC: 11 MG/DL (ref 8–23)
CALCIUM SERPL-MCNC: 8.6 MG/DL (ref 8.6–10.4)
CHLORIDE SERPL-SCNC: 104 MMOL/L (ref 98–107)
CO2 SERPL-SCNC: 23 MMOL/L (ref 20–31)
CREAT SERPL-MCNC: 0.6 MG/DL (ref 0.5–0.9)
GFR SERPL CREATININE-BSD FRML MDRD: >60 ML/MIN/1.73M2
GLUCOSE SERPL-MCNC: 85 MG/DL (ref 70–99)
POTASSIUM SERPL-SCNC: 4.5 MMOL/L (ref 3.7–5.3)
PROT SERPL-MCNC: 6 G/DL (ref 6.4–8.3)
SODIUM SERPL-SCNC: 136 MMOL/L (ref 135–144)

## 2023-03-05 PROCEDURE — 99231 SBSQ HOSP IP/OBS SF/LOW 25: CPT | Performed by: INTERNAL MEDICINE

## 2023-03-05 PROCEDURE — 82140 ASSAY OF AMMONIA: CPT

## 2023-03-05 PROCEDURE — 2580000003 HC RX 258: Performed by: INTERNAL MEDICINE

## 2023-03-05 PROCEDURE — 2060000000 HC ICU INTERMEDIATE R&B

## 2023-03-05 PROCEDURE — 6370000000 HC RX 637 (ALT 250 FOR IP): Performed by: INTERNAL MEDICINE

## 2023-03-05 PROCEDURE — 36415 COLL VENOUS BLD VENIPUNCTURE: CPT

## 2023-03-05 PROCEDURE — 80048 BASIC METABOLIC PNL TOTAL CA: CPT

## 2023-03-05 PROCEDURE — 6360000002 HC RX W HCPCS: Performed by: INTERNAL MEDICINE

## 2023-03-05 PROCEDURE — 80076 HEPATIC FUNCTION PANEL: CPT

## 2023-03-05 PROCEDURE — 6370000000 HC RX 637 (ALT 250 FOR IP): Performed by: FAMILY MEDICINE

## 2023-03-05 RX ORDER — ONDANSETRON 4 MG/1
4 TABLET, ORALLY DISINTEGRATING ORAL EVERY 8 HOURS PRN
Qty: 60 TABLET | Refills: 1 | Status: SHIPPED | OUTPATIENT
Start: 2023-03-05

## 2023-03-05 RX ORDER — CHOLECALCIFEROL (VITAMIN D3) 125 MCG
5 CAPSULE ORAL NIGHTLY PRN
Qty: 30 TABLET | Refills: 1 | Status: SHIPPED | OUTPATIENT
Start: 2023-03-05

## 2023-03-05 RX ORDER — OXYCODONE HYDROCHLORIDE AND ACETAMINOPHEN 5; 325 MG/1; MG/1
1 TABLET ORAL EVERY 6 HOURS PRN
Qty: 20 TABLET | Refills: 0 | Status: SHIPPED | OUTPATIENT
Start: 2023-03-05 | End: 2023-03-10

## 2023-03-05 RX ORDER — ASPIRIN 81 MG/1
81 TABLET, CHEWABLE ORAL DAILY
Qty: 30 TABLET | Refills: 1 | Status: SHIPPED | OUTPATIENT
Start: 2023-03-05

## 2023-03-05 RX ORDER — ATORVASTATIN CALCIUM 40 MG/1
40 TABLET, FILM COATED ORAL NIGHTLY
Qty: 30 TABLET | Refills: 1 | Status: SHIPPED | OUTPATIENT
Start: 2023-03-05

## 2023-03-05 RX ORDER — LACOSAMIDE 150 MG/1
150 TABLET ORAL 2 TIMES DAILY
Qty: 60 TABLET | Refills: 1 | Status: SHIPPED | OUTPATIENT
Start: 2023-03-05 | End: 2026-03-06

## 2023-03-05 RX ORDER — MICONAZOLE NITRATE 20 MG/G
POWDER TOPICAL
Qty: 45 G | Refills: 1 | Status: SHIPPED | OUTPATIENT
Start: 2023-03-05

## 2023-03-05 RX ORDER — POLYETHYLENE GLYCOL 3350 17 G/17G
17 POWDER, FOR SOLUTION ORAL DAILY
Qty: 527 G | Refills: 1 | Status: SHIPPED | OUTPATIENT
Start: 2023-03-06 | End: 2023-04-05

## 2023-03-05 RX ORDER — LAMOTRIGINE 25 MG/1
125 TABLET ORAL 2 TIMES DAILY
Qty: 300 TABLET | Refills: 1 | Status: SHIPPED | OUTPATIENT
Start: 2023-03-05

## 2023-03-05 RX ORDER — LEVETIRACETAM 1000 MG/1
1000 TABLET ORAL 2 TIMES DAILY
Qty: 60 TABLET | Refills: 1 | Status: SHIPPED | OUTPATIENT
Start: 2023-03-05

## 2023-03-05 RX ORDER — PANTOPRAZOLE SODIUM 40 MG/1
40 TABLET, DELAYED RELEASE ORAL DAILY
Qty: 30 TABLET | Refills: 1 | Status: SHIPPED | OUTPATIENT
Start: 2023-03-05

## 2023-03-05 RX ORDER — LORAZEPAM 1 MG/1
1 TABLET ORAL EVERY 8 HOURS PRN
Qty: 6 TABLET | Refills: 0 | Status: SHIPPED | OUTPATIENT
Start: 2023-03-05 | End: 2023-03-08

## 2023-03-05 RX ORDER — MECLIZINE HCL 12.5 MG/1
12.5 TABLET ORAL 3 TIMES DAILY PRN
Qty: 30 TABLET | Refills: 0 | Status: SHIPPED | OUTPATIENT
Start: 2023-03-05 | End: 2023-03-15

## 2023-03-05 RX ADMIN — POLYETHYLENE GLYCOL 3350 17 G: 17 POWDER, FOR SOLUTION ORAL at 08:00

## 2023-03-05 RX ADMIN — LAMOTRIGINE 125 MG: 100 TABLET ORAL at 08:00

## 2023-03-05 RX ADMIN — METOPROLOL TARTRATE 12.5 MG: 25 TABLET ORAL at 08:05

## 2023-03-05 RX ADMIN — NALOXEGOL OXALATE 12.5 MG: 12.5 TABLET, FILM COATED ORAL at 08:02

## 2023-03-05 RX ADMIN — POTASSIUM CHLORIDE 10 MEQ: 1500 TABLET, EXTENDED RELEASE ORAL at 08:04

## 2023-03-05 RX ADMIN — LACOSAMIDE 150 MG: 100 TABLET, FILM COATED ORAL at 08:02

## 2023-03-05 RX ADMIN — METOPROLOL TARTRATE 12.5 MG: 25 TABLET ORAL at 20:17

## 2023-03-05 RX ADMIN — SODIUM CHLORIDE, PRESERVATIVE FREE 10 ML: 5 INJECTION INTRAVENOUS at 20:32

## 2023-03-05 RX ADMIN — LACOSAMIDE 150 MG: 100 TABLET, FILM COATED ORAL at 20:16

## 2023-03-05 RX ADMIN — LEVETIRACETAM 1000 MG: 500 TABLET, FILM COATED ORAL at 20:16

## 2023-03-05 RX ADMIN — Medication 5 MG: at 20:16

## 2023-03-05 RX ADMIN — POTASSIUM CHLORIDE 10 MEQ: 1500 TABLET, EXTENDED RELEASE ORAL at 16:25

## 2023-03-05 RX ADMIN — PANTOPRAZOLE SODIUM 40 MG: 40 TABLET, DELAYED RELEASE ORAL at 08:00

## 2023-03-05 RX ADMIN — SODIUM CHLORIDE, PRESERVATIVE FREE 10 ML: 5 INJECTION INTRAVENOUS at 08:07

## 2023-03-05 RX ADMIN — MORPHINE SULFATE 15 MG: 15 TABLET, FILM COATED, EXTENDED RELEASE ORAL at 20:28

## 2023-03-05 RX ADMIN — DESMOPRESSIN ACETATE 40 MG: 0.2 TABLET ORAL at 20:16

## 2023-03-05 RX ADMIN — DOCUSATE SODIUM 50 MG AND SENNOSIDES 8.6 MG 2 TABLET: 8.6; 5 TABLET, FILM COATED ORAL at 08:01

## 2023-03-05 RX ADMIN — MORPHINE SULFATE 30 MG: 30 TABLET, FILM COATED, EXTENDED RELEASE ORAL at 08:02

## 2023-03-05 RX ADMIN — MORPHINE SULFATE 30 MG: 30 TABLET, FILM COATED, EXTENDED RELEASE ORAL at 20:28

## 2023-03-05 RX ADMIN — ENOXAPARIN SODIUM 40 MG: 100 INJECTION SUBCUTANEOUS at 08:00

## 2023-03-05 RX ADMIN — LAMOTRIGINE 125 MG: 100 TABLET ORAL at 20:16

## 2023-03-05 RX ADMIN — ASPIRIN 81 MG: 81 TABLET, CHEWABLE ORAL at 08:00

## 2023-03-05 RX ADMIN — LEVETIRACETAM 1000 MG: 500 TABLET, FILM COATED ORAL at 08:01

## 2023-03-05 RX ADMIN — SERTRALINE 100 MG: 50 TABLET, FILM COATED ORAL at 08:06

## 2023-03-05 RX ADMIN — MORPHINE SULFATE 15 MG: 15 TABLET, FILM COATED, EXTENDED RELEASE ORAL at 08:05

## 2023-03-05 ASSESSMENT — PAIN SCALES - GENERAL
PAINLEVEL_OUTOF10: 0
PAINLEVEL_OUTOF10: 0
PAINLEVEL_OUTOF10: 7

## 2023-03-05 NOTE — PROGRESS NOTES
CLINICAL PHARMACY NOTE: MEDS TO BEDS    Total # of Prescriptions Filled: 9   The following medications were delivered to the patient:  Ondansetron 4mg odt  Clearlax 17gm scoop powd  Antifungal 2% powder  Aspirin low dose 81mg chew  Levetiracetam 100mg tabs  Meclizine hcl 12.5mg tabs  Atorvastatin calcium 40mg tabs  Pantoprazole sod 40mg tabs  Metoprolol tart 25mg tabs    Additional Documentation:   Delivered to pt in room 538 on 3/5 at 11:15AM. Copay was $33.28 paid with [employer]

## 2023-03-05 NOTE — CARE COORDINATION
Transitional planning: Phone call to daughter Vaishnavi Norman ([facility]). Discussed with her is she knows the half sister patient is talking about going to stay with. Vaishnavi Norman states that she has met her once but has not way to contact her and does not know if she can be with her mother 24 hrs to supervise her. She states that she and her sister do not think her other should go anywhere that she will be left alone and that she should go to a facility. Writer advised daughter as the DPOA to apply for Medicaid for the patient as [employer] is not going to cover long term care but Medicaid will and some facilities will take patients with a pending medicaid number. She voices that she will look into doing that. Writer told her that our HELP program could probably also help her if she is here tomorrow get started.
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
## 2023-03-06 VITALS
OXYGEN SATURATION: 98 % | WEIGHT: 137.35 LBS | HEART RATE: 65 BPM | RESPIRATION RATE: 17 BRPM | SYSTOLIC BLOOD PRESSURE: 128 MMHG | HEIGHT: 65 IN | DIASTOLIC BLOOD PRESSURE: 53 MMHG | TEMPERATURE: 97.8 F | BODY MASS INDEX: 22.88 KG/M2

## 2023-03-06 PROCEDURE — 6370000000 HC RX 637 (ALT 250 FOR IP): Performed by: INTERNAL MEDICINE

## 2023-03-06 PROCEDURE — 99239 HOSP IP/OBS DSCHRG MGMT >30: CPT | Performed by: INTERNAL MEDICINE

## 2023-03-06 PROCEDURE — 2580000003 HC RX 258: Performed by: INTERNAL MEDICINE

## 2023-03-06 PROCEDURE — 6360000002 HC RX W HCPCS: Performed by: INTERNAL MEDICINE

## 2023-03-06 PROCEDURE — 6370000000 HC RX 637 (ALT 250 FOR IP): Performed by: FAMILY MEDICINE

## 2023-03-06 RX ADMIN — LACOSAMIDE 150 MG: 100 TABLET, FILM COATED ORAL at 08:51

## 2023-03-06 RX ADMIN — LEVETIRACETAM 1000 MG: 500 TABLET, FILM COATED ORAL at 08:51

## 2023-03-06 RX ADMIN — SERTRALINE 100 MG: 50 TABLET, FILM COATED ORAL at 08:50

## 2023-03-06 RX ADMIN — MORPHINE SULFATE 15 MG: 15 TABLET, FILM COATED, EXTENDED RELEASE ORAL at 08:52

## 2023-03-06 RX ADMIN — NALOXEGOL OXALATE 12.5 MG: 12.5 TABLET, FILM COATED ORAL at 06:37

## 2023-03-06 RX ADMIN — POTASSIUM CHLORIDE 10 MEQ: 1500 TABLET, EXTENDED RELEASE ORAL at 08:52

## 2023-03-06 RX ADMIN — ENOXAPARIN SODIUM 40 MG: 100 INJECTION SUBCUTANEOUS at 08:50

## 2023-03-06 RX ADMIN — LAMOTRIGINE 125 MG: 100 TABLET ORAL at 08:51

## 2023-03-06 RX ADMIN — METOPROLOL TARTRATE 12.5 MG: 25 TABLET ORAL at 08:51

## 2023-03-06 RX ADMIN — SODIUM CHLORIDE, PRESERVATIVE FREE 10 ML: 5 INJECTION INTRAVENOUS at 08:50

## 2023-03-06 RX ADMIN — MORPHINE SULFATE 30 MG: 30 TABLET, FILM COATED, EXTENDED RELEASE ORAL at 08:51

## 2023-03-06 RX ADMIN — POLYETHYLENE GLYCOL 3350 17 G: 17 POWDER, FOR SOLUTION ORAL at 08:51

## 2023-03-06 RX ADMIN — ASPIRIN 81 MG: 81 TABLET, CHEWABLE ORAL at 08:50

## 2023-03-06 RX ADMIN — DOCUSATE SODIUM 50 MG AND SENNOSIDES 8.6 MG 2 TABLET: 8.6; 5 TABLET, FILM COATED ORAL at 08:50

## 2023-03-06 RX ADMIN — PANTOPRAZOLE SODIUM 40 MG: 40 TABLET, DELAYED RELEASE ORAL at 08:51

## 2023-03-06 ASSESSMENT — PAIN SCALES - GENERAL
PAINLEVEL_OUTOF10: 0
PAINLEVEL_OUTOF10: 7
PAINLEVEL_OUTOF10: 7

## 2023-03-06 ASSESSMENT — PAIN DESCRIPTION - LOCATION: LOCATION: BACK
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
## 2023-03-06 NOTE — PROGRESS NOTES
Pt just left AMA, signed ama paper, refused to wait on her daughter to pick her up, got dressed and started walking down [address] stated she was not going to shelter. Pt educated that she was unsteady and could fall and she was very adamant about leaving. daughter was coming to pick her up at 2pm. pt unsteady as leaving so we got a wheelchair for her to sit in. pt stated she was taking a uber. Daughter called and left a message.  aware and witness to situation.

## 2023-03-06 NOTE — CARE COORDINATION
Transitional planning-contacted Alysha Davidyash with [facility] to come and see the patient. [id number] called hortensia Oden and left message regarding her mother being discharged and that we need to know if she is going to pick her up. Patient has been denied placement. 36 hortensia Oden returned call-she states she will come today to pick her  mother up and take her to her half sister Savanna's home. 1 Mustapha Gaona ex  into visit-wanting to talk with the - went into room and asked patient if I could talk with him-she said yes. He doesn't see how the insurance company denied her placement when she cannot walk-he states she was falling all over the place when she was home. He says we are lying about how far she can walk-she also states she never walked 100ft-we are lying. He states she should just not do anything and see if she can get placed in a facility. He was threatening to vamshi the hospital if she goes somewhere and falls. I explained to him that we submitted the information to the insurance company and they made there decision. She said she couldn't go to her half sister's home. I told him I needed to talk with therapy and read the charting. Called and left message with hortensia Oden. [id number] hortensia Oden returned call and states she will be here around [place].    1230 patient leaving AMA-left message with hortensia Oden. Patient signed AMA paper.

## 2023-03-06 NOTE — PROGRESS NOTES
No calls from  requesting any assistance from palliative care team.  Notes continue to relate patient does not want to speak with palliative care. Met with  today and she confirmed that patient is not interested at this time in speaking with palliative care. Patient's plans are to go home today to stay with step sister. Encouraged Sharon Villafuerte to let us know if there is anything palliative care can do to help.       507 [facility] Coordinator  Consuelo Head BSN, [facility]  [address] [phone]

## 2023-03-06 NOTE — PROGRESS NOTES
Writer went down and got pt from front door pt in bed, plan is to wait on daughter to pick her up at 330pm .

## 2023-03-06 NOTE — PROGRESS NOTES
CLINICAL PHARMACY NOTE: MEDS TO BEDS    Total # of Prescriptions Filled: 3   The following medications were delivered to the patient:  LORAZEPAM 1MG   LAMICTAL 25MG   PERCOCET 5-325     Additional Documentation:     SECOND DELIVERY
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
## 2023-03-07 ENCOUNTER — TELEPHONE (OUTPATIENT)
Dept: PRIMARY CARE CLINIC | Age: 58
End: 2023-03-07

## 2023-03-07 ENCOUNTER — TELEPHONE (OUTPATIENT)
Dept: ONCOLOGY | Age: 58
End: 2023-03-07

## 2023-03-07 ENCOUNTER — CARE COORDINATION (OUTPATIENT)
Dept: CARE COORDINATION | Age: 58
End: 2023-03-07

## 2023-03-07 NOTE — TELEPHONE ENCOUNTER
Name: Thao Vazquez [facility]  : 1965  MRN: [id number]    Oncology Navigation Follow-Up Note    Contact Type:  Telephone    Notes: Upon review of chart noted pt dc'd from [address] yesterday. Noted RAHUL Vines, spoke with pt's daughter this am, discussed need to apply for medicaid, & attempting to assist with SNF placement. Siobhan Johnson [facility] , updated on findings. Will continue to follow.     Electronically signed by Monica Rust RN on 3/7/2023 at 3:47 PM

## 2023-03-07 NOTE — CARE COORDINATION
Writer called Patient's Daughter to Introduce myself and gauge the needs that they have. We discussed at length the issues surrounding the Patient (Needing to go to a SNF, Applying for MEDICAID,). Writer is gathering Documentation needed in order to have Patient considered for Admission into a SNF. Writer will FAX over the Documentation and see what the different facilities say in regards to her Admission.
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
## 2023-03-07 NOTE — TELEPHONE ENCOUNTER
Patient's daughter called, expressed concerns regarding living situation. Would like referral placed to [facility] to see what kind of services may be available to patient.

## 2023-03-08 ENCOUNTER — CARE COORDINATION (OUTPATIENT)
Dept: CARE COORDINATION | Age: 58
End: 2023-03-08

## 2023-03-08 ENCOUNTER — TELEPHONE (OUTPATIENT)
Dept: PRIMARY CARE CLINIC | Age: 58
End: 2023-03-08

## 2023-03-08 DIAGNOSIS — G40.909 SEIZURE DISORDER (HCC): Primary | ICD-10-CM

## 2023-03-08 DIAGNOSIS — I67.83 PRES (POSTERIOR REVERSIBLE ENCEPHALOPATHY SYNDROME): ICD-10-CM

## 2023-03-08 DIAGNOSIS — C56.9 MALIGNANT NEOPLASM OF OVARY, UNSPECIFIED LATERALITY (HCC): ICD-10-CM

## 2023-03-08 DIAGNOSIS — C79.51 CANCER, METASTATIC TO BONE (HCC): ICD-10-CM

## 2023-03-08 DIAGNOSIS — R53.83 OTHER FATIGUE: ICD-10-CM

## 2023-03-08 NOTE — CARE COORDINATION
received a call from Admissions Director from [facility] in regards to a referral I placed on yesterday 03/07/2023 for Patient to be considered for Placement into The SNF. She reported to  that Patient had been conditionally accepted into their Facility for Placement and she would start on the Prior Authorization process for Patient to be admitted. Once she receives Authorization from [employer] she will call cathleenr so I can reach out to Patient's Daughter to Transport her to The SNF.

## 2023-03-08 NOTE — TELEPHONE ENCOUNTER
Care Transitions Initial Follow Up Call    Outreach made within 2 business days of discharge: No    Patient: Avtar Garsia Patient : 1965   MRN: [id number]  Reason for Admission: There are no discharge diagnoses documented for the most recent discharge. Discharge Date: 3/6/23       Spoke with: Braxton Menon    Discharge department/facility: [facility]    TCM Interactive Patient Contact:  Was patient able to fill all prescriptions: Yes  Was patient instructed to bring all medications to the follow-up visit: Yes  Is patient taking all medications as directed in the discharge summary? Yes  Does patient understand their discharge instructions: Yes  Does patient have questions or concerns that need addressed prior to 7-14 day follow up office visit: no    Scheduled appointment with PCP within 7-14 days    Follow Up  Future Appointments   Date Time Provider Rita Parham   3/17/2023  8:10 AM SCHEDULE, [facility]  [facility] [address]   Not ready to schedule trying to get into long term care facility.     Luz Hunt MA
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
## 2023-03-09 ENCOUNTER — TELEPHONE (OUTPATIENT)
Dept: INFUSION THERAPY | Age: 58
End: 2023-03-09

## 2023-03-09 ENCOUNTER — CARE COORDINATION (OUTPATIENT)
Dept: CARE COORDINATION | Age: 58
End: 2023-03-09

## 2023-03-09 DIAGNOSIS — C79.60 MALIGNANT NEOPLASM METASTATIC TO OVARY, UNSPECIFIED LATERALITY (HCC): ICD-10-CM

## 2023-03-09 DIAGNOSIS — C56.9 MALIGNANT NEOPLASM OF OVARY, UNSPECIFIED LATERALITY (HCC): Primary | ICD-10-CM

## 2023-03-09 RX ORDER — MORPHINE SULFATE 15 MG/1
TABLET, FILM COATED, EXTENDED RELEASE ORAL
Qty: 60 TABLET | OUTPATIENT
Start: 2023-03-09

## 2023-03-09 RX ORDER — MORPHINE SULFATE 30 MG/1
TABLET, FILM COATED, EXTENDED RELEASE ORAL
Qty: 60 TABLET | OUTPATIENT
Start: 2023-03-09

## 2023-03-09 RX ORDER — OXYCODONE HYDROCHLORIDE AND ACETAMINOPHEN 5; 325 MG/1; MG/1
TABLET ORAL
Qty: 180 TABLET | OUTPATIENT
Start: 2023-03-09

## 2023-03-09 RX ORDER — MORPHINE SULFATE 15 MG/1
15 TABLET, FILM COATED, EXTENDED RELEASE ORAL 2 TIMES DAILY
Qty: 60 TABLET | Refills: 0 | Status: SHIPPED | OUTPATIENT
Start: 2023-03-09 | End: 2023-04-08

## 2023-03-09 RX ORDER — MORPHINE SULFATE 30 MG/1
30 TABLET, FILM COATED, EXTENDED RELEASE ORAL 2 TIMES DAILY
Qty: 60 TABLET | Refills: 0 | Status: SHIPPED | OUTPATIENT
Start: 2023-03-09 | End: 2023-04-08

## 2023-03-09 NOTE — TELEPHONE ENCOUNTER
Pt left VM on triage line, in need of pain med refills. Noted scripts were routed to Dr Solo Bland by Jenni Torres [address] [place] [address] staff. Call placed to pt to update her, no answer and VM box full.

## 2023-03-10 ENCOUNTER — CARE COORDINATION (OUTPATIENT)
Dept: CARE COORDINATION | Age: 58
End: 2023-03-10

## 2023-03-10 ENCOUNTER — TELEPHONE (OUTPATIENT)
Dept: ONCOLOGY | Age: 58
End: 2023-03-10

## 2023-03-10 ENCOUNTER — TELEPHONE (OUTPATIENT)
Dept: PALLATIVE CARE | Age: 58
End: 2023-03-10

## 2023-03-10 NOTE — TELEPHONE ENCOUNTER
Rec'd call from patient's best friend, Usha Munoz. Friend states patient is staying in a hotel currently and cannot stay there any longer due to financial reasons. She states patient is not well and is incontinent. She states she is trying to help the daughters with finding a place for patient to stay. She has been calling around for 3 days without any assistance or call-backs. Palliative Medicine was consulted on previous admission to hospital (last week) and patient was refusing to speak with us at that time. I instructed friend to take the patient to ER if she is not well. Message sent to nurse navigator as well as oncology social worker. Kamilla Hicks [facility] [id number] [place] [address], [address] [place] [address]

## 2023-03-10 NOTE — TELEPHONE ENCOUNTER
Name: Aldair Mckeon [facility]  : 1965  MRN: [id number]    Oncology Navigation Follow-Up Note    Contact Type:  Telephone    Notes: Galileo Cameron [facility] palliative care nurse, alerted writer received call from pt's friend w/concerns. See Arline's documentation. Attempted to contact pt, no answer, VM requested contact writer. Will continue to follow.     Electronically signed by Anton Castellano RN on 3/10/2023 at 3:23 PM

## 2023-03-10 NOTE — CARE COORDINATION
Writer called over to [facility] to speak with Martir Marie The Admissions Director to see if she had heard anything back from [employer] about Prior Authorization for her to enter their facility. She reported that she had not heard anything as of yet and she would call writer once she receives word from [employer] on if they will approve her admission to the facility.

## 2023-03-10 NOTE — TELEPHONE ENCOUNTER
AURELIO BALLESTEROS'S DTR CALLING ON 3/9/23 REGARDING--DR MARK DID NOT APPROVE PT'S PAIN MEDICATIONS. SEE NOTE 3/10/23, TODAY FROM [facility] AND PT IS CURRENTLY HOMELESS (PER DTR LESLI) AND UNABLE TO CARE FOR HERSELF, SEE NOTE. DTR LESLI STATES FELT THAT HER MOTHER WHEN LAST DISCHARGED WAS NOT ABLE TO WALK AND SHOULD HAVE BEEN PLACED IN A FACILITY AT THAT TIME. SHE LIVES IN [place], BUT STATES PT HAD BEEN STAYING WITH HER SISTER AND FALLING A LOT. PT CAREGIVER HAD BEEN ADVISED PER [facility] TO TAKE PT TO THE ER AND WRITER ALSO NOTIFIED DTR LESLI THAT WOULD BE BEST AND THEN IF PT COULD GO TO A FACILITY AFTER THAT, IT WOULD ASSIST IN FINDING HER A PLACE. DTR STATES SHE IS GOING TO FLY IN TO [place] SOON TO HELP WITH FINDING ASSISTANCE FOR HER MOTHER.

## 2023-03-13 ENCOUNTER — TELEPHONE (OUTPATIENT)
Dept: ONCOLOGY | Age: 58
End: 2023-03-13

## 2023-03-13 DIAGNOSIS — C56.9 MALIGNANT NEOPLASM OF OVARY, UNSPECIFIED LATERALITY (HCC): ICD-10-CM

## 2023-03-13 DIAGNOSIS — C79.51 CANCER, METASTATIC TO BONE (HCC): ICD-10-CM

## 2023-03-13 RX ORDER — OXYCODONE HYDROCHLORIDE AND ACETAMINOPHEN 5; 325 MG/1; MG/1
TABLET ORAL
Qty: 180 TABLET | Refills: 0 | Status: SHIPPED | OUTPATIENT
Start: 2023-03-13 | End: 2023-04-12

## 2023-03-13 NOTE — TELEPHONE ENCOUNTER
Name: Tori Cabrera [facility]  : 1965  MRN: [id number]    Oncology Navigation Follow-Up Note    Contact Type:  Telephone    Notes: Pt left VM after hours inquiring on who to contact for prescription refills. Attempted to contact pt, no answer, VM left notified may contact [facility]/ triage nurse for refills or request pharmacy contact. Inquired on pt's current living situation, encouraged to contact writer or [facility] . Will continue to follow.     Electronically signed by Ashley Ponce RN on 3/13/2023 at 8:44 AM
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
## 2023-03-13 NOTE — TELEPHONE ENCOUNTER
PT CALLING AND DTR CALLING --STATES TAKING PT TO FLA---ANY NEEDS --IE REFERRALS FROM DR MARK NEEDED OR ANY OTHER ORDERS OR INSTRUCTIONS.     TRIED TO CALL DTR AND NO ANSWER--BUT WILL NEED TO SPEAK WITH DR MARK Tuesday, 3/14/23 WHILE IN OFFICE, SO NO INFORMATION AVAILABLE AT THIS TIME FOR DTR, UNTIL SPEAK WITH MD.

## 2023-03-13 NOTE — TELEPHONE ENCOUNTER
PT REQ MD VISIT WITH DR MARK, ALSO SEE OTHER NOTES    ALSO REQ PAIN  MEDS    PAIN MED REFILLS REFUSED    PER DTR, TRYING TO GET PT PLACED IN A FACILITY    WILL CHECK WITH MD IF WANTS PT SCHEDULED, LAST SEEN 1/26/23    CANC OR NO SHOW FOR TREATMENT ON FOLLOWING DATES:  2/2/23, 2/9/23, 2/23/23, 3/9/23 (3/9/23 ALSO MD VISIT)

## 2023-03-14 ENCOUNTER — TELEPHONE (OUTPATIENT)
Dept: INFUSION THERAPY | Facility: MEDICAL CENTER | Age: 58
End: 2023-03-14

## 2023-03-14 ENCOUNTER — TELEPHONE (OUTPATIENT)
Dept: PRIMARY CARE CLINIC | Age: 58
End: 2023-03-14

## 2023-03-14 NOTE — TELEPHONE ENCOUNTER
Miguel Angel Shay from [facility] called regarding a fax she received for patient to be placed at facility    Miguel Angel Shay states that they would need recent H&P and OV from the last 30 days for patient to be approved for admission. Writer informed Eder that [address] w/ PCP was 7/18/2022. Writer also informed that patient's care coordinator, Karen Calvin, has been trying to get patient placed in a SNF. Writer informed Miguel Angel Shay that message will be sent to [facility] to further look into placement for patient.  Miguel Angel Shay verbalized understanding and provided callback number: [phone]    Please further advise

## 2023-03-14 NOTE — TELEPHONE ENCOUNTER
Received message from patient's daughter Navya Carreon. She states patient has been evicted and is now homeless. Navya Carreon lives in [place]. She would like to bring her mother to [place] but has many questions and concerns such as is patient safe to travel to [place]? Can her treatment be given in [place]? Seeking guidance from Dr. Blue Lopez. She is requesting a call back from him ASAP. Will forward this information to Dr. Blue Lopez.

## 2023-03-14 NOTE — TELEPHONE ENCOUNTER
Returned call to patient's daughter Huang De La Cruz and informed her Dr. Romayne Forget will call back at some point today.

## 2023-03-15 ENCOUNTER — TELEPHONE (OUTPATIENT)
Dept: ONCOLOGY | Age: 58
End: 2023-03-15

## 2023-03-15 NOTE — TELEPHONE ENCOUNTER
MESSAGE LEFT ON WRITER'S DESK  FROM DR Stephan Almanzar PT NEEDS A VV TOMORROW. WRITER CALLED PT'S DAUGHTER, LESLI. LESLI STATES SHE IS IN TOWN AND WOULD RATHER SEE THE DOCTOR IN PERSON. PT IS SCHEDULED FOR 03/16/23 @9AM WITH DR Aliyah Garcia.

## 2023-03-16 ENCOUNTER — OFFICE VISIT (OUTPATIENT)
Dept: ONCOLOGY | Age: 58
End: 2023-03-16
Payer: COMMERCIAL

## 2023-03-16 ENCOUNTER — TELEPHONE (OUTPATIENT)
Dept: ONCOLOGY | Age: 58
End: 2023-03-16

## 2023-03-16 ENCOUNTER — CARE COORDINATION (OUTPATIENT)
Dept: CARE COORDINATION | Age: 58
End: 2023-03-16

## 2023-03-16 VITALS
HEART RATE: 72 BPM | SYSTOLIC BLOOD PRESSURE: 118 MMHG | WEIGHT: 143.4 LBS | DIASTOLIC BLOOD PRESSURE: 61 MMHG | RESPIRATION RATE: 16 BRPM | TEMPERATURE: 99 F | BODY MASS INDEX: 23.86 KG/M2

## 2023-03-16 DIAGNOSIS — C56.9: Primary | ICD-10-CM

## 2023-03-16 DIAGNOSIS — C79.60 MALIGNANT NEOPLASM METASTATIC TO OVARY, UNSPECIFIED LATERALITY (HCC): ICD-10-CM

## 2023-03-16 DIAGNOSIS — C56.9 MALIGNANT NEOPLASM OF OVARY, UNSPECIFIED LATERALITY (HCC): ICD-10-CM

## 2023-03-16 PROCEDURE — 3017F COLORECTAL CA SCREEN DOC REV: CPT | Performed by: INTERNAL MEDICINE

## 2023-03-16 PROCEDURE — 99214 OFFICE O/P EST MOD 30 MIN: CPT | Performed by: INTERNAL MEDICINE

## 2023-03-16 PROCEDURE — G8428 CUR MEDS NOT DOCUMENT: HCPCS | Performed by: INTERNAL MEDICINE

## 2023-03-16 PROCEDURE — G8420 CALC BMI NORM PARAMETERS: HCPCS | Performed by: INTERNAL MEDICINE

## 2023-03-16 PROCEDURE — G8484 FLU IMMUNIZE NO ADMIN: HCPCS | Performed by: INTERNAL MEDICINE

## 2023-03-16 PROCEDURE — 99211 OFF/OP EST MAY X REQ PHY/QHP: CPT | Performed by: INTERNAL MEDICINE

## 2023-03-16 PROCEDURE — 1111F DSCHRG MED/CURRENT MED MERGE: CPT | Performed by: INTERNAL MEDICINE

## 2023-03-16 PROCEDURE — 4004F PT TOBACCO SCREEN RCVD TLK: CPT | Performed by: INTERNAL MEDICINE

## 2023-03-16 RX ORDER — DICYCLOMINE HCL 20 MG
TABLET ORAL
Qty: 60 TABLET | Refills: 3 | Status: SHIPPED | OUTPATIENT
Start: 2023-03-16

## 2023-03-16 RX ORDER — MORPHINE SULFATE 15 MG/1
15 TABLET, FILM COATED, EXTENDED RELEASE ORAL 3 TIMES DAILY
Qty: 90 TABLET | Refills: 0 | Status: SHIPPED | OUTPATIENT
Start: 2023-03-16 | End: 2023-04-15

## 2023-03-16 RX ORDER — CYCLOBENZAPRINE HCL 5 MG
5 TABLET ORAL 2 TIMES DAILY PRN
Qty: 60 TABLET | Refills: 1 | Status: SHIPPED | OUTPATIENT
Start: 2023-03-16 | End: 2023-04-15

## 2023-03-16 NOTE — TELEPHONE ENCOUNTER
Idania Cowan FOR MD VISIT  DR Payal Horton IN TO SEE PATIENT  ORDERS RECEIVED  MOVING TO [place]  RECORDS TO PATIENT  RV PRN  PT LEFT WITHOUT STOPPING, UNABLE TO GIVE ANY RECORDS  AVS PRINTED AND MAILED TO PATIENT WITH INSTRUCTIONS  PATIENT DISCHARGED VIA WHEELCHAIR

## 2023-03-16 NOTE — CARE COORDINATION
received a call from The Admissions Director at [facility] she reported to  that they were going to have to Pass on admitting Patient as she had found out that they were Not In Network with [facility].  understood and Thanked her for their Consideration of Admission for Patient;  then received a call from My colleague who said that The Admissions Director from [facility] had called for me; Nu Mauro returned her call and left her a voicemail asking for a return call.
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
## 2023-03-17 ENCOUNTER — TELEPHONE (OUTPATIENT)
Dept: ONCOLOGY | Age: 58
End: 2023-03-17

## 2023-03-17 ENCOUNTER — CARE COORDINATION (OUTPATIENT)
Dept: CARE COORDINATION | Age: 58
End: 2023-03-17

## 2023-03-17 ENCOUNTER — TELEPHONE (OUTPATIENT)
Dept: PRIMARY CARE CLINIC | Age: 58
End: 2023-03-17
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
## 2023-03-17 NOTE — TELEPHONE ENCOUNTER
Name: Karli Nash [facility]  : 1965  MRN: [id number]    Oncology Navigation Follow-Up Note    Contact Type:  Telephone    Notes: Dr. Jayant Randolph alerted writer pt completed f/u yesterday & notified moving to [place] w/dtr. Spoke with pt for f/u. Pt stated currently in [place]. Notified navigation ended & may contact writer prn.       Electronically signed by Fadumo Penaloza RN on 3/17/2023 at 3:32 PM t
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
## 2023-03-17 NOTE — CARE COORDINATION
received a call from Kiara the Admissions Director at [facility] requesting Paperwork for Patient from her 02/19/2023 Hospital Stay to consider her for Admission to their SNF.     in turn reached out to The Patient's PCP Medical Assistant and requested that they Fax the Paperwork to Kiara at The Number Provided to me by her .     will continue to try and assist in this process however I am needed to do so.

## 2023-03-21 DIAGNOSIS — C56.9: Primary | ICD-10-CM

## 2023-03-21 DIAGNOSIS — C79.51 CANCER, METASTATIC TO BONE (HCC): ICD-10-CM

## 2023-03-21 NOTE — TELEPHONE ENCOUNTER
PT SEEN PER DR MARK ON 3/16/23, SCRIPT FOR ZOFRAN SENT TO [facility] ON 3/5/23 WITH 1 REFILL, LEFT MESSAGE AT [facility] , THAT PT REQ NEW SCRIPT TO [facility] [id number] IN [facility] [id number]  NEW SCRIPT PENDED FOR PT

## 2023-03-22 DIAGNOSIS — G40.909 SEIZURE DISORDER (HCC): ICD-10-CM

## 2023-03-22 RX ORDER — ONDANSETRON 4 MG/1
4 TABLET, ORALLY DISINTEGRATING ORAL EVERY 8 HOURS PRN
Qty: 60 TABLET | Refills: 1 | Status: SHIPPED | OUTPATIENT
Start: 2023-03-22

## 2023-03-22 RX ORDER — LACOSAMIDE 150 MG/1
150 TABLET ORAL 2 TIMES DAILY
Qty: 60 TABLET | Refills: 1 | Status: SHIPPED | OUTPATIENT
Start: 2023-03-22 | End: 2026-03-23

## 2023-03-22 RX ORDER — MECLIZINE HCL 12.5 MG/1
12.5 TABLET ORAL 3 TIMES DAILY PRN
Qty: 30 TABLET | Refills: 0 | Status: SHIPPED | OUTPATIENT
Start: 2023-03-22 | End: 2023-04-01

## 2023-03-22 RX ORDER — SERTRALINE HYDROCHLORIDE 100 MG/1
100 TABLET, FILM COATED ORAL DAILY
Qty: 30 TABLET | Refills: 2 | Status: SHIPPED | OUTPATIENT
Start: 2023-03-22

## 2023-03-22 RX ORDER — ATORVASTATIN CALCIUM 40 MG/1
40 TABLET, FILM COATED ORAL NIGHTLY
Qty: 30 TABLET | Refills: 1 | Status: SHIPPED | OUTPATIENT
Start: 2023-03-22
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
## 2023-03-31 ENCOUNTER — CARE COORDINATION (OUTPATIENT)
Dept: CARE COORDINATION | Age: 58
End: 2023-03-31

## 2023-03-31 NOTE — CARE COORDINATION
Writer called The Admissions Director at [facility] to follow up on Patient. The Admissions Director did not answer the phone, Jenny Velasco left a voicemail asking for a return call.

## 2023-04-06 ENCOUNTER — CARE COORDINATION (OUTPATIENT)
Dept: CARE COORDINATION | Age: 58
End: 2023-04-06
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
## 2023-04-11 NOTE — TELEPHONE ENCOUNTER
Subjective:       Patient ID: Antonio Galvan II is a 53 y.o. male.    Chief Complaint: Pressure Ulcer ((R ischium with wound vac, L trochanter, R heel, R anterior ankle pressure ulcers.  Reports wound vac stayed suctioning since last visit.  Here for re-evaluation and treatment)    HPI  Review of Systems      Objective:      Temp:  [97.8 °F (36.6 °C)]   Pulse:  [80]   Resp:  [18]   BP: (135)/(87)   SpO2:  [100 %]   Physical Exam       Negative Pressure Wound Therapy  03/20/23 1500 Right (Active)   03/20/23 1500   Side: Right   Orientation:    Location: Ischial tuberosity   Additional Comments:    Removal Indication and Assessment:    Location:    SDO Location:    NPWT Type Vacuum Therapy 04/11/23 1435   Therapy Setting NPWT Continuous therapy 04/11/23 1435   Pressure Setting NPWT 125 mmHg 04/11/23 1435   Therapy Interventions NPWT Dressing changed;Seal intact 04/11/23 1435   Sponges Inserted NPWT Black;2 04/11/23 1435   Sponges Removed NPWT Black;2 04/11/23 1435            Altered Skin Integrity 05/06/22 1140 Right Heel  Full thickness tissue loss. Subcutaneous fat may be visible but bone, tendon or muscle are not exposed (Active)   05/06/22 1140   Altered Skin Integrity Present on Admission - Did Patient arrive to the hospital with altered skin?: yes   Side: Right   Orientation:    Location: Heel   Wound Number:    Is this injury device related?: No   Primary Wound Type:    Description of Altered Skin Integrity: Full thickness tissue loss. Subcutaneous fat may be visible but bone, tendon or muscle are not exposed   Ankle-Brachial Index:    Pulses:    Removal Indication and Assessment:    Wound Outcome:    (Retired) Wound Length (cm):    (Retired) Wound Width (cm):    (Retired) Depth (cm):    Wound Description (Comments):    Removal Indications:    Wound Image   04/11/23 1435   Dressing Appearance Intact;Moist drainage 04/11/23 1435   Drainage Amount Moderate 04/11/23 1435   Drainage Characteristics/Odor  Returned call to patient daughter as she called to discuss further treatment vs palliative care vs hospice care for her mother. Explained that per Dr. Eduard Beth note no chemo treatment while hospitalized and future plan will need to be determined based on outcome of current health issues. Explained that palliative care could be able to assist in clarifying palliative care plans versus hospice plans. Daughter expresses understanding and will return call to palliative care and speak with her mother. Sanguineous;Bleeding controlled;No odor 04/11/23 1435   Appearance Red;Granulating;Ecchymotic 04/11/23 1435   Tissue loss description Full thickness 04/11/23 1435   Periwound Area Scar tissue 04/11/23 1435   Wound Edges Defined 04/11/23 1435   Wound Length (cm) 5.1 cm 04/11/23 1435   Wound Width (cm) 4 cm 04/11/23 1435   Wound Depth (cm) 0.1 cm 04/11/23 1435   Wound Volume (cm^3) 2.04 cm^3 04/11/23 1435   Wound Surface Area (cm^2) 20.4 cm^2 04/11/23 1435   Care Cleansed with:;Sterile normal saline;Applied:;Other (see comments) 04/11/23 1435   Dressing Applied;Gauze;Absorptive Pad;Rolled gauze;Tubular bandage 04/11/23 1435   Periwound Care Skin barrier film applied 04/11/23 1435   Compression Tubular elasticized bandage 04/11/23 1435            Altered Skin Integrity 01/12/23 1530 Sacral spine Full thickness tissue loss with exposed bone, tendon, or muscle. Often includes undermining and tunneling. May extend into muscle and/or supporting structures. (Active)   01/12/23 1530   Altered Skin Integrity Present on Admission - Did Patient arrive to the hospital with altered skin?:    Side:    Orientation:    Location: Sacral spine   Wound Number:    Is this injury device related?:    Primary Wound Type:    Description of Altered Skin Integrity: Full thickness tissue loss with exposed bone, tendon, or muscle. Often includes undermining and tunneling. May extend into muscle and/or supporting structures.   Ankle-Brachial Index:    Pulses:    Removal Indication and Assessment:    Wound Outcome:    (Retired) Wound Length (cm):    (Retired) Wound Width (cm):    (Retired) Depth (cm):    Wound Description (Comments):    Removal Indications:    Wound Image   04/11/23 1435   Description of Altered Skin Integrity Full thickness tissue loss. Subcutaneous fat may be visible but bone, tendon or muscle are not exposed 04/11/23 1435   Dressing Appearance Intact;Moist drainage 04/11/23 1435   Drainage Amount Moderate 04/11/23 1435    Drainage Characteristics/Odor Serosanguineous 04/11/23 1435   Appearance Pink;Red;Tan;White;Granulating;Slough 04/11/23 1435   Tissue loss description Full thickness 04/11/23 1435   Periwound Area Macerated;Denuded 04/11/23 1435   Wound Edges Irregular 04/11/23 1435   Wound Length (cm) 5 cm 04/11/23 1435   Wound Width (cm) 4.5 cm 04/11/23 1435   Wound Depth (cm) 0.1 cm 04/11/23 1435   Wound Volume (cm^3) 2.25 cm^3 04/11/23 1435   Wound Surface Area (cm^2) 22.5 cm^2 04/11/23 1435   Care Cleansed with:;Sterile normal saline;Applied:;Skin Barrier 04/11/23 1435   Dressing Applied;Sodium chloride impregnated;Gauze;Absorptive Pad;Other (comment) 04/11/23 1435   Periwound Care Skin barrier film applied 04/11/23 1435            Altered Skin Integrity 01/12/23 1532 Right Ischial tuberosity Full thickness tissue loss with exposed bone, tendon, or muscle. Often includes undermining and tunneling. May extend into muscle and/or supporting structures. (Active)   01/12/23 1532   Altered Skin Integrity Present on Admission - Did Patient arrive to the hospital with altered skin?:    Side: Right   Orientation:    Location: Ischial tuberosity   Wound Number:    Is this injury device related?:    Primary Wound Type:    Description of Altered Skin Integrity: Full thickness tissue loss with exposed bone, tendon, or muscle. Often includes undermining and tunneling. May extend into muscle and/or supporting structures.   Ankle-Brachial Index:    Pulses:    Removal Indication and Assessment:    Wound Outcome:    (Retired) Wound Length (cm):    (Retired) Wound Width (cm):    (Retired) Depth (cm):    Wound Description (Comments):    Removal Indications:    Wound Image   04/11/23 1435   Dressing Appearance Intact;Moist drainage 04/11/23 1435   Drainage Amount Moderate 04/11/23 1435   Drainage Characteristics/Odor Serosanguineous 04/11/23 1435   Appearance Red;Granulating;Tan;Gray;Black;Necrotic;Slough 04/11/23 1435   Tissue loss description  Full thickness 04/11/23 1435   Red (%), Wound Tissue Color 50 % 04/11/23 1435   Yellow (%), Wound Tissue Color 50 % 04/11/23 1435   Periwound Area Scar tissue;Denuded 04/11/23 1435   Wound Edges Defined 04/11/23 1435   Wound Length (cm) 15 cm 04/11/23 1435   Wound Width (cm) 14.5 cm 04/11/23 1435   Wound Depth (cm) 3.6 cm 04/11/23 1435   Wound Volume (cm^3) 783 cm^3 04/11/23 1435   Wound Surface Area (cm^2) 217.5 cm^2 04/11/23 1435   Care Cleansed with:;Sterile normal saline 04/11/23 1435   Dressing Applied;Other (comment) 04/11/23 1435   Periwound Care Moisture barrier applied;Hydrocolloid barrier applied 04/11/23 1435            Altered Skin Integrity 02/09/23 1324 Right anterior Ankle Ulceration Full thickness tissue loss. Subcutaneous fat may be visible but bone, tendon or muscle are not exposed (Active)   02/09/23 1324   Altered Skin Integrity Present on Admission - Did Patient arrive to the hospital with altered skin?: yes   Side: Right   Orientation: anterior   Location: Ankle   Wound Number:    Is this injury device related?:    Primary Wound Type: Ulceration   Description of Altered Skin Integrity: Full thickness tissue loss. Subcutaneous fat may be visible but bone, tendon or muscle are not exposed   Ankle-Brachial Index:    Pulses:    Removal Indication and Assessment:    Wound Outcome:    (Retired) Wound Length (cm):    (Retired) Wound Width (cm):    (Retired) Depth (cm):    Wound Description (Comments):    Removal Indications:    Wound Image   04/11/23 1435   Dressing Appearance Intact;Dry 04/11/23 1435   Drainage Amount None 04/11/23 1435   Appearance Black;Dry 04/11/23 1435   Black (%), Wound Tissue Color 100 % 04/11/23 1435   Periwound Area Intact 04/11/23 1435   Wound Edges Defined 04/11/23 1435   Wound Length (cm) 1.5 cm 04/11/23 1435   Wound Width (cm) 1 cm 04/11/23 1435   Wound Surface Area (cm^2) 1.5 cm^2 04/11/23 1435   Care Cleansed with:;Sterile normal saline 04/11/23 1435   Dressing  Applied;Bandaid 04/11/23 1435            Altered Skin Integrity Left Greater trochanter Full thickness tissue loss. Base is covered by slough and/or eschar in the wound bed (Active)       Altered Skin Integrity Present on Admission - Did Patient arrive to the hospital with altered skin?:    Side: Left   Orientation:    Location: Greater trochanter   Wound Number:    Is this injury device related?:    Primary Wound Type:    Description of Altered Skin Integrity: Full thickness tissue loss. Base is covered by slough and/or eschar in the wound bed   Ankle-Brachial Index:    Pulses:    Removal Indication and Assessment:    Wound Outcome:    (Retired) Wound Length (cm):    (Retired) Wound Width (cm):    (Retired) Depth (cm):    Wound Description (Comments):    Removal Indications:    Wound Image   04/11/23 1435   Description of Altered Skin Integrity Full thickness tissue loss. Base is covered by slough and/or eschar in the wound bed 04/11/23 1435   Dressing Appearance Intact;Moist drainage 04/11/23 1435   Drainage Amount Moderate 04/11/23 1435   Drainage Characteristics/Odor Malodorous;Creamy;Serosanguineous 04/11/23 1435   Appearance Pink;Red;Granulating;Yellow;Gray;Slough 04/11/23 1435   Tissue loss description Full thickness 04/11/23 1435   Red (%), Wound Tissue Color 60 % 04/11/23 1435   Yellow (%), Wound Tissue Color 40 % 04/11/23 1435   Periwound Area Intact 04/11/23 1435   Wound Edges Defined 04/11/23 1435   Wound Length (cm) 4.8 cm 04/11/23 1435   Wound Width (cm) 4.3 cm 04/11/23 1435   Wound Depth (cm) 1.9 cm 04/11/23 1435   Wound Volume (cm^3) 39.216 cm^3 04/11/23 1435   Wound Surface Area (cm^2) 20.64 cm^2 04/11/23 1435   Undermining (depth (cm)/location) 2.6cm at 8 o'clock/ undermining from 6-9 o'clock 04/11/23 1435   Care Cleansed with:;Sterile normal saline 04/11/23 1435   Dressing Applied;Sodium chloride impregnated;Gauze;Absorptive Pad;Other (comment) 04/11/23 1435   [facility] Skin barrier film  applied 04/11/23 1295         Assessment:     Today all wounds appear to have some improvement.  There was some bleeding of the heel today and quick clot was applied.  Opticel was applied to the small bleeding area of the right ischium.  Flagyl was applied before wound VAC was applied.  Collagen Ag was applied to the right heel and right anterior ankle.  Santyl Mesalt applied to the left trochanter and sacrum.  Santyl and black granular foam was applied to the right ischium.  Patient will offload as much as possible.  Tubigrip F to right lower extremity.  Patient will return on Friday for a dressing changes.    ICD-10-CM ICD-9-CM   1. 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 Pressure injury of right ischium, stage 4  L89.314 707.05     707.24   3. Pressure injury of right heel, stage 3  L89.613 707.07     707.23   4. Pressure injury of right ankle, stage 3  L89.513 707.06     707.23   5. Pressure injury of trochanteric region of left hip, stage 4  L89.224 707.04     707.24         Plan:   Tissue pathology and/or culture taken:  [] Yes [x] No   Sharp debridement performed:   [] Yes [x] No   Labs ordered this visit:   [] Yes [x] No   Imaging ordered this visit:   [] Yes [x] No           Orders Placed This Encounter   Procedures    Change dressing     Periwound care: Skin prep periwounds; Hydrocolloid barrier to R ischium periwound   Primary dressing: Collagen Ag to R heel and R anterior ankle. Santyl, Mesalt to L trochanter and sacrum   Santyl and Black Granufoam to R ischium.   Secondary dressing: Gauze, abd pad, kerlix, secure with Retention tape to R heel/R ankle/ sacrum/coccyx.  Gauze, abd, secure with retention tape to L trochanter. Transparent film to R ischium. Gauze, abd pad, secure with retention tape to coccyx   Offloading: Offload as much as possible   Edema control: Tubigrip F to RLE   Frequency: QOD to RLE. Daily to L  trochanter and coccyx   Follow-up: MD Visit on Tuesday  / Nurse visit Thursday   Other Orders: NPWT @ -125mmHg to R ischium.   Apply crushed Metronidazole to R ischium wound bed base prior to Wound vac application.     Standing Status:   Standing     Number of Occurrences:   1        Follow up in about 3 days (around 4/14/2023) for nurse visit Friday, md visit 1 week. .

## 2023-05-04 ENCOUNTER — CARE COORDINATION (OUTPATIENT)
Dept: CARE COORDINATION | Age: 58
End: 2023-05-04

## 2023-05-04 NOTE — CARE COORDINATION
Writer called Patient's phone to do a follow up Social service call with her. Patient did not answer the phone. Writer was able to reach Patient's Daughter who gave writer an update on Patient. Patient was in [place] visiting and became sick and from all accounts is not doing well at this time. Patient is in the Hospital down in [place] and it is unknown how long she will be there. Writer will keep the case open as writer was told that if Patient is discharged from the Hospital there and comes back to [facility] she will need to be placed in a SNF here In [facility]. Writer explained to Patient's DAUGHTER THAT I can only keep case open for a certain Period of time and they can always Re-open her case once Patient comes back to [facility]. Writer will keep it open for just a little longer and if it looks like Patient won't be coming back anytime soon writer will sign off at that time.

## 2023-05-24 ENCOUNTER — CARE COORDINATION (OUTPATIENT)
Dept: CARE COORDINATION | Age: 58
End: 2023-05-24

## 2023-05-24 NOTE — CARE COORDINATION
Writer spoke to [employer] Daughter and as a result writer is signing off on the Case at this time. Patient is in [place] and things are touch and go with Patient's Health at this point. It is not Known if Patient is returning to [facility]. If Patient returns they are more than welcome to Re-engage with Services if they find that they need to do so.

## 2023-07-14 NOTE — PROGRESS NOTES
Pt here for [facility]. Labs drawn from port and reviewed. WBC 14.6. Spoke with Dr Davis Buckner and he states he will order her a Z pac. Concerns of when her next treatment addressed with Gayathri Hernández and she will talk with Dr Davis Buckner to update schedule. New schedule given to Wilbert Means. Denies any problems. Tolerates treatment well. Will return 9/17 for Dr biggs and Ron Stanford. Discharged in stable condition with daughter. negative

## 2023-11-05 NOTE — PLAN OF CARE
Problem: Respiratory - Adult  Goal: Achieves optimal ventilation and oxygenation  10/6/2022 1950 by Maureen Nuñez RN  Outcome: Progressing  10/6/2022 0833 by James Jones RN  Outcome: Progressing  Flowsheets (Taken 10/6/2022 0800)  Achieves optimal ventilation and oxygenation: Assess for changes in respiratory status     Problem: Discharge Planning  Goal: Discharge to home or other facility with appropriate resources  10/6/2022 1950 by Maureen Nuñez RN  Outcome: Progressing  10/6/2022 0833 by James Jones RN  Outcome: Progressing  Flowsheets (Taken 10/6/2022 0800)  Discharge to home or other facility with appropriate resources: Identify barriers to discharge with patient and caregiver     Problem: Safety - Adult  Goal: Free from fall injury  10/6/2022 1950 by Maureen Nuñez RN  Outcome: Progressing  10/6/2022 0833 by James Jones RN  Outcome: Progressing     Problem: ABCDS Injury Assessment  Goal: Absence of physical injury  10/6/2022 1950 by Maureen Nuñez RN  Outcome: Progressing  10/6/2022 0833 by James Jones RN  Outcome: Progressing     Problem: Skin/Tissue Integrity  Goal: Absence of new skin breakdown  Description: 1. Monitor for areas of redness and/or skin breakdown  2. Assess vascular access sites hourly  3. Every 4-6 hours minimum:  Change oxygen saturation probe site  4. Every 4-6 hours:  If on nasal continuous positive airway pressure, respiratory therapy assess nares and determine need for appliance change or resting period.   10/6/2022 1950 by Maureen Nuñez RN  Outcome: Progressing  10/6/2022 0833 by James Jones RN  Outcome: Progressing     Problem: Pain  Goal: Verbalizes/displays adequate comfort level or baseline comfort level  10/6/2022 1950 by Maureen Nuñez RN  Outcome: Progressing  10/6/2022 0833 by James Jones RN  Outcome: Progressing     Problem: Chronic Conditions and Co-morbidities  Goal: Patient's chronic conditions and Take medications as prescribed.  Drink plenty of fluids.  Return to the emergency department for worsening/persistent symptoms, fever, or other concern.   co-morbidity symptoms are monitored and maintained or improved  10/6/2022 1950 by Markos Allen RN  Outcome: Progressing  10/6/2022 0833 by Donna Perdomo RN  Outcome: Progressing  Flowsheets (Taken 10/6/2022 0800)  Care Plan - Patient's Chronic Conditions and Co-Morbidity Symptoms are Monitored and Maintained or Improved: Monitor and assess patient's chronic conditions and comorbid symptoms for stability, deterioration, or improvement

## 2024-03-08 NOTE — PROGRESS NOTES
INTENSIVE CARE UNIT  Resident Physician Progress Note    Patient - Zana Faust [facility]  Date of Admission -  10/3/2022  1:07 PM  Date of Evaluation -  10/4/2022  Room and Bed Number -  8309/5211-71   Hospital Day - 1    SUBJECTIVE:   HISTORY OF PRESENT ILLNESS:    Patient is a 57-year-old female with past medical history of metastatic ovarian cancer with multiple recurrences presented to the emergency department with altered mental status. Was found by daughter very drowsy on 10/2, daughters that she was tired and let her sleep. On return patient was increasingly lethargic and altered, daughter called 911. Patient came to the emergency department with EMS with a GCS of 8. She was found to be febrile and tachycardic, she was intubated for airway protection sepsis work-up was initiated in the emergency department. Patient was given 2 L fluid bolus in the emergency department, was hypertensive and tachycardic, agitated with the endotracheal tube. CT head was negative for any acute processes including cranial metastases. Initial VBG with pH of 7.47, PCO2 of 34.1, bicarb of 25.2 and a lactic acid of 1.56. Patient was started on broad-spectrum antibiotics and see department. Patient did have history of intra-abdominal bleeding secondary to splenic mass with GI infiltration, status postembolization of the spleen. OVERNIGHT EVENTS:      Remains intubated and sedated  Patient became bradycardic on Precedex-weaning off  Keppra level came back low-we will bolus with 3 g of Keppra and started on 500 twice daily. More awake and following commands this morning.   CT abdomen notable for partial small bowel obstruction secondary to metastatic lesion, general surgery is following, no acute surgical intervention  Remains on Vancomycin and Zosyn   PRVC 16/460/5/30%  ABG 7.4/40/104/25    OBJECTIVE:   VITAL SIGNS:  Patient Vitals for the past 8 hrs:   BP Temp Temp src Pulse Resp SpO2 Weight   10/04/22 1000 115/84 -- -- 73 22 94 % --   10/04/22 0900 119/61 -- -- 62 20 93 % --   10/04/22 0800 (!) 110/59 -- -- (!) 47 16 95 % --   10/04/22 0739 -- -- -- 61 20 96 % --   10/04/22 0729 -- -- -- 61 20 98 % --   10/04/22 0700 (!) 111/56 -- -- (!) 47 16 97 % --   10/04/22 0600 (!) 104/57 98.4 °F (36.9 °C) Bladder (!) 47 16 96 % 153 lb 14.1 oz (69.8 kg)   10/04/22 0500 118/63 -- -- (!) 48 16 96 % --   10/04/22 0400 116/61 98.2 °F (36.8 °C) Bladder (!) 47 16 99 % --   10/04/22 0339 -- -- -- (!) 48 16 100 % --   10/04/22 0300 (!) 113/54 -- -- (!) 48 16 99 % --       Tmax over 24 hours: Temp (24hrs), Av.4 °F (37.4 °C), Min:98.2 °F (36.8 °C), Max:100.9 °F (38.3 °C)      Ins/Outs: In: 3293.4 [I.V.:2532.7;  Other:100; NG/GT:100]  Out: 2462 [Urine:2162]      PHYSICAL EXAM:  General appearance - Intubated, sedated, opening eyes and following simple commands   HEENT: Normocephalic, Atraumatic, Conjuctiva pink, PERRL, Oral mucosa normal, and Trachea midline  Chest - mechanically ventilated, equal breath sounds bilaterally   Cardiovascular - normal rate, regular rhythm, normal S1, S2, no murmurs, rubs, clicks or gallops  Abdomen - soft, nontender, nondistended, no masses or organomegaly  Previous surgical scar noted    Neurological -  intubated and sedated, opens eyes, follows simple commands, moving all 4 extremities   Integumentary - Skin color, texture, turgor normal. No Rashes or lesions  Musculoskeletal -Full ROM times 4 extremities, No clubbing or cyanosis, and No peripheral edema    MEDICATIONS:  Scheduled Meds:   docusate  100 mg Oral Daily    levETIRAcetam  500 mg IntraVENous Q12H    chlorhexidine  15 mL Mouth/Throat BID    sodium chloride flush  5-40 mL IntraVENous 2 times per day    enoxaparin  40 mg SubCUTAneous Daily    famotidine (PEPCID) injection  20 mg IntraVENous BID    piperacillin-tazobactam  3,375 mg IntraVENous Q8H    vancomycin (VANCOCIN) intermittent dosing (placeholder)   Other RX Placeholder    vancomycin  750 mg IntraVENous Q8H       Continuous Infusions:   propofol Stopped (10/04/22 0819)    fentaNYL 50 mcg/mL 100 mcg/hr (10/04/22 0722)    dexmedetomidine 0.4 mcg/kg/hr (10/04/22 0944)    sodium chloride Stopped (10/04/22 0056)    sodium chloride 100 mL/hr at 10/04/22 0534       PRN Meds:   sodium chloride flush, 5-40 mL, PRN  sodium chloride, , PRN  ondansetron, 4 mg, Q8H PRN   Or  ondansetron, 4 mg, Q6H PRN  polyethylene glycol, 17 g, Daily PRN  acetaminophen, 650 mg, Q6H PRN   Or  acetaminophen, 650 mg, Q6H PRN  potassium chloride, 20 mEq, PRN   Or  potassium chloride, 10 mEq, PRN      SUPPORT DEVICES:   [x] Ventilator [] BIPAP   [] HiFlo Nasal Cannula  [] Nasal Cannula   [] Room Air  VENT SETTINGS (Comprehensive) (if applicable):  Vent Information  Ventilator Day(s): 1  Ventilator ID: [id number]  Equipment Changed: Expiratory Filter, HME  Ventilator Initiate: Yes  Vent Mode: AC/PRVC  Additional Respiratory Assessments  Heart Rate: 73  Resp: 22  SpO2: 94 %  End Tidal CO2: 35 (%)  Position: Semi-Jolley's  Humidification Source: E  Lab Results   Component Value Date/Time    MODE [facility] 10/04/2022 04:16 AM       ABGs:   Arterial Blood Gas result:  pH 7.4; pCO2 40; pO2 104; HCO3 25;  FiO2 30%    DATA:  Complete Blood Count:   Recent Labs     10/03/22  1339 10/04/22  0546   WBC 10.2 6.9   RBC 4.88 3.97   HGB 14.2 11.5*   HCT 44.5 36.9   MCV 91.2 92.9   MCH 29.1 29.0   MCHC 31.9 31.2   RDW 17.1* 17.2*    174   MPV 9.4 9.9        PT/INR:    Lab Results   Component Value Date/Time    PROTIME 12.2 05/03/2022 05:28 PM    INR 1.2 05/03/2022 05:28 PM     PTT:    Lab Results   Component Value Date/Time    APTT 19.5 05/03/2022 05:28 PM       Basic Metabolic Profile:   Recent Labs     10/03/22  1314 10/03/22  1341 10/04/22  0546   NA  --  140 134*   K  --  4.2 3.6*   CL  --  100 104   CO2  --  26 21   BUN  --  8 6   CREATININE 0.38* 0.42* 0.28*   GLUCOSE  --  158* 123*       Magnesium:   Lab Results   Component Value Date/Time    MG 1.5 10/03/2022 01:41 PM    MG 1.6 05/03/2022 05:50 PM    MG 1.8 02/25/2022 06:10 AM     Phosphorus:   Lab Results   Component Value Date/Time    PHOS 3.6 06/22/2021 04:14 AM    PHOS 2.7 06/21/2021 04:49 AM    PHOS 2.8 06/20/2021 03:53 AM     Ionized Calcium:   Lab Results   Component Value Date/Time    CAION 1.07 06/19/2021 12:00 AM    CAION 1.17 01/14/2021 05:08 AM    CAION 1.14 01/13/2021 06:00 AM        Radiology/Imaging:  CT HEAD WO CONTRAST    Result Date: 10/3/2022  1. No acute intracranial abnormality. 2. No evidence for significant stenosis or occlusion. 3. Consolidation in the visualized lungs bilaterally that is worse on the right compared to the left. Correlate with report from dedicated CT chest, abdomen, and pelvis for further discussion. XR CHEST PORTABLE    Result Date: 10/3/2022  1. Endotracheal tube tip lies 5 cm above the alejandro. 2. Enteric tube in the stomach with the tip at the level of the proximal to mid gastric body. 3. Stable right mid I a frame elevation resulting in right hemithorax volume loss. 4. There is progressive curvilinear right lung base opacity which may represent progressive atelectasis. 5. Stable perihilar interstitial reticulonodular densities. No acute consolidation or pulmonary edema. CTA HEAD NECK W CONTRAST    Result Date: 10/3/2022  1. No acute intracranial abnormality. 2. No evidence for significant stenosis or occlusion. 3. Consolidation in the visualized lungs bilaterally that is worse on the right compared to the left. Correlate with report from dedicated CT chest, abdomen, and pelvis for further discussion. CT CHEST ABDOMEN PELVIS W CONTRAST    Result Date: 10/3/2022  1. Progressive bibasilar and left lower lobe consolidative change which may represent atelectasis versus superimposed pneumonia. 2. Redemonstration of thoracic and abdominal metastatic disease with mixed change.   Most lesions appear stable with a few interval progression (right axillary metastatic lymphadenopathy. Retroperitoneal left periaortic metastatic lymphadenopathy, pelvic presacral cystic mass). 3. Redemonstration of peritoneal carcinomatosis. No significant ascites. 4. Stable blastic skeletal metastasis. 5. Moderate transverse and descending colonic distention with liquid stool. Abrupt transition in the mid sigmoid colon which may relate to malignant partial obstruction or stricture. Mild interval improvement of colonic edema. No evidence of colonic perforation or pneumatosis.        ASSESSMENT:     Patient Active Problem List    Diagnosis Date Noted    Septicemia ([address] 10/03/2022    Iron deficiency     Leg swelling 13/14/4739    Acute metabolic encephalopathy     Confusion     Urinary tract infection without hematuria     Seizure disorder (HCC)     Breakthrough seizure ([address]     Lactic acidosis 02/21/2022    Diabetes mellitus ([address] 02/21/2022    Ileus ([address] 02/21/2022    Pericardial effusion 02/21/2022    Acute respiratory failure with hypoxia ([address] 02/21/2022    AMS (altered mental status) 02/20/2022    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 ([address] 02/02/2022    Anxiety 01/08/2022    Gastroesophageal reflux disease 01/08/2022    Recurrent major depressive disorder, in partial remission ([address] 01/08/2022    Acute on chronic anemia 10/28/2021    Pelvic mass     History of deep venous thrombosis (DVT) of distal vein of left lower extremity: On Eliquis 06/20/2021    Thrombocytosis     Gynecologic cancer ([address] 05/29/2021    Iron deficiency anemia secondary to inadequate dietary iron intake 05/28/2021    Iron malabsorption 05/28/2021    Chronic deep vein thrombosis (DVT) of femoral vein of left lower extremity ([address] 03/11/2021    Fatigue     Intractable low back pain     Cancer, metastatic to bone ([address] 01/28/2021    Bandemia     Pleural effusion 01/13/2021    History of ovarian cancer     Status epilepticus ([address] 01/04/2021    Encephalopathy acute     PRES (posterior reversible encephalopathy syndrome)     Hemianopia, bitemporal     Type 2 diabetes mellitus without complication, without long-term current use of insulin (HCC)     Anemia     Splenic abscess     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 [facility])     Acute encephalopathy     Seizure ([facility] 10/21/2020    ACP (advance care planning) 08/21/2020    Malignant neoplasm of ovary ([facility] 08/17/2020        PLAN:     Feeding Diet: Diet NPO    Fluids normal saline @ 100cc/hr     Family Updated      Analgesic acetaminophen and fent gtt     Sedation fentanyl, propofol, and precedex- bradycardic on precedex weaning off     Thrombo-prophylaxis LMW heparin     Mobility good     Heads up 30 Degrees    Ulcer prophylaxis [x] PPI Agent  [] [id number] [] Sucralfate  [] Other:    Glycemic control - not required     Spontaneous breathing trial  will attempt this AM     Bowel regimen/urine output WNL and colace; UOP 1.05 cc/kg/hr       Indwelling catheter/lines Chemo port, PIV, Mercado, ETT     De-escalation SBT today, turn precedex off          PLAN/MEDICAL DECISION MAKING:  Neurologic: AMS & seizure Hx   Neuro exam: sedated but opening eyes and following commands   Neuro checks per protocol  Sedation: propofol, fent and precedex  Get precedex off due to bradycardia   Pain management: Fent gtt, tylenol PRN, need to add oral narcotic   Restart home oral narcotics   Resume anti-epileptics  Neurology consulted - appreciate recomendations    Cardiovascular:  Hemodynamically Stable   MAP goal >65  Bradycardic on precedex - d/c   Hx of DVT- restart home eliquis   Pulmonary: intubated for airway protection   Maintain oxygen sats >92%  Pulmonary toilet  PRVC 16/460/5/30%   ABG 7.4/40/104/25   Weaning well this AM- extubate this afternoon   GI/Nutrition  Bowel routine: colace   Ulcer Prophylaxis: H2 blockers   Diet:Diet NPO  Last BM: not documented   Advance diet if able to extubate   Renal/Fluid/Electrolyte  IV Fluids: 0.9 @ 100 mL/Hr   I/O: In: 3293.4 [I.V.:2532.7; Other:100; NG/GT:100]  Out: 2462 [Urine:2162]  UOP: 1.05 cc/kg/hr overnight   BUN/Cr: 6/0.28   Monitor electrolytes, replace PRN   ID  SIRS criteria in the ED  Immunocompromised state on chemothereapy   CXR concerning for aspiration pneumonia   Tmax: Temp (24hrs), Av.4 °F (37.4 °C), Min:98.2 °F (36.8 °C), Max:100.9 °F (38.3 °C)  WBC 6.9   Antimicrobials: Vancomycin and Zosyn   Blood and urine cx NGTD   Hematology:  H&H 11.5/36.9  Plt 174  Lovenox DVT ppx   Hx od chronic DVT restart home eliquis and d/c lovenox   Hx of extensive metastic ovarian carcinoma   Daily CBC   Endocrine:   BG controlled w/o insulin   Prophylaxis  DVT: lovenox   GI: pepcid       CODE STATUS: Full Code    DISPOSITION:   [x] To remain ICU:   [] OK for out of ICU from [address]  Emergency Medicine Resident, PGY3  Critical Care Service   10/04/22 10:21 AM Yes

## 2024-05-20 NOTE — TELEPHONE ENCOUNTER
Physical Therapy Daily Treatment Note     Name: Holly Julian  [facility] Number: [id number]  Therapy Diagnosis:        Encounter Diagnoses   Name Primary?    S/P right rotator cuff repair      Limited range of motion (ROM) of shoulder          Physician: Yessi Hahn MD     Physician Orders: PT Eval and Treat   Medical Diagnosis from Referral: M75.101 (ICD-10-CM) - Nontraumatic rotator cuff tear, right  Evaluation Date: 10/19/2023  Authorization Period Expiration: 7/9/2024  New Plan of Care Expiration: 8/6/2024  Visit # / Visits authorized: 7/12  Total visits 28  FOTO: 1/1     Precautions: Standard      Time In: 0800  Time Out: 0900  Total Appointment Time (timed & untimed codes): 55 minutes     POSTOPERATIVE PLAN: We will follow the arthroscopic rotator cuff repair guidelines for a small size rotator cuff tear.  We discussed with the patient's family after surgery.  The patient will remain in a sling for 6 weeks.  PT to start at 1-2 weeks.     Cuff specific program:  Pendulum exercises and Codman's exercises in 5-7 days, protecting rotator cuff repair for 1 week by avoiding active motion program until 1 week.     PASSIVE ROM: ER side 30 degrees, Forward Flex 90 degrees, ABD - 60 degrees   Full AAROM/PROM starting at 5-7 days as tolerated         Quality of tissue: fair      Quality of the repair: good       Size of tear: 10 x 15 mm, 60% partial thickness bursal     Subjective     Pt reports this neck and shld have been bothering me all week.  Response to previous treatment: soreness   Functional change: improved functional mobility, but still pain at night     Pain: 4/10  Location: right shoulder      Objective     5/6/2024:  Active flexion to 130 degrees, scaption 1# no hiking or visual signs of discomfort  ER 40 deg arm by side  Good scap mobility in sidelying  ER 4/5, able perform with 1# weight today  Improved endurance to shoulder on UBE level 6  Arms outstretched on 3# wand to mimic reaching needed to drive  Name: Vidhya Harris [facility]  : 1965  MRN: [id number]    Oncology Navigation Follow-Up Note    Contact Type:  Telephone  Notes: Attempted to contact pt for f/u call, no answer, VM left notified avastin order placed & PA resubmitted urgently. Instructed pt writer will follow closely.      Electronically signed by Governalyx Kelley RN on 2020 at 10:34 AM "piper Barrera received therapeutic exercises to develop strength, endurance, ROM, flexibility, posture, and core stabilization for 0 minutes including:      Holly received the following manual therapy techniques: Joint mobilizations, Manual traction, Myofacial release, Manual Lymphatic Drainage, Soft tissue Mobilization, and Friction Massage were applied to the: R shld  for 15  minutes, including:  Scapular mobs, oscillation, R UT stretch,   Posterior capsular stretch, MET shld horizontal abd        Holly participated in neuromuscular re-education activities to improve: Balance, Coordination, Kinesthetic, Sense, Proprioception, and Posture for 25 minutes. The following activities were included:  Pulley 5 shld flexion ROM   Seated flexion wand 3# 30x  Sidelying ER 30 1#  Sidelying shld abd 2x15 #1   Sidelying fleixon 2 x 15 1#    Not today   CC shld flexion 30x eccentric control  CC shld scap 30x eccentric control   Slot machines 2# 3x10  OH OTB pulsing shld flexion 5x/30"       Holly participated in dynamic functional therapeutic activities to improve functional performance for 15  minutes, including:  UBE 5'/5' level 6 for scap endurance and motion   Scaptions 1# 3 x 12    NT  Row GTB 30x   Scap extension OTB 30x  Wall slides 3x10/3"  Standing wall c/cc [place] simulating turning wheel 3x30 ea   Rows GTB with ER 3 x 10    IR/ER OTB conc/ecc 3x10       Home Exercises Provided and Patient Education Provided     Education provided:   - Compliance with HEP     Written Home Exercises Provided: yes.  Exercises were reviewed and Holly was able to demonstrate them prior to the end of the session.  Holly demonstrated good  understanding of the education provided.       Assessment   Pt tolerating tx fair. Continued with pain in neck and shld limiting motion at time. Min relief with manual therapy- TTP anterior shld . VC/TC for correcting form/technique and continued loading shld per supervising PT.       Holly Is " progressing well towards her goals.   Pt prognosis is Good.     Pt will continue to benefit from skilled outpatient physical therapy to address the deficits listed in the problem list box on initial evaluation, provide pt/family education and to maximize pt's level of independence in the home and community environment.     Pt's spiritual, cultural and educational needs considered and pt agreeable to plan of care and goals.    Anticipated barriers to physical therapy: none     Goals: GOALS: Short Term Goals:  6 weeks (Updated 5/6/2024)  1.Report decreased shoulder pain < / =  2/10  to increase tolerance for return to ADLs out the sling(met)  2. Increase PROM 120 flexion and 25 ER to restore a more functional ROM (met)  3. Increased strength by 1/3 MMT grade in R shoulder scap stabilizers to increase tolerance for ADL and work activities.(met)  4. 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 Pt will have improved gcode of CJ (20-40% limited) on FOTO shoulder in order to demonstrate true functional improvement. (Progressing, not met)    Plan     Certification Period: 5/6/2024 to 8/6/2024  Recommended Treatment Plan: 2 times per week for 12 weeks: Manual Therapy, Moist Heat/ Ice, Neuromuscular Re-ed, Patient Education, Self Care, Therapeutic Activities, and Therapeutic Exercise  Other Recommendations: modalities prn, ASTYM prn, Dry Needling prn    Therapist: Judd Freitas, PTA, STS

## 2024-09-08 NOTE — PLAN OF CARE
Lab Results   Component Value Date    HGBA1C 6.6 (H) 02/10/2023       Home meds: mounjaro, tradjenta, metformin  IP meds: SSI and can add lantus if needed.   Q6 glucose while NPO   Problem: Chronic Conditions and Co-morbidities  Goal: Patient's chronic conditions and co-morbidity symptoms are monitored and maintained or improved  Outcome: Progressing     Problem: Pain  Goal: Verbalizes/displays adequate comfort level or baseline comfort level  Outcome: Progressing

## 2024-11-13 NOTE — PROGRESS NOTES
History Of Present Illness  Donna Alcantara is a 57 y.o. female presenting with colonoscopy.     Past Medical History  Past Medical History:   Diagnosis Date    Abnormal findings on diagnostic imaging of other specified body structures 04/16/2020    Abnormal finding on imaging    Acute eczematoid otitis externa, unspecified ear 12/03/2018    Dermatitis of ear canal    Allergic     Anemia     Arthritis     Contact with and (suspected) exposure to covid-19 12/08/2020    Suspected COVID-19 virus infection    Encounter for immunization 03/29/2018    Immunization due    Encounter for immunization 12/26/2019    Immunization due    Encounter for other screening for malignant neoplasm of breast 05/29/2018    Screening for breast cancer    Encounter for screening for malignant neoplasm of cervix 05/29/2018    Screening for cervical cancer    Encounter for screening for other viral diseases     Need for hepatitis C screening test    Fibrocystic breast     Low back pain, unspecified 07/23/2021    Acute left-sided low back pain without sciatica    Other abnormal and inconclusive findings on diagnostic imaging of breast 12/05/2018    Abnormal MRI, breast    Other specified disorders of eustachian tube, right ear 12/03/2018    Dysfunction of right eustachian tube    Other specified disorders of nose and nasal sinuses 03/22/2017    Irritation, nose    Other specified noninflammatory disorders of vagina 06/20/2019    Vaginal dryness    Pain in right shoulder 11/12/2020    Right shoulder pain    Peptic ulceration     Personal history of other benign neoplasm 03/21/2017    History of lipoma    Personal history of other diseases of the musculoskeletal system and connective tissue 03/21/2017    History of arthritis    Personal history of other diseases of the musculoskeletal system and connective tissue 10/16/2019    History of low back pain    Personal history of other diseases of urinary system 01/17/2020    History of hematuria     Today's Date: 6/4/2022  Patient Name: Josi Clements  Date of admission: 6/3/2022  8:21 AM  Patient's age: 59 y.o., 1965  Admission Dx: Anemia [D64.9]  Anemia, unspecified type [D64.9]    Reason for Consult: management recommendations  Requesting Physician: Piter Johnson MD    CHIEF COMPLAINT: Fatigue/patient on chemotherapy    History Obtained From:  Patient      Interval history:    Patient seen and examined  Labs and vitals reviewed  Patient seen by GI team  Conservative management recommended. Iron studies suggest iron deficiency.  for now slightly improved  Patient on anticoagulation      HISTORY OF PRESENT ILLNESS:      The patient is a 59 y.o.  female who is admitted to the hospital for severe fatigue she was supposed to get her chemotherapy for stage IV ovarian cancer today and reported difficulty ambulating no fever or chills abdominal pain nausea vomiting she has constipation and she was admitted for observation, hemoglobin at 6.3 with a platelet at 877 and WBC 13.9    Oncology history as below       DIAGNOSIS:       Recurrent ovarian cancer. Original diagnosis 2005 with multiple recurrences. Diffuse metastasis. CURRENT THERAPY:         Doxil/ Avastin started 9/18/2020. Avastin was discontinued due to recent GI bleeding. Status post recent vascular embolization  S/p seizure disorder. Gemzar started 2/26/2021. Interrupted due to hospitalization and problem with compliance. Evidence of disease progression on CT scan 2/22/22  Add Carboplatin to Gemzar March 2022        Past Medical History:   has a past medical history of Anemia, Bleeding, Cervical cancer ([address] Depression, Diabetes mellitus ([address] GERD (gastroesophageal reflux disease), Hx of blood clots, Hypertension, Metastatic cancer ([address] Ovarian cancer ([address] Post chemo evaluation, and Splenic lesion.     Past Surgical History:   has a past surgical history that includes Hysterectomy, total abdominal; Port Personal history of other medical treatment 07/10/2019    History of screening mammography    Personal history of other specified conditions 02/15/2021    History of abdominal pain    Personal history of other specified conditions 07/23/2021    History of urinary frequency    Personal history of other specified conditions 03/29/2018    History of dysuria    Personal history of other specified conditions 01/23/2020    History of epigastric pain    Personal history of pneumonia (recurrent) 12/26/2019    History of community acquired pneumonia    Personal history of urinary (tract) infections 11/02/2017    History of urinary tract infection    Postmenopausal atrophic vaginitis 03/31/2022    Vaginal atrophy    Psoriasis, unspecified 03/21/2017    Scalp psoriasis    Sprain of left acromioclavicular joint, initial encounter 01/17/2020    Sprain of left acromioclavicular ligament, initial encounter    Unspecified injury of muscle(s) and tendon(s) of the rotator cuff of right shoulder, initial encounter 09/29/2020    Injury of right rotator cuff, initial encounter    Unspecified sprain of unspecified finger, initial encounter 01/17/2020    Finger sprain    Varicella        Surgical History  Past Surgical History:   Procedure Laterality Date    APPENDECTOMY  03/21/2017    Appendectomy    BREAST BIOPSY  9/2023    BREAST SURGERY  12/2022    REDUCTION MAMMAPLASTY  12/1/12    SKIN BIOPSY Right     Skin cancer right thigh    TONSILLECTOMY  03/21/2017    Tonsillectomy    WISDOM TOOTH EXTRACTION          Social History  She reports that she quit smoking about 19 years ago. Her smoking use included cigarettes. She started smoking about 34 years ago. She has a 15 pack-year smoking history. She has never used smokeless tobacco. She reports current alcohol use of about 8.0 standard drinks of alcohol per week. She reports that she does not use drugs.    Family History  Family History   Problem Relation Name Age of Onset    Hypertension  Surgery; Tonsillectomy; IR PORT PLACEMENT > 5 YEARS (2020); Anus surgery; Abscess Drainage (); colectomy (2013); IR EMBOLIZATION HEMORRHAGE (10/05/2020); and Cardiac catheterization. Medications:    Reviewed in Epic     Allergies:  Ceftriaxone    Social History:   reports that she has been smoking cigarettes. She has a 20.00 pack-year smoking history. She has never used smokeless tobacco. She reports previous alcohol use. She reports that she does not use drugs. Family History: family history includes Alcohol Abuse in her mother; Cirrhosis in her mother. REVIEW OF SYSTEMS:    Constitutional: No fever or chills. No night sweats,+weight loss ,+ fatigue  Eyes: No eye discharge, double vision, or eye pain   HEENT: negative for sore mouth, sore throat, hoarseness and voice change   Respiratory: negative for cough , sputum, dyspnea, wheezing, hemoptysis, chest pain   Cardiovascular: negative for chest pain, dyspnea, palpitations, orthopnea, PND   Gastrointestinal: negative for nausea, vomiting, diarrhea, constipation, abdominal pain, Dysphagia, hematemesis and hematochezia   Genitourinary: negative for frequency, dysuria, nocturia, urinary incontinence, and hematuria   Integument: negative for rash, skin lesions, bruises.    Hematologic/Lymphatic: negative for easy bruising, bleeding, lymphadenopathy, or petechiae   Endocrine: negative for heat or cold intolerance,weight changes, change in bowel habits and hair loss   Musculoskeletal: negative for myalgias, arthralgias, pain, joint swelling,and bone pain   Neurological: negative for headaches, dizziness, seizures, weakness, numbness    PHYSICAL EXAM:      BP (!) 107/53   Pulse 61   Temp 97.3 °F (36.3 °C)   Resp 16   Wt 160 lb (72.6 kg)   SpO2 98%   BMI 26.63 kg/m²    Temp (24hrs), Av.8 °F (36.6 °C), Min:97.3 °F (36.3 °C), Max:98.2 °F (36.8 °C)    General appearance -ill looking2  Mental status - alert and cooperative   Eyes - pupils equal Mother Ramandeep     Breast cancer Mother Ramandeep     Uterine cancer Mother Ramandeep     Osteoporosis Mother Ramandeep     Arthritis Mother Ramandeep     Breast cancer Sister Pauline hernandez     Alcohol abuse Mother's Brother Michael     Colon cancer Mother's Brother Michael     Diabetes Maternal Grandmother Grandma     Breast cancer Maternal Cousin          Allergies  Erythromycin base    Review of Systems   Constitutional:  Negative for chills, fever and unexpected weight change.   HENT:  Negative for congestion and trouble swallowing.    Respiratory:  Negative for cough, shortness of breath and wheezing.    Cardiovascular:  Negative for chest pain.   Gastrointestinal:  Negative for abdominal distention, abdominal pain, constipation, diarrhea, nausea and vomiting.   Genitourinary:  Negative for difficulty urinating.   Musculoskeletal:  Negative for arthralgias and joint swelling.   Skin:  Negative for color change.   Neurological:  Negative for dizziness, speech difficulty, light-headedness and headaches.   Psychiatric/Behavioral:  Negative for confusion and sleep disturbance.         Physical Exam  Constitutional:       General: She is awake.      Appearance: Normal appearance.   HENT:      Head: Normocephalic and atraumatic.      Nose: Nose normal.      Mouth/Throat:      Mouth: Mucous membranes are moist.   Eyes:      Pupils: Pupils are equal, round, and reactive to light.   Neck:      Thyroid: No thyroid mass.      Trachea: Phonation normal.   Cardiovascular:      Rate and Rhythm: Normal rate and regular rhythm.      Heart sounds: Normal heart sounds. No murmur heard.     No gallop.   Pulmonary:      Effort: Pulmonary effort is normal. No respiratory distress.      Breath sounds: Normal air entry. No decreased breath sounds, wheezing, rhonchi or rales.   Abdominal:      General: Bowel sounds are normal. There is no distension.      Palpations: Abdomen is soft.      Tenderness: There is no abdominal tenderness.   Musculoskeletal:       and reactive, extraocular eye movements intact   Ears - bilateral TM's and external ear canals normal   Mouth - mucous membranes moist, pharynx normal without lesions   Neck - supple, no significant adenopathy   Lymphatics - no palpable lymphadenopathy, no hepatosplenomegaly   Chest - clear to auscultation, no wheezes, rales or rhonchi, symmetric air entry   Heart - normal rate, regular rhythm, normal S1, S2, no murmurs  Abdomen - soft, nontender, nondistended, no masses or organomegaly   Neurological - alert, oriented, normal speech, no focal findings or movement disorder noted   Musculoskeletal - no joint tenderness, deformity or swelling   Extremities - peripheral pulses normal, no pedal edema, no clubbing or cyanosis   Skin - normal coloration and turgor, no rashes, no suspicious skin lesions noted ,    DATA:    Labs:   CBC:   Recent Labs     06/03/22  0921 06/03/22  0921 06/03/22  1249 06/04/22  0419   WBC 13.9*  --   --  13.0*   HGB 6.3*   < > 8.1* 7.3*   HCT 23.9*   < > 28.7* 24.6*   *  --   --  577*    < > = values in this interval not displayed. BMP:   Recent Labs     06/03/22  0921   *   K 3.6*   CO2 24   BUN 15   CREATININE 0.52   LABGLOM >60   GLUCOSE 131*     PT/INR: No results for input(s): PROTIME, INR in the last 72 hours. IMAGING DATA:  CT ABDOMEN PELVIS W IV CONTRAST Additional Contrast? None   Final Result   Worsened diffuse colonic distension with stool and air-filled colon and wall   thickening. Suggestion of a segment of narrowing of the rectosigmoid colon   redemonstrated, similar or slightly improved from prior study. Chronic   colonic obstruction/stricture of versus Lisha syndrome are considerations. Correlate with direct visualization if indicated. Unchanged masslike partially calcified lesion in the pelvis may be compatible   with known history of malignancy or post treatment changes in this location.       mild improved left inguinal lymphadenopathy, "Cervical back: Neck supple.      Right lower leg: No edema.      Left lower leg: No edema.   Skin:     General: Skin is warm.      Capillary Refill: Capillary refill takes less than 2 seconds.   Neurological:      General: No focal deficit present.      Mental Status: She is alert and oriented to person, place, and time. Mental status is at baseline.      Cranial Nerves: Cranial nerves 2-12 are intact.      Motor: Motor function is intact.   Psychiatric:         Attention and Perception: Attention and perception normal.         Mood and Affect: Mood normal.         Speech: Speech normal.         Behavior: Behavior normal.          Last Recorded Vitals  Blood pressure 116/75, pulse 70, temperature 36.3 °C (97.3 °F), temperature source Temporal, resp. rate 16, height 1.6 m (5' 3\"), weight 59.7 kg (131 lb 9.8 oz), SpO2 99%, not currently breastfeeding.    Relevant Results             Assessment/Plan   Assessment & Plan  Screening for colorectal cancer    Proceed with colonoscopy         I spent  minutes in the professional and overall care of this patient.      Pop Couch MD    " otherwise stable retroperitoneal   lymphadenopathy with some partially calcified lymph nodes. Worsened subcutaneous soft tissue edema most significant at the left lower   proximal thigh could be related to lymphedema. Stable osseous metastatic disease. XR CHEST PORTABLE   Final Result   Stable support line      Mild prominence of interstitial markings suggests mild vascular congestion             Primary Problem  Anemia    Active Hospital Problems    Diagnosis Date Noted    Anemia [D64.9]          IMPRESSION:   1. Severe anemia related to chemotherapy/myelosuppression at the time of anemia of chronic disease/cancer  2. Recurrent metastatic ovarian cancer  3. Severe weakness fatigue due to above  4. Thrombocytosis likely reactive  5. Leukocytosis  6. Constipation/concern about bowel obstruction with abnormal CT of the abdomen      RECOMMENDATIONS:  1. I reviewed the laboratory data, imaging studies, discussed the diagnosis and possible treatment   2. Blood transfusion to keep hemoglobin above 7  3. GI team evaluated patient. Recommending conservative management  4.  slightly improved  Will give IV iron given iron deficiency  No plans for chemotherapy in house    Discussed with patient and Nurse. Thank you for asking us to see this patient. Laura Fuentes MD                 This note is created with the assistance of a speech recognition program.  While intending to generate a document that actually reflects the content of the visit, the document can still have some errors including those of syntax and sound a like substitutions which may escape proof reading. It such instances, actual meaning can be extrapolated by contextual diversion.

## 2024-11-15 ENCOUNTER — TELEPHONE (OUTPATIENT)
Dept: PRIMARY CARE CLINIC | Age: 59
End: 2024-11-15

## 2024-11-15 NOTE — TELEPHONE ENCOUNTER
Patient's daughter Jayshree called requesting copy of POA-she cannot find her copy.  Advised her that writer would check with  to see if this is able to be done since patient is .    Writer will call daughter back.

## 2024-11-18 NOTE — PROGRESS NOTES
[facility] [address]  PROGRESS NOTE    Shift date: 10.4.2022  Shift day: Tuesday   Shift # 2    Room # 4421/9598-23   Name: Lisa Gunderson                Worship: unknown   Place of [facility]: unknown    Referral: Routine Visit    Admit Date & Time: 10/3/2022  1:07 PM    Assessment:  Lisa Gunderson is a 59 y.o. female in the hospital. Upon entering the room writer observes patient appearing restless with family bedside and appearing anxious. Patient states that she is supposed to get anxiety mediation. Intervention:  Writer introduced self and title as  Writer offered space for the family and patient  to express feelings, needs, and concerns and provided a ministry presence. Determined support available. Outcome:  Patient and family appear to be somewhat distressed. Nursing is bedside to assist.      Plan:  Chaplains will remain available to offer spiritual and emotional support as needed. Electronically signed by Nancy Lemons on 10/4/2022 at [facility]  [phone]       10/04/22 1745   Encounter Summary   Service Provided For: Patient and family together   Referral/Consult From: [facility] Family members   Last Encounter  10/04/22   Complexity of Encounter Moderate   Begin Time 1745   End Time  1800   Total Time Calculated 15 min   Encounter    Type Follow up   Assessment/Intervention/Outcome   Assessment Anxious   Intervention Active listening;Discussed illness injury and its impact; Explored/Affirmed feelings, thoughts, concerns   Outcome Expressed feelings, needs, and concerns     Electronically signed by Flory Decker on 10/4/2022 at 6:13 PM Detail Level: Detailed Quality 226: Preventive Care And Screening: Tobacco Use: Screening And Cessation Intervention: Patient screened for tobacco use and is an ex/non-smoker Quality 130: Documentation Of Current Medications In The Medical Record: Current Medications Documented

## 2025-03-24 NOTE — TELEPHONE ENCOUNTER
Chemo orders received, ht 65\", wt 175lbs, and bsa 1.91 verified.    Dose of chemotherapy verified;  Gemzar 1,000 mg/m2= 1910mg 90

## 2025-04-23 NOTE — ED PROVIDER NOTES
[facility]  ED  Emergency Department Encounter  EmergencyMedicine Resident     Pt Name:Breanna Hernandez  MRN: [id number]  [place] 1965  Date of evaluation: 6/18/21  PCP:  Elsa Jain MD    CHIEF COMPLAINT       Chief Complaint   Patient presents with   Heydi Howard Cerebrovascular Accident     brought in per EMS        HISTORY OF PRESENT ILLNESS  (Location/Symptom, Timing/Onset, Context/Setting, Quality, Duration, Modifying Factors, Severity.)      David Lopez is a 60 y.o. female who presents with RACE alert. Patient presents as a RACE alert via EMS, last known well 7:35 AM, patient was talking to son, son reports that patient became unresponsive, right-sided weakness, right-sided gaze deviation. Son denies seizure-like activity, but patient does have history of seizures, is on Keppra, has been taking as prescribed. Patient has history of metastatic ovarian cancer, now was recently admitted to the neurology service for PRES syndrome. In route, EMS reports the patient had an episode of V. tach, was having seizure-like activity, administered 2 mg Versed with resolution of seizure-like activity. PAST MEDICAL / SURGICAL / SOCIAL / FAMILY HISTORY      has a past medical history of Anemia, Bleeding, Cervical cancer ([address] Depression, Diabetes mellitus ([address] GERD (gastroesophageal reflux disease), Hx of blood clots, Hypertension, Metastatic cancer ([address] Ovarian cancer ([address] Post chemo evaluation, and Splenic lesion. has a past surgical history that includes Hysterectomy, total abdominal; Port Surgery; Tonsillectomy; IR PORT PLACEMENT > 5 YEARS (08/24/2020); Anus surgery; Abscess Drainage (2013); colectomy (03/2013); IR EMBOLIZATION HEMORRHAGE (10/05/2020); and Cardiac catheterization.       Social History     Socioeconomic History    Marital status: Single     Spouse name: Not on file    Number of children: Not on file    Years of education: Not on file    Highest education level: Not on file   Occupational History    Not on file   Tobacco Use    Smoking status: Current Every Day Smoker     Packs/day: 1.00     Types: Cigarettes    Smokeless tobacco: Current User   Vaping Use    Vaping Use: Never used   Substance and Sexual Activity    Alcohol use: Not Currently    Drug use: Never    Sexual activity: Not on file   Other Topics Concern    Not on file   Social History Narrative    Not on file     Social Determinants of Health     Financial Resource Strain:     Difficulty of Paying Living Expenses:    Food Insecurity:     Worried About Running Out of Food in the Last Year:     Ran Out of Food in the Last Year:    Transportation Needs:     Lack of Transportation (Medical):  Lack of Transportation (Non-Medical):    Physical Activity:     Days of Exercise per Week:     Minutes of Exercise per Session:    Stress:     Feeling of Stress :    Social Connections:     Frequency of Communication with Friends and Family:     Frequency of Social Gatherings with Friends and Family:     Attends Orthodox Services:     Active Member of Clubs or Organizations:     Attends Club or Organization Meetings:     Marital Status:    Intimate Partner Violence:     Fear of Current or Ex-Partner:     Emotionally Abused:     Physically Abused:     Sexually Abused:        Family History   Problem Relation Age of Onset    Alcohol Abuse Mother     Cirrhosis Mother        Allergies:  Ceftriaxone    Home Medications:  Prior to Admission medications    Medication Sig Start Date End Date Taking? Authorizing Provider   morphine (MS CONTIN) 15 MG extended release tablet Take 1 tablet by mouth 2 times daily for 30 days.  6/7/21 7/7/21  Candice Ornelas MD   sertraline (ZOLOFT) 100 MG tablet Take 1 tablet by mouth daily 6/7/21 7/7/21  Pascale Bhatia MD   metoprolol succinate (TOPROL XL) 25 MG extended release tablet Take 1 tablet by mouth daily 6/7/21   Candice Ornelas MD   SZAWCLG 2 MG TABS tablet Take 1 tablet by mouth daily 5/26/21   Historical Provider, MD   oxyCODONE-acetaminophen (PERCOCET) 5-325 MG per tablet TAKE 1 TABLET BY MOUTH EVERY 4 HOURS AS NEEDED FOR PAIN (BREAKTHROUGH PAIN) FOR UP TO 30 DAYS. 5/19/21 6/18/21  Negro Marshall MD   pantoprazole (PROTONIX) 40 MG tablet Take 1 tablet by mouth 2 times daily 5/10/21   Negro Marshall MD   levETIRAcetam (KEPPRA) 750 MG tablet Take 2 tablets by mouth 2 times daily 5/3/21   Negro Marshall MD   ferrous sulfate (FE TABS 325) 325 (65 Fe) MG EC tablet Take 1 tablet by mouth daily (with breakfast) 3/12/21   ESTRELLITA Galvan NP   metFORMIN (GLUCOPHAGE-XR) 500 MG extended release tablet Take 2 tablets by mouth twice daily 2/26/21   Negro Marshall MD   aspirin 81 MG chewable tablet Take 81 mg by mouth nightly Pt not taking    Historical Provider, MD       REVIEW OF SYSTEMS    (2-9 systems for level 4, 10 or more for level 5)      Review of Systems   Unable to obtain secondary to condition. PHYSICAL EXAM   (up to 7 for level 4, 8 or more for level 5)      INITIAL VITALS:   /79   Pulse 105   Resp 22   Ht 5' 2\" (1.575 m)   Wt 165 lb 5.5 oz (75 kg)   SpO2 100%   BMI 30.24 kg/m²     Physical Exam  Constitutional:       General: She is not in acute distress. Appearance: She is well-developed. She is not diaphoretic. Comments: Patient is now alert, readily moving her left side, no  movement to pain on the right side, right eye gaze deviation   HENT:      Head: Normocephalic and atraumatic. Right Ear: External ear normal.      Left Ear: External ear normal.      Nose: Nose normal. No congestion or rhinorrhea. Mouth/Throat:      Mouth: Mucous membranes are moist.      Pharynx: Oropharynx is clear. No oropharyngeal exudate or posterior oropharyngeal erythema. Eyes:      General:         Right eye: No discharge. Left eye: No discharge.       Pupils: Pupils are equal, round, and reactive to light.      Comments: Right eye gaze deviation, no movement to threat on the left side   Neck:      Vascular: No JVD. Trachea: No tracheal deviation. Cardiovascular:      Rate and Rhythm: Normal rate and regular rhythm. Pulses: Normal pulses. Heart sounds: Normal heart sounds. No murmur heard. No friction rub. No gallop. Pulmonary:      Effort: Pulmonary effort is normal. No respiratory distress. Breath sounds: Normal breath sounds. No stridor. No wheezing, rhonchi or rales. Chest:      Chest wall: No tenderness. Abdominal:      General: There is no distension. Palpations: Abdomen is soft. There is no mass. Tenderness: There is no abdominal tenderness. There is no right CVA tenderness, left CVA tenderness or guarding. Musculoskeletal:         General: No tenderness. Normal range of motion. Cervical back: Normal range of motion and neck supple. Right lower leg: No edema. Left lower leg: No edema. Skin:     General: Skin is warm. Capillary Refill: Capillary refill takes less than 2 seconds.    Neurological:      Comments: Patient initially has right eye gaze deviation, does not follow commands, no movement to right arm or right leg, actively moving left arm and left leg         DIFFERENTIAL  DIAGNOSIS     PLAN (LABS / IMAGING / EKG):  Orders Placed This Encounter   Procedures    COVID-19, Rapid    CT Head WO Contrast    CTA HEAD NECK W CONTRAST    XR CHEST PORTABLE    MRI LIMITED BRAIN    XR CHEST PORTABLE    CBC Auto Differential    Comprehensive Metabolic Panel w/ Reflex to MG    APTT    Protime-INR    Troponin    Urinalysis Reflex to Culture    Lactic Acid, Plasma    ELECTROLYTES PLUS    Hemoglobin and hematocrit, blood    CALCIUM, IONIC (POC)    Levetiracetam Level    Immature Platelet Fraction    Blood gas, arterial    Troponin    CBC auto differential    Comprehensive Metabolic Panel    Magnesium    Phosphorus    Microscopic Urinalysis    Procalcitonin    Diet NPO    Elevate Head of Bed    Spontaneous Awakening Trial (SAT)    Vital signs per unit routine    Daily weights    Intake and output    Place intermittent pneumatic compression device    Telemetry monitoring - continuous duration    Strict Bedrest    Insert indwelling urinary catheter    Discontinue indwelling urinary catheter when documented indications no longer apply per hospital policy    Place sequential compression device    Full Code    Inpatient consult to Neurocritical care    Inpatient consult to Dietitian    Consult to Hematology/Oncology    Capnography    Pulse oximetry, continuous    Post Extubation Oxygen Therapy    Spontaneous Breathing Trial (SBT)    Manual Mechanical Ventilation    Initiate Oxygen Therapy Protocol    Respiratory care evaluation only    Venous Blood Gas, POC    Creatinine W/GFR Point of Care    POCT urea (BUN)    Lactic Acid, POC    POCT Glucose    Arterial Blood Gas, POC    EKG 12 Lead    EEG video monitoring    PATIENT STATUS (FROM ED OR OR/PROCEDURAL) Inpatient       MEDICATIONS ORDERED:  Orders Placed This Encounter   Medications    iopamidol (ISOVUE-370) 76 % injection 90 mL    levETIRAcetam (KEPPRA) 1000 mg/100 mL IVPB    0.9 % sodium chloride bolus    LORazepam (ATIVAN) 2 MG/ML injection     Nikunj Cruz: cabinet override    propofol 1000 MG/100ML injection     OSCAR TONEY: cabinet override    LORazepam (ATIVAN) 2 MG/ML injection     Nikunj Cruz: cabinet override    LORazepam (ATIVAN) 2 MG/ML injection     OSCAR TONEY: cabinet override    fentaNYL (SUBLIMAZE) injection 100 mcg    DISCONTD: fentaNYL (SUBLIMAZE) injection 50 mcg    chlorhexidine (PERIDEX) 0.12 % solution 15 mL    famotidine (PEPCID) injection 20 mg    LORazepam (ATIVAN) 2 MG/ML injection     TERRI GIFFORD: cabinet override    propofol injection    sodium chloride flush 0.9 % injection 5-40 mL    sodium chloride flush 0.9 % injection 5-40 mL    0.9 % sodium chloride infusion    enoxaparin (LOVENOX) injection 40 mg    OR Linked Order Group     ondansetron (ZOFRAN-ODT) disintegrating tablet 4 mg     ondansetron (ZOFRAN) injection 4 mg    polyethylene glycol (GLYCOLAX) packet 17 g    OR Linked Order Group     acetaminophen (TYLENOL) tablet 650 mg     acetaminophen (TYLENOL) suppository 650 mg    lactated ringers infusion    propofol 1000 MG/100ML injection     OSCAR TONEY: cabinet override    fentaNYL (SUBLIMAZE) injection 100 mcg    levETIRAcetam (KEPPRA) 1000 mg/100 mL IVPB    levETIRAcetam (KEPPRA) 500 mg/100 mL IVPB       DDX:     DIAGNOSTIC RESULTS / EMERGENCY DEPARTMENT COURSE / MDM   LAB RESULTS:  Results for orders placed or performed during the hospital encounter of 06/18/21   COVID-19, Rapid    Specimen: Nasopharyngeal Swab   Result Value Ref Range    Specimen Description . NASOPHARYNGEAL SWAB     SARS-CoV-2, Rapid Not Detected Not Detected   CBC Auto Differential   Result Value Ref Range    WBC 15.6 (H) 3.5 - 11.3 k/uL    RBC 4.13 3.95 - 5.11 m/uL    Hemoglobin 8.9 (L) 11.9 - 15.1 g/dL    Hematocrit 34.3 (L) 36.3 - 47.1 %    MCV 83.1 82.6 - 102.9 fL    MCH 21.5 (L) 25.2 - 33.5 pg    MCHC 25.9 (L) 28.4 - 34.8 g/dL    RDW 28.4 (H) 11.8 - 14.4 %    Platelets See Reflexed IPF Result 138 - 453 k/uL    MPV NOT REPORTED 8.1 - 13.5 fL    NRBC Automated 0.0 0.0 per 100 WBC    Differential Type NOT REPORTED     WBC Morphology NOT REPORTED     RBC Morphology NOT REPORTED     Platelet Estimate NOT REPORTED     Immature Granulocytes 0 0 %    Seg Neutrophils 55 36 - 66 %    Lymphocytes 27 24 - 44 %    Monocytes 11 (H) 1 - 7 %    Eosinophils % 6 (H) 1 - 4 %    Basophils 1 0 - 2 %    nRBC 1 (H) 0 per 100 WBC    Absolute Immature Granulocyte 0.00 0.00 - 0.30 k/uL    Segs Absolute 8.57 (H) 1.8 - 7.7 k/uL    Absolute Lymph # 4.21 1.0 - 4.8 k/uL    Absolute Mono # 1.72 (H) 0.1 - 0.8 k/uL    Absolute Eos # 0.94 (H) 0.0 - 0.4 k/uL    Basophils Absolute 0.16 0.0 - 0.2 k/uL    Morphology ANISOCYTOSIS PRESENT     Morphology HYPOCHROMIA PRESENT     Morphology 1+ POLYCHROMASIA     Morphology GIANT PLATELETS PRESENT    Comprehensive Metabolic Panel w/ Reflex to MG   Result Value Ref Range    Glucose 161 (H) 70 - 99 mg/dL    BUN 8 6 - 20 mg/dL    CREATININE 0.43 (L) 0.50 - 0.90 mg/dL    Bun/Cre Ratio NOT REPORTED 9 - 20    Calcium 8.7 8.6 - 10.4 mg/dL    Sodium 140 135 - 144 mmol/L    Potassium 3.9 3.7 - 5.3 mmol/L    Chloride 103 98 - 107 mmol/L    CO2 18 (L) 20 - 31 mmol/L    Anion Gap 19 (H) 9 - 17 mmol/L    Alkaline Phosphatase 64 35 - 104 U/L    ALT 5 5 - 33 U/L    AST 12 <32 U/L    Total Bilirubin <0.10 (L) 0.3 - 1.2 mg/dL    Total Protein 6.7 6.4 - 8.3 g/dL    Albumin 4.0 3.5 - 5.2 g/dL    Albumin/Globulin Ratio 1.5 1.0 - 2.5    GFR Non-African American >60 >60 mL/min    GFR African American >60 >60 mL/min    GFR Comment          GFR Staging NOT REPORTED    APTT   Result Value Ref Range    PTT 21.0 20.5 - 30.5 sec   Protime-INR   Result Value Ref Range    Protime 10.1 9.1 - 12.3 sec    INR 0.9    Troponin   Result Value Ref Range    Troponin, High Sensitivity 7 0 - 14 ng/L    Troponin T NOT REPORTED <0.03 ng/mL    Troponin Interp NOT REPORTED    Urinalysis Reflex to Culture    Specimen: Urine, clean catch   Result Value Ref Range    Color, UA YELLOW YELLOW    Turbidity UA CLEAR CLEAR    Glucose, Ur NEGATIVE NEGATIVE    Bilirubin Urine NEGATIVE NEGATIVE    Ketones, Urine TRACE (A) NEGATIVE    Specific Gravity, UA 1.035 (H) 1.005 - 1.030    Urine Hgb NEGATIVE NEGATIVE    pH, UA 5.0 5.0 - 8.0    Protein, UA TRACE (A) NEGATIVE    Urobilinogen, Urine Normal Normal    Nitrite, Urine NEGATIVE NEGATIVE    Leukocyte Esterase, Urine NEGATIVE NEGATIVE    Urinalysis Comments NOT REPORTED    Lactic Acid, Plasma   Result Value Ref Range    Lactic Acid NOT REPORTED mmol/L    Lactic Acid, Whole Blood 12.1 (H) 0.7 - 2.1 mmol/L ELECTROLYTES PLUS   Result Value Ref Range    POC Sodium 143 138 - 146 mmol/L    POC Potassium 3.6 3.5 - 4.5 mmol/L    POC Chloride 108 (H) 98 - 107 mmol/L    POC TCO2 18 (L) 22 - 30 mmol/L    Anion Gap 18 (H) 7 - 16 mmol/L   Hemoglobin and hematocrit, blood   Result Value Ref Range    POC Hemoglobin 12.1 12.0 - 16.0 g/dL    POC Hematocrit 36 36 - 46 %   CALCIUM, IONIC (POC)   Result Value Ref Range    POC Ionized Calcium 1.14 (L) 1.15 - 1.33 mmol/L   Levetiracetam Level   Result Value Ref Range    Levetiracetam Lvl 23 ug/mL   Immature Platelet Fraction   Result Value Ref Range    Platelet, Immature Fraction 2.3 1.1 - 10.3 %    Platelet, Fluorescence 1,382 (HH) 138 - 453 k/uL   Troponin   Result Value Ref Range    Troponin, High Sensitivity 156 (HH) 0 - 14 ng/L    Troponin T NOT REPORTED <0.03 ng/mL    Troponin Interp NOT REPORTED    Microscopic Urinalysis   Result Value Ref Range    -          WBC, UA None 0 - 5 /HPF    RBC, UA 0 TO 2 0 - 4 /HPF    Casts UA NOT REPORTED 0 - 8 /LPF    Crystals, UA NOT REPORTED None /HPF    Epithelial Cells UA 0 TO 2 0 - 5 /HPF    Renal Epithelial, UA NOT REPORTED 0 /HPF    Bacteria, UA NOT REPORTED None    Mucus, UA NOT REPORTED None    Trichomonas, UA NOT REPORTED None    Amorphous, UA NOT REPORTED None    Other Observations UA NOT REPORTED NOT REQ.     Yeast, UA NOT REPORTED None   Venous Blood Gas, POC   Result Value Ref Range    pH, Steffen 7.195 (LL) 7.320 - 7.430    pCO2, Steffen 42.9 41.0 - 51.0 mm Hg    pO2, Steffen 77.5 (H) 30 - 50 mm Hg    HCO3, Venous 16.6 (L) 22.0 - 29.0 mmol/L    Total CO2, Venous NOT REPORTED 23.0 - 30.0 mmol/L    Negative Base Excess, Steffen 11 (H) 0.0 - 2.0    Positive Base Excess, Steffen NOT REPORTED 0.0 - 3.0    O2 Sat, Steffen 92 (H) 60.0 - 85.0 %    O2 Device/Flow/% NOT REPORTED     Joe Test NOT REPORTED     Sample Site NOT REPORTED     Mode NOT REPORTED     FIO2 NOT REPORTED     Pt Temp NOT REPORTED     POC pH Temp NOT REPORTED     POC pCO2 Temp NOT REPORTED mm Hg POC pO2 Temp NOT REPORTED mm Hg   Creatinine W/GFR Point of Care   Result Value Ref Range    POC Creatinine 0.58 0.51 - 1.19 mg/dL    GFR Comment >60 >60 mL/min    GFR Non-African American >60 >60 mL/min    GFR Comment         POCT urea (BUN)   Result Value Ref Range    POC BUN 8 8 - 26 mg/dL   Lactic Acid, POC   Result Value Ref Range    POC Lactic Acid 9.98 (H) 0.56 - 1.39 mmol/L   POCT Glucose   Result Value Ref Range    POC Glucose 159 (H) 74 - 100 mg/dL   Arterial Blood Gas, POC   Result Value Ref Range    POC pH 7.319 (L) 7.350 - 7.450    POC pCO2 42.5 35.0 - 48.0 mm Hg    POC PO2 173.9 (H) 83.0 - 108.0 mm Hg    POC HCO3 21.8 21.0 - 28.0 mmol/L    TCO2 (calc), Art NOT REPORTED 22.0 - 29.0 mmol/L    Negative Base Excess, Art 4 (H) 0.0 - 2.0    Positive Base Excess, Art NOT REPORTED 0.0 - 3.0    POC O2 SAT 99 (H) 94.0 - 98.0 %    O2 Device/Flow/% Adult Ventilator     Joe Test POSITIVE     Sample Site Left Radial Artery     Mode PRVC     FIO2 50.0     Pt Temp NOT REPORTED     POC pH Temp NOT REPORTED     POC pCO2 Temp NOT REPORTED mm Hg    POC pO2 Temp NOT REPORTED mm Hg       IMPRESSION: 56-year-old female with history of ovarian metastatic cancer, PRES syndrome, seizures on Keppra, presenting as a RACE alert via EMS. Patient has fluctuating exam, on reexamination, patient is unable to move left arm and right leg, but is able to move right arm and left leg, concern for intracranial mass versus seizure versus stroke. Patient is a stroke alert, will obtain stroke imaging to include stat MRI. Will obtain labs to evaluate for other causes of altered mental status. Patient continued to seize, administered 2 mg Ativan 3 times, intubated patient, started patient on propofol.     RADIOLOGY:  CT Head WO Contrast    Result Date: 6/18/2021  EXAMINATION: CT OF THE HEAD WITHOUT CONTRAST  6/18/2021 9:19 am TECHNIQUE: CT of the head was performed without the administration of intravenous contrast. Dose modulation, pulmonary or right pleural abnormality. Scattered bony sclerotic foci, compatible with known Mets. No destructive lesion or obvious interval change. Support tubes satisfactory. Similar enlarged susan (known adenopathy), scattered parenchymal densities (known pulmonary metastases) without significant interval change; probable smaller left pleural effusion. CTA HEAD NECK W CONTRAST    Result Date: 6/18/2021  EXAMINATION: CTA OF THE HEAD AND NECK WITH CONTRAST 6/18/2021 9:19 am: TECHNIQUE: CTA of the head and neck was performed with the administration of intravenous contrast. Multiplanar reformatted images are provided for review. MIP images are provided for review. Stenosis of the internal carotid arteries measured using NASCET criteria. Dose modulation, iterative reconstruction, and/or weight based adjustment of the mA/kV was utilized to reduce the radiation dose to as low as reasonably achievable. COMPARISON: CT brain earlier same day HISTORY: ORDERING SYSTEM PROVIDED HISTORY: stroke TECHNOLOGIST PROVIDED HISTORY: stroke Decision Support Exception - unselect if not a suspected or confirmed emergency medical condition->Emergency Medical Condition (MA) FINDINGS: CTA NECK: AORTIC ARCH/ARCH VESSELS: No dissection or arterial injury. No significant stenosis of the brachiocephalic or subclavian arteries. CAROTID ARTERIES: No dissection, arterial injury, or hemodynamically significant stenosis by NASCET criteria. VERTEBRAL ARTERIES: No dissection, arterial injury, or significant stenosis. SOFT TISSUES: There are multiple foci of soft tissue gas within the lower left neck, likely related to prior central venous line attempt. A right chest Port-A-Cath is present. The lung apices are clear. There is no pathologically enlarged lymphadenopathy or soft tissue mass identified. BONES: No lytic or blastic osseous lesions are identified.   There is a posterior disc osteophyte complex at C5-6 resulting in mild spinal canal stenosis and mild-to-moderate bilateral neural foraminal narrowing. 3D surface reformations were performed and concur with the above findings. CTA HEAD: ANTERIOR CIRCULATION: The intracranial segments of the internal carotid arteries are normal.  There is no significant stenosis or aneurysm identified. The anterior and middle cerebral arteries are normal in appearance. No significant stenosis or aneurysm is identified. POSTERIOR CIRCULATION: The distal vertebral arteries are normal in appearance. The basilar artery is normal.  No significant stenosis or aneurysm is identified. The posterior cerebral arteries are normal in appearance. OTHER: No dural venous sinus thrombosis on this non-dedicated study. BRAIN: There is no mass effect or midline shift. There is no vascular malformation identified. 3D surface reformations were performed and concur with the above findings. 1. No large vessel occlusion, significant stenosis or cerebral aneurysm identified. 2. No significant arterial stenosis identified within the neck. 3. Several foci of gas lucency within the lower left neck. Correlation with any history of attempted left central venous catheter placement is recommended. MRI LIMITED BRAIN    Result Date: 6/18/2021  EXAMINATION: MRI OF THE BRAIN WITHOUT CONTRAST  6/18/2021 11:03 am TECHNIQUE: Multiplanar multisequence MRI of the brain was performed without the administration of intravenous contrast. COMPARISON: October 23, 2020 HISTORY: ORDERING SYSTEM PROVIDED HISTORY: acute right sided weakness, seizures, prior hx of PRES TECHNOLOGIST PROVIDED HISTORY: acute right sided weakness, seizures, prior hx of PRES Reason for Exam: cva, rt sided weakness FINDINGS: INTRACRANIAL STRUCTURES/VENTRICLES: There is no restricted diffusion to suggest acute infarct. No mass effect or midline shift. No evidence of an acute intracranial hemorrhage. The ventricles and sulci are normal in size and configuration. Scattered white matter changes in the subcortical white matter periventricular white matter present. There is no evidence for blood products on gradient imaging. The sellar/suprasellar regions appear unremarkable. The normal signal voids within the major intracranial vessels appear maintained. ORBITS: The visualized portion of the orbits demonstrate no acute abnormality. SINUSES: The visualized paranasal sinuses and mastoid air cells are well aerated. BONES/SOFT TISSUES: The bone marrow signal intensity appears normal. The soft tissues demonstrate no acute abnormality. Mild chronic white matter changes mild volume loss. No evidence for restricted diffusion to suggest acute infarct       EKG      All EKG's are interpreted by the Emergency Department Physician who either signs or Co-signs this chart in the absence of a cardiologist.    EMERGENCY DEPARTMENT COURSE:  Patient came to emergency department, HPI and physical exam were conducted. All nursing notes were reviewed. Imaging is negative, admitted patient to the neuro ICU for further assessment and management. PROCEDURES:  PROCEDURE NOTE - EMERGENCY TUBE EXCHANGE    PATIENT NAME: [facility]  MEDICAL RECORD NO. [id number]  DATE: 6/18/2021  ATTENDING PHYSICIAN: Dr. All Hillman DIAGNOSIS:  Status epilepticus  POSTOPERATIVE DIAGNOSIS:  Same  PROCEDURE PERFORMED: Endotracheal tube exchange  PERFORMING PHYSICIAN: Yashira Noguera MD    MEDICATIONS: Propofol bolus    DISCUSSION:  Harjinder Reed is a 60y.o.-year-old female who requires continued intubation and ventilatory support due to status epilepticus. The history and physical examination were reviewed and confirmed. CONSENT: Unable to be obtained due to patient's condition. PROCEDURE:  A timeout was initiated by the bedside nurse and was confirmed by those present.   The patient was placed in table position, the catheter was inserted, ET tube cuff was deflated, ETT with was exchanged with an 8.0 ET tube, 24 cm at the lip. The patient tolerated the procedure well. COMPLICATIONS:  None     Karen Nguyen MD  2:46 PM, 6/18/21      CONSULTS:  IP CONSULT TO NEUROCRITICAL CARE  IP CONSULT TO DIETITIAN  IP CONSULT TO HEM/ONC    CRITICAL CARE:      FINAL IMPRESSION      1. Status epilepticus ([facility] [address]          DISPOSITION / PLAN     DISPOSITION Admitted 06/18/2021 11:59:07 AM      PATIENT REFERRED TO:  No follow-up provider specified.     DISCHARGE MEDICATIONS:  New Prescriptions    No medications on file       Karen Nguyen MD  Emergency Medicine Resident    (Please note that portions of thisnote were completed with a voice recognition program.  Efforts were made to edit the dictations but occasionally words are mis-transcribed.)        Karen Nguyen MD  Resident  06/18/21 4316 Decreased Nutrient Needs (Na, fluids)